# Patient Record
Sex: FEMALE | Race: WHITE | NOT HISPANIC OR LATINO | Employment: OTHER | URBAN - METROPOLITAN AREA
[De-identification: names, ages, dates, MRNs, and addresses within clinical notes are randomized per-mention and may not be internally consistent; named-entity substitution may affect disease eponyms.]

---

## 2018-06-14 ENCOUNTER — RECORDS - HEALTHEAST (OUTPATIENT)
Dept: LAB | Facility: CLINIC | Age: 73
End: 2018-06-14

## 2018-06-14 LAB
ALBUMIN SERPL-MCNC: 4.1 G/DL (ref 3.5–5)
ALP SERPL-CCNC: 82 U/L (ref 45–120)
ALT SERPL W P-5'-P-CCNC: 25 U/L (ref 0–45)
ANION GAP SERPL CALCULATED.3IONS-SCNC: 10 MMOL/L (ref 5–18)
AST SERPL W P-5'-P-CCNC: 19 U/L (ref 0–40)
BILIRUB SERPL-MCNC: 0.5 MG/DL (ref 0–1)
BUN SERPL-MCNC: 20 MG/DL (ref 8–28)
CALCIUM SERPL-MCNC: 9.6 MG/DL (ref 8.5–10.5)
CHLORIDE BLD-SCNC: 101 MMOL/L (ref 98–107)
CHOLEST SERPL-MCNC: 182 MG/DL
CO2 SERPL-SCNC: 29 MMOL/L (ref 22–31)
CREAT SERPL-MCNC: 0.83 MG/DL (ref 0.6–1.1)
FASTING STATUS PATIENT QL REPORTED: NO
GFR SERPL CREATININE-BSD FRML MDRD: >60 ML/MIN/1.73M2
GLUCOSE BLD-MCNC: 91 MG/DL (ref 70–125)
HDLC SERPL-MCNC: 65 MG/DL
LDLC SERPL CALC-MCNC: 100 MG/DL
POTASSIUM BLD-SCNC: 4 MMOL/L (ref 3.5–5)
PROT SERPL-MCNC: 6.5 G/DL (ref 6–8)
SODIUM SERPL-SCNC: 140 MMOL/L (ref 136–145)
TRIGL SERPL-MCNC: 83 MG/DL

## 2018-06-15 LAB — BACTERIA SPEC CULT: NO GROWTH

## 2019-05-02 ENCOUNTER — RECORDS - HEALTHEAST (OUTPATIENT)
Dept: LAB | Facility: CLINIC | Age: 74
End: 2019-05-02

## 2019-05-02 LAB
ALBUMIN SERPL-MCNC: 3.9 G/DL (ref 3.5–5)
ALP SERPL-CCNC: 88 U/L (ref 45–120)
ALT SERPL W P-5'-P-CCNC: 26 U/L (ref 0–45)
ANION GAP SERPL CALCULATED.3IONS-SCNC: 12 MMOL/L (ref 5–18)
AST SERPL W P-5'-P-CCNC: 22 U/L (ref 0–40)
BILIRUB SERPL-MCNC: 0.4 MG/DL (ref 0–1)
BUN SERPL-MCNC: 25 MG/DL (ref 8–28)
CALCIUM SERPL-MCNC: 9.6 MG/DL (ref 8.5–10.5)
CHLORIDE BLD-SCNC: 105 MMOL/L (ref 98–107)
CHOLEST SERPL-MCNC: 167 MG/DL
CO2 SERPL-SCNC: 24 MMOL/L (ref 22–31)
CREAT SERPL-MCNC: 0.77 MG/DL (ref 0.6–1.1)
FASTING STATUS PATIENT QL REPORTED: NORMAL
GFR SERPL CREATININE-BSD FRML MDRD: >60 ML/MIN/1.73M2
GLUCOSE BLD-MCNC: 88 MG/DL (ref 70–125)
HDLC SERPL-MCNC: 70 MG/DL
LDLC SERPL CALC-MCNC: 77 MG/DL
POTASSIUM BLD-SCNC: 4 MMOL/L (ref 3.5–5)
PROT SERPL-MCNC: 6.6 G/DL (ref 6–8)
SODIUM SERPL-SCNC: 141 MMOL/L (ref 136–145)
TRIGL SERPL-MCNC: 99 MG/DL

## 2020-09-21 ENCOUNTER — RECORDS - HEALTHEAST (OUTPATIENT)
Dept: LAB | Facility: CLINIC | Age: 75
End: 2020-09-21

## 2020-09-22 ENCOUNTER — RECORDS - HEALTHEAST (OUTPATIENT)
Dept: ADMINISTRATIVE | Facility: OTHER | Age: 75
End: 2020-09-22

## 2020-09-22 ENCOUNTER — RECORDS - HEALTHEAST (OUTPATIENT)
Dept: SCHEDULING | Facility: CLINIC | Age: 75
End: 2020-09-22

## 2020-09-22 DIAGNOSIS — Z78.0 POST-MENOPAUSAL: ICD-10-CM

## 2020-09-22 DIAGNOSIS — Z12.31 VISIT FOR SCREENING MAMMOGRAM: ICD-10-CM

## 2020-09-22 DIAGNOSIS — Z13.820 SCREENING FOR OSTEOPOROSIS: ICD-10-CM

## 2020-09-26 LAB
ALBUMIN SERPL-MCNC: 3.9 G/DL (ref 3.5–5)
ALP SERPL-CCNC: 83 U/L (ref 45–120)
ALT SERPL W P-5'-P-CCNC: 22 U/L (ref 0–45)
ANION GAP SERPL CALCULATED.3IONS-SCNC: 8 MMOL/L (ref 5–18)
AST SERPL W P-5'-P-CCNC: 16 U/L (ref 0–40)
BILIRUB SERPL-MCNC: 0.4 MG/DL (ref 0–1)
BUN SERPL-MCNC: 24 MG/DL (ref 8–28)
CALCIUM SERPL-MCNC: 9.4 MG/DL (ref 8.5–10.5)
CHLORIDE BLD-SCNC: 103 MMOL/L (ref 98–107)
CHOLEST SERPL-MCNC: 178 MG/DL
CO2 SERPL-SCNC: 30 MMOL/L (ref 22–31)
CREAT SERPL-MCNC: 0.79 MG/DL (ref 0.6–1.1)
FASTING STATUS PATIENT QL REPORTED: NORMAL
GFR SERPL CREATININE-BSD FRML MDRD: >60 ML/MIN/1.73M2
GLUCOSE BLD-MCNC: 92 MG/DL (ref 70–125)
HDLC SERPL-MCNC: 71 MG/DL
LDLC SERPL CALC-MCNC: 89 MG/DL
POTASSIUM BLD-SCNC: 3.9 MMOL/L (ref 3.5–5)
PROT SERPL-MCNC: 7.2 G/DL (ref 6–8)
SODIUM SERPL-SCNC: 141 MMOL/L (ref 136–145)
TRIGL SERPL-MCNC: 91 MG/DL

## 2021-03-10 ENCOUNTER — TRANSCRIBE ORDERS (OUTPATIENT)
Dept: OTHER | Age: 76
End: 2021-03-10

## 2021-03-10 ENCOUNTER — AMBULATORY - HEALTHEAST (OUTPATIENT)
Dept: ADMINISTRATIVE | Facility: REHABILITATION | Age: 76
End: 2021-03-10

## 2021-03-10 DIAGNOSIS — I89.0 LYMPHEDEMA: ICD-10-CM

## 2021-03-10 DIAGNOSIS — I89.0 LYMPHEDEMA: Primary | ICD-10-CM

## 2021-03-15 ENCOUNTER — AMBULATORY - HEALTHEAST (OUTPATIENT)
Dept: NURSING | Facility: CLINIC | Age: 76
End: 2021-03-15

## 2021-04-05 ENCOUNTER — AMBULATORY - HEALTHEAST (OUTPATIENT)
Dept: NURSING | Facility: CLINIC | Age: 76
End: 2021-04-05

## 2021-04-14 ENCOUNTER — AMBULATORY - HEALTHEAST (OUTPATIENT)
Dept: VASCULAR SURGERY | Facility: CLINIC | Age: 76
End: 2021-04-14

## 2021-04-14 DIAGNOSIS — I89.0 LYMPHEDEMA: ICD-10-CM

## 2021-04-14 DIAGNOSIS — L97.909 VENOUS STASIS ULCER (H): ICD-10-CM

## 2021-04-14 DIAGNOSIS — I83.009 VENOUS STASIS ULCER (H): ICD-10-CM

## 2021-04-21 ENCOUNTER — OFFICE VISIT - HEALTHEAST (OUTPATIENT)
Dept: PHYSICAL THERAPY | Facility: REHABILITATION | Age: 76
End: 2021-04-21

## 2021-04-21 DIAGNOSIS — L97.811 VENOUS STASIS ULCER OF OTHER PART OF RIGHT LOWER LEG LIMITED TO BREAKDOWN OF SKIN WITH VARICOSE VEINS (H): ICD-10-CM

## 2021-04-21 DIAGNOSIS — I83.018 VENOUS STASIS ULCER OF OTHER PART OF RIGHT LOWER LEG LIMITED TO BREAKDOWN OF SKIN WITH VARICOSE VEINS (H): ICD-10-CM

## 2021-04-21 DIAGNOSIS — R22.43 LOCALIZED SWELLING OF BOTH LOWER LEGS: ICD-10-CM

## 2021-04-21 DIAGNOSIS — I89.0 LYMPHEDEMA: ICD-10-CM

## 2021-04-28 ENCOUNTER — OFFICE VISIT - HEALTHEAST (OUTPATIENT)
Dept: VASCULAR SURGERY | Facility: CLINIC | Age: 76
End: 2021-04-28

## 2021-04-28 ENCOUNTER — AMBULATORY - HEALTHEAST (OUTPATIENT)
Dept: VASCULAR SURGERY | Facility: CLINIC | Age: 76
End: 2021-04-28

## 2021-04-28 ENCOUNTER — COMMUNICATION - HEALTHEAST (OUTPATIENT)
Dept: SCHEDULING | Facility: CLINIC | Age: 76
End: 2021-04-28

## 2021-04-28 DIAGNOSIS — L97.911 SKIN ULCER OF RIGHT LOWER LEG, LIMITED TO BREAKDOWN OF SKIN (H): ICD-10-CM

## 2021-04-28 DIAGNOSIS — R60.9 LIPEDEMA: ICD-10-CM

## 2021-04-28 DIAGNOSIS — I89.0 ACQUIRED LYMPHEDEMA OF LEG: ICD-10-CM

## 2021-04-28 DIAGNOSIS — L97.121: ICD-10-CM

## 2021-04-28 DIAGNOSIS — E55.9 VITAMIN D DEFICIENCY: ICD-10-CM

## 2021-04-28 DIAGNOSIS — I87.2 VENOUS INSUFFICIENCY OF BOTH LOWER EXTREMITIES: ICD-10-CM

## 2021-04-28 RX ORDER — SPIRONOLACTONE 25 MG/1
25 TABLET ORAL EVERY MORNING
Status: SHIPPED | COMMUNITY
Start: 2021-04-28

## 2021-04-28 ASSESSMENT — MIFFLIN-ST. JEOR: SCORE: 1585.44

## 2021-04-30 ENCOUNTER — OFFICE VISIT - HEALTHEAST (OUTPATIENT)
Dept: VASCULAR SURGERY | Facility: CLINIC | Age: 76
End: 2021-04-30

## 2021-04-30 ENCOUNTER — COMMUNICATION - HEALTHEAST (OUTPATIENT)
Dept: SCHEDULING | Facility: CLINIC | Age: 76
End: 2021-04-30

## 2021-04-30 DIAGNOSIS — T14.8XXA PAIN ASSOCIATED WITH WOUND: ICD-10-CM

## 2021-04-30 DIAGNOSIS — R52 PAIN ASSOCIATED WITH WOUND: ICD-10-CM

## 2021-04-30 DIAGNOSIS — L97.911 SKIN ULCER OF RIGHT LOWER LEG, LIMITED TO BREAKDOWN OF SKIN (H): ICD-10-CM

## 2021-04-30 DIAGNOSIS — I87.2 VENOUS INSUFFICIENCY OF BOTH LOWER EXTREMITIES: ICD-10-CM

## 2021-04-30 DIAGNOSIS — I89.0 ACQUIRED LYMPHEDEMA OF LEG: ICD-10-CM

## 2021-04-30 DIAGNOSIS — L03.818 CELLULITIS OF OTHER SPECIFIED SITE: ICD-10-CM

## 2021-05-02 LAB
BACTERIA SPEC CULT: ABNORMAL
BACTERIA SPEC CULT: ABNORMAL
GRAM STAIN RESULT: ABNORMAL
GRAM STAIN RESULT: ABNORMAL

## 2021-05-05 ENCOUNTER — OFFICE VISIT - HEALTHEAST (OUTPATIENT)
Dept: FAMILY MEDICINE | Facility: CLINIC | Age: 76
End: 2021-05-05

## 2021-05-05 ENCOUNTER — COMMUNICATION - HEALTHEAST (OUTPATIENT)
Dept: PHYSICAL THERAPY | Facility: REHABILITATION | Age: 76
End: 2021-05-05

## 2021-05-05 ENCOUNTER — OFFICE VISIT - HEALTHEAST (OUTPATIENT)
Dept: VASCULAR SURGERY | Facility: CLINIC | Age: 76
End: 2021-05-05

## 2021-05-05 ENCOUNTER — COMMUNICATION - HEALTHEAST (OUTPATIENT)
Dept: VASCULAR SURGERY | Facility: CLINIC | Age: 76
End: 2021-05-05

## 2021-05-05 ENCOUNTER — OFFICE VISIT - HEALTHEAST (OUTPATIENT)
Dept: PHYSICAL THERAPY | Facility: REHABILITATION | Age: 76
End: 2021-05-05

## 2021-05-05 DIAGNOSIS — L97.911 SKIN ULCER OF RIGHT LOWER LEG, LIMITED TO BREAKDOWN OF SKIN (H): ICD-10-CM

## 2021-05-05 DIAGNOSIS — I89.0 ACQUIRED LYMPHEDEMA OF LEG: ICD-10-CM

## 2021-05-05 DIAGNOSIS — I87.2 VENOUS INSUFFICIENCY OF BOTH LOWER EXTREMITIES: ICD-10-CM

## 2021-05-05 DIAGNOSIS — R22.43 LOCALIZED SWELLING OF BOTH LOWER LEGS: ICD-10-CM

## 2021-05-05 DIAGNOSIS — I89.0 LYMPHEDEMA: ICD-10-CM

## 2021-05-05 DIAGNOSIS — L08.9 SOFT TISSUE INFECTION: ICD-10-CM

## 2021-05-05 DIAGNOSIS — I83.018 VENOUS STASIS ULCER OF OTHER PART OF RIGHT LOWER LEG LIMITED TO BREAKDOWN OF SKIN WITH VARICOSE VEINS (H): ICD-10-CM

## 2021-05-05 DIAGNOSIS — L97.811 VENOUS STASIS ULCER OF OTHER PART OF RIGHT LOWER LEG LIMITED TO BREAKDOWN OF SKIN WITH VARICOSE VEINS (H): ICD-10-CM

## 2021-05-07 ENCOUNTER — AMBULATORY - HEALTHEAST (OUTPATIENT)
Dept: VASCULAR SURGERY | Facility: CLINIC | Age: 76
End: 2021-05-07

## 2021-05-10 ENCOUNTER — OFFICE VISIT - HEALTHEAST (OUTPATIENT)
Dept: PHYSICAL THERAPY | Facility: REHABILITATION | Age: 76
End: 2021-05-10

## 2021-05-10 ENCOUNTER — AMBULATORY - HEALTHEAST (OUTPATIENT)
Dept: VASCULAR SURGERY | Facility: CLINIC | Age: 76
End: 2021-05-10

## 2021-05-10 DIAGNOSIS — L97.811 VENOUS STASIS ULCER OF OTHER PART OF RIGHT LOWER LEG LIMITED TO BREAKDOWN OF SKIN WITH VARICOSE VEINS (H): ICD-10-CM

## 2021-05-10 DIAGNOSIS — I89.0 LYMPHEDEMA: ICD-10-CM

## 2021-05-10 DIAGNOSIS — I83.018 VENOUS STASIS ULCER OF OTHER PART OF RIGHT LOWER LEG LIMITED TO BREAKDOWN OF SKIN WITH VARICOSE VEINS (H): ICD-10-CM

## 2021-05-10 DIAGNOSIS — R22.43 LOCALIZED SWELLING OF BOTH LOWER LEGS: ICD-10-CM

## 2021-05-10 DIAGNOSIS — L97.911 SKIN ULCER OF RIGHT LOWER LEG, LIMITED TO BREAKDOWN OF SKIN (H): ICD-10-CM

## 2021-05-13 ENCOUNTER — OFFICE VISIT - HEALTHEAST (OUTPATIENT)
Dept: VASCULAR SURGERY | Facility: CLINIC | Age: 76
End: 2021-05-13

## 2021-05-13 DIAGNOSIS — L97.911 SKIN ULCER OF RIGHT LOWER LEG, LIMITED TO BREAKDOWN OF SKIN (H): ICD-10-CM

## 2021-05-13 DIAGNOSIS — R60.9 LIPEDEMA: ICD-10-CM

## 2021-05-13 DIAGNOSIS — I89.0 ACQUIRED LYMPHEDEMA OF LEG: ICD-10-CM

## 2021-05-17 ENCOUNTER — AMBULATORY - HEALTHEAST (OUTPATIENT)
Dept: VASCULAR SURGERY | Facility: CLINIC | Age: 76
End: 2021-05-17

## 2021-05-17 ENCOUNTER — OFFICE VISIT - HEALTHEAST (OUTPATIENT)
Dept: PHYSICAL THERAPY | Facility: REHABILITATION | Age: 76
End: 2021-05-17

## 2021-05-17 DIAGNOSIS — L97.811 VENOUS STASIS ULCER OF OTHER PART OF RIGHT LOWER LEG LIMITED TO BREAKDOWN OF SKIN WITH VARICOSE VEINS (H): ICD-10-CM

## 2021-05-17 DIAGNOSIS — I83.018 VENOUS STASIS ULCER OF OTHER PART OF RIGHT LOWER LEG LIMITED TO BREAKDOWN OF SKIN WITH VARICOSE VEINS (H): ICD-10-CM

## 2021-05-17 DIAGNOSIS — R22.43 LOCALIZED SWELLING OF BOTH LOWER LEGS: ICD-10-CM

## 2021-05-17 DIAGNOSIS — I89.0 LYMPHEDEMA: ICD-10-CM

## 2021-05-17 DIAGNOSIS — L97.911 SKIN ULCER OF RIGHT LOWER LEG, LIMITED TO BREAKDOWN OF SKIN (H): ICD-10-CM

## 2021-05-19 ENCOUNTER — AMBULATORY - HEALTHEAST (OUTPATIENT)
Dept: VASCULAR SURGERY | Facility: CLINIC | Age: 76
End: 2021-05-19

## 2021-05-19 ENCOUNTER — COMMUNICATION - HEALTHEAST (OUTPATIENT)
Dept: VASCULAR SURGERY | Facility: CLINIC | Age: 76
End: 2021-05-19

## 2021-05-19 ENCOUNTER — OFFICE VISIT - HEALTHEAST (OUTPATIENT)
Dept: PHYSICAL THERAPY | Facility: REHABILITATION | Age: 76
End: 2021-05-19

## 2021-05-19 DIAGNOSIS — L97.911 SKIN ULCER OF RIGHT LOWER LEG, LIMITED TO BREAKDOWN OF SKIN (H): ICD-10-CM

## 2021-05-19 DIAGNOSIS — R22.43 LOCALIZED SWELLING OF BOTH LOWER LEGS: ICD-10-CM

## 2021-05-19 DIAGNOSIS — L08.9 SOFT TISSUE INFECTION: ICD-10-CM

## 2021-05-19 DIAGNOSIS — I89.0 LYMPHEDEMA: ICD-10-CM

## 2021-05-19 DIAGNOSIS — I89.0 ACQUIRED LYMPHEDEMA OF LEG: ICD-10-CM

## 2021-05-19 DIAGNOSIS — L97.811 VENOUS STASIS ULCER OF OTHER PART OF RIGHT LOWER LEG LIMITED TO BREAKDOWN OF SKIN WITH VARICOSE VEINS (H): ICD-10-CM

## 2021-05-19 DIAGNOSIS — I83.018 VENOUS STASIS ULCER OF OTHER PART OF RIGHT LOWER LEG LIMITED TO BREAKDOWN OF SKIN WITH VARICOSE VEINS (H): ICD-10-CM

## 2021-05-25 ENCOUNTER — OFFICE VISIT - HEALTHEAST (OUTPATIENT)
Dept: PHYSICAL THERAPY | Facility: REHABILITATION | Age: 76
End: 2021-05-25

## 2021-05-25 ENCOUNTER — AMBULATORY - HEALTHEAST (OUTPATIENT)
Dept: VASCULAR SURGERY | Facility: CLINIC | Age: 76
End: 2021-05-25

## 2021-05-25 DIAGNOSIS — I87.2 VENOUS INSUFFICIENCY OF BOTH LOWER EXTREMITIES: ICD-10-CM

## 2021-05-25 DIAGNOSIS — I83.018 VENOUS STASIS ULCER OF OTHER PART OF RIGHT LOWER LEG LIMITED TO BREAKDOWN OF SKIN WITH VARICOSE VEINS (H): ICD-10-CM

## 2021-05-25 DIAGNOSIS — L08.9 SOFT TISSUE INFECTION: ICD-10-CM

## 2021-05-25 DIAGNOSIS — I89.0 LYMPHEDEMA: ICD-10-CM

## 2021-05-25 DIAGNOSIS — L97.911 SKIN ULCER OF RIGHT LOWER LEG, LIMITED TO BREAKDOWN OF SKIN (H): ICD-10-CM

## 2021-05-25 DIAGNOSIS — I89.0 ACQUIRED LYMPHEDEMA OF LEG: ICD-10-CM

## 2021-05-25 DIAGNOSIS — L97.811 VENOUS STASIS ULCER OF OTHER PART OF RIGHT LOWER LEG LIMITED TO BREAKDOWN OF SKIN WITH VARICOSE VEINS (H): ICD-10-CM

## 2021-05-25 DIAGNOSIS — R22.43 LOCALIZED SWELLING OF BOTH LOWER LEGS: ICD-10-CM

## 2021-05-26 ENCOUNTER — RECORDS - HEALTHEAST (OUTPATIENT)
Dept: ADMINISTRATIVE | Facility: CLINIC | Age: 76
End: 2021-05-26

## 2021-05-27 ENCOUNTER — RECORDS - HEALTHEAST (OUTPATIENT)
Dept: ADMINISTRATIVE | Facility: CLINIC | Age: 76
End: 2021-05-27

## 2021-05-27 ENCOUNTER — OFFICE VISIT - HEALTHEAST (OUTPATIENT)
Dept: VASCULAR SURGERY | Facility: CLINIC | Age: 76
End: 2021-05-27

## 2021-05-27 VITALS
RESPIRATION RATE: 18 BRPM | TEMPERATURE: 97.8 F | HEART RATE: 78 BPM | DIASTOLIC BLOOD PRESSURE: 78 MMHG | SYSTOLIC BLOOD PRESSURE: 128 MMHG

## 2021-05-27 VITALS
TEMPERATURE: 98.6 F | OXYGEN SATURATION: 99 % | RESPIRATION RATE: 19 BRPM | HEART RATE: 81 BPM | DIASTOLIC BLOOD PRESSURE: 79 MMHG | SYSTOLIC BLOOD PRESSURE: 135 MMHG

## 2021-05-27 VITALS
RESPIRATION RATE: 18 BRPM | TEMPERATURE: 97.8 F | DIASTOLIC BLOOD PRESSURE: 78 MMHG | HEART RATE: 80 BPM | SYSTOLIC BLOOD PRESSURE: 124 MMHG

## 2021-05-27 VITALS — SYSTOLIC BLOOD PRESSURE: 148 MMHG | DIASTOLIC BLOOD PRESSURE: 76 MMHG | HEART RATE: 84 BPM | TEMPERATURE: 98.9 F

## 2021-05-27 VITALS
DIASTOLIC BLOOD PRESSURE: 64 MMHG | SYSTOLIC BLOOD PRESSURE: 112 MMHG | HEART RATE: 81 BPM | TEMPERATURE: 98.3 F | OXYGEN SATURATION: 99 %

## 2021-05-27 VITALS
DIASTOLIC BLOOD PRESSURE: 70 MMHG | RESPIRATION RATE: 17 BRPM | TEMPERATURE: 97.7 F | SYSTOLIC BLOOD PRESSURE: 128 MMHG | HEART RATE: 78 BPM

## 2021-05-27 VITALS — TEMPERATURE: 97.3 F | DIASTOLIC BLOOD PRESSURE: 66 MMHG | RESPIRATION RATE: 18 BRPM | SYSTOLIC BLOOD PRESSURE: 130 MMHG

## 2021-05-27 VITALS
SYSTOLIC BLOOD PRESSURE: 120 MMHG | DIASTOLIC BLOOD PRESSURE: 70 MMHG | HEART RATE: 78 BPM | RESPIRATION RATE: 18 BRPM | TEMPERATURE: 98 F

## 2021-05-27 DIAGNOSIS — I87.2 VENOUS INSUFFICIENCY OF BOTH LOWER EXTREMITIES: ICD-10-CM

## 2021-05-27 DIAGNOSIS — I89.0 LYMPHEDEMA OF BOTH LOWER EXTREMITIES: ICD-10-CM

## 2021-05-27 DIAGNOSIS — R60.9 LIPEDEMA: ICD-10-CM

## 2021-05-27 DIAGNOSIS — E66.01 MORBID OBESITY WITH BMI OF 50.0-59.9, ADULT (H): ICD-10-CM

## 2021-05-27 DIAGNOSIS — L97.911 SKIN ULCER OF RIGHT LOWER LEG, LIMITED TO BREAKDOWN OF SKIN (H): ICD-10-CM

## 2021-05-27 DIAGNOSIS — I89.0 ACQUIRED LYMPHEDEMA OF LEG: ICD-10-CM

## 2021-06-01 ENCOUNTER — AMBULATORY - HEALTHEAST (OUTPATIENT)
Dept: VASCULAR SURGERY | Facility: CLINIC | Age: 76
End: 2021-06-01

## 2021-06-01 ENCOUNTER — RECORDS - HEALTHEAST (OUTPATIENT)
Dept: ADMINISTRATIVE | Facility: CLINIC | Age: 76
End: 2021-06-01

## 2021-06-01 DIAGNOSIS — I87.2 VENOUS INSUFFICIENCY OF BOTH LOWER EXTREMITIES: ICD-10-CM

## 2021-06-01 DIAGNOSIS — I89.0 ACQUIRED LYMPHEDEMA OF LEG: ICD-10-CM

## 2021-06-01 DIAGNOSIS — L97.911 SKIN ULCER OF RIGHT LOWER LEG, LIMITED TO BREAKDOWN OF SKIN (H): ICD-10-CM

## 2021-06-02 ENCOUNTER — OFFICE VISIT - HEALTHEAST (OUTPATIENT)
Dept: PHYSICAL THERAPY | Facility: REHABILITATION | Age: 76
End: 2021-06-02

## 2021-06-02 DIAGNOSIS — I89.0 LYMPHEDEMA: ICD-10-CM

## 2021-06-02 DIAGNOSIS — L97.811 VENOUS STASIS ULCER OF OTHER PART OF RIGHT LOWER LEG LIMITED TO BREAKDOWN OF SKIN WITH VARICOSE VEINS (H): ICD-10-CM

## 2021-06-02 DIAGNOSIS — R22.43 LOCALIZED SWELLING OF BOTH LOWER LEGS: ICD-10-CM

## 2021-06-02 DIAGNOSIS — I83.018 VENOUS STASIS ULCER OF OTHER PART OF RIGHT LOWER LEG LIMITED TO BREAKDOWN OF SKIN WITH VARICOSE VEINS (H): ICD-10-CM

## 2021-06-04 ENCOUNTER — AMBULATORY - HEALTHEAST (OUTPATIENT)
Dept: VASCULAR SURGERY | Facility: CLINIC | Age: 76
End: 2021-06-04

## 2021-06-04 ENCOUNTER — OFFICE VISIT - HEALTHEAST (OUTPATIENT)
Dept: PHYSICAL THERAPY | Facility: REHABILITATION | Age: 76
End: 2021-06-04

## 2021-06-04 DIAGNOSIS — I83.018 VENOUS STASIS ULCER OF OTHER PART OF RIGHT LOWER LEG LIMITED TO BREAKDOWN OF SKIN WITH VARICOSE VEINS (H): ICD-10-CM

## 2021-06-04 DIAGNOSIS — R22.43 LOCALIZED SWELLING OF BOTH LOWER LEGS: ICD-10-CM

## 2021-06-04 DIAGNOSIS — L97.811 VENOUS STASIS ULCER OF OTHER PART OF RIGHT LOWER LEG LIMITED TO BREAKDOWN OF SKIN WITH VARICOSE VEINS (H): ICD-10-CM

## 2021-06-04 DIAGNOSIS — I89.0 LYMPHEDEMA: ICD-10-CM

## 2021-06-05 VITALS
SYSTOLIC BLOOD PRESSURE: 122 MMHG | RESPIRATION RATE: 18 BRPM | BODY MASS INDEX: 47.58 KG/M2 | TEMPERATURE: 98.2 F | HEART RATE: 78 BPM | DIASTOLIC BLOOD PRESSURE: 60 MMHG | WEIGHT: 252 LBS | HEIGHT: 61 IN

## 2021-06-07 ENCOUNTER — AMBULATORY - HEALTHEAST (OUTPATIENT)
Dept: VASCULAR SURGERY | Facility: CLINIC | Age: 76
End: 2021-06-07

## 2021-06-07 ENCOUNTER — OFFICE VISIT - HEALTHEAST (OUTPATIENT)
Dept: PHYSICAL THERAPY | Facility: REHABILITATION | Age: 76
End: 2021-06-07

## 2021-06-07 DIAGNOSIS — I89.0 LYMPHEDEMA: ICD-10-CM

## 2021-06-07 DIAGNOSIS — L97.911 SKIN ULCER OF RIGHT LOWER LEG, LIMITED TO BREAKDOWN OF SKIN (H): ICD-10-CM

## 2021-06-07 DIAGNOSIS — R22.43 LOCALIZED SWELLING OF BOTH LOWER LEGS: ICD-10-CM

## 2021-06-07 DIAGNOSIS — I83.018 VENOUS STASIS ULCER OF OTHER PART OF RIGHT LOWER LEG LIMITED TO BREAKDOWN OF SKIN WITH VARICOSE VEINS (H): ICD-10-CM

## 2021-06-07 DIAGNOSIS — L97.811 VENOUS STASIS ULCER OF OTHER PART OF RIGHT LOWER LEG LIMITED TO BREAKDOWN OF SKIN WITH VARICOSE VEINS (H): ICD-10-CM

## 2021-06-07 DIAGNOSIS — I89.0 ACQUIRED LYMPHEDEMA OF LEG: ICD-10-CM

## 2021-06-09 ENCOUNTER — COMMUNICATION - HEALTHEAST (OUTPATIENT)
Dept: SCHEDULING | Facility: CLINIC | Age: 76
End: 2021-06-09

## 2021-06-09 ENCOUNTER — OFFICE VISIT - HEALTHEAST (OUTPATIENT)
Dept: PHYSICAL THERAPY | Facility: REHABILITATION | Age: 76
End: 2021-06-09

## 2021-06-09 DIAGNOSIS — R22.43 LOCALIZED SWELLING OF BOTH LOWER LEGS: ICD-10-CM

## 2021-06-09 DIAGNOSIS — I89.0 LYMPHEDEMA: ICD-10-CM

## 2021-06-09 DIAGNOSIS — L97.811 VENOUS STASIS ULCER OF OTHER PART OF RIGHT LOWER LEG LIMITED TO BREAKDOWN OF SKIN WITH VARICOSE VEINS (H): ICD-10-CM

## 2021-06-09 DIAGNOSIS — I83.018 VENOUS STASIS ULCER OF OTHER PART OF RIGHT LOWER LEG LIMITED TO BREAKDOWN OF SKIN WITH VARICOSE VEINS (H): ICD-10-CM

## 2021-06-10 ENCOUNTER — OFFICE VISIT - HEALTHEAST (OUTPATIENT)
Dept: VASCULAR SURGERY | Facility: CLINIC | Age: 76
End: 2021-06-10

## 2021-06-10 DIAGNOSIS — I87.2 VENOUS INSUFFICIENCY OF BOTH LOWER EXTREMITIES: ICD-10-CM

## 2021-06-10 DIAGNOSIS — L97.911 SKIN ULCER OF RIGHT LOWER LEG, LIMITED TO BREAKDOWN OF SKIN (H): ICD-10-CM

## 2021-06-10 DIAGNOSIS — E66.01 MORBID OBESITY WITH BMI OF 50.0-59.9, ADULT (H): ICD-10-CM

## 2021-06-10 DIAGNOSIS — I89.0 ACQUIRED LYMPHEDEMA OF LEG: ICD-10-CM

## 2021-06-14 ENCOUNTER — AMBULATORY - HEALTHEAST (OUTPATIENT)
Dept: VASCULAR SURGERY | Facility: CLINIC | Age: 76
End: 2021-06-14

## 2021-06-14 ENCOUNTER — OFFICE VISIT - HEALTHEAST (OUTPATIENT)
Dept: PHYSICAL THERAPY | Facility: REHABILITATION | Age: 76
End: 2021-06-14

## 2021-06-14 DIAGNOSIS — L97.811 VENOUS STASIS ULCER OF OTHER PART OF RIGHT LOWER LEG LIMITED TO BREAKDOWN OF SKIN WITH VARICOSE VEINS (H): ICD-10-CM

## 2021-06-14 DIAGNOSIS — R22.43 LOCALIZED SWELLING OF BOTH LOWER LEGS: ICD-10-CM

## 2021-06-14 DIAGNOSIS — I83.018 VENOUS STASIS ULCER OF OTHER PART OF RIGHT LOWER LEG LIMITED TO BREAKDOWN OF SKIN WITH VARICOSE VEINS (H): ICD-10-CM

## 2021-06-14 DIAGNOSIS — I89.0 LYMPHEDEMA: ICD-10-CM

## 2021-06-14 DIAGNOSIS — L97.911 SKIN ULCER OF RIGHT LOWER LEG, LIMITED TO BREAKDOWN OF SKIN (H): ICD-10-CM

## 2021-06-15 ENCOUNTER — RECORDS - HEALTHEAST (OUTPATIENT)
Dept: ADMINISTRATIVE | Facility: OTHER | Age: 76
End: 2021-06-15

## 2021-06-16 ENCOUNTER — OFFICE VISIT - HEALTHEAST (OUTPATIENT)
Dept: PHYSICAL THERAPY | Facility: REHABILITATION | Age: 76
End: 2021-06-16

## 2021-06-16 ENCOUNTER — AMBULATORY - HEALTHEAST (OUTPATIENT)
Dept: VASCULAR SURGERY | Facility: CLINIC | Age: 76
End: 2021-06-16

## 2021-06-16 DIAGNOSIS — I87.2 VENOUS INSUFFICIENCY OF BOTH LOWER EXTREMITIES: ICD-10-CM

## 2021-06-16 DIAGNOSIS — I83.018 VENOUS STASIS ULCER OF OTHER PART OF RIGHT LOWER LEG LIMITED TO BREAKDOWN OF SKIN WITH VARICOSE VEINS (H): ICD-10-CM

## 2021-06-16 DIAGNOSIS — R22.43 LOCALIZED SWELLING OF BOTH LOWER LEGS: ICD-10-CM

## 2021-06-16 DIAGNOSIS — L97.911 SKIN ULCER OF RIGHT LOWER LEG, LIMITED TO BREAKDOWN OF SKIN (H): ICD-10-CM

## 2021-06-16 DIAGNOSIS — I89.0 LYMPHEDEMA: ICD-10-CM

## 2021-06-16 DIAGNOSIS — L97.811 VENOUS STASIS ULCER OF OTHER PART OF RIGHT LOWER LEG LIMITED TO BREAKDOWN OF SKIN WITH VARICOSE VEINS (H): ICD-10-CM

## 2021-06-16 DIAGNOSIS — I89.0 ACQUIRED LYMPHEDEMA OF LEG: ICD-10-CM

## 2021-06-16 PROBLEM — E55.9 VITAMIN D DEFICIENCY: Status: ACTIVE | Noted: 2021-04-28

## 2021-06-16 PROBLEM — I10 ESSENTIAL HYPERTENSION: Status: ACTIVE | Noted: 2021-04-28

## 2021-06-16 PROBLEM — K21.9 GERD (GASTROESOPHAGEAL REFLUX DISEASE): Status: ACTIVE | Noted: 2021-04-28

## 2021-06-16 PROBLEM — F32.A DEPRESSION: Status: ACTIVE | Noted: 2021-04-28

## 2021-06-16 NOTE — PROGRESS NOTES
April 22, 2021    Patient: Elizabeth Kelley   MR Number: [id number]   YOB: 1945   Date of Visit: 4/21/2021       Dear Dr. Demond Walters:    Thank you for this referral.   We are seeing Elizabeth Kelley for Physical Therapy of R LE lymphedema with venous stasis ulcer.    Medicare and/or Medicaid requires physician review and approval of the treatment plan. Please review the plan of care and verify that you agree with the therapy plan of care by co-signing this note.      Plan of Care  Authorization / Certification Start Date: 04/21/21  Authorization / Certification End Date: 07/20/21  Authorization / Certification Number of Visits: 12  Communication with: Referral Source  Patient Related Instruction: Nature of Condition;Treatment plan and rationale;Self Care instruction;Basis of treatment;Precautions;Next steps;Expected outcome  Times per Week: 2-3  Number of Weeks: 4-6  Number of Visits: 12  Discharge Planning: when goals are met or pt I with home program  Precautions / Restrictions :   Therapeutic Exercise: ROM;Stretching;Strengthening;Lymphedema  Neuromuscular Reeducation: core;TNE  Manual Therapy: soft tissue mobilization;myofascial release;joint mobilization;lymphatic drainage massage  Functional Training (ADL's): skin care;self care;lymphedema precautions;bandage/garment wear and care;meal prep;safety procedures;instructions for equipment;ADL's;compensatory training;instructions in transfers;ergonomics  Equipment: compression bandages      Goals  Patient will have a decreased volume in : right;LE;by 5%;to decrease risk of infection;for better fit of clothing;for improved body image;for ease of movement;in 12 weeks  Patient will have decreased fibrosis, scar tissue for improved lymphatic mobilitiy : in 12 weeks  Patient will perform, verbalize self-management of: skin care;self-monitoring;exercise;massage;self-compression;compression wear;compression care;infection prevention;in 12 weeks  Pt will:  fred decreased wound size by 50% for improvement of skin quality and decreased risk for infections in 12 weeks.          If you have any questions or concerns, please don't hesitate to call.    Sincerely,    Bee Olguin, PT, DPT, OCS, CLT      Physician recommendation:     ___ Follow therapist's recommendation        ___ Modify therapy      I certify the need for these services furnished within this plan and while under my care.    Referring Provider's  Printed Name:   _____________________________________________    Referring Provider's signature:  __________________________________________________  Date/time:    ________________        After signing this form, please return to the fax number in the header.  Thank you.    [facility]   Initial Evaluation    Patient Name: Elizabeth Kelley  Date of evaluation: 4/22/2021(evaluation performed 4/21/21)  Visit number: 1/12  Referring Provider: Demond Walters MD  Referring Diagnosis:   Lymphedema [I89.0]  - Primary       Venous stasis ulcer (H) [I83.009, L97.909]         Visit Diagnosis:     ICD-10-CM    1. Lymphedema  I89.0    2. Venous stasis ulcer of other part of right lower leg limited to breakdown of skin with varicose veins (H)  I83.018     L97.811    3. Localized swelling of both lower legs  R22.43        Assessment:        Elizabeth Kelley is a 75 y.o. female who presents to therapy today with chief complaints of R LE wound and swelling. Onset date of sx was 10/20/21 when pt nicked herself a couple of times while shaving. Pt reports those wounds ended up worsening and she tried to use cephalexin and prednisone (which had worked for wounds she had had on her left leg years ago) and those meds helped some but then she couldn't take them anymore and wounds continued. Pt started with a wound clinic in February for wound care and she recently just had a venous closure procedure on 4/12/21 on her R leg to help heal her wounds.  Pt reported h/o  significant weight loss >25 years ago with B medial thigh and lower abdominal skin removal - with lymph nodes accidentally removed at that time.  Pt has struggled with B LE swelling ever since. Pain symptoms are mild to severe B LE.  Functional impairments include difficulty and pain in R leg with decreased skin integrity, ROM and trunk/core strength.  Pt demo's signs and sx consistent with R LE lymphedema and venous swelling with significant R lower leg and B thigh wounds and scarring with fibrosis.     Pt. is appropriate for skilled PT intervention as outlined in the Plan of Care (POC).  Pt. is a good candidate for skilled PT services to improve pain levels and function.  Pt. would be a good candidate for edema management and to develop a home management program.    Goals:  Patient will have a decreased volume in : right;LE;by 5%;to decrease risk of infection;for better fit of clothing;for improved body image;for ease of movement;in 12 weeks  Patient will have decreased fibrosis, scar tissue for improved lymphatic mobilitiy : in 12 weeks  Patient will perform, verbalize self-management of: skin care;self-monitoring;exercise;massage;self-compression;compression wear;compression care;infection prevention;in 12 weeks  Pt will: demo decreased wound size by 50% for improvement of skin quality and decreased risk for infections in 12 weeks.      Patient's expectations/goals are realistic.    Barriers to Learning or Achieving Goals:  Chronicity of the problem.  Co-morbidities or other medical factors.  venous wound long term       Plan / Patient Instructions:      Plan for next visit:  Pt having a consult with [facility] wound care clinic NP next week. Unsure if there may be still some infection in R leg contributing to her high levels of pain and sensitivity to touch on R LE.  Pt to discuss with NP possibility of change to wound dressings or wound cultures for improved treatment and healing rate of wound.  Pt to  continue with self-wound dressing changes as needed and compression bandaging over top until able to return to PT for POC.  Assess response to re-starting lymph HEP as she did 5 years ago - to try sitting, supine or standing as tolerated.    Plan of Care:   Authorization / Certification Start Date: 04/21/21  Authorization / Certification End Date: 07/20/21  Authorization / Certification Number of Visits: 12  Communication with: Referral Source  Patient Related Instruction: Nature of Condition;Treatment plan and rationale;Self Care instruction;Basis of treatment;Precautions;Next steps;Expected outcome  Times per Week: 2-3  Number of Weeks: 4-6  Number of Visits: 12  Discharge Planning: when goals are met or pt I with home program  Precautions / Restrictions :   Therapeutic Exercise: ROM;Stretching;Strengthening;Lymphedema  Neuromuscular Reeducation: core;TNE  Manual Therapy: soft tissue mobilization;myofascial release;joint mobilization;lymphatic drainage massage  Functional Training (ADL's): skin care;self care;lymphedema precautions;bandage/garment wear and care;meal prep;safety procedures;instructions for equipment;ADL's;compensatory training;instructions in transfers;ergonomics  Equipment: compression bandages      Treatment techniques, plan of care, and goals were discussed with the patient.  The patient agrees to the plan as outlined.  The plan of care is dynamic and will be modified on an ongoing basis.       Subjective:       Social information:   Occupation: at jewelry store   Work Status:Working full time    History of Present Illness:  Pt reports she has been struggling with R leg swelling and weeping since October 2020. Pt reports she had shaved her leg and got 3 small nicks at the top of her shin. The wounds then got bigger and pt tried to do cephalexin and prednisone and that helped some but then it wasn't helping and she couldn't take the meds anymore. Wounds continued to worsened so  pt started with a wound clinic around 2021. Pt just recently had a R leg surgery on 21 for venous closure procedure to help with the wound healing.      Pt reports h/o L leg wounds and did cephalexin and prednisone that healed.  Pt also reports having h/o skin removal B medial thighs after losing weight - around  - which she has significant keloid scars from.  Pt has struggled with B leg swelling for at least 25 years and has also been diagnosed with MS as well. Pt will use a cane when she has to walk long distances. Sx with MS - diminished sensation in fingers, legs will get tired and lethargic to move when flared up.    Pt reports her wound treatment is: washing her leg down with saline, pat it dry with paper towels. Pt has tried something with silver and she thought it made her break out. Pt had tried a medicated gauze but stopped using that because it was pulling skin when she would go to take it off.      Surgery: B thigh and low abdominal skin removal after significant weight loss >25 years ago  Treatments: past lymphedema treatment for L LE  Precautions/Restrictions: significant open venous wound R LE    Functional limitations are described as occurring with:   ascending and descending stairs or curbs  balance  bending  personal cares dressing lower body and donning shoes and socks  standing for ADLs and work  transitional movements getting in  bed, chair and car, getting out of  bed, chair and car, sit to stand and sit to supine  walking      Pain Ratin  Pain rating at best: 2  Pain rating at worst: 10  Pain description: aching and pain    Patient reports benefit from:  gauze and abd pads       Objective:        Precautions/Restrictions: Long term venous wound with sensitive skin to adhesives  Involved side: Right  Posture Observation:      General sitting posture is  fair.  Gait: Amb with WBOS with decreased hip, knee and ankle ROM with decreased push off, heel strike, leg swing, TKE in  stance phase    Palpation: Pt unable to tolerate any more than very light pressure surrounding R shin/calf from superior ankle to distal knee joint  Pt has significant fibrosis L LE    ROM: B LE mod to significantly limited due to soft tissue approximation    Strength: B hip flexors 4/5, B quad and DF 5/5    Right Lower Extremity Total Estimated Volume (cm3): 03997.78  Left Lower Extremity Total Estimated Volume (cm3): 8370.65  Lower Extremity Swelling Comparison (%): 35.94 %       Involved area: Right Leg  Edema: Pitting at grade, 1+, Moderate and Severe - was not able to apply more than light pressure due to leg pain    Induration: Yes  Fibrosis: severe  Temperature: Warm  Sensation: Hypersensitive  Skin color: Reddened  Skin texture: Dry, Lumpy and Hyperkeratosis    Scars: Significant keloid scarring B medial thigh and scar reported on pt's low abdomin for skin removal after weight loss >25 years ago - pt reports there were lymph nodes removed when pt had skin removed    Wounds: L mid thigh 5 cm x1 cm with sca, R medial thigh 2 spots 1.5 cm by 0.5 cm scabs, R lower limb has redness 22 cm tall and pt reports it surrounds the entire lower leg - covered with dressing today that was not removed due to not having dressing supplies with to recover - but redness, dryness and warmth observed at top and bottom of abd pads    Muscle tone: Normal      Treatment Today      TREATMENT MINUTES COMMENTS   Evaluation 30 R LE lymph   Self-care/ Home management 14 Initiated bandaging program - pt reported she was already doing her own wound dressing changes 1-2 times per day depending on drainage. Encouraged her to continue changing wound dressings once saturated and to keep her tetragrip or compression bandages that she has been using on over top of wound dressings as much as possible.   Manual therapy 2 Initiated MLD with self-massage instructions on lymph home program.   Neuromuscular Re-education     Therapeutic Activity      Therapeutic Exercises 12 Initiated and reviewed lymph HEP per pt instructions as pt has performed them in the past. Reviewed and educated on potential to do LE exercises supine or standing if able. Printed AVS for home.   Gait training     Modality__________________                Total 28    Blank areas are intentional and mean the treatment did not include these items.        PT Evaluation Code: (Please list factors)  Patient History/Comorbidities: see subj  Examination: R LE  Clinical Presentation: stable  Clinical Decision Making: low    Patient History/  Comorbidities Examination  (body structures and functions, activity limitations, and/or participation restrictions) Clinical Presentation Clinical Decision Making (Complexity)   No documented Comorbidities or personal factors 1-2 Elements Stable and/or uncomplicated Low   1-2 documented comorbidities or personal factor 3 Elements Evolving clinical presentation with changing characteristics Moderate   3-4 documented comorbidities or personal factors 4 or more Unstable and unpredictable High       Bee Olguin PT, DPT, OCS, CLT  4/22/2021  9:09 AM

## 2021-06-17 NOTE — PROGRESS NOTES
"[facility] Daily Progress     Patient Name: Elizabeth Kelley  Date: 5/5/2021  Visit #: 2  PTA visit #:  1  Referral Diagnosis: Lymphedema  Referring provider: No ref. provider found  Visit Diagnosis:     ICD-10-CM    1. Lymphedema  I89.0    2. Venous stasis ulcer of other part of right lower leg limited to breakdown of skin with varicose veins (H)  I83.018     L97.811    3. Localized swelling of both lower legs  R22.43          Assessment:   Pt returns today for her first follow up appointment.  Pt with wound bandages on from vascular appointment this morning..  Pt reporting a lot of pain after debridement of the wound. R LE very painful to touch.  Patient is benefitting from skilled physical therapy and is making steady progress toward functional goals.  Patient is appropriate to continue with skilled physical therapy intervention, as indicated by initial plan of care.    Goal Status:  No data recorded  Patient will have a decreased volume in : right;LE;by 5%;to decrease risk of infection;for better fit of clothing;for improved body image;for ease of movement;in 12 weeks  Patient will have decreased fibrosis, scar tissue for improved lymphatic mobilitiy : in 12 weeks  Patient will perform, verbalize self-management of: skin care;self-monitoring;exercise;massage;self-compression;compression wear;compression care;infection prevention;in 12 weeks  Pt will: demo decreased wound size by 50% for improvement of skin quality and decreased risk for infections in 12 weeks.      Plan / Patient Education:     Continue with initial plan of care.  Progress with home program as tolerated.   Pt will bring in her bandages next visit.  Pt well self bandage at home.     Subjective:   Pt reports she had wound care at the vascular center this morning. Pt reports she was started on a new anti biotic.   Pt states her R LE is in a lot of pain. \" I didn't sleep well last night.\"  Pt has been compliant with her HEP.  Pain Rating: " "10 \"shooting pain.\"        Objective:   Caregiver present: NO    Observation of swelling: unchanged .     Volume measurements taken:  No      Skin condition is:  Dry, red, severe Fibrosis    Compression: no compressoin on when arrived to her appointment.      Medication for infection:  yes      Treatment Today     TREATMENT MINUTES COMMENTS   Evaluation     Self-care/ Home management     Manual therapy 45 Deep breathing  MLD utilizing B LE pump points    Donned size G Comperm ankle to knee L LE.  Pt declined Comperm on R LE due her R leg being very sore to any touch.   Pt with wound dressings on R LE  Pt will bandage when she gets home   Neuromuscular Re-education     Therapeutic Activity     Therapeutic Exercises     Gait training     Modality__________________                Total 45    Blank areas are intentional and mean the treatment did not include these items.       Pooja Rapp,CLT  5/5/2021    "

## 2021-06-17 NOTE — PROGRESS NOTES
[facility] Daily Progress     Patient Name: Elizabeth Kelley  Date: 5/19/2021  Visit #: 6  PTA visit #:  4  Referral Diagnosis: Lymphedema  Referring provider: Rima Melendez, *  Visit Diagnosis:     ICD-10-CM    1. Lymphedema  I89.0    2. Venous stasis ulcer of other part of right lower leg limited to breakdown of skin with varicose veins (H)  I83.018     L97.811    3. Localized swelling of both lower legs  R22.43          Assessment:     Pt reports that she was feeling great last week when she finished her anti-biotics but pain has returned over the past 2 days and pt is not sleeping well - pt is awaiting word back from vascular to see if she will start another anti-biotic.    Pt's volumes measures on R LE skewed due to not removing wound dressings over entire low leg as they were just freshly put on and pt didn't have more wound dressing supplies with - so R lower leg measures bigger over bandages but pt's R knee and thigh measure smaller than 4 weeks ago.     Pt's L LE volumes are 1.5% smaller and pt feels like she is noticing softening of fibrosis after visits.    Patient is benefitting from skilled physical therapy and is making steady progress toward functional goals.  Patient is appropriate to continue with skilled physical therapy intervention, as indicated by initial plan of care.    Goal Status:    Patient will have a decreased volume in : right;LE;by 5%;to decrease risk of infection;for better fit of clothing;for improved body image;for ease of movement;in 12 weeks - IMPROVING    Patient will have decreased fibrosis, scar tissue for improved lymphatic mobilitiy : in 12 weeks - IMPROVING - softening on B medial thighs and L lower leg    Patient will perform, verbalize self-management of: skin care;self-monitoring;exercise;massage;self-compression;compression wear;compression care;infection prevention;in 12 weeks - IMPROVING - performing wound dressing changes and compression wraps    Pt  will: fred decreased wound size by 50% for improvement of skin quality and decreased risk for infections in 12 weeks.- ongoing      Plan / Patient Education:     Continue with initial plan of care.  Progress with home program as tolerated.   Pt awaiting call from vascular for possible second round of anti-biotics.  Pt to continue with HEP, wound dressing changes and compression bandaging.  Continue with MLD and modify compression as needed as wounds heal.    Subjective:     Pt reports she has had 2 bad nights with pain in the leg at the wound area. Pt had a wound cleaning this morning and that was even more painful. Pt's nurse Светлана will talk to Maria G about if pt needs to start another anti-biotic again (had finished her last round last week).   Pt took some tylenol last night due to pain.    Pain Ratin/10 - was 9/10 last night       Objective:   Caregiver present: NO    Observation of swelling: unchanged .     Volume measurements taken:  Yes  Right Lower Extremity Total Estimated Volume (cm3): 99836.76  Left Lower Extremity Total Estimated Volume (cm3): 8243.03  Right LE change of volume from initial (%): 0.8  Left LE change of volume from initial (%): -1.52  Lower Extremity Swelling Comparison (%): 39.14 %    Lymphedema Assessment 2021   Right Lower Extremity Total Estimated Volume (cm3) 19822.78 05368.76   Right LE change of volume from last visit (%) - 0.8   Right LE change of volume from initial (%) - 0.8   Left Lower Extremity Total Estimated Volume (cm3) 8370.65 8243.03   Left LE change of volume from last visit (%) - -1.52   Left LE change of volume from initial (%) - -1.52   Swelling Description Right>Left Right>Left   Lower Extremity Swelling Comparison (%) 35.94 39.14     Skin condition is:  Dry, red, severe Fibrosis    Compression: Comperm B    Medication for infection:  Finished 6 days ago      Treatment Today     TREATMENT MINUTES COMMENTS   Evaluation     Self-care/ Home  management 14 Discussed appt this morning with vascular and how pt is doing her compression with B LEs and wound care.   Manual therapy 54 Reassessed B LE volumes and discussed results.     Manual therapy: manual lymph drainage for bilateral lower extremities lymphedema  Deep abdominal breath, bilateral axilla, bilateral inguinal,abdomen, anterior inguinal to axilla anastomosis on right side and left side, left lower extremity evacuation, right lower extremity evacuation, abdomen effleurage.    Pt will bandage when she gets home   Neuromuscular Re-education     Therapeutic Activity     Therapeutic Exercises     Gait training     Modality__________________                Total 68    Blank areas are intentional and mean the treatment did not include these items.       Bee Olguin PT, DPT, CLT, OCS  5/19/2021

## 2021-06-17 NOTE — PROGRESS NOTES
[facility] Daily Progress     Patient Name: Elizabeth Kelley  Date: 5/17/2021  Visit #: 5  PTA visit #:  4  Referral Diagnosis: Lymphedema  Referring provider: Rima Melendez, *  Visit Diagnosis:     ICD-10-CM    1. Lymphedema  I89.0    2. Venous stasis ulcer of other part of right lower leg limited to breakdown of skin with varicose veins (H)  I83.018     L97.811    3. Localized swelling of both lower legs  R22.43          Assessment:   Pt returns today for her follow up appointment.  Pt with drainage from wound on all layers.  Pt with more pain today after appointment at vascular.   Patient is benefitting from skilled physical therapy and is making steady progress toward functional goals.  Patient is appropriate to continue with skilled physical therapy intervention, as indicated by initial plan of care.    Goal Status:  No data recorded  Patient will have a decreased volume in : right;LE;by 5%;to decrease risk of infection;for better fit of clothing;for improved body image;for ease of movement;in 12 weeks  Patient will have decreased fibrosis, scar tissue for improved lymphatic mobilitiy : in 12 weeks  Patient will perform, verbalize self-management of: skin care;self-monitoring;exercise;massage;self-compression;compression wear;compression care;infection prevention;in 12 weeks  Pt will: demo decreased wound size by 50% for improvement of skin quality and decreased risk for infections in 12 weeks.      Plan / Patient Education:     Continue with initial plan of care.  Progress with home program as tolerated.   Pt well self bandage at home.   Pt will talk to Dr. Melendez regarding the pump at her next visit.    Subjective:   Pt states less drainage from the wound.   Pt states she just finished the antibiotic.   Pt reports she just came from the vascular where the debrided the wound and re dresses the wound. Pt states the nurse suggested a compression pump.   Pt states she is very pleased on her  progressed.   Pt has been compliant with her HEP.    Pain Ratin       Objective:   Caregiver present: NO    Observation of swelling: unchanged .     Volume measurements taken:  No      Skin condition is:  Dry, red, severe Fibrosis    Compression: Comperm B      Medication for infection:  Finished last Thursday.      Treatment Today     TREATMENT MINUTES COMMENTS   Evaluation     Self-care/ Home management     Manual therapy 45 Deep breathing  MLD utilizing B LE pump points    Donned size G Comperm ankle to knee B LE.  Pt has an appointment at the vascular center for wound care.  Pt will bandage when she gets home   Neuromuscular Re-education     Therapeutic Activity     Therapeutic Exercises     Gait training     Modality__________________                Total 45    Blank areas are intentional and mean the treatment did not include these items.       Pooja Rapp,CLT  2021

## 2021-06-17 NOTE — PROGRESS NOTES
"[facility] Daily Progress     Patient Name: Elizabeth Kelley  Date: 5/25/2021  Visit #: 7  PTA visit #:  5  Referral Diagnosis: Lymphedema  Referring provider: Francisco Ramirez MD  Visit Diagnosis:     ICD-10-CM    1. Lymphedema  I89.0    2. Venous stasis ulcer of other part of right lower leg limited to breakdown of skin with varicose veins (H)  I83.018     L97.811    3. Localized swelling of both lower legs  R22.43          Assessment:     Pt reports that she was  Started on another anti biotic. Pt reporting less apin and is sleeping better.  Pt with a drainage on tubes today.        Patient is benefitting from skilled physical therapy and is making steady progress toward functional goals.  Patient is appropriate to continue with skilled physical therapy intervention, as indicated by initial plan of care.    Goal Status:    Patient will have a decreased volume in : right;LE;by 5%;to decrease risk of infection;for better fit of clothing;for improved body image;for ease of movement;in 12 weeks - IMPROVING    Patient will have decreased fibrosis, scar tissue for improved lymphatic mobilitiy : in 12 weeks - IMPROVING - softening on B medial thighs and L lower leg    Patient will perform, verbalize self-management of: skin care;self-monitoring;exercise;massage;self-compression;compression wear;compression care;infection prevention;in 12 weeks - IMPROVING - performing wound dressing changes and compression wraps    Pt will: demo decreased wound size by 50% for improvement of skin quality and decreased risk for infections in 12 weeks.- ongoing      Plan / Patient Education:     Continue with initial plan of care.  Progress with home program as tolerated.     Pt to continue with HEP, wound dressing changes and compression bandaging.  Continue with MLD and modify compression as needed as wounds heal.    Subjective:   \" Better today, the bandages have not been changed for 6 days.\" \" I go there today.\"  Pt " "states the drainage from the wound is now bright yellow \"It kind of smells.\"  Pt is to follow up at the vascular center after her appointment here.   Pt has been sleeping better. Pt does elevate her R LE which helps.  Pt did start the second round of anti biotics.    Pain Ratin/10 currently      Objective:   Caregiver present: NO    Observation of swelling: unchanged .     Volume measurements taken:  No  Right Lower Extremity Total Estimated Volume (cm3): 36846.76  Left Lower Extremity Total Estimated Volume (cm3): 8243.03  Right LE change of volume from initial (%): 0.8  Left LE change of volume from initial (%): -1.52  Lower Extremity Swelling Comparison (%): 39.14 %    Lymphedema Assessment 2021   Right Lower Extremity Total Estimated Volume (cm3) 34086.78 10204.76   Right LE change of volume from last visit (%) - 0.8   Right LE change of volume from initial (%) - 0.8   Left Lower Extremity Total Estimated Volume (cm3) 8370.65 8243.03   Left LE change of volume from last visit (%) - -1.52   Left LE change of volume from initial (%) - -1.52   Swelling Description Right>Left Right>Left   Lower Extremity Swelling Comparison (%) 35.94 39.14     Skin condition is:  Dry, red, severe Fibrosis    Compression: Comperm B    Medication for infection:  Finished 6 days ago      Treatment Today     TREATMENT MINUTES COMMENTS   Evaluation     Self-care/ Home management     Manual therapy 55 Reassessed B LE volumes and discussed results.     Manual therapy: manual lymph drainage for bilateral lower extremities lymphedema  Deep abdominal breath, bilateral axilla, bilateral inguinal,abdomen, anterior inguinal to axilla anastomosis on right side and left side, left lower extremity evacuation, right lower extremity evacuation, abdomen effleurage.    Pt will bandage when she gets home   Neuromuscular Re-education     Therapeutic Activity     Therapeutic Exercises     Gait training     Modality__________________   "              Total 68    Blank areas are intentional and mean the treatment did not include these items.       Pooja Diallo PT, DPT, CLT, OCS  5/25/2021

## 2021-06-17 NOTE — PROGRESS NOTES
"  Patient arrives for consult with Maria G Yeboah CNP. Patient ambulates in and arrives alone. She has comprilan on left leg from ankle to upper calf area. Right leg has telfa, ABD pads and roll gauze. Patient states that her leg is \"very tender\" when touched. Rates her pain \"4\" out of 10. Carolina reports that she typically has to change her dressings 4-5 times daily due to drainage. Large amount of serous drainage noted. Has began lymphedema therapy at [facility].                Wound Information:  VAS Wound 04/28/21 Left inner thigh wound (Active)   Pre Size Length 0.8 04/28/21 1300   Pre Size Width 3 04/28/21 1300   Pre Size Depth 0.2 04/28/21 1300   Pre Total Sq cm 2.4 04/28/21 1300       VAS Wound 04/28/21 RIght lower leg (Active)   Pre Size Length 19 04/28/21 1300   Pre Size Width 28 04/28/21 1300   Pre Size Depth 0.1 04/28/21 1300   Pre Total Sq cm 532 04/28/21 1300     Drainage: Heavy  Thickness:  Partial  Duration of Need: 30  Days Supply: 30  Start Date: 4/28/21  Starter Kit: Ancillary Kit (saline, gloves, gauze)  Qualifying wound/Debridement Yes      Dressing Type Brand Size Number of pieces Frequency of change    Primary Sorbact swab  2.5 x 3.5\" 30 1-2 times daily    Comfeel Plus-hydrocolloid dressing  3 x 3\" or 4 x 4\"  3 times weekly                             "

## 2021-06-17 NOTE — PATIENT INSTRUCTIONS - HE
Wound Care Instructions  RIGHT LOWER LEG:  -Lidocaine 2% jelly under Unna boot covered with ABD pads, rolled gauze and size J/K tubular dressing. Patient to leave in place.  -Patient can removed tubular compression to remove/change ABD pads with ABD pads or diapers and replace tubular compression as needed.    Activity:  -Sleep in bed at night.  -Twice daily (1 hour between normal meal times) lay flat on couch or bed to reduce edema. Re-tighten compression before getting up.  -Perform ankle circles and pumps while lying flat to bring fluid up out of legs.  -Walk as much as tolerated, this is good for edema.    Diet:  -Take a probiotic to offset effects of antibiotic. Do not take within 2 hours of antibiotic.     Bathing:  -You may shower with waterproof dressing cover.  -Do not soak ulcers.  -Do not leave open to air.       SEEK MEDICAL CARE IF:    You have an increase in swelling, pain, or redness around the wound.    You have an increase in the amount of pus coming from the wound.    There is a bad smell coming from the wound.    The wound appears to be worsening/enlarging    You have a fever greater than 101.5 F      Maria G Yeboah, MSN, CNP, CWOCN-AP  [facility]  [phone]        Patient Education   Ketorolac Tromethamine Oral tablet  What is this medicine?  KETOROLAC (lizabeth toe ROLE ak) is a non-steroidal anti-inflammatory drug (NSAID). It is used for a short while to treat moderate to severe pain, including pain after surgery. It should not be used for more than 5 days.  This medicine may be used for other purposes; ask your health care provider or pharmacist if you have questions.  What should I tell my health care provider before I take this medicine?  They need to know if you have any of these conditions:    asthma    bleeding problems like hemophilia    cigarette smoker    drink more than 3 alcohol containing drinks a day    heart disease or circulation problems such as heart failure or  leg edema (fluid retention)    high blood pressure    kidney disease    liver disease    stomach bleeding or ulcers    an unusual or allergic reaction to ketorolac, aspirin, other NSAIDs, other medicines, foods, dyes, or preservatives    pregnant or trying to get pregnant    breast-feeding  How should I use this medicine?  Take this medicine by mouth with a full glass of water. Follow the directions on the prescription label. Take your medicine at regular intervals. Do not take your medicine more often than directed. Do not take more than the recommended dose.  A special MedGuide will be given to you by the pharmacist with each prescription and refill. Be sure to read this information carefully each time.  Talk to your pediatrician regarding the use of this medicine in children. While this drug may be prescribed for children as young as 16 years of age for selected conditions, precautions do apply.  Patients over 65 years old may have a stronger reaction and need a smaller dose.  Overdosage: If you think you have taken too much of this medicine contact a poison control center or emergency room at once.  NOTE: This medicine is only for you. Do not share this medicine with others.  What if I miss a dose?  If you miss a dose, take it as soon as you can. If it is almost time for your next dose, take only that dose. Do not take double or extra doses.  What may interact with this medicine?  Do not take this medicine with any of the following medications:    aspirin and aspirin-like medicines    cidofovir    methotrexate    NSAIDs, medicines for pain and inflammation, like ibuprofen or naproxen    pemetrexed    probenecid  This medicine may also interact with the following medications:    alcohol    alendronate    alprazolam    carbamazepine    cyclosporine    diuretics    flavocoxid    fluoxetine    ginkgo    lithium    medicines for high blood pressure like enalapril    medicines that affect platelets like  pentoxifylline    medicines that treat or prevent blood clots like heparin, warfarin    muscle relaxants    phenytoin    steroid medicines like prednisone or cortisone    thiothixene  This list may not describe all possible interactions. Give your health care provider a list of all the medicines, herbs, non-prescription drugs, or dietary supplements you use. Also tell them if you smoke, drink alcohol, or use illegal drugs. Some items may interact with your medicine.  What should I watch for while using this medicine?  Tell your doctor or health care professional if your pain does not get better. Talk to your doctor before taking another medicine for pain. Do not treat yourself.  This medicine does not prevent heart attack or stroke. In fact, this medicine may increase the chance of a heart attack or stroke. The chance may increase with longer use of this medicine and in people who have heart disease. If you take aspirin to prevent heart attack or stroke, talk with your doctor or health care professional.  Do not take medicines such as ibuprofen and naproxen with this medicine. Side effects such as stomach upset, nausea, or ulcers may be more likely to occur. Many medicines available without a prescription should not be taken with this medicine.  This medicine can cause ulcers and bleeding in the stomach and intestines at any time during treatment. Do not smoke cigarettes or drink alcohol. These increase irritation to your stomach and can make it more susceptible to damage from this medicine. Ulcers and bleeding can happen without warning symptoms and can cause death.  You may get drowsy or dizzy. Do not drive, use machinery, or do anything that needs mental alertness until you know how this medicine affects you. Do not stand or sit up quickly, especially if you are an older patient. This reduces the risk of dizzy or fainting spells.  This medicine can cause you to bleed more easily. Try to avoid damage to your teeth  and gums when you brush or floss your teeth.  What side effects may I notice from receiving this medicine?  Side effects that you should report to your doctor or health care professional as soon as possible:    allergic reactions like skin rash, itching or hives, swelling of the face, lips, or tongue    breathing problems    high blood pressure    nausea, vomiting    redness, blistering, peeling or loosening of the skin, including inside the mouth    severe stomach pain    signs and symptoms of bleeding such as bloody or black, tarry stools; red or dark-brown urine; spitting up blood or brown material that looks like coffee grounds; red spots on the skin; unusual bruising or bleeding from the eye, gums, or nose    signs and symptoms of a stroke like changes in vision; confusion; trouble speaking or understanding; severe headaches; sudden numbness or weakness of the face, arm or leg; trouble walking; dizziness; loss of balance or coordination    trouble passing urine or change in the amount of urine    unexplained weight gain or swelling    unusually weak or tired    yellowing of eyes or skin  Side effects that usually do not require medical attention (report to your doctor or health care professional if they continue or are bothersome):    diarrhea    dizziness    headache    heartburn  This list may not describe all possible side effects. Call your doctor for medical advice about side effects. You may report side effects to FDA at 5-172-FDA-3293.  Where should I keep my medicine?  Keep out of the reach of children.   Store at room temperature between 20 and 25 degrees C (68 and 77 degrees F). Throw away any unused medicine after the expiration date.  NOTE:This sheet is a summary. It may not cover all possible information. If you have questions about this medicine, talk to your doctor, pharmacist, or health care provider. Copyright  2015 [employer]           Patient Education   Ketorolac Tromethamine Oral  tablet  What is this medicine?  KETOROLAC (lizabeth toe ROLE ak) is a non-steroidal anti-inflammatory drug (NSAID). It is used for a short while to treat moderate to severe pain, including pain after surgery. It should not be used for more than 5 days.  This medicine may be used for other purposes; ask your health care provider or pharmacist if you have questions.  What should I tell my health care provider before I take this medicine?  They need to know if you have any of these conditions:    asthma    bleeding problems like hemophilia    cigarette smoker    drink more than 3 alcohol containing drinks a day    heart disease or circulation problems such as heart failure or leg edema (fluid retention)    high blood pressure    kidney disease    liver disease    stomach bleeding or ulcers    an unusual or allergic reaction to ketorolac, aspirin, other NSAIDs, other medicines, foods, dyes, or preservatives    pregnant or trying to get pregnant    breast-feeding  How should I use this medicine?  Take this medicine by mouth with a full glass of water. Follow the directions on the prescription label. Take your medicine at regular intervals. Do not take your medicine more often than directed. Do not take more than the recommended dose.  A special MedGuide will be given to you by the pharmacist with each prescription and refill. Be sure to read this information carefully each time.  Talk to your pediatrician regarding the use of this medicine in children. While this drug may be prescribed for children as young as 16 years of age for selected conditions, precautions do apply.  Patients over 65 years old may have a stronger reaction and need a smaller dose.  Overdosage: If you think you have taken too much of this medicine contact a poison control center or emergency room at once.  NOTE: This medicine is only for you. Do not share this medicine with others.  What if I miss a dose?  If you miss a dose, take it as soon as you can.  If it is almost time for your next dose, take only that dose. Do not take double or extra doses.  What may interact with this medicine?  Do not take this medicine with any of the following medications:    aspirin and aspirin-like medicines    cidofovir    methotrexate    NSAIDs, medicines for pain and inflammation, like ibuprofen or naproxen    pemetrexed    probenecid  This medicine may also interact with the following medications:    alcohol    alendronate    alprazolam    carbamazepine    cyclosporine    diuretics    flavocoxid    fluoxetine    ginkgo    lithium    medicines for high blood pressure like enalapril    medicines that affect platelets like pentoxifylline    medicines that treat or prevent blood clots like heparin, warfarin    muscle relaxants    phenytoin    steroid medicines like prednisone or cortisone    thiothixene  This list may not describe all possible interactions. Give your health care provider a list of all the medicines, herbs, non-prescription drugs, or dietary supplements you use. Also tell them if you smoke, drink alcohol, or use illegal drugs. Some items may interact with your medicine.  What should I watch for while using this medicine?  Tell your doctor or health care professional if your pain does not get better. Talk to your doctor before taking another medicine for pain. Do not treat yourself.  This medicine does not prevent heart attack or stroke. In fact, this medicine may increase the chance of a heart attack or stroke. The chance may increase with longer use of this medicine and in people who have heart disease. If you take aspirin to prevent heart attack or stroke, talk with your doctor or health care professional.  Do not take medicines such as ibuprofen and naproxen with this medicine. Side effects such as stomach upset, nausea, or ulcers may be more likely to occur. Many medicines available without a prescription should not be taken with this medicine.  This medicine can  cause ulcers and bleeding in the stomach and intestines at any time during treatment. Do not smoke cigarettes or drink alcohol. These increase irritation to your stomach and can make it more susceptible to damage from this medicine. Ulcers and bleeding can happen without warning symptoms and can cause death.  You may get drowsy or dizzy. Do not drive, use machinery, or do anything that needs mental alertness until you know how this medicine affects you. Do not stand or sit up quickly, especially if you are an older patient. This reduces the risk of dizzy or fainting spells.  This medicine can cause you to bleed more easily. Try to avoid damage to your teeth and gums when you brush or floss your teeth.  What side effects may I notice from receiving this medicine?  Side effects that you should report to your doctor or health care professional as soon as possible:    allergic reactions like skin rash, itching or hives, swelling of the face, lips, or tongue    breathing problems    high blood pressure    nausea, vomiting    redness, blistering, peeling or loosening of the skin, including inside the mouth    severe stomach pain    signs and symptoms of bleeding such as bloody or black, tarry stools; red or dark-brown urine; spitting up blood or brown material that looks like coffee grounds; red spots on the skin; unusual bruising or bleeding from the eye, gums, or nose    signs and symptoms of a stroke like changes in vision; confusion; trouble speaking or understanding; severe headaches; sudden numbness or weakness of the face, arm or leg; trouble walking; dizziness; loss of balance or coordination    trouble passing urine or change in the amount of urine    unexplained weight gain or swelling    unusually weak or tired    yellowing of eyes or skin  Side effects that usually do not require medical attention (report to your doctor or health care professional if they continue or are  bothersome):    diarrhea    dizziness    headache    heartburn  This list may not describe all possible side effects. Call your doctor for medical advice about side effects. You may report side effects to FDA at 0-505-QEW-2628.  Where should I keep my medicine?  Keep out of the reach of children.   Store at room temperature between 20 and 25 degrees C (68 and 77 degrees F). Throw away any unused medicine after the expiration date.  NOTE:This sheet is a summary. It may not cover all possible information. If you have questions about this medicine, talk to your doctor, pharmacist, or health care provider. Copyright  2015 [employer]

## 2021-06-17 NOTE — PATIENT INSTRUCTIONS - HE
Change your dressing at home as needed to control the drainage.     Leave the xeroform and viscopaste in place and change outer absorbant layers.    SEEK MEDICAL CARE IF:    You have an increase in swelling, pain, or redness around the wound.    You have an increase in the amount of pus coming from the wound.    There is a bad smell coming from the wound.    The wound appears to be worsening/enlarging    You have a fever greater than 101.5 F       When at home please add the foot piece of tubular compression (size F). It is best to have compression on your foot as well as your leg.    Return to clinic on Thursday for appointment with Maria G Yeboah CNP      Swelling and Compression Therapy    Swelling in the legs can be caused by many reasons. No matter what the reason, treatment usually includes some type of compression. This may be done with a support sock, dressing, ace wrap, or layered wraps.     It is important to treat the swelling for many reasons. If the swelling is not treated you may develop blisters that can lead to ulcerations. This is caused when extra fluid goes into tissue causing damage and blocking blood flow to the tissue.     It is important that you wear your compression every day, including days that you will be seen in clinic.     Compression is often the most important part of treating leg wounds. Without controlling the swelling it is often not possible to heal wounds.     Going without compression for even brief periods of time can be damaging to your legs and your health.  Your compression should be put on first thing in the morning. Take the compression off at night only when instructed by your care provider to do so. Sometimes wearing compression 24 hours a day will be recommended.       If you are having difficulty wearing your compression it is important to notify your care provider so that other options may be reviewed.    Please call us if you have any questions [phone].    Thank  you for choosing [facility].

## 2021-06-17 NOTE — PROGRESS NOTES
[facility] Daily Progress     Patient Name: Elizabeth Kelley  Date: 5/10/2021  Visit #: 3  PTA visit #:  2  Referral Diagnosis: Lymphedema  Referring provider: No ref. provider found  Visit Diagnosis:     ICD-10-CM    1. Lymphedema  I89.0    2. Venous stasis ulcer of other part of right lower leg limited to breakdown of skin with varicose veins (H)  I83.018     L97.811    3. Localized swelling of both lower legs  R22.43          Assessment:   Pt returns today for her follow up appointment.  Pt with drainage from wound on all layers.  Pt with less pain today.   Patient is benefitting from skilled physical therapy and is making steady progress toward functional goals.  Patient is appropriate to continue with skilled physical therapy intervention, as indicated by initial plan of care.    Goal Status:  No data recorded  Patient will have a decreased volume in : right;LE;by 5%;to decrease risk of infection;for better fit of clothing;for improved body image;for ease of movement;in 12 weeks  Patient will have decreased fibrosis, scar tissue for improved lymphatic mobilitiy : in 12 weeks  Patient will perform, verbalize self-management of: skin care;self-monitoring;exercise;massage;self-compression;compression wear;compression care;infection prevention;in 12 weeks  Pt will: demo decreased wound size by 50% for improvement of skin quality and decreased risk for infections in 12 weeks.      Plan / Patient Education:     Continue with initial plan of care.  Progress with home program as tolerated.   Pt well self bandage at home.     Subjective:     Pain Rating: 3  Pt states she is having some R leg pain.  Pt has been compliant with her HEP. Requested new hand out of exercises.  Pt states she felt good after the last treatment.    Objective:   Caregiver present: NO    Observation of swelling: unchanged .     Volume measurements taken:  No      Skin condition is:  Dry, red, severe Fibrosis    Compression: Comperm  B      Medication for infection:  yes      Treatment Today     TREATMENT MINUTES COMMENTS   Evaluation     Self-care/ Home management     Manual therapy 45 Deep breathing  MLD utilizing B LE pump points    Donned size G Comperm ankle to knee B LE.  Pt has an appointment at the vascular center for wound care.  Pt will bandage when she gets home   Neuromuscular Re-education     Therapeutic Activity     Therapeutic Exercises     Gait training     Modality__________________                Total 45    Blank areas are intentional and mean the treatment did not include these items.       Pooja Rapp,CLT  5/10/2021

## 2021-06-17 NOTE — TELEPHONE ENCOUNTER
Called patient. She was tearful. She states she did not sleep last night at all. Pain started last night. She tried changing dressing and applying the Sorbact increased her pain. Writer said that drainage can sometimes cause pain and she states she's been dealing with the excess drainage since December and this pain was different from the ones she has had. JASPAL Cummins, is in today and can see her. Set patient up to come in today.

## 2021-06-17 NOTE — TELEPHONE ENCOUNTER
Pt was updated on ABX and instructions and recommendations from Maria G below. She had no further questions.

## 2021-06-17 NOTE — PATIENT INSTRUCTIONS - HE
Change your dressing at home as needed to control the drainage.      Leave the xeroform and viscopaste in place and change outer absorbant layers.     SEEK MEDICAL CARE IF:    You have an increase in swelling, pain, or redness around the wound.    You have an increase in the amount of pus coming from the wound.    There is a bad smell coming from the wound.    The wound appears to be worsening/enlarging    You have a fever greater than 101.5 F        When at home please add the foot piece of tubular compression (size F). It is best to have compression on your foot as well as your leg.           Swelling and Compression Therapy     Swelling in the legs can be caused by many reasons. No matter what the reason, treatment usually includes some type of compression. This may be done with a support sock, dressing, ace wrap, or layered wraps.      It is important to treat the swelling for many reasons. If the swelling is not treated you may develop blisters that can lead to ulcerations. This is caused when extra fluid goes into tissue causing damage and blocking blood flow to the tissue.      It is important that you wear your compression every day, including days that you will be seen in clinic.      Compression is often the most important part of treating leg wounds. Without controlling the swelling it is often not possible to heal wounds.      Going without compression for even brief periods of time can be damaging to your legs and your health.  Your compression should be put on first thing in the morning. Take the compression off at night only when instructed by your care provider to do so. Sometimes wearing compression 24 hours a day will be recommended.        If you are having difficulty wearing your compression it is important to notify your care provider so that other options may be reviewed.     Please call us if you have any questions [phone].     Thank you for choosing [facility].

## 2021-06-17 NOTE — TELEPHONE ENCOUNTER
Spoke to Elizabeth. She was in pain last evening with the compression on her right leg. She said it is much better today. Let her know Maria G Yeboah CNP is recommending that she try putting the tubular compression on underneath the comprilan and see if that is more comfortable for her. She will try this and update us as with questions or concerns.     Updated patient's pharmacy information.

## 2021-06-17 NOTE — TELEPHONE ENCOUNTER
"Is having shooting pains in her legs and was not able to sleep all night due to the pain.    Rates her pain at a 10/10.    It is the right leg.    Was not sure if the pain was being caused by the mesh so at 3am she changed the changed the mesh and bandages and leg looked \"really inflamed\" more so than usual.     She took one Aleve and 1000 mg of tylenol and that has not helped    The swelling is the same as usual.    Denies any fever but she didn't check    No chest pain, back pain, no breathing issues, no numbness or tingling    The foot has normal sensation and isn't blue or cold    Is able to walk OK    Uses the [facility] in [address]    Can reach the patient at [phone] and it is OK to leave a detailed message    Wanda Barron RN   [facility] Medication Refill and Triage Nurse  7:31 AM  4/30/2021        Additional Information    Negative: Looks like a broken bone or dislocated joint (e.g., crooked or deformed)    Negative: Sounds like a life-threatening emergency to the triager    Negative: Chest pain    Negative: Difficulty breathing    Negative: Entire foot is cool or blue in comparison to other side    Negative: Unable to walk    Negative: [1] Red area or streak AND [2] fever    Negative: [1] Swollen joint AND [2] fever    Negative: [1] Cast on leg or ankle AND [2] now increased pain    Negative: Patient sounds very sick or weak to the triager    [1] SEVERE pain (e.g., excruciating, unable to do any normal activities) AND [2] not improved after 2 hours of pain medicine    Protocols used: LEG PAIN-A-AH      "

## 2021-06-17 NOTE — TELEPHONE ENCOUNTER
I left a message for Elizabeth regarding her phone call to us and vascular. She had stated the bandages had fallen off. We did not do compression bandages today. She had not brought her bandages in with her. A Comperm tube was put on her L LE. No compression on the R due to pain. Pt was instructed to bandage when she got home. I am assuming what fell down was the wound dressing.  Alexandra Diallo pTA,CLT

## 2021-06-17 NOTE — TELEPHONE ENCOUNTER
Patient called the vascular clinic today stating that her leg wraps have completely fallen off and she is wondering what she should do. Per Maria G Yeboah CNP's team patient should speak with lymphedema therapy dept. Patient can be reached at: [phone]

## 2021-06-17 NOTE — TELEPHONE ENCOUNTER
"----- Message from Maria G Yeboah CNP sent at 5/19/2021  1:06 PM CDT -----  I sent through a prescription for doxycycline (Monodox, Vibramycin) to her pharmacy on file. This should help with any infection in 72 hours. If pain worsens or patient develops fever, chills, weakness, she should present to ED over the weekend.     Please remind patient to eat yogurt and/or take probiotic at a different time of the day when taking antibiotic. Also review correct elevation and avoidance of salt as increased edema can result in increased redness, pain, and warmth, and mimic an infection. The more antibiotics the patient ends up taking the higher the risk for a severe GI infection called C.diff.    Thank you.  ----- Message -----  From: Kelsy Navarro RN  Sent: 5/19/2021  10:03 AM CDT  To: Maria G Yeboah CNP    Carolina Cummins was in today and is having increased pain in right leg rating it 8-9 out of 10. She was tearful throughout appointment and was unable to sleep last night due to leg discomfort. There is more erythema in comparison to nurse visit on Monday (see photos) and there was some warmth noted. Carolina states she felt \"much better\" earlier in the week. She has been off antibiotic since 5/12. You have follow up with her next Thursday. Wondering if you want to restart antibiotic?  Thanks,  Светлана"

## 2021-06-17 NOTE — PATIENT INSTRUCTIONS - HE
Change your dressing at home as needed to control the drainage.      Leave the xeroform and viscopaste in place and change outer absorbant layers.     SEEK MEDICAL CARE IF:    You have an increase in swelling, pain, or redness around the wound.    You have an increase in the amount of pus coming from the wound.    There is a bad smell coming from the wound.    The wound appears to be worsening/enlarging    You have a fever greater than 101.5 F        When at home please add the foot piece of tubular compression (size F). It is best to have compression on your foot as well as your leg.           Swelling and Compression Therapy     Swelling in the legs can be caused by many reasons. No matter what the reason, treatment usually includes some type of compression. This may be done with a support sock, dressing, ace wrap, or layered wraps.      It is important to treat the swelling for many reasons. If the swelling is not treated you may develop blisters that can lead to ulcerations. This is caused when extra fluid goes into tissue causing damage and blocking blood flow to the tissue.      It is important that you wear your compression every day, including days that you will be seen in clinic.      Compression is often the most important part of treating leg wounds. Without controlling the swelling it is often not possible to heal wounds.      Going without compression for even brief periods of time can be damaging to your legs and your health.  Your compression should be put on first thing in the morning. Take the compression off at night only when instructed by your care provider to do so. Sometimes wearing compression 24 hours a day will be recommended.        If you are having difficulty wearing your compression it is important to notify your care provider so that other options may be reviewed.     Please call us if you have any questions [phone].     Thank you for choosing [facility].

## 2021-06-17 NOTE — PATIENT INSTRUCTIONS - HE
Wound Care Instructions  Right lower leg:    Change your dressing at home as needed to control the drainage.     Leave the xeroform and viscopaste in place and change outer absorbant layers.    SEEK MEDICAL CARE IF:    You have an increase in swelling, pain, or redness around the wound.    You have an increase in the amount of pus coming from the wound.    There is a bad smell coming from the wound.    The wound appears to be worsening/enlarging    You have a fever greater than 101.5 F       When at home please add the foot piece of tubular compression (size F). It is best to have compression on your foot as well as your leg.    Return to clinic on Thursday for appointment with Maria G Yeboah CNP      Swelling and Compression Therapy    Swelling in the legs can be caused by many reasons. No matter what the reason, treatment usually includes some type of compression. This may be done with a support sock, dressing, ace wrap, or layered wraps.     It is important to treat the swelling for many reasons. If the swelling is not treated you may develop blisters that can lead to ulcerations. This is caused when extra fluid goes into tissue causing damage and blocking blood flow to the tissue.     It is important that you wear your compression every day, including days that you will be seen in clinic.     Compression is often the most important part of treating leg wounds. Without controlling the swelling it is often not possible to heal wounds.     Going without compression for even brief periods of time can be damaging to your legs and your health.  Your compression should be put on first thing in the morning. Take the compression off at night only when instructed by your care provider to do so. Sometimes wearing compression 24 hours a day will be recommended.       If you are having difficulty wearing your compression it is important to notify your care provider so that other options may be reviewed.    Please call us if  you have any questions [phone].    Thank you for choosing [facility].

## 2021-06-17 NOTE — PATIENT INSTRUCTIONS - HE
Wound Care Instructions  RIGHT LOWER LEG:  -Leave dressings in place. Return on Friday for dressing change with nurse.     Activity:  -Sleep in bed at night.  -Twice daily (1 hour between normal meal times) lay flat on couch or bed to reduce edema. Re-tighten compression before getting up.  -Perform ankle circles and pumps while lying flat to bring fluid up out of legs.  -Walk as much as tolerated, this is good for edema.    Diet:  -Take a probiotic to offset effects of antibiotic. Do not take within 2 hours of antibiotic.     Bathing:  -You may shower with waterproof dressing cover.  -Do not soak ulcers.  -Do not leave open to air.       SEEK MEDICAL CARE IF:    You have an increase in swelling, pain, or redness around the wound.    You have an increase in the amount of pus coming from the wound.    There is a bad smell coming from the wound.    The wound appears to be worsening/enlarging    You have a fever greater than 101.5 F      Maria G Yeboah, MSN, CNP, CWOCN-AP  [facility]  [phone]

## 2021-06-17 NOTE — TELEPHONE ENCOUNTER
"Caller report she had to remove compression dressing to right leg la was placed earlier today for treatment of leg ulcer and lymph edema; States was experieincing burning pain \"like it was on fire and had no choice; pain is at  Baseline now that  bandage is off; Has taken acetaminophen  100 mg  For the pain and does not feel she needs addition analgesia at this point   Caller is advised that  Provider will be messaged with  this triage report and requests from patient to be called tomorrow regarding further treatment   Caller understands and will anticipate call back   Joyce Staton RN  FNA       Reason for Disposition    Leg pain    Additional Information    Negative: Followed a leg injury    Negative: Leg swelling is main symptom    Negative: Back pain radiating (shooting) into leg(s)    Negative: Knee pain is main symptom    Negative: Ankle pain is main symptom    Negative: Pregnant    Negative: Postpartum (from 0 to 6 weeks after delivery)    Negative: Chest pain    Negative: Difficulty breathing    Negative: Entire foot is cool or blue in comparison to other side    Negative: Unable to walk    Negative: [1] Red area or streak AND [2] fever    Negative: [1] Swollen joint AND [2] fever    Negative: [1] Cast on leg or ankle AND [2] now increased pain    Negative: Patient sounds very sick or weak to the triager    Protocols used: LEG PAIN-A-AH      "

## 2021-06-18 ENCOUNTER — COMMUNICATION - HEALTHEAST (OUTPATIENT)
Dept: VASCULAR SURGERY | Facility: CLINIC | Age: 76
End: 2021-06-18

## 2021-06-18 NOTE — PATIENT INSTRUCTIONS - HE
"Patient Instructions by Maria G Yeboah CNP at 4/28/2021  1:00 PM     Author: Maria G Yeboah CNP Service: -- Author Type: Nurse Practitioner    Filed: 4/28/2021  2:26 PM Encounter Date: 4/28/2021 Status: Addendum    : Kelsy Navarro RN (Registered Nurse)    Related Notes: Original Note by Maria G Yeboah CNP (Nurse Practitioner) filed at 4/28/2021  2:17 PM       Wound Care Instructions  RIGHT LOWER LEG:  Cleanse with wound wash or bottled water. Use non-sterile 4x4 gauze to gently remove any loosening tissue or debris and pat dry with non-sterile gauze.     Primary Dressing: Apply two Sorbact Swab 2.8x3.5\" over the wounds.    Secondary dressing: Cover with four size 5 baby diapers, with leg gussets removed. Wrap around the leg and tape to itself. This will allow you to contour to the shape of your leg.    Change green dressing daily. Change baby diapers 1-2 x daily per comfort.     Secure with Comprilan 10 cm x 5 m, and size F and G tubular compression.    LEFT THIGH:  Remove old dressing by slowly lifting the edges in a circular fashion. Do not remove by pulling from one side to the other. It is normal to see a yellow \"booger\".    Cleanse with wound wash or bottled water. Pat dry with non sterile gauze.    Primary dressing: Apply a 3x3\" hydrocolloid dressing.     Change three times weekly.     Compression:  -Apply Comprilan and tubular compression.    -Compression should be applied first thing in the morning and can be removed just prior to bed.        ComprilanWrapping Instructions                 1. Before bandaging, carefully      Apply lotion into the skin.   Avoid any open sores      2. Measure out the tubular bandage  (Stockinette) to be used as underwrap:  twice the leg length from the tip of the  foot to the groin.        3. Gather up the bandage  (Stockinette) along its entire length..    4. ...and pull onto the leg as far as  the groin. The section remaining at  both " ends is later pulled over the  padding material.      5.   Begin with two turns of the        short-stretch bandage       (Comprilan ) foot     No bandage tension...      6.  Take the bandage into a         extended figure of eight    7.  The bandage runs in figures of       eight 2-3 times around the upper       ankle and the foot...    8.  Bandage the rest of the lower leg      In a circular method up to the knee.    Between the individual turns of bandage is especially important. The bandage should  be smoothed down well and creasing avoided.    Compression bandages used in the management of lymphedema should be properly washed on a regular basis, so skin cells and oils wont become trapped in the fibers of the bandages and damage the integrity of the textile. Compression bandages may be machine or hand washed; machine wash is generally the preferred method. Once the bandages go through the spin cycle they are easy to hang and will dry much faster.  Daily washing is recommended, especially if lotions or creams are being used. If the bandages are machine washed it is recommended to place the unrolled bandages in a mesh laundry bag in order to protect the fabric during the washing cycle (the gentle cycle should be utilized).  Bandages are best washed in warm water (between 108 - 140oF); if the bandages are very dirty, they may be boil-washed up to 203oF.  It is best to have more than one set of bandages - one to wear, one to wash. The sets should be applied alternately to allow the build in elasticity of the bandages to recover and to prolong their effectiveness.    Tips for hand washing procedures:  1. Start by filling a bowl, bucket, sink, or small tub with water.   2. The compression bandages should be dipped gently into the water to dampen.   3. Add a small amount of washing solution (see below).   4. Let the compression bandages soak for a few minutes.   5. For better cleaning, gently rub the fibers of the  compression bandages together without stretching them excessively.   6. Then, empty the tub and refill with water - dip or rinse the clean compression bandages thoroughly to rid the bandages of residual salts and oils from perspiration.   7. Gently squeeze the compression bandages to remove excess water.   8. Lay flat to dry    -Compression MUST be worn everyday from toes to knee.  -Wear wound dressing and compression at all times, including appointments.    Activity:  -Sleep in bed at night.  -Twice daily (1 hour between normal meal times) lay flat on couch or bed to reduce edema. Re-tighten compression before getting up.  -Perform ankle circles and pumps or lymphedema exercises while lying flat to bring fluid up out of legs.  -Walk as much as tolerated, this is good for edema.    Diet:  -Avoid salt and sugar as this retains water and will worsen edema.    Bathing:  -You may shower with waterproof dressing over the right lower leg. Following shower, you can rinse the right lower leg with luke warm water to help remove the dressing.  -Do not soak ulcers.  -Do not leave open to air.     Nutrition helpful to healing:  -Multivitamin with Mineral (ex:Centrum), daily.  -Vitamin C 500 mg, daily.  -Zinc 25-50 mg, daily for 14 days,then stop.   -5-6 servings of protein daily, unless limited by Kidney healthcare provider.  -6-8 eight ounce glasses of water, unless limited by another healthcare provider.      SEEK MEDICAL CARE IF:    You have an increase in swelling, pain, or redness around the wound.    You have an increase in the amount of pus coming from the wound.    There is a bad smell coming from the wound.    The wound appears to be worsening/enlarging    You have a fever greater than 101.5 F      It is ok to continue current wound care treatment/products for the next 2-3 days until new wound care supplies are ordered and arrive. If longer than this please contact our office at [phone].    Maria G Yeboah, MSN,  CNP, FLORIN-AP  [facility]  [phone]          To reorder supplies or if you have any questions about your order please call [place] at [phone]      or Concerns Contact:      Ella Mackenzie  Wound Team Care Coordinator   [address] [place] [address]  t [phone] Ext. [phone]  f [phone]  [email or web address]  [email or web address]

## 2021-06-18 NOTE — PATIENT INSTRUCTIONS - HE
Patient Instructions by Bee Olguin PT at 4/21/2021  9:00 AM     Author: Bee Olguin PT Service: -- Author Type: Physical Therapist    Filed: 4/21/2021 10:02 AM Encounter Date: 4/21/2021 Status: Signed    : Bee Olguin PT (Physical Therapist)       LOWER EXTREMITY LYMPHEDEMA EXERCISES

## 2021-06-21 NOTE — LETTER
Letter by Maria G Yeboah CNP at      Author: Maria G Yeboah CNP Service: -- Author Type: --    Filed:  Encounter Date: 4/30/2021 Status: (Other)         April 30, 2021     Patient: Elizabeth Kelley   YOB: 1945   Date of Visit: 4/30/2021       To Whom It May Concern:    It is my medical opinion that Elizabeth Kelley should remain out of work on Saturday and Sunday (5/1 and 5/2).    If you have any questions or concerns, please don't hesitate to call.    Sincerely,        Electronically signed by Maria G Yeboah CNP

## 2021-06-21 NOTE — LETTER
Letter by Maria G Yeboah CNP at      Author: Maria G Yeboah CNP Service: -- Author Type: --    Filed:  Encounter Date: 2021 Status: (Other)         2021    [facility]  Fax: [phone] Wound Dressing Rx and Order Form  Customer Service: [phone] Order Status: New Order   Verbal: Светлана KEN RN/Maria G Yeboah CNP            Patient Info:  Name: Elizabeth Kelley  : 1945  Address:   [address] [place] [address]  Phone: [phone]      Insurance Info:  Primary: Payor: BLUE CROSS / Plan: [employer] [place] MEDICARE ADVANTAGE / Product Type: MEDICARE ADVANTAGE /    Secondary: [facility] UMR  [id number] - (Commercial)    Physician Info:   Name:  Maria G Yeboah CNP   Dept Address/Phones:   [address] [place] [address], [address]  [facility] [address] [phone]  Fax: [phone]    Lymphedema circumferential measurements (in cm):  No data recorded  No data recorded  No data recorded  No data recorded  No data recorded  No data recorded  No data recorded  No data recorded  Right just above MTP: 23.6    Right Ankle: 24.5    Right Widest Calf: 62.2    Right Thigh Up 10cm: 77.4    Left - just above MTP: 22.5    Left Ankle: 24.5    Left Widest Calf: 56.6    Left Thigh Up 10cm: 76.8      Wound info:  Encounter Diagnoses   Name Primary?   ? Skin ulcer of right lower leg, limited to breakdown of skin (H) Yes   ? 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 Venous insufficiency of both lower extremities    ? Acquired lymphedema of leg    ? Lipedema    ? Vitamin D deficiency      VASC Wound 21 Left inner thigh wound (Active)   Pre Size Length 0.8 21 1300   Pre Size Width 3 21 1300   Pre Size Depth 0.2 21 1300   Pre Total Sq cm 2.4 21 1300       VASC Wound 21 RIght lower leg (Active)   Pre Size Length 19 21 1300   Pre Size Width 28 21 1300   Pre Size Depth 0.1 21  "1300   Pre Total Sq cm 532 04/28/21 1300     Drainage: Heavy  Thickness:  Partial  Duration of Need: 30  Days Supply: 30  Start Date: 4/28/21  Starter Kit: Ancillary Kit (saline, gloves, gauze)  Qualifying wound/Debridement Yes      Dressing Type Brand Size Number of pieces Frequency of change    Primary Sorbact swab  2.5 x 3.5\" 30 1-2 times daily    Comfeel Plus-hydrocolloid dressing  3 x 3\" or 4 x 4\"  3 times weekly                                   Secondary                                        Tape                            Note: If total out of pocket is more than $50.00 please contact the patient before processing order.     OK to forward to covered supplier.    Electronically Signed Physician: Maria G Yeboah CNP Date: 4/28/2021         "

## 2021-06-22 ENCOUNTER — AMBULATORY - HEALTHEAST (OUTPATIENT)
Dept: VASCULAR SURGERY | Facility: CLINIC | Age: 76
End: 2021-06-22

## 2021-06-22 DIAGNOSIS — E66.01 MORBID OBESITY WITH BMI OF 50.0-59.9, ADULT (H): ICD-10-CM

## 2021-06-22 DIAGNOSIS — I89.0 ACQUIRED LYMPHEDEMA OF LEG: ICD-10-CM

## 2021-06-22 DIAGNOSIS — I87.2 VENOUS INSUFFICIENCY OF BOTH LOWER EXTREMITIES: ICD-10-CM

## 2021-06-22 DIAGNOSIS — L97.911 SKIN ULCER OF RIGHT LOWER LEG, LIMITED TO BREAKDOWN OF SKIN (H): ICD-10-CM

## 2021-06-25 NOTE — TELEPHONE ENCOUNTER
Gabriele  from [facility] requesting  call back regarding  signed order for  DME to be faxed;     Please call @ [phone]    Joyce Staton RN  FNA                 Reason for Disposition    Nursing judgment    Protocols used: NO PROTOCOL AVAILABLE - INFORMATION ONLY-A-[place]

## 2021-06-26 ENCOUNTER — HEALTH MAINTENANCE LETTER (OUTPATIENT)
Age: 76
End: 2021-06-26

## 2021-06-26 NOTE — PROGRESS NOTES
[facility] Rehabilitation Daily Progress     Patient Name: Elizabeth Kelley  Date: 6/14/2021  Visit #: 12  PTA visit #:  9  Referral Diagnosis: Lymphedema  Referring provider: Francisco Ramirez MD  Visit Diagnosis:     ICD-10-CM    1. Lymphedema  I89.0    2. Venous stasis ulcer of other part of right lower leg limited to breakdown of skin with varicose veins (H)  I83.018     L97.811    3. Localized swelling of both lower legs  R22.43          Assessment:   Patient tolerated session well. She states the wound is looking better.  Patient tolerated MLD well. Some tenderness to the lower leg and avoided over wound area. No weeping noted into the tubular compression, Patient remains appropriate for skilled therapy.     Patient is benefitting from skilled physical therapy and is making steady progress toward functional goals.  Patient is appropriate to continue with skilled physical therapy intervention, as indicated by initial plan of care.    Goal Status:    Patient will have a decreased volume in : right;LE;by 5%;to decrease risk of infection;for better fit of clothing;for improved body image;for ease of movement;in 12 weeks - IMPROVING    Patient will have decreased fibrosis, scar tissue for improved lymphatic mobilitiy : in 12 weeks - IMPROVING - softening on B medial thighs and L lower leg    Patient will perform, verbalize self-management of: skin care;self-monitoring;exercise;massage;self-compression;compression wear;compression care;infection prevention;in 12 weeks - IMPROVING - performing wound dressing changes and compression wraps    Pt will: demo decreased wound size by 50% for improvement of skin quality and decreased risk for infections in 12 weeks.- ongoing      Plan / Patient Education:     Continue with initial plan of care.  Progress with home program as tolerated.    Pt to continue with HEP, wound dressing changes and compression bandaging as needed  Continue with MLD and modify compression as  needed as wounds heal.  Re measure volumes next visit.     Subjective:   Pt states Kimmy from [employer] will come to her appointment at the vascular center. Kimmy will instruct Elizabeth on the use of the pump. Pt states she thinks this should happen next week.  Pt had wound care at the vascular center last Friday. Pt states they have decreased the amount of wound care wraps that are used.    Pain Ratin/10 currently      Objective:   Caregiver present: NO    Observation of swelling: unchanged .     Volume measurements taken:  No  Right Lower Extremity Total Estimated Volume (cm3): 46705.76  Left Lower Extremity Total Estimated Volume (cm3): 8243.03  Right LE change of volume from initial (%): 0.8  Left LE change of volume from initial (%): -1.52  Lower Extremity Swelling Comparison (%): 39.14 %    Lymphedema Assessment 2021   Right Lower Extremity Total Estimated Volume (cm3) 72906.78 14106.76   Right LE change of volume from last visit (%) - 0.8   Right LE change of volume from initial (%) - 0.8   Left Lower Extremity Total Estimated Volume (cm3) 8370.65 8243.03   Left LE change of volume from last visit (%) - -1.52   Left LE change of volume from initial (%) - -1.52   Swelling Description Right>Left Right>Left   Lower Extremity Swelling Comparison (%) 35.94 39.14     Skin condition is:  Dry, red, severe Fibrosis    Compression: Comperm B bilaterally -  Wound bandages in place today     Medication for infection:  Not currently      Treatment Today     TREATMENT MINUTES COMMENTS   Evaluation     Self-care/ Home management     Manual therapy 43   Manual therapy: manual lymph drainage for bilateral lower extremities lymphedema  Deep abdominal breath, bilateral axilla, bilateral inguinal,abdomen, anterior inguinal to axilla anastomosis on right side and left side, left lower extremity evacuation, right lower extremity evacuation, abdomen effleurage.    Pt will get wound care after session and they  will re-wrap her today    Neuromuscular Re-education     Therapeutic Activity     Therapeutic Exercises     Gait training     Modality__________________                Total 43 Pt was 11 minutes for her appt   Blank areas are intentional and mean the treatment did not include these items.       Pooja Diallo PTA,CLT  6/14/2021

## 2021-06-26 NOTE — PATIENT INSTRUCTIONS - HE
Wound Care Instructions  Right lower leg:     Change your dressing at home as needed to control the drainage.      Leave the xeroform and viscopaste in place and change outer absorbant layers.     SEEK MEDICAL CARE IF:    You have an increase in swelling, pain, or redness around the wound.    You have an increase in the amount of pus coming from the wound.    There is a bad smell coming from the wound.    The wound appears to be worsening/enlarging    You have a fever greater than 101.5 F        When at home please add the foot piece of tubular compression (size F). It is best to have compression on your foot as well as your leg.     Return to clinic on Thursday for appointment with Maria G Yeboah CNP        Swelling and Compression Therapy     Swelling in the legs can be caused by many reasons. No matter what the reason, treatment usually includes some type of compression. This may be done with a support sock, dressing, ace wrap, or layered wraps.      It is important to treat the swelling for many reasons. If the swelling is not treated you may develop blisters that can lead to ulcerations. This is caused when extra fluid goes into tissue causing damage and blocking blood flow to the tissue.      It is important that you wear your compression every day, including days that you will be seen in clinic.      Compression is often the most important part of treating leg wounds. Without controlling the swelling it is often not possible to heal wounds.      Going without compression for even brief periods of time can be damaging to your legs and your health.  Your compression should be put on first thing in the morning. Take the compression off at night only when instructed by your care provider to do so. Sometimes wearing compression 24 hours a day will be recommended.        If you are having difficulty wearing your compression it is important to notify your care provider so that other options may be  reviewed.     Please call us if you have any questions [phone].     Thank you for choosing  [employer] [place].

## 2021-06-26 NOTE — PATIENT INSTRUCTIONS - HE
Wound Care Instructions  Right lower leg:     Change your dressing at home as needed to control the drainage.      Leave the xeroform and viscopaste in place and change outer absorbant layers.     SEEK MEDICAL CARE IF:    You have an increase in swelling, pain, or redness around the wound.    You have an increase in the amount of pus coming from the wound.    There is a bad smell coming from the wound.    The wound appears to be worsening/enlarging    You have a fever greater than 101.5 F        When at home please add the foot piece of tubular compression (size F). It is best to have compression on your foot as well as your leg.     Return to clinic on Thursday for appointment with Maria G Yeboah CNP        Swelling and Compression Therapy     Swelling in the legs can be caused by many reasons. No matter what the reason, treatment usually includes some type of compression. This may be done with a support sock, dressing, ace wrap, or layered wraps.      It is important to treat the swelling for many reasons. If the swelling is not treated you may develop blisters that can lead to ulcerations. This is caused when extra fluid goes into tissue causing damage and blocking blood flow to the tissue.      It is important that you wear your compression every day, including days that you will be seen in clinic.      Compression is often the most important part of treating leg wounds. Without controlling the swelling it is often not possible to heal wounds.      Going without compression for even brief periods of time can be damaging to your legs and your health.  Your compression should be put on first thing in the morning. Take the compression off at night only when instructed by your care provider to do so. Sometimes wearing compression 24 hours a day will be recommended.        If you are having difficulty wearing your compression it is important to notify your care provider so that other options may be  reviewed.     Please call us if you have any questions [phone].     Thank you for choosing  [employer] [place].

## 2021-06-26 NOTE — PATIENT INSTRUCTIONS - HE
Wound Care Instructions  Right lower leg:    Change your dressing at home as needed to control the drainage.     Leave the xeroform and viscopaste in place and change outer absorbant layers.    SEEK MEDICAL CARE IF:    You have an increase in swelling, pain, or redness around the wound.    You have an increase in the amount of pus coming from the wound.    There is a bad smell coming from the wound.    The wound appears to be worsening/enlarging    You have a fever greater than 101.5 F       When at home please add the foot piece of tubular compression (size F). It is best to have compression on your foot as well as your leg.    Return to clinic on Thursday for appointment with Maria G Yeboah CNP      Swelling and Compression Therapy    Swelling in the legs can be caused by many reasons. No matter what the reason, treatment usually includes some type of compression. This may be done with a support sock, dressing, ace wrap, or layered wraps.     It is important to treat the swelling for many reasons. If the swelling is not treated you may develop blisters that can lead to ulcerations. This is caused when extra fluid goes into tissue causing damage and blocking blood flow to the tissue.     It is important that you wear your compression every day, including days that you will be seen in clinic.     Compression is often the most important part of treating leg wounds. Without controlling the swelling it is often not possible to heal wounds.     Going without compression for even brief periods of time can be damaging to your legs and your health.  Your compression should be put on first thing in the morning. Take the compression off at night only when instructed by your care provider to do so. Sometimes wearing compression 24 hours a day will be recommended.       If you are having difficulty wearing your compression it is important to notify your care provider so that other options may be reviewed.    Please call us if  you have any questions [phone].    Thank you for choosing [facility].

## 2021-06-26 NOTE — PATIENT INSTRUCTIONS - HE
Continue to change the diaper pads daily at home and come in for full dressing changes as scheduled.

## 2021-06-26 NOTE — PROGRESS NOTES
[facility] Rehabilitation Daily Progress     Patient Name: Elizabeth Kelley  Date: 6/4/2021  Visit #: 9  PTA visit #:  6  Referral Diagnosis: Lymphedema  Referring provider: Francisco Ramirez MD  Visit Diagnosis:     ICD-10-CM    1. Lymphedema  I89.0    2. Venous stasis ulcer of other part of right lower leg limited to breakdown of skin with varicose veins (H)  I83.018     L97.811    3. Localized swelling of both lower legs  R22.43          Assessment:   Patient tolerated session well. She states the wound is looking better, bleeding some today but not weeping. She has appointment with wound care after this session where they will redress and wrap legs.   Patient tolerated MLD well. Some tenderness to the lower leg and avoided over wound area. No weeping noted into the tubular compression, Patient remains appropriate for skilled therapy.     Patient is benefitting from skilled physical therapy and is making steady progress toward functional goals.  Patient is appropriate to continue with skilled physical therapy intervention, as indicated by initial plan of care.    Goal Status:    Patient will have a decreased volume in : right;LE;by 5%;to decrease risk of infection;for better fit of clothing;for improved body image;for ease of movement;in 12 weeks - IMPROVING    Patient will have decreased fibrosis, scar tissue for improved lymphatic mobilitiy : in 12 weeks - IMPROVING - softening on B medial thighs and L lower leg    Patient will perform, verbalize self-management of: skin care;self-monitoring;exercise;massage;self-compression;compression wear;compression care;infection prevention;in 12 weeks - IMPROVING - performing wound dressing changes and compression wraps    Pt will: demo decreased wound size by 50% for improvement of skin quality and decreased risk for infections in 12 weeks.- ongoing      Plan / Patient Education:     Continue with initial plan of care.  Progress with home program as tolerated.     Pt to continue with HEP, wound dressing changes and compression bandaging as needed  Continue with MLD and modify compression as needed as wounds heal.    Subjective:       Pain Ratin/10 currently  It is better today than yesterday. She will be going to wound car after there, they will change dressing and then apply wraps there.     Objective:   Caregiver present: NO    Observation of swelling: unchanged .     Volume measurements taken:  No  Right Lower Extremity Total Estimated Volume (cm3): 05346.76  Left Lower Extremity Total Estimated Volume (cm3): 8243.03  Right LE change of volume from initial (%): 0.8  Left LE change of volume from initial (%): -1.52  Lower Extremity Swelling Comparison (%): 39.14 %    Lymphedema Assessment 2021   Right Lower Extremity Total Estimated Volume (cm3) 52929.78 16385.76   Right LE change of volume from last visit (%) - 0.8   Right LE change of volume from initial (%) - 0.8   Left Lower Extremity Total Estimated Volume (cm3) 8370.65 8243.03   Left LE change of volume from last visit (%) - -1.52   Left LE change of volume from initial (%) - -1.52   Swelling Description Right>Left Right>Left   Lower Extremity Swelling Comparison (%) 35.94 39.14     Skin condition is:  Dry, red, severe Fibrosis    Compression: Comperm B bilaterally - bandages in place today     Medication for infection:  Not currently      Treatment Today     TREATMENT MINUTES COMMENTS   Evaluation     Self-care/ Home management     Manual therapy 53    Manual therapy: manual lymph drainage for bilateral lower extremities lymphedema  Deep abdominal breath, bilateral axilla, bilateral inguinal,abdomen, anterior inguinal to axilla anastomosis on right side and left side, left lower extremity evacuation, right lower extremity evacuation, abdomen effleurage.    Pt will get wound care after session and they will re-wrap her today    Neuromuscular Re-education     Therapeutic Activity     Therapeutic  Exercises     Gait training     Modality__________________                Total 53     Blank areas are intentional and mean the treatment did not include these items.       Kavitha Perez PT, DPT, CLT-ARTHUR  6/4/2021

## 2021-06-26 NOTE — PROGRESS NOTES
Progress Notes by Bee Olguin PT at 6/16/2021 11:00 AM     Author: Bee Olguin PT Service: -- Author Type: Physical Therapist    Filed: 8/3/2021  5:11 PM Encounter Date: 6/16/2021 Status: Addendum    : Bee Olguin PT (Physical Therapist)    Related Notes: Original Note by Bee Olguin PT (Physical Therapist) filed at 6/17/2021  2:26 PM       August 3, 2021      Patient: Elizabeth Kelley   MR Number: [id number]   YOB: 1945   Date of Visit: 6/16/2021       Dear Dr. Demond Walters:    As you may recall, we have been seeing Elizabeth Kelley for Physical Therapy of leg swelling with wounds.    For therapy to continue, Medicare and/or Medicaid requires periodic physician review of the treatment plan. Please review the attached summary of the patient's progress and our plan for continued therapy, and verify  that you agree therapy should continue by signing this document and sending it back to our office.    Plan of Care:   Authorization / Certification Start Date: 06/16/21  Authorization / Certification End Date: 09/14/21  Authorization / Certification Number of Visits: 8  Communication with: Referral Source  Patient Related Instruction: Nature of Condition;Treatment plan and rationale;Self Care instruction;Basis of treatment;Precautions;Next steps;Expected outcome  Times per Week: 2-3  Number of Weeks: 4-6  Number of Visits: 12  Discharge Planning: when goals are met or pt I with home program  Precautions / Restrictions :   Therapeutic Exercise: ROM;Stretching;Strengthening;Lymphedema  Neuromuscular Reeducation: core;TNE  Manual Therapy: soft tissue mobilization;myofascial release;joint mobilization;lymphatic drainage massage  Functional Training (ADL's): skin care;self care;lymphedema precautions;bandage/garment wear and care;meal prep;safety procedures;instructions for equipment;ADL's;compensatory training;instructions in transfers;ergonomics  Equipment: compression  bandages    Goals    Patient will have a decreased volume in : right;LE;by 5%;to decrease risk of infection;for better fit of clothing;for improved body image;for ease of movement;in 12 weeks - IMPROVING    Patient will have decreased fibrosis, scar tissue for improved lymphatic mobilitiy : in 12 weeks - IMPROVING - softening on B medial thighs and L lower leg    Patient will perform, verbalize self-management of: skin care;self-monitoring;exercise;massage;self-compression;compression wear;compression care;infection prevention;in 12 weeks - IMPROVING - performing wound dressing changes and compression wraps    Pt will: demo decreased wound size by 50% for improvement of skin quality and decreased risk for infections in 12 weeks.- IMPROVING - pt reports bleeding (good thing), skin growing and wound size shrinking      If you have any questions or concerns, please don't hesitate to call.    Sincerely,      Bee Olguin, PT, DPT, OCS, CLT      For Medicare/MA patients:      Physician recommendation:     ___ Follow therapist's recommendation        ___ Modify therapy      I certify the need for these services furnished within this plan and while under my care.    Referring Provider's  Printed Name:   _____________________________________________    Referring Provider's signature:  __________________________________________________  Date/time:    ________________        After signing this form, please return to the fax number.  Thank you.       [facility] Daily Progress     Patient Name: Elizabeth Kelley  Date: 6/16/21  Visit #: 13  PTA visit #:  9  Referral Diagnosis: Lymphedema  Referring provider: Francisco Ramirez MD  Visit Diagnosis:     ICD-10-CM    1. Lymphedema  I89.0    2. Venous stasis ulcer of other part of right lower leg limited to breakdown of skin with varicose veins (H)  I83.018     L97.811    3. Localized swelling of both lower legs  R22.43          Assessment:   Patient  tolerated session well - had to go to the bathroom right afterwards. She states the wound is looking better and she has been able to decrease amount of wound dressings.  Patient tolerated MLD well. No weeping noted into the tubular compression, Patient remains appropriate for skilled therapy.   Pt demo's reduction in B LE volumes.    Patient is benefitting from skilled physical therapy and is making steady progress toward functional goals.  Patient is appropriate to continue with skilled physical therapy intervention, as indicated by initial plan of care.    Goal Status:    Patient will have a decreased volume in : right;LE;by 5%;to decrease risk of infection;for better fit of clothing;for improved body image;for ease of movement;in 12 weeks - IMPROVING    Patient will have decreased fibrosis, scar tissue for improved lymphatic mobilitiy : in 12 weeks - IMPROVING - softening on B medial thighs and L lower leg    Patient will perform, verbalize self-management of: skin care;self-monitoring;exercise;massage;self-compression;compression wear;compression care;infection prevention;in 12 weeks - IMPROVING - performing wound dressing changes and compression wraps    Pt will: demo decreased wound size by 50% for improvement of skin quality and decreased risk for infections in 12 weeks.- IMPROVING - pt reports bleeding (good thing), skin growing and wound size shrinking      Plan / Patient Education:     Continue with initial plan of care.  Progress with home program as tolerated.    Pt to continue with HEP and compression bandaging as needed.  Continue with MLD and modify compression as needed as wounds heal.  Progress exercises to increase hip, knee and ankle mobility and strength as able.    Subjective:     Pt states Kimmy Chaidez will hopefully come to her appointment at the vascular center next Tuesday. Kimmy will instruct Elizabeth on the use of the pump.    Pt had wound care at the vascular center last Friday.  Pt states they have decreased the amount of wound care wraps that are used.      Pt reports dropping to 32 hours per week at work to try to get more rest of legs as wounds continue to heal.  Pain Rating: 3/10 currently - pt reports pain, itching and burning is now intermittent instead of it used to be constant      Objective:   Caregiver present: NO    Observation of swelling: softening medial thigh B and smaller at R ankle    Volume measurements taken:YES   Right Lower Extremity Total Estimated Volume (cm3): 92064.17  Left Lower Extremity Total Estimated Volume (cm3): 8205.75  Right LE change of volume from initial (%): -7.18  Left LE change of volume from initial (%): -1.97  Lower Extremity Swelling Comparison (%): 28.72 %    Lymphedema Assessment 4/21/2021 5/19/2021 6/16/2021   Right Lower Extremity Total Estimated Volume (cm3) 59564.78 19026.76 81027.17   Right LE change of volume from last visit (%) - 0.8 -7.91   Right LE change of volume from initial (%) - 0.8 -7.18   Left Lower Extremity Total Estimated Volume (cm3) 8370.65 8243.03 8205.75   Left LE change of volume from last visit (%) - -1.52 -0.45   Left LE change of volume from initial (%) - -1.52 -1.97   Swelling Description Right>Left Right>Left Right>Left   Lower Extremity Swelling Comparison (%) 35.94 39.14 28.72     Skin condition is:  Dry, red, severe Fibrosis    Compression: Comperm B bilaterally -  Wound bandages in place today     Medication for infection:  Not currently      Treatment Today     TREATMENT MINUTES COMMENTS   Evaluation     Self-care/ Home management     Manual therapy 54 Remeasured B LE volumes and discussed progress so far.  Manual therapy: manual lymph drainage for bilateral lower extremities lymphedema  Deep abdominal breath, bilateral axilla, bilateral inguinal,abdomen, anterior inguinal to axilla anastomosis on right side and left side, left lower extremity evacuation, right lower extremity evacuation, abdomen  amaya.    Pt will get wound care after session and they will re-wrap her today    Neuromuscular Re-education     Therapeutic Activity     Therapeutic Exercises     Gait training     Modality__________________                Total 54    Blank areas are intentional and mean the treatment did not include these items.       Bee Olguin PT, DPT, OCS, CLT  6/17/2021

## 2021-06-26 NOTE — PROGRESS NOTES
[facility] Rehabilitation Daily Progress     Patient Name: Elizabeth Kelley  Date: 6/7/2021  Visit #: 10  PTA visit #:  7  Referral Diagnosis: Lymphedema  Referring provider: Francisco Ramirez MD  Visit Diagnosis:     ICD-10-CM    1. Lymphedema  I89.0    2. Venous stasis ulcer of other part of right lower leg limited to breakdown of skin with varicose veins (H)  I83.018     L97.811    3. Localized swelling of both lower legs  R22.43          Assessment:   Patient tolerated session well. She states the wound is looking better, bleeding some today but not weeping. She has appointment with wound care after this session where they will redress and wrap legs.   Patient tolerated MLD well. Some tenderness to the lower leg and avoided over wound area. No weeping noted into the tubular compression, Patient remains appropriate for skilled therapy.     Patient is benefitting from skilled physical therapy and is making steady progress toward functional goals.  Patient is appropriate to continue with skilled physical therapy intervention, as indicated by initial plan of care.    Goal Status:    Patient will have a decreased volume in : right;LE;by 5%;to decrease risk of infection;for better fit of clothing;for improved body image;for ease of movement;in 12 weeks - IMPROVING    Patient will have decreased fibrosis, scar tissue for improved lymphatic mobilitiy : in 12 weeks - IMPROVING - softening on B medial thighs and L lower leg    Patient will perform, verbalize self-management of: skin care;self-monitoring;exercise;massage;self-compression;compression wear;compression care;infection prevention;in 12 weeks - IMPROVING - performing wound dressing changes and compression wraps    Pt will: demo decreased wound size by 50% for improvement of skin quality and decreased risk for infections in 12 weeks.- ongoing      Plan / Patient Education:     Continue with initial plan of care.  Progress with home program as tolerated.     Pt to continue with HEP, wound dressing changes and compression bandaging as needed  Continue with MLD and modify compression as needed as wounds heal.    Subjective:     Pt reports her legs are achy today. Pt feels that the hot weather is affecting how she feels.   Pt states her R foot and ankle itch. Pt reports she put some cream on her foot.   Pain Ratin/10 currently      Objective:   Caregiver present: NO    Observation of swelling: unchanged .     Volume measurements taken:  No  Right Lower Extremity Total Estimated Volume (cm3): 43585.76  Left Lower Extremity Total Estimated Volume (cm3): 8243.03  Right LE change of volume from initial (%): 0.8  Left LE change of volume from initial (%): -1.52  Lower Extremity Swelling Comparison (%): 39.14 %    Lymphedema Assessment 2021   Right Lower Extremity Total Estimated Volume (cm3) 69435.78 79121.76   Right LE change of volume from last visit (%) - 0.8   Right LE change of volume from initial (%) - 0.8   Left Lower Extremity Total Estimated Volume (cm3) 8370.65 8243.03   Left LE change of volume from last visit (%) - -1.52   Left LE change of volume from initial (%) - -1.52   Swelling Description Right>Left Right>Left   Lower Extremity Swelling Comparison (%) 35.94 39.14     Skin condition is:  Dry, red, severe Fibrosis    Compression: Comperm B bilaterally - bandages in place today     Medication for infection:  Not currently      Treatment Today     TREATMENT MINUTES COMMENTS   Evaluation     Self-care/ Home management     Manual therapy 53    Manual therapy: manual lymph drainage for bilateral lower extremities lymphedema  Deep abdominal breath, bilateral axilla, bilateral inguinal,abdomen, anterior inguinal to axilla anastomosis on right side and left side, left lower extremity evacuation, right lower extremity evacuation, abdomen effleurage.    Pt will get wound care after session and they will re-wrap her today    Neuromuscular Re-education      Therapeutic Activity     Therapeutic Exercises     Gait training     Modality__________________                Total 53     Blank areas are intentional and mean the treatment did not include these items.       Pooja Diallo PTA,CLT  6/7/2021

## 2021-06-30 NOTE — PROGRESS NOTES
"Progress Notes by Maria G Yeboah CNP at 4/30/2021 11:00 AM     Author: Maria G Yeboah CNP Service: -- Author Type: Nurse Practitioner    Filed: 4/30/2021 11:59 AM Encounter Date: 4/30/2021 Status: Signed    : Maria G Yeboah CNP (Nurse Practitioner)               [facility]     FOLLOW UP NOTE      Date of Service: 4/30/2021    Date Last Seen: 4/28/2021; 4/28/2021    Chief Complaint: Burning pain of Right Lower Extremity associated with previously present ulceration.     Pt returns to [facility] for urgent evaluation of the above listed concern.  The patient arrives from home and is tearful. Patient woke up at 2 o'clock this morning in 10 out of 10 pain described as constant burning with intermittent sharp \"explosions\". She removed compression in an attempt to alleviate pain without improvement. She removed the Sorbact with normal saline and some new skin at the top of the ulcer came off too. She replaced dressings with bacitracin ointment and baby diapers. She continues to have 10 out of 10 pain and is tearful in the exam room. She took 1 aleve and 2 500 acetaminophen (Tylenol) with minimal improvement. She denies fever, chills or constitutional changes.      Allergies: Other environmental allergy, Adhesive, and Vicodin [hydrocodone-acetaminophen]      Medications:   Current Outpatient Medications:   ?  aspirin 81 mg chewable tablet, Chew 81 mg daily., Disp: , Rfl:   ?  buPROPion (WELLBUTRIN SR) 150 MG 12 hr tablet, , Disp: , Rfl:   ?  buPROPion (WELLBUTRIN XL) 150 MG 24 hr tablet, Take 150 mg by mouth daily., Disp: , Rfl:   ?  cholecalciferol, vitamin D3, 1,000 unit tablet, Take 2,000 Units by mouth daily., Disp: , Rfl:   ?  furosemide (LASIX) 20 MG tablet, , Disp: , Rfl:   ?  multivitamin therapeutic (THERAGRAN) tablet, Take 1 tablet by mouth daily., Disp: , Rfl:   ?  pantoprazole (PROTONIX) 40 MG tablet, Take 40 mg by mouth daily., " Disp: , Rfl:   ?  potassium chloride SA (K-DUR,KLOR-CON) 20 MEQ tablet, , Disp: , Rfl:   ?  rOPINIRole (REQUIP) 1 MG tablet, Take 1 mg by mouth 2 (two) times a day., Disp: , Rfl:   ?  simvastatin (ZOCOR) 40 MG tablet, Take 40 mg by mouth bedtime., Disp: , Rfl:   ?  spironolactone (ALDACTONE) 25 MG tablet, Take 25 mg by mouth daily., Disp: , Rfl:   ?  amoxicillin-clavulanate (AUGMENTIN) 875-125 mg per tablet, Take 1 tablet by mouth 2 (two) times a day for 7 days., Disp: 14 tablet, Rfl: 0  ?  ketorolac (TORADOL) 10 mg tablet, Take 1 tablet (10 mg total) by mouth every 6 (six) hours as needed for pain., Disp: 20 tablet, Rfl: 0    Current Facility-Administered Medications:   ?  lidocaine 2 % jelly (XYLOCAINE), , Topical, PRN, Rima Melendez MD, Given at 03/09/16 0928  ?  lidocaine 2 % jelly (XYLOCAINE), , Topical, PRN, Maria G Yeboah CNP, 1 application at 04/28/21 1328      History:   Past Medical History:   Diagnosis Date   ? Ankle contracture, left    ? Class 3 severe obesity due to excess calories without serious comorbidity with body mass index (BMI) of 45.0 to 49.9 in adult (H)    ? Combined gastric and duodenal ulcer    ? Depression    ? Dyslipidemia    ? Edema    ? Gait abnormality    ? GERD (gastroesophageal reflux disease)    ? Lymphedema of both lower extremities    ? Melanoma (H)     left upper arm   ? MS (multiple sclerosis) (H)    ? RLS (restless legs syndrome)    ? Skin ulcer of left lower leg (H)    ? Valgus deformity of both feet    ? Vitamin D deficiency        Physical Exam:    /64   Pulse 81   Temp 98.3  F (36.8  C)   SpO2 99%     General:  Patient presents to clinic, she is tearful.  Head: normocephalic atraumatic  Psychiatric:  Alert and oriented x3.   Respiratory: unlabored breathing; no cough  Integumentary:  Skin is uniformly warm, dry and pink.      Circumferential volume measures:  Vasc Edema 1/13/2016 3/15/2016 3/30/2016 9/21/2016 4/28/2021   Right just above MTP  25.1 - 23.8 24.5 23.6   Right Ankle 27.3 - 27 26.5 24.5   Right Widest Calf 58.1 - 55.5 54.0 62.2   Right Thigh Up 10cm 70.8 - 75.8 - 77.4   Left - just above MTP 23.2 22.7 23.5 23.5 22.5   Left Ankle 26.2 24.7 27.3 27.0 24.5   Left Widest Calf 49.9 40.3 56.4 54.0 56.6   Left Thigh Up 10cm 68.4 - 75 - 76.8       Ulceration(s)/Wound(s):   Wound/Ulcer location: RIGHT LOWER LEG   Size: See below. Complete circumferential involvement of lower leg from ankle to pretibial levels.    Edges: Attached  Undermining: none  Base: red, friable with new patches of epithelium  Tissues: dermis  Thickness: partial  Exudate Type: serosanguineous  Exudate Amount: copious  Michelle-Wound/Ulcer: warm around the ankle region  Odor: none    VASC Wound 04/28/21 Left inner thigh wound (Active)   Pre Size Length 0.8 04/28/21 1300   Pre Size Width 3 04/28/21 1300   Pre Size Depth 0.2 04/28/21 1300   Pre Total Sq cm 2.4 04/28/21 1300       VASC Wound 04/28/21 RIght lower leg (Active)   Pre Size Length 19 04/28/21 1300   Pre Size Width 28 04/28/21 1300   Pre Size Depth 0.1 04/28/21 1300   Pre Total Sq cm 532 04/28/21 1300          Laboratory/Diagnostics studes:  4/28/2021: G&S preliminary results show no polymorphonuclear leukocytes or organisms seen.       Diagnoses:  1. Skin ulcer of right lower leg, limited to breakdown of skin (H)  ketorolac (TORADOL) 10 mg tablet    amoxicillin-clavulanate (AUGMENTIN) 875-125 mg per tablet   2. Cellulitis of other specified site  ketorolac (TORADOL) 10 mg tablet    amoxicillin-clavulanate (AUGMENTIN) 875-125 mg per tablet   3. Pain associated with wound  ketorolac (TORADOL) 10 mg tablet    amoxicillin-clavulanate (AUGMENTIN) 875-125 mg per tablet   4. Venous insufficiency of both lower extremities     5. Acquired lymphedema of leg         Photo:  4/30/2021  Right Lower Extremity        Are any of these wounds new today: No.      Assessment/Plan:  1. Debridement: NA     2. Treatment: wound treatment will  include irrigation and dressings to promote autolytic debridement which will include: application of 2% topical lidocaine, Unna boot in burn fold pattern, ABD pads, rolled gauze and size J/K tubular compression. Leave dressing in place until Wednesday follow up appointment. Patient can remove compression and replace absorbant pads only.     3. Edema: see above, minimal compression due to pain.     4. Offloading: NA    5. Nutrition: NA    6. Diagnostics: NA    7. Medications: Amoxicillin/clavulanic acid (Augmentin) for presumed Right Lower Extremity cellulitis due to increased pain and redness with warmth along the ankle region/distal ulcer. Counseling to take a probiotic, not at the same time, given. Ketorolac (Toradol ) 10mg every 6 hours for max of 5 days for pain. Renal function checked with no clearance issues.     8. Education: See above. Education provided regarding above plan of care. Patient verbalizes understanding and all questions answered to her satisfaction.        Impression:  Elizabeth Kelley is a 75 year old patient with extensive ulceration of the entire right lower leg complicated by recurrent infection, acquired lymphedema following lymphectomies, and venous insufficiency s/p Right Lower Extremity interventions. She has a history of Left Lower Extremity ulcers which have healed. She also has an altered gait secondary to bilateral valgus deformity, left ankle contracture and bilateral flat feel in conjunction with multiple sclerosis. She continues to be ambulatory without assistive device. The patient is starting lymphedema therapy to redirect lymph fluid back into the circulatory system.     Today, she presents with new onset increased pain with associated redness at the distal margin of the ulceration. Of note, she reports her exudate is significantly less and her dressings actually became adherent to her ulcer traumatically removing some new epithelium. Pain is likely, in part, due to desiccation  of tissue as the ulcer base dried out; however the is redness and warmth along the ankle/distal margin of the ulcer concerning for infection. I will start her on a 7 day course of antibiotic as well. She has been treated with repeated courses of cefalexin (Keflex) in the pass. I will treat with something broad-spectrum. The patient is visible more comfortable and reports improved comfort by the end of the appointment with changes in dressings application. She will follow up with me on Wednesday for close evaluation until a plan of care that is tolerable to her is achieved.       Patient will follow up with me in 5 days for reevaluation. They were instructed to call the clinic sooner with any signs or symptoms of infection or any further questions/concerns. Answered all questions.      Maria G Yeboah, MSN, CNP, CWOCN-AP  [facility]  [phone]

## 2021-06-30 NOTE — PROGRESS NOTES
Progress Notes by Maria G Yeboah CNP at 5/13/2021  2:20 PM     Author: Maria G Yeboah CNP Service: -- Author Type: Nurse Practitioner    Filed: 5/13/2021  3:07 PM Encounter Date: 5/13/2021 Status: Signed    : Maria G Yeboah CNP (Nurse Practitioner)               [facility]ASC[facility]     FOLLOW UP NOTE      Date of Service: 5/13/2021    Chief Complaint: Right Lower Extremity ulcer    Pt returns to [facility] for evaluation of the above listed concern. She has completed culture directed doxycycline (Monodox, Vibramycin) and levofloxacin (Levaquin); denies nausea, vomiting or diarrhea. Dressings of Xeroform to proximal margin, viscose paste/Unna boot in burn fold pattern, ABDs and size J/K tubular compression is changed twice weekly at the clinic. Patient reports significantly less drainage.     Today, she denies fever, chills or constitutional changes. Pain is rated at 2 out of 10. She reports this is well controlled with acetaminophen (Tylenol). She has been able to sleep for several nights without pain.  She has started lymphedema therapy twice weekly. Manual lymphatic drainage is being done. No discussion has been had yet about long term compression including use of a pneumatic pump; however I anticipate these discussions will start soon.       Allergies: Other environmental allergy, Adhesive, and Vicodin [hydrocodone-acetaminophen]      Medications:   Current Outpatient Medications:   ?  aspirin 81 mg chewable tablet, Chew 81 mg daily., Disp: , Rfl:   ?  buPROPion (WELLBUTRIN SR) 150 MG 12 hr tablet, , Disp: , Rfl:   ?  buPROPion (WELLBUTRIN XL) 150 MG 24 hr tablet, Take 150 mg by mouth daily., Disp: , Rfl:   ?  cholecalciferol, vitamin D3, 1,000 unit tablet, Take 2,000 Units by mouth daily., Disp: , Rfl:   ?  furosemide (LASIX) 20 MG tablet, , Disp: , Rfl:   ?  multivitamin therapeutic (THERAGRAN) tablet, Take 1 tablet by mouth daily., Disp:  , Rfl:   ?  pantoprazole (PROTONIX) 40 MG tablet, Take 40 mg by mouth daily., Disp: , Rfl:   ?  potassium chloride SA (K-DUR,KLOR-CON) 20 MEQ tablet, , Disp: , Rfl:   ?  rOPINIRole (REQUIP) 1 MG tablet, Take 1 mg by mouth 2 (two) times a day., Disp: , Rfl:   ?  simvastatin (ZOCOR) 40 MG tablet, Take 40 mg by mouth bedtime., Disp: , Rfl:   ?  spironolactone (ALDACTONE) 25 MG tablet, Take 25 mg by mouth daily., Disp: , Rfl:     Current Facility-Administered Medications:   ?  lidocaine 2 % jelly (XYLOCAINE), , Topical, PRN, Rima Melendez MD, Given at 03/09/16 0928  ?  lidocaine 2 % jelly (XYLOCAINE), , Topical, PRN, Maria G Yeboah CNP, 1 application at 04/28/21 1328      History:   Past Medical History:   Diagnosis Date   ? Ankle contracture, left    ? Class 3 severe obesity due to excess calories without serious comorbidity with body mass index (BMI) of 45.0 to 49.9 in adult (H)    ? Combined gastric and duodenal ulcer    ? Depression    ? Dyslipidemia    ? Edema    ? Gait abnormality    ? GERD (gastroesophageal reflux disease)    ? Lymphedema of both lower extremities    ? Melanoma (H)     left upper arm   ? MS (multiple sclerosis) (H)    ? RLS (restless legs syndrome)    ? Skin ulcer of left lower leg (H)    ? Valgus deformity of both feet    ? Vitamin D deficiency        Physical Exam:    /66 (Patient Site: Left Arm, Patient Position: Sitting, Cuff Size: Adult Large)   Temp 97.3  F (36.3  C)   Resp 18     General:  Patient presents to clinic, she is tearful.  Head: normocephalic atraumatic  Psychiatric:  Alert and oriented x3.   Respiratory: unlabored breathing; no cough  Integumentary:  Skin is uniformly warm, dry and pink.      Circumferential volume measures:  Vasc Edema 3/30/2016 9/21/2016 4/28/2021 5/10/2021 5/13/2021   Right just above MTP 23.8 24.5 23.6 23.8 22.0   Right Ankle 27 26.5 24.5 25.0 24.4   Right Widest Calf 55.5 54.0 62.2 61.4 57.8   Right Thigh Up 10cm 75.8 - 77.4 - -    Left - just above MTP 23.5 23.5 22.5 22.8 21.8   Left Ankle 27.3 27.0 24.5 25.6 24.4   Left Widest Calf 56.4 54.0 56.6 54.8 52.5   Left Thigh Up 10cm 75 - 76.8 - -       Ulceration(s)/Wound(s):   Wound/Ulcer location: RIGHT LOWER LEG   Size: See below.   Edges: Attached and incorporating.  Undermining: none  Base: pink with coalescing area of yellow fibrin over the mid shin; there are epithelial islands throughout the ulcer base.  Tissues: dermis  Thickness: partial  Exudate Type: serous  Exudate Amount: large  Michelle-Wound/Ulcer: No erythema.   Odor: none    VASC Wound 04/28/21 Left inner thigh wound (Active)   Pre Size Length 0.4 05/13/21 1400   Pre Size Width 1.2 05/13/21 1400   Pre Size Depth 0.1 05/13/21 1400   Pre Total Sq cm 0.48 05/13/21 1400   Description SCAB 05/07/21 1400       VASC Wound 04/28/21 RIght lower leg (Active)   Pre Size Length 16 05/13/21 1400   Pre Size Width 28 05/13/21 1400   Pre Size Depth 0.1 05/13/21 1400   Pre Total Sq cm 448 05/13/21 1400   Description red 05/07/21 1400        Laboratory/Diagnostics studes:  None to review.       Diagnoses:  1. Skin ulcer of right lower leg, limited to breakdown of skin (H)     2. Acquired lymphedema of leg     3. Lipedema         Photo:  5/13/2021  Right Lower Extremity    Bilateral Lower Extremities        Are any of these wounds new today: No.      Assessment/Plan:  1. Debridement: NA     2. Treatment: wound treatment will include irrigation and dressings to promote autolytic debridement which will include: Xeroform to the proximal margin to prevent dressings from pulling at the hyperkeratotic tissue; Unna boot in burn fold pattern; ABD pads, rolled gauze; and size J/K tubular compression. Change twice weekly in the clinic. Patient can change the ABD pads in between appointments if needed, leaving the Unna boot/viscose paste gauze in place.     3. Edema: Patient completing lymphedema therapy twice weekly. Has not been able to advance to short  stretch compression from size J/K tubular compression.     4. Offloading: NA    5. Nutrition: NA    6. Diagnostics: NA    7. Medications:  NA    8. Education: See above. Education provided regarding consideration of long-term short term compression possibly in conjunction with pneumatic pumps. Patient is interested in these modalities.  Patient verbalizes understanding and all questions answered to her satisfaction.        Impression:  Elizabeth Kelley is a 75 year old patient with extensive ulceration of the entire right lower leg complicated by recurrent infection, acquired lymphedema following lymphectomies, and venous insufficiency s/p Right Lower Extremity interventions. She has a history of Left Lower Extremity ulcers which have healed. She also has an altered gait secondary to bilateral valgus deformity, left ankle contracture and bilateral flat feel in conjunction with multiple sclerosis. She continues to be ambulatory without assistive device. The patient has started lymphedema therapy to redirect lymph fluid back into the circulatory system; the patient reports significantly lessened drainage and pain. She has completed culture directed antibiotics without GI distress.     Today, she presents with improvement of the Right Lower Extremity ulcer, limited to skin breakdown. There continues to be epithelial migration into the ulcer base. Signs and symptoms of infection have resolved with culture directed antibiotics. She is tolerating lymphedema therapy. I discussed furtur consideration of types of short stretch wraps in conjunction with pneumatic compression pump. She is interested in using both modalities. I will follow up with her in two weeks after further discussion/trials with lymphedema therapist.       Patient will follow up with me in 10-14 days for reevaluation. They were instructed to call the clinic sooner with any signs or symptoms of infection or any further questions/concerns. Answered all  questions.      Maria G Yeboah, MSN, CNP, CWOCN-AP  [facility]  [phone]

## 2021-06-30 NOTE — PROGRESS NOTES
"Progress Notes by Kelsy Navarro RN at 5/17/2021 10:00 AM     Author: Kelsy Navarro RN Service: -- Author Type: Registered Nurse    Filed: 5/17/2021 10:50 AM Encounter Date: 5/17/2021 Status: Signed    : Kelsy Navarro RN (Registered Nurse)           Right leg 5/17        Bilateral legs 5/13    Nurse Visit    Chief Complaint: Patient presents to clinic for assessment and treatment of their bilateral lower extremity swelling and weeping/excoriated areas right leg    Dressing on Arrival: Xeroform, viscopaste, ABD's and maxi pads from home, roll gauze, tubular compression.    Nursing Note: Patient came in for rewrap of right leg. Rates pain \"4\" out of 10 and states she is feeling \"pretty good\" today. Has lymphedema therapy at [facility] times weekly and she feels this is really helping. Has been changing the outer absorbant part of dressing twice daily. She reports there has been a few times she has only needed to change is once daily. Applied xeroform to proximal edge of dressing to prevent sticking, viscopaste in fan fold technique, ABD's and roll gauze. Size G tubular compression applied and she adds size F tubular compression at home to foot area when she does not have her shoes on. Tolerated dressing change well but does have increased discomfort with cleansing of the right leg. Used blotting technique instead of wiping which is more tolerable for [place].       Allergies:   Allergies   Allergen Reactions   ? Other Environmental Allergy Anaphylaxis     Bees/Wasps Stings   ? Adhesive      Other reaction(s): sores   ? Vicodin [Hydrocodone-Acetaminophen] Nausea And Vomiting and Rash       Medications:   Current Outpatient Medications:   ?  aspirin 81 mg chewable tablet, Chew 81 mg daily., Disp: , Rfl:   ?  buPROPion (WELLBUTRIN SR) 150 MG 12 hr tablet, , Disp: , Rfl:   ?  buPROPion (WELLBUTRIN XL) 150 MG 24 hr tablet, Take 150 mg by mouth daily., Disp: , Rfl:   ?  cholecalciferol, vitamin D3, 1,000 unit " tablet, Take 2,000 Units by mouth daily., Disp: , Rfl:   ?  furosemide (LASIX) 20 MG tablet, , Disp: , Rfl:   ?  multivitamin therapeutic (THERAGRAN) tablet, Take 1 tablet by mouth daily., Disp: , Rfl:   ?  pantoprazole (PROTONIX) 40 MG tablet, Take 40 mg by mouth daily., Disp: , Rfl:   ?  potassium chloride SA (K-DUR,KLOR-CON) 20 MEQ tablet, , Disp: , Rfl:   ?  rOPINIRole (REQUIP) 1 MG tablet, Take 1 mg by mouth 2 (two) times a day., Disp: , Rfl:   ?  simvastatin (ZOCOR) 40 MG tablet, Take 40 mg by mouth bedtime., Disp: , Rfl:   ?  spironolactone (ALDACTONE) 25 MG tablet, Take 25 mg by mouth daily., Disp: , Rfl:     Current Facility-Administered Medications:   ?  lidocaine 2 % jelly (XYLOCAINE), , Topical, PRN, Rima Melendez MD, Given at 03/09/16 0928  ?  lidocaine 2 % jelly (XYLOCAINE), , Topical, PRN, Maria G Yeboah CNP, 1 application at 04/28/21 1328    Vital Signs: /78 (Patient Site: Left Arm, Patient Position: Sitting, Cuff Size: Adult Large)   Pulse 78   Temp 97.8  F (36.6  C) (Temporal)   Resp 18       Assessment:    General:  Patient presents to clinic in no apparent distress.  Psychiatric:  Alert and oriented .   Lower extremity:  edema is present.    Integumentary:  Skin is uniformly warm, dry and pink.    Wound size:   VASC Wound 04/28/21 Left inner thigh wound (Active)   Pre Size Length 0.4 05/13/21 1400   Pre Size Width 1.2 05/13/21 1400   Pre Size Depth 0.1 05/13/21 1400   Pre Total Sq cm 0.48 05/13/21 1400   Description SCAB 05/07/21 1400       VASC Wound 04/28/21 RIght lower leg (Active)   Pre Size Length 16 05/17/21 1000   Pre Size Width 28 05/17/21 1000   Pre Size Depth 0.1 05/17/21 1000   Pre Total Sq cm 448 05/17/21 1000   Description red 05/07/21 1400      Undermining is not present.    The periwoundskin is pink    Vasc Edema 9/21/2016 4/28/2021 5/10/2021 5/13/2021 5/17/2021   Right just above MTP 24.5 23.6 23.8 22.0 24.6   Right Ankle 26.5 24.5 25.0 24.4 24.6    Right Widest Calf 54.0 62.2 61.4 57.8 60.6   Right Thigh Up 10cm - 77.4 - - -   Left - just above MTP 23.5 22.5 22.8 21.8 23.6   Left Ankle 27.0 24.5 25.6 24.4 24.2   Left Widest Calf 54.0 56.6 54.8 52.5 53.2   Left Thigh Up 10cm - 76.8 - - -       Plan:         1. Patient will follow up on Wednesday for nurse visit/dressing change         2. Treatment provided will include irrigation and dressings to promote autolytic debridement and will be as listed below     Cleansed with: Normal Saline    Protected skin with: Remedy Skin Repair-to intact skin/feet    Dressings Applied: xeroform to proximal edge right leg, viscopaste in fan fold techniquie, ABD's and roll gauze    Compression Applied to the Left Leg: Dermafit      Tubular compression was applied from base of toes to just below the bend of knee    Compression Applied to the Right Leg: Dermafit    Tubular compression was applied from base of toes to just below the bend of knee    Offloading applied: None  Trial Products: no  Provider notified regarding concerns: no  Treatment Changes: no    Educational Barriers: none  Taught Regarding: Activity, Compliance, Compression, Dressing and Pain control  Teaching Method: Explanation and Handout

## 2021-06-30 NOTE — PROGRESS NOTES
Progress Notes by Maria G Yeboah CNP at 4/28/2021  1:00 PM     Author: Maria G Yeboah CNP Service: -- Author Type: Nurse Practitioner    Filed: 4/28/2021  5:12 PM Encounter Date: 4/28/2021 Status: Signed    : Maria G Yeboah CNP (Nurse Practitioner)             [facility] Consult Note    Date of Service: 4/28/2021     Requesting Provider: Dr. Demond Walters    Chief Complaint: Right Lower Extremity ulcer    History of Present Illness: Elizabeth Kelley is being seen at [facility] today regarding Right Lower Extremity ulcer. The patient arrives to the clinic today from home by herself. The patient's medical history is significant for melanoma s/p Mohs of the Left upper extremity; multiple sclerosis, gout, and s/p bilateral lymphectomies in conjunction with removal of excess skin of the abdomen and thighs due to intentional weight loss around 1988.     The patient reports onset of Right Lower Extremity ulcer in October 2020 after she injured the skin shaving. The patient initially treating with an antimicrobial cream without improvement. In November 2020, she was treated with cefalexin (Keflex) and prednisone with almost complete resolution. The ulceration then worsened and she needed several more courses of antibiotics. she eventually was referred to [facility] and underwent RFA of right great and anterior accessory great saphenousvein with sclerotherapy of varicosities on 4/12/2021. She has been referred and started Lymphedema therapy. She will have post-procedure follow up in 3 months.     Also, she developed an ulcer over the left inner thigh attributed to rubbing on compression wrapping. She has been applying bacitracin and moisturizing cream to the area several times a day and reports it is improving.     The patient reports pain localized to the area of concern rated 4 out of 10 localized to the Right Lower Extremity. Pain is described as burning and  tender without improvement. She is unable to tolerate compression due to the pain. She does, incidentally, apply Coban to the Left Lower Extremity. She indicates that it is applied up to the knee, however, due to the shape of her lower leg, this slides down to mid-calf.      Today, the patient denies any fevers, chills, generalized ill feeling or other acute medical concerns. Sleeps in a bed. Denies history of DVT and joint replacement. Positive history of vein procedures.      I personally reviewed outside imaging, lab work, and progress noted through Care Everywhere and outside records .      Pertinent Medications:    Vitamin D3    Lasix    Spironolactone     MVM    Triamterene-hydrochlorothiazide (Maxzide)       Review of Systems:   Constitutional:  negative  for fever, chills or night sweats  EENTM: negative for glasses;  negative [place]  GI:  negative for nausea/vomiting; constipation; diarrhea; fecal incontinence or weight loss  :  negative dysuria, negative incontinence  MS: patient is ambulatory;  does not use assistive devices; negative history of falls  Cardiac:  negative chest pain or palpitations  Respiratory:  negative shortness of breath or cough  Endocrine:  negative diabetes or thyroid disorder  Vascular: negative swelling, denies numbness  Psych:  negative acute depression/anxiety      Past Medical History:    Past Medical History:   Diagnosis Date   ? Ankle contracture, left    ? Class 3 severe obesity due to excess calories without serious comorbidity with body mass index (BMI) of 45.0 to 49.9 in adult (H)    ? Combined gastric and duodenal ulcer    ? Depression    ? Dyslipidemia    ? Edema    ? Gait abnormality    ? GERD (gastroesophageal reflux disease)    ? Lymphedema of both lower extremities    ? Melanoma (H)     left upper arm   ? MS (multiple sclerosis) (H)    ? RLS (restless legs syndrome)    ? Skin ulcer of left lower leg (H)    ? Valgus deformity of both feet    ? Vitamin D deficiency          Surgical History:   Past Surgical History:   Procedure Laterality Date   ? ABLATION SAPHENOUS VEIN W/ RFA Right 04/12/2021    Saphenous vein, anterior accessory saphenous vein, sclerotherapy of multiple veins.   ? COSMETIC SURGERY  1988    reduction of excess skin from weight loss   ? ESOPHAGOGASTRODUODENOSCOPY N/A 03/30/2015    Procedure: UPPER ENDOSCOPY with gastric biopsy;  Surgeon: Checo Fletcher MD;  Location: [facility];  Service:    ? MOHS SURGERY      left upper arm, melanoma   ? REDUCTION MAMMAPLASTY Bilateral    ? SPINE SURGERY      L5 area surgery, decompression        Medications:      Current Outpatient Medications:   ?  aspirin 81 mg chewable tablet, Chew 81 mg daily., Disp: , Rfl:   ?  buPROPion (WELLBUTRIN SR) 150 MG 12 hr tablet, , Disp: , Rfl:   ?  furosemide (LASIX) 20 MG tablet, , Disp: , Rfl:   ?  pantoprazole (PROTONIX) 40 MG tablet, Take 40 mg by mouth daily., Disp: , Rfl:   ?  potassium chloride SA (K-DUR,KLOR-CON) 20 MEQ tablet, , Disp: , Rfl:   ?  rOPINIRole (REQUIP) 1 MG tablet, Take 1 mg by mouth 2 (two) times a day., Disp: , Rfl:   ?  simvastatin (ZOCOR) 40 MG tablet, Take 40 mg by mouth bedtime., Disp: , Rfl:   ?  spironolactone (ALDACTONE) 25 MG tablet, Take 25 mg by mouth daily., Disp: , Rfl:   ?  buPROPion (WELLBUTRIN XL) 150 MG 24 hr tablet, Take 150 mg by mouth daily., Disp: , Rfl:   ?  cholecalciferol, vitamin D3, 1,000 unit tablet, Take 2,000 Units by mouth daily., Disp: , Rfl:   ?  multivitamin therapeutic (THERAGRAN) tablet, Take 1 tablet by mouth daily., Disp: , Rfl:     Current Facility-Administered Medications:   ?  lidocaine 2 % jelly (XYLOCAINE), , Topical, PRN, Rima Melendez MD, Given at 03/09/16 0928  ?  lidocaine 2 % jelly (XYLOCAINE), , Topical, PRN, Maria G Yeboah CNP, 1 application at 04/28/21 1328      Allergies:   Allergies   Allergen Reactions   ? Other Environmental Allergy Anaphylaxis     Bees/Wasps Stings   ? Vicodin  [Hydrocodone-Acetaminophen] Nausea And Vomiting and Rash       Family history:   Family History   Problem Relation Age of Onset   ? Uterine cancer Mother    ? Hyperlipidemia Mother    ? Hypertension Mother    ? Multiple sclerosis Mother    ? Asthma Sister    ? Obesity Sister    ? Hyperlipidemia Sister    ? Hypertension Sister    ? Multiple sclerosis Sister    ? Arthritis Sister    ? Colon cancer Paternal Aunt    ? Breast cancer Paternal Aunt    ? Hypertension Paternal Aunt    ? Hyperlipidemia Paternal Aunt    ? Brain cancer Father    ? Arthritis Maternal Uncle    ? Arthritis Maternal Grandmother    ? Early death Maternal Grandfather    ? Arthritis Paternal Grandmother    ? Arthritis Paternal Grandfather        Social History:   Social History     Socioeconomic History   ? Marital status: Single     Spouse name: Not on file   ? Number of children: Not on file   ? Years of education: Not on file   ? Highest education level: Not on file   Occupational History   ? Not on file   Social Needs   ? Financial resource strain: Not on file   ? Food insecurity     Worry: Not on file     Inability: Not on file   ? Transportation needs     Medical: Not on file     Non-medical: Not on file   Tobacco Use   ? Smoking status: Former Smoker     Packs/day: 0.25     Years: 12.00     Pack years: 3.00     Types: Cigarettes     Quit date: 1975     Years since quittin.3   ? Smokeless tobacco: Never Used   ? Tobacco comment: quit more than 30 years ago   Substance and Sexual Activity   ? Alcohol use: Yes     Alcohol/week: 1.0 standard drinks     Types: 1 Glasses of wine per week   ? Drug use: No   ? Sexual activity: Yes     Partners: Male     Birth control/protection: Post-menopausal   Lifestyle   ? Physical activity     Days per week: Not on file     Minutes per session: Not on file   ? Stress: Not on file   Relationships   ? Social connections     Talks on phone: Not on file     Gets together: Not on file     Attends Orthodox  "service: Not on file     Active member of club or organization: Not on file     Attends meetings of clubs or organizations: Not on file     Relationship status: Not on file   ? Intimate partner violence     Fear of current or ex partner: Not on file     Emotionally abused: Not on file     Physically abused: Not on file     Forced sexual activity: Not on file   Other Topics Concern   ? Not on file   Social History Narrative    .   Has significant other.  2 grown children.  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         Physical Exam  Vitals: Blood pressure 122/60, pulse 78, temperature 98.2  F (36.8  C), resp. rate 18, height 5' 1\" (1.549 m), weight (!) 252 lb (114.3 kg), not currently breastfeeding.  General: This is a 75 y.o. female who appears their reported age, not in acute distress  Head: normocephalic, Atraumatic; not wearing glasses; non-icteric sclera; no exudate; mild hearing loss   Respiratory: Clear throughout all lung fields; unlabored breathing; no cough   Cardiac: Regular, Rate and Rhythm, no murmurs appreciated   Skin: Uniformly warm and dry  Psych: Alert and oriented x4; calm and cooperative throughout exam  Abdomen: Normal bowel sounds. Soft, symmetric, no guarding or rigidity, nontender with palpation.  No organomegaly or masses palpated.   Lymphatics: No palpable nodes to bilateral groin, difficult to palpate due to body habitus.  Peripheral Vascular: Bilateral dorsalis pedis, posterior tibial pulses 2/4 to manual palpation. Positive for lipodermatosclerosis with champagne bottle shaped legs. Positive for hemosiderin staining extending up to the proximal left lower leg and severe hemosiderosis with desquamation of the Right Lower Lower leg terminating at the ankle level. There is no edema of the bilateral ankles or feet.     Circumferential volume measures:  Vasc Edema 1/13/2016 3/15/2016 3/30/2016 9/21/2016 4/28/2021   Right just above MTP 25.1 - 23.8 24.5 23.6   Right Ankle 27.3 - 27 " 26.5 24.5   Right Widest Calf 58.1 - 55.5 54.0 62.2   Right Thigh Up 10cm 70.8 - 75.8 - 77.4   Left - just above MTP 23.2 22.7 23.5 23.5 22.5   Left Ankle 26.2 24.7 27.3 27.0 24.5   Left Widest Calf 49.9 40.3 56.4 54.0 56.6   Left Thigh Up 10cm 68.4 - 75 - 76.8       Ulceration(s)/Wound(s):   Wound/Ulcer location: RIGHT LOWER LEG   Size: See below. Complete circumferential involvement of lower leg from ankle to pretibial levels.    Edges: Attached  Undermining: none  Base: red, friable  Tissues: dermis  Thickness: partial  Exudate Type: serosanguineous  Exudate Amount: copious  Michelle-Wound/Ulcer: hyperemia without warmth  Odor: none    Wound/Ulcer location: LEFT MEDIAL THIGH   Size: See below.   Edges: Attached  Undermining: none  Base: adherent yellow slough  Tissues: unknown  Thickness: unknown  Exudate Type: yellow  Exudate Amount: small  Michelle-Wound/Ulcer: no erythema  Odor: none     VASC Wound 04/28/21 Left inner thigh wound (Active)   Pre Size Length 0.8 04/28/21 1300   Pre Size Width 3 04/28/21 1300   Pre Size Depth 0.2 04/28/21 1300   Pre Total Sq cm 2.4 04/28/21 1300       VASC Wound 04/28/21 RIght lower leg (Active)   Pre Size Length 19 04/28/21 1300   Pre Size Width 28 04/28/21 1300   Pre Size Depth 0.1 04/28/21 1300   Pre Total Sq cm 532 04/28/21 1300       Lab/Diagnostics:  Component      Latest Ref Rng & Units 8/30/2017 9/26/2020   Sodium      136 - 145 mmol/L  141   Potassium      3.5 - 5.0 mmol/L  3.9   Chloride      98 - 107 mmol/L  103   CO2      22 - 31 mmol/L  30   Anion Gap, Calculation      5 - 18 mmol/L  8   Glucose      70 - 125 mg/dL  92   BUN      8 - 28 mg/dL  24   Creatinine      0.60 - 1.10 mg/dL  0.79   GFR MDRD Af Amer      >60 mL/min/1.73m2  >60   GFR MDRD Non Af Amer      >60 mL/min/1.73m2  >60   Bilirubin, Total      0.0 - 1.0 mg/dL  0.4   Calcium      8.5 - 10.5 mg/dL  9.4   Protein, Total      6.0 - 8.0 g/dL  7.2   ALBUMIN      3.5 - 5.0 g/dL  3.9   Alkaline Phosphatase      45 -  120 U/L  83   AST      0 - 40 U/L  16   ALT      0 - 45 U/L  22   TSH      0.30 - 5.00 uIU/mL 2.00        Lab Results   Component Value Date    SEDRATE 15 05/13/2016     Lab Results   Component Value Date    CREATININE 0.79 09/26/2020     Lab Results   Component Value Date    HGBA1C 5.9 06/23/2016     Lab Results   Component Value Date    BUN 24 09/26/2020     Lab Results   Component Value Date    ALBUMIN 3.9 09/26/2020     Vitamin D, Total (25-Hydroxy)   Date Value Ref Range Status   06/23/2016 62.2 30.0 - 80.0 ng/mL Final       Diagnoses:  1. Skin ulcer of right lower leg, limited to breakdown of skin (H)  lidocaine 2 % jelly (XYLOCAINE)    Change dressing    Culture/Gram Stain: Wound   2. Chronic ulcer of left thigh limited to breakdown of skin (H)  Change dressing   3. Venous insufficiency of both lower extremities  lidocaine 2 % jelly (XYLOCAINE)   4. Acquired lymphedema of leg  lidocaine 2 % jelly (XYLOCAINE)   5. Lipedema     6. Vitamin D deficiency  lidocaine 2 % jelly (XYLOCAINE)        Photo:  4/28/2021  Bilateral Lower Extremities     Right Lower Extremity     Left Medial Thigh        Assessment/Plan:  Debridement: NA    1. Treatment: wound treatment will include irrigation and dressings to promote autolytic debridement which will include: Primary dressing of Sorbact Swab with secondary dressing of super-absorbant pads. The primary dressing will provide permeable antimicrobial therapy and can be changed daily. The secondary dressings will provide better control of exudate to keep caustic, lymph fluid off the skin. The patient can change this 1-2 x daily until compression helps reduce exudate amount.    2. Edema: Patient has started lymphedema therapy. She cannot tolerate much compression but will start with short stretch wraps with tubular compression to help hold compression and dressing in place.     3. Offloading: NA    4. Nutrition: Discussed micronutrients important to healing.    5. Diagnostics:  Culture to assess for critical colonization. She has been treated with multiple rounds of oral antibiotics. I would avoid this if possible.     6. Medications: NA    7. Education: See above. Education provided regarding function and purpose of dressings, compression, as well as ordered labs and diagnostics. Patient verbalizes understanding and all questions answered to her satisfaction.       Impression:  Elizabeth Kelley is a 75 year old patient with extensive ulceration of the entire right lower leg complicated by recurrent infection, acquired lymphedema following lymphectomies, and venous insufficiency s/p Right Lower Extremity interventions. She has a history of Left Lower Extremity ulcers which have healed. She also has an altered gait secondary to bilateral valgus deformity, left ankle contracture and bilateral flat feel in conjunction with multiple sclerosis. She continues to be ambulatory without assistive device.     The patient is starting lymphedema therapy to redirect lymph fluid back into the circulatory system. Dressings will assist with autolytic and mechanical debridement, antimicrobial prophylaxis, reduction of inflammation, and easier removal of exudate away from injured skin.     Patient to return to clinic in 2 week(s) for re-evaluation. They were instructed to call the clinic sooner with any further questions or concerns.       Maria G Yeboah, MSN, CNP, CWOCN-AP  [facility]  [phone]        The assessment, plan of care, and note were electronically prepared by Maira G Yeboah CNP, CWOCN-AP.

## 2021-06-30 NOTE — PROGRESS NOTES
"Progress Notes by Kelsy Navarro RN at 5/19/2021  9:00 AM     Author: Kelsy Navarro RN Service: -- Author Type: Registered Nurse    Filed: 5/19/2021 10:04 AM Encounter Date: 5/19/2021 Status: Signed    : Kelsy Navarro RN (Registered Nurse)           Bilateral lower legs 5/19        Right leg 5/19    Nurse Visit    Chief Complaint: Patient presents to clinic for assessment and treatment of their bilateral leg swelling and red, weepy right leg     Dressing on Arrival: xeroform to proximal edge of dressing, viscopaste, ABD, maxi pads for absorption, roll gauze and tubular compression size G    Nursing Note: Carolina came in today prior to lymphedema therapy appointment for routine dressing change to right leg. Carolina states her pain is about an \"8\" out of 10 today and she was unable to sleep last night due to her leg discomfort. She said she felt better on Monday, but began \"feeling bad\" yesterday. Afebrile. Leg has increased erythema in comparison to Monday at nurse visit and there is notable warmth to the right lower leg. Carolina is tearful throughout appointment and said she feels like something \"is going on with her\". Let Carolina know that I will update Maria G Yeboah CNP on whether or not she would like to restart antibiotic or has any other recommendations. Carolina has been off antibiotic for approximately one week.     Allergies:   Allergies   Allergen Reactions   ? Other Environmental Allergy Anaphylaxis     Bees/Wasps Stings   ? Adhesive      Other reaction(s): sores   ? Vicodin [Hydrocodone-Acetaminophen] Nausea And Vomiting and Rash       Medications:   Current Outpatient Medications:   ?  aspirin 81 mg chewable tablet, Chew 81 mg daily., Disp: , Rfl:   ?  buPROPion (WELLBUTRIN SR) 150 MG 12 hr tablet, , Disp: , Rfl:   ?  buPROPion (WELLBUTRIN XL) 150 MG 24 hr tablet, Take 150 mg by mouth daily., Disp: , Rfl:   ?  cholecalciferol, vitamin D3, 1,000 unit tablet, Take 2,000 Units by mouth daily., Disp: , Rfl:   ?  " furosemide (LASIX) 20 MG tablet, , Disp: , Rfl:   ?  multivitamin therapeutic (THERAGRAN) tablet, Take 1 tablet by mouth daily., Disp: , Rfl:   ?  pantoprazole (PROTONIX) 40 MG tablet, Take 40 mg by mouth daily., Disp: , Rfl:   ?  potassium chloride SA (K-DUR,KLOR-CON) 20 MEQ tablet, , Disp: , Rfl:   ?  rOPINIRole (REQUIP) 1 MG tablet, Take 1 mg by mouth 2 (two) times a day., Disp: , Rfl:   ?  simvastatin (ZOCOR) 40 MG tablet, Take 40 mg by mouth bedtime., Disp: , Rfl:   ?  spironolactone (ALDACTONE) 25 MG tablet, Take 25 mg by mouth daily., Disp: , Rfl:     Current Facility-Administered Medications:   ?  lidocaine 2 % jelly (XYLOCAINE), , Topical, PRN, Rima Melendez MD, Given at 03/09/16 0928  ?  lidocaine 2 % jelly (XYLOCAINE), , Topical, PRN, Maria G Yeboah CNP, 1 application at 04/28/21 1328    Vital Signs: /78 (Patient Site: Left Arm, Patient Position: Semi-andres, Cuff Size: Adult Large)   Pulse 80   Temp 97.8  F (36.6  C) (Temporal)   Resp 18       Assessment:    General:  Patient presents to clinic in no apparent distress.  Psychiatric:  Alert and oriented .   Lower extremity:  edema is present.    Integumentary:  Skin is uniformly warm, dry and pink.    Wound size:   VASC Wound 04/28/21 Left inner thigh wound (Active)   Pre Size Length 0.4 05/13/21 1400   Pre Size Width 1.2 05/13/21 1400   Pre Size Depth 0.1 05/13/21 1400   Pre Total Sq cm 0.48 05/13/21 1400   Description SCAB 05/07/21 1400       VASC Wound 04/28/21 RIght lower leg (Active)   Pre Size Length 16 05/19/21 0900   Pre Size Width 28 05/19/21 0900   Pre Size Depth 0.1 05/19/21 0900   Pre Total Sq cm 448 05/19/21 0900   Description red 05/07/21 1400      Undermining is not present.    The periwoundskin is red/excoriated     Vasc Edema 9/21/2016 4/28/2021 5/10/2021 5/13/2021 5/17/2021   Right just above MTP 24.5 23.6 23.8 22.0 24.6   Right Ankle 26.5 24.5 25.0 24.4 24.6   Right Widest Calf 54.0 62.2 61.4 57.8 60.6    Right Thigh Up 10cm - 77.4 - - -   Left - just above MTP 23.5 22.5 22.8 21.8 23.6   Left Ankle 27.0 24.5 25.6 24.4 24.2   Left Widest Calf 54.0 56.6 54.8 52.5 53.2   Left Thigh Up 10cm - 76.8 - - -       Plan:         1. Patient will follow up on Tuesday of next week.         2. Treatment provided will include irrigation and dressings to promote autolytic debridement and will be as listed below     Cleansed with: bottled water    Protected skin with: none    Dressings Applied: xeroform to proximal edge of dressing to prevent sticking, viscopaste fan folded, ABD's (8), roll gauze.    Compression Applied to the Left Leg: Dermafit      Tubular compression was applied from base of toes to just below the bend of knee    Compression Applied to the Right Leg: Dermafit    Tubular compression was applied from base of toes to just below the bend of knee    Offloading applied: None  Trial Products: no  Provider notified regarding concerns: yes-sent message to Maria G Yeboah CNP to inform of increased redness, pain and warmth to right leg.  Treatment Changes: no    Educational Barriers: none  Taught Regarding: Activity, Compliance, Compression, Dressing, Hygiene, Infection, Medication and Pain control  Teaching Method: Explanation and Handout

## 2021-06-30 NOTE — PROGRESS NOTES
Progress Notes by Maria G Yeboah CNP at 5/5/2021  9:40 AM     Author: Maria G Yeboah CNP Service: -- Author Type: Nurse Practitioner    Filed: 5/5/2021 10:36 AM Encounter Date: 5/5/2021 Status: Signed    : Maria G Yeboah CNP (Nurse Practitioner)               [facility] VASCULAR [facility]     FOLLOW UP NOTE      Date of Service: 5/5/2021    Chief Complaint: Right Lower Extremity ulcer    Pt returns to [facility] for evaluation of the above listed concern.  The patient was last seen urgently due to increased pain. She had removed the Sorbact with normal saline and some new skin at the top of the ulcer came off too. Culture was obtained; dressings of an application of 2% topical lidocaine, Unna boot in burn fold pattern, ABD pads, rolled gauze and size J/K tubular compression were removed by the patient over the weekend. She resumed applying ABD pads and Coban. She was started on Amoxicillin/clavulanic acid (Augmentin).    Today, she denies fever, chills or constitutional changes. Pain is rated at 8 out of 10. Patient tearful. PO ketorolac (Toradol) was ordered for 5 days max., renal function normal when prescribed. She reports she has taken this irregularly and has 7 tablets left; provider reviewed need to take mediation on a schedule to gain and keep control of pain.      Allergies: Other environmental allergy, Adhesive, and Vicodin [hydrocodone-acetaminophen]      Medications:   Current Outpatient Medications:   ?  amoxicillin-clavulanate (AUGMENTIN) 875-125 mg per tablet, Take 1 tablet by mouth 2 (two) times a day for 7 days., Disp: 14 tablet, Rfl: 0  ?  aspirin 81 mg chewable tablet, Chew 81 mg daily., Disp: , Rfl:   ?  buPROPion (WELLBUTRIN SR) 150 MG 12 hr tablet, , Disp: , Rfl:   ?  buPROPion (WELLBUTRIN XL) 150 MG 24 hr tablet, Take 150 mg by mouth daily., Disp: , Rfl:   ?  cholecalciferol, vitamin D3, 1,000 unit tablet, Take 2,000 Units by mouth  daily., Disp: , Rfl:   ?  furosemide (LASIX) 20 MG tablet, , Disp: , Rfl:   ?  ketorolac (TORADOL) 10 mg tablet, Take 1 tablet (10 mg total) by mouth every 6 (six) hours as needed for pain., Disp: 20 tablet, Rfl: 0  ?  multivitamin therapeutic (THERAGRAN) tablet, Take 1 tablet by mouth daily., Disp: , Rfl:   ?  pantoprazole (PROTONIX) 40 MG tablet, Take 40 mg by mouth daily., Disp: , Rfl:   ?  potassium chloride SA (K-DUR,KLOR-CON) 20 MEQ tablet, , Disp: , Rfl:   ?  rOPINIRole (REQUIP) 1 MG tablet, Take 1 mg by mouth 2 (two) times a day., Disp: , Rfl:   ?  simvastatin (ZOCOR) 40 MG tablet, Take 40 mg by mouth bedtime., Disp: , Rfl:   ?  spironolactone (ALDACTONE) 25 MG tablet, Take 25 mg by mouth daily., Disp: , Rfl:     Current Facility-Administered Medications:   ?  lidocaine 2 % jelly (XYLOCAINE), , Topical, PRN, Rima Melendez MD, Given at 03/09/16 0928  ?  lidocaine 2 % jelly (XYLOCAINE), , Topical, PRN, Maria G Yeboah CNP, 1 application at 04/28/21 1328      History:   Past Medical History:   Diagnosis Date   ? Ankle contracture, left    ? Class 3 severe obesity due to excess calories without serious comorbidity with body mass index (BMI) of 45.0 to 49.9 in adult (H)    ? Combined gastric and duodenal ulcer    ? Depression    ? Dyslipidemia    ? Edema    ? Gait abnormality    ? GERD (gastroesophageal reflux disease)    ? Lymphedema of both lower extremities    ? Melanoma (H)     left upper arm   ? MS (multiple sclerosis) (H)    ? RLS (restless legs syndrome)    ? Skin ulcer of left lower leg (H)    ? Valgus deformity of both feet    ? Vitamin D deficiency        Physical Exam:    /76   Pulse 84   Temp 98.9  F (37.2  C)     General:  Patient presents to clinic, she is tearful.  Head: normocephalic atraumatic  Psychiatric:  Alert and oriented x3.   Respiratory: unlabored breathing; no cough  Integumentary:  Skin is uniformly warm, dry and pink.      Circumferential volume  measures:  Vasc Edema 1/13/2016 3/15/2016 3/30/2016 9/21/2016 4/28/2021   Right just above MTP 25.1 - 23.8 24.5 23.6   Right Ankle 27.3 - 27 26.5 24.5   Right Widest Calf 58.1 - 55.5 54.0 62.2   Right Thigh Up 10cm 70.8 - 75.8 - 77.4   Left - just above MTP 23.2 22.7 23.5 23.5 22.5   Left Ankle 26.2 24.7 27.3 27.0 24.5   Left Widest Calf 49.9 40.3 56.4 54.0 56.6   Left Thigh Up 10cm 68.4 - 75 - 76.8       Ulceration(s)/Wound(s):   Wound/Ulcer location: RIGHT LOWER LEG   Size: See below.   Edges: Attached and incorporating.  Undermining: none  Base: pink with yellow fibrin; significant amount of new epithelium over the posterior calf with migration cephalic from the distal margin.   Tissues: dermis  Thickness: partial  Exudate Type: serous  Exudate Amount: copious  Michelle-Wound/Ulcer: lifting hyperkeratosis along the proximal margin.   Odor: none    VASC Wound 04/28/21 Left inner thigh wound (Active)   Pre Size Length 0.8 04/28/21 1300   Pre Size Width 3 04/28/21 1300   Pre Size Depth 0.2 04/28/21 1300   Pre Total Sq cm 2.4 04/28/21 1300       VASC Wound 04/28/21 RIght lower leg (Active)   Pre Size Length 19 04/28/21 1300   Pre Size Width 28 04/28/21 1300   Pre Size Depth 0.1 04/28/21 1300   Pre Total Sq cm 532 04/28/21 1300        Laboratory/Diagnostics studes:    4/28/2021    4+ MRSA Coagulase Positive StaphAbnormal     Methicillin resistant Staph aureus, patient may be an isolation risk.   1+ Proteus mirabilisAbnormal           Source: Right Lower Extremity ulcer     Susceptibility   MRSA Coagulase Positive Staph     MARYBEL     Cefazolin Resistant     Clindamycin Sensitive     Daptomycin Sensitive     Doxycycline Sensitive     Linezolid Sensitive     Oxacillin Resistant     Trimethoprim + Sulfamethoxazole Sensitive     Vancomycin Sensitive           Susceptibility   Proteus mirabilis     MARYBEL     Amikacin Sensitive     Ampicillin Resistant     Ampicillin + Sulbactam Intermediate     Cefazolin Intermediate     Cefepime  Sensitive     Ceftriaxone Sensitive     Ciprofloxacin Sensitive     Gentamicin Resistant     Levofloxacin Sensitive     Piperacillin + Tazobactam Sensitive     Tetracycline Resistant     Tobramycin Resistant     Trimethoprim + Sulfamethoxazole Resistant          Diagnoses:  No diagnosis found.      Photo:  5/5/2021  Right Lower Extremity        Are any of these wounds new today: No.      Assessment/Plan:  1. Debridement: NA     2. Treatment: wound treatment will include irrigation and dressings to promote autolytic debridement which will include: Xeroform to the proximal margin to prevent dressings from pulling at the hyperkeratotic tissue; Unna boot in burn fold pattern; ABD pads, rolled gauze; Comprilan short stretch; and size J/K tubular compression. Leave dressing in place until Friday nurse appointment.     3. Edema: see above, minimal compression due to pain. Patient will start lymphedema therapy 5/5/2021.     4. Offloading: NA    5. Nutrition: NA    6. Diagnostics: See culture & sensativity results above.     7. Medications:  Stop Amoxicillin/clavulanic acid (Augmentin); start culture directed doxycycline (Monodox, Vibramycin) and levofloxacin (Levaquin).  Counseling to take a probiotic, not at the same time, reviewed. Avoid dairy with doxycycline (Monodox, Vibramycin). Reviewed taking pain medication on a schedule to MAINTAIN pain control.     8. Education: See above. Education provided regarding above plan of care. Patient verbalizes understanding and all questions answered to her satisfaction.        Impression:  Elizabeth Kelley is a 75 year old patient with extensive ulceration of the entire right lower leg complicated by recurrent infection, acquired lymphedema following lymphectomies, and venous insufficiency s/p Right Lower Extremity interventions. She has a history of Left Lower Extremity ulcers which have healed. She also has an altered gait secondary to bilateral valgus deformity, left ankle  contracture and bilateral flat feel in conjunction with multiple sclerosis. She continues to be ambulatory without assistive device. The patient is starting lymphedema therapy to redirect lymph fluid back into the circulatory system and be the catalyst to healing, reduction of edema and improved pain relief.     Today, she presents with improvement of the Right Lower Extremity ulcer. The redness is lessened, partially due to some degree of coverage from the Amoxicillin/clavulanic acid (Augmentin). There is new epithelial migration over a majority of the right posterior calf and distal portion of the ulcer. The patient describes adherence of dressings to the proximal margin. I will apply Xeroform to prevent adhesion and continue with Unna boot to ulcer and above listed directions. Antibiotics are adjusted for sensitivities; I anticipate this will greatly ease her pain.         Patient will follow up with me in 5 days for reevaluation. They were instructed to call the clinic sooner with any signs or symptoms of infection or any further questions/concerns. Answered all questions.      Maria G Yeboah, MSN, CNP, CWOCN-AP  [facility]  [phone]

## 2021-06-30 NOTE — PROGRESS NOTES
"Progress Notes by Kelsy Navarro RN at 5/10/2021  2:00 PM     Author: Kelsy Navarro RN Service: -- Author Type: Registered Nurse    Filed: 5/10/2021  3:16 PM Encounter Date: 5/10/2021 Status: Signed    : Kelsy Navarro RN (Registered Nurse)           Bilateral legs 5/10        Right leg 5/10    Nurse Visit    Chief Complaint: Patient presents to clinic for assessment and treatment of their bilateral lower extremity swelling R>L and right leg wound with weeping    Dressing on Arrival: xeroform, viscopaste, roll gauze, maxi pads (Carolina had run out of diapers and will pick some up after appointment today.    Patient came in today for rewrap of right leg. She arrived with dressing in place that she reports she has to change 2-3 times per day because of large amounts of drainage. She had ran out of diapers for absorbency so had several maxi pads on. Carolina states that her pain is \"better\". Rates pain about \"4\" out of 10 today. Was tearful while viscopaste being removed stating that it is painful for her. After this was removed she tolerated dressing change well.   Allergies:   Allergies   Allergen Reactions   ? Other Environmental Allergy Anaphylaxis     Bees/Wasps Stings   ? Adhesive      Other reaction(s): sores   ? Vicodin [Hydrocodone-Acetaminophen] Nausea And Vomiting and Rash       Medications:   Current Outpatient Medications:   ?  aspirin 81 mg chewable tablet, Chew 81 mg daily., Disp: , Rfl:   ?  buPROPion (WELLBUTRIN SR) 150 MG 12 hr tablet, , Disp: , Rfl:   ?  buPROPion (WELLBUTRIN XL) 150 MG 24 hr tablet, Take 150 mg by mouth daily., Disp: , Rfl:   ?  cholecalciferol, vitamin D3, 1,000 unit tablet, Take 2,000 Units by mouth daily., Disp: , Rfl:   ?  doxycycline (VIBRAMYCIN) 100 MG capsule, Take 1 capsule (100 mg total) by mouth 2 (two) times a day for 7 days., Disp: 14 capsule, Rfl: 0  ?  furosemide (LASIX) 20 MG tablet, , Disp: , Rfl:   ?  levoFLOXacin (LEVAQUIN) 500 MG tablet, Take 1 tablet (500 mg " total) by mouth daily for 7 days., Disp: 7 tablet, Rfl: 0  ?  multivitamin therapeutic (THERAGRAN) tablet, Take 1 tablet by mouth daily., Disp: , Rfl:   ?  pantoprazole (PROTONIX) 40 MG tablet, Take 40 mg by mouth daily., Disp: , Rfl:   ?  potassium chloride SA (K-DUR,KLOR-CON) 20 MEQ tablet, , Disp: , Rfl:   ?  rOPINIRole (REQUIP) 1 MG tablet, Take 1 mg by mouth 2 (two) times a day., Disp: , Rfl:   ?  simvastatin (ZOCOR) 40 MG tablet, Take 40 mg by mouth bedtime., Disp: , Rfl:   ?  spironolactone (ALDACTONE) 25 MG tablet, Take 25 mg by mouth daily., Disp: , Rfl:     Current Facility-Administered Medications:   ?  lidocaine 2 % jelly (XYLOCAINE), , Topical, PRN, Rima Melendez MD, Given at 03/09/16 0928  ?  lidocaine 2 % jelly (XYLOCAINE), , Topical, PRN, Maria G Yeboah CNP, 1 application at 04/28/21 1328    Vital Signs: /70 (Patient Site: Left Arm, Patient Position: Sitting, Cuff Size: Adult Large)   Pulse 78   Temp 98  F (36.7  C) (Temporal)   Resp 18       Assessment:    General:  Patient presents to clinic in no apparent distress.  Psychiatric:  Alert and oriented .   Lower extremity:  edema is present.    Integumentary:  Skin is uniformly warm, dry and pink.    Wound size:   VASC Wound 04/28/21 Left inner thigh wound (Active)   Pre Size Length 0.5 05/10/21 1400   Pre Size Width 2.3 05/10/21 1400   Pre Size Depth 0.1 05/10/21 1400   Pre Total Sq cm 1.15 05/10/21 1400   Description SCAB 05/07/21 1400       VASC Wound 04/28/21 RIght lower leg (Active)   Pre Size Length 19 05/10/21 1400   Pre Size Width 28 05/10/21 1400   Pre Size Depth 0.1 05/10/21 1400   Pre Total Sq cm 532 05/10/21 1400   Description red 05/07/21 1400      Undermining is not present.    The periwoundskin is excoriated/red     Vasc Edema 3/15/2016 3/30/2016 9/21/2016 4/28/2021 5/10/2021   Right just above MTP - 23.8 24.5 23.6 23.8   Right Ankle - 27 26.5 24.5 25.0   Right Widest Calf - 55.5 54.0 62.2 61.4   Right Thigh  "Up 10cm - 75.8 - 77.4 -   Left - just above MTP 22.7 23.5 23.5 22.5 22.8   Left Ankle 24.7 27.3 27.0 24.5 25.6   Left Widest Calf 40.3 56.4 54.0 56.6 54.8   Left Thigh Up 10cm - 75 - 76.8 -       Plan:         1. Patient will follow up on Thursday for appointment with Maria G Yeboah CNP         2. Treatment provided will include irrigation and dressings to promote autolytic debridement and will be as listed below     Cleansed with: Normal Saline    Protected skin with: Remedy Skin Repair (intact skin)    Dressings Applied: xeroform to proximal edge of weepy area. Viscopaste, ABD (8- 8\" x 10\"), roll gauze.    Compression Applied to the Left Leg: Dermafit-size F      Tubular compression was applied from base of toes to just below the bend of knee    Compression Applied to the Right Leg: Dermafit-size G    Tubular compression was applied from base of toes to just below the bend of knee    Offloading applied: None  Trial Products: no  Provider notified regarding concerns: no  Treatment Changes: no    Educational Barriers: none  Taught Regarding: Activity, Compliance, Compression, Disease, Dressing, Hygiene and Infection  Teaching Method: Explanation and Handout               "

## 2021-06-30 NOTE — PROGRESS NOTES
Progress Notes by Christina Wills RN at 5/7/2021  2:00 PM     Author: Christina Wills RN Service: -- Author Type: Registered Nurse    Filed: 5/7/2021  3:30 PM Encounter Date: 5/7/2021 Status: Signed    : Christina Wills RN (Registered Nurse)       Nurse Visit            Chief Complaint: Patient presents to clinic for assessment and treatment of their ulcer and and swelling    Dressing on Arrival: viscopaste only 1/4 of the way up right leg. White paper liners (the ones that are placed to line the kay trays) to top of patient leg. Nothing to left thigh. Right leg dressings completely wet throughout.    Patient came in today for dressing change and tubular compression per order from Maria G Yeboah CNP. Patient rated pain 5 out of 10. Removed dressings and cleansed skin with soap and cleaned wound bed with dilute hibiclens. Applied lotion to intact skin.  Applied viscopaste to lower leg, xeroform to upper leg to wound and covered with ABD. Rewrapped tubular compression wrap to right leg. Patient tolerated dressing change well.         Allergies:   Allergies   Allergen Reactions   ? Other Environmental Allergy Anaphylaxis     Bees/Wasps Stings   ? Adhesive      Other reaction(s): sores   ? Vicodin [Hydrocodone-Acetaminophen] Nausea And Vomiting and Rash       Medications:   Current Outpatient Medications:   ?  amoxicillin-clavulanate (AUGMENTIN) 875-125 mg per tablet, Take 1 tablet by mouth 2 (two) times a day for 7 days., Disp: 14 tablet, Rfl: 0  ?  aspirin 81 mg chewable tablet, Chew 81 mg daily., Disp: , Rfl:   ?  buPROPion (WELLBUTRIN SR) 150 MG 12 hr tablet, , Disp: , Rfl:   ?  buPROPion (WELLBUTRIN XL) 150 MG 24 hr tablet, Take 150 mg by mouth daily., Disp: , Rfl:   ?  cholecalciferol, vitamin D3, 1,000 unit tablet, Take 2,000 Units by mouth daily., Disp: , Rfl:   ?  doxycycline (VIBRAMYCIN) 100 MG capsule, Take 1 capsule (100 mg total) by mouth 2 (two) times a day for 7 days., Disp: 14 capsule, Rfl: 0  ?   furosemide (LASIX) 20 MG tablet, , Disp: , Rfl:   ?  levoFLOXacin (LEVAQUIN) 500 MG tablet, Take 1 tablet (500 mg total) by mouth daily for 7 days., Disp: 7 tablet, Rfl: 0  ?  multivitamin therapeutic (THERAGRAN) tablet, Take 1 tablet by mouth daily., Disp: , Rfl:   ?  pantoprazole (PROTONIX) 40 MG tablet, Take 40 mg by mouth daily., Disp: , Rfl:   ?  potassium chloride SA (K-DUR,KLOR-CON) 20 MEQ tablet, , Disp: , Rfl:   ?  rOPINIRole (REQUIP) 1 MG tablet, Take 1 mg by mouth 2 (two) times a day., Disp: , Rfl:   ?  simvastatin (ZOCOR) 40 MG tablet, Take 40 mg by mouth bedtime., Disp: , Rfl:   ?  spironolactone (ALDACTONE) 25 MG tablet, Take 25 mg by mouth daily., Disp: , Rfl:     Current Facility-Administered Medications:   ?  lidocaine 2 % jelly (XYLOCAINE), , Topical, PRN, Rima Melendez MD, Given at 03/09/16 0928  ?  lidocaine 2 % jelly (XYLOCAINE), , Topical, PRN, Maria G Yeboah CNP, 1 application at 04/28/21 1328    Vital Signs: /70   Pulse 78   Temp 97.7  F (36.5  C)   Resp 17       Assessment:    General:  Patient presents to clinic in no apparent distress.  Psychiatric:  Alert and oriented .   Lower extremity:  edema is present.    Integumentary:  Skin is uniformly warm, dry and pink.    Wound size:   VASC Wound 04/28/21 Left inner thigh wound (Active)   Pre Size Length 0.5 05/07/21 1400   Pre Size Width 2.3 05/07/21 1400   Pre Size Depth 0.1 05/07/21 1400   Pre Total Sq cm 2.4 04/28/21 1300   Description SCAB 05/07/21 1400       VASC Wound 04/28/21 RIght lower leg (Active)   Pre Size Length 19 05/07/21 1400   Pre Size Width 28 05/07/21 1400   Pre Size Depth 0.1 05/07/21 1400   Pre Total Sq cm 532 04/28/21 1300      Undermining is not present.    The periwoundskin is red        Plan:         1. Patient will follow up on 5/10         2. Treatment provided will include irrigation and dressings to promote autolytic debridement and will be as listed below     Cleansed with: Dilute  Hibiclens    Protected skin with: Remedy Skin Repair to bilateral feet    Dressings Applied: xeroform, viscopaste, and ABD pad    Compression Applied to the Left Leg: pt had tubular compression in place upon arrival        Compression Applied to the Right Leg: tubular compression    Tubular compression was applied from base of toes to just below the bend of knee    Offloading applied: None  Trial Products: no  Provider notified regarding concerns: no  Treatment Changes: no    Educational Barriers: none  Taught Regarding: Activity, Dressing, Hygiene and Infection  Teaching Method: Explanation and DC sheet

## 2021-07-04 NOTE — LETTER
Letter by Maria G Yeboah CNP at      Author: Maria G Yeboah CNP Service: -- Author Type: --    Filed:  Encounter Date: 6/18/2021 Status: (Other)         06/18/21          Elizabeth Kelley  [address]  [place] [address]    Dear Elizabeth,    As a valued [facility] patient, your healthcare needs are our priority. Our clinic records indicate we have attempted to contact you to re schedule your appointment with Maria G Yeboah , but we have not heard back from you. If you wish to schedule your appointment please contact our office to schedule your appointment at your soonest convenience. If you have already made an appointment, please disregard this letter.   We can be reached at: [phone]    Sincerely,    [facility]

## 2021-07-04 NOTE — PROGRESS NOTES
Progress Notes by Marie Connolly LPN at 6/7/2021  1:00 PM     Author: Marie Connolly LPN Service: -- Author Type: Licensed Nurse    Filed: 6/7/2021  1:50 PM Encounter Date: 6/7/2021 Status: Signed    : Marie Connolly LPN (Licensed Nurse)       Nurse Visit          Chief Complaint: Patient presents to clinic for assessment and treatment of their ulcer and and swelling    Dressing on Arrival: xeroform, viscopaste, diapers, roll gauze    Patient came in today for dressing change per order from Maria G Yeboah CNP. Patient rated pain 5 out of 10. Removed dressings and cleansed skin with soap and cleaned wound bed with NS.  Applied xeroform and viscopaste to wound and covered with four deconstructed diapers provided by patient. Reapplied tubular compression to right leg. Patient tolerated dressing change well.       Allergies:   Allergies   Allergen Reactions   ? Other Environmental Allergy Anaphylaxis     Bees/Wasps Stings   ? Adhesive      Other reaction(s): sores   ? Vicodin [Hydrocodone-Acetaminophen] Nausea And Vomiting and Rash       Medications:   Current Outpatient Medications:   ?  aspirin 81 mg chewable tablet, Chew 81 mg daily., Disp: , Rfl:   ?  buPROPion (WELLBUTRIN SR) 150 MG 12 hr tablet, , Disp: , Rfl:   ?  buPROPion (WELLBUTRIN XL) 150 MG 24 hr tablet, Take 150 mg by mouth daily., Disp: , Rfl:   ?  cholecalciferol, vitamin D3, 1,000 unit tablet, Take 2,000 Units by mouth daily., Disp: , Rfl:   ?  furosemide (LASIX) 20 MG tablet, , Disp: , Rfl:   ?  multivitamin therapeutic (THERAGRAN) tablet, Take 1 tablet by mouth daily., Disp: , Rfl:   ?  pantoprazole (PROTONIX) 40 MG tablet, Take 40 mg by mouth daily., Disp: , Rfl:   ?  potassium chloride SA (K-DUR,KLOR-CON) 20 MEQ tablet, , Disp: , Rfl:   ?  rOPINIRole (REQUIP) 1 MG tablet, Take 1 mg by mouth 2 (two) times a day., Disp: , Rfl:   ?  simvastatin (ZOCOR) 40 MG tablet, Take 40 mg by mouth bedtime., Disp: , Rfl:   ?  spironolactone  (ALDACTONE) 25 MG tablet, Take 25 mg by mouth daily., Disp: , Rfl:     Current Facility-Administered Medications:   ?  lidocaine 2 % jelly (XYLOCAINE), , Topical, PRN, Rima Melendez MD, Given at 03/09/16 0928  ?  lidocaine 2 % jelly (XYLOCAINE), , Topical, PRN, Maria G Yeboah, CNP, 1 application at 04/28/21 1328    Vital Signs: /76   Pulse 84   Temp 98.6  F (37  C)       Assessment:    General:  Patient presents to clinic in no apparent distress.  Psychiatric:  Alert and oriented .   Lower extremity:  edema is present.    Integumentary:  Skin is uniformly warm, dry and pink.    Wound size:   [facility] Wound 04/28/21 RIght lower leg (Active)   Pre Size Length 19 06/07/21 1200   Pre Size Width 28 06/07/21 1200   Pre Size Depth 0.1 06/07/21 1200   Pre Total Sq cm 532 06/07/21 1200   Description red, macerated 06/01/21 1300   Prodcut Used Viscopaste;Gauze 06/01/21 1300      Undermining is not present.    The periwoundskin is red, macerated       Plan:         1. Patient will follow up on 6/11/2021         2. Treatment provided will include irrigation and dressings to promote autolytic debridement and will be as listed below     Cleansed with: Normal Saline    Dressings Applied: viscopaste, diapers  and Oil emulsion    Compression Applied to the Left Leg: None      NA    Compression Applied to the Right Leg: tubular compression    Tubular compression was applied from base of toes to just below the bend of knee    Offloading applied: None  Trial Products: no  Provider notified regarding concerns: no  Treatment Changes: no    Educational Barriers: none  Taught Regarding: Activity, Compliance, Compression and Dressing  Teaching Method: Explanation and DC sheet

## 2021-07-04 NOTE — PROGRESS NOTES
Progress Notes by Maria G Yeboah CNP at 5/27/2021 11:20 AM     Author: Maria G Yeboah CNP Service: -- Author Type: Nurse Practitioner    Filed: 5/27/2021 12:53 PM Encounter Date: 5/27/2021 Status: Signed    : Maria G Yeboah CNP (Nurse Practitioner)               [facility]     FOLLOW UP NOTE      Date of Service: 5/27/2021    Chief Complaint: Right Lower Extremity ulcer    Pt returns to [facility] for evaluation of the above listed concern. She has completed culture directed doxycycline (Monodox, Vibramycin) and levofloxacin (Levaquin); denies nausea, vomiting or diarrhea. Dressings of Xeroform to proximal margin, viscose paste/Unna boot in burn fold pattern, ABDs and size G and J/K tubular compression is changed twice weekly at the clinic.  Patient changes absorbant layer applied over the viscose paste boot once to twice daily. She does not always replace the compression, often instead applied coban.     Patient had increased redness and pain and additional doxycycline (Monodox, Vibramycin) was prescribed. Patient noted to be tearful during 5/25/2021 RN appointment for changes of dressing. Patient arrives without foot/ankle portion of compression in place, siting her shoes don't fit. Patient rating pain at an 8; she reports she took extra shifts at work where she stands all day just prior to worsening swelling and pain. She reports she cannot afford to not work despite Medicare coverage and she cannot receive support from children.     Today, she denies fever, chills or constitutional changes. Pain is rated at 3 out of 10. She is going to lymphedema therapy 3x weekly.  Manual lymphatic drainage is being done. No discussion has been had yet about long term compression including use of a pneumatic pump; however I anticipate these discussions will start soon. She is interested in lymphedema pumps.     She has reduced her number of days  at work to 4 out of 7 and will try to split these days so she is not on her feet for extended days in a row.       Allergies: Other environmental allergy, Adhesive, and Vicodin [hydrocodone-acetaminophen]      Medications:   Current Outpatient Medications:   ?  aspirin 81 mg chewable tablet, Chew 81 mg daily., Disp: , Rfl:   ?  buPROPion (WELLBUTRIN SR) 150 MG 12 hr tablet, , Disp: , Rfl:   ?  buPROPion (WELLBUTRIN XL) 150 MG 24 hr tablet, Take 150 mg by mouth daily., Disp: , Rfl:   ?  cholecalciferol, vitamin D3, 1,000 unit tablet, Take 2,000 Units by mouth daily., Disp: , Rfl:   ?  doxycycline (MONODOX) 100 MG capsule, Take 1 capsule (100 mg total) by mouth 2 (two) times a day for 10 days., Disp: 20 capsule, Rfl: 0  ?  furosemide (LASIX) 20 MG tablet, , Disp: , Rfl:   ?  multivitamin therapeutic (THERAGRAN) tablet, Take 1 tablet by mouth daily., Disp: , Rfl:   ?  pantoprazole (PROTONIX) 40 MG tablet, Take 40 mg by mouth daily., Disp: , Rfl:   ?  potassium chloride SA (K-DUR,KLOR-CON) 20 MEQ tablet, , Disp: , Rfl:   ?  rOPINIRole (REQUIP) 1 MG tablet, Take 1 mg by mouth 2 (two) times a day., Disp: , Rfl:   ?  simvastatin (ZOCOR) 40 MG tablet, Take 40 mg by mouth bedtime., Disp: , Rfl:   ?  spironolactone (ALDACTONE) 25 MG tablet, Take 25 mg by mouth daily., Disp: , Rfl:     Current Facility-Administered Medications:   ?  lidocaine 2 % jelly (XYLOCAINE), , Topical, PRN, Rima Melendez MD, Given at 03/09/16 0928  ?  lidocaine 2 % jelly (XYLOCAINE), , Topical, PRN, Maria G Yeboah CNP, 1 application at 04/28/21 1328      History:   Past Medical History:   Diagnosis Date   ? Ankle contracture, left    ? Class 3 severe obesity due to excess calories without serious comorbidity with body mass index (BMI) of 45.0 to 49.9 in adult (H)    ? Combined gastric and duodenal ulcer    ? Depression    ? Dyslipidemia    ? Edema    ? Gait abnormality    ? GERD (gastroesophageal reflux disease)    ? Lymphedema of  both lower extremities    ? Melanoma (H)     left upper arm   ? MS (multiple sclerosis) (H)    ? RLS (restless legs syndrome)    ? Skin ulcer of left lower leg (H)    ? Valgus deformity of both feet    ? Vitamin D deficiency        Physical Exam:    Pulse 80   Temp 98.3  F (36.8  C)     General:  Patient presents to clinic, she is tearful.  Head: normocephalic atraumatic  Psychiatric:  Alert and oriented x3.   Respiratory: unlabored breathing; no cough  Integumentary:  Skin is uniformly warm, dry and pink.      Circumferential volume measures:  Vasc Edema 4/28/2021 5/10/2021 5/13/2021 5/17/2021 5/25/2021   Right just above MTP 23.6 23.8 22.0 24.6 25.5   Right Ankle 24.5 25.0 24.4 24.6 29   Right Widest Calf 62.2 61.4 57.8 60.6 62   Right Thigh Up 10cm 77.4 - - - 75.5   Left - just above MTP 22.5 22.8 21.8 23.6 23.5   Left Ankle 24.5 25.6 24.4 24.2 25.5   Left Widest Calf 56.6 54.8 52.5 53.2 43.5   Left Thigh Up 10cm 76.8 - - - -       Ulceration(s)/Wound(s):   Wound/Ulcer location: RIGHT LOWER LEG   Size: See below.   Edges: Attached and incorporating.  Undermining: none  Base: pink with coalescing area of yellow fibrin over the mid shin; there are epithelial islands throughout the ulcer base.  Tissues: dermis  Thickness: partial  Exudate Type: serous  Exudate Amount: large  Michelle-Wound/Ulcer: No erythema.   Odor: none    VASC Wound 04/28/21 RIght lower leg (Active)   Pre Size Length 16 05/27/21 1100   Pre Size Width 28 05/27/21 1100   Pre Size Depth 0.1 05/27/21 1100   Pre Total Sq cm 448 05/27/21 1100   Description red 05/07/21 1400   Prodcut Used ABD Pad;Viscopaste 05/25/21 1400        Laboratory/Diagnostics studes:  None to review.       Diagnoses:  1. Skin ulcer of right lower leg, limited to breakdown of skin (H)  Change dressing    Miscellaneous DME (fill in details in comments)   2. Acquired lymphedema of leg  Miscellaneous DME (fill in details in comments)   3. Venous insufficiency of both lower extremities  " Miscellaneous DME (fill in details in comments)   4. Lipedema  Miscellaneous DME (fill in details in comments)   5. Morbid obesity with BMI of 50.0-59.9, adult (H)  Miscellaneous DME (fill in details in comments)   6. Lymphedema of both lower extremities  Miscellaneous DME (fill in details in comments)       Are any of these wounds new today: No.      Assessment/Plan:  1. Debridement: NA     2. Treatment: wound treatment will include irrigation and dressings to promote autolytic debridement which will include: Xeroform to the proximal margin to prevent dressings from pulling at the hyperkeratotic tissue; Unna boot in burn fold pattern; ABD pads; size G and size J/K tubular compression with 4\" ace wrap.   a. Patient is changing outer absorbant dressings 1-2x daily.   b. Full compression is not always reapplied.   c. She will continue to come to the clinic twice weekly for new viscose paste gauze to be applied.     3. Edema: Patient completing lymphedema therapy two - three times weekly. Has not been able to advance to short stretch compression from size G and J/K tubular compression. Not tolerating short stretch or two layers of any compression at this time.  a. Patient interested in lymphedema pumps.     4. Offloading: NA    5. Nutrition: NA    6. Diagnostics: NA    7. Medications:  NA    8. Education: See above. Education provided regarding consideration of long-term short term compression possibly in conjunction with pneumatic pumps. Patient is interested in these modalities.  Patient verbalizes understanding and all questions answered to her satisfaction.        Impression:  Elizabeth Kelley is a 75 year old patient with extensive ulceration of the entire right lower leg complicated by recurrent infection, acquired lymphedema following lymphectomies, and venous insufficiency s/p Right Lower Extremity interventions. She has a history of Left Lower Extremity ulcers which have healed. She also has an altered gait " secondary to bilateral valgus deformity, left ankle contracture and bilateral flat feel in conjunction with multiple sclerosis. She continues to be ambulatory without assistive device. The patient has started lymphedema therapy to redirect lymph fluid back into the circulatory system; the patient reports significantly lessened drainage and pain. She has completed culture directed antibiotics without GI distress.     Today, Right Lower Extremity ulcer, is stable and limited to skin breakdown. There has not been significant advancement due to intermittent pain due to swelling from standing on extended hours and resulting intolerance of therapeutic level of compression. I did have a adrianne conversation with the patient regarding end-stage venous insufficiency and lymphedema and that it will take a greater level of compliance with compression and all other recommended treatments and lifestyle changes to attain healing and maintain healing for the rest of her life. I explicitly discuss that if this ulcer is unable to heal she will be a high risk for recurrent infections possible progressing to sepsis, end-organ damage, and necessity for an above-the-knee amputation; this would render her wheelchair bound. She is tolerating lymphedema therapy. She is interested in using pneumatic compression pumps in conjunction with wraps.     They were instructed to call the clinic sooner with any signs or symptoms of infection or any further questions/concerns. Answered all questions.      Maria G Yeboah, MSN, CNP, CWOCN-AP  [facility]  [phone]

## 2021-07-04 NOTE — PROGRESS NOTES
"Progress Notes by Kelsy Navarro RN at 6/16/2021  1:00 PM     Author: Kelsy Navarro RN Service: -- Author Type: Registered Nurse    Filed: 6/16/2021  1:53 PM Encounter Date: 6/16/2021 Status: Signed    : Kelsy Navarro RN (Registered Nurse)           Right leg 6/16        Bilateral legs 6/16    Nurse Visit    Chief Complaint: Patient presents to clinic for assessment and treatment of their right leg weeping with ulcers and bilateral lower extremity swelling.    Dressing on Arrival: Xeroform, viscopaste, baby diapers (5)    Patient came in for routine dressing change to right leg after therapy. Baby diapers were saturated with yellowish drainage. Rates pain \"2\" out of 10 but states that occasionally it jumps to a \"5\" in right leg. Per report the therapist is pleased as her volume measures are down 8% on the right leg. Tolerated dressing change well. Had to very gently dab legs to cleanse as she is unable to tolerate any more than that. Next Tuesday Kimmy from [employer] will come for pump education and demo. Patient will have a nurse visit after this.  Allergies:   Allergies   Allergen Reactions   ? Other Environmental Allergy Anaphylaxis     Bees/Wasps Stings   ? Adhesive      Other reaction(s): sores   ? Vicodin [Hydrocodone-Acetaminophen] Nausea And Vomiting and Rash       Medications:   Current Outpatient Medications:   ?  aspirin 81 mg chewable tablet, Chew 81 mg daily., Disp: , Rfl:   ?  buPROPion (WELLBUTRIN SR) 150 MG 12 hr tablet, , Disp: , Rfl:   ?  buPROPion (WELLBUTRIN XL) 150 MG 24 hr tablet, Take 150 mg by mouth daily., Disp: , Rfl:   ?  cholecalciferol, vitamin D3, 1,000 unit tablet, Take 2,000 Units by mouth daily., Disp: , Rfl:   ?  furosemide (LASIX) 20 MG tablet, , Disp: , Rfl:   ?  multivitamin therapeutic (THERAGRAN) tablet, Take 1 tablet by mouth daily., Disp: , Rfl:   ?  pantoprazole (PROTONIX) 40 MG tablet, Take 40 mg by mouth daily., Disp: , Rfl:   ?  potassium chloride SA " (K-DUR,KLOR-CON) 20 MEQ tablet, , Disp: , Rfl:   ?  rOPINIRole (REQUIP) 1 MG tablet, Take 1 mg by mouth 2 (two) times a day., Disp: , Rfl:   ?  simvastatin (ZOCOR) 40 MG tablet, Take 40 mg by mouth bedtime., Disp: , Rfl:   ?  spironolactone (ALDACTONE) 25 MG tablet, Take 25 mg by mouth daily., Disp: , Rfl:     Current Facility-Administered Medications:   ?  lidocaine 2 % jelly (XYLOCAINE), , Topical, PRN, Rima Melendez MD, Given at 03/09/16 0928  ?  lidocaine 2 % jelly (XYLOCAINE), , Topical, PRN, Maria G Yeboah CNP, 1 application at 04/28/21 1328    Vital Signs: /76 (Patient Site: Left Arm, Patient Position: Sitting, Cuff Size: Adult Large)   Pulse 78   Temp 97.6  F (36.4  C) (Temporal)   Resp 18       Assessment:    General:  Patient presents to clinic in no apparent distress.  Psychiatric:  Alert and oriented .   Lower extremity:  edema is present.    Integumentary:  Skin is uniformly warm, dry and pink.    Wound size:   VASC Wound 04/28/21 RIght lower leg (Active)   Pre Size Length 19 06/14/21 1400   Pre Size Width 28 06/14/21 1400   Pre Size Depth 0.1 06/14/21 1400   Pre Total Sq cm 532 06/14/21 1400   Description red,macerated 06/10/21 1100   Prodcut Used Viscopaste;Gauze 06/01/21 1300      Undermining is not present.    The periwoundskin is pink/red in areas     Vasc Edema 5/13/2021 5/17/2021 5/25/2021 6/10/2021 6/16/2021   Right just above MTP 22.0 24.6 25.5 25.5 23.6   Right Ankle 24.4 24.6 29 25.5 25.4   Right Widest Calf 57.8 60.6 62 61 56.6   Right Thigh Up 10cm - - 75.5 78 -   Left - just above MTP 21.8 23.6 23.5 23.5 22.6   Left Ankle 24.4 24.2 25.5 28 24.2   Left Widest Calf 52.5 53.2 43.5 54 53.6   Left Thigh Up 10cm - - - 71 -       Plan:         1. Patient will follow up on Tuesday 6/22         2. Treatment provided will include irrigation and dressings to promote autolytic debridement and will be as listed below     Cleansed with: Normal Saline    Protected skin with:  NA    Dressings Applied: xeroform, viscopaste, baby diapers for absorbancy    Compression Applied to the Left Leg: Dermafit size F      Tubular compression was applied from base of toes to just below the bend of knee    Compression Applied to the Right Leg: Dermafit size G    Tubular compression was applied from base of toes to just below the bend of knee    Offloading applied: None  Trial Products: no  Provider notified regarding concerns: no  Treatment Changes: no    Educational Barriers: none  Taught Regarding: Activity, Compliance, Compression, Dressing, Hygiene, Infection and Pain control  Teaching Method: Explanation and Handout

## 2021-07-04 NOTE — PATIENT INSTRUCTIONS - HE
Patient Instructions by Kelsy Navarro RN at 6/16/2021  1:00 PM     Author: Kelsy Navarro RN Service: -- Author Type: Registered Nurse    Filed: 6/16/2021  1:07 PM Encounter Date: 6/16/2021 Status: Signed    : Kelsy Navarro RN (Registered Nurse)       Wound Care Instructions  Right lower leg:     Change your dressing at home as needed to control the drainage.      Leave the xeroform and viscopaste in place and change outer absorbant layers.     SEEK MEDICAL CARE IF:    You have an increase in swelling, pain, or redness around the wound.    You have an increase in the amount of pus coming from the wound.    There is a bad smell coming from the wound.    The wound appears to be worsening/enlarging    You have a fever greater than 101.5 F        When at home please add the foot piece of tubular compression (size F). It is best to have compression on your foot as well as your leg.     Return to clinic on Thursday for appointment with Maria G Yeboah CNP        Swelling and Compression Therapy     Swelling in the legs can be caused by many reasons. No matter what the reason, treatment usually includes some type of compression. This may be done with a support sock, dressing, ace wrap, or layered wraps.      It is important to treat the swelling for many reasons. If the swelling is not treated you may develop blisters that can lead to ulcerations. This is caused when extra fluid goes into tissue causing damage and blocking blood flow to the tissue.      It is important that you wear your compression every day, including days that you will be seen in clinic.      Compression is often the most important part of treating leg wounds. Without controlling the swelling it is often not possible to heal wounds.      Going without compression for even brief periods of time can be damaging to your legs and your health.  Your compression should be put on first thing in the morning. Take the compression off at night only when  instructed by your care provider to do so. Sometimes wearing compression 24 hours a day will be recommended.        If you are having difficulty wearing your compression it is important to notify your care provider so that other options may be reviewed.     Please call us if you have any questions [phone].     Thank you for choosing  [email or web address] [place].

## 2021-07-04 NOTE — PROGRESS NOTES
"Progress Notes by Kelsy Navarro RN at 6/14/2021  2:00 PM     Author: Kelsy Navarro RN Service: -- Author Type: Registered Nurse    Filed: 6/14/2021  2:55 PM Encounter Date: 6/14/2021 Status: Signed    : Kelsy Navarro RN (Registered Nurse)           Right leg 6/14    Nurse Visit    Chief Complaint: Patient presents to clinic for assessment and treatment of their right leg ulcers with weeping and lower extremity swelling.    Dressing on Arrival: Xeroform, viscopaste, diapers, tubular compression    Patient came in today for routine dressing change to right leg. Rates pain \"2\" out of 10. She states she does not feel well today but cannot really explain why. Diapers on old dressing were fully saturated with clear yellowish drainage. Patient had come from therapy this morning. Tolerated dressing change with gentle blotting for cleansing. Dressings applied per orders. Plan is for patient to return on Wednesday for dressing change/nurse visit.  Allergies:   Allergies   Allergen Reactions   ? Other Environmental Allergy Anaphylaxis     Bees/Wasps Stings   ? Adhesive      Other reaction(s): sores   ? Vicodin [Hydrocodone-Acetaminophen] Nausea And Vomiting and Rash       Medications:   Current Outpatient Medications:   ?  aspirin 81 mg chewable tablet, Chew 81 mg daily., Disp: , Rfl:   ?  buPROPion (WELLBUTRIN SR) 150 MG 12 hr tablet, , Disp: , Rfl:   ?  buPROPion (WELLBUTRIN XL) 150 MG 24 hr tablet, Take 150 mg by mouth daily., Disp: , Rfl:   ?  cholecalciferol, vitamin D3, 1,000 unit tablet, Take 2,000 Units by mouth daily., Disp: , Rfl:   ?  furosemide (LASIX) 20 MG tablet, , Disp: , Rfl:   ?  multivitamin therapeutic (THERAGRAN) tablet, Take 1 tablet by mouth daily., Disp: , Rfl:   ?  pantoprazole (PROTONIX) 40 MG tablet, Take 40 mg by mouth daily., Disp: , Rfl:   ?  potassium chloride SA (K-DUR,KLOR-CON) 20 MEQ tablet, , Disp: , Rfl:   ?  rOPINIRole (REQUIP) 1 MG tablet, Take 1 mg by mouth 2 (two) times a " day., Disp: , Rfl:   ?  simvastatin (ZOCOR) 40 MG tablet, Take 40 mg by mouth bedtime., Disp: , Rfl:   ?  spironolactone (ALDACTONE) 25 MG tablet, Take 25 mg by mouth daily., Disp: , Rfl:     Current Facility-Administered Medications:   ?  lidocaine 2 % jelly (XYLOCAINE), , Topical, PRN, Rima Melendez MD, Given at 03/09/16 0928  ?  lidocaine 2 % jelly (XYLOCAINE), , Topical, PRN, Maria G Yeboah, CNP, 1 application at 04/28/21 1328    Vital Signs: /62 (Patient Site: Left Arm, Patient Position: Sitting, Cuff Size: Adult Large)   Pulse 78   Temp 98.2  F (36.8  C) (Temporal)   Resp 18       Assessment:    General:  Patient presents to clinic in no apparent distress.  Psychiatric:  Alert and oriented .   Lower extremity:  edema is present.    Integumentary:  Skin is uniformly warm, dry and pink.    Wound size:   VASC Wound 04/28/21 RIght lower leg (Active)   Pre Size Length 19 06/14/21 1400   Pre Size Width 28 06/14/21 1400   Pre Size Depth 0.1 06/14/21 1400   Pre Total Sq cm 532 06/14/21 1400   Description red,macerated 06/10/21 1100   Prodcut Used Viscopaste;Gauze 06/01/21 1300      Undermining is not present.    The periwoundskin is pink/red in areas    Vasc Edema 5/10/2021 5/13/2021 5/17/2021 5/25/2021 6/10/2021   Right just above MTP 23.8 22.0 24.6 25.5 25.5   Right Ankle 25.0 24.4 24.6 29 25.5   Right Widest Calf 61.4 57.8 60.6 62 61   Right Thigh Up 10cm - - - 75.5 78   Left - just above MTP 22.8 21.8 23.6 23.5 23.5   Left Ankle 25.6 24.4 24.2 25.5 28   Left Widest Calf 54.8 52.5 53.2 43.5 54   Left Thigh Up 10cm - - - - 71       Plan:         1. Patient will follow up on Wednesday for nurse visit/dressing change         2. Treatment provided will include irrigation and dressings to promote autolytic debridement and will be as listed below     Cleansed with: Normal Saline    Protected skin with: lotion applied only to foot (intact areas)    Dressings Applied: xeroform around proximal  edge of dressing, viscopaste in fan fold technique, diapers, roll gauze     Compression Applied to the Left Leg: Tubular compression      Tubular compression was applied from base of toes to just below the bend of knee    Compression Applied to the Right Leg: Dermafit    Tubular compression was applied from base of toes to just below the bend of knee    Offloading applied: None  Trial Products: no  Provider notified regarding concerns: no  Treatment Changes: no    Educational Barriers: none  Taught Regarding: Activity, Compliance, Compression, Dressing, Hygiene, Infection and Nutrition  Teaching Method: Explanation and Handout

## 2021-07-04 NOTE — PROGRESS NOTES
Progress Notes by Maria G Yeboah CNP at 6/10/2021 11:00 AM     Author: Maria G Yeboah CNP Service: -- Author Type: Nurse Practitioner    Filed: 6/10/2021  1:26 PM Encounter Date: 6/10/2021 Status: Signed    : Maria G Yeboah CNP (Nurse Practitioner)               [facility]     FOLLOW UP NOTE      Date of Service: 6/10/2021    Chief Complaint: Right Lower Extremity ulcer    Pt returns to [facility] for evaluation of the above listed concern. Medical history of multiple sclerosis, obesity, acquired lymphedema following lymphectomies, and venous insufficiency s/p Right Lower Extremity interventions. She has a history of recurrent lower limb cellulitis and  Left Lower Extremity ulcers which have healed. She also has an altered gait secondary to bilateral valgus deformity, left ankle contracture and bilateral flat feel in conjunction with multiple sclerosis.    Today, she denies fever, chills or constitutional changes. Pain is rated at 2 out of 10, which is the best it has been. She is going to lymphedema therapy 2x weekly.  Manual lymphatic drainage is being done. Lymphedema pumps have been ordered. She has reduced her number of days at work to 4 out of 7 and will try to split these days so she is not on her feet for extended days in a row. Current dressings include Xeroform to devon ulcer skin to prevent adhesive trauma, viscose paste gauze over the open skin, baby diapers for super absorbency, and compression with single layer of size G tubular compression. This is changed twice weekly in the clinic.       Allergies: Other environmental allergy, Adhesive, and Vicodin [hydrocodone-acetaminophen]      Medications:   Current Outpatient Medications:   ?  aspirin 81 mg chewable tablet, Chew 81 mg daily., Disp: , Rfl:   ?  buPROPion (WELLBUTRIN SR) 150 MG 12 hr tablet, , Disp: , Rfl:   ?  buPROPion (WELLBUTRIN XL) 150 MG 24 hr tablet, Take 150  mg by mouth daily., Disp: , Rfl:   ?  cholecalciferol, vitamin D3, 1,000 unit tablet, Take 2,000 Units by mouth daily., Disp: , Rfl:   ?  furosemide (LASIX) 20 MG tablet, , Disp: , Rfl:   ?  multivitamin therapeutic (THERAGRAN) tablet, Take 1 tablet by mouth daily., Disp: , Rfl:   ?  pantoprazole (PROTONIX) 40 MG tablet, Take 40 mg by mouth daily., Disp: , Rfl:   ?  potassium chloride SA (K-DUR,KLOR-CON) 20 MEQ tablet, , Disp: , Rfl:   ?  rOPINIRole (REQUIP) 1 MG tablet, Take 1 mg by mouth 2 (two) times a day., Disp: , Rfl:   ?  simvastatin (ZOCOR) 40 MG tablet, Take 40 mg by mouth bedtime., Disp: , Rfl:   ?  spironolactone (ALDACTONE) 25 MG tablet, Take 25 mg by mouth daily., Disp: , Rfl:     Current Facility-Administered Medications:   ?  lidocaine 2 % jelly (XYLOCAINE), , Topical, PRN, Rima Melendez MD, Given at 03/09/16 0928  ?  lidocaine 2 % jelly (XYLOCAINE), , Topical, PRN, Maria G Yeboah CNP, 1 application at 04/28/21 1328      History:   Past Medical History:   Diagnosis Date   ? Ankle contracture, left    ? Class 3 severe obesity due to excess calories without serious comorbidity with body mass index (BMI) of 45.0 to 49.9 in adult (H)    ? Combined gastric and duodenal ulcer    ? Depression    ? Dyslipidemia    ? Edema    ? Gait abnormality    ? GERD (gastroesophageal reflux disease)    ? Lymphedema of both lower extremities    ? Melanoma (H)     left upper arm   ? MS (multiple sclerosis) (H)    ? RLS (restless legs syndrome)    ? Skin ulcer of left lower leg (H)    ? Valgus deformity of both feet    ? Vitamin D deficiency        Physical Exam:    /60   Pulse 76   Temp 98.4  F (36.9  C)   Resp 12     General:  Patient presents to clinic, she is tearful.  Head: normocephalic atraumatic  Psychiatric:  Alert and oriented x3.   Respiratory: unlabored breathing; no cough  Integumentary:  Skin is uniformly warm, dry and pink.      Circumferential volume measures:  Vasc Edema  5/10/2021 5/13/2021 5/17/2021 5/25/2021 6/10/2021   Right just above MTP 23.8 22.0 24.6 25.5 25.5   Right Ankle 25.0 24.4 24.6 29 25.5   Right Widest Calf 61.4 57.8 60.6 62 61   Right Thigh Up 10cm - - - 75.5 78   Left - just above MTP 22.8 21.8 23.6 23.5 23.5   Left Ankle 25.6 24.4 24.2 25.5 28   Left Widest Calf 54.8 52.5 53.2 43.5 54   Left Thigh Up 10cm - - - - 71       Ulceration(s)/Wound(s):   Wound/Ulcer location: RIGHT LOWER LEG   Size: See below.   Edges: Attached and incorporating.  Undermining: none  Base: pink with coalescing area of yellow fibrin over the mid shin; there are epithelial islands throughout the ulcer base with notable new epithelium over the lateral lower leg.  Tissues: dermis  Thickness: partial  Exudate Type: serous  Exudate Amount: large to copious  Michelle-Wound/Ulcer: No erythema.   Odor: none    VASC Wound 04/28/21 RIght lower leg (Active)   Pre Size Length 19 06/10/21 1100   Pre Size Width 28 06/10/21 1100   Pre Size Depth 0.1 06/10/21 1100   Pre Total Sq cm 532 06/10/21 1100   Description red,macerated 06/10/21 1100   Prodcut Used Viscopaste;Gauze 06/01/21 1300        Laboratory/Diagnostics studes:  None to review.       Diagnoses:  1. Skin ulcer of right lower leg, limited to breakdown of skin (H)     2. Venous insufficiency of both lower extremities     3. Acquired lymphedema of leg     4. Morbid obesity with BMI of 50.0-59.9, adult (H)         Are any of these wounds new today: No.      Assessment/Plan:  1. Debridement: NA     2. Treatment: wound treatment will include irrigation and dressings to promote autolytic debridement which will include: Xeroform to the proximal margin to prevent dressings from pulling at the hyperkeratotic tissue; Unna boot in fan fold pattern; baby diapers; size Twice weekly in clinic.    3. Edema: Patient completing lymphedema therapy two - three times weekly. Still only toleration tubular compression. Lymphedema pumps ordered.     4. Offloading:  NA    5. Nutrition: NA    6. Diagnostics: NA    7. Medications:  NA    8. Education: See above. Education provided regarding above plan of care.  Patient verbalizes understanding and all questions answered to her satisfaction.        Impression:  Elizabeth Kelley is a 75 year old patient with extensive ulceration of the entire right lower leg complicated by recurrent infection, acquired lymphedema following lymphectomies, and venous insufficiency s/p Right Lower Extremity interventions. She has a history of Left Lower Extremity ulcers which have healed. She also has an altered gait secondary to bilateral valgus deformity, left ankle contracture and bilateral flat feel in conjunction with multiple sclerosis. She continues to be ambulatory without assistive device. The patient has started lymphedema therapy to redirect lymph fluid back into the circulatory system.    Today, the Right Lower Extremity ulcer is limited to skin breakdown. There is new epithelium migration and there is less drainage. Patient's pain is improved significantly. Will continue the current plan of care. Will work toward transitioning from viscose paste to xeroform.       They were instructed to call the clinic sooner with any signs or symptoms of infection or any further questions/concerns. Answered all questions.      Maria G Yeboah, MSN, CNP, CWOCN-AP  [facility]  [phone]

## 2021-07-04 NOTE — ADDENDUM NOTE
Addendum Note by Fawn Polanco CMA at 5/5/2021  9:40 AM     Author: Fawn Polanco CMA Service: -- Author Type: Certified Medical Assistant    Filed: 5/6/2021 12:49 PM Encounter Date: 5/5/2021 Status: Signed    : Fawn Polanco CMA (Certified Medical Assistant)    Addended by: FAWN POLANCO on: 5/6/2021 12:49 PM        Modules accepted: Orders

## 2021-07-04 NOTE — PROGRESS NOTES
Progress Notes by Umair Baca RN at 6/22/2021  1:00 PM     Author: Umair Baca RN Service: -- Author Type: Registered Nurse    Filed: 6/22/2021  2:57 PM Encounter Date: 6/22/2021 Status: Signed    : Umair Baca RN (Registered Nurse)         Right leg wound    Bilateral leg swelling      Right lower leg              Nurse Visit    Chief Complaint: Patient presents to clinic for assement, and treatment of their ulcer and and swelling/ slow improvement,small island of new skin,  flexitouch rep here to do trial/fitting,patient reports goes to lympedema therapy. Arrives with tubular compression from ankle to knee size G. Patient very happy with progress and liked the flexitouch.    Dressing on Arrival  Right xeroform,viscopaste,baby diapers,size G tubular form ankle to knee.    Allergies:   Allergies   Allergen Reactions   ? Other Environmental Allergy Anaphylaxis     Bees/Wasps Stings   ? Adhesive      Other reaction(s): sores   ? Vicodin [Hydrocodone-Acetaminophen] Nausea And Vomiting and Rash       Medications:   Current Outpatient Medications:   ?  aspirin 81 mg chewable tablet, Chew 81 mg daily., Disp: , Rfl:   ?  buPROPion (WELLBUTRIN SR) 150 MG 12 hr tablet, , Disp: , Rfl:   ?  buPROPion (WELLBUTRIN XL) 150 MG 24 hr tablet, Take 150 mg by mouth daily., Disp: , Rfl:   ?  cholecalciferol, vitamin D3, 1,000 unit tablet, Take 2,000 Units by mouth daily., Disp: , Rfl:   ?  furosemide (LASIX) 20 MG tablet, , Disp: , Rfl:   ?  multivitamin therapeutic (THERAGRAN) tablet, Take 1 tablet by mouth daily., Disp: , Rfl:   ?  pantoprazole (PROTONIX) 40 MG tablet, Take 40 mg by mouth daily., Disp: , Rfl:   ?  potassium chloride SA (K-DUR,KLOR-CON) 20 MEQ tablet, , Disp: , Rfl:   ?  rOPINIRole (REQUIP) 1 MG tablet, Take 1 mg by mouth 2 (two) times a day., Disp: , Rfl:   ?  simvastatin (ZOCOR) 40 MG tablet, Take 40 mg by mouth bedtime., Disp: , Rfl:   ?  spironolactone (ALDACTONE) 25 MG tablet, Take 25 mg by  mouth daily., Disp: , Rfl:     Current Facility-Administered Medications:   ?  lidocaine 2 % jelly (XYLOCAINE), , Topical, PRN, Rima Melendez MD, Given at 03/09/16 0928  ?  lidocaine 2 % jelly (XYLOCAINE), , Topical, PRN, Maria G Yeboah CNP, 1 application at 04/28/21 1328    Vital Signs: /60   Pulse 72   Temp 97.9  F (36.6  C)   Resp 12       Assessment:    General:  Patient presents to clinic in no apparent distress.  Psychiatric:  Alert and oriented x3.   Lower extremity:  edema is present.    Integumentary:  Skin is red  Wound size:   VASC Wound 04/28/21 RIght lower leg (Active)   Pre Size Length 19 06/22/21 1300   Pre Size Width 28 06/22/21 1300   Pre Size Depth 0.1 06/22/21 1300   Pre Total Sq cm 532 06/22/21 1300   Description red,macerated 06/22/21 1300   Prodcut Used Viscopaste 06/22/21 1300      Undermining is not present.    The periwoundskin is red      Plan:         1. Patient will f/u thurday         2. Treatment provided will include irrigation and dressings to promote autolytic debridement and will be as listed below     Cleansed with: Hibiclens    Protected skin with:refused    Dressings Applied:xeroform,viscopaste,baby diapers    Compression Applied to the Left Leg: Tubigrip size G from ankle to knee    Compression Applied to the Right Leg: Tubigrip / size G from ankle to knee  Offloading applied: None    Trial Products: no  Provider notified regarding concerns: no  Treatment Changes: no    Educational Barriers: none  Taught Regarding: Activity, Compliance, Compression, Disease and Dressing  Teaching Method: Explanation and DC sheet

## 2021-07-04 NOTE — PROGRESS NOTES
Progress Notes by Pooja Diallo PTA at 6/2/2021 11:00 AM     Author: Pooja Diallo PTA Service: -- Author Type: Physical Therapist Assistant    Filed: 6/2/2021 11:53 AM Encounter Date: 6/2/2021 Status: Attested    : Pooja Diallo PTA (Physical Therapist Assistant) Cosigner: Bee Olguin PT at 6/2/2021  5:55 PM    Attestation signed by Bee Olguin PT at 6/2/2021  5:55 PM    Physical Therapist observed treatment and reviewed patient's subjective and objective progress, progress towards goals and plan of care with the PTA.  It is appropriate to continue per Plan of Care.  Bee Olguin PT, DPT, OCS, CLT                  [facility] Daily Progress     Patient Name: Elizabeth Kelley  Date: 6/2/2021  Visit #: 8  PTA visit #:  6  Referral Diagnosis: Lymphedema  Referring provider: Francisco Ramirez MD  Visit Diagnosis:     ICD-10-CM    1. Lymphedema  I89.0    2. Venous stasis ulcer of other part of right lower leg limited to breakdown of skin with varicose veins (H)  I83.018     L97.811    3. Localized swelling of both lower legs  R22.43          Assessment:     Pt reports he wound is looking better.  Pt with no weeping onto tubes today.        Patient is benefitting from skilled physical therapy and is making steady progress toward functional goals.  Patient is appropriate to continue with skilled physical therapy intervention, as indicated by initial plan of care.    Goal Status:    Patient will have a decreased volume in : right;LE;by 5%;to decrease risk of infection;for better fit of clothing;for improved body image;for ease of movement;in 12 weeks - IMPROVING    Patient will have decreased fibrosis, scar tissue for improved lymphatic mobilitiy : in 12 weeks - IMPROVING - softening on B medial thighs and L lower leg    Patient will perform, verbalize self-management of: skin care;self-monitoring;exercise;massage;self-compression;compression wear;compression care;infection  prevention;in 12 weeks - IMPROVING - performing wound dressing changes and compression wraps    Pt will: demo decreased wound size by 50% for improvement of skin quality and decreased risk for infections in 12 weeks.- ongoing      Plan / Patient Education:     Continue with initial plan of care.  Progress with home program as tolerated.     Pt to continue with HEP, wound dressing changes and compression bandaging.  Continue with MLD and modify compression as needed as wounds heal.    Subjective:       Pain Ratin/10 currently  Pt was at the vascular center yesterday. Pt will F/U there again on Friday.   Pt asking about the compression pump.    Objective:   Caregiver present: NO    Observation of swelling: unchanged .     Volume measurements taken:  No  Right Lower Extremity Total Estimated Volume (cm3): 72131.76  Left Lower Extremity Total Estimated Volume (cm3): 8243.03  Right LE change of volume from initial (%): 0.8  Left LE change of volume from initial (%): -1.52  Lower Extremity Swelling Comparison (%): 39.14 %    Lymphedema Assessment 2021   Right Lower Extremity Total Estimated Volume (cm3) 18878.78 14471.76   Right LE change of volume from last visit (%) - 0.8   Right LE change of volume from initial (%) - 0.8   Left Lower Extremity Total Estimated Volume (cm3) 8370.65 8243.03   Left LE change of volume from last visit (%) - -1.52   Left LE change of volume from initial (%) - -1.52   Swelling Description Right>Left Right>Left   Lower Extremity Swelling Comparison (%) 35.94 39.14     Skin condition is:  Dry, red, severe Fibrosis    Compression: Comperm B    Medication for infection:  Not currently      Treatment Today     TREATMENT MINUTES COMMENTS   Evaluation     Self-care/ Home management  Showed pt the compression pump. Pt stated she would be interested in one for home. He will talk to Maria G at the Vascular center on Friday.   Manual therapy 55 .     Manual therapy: manual lymph  drainage for bilateral lower extremities lymphedema  Deep abdominal breath, bilateral axilla, bilateral inguinal,abdomen, anterior inguinal to axilla anastomosis on right side and left side, left lower extremity evacuation, right lower extremity evacuation, abdomen effleurage.    Pt will bandage when she gets home   Neuromuscular Re-education     Therapeutic Activity     Therapeutic Exercises     Gait training     Modality__________________                Total 55    Blank areas are intentional and mean the treatment did not include these items.       Pooja Diallo PT, DPT, CLT, OCS  6/2/2021

## 2021-07-04 NOTE — ADDENDUM NOTE
Addendum Note by Fawn Polanco CMA at 6/10/2021 11:00 AM     Author: Fawn Polanco CMA Service: -- Author Type: Certified Medical Assistant    Filed: 6/10/2021  2:38 PM Encounter Date: 6/10/2021 Status: Signed    : Fawn Polanco CMA (Certified Medical Assistant)    Addended by: FAWN POLANCO on: 6/10/2021 02:38 PM        Modules accepted: Orders

## 2021-07-04 NOTE — PROGRESS NOTES
Progress Notes by Christina Wills RN at 6/4/2021  1:00 PM     Author: Christina Wills RN Service: -- Author Type: Registered Nurse    Filed: 6/4/2021 12:44 PM Encounter Date: 6/4/2021 Status: Signed    : Christina Wills RN (Registered Nurse)       Nurse Visit          Chief Complaint: Patient presents to clinic for assessment and treatment of their ulcer and and swelling    Dressing on Arrival: small amount of drainage to diapers on right leg. Pt also had white paper covering for a kay on right leg.  Tubular to bilat leg. Coban to left leg. Informed pt not to wear coban.    Patient came in today for dressing change and short stretch to right layer rewrap per order from Maria G Yeboah CNP. Patient rated pain 3 out of 10. Removed dressings. Applied lotion to intact skin.  Applied viscopaste and xeroform to wound and covered with baby diapers. Rewrapped short stretch layer compression wrap to right leg, and tubular to left leg. Patient tolerated dressing change well.         Allergies:   Allergies   Allergen Reactions   ? Other Environmental Allergy Anaphylaxis     Bees/Wasps Stings   ? Adhesive      Other reaction(s): sores   ? Vicodin [Hydrocodone-Acetaminophen] Nausea And Vomiting and Rash       Medications:   Current Outpatient Medications:   ?  aspirin 81 mg chewable tablet, Chew 81 mg daily., Disp: , Rfl:   ?  buPROPion (WELLBUTRIN SR) 150 MG 12 hr tablet, , Disp: , Rfl:   ?  buPROPion (WELLBUTRIN XL) 150 MG 24 hr tablet, Take 150 mg by mouth daily., Disp: , Rfl:   ?  cholecalciferol, vitamin D3, 1,000 unit tablet, Take 2,000 Units by mouth daily., Disp: , Rfl:   ?  furosemide (LASIX) 20 MG tablet, , Disp: , Rfl:   ?  multivitamin therapeutic (THERAGRAN) tablet, Take 1 tablet by mouth daily., Disp: , Rfl:   ?  pantoprazole (PROTONIX) 40 MG tablet, Take 40 mg by mouth daily., Disp: , Rfl:   ?  potassium chloride SA (K-DUR,KLOR-CON) 20 MEQ tablet, , Disp: , Rfl:   ?  rOPINIRole (REQUIP) 1 MG tablet, Take 1 mg  by mouth 2 (two) times a day., Disp: , Rfl:   ?  simvastatin (ZOCOR) 40 MG tablet, Take 40 mg by mouth bedtime., Disp: , Rfl:   ?  spironolactone (ALDACTONE) 25 MG tablet, Take 25 mg by mouth daily., Disp: , Rfl:     Current Facility-Administered Medications:   ?  lidocaine 2 % jelly (XYLOCAINE), , Topical, PRN, Rima Melendez MD, Given at 03/09/16 0928  ?  lidocaine 2 % jelly (XYLOCAINE), , Topical, PRN, Maria G Yeboah, CNP, 1 application at 04/28/21 1328    Vital Signs: /70   Pulse 76   Temp 98  F (36.7  C)   Resp 18       Assessment:    General:  Patient presents to clinic in no apparent distress.  Psychiatric:  Alert and oriented .   Lower extremity:  edema is present.    Integumentary:  Skin is uniformly warm, dry and pink.    Wound size:   [facility] Wound 04/28/21 RIght lower leg (Active)   Pre Size Length 19 06/04/21 1100   Pre Size Width 28 06/04/21 1100   Pre Size Depth 0.1 06/04/21 1100   Pre Total Sq cm 532 06/01/21 1300   Description red, macerated 06/01/21 1300   Prodcut Used Viscopaste;Gauze 06/01/21 1300      Undermining is not present.    The periwoundskin is red          Plan:         1. Patient will follow up on 6/7/21         2. Treatment provided will include irrigation and dressings to promote autolytic debridement and will be as listed below      Dressings Applied: viscopaste and xeroform with baby diapers to right leg. ABD and roll gauze to left leg.    Compression Applied to the Left Leg: tubular compression from ankle to below knee    Compression Applied to the Right Leg: Short Stretch    Short Stretch was applied from base of toes to just below the bend of knee     Offloading applied: None  Trial Products: no  Provider notified regarding concerns: no  Treatment Changes: no    Educational Barriers: none  Taught Regarding: Activity, Compliance, Compression, Dressing, Hygiene and Infection  Teaching Method: Explanation and DC sheet

## 2021-07-04 NOTE — PROGRESS NOTES
Progress Notes by Umair Baca RN at 5/25/2021  2:00 PM     Author: Umair Baca RN Service: -- Author Type: Registered Nurse    Filed: 5/25/2021  3:52 PM Encounter Date: 5/25/2021 Status: Signed    : Umair Baca RN (Registered Nurse)                                 Nurse Visit    Chief Complaint: Patient presents to clinic for assement, and treatment of their ulcer and and swelling right leg skin island noted, patient arrives with coban around left calf and tubular from ankle to calf on right. 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 I reviewed the importance of elevation of feet above your heart.Patient was crying for most of the visit.Pain at a 3 on scale when arrived went to an 8. Spoke with Maria G cespedes CNP ok to try ace wraps and spiral viscopaste. Size G tubular fromfoot to shin then size J/K. Advised patient to go to ED if increased pain or fever.   Patient called in sick tomorrow as says with humidly her legs swell      Dressing on Arrival viscopaste,xeroform,abd, baby diapers, tubular compression on right from ankle to knee, left calf coban    Allergies:   Allergies   Allergen Reactions   ? Other Environmental Allergy Anaphylaxis     Bees/Wasps Stings   ? Adhesive      Other reaction(s): sores   ? Vicodin [Hydrocodone-Acetaminophen] Nausea And Vomiting and Rash       Medications:   Current Outpatient Medications:   ?  aspirin 81 mg chewable tablet, Chew 81 mg daily., Disp: , Rfl:   ?  buPROPion (WELLBUTRIN SR) 150 MG 12 hr tablet, , Disp: , Rfl:   ?  buPROPion (WELLBUTRIN XL) 150 MG 24 hr tablet, Take 150 mg by mouth daily., Disp: , Rfl:   ?  cholecalciferol, vitamin D3, 1,000 unit tablet, Take 2,000 Units by mouth daily., Disp: , Rfl:   ?  doxycycline (MONODOX) 100 MG capsule, Take 1 capsule (100 mg total) by mouth 2  (two) times a day for 10 days., Disp: 20 capsule, Rfl: 0  ?  furosemide (LASIX) 20 MG tablet, , Disp: , Rfl:   ?  multivitamin therapeutic (THERAGRAN) tablet, Take 1 tablet by mouth daily., Disp: , Rfl:   ?  pantoprazole (PROTONIX) 40 MG tablet, Take 40 mg by mouth daily., Disp: , Rfl:   ?  potassium chloride SA (K-DUR,KLOR-CON) 20 MEQ tablet, , Disp: , Rfl:   ?  rOPINIRole (REQUIP) 1 MG tablet, Take 1 mg by mouth 2 (two) times a day., Disp: , Rfl:   ?  simvastatin (ZOCOR) 40 MG tablet, Take 40 mg by mouth bedtime., Disp: , Rfl:   ?  spironolactone (ALDACTONE) 25 MG tablet, Take 25 mg by mouth daily., Disp: , Rfl:     Current Facility-Administered Medications:   ?  lidocaine 2 % jelly (XYLOCAINE), , Topical, PRN, Rima Melendez MD, Given at 03/09/16 0928  ?  lidocaine 2 % jelly (XYLOCAINE), , Topical, PRN, Maria G Yeboah CNP, 1 application at 04/28/21 1328    Vital Signs: /60   Pulse 72   Temp 98.6  F (37  C)   Resp 12       Assessment:    General:  Patient presents to clinic in no apparent distress.  Psychiatric:  Alert and oriented x3.   Lower extremity:  edema is present.    Integumentary:  Skin is hemosiderin, masceration  Wound size:   VASC Wound 04/28/21 Left inner thigh wound (Active)   Pre Size Length 0.4 05/13/21 1400   Pre Size Width 1.2 05/13/21 1400   Pre Size Depth 0.1 05/13/21 1400   Pre Total Sq cm 0.48 05/13/21 1400   Description healed 05/25/21 1400       VASC Wound 04/28/21 RIght lower leg (Active)   Pre Size Length 16 05/25/21 1400   Pre Size Width 28 05/25/21 1400   Pre Size Depth 0.1 05/25/21 1400   Pre Total Sq cm 448 05/25/21 1400   Description red 05/07/21 1400   Prodcut Used ABD Pad;Viscopaste 05/25/21 1400      Vasc Edema 4/28/2021 5/10/2021 5/13/2021 5/17/2021 5/25/2021   Right just above MTP 23.6 23.8 22.0 24.6 25.5   Right Ankle 24.5 25.0 24.4 24.6 29   Right Widest Calf 62.2 61.4 57.8 60.6 62   Right Thigh Up 10cm 77.4 - - - 75.5   Left - just above MTP 22.5  22.8 21.8 23.6 23.5   Left Ankle 24.5 25.6 24.4 24.2 25.5   Left Widest Calf 56.6 54.8 52.5 53.2 43.5   Left Thigh Up 10cm 76.8 - - - -      Undermining is not present.    The periwoundskin is raw weeping right lower leg continues     Plan:         1. Patient will Thursday with NP         2. Treatment provided will include irrigation and dressings to promote autolytic debridement and will be as listed below     Cleansed with: Hibiclens    Protected skin with: Remedy Skin Repair    Dressings Applied: xeroform,viscopaste spiral right leg,abd, roll gauze,sixe G and J/K Ace wrap toes to knee    Compression Applied to the Left Leg: Tubigrip    Compression Applied to the Right Leg: Tubigrip    Offloading applied: None    Trial Products: no  Provider notified regarding concerns: yes  Treatment Changes: no    Educational Barriers: none  Taught Regarding: Activity, Compliance, Compression, Disease, Dressing, Infection, Pain control and Weight management  Teaching Method: Explanation, Demo and DC sheet

## 2021-07-04 NOTE — PROGRESS NOTES
Progress Notes by Fady Miller RN at 6/1/2021  1:00 PM     Author: Fady Miller RN Service: -- Author Type: Registered Nurse    Filed: 6/1/2021  2:06 PM Encounter Date: 6/1/2021 Status: Signed    : Fady Miller RN (Registered Nurse)         RIGHT LEG              Nurse Visit    Chief Complaint: Patient presents to clinic for assement, and treatment of their ulcer and and swelling    Dressing on Arrival : viscopaste, diaper pad, roll gauze, tubular compression, ace bandage on right leg. She states that the xeroform fell off between dressing changes.       Allergies:   Allergies   Allergen Reactions   ? Other Environmental Allergy Anaphylaxis     Bees/Wasps Stings   ? Adhesive      Other reaction(s): sores   ? Vicodin [Hydrocodone-Acetaminophen] Nausea And Vomiting and Rash       Medications:   Current Outpatient Medications:   ?  aspirin 81 mg chewable tablet, Chew 81 mg daily., Disp: , Rfl:   ?  buPROPion (WELLBUTRIN SR) 150 MG 12 hr tablet, , Disp: , Rfl:   ?  buPROPion (WELLBUTRIN XL) 150 MG 24 hr tablet, Take 150 mg by mouth daily., Disp: , Rfl:   ?  cholecalciferol, vitamin D3, 1,000 unit tablet, Take 2,000 Units by mouth daily., Disp: , Rfl:   ?  furosemide (LASIX) 20 MG tablet, , Disp: , Rfl:   ?  multivitamin therapeutic (THERAGRAN) tablet, Take 1 tablet by mouth daily., Disp: , Rfl:   ?  pantoprazole (PROTONIX) 40 MG tablet, Take 40 mg by mouth daily., Disp: , Rfl:   ?  potassium chloride SA (K-DUR,KLOR-CON) 20 MEQ tablet, , Disp: , Rfl:   ?  rOPINIRole (REQUIP) 1 MG tablet, Take 1 mg by mouth 2 (two) times a day., Disp: , Rfl:   ?  simvastatin (ZOCOR) 40 MG tablet, Take 40 mg by mouth bedtime., Disp: , Rfl:   ?  spironolactone (ALDACTONE) 25 MG tablet, Take 25 mg by mouth daily., Disp: , Rfl:     Current Facility-Administered Medications:   ?  lidocaine 2 % jelly (XYLOCAINE), , Topical, PRN, Rima Melendez MD, Given at 03/09/16 0928  ?  lidocaine 2 % jelly (XYLOCAINE), , Topical, PRN, [place],  Maria G Pablo, CNP, 1 application at 04/28/21 1328    Vital Signs: /68   Pulse 76   Temp 98.7  F (37.1  C)   Resp 16       Assessment:    General:  Patient presents to clinic in no apparent distress.  Psychiatric:  Alert and oriented x3.   Lower extremity:  edema is present.    Integumentary:  Skin is macerated, red.   Wound size:   VASC Wound 04/28/21 RIght lower leg (Active)   Pre Size Length 19 06/01/21 1300   Pre Size Width 28 06/01/21 1300   Pre Size Depth 0.1 06/01/21 1300   Pre Total Sq cm 532 06/01/21 1300   Description red, macerated 06/01/21 1300   Prodcut Used Viscopaste;Gauze 06/01/21 1300      Undermining is not present.    The periwoundskin is denudement, erythema and maceration      Plan:         1. Patient will follow up in 2 days with nurse visit.          2. Treatment provided will include irrigation and dressings to promote autolytic debridement and will be as listed below     Cleansed with: water    Protected skin with: NA    Dressings Applied: viscopaste, xeroform, diaper pads, roll gauze.     Compression Applied to the Left Leg: Tubigrip    Compression Applied to the Right Leg: Tubigrip    Offloading applied: None    Trial Products: no  Provider notified regarding concerns: no  Treatment Changes: no    Educational Barriers: functional limitation  Taught Regarding: Activity, Compliance, Compression, Dressing and Hygiene  Teaching Method: Explanation, Handout and Demo

## 2021-07-04 NOTE — PATIENT INSTRUCTIONS - HE
Patient Instructions by Umair Baca RN at 6/22/2021  1:00 PM     Author: Umair Baca RN Service: -- Author Type: Registered Nurse    Filed: 6/22/2021  1:14 PM Encounter Date: 6/22/2021 Status: Signed    : Umair Baca RN (Registered Nurse)       Wound Care Instructions  Right lower leg:     Change your dressing at home as needed to control the drainage.      Leave the xeroform and viscopaste in place and change outer absorbant layers.     SEEK MEDICAL CARE IF:    You have an increase in swelling, pain, or redness around the wound.    You have an increase in the amount of pus coming from the wound.    There is a bad smell coming from the wound.    The wound appears to be worsening/enlarging    You have a fever greater than 101.5 F        When at home please add the foot piece of tubular compression (size F). It is best to have compression on your foot as well as your leg.     Return to clinic on Thursday for appointment with Maria G Yeboah CNP        Swelling and Compression Therapy     Swelling in the legs can be caused by many reasons. No matter what the reason, treatment usually includes some type of compression. This may be done with a support sock, dressing, ace wrap, or layered wraps.      It is important to treat the swelling for many reasons. If the swelling is not treated you may develop blisters that can lead to ulcerations. This is caused when extra fluid goes into tissue causing damage and blocking blood flow to the tissue.      It is important that you wear your compression every day, including days that you will be seen in clinic.      Compression is often the most important part of treating leg wounds. Without controlling the swelling it is often not possible to heal wounds.      Going without compression for even brief periods of time can be damaging to your legs and your health.  Your compression should be put on first thing in the morning. Take the compression off at night only when  instructed by your care provider to do so. Sometimes wearing compression 24 hours a day will be recommended.        If you are having difficulty wearing your compression it is important to notify your care provider so that other options may be reviewed.     Please call us if you have any questions [phone].     Thank you for choosing  [facility].

## 2021-07-05 ENCOUNTER — RECORDS - HEALTHEAST (OUTPATIENT)
Dept: ADMINISTRATIVE | Facility: OTHER | Age: 76
End: 2021-07-05

## 2021-07-05 ENCOUNTER — COMMUNICATION - HEALTHEAST (OUTPATIENT)
Dept: SCHEDULING | Facility: CLINIC | Age: 76
End: 2021-07-05

## 2021-07-05 ENCOUNTER — AMBULATORY - HEALTHEAST (OUTPATIENT)
Dept: VASCULAR SURGERY | Facility: CLINIC | Age: 76
End: 2021-07-05

## 2021-07-05 DIAGNOSIS — L08.9 WOUND INFECTION: ICD-10-CM

## 2021-07-05 DIAGNOSIS — T14.8XXA WOUND INFECTION: ICD-10-CM

## 2021-07-05 RX ORDER — CITALOPRAM HYDROBROMIDE 40 MG/1
40 TABLET ORAL
Status: SHIPPED | COMMUNITY
Start: 2021-07-05

## 2021-07-06 VITALS — DIASTOLIC BLOOD PRESSURE: 76 MMHG | HEART RATE: 84 BPM | SYSTOLIC BLOOD PRESSURE: 122 MMHG | TEMPERATURE: 98.6 F

## 2021-07-06 VITALS
TEMPERATURE: 97.6 F | RESPIRATION RATE: 18 BRPM | DIASTOLIC BLOOD PRESSURE: 76 MMHG | SYSTOLIC BLOOD PRESSURE: 124 MMHG | HEART RATE: 78 BPM

## 2021-07-06 VITALS
HEART RATE: 78 BPM | SYSTOLIC BLOOD PRESSURE: 130 MMHG | TEMPERATURE: 98.2 F | RESPIRATION RATE: 18 BRPM | DIASTOLIC BLOOD PRESSURE: 62 MMHG

## 2021-07-06 VITALS
RESPIRATION RATE: 12 BRPM | DIASTOLIC BLOOD PRESSURE: 60 MMHG | SYSTOLIC BLOOD PRESSURE: 120 MMHG | RESPIRATION RATE: 16 BRPM | TEMPERATURE: 98.7 F | HEART RATE: 76 BPM | DIASTOLIC BLOOD PRESSURE: 68 MMHG | SYSTOLIC BLOOD PRESSURE: 130 MMHG | HEART RATE: 76 BPM | TEMPERATURE: 98.4 F

## 2021-07-06 VITALS
TEMPERATURE: 97.9 F | DIASTOLIC BLOOD PRESSURE: 60 MMHG | SYSTOLIC BLOOD PRESSURE: 110 MMHG | RESPIRATION RATE: 12 BRPM | HEART RATE: 72 BPM

## 2021-07-06 VITALS — HEART RATE: 80 BPM | TEMPERATURE: 98.3 F

## 2021-07-06 VITALS
DIASTOLIC BLOOD PRESSURE: 66 MMHG | HEART RATE: 72 BPM | TEMPERATURE: 98 F | SYSTOLIC BLOOD PRESSURE: 114 MMHG | RESPIRATION RATE: 16 BRPM

## 2021-07-06 VITALS
SYSTOLIC BLOOD PRESSURE: 110 MMHG | DIASTOLIC BLOOD PRESSURE: 60 MMHG | HEART RATE: 72 BPM | TEMPERATURE: 98.6 F | RESPIRATION RATE: 12 BRPM

## 2021-07-06 VITALS
HEART RATE: 76 BPM | RESPIRATION RATE: 18 BRPM | TEMPERATURE: 98 F | SYSTOLIC BLOOD PRESSURE: 124 MMHG | DIASTOLIC BLOOD PRESSURE: 70 MMHG

## 2021-07-06 NOTE — TELEPHONE ENCOUNTER
Telephone Encounter by Nehemiah Concepcion RN at 7/5/2021  8:51 PM     Author: Nehemiah Concepcion RN Service: -- Author Type: Registered Nurse    Filed: 7/5/2021  9:31 PM Encounter Date: 7/5/2021 Status: Signed    : Nehemiah Concepcion RN (Registered Nurse)       Pt called stating she had a new procedure on her right leg. Pt reported she has open wound on her right leg, and she was seen in clinic today for wound check and compression rap was done up to her knees. Pt is reported she is having sever pain and thinks the compression raping are too tight. Pt rated her pain level 9/10 and said is has been getting worse since three pm today, and now the pain is constant. Pt reported her taos feel numb, and the bottom of the foot feels it is on fire.    Per protocal RN paged on call Vascular surgery provider,Dr Bates, to call pt back at [phone] for second level triage. Pt was advised to call pack if she doesn't get a call back within 20 minutes, pt stated okay.     RN called pt back and she stated she did receive a return call from the above provider.      Nehemiah Concepcion RN  [facility]    COVID 19 Nurse Triage Plan/Patient Instructions    Please be aware that novel coronavirus (COVID-19) may be circulating in the community. If you develop symptoms such as fever, cough, or SOB or if you have concerns about the presence of another infection including coronavirus (COVID-19), please contact your health care provider or visit  [email or web address].    Disposition/Instructions    ED Visit recommended. Follow protocol based instructions.      Bring Your Own Device:  Please also bring your smart device(s) (smart phones, tablets, laptops) and their charging cables for your personal use and to communicate with your care team during your visit.      Thank you for taking steps to prevent the spread of this virus.  o Limit your contact with others.  o Wear a simple mask to cover your cough.  o Wash  your hands well and often.    Resources    [facility]: About COVID-19: [email or web address]    CDC: What to Do If You're Sick: www.cdc.gov/coronavirus/2019-ncov/about/steps-when-sick.html    CDC: Ending Home Isolation: www.cdc.gov/coronavirus/2019-ncov/hcp/disposition-in-home-patients.html     CDC: Caring for Someone: www.cdc.gov/coronavirus/2019-ncov/if-you-are-sick/care-for-someone.html     [facility]: Interim Guidance for Hospital Discharge to Home: www.[email or web address].[facility].[place]/diseases/coronavirus/hcp/hospdischarge.pdf    [facility] clinical trials (COVID-19 research studies): [email or web address]-clinical-trials     Below are the COVID-19 hotlines at the [place] Department of Health ([facility]). Interpreters are available.   o For health questions: Call 790-974-5725 or 1-780.356.1622 (7 a.m. to 7 p.m.)  o For questions about schools and childcare: Call 841-844-7994 or 1-260.444.4924 (7 a.m. to 7 p.m.)                 Reason for Disposition  ? Patient sounds very sick or weak to the triager    Additional Information  ? Negative: Sounds like a life-threatening emergency to the triager  ? Negative: Chest pain  ? Negative: Difficulty breathing  ? Negative: Acting confused (e.g., disoriented, slurred speech) or excessively sleepy  ? Negative: Surgical incision symptoms and questions  ? Negative: [1] Discomfort (pain, burning or stinging) when passing urine AND [2] male  ? Negative: [1] Discomfort (pain, burning or stinging) when passing urine AND [2] female  ? Negative: Constipation  ? Negative: New or worsening leg (calf, thigh) pain  ? Negative: New or worsening leg swelling  ? Negative: Dizziness is severe, or persists > 24 hours after surgery  ? Negative: Pain, redness, swelling, or pus at IV Site  ? Negative: Symptoms arising from use of a urinary catheter (Amato or Coude)  ? Negative: Cast problems or questions  ? Negative: Medication question  ? Negative: [1] Widespread rash AND [2]  bright red, sunburn-like  ? Negative: [1] SEVERE headache AND [2] after spinal (epidural) anesthesia  ? Negative: [1] Vomiting AND [2] persists > 4 hours  ? Negative: [1] Vomiting AND [2] abdomen looks much more swollen than usual  ? Negative: [1] Drinking very little AND [2] dehydration suspected (e.g., no urine > 12 hours, very dry mouth, very lightheaded)    Protocols used: POST-OP SYMPTOMS AND [id number]

## 2021-07-08 ENCOUNTER — AMBULATORY - HEALTHEAST (OUTPATIENT)
Dept: VASCULAR SURGERY | Facility: CLINIC | Age: 76
End: 2021-07-08

## 2021-07-08 DIAGNOSIS — L97.912 CHRONIC ULCER OF RIGHT LEG, WITH FAT LAYER EXPOSED (H): ICD-10-CM

## 2021-07-08 DIAGNOSIS — L08.9 WOUND INFECTION: ICD-10-CM

## 2021-07-08 DIAGNOSIS — T14.8XXA WOUND INFECTION: ICD-10-CM

## 2021-07-08 DIAGNOSIS — I87.2 VENOUS INSUFFICIENCY OF BOTH LOWER EXTREMITIES: ICD-10-CM

## 2021-07-08 DIAGNOSIS — M79.661 PAIN OF RIGHT LOWER LEG: ICD-10-CM

## 2021-07-08 DIAGNOSIS — I89.0 ACQUIRED LYMPHEDEMA OF LEG: ICD-10-CM

## 2021-07-08 RX ORDER — CLINDAMYCIN HCL 300 MG
300 CAPSULE ORAL 3 TIMES DAILY
Qty: 21 CAPSULE | Refills: 0 | Status: SHIPPED | OUTPATIENT
Start: 2021-07-08 | End: 2021-07-15

## 2021-07-09 ENCOUNTER — COMMUNICATION - HEALTHEAST (OUTPATIENT)
Dept: VASCULAR SURGERY | Facility: CLINIC | Age: 76
End: 2021-07-09

## 2021-07-09 DIAGNOSIS — L97.911 SKIN ULCER OF RIGHT LOWER LEG, LIMITED TO BREAKDOWN OF SKIN (H): Primary | ICD-10-CM

## 2021-07-09 RX ORDER — LIDOCAINE HYDROCHLORIDE 20 MG/ML
JELLY TOPICAL PRN
Status: DISCONTINUED | COMMUNITY
Start: 2021-07-09 | End: 2021-07-21

## 2021-07-09 NOTE — TELEPHONE ENCOUNTER
Telephone Encounter by Karyna Kelley RN at 7/9/2021  8:32 AM     Author: Karyna Kelley RN Service: -- Author Type: Registered Nurse    Filed: 7/9/2021  8:32 AM Encounter Date: 7/9/2021 Status: Signed    : Karyna Kelley RN (Registered Nurse)       ----- Message from Rima Melendez MD sent at 7/8/2021  4:55 PM CDT -----  Please let patient know that based on her wound culture I would like her to also start oral antibiotics.  Prescription has been sent to her pharmacy.

## 2021-07-09 NOTE — TELEPHONE ENCOUNTER
Telephone Encounter by Karyna Kelley RN at 7/9/2021  8:32 AM     Author: Karyna Kelley RN Service: -- Author Type: Registered Nurse    Filed: 7/9/2021  8:32 AM Encounter Date: 7/9/2021 Status: Signed    : Karyna Kelley RN (Registered Nurse)       Pt was updated, instructions below discussed. She started the ABX last night.     clindamycin (CLEOCIN) 300 MG capsule 21 capsule 0 7/8/2021 7/15/2021 --   Sig - Route: Take 1 capsule (300 mg total) by mouth 3 (three) times a day for 7 days. - Oral

## 2021-07-11 PROBLEM — E66.01 MORBID OBESITY (H): Status: ACTIVE | Noted: 2021-07-05

## 2021-07-11 PROBLEM — Z78.0 POSTMENOPAUSAL: Status: ACTIVE | Noted: 2021-07-05

## 2021-07-11 PROBLEM — N95.9 MENOPAUSAL AND POSTMENOPAUSAL DISORDER: Status: ACTIVE | Noted: 2021-07-05

## 2021-07-11 PROBLEM — G25.81 RESTLESS LEGS: Status: ACTIVE | Noted: 2021-07-05

## 2021-07-11 PROBLEM — F32.5 MAJOR DEPRESSION, SINGLE EPISODE, IN COMPLETE REMISSION (H): Status: ACTIVE | Noted: 2021-07-05

## 2021-07-11 PROBLEM — R60.9 EDEMA: Status: ACTIVE | Noted: 2021-07-05

## 2021-07-11 PROBLEM — I87.2 PERIPHERAL VENOUS INSUFFICIENCY: Status: ACTIVE | Noted: 2021-07-05

## 2021-07-11 PROBLEM — Z85.820 HISTORY OF MALIGNANT MELANOMA OF SKIN: Status: ACTIVE | Noted: 2021-07-05

## 2021-07-11 PROBLEM — E78.2 MIXED HYPERLIPIDEMIA: Status: ACTIVE | Noted: 2021-07-05

## 2021-07-13 ENCOUNTER — RECORDS - HEALTHEAST (OUTPATIENT)
Dept: ADMINISTRATIVE | Facility: CLINIC | Age: 76
End: 2021-07-13

## 2021-07-14 ENCOUNTER — ALLIED HEALTH/NURSE VISIT (OUTPATIENT)
Dept: VASCULAR SURGERY | Facility: CLINIC | Age: 76
End: 2021-07-14
Payer: COMMERCIAL

## 2021-07-14 VITALS — SYSTOLIC BLOOD PRESSURE: 114 MMHG | DIASTOLIC BLOOD PRESSURE: 58 MMHG

## 2021-07-14 DIAGNOSIS — I87.303 VENOUS HYPERTENSION OF LOWER EXTREMITY, BILATERAL: ICD-10-CM

## 2021-07-14 DIAGNOSIS — E66.01 MORBID OBESITY (H): ICD-10-CM

## 2021-07-14 DIAGNOSIS — I89.0 ACQUIRED LYMPHEDEMA OF LEG: ICD-10-CM

## 2021-07-14 PROCEDURE — 97602 WOUND(S) CARE NON-SELECTIVE: CPT

## 2021-07-14 NOTE — PROGRESS NOTES
Right medial leg 7/14        Right leg 7/14                Nurse Visit    Chief Complaint: Patient presents to clinic for assessment and treatment of their right lower leg wounds/weeping and bilateral leg swelling    Dressing on Arrival: adaptic and baby diapers    Patient came in today for dressing change and dressing rewrap per order from Maria G Yeboah CNP. Patient rated pain 5 out of 10. Removed dressings and cleansed skin with soap and cleaned wound bed with normal saline. Applied lotion to intact skin.  Applied Aquacel Ag Advantage to wound and covered with baby diapers. Patient tolerated dressing change well. [place] has still not received her Hydrofera Blue that was ordered on 7/9. Sent email correspondence to Ophelia Lopez and [facility] to inquire on status.    Allergies:   Allergies   Allergen Reactions     Other Environmental Allergy Anaphylaxis     Bees/Wasps Stings     Adhesive [Mecrylate] Unknown     Other reaction(s): sores     Vicodin [Hydrocodone-Acetaminophen] Nausea and Vomiting and Rash       Medications:   Current Outpatient Medications:      aspirin 81 mg chewable tablet, [ASPIRIN 81 MG CHEWABLE TABLET] Chew 81 mg daily., Disp: , Rfl:      buPROPion (WELLBUTRIN SR) 150 MG 12 hr tablet, [BUPROPION (WELLBUTRIN SR) 150 MG 12 HR TABLET] , Disp: , Rfl:      buPROPion (WELLBUTRIN XL) 150 MG 24 hr tablet, [BUPROPION (WELLBUTRIN XL) 150 MG 24 HR TABLET] Take 150 mg by mouth daily., Disp: , Rfl:      cholecalciferol, vitamin D3, 1,000 unit tablet, [CHOLECALCIFEROL, VITAMIN D3, 1,000 UNIT TABLET] Take 2,000 Units by mouth daily., Disp: , Rfl:      citalopram (CELEXA) 40 MG tablet, [CITALOPRAM (CELEXA) 40 MG TABLET] Take 1 tablet by mouth daily., Disp: , Rfl:      clindamycin (CLEOCIN) 300 MG capsule, [CLINDAMYCIN (CLEOCIN) 300 MG CAPSULE] Take 1 capsule (300 mg total) by mouth 3 (three) times a day for 7 days., Disp: 21 capsule, Rfl: 0     furosemide (LASIX) 20 MG tablet, [FUROSEMIDE (LASIX) 20 MG TABLET] ,  Disp: , Rfl:      levoFLOXacin (LEVAQUIN) 500 MG tablet, [LEVOFLOXACIN (LEVAQUIN) 500 MG TABLET] Take 1 tablet (500 mg total) by mouth daily for 10 days., Disp: 10 tablet, Rfl: 0     multivitamin therapeutic (THERAGRAN) tablet, [MULTIVITAMIN THERAPEUTIC (THERAGRAN) TABLET] Take 1 tablet by mouth daily., Disp: , Rfl:      pantoprazole (PROTONIX) 40 MG tablet, [PANTOPRAZOLE (PROTONIX) 40 MG TABLET] Take 40 mg by mouth daily., Disp: , Rfl:      potassium chloride SA (K-DUR,KLOR-CON) 20 MEQ tablet, [POTASSIUM CHLORIDE SA (K-DUR,KLOR-CON) 20 MEQ TABLET] , Disp: , Rfl:      rOPINIRole (REQUIP) 1 MG tablet, [ROPINIROLE (REQUIP) 1 MG TABLET] Take 1 mg by mouth 2 (two) times a day., Disp: , Rfl:      simvastatin (ZOCOR) 40 MG tablet, [SIMVASTATIN (ZOCOR) 40 MG TABLET] Take 40 mg by mouth bedtime., Disp: , Rfl:      spironolactone (ALDACTONE) 25 MG tablet, [SPIRONOLACTONE (ALDACTONE) 25 MG TABLET] Take 25 mg by mouth daily., Disp: , Rfl:     Current Facility-Administered Medications:      lidocaine (XYLOCAINE) 2 % external gel, , Topical, PRN, Maria G Yeboah, LAXMI CNP    Vital Signs: /58 (BP Location: Left arm, Patient Position: Sitting, Cuff Size: Adult Large)       Assessment:    General:  Patient presents to clinic in no apparent distress.  Psychiatric:  Alert and oriented .   Lower extremity:  edema is present.    Integumentary:  Skin is uniformly warm, dry and pink.    Wound size:   VASC Wound right lower leg (Active)   Pre Size Length 20 07/14/21 1500   Pre Size Width 25 07/14/21 1500   Pre Size Depth 0.1 07/14/21 1500   Pre Total Sq cm 500 07/14/21 1500      Undermining is not present.    The periwoundskin is pink with some areas of maceration        Plan:         1. Patient will follow up on Friday for nurse visit/dressing change         2. Treatment provided will include irrigation and dressings to promote autolytic debridement and will be as listed below     Cleansed with: Dilute  Hibiclens    Protected skin with: Remedy Skin Repair (foot and intact skin)    Dressings Applied: Aquacel Ag Advantage    Compression Applied to the Left Leg: Dermafit      Tubular compression was applied from base of toes to just below the bend of knee    Compression Applied to the Right Leg: Dermafit    Tubular compression was applied from base of toes to just below the bend of knee    Offloading applied: None  Trial Products: no  Provider notified regarding concerns: no  Treatment Changes: no    Educational Barriers: None  Taught Regarding: compression, dressing, supplies, infection, elevation  Teaching Method:

## 2021-07-14 NOTE — PATIENT INSTRUCTIONS
Wound Care Instructions     RIGHT LOWER LEG:  Primary Dressing: UNTIL THE HYDROFERA BLUE READY ARRIVES: Apply Aquacel Ag Advantage over the wounds.    ONCE THE HYDROFERA BLUE READY ARRIVES: apply entire piece of Hydrofera Blue Ready Transfer to the right leg     Secondary dressing: Cover with baby diapers, size 5, with leg gussets removed.     Change baby diapers daily and as needed.      Secure with rolled gauze and Coban or size G tubigrip.        SEEK MEDICAL CARE IF:    You have an increase in swelling, pain, or redness around the wound.    You have an increase in the amount of pus coming from the wound.    There is a bad smell coming from the wound.    The wound appears to be worsening/enlarging    You have a fever greater than 101.5 F        When at home please add the foot piece of tubular compression (size F). It is best to have compression on your foot as well as your leg.    [employer]  Phone [phone]  Fax [phone]  [email or web address]    To reorder [employer] supplies call [phone]     or Concerns Contact:     [email or web address]

## 2021-07-16 ENCOUNTER — ALLIED HEALTH/NURSE VISIT (OUTPATIENT)
Dept: VASCULAR SURGERY | Facility: CLINIC | Age: 76
End: 2021-07-16
Payer: COMMERCIAL

## 2021-07-16 VITALS — HEART RATE: 88 BPM | SYSTOLIC BLOOD PRESSURE: 128 MMHG | DIASTOLIC BLOOD PRESSURE: 64 MMHG | TEMPERATURE: 98.5 F

## 2021-07-16 DIAGNOSIS — L97.912 CHRONIC ULCER OF RIGHT LEG, WITH FAT LAYER EXPOSED (H): ICD-10-CM

## 2021-07-16 DIAGNOSIS — I89.0 ACQUIRED LYMPHEDEMA OF LEG: Primary | ICD-10-CM

## 2021-07-16 PROCEDURE — 97602 WOUND(S) CARE NON-SELECTIVE: CPT

## 2021-07-16 RX ORDER — TRAMADOL HYDROCHLORIDE 50 MG/1
50 TABLET ORAL EVERY 6 HOURS PRN
Status: ON HOLD | COMMUNITY
End: 2021-07-27

## 2021-07-16 RX ORDER — KETOROLAC TROMETHAMINE 10 MG/1
10 TABLET, FILM COATED ORAL EVERY 6 HOURS PRN
COMMUNITY
End: 2021-07-21

## 2021-07-16 ASSESSMENT — PAIN SCALES - GENERAL: PAINLEVEL: MODERATE PAIN (4)

## 2021-07-16 NOTE — NURSING NOTE
[facility]  -  Nurse Visit          Chief Complaint: Patient presents to clinic for assement, and treatment of their wound(s) and swelling.     Dressing on Arrival: aquacel ag and baby diapers, which were completely saturated.    No diagnosis found.      Allergies:   Allergies   Allergen Reactions     Other Environmental Allergy Anaphylaxis     Bees/Wasps Stings     Adhesive [Mecrylate] Unknown     Other reaction(s): sores     Vicodin [Hydrocodone-Acetaminophen] Nausea and Vomiting and Rash       Medications:   Current Outpatient Medications:      aspirin 81 mg chewable tablet, [ASPIRIN 81 MG CHEWABLE TABLET] Chew 81 mg daily., Disp: , Rfl:      buPROPion (WELLBUTRIN SR) 150 MG 12 hr tablet, [BUPROPION (WELLBUTRIN SR) 150 MG 12 HR TABLET] , Disp: , Rfl:      buPROPion (WELLBUTRIN XL) 150 MG 24 hr tablet, [BUPROPION (WELLBUTRIN XL) 150 MG 24 HR TABLET] Take 150 mg by mouth daily., Disp: , Rfl:      cholecalciferol, vitamin D3, 1,000 unit tablet, [CHOLECALCIFEROL, VITAMIN D3, 1,000 UNIT TABLET] Take 2,000 Units by mouth daily., Disp: , Rfl:      citalopram (CELEXA) 40 MG tablet, [CITALOPRAM (CELEXA) 40 MG TABLET] Take 1 tablet by mouth daily., Disp: , Rfl:      furosemide (LASIX) 20 MG tablet, [FUROSEMIDE (LASIX) 20 MG TABLET] , Disp: , Rfl:      ketorolac (TORADOL) 10 MG tablet, Take 10 mg by mouth every 6 hours as needed for moderate pain, Disp: , Rfl:      levoFLOXacin (LEVAQUIN) 500 MG tablet, [LEVOFLOXACIN (LEVAQUIN) 500 MG TABLET] Take 1 tablet (500 mg total) by mouth daily for 10 days., Disp: 10 tablet, Rfl: 0     multivitamin therapeutic (THERAGRAN) tablet, [MULTIVITAMIN THERAPEUTIC (THERAGRAN) TABLET] Take 1 tablet by mouth daily., Disp: , Rfl:      pantoprazole (PROTONIX) 40 MG tablet, [PANTOPRAZOLE (PROTONIX) 40 MG TABLET] Take 40 mg by mouth daily., Disp: , Rfl:      potassium chloride SA (K-DUR,KLOR-CON) 20 MEQ tablet, [POTASSIUM CHLORIDE SA (K-DUR,KLOR-CON) 20 MEQ TABLET] , Disp: ,  Rfl:      rOPINIRole (REQUIP) 1 MG tablet, [ROPINIROLE (REQUIP) 1 MG TABLET] Take 1 mg by mouth 2 (two) times a day., Disp: , Rfl:      simvastatin (ZOCOR) 40 MG tablet, [SIMVASTATIN (ZOCOR) 40 MG TABLET] Take 40 mg by mouth bedtime., Disp: , Rfl:      spironolactone (ALDACTONE) 25 MG tablet, [SPIRONOLACTONE (ALDACTONE) 25 MG TABLET] Take 25 mg by mouth daily., Disp: , Rfl:      traMADol (ULTRAM) 50 MG tablet, Take 50 mg by mouth every 6 hours as needed for severe pain, Disp: , Rfl:     Current Facility-Administered Medications:      lidocaine (XYLOCAINE) 2 % external gel, , Topical, PRN, Maria G Yeboah APRN CNP    Vital Signs: /64   Pulse 88   Temp 98.5  F (36.9  C)         Assessment:    General:  Patient presents to clinic in no apparent distress.  Psychiatric:  Alert and oriented x3.   Lower extremity:  edema is present.    Integumentary:  Skin is red    Wound info:  VASC Wound right lower leg (Active)   Pre Size Length 20 07/16/21 1400   Pre Size Width 25 07/16/21 1400   Pre Size Depth 0.2 07/16/21 1400   Pre Total Sq cm 500 07/16/21 1400       Undermining is not present.    The periwoundskin is red    Plan:         1. Patient will follow up on 7/19/2021.          2. Treatment provided will include irrigation and dressings to promote autolytic debridement and will be as listed below     Cleansed with: Normal saline    Protected skin with: NA    Dressings Applied: aquacel ag and baby diapers    Compression Applied to the Left Leg: None    Compression Applied to the Right Leg: Tubular compression    Offloading applied: None    Trial Products: No  Provider notified regarding concerns: Yes: patient c/o itching and hives that started approximately a week ago. Maria G Yeboah CNP, requested she stop the antibiotic and continue using tylenol to manage symptoms.  Treatment Changes: No        Marie Connolly

## 2021-07-16 NOTE — PATIENT INSTRUCTIONS
Please stop taking the antibiotic. Continue taking tylenol to help with itching. Continue current wound care orders. We will contact you as soon as we are able to schedule an appointment with Dr. Melendez.

## 2021-07-19 ENCOUNTER — ALLIED HEALTH/NURSE VISIT (OUTPATIENT)
Dept: VASCULAR SURGERY | Facility: CLINIC | Age: 76
End: 2021-07-19
Payer: COMMERCIAL

## 2021-07-19 VITALS — HEART RATE: 84 BPM | SYSTOLIC BLOOD PRESSURE: 138 MMHG | DIASTOLIC BLOOD PRESSURE: 84 MMHG | TEMPERATURE: 98.5 F

## 2021-07-19 DIAGNOSIS — L97.912 CHRONIC ULCER OF RIGHT LEG, WITH FAT LAYER EXPOSED (H): Primary | ICD-10-CM

## 2021-07-19 PROCEDURE — 97602 WOUND(S) CARE NON-SELECTIVE: CPT

## 2021-07-19 PROCEDURE — 29581 APPL MULTLAYER CMPRN SYS LEG: CPT

## 2021-07-19 RX ORDER — NAPROXEN SODIUM 220 MG
440 TABLET ORAL DAILY PRN
Status: ON HOLD | COMMUNITY
End: 2022-03-29

## 2021-07-19 RX ORDER — PREDNISONE 20 MG/1
TABLET ORAL
COMMUNITY
Start: 2020-11-19 | End: 2021-07-21

## 2021-07-19 ASSESSMENT — PAIN SCALES - GENERAL: PAINLEVEL: SEVERE PAIN (6)

## 2021-07-19 NOTE — NURSING NOTE
[facility]  -  Nurse Visit          Chief Complaint: Patient presents to clinic for assement, and treatment of their wound(s) and swelling.     Dressing on Arrival: G tubular compression, baby diapers, adaptic (patient applied)      ICD-10-CM    1. Chronic ulcer of right leg, with fat layer exposed (H)  L97.912          Allergies:   Allergies   Allergen Reactions     Other Environmental Allergy Anaphylaxis     Bees/Wasps Stings     Adhesive [Mecrylate] Unknown     Other reaction(s): sores     Vicodin [Hydrocodone-Acetaminophen] Nausea and Vomiting and Rash       Medications:   Current Outpatient Medications:      predniSONE (DELTASONE) 20 MG tablet, 2 qd for 3 days and 1 qd for 4 days, Disp: , Rfl:      aspirin 81 mg chewable tablet, [ASPIRIN 81 MG CHEWABLE TABLET] Chew 81 mg daily., Disp: , Rfl:      buPROPion (WELLBUTRIN SR) 150 MG 12 hr tablet, [BUPROPION (WELLBUTRIN SR) 150 MG 12 HR TABLET] , Disp: , Rfl:      buPROPion (WELLBUTRIN XL) 150 MG 24 hr tablet, [BUPROPION (WELLBUTRIN XL) 150 MG 24 HR TABLET] Take 150 mg by mouth daily., Disp: , Rfl:      cholecalciferol, vitamin D3, 1,000 unit tablet, [CHOLECALCIFEROL, VITAMIN D3, 1,000 UNIT TABLET] Take 2,000 Units by mouth daily., Disp: , Rfl:      citalopram (CELEXA) 40 MG tablet, [CITALOPRAM (CELEXA) 40 MG TABLET] Take 1 tablet by mouth daily., Disp: , Rfl:      furosemide (LASIX) 20 MG tablet, [FUROSEMIDE (LASIX) 20 MG TABLET] , Disp: , Rfl:      ketorolac (TORADOL) 10 MG tablet, Take 10 mg by mouth every 6 hours as needed for moderate pain, Disp: , Rfl:      multivitamin therapeutic (THERAGRAN) tablet, [MULTIVITAMIN THERAPEUTIC (THERAGRAN) TABLET] Take 1 tablet by mouth daily., Disp: , Rfl:      naproxen sodium (ALEVE) 220 MG tablet, 1 tab(s), Disp: , Rfl:      pantoprazole (PROTONIX) 40 MG tablet, [PANTOPRAZOLE (PROTONIX) 40 MG TABLET] Take 40 mg by mouth daily., Disp: , Rfl:      potassium chloride SA (K-DUR,KLOR-CON) 20 MEQ tablet,  [POTASSIUM CHLORIDE SA (K-DUR,KLOR-CON) 20 MEQ TABLET] , Disp: , Rfl:      rOPINIRole (REQUIP) 1 MG tablet, [ROPINIROLE (REQUIP) 1 MG TABLET] Take 1 mg by mouth 2 (two) times a day., Disp: , Rfl:      simvastatin (ZOCOR) 40 MG tablet, [SIMVASTATIN (ZOCOR) 40 MG TABLET] Take 40 mg by mouth bedtime., Disp: , Rfl:      spironolactone (ALDACTONE) 25 MG tablet, [SPIRONOLACTONE (ALDACTONE) 25 MG TABLET] Take 25 mg by mouth daily., Disp: , Rfl:      traMADol (ULTRAM) 50 MG tablet, Take 50 mg by mouth every 6 hours as needed for severe pain, Disp: , Rfl:     Current Facility-Administered Medications:      lidocaine (XYLOCAINE) 2 % external gel, , Topical, PRN, Maria G Yeboah, LAXMI CNP    Vital Signs: /84   Pulse 84   Temp 98.5  F (36.9  C)         Assessment:    General:  Patient presents to clinic in no apparent distress.  Psychiatric:  Alert and oriented x3.   Lower extremity:  edema is present.    Integumentary:  Skin is red, macerated, warm    Wound info:  VASC Wound right lower leg (Active)   Pre Size Length 20 07/19/21 1300   Pre Size Width 20.5 07/19/21 1300   Pre Size Depth 0.2 07/19/21 1300   Pre Total Sq cm 500 07/19/21 1300       Undermining is not present.    The periwoundskin is maceration, warmth and red    Plan:         1. Patient will follow up in two days.         2. Treatment provided will include irrigation and dressings to promote autolytic debridement and will be as listed below     Cleansed with: Normal saline    Protected skin with: NA    Dressings Applied: hydrofera blue, baby diapers (five), roll gauze    Compression Applied to the Left Leg: None    Compression Applied to the Right Leg: Tubular compression    Offloading applied: None    Trial Products: No  Provider notified regarding concerns: Yes: Dr. Melendez notified of extreme pain patient experiences with dressing changes. Reviewed chart and recommended patient take one of the already prescribed tramadol 30 minutes prior to  her dressing changes.   Treatment Changes: Yes: Baby diapers applied but Dr. Melendez decided it would be best to stop the baby diapers and begin ABD pads, because the baby diapers may be holding in some heat and the scent of the baby diapers may be irritating her skin.        Marie Connolly

## 2021-07-19 NOTE — PATIENT INSTRUCTIONS
Please take one tramadol 30 minutes prior to dressing changes.       Wound Care Instructions     RIGHT LOWER LEG:  Primary Dressing: UNTIL THE HYDROFERA BLUE READY ARRIVES: Apply Aquacel Ag Advantage over the wounds.    ONCE THE HYDROFERA BLUE READY ARRIVES: apply entire piece of Hydrofera Blue Ready Transfer to the right leg     Secondary dressing: Cover with baby diapers, size 5, with leg gussets removed.     Change baby diapers daily and as needed.      Secure with rolled gauze and Coban or size G tubigrip.        SEEK MEDICAL CARE IF:    You have an increase in swelling, pain, or redness around the wound.    You have an increase in the amount of pus coming from the wound.    There is a bad smell coming from the wound.    The wound appears to be worsening/enlarging    You have a fever greater than 101.5 F        When at home please add the foot piece of tubular compression (size F). It is best to have compression on your foot as well as your leg.     Return to clinic on Thursday for appointment with Maria G Yeboah CNP        Swelling and Compression Therapy     Swelling in the legs can be caused by many reasons. No matter what the reason, treatment usually includes some type of compression. This may be done with a support sock, dressing, ace wrap, or layered wraps.      It is important to treat the swelling for many reasons. If the swelling is not treated you may develop blisters that can lead to ulcerations. This is caused when extra fluid goes into tissue causing damage and blocking blood flow to the tissue.      It is important that you wear your compression every day, including days that you will be seen in clinic.      Compression is often the most important part of treating leg wounds. Without controlling the swelling it is often not possible to heal wounds.      Going without compression for even brief periods of time can be damaging to your legs and your health.  Your compression should be put on first  thing in the morning. Take the compression off at night only when instructed by your care provider to do so. Sometimes wearing compression 24 hours a day will be recommended.        If you are having difficulty wearing your compression it is important to notify your care provider so that other options may be reviewed.     Please call us if you have any questions [phone].     Thank you for choosing  [facility].    Patient Education     Tramadol HCl Oral Tablet 50 mg  Uses  For pain.  Instructions  This medicine may be taken with or without food.  Keep the medicine at room temperature. Avoid heat and direct light.  To reduce constipation, eat high fiber foods, drink plenty of water and exercise.  If you forget to take a dose on time, take it as soon as you remember. If it is almost time for the next dose, do not take the missed dose. Return to your normal dosing schedule. Do not take 2 doses of this medicine at one time.  Please tell your doctor and pharmacist about all the medicines you take. Include both prescription and over-the-counter medicines. Also tell them about any vitamins, herbal medicines, or anything else you take for your health.  Cautions  Ask your doctor or pharmacist if you should have naloxone on hand to treat opioid overdose. Teach your family or household members about the signs of an opioid overdose and how to treat it.  This medicine can be habit-forming. If you use this medicine regularly for a long time, it can lead to withdrawal symptoms when you stop. Please use this medicine only as directed.  Tell your doctor and pharmacist if you ever had an allergic reaction to a medicine. Symptoms of an allergic reaction can include trouble breathing, skin rash, itching, swelling, or severe dizziness.  Do not use the medication any more than instructed.  If possible, avoid using with marijuana or other medicines that can cause dizziness or drowsiness. These include allergy/cold products,  muscle relaxers, sleep aids, and pain relievers.  Your ability to stay alert or to react quickly may be impaired by this medicine. Do not drive or operate machinery until you know how this medicine will affect you.  Do not drink beverages with alcohol while on this medicine.  Tell the doctor or pharmacist if you are pregnant, planning to be pregnant, or breastfeeding.  Do not breastfeed while on this medicine.  This medicine can hurt a new baby in the womb. If you become pregnant while on this medicine, tell your doctor immediately. Your doctor may switch you to a different medicine.  Ask your pharmacist if this medicine can interact with any of your other medicines. Be sure to tell them about all the medicines you take.  Do not start or stop any other medicines without first speaking to your doctor or pharmacist.  Call your doctor right away if you notice slow or shallow breathing.  Do not share this medicine with anyone who has not been prescribed this medicine.  This medicine can cause serious side effects in some patients. Important information from the [place] Food and Drug Administration (FDA) is available from your pharmacist. Please review it carefully with your pharmacist to understand the risks associated with this medicine.  Side Effects  The following is a list of some common side effects from this medicine. Please speak with your doctor about what you should do if you experience these or other side effects.    constipation    dizziness    drowsiness or sedation    nausea    vomiting    weight loss  Call your doctor or get medical help right away if you notice any of these more serious side effects:    agitated feeling or trouble sleeping    decreased appetite    breathing interruption during sleep    shallow, irregular breathing    changes in memory, mood, or thinking    fainting    lack of energy and tiredness    fast or irregular heart beats    stomach upset or abdominal pain    difficulty or discomfort  urinating  A few people may have an allergic reactions to this medicine. Symptoms can include difficulty breathing, skin rash, itching, swelling, or severe dizziness. If you notice any of these symptoms, seek medical help quickly.  Extra  Please speak with your doctor, nurse, or pharmacist if you have any questions about this medicine.  [email or web address]  IMPORTANT NOTE: This document tells you briefly how to take your medicine, but it does not tell you all there is to know about it.Your doctor or pharmacist may give you other documents about your medicine. Please talk to them if you have any questions.Always follow their advice. There is a more complete description of this medicine available in English.Scan this code on your smartphone or tablet or use the web address below. You can also ask your pharmacist for a printout. If you have any questions, please ask your pharmacist.     2021 [employer].

## 2021-07-20 ENCOUNTER — HOSPITAL ENCOUNTER (OUTPATIENT)
Dept: PHYSICAL THERAPY | Facility: REHABILITATION | Age: 76
End: 2021-07-20
Payer: COMMERCIAL

## 2021-07-20 DIAGNOSIS — L97.811 VENOUS STASIS ULCER OF OTHER PART OF RIGHT LOWER LEG LIMITED TO BREAKDOWN OF SKIN WITH VARICOSE VEINS (H): ICD-10-CM

## 2021-07-20 DIAGNOSIS — I83.018 VENOUS STASIS ULCER OF OTHER PART OF RIGHT LOWER LEG LIMITED TO BREAKDOWN OF SKIN WITH VARICOSE VEINS (H): ICD-10-CM

## 2021-07-20 DIAGNOSIS — R22.43 LOCALIZED SWELLING OF BOTH LOWER LEGS: ICD-10-CM

## 2021-07-20 DIAGNOSIS — I89.0 LYMPHEDEMA: Primary | ICD-10-CM

## 2021-07-20 PROCEDURE — 97140 MANUAL THERAPY 1/> REGIONS: CPT | Mod: GP | Performed by: PHYSICAL THERAPY ASSISTANT

## 2021-07-21 ENCOUNTER — HOSPITAL ENCOUNTER (EMERGENCY)
Facility: HOSPITAL | Age: 76
End: 2021-07-21
Payer: COMMERCIAL

## 2021-07-21 ENCOUNTER — RECORDS - HEALTHEAST (OUTPATIENT)
Dept: ADMINISTRATIVE | Facility: CLINIC | Age: 76
End: 2021-07-21

## 2021-07-21 ENCOUNTER — ALLIED HEALTH/NURSE VISIT (OUTPATIENT)
Dept: VASCULAR SURGERY | Facility: CLINIC | Age: 76
End: 2021-07-21
Payer: COMMERCIAL

## 2021-07-21 ENCOUNTER — HOSPITAL ENCOUNTER (INPATIENT)
Facility: CLINIC | Age: 76
LOS: 6 days | Discharge: HOME | DRG: 603 | End: 2021-07-27
Attending: EMERGENCY MEDICINE | Admitting: HOSPITALIST
Payer: COMMERCIAL

## 2021-07-21 VITALS
HEART RATE: 84 BPM | WEIGHT: 250.8 LBS | BODY MASS INDEX: 47.39 KG/M2 | RESPIRATION RATE: 17 BRPM | SYSTOLIC BLOOD PRESSURE: 110 MMHG | DIASTOLIC BLOOD PRESSURE: 70 MMHG | TEMPERATURE: 97.5 F

## 2021-07-21 DIAGNOSIS — R26.9 GAIT ABNORMALITY: ICD-10-CM

## 2021-07-21 DIAGNOSIS — G35 MS (MULTIPLE SCLEROSIS) (H): Primary | ICD-10-CM

## 2021-07-21 DIAGNOSIS — L97.912 CHRONIC ULCER OF RIGHT LEG, WITH FAT LAYER EXPOSED (H): Primary | ICD-10-CM

## 2021-07-21 DIAGNOSIS — I89.0 ACQUIRED LYMPHEDEMA OF LEG: ICD-10-CM

## 2021-07-21 DIAGNOSIS — L03.115 CELLULITIS OF RIGHT LOWER EXTREMITY: ICD-10-CM

## 2021-07-21 LAB
ANION GAP SERPL CALCULATED.3IONS-SCNC: 12 MMOL/L (ref 5–18)
BASOPHILS # BLD AUTO: 0.1 10E3/UL (ref 0–0.2)
BASOPHILS NFR BLD AUTO: 1 %
BUN SERPL-MCNC: 23 MG/DL (ref 8–28)
CALCIUM SERPL-MCNC: 9.7 MG/DL (ref 8.5–10.5)
CHLORIDE BLD-SCNC: 103 MMOL/L (ref 98–107)
CO2 SERPL-SCNC: 26 MMOL/L (ref 22–31)
CREAT SERPL-MCNC: 0.82 MG/DL (ref 0.6–1.1)
EOSINOPHIL # BLD AUTO: 0.5 10E3/UL (ref 0–0.7)
EOSINOPHIL NFR BLD AUTO: 6 %
ERYTHROCYTE [DISTWIDTH] IN BLOOD BY AUTOMATED COUNT: 14.7 % (ref 10–15)
GFR SERPL CREATININE-BSD FRML MDRD: 71 ML/MIN/1.73M2
GLUCOSE BLD-MCNC: 91 MG/DL (ref 70–125)
HCT VFR BLD AUTO: 38.6 % (ref 35–47)
HGB BLD-MCNC: 11.8 G/DL (ref 11.7–15.7)
IMM GRANULOCYTES # BLD: 0.1 10E3/UL
IMM GRANULOCYTES NFR BLD: 1 %
LYMPHOCYTES # BLD AUTO: 1.6 10E3/UL (ref 0.8–5.3)
LYMPHOCYTES NFR BLD AUTO: 18 %
MCH RBC QN AUTO: 26.1 PG (ref 26.5–33)
MCHC RBC AUTO-ENTMCNC: 30.6 G/DL (ref 31.5–36.5)
MCV RBC AUTO: 85 FL (ref 78–100)
MONOCYTES # BLD AUTO: 1.1 10E3/UL (ref 0–1.3)
MONOCYTES NFR BLD AUTO: 12 %
NEUTROPHILS # BLD AUTO: 5.6 10E3/UL (ref 1.6–8.3)
NEUTROPHILS NFR BLD AUTO: 62 %
NRBC # BLD AUTO: 0 10E3/UL
NRBC BLD AUTO-RTO: 0 /100
PLATELET # BLD AUTO: 397 10E3/UL (ref 150–450)
POTASSIUM BLD-SCNC: 5.2 MMOL/L (ref 3.5–5)
RBC # BLD AUTO: 4.52 10E6/UL (ref 3.8–5.2)
SARS-COV-2 RNA RESP QL NAA+PROBE: NEGATIVE
SODIUM SERPL-SCNC: 141 MMOL/L (ref 136–145)
WBC # BLD AUTO: 9 10E3/UL (ref 4–11)

## 2021-07-21 PROCEDURE — 99285 EMERGENCY DEPT VISIT HI MDM: CPT | Mod: 25

## 2021-07-21 PROCEDURE — 96365 THER/PROPH/DIAG IV INF INIT: CPT

## 2021-07-21 PROCEDURE — 87635 SARS-COV-2 COVID-19 AMP PRB: CPT | Performed by: EMERGENCY MEDICINE

## 2021-07-21 PROCEDURE — 250N000011 HC RX IP 250 OP 636: Performed by: EMERGENCY MEDICINE

## 2021-07-21 PROCEDURE — 96376 TX/PRO/DX INJ SAME DRUG ADON: CPT

## 2021-07-21 PROCEDURE — 29581 APPL MULTLAYER CMPRN SYS LEG: CPT

## 2021-07-21 PROCEDURE — 120N000001 HC R&B MED SURG/OB

## 2021-07-21 PROCEDURE — 99223 1ST HOSP IP/OBS HIGH 75: CPT | Mod: AI | Performed by: HOSPITALIST

## 2021-07-21 PROCEDURE — 87040 BLOOD CULTURE FOR BACTERIA: CPT | Performed by: EMERGENCY MEDICINE

## 2021-07-21 PROCEDURE — 96375 TX/PRO/DX INJ NEW DRUG ADDON: CPT

## 2021-07-21 PROCEDURE — 80048 BASIC METABOLIC PNL TOTAL CA: CPT | Performed by: EMERGENCY MEDICINE

## 2021-07-21 PROCEDURE — 36415 COLL VENOUS BLD VENIPUNCTURE: CPT | Performed by: EMERGENCY MEDICINE

## 2021-07-21 PROCEDURE — 86141 C-REACTIVE PROTEIN HS: CPT | Performed by: HOSPITALIST

## 2021-07-21 PROCEDURE — C9803 HOPD COVID-19 SPEC COLLECT: HCPCS

## 2021-07-21 PROCEDURE — 96367 TX/PROPH/DG ADDL SEQ IV INF: CPT

## 2021-07-21 PROCEDURE — 258N000003 HC RX IP 258 OP 636: Performed by: EMERGENCY MEDICINE

## 2021-07-21 PROCEDURE — 85025 COMPLETE CBC W/AUTO DIFF WBC: CPT | Performed by: EMERGENCY MEDICINE

## 2021-07-21 PROCEDURE — 258N000003 HC RX IP 258 OP 636: Performed by: HOSPITALIST

## 2021-07-21 RX ORDER — MORPHINE SULFATE 2 MG/ML
2 INJECTION, SOLUTION INTRAMUSCULAR; INTRAVENOUS ONCE
Status: COMPLETED | OUTPATIENT
Start: 2021-07-21 | End: 2021-07-21

## 2021-07-21 RX ORDER — POTASSIUM CHLORIDE 1500 MG/1
20 TABLET, EXTENDED RELEASE ORAL 2 TIMES DAILY
Status: ON HOLD | COMMUNITY
End: 2024-07-23

## 2021-07-21 RX ORDER — PIPERACILLIN SODIUM, TAZOBACTAM SODIUM 3; .375 G/15ML; G/15ML
3.38 INJECTION, POWDER, LYOPHILIZED, FOR SOLUTION INTRAVENOUS EVERY 8 HOURS
Status: DISCONTINUED | OUTPATIENT
Start: 2021-07-22 | End: 2021-07-27

## 2021-07-21 RX ORDER — PROCHLORPERAZINE 25 MG
12.5 SUPPOSITORY, RECTAL RECTAL EVERY 12 HOURS PRN
Status: DISCONTINUED | OUTPATIENT
Start: 2021-07-21 | End: 2021-07-27 | Stop reason: HOSPADM

## 2021-07-21 RX ORDER — MORPHINE SULFATE 2 MG/ML
2 INJECTION, SOLUTION INTRAMUSCULAR; INTRAVENOUS EVERY 4 HOURS PRN
Status: DISCONTINUED | OUTPATIENT
Start: 2021-07-21 | End: 2021-07-27 | Stop reason: HOSPADM

## 2021-07-21 RX ORDER — OXYCODONE HYDROCHLORIDE 5 MG/1
5 TABLET ORAL EVERY 4 HOURS PRN
Status: DISCONTINUED | OUTPATIENT
Start: 2021-07-21 | End: 2021-07-27 | Stop reason: HOSPADM

## 2021-07-21 RX ORDER — ONDANSETRON 4 MG/1
4 TABLET, ORALLY DISINTEGRATING ORAL EVERY 6 HOURS PRN
Status: DISCONTINUED | OUTPATIENT
Start: 2021-07-21 | End: 2021-07-27 | Stop reason: HOSPADM

## 2021-07-21 RX ORDER — PIPERACILLIN SODIUM, TAZOBACTAM SODIUM 3; .375 G/15ML; G/15ML
3.38 INJECTION, POWDER, LYOPHILIZED, FOR SOLUTION INTRAVENOUS ONCE
Status: COMPLETED | OUTPATIENT
Start: 2021-07-21 | End: 2021-07-21

## 2021-07-21 RX ORDER — MORPHINE SULFATE 2 MG/ML
2 INJECTION, SOLUTION INTRAMUSCULAR; INTRAVENOUS
Status: COMPLETED | OUTPATIENT
Start: 2021-07-21 | End: 2021-07-21

## 2021-07-21 RX ORDER — PROCHLORPERAZINE MALEATE 5 MG
5 TABLET ORAL EVERY 6 HOURS PRN
Status: DISCONTINUED | OUTPATIENT
Start: 2021-07-21 | End: 2021-07-27 | Stop reason: HOSPADM

## 2021-07-21 RX ORDER — LIDOCAINE 40 MG/G
CREAM TOPICAL
Status: DISCONTINUED | OUTPATIENT
Start: 2021-07-21 | End: 2021-07-27 | Stop reason: HOSPADM

## 2021-07-21 RX ORDER — ONDANSETRON 2 MG/ML
4 INJECTION INTRAMUSCULAR; INTRAVENOUS EVERY 6 HOURS PRN
Status: DISCONTINUED | OUTPATIENT
Start: 2021-07-21 | End: 2021-07-27 | Stop reason: HOSPADM

## 2021-07-21 RX ORDER — ROPINIROLE 1 MG/1
1 TABLET, FILM COATED ORAL EVERY EVENING
COMMUNITY

## 2021-07-21 RX ADMIN — MORPHINE SULFATE 2 MG: 2 INJECTION, SOLUTION INTRAMUSCULAR; INTRAVENOUS at 20:59

## 2021-07-21 RX ADMIN — PIPERACILLIN AND TAZOBACTAM 3.38 G: 3; .375 INJECTION, POWDER, LYOPHILIZED, FOR SOLUTION INTRAVENOUS at 18:57

## 2021-07-21 RX ADMIN — MORPHINE SULFATE 2 MG: 2 INJECTION, SOLUTION INTRAMUSCULAR; INTRAVENOUS at 23:03

## 2021-07-21 RX ADMIN — VANCOMYCIN HYDROCHLORIDE 2000 MG: 5 INJECTION, POWDER, LYOPHILIZED, FOR SOLUTION INTRAVENOUS at 22:21

## 2021-07-21 RX ADMIN — SODIUM CHLORIDE 250 ML: 9 INJECTION, SOLUTION INTRAVENOUS at 22:34

## 2021-07-21 ASSESSMENT — ACTIVITIES OF DAILY LIVING (ADL)
DIFFICULTY_COMMUNICATING: NO
CONCENTRATING,_REMEMBERING_OR_MAKING_DECISIONS_DIFFICULTY: NO
DIFFICULTY_EATING/SWALLOWING: NO
EQUIPMENT_CURRENTLY_USED_AT_HOME: CANE, STRAIGHT
WALKING_OR_CLIMBING_STAIRS: AMBULATION DIFFICULTY, REQUIRES EQUIPMENT
DOING_ERRANDS_INDEPENDENTLY_DIFFICULTY: NO
DRESSING/BATHING_DIFFICULTY: NO
WALKING_OR_CLIMBING_STAIRS_DIFFICULTY: YES
FALL_HISTORY_WITHIN_LAST_SIX_MONTHS: NO
TOILETING_ISSUES: NO

## 2021-07-21 ASSESSMENT — MIFFLIN-ST. JEOR
SCORE: 1571.84
SCORE: 1580.94

## 2021-07-21 ASSESSMENT — PAIN SCALES - GENERAL: PAINLEVEL: SEVERE PAIN (6)

## 2021-07-21 NOTE — PATIENT INSTRUCTIONS
Patient Education     Wound Care  Taking good care of your wound will help it heal. Your healthcare provider may show you how to clean and dress the wound. He or she will also explain how to tell if the wound is healing normally. If you are unsure of how to take care of the wound, ask what dressing to use and how often you should change the bandages. Below are the basic steps.   Wash your hands    Tips for washing your hands:    Use liquid soap and lather for 2 minutes. Scrub between your fingers and under your nails.    Rinse with clean, running water, keeping your fingers pointed down.    Use a paper towel to dry your hands and to turn off the faucet.  Remove the used dressing  Here are suggestions for removing the dressing:     If dressing changes cause you pain, take your pain medicine as prescribed by your healthcare provider 30 minutes before dressing changes.    Set up your supplies.    Put on disposable gloves if you re dressing a wound for someone else or your wound is infected.    Loosen the tape by pulling gently toward the wound.    Gently take off the old dressing. If the dressing is stuck to the wound, moisten it with saline (if available) or clean water.    If you have a drain or tube in the wound, be careful not to pull on it.    Remove the dressing 1 layer at a time and put it in a plastic bag. Seal the bag and put it in the trash.    Remove your gloves.  Inspect and dress the wound  Check the wound carefully:    Each time you change the dressing, check the wound carefully to be sure it s healing normally. Check that your wound appears to be pink and moist, and is free of infection.      Wash your hands again. Put on a new pair of gloves.    Clean and dress the wound as directed by your healthcare provider or nurse. Don't put anything in the wound that is not prescribed or directed by your healthcare provider. If you have a drain or tube, be careful not to pull on it. Secure the drain or tube as  well.    Put all unused supplies in a clean plastic bag. Seal the bag and store it in a clean, dry area between dressing changes.     Wash your hands again.  Call your healthcare provider  Call your healthcare provider if you see any of the following signs of a problem:     Bleeding that soaks the dressing    Pink fluid weeping from the wound    Increased drainage or drainage that is yellow, yellow-green, or foul-smelling    Increased swelling or pain, or redness or swelling in the skin around the wound    A change in the color of the wound, or if streaks develop in a direction away from the wound    The area between any stitches opens up    An increase in the size of the wound    A fever of 100.4 F (38 C) or higher, or as directed by your healthcare provider    Chills, increased fatigue, or a loss of appetite  Mesha last reviewed this educational content on 11/1/2019 2000-2021 [employer]. All rights reserved. This information is not intended as a substitute for professional medical care. Always follow your healthcare professional's instructions.

## 2021-07-21 NOTE — ED PROVIDER NOTES
Expected Patient Referral to ED  1:50 PM    Referring Clinic/Provider:  [facility]    Reason for referral/Clinical facts:  Cellulitis on right leg, incr pain, concerns for failing outpatient management.    Recommendations provided:  Will see in ER.      Caller was informed that this institution does  possess the capabilities and/or resources to provide for patient and should be transferred to our institution.    Based on the information provided, discussed that this patient likely is nota good candidate for direct admission to this institution and that provider could proceed as such.  If however direct admit is sought and any hurdles encountered, this ED would be happy to see the patient and evaluate.    Informed caller that recommendations provided are recommendations based only on the facts provided and that they responsible to accept or reject the advice, or to seek a formal in person consultation as needed and that this ED will see/treat patient should they arrive.      Wanda Patterson MD  Emergency Medicine  [facility] EMERGENCY DEPARTMENT  [address] [place] [id number]  [phone]          Wanda Patterson MD  07/21/21 4780

## 2021-07-21 NOTE — NURSING NOTE
[facility]  -  Nurse Visit    R medial leg      R posterior leg      R leg        Chief Complaint: Patient presents to clinic for assement, and treatment of their wound(s) and swelling.     Dressing on Arrival: Tubular compression intact bilaterallt      ICD-10-CM    1. Chronic ulcer of right leg, with fat layer exposed (H)  L97.912          Allergies:   Allergies   Allergen Reactions     Other Environmental Allergy Anaphylaxis     Bees/Wasps Stings     Adhesive [Mecrylate] Unknown     Other reaction(s): sores     Vicodin [Hydrocodone-Acetaminophen] Nausea and Vomiting and Rash       Medications:   Current Outpatient Medications:      aspirin 81 mg chewable tablet, [ASPIRIN 81 MG CHEWABLE TABLET] Chew 81 mg daily., Disp: , Rfl:      buPROPion (WELLBUTRIN SR) 150 MG 12 hr tablet, [BUPROPION (WELLBUTRIN SR) 150 MG 12 HR TABLET] , Disp: , Rfl:      cholecalciferol, vitamin D3, 1,000 unit tablet, [CHOLECALCIFEROL, VITAMIN D3, 1,000 UNIT TABLET] Take 2,000 Units by mouth daily., Disp: , Rfl:      citalopram (CELEXA) 40 MG tablet, [CITALOPRAM (CELEXA) 40 MG TABLET] Take 1 tablet by mouth daily., Disp: , Rfl:      furosemide (LASIX) 20 MG tablet, [FUROSEMIDE (LASIX) 20 MG TABLET] , Disp: , Rfl:      ketorolac (TORADOL) 10 MG tablet, Take 10 mg by mouth every 6 hours as needed for moderate pain, Disp: , Rfl:      multivitamin therapeutic (THERAGRAN) tablet, [MULTIVITAMIN THERAPEUTIC (THERAGRAN) TABLET] Take 1 tablet by mouth daily., Disp: , Rfl:      naproxen sodium (ALEVE) 220 MG tablet, 1 tab(s), Disp: , Rfl:      pantoprazole (PROTONIX) 40 MG tablet, [PANTOPRAZOLE (PROTONIX) 40 MG TABLET] Take 40 mg by mouth daily., Disp: , Rfl:      potassium chloride SA (K-DUR,KLOR-CON) 20 MEQ tablet, [POTASSIUM CHLORIDE SA (K-DUR,KLOR-CON) 20 MEQ TABLET] , Disp: , Rfl:      rOPINIRole (REQUIP) 1 MG tablet, [ROPINIROLE (REQUIP) 1 MG TABLET] Take 1 mg by mouth 2 (two) times a day., Disp: , Rfl:       simvastatin (ZOCOR) 40 MG tablet, [SIMVASTATIN (ZOCOR) 40 MG TABLET] Take 40 mg by mouth bedtime., Disp: , Rfl:      spironolactone (ALDACTONE) 25 MG tablet, [SPIRONOLACTONE (ALDACTONE) 25 MG TABLET] Take 25 mg by mouth daily., Disp: , Rfl:      traMADol (ULTRAM) 50 MG tablet, Take 50 mg by mouth every 6 hours as needed for severe pain, Disp: , Rfl:      predniSONE (DELTASONE) 20 MG tablet, 2 qd for 3 days and 1 qd for 4 days (Patient not taking: Reported on 7/21/2021), Disp: , Rfl:     Current Facility-Administered Medications:      lidocaine (XYLOCAINE) 2 % external gel, , Topical, PRN, [place], Maria G Pablo, LAXMI CNP    Vital Signs: /70   Pulse 84   Temp 97.5  F (36.4  C)   Resp 17   Wt 250 lb 12.8 oz (113.8 kg)   BMI 47.39 kg/m          Assessment:    General:  Patient presents to clinic in no apparent distress.  Psychiatric:  Alert and oriented x3.   Lower extremity:  edema is present.    Integumentary:  Skin is red, macerated    Wound info:  VASC Wound right lower leg (Active)   Pre Size Length 20 07/21/21 1300   Pre Size Width 25 07/21/21 1300   Pre Size Depth 0.2 07/21/21 1300   Pre Total Sq cm 500 07/21/21 1300       Undermining is present.    The periwoundskin is erythema    Plan:         1. Patient will follow up in 2 days with Maria G.          2. Treatment provided will include irrigation and dressings to promote autolytic debridement and will be as listed below. Pt stated that she has been up since 1am due to R leg pain. She had to add baby diapers due to drainage to the top and bottom of the R leg. No odor. Tubular compression and dressings removed. Slowing had to remove the hydrofera blue with saline. Had to stop 3 times due to pt not tolerating. She began crying and stated that it was very tender. Writer tried to clean the RLL wound with soap/water and wash cloth. Pt asked if only wash the open areas once because it was too painful. She cried throughout. Maria G was shown the pt  photos, she suggested that pt to to the ED for further inpt work up. Pt agreed with the plan. [facility] ED was contacted. Leg was wrapped with dressing below.      Cleansed with: soap and water    Protected skin with: NA    Dressings Applied: Adaptic, ABD and Secured with roll gauze and tape.     Compression Applied to the Left Leg: Tubular compression    Compression Applied to the Right Leg: Tubular compression    Offloading applied: cane    Trial Products: No  Provider notified regarding concerns: Yes  Treatment Changes: Yes        Kristen Kelley RN

## 2021-07-21 NOTE — ED PROVIDER NOTES
EMERGENCY DEPARTMENT ENCOUNTER      NAME: Elizabeth Kelley  AGE: 75 year old female  YOB: 1945  MRN: [id number]  EVALUATION DATE & TIME: 7/21/2021  6:04 PM    PCP: Francisco Ramirez    ED PROVIDER: Jasper Zayas D.O.      Chief Complaint   Patient presents with     Leg Pain         HPI    Patient information was obtained from: Patient     Use of : N/A        Elizabeth Kelley is a 75 year old female with a history of obesity, lymphedema, skin ulcer of left lower leg, melanoma, and multiple sclerosis who presents to this ED by walk in for evaluation of right leg pain and swelling.     Patient was referred to the ED today from the Vascular [facility]. She reports that she cut her right leg while shaving back in October 2020 and developed a sore that has not healed. She states that the swelling to her right lower leg worsened in January 2021 (~6 months ago), extending from her knee to her ankle, and has never improved. She has been following with a lymphedema specialist and vascular clinic. She states that her bandages are changed by wound care 3 x per week and on Monday (2 days ago) a new product was used on the wound. She has hand increased pain and raw skin to her right lower leg since then. Patient also reports that she generally feels more weak than usual today.     She endorses a mild intermittent cough due to allergies. She otherwise denies fever, congestion, sore throat, or additional symptoms at this time.      Patient reports a history of MS. She has had prior breast reduction surgery, back surgery, eye surgery, and most recently a right saphenous vein surgery (~3 months ago). No history of hypertension or diabetes. She notes an allergy to Vicodin.     Social history: Former smoker. Occasional alcohol use.        REVIEW OF SYSTEMS  Constitutional:  Endorses generalized weakness. Denies fever, chills, weight loss  Eyes:  No pain, discharge, redness  HENT:  Denies sore throat, ear pain,  congestion  Respiratory: Endorses cough (due to allergies). No SOB, wheeze  Cardiovascular:  No CP, palpitations  GI:  Denies abdominal pain, nausea, vomiting, diarrhea  : Denies dysuria, hematuria  Musculoskeletal:  Endorses right lower leg pain and swelling. Denies any new muscle/joint loss of function.  Skin:  Endorses redness and wound to right lower leg. Denies rash, pallor  Neurologic:  Denies headache, focal weakness or sensory changes  Lymph: Denies swollen nodes    All other systems negative unless noted in HPI.    PAST MEDICAL HISTORY:  Past Medical History:   Diagnosis Date     Ankle contracture, left      Class 3 severe obesity due to excess calories without serious comorbidity with body mass index (BMI) of 45.0 to 49.9 in adult (H)      Combined gastric and duodenal ulcer      Depression      Dyslipidemia      Edema      Gait abnormality      GERD (gastroesophageal reflux disease)      Lymphedema of both lower extremities      Melanoma (H)     left upper arm     MS (multiple sclerosis) (H)      RLS (restless legs syndrome)      Skin ulcer of left lower leg (H)      Valgus deformity of both feet      Vitamin D deficiency        PAST SURGICAL HISTORY:  Past Surgical History:   Procedure Laterality Date     ABLATION SAPHENOUS VEIN W/ RFA Right 04/12/2021    Saphenous vein, anterior accessory saphenous vein, sclerotherapy of multiple veins.     COSMETIC SURGERY  1988    reduction of excess skin from weight loss     ESOPHAGOSCOPY, GASTROSCOPY, DUODENOSCOPY (EGD), COMBINED N/A 03/30/2015    Procedure: UPPER ENDOSCOPY with gastric biopsy;  Surgeon: Checo Fletcher MD;  Location: [facility];  Service:      MAMMOPLASTY REDUCTION Bilateral      MOHS MICROGRAPHIC PROCEDURE      left upper arm, melanoma     SPINE SURGERY      L5 area surgery, decompression         CURRENT MEDICATIONS:    Current Facility-Administered Medications   Medication     lidocaine (XYLOCAINE) 2 % external gel     Current Outpatient  Medications   Medication     aspirin 81 mg chewable tablet     buPROPion (WELLBUTRIN SR) 150 MG 12 hr tablet     cholecalciferol, vitamin D3, 1,000 unit tablet     citalopram (CELEXA) 40 MG tablet     furosemide (LASIX) 20 MG tablet     ketorolac (TORADOL) 10 MG tablet     multivitamin therapeutic (THERAGRAN) tablet     naproxen sodium (ALEVE) 220 MG tablet     pantoprazole (PROTONIX) 40 MG tablet     potassium chloride SA (K-DUR,KLOR-CON) 20 MEQ tablet     predniSONE (DELTASONE) 20 MG tablet     rOPINIRole (REQUIP) 1 MG tablet     simvastatin (ZOCOR) 40 MG tablet     spironolactone (ALDACTONE) 25 MG tablet     traMADol (ULTRAM) 50 MG tablet         ALLERGIES:  Allergies   Allergen Reactions     Other Environmental Allergy Anaphylaxis     Bees/Wasps Stings     Adhesive [Mecrylate] Unknown     Other reaction(s): sores     Vicodin [Hydrocodone-Acetaminophen] Nausea and Vomiting and Rash       FAMILY HISTORY:  Family History   Problem Relation Age of Onset     Uterine Cancer Mother      Hyperlipidemia Mother      Hypertension Mother      Multiple Sclerosis Mother      Asthma Sister      Obesity Sister      Hyperlipidemia Sister      Hypertension Sister      Multiple Sclerosis Sister      Arthritis Sister      Colon Cancer Paternal Aunt      Breast Cancer Paternal Aunt      Hypertension Paternal Aunt      Hyperlipidemia Paternal Aunt      Brain Cancer Father      Arthritis Maternal Uncle      Arthritis Maternal Grandmother      Early Death Maternal Grandfather      Arthritis Paternal Grandmother      Arthritis Paternal Grandfather        SOCIAL HISTORY:  Social History     Socioeconomic History     Marital status: Single     Spouse name: Not on file     Number of children: Not on file     Years of education: Not on file     Highest education level: Not on file   Occupational History     Not on file   Tobacco Use     Smoking status: Former Smoker     Packs/day: 0.25     Years: 12.00     Pack years: 3.00     Types:  "Cigarettes     Quit date: 1975     Years since quittin.6     Smokeless tobacco: Never Used     Tobacco comment: quit more than 30 years ago   Substance and Sexual Activity     Alcohol use: Yes     Alcohol/week: 1.0 standard drinks     Drug use: No     Sexual activity: Yes     Partners: Male     Birth control/protection: Post-menopausal   Other Topics Concern     Not on file   Social History Narrative    .   Has significant other.  2 grown children.  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       Social Determinants of Health     Financial Resource Strain:      Difficulty of Paying Living Expenses:    Food Insecurity:      Worried About Running Out of Food in the Last Year:      Ran Out of Food in the Last Year:    Transportation Needs:      Lack of Transportation (Medical):      Lack of Transportation (Non-Medical):    Physical Activity:      Days of Exercise per Week:      Minutes of Exercise per Session:    Stress:      Feeling of Stress :    Social Connections:      Frequency of Communication with Friends and Family:      Frequency of Social Gatherings with Friends and Family:      Attends Zoroastrian Services:      Active Member of Clubs or Organizations:      Attends Club or Organization Meetings:      Marital Status:    Intimate Partner Violence:      Fear of Current or Ex-Partner:      Emotionally Abused:      Physically Abused:      Sexually Abused:        VITALS:  Patient Vitals for the past 24 hrs:   BP Temp Temp src Pulse Resp SpO2 Height Weight   21 2106 (!) 151/72 -- -- 82 16 99 % -- --   21 1908 (!) 160/80 -- -- 79 16 100 % -- --   21 1536 (!) 198/86 98  F (36.7  C) Oral 76 16 100 % 1.549 m (5' 1\") 112.9 kg (249 lb)       PHYSICAL EXAM    VITAL SIGNS: BP (!) 151/72   Pulse 82   Temp 98  F (36.7  C) (Oral)   Resp 16   Ht 1.549 m (5' 1\")   Wt 112.9 kg (249 lb)   SpO2 99%   BMI 47.05 kg/m      General Appearance: Well-appearing, well-nourished, no acute distress "   Head:  Normocephalic, without obvious abnormality, atraumatic  Eyes:  PERRL, conjunctiva/corneas clear, EOM's intact,  ENT:  Lips, mucosa, and tongue normal, membranes are moist without pallor  Neck:  Normal ROM, symmetrical, trachea midline    Cardio:  Regular rate and rhythm, no murmur, rub or gallop, 2+ pulses symmetric in all extremities  Pulm:  Clear to auscultation bilaterally, respirations unlabored,  Abdomen:  Soft, non-tender, no rebound or guarding.  Musculoskeletal: Full ROM, no cyanosis, good ROM of major joints.  Integument:  Warm, Dry, No rash. Swelling and erythema with warmth and weeping drainage to right lower leg from knee to ankle.    Neurologic:  Alert & oriented.  No focal deficits appreciated.  Ambulatory.  Psychiatric:  Affect normal, Judgment normal, Mood normal.      LABS  Results for orders placed or performed during the hospital encounter of 07/21/21 (from the past 24 hour(s))   CBC with platelets + differential    Narrative    The following orders were created for panel order CBC with platelets + differential.  Procedure                               Abnormality         Status                     ---------                               -----------         ------                     CBC with platelets and d...[[id number]]  Abnormal            Final result                 Please view results for these tests on the individual orders.   Basic metabolic panel   Result Value Ref Range    Sodium 141 136 - 145 mmol/L    Potassium 5.2 (H) 3.5 - 5.0 mmol/L    Chloride 103 98 - 107 mmol/L    Carbon Dioxide (CO2) 26 22 - 31 mmol/L    Anion Gap 12 5 - 18 mmol/L    Urea Nitrogen 23 8 - 28 mg/dL    Creatinine 0.82 0.60 - 1.10 mg/dL    Calcium 9.7 8.5 - 10.5 mg/dL    Glucose 91 70 - 125 mg/dL    GFR Estimate 71 >60 mL/min/1.73m2   CBC with platelets and differential   Result Value Ref Range    WBC Count 9.0 4.0 - 11.0 10e3/uL    RBC Count 4.52 3.80 - 5.20 10e6/uL    Hemoglobin 11.8 11.7 - 15.7 g/dL     Hematocrit 38.6 35.0 - 47.0 %    MCV 85 78 - 100 fL    MCH 26.1 (L) 26.5 - 33.0 pg    MCHC 30.6 (L) 31.5 - 36.5 g/dL    RDW 14.7 10.0 - 15.0 %    Platelet Count 397 150 - 450 10e3/uL    % Neutrophils 62 %    % Lymphocytes 18 %    % Monocytes 12 %    % Eosinophils 6 %    % Basophils 1 %    % Immature Granulocytes 1 %    NRBCs per 100 WBC 0 <1 /100    Absolute Neutrophils 5.6 1.6 - 8.3 10e3/uL    Absolute Lymphocytes 1.6 0.8 - 5.3 10e3/uL    Absolute Monocytes 1.1 0.0 - 1.3 10e3/uL    Absolute Eosinophils 0.5 0.0 - 0.7 10e3/uL    Absolute Basophils 0.1 0.0 - 0.2 10e3/uL    Absolute Immature Granulocytes 0.1 (H) <=0.0 10e3/uL    Absolute NRBCs 0.0 10e3/uL       FINAL IMPRESSION:  1. Cellulitis of right lower extremity          ED COURSE & MEDICAL DECISION MAKIN:11 PM I met with the patient to gather history and to perform my initial exam. I discussed the plan for care while in the Emergency Department. PPE: Provider wore surgical mask and gloves   9:02 PM I rechecked and updated the patient.   9:09 PM I discussed the case with hospitalist, Dr. Matthews.     Pertinent Labs & Imaging studies reviewed. (See chart for details)  75 year old female presents to the Emergency Department for evaluation of swelling and erythema of her right lower leg that has been going on since a chronic wound several months ago.  Patient recently was treated with antibiotics without success.  It is very erythemic and warm to the touch with significant drainage.  She does have a known history of lymphedema, however I am concerned that this is a cellulitis.  She is not showing signs of sepsis at this time.  The patient does have a known history of MRSA, and therefore will be treated with both Zosyn and vancomycin.  Patient was agreeable with the plan.  Discussed with the hospitalist who agreed to the admission.    At the conclusion of the encounter I discussed the results of all of the tests and the disposition. The questions were answered.  The patient or family acknowledged understanding and was agreeable with the care plan.      MEDICATIONS GIVEN IN THE EMERGENCY:  Medications   piperacillin-tazobactam (ZOSYN) 3.375 g vial to attach to  mL bag (0 g Intravenous Stopped 7/21/21 1927)   morphine (PF) injection 2 mg (2 mg Intravenous Given 7/21/21 2059)       NEW PRESCRIPTIONS STARTED AT TODAY'S ER VISIT  New Prescriptions    No medications on file          I, Madelyn Clifford, am serving as a scribe to document services personally performed by Dr. Jasper Zayas, based on my observations and the provider's statements to me. I, Jasper Zayas, DO attest that Madelyn Clifford is acting in a scribe capacity, has observed my performance of the services and has documented them in accordance with my direction.      Jasper Zayas D.O.  Emergency Medicine  [facility] EMERGENCY ROOM  [address] [facility] [address]  [phone]  Dept: [phone]     Jasper Zayas DO  07/21/21 2122

## 2021-07-21 NOTE — ED NOTES
Expected Patient Referral to ED  2:02 PM    Referring Clinic/Provider:  [facility]    Reason for referral/Clinical facts:  Patient with cellulitis, failed outpatient therapy.  Does not appear septic.    Recommendations provided:  Call hospitalist to see if can arrange direct admission.  However, provider was unaware of how to set this up and the patient has left the clinic, is en route to the ER by private car to pursue hospital admission.    Caller was informed that this institution does  possess the capabilities and/or resources to provide for patient and should be transferred to our institution.    Based on the information provided, discussed that this patient likely purnima good candidate for direct admission to this institution and that provider could proceed as such.  If however direct admit is sought and any hurdles encountered, this ED would be happy to see the patient and evaluate.    Informed caller that recommendations provided are recommendations based only on the facts provided and that they responsible to accept or reject the advice, or to seek a formal in person consultation as needed and that this ED will see/treat patient should they arrive.      Rosa Maria Zavala MD  Emergency Medicine  [facility] EMERGENCY ROOM  [address] [place] [id number]  [phone]       Rosa Maria Zavala MD  07/21/21 1795

## 2021-07-21 NOTE — ED TRIAGE NOTES
Patient is here with right leg pain swelling and raw skin. She did cut herself with a razor back in Sept and developed a sore that hasn't healed. The wound care used a new product Monday and she has had increased pain and raw skin.

## 2021-07-22 ENCOUNTER — APPOINTMENT (OUTPATIENT)
Dept: ULTRASOUND IMAGING | Facility: CLINIC | Age: 76
DRG: 603 | End: 2021-07-22
Attending: HOSPITALIST
Payer: COMMERCIAL

## 2021-07-22 LAB
ANION GAP SERPL CALCULATED.3IONS-SCNC: 7 MMOL/L (ref 5–18)
BUN SERPL-MCNC: 17 MG/DL (ref 8–28)
C REACTIVE PROTEIN LHE: 6.5 MG/DL (ref 0–0.8)
C REACTIVE PROTEIN LHE: 7.7 MG/DL (ref 0–0.8)
CALCIUM SERPL-MCNC: 8.5 MG/DL (ref 8.5–10.5)
CHLORIDE BLD-SCNC: 107 MMOL/L (ref 98–107)
CO2 SERPL-SCNC: 26 MMOL/L (ref 22–31)
CREAT SERPL-MCNC: 0.74 MG/DL (ref 0.6–1.1)
ERYTHROCYTE [DISTWIDTH] IN BLOOD BY AUTOMATED COUNT: 14.6 % (ref 10–15)
GFR SERPL CREATININE-BSD FRML MDRD: 81 ML/MIN/1.73M2
GLUCOSE BLD-MCNC: 99 MG/DL (ref 70–125)
HCT VFR BLD AUTO: 31.5 % (ref 35–47)
HGB BLD-MCNC: 9.6 G/DL (ref 11.7–15.7)
HOLD SPECIMEN: NORMAL
MCH RBC QN AUTO: 25.9 PG (ref 26.5–33)
MCHC RBC AUTO-ENTMCNC: 30.5 G/DL (ref 31.5–36.5)
MCV RBC AUTO: 85 FL (ref 78–100)
PLATELET # BLD AUTO: 322 10E3/UL (ref 150–450)
POTASSIUM BLD-SCNC: 4 MMOL/L (ref 3.5–5)
RBC # BLD AUTO: 3.71 10E6/UL (ref 3.8–5.2)
SODIUM SERPL-SCNC: 140 MMOL/L (ref 136–145)
WBC # BLD AUTO: 10 10E3/UL (ref 4–11)

## 2021-07-22 PROCEDURE — 85027 COMPLETE CBC AUTOMATED: CPT | Performed by: HOSPITALIST

## 2021-07-22 PROCEDURE — 250N000011 HC RX IP 250 OP 636: Performed by: HOSPITALIST

## 2021-07-22 PROCEDURE — 99232 SBSQ HOSP IP/OBS MODERATE 35: CPT | Performed by: INTERNAL MEDICINE

## 2021-07-22 PROCEDURE — G0463 HOSPITAL OUTPT CLINIC VISIT: HCPCS

## 2021-07-22 PROCEDURE — 93970 EXTREMITY STUDY: CPT

## 2021-07-22 PROCEDURE — 99207 PR CDG-MDM COMPONENT: MEETS MODERATE - DOWN CODED: CPT | Performed by: INTERNAL MEDICINE

## 2021-07-22 PROCEDURE — 36415 COLL VENOUS BLD VENIPUNCTURE: CPT | Performed by: HOSPITALIST

## 2021-07-22 PROCEDURE — 120N000001 HC R&B MED SURG/OB

## 2021-07-22 PROCEDURE — 250N000013 HC RX MED GY IP 250 OP 250 PS 637: Performed by: HOSPITALIST

## 2021-07-22 PROCEDURE — 86141 C-REACTIVE PROTEIN HS: CPT | Performed by: HOSPITALIST

## 2021-07-22 PROCEDURE — 99222 1ST HOSP IP/OBS MODERATE 55: CPT | Performed by: INTERNAL MEDICINE

## 2021-07-22 PROCEDURE — 80048 BASIC METABOLIC PNL TOTAL CA: CPT | Performed by: HOSPITALIST

## 2021-07-22 PROCEDURE — 258N000003 HC RX IP 258 OP 636: Performed by: HOSPITALIST

## 2021-07-22 RX ORDER — ASPIRIN 81 MG/1
81 TABLET, CHEWABLE ORAL EVERY EVENING
Status: DISCONTINUED | OUTPATIENT
Start: 2021-07-22 | End: 2021-07-27 | Stop reason: HOSPADM

## 2021-07-22 RX ORDER — NALOXONE HYDROCHLORIDE 0.4 MG/ML
0.2 INJECTION, SOLUTION INTRAMUSCULAR; INTRAVENOUS; SUBCUTANEOUS
Status: DISCONTINUED | OUTPATIENT
Start: 2021-07-22 | End: 2021-07-27 | Stop reason: HOSPADM

## 2021-07-22 RX ORDER — NALOXONE HYDROCHLORIDE 0.4 MG/ML
0.4 INJECTION, SOLUTION INTRAMUSCULAR; INTRAVENOUS; SUBCUTANEOUS
Status: DISCONTINUED | OUTPATIENT
Start: 2021-07-22 | End: 2021-07-27 | Stop reason: HOSPADM

## 2021-07-22 RX ORDER — TRAMADOL HYDROCHLORIDE 50 MG/1
50 TABLET ORAL EVERY 6 HOURS PRN
Status: DISCONTINUED | OUTPATIENT
Start: 2021-07-22 | End: 2021-07-27 | Stop reason: HOSPADM

## 2021-07-22 RX ORDER — SIMVASTATIN 40 MG
40 TABLET ORAL AT BEDTIME
Status: DISCONTINUED | OUTPATIENT
Start: 2021-07-22 | End: 2021-07-27 | Stop reason: HOSPADM

## 2021-07-22 RX ORDER — ROPINIROLE 0.5 MG/1
0.5 TABLET, FILM COATED ORAL 2 TIMES DAILY
Status: DISCONTINUED | OUTPATIENT
Start: 2021-07-22 | End: 2021-07-27 | Stop reason: HOSPADM

## 2021-07-22 RX ORDER — SPIRONOLACTONE 25 MG/1
25 TABLET ORAL DAILY
Status: DISCONTINUED | OUTPATIENT
Start: 2021-07-22 | End: 2021-07-27 | Stop reason: HOSPADM

## 2021-07-22 RX ORDER — ROPINIROLE 1 MG/1
1 TABLET, FILM COATED ORAL AT BEDTIME
Status: DISCONTINUED | OUTPATIENT
Start: 2021-07-22 | End: 2021-07-27 | Stop reason: HOSPADM

## 2021-07-22 RX ORDER — BUPROPION HYDROCHLORIDE 150 MG/1
150 TABLET, EXTENDED RELEASE ORAL EVERY MORNING
Status: DISCONTINUED | OUTPATIENT
Start: 2021-07-22 | End: 2021-07-27 | Stop reason: HOSPADM

## 2021-07-22 RX ORDER — FUROSEMIDE 20 MG
20 TABLET ORAL 2 TIMES DAILY
Status: DISCONTINUED | OUTPATIENT
Start: 2021-07-22 | End: 2021-07-27 | Stop reason: HOSPADM

## 2021-07-22 RX ORDER — CITALOPRAM HYDROBROMIDE 20 MG/1
40 TABLET ORAL EVERY MORNING
Status: DISCONTINUED | OUTPATIENT
Start: 2021-07-22 | End: 2021-07-27 | Stop reason: HOSPADM

## 2021-07-22 RX ORDER — PANTOPRAZOLE SODIUM 20 MG/1
40 TABLET, DELAYED RELEASE ORAL DAILY
Status: DISCONTINUED | OUTPATIENT
Start: 2021-07-22 | End: 2021-07-27 | Stop reason: HOSPADM

## 2021-07-22 RX ORDER — VITAMIN B COMPLEX
2000 TABLET ORAL DAILY
Status: DISCONTINUED | OUTPATIENT
Start: 2021-07-22 | End: 2021-07-27 | Stop reason: HOSPADM

## 2021-07-22 RX ORDER — ROPINIROLE 0.5 MG/1
0.5 TABLET, FILM COATED ORAL 2 TIMES DAILY
Status: DISCONTINUED | OUTPATIENT
Start: 2021-07-22 | End: 2021-07-22

## 2021-07-22 RX ADMIN — PIPERACILLIN AND TAZOBACTAM 3.38 G: 3; .375 INJECTION, POWDER, LYOPHILIZED, FOR SOLUTION INTRAVENOUS at 03:46

## 2021-07-22 RX ADMIN — ASPIRIN 81 MG CHEWABLE TABLET 81 MG: 81 TABLET CHEWABLE at 20:16

## 2021-07-22 RX ADMIN — ENOXAPARIN SODIUM 40 MG: 100 INJECTION SUBCUTANEOUS at 00:46

## 2021-07-22 RX ADMIN — PIPERACILLIN AND TAZOBACTAM 3.38 G: 3; .375 INJECTION, POWDER, LYOPHILIZED, FOR SOLUTION INTRAVENOUS at 11:54

## 2021-07-22 RX ADMIN — BUPROPION HYDROCHLORIDE 150 MG: 150 TABLET, FILM COATED, EXTENDED RELEASE ORAL at 09:06

## 2021-07-22 RX ADMIN — OXYCODONE HYDROCHLORIDE 5 MG: 5 TABLET ORAL at 10:35

## 2021-07-22 RX ADMIN — OXYCODONE HYDROCHLORIDE 5 MG: 5 TABLET ORAL at 14:35

## 2021-07-22 RX ADMIN — MORPHINE SULFATE 2 MG: 2 INJECTION, SOLUTION INTRAMUSCULAR; INTRAVENOUS at 15:14

## 2021-07-22 RX ADMIN — PANTOPRAZOLE SODIUM 40 MG: 20 TABLET, DELAYED RELEASE ORAL at 08:58

## 2021-07-22 RX ADMIN — ROPINIROLE HYDROCHLORIDE 1 MG: 1 TABLET, FILM COATED ORAL at 20:16

## 2021-07-22 RX ADMIN — MORPHINE SULFATE 2 MG: 2 INJECTION, SOLUTION INTRAMUSCULAR; INTRAVENOUS at 00:38

## 2021-07-22 RX ADMIN — Medication 2000 UNITS: at 09:06

## 2021-07-22 RX ADMIN — ENOXAPARIN SODIUM 40 MG: 100 INJECTION SUBCUTANEOUS at 11:54

## 2021-07-22 RX ADMIN — FUROSEMIDE 20 MG: 20 TABLET ORAL at 08:59

## 2021-07-22 RX ADMIN — SIMVASTATIN 40 MG: 40 TABLET, FILM COATED ORAL at 20:16

## 2021-07-22 RX ADMIN — VANCOMYCIN HYDROCHLORIDE 1500 MG: 5 INJECTION, POWDER, LYOPHILIZED, FOR SOLUTION INTRAVENOUS at 22:05

## 2021-07-22 RX ADMIN — SPIRONOLACTONE 25 MG: 25 TABLET, FILM COATED ORAL at 08:59

## 2021-07-22 RX ADMIN — FUROSEMIDE 20 MG: 20 TABLET ORAL at 20:17

## 2021-07-22 RX ADMIN — CITALOPRAM HYDROBROMIDE 40 MG: 20 TABLET ORAL at 09:06

## 2021-07-22 RX ADMIN — OXYCODONE HYDROCHLORIDE 5 MG: 5 TABLET ORAL at 21:59

## 2021-07-22 RX ADMIN — ROPINIROLE HYDROCHLORIDE 0.5 MG: 0.5 TABLET, FILM COATED ORAL at 17:15

## 2021-07-22 RX ADMIN — PIPERACILLIN AND TAZOBACTAM 3.38 G: 3; .375 INJECTION, POWDER, LYOPHILIZED, FOR SOLUTION INTRAVENOUS at 20:18

## 2021-07-22 RX ADMIN — ROPINIROLE HYDROCHLORIDE 0.5 MG: 0.5 TABLET, FILM COATED ORAL at 08:59

## 2021-07-22 NOTE — CONSULTS
[facility]  General ID Service Consult      Patient: Elizabeth Kelley  YOB: 1945, MRN: [id number]  Date of Admission:  7/21/2021  Date of Consult: 07/22/2021  Consult Requested by: Luke Gibson MD  Admission Diagnosis: Cellulitis of right lower extremity [L03.115]      ID Assessment & Plan   Cellulitis right lower extremity  Weeping ulcers  Lymphedema  Recent culture with MRSA Pseudomonas Enterococcus and Proteus    PLAN   IV vancomycin and Zosyn, and then hopefully oral antibiotics  Likely will need 3 days  Wound care  Leg elevation    Blayne Parham M.D.      Blayne Parham MD  [facility]  ______________________________________________________________________        History of Present Illness   This is a 75 years old female with history of lymphedema, apparently caused by obesity but also lymph node resection per patient, nonhealing leg ulcers right lower extremity since surgery, admitted with worsening pain and discomfort below the knee and weeping.  She is not clear about fever or chills.  She was seen in vascular clinic and had cultures done with results showing MRSA Pseudomonas Enterococcus Proteus.  She was started on clindamycin.  Symptoms continued.  She reports prior cellulitis but 9 years ago of the other leg.  Otherwise no coughing shortness of breath rash change in mental status etc.        Review of Systems   All 12 systems reviewed, negative except for the above.      Past Medical History    Past Medical History:   Diagnosis Date     Ankle contracture, left      Class 3 severe obesity due to excess calories without serious comorbidity with body mass index (BMI) of 45.0 to 49.9 in adult (H)      Combined gastric and duodenal ulcer      Depression      Dyslipidemia      Edema      Gait abnormality      GERD (gastroesophageal reflux disease)      Lymphedema of both lower extremities      Melanoma (H)     left upper arm     MS  (multiple sclerosis) (H)      RLS (restless legs syndrome)      Skin ulcer of left lower leg (H)      Valgus deformity of both feet      Vitamin D deficiency        Past Surgical History   Past Surgical History:   Procedure Laterality Date     ABLATION SAPHENOUS VEIN W/ RFA Right 2021    Saphenous vein, anterior accessory saphenous vein, sclerotherapy of multiple veins.     COSMETIC SURGERY      reduction of excess skin from weight loss     ESOPHAGOSCOPY, GASTROSCOPY, DUODENOSCOPY (EGD), COMBINED N/A 2015    Procedure: UPPER ENDOSCOPY with gastric biopsy;  Surgeon: Checo Fletcher MD;  Location: [facility];  Service:      MAMMOPLASTY REDUCTION Bilateral      MOHS MICROGRAPHIC PROCEDURE      left upper arm, melanoma     SPINE SURGERY      L5 area surgery, decompression       Social History   Social History     Tobacco Use     Smoking status: Former Smoker     Packs/day: 0.25     Years: 12.00     Pack years: 3.00     Types: Cigarettes     Quit date: 1975     Years since quittin.6     Smokeless tobacco: Never Used     Tobacco comment: quit more than 30 years ago   Substance Use Topics     Alcohol use: Yes     Alcohol/week: 1.0 standard drinks     Drug use: No       Family History   I have reviewed this patient's family history and updated it with pertinent information if needed.  Family History   Problem Relation Age of Onset     Uterine Cancer Mother      Hyperlipidemia Mother      Hypertension Mother      Multiple Sclerosis Mother      Asthma Sister      Obesity Sister      Hyperlipidemia Sister      Hypertension Sister      Multiple Sclerosis Sister      Arthritis Sister      Colon Cancer Paternal Aunt      Breast Cancer Paternal Aunt      Hypertension Paternal Aunt      Hyperlipidemia Paternal Aunt      Brain Cancer Father      Arthritis Maternal Uncle      Arthritis Maternal Grandmother      Early Death Maternal Grandfather      Arthritis Paternal Grandmother      Arthritis  Paternal Grandfather        Medications   I have reviewed this patient's current medications    Allergies   Allergies   Allergen Reactions     Other Environmental Allergy Anaphylaxis     Bees/Wasps Stings     Adhesive [Mecrylate] Unknown     Other reaction(s): sores     Vicodin [Hydrocodone-Acetaminophen] Nausea and Vomiting and Hives       Physical Exam   Vital Signs: Temp: 98.8  F (37.1  C) Temp src: Oral BP: 127/60 Pulse: 89   Resp: 16 SpO2: 97 % O2 Device: None (Room air)    Weight: 251 lbs .1 oz    Gen. appearance nontoxic  Eyes no conjunctivitis or icterus  Neck no stiffness  Heart  bilat edema  Lungs  no wheeze  Abdomen not distended  Extremities bilat lymphedema...circunfrential ulcerations with greenish Discharge redness tenderness  Skin  no rash or emboli  Neurologic alert oriented no focal deficits        Data   Inflammatory Markers   Recent Labs   Lab Test 07/22/21  0757 07/21/21  1855 09/14/19  0046   CRP 6.5* 7.7* 0.3        Hematology Studies   Recent Labs   Lab Test 07/22/21  0757 07/21/21  1855 09/14/19  0046   WBC 10.0 9.0 8.4   HGB 9.6* 11.8 12.6   MCV 85 85 91    397 130*       Metabolic Studies   Recent Labs   Lab Test 07/22/21  0757 07/21/21  1855 09/26/20  1017 09/14/19  0046 05/02/19  0944    141 141 141 141   POTASSIUM 4.0 5.2* 3.9 4.7 4.0   CHLORIDE 107 103 103 103 105   CO2 26 26 30 28 24   BUN 17 23 24 23 25   CR 0.74 0.82 0.79 0.83 0.77   GFRESTIMATED 81 71 >60 >60 >60       Hepatic Studies    Recent Labs   Lab Test 09/26/20  1017 05/02/19  0944 06/14/18  1024   BILITOTAL 0.4 0.4 0.5   ALKPHOS 83 88 82   ALBUMIN 3.9 3.9 4.1   AST 16 22 19   ALT 22 26 25       Most Recent 6 Bacteria Isolates From Any Culture (See EPIC Reports for Culture Details):No lab results found.    Urine Studies  No lab results found.    Vancomycin Levels  No lab results found.    Invalid input(s): VANCO    Hepatitis B Testing No lab results found.  Hepatitis C Testing   No results found for: HCVAB,  HQTG, HCGENO, HCPCR, HQTRNA, HEPRNA  HIVTesting No lab results found.    Respiratory Virus Testing    No results found for: RS, FLUAG  COVID-19 Antibody Results, Testing for Immunity    COVID-19 Antibody Results, Testing for Immunity   No data to display.         COVID-19 PCR Results    COVID-19 PCR Results 7/21/21   SARS CoV2 PCR Negative      Comments are available for some flowsheets but are not being displayed.

## 2021-07-22 NOTE — PLAN OF CARE
Problem: Adult Inpatient Plan of Care  Goal: Plan of Care Review  7/22/2021 1845 by Bita Harman, RN  Outcome: Improving  Flowsheets (Taken 7/22/2021 1845)  Plan of Care Reviewed With: patient  Progress: improving  7/22/2021 1844 by Bita Harman, RN  Outcome: Improving       Vitals stable. Pain has been controlled with oral oxycodone. Morphine given x1 before ultrasound. Wound care changed x1 per wound care orders.

## 2021-07-22 NOTE — CONSULTS
Wound Ostomy  WOC Assessment       Allergies:  Other Environmental Allergy  Adhesive [Mecrylate]  Vicodin [Hydrocodone-Acetaminophen]    Diagnosis:   Patient Active Problem List    Diagnosis Date Noted     Cellulitis of right lower extremity 07/21/2021     Priority: Medium     History of malignant melanoma of skin 07/05/2021     Priority: Medium     Edema 07/05/2021     Priority: Medium     Major depression, single episode, in complete remission (H) 07/05/2021     Priority: Medium     Menopausal and postmenopausal disorder 07/05/2021     Priority: Medium     Mixed hyperlipidemia 07/05/2021     Priority: Medium     Morbid obesity (H) 07/05/2021     Priority: Medium     Peripheral venous insufficiency 07/05/2021     Priority: Medium     Postmenopausal 07/05/2021     Priority: Medium     Restless legs 07/05/2021     Priority: Medium     Depression 04/28/2021     Priority: Medium     Vitamin D deficiency 04/28/2021     Priority: Medium     GERD (gastroesophageal reflux disease) 04/28/2021     Priority: Medium     Essential hypertension 04/28/2021     Priority: Medium     Valgus deformity of both feet 09/21/2016     Priority: Medium     Flat feet, bilateral 09/21/2016     Priority: Medium     Gait abnormality 09/21/2016     Priority: Medium     MS (multiple sclerosis) (H) 09/21/2016     Priority: Medium     Morbid obesity with BMI of 50.0-59.9, adult (H) 04/27/2016     Priority: Medium     Venous hypertension of lower extremity, bilateral 12/23/2015     Priority: Medium     Acquired lymphedema of leg 12/23/2015     Priority: Medium     Scar condition and fibrosis of skin 12/23/2015     Priority: Medium     Ankle contracture, left 12/23/2015     Priority: Medium       Height:  [unfilled]    Weight:  [unfilled]    Labs:  Recent Labs   Lab Test 07/22/21  0757 09/26/20  1017   CRP 6.5*  --    HGB 9.6*  --    ALBUMIN  --  3.9       Ajith:  Ajith Score: 20    Specialty Bed:       Wound culture obtained: No    Edema:  Yes:   Localized    Anatomic Site/Laterality: RLE    Reason for ongoing care:   assessment    Encounter Type:  Initial Wound Type:   Denudement:  Foreign body present? No    Tissue Damage:   Exposed fat layer    Related trauma: patient is admitted for cellulitis of her right lower extremity. She has chronic non-healing wounds that are almost 100% circumferential on the leg. She has been seen by vascular surgery and when she went in yesterday to the clinic for wound care they suggested she go to the hospital for treatment     Assessment:          Tunneling/Undermining: No    Wound Bed: 25% Agranular, 50% Fibrin and 25% Slough    Exudate: Yes Serous Copious    Periwound Skin: Dry/Flaky, Edema, Erythema and Painful    Treatment Plan: viscopaste which patient states she is very familiar with; she has used it in the past with great results and does not know why they stopped it.  Patient scheduled for US of bilateral LE's so dressings were not applied as they need access to the wounds. Patient feels sure she can talk the nurses through the dressing application process             Nursing care provided was coordinated care with nursing, ordered supplies.    Discussed plan of care with nurse and patient.    Outcomes and treatment recommendations are to contain exudate and promote wound healing.    Actions taken by WOC RN: 15 minutes of education and WOC Discharge recommendations entered.    Planned Follow Up: Early next week.    Plan for next visit: Reassess wound(s)

## 2021-07-22 NOTE — PHARMACY-ADMISSION MEDICATION HISTORY
Pharmacy Note - Admission Medication History    Pertinent Provider Information:      ______________________________________________________________________    Prior To Admission (PTA) med list completed and updated in EMR.       Prior to Admission Medications   Prescriptions Last Dose Informant Patient Reported? Taking?   aspirin 81 mg chewable tablet 7/20/2021 at Unknown time  Yes Yes   Sig: Take 81 mg by mouth every evening    buPROPion (WELLBUTRIN SR) 150 MG 12 hr tablet 7/20/2021 at Unknown time  Yes Yes   Sig: Take 150 mg by mouth every morning    cholecalciferol, vitamin D3, 1,000 unit tablet 7/21/2021 at Unknown time  Yes Yes   Sig: [CHOLECALCIFEROL, VITAMIN D3, 1,000 UNIT TABLET] Take 2,000 Units by mouth daily.   citalopram (CELEXA) 40 MG tablet 7/21/2021 at Unknown time  Yes Yes   Sig: Take 40 mg by mouth every morning    furosemide (LASIX) 20 MG tablet 7/21/2021 at AM  Yes Yes   Sig: Take 20 mg by mouth 2 times daily AM and afternoon (4PM)   multivitamin therapeutic (THERAGRAN) tablet 7/21/2021 at Unknown time  Yes Yes   Sig: [MULTIVITAMIN THERAPEUTIC (THERAGRAN) TABLET] Take 1 tablet by mouth daily.   naproxen sodium (ALEVE) 220 MG tablet   Yes Yes   Sig: Take 440 mg by mouth daily as needed    pantoprazole (PROTONIX) 40 MG tablet 7/21/2021 at AM  Yes Yes   Sig: [PANTOPRAZOLE (PROTONIX) 40 MG TABLET] Take 40 mg by mouth daily.   potassium chloride ER (KLOR-CON M) 20 MEQ CR tablet 7/20/2021 at Unknown time  Yes Yes   Sig: Take 20 mEq by mouth every evening   potassium chloride SA (K-DUR,KLOR-CON) 20 MEQ tablet 7/21/2021 at Unknown time  Yes Yes   Sig: Take 40 mEq by mouth every morning    rOPINIRole (REQUIP) 1 MG tablet 7/21/2021 at AM  Yes Yes   Sig: Take 0.5 mg by mouth 2 times daily AM and afternoon   rOPINIRole (REQUIP) 1 MG tablet 7/20/2021 at Unknown time  Yes Yes   Sig: Take 1 mg by mouth At Bedtime   simvastatin (ZOCOR) 40 MG tablet 7/20/2021 at Unknown time  Yes Yes   Sig: [SIMVASTATIN (ZOCOR) 40  MG TABLET] Take 40 mg by mouth bedtime.   spironolactone (ALDACTONE) 25 MG tablet 7/21/2021 at Unknown time  Yes Yes   Sig: [SPIRONOLACTONE (ALDACTONE) 25 MG TABLET] Take 25 mg by mouth daily.   traMADol (ULTRAM) 50 MG tablet   Yes Yes   Sig: Take 50 mg by mouth every 6 hours as needed for severe pain      Facility-Administered Medications: None       Information source(s): Patient and CareEverywhere/SureScripts  Method of interview communication: in-person    Summary of Changes to PTA Med List  New: none  Discontinued: ketorolac, lidocaine gel  Changed: furosemide, aleve, potassium, ropinirole    Patient was asked about OTC/herbal products specifically.  PTA med list reflects this.    In the past week, patient estimated taking medication this percent of the time:  greater than 90%.    Allergies were reviewed, assessed, and updated with the patient.      Patient does not use any multi-dose medications prior to admission.    The information provided in this note is only as accurate as the sources available at the time of the update(s).    Thank you for the opportunity to participate in the care of this patient.    Radha Yi RPH  7/21/2021 10:21 PM

## 2021-07-22 NOTE — CONSULTS
Care Management Initial Consult    General Information  Assessment completed with: Patient,    Type of CM/SW Visit: Initial Assessment    Primary Care Provider verified and updated as needed: Yes   Readmission within the last 30 days: no previous admission in last 30 days      Reason for Consult: discharge planning  Advance Care Planning: Advance Care Planning Reviewed: education/resources on health care directives provided          Communication Assessment  Patient's communication style: spoken language (English or Bilingual)    Hearing Difficulty or Deaf: no   Wear Glasses or Blind: no    Cognitive  Cognitive/Neuro/Behavioral: WDL                      Living Environment:   People in home: significant other     Current living Arrangements: house      Able to return to prior arrangements: yes       Family/Social Support:  Care provided by: self  Provides care for: no one  Marital Status: Lives with Significant Other  Significant Other, Children       [facility]  Description of Support System: Supportive    Support Assessment: Adequate family and caregiver support    Current Resources:   Patient receiving home care services: No     Community Resources: None  Equipment currently used at home: cane, straight  Supplies currently used at home: None    Employment/Financial:  Employment Status: employed full-time        Financial Concerns: other (see comments) (BCBS Medicare Advantage)           Lifestyle & Psychosocial Needs:  Social Determinants of Health     Tobacco Use: Medium Risk     Smoking Tobacco Use: Former Smoker     Smokeless Tobacco Use: Never Used   Alcohol Use:      Frequency of Alcohol Consumption:      Average Number of Drinks:      Frequency of Binge Drinking:    Financial Resource Strain:      Difficulty of Paying Living Expenses:    Food Insecurity:      Worried About Running Out of Food in the Last Year:      Ran Out of Food in the Last Year:    Transportation Needs:      Lack of Transportation (Medical):       Lack of Transportation (Non-Medical):    Physical Activity:      Days of Exercise per Week:      Minutes of Exercise per Session:    Stress:      Feeling of Stress :    Social Connections:      Frequency of Communication with Friends and Family:      Frequency of Social Gatherings with Friends and Family:      Attends [employer] Services:      Active Member of Clubs or Organizations:      Attends Club or Organization Meetings:      Marital Status:    Intimate Partner Violence:      Fear of Current or Ex-Partner:      Emotionally Abused:      Physically Abused:      Sexually Abused:    Depression:      PHQ-2 Score:    Housing Stability:      Unable to Pay for Housing in the Last Year:      Number of Places Lived in the Last Year:      Unstable Housing in the Last Year:        Functional Status:  Prior to admission patient needed assistance:              Mental Health Status:          Chemical Dependency Status:                Values/Beliefs:  Spiritual, Cultural Beliefs, [employer] Practices, Values that affect care: yes  Description of Beliefs that Will Affect Care: The patient has requested to have a  stop by            Additional Information:  The patient is fom a home in [place], where she lives with her significant other, Ayaz. At baseline, she is independent with ADLs/IADLs, but she does use a cane when walking longer distances. The patient's goal for discharge is to return home. Will need PT and OT recommendations. The patient is being followed by WOC and ID. Please refer to their notes for potential discharge needs. [facility] can provide discharge transportation.    Sreekanth Preciado RN

## 2021-07-22 NOTE — H&P
"Hospital Medicine Service History and Physical  [facility]: [facility]    Elizabeth Kelley is a 75 year old female who  has a past medical history of Ankle contracture, left, Class 3 severe obesity due to excess calories without serious comorbidity with body mass index (BMI) of 45.0 to 49.9 in adult (H), Combined gastric and duodenal ulcer, Depression, Dyslipidemia, Edema, Gait abnormality, GERD (gastroesophageal reflux disease), Lymphedema of both lower extremities, Melanoma (H), MS (multiple sclerosis) (H), RLS (restless legs syndrome), Skin ulcer of left lower leg (H), Valgus deformity of both feet, and Vitamin D deficiency.   Chief Complaint: Leg erythema, swelling    Assessment and Plan  Cellulitis  Circumferential involvement right lower extremity  Sees outpatient wound care  Failed recent outpatient antibiotics  Continue vancomycin, Zosyn  Wound care consult  Rule out DVT    Hyperkalemia  Take supplements while on Lasix  Mild elevation, repeat lab check in the morning  Small amount of IV fluids now    Home med rec pending    BMI 47    DVTP: Lovenox  Code Status: No Order confirmed full  Disposition: Observation   Estimated body mass index is 47.05 kg/m  as calculated from the following:    Height as of this encounter: 1.549 m (5' 1\").    Weight as of this encounter: 112.9 kg (249 lb).    History of Present Illness  Elizabeth Kelley says concerned about her right lower extremity chest with a longstanding wound attributed to her razor cut last fall which developed into a sore.  Has been seeing wound care clinic for delayed healing.  Today concerned that her leg looks \"raw\" and she is experiencing more pain.    Denies chest pain, shortness of breath  All other systems reviewed and are negative  In the ED, Elizabeth Kelley presents afebrile, slightly hypertensive, satting well on room air.  Potassium 5.2, no leukocytosis or anemia.  No imaging    Appears comfortable  Anicteric conjunctiva, PERRL, " wearing glasses  Moist mucous membranes, poor dentition  Trachea midline, no JVD  Clear to auscultation, Respiratory effort normal  Regular rate and rhythm, no murmur  Abdomen soft, nondistended, nontender  Significant bilateral pitting edema greater on the right edema, clubbing absent  Skin normal temperature, dry, large right leg circumferential wound with surrounding erythema wrapped in gauze without obvious purulence but with serous drainage, tenderness, minimal warmth  Normal affect, alert  Vital signs reviewed by me    Wt Readings from Last 4 Encounters:   07/21/21 112.9 kg (249 lb)   07/21/21 113.8 kg (250 lb 12.8 oz)   04/28/21 114.3 kg (252 lb)   09/21/16 105.2 kg (232 lb)        reports that she quit smoking about 45 years ago. Her smoking use included cigarettes. She has a 3.00 pack-year smoking history. She has never used smokeless tobacco. She reports current alcohol use of about 1.0 standard drinks of alcohol per week. She reports that she does not use drugs.  family history includes Arthritis in her maternal grandmother, maternal uncle, paternal grandfather, paternal grandmother, and sister; Asthma in her sister; Brain Cancer in her father; Breast Cancer in her paternal aunt; Colon Cancer in her paternal aunt; Early Death in her maternal grandfather; Hyperlipidemia in her mother, paternal aunt, and sister; Hypertension in her mother, paternal aunt, and sister; Multiple Sclerosis in her mother and sister; Obesity in her sister; Uterine Cancer in her mother.   has a past surgical history that includes Mammoplasty reduction (Bilateral); Cosmetic surgery (1988); Esophagoscopy, gastroscopy, duodenoscopy (EGD), combined (N/A, 03/30/2015); Spine surgery; Ablation Saphenous Vein W/ Rfa (Right, 04/12/2021); and Mohs micrographic procedure.   Allergies   Allergen Reactions     Other Environmental Allergy Anaphylaxis     Bees/Wasps Stings     Adhesive [Mecrylate] Unknown     Other reaction(s): sores     Vicodin  [Hydrocodone-Acetaminophen] Nausea and Vomiting and Rash       Allen Matthews MD, MPH  [facility]   Phone: #[phone]

## 2021-07-22 NOTE — PHARMACY-VANCOMYCIN DOSING SERVICE
"Pharmacy Vancomycin Initial Note  Date of Service 2021  Patient's  1945  75 year old, female    Indication: Skin and Soft Tissue Infection    Current estimated CrCl = Estimated Creatinine Clearance: 71.2 mL/min (based on SCr of 0.82 mg/dL).    Creatinine for last 3 days  2021:  6:55 PM Creatinine 0.82 mg/dL    Recent Vancomycin Level(s) for last 3 days  No results found for requested labs within last 72 hours.      Vancomycin IV Administrations (past 72 hours)                   vancomycin (VANCOCIN) 2,000 mg in sodium chloride 0.9 % 500 mL intermittent infusion (mg) 2,000 mg New Bag 21                Nephrotoxins and other renal medications (From now, onward)    Start     Dose/Rate Route Frequency Ordered Stop    21 220  vancomycin (VANCOCIN) 1,500 mg in sodium chloride 0.9 % 250 mL intermittent infusion      1,500 mg  over 90 Minutes Intravenous EVERY 24 HOURS 21 0601      21 0100  piperacillin-tazobactam (ZOSYN) 3.375 g vial to attach to  mL bag     Note to Pharmacy: For [facility], [facility] and [facility]: For Zosyn-naive patients, use the \"Zosyn initial dose + extended infusion\" order panel.    3.375 g  over 240 Minutes Intravenous EVERY 8 HOURS 21 2347      21 0100  furosemide (LASIX) tablet 20 mg      20 mg Oral 2 TIMES DAILY 21 0053            Contrast Orders - past 72 hours (72h ago, onward)    None          Loading dose: 2000 mg at 22:00 2021.  Regimen: 1500 mg IV every 24 hours.  Start time: 10:00 on 2021  Exposure target: AUC24 (range)400-600 mg/L.hr   AUC24,ss: 552 mg/L.hr  Probability of AUC24 > 400: 74 %  Ctrough,ss: 13.9 mg/L  Probability of Ctrough,ss > 20: 33 %  Probability of nephrotoxicity (Lodise LOUIS ): 9 %          Plan:  1. Start vancomycin  1500 mg IV q24h.   2. Vancomycin monitoring method: AUC  3. Vancomycin therapeutic monitoring goal: 400-600 mg*h/L  4. Pharmacy will check vancomycin levels as appropriate in 1-3 Days. "    5. Serum creatinine levels will be ordered daily for the first week of therapy and at least twice weekly for subsequent weeks.      Doris Guthrie RPH

## 2021-07-22 NOTE — UTILIZATION REVIEW
Admission Status; Secondary Review Determination   Under the authority of the Utilization Management Committee, the utilization review process indicated a secondary review on Elizabeth Kelley. The review outcome is based on review of the medical records, discussions with staff, and applying clinical experience noted on the date of the review.   (x) Inpatient Status Appropriate - This patient's medical care is consistent with medical management for inpatient care and reasonable inpatient medical practice.     RATIONALE FOR DETERMINATION   75 yr old female with hx recurrent cellulitisi, leg ulcers, obesity with BMI 47, MS admitted for RLE cellulitis.  S/P debridement 7/5/21 with cultures of MRSA/Pseudomonas resistant to Cipro/Enterococcus. Completed course of Clindamycin and admitted with worsening redness and pain.  [facility].  On Zosyn, Vanco.  WBC normal however CRP 7.  ID consultation requested given ongoing wound essentially since 10/2020 with more issues with polymicrobial infection with resistance and failed full course outpatient antibiotics.  Comorbidity of obesity and lymphedema complicating further.     At the time of admission with the information available to the attending physician more than 2 nights Hospital complex care was anticipated, based on patient risk of adverse outcome if treated as outpatient and complex care required. Inpatient admission is appropriate based on the Medicare guidelines.   The information on this document is developed by the utilization review team in order for the business office to ensure compliance. This only denotes the appropriateness of proper admission status and does not reflect the quality of care rendered.   The definitions of Inpatient Status and Observation Status used in making the determination above are those provided in the CMS Coverage Manual, Chapter 1 and Chapter 6, section 70.4.   Sincerely,   Anika Cotto,  MD  Utilization Review  Physician Advisor  [facility]

## 2021-07-22 NOTE — PROGRESS NOTES
[facility] MEDICINE PROGRESS NOTE      Identification/Summary: Elizabeth Kelley is a 75 year old female with a past medical history of recurrent cellulitis, leg ulcers, obesity, peptic ulcer disease, GERD, MS who was admitted on 7/21/2021 for right lower extremity cellulitis. Hospital course is notable for initiation of Zosyn and vancomycin.     Assessment and Plan:   Right lower extremity wound/cellulitis status post debridement 7/5/2021  Wound cultures from 7/5/2021: MRSA,  Pseudomonas resistant to Cipro, Enterococcus.  Patient completed 7-day course of clindamycin from 7/8/2021 to 7/15/2021, return with worsening redness and pain in the leg  Zosyn/vancomycin  Ultrasound pending  Wound ostomy consult  Infectious disease consult for recommendation on antibiotics     hyperkalemia  Takes supplements, now resolved  On Aldactone and furosemide    Mood disorder/depression  Wellbutrin  mg daily  Celexa 40 mg daily    Hyperlipidemia  Statin    History of peptic ulcer disease/GERD  PPI    Restless leg  Ropinirole        Diet: Regular Diet Adult  DVT Prophylaxis:  Enoxaparin (Lovenox) SQ  Code Status: Full Code    Anticipated possible discharge in 2 -3 days  once improved infeciton and ID recommendations for discharge milestones are met.    Interval History/Subjective:  Patient reports feeling pain in the R leg, no fever or chills   No n/v/d   Chart reviewed     Physical Exam/Objective:  Temp:  [97.5  F (36.4  C)-98.8  F (37.1  C)] 98.8  F (37.1  C)  Pulse:  [76-89] 89  Resp:  [16-17] 16  BP: (110-198)/(60-86) 127/60  SpO2:  [97 %-100 %] 97 %    Body mass index is 47.43 kg/m .  General: Awake alert and comfortable  Lungs: CTA without rales or rhonchi  Heart: S1, S2 without murmurs  Abdomen: Soft nontender, bowel sounds positive  CNS: Nonfocal  Extremities: RLE dressing,   Redness, ulceration    Medications:   Personally Reviewed.  Medications       aspirin  81 mg Oral QPM     buPROPion  150  mg Oral QAM     citalopram  40 mg Oral QAM     enoxaparin ANTICOAGULANT  40 mg Subcutaneous Q12H     furosemide  20 mg Oral BID     pantoprazole  40 mg Oral Daily     piperacillin-tazobactam  3.375 g Intravenous Q8H     rOPINIRole  0.5 mg Oral BID     rOPINIRole  1 mg Oral At Bedtime     simvastatin  40 mg Oral At Bedtime     sodium chloride (PF)  3 mL Intracatheter Q8H     spironolactone  25 mg Oral Daily     vancomycin  1,500 mg Intravenous Q24H     Vitamin D3  2,000 Units Oral Daily       Data reviewed today: I personally reviewed all new medications, labs, imaging/diagnostics reports over the past 24 hours. Pertinent findings include:    Imaging:   No results found for this or any previous visit (from the past 24 hour(s)).    Labs:  Most Recent 3 CBC's:Recent Labs   Lab Test 07/22/21  0757 07/21/21  1855 09/14/19  0046   WBC 10.0 9.0 8.4   HGB 9.6* 11.8 12.6   MCV 85 85 91    397 130*     Most Recent 3 BMP's:Recent Labs   Lab Test 07/22/21  0757 07/21/21  1855 09/26/20  1017    141 141   POTASSIUM 4.0 5.2* 3.9   CHLORIDE 107 103 103   CO2 26 26 30   BUN 17 23 24   CR 0.74 0.82 0.79   ANIONGAP 7 12 8   JUNI 8.5 9.7 9.4   GLC 99 91 92     Most Recent 2 LFT's:Recent Labs   Lab Test 09/26/20  1017 05/02/19  0944   AST 16 22   ALT 22 26   ALKPHOS 83 88   BILITOTAL 0.4 0.4       Luke Gibson MD  Hospitalist  [facility]  [facility]  Phone: #[phone]

## 2021-07-23 LAB
HOLD SPECIMEN: NORMAL
HOLD SPECIMEN: NORMAL
VANCOMYCIN SERPL-MCNC: 14.5 MG/L

## 2021-07-23 PROCEDURE — 99232 SBSQ HOSP IP/OBS MODERATE 35: CPT | Performed by: INTERNAL MEDICINE

## 2021-07-23 PROCEDURE — 258N000003 HC RX IP 258 OP 636: Performed by: HOSPITALIST

## 2021-07-23 PROCEDURE — 120N000001 HC R&B MED SURG/OB

## 2021-07-23 PROCEDURE — 36415 COLL VENOUS BLD VENIPUNCTURE: CPT | Performed by: INTERNAL MEDICINE

## 2021-07-23 PROCEDURE — 80202 ASSAY OF VANCOMYCIN: CPT | Performed by: INTERNAL MEDICINE

## 2021-07-23 PROCEDURE — 250N000013 HC RX MED GY IP 250 OP 250 PS 637: Performed by: HOSPITALIST

## 2021-07-23 PROCEDURE — 250N000011 HC RX IP 250 OP 636: Performed by: HOSPITALIST

## 2021-07-23 RX ADMIN — PIPERACILLIN AND TAZOBACTAM 3.38 G: 3; .375 INJECTION, POWDER, LYOPHILIZED, FOR SOLUTION INTRAVENOUS at 06:05

## 2021-07-23 RX ADMIN — TRAMADOL HYDROCHLORIDE 50 MG: 50 TABLET, FILM COATED ORAL at 01:01

## 2021-07-23 RX ADMIN — SPIRONOLACTONE 25 MG: 25 TABLET, FILM COATED ORAL at 08:35

## 2021-07-23 RX ADMIN — FUROSEMIDE 20 MG: 20 TABLET ORAL at 21:02

## 2021-07-23 RX ADMIN — ENOXAPARIN SODIUM 40 MG: 100 INJECTION SUBCUTANEOUS at 13:35

## 2021-07-23 RX ADMIN — PANTOPRAZOLE SODIUM 40 MG: 20 TABLET, DELAYED RELEASE ORAL at 08:35

## 2021-07-23 RX ADMIN — SIMVASTATIN 40 MG: 40 TABLET, FILM COATED ORAL at 21:02

## 2021-07-23 RX ADMIN — PIPERACILLIN AND TAZOBACTAM 3.38 G: 3; .375 INJECTION, POWDER, LYOPHILIZED, FOR SOLUTION INTRAVENOUS at 14:30

## 2021-07-23 RX ADMIN — ASPIRIN 81 MG CHEWABLE TABLET 81 MG: 81 TABLET CHEWABLE at 21:02

## 2021-07-23 RX ADMIN — OXYCODONE HYDROCHLORIDE 5 MG: 5 TABLET ORAL at 07:00

## 2021-07-23 RX ADMIN — PIPERACILLIN AND TAZOBACTAM 3.38 G: 3; .375 INJECTION, POWDER, LYOPHILIZED, FOR SOLUTION INTRAVENOUS at 22:45

## 2021-07-23 RX ADMIN — ROPINIROLE HYDROCHLORIDE 1 MG: 1 TABLET, FILM COATED ORAL at 21:03

## 2021-07-23 RX ADMIN — VANCOMYCIN HYDROCHLORIDE 1500 MG: 5 INJECTION, POWDER, LYOPHILIZED, FOR SOLUTION INTRAVENOUS at 22:42

## 2021-07-23 RX ADMIN — FUROSEMIDE 20 MG: 20 TABLET ORAL at 08:36

## 2021-07-23 RX ADMIN — ENOXAPARIN SODIUM 40 MG: 100 INJECTION SUBCUTANEOUS at 01:01

## 2021-07-23 RX ADMIN — OXYCODONE HYDROCHLORIDE 5 MG: 5 TABLET ORAL at 21:02

## 2021-07-23 RX ADMIN — ROPINIROLE HYDROCHLORIDE 0.5 MG: 0.5 TABLET, FILM COATED ORAL at 16:32

## 2021-07-23 RX ADMIN — CITALOPRAM HYDROBROMIDE 40 MG: 20 TABLET ORAL at 08:36

## 2021-07-23 RX ADMIN — Medication 2000 UNITS: at 08:37

## 2021-07-23 RX ADMIN — BUPROPION HYDROCHLORIDE 150 MG: 150 TABLET, FILM COATED, EXTENDED RELEASE ORAL at 08:35

## 2021-07-23 RX ADMIN — OXYCODONE HYDROCHLORIDE 5 MG: 5 TABLET ORAL at 13:38

## 2021-07-23 RX ADMIN — ROPINIROLE HYDROCHLORIDE 0.5 MG: 0.5 TABLET, FILM COATED ORAL at 08:35

## 2021-07-23 NOTE — PROGRESS NOTES
[facility] MEDICINE PROGRESS NOTE      Identification/Summary: Elizabeth Kelley is a 75 year old female with a past medical history of recurrent cellulitis, leg ulcers, obesity, peptic ulcer disease, GERD, MS who was admitted on 7/21/2021 for right lower extremity cellulitis. Hospital course is notable for initiation of Zosyn and vancomycin.     Assessment and Plan:   Right lower extremity wound/cellulitis status post debridement 7/5/2021  Wound cultures from 7/5/2021: MRSA,  Pseudomonas resistant to Cipro, Enterococcus.  Patient completed 7-day course of clindamycin from 7/8/2021 to 7/15/2021, return with worsening redness and pain in the leg  Zosyn/vancomycin x 3 days  Then oral abx pending ID recommendations   Ultrasound -ve for DVT   Wound ostomy consult appreciated   Infectious disease consult for recommendation on antibiotics - appreciated     hyperkalemia  Takes supplements, now resolved  On Aldactone and furosemide    Mood disorder/depression  Wellbutrin  mg daily  Celexa 40 mg daily    Hyperlipidemia  Statin    History of peptic ulcer disease/GERD  PPI    Restless leg  Ropinirole        Diet: Regular Diet Adult  DVT Prophylaxis:  Enoxaparin (Lovenox) SQ  Code Status: Full Code    Anticipated possible discharge 1 day pending final ID recs for discharge Abx   Interval History/Subjective:  Didn't sleep well. Doing well this AM   Denies any fever or chills of nausea or vomiting     Physical Exam/Objective:  Temp:  [98.1  F (36.7  C)-98.8  F (37.1  C)] 98.1  F (36.7  C)  Pulse:  [81-89] 81  Resp:  [16-20] 18  BP: (121-154)/(57-67) 141/65  SpO2:  [88 %-98 %] 93 %    Body mass index is 47.43 kg/m .  General: Awake alert and comfortable  Lungs: CTA without rales or rhonchi  Heart: S1, S2 without murmurs  Abdomen: Soft nontender, bowel sounds positive  CNS: Nonfocal  Extremities: RLE dressing,   Redness, ulceration    Redness at edges appears better,     Medications:   Personally  Reviewed.  Medications       aspirin  81 mg Oral QPM     buPROPion  150 mg Oral QAM     citalopram  40 mg Oral QAM     enoxaparin ANTICOAGULANT  40 mg Subcutaneous Q12H     furosemide  20 mg Oral BID     pantoprazole  40 mg Oral Daily     piperacillin-tazobactam  3.375 g Intravenous Q8H     rOPINIRole  0.5 mg Oral BID     rOPINIRole  1 mg Oral At Bedtime     simvastatin  40 mg Oral At Bedtime     sodium chloride (PF)  3 mL Intracatheter Q8H     spironolactone  25 mg Oral Daily     vancomycin  1,500 mg Intravenous Q24H     Vitamin D3  2,000 Units Oral Daily       Data reviewed today: I personally reviewed all new medications, labs, imaging/diagnostics reports over the past 24 hours. Pertinent findings include:    Imaging:   Recent Results (from the past 24 hour(s))   US Lower Extremity Venous Duplex Bilateral    Narrative    EXAM: US LOWER EXTREMITY VENOUS DUPLEX BILATERAL  LOCATION: [facility]  DATE/TIME: 7/22/2021 3:56 PM    INDICATION: edema, cellulitis  COMPARISON: None.  TECHNIQUE: Venous Duplex ultrasound of bilateral lower extremities with and without compression, augmentation and duplex. Color flow and spectral Doppler with waveform analysis performed. Thickened skin of the right calf with nonvisualization of the   peritoneal veins. Edematous/weeping skin of the left calf with known MRSA infection.    FINDINGS: Exam includes the common femoral, femoral, popliteal veins as well as segmentally visualized deep calf veins and greater saphenous vein.     RIGHT: No deep vein thrombosis. No superficial thrombophlebitis. No popliteal cyst.    LEFT: No deep vein thrombosis. No superficial thrombophlebitis. Complex fluid collection medial to the left knee measures 8 mm in thickness and 6.2 cm in length.      Impression    IMPRESSION:    1.  Suboptimal assessment of calf veins due to technical limitations as described. No acute DVT in the imaged veins of the bilateral lower extremities.  2.   Mildly complex left popliteal fossa cyst.       Labs:  Most Recent 3 CBC's:  Recent Labs   Lab Test 07/22/21  0757 07/21/21  1855 09/14/19  0046   WBC 10.0 9.0 8.4   HGB 9.6* 11.8 12.6   MCV 85 85 91    397 130*     Most Recent 3 BMP's:  Recent Labs   Lab Test 07/22/21  0757 07/21/21  1855 09/26/20  1017    141 141   POTASSIUM 4.0 5.2* 3.9   CHLORIDE 107 103 103   CO2 26 26 30   BUN 17 23 24   CR 0.74 0.82 0.79   ANIONGAP 7 12 8   JUNI 8.5 9.7 9.4   GLC 99 91 92     Most Recent 2 LFT's:  Recent Labs   Lab Test 09/26/20  1017 05/02/19  0944   AST 16 22   ALT 22 26   ALKPHOS 83 88   BILITOTAL 0.4 0.4       Luke Gibson MD  [facility]  Phone: #[phone]

## 2021-07-23 NOTE — PROGRESS NOTES
"Pharmacy Vancomycin Note  Date of Service 2021  Patient's  1945   75 year old, female    Indication: Skin and Soft Tissue Infection  Day of Therapy: 3  Current vancomycin regimen:  1500 mg IV q24h  Current vancomycin monitoring method: AUC  Current vancomycin therapeutic monitoring goal: 400-600 mg*h/L    Current estimated CrCl = Estimated Creatinine Clearance: 79.3 mL/min (based on SCr of 0.74 mg/dL).    Creatinine for last 3 days  2021:  6:55 PM Creatinine 0.82 mg/dL  2021:  7:57 AM Creatinine 0.74 mg/dL    Recent Vancomycin Levels (past 3 days)  2021:  7:43 AM Vancomycin 14.5 mg/L    Vancomycin IV Administrations (past 72 hours)                   vancomycin (VANCOCIN) 1,500 mg in sodium chloride 0.9 % 250 mL intermittent infusion (mg) 1,500 mg New Bag 21 220    vancomycin (VANCOCIN) 2,000 mg in sodium chloride 0.9 % 500 mL intermittent infusion (mg) 2,000 mg New Bag 21 222                Nephrotoxins and other renal medications (From now, onward)    Start     Dose/Rate Route Frequency Ordered Stop    21 220  vancomycin (VANCOCIN) 1,500 mg in sodium chloride 0.9 % 250 mL intermittent infusion      1,500 mg  over 90 Minutes Intravenous EVERY 24 HOURS 21 0601      21 0100  piperacillin-tazobactam (ZOSYN) 3.375 g vial to attach to  mL bag     Note to Pharmacy: For [facility], [facility] and [facility]: For Zosyn-naive patients, use the \"Zosyn initial dose + extended infusion\" order panel.    3.375 g  over 240 Minutes Intravenous EVERY 8 HOURS 21 2347      21 0100  furosemide (LASIX) tablet 20 mg      20 mg Oral 2 TIMES DAILY 21 0053               Contrast Orders - past 72 hours (72h ago, onward)    None          Interpretation of levels and current regimen:  Vancomycin level is reflective of -600    Has serum creatinine changed greater than 50% in last 72 hours: No      Renal Function: Stable    Regimen: 1500 mg IV every 24 hours.  Start time: " 22:05 on 07/23/2021  Exposure target: AUC24 (range)400-600 mg/L.hr   AUC24,ss: 501 mg/L.hr  Probability of AUC24 > 400: 67 %  Ctrough,ss: 11.9 mg/L  Probability of Ctrough,ss > 20: 28 %  Probability of nephrotoxicity (Lodise LOUIS 2009): 7 %    Plan:  1. Continue Current Dose 1500mg IV q24h  2. Vancomycin monitoring method: AUC  3. Vancomycin therapeutic monitoring goal: 400-600 mg*h/L  4. Pharmacy will check vancomycin levels as appropriate in 1-3 Days.    Gasper Calvin RPH

## 2021-07-23 NOTE — PROGRESS NOTES
[facility]  Infectious Disease   Progress Note     Date of Admission:  7/21/2021    Assessment & Plan   Cellulitis right lower extremity, improving  Weeping ulcers  Lymphedema  Recent culture with MRSA Pseudomonas Enterococcus and Proteus     PLAN   IV vancomycin and Zosyn, continue today-  and then hopefully oral antibiotics  Likely will need 3 days  Wound care  Leg elevation  CRP    Blayne Parham M.D.        ____________________________________________________    Interval History   Alert  Comfortable, feels better  No nausea vomiting diarrhea rash or itching    Physical Exam   Vital Signs: Temp: 98.1  F (36.7  C) Temp src: Oral BP: (!) 141/65 Pulse: 81   Resp: 18 SpO2: 93 % O2 Device: None (Room air)    Weight: 251 lbs .1 oz  Gen. appearance nontoxic  Eyes no conjunctivitis or icterus  Neck no stiffness  Heart  bilat edema  Lungs  no wheeze  Abdomen not distended  Extremities bilat lymphedema...circunfrential ulcerations with greenish Discharge redness tenderness  Skin  no rash or emboli  Neurologic alert oriented no focal deficits    Results for LELO ALVAREZ (MRN [id number]) as of 7/23/2021 12:23   Ref. Range 7/22/2021 07:57   WBC Latest Ref Range: 4.0 - 11.0 10e3/uL 10.0   Hemoglobin Latest Ref Range: 11.7 - 15.7 g/dL 9.6 (L)   Hematocrit Latest Ref Range: 35.0 - 47.0 % 31.5 (L)   Platelet Count Latest Ref Range: 150 - 450 10e3/uL 322   RBC Count Latest Ref Range: 3.80 - 5.20 10e6/uL 3.71 (L)   MCV Latest Ref Range: 78 - 100 fL 85   MCH Latest Ref Range: 26.5 - 33.0 pg 25.9 (L)   MCHC Latest Ref Range: 31.5 - 36.5 g/dL 30.5 (L)   RDW Latest Ref Range: 10.0 - 15.0 % 14.6     Data   Results for orders placed or performed during the hospital encounter of 07/21/21 (from the past 24 hour(s))   Social Work/ Care Management IP Consult    Narrative    Sreekanth Preciado RN     7/22/2021  2:47 PM  Care Management Initial Consult    General Information  Assessment completed with: Patient,     Type of CM/SW Visit: Initial Assessment    Primary Care Provider verified and updated as needed: Yes   Readmission within the last 30 days: no previous admission in   last 30 days      Reason for Consult: discharge planning  Advance Care Planning: Advance Care Planning Reviewed:   education/resources on health care directives provided          Communication Assessment  Patient's communication style: spoken language (English or   Bilingual)    Hearing Difficulty or Deaf: no   Wear Glasses or Blind: no    Cognitive  Cognitive/Neuro/Behavioral: WDL                      Living Environment:   People in home: significant other     Current living Arrangements: house      Able to return to prior arrangements: yes       Family/Social Support:  Care provided by: self  Provides care for: no one  Marital Status: Lives with Significant Other  Significant Other, Radha Ribera  Description of Support System: Supportive    Support Assessment: Adequate family and caregiver support    Current Resources:   Patient receiving home care services: No     Community Resources: None  Equipment currently used at home: cane, straight  Supplies currently used at home: None    Employment/Financial:  Employment Status: employed full-time        Financial Concerns: other (see comments) (BCBS Medicare   Advantage)           Lifestyle & Psychosocial Needs:  Social Determinants of Health     Tobacco Use: Medium Risk     Smoking Tobacco Use: Former Smoker     Smokeless Tobacco Use: Never Used   Alcohol Use:      Frequency of Alcohol Consumption:      Average Number of Drinks:      Frequency of Binge Drinking:    Financial Resource Strain:      Difficulty of Paying Living Expenses:    Food Insecurity:      Worried About Running Out of Food in the Last Year:      Ran Out of Food in the Last Year:    Transportation Needs:      Lack of Transportation (Medical):      Lack of Transportation (Non-Medical):    Physical Activity:      Days of  Exercise per Week:      Minutes of Exercise per Session:    Stress:      Feeling of Stress :    Social Connections:      Frequency of Communication with Friends and Family:      Frequency of Social Gatherings with Friends and Family:      Attends Yazidism Services:      Active Member of Clubs or Organizations:      Attends Club or Organization Meetings:      Marital Status:    Intimate Partner Violence:      Fear of Current or Ex-Partner:      Emotionally Abused:      Physically Abused:      Sexually Abused:    Depression:      PHQ-2 Score:    Housing Stability:      Unable to Pay for Housing in the Last Year:      Number of Places Lived in the Last Year:      Unstable Housing in the Last Year:        Functional Status:  Prior to admission patient needed assistance:              Mental Health Status:          Chemical Dependency Status:                Values/Beliefs:  Spiritual, Cultural Beliefs, Yazidism Practices, Values that   affect care: yes  Description of Beliefs that Will Affect Care:   The patient has requested to have a  stop by            Additional Information:  The patient is fom a home in [place], where she lives   with her significant other, Ayaz. At baseline, she is   independent with ADLs/IADLs, but she does use a cane when walking   longer distances. The patient's goal for discharge is to return   home. Will need PT and OT recommendations. The patient is being   followed by [facility] and ID. Please refer to their notes for potential   discharge needs. [facility] can provide discharge transportation.    Sreekanth Preciado RN         US Lower Extremity Venous Duplex Bilateral    Narrative    EXAM: US LOWER EXTREMITY VENOUS DUPLEX BILATERAL  LOCATION: [facility]  DATE/TIME: 7/22/2021 3:56 PM    INDICATION: edema, cellulitis  COMPARISON: None.  TECHNIQUE: Venous Duplex ultrasound of bilateral lower extremities with and without compression, augmentation and duplex.  Color flow and spectral Doppler with waveform analysis performed. Thickened skin of the right calf with nonvisualization of the   peritoneal veins. Edematous/weeping skin of the left calf with known MRSA infection.    FINDINGS: Exam includes the common femoral, femoral, popliteal veins as well as segmentally visualized deep calf veins and greater saphenous vein.     RIGHT: No deep vein thrombosis. No superficial thrombophlebitis. No popliteal cyst.    LEFT: No deep vein thrombosis. No superficial thrombophlebitis. Complex fluid collection medial to the left knee measures 8 mm in thickness and 6.2 cm in length.      Impression    IMPRESSION:    1.  Suboptimal assessment of calf veins due to technical limitations as described. No acute DVT in the imaged veins of the bilateral lower extremities.  2.  Mildly complex left popliteal fossa cyst.   Vancomycin level   Result Value Ref Range    Vancomycin 14.5   mg/L    Narrative    Traditional Dosing Therapeutic Range:  Trough 8-20 mg/L  Peak 20-50 mg/L    Critical:  Greater than 25.0 mg/L     Extra Tube ([place] Draw)    Narrative    The following orders were created for panel order Extra Tube ([place] Draw).  Procedure                               Abnormality         Status                     ---------                               -----------         ------                     Extra Purple Top Tube[[id number]]                            In process                   Please view results for these tests on the individual orders.   Extra Tube ([place] Draw)    Narrative    The following orders were created for panel order Extra Tube ([place] Draw).  Procedure                               Abnormality         Status                     ---------                               -----------         ------                     Extra Red Top Tube[[id number]]                               In process                   Please view results for these tests on the individual orders.

## 2021-07-23 NOTE — PROGRESS NOTES
[facility] Progress Note  I met with Elizabeth this afternoon. She shared with me that she is doing ok and feels ready to get her infection taken care of. With her MS she feels that getting out and being active is what she is called to do. She feels bad that she has not been able to do this now, but feels committed to doing whatever it takes to keep her leg healthy. She asked me to offer a prayer for her strength, which I did.       Howard Rodriguez  Associate

## 2021-07-23 NOTE — PLAN OF CARE
Problem: Adult Inpatient Plan of Care  Goal: Optimal Comfort and Wellbeing  Outcome: Improving     Problem: Surgical Site Infection  Goal: Improved Infection Symptoms  Outcome: Improving        IV abx given. Pt states 9/10 pain at bedtime gave PRN tramadol with relief. Pt up with SBA and cane. Dressing in place. Will continue to monitor.

## 2021-07-24 LAB
C REACTIVE PROTEIN LHE: 9 MG/DL (ref 0–0.8)
CREAT SERPL-MCNC: 0.69 MG/DL (ref 0.6–1.1)
GFR SERPL CREATININE-BSD FRML MDRD: 87 ML/MIN/1.73M2
HOLD SPECIMEN: NORMAL
PLATELET # BLD AUTO: 309 10E3/UL (ref 150–450)

## 2021-07-24 PROCEDURE — 85049 AUTOMATED PLATELET COUNT: CPT | Performed by: HOSPITALIST

## 2021-07-24 PROCEDURE — 258N000003 HC RX IP 258 OP 636: Performed by: HOSPITALIST

## 2021-07-24 PROCEDURE — 86141 C-REACTIVE PROTEIN HS: CPT | Performed by: INTERNAL MEDICINE

## 2021-07-24 PROCEDURE — 250N000013 HC RX MED GY IP 250 OP 250 PS 637: Performed by: INTERNAL MEDICINE

## 2021-07-24 PROCEDURE — 250N000011 HC RX IP 250 OP 636: Performed by: HOSPITALIST

## 2021-07-24 PROCEDURE — 250N000013 HC RX MED GY IP 250 OP 250 PS 637: Performed by: HOSPITALIST

## 2021-07-24 PROCEDURE — 120N000001 HC R&B MED SURG/OB

## 2021-07-24 PROCEDURE — 99232 SBSQ HOSP IP/OBS MODERATE 35: CPT | Performed by: INTERNAL MEDICINE

## 2021-07-24 PROCEDURE — 36415 COLL VENOUS BLD VENIPUNCTURE: CPT | Performed by: HOSPITALIST

## 2021-07-24 PROCEDURE — 82565 ASSAY OF CREATININE: CPT | Performed by: HOSPITALIST

## 2021-07-24 PROCEDURE — 99232 SBSQ HOSP IP/OBS MODERATE 35: CPT | Performed by: HOSPITALIST

## 2021-07-24 RX ORDER — ACETAMINOPHEN 325 MG/1
650 TABLET ORAL EVERY 4 HOURS PRN
Status: DISCONTINUED | OUTPATIENT
Start: 2021-07-24 | End: 2021-07-27 | Stop reason: HOSPADM

## 2021-07-24 RX ADMIN — OXYCODONE HYDROCHLORIDE 5 MG: 5 TABLET ORAL at 14:47

## 2021-07-24 RX ADMIN — PANTOPRAZOLE SODIUM 40 MG: 20 TABLET, DELAYED RELEASE ORAL at 09:14

## 2021-07-24 RX ADMIN — OXYCODONE HYDROCHLORIDE 5 MG: 5 TABLET ORAL at 18:45

## 2021-07-24 RX ADMIN — Medication 2000 UNITS: at 09:12

## 2021-07-24 RX ADMIN — ROPINIROLE HYDROCHLORIDE 1 MG: 1 TABLET, FILM COATED ORAL at 21:26

## 2021-07-24 RX ADMIN — ASPIRIN 81 MG CHEWABLE TABLET 81 MG: 81 TABLET CHEWABLE at 21:26

## 2021-07-24 RX ADMIN — ACETAMINOPHEN 650 MG: 325 TABLET ORAL at 22:08

## 2021-07-24 RX ADMIN — ROPINIROLE HYDROCHLORIDE 0.5 MG: 0.5 TABLET, FILM COATED ORAL at 09:13

## 2021-07-24 RX ADMIN — ENOXAPARIN SODIUM 40 MG: 100 INJECTION SUBCUTANEOUS at 00:53

## 2021-07-24 RX ADMIN — ENOXAPARIN SODIUM 40 MG: 100 INJECTION SUBCUTANEOUS at 12:23

## 2021-07-24 RX ADMIN — SPIRONOLACTONE 25 MG: 25 TABLET, FILM COATED ORAL at 09:14

## 2021-07-24 RX ADMIN — FUROSEMIDE 20 MG: 20 TABLET ORAL at 09:13

## 2021-07-24 RX ADMIN — ROPINIROLE HYDROCHLORIDE 0.5 MG: 0.5 TABLET, FILM COATED ORAL at 18:46

## 2021-07-24 RX ADMIN — PIPERACILLIN AND TAZOBACTAM 3.38 G: 3; .375 INJECTION, POWDER, LYOPHILIZED, FOR SOLUTION INTRAVENOUS at 14:47

## 2021-07-24 RX ADMIN — PIPERACILLIN AND TAZOBACTAM 3.38 G: 3; .375 INJECTION, POWDER, LYOPHILIZED, FOR SOLUTION INTRAVENOUS at 06:39

## 2021-07-24 RX ADMIN — BUPROPION HYDROCHLORIDE 150 MG: 150 TABLET, FILM COATED, EXTENDED RELEASE ORAL at 09:13

## 2021-07-24 RX ADMIN — FUROSEMIDE 20 MG: 20 TABLET ORAL at 21:26

## 2021-07-24 RX ADMIN — OXYCODONE HYDROCHLORIDE 5 MG: 5 TABLET ORAL at 06:52

## 2021-07-24 RX ADMIN — CITALOPRAM HYDROBROMIDE 40 MG: 20 TABLET ORAL at 09:13

## 2021-07-24 RX ADMIN — PIPERACILLIN AND TAZOBACTAM 3.38 G: 3; .375 INJECTION, POWDER, LYOPHILIZED, FOR SOLUTION INTRAVENOUS at 21:26

## 2021-07-24 RX ADMIN — SIMVASTATIN 40 MG: 40 TABLET, FILM COATED ORAL at 21:26

## 2021-07-24 RX ADMIN — VANCOMYCIN HYDROCHLORIDE 1500 MG: 5 INJECTION, POWDER, LYOPHILIZED, FOR SOLUTION INTRAVENOUS at 21:26

## 2021-07-24 NOTE — PLAN OF CARE
Problem: Adult Inpatient Plan of Care  Goal: Absence of Hospital-Acquired Illness or Injury  Intervention: Prevent Skin Injury  Recent Flowsheet Documentation  Taken 7/24/2021 0100 by Nataly Jackson RN  Body Position:   position changed independently   lower extremity elevated  Taken 7/23/2021 2100 by Nataly Jackson RN  Body Position:   position changed independently   lower extremity elevated     Problem: Adult Inpatient Plan of Care  Goal: Absence of Hospital-Acquired Illness or Injury  Intervention: Identify and Manage Fall Risk  Recent Flowsheet Documentation  Taken 7/24/2021 0100 by Nataly Jackson RN  Safety Promotion/Fall Prevention:   assistive device/personal items within reach   lighting adjusted  Taken 7/23/2021 2100 by Nataly Jackson RN  Safety Promotion/Fall Prevention:   assistive device/personal items within reach   lighting adjusted     Pt uses call light appropriately. Uses cane for ambulation. VSS. Pain controlled with PRN medications. Lower extremity elevated. Dressing change done as needed for saturation.

## 2021-07-24 NOTE — PLAN OF CARE
Problem: Adult Inpatient Plan of Care  Goal: Plan of Care Review  Outcome: Improving  Goal: Patient-Specific Goal (Individualized)  Outcome: Improving  Goal: Absence of Hospital-Acquired Illness or Injury  Outcome: Improving  Intervention: Prevent Skin Injury  Recent Flowsheet Documentation  Taken 7/23/2021 1556 by Jeannette John RN  Body Position:   lower extremity elevated   position changed independently  Taken 7/23/2021 0835 by Jeannette John RN  Body Position:   lower extremity elevated   position changed independently  Intervention: Prevent and Manage VTE (Venous Thromboembolism) Risk  Recent Flowsheet Documentation  Taken 7/23/2021 1556 by Jeannette John RN  VTE Prevention/Management: anticoagulant therapy maintained  Taken 7/23/2021 0835 by Jeannette John RN  VTE Prevention/Management: anticoagulant therapy initiated  Goal: Optimal Comfort and Wellbeing  Outcome: Improving  Goal: Readiness for Transition of Care  Outcome: Improving     Problem: Risk for Delirium  Goal: Optimal Coping  Outcome: Improving  Goal: Improved Behavioral Control  Outcome: Improving  Goal: Improved Attention and Thought Clarity  Outcome: Improving  Goal: Improved Sleep  Outcome: Improving     Problem: Surgical Site Infection  Goal: Improved Infection Symptoms  Outcome: Improving  Intervention: Prevent and Manage Infection  Recent Flowsheet Documentation  Taken 7/23/2021 1556 by Jeannette John RN  Isolation Precautions: contact precautions maintained  Taken 7/23/2021 0835 by Jeannette John RN  Isolation Precautions: (MRSA) contact precautions maintained

## 2021-07-24 NOTE — PROGRESS NOTES
[facility] MEDICINE PROGRESS NOTE      Identification/Summary: Elizabeth Kelley is a 75 year old female with a past medical history of recurrent cellulitis, leg ulcers, obesity, peptic ulcer disease, GERD, MS who was admitted on 7/21/2021 for right lower extremity cellulitis. Hospital course is notable for initiation of Zosyn and vancomycin. ID recommends continuing IV antibx for at least another day.     Assessment and Plan:  Right lower extremity wound/cellulitis status post debridement 7/5/2021  Wound cultures from 7/5/2021: MRSA,  Pseudomonas resistant to Cipro, Enterococcus.  Patient completed 7-day course of clindamycin from 7/8/2021 to 7/15/2021, return with worsening redness and pain in the leg  Zosyn/vancomycin is recommended to continue, ID following.  Ultrasound -ve for DVT and this was negative.  Wound ostomy consult appreciated   Infectious disease consult for recommendation on antibiotics - appreciated     Hyperkalemia  Takes supplements, now resolved  On Aldactone and furosemide    Mood disorder/depression  Wellbutrin  mg daily  Celexa 40 mg daily    Hyperlipidemia  Statin    History of peptic ulcer disease/GERD  PPI    Restless leg  Ropinirole    Diet: Regular Diet Adult  DVT Prophylaxis:  Enoxaparin (Lovenox) SQ  Code Status: Full Code    Anticipated possible discharge once cleared by ID and discharge antibiotics/plan established    Interval History/Subjective:  NAEO.     No new complaints. Answered all questions she had.     Physical Exam/Objective:  Temp:  [98  F (36.7  C)-98.4  F (36.9  C)] 98.4  F (36.9  C)  Pulse:  [75-77] 77  Resp:  [16-17] 17  BP: (125-128)/(60-66) 128/66  SpO2:  [97 %-98 %] 97 %    Body mass index is 47.43 kg/m .  GENERAL:  Alert, appears comfortable, in no acute distress, appears stated age   HEAD:  Normocephalic, without obvious abnormality, atraumatic   EYES:  PERRL, conjunctiva/corneas clear, no scleral icterus, EOM's intact   NOSE: Nares  normal, septum midline, mucosa normal, no drainage   THROAT: Lips, mucosa, and tongue normal; teeth and gums normal, mouth moist   NECK: Supple, symmetrical, trachea midline   BACK:   Symmetric, no curvature, ROM normal   LUNGS:   Clear to auscultation bilaterally, no rales, rhonchi, or wheezing, symmetric chest rise on inhalation, respirations unlabored   CHEST WALL:  No tenderness or deformity   HEART:  Regular rate and rhythm, S1 and S2 normal, no murmur, rub, or gallop    ABDOMEN:   Soft, non-tender, bowel sounds active all four quadrants, no masses, no organomegaly, no rebound or guarding   EXTREMITIES: Extremities normal, atraumatic, no cyanosis or edema    SKIN: RLE dressing,   Redness, ulceration    Redness at edges receding, overall better endorsed by patient; other skin is dry to touch, no exanthems in the visualized areas   NEURO: Alert, oriented x 4, moves all four extremities freely, non-focal   PSYCH: Cooperative, behavior is appropriate      Medications:   Personally Reviewed.  Medications       aspirin  81 mg Oral QPM     buPROPion  150 mg Oral QAM     citalopram  40 mg Oral QAM     enoxaparin ANTICOAGULANT  40 mg Subcutaneous Q12H     furosemide  20 mg Oral BID     pantoprazole  40 mg Oral Daily     piperacillin-tazobactam  3.375 g Intravenous Q8H     rOPINIRole  0.5 mg Oral BID     rOPINIRole  1 mg Oral At Bedtime     simvastatin  40 mg Oral At Bedtime     sodium chloride (PF)  3 mL Intracatheter Q8H     spironolactone  25 mg Oral Daily     vancomycin  1,500 mg Intravenous Q24H     Vitamin D3  2,000 Units Oral Daily       Data reviewed today: I personally reviewed all new medications, labs, imaging/diagnostics reports over the past 24 hours. Pertinent findings include:    Imaging:   No results found for this or any previous visit (from the past 24 hour(s)).    Labs:  Most Recent 3 CBC's:  Recent Labs   Lab Test 07/24/21  0818 07/22/21  0757 07/21/21  1855 09/14/19  0046   WBC  --  10.0 9.0 8.4    HGB  --  9.6* 11.8 12.6   MCV  --  85 85 91    322 397 130*     Most Recent 3 BMP's:  Recent Labs   Lab Test 07/24/21  0818 07/22/21  0757 07/21/21  1855 09/26/20  1017   NA  --  140 141 141   POTASSIUM  --  4.0 5.2* 3.9   CHLORIDE  --  107 103 103   CO2  --  26 26 30   BUN  --  17 23 24   CR 0.69 0.74 0.82 0.79   ANIONGAP  --  7 12 8   JUNI  --  8.5 9.7 9.4   GLC  --  99 91 92     Most Recent 2 LFT's:  Recent Labs   Lab Test 09/26/20  1017 05/02/19  0944   AST 16 22   ALT 22 26   ALKPHOS 83 88   BILITOTAL 0.4 0.4       Raji Zacarias MD  Internal Medicine  [facility]  Phone: #[phone]

## 2021-07-24 NOTE — PROGRESS NOTES
[facility]  Infectious Disease   Progress Note     Date of Admission:  7/21/2021    Assessment & Plan   Cellulitis right lower extremity, improving.   Weeping ulcers  Lymphedema  Recent culture with MRSA Pseudomonas Enterococcus and Proteus     PLAN   Continue IV pip-tazo and IV vancomycin for now  and then hopefully oral antibiotics-note there is no PO option to cover above organisms (although with wound swab, unclear significance)  Wound care  Leg elevation  CRP-pending    Kelley Snyder M.D.    Patient new to me, 7/24. Please see prior notes by Dr Parham.     ____________________________________________________    Interval History   Less pain, overall leg is feeling better. Appetite picking up.     Physical Exam   Vital Signs: Temp: 98.4  F (36.9  C) Temp src: Oral BP: 128/66 Pulse: 77   Resp: 17 SpO2: 97 % O2 Device: None (Room air)    Weight: 251 lbs .1 oz  Gen. appearance nontoxic  Eyes no conjunctivitis or icterus  Neck no stiffness  Heart  bilat edema  Lungs no respiratory distress  Abdomen not distended  Extremities bilat lymphedema...circunfrential ulcerations with greenish Discharge redness tenderness. Seems like less swelling today  Skin  no rash or emboli  Neurologic alert oriented no focal deficits    Results for LELO ALVAREZ (MRN [id number]) as of 7/23/2021 12:23   Ref. Range 7/22/2021 07:57   WBC Latest Ref Range: 4.0 - 11.0 10e3/uL 10.0   Hemoglobin Latest Ref Range: 11.7 - 15.7 g/dL 9.6 (L)   Hematocrit Latest Ref Range: 35.0 - 47.0 % 31.5 (L)   Platelet Count Latest Ref Range: 150 - 450 10e3/uL 322   RBC Count Latest Ref Range: 3.80 - 5.20 10e6/uL 3.71 (L)   MCV Latest Ref Range: 78 - 100 fL 85   MCH Latest Ref Range: 26.5 - 33.0 pg 25.9 (L)   MCHC Latest Ref Range: 31.5 - 36.5 g/dL 30.5 (L)   RDW Latest Ref Range: 10.0 - 15.0 % 14.6     Pseudomonas aeruginosa MRSA Coagulase Positive Staph       MARYBEL MARYBEL     Aztreonam 16  Intermediate       Cefazolin   >16  Resistant      Cefepime 8  Susceptible       Ceftazidime 4  Susceptible       Ciprofloxacin 2  Resistant       Clindamycin   <=0.5  Susceptible     Daptomycin   <=1  Susceptible     Doxycycline   2  Susceptible     Gentamicin >8  Resistant       Levofloxacin 4  Resistant       Linezolid   2  Susceptible     Meropenem 2  Susceptible       Oxacillin   >2  Resistant     Piperacillin/Tazobactam 16/4  Susceptible       Tobramycin <=2  Susceptible       Trimeth/Sulfa   <=1/19  Susceptible     Vancomycin   1  Susceptible             Susceptibility     Enterococcus faecalis     MARYBEL     Ampicillin 1  Susceptible        Data   Results for orders placed or performed during the hospital encounter of 07/21/21 (from the past 24 hour(s))   Platelet count   Result Value Ref Range    Platelet Count 309 150 - 450 10e3/uL

## 2021-07-24 NOTE — PLAN OF CARE
Problem: Adult Inpatient Plan of Care  Goal: Plan of Care Review  Outcome: Improving  Goal: Patient-Specific Goal (Individualized)  Outcome: Improving  Goal: Absence of Hospital-Acquired Illness or Injury  Outcome: Improving  Intervention: Identify and Manage Fall Risk  Recent Flowsheet Documentation  Taken 7/24/2021 0900 by Jeannette John RN  Safety Promotion/Fall Prevention:   assistive device/personal items within reach   clutter free environment maintained   lighting adjusted   mobility aid in reach  Intervention: Prevent Skin Injury  Recent Flowsheet Documentation  Taken 7/24/2021 1640 by Jeannette John RN  Body Position: position changed independently  Taken 7/24/2021 0900 by Jeannette John RN  Body Position: position changed independently  Intervention: Prevent and Manage VTE (Venous Thromboembolism) Risk  Recent Flowsheet Documentation  Taken 7/24/2021 0900 by Jeannette John RN  VTE Prevention/Management:   ambulation promoted   anticoagulant therapy maintained  Goal: Optimal Comfort and Wellbeing  Outcome: Improving  Goal: Readiness for Transition of Care  Outcome: Improving     Problem: Risk for Delirium  Goal: Optimal Coping  Outcome: Improving  Goal: Improved Behavioral Control  Outcome: Improving  Goal: Improved Attention and Thought Clarity  Outcome: Improving  Goal: Improved Sleep  Outcome: Improving     Problem: Surgical Site Infection  Goal: Improved Infection Symptoms  Outcome: Improving  Intervention: Prevent and Manage Infection  Recent Flowsheet Documentation  Taken 7/24/2021 1640 by Jeannette John RN  Isolation Precautions: contact precautions maintained  Taken 7/24/2021 0900 by Jeannette John RN  Isolation Precautions: contact precautions maintained

## 2021-07-25 PROCEDURE — 250N000013 HC RX MED GY IP 250 OP 250 PS 637: Performed by: INTERNAL MEDICINE

## 2021-07-25 PROCEDURE — 258N000003 HC RX IP 258 OP 636: Performed by: HOSPITALIST

## 2021-07-25 PROCEDURE — 250N000013 HC RX MED GY IP 250 OP 250 PS 637: Performed by: HOSPITALIST

## 2021-07-25 PROCEDURE — 250N000011 HC RX IP 250 OP 636: Performed by: HOSPITALIST

## 2021-07-25 PROCEDURE — 99232 SBSQ HOSP IP/OBS MODERATE 35: CPT | Performed by: HOSPITALIST

## 2021-07-25 PROCEDURE — 99232 SBSQ HOSP IP/OBS MODERATE 35: CPT | Performed by: INTERNAL MEDICINE

## 2021-07-25 PROCEDURE — 120N000001 HC R&B MED SURG/OB

## 2021-07-25 RX ADMIN — OXYCODONE HYDROCHLORIDE 5 MG: 5 TABLET ORAL at 22:22

## 2021-07-25 RX ADMIN — Medication 2000 UNITS: at 09:16

## 2021-07-25 RX ADMIN — BUPROPION HYDROCHLORIDE 150 MG: 150 TABLET, FILM COATED, EXTENDED RELEASE ORAL at 09:15

## 2021-07-25 RX ADMIN — ROPINIROLE HYDROCHLORIDE 0.5 MG: 0.5 TABLET, FILM COATED ORAL at 16:23

## 2021-07-25 RX ADMIN — OXYCODONE HYDROCHLORIDE 5 MG: 5 TABLET ORAL at 06:50

## 2021-07-25 RX ADMIN — PIPERACILLIN AND TAZOBACTAM 3.38 G: 3; .375 INJECTION, POWDER, LYOPHILIZED, FOR SOLUTION INTRAVENOUS at 06:17

## 2021-07-25 RX ADMIN — PANTOPRAZOLE SODIUM 40 MG: 20 TABLET, DELAYED RELEASE ORAL at 07:55

## 2021-07-25 RX ADMIN — ENOXAPARIN SODIUM 40 MG: 100 INJECTION SUBCUTANEOUS at 00:00

## 2021-07-25 RX ADMIN — FUROSEMIDE 20 MG: 20 TABLET ORAL at 22:22

## 2021-07-25 RX ADMIN — VANCOMYCIN HYDROCHLORIDE 1500 MG: 5 INJECTION, POWDER, LYOPHILIZED, FOR SOLUTION INTRAVENOUS at 22:23

## 2021-07-25 RX ADMIN — ENOXAPARIN SODIUM 40 MG: 100 INJECTION SUBCUTANEOUS at 11:39

## 2021-07-25 RX ADMIN — OXYCODONE HYDROCHLORIDE 5 MG: 5 TABLET ORAL at 18:09

## 2021-07-25 RX ADMIN — FUROSEMIDE 20 MG: 20 TABLET ORAL at 09:17

## 2021-07-25 RX ADMIN — ROPINIROLE HYDROCHLORIDE 1 MG: 1 TABLET, FILM COATED ORAL at 22:22

## 2021-07-25 RX ADMIN — OXYCODONE HYDROCHLORIDE 5 MG: 5 TABLET ORAL at 00:00

## 2021-07-25 RX ADMIN — ACETAMINOPHEN 650 MG: 325 TABLET ORAL at 14:17

## 2021-07-25 RX ADMIN — PIPERACILLIN AND TAZOBACTAM 3.38 G: 3; .375 INJECTION, POWDER, LYOPHILIZED, FOR SOLUTION INTRAVENOUS at 14:18

## 2021-07-25 RX ADMIN — PIPERACILLIN AND TAZOBACTAM 3.38 G: 3; .375 INJECTION, POWDER, LYOPHILIZED, FOR SOLUTION INTRAVENOUS at 22:23

## 2021-07-25 RX ADMIN — OXYCODONE HYDROCHLORIDE 5 MG: 5 TABLET ORAL at 13:00

## 2021-07-25 RX ADMIN — ASPIRIN 81 MG CHEWABLE TABLET 81 MG: 81 TABLET CHEWABLE at 22:22

## 2021-07-25 RX ADMIN — SPIRONOLACTONE 25 MG: 25 TABLET, FILM COATED ORAL at 09:16

## 2021-07-25 RX ADMIN — SIMVASTATIN 40 MG: 40 TABLET, FILM COATED ORAL at 22:22

## 2021-07-25 RX ADMIN — ACETAMINOPHEN 650 MG: 325 TABLET ORAL at 20:58

## 2021-07-25 RX ADMIN — CITALOPRAM HYDROBROMIDE 40 MG: 20 TABLET ORAL at 09:15

## 2021-07-25 RX ADMIN — ROPINIROLE HYDROCHLORIDE 0.5 MG: 0.5 TABLET, FILM COATED ORAL at 09:16

## 2021-07-25 NOTE — PLAN OF CARE
Problem: Adult Inpatient Plan of Care  Goal: Plan of Care Review  Outcome: Improving  Goal: Patient-Specific Goal (Individualized)  Outcome: Improving  Goal: Absence of Hospital-Acquired Illness or Injury  Outcome: Improving  Intervention: Identify and Manage Fall Risk  Recent Flowsheet Documentation  Taken 7/25/2021 1627 by Jeannette John RN  Safety Promotion/Fall Prevention:   assistive device/personal items within reach   mobility aid in reach   patient and family education  Taken 7/25/2021 0915 by Jeannette John RN  Safety Promotion/Fall Prevention:   assistive device/personal items within reach   mobility aid in reach   lighting adjusted   safety round/check completed   clutter free environment maintained  Intervention: Prevent Skin Injury  Recent Flowsheet Documentation  Taken 7/25/2021 1627 by Jeannette John RN  Body Position: position changed independently  Taken 7/25/2021 0915 by Jeannette John RN  Body Position: position changed independently  Goal: Optimal Comfort and Wellbeing  Outcome: Improving  Goal: Readiness for Transition of Care  Outcome: Improving     Problem: Risk for Delirium  Goal: Optimal Coping  Outcome: Improving  Goal: Improved Behavioral Control  Outcome: Improving  Goal: Improved Attention and Thought Clarity  Outcome: Improving  Goal: Improved Sleep  Outcome: Improving     Problem: Surgical Site Infection  Goal: Improved Infection Symptoms  Outcome: Improving     Problem: Infection  Goal: Absence of Infection Signs and Symptoms  Outcome: Improving     Problem: Pain Acute  Goal: Acceptable Pain Control and Functional Ability  Outcome: Improving  Intervention: Develop Pain Management Plan  Recent Flowsheet Documentation  Taken 7/25/2021 1627 by Jeannette John RN  Pain Management Interventions:   medication (see MAR)   care clustered   essential oils

## 2021-07-25 NOTE — PROGRESS NOTES
[facility]  Infectious Disease   Progress Note     Date of Admission:  7/21/2021    Assessment & Plan   Cellulitis right lower extremity, improving.   Weeping ulcers  Lymphedema  Recent culture with MRSA Pseudomonas Enterococcus and Proteus     PLAN   Continue IV pip-tazo and IV vancomycin for now   and then hopefully oral antibiotics-note there is no PO option to cover above organisms (although with wound swab, unclear significance)  PO option for discharge could be doxycycline and augmentin through 8/3  Wound care  Leg elevation  Trend CRP    Kelley Snyder M.D.    Patient new to me, 7/24. Please see prior notes by Dr Parham.     ____________________________________________________    Interval History   Pain improving, thinks less red than before. Appetite ok. Worried about discharging and wants to stay for more IV abx.     Physical Exam   Vital Signs: Temp: 97.9  F (36.6  C) Temp src: Oral BP: 120/60 Pulse: 68   Resp: 17 SpO2: 93 % O2 Device: None (Room air)    Weight: 251 lbs .1 oz  Gen. appearance nontoxic  Eyes no conjunctivitis or icterus  Neck no stiffness  Heart  bilat edema  Lungs no respiratory distress  Abdomen not distended  Extremities bilat lymphedema...circunfrential ulcerations with greenish Discharge redness tenderness. Seems like less swelling today, less erythema  Skin  no rash or emboli  Neurologic alert oriented no focal deficits    Results for LELO ALVAREZ JODY (MRN [id number]) as of 7/23/2021 12:23   Ref. Range 7/22/2021 07:57   WBC Latest Ref Range: 4.0 - 11.0 10e3/uL 10.0   Hemoglobin Latest Ref Range: 11.7 - 15.7 g/dL 9.6 (L)   Hematocrit Latest Ref Range: 35.0 - 47.0 % 31.5 (L)   Platelet Count Latest Ref Range: 150 - 450 10e3/uL 322   RBC Count Latest Ref Range: 3.80 - 5.20 10e6/uL 3.71 (L)   MCV Latest Ref Range: 78 - 100 fL 85   MCH Latest Ref Range: 26.5 - 33.0 pg 25.9 (L)   MCHC Latest Ref Range: 31.5 - 36.5 g/dL 30.5 (L)   RDW Latest Ref Range: 10.0 - 15.0 %  14.6     Pseudomonas aeruginosa MRSA Coagulase Positive Staph       MARYBEL MARYBEL     Aztreonam 16  Intermediate       Cefazolin   >16  Resistant     Cefepime 8  Susceptible       Ceftazidime 4  Susceptible       Ciprofloxacin 2  Resistant       Clindamycin   <=0.5  Susceptible     Daptomycin   <=1  Susceptible     Doxycycline   2  Susceptible     Gentamicin >8  Resistant       Levofloxacin 4  Resistant       Linezolid   2  Susceptible     Meropenem 2  Susceptible       Oxacillin   >2  Resistant     Piperacillin/Tazobactam 16/4  Susceptible       Tobramycin <=2  Susceptible       Trimeth/Sulfa   <=1/19  Susceptible     Vancomycin   1  Susceptible             Susceptibility     Enterococcus faecalis     MARYBEL     Ampicillin 1  Susceptible        Data   No results found for this or any previous visit (from the past 24 hour(s)).   no

## 2021-07-25 NOTE — PLAN OF CARE
Problem: Adult Inpatient Plan of Care  Goal: Absence of Hospital-Acquired Illness or Injury  Intervention: Prevent Skin Injury  Recent Flowsheet Documentation  Taken 7/25/2021 0000 by Nataly Jackson RN  Body Position: position changed independently     Problem: Risk for Delirium  Goal: Improved Sleep  Outcome: Improving     Problem: Adult Inpatient Plan of Care  Goal: Optimal Comfort and Wellbeing  Outcome: Improving    Pt slept well throughout the night with minimal interruptions. IV abx given as scheduled. PRN medications given to help with pain control. Uses call light appropriately, up to bathroom with SBA and cane.

## 2021-07-25 NOTE — PROGRESS NOTES
[facility] MEDICINE PROGRESS NOTE      Identification/Summary: Elizabeth Kelley is a 75 year old female with a past medical history of recurrent cellulitis, leg ulcers, obesity, peptic ulcer disease, GERD, MS who was admitted on 7/21/2021 for right lower extremity cellulitis. Hospital course is notable for initiation of Zosyn and vancomycin. ID recommends continuing IV antibx for another day. Anticipate discharge tomorrow on oral antibiotics if okay with ID.     Assessment and Plan:  Right lower extremity wound/cellulitis status post debridement 7/5/2021  Wound cultures from 7/5/2021: MRSA,  Pseudomonas resistant to Cipro, Enterococcus.  Patient completed 7-day course of clindamycin from 7/8/2021 to 7/15/2021, return with worsening redness and pain in the leg  Zosyn/vancomycin is recommended to continue, ID following.  Ultrasound -ve for DVT and this was negative.  Wound ostomy consult appreciated   Infectious disease consult for recommendation on antibiotics - appreciated   ID recommends continuing IV antibx for another day.    Hyperkalemia  Takes supplements, now resolved  On Aldactone and furosemide    Mood disorder/depression  Wellbutrin  mg daily  Celexa 40 mg daily    Hyperlipidemia  Statin    History of peptic ulcer disease/GERD  PPI    Restless leg  Ropinirole    Diet: Regular Diet Adult  DVT Prophylaxis:  Enoxaparin (Lovenox) SQ  Code Status: Full Code    Anticipated possible discharge tomorrow on oral antibiotics if okay with ID.     Interval History/Subjective:  NAEO.     No new complaints. Answered all questions she had to verbalized and stated understanding and satisfaction.     Physical Exam/Objective:  Temp:  [98.3  F (36.8  C)-98.4  F (36.9  C)] 98.3  F (36.8  C)  Pulse:  [74-77] 74  Resp:  [16] 16  BP: (134-147)/(61-66) 134/61  SpO2:  [94 %-95 %] 95 %    Body mass index is 47.43 kg/m .  GENERAL:  Alert, appears comfortable, in no acute distress, appears stated age    HEAD:  Normocephalic, without obvious abnormality, atraumatic   EYES:  PERRL, conjunctiva/corneas clear, no scleral icterus, EOM's intact   NOSE: Nares normal, septum midline, mucosa normal, no drainage   THROAT: Lips, mucosa, and tongue normal; teeth and gums normal, mouth moist   NECK: Supple, symmetrical, trachea midline   BACK:   Symmetric, no curvature, ROM normal   LUNGS:   Clear to auscultation bilaterally, no rales, rhonchi, or wheezing, symmetric chest rise on inhalation, respirations unlabored   CHEST WALL:  No tenderness or deformity   HEART:  Regular rate and rhythm, S1 and S2 normal, no murmur, rub, or gallop    ABDOMEN:   Soft, non-tender, bowel sounds active all four quadrants, no masses, no organomegaly, no rebound or guarding   EXTREMITIES: Extremities normal, atraumatic, no cyanosis or edema    SKIN: RLE dressing,   Redness, ulceration    Redness at edges receding further, still endorsed to be improved further by patient; other skin is dry to touch, no exanthems in the visualized areas   NEURO: Alert, oriented x 4, moves all four extremities freely, non-focal   PSYCH: Cooperative, behavior is appropriate      Medications:   Personally Reviewed.  Medications       aspirin  81 mg Oral QPM     buPROPion  150 mg Oral QAM     citalopram  40 mg Oral QAM     enoxaparin ANTICOAGULANT  40 mg Subcutaneous Q12H     furosemide  20 mg Oral BID     pantoprazole  40 mg Oral Daily     piperacillin-tazobactam  3.375 g Intravenous Q8H     rOPINIRole  0.5 mg Oral BID     rOPINIRole  1 mg Oral At Bedtime     simvastatin  40 mg Oral At Bedtime     sodium chloride (PF)  3 mL Intracatheter Q8H     spironolactone  25 mg Oral Daily     vancomycin  1,500 mg Intravenous Q24H     Vitamin D3  2,000 Units Oral Daily       Data reviewed today: I personally reviewed all new medications, labs, imaging/diagnostics reports over the past 24 hours. Pertinent findings include:    Imaging:   No results found for this or any previous  visit (from the past 24 hour(s)).    Labs:  Most Recent 3 CBC's:  Recent Labs   Lab Test 07/24/21  0818 07/22/21 0757 07/21/21 1855 09/14/19  0046   WBC  --  10.0 9.0 8.4   HGB  --  9.6* 11.8 12.6   MCV  --  85 85 91    322 397 130*     Most Recent 3 BMP's:  Recent Labs   Lab Test 07/24/21 0818 07/22/21 0757 07/21/21 1855 09/26/20  1017   NA  --  140 141 141   POTASSIUM  --  4.0 5.2* 3.9   CHLORIDE  --  107 103 103   CO2  --  26 26 30   BUN  --  17 23 24   CR 0.69 0.74 0.82 0.79   ANIONGAP  --  7 12 8   JUNI  --  8.5 9.7 9.4   GLC  --  99 91 92     Most Recent 2 LFT's:  Recent Labs   Lab Test 09/26/20  1017 05/02/19  0944   AST 16 22   ALT 22 26   ALKPHOS 83 88   BILITOTAL 0.4 0.4       Raji Zacarias MD  Internal Medicine  [facility]  [facility]  Phone: #[phone]

## 2021-07-26 LAB
BACTERIA BLD CULT: NO GROWTH
BACTERIA BLD CULT: NO GROWTH
C REACTIVE PROTEIN LHE: 4.2 MG/DL (ref 0–0.8)
CREAT SERPL-MCNC: 0.72 MG/DL (ref 0.6–1.1)
GFR SERPL CREATININE-BSD FRML MDRD: 83 ML/MIN/1.73M2
HOLD SPECIMEN: NORMAL
HOLD SPECIMEN: NORMAL

## 2021-07-26 PROCEDURE — 86141 C-REACTIVE PROTEIN HS: CPT | Performed by: INTERNAL MEDICINE

## 2021-07-26 PROCEDURE — 250N000013 HC RX MED GY IP 250 OP 250 PS 637: Performed by: INTERNAL MEDICINE

## 2021-07-26 PROCEDURE — 258N000003 HC RX IP 258 OP 636: Performed by: HOSPITALIST

## 2021-07-26 PROCEDURE — 99232 SBSQ HOSP IP/OBS MODERATE 35: CPT | Performed by: INTERNAL MEDICINE

## 2021-07-26 PROCEDURE — 36415 COLL VENOUS BLD VENIPUNCTURE: CPT | Performed by: INTERNAL MEDICINE

## 2021-07-26 PROCEDURE — 250N000009 HC RX 250: Performed by: HOSPITALIST

## 2021-07-26 PROCEDURE — 99232 SBSQ HOSP IP/OBS MODERATE 35: CPT | Performed by: HOSPITALIST

## 2021-07-26 PROCEDURE — 250N000011 HC RX IP 250 OP 636: Performed by: HOSPITALIST

## 2021-07-26 PROCEDURE — G0463 HOSPITAL OUTPT CLINIC VISIT: HCPCS | Mod: 25

## 2021-07-26 PROCEDURE — 97602 WOUND(S) CARE NON-SELECTIVE: CPT

## 2021-07-26 PROCEDURE — 82565 ASSAY OF CREATININE: CPT | Performed by: INTERNAL MEDICINE

## 2021-07-26 PROCEDURE — 120N000001 HC R&B MED SURG/OB

## 2021-07-26 PROCEDURE — 250N000013 HC RX MED GY IP 250 OP 250 PS 637: Performed by: HOSPITALIST

## 2021-07-26 RX ORDER — LIDOCAINE 50 MG/G
OINTMENT TOPICAL EVERY 4 HOURS PRN
Status: DISCONTINUED | OUTPATIENT
Start: 2021-07-26 | End: 2021-07-27 | Stop reason: HOSPADM

## 2021-07-26 RX ADMIN — ACETAMINOPHEN 650 MG: 325 TABLET ORAL at 17:37

## 2021-07-26 RX ADMIN — CITALOPRAM HYDROBROMIDE 40 MG: 20 TABLET ORAL at 08:13

## 2021-07-26 RX ADMIN — LIDOCAINE: 50 OINTMENT TOPICAL at 21:59

## 2021-07-26 RX ADMIN — ROPINIROLE HYDROCHLORIDE 1 MG: 1 TABLET, FILM COATED ORAL at 20:53

## 2021-07-26 RX ADMIN — PIPERACILLIN AND TAZOBACTAM 3.38 G: 3; .375 INJECTION, POWDER, LYOPHILIZED, FOR SOLUTION INTRAVENOUS at 08:10

## 2021-07-26 RX ADMIN — OXYCODONE HYDROCHLORIDE 5 MG: 5 TABLET ORAL at 13:33

## 2021-07-26 RX ADMIN — ASPIRIN 81 MG CHEWABLE TABLET 81 MG: 81 TABLET CHEWABLE at 20:53

## 2021-07-26 RX ADMIN — ENOXAPARIN SODIUM 40 MG: 100 INJECTION SUBCUTANEOUS at 01:00

## 2021-07-26 RX ADMIN — FUROSEMIDE 20 MG: 20 TABLET ORAL at 20:54

## 2021-07-26 RX ADMIN — PIPERACILLIN AND TAZOBACTAM 3.38 G: 3; .375 INJECTION, POWDER, LYOPHILIZED, FOR SOLUTION INTRAVENOUS at 16:11

## 2021-07-26 RX ADMIN — ACETAMINOPHEN 650 MG: 325 TABLET ORAL at 12:58

## 2021-07-26 RX ADMIN — SPIRONOLACTONE 25 MG: 25 TABLET, FILM COATED ORAL at 08:14

## 2021-07-26 RX ADMIN — ACETAMINOPHEN 650 MG: 325 TABLET ORAL at 01:00

## 2021-07-26 RX ADMIN — Medication 2000 UNITS: at 08:14

## 2021-07-26 RX ADMIN — ROPINIROLE HYDROCHLORIDE 0.5 MG: 0.5 TABLET, FILM COATED ORAL at 16:11

## 2021-07-26 RX ADMIN — VANCOMYCIN HYDROCHLORIDE 1500 MG: 5 INJECTION, POWDER, LYOPHILIZED, FOR SOLUTION INTRAVENOUS at 22:15

## 2021-07-26 RX ADMIN — ENOXAPARIN SODIUM 40 MG: 100 INJECTION SUBCUTANEOUS at 11:51

## 2021-07-26 RX ADMIN — OXYCODONE HYDROCHLORIDE 5 MG: 5 TABLET ORAL at 17:37

## 2021-07-26 RX ADMIN — OXYCODONE HYDROCHLORIDE 5 MG: 5 TABLET ORAL at 21:58

## 2021-07-26 RX ADMIN — OXYCODONE HYDROCHLORIDE 5 MG: 5 TABLET ORAL at 09:27

## 2021-07-26 RX ADMIN — PANTOPRAZOLE SODIUM 40 MG: 20 TABLET, DELAYED RELEASE ORAL at 06:22

## 2021-07-26 RX ADMIN — ACETAMINOPHEN 650 MG: 325 TABLET ORAL at 21:58

## 2021-07-26 RX ADMIN — ACETAMINOPHEN 650 MG: 325 TABLET ORAL at 06:22

## 2021-07-26 RX ADMIN — ROPINIROLE HYDROCHLORIDE 0.5 MG: 0.5 TABLET, FILM COATED ORAL at 08:13

## 2021-07-26 RX ADMIN — BUPROPION HYDROCHLORIDE 150 MG: 150 TABLET, FILM COATED, EXTENDED RELEASE ORAL at 08:13

## 2021-07-26 RX ADMIN — SIMVASTATIN 40 MG: 40 TABLET, FILM COATED ORAL at 20:54

## 2021-07-26 RX ADMIN — FUROSEMIDE 20 MG: 20 TABLET ORAL at 08:14

## 2021-07-26 NOTE — PLAN OF CARE
Problem: Adult Inpatient Plan of Care  Goal: Absence of Hospital-Acquired Illness or Injury  Intervention: Identify and Manage Fall Risk  Recent Flowsheet Documentation  Taken 7/26/2021 0100 by Nataly Jackson RN  Safety Promotion/Fall Prevention:   assistive device/personal items within reach   lighting adjusted   nonskid shoes/slippers when out of bed     Problem: Pain Acute  Goal: Acceptable Pain Control and Functional Ability  Outcome: Improving  Intervention: Develop Pain Management Plan  Recent Flowsheet Documentation  Taken 7/26/2021 0100 by Nataly Jackson, RN  Pain Management Interventions:   medication (see MAR)   rest  Taken 7/25/2021 2058 by Nataly Jackson RN  Pain Management Interventions:   medication (see MAR)   emotional support     Pt complaining of pain to right lower leg. PRN medications given. Able to rest comfortably throughout the night. Uses call light appropriately.

## 2021-07-26 NOTE — PROGRESS NOTES
Wound Ostomy  WOC Assessment       Allergies:  Other Environmental Allergy  Adhesive [Mecrylate]  Vicodin [Hydrocodone-Acetaminophen]    Diagnosis:   Patient Active Problem List    Diagnosis Date Noted     Cellulitis of right lower extremity 07/21/2021     Priority: Medium     History of malignant melanoma of skin 07/05/2021     Priority: Medium     Edema 07/05/2021     Priority: Medium     Major depression, single episode, in complete remission (H) 07/05/2021     Priority: Medium     Menopausal and postmenopausal disorder 07/05/2021     Priority: Medium     Mixed hyperlipidemia 07/05/2021     Priority: Medium     Morbid obesity (H) 07/05/2021     Priority: Medium     Peripheral venous insufficiency 07/05/2021     Priority: Medium     Postmenopausal 07/05/2021     Priority: Medium     Restless legs 07/05/2021     Priority: Medium     Depression 04/28/2021     Priority: Medium     Vitamin D deficiency 04/28/2021     Priority: Medium     GERD (gastroesophageal reflux disease) 04/28/2021     Priority: Medium     Essential hypertension 04/28/2021     Priority: Medium     Valgus deformity of both feet 09/21/2016     Priority: Medium     Flat feet, bilateral 09/21/2016     Priority: Medium     Gait abnormality 09/21/2016     Priority: Medium     MS (multiple sclerosis) (H) 09/21/2016     Priority: Medium     Morbid obesity with BMI of 50.0-59.9, adult (H) 04/27/2016     Priority: Medium     Venous hypertension of lower extremity, bilateral 12/23/2015     Priority: Medium     Acquired lymphedema of leg 12/23/2015     Priority: Medium     Scar condition and fibrosis of skin 12/23/2015     Priority: Medium     Ankle contracture, left 12/23/2015     Priority: Medium     Labs:  Recent Labs   Lab Test 07/22/21  0757 09/26/20  1017   CRP 6.5*  --    HGB 9.6*  --    ALBUMIN  --  3.9       Ajith:  Ajith Score: 20    Specialty Bed:   Standard Med Surg mattress    Wound culture obtained: No    Edema:  Yes:  Localized    Anatomic  Site/Laterality: RLE    Reason for ongoing care:   assessment    Encounter Type:  Subsequent Encounter Wound Type:   Denudement:  Foreign body present? No    Tissue Damage:   Exposed fat layer    Related trauma: cellulitis    Assessment:  Patient with edema, erythema and scattered denudements with crusting areas from knee to ankle. Will continue current plan of care.    Tunneling/Undermining: No    Wound Bed: 100% Agranular with scattered areas of sloughing fibrinous tissue    Exudate: Yes Serous Large    Periwound Skin: Edema, Erythema and Painful    Treatment Plan: Viscopaste, ABD pads and roll gauze          Nursing care provided was dressing change and discharge planning r/t wound care.    Discussed plan of care with patient.    Outcomes and treatment recommendations are to promote skin integrity, contain exudate and promote wound healing.    Actions taken by WOC RN: 15 minutes of education and WOC Discharge recommendations entered.    Planned Follow Up: Weekly.    Plan for next visit: Reassess wound(s) and Reassess skin integrity.

## 2021-07-26 NOTE — PROGRESS NOTES
[facility]  Infectious Disease   Progress Note     Date of Admission:  7/21/2021    Assessment & Plan   Cellulitis right lower extremity, CRP improving.   Weeping ulcers  Lymphedema  Recent culture with MRSA Pseudomonas Enterococcus and Proteus     PLAN   Continue IV pip-tazo and IV vancomycin for now   and then hopefully oral antibiotics-note there is no PO option to cover above organisms (although with wound swab, unclear significance)  PO option for discharge could be doxycycline and augmentin through 8/3  Wound care  Leg elevation  Trend CRP    Son Rodriguez MD, MD  [facility]  Direct messaging: [employer]  On-Call ID provider: [phone], option: 9    ____________________________________________________    Interval History   First visit by me. Wounds dressed by wound care about an hour ago. Severe pain with change, patient tearful    I deferred exam due to pain. Will come back in am to review wounds.    Physical Exam   Vital Signs: Temp: 98.4  F (36.9  C) Temp src: Oral BP: 127/59 Pulse: 75   Resp: 16 SpO2: 95 % O2 Device: None (Room air)    Weight: 251 lbs .1 oz  Gen. appearance nontoxic  Eyes no conjunctivitis or icterus  Extremities bilat lymphedema, right leg wrapped, painful  Neurologic alert oriented no focal deficits    Results for LELO ALVAREZ (MRN [id number]) as of 7/23/2021 12:23   Ref. Range 7/22/2021 07:57   WBC Latest Ref Range: 4.0 - 11.0 10e3/uL 10.0   Hemoglobin Latest Ref Range: 11.7 - 15.7 g/dL 9.6 (L)   Hematocrit Latest Ref Range: 35.0 - 47.0 % 31.5 (L)   Platelet Count Latest Ref Range: 150 - 450 10e3/uL 322   RBC Count Latest Ref Range: 3.80 - 5.20 10e6/uL 3.71 (L)   MCV Latest Ref Range: 78 - 100 fL 85   MCH Latest Ref Range: 26.5 - 33.0 pg 25.9 (L)   MCHC Latest Ref Range: 31.5 - 36.5 g/dL 30.5 (L)   RDW Latest Ref Range: 10.0 - 15.0 % 14.6     Pseudomonas aeruginosa MRSA Coagulase Positive Staph       MARYBEL MARYBEL      Aztreonam 16  Intermediate       Cefazolin   >16  Resistant     Cefepime 8  Susceptible       Ceftazidime 4  Susceptible       Ciprofloxacin 2  Resistant       Clindamycin   <=0.5  Susceptible     Daptomycin   <=1  Susceptible     Doxycycline   2  Susceptible     Gentamicin >8  Resistant       Levofloxacin 4  Resistant       Linezolid   2  Susceptible     Meropenem 2  Susceptible       Oxacillin   >2  Resistant     Piperacillin/Tazobactam 16/4  Susceptible       Tobramycin <=2  Susceptible       Trimeth/Sulfa   <=1/19  Susceptible     Vancomycin   1  Susceptible             Susceptibility     Enterococcus faecalis     MARYBEL     Ampicillin 1  Susceptible        Data   Results for orders placed or performed during the hospital encounter of 07/21/21 (from the past 24 hour(s))   Creatinine   Result Value Ref Range    Creatinine 0.72 0.60 - 1.10 mg/dL    GFR Estimate 83 >60 mL/min/1.73m2   CRP inflammation   Result Value Ref Range    CRP 4.2 (H) 0.0-<0.8 mg/dL   Extra Tube    Narrative    The following orders were created for panel order Extra Tube.  Procedure                               Abnormality         Status                     ---------                               -----------         ------                     Extra Red Top Tube[[id number]]                               Final result               Extra Purple Top Tube[[id number]]                            Final result                 Please view results for these tests on the individual orders.   Extra Red Top Tube   Result Value Ref Range    Hold Specimen JIC    Extra Purple Top Tube   Result Value Ref Range    Hold Specimen JIC

## 2021-07-26 NOTE — PLAN OF CARE
Problem: Adult Inpatient Plan of Care  Goal: Plan of Care Review  7/26/2021 1717 by Bita Harman, RN  Outcome: Improving  Flowsheets  Taken 7/26/2021 1717  Progress: improving  Taken 7/22/2021 1845  Plan of Care Reviewed With: patient  7/26/2021 1438 by Bita Harman, RN  Outcome: Improving       Vitals stable. Patient has been reporting pain 9/10 ever since last dressing change. Patient requested lidocaine cream as it has worked at home. IV abx.

## 2021-07-26 NOTE — DISCHARGE SUMMARY
[facility] MEDICINE  DISCHARGE SUMMARY     Primary Care Physician: Francisco Ramirez  Admission Date: 7/21/2021   Discharge Provider: Raji Zacarias MD Discharge Date: 7/27/2021   Diet:   Active Diet and Nourishment Order   Procedures     Regular Diet Adult     Diet       Code Status: Full Code   Activity: DCACTIVITY: Activity as tolerated and no driving for today        Condition at Discharge: Good     REASON FOR PRESENTATION(See Admission Note for Details)   Elizabeth Kelley is a 75 year old female with a past medical history of recurrent cellulitis, leg ulcers, obesity, peptic ulcer disease, GERD, MS who was admitted on 7/21/2021 for right lower extremity cellulitis. Hospital course is notable for initiation of Zosyn and vancomycin and ID consultation, who guided antibiotic duration and treatment.    PRINCIPAL & ACTIVE DISCHARGE DIAGNOSES     Active Problems:    GERD (gastroesophageal reflux disease)    Essential hypertension    Major depression, single episode, in complete remission (H)    Mixed hyperlipidemia    Cellulitis of right lower extremity      PENDING LABS     Unresulted Labs Ordered in the Past 30 Days of this Admission     No orders found from 6/21/2021 to 7/22/2021.            PROCEDURES ( this hospitalization only)          RECOMMENDATIONS TO OUTPATIENT PROVIDER FOR F/U VISIT             DISPOSITION     Home    SUMMARY OF HOSPITAL COURSE:      Elizabeth Kelley is a 75 year old female with a past medical history of recurrent cellulitis, leg ulcers, obesity, peptic ulcer disease, GERD, MS who was admitted on 7/21/2021 for right lower extremity cellulitis. Hospital course is notable for initiation of Zosyn and vancomycin and ID consultation, who guided antibiotic duration and treatment.    Patient's cellulitis improved on broad-spectrum antibiotics and then transitioned to doxycycline and augmentin to continue through 8/3/21 as per ID. PCP follow-up.     Discharge  Medications with Med changes:     Current Discharge Medication List      START taking these medications    Details   amoxicillin-clavulanate (AUGMENTIN) 875-125 MG tablet Take 1 tablet by mouth every 12 hours for 7 days  Qty: 14 tablet, Refills: 0    Associated Diagnoses: Cellulitis of right lower extremity      doxycycline hyclate (VIBRAMYCIN) 100 MG capsule Take 1 capsule (100 mg) by mouth every 12 hours for 7 days  Qty: 14 capsule, Refills: 0    Associated Diagnoses: Cellulitis of right lower extremity      oxyCODONE (ROXICODONE) 5 MG tablet Take 1 tablet (5 mg) by mouth every 8 hours as needed for breakthrough pain or severe pain  Qty: 9 tablet, Refills: 0    Associated Diagnoses: Cellulitis of right lower extremity; MS (multiple sclerosis) (H); Acquired lymphedema of leg         CONTINUE these medications which have NOT CHANGED    Details   aspirin 81 mg chewable tablet Take 81 mg by mouth every evening       buPROPion (WELLBUTRIN SR) 150 MG 12 hr tablet Take 150 mg by mouth every morning       cholecalciferol, vitamin D3, 1,000 unit tablet [CHOLECALCIFEROL, VITAMIN D3, 1,000 UNIT TABLET] Take 2,000 Units by mouth daily.      citalopram (CELEXA) 40 MG tablet Take 40 mg by mouth every morning       furosemide (LASIX) 20 MG tablet Take 20 mg by mouth 2 times daily AM and afternoon (4PM)      multivitamin therapeutic (THERAGRAN) tablet [MULTIVITAMIN THERAPEUTIC (THERAGRAN) TABLET] Take 1 tablet by mouth daily.      naproxen sodium (ALEVE) 220 MG tablet Take 440 mg by mouth daily as needed       pantoprazole (PROTONIX) 40 MG tablet [PANTOPRAZOLE (PROTONIX) 40 MG TABLET] Take 40 mg by mouth daily.      !! potassium chloride ER (KLOR-CON M) 20 MEQ CR tablet Take 20 mEq by mouth every evening      !! potassium chloride SA (K-DUR,KLOR-CON) 20 MEQ tablet Take 40 mEq by mouth every morning       !! rOPINIRole (REQUIP) 1 MG tablet Take 1 mg by mouth At Bedtime      !! rOPINIRole (REQUIP) 1 MG tablet Take 0.5 mg by mouth 2  times daily AM and afternoon      simvastatin (ZOCOR) 40 MG tablet [SIMVASTATIN (ZOCOR) 40 MG TABLET] Take 40 mg by mouth bedtime.      spironolactone (ALDACTONE) 25 MG tablet [SPIRONOLACTONE (ALDACTONE) 25 MG TABLET] Take 25 mg by mouth daily.       !! - Potential duplicate medications found. Please discuss with provider.      STOP taking these medications       traMADol (ULTRAM) 50 MG tablet Comments:   Reason for Stopping:                     Rationale for medication changes:      Antibiotics regimen and duration as per ID        Consults   Infectious disease     PHARMACY TO DOSE VANCO  WOUND OSTOMY CONTINENCE NURSE  IP CONSULT  PHARMACY TO DOSE VANCO  INFECTIOUS DISEASES IP CONSULT  SOCIAL WORK IP CONSULT  WOUND OSTOMY CONTINENCE NURSE  IP CONSULT    Immunizations given this encounter     Most Recent Immunizations   Administered Date(s) Administered     COVID-19,PF,Pfizer 04/05/2021     Flu, Unspecified 08/30/2017     Influenza (IIV3) PF 12/11/2014     Influenza Vaccine IM > 6 months Valent IIV4 09/21/2020     Pneumo Conj 13-V (2010&after) 06/14/2018     Pneumococcal 23 valent 09/21/2020     TDAP Vaccine (Adacel) 10/14/2008           Anticoagulation Information      Recent INR results: No results for input(s): INR in the last 168 hours.  Warfarin doses (if applicable) or name of other anticoagulant: NA      SIGNIFICANT IMAGING FINDINGS     Results for orders placed or performed during the hospital encounter of 07/21/21   US Lower Extremity Venous Duplex Bilateral    Impression    IMPRESSION:    1.  Suboptimal assessment of calf veins due to technical limitations as described. No acute DVT in the imaged veins of the bilateral lower extremities.  2.  Mildly complex left popliteal fossa cyst.       SIGNIFICANT LABORATORY FINDINGS     Most Recent 3 CBC's:  Recent Labs   Lab Test 07/27/21  0816 07/24/21  0818 07/22/21  0757 07/21/21  1855 09/14/19  0046   WBC  --   --  10.0 9.0 8.4   HGB  --   --  9.6* 11.8 12.6    MCV  --   --  85 85 91    309 322 397 130*     Most Recent 3 BMP's:  Recent Labs   Lab Test 07/27/21  0816 07/26/21  0917 07/24/21  0818 07/22/21  0757 07/21/21  1855 09/26/20  1017   NA  --   --   --  140 141 141   POTASSIUM  --   --   --  4.0 5.2* 3.9   CHLORIDE  --   --   --  107 103 103   CO2  --   --   --  26 26 30   BUN  --   --   --  17 23 24   CR 0.69 0.72 0.69 0.74 0.82 0.79   ANIONGAP  --   --   --  7 12 8   JUNI  --   --   --  8.5 9.7 9.4   GLC  --   --   --  99 91 92     Most Recent 2 LFT's:  Recent Labs   Lab Test 09/26/20  1017 05/02/19  0944   AST 16 22   ALT 22 26   ALKPHOS 83 88   BILITOTAL 0.4 0.4       All cultures:  No results for input(s): CULT in the last 168 hours.      Discharge Orders        Home care nursing referral      Reason for your hospital stay    Right lower leg cellulitis, which is a skin infection.   Evaluation by Infectious Diseases.     Follow-up and recommended labs and tests     Follow up with primary care provider, Francisco Ramirez, within 7 days for hospital follow- up.  No follow up labs or test are needed.     Activity    Your activity upon discharge: activity as tolerated     MD face to face encounter    Documentation of Face to Face and Certification for Home Health Services    I certify that patient: Elizabeth Kelley is under my care and that I, or a nurse practitioner or physician's assistant working with me, had a face-to-face encounter that meets the physician face-to-face encounter requirements with this patient on: 7/27/2021.    This encounter with the patient was in whole, or in part, for the following medical condition, which is the primary reason for home health care:  75 year old female with a past medical history of recurrent cellulitis, leg ulcers, obesity, peptic ulcer disease, GERD, MS who was admitted on 7/21/2021 for right lower extremity cellulitis requiring ID evaluation and treatment and wound care.    I certify that, based on my findings,  the following services are medically necessary home health services: Nursing.    My clinical findings support the need for the above services because: Nurse is needed: wound care.    Further, I certify that my clinical findings support that this patient is homebound (i.e. absences from home require considerable and taxing effort and are for medical reasons or [employer] services or infrequently or of short duration when for other reasons) because: Patient is bedbound due to: severe pain.    Based on the above findings. I certify that this patient is confined to the home and needs intermittent skilled nursing care, physical therapy and/or speech therapy.  The patient is under my care, and I have initiated the establishment of the plan of care.  This patient will be followed by a physician who will periodically review the plan of care.  Physician/Provider to provide follow up care: Francisco Ramirez    Attending hospital physician (the Medicare certified [place] provider): Raji Zacarias MD  Physician Signature: See electronic signature associated with these discharge orders.  Date: 7/27/2021     Diet    Follow this diet upon discharge: Orders Placed This Encounter      Regular Diet Adult       Examination   Physical Exam   Temp:  [98  F (36.7  C)-98.4  F (36.9  C)] 98  F (36.7  C)  Pulse:  [70-75] 70  Resp:  [16] 16  BP: (126-128)/(58-59) 126/58  SpO2:  [93 %-95 %] 93 %  Wt Readings from Last 1 Encounters:   07/27/21 117.4 kg (258 lb 14.4 oz)       GENERAL:  Alert, appears comfortable, in no acute distress, appears stated age, in excellent spirits this morning   HEAD:  Normocephalic, without obvious abnormality, atraumatic   EYES:  PERRL, conjunctiva/corneas clear, no scleral icterus, EOM's intact   NOSE: Nares normal, septum midline, mucosa normal, no drainage   THROAT: Lips, mucosa, and tongue normal; teeth and gums normal, mouth moist   NECK: Supple, symmetrical, trachea midline   BACK:   Symmetric, no curvature,  ROM normal   LUNGS:   Clear to auscultation bilaterally, no rales, rhonchi, or wheezing, symmetric chest rise on inhalation, respirations unlabored   CHEST WALL:  No tenderness or deformity   HEART:  Regular rate and rhythm, S1 and S2 normal, no murmur, rub, or gallop    ABDOMEN:   Soft, non-tender, bowel sounds active all four quadrants, no masses, no organomegaly, no rebound or guarding   EXTREMITIES: Extremities normal, atraumatic, no cyanosis or edema    SKIN: RLE dressing secure and without any drainage or purulence; redness at edges even more further receded and markedly improved, again endorsed to be more improved by patient; other skin is dry to touch, no exanthems in the visualized areas   NEURO: Alert, oriented x 4, moves all four extremities freely, non-focal   PSYCH: Cooperative, behavior is appropriate         Please see EMR for more detailed significant labs, imaging, consultant notes etc.    IRaji MD, personally saw the patient today and spent greater than 30 minutes discharging this patient.    Raji Zacarias MD  Internal Medicine  [facility]  Phone: #[phone]    CC:Francisco Ramirez

## 2021-07-27 VITALS
RESPIRATION RATE: 16 BRPM | TEMPERATURE: 98 F | WEIGHT: 258.9 LBS | DIASTOLIC BLOOD PRESSURE: 58 MMHG | SYSTOLIC BLOOD PRESSURE: 126 MMHG | OXYGEN SATURATION: 93 % | HEIGHT: 61 IN | HEART RATE: 70 BPM | BODY MASS INDEX: 48.88 KG/M2

## 2021-07-27 LAB
CREAT SERPL-MCNC: 0.69 MG/DL (ref 0.6–1.1)
GFR SERPL CREATININE-BSD FRML MDRD: 87 ML/MIN/1.73M2
HOLD SPECIMEN: NORMAL
PLATELET # BLD AUTO: 294 10E3/UL (ref 150–450)

## 2021-07-27 PROCEDURE — 250N000013 HC RX MED GY IP 250 OP 250 PS 637: Performed by: HOSPITALIST

## 2021-07-27 PROCEDURE — 99232 SBSQ HOSP IP/OBS MODERATE 35: CPT | Performed by: INTERNAL MEDICINE

## 2021-07-27 PROCEDURE — 250N000011 HC RX IP 250 OP 636: Performed by: HOSPITALIST

## 2021-07-27 PROCEDURE — 82565 ASSAY OF CREATININE: CPT | Performed by: HOSPITALIST

## 2021-07-27 PROCEDURE — 36415 COLL VENOUS BLD VENIPUNCTURE: CPT | Performed by: HOSPITALIST

## 2021-07-27 PROCEDURE — 250N000013 HC RX MED GY IP 250 OP 250 PS 637: Performed by: INTERNAL MEDICINE

## 2021-07-27 PROCEDURE — 85049 AUTOMATED PLATELET COUNT: CPT | Performed by: HOSPITALIST

## 2021-07-27 PROCEDURE — 99239 HOSP IP/OBS DSCHRG MGMT >30: CPT | Performed by: HOSPITALIST

## 2021-07-27 RX ORDER — DOXYCYCLINE 100 MG/1
100 CAPSULE ORAL EVERY 12 HOURS
Qty: 14 CAPSULE | Refills: 0 | Status: SHIPPED | OUTPATIENT
Start: 2021-07-27 | End: 2021-08-02

## 2021-07-27 RX ORDER — DOXYCYCLINE HYCLATE 50 MG/1
100 CAPSULE ORAL EVERY 12 HOURS SCHEDULED
Status: DISCONTINUED | OUTPATIENT
Start: 2021-07-27 | End: 2021-07-27 | Stop reason: HOSPADM

## 2021-07-27 RX ORDER — OXYCODONE HYDROCHLORIDE 5 MG/1
5 TABLET ORAL EVERY 8 HOURS PRN
Qty: 9 TABLET | Refills: 0 | Status: SHIPPED | OUTPATIENT
Start: 2021-07-27 | End: 2021-12-01

## 2021-07-27 RX ADMIN — ENOXAPARIN SODIUM 40 MG: 100 INJECTION SUBCUTANEOUS at 00:52

## 2021-07-27 RX ADMIN — OXYCODONE HYDROCHLORIDE 5 MG: 5 TABLET ORAL at 04:58

## 2021-07-27 RX ADMIN — FUROSEMIDE 20 MG: 20 TABLET ORAL at 08:26

## 2021-07-27 RX ADMIN — Medication 2000 UNITS: at 08:26

## 2021-07-27 RX ADMIN — BUPROPION HYDROCHLORIDE 150 MG: 150 TABLET, FILM COATED, EXTENDED RELEASE ORAL at 06:58

## 2021-07-27 RX ADMIN — CITALOPRAM HYDROBROMIDE 40 MG: 20 TABLET ORAL at 06:58

## 2021-07-27 RX ADMIN — ACETAMINOPHEN 650 MG: 325 TABLET ORAL at 09:08

## 2021-07-27 RX ADMIN — DOXYCYCLINE HYCLATE 100 MG: 50 CAPSULE ORAL at 11:57

## 2021-07-27 RX ADMIN — SPIRONOLACTONE 25 MG: 25 TABLET, FILM COATED ORAL at 08:26

## 2021-07-27 RX ADMIN — PANTOPRAZOLE SODIUM 40 MG: 20 TABLET, DELAYED RELEASE ORAL at 06:57

## 2021-07-27 RX ADMIN — AMOXICILLIN AND CLAVULANATE POTASSIUM 1 TABLET: 875; 125 TABLET, FILM COATED ORAL at 11:57

## 2021-07-27 RX ADMIN — ACETAMINOPHEN 650 MG: 325 TABLET ORAL at 04:48

## 2021-07-27 RX ADMIN — PIPERACILLIN AND TAZOBACTAM 3.38 G: 3; .375 INJECTION, POWDER, LYOPHILIZED, FOR SOLUTION INTRAVENOUS at 01:12

## 2021-07-27 RX ADMIN — OXYCODONE HYDROCHLORIDE 5 MG: 5 TABLET ORAL at 09:08

## 2021-07-27 RX ADMIN — ROPINIROLE HYDROCHLORIDE 0.5 MG: 0.5 TABLET, FILM COATED ORAL at 08:26

## 2021-07-27 ASSESSMENT — MIFFLIN-ST. JEOR: SCORE: 1616.74

## 2021-07-27 NOTE — PROGRESS NOTES
[facility] MEDICINE PROGRESS NOTE      Identification/Summary: Elizabeth Kelley is a 75 year old female with a past medical history of recurrent cellulitis, leg ulcers, obesity, peptic ulcer disease, GERD, MS who was admitted on 7/21/2021 for right lower extremity cellulitis. Hospital course is notable for initiation of Zosyn and vancomycin. ID recommends continuing IV antibx for another day. Again anticipate discharge tomorrow on oral antibiotics if okay with ID.     Assessment and Plan:  Right lower extremity wound/cellulitis status post debridement 7/5/2021  Wound cultures from 7/5/2021: MRSA,  Pseudomonas resistant to Cipro, Enterococcus.  Patient completed 7-day course of clindamycin from 7/8/2021 to 7/15/2021, return with worsening redness and pain in the leg  Zosyn/vancomycin is recommended to continue, ID following.  Ultrasound -ve for DVT and this was negative.  Wound ostomy consult appreciated   Infectious disease consult for recommendation on antibiotics - appreciated   ID recommends continuing IV antibx for yet another day.    Hyperkalemia  Takes supplements, now resolved  On Aldactone and furosemide    Mood disorder/depression  Wellbutrin  mg daily  Celexa 40 mg daily    Hyperlipidemia  Statin    History of peptic ulcer disease/GERD  PPI    Restless leg  Ropinirole    Diet: Regular Diet Adult  DVT Prophylaxis:  Enoxaparin (Lovenox) SQ  Code Status: Full Code    Anticipated possible discharge tomorrow on oral antibiotics if okay with ID.     Interval History/Subjective:  NAEO.     In great spirits. No new complaints. Pain well-controlled this AM. Answered all of her questions.     Physical Exam/Objective:  Temp:  [97.8  F (36.6  C)-98.4  F (36.9  C)] 98.4  F (36.9  C)  Pulse:  [70-75] 75  Resp:  [16] 16  BP: (118-132)/(58-61) 127/59  SpO2:  [95 %] 95 %    Body mass index is 47.43 kg/m .  GENERAL:  Alert, appears comfortable, in no acute distress, appears stated age    HEAD:  Normocephalic, without obvious abnormality, atraumatic   EYES:  PERRL, conjunctiva/corneas clear, no scleral icterus, EOM's intact   NOSE: Nares normal, septum midline, mucosa normal, no drainage   THROAT: Lips, mucosa, and tongue normal; teeth and gums normal, mouth moist   NECK: Supple, symmetrical, trachea midline   BACK:   Symmetric, no curvature, ROM normal   LUNGS:   Clear to auscultation bilaterally, no rales, rhonchi, or wheezing, symmetric chest rise on inhalation, respirations unlabored   CHEST WALL:  No tenderness or deformity   HEART:  Regular rate and rhythm, S1 and S2 normal, no murmur, rub, or gallop    ABDOMEN:   Soft, non-tender, bowel sounds active all four quadrants, no masses, no organomegaly, no rebound or guarding   EXTREMITIES: Extremities normal, atraumatic, no cyanosis or edema    SKIN: RLE dressing,   Redness, ulceration    Redness at edges receding further, still endorsed to be improved further by patient; other skin is dry to touch, no exanthems in the visualized areas   NEURO: Alert, oriented x 4, moves all four extremities freely, non-focal   PSYCH: Cooperative, behavior is appropriate      Medications:   Personally Reviewed.  Medications       aspirin  81 mg Oral QPM     buPROPion  150 mg Oral QAM     citalopram  40 mg Oral QAM     enoxaparin ANTICOAGULANT  40 mg Subcutaneous Q12H     furosemide  20 mg Oral BID     pantoprazole  40 mg Oral Daily     piperacillin-tazobactam  3.375 g Intravenous Q8H     rOPINIRole  0.5 mg Oral BID     rOPINIRole  1 mg Oral At Bedtime     simvastatin  40 mg Oral At Bedtime     sodium chloride (PF)  3 mL Intracatheter Q8H     spironolactone  25 mg Oral Daily     vancomycin  1,500 mg Intravenous Q24H     Vitamin D3  2,000 Units Oral Daily       Data reviewed today: I personally reviewed all new medications, labs, imaging/diagnostics reports over the past 24 hours. Pertinent findings include:    Imaging:   No results found for this or any previous  visit (from the past 24 hour(s)).    Labs:  Most Recent 3 CBC's:  Recent Labs   Lab Test 07/24/21  0818 07/22/21 0757 07/21/21 1855 09/14/19  0046   WBC  --  10.0 9.0 8.4   HGB  --  9.6* 11.8 12.6   MCV  --  85 85 91    322 397 130*     Most Recent 3 BMP's:  Recent Labs   Lab Test 07/26/21  0917 07/24/21 0818 07/22/21 0757 07/21/21 1855 09/26/20  1017   NA  --   --  140 141 141   POTASSIUM  --   --  4.0 5.2* 3.9   CHLORIDE  --   --  107 103 103   CO2  --   --  26 26 30   BUN  --   --  17 23 24   CR 0.72 0.69 0.74 0.82 0.79   ANIONGAP  --   --  7 12 8   JUNI  --   --  8.5 9.7 9.4   GLC  --   --  99 91 92     Most Recent 2 LFT's:  Recent Labs   Lab Test 09/26/20  1017 05/02/19  0944   AST 16 22   ALT 22 26   ALKPHOS 83 88   BILITOTAL 0.4 0.4       Raji Zacarias MD  Internal Medicine  [facility]  Phone: #[phone]

## 2021-07-27 NOTE — PROGRESS NOTES
[facility]  Infectious Disease   Progress Note     Date of Admission:  7/21/2021    Assessment & Plan   Cellulitis right lower extremity, CRP improving.   Weeping ulcers  Lymphedema  Recent culture with MRSA Pseudomonas Enterococcus and Proteus     PLAN   Change to doxycycline and augmentin through 8/3  Continue wound care  Leg elevation    Son Rodriguez MD, MD  [facility]  Direct messaging: [employer] Paging  On-Call ID provider: [phone], option: 9    ____________________________________________________    Interval History   No events overnight. Pain better controlled. Tolerating antibiotics. Plans to continue wound clinic after discharge      Physical Exam   Vital Signs: Temp: 98  F (36.7  C) Temp src: Oral BP: 126/58 Pulse: 70   Resp: 16 SpO2: 93 % O2 Device: None (Room air)    Weight: 258 lbs 14.4 oz  Gen. appearance nontoxic  Eyes no conjunctivitis or icterus  Extremities bilat lymphedema, right lower leg is erythematous with weeping wounds, tender  Neurologic alert oriented no focal deficits    Results for LELO ALVAREZ (MRN [id number]) as of 7/23/2021 12:23   Ref. Range 7/22/2021 07:57   WBC Latest Ref Range: 4.0 - 11.0 10e3/uL 10.0   Hemoglobin Latest Ref Range: 11.7 - 15.7 g/dL 9.6 (L)   Hematocrit Latest Ref Range: 35.0 - 47.0 % 31.5 (L)   Platelet Count Latest Ref Range: 150 - 450 10e3/uL 322   RBC Count Latest Ref Range: 3.80 - 5.20 10e6/uL 3.71 (L)   MCV Latest Ref Range: 78 - 100 fL 85   MCH Latest Ref Range: 26.5 - 33.0 pg 25.9 (L)   MCHC Latest Ref Range: 31.5 - 36.5 g/dL 30.5 (L)   RDW Latest Ref Range: 10.0 - 15.0 % 14.6     Pseudomonas aeruginosa MRSA Coagulase Positive Staph       MARYBEL MARYBEL     Aztreonam 16  Intermediate       Cefazolin   >16  Resistant     Cefepime 8  Susceptible       Ceftazidime 4  Susceptible       Ciprofloxacin 2  Resistant       Clindamycin   <=0.5  Susceptible     Daptomycin   <=1  Susceptible     Doxycycline   2   Susceptible     Gentamicin >8  Resistant       Levofloxacin 4  Resistant       Linezolid   2  Susceptible     Meropenem 2  Susceptible       Oxacillin   >2  Resistant     Piperacillin/Tazobactam 16/4  Susceptible       Tobramycin <=2  Susceptible       Trimeth/Sulfa   <=1/19  Susceptible     Vancomycin   1  Susceptible             Susceptibility     Enterococcus faecalis     MARYBEL     Ampicillin 1  Susceptible        Data   Results for orders placed or performed during the hospital encounter of 07/21/21 (from the past 24 hour(s))   Creatinine   Result Value Ref Range    Creatinine 0.69 0.60 - 1.10 mg/dL    GFR Estimate 87 >60 mL/min/1.73m2   Platelet count   Result Value Ref Range    Platelet Count 294 150 - 450 10e3/uL   Extra Tube ([place] Draw)    Narrative    The following orders were created for panel order Extra Tube ([place] Draw).  Procedure                               Abnormality         Status                     ---------                               -----------         ------                     Extra Red Top Tube[[id number]]                               In process                   Please view results for these tests on the individual orders.

## 2021-07-27 NOTE — PROGRESS NOTES
Care Management Discharge Note    Discharge Date: 07/27/2021  Discharge Disposition: Home  Discharge Services: [facility]. HC order was placed for Skilled Nursing but patient DECLINES as she is still works and drives and prefers to continue with OP Wound Care Clinic. MD is aware and agreeable with plan.   Discharge DME: None   Discharge Transportation: family or friend will provide (S/O to transport)  Private pay costs discussed: Not applicable  Education Provided on the Discharge Plan: yes, patient declines CM needs and is not interested in HC services for RN, discharge instructions per bedside RN  Persons Notified of Discharge Plans: patient  Patient/Family in Agreement with the Plan: yes  Additional Information:  Chart reviewed. CM met with patient. She denies discharge needs. S/O to transport. CM updated bedside RN.         Virginia Bueno, RN

## 2021-07-27 NOTE — DISCHARGE INSTRUCTIONS
Home Care for Skilled Nursing was not arranged as patient DECLINES due to going to Wound Clinic already 3x per week. She also is not homebound status (still works and drives) and would not be covered by Medicare for HC services.     Right lower extremity wounds (continue with OP plan of care after discharge and follow up with tyrese Cummins CNP per plan of care):    Cleanse with normal saline   Treatment: wound treatment will include irrigation and dressings to promote autolytic debridement which will include: Aquacel Ag Advantage and baby diapers; every 1-2 days, applied in the clinic. Will order Hydrofera blue Ready Transfer x2 as primary dressing and transition once patient receives the dressing. Once we can confirm patient tolerates the dressings, dressing changes can be done at lymphedema therapy.    Edema: Patient completing lymphedema therapy two - three times weekly. Still only toleration tubular compression. Lymphedema pumps obtained-patient to hold until antibiotics therapeutic and pain reduced.

## 2021-07-27 NOTE — PLAN OF CARE
Problem: Adult Inpatient Plan of Care  Goal: Plan of Care Review  Outcome: Adequate for Discharge  Flowsheets (Taken 7/27/2021 1041)  Plan of Care Reviewed With: patient  Progress: improving     Problem: Adult Inpatient Plan of Care  Goal: Optimal Comfort and Wellbeing  Outcome: Adequate for Discharge       Vitals stable. Pain is controlled with tylenol and oxycodone. Wound dressing changed.

## 2021-07-30 ENCOUNTER — OFFICE VISIT (OUTPATIENT)
Dept: VASCULAR SURGERY | Facility: CLINIC | Age: 76
End: 2021-07-30
Attending: NURSE PRACTITIONER
Payer: COMMERCIAL

## 2021-07-30 VITALS
TEMPERATURE: 98.8 F | HEART RATE: 82 BPM | BODY MASS INDEX: 47.84 KG/M2 | HEIGHT: 61 IN | OXYGEN SATURATION: 97 % | WEIGHT: 253.4 LBS

## 2021-07-30 DIAGNOSIS — I89.0 ACQUIRED LYMPHEDEMA OF LEG: ICD-10-CM

## 2021-07-30 DIAGNOSIS — E66.01 MORBID OBESITY (H): ICD-10-CM

## 2021-07-30 DIAGNOSIS — L97.912 CHRONIC ULCER OF RIGHT LEG, WITH FAT LAYER EXPOSED (H): Primary | ICD-10-CM

## 2021-07-30 DIAGNOSIS — L90.5 SCAR CONDITION AND FIBROSIS OF SKIN: ICD-10-CM

## 2021-07-30 DIAGNOSIS — L08.9 RECURRENT INFECTION OF SKIN: ICD-10-CM

## 2021-07-30 DIAGNOSIS — I87.2 VENOUS INSUFFICIENCY OF BOTH LOWER EXTREMITIES: ICD-10-CM

## 2021-07-30 PROCEDURE — 99213 OFFICE O/P EST LOW 20 MIN: CPT | Performed by: NURSE PRACTITIONER

## 2021-07-30 PROCEDURE — G0463 HOSPITAL OUTPT CLINIC VISIT: HCPCS

## 2021-07-30 RX ORDER — MULTIVITAMIN/IRON/FOLIC ACID 18MG-0.4MG
1 TABLET ORAL EVERY MORNING
COMMUNITY

## 2021-07-30 ASSESSMENT — PAIN SCALES - GENERAL: PAINLEVEL: MILD PAIN (3)

## 2021-07-30 ASSESSMENT — MIFFLIN-ST. JEOR: SCORE: 1586.79

## 2021-07-30 NOTE — PROGRESS NOTES
[facility] VASCULAR - [place]     FOLLOW UP NOTE      Date of Service: 7/30/2021    Date Last Seen: 7/9/2021; 7/21/2021    Chief Complaint: Right lower leg ulcer    Pt returns to [facility] with regards to the above listed concern(s).  Pertinent medical history includes multiple sclerosis, obesity, acquired lymphedema following lymphectomies, and venous insufficiency s/p Right Lower Extremity interventions. She has a history of recurrent lower limb cellulitis and  Left Lower Extremity ulcers which have healed. She also has an altered gait secondary to bilateral valgus deformity, left ankle contracture and bilateral flat feel in conjunction with multiple sclerosis.    The patient was seen urgently on Monday 7/05/2021, where concern of infection was noted. Tissue was debrided and a swab culture obtained via Nugent method. Preliminary results showed 1+ gram positive cocci in pairs and no polymorphonuclear leukocytes. Patient started on levofloxacin (Levaquin) and clindamycin added upon final sensitivity results. Pain did not improve with outpatient therapy and the patient was hospitalized for leg cellulitis (7/21-7/27/2021); treated with Zosyn and vancomycin and discharged on Amoxicillin/clavulanic acid (Augmentin) and doxycycline (Monodox, Vibramycin) per ID. Per [facility]OC RN note (inpatient), unna boot applied per patient preference.     Subjective:  Today the patient reports feeling well; Denies fevers, chills. No shortness of breath. Pain is rated 3 out of 10 and localized to the wound/ulcer. Patient is applying Unna boot rolled gauze and diapers to her lower leg once to twice daily. She is not using compression at this time; she would like to resume using her pump. She is scheduled with ID next week re: recurrent infections of the Right Lower Extremity.     Recommended plan of care per Dr. Melendez are dilute bleach soaks, silver alginate, foam and abd under two layer compression. The  patient was unable to tolerate the bleach baths, she reports she has a long standing intolerance to bleach products in general. The patient also does not want to use the Hydrofera blue Ready dressing since her infection and leg got worse after this.       Allergies: Other environmental allergy, Adhesive [mecrylate], and Vicodin [hydrocodone-acetaminophen]      Medications:   Current Outpatient Medications:      amoxicillin-clavulanate (AUGMENTIN) 875-125 MG tablet, Take 1 tablet by mouth every 12 hours for 7 days, Disp: 14 tablet, Rfl: 0     aspirin 81 mg chewable tablet, Take 81 mg by mouth every evening , Disp: , Rfl:      buPROPion (WELLBUTRIN SR) 150 MG 12 hr tablet, Take 150 mg by mouth every morning , Disp: , Rfl:      cholecalciferol, vitamin D3, 1,000 unit tablet, [CHOLECALCIFEROL, VITAMIN D3, 1,000 UNIT TABLET] Take 2,000 Units by mouth daily., Disp: , Rfl:      citalopram (CELEXA) 40 MG tablet, Take 40 mg by mouth every morning , Disp: , Rfl:      doxycycline hyclate (VIBRAMYCIN) 100 MG capsule, Take 1 capsule (100 mg) by mouth every 12 hours for 7 days, Disp: 14 capsule, Rfl: 0     furosemide (LASIX) 20 MG tablet, Take 20 mg by mouth 2 times daily AM and afternoon (4PM), Disp: , Rfl:      multivitamin therapeutic (THERAGRAN) tablet, [MULTIVITAMIN THERAPEUTIC (THERAGRAN) TABLET] Take 1 tablet by mouth daily., Disp: , Rfl:      naproxen sodium (ALEVE) 220 MG tablet, Take 440 mg by mouth daily as needed , Disp: , Rfl:      oxyCODONE (ROXICODONE) 5 MG tablet, Take 1 tablet (5 mg) by mouth every 8 hours as needed for breakthrough pain or severe pain, Disp: 9 tablet, Rfl: 0     pantoprazole (PROTONIX) 40 MG tablet, [PANTOPRAZOLE (PROTONIX) 40 MG TABLET] Take 40 mg by mouth daily., Disp: , Rfl:      potassium chloride ER (KLOR-CON M) 20 MEQ CR tablet, Take 20 mEq by mouth every evening, Disp: , Rfl:      potassium chloride SA (K-DUR,KLOR-CON) 20 MEQ tablet, Take 40 mEq by mouth every morning , Disp: , Rfl:       "rOPINIRole (REQUIP) 1 MG tablet, Take 1 mg by mouth At Bedtime, Disp: , Rfl:      rOPINIRole (REQUIP) 1 MG tablet, Take 0.5 mg by mouth 2 times daily AM and afternoon, Disp: , Rfl:      simvastatin (ZOCOR) 40 MG tablet, [SIMVASTATIN (ZOCOR) 40 MG TABLET] Take 40 mg by mouth bedtime., Disp: , Rfl:      spironolactone (ALDACTONE) 25 MG tablet, [SPIRONOLACTONE (ALDACTONE) 25 MG TABLET] Take 25 mg by mouth daily., Disp: , Rfl:       History:   Past Medical History:   Diagnosis Date     Ankle contracture, left      Class 3 severe obesity due to excess calories without serious comorbidity with body mass index (BMI) of 45.0 to 49.9 in adult (H)      Combined gastric and duodenal ulcer      Depression      Dyslipidemia      Edema      Gait abnormality      GERD (gastroesophageal reflux disease)      Lymphedema of both lower extremities      Melanoma (H)     left upper arm     MS (multiple sclerosis) (H)      RLS (restless legs syndrome)      Skin ulcer of left lower leg (H)      Valgus deformity of both feet      Vitamin D deficiency          Physical Exam:    Pulse 82   Temp 98.8  F (37.1  C)   Ht 5' 1\" (1.549 m)   Wt 253 lb 6.4 oz (114.9 kg)   SpO2 97%   BMI 47.88 kg/m    Weight is 253 lbs 6.4 oz          Body mass index is 47.88 kg/m .    General:  Patient presents to clinic in no apparent distress.  Head: normocephalic atraumatic  Psychiatric:  Alert and oriented x3.   Respiratory: unlabored breathing; no cough  Integumentary:  Skin is uniformly warm, dry and pink.    Peripheral Vascular:  Stable, non pitting edema of the right lower leg with lipodermatosclerosis and significant scarring and fibrosis. No edema of the toes or foot, right.     Circumferential volume measures:       Ulceration(s)/Wound(s):   Wound/Ulcer location: RIGHT LOWER LEG   Size: 20.0 cm L x 25.2 cm W x 0.2 cm D   Edges: Attached with irregular borders.   Undermining: none  Base: pink with yellow " fibrin  Tissues: dermis  Thickness: partial  Exudate Type: serous  Exudate Amount: large   Michelle-Wound/Ulcer: no warmth or erythema  Odor: none      Laboratory/Diagnostics studes:    No components found for: SEDRATE  No results found for: CREATININE  No results found for: HGBA1C  Lab Results   Component Value Date    BUN 17 07/22/2021     Lab Results   Component Value Date    ALBUMIN 3.9 09/26/2020     No components found for: [id number]      Diagnoses:  1. Chronic ulcer of right leg, with fat layer exposed (H)    2. Acquired lymphedema of leg    3. Recurrent infection of skin    4. Venous insufficiency of both lower extremities    5. Scar condition and fibrosis of skin    6. Morbid obesity (H)         Photo:  7/30/2021  Right lower leg        Are any of these wounds new today: No; Location: NA.      Assessment/Plan:  1. Procedure: NA.     2. Treatment: wound treatment will include irrigation and dressings to promote autolytic debridement which will include: primary Sorbact Swab and secondary dressing of baby diapers with leg gussets removed, daily.     3. Edema: Coban compression. Patient to resume using lymphedema pumps.    4. Offloading: NA    5. Nutrition: NA    6. Diagnostics: NA    7. Medications: NA    8. Referrals: NA    9. Education: See above. Education provided regarding the above plan of care, expected changes in the wound/ulcer base and signs/symptoms of concern, as well as ordered labs and diagnostics. Patient verbalizes understanding and all questions answered to their satisfaction.        Impression:  Elizabeth Kelley is a 75 year old patient with extensive ulceration of the entire right lower leg complicated by recurrent infection, acquired lymphedema following lymphectomies, and venous insufficiency s/p Right Lower Extremity interventions. She has a history of Left Lower Extremity ulcers which have healed. She also has an altered gait secondary to bilateral valgus deformity, left ankle contracture  and bilateral flat feel in conjunction with multiple sclerosis. She continues to be ambulatory without assistive device. The patient has started lymphedema therapy to redirect lymph fluid back into the circulatory system.     Today, the Right Lower Extremity ulcer is limited to skin breakdown. Size is stable, there are no signs of infection. Will adjust plan of care to continue with antimicrobial layer that allows lymphatic drainage to pass through to super-absorbant dressing. Patient will apply mild compression with Coban but will resume pneumatic compression use to also help mobilize interstitial fluid to central vascular system.       Follow Up:  Patient will follow up with me in 1 weeks for reevaluation. They were instructed to call the clinic sooner with any signs or symptoms of infection or any further questions/concerns. Answered all questions.      Maria G Yeboah, MSN, CNP, CWOCN-AP  [facility]  [phone]

## 2021-08-02 ENCOUNTER — LAB (OUTPATIENT)
Dept: LAB | Facility: CLINIC | Age: 76
End: 2021-08-02

## 2021-08-02 ENCOUNTER — OFFICE VISIT (OUTPATIENT)
Dept: INFECTIOUS DISEASES | Facility: CLINIC | Age: 76
End: 2021-08-02
Payer: COMMERCIAL

## 2021-08-02 VITALS
DIASTOLIC BLOOD PRESSURE: 62 MMHG | BODY MASS INDEX: 47.24 KG/M2 | HEART RATE: 76 BPM | TEMPERATURE: 98.9 F | WEIGHT: 250 LBS | SYSTOLIC BLOOD PRESSURE: 122 MMHG

## 2021-08-02 DIAGNOSIS — L03.115 CELLULITIS OF RIGHT LOWER EXTREMITY: Primary | ICD-10-CM

## 2021-08-02 DIAGNOSIS — L03.115 CELLULITIS OF RIGHT LOWER EXTREMITY: ICD-10-CM

## 2021-08-02 LAB
ALBUMIN SERPL-MCNC: 3.4 G/DL (ref 3.5–5)
ALP SERPL-CCNC: 156 U/L (ref 45–120)
ALT SERPL W P-5'-P-CCNC: 31 U/L (ref 0–45)
ANION GAP SERPL CALCULATED.3IONS-SCNC: 13 MMOL/L (ref 5–18)
AST SERPL W P-5'-P-CCNC: 18 U/L (ref 0–40)
BASOPHILS # BLD AUTO: 0.1 10E3/UL (ref 0–0.2)
BASOPHILS NFR BLD AUTO: 1 %
BILIRUB SERPL-MCNC: 0.4 MG/DL (ref 0–1)
BUN SERPL-MCNC: 19 MG/DL (ref 8–28)
C REACTIVE PROTEIN LHE: 7.9 MG/DL (ref 0–0.8)
CALCIUM SERPL-MCNC: 10 MG/DL (ref 8.5–10.5)
CHLORIDE BLD-SCNC: 103 MMOL/L (ref 98–107)
CO2 SERPL-SCNC: 24 MMOL/L (ref 22–31)
CREAT SERPL-MCNC: 0.78 MG/DL (ref 0.6–1.1)
EOSINOPHIL # BLD AUTO: 0.4 10E3/UL (ref 0–0.7)
EOSINOPHIL NFR BLD AUTO: 3 %
ERYTHROCYTE [DISTWIDTH] IN BLOOD BY AUTOMATED COUNT: 14.4 % (ref 10–15)
ERYTHROCYTE [SEDIMENTATION RATE] IN BLOOD BY WESTERGREN METHOD: 85 MM/HR (ref 0–20)
GFR SERPL CREATININE-BSD FRML MDRD: 75 ML/MIN/1.73M2
GLUCOSE BLD-MCNC: 83 MG/DL (ref 70–125)
HCT VFR BLD AUTO: 36.9 % (ref 35–47)
HGB BLD-MCNC: 11.5 G/DL (ref 11.7–15.7)
IMM GRANULOCYTES # BLD: 0.1 10E3/UL
IMM GRANULOCYTES NFR BLD: 1 %
LYMPHOCYTES # BLD AUTO: 2 10E3/UL (ref 0.8–5.3)
LYMPHOCYTES NFR BLD AUTO: 20 %
MCH RBC QN AUTO: 26.4 PG (ref 26.5–33)
MCHC RBC AUTO-ENTMCNC: 31.2 G/DL (ref 31.5–36.5)
MCV RBC AUTO: 85 FL (ref 78–100)
MONOCYTES # BLD AUTO: 1.1 10E3/UL (ref 0–1.3)
MONOCYTES NFR BLD AUTO: 11 %
NEUTROPHILS # BLD AUTO: 6.6 10E3/UL (ref 1.6–8.3)
NEUTROPHILS NFR BLD AUTO: 65 %
PLATELET # BLD AUTO: 371 10E3/UL (ref 150–450)
POTASSIUM BLD-SCNC: 4.9 MMOL/L (ref 3.5–5)
PROT SERPL-MCNC: 7.1 G/DL (ref 6–8)
RBC # BLD AUTO: 4.35 10E6/UL (ref 3.8–5.2)
SODIUM SERPL-SCNC: 140 MMOL/L (ref 136–145)
WBC # BLD AUTO: 10.2 10E3/UL (ref 4–11)

## 2021-08-02 PROCEDURE — 87070 CULTURE OTHR SPECIMN AEROBIC: CPT | Performed by: INTERNAL MEDICINE

## 2021-08-02 PROCEDURE — 85652 RBC SED RATE AUTOMATED: CPT

## 2021-08-02 PROCEDURE — 99215 OFFICE O/P EST HI 40 MIN: CPT | Performed by: INTERNAL MEDICINE

## 2021-08-02 PROCEDURE — 87077 CULTURE AEROBIC IDENTIFY: CPT | Performed by: INTERNAL MEDICINE

## 2021-08-02 PROCEDURE — 80053 COMPREHEN METABOLIC PANEL: CPT

## 2021-08-02 PROCEDURE — 36415 COLL VENOUS BLD VENIPUNCTURE: CPT

## 2021-08-02 PROCEDURE — 85025 COMPLETE CBC W/AUTO DIFF WBC: CPT

## 2021-08-02 PROCEDURE — 87186 SC STD MICRODIL/AGAR DIL: CPT | Performed by: INTERNAL MEDICINE

## 2021-08-02 PROCEDURE — 86141 C-REACTIVE PROTEIN HS: CPT

## 2021-08-02 RX ORDER — DOXYCYCLINE 100 MG/1
100 CAPSULE ORAL EVERY 12 HOURS
Qty: 28 CAPSULE | Refills: 0 | Status: SHIPPED | OUTPATIENT
Start: 2021-08-02 | End: 2021-08-16

## 2021-08-02 RX ORDER — MEROPENEM 1 G/1
2 INJECTION, POWDER, FOR SOLUTION INTRAVENOUS EVERY 8 HOURS
Qty: 60 EACH | Refills: 0
Start: 2021-08-03 | End: 2021-08-13

## 2021-08-02 NOTE — PATIENT INSTRUCTIONS
Ramon Johnson,  Thank you for taking the time to see us today. We will repeat wound culture, labs and extend antibiotic. For Pseudomonas and slough on the wound, we will start the process to set up IV antibiotic, Meropenem via single lumen PICC line. Will modify antibiotic based on culture results.  Continue follow up with Wound care    Kathe Vargas MD  [facility]  [phone]

## 2021-08-02 NOTE — PROGRESS NOTES
[facility] INFECTIOUS DISEASE CLINIC    Infectious Disease   Progress Note       Patient was hospitalized at [facility] and discharged home. Patient was seen by Dr. Rodriguez and is new to me.       Assessment & Plan   Cellulitis right lower extremity   Weeping ulcers- see photos below, and photo from today 8/2/2021  Lymphedema  Recent culture with MRSA Pseudomonas Enterococcus and Proteus  She has been on doxycycline and Augmentin, has some improvement but still has ongoing large wound with slough     PLAN     1. Repeat wound culture  2. Continue wound care per wound care nurse/clinic team  3. Extend doxycycline and Augmentin, and plan on antipseudomonal coverage.  Pseudomonas was previously resistant to quinolones and there are no oral options available for this pathogen based on the patient's susceptibility results.  We will plan on PICC line placement and starting IV meropenem  4. Continue wound care  5. Leg elevation  6. Follow-up with infectious disease in 14 days.    Exam on 8/2/2021           Kathe Vargas MD  [facility]  [phone]    Total Time Spent 40 minutes with >50% of the time spent in counseling, education and care coordination, chart review            ____________________________________________________    Interval History     Wound is improving though still continues to have  Edema  Has been taking Augmentin and doxycycline  Previous note:  Patient was seen by Dr. Rodriguez in the hospital: Patient was also seen by Dr. Parham and Dr. Penaloza.  No events overnight. Pain better controlled. Tolerating antibiotics. Plans to continue wound clinic after discharge    Past Medical History:   Diagnosis Date     Ankle contracture, left      Class 3 severe obesity due to excess calories without serious comorbidity with body mass index (BMI) of 45.0 to 49.9 in adult (H)      Combined gastric and duodenal ulcer      Depression      Dyslipidemia      Edema       Gait abnormality      GERD (gastroesophageal reflux disease)      Lymphedema of both lower extremities      Melanoma (H)     left upper arm     MS (multiple sclerosis) (H)      RLS (restless legs syndrome)      Skin ulcer of left lower leg (H)      Valgus deformity of both feet      Vitamin D deficiency      Patient Active Problem List   Diagnosis     Venous hypertension of lower extremity, bilateral     Acquired lymphedema of leg     Scar condition and fibrosis of skin     Ankle contracture, left     Morbid obesity with BMI of 50.0-59.9, adult (H)     Valgus deformity of both feet     Flat feet, bilateral     Gait abnormality     MS (multiple sclerosis) (H)     Depression     Vitamin D deficiency     GERD (gastroesophageal reflux disease)     Essential hypertension     History of malignant melanoma of skin     Edema     Major depression, single episode, in complete remission (H)     Menopausal and postmenopausal disorder     Mixed hyperlipidemia     Morbid obesity (H)     Peripheral venous insufficiency     Postmenopausal     Restless legs     Cellulitis of right lower extremity        Social Connections:      Frequency of Communication with Friends and Family:      Frequency of Social Gatherings with Friends and Family:      Attends [employer] Services:      Active Member of Clubs or Organizations:      Attends Club or Organization Meetings:      Marital Status:      I have reviewed this patient's family history and updated it with pertinent information if needed.  Family History   Problem Relation Age of Onset     Uterine Cancer Mother      Hyperlipidemia Mother      Hypertension Mother      Multiple Sclerosis Mother      Asthma Sister      Obesity Sister      Hyperlipidemia Sister      Hypertension Sister      Multiple Sclerosis Sister      Arthritis Sister      Colon Cancer Paternal Aunt      Breast Cancer Paternal Aunt      Hypertension Paternal Aunt      Hyperlipidemia Paternal Aunt      Brain Cancer Father       Arthritis Maternal Uncle      Arthritis Maternal Grandmother      Early Death Maternal Grandfather      Arthritis Paternal Grandmother      Arthritis Paternal Grandfather            Physical Exam   Vital Signs: Temp: 98.9  F (37.2  C)   BP: 122/62 Pulse: 76            Weight: 250 lbs 0 oz  Gen. NAD  Eyes no conjunctivitis or icterus  Extremities bilat lymphedema, right lower leg is erythematous with weeping wounds, tender  Photo on 8/2/2021            Neurologic alert oriented no focal deficits    Results for LELO ALVAREZ (MRN [id number]) as of 7/23/2021 12:23   Ref. Range 7/22/2021 07:57   WBC Latest Ref Range: 4.0 - 11.0 10e3/uL 10.0   Hemoglobin Latest Ref Range: 11.7 - 15.7 g/dL 9.6 (L)   Hematocrit Latest Ref Range: 35.0 - 47.0 % 31.5 (L)   Platelet Count Latest Ref Range: 150 - 450 10e3/uL 322   RBC Count Latest Ref Range: 3.80 - 5.20 10e6/uL 3.71 (L)   MCV Latest Ref Range: 78 - 100 fL 85   MCH Latest Ref Range: 26.5 - 33.0 pg 25.9 (L)   MCHC Latest Ref Range: 31.5 - 36.5 g/dL 30.5 (L)   RDW Latest Ref Range: 10.0 - 15.0 % 14.6     Pseudomonas aeruginosa MRSA Coagulase Positive Staph       MARYBEL MARYBEL     Aztreonam 16  Intermediate       Cefazolin   >16  Resistant     Cefepime 8  Susceptible       Ceftazidime 4  Susceptible       Ciprofloxacin 2  Resistant       Clindamycin   <=0.5  Susceptible     Daptomycin   <=1  Susceptible     Doxycycline   2  Susceptible     Gentamicin >8  Resistant       Levofloxacin 4  Resistant       Linezolid   2  Susceptible     Meropenem 2  Susceptible       Oxacillin   >2  Resistant     Piperacillin/Tazobactam 16/4  Susceptible       Tobramycin <=2  Susceptible       Trimeth/Sulfa   <=1/19  Susceptible     Vancomycin   1  Susceptible             Susceptibility     Enterococcus faecalis     MARYBEL     Ampicillin 1  Susceptible          RADIOLOGY:    US Lower Extremity Venous Duplex Bilateral    Result Date: 7/22/2021  EXAM: US LOWER EXTREMITY VENOUS DUPLEX BILATERAL LOCATION: [facility]  [facility] DATE/TIME: 7/22/2021 3:56 PM INDICATION: edema, cellulitis COMPARISON: None. TECHNIQUE: Venous Duplex ultrasound of bilateral lower extremities with and without compression, augmentation and duplex. Color flow and spectral Doppler with waveform analysis performed. Thickened skin of the right calf with nonvisualization of the peritoneal veins. Edematous/weeping skin of the left calf with known MRSA infection. FINDINGS: Exam includes the common femoral, femoral, popliteal veins as well as segmentally visualized deep calf veins and greater saphenous vein. RIGHT: No deep vein thrombosis. No superficial thrombophlebitis. No popliteal cyst. LEFT: No deep vein thrombosis. No superficial thrombophlebitis. Complex fluid collection medial to the left knee measures 8 mm in thickness and 6.2 cm in length.     IMPRESSION: 1.  Suboptimal assessment of calf veins due to technical limitations as described. No acute DVT in the imaged veins of the bilateral lower extremities. 2.  Mildly complex left popliteal fossa cyst.

## 2021-08-03 ENCOUNTER — TELEPHONE (OUTPATIENT)
Dept: INFECTIOUS DISEASES | Facility: CLINIC | Age: 76
End: 2021-08-03

## 2021-08-03 ENCOUNTER — HOME INFUSION (PRE-WILLOW HOME INFUSION) (OUTPATIENT)
Dept: PHARMACY | Facility: CLINIC | Age: 76
End: 2021-08-03

## 2021-08-03 NOTE — TELEPHONE ENCOUNTER
MARGI Alexander and Ann called to inform pt's insurance covers home infusion. They will call pt to set up picc line and first dose after discussing with pt.

## 2021-08-04 ENCOUNTER — HOSPITAL ENCOUNTER (OUTPATIENT)
Dept: INTERVENTIONAL RADIOLOGY/VASCULAR | Facility: HOSPITAL | Age: 76
Discharge: HOME | End: 2021-08-04
Attending: INTERNAL MEDICINE | Admitting: INTERNAL MEDICINE
Payer: COMMERCIAL

## 2021-08-04 DIAGNOSIS — L03.115 CELLULITIS OF RIGHT LOWER EXTREMITY: ICD-10-CM

## 2021-08-04 LAB — BACTERIA WND CULT: ABNORMAL

## 2021-08-04 PROCEDURE — 36569 INSJ PICC 5 YR+ W/O IMAGING: CPT

## 2021-08-04 NOTE — PROCEDURES
"Procedures  PICC Line Insertion Procedure Note  Pt. Name: Elizabeth Kelley  MRN:        [id number]    Procedure: Insertion of a  single Lumen  4 fr  Bard SOLO (valved) Power PICC, Lot number [id number]    Indications: antibiotics    Contraindications : none noted    Procedure Details   Patient identified with 2 identifiers and \"Time Out\" conducted.  .     Central line insertion bundle followed: hand hygeine performed prior to procedure, site cleansed with cholraprep, hat, mask, sterile gloves,sterile gown worn, patient draped with maximum barrier head to toe drape, sterile field maintained.    The vein was assessed and found to be compressible and of adequate size. 3 ml 1% Lidocaine administered sq to the insertion site. A 4 Fr PICC was inserted into the brachial vein of the right arm with ultrasound guidance. One attempt(s) required to access vein.   Catheter threaded without difficulty. Good blood return noted.    Modified Seldinger Technique used for insertion.    The 8 sharps that are included in the PICC insertion kit were accounted for and disposed of in the sharps container prior to breakdown of the sterile field.    Catheter secured with Statlock, biopatch and Tegaderm dressing applied.    Findings:  Total catheter length  42 cm, with 2 cm exposed. Mid upper arm circumference is 38 cm. Catheter was flushed with 10 cc NS. Patient  tolerated procedure well.    Tip placement verified by 3cg tip location technology . Tip placement in the SVC.    CLABSI prevention brochure left at bedside.    Patient's primary RN notified PICC is ready for use.    Comments:      Avel Butler RN Picc Team  [facility]        "

## 2021-08-06 ENCOUNTER — HOME INFUSION (PRE-WILLOW HOME INFUSION) (OUTPATIENT)
Dept: PHARMACY | Facility: CLINIC | Age: 76
End: 2021-08-06

## 2021-08-09 ENCOUNTER — HOME INFUSION (PRE-WILLOW HOME INFUSION) (OUTPATIENT)
Dept: PHARMACY | Facility: CLINIC | Age: 76
End: 2021-08-09

## 2021-08-09 ENCOUNTER — TELEPHONE (OUTPATIENT)
Dept: INFECTIOUS DISEASES | Facility: CLINIC | Age: 76
End: 2021-08-09

## 2021-08-09 NOTE — TELEPHONE ENCOUNTER
[facility] pharm inquiring on LOT, per Dr Vargas, iv abx extended until OV 8/16/21.  Orders received.

## 2021-08-10 ENCOUNTER — HOME INFUSION (PRE-WILLOW HOME INFUSION) (OUTPATIENT)
Dept: PHARMACY | Facility: CLINIC | Age: 76
End: 2021-08-10

## 2021-08-10 ENCOUNTER — LAB REQUISITION (OUTPATIENT)
Dept: LAB | Facility: CLINIC | Age: 76
End: 2021-08-10
Payer: COMMERCIAL

## 2021-08-10 LAB
AST SERPL W P-5'-P-CCNC: 21 U/L (ref 0–40)
BASOPHILS # BLD AUTO: 0.1 10E3/UL (ref 0–0.2)
BASOPHILS NFR BLD AUTO: 1 %
BUN SERPL-MCNC: 18 MG/DL (ref 8–28)
C REACTIVE PROTEIN LHE: 1.9 MG/DL (ref 0–0.8)
CREAT SERPL-MCNC: 0.74 MG/DL (ref 0.6–1.1)
EOSINOPHIL # BLD AUTO: 0.2 10E3/UL (ref 0–0.7)
EOSINOPHIL NFR BLD AUTO: 3 %
ERYTHROCYTE [DISTWIDTH] IN BLOOD BY AUTOMATED COUNT: 14.4 % (ref 10–15)
ERYTHROCYTE [SEDIMENTATION RATE] IN BLOOD BY WESTERGREN METHOD: 67 MM/HR (ref 0–20)
GFR SERPL CREATININE-BSD FRML MDRD: 80 ML/MIN/1.73M2
HCT VFR BLD AUTO: 37.8 % (ref 35–47)
HGB BLD-MCNC: 11.6 G/DL (ref 11.7–15.7)
IMM GRANULOCYTES # BLD: 0 10E3/UL
IMM GRANULOCYTES NFR BLD: 1 %
LYMPHOCYTES # BLD AUTO: 1.9 10E3/UL (ref 0.8–5.3)
LYMPHOCYTES NFR BLD AUTO: 24 %
MCH RBC QN AUTO: 26 PG (ref 26.5–33)
MCHC RBC AUTO-ENTMCNC: 30.7 G/DL (ref 31.5–36.5)
MCV RBC AUTO: 85 FL (ref 78–100)
MONOCYTES # BLD AUTO: 0.8 10E3/UL (ref 0–1.3)
MONOCYTES NFR BLD AUTO: 9 %
NEUTROPHILS # BLD AUTO: 5 10E3/UL (ref 1.6–8.3)
NEUTROPHILS NFR BLD AUTO: 62 %
NRBC # BLD AUTO: 0 10E3/UL
NRBC BLD AUTO-RTO: 0 /100
PLATELET # BLD AUTO: 399 10E3/UL (ref 150–450)
RBC # BLD AUTO: 4.46 10E6/UL (ref 3.8–5.2)
WBC # BLD AUTO: 8 10E3/UL (ref 4–11)

## 2021-08-10 PROCEDURE — 84520 ASSAY OF UREA NITROGEN: CPT | Performed by: INTERNAL MEDICINE

## 2021-08-10 PROCEDURE — 85025 COMPLETE CBC W/AUTO DIFF WBC: CPT | Performed by: INTERNAL MEDICINE

## 2021-08-10 PROCEDURE — 86141 C-REACTIVE PROTEIN HS: CPT | Mod: ORL | Performed by: INTERNAL MEDICINE

## 2021-08-10 PROCEDURE — 82565 ASSAY OF CREATININE: CPT | Performed by: INTERNAL MEDICINE

## 2021-08-10 PROCEDURE — 84450 TRANSFERASE (AST) (SGOT): CPT | Performed by: INTERNAL MEDICINE

## 2021-08-10 PROCEDURE — 82565 ASSAY OF CREATININE: CPT | Mod: ORL | Performed by: INTERNAL MEDICINE

## 2021-08-10 PROCEDURE — 85652 RBC SED RATE AUTOMATED: CPT | Performed by: INTERNAL MEDICINE

## 2021-08-10 PROCEDURE — 84520 ASSAY OF UREA NITROGEN: CPT | Mod: ORL | Performed by: INTERNAL MEDICINE

## 2021-08-10 PROCEDURE — 86141 C-REACTIVE PROTEIN HS: CPT | Performed by: INTERNAL MEDICINE

## 2021-08-10 PROCEDURE — 84450 TRANSFERASE (AST) (SGOT): CPT | Mod: ORL | Performed by: INTERNAL MEDICINE

## 2021-08-10 PROCEDURE — 85652 RBC SED RATE AUTOMATED: CPT | Mod: ORL | Performed by: INTERNAL MEDICINE

## 2021-08-10 PROCEDURE — 85025 COMPLETE CBC W/AUTO DIFF WBC: CPT | Mod: ORL | Performed by: INTERNAL MEDICINE

## 2021-08-10 NOTE — PROGRESS NOTES
This is a recent snapshot of the patient's [facility] medical record.  For current drug dose and complete information and questions, call [phone] or In Basket pool, [facility] home infusion ([id number])  CSN Number:  [id number]

## 2021-08-11 ENCOUNTER — HOME INFUSION (PRE-WILLOW HOME INFUSION) (OUTPATIENT)
Dept: PHARMACY | Facility: CLINIC | Age: 76
End: 2021-08-11

## 2021-08-12 ENCOUNTER — HOME INFUSION (PRE-WILLOW HOME INFUSION) (OUTPATIENT)
Dept: PHARMACY | Facility: CLINIC | Age: 76
End: 2021-08-12

## 2021-08-12 DIAGNOSIS — L03.115 CELLULITIS OF RIGHT LOWER EXTREMITY: Primary | ICD-10-CM

## 2021-08-13 ENCOUNTER — OFFICE VISIT (OUTPATIENT)
Dept: VASCULAR SURGERY | Facility: CLINIC | Age: 76
End: 2021-08-13
Payer: COMMERCIAL

## 2021-08-13 ENCOUNTER — ANCILLARY PROCEDURE (OUTPATIENT)
Dept: ULTRASOUND IMAGING | Facility: HOSPITAL | Age: 76
End: 2021-08-13
Attending: INTERNAL MEDICINE
Payer: COMMERCIAL

## 2021-08-13 ENCOUNTER — HOME INFUSION (PRE-WILLOW HOME INFUSION) (OUTPATIENT)
Dept: PHARMACY | Facility: CLINIC | Age: 76
End: 2021-08-13

## 2021-08-13 VITALS — HEART RATE: 88 BPM | TEMPERATURE: 98.3 F | SYSTOLIC BLOOD PRESSURE: 132 MMHG | DIASTOLIC BLOOD PRESSURE: 60 MMHG

## 2021-08-13 DIAGNOSIS — I87.2 VENOUS INSUFFICIENCY OF BOTH LOWER EXTREMITIES: ICD-10-CM

## 2021-08-13 DIAGNOSIS — L08.9 RECURRENT INFECTION OF SKIN: ICD-10-CM

## 2021-08-13 DIAGNOSIS — I89.0 ACQUIRED LYMPHEDEMA OF LEG: ICD-10-CM

## 2021-08-13 DIAGNOSIS — L03.115 CELLULITIS OF RIGHT LOWER EXTREMITY: ICD-10-CM

## 2021-08-13 DIAGNOSIS — E66.01 MORBID OBESITY (H): ICD-10-CM

## 2021-08-13 DIAGNOSIS — L90.5 SCAR CONDITION AND FIBROSIS OF SKIN: ICD-10-CM

## 2021-08-13 DIAGNOSIS — L97.912 CHRONIC ULCER OF RIGHT LEG, WITH FAT LAYER EXPOSED (H): Primary | ICD-10-CM

## 2021-08-13 PROCEDURE — 36569 INSJ PICC 5 YR+ W/O IMAGING: CPT

## 2021-08-13 PROCEDURE — G0463 HOSPITAL OUTPT CLINIC VISIT: HCPCS | Mod: 25

## 2021-08-13 PROCEDURE — 99213 OFFICE O/P EST LOW 20 MIN: CPT | Performed by: NURSE PRACTITIONER

## 2021-08-13 ASSESSMENT — PAIN SCALES - GENERAL: PAINLEVEL: MODERATE PAIN (5)

## 2021-08-13 NOTE — PATIENT INSTRUCTIONS
"Wound Care Instructions    Right lower leg:  Cleanse with wound wash or bottled water. Use non-sterile 4x4 gauze to gently remove any loosening tissue or debris and pat dry with non-sterile gauze.     Primary Dressing: Apply Sorbact Swab 2.5\"x3.5\" over entire area of ulceration.     Secondary dressing: Cover with baby diapers with leg gussets removed.    Change daily.    Secure with Coban.    Activity:  -Sleep in bed at night.  -Twice daily (1 hour between normal meal times) lay flat on couch or bed to reduce edema. Re-tighten compression before getting up.  -Perform ankle circles and pumps while lying flat to bring fluid up out of legs.  -Walk as much as tolerated, this is good for edema.    Diet:  -Avoid salt and sugar as this retains water and will worsen edema.    Bathing:  -You may shower with waterproof dressing cover.  -Do not soak ulcers.  -Do not leave open to air.     SEEK MEDICAL CARE IF:    You have an increase in swelling, pain, or redness around the wound.    You have an increase in the amount of pus coming from the wound.    There is a bad smell coming from the wound.    The wound appears to be worsening/enlarging    You have a fever greater than 101.5 F      It is ok to continue current wound care treatment/products for the next 2-3 days until new wound care supplies are ordered and arrive. If longer than this please contact our office at [phone].    Maria G Yeboah, MSN, CNP, CWOCN-AP  [facility]  [phone]   "

## 2021-08-13 NOTE — PROGRESS NOTES
[facility] - [place]     FOLLOW UP NOTE      Date of Service: 7/30/2021    Date Last Seen: 7/9/2021; 7/21/2021    Chief Complaint: Right lower leg ulcer    Pt returns to [facility] with regards to the above listed concern(s).  Pertinent medical history includes multiple sclerosis, obesity, acquired lymphedema following lymphectomies, and venous insufficiency s/p Right Lower Extremity interventions. She has a history of recurrent lower limb cellulitis and  Left Lower Extremity ulcers which have healed. She also has an altered gait secondary to bilateral valgus deformity, left ankle contracture and bilateral flat feel in conjunction with multiple sclerosis.    The patient was seen urgently on Monday 7/05/2021, where concern of infection was noted. Tissue was debrided and a swab culture obtained via Nugent method. Preliminary results showed 1+ gram positive cocci in pairs and no polymorphonuclear leukocytes. Patient started on levofloxacin (Levaquin) and clindamycin added upon final sensitivity results. Pain did not improve with outpatient therapy and the patient was hospitalized for leg cellulitis (7/21-7/27/2021); treated with Zosyn and vancomycin and discharged on Amoxicillin/clavulanic acid (Augmentin) and doxycycline (Monodox, Vibramycin) per ID. She has since seen ID and Amoxicillin/clavulanic acid (Augmentin) and doxycycline (Monodox, Vibramycin) have been extended with the addition of meropenem.     Subjective:  Today the patient reports feeling well; Denies fevers, chills. No shortness of breath. Pain is rated 2 out of 10 and localized to the wound/ulcer. Patient is applying Sorbact Swab and diapers to her leg. Compression applied with tubular compression. Pumps on hold per ID.       Allergies: Other environmental allergy, Adhesive tape, Adhesive [mecrylate], and Vicodin [hydrocodone-acetaminophen]      Medications:   Current Outpatient Medications:       amoxicillin-clavulanate (AUGMENTIN) 875-125 MG tablet, Take 1 tablet by mouth every 12 hours for 14 days, Disp: 28 tablet, Rfl: 0     aspirin 81 mg chewable tablet, Take 81 mg by mouth every evening , Disp: , Rfl:      buPROPion (WELLBUTRIN SR) 150 MG 12 hr tablet, Take 150 mg by mouth every morning , Disp: , Rfl:      cholecalciferol, vitamin D3, 1,000 unit tablet, [CHOLECALCIFEROL, VITAMIN D3, 1,000 UNIT TABLET] Take 2,000 Units by mouth daily., Disp: , Rfl:      citalopram (CELEXA) 40 MG tablet, Take 40 mg by mouth every morning , Disp: , Rfl:      doxycycline hyclate (VIBRAMYCIN) 100 MG capsule, Take 1 capsule (100 mg) by mouth every 12 hours for 14 days, Disp: 28 capsule, Rfl: 0     furosemide (LASIX) 20 MG tablet, Take 20 mg by mouth 2 times daily AM and afternoon (4PM), Disp: , Rfl:      meropenem (MERREM) 1 g vial, Inject 2,000 mg (2 g) into the vein every 8 hours for 10 days, Disp: 60 each, Rfl: 0     multivitamin therapeutic (THERAGRAN) tablet, [MULTIVITAMIN THERAPEUTIC (THERAGRAN) TABLET] Take 1 tablet by mouth daily., Disp: , Rfl:      multivitamin w/minerals (CENTRUM ADULTS) tablet, as directed, Disp: , Rfl:      naproxen sodium (ALEVE) 220 MG tablet, Take 440 mg by mouth daily as needed , Disp: , Rfl:      oxyCODONE (ROXICODONE) 5 MG tablet, Take 1 tablet (5 mg) by mouth every 8 hours as needed for breakthrough pain or severe pain, Disp: 9 tablet, Rfl: 0     pantoprazole (PROTONIX) 40 MG tablet, [PANTOPRAZOLE (PROTONIX) 40 MG TABLET] Take 40 mg by mouth daily., Disp: , Rfl:      potassium chloride ER (KLOR-CON M) 20 MEQ CR tablet, Take 20 mEq by mouth every evening, Disp: , Rfl:      potassium chloride SA (K-DUR,KLOR-CON) 20 MEQ tablet, Take 40 mEq by mouth every morning , Disp: , Rfl:      rOPINIRole (REQUIP) 1 MG tablet, Take 1 mg by mouth At Bedtime, Disp: , Rfl:      rOPINIRole (REQUIP) 1 MG tablet, Take 0.5 mg by mouth 2 times daily AM and afternoon, Disp: , Rfl:      simvastatin (ZOCOR) 40 MG  tablet, [SIMVASTATIN (ZOCOR) 40 MG TABLET] Take 40 mg by mouth bedtime., Disp: , Rfl:      spironolactone (ALDACTONE) 25 MG tablet, [SPIRONOLACTONE (ALDACTONE) 25 MG TABLET] Take 25 mg by mouth daily., Disp: , Rfl:       History:   Past Medical History:   Diagnosis Date     Ankle contracture, left      Class 3 severe obesity due to excess calories without serious comorbidity with body mass index (BMI) of 45.0 to 49.9 in adult (H)      Combined gastric and duodenal ulcer      Depression      Dyslipidemia      Edema      Gait abnormality      GERD (gastroesophageal reflux disease)      Lymphedema of both lower extremities      Melanoma (H)     left upper arm     MS (multiple sclerosis) (H)      RLS (restless legs syndrome)      Skin ulcer of left lower leg (H)      Valgus deformity of both feet      Vitamin D deficiency          Physical Exam:    /60   Pulse 88   Temp 98.3  F (36.8  C)   Weight is 0 lbs 0 oz          There is no height or weight on file to calculate BMI.    General:  Patient presents to clinic in no apparent distress.  Head: normocephalic atraumatic  Psychiatric:  Alert and oriented x3.   Respiratory: unlabored breathing; no cough  Integumentary:  Skin is uniformly warm, dry and pink.    Peripheral Vascular:  Stable, non pitting edema of the right lower leg with lipodermatosclerosis and significant scarring and fibrosis. No edema of the toes or foot, right.     Circumferential volume measures:       Ulceration(s)/Wound(s):   Wound/Ulcer location: RIGHT LOWER LEG   Size: 20.0 cm L x 25.0 cm W x 0.2 cm D   Edges: Attached with irregular borders.   Undermining: none  Base: pink with yellow fibrin and scattered areas of desiccated adipose tissue.   Tissues: dermis  Thickness: partial  Exudate Type: serous  Exudate Amount: copious  Michelle-Wound/Ulcer: no warmth or erythema  Odor: none      Laboratory/Diagnostics studes:    No components found for: SEDRATE  No results found for: CREATININE  No results  found for: HGBA1C  Lab Results   Component Value Date    BUN 18 08/10/2021     Lab Results   Component Value Date    ALBUMIN 3.4 (L) 08/02/2021     No components found for: [id number]      Diagnoses:  1. Chronic ulcer of right leg, with fat layer exposed (H)    2. Acquired lymphedema of leg    3. Recurrent infection of skin    4. Venous insufficiency of both lower extremities    5. Scar condition and fibrosis of skin    6. Morbid obesity (H)         Photo:  8/13/2021   Right lower leg        Are any of these wounds new today: No; Location: NA.      Assessment/Plan:  1. Procedure: NA.     2. Treatment: wound treatment will include irrigation and dressings to promote autolytic debridement which will include: primary Sorbact Swab and secondary dressing of baby diapers with leg gussets removed, daily.     3. Edema: tubular and Coban compression. Lymphedema pumps on hold.     4. Offloading: NA    5. Nutrition: NA    6. Diagnostics: NA    7. Medications: NA    8. Referrals: NA    9. Education: See above. Education provided regarding the above plan of care, expected changes in the wound/ulcer base and signs/symptoms of concern, as well as ordered labs and diagnostics. Patient verbalizes understanding and all questions answered to their satisfaction.        Impression:  Elizabeth Kelley is a 76 year old patient with extensive ulceration of the entire right lower leg complicated by recurrent infection, acquired lymphedema following lymphectomies, and venous insufficiency s/p Right Lower Extremity interventions. She has a history of Left Lower Extremity ulcers which have healed. She also has an altered gait secondary to bilateral valgus deformity, left ankle contracture and bilateral flat feel in conjunction with multiple sclerosis. She continues to be ambulatory without assistive device. The patient has started lymphedema therapy to redirect lymph fluid back into the circulatory system.     Today, the Right Lower Extremity  ulcer is limited to skin breakdown. Size is stable, there are no signs of infection. Will continue plan of care with antimicrobial layer that allows lymphatic drainage to pass through to super-absorbant dressing. Patient will apply mild compression with tubular and Coban compression.       Follow Up:  Patient will follow up with me in 2 weeks for reevaluation. They were instructed to call the clinic sooner with any signs or symptoms of infection or any further questions/concerns. Answered all questions.      Maria G Yeboah, MSN, CNP, CWOCN-AP  [facility]  [phone]

## 2021-08-13 NOTE — LETTER
[facility]    [facility]           Fax: [phone]            Customer Service: [phone]    Wound Dressing Rx and Order Form  Order Status: reorder  Verbal: Malu  Date: 2021     Elizabeth Kelley  Gender: female  : 1945  [address]  [place] [address]  [phone] (home)     Medical Record: [id number]  Primary Care Provider: Francisco Ramirez      ICD-10-CM    1. Chronic ulcer of right leg, with fat layer exposed (H)  L97.912    2. Acquired lymphedema of leg  I89.0    3. Recurrent infection of skin  L08.9    4. Venous insufficiency of both lower extremities  I87.2    5. Scar condition and fibrosis of skin  L90.5    6. Morbid obesity (H)  E66.01          Insurance Info:  INSURER: Payor: [employer] / Plan: [facility] [employer] / Product Type: HMO /   Policy ID#:  [id number]    Physician Info:   Name:  FARIBA BENTON     Dept Address/Phones:   [address] [place] [address], [address]  [facility] [address] [phone]  Fax: [phone]    Lymphedema circumferential measurements (in cm):  Circumferential Measures 6/10/2021 2021 2021 2021 2021   Right just above MTP 25.5 23.6 24.8 24 23.3   Right Ankle 25.5 25.4 27 26 25.2   Right Widest Calf 61 56.6 58 59.5 61.0   Right Thigh Up 10cm 78 - - - 78.8   Right Knee to Ankle - - - - -   Left - just above MTP 23.5 22.6 23.8 23 24   Left Ankle 28 24.2 26.5 25.5 25   Left Widest Calf 54 53.6 54.6 53.5 51.5   Left Thigh Up 10cm 71 - - 68.5 77.2   Left Knee to Ankle - - - - -         Wound info:  PICC Single Lumen 21 Right Brachial vein lateral (Active)   Site Assessment WDL 21 1547   Line Status Blood return noted;Saline locked 21 1547   External Cath Length (cm) 2 cm 21 154   Extremity Circumference (cm) 38 cm 21 154   Dressing Intervention Chlorhexidine patch;Securing device;Transparent 21 154   Dressing Change Due 21 154  "  Lumen A - Cap Change Due 08/11/21 08/04/21 1547   Line Necessity Yes, meets criteria 08/04/21 1547   Number of days: 9       Wound Leg Other (comment);Ulceration (Active)   Wound Bed Drainage;Red;Tan 07/27/21 1000   Michelle-wound Assessment Edema;Erythema;Excoriated 07/21/21 2324   Drainage Amount Moderate 07/27/21 1000   Drainage Color/Characteristics Mcgill 07/27/21 1000   Wound Care/Cleansing Normal saline 07/27/21 1000   Dressing Dry gauze 07/27/21 1000   Dressing Status New dressing 07/27/21 1000   Number of days: 23       VASC Wound right lower leg (Active)   Pre Size Length 20 08/13/21 1300   Pre Size Width 25 08/13/21 1300   Pre Size Depth 0.2 08/13/21 1300   Pre Total Sq cm 500 08/13/21 1300   Number of days: 107        Drainage: heavy  Thickness:  full  Duration of Need: 30 DAYS  Days Supply: 30 DAYS  Start Date: 8/13/21  Starter Kit: none  Qualifying wound/Debridement: Yes      Dressing Type Brand Size Number of pieces Frequency of change    Primary sorbact swab  2.5\"x3.5\" 30 DAILY and as needed       Note: If total out of pocket is more than $50.00 please contact the patient before processing order.     OK to forward to covered supplier.    Electronically Signed Physician:  FARIBA BENTON             Date: August 13, 2021          Wound Care Instructions    Right lower leg:  Cleanse with wound wash or bottled water. Use non-sterile 4x4 gauze to gently remove any loosening tissue or debris and pat dry with non-sterile gauze.     Primary Dressing: Apply Sorbact Swab 2.5\"x3.5\" over entire area of ulceration.     Secondary dressing: Cover with baby diapers with leg gussets removed.    Change daily.    Secure with Coban.    Activity:  -Sleep in bed at night.  -Twice daily (1 hour between normal meal times) lay flat on couch or bed to reduce edema. Re-tighten compression before getting up.  -Perform ankle circles and pumps while lying flat to bring fluid up out of legs.  -Walk as much as tolerated, this " is good for edema.    Diet:  -Avoid salt and sugar as this retains water and will worsen edema.    Bathing:  -You may shower with waterproof dressing cover.  -Do not soak ulcers.  -Do not leave open to air.     SEEK MEDICAL CARE IF:    You have an increase in swelling, pain, or redness around the wound.    You have an increase in the amount of pus coming from the wound.    There is a bad smell coming from the wound.    The wound appears to be worsening/enlarging    You have a fever greater than 101.5 F      It is ok to continue current wound care treatment/products for the next 2-3 days until new wound care supplies are ordered and arrive. If longer than this please contact our office at [phone].    Maria G Yeboah, MSN, CNP, CWOCN-AP  [facility]  [phone]

## 2021-08-13 NOTE — PROCEDURES
"PICC Line Insertion Procedure Note  REWIRED    Pt. Name: Elizabeth Kelley  MRN:        [id number]    Procedure: Insertion of a  SINGLE Lumen  4 fr  Bard SOLO (valved) Power PICC, Lot number [id number]    Indications: Antibiotic    Contraindications : None    Procedure Details   Patient identified with 2 identifiers and \"Time Out\" conducted.  .     Central line insertion bundle followed: hand hygiene performed prior to procedure, site cleansed with cholraprep, hat, mask, sterile gloves,sterile gown worn, patient draped with maximum barrier head to toe drape, sterile field maintained.    The vein was assessed and found to be compressible and of adequate size. 1.5 ml 1% Lidocaine administered sq to the insertion site. A 4 Fr PICC was advanced into the BRACHIAL vein of the right arm OTW exchange. Catheter threaded without difficulty. Good blood return noted.    Modified Seldinger Technique used for insertion.    The 8 sharps that are included in the PICC insertion kit were accounted for and disposed of in the sharps container prior to breakdown of the sterile field.    Catheter secured with Statlock, biopatch and Tegaderm dressing applied.    Findings:  Total catheter length  40 cm, with 1 cm exposed. Mid upper arm circumference is 40 cm. Catheter was flushed with 30 cc NS. Patient  tolerated procedure well.    Tip placement verified in the distal SVC by [employer]:        CLABSI prevention brochure left at bedside.    Patient's primary RN notified PICC is ready for use.    Comments:          Mazin Ji, RN, MSN, RCIS, VA-BC  Vascular Access - [facility]        "

## 2021-08-13 NOTE — PROGRESS NOTES
This is a recent snapshot of the patient's [facility] medical record.  For current drug dose and complete information and questions, call [phone] or In Basket pool, fv home infusion ([id number])  CSN Number:  [id number]

## 2021-08-16 ENCOUNTER — OFFICE VISIT (OUTPATIENT)
Dept: INFECTIOUS DISEASES | Facility: CLINIC | Age: 76
End: 2021-08-16
Payer: COMMERCIAL

## 2021-08-16 VITALS
WEIGHT: 250 LBS | TEMPERATURE: 98.2 F | HEART RATE: 78 BPM | DIASTOLIC BLOOD PRESSURE: 74 MMHG | BODY MASS INDEX: 47.24 KG/M2 | SYSTOLIC BLOOD PRESSURE: 130 MMHG

## 2021-08-16 DIAGNOSIS — L03.115 CELLULITIS OF RIGHT LOWER EXTREMITY: Primary | ICD-10-CM

## 2021-08-16 PROCEDURE — 99215 OFFICE O/P EST HI 40 MIN: CPT | Performed by: INTERNAL MEDICINE

## 2021-08-16 RX ORDER — MEROPENEM 1 G/1
1 INJECTION, POWDER, FOR SOLUTION INTRAVENOUS EVERY 8 HOURS
COMMUNITY
End: 2021-08-16

## 2021-08-16 RX ORDER — MEROPENEM 1 G/1
1 INJECTION, POWDER, FOR SOLUTION INTRAVENOUS EVERY 8 HOURS
Qty: 30 EACH | Refills: 0
Start: 2021-08-16 | End: 2021-08-26

## 2021-08-16 RX ORDER — GENTAMICIN SULFATE 1 MG/G
OINTMENT TOPICAL 3 TIMES DAILY
Qty: 30 G | Refills: 1 | Status: SHIPPED | OUTPATIENT
Start: 2021-08-16 | End: 2021-08-30

## 2021-08-16 RX ORDER — CLINDAMYCIN HCL 300 MG
300 CAPSULE ORAL 4 TIMES DAILY
Qty: 40 CAPSULE | Refills: 0 | Status: SHIPPED | OUTPATIENT
Start: 2021-08-16 | End: 2021-08-26

## 2021-08-16 NOTE — PATIENT INSTRUCTIONS
Ramon Johnson,  Thank you for taking the time to see us today. We will continue the Meropenem for additional 10 days, Clindamycin and topical gentamicin.   Stop Doxycycline.   Follow up with Wound care.  Lymphedema clinic.   Follow up with ID in 3 weeks    Kathe Vargas MD  [facility]  [phone]

## 2021-08-16 NOTE — Clinical Note
Shey, please call the patient. We need to stop Augmentin and start Clindamycin (as MRSA in the past). So her regimen will be Meropenem, Clindamycin and topical Gentamicin. We stopped Augmentin and Doxycycline. Thank you. Please call and update the patient.

## 2021-08-16 NOTE — PROGRESS NOTES
This is a recent snapshot of the patient's [facility] medical record.  For current drug dose and complete information and questions, call [phone] or In Basket pool, fv home infusion ([id number])  CSN Number:  [id number]

## 2021-08-16 NOTE — PROGRESS NOTES
[facility] INFECTIOUS DISEASE CLINIC    Infectious Disease   Progress Note       Patient was hospitalized at [facility] and discharged home. Patient was seen by Dr. Rodriguez and Dr. Snyder and Dr. Parham in the hospital ()  and seen by me on 8/2/2021 in clinic (new to me in clinic on 8/2)      Assessment & Plan   Cellulitis right lower extremity   Weeping ulcers- see photos below, and Pseudomonas in wound from 8/2/2021  Lymphedema  Previous culture with MRSA Pseudomonas Enterococcus and Proteus  She has been on doxycycline and Augmentin, has some improvement but still has ongoing large wound with slough  Currently on Meropenem, Doxycycline and Augmentin               PLAN     We will continue the Meropenem for additional 10 days, add Clindamycin and topical gentamicin.   Stop Doxycycline and Augmentin and monitor.   Follow up with Wound care.  If wounds worsen, will need to add MRSA coverage back.  Continue to monitor CBC, CMP.  Inflammatory markers improved  Lymphedema clinic.   Follow up with ID in 2-3 weeks, earlier if needed.     Kathe Vargas MD  [facility]  [phone]    ____________________________________________________    Interval History     Still has intermittent drainage, large wounds  Some improvement as compared to 8/2/2021 8/2: Wound is improving though still continues to have  Edema  Has been taking Augmentin and doxycycline  Previous note:  Patient was seen by Dr. Rodriguez in the hospital: Patient was also seen by Dr. Parham and Dr. Penaloza.  No events overnight. Pain better controlled. Tolerating antibiotics. Plans to continue wound clinic after discharge    Past Medical History:   Diagnosis Date     Ankle contracture, left      Class 3 severe obesity due to excess calories without serious comorbidity with body mass index (BMI) of 45.0 to 49.9 in adult (H)      Combined gastric and duodenal ulcer      Depression      Dyslipidemia       Edema      Gait abnormality      GERD (gastroesophageal reflux disease)      Lymphedema of both lower extremities      Melanoma (H)     left upper arm     MS (multiple sclerosis) (H)      RLS (restless legs syndrome)      Skin ulcer of left lower leg (H)      Valgus deformity of both feet      Vitamin D deficiency      Patient Active Problem List   Diagnosis     Venous hypertension of lower extremity, bilateral     Acquired lymphedema of leg     Scar condition and fibrosis of skin     Ankle contracture, left     Morbid obesity with BMI of 50.0-59.9, adult (H)     Valgus deformity of both feet     Flat feet, bilateral     Gait abnormality     MS (multiple sclerosis) (H)     Depression     Vitamin D deficiency     GERD (gastroesophageal reflux disease)     Essential hypertension     History of malignant melanoma of skin     Edema     Major depression, single episode, in complete remission (H)     Menopausal and postmenopausal disorder     Mixed hyperlipidemia     Morbid obesity (H)     Peripheral venous insufficiency     Postmenopausal     Restless legs     Cellulitis of right lower extremity        Social Connections:      Frequency of Communication with Friends and Family:      Frequency of Social Gatherings with Friends and Family:      Attends [employer] Services:      Active Member of Clubs or Organizations:      Attends Club or Organization Meetings:      Marital Status:      I have reviewed this patient's family history and updated it with pertinent information if needed.  Family History   Problem Relation Age of Onset     Uterine Cancer Mother      Hyperlipidemia Mother      Hypertension Mother      Multiple Sclerosis Mother      Asthma Sister      Obesity Sister      Hyperlipidemia Sister      Hypertension Sister      Multiple Sclerosis Sister      Arthritis Sister      Colon Cancer Paternal Aunt      Breast Cancer Paternal Aunt      Hypertension Paternal Aunt      Hyperlipidemia Paternal Aunt      Brain  Cancer Father      Arthritis Maternal Uncle      Arthritis Maternal Grandmother      Early Death Maternal Grandfather      Arthritis Paternal Grandmother      Arthritis Paternal Grandfather        Physical Exam   Vital Signs: Temp: 98.2  F (36.8  C)   BP: 130/74 Pulse: 78            Weight: 250 lbs 0 oz  Gen. NAD  US: PICC  Eyes no conjunctivitis or icterus  Extremities bilat lymphedema, right lower leg is erythematous with weeping wounds, tender  Photo on 8/16/2021                     Neurologic alert oriented no focal deficits    Labs reviewed    CBC RESULTS: Recent Labs   Lab Test 08/10/21  1230   WBC 8.0   RBC 4.46   HGB 11.6*   HCT 37.8   MCV 85   MCH 26.0*   MCHC 30.7*   RDW 14.4        Last Comprehensive Metabolic Panel:  Sodium   Date Value Ref Range Status   08/02/2021 140 136 - 145 mmol/L Final     Potassium   Date Value Ref Range Status   08/02/2021 4.9 3.5 - 5.0 mmol/L Final     Chloride   Date Value Ref Range Status   08/02/2021 103 98 - 107 mmol/L Final     Carbon Dioxide (CO2)   Date Value Ref Range Status   08/02/2021 24 22 - 31 mmol/L Final     Anion Gap   Date Value Ref Range Status   08/02/2021 13 5 - 18 mmol/L Final     Glucose   Date Value Ref Range Status   08/02/2021 83 70 - 125 mg/dL Final     Urea Nitrogen   Date Value Ref Range Status   08/10/2021 18 8 - 28 mg/dL Final     Creatinine   Date Value Ref Range Status   08/10/2021 0.74 0.60 - 1.10 mg/dL Final     GFR Estimate   Date Value Ref Range Status   08/10/2021 80 >60 mL/min/1.73m2 Final     Comment:     As of July 11, 2021, eGFR is calculated by the CKD-EPI creatinine equation, without race adjustment. eGFR can be influenced by muscle mass, exercise, and diet. The reported eGFR is an estimation only and is only applicable if the renal function is stable.   09/26/2020 >60 >60 mL/min/1.73m2 Final     Calcium   Date Value Ref Range Status   08/02/2021 10.0 8.5 - 10.5 mg/dL Final     Bilirubin Total   Date Value Ref Range Status    08/02/2021 0.4 0.0 - 1.0 mg/dL Final     Alkaline Phosphatase   Date Value Ref Range Status   08/02/2021 156 (H) 45 - 120 U/L Final     ALT   Date Value Ref Range Status   08/02/2021 31 0 - 45 U/L Final     AST   Date Value Ref Range Status   08/10/2021 21 0 - 40 U/L Final                 Results for LELO ALVAREZ (MRN [id number]) as of 7/23/2021 12:23   Ref. Range 7/22/2021 07:57   WBC Latest Ref Range: 4.0 - 11.0 10e3/uL 10.0   Hemoglobin Latest Ref Range: 11.7 - 15.7 g/dL 9.6 (L)   Hematocrit Latest Ref Range: 35.0 - 47.0 % 31.5 (L)   Platelet Count Latest Ref Range: 150 - 450 10e3/uL 322   RBC Count Latest Ref Range: 3.80 - 5.20 10e6/uL 3.71 (L)   MCV Latest Ref Range: 78 - 100 fL 85   MCH Latest Ref Range: 26.5 - 33.0 pg 25.9 (L)   MCHC Latest Ref Range: 31.5 - 36.5 g/dL 30.5 (L)   RDW Latest Ref Range: 10.0 - 15.0 % 14.6       Wound Aerobic Bacterial Culture Routine  Order: [id number]  Status:  Final result   Visible to patient:  Yes (MyChart) Dx:  Cellulitis of right lower extremity  Specimen Information: Leg, Below Knee, Right; Wound         0 Result Notes  Culture 4+ Pseudomonas aeruginosaAbnormal           Resulting Agency: [facility]   Susceptibility     Pseudomonas aeruginosa     MARYBEL     Aztreonam >16 ug/mL Resistant     Cefepime 8 ug/mL Susceptible     Ceftazidime >16 ug/mL Resistant     Ciprofloxacin >2 ug/mL Resistant     Gentamicin 8 ug/mL Intermediate     Levofloxacin >4 ug/mL Resistant     Meropenem 4 ug/mL Intermediate     Piperacillin/Tazobactam >64/4 ug/mL Resistant     Tobramycin <=2 ug/mL Susceptible                 Specimen Collected: 08/02/21  3:31 PM Last Resulted: 08/04/21  1:40 PM               Pseudomonas aeruginosa MRSA Coagulase Positive Staph       MARYBEL MARYBEL     Aztreonam 16  Intermediate       Cefazolin   >16  Resistant     Cefepime 8  Susceptible       Ceftazidime 4  Susceptible       Ciprofloxacin 2  Resistant       Clindamycin   <=0.5  Susceptible     Daptomycin   <=1  Susceptible      Doxycycline   2  Susceptible     Gentamicin >8  Resistant       Levofloxacin 4  Resistant       Linezolid   2  Susceptible     Meropenem 2  Susceptible       Oxacillin   >2  Resistant     Piperacillin/Tazobactam 16/4  Susceptible       Tobramycin <=2  Susceptible       Trimeth/Sulfa   <=1/19  Susceptible     Vancomycin   1  Susceptible             Susceptibility     Enterococcus faecalis     MARYBEL     Ampicillin 1  Susceptible          RADIOLOGY:    US Lower Extremity Venous Duplex Bilateral    Result Date: 7/22/2021  EXAM: US LOWER EXTREMITY VENOUS DUPLEX BILATERAL LOCATION: [facility] DATE/TIME: 7/22/2021 3:56 PM INDICATION: edema, cellulitis COMPARISON: None. TECHNIQUE: Venous Duplex ultrasound of bilateral lower extremities with and without compression, augmentation and duplex. Color flow and spectral Doppler with waveform analysis performed. Thickened skin of the right calf with nonvisualization of the peritoneal veins. Edematous/weeping skin of the left calf with known MRSA infection. FINDINGS: Exam includes the common femoral, femoral, popliteal veins as well as segmentally visualized deep calf veins and greater saphenous vein. RIGHT: No deep vein thrombosis. No superficial thrombophlebitis. No popliteal cyst. LEFT: No deep vein thrombosis. No superficial thrombophlebitis. Complex fluid collection medial to the left knee measures 8 mm in thickness and 6.2 cm in length.     IMPRESSION: 1.  Suboptimal assessment of calf veins due to technical limitations as described. No acute DVT in the imaged veins of the bilateral lower extremities. 2.  Mildly complex left popliteal fossa cyst.    Total Time Spent 40 minutes with >50% of the time spent in counseling, education and care coordination, chart review

## 2021-08-17 ENCOUNTER — LAB REQUISITION (OUTPATIENT)
Dept: LAB | Facility: CLINIC | Age: 76
End: 2021-08-17
Payer: COMMERCIAL

## 2021-08-17 ENCOUNTER — HOME INFUSION (PRE-WILLOW HOME INFUSION) (OUTPATIENT)
Dept: PHARMACY | Facility: CLINIC | Age: 76
End: 2021-08-17

## 2021-08-17 DIAGNOSIS — L03.115 CELLULITIS OF RIGHT LOWER LIMB: ICD-10-CM

## 2021-08-17 LAB
BASOPHILS # BLD AUTO: 0.1 10E3/UL (ref 0–0.2)
BASOPHILS NFR BLD AUTO: 1 %
CREAT SERPL-MCNC: 0.76 MG/DL (ref 0.6–1.1)
CRP SERPL HS-MCNC: 27.1 MG/L (ref 0–3)
EOSINOPHIL # BLD AUTO: 0.2 10E3/UL (ref 0–0.7)
EOSINOPHIL NFR BLD AUTO: 2 %
ERYTHROCYTE [DISTWIDTH] IN BLOOD BY AUTOMATED COUNT: 14.3 % (ref 10–15)
GFR SERPL CREATININE-BSD FRML MDRD: 78 ML/MIN/1.73M2
HCT VFR BLD AUTO: 36.7 % (ref 35–47)
HGB BLD-MCNC: 11.3 G/DL (ref 11.7–15.7)
IMM GRANULOCYTES # BLD: 0 10E3/UL
IMM GRANULOCYTES NFR BLD: 0 %
LYMPHOCYTES # BLD AUTO: 1.6 10E3/UL (ref 0.8–5.3)
LYMPHOCYTES NFR BLD AUTO: 17 %
MCH RBC QN AUTO: 25.9 PG (ref 26.5–33)
MCHC RBC AUTO-ENTMCNC: 30.8 G/DL (ref 31.5–36.5)
MCV RBC AUTO: 84 FL (ref 78–100)
MONOCYTES # BLD AUTO: 0.8 10E3/UL (ref 0–1.3)
MONOCYTES NFR BLD AUTO: 9 %
NEUTROPHILS # BLD AUTO: 6.3 10E3/UL (ref 1.6–8.3)
NEUTROPHILS NFR BLD AUTO: 71 %
NRBC # BLD AUTO: 0 10E3/UL
NRBC BLD AUTO-RTO: 0 /100
PLATELET # BLD AUTO: 320 10E3/UL (ref 150–450)
RBC # BLD AUTO: 4.36 10E6/UL (ref 3.8–5.2)
WBC # BLD AUTO: 9 10E3/UL (ref 4–11)

## 2021-08-17 PROCEDURE — 85025 COMPLETE CBC W/AUTO DIFF WBC: CPT | Mod: ORL | Performed by: INTERNAL MEDICINE

## 2021-08-17 PROCEDURE — 86141 C-REACTIVE PROTEIN HS: CPT | Mod: ORL | Performed by: INTERNAL MEDICINE

## 2021-08-17 PROCEDURE — 82565 ASSAY OF CREATININE: CPT | Mod: ORL | Performed by: INTERNAL MEDICINE

## 2021-08-17 NOTE — PROGRESS NOTES
This is a recent snapshot of the patient's [facility] medical record.  For current drug dose and complete information and questions, call [phone] or In Basket pool, fv home infusion ([id number])  CSN Number:  [id number]

## 2021-08-17 NOTE — PROGRESS NOTES
This is a recent snapshot of the patient's [facility] medical record.  For current drug dose and complete information and questions, call [phone] or In Basket pool, fv home infusion ([id number])  CSN Number:  [id number]

## 2021-08-18 ENCOUNTER — HOSPITAL ENCOUNTER (OUTPATIENT)
Dept: PHYSICAL THERAPY | Facility: REHABILITATION | Age: 76
End: 2021-08-18
Payer: COMMERCIAL

## 2021-08-18 ENCOUNTER — HOME INFUSION (PRE-WILLOW HOME INFUSION) (OUTPATIENT)
Dept: PHARMACY | Facility: CLINIC | Age: 76
End: 2021-08-18

## 2021-08-18 ENCOUNTER — TELEPHONE (OUTPATIENT)
Dept: INFECTIOUS DISEASES | Facility: CLINIC | Age: 76
End: 2021-08-18

## 2021-08-18 DIAGNOSIS — I83.018 VENOUS STASIS ULCER OF OTHER PART OF RIGHT LOWER LEG LIMITED TO BREAKDOWN OF SKIN WITH VARICOSE VEINS (H): ICD-10-CM

## 2021-08-18 DIAGNOSIS — R22.43 LOCALIZED SWELLING OF BOTH LOWER LEGS: ICD-10-CM

## 2021-08-18 DIAGNOSIS — I89.0 LYMPHEDEMA: Primary | ICD-10-CM

## 2021-08-18 DIAGNOSIS — L97.811 VENOUS STASIS ULCER OF OTHER PART OF RIGHT LOWER LEG LIMITED TO BREAKDOWN OF SKIN WITH VARICOSE VEINS (H): ICD-10-CM

## 2021-08-18 PROCEDURE — 97140 MANUAL THERAPY 1/> REGIONS: CPT | Mod: GP | Performed by: PHYSICAL THERAPIST

## 2021-08-18 PROCEDURE — 97535 SELF CARE MNGMENT TRAINING: CPT | Mod: GP | Performed by: PHYSICAL THERAPIST

## 2021-08-18 NOTE — TELEPHONE ENCOUNTER
Per Dr Vargas to discontinue gentamycin and augmentin. Can inquire wound clinic on any topical creams/ointments. Pt understands

## 2021-08-18 NOTE — DISCHARGE INSTRUCTIONS
Continue with lymph home exercise routine 1-2 times per day and try to keep leg elevated or propped up when able

## 2021-08-18 NOTE — TELEPHONE ENCOUNTER
We need to stop Augmentin and start Clindamycin (as MRSA in the past). So her regimen will be Meropenem, Clindamycin and topical Gentamicin. We stopped Augmentin and Doxycycline. Thank you. Please call and update the patient.       Called pt to discuss above, per Dr Vargas. Pt understands and will discontinue Augmentin as well as doxycycline. Will  clindamycin today. States when she applies gentamycin her skin burns, advise on if that is normal.    Sent text page to Dr Vargas

## 2021-08-19 NOTE — PROGRESS NOTES
Progress note for 7/20/21 to 8/18/21 08/18/21 1100   Signing Clinician's Name / Credentials   Signing clinician's name / credentials Bee Olguin PT, DPT, OCS, CLT   Session Number   Session Number 17   Additional Session Number of 20   Session Tracking, Supervision and Quick Adds   PT Assistant Visit Number 12   Progress Note/Recertification   Progress Note Due Date 08/19/21   Progress Note Completed Date 08/18/21   Recertification Due Date 09/14/21   Goal 1   Goal identifier Patient will have a decreased volume in : right;LE;by 5%;to decrease risk of infection;for better fit of clothing;for improved body image;for ease of movement;in 12 weeks   Goal 2   Goal identifier Patient will have a decreased volume in : right;LE;by 5%;to decrease risk of infection;for better fit of clothing;for improved body image;for ease of movement;in 12 weeks   Goal 3   Goal identifier Patient will perform, verbalize self-management of: skin care;self-monitoring;exercise;massage;self-compression;compression wear;compression care;infection prevention;in 12 weeks   Goal description IMPROVING - pt compliant with HEP and compression wrapping and wound dressing changes 2x/day   Goal 4   Goal identifier Pt will: demo decreased wound size by 50% for improvement of skin quality and decreased risk for infections in 12 weeks.   Goal description IMPROVING - had worsened with infection but now improving again   Subjective Report   Subjective Report Pt reports she went into the hospital the day after her last visit on 7/21/21 and ended up being there for 6 days. Pt reports the lateral side of her leg and anterior superior shin is mostly healed but anterior lower shin is still raw. Pt had a PIC line put in and she is self-injecting 3x/day anti-biotics and she will continue this for the next 1.5 weeks. Pt is having blood draws 2x/week to watch for levels with infection. Pt is going to the wound clinic every 2 weeks for cleansing of the skin and  wound and gently debrided dead skin. Pain rating today at 5/10 - painful where skin is healing.   Objective Measures   Objective Measures Objective Measure 1   Objective Measure 1   Objective Measure Volume measurements   Details R LE volumes 13255.13 today -was 36467 cm3 on 6/16/21 prior to latest infection   Treatment Interventions   Interventions Manual Therapy;Self Care/Home Management   Manual Therapy   Manual Therapy: Mobilization, MFR, MLD, friction massage minutes ([id number]) 14   Skilled Intervention Reassessed R LE volumes and discussed results compared to 6/16/21   Self Care/home Management   ADL/Home Mgmt Training ([id number]) 12   Skilled Intervention Discussed pt's recent infection and hospitalization. Pt reports that she is starting a new anti-biotic tomorrow due to another bacteria noted with her infection so held on performing MLD today and discussed home program just her lymph home exercises and maintaining 2x/day wound dressing and compression bandaging until she is done with anti-biotics.   Assessments Completed   Assessments Completed Despite recent serious infection with hospitalization x6 days pt's R LE is only ~100 cm3 larger than 6 weeks ago and wound and skin quality are again improving.   Education   Learner Patient   Readiness Eager   Method Booklet/handout   Response Verbalizes Understanding   Plan   Home program Pt will continue wiht her LE lymph ex's    Plan for next session Assess response to anti-biotics. When finished pt to return to compression pump at home and return for reassessment B LE volumes and MLD as needed.   Total Session Time   Timed Code Treatment Minutes 26   Total Treatment Time (sum of timed and untimed services) 26

## 2021-08-20 NOTE — PROGRESS NOTES
This is a recent snapshot of the patient's [facility] medical record.  For current drug dose and complete information and questions, call [phone] or In Basket pool, fv home infusion ([id number])  CSN Number:  [id number]

## 2021-08-23 ENCOUNTER — TELEPHONE (OUTPATIENT)
Dept: INFECTIOUS DISEASES | Facility: CLINIC | Age: 76
End: 2021-08-23

## 2021-08-23 NOTE — TELEPHONE ENCOUNTER
Dr Vargas & team    Patient wondering if we received and worked on there Short Term Disability paperwork?    It is due at the end of this week.    Please call her back for confirmation @ [phone]

## 2021-08-23 NOTE — TELEPHONE ENCOUNTER
Spoke to Jaleesa Gomez MA and she has the paperwork and will have Maria G Yeboah CNP sign tomorrow 8/24.    Called Elizabeth and discussed this with her. She will need to be completely off work until at least September 6th.

## 2021-08-23 NOTE — TELEPHONE ENCOUNTER
Patient calling to verify whether or not disability paperwork has been filled out and sent.   Ph: [phone]

## 2021-08-23 NOTE — TELEPHONE ENCOUNTER
Called pt and informed forms were passed to Wound care clinic Maria G Yeboah. Pt understands and will be seeing them this week Friday.    Pt states leg is in pain, unsure why. Reports fluff has improved and legs are drying.

## 2021-08-24 ENCOUNTER — LAB REQUISITION (OUTPATIENT)
Dept: LAB | Facility: CLINIC | Age: 76
End: 2021-08-24
Payer: COMMERCIAL

## 2021-08-24 ENCOUNTER — HOME INFUSION (PRE-WILLOW HOME INFUSION) (OUTPATIENT)
Dept: PHARMACY | Facility: CLINIC | Age: 76
End: 2021-08-24

## 2021-08-24 DIAGNOSIS — L03.115 CELLULITIS OF RIGHT LOWER LIMB: ICD-10-CM

## 2021-08-24 LAB
AST SERPL W P-5'-P-CCNC: 19 U/L (ref 0–40)
BASOPHILS # BLD AUTO: 0.1 10E3/UL (ref 0–0.2)
BASOPHILS NFR BLD AUTO: 1 %
C REACTIVE PROTEIN LHE: 3.2 MG/DL (ref 0–0.8)
CREAT SERPL-MCNC: 0.73 MG/DL (ref 0.6–1.1)
EOSINOPHIL # BLD AUTO: 0.3 10E3/UL (ref 0–0.7)
EOSINOPHIL NFR BLD AUTO: 3 %
ERYTHROCYTE [DISTWIDTH] IN BLOOD BY AUTOMATED COUNT: 14.3 % (ref 10–15)
GFR SERPL CREATININE-BSD FRML MDRD: 81 ML/MIN/1.73M2
HCT VFR BLD AUTO: 33.7 % (ref 35–47)
HGB BLD-MCNC: 10.6 G/DL (ref 11.7–15.7)
IMM GRANULOCYTES # BLD: 0 10E3/UL
IMM GRANULOCYTES NFR BLD: 0 %
LYMPHOCYTES # BLD AUTO: 1.5 10E3/UL (ref 0.8–5.3)
LYMPHOCYTES NFR BLD AUTO: 16 %
MCH RBC QN AUTO: 26.3 PG (ref 26.5–33)
MCHC RBC AUTO-ENTMCNC: 31.5 G/DL (ref 31.5–36.5)
MCV RBC AUTO: 84 FL (ref 78–100)
MONOCYTES # BLD AUTO: 0.8 10E3/UL (ref 0–1.3)
MONOCYTES NFR BLD AUTO: 9 %
NEUTROPHILS # BLD AUTO: 6.7 10E3/UL (ref 1.6–8.3)
NEUTROPHILS NFR BLD AUTO: 71 %
NRBC # BLD AUTO: 0 10E3/UL
NRBC BLD AUTO-RTO: 0 /100
PLATELET # BLD AUTO: 299 10E3/UL (ref 150–450)
RBC # BLD AUTO: 4.03 10E6/UL (ref 3.8–5.2)
WBC # BLD AUTO: 9.4 10E3/UL (ref 4–11)

## 2021-08-24 PROCEDURE — 84450 TRANSFERASE (AST) (SGOT): CPT | Mod: ORL | Performed by: INTERNAL MEDICINE

## 2021-08-24 PROCEDURE — 36592 COLLECT BLOOD FROM PICC: CPT | Mod: ORL | Performed by: INTERNAL MEDICINE

## 2021-08-24 PROCEDURE — 86141 C-REACTIVE PROTEIN HS: CPT | Mod: ORL | Performed by: INTERNAL MEDICINE

## 2021-08-24 PROCEDURE — 82565 ASSAY OF CREATININE: CPT | Mod: ORL | Performed by: INTERNAL MEDICINE

## 2021-08-24 PROCEDURE — 85025 COMPLETE CBC W/AUTO DIFF WBC: CPT | Mod: ORL | Performed by: INTERNAL MEDICINE

## 2021-08-24 NOTE — TELEPHONE ENCOUNTER
Paperwork was faxed to [facility] [employer] [place] fax: [phone] and the paperwork was sent to scanning.

## 2021-08-25 ENCOUNTER — HOME INFUSION (PRE-WILLOW HOME INFUSION) (OUTPATIENT)
Dept: PHARMACY | Facility: CLINIC | Age: 76
End: 2021-08-25

## 2021-08-25 ENCOUNTER — TELEPHONE (OUTPATIENT)
Dept: VASCULAR SURGERY | Facility: CLINIC | Age: 76
End: 2021-08-25

## 2021-08-25 NOTE — TELEPHONE ENCOUNTER
"Pt called to inform the team the pain in her right leg has \"intensified\" and would like to know if there are any findings related to this from the lab test that was completed yesterday. Please call pt back to discuss.  "

## 2021-08-25 NOTE — PROGRESS NOTES
This is a recent snapshot of the patient's [facility] medical record.  For current drug dose and complete information and questions, call [phone] or In Basket pool, fv home infusion ([id number])  CSN Number:  [id number]

## 2021-08-26 ENCOUNTER — LAB REQUISITION (OUTPATIENT)
Dept: LAB | Facility: CLINIC | Age: 76
End: 2021-08-26
Payer: COMMERCIAL

## 2021-08-26 ENCOUNTER — HOME INFUSION (PRE-WILLOW HOME INFUSION) (OUTPATIENT)
Dept: PHARMACY | Facility: CLINIC | Age: 76
End: 2021-08-26

## 2021-08-26 LAB — ERYTHROCYTE [SEDIMENTATION RATE] IN BLOOD BY WESTERGREN METHOD: 66 MM/HR (ref 0–20)

## 2021-08-26 PROCEDURE — 85652 RBC SED RATE AUTOMATED: CPT | Mod: ORL | Performed by: INTERNAL MEDICINE

## 2021-08-26 NOTE — PROGRESS NOTES
This is a recent snapshot of the patient's [facility] medical record.  For current drug dose and complete information and questions, call [phone] or In Basket pool, fv home infusion ([id number])  CSN Number:  [id number]

## 2021-08-26 NOTE — TELEPHONE ENCOUNTER
Per Dr Vargas  Patient needs to be evaluated in ER if pain has intensified, wound is worse, not improving.    Called pt. She states the pain only occurs at night around 4am when she wakes up for a restroom break that lasts until she changes the dressing late morning. Reason for pt calling. Asked if she can take CBD for pain? Informed will ask ID provider or she will also ask wound care at her visit tomorrow.

## 2021-08-26 NOTE — PROGRESS NOTES
This is a recent snapshot of the patient's [facility] medical record.  For current drug dose and complete information and questions, call [phone] or In Basket pool, fv home infusion ([id number])  CSN Number:  [id number]

## 2021-08-27 ENCOUNTER — TELEPHONE (OUTPATIENT)
Dept: INFECTIOUS DISEASES | Facility: CLINIC | Age: 76
End: 2021-08-27

## 2021-08-27 ENCOUNTER — HOME INFUSION (PRE-WILLOW HOME INFUSION) (OUTPATIENT)
Dept: PHARMACY | Facility: CLINIC | Age: 76
End: 2021-08-27

## 2021-08-27 ENCOUNTER — OFFICE VISIT (OUTPATIENT)
Dept: VASCULAR SURGERY | Facility: CLINIC | Age: 76
End: 2021-08-27
Attending: NURSE PRACTITIONER
Payer: COMMERCIAL

## 2021-08-27 VITALS — SYSTOLIC BLOOD PRESSURE: 112 MMHG | HEART RATE: 84 BPM | DIASTOLIC BLOOD PRESSURE: 68 MMHG | TEMPERATURE: 98.3 F

## 2021-08-27 DIAGNOSIS — L97.912 CHRONIC ULCER OF RIGHT LEG, WITH FAT LAYER EXPOSED (H): Primary | ICD-10-CM

## 2021-08-27 DIAGNOSIS — I89.0 ACQUIRED LYMPHEDEMA OF LEG: ICD-10-CM

## 2021-08-27 DIAGNOSIS — L90.5 SCAR CONDITION AND FIBROSIS OF SKIN: ICD-10-CM

## 2021-08-27 DIAGNOSIS — L08.9 RECURRENT INFECTION OF SKIN: ICD-10-CM

## 2021-08-27 DIAGNOSIS — E66.01 MORBID OBESITY (H): ICD-10-CM

## 2021-08-27 PROCEDURE — G0463 HOSPITAL OUTPT CLINIC VISIT: HCPCS

## 2021-08-27 PROCEDURE — 99213 OFFICE O/P EST LOW 20 MIN: CPT | Performed by: NURSE PRACTITIONER

## 2021-08-27 ASSESSMENT — PAIN SCALES - GENERAL: PAINLEVEL: SEVERE PAIN (7)

## 2021-08-27 NOTE — PROGRESS NOTES
This is a recent snapshot of the patient's [facility] medical record.  For current drug dose and complete information and questions, call [phone] or In Basket pool, fv home infusion ([id number])  CSN Number:  [id number]

## 2021-08-27 NOTE — PROGRESS NOTES
[facility] VASCULAR - [place]     FOLLOW UP NOTE      Date of Service: 7/30/2021    Date Last Seen: 7/9/2021; 7/21/2021    Chief Complaint: Right lower leg ulcer    Pt returns to [facility] with regards to the above listed concern(s).  Pertinent medical history includes multiple sclerosis, obesity, acquired lymphedema following lymphectomies, and venous insufficiency s/p Right Lower Extremity interventions. She has a history of recurrent lower limb cellulitis and  Left Lower Extremity ulcers which have healed. She also has an altered gait secondary to bilateral valgus deformity, left ankle contracture and bilateral flat feel in conjunction with multiple sclerosis.    The patient was seen urgently on Monday 7/05/2021, where concern of infection was noted. Tissue was debrided and a swab culture obtained via Nugent method. Preliminary results showed 1+ gram positive cocci in pairs and no polymorphonuclear leukocytes. Patient started on levofloxacin (Levaquin) and clindamycin added upon final sensitivity results. Pain did not improve with outpatient therapy and the patient was hospitalized for leg cellulitis (7/21-7/27/2021); treated with Zosyn and vancomycin and discharged on Amoxicillin/clavulanic acid (Augmentin) and doxycycline (Monodox, Vibramycin) per ID. She has since seen ID and Amoxicillin/clavulanic acid (Augmentin) and doxycycline (Monodox, Vibramycin) have been extended with the addition of meropenem.     Subjective:  Today the patient reports feeling well; Denies fevers, chills. No shortness of breath. Pain is rated 7 out of 10 and localized to the wound/ulcer with palpation; she also notes pain in the early morning. Patient is applying Sorbact Swab and diapers to her leg. Compression applied with tubular compression. Pumps on hold per ID.       Allergies: Other environmental allergy, Adhesive tape, Adhesive [mecrylate], and Vicodin  [hydrocodone-acetaminophen]      Medications:   Current Outpatient Medications:      aspirin 81 mg chewable tablet, Take 81 mg by mouth every evening , Disp: , Rfl:      buPROPion (WELLBUTRIN SR) 150 MG 12 hr tablet, Take 150 mg by mouth every morning , Disp: , Rfl:      cholecalciferol, vitamin D3, 1,000 unit tablet, [CHOLECALCIFEROL, VITAMIN D3, 1,000 UNIT TABLET] Take 2,000 Units by mouth daily., Disp: , Rfl:      citalopram (CELEXA) 40 MG tablet, Take 40 mg by mouth every morning , Disp: , Rfl:      furosemide (LASIX) 20 MG tablet, Take 20 mg by mouth 2 times daily AM and afternoon (4PM), Disp: , Rfl:      gentamicin (GARAMYCIN) 0.1 % external ointment, Apply topically 3 times daily for 14 days Apply on left leg, Disp: 30 g, Rfl: 1     multivitamin therapeutic (THERAGRAN) tablet, [MULTIVITAMIN THERAPEUTIC (THERAGRAN) TABLET] Take 1 tablet by mouth daily., Disp: , Rfl:      multivitamin w/minerals (CENTRUM ADULTS) tablet, as directed, Disp: , Rfl:      naproxen sodium (ALEVE) 220 MG tablet, Take 440 mg by mouth daily as needed , Disp: , Rfl:      oxyCODONE (ROXICODONE) 5 MG tablet, Take 1 tablet (5 mg) by mouth every 8 hours as needed for breakthrough pain or severe pain, Disp: 9 tablet, Rfl: 0     pantoprazole (PROTONIX) 40 MG tablet, [PANTOPRAZOLE (PROTONIX) 40 MG TABLET] Take 40 mg by mouth daily., Disp: , Rfl:      potassium chloride ER (KLOR-CON M) 20 MEQ CR tablet, Take 20 mEq by mouth every evening, Disp: , Rfl:      potassium chloride SA (K-DUR,KLOR-CON) 20 MEQ tablet, Take 40 mEq by mouth every morning , Disp: , Rfl:      rOPINIRole (REQUIP) 1 MG tablet, Take 1 mg by mouth At Bedtime, Disp: , Rfl:      rOPINIRole (REQUIP) 1 MG tablet, Take 0.5 mg by mouth 2 times daily AM and afternoon, Disp: , Rfl:      simvastatin (ZOCOR) 40 MG tablet, [SIMVASTATIN (ZOCOR) 40 MG TABLET] Take 40 mg by mouth bedtime., Disp: , Rfl:      spironolactone (ALDACTONE) 25 MG tablet, [SPIRONOLACTONE (ALDACTONE) 25 MG TABLET]  Take 25 mg by mouth daily., Disp: , Rfl:       History:   Past Medical History:   Diagnosis Date     Ankle contracture, left      Class 3 severe obesity due to excess calories without serious comorbidity with body mass index (BMI) of 45.0 to 49.9 in adult (H)      Combined gastric and duodenal ulcer      Depression      Dyslipidemia      Edema      Gait abnormality      GERD (gastroesophageal reflux disease)      Lymphedema of both lower extremities      Melanoma (H)     left upper arm     MS (multiple sclerosis) (H)      RLS (restless legs syndrome)      Skin ulcer of left lower leg (H)      Valgus deformity of both feet      Vitamin D deficiency          Physical Exam:    There were no vitals taken for this visit.  Weight is 0 lbs 0 oz          There is no height or weight on file to calculate BMI.    General:  Patient presents to clinic in no apparent distress.  Head: normocephalic atraumatic  Psychiatric:  Alert and oriented x3.   Respiratory: unlabored breathing; no cough  Integumentary:  Skin is uniformly warm, dry and pink.    Peripheral Vascular:  Stable, non pitting edema of the right lower leg with lipodermatosclerosis and significant scarring and fibrosis. No edema of the toes or foot, right.     Circumferential volume measures:       Ulceration(s)/Wound(s):   Wound/Ulcer location: RIGHT LOWER LEG   Size: 15.6 cm L x 25.0 cm W x 0.2 cm D   Edges: Attached with irregular borders; epithelial islands present within the ulcer base.  Undermining: none  Base: pink with yellow fibrin and scattered areas of desiccated adipose tissue.   Tissues: dermis  Thickness: partial  Exudate Type: serous  Exudate Amount: copious  Michelle-Wound/Ulcer: no warmth or erythema  Odor: none      Laboratory/Diagnostics studes:    No components found for: SEDRATE  No results found for: CREATININE  No results found for: HGBA1C  Lab Results   Component Value Date    BUN 18 08/10/2021     Lab Results   Component Value Date    ALBUMIN 3.4  (L) 08/02/2021     No components found for: [id number]      Diagnoses:  1. Chronic ulcer of right leg, with fat layer exposed (H)    2. Acquired lymphedema of leg    3. Recurrent infection of skin    4. Scar condition and fibrosis of skin    5. Morbid obesity (H)         Photo:  8/27/2021  Right lower leg        Are any of these wounds new today: No; Location: NA.      Assessment/Plan:  1. Procedure: NA.     2. Treatment: wound treatment will include irrigation and dressings to promote autolytic debridement which will include: primary Sorbact Swab and secondary dressing of baby diapers with leg gussets removed, daily.     3. Edema: tubular and Coban compression. Lymphedema pumps on hold.     4. Offloading: NA    5. Nutrition: NA    6. Diagnostics: NA    7. Medications: Recommended OTC lidocaine patch applied near the margin of the ulcer per 's instructions.     8. Referrals: NA    9. Education: See above. Education provided regarding the above plan of care, expected changes in the wound/ulcer base and signs/symptoms of concern, as well as ordered labs and diagnostics. Patient verbalizes understanding and all questions answered to their satisfaction.        Impression:  Elizabeth Kelley is a 76 year old patient with extensive ulceration of the entire right lower leg complicated by recurrent infection, acquired lymphedema following lymphectomies, and venous insufficiency s/p Right Lower Extremity interventions. She has a history of Left Lower Extremity ulcers which have healed. She also has an altered gait secondary to bilateral valgus deformity, left ankle contracture and bilateral flat feel in conjunction with multiple sclerosis. She continues to be ambulatory without assistive device. The patient has started lymphedema therapy to redirect lymph fluid back into the circulatory system.     Today, the Right Lower Extremity ulcer is limited to skin breakdown. Size is smaller with new epithelial islands  that are not accounted for in the measurements; there are no signs of infection. Will continue plan of care with antimicrobial layer that allows lymphatic drainage to pass through to super-absorbant dressing. Patient will apply mild compression with tubular and Coban compression.       Follow Up:  Patient will follow up with me in 2 weeks for reevaluation. They were instructed to call the clinic sooner with any signs or symptoms of infection or any further questions/concerns. Answered all questions.      Maria G Yeboah, MSN, CNP, CWOCN-AP  [facility]  [phone]

## 2021-08-27 NOTE — PATIENT INSTRUCTIONS
"Wound Care Instructions    Right lower leg:  Cleanse with wound wash or bottled water. Use non-sterile 4x4 gauze to gently remove any loosening tissue or debris and pat dry with non-sterile gauze.     Primary Dressing: Apply Sorbact Swab 2.5\"x3.5\" over entire area of ulceration.     Secondary dressing: Cover with baby diapers with leg gussets removed.    Change daily.    Secure with Coban.    Medications:  -Try lidocaine patches around ulcer at night to help with pain relief and sleep.  -oral CBD is not contra indicated in wound healing.     Activity:  -Sleep in bed at night.  -Twice daily (1 hour between normal meal times) lay flat on couch or bed to reduce edema.   -Perform ankle circles and pumps while lying flat to bring fluid up out of legs.  -Walk as much as tolerated, this is good for edema.    Diet:  -Avoid salt and sugar as this retains water and will worsen edema.    Bathing:  -You may shower with waterproof dressing cover.  -Do not soak ulcers.  -Do not leave open to air.     SEEK MEDICAL CARE IF:    You have an increase in swelling, pain, or redness around the wound.    You have an increase in the amount of pus coming from the wound.    There is a bad smell coming from the wound.    The wound appears to be worsening/enlarging    You have a fever greater than 101.5 F      It is ok to continue current wound care treatment/products for the next 2-3 days until new wound care supplies are ordered and arrive. If longer than this please contact our office at [phone].    Maria G Yeboah, MSN, CNP, CWOCN-AP  [facility]  [phone]   "

## 2021-08-30 ENCOUNTER — HOME INFUSION (PRE-WILLOW HOME INFUSION) (OUTPATIENT)
Dept: PHARMACY | Facility: CLINIC | Age: 76
End: 2021-08-30

## 2021-08-31 ENCOUNTER — LAB REQUISITION (OUTPATIENT)
Dept: LAB | Facility: CLINIC | Age: 76
End: 2021-08-31
Payer: COMMERCIAL

## 2021-08-31 ENCOUNTER — HOME INFUSION (PRE-WILLOW HOME INFUSION) (OUTPATIENT)
Dept: PHARMACY | Facility: CLINIC | Age: 76
End: 2021-08-31

## 2021-08-31 LAB
AST SERPL W P-5'-P-CCNC: 17 U/L (ref 0–40)
BASOPHILS # BLD AUTO: 0.1 10E3/UL (ref 0–0.2)
BASOPHILS NFR BLD AUTO: 1 %
BUN SERPL-MCNC: 23 MG/DL (ref 8–28)
C REACTIVE PROTEIN LHE: 4.1 MG/DL (ref 0–0.8)
CREAT SERPL-MCNC: 0.82 MG/DL (ref 0.6–1.1)
EOSINOPHIL # BLD AUTO: 0.3 10E3/UL (ref 0–0.7)
EOSINOPHIL NFR BLD AUTO: 3 %
ERYTHROCYTE [DISTWIDTH] IN BLOOD BY AUTOMATED COUNT: 14.5 % (ref 10–15)
ERYTHROCYTE [SEDIMENTATION RATE] IN BLOOD BY WESTERGREN METHOD: 68 MM/HR (ref 0–20)
GFR SERPL CREATININE-BSD FRML MDRD: 71 ML/MIN/1.73M2
HCT VFR BLD AUTO: 35.3 % (ref 35–47)
HGB BLD-MCNC: 11.2 G/DL (ref 11.7–15.7)
IMM GRANULOCYTES # BLD: 0 10E3/UL
IMM GRANULOCYTES NFR BLD: 0 %
LYMPHOCYTES # BLD AUTO: 1.3 10E3/UL (ref 0.8–5.3)
LYMPHOCYTES NFR BLD AUTO: 14 %
MCH RBC QN AUTO: 26.8 PG (ref 26.5–33)
MCHC RBC AUTO-ENTMCNC: 31.7 G/DL (ref 31.5–36.5)
MCV RBC AUTO: 84 FL (ref 78–100)
MONOCYTES # BLD AUTO: 0.8 10E3/UL (ref 0–1.3)
MONOCYTES NFR BLD AUTO: 9 %
NEUTROPHILS # BLD AUTO: 7 10E3/UL (ref 1.6–8.3)
NEUTROPHILS NFR BLD AUTO: 73 %
NRBC # BLD AUTO: 0 10E3/UL
NRBC BLD AUTO-RTO: 0 /100
PLATELET # BLD AUTO: 336 10E3/UL (ref 150–450)
RBC # BLD AUTO: 4.18 10E6/UL (ref 3.8–5.2)
WBC # BLD AUTO: 9.5 10E3/UL (ref 4–11)

## 2021-08-31 PROCEDURE — 85025 COMPLETE CBC W/AUTO DIFF WBC: CPT | Mod: ORL | Performed by: INTERNAL MEDICINE

## 2021-08-31 PROCEDURE — 36592 COLLECT BLOOD FROM PICC: CPT | Mod: ORL | Performed by: INTERNAL MEDICINE

## 2021-08-31 PROCEDURE — 85652 RBC SED RATE AUTOMATED: CPT | Mod: ORL | Performed by: INTERNAL MEDICINE

## 2021-08-31 PROCEDURE — 84450 TRANSFERASE (AST) (SGOT): CPT | Mod: ORL | Performed by: INTERNAL MEDICINE

## 2021-08-31 PROCEDURE — 86141 C-REACTIVE PROTEIN HS: CPT | Mod: ORL | Performed by: INTERNAL MEDICINE

## 2021-08-31 PROCEDURE — 82565 ASSAY OF CREATININE: CPT | Mod: ORL | Performed by: INTERNAL MEDICINE

## 2021-08-31 PROCEDURE — 84520 ASSAY OF UREA NITROGEN: CPT | Mod: ORL | Performed by: INTERNAL MEDICINE

## 2021-08-31 NOTE — TELEPHONE ENCOUNTER
Typing error in epic and when given updated orders to [facility]. Per Dr Sam HANSEN on 8/27/21, is aware pt is following up with Dr Mendoza on 9/7/21. Ok to extend iv abx until 9/8/21.

## 2021-09-01 NOTE — PROGRESS NOTES
This is a recent snapshot of the patient's [facility] medical record.  For current drug dose and complete information and questions, call [phone] or In Basket pool, fv home infusion ([id number])  CSN Number:  [id number]

## 2021-09-02 ENCOUNTER — HOME INFUSION (PRE-WILLOW HOME INFUSION) (OUTPATIENT)
Dept: PHARMACY | Facility: CLINIC | Age: 76
End: 2021-09-02

## 2021-09-02 DIAGNOSIS — L03.115 CELLULITIS OF RIGHT LOWER EXTREMITY: Primary | ICD-10-CM

## 2021-09-02 NOTE — PROCEDURES
"REWIRED PICC Line Insertion Procedure Note  Pt. Name: Eilzabeth Kelley  MRN:        [id number]    Procedure: Insertion of a  Single Lumen  4 fr  Bard SOLO (valved) Power PICC, Lot number [id number]    Indications: antibiotics    Contraindications : none    Procedure Details   Patient identified with 2 identifiers and \"Time Out\" conducted.  .     Central line insertion bundle followed: hand hygeine performed prior to procedure, site cleansed with cholraprep, hat, mask, sterile gloves,sterile gown worn, patient draped with maximum barrier head to toe drape, sterile field maintained.    The vein was assessed and found to be compressible and of adequate size. 1 ml 1% Lidocaine administered sq to the insertion site. A 4 Fr PICC was inserted into the brachial rewired vein of the right arm 0  attempt(s) required to access vein.   Catheter threaded without difficulty. Good blood return noted.    Modified Seldinger Technique used for insertion.    The 8 sharps that are included in the PICC insertion kit were accounted for and disposed of in the sharps container prior to breakdown of the sterile field.    Catheter secured with Statlock, biopatch and Tegaderm dressing applied.    Findings:  Total catheter length  41 cm, with 1 cm exposed. Mid upper arm circumference is 40 cm. Catheter was flushed with 20 cc NS. Patient  tolerated procedure well.    Tip placement verified by 3CG . Tip placement in the SVC.    CLABSI prevention brochure left at bedside.    Patient's primary RN notified PICC is ready for use.    Comments:  Thanks    [facility]          "

## 2021-09-02 NOTE — PROGRESS NOTES
Dr Joy was contacted by the home care nurse that the pt's PICC is out 10 cm. Per Dr Joy, the PICC needs to be replaced. Orders placed per Dr Joy.

## 2021-09-03 ENCOUNTER — HOME INFUSION (PRE-WILLOW HOME INFUSION) (OUTPATIENT)
Dept: PHARMACY | Facility: CLINIC | Age: 76
End: 2021-09-03

## 2021-09-07 ENCOUNTER — OFFICE VISIT (OUTPATIENT)
Dept: INFECTIOUS DISEASES | Facility: CLINIC | Age: 76
End: 2021-09-07
Payer: COMMERCIAL

## 2021-09-07 ENCOUNTER — HOME INFUSION (PRE-WILLOW HOME INFUSION) (OUTPATIENT)
Dept: PHARMACY | Facility: CLINIC | Age: 76
End: 2021-09-07

## 2021-09-07 ENCOUNTER — LAB REQUISITION (OUTPATIENT)
Dept: LAB | Facility: CLINIC | Age: 76
End: 2021-09-07
Payer: COMMERCIAL

## 2021-09-07 VITALS
HEART RATE: 80 BPM | SYSTOLIC BLOOD PRESSURE: 120 MMHG | TEMPERATURE: 98.3 F | WEIGHT: 250 LBS | DIASTOLIC BLOOD PRESSURE: 70 MMHG | BODY MASS INDEX: 47.24 KG/M2

## 2021-09-07 DIAGNOSIS — L03.115 CELLULITIS OF RIGHT LOWER EXTREMITY: Primary | ICD-10-CM

## 2021-09-07 LAB
ALP SERPL-CCNC: 152 U/L (ref 45–120)
AST SERPL W P-5'-P-CCNC: 24 U/L (ref 0–40)
BASOPHILS # BLD AUTO: 0.1 10E3/UL (ref 0–0.2)
BASOPHILS NFR BLD AUTO: 1 %
BUN SERPL-MCNC: 24 MG/DL (ref 8–28)
CREAT SERPL-MCNC: 0.81 MG/DL (ref 0.6–1.1)
EOSINOPHIL # BLD AUTO: 0.4 10E3/UL (ref 0–0.7)
EOSINOPHIL NFR BLD AUTO: 4 %
ERYTHROCYTE [DISTWIDTH] IN BLOOD BY AUTOMATED COUNT: 14.6 % (ref 10–15)
ERYTHROCYTE [SEDIMENTATION RATE] IN BLOOD BY WESTERGREN METHOD: 58 MM/HR (ref 0–20)
GFR SERPL CREATININE-BSD FRML MDRD: 72 ML/MIN/1.73M2
HCT VFR BLD AUTO: 35 % (ref 35–47)
HGB BLD-MCNC: 10.9 G/DL (ref 11.7–15.7)
IMM GRANULOCYTES # BLD: 0 10E3/UL
IMM GRANULOCYTES NFR BLD: 0 %
LYMPHOCYTES # BLD AUTO: 1.4 10E3/UL (ref 0.8–5.3)
LYMPHOCYTES NFR BLD AUTO: 15 %
MCH RBC QN AUTO: 26.5 PG (ref 26.5–33)
MCHC RBC AUTO-ENTMCNC: 31.1 G/DL (ref 31.5–36.5)
MCV RBC AUTO: 85 FL (ref 78–100)
MONOCYTES # BLD AUTO: 0.9 10E3/UL (ref 0–1.3)
MONOCYTES NFR BLD AUTO: 10 %
NEUTROPHILS # BLD AUTO: 6.6 10E3/UL (ref 1.6–8.3)
NEUTROPHILS NFR BLD AUTO: 70 %
NRBC # BLD AUTO: 0 10E3/UL
NRBC BLD AUTO-RTO: 0 /100
PLATELET # BLD AUTO: 353 10E3/UL (ref 150–450)
RBC # BLD AUTO: 4.12 10E6/UL (ref 3.8–5.2)
WBC # BLD AUTO: 9.4 10E3/UL (ref 4–11)

## 2021-09-07 PROCEDURE — 36592 COLLECT BLOOD FROM PICC: CPT | Performed by: INTERNAL MEDICINE

## 2021-09-07 PROCEDURE — 85025 COMPLETE CBC W/AUTO DIFF WBC: CPT | Performed by: INTERNAL MEDICINE

## 2021-09-07 PROCEDURE — 82565 ASSAY OF CREATININE: CPT | Performed by: INTERNAL MEDICINE

## 2021-09-07 PROCEDURE — 99214 OFFICE O/P EST MOD 30 MIN: CPT | Performed by: INTERNAL MEDICINE

## 2021-09-07 PROCEDURE — 36591 DRAW BLOOD OFF VENOUS DEVICE: CPT | Performed by: INTERNAL MEDICINE

## 2021-09-07 PROCEDURE — 85652 RBC SED RATE AUTOMATED: CPT | Performed by: INTERNAL MEDICINE

## 2021-09-07 PROCEDURE — 84075 ASSAY ALKALINE PHOSPHATASE: CPT | Performed by: INTERNAL MEDICINE

## 2021-09-07 PROCEDURE — 84520 ASSAY OF UREA NITROGEN: CPT | Performed by: INTERNAL MEDICINE

## 2021-09-07 PROCEDURE — 84450 TRANSFERASE (AST) (SGOT): CPT | Performed by: INTERNAL MEDICINE

## 2021-09-07 RX ORDER — MEROPENEM 1 G/1
1 INJECTION, POWDER, FOR SOLUTION INTRAVENOUS EVERY 8 HOURS
COMMUNITY
End: 2021-09-07

## 2021-09-07 RX ORDER — MEROPENEM 1 G/1
1 INJECTION, POWDER, FOR SOLUTION INTRAVENOUS EVERY 8 HOURS
Qty: 63 EACH | Refills: 0 | Status: SHIPPED | OUTPATIENT
Start: 2021-09-07 | End: 2021-09-28

## 2021-09-07 NOTE — PROGRESS NOTES
[facility] FOLLOW UP NOTE      Date: 09/07/2021   Patient Name: Elizabeth Kelley   YOB: 1945  MRN: [id number]      ASSESSMENT:  RLE cellulitis with weeping ulcers, pseudomonas in wound, clinically improving on meropenem IV. Improving on exam still significant open areas. CRP has been elevated, but improving however not checked on labs earlier today.         PLAN:  - given improvement with meropenem, continue  - f/u in ~3 weeks for recheck  - f/u with vascular clinic  - trend CRP, CBC diff  - if wound worsens, will need to add MRSA back.   - not on topical gent due to skin reaction per chart    Kelley Snyder MD  [facility]   Clinic phone: [phone]   Clinic fax: [phone]    ______________________________________________________________________    SUBJECTIVE / INTERVAL HISTORY: Elizabeth Kelley returns for follow up of RLE cellulitis.  Has been on IV abx for around a month. Doing better. Lots of drainage. Still open raw areas. Tolerating meropenem IV, no issues with PICC. Some issues with taping around lines.      ROS: All other systems negative except as listed above.      Current Outpatient Medications:      meropenem (MERREM) 1 g vial, Inject 1,000 mg (1 g) into the vein every 8 hours for 21 days, Disp: 63 each, Rfl: 0     aspirin 81 mg chewable tablet, Take 81 mg by mouth every evening , Disp: , Rfl:      buPROPion (WELLBUTRIN SR) 150 MG 12 hr tablet, Take 150 mg by mouth every morning , Disp: , Rfl:      cholecalciferol, vitamin D3, 1,000 unit tablet, [CHOLECALCIFEROL, VITAMIN D3, 1,000 UNIT TABLET] Take 2,000 Units by mouth daily., Disp: , Rfl:      citalopram (CELEXA) 40 MG tablet, Take 40 mg by mouth every morning , Disp: , Rfl:      furosemide (LASIX) 20 MG tablet, Take 20 mg by mouth 2 times daily AM and afternoon (4PM), Disp: , Rfl:      multivitamin therapeutic (THERAGRAN) tablet, [MULTIVITAMIN THERAPEUTIC (THERAGRAN) TABLET] Take 1  tablet by mouth daily., Disp: , Rfl:      multivitamin w/minerals (CENTRUM ADULTS) tablet, as directed, Disp: , Rfl:      naproxen sodium (ALEVE) 220 MG tablet, Take 440 mg by mouth daily as needed , Disp: , Rfl:      oxyCODONE (ROXICODONE) 5 MG tablet, Take 1 tablet (5 mg) by mouth every 8 hours as needed for breakthrough pain or severe pain, Disp: 9 tablet, Rfl: 0     pantoprazole (PROTONIX) 40 MG tablet, [PANTOPRAZOLE (PROTONIX) 40 MG TABLET] Take 40 mg by mouth daily., Disp: , Rfl:      potassium chloride ER (KLOR-CON M) 20 MEQ CR tablet, Take 20 mEq by mouth every evening, Disp: , Rfl:      potassium chloride SA (K-DUR,KLOR-CON) 20 MEQ tablet, Take 40 mEq by mouth every morning , Disp: , Rfl:      rOPINIRole (REQUIP) 1 MG tablet, Take 1 mg by mouth At Bedtime, Disp: , Rfl:      rOPINIRole (REQUIP) 1 MG tablet, Take 0.5 mg by mouth 2 times daily AM and afternoon, Disp: , Rfl:      simvastatin (ZOCOR) 40 MG tablet, [SIMVASTATIN (ZOCOR) 40 MG TABLET] Take 40 mg by mouth bedtime., Disp: , Rfl:      spironolactone (ALDACTONE) 25 MG tablet, [SPIRONOLACTONE (ALDACTONE) 25 MG TABLET] Take 25 mg by mouth daily., Disp: , Rfl:       OBJECTIVE:  /70   Pulse 80   Temp 98.3  F (36.8  C)   Wt 113.4 kg (250 lb)   BMI 47.24 kg/m        GEN: No acute distress.    RESPIRATORY:  Normal breathing pattern.   EXTREMITIES: No edema.  SKIN/HAIR/NAILS: more healing than compared to prior imaging.          Pertinent labs:    CRP   Date Value Ref Range Status   08/31/2021 4.1 (H) 0.0-<0.8 mg/dL Final     Last Comprehensive Metabolic Panel:  Sodium   Date Value Ref Range Status   08/02/2021 140 136 - 145 mmol/L Final     Potassium   Date Value Ref Range Status   08/02/2021 4.9 3.5 - 5.0 mmol/L Final     Chloride   Date Value Ref Range Status   08/02/2021 103 98 - 107 mmol/L Final     Carbon Dioxide (CO2)   Date Value Ref Range Status   08/02/2021 24 22 - 31 mmol/L Final     Anion Gap   Date Value Ref Range Status   08/02/2021 13  5 - 18 mmol/L Final     Glucose   Date Value Ref Range Status   08/02/2021 83 70 - 125 mg/dL Final     Urea Nitrogen   Date Value Ref Range Status   09/07/2021 24 8 - 28 mg/dL Final     Creatinine   Date Value Ref Range Status   09/07/2021 0.81 0.60 - 1.10 mg/dL Final     GFR Estimate   Date Value Ref Range Status   09/07/2021 72 >60 mL/min/1.73m2 Final     Comment:     As of July 11, 2021, eGFR is calculated by the CKD-EPI creatinine equation, without race adjustment. eGFR can be influenced by muscle mass, exercise, and diet. The reported eGFR is an estimation only and is only applicable if the renal function is stable.   09/26/2020 >60 >60 mL/min/1.73m2 Final     Calcium   Date Value Ref Range Status   08/02/2021 10.0 8.5 - 10.5 mg/dL Final     CBC RESULTS:   Recent Labs   Lab Test 09/07/21  1000   WBC 9.4   RBC 4.12   HGB 10.9*   HCT 35.0   MCV 85   MCH 26.5   MCHC 31.1*   RDW 14.6          MICROBIOLOGY DATA:    T Cell Subset:  WBC Count   Date Value Ref Range Status   09/07/2021 9.4 4.0 - 11.0 10e3/uL Final     % Lymphocytes   Date Value Ref Range Status   09/07/2021 15 % Final       HIV-1 RNA Quantitative:  No results found for: HIQT     RADIOLOGY:    No results found for this or any previous visit (from the past 744 hour(s)).     Total Time Spent 30 minutes including chart review, time with patient, orders, and documentation.

## 2021-09-08 NOTE — PROGRESS NOTES
This is a recent snapshot of the patient's [facility] medical record.  For current drug dose and complete information and questions, call [phone] or In Basket pool, fv home infusion ([id number])  CSN Number:  [id number]

## 2021-09-10 ENCOUNTER — OFFICE VISIT (OUTPATIENT)
Dept: VASCULAR SURGERY | Facility: CLINIC | Age: 76
End: 2021-09-10
Attending: NURSE PRACTITIONER
Payer: COMMERCIAL

## 2021-09-10 ENCOUNTER — HOME INFUSION (PRE-WILLOW HOME INFUSION) (OUTPATIENT)
Dept: PHARMACY | Facility: CLINIC | Age: 76
End: 2021-09-10

## 2021-09-10 VITALS
SYSTOLIC BLOOD PRESSURE: 118 MMHG | OXYGEN SATURATION: 98 % | HEIGHT: 61 IN | TEMPERATURE: 98.2 F | BODY MASS INDEX: 47.24 KG/M2 | HEART RATE: 77 BPM | DIASTOLIC BLOOD PRESSURE: 70 MMHG

## 2021-09-10 DIAGNOSIS — L90.5 SCAR CONDITION AND FIBROSIS OF SKIN: ICD-10-CM

## 2021-09-10 DIAGNOSIS — E66.01 MORBID OBESITY (H): ICD-10-CM

## 2021-09-10 DIAGNOSIS — L97.912 CHRONIC ULCER OF RIGHT LEG, WITH FAT LAYER EXPOSED (H): Primary | ICD-10-CM

## 2021-09-10 DIAGNOSIS — L08.9 RECURRENT INFECTION OF SKIN: ICD-10-CM

## 2021-09-10 DIAGNOSIS — I89.0 ACQUIRED LYMPHEDEMA OF LEG: ICD-10-CM

## 2021-09-10 PROCEDURE — 99213 OFFICE O/P EST LOW 20 MIN: CPT | Performed by: NURSE PRACTITIONER

## 2021-09-10 RX ORDER — HYDROCODONE BITARTRATE AND ACETAMINOPHEN 5; 325 MG/1; MG/1
TABLET ORAL
COMMUNITY
Start: 2021-09-10 | End: 2021-09-14

## 2021-09-10 ASSESSMENT — PAIN SCALES - GENERAL: PAINLEVEL: MODERATE PAIN (5)

## 2021-09-10 NOTE — PROGRESS NOTES
[facility] VASCULAR - [place]     FOLLOW UP NOTE      Date of Service: 7/30/2021    Date Last Seen: 7/9/2021; 7/21/2021    Chief Complaint: Right lower leg ulcer    Pt returns to [facility] with regards to the above listed concern(s).  Pertinent medical history includes multiple sclerosis, obesity, acquired lymphedema following lymphectomies, and venous insufficiency s/p Right Lower Extremity interventions. She has a history of recurrent lower limb cellulitis and  Left Lower Extremity ulcers which have healed. She also has an altered gait secondary to bilateral valgus deformity, left ankle contracture and bilateral flat feel in conjunction with multiple sclerosis.    The patient was seen urgently on Monday 7/05/2021, where concern of infection was noted. Tissue was debrided and a swab culture obtained via Nugent method. Preliminary results showed 1+ gram positive cocci in pairs and no polymorphonuclear leukocytes. Patient started on levofloxacin (Levaquin) and clindamycin added upon final sensitivity results. Pain did not improve with outpatient therapy and the patient was hospitalized for leg cellulitis (7/21-7/27/2021); treated with Zosyn and vancomycin and discharged on Amoxicillin/clavulanic acid (Augmentin) and doxycycline (Monodox, Vibramycin) per ID. She has since seen ID and Amoxicillin/clavulanic acid (Augmentin) and doxycycline (Monodox, Vibramycin) have been extended with the addition of meropenem. Follow up ID on 9/07/2021 with continuation of only meropenem.     Subjective:  Today the patient reports feeling well; Denies fevers, chills. No shortness of breath. Patient is applying Sorbact Swab and diapers to her leg. Compression applied with tubular compression. Pumps on hold per ID. Pain is rated 5 out of 10 and mostly occurs in the early morning. It is describes as burning pain. She does change her dressings in the evenings prior to bed. This means that relatively dry  dressings are placed on her legs immediately prior to long period of elevation and reduction of edema. Burning pain is likely caused by areas of the ulceration becoming too dry.       Allergies: Other environmental allergy, Adhesive tape, Adhesive [mecrylate], and Vicodin [hydrocodone-acetaminophen]      Medications:   Current Outpatient Medications:      aspirin 81 mg chewable tablet, Take 81 mg by mouth every evening , Disp: , Rfl:      buPROPion (WELLBUTRIN SR) 150 MG 12 hr tablet, Take 150 mg by mouth every morning , Disp: , Rfl:      cholecalciferol, vitamin D3, 1,000 unit tablet, [CHOLECALCIFEROL, VITAMIN D3, 1,000 UNIT TABLET] Take 2,000 Units by mouth daily., Disp: , Rfl:      citalopram (CELEXA) 40 MG tablet, Take 40 mg by mouth every morning , Disp: , Rfl:      furosemide (LASIX) 20 MG tablet, Take 20 mg by mouth 2 times daily AM and afternoon (4PM), Disp: , Rfl:      meropenem (MERREM) 1 g vial, Inject 1,000 mg (1 g) into the vein every 8 hours for 21 days, Disp: 63 each, Rfl: 0     multivitamin therapeutic (THERAGRAN) tablet, [MULTIVITAMIN THERAPEUTIC (THERAGRAN) TABLET] Take 1 tablet by mouth daily., Disp: , Rfl:      multivitamin w/minerals (CENTRUM ADULTS) tablet, as directed, Disp: , Rfl:      naproxen sodium (ALEVE) 220 MG tablet, Take 440 mg by mouth daily as needed , Disp: , Rfl:      oxyCODONE (ROXICODONE) 5 MG tablet, Take 1 tablet (5 mg) by mouth every 8 hours as needed for breakthrough pain or severe pain, Disp: 9 tablet, Rfl: 0     pantoprazole (PROTONIX) 40 MG tablet, [PANTOPRAZOLE (PROTONIX) 40 MG TABLET] Take 40 mg by mouth daily., Disp: , Rfl:      potassium chloride ER (KLOR-CON M) 20 MEQ CR tablet, Take 20 mEq by mouth every evening, Disp: , Rfl:      potassium chloride SA (K-DUR,KLOR-CON) 20 MEQ tablet, Take 40 mEq by mouth every morning , Disp: , Rfl:      rOPINIRole (REQUIP) 1 MG tablet, Take 1 mg by mouth At Bedtime, Disp: , Rfl:      rOPINIRole (REQUIP) 1 MG tablet, Take 0.5 mg by  "mouth 2 times daily AM and afternoon, Disp: , Rfl:      simvastatin (ZOCOR) 40 MG tablet, [SIMVASTATIN (ZOCOR) 40 MG TABLET] Take 40 mg by mouth bedtime., Disp: , Rfl:      spironolactone (ALDACTONE) 25 MG tablet, [SPIRONOLACTONE (ALDACTONE) 25 MG TABLET] Take 25 mg by mouth daily., Disp: , Rfl:       History:   Past Medical History:   Diagnosis Date     Ankle contracture, left      Class 3 severe obesity due to excess calories without serious comorbidity with body mass index (BMI) of 45.0 to 49.9 in adult (H)      Combined gastric and duodenal ulcer      Depression      Dyslipidemia      Edema      Gait abnormality      GERD (gastroesophageal reflux disease)      Lymphedema of both lower extremities      Melanoma (H)     left upper arm     MS (multiple sclerosis) (H)      RLS (restless legs syndrome)      Skin ulcer of left lower leg (H)      Valgus deformity of both feet      Vitamin D deficiency          Physical Exam:    /70 (BP Location: Left arm)   Pulse 77   Temp 98.2  F (36.8  C) (Oral)   Ht 5' 1\" (1.549 m)   SpO2 98%   BMI 47.24 kg/m    Weight is 0 lbs 0 oz          Body mass index is 47.24 kg/m .    General:  Patient presents to clinic in no apparent distress.  Head: normocephalic atraumatic  Psychiatric:  Alert and oriented x3.   Respiratory: unlabored breathing; no cough  Integumentary:  Skin is uniformly warm, dry and pink.    Peripheral Vascular:  Stable, non pitting edema of the right lower leg with lipodermatosclerosis and significant scarring and fibrosis. No edema of the toes or foot, right.     Circumferential volume measures:       Ulceration(s)/Wound(s):   Wound/Ulcer location: RIGHT LOWER LEG   Size: 19.0 cm L x 25.0 cm W x 0.2 cm D   Edges: Attached with irregular borders; epithelial islands and penisulas present within the ulcer base that are not reflected in measurements.   Undermining: none  Base: pink with yellow fibrin and scattered areas of desiccated adipose tissue. " "  Tissues: dermis  Thickness: partial  Exudate Type: serous  Exudate Amount: copious  Michelle-Wound/Ulcer: no warmth or erythema  Odor: none      Laboratory/Diagnostics studes:    No components found for: SEDRATE  No results found for: CREATININE  No results found for: HGBA1C  Lab Results   Component Value Date    BUN 24 09/07/2021     Lab Results   Component Value Date    ALBUMIN 3.4 (L) 08/02/2021     No components found for: [id number]      Diagnoses:  1. Chronic ulcer of right leg, with fat layer exposed (H)    2. Acquired lymphedema of leg    3. Recurrent infection of skin    4. Scar condition and fibrosis of skin    5. Morbid obesity (H)         Photo:  9/10/2021  Right lower leg          Are any of these wounds new today: No; Location: NA.      Assessment/Plan:  1. Procedure: NA.     2. Treatment: wound treatment will include irrigation and dressings to promote autolytic debridement which will include: primary Sorbact Swab and secondary dressing of baby diapers with leg gussets removed, daily. Patient may try application of Vaseline into Sorbact over areas that \"burn\" with pain in the early morning to prevent desiccation of tissue.     3. Edema: tubular and Coban compression. Lymphedema pumps on hold.     4. Offloading: NA    5. Nutrition: NA    6. Diagnostics: NA    7. Medications: NA    8. Referrals: NA    9. Education: See above. Education provided regarding the above plan of care, expected changes in the wound/ulcer base and signs/symptoms of concern, as well as ordered labs and diagnostics. Patient verbalizes understanding and all questions answered to their satisfaction.        Impression:  Elizabeth Kelley is a 76 year old patient with extensive ulceration of the entire right lower leg complicated by recurrent infection, acquired lymphedema following lymphectomies, and venous insufficiency s/p Right Lower Extremity interventions. She has a history of Left Lower Extremity ulcers which have healed. She " also has an altered gait secondary to bilateral valgus deformity, left ankle contracture and bilateral flat feel in conjunction with multiple sclerosis. She continues to be ambulatory without assistive device. The patient has started lymphedema therapy to redirect lymph fluid back into the circulatory system.     Today, the Right Lower Extremity ulcer is limited to skin breakdown. Size measurements are slightly larger however there are new epithelial islands and peninsulas that are not accounted for in the measurements; there are no signs of infection.Patient continues with IV monotherapy for long term infection therapy. Will continue plan of care with antimicrobial layer that allows lymphatic drainage to pass through to super-absorbant dressing. Patient will apply mild compression with tubular and Coban compression.       Follow Up:  Patient will follow up in 2 weeks for reevaluation. They were instructed to call the clinic sooner with any signs or symptoms of infection or any further questions/concerns. Answered all questions.      Maria G Yeboah, MSN, CNP, CWOCN-AP  [facility]  [phone]

## 2021-09-10 NOTE — LETTER
"9/10/2021    [facility]  Fax: [phone] Wound Dressing Rx and Order Form  Customer Service: [phone] Order Status: Reorder   Verbal: Fawn LARIOS CMA  Patient Info:  Name: Elizabeth Kelley  : 1945  Address: [address] [place] [id number]    Insurance Info:  INSURER: Payor: [employer] / Plan: [employer] / Product Type: HMO /   Policy ID#:  [id number]  : 1945    Physician Info:   Name: Maria G Benton CNP   Dept Address/Phones:   [address] [place] [address], [address]  [facility] [address] [phone]  Fax: [phone]    Diagnosis:     Impression:   Encounter Diagnoses   Name Primary?     Chronic ulcer of right leg, with fat layer exposed (H) Yes     Acquired lymphedema of leg      Recurrent infection of skin      Scar condition and fibrosis of skin      Morbid obesity (H)      Wound info:    VASC Wound right lower leg (Active)   Pre Size Length 19 09/10/21 1300   Pre Size Width 25 09/10/21 1300   Pre Size Depth 0.2 09/10/21 1300       Drainage: Heavy  Thickness:  full  Duration of Need: 90 days  Days Supply: 30 days  Start Date: 9/10/2021  Starter Kit:ancillary  Qualifying wound/Debridement: yes     Dressing Type Brand Size Number of pieces Frequency of change   Primary Sorbact Swab  2.5\"x3.5\" max daily       Note: If total out of pocket is more than $50.00 please contact the patient before processing order.     OK to forward to covered supplier.    Electronically Signed Physician: MARIA G BENTON                         Date: 9/10/2021    "

## 2021-09-10 NOTE — PATIENT INSTRUCTIONS
"Wound Care Instructions    Right lower leg:  Cleanse with wound wash or bottled water. Use non-sterile 4x4 gauze to gently remove any loosening tissue or debris and pat dry with non-sterile gauze.     Primary Dressing: Apply Sorbact Swab 2.5\"x3.5\" over entire area of ulceration. Can impregnate Vaseline into areas of Sorbact that will cover areas of ulcer that develop burning pain in the night.     Secondary dressing: Cover with baby diapers with leg gussets removed.    Change daily.    Secure with Coban.      Activity:  -Sleep in bed at night.  -Twice daily (1 hour between normal meal times) lay flat on couch or bed to reduce edema.   -Perform ankle circles and pumps while lying flat to bring fluid up out of legs.  -Walk as much as tolerated, this is good for edema.    Diet:  -Avoid salt and sugar as this retains water and will worsen edema.    Bathing:  -You may shower with waterproof dressing cover.  -Do not soak ulcers.  -Do not leave open to air.     SEEK MEDICAL CARE IF:    You have an increase in swelling, pain, or redness around the wound.    You have an increase in the amount of pus coming from the wound.    There is a bad smell coming from the wound.    The wound appears to be worsening/enlarging    You have a fever greater than 101.5 F      It is ok to continue current wound care treatment/products for the next 2-3 days until new wound care supplies are ordered and arrive. If longer than this please contact our office at [phone].    Maria G Yeboah, MSN, CNP, CWOCN-AP  [facility]  [phone]   "

## 2021-09-13 NOTE — PROGRESS NOTES
This is a recent snapshot of the patient's [facility] medical record.  For current drug dose and complete information and questions, call [phone] or In Basket pool, fv home infusion ([id number])  CSN Number:  [id number]

## 2021-09-14 ENCOUNTER — LAB REQUISITION (OUTPATIENT)
Dept: LAB | Facility: CLINIC | Age: 76
End: 2021-09-14
Payer: COMMERCIAL

## 2021-09-14 ENCOUNTER — HOME INFUSION (PRE-WILLOW HOME INFUSION) (OUTPATIENT)
Dept: PHARMACY | Facility: CLINIC | Age: 76
End: 2021-09-14

## 2021-09-14 DIAGNOSIS — L03.115 CELLULITIS OF RIGHT LOWER LIMB: ICD-10-CM

## 2021-09-14 LAB
AST SERPL W P-5'-P-CCNC: 20 U/L (ref 0–40)
BASOPHILS # BLD AUTO: 0.1 10E3/UL (ref 0–0.2)
BASOPHILS NFR BLD AUTO: 1 %
BUN SERPL-MCNC: 22 MG/DL (ref 8–28)
C REACTIVE PROTEIN LHE: 4.1 MG/DL (ref 0–0.8)
CREAT SERPL-MCNC: 0.8 MG/DL (ref 0.6–1.1)
EOSINOPHIL # BLD AUTO: 0.2 10E3/UL (ref 0–0.7)
EOSINOPHIL NFR BLD AUTO: 2 %
ERYTHROCYTE [DISTWIDTH] IN BLOOD BY AUTOMATED COUNT: 14.6 % (ref 10–15)
ERYTHROCYTE [SEDIMENTATION RATE] IN BLOOD BY WESTERGREN METHOD: 67 MM/HR (ref 0–20)
GFR SERPL CREATININE-BSD FRML MDRD: 73 ML/MIN/1.73M2
HCT VFR BLD AUTO: 34.7 % (ref 35–47)
HGB BLD-MCNC: 11 G/DL (ref 11.7–15.7)
IMM GRANULOCYTES # BLD: 0.1 10E3/UL
IMM GRANULOCYTES NFR BLD: 1 %
LYMPHOCYTES # BLD AUTO: 1.4 10E3/UL (ref 0.8–5.3)
LYMPHOCYTES NFR BLD AUTO: 14 %
MCH RBC QN AUTO: 26.6 PG (ref 26.5–33)
MCHC RBC AUTO-ENTMCNC: 31.7 G/DL (ref 31.5–36.5)
MCV RBC AUTO: 84 FL (ref 78–100)
MONOCYTES # BLD AUTO: 0.8 10E3/UL (ref 0–1.3)
MONOCYTES NFR BLD AUTO: 8 %
NEUTROPHILS # BLD AUTO: 7.1 10E3/UL (ref 1.6–8.3)
NEUTROPHILS NFR BLD AUTO: 74 %
NRBC # BLD AUTO: 0 10E3/UL
NRBC BLD AUTO-RTO: 0 /100
PLATELET # BLD AUTO: 345 10E3/UL (ref 150–450)
RBC # BLD AUTO: 4.13 10E6/UL (ref 3.8–5.2)
WBC # BLD AUTO: 9.7 10E3/UL (ref 4–11)

## 2021-09-14 PROCEDURE — 85652 RBC SED RATE AUTOMATED: CPT | Performed by: INTERNAL MEDICINE

## 2021-09-14 PROCEDURE — 82565 ASSAY OF CREATININE: CPT | Performed by: INTERNAL MEDICINE

## 2021-09-14 PROCEDURE — 86141 C-REACTIVE PROTEIN HS: CPT | Performed by: INTERNAL MEDICINE

## 2021-09-14 PROCEDURE — 85025 COMPLETE CBC W/AUTO DIFF WBC: CPT | Performed by: INTERNAL MEDICINE

## 2021-09-14 PROCEDURE — 84450 TRANSFERASE (AST) (SGOT): CPT | Performed by: INTERNAL MEDICINE

## 2021-09-14 PROCEDURE — 84520 ASSAY OF UREA NITROGEN: CPT | Performed by: INTERNAL MEDICINE

## 2021-09-15 ENCOUNTER — HOME INFUSION (PRE-WILLOW HOME INFUSION) (OUTPATIENT)
Dept: PHARMACY | Facility: CLINIC | Age: 76
End: 2021-09-15

## 2021-09-15 NOTE — PROGRESS NOTES
This is a recent snapshot of the patient's [facility] medical record.  For current drug dose and complete information and questions, call [phone] or In Basket pool, fv home infusion ([id number])  CSN Number:  [id number]

## 2021-09-17 ENCOUNTER — HOSPITAL ENCOUNTER (OUTPATIENT)
Dept: PHYSICAL THERAPY | Facility: REHABILITATION | Age: 76
End: 2021-09-17
Payer: COMMERCIAL

## 2021-09-17 DIAGNOSIS — L97.811 VENOUS STASIS ULCER OF OTHER PART OF RIGHT LOWER LEG LIMITED TO BREAKDOWN OF SKIN WITH VARICOSE VEINS (H): ICD-10-CM

## 2021-09-17 DIAGNOSIS — I89.0 LYMPHEDEMA: Primary | ICD-10-CM

## 2021-09-17 DIAGNOSIS — I83.018 VENOUS STASIS ULCER OF OTHER PART OF RIGHT LOWER LEG LIMITED TO BREAKDOWN OF SKIN WITH VARICOSE VEINS (H): ICD-10-CM

## 2021-09-17 DIAGNOSIS — R22.43 LOCALIZED SWELLING OF BOTH LOWER LEGS: ICD-10-CM

## 2021-09-17 PROCEDURE — 97140 MANUAL THERAPY 1/> REGIONS: CPT | Mod: GP | Performed by: PHYSICAL THERAPY ASSISTANT

## 2021-09-17 NOTE — PROGRESS NOTES
This is a recent snapshot of the patient's [facility] medical record.  For current drug dose and complete information and questions, call [phone] or In [facility] pool, fv home infusion ([id number])  CSN Number:  [id number]

## 2021-09-21 ENCOUNTER — HOME INFUSION (PRE-WILLOW HOME INFUSION) (OUTPATIENT)
Dept: PHARMACY | Facility: CLINIC | Age: 76
End: 2021-09-21

## 2021-09-21 ENCOUNTER — LAB REQUISITION (OUTPATIENT)
Dept: LAB | Facility: CLINIC | Age: 76
End: 2021-09-21
Payer: COMMERCIAL

## 2021-09-21 DIAGNOSIS — L03.115 CELLULITIS OF RIGHT LOWER LIMB: ICD-10-CM

## 2021-09-21 LAB
AST SERPL W P-5'-P-CCNC: 17 U/L (ref 0–40)
BASOPHILS # BLD AUTO: 0.1 10E3/UL (ref 0–0.2)
BASOPHILS NFR BLD AUTO: 1 %
BUN SERPL-MCNC: 21 MG/DL (ref 8–28)
C REACTIVE PROTEIN LHE: 3 MG/DL (ref 0–0.8)
CREAT SERPL-MCNC: 0.85 MG/DL (ref 0.6–1.1)
EOSINOPHIL # BLD AUTO: 0.4 10E3/UL (ref 0–0.7)
EOSINOPHIL NFR BLD AUTO: 5 %
ERYTHROCYTE [DISTWIDTH] IN BLOOD BY AUTOMATED COUNT: 14.6 % (ref 10–15)
ERYTHROCYTE [SEDIMENTATION RATE] IN BLOOD BY WESTERGREN METHOD: 60 MM/HR (ref 0–20)
GFR SERPL CREATININE-BSD FRML MDRD: 68 ML/MIN/1.73M2
HCT VFR BLD AUTO: 34.9 % (ref 35–47)
HGB BLD-MCNC: 11 G/DL (ref 11.7–15.7)
IMM GRANULOCYTES # BLD: 0 10E3/UL
IMM GRANULOCYTES NFR BLD: 1 %
LYMPHOCYTES # BLD AUTO: 1.8 10E3/UL (ref 0.8–5.3)
LYMPHOCYTES NFR BLD AUTO: 25 %
MCH RBC QN AUTO: 26.6 PG (ref 26.5–33)
MCHC RBC AUTO-ENTMCNC: 31.5 G/DL (ref 31.5–36.5)
MCV RBC AUTO: 85 FL (ref 78–100)
MONOCYTES # BLD AUTO: 1 10E3/UL (ref 0–1.3)
MONOCYTES NFR BLD AUTO: 14 %
NEUTROPHILS # BLD AUTO: 4.1 10E3/UL (ref 1.6–8.3)
NEUTROPHILS NFR BLD AUTO: 54 %
NRBC # BLD AUTO: 0 10E3/UL
NRBC BLD AUTO-RTO: 0 /100
PLATELET # BLD AUTO: 357 10E3/UL (ref 150–450)
RBC # BLD AUTO: 4.13 10E6/UL (ref 3.8–5.2)
WBC # BLD AUTO: 7.4 10E3/UL (ref 4–11)

## 2021-09-21 PROCEDURE — 82565 ASSAY OF CREATININE: CPT | Performed by: INTERNAL MEDICINE

## 2021-09-21 PROCEDURE — 84520 ASSAY OF UREA NITROGEN: CPT | Performed by: INTERNAL MEDICINE

## 2021-09-21 PROCEDURE — 85025 COMPLETE CBC W/AUTO DIFF WBC: CPT | Performed by: INTERNAL MEDICINE

## 2021-09-21 PROCEDURE — 86141 C-REACTIVE PROTEIN HS: CPT | Performed by: INTERNAL MEDICINE

## 2021-09-21 PROCEDURE — 84450 TRANSFERASE (AST) (SGOT): CPT | Performed by: INTERNAL MEDICINE

## 2021-09-21 PROCEDURE — 85652 RBC SED RATE AUTOMATED: CPT | Performed by: INTERNAL MEDICINE

## 2021-09-21 NOTE — PROGRESS NOTES
This is a recent snapshot of the patient's [facility] medical record.  For current drug dose and complete information and questions, call [phone] or In Basket pool, fv home infusion ([id number])  CSN Number:  [id number]

## 2021-09-22 ENCOUNTER — TELEPHONE (OUTPATIENT)
Dept: INFECTIOUS DISEASES | Facility: CLINIC | Age: 76
End: 2021-09-22

## 2021-09-22 NOTE — TELEPHONE ENCOUNTER
"I called the pt, she states that her leg is red and painful since last night, and she is dizzy, sick to her stomach, has no appetite, \"not with it\" and \"not myself\", since this morning. I asked the pt if she has a greater area of redness when compared to her 9/7 appt picture, and she said no, however the red is a deeper red. The pt denies fever or chills. The pt states her pain is a burning pain that is intermittent. The pt is currently on IV abx, she had a CBC and ESR yesterday which are stable, with no WBC or ANC elevation. I informed the pt I will discuss with the MD and contact her back.   "

## 2021-09-22 NOTE — TELEPHONE ENCOUNTER
Dr Vargas & Team    Patient states she is not feeling well and her leg is looking really red.    Please call her back @ [phone]

## 2021-09-23 ENCOUNTER — HOME INFUSION (PRE-WILLOW HOME INFUSION) (OUTPATIENT)
Dept: PHARMACY | Facility: CLINIC | Age: 76
End: 2021-09-23

## 2021-09-23 NOTE — PROGRESS NOTES
This is a recent snapshot of the patient's [facility] medical record.  For current drug dose and complete information and questions, call [phone] or In Basket pool, fv home infusion ([id number])  CSN Number:  [id number]

## 2021-09-23 NOTE — PROGRESS NOTES
This is a recent snapshot of the patient's [facility] medical record.  For current drug dose and complete information and questions, call [phone] or In Basket pool, fv home infusion ([id number])  CSN Number:  [id number]

## 2021-09-23 NOTE — TELEPHONE ENCOUNTER
"I called the pt and advised that she go to the ER, per Dr Vargas. The pt states that \"it has now cleared\" so she is \"not going to worry about it\". I will inform Dr Vargas.   "

## 2021-09-24 NOTE — PROGRESS NOTES
This is a recent snapshot of the patient's [facility] medical record.  For current drug dose and complete information and questions, call [phone] or In Basket pool, fv home infusion ([id number])  CSN Number:  [id number]

## 2021-09-24 NOTE — PROGRESS NOTES
This is a recent snapshot of the patient's [facility] medical record.  For current drug dose and complete information and questions, call [phone] or In Basket pool, fv home infusion ([id number])  CSN Number:  [id number]

## 2021-09-24 NOTE — PROGRESS NOTES
This is a recent snapshot of the patient's [facility] medical record.  For current drug dose and complete information and questions, call [phone] or In [facility] pool, fv home infusion ([id number])  CSN Number:  [id number]

## 2021-09-27 ENCOUNTER — HOME INFUSION (PRE-WILLOW HOME INFUSION) (OUTPATIENT)
Dept: PHARMACY | Facility: CLINIC | Age: 76
End: 2021-09-27

## 2021-09-28 ENCOUNTER — LAB REQUISITION (OUTPATIENT)
Dept: LAB | Facility: CLINIC | Age: 76
End: 2021-09-28
Payer: COMMERCIAL

## 2021-09-28 ENCOUNTER — HOME INFUSION (PRE-WILLOW HOME INFUSION) (OUTPATIENT)
Dept: PHARMACY | Facility: CLINIC | Age: 76
End: 2021-09-28

## 2021-09-28 ENCOUNTER — TELEPHONE (OUTPATIENT)
Dept: INFECTIOUS DISEASES | Facility: CLINIC | Age: 76
End: 2021-09-28

## 2021-09-28 DIAGNOSIS — L03.115 CELLULITIS OF RIGHT LOWER LIMB: ICD-10-CM

## 2021-09-28 LAB
AST SERPL W P-5'-P-CCNC: 17 U/L (ref 0–40)
BASOPHILS # BLD AUTO: 0.1 10E3/UL (ref 0–0.2)
BASOPHILS NFR BLD AUTO: 1 %
BUN SERPL-MCNC: 21 MG/DL (ref 8–28)
C REACTIVE PROTEIN LHE: 2.9 MG/DL (ref 0–0.8)
CREAT SERPL-MCNC: 0.8 MG/DL (ref 0.6–1.1)
EOSINOPHIL # BLD AUTO: 0.3 10E3/UL (ref 0–0.7)
EOSINOPHIL NFR BLD AUTO: 3 %
ERYTHROCYTE [DISTWIDTH] IN BLOOD BY AUTOMATED COUNT: 14.8 % (ref 10–15)
ERYTHROCYTE [SEDIMENTATION RATE] IN BLOOD BY WESTERGREN METHOD: 62 MM/HR (ref 0–20)
GFR SERPL CREATININE-BSD FRML MDRD: 73 ML/MIN/1.73M2
HCT VFR BLD AUTO: 36.3 % (ref 35–47)
HGB BLD-MCNC: 11.2 G/DL (ref 11.7–15.7)
IMM GRANULOCYTES # BLD: 0.1 10E3/UL
IMM GRANULOCYTES NFR BLD: 1 %
LYMPHOCYTES # BLD AUTO: 1.5 10E3/UL (ref 0.8–5.3)
LYMPHOCYTES NFR BLD AUTO: 14 %
MCH RBC QN AUTO: 26 PG (ref 26.5–33)
MCHC RBC AUTO-ENTMCNC: 30.9 G/DL (ref 31.5–36.5)
MCV RBC AUTO: 84 FL (ref 78–100)
MONOCYTES # BLD AUTO: 1 10E3/UL (ref 0–1.3)
MONOCYTES NFR BLD AUTO: 10 %
NEUTROPHILS # BLD AUTO: 7.7 10E3/UL (ref 1.6–8.3)
NEUTROPHILS NFR BLD AUTO: 71 %
NRBC # BLD AUTO: 0 10E3/UL
NRBC BLD AUTO-RTO: 0 /100
PLATELET # BLD AUTO: 360 10E3/UL (ref 150–450)
RBC # BLD AUTO: 4.3 10E6/UL (ref 3.8–5.2)
WBC # BLD AUTO: 10.5 10E3/UL (ref 4–11)

## 2021-09-28 PROCEDURE — 85025 COMPLETE CBC W/AUTO DIFF WBC: CPT | Performed by: INTERNAL MEDICINE

## 2021-09-28 PROCEDURE — 86141 C-REACTIVE PROTEIN HS: CPT | Performed by: INTERNAL MEDICINE

## 2021-09-28 PROCEDURE — 82565 ASSAY OF CREATININE: CPT | Performed by: INTERNAL MEDICINE

## 2021-09-28 PROCEDURE — 85652 RBC SED RATE AUTOMATED: CPT | Performed by: INTERNAL MEDICINE

## 2021-09-28 PROCEDURE — 84520 ASSAY OF UREA NITROGEN: CPT | Performed by: INTERNAL MEDICINE

## 2021-09-28 PROCEDURE — 84450 TRANSFERASE (AST) (SGOT): CPT | Performed by: INTERNAL MEDICINE

## 2021-09-28 NOTE — TELEPHONE ENCOUNTER
Called pt, states she forgot about her appointments yesterday. Informed for pt to reach out to wound care and schedule f/u.    Pt is feeling well other than pain on R leg with cellulitis. Dr Vargas and Dr Snyder are fully booked until mid October. Per Dr Vargas ok to schedule with Dr Rodriguez, provider saw pt in hosp.    Appt scheduled. 9/30/21

## 2021-09-30 ENCOUNTER — OFFICE VISIT (OUTPATIENT)
Dept: INFECTIOUS DISEASES | Facility: CLINIC | Age: 76
End: 2021-09-30
Payer: COMMERCIAL

## 2021-09-30 ENCOUNTER — HOME INFUSION (PRE-WILLOW HOME INFUSION) (OUTPATIENT)
Dept: PHARMACY | Facility: CLINIC | Age: 76
End: 2021-09-30

## 2021-09-30 VITALS
WEIGHT: 250 LBS | DIASTOLIC BLOOD PRESSURE: 78 MMHG | BODY MASS INDEX: 47.24 KG/M2 | HEART RATE: 72 BPM | TEMPERATURE: 98.9 F | SYSTOLIC BLOOD PRESSURE: 126 MMHG

## 2021-09-30 DIAGNOSIS — L03.115 CELLULITIS OF RIGHT LOWER EXTREMITY: Primary | ICD-10-CM

## 2021-09-30 PROCEDURE — 99214 OFFICE O/P EST MOD 30 MIN: CPT | Performed by: INTERNAL MEDICINE

## 2021-09-30 NOTE — PROGRESS NOTES
[facility] FOLLOW UP NOTE      Date: 09/30/2021   Patient Name: Elizabeth Kelley   YOB: 1945  MRN: [id number]      ASSESSMENT:  RLE cellulitis with weeping ulcers, pseudomonas in wound. On iv meropenem since ~8/6 with some improvement. Wbc and CRP stable. More pain in the past weeks, with central areas not improving (see pictures below).         PLAN:  - continue meropenem  -start iv vancomycin x 2 weeks; auc 400-600  - f/u with vascular clinic  - trend CRP, CBC diff  - not on topical gent due to skin reaction per chart    Recommend f/up in 2 weeks with Dr. Sam Rodriguez MD  [facility]  Direct messaging: [employer] P[employer]  On-Call ID provider: [phone], option: 9    ______________________________________________________________________    SUBJECTIVE / INTERVAL HISTORY: Elizabeth Kelley returns for follow up of RLE cellulitis.  Continues on meropenem. Tolerating this and infusions. No rash or diarrhea. Difficulty with tape, but stable. Notices more pain, especially in am. Pain awakens her. Takes tylenol 1gm in am and again right before bed. Not on narcotic pain meds. Rarely takes alleve. Pain at other times is tolerable.  Sees wound care every 2 weeks and dresses her own wounds bid with nonadherent dressing.    ROS: All other systems negative except as listed above.      Current Outpatient Medications:      aspirin 81 mg chewable tablet, Take 81 mg by mouth every evening , Disp: , Rfl:      buPROPion (WELLBUTRIN SR) 150 MG 12 hr tablet, Take 150 mg by mouth every morning , Disp: , Rfl:      cholecalciferol, vitamin D3, 1,000 unit tablet, [CHOLECALCIFEROL, VITAMIN D3, 1,000 UNIT TABLET] Take 2,000 Units by mouth daily., Disp: , Rfl:      citalopram (CELEXA) 40 MG tablet, Take 40 mg by mouth every morning , Disp: , Rfl:      furosemide (LASIX) 20 MG tablet, Take 20 mg by mouth 2 times daily AM and afternoon (4PM), Disp: , Rfl:       multivitamin therapeutic (THERAGRAN) tablet, [MULTIVITAMIN THERAPEUTIC (THERAGRAN) TABLET] Take 1 tablet by mouth daily., Disp: , Rfl:      multivitamin w/minerals (CENTRUM ADULTS) tablet, as directed, Disp: , Rfl:      naproxen sodium (ALEVE) 220 MG tablet, Take 440 mg by mouth daily as needed , Disp: , Rfl:      oxyCODONE (ROXICODONE) 5 MG tablet, Take 1 tablet (5 mg) by mouth every 8 hours as needed for breakthrough pain or severe pain, Disp: 9 tablet, Rfl: 0     pantoprazole (PROTONIX) 40 MG tablet, [PANTOPRAZOLE (PROTONIX) 40 MG TABLET] Take 40 mg by mouth daily., Disp: , Rfl:      potassium chloride ER (KLOR-CON M) 20 MEQ CR tablet, Take 20 mEq by mouth every evening, Disp: , Rfl:      potassium chloride SA (K-DUR,KLOR-CON) 20 MEQ tablet, Take 40 mEq by mouth every morning , Disp: , Rfl:      rOPINIRole (REQUIP) 1 MG tablet, Take 1 mg by mouth At Bedtime, Disp: , Rfl:      rOPINIRole (REQUIP) 1 MG tablet, Take 0.5 mg by mouth 2 times daily AM and afternoon (Patient not taking: Reported on 9/10/2021), Disp: , Rfl:      simvastatin (ZOCOR) 40 MG tablet, [SIMVASTATIN (ZOCOR) 40 MG TABLET] Take 40 mg by mouth bedtime., Disp: , Rfl:      spironolactone (ALDACTONE) 25 MG tablet, [SPIRONOLACTONE (ALDACTONE) 25 MG TABLET] Take 25 mg by mouth daily., Disp: , Rfl:       OBJECTIVE:  /78   Pulse 72   Temp 98.9  F (37.2  C)   Wt 113.4 kg (250 lb)   BMI 47.24 kg/m        GEN: No acute distress.    RESPIRATORY:  Normal breathing pattern.   EXTREMITIES: chronic lymphedema  SKIN/HAIR/NAILS: open ulcerations with serpiginous borders, considerable slough. No undermining, no fluctuance or tunneling. Open ulcerations are tender    Picture from previous visit:       New Pictures from 9/30/21:  Anterior view, right leg      Medial view, right leg      Lateral view, right leg              Pertinent labs:    CRP   Date Value Ref Range Status   09/28/2021 2.9 (H) 0.0-<0.8 mg/dL Final     Last Comprehensive Metabolic  Panel:  Sodium   Date Value Ref Range Status   08/02/2021 140 136 - 145 mmol/L Final     Potassium   Date Value Ref Range Status   08/02/2021 4.9 3.5 - 5.0 mmol/L Final     Chloride   Date Value Ref Range Status   08/02/2021 103 98 - 107 mmol/L Final     Carbon Dioxide (CO2)   Date Value Ref Range Status   08/02/2021 24 22 - 31 mmol/L Final     Anion Gap   Date Value Ref Range Status   08/02/2021 13 5 - 18 mmol/L Final     Glucose   Date Value Ref Range Status   08/02/2021 83 70 - 125 mg/dL Final     Urea Nitrogen   Date Value Ref Range Status   09/28/2021 21 8 - 28 mg/dL Final     Creatinine   Date Value Ref Range Status   09/28/2021 0.80 0.60 - 1.10 mg/dL Final     GFR Estimate   Date Value Ref Range Status   09/28/2021 73 >60 mL/min/1.73m2 Final     Comment:     As of July 11, 2021, eGFR is calculated by the CKD-EPI creatinine equation, without race adjustment. eGFR can be influenced by muscle mass, exercise, and diet. The reported eGFR is an estimation only and is only applicable if the renal function is stable.   09/26/2020 >60 >60 mL/min/1.73m2 Final     Calcium   Date Value Ref Range Status   08/02/2021 10.0 8.5 - 10.5 mg/dL Final     CBC RESULTS:   Recent Labs   Lab Test 09/07/21  1000   WBC 9.4   RBC 4.12   HGB 10.9*   HCT 35.0   MCV 85   MCH 26.5   MCHC 31.1*   RDW 14.6          MICROBIOLOGY DATA:    T Cell Subset:  WBC Count   Date Value Ref Range Status   09/28/2021 10.5 4.0 - 11.0 10e3/uL Final     % Lymphocytes   Date Value Ref Range Status   09/28/2021 14 % Final       HIV-1 RNA Quantitative:  [facility]: [facility]     RADIOLOGY:    No results found for this or any previous visit (from the past 744 hour(s)).     Total time preparing to see this patient, face-to-face time, and coordinating care time on the same calendar date:35 minutes.  Face-face time: 19 minutes.  Over 50% of face-to-face time was spent in counseling/coordination of care.

## 2021-10-04 ENCOUNTER — TELEPHONE (OUTPATIENT)
Dept: INFECTIOUS DISEASES | Facility: CLINIC | Age: 76
End: 2021-10-04

## 2021-10-04 ENCOUNTER — TELEPHONE (OUTPATIENT)
Dept: VASCULAR SURGERY | Facility: CLINIC | Age: 76
End: 2021-10-04

## 2021-10-04 ENCOUNTER — HOME INFUSION (PRE-WILLOW HOME INFUSION) (OUTPATIENT)
Dept: PHARMACY | Facility: CLINIC | Age: 76
End: 2021-10-04

## 2021-10-04 NOTE — TELEPHONE ENCOUNTER
Umair called to inquire about the status of the STD paperwork that was sent 9/16. Please call her back to discuss.

## 2021-10-04 NOTE — TELEPHONE ENCOUNTER
Attempted to call Umair at [facility] back but was on hold for over 8 minutes so unable to speak with Umair. Called Elizabeth and she thinks that the disability paperwork just need to be refaxed that was dated 8/24/21 and goes through 9/20. This was refaxed and a note included for Umair to contact the office if anything else is needed. Unsure if date needs to be addended to reflect through 10/8 which is next follow up. Next follow up with Staci Junior CNP is 10/8/21.

## 2021-10-04 NOTE — TELEPHONE ENCOUNTER
ID TEAM    [employer] which is processing pt's FMLA and STD claim calling.     please call them back @ [phone]    They sent paperwork for us to fill out. Checking on status.    needs form filled out and OFV, and information if patient can return to work.

## 2021-10-04 NOTE — TELEPHONE ENCOUNTER
Forms were being filed by wound care clinic previously. ID has not received any new forms/requests.

## 2021-10-04 NOTE — TELEPHONE ENCOUNTER
Pt called at 1043 to get an update regharding the STD paperwork. Please call her to f/u [phone].

## 2021-10-04 NOTE — TELEPHONE ENCOUNTER
"It was scanned on 8/13 as a \"PLAN OF CARE\" document. Please print, addend and resend. Thank you!  "

## 2021-10-05 ENCOUNTER — HOME INFUSION (PRE-WILLOW HOME INFUSION) (OUTPATIENT)
Dept: PHARMACY | Facility: CLINIC | Age: 76
End: 2021-10-05

## 2021-10-05 ENCOUNTER — LAB REQUISITION (OUTPATIENT)
Dept: LAB | Facility: CLINIC | Age: 76
End: 2021-10-05
Payer: COMMERCIAL

## 2021-10-05 DIAGNOSIS — L03.115 CELLULITIS OF RIGHT LOWER LIMB: ICD-10-CM

## 2021-10-05 LAB — VANCOMYCIN SERPL-MCNC: 14.2 MG/L

## 2021-10-05 PROCEDURE — 80202 ASSAY OF VANCOMYCIN: CPT | Performed by: INTERNAL MEDICINE

## 2021-10-05 NOTE — PROGRESS NOTES
This is a recent snapshot of the patient's [facility] medical record.  For current drug dose and complete information and questions, call [phone] or In Basket pool, fv home infusion ([id number])  CSN Number:  [id number]

## 2021-10-05 NOTE — TELEPHONE ENCOUNTER
Patient is calling stating that the dates need to be changed 8/6-11/6. She also needs the papers to states she is still in treatment with a pick in her arm and a wound from her knee to ankle.

## 2021-10-06 ENCOUNTER — HOME INFUSION (PRE-WILLOW HOME INFUSION) (OUTPATIENT)
Dept: PHARMACY | Facility: CLINIC | Age: 76
End: 2021-10-06

## 2021-10-06 ENCOUNTER — LAB REQUISITION (OUTPATIENT)
Dept: LAB | Facility: CLINIC | Age: 76
End: 2021-10-06
Payer: COMMERCIAL

## 2021-10-06 DIAGNOSIS — L03.115 CELLULITIS OF RIGHT LOWER LIMB: ICD-10-CM

## 2021-10-06 LAB
AST SERPL W P-5'-P-CCNC: 17 U/L (ref 0–40)
BASOPHILS # BLD AUTO: 0.1 10E3/UL (ref 0–0.2)
BASOPHILS NFR BLD AUTO: 1 %
BUN SERPL-MCNC: 22 MG/DL (ref 8–28)
C REACTIVE PROTEIN LHE: 4.2 MG/DL (ref 0–0.8)
CREAT SERPL-MCNC: 0.77 MG/DL (ref 0.6–1.1)
EOSINOPHIL # BLD AUTO: 0.3 10E3/UL (ref 0–0.7)
EOSINOPHIL NFR BLD AUTO: 4 %
ERYTHROCYTE [DISTWIDTH] IN BLOOD BY AUTOMATED COUNT: 14.6 % (ref 10–15)
ERYTHROCYTE [SEDIMENTATION RATE] IN BLOOD BY WESTERGREN METHOD: 71 MM/HR (ref 0–20)
GFR SERPL CREATININE-BSD FRML MDRD: 76 ML/MIN/1.73M2
HCT VFR BLD AUTO: 33.3 % (ref 35–47)
HGB BLD-MCNC: 11 G/DL (ref 11.7–15.7)
IMM GRANULOCYTES # BLD: 0 10E3/UL
IMM GRANULOCYTES NFR BLD: 0 %
LYMPHOCYTES # BLD AUTO: 1.4 10E3/UL (ref 0.8–5.3)
LYMPHOCYTES NFR BLD AUTO: 18 %
MCH RBC QN AUTO: 26.7 PG (ref 26.5–33)
MCHC RBC AUTO-ENTMCNC: 33 G/DL (ref 31.5–36.5)
MCV RBC AUTO: 81 FL (ref 78–100)
MONOCYTES # BLD AUTO: 1 10E3/UL (ref 0–1.3)
MONOCYTES NFR BLD AUTO: 12 %
NEUTROPHILS # BLD AUTO: 5.2 10E3/UL (ref 1.6–8.3)
NEUTROPHILS NFR BLD AUTO: 65 %
NRBC # BLD AUTO: 0 10E3/UL
NRBC BLD AUTO-RTO: 0 /100
PLATELET # BLD AUTO: 354 10E3/UL (ref 150–450)
RBC # BLD AUTO: 4.12 10E6/UL (ref 3.8–5.2)
WBC # BLD AUTO: 8 10E3/UL (ref 4–11)

## 2021-10-06 PROCEDURE — 84520 ASSAY OF UREA NITROGEN: CPT | Performed by: INTERNAL MEDICINE

## 2021-10-06 PROCEDURE — 86141 C-REACTIVE PROTEIN HS: CPT | Performed by: INTERNAL MEDICINE

## 2021-10-06 PROCEDURE — 85652 RBC SED RATE AUTOMATED: CPT | Performed by: INTERNAL MEDICINE

## 2021-10-06 PROCEDURE — 82565 ASSAY OF CREATININE: CPT | Performed by: INTERNAL MEDICINE

## 2021-10-06 PROCEDURE — 84450 TRANSFERASE (AST) (SGOT): CPT | Performed by: INTERNAL MEDICINE

## 2021-10-06 PROCEDURE — 85025 COMPLETE CBC W/AUTO DIFF WBC: CPT | Performed by: INTERNAL MEDICINE

## 2021-10-06 NOTE — TELEPHONE ENCOUNTER
Spoke with Elizabeth. Let her know that the paperwork will be addended to reflect current plan of care continuing and leave through October 8th, 2021 when she will follow up with Staci Junior CNP. New paperwork has been received from [employer] and this will be filled out at that time. Aicha Ruiz MA has paperwork. Carolina is understanding.     Faxed paperwork to [employer]. Received fax confirmation.

## 2021-10-07 ENCOUNTER — HOME INFUSION (PRE-WILLOW HOME INFUSION) (OUTPATIENT)
Dept: PHARMACY | Facility: CLINIC | Age: 76
End: 2021-10-07

## 2021-10-07 NOTE — PROGRESS NOTES
This is a recent snapshot of the patient's [facility] medical record.  For current drug dose and complete information and questions, call [phone] or In Basket pool, [facility] home infusion ([id number])  CSN Number:  [id number]

## 2021-10-08 ENCOUNTER — OFFICE VISIT (OUTPATIENT)
Dept: VASCULAR SURGERY | Facility: CLINIC | Age: 76
End: 2021-10-08
Attending: NURSE PRACTITIONER
Payer: COMMERCIAL

## 2021-10-08 VITALS — HEART RATE: 84 BPM | DIASTOLIC BLOOD PRESSURE: 72 MMHG | TEMPERATURE: 98.2 F | SYSTOLIC BLOOD PRESSURE: 126 MMHG

## 2021-10-08 DIAGNOSIS — L90.5 SCAR CONDITION AND FIBROSIS OF SKIN: ICD-10-CM

## 2021-10-08 DIAGNOSIS — L08.9 RECURRENT INFECTION OF SKIN: ICD-10-CM

## 2021-10-08 DIAGNOSIS — I89.0 ACQUIRED LYMPHEDEMA OF LEG: ICD-10-CM

## 2021-10-08 DIAGNOSIS — E66.01 MORBID OBESITY WITH BMI OF 50.0-59.9, ADULT (H): ICD-10-CM

## 2021-10-08 DIAGNOSIS — R09.89 OTHER SPECIFIED SYMPTOMS AND SIGNS INVOLVING THE CIRCULATORY AND RESPIRATORY SYSTEMS: ICD-10-CM

## 2021-10-08 DIAGNOSIS — I87.303 VENOUS HYPERTENSION OF LOWER EXTREMITY, BILATERAL: ICD-10-CM

## 2021-10-08 DIAGNOSIS — L97.912 CHRONIC ULCER OF RIGHT LEG, WITH FAT LAYER EXPOSED (H): Primary | ICD-10-CM

## 2021-10-08 DIAGNOSIS — E66.01 MORBID OBESITY (H): ICD-10-CM

## 2021-10-08 DIAGNOSIS — I87.2 VENOUS INSUFFICIENCY OF BOTH LOWER EXTREMITIES: ICD-10-CM

## 2021-10-08 PROCEDURE — 11045 DBRDMT SUBQ TISS EACH ADDL: CPT | Performed by: NURSE PRACTITIONER

## 2021-10-08 PROCEDURE — 11044 DBRDMT BONE 1ST 20 SQ CM/<: CPT | Performed by: NURSE PRACTITIONER

## 2021-10-08 PROCEDURE — 11042 DBRDMT SUBQ TIS 1ST 20SQCM/<: CPT | Performed by: NURSE PRACTITIONER

## 2021-10-08 PROCEDURE — 11045 DBRDMT SUBQ TISS EACH ADDL: CPT | Mod: 59 | Performed by: NURSE PRACTITIONER

## 2021-10-08 RX ORDER — HYDROCODONE BITARTRATE AND ACETAMINOPHEN 5; 325 MG/1; MG/1
TABLET ORAL
Status: ON HOLD | COMMUNITY
Start: 2021-10-04 | End: 2022-04-22

## 2021-10-08 NOTE — PATIENT INSTRUCTIONS
"We will be contacting your PCP to discuss pain medications options    We will order ANSHUL study to check the arterial blood flow in the legs and ensure this is not why you are having so much pain    We will order Venous insufficiency ultrasound to see if the procedure you had done in the past is still closed    I will be reaching out to lymphedema therapy to discuss adding foam wrapping to your regimen; without adequate compression your wounds will not heal and the infection will not heal              Wound Care Instructions    TWICE PER DAY and as needed, Cleanse your right leg wound(s) with Dilute hibiclens 30cc in 500cc NS. OK to rinse with saline after     Pat Dry with non-sterile gauze    Apply Vaseline or zinc oxide to the skin surrounding the wounds to protect from drainage    Primary Dressing: Apply sorbact into/onto the wounds    Secondary dressing: Cover with ABD or baby diapers    Secure with non-sterile roll gauze (4\" x 75\" roll) and tape (1\" roll tape) as needed; avoid adhesive directly on the skin    Compression: tubular compression and short stretch    Begin using the lymphedema pump on the left leg and abdomen    Elevate the legs throughout the day    It is not ok to get your wound wet in the bath or shower    SEEK MEDICAL CARE IF:    You have an increase in swelling, pain, or redness around the wound.    You have an increase in the amount of pus coming from the wound.    There is a bad smell coming from the wound.    The wound appears to be worsening/enlarging    You have a fever greater than 101.5 F      It is ok to continue current wound care treatment/products for the next 2-3 days until new wound care supplies are ordered and arrive. If longer than this please contact our office at [phone].    "

## 2021-10-11 ENCOUNTER — TELEPHONE (OUTPATIENT)
Dept: VASCULAR SURGERY | Facility: CLINIC | Age: 76
End: 2021-10-11

## 2021-10-11 ENCOUNTER — LAB REQUISITION (OUTPATIENT)
Dept: LAB | Facility: CLINIC | Age: 76
End: 2021-10-11
Payer: COMMERCIAL

## 2021-10-11 DIAGNOSIS — L03.115 CELLULITIS OF RIGHT LOWER LIMB: ICD-10-CM

## 2021-10-11 LAB
AST SERPL W P-5'-P-CCNC: 16 U/L (ref 0–40)
BASOPHILS # BLD AUTO: 0.1 10E3/UL (ref 0–0.2)
BASOPHILS NFR BLD AUTO: 1 %
BUN SERPL-MCNC: 24 MG/DL (ref 8–28)
C REACTIVE PROTEIN LHE: 2.8 MG/DL (ref 0–0.8)
CREAT SERPL-MCNC: 0.82 MG/DL (ref 0.6–1.1)
EOSINOPHIL # BLD AUTO: 0.3 10E3/UL (ref 0–0.7)
EOSINOPHIL NFR BLD AUTO: 3 %
ERYTHROCYTE [DISTWIDTH] IN BLOOD BY AUTOMATED COUNT: 14.9 % (ref 10–15)
ERYTHROCYTE [SEDIMENTATION RATE] IN BLOOD BY WESTERGREN METHOD: 64 MM/HR (ref 0–20)
GFR SERPL CREATININE-BSD FRML MDRD: 71 ML/MIN/1.73M2
HCT VFR BLD AUTO: 35.1 % (ref 35–47)
HGB BLD-MCNC: 11 G/DL (ref 11.7–15.7)
IMM GRANULOCYTES # BLD: 0 10E3/UL
IMM GRANULOCYTES NFR BLD: 0 %
LYMPHOCYTES # BLD AUTO: 1.1 10E3/UL (ref 0.8–5.3)
LYMPHOCYTES NFR BLD AUTO: 12 %
MCH RBC QN AUTO: 25.9 PG (ref 26.5–33)
MCHC RBC AUTO-ENTMCNC: 31.3 G/DL (ref 31.5–36.5)
MCV RBC AUTO: 83 FL (ref 78–100)
MONOCYTES # BLD AUTO: 1 10E3/UL (ref 0–1.3)
MONOCYTES NFR BLD AUTO: 11 %
NEUTROPHILS # BLD AUTO: 6.8 10E3/UL (ref 1.6–8.3)
NEUTROPHILS NFR BLD AUTO: 73 %
NRBC # BLD AUTO: 0 10E3/UL
NRBC BLD AUTO-RTO: 0 /100
PLATELET # BLD AUTO: 375 10E3/UL (ref 150–450)
RBC # BLD AUTO: 4.25 10E6/UL (ref 3.8–5.2)
VANCOMYCIN SERPL-MCNC: 17.3 MG/L
WBC # BLD AUTO: 9.4 10E3/UL (ref 4–11)

## 2021-10-11 PROCEDURE — 84520 ASSAY OF UREA NITROGEN: CPT | Performed by: INTERNAL MEDICINE

## 2021-10-11 PROCEDURE — 85025 COMPLETE CBC W/AUTO DIFF WBC: CPT | Performed by: INTERNAL MEDICINE

## 2021-10-11 PROCEDURE — 80202 ASSAY OF VANCOMYCIN: CPT | Performed by: INTERNAL MEDICINE

## 2021-10-11 PROCEDURE — 85652 RBC SED RATE AUTOMATED: CPT | Performed by: INTERNAL MEDICINE

## 2021-10-11 PROCEDURE — 82565 ASSAY OF CREATININE: CPT | Performed by: INTERNAL MEDICINE

## 2021-10-11 PROCEDURE — 84450 TRANSFERASE (AST) (SGOT): CPT | Performed by: INTERNAL MEDICINE

## 2021-10-11 PROCEDURE — 86141 C-REACTIVE PROTEIN HS: CPT | Performed by: INTERNAL MEDICINE

## 2021-10-11 NOTE — TELEPHONE ENCOUNTER
----- Message from Staci Junior NP sent at 10/8/2021  4:45 PM CDT -----  Regarding: pain medication and edema  Can you please contact the PCP to consider adding lyrica or gabapentin to this patient's pain medication regimen; she is having severe burning pain in the right leg. Also would they consider increasing her diuretics for a short course to help with the edema and wound drainage    Staci Junior DNP, RN, CNP, CWOCN, CFCN, CLT  [facility]  [phone]

## 2021-10-12 ENCOUNTER — HOME INFUSION (PRE-WILLOW HOME INFUSION) (OUTPATIENT)
Dept: PHARMACY | Facility: CLINIC | Age: 76
End: 2021-10-12

## 2021-10-13 ENCOUNTER — APPOINTMENT (OUTPATIENT)
Dept: RADIOLOGY | Facility: HOSPITAL | Age: 76
End: 2021-10-13
Attending: STUDENT IN AN ORGANIZED HEALTH CARE EDUCATION/TRAINING PROGRAM
Payer: COMMERCIAL

## 2021-10-13 ENCOUNTER — HOME INFUSION (PRE-WILLOW HOME INFUSION) (OUTPATIENT)
Dept: PHARMACY | Facility: CLINIC | Age: 76
End: 2021-10-13
Payer: COMMERCIAL

## 2021-10-13 ENCOUNTER — HOSPITAL ENCOUNTER (EMERGENCY)
Facility: HOSPITAL | Age: 76
Discharge: HOME | End: 2021-10-13
Attending: EMERGENCY MEDICINE | Admitting: EMERGENCY MEDICINE
Payer: COMMERCIAL

## 2021-10-13 VITALS
RESPIRATION RATE: 18 BRPM | OXYGEN SATURATION: 98 % | TEMPERATURE: 98.7 F | DIASTOLIC BLOOD PRESSURE: 63 MMHG | BODY MASS INDEX: 47.99 KG/M2 | HEART RATE: 75 BPM | WEIGHT: 254 LBS | SYSTOLIC BLOOD PRESSURE: 129 MMHG

## 2021-10-13 DIAGNOSIS — L03.115 CELLULITIS OF RIGHT LOWER EXTREMITY: Primary | ICD-10-CM

## 2021-10-13 DIAGNOSIS — Z95.828 S/P PICC CENTRAL LINE PLACEMENT: ICD-10-CM

## 2021-10-13 LAB
ANION GAP SERPL CALCULATED.3IONS-SCNC: 7 MMOL/L (ref 5–18)
BASOPHILS # BLD AUTO: 0.1 10E3/UL (ref 0–0.2)
BASOPHILS NFR BLD AUTO: 1 %
BUN SERPL-MCNC: 19 MG/DL (ref 8–28)
CALCIUM SERPL-MCNC: 9.7 MG/DL (ref 8.5–10.5)
CHLORIDE BLD-SCNC: 104 MMOL/L (ref 98–107)
CO2 SERPL-SCNC: 30 MMOL/L (ref 22–31)
CREAT SERPL-MCNC: 0.83 MG/DL (ref 0.6–1.1)
EOSINOPHIL # BLD AUTO: 0.3 10E3/UL (ref 0–0.7)
EOSINOPHIL NFR BLD AUTO: 3 %
ERYTHROCYTE [DISTWIDTH] IN BLOOD BY AUTOMATED COUNT: 14.7 % (ref 10–15)
GFR SERPL CREATININE-BSD FRML MDRD: 70 ML/MIN/1.73M2
GLUCOSE BLD-MCNC: 87 MG/DL (ref 70–125)
HCT VFR BLD AUTO: 35.5 % (ref 35–47)
HGB BLD-MCNC: 10.9 G/DL (ref 11.7–15.7)
IMM GRANULOCYTES # BLD: 0.1 10E3/UL
IMM GRANULOCYTES NFR BLD: 1 %
LYMPHOCYTES # BLD AUTO: 1.5 10E3/UL (ref 0.8–5.3)
LYMPHOCYTES NFR BLD AUTO: 16 %
MCH RBC QN AUTO: 26 PG (ref 26.5–33)
MCHC RBC AUTO-ENTMCNC: 30.7 G/DL (ref 31.5–36.5)
MCV RBC AUTO: 85 FL (ref 78–100)
MONOCYTES # BLD AUTO: 0.9 10E3/UL (ref 0–1.3)
MONOCYTES NFR BLD AUTO: 10 %
NEUTROPHILS # BLD AUTO: 6.6 10E3/UL (ref 1.6–8.3)
NEUTROPHILS NFR BLD AUTO: 69 %
NRBC # BLD AUTO: 0 10E3/UL
NRBC BLD AUTO-RTO: 0 /100
PLATELET # BLD AUTO: 363 10E3/UL (ref 150–450)
POTASSIUM BLD-SCNC: 4.7 MMOL/L (ref 3.5–5)
RBC # BLD AUTO: 4.19 10E6/UL (ref 3.8–5.2)
SODIUM SERPL-SCNC: 141 MMOL/L (ref 136–145)
WBC # BLD AUTO: 9.3 10E3/UL (ref 4–11)

## 2021-10-13 PROCEDURE — 99285 EMERGENCY DEPT VISIT HI MDM: CPT | Mod: 25

## 2021-10-13 PROCEDURE — 85004 AUTOMATED DIFF WBC COUNT: CPT | Performed by: EMERGENCY MEDICINE

## 2021-10-13 PROCEDURE — 36415 COLL VENOUS BLD VENIPUNCTURE: CPT | Performed by: EMERGENCY MEDICINE

## 2021-10-13 PROCEDURE — 272N000450 HC KIT 4FR POWER PICC SINGLE LUMEN

## 2021-10-13 PROCEDURE — 80048 BASIC METABOLIC PNL TOTAL CA: CPT | Performed by: EMERGENCY MEDICINE

## 2021-10-13 PROCEDURE — 36569 INSJ PICC 5 YR+ W/O IMAGING: CPT

## 2021-10-13 PROCEDURE — 71046 X-RAY EXAM CHEST 2 VIEWS: CPT | Mod: XU

## 2021-10-13 PROCEDURE — 250N000009 HC RX 250: Performed by: EMERGENCY MEDICINE

## 2021-10-13 RX ORDER — LIDOCAINE 40 MG/G
CREAM TOPICAL
Status: DISCONTINUED | OUTPATIENT
Start: 2021-10-13 | End: 2021-10-13 | Stop reason: HOSPADM

## 2021-10-13 RX ADMIN — LIDOCAINE HYDROCHLORIDE 3 ML: 10 INJECTION, SOLUTION EPIDURAL; INFILTRATION; INTRACAUDAL; PERINEURAL at 14:45

## 2021-10-13 NOTE — PROCEDURES
"Procedures PICC Line Insertion Procedure Note  Pt. Name: Elizabeth Kelley  MRN:        [id number]    Procedure: Insertion of a  single Lumen  4 fr  Bard SOLO (valved) Power PICC, Lot number [id number]    Indications: antibiotics    Contraindications : none noteed    Procedure Details   Patient identified with 2 identifiers and \"Time Out\" conducted.  .     Central line insertion bundle followed: hand hygeine performed prior to procedure, site cleansed with cholraprep, hat, mask, sterile gloves,sterile gown worn, patient draped with maximum barrier head to toe drape, sterile field maintained.    The vein was assessed and found to be compressible and of adequate size. 3 ml 1% Lidocaine administered sq to the insertion site. A 4 Fr PICC was inserted into the basilic vein of the left arm with ultrasound guidance. One attempt(s) required to access vein.   Catheter threaded without difficulty. Good blood return noted.    Modified Seldinger Technique used for insertion.    The 8 sharps that are included in the PICC insertion kit were accounted for and disposed of in the sharps container prior to breakdown of the sterile field.    Catheter secured with Statlock, biopatch and Tegaderm dressing applied.    Findings:  Total catheter length  41 cm, with 0 cm exposed. Mid upper arm circumference is 43 cm. Catheter was flushed with 10 cc NS. Patient  tolerated procedure well.    Tip placement verified by 3cg tip location technology . Tip placement in the SVC.    CLABSI prevention brochure left at bedside.    Patient's primary RN notified PICC is ready for use.    Comments:  Patients right arm from previous picc reddened, excoriated, and itching.  This is the 4th picc line for Elizabeth.              @[email or web address]@,BSN  [facility]          "

## 2021-10-13 NOTE — ED PROVIDER NOTES
EMERGENCY DEPARTMENT ENCOUNTER      NAME: Elizabeth Kelley  AGE: 76 year old female  YOB: 1945  MRN: [id number]  EVALUATION DATE & TIME: 10/13/2021 12:40 PM    PCP: Francisco Ramirez    ED PROVIDER: Ajay Moser M.D.      Chief Complaint   Patient presents with     Vascular Access Problem         FINAL IMPRESSION:  Agee replacement      ED COURSE & MEDICAL DECISION MAKING:    Pertinent Labs & Imaging studies reviewed. (See chart for details)  76 year old female presents to the Emergency Department for evaluation of place PICC line.  Patient with PICC line since early August due to cellulitis of the right lower extremity requiring intravenous antibiotics.  Patient noted PICC line seem to be extended further today was unable to flush the line.  Portable chest x-ray obtained from triage reveals tip of PICC line now in the lateral subclavian area.  Plans will be made for replacement of PICC line.  On exam she is noted to have marked erythema and scale to the biceps area on the right.  She attributes this to sensitivity to the tape.  Heart and lungs unremarkable.. Patient appears non toxic with stable vitals signs. Overall exam is benign.        1:03 PM I met with the patient for the initial interview and physical examination. Discussed plan for treatment and workup in the ED.    1:21 PM Spoke to Dr. Vargas, infectious disease.  Recommends hypoallergenic dressing.  3:30 PM.  She reports successful placement of a line.  Patient dismissed.  At the conclusion of the encounter I discussed the results of all of the tests and the disposition. The questions were answered and return precautions provided. The patient or family acknowledged understanding and was agreeable with the care plan.       PPE: Provider wore gloves, N95 mask, eye protection, surgical cap, and paper mask.     MEDICATIONS GIVEN IN THE EMERGENCY:  Medications - No data to display    NEW PRESCRIPTIONS STARTED AT TODAY'S ER VISIT  New  Prescriptions    No medications on file          =================================================================    HPI    Patient information was obtained from: patient     Use of Intrepreter: N/A         Elizabeth Kelley is a 76 year old female with a pertient medical history of PICC line in place, MS, GERD, hypertension, hyperlipidemia, cellulitis who presents to the ED for evaluation of vascular acess problem.    Patient reports that her PICC line had came out and has been difficult to flush. She has redness surrounding the area because of her allergy to tape. She usually uses her PICC line for antibiotics. She has not gotten her morning dose today.     She has had the PICC line in place since 8/6/21 and is unsure how long she is going to have it. She says that it has came undone and had to be replaced multiple times. It was last replaced a month ago. She does have a dressing on her left leg that is being treated for an infection. She denies any other associated symptoms at this time.       REVIEW OF SYSTEMS   Constitutional:  Denies fever, chills  Respiratory:  Denies productive cough or increased work of breathing  Cardiovascular:  Denies chest pain, palpitations  GI:  Denies abdominal pain, nausea, vomiting, or change in bowel or bladder habits   Musculoskeletal:  Denies any new muscle/joint swelling  Skin:  Positive for redness surrounding left upper arm.   Neurologic:  Denies focal weakness  All systems negative except as marked.     PAST MEDICAL HISTORY:  Past Medical History:   Diagnosis Date     Ankle contracture, left      Class 3 severe obesity due to excess calories without serious comorbidity with body mass index (BMI) of 45.0 to 49.9 in adult (H)      Combined gastric and duodenal ulcer      Depression      Dyslipidemia      Edema      Gait abnormality      GERD (gastroesophageal reflux disease)      Lymphedema of both lower extremities      Melanoma (H)     left upper arm     MS (multiple  sclerosis) (H)      RLS (restless legs syndrome)      Skin ulcer of left lower leg (H)      Valgus deformity of both feet      Vitamin D deficiency        PAST SURGICAL HISTORY:  Past Surgical History:   Procedure Laterality Date     ABLATION SAPHENOUS VEIN W/ RFA Right 04/12/2021    Saphenous vein, anterior accessory saphenous vein, sclerotherapy of multiple veins.     COSMETIC SURGERY  1988    reduction of excess skin from weight loss     ESOPHAGOSCOPY, GASTROSCOPY, DUODENOSCOPY (EGD), COMBINED N/A 03/30/2015    Procedure: UPPER ENDOSCOPY with gastric biopsy;  Surgeon: Checo Fletcher MD;  Location: [facility];  Service:      MAMMOPLASTY REDUCTION Bilateral      MOHS MICROGRAPHIC PROCEDURE      left upper arm, melanoma     PICC SINGLE LUMEN PLACEMENT  8/13/2021          PICC SINGLE LUMEN PLACEMENT  9/2/2021          SPINE SURGERY      L5 area surgery, decompression         CURRENT MEDICATIONS:    No current facility-administered medications for this encounter.    Current Outpatient Medications:      aspirin 81 mg chewable tablet, Take 81 mg by mouth every evening , Disp: , Rfl:      buPROPion (WELLBUTRIN SR) 150 MG 12 hr tablet, Take 150 mg by mouth every morning , Disp: , Rfl:      cholecalciferol, vitamin D3, 1,000 unit tablet, [CHOLECALCIFEROL, VITAMIN D3, 1,000 UNIT TABLET] Take 2,000 Units by mouth daily., Disp: , Rfl:      citalopram (CELEXA) 40 MG tablet, Take 40 mg by mouth every morning , Disp: , Rfl:      furosemide (LASIX) 20 MG tablet, Take 20 mg by mouth 2 times daily AM and afternoon (4PM), Disp: , Rfl:      HYDROcodone-acetaminophen (NORCO) 5-325 MG tablet, TAKE 1 TABLET BY MOUTH EVERY 8 HOURS AS NEEDED FOR 4 DAYS, Disp: , Rfl:      multivitamin therapeutic (THERAGRAN) tablet, [MULTIVITAMIN THERAPEUTIC (THERAGRAN) TABLET] Take 1 tablet by mouth daily., Disp: , Rfl:      multivitamin w/minerals (CENTRUM ADULTS) tablet, as directed, Disp: , Rfl:      naproxen sodium (ALEVE) 220 MG tablet, Take  440 mg by mouth daily as needed , Disp: , Rfl:      oxyCODONE (ROXICODONE) 5 MG tablet, Take 1 tablet (5 mg) by mouth every 8 hours as needed for breakthrough pain or severe pain, Disp: 9 tablet, Rfl: 0     pantoprazole (PROTONIX) 40 MG tablet, [PANTOPRAZOLE (PROTONIX) 40 MG TABLET] Take 40 mg by mouth daily., Disp: , Rfl:      potassium chloride ER (KLOR-CON M) 20 MEQ CR tablet, Take 20 mEq by mouth every evening, Disp: , Rfl:      potassium chloride SA (K-DUR,KLOR-CON) 20 MEQ tablet, Take 40 mEq by mouth every morning , Disp: , Rfl:      rOPINIRole (REQUIP) 1 MG tablet, Take 1 mg by mouth At Bedtime, Disp: , Rfl:      simvastatin (ZOCOR) 40 MG tablet, [SIMVASTATIN (ZOCOR) 40 MG TABLET] Take 40 mg by mouth bedtime., Disp: , Rfl:      spironolactone (ALDACTONE) 25 MG tablet, [SPIRONOLACTONE (ALDACTONE) 25 MG TABLET] Take 25 mg by mouth daily., Disp: , Rfl:      VANCOMYCIN HCL PO, 4 times daily, Disp: , Rfl:     ALLERGIES:  Allergies   Allergen Reactions     Other Environmental Allergy Anaphylaxis     Bees/Wasps Stings     Adhesive Tape Unknown     Adhesive [Mecrylate] Unknown     Other reaction(s): sores     Vicodin [Hydrocodone-Acetaminophen] Nausea and Vomiting and Hives       FAMILY HISTORY:  Family History   Problem Relation Age of Onset     Uterine Cancer Mother      Hyperlipidemia Mother      Hypertension Mother      Multiple Sclerosis Mother      Asthma Sister      Obesity Sister      Hyperlipidemia Sister      Hypertension Sister      Multiple Sclerosis Sister      Arthritis Sister      Colon Cancer Paternal Aunt      Breast Cancer Paternal Aunt      Hypertension Paternal Aunt      Hyperlipidemia Paternal Aunt      Brain Cancer Father      Arthritis Maternal Uncle      Arthritis Maternal Grandmother      Early Death Maternal Grandfather      Arthritis Paternal Grandmother      Arthritis Paternal Grandfather        SOCIAL HISTORY:   Social History     Socioeconomic History     Marital status: Single      Spouse name: Not on file     Number of children: Not on file     Years of education: Not on file     Highest education level: Not on file   Occupational History     Not on file   Tobacco Use     Smoking status: Former Smoker     Packs/day: 0.25     Years: 12.00     Pack years: 3.00     Types: Cigarettes     Quit date: 1975     Years since quittin.8     Smokeless tobacco: Never Used     Tobacco comment: quit more than 30 years ago   Substance and Sexual Activity     Alcohol use: Yes     Alcohol/week: 1.0 standard drinks     Drug use: No     Sexual activity: Yes     Partners: Male     Birth control/protection: Post-menopausal   Other Topics Concern     Not on file   Social History Narrative    .   Has significant other.  2 grown children.  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       Social Determinants of Health     Financial Resource Strain:      Difficulty of Paying Living Expenses:    Food Insecurity:      Worried About Running Out of Food in the Last Year:      Ran Out of Food in the Last Year:    Transportation Needs:      Lack of Transportation (Medical):      Lack of Transportation (Non-Medical):    Physical Activity:      Days of Exercise per Week:      Minutes of Exercise per Session:    Stress:      Feeling of Stress :    Social Connections:      Frequency of Communication with Friends and Family:      Frequency of Social Gatherings with Friends and Family:      Attends Buddhist Services:      Active Member of Clubs or Organizations:      Attends Club or Organization Meetings:      Marital Status:    Intimate Partner Violence:      Fear of Current or Ex-Partner:      Emotionally Abused:      Physically Abused:      Sexually Abused:        VITALS:  Patient Vitals for the past 24 hrs:   BP Temp Temp src Pulse Resp SpO2 Weight   10/13/21 1030 129/63 98.7  F (37.1  C) Tympanic 75 18 98 % 115.2 kg (254 lb)        PHYSICAL EXAM    Constitutional:  Awake, alert, in no apparent distress  HENT:   Normocephalic, Atraumatic. Bilateral external ears normal. Oropharynx moist. Nose normal. Neck- Normal range of motion with no guarding, No midline cervical tenderness, Supple, No stridor.   Eyes:  PERRL, EOMI with no signs of entrapment, Conjunctiva normal, No discharge.   Musculoskeletal:   No edema. Good range of motion in all major joints. No tenderness to palpation or major deformities noted.  Integument:  Warm, Dry, Right erythema over biceps that is non-tender. Right lower leg swelling with dressing in place.  Neurologic:  Alert & oriented, Normal motor function, Normal sensory function, No focal deficits noted.   Psychiatric:  Affect normal, Judgment normal, Mood normal.     LAB:  All pertinent labs reviewed and interpreted.  Results for orders placed or performed during the hospital encounter of 10/13/21   Chest XR,  PA & LAT     Status: None    Narrative    EXAM: XR CHEST 2 VW  LOCATION: [facility]  DATE/TIME: 10/13/2021 12:12 PM    INDICATION: Pelvic ultrasound. Reevaluate.  COMPARISON: 04/09/2015.      Impression    IMPRESSION:     Right PICC tip over the right axilla.    Lungs are clear. Mild right hemidiaphragm eventration. No pleural effusion or pneumothorax. Normal heart size. Mildly tortuous aorta.     Degenerative changes spine.       Basic metabolic panel     Status: Normal   Result Value Ref Range    Sodium 141 136 - 145 mmol/L    Potassium 4.7 3.5 - 5.0 mmol/L    Chloride 104 98 - 107 mmol/L    Carbon Dioxide (CO2) 30 22 - 31 mmol/L    Anion Gap 7 5 - 18 mmol/L    Urea Nitrogen 19 8 - 28 mg/dL    Creatinine 0.83 0.60 - 1.10 mg/dL    Calcium 9.7 8.5 - 10.5 mg/dL    Glucose 87 70 - 125 mg/dL    GFR Estimate 70 >60 mL/min/1.73m2   CBC with platelets and differential     Status: Abnormal   Result Value Ref Range    WBC Count 9.3 4.0 - 11.0 10e3/uL    RBC Count 4.19 3.80 - 5.20 10e6/uL    Hemoglobin 10.9 (L) 11.7 - 15.7 g/dL    Hematocrit 35.5 35.0 - 47.0 %    MCV 85 78 -  100 fL    MCH 26.0 (L) 26.5 - 33.0 pg    MCHC 30.7 (L) 31.5 - 36.5 g/dL    RDW 14.7 10.0 - 15.0 %    Platelet Count 363 150 - 450 10e3/uL    % Neutrophils 69 %    % Lymphocytes 16 %    % Monocytes 10 %    % Eosinophils 3 %    % Basophils 1 %    % Immature Granulocytes 1 %    NRBCs per 100 WBC 0 <1 /100    Absolute Neutrophils 6.6 1.6 - 8.3 10e3/uL    Absolute Lymphocytes 1.5 0.8 - 5.3 10e3/uL    Absolute Monocytes 0.9 0.0 - 1.3 10e3/uL    Absolute Eosinophils 0.3 0.0 - 0.7 10e3/uL    Absolute Basophils 0.1 0.0 - 0.2 10e3/uL    Absolute Immature Granulocytes 0.1 (H) <=0.0 10e3/uL    Absolute NRBCs 0.0 10e3/uL   CBC with platelets + differential     Status: Abnormal    Narrative    The following orders were created for panel order CBC with platelets + differential.  Procedure                               Abnormality         Status                     ---------                               -----------         ------                     CBC with platelets and d...[[id number]]  Abnormal            Final result                 Please view results for these tests on the individual orders.       RADIOLOGY:  Reviewed all pertinent imaging. Please see official radiology report.  Chest XR,  PA & LAT  Chest XR,  PA & LAT    Result Date: 10/13/2021  EXAM: XR CHEST 2 VW LOCATION: [facility] DATE/TIME: 10/13/2021 12:12 PM INDICATION: Pelvic ultrasound. Reevaluate. COMPARISON: 04/09/2015.     IMPRESSION: Right PICC tip over the right axilla. Lungs are clear. Mild right hemidiaphragm eventration. No pleural effusion or pneumothorax. Normal heart size. Mildly tortuous aorta. Degenerative changes spine.      I, Harmony Quinn, am serving as a scribe to document services personally performed by Ajay Moser MD, based on my observation and the provider's statements to me. I, Ajay Moser MD attest that Harmony Quinn is acting in a scribe capacity, has observed my performance of the services and has documented them  in accordance with my direction.    Ajay Moser M.D.  Emergency Medicine  [facility] EMERGENCY DEPARTMENT     Ajay Moser MD  10/13/21 8534

## 2021-10-13 NOTE — ED PROVIDER NOTES
Expected Patient Referral to ED  9:33 AM    Referring Clinic/Provider:  Home infusion    Reason for referral/Clinical facts:  PICC line migrated and is farther out than necessary.  Needs picc in place to receive meropenem.    Recommendations provided:  Patient on way in.    Caller was informed that this institution does  possess the capabilities and/or resources to provide for patient and should be transferred to our institution.    Based on the information provided, discussed that this patient likely is nota good candidate for direct admission to this institution and that provider could proceed as such.  If however direct admit is sought and any hurdles encountered, this ED would be happy to see the patient and evaluate.    Informed caller that recommendations provided are recommendations based only on the facts provided and that they responsible to accept or reject the advice, or to seek a formal in person consultation as needed and that this ED will see/treat patient should they arrive.      Jayden Eisenberg MD  Emergency Medicine  [facility] EMERGENCY DEPARTMENT  [address] [place] [id number]  [phone]       Jayden Eisenberg MD  10/13/21 0934

## 2021-10-13 NOTE — ED PROVIDER NOTES
"This patient was briefly evaluated in triage to initiate work-up.    CC: Dislodged PICC Line on right arm    Brief history and exam findings:  Patient reports that her PICC line on right upper arm has been dislodged since last night and states that she feels as if her PICC line has not been flushing right today and seems to be \"coming out into the arm.\" She states that last dose of antibiotics that was \"able to enter\" was last night. Patient has been using her PICC for antibiotics for the past several months. Patient also notes redness of her right arm near PICC line site and on bilateral upper inner thighs that started two weeks ago around the time she started Vancomycin. Denies any associating pain but notes some itchiness. Patient states that she took Benadryl which provided relief. Patient notes she has not been seen by a provider yet but has been talking with the nurses that come to change her dressing.    Denies any hives, fever, nausea, vomiting, SOB, abdominal pain, dizziness, and lightheadedness. Patient is seen by infectious disease specialist and a lymphedema specialist where she gets a massage and notes that leg infection has been improving.     On exam, patient's PICC line is pulled back approximately 10 cm, surrounding site and involving most of anterior brachium is scaly, blanching erythema which is non-tender. Some scattered excoriations present. Posterior oropharynx widely patent. Lungs clear, no wheezing.    Brief Assessment & Plan:   76 year old female here with complaint of dislodged RUE PICC line since last night. Will likely need replacement. Also with incidental itchy scaly rash surrounding PICC site and inguinal region, since starting vancomycin a couple weeks ago. May represent Red Man's syndrome vs contact dermatitis vs allergic process. Will place calls to PICC RN and ID, and check basic labs.     Update: pt later mentioned itchy, erythematous area corresponded to area of tape placement - " likely 2/2 contact dermatitis over more significant allergic process.    Eve Benjamin MD  Emergency Medicine     Eve Benjamin MD  10/13/21 [id number]

## 2021-10-13 NOTE — ED NOTES
Bed: JNED-07  Expected date: 10/13/21  Expected time:   Means of arrival:   Comments:  RP 1 place PICC

## 2021-10-14 ENCOUNTER — HOSPITAL ENCOUNTER (OUTPATIENT)
Dept: PHYSICAL THERAPY | Facility: REHABILITATION | Age: 76
End: 2021-10-14
Payer: COMMERCIAL

## 2021-10-14 ENCOUNTER — HOME INFUSION (PRE-WILLOW HOME INFUSION) (OUTPATIENT)
Dept: PHARMACY | Facility: CLINIC | Age: 76
End: 2021-10-14

## 2021-10-14 DIAGNOSIS — L97.811 VENOUS STASIS ULCER OF OTHER PART OF RIGHT LOWER LEG LIMITED TO BREAKDOWN OF SKIN WITH VARICOSE VEINS (H): ICD-10-CM

## 2021-10-14 DIAGNOSIS — I83.018 VENOUS STASIS ULCER OF OTHER PART OF RIGHT LOWER LEG LIMITED TO BREAKDOWN OF SKIN WITH VARICOSE VEINS (H): ICD-10-CM

## 2021-10-14 DIAGNOSIS — I89.0 LYMPHEDEMA: Primary | ICD-10-CM

## 2021-10-14 DIAGNOSIS — R22.43 LOCALIZED SWELLING OF BOTH LOWER LEGS: ICD-10-CM

## 2021-10-14 PROCEDURE — 97140 MANUAL THERAPY 1/> REGIONS: CPT | Mod: GP | Performed by: PHYSICAL THERAPY ASSISTANT

## 2021-10-16 ENCOUNTER — HEALTH MAINTENANCE LETTER (OUTPATIENT)
Age: 76
End: 2021-10-16

## 2021-10-18 ENCOUNTER — HOSPITAL ENCOUNTER (OUTPATIENT)
Dept: PHYSICAL THERAPY | Facility: REHABILITATION | Age: 76
End: 2021-10-18
Payer: COMMERCIAL

## 2021-10-18 ENCOUNTER — HOME INFUSION (PRE-WILLOW HOME INFUSION) (OUTPATIENT)
Dept: PHARMACY | Facility: CLINIC | Age: 76
End: 2021-10-18

## 2021-10-18 ENCOUNTER — OFFICE VISIT (OUTPATIENT)
Dept: INFECTIOUS DISEASES | Facility: CLINIC | Age: 76
End: 2021-10-18
Payer: COMMERCIAL

## 2021-10-18 VITALS
WEIGHT: 254 LBS | SYSTOLIC BLOOD PRESSURE: 130 MMHG | HEART RATE: 56 BPM | DIASTOLIC BLOOD PRESSURE: 66 MMHG | BODY MASS INDEX: 47.99 KG/M2 | TEMPERATURE: 98.3 F

## 2021-10-18 DIAGNOSIS — R22.43 LOCALIZED SWELLING OF BOTH LOWER LEGS: ICD-10-CM

## 2021-10-18 DIAGNOSIS — L03.115 CELLULITIS OF RIGHT LOWER EXTREMITY: ICD-10-CM

## 2021-10-18 DIAGNOSIS — B37.89 CANDIDA RASH OF GROIN: ICD-10-CM

## 2021-10-18 DIAGNOSIS — L97.811 VENOUS STASIS ULCER OF OTHER PART OF RIGHT LOWER LEG LIMITED TO BREAKDOWN OF SKIN WITH VARICOSE VEINS (H): ICD-10-CM

## 2021-10-18 DIAGNOSIS — I89.0 LYMPHEDEMA: Primary | ICD-10-CM

## 2021-10-18 DIAGNOSIS — T14.8XXA NON-HEALING NON-SURGICAL WOUND: Primary | ICD-10-CM

## 2021-10-18 DIAGNOSIS — I83.018 VENOUS STASIS ULCER OF OTHER PART OF RIGHT LOWER LEG LIMITED TO BREAKDOWN OF SKIN WITH VARICOSE VEINS (H): ICD-10-CM

## 2021-10-18 PROCEDURE — 87077 CULTURE AEROBIC IDENTIFY: CPT | Mod: 59 | Performed by: INTERNAL MEDICINE

## 2021-10-18 PROCEDURE — 97140 MANUAL THERAPY 1/> REGIONS: CPT | Mod: GP | Performed by: PHYSICAL THERAPY ASSISTANT

## 2021-10-18 PROCEDURE — 87205 SMEAR GRAM STAIN: CPT | Performed by: INTERNAL MEDICINE

## 2021-10-18 PROCEDURE — 87070 CULTURE OTHR SPECIMN AEROBIC: CPT | Performed by: INTERNAL MEDICINE

## 2021-10-18 PROCEDURE — 87186 SC STD MICRODIL/AGAR DIL: CPT | Mod: 59 | Performed by: INTERNAL MEDICINE

## 2021-10-18 PROCEDURE — 99215 OFFICE O/P EST HI 40 MIN: CPT | Performed by: INTERNAL MEDICINE

## 2021-10-18 RX ORDER — FLUCONAZOLE 200 MG/1
200 TABLET ORAL DAILY
Qty: 10 TABLET | Refills: 1 | Status: SHIPPED | OUTPATIENT
Start: 2021-10-18 | End: 2021-10-28

## 2021-10-18 NOTE — PROGRESS NOTES
INFECTIOUS DISEASE [place] [facility] FOLLOW UP NOTE      Date: 10/18/2021   Patient Name: Elizabeth Kelley   YOB: 1945  MRN: [id number]      ASSESSMENT:  RLE cellulitis with weeping ulcers, pseudomonas in wound.,  MRSA infection, on IV vancomycin since 9/30/2021, and on iv meropenem since ~8/6 with some improvement. Wbc and CRP stable.     Concern about noninfectious underlying etiology such as pyoderma.  Dermatology referral would be indicated    Redness at PICC site on R.  Switch to left side, with hypoallergenic dressing    Still has open wounds some granulation tissue, large areas with slough    Photos on 10/18/2021:            Inguinal candidiasis      PLAN:    We repeated wound cultures today and will determine in 5-7 days, if we can stop or need to change antibiotics.     For Candida/yeast groin infection, recommend Fluconazole x 10-14 days.     Intra-dry cloth to absorb the moisture in the groin region.    Recommend Dermatology referral to evaluate for non infectious underlying inflammatory conditions and topical treatment-- differential diagnosis: PG (pyoderma), other inflammatory lesions that may require antiinflammatory medications.    Continue PICC and IV antibiotics until cultures results. Will re-evaluate on 10/19, and sooner if culture results become available.     Continue to follow-up with wound care    Needs lymphedema treatment    Follow up with me or Dr. Rodriguez in 2-3 weeks, depending on availability    Kathe Vargas MD  [facility]  [phone]          ______________________________________________________________________    SUBJECTIVE / INTERVAL HISTORY: Elizabeth Kelley returns for follow up of RLE cellulitis.    Pain improved, wounds are less deep in certain areas.  Continues to have some slough  Wound care following  Tolerating vancomycin and meropenem    9/30:    Continues on meropenem. Tolerating this and infusions. No rash or diarrhea. Difficulty  "with tape, but stable. Notices more pain, especially in am. Pain awakens her. Takes tylenol 1gm in am and again right before bed. Not on narcotic pain meds. Rarely takes alleve. Pain at other times is tolerable.  Sees wound care every 2 weeks and dresses her own wounds bid with nonadherent dressing.    ROS: All other systems negative except as listed above.      Current Outpatient Medications:      fluconazole (DIFLUCAN) 200 MG tablet, Take 1 tablet (200 mg) by mouth daily for 10 days, Disp: 10 tablet, Rfl: 1     Skin Protectants, Misc. (INTERDRY 10\"X36\") SHEE, Externally apply 7 Units topically once as needed (change daily in groin rash/fold), Disp: 7 each, Rfl: 3     aspirin 81 mg chewable tablet, Take 81 mg by mouth every evening , Disp: , Rfl:      buPROPion (WELLBUTRIN SR) 150 MG 12 hr tablet, Take 150 mg by mouth every morning , Disp: , Rfl:      cholecalciferol, vitamin D3, 1,000 unit tablet, [CHOLECALCIFEROL, VITAMIN D3, 1,000 UNIT TABLET] Take 2,000 Units by mouth daily., Disp: , Rfl:      citalopram (CELEXA) 40 MG tablet, Take 40 mg by mouth every morning , Disp: , Rfl:      furosemide (LASIX) 20 MG tablet, Take 20 mg by mouth 2 times daily AM and afternoon (4PM), Disp: , Rfl:      HYDROcodone-acetaminophen (NORCO) 5-325 MG tablet, TAKE 1 TABLET BY MOUTH EVERY 8 HOURS AS NEEDED FOR 4 DAYS, Disp: , Rfl:      multivitamin therapeutic (THERAGRAN) tablet, [MULTIVITAMIN THERAPEUTIC (THERAGRAN) TABLET] Take 1 tablet by mouth daily., Disp: , Rfl:      multivitamin w/minerals (CENTRUM ADULTS) tablet, as directed, Disp: , Rfl:      naproxen sodium (ALEVE) 220 MG tablet, Take 440 mg by mouth daily as needed , Disp: , Rfl:      oxyCODONE (ROXICODONE) 5 MG tablet, Take 1 tablet (5 mg) by mouth every 8 hours as needed for breakthrough pain or severe pain, Disp: 9 tablet, Rfl: 0     pantoprazole (PROTONIX) 40 MG tablet, [PANTOPRAZOLE (PROTONIX) 40 MG TABLET] Take 40 mg by mouth daily., Disp: , Rfl:      potassium " chloride ER (KLOR-CON M) 20 MEQ CR tablet, Take 20 mEq by mouth every evening, Disp: , Rfl:      potassium chloride SA (K-DUR,KLOR-CON) 20 MEQ tablet, Take 40 mEq by mouth every morning , Disp: , Rfl:      rOPINIRole (REQUIP) 1 MG tablet, Take 1 mg by mouth At Bedtime, Disp: , Rfl:      simvastatin (ZOCOR) 40 MG tablet, [SIMVASTATIN (ZOCOR) 40 MG TABLET] Take 40 mg by mouth bedtime., Disp: , Rfl:      spironolactone (ALDACTONE) 25 MG tablet, [SPIRONOLACTONE (ALDACTONE) 25 MG TABLET] Take 25 mg by mouth daily., Disp: , Rfl:      VANCOMYCIN HCL PO, 4 times daily, Disp: , Rfl:       OBJECTIVE:  /66   Pulse 56   Temp 98.3  F (36.8  C)   Wt 115.2 kg (254 lb)   BMI 47.99 kg/m        GEN: No acute distress.    RESPIRATORY:  Normal breathing pattern.   EXTREMITIES: chronic lymphedema  SKIN/HAIR/NAILS: Inguinal candidiasis  Rash on right upper extremity, PICC line removed from right and now on left  Right lower extremity:  Open ulcerations with serpiginous borders, considerable slough. No undermining, no fluctuance or tunneling. Open ulcerations are tender    Picture from previous visit:       New Pictures from 9/30/21:  Anterior view, right leg      Medial view, right leg      Lateral view, right leg      Photos from 10/18/2021:                  Pertinent labs:    CRP   Date Value Ref Range Status   10/11/2021 2.8 (H) 0.0-<0.8 mg/dL Final     Last Comprehensive Metabolic Panel:  Sodium   Date Value Ref Range Status   10/13/2021 141 136 - 145 mmol/L Final     Potassium   Date Value Ref Range Status   10/13/2021 4.7 3.5 - 5.0 mmol/L Final     Chloride   Date Value Ref Range Status   10/13/2021 104 98 - 107 mmol/L Final     Carbon Dioxide (CO2)   Date Value Ref Range Status   10/13/2021 30 22 - 31 mmol/L Final     Anion Gap   Date Value Ref Range Status   10/13/2021 7 5 - 18 mmol/L Final     Glucose   Date Value Ref Range Status   10/13/2021 87 70 - 125 mg/dL Final     Urea Nitrogen   Date Value Ref Range Status    10/13/2021 19 8 - 28 mg/dL Final     Creatinine   Date Value Ref Range Status   10/13/2021 0.83 0.60 - 1.10 mg/dL Final     GFR Estimate   Date Value Ref Range Status   10/13/2021 70 >60 mL/min/1.73m2 Final     Comment:     As of July 11, 2021, eGFR is calculated by the CKD-EPI creatinine equation, without race adjustment. eGFR can be influenced by muscle mass, exercise, and diet. The reported eGFR is an estimation only and is only applicable if the renal function is stable.   09/26/2020 >60 >60 mL/min/1.73m2 Final     Calcium   Date Value Ref Range Status   10/13/2021 9.7 8.5 - 10.5 mg/dL Final     CBC RESULTS:   Recent Labs   Lab Test 09/07/21  1000   WBC 9.4   RBC 4.12   HGB 10.9*   HCT 35.0   MCV 85   MCH 26.5   MCHC 31.1*   RDW 14.6          MICROBIOLOGY DATA:    T Cell Subset:  WBC Count   Date Value Ref Range Status   10/13/2021 9.3 4.0 - 11.0 10e3/uL Final     % Lymphocytes   Date Value Ref Range Status   10/13/2021 16 % Final         RADIOLOGY:    Recent Results (from the past 744 hour(s))   Chest XR,  PA & LAT    Narrative    EXAM: XR CHEST 2 VW  LOCATION: [facility]  DATE/TIME: 10/13/2021 12:12 PM    INDICATION: Pelvic ultrasound. Reevaluate.  COMPARISON: 04/09/2015.      Impression    IMPRESSION:     Right PICC tip over the right axilla.    Lungs are clear. Mild right hemidiaphragm eventration. No pleural effusion or pneumothorax. Normal heart size. Mildly tortuous aorta.     Degenerative changes spine.          Collected Updated Procedure Result Status    10/18/2021 1511 10/18/2021 1607 Wound Aerobic Bacterial Culture Routine with Gram Stain [[id number]]   Wound from Leg, Below Knee, Right    In process Component Value   No component results              08/02/2021 1531 08/04/2021 1340 Wound Aerobic Bacterial Culture Routine [[id number]]    (Abnormal)   Wound from Leg, Below Knee, Right    Final result Component Value   Culture 4+ Pseudomonas aeruginosaAbnormal           Susceptibility     Pseudomonas aeruginosa     MARYBEL     Aztreonam >16 ug/mL Resistant     Cefepime 8 ug/mL Susceptible     Ceftazidime >16 ug/mL Resistant     Ciprofloxacin >2 ug/mL Resistant     Gentamicin 8 ug/mL Intermediate     Levofloxacin >4 ug/mL Resistant     Meropenem 4 ug/mL Intermediate     Piperacillin/Tazobactam >64/4 ug/mL Resistant     Tobramycin <=2 ug/mL Susceptible                  07/21/2021 2238 07/21/2021 2332 Asymptomatic COVID-19 Virus (Coronavirus) by PCR Nasopharyngeal [[id number]]    Swab from Nasopharyngeal    Final result Component Value   No component results           07/21/2021 2238 07/21/2021 2332 SARS-COV2 (COVID-19) Virus RT-PCR [[id number]]    Swab from Nasopharyngeal    Final result Component Value   SARS CoV2 PCR Negative   NEGATIVE: SARS-CoV-2 (COVID-19) RNA not detected, presumed negative.           07/21/2021 1855 07/26/2021 2231 Blood Culture Peripheral Blood [[id number]]   Peripheral Blood    Final result Component Value   Culture No Growth              07/21/2021 1855 07/26/2021 2231 Blood Culture Peripheral Blood [[id number]]   Peripheral Blood    Final result Component Value   Culture No Growth

## 2021-10-18 NOTE — ADDENDUM NOTE
Encounter addended by: Bee Olguin PT on: 10/18/2021 1:06 PM   Actions taken: Clinical Note Signed, Flowsheet accepted, Document created, Document edited Medication(s) Requested: Adderall  Last office visit: 9/5/18  Last refill: 11/19/18  Is the patient due for refill of this medication(s): Yes  PDMP review: Criteria met. Forwarded to Physician/CHRISTOPHER for signature.

## 2021-10-18 NOTE — PATIENT INSTRUCTIONS
Ramon Johnson,  Thank you for taking the time to see us today. We repeated wound cultures today and will determine in 5-7 days, if we can stop or need to change antibiotics.     For Candida/yeast groin infection, recommend Fluconazole x 10-14 days.     Intra-dry cloth to absorb the moisture in the groin region.    Recommend Dermatology referral to evaluate for non infectious underlying inflammatory conditions and topical treatment-- differential diagnosis: PG (pyoderma), other inflammatory lesions that may require antiinflammatory.     Continue PICC and IV antibiotics until cultures results. Will re-evaluate on 10/19, and sooner if culture results become available.       Follow up in 2-3 weeks    Kathe Vargas MD  [facility]  [phone]

## 2021-10-18 NOTE — PROGRESS NOTES
Rehabilitation Services      OUTPATIENT PHYSICAL THERAPY  PLAN OF TREATMENT FOR OUTPATIENT REHABILITATION    Patient's Last Name, First Name, M.I.                YOB: 1945  Elizabeth Kelley                        Provider's Name  Bee Clau, PT Medical Record No.  [id number]                               Onset Date: 10/20/21   Start of Care Date: 4/20/21   Type:     _X_PT   ___OT   ___SLP Medical Diagnosis: B LE swelling                       PT Diagnosis: LE swelling      _________________________________________________________________________________  Plan of Treatment:    Frequency/Duration: 1-2x/week x12 weeks     Goals:  See below    Certification date from 9/14/21 to 12/12/21.    Pooja Diallo PTA / Co-sign Bee Olguin PT         I CERTIFY THE NEED FOR THESE SERVICES FURNISHED UNDER        THIS PLAN OF TREATMENT AND WHILE UNDER MY CARE .             Physician Signature               Date    X_____________________________________________________                      Referring Provider: Dr. Demond Walters/ Dr. Francisco Ramirez        09/17/21 1100   Signing Clinician's Name / Credentials   Signing clinician's name / credentials Alexandra Diallo PTA,CLT   Session Number   Session Number 18   Additional Session Number of 20   Session Tracking, Supervision and Quick Adds   PT Assistant Visit Number 13   Progress Note/Recertification   Progress Note Due Date 08/19/21   Progress Note Completed Date 08/18/21   Recertification Due Date 09/14/21   Goal 1   Goal identifier Patient will have a decreased volume in : right;LE;by 5%;to decrease risk of infection;for better fit of clothing;for improved body image;for ease of movement;in 12 weeks   Goal 2   Goal identifier Patient will have a decreased volume in : right;LE;by 5%;to decrease risk of infection;for better fit of clothing;for improved body image;for ease of  "movement;in 12 weeks   Goal 3   Goal identifier Patient will perform, verbalize self-management of: skin care;self-monitoring;exercise;massage;self-compression;compression wear;compression care;infection prevention;in 12 weeks   Goal description IMPROVING - pt compliant with HEP and compression wrapping and wound dressing changes 2x/day   Goal 4   Goal identifier Pt will: demo decreased wound size by 50% for improvement of skin quality and decreased risk for infections in 12 weeks.   Goal description IMPROVING - had worsened with infection but now improving again   Subjective Report   Subjective Report Pt staets the wound is healing . \" About a 1/3 of the wound is healed. Pt still has the oic line. \" I do my own infusions.\" Pt's last visit was on 8/18 this was due to our schedule. Pt reports she is not having any paon. \" I'm doing fine.\"    Objective Measures   Objective Measures Objective Measure 1   Objective Measure 1   Objective Measure Volume measurements   Details R LE volumes today: 33150.16, was 34359.13 on 8/18. Volumes slightly up from last measurement a month ago.    Treatment Interventions   Interventions Manual Therapy;Self Care/Home Management   Manual Therapy   Manual Therapy: Mobilization, MFR, MLD, friction massage minutes (25590) 30   Skilled Intervention Reassessed R LE volumes and discussed results compared to 6/16/21   Treatment Detail Brief MLD to L LE   Assessments Completed   Assessments Completed Despite recent serious infection with hospitalization x6 days pt's R LE is only ~100 cm3 larger than 6 weeks ago and wound and skin quality are again improving.   Education   Learner Patient   Readiness Eager   Method Booklet/handout   Response Verbalizes Understanding   Plan   Home program Pt will continue wiht her LE lymph ex's    Plan for next session Pt still on self infusion for the anti biotics    Comments   Comments Pt will follow up with Staci Junior NP on 9/27.    Total Session Time   Timed " Code Treatment Minutes 30   Total Treatment Time (sum of timed and untimed services) 30

## 2021-10-19 ENCOUNTER — HOME INFUSION (PRE-WILLOW HOME INFUSION) (OUTPATIENT)
Dept: PHARMACY | Facility: CLINIC | Age: 76
End: 2021-10-19

## 2021-10-20 ENCOUNTER — HOME INFUSION (PRE-WILLOW HOME INFUSION) (OUTPATIENT)
Dept: PHARMACY | Facility: CLINIC | Age: 76
End: 2021-10-20

## 2021-10-20 ENCOUNTER — LAB REQUISITION (OUTPATIENT)
Dept: LAB | Facility: CLINIC | Age: 76
End: 2021-10-20
Payer: COMMERCIAL

## 2021-10-20 DIAGNOSIS — L03.115 CELLULITIS OF RIGHT LOWER LIMB: ICD-10-CM

## 2021-10-20 LAB
AST SERPL W P-5'-P-CCNC: 16 U/L (ref 0–40)
BASOPHILS # BLD AUTO: 0.1 10E3/UL (ref 0–0.2)
BASOPHILS NFR BLD AUTO: 1 %
BUN SERPL-MCNC: 21 MG/DL (ref 8–28)
CREAT SERPL-MCNC: 0.73 MG/DL (ref 0.6–1.1)
CRP SERPL HS-MCNC: 31.7 MG/L (ref 0–3)
EOSINOPHIL # BLD AUTO: 0.4 10E3/UL (ref 0–0.7)
EOSINOPHIL NFR BLD AUTO: 4 %
ERYTHROCYTE [DISTWIDTH] IN BLOOD BY AUTOMATED COUNT: 14.6 % (ref 10–15)
ERYTHROCYTE [SEDIMENTATION RATE] IN BLOOD BY WESTERGREN METHOD: 55 MM/HR (ref 0–20)
GFR SERPL CREATININE-BSD FRML MDRD: 81 ML/MIN/1.73M2
HCT VFR BLD AUTO: 33.2 % (ref 35–47)
HGB BLD-MCNC: 10.2 G/DL (ref 11.7–15.7)
IMM GRANULOCYTES # BLD: 0.1 10E3/UL
IMM GRANULOCYTES NFR BLD: 1 %
LYMPHOCYTES # BLD AUTO: 1.1 10E3/UL (ref 0.8–5.3)
LYMPHOCYTES NFR BLD AUTO: 11 %
MCH RBC QN AUTO: 25.9 PG (ref 26.5–33)
MCHC RBC AUTO-ENTMCNC: 30.7 G/DL (ref 31.5–36.5)
MCV RBC AUTO: 84 FL (ref 78–100)
MONOCYTES # BLD AUTO: 1.1 10E3/UL (ref 0–1.3)
MONOCYTES NFR BLD AUTO: 11 %
NEUTROPHILS # BLD AUTO: 7 10E3/UL (ref 1.6–8.3)
NEUTROPHILS NFR BLD AUTO: 72 %
NRBC # BLD AUTO: 0 10E3/UL
NRBC BLD AUTO-RTO: 0 /100
PLATELET # BLD AUTO: 332 10E3/UL (ref 150–450)
RBC # BLD AUTO: 3.94 10E6/UL (ref 3.8–5.2)
VANCOMYCIN SERPL-MCNC: 18.5 MG/L
WBC # BLD AUTO: 9.7 10E3/UL (ref 4–11)

## 2021-10-20 PROCEDURE — 84520 ASSAY OF UREA NITROGEN: CPT | Performed by: INTERNAL MEDICINE

## 2021-10-20 PROCEDURE — 85652 RBC SED RATE AUTOMATED: CPT | Performed by: INTERNAL MEDICINE

## 2021-10-20 PROCEDURE — 85025 COMPLETE CBC W/AUTO DIFF WBC: CPT | Performed by: INTERNAL MEDICINE

## 2021-10-20 PROCEDURE — 82565 ASSAY OF CREATININE: CPT | Performed by: INTERNAL MEDICINE

## 2021-10-20 PROCEDURE — 84450 TRANSFERASE (AST) (SGOT): CPT | Performed by: INTERNAL MEDICINE

## 2021-10-20 PROCEDURE — 80202 ASSAY OF VANCOMYCIN: CPT | Performed by: INTERNAL MEDICINE

## 2021-10-20 PROCEDURE — 86141 C-REACTIVE PROTEIN HS: CPT | Performed by: INTERNAL MEDICINE

## 2021-10-20 NOTE — PROGRESS NOTES
This is a recent snapshot of the patient's [facility] medical record.  For current drug dose and complete information and questions, call [phone] or In Basket pool, fv home infusion ([id number])  CSN Number:  [id number]

## 2021-10-20 NOTE — PROGRESS NOTES
This is a recent snapshot of the patient's [facility] medical record.  For current drug dose and complete information and questions, call [phone] or In Basket pool, fv home infusion ([id number])  CSN Number:  [id number]

## 2021-10-20 NOTE — PROGRESS NOTES
This is a recent snapshot of the patient's [facility] medical record.  For current drug dose and complete information and questions, call [phone] or In Basket pool, fv home infusion ([id number])  CSN Number:  [id number]

## 2021-10-21 NOTE — PROGRESS NOTES
This is a recent snapshot of the patient's [facility] medical record.  For current drug dose and complete information and questions, call [phone] or In Basket pool, fv home infusion ([id number])  CSN Number:  [id number]

## 2021-10-22 ENCOUNTER — HOME INFUSION (PRE-WILLOW HOME INFUSION) (OUTPATIENT)
Dept: PHARMACY | Facility: CLINIC | Age: 76
End: 2021-10-22
Payer: COMMERCIAL

## 2021-10-23 LAB
BACTERIA WND CULT: ABNORMAL
BACTERIA WND CULT: ABNORMAL
GRAM STAIN RESULT: ABNORMAL
GRAM STAIN RESULT: ABNORMAL

## 2021-10-25 ENCOUNTER — HOSPITAL ENCOUNTER (OUTPATIENT)
Dept: PHYSICAL THERAPY | Facility: REHABILITATION | Age: 76
End: 2021-10-25
Payer: COMMERCIAL

## 2021-10-25 ENCOUNTER — HOME INFUSION (PRE-WILLOW HOME INFUSION) (OUTPATIENT)
Dept: PHARMACY | Facility: CLINIC | Age: 76
End: 2021-10-25

## 2021-10-25 DIAGNOSIS — I89.0 LYMPHEDEMA: Primary | ICD-10-CM

## 2021-10-25 DIAGNOSIS — R22.43 LOCALIZED SWELLING OF BOTH LOWER LEGS: ICD-10-CM

## 2021-10-25 DIAGNOSIS — I83.018 VENOUS STASIS ULCER OF OTHER PART OF RIGHT LOWER LEG LIMITED TO BREAKDOWN OF SKIN WITH VARICOSE VEINS (H): ICD-10-CM

## 2021-10-25 DIAGNOSIS — L97.811 VENOUS STASIS ULCER OF OTHER PART OF RIGHT LOWER LEG LIMITED TO BREAKDOWN OF SKIN WITH VARICOSE VEINS (H): ICD-10-CM

## 2021-10-25 PROCEDURE — 97140 MANUAL THERAPY 1/> REGIONS: CPT | Mod: GP | Performed by: PHYSICAL THERAPY ASSISTANT

## 2021-10-25 NOTE — PROGRESS NOTES
This is a recent snapshot of the patient's [facility] medical record.  For current drug dose and complete information and questions, call [phone] or In Basket pool, fv home infusion ([id number])  CSN Number:  [id number]

## 2021-10-26 ENCOUNTER — HOME INFUSION (PRE-WILLOW HOME INFUSION) (OUTPATIENT)
Dept: PHARMACY | Facility: CLINIC | Age: 76
End: 2021-10-26

## 2021-10-26 NOTE — PROGRESS NOTES
This is a recent snapshot of the patient's [facility] medical record.  For current drug dose and complete information and questions, call [phone] or In Basket pool, fv home infusion ([id number])  CSN Number:  [id number]

## 2021-10-27 ENCOUNTER — HOSPITAL ENCOUNTER (OUTPATIENT)
Dept: PHYSICAL THERAPY | Facility: REHABILITATION | Age: 76
End: 2021-10-27
Payer: COMMERCIAL

## 2021-10-27 ENCOUNTER — ANCILLARY PROCEDURE (OUTPATIENT)
Dept: VASCULAR ULTRASOUND | Facility: CLINIC | Age: 76
End: 2021-10-27
Attending: NURSE PRACTITIONER
Payer: COMMERCIAL

## 2021-10-27 DIAGNOSIS — E66.01 MORBID OBESITY (H): ICD-10-CM

## 2021-10-27 DIAGNOSIS — L90.5 SCAR CONDITION AND FIBROSIS OF SKIN: ICD-10-CM

## 2021-10-27 DIAGNOSIS — R09.89 OTHER SPECIFIED SYMPTOMS AND SIGNS INVOLVING THE CIRCULATORY AND RESPIRATORY SYSTEMS: ICD-10-CM

## 2021-10-27 DIAGNOSIS — I87.303 VENOUS HYPERTENSION OF LOWER EXTREMITY, BILATERAL: ICD-10-CM

## 2021-10-27 DIAGNOSIS — L08.9 RECURRENT INFECTION OF SKIN: ICD-10-CM

## 2021-10-27 DIAGNOSIS — I89.0 LYMPHEDEMA: Primary | ICD-10-CM

## 2021-10-27 DIAGNOSIS — I87.2 VENOUS INSUFFICIENCY OF BOTH LOWER EXTREMITIES: ICD-10-CM

## 2021-10-27 DIAGNOSIS — E66.01 MORBID OBESITY WITH BMI OF 50.0-59.9, ADULT (H): ICD-10-CM

## 2021-10-27 DIAGNOSIS — R22.43 LOCALIZED SWELLING OF BOTH LOWER LEGS: ICD-10-CM

## 2021-10-27 DIAGNOSIS — I89.0 ACQUIRED LYMPHEDEMA OF LEG: ICD-10-CM

## 2021-10-27 DIAGNOSIS — L97.912 CHRONIC ULCER OF RIGHT LEG, WITH FAT LAYER EXPOSED (H): ICD-10-CM

## 2021-10-27 DIAGNOSIS — L97.811 VENOUS STASIS ULCER OF OTHER PART OF RIGHT LOWER LEG LIMITED TO BREAKDOWN OF SKIN WITH VARICOSE VEINS (H): ICD-10-CM

## 2021-10-27 DIAGNOSIS — I83.018 VENOUS STASIS ULCER OF OTHER PART OF RIGHT LOWER LEG LIMITED TO BREAKDOWN OF SKIN WITH VARICOSE VEINS (H): ICD-10-CM

## 2021-10-27 PROCEDURE — 93922 UPR/L XTREMITY ART 2 LEVELS: CPT | Mod: 26 | Performed by: SURGERY

## 2021-10-27 PROCEDURE — 97535 SELF CARE MNGMENT TRAINING: CPT | Mod: GP | Performed by: PHYSICAL THERAPIST

## 2021-10-27 PROCEDURE — 93971 EXTREMITY STUDY: CPT | Mod: 26 | Performed by: SURGERY

## 2021-10-27 PROCEDURE — 97140 MANUAL THERAPY 1/> REGIONS: CPT | Mod: GP | Performed by: PHYSICAL THERAPIST

## 2021-10-27 PROCEDURE — 93971 EXTREMITY STUDY: CPT | Mod: RT

## 2021-10-27 PROCEDURE — 93922 UPR/L XTREMITY ART 2 LEVELS: CPT

## 2021-10-28 NOTE — PROGRESS NOTES
This is a recent snapshot of the patient's [facility] medical record.  For current drug dose and complete information and questions, call [phone] or In Basket pool, fv home infusion ([id number])  CSN Number:  [id number]

## 2021-10-29 ENCOUNTER — TELEPHONE (OUTPATIENT)
Dept: VASCULAR SURGERY | Facility: CLINIC | Age: 76
End: 2021-10-29

## 2021-10-29 NOTE — TELEPHONE ENCOUNTER
----- Message from Staci Junior NP sent at 10/29/2021  1:41 PM CDT -----  Please let the patient know that her veins and artery studies were normal

## 2021-11-01 NOTE — PROGRESS NOTES
This is a recent snapshot of the patient's [facility] medical record.  For current drug dose and complete information and questions, call [phone] or In Basket pool, fv home infusion ([id number])  CSN Number:  [id number]

## 2021-11-01 NOTE — PROGRESS NOTES
This is a recent snapshot of the patient's [facility] medical record.  For current drug dose and complete information and questions, call [phone] or In Basket pool, fv home infusion ([id number])  CSN Number:  [id number]

## 2021-11-01 NOTE — PROGRESS NOTES
This is a recent snapshot of the patient's [facility] medical record.  For current drug dose and complete information and questions, call [phone] or In Basket pool, fv home infusion ([id number])  CSN Number:  [id number]

## 2021-11-02 ENCOUNTER — TELEPHONE (OUTPATIENT)
Dept: INFECTIOUS DISEASES | Facility: CLINIC | Age: 76
End: 2021-11-02

## 2021-11-02 NOTE — TELEPHONE ENCOUNTER
Per Dr Vargas, called pt to inquire how yeast/rahs on fluconazole is going. Pt states finished medication yesterday. Did not  refill, has been clearing up but not complete. Will wait to see if it worsens, if so will  refill. Informed of below if restarted. State had had muscle weakness, understands directions below. No questions    ----- Message from Kathe Vargas MD sent at 11/1/2021  5:12 PM CDT -----  Please call the patient and ask to hold simvastatin while the patient is on Fluconazole due to risk of affecting the muscle.  Thank you    AR

## 2021-11-03 NOTE — PROGRESS NOTES
This is a recent snapshot of the patient's [facility] medical record.  For current drug dose and complete information and questions, call [phone] or In [facility] pool, fv home infusion ([id number])  CSN Number:  [id number]

## 2021-11-03 NOTE — PROGRESS NOTES
This is a recent snapshot of the patient's [facility] medical record.  For current drug dose and complete information and questions, call [phone] or In Basket pool, fv home infusion ([id number])  CSN Number:  [id number]

## 2021-11-04 NOTE — PROGRESS NOTES
This is a recent snapshot of the patient's [facility] medical record.  For current drug dose and complete information and questions, call [phone] or In Basket pool, fv home infusion ([id number])  CSN Number:  [id number]

## 2021-11-08 ENCOUNTER — OFFICE VISIT (OUTPATIENT)
Dept: INFECTIOUS DISEASES | Facility: CLINIC | Age: 76
End: 2021-11-08
Payer: COMMERCIAL

## 2021-11-08 VITALS
DIASTOLIC BLOOD PRESSURE: 70 MMHG | WEIGHT: 249.8 LBS | HEART RATE: 76 BPM | TEMPERATURE: 98 F | BODY MASS INDEX: 47.2 KG/M2 | OXYGEN SATURATION: 99 % | SYSTOLIC BLOOD PRESSURE: 126 MMHG

## 2021-11-08 DIAGNOSIS — L03.115 CELLULITIS OF RIGHT LOWER EXTREMITY: ICD-10-CM

## 2021-11-08 DIAGNOSIS — T14.8XXA NON-HEALING NON-SURGICAL WOUND: Primary | ICD-10-CM

## 2021-11-08 PROCEDURE — 99213 OFFICE O/P EST LOW 20 MIN: CPT | Performed by: INTERNAL MEDICINE

## 2021-11-08 NOTE — PATIENT INSTRUCTIONS
Ramon Johnson,  Thank you for taking the time to see us today. We will continue to monitor off the antibiotics at this time with close attention to wound care and lymphedema management.   Please see if Primary can help with the pain (appears to be related to nerve/wound healing). Dermatology appointment would be very helpful for additional treatment options.     Follow up as needed    Kathe Vargas MD  [facility]  [phone]

## 2021-11-08 NOTE — PROGRESS NOTES
INFECTIOUS DISEASE [facility] FOLLOW UP NOTE      Date: 11/08/2021   Patient Name: Elizabeth Kelley   YOB: 1945  MRN: [id number]      ASSESSMENT:  RLE cellulitis with weeping ulcers, pseudomonas in wound.,  MRSA infection, on IV vancomycin since 9/30/2021, and on iv meropenem since ~8/6 with some improvement.  Has completed IV antibiotic therapy  Pseudomonas is multidrug resistant     Wbc and CRP stable.     Concern about noninfectious underlying etiology such as pyoderma.  Dermatology referral requested and pending.    Redness at PICC site on R.  Switch to left side, with hypoallergenic dressing    Open wounds have significantly improved and slough has decreased  Patient has completed antibiotic therapy  Photo today 11/8/2021                  Photos on 10/18/2021:            Inguinal candidiasis      PLAN:     We will continue to monitor off the antibiotics at this time with close attention to wound care and lymphedema management.   Please see if Primary can help with the pain (appears to be related to nerve/wound healing). Dermatology appointment would be very helpful for additional treatment options.     Follow up as needed    Kathe Vargas MD  [facility]  [phone]              ______________________________________________________________________    SUBJECTIVE / INTERVAL HISTORY: Elizabeth Kelley returns for follow up of RLE cellulitis.  Slough improved, painful.  Wounds improving  Awaiting dermatology consultation  Previous note:    Pain improved, wounds are less deep in certain areas.  Continues to have some slough  Wound care following  Tolerating vancomycin and meropenem    9/30:    Continues on meropenem. Tolerating this and infusions. No rash or diarrhea. Difficulty with tape, but stable. Notices more pain, especially in am. Pain awakens her. Takes tylenol 1gm in am and again right before bed. Not on narcotic pain meds. Rarely takes alleve. Pain at other  "times is tolerable.  Sees wound care every 2 weeks and dresses her own wounds bid with nonadherent dressing.    ROS: All other systems negative except as listed above.      Current Outpatient Medications:      aspirin 81 mg chewable tablet, Take 81 mg by mouth every evening , Disp: , Rfl:      buPROPion (WELLBUTRIN SR) 150 MG 12 hr tablet, Take 150 mg by mouth every morning , Disp: , Rfl:      cholecalciferol, vitamin D3, 1,000 unit tablet, [CHOLECALCIFEROL, VITAMIN D3, 1,000 UNIT TABLET] Take 2,000 Units by mouth daily., Disp: , Rfl:      citalopram (CELEXA) 40 MG tablet, Take 40 mg by mouth every morning , Disp: , Rfl:      furosemide (LASIX) 20 MG tablet, Take 20 mg by mouth 2 times daily AM and afternoon (4PM), Disp: , Rfl:      HYDROcodone-acetaminophen (NORCO) 5-325 MG tablet, TAKE 1 TABLET BY MOUTH EVERY 8 HOURS AS NEEDED FOR 4 DAYS, Disp: , Rfl:      multivitamin therapeutic (THERAGRAN) tablet, [MULTIVITAMIN THERAPEUTIC (THERAGRAN) TABLET] Take 1 tablet by mouth daily., Disp: , Rfl:      multivitamin w/minerals (CENTRUM ADULTS) tablet, as directed, Disp: , Rfl:      naproxen sodium (ALEVE) 220 MG tablet, Take 440 mg by mouth daily as needed , Disp: , Rfl:      oxyCODONE (ROXICODONE) 5 MG tablet, Take 1 tablet (5 mg) by mouth every 8 hours as needed for breakthrough pain or severe pain, Disp: 9 tablet, Rfl: 0     pantoprazole (PROTONIX) 40 MG tablet, [PANTOPRAZOLE (PROTONIX) 40 MG TABLET] Take 40 mg by mouth daily., Disp: , Rfl:      potassium chloride ER (KLOR-CON M) 20 MEQ CR tablet, Take 20 mEq by mouth every evening, Disp: , Rfl:      potassium chloride SA (K-DUR,KLOR-CON) 20 MEQ tablet, Take 40 mEq by mouth every morning , Disp: , Rfl:      rOPINIRole (REQUIP) 1 MG tablet, Take 1 mg by mouth At Bedtime, Disp: , Rfl:      simvastatin (ZOCOR) 40 MG tablet, [SIMVASTATIN (ZOCOR) 40 MG TABLET] Take 40 mg by mouth bedtime., Disp: , Rfl:      Skin Protectants, Misc. (INTERDRY 10\"X36\") RODRIGO, Externally apply 7 " Units topically once as needed (change daily in groin rash/fold), Disp: 7 each, Rfl: 3     spironolactone (ALDACTONE) 25 MG tablet, [SPIRONOLACTONE (ALDACTONE) 25 MG TABLET] Take 25 mg by mouth daily., Disp: , Rfl:       OBJECTIVE:  /70   Pulse 76   Temp 98  F (36.7  C) (Oral)   Wt 113.3 kg (249 lb 12.8 oz)   SpO2 99%   BMI 47.20 kg/m        GEN: No acute distress.    RESPIRATORY:  Normal breathing pattern.   EXTREMITIES: chronic lymphedema  SKIN/HAIR/NAILS: Inguinal candidiasis  Rash on right upper extremity, PICC line removed from right and now on left  Right lower extremity:  Slough has significantly reduced. No undermining, no fluctuance or tunneling. Open ulcerations are tender  11/8/2021                  Picture from previous visit:       New Pictures from 9/30/21:  Anterior view, right leg      Medial view, right leg      Lateral view, right leg      Photos from 10/18/2021:                  Pertinent labs:    CRP   Date Value Ref Range Status   10/11/2021 2.8 (H) 0.0-<0.8 mg/dL Final     C-Reactive Protein High Sensitivity   Date Value Ref Range Status   10/20/2021 31.7 (H) 0.0 - 3.0 mg/L Final     Comment:     Low risk < 1.0 mg/L  Average risk 1.0 to 3.0 mg/L  High risk > 3.0 mg/L  Acute inflamation > 10.0 mg/L     Last Comprehensive Metabolic Panel:  Sodium   Date Value Ref Range Status   10/13/2021 141 136 - 145 mmol/L Final     Potassium   Date Value Ref Range Status   10/13/2021 4.7 3.5 - 5.0 mmol/L Final     Chloride   Date Value Ref Range Status   10/13/2021 104 98 - 107 mmol/L Final     Carbon Dioxide (CO2)   Date Value Ref Range Status   10/13/2021 30 22 - 31 mmol/L Final     Anion Gap   Date Value Ref Range Status   10/13/2021 7 5 - 18 mmol/L Final     Glucose   Date Value Ref Range Status   10/13/2021 87 70 - 125 mg/dL Final     Urea Nitrogen   Date Value Ref Range Status   10/20/2021 21 8 - 28 mg/dL Final     Creatinine   Date Value Ref Range Status   10/20/2021 0.73 0.60 - 1.10 mg/dL  Final     GFR Estimate   Date Value Ref Range Status   10/20/2021 81 >60 mL/min/1.73m2 Final     Comment:     As of July 11, 2021, eGFR is calculated by the CKD-EPI creatinine equation, without race adjustment. eGFR can be influenced by muscle mass, exercise, and diet. The reported eGFR is an estimation only and is only applicable if the renal function is stable.   09/26/2020 >60 >60 mL/min/1.73m2 Final     Calcium   Date Value Ref Range Status   10/13/2021 9.7 8.5 - 10.5 mg/dL Final     CBC RESULTS:   Recent Labs   Lab Test 09/07/21  1000   WBC 9.4   RBC 4.12   HGB 10.9*   HCT 35.0   MCV 85   MCH 26.5   MCHC 31.1*   RDW 14.6          MICROBIOLOGY DATA:    T Cell Subset:  WBC Count   Date Value Ref Range Status   10/20/2021 9.7 4.0 - 11.0 10e3/uL Final     % Lymphocytes   Date Value Ref Range Status   10/20/2021 11 % Final         RADIOLOGY:    Recent Results (from the past 744 hour(s))   Chest XR,  PA & LAT    Narrative    EXAM: XR CHEST 2 VW  LOCATION: [facility]  DATE/TIME: 10/13/2021 12:12 PM    INDICATION: Pelvic ultrasound. Reevaluate.  COMPARISON: 04/09/2015.      Impression    IMPRESSION:     Right PICC tip over the right axilla.    Lungs are clear. Mild right hemidiaphragm eventration. No pleural effusion or pneumothorax. Normal heart size. Mildly tortuous aorta.     Degenerative changes spine.       US ANSHUL with PPG wo Exercise Bilateral    Narrative    BILATERAL RESTING ANKLE-BRACHIAL INDICES (ANSHUL'S) (Date: 10/27/21)      Indication: SURVEILLANCE ANSHUL'S: RIGHT LEG CALF CHRONIC WEEPING WOUND NON   HEALING.     Previous: NONE    History: Previous Smoker, Skin Color Change and Vascular Ulcers     Resting ANSHUL's             Right: mmHg Index       Brachial: 132    Ankle-(PT): 161 1.21   Ankle-(DP): 167 1.26             Digit: 105 0.79                   Left: mmHg Index       Brachial: 133    Ankle-(PT): 164 1.23   Ankle-(DP): 154 1.16             Digit: 126 0.95     Resting  ankle-brachial index of 1.26 on the right. Toe Pressures of 105   mmHg and TBI of 0.79     Resting ankle-brachial index of 1.23 on the left. Toe Pressures of 126   mmHg and TBI of 0.95     WAVEFORMS: The right dorsalis pedis and posterior tibial arteries show   triphasic waveforms.   The left dorsalis pedis and posterior tibial arteries show triphasic   waveforms.    Comments:       Impression:      1. RIGHT LOWER EXTREMITY: ANSHUL is Normal with an ANSHUL of 1.26. and Normal   toe pressures of 105 mmHg.    2. LEFT LOWER EXTREMITY: ANSHUL is Normal with an ANSHUL of 1.23. and Normal toe   pressures of 126 mmHg.    Reference:     Wound classification  Grade ANSHUL Ankle Systolic Pressure Toe Pressures   0 > 0.80 > 100 mmHg > 60 mmHg   1 0.6 - 0.79 70 - 100 mmHg 40 - 59 mmHg   2 0.4 - 0.59 50-70 mmHg 30 - 39 mmHg   3 < 0.39 < 50 mmHg < 30 mmHg     Digit Pressures  DBI Disease Category   > 0.70 Normal   < 0.70 Abnormal   > 30 mmHg Potential wound healing   < 30 mmHg Impaired wound healing     Ankle Brachial Pressures  ANSHUL Disease Category   > 1.3  Likely vessel calcification with monophasic waveforms,   non-diagnostic   0.95-1.30 Normal with multiphasic waveforms   0.50-0.95 Single level disease   0.30-0.50 Multilevel disease   < 0.30 Critical limb ischema     Volume Plethysmography Recording (VPR) at all levels  Normal Sharp systolic peak, fast upstroke, prominent dicrotic notch in   wave   Mild Sharp systolic peak, fast upstroke, absent dicrotic notch in wave   Moderate Flattened systolic peak, slowed upstroke, absent dicrotic notch   in wave   Severe Low-amplitude wave with = upslope and down slope   Occluded Flat Line     Multiple Level Segmental Pressures   Normal Abnormal   Upper Thigh > 1.2 pressure indices, <30 mmHg between lateral and   horizontal cuffs < 0.8 pressure indices suggest proximal occlusion,   0.8-1.2 pressure indices suggest inflow disease, > 30 mmHg between lateral   and horizontal cuffs    Lower Thigh < 30  mmHg between lateral and horizontal cuffs > 30 mmHg   between lateral and horizontal cuffs   Upper Calf < 30 mmHg between lateral and horizontal cuffs > 30 mmHg   between lateral and horizontal cuffs   Note: As limb diameter increases, pressure measurements also increase.   US Venous Competency Right    Narrative    RIGHT Venous Insufficiency Ultrasound (Date: 10/27/21)  RIGHT Lower Extremity      Indication:  SURVEILLANCE RIGHT LEG VARICOSE VEINS, PAIN , SWELLING,    CHRONIC CALF  WEEPING WOUND NON HEALING.       Previous: NONE    Patient History: Swelling, Stasis and Morbid Obesity    Presenting Symptoms:  Swelling, Varicose Veins, Ulcers, Pain and Stasis    Technique:   Supine and Upright Ultrasound of the Deep and Superficial Veins with   Valsalva and Compression Augmentation Maneuvers. Duplex Imaging is   performed utilizing gray-scale, Two-dimensional images, color-flow   imaging, Doppler waveform analysis, and Spectral doppler imaging done with   provacative maneuvers.     Incompetency Criteria:  Deep vein reflux reported when greater than 1000 msec flow reversal.   Superficial vein reflux reported when equal to or greater than 600 msec   flow reversal.  vein reflux reported as greater than 350 msec   flow reversal.    Right Leg  Deep System     Location CFV SFJ PFV PROX SFV PROX SFV MID SFV DIST POP V. PERON. V. PTV'S     Compressibility  (FC,PC,NC) FC FC FC FC FC FC FC FC FC   Reflux -  - - - - -  -     Right Leg  Superficial System      Location SFJ PROX THIGH KNEE MID CALF SPJ PROX CALF MID CALF   GSV (mm) 3.4 3.4 2.5 2.1      Reflux - - - -      SSV (mm)     4.2 4.3 1.0   Reflux     - - -       Comments:      Impression:     Right Deep Vein Findings: No reflux noted    Right Superficial Vein Findings:     Right Greater saphenous vein:: Patent Greater Saphenous Vein without   evidence of reflux.    Right Small Saphenous Vein:: Patent Small Saphenous Vein without evidence   of  reflux.    Perforating and Accessory Veins: No reflux noted    Reference:    Compressibility: FC= Fully compressible, PC= Partially compressible, NC=   Non-compressible, NV= Not Visualized    Reflux: (+) Incompetent  (-) Competent, (NV) = Not Visualized    Interpretation criteria:  Duration of Retrograde flow (milliseconds)  Category Deep Veins Superficial Veins  veins   Competent < 1000ms < 600ms < 350ms   Incompetent > 1000ms > 600ms > 350ms         Collected Updated Procedure Result Status    10/18/2021 1511 10/18/2021 1607 Wound Aerobic Bacterial Culture Routine with Gram Stain [[id number]]   Wound from Leg, Below Knee, Right    In process Component Value   No component results              08/02/2021 1531 08/04/2021 1340 Wound Aerobic Bacterial Culture Routine [[id number]]    (Abnormal)   Wound from Leg, Below Knee, Right    Final result Component Value   Culture 4+ Pseudomonas aeruginosaAbnormal          Susceptibility     Pseudomonas aeruginosa     MARYBEL     Aztreonam >16 ug/mL Resistant     Cefepime 8 ug/mL Susceptible     Ceftazidime >16 ug/mL Resistant     Ciprofloxacin >2 ug/mL Resistant     Gentamicin 8 ug/mL Intermediate     Levofloxacin >4 ug/mL Resistant     Meropenem 4 ug/mL Intermediate     Piperacillin/Tazobactam >64/4 ug/mL Resistant     Tobramycin <=2 ug/mL Susceptible                  07/21/2021 2238 07/21/2021 2332 Asymptomatic COVID-19 Virus (Coronavirus) by PCR Nasopharyngeal [[id number]]    Swab from Nasopharyngeal    Final result Component Value   No component results           07/21/2021 2238 07/21/2021 2332 SARS-COV2 (COVID-19) Virus RT-PCR [[id number]]    Swab from Nasopharyngeal    Final result Component Value   SARS CoV2 PCR Negative   NEGATIVE: SARS-CoV-2 (COVID-19) RNA not detected, presumed negative.           07/21/2021 1855 07/26/2021 2231 Blood Culture Peripheral Blood [[id number]]   Peripheral Blood    Final result Component Value   Culture No Growth               07/21/2021 1855 07/26/2021 2231 Blood Culture Peripheral Blood [[id number]]   Peripheral Blood    Final result Component Value   Culture No Growth

## 2021-11-10 ENCOUNTER — LAB REQUISITION (OUTPATIENT)
Dept: LAB | Facility: CLINIC | Age: 76
End: 2021-11-10
Payer: COMMERCIAL

## 2021-11-10 DIAGNOSIS — I10 ESSENTIAL (PRIMARY) HYPERTENSION: ICD-10-CM

## 2021-11-10 DIAGNOSIS — E78.2 MIXED HYPERLIPIDEMIA: ICD-10-CM

## 2021-11-10 LAB
ANION GAP SERPL CALCULATED.3IONS-SCNC: 14 MMOL/L (ref 5–18)
BUN SERPL-MCNC: 20 MG/DL (ref 8–28)
CALCIUM SERPL-MCNC: 9.5 MG/DL (ref 8.5–10.5)
CHLORIDE BLD-SCNC: 104 MMOL/L (ref 98–107)
CHOLEST SERPL-MCNC: 161 MG/DL
CO2 SERPL-SCNC: 24 MMOL/L (ref 22–31)
CREAT SERPL-MCNC: 0.86 MG/DL (ref 0.6–1.1)
FASTING STATUS PATIENT QL REPORTED: ABNORMAL
GFR SERPL CREATININE-BSD FRML MDRD: 67 ML/MIN/1.73M2
GLUCOSE BLD-MCNC: 107 MG/DL (ref 70–125)
HDLC SERPL-MCNC: 61 MG/DL
LDLC SERPL CALC-MCNC: 68 MG/DL
POTASSIUM BLD-SCNC: 4.4 MMOL/L (ref 3.5–5)
SODIUM SERPL-SCNC: 142 MMOL/L (ref 136–145)
TRIGL SERPL-MCNC: 162 MG/DL

## 2021-11-10 PROCEDURE — 80061 LIPID PANEL: CPT | Mod: ORL | Performed by: PHYSICIAN ASSISTANT

## 2021-11-10 PROCEDURE — 80048 BASIC METABOLIC PNL TOTAL CA: CPT | Mod: ORL | Performed by: PHYSICIAN ASSISTANT

## 2021-11-17 ENCOUNTER — HOSPITAL ENCOUNTER (OUTPATIENT)
Dept: PHYSICAL THERAPY | Facility: REHABILITATION | Age: 76
End: 2021-11-17
Payer: COMMERCIAL

## 2021-11-17 DIAGNOSIS — I89.0 LYMPHEDEMA: Primary | ICD-10-CM

## 2021-11-17 DIAGNOSIS — I83.018 VENOUS STASIS ULCER OF OTHER PART OF RIGHT LOWER LEG LIMITED TO BREAKDOWN OF SKIN WITH VARICOSE VEINS (H): ICD-10-CM

## 2021-11-17 DIAGNOSIS — R22.43 LOCALIZED SWELLING OF BOTH LOWER LEGS: ICD-10-CM

## 2021-11-17 DIAGNOSIS — L97.811 VENOUS STASIS ULCER OF OTHER PART OF RIGHT LOWER LEG LIMITED TO BREAKDOWN OF SKIN WITH VARICOSE VEINS (H): ICD-10-CM

## 2021-11-17 PROCEDURE — 97140 MANUAL THERAPY 1/> REGIONS: CPT | Mod: GP | Performed by: PHYSICAL THERAPY ASSISTANT

## 2021-11-19 NOTE — PROGRESS NOTES
This is a recent snapshot of the patient's [facility] medical record.  For current drug dose and complete information and questions, call [phone] or In Basket pool, [facility] home infusion ([id number])  CSN Number:  [id number]

## 2021-11-23 ENCOUNTER — HOSPITAL ENCOUNTER (OUTPATIENT)
Dept: PHYSICAL THERAPY | Facility: REHABILITATION | Age: 76
End: 2021-11-23
Payer: COMMERCIAL

## 2021-11-23 DIAGNOSIS — I89.0 LYMPHEDEMA: Primary | ICD-10-CM

## 2021-11-23 DIAGNOSIS — L97.811 VENOUS STASIS ULCER OF OTHER PART OF RIGHT LOWER LEG LIMITED TO BREAKDOWN OF SKIN WITH VARICOSE VEINS (H): ICD-10-CM

## 2021-11-23 DIAGNOSIS — R22.43 LOCALIZED SWELLING OF BOTH LOWER LEGS: ICD-10-CM

## 2021-11-23 DIAGNOSIS — I83.018 VENOUS STASIS ULCER OF OTHER PART OF RIGHT LOWER LEG LIMITED TO BREAKDOWN OF SKIN WITH VARICOSE VEINS (H): ICD-10-CM

## 2021-11-23 PROCEDURE — 97140 MANUAL THERAPY 1/> REGIONS: CPT | Mod: GP | Performed by: PHYSICAL THERAPY ASSISTANT

## 2021-12-01 ENCOUNTER — OFFICE VISIT (OUTPATIENT)
Dept: VASCULAR SURGERY | Facility: CLINIC | Age: 76
End: 2021-12-01
Attending: NURSE PRACTITIONER
Payer: COMMERCIAL

## 2021-12-01 VITALS
HEART RATE: 75 BPM | BODY MASS INDEX: 47.01 KG/M2 | HEIGHT: 61 IN | WEIGHT: 249 LBS | SYSTOLIC BLOOD PRESSURE: 122 MMHG | DIASTOLIC BLOOD PRESSURE: 60 MMHG | OXYGEN SATURATION: 95 %

## 2021-12-01 DIAGNOSIS — L90.5 SCAR CONDITION AND FIBROSIS OF SKIN: ICD-10-CM

## 2021-12-01 DIAGNOSIS — L97.912 CHRONIC ULCER OF RIGHT LEG, WITH FAT LAYER EXPOSED (H): Primary | ICD-10-CM

## 2021-12-01 DIAGNOSIS — E66.01 MORBID OBESITY (H): ICD-10-CM

## 2021-12-01 DIAGNOSIS — I89.0 ACQUIRED LYMPHEDEMA OF LEG: ICD-10-CM

## 2021-12-01 DIAGNOSIS — I87.2 VENOUS INSUFFICIENCY OF BOTH LOWER EXTREMITIES: ICD-10-CM

## 2021-12-01 DIAGNOSIS — I87.303 VENOUS HYPERTENSION OF LOWER EXTREMITY, BILATERAL: ICD-10-CM

## 2021-12-01 DIAGNOSIS — L08.9 RECURRENT INFECTION OF SKIN: ICD-10-CM

## 2021-12-01 PROCEDURE — 11045 DBRDMT SUBQ TISS EACH ADDL: CPT | Performed by: NURSE PRACTITIONER

## 2021-12-01 PROCEDURE — 11044 DBRDMT BONE 1ST 20 SQ CM/<: CPT | Performed by: NURSE PRACTITIONER

## 2021-12-01 PROCEDURE — 11042 DBRDMT SUBQ TIS 1ST 20SQCM/<: CPT | Performed by: NURSE PRACTITIONER

## 2021-12-01 RX ORDER — PREGABALIN 100 MG/1
100 CAPSULE ORAL 2 TIMES DAILY
COMMUNITY
Start: 2021-11-10 | End: 2022-04-22

## 2021-12-01 ASSESSMENT — MIFFLIN-ST. JEOR: SCORE: 1566.84

## 2021-12-01 ASSESSMENT — PAIN SCALES - GENERAL: PAINLEVEL: MODERATE PAIN (5)

## 2021-12-01 NOTE — LETTER
2021    [facility]  Fax: [phone] Wound Dressing Rx and Order Form  Customer Service: [phone] Order Status: New   Verbal: Staci ZARCO  Patient Info:  Name: Elizabeth Kelley  : 1945  Address: [address] [place] [address] [address]  [facility] [address]        Insurance Info:  INSURER: Payor: [employer] / Plan: [employer] / Product Type: HMO /   Policy ID#:  [id number]  : 1945    Physician Info:   Name: Staci Novoa CNP   Dept Address/Phones:   [facility] [facility], [address]  [facility] [address] [phone]  Fax: [phone]        Impression:   Encounter Diagnoses   Name Primary?     Chronic ulcer of right leg, with fat layer exposed (H) Yes     Acquired lymphedema of leg      Recurrent infection of skin      Scar condition and fibrosis of skin      Morbid obesity (H)      Venous insufficiency of both lower extremities      Venous hypertension of lower extremity, bilateral        Lymphedema circumferential measurements (in cm):              Wound info:    VASC Wound right lower leg (Active)   Pre Size Length 15 21 1400   Pre Size Width 45 21 1400   Pre Size Depth 0.1 21 1400   Pre Total Sq cm 675 21 1400         Drainage: Mod  Thickness:  Thick  Duration of Need: 30  Days Supply: 30  Start Date: 2021  Starter Kit:NA  Qualifying wound/Debridement: yes     Dressing Type Brand Size Number of pieces Frequency of change   Primary Cutimed Sorbact WCL  8x10 60 Twice per day                                           Secondary                                        Tape              Note: If total out of pocket is more than $50.00 please contact the patient before processing order.     OK to forward to covered supplier.    Electronically Signed Physician: STACI NOVOA                         Date: 2021

## 2021-12-01 NOTE — PROGRESS NOTES
Follow up Vascular Visit       Date of Service:12/01/21      Chief Complaint: right leg venous stasis ulcer; severe; chronic; BLE edema; chronic      Pt returns to [facility] with regards to their right leg severe venous stasis ulcer with recurrent infections; BLE edema chronic.  They arrive today alone. They are currently using dilute hibiclens; vaseline or zinc oxide to the periwound skin; Sorbact contact layer and ABD or baby diapers to the wounds. This is being done by home care; family or lymphedema therapy daily. They are using tubular compression and short stretch; and lymphedema pump on the left leg and abdomen daily for compression. Pertinent medical history includes multiple sclerosis, obesity, acquired lymphedema following lymphectomies, and venous insufficiency s/p Right Lower Extremity interventions. She has a history of recurrent lower limb cellulitis and  Left Lower Extremity ulcers which have healed. She also has an altered gait secondary to bilateral valgus deformity, left ankle contracture and bilateral flat feel in conjunction with multiple sclerosis. She is also following with ID; has been treated with multiple antibiotic for recent severe cellulitis infection in July; they have been off all antibiotics for 4 weeks now. They are feeling well today. Denies fevers, chills. No shortness of breath. She continues to follow with ID for her recurrent infections. We obtained ANSHUL this was normal; we obtained venous insufficiency and this was normal; results were d/w her today and answered all questions. She is attending lymphedema therapy. I had contacted her pcp to consider adding gabapentin or lyrica to her regimen as the hydrocodone was ineffective this did occur taking gabapentin with good relief; taking minimal hydrocodone now.     Allergies:   Allergies   Allergen Reactions     Other Environmental Allergy Anaphylaxis     Bees/Wasps Stings     Adhesive Tape Unknown      "Adhesive [Mecrylate] Unknown     Other reaction(s): sores     Vicodin [Hydrocodone-Acetaminophen] Nausea and Vomiting and Hives       Medications:   Current Outpatient Medications:      aspirin 81 mg chewable tablet, Take 81 mg by mouth every evening , Disp: , Rfl:      buPROPion (WELLBUTRIN SR) 150 MG 12 hr tablet, Take 150 mg by mouth every morning , Disp: , Rfl:      cholecalciferol, vitamin D3, 1,000 unit tablet, [CHOLECALCIFEROL, VITAMIN D3, 1,000 UNIT TABLET] Take 2,000 Units by mouth daily., Disp: , Rfl:      citalopram (CELEXA) 40 MG tablet, Take 40 mg by mouth every morning , Disp: , Rfl:      furosemide (LASIX) 20 MG tablet, Take 20 mg by mouth 2 times daily AM and afternoon (4PM), Disp: , Rfl:      HYDROcodone-acetaminophen (NORCO) 5-325 MG tablet, TAKE 1 TABLET BY MOUTH EVERY 8 HOURS AS NEEDED FOR 4 DAYS, Disp: , Rfl:      multivitamin therapeutic (THERAGRAN) tablet, [MULTIVITAMIN THERAPEUTIC (THERAGRAN) TABLET] Take 1 tablet by mouth daily., Disp: , Rfl:      multivitamin w/minerals (CENTRUM ADULTS) tablet, as directed, Disp: , Rfl:      naproxen sodium (ALEVE) 220 MG tablet, Take 440 mg by mouth daily as needed , Disp: , Rfl:      pantoprazole (PROTONIX) 40 MG tablet, [PANTOPRAZOLE (PROTONIX) 40 MG TABLET] Take 40 mg by mouth daily., Disp: , Rfl:      potassium chloride ER (KLOR-CON M) 20 MEQ CR tablet, Take 20 mEq by mouth every evening, Disp: , Rfl:      pregabalin (LYRICA) 75 MG capsule, 1 capsule, Disp: , Rfl:      rOPINIRole (REQUIP) 1 MG tablet, Take 1 mg by mouth At Bedtime, Disp: , Rfl:      simvastatin (ZOCOR) 40 MG tablet, [SIMVASTATIN (ZOCOR) 40 MG TABLET] Take 40 mg by mouth bedtime., Disp: , Rfl:      Skin Protectants, Misc. (INTERDRY 10\"X36\") SHEE, Externally apply 7 Units topically once as needed (change daily in groin rash/fold), Disp: 7 each, Rfl: 3     spironolactone (ALDACTONE) 25 MG tablet, [SPIRONOLACTONE (ALDACTONE) 25 MG TABLET] Take 25 mg by mouth daily., Disp: , Rfl: " "    History:   Past Medical History:   Diagnosis Date     Ankle contracture, left      Class 3 severe obesity due to excess calories without serious comorbidity with body mass index (BMI) of 45.0 to 49.9 in adult (H)      Combined gastric and duodenal ulcer      Depression      Dyslipidemia      Edema      Gait abnormality      GERD (gastroesophageal reflux disease)      Lymphedema of both lower extremities      Melanoma (H)     left upper arm     MS (multiple sclerosis) (H)      RLS (restless legs syndrome)      Skin ulcer of left lower leg (H)      Valgus deformity of both feet      Vitamin D deficiency        Physical Exam:    /60   Pulse 75   Ht 5' 1\" (1.549 m)   Wt 249 lb (112.9 kg)   SpO2 95%   BMI 47.05 kg/m      General:  Patient presents to clinic in no apparent distress.  Head: normocephalic atraumatic  Psychiatric:  Alert and oriented x3.   Respiratory: unlabored breathing; no cough  Integumentary:  Skin is uniformly warm, dry and pink.    Wound #1 Location: right leg  Size: 15L x 45W x 0.1depth.  No sinus tract present, Wound base: slough; irregular  No undermining present. Wound is full thickness. There is heavy drainage. Periwound: no denudement, erythema, induration, maceration or warmth.      PICC Single Lumen 09/02/21 Right Brachial vein lateral (Active)   Number of days: 90       PICC Single Lumen 10/13/21 Left Basilic (Active)   Number of days: 49       Wound Leg Other (comment);Ulceration (Active)   Number of days: 133       VASC Wound right lower leg (Active)   Pre Size Length 15 12/01/21 1400   Pre Size Width 45 12/01/21 1400   Pre Size Depth 0.1 12/01/21 1400   Pre Total Sq cm 675 12/01/21 1400   Number of days: 217            Circumferential volume measures:      Circumferential Measures 6/10/2021 6/16/2021 6/22/2021 6/29/2021 7/30/2021   Right just above MTP 25.5 23.6 24.8 24 23.3   Right Ankle 25.5 25.4 27 26 25.2   Right Widest Calf 61 56.6 58 59.5 61.0   Right Thigh Up 10cm 78 " - - - 78.8   Right Knee to Ankle - - - - -   Left - just above MTP 23.5 22.6 23.8 23 24   Left Ankle 28 24.2 26.5 25.5 25   Left Widest Calf 54 53.6 54.6 53.5 51.5   Left Thigh Up 10cm 71 - - 68.5 77.2   Left Knee to Ankle - - - - -       Labs:    I personally reviewed the following lab results today and those on care everywhere    CRP   Date Value Ref Range Status   10/11/2021 2.8 (H) 0.0-<0.8 mg/dL Final     C-Reactive Protein High Sensitivity   Date Value Ref Range Status   10/20/2021 31.7 (H) 0.0 - 3.0 mg/L Final     Comment:     Low risk < 1.0 mg/L  Average risk 1.0 to 3.0 mg/L  High risk > 3.0 mg/L  Acute inflamation > 10.0 mg/L      Erythrocyte Sedimentation Rate   Date Value Ref Range Status   10/20/2021 55 (H) 0 - 20 mm/hr Final      Last Renal Panel:  Sodium   Date Value Ref Range Status   11/10/2021 142 136 - 145 mmol/L Final     Potassium   Date Value Ref Range Status   11/10/2021 4.4 3.5 - 5.0 mmol/L Final     Chloride   Date Value Ref Range Status   11/10/2021 104 98 - 107 mmol/L Final     Carbon Dioxide (CO2)   Date Value Ref Range Status   11/10/2021 24 22 - 31 mmol/L Final     Anion Gap   Date Value Ref Range Status   11/10/2021 14 5 - 18 mmol/L Final     Glucose   Date Value Ref Range Status   11/10/2021 107 70 - 125 mg/dL Final     Urea Nitrogen   Date Value Ref Range Status   11/10/2021 20 8 - 28 mg/dL Final     Creatinine   Date Value Ref Range Status   11/10/2021 0.86 0.60 - 1.10 mg/dL Final     GFR Estimate   Date Value Ref Range Status   11/10/2021 67 >60 mL/min/1.73m2 Final     Comment:     As of July 11, 2021, eGFR is calculated by the CKD-EPI creatinine equation, without race adjustment. eGFR can be influenced by muscle mass, exercise, and diet. The reported eGFR is an estimation only and is only applicable if the renal function is stable.   09/26/2020 >60 >60 mL/min/1.73m2 Final     Calcium   Date Value Ref Range Status   11/10/2021 9.5 8.5 - 10.5 mg/dL Final     Albumin   Date Value Ref  Range Status   08/02/2021 3.4 (L) 3.5 - 5.0 g/dL Final      Lab Results   Component Value Date    WBC 9.7 10/20/2021     Lab Results   Component Value Date    RBC 3.94 10/20/2021     Lab Results   Component Value Date    HGB 10.2 10/20/2021     Lab Results   Component Value Date    HCT 33.2 10/20/2021     No components found for: MCT  Lab Results   Component Value Date    MCV 84 10/20/2021     Lab Results   Component Value Date    MCH 25.9 10/20/2021     Lab Results   Component Value Date    MCHC 30.7 10/20/2021     Lab Results   Component Value Date    RDW 14.6 10/20/2021     Lab Results   Component Value Date     10/20/2021      Lab Results   Component Value Date    A1C 5.9 06/23/2016      No results found for: TSH   No results found for: VITDT                Impression:  Encounter Diagnoses   Name Primary?     Chronic ulcer of right leg, with fat layer exposed (H) Yes     Acquired lymphedema of leg      Recurrent infection of skin      Scar condition and fibrosis of skin      Morbid obesity (H)      Venous insufficiency of both lower extremities      Venous hypertension of lower extremity, bilateral       10/8/21            11/8/21        12/1/21          Are any of these wounds new today: No; Location: na    Assessment/Plan:          1. Debridement: After discussion of risk factors and verbal consent was obtained 2% Lidocaine HCL jelly was applied, under clean conditions, the RLE ulceration(s) were debrided using currette. Devitalized and nonviable tissue, along with any fibrin and slough, was removed to improve granulation tissue formation, stimulate wound healing, decrease overall bacteria load, disrupt biofilm formation and decrease edge senescence.  Total excisional debridement was 675 sq cm from the epidermis/dermis area and into the subcutaneous tissue with a depth of 0.1-0.2 cm.   Ulcers were improved afterwards and .  Measures were unchanged after debridement.       2.  Wound treatment:  wound treatment will include irrigation and dressings to promote autolytic debridement which will include:will continue to wash with dilute hibiclens; triad paste to the periwound skin; sorbact; ABD; rolled gauze; change twice per day due to drainage amounts; continue to follow with ID as scheduled Stable 15           3. Edema: continue to work with lymphedema therapy; applied tubular compression today size G from ankle to knee and then short stretch; tolerated well; continue lymphedema pump 1-2 times per day on the left leg and abdomen; she does not tolerate this on the right leg. The compression wraps were applied today in clinic. Stable            4. Nutrition: focus on low sodium and weight loss           5. Offloading: na           6. Right leg pain: she was started on gabapentin; this is helping; taking minimal hydrocodone now; ANSHUL was normal     Patient will follow up with me in 4 weeks for reevaluation. They were instructed to call the clinic sooner with any signs or symptoms of infection or any further questions/concerns. Answered all questions.          Staci Junior DNP, RN, CNP, CWOCN, CFCN, CLT  [facility]   [phone]        This note was electronically signed by Staci Junior NP

## 2021-12-01 NOTE — PATIENT INSTRUCTIONS
"ANSHUL was normal adequate blood flow in the legs and feet for healing    Venous insufficiency study was normal valves are working in the veins            Wound Care Instructions    TWICE PER DAY and as needed, Cleanse your right leg wound(s) with Dilute hibiclens 30cc in 500cc NS. OK to rinse with saline after     Pat Dry with non-sterile gauze    Apply Vaseline or zinc oxide or Triad paste to the skin surrounding the wounds to protect from drainage    Primary Dressing: Apply sorbact into/onto the wounds    Secondary dressing: Cover with ABD or baby diapers    Secure with non-sterile roll gauze (4\" x 75\" roll) and tape (1\" roll tape) as needed; avoid adhesive directly on the skin    Compression: tubular compression and short stretch    Begin using the lymphedema pump on the left leg and abdomen    Elevate the legs throughout the day    It is not ok to get your wound wet in the bath or shower    SEEK MEDICAL CARE IF:    You have an increase in swelling, pain, or redness around the wound.    You have an increase in the amount of pus coming from the wound.    There is a bad smell coming from the wound.    The wound appears to be worsening/enlarging    You have a fever greater than 101.5 F      It is ok to continue current wound care treatment/products for the next 2-3 days until new wound care supplies are ordered and arrive. If longer than this please contact our office at [phone].    "

## 2021-12-10 ENCOUNTER — TELEPHONE (OUTPATIENT)
Dept: INFECTIOUS DISEASES | Facility: CLINIC | Age: 76
End: 2021-12-10
Payer: COMMERCIAL

## 2021-12-10 NOTE — TELEPHONE ENCOUNTER
Dr Vargas    Patient calling to change appt on 12/13 Monday from in clinic to video.    States that she does not have a car at this time. Not sure when she will be able to come in/reschedule.    If needs in clinic, may have to try for end of December/Beginning January. She can be reached @ [phone]    Otherwise, if ok for video, ok to leave as is, no return call needed.

## 2021-12-13 ENCOUNTER — VIRTUAL VISIT (OUTPATIENT)
Dept: INFECTIOUS DISEASES | Facility: CLINIC | Age: 76
End: 2021-12-13
Payer: COMMERCIAL

## 2021-12-13 DIAGNOSIS — T14.8XXA NON-HEALING NON-SURGICAL WOUND: ICD-10-CM

## 2021-12-13 DIAGNOSIS — L03.115 CELLULITIS OF RIGHT LOWER EXTREMITY: Primary | ICD-10-CM

## 2021-12-13 DIAGNOSIS — T14.8XXA WOUND INFECTION: ICD-10-CM

## 2021-12-13 DIAGNOSIS — A49.8 PSEUDOMONAS INFECTION: ICD-10-CM

## 2021-12-13 DIAGNOSIS — L08.9 WOUND INFECTION: ICD-10-CM

## 2021-12-13 PROCEDURE — 99213 OFFICE O/P EST LOW 20 MIN: CPT | Mod: GT | Performed by: INTERNAL MEDICINE

## 2021-12-13 RX ORDER — MOXIFLOXACIN HYDROCHLORIDE 400 MG/1
400 TABLET ORAL DAILY
Qty: 14 TABLET | Refills: 0 | Status: SHIPPED | OUTPATIENT
Start: 2021-12-13 | End: 2021-12-27

## 2021-12-13 RX ORDER — DOXYCYCLINE 100 MG/1
100 CAPSULE ORAL 2 TIMES DAILY
Qty: 56 CAPSULE | Refills: 0 | Status: SHIPPED | OUTPATIENT
Start: 2021-12-13 | End: 2022-01-10

## 2021-12-13 NOTE — PATIENT INSTRUCTIONS
Ramon Johnson  Thank you for waiting for the clinic visit today and apologize about the delay in seeing you.  We discussed that due to increased life in order, we will monitor over the next few days and if it does not improve, start empiric doxycycline and moxifloxacin which have been ordered  Continue to follow-up with wound care and lymphedema  Dermatology appointment would be very helpful to evaluate for diagnosis like pyoderma gangrenosum

## 2021-12-13 NOTE — PROGRESS NOTES
Elizabeth is a 76 year old who is being evaluated via a billable video visit.      How would you like to obtain your AVS? MyChart  If the video visit is dropped, the invitation should be resent by: Send to e-mail at: [email or web address]  Will anyone else be joining your video visit? No      Video Start Time: 4:25 pm  Video-Visit Details    Type of service:  Video Visit    Video End Time:4:50 pm    Originating Location (pt. Location): Home    Distant Location (provider location):  [facility]     Platform used for Video Visit: [employer]

## 2021-12-13 NOTE — PROGRESS NOTES
INFECTIOUS DISEASE [facility] FOLLOW UP NOTE    Date: 12/13/2021   Patient Name: Elizabeth Kelley   YOB: 1945  MRN: [id number]        ASSESSMENT:    RLE cellulitis with weeping ulcers, pseudomonas in wound.,  MRSA infection, on IV vancomycin since 9/30/2021, and on iv meropenem since ~8/6 with some improvement.  Has completed IV antibiotic therapy  Pseudomonas is multidrug resistant    Wbc and CRP stable.     Concern about noninfectious underlying etiology such as pyoderma.  Dermatology referral requested and pending.    Redness at PICC site on R.  Switch to left side, with hypoallergenic dressing    Open wounds have significantly improved and slough has decreased  Patient has completed antibiotic therapy      Platform used for Video Visit: [employer]      Photo during clinic visit on 12/13/2021       Photo 11/8/2021                    Photos on 10/18/2021:            Inguinal candidiasis- resolved        PLAN:    Due to increased slough, odor, drainage, empiric doxycycline and moxifloxacin if this continues over the next few days  Patient is dermatology clinic appointment scheduled   Seeing wound care and lymphedema management.   Please see if Primary can help with the pain (appears to be related to nerve/wound healing). Dermatology appointment would be very helpful for additional treatment options.   Compression machine    Follow up as needed    Kathe Vargas MD  [facility]  [phone]      .virtual          ______________________________________________________________________    SUBJECTIVE / INTERVAL HISTORY: Elizabeth Kelley returns for follow up of RLE cellulitis.  Slough improved, painful.  Wounds improving, healed      Pain is decreased, 2 areas with pain, and worse with being on feet    Awaiting dermatology consultation, scheduled    Previous note:    Pain improved, wounds are less deep in certain areas.  Continues to have some slough  Wound care  following  Tolerating vancomycin and meropenem    9/30:    Continues on meropenem. Tolerating this and infusions. No rash or diarrhea. Difficulty with tape, but stable. Notices more pain, especially in am. Pain awakens her. Takes tylenol 1gm in am and again right before bed. Not on narcotic pain meds. Rarely takes alleve. Pain at other times is tolerable.  Sees wound care every 2 weeks and dresses her own wounds bid with nonadherent dressing.    ROS: All other systems negative except as listed above.      Current Outpatient Medications:      aspirin 81 mg chewable tablet, Take 81 mg by mouth every evening , Disp: , Rfl:      buPROPion (WELLBUTRIN SR) 150 MG 12 hr tablet, Take 150 mg by mouth every morning , Disp: , Rfl:      cholecalciferol, vitamin D3, 1,000 unit tablet, [CHOLECALCIFEROL, VITAMIN D3, 1,000 UNIT TABLET] Take 2,000 Units by mouth daily., Disp: , Rfl:      citalopram (CELEXA) 40 MG tablet, Take 40 mg by mouth every morning , Disp: , Rfl:      furosemide (LASIX) 20 MG tablet, Take 20 mg by mouth 2 times daily AM and afternoon (4PM), Disp: , Rfl:      HYDROcodone-acetaminophen (NORCO) 5-325 MG tablet, TAKE 1 TABLET BY MOUTH EVERY 8 HOURS AS NEEDED FOR 4 DAYS, Disp: , Rfl:      multivitamin therapeutic (THERAGRAN) tablet, [MULTIVITAMIN THERAPEUTIC (THERAGRAN) TABLET] Take 1 tablet by mouth daily., Disp: , Rfl:      multivitamin w/minerals (CENTRUM ADULTS) tablet, as directed, Disp: , Rfl:      naproxen sodium (ALEVE) 220 MG tablet, Take 440 mg by mouth daily as needed , Disp: , Rfl:      pantoprazole (PROTONIX) 40 MG tablet, [PANTOPRAZOLE (PROTONIX) 40 MG TABLET] Take 40 mg by mouth daily., Disp: , Rfl:      potassium chloride ER (KLOR-CON M) 20 MEQ CR tablet, Take 20 mEq by mouth every evening, Disp: , Rfl:      pregabalin (LYRICA) 75 MG capsule, 1 capsule, Disp: , Rfl:      rOPINIRole (REQUIP) 1 MG tablet, Take 1 mg by mouth At Bedtime, Disp: , Rfl:      simvastatin (ZOCOR) 40 MG tablet, [SIMVASTATIN  "(ZOCOR) 40 MG TABLET] Take 40 mg by mouth bedtime., Disp: , Rfl:      Skin Protectants, Misc. (INTERDRY 10\"X36\") TAYLAE, Elda apply 7 Units topically once as needed (change daily in groin rash/fold), Disp: 7 each, Rfl: 3     spironolactone (ALDACTONE) 25 MG tablet, [SPIRONOLACTONE (ALDACTONE) 25 MG TABLET] Take 25 mg by mouth daily., Disp: , Rfl:       OBJECTIVE:  There were no vitals taken for this visit.      GEN: No acute distress.    RESPIRATORY:  Normal breathing pattern.   EXTREMITIES: chronic lymphedema  SKIN/HAIR/NAILS: Inguinal candidiasis previous note  See above for photo from 12/13/2021   Right lower extremity:  Slough has significantly reduced. No undermining, no fluctuance or tunneling. Open ulcerations are tender  11/8/2021                  Picture from previous visit:       New Pictures from 9/30/21:  Anterior view, right leg      Medial view, right leg      Lateral view, right leg      Photos from 10/18/2021:                  Pertinent labs:    CRP   Date Value Ref Range Status   10/11/2021 2.8 (H) 0.0-<0.8 mg/dL Final     C-Reactive Protein High Sensitivity   Date Value Ref Range Status   10/20/2021 31.7 (H) 0.0 - 3.0 mg/L Final     Comment:     Low risk < 1.0 mg/L  Average risk 1.0 to 3.0 mg/L  High risk > 3.0 mg/L  Acute inflamation > 10.0 mg/L     Last Comprehensive Metabolic Panel:  Sodium   Date Value Ref Range Status   11/10/2021 142 136 - 145 mmol/L Final     Potassium   Date Value Ref Range Status   11/10/2021 4.4 3.5 - 5.0 mmol/L Final     Chloride   Date Value Ref Range Status   11/10/2021 104 98 - 107 mmol/L Final     Carbon Dioxide (CO2)   Date Value Ref Range Status   11/10/2021 24 22 - 31 mmol/L Final     Anion Gap   Date Value Ref Range Status   11/10/2021 14 5 - 18 mmol/L Final     Glucose   Date Value Ref Range Status   11/10/2021 107 70 - 125 mg/dL Final     Urea Nitrogen   Date Value Ref Range Status   11/10/2021 20 8 - 28 mg/dL Final     Creatinine   Date Value Ref Range " Status   11/10/2021 0.86 0.60 - 1.10 mg/dL Final     GFR Estimate   Date Value Ref Range Status   11/10/2021 67 >60 mL/min/1.73m2 Final     Comment:     As of July 11, 2021, eGFR is calculated by the CKD-EPI creatinine equation, without race adjustment. eGFR can be influenced by muscle mass, exercise, and diet. The reported eGFR is an estimation only and is only applicable if the renal function is stable.   09/26/2020 >60 >60 mL/min/1.73m2 Final     Calcium   Date Value Ref Range Status   11/10/2021 9.5 8.5 - 10.5 mg/dL Final     CBC RESULTS:   Recent Labs   Lab Test 09/07/21  1000   WBC 9.4   RBC 4.12   HGB 10.9*   HCT 35.0   MCV 85   MCH 26.5   MCHC 31.1*   RDW 14.6          MICROBIOLOGY DATA:    T Cell Subset:  WBC Count   Date Value Ref Range Status   10/20/2021 9.7 4.0 - 11.0 10e3/uL Final     % Lymphocytes   Date Value Ref Range Status   10/20/2021 11 % Final         RADIOLOGY:    No results found for this or any previous visit (from the past 744 hour(s)).   Collected Updated Procedure Result Status    10/18/2021 1511 10/18/2021 1607 Wound Aerobic Bacterial Culture Routine with Gram Stain [[id number]]   Wound from Leg, Below Knee, Right    In process Component Value   No component results              08/02/2021 1531 08/04/2021 1340 Wound Aerobic Bacterial Culture Routine [[id number]]    (Abnormal)   Wound from Leg, Below Knee, Right    Final result Component Value   Culture 4+ Pseudomonas aeruginosaAbnormal          Susceptibility     Pseudomonas aeruginosa     MARYBEL     Aztreonam >16 ug/mL Resistant     Cefepime 8 ug/mL Susceptible     Ceftazidime >16 ug/mL Resistant     Ciprofloxacin >2 ug/mL Resistant     Gentamicin 8 ug/mL Intermediate     Levofloxacin >4 ug/mL Resistant     Meropenem 4 ug/mL Intermediate     Piperacillin/Tazobactam >64/4 ug/mL Resistant     Tobramycin <=2 ug/mL Susceptible                  07/21/2021 2238 07/21/2021 2332 Asymptomatic COVID-19 Virus (Coronavirus) by PCR  Nasopharyngeal [[id number]]    Swab from Nasopharyngeal    Final result Component Value   No component results           07/21/2021 2238 07/21/2021 2332 SARS-COV2 (COVID-19) Virus RT-PCR [[id number]]    Swab from Nasopharyngeal    Final result Component Value   SARS CoV2 PCR Negative   NEGATIVE: SARS-CoV-2 (COVID-19) RNA not detected, presumed negative.           07/21/2021 1855 07/26/2021 2231 Blood Culture Peripheral Blood [[id number]]   Peripheral Blood    Final result Component Value   Culture No Growth              07/21/2021 1855 07/26/2021 2231 Blood Culture Peripheral Blood [[id number]]   Peripheral Blood    Final result Component Value   Culture No Growth

## 2021-12-14 ENCOUNTER — TELEPHONE (OUTPATIENT)
Dept: INFECTIOUS DISEASES | Facility: CLINIC | Age: 76
End: 2021-12-14
Payer: COMMERCIAL

## 2021-12-14 NOTE — TELEPHONE ENCOUNTER
[facility] called to clarify if provider aware of contraindication for moxifloxacin and citalopram. Elevation in QTC.    Dr Vargas informed and will discontinue order. Lilibeth confirmed received update and will call patient.

## 2022-01-09 ENCOUNTER — APPOINTMENT (OUTPATIENT)
Dept: CT IMAGING | Facility: HOSPITAL | Age: 77
End: 2022-01-09
Attending: FAMILY MEDICINE
Payer: COMMERCIAL

## 2022-01-09 ENCOUNTER — HOSPITAL ENCOUNTER (EMERGENCY)
Facility: HOSPITAL | Age: 77
Discharge: HOME | End: 2022-01-09
Attending: FAMILY MEDICINE | Admitting: FAMILY MEDICINE
Payer: COMMERCIAL

## 2022-01-09 VITALS
OXYGEN SATURATION: 97 % | HEIGHT: 61 IN | WEIGHT: 252 LBS | BODY MASS INDEX: 47.58 KG/M2 | TEMPERATURE: 97.8 F | SYSTOLIC BLOOD PRESSURE: 174 MMHG | DIASTOLIC BLOOD PRESSURE: 67 MMHG | HEART RATE: 72 BPM | RESPIRATION RATE: 20 BRPM

## 2022-01-09 DIAGNOSIS — S01.81XA LACERATION OF FOREHEAD, INITIAL ENCOUNTER: ICD-10-CM

## 2022-01-09 DIAGNOSIS — S61.412A SKIN TEAR OF LEFT HAND WITHOUT COMPLICATION, INITIAL ENCOUNTER: ICD-10-CM

## 2022-01-09 PROCEDURE — 250N000009 HC RX 250: Performed by: FAMILY MEDICINE

## 2022-01-09 PROCEDURE — 12032 INTMD RPR S/A/T/EXT 2.6-7.5: CPT

## 2022-01-09 PROCEDURE — 70450 CT HEAD/BRAIN W/O DYE: CPT

## 2022-01-09 PROCEDURE — 99284 EMERGENCY DEPT VISIT MOD MDM: CPT | Mod: 25

## 2022-01-09 RX ORDER — BACITRACIN ZINC 500 [USP'U]/G
OINTMENT TOPICAL ONCE
Status: COMPLETED | OUTPATIENT
Start: 2022-01-09 | End: 2022-01-09

## 2022-01-09 RX ADMIN — BACITRACIN ZINC: 500 OINTMENT TOPICAL at 20:46

## 2022-01-09 ASSESSMENT — ENCOUNTER SYMPTOMS: WOUND: 1

## 2022-01-09 ASSESSMENT — MIFFLIN-ST. JEOR: SCORE: 1580.44

## 2022-01-09 NOTE — NURSING NOTE
Patient came in for scheduled nurse visit c/o intense itching and hives. No other symptoms of allergic reaction. She stated she finished the clindamycin and has two left of the levaquin. Maria G Yeboah CNP, came into the room and told the patient to stop taking the levaquin. Was instructed to start taking tylenol, but not to take benadryl. Was told she can take zyrtec instead.    Negative

## 2022-01-10 ENCOUNTER — VIRTUAL VISIT (OUTPATIENT)
Dept: INFECTIOUS DISEASES | Facility: CLINIC | Age: 77
End: 2022-01-10
Payer: COMMERCIAL

## 2022-01-10 ENCOUNTER — DOCUMENTATION ONLY (OUTPATIENT)
Dept: ADMINISTRATIVE | Facility: CLINIC | Age: 77
End: 2022-01-10

## 2022-01-10 DIAGNOSIS — L03.115 CELLULITIS OF RIGHT LOWER EXTREMITY: Primary | ICD-10-CM

## 2022-01-10 PROCEDURE — 99213 OFFICE O/P EST LOW 20 MIN: CPT | Mod: GT | Performed by: INTERNAL MEDICINE

## 2022-01-10 NOTE — PROGRESS NOTES
Elizabeth is a 76 year old who is being evaluated via a billable video visit.      How would you like to obtain your AVS? MyChart  If the video visit is dropped, the invitation should be resent by: Text to cell phone: [phone]  Will anyone else be joining your video visit? No    Video Start Time: 2:47 pm  Video-Visit Details    Type of service:  Video Visit    Video End Time:3:07 PM    Originating Location (pt. Location): Home    Distant Location (provider location):  [facility]     Platform used for Video Visit: [facility]                   INFECTIOUS DISEASE [facility] FOLLOW UP NOTE- VIRTUAL VISIT    Date: 01/10/2022   Patient Name: Elizabeth Kelley   YOB: 1945  MRN: [id number]        ASSESSMENT:    Fall on 1/9/2022, laceration of scalp.  ED notes reviewed, sutured.  Denies headache, loss of consciousness.    RLE cellulitis with weeping ulcers, pseudomonas in wound.,  MRSA infection, on IV vancomycin since 9/30/2021, and on iv meropenem since ~8/6 with some improvement.  Has completed IV antibiotic therapy  Pseudomonas is multidrug resistant  Improving on doxycycline    Wbc and CRP stable.  Previously    Concern about noninfectious underlying etiology such as pyoderma.  Dermatology referral requested and pending.    Redness at PICC site on R.  Switch to left side, with hypoallergenic dressing    Open wounds have significantly improved and slough has decreased  Patient has completed antibiotic therapy    Photos currently 1/10/2022 above    Previous photos 12/13/2021.      Photo 11/8/2021                    Photos on 10/18/2021:            Inguinal candidiasis- resolved        PLAN:    Due to increased slough, odor, drainage, empiric doxycycline  Moxifloxacin was ordered and is not covered.  Patient is improving on doxycycline  Has follow-up wound care appointment.  Patient is dermatology clinic appointment scheduled   Seeing wound care and lymphedema management.   Please  see if Primary can help with the pain (appears to be related to nerve/wound healing). Dermatology appointment would be very helpful for additional treatment options.   Compression machine per vascular surgery    Follow up in 2 to 3 weeks.    Kathe Vargas MD  [facility]  [phone]      Total Time Spent 30 minutes with >50% of the time spent in counseling, education and care coordination.             ______________________________________________________________________    SUBJECTIVE / INTERVAL HISTORY:   Virtual visit, had a fall on 1/9/2022.  Appears mechanical according to the patient  Laceration, went to ED.  Notes reviewed  Leg wound improving, patient tolerating doxycycline    Previous note: Elizabeth Kelley returns for follow up of RLE cellulitis.  Slough improved, painful.  Wounds improving, healed      Pain is decreased, 2 areas with pain, and worse with being on feet    Awaiting dermatology consultation, scheduled    Previous note:    Pain improved, wounds are less deep in certain areas.  Continues to have some slough  Wound care following  Tolerating vancomycin and meropenem    9/30:    Continues on meropenem. Tolerating this and infusions. No rash or diarrhea. Difficulty with tape, but stable. Notices more pain, especially in am. Pain awakens her. Takes tylenol 1gm in am and again right before bed. Not on narcotic pain meds. Rarely takes alleve. Pain at other times is tolerable.  Sees wound care every 2 weeks and dresses her own wounds bid with nonadherent dressing.    ROS: All other systems negative except as listed above.      Current Outpatient Medications:      aspirin 81 mg chewable tablet, Take 81 mg by mouth every evening , Disp: , Rfl:      buPROPion (WELLBUTRIN SR) 150 MG 12 hr tablet, Take 150 mg by mouth every morning , Disp: , Rfl:      cholecalciferol, vitamin D3, 1,000 unit tablet, [CHOLECALCIFEROL, VITAMIN D3, 1,000 UNIT TABLET] Take 2,000 Units by mouth  "daily., Disp: , Rfl:      citalopram (CELEXA) 40 MG tablet, Take 40 mg by mouth every morning , Disp: , Rfl:      doxycycline monohydrate (MONODOX) 100 MG capsule, Take 1 capsule (100 mg) by mouth 2 times daily for 28 days, Disp: 56 capsule, Rfl: 0     furosemide (LASIX) 20 MG tablet, Take 20 mg by mouth 2 times daily AM and afternoon (4PM), Disp: , Rfl:      HYDROcodone-acetaminophen (NORCO) 5-325 MG tablet, TAKE 1 TABLET BY MOUTH EVERY 8 HOURS AS NEEDED FOR 4 DAYS, Disp: , Rfl:      multivitamin therapeutic (THERAGRAN) tablet, [MULTIVITAMIN THERAPEUTIC (THERAGRAN) TABLET] Take 1 tablet by mouth daily., Disp: , Rfl:      multivitamin w/minerals (CENTRUM ADULTS) tablet, as directed, Disp: , Rfl:      naproxen sodium (ALEVE) 220 MG tablet, Take 440 mg by mouth daily as needed , Disp: , Rfl:      pantoprazole (PROTONIX) 40 MG tablet, [PANTOPRAZOLE (PROTONIX) 40 MG TABLET] Take 40 mg by mouth daily., Disp: , Rfl:      potassium chloride ER (KLOR-CON M) 20 MEQ CR tablet, Take 20 mEq by mouth every evening, Disp: , Rfl:      pregabalin (LYRICA) 75 MG capsule, 1 capsule, Disp: , Rfl:      rOPINIRole (REQUIP) 1 MG tablet, Take 1 mg by mouth At Bedtime, Disp: , Rfl:      simvastatin (ZOCOR) 40 MG tablet, [SIMVASTATIN (ZOCOR) 40 MG TABLET] Take 40 mg by mouth bedtime., Disp: , Rfl:      Skin Protectants, Misc. (INTERDRY 10\"X36\") SHEE, Externally apply 7 Units topically once as needed (change daily in groin rash/fold), Disp: 7 each, Rfl: 3     spironolactone (ALDACTONE) 25 MG tablet, [SPIRONOLACTONE (ALDACTONE) 25 MG TABLET] Take 25 mg by mouth daily., Disp: , Rfl:   No current facility-administered medications for this visit.      OBJECTIVE:  There were no vitals taken for this visit.  Photos taken during video visit today above  Previous note:  GEN: No acute distress.    RESPIRATORY:  Normal breathing pattern.   EXTREMITIES: chronic lymphedema  SKIN/HAIR/NAILS: Inguinal candidiasis previous note  See above for photo from " 12/13/2021   Right lower extremity:  Slough has significantly reduced. No undermining, no fluctuance or tunneling. Open ulcerations are tender  11/8/2021                  Picture from previous visit:       New Pictures from 9/30/21:  Anterior view, right leg      Medial view, right leg      Lateral view, right leg      Photos from 10/18/2021:                  Pertinent labs:    CRP   Date Value Ref Range Status   10/11/2021 2.8 (H) 0.0-<0.8 mg/dL Final     C-Reactive Protein High Sensitivity   Date Value Ref Range Status   10/20/2021 31.7 (H) 0.0 - 3.0 mg/L Final     Comment:     Low risk < 1.0 mg/L  Average risk 1.0 to 3.0 mg/L  High risk > 3.0 mg/L  Acute inflamation > 10.0 mg/L     Last Comprehensive Metabolic Panel:  Sodium   Date Value Ref Range Status   11/10/2021 142 136 - 145 mmol/L Final     Potassium   Date Value Ref Range Status   11/10/2021 4.4 3.5 - 5.0 mmol/L Final     Chloride   Date Value Ref Range Status   11/10/2021 104 98 - 107 mmol/L Final     Carbon Dioxide (CO2)   Date Value Ref Range Status   11/10/2021 24 22 - 31 mmol/L Final     Anion Gap   Date Value Ref Range Status   11/10/2021 14 5 - 18 mmol/L Final     Glucose   Date Value Ref Range Status   11/10/2021 107 70 - 125 mg/dL Final     Urea Nitrogen   Date Value Ref Range Status   11/10/2021 20 8 - 28 mg/dL Final     Creatinine   Date Value Ref Range Status   11/10/2021 0.86 0.60 - 1.10 mg/dL Final     GFR Estimate   Date Value Ref Range Status   11/10/2021 67 >60 mL/min/1.73m2 Final     Comment:     As of July 11, 2021, eGFR is calculated by the CKD-EPI creatinine equation, without race adjustment. eGFR can be influenced by muscle mass, exercise, and diet. The reported eGFR is an estimation only and is only applicable if the renal function is stable.   09/26/2020 >60 >60 mL/min/1.73m2 Final     Calcium   Date Value Ref Range Status   11/10/2021 9.5 8.5 - 10.5 mg/dL Final     CBC RESULTS:   Recent Labs   Lab Test 09/07/21  1000   WBC 9.4    RBC 4.12   HGB 10.9*   HCT 35.0   MCV 85   MCH 26.5   MCHC 31.1*   RDW 14.6          MICROBIOLOGY DATA:    T Cell Subset:  WBC Count   Date Value Ref Range Status   10/20/2021 9.7 4.0 - 11.0 10e3/uL Final     % Lymphocytes   Date Value Ref Range Status   10/20/2021 11 % Final         RADIOLOGY:    Recent Results (from the past 744 hour(s))   Head CT w/o contrast    Narrative    EXAM: CT HEAD W/O CONTRAST  LOCATION: [facility]  DATE/TIME: 1/9/2022 8:09 PM    INDICATION: Head injury  COMPARISON: None.  TECHNIQUE: Routine CT Head without IV contrast. Multiplanar reformats. Dose reduction techniques were used.    FINDINGS:  INTRACRANIAL CONTENTS: No intracranial hemorrhage, extraaxial collection, or mass effect.  No CT evidence of acute infarct. Mild presumed chronic small vessel ischemic changes. Mild generalized volume loss. No hydrocephalus.     VISUALIZED ORBITS/SINUSES/MASTOIDS: No intraorbital abnormality. Small amount of fluid sphenoid sinuses. No middle ear or mastoid effusion.    BONES/SOFT TISSUES: Mild soft tissue swelling/hematoma overlying left zygomatic arch.      Impression    IMPRESSION:  1.  No acute intracranial process.      Collected Updated Procedure Result Status    10/18/2021 1511 10/18/2021 1607 Wound Aerobic Bacterial Culture Routine with Gram Stain [[id number]]   Wound from Leg, Below Knee, Right    In process Component Value   No component results              08/02/2021 1531 08/04/2021 1340 Wound Aerobic Bacterial Culture Routine [[id number]]    (Abnormal)   Wound from Leg, Below Knee, Right    Final result Component Value   Culture 4+ Pseudomonas aeruginosaAbnormal          Susceptibility     Pseudomonas aeruginosa     MARYBEL     Aztreonam >16 ug/mL Resistant     Cefepime 8 ug/mL Susceptible     Ceftazidime >16 ug/mL Resistant     Ciprofloxacin >2 ug/mL Resistant     Gentamicin 8 ug/mL Intermediate     Levofloxacin >4 ug/mL Resistant     Meropenem 4 ug/mL  Intermediate     Piperacillin/Tazobactam >64/4 ug/mL Resistant     Tobramycin <=2 ug/mL Susceptible                  07/21/2021 2238 07/21/2021 2332 Asymptomatic COVID-19 Virus (Coronavirus) by PCR Nasopharyngeal [[id number]]    Swab from Nasopharyngeal    Final result Component Value   No component results           07/21/2021 2238 07/21/2021 2332 SARS-COV2 (COVID-19) Virus RT-PCR [[id number]]    Swab from Nasopharyngeal    Final result Component Value   SARS CoV2 PCR Negative   NEGATIVE: SARS-CoV-2 (COVID-19) RNA not detected, presumed negative.           07/21/2021 1855 07/26/2021 2231 Blood Culture Peripheral Blood [[id number]]   Peripheral Blood    Final result Component Value   Culture No Growth              07/21/2021 1855 07/26/2021 2231 Blood Culture Peripheral Blood [[id number]]   Peripheral Blood    Final result Component Value   Culture No Growth

## 2022-01-10 NOTE — PATIENT INSTRUCTIONS
Ramon Johnson,    Thank you for taking the time for virtual visit today.  Please follow-up with wound clinic, and would recommend obtaining cultures during the wound care visit.  Follow-up with dermatology as scheduled  Follow-up with ID in 2 to 3 weeks.  Continuing empiric doxycycline  Follow-up with primary care for scalp laceration/wound care  Please call with questions

## 2022-01-10 NOTE — ED PROVIDER NOTES
EMERGENCY DEPARTMENT ENCOUNTER      NAME: Elizabeth Kelley  AGE: 76 year old female  YOB: 1945  MRN: [id number]  EVALUATION DATE & TIME: 1/9/2022  6:51 PM    PCP: Francisco Ramierz    ED PROVIDER: Ramo Fuentes M.D.    Chief Complaint   Patient presents with     Fall       FINAL IMPRESSION:  1. Laceration of forehead, initial encounter    2. Skin tear of left hand without complication, initial encounter      ED COURSE & MEDICAL DECISION MAKING:    Pertinent Labs & Imaging studies personally reviewed and interpreted by me. (See chart for details)    6:59 PM Patient seen and examined, prior records reviewed.  I met the patient and performed my initial interview and exam.  Differential diagnosis includes but not limited to intracranial hemorrhage, skull fracture, contusion, scalp laceration, concussion, facial fracture, nasal fracture, mandible fracture, dental fracture.  Patient presents with a trip and fall, significant left frontal scalp laceration down to the skull.  Extensive cleaning.  Head CT will be ordered.  7:07 PM I performed a laceration repair.  Head CT is pending.    At the conclusion of the encounter I discussed the results of all of the tests and the disposition. The questions were answered. The patient or family acknowledged understanding and was agreeable with the care plan.     PROCEDURES:   [facility]    -Laceration Repair    Date/Time: 1/9/2022 7:05 PM  Performed by: Ramo Fuentes MD  Authorized by: Ramo Fuentes MD     Risks, benefits and alternatives discussed.      ANESTHESIA (see MAR for exact dosages):     Anesthesia method:  Local infiltration    Local anesthetic:  Lidocaine 1% WITH epi  LACERATION DETAILS     Location:  Scalp (Left forehead)    Scalp location:  Frontal    Length (cm):  6    Laceration depth: full thickness.    REPAIR TYPE:     Repair type:  Complex      EXPLORATION:     Limited defect created (wound extended): no       Hemostasis achieved with:  Epinephrine    Wound exploration: wound explored through full range of motion and entire depth of wound probed and visualized      Wound extent: foreign bodies/material      Foreign bodies/material:  Paint chips    TREATMENT:     Area cleansed with:  Saline    Amount of cleaning:  Extensive    Irrigation solution:  Sterile saline    Irrigation method:  Syringe    Visualized foreign bodies/material removed: yes      Debridement:  None    Undermining:  None    Scar revision: no      SUBCUTANEOUS REPAIR     Suture size:  5-0    Suture material:  Vicryl    Suture technique:  Simple interrupted    Number of sutures:  3    SKIN REPAIR     Repair method:  Sutures    Suture size:  5-0    Suture material:  Nylon    Suture technique:  Running    Number of sutures: One running plus one simple interrupted in area of gap.    APPROXIMATION     Approximation:  Close    POST-PROCEDURE DETAILS     Dressing:  Antibiotic ointment        PROCEDURE    Patient Tolerance:  Patient tolerated the procedure well with no immediate complications      MEDICATIONS GIVEN IN THE EMERGENCY:  Medications - No data to display    NEW PRESCRIPTIONS STARTED AT TODAY'S ER VISIT  New Prescriptions    No medications on file       =================================================================    HPI    Patient information was obtained from: Patient      Elizabeth Kelley is a 76 year old female with a pertinent history of hypertension, obesity, MS, and gait abnormality who presents to this ED by EMS for evaluation of fall.    Patient reports that she fell forward and hit the corner of the wall hitting her head causing a laceration and cut her left thumb. Does not use a walker or cane. Denies syncope. Patients has MS but not on medication for it. Denies diabetes or high blood pressure medication. Notes she has a previous wound on her right leg and has grown 50% of her skin back to the wound. Denies any other complaints at this  time.       REVIEW OF SYSTEMS   Review of Systems   Skin: Positive for wound.   Neurological: Negative for syncope.      All other systems reviewed and negative    PAST MEDICAL HISTORY:  Past Medical History:   Diagnosis Date     Ankle contracture, left      Class 3 severe obesity due to excess calories without serious comorbidity with body mass index (BMI) of 45.0 to 49.9 in adult (H)      Combined gastric and duodenal ulcer      Depression      Dyslipidemia      Edema      Gait abnormality      GERD (gastroesophageal reflux disease)      Lymphedema of both lower extremities      Melanoma (H)     left upper arm     MS (multiple sclerosis) (H)      RLS (restless legs syndrome)      Skin ulcer of left lower leg (H)      Valgus deformity of both feet      Vitamin D deficiency        PAST SURGICAL HISTORY:  Past Surgical History:   Procedure Laterality Date     ABLATION SAPHENOUS VEIN W/ RFA Right 04/12/2021    Saphenous vein, anterior accessory saphenous vein, sclerotherapy of multiple veins.     COSMETIC SURGERY  1988    reduction of excess skin from weight loss     ESOPHAGOSCOPY, GASTROSCOPY, DUODENOSCOPY (EGD), COMBINED N/A 03/30/2015    Procedure: UPPER ENDOSCOPY with gastric biopsy;  Surgeon: Checo Fletcher MD;  Location: [facility];  Service:      MAMMOPLASTY REDUCTION Bilateral      MOHS MICROGRAPHIC PROCEDURE      left upper arm, melanoma     PICC SINGLE LUMEN PLACEMENT  8/13/2021          PICC SINGLE LUMEN PLACEMENT  9/2/2021          SPINE SURGERY      L5 area surgery, decompression       CURRENT MEDICATIONS:    No current facility-administered medications for this encounter.     Current Outpatient Medications   Medication     aspirin 81 mg chewable tablet     buPROPion (WELLBUTRIN SR) 150 MG 12 hr tablet     cholecalciferol, vitamin D3, 1,000 unit tablet     citalopram (CELEXA) 40 MG tablet     doxycycline monohydrate (MONODOX) 100 MG capsule     furosemide (LASIX) 20 MG tablet      "HYDROcodone-acetaminophen (NORCO) 5-325 MG tablet     multivitamin therapeutic (THERAGRAN) tablet     multivitamin w/minerals (CENTRUM ADULTS) tablet     naproxen sodium (ALEVE) 220 MG tablet     pantoprazole (PROTONIX) 40 MG tablet     potassium chloride ER (KLOR-CON M) 20 MEQ CR tablet     pregabalin (LYRICA) 75 MG capsule     rOPINIRole (REQUIP) 1 MG tablet     simvastatin (ZOCOR) 40 MG tablet     Skin Protectants, Misc. (INTERDRY 10\"X36\") SHEE     spironolactone (ALDACTONE) 25 MG tablet       ALLERGIES:  Allergies   Allergen Reactions     Other Environmental Allergy Anaphylaxis     Bees/Wasps Stings     Adhesive Tape Unknown     Adhesive [Mecrylate] Unknown     Other reaction(s): sores     Vicodin [Hydrocodone-Acetaminophen] Nausea and Vomiting and Hives       FAMILY HISTORY:  Family History   Problem Relation Age of Onset     Uterine Cancer Mother      Hyperlipidemia Mother      Hypertension Mother      Multiple Sclerosis Mother      Asthma Sister      Obesity Sister      Hyperlipidemia Sister      Hypertension Sister      Multiple Sclerosis Sister      Arthritis Sister      Colon Cancer Paternal Aunt      Breast Cancer Paternal Aunt      Hypertension Paternal Aunt      Hyperlipidemia Paternal Aunt      Brain Cancer Father      Arthritis Maternal Uncle      Arthritis Maternal Grandmother      Early Death Maternal Grandfather      Arthritis Paternal Grandmother      Arthritis Paternal Grandfather        SOCIAL HISTORY:   Social History     Socioeconomic History     Marital status: Single     Spouse name: Not on file     Number of children: Not on file     Years of education: Not on file     Highest education level: Not on file   Occupational History     Not on file   Tobacco Use     Smoking status: Former Smoker     Packs/day: 0.25     Years: 12.00     Pack years: 3.00     Types: Cigarettes     Quit date: 1975     Years since quittin.0     Smokeless tobacco: Never Used     Tobacco comment: quit more " "than 30 years ago   Substance and Sexual Activity     Alcohol use: Yes     Alcohol/week: 1.0 standard drink     Drug use: No     Sexual activity: Yes     Partners: Male     Birth control/protection: Post-menopausal   Other Topics Concern     Not on file   Social History Narrative    .   Has significant other.  2 grown children.  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       Social Determinants of Health     Financial Resource Strain: Not on file   Food Insecurity: Not on file   Transportation Needs: Not on file   Physical Activity: Not on file   Stress: Not on file   Social Connections: Not on file   Intimate Partner Violence: Not on file   Housing Stability: Not on file       VITALS:  BP (!) 174/67   Pulse 72   Temp 97.8  F (36.6  C) (Oral)   Resp 20   Ht 1.549 m (5' 1\")   Wt 114.3 kg (252 lb)   SpO2 97%   BMI 47.61 kg/m      PHYSICAL EXAM:  Physical Exam  Vitals and nursing note reviewed.   Constitutional:       Appearance: Normal appearance.   HENT:      Head: Normocephalic and atraumatic.      Right Ear: External ear normal.      Left Ear: External ear normal.      Nose: Nose normal.   Eyes:      Extraocular Movements: Extraocular movements intact.      Conjunctiva/sclera: Conjunctivae normal.   Pulmonary:      Effort: Pulmonary effort is normal.   Musculoskeletal:         General: Normal range of motion.      Cervical back: Normal range of motion.      Right lower leg: No edema.      Left lower leg: No edema.   Skin:     General: Skin is warm and dry.      Comments: 6 cm full thickness laceration of left forehead. 2 cm skin tear to base of right thumb.    Neurological:      General: No focal deficit present.      Mental Status: She is alert and oriented to person, place, and time. Mental status is at baseline.   Psychiatric:         Mood and Affect: Mood normal.         Behavior: Behavior normal.         Thought Content: Thought content normal.       LAB:  All pertinent labs reviewed and " interpreted.  Results for orders placed or performed during the hospital encounter of 01/09/22   Head CT w/o contrast    Impression    IMPRESSION:  1.  No acute intracranial process.     RADIOLOGY:  Reviewed all pertinent imaging. Please see official radiology report.  Head CT w/o contrast   Final Result   IMPRESSION:   1.  No acute intracranial process.        I, Junior Orourke, am serving as a scribe to document services personally performed by Dr. Fuentes based on my observation and the provider's statements to me. I, Ramo Fuentes MD attest that Junior Haven is acting in a scribe capacity, has observed my performance of the services and has documented them in accordance with my direction.    Ramo Fuentes M.D.  Emergency Medicine  [facility] EMERGENCY DEPARTMENT  [address] [place] [id number]  [phone]  Dept: [phone]     Ramo Fuentes MD  01/09/22 [id number]

## 2022-01-10 NOTE — ED TRIAGE NOTES
Patient has hx MS, increasing weakness- pt lost balance, fell.  Pt hit corner of wall, has laceration to left side of head and left hand.  Pt denies LOC, no blood thinners.

## 2022-01-10 NOTE — LETTER
[facility]  [address]  [place] [address]  Phone: [phone]  Fax: [phone]      January 18, 2022      Elizabeth Kelley  [address]  [place] [address]          To Whom It May Concern,    Patient Elizabeth Kelley has a severe venous stasis ulcer to her right leg which requires periodic sharp debridement in the clinic. This is done to remove devitalized and nonviable tissue, along with any fibrin and slough, improve granulation tissue formation, stimulate wound healing, decrease overall bacteria load, disrupt biofilm formation and decrease edge senescence. Without periodic debridement this could delay wound healing; increase risk of infection; increase risk of hospitalization; increase risk of limb loss.     Sincerely,     Staci Junior NP      INTERFACED REPORT

## 2022-01-16 ENCOUNTER — TRANSFERRED RECORDS (OUTPATIENT)
Dept: HEALTH INFORMATION MANAGEMENT | Facility: CLINIC | Age: 77
End: 2022-01-16
Payer: COMMERCIAL

## 2022-01-18 ENCOUNTER — TELEPHONE (OUTPATIENT)
Dept: VASCULAR SURGERY | Facility: CLINIC | Age: 77
End: 2022-01-18
Payer: COMMERCIAL

## 2022-01-18 ENCOUNTER — TRANSFERRED RECORDS (OUTPATIENT)
Dept: HEALTH INFORMATION MANAGEMENT | Facility: CLINIC | Age: 77
End: 2022-01-18
Payer: COMMERCIAL

## 2022-01-18 NOTE — TELEPHONE ENCOUNTER
Spoke with patient and Staci Junior DNP. Will cancel tomorrows apt with Staci Junior DNP. Will reschedule once receive biopsy results from Peg Barth. Advised to get records sent to us. Derm is concerned patient may have Pyoderma Gangrenosum.

## 2022-01-18 NOTE — PROGRESS NOTES
Spoke with Mel with the billing dept  to discuss a denied claim that needs a retro authorization. DOS 12/1/21 for debridement with Staci Junior CNP.   OK to LM: [phone]     Mel will do the appeal after Staci Junior DNP does a letter of Medical necessity.

## 2022-01-18 NOTE — TELEPHONE ENCOUNTER
----- Message from Christina Sun sent at 1/18/2022  8:39 AM CST -----  Mel with the billing dept would like a call back to discuss a denied claim that needs a retro authorization. DOS 12/1/21 for debridement jessie/ DILMA.   OK to : [phone]

## 2022-01-18 NOTE — TELEPHONE ENCOUNTER
Caller: Elizabeth    Provider: JASPAL Junior    Detailed reason for call: She had a biopsy performed at dermatologist this morning. He does not want her wound debrided until healed from biopsy. Should she still come for her follow up tomorrow or postpone this visit?    Best phone number to contact: [phone]    Best time to contact: today    Ok to leave a detailed message: Yes

## 2022-01-24 ENCOUNTER — TELEPHONE (OUTPATIENT)
Dept: INFECTIOUS DISEASES | Facility: CLINIC | Age: 77
End: 2022-01-24

## 2022-01-24 ENCOUNTER — VIRTUAL VISIT (OUTPATIENT)
Dept: INFECTIOUS DISEASES | Facility: CLINIC | Age: 77
End: 2022-01-24
Payer: COMMERCIAL

## 2022-01-24 DIAGNOSIS — L03.115 CELLULITIS OF RIGHT LOWER EXTREMITY: Primary | ICD-10-CM

## 2022-01-24 PROCEDURE — 99212 OFFICE O/P EST SF 10 MIN: CPT | Mod: GT | Performed by: INTERNAL MEDICINE

## 2022-01-24 NOTE — PROGRESS NOTES
Elizabeth is a 76 year old who is being evaluated via a billable video visit.      How would you like to obtain your AVS? MyChart  If the video visit is dropped, the invitation should be resent by: Text to cell phone: [phone]   Will anyone else be joining your video visit? No    Video Start Time: 2:20 pm  Video-Visit Details    Type of service:  Video Visit    Video End Time:2:28 pm    Originating Location (pt. Location): Home    Distant Location (provider location):  [place], [place]    Platform used for Video Visit: [employer]    Previous photo:                  INFECTIOUS DISEASE [facility] FOLLOW UP NOTE- VIRTUAL VISIT    Date: 01/24/2022   Patient Name: Elizabeth Kelley   YOB: 1945  MRN: [id number]        ASSESSMENT:    Fall on 1/9/2022, laceration of scalp, improving.  ED notes reviewed, sutured.  Denies headache, loss of consciousness.    RLE cellulitis with weeping ulcers, pseudomonas in wound.,  MRSA infection, on IV vancomycin since 9/30/2021, and on iv meropenem since ~8/6 with some improvement.  Has completed IV antibiotic therapy  Pseudomonas is multidrug resistant  Improving on doxycycline    Wbc and CRP stable.  Previously    Concern about noninfectious underlying etiology such as pyoderma.  Dermatology referral requested and pending.    Redness at PICC site on R.  Switch to left side, with hypoallergenic dressing    Open wounds have significantly improved and slough has decreased  Patient has completed antibiotic therapy    Photos currently 1/10/2022 above    Previous photos 12/13/2021.      Photo 11/8/2021                    Photos on 10/18/2021:            Inguinal candidiasis- resolved  Dermatology, pyoderma gangrenosum versus stasis dermatitis      PLAN:    Improved, seen dermatology  Completed empiric doxycycline  Has follow-up wound care appointment.  Lymphedema management  Follow-up biopsy result  Please see if Primary can help with the pain (appears to be related to  nerve/wound healing). Dermatology appointment would be very helpful for additional treatment options.   Lymphedema management    Follow up in 2 to 3 weeks.    Kathe Vargas MD  [facility]  [phone]              ______________________________________________________________________    SUBJECTIVE / INTERVAL HISTORY:     Doing better, wound improving, localized slough  Overall edema better  Seen by dermatology and biopsy done, awaiting results  Concerned about pyoderma gangrenosum versus stasis dermatitis      Previous: Virtual visit, had a fall on 1/9/2022.  Appears mechanical according to the patient  Laceration, went to ED.  Notes reviewed  Leg wound improving, patient tolerating doxycycline    Previous note: Elizabeth Kelley returns for follow up of RLE cellulitis.  Slough improved, painful.  Wounds improving, healed      Pain is decreased, 2 areas with pain, and worse with being on feet    Awaiting dermatology consultation, scheduled    Previous note:    Pain improved, wounds are less deep in certain areas.  Continues to have some slough  Wound care following  Tolerating vancomycin and meropenem    9/30:    Continues on meropenem. Tolerating this and infusions. No rash or diarrhea. Difficulty with tape, but stable. Notices more pain, especially in am. Pain awakens her. Takes tylenol 1gm in am and again right before bed. Not on narcotic pain meds. Rarely takes alleve. Pain at other times is tolerable.  Sees wound care every 2 weeks and dresses her own wounds bid with nonadherent dressing.    ROS: All other systems negative except as listed above.      Current Outpatient Medications:      aspirin 81 mg chewable tablet, Take 81 mg by mouth every evening , Disp: , Rfl:      buPROPion (WELLBUTRIN SR) 150 MG 12 hr tablet, Take 150 mg by mouth every morning , Disp: , Rfl:      cholecalciferol, vitamin D3, 1,000 unit tablet, [CHOLECALCIFEROL, VITAMIN D3, 1,000 UNIT TABLET] Take 2,000 Units  "by mouth daily., Disp: , Rfl:      citalopram (CELEXA) 40 MG tablet, Take 40 mg by mouth every morning , Disp: , Rfl:      furosemide (LASIX) 20 MG tablet, Take 20 mg by mouth 2 times daily AM and afternoon (4PM), Disp: , Rfl:      HYDROcodone-acetaminophen (NORCO) 5-325 MG tablet, TAKE 1 TABLET BY MOUTH EVERY 8 HOURS AS NEEDED FOR 4 DAYS, Disp: , Rfl:      multivitamin therapeutic (THERAGRAN) tablet, [MULTIVITAMIN THERAPEUTIC (THERAGRAN) TABLET] Take 1 tablet by mouth daily., Disp: , Rfl:      multivitamin w/minerals (CENTRUM ADULTS) tablet, as directed, Disp: , Rfl:      naproxen sodium (ALEVE) 220 MG tablet, Take 440 mg by mouth daily as needed , Disp: , Rfl:      pantoprazole (PROTONIX) 40 MG tablet, [PANTOPRAZOLE (PROTONIX) 40 MG TABLET] Take 40 mg by mouth daily., Disp: , Rfl:      potassium chloride ER (KLOR-CON M) 20 MEQ CR tablet, Take 20 mEq by mouth every evening, Disp: , Rfl:      pregabalin (LYRICA) 75 MG capsule, 1 capsule, Disp: , Rfl:      rOPINIRole (REQUIP) 1 MG tablet, Take 1 mg by mouth At Bedtime, Disp: , Rfl:      simvastatin (ZOCOR) 40 MG tablet, [SIMVASTATIN (ZOCOR) 40 MG TABLET] Take 40 mg by mouth bedtime., Disp: , Rfl:      Skin Protectants, Misc. (INTERDRY 10\"X36\") SHEE, Externally apply 7 Units topically once as needed (change daily in groin rash/fold), Disp: 7 each, Rfl: 3     spironolactone (ALDACTONE) 25 MG tablet, [SPIRONOLACTONE (ALDACTONE) 25 MG TABLET] Take 25 mg by mouth daily., Disp: , Rfl:       OBJECTIVE:  There were no vitals taken for this visit.    GEN: No acute distress.    RESPIRATORY:  Normal breathing pattern.   EXTREMITIES: chronic lymphedema  Skin: Ulceration with slough improving  Previous note:  SKIN/HAIR/NAILS: Inguinal candidiasis previous note  See above for photo from 12/13/2021   Right lower extremity:  Slough has significantly reduced. No undermining, no fluctuance or tunneling. Open ulcerations are tender  11/8/2021                  Picture from previous " visit:       New Pictures from 9/30/21:  Anterior view, right leg      Medial view, right leg      Lateral view, right leg      Photos from 10/18/2021:                  Pertinent labs:    CRP   Date Value Ref Range Status   10/11/2021 2.8 (H) 0.0-<0.8 mg/dL Final     C-Reactive Protein High Sensitivity   Date Value Ref Range Status   10/20/2021 31.7 (H) 0.0 - 3.0 mg/L Final     Comment:     Low risk < 1.0 mg/L  Average risk 1.0 to 3.0 mg/L  High risk > 3.0 mg/L  Acute inflamation > 10.0 mg/L     Last Comprehensive Metabolic Panel:  Sodium   Date Value Ref Range Status   11/10/2021 142 136 - 145 mmol/L Final     Potassium   Date Value Ref Range Status   11/10/2021 4.4 3.5 - 5.0 mmol/L Final     Chloride   Date Value Ref Range Status   11/10/2021 104 98 - 107 mmol/L Final     Carbon Dioxide (CO2)   Date Value Ref Range Status   11/10/2021 24 22 - 31 mmol/L Final     Anion Gap   Date Value Ref Range Status   11/10/2021 14 5 - 18 mmol/L Final     Glucose   Date Value Ref Range Status   11/10/2021 107 70 - 125 mg/dL Final     Urea Nitrogen   Date Value Ref Range Status   11/10/2021 20 8 - 28 mg/dL Final     Creatinine   Date Value Ref Range Status   11/10/2021 0.86 0.60 - 1.10 mg/dL Final     GFR Estimate   Date Value Ref Range Status   11/10/2021 67 >60 mL/min/1.73m2 Final     Comment:     As of July 11, 2021, eGFR is calculated by the CKD-EPI creatinine equation, without race adjustment. eGFR can be influenced by muscle mass, exercise, and diet. The reported eGFR is an estimation only and is only applicable if the renal function is stable.   09/26/2020 >60 >60 mL/min/1.73m2 Final     Calcium   Date Value Ref Range Status   11/10/2021 9.5 8.5 - 10.5 mg/dL Final     CBC RESULTS:   Recent Labs   Lab Test 09/07/21  1000   WBC 9.4   RBC 4.12   HGB 10.9*   HCT 35.0   MCV 85   MCH 26.5   MCHC 31.1*   RDW 14.6          MICROBIOLOGY DATA:    T Cell Subset:  WBC Count   Date Value Ref Range Status   10/20/2021 9.7 4.0 -  11.0 10e3/uL Final     % Lymphocytes   Date Value Ref Range Status   10/20/2021 11 % Final         RADIOLOGY:    Recent Results (from the past 744 hour(s))   Head CT w/o contrast    Narrative    EXAM: CT HEAD W/O CONTRAST  LOCATION: [facility]  DATE/TIME: 1/9/2022 8:09 PM    INDICATION: Head injury  COMPARISON: None.  TECHNIQUE: Routine CT Head without IV contrast. Multiplanar reformats. Dose reduction techniques were used.    FINDINGS:  INTRACRANIAL CONTENTS: No intracranial hemorrhage, extraaxial collection, or mass effect.  No CT evidence of acute infarct. Mild presumed chronic small vessel ischemic changes. Mild generalized volume loss. No hydrocephalus.     VISUALIZED ORBITS/SINUSES/MASTOIDS: No intraorbital abnormality. Small amount of fluid sphenoid sinuses. No middle ear or mastoid effusion.    BONES/SOFT TISSUES: Mild soft tissue swelling/hematoma overlying left zygomatic arch.      Impression    IMPRESSION:  1.  No acute intracranial process.      Collected Updated Procedure Result Status    10/18/2021 1511 10/18/2021 1607 Wound Aerobic Bacterial Culture Routine with Gram Stain [[id number]]   Wound from Leg, Below Knee, Right    In process Component Value   No component results              08/02/2021 1531 08/04/2021 1340 Wound Aerobic Bacterial Culture Routine [[id number]]    (Abnormal)   Wound from Leg, Below Knee, Right    Final result Component Value   Culture 4+ Pseudomonas aeruginosaAbnormal          Susceptibility     Pseudomonas aeruginosa     MARYBEL     Aztreonam >16 ug/mL Resistant     Cefepime 8 ug/mL Susceptible     Ceftazidime >16 ug/mL Resistant     Ciprofloxacin >2 ug/mL Resistant     Gentamicin 8 ug/mL Intermediate     Levofloxacin >4 ug/mL Resistant     Meropenem 4 ug/mL Intermediate     Piperacillin/Tazobactam >64/4 ug/mL Resistant     Tobramycin <=2 ug/mL Susceptible                  07/21/2021 2238 07/21/2021 2332 Asymptomatic COVID-19 Virus (Coronavirus) by  PCR Nasopharyngeal [[id number]]    Swab from Nasopharyngeal    Final result Component Value   No component results           07/21/2021 2238 07/21/2021 2332 SARS-COV2 (COVID-19) Virus RT-PCR [[id number]]    Swab from Nasopharyngeal    Final result Component Value   SARS CoV2 PCR Negative   NEGATIVE: SARS-CoV-2 (COVID-19) RNA not detected, presumed negative.           07/21/2021 1855 07/26/2021 2231 Blood Culture Peripheral Blood [[id number]]   Peripheral Blood    Final result Component Value   Culture No Growth              07/21/2021 1855 07/26/2021 2231 Blood Culture Peripheral Blood [[id number]]   Peripheral Blood    Final result Component Value   Culture No Growth

## 2022-01-24 NOTE — PATIENT INSTRUCTIONS
Ramon Johnson  Thank you for taking time for the virtual visit today.  We are waiting for the final biopsy results and input from dermatology.  Management will include edema management, wound care, and antibiotics if needed.  If pyoderma gangrenosum is confirmed, treatment would require anti-inflammatory medications.  Follow-up with dermatology.  Follow-up with wound care  Follow-up with infectious disease in 2 to 3 weeks, earlier if needed.

## 2022-01-31 ENCOUNTER — TELEPHONE (OUTPATIENT)
Dept: INFECTIOUS DISEASES | Facility: CLINIC | Age: 77
End: 2022-01-31
Payer: COMMERCIAL

## 2022-02-02 ENCOUNTER — OFFICE VISIT (OUTPATIENT)
Dept: VASCULAR SURGERY | Facility: CLINIC | Age: 77
End: 2022-02-02
Attending: NURSE PRACTITIONER
Payer: COMMERCIAL

## 2022-02-02 VITALS
RESPIRATION RATE: 18 BRPM | DIASTOLIC BLOOD PRESSURE: 62 MMHG | HEART RATE: 72 BPM | SYSTOLIC BLOOD PRESSURE: 140 MMHG | TEMPERATURE: 98.2 F | BODY MASS INDEX: 49.2 KG/M2 | WEIGHT: 260.4 LBS

## 2022-02-02 DIAGNOSIS — I87.2 VENOUS INSUFFICIENCY OF BOTH LOWER EXTREMITIES: ICD-10-CM

## 2022-02-02 DIAGNOSIS — I89.0 ACQUIRED LYMPHEDEMA OF LEG: ICD-10-CM

## 2022-02-02 DIAGNOSIS — E66.01 MORBID OBESITY (H): ICD-10-CM

## 2022-02-02 DIAGNOSIS — L97.912 CHRONIC ULCER OF RIGHT LEG, WITH FAT LAYER EXPOSED (H): Primary | ICD-10-CM

## 2022-02-02 DIAGNOSIS — L90.5 SCAR CONDITION AND FIBROSIS OF SKIN: ICD-10-CM

## 2022-02-02 DIAGNOSIS — L08.9 RECURRENT INFECTION OF SKIN: ICD-10-CM

## 2022-02-02 DIAGNOSIS — I87.303 VENOUS HYPERTENSION OF LOWER EXTREMITY, BILATERAL: ICD-10-CM

## 2022-02-02 DIAGNOSIS — E66.01 MORBID OBESITY WITH BMI OF 50.0-59.9, ADULT (H): ICD-10-CM

## 2022-02-02 PROCEDURE — 11042 DBRDMT SUBQ TIS 1ST 20SQCM/<: CPT

## 2022-02-02 PROCEDURE — 87220 TISSUE EXAM FOR FUNGI: CPT | Performed by: NURSE PRACTITIONER

## 2022-02-02 PROCEDURE — 11045 DBRDMT SUBQ TISS EACH ADDL: CPT

## 2022-02-02 RX ORDER — METRONIDAZOLE 7.5 MG/G
GEL TOPICAL 2 TIMES DAILY
Qty: 45 G | Refills: 3 | Status: SHIPPED | OUTPATIENT
Start: 2022-02-02 | End: 2022-03-18

## 2022-02-02 RX ORDER — DOXYCYCLINE 100 MG/1
100 TABLET ORAL 2 TIMES DAILY
Qty: 56 TABLET | Refills: 0 | OUTPATIENT
Start: 2022-02-02 | End: 2022-03-02

## 2022-02-02 RX ORDER — DOXYCYCLINE 100 MG/1
100 TABLET ORAL 2 TIMES DAILY
COMMUNITY
End: 2022-02-28

## 2022-02-02 ASSESSMENT — PAIN SCALES - GENERAL: PAINLEVEL: SEVERE PAIN (6)

## 2022-02-02 NOTE — PATIENT INSTRUCTIONS
"ANSHUL was normal adequate blood flow in the legs and feet for healing    Venous insufficiency study was normal valves are working in the veins      I took a skin sample today to check for yeast we will call you with results usually takes 1-2 days    I sent a prescription to your pharmacy for Metrogel this should help with the odor    We will get you scheduled with Dr. Bullock at [facility] wound clinic to consider surgical debridement      Wound Care Instructions    TWICE PER DAY and as needed, Cleanse your right leg wound(s) with Dilute hibiclens 30cc in 500cc NS. OK to rinse with saline after     Pat Dry with non-sterile gauze    Apply Vaseline or zinc oxide or Triad paste to the skin surrounding the wounds to protect from drainage    Primary Dressing: Apply sorbact into/onto the wounds    Secondary dressing: Cover with ABD or baby diapers    Secure with non-sterile roll gauze (4\" x 75\" roll) and tape (1\" roll tape) as needed; avoid adhesive directly on the skin    Compression: tubular compression and short stretch    Continue using the lymphedema pump on the left leg and abdomen    Elevate the legs throughout the day    It is not ok to get your wound wet in the bath or shower    SEEK MEDICAL CARE IF:    You have an increase in swelling, pain, or redness around the wound.    You have an increase in the amount of pus coming from the wound.    There is a bad smell coming from the wound.    The wound appears to be worsening/enlarging    You have a fever greater than 101.5 F      It is ok to continue current wound care treatment/products for the next 2-3 days until new wound care supplies are ordered and arrive. If longer than this please contact our office at [phone].    "

## 2022-02-02 NOTE — LETTER
"   [facility]  [address] [place] [address] [address]  [place], [place] [address]  [phone]      Fax [phone]    2022    [facility]  Fax: [phone] Wound Dressing Rx and Order Form  Customer Service: [phone] Order Status: New   Verbal: Staci Junior CNP/Светлана ZARCO  Patient Info:  Name: Elizabeth Kelley  : 1945  Address: [address] [place] [id number]  [phone] (home)           Insurance Info:  INSURER: Payor: [employer] / Plan: [employer] / Product Type: HMO /   Policy ID#:  [id number]  SECONDARY INSURANCE:  BCBS  Secondary Policy ID#:  [id number]    Physician Info:  Staci Junior CNP   Dept Address/Phones:   [address] [place] [address], [address]  [facility] [address] [phone]  Fax: [phone]    Diagnosis: See below    Impression:   Encounter Diagnoses   Name Primary?     Chronic ulcer of right leg, with fat layer exposed (H) Yes     Acquired lymphedema of leg      Recurrent infection of skin      Scar condition and fibrosis of skin      Morbid obesity (H)      Venous insufficiency of both lower extremities      Venous hypertension of lower extremity, bilateral      Morbid obesity with BMI of 50.0-59.9, adult (H)        Lymphedema circumferential measurements (in cm):              Wound info:  L 17 cm  W 35 cm  Depth 0.1 cm  Total square centimeters: 595 cm         Drainage: Mod  Thickness:  Full  Duration of Need: 30  Days Supply: 30  Start Date: 2022  Starter Kit:NA  Qualifying wound/Debridement: Yes     Dressing Type Brand Size Number of pieces Frequency of change   Primary Cutimed Sorbact WCL  8 x 10\" 60 Twice per day                                           Secondary                                        Tape            No substitutions preferred. Call [phone].     Note: If total out of pocket is more than $50.00 please contact the patient before processing order.     OK to forward to " covered supplier.    Electronically Signed Physician: LEANDRO NOVOA                         Date: 2/21/2022

## 2022-02-02 NOTE — PROGRESS NOTES
Follow up Vascular Visit       Date of Service:02/02/22      Chief Complaint: right leg venous stasis ulcer; severe; chronic; BLE edema; chronic      Pt returns to [facility] with regards to their right leg venous stasis ulcer; severe; chronic; BLE edema chronic.  They arrive today alone. She is very tearful today; saying she is having a tough day; she is lonely her partner is out of town and so are her daughters. She is concerned about the odor from the leg wound and would like this addressed. They are currently using dilute hibiclens; sorbact ABD; rolled gauze to the wounds. This is being done by the patient twice per day. She was seen by ID; there was concern for underlying PG; she was referred to dermatology they took several biopsies and this came back as venous stasis; dermatology has signed off. They are using tubular compression with short stretch for compression. They are feeling well today. Denies fevers, chills. No shortness of breath. She is trying to use her lymphedema pump.  Pertinent medical history includes multiple sclerosis, obesity, acquired lymphedema following lymphectomies, and venous insufficiency s/p Right Lower Extremity interventions. She has a history of recurrent lower limb cellulitis and  Left Lower Extremity ulcers which have healed. She also has an altered gait secondary to bilateral valgus deformity, left ankle contracture and bilateral flat feel in conjunction with multiple sclerosis. She is also following with ID; has been treated with multiple antibiotic for recent severe cellulitis infection in July; they have been off all antibiotics for 8 weeks now. We obtained ANSHUL this was normal; we obtained venous insufficiency and this was normal. I had contacted her pcp to consider adding gabapentin or lyrica to her regimen as the hydrocodone was ineffective this did occur taking gabapentin with good relief; taking minimal hydrocodone now.     Allergies:    Allergies   Allergen Reactions     Other Environmental Allergy Anaphylaxis     Bees/Wasps Stings     Adhesive Tape Unknown     Adhesive [Mecrylate] Unknown     Other reaction(s): sores     Vicodin [Hydrocodone-Acetaminophen] Nausea and Vomiting and Hives       Medications:   Current Outpatient Medications:      aspirin 81 mg chewable tablet, Take 81 mg by mouth every evening , Disp: , Rfl:      buPROPion (WELLBUTRIN SR) 150 MG 12 hr tablet, Take 150 mg by mouth every morning , Disp: , Rfl:      cholecalciferol, vitamin D3, 1,000 unit tablet, [CHOLECALCIFEROL, VITAMIN D3, 1,000 UNIT TABLET] Take 2,000 Units by mouth daily., Disp: , Rfl:      citalopram (CELEXA) 40 MG tablet, Take 40 mg by mouth every morning , Disp: , Rfl:      doxycycline monohydrate (ADOXA) 100 MG tablet, Take 100 mg by mouth 2 times daily For 28 days, Disp: , Rfl:      furosemide (LASIX) 20 MG tablet, Take 20 mg by mouth 2 times daily AM and afternoon (4PM), Disp: , Rfl:      HYDROcodone-acetaminophen (NORCO) 5-325 MG tablet, TAKE 1 TABLET BY MOUTH EVERY 8 HOURS AS NEEDED FOR 4 DAYS, Disp: , Rfl:      metroNIDAZOLE (METROGEL) 0.75 % external gel, Apply topically 2 times daily Apply 3 grams BID to the leg wound, Disp: 45 g, Rfl: 3     multivitamin therapeutic (THERAGRAN) tablet, [MULTIVITAMIN THERAPEUTIC (THERAGRAN) TABLET] Take 1 tablet by mouth daily., Disp: , Rfl:      multivitamin w/minerals (CENTRUM ADULTS) tablet, as directed, Disp: , Rfl:      naproxen sodium (ALEVE) 220 MG tablet, Take 440 mg by mouth daily as needed , Disp: , Rfl:      pantoprazole (PROTONIX) 40 MG tablet, [PANTOPRAZOLE (PROTONIX) 40 MG TABLET] Take 40 mg by mouth daily., Disp: , Rfl:      potassium chloride ER (KLOR-CON M) 20 MEQ CR tablet, Take 20 mEq by mouth every evening, Disp: , Rfl:      pregabalin (LYRICA) 75 MG capsule, 1 capsule, Disp: , Rfl:      rOPINIRole (REQUIP) 1 MG tablet, Take 1 mg by mouth At Bedtime, Disp: , Rfl:      simvastatin (ZOCOR) 40 MG tablet,  "[SIMVASTATIN (ZOCOR) 40 MG TABLET] Take 40 mg by mouth bedtime., Disp: , Rfl:      Skin Protectants, Misc. (INTERDRY 10\"X36\") SHEE, [place]a apply 7 Units topically once as needed (change daily in groin rash/fold), Disp: 7 each, Rfl: 3     spironolactone (ALDACTONE) 25 MG tablet, [SPIRONOLACTONE (ALDACTONE) 25 MG TABLET] Take 25 mg by mouth daily., Disp: , Rfl:     History:   Past Medical History:   Diagnosis Date     Ankle contracture, left      Class 3 severe obesity due to excess calories without serious comorbidity with body mass index (BMI) of 45.0 to 49.9 in adult (H)      Combined gastric and duodenal ulcer      Depression      Dyslipidemia      Edema      Gait abnormality      GERD (gastroesophageal reflux disease)      Lymphedema of both lower extremities      Melanoma (H)     left upper arm     MS (multiple sclerosis) (H)      RLS (restless legs syndrome)      Skin ulcer of left lower leg (H)      Valgus deformity of both feet      Vitamin D deficiency        Physical Exam:    BP (!) 140/62   Pulse 72   Temp 98.2  F (36.8  C)   Resp 18   Wt 260 lb 6.4 oz (118.1 kg)   BMI 49.20 kg/m      General:  Patient presents to clinic in no apparent distress.  Head: normocephalic atraumatic  Psychiatric:  Alert and oriented x3.   Respiratory: unlabored breathing; no cough  Integumentary:  Skin is uniformly warm, dry and pink.    Wound #1 Location: right leg  Size: 17L x 35W x 0.1depth.  Circumferential; irregular; no sinus tract present, Wound base: slough covered; white film on the periwound; malodorous; smells of anaerobes  No undermining present. Wound is full thickness. There is heavy drainage. Periwound: no denudement, erythema, induration, maceration or warmth.      PICC Single Lumen 09/02/21 Right Brachial vein lateral (Active)   Number of days: 153       PICC Single Lumen 10/13/21 Left Basilic (Active)   Number of days: 112       Wound Leg Other (comment);Ulceration (Active)   Number of days: 196     "   VASC Wound right lower leg (Active)   Pre Size Length 17 02/02/22 1500   Pre Size Width 35 02/02/22 1500   Pre Size Depth 0.1 02/02/22 1500   Pre Total Sq cm 595 02/02/22 1500   Number of days: 280            Circumferential volume measures:      Circumferential Measures 6/10/2021 6/16/2021 6/22/2021 6/29/2021 7/30/2021   Right just above MTP 25.5 23.6 24.8 24 23.3   Right Ankle 25.5 25.4 27 26 25.2   Right Widest Calf 61 56.6 58 59.5 61.0   Right Thigh Up 10cm 78 - - - 78.8   Right Knee to Ankle - - - - -   Left - just above MTP 23.5 22.6 23.8 23 24   Left Ankle 28 24.2 26.5 25.5 25   Left Widest Calf 54 53.6 54.6 53.5 51.5   Left Thigh Up 10cm 71 - - 68.5 77.2   Left Knee to Ankle - - - - -       Labs:    I personally reviewed the following lab results today and those on care everywhere    CRP   Date Value Ref Range Status   10/11/2021 2.8 (H) 0.0-<0.8 mg/dL Final     C-Reactive Protein High Sensitivity   Date Value Ref Range Status   10/20/2021 31.7 (H) 0.0 - 3.0 mg/L Final     Comment:     Low risk < 1.0 mg/L  Average risk 1.0 to 3.0 mg/L  High risk > 3.0 mg/L  Acute inflamation > 10.0 mg/L      Erythrocyte Sedimentation Rate   Date Value Ref Range Status   10/20/2021 55 (H) 0 - 20 mm/hr Final      Last Renal Panel:  Sodium   Date Value Ref Range Status   11/10/2021 142 136 - 145 mmol/L Final     Potassium   Date Value Ref Range Status   11/10/2021 4.4 3.5 - 5.0 mmol/L Final     Chloride   Date Value Ref Range Status   11/10/2021 104 98 - 107 mmol/L Final     Carbon Dioxide (CO2)   Date Value Ref Range Status   11/10/2021 24 22 - 31 mmol/L Final     Anion Gap   Date Value Ref Range Status   11/10/2021 14 5 - 18 mmol/L Final     Glucose   Date Value Ref Range Status   11/10/2021 107 70 - 125 mg/dL Final     Urea Nitrogen   Date Value Ref Range Status   11/10/2021 20 8 - 28 mg/dL Final     Creatinine   Date Value Ref Range Status   11/10/2021 0.86 0.60 - 1.10 mg/dL Final     GFR Estimate   Date Value Ref Range  Status   11/10/2021 67 >60 mL/min/1.73m2 Final     Comment:     As of July 11, 2021, eGFR is calculated by the CKD-EPI creatinine equation, without race adjustment. eGFR can be influenced by muscle mass, exercise, and diet. The reported eGFR is an estimation only and is only applicable if the renal function is stable.   09/26/2020 >60 >60 mL/min/1.73m2 Final     Calcium   Date Value Ref Range Status   11/10/2021 9.5 8.5 - 10.5 mg/dL Final     Albumin   Date Value Ref Range Status   08/02/2021 3.4 (L) 3.5 - 5.0 g/dL Final      Lab Results   Component Value Date    WBC 9.7 10/20/2021     Lab Results   Component Value Date    RBC 3.94 10/20/2021     Lab Results   Component Value Date    HGB 10.2 10/20/2021     Lab Results   Component Value Date    HCT 33.2 10/20/2021     No components found for: MCT  Lab Results   Component Value Date    MCV 84 10/20/2021     Lab Results   Component Value Date    MCH 25.9 10/20/2021     Lab Results   Component Value Date    MCHC 30.7 10/20/2021     Lab Results   Component Value Date    RDW 14.6 10/20/2021     Lab Results   Component Value Date     10/20/2021      Lab Results   Component Value Date    A1C 5.9 06/23/2016      No results found for: TSH   No results found for: VITDT                Impression:  Encounter Diagnoses           Name Primary?     Chronic ulcer of right leg, with fat layer exposed (H) Yes     Acquired lymphedema of leg      Recurrent infection of skin      Scar condition and fibrosis of skin      Morbid obesity (H)      Venous insufficiency of both lower extremities      Venous hypertension of lower extremity, bilateral      Morbid obesity with BMI of 50.0-59.9, adult (H)                     Are any of these wounds new today: No; Location: na    Assessment/Plan:          1. Debridement: After discussion of risk factors and verbal consent was obtained 2% Lidocaine HCL jelly was applied, under clean conditions, the right leg ulceration(s) were debrided using  ashutosh. Devitalized and nonviable tissue, along with any fibrin and slough, was removed to improve granulation tissue formation, stimulate wound healing, decrease overall bacteria load, disrupt biofilm formation and decrease edge senescence.  Total excisional debridement was 595 sq cm from the epidermis/dermis area and into the subcutaneous tissue with a depth of 0.1-0.2 cm.   Ulcers were improved afterwards and .  Measures were unchanged after debridement.       2.  Wound treatment: wound treatment will include irrigation and dressings to promote autolytic debridement which will include:prognosis for healing is poor; she does not tolerate aggressive debridement; will refer to Dr. Bullock at [facility] to see if he would consider surgical debridement; her wounds are worse with foul odor today; I obtained skin sample for koh testing; I will add metrogel to address odor and possible anaerobic growth; she is following with ID they do not want additional antibiotics at this time; biopsies indicated venous stasis; will have patient twice per day; wash with dilute hibiclens; then apply zinc oxide to periwound skin; then apply metrogel onto sorbact and apply to wounds cover with abd; rolled gauze     If for some reason the patient is not able to get their dressing(s) changed as outlined above (due to illness, lack of supplies, lack of help) please do the following: remove old, soiled dressings; wash the wounds with saline; pat dry; apply ABD pad or other absorbant pad and secure with rolled gauze; avoid tape directly on your skin; patient instructed to call the clinic as soon as possible to let us know what the current issues are in receiving wound care. worse            3. Edema: will continue with elevation; lymphedema pump; and tubular compression with short stretch. The compression wraps were applied today in clinic. If a 2 layer or 4 layer compression wrap is being used; these are safe to have on for ONLY  7 days. If for some reason the patient is not able to get the wrap(s) changed (due to illness; lack of supplies, lack of help, lack of transportation) please do the following: unwrap the old 2 or 4 layer compression wrap; avoid using scissors as you could cut your skin and cause wounds; use tubular compression when available. Call to reschedule your home care or clinic visit appointment as soon as possible.  Stable            4. Nutrition: without significant weight loss this will be a continued and progressive issue           5. Offloading: na     Patient will follow up with me in 4 weeks for reevaluation or Dr. Bullock with assume care. They were instructed to call the clinic sooner with any signs or symptoms of infection or any further questions/concerns. Answered all questions.          Staci Junior DNP, RN, CNP, CWOCN, CFCN, CLT  [facility]   [phone]        This note was electronically signed by Staci Junior NP

## 2022-02-03 LAB
KOH PREPARATION: NORMAL
KOH PREPARATION: NORMAL

## 2022-02-03 NOTE — TELEPHONE ENCOUNTER
Date: 2/14/2022 Status: Scheduled   Time: 2:00 PM Length: 20   Visit Type: VIDEO VISIT RETURN [9304] Copay: $0.00   Provider: Kathe Vargas MD [facility]   Bill Area: Infectious Disease [facility]

## 2022-02-10 NOTE — PROGRESS NOTES
This is a recent snapshot of the patient's [facility] medical record.  For current drug dose and complete information and questions, call [phone] or In Basket pool, [facility]v home infusion ([id number])  CSN Number:  [id number]

## 2022-02-14 ENCOUNTER — TELEPHONE (OUTPATIENT)
Dept: VASCULAR SURGERY | Facility: CLINIC | Age: 77
End: 2022-02-14

## 2022-02-14 ENCOUNTER — VIRTUAL VISIT (OUTPATIENT)
Dept: INFECTIOUS DISEASES | Facility: CLINIC | Age: 77
End: 2022-02-14
Payer: COMMERCIAL

## 2022-02-14 DIAGNOSIS — L03.115 CELLULITIS OF RIGHT LOWER EXTREMITY: Primary | ICD-10-CM

## 2022-02-14 PROCEDURE — 99213 OFFICE O/P EST LOW 20 MIN: CPT | Mod: GT | Performed by: INTERNAL MEDICINE

## 2022-02-14 RX ORDER — DOXYCYCLINE 100 MG/1
100 CAPSULE ORAL 2 TIMES DAILY
Qty: 14 CAPSULE | Refills: 1 | Status: SHIPPED | OUTPATIENT
Start: 2022-02-14 | End: 2022-02-21

## 2022-02-14 RX ORDER — METRONIDAZOLE 500 MG/1
500 TABLET ORAL 2 TIMES DAILY
Qty: 14 TABLET | Refills: 1 | Status: SHIPPED | OUTPATIENT
Start: 2022-02-14 | End: 2022-02-21

## 2022-02-14 NOTE — TELEPHONE ENCOUNTER
Patient is calling to up date Staci that the metronidazole is causing a burning sensation.  She had a virtual apt with Dr. Vargas and discussed this today.

## 2022-02-14 NOTE — PROGRESS NOTES
Elizabeth is a 76 year old who is being evaluated via a billable video visit.      How would you like to obtain your AVS? MyChart  If the video visit is dropped, the invitation should be resent by: Text to cell phone: [phone]  Will anyone else be joining your video visit? No    Video Start Time: 2:10 pm  Video-Visit Details    Type of service:  Video Visit    Video End Time: 2:23 pm    Originating Location (pt. Location): Home    Distant Location (provider location):  [place]    Platform used for Video Visit: Ronel Johnson is a 76 year old who is being evaluated via a billable video visit.    Previous photo:                  INFECTIOUS DISEASE [place] [facility] FOLLOW UP NOTE- VIRTUAL VISIT    Date: 02/14/2022   Patient Name: Elizabeth Kelley   YOB: 1945  MRN: [id number]        ASSESSMENT:    Fall on 1/9/2022, laceration of scalp, improving.  ED notes reviewed, sutured.  Denies headache, loss of consciousness.    RLE cellulitis with weeping ulcers, pseudomonas in wound.,  MRSA infe  ction, on IV vancomycin since 9/30/2021, and on iv meropenem since ~8/6 with some improvement.  Has completed IV antibiotic therapy  Pseudomonas is multidrug resistant  Improving on doxycycline    Burning with topical metronidazole      Wbc and CRP stable.  Previously    Concern about noninfectious underlying etiology such as pyoderma.  Dermatology referral requested and pending.    Redness at PICC site on R.  Switch to left side, with hypoallergenic dressing    Open wounds have significantly improved and slough has decreased  Patient has completed antibiotic therapy    Photos currently 1/10/2022 above    Previous photos 12/13/2021.      Photo 11/8/2021                    Photos on 10/18/2021:            Inguinal candidiasis- resolved  Dermatology, pyoderma gangrenosum versus stasis dermatitis. Pathology diagnosis Venous stasis      PLAN:    Improved, seen dermatology  Empiric Doxycycline and  Metronidazole  Topical metronidazole was burning--- patient will call Wound clinic  Has follow-up wound care appointment.  Lymphedema management    Please see if Primary can help with the pain (appears to be related to nerve/wound healing).   Lymphedema management    Follow up in 2 to 3 weeks.    Kathe Vargas MD  [facility]  [phone]          Total Time Spent 25 minutes with >50% of the time spent in counseling, education and care coordination.       ______________________________________________________________________    SUBJECTIVE / INTERVAL HISTORY:     Doing better, wound improving, localized slough    Burning with topical met  Overall edema better  Seen by dermatology and biopsy done, awaiting results  Concerned about pyoderma gangrenosum versus stasis dermatitis      Previous: Virtual visit, had a fall on 1/9/2022.  Appears mechanical according to the patient  Laceration, went to ED.  Notes reviewed  Leg wound improving, patient tolerating doxycycline    Previous note: Elizabeth Kelley returns for follow up of RLE cellulitis.  Slough improved, painful.  Wounds improving, healed      Pain is decreased, 2 areas with pain, and worse with being on feet    Awaiting dermatology consultation, scheduled    Previous note:    Pain improved, wounds are less deep in certain areas.  Continues to have some slough  Wound care following  Tolerating vancomycin and meropenem    9/30:    Continues on meropenem. Tolerating this and infusions. No rash or diarrhea. Difficulty with tape, but stable. Notices more pain, especially in am. Pain awakens her. Takes tylenol 1gm in am and again right before bed. Not on narcotic pain meds. Rarely takes alleve. Pain at other times is tolerable.  Sees wound care every 2 weeks and dresses her own wounds bid with nonadherent dressing.    ROS: All other systems negative except as listed above.      Current Outpatient Medications:      aspirin 81 mg chewable  "tablet, Take 81 mg by mouth every evening , Disp: , Rfl:      buPROPion (WELLBUTRIN SR) 150 MG 12 hr tablet, Take 150 mg by mouth every morning , Disp: , Rfl:      cholecalciferol, vitamin D3, 1,000 unit tablet, [CHOLECALCIFEROL, VITAMIN D3, 1,000 UNIT TABLET] Take 2,000 Units by mouth daily., Disp: , Rfl:      citalopram (CELEXA) 40 MG tablet, Take 40 mg by mouth every morning , Disp: , Rfl:      doxycycline monohydrate (ADOXA) 100 MG tablet, Take 100 mg by mouth 2 times daily For 28 days, Disp: , Rfl:      furosemide (LASIX) 20 MG tablet, Take 20 mg by mouth 2 times daily AM and afternoon (4PM), Disp: , Rfl:      HYDROcodone-acetaminophen (NORCO) 5-325 MG tablet, TAKE 1 TABLET BY MOUTH EVERY 8 HOURS AS NEEDED FOR 4 DAYS, Disp: , Rfl:      metroNIDAZOLE (METROGEL) 0.75 % external gel, Apply topically 2 times daily Apply 3 grams BID to the leg wound, Disp: 45 g, Rfl: 3     multivitamin therapeutic (THERAGRAN) tablet, [MULTIVITAMIN THERAPEUTIC (THERAGRAN) TABLET] Take 1 tablet by mouth daily., Disp: , Rfl:      multivitamin w/minerals (CENTRUM ADULTS) tablet, as directed, Disp: , Rfl:      naproxen sodium (ALEVE) 220 MG tablet, Take 440 mg by mouth daily as needed , Disp: , Rfl:      pantoprazole (PROTONIX) 40 MG tablet, [PANTOPRAZOLE (PROTONIX) 40 MG TABLET] Take 40 mg by mouth daily., Disp: , Rfl:      potassium chloride ER (KLOR-CON M) 20 MEQ CR tablet, Take 20 mEq by mouth every evening, Disp: , Rfl:      pregabalin (LYRICA) 75 MG capsule, 1 capsule, Disp: , Rfl:      rOPINIRole (REQUIP) 1 MG tablet, Take 1 mg by mouth At Bedtime, Disp: , Rfl:      simvastatin (ZOCOR) 40 MG tablet, [SIMVASTATIN (ZOCOR) 40 MG TABLET] Take 40 mg by mouth bedtime., Disp: , Rfl:      Skin Protectants, Misc. (INTERDRY 10\"X36\") SHEE, Externally apply 7 Units topically once as needed (change daily in groin rash/fold), Disp: 7 each, Rfl: 3     spironolactone (ALDACTONE) 25 MG tablet, [SPIRONOLACTONE (ALDACTONE) 25 MG TABLET] Take 25 mg " by mouth daily., Disp: , Rfl:       OBJECTIVE:  There were no vitals taken for this visit.    GEN: No acute distress.    RESPIRATORY:  Normal breathing pattern.   EXTREMITIES: chronic lymphedema  Skin: Ulceration with slough       Previous note:  SKIN/HAIR/NAILS: Inguinal candidiasis previous note  See above for photo from 12/13/2021   Right lower extremity:  Slough has significantly reduced. No undermining, no fluctuance or tunneling. Open ulcerations are tender  11/8/2021                  Picture from previous visit:       New Pictures from 9/30/21:  Anterior view, right leg      Medial view, right leg      Lateral view, right leg      Photos from 10/18/2021:                  Pertinent labs:    CRP   Date Value Ref Range Status   10/11/2021 2.8 (H) 0.0-<0.8 mg/dL Final     C-Reactive Protein High Sensitivity   Date Value Ref Range Status   10/20/2021 31.7 (H) 0.0 - 3.0 mg/L Final     Comment:     Low risk < 1.0 mg/L  Average risk 1.0 to 3.0 mg/L  High risk > 3.0 mg/L  Acute inflamation > 10.0 mg/L     Last Comprehensive Metabolic Panel:  Sodium   Date Value Ref Range Status   11/10/2021 142 136 - 145 mmol/L Final     Potassium   Date Value Ref Range Status   11/10/2021 4.4 3.5 - 5.0 mmol/L Final     Chloride   Date Value Ref Range Status   11/10/2021 104 98 - 107 mmol/L Final     Carbon Dioxide (CO2)   Date Value Ref Range Status   11/10/2021 24 22 - 31 mmol/L Final     Anion Gap   Date Value Ref Range Status   11/10/2021 14 5 - 18 mmol/L Final     Glucose   Date Value Ref Range Status   11/10/2021 107 70 - 125 mg/dL Final     Urea Nitrogen   Date Value Ref Range Status   11/10/2021 20 8 - 28 mg/dL Final     Creatinine   Date Value Ref Range Status   11/10/2021 0.86 0.60 - 1.10 mg/dL Final     GFR Estimate   Date Value Ref Range Status   11/10/2021 67 >60 mL/min/1.73m2 Final     Comment:     As of July 11, 2021, eGFR is calculated by the CKD-EPI creatinine equation, without race adjustment. eGFR can be influenced  by muscle mass, exercise, and diet. The reported eGFR is an estimation only and is only applicable if the renal function is stable.   09/26/2020 >60 >60 mL/min/1.73m2 Final     Calcium   Date Value Ref Range Status   11/10/2021 9.5 8.5 - 10.5 mg/dL Final     CBC RESULTS:   Recent Labs   Lab Test 09/07/21  1000   WBC 9.4   RBC 4.12   HGB 10.9*   HCT 35.0   MCV 85   MCH 26.5   MCHC 31.1*   RDW 14.6          MICROBIOLOGY DATA:    T Cell Subset:  WBC Count   Date Value Ref Range Status   10/20/2021 9.7 4.0 - 11.0 10e3/uL Final     % Lymphocytes   Date Value Ref Range Status   10/20/2021 11 % Final         RADIOLOGY:    No results found for this or any previous visit (from the past 744 hour(s)).   Collected Updated Procedure Result Status    10/18/2021 1511 10/18/2021 1607 Wound Aerobic Bacterial Culture Routine with Gram Stain [[id number]]   Wound from Leg, Below Knee, Right    In process Component Value   No component results              08/02/2021 1531 08/04/2021 1340 Wound Aerobic Bacterial Culture Routine [[id number]]    (Abnormal)   Wound from Leg, Below Knee, Right    Final result Component Value   Culture 4+ Pseudomonas aeruginosaAbnormal          Susceptibility     Pseudomonas aeruginosa     MARYBEL     Aztreonam >16 ug/mL Resistant     Cefepime 8 ug/mL Susceptible     Ceftazidime >16 ug/mL Resistant     Ciprofloxacin >2 ug/mL Resistant     Gentamicin 8 ug/mL Intermediate     Levofloxacin >4 ug/mL Resistant     Meropenem 4 ug/mL Intermediate     Piperacillin/Tazobactam >64/4 ug/mL Resistant     Tobramycin <=2 ug/mL Susceptible                  07/21/2021 2238 07/21/2021 2332 Asymptomatic COVID-19 Virus (Coronavirus) by PCR Nasopharyngeal [[id number]]    Swab from Nasopharyngeal    Final result Component Value   No component results           07/21/2021 2238 07/21/2021 2332 SARS-COV2 (COVID-19) Virus RT-PCR [[id number]]    Swab from Nasopharyngeal    Final result Component Value   SARS CoV2 PCR  Negative   NEGATIVE: SARS-CoV-2 (COVID-19) RNA not detected, presumed negative.           07/21/2021 1855 07/26/2021 2231 Blood Culture Peripheral Blood [[id number]]   Peripheral Blood    Final result Component Value   Culture No Growth              07/21/2021 1855 07/26/2021 2231 Blood Culture Peripheral Blood [[id number]]   Peripheral Blood    Final result Component Value   Culture No Growth

## 2022-02-14 NOTE — PATIENT INSTRUCTIONS
Ramon Johnson,  Thank you for taking the time to see us today via virtual visit.   Doxycycline and Metronidazole x 2 weeks  Follow up with wound clinic  Follow up with ID in 2 -3 weeks

## 2022-02-15 NOTE — TELEPHONE ENCOUNTER
Notified patient Staci Junior CONSTANZA said to discontinue the metro gel ; I see she has an appt with Dr. Bullock on 3/3 and me on 3/2 ; it is fine to cancel the appt with me then and we will see what his opinion is .    Patient understanding of plan of care.

## 2022-02-21 ENCOUNTER — TELEPHONE (OUTPATIENT)
Dept: VASCULAR SURGERY | Facility: CLINIC | Age: 77
End: 2022-02-21
Payer: COMMERCIAL

## 2022-02-21 ENCOUNTER — TELEPHONE (OUTPATIENT)
Dept: INFECTIOUS DISEASES | Facility: CLINIC | Age: 77
End: 2022-02-21
Payer: COMMERCIAL

## 2022-02-21 NOTE — TELEPHONE ENCOUNTER
Patient called and is requesting we order more Sorbact for her as she is almost out.      She can be reached at [phone]

## 2022-02-21 NOTE — TELEPHONE ENCOUNTER
Pt calling stating that she began experiencing pain in her R leg Friday, which worsened through the weekend and was accompanied by a low grade fever in the a.m. yesterday of 100.0. The pt denies any increased drainage, odor, or redness. The pt states that she stopped her abx Friday because she was not thinking clearly. The pt states that she will restart the doxycycline, however does not want to restart the metronidazole p.o. because she had a reaction to the topical form. The pt states that she took aleve and hydrocodone yesterday, which was helpful. I advised the pt to go to the ER if her sx or fever worsen. I will inform the MD of what is going on and call her with further recommendations.

## 2022-02-28 ENCOUNTER — VIRTUAL VISIT (OUTPATIENT)
Dept: INFECTIOUS DISEASES | Facility: CLINIC | Age: 77
End: 2022-02-28
Payer: COMMERCIAL

## 2022-02-28 DIAGNOSIS — L03.115 CELLULITIS OF RIGHT LOWER EXTREMITY: Primary | ICD-10-CM

## 2022-02-28 PROCEDURE — 99213 OFFICE O/P EST LOW 20 MIN: CPT | Mod: GT | Performed by: INTERNAL MEDICINE

## 2022-02-28 RX ORDER — LEVOFLOXACIN 500 MG/1
500 TABLET, FILM COATED ORAL DAILY
Qty: 7 TABLET | Refills: 0 | Status: SHIPPED | OUTPATIENT
Start: 2022-02-28 | End: 2022-03-07

## 2022-02-28 RX ORDER — DOXYCYCLINE 100 MG/1
100 TABLET ORAL 2 TIMES DAILY
Qty: 14 TABLET | Refills: 0 | Status: SHIPPED | OUTPATIENT
Start: 2022-02-28 | End: 2022-03-07

## 2022-02-28 NOTE — PROGRESS NOTES
Elizabeth is a 76 year old who is being evaluated via a billable video visit.      How would you like to obtain your AVS? MyChart  If the video visit is dropped, the invitation should be resent by: Text to cell phone: [phone]  Will anyone else be joining your video visit? No      Video Start Time: 2:25   Video-Visit Details    Type of service:  Video Visit    Video End Time:2:50 pm    Originating Location (pt. Location): Home    Distant Location (provider location):  [facility]     Platform used for Video Visit: Sent[employer]     Photo taken with patient's permission on 2/28/2022 virtual visit: Right lower extremity:          Elizabeth is a 76 year old who is being evaluated via a billable video visit.        INFECTIOUS DISEASE [facility] FOLLOW UP NOTE- VIRTUAL VISIT    Date: 02/28/2022   Patient Name: Elizabeth Kelley   YOB: 1945  MRN: [id number]      ASSESSMENT:        RLE cellulitis with weeping ulcers, pseudomonas in wound.,  MRSA infe  ction, on IV vancomycin since 9/30/2021, and on iv meropenem since ~8/6 with some improvement.  Has completed IV antibiotic therapy  Pseudomonas is multidrug resistant  Improving on doxycycline    Burning with topical metronidazole, unable to tolerate lymphedema treatment as the machine is not working.  Pain  Fever resolved      Wbc and CRP stable.  Previously    Concern about noninfectious underlying etiology such as pyoderma.  Dermatology referral requested and biopsy showed stasis.    Open wounds have significantly improved and slough has decreased  Patient has completed antibiotic therapy      Fall on 1/9/2022, laceration of scalp, improving.  ED notes reviewed, sutured.  Denies headache, loss of consciousness.        Previous photos 12/13/2021.      Photo 11/8/2021                    Photos on 10/18/2021:            Inguinal candidiasis- resolved  Dermatology, pyoderma gangrenosum versus stasis dermatitis. Pathology diagnosis Venous  stasis      PLAN:    Patient is awaiting consult patient with Dr. Bullock  Empiric Doxycycline and levofloxacin awaiting appointment, due to increased slough, pain, possible secondary cellulitis.  Patient has had repeated courses of antibiotics and due to multidrug-resistant organisms, would prefer if antimicrobials can be stopped.  Defer steroid, debridement, or any other topical treatment to Dr. Bullock.    Topical metronidazole was burning--- patient will call Wound clinic  Has follow-up wound care appointment.  Lymphedema management    Please see if Primary can help with the pain (appears to be related to nerve/wound healing).  A pain consult may be needed  Lymphedema management    If condition worsens go to ER.  Patient expressed understanding    Kathe Vargas MD  [facility]  [phone]          Total Time Spent 25 minutes with >50% of the time spent in counseling, education and care coordination.       ______________________________________________________________________    SUBJECTIVE / INTERVAL HISTORY:     Wound became worse, slight improvement with doxycycline.  Lot of slough.  Has not been able to tolerate treatment topically, lymphedema machine is not working  Previous notes    Burning with topical met  Overall edema better  Seen by dermatology and biopsy done, awaiting results  Concerned about pyoderma gangrenosum versus stasis dermatitis      Previous: Virtual visit, had a fall on 1/9/2022.  Appears mechanical according to the patient  Laceration, went to ED.  Notes reviewed  Leg wound improving, patient tolerating doxycycline    Previous note: Elizabeth Kelley returns for follow up of RLE cellulitis.  Slough improved, painful.  Wounds improving, healed      Pain is decreased, 2 areas with pain, and worse with being on feet    Awaiting dermatology consultation, scheduled    Previous note:    Pain improved, wounds are less deep in certain areas.  Continues to have some  slough  Wound care following  Tolerating vancomycin and meropenem    9/30:    Continues on meropenem. Tolerating this and infusions. No rash or diarrhea. Difficulty with tape, but stable. Notices more pain, especially in am. Pain awakens her. Takes tylenol 1gm in am and again right before bed. Not on narcotic pain meds. Rarely takes alleve. Pain at other times is tolerable.  Sees wound care every 2 weeks and dresses her own wounds bid with nonadherent dressing.    ROS: All other systems negative except as listed above.      Current Outpatient Medications:      doxycycline monohydrate (ADOXA) 100 MG tablet, Take 1 tablet (100 mg) by mouth 2 times daily for 7 days For 28 days, Disp: 14 tablet, Rfl: 0     levofloxacin (LEVAQUIN) 500 MG tablet, Take 1 tablet (500 mg) by mouth daily for 7 days, Disp: 7 tablet, Rfl: 0     aspirin 81 mg chewable tablet, Take 81 mg by mouth every evening , Disp: , Rfl:      buPROPion (WELLBUTRIN SR) 150 MG 12 hr tablet, Take 150 mg by mouth every morning , Disp: , Rfl:      cholecalciferol, vitamin D3, 1,000 unit tablet, [CHOLECALCIFEROL, VITAMIN D3, 1,000 UNIT TABLET] Take 2,000 Units by mouth daily., Disp: , Rfl:      citalopram (CELEXA) 40 MG tablet, Take 40 mg by mouth every morning , Disp: , Rfl:      furosemide (LASIX) 20 MG tablet, Take 20 mg by mouth 2 times daily AM and afternoon (4PM), Disp: , Rfl:      HYDROcodone-acetaminophen (NORCO) 5-325 MG tablet, TAKE 1 TABLET BY MOUTH EVERY 8 HOURS AS NEEDED FOR 4 DAYS, Disp: , Rfl:      metroNIDAZOLE (METROGEL) 0.75 % external gel, Apply topically 2 times daily Apply 3 grams BID to the leg wound, Disp: 45 g, Rfl: 3     multivitamin therapeutic (THERAGRAN) tablet, [MULTIVITAMIN THERAPEUTIC (THERAGRAN) TABLET] Take 1 tablet by mouth daily., Disp: , Rfl:      multivitamin w/minerals (CENTRUM ADULTS) tablet, as directed, Disp: , Rfl:      naproxen sodium (ALEVE) 220 MG tablet, Take 440 mg by mouth daily as needed , Disp: , Rfl:       "pantoprazole (PROTONIX) 40 MG tablet, [PANTOPRAZOLE (PROTONIX) 40 MG TABLET] Take 40 mg by mouth daily., Disp: , Rfl:      potassium chloride ER (KLOR-CON M) 20 MEQ CR tablet, Take 20 mEq by mouth every evening, Disp: , Rfl:      pregabalin (LYRICA) 75 MG capsule, 1 capsule, Disp: , Rfl:      rOPINIRole (REQUIP) 1 MG tablet, Take 1 mg by mouth At Bedtime, Disp: , Rfl:      simvastatin (ZOCOR) 40 MG tablet, [SIMVASTATIN (ZOCOR) 40 MG TABLET] Take 40 mg by mouth bedtime., Disp: , Rfl:      Skin Protectants, Misc. (INTERDRY 10\"X36\") SHEE, Externally apply 7 Units topically once as needed (change daily in groin rash/fold), Disp: 7 each, Rfl: 3     spironolactone (ALDACTONE) 25 MG tablet, [SPIRONOLACTONE (ALDACTONE) 25 MG TABLET] Take 25 mg by mouth daily., Disp: , Rfl:       OBJECTIVE:  There were no vitals taken for this visit.    GEN: No acute distress.    RESPIRATORY:  Normal breathing pattern.   EXTREMITIES: chronic lymphedema  Skin: Ulceration with slough   See photo above.    Previous note:  SKIN/HAIR/NAILS: Inguinal candidiasis previous note  See above for photo from 12/13/2021   Right lower extremity:  Slough has significantly reduced. No undermining, no fluctuance or tunneling. Open ulcerations are tender  11/8/2021                  Picture from previous visit:       New Pictures from 9/30/21:  Anterior view, right leg      Medial view, right leg      Lateral view, right leg      Photos from 10/18/2021:                  Pertinent labs:    CRP   Date Value Ref Range Status   10/11/2021 2.8 (H) 0.0-<0.8 mg/dL Final     C-Reactive Protein High Sensitivity   Date Value Ref Range Status   10/20/2021 31.7 (H) 0.0 - 3.0 mg/L Final     Comment:     Low risk < 1.0 mg/L  Average risk 1.0 to 3.0 mg/L  High risk > 3.0 mg/L  Acute inflamation > 10.0 mg/L     Last Comprehensive Metabolic Panel:  Sodium   Date Value Ref Range Status   11/10/2021 142 136 - 145 mmol/L Final     Potassium   Date Value Ref Range Status "   11/10/2021 4.4 3.5 - 5.0 mmol/L Final     Chloride   Date Value Ref Range Status   11/10/2021 104 98 - 107 mmol/L Final     Carbon Dioxide (CO2)   Date Value Ref Range Status   11/10/2021 24 22 - 31 mmol/L Final     Anion Gap   Date Value Ref Range Status   11/10/2021 14 5 - 18 mmol/L Final     Glucose   Date Value Ref Range Status   11/10/2021 107 70 - 125 mg/dL Final     Urea Nitrogen   Date Value Ref Range Status   11/10/2021 20 8 - 28 mg/dL Final     Creatinine   Date Value Ref Range Status   11/10/2021 0.86 0.60 - 1.10 mg/dL Final     GFR Estimate   Date Value Ref Range Status   11/10/2021 67 >60 mL/min/1.73m2 Final     Comment:     As of July 11, 2021, eGFR is calculated by the CKD-EPI creatinine equation, without race adjustment. eGFR can be influenced by muscle mass, exercise, and diet. The reported eGFR is an estimation only and is only applicable if the renal function is stable.   09/26/2020 >60 >60 mL/min/1.73m2 Final     Calcium   Date Value Ref Range Status   11/10/2021 9.5 8.5 - 10.5 mg/dL Final     CBC RESULTS:   Recent Labs   Lab Test 09/07/21  1000   WBC 9.4   RBC 4.12   HGB 10.9*   HCT 35.0   MCV 85   MCH 26.5   MCHC 31.1*   RDW 14.6          MICROBIOLOGY DATA:    T Cell Subset:  WBC Count   Date Value Ref Range Status   10/20/2021 9.7 4.0 - 11.0 10e3/uL Final     % Lymphocytes   Date Value Ref Range Status   10/20/2021 11 % Final         RADIOLOGY:    No results found for this or any previous visit (from the past 744 hour(s)).   Collected Updated Procedure Result Status    10/18/2021 1511 10/18/2021 1607 Wound Aerobic Bacterial Culture Routine with Gram Stain [[id number]]   Wound from Leg, Below Knee, Right    In process Component Value   No component results              08/02/2021 1531 08/04/2021 1340 Wound Aerobic Bacterial Culture Routine [[id number]]    (Abnormal)   Wound from Leg, Below Knee, Right    Final result Component Value   Culture 4+ Pseudomonas aeruginosaAbnormal           Susceptibility     Pseudomonas aeruginosa     MARYBEL     Aztreonam >16 ug/mL Resistant     Cefepime 8 ug/mL Susceptible     Ceftazidime >16 ug/mL Resistant     Ciprofloxacin >2 ug/mL Resistant     Gentamicin 8 ug/mL Intermediate     Levofloxacin >4 ug/mL Resistant     Meropenem 4 ug/mL Intermediate     Piperacillin/Tazobactam >64/4 ug/mL Resistant     Tobramycin <=2 ug/mL Susceptible                  07/21/2021 2238 07/21/2021 2332 Asymptomatic COVID-19 Virus (Coronavirus) by PCR Nasopharyngeal [[id number]]    Swab from Nasopharyngeal    Final result Component Value   No component results           07/21/2021 2238 07/21/2021 2332 SARS-COV2 (COVID-19) Virus RT-PCR [[id number]]    Swab from Nasopharyngeal    Final result Component Value   SARS CoV2 PCR Negative   NEGATIVE: SARS-CoV-2 (COVID-19) RNA not detected, presumed negative.           07/21/2021 1855 07/26/2021 2231 Blood Culture Peripheral Blood [[id number]]   Peripheral Blood    Final result Component Value   Culture No Growth              07/21/2021 1855 07/26/2021 2231 Blood Culture Peripheral Blood [[id number]]   Peripheral Blood    Final result Component Value   Culture No Growth

## 2022-02-28 NOTE — PATIENT INSTRUCTIONS
Ramon Johnson,  Please go to ER if fever recurs or condition worsens.  We are awaiting recommendations in consultation by Dr. Bullock.  In the past you have had courses with IV meropenem, doxycycline, levofloxacin.  The Pseudomonas aeruginosa isolated was multidrug-resistant in the past.  -Please work on lymphedema treatment with the specialist for lymphedema, and request for assistance to fix the machine as it has not been functioning properly  -Dermatology consultation has been completed and reports are scanned in the chart.  Biopsy showed venous stasis.  It did not show pyoderma gangrenosum at this point.  Defer steroid for anti-inflammatory to Dr. Bullock.  May need repeat biopsy  -Currently on doxycycline and levofloxacin for 5 to 7 days until seen by wound specialist.  You may need repeat cultures  -Please call us with questions    Thank you for taking the time for virtual visit today.

## 2022-03-10 ENCOUNTER — HOSPITAL ENCOUNTER (OUTPATIENT)
Dept: WOUND CARE | Facility: CLINIC | Age: 77
Discharge: HOME | End: 2022-03-10
Attending: SURGERY | Admitting: SURGERY
Payer: COMMERCIAL

## 2022-03-10 VITALS
WEIGHT: 265.8 LBS | RESPIRATION RATE: 20 BRPM | BODY MASS INDEX: 50.18 KG/M2 | HEIGHT: 61 IN | DIASTOLIC BLOOD PRESSURE: 76 MMHG | HEART RATE: 83 BPM | TEMPERATURE: 98.1 F | SYSTOLIC BLOOD PRESSURE: 154 MMHG

## 2022-03-10 DIAGNOSIS — L90.5 SCAR CONDITION AND FIBROSIS OF SKIN: ICD-10-CM

## 2022-03-10 DIAGNOSIS — I87.2 VENOUS INSUFFICIENCY OF BOTH LOWER EXTREMITIES: ICD-10-CM

## 2022-03-10 DIAGNOSIS — I87.303 VENOUS HYPERTENSION OF LOWER EXTREMITY, BILATERAL: ICD-10-CM

## 2022-03-10 DIAGNOSIS — L97.912 CHRONIC ULCER OF RIGHT LEG, WITH FAT LAYER EXPOSED (H): ICD-10-CM

## 2022-03-10 DIAGNOSIS — E66.01 MORBID OBESITY (H): ICD-10-CM

## 2022-03-10 DIAGNOSIS — L97.912 ULCER OF RIGHT LOWER EXTREMITY WITH FAT LAYER EXPOSED (H): Primary | ICD-10-CM

## 2022-03-10 DIAGNOSIS — E66.01 MORBID OBESITY WITH BMI OF 50.0-59.9, ADULT (H): ICD-10-CM

## 2022-03-10 DIAGNOSIS — I89.0 ACQUIRED LYMPHEDEMA OF LEG: ICD-10-CM

## 2022-03-10 DIAGNOSIS — L08.9 RECURRENT INFECTION OF SKIN: ICD-10-CM

## 2022-03-10 PROBLEM — G89.29 CHRONIC PAIN: Status: ACTIVE | Noted: 2022-03-10

## 2022-03-10 PROCEDURE — 99204 OFFICE O/P NEW MOD 45 MIN: CPT | Performed by: SURGERY

## 2022-03-10 PROCEDURE — G0463 HOSPITAL OUTPT CLINIC VISIT: HCPCS | Mod: 25

## 2022-03-10 PROCEDURE — 97602 WOUND(S) CARE NON-SELECTIVE: CPT

## 2022-03-10 RX ORDER — LANOLIN ALCOHOL/MO/W.PET/CERES
1000 CREAM (GRAM) TOPICAL DAILY
Qty: 60 TABLET | Refills: 3 | Status: SHIPPED | OUTPATIENT
Start: 2022-03-10 | End: 2024-07-16

## 2022-03-10 RX ORDER — EPINEPHRINE 0.15 MG/.3ML
0.15 INJECTION INTRAMUSCULAR PRN
COMMUNITY
End: 2022-03-18

## 2022-03-10 RX ORDER — CEFAZOLIN SODIUM IN 0.9 % NACL 3 G/100 ML
3 INTRAVENOUS SOLUTION, PIGGYBACK (ML) INTRAVENOUS SEE ADMIN INSTRUCTIONS
Status: CANCELLED | OUTPATIENT
Start: 2022-03-10

## 2022-03-10 RX ORDER — CEFAZOLIN SODIUM IN 0.9 % NACL 3 G/100 ML
3 INTRAVENOUS SOLUTION, PIGGYBACK (ML) INTRAVENOUS
Status: CANCELLED | OUTPATIENT
Start: 2022-03-10

## 2022-03-10 NOTE — PROGRESS NOTES
"[facility] [place] Progress Note    Subject: Elizabeth Kelley valuation for chronic right lower extremity ulceration of greater than 6 months duration, initiated after she was shaving, slight ulceration after a small cut though in the setting of chronic lymphedema of the right and left lower extremities, previous surgical procedure with large incisions that are now keloid along the medial aspect of the right and left thighs, she states this was performed in 1989 for removal of\" lymph nodes\".  She denies history of diabetes, tobacco use, hepatic or renal dysfunction, congestive heart failure, in situ melanoma removed from left arm did not require further chemotherapy or other adjunct of therapy.  No history of deep venous thromboses or pulmonary bullae.  Wound has been significantly progressive, biofilm, pain, significant drainage.  History of vitamin D deficiency.  History of multiple sclerosis well-managed.  She does have a lymphedema pump, has not utilize recently due to the right leg cellulitis.  As utilization on the left leg only with utilization of the abdominal component for receptive decompression would not impact status of the right lower extremity.  PMH:   Past Medical History:   Diagnosis Date     Ankle contracture, left      Class 3 severe obesity due to excess calories without serious comorbidity with body mass index (BMI) of 45.0 to 49.9 in adult (H)      Combined gastric and duodenal ulcer      Depression      Dyslipidemia      Edema      Gait abnormality      GERD (gastroesophageal reflux disease)      Lymphedema of both lower extremities      Melanoma (H)     left upper arm     MS (multiple sclerosis) (H)      RLS (restless legs syndrome)      Skin ulcer of left lower leg (H)      Valgus deformity of both feet      Vitamin D deficiency      Patient Active Problem List   Diagnosis     Venous hypertension of lower extremity, bilateral     Acquired lymphedema of leg     Scar condition and " fibrosis of skin     Ankle contracture, left     Morbid obesity with BMI of 50.0-59.9, adult (H)     Valgus deformity of both feet     Flat feet, bilateral     Gait abnormality     MS (multiple sclerosis) (H)     Depression     Vitamin D deficiency     GERD (gastroesophageal reflux disease)     Essential hypertension     History of malignant melanoma of skin     Edema     Major depression, single episode, in complete remission (H)     Menopausal and postmenopausal disorder     Mixed hyperlipidemia     Morbid obesity (H)     Peripheral venous insufficiency     Postmenopausal     Restless legs     Cellulitis of right lower extremity     Social Hx:   Social History     Socioeconomic History     Marital status: Single     Spouse name: Not on file     Number of children: Not on file     Years of education: Not on file     Highest education level: Not on file   Occupational History     Not on file   Tobacco Use     Smoking status: Former Smoker     Packs/day: 0.25     Years: 12.00     Pack years: 3.00     Types: Cigarettes     Quit date: 1975     Years since quittin.2     Smokeless tobacco: Never Used     Tobacco comment: quit more than 30 years ago   Substance and Sexual Activity     Alcohol use: Yes     Alcohol/week: 1.0 standard drink     Drug use: No     Sexual activity: Yes     Partners: Male     Birth control/protection: Post-menopausal   Other Topics Concern     Not on file   Social History Narrative    .   Has significant other.  2 grown children.  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       Social Determinants of Health     Financial Resource Strain: Not on file   Food Insecurity: Not on file   Transportation Needs: Not on file   Physical Activity: Not on file   Stress: Not on file   Social Connections: Not on file   Intimate Partner Violence: Not on file   Housing Stability: Not on file       Surgical Hx:   Past Surgical History:   Procedure Laterality Date     ABLATION SAPHENOUS VEIN W/  "RFA Right 04/12/2021    Saphenous vein, anterior accessory saphenous vein, sclerotherapy of multiple veins.     COSMETIC SURGERY  1988    reduction of excess skin from weight loss     ESOPHAGOSCOPY, GASTROSCOPY, DUODENOSCOPY (EGD), COMBINED N/A 03/30/2015    Procedure: UPPER ENDOSCOPY with gastric biopsy;  Surgeon: Checo Fletcher MD;  Location: [facility];  Service:      MAMMOPLASTY REDUCTION Bilateral      MOHS MICROGRAPHIC PROCEDURE      left upper arm, melanoma     PICC SINGLE LUMEN PLACEMENT  8/13/2021          PICC SINGLE LUMEN PLACEMENT  9/2/2021          SPINE SURGERY      L5 area surgery, decompression       Allergies:    Allergies   Allergen Reactions     Other Environmental Allergy Anaphylaxis     Bees/Wasps Stings     Adhesive Tape Unknown     Adhesive [Mecrylate] Unknown     Other reaction(s): sores     Percocet [Oxycodone-Acetaminophen] Rash     Vicodin [Hydrocodone-Acetaminophen] Nausea and Vomiting and Hives       Medications:   Current Outpatient Medications   Medication     EPINEPHrine (EPIPEN JR) 0.15 MG/0.3ML injection 2-pack     aspirin 81 mg chewable tablet     buPROPion (WELLBUTRIN SR) 150 MG 12 hr tablet     cholecalciferol, vitamin D3, 1,000 unit tablet     citalopram (CELEXA) 40 MG tablet     furosemide (LASIX) 20 MG tablet     HYDROcodone-acetaminophen (NORCO) 5-325 MG tablet     metroNIDAZOLE (METROGEL) 0.75 % external gel     multivitamin therapeutic (THERAGRAN) tablet     multivitamin w/minerals (CENTRUM ADULTS) tablet     naproxen sodium (ALEVE) 220 MG tablet     pantoprazole (PROTONIX) 40 MG tablet     potassium chloride ER (KLOR-CON M) 20 MEQ CR tablet     pregabalin (LYRICA) 75 MG capsule     rOPINIRole (REQUIP) 1 MG tablet     simvastatin (ZOCOR) 40 MG tablet     Skin Protectants, Misc. (INTERDRY 10\"X36\") SHEE     spironolactone (ALDACTONE) 25 MG tablet     No current facility-administered medications for this encounter.       Labs:   Recent Labs   Lab Test 10/20/21  0835 " "08/10/21  1230 08/02/21  1552 06/14/18  1024 06/23/16  1130   ALBUMIN  --   --  3.4*   < >  --    HGB 10.2*   < > 11.5*   < >  --    WBC 9.7   < > 10.2   < >  --    A1C  --   --   --   --  5.9   CRP 31.7*   < > 7.9*   < >  --     < > = values in this interval not displayed.     Creatinine   Date Value Ref Range Status   11/10/2021 0.86 0.60 - 1.10 mg/dL Final     GFR Estimate   Date Value Ref Range Status   11/10/2021 67 >60 mL/min/1.73m2 Final     Comment:     As of July 11, 2021, eGFR is calculated by the CKD-EPI creatinine equation, without race adjustment. eGFR can be influenced by muscle mass, exercise, and diet. The reported eGFR is an estimation only and is only applicable if the renal function is stable.   09/26/2020 >60 >60 mL/min/1.73m2 Final     GFR Estimate If Black   Date Value Ref Range Status   09/26/2020 >60 >60 mL/min/1.73m2 Final     Lab Results   Component Value Date    WBC 9.7 10/20/2021     Lab Results   Component Value Date    RBC 3.94 10/20/2021     Lab Results   Component Value Date    HGB 10.2 10/20/2021     Lab Results   Component Value Date    HCT 33.2 10/20/2021     No components found for: MCT  Lab Results   Component Value Date    MCV 84 10/20/2021     Lab Results   Component Value Date    MCH 25.9 10/20/2021     Lab Results   Component Value Date    MCHC 30.7 10/20/2021     Lab Results   Component Value Date    RDW 14.6 10/20/2021     Lab Results   Component Value Date     10/20/2021          Nutrition requirements were discussed with patient today.  Objective:  BP (!) 154/76 (BP Location: Left arm, Patient Position: Sitting, Cuff Size: Adult Large)   Pulse 83   Temp 98.1  F (36.7  C) (Temporal)   Resp 20   Ht 1.549 m (5' 1\")   Wt 120.6 kg (265 lb 12.8 oz)   BMI 50.22 kg/m    Wound (used by OP I only) 03/10/22 0900 Right lower leg (Active)   Length (cm) 20 03/10/22 0900   Width (cm) 42 03/10/22 0900   Depth (cm) 0.5 03/10/22 0900   Wound (cm^2) 840 cm^2 03/10/22 0900 "   Wound Volume (cm^3) 420 cm^3 03/10/22 0900   Drainage Characteristics/Odor serous 03/10/22 0900   Drainage Amount copious 03/10/22 0900        General:  Patient is alert and orientated, no acute distress.  Conversant    Vascular: Nonpalpable pedal pulses due to significance of lower extremity lymphedema, ankle-brachial indices and chronic venous insufficiency duplex ultrasound from October 27, 2021 from [facility] wound clinic reviewed, no evidence of deep venous thromboses, no evidence of peripheral arterial disease.  Significant biofilm involving the right lower extremity ulceration with periwound inflammation, no heel ulceration, no toe ulcerations, stage III lymphedema bilaterally, large well-healed surgical incisions, with keloid scars, right and left medial thighs to groin.        Impression: Stage III lymphedema, chronic, with greater than 6-month history of right lower extremity ulceration with significant biofilm, drainage, pain, no ulcerations left lower extremity.      Plan: Ulcers benefits of surgical debridement discussed, Alejandro dermatome right lower extremity, subsequent application of negative pressure wound therapy, VAC vera flow with hypochlorous acid, would be changed at the bedside twice a week, when adequate development of granulation tissue, will return to the operating room for split-thickness skin graft application with negative pressure wound therapy application for 1 week, anticipate 2 to 3-week hospitalization for management of this large 840 cm  right lower extremity ulceration.  Begin utilizing lymphedema pump on left leg with abdominal component to achieve receptive decompression which will maximize nitric oxide production and not have a negative impact on right lower extremity ulceration.  She should bring her lymphedema pump to the hospital for utilization during the hospitalization.  Begin adjunctive micronutrients, micronized purified flavonoid fraction.  Ultimately when healed,  for scintigraphy could be performed to determine if there are viable options for lymphatic reconstruction, discussed the options of lymphatic reconstructive evaluation at [facility], Dr. Sammy Mcfarlane.  Anticipate surgical date of 2022.  Would require post hospitalization follow-up in wound clinic on a weekly basis for 2 layer wraps over split-thickness skin graft right lower extremity.             Further instructions from your care team       Elizabeth Kelley      1945    Medications/supplements to aid in healin. Vitamin D3 10,000 iu per day  2. Kaylie C 1,000 mg take twice daily  3. Vitamin B Complex with folic acid take 1 tablet daily   4. Vitamin B 12 1000 mcg daily  5. Vein Formula 500mg capsule twice daily order from www.[employer] or call [phone]. Alternatively, if Vein Formula is not available Hesperidin Diosmin 1000mg could be ordered from [employer]. John, Juan Manuel are good brands.    Use lymphedema pump for 1 hour twice a day to the left leg and waist    Wound care recommendations to right anterior lower leg, right lateral lower leg, right medial lower leg, right posterior lower leg:  Soak the lower leg in a warm epsom salt soak for 60 minutes daily  Then apply dilute vinegar solution to 1 kerlix and apply to wounds. Then cover with 8x9 kerramax. Secure with 1 kerlix and tape.  Change twice daily.    To left lower leg: Apply navy stripe edemawear from toes to calf and red stripe edemawear from calf to as far up the thigh as possible. Edemawear should be worn 24/7 unless bathing/showering or changing the dressing. DO NOT CUT THE EDEMAWEAR. IF IT IS TOO LONG THEN CUFF THE EDEMAWEAR (the edemawear can shrink length wise with washing)    How to prepare the solution:    1. Mix 2 tablespoons of white household vinegar with 2 cups of water  2. Store in the refrigerator and use for up to 5 days    How to use the solution:    1. Soak gauze with vinegar solution and apply to wound for  5 mintues  2. Remove wet gauze  3. Perform daily       M. Sergey Bullock M.D. March 10, 2022      Call us at [phone] if you have any questions about your wounds, have redness or swelling around your wound, have a fever of 101 or greater or if you have any other problems or concerns. We answer the phone Monday through Friday 8 am to 4 pm, please leave a message as we check the voicemail frequently throughout the day.     If you had a positive experience please indicate that on your patient satisfaction survey form that [facility] will be sending you.    It was a pleasure meeting with you today.  Thank you for allowing me and my team the privilege of caring for you today.  YOU are the reason we are here, and I truly hope we provided you with the excellent service you deserve.  Please let us know if there is anything else we can do for you so that we can be sure you are leaving completely satisfied with your care experience.      If you have any billing related questions please call the [facility] Business office at [phone]. The clinic staff does not handle billing related matters.        Gabriele Bullock MD on 3/10/2022 at 9:14 AM            Dictated using Dragon voice recognition software which may result in transcription errors

## 2022-03-10 NOTE — DISCHARGE INSTRUCTIONS
Elizabeth Kelley      1945    Medications/supplements to aid in healin. Vitamin D3 10,000 iu per day  2. Kaylie C 1,000 mg take twice daily  3. Vitamin B Complex with folic acid take 1 tablet daily   4. Vitamin B 12 1000 mcg daily  5. Vein Formula 500mg capsule twice daily order from www.[employer] or call [phone]. Alternatively, if Vein Formula is not available Hesperidin Diosmin 1000mg could be ordered from [employer]. Juan Manuel Lopez are good brands.    Use lymphedema pump for 1 hour twice a day to the left leg and waist    Wound care recommendations to right anterior lower leg, right lateral lower leg, right medial lower leg, right posterior lower leg:  Soak the lower leg in a warm epsom salt soak for 60 minutes daily  Then apply dilute vinegar solution to 1 kerlix and apply to wounds. Then cover with 8x9 kerramax. Secure with 1 kerlix and tape.  Change twice daily.    To left lower leg: Apply navy stripe edemawear from toes to calf and red stripe edemawear from calf to as far up the thigh as possible. Edemawear should be worn 24/ unless bathing/showering or changing the dressing. DO NOT CUT THE EDEMAWEAR. IF IT IS TOO LONG THEN CUFF THE EDEMAWEAR (the edemawear can shrink length wise with washing)    How to prepare the solution:    1. Mix 2 tablespoons of white household vinegar with 2 cups of water  2. Store in the refrigerator and use for up to 5 days    How to use the solution:    1. Soak gauze with vinegar solution and apply to wound for 5 mintues  2. Remove wet gauze  3. Perform daily       DILLON Bullock M.D. March 10, 2022      Call us at [phone] if you have any questions about your wounds, have redness or swelling around your wound, have a fever of 101 or greater or if you have any other problems or concerns. We answer the phone Monday through Friday 8 am to 4 pm, please leave a message as we check the voicemail frequently throughout the day.     If you had a positive experience  please indicate that on your patient satisfaction survey form that [facility] will be sending you.    It was a pleasure meeting with you today.  Thank you for allowing me and my team the privilege of caring for you today.  YOU are the reason we are here, and I truly hope we provided you with the excellent service you deserve.  Please let us know if there is anything else we can do for you so that we can be sure you are leaving completely satisfied with your care experience.      If you have any billing related questions please call the [facility] Business office at [phone]. The clinic staff does not handle billing related matters.

## 2022-03-10 NOTE — PROGRESS NOTES
Patient arrived for wound care visit. Certified Wound Care Nurse time spent evaluating patient record, completed a full evaluation and documented wound(s) & devno-wound skin; provided recommendation based on treatment plan. Applied dressing, reviewed discharge instructions, patient education, and discussed plan of care with appropriate medical team staff members and patient and/or family members.

## 2022-03-11 DIAGNOSIS — Z11.59 ENCOUNTER FOR SCREENING FOR OTHER VIRAL DISEASES: Primary | ICD-10-CM

## 2022-03-17 ENCOUNTER — LAB REQUISITION (OUTPATIENT)
Dept: LAB | Facility: CLINIC | Age: 77
End: 2022-03-17
Payer: COMMERCIAL

## 2022-03-17 ENCOUNTER — TRANSFERRED RECORDS (OUTPATIENT)
Dept: HEALTH INFORMATION MANAGEMENT | Facility: CLINIC | Age: 77
End: 2022-03-17
Payer: COMMERCIAL

## 2022-03-17 DIAGNOSIS — Z01.818 ENCOUNTER FOR OTHER PREPROCEDURAL EXAMINATION: ICD-10-CM

## 2022-03-17 LAB
ANION GAP SERPL CALCULATED.3IONS-SCNC: 13 MMOL/L (ref 5–18)
BUN SERPL-MCNC: 22 MG/DL (ref 8–28)
CALCIUM SERPL-MCNC: 9.2 MG/DL (ref 8.5–10.5)
CHLORIDE BLD-SCNC: 105 MMOL/L (ref 98–107)
CO2 SERPL-SCNC: 25 MMOL/L (ref 22–31)
CREAT SERPL-MCNC: 1.03 MG/DL (ref 0.6–1.1)
GFR SERPL CREATININE-BSD FRML MDRD: 57 ML/MIN/1.73M2
GLUCOSE BLD-MCNC: 87 MG/DL (ref 70–125)
POTASSIUM BLD-SCNC: 4.6 MMOL/L (ref 3.5–5)
SODIUM SERPL-SCNC: 143 MMOL/L (ref 136–145)

## 2022-03-17 PROCEDURE — 80048 BASIC METABOLIC PNL TOTAL CA: CPT | Mod: ORL | Performed by: PHYSICIAN ASSISTANT

## 2022-03-18 ENCOUNTER — LAB (OUTPATIENT)
Dept: LAB | Facility: CLINIC | Age: 77
End: 2022-03-18
Attending: SURGERY
Payer: COMMERCIAL

## 2022-03-18 DIAGNOSIS — Z11.59 ENCOUNTER FOR SCREENING FOR OTHER VIRAL DISEASES: ICD-10-CM

## 2022-03-18 PROCEDURE — U0005 INFEC AGEN DETEC AMPLI PROBE: HCPCS

## 2022-03-18 PROCEDURE — U0003 INFECTIOUS AGENT DETECTION BY NUCLEIC ACID (DNA OR RNA); SEVERE ACUTE RESPIRATORY SYNDROME CORONAVIRUS 2 (SARS-COV-2) (CORONAVIRUS DISEASE [COVID-19]), AMPLIFIED PROBE TECHNIQUE, MAKING USE OF HIGH THROUGHPUT TECHNOLOGIES AS DESCRIBED BY CMS-2020-01-R: HCPCS

## 2022-03-18 RX ORDER — ROPINIROLE 1 MG/1
0.5 TABLET, FILM COATED ORAL 2 TIMES DAILY
Status: ON HOLD | COMMUNITY
End: 2022-03-29

## 2022-03-18 RX ORDER — POTASSIUM CHLORIDE 1500 MG/1
40 TABLET, EXTENDED RELEASE ORAL EVERY MORNING
Status: ON HOLD | COMMUNITY
End: 2022-03-29

## 2022-03-18 NOTE — PROGRESS NOTES
PTA medications updated by Medication Scribe prior to surgery via phone call with patient (last doses completed by Nurse)     Medication history sources: Patient, Surescripts and H&P  In the past week, patient estimated taking medication this percent of the time: Greater than 90%  Adherence assessment: N/A Not Observed    Significant changes made to the medication list:  Patient taking meds differently than prescribed; See PTA entries for: ROPINIROLE, & POTASSIUM      Additional medication history information:   None    Medication reconciliation completed by provider prior to medication history? No    Time spent in this activity: 45 MINUTES    The information provided in this note is only as accurate as the sources available at the time of update(s)      Prior to Admission medications    Medication Sig Last Dose Taking? Auth Provider   aspirin 81 mg chewable tablet Take 81 mg by mouth every evening   at PM Yes Provider, Historical   Bioflavonoid Products (CINDY-C) 500-550 MG TABS Take 1 tablet by mouth 2 times daily  at PM Yes Gabriele Bullock MD   buPROPion (WELLBUTRIN SR) 150 MG 12 hr tablet Take 150 mg by mouth every morning   at AM Yes Provider, Historical   citalopram (CELEXA) 40 MG tablet Take 40 mg by mouth every morning   at AM Yes Provider, Historical   cyanocobalamin (VITAMIN B-12) 1000 MCG tablet Take 1 tablet (1,000 mcg) by mouth daily  at AM Yes Gabriele Bullock MD   furosemide (LASIX) 20 MG tablet Take 20 mg by mouth 2 times daily AM and afternoon (4PM)  at AM Yes Provider, Historical   HYDROcodone-acetaminophen (NORCO) 5-325 MG tablet TAKE 1 TABLET BY MOUTH EVERY 8 HOURS AS NEEDED FOR 4 DAYS  at PRN Yes Reported, Patient   multivitamin w/minerals (CENTRUM ADULTS) tablet Take 1 tablet by mouth daily   at AM Yes Reported, Patient   naproxen sodium (ALEVE) 220 MG tablet Take 440 mg by mouth daily as needed (220MG X 2 = 440MG)  at PRN Yes Reported, Patient   Nutritional Supplements (VARICOSE VEINS  "FORMULA OR) Take 1 tablet by mouth every morning  at AM Yes Reported, Patient   pantoprazole (PROTONIX) 40 MG tablet [PANTOPRAZOLE (PROTONIX) 40 MG TABLET] Take 40 mg by mouth daily.  at AM Yes Provider, Historical   potassium chloride ER (KLOR-CON M) 20 MEQ CR tablet Take 20 mEq by mouth 2 times daily IN THE MORNING AND NOON  (TAKE IN ADDITION TO PM DOSE FOR TOTAL 30mEq DAILY)  at 1200 Yes Reported, Patient   potassium chloride ER (KLOR-CON M) 20 MEQ CR tablet Take 20 mEq by mouth every evening (TAKE IN ADDITION TO AM/NOON DOSE FOR TOTAL 30mEq DAILY)  at PM Yes Unknown, Entered By History   pregabalin (LYRICA) 100 MG capsule Take 100 mg by mouth 2 times daily   at PM Yes Reported, Patient   rOPINIRole (REQUIP) 1 MG tablet Take 0.5 mg by mouth 2 times daily IN THE MORNING AND AT NOON  (1MG X 0.5 = 0.5MG)  (TAKE IN ADDITION TO PM DOSE FOR TOTAL 2MG DAILY)  at 1200 Yes Reported, Patient   rOPINIRole (REQUIP) 1 MG tablet Take 1 mg by mouth At Bedtime (TAKE IN ADDITION TO AM/NOON DOSE FOR TOTAL 2MG DAILY)  at PM Yes Unknown, Entered By History   simvastatin (ZOCOR) 40 MG tablet [SIMVASTATIN (ZOCOR) 40 MG TABLET] Take 40 mg by mouth bedtime.  at PM Yes Provider, Historical   Skin Protectants, Misc. (INTERDRY 10\"X36\") SHEE Externally apply 7 Units topically once as needed (change daily in groin rash/fold)  Yes Kathe Vargas MD   spironolactone (ALDACTONE) 25 MG tablet [SPIRONOLACTONE (ALDACTONE) 25 MG TABLET] Take 25 mg by mouth daily.  at AM Yes Provider, Historical   vitamin B-Complex Take 1 tablet by mouth daily  at AM Yes Gabriele Bullock MD   vitamin D3 (CHOLECALCIFEROL) 250 mcg (11841 units) capsule Take 1 capsule (250 mcg) by mouth daily  at AM Yes Gabriele Bullock MD       "

## 2022-03-19 LAB — SARS-COV-2 RNA RESP QL NAA+PROBE: NEGATIVE

## 2022-03-20 ENCOUNTER — ANESTHESIA EVENT (OUTPATIENT)
Dept: SURGERY | Facility: CLINIC | Age: 77
DRG: 264 | End: 2022-03-20
Payer: COMMERCIAL

## 2022-03-21 ENCOUNTER — APPOINTMENT (OUTPATIENT)
Dept: SURGERY | Facility: PHYSICIAN GROUP | Age: 77
End: 2022-03-21
Payer: COMMERCIAL

## 2022-03-21 ENCOUNTER — ANESTHESIA (OUTPATIENT)
Dept: SURGERY | Facility: CLINIC | Age: 77
DRG: 264 | End: 2022-03-21
Payer: COMMERCIAL

## 2022-03-21 ENCOUNTER — HOSPITAL ENCOUNTER (INPATIENT)
Facility: CLINIC | Age: 77
LOS: 38 days | Discharge: NURSING, REHAB OR LONG-TERM CARE | DRG: 264 | End: 2022-04-29
Attending: SURGERY | Admitting: SURGERY
Payer: COMMERCIAL

## 2022-03-21 DIAGNOSIS — R52 PAIN ASSOCIATED WITH WOUND: ICD-10-CM

## 2022-03-21 DIAGNOSIS — L97.912 ULCER OF RIGHT LOWER EXTREMITY WITH FAT LAYER EXPOSED (H): Primary | ICD-10-CM

## 2022-03-21 DIAGNOSIS — T14.8XXA PAIN ASSOCIATED WITH WOUND: ICD-10-CM

## 2022-03-21 DIAGNOSIS — L03.115 CELLULITIS OF RIGHT LOWER EXTREMITY: ICD-10-CM

## 2022-03-21 DIAGNOSIS — G89.29 OTHER CHRONIC PAIN: ICD-10-CM

## 2022-03-21 DIAGNOSIS — I48.0 PAROXYSMAL ATRIAL FIBRILLATION (H): ICD-10-CM

## 2022-03-21 DIAGNOSIS — G25.81 RESTLESS LEGS: ICD-10-CM

## 2022-03-21 LAB
LACTATE SERPL-SCNC: 1 MMOL/L (ref 0.7–2)
POTASSIUM BLD-SCNC: 4.2 MMOL/L (ref 3.4–5.3)

## 2022-03-21 PROCEDURE — 83605 ASSAY OF LACTIC ACID: CPT | Performed by: SURGERY

## 2022-03-21 PROCEDURE — 11042 DBRDMT SUBQ TIS 1ST 20SQCM/<: CPT | Performed by: SURGERY

## 2022-03-21 PROCEDURE — 250N000013 HC RX MED GY IP 250 OP 250 PS 637: Performed by: SURGERY

## 2022-03-21 PROCEDURE — 250N000011 HC RX IP 250 OP 636: Performed by: SURGERY

## 2022-03-21 PROCEDURE — 11045 DBRDMT SUBQ TISS EACH ADDL: CPT | Performed by: SURGERY

## 2022-03-21 PROCEDURE — 710N000009 HC RECOVERY PHASE 1, LEVEL 1, PER MIN: Performed by: SURGERY

## 2022-03-21 PROCEDURE — 250N000011 HC RX IP 250 OP 636: Performed by: ANESTHESIOLOGY

## 2022-03-21 PROCEDURE — 250N000011 HC RX IP 250 OP 636: Performed by: NURSE ANESTHETIST, CERTIFIED REGISTERED

## 2022-03-21 PROCEDURE — 999N000141 HC STATISTIC PRE-PROCEDURE NURSING ASSESSMENT: Performed by: SURGERY

## 2022-03-21 PROCEDURE — 360N000075 HC SURGERY LEVEL 2, PER MIN: Performed by: SURGERY

## 2022-03-21 PROCEDURE — 36415 COLL VENOUS BLD VENIPUNCTURE: CPT | Performed by: ANESTHESIOLOGY

## 2022-03-21 PROCEDURE — 258N000003 HC RX IP 258 OP 636: Performed by: NURSE ANESTHETIST, CERTIFIED REGISTERED

## 2022-03-21 PROCEDURE — 99207 PR NO CHARGE LOS: CPT | Performed by: PHYSICIAN ASSISTANT

## 2022-03-21 PROCEDURE — 370N000017 HC ANESTHESIA TECHNICAL FEE, PER MIN: Performed by: SURGERY

## 2022-03-21 PROCEDURE — 250N000025 HC SEVOFLURANE, PER MIN: Performed by: SURGERY

## 2022-03-21 PROCEDURE — 250N000009 HC RX 250: Performed by: SURGERY

## 2022-03-21 PROCEDURE — 36415 COLL VENOUS BLD VENIPUNCTURE: CPT | Performed by: SURGERY

## 2022-03-21 PROCEDURE — 0JBN0ZZ EXCISION OF RIGHT LOWER LEG SUBCUTANEOUS TISSUE AND FASCIA, OPEN APPROACH: ICD-10-PCS | Performed by: SURGERY

## 2022-03-21 PROCEDURE — 272N000001 HC OR GENERAL SUPPLY STERILE: Performed by: SURGERY

## 2022-03-21 PROCEDURE — 250N000009 HC RX 250: Performed by: NURSE ANESTHETIST, CERTIFIED REGISTERED

## 2022-03-21 PROCEDURE — 84132 ASSAY OF SERUM POTASSIUM: CPT | Performed by: ANESTHESIOLOGY

## 2022-03-21 RX ORDER — POTASSIUM CHLORIDE 1500 MG/1
40 TABLET, EXTENDED RELEASE ORAL EVERY MORNING
Status: DISCONTINUED | OUTPATIENT
Start: 2022-03-22 | End: 2022-04-29 | Stop reason: HOSPADM

## 2022-03-21 RX ORDER — HYDROCODONE BITARTRATE AND ACETAMINOPHEN 5; 325 MG/1; MG/1
1-2 TABLET ORAL EVERY 4 HOURS PRN
Status: DISCONTINUED | OUTPATIENT
Start: 2022-03-21 | End: 2022-03-21

## 2022-03-21 RX ORDER — SPIRONOLACTONE 25 MG/1
25 TABLET ORAL DAILY
Status: DISCONTINUED | OUTPATIENT
Start: 2022-03-21 | End: 2022-04-29 | Stop reason: HOSPADM

## 2022-03-21 RX ORDER — POTASSIUM CHLORIDE 1500 MG/1
20 TABLET, EXTENDED RELEASE ORAL EVERY EVENING
Status: DISCONTINUED | OUTPATIENT
Start: 2022-03-21 | End: 2022-04-29 | Stop reason: HOSPADM

## 2022-03-21 RX ORDER — PROPOFOL 10 MG/ML
INJECTION, EMULSION INTRAVENOUS PRN
Status: DISCONTINUED | OUTPATIENT
Start: 2022-03-21 | End: 2022-03-21

## 2022-03-21 RX ORDER — ARGININE/GLUTAMINE/CALCIUM BMB 7G-7G-1.5G
1 POWDER IN PACKET (EA) ORAL 2 TIMES DAILY
Status: DISCONTINUED | OUTPATIENT
Start: 2022-03-21 | End: 2022-04-29 | Stop reason: HOSPADM

## 2022-03-21 RX ORDER — CEFAZOLIN SODIUM IN 0.9 % NACL 3 G/100 ML
3 INTRAVENOUS SOLUTION, PIGGYBACK (ML) INTRAVENOUS SEE ADMIN INSTRUCTIONS
Status: DISCONTINUED | OUTPATIENT
Start: 2022-03-21 | End: 2022-03-21

## 2022-03-21 RX ORDER — NAPROXEN 250 MG/1
500 TABLET ORAL 2 TIMES DAILY WITH MEALS
Status: DISCONTINUED | OUTPATIENT
Start: 2022-03-27 | End: 2022-03-24

## 2022-03-21 RX ORDER — MULTIPLE VITAMINS W/ MINERALS TAB 9MG-400MCG
1 TAB ORAL DAILY
Status: DISCONTINUED | OUTPATIENT
Start: 2022-03-22 | End: 2022-04-29 | Stop reason: HOSPADM

## 2022-03-21 RX ORDER — SODIUM CHLORIDE, SODIUM LACTATE, POTASSIUM CHLORIDE, CALCIUM CHLORIDE 600; 310; 30; 20 MG/100ML; MG/100ML; MG/100ML; MG/100ML
INJECTION, SOLUTION INTRAVENOUS CONTINUOUS
Status: DISCONTINUED | OUTPATIENT
Start: 2022-03-21 | End: 2022-03-21 | Stop reason: HOSPADM

## 2022-03-21 RX ORDER — ONDANSETRON 4 MG/1
4 TABLET, ORALLY DISINTEGRATING ORAL EVERY 30 MIN PRN
Status: DISCONTINUED | OUTPATIENT
Start: 2022-03-21 | End: 2022-03-21 | Stop reason: HOSPADM

## 2022-03-21 RX ORDER — FUROSEMIDE 20 MG/1
10 TABLET ORAL 2 TIMES DAILY
Status: DISCONTINUED | OUTPATIENT
Start: 2022-03-21 | End: 2022-03-24

## 2022-03-21 RX ORDER — SODIUM CHLORIDE, SODIUM LACTATE, POTASSIUM CHLORIDE, CALCIUM CHLORIDE 600; 310; 30; 20 MG/100ML; MG/100ML; MG/100ML; MG/100ML
INJECTION, SOLUTION INTRAVENOUS CONTINUOUS PRN
Status: DISCONTINUED | OUTPATIENT
Start: 2022-03-21 | End: 2022-03-21

## 2022-03-21 RX ORDER — SIMVASTATIN 40 MG
40 TABLET ORAL AT BEDTIME
Status: DISCONTINUED | OUTPATIENT
Start: 2022-03-21 | End: 2022-04-29 | Stop reason: HOSPADM

## 2022-03-21 RX ORDER — ONDANSETRON 2 MG/ML
INJECTION INTRAMUSCULAR; INTRAVENOUS PRN
Status: DISCONTINUED | OUTPATIENT
Start: 2022-03-21 | End: 2022-03-21

## 2022-03-21 RX ORDER — ROPINIROLE 0.5 MG/1
0.5 TABLET, FILM COATED ORAL 2 TIMES DAILY
Status: DISCONTINUED | OUTPATIENT
Start: 2022-03-21 | End: 2022-04-29 | Stop reason: HOSPADM

## 2022-03-21 RX ORDER — CEFAZOLIN SODIUM IN 0.9 % NACL 3 G/100 ML
3 INTRAVENOUS SOLUTION, PIGGYBACK (ML) INTRAVENOUS
Status: DISCONTINUED | OUTPATIENT
Start: 2022-03-21 | End: 2022-03-21

## 2022-03-21 RX ORDER — LANOLIN ALCOHOL/MO/W.PET/CERES
1000 CREAM (GRAM) TOPICAL DAILY
Status: DISCONTINUED | OUTPATIENT
Start: 2022-03-22 | End: 2022-04-29 | Stop reason: HOSPADM

## 2022-03-21 RX ORDER — NALOXONE HYDROCHLORIDE 0.4 MG/ML
0.4 INJECTION, SOLUTION INTRAMUSCULAR; INTRAVENOUS; SUBCUTANEOUS
Status: DISCONTINUED | OUTPATIENT
Start: 2022-03-21 | End: 2022-04-29 | Stop reason: HOSPADM

## 2022-03-21 RX ORDER — CEFAZOLIN SODIUM IN 0.9 % NACL 3 G/100 ML
3 INTRAVENOUS SOLUTION, PIGGYBACK (ML) INTRAVENOUS
Status: COMPLETED | OUTPATIENT
Start: 2022-03-21 | End: 2022-03-21

## 2022-03-21 RX ORDER — PANTOPRAZOLE SODIUM 40 MG/1
40 TABLET, DELAYED RELEASE ORAL DAILY
Status: DISCONTINUED | OUTPATIENT
Start: 2022-03-21 | End: 2022-04-06

## 2022-03-21 RX ORDER — ONDANSETRON 2 MG/ML
4 INJECTION INTRAMUSCULAR; INTRAVENOUS EVERY 30 MIN PRN
Status: DISCONTINUED | OUTPATIENT
Start: 2022-03-21 | End: 2022-03-21 | Stop reason: HOSPADM

## 2022-03-21 RX ORDER — KETOROLAC TROMETHAMINE 15 MG/ML
15 INJECTION, SOLUTION INTRAMUSCULAR; INTRAVENOUS EVERY 6 HOURS PRN
Status: DISCONTINUED | OUTPATIENT
Start: 2022-03-21 | End: 2022-03-23

## 2022-03-21 RX ORDER — HYDROMORPHONE HYDROCHLORIDE 2 MG/1
2 TABLET ORAL
Status: DISCONTINUED | OUTPATIENT
Start: 2022-03-21 | End: 2022-03-28

## 2022-03-21 RX ORDER — ROPINIROLE 1 MG/1
1 TABLET, FILM COATED ORAL AT BEDTIME
Status: DISCONTINUED | OUTPATIENT
Start: 2022-03-21 | End: 2022-04-29 | Stop reason: HOSPADM

## 2022-03-21 RX ORDER — LIDOCAINE 40 MG/G
CREAM TOPICAL
Status: DISCONTINUED | OUTPATIENT
Start: 2022-03-21 | End: 2022-03-23

## 2022-03-21 RX ORDER — CEFAZOLIN SODIUM 2 G/100ML
2 INJECTION, SOLUTION INTRAVENOUS EVERY 8 HOURS
Status: COMPLETED | OUTPATIENT
Start: 2022-03-21 | End: 2022-03-22

## 2022-03-21 RX ORDER — NALOXONE HYDROCHLORIDE 0.4 MG/ML
0.2 INJECTION, SOLUTION INTRAMUSCULAR; INTRAVENOUS; SUBCUTANEOUS
Status: DISCONTINUED | OUTPATIENT
Start: 2022-03-21 | End: 2022-04-29 | Stop reason: HOSPADM

## 2022-03-21 RX ORDER — ASPIRIN 81 MG/1
81 TABLET, CHEWABLE ORAL EVERY EVENING
Status: DISCONTINUED | OUTPATIENT
Start: 2022-03-21 | End: 2022-03-31

## 2022-03-21 RX ORDER — CEFAZOLIN SODIUM IN 0.9 % NACL 3 G/100 ML
3 INTRAVENOUS SOLUTION, PIGGYBACK (ML) INTRAVENOUS SEE ADMIN INSTRUCTIONS
Status: DISCONTINUED | OUTPATIENT
Start: 2022-03-21 | End: 2022-03-21 | Stop reason: HOSPADM

## 2022-03-21 RX ORDER — MAGNESIUM HYDROXIDE 1200 MG/15ML
LIQUID ORAL PRN
Status: DISCONTINUED | OUTPATIENT
Start: 2022-03-21 | End: 2022-03-21 | Stop reason: HOSPADM

## 2022-03-21 RX ORDER — FENTANYL CITRATE 50 UG/ML
INJECTION, SOLUTION INTRAMUSCULAR; INTRAVENOUS PRN
Status: DISCONTINUED | OUTPATIENT
Start: 2022-03-21 | End: 2022-03-21

## 2022-03-21 RX ORDER — LIDOCAINE HYDROCHLORIDE 20 MG/ML
INJECTION, SOLUTION INFILTRATION; PERINEURAL PRN
Status: DISCONTINUED | OUTPATIENT
Start: 2022-03-21 | End: 2022-03-21

## 2022-03-21 RX ORDER — LIDOCAINE HCL/EPINEPHRINE/PF 2%-1:200K
VIAL (ML) INJECTION
Status: DISCONTINUED
Start: 2022-03-21 | End: 2022-03-21 | Stop reason: WASHOUT

## 2022-03-21 RX ORDER — HYDROMORPHONE HCL IN WATER/PF 6 MG/30 ML
0.2 PATIENT CONTROLLED ANALGESIA SYRINGE INTRAVENOUS
Status: DISCONTINUED | OUTPATIENT
Start: 2022-03-21 | End: 2022-03-28

## 2022-03-21 RX ORDER — PREGABALIN 100 MG/1
100 CAPSULE ORAL 2 TIMES DAILY
Status: DISCONTINUED | OUTPATIENT
Start: 2022-03-21 | End: 2022-04-02

## 2022-03-21 RX ORDER — DEXAMETHASONE SODIUM PHOSPHATE 4 MG/ML
INJECTION, SOLUTION INTRA-ARTICULAR; INTRALESIONAL; INTRAMUSCULAR; INTRAVENOUS; SOFT TISSUE PRN
Status: DISCONTINUED | OUTPATIENT
Start: 2022-03-21 | End: 2022-03-21

## 2022-03-21 RX ORDER — CITALOPRAM HYDROBROMIDE 20 MG/1
40 TABLET ORAL EVERY MORNING
Status: DISCONTINUED | OUTPATIENT
Start: 2022-03-22 | End: 2022-04-29 | Stop reason: HOSPADM

## 2022-03-21 RX ORDER — BUPROPION HYDROCHLORIDE 150 MG/1
150 TABLET, EXTENDED RELEASE ORAL EVERY MORNING
Status: DISCONTINUED | OUTPATIENT
Start: 2022-03-22 | End: 2022-04-29 | Stop reason: HOSPADM

## 2022-03-21 RX ORDER — HYDROXYZINE HYDROCHLORIDE 50 MG/ML
25 INJECTION, SOLUTION INTRAMUSCULAR ONCE
Status: COMPLETED | OUTPATIENT
Start: 2022-03-21 | End: 2022-03-21

## 2022-03-21 RX ORDER — DEXMEDETOMIDINE HYDROCHLORIDE 4 UG/ML
INJECTION, SOLUTION INTRAVENOUS PRN
Status: DISCONTINUED | OUTPATIENT
Start: 2022-03-21 | End: 2022-03-21

## 2022-03-21 RX ORDER — FENTANYL CITRATE 50 UG/ML
25 INJECTION, SOLUTION INTRAMUSCULAR; INTRAVENOUS EVERY 5 MIN PRN
Status: DISCONTINUED | OUTPATIENT
Start: 2022-03-21 | End: 2022-03-21 | Stop reason: HOSPADM

## 2022-03-21 RX ORDER — HYDROMORPHONE HCL IN WATER/PF 6 MG/30 ML
0.2 PATIENT CONTROLLED ANALGESIA SYRINGE INTRAVENOUS EVERY 5 MIN PRN
Status: DISCONTINUED | OUTPATIENT
Start: 2022-03-21 | End: 2022-03-21 | Stop reason: HOSPADM

## 2022-03-21 RX ADMIN — FUROSEMIDE 10 MG: 20 TABLET ORAL at 16:57

## 2022-03-21 RX ADMIN — FENTANYL CITRATE 50 MCG: 50 INJECTION, SOLUTION INTRAMUSCULAR; INTRAVENOUS at 10:31

## 2022-03-21 RX ADMIN — FENTANYL CITRATE 50 MCG: 50 INJECTION, SOLUTION INTRAMUSCULAR; INTRAVENOUS at 11:48

## 2022-03-21 RX ADMIN — ONDANSETRON 4 MG: 2 INJECTION INTRAMUSCULAR; INTRAVENOUS at 11:10

## 2022-03-21 RX ADMIN — PROPOFOL 20 MG: 10 INJECTION, EMULSION INTRAVENOUS at 10:24

## 2022-03-21 RX ADMIN — FENTANYL CITRATE 50 MCG: 50 INJECTION, SOLUTION INTRAMUSCULAR; INTRAVENOUS at 11:44

## 2022-03-21 RX ADMIN — Medication 250 MCG: at 16:56

## 2022-03-21 RX ADMIN — MIDAZOLAM 2 MG: 1 INJECTION INTRAMUSCULAR; INTRAVENOUS at 09:54

## 2022-03-21 RX ADMIN — DEXMEDETOMIDINE HYDROCHLORIDE 4 MCG: 200 INJECTION INTRAVENOUS at 11:58

## 2022-03-21 RX ADMIN — HYDROMORPHONE HYDROCHLORIDE 2 MG: 2 TABLET ORAL at 23:02

## 2022-03-21 RX ADMIN — HYDROMORPHONE HYDROCHLORIDE 0.2 MG: 0.2 INJECTION, SOLUTION INTRAMUSCULAR; INTRAVENOUS; SUBCUTANEOUS at 13:08

## 2022-03-21 RX ADMIN — HYDROXYZINE HYDROCHLORIDE 25 MG: 50 INJECTION, SOLUTION INTRAMUSCULAR at 13:01

## 2022-03-21 RX ADMIN — FENTANYL CITRATE 25 MCG: 0.05 INJECTION, SOLUTION INTRAMUSCULAR; INTRAVENOUS at 12:29

## 2022-03-21 RX ADMIN — PANTOPRAZOLE SODIUM 40 MG: 40 TABLET, DELAYED RELEASE ORAL at 16:57

## 2022-03-21 RX ADMIN — FENTANYL CITRATE 50 MCG: 50 INJECTION, SOLUTION INTRAMUSCULAR; INTRAVENOUS at 12:03

## 2022-03-21 RX ADMIN — DEXMEDETOMIDINE HYDROCHLORIDE 8 MCG: 200 INJECTION INTRAVENOUS at 11:56

## 2022-03-21 RX ADMIN — FENTANYL CITRATE 25 MCG: 50 INJECTION, SOLUTION INTRAMUSCULAR; INTRAVENOUS at 11:36

## 2022-03-21 RX ADMIN — CEFAZOLIN SODIUM 2 G: 2 INJECTION, SOLUTION INTRAVENOUS at 17:46

## 2022-03-21 RX ADMIN — Medication 3 G: at 09:48

## 2022-03-21 RX ADMIN — SIMVASTATIN 40 MG: 40 TABLET, FILM COATED ORAL at 21:27

## 2022-03-21 RX ADMIN — ROPINIROLE HYDROCHLORIDE 0.5 MG: 0.5 TABLET, FILM COATED ORAL at 17:54

## 2022-03-21 RX ADMIN — HYDROMORPHONE HYDROCHLORIDE 2 MG: 2 TABLET ORAL at 19:57

## 2022-03-21 RX ADMIN — Medication 1 CAPSULE: at 21:28

## 2022-03-21 RX ADMIN — SPIRONOLACTONE 25 MG: 25 TABLET ORAL at 16:56

## 2022-03-21 RX ADMIN — DEXAMETHASONE SODIUM PHOSPHATE 4 MG: 4 INJECTION, SOLUTION INTRA-ARTICULAR; INTRALESIONAL; INTRAMUSCULAR; INTRAVENOUS; SOFT TISSUE at 10:12

## 2022-03-21 RX ADMIN — HYDROMORPHONE HYDROCHLORIDE 0.5 MG: 1 INJECTION, SOLUTION INTRAMUSCULAR; INTRAVENOUS; SUBCUTANEOUS at 11:53

## 2022-03-21 RX ADMIN — HYDROMORPHONE HYDROCHLORIDE 0.2 MG: 0.2 INJECTION, SOLUTION INTRAMUSCULAR; INTRAVENOUS; SUBCUTANEOUS at 13:15

## 2022-03-21 RX ADMIN — FENTANYL CITRATE 25 MCG: 50 INJECTION, SOLUTION INTRAMUSCULAR; INTRAVENOUS at 11:41

## 2022-03-21 RX ADMIN — FENTANYL CITRATE 50 MCG: 50 INJECTION, SOLUTION INTRAMUSCULAR; INTRAVENOUS at 12:08

## 2022-03-21 RX ADMIN — ROPINIROLE HYDROCHLORIDE 1 MG: 0.5 TABLET, FILM COATED ORAL at 21:27

## 2022-03-21 RX ADMIN — FENTANYL CITRATE 50 MCG: 50 INJECTION, SOLUTION INTRAMUSCULAR; INTRAVENOUS at 10:24

## 2022-03-21 RX ADMIN — PREGABALIN 100 MG: 100 CAPSULE ORAL at 21:27

## 2022-03-21 RX ADMIN — KETOROLAC TROMETHAMINE 15 MG: 15 INJECTION, SOLUTION INTRAMUSCULAR; INTRAVENOUS at 16:53

## 2022-03-21 RX ADMIN — LIDOCAINE HYDROCHLORIDE 60 MG: 20 INJECTION, SOLUTION INFILTRATION; PERINEURAL at 09:56

## 2022-03-21 RX ADMIN — FENTANYL CITRATE 25 MCG: 0.05 INJECTION, SOLUTION INTRAMUSCULAR; INTRAVENOUS at 12:40

## 2022-03-21 RX ADMIN — ROCURONIUM BROMIDE 30 MG: 50 INJECTION, SOLUTION INTRAVENOUS at 09:56

## 2022-03-21 RX ADMIN — PROPOFOL 180 MG: 10 INJECTION, EMULSION INTRAVENOUS at 09:56

## 2022-03-21 RX ADMIN — KETOROLAC TROMETHAMINE 15 MG: 15 INJECTION, SOLUTION INTRAMUSCULAR; INTRAVENOUS at 23:02

## 2022-03-21 RX ADMIN — ASPIRIN 81 MG CHEWABLE TABLET 81 MG: 81 TABLET CHEWABLE at 19:57

## 2022-03-21 RX ADMIN — PHENYLEPHRINE HYDROCHLORIDE 100 MCG: 10 INJECTION INTRAVENOUS at 09:57

## 2022-03-21 RX ADMIN — SODIUM CHLORIDE, POTASSIUM CHLORIDE, SODIUM LACTATE AND CALCIUM CHLORIDE: 600; 310; 30; 20 INJECTION, SOLUTION INTRAVENOUS at 09:04

## 2022-03-21 RX ADMIN — FENTANYL CITRATE 25 MCG: 0.05 INJECTION, SOLUTION INTRAMUSCULAR; INTRAVENOUS at 12:45

## 2022-03-21 RX ADMIN — POTASSIUM CHLORIDE 20 MEQ: 1500 TABLET, EXTENDED RELEASE ORAL at 19:57

## 2022-03-21 RX ADMIN — FENTANYL CITRATE 25 MCG: 0.05 INJECTION, SOLUTION INTRAMUSCULAR; INTRAVENOUS at 12:35

## 2022-03-21 RX ADMIN — DEXMEDETOMIDINE HYDROCHLORIDE 8 MCG: 200 INJECTION INTRAVENOUS at 11:54

## 2022-03-21 RX ADMIN — FENTANYL CITRATE 50 MCG: 50 INJECTION, SOLUTION INTRAMUSCULAR; INTRAVENOUS at 09:56

## 2022-03-21 RX ADMIN — B-COMPLEX W/ C & FOLIC ACID TAB 1 TABLET: TAB at 16:57

## 2022-03-21 RX ADMIN — HYDROMORPHONE HYDROCHLORIDE 2 MG: 2 TABLET ORAL at 16:13

## 2022-03-21 ASSESSMENT — COPD QUESTIONNAIRES: COPD: 0

## 2022-03-21 NOTE — ANESTHESIA CARE TRANSFER NOTE
Patient: Elizabeth Kelley    Procedure: Procedure(s):  RIGHT LEG WOUND DEBRIDEMENT, PAULIE DERMATONE, MISONIX, VAC VERA FLOW WITH VASHE       Diagnosis: Chronic ulcer of right leg, with fat layer exposed (H) [L97.912]  Diagnosis Additional Information: No value filed.    Anesthesia Type:   General     Note:    Oropharynx: oropharynx clear of all foreign objects and spontaneously breathing  Level of Consciousness: awake  Oxygen Supplementation: face mask  Level of Supplemental Oxygen (L/min / FiO2): 6  Independent Airway: airway patency satisfactory and stable  Dentition: dentition unchanged  Vital Signs Stable: post-procedure vital signs reviewed and stable  Report to RN Given: handoff report given  Patient transferred to: PACU  Comments: Pt to PACU with O2 via mask, airway patent, VSS. Report to RN.  Handoff Report: Identifed the Patient, Identified the Reponsible Provider, Reviewed the pertinent medical history, Discussed the surgical course, Reviewed Intra-OP anesthesia mangement and issues during anesthesia, Set expectations for post-procedure period and Allowed opportunity for questions and acknowledgement of understanding      Vitals:  Vitals Value Taken Time   /92 03/21/22 1205   Temp     Pulse 96 03/21/22 1208   Resp 7 03/21/22 1208   SpO2 99 % 03/21/22 1208   Vitals shown include unvalidated device data.    Electronically Signed By: LAXMI Abarca CRNA  March 21, 2022  12:10 PM

## 2022-03-21 NOTE — PLAN OF CARE
AO x4.  VSS on 2 liters oxygen via nasal cannula.  Tearful upon arrival to unit due to intense pain.  POD 0 RLE excisional debridement and wound vac placement.  Brownish output via wound vac.  Significant pain in right leg controlled with PO dilaudid and toradol thus far.  Bedrest.  Wedges beneath bilateral legs.  Capnography.  LLE PCD on.  Tolerating full liquid diet.  Due to void.  Plan discussed with pt and her daughter.  Nursing will continue to monitor.

## 2022-03-21 NOTE — INTERVAL H&P NOTE
"I have reviewed the surgical (or preoperative) H&P that is linked to this encounter, and examined the patient. There are no significant changes    Clinical Conditions Present on Arrival:  SECTIONPRESENTONADMISSIONBEGIN@                 # Platelet Defect: home medication list includes an antiplatelet medication   # Severe Obesity: Estimated body mass index is 50.81 kg/m  as calculated from the following:    Height as of this encounter: 1.549 m (5' 1\").    Weight as of this encounter: 122 kg (268 lb 14.4 oz).       "

## 2022-03-21 NOTE — PHARMACY
"The following home medications were NOT continued on inpatient admission per \"Discontinuation of nonessential home medications during hospitalization\" policy: Bioflavonoid Products (CINDY-C) 500-550 MG TABS    If a therapeutic holiday is deemed inappropriate per the prescriber, please notify the pharmacist regarding the medication order.    The pharmacist is available to answer any questions and/or concerns the patient may have regarding discontinuation of non-essential medications.    Please ensure that these medications are restarted as needed upon discharge via the medication reconciliation discharge process and included on the discharge medication reconciliation report.    Thank you,  Akil Aliheyder, [facility]    "

## 2022-03-21 NOTE — ANESTHESIA PREPROCEDURE EVALUATION
Anesthesia Pre-Procedure Evaluation    Patient: Elizabeth Kelley   MRN: [id number] : 1945        Procedure : Procedure(s):  RIGHT LEG WOUND DEBRIDEMENT, PAULIE DERMATONE, MISONIX, VAC VERA FLOW WITH VASHE          Past Medical History:   Diagnosis Date     Ankle contracture, left      Class 3 severe obesity due to excess calories without serious comorbidity with body mass index (BMI) of 45.0 to 49.9 in adult (H)      Combined gastric and duodenal ulcer      Controlled substance agreement broken      Depression      Dyslipidemia      Edema      Edema      Gait abnormality      GERD (gastroesophageal reflux disease)      Gout      Lymphedema of both lower extremities      Melanoma (H)     left upper arm     MS (multiple sclerosis) (H)      RLS (restless legs syndrome)      Skin ulcer of left lower leg (H)      Valgus deformity of both feet      Vitamin D deficiency       Past Surgical History:   Procedure Laterality Date     ABLATION SAPHENOUS VEIN W/ RFA Right 2021    Saphenous vein, anterior accessory saphenous vein, sclerotherapy of multiple veins.     COSMETIC SURGERY      reduction of excess skin from weight loss     ESOPHAGOSCOPY, GASTROSCOPY, DUODENOSCOPY (EGD), COMBINED N/A 2015    Procedure: UPPER ENDOSCOPY with gastric biopsy;  Surgeon: Checo Fletcher MD;  Location: [facility];  Service:      MAMMOPLASTY REDUCTION Bilateral      MOHS MICROGRAPHIC PROCEDURE      left upper arm, melanoma     PICC SINGLE LUMEN PLACEMENT  2021          PICC SINGLE LUMEN PLACEMENT  2021          SPINE SURGERY      L5 area surgery, decompression      Allergies   Allergen Reactions     Other Environmental Allergy Anaphylaxis     Bees/Wasps Stings     Adhesive Tape Unknown     Adhesive [Mecrylate] Unknown     Other reaction(s): sores     Percocet [Oxycodone-Acetaminophen] Rash     Vicodin [Hydrocodone-Acetaminophen] Nausea and Vomiting and Hives      Social History     Tobacco Use     Smoking  status: Former Smoker     Packs/day: 0.25     Years: 12.00     Pack years: 3.00     Types: Cigarettes     Quit date: 1975     Years since quittin.2     Smokeless tobacco: Never Used     Tobacco comment: quit more than 30 years ago   Substance Use Topics     Alcohol use: Yes     Alcohol/week: 1.0 standard drink      Wt Readings from Last 1 Encounters:   22 122 kg (268 lb 14.4 oz)        Anesthesia Evaluation   Pt has had prior anesthetic.     No history of anesthetic complications       ROS/MED HX  ENT/Pulmonary:    (-) asthma, COPD and sleep apnea   Neurologic:     (+) Multiple Sclerosis,     Cardiovascular:     (+) hypertension-----    METS/Exercise Tolerance:     Hematologic:       Musculoskeletal:       GI/Hepatic:     (+) GERD, Asymptomatic on medication,     Renal/Genitourinary:       Endo:     (+) Obesity,  (-) Type II DM and thyroid disease   Psychiatric/Substance Use:       Infectious Disease:       Malignancy:       Other:            Physical Exam    Airway        Mallampati: II   TM distance: > 3 FB   Neck ROM: full   Mouth opening: > 3 cm    Respiratory Devices and Support         Dental  no notable dental history         Cardiovascular   cardiovascular exam normal          Pulmonary   pulmonary exam normal                OUTSIDE LABS:  CBC:   Lab Results   Component Value Date    WBC 9.7 10/20/2021    WBC 9.3 10/13/2021    HGB 10.2 (L) 10/20/2021    HGB 10.9 (L) 10/13/2021    HCT 33.2 (L) 10/20/2021    HCT 35.5 10/13/2021     10/20/2021     10/13/2021     BMP:   Lab Results   Component Value Date     2022     11/10/2021    POTASSIUM 4.6 2022    POTASSIUM 4.4 11/10/2021    CHLORIDE 105 2022    CHLORIDE 104 11/10/2021    CO2 25 2022    CO2 24 11/10/2021    BUN 22 2022    BUN 20 11/10/2021    CR 1.03 2022    CR 0.86 11/10/2021    GLC 87 2022     11/10/2021     COAGS: No results found for: PTT, INR, FIBR  POC: No  results found for: BGM, HCG, HCGS  HEPATIC:   Lab Results   Component Value Date    ALBUMIN 3.4 (L) 08/02/2021    PROTTOTAL 7.1 08/02/2021    ALT 31 08/02/2021    AST 16 10/20/2021    ALKPHOS 152 (H) 09/07/2021    BILITOTAL 0.4 08/02/2021     OTHER:   Lab Results   Component Value Date    A1C 5.9 06/23/2016    JUNI 9.2 03/17/2022    CRP 31.7 (H) 10/20/2021    SED 55 (H) 10/20/2021       Anesthesia Plan    ASA Status:  3      Anesthesia Type: General.     - Airway: ETT      Maintenance: Balanced.   Techniques and Equipment:     - Airway: Video-Laryngoscope         Consents    Anesthesia Plan(s) and associated risks, benefits, and realistic alternatives discussed. Questions answered and patient/representative(s) expressed understanding.    - Discussed:     - Discussed with:  Patient         Postoperative Care    Pain management: IV analgesics, Oral pain medications.   PONV prophylaxis: Ondansetron (or other 5HT-3), Dexamethasone or Solumedrol, Background Propofol Infusion     Comments:                Luh Alvarado

## 2022-03-21 NOTE — PROGRESS NOTES
hospitalist consult received; pt is a 77yo female with PMHx MS, chronic BLE lymphedema with venous insufficiency and chronic R leg cellulitis, gerd, hyperlipidemia, and depression who underwent previously scheduled excisional debridement of RLE cicumferential venous hypertensive ulceration and application of wound vac on 3/21/22. Preop H&P, notes reviewed. PTA medications have been reviewed by primary service. Patient's chronic medical conditions are longstanding and stable, at this time she does not require daily management/monitoring by hospitalist team. Will sign-off, call with questions.    Avel Silverio PA-C  3/21/2022, 5:33 PM  Pager: [phone]

## 2022-03-21 NOTE — BRIEF OP NOTE
[facility]    Brief Operative Note    Pre-operative diagnosis: Chronic ulcer of right leg, with fat layer exposed (H) [L97.912], lymphedema  Post-operative diagnosis Same as pre-operative diagnosis    Procedure: Procedure(s):  RIGHT LEG WOUND DEBRIDEMENT, PAULIE DERMATONE, MISONIX, VAC VERA FLOW WITH VASHE  Surgeon: Surgeon(s) and Role:     * Gabriele Bullock MD - Primary  Anesthesia: General   Estimated Blood Loss: 100 mL from 3/21/2022  9:47 AM to 3/21/2022 12:00 PM      Drains: Veraflo  Specimens: * No specimens in log *  Findings:   None.  Complications: None.  Implants: * No implants in log *

## 2022-03-21 NOTE — ANESTHESIA POSTPROCEDURE EVALUATION
Patient: Elizabeth Kelley    Procedure: Procedure(s):  RIGHT LEG WOUND DEBRIDEMENT, PAULIE DERMATONE, MISONIX, VAC VERA FLOW WITH VASHE       Anesthesia Type:  General    Note:  Disposition: Inpatient   Postop Pain Control: Uneventful            Sign Out: Well controlled pain   PONV: No   Neuro/Psych: Uneventful            Sign Out: Acceptable/Baseline neuro status   Airway/Respiratory: Uneventful            Sign Out: Acceptable/Baseline resp. status   CV/Hemodynamics: Uneventful            Sign Out: Acceptable CV status; No obvious hypovolemia; No obvious fluid overload   Other NRE: NONE   DID A NON-ROUTINE EVENT OCCUR?            Last vitals:  Vitals Value Taken Time   /87 03/21/22 1315   Temp 36.5  C (97.7  F) 03/21/22 1315   Pulse 94 03/21/22 1320   Resp 14 03/21/22 1320   SpO2 93 % 03/21/22 1320   Vitals shown include unvalidated device data.    Electronically Signed By: Luh Alvarado  March 21, 2022  1:21 PM

## 2022-03-21 NOTE — ANESTHESIA PROCEDURE NOTES
Airway       Patient location during procedure: OR       Procedure Start/Stop Times: 3/21/2022 9:59 AM  Staff -        Performed By: anesthesiologist and CRNA  Consent for Airway        Urgency: elective  Indications and Patient Condition       Indications for airway management: devon-procedural         Mask difficulty assessment: 2 - vent by mask + OA or adjuvant +/- NMBA    Final Airway Details       Final airway type: endotracheal airway       Successful airway: ETT - single  Endotracheal Airway Details        ETT size (mm): 7.0       Cuffed: yes       Successful intubation technique: video laryngoscopy       VL Blade Size: Glidescope 3       Grade View of Cords: 1       Adjucts: stylet       Position: Right       Measured from: gums/teeth       Secured at (cm): 20       Bite block used: None    Post intubation assessment        Placement verified by: capnometry, equal breath sounds and chest rise        Number of attempts at approach: 1       Number of other approaches attempted: 0       Secured with: silk tape       Ease of procedure: easy       Dentition: Intact and Unchanged

## 2022-03-21 NOTE — OP NOTE
"Procedure Date: March 21, 2022      SURGEON: Gabriele Bullock MD     ASSISTANT:  None.     PREOPERATIVE DIAGNOSIS:   1.    Chronic right lower extremity lymphedema, venous hypertensive ulceration, circumferential, proximally 500 cm      POSTOPERATIVE DIAGNOSIS:   1.    Same     PROCEDURES PERFORMED:  Excisional debridement of right lower extremity circumferential venous hypertensive ulceration with chronic lymphedema, excision of necrotic epidermis, dermis, subcutaneous tissue, 500 cm , sonic 1 ultrasonic debridement with Prontosan, application of negative pressure wound therapy vera flow with hypochlorous acid Vashe    ANESTHESIA:  General     ESTIMATED BLOOD LOSS:  100 cc          DESCRIPTION OF PROCEDURE: Informed consent was obtained.  Patient was appropriately marked.  Patient was then taken to the operating room and placed in the supine position.  All pressure points were well-padded. The patient's right thigh, calf, foot were then prepped and draped in a sterile fashion.  A timeout was performed.  Excisional debridement was performed with a dermatome of right lower extremity circumferential venous hypertensive ulceration, necrotic epidermis, dermis, subcutaneous tissues, no bone or tendon exposure, wound is approximately 500 cm .  To drain hemostasis, leg was elevated for approximately 15 minutes after being soaked with hypochlorous acid.  [place] ultrasonic debridement was then performed with Prontosan, 2 bottles, leg was then again elevated for 15 minutes with hypochlorous acid soak.  Next pressure wound therapy instill sponges were then stapled to the skin, occlusive dressing obtained, 2 aspiration ports were utilized and placed to a \"Y\" to the VAC device, occlusive seal obtained, installation initiated.  The patient tolerated the procedure and anesthesia well and was brought back to the recovery room, vital signs stable and vascular status intact to covering room.   Plan to return to the operating room " in approximately 72 hours for instill negative pressure exchange and subsequent split-thickness skin grafting when adequate wound bed preparation and granulation tissue with subsequent negative pressure wound therapy over split-thickness skin graft.      Gabriele Bullock MD  Pager [phone]

## 2022-03-22 LAB — GLUCOSE BLDC GLUCOMTR-MCNC: 108 MG/DL (ref 70–99)

## 2022-03-22 PROCEDURE — 82962 GLUCOSE BLOOD TEST: CPT

## 2022-03-22 PROCEDURE — 250N000013 HC RX MED GY IP 250 OP 250 PS 637: Performed by: SURGERY

## 2022-03-22 PROCEDURE — 120N000001 HC R&B MED SURG/OB

## 2022-03-22 PROCEDURE — 250N000011 HC RX IP 250 OP 636: Performed by: SURGERY

## 2022-03-22 RX ADMIN — KETOROLAC TROMETHAMINE 15 MG: 15 INJECTION, SOLUTION INTRAMUSCULAR; INTRAVENOUS at 11:12

## 2022-03-22 RX ADMIN — HYDROMORPHONE HYDROCHLORIDE 2 MG: 2 TABLET ORAL at 21:12

## 2022-03-22 RX ADMIN — PREGABALIN 100 MG: 100 CAPSULE ORAL at 08:04

## 2022-03-22 RX ADMIN — HYDROMORPHONE HYDROCHLORIDE 2 MG: 2 TABLET ORAL at 02:07

## 2022-03-22 RX ADMIN — CITALOPRAM HYDROBROMIDE 40 MG: 20 TABLET, FILM COATED ORAL at 08:04

## 2022-03-22 RX ADMIN — BUPROPION HYDROCHLORIDE 150 MG: 150 TABLET, EXTENDED RELEASE ORAL at 08:03

## 2022-03-22 RX ADMIN — HYDROMORPHONE HYDROCHLORIDE 2 MG: 2 TABLET ORAL at 17:35

## 2022-03-22 RX ADMIN — FUROSEMIDE 10 MG: 20 TABLET ORAL at 08:04

## 2022-03-22 RX ADMIN — KETOROLAC TROMETHAMINE 15 MG: 15 INJECTION, SOLUTION INTRAMUSCULAR; INTRAVENOUS at 05:11

## 2022-03-22 RX ADMIN — ASPIRIN 81 MG CHEWABLE TABLET 81 MG: 81 TABLET CHEWABLE at 20:40

## 2022-03-22 RX ADMIN — HYDROMORPHONE HYDROCHLORIDE 2 MG: 2 TABLET ORAL at 14:22

## 2022-03-22 RX ADMIN — KETOROLAC TROMETHAMINE 15 MG: 15 INJECTION, SOLUTION INTRAMUSCULAR; INTRAVENOUS at 17:34

## 2022-03-22 RX ADMIN — FUROSEMIDE 10 MG: 20 TABLET ORAL at 17:35

## 2022-03-22 RX ADMIN — Medication 1 CAPSULE: at 20:59

## 2022-03-22 RX ADMIN — HYDROMORPHONE HYDROCHLORIDE 2 MG: 2 TABLET ORAL at 08:03

## 2022-03-22 RX ADMIN — POTASSIUM CHLORIDE 20 MEQ: 1500 TABLET, EXTENDED RELEASE ORAL at 20:40

## 2022-03-22 RX ADMIN — Medication 1 CAPSULE: at 09:23

## 2022-03-22 RX ADMIN — HYDROMORPHONE HYDROCHLORIDE 2 MG: 2 TABLET ORAL at 11:12

## 2022-03-22 RX ADMIN — POTASSIUM CHLORIDE 40 MEQ: 1500 TABLET, EXTENDED RELEASE ORAL at 08:04

## 2022-03-22 RX ADMIN — HYDROMORPHONE HYDROCHLORIDE 2 MG: 2 TABLET ORAL at 05:11

## 2022-03-22 RX ADMIN — ROPINIROLE HYDROCHLORIDE 1 MG: 0.5 TABLET, FILM COATED ORAL at 20:42

## 2022-03-22 RX ADMIN — CEFAZOLIN SODIUM 2 G: 2 INJECTION, SOLUTION INTRAVENOUS at 02:07

## 2022-03-22 RX ADMIN — ROPINIROLE HYDROCHLORIDE 0.5 MG: 0.5 TABLET, FILM COATED ORAL at 13:46

## 2022-03-22 RX ADMIN — MULTIPLE VITAMINS W/ MINERALS TAB 1 TABLET: TAB at 08:04

## 2022-03-22 RX ADMIN — PREGABALIN 100 MG: 100 CAPSULE ORAL at 20:40

## 2022-03-22 RX ADMIN — Medication 250 MCG: at 08:04

## 2022-03-22 RX ADMIN — SPIRONOLACTONE 25 MG: 25 TABLET ORAL at 08:04

## 2022-03-22 RX ADMIN — CYANOCOBALAMIN TAB 1000 MCG 1000 MCG: 1000 TAB at 08:03

## 2022-03-22 RX ADMIN — ROPINIROLE HYDROCHLORIDE 0.5 MG: 0.5 TABLET, FILM COATED ORAL at 08:04

## 2022-03-22 RX ADMIN — PANTOPRAZOLE SODIUM 40 MG: 40 TABLET, DELAYED RELEASE ORAL at 08:04

## 2022-03-22 RX ADMIN — B-COMPLEX W/ C & FOLIC ACID TAB 1 TABLET: TAB at 08:03

## 2022-03-22 RX ADMIN — SIMVASTATIN 40 MG: 40 TABLET, FILM COATED ORAL at 20:42

## 2022-03-22 ASSESSMENT — ACTIVITIES OF DAILY LIVING (ADL)
ADLS_ACUITY_SCORE: 5
ADLS_ACUITY_SCORE: 9
ADLS_ACUITY_SCORE: 9
ADLS_ACUITY_SCORE: 5
ADLS_ACUITY_SCORE: 9
ADLS_ACUITY_SCORE: 5
ADLS_ACUITY_SCORE: 9

## 2022-03-22 NOTE — PLAN OF CARE
Goal Outcome Evaluation:    Plan of Care Reviewed With: patient   Pt alert and oriented, oral pain medications given with good results, tolerates diet, up to chair with assist of one with gait belt, wound vac in place with VASHE, garrison catheter in place, good urine output, passing flatus,  lungs clear,  O2 sats mid 90's on room air, compression stocking BLE adjusted to prevent pressure injury,

## 2022-03-22 NOTE — PLAN OF CARE
Goal Outcome Evaluation:    Plan of care reviewed with: patient    Carolina had a good evening. A&Ox4. VSS on 1L via NC. Capno continues, no concerns. Pain managed with repositioning, ice and PRN medications. Wedges on BLE. Amato placed for retention, draining adequately. Wound vac to RLE remains intact at -125mmHg, draining brown fluid. Cannister changed x1. Tolerating full liquids overnight, denies n/v. IV Abx continue, tolerating well. Anticipate discharge when cleared. Will continue to monitor.

## 2022-03-22 NOTE — PROVIDER NOTIFICATION
"Pt asking about taking her home medications  \"vein formula\" spoke with Dr Bullock and pharmacy, pt is on this medication already it is just listed under another name.  "

## 2022-03-22 NOTE — PROGRESS NOTES
Pain controlled status post extensive debridement of lower extremity chronic venous hypertensive ulceration, VAC instill with hypochlorous acid Vashe intact.  Anticipate return to the operating room on March 24 for dressing change and ideally would be adequately prepared for wound bed by March 28 for split-thickness skin grafting with subsequent application of negative pressure wound therapy.  Ambulate, general diet, elevate ankle above hip when sitting.  Ara

## 2022-03-22 NOTE — PROGRESS NOTES
"SPIRITUAL HEALTH SERVICES Progress Note  [facility]    Request -  Admission Request.      Illness Narrative - Carolina shared a wide-ranging conversation about karri, spirituality, and her health. She referenced chronic pain and making choices about fully living her life with MS.      Distress - Carolina explored thoughts about being \"spiritual not Rastafari\" but simultaneously being Religion, studying the bible, and having been brought up in the Rastafari Jainism. She spoke of the pain of perceiving those who profess to be Rastafari not behaving in ways that would \"walk the talk.\" Carolina also said she enjoys talking about Spiritism, had a Spiritism minor in college, and wishes more people could engage with her in that way.      Coping - Carolina said she has a long-time friend she speaks with regularly with whom she studies bible teachings . She also shared that she has a litany of prayers she says every night that helps her cope with physical pain, and she enjoys reading mysteries and watching tv as distractions to pain. Carolina also named her partner Bacilio as strongly supportive.      Meaning-Making - We explored notions of discernment, judgement, forgiveness, variety of spiritual paths, and  spirituality in relationships.      Plan - I offered reflective conversation and emotional support, affirmed experiences, encouraged contemplation, said a prayer and sang a blessing.    Carolina asked for follow-up visits, as she expects to be in hospital for some time.     will follow.    Rev Deysi Oliver  Associate   Spiritual Health Phone Line [phone]  Spiritual Health Pager [phone]  "

## 2022-03-23 ENCOUNTER — APPOINTMENT (OUTPATIENT)
Dept: GENERAL RADIOLOGY | Facility: CLINIC | Age: 77
DRG: 264 | End: 2022-03-23
Attending: SURGERY
Payer: COMMERCIAL

## 2022-03-23 LAB
ALBUMIN SERPL-MCNC: 2 G/DL (ref 3.4–5)
ALBUMIN UR-MCNC: 50 MG/DL
ALP SERPL-CCNC: 156 U/L (ref 40–150)
ALT SERPL W P-5'-P-CCNC: 29 U/L (ref 0–50)
ANION GAP SERPL CALCULATED.3IONS-SCNC: 2 MMOL/L (ref 3–14)
APPEARANCE UR: CLEAR
AST SERPL W P-5'-P-CCNC: 63 U/L (ref 0–45)
BASE EXCESS BLDV CALC-SCNC: 1.9 MMOL/L (ref -7.7–1.9)
BILIRUB DIRECT SERPL-MCNC: <0.1 MG/DL (ref 0–0.2)
BILIRUB SERPL-MCNC: 0.2 MG/DL (ref 0.2–1.3)
BILIRUB UR QL STRIP: NEGATIVE
BUN SERPL-MCNC: 32 MG/DL (ref 7–30)
CALCIUM SERPL-MCNC: 9.2 MG/DL (ref 8.5–10.1)
CHLORIDE BLD-SCNC: 102 MMOL/L (ref 94–109)
CO2 SERPL-SCNC: 28 MMOL/L (ref 20–32)
COLOR UR AUTO: YELLOW
CREAT SERPL-MCNC: 1.04 MG/DL (ref 0.52–1.04)
CREAT SERPL-MCNC: 1.07 MG/DL (ref 0.52–1.04)
ERYTHROCYTE [DISTWIDTH] IN BLOOD BY AUTOMATED COUNT: 15.1 % (ref 10–15)
GFR SERPL CREATININE-BSD FRML MDRD: 54 ML/MIN/1.73M2
GFR SERPL CREATININE-BSD FRML MDRD: 56 ML/MIN/1.73M2
GLUCOSE BLD-MCNC: 130 MG/DL (ref 70–99)
GLUCOSE BLDC GLUCOMTR-MCNC: 111 MG/DL (ref 70–99)
GLUCOSE BLDC GLUCOMTR-MCNC: 94 MG/DL (ref 70–99)
GLUCOSE UR STRIP-MCNC: NEGATIVE MG/DL
HCO3 BLDV-SCNC: 28 MMOL/L (ref 21–28)
HCT VFR BLD AUTO: 28.8 % (ref 35–47)
HGB BLD-MCNC: 8.3 G/DL (ref 11.7–15.7)
HGB UR QL STRIP: ABNORMAL
HYALINE CASTS: 1 /LPF
KETONES UR STRIP-MCNC: ABNORMAL MG/DL
LACTATE SERPL-SCNC: 1.3 MMOL/L (ref 0.7–2)
LEUKOCYTE ESTERASE UR QL STRIP: ABNORMAL
MCH RBC QN AUTO: 25.7 PG (ref 26.5–33)
MCHC RBC AUTO-ENTMCNC: 28.8 G/DL (ref 31.5–36.5)
MCV RBC AUTO: 89 FL (ref 78–100)
MUCOUS THREADS #/AREA URNS LPF: PRESENT /LPF
NITRATE UR QL: NEGATIVE
NT-PROBNP SERPL-MCNC: 373 PG/ML (ref 0–900)
O2/TOTAL GAS SETTING VFR VENT: 21 %
PCO2 BLDV: 54 MM HG (ref 40–50)
PH BLDV: 7.33 [PH] (ref 7.32–7.43)
PH UR STRIP: 6 [PH] (ref 5–7)
PLATELET # BLD AUTO: 436 10E3/UL (ref 150–450)
PO2 BLDV: 57 MM HG (ref 25–47)
POTASSIUM BLD-SCNC: 5.3 MMOL/L (ref 3.4–5.3)
PROCALCITONIN SERPL-MCNC: 0.17 NG/ML
PROT SERPL-MCNC: 5.8 G/DL (ref 6.8–8.8)
RBC # BLD AUTO: 3.23 10E6/UL (ref 3.8–5.2)
RBC URINE: 133 /HPF
SODIUM SERPL-SCNC: 132 MMOL/L (ref 133–144)
SP GR UR STRIP: 1.03 (ref 1–1.03)
UROBILINOGEN UR STRIP-MCNC: NORMAL MG/DL
WBC # BLD AUTO: 18.3 10E3/UL (ref 4–11)
WBC URINE: 7 /HPF

## 2022-03-23 PROCEDURE — 82803 BLOOD GASES ANY COMBINATION: CPT | Performed by: PHYSICIAN ASSISTANT

## 2022-03-23 PROCEDURE — 83880 ASSAY OF NATRIURETIC PEPTIDE: CPT | Performed by: PHYSICIAN ASSISTANT

## 2022-03-23 PROCEDURE — 71045 X-RAY EXAM CHEST 1 VIEW: CPT

## 2022-03-23 PROCEDURE — 99207 PR CONSULT E&M CHANGED TO INITIAL LEVEL: CPT | Performed by: PHYSICIAN ASSISTANT

## 2022-03-23 PROCEDURE — 87040 BLOOD CULTURE FOR BACTERIA: CPT | Performed by: PHYSICIAN ASSISTANT

## 2022-03-23 PROCEDURE — 82310 ASSAY OF CALCIUM: CPT | Performed by: SURGERY

## 2022-03-23 PROCEDURE — 84145 PROCALCITONIN (PCT): CPT | Performed by: PHYSICIAN ASSISTANT

## 2022-03-23 PROCEDURE — 93005 ELECTROCARDIOGRAM TRACING: CPT

## 2022-03-23 PROCEDURE — 81001 URINALYSIS AUTO W/SCOPE: CPT | Performed by: PHYSICIAN ASSISTANT

## 2022-03-23 PROCEDURE — 83605 ASSAY OF LACTIC ACID: CPT | Performed by: SURGERY

## 2022-03-23 PROCEDURE — 99223 1ST HOSP IP/OBS HIGH 75: CPT | Performed by: PHYSICIAN ASSISTANT

## 2022-03-23 PROCEDURE — 258N000003 HC RX IP 258 OP 636: Performed by: PHYSICIAN ASSISTANT

## 2022-03-23 PROCEDURE — 94640 AIRWAY INHALATION TREATMENT: CPT

## 2022-03-23 PROCEDURE — 250N000009 HC RX 250: Performed by: PHYSICIAN ASSISTANT

## 2022-03-23 PROCEDURE — 250N000011 HC RX IP 250 OP 636: Performed by: SURGERY

## 2022-03-23 PROCEDURE — 999N000128 HC STATISTIC PERIPHERAL IV START W/O US GUIDANCE

## 2022-03-23 PROCEDURE — 94640 AIRWAY INHALATION TREATMENT: CPT | Mod: 76

## 2022-03-23 PROCEDURE — 80053 COMPREHEN METABOLIC PANEL: CPT | Performed by: SURGERY

## 2022-03-23 PROCEDURE — 250N000011 HC RX IP 250 OP 636: Performed by: PHYSICIAN ASSISTANT

## 2022-03-23 PROCEDURE — 258N000003 HC RX IP 258 OP 636: Performed by: SURGERY

## 2022-03-23 PROCEDURE — 82248 BILIRUBIN DIRECT: CPT | Performed by: PHYSICIAN ASSISTANT

## 2022-03-23 PROCEDURE — 85027 COMPLETE CBC AUTOMATED: CPT | Performed by: SURGERY

## 2022-03-23 PROCEDURE — 36415 COLL VENOUS BLD VENIPUNCTURE: CPT | Performed by: SURGERY

## 2022-03-23 PROCEDURE — 120N000013 HC R&B IMCU

## 2022-03-23 PROCEDURE — 250N000013 HC RX MED GY IP 250 OP 250 PS 637: Performed by: SURGERY

## 2022-03-23 PROCEDURE — 250N000013 HC RX MED GY IP 250 OP 250 PS 637: Performed by: PHYSICIAN ASSISTANT

## 2022-03-23 PROCEDURE — 258N000003 HC RX IP 258 OP 636

## 2022-03-23 PROCEDURE — 250N000011 HC RX IP 250 OP 636

## 2022-03-23 PROCEDURE — 36415 COLL VENOUS BLD VENIPUNCTURE: CPT | Performed by: PHYSICIAN ASSISTANT

## 2022-03-23 RX ORDER — ACETAMINOPHEN 650 MG/1
650 SUPPOSITORY RECTAL EVERY 4 HOURS PRN
Status: DISCONTINUED | OUTPATIENT
Start: 2022-03-23 | End: 2022-04-29 | Stop reason: HOSPADM

## 2022-03-23 RX ORDER — ACETAMINOPHEN 325 MG/1
650 TABLET ORAL EVERY 4 HOURS PRN
Status: DISCONTINUED | OUTPATIENT
Start: 2022-03-23 | End: 2022-04-29 | Stop reason: HOSPADM

## 2022-03-23 RX ORDER — LIDOCAINE 40 MG/G
CREAM TOPICAL
Status: DISCONTINUED | OUTPATIENT
Start: 2022-03-23 | End: 2022-03-23

## 2022-03-23 RX ORDER — ALBUTEROL SULFATE 0.83 MG/ML
2.5 SOLUTION RESPIRATORY (INHALATION)
Status: DISCONTINUED | OUTPATIENT
Start: 2022-03-23 | End: 2022-04-29 | Stop reason: HOSPADM

## 2022-03-23 RX ORDER — NALOXONE HYDROCHLORIDE 1 MG/ML
1 INJECTION PARENTERAL 3 TIMES DAILY
Status: DISCONTINUED | OUTPATIENT
Start: 2022-03-23 | End: 2022-03-23

## 2022-03-23 RX ORDER — PIPERACILLIN SODIUM, TAZOBACTAM SODIUM 3; .375 G/15ML; G/15ML
3.38 INJECTION, POWDER, LYOPHILIZED, FOR SOLUTION INTRAVENOUS EVERY 6 HOURS
Status: DISCONTINUED | OUTPATIENT
Start: 2022-03-23 | End: 2022-03-23

## 2022-03-23 RX ORDER — NITROGLYCERIN 0.4 MG/1
0.4 TABLET SUBLINGUAL EVERY 5 MIN PRN
Status: DISCONTINUED | OUTPATIENT
Start: 2022-03-23 | End: 2022-04-03

## 2022-03-23 RX ORDER — IPRATROPIUM BROMIDE AND ALBUTEROL SULFATE 2.5; .5 MG/3ML; MG/3ML
3 SOLUTION RESPIRATORY (INHALATION)
Status: DISCONTINUED | OUTPATIENT
Start: 2022-03-23 | End: 2022-04-29 | Stop reason: HOSPADM

## 2022-03-23 RX ORDER — SODIUM CHLORIDE, SODIUM LACTATE, POTASSIUM CHLORIDE, CALCIUM CHLORIDE 600; 310; 30; 20 MG/100ML; MG/100ML; MG/100ML; MG/100ML
INJECTION, SOLUTION INTRAVENOUS CONTINUOUS
Status: DISCONTINUED | OUTPATIENT
Start: 2022-03-23 | End: 2022-03-24

## 2022-03-23 RX ORDER — PIPERACILLIN SODIUM, TAZOBACTAM SODIUM 3; .375 G/15ML; G/15ML
3.38 INJECTION, POWDER, LYOPHILIZED, FOR SOLUTION INTRAVENOUS EVERY 6 HOURS
Status: DISCONTINUED | OUTPATIENT
Start: 2022-03-23 | End: 2022-03-30

## 2022-03-23 RX ORDER — LIDOCAINE 40 MG/G
CREAM TOPICAL
Status: DISCONTINUED | OUTPATIENT
Start: 2022-03-23 | End: 2022-04-03

## 2022-03-23 RX ADMIN — PREGABALIN 100 MG: 100 CAPSULE ORAL at 07:54

## 2022-03-23 RX ADMIN — HYDROMORPHONE HYDROCHLORIDE 2 MG: 2 TABLET ORAL at 17:26

## 2022-03-23 RX ADMIN — VANCOMYCIN HYDROCHLORIDE 1500 MG: 5 INJECTION, POWDER, LYOPHILIZED, FOR SOLUTION INTRAVENOUS at 19:06

## 2022-03-23 RX ADMIN — KETOROLAC TROMETHAMINE 15 MG: 15 INJECTION, SOLUTION INTRAMUSCULAR; INTRAVENOUS at 07:46

## 2022-03-23 RX ADMIN — NALOXONE HYDROCHLORIDE 1 MG: 1 INJECTION PARENTERAL at 16:36

## 2022-03-23 RX ADMIN — Medication 1 PACKET: at 08:40

## 2022-03-23 RX ADMIN — Medication 1 PACKET: at 21:19

## 2022-03-23 RX ADMIN — Medication 1 CAPSULE: at 08:40

## 2022-03-23 RX ADMIN — POTASSIUM CHLORIDE 40 MEQ: 1500 TABLET, EXTENDED RELEASE ORAL at 07:53

## 2022-03-23 RX ADMIN — PIPERACILLIN SODIUM AND TAZOBACTAM SODIUM 3.38 G: 3; .375 INJECTION, POWDER, LYOPHILIZED, FOR SOLUTION INTRAVENOUS at 22:55

## 2022-03-23 RX ADMIN — SODIUM CHLORIDE, POTASSIUM CHLORIDE, SODIUM LACTATE AND CALCIUM CHLORIDE 1000 ML: 600; 310; 30; 20 INJECTION, SOLUTION INTRAVENOUS at 19:06

## 2022-03-23 RX ADMIN — HYDROMORPHONE HYDROCHLORIDE 2 MG: 2 TABLET ORAL at 08:40

## 2022-03-23 RX ADMIN — ACETAMINOPHEN 650 MG: 325 TABLET, FILM COATED ORAL at 18:55

## 2022-03-23 RX ADMIN — POTASSIUM CHLORIDE 20 MEQ: 1500 TABLET, EXTENDED RELEASE ORAL at 20:36

## 2022-03-23 RX ADMIN — PREGABALIN 100 MG: 100 CAPSULE ORAL at 21:18

## 2022-03-23 RX ADMIN — SODIUM CHLORIDE 500 ML: 9 INJECTION, SOLUTION INTRAVENOUS at 16:22

## 2022-03-23 RX ADMIN — KETOROLAC TROMETHAMINE 15 MG: 15 INJECTION, SOLUTION INTRAMUSCULAR; INTRAVENOUS at 00:16

## 2022-03-23 RX ADMIN — CITALOPRAM HYDROBROMIDE 40 MG: 20 TABLET, FILM COATED ORAL at 07:52

## 2022-03-23 RX ADMIN — Medication 250 MCG: at 07:52

## 2022-03-23 RX ADMIN — FUROSEMIDE 10 MG: 20 TABLET ORAL at 07:54

## 2022-03-23 RX ADMIN — MULTIPLE VITAMINS W/ MINERALS TAB 1 TABLET: TAB at 07:53

## 2022-03-23 RX ADMIN — IPRATROPIUM BROMIDE AND ALBUTEROL SULFATE 3 ML: .5; 3 SOLUTION RESPIRATORY (INHALATION) at 21:00

## 2022-03-23 RX ADMIN — B-COMPLEX W/ C & FOLIC ACID TAB 1 TABLET: TAB at 07:52

## 2022-03-23 RX ADMIN — BUPROPION HYDROCHLORIDE 150 MG: 150 TABLET, EXTENDED RELEASE ORAL at 07:54

## 2022-03-23 RX ADMIN — HYDROMORPHONE HYDROCHLORIDE 2 MG: 2 TABLET ORAL at 00:16

## 2022-03-23 RX ADMIN — SIMVASTATIN 40 MG: 40 TABLET, FILM COATED ORAL at 21:18

## 2022-03-23 RX ADMIN — ASPIRIN 81 MG CHEWABLE TABLET 81 MG: 81 TABLET CHEWABLE at 20:36

## 2022-03-23 RX ADMIN — HYDROMORPHONE HYDROCHLORIDE 2 MG: 2 TABLET ORAL at 05:29

## 2022-03-23 RX ADMIN — IPRATROPIUM BROMIDE AND ALBUTEROL SULFATE 3 ML: .5; 3 SOLUTION RESPIRATORY (INHALATION) at 23:20

## 2022-03-23 RX ADMIN — Medication 1 CAPSULE: at 22:54

## 2022-03-23 RX ADMIN — PANTOPRAZOLE SODIUM 40 MG: 40 TABLET, DELAYED RELEASE ORAL at 07:54

## 2022-03-23 RX ADMIN — ROPINIROLE HYDROCHLORIDE 1 MG: 0.5 TABLET, FILM COATED ORAL at 21:18

## 2022-03-23 RX ADMIN — CYANOCOBALAMIN TAB 1000 MCG 1000 MCG: 1000 TAB at 07:53

## 2022-03-23 RX ADMIN — PIPERACILLIN SODIUM AND TAZOBACTAM SODIUM 3.38 G: 3; .375 INJECTION, POWDER, LYOPHILIZED, FOR SOLUTION INTRAVENOUS at 17:26

## 2022-03-23 RX ADMIN — ROPINIROLE HYDROCHLORIDE 0.5 MG: 0.5 TABLET, FILM COATED ORAL at 12:53

## 2022-03-23 RX ADMIN — SPIRONOLACTONE 25 MG: 25 TABLET ORAL at 07:54

## 2022-03-23 RX ADMIN — ROPINIROLE HYDROCHLORIDE 0.5 MG: 0.5 TABLET, FILM COATED ORAL at 07:53

## 2022-03-23 ASSESSMENT — ACTIVITIES OF DAILY LIVING (ADL)
ADLS_ACUITY_SCORE: 9
ADLS_ACUITY_SCORE: 9
ADLS_ACUITY_SCORE: 7
ADLS_ACUITY_SCORE: 9
ADLS_ACUITY_SCORE: 8
ADLS_ACUITY_SCORE: 7
ADLS_ACUITY_SCORE: 9
ADLS_ACUITY_SCORE: 7
ADLS_ACUITY_SCORE: 9
ADLS_ACUITY_SCORE: 7
ADLS_ACUITY_SCORE: 8
ADLS_ACUITY_SCORE: 9
ADLS_ACUITY_SCORE: 9
ADLS_ACUITY_SCORE: 8
ADLS_ACUITY_SCORE: 7
ADLS_ACUITY_SCORE: 9
ADLS_ACUITY_SCORE: 7
ADLS_ACUITY_SCORE: 9
ADLS_ACUITY_SCORE: 9
ADLS_ACUITY_SCORE: 7
ADLS_ACUITY_SCORE: 8

## 2022-03-23 NOTE — CODE/RAPID RESPONSE
Brief [place] NP Note    Paged at 1503 for new altered mental status. RN stated that she was unable to arouse the patient and the patient was disoriented. When I arrived to assess the patient she did require persistent verbal stimulation but did arouse and was oriented x4 at that time. Vitals were stable on RA with a temperature of 98.8 when I assessed the patient. Patient had no complaints at that time and did not endorse abnormal findings aside form pain to her RLE which is the site of her surgical wound.    On neurological exam the patient had no focal deficits but seemed very tired. RN had concerns regarding fluids as patient had not had good PO intake. I asked RN to page surgery service as they were primary but I would order lab work and follow-up. Surgery ordered a stat hospitalist consult who then ordered blood cultures and antibiotics.    House team will continue to be available for any further needs.    LAXMI Rojas, CNP  Hospitalist - [facility]  Text me on the [facility] for a textback  Text page with web based paging for a callback

## 2022-03-23 NOTE — PROGRESS NOTES
SPIRITUAL HEALTH SERVICES Progress Note  [facility]    Follow-Up visit per Ptnt request.    Carolina was very sleepy when I visited; it seemed hard for her to stay awake. She asked me to come back again another time.    I offered a blessing, and then shared my observations with nursing.    SH will continue to follow.    Rev Deysi Oliver  Associate   Spiritual Health Phone Line [phone]  Spiritual Health Pager [phone]

## 2022-03-23 NOTE — PLAN OF CARE
Goal Outcome Evaluation:    Plan of Care Reviewed With: patient   Pt lethargic, trouble with words, unable to keep eyes open, hand strength normal, lung sounds diminished with wheezes noted, O2 sats low 90's high 80's on 2 L NC, O2 increased to 3 L, low urine output, RRT called for change in status

## 2022-03-23 NOTE — H&P (VIEW-ONLY)
[facility]  Consult Note - Hospitalist Service  Date of Admission:  3/21/2022  Consult Requested by: Dr. Bullock  Reason for Consult: Decreased responsiveness, fever     Assessment & Plan   Elizabeth Kelley is a 76 year old female with PMHx MS, chronic BLE lymphedema with venous insufficiency and chronic R leg cellulitis, GERD, hyperlipidemia, and depression who underwent previously scheduled excisional debridement of RLE cicumferential venous hypertensive ulceration and application of wound vac on 3/21/22. Hospitalist service re-consulted 3/23/22 for decreased responsiveness, meeting sepsis criteria with suspected pneumonia as source.     Sepsis secondary to CAP vs aspiration pneumonia  Acute hypoxic respiratory failure secondary to above  Hx of MRSA: Tmax 101.5, tachycardic in the 110s, hypoxic to 86% on room air. WBC 18.3, procal 0.17. Lactic 1.3. UA with trace LE, WBC 7. . VBG 7.33/54/57/28. CXR with patchy airspace opacities in the left mid and lower lung, suspicious for pneumonia.  Highest suspicion for pneumonia given the above, though note hx of recurrent RLE cellulitis with procedure as above. Hx of MRSA. RLE with wound vac in place.   - Move to [facility] on [address]   - Volume resucitated to 3660 ccs per sepsis protocol   - Started IV Zosyn + Vancomycin (received 2 doses of Ancef devon and post-op)  - Blood cultures ordered and pending   - RCAT consulted, encourage pulmonary toilet with IS and acapella   - Duonebs q4 hrs while awake, PRN albuterol nebs   - Ween oxygen as above   - SLP consult for concern for aspiration pneumonia, pt does not recall any specific events, no hx of dysphagia.     Encephalopathy, multifactorial: Altered mentation this afternoon noted by nursing. Multifactorial in the setting of fever, narcotic pain medications, possible dehydration. Received dose of narcan, but did not significantly change pts mentation immediately, though seemed to improve within the hour  "after IVF bolus was near completion. Oriented X2 initially on my exam, drifting to sleep midway through conversation, but easily arousable. Mentation improved upon reevaluation within 30 minutes.   - Treat infection as above   - Judicious use or narcotics   - Continue PTA Lyrica 100 mg BID, but low threshold to hold for sedation    Acute normocytic anemia: Baseline 10-11. Down to 8.3 post-procedure. No active bleeding. Could be dilutional as checked while receiving IVF bolus   - Monitor  - Hold PTA ASA 81 mg po every day   - Hold/discontinue NSAIDs (scheduled celebrex and Toradol)      Recent Labs   Lab 03/23/22  1626   HGB 8.3*     Chronic BLE lymphedema with venous insufficiency and chronic R leg cellulitis s/p excisional debridement of RLE cicumferential venous hypertensive ulceration and application of wound vac (3/21/22)  - Defer routine post-operative cares to Dr. Bullock   - Plan for another trip to the OR once medically optimized   - Holding PTA Lasix and potassium supplementation while receiving IVF for sepsis   - Continue PTA Lyrica 100 mg BID, but hold for sedation    Severe Obesity: Estimated body mass index is 50.81 kg/m  as calculated from the following:    Height as of this encounter: 1.549 m (5' 1\").    Weight as of this encounter: 122 kg (268 lb 14.4 oz).      MS: Ambulatory at baseline, though some weakness in lower extremities and intermittent upper extremity weakness.     GERD: Continue Protonix   Hyperlipidemia: Continue statin   Depression: Continue Celexa, Wellbutrin  RLS: Continue PTA Requip      The patient's care was discussed with the Attending Physician, Dr. Hunter, Bedside Nurse, Patient, Patient's Family and Primary team. Spoke with Dr. Bullock and pt's daughter, Carolyn, at bedside.     50 minutes of critical care time spent on this case including expediting care, chart review, bedside assessment. 50% spent in conversation with primary team and family.      Светлана Velasquez, " "CAITY  [facility]  Securely message with the [employer] Web Console (learn more here)  Text page via [employer] Paging/Directory     Hospitalist Service  ______________________________________________________________________    Chief Complaint   Decreased level of responsiveness    History is obtained from the patient and chart review.     History of Present Illness   Elizabeth Kelley is a 76 year old female with PMHx MS, chronic BLE lymphedema with venous insufficiency and chronic R leg cellulitis, GERD, hyperlipidemia, and depression who underwent previously scheduled excisional debridement of RLE cicumferential venous hypertensive ulceration and application of wound vac on 3/21/22. Hospitalist service re-consulted 3/23/22 for decreased responsiveness, meeting sepsis criteria with suspected pneumonia as source.     STAT consulted for decreased level of responsiveness, fever, low urine output. Tmax 101.5, tachycardic in the 110s, hypoxic to 86% on room air. WBC 18.3, procal 0.17. Lactic 1.3. UA with trace LE, WBC 7. . VBG 7.33/54/57/28. CXR with patchy airspace opacities in the left mid and lower lung, suspicious for pneumonia.  Highest suspicion for pneumonia given the above, though note hx of recurrent RLE cellulitis with procedure as above. Hx of MRSA. RLE with wound vac in place.     Altered mentation this afternoon noted by nursing. Multifactorial in the setting of fever, narcotic pain medications (though hadn't received since the AM), possible dehydration. Received dose of narcan, but did not significantly change pts mentation immediately, though seemed to improve within the hour after IVF bolus was near completion. Oriented X2 initially on my exam, drifting to sleep midway through conversation, but easily arousable. Mentation improved upon reevaluation within 30 minutes.     Daughter reports hx of \"walking pneumonia.\" Reviewed last [facility] in [facility] for RLE cellulitis " for which pt was treated with Vanc + Zosyn and ultimately transitioned to Augmentin + doxy at discharge. No abdominal sx.     Review of Systems   The 10 point Review of Systems is negative other than noted in the HPI.    Past Medical History    I have reviewed this patient's medical history and updated it with pertinent information if needed.   Past Medical History:   Diagnosis Date     Ankle contracture, left      Class 3 severe obesity due to excess calories without serious comorbidity with body mass index (BMI) of 45.0 to 49.9 in adult (H)      Combined gastric and duodenal ulcer      Controlled substance agreement broken      Depression      Dyslipidemia      Edema      Edema      Gait abnormality      GERD (gastroesophageal reflux disease)      Gout      Lymphedema of both lower extremities      Melanoma (H)     left upper arm     MS (multiple sclerosis) (H)      RLS (restless legs syndrome)      Skin ulcer of left lower leg (H)      Valgus deformity of both feet      Vitamin D deficiency      Past Surgical History   I have reviewed this patient's surgical history and updated it with pertinent information if needed.  Past Surgical History:   Procedure Laterality Date     ABLATION SAPHENOUS VEIN W/ RFA Right 04/12/2021    Saphenous vein, anterior accessory saphenous vein, sclerotherapy of multiple veins.     COSMETIC SURGERY  1988    reduction of excess skin from weight loss     ESOPHAGOSCOPY, GASTROSCOPY, DUODENOSCOPY (EGD), COMBINED N/A 03/30/2015    Procedure: UPPER ENDOSCOPY with gastric biopsy;  Surgeon: Checo Fletcher MD;  Location: [facility] GI;  Service:      IRRIGATION AND DEBRIDEMENT LOWER EXTREMITY, COMBINED Right 3/21/2022    Procedure: RIGHT LEG WOUND DEBRIDEMENT, PAULIE DERMATONE, MISONIX, VAC VERA FLOW WITH VASHE;  Surgeon: Gabriele Bullock MD;  Location: [facility] OR     MAMMOPLASTY REDUCTION Bilateral      MOHS MICROGRAPHIC PROCEDURE      left upper arm, melanoma     PICC SINGLE LUMEN PLACEMENT   2021          PICC SINGLE LUMEN PLACEMENT  2021          SPINE SURGERY      L5 area surgery, decompression     Social History   I have reviewed this patient's social history and updated it with pertinent information if needed.  Social History     Tobacco Use     Smoking status: Former Smoker     Packs/day: 0.25     Years: 12.00     Pack years: 3.00     Types: Cigarettes     Quit date: 1975     Years since quittin.2     Smokeless tobacco: Never Used     Tobacco comment: quit more than 30 years ago   Substance Use Topics     Alcohol use: Yes     Alcohol/week: 1.0 standard drink     Drug use: No     Family History   I have reviewed this patient's family history and updated it with pertinent information if needed.  Family History   Problem Relation Age of Onset     Uterine Cancer Mother      Hyperlipidemia Mother      Hypertension Mother      Multiple Sclerosis Mother      Asthma Sister      Obesity Sister      Hyperlipidemia Sister      Hypertension Sister      Multiple Sclerosis Sister      Arthritis Sister      Colon Cancer Paternal Aunt      Breast Cancer Paternal Aunt      Hypertension Paternal Aunt      Hyperlipidemia Paternal Aunt      Brain Cancer Father      Arthritis Maternal Uncle      Arthritis Maternal Grandmother      Early Death Maternal Grandfather      Arthritis Paternal Grandmother      Arthritis Paternal Grandfather        Medications   Medications Prior to Admission   Medication Sig Dispense Refill Last Dose     aspirin 81 mg chewable tablet Take 81 mg by mouth every evening    3/11/2022 at PM     Bioflavonoid Products (CINDY-C) 500-550 MG TABS Take 1 tablet by mouth 2 times daily 60 tablet 3 3/20/2022 at PM     buPROPion (WELLBUTRIN SR) 150 MG 12 hr tablet Take 150 mg by mouth every morning    3/20/2022 at AM     citalopram (CELEXA) 40 MG tablet Take 40 mg by mouth every morning    3/20/2022 at AM     cyanocobalamin (VITAMIN B-12) 1000 MCG tablet Take 1 tablet (1,000 mcg) by  "mouth daily 60 tablet 3 3/20/2022 at AM     furosemide (LASIX) 20 MG tablet Take 10 mg by mouth 2 times daily AM and afternoon (4PM)   3/20/2022 at pm     HYDROcodone-acetaminophen (NORCO) 5-325 MG tablet TAKE 1 TABLET BY MOUTH EVERY 8 HOURS AS NEEDED FOR 4 DAYS   Past Month at PRN     multivitamin w/minerals (CENTRUM ADULTS) tablet Take 1 tablet by mouth daily    3/20/2022 at AM     naproxen sodium (ALEVE) 220 MG tablet Take 440 mg by mouth daily as needed (220MG X 2 = 440MG)   3/18/2022 at PRN     Nutritional Supplements (VARICOSE VEINS FORMULA OR) Take 1 tablet by mouth every morning   3/20/2022 at AM     pantoprazole (PROTONIX) 40 MG tablet [PANTOPRAZOLE (PROTONIX) 40 MG TABLET] Take 40 mg by mouth daily.   3/20/2022 at AM     potassium chloride ER (KLOR-CON M) 20 MEQ CR tablet Take 40 mEq by mouth every morning    3/20/2022 at am     potassium chloride ER (KLOR-CON M) 20 MEQ CR tablet Take 20 mEq by mouth every evening (TAKE IN ADDITION TO AM/NOON DOSE FOR TOTAL 30mEq DAILY)   3/20/2022 at PM     pregabalin (LYRICA) 100 MG capsule Take 100 mg by mouth 2 times daily    3/20/2022 at PM     rOPINIRole (REQUIP) 1 MG tablet Take 0.5 mg by mouth 2 times daily IN THE MORNING AND AT NOON  (1MG X 0.5 = 0.5MG)  (TAKE IN ADDITION TO PM DOSE FOR TOTAL 2MG DAILY)   3/20/2022 at 1200     rOPINIRole (REQUIP) 1 MG tablet Take 1 mg by mouth At Bedtime (TAKE IN ADDITION TO AM/NOON DOSE FOR TOTAL 2MG DAILY)   3/20/2022 at PM     simvastatin (ZOCOR) 40 MG tablet [SIMVASTATIN (ZOCOR) 40 MG TABLET] Take 40 mg by mouth bedtime.   3/20/2022 at PM     Skin Protectants, Misc. (INTERDRY 10\"X36\") SHEE Externally apply 7 Units topically once as needed (change daily in groin rash/fold) 7 each 3      spironolactone (ALDACTONE) 25 MG tablet [SPIRONOLACTONE (ALDACTONE) 25 MG TABLET] Take 25 mg by mouth daily.   3/20/2022 at AM     vitamin B-Complex Take 1 tablet by mouth daily 60 tablet 3 3/20/2022 at AM     vitamin D3 (CHOLECALCIFEROL) 250 " mcg (38983 units) capsule Take 1 capsule (250 mcg) by mouth daily 60 capsule 3 3/20/2022 at AM       Allergies   Allergies   Allergen Reactions     Other Environmental Allergy Anaphylaxis     Bees/Wasps Stings     Adhesive Tape Unknown     Adhesive [Mecrylate] Unknown     Other reaction(s): sores     Percocet [Oxycodone-Acetaminophen] Rash     Vicodin [Hydrocodone-Acetaminophen] Nausea and Vomiting and Hives       Physical Exam   Vital Signs: Temp: (!) 100.6  F (38.1  C) Temp src: Oral BP: 138/60 Pulse: 95   Resp: (!) 39 SpO2: 93 % O2 Device: Nasal cannula Oxygen Delivery: 3 LPM  Weight: 268 lbs 14.4 oz    CONSTITUTIONAL: Pt laying in bed, dressed in hospital garb. Appears somnolent, arouses to voice, but drifts to sleep mid conversation. More alert on recheck 45 min later, oriented X3. Cooperative with interview.   HEENT: Normocephalic, atraumatic.   CARDIOVASCULAR: Regular rhythm, tachycardic. No murmurs, rubs, or extra heart sounds appreciated. Pulses +2/4 and regular in upper and lower extremities, bilaterally.   RESPIRATORY: No increased work of breathing.  Supplemental oxygenation via NC at 3 LPM. Diminished lung sounds on the left, no appreciable crackles, expiratory wheezes noted, but seem to be coming from upper airway.   GASTROINTESTINAL:  Abdomen soft, non-distended. BS auscultated in all four quadrants. Negative for tenderness to palpation.  No masses or organomegaly noted.  MUSCULOSKELETAL: No gross deformities noted. Normal muscle tone.   HEMATOLOGIC/LYMPHATIC/IMMUNOLOGIC: Negative for lower extremity edema, bilaterally.  NEUROLOGIC: Alert and oriented to person, place, and time.  strength intact. No focal neuro deficits.   SKIN: Warm, dry, intact. RLE with wound vac in place.     Data   Results for orders placed or performed during the hospital encounter of 03/21/22 (from the past 24 hour(s))   Glucose by meter   Result Value Ref Range    GLUCOSE BY METER POCT 94 70 - 99 mg/dL   Creatinine    Result Value Ref Range    Creatinine 1.07 (H) 0.52 - 1.04 mg/dL    GFR Estimate 54 (L) >60 mL/min/1.73m2   Glucose by meter   Result Value Ref Range    GLUCOSE BY METER POCT 111 (H) 70 - 99 mg/dL   Lactic acid whole blood   Result Value Ref Range    Lactic Acid 1.3 0.7 - 2.0 mmol/L   Basic metabolic panel   Result Value Ref Range    Sodium 132 (L) 133 - 144 mmol/L    Potassium 5.3 3.4 - 5.3 mmol/L    Chloride 102 94 - 109 mmol/L    Carbon Dioxide (CO2) 28 20 - 32 mmol/L    Anion Gap 2 (L) 3 - 14 mmol/L    Urea Nitrogen 32 (H) 7 - 30 mg/dL    Creatinine 1.04 0.52 - 1.04 mg/dL    Calcium 9.2 8.5 - 10.1 mg/dL    Glucose 130 (H) 70 - 99 mg/dL    GFR Estimate 56 (L) >60 mL/min/1.73m2   CBC with platelets   Result Value Ref Range    WBC Count 18.3 (H) 4.0 - 11.0 10e3/uL    RBC Count 3.23 (L) 3.80 - 5.20 10e6/uL    Hemoglobin 8.3 (L) 11.7 - 15.7 g/dL    Hematocrit 28.8 (L) 35.0 - 47.0 %    MCV 89 78 - 100 fL    MCH 25.7 (L) 26.5 - 33.0 pg    MCHC 28.8 (L) 31.5 - 36.5 g/dL    RDW 15.1 (H) 10.0 - 15.0 %    Platelet Count 436 150 - 450 10e3/uL   Procalcitonin   Result Value Ref Range    Procalcitonin 0.17 (H) <0.05 ng/mL   Nt probnp inpatient   Result Value Ref Range    N terminal Pro BNP Inpatient 373 0 - 900 pg/mL   XR Chest Port 1 View    Narrative    CHEST ONE VIEW PORTABLE   3/23/2022 4:32 PM     HISTORY:  Lethargy.    COMPARISON: None.      Impression    IMPRESSION: Shallow inspiration accentuates heart size and pulmonary  vascularity. Patchy airspace opacities in the left mid and lower lung  are suspicious for pneumonia. No pneumothorax. Degenerative changes  right shoulder.    EFREN MONDRAGON MD         SYSTEM ID:  [id number]   EKG 12-lead, tracing only   Result Value Ref Range    Systolic Blood Pressure  mmHg    Diastolic Blood Pressure  mmHg    Ventricular Rate 95 BPM    Atrial Rate 95 BPM    CT Interval 130 ms    QRS Duration 82 ms     ms    QTc 397 ms    P Axis 48 degrees    R AXIS 6 degrees    T Axis 48  degrees    Interpretation ECG       Sinus rhythm  Normal ECG  When compared with ECG of 23-JUN-2016 14:57,  Vent. rate has increased BY  37 BPM  Nonspecific T wave abnormality no longer evident in Inferior leads  QT has shortened     UA reflex to Microscopic and Culture    Specimen: Urine, Amato Catheter   Result Value Ref Range    Color Urine Yellow Colorless, Straw, Light Yellow, Yellow    Appearance Urine Clear Clear    Glucose Urine Negative Negative mg/dL    Bilirubin Urine Negative Negative    Ketones Urine Trace (A) Negative mg/dL    Specific Gravity Urine 1.027 1.003 - 1.035    Blood Urine Small (A) Negative    pH Urine 6.0 5.0 - 7.0    Protein Albumin Urine 50  (A) Negative mg/dL    Urobilinogen Urine Normal Normal, 2.0 mg/dL    Nitrite Urine Negative Negative    Leukocyte Esterase Urine Trace (A) Negative    Mucus Urine Present (A) None Seen /LPF    RBC Urine 133 (H) <=2 /HPF    WBC Urine 7 (H) <=5 /HPF    Hyaline Casts Urine 1 <=2 /LPF    Narrative    Urine Culture not indicated   Hepatic panel   Result Value Ref Range    Bilirubin Total 0.2 0.2 - 1.3 mg/dL    Bilirubin Direct <0.1 0.0 - 0.2 mg/dL    Protein Total 5.8 (L) 6.8 - 8.8 g/dL    Albumin 2.0 (L) 3.4 - 5.0 g/dL    Alkaline Phosphatase 156 (H) 40 - 150 U/L    AST 63 (H) 0 - 45 U/L    ALT 29 0 - 50 U/L   Blood gas venous   Result Value Ref Range    pH Venous 7.33 7.32 - 7.43    pCO2 Venous 54 (H) 40 - 50 mm Hg    pO2 Venous 57 (H) 25 - 47 mm Hg    Bicarbonate Venous 28 21 - 28 mmol/L    Base Excess/Deficit (+/-) 1.9 -7.7 - 1.9 mmol/L    FIO2 21

## 2022-03-23 NOTE — PROGRESS NOTES
Examined patient, discussed with hospitalist who is bedside.  Patient is alert, oriented x3, labs pending, bolus complete, naloxone given.  Left pneumonia.  Will be transferred to stepdown unit for more intensive monitoring through the evening.  Initiated antibiotics for pneumonia.  May be related to an aspiration event though no clinical evidence of this.  She denies history of pulmonary embolus or deep venous thromboses.  We will cancel surgery for 7:30 AM tomorrow with planned neck surgical procedure on March 28.  Continue hypochlorous acid Vashe irrigation of sponges on right lower extremity venous hypertensive ulceration.  Ara

## 2022-03-23 NOTE — CONSULTS
[facility]  Consult Note - Hospitalist Service  Date of Admission:  3/21/2022  Consult Requested by: Dr. Bullock  Reason for Consult: Decreased responsiveness, fever     Assessment & Plan   Elizabeth Kelley is a 76 year old female with PMHx MS, chronic BLE lymphedema with venous insufficiency and chronic R leg cellulitis, GERD, hyperlipidemia, and depression who underwent previously scheduled excisional debridement of RLE cicumferential venous hypertensive ulceration and application of wound vac on 3/21/22. Hospitalist service re-consulted 3/23/22 for decreased responsiveness, meeting sepsis criteria with suspected pneumonia as source.     Sepsis secondary to CAP vs aspiration pneumonia  Acute hypoxic respiratory failure secondary to above  Hx of MRSA: Tmax 101.5, tachycardic in the 110s, hypoxic to 86% on room air. WBC 18.3, procal 0.17. Lactic 1.3. UA with trace LE, WBC 7. . VBG 7.33/54/57/28. CXR with patchy airspace opacities in the left mid and lower lung, suspicious for pneumonia.  Highest suspicion for pneumonia given the above, though note hx of recurrent RLE cellulitis with procedure as above. Hx of MRSA. RLE with wound vac in place.   - Move to [facility] [place] on [place]   - Volume resucitated to 3660 ccs per sepsis protocol   - Started IV Zosyn + Vancomycin (received 2 doses of Ancef devon and post-op)  - Blood cultures ordered and pending   - RCAT consulted, encourage pulmonary toilet with IS and acapella   - Duonebs q4 hrs while awake, PRN albuterol nebs   - Ween oxygen as above   - SLP consult for concern for aspiration pneumonia, pt does not recall any specific events, no hx of dysphagia.     Encephalopathy, multifactorial: Altered mentation this afternoon noted by nursing. Multifactorial in the setting of fever, narcotic pain medications, possible dehydration. Received dose of narcan, but did not significantly change pts mentation immediately, though seemed to improve within the hour  "after IVF bolus was near completion. Oriented X2 initially on my exam, drifting to sleep midway through conversation, but easily arousable. Mentation improved upon reevaluation within 30 minutes.   - Treat infection as above   - Judicious use or narcotics   - Continue PTA Lyrica 100 mg BID, but low threshold to hold for sedation    Acute normocytic anemia: Baseline 10-11. Down to 8.3 post-procedure. No active bleeding. Could be dilutional as checked while receiving IVF bolus   - Monitor  - Hold PTA ASA 81 mg po every day   - Hold/discontinue NSAIDs (scheduled celebrex and Toradol)      Recent Labs   Lab 03/23/22  1626   HGB 8.3*     Chronic BLE lymphedema with venous insufficiency and chronic R leg cellulitis s/p excisional debridement of RLE cicumferential venous hypertensive ulceration and application of wound vac (3/21/22)  - Defer routine post-operative cares to Dr. Bullock   - Plan for another trip to the OR once medically optimized   - Holding PTA Lasix and potassium supplementation while receiving IVF for sepsis   - Continue PTA Lyrica 100 mg BID, but hold for sedation    Severe Obesity: Estimated body mass index is 50.81 kg/m  as calculated from the following:    Height as of this encounter: 1.549 m (5' 1\").    Weight as of this encounter: 122 kg (268 lb 14.4 oz).      MS: Ambulatory at baseline, though some weakness in lower extremities and intermittent upper extremity weakness.     GERD: Continue Protonix   Hyperlipidemia: Continue statin   Depression: Continue Celexa, Wellbutrin  RLS: Continue PTA Requip      The patient's care was discussed with the Attending Physician, Dr. Hunter, Bedside Nurse, Patient, Patient's Family and Primary team. Spoke with Dr. Bullock and pt's daughter, Carolyn, at bedside.     50 minutes of critical care time spent on this case including expediting care, chart review, bedside assessment. 50% spent in conversation with primary team and family.      Светлана Velasquez, " "CAITY  [facility]  Securely message with the [employer] Web Console (learn more here)  Text page via [employer] Paging/Directory     Hospitalist Service  ______________________________________________________________________    Chief Complaint   Decreased level of responsiveness    History is obtained from the patient and chart review.     History of Present Illness   Elizabeth Kelley is a 76 year old female with PMHx MS, chronic BLE lymphedema with venous insufficiency and chronic R leg cellulitis, GERD, hyperlipidemia, and depression who underwent previously scheduled excisional debridement of RLE cicumferential venous hypertensive ulceration and application of wound vac on 3/21/22. Hospitalist service re-consulted 3/23/22 for decreased responsiveness, meeting sepsis criteria with suspected pneumonia as source.     STAT consulted for decreased level of responsiveness, fever, low urine output. Tmax 101.5, tachycardic in the 110s, hypoxic to 86% on room air. WBC 18.3, procal 0.17. Lactic 1.3. UA with trace LE, WBC 7. . VBG 7.33/54/57/28. CXR with patchy airspace opacities in the left mid and lower lung, suspicious for pneumonia.  Highest suspicion for pneumonia given the above, though note hx of recurrent RLE cellulitis with procedure as above. Hx of MRSA. RLE with wound vac in place.     Altered mentation this afternoon noted by nursing. Multifactorial in the setting of fever, narcotic pain medications (though hadn't received since the AM), possible dehydration. Received dose of narcan, but did not significantly change pts mentation immediately, though seemed to improve within the hour after IVF bolus was near completion. Oriented X2 initially on my exam, drifting to sleep midway through conversation, but easily arousable. Mentation improved upon reevaluation within 30 minutes.     Daughter reports hx of \"walking pneumonia.\" Reviewed last [facility] in [facility] for RLE cellulitis " for which pt was treated with Vanc + Zosyn and ultimately transitioned to Augmentin + doxy at discharge. No abdominal sx.     Review of Systems   The 10 point Review of Systems is negative other than noted in the HPI.    Past Medical History    I have reviewed this patient's medical history and updated it with pertinent information if needed.   Past Medical History:   Diagnosis Date     Ankle contracture, left      Class 3 severe obesity due to excess calories without serious comorbidity with body mass index (BMI) of 45.0 to 49.9 in adult (H)      Combined gastric and duodenal ulcer      Controlled substance agreement broken      Depression      Dyslipidemia      Edema      Edema      Gait abnormality      GERD (gastroesophageal reflux disease)      Gout      Lymphedema of both lower extremities      Melanoma (H)     left upper arm     MS (multiple sclerosis) (H)      RLS (restless legs syndrome)      Skin ulcer of left lower leg (H)      Valgus deformity of both feet      Vitamin D deficiency      Past Surgical History   I have reviewed this patient's surgical history and updated it with pertinent information if needed.  Past Surgical History:   Procedure Laterality Date     ABLATION SAPHENOUS VEIN W/ RFA Right 04/12/2021    Saphenous vein, anterior accessory saphenous vein, sclerotherapy of multiple veins.     COSMETIC SURGERY  1988    reduction of excess skin from weight loss     ESOPHAGOSCOPY, GASTROSCOPY, DUODENOSCOPY (EGD), COMBINED N/A 03/30/2015    Procedure: UPPER ENDOSCOPY with gastric biopsy;  Surgeon: Checo Fletcher MD;  Location: [facility];  Service:      IRRIGATION AND DEBRIDEMENT LOWER EXTREMITY, COMBINED Right 3/21/2022    Procedure: RIGHT LEG WOUND DEBRIDEMENT, PAULIE DERMATONE, MISONIX, VAC VERA FLOW WITH VASHE;  Surgeon: Gabriele Bullock MD;  Location: [facility] OR     MAMMOPLASTY REDUCTION Bilateral      MOHS MICROGRAPHIC PROCEDURE      left upper arm, melanoma     PICC SINGLE LUMEN PLACEMENT   2021          PICC SINGLE LUMEN PLACEMENT  2021          SPINE SURGERY      L5 area surgery, decompression     Social History   I have reviewed this patient's social history and updated it with pertinent information if needed.  Social History     Tobacco Use     Smoking status: Former Smoker     Packs/day: 0.25     Years: 12.00     Pack years: 3.00     Types: Cigarettes     Quit date: 1975     Years since quittin.2     Smokeless tobacco: Never Used     Tobacco comment: quit more than 30 years ago   Substance Use Topics     Alcohol use: Yes     Alcohol/week: 1.0 standard drink     Drug use: No     Family History   I have reviewed this patient's family history and updated it with pertinent information if needed.  Family History   Problem Relation Age of Onset     Uterine Cancer Mother      Hyperlipidemia Mother      Hypertension Mother      Multiple Sclerosis Mother      Asthma Sister      Obesity Sister      Hyperlipidemia Sister      Hypertension Sister      Multiple Sclerosis Sister      Arthritis Sister      Colon Cancer Paternal Aunt      Breast Cancer Paternal Aunt      Hypertension Paternal Aunt      Hyperlipidemia Paternal Aunt      Brain Cancer Father      Arthritis Maternal Uncle      Arthritis Maternal Grandmother      Early Death Maternal Grandfather      Arthritis Paternal Grandmother      Arthritis Paternal Grandfather        Medications   Medications Prior to Admission   Medication Sig Dispense Refill Last Dose     aspirin 81 mg chewable tablet Take 81 mg by mouth every evening    3/11/2022 at PM     Bioflavonoid Products (CINDY-C) 500-550 MG TABS Take 1 tablet by mouth 2 times daily 60 tablet 3 3/20/2022 at PM     buPROPion (WELLBUTRIN SR) 150 MG 12 hr tablet Take 150 mg by mouth every morning    3/20/2022 at AM     citalopram (CELEXA) 40 MG tablet Take 40 mg by mouth every morning    3/20/2022 at AM     cyanocobalamin (VITAMIN B-12) 1000 MCG tablet Take 1 tablet (1,000 mcg) by  "mouth daily 60 tablet 3 3/20/2022 at AM     furosemide (LASIX) 20 MG tablet Take 10 mg by mouth 2 times daily AM and afternoon (4PM)   3/20/2022 at pm     HYDROcodone-acetaminophen (NORCO) 5-325 MG tablet TAKE 1 TABLET BY MOUTH EVERY 8 HOURS AS NEEDED FOR 4 DAYS   Past Month at PRN     multivitamin w/minerals (CENTRUM ADULTS) tablet Take 1 tablet by mouth daily    3/20/2022 at AM     naproxen sodium (ALEVE) 220 MG tablet Take 440 mg by mouth daily as needed (220MG X 2 = 440MG)   3/18/2022 at PRN     Nutritional Supplements (VARICOSE VEINS FORMULA OR) Take 1 tablet by mouth every morning   3/20/2022 at AM     pantoprazole (PROTONIX) 40 MG tablet [PANTOPRAZOLE (PROTONIX) 40 MG TABLET] Take 40 mg by mouth daily.   3/20/2022 at AM     potassium chloride ER (KLOR-CON M) 20 MEQ CR tablet Take 40 mEq by mouth every morning    3/20/2022 at am     potassium chloride ER (KLOR-CON M) 20 MEQ CR tablet Take 20 mEq by mouth every evening (TAKE IN ADDITION TO AM/NOON DOSE FOR TOTAL 30mEq DAILY)   3/20/2022 at PM     pregabalin (LYRICA) 100 MG capsule Take 100 mg by mouth 2 times daily    3/20/2022 at PM     rOPINIRole (REQUIP) 1 MG tablet Take 0.5 mg by mouth 2 times daily IN THE MORNING AND AT NOON  (1MG X 0.5 = 0.5MG)  (TAKE IN ADDITION TO PM DOSE FOR TOTAL 2MG DAILY)   3/20/2022 at 1200     rOPINIRole (REQUIP) 1 MG tablet Take 1 mg by mouth At Bedtime (TAKE IN ADDITION TO AM/NOON DOSE FOR TOTAL 2MG DAILY)   3/20/2022 at PM     simvastatin (ZOCOR) 40 MG tablet [SIMVASTATIN (ZOCOR) 40 MG TABLET] Take 40 mg by mouth bedtime.   3/20/2022 at PM     Skin Protectants, Misc. (INTERDRY 10\"X36\") SHEE Externally apply 7 Units topically once as needed (change daily in groin rash/fold) 7 each 3      spironolactone (ALDACTONE) 25 MG tablet [SPIRONOLACTONE (ALDACTONE) 25 MG TABLET] Take 25 mg by mouth daily.   3/20/2022 at AM     vitamin B-Complex Take 1 tablet by mouth daily 60 tablet 3 3/20/2022 at AM     vitamin D3 (CHOLECALCIFEROL) 250 " mcg (12710 units) capsule Take 1 capsule (250 mcg) by mouth daily 60 capsule 3 3/20/2022 at AM       Allergies   Allergies   Allergen Reactions     Other Environmental Allergy Anaphylaxis     Bees/Wasps Stings     Adhesive Tape Unknown     Adhesive [Mecrylate] Unknown     Other reaction(s): sores     Percocet [Oxycodone-Acetaminophen] Rash     Vicodin [Hydrocodone-Acetaminophen] Nausea and Vomiting and Hives       Physical Exam   Vital Signs: Temp: (!) 100.6  F (38.1  C) Temp src: Oral BP: 138/60 Pulse: 95   Resp: (!) 39 SpO2: 93 % O2 Device: Nasal cannula Oxygen Delivery: 3 LPM  Weight: 268 lbs 14.4 oz    CONSTITUTIONAL: Pt laying in bed, dressed in hospital garb. Appears somnolent, arouses to voice, but drifts to sleep mid conversation. More alert on recheck 45 min later, oriented X3. Cooperative with interview.   HEENT: Normocephalic, atraumatic.   CARDIOVASCULAR: Regular rhythm, tachycardic. No murmurs, rubs, or extra heart sounds appreciated. Pulses +2/4 and regular in upper and lower extremities, bilaterally.   RESPIRATORY: No increased work of breathing.  Supplemental oxygenation via NC at 3 LPM. Diminished lung sounds on the left, no appreciable crackles, expiratory wheezes noted, but seem to be coming from upper airway.   GASTROINTESTINAL:  Abdomen soft, non-distended. BS auscultated in all four quadrants. Negative for tenderness to palpation.  No masses or organomegaly noted.  MUSCULOSKELETAL: No gross deformities noted. Normal muscle tone.   HEMATOLOGIC/LYMPHATIC/IMMUNOLOGIC: Negative for lower extremity edema, bilaterally.  NEUROLOGIC: Alert and oriented to person, place, and time.  strength intact. No focal neuro deficits.   SKIN: Warm, dry, intact. RLE with wound vac in place.     Data   Results for orders placed or performed during the hospital encounter of 03/21/22 (from the past 24 hour(s))   Glucose by meter   Result Value Ref Range    GLUCOSE BY METER POCT 94 70 - 99 mg/dL   Creatinine    Result Value Ref Range    Creatinine 1.07 (H) 0.52 - 1.04 mg/dL    GFR Estimate 54 (L) >60 mL/min/1.73m2   Glucose by meter   Result Value Ref Range    GLUCOSE BY METER POCT 111 (H) 70 - 99 mg/dL   Lactic acid whole blood   Result Value Ref Range    Lactic Acid 1.3 0.7 - 2.0 mmol/L   Basic metabolic panel   Result Value Ref Range    Sodium 132 (L) 133 - 144 mmol/L    Potassium 5.3 3.4 - 5.3 mmol/L    Chloride 102 94 - 109 mmol/L    Carbon Dioxide (CO2) 28 20 - 32 mmol/L    Anion Gap 2 (L) 3 - 14 mmol/L    Urea Nitrogen 32 (H) 7 - 30 mg/dL    Creatinine 1.04 0.52 - 1.04 mg/dL    Calcium 9.2 8.5 - 10.1 mg/dL    Glucose 130 (H) 70 - 99 mg/dL    GFR Estimate 56 (L) >60 mL/min/1.73m2   CBC with platelets   Result Value Ref Range    WBC Count 18.3 (H) 4.0 - 11.0 10e3/uL    RBC Count 3.23 (L) 3.80 - 5.20 10e6/uL    Hemoglobin 8.3 (L) 11.7 - 15.7 g/dL    Hematocrit 28.8 (L) 35.0 - 47.0 %    MCV 89 78 - 100 fL    MCH 25.7 (L) 26.5 - 33.0 pg    MCHC 28.8 (L) 31.5 - 36.5 g/dL    RDW 15.1 (H) 10.0 - 15.0 %    Platelet Count 436 150 - 450 10e3/uL   Procalcitonin   Result Value Ref Range    Procalcitonin 0.17 (H) <0.05 ng/mL   Nt probnp inpatient   Result Value Ref Range    N terminal Pro BNP Inpatient 373 0 - 900 pg/mL   XR Chest Port 1 View    Narrative    CHEST ONE VIEW PORTABLE   3/23/2022 4:32 PM     HISTORY:  Lethargy.    COMPARISON: None.      Impression    IMPRESSION: Shallow inspiration accentuates heart size and pulmonary  vascularity. Patchy airspace opacities in the left mid and lower lung  are suspicious for pneumonia. No pneumothorax. Degenerative changes  right shoulder.    EFREN MONDRAGON MD         SYSTEM ID:  [id number]   EKG 12-lead, tracing only   Result Value Ref Range    Systolic Blood Pressure  mmHg    Diastolic Blood Pressure  mmHg    Ventricular Rate 95 BPM    Atrial Rate 95 BPM    MA Interval 130 ms    QRS Duration 82 ms     ms    QTc 397 ms    P Axis 48 degrees    R AXIS 6 degrees    T Axis 48  degrees    Interpretation ECG       Sinus rhythm  Normal ECG  When compared with ECG of 23-JUN-2016 14:57,  Vent. rate has increased BY  37 BPM  Nonspecific T wave abnormality no longer evident in Inferior leads  QT has shortened     UA reflex to Microscopic and Culture    Specimen: Urine, Amato Catheter   Result Value Ref Range    Color Urine Yellow Colorless, Straw, Light Yellow, Yellow    Appearance Urine Clear Clear    Glucose Urine Negative Negative mg/dL    Bilirubin Urine Negative Negative    Ketones Urine Trace (A) Negative mg/dL    Specific Gravity Urine 1.027 1.003 - 1.035    Blood Urine Small (A) Negative    pH Urine 6.0 5.0 - 7.0    Protein Albumin Urine 50  (A) Negative mg/dL    Urobilinogen Urine Normal Normal, 2.0 mg/dL    Nitrite Urine Negative Negative    Leukocyte Esterase Urine Trace (A) Negative    Mucus Urine Present (A) None Seen /LPF    RBC Urine 133 (H) <=2 /HPF    WBC Urine 7 (H) <=5 /HPF    Hyaline Casts Urine 1 <=2 /LPF    Narrative    Urine Culture not indicated   Hepatic panel   Result Value Ref Range    Bilirubin Total 0.2 0.2 - 1.3 mg/dL    Bilirubin Direct <0.1 0.0 - 0.2 mg/dL    Protein Total 5.8 (L) 6.8 - 8.8 g/dL    Albumin 2.0 (L) 3.4 - 5.0 g/dL    Alkaline Phosphatase 156 (H) 40 - 150 U/L    AST 63 (H) 0 - 45 U/L    ALT 29 0 - 50 U/L   Blood gas venous   Result Value Ref Range    pH Venous 7.33 7.32 - 7.43    pCO2 Venous 54 (H) 40 - 50 mm Hg    pO2 Venous 57 (H) 25 - 47 mm Hg    Bicarbonate Venous 28 21 - 28 mmol/L    Base Excess/Deficit (+/-) 1.9 -7.7 - 1.9 mmol/L    FIO2 21

## 2022-03-23 NOTE — PROGRESS NOTES
Plan return to the operating room on March 24 for vera flow VAC sponge changes, inspection of wound, reapplication of vera flow with utilization of hypochlorous acid Vashe with anticipated split-thickness skin grafting and negative pressure wound therapy application on March 28.  Discussed reviewed with patient.  NPO after midnight  Ara

## 2022-03-23 NOTE — PHARMACY-VANCOMYCIN DOSING SERVICE
"Pharmacy Vancomycin Initial Note  Date of Service 2022  Patient's  1945  76 year old, female    Indication: Skin and Soft Tissue Infection    Current estimated CrCl = Estimated Creatinine Clearance: 58.1 mL/min (based on SCr of 1.04 mg/dL).    Creatinine for last 3 days  3/23/2022:  6:43 AM Creatinine 1.07 mg/dL;  4:26 PM Creatinine 1.04 mg/dL    Recent Vancomycin Level(s) for last 3 days  No results found for requested labs within last 72 hours.      Vancomycin IV Administrations (past 72 hours)      No vancomycin orders with administrations in past 72 hours.                Nephrotoxins and other renal medications (From now, onward)    Start     Dose/Rate Route Frequency Ordered Stop    22 0900  naproxen (NAPROSYN) tablet 500 mg         500 mg Oral 2 TIMES DAILY WITH MEALS 22 1547      22 1900  vancomycin 1500 mg in 0.9% NaCl 250 ml intermittent infusion 1,500 mg         1,500 mg  over 90 Minutes Intravenous EVERY 24 HOURS 22 1722      22 1700  piperacillin-tazobactam (ZOSYN) 3.375 g vial to attach to  mL bag        Note to Pharmacy: For [facility], [facility] and [facility]: For Zosyn-naive patients, use the \"Zosyn initial dose + extended infusion\" order panel.    3.375 g  over 30 Minutes Intravenous EVERY 6 HOURS 22 1654      22 1630  [Held by provider]  furosemide (LASIX) half-tab 10 mg        (Held by provider since Wed 3/23/2022 at 1701 by Светлана Velasquez PA-C.Hold Reason: Other.Hold Comments: Sepsis)    10 mg Oral 2 TIMES DAILY 22 1547      22 1547  ketorolac (TORADOL) injection 15 mg         15 mg Intravenous EVERY 6 HOURS PRN 22 1547 22 1546          Contrast Orders - past 72 hours (72h ago, onward)            None          [employer] Prediction of Planned Initial Vancomycin Regimen  Loading dose: N/A  Regimen: 1500 mg IV every 24 hours.  Start time: 13:00 on 10/22/2021  Exposure target: AUC24 (range)400-600 mg/L.hr   AUC24,ss: " 559 mg/L.hr  Probability of AUC24 > 400: 98 %  Ctrough,ss: 15.3 mg/L  Probability of Ctrough,ss > 20: 1 %  Probability of nephrotoxicity (Lodise LOUIS 2009): 11 %          Plan:  1. Start vancomycin  1500 mg IV q 24 h.   2. Vancomycin monitoring method: AUC  3. Vancomycin therapeutic monitoring goal: 400-600 mg*h/L  4. Pharmacy will check vancomycin levels as appropriate in 1-3 Days.    5. Serum creatinine levels will be ordered daily for the first week of therapy and at least twice weekly for subsequent weeks.      Christina Maurice, [facility]

## 2022-03-23 NOTE — PLAN OF CARE
Goal Outcome Evaluation:    Plan of Care Reviewed With: patient, daughter   Paged Dr Bullock when pt continued to be lethargic and unable to answer questions appropriately, pt also had temp o f101.7.  Orders for narcan, chest x-ray, EKG, and labs were placed, hospitalist consult was place, pt lungs diminished, wheezes noted, O2 needs increased to 3lNC, bolus started, Dr Bullock and hospitalist PA both here to see pt, pt will transfer to [facility], pt slightly more awake, antibiotics started

## 2022-03-23 NOTE — PROGRESS NOTES
STAT re-consult for increased lethargy. I went and assessed pt at bedside. Intermittently arouses and able to participate in conversation, but drifts to sleep mid-convo. Tmax 101.5 F, tachy in the 110s. Concern for sepsis. Fortunately BP stable 130s/70s. WBC 18.3. Lactic 1.3. Noted increased wheezes and course lung sounds. CXR with patchy airspace opacities in left mid and lower lung, suspicious for pneumonia. Note hx of RLE cellulitis and MRSA. No known aspiration events.   - 1 L bolus ordered STAT by Dr. Bullock, will continue fluid bolus for total of 3660 ccs per sepsis protocol given low urine output. Note few softer BPs in the 100-110s, but most recent check as above with bolus going. Monitor BP closely. Monitor for evidence of volume overload and alert hospitalist as fluids will need to be adjusted (BNP ~300)  - Dose of narcan trialed, but did not change pt's mental status   - Follow-up STAT CMP, procal, VBG  - Blood cultures ordered   - Start IV Vanc + Zosyn (received 2 doses of Ancef devon and post-op   - RCAT consulted, encourage pulmonary toilet, IS and acapella. Duonebs q4hrs while awake, PRN albuterol nebs   - Check UA.   - RLE with wound vac in place     Formal consult note to follow. Attempted call to daughter, Carolyn, got voicemail. Will call again shortly. Asked RN to attempt to call as well.

## 2022-03-24 ENCOUNTER — APPOINTMENT (OUTPATIENT)
Dept: SPEECH THERAPY | Facility: CLINIC | Age: 77
DRG: 264 | End: 2022-03-24
Attending: PHYSICIAN ASSISTANT
Payer: COMMERCIAL

## 2022-03-24 ENCOUNTER — APPOINTMENT (OUTPATIENT)
Dept: SURGERY | Facility: PHYSICIAN GROUP | Age: 77
End: 2022-03-24
Payer: COMMERCIAL

## 2022-03-24 ENCOUNTER — APPOINTMENT (OUTPATIENT)
Dept: GENERAL RADIOLOGY | Facility: CLINIC | Age: 77
DRG: 264 | End: 2022-03-24
Attending: NURSE PRACTITIONER
Payer: COMMERCIAL

## 2022-03-24 LAB
ANION GAP SERPL CALCULATED.3IONS-SCNC: 4 MMOL/L (ref 3–14)
ATRIAL RATE - MUSE: 78 BPM
BASE EXCESS BLDV CALC-SCNC: 2.2 MMOL/L (ref -7.7–1.9)
BASE EXCESS BLDV CALC-SCNC: 2.4 MMOL/L (ref -7.7–1.9)
BASE EXCESS BLDV CALC-SCNC: 4 MMOL/L (ref -7.7–1.9)
BASOPHILS # BLD AUTO: 0 10E3/UL (ref 0–0.2)
BASOPHILS NFR BLD AUTO: 0 %
BUN SERPL-MCNC: 31 MG/DL (ref 7–30)
CALCIUM SERPL-MCNC: 8.7 MG/DL (ref 8.5–10.1)
CHLORIDE BLD-SCNC: 104 MMOL/L (ref 94–109)
CO2 SERPL-SCNC: 26 MMOL/L (ref 20–32)
CREAT SERPL-MCNC: 0.95 MG/DL (ref 0.52–1.04)
CREAT SERPL-MCNC: 0.99 MG/DL (ref 0.52–1.04)
DIASTOLIC BLOOD PRESSURE - MUSE: NORMAL MMHG
EOSINOPHIL # BLD AUTO: 0.2 10E3/UL (ref 0–0.7)
EOSINOPHIL NFR BLD AUTO: 1 %
ERYTHROCYTE [DISTWIDTH] IN BLOOD BY AUTOMATED COUNT: 15.3 % (ref 10–15)
GFR SERPL CREATININE-BSD FRML MDRD: 60 ML/MIN/1.73M2
GFR SERPL CREATININE-BSD FRML MDRD: 63 ML/MIN/1.73M2
GLUCOSE BLD-MCNC: 154 MG/DL (ref 70–99)
GLUCOSE BLDC GLUCOMTR-MCNC: 120 MG/DL (ref 70–99)
HCO3 BLDV-SCNC: 29 MMOL/L (ref 21–28)
HCO3 BLDV-SCNC: 30 MMOL/L (ref 21–28)
HCO3 BLDV-SCNC: 31 MMOL/L (ref 21–28)
HCT VFR BLD AUTO: 21.1 % (ref 35–47)
HGB BLD-MCNC: 6.1 G/DL (ref 11.7–15.7)
HGB BLD-MCNC: 6.4 G/DL (ref 11.7–15.7)
HGB BLD-MCNC: 7.1 G/DL (ref 11.7–15.7)
IMM GRANULOCYTES # BLD: 0.1 10E3/UL
IMM GRANULOCYTES NFR BLD: 1 %
INTERPRETATION ECG - MUSE: NORMAL
LACTATE SERPL-SCNC: 1.1 MMOL/L (ref 0.7–2)
LYMPHOCYTES # BLD AUTO: 1.2 10E3/UL (ref 0.8–5.3)
LYMPHOCYTES NFR BLD AUTO: 10 %
MCH RBC QN AUTO: 25.6 PG (ref 26.5–33)
MCHC RBC AUTO-ENTMCNC: 28.9 G/DL (ref 31.5–36.5)
MCV RBC AUTO: 89 FL (ref 78–100)
MONOCYTES # BLD AUTO: 0.8 10E3/UL (ref 0–1.3)
MONOCYTES NFR BLD AUTO: 7 %
NEUTROPHILS # BLD AUTO: 9.6 10E3/UL (ref 1.6–8.3)
NEUTROPHILS NFR BLD AUTO: 81 %
NRBC # BLD AUTO: 0 10E3/UL
NRBC BLD AUTO-RTO: 0 /100
O2/TOTAL GAS SETTING VFR VENT: 2 %
O2/TOTAL GAS SETTING VFR VENT: 21 %
O2/TOTAL GAS SETTING VFR VENT: 30 %
OXYHGB MFR BLDV: 78 % (ref 70–75)
P AXIS - MUSE: 58 DEGREES
PCO2 BLDV: 59 MM HG (ref 40–50)
PCO2 BLDV: 62 MM HG (ref 40–50)
PCO2 BLDV: 66 MM HG (ref 40–50)
PH BLDV: 7.27 [PH] (ref 7.32–7.43)
PH BLDV: 7.3 [PH] (ref 7.32–7.43)
PH BLDV: 7.3 [PH] (ref 7.32–7.43)
PLATELET # BLD AUTO: 269 10E3/UL (ref 150–450)
PO2 BLDV: 31 MM HG (ref 25–47)
PO2 BLDV: 34 MM HG (ref 25–47)
PO2 BLDV: 46 MM HG (ref 25–47)
POTASSIUM BLD-SCNC: 4.2 MMOL/L (ref 3.4–5.3)
PR INTERVAL - MUSE: 146 MS
QRS DURATION - MUSE: 80 MS
QT - MUSE: 346 MS
QTC - MUSE: 394 MS
R AXIS - MUSE: 6 DEGREES
RBC # BLD AUTO: 2.38 10E6/UL (ref 3.8–5.2)
SODIUM SERPL-SCNC: 134 MMOL/L (ref 133–144)
SYSTOLIC BLOOD PRESSURE - MUSE: NORMAL MMHG
T AXIS - MUSE: 38 DEGREES
VENTRICULAR RATE- MUSE: 78 BPM
WBC # BLD AUTO: 11.9 10E3/UL (ref 4–11)

## 2022-03-24 PROCEDURE — 82947 ASSAY GLUCOSE BLOOD QUANT: CPT | Performed by: PHYSICIAN ASSISTANT

## 2022-03-24 PROCEDURE — 82803 BLOOD GASES ANY COMBINATION: CPT | Performed by: INTERNAL MEDICINE

## 2022-03-24 PROCEDURE — 250N000009 HC RX 250: Performed by: SURGERY

## 2022-03-24 PROCEDURE — 250N000011 HC RX IP 250 OP 636: Performed by: SURGERY

## 2022-03-24 PROCEDURE — 999N000157 HC STATISTIC RCP TIME EA 10 MIN

## 2022-03-24 PROCEDURE — 99233 SBSQ HOSP IP/OBS HIGH 50: CPT | Performed by: INTERNAL MEDICINE

## 2022-03-24 PROCEDURE — 85025 COMPLETE CBC W/AUTO DIFF WBC: CPT | Performed by: PHYSICIAN ASSISTANT

## 2022-03-24 PROCEDURE — 250N000013 HC RX MED GY IP 250 OP 250 PS 637: Performed by: PHYSICIAN ASSISTANT

## 2022-03-24 PROCEDURE — 71045 X-RAY EXAM CHEST 1 VIEW: CPT

## 2022-03-24 PROCEDURE — 258N000003 HC RX IP 258 OP 636

## 2022-03-24 PROCEDURE — 250N000013 HC RX MED GY IP 250 OP 250 PS 637: Performed by: SURGERY

## 2022-03-24 PROCEDURE — 82805 BLOOD GASES W/O2 SATURATION: CPT | Performed by: NURSE PRACTITIONER

## 2022-03-24 PROCEDURE — 94660 CPAP INITIATION&MGMT: CPT

## 2022-03-24 PROCEDURE — 85018 HEMOGLOBIN: CPT | Performed by: INTERNAL MEDICINE

## 2022-03-24 PROCEDURE — 36415 COLL VENOUS BLD VENIPUNCTURE: CPT | Performed by: PHYSICIAN ASSISTANT

## 2022-03-24 PROCEDURE — 83605 ASSAY OF LACTIC ACID: CPT | Performed by: SURGERY

## 2022-03-24 PROCEDURE — 250N000011 HC RX IP 250 OP 636

## 2022-03-24 PROCEDURE — 250N000011 HC RX IP 250 OP 636: Performed by: INTERNAL MEDICINE

## 2022-03-24 PROCEDURE — 94640 AIRWAY INHALATION TREATMENT: CPT

## 2022-03-24 PROCEDURE — 120N000013 HC R&B IMCU

## 2022-03-24 PROCEDURE — 82803 BLOOD GASES ANY COMBINATION: CPT | Performed by: SURGERY

## 2022-03-24 PROCEDURE — 82565 ASSAY OF CREATININE: CPT | Performed by: SURGERY

## 2022-03-24 PROCEDURE — 250N000013 HC RX MED GY IP 250 OP 250 PS 637: Performed by: INTERNAL MEDICINE

## 2022-03-24 PROCEDURE — 36415 COLL VENOUS BLD VENIPUNCTURE: CPT | Performed by: INTERNAL MEDICINE

## 2022-03-24 PROCEDURE — 250N000011 HC RX IP 250 OP 636: Performed by: PHYSICIAN ASSISTANT

## 2022-03-24 PROCEDURE — 250N000009 HC RX 250: Performed by: PHYSICIAN ASSISTANT

## 2022-03-24 PROCEDURE — 80048 BASIC METABOLIC PNL TOTAL CA: CPT | Performed by: SURGERY

## 2022-03-24 PROCEDURE — 36415 COLL VENOUS BLD VENIPUNCTURE: CPT | Performed by: NURSE PRACTITIONER

## 2022-03-24 PROCEDURE — 92610 EVALUATE SWALLOWING FUNCTION: CPT | Mod: GN

## 2022-03-24 PROCEDURE — 82374 ASSAY BLOOD CARBON DIOXIDE: CPT | Performed by: PHYSICIAN ASSISTANT

## 2022-03-24 PROCEDURE — 94640 AIRWAY INHALATION TREATMENT: CPT | Mod: 76

## 2022-03-24 PROCEDURE — 36415 COLL VENOUS BLD VENIPUNCTURE: CPT | Performed by: SURGERY

## 2022-03-24 RX ORDER — LIDOCAINE HYDROCHLORIDE 40 MG/ML
200 SOLUTION TOPICAL EVERY 4 HOURS PRN
Status: DISCONTINUED | OUTPATIENT
Start: 2022-03-24 | End: 2022-03-25

## 2022-03-24 RX ORDER — FUROSEMIDE 10 MG/ML
40 INJECTION INTRAMUSCULAR; INTRAVENOUS ONCE
Status: COMPLETED | OUTPATIENT
Start: 2022-03-24 | End: 2022-03-24

## 2022-03-24 RX ORDER — KETOROLAC TROMETHAMINE 15 MG/ML
15 INJECTION, SOLUTION INTRAMUSCULAR; INTRAVENOUS EVERY 6 HOURS PRN
Status: ACTIVE | OUTPATIENT
Start: 2022-03-24 | End: 2022-03-29

## 2022-03-24 RX ORDER — GABAPENTIN 300 MG/1
300 CAPSULE ORAL 3 TIMES DAILY
Status: DISCONTINUED | OUTPATIENT
Start: 2022-03-24 | End: 2022-04-05

## 2022-03-24 RX ORDER — FUROSEMIDE 20 MG/1
10 TABLET ORAL 2 TIMES DAILY
Status: DISCONTINUED | OUTPATIENT
Start: 2022-03-24 | End: 2022-04-04

## 2022-03-24 RX ORDER — CARBOXYMETHYLCELLULOSE SODIUM 5 MG/ML
1 SOLUTION/ DROPS OPHTHALMIC
Status: DISCONTINUED | OUTPATIENT
Start: 2022-03-24 | End: 2022-04-29 | Stop reason: HOSPADM

## 2022-03-24 RX ADMIN — CITALOPRAM HYDROBROMIDE 40 MG: 20 TABLET, FILM COATED ORAL at 09:30

## 2022-03-24 RX ADMIN — Medication 1 CAPSULE: at 09:29

## 2022-03-24 RX ADMIN — IPRATROPIUM BROMIDE AND ALBUTEROL SULFATE 3 ML: .5; 3 SOLUTION RESPIRATORY (INHALATION) at 23:10

## 2022-03-24 RX ADMIN — PREGABALIN 100 MG: 100 CAPSULE ORAL at 09:30

## 2022-03-24 RX ADMIN — IPRATROPIUM BROMIDE AND ALBUTEROL SULFATE 3 ML: .5; 3 SOLUTION RESPIRATORY (INHALATION) at 11:49

## 2022-03-24 RX ADMIN — PIPERACILLIN SODIUM AND TAZOBACTAM SODIUM 3.38 G: 3; .375 INJECTION, POWDER, LYOPHILIZED, FOR SOLUTION INTRAVENOUS at 06:13

## 2022-03-24 RX ADMIN — Medication 1 PACKET: at 22:36

## 2022-03-24 RX ADMIN — Medication 250 MCG: at 09:30

## 2022-03-24 RX ADMIN — LIDOCAINE HYDROCHLORIDE 200 ML: 40 SOLUTION TOPICAL at 11:54

## 2022-03-24 RX ADMIN — PIPERACILLIN SODIUM AND TAZOBACTAM SODIUM 3.38 G: 3; .375 INJECTION, POWDER, LYOPHILIZED, FOR SOLUTION INTRAVENOUS at 18:16

## 2022-03-24 RX ADMIN — MULTIPLE VITAMINS W/ MINERALS TAB 1 TABLET: TAB at 09:30

## 2022-03-24 RX ADMIN — ACETAMINOPHEN 650 MG: 325 TABLET, FILM COATED ORAL at 01:45

## 2022-03-24 RX ADMIN — FUROSEMIDE 10 MG: 20 TABLET ORAL at 18:17

## 2022-03-24 RX ADMIN — HYDROMORPHONE HYDROCHLORIDE 0.2 MG: 0.2 INJECTION, SOLUTION INTRAMUSCULAR; INTRAVENOUS; SUBCUTANEOUS at 12:32

## 2022-03-24 RX ADMIN — CYANOCOBALAMIN TAB 1000 MCG 1000 MCG: 1000 TAB at 09:30

## 2022-03-24 RX ADMIN — Medication 1 PACKET: at 10:51

## 2022-03-24 RX ADMIN — VANCOMYCIN HYDROCHLORIDE 1500 MG: 5 INJECTION, POWDER, LYOPHILIZED, FOR SOLUTION INTRAVENOUS at 20:34

## 2022-03-24 RX ADMIN — ROPINIROLE HYDROCHLORIDE 0.5 MG: 0.5 TABLET, FILM COATED ORAL at 12:48

## 2022-03-24 RX ADMIN — HYDROMORPHONE HYDROCHLORIDE 0.2 MG: 0.2 INJECTION, SOLUTION INTRAMUSCULAR; INTRAVENOUS; SUBCUTANEOUS at 04:03

## 2022-03-24 RX ADMIN — IPRATROPIUM BROMIDE AND ALBUTEROL SULFATE 3 ML: .5; 3 SOLUTION RESPIRATORY (INHALATION) at 07:34

## 2022-03-24 RX ADMIN — PANTOPRAZOLE SODIUM 40 MG: 40 TABLET, DELAYED RELEASE ORAL at 09:30

## 2022-03-24 RX ADMIN — PIPERACILLIN SODIUM AND TAZOBACTAM SODIUM 3.38 G: 3; .375 INJECTION, POWDER, LYOPHILIZED, FOR SOLUTION INTRAVENOUS at 11:54

## 2022-03-24 RX ADMIN — SIMVASTATIN 40 MG: 40 TABLET, FILM COATED ORAL at 22:23

## 2022-03-24 RX ADMIN — HYDROMORPHONE HYDROCHLORIDE 2 MG: 2 TABLET ORAL at 09:30

## 2022-03-24 RX ADMIN — GABAPENTIN 300 MG: 300 CAPSULE ORAL at 18:17

## 2022-03-24 RX ADMIN — GABAPENTIN 300 MG: 300 CAPSULE ORAL at 22:22

## 2022-03-24 RX ADMIN — FUROSEMIDE 40 MG: 10 INJECTION, SOLUTION INTRAVENOUS at 10:46

## 2022-03-24 RX ADMIN — PIPERACILLIN SODIUM AND TAZOBACTAM SODIUM 3.38 G: 3; .375 INJECTION, POWDER, LYOPHILIZED, FOR SOLUTION INTRAVENOUS at 23:54

## 2022-03-24 RX ADMIN — Medication 1 CAPSULE: at 22:35

## 2022-03-24 RX ADMIN — IPRATROPIUM BROMIDE AND ALBUTEROL SULFATE 3 ML: .5; 3 SOLUTION RESPIRATORY (INHALATION) at 15:53

## 2022-03-24 RX ADMIN — HYDROMORPHONE HYDROCHLORIDE 0.2 MG: 0.2 INJECTION, SOLUTION INTRAMUSCULAR; INTRAVENOUS; SUBCUTANEOUS at 11:25

## 2022-03-24 RX ADMIN — ROPINIROLE HYDROCHLORIDE 1 MG: 0.5 TABLET, FILM COATED ORAL at 22:23

## 2022-03-24 RX ADMIN — HYDROMORPHONE HYDROCHLORIDE 0.2 MG: 0.2 INJECTION, SOLUTION INTRAMUSCULAR; INTRAVENOUS; SUBCUTANEOUS at 06:55

## 2022-03-24 RX ADMIN — ROPINIROLE HYDROCHLORIDE 0.5 MG: 0.5 TABLET, FILM COATED ORAL at 09:29

## 2022-03-24 RX ADMIN — ACETAMINOPHEN 650 MG: 325 TABLET, FILM COATED ORAL at 22:22

## 2022-03-24 RX ADMIN — BUPROPION HYDROCHLORIDE 150 MG: 150 TABLET, EXTENDED RELEASE ORAL at 09:29

## 2022-03-24 RX ADMIN — B-COMPLEX W/ C & FOLIC ACID TAB 1 TABLET: TAB at 09:30

## 2022-03-24 RX ADMIN — IPRATROPIUM BROMIDE AND ALBUTEROL SULFATE 3 ML: .5; 3 SOLUTION RESPIRATORY (INHALATION) at 19:20

## 2022-03-24 RX ADMIN — HYDROMORPHONE HYDROCHLORIDE 2 MG: 2 TABLET ORAL at 12:48

## 2022-03-24 ASSESSMENT — ACTIVITIES OF DAILY LIVING (ADL)
ADLS_ACUITY_SCORE: 8

## 2022-03-24 NOTE — PROGRESS NOTES
03/24/22 0846   General Information   Onset of Illness/Injury or Date of Surgery 03/22/22   Referring Physician Светлана Velasquez PA-C   Patient/Family Therapy Goal Statement (SLP) Wants a diet order    Pertinent History of Current Problem Elizabeth Kelley is a 76 year old female with PMHx MS, chronic BLE lymphedema with venous insufficiency and chronic R leg cellulitis, GERD, hyperlipidemia, and depression who underwent previously scheduled excisional debridement of RLE cicumferential venous hypertensive ulceration and application of wound vac on 3/21/22. Hospitalist service re-consulted 3/23/22 for decreased responsiveness, meeting sepsis criteria with suspected pneumonia as source.    General Observations Pt is alert, agreeable to evaluation, and eager for PO.    Past History of Dysphagia None PTA per pt report and upon chart review   Type of Evaluation   Type of Evaluation Swallow Evaluation   Oral Motor   Oral Musculature generally intact   Structural Abnormalities none present   Mucosal Quality adequate   Dentition (Oral Motor)   Dentition (Oral Motor) adequate dentition   Facial Symmetry (Oral Motor)   Facial Symmetry (Oral Motor) WNL   Lip Function (Oral Motor)   Lip Range of Motion (Oral Motor) WNL   Tongue Function (Oral Motor)   Tongue ROM (Oral Motor) WNL   Vocal Quality/Secretion Management (Oral Motor)   Vocal Quality (Oral Motor) WFL  (pt reports mildly weak)   Secretion Management (Oral Motor) WNL   General Swallowing Observations   Current Diet/Method of Nutritional Intake (General Swallowing Observations, NIS) NPO   Respiratory Support (General Swallowing Observations) none   Swallowing Evaluation Clinical swallow evaluation   Clinical Swallow Evaluation   Feeding Assistance no assistance needed   Clinical Swallow Evaluation Textures Trialed pureed;solid foods;thin liquids   Clinical Swallow Eval: Thin Liquid Texture Trial   Mode of Presentation, Thin Liquids straw;self-fed   Volume of  Liquid or Food Presented 4 oz    Oral Phase of Swallow WFL   Pharyngeal Phase of Swallow intact   Diagnostic Statement No overt s/sx of aspiration, no changes in breath sounds or vocal quality    Clinical Swallow Evaluation: Puree Solid Texture Trial   Mode of Presentation, Puree spoon;self-fed   Volume of Puree Presented 4 oz    Oral Phase, Puree WFL   Pharyngeal Phase, Puree intact   Diagnostic Statement Adequate oral manipulation and clearance. No overt s/sx of aspiration    Clinical Swallow Evaluation: Solid Food Texture Trial   Mode of Presentation self-fed   Volume Presented 1 cracker   Oral Phase WFL   Pharyngeal Phase intact   Diagnostic Statement Adequate manipulation and clearance, no overt s/sx of aspiration    Esophageal Phase of Swallow   Patient reports or presents with symptoms of esophageal dysphagia No   Swallowing Recommendations   Diet Consistency Recommendations regular diet;thin liquids (level 0)   Supervision Level for Intake patient independent   Mode of Delivery Recommendations slow rate of intake   Swallowing Maneuver Recommendations alternate food and liquid intake   Recommended Feeding/Eating Techniques (Swallow Eval) maintain upright sitting position for eating;maintain upright posture during/after eating for 30 minutes;minimize distractions during oral intake   Medication Administration Recommendations, Swallowing (SLP) As per pt preference, pills a few at a time    Instrumental Assessment Recommendations instrumental evaluation not recommended at this time   Clinical Impression   Criteria for Skilled Therapeutic Interventions Met (SLP Eval) No problems identified which require skilled intervention   SLP Diagnosis Functional oropharyngeal swallow   Risks & Benefits of therapy have been explained evaluation/treatment results reviewed;participants included;participants voiced agreement with care plan;patient   Clinical Impression Comments Pt seen for clinical swallow evaluation. She  consumed thin liquids, puree, and regular textures without overt s/sx of aspiration, no changes in breath sounds or vocal quality. Pt denies any difficulty across trials. Swallow is timely, no notable oral residue, single swallows per bolus. No appreciable oropharyngeal dysphagia. Recommend regular diet and thin liquids with general safe swallow strategies (upright position, slow rate). Evaluation only, no further SLP services warranted.   SLP Discharge Planning   SLP Discharge Recommendation   (Defer to PT/OT recs)   SLP Rationale for DC Rec Evaluation only   SLP Brief overview of current status  No appreciable oropharyngeal dysphagia. Recommend regular diet and thin liquids with general safe swallow strategies (upright position, slow rate). Evaluation only, no further SLP services warranted.    Total Evaluation Time   Total Evaluation Time (Minutes) 12

## 2022-03-24 NOTE — PROGRESS NOTES
VAC vera flow removed at bedside, utilization of topical lidocaine.  Plan surgical evaluation on March 28, if adequate wound bed prep, split-thickness skin graft, if inadequate, debridement and will continue hypochlorous acid dressing changes until adequate granulation tissue.  Pain plan discussed with hospitalist.  Ara

## 2022-03-24 NOTE — PROGRESS NOTES
BRIEF HOUSE OFFICER NOTE:    Acute Hypoxic Respiratory Failure   I was asked by nursing staff to evaluate the patient for concerns of possible evolving volume overload.  Nursing staff report that the patient received 3.6 L IV fluid bolus per the sepsis protocol and following volume resuscitation has now had increased coughing, inspiratory and expiratory wheezing on exam, and complaints of increased work of breathing.  On my arrival bedside, the patient does have inspiratory and expiratory wheezes on exam with a frequent cough, orthopnea is present.  Repeat chest x-ray is not concerning for any acute signs of volume overload.  Due to patient increased work of breathing and overall clinical picture with encephalopathy that does appear to be improving but still present plan to trial BiPAP as tolerated.  Discontinue IV fluids.  VBG is pending.    LAXMI Alcantara CNP  Text Page

## 2022-03-24 NOTE — PROVIDER NOTIFICATION
Paged Dr. Meehan, hemoglobin is 6.1, although she got a lot of fluids overnight. No signs of bleeding right now. HR is 70's she is on room air but very HILL[place] and wheezy.

## 2022-03-24 NOTE — PLAN OF CARE
Goal Outcome Evaluation:    Pt. lethargic and disoriented to time and at beginning of shift, now AxO x4. 3660 ml bolus given per MD. Paged hospitalist for concern of fluid overload. Alligator NP saw pt, CXR and VBG obtained. Pt now on BiPap and saline locked. See house NP note for more details. Vital signs stable on 4 L/BiPap when sleeping. Low grade temp. Up with lift, turn/repo in bed Q2H. NPO except meds while on BiPap. Speech consult placed via hospitalist, concern for aspiration pneum. Swallowing pills with water without difficulty. Lung sounds diminished with expiratory wheezes. Bowel sounds +, + flatus. BM -, adequate urine output via garrison. Wound vac to RLE, continuous at 125 with vashe. Pain managed with tylenol and IV dilaudid x1, using narcotics sparingly due to lethargy. Denies nausea. Tele NSR.

## 2022-03-24 NOTE — PROGRESS NOTES
Assumed care of pt @1845. Settled pt, gave report to 1900 nurse. Wound vac patent, continuous 125. Temp elevated 102.7, tylenol given. Vanco and first liter of LR bolus hung. Ate dinner. Lm patent. Doppler for pulses. Here for lethargy, noted still lethargic, oriented x3-x4. Falling asleep and daughter present at bedside has noted pt is stuttering.

## 2022-03-24 NOTE — PROGRESS NOTES
[facility]    Medicine Progress Note - Hospitalist Service    Date of Admission:  3/21/2022    Assessment & Plan          Elizabeth Kelley is a 76 year old female with PMHx MS, chronic BLE lymphedema with venous insufficiency and chronic R leg cellulitis, GERD, hyperlipidemia, and depression who underwent previously scheduled excisional debridement of RLE cicumferential venous hypertensive ulceration and application of wound vac on 3/21/22. Hospitalist service re-consulted 3/23/22 for decreased responsiveness, meeting sepsis criteria with suspected pneumonia as source.      Sepsis secondary to CAP vs aspiration pneumonia  Acute hypoxic respiratory failure secondary to above  Hx of MRSA: Tmax 101.5, tachycardic in the 110s, hypoxic to 86% on room air. WBC 18.3, procal 0.17. Lactic 1.3. UA with trace LE, WBC 7. . VBG 7.33/54/57/28. CXR with patchy airspace opacities in the left mid and lower lung, suspicious for pneumonia.  Highest suspicion for pneumonia given the above, though note hx of recurrent RLE cellulitis with procedure as above. Hx of MRSA. RLE with wound vac in place.   - Moved to [facility] on 33   - Volume resucitated to 3660 ccs per sepsis protocol   - Started IV Zosyn + Vancomycin (received 2 doses of Ancef devon and post-op)  - Blood cultures ordered and pending   - RCAT consulted, encourage pulmonary toilet with IS and acapella   - Duonebs q4 hrs while awake, PRN albuterol nebs   - afebrile today, WBCs trending down 18.3--11.9  - was wheezy in am, possible some fluid overload  - Lasix 40 mg ivX1 given  - monitor fluid status  - resume PTA LAsix and Aldactone  - Wean oxygen as above   - SLP consult- no evidence of dysphagia     Encephalopathy, multifactorial:   Acute hypoxic and hypercapnic respiratory failure- multifactorial  - Altered mentation noted by nursing yesterday  - Multifactorial in the setting of fever, narcotic pain medications, suspected underlying BOLIVAR, possible  dehydration. Received dose of narcan, but did not significantly change pts mentation immediately, though seemed to improve within the hour after IVF bolus was near completion. Oriented X2 initially on my exam, drifting to sleep midway through conversation, but easily arousable. Mentation improved upon reevaluation within 30 minutes.   - Treat infection as above   - today was awake and alert in am, later on - noted to be more hypoxic and sleepy  - she received Dilaudid o.2 mg ivX2 earlier with dressing changes  - VBG with pH 7.27, pCO2 66  - started on BiPAP  - later on- mentation improved; repeat VBG pH 7.3, pCO2 62    - Judicious use or narcotics   - hold PTA Lyrica, Wellbutrin, Requip (BID during the day, keep Requip at bedtime)  - suspect underlying undiagnosed BOLIVAR based on body habitus  - sleep studies as outpatient recommended  - BiPAP prn  - continue IMC status      Acute blood loss anemia  Chronic normocytic anemia  - Baseline 10-11. Down to 8.3 post-procedure. No active bleeding. Could be dilutional as checked while receiving IVF bolus   - Hb today down to 6.1 but repeated Hb 7.1  - would transfuse if Hb<7  - Hold PTA ASA 81 mg po every day   - discontinue scheduled Celebrex  - Hb in am     Chronic BLE lymphedema with venous insufficiency and chronic R leg cellulitis s/p excisional debridement of RLE cicumferential venous hypertensive ulceration and application of wound vac (3/21/22)  - Defer routine post-operative cares to Dr. Bullock   - pain management- may have difficulty with pain control given her fluctuating mentation; reported severe pain with dressing changes, Dilaudid 0.2 mg ivX2 given earlier followed by some AMS and evidence of CO2 retention  - discussed with Dr Bullock, started her on Gabapentin 300 mg po TID  - also has Toradol available q6h prn (monitor Hb)  - hold PTA Lyrica for now  - Plan for another trip to the OR once medically optimized   - PTA Lasix and Aldocatone held yesterday while  "receiving IVF for sepsis; will resume PTA Lasix 20 mg po BID and Aldactone 25 mg po daily    Severe Obesity: Estimated body mass index is 50.81 kg/m  as calculated from the following:    Height as of this encounter: 1.549 m (5' 1\").    Weight as of this encounter: 122 kg (268 lb 14.4 oz).       MS: Ambulatory at baseline, though some weakness in lower extremities and intermittent upper extremity weakness.     GERD: Continue Protonix   Hyperlipidemia: Continue statin   Depression: Continue Celexa; hold PTA Wellbutrin given AMS  RLS: hold PTA scheduled Requip during the daytime, continiu PTA Requip 1 mg po qhs       Diet: Regular Diet Adult    DVT Prophylaxis: Defer to primary service  Amato Catheter: PRESENT, indication: Strict 1-2 Hour I&O  Central Lines: None  Cardiac Monitoring: ACTIVE order. Indication: sepsis  Code Status: Full Code      Disposition Plan   Expected Discharge: 04/04/2022     Anticipated discharge location: home with family    Delays:           The patient's care was discussed with the Attending Physician, Dr. Bullock, Bedside Nurse, Patient and Patient's Family.    Yessica Meehan MD  Hospitalist Service  [facility]  Securely message with the Vocera Web Console (learn more here)  Text page via InstallFree Paging/Directory         Clinically Significant Risk Factors Present on Admission             # Severe Obesity: Estimated body mass index is 50.81 kg/m  as calculated from the following:    Height as of this encounter: 1.549 m (5' 1\").    Weight as of this encounter: 122 kg (268 lb 14.4 oz).      ______________________________________________________________________    Interval History   Was awake and alert in am, off BiPAP; later on- she was more confused, put back on BiPAP  - after being on BiPAP for 1.5h- mentation improved again  - denies chest pain, reports some SOB  - no N/V, no abd pain  - daughter at bedside; discussed with the patient that I suspect that she has " "undiagnosed BOLIVAR, the daughter agrees (the daughter herself has BOLIVAR) but the patient states that \"she sleep well at night and does not take naps during the day\"; she does admit that she snores.     Data reviewed today: I reviewed all medications, new labs and imaging results over the last 24 hours. I personally reviewed the chest x-ray image(s) showing as above.    Physical Exam   Vital Signs: Temp: 99.3  F (37.4  C) Temp src: Axillary BP: 110/74 Pulse: 84   Resp: 19 SpO2: 100 % O2 Device: BiPAP/CPAP Oxygen Delivery: 4 LPM  Weight: 268 lbs 14.4 oz    CONSTITUTIONAL: awake, alert, up in the recliner, on BiPAP, NAD  HEENT: Normocephalic, atraumatic.   CARDIOVASCULAR: S1S2, RRR, no murmurs  RESPIRATORY: on BiPAP but comfortable; diminished air entry at bases, no wheezing, no wheezing, no rales, no crackles   GASTROINTESTINAL:  Abdomen soft, obese, non-distended. BS auscultated in all four quadrants. Negative for tenderness to palpation.  No masses or organomegaly noted.  MUSCULOSKELETAL: No gross deformities noted. Normal muscle tone.   EXTREMITIES: b/o LE lymphedema, RLE covered with dressing  NEUROLOGIC: Alert and oriented to person, place, and time.  strength intact. No focal neuro deficits.   PSYCH- normal mood       Data   Recent Labs   Lab 03/24/22  1123 03/24/22  0922 03/24/22  0921 03/24/22  0807 03/24/22  0534 03/23/22  1814 03/23/22  1626 03/22/22  0611 03/21/22  0830   WBC  --  11.9*  --   --   --   --  18.3*  --   --    HGB 7.1* 6.1*  --   --   --   --  8.3*  --   --    MCV  --  89  --   --   --   --  89  --   --    PLT  --  269  --   --   --   --  436  --   --    NA  --   --  134  --   --   --  132*  --   --    POTASSIUM  --   --  4.2  --   --   --  5.3  --  4.2   CHLORIDE  --   --  104  --   --   --  102  --   --    CO2  --   --  26  --   --   --  28  --   --    BUN  --   --  31*  --   --   --  32*  --   --    CR  --   --  0.95  --  0.99  --  1.04   < >  --    ANIONGAP  --   --  4  --   --   --  2*  -- "   --    JUNI  --   --  8.7  --   --   --  9.2  --   --    GLC  --   --  154* 120*  --   --  130*   < >  --    ALBUMIN  --   --   --   --   --  2.0*  --   --   --    PROTTOTAL  --   --   --   --   --  5.8*  --   --   --    BILITOTAL  --   --   --   --   --  0.2  --   --   --    ALKPHOS  --   --   --   --   --  156*  --   --   --    ALT  --   --   --   --   --  29  --   --   --    AST  --   --   --   --   --  63*  --   --   --     < > = values in this interval not displayed.     Recent Results (from the past 24 hour(s))   XR Chest Port 1 View    Narrative    EXAM: XR CHEST PORT 1 VIEW  LOCATION: [facility]  DATE/TIME: 3/24/2022 1:15 AM    INDICATION: Concern for volume overload is status post IV fluid resuscitation.  COMPARISON: 10/13/2021      Impression    IMPRESSION: Normal heart size and pulmonary vascularity. No evidence for overt CHF/I'm overload. Minimal linear atelectasis left lung base. Mild tortuous aorta. No overt osseous abnormality.     Medications     - MEDICATION INSTRUCTIONS -       - MEDICATION INSTRUCTIONS -         [Held by provider] aspirin  81 mg Oral QPM     [Held by provider] buPROPion  150 mg Oral QAM     citalopram  40 mg Oral QAM     cyanocobalamin  1,000 mcg Oral Daily     diosmin-hesperidin 450-50  1 capsule Oral BID     furosemide  10 mg Oral BID     gabapentin  300 mg Oral TID     ipratropium - albuterol 0.5 mg/2.5 mg/3 mL  3 mL Nebulization Q4H While awake     Aleksandr  1 packet Oral BID     multivitamin w/minerals  1 tablet Oral Daily     pantoprazole  40 mg Oral Daily     piperacillin-tazobactam  3.375 g Intravenous Q6H     [Held by provider] potassium chloride ER  20 mEq Oral QPM     [Held by provider] potassium chloride ER  40 mEq Oral QAM     [Held by provider] pregabalin  100 mg Oral BID     [Held by provider] rOPINIRole  0.5 mg Oral BID     rOPINIRole  1 mg Oral At Bedtime     simvastatin  40 mg Oral At Bedtime     sodium chloride (PF)  3 mL Intracatheter Q8H      spironolactone  25 mg Oral Daily     vancomycin  1,500 mg Intravenous Q24H     vitamin B complex with vitamin C  1 tablet Oral Daily     Vitamin D3  250 mcg Oral Daily

## 2022-03-25 ENCOUNTER — APPOINTMENT (OUTPATIENT)
Dept: PHYSICAL THERAPY | Facility: CLINIC | Age: 77
DRG: 264 | End: 2022-03-25
Attending: SURGERY
Payer: COMMERCIAL

## 2022-03-25 LAB
ABO/RH(D): NORMAL
ANION GAP SERPL CALCULATED.3IONS-SCNC: 5 MMOL/L (ref 3–14)
ANTIBODY SCREEN: NEGATIVE
ATRIAL RATE - MUSE: 78 BPM
ATRIAL RATE - MUSE: 95 BPM
BASOPHILS # BLD AUTO: 0 10E3/UL (ref 0–0.2)
BASOPHILS NFR BLD AUTO: 0 %
BLD PROD TYP BPU: NORMAL
BLOOD COMPONENT TYPE: NORMAL
BUN SERPL-MCNC: 28 MG/DL (ref 7–30)
CALCIUM SERPL-MCNC: 8.7 MG/DL (ref 8.5–10.1)
CHLORIDE BLD-SCNC: 103 MMOL/L (ref 94–109)
CO2 SERPL-SCNC: 28 MMOL/L (ref 20–32)
CODING SYSTEM: NORMAL
CREAT SERPL-MCNC: 1.07 MG/DL (ref 0.52–1.04)
CROSSMATCH: NORMAL
DIASTOLIC BLOOD PRESSURE - MUSE: NORMAL MMHG
DIASTOLIC BLOOD PRESSURE - MUSE: NORMAL MMHG
EOSINOPHIL # BLD AUTO: 0.4 10E3/UL (ref 0–0.7)
EOSINOPHIL NFR BLD AUTO: 4 %
ERYTHROCYTE [DISTWIDTH] IN BLOOD BY AUTOMATED COUNT: 15.1 % (ref 10–15)
GFR SERPL CREATININE-BSD FRML MDRD: 54 ML/MIN/1.73M2
GLUCOSE BLD-MCNC: 115 MG/DL (ref 70–99)
GLUCOSE BLDC GLUCOMTR-MCNC: 100 MG/DL (ref 70–99)
HCT VFR BLD AUTO: 21.9 % (ref 35–47)
HGB BLD-MCNC: 6.7 G/DL (ref 11.7–15.7)
HGB BLD-MCNC: 7.1 G/DL (ref 11.7–15.7)
IMM GRANULOCYTES # BLD: 0.1 10E3/UL
IMM GRANULOCYTES NFR BLD: 1 %
INTERPRETATION ECG - MUSE: NORMAL
INTERPRETATION ECG - MUSE: NORMAL
ISSUE DATE AND TIME: NORMAL
LYMPHOCYTES # BLD AUTO: 1.2 10E3/UL (ref 0.8–5.3)
LYMPHOCYTES NFR BLD AUTO: 13 %
MCH RBC QN AUTO: 26.1 PG (ref 26.5–33)
MCHC RBC AUTO-ENTMCNC: 30.6 G/DL (ref 31.5–36.5)
MCV RBC AUTO: 85 FL (ref 78–100)
MONOCYTES # BLD AUTO: 1.1 10E3/UL (ref 0–1.3)
MONOCYTES NFR BLD AUTO: 12 %
NEUTROPHILS # BLD AUTO: 6.3 10E3/UL (ref 1.6–8.3)
NEUTROPHILS NFR BLD AUTO: 70 %
NRBC # BLD AUTO: 0 10E3/UL
NRBC BLD AUTO-RTO: 0 /100
P AXIS - MUSE: 48 DEGREES
P AXIS - MUSE: 59 DEGREES
PLATELET # BLD AUTO: 283 10E3/UL (ref 150–450)
POTASSIUM BLD-SCNC: 3.4 MMOL/L (ref 3.4–5.3)
PR INTERVAL - MUSE: 130 MS
PR INTERVAL - MUSE: 146 MS
QRS DURATION - MUSE: 80 MS
QRS DURATION - MUSE: 82 MS
QT - MUSE: 316 MS
QT - MUSE: 348 MS
QTC - MUSE: 396 MS
QTC - MUSE: 397 MS
R AXIS - MUSE: 6 DEGREES
R AXIS - MUSE: 9 DEGREES
RBC # BLD AUTO: 2.57 10E6/UL (ref 3.8–5.2)
SODIUM SERPL-SCNC: 136 MMOL/L (ref 133–144)
SPECIMEN EXPIRATION DATE: NORMAL
SYSTOLIC BLOOD PRESSURE - MUSE: NORMAL MMHG
SYSTOLIC BLOOD PRESSURE - MUSE: NORMAL MMHG
T AXIS - MUSE: 40 DEGREES
T AXIS - MUSE: 48 DEGREES
UNIT ABO/RH: NORMAL
UNIT NUMBER: NORMAL
UNIT STATUS: NORMAL
UNIT TYPE ISBT: 6200
VENTRICULAR RATE- MUSE: 78 BPM
VENTRICULAR RATE- MUSE: 95 BPM
WBC # BLD AUTO: 9 10E3/UL (ref 4–11)

## 2022-03-25 PROCEDURE — 250N000013 HC RX MED GY IP 250 OP 250 PS 637: Performed by: SURGERY

## 2022-03-25 PROCEDURE — G0463 HOSPITAL OUTPT CLINIC VISIT: HCPCS

## 2022-03-25 PROCEDURE — 97161 PT EVAL LOW COMPLEX 20 MIN: CPT | Mod: GP | Performed by: PHYSICAL THERAPIST

## 2022-03-25 PROCEDURE — 250N000011 HC RX IP 250 OP 636: Performed by: SURGERY

## 2022-03-25 PROCEDURE — 80048 BASIC METABOLIC PNL TOTAL CA: CPT | Performed by: PHYSICIAN ASSISTANT

## 2022-03-25 PROCEDURE — 250N000013 HC RX MED GY IP 250 OP 250 PS 637: Performed by: INTERNAL MEDICINE

## 2022-03-25 PROCEDURE — 86923 COMPATIBILITY TEST ELECTRIC: CPT | Performed by: INTERNAL MEDICINE

## 2022-03-25 PROCEDURE — 250N000009 HC RX 250: Performed by: PHYSICIAN ASSISTANT

## 2022-03-25 PROCEDURE — 36415 COLL VENOUS BLD VENIPUNCTURE: CPT | Performed by: INTERNAL MEDICINE

## 2022-03-25 PROCEDURE — 999N000157 HC STATISTIC RCP TIME EA 10 MIN

## 2022-03-25 PROCEDURE — 99233 SBSQ HOSP IP/OBS HIGH 50: CPT | Performed by: INTERNAL MEDICINE

## 2022-03-25 PROCEDURE — 250N000013 HC RX MED GY IP 250 OP 250 PS 637: Performed by: PHYSICIAN ASSISTANT

## 2022-03-25 PROCEDURE — 86850 RBC ANTIBODY SCREEN: CPT | Performed by: INTERNAL MEDICINE

## 2022-03-25 PROCEDURE — 94640 AIRWAY INHALATION TREATMENT: CPT

## 2022-03-25 PROCEDURE — 250N000011 HC RX IP 250 OP 636: Performed by: PHYSICIAN ASSISTANT

## 2022-03-25 PROCEDURE — 97530 THERAPEUTIC ACTIVITIES: CPT | Mod: GP | Performed by: PHYSICAL THERAPIST

## 2022-03-25 PROCEDURE — P9016 RBC LEUKOCYTES REDUCED: HCPCS | Performed by: INTERNAL MEDICINE

## 2022-03-25 PROCEDURE — 94660 CPAP INITIATION&MGMT: CPT

## 2022-03-25 PROCEDURE — 85025 COMPLETE CBC W/AUTO DIFF WBC: CPT | Performed by: PHYSICIAN ASSISTANT

## 2022-03-25 PROCEDURE — 94640 AIRWAY INHALATION TREATMENT: CPT | Mod: 76

## 2022-03-25 PROCEDURE — 36415 COLL VENOUS BLD VENIPUNCTURE: CPT | Performed by: PHYSICIAN ASSISTANT

## 2022-03-25 PROCEDURE — 120N000013 HC R&B IMCU

## 2022-03-25 PROCEDURE — 85018 HEMOGLOBIN: CPT | Performed by: INTERNAL MEDICINE

## 2022-03-25 PROCEDURE — 97116 GAIT TRAINING THERAPY: CPT | Mod: GP | Performed by: PHYSICAL THERAPIST

## 2022-03-25 RX ORDER — LIDOCAINE HYDROCHLORIDE 40 MG/ML
50-100 SOLUTION TOPICAL EVERY 4 HOURS PRN
Status: DISCONTINUED | OUTPATIENT
Start: 2022-03-25 | End: 2022-04-29 | Stop reason: HOSPADM

## 2022-03-25 RX ADMIN — PIPERACILLIN SODIUM AND TAZOBACTAM SODIUM 3.38 G: 3; .375 INJECTION, POWDER, LYOPHILIZED, FOR SOLUTION INTRAVENOUS at 11:33

## 2022-03-25 RX ADMIN — IPRATROPIUM BROMIDE AND ALBUTEROL SULFATE 3 ML: .5; 3 SOLUTION RESPIRATORY (INHALATION) at 11:09

## 2022-03-25 RX ADMIN — Medication 1 CAPSULE: at 21:18

## 2022-03-25 RX ADMIN — CYANOCOBALAMIN TAB 1000 MCG 1000 MCG: 1000 TAB at 08:01

## 2022-03-25 RX ADMIN — PIPERACILLIN SODIUM AND TAZOBACTAM SODIUM 3.38 G: 3; .375 INJECTION, POWDER, LYOPHILIZED, FOR SOLUTION INTRAVENOUS at 23:54

## 2022-03-25 RX ADMIN — HYDROMORPHONE HYDROCHLORIDE 0.2 MG: 0.2 INJECTION, SOLUTION INTRAMUSCULAR; INTRAVENOUS; SUBCUTANEOUS at 21:37

## 2022-03-25 RX ADMIN — IPRATROPIUM BROMIDE AND ALBUTEROL SULFATE 3 ML: .5; 3 SOLUTION RESPIRATORY (INHALATION) at 19:42

## 2022-03-25 RX ADMIN — HYDROMORPHONE HYDROCHLORIDE 2 MG: 2 TABLET ORAL at 12:25

## 2022-03-25 RX ADMIN — GABAPENTIN 300 MG: 300 CAPSULE ORAL at 16:26

## 2022-03-25 RX ADMIN — MULTIPLE VITAMINS W/ MINERALS TAB 1 TABLET: TAB at 08:01

## 2022-03-25 RX ADMIN — PIPERACILLIN SODIUM AND TAZOBACTAM SODIUM 3.38 G: 3; .375 INJECTION, POWDER, LYOPHILIZED, FOR SOLUTION INTRAVENOUS at 05:23

## 2022-03-25 RX ADMIN — Medication 250 MCG: at 08:12

## 2022-03-25 RX ADMIN — IPRATROPIUM BROMIDE AND ALBUTEROL SULFATE 3 ML: .5; 3 SOLUTION RESPIRATORY (INHALATION) at 15:20

## 2022-03-25 RX ADMIN — CITALOPRAM HYDROBROMIDE 40 MG: 20 TABLET, FILM COATED ORAL at 08:01

## 2022-03-25 RX ADMIN — FUROSEMIDE 10 MG: 20 TABLET ORAL at 08:00

## 2022-03-25 RX ADMIN — B-COMPLEX W/ C & FOLIC ACID TAB 1 TABLET: TAB at 08:01

## 2022-03-25 RX ADMIN — Medication 1 PACKET: at 08:02

## 2022-03-25 RX ADMIN — ACETAMINOPHEN 650 MG: 325 TABLET, FILM COATED ORAL at 23:59

## 2022-03-25 RX ADMIN — HYDROMORPHONE HYDROCHLORIDE 0.2 MG: 0.2 INJECTION, SOLUTION INTRAMUSCULAR; INTRAVENOUS; SUBCUTANEOUS at 20:28

## 2022-03-25 RX ADMIN — IPRATROPIUM BROMIDE AND ALBUTEROL SULFATE 3 ML: .5; 3 SOLUTION RESPIRATORY (INHALATION) at 07:20

## 2022-03-25 RX ADMIN — GABAPENTIN 300 MG: 300 CAPSULE ORAL at 21:18

## 2022-03-25 RX ADMIN — SIMVASTATIN 40 MG: 40 TABLET, FILM COATED ORAL at 21:18

## 2022-03-25 RX ADMIN — GABAPENTIN 300 MG: 300 CAPSULE ORAL at 08:00

## 2022-03-25 RX ADMIN — PANTOPRAZOLE SODIUM 40 MG: 40 TABLET, DELAYED RELEASE ORAL at 08:01

## 2022-03-25 RX ADMIN — ROPINIROLE HYDROCHLORIDE 1 MG: 0.5 TABLET, FILM COATED ORAL at 21:18

## 2022-03-25 RX ADMIN — SPIRONOLACTONE 25 MG: 25 TABLET ORAL at 08:01

## 2022-03-25 RX ADMIN — FUROSEMIDE 10 MG: 20 TABLET ORAL at 16:26

## 2022-03-25 RX ADMIN — Medication 1 CAPSULE: at 08:12

## 2022-03-25 RX ADMIN — IPRATROPIUM BROMIDE AND ALBUTEROL SULFATE 3 ML: .5; 3 SOLUTION RESPIRATORY (INHALATION) at 23:32

## 2022-03-25 RX ADMIN — PIPERACILLIN SODIUM AND TAZOBACTAM SODIUM 3.38 G: 3; .375 INJECTION, POWDER, LYOPHILIZED, FOR SOLUTION INTRAVENOUS at 17:45

## 2022-03-25 ASSESSMENT — ACTIVITIES OF DAILY LIVING (ADL)
ADLS_ACUITY_SCORE: 10
ADLS_ACUITY_SCORE: 8
ADLS_ACUITY_SCORE: 10
ADLS_ACUITY_SCORE: 8
ADLS_ACUITY_SCORE: 10
ADLS_ACUITY_SCORE: 8
ADLS_ACUITY_SCORE: 10
ADLS_ACUITY_SCORE: 8
ADLS_ACUITY_SCORE: 10
ADLS_ACUITY_SCORE: 10
ADLS_ACUITY_SCORE: 8
ADLS_ACUITY_SCORE: 10
ADLS_ACUITY_SCORE: 8
ADLS_ACUITY_SCORE: 10
ADLS_ACUITY_SCORE: 8
ADLS_ACUITY_SCORE: 10
ADLS_ACUITY_SCORE: 8

## 2022-03-25 NOTE — PROGRESS NOTES
A&O x4, some intermittent confusion. VSS on BiPap 30% overnight. Pain controlled with sana gabapentin and prn tylenol. HGB 6.4, 1 unit PRBC infused as ordered. IV vanco and zosyn infused as orders. RLE dressing intact, dressing orders needed for weekend. CMS intact, pulses present with doppler. Amato patent with adequate output. LS: expiratory wheezes BS: audible. Chairfast at this time. HGB redraw scheduled for 0830, AM labs too close to infusion finish time.

## 2022-03-25 NOTE — PROGRESS NOTES
[facility]    Medicine Progress Note - Hospitalist Service    Date of Admission:  3/21/2022    Assessment & Plan          Elizabeth Kelley is a 76 year old female with PMHx MS, chronic BLE lymphedema with venous insufficiency and chronic R leg cellulitis, GERD, hyperlipidemia, and depression who underwent previously scheduled excisional debridement of RLE cicumferential venous hypertensive ulceration and application of wound vac on 3/21/22. Hospitalist service re-consulted 3/23/22 for decreased responsiveness, meeting sepsis criteria with suspected pneumonia as source.      Sepsis secondary to CAP vs aspiration pneumonia  Acute hypoxic respiratory failure secondary to above  Hx of MRSA: Tmax 101.5, tachycardic in the 110s, hypoxic to 86% on room air. WBC 18.3, procal 0.17. Lactic 1.3. UA with trace LE, WBC 7. . VBG 7.33/54/57/28. CXR with patchy airspace opacities in the left mid and lower lung, suspicious for pneumonia.  Highest suspicion for pneumonia given the above, though note hx of recurrent RLE cellulitis with procedure as above. Hx of MRSA. RLE with wound vac in place.   - Moved [place] [facility] [place] on [place]   - Volume resucitated to 3660 ccs per sepsis protocol   - Started IV Zosyn + Vancomycin (received 2 doses of Ancef devon and post-op)  - Blood cultures- NGTD  - RCAT consulted, encourage pulmonary toilet with IS and acapella   - Duonebs q4 hrs while awake, PRN albuterol nebs   - afebrile today, WBCs trending down 18.3--11.9--9  - continue Zosyn for now, stop Vanco today  - 3/24- was wheezy in am, possible some fluid overload  - Lasix 40 mg ivX1 given  - monitor fluid status  - resumed PTA Lasix and Aldactone  - currently on O2 32 liters, wean oxygen as above   - SLP consult- no evidence of dysphagia     Encephalopathy, multifactorial- improved   Acute hypoxic and hypercapnic respiratory failure- multifactorial  - Altered mentation noted by nursing yesterday  - Multifactorial in the setting  of fever, narcotic pain medications, suspected underlying BOLIVAR, possible dehydration. Received dose of narcan, but did not significantly change pts mentation immediately, though seemed to improve within the hour after IVF bolus was near completion. Oriented X2 initially on my exam, drifting to sleep midway through conversation, but easily arousable. Mentation improved upon reevaluation within 30 minutes.   - Treat infection as above   - 3/24- she was awake and alert in am, later on - noted to be more hypoxic and sleepy  - she received Dilaudid o.2 mg ivX2 earlier with dressing changes  - VBG with pH 7.27, pCO2 66  - started on BiPAP  - later on- mentation improved; repeat VBG pH 7.3, pCO2 62    - Judicious use or narcotics   - hold PTA Lyrica, Wellbutrin, Requip (BID during the day, keep Requip at bedtime)  - suspect underlying undiagnosed BOLIVAR based on body habitus  - was on BiPAP overnight, now on NC, awake, alert, comfortable, eating breakfast  - BiPAP prn  - continue IMC status for now  - sleep studies as outpatient recommended     Acute blood loss anemia  Chronic normocytic anemia  - Baseline 10-11. Down to 8.3 post-procedure. No active bleeding. Could be dilutional as checked while receiving IVF bolus   - 3/24- Hb down to 6.1 but repeated Hb 7.1, again down to 6.4 last night  - transfused 1 unit PRBCs  - Hb in am 7.1  - serial Hb  - would transfuse if Hb<7  - Hold PTA ASA 81 mg po for now  - discontinued scheduled Celebrex  - Hb later on today and in am  - monitor fluid status, may need additional Lasix if evidence of fluid overload     Chronic BLE lymphedema with venous insufficiency and chronic R leg cellulitis s/p excisional debridement of RLE cicumferential venous hypertensive ulceration and application of wound vac (3/21/22)  - Defer routine post-operative cares to Dr. Bullock   - pain management- may have difficulty with pain control given her fluctuating mentation; reported severe pain with dressing  "changes, Dilaudid 0.2 mg ivX2 given earlier followed by some AMS and evidence of CO2 retention  - discussed with Dr Bullock, started her on Gabapentin 300 mg po TID  - also has Toradol available q6h prn (monitor Hb)  - hold PTA Lyrica for now given AMS, consider resuming it if mentation remains stable  - Plan is to have TID dressing changes until Monday; WOC consult as RN does not have orders for wound care  - Plan for another trip to the OR once medically optimized   - PTA Lasix and Aldocatone held yesterday while receiving IVF for sepsis; will resume PTA Lasix 20 mg po BID and Aldactone 25 mg po daily    Severe Obesity: Estimated body mass index is 50.81 kg/m  as calculated from the following:    Height as of this encounter: 1.549 m (5' 1\").    Weight as of this encounter: 122 kg (268 lb 14.4 oz).       MS: Ambulatory at baseline, though some weakness in lower extremities and intermittent upper extremity weakness.     Hyponatremia, mild, resolved  - Na 132 on 3/23--improved to 136    GERD: Continue Protonix   Hyperlipidemia: Continue statin   Depression: Continue Celexa; hold PTA Wellbutrin given AMS, consider resuming soon if mentation remains fine.  RLS: hold PTA scheduled Requip during the daytime, continie PTA Requip 1 mg po qhs       Diet: Regular Diet Adult    DVT Prophylaxis: Defer to primary service  Amato Catheter: PRESENT, indication: Strict 1-2 Hour I&O  Central Lines: None  Cardiac Monitoring: ACTIVE order. Indication: hypoxic hypercapneic resp failure  Code Status: Full Code      Disposition Plan   Expected Discharge: 04/04/2022     Anticipated discharge location: home with family    Delays:           The patient's care was discussed with the Bedside Nurse and Patient.    Yessica Meehan MD  Hospitalist Service  [facility]  Securely message with the Vocera Web Console (learn more here)  Text page via "[employer] Paging/Directory         Clinically Significant Risk Factors " "Present on Admission             # Severe Obesity: Estimated body mass index is 50.81 kg/m  as calculated from the following:    Height as of this encounter: 1.549 m (5' 1\").    Weight as of this encounter: 122 kg (268 lb 14.4 oz).      ______________________________________________________________________    Interval History    Doing better, was on BiPAP overnight, now on NC  - up in the chair, eating breakfast  - reports some \"chest tightness from BiPAP\" but no SOB, no chest pain  - has some mildly productive cough  - no N/V, no abd pain  - reports pain 2/10 in RLE      Data reviewed today: I reviewed all medications, new labs and imaging results over the last 24 hours. I personally reviewed no images or EKG's today.    Physical Exam   Vital Signs: Temp: 98.4  F (36.9  C) Temp src: Oral BP: (!) 123/91 Pulse: 68   Resp: 17 SpO2: 96 % O2 Device: Nasal cannula Oxygen Delivery: 3 LPM  Weight: 268 lbs 14.4 oz    CONSTITUTIONAL: awake, alert, up in the recliner, NAD  HEENT: Normocephalic, atraumatic.   CARDIOVASCULAR: S1S2, RRR, no murmurs  RESPIRATORY: diminished air entry at bases, mild bibasilar crackles, no wheezing, no wheezing, no rales.  GASTROINTESTINAL:  Abdomen soft, obese, non-distended. BS auscultated in all four quadrants. Negative for tenderness to palpation.  No masses or organomegaly noted.  MUSCULOSKELETAL: No gross deformities noted. Normal muscle tone.   EXTREMITIES: RLE covered with dressing, LLE- chronic indurated skin changes  NEUROLOGIC: Alert and oriented to person, place, and time.  strength intact. No focal neuro deficits.   PSYCH- normal mood       Data   Recent Labs   Lab 03/25/22  0603 03/24/22  2245 03/24/22  1123 03/24/22  0922 03/24/22  0921 03/24/22  0807 03/24/22  0534 03/23/22  1814 03/23/22  1626   WBC 9.0  --   --  11.9*  --   --   --   --  18.3*   HGB 6.7* 6.4* 7.1* 6.1*  --   --   --   --  8.3*   MCV 85  --   --  89  --   --   --   --  89     --   --  269  --   --   --   " --  436     --   --   --  134  --   --   --  132*   POTASSIUM 3.4  --   --   --  4.2  --   --   --  5.3   CHLORIDE 103  --   --   --  104  --   --   --  102   CO2 28  --   --   --  26  --   --   --  28   BUN 28  --   --   --  31*  --   --   --  32*   CR 1.07*  --   --   --  0.95  --  0.99  --  1.04   ANIONGAP 5  --   --   --  4  --   --   --  2*   JUNI 8.7  --   --   --  8.7  --   --   --  9.2   *  --   --   --  154* 120*  --   --  130*   ALBUMIN  --   --   --   --   --   --   --  2.0*  --    PROTTOTAL  --   --   --   --   --   --   --  5.8*  --    BILITOTAL  --   --   --   --   --   --   --  0.2  --    ALKPHOS  --   --   --   --   --   --   --  156*  --    ALT  --   --   --   --   --   --   --  29  --    AST  --   --   --   --   --   --   --  63*  --      No results found for this or any previous visit (from the past 24 hour(s)).  Medications     - MEDICATION INSTRUCTIONS -       - MEDICATION INSTRUCTIONS -         [Held by provider] aspirin  81 mg Oral QPM     [Held by provider] buPROPion  150 mg Oral QAM     citalopram  40 mg Oral QAM     cyanocobalamin  1,000 mcg Oral Daily     diosmin-hesperidin 450-50  1 capsule Oral BID     furosemide  10 mg Oral BID     gabapentin  300 mg Oral TID     ipratropium - albuterol 0.5 mg/2.5 mg/3 mL  3 mL Nebulization Q4H While awake     Aleksandr  1 packet Oral BID     multivitamin w/minerals  1 tablet Oral Daily     pantoprazole  40 mg Oral Daily     piperacillin-tazobactam  3.375 g Intravenous Q6H     [Held by provider] potassium chloride ER  20 mEq Oral QPM     [Held by provider] potassium chloride ER  40 mEq Oral QAM     [Held by provider] pregabalin  100 mg Oral BID     [Held by provider] rOPINIRole  0.5 mg Oral BID     rOPINIRole  1 mg Oral At Bedtime     simvastatin  40 mg Oral At Bedtime     sodium chloride (PF)  3 mL Intracatheter Q8H     spironolactone  25 mg Oral Daily     vancomycin  1,500 mg Intravenous Q24H     vitamin B complex with vitamin C  1 tablet Oral  Daily     Vitamin D3  250 mcg Oral Daily

## 2022-03-25 NOTE — PROGRESS NOTES
X-cover    Hgb at 6.4 this pm. Denies bleeding  - consent signed  - transfuse conditional hgb <7    Nash Boo MD

## 2022-03-25 NOTE — PLAN OF CARE
Shift: [id number]      Neuro: Pt is alert and oriented, moves all extremities; weakness in her bilateral lower extremities    CV: Heart rhythm is sinus, adequate BP's; afebrile, doppler pedal pulses     Resp: Lungs are diminished, pt wearing 3L nasal cannula during the day and Bipap at night     GI: Pt had medium sized BM this AM on the commode     : Amato in place, continues on PO lasix with good output     Skin: Pt has bruising on her right hand and scattered over her arms bilaterally; large wound on right shin/calf-see WOC orders     Mobility/Activity: Pt up in the chair most of the day, lower extremities elevated      Pain: Pt has pain in the right lower extremity with any touch or movement; PRN dilaudid PO given prior to wound dressing change     Family Updated: Pt's daughter, Carolyn, at the bedside-plan of care discussed with both patient and daughter

## 2022-03-25 NOTE — CONSULTS
[facility]  WO Nurse Inpatient Wound Assessment     Reason for consultation: Evaluate and treat RLE      Assessment  RLE: extensive circumferential ulcerations, related to lymphedema and venous issues.  S/p debridement and VeraFlo 3/21/22 Dr. Bullock, vac removed bedside 3/24, TID dressing changes planned for the weekend. WOC consulted by Hospitalist to assist nursing with dressing change/ clarify orders.     Treatment Plan  Right lower extremity: 3x day:   1.  Pre-medicate for pain as needed  2.  Carefully remove old Kerlix dressing; soak it off as needed with saline (please conserve the Vashe for the dressing)  3.  If the silver Acticoat contact layer remains adherent, ok to leave in place.  If it is floating off, trim away the loose areas.   4.  If needed, pt has prn topical liquid lidocaine (order up from pharmacy) that can be applied to any very stuck/painful areas.  Can use an empty syringe to draw up and apply the lidocaine where needed.   5.  Open a Kerlix roll and pour in the Vashe (blue bottle), just enough until well moistened.  6.  Wrap the moist Kerlix lightly around leg/wounds.    7.  Ok to leave open or lightly wrapped with chux pad when in bed/chair.  If need to cover or manage drainage, ie when ambulating, trim a large chux pad to fit around leg, and secure with tape or Kerlix.    8.  Elevate legs whenever possible.  Take occasional breaks from the blue wedges to reduce pressure to sacral area.     Defer further cares to Dr. Bullock next week.     Orders Written  Recommended provider order: None, at this time  WOC Nurse follow-up plan: next week prn  Nursing to notify the Provider(s) and re-consult the WO Nurse if wound(s) deteriorates or new skin concern.    Patient History  According to provider note(s):  Elizabeth Kelley is a 76 year old female with PMHx MS, chronic BLE lymphedema with venous insufficiency and chronic R leg cellulitis, GERD, hyperlipidemia, and depression  who underwent previously scheduled excisional debridement of RLE cicumferential venous hypertensive ulceration and application of wound vac on 3/21/22. Hospitalist service re-consulted 3/23/22 for decreased responsiveness, meeting sepsis criteria with suspected pneumonia as source.     Objective Data  Containment of urine/stool: Incontinence Protocol prn    Active Diet Order:  Orders Placed This Encounter      Regular Diet Adult    Output:   I/O last 3 completed shifts:  In: 350   Out: 2905 [Urine:2905]    Risk Assessment:   Sensory Perception: 3-->slightly limited  Moisture: 3-->occasionally moist  Activity: 2-->chairfast  Mobility: 2-->very limited  Nutrition: 3-->adequate  Friction and Shear: 2-->potential problem  Ajith Score: 15                          Labs: Recent Labs   Lab 03/25/22  0838 03/25/22  0603 03/24/22  0922 03/23/22  1814   ALBUMIN  --   --   --  2.0*   HGB 7.1* 6.7*   < >  --    WBC  --  9.0   < >  --     < > = values in this interval not displayed.       Physical Exam  Skin inspection: focused RLE    Wound Location:  RLE  Date of last photo:  3-25-22      3-25-22 right lateral/posterior lower leg      Wound History: chronic extensive wound followed by Dr. Bullock; s/p I&D with VeraFlo 3/21, pt was unable to go to OR for VeraFlo exchange 3/24 d/t respiratory issues so vac was tediously removed at bedside by Dr. Bullock, plan is for TID Vashe dressings until Monday then return to OR for debridement or grafting.   Measurements (length x width x depth, in cm):  Roughly 17cm x circumferential x 0.2+cm   Wound Base: most of base not visible due to adherent Acticoat 7 contact layer in place; pink moist where visible  Tunneling: N/A  Undermining: N/A  Palpation of the wound bed: normal   Periwound skin: erythema- blanchable and macerated  Color: pink  Temperature: normal   Drainage: moderate  Description of drainage: serosanguinous  Odor: minimal  Pain: moderate but tolerated dressing change well, likely  partly due to the contact layer still being in place    Interventions  Current support surface: Standard  Atmos Air mattress  Current off-loading measures: Pillows and blue foam wedges  Visual inspection of wound(s) completed  Wound Care: done per plan of care  Supplies: reviewed, gathered and placed at the bedside  Education provided: importance of repositioning, plan of care, wound progress, Moisture management and Off-loading pressure  Discussed plan of care with Patient, Family and Nurse; briefly discussed with Dr. Bullock on 3/24 at bedside    Vida Badillo RN, CWOCN

## 2022-03-25 NOTE — PLAN OF CARE
IMC: VSS except Bipap intermittently at 30% d/t Co2 retention and use of narcotics. Initially patient was on room air but early afternoon became hypoxic in the 80's while falling asleep in the chair. Pt is alert and oriented this morning but increasing got forgetful throughout the day. Speech became slurred and patient became more lethargic during the day, did not improve when oxygen was applied. VBG ordered and placed on bipap approx 2PM. Tele SR. Pain has been managed with IV and oral dilaudid, however changes to med regimen made d/t effects on respiratory status. Try to minimize narcotic usage if able. Pt was unable to have surgery to replace wound vac dressing this morning, therefore Dr Bullock removed it at bedside. Plan is to have TID dressing changes until Monday. Pt up to chair with Ao2. PT ordered. Lasix given with good results, garrison in place. Nebs Q4, LS still wheezy. Nonproductive cough. CTM

## 2022-03-25 NOTE — PROGRESS NOTES
RT note:    Patient uses bipap at night; 3lpm NC day.  She is receiving Duoneb Q4 w/a.  RT to follow.    Madison Larsen, RT  11:43 AM 03/25/22

## 2022-03-25 NOTE — PROVIDER NOTIFICATION
Paged hospitalist: Lab contacted concerned for low hgb w/ABGs. Conditional hgb ordered and is 6.4. Pt thought fluid overload this am w/ hgb of 6.1. recheck 7.1. No type/cross or consent. Please advise.  Awaiting reply  Replied: see note from hospitalist

## 2022-03-25 NOTE — PROGRESS NOTES
03/25/22 1031   Quick Adds   Type of Visit Initial PT Evaluation   Living Environment   People in Home significant other  (Bacilio)   Current Living Arrangements house   Home Accessibility stairs to enter home   Number of Stairs, Main Entrance 5   Stair Railings, Main Entrance railings safe and in good condition   Transportation Anticipated car, drives self;family or friend will provide   Living Environment Comments main level living once in home   Self-Care   Usual Activity Tolerance moderate   Current Activity Tolerance fair   Equipment Currently Used at Home cane, straight  (when out of home)   Fall history within last six months yes   Number of times patient has fallen within last six months 1   Activity/Exercise/Self-Care Comment normally independent with mobility and cares   General Information   Onset of Illness/Injury or Date of Surgery 03/21/22   Referring Physician Gabriele Bullock MD   Patient/Family Therapy Goals Statement (PT) not stated   Pertinent History of Current Problem (include personal factors and/or comorbidities that impact the POC) per chart: Elizabeth Kelley is a 76 year old female with PMHx MS, chronic BLE lymphedema with venous insufficiency and chronic R leg cellulitis, GERD, hyperlipidemia, and depression who underwent previously scheduled excisional debridement of RLE cicumferential venous hypertensive ulceration and application of wound vac on 3/21/22. Hospitalist service re-consulted 3/23/22 for decreased responsiveness, meeting sepsis criteria with suspected pneumonia as source. See medical record for further information   Existing Precautions/Restrictions fall;oxygen therapy device and L/min  (3L)   General Observations R LE wound; LE's elevated   Cognition   Orientation Status (Cognition) person  (didn't know place; knew month but not year)   Safety Deficit (Cognition) safety precautions awareness;safety precautions follow-through/compliance   Memory Deficit (Cognition) other  (see comments)  (reports some baseline memory impairment)   Pain Assessment   Patient Currently in Pain Yes, see Vital Sign flowsheet  (R leg; minimal)   Integumentary/Edema   Integumentary/Edema Comments R LE with wound on lower leg   Posture    Posture Comments forward flexed at head and trunk   Range of Motion (ROM)   ROM Comment LE's limited by wound on R LE and size/weakness; UE's WFL   Strength (Manual Muscle Testing)   Strength Comments functional weakness noted with mobility   Bed Mobility   Comment, (Bed Mobility) NT; patient up in recliner chair   Transfers   Comment, (Transfers) sit>stand with max A x 2; return to sit with min A x 2   Gait/Stairs (Locomotion)   Comment, (Gait/Stairs) able to ambulate in room with use of walker and min A x 2   Balance   Balance Comments impaired; LOB backward when coming to stand; use of walker and assist   Sensory Examination   Sensory Perception Comments reports some tingling in arms and legs   Clinical Impression   Criteria for Skilled Therapeutic Intervention Yes, treatment indicated   PT Diagnosis (PT) impaired functional mobility   Influenced by the following impairments weakness; R LE wound; pain; BMI; decreased activity tolerance; some impaired cognition   Functional limitations due to impairments impaired independence with functional mobility and cares secondary to above deficits   Clinical Presentation (PT Evaluation Complexity) Evolving/Changing   Clinical Presentation Rationale clinical judgement; level of assist   Clinical Decision Making (Complexity) moderate complexity   Planned Therapy Interventions (PT) bed mobility training;gait training;stair training;strengthening;transfer training   Anticipated Equipment Needs at Discharge (PT) walker, rolling;wheelchair   Risk & Benefits of therapy have been explained evaluation/treatment results reviewed;care plan/treatment goals reviewed;risks/benefits reviewed;current/potential barriers reviewed;participants voiced  agreement with care plan;patient;participants included   PT Discharge Planning   PT Discharge Recommendation (DC Rec) Transitional Care Facility;home with assist;home with home care physical therapy   PT Rationale for DC Rec Patient significantly below reported baseline level of function and would benefit from ongoing PT in the hospital and at rehab to maximize return to PLOF; if patient progresses and significant other able to provide assist, may be able to return home with his assist and Home PT; currently significant other works outside the home part of the week.   PT Brief overview of current status Ax2 for standing and walking short distances in room with walker   Plan of Care Review   Plan of Care Reviewed With patient   Total Evaluation Time   Total Evaluation Time (Minutes) 10   Physical Therapy Goals   PT Frequency Daily   PT Predicated Duration/Target Date for Goal Attainment 04/01/22   PT Goals Bed Mobility;Transfers;Gait;Stairs   PT: Bed Mobility Independent;Supine to/from sit;Rolling   PT: Transfers Supervision/stand-by assist;Sit to/from stand;Bed to/from chair;Assistive device   PT: Gait Supervision/stand-by assist;Rolling walker;100 feet   PT: Stairs Supervision/stand-by assist;5 stairs;Assistive device  (wwith use of rail)

## 2022-03-26 ENCOUNTER — APPOINTMENT (OUTPATIENT)
Dept: PHYSICAL THERAPY | Facility: CLINIC | Age: 77
DRG: 264 | End: 2022-03-26
Attending: SURGERY
Payer: COMMERCIAL

## 2022-03-26 LAB
ANION GAP SERPL CALCULATED.3IONS-SCNC: 6 MMOL/L (ref 3–14)
BUN SERPL-MCNC: 24 MG/DL (ref 7–30)
CALCIUM SERPL-MCNC: 8.8 MG/DL (ref 8.5–10.1)
CHLORIDE BLD-SCNC: 103 MMOL/L (ref 94–109)
CO2 SERPL-SCNC: 30 MMOL/L (ref 20–32)
CREAT SERPL-MCNC: 1.03 MG/DL (ref 0.52–1.04)
ERYTHROCYTE [DISTWIDTH] IN BLOOD BY AUTOMATED COUNT: 15.6 % (ref 10–15)
GFR SERPL CREATININE-BSD FRML MDRD: 57 ML/MIN/1.73M2
GLUCOSE BLD-MCNC: 102 MG/DL (ref 70–99)
HCT VFR BLD AUTO: 23.3 % (ref 35–47)
HGB BLD-MCNC: 7.1 G/DL (ref 11.7–15.7)
LACTATE SERPL-SCNC: 1.2 MMOL/L (ref 0.7–2)
MCH RBC QN AUTO: 26 PG (ref 26.5–33)
MCHC RBC AUTO-ENTMCNC: 30.5 G/DL (ref 31.5–36.5)
MCV RBC AUTO: 85 FL (ref 78–100)
PLATELET # BLD AUTO: 328 10E3/UL (ref 150–450)
POTASSIUM BLD-SCNC: 3.4 MMOL/L (ref 3.4–5.3)
POTASSIUM BLD-SCNC: 3.5 MMOL/L (ref 3.4–5.3)
RBC # BLD AUTO: 2.73 10E6/UL (ref 3.8–5.2)
SODIUM SERPL-SCNC: 139 MMOL/L (ref 133–144)
WBC # BLD AUTO: 8.2 10E3/UL (ref 4–11)

## 2022-03-26 PROCEDURE — 250N000011 HC RX IP 250 OP 636: Performed by: PHYSICIAN ASSISTANT

## 2022-03-26 PROCEDURE — 85027 COMPLETE CBC AUTOMATED: CPT | Performed by: INTERNAL MEDICINE

## 2022-03-26 PROCEDURE — 250N000013 HC RX MED GY IP 250 OP 250 PS 637: Performed by: SURGERY

## 2022-03-26 PROCEDURE — 94640 AIRWAY INHALATION TREATMENT: CPT | Mod: 76

## 2022-03-26 PROCEDURE — 80048 BASIC METABOLIC PNL TOTAL CA: CPT | Performed by: INTERNAL MEDICINE

## 2022-03-26 PROCEDURE — 94640 AIRWAY INHALATION TREATMENT: CPT

## 2022-03-26 PROCEDURE — 97116 GAIT TRAINING THERAPY: CPT | Mod: GP | Performed by: PHYSICAL THERAPIST

## 2022-03-26 PROCEDURE — 94660 CPAP INITIATION&MGMT: CPT

## 2022-03-26 PROCEDURE — 120N000013 HC R&B IMCU

## 2022-03-26 PROCEDURE — 250N000013 HC RX MED GY IP 250 OP 250 PS 637: Performed by: INTERNAL MEDICINE

## 2022-03-26 PROCEDURE — 83605 ASSAY OF LACTIC ACID: CPT | Performed by: INTERNAL MEDICINE

## 2022-03-26 PROCEDURE — 99233 SBSQ HOSP IP/OBS HIGH 50: CPT | Performed by: INTERNAL MEDICINE

## 2022-03-26 PROCEDURE — 36415 COLL VENOUS BLD VENIPUNCTURE: CPT | Performed by: INTERNAL MEDICINE

## 2022-03-26 PROCEDURE — 999N000157 HC STATISTIC RCP TIME EA 10 MIN

## 2022-03-26 PROCEDURE — 97530 THERAPEUTIC ACTIVITIES: CPT | Mod: GP | Performed by: PHYSICAL THERAPIST

## 2022-03-26 PROCEDURE — 250N000013 HC RX MED GY IP 250 OP 250 PS 637: Performed by: PHYSICIAN ASSISTANT

## 2022-03-26 PROCEDURE — 84132 ASSAY OF SERUM POTASSIUM: CPT | Performed by: INTERNAL MEDICINE

## 2022-03-26 PROCEDURE — 250N000009 HC RX 250: Performed by: PHYSICIAN ASSISTANT

## 2022-03-26 PROCEDURE — 250N000011 HC RX IP 250 OP 636: Performed by: SURGERY

## 2022-03-26 PROCEDURE — 250N000011 HC RX IP 250 OP 636: Performed by: INTERNAL MEDICINE

## 2022-03-26 RX ORDER — CALCIUM CARBONATE 500 MG/1
500 TABLET, CHEWABLE ORAL 3 TIMES DAILY PRN
Status: DISCONTINUED | OUTPATIENT
Start: 2022-03-26 | End: 2022-04-29 | Stop reason: HOSPADM

## 2022-03-26 RX ADMIN — B-COMPLEX W/ C & FOLIC ACID TAB 1 TABLET: TAB at 08:12

## 2022-03-26 RX ADMIN — Medication 1 PACKET: at 09:13

## 2022-03-26 RX ADMIN — SPIRONOLACTONE 25 MG: 25 TABLET ORAL at 08:13

## 2022-03-26 RX ADMIN — IPRATROPIUM BROMIDE AND ALBUTEROL SULFATE 3 ML: .5; 3 SOLUTION RESPIRATORY (INHALATION) at 12:00

## 2022-03-26 RX ADMIN — ROPINIROLE HYDROCHLORIDE 0.5 MG: 0.5 TABLET, FILM COATED ORAL at 11:14

## 2022-03-26 RX ADMIN — BUPROPION HYDROCHLORIDE 150 MG: 150 TABLET, EXTENDED RELEASE ORAL at 11:11

## 2022-03-26 RX ADMIN — GABAPENTIN 300 MG: 300 CAPSULE ORAL at 21:51

## 2022-03-26 RX ADMIN — CITALOPRAM HYDROBROMIDE 40 MG: 20 TABLET, FILM COATED ORAL at 08:09

## 2022-03-26 RX ADMIN — SIMVASTATIN 40 MG: 40 TABLET, FILM COATED ORAL at 21:51

## 2022-03-26 RX ADMIN — ROPINIROLE HYDROCHLORIDE 0.5 MG: 0.5 TABLET, FILM COATED ORAL at 16:22

## 2022-03-26 RX ADMIN — ENOXAPARIN SODIUM 40 MG: 40 INJECTION SUBCUTANEOUS at 11:15

## 2022-03-26 RX ADMIN — ACETAMINOPHEN 650 MG: 325 TABLET, FILM COATED ORAL at 11:45

## 2022-03-26 RX ADMIN — PREGABALIN 100 MG: 100 CAPSULE ORAL at 20:43

## 2022-03-26 RX ADMIN — GABAPENTIN 300 MG: 300 CAPSULE ORAL at 08:13

## 2022-03-26 RX ADMIN — GUAIFENESIN 10 ML: 100 SOLUTION ORAL at 11:12

## 2022-03-26 RX ADMIN — HYDROMORPHONE HYDROCHLORIDE 2 MG: 2 TABLET ORAL at 08:13

## 2022-03-26 RX ADMIN — PIPERACILLIN SODIUM AND TAZOBACTAM SODIUM 3.38 G: 3; .375 INJECTION, POWDER, LYOPHILIZED, FOR SOLUTION INTRAVENOUS at 17:58

## 2022-03-26 RX ADMIN — IPRATROPIUM BROMIDE AND ALBUTEROL SULFATE 3 ML: .5; 3 SOLUTION RESPIRATORY (INHALATION) at 19:26

## 2022-03-26 RX ADMIN — CALCIUM CARBONATE (ANTACID) CHEW TAB 500 MG 500 MG: 500 CHEW TAB at 04:33

## 2022-03-26 RX ADMIN — POTASSIUM CHLORIDE 20 MEQ: 1500 TABLET, EXTENDED RELEASE ORAL at 20:43

## 2022-03-26 RX ADMIN — HYDROMORPHONE HYDROCHLORIDE 0.2 MG: 0.2 INJECTION, SOLUTION INTRAMUSCULAR; INTRAVENOUS; SUBCUTANEOUS at 20:43

## 2022-03-26 RX ADMIN — HYDROMORPHONE HYDROCHLORIDE 2 MG: 2 TABLET ORAL at 11:46

## 2022-03-26 RX ADMIN — PIPERACILLIN SODIUM AND TAZOBACTAM SODIUM 3.38 G: 3; .375 INJECTION, POWDER, LYOPHILIZED, FOR SOLUTION INTRAVENOUS at 11:47

## 2022-03-26 RX ADMIN — PIPERACILLIN SODIUM AND TAZOBACTAM SODIUM 3.38 G: 3; .375 INJECTION, POWDER, LYOPHILIZED, FOR SOLUTION INTRAVENOUS at 05:20

## 2022-03-26 RX ADMIN — ACETAMINOPHEN 650 MG: 325 TABLET, FILM COATED ORAL at 16:32

## 2022-03-26 RX ADMIN — MULTIPLE VITAMINS W/ MINERALS TAB 1 TABLET: TAB at 08:09

## 2022-03-26 RX ADMIN — POTASSIUM CHLORIDE 40 MEQ: 1500 TABLET, EXTENDED RELEASE ORAL at 11:12

## 2022-03-26 RX ADMIN — IPRATROPIUM BROMIDE AND ALBUTEROL SULFATE 3 ML: .5; 3 SOLUTION RESPIRATORY (INHALATION) at 23:50

## 2022-03-26 RX ADMIN — ROPINIROLE HYDROCHLORIDE 1 MG: 0.5 TABLET, FILM COATED ORAL at 21:50

## 2022-03-26 RX ADMIN — PREGABALIN 100 MG: 100 CAPSULE ORAL at 11:46

## 2022-03-26 RX ADMIN — HYDROMORPHONE HYDROCHLORIDE 0.2 MG: 0.2 INJECTION, SOLUTION INTRAMUSCULAR; INTRAVENOUS; SUBCUTANEOUS at 11:15

## 2022-03-26 RX ADMIN — FUROSEMIDE 10 MG: 20 TABLET ORAL at 16:21

## 2022-03-26 RX ADMIN — PANTOPRAZOLE SODIUM 40 MG: 40 TABLET, DELAYED RELEASE ORAL at 08:10

## 2022-03-26 RX ADMIN — IPRATROPIUM BROMIDE AND ALBUTEROL SULFATE 3 ML: .5; 3 SOLUTION RESPIRATORY (INHALATION) at 15:56

## 2022-03-26 RX ADMIN — GABAPENTIN 300 MG: 300 CAPSULE ORAL at 16:21

## 2022-03-26 RX ADMIN — Medication 250 MCG: at 08:11

## 2022-03-26 RX ADMIN — CYANOCOBALAMIN TAB 1000 MCG 1000 MCG: 1000 TAB at 08:13

## 2022-03-26 RX ADMIN — IPRATROPIUM BROMIDE AND ALBUTEROL SULFATE 3 ML: .5; 3 SOLUTION RESPIRATORY (INHALATION) at 08:03

## 2022-03-26 RX ADMIN — Medication 1 CAPSULE: at 21:50

## 2022-03-26 RX ADMIN — Medication 1 CAPSULE: at 09:12

## 2022-03-26 ASSESSMENT — ACTIVITIES OF DAILY LIVING (ADL)
ADLS_ACUITY_SCORE: 10
ADLS_ACUITY_SCORE: 12
ADLS_ACUITY_SCORE: 10
ADLS_ACUITY_SCORE: 12
ADLS_ACUITY_SCORE: 10

## 2022-03-26 NOTE — PLAN OF CARE
Care provided from [id number]    Neuro: alert and oriented x4  CV: SR, pedal pulses present with Doppler.  Resp: 2L N/C,   Gi:abdomen, soft nontender, bowel sounds present   : voiding, catheter in place  Skin BLE edema, right lower extremity wound. Wound care provided x1 Kerlix in place, scant drainage. Compression wraps in place.   mobility: assist x2  Pain: manage with PRN Tylenol & Dilaudid

## 2022-03-26 NOTE — PROVIDER NOTIFICATION
MD Notification    Notified Person: MD    Notified Person Name:  Aleta Boo    Notification Date/Time: 3/26/22 0428    Notification Interaction: [employer]    Purpose of Notification:  Patient complaining of heartburn and requesting for TUMS, no current prn order.     Orders Received:    Comments:

## 2022-03-26 NOTE — PLAN OF CARE
NEURO: alert and oriented x4 but forgetful  CV: NSR, pedal pulses present with Doppler.  RESP: 2L, N/C, BIPAP overnight, incentive spirometer done.  Gi:abdomen, soft nontender, bowel sounds present in all 4.   : voiding, catheter in place  SKIN: BLE edema, right lower extremity wound. Kerlix in place, scant drainage.  MOBILITY/ACTIVITY: assist x2  PAIN: manage with PRN Tylenol & Dilaudid

## 2022-03-26 NOTE — PLAN OF CARE
[id number] Alert and oriented x4. Vital signs stable on 1-3L O2 via NC. On Bipap overnight. Up with 2, Gb/walker. Tolerating diet. LS diminished. IS @1000. BS +. BM-. Voiding adequately via garrison. RLE wound, dressing changed. Elevated on wedge. Pain controlled with PRN Dilaudid 0.2mg.  Denies nausea. Tele NSR.

## 2022-03-26 NOTE — PROGRESS NOTES
[facility]    Medicine Progress Note - Hospitalist Service    Date of Admission:  3/21/2022    Assessment & Plan          Elizabeth Kelley is a 76 year old female with PMHx MS, chronic BLE lymphedema with venous insufficiency and chronic R leg cellulitis, GERD, hyperlipidemia, and depression who underwent previously scheduled excisional debridement of RLE cicumferential venous hypertensive ulceration and application of wound vac on 3/21/22. Hospitalist service re-consulted 3/23/22 for decreased responsiveness, meeting sepsis criteria with suspected pneumonia as source.      Sepsis secondary to CAP vs aspiration pneumonia  Acute hypoxic respiratory failure secondary to above, improved  Hx of MRSA: Tmax 101.5, tachycardic in the 110s, hypoxic to 86% on room air. WBC 18.3, procal 0.17. Lactic 1.3. UA with trace LE, WBC 7. . VBG 7.33/54/57/28. CXR with patchy airspace opacities in the left mid and lower lung, suspicious for pneumonia.  Highest suspicion for pneumonia given the above, though note hx of recurrent RLE cellulitis with procedure as above. Hx of MRSA. RLE with wound vac in place.   - Moved [place] [facility] [place] on [place]   - Volume resucitated to 3660 ccs per sepsis protocol   - Started IV Zosyn + Vancomycin (received 2 doses of Ancef devon and post-op)  - Blood cultures- NGTD  - RCAT consulted, encourage pulmonary toilet with IS and acapella   - Duonebs q4 hrs while awake, PRN albuterol nebs   - afebrile today, WBCs trending down 18.3--11.9--9  - 3/24- was wheezy in am, possible some fluid overload  - Lasix 40 mg ivX1 given  - monitor fluid status  - resumed PTA Lasix and Aldactone  - Vanco stopped on 3/25; continue Zosyn for now, day 4  - aggressive IS, pulm toilet  - Robitussin prn  - supplemental O2 prn, currently on RA   - monitor fluid status, may need additional Lasix if evidence of fluid overload  - SLP consult- no evidence of dysphagia     Encephalopathy, multifactorial- resolved  Acute  hypoxic and hypercapnic respiratory failure- multifactorial  - Altered mentation noted by nursing yesterday  - Multifactorial in the setting of fever, narcotic pain medications, suspected underlying BOLIVAR, possible dehydration. Received dose of narcan, but did not significantly change pts mentation immediately, though seemed to improve within the hour after IVF bolus was near completion. Oriented X2 initially on my exam, drifting to sleep midway through conversation, but easily arousable. Mentation improved upon reevaluation within 30 minutes.   - Treat infection as above   - 3/24- she was awake and alert in am, later on - noted to be more hypoxic and sleepy  - she received Dilaudid o.2 mg ivX2 earlier with dressing changes  - VBG with pH 7.27, pCO2 66  - started on BiPAP  - later on- mentation improved; repeat VBG pH 7.3, pCO2 62    - Judicious use or narcotics   - hold PTA Lyrica, Wellbutrin, Requip (BID during the day, keep Requip at bedtime)  - suspect underlying undiagnosed BOLIVAR based on body habitus  - was on BiPAP overnight, now on RA, awake, alert, comfortable  - mentation back to baseline; will resume PTA Wellbutrin, Lyrica and Requip as per the patient's request but will closely monitor mentation, especially if she will get pain medications.   - BiPAP prn  - continue IMC status for now  - sleep studies as outpatient recommended     Acute blood loss anemia  Chronic normocytic anemia  - Baseline 10-11. Down to 8.3 post-procedure. No active bleeding. Could be dilutional as checked while receiving IVF bolus   - 3/24- Hb down to 6.1 but repeated Hb 7.1, again down to 6.4   - EBL during surgery 100cc  - transfused 1 unit PRBCs  - Hb up to 7.1--7.1  - would transfuse if Hb<7  - Hold PTA ASA 81 mg po for now but plan to start Lovenox 40 mg subcut daily for DVT/px  - discontinued scheduled Celebrex  - CBC in am     Chronic BLE lymphedema with venous insufficiency and chronic R leg cellulitis   S/p excisional  "debridement of RLE cicumferential venous hypertensive ulceration and application of wound vac (3/21/22)  - Defer routine post-operative cares to Dr. Bullock   - pain management- may have difficulty with pain control given her fluctuating mentation; reported severe pain with dressing changes, Dilaudid 0.2 mg ivX2 given earlier followed by some AMS and evidence of CO2 retention  - discussed with Dr Bullock, started her on Gabapentin 300 mg po TID  - also has Toradol available q6h prn (monitor Hb)  - hold PTA Lyrica for now given AMS, consider resuming it if mentation remains stable  - Plan is to have TID dressing changes until Monday; WOC consult as RN does not have orders for wound care  - Plan for another trip to the OR once medically optimized   - PTA Lasix and Aldocatone held 2 days ago while receiving IVF for sepsis; now resumed PTA Lasix 20 mg po BID and Aldactone 25 mg po daily  - will also resume PTA KCl supplementation 40 meq po in am and 20 mEq po every evening    Severe Obesity: Estimated body mass index is 50.81 kg/m  as calculated from the following:    Height as of this encounter: 1.549 m (5' 1\").    Weight as of this encounter: 122 kg (268 lb 14.4 oz).       MS: Ambulatory at baseline, though some weakness in lower extremities and intermittent upper extremity weakness.     Hyponatremia, mild, resolved  - Na 132 on 3/23--improved to 136--139    GERD: Continue Protonix   Hyperlipidemia: Continue statin   Depression: Continued Celexa; PTA Wellbutrin and Lyrica held few days ago given AMS, will resume today as mentation back to baseline.  RLS:  PTA scheduled Requip during the daytime held when she had AMS; she asks to resume PTA Requip 0.5 mg po BID during the day and 1 mg po at bedtime.       Diet: Regular Diet Adult    DVT Prophylaxis: Lovenox 40 mg subcut daily  Amato Catheter: PRESENT, indication: Strict 1-2 Hour I&O  Central Lines: None  Cardiac Monitoring: ACTIVE order. Indication: hypoxic hypercapneic " "resp failure  Code Status: Full Code      Disposition Plan   Expected Discharge: 04/04/2022     Anticipated discharge location: home with family    Delays:         The patient's care was discussed with the Bedside Nurse and Patient.    Yessica Meehan MD  Hospitalist Service  [facility]  Securely message with the Vocera Web Console (learn more here)  Text page via [employer] Paging/Directory         Clinically Significant Risk Factors Present on Admission             # Severe Obesity: Estimated body mass index is 48.24 kg/m  as calculated from the following:    Height as of this encounter: 1.549 m (5' 1\").    Weight as of this encounter: 115.8 kg (255 lb 4.7 oz).      ______________________________________________________________________    Interval History    Doing fine, used BiPAP overnight, now on RA  - no chest pain, no SOB  - reports some congestive cough  - no N/V, no abd pain  - LRLE pain reasonable controlled      Data reviewed today: I reviewed all medications, new labs and imaging results over the last 24 hours. I personally reviewed no images or EKG's today.    Physical Exam   Vital Signs: Temp: 98.4  F (36.9  C) Temp src: Oral BP: 125/58 Pulse: 75   Resp: 21 SpO2: 97 % O2 Device: Nasal cannula Oxygen Delivery: 2 LPM  Weight: 255 lbs 4.68 oz    CONSTITUTIONAL: awake, alert, laying in bed, NAD  HEENT: Normocephalic, atraumatic.   CARDIOVASCULAR: S1S2, RRR, no murmurs  RESPIRATORY: diminished air entry at bases, mild bibasilar crackles, no wheezing, no wheezing, no rales.  GASTROINTESTINAL:  Abdomen soft, obese, non-distended. BS auscultated in all four quadrants. Negative for tenderness to palpation.  No masses or organomegaly noted.  MUSCULOSKELETAL: No gross deformities noted. Normal muscle tone.   EXTREMITIES: RLE covered with dressing, oozing some serosanguinous discharge, LLE- chronic indurated skin changes  NEUROLOGIC: Alert and oriented to person, place, and time.  " strength intact. No focal neuro deficits.   PSYCH- normal mood       Data   Recent Labs   Lab 03/26/22  0736 03/25/22  1126 03/25/22  0838 03/25/22  0603 03/24/22  1123 03/24/22  0922 03/24/22  0921 03/24/22  0534 03/23/22  1814   WBC 8.2  --   --  9.0  --  11.9*  --   --   --    HGB 7.1*  --  7.1* 6.7*   < > 6.1*  --   --   --    MCV 85  --   --  85  --  89  --   --   --      --   --  283  --  269  --   --   --      --   --  136  --   --  134  --   --    POTASSIUM 3.4  --   --  3.4  --   --  4.2  --   --    CHLORIDE 103  --   --  103  --   --  104  --   --    CO2 30  --   --  28  --   --  26  --   --    BUN 24  --   --  28  --   --  31*  --   --    CR 1.03  --   --  1.07*  --   --  0.95   < >  --    ANIONGAP 6  --   --  5  --   --  4  --   --    JUNI 8.8  --   --  8.7  --   --  8.7  --   --    * 100*  --  115*  --   --  154*   < >  --    ALBUMIN  --   --   --   --   --   --   --   --  2.0*   PROTTOTAL  --   --   --   --   --   --   --   --  5.8*   BILITOTAL  --   --   --   --   --   --   --   --  0.2   ALKPHOS  --   --   --   --   --   --   --   --  156*   ALT  --   --   --   --   --   --   --   --  29   AST  --   --   --   --   --   --   --   --  63*    < > = values in this interval not displayed.     No results found for this or any previous visit (from the past 24 hour(s)).  Medications     - MEDICATION INSTRUCTIONS -       - MEDICATION INSTRUCTIONS -         [Held by provider] aspirin  81 mg Oral QPM     buPROPion  150 mg Oral QAM     citalopram  40 mg Oral QAM     cyanocobalamin  1,000 mcg Oral Daily     diosmin-hesperidin 450-50  1 capsule Oral BID     enoxaparin ANTICOAGULANT  40 mg Subcutaneous Q24H     furosemide  10 mg Oral BID     gabapentin  300 mg Oral TID     ipratropium - albuterol 0.5 mg/2.5 mg/3 mL  3 mL Nebulization Q4H While awake     Aleksandr  1 packet Oral BID     multivitamin w/minerals  1 tablet Oral Daily     pantoprazole  40 mg Oral Daily     piperacillin-tazobactam  3.375 g  Intravenous Q6H     potassium chloride ER  20 mEq Oral QPM     potassium chloride ER  40 mEq Oral QAM     pregabalin  100 mg Oral BID     rOPINIRole  0.5 mg Oral BID     rOPINIRole  1 mg Oral At Bedtime     simvastatin  40 mg Oral At Bedtime     sodium chloride (PF)  3 mL Intracatheter Q8H     spironolactone  25 mg Oral Daily     vitamin B complex with vitamin C  1 tablet Oral Daily     Vitamin D3  250 mcg Oral Daily

## 2022-03-27 ENCOUNTER — APPOINTMENT (OUTPATIENT)
Dept: PHYSICAL THERAPY | Facility: CLINIC | Age: 77
DRG: 264 | End: 2022-03-27
Attending: SURGERY
Payer: COMMERCIAL

## 2022-03-27 LAB
ANION GAP SERPL CALCULATED.3IONS-SCNC: 3 MMOL/L (ref 3–14)
BLD PROD TYP BPU: NORMAL
BLOOD COMPONENT TYPE: NORMAL
BUN SERPL-MCNC: 24 MG/DL (ref 7–30)
CALCIUM SERPL-MCNC: 8.9 MG/DL (ref 8.5–10.1)
CHLORIDE BLD-SCNC: 104 MMOL/L (ref 94–109)
CO2 SERPL-SCNC: 30 MMOL/L (ref 20–32)
CODING SYSTEM: NORMAL
CREAT SERPL-MCNC: 0.98 MG/DL (ref 0.52–1.04)
CROSSMATCH: NORMAL
ERYTHROCYTE [DISTWIDTH] IN BLOOD BY AUTOMATED COUNT: 15.7 % (ref 10–15)
GFR SERPL CREATININE-BSD FRML MDRD: 60 ML/MIN/1.73M2
GLUCOSE BLD-MCNC: 110 MG/DL (ref 70–99)
HCT VFR BLD AUTO: 23 % (ref 35–47)
HCT VFR BLD AUTO: 24.8 % (ref 35–47)
HGB BLD-MCNC: 6.7 G/DL (ref 11.7–15.7)
HGB BLD-MCNC: 7.5 G/DL (ref 11.7–15.7)
ISSUE DATE AND TIME: NORMAL
MCH RBC QN AUTO: 25.8 PG (ref 26.5–33)
MCHC RBC AUTO-ENTMCNC: 29.1 G/DL (ref 31.5–36.5)
MCV RBC AUTO: 89 FL (ref 78–100)
PLATELET # BLD AUTO: 344 10E3/UL (ref 150–450)
POTASSIUM BLD-SCNC: 4 MMOL/L (ref 3.4–5.3)
RBC # BLD AUTO: 2.6 10E6/UL (ref 3.8–5.2)
SODIUM SERPL-SCNC: 137 MMOL/L (ref 133–144)
UNIT ABO/RH: NORMAL
UNIT NUMBER: NORMAL
UNIT STATUS: NORMAL
UNIT TYPE ISBT: 6200
WBC # BLD AUTO: 9.7 10E3/UL (ref 4–11)

## 2022-03-27 PROCEDURE — 250N000011 HC RX IP 250 OP 636: Performed by: INTERNAL MEDICINE

## 2022-03-27 PROCEDURE — 250N000013 HC RX MED GY IP 250 OP 250 PS 637: Performed by: PHYSICIAN ASSISTANT

## 2022-03-27 PROCEDURE — P9016 RBC LEUKOCYTES REDUCED: HCPCS | Performed by: INTERNAL MEDICINE

## 2022-03-27 PROCEDURE — 94640 AIRWAY INHALATION TREATMENT: CPT

## 2022-03-27 PROCEDURE — 36415 COLL VENOUS BLD VENIPUNCTURE: CPT | Performed by: INTERNAL MEDICINE

## 2022-03-27 PROCEDURE — 999N000157 HC STATISTIC RCP TIME EA 10 MIN

## 2022-03-27 PROCEDURE — 250N000011 HC RX IP 250 OP 636: Performed by: PHYSICIAN ASSISTANT

## 2022-03-27 PROCEDURE — 97116 GAIT TRAINING THERAPY: CPT | Mod: GP

## 2022-03-27 PROCEDURE — 97530 THERAPEUTIC ACTIVITIES: CPT | Mod: GP

## 2022-03-27 PROCEDURE — 82310 ASSAY OF CALCIUM: CPT | Performed by: INTERNAL MEDICINE

## 2022-03-27 PROCEDURE — 94640 AIRWAY INHALATION TREATMENT: CPT | Mod: 76

## 2022-03-27 PROCEDURE — 250N000013 HC RX MED GY IP 250 OP 250 PS 637: Performed by: INTERNAL MEDICINE

## 2022-03-27 PROCEDURE — 85018 HEMOGLOBIN: CPT | Performed by: INTERNAL MEDICINE

## 2022-03-27 PROCEDURE — 120N000013 HC R&B IMCU

## 2022-03-27 PROCEDURE — 250N000009 HC RX 250: Performed by: PHYSICIAN ASSISTANT

## 2022-03-27 PROCEDURE — 250N000011 HC RX IP 250 OP 636: Performed by: SURGERY

## 2022-03-27 PROCEDURE — 99233 SBSQ HOSP IP/OBS HIGH 50: CPT | Performed by: INTERNAL MEDICINE

## 2022-03-27 PROCEDURE — 250N000013 HC RX MED GY IP 250 OP 250 PS 637: Performed by: SURGERY

## 2022-03-27 RX ORDER — FUROSEMIDE 10 MG/ML
40 INJECTION INTRAMUSCULAR; INTRAVENOUS ONCE
Status: COMPLETED | OUTPATIENT
Start: 2022-03-27 | End: 2022-03-27

## 2022-03-27 RX ADMIN — ACETAMINOPHEN 650 MG: 325 TABLET, FILM COATED ORAL at 17:34

## 2022-03-27 RX ADMIN — HYDROMORPHONE HYDROCHLORIDE 0.2 MG: 0.2 INJECTION, SOLUTION INTRAMUSCULAR; INTRAVENOUS; SUBCUTANEOUS at 12:36

## 2022-03-27 RX ADMIN — PIPERACILLIN SODIUM AND TAZOBACTAM SODIUM 3.38 G: 3; .375 INJECTION, POWDER, LYOPHILIZED, FOR SOLUTION INTRAVENOUS at 00:59

## 2022-03-27 RX ADMIN — IPRATROPIUM BROMIDE AND ALBUTEROL SULFATE 3 ML: .5; 3 SOLUTION RESPIRATORY (INHALATION) at 08:06

## 2022-03-27 RX ADMIN — IPRATROPIUM BROMIDE AND ALBUTEROL SULFATE 3 ML: .5; 3 SOLUTION RESPIRATORY (INHALATION) at 11:49

## 2022-03-27 RX ADMIN — ACETAMINOPHEN 650 MG: 325 TABLET, FILM COATED ORAL at 08:39

## 2022-03-27 RX ADMIN — ROPINIROLE HYDROCHLORIDE 0.5 MG: 0.5 TABLET, FILM COATED ORAL at 12:37

## 2022-03-27 RX ADMIN — IPRATROPIUM BROMIDE AND ALBUTEROL SULFATE 3 ML: .5; 3 SOLUTION RESPIRATORY (INHALATION) at 15:52

## 2022-03-27 RX ADMIN — B-COMPLEX W/ C & FOLIC ACID TAB 1 TABLET: TAB at 08:42

## 2022-03-27 RX ADMIN — HYDROMORPHONE HYDROCHLORIDE 2 MG: 2 TABLET ORAL at 22:30

## 2022-03-27 RX ADMIN — PIPERACILLIN SODIUM AND TAZOBACTAM SODIUM 3.38 G: 3; .375 INJECTION, POWDER, LYOPHILIZED, FOR SOLUTION INTRAVENOUS at 12:36

## 2022-03-27 RX ADMIN — ACETAMINOPHEN 650 MG: 325 TABLET, FILM COATED ORAL at 12:37

## 2022-03-27 RX ADMIN — FUROSEMIDE 40 MG: 10 INJECTION, SOLUTION INTRAVENOUS at 17:36

## 2022-03-27 RX ADMIN — SPIRONOLACTONE 25 MG: 25 TABLET ORAL at 08:42

## 2022-03-27 RX ADMIN — Medication 1 CAPSULE: at 09:34

## 2022-03-27 RX ADMIN — PREGABALIN 100 MG: 100 CAPSULE ORAL at 08:41

## 2022-03-27 RX ADMIN — HYDROMORPHONE HYDROCHLORIDE 2 MG: 2 TABLET ORAL at 17:34

## 2022-03-27 RX ADMIN — GABAPENTIN 300 MG: 300 CAPSULE ORAL at 22:30

## 2022-03-27 RX ADMIN — Medication 250 MCG: at 08:40

## 2022-03-27 RX ADMIN — Medication 1 PACKET: at 08:38

## 2022-03-27 RX ADMIN — CITALOPRAM HYDROBROMIDE 40 MG: 20 TABLET, FILM COATED ORAL at 08:41

## 2022-03-27 RX ADMIN — SIMVASTATIN 40 MG: 40 TABLET, FILM COATED ORAL at 22:30

## 2022-03-27 RX ADMIN — HYDROMORPHONE HYDROCHLORIDE 2 MG: 2 TABLET ORAL at 10:46

## 2022-03-27 RX ADMIN — PREGABALIN 100 MG: 100 CAPSULE ORAL at 22:30

## 2022-03-27 RX ADMIN — ROPINIROLE HYDROCHLORIDE 1 MG: 0.5 TABLET, FILM COATED ORAL at 22:30

## 2022-03-27 RX ADMIN — IPRATROPIUM BROMIDE AND ALBUTEROL SULFATE 3 ML: .5; 3 SOLUTION RESPIRATORY (INHALATION) at 20:20

## 2022-03-27 RX ADMIN — GABAPENTIN 300 MG: 300 CAPSULE ORAL at 17:34

## 2022-03-27 RX ADMIN — MULTIPLE VITAMINS W/ MINERALS TAB 1 TABLET: TAB at 08:42

## 2022-03-27 RX ADMIN — PIPERACILLIN SODIUM AND TAZOBACTAM SODIUM 3.38 G: 3; .375 INJECTION, POWDER, LYOPHILIZED, FOR SOLUTION INTRAVENOUS at 06:29

## 2022-03-27 RX ADMIN — CYANOCOBALAMIN TAB 1000 MCG 1000 MCG: 1000 TAB at 08:41

## 2022-03-27 RX ADMIN — PIPERACILLIN SODIUM AND TAZOBACTAM SODIUM 3.38 G: 3; .375 INJECTION, POWDER, LYOPHILIZED, FOR SOLUTION INTRAVENOUS at 18:00

## 2022-03-27 RX ADMIN — FUROSEMIDE 10 MG: 20 TABLET ORAL at 08:43

## 2022-03-27 RX ADMIN — BUPROPION HYDROCHLORIDE 150 MG: 150 TABLET, EXTENDED RELEASE ORAL at 08:42

## 2022-03-27 RX ADMIN — ROPINIROLE HYDROCHLORIDE 0.5 MG: 0.5 TABLET, FILM COATED ORAL at 08:42

## 2022-03-27 RX ADMIN — POTASSIUM CHLORIDE 20 MEQ: 1500 TABLET, EXTENDED RELEASE ORAL at 22:30

## 2022-03-27 RX ADMIN — GABAPENTIN 300 MG: 300 CAPSULE ORAL at 08:41

## 2022-03-27 RX ADMIN — POTASSIUM CHLORIDE 40 MEQ: 1500 TABLET, EXTENDED RELEASE ORAL at 08:40

## 2022-03-27 RX ADMIN — Medication 1 CAPSULE: at 10:40

## 2022-03-27 RX ADMIN — PANTOPRAZOLE SODIUM 40 MG: 40 TABLET, DELAYED RELEASE ORAL at 08:40

## 2022-03-27 ASSESSMENT — ACTIVITIES OF DAILY LIVING (ADL)
ADLS_ACUITY_SCORE: 11
ADLS_ACUITY_SCORE: 11
ADLS_ACUITY_SCORE: 12
ADLS_ACUITY_SCORE: 11
ADLS_ACUITY_SCORE: 12
ADLS_ACUITY_SCORE: 11
ADLS_ACUITY_SCORE: 12
ADLS_ACUITY_SCORE: 12
ADLS_ACUITY_SCORE: 11
ADLS_ACUITY_SCORE: 12
ADLS_ACUITY_SCORE: 11
ADLS_ACUITY_SCORE: 12
ADLS_ACUITY_SCORE: 12

## 2022-03-27 NOTE — PROGRESS NOTES
[facility]    Medicine Progress Note - Hospitalist Service    Date of Admission:  3/21/2022    Assessment & Plan          Elizabeth Kelley is a 76 year old female with PMHx MS, chronic BLE lymphedema with venous insufficiency and chronic R leg cellulitis, GERD, hyperlipidemia, and depression who underwent previously scheduled excisional debridement of RLE cicumferential venous hypertensive ulceration and application of wound vac on 3/21/22. Hospitalist service re-consulted 3/23/22 for decreased responsiveness, meeting sepsis criteria with suspected pneumonia as source.      Sepsis secondary to CAP vs aspiration pneumonia  Acute hypoxic respiratory failure secondary to above, improved  Hx of MRSA: Tmax 101.5, tachycardic in the 110s, hypoxic to 86% on room air. WBC 18.3, procal 0.17. Lactic 1.3. UA with trace LE, WBC 7. . VBG 7.33/54/57/28. CXR with patchy airspace opacities in the left mid and lower lung, suspicious for pneumonia.  Highest suspicion for pneumonia given the above, though note hx of recurrent RLE cellulitis with procedure as above. Hx of MRSA. RLE with wound vac in place.   - [place] [facility] [place]   - Volume resucitated to 3660 ccs per sepsis protocol   - Started IV Zosyn + Vancomycin (received 2 doses of Ancef devon and post-op)  - Blood cultures- NGTD  - RCAT consulted, encourage pulmonary toilet with IS and acapella   - Duonebs q4 hrs while awake, PRN albuterol nebs   - afebrile, WBCs trending down 18.3--11.9--9  - 3/24- was wheezy in am, possible some fluid overload  - Lasix 40 mg ivX1 given on 3/24  - monitor fluid status  - resumed PTA Lasix and Aldactone  - Vanco stopped on 3/25; continue Zosyn for now, day 5, would continue ABX for 7 days  - aggressive IS, pulm toilet  - Robitussin prn  - supplemental O2 prn, currently on RA   - monitor fluid status; will give another dose of Lasix 40 mg iv X1 today given plan for blood transfusion; may need additional Lasix if  evidence of fluid overload  - SLP consult- no evidence of dysphagia     Encephalopathy, multifactorial- resolved  Acute hypoxic and hypercapnic respiratory failure- multifactorial  - Altered mentation noted by nursing yesterday  - Multifactorial in the setting of fever, narcotic pain medications, suspected underlying BOLIVAR, possible dehydration. Received dose of narcan, but did not significantly change pts mentation immediately, though seemed to improve within the hour after IVF bolus was near completion. Oriented X2 initially on my exam, drifting to sleep midway through conversation, but easily arousable. Mentation improved upon reevaluation within 30 minutes.   - Treat infection as above   - 3/24- she was awake and alert in am, later on - noted to be more hypoxic and sleepy  - she received Dilaudid o.2 mg ivX2 earlier with dressing changes  - VBG with pH 7.27, pCO2 66  - started on BiPAP  - later on- mentation improved; repeat VBG pH 7.3, pCO2 62    - Judicious use or narcotics   - hold PTA Lyrica, Wellbutrin, Requip (BID during the day, keep Requip at bedtime)  - suspect underlying undiagnosed BOLIVAR based on body habitus  - was on BiPAP overnight, now on RA, awake, alert, comfortable    - 3/26 mentation back to baseline; resumed PTA Wellbutrin, Lyrica and Requip as per the patient's request but will closely monitor mentation, especially if she will get pain medications; low threshold to hold these meds if she becomes confused/lethargic   - BiPAP prn  - continue IMC status for now  - sleep studies as outpatient recommended     Acute blood loss anemia  Chronic normocytic anemia  - Baseline 10-11. Down to 8.3 post-procedure. No active bleeding. Could be dilutional as checked while receiving IVF bolus   - 3/24- Hb down to 6.1 but repeated Hb 7.1, again down to 6.4   - EBL during surgery 100cc  - transfused 1 unit PRBCs  - Hb up to 7.1--7.1--6.7 today  - transfused another unit PRBC--3/27  - Hb later on today and in am  -  "would transfuse if Hb<7  - Hold PTA ASA 81 mg po; started on Lovenox 40 mg subcut daily for DVT/px on 3/260- hold it for now  - discontinued scheduled Celebrex  - CBC in am     Chronic BLE lymphedema with venous insufficiency and chronic R leg cellulitis   S/p excisional debridement of RLE cicumferential venous hypertensive ulceration and application of wound vac (3/21/22)  - Defer routine post-operative cares to Dr. Bullock   - pain management- may have difficulty with pain control given her fluctuating mentation; reported severe pain with dressing changes, Dilaudid 0.2 mg ivX2 given earlier followed by some AMS and evidence of CO2 retention  - discussed with Dr Bullock, started her on Gabapentin 300 mg po TID  - also has Toradol available q6h prn (monitor Hb)  - hold PTA Lyrica for now given AMS, consider resuming it if mentation remains stable  - Plan is to have TID dressing changes until Monday; WOC consult as RN does not have orders for wound care  - Plan for another trip to the OR once medically optimized   - PTA Lasix and Aldocatone held 2 days ago while receiving IVF for sepsis; now resumed PTA Lasix 10 mg po BID and Aldactone 25 mg po daily  - will give an extra dose of Lasix 40 iv X1 today given blood transfusion  - also resumed PTA KCl supplementation 40 meq po in am and 20 mEq po every evening    Severe Obesity: Estimated body mass index is 50.81 kg/m  as calculated from the following:    Height as of this encounter: 1.549 m (5' 1\").    Weight as of this encounter: 122 kg (268 lb 14.4 oz).       MS: Ambulatory at baseline, though some weakness in lower extremities and intermittent upper extremity weakness.     Hyponatremia, mild, resolved  - Na 132 on 3/23--improved to 136--139  - BMP in am    GERD: Continue Protonix     Hyperlipidemia: Continue statin     Depression:PTA Celexa, Wellbutrin and Lyrica held few days ago given AMS, resumed on 3/26 as mentation back to baseline.    RLS:  PTA scheduled Requip " during the daytime held when she had AMS; she asked to resume PTA Requip 0.5 mg po BID during the day and 1 mg po at bedtime.       Diet: NPO per Anesthesia Guidelines for Procedure/Surgery Except for: Meds    DVT Prophylaxis: As per primary  Amato Catheter: PRESENT, indication: Strict 1-2 Hour I&O  Central Lines: None  Cardiac Monitoring: ACTIVE order. Indication: hypoxic hypercapneic resp failure  Code Status: Full Code      Disposition Plan   Expected Discharge: 04/04/2022     Anticipated discharge location: home with family    Delays:         The patient's care was discussed with the Bedside Nurse and Patient.    Yessica Meehan MD  Hospitalist Service  [facility]  Securely message with the Vocera Web Console (learn more here)  Text page via [employer] Paging/Directory     Clinically Significant Risk Factors Present on Admission                    ______________________________________________________________________    Interval History    Doing fine, used BiPAP overnight, now on RA  - Rt LE pain controlled  - no chest pain, no SOB, reports some congestive cough but not able to expectorate  - no N/V, no abd pain      Data reviewed today: I reviewed all medications, new labs and imaging results over the last 24 hours. I personally reviewed no images or EKG's today.    Physical Exam   Vital Signs: Temp: 98.7  F (37.1  C) Temp src: Oral BP: 123/71 Pulse: 74   Resp: 20 SpO2: 99 % O2 Device: None (Room air)    Weight: 277 lbs 5.42 oz    CONSTITUTIONAL: awake, alert, laying in bed, NAD  HEENT: Normocephalic, atraumatic.   CARDIOVASCULAR: S1S2, RRR, no murmurs  RESPIRATORY: diminished air entry at bases, mild bibasilar crackles, no wheezing, no wheezing, no rales.  GASTROINTESTINAL:  Abdomen soft, obese, non-distended. BS auscultated in all four quadrants. Negative for tenderness to palpation.  No masses or organomegaly noted.  MUSCULOSKELETAL: No gross deformities noted. Normal muscle tone.    EXTREMITIES: RLE covered with dressing, oozing some serosanguinous discharge, LLE- chronic indurated skin changes  NEUROLOGIC: Alert and oriented to person, place, and time.  strength intact. No focal neuro deficits.   PSYCH- normal mood       Data   Recent Labs   Lab 03/27/22  0551 03/26/22  1212 03/26/22  0736 03/25/22  1126 03/25/22  0838 03/25/22  0603 03/24/22  0534 03/23/22  1814   WBC 9.7  --  8.2  --   --  9.0   < >  --    HGB 6.7*  --  7.1*  --  7.1* 6.7*   < >  --    MCV 89  --  85  --   --  85   < >  --      --  328  --   --  283   < >  --      --  139  --   --  136   < >  --    POTASSIUM 4.0 3.5 3.4  --   --  3.4   < >  --    CHLORIDE 104  --  103  --   --  103   < >  --    CO2 30  --  30  --   --  28   < >  --    BUN 24  --  24  --   --  28   < >  --    CR 0.98  --  1.03  --   --  1.07*   < >  --    ANIONGAP 3  --  6  --   --  5   < >  --    JUNI 8.9  --  8.8  --   --  8.7   < >  --    *  --  102* 100*  --  115*   < >  --    ALBUMIN  --   --   --   --   --   --   --  2.0*   PROTTOTAL  --   --   --   --   --   --   --  5.8*   BILITOTAL  --   --   --   --   --   --   --  0.2   ALKPHOS  --   --   --   --   --   --   --  156*   ALT  --   --   --   --   --   --   --  29   AST  --   --   --   --   --   --   --  63*    < > = values in this interval not displayed.     No results found for this or any previous visit (from the past 24 hour(s)).  Medications     - MEDICATION INSTRUCTIONS -       - MEDICATION INSTRUCTIONS -         [Held by provider] aspirin  81 mg Oral QPM     buPROPion  150 mg Oral QAM     citalopram  40 mg Oral QAM     cyanocobalamin  1,000 mcg Oral Daily     diosmin-hesperidin 450-50  1 capsule Oral BID     [Held by provider] enoxaparin ANTICOAGULANT  40 mg Subcutaneous Q24H     furosemide  10 mg Oral BID     gabapentin  300 mg Oral TID     ipratropium - albuterol 0.5 mg/2.5 mg/3 mL  3 mL Nebulization Q4H While awake     Aleksandr  1 packet Oral BID     multivitamin  w/minerals  1 tablet Oral Daily     pantoprazole  40 mg Oral Daily     piperacillin-tazobactam  3.375 g Intravenous Q6H     potassium chloride ER  20 mEq Oral QPM     potassium chloride ER  40 mEq Oral QAM     pregabalin  100 mg Oral BID     rOPINIRole  0.5 mg Oral BID     rOPINIRole  1 mg Oral At Bedtime     simvastatin  40 mg Oral At Bedtime     sodium chloride (PF)  3 mL Intracatheter Q8H     spironolactone  25 mg Oral Daily     vitamin B complex with vitamin C  1 tablet Oral Daily     Vitamin D3  250 mcg Oral Daily

## 2022-03-27 NOTE — PROGRESS NOTES
[id number]: Alert and oriented x4. Vital signs stable on RA. Assist of 2 GB/W. Tolerating regular diet. Lung sounds dim. + flatus, BM +. Adequate urine output in garrison. BLE edema, pt declined wound care, stated she typically dose the first change around breakfast. Serosanguinous drainage to RLE. Pain managed with rest, declined PRN medications. Denies nausea. Tele SR. Wore BIPAP while sleeping for a few hours, requested to take it off at 0330. Hgb 6.7 in AM, conditional order to transfuse MD madi paged, day shift RN updated. New PIV to L forearm SL.

## 2022-03-27 NOTE — PROVIDER NOTIFICATION
MD Notification    Notified Person: MD    Notified Person Name: Yessica Meehan    Notification Date/Time: 3/27/22 0707    Notification Interaction: text page    Purpose of Notification:Pt Hgb dropped to 6.7. Will release 1 conditional order to transfuse 1 unit RBC.    Orders Received:    Comments:

## 2022-03-28 ENCOUNTER — DOCUMENTATION ONLY (OUTPATIENT)
Dept: OTHER | Facility: CLINIC | Age: 77
End: 2022-03-28
Payer: COMMERCIAL

## 2022-03-28 ENCOUNTER — ANESTHESIA (OUTPATIENT)
Dept: SURGERY | Facility: CLINIC | Age: 77
DRG: 264 | End: 2022-03-28
Payer: COMMERCIAL

## 2022-03-28 ENCOUNTER — APPOINTMENT (OUTPATIENT)
Dept: SURGERY | Facility: PHYSICIAN GROUP | Age: 77
End: 2022-03-28
Payer: COMMERCIAL

## 2022-03-28 ENCOUNTER — ANESTHESIA EVENT (OUTPATIENT)
Dept: SURGERY | Facility: CLINIC | Age: 77
DRG: 264 | End: 2022-03-28
Payer: COMMERCIAL

## 2022-03-28 ENCOUNTER — APPOINTMENT (OUTPATIENT)
Dept: GENERAL RADIOLOGY | Facility: CLINIC | Age: 77
DRG: 264 | End: 2022-03-28
Attending: SURGERY
Payer: COMMERCIAL

## 2022-03-28 LAB
CREAT SERPL-MCNC: 1.11 MG/DL (ref 0.52–1.04)
GFR SERPL CREATININE-BSD FRML MDRD: 52 ML/MIN/1.73M2
HGB BLD-MCNC: 7.6 G/DL (ref 11.7–15.7)
POTASSIUM BLD-SCNC: 3.7 MMOL/L (ref 3.4–5.3)
SARS-COV-2 RNA RESP QL NAA+PROBE: NEGATIVE

## 2022-03-28 PROCEDURE — 0JBN0ZZ EXCISION OF RIGHT LOWER LEG SUBCUTANEOUS TISSUE AND FASCIA, OPEN APPROACH: ICD-10-PCS | Performed by: SURGERY

## 2022-03-28 PROCEDURE — 999N000157 HC STATISTIC RCP TIME EA 10 MIN

## 2022-03-28 PROCEDURE — 250N000013 HC RX MED GY IP 250 OP 250 PS 637: Performed by: PHYSICIAN ASSISTANT

## 2022-03-28 PROCEDURE — 272N000001 HC OR GENERAL SUPPLY STERILE: Performed by: SURGERY

## 2022-03-28 PROCEDURE — 258N000003 HC RX IP 258 OP 636: Performed by: ANESTHESIOLOGY

## 2022-03-28 PROCEDURE — 71046 X-RAY EXAM CHEST 2 VIEWS: CPT

## 2022-03-28 PROCEDURE — 360N000075 HC SURGERY LEVEL 2, PER MIN: Performed by: SURGERY

## 2022-03-28 PROCEDURE — 94640 AIRWAY INHALATION TREATMENT: CPT | Mod: 76

## 2022-03-28 PROCEDURE — 370N000017 HC ANESTHESIA TECHNICAL FEE, PER MIN: Performed by: SURGERY

## 2022-03-28 PROCEDURE — 250N000025 HC SEVOFLURANE, PER MIN: Performed by: SURGERY

## 2022-03-28 PROCEDURE — 15002 WOUND PREP TRK/ARM/LEG: CPT | Performed by: SURGERY

## 2022-03-28 PROCEDURE — 94660 CPAP INITIATION&MGMT: CPT

## 2022-03-28 PROCEDURE — 94640 AIRWAY INHALATION TREATMENT: CPT

## 2022-03-28 PROCEDURE — 250N000009 HC RX 250: Performed by: PHYSICIAN ASSISTANT

## 2022-03-28 PROCEDURE — 250N000013 HC RX MED GY IP 250 OP 250 PS 637: Performed by: INTERNAL MEDICINE

## 2022-03-28 PROCEDURE — 15003 WOUND PREP ADDL 100 CM: CPT | Performed by: SURGERY

## 2022-03-28 PROCEDURE — 120N000001 HC R&B MED SURG/OB

## 2022-03-28 PROCEDURE — 84132 ASSAY OF SERUM POTASSIUM: CPT | Performed by: HOSPITALIST

## 2022-03-28 PROCEDURE — 999N000141 HC STATISTIC PRE-PROCEDURE NURSING ASSESSMENT: Performed by: SURGERY

## 2022-03-28 PROCEDURE — 710N000010 HC RECOVERY PHASE 1, LEVEL 2, PER MIN: Performed by: SURGERY

## 2022-03-28 PROCEDURE — 250N000009 HC RX 250: Performed by: SURGERY

## 2022-03-28 PROCEDURE — 0JUN0JZ SUPPLEMENT OF RIGHT LOWER LEG SUBCUTANEOUS TISSUE AND FASCIA WITH SYNTHETIC SUBSTITUTE, OPEN APPROACH: ICD-10-PCS | Performed by: SURGERY

## 2022-03-28 PROCEDURE — 36415 COLL VENOUS BLD VENIPUNCTURE: CPT | Performed by: ANESTHESIOLOGY

## 2022-03-28 PROCEDURE — 99232 SBSQ HOSP IP/OBS MODERATE 35: CPT | Performed by: HOSPITALIST

## 2022-03-28 PROCEDURE — 250N000011 HC RX IP 250 OP 636: Performed by: ANESTHESIOLOGY

## 2022-03-28 PROCEDURE — 15273 SKIN SUB GRFT T/ARM/LG CHILD: CPT | Mod: RT | Performed by: SURGERY

## 2022-03-28 PROCEDURE — U0003 INFECTIOUS AGENT DETECTION BY NUCLEIC ACID (DNA OR RNA); SEVERE ACUTE RESPIRATORY SYNDROME CORONAVIRUS 2 (SARS-COV-2) (CORONAVIRUS DISEASE [COVID-19]), AMPLIFIED PROBE TECHNIQUE, MAKING USE OF HIGH THROUGHPUT TECHNOLOGIES AS DESCRIBED BY CMS-2020-01-R: HCPCS | Performed by: SURGERY

## 2022-03-28 PROCEDURE — 250N000011 HC RX IP 250 OP 636: Performed by: PHYSICIAN ASSISTANT

## 2022-03-28 PROCEDURE — 258N000003 HC RX IP 258 OP 636

## 2022-03-28 PROCEDURE — 250N000009 HC RX 250

## 2022-03-28 PROCEDURE — 85018 HEMOGLOBIN: CPT | Performed by: ANESTHESIOLOGY

## 2022-03-28 PROCEDURE — 250N000013 HC RX MED GY IP 250 OP 250 PS 637: Performed by: SURGERY

## 2022-03-28 PROCEDURE — 999N000128 HC STATISTIC PERIPHERAL IV START W/O US GUIDANCE

## 2022-03-28 PROCEDURE — 250N000011 HC RX IP 250 OP 636

## 2022-03-28 PROCEDURE — 82565 ASSAY OF CREATININE: CPT | Performed by: SURGERY

## 2022-03-28 PROCEDURE — 15274 SKN SUB GRFT T/A/L CHILD ADD: CPT | Mod: RT | Performed by: SURGERY

## 2022-03-28 DEVICE — IMP GRAFT MYRIAD THICK AROA 10X20CM CHG PER SQ CM= 200 UNITS: Type: IMPLANTABLE DEVICE | Site: LEG | Status: FUNCTIONAL

## 2022-03-28 RX ORDER — SODIUM CHLORIDE, SODIUM LACTATE, POTASSIUM CHLORIDE, CALCIUM CHLORIDE 600; 310; 30; 20 MG/100ML; MG/100ML; MG/100ML; MG/100ML
INJECTION, SOLUTION INTRAVENOUS CONTINUOUS
Status: DISCONTINUED | OUTPATIENT
Start: 2022-03-28 | End: 2022-03-28 | Stop reason: HOSPADM

## 2022-03-28 RX ORDER — HYDROMORPHONE HYDROCHLORIDE 2 MG/1
2 TABLET ORAL EVERY 4 HOURS PRN
Status: DISCONTINUED | OUTPATIENT
Start: 2022-03-28 | End: 2022-04-29 | Stop reason: HOSPADM

## 2022-03-28 RX ORDER — HYDROMORPHONE HCL IN WATER/PF 6 MG/30 ML
0.4 PATIENT CONTROLLED ANALGESIA SYRINGE INTRAVENOUS EVERY 5 MIN PRN
Status: DISCONTINUED | OUTPATIENT
Start: 2022-03-28 | End: 2022-03-28 | Stop reason: HOSPADM

## 2022-03-28 RX ORDER — ONDANSETRON 4 MG/1
4 TABLET, ORALLY DISINTEGRATING ORAL EVERY 6 HOURS PRN
Status: DISCONTINUED | OUTPATIENT
Start: 2022-03-28 | End: 2022-04-29 | Stop reason: HOSPADM

## 2022-03-28 RX ORDER — OXYCODONE HYDROCHLORIDE 5 MG/1
10 TABLET ORAL EVERY 4 HOURS PRN
Status: DISCONTINUED | OUTPATIENT
Start: 2022-03-28 | End: 2022-03-28 | Stop reason: HOSPADM

## 2022-03-28 RX ORDER — POLYETHYLENE GLYCOL 3350 17 G/17G
17 POWDER, FOR SOLUTION ORAL DAILY
Status: DISCONTINUED | OUTPATIENT
Start: 2022-03-29 | End: 2022-04-29 | Stop reason: HOSPADM

## 2022-03-28 RX ORDER — PROPOFOL 10 MG/ML
INJECTION, EMULSION INTRAVENOUS PRN
Status: DISCONTINUED | OUTPATIENT
Start: 2022-03-28 | End: 2022-03-28

## 2022-03-28 RX ORDER — DEXAMETHASONE SODIUM PHOSPHATE 4 MG/ML
INJECTION, SOLUTION INTRA-ARTICULAR; INTRALESIONAL; INTRAMUSCULAR; INTRAVENOUS; SOFT TISSUE PRN
Status: DISCONTINUED | OUTPATIENT
Start: 2022-03-28 | End: 2022-03-28

## 2022-03-28 RX ORDER — AMOXICILLIN 250 MG
1 CAPSULE ORAL 2 TIMES DAILY
Status: DISCONTINUED | OUTPATIENT
Start: 2022-03-28 | End: 2022-04-29 | Stop reason: HOSPADM

## 2022-03-28 RX ORDER — ONDANSETRON 2 MG/ML
INJECTION INTRAMUSCULAR; INTRAVENOUS PRN
Status: DISCONTINUED | OUTPATIENT
Start: 2022-03-28 | End: 2022-03-28

## 2022-03-28 RX ORDER — LIDOCAINE HYDROCHLORIDE 20 MG/ML
INJECTION, SOLUTION INFILTRATION; PERINEURAL PRN
Status: DISCONTINUED | OUTPATIENT
Start: 2022-03-28 | End: 2022-03-28

## 2022-03-28 RX ORDER — ONDANSETRON 2 MG/ML
4 INJECTION INTRAMUSCULAR; INTRAVENOUS EVERY 30 MIN PRN
Status: DISCONTINUED | OUTPATIENT
Start: 2022-03-28 | End: 2022-03-28 | Stop reason: HOSPADM

## 2022-03-28 RX ORDER — ONDANSETRON 2 MG/ML
4 INJECTION INTRAMUSCULAR; INTRAVENOUS EVERY 6 HOURS PRN
Status: DISCONTINUED | OUTPATIENT
Start: 2022-03-28 | End: 2022-04-29 | Stop reason: HOSPADM

## 2022-03-28 RX ORDER — PROCHLORPERAZINE MALEATE 5 MG
5 TABLET ORAL EVERY 6 HOURS PRN
Status: DISCONTINUED | OUTPATIENT
Start: 2022-03-28 | End: 2022-04-29 | Stop reason: HOSPADM

## 2022-03-28 RX ORDER — LABETALOL HYDROCHLORIDE 5 MG/ML
10 INJECTION, SOLUTION INTRAVENOUS
Status: DISCONTINUED | OUTPATIENT
Start: 2022-03-28 | End: 2022-03-28 | Stop reason: HOSPADM

## 2022-03-28 RX ORDER — FENTANYL CITRATE 0.05 MG/ML
50 INJECTION, SOLUTION INTRAMUSCULAR; INTRAVENOUS EVERY 5 MIN PRN
Status: DISCONTINUED | OUTPATIENT
Start: 2022-03-28 | End: 2022-03-28 | Stop reason: HOSPADM

## 2022-03-28 RX ORDER — ONDANSETRON 4 MG/1
4 TABLET, ORALLY DISINTEGRATING ORAL EVERY 30 MIN PRN
Status: DISCONTINUED | OUTPATIENT
Start: 2022-03-28 | End: 2022-03-28 | Stop reason: HOSPADM

## 2022-03-28 RX ORDER — CEFAZOLIN SODIUM 2 G/100ML
2 INJECTION, SOLUTION INTRAVENOUS EVERY 8 HOURS
Status: DISCONTINUED | OUTPATIENT
Start: 2022-03-28 | End: 2022-03-28

## 2022-03-28 RX ORDER — FENTANYL CITRATE 50 UG/ML
INJECTION, SOLUTION INTRAMUSCULAR; INTRAVENOUS PRN
Status: DISCONTINUED | OUTPATIENT
Start: 2022-03-28 | End: 2022-03-28

## 2022-03-28 RX ORDER — ACETAMINOPHEN 325 MG/1
650 TABLET ORAL EVERY 4 HOURS PRN
Status: DISCONTINUED | OUTPATIENT
Start: 2022-03-31 | End: 2022-03-28

## 2022-03-28 RX ORDER — HYDROMORPHONE HCL IN WATER/PF 6 MG/30 ML
0.4 PATIENT CONTROLLED ANALGESIA SYRINGE INTRAVENOUS
Status: DISCONTINUED | OUTPATIENT
Start: 2022-03-28 | End: 2022-04-29 | Stop reason: HOSPADM

## 2022-03-28 RX ORDER — LIDOCAINE 40 MG/G
CREAM TOPICAL
Status: DISCONTINUED | OUTPATIENT
Start: 2022-03-28 | End: 2022-04-29 | Stop reason: HOSPADM

## 2022-03-28 RX ORDER — BISACODYL 10 MG
10 SUPPOSITORY, RECTAL RECTAL DAILY PRN
Status: DISCONTINUED | OUTPATIENT
Start: 2022-03-28 | End: 2022-04-29 | Stop reason: HOSPADM

## 2022-03-28 RX ORDER — PROPOFOL 10 MG/ML
INJECTION, EMULSION INTRAVENOUS CONTINUOUS PRN
Status: DISCONTINUED | OUTPATIENT
Start: 2022-03-28 | End: 2022-03-28

## 2022-03-28 RX ORDER — ACETAMINOPHEN 325 MG/1
975 TABLET ORAL EVERY 8 HOURS
Status: COMPLETED | OUTPATIENT
Start: 2022-03-28 | End: 2022-03-31

## 2022-03-28 RX ORDER — HYDROMORPHONE HYDROCHLORIDE 2 MG/1
4 TABLET ORAL EVERY 4 HOURS PRN
Status: DISCONTINUED | OUTPATIENT
Start: 2022-03-28 | End: 2022-04-29 | Stop reason: HOSPADM

## 2022-03-28 RX ORDER — MAGNESIUM HYDROXIDE 1200 MG/15ML
LIQUID ORAL PRN
Status: DISCONTINUED | OUTPATIENT
Start: 2022-03-28 | End: 2022-03-28 | Stop reason: HOSPADM

## 2022-03-28 RX ORDER — HYDROMORPHONE HCL IN WATER/PF 6 MG/30 ML
0.2 PATIENT CONTROLLED ANALGESIA SYRINGE INTRAVENOUS
Status: DISCONTINUED | OUTPATIENT
Start: 2022-03-28 | End: 2022-04-29 | Stop reason: HOSPADM

## 2022-03-28 RX ADMIN — PROPOFOL 30 MCG/KG/MIN: 10 INJECTION, EMULSION INTRAVENOUS at 10:14

## 2022-03-28 RX ADMIN — SUGAMMADEX 250 MG: 100 INJECTION, SOLUTION INTRAVENOUS at 10:47

## 2022-03-28 RX ADMIN — Medication 1 CAPSULE: at 07:04

## 2022-03-28 RX ADMIN — Medication 1 CAPSULE: at 22:43

## 2022-03-28 RX ADMIN — PREGABALIN 100 MG: 100 CAPSULE ORAL at 21:08

## 2022-03-28 RX ADMIN — PIPERACILLIN SODIUM AND TAZOBACTAM SODIUM 3.38 G: 3; .375 INJECTION, POWDER, LYOPHILIZED, FOR SOLUTION INTRAVENOUS at 07:05

## 2022-03-28 RX ADMIN — ACETAMINOPHEN 975 MG: 325 TABLET ORAL at 14:22

## 2022-03-28 RX ADMIN — IPRATROPIUM BROMIDE AND ALBUTEROL SULFATE 3 ML: .5; 3 SOLUTION RESPIRATORY (INHALATION) at 00:03

## 2022-03-28 RX ADMIN — GABAPENTIN 300 MG: 300 CAPSULE ORAL at 07:04

## 2022-03-28 RX ADMIN — PIPERACILLIN SODIUM AND TAZOBACTAM SODIUM 3.38 G: 3; .375 INJECTION, POWDER, LYOPHILIZED, FOR SOLUTION INTRAVENOUS at 14:23

## 2022-03-28 RX ADMIN — LIDOCAINE HYDROCHLORIDE 80 MG: 20 INJECTION, SOLUTION INFILTRATION; PERINEURAL at 10:08

## 2022-03-28 RX ADMIN — PHENYLEPHRINE HYDROCHLORIDE 100 MCG: 10 INJECTION INTRAVENOUS at 10:16

## 2022-03-28 RX ADMIN — POTASSIUM CHLORIDE 40 MEQ: 1500 TABLET, EXTENDED RELEASE ORAL at 07:04

## 2022-03-28 RX ADMIN — ONDANSETRON 4 MG: 2 INJECTION INTRAMUSCULAR; INTRAVENOUS at 10:23

## 2022-03-28 RX ADMIN — SPIRONOLACTONE 25 MG: 25 TABLET ORAL at 07:04

## 2022-03-28 RX ADMIN — HYDROMORPHONE HYDROCHLORIDE 0.4 MG: 0.2 INJECTION, SOLUTION INTRAMUSCULAR; INTRAVENOUS; SUBCUTANEOUS at 11:36

## 2022-03-28 RX ADMIN — SODIUM CHLORIDE, POTASSIUM CHLORIDE, SODIUM LACTATE AND CALCIUM CHLORIDE: 600; 310; 30; 20 INJECTION, SOLUTION INTRAVENOUS at 09:13

## 2022-03-28 RX ADMIN — SIMVASTATIN 40 MG: 40 TABLET, FILM COATED ORAL at 22:42

## 2022-03-28 RX ADMIN — ROPINIROLE HYDROCHLORIDE 0.5 MG: 0.5 TABLET, FILM COATED ORAL at 07:04

## 2022-03-28 RX ADMIN — ROPINIROLE HYDROCHLORIDE 1 MG: 0.5 TABLET, FILM COATED ORAL at 22:41

## 2022-03-28 RX ADMIN — GABAPENTIN 300 MG: 300 CAPSULE ORAL at 22:42

## 2022-03-28 RX ADMIN — PREGABALIN 100 MG: 100 CAPSULE ORAL at 07:04

## 2022-03-28 RX ADMIN — DEXAMETHASONE SODIUM PHOSPHATE 4 MG: 4 INJECTION, SOLUTION INTRA-ARTICULAR; INTRALESIONAL; INTRAMUSCULAR; INTRAVENOUS; SOFT TISSUE at 10:15

## 2022-03-28 RX ADMIN — GABAPENTIN 300 MG: 300 CAPSULE ORAL at 15:52

## 2022-03-28 RX ADMIN — BUPROPION HYDROCHLORIDE 150 MG: 150 TABLET, EXTENDED RELEASE ORAL at 07:03

## 2022-03-28 RX ADMIN — FUROSEMIDE 10 MG: 20 TABLET ORAL at 15:52

## 2022-03-28 RX ADMIN — PIPERACILLIN SODIUM AND TAZOBACTAM SODIUM 3.38 G: 3; .375 INJECTION, POWDER, LYOPHILIZED, FOR SOLUTION INTRAVENOUS at 01:29

## 2022-03-28 RX ADMIN — FENTANYL CITRATE 100 MCG: 50 INJECTION, SOLUTION INTRAMUSCULAR; INTRAVENOUS at 10:08

## 2022-03-28 RX ADMIN — ROCURONIUM BROMIDE 50 MG: 50 INJECTION, SOLUTION INTRAVENOUS at 10:09

## 2022-03-28 RX ADMIN — PANTOPRAZOLE SODIUM 40 MG: 40 TABLET, DELAYED RELEASE ORAL at 07:04

## 2022-03-28 RX ADMIN — IPRATROPIUM BROMIDE AND ALBUTEROL SULFATE 3 ML: .5; 3 SOLUTION RESPIRATORY (INHALATION) at 19:56

## 2022-03-28 RX ADMIN — SENNOSIDES AND DOCUSATE SODIUM 1 TABLET: 50; 8.6 TABLET ORAL at 21:08

## 2022-03-28 RX ADMIN — SENNOSIDES AND DOCUSATE SODIUM 1 TABLET: 50; 8.6 TABLET ORAL at 14:23

## 2022-03-28 RX ADMIN — IPRATROPIUM BROMIDE AND ALBUTEROL SULFATE 3 ML: .5; 3 SOLUTION RESPIRATORY (INHALATION) at 17:03

## 2022-03-28 RX ADMIN — FUROSEMIDE 10 MG: 20 TABLET ORAL at 07:04

## 2022-03-28 RX ADMIN — POTASSIUM CHLORIDE 20 MEQ: 1500 TABLET, EXTENDED RELEASE ORAL at 21:08

## 2022-03-28 RX ADMIN — CITALOPRAM HYDROBROMIDE 40 MG: 20 TABLET, FILM COATED ORAL at 07:04

## 2022-03-28 RX ADMIN — ROPINIROLE HYDROCHLORIDE 0.5 MG: 0.5 TABLET, FILM COATED ORAL at 15:52

## 2022-03-28 RX ADMIN — PIPERACILLIN SODIUM AND TAZOBACTAM SODIUM 3.38 G: 3; .375 INJECTION, POWDER, LYOPHILIZED, FOR SOLUTION INTRAVENOUS at 18:58

## 2022-03-28 RX ADMIN — CYANOCOBALAMIN TAB 1000 MCG 1000 MCG: 1000 TAB at 07:04

## 2022-03-28 RX ADMIN — PROPOFOL 140 MG: 10 INJECTION, EMULSION INTRAVENOUS at 10:08

## 2022-03-28 RX ADMIN — ACETAMINOPHEN 975 MG: 325 TABLET ORAL at 22:42

## 2022-03-28 RX ADMIN — FENTANYL CITRATE 50 MCG: 50 INJECTION, SOLUTION INTRAMUSCULAR; INTRAVENOUS at 11:05

## 2022-03-28 RX ADMIN — IPRATROPIUM BROMIDE AND ALBUTEROL SULFATE 3 ML: .5; 3 SOLUTION RESPIRATORY (INHALATION) at 07:47

## 2022-03-28 ASSESSMENT — LIFESTYLE VARIABLES: TOBACCO_USE: 0

## 2022-03-28 ASSESSMENT — ACTIVITIES OF DAILY LIVING (ADL)
ADLS_ACUITY_SCORE: 12
ADLS_ACUITY_SCORE: 8
ADLS_ACUITY_SCORE: 10
ADLS_ACUITY_SCORE: 11
ADLS_ACUITY_SCORE: 12
ADLS_ACUITY_SCORE: 10
ADLS_ACUITY_SCORE: 11
ADLS_ACUITY_SCORE: 10
ADLS_ACUITY_SCORE: 12
ADLS_ACUITY_SCORE: 10
ADLS_ACUITY_SCORE: 8
ADLS_ACUITY_SCORE: 8
ADLS_ACUITY_SCORE: 11
ADLS_ACUITY_SCORE: 10
ADLS_ACUITY_SCORE: 11
ADLS_ACUITY_SCORE: 12
ADLS_ACUITY_SCORE: 11

## 2022-03-28 ASSESSMENT — COPD QUESTIONNAIRES: COPD: 0

## 2022-03-28 ASSESSMENT — ENCOUNTER SYMPTOMS: SEIZURES: 0

## 2022-03-28 NOTE — ANESTHESIA POSTPROCEDURE EVALUATION
Patient: Elizabeth Kelley    Procedure: Procedure(s):  DEBRIDEMENT AND WOUND DRESSING CHANGE AND MYRIAD APPLICATION OF RIGHT LOWER LEG WOUND       Anesthesia Type:  General    Note:  Disposition: Inpatient   Postop Pain Control: Uneventful            Sign Out: Well controlled pain   PONV: No   Neuro/Psych: Uneventful            Sign Out: Acceptable/Baseline neuro status   Airway/Respiratory: Uneventful            Sign Out: Acceptable/Baseline resp. status   CV/Hemodynamics: Uneventful            Sign Out: Acceptable CV status; No obvious hypovolemia; No obvious fluid overload   Other NRE: NONE   DID A NON-ROUTINE EVENT OCCUR? No           Last vitals:  Vitals Value Taken Time   /60 03/28/22 1230   Temp 37.1  C (98.8  F) 03/28/22 1104   Pulse 77 03/28/22 1235   Resp 14 03/28/22 1235   SpO2 96 % 03/28/22 1235   Vitals shown include unvalidated device data.    Electronically Signed By: Sreekanth Morales DO  March 28, 2022  2:52 PM

## 2022-03-28 NOTE — PROGRESS NOTES
Alert and oriented x4. Vital signs stable on RA when awake, Bipap 30% while sleeping. On bipap from 3543-3067. Assist of 2 GB/W. Tolerating NPO diet. Lung sounds dim. + flatus, BM -. Adequate urine output in garrison. BLE edema, dressing changed x1 this shift, tolerated well. Serosanguinous drainage to RLE. Pain managed with rest and PO dilaudid. Denies nausea. Tele SR. Plan for surgery in AM.

## 2022-03-28 NOTE — OP NOTE
Procedure Date: March 28, 2022      SURGEON: Gabriele Bullock MD     ASSISTANT:  None.     PREOPERATIVE DIAGNOSIS:   1.    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    Same     PROCEDURES PERFORMED:  Intraoperative wound examination, wound debridement, application of myriad graft, 2 layer wrap    ANESTHESIA:  General     ESTIMATED BLOOD LOSS:  Less than 20 cc          DESCRIPTION OF PROCEDURE: Informed consent was obtained.  Patient was appropriately marked.  Patient was then taken to the operating room and placed in the supine position.  All pressure points were well-padded. The patient's right leg, calf, thigh were then prepped and draped in a sterile fashion.  A timeout was performed.  Remaining Acticoat was removed from the wound, wound was examined, wound was then excisionally debrided sharply, proximally 500 cm  dimension, approximately 50% granulation tissue, though not adequate for performance of split-thickness skin grafting.  Wound was copiously washed with hypochlorous acid Vashe, Myriad graft was then applied to the right lower extremity ulceration, covered with Hydrofera Blue, 2 layer wrap Urgo K2, red strip EdemaWear glycoside.    The patient tolerated the procedure and anesthesia well and was brought back to the recovery room, vital signs stable and vascular status intact to recovery room    Anticipate split-thickness skin grafting within 4 weeks when adequate granulation tissue development.      Gabriele Bullock MD  Pager [phone]

## 2022-03-28 NOTE — INTERVAL H&P NOTE
I have reviewed the surgical (or preoperative) H&P that is linked to this encounter, and examined the patient. There are no significant changes    Clinical Conditions Present on Arrival:  SECTIONPRESENTONADMISSIONBEGIN@

## 2022-03-28 NOTE — ANESTHESIA PREPROCEDURE EVALUATION
Anesthesia Pre-Procedure Evaluation    Patient: Elizabeth Kelley   MRN: [id number] : 1945        Procedure : Procedure(s):  SPLIT-THICKNESS SKIN GRAFT FROM RIGHT THIGH TO RIGHT LEG  APPLY WOUND VAC          Past Medical History:   Diagnosis Date     Ankle contracture, left      Class 3 severe obesity due to excess calories without serious comorbidity with body mass index (BMI) of 45.0 to 49.9 in adult (H)      Combined gastric and duodenal ulcer      Controlled substance agreement broken      Depression      Dyslipidemia      Edema      Edema      Gait abnormality      GERD (gastroesophageal reflux disease)      Gout      Lymphedema of both lower extremities      Melanoma (H)     left upper arm     MS (multiple sclerosis) (H)      RLS (restless legs syndrome)      Skin ulcer of left lower leg (H)      Valgus deformity of both feet      Vitamin D deficiency       Past Surgical History:   Procedure Laterality Date     ABLATION SAPHENOUS VEIN W/ RFA Right 2021    Saphenous vein, anterior accessory saphenous vein, sclerotherapy of multiple veins.     COSMETIC SURGERY      reduction of excess skin from weight loss     ESOPHAGOSCOPY, GASTROSCOPY, DUODENOSCOPY (EGD), COMBINED N/A 2015    Procedure: UPPER ENDOSCOPY with gastric biopsy;  Surgeon: Checo Fletcher MD;  Location: [facility];  Service:      IRRIGATION AND DEBRIDEMENT LOWER EXTREMITY, COMBINED Right 3/21/2022    Procedure: RIGHT LEG WOUND DEBRIDEMENT, PAULIE DERMATONE, MISONIX, VAC VERA FLOW WITH VASHE;  Surgeon: Gabriele Bullock MD;  Location: [facility]     MAMMOPLASTY REDUCTION Bilateral      MOHS MICROGRAPHIC PROCEDURE      left upper arm, melanoma     PICC SINGLE LUMEN PLACEMENT  2021          PICC SINGLE LUMEN PLACEMENT  2021          SPINE SURGERY      L5 area surgery, decompression      Allergies   Allergen Reactions     Other Environmental Allergy Anaphylaxis     Bees/Wasps Stings     Adhesive Tape Other (See Comments)      Red,itchy and oozes     Adhesive [Mecrylate] Unknown     Other reaction(s): sores     Percocet [Oxycodone-Acetaminophen] Rash     Vicodin [Hydrocodone-Acetaminophen] Nausea and Vomiting and Hives      Social History     Tobacco Use     Smoking status: Former Smoker     Packs/day: 0.25     Years: 12.00     Pack years: 3.00     Types: Cigarettes     Quit date: 1975     Years since quittin.3     Smokeless tobacco: Never Used     Tobacco comment: quit more than 30 years ago   Substance Use Topics     Alcohol use: Yes     Alcohol/week: 1.0 standard drink      Wt Readings from Last 1 Encounters:   22 125.8 kg (277 lb 5.4 oz)        Anesthesia Evaluation   Pt has had prior anesthetic. Type: General.    No history of anesthetic complications       ROS/MED HX  ENT/Pulmonary:     (+) recent URI, unresolved, ongoing tx:  (-) tobacco use, asthma, COPD and sleep apnea   Neurologic:     (+) Multiple Sclerosis, limitations: legs are sluggish, range of motion, seasonal.  (-) no seizures, no CVA and migraines   Cardiovascular:     (+) hypertension----- (-) CAD   METS/Exercise Tolerance:     Hematologic:       Musculoskeletal:       GI/Hepatic:     (+) GERD, Asymptomatic on medication,  (-) liver disease   Renal/Genitourinary:    (-) renal disease   Endo:     (+) Obesity,  (-) Type II DM and thyroid disease   Psychiatric/Substance Use:       Infectious Disease:       Malignancy:       Other:            Physical Exam    Airway        Mallampati: II   TM distance: > 3 FB   Neck ROM: full   Mouth opening: > 3 cm    Respiratory Devices and Support         Dental  no notable dental history         Cardiovascular   cardiovascular exam normal          Pulmonary   pulmonary exam normal        breath sounds clear to auscultation           OUTSIDE LABS:  CBC:   Lab Results   Component Value Date    WBC 9.7 2022    WBC 8.2 2022    HGB 7.6 (L) 2022    HGB 7.5 (L) 2022    HCT 24.8 (L) 2022    HCT  23.0 (L) 03/27/2022     03/27/2022     03/26/2022     BMP:   Lab Results   Component Value Date     03/27/2022     03/26/2022    POTASSIUM 3.7 03/28/2022    POTASSIUM 4.0 03/27/2022    CHLORIDE 104 03/27/2022    CHLORIDE 103 03/26/2022    CO2 30 03/27/2022    CO2 30 03/26/2022    BUN 24 03/27/2022    BUN 24 03/26/2022    CR 1.11 (H) 03/28/2022    CR 0.98 03/27/2022     (H) 03/27/2022     (H) 03/26/2022     COAGS: No results found for: PTT, INR, FIBR  POC: No results found for: BGM, HCG, HCGS  HEPATIC:   Lab Results   Component Value Date    ALBUMIN 2.0 (L) 03/23/2022    PROTTOTAL 5.8 (L) 03/23/2022    ALT 29 03/23/2022    AST 63 (H) 03/23/2022    ALKPHOS 156 (H) 03/23/2022    BILITOTAL 0.2 03/23/2022     OTHER:   Lab Results   Component Value Date    LACT 1.2 03/26/2022    A1C 5.9 06/23/2016    JUNI 8.9 03/27/2022    CRP 31.7 (H) 10/20/2021    SED 55 (H) 10/20/2021       Anesthesia Plan    ASA Status:  3   NPO Status:  NPO Appropriate    Anesthesia Type: General.     - Airway: ETT   Induction: Intravenous.   Maintenance: Balanced.   Techniques and Equipment:     - Airway: Video-Laryngoscope         Consents    Anesthesia Plan(s) and associated risks, benefits, and realistic alternatives discussed. Questions answered and patient/representative(s) expressed understanding.    - Discussed:     - Discussed with:  Patient      - Extended Intubation/Ventilatory Support Discussed: No.      - Patient is DNR/DNI Status: No    Use of blood products discussed: Yes.     - Discussed with: Patient.     - Consented: consented to blood products            Reason for refusal: other.     Postoperative Care    Pain management: IV analgesics, Oral pain medications, Multi-modal analgesia.   PONV prophylaxis: Ondansetron (or other 5HT-3), Dexamethasone or Solumedrol, Background Propofol Infusion     Comments:                Sreekanth Morales DO

## 2022-03-28 NOTE — PLAN OF CARE
Pt alert and oriented x 4. 5\10 pain in right lower leg. Right lower leg dressing intact. Vital signs stable, pt on room air during the day, uses bipap at night. Tele-normal sinus rhythm. 0830 pre-op nurse came to get patient for planned surgery.

## 2022-03-28 NOTE — BRIEF OP NOTE
[facility]    Brief Operative Note    Pre-operative diagnosis: Ulcer of right lower extremity with fat layer exposed (H) [L97.912]  Post-operative diagnosis Same as pre-operative diagnosis    Procedure: Debridement right leg wound, myriad application, 2 layer wrap application (wound bed not adequately prepped for skin graft)  Surgeon: Surgeon(s) and Role:     * Gabriele Bullock MD - Primary  Anesthesia: General   Estimated Blood Loss: Less than 50 ml    Drains: None  Specimens: * No specimens in log *  Findings:   None.  Complications: None.  Implants:   Implant Name Type Inv. Item Serial No.  Lot No. LRB No. Used Action   IMP GRAFT MYRIAD THICK AROA [id number] CHG PER SQ CM= 200 UNITS - [id number] Bone/Tissue/Biologic IMP GRAFT MYRIAD THICK AROA [id number] CHG PER SQ CM= 200 UNITS  [employer] [id number] Right 1 Implanted

## 2022-03-28 NOTE — ANESTHESIA CARE TRANSFER NOTE
Patient: Elizabeth Kelley    Procedure: Procedure(s):  SPLIT-THICKNESS SKIN GRAFT FROM RIGHT THIGH TO RIGHT LEG       Diagnosis: Ulcer of right lower extremity with fat layer exposed (H) [L97.912]  Diagnosis Additional Information: No value filed.    Anesthesia Type:   General     Note:    Oropharynx: oropharynx clear of all foreign objects and spontaneously breathing  Level of Consciousness: awake  Oxygen Supplementation: face mask  Supplemental oxygen: 6.  Independent Airway: airway patency satisfactory and stable  Dentition: dentition unchanged  Vital Signs Stable: post-procedure vital signs reviewed and stable  Report to RN Given: handoff report given  Patient transferred to: PACU (main)  Comments: Pt in pain in PACU and given 50 mcg of fentanyl. Seemed to relieve pain. VSS and spont breathing. RN given report.   Handoff Report: Identifed the Patient, Identified the Reponsible Provider, Reviewed the pertinent medical history, Discussed the surgical course, Reviewed Intra-OP anesthesia mangement and issues during anesthesia, Set expectations for post-procedure period and Allowed opportunity for questions and acknowledgement of understanding      Vitals:  Vitals Value Taken Time   /75 03/28/22 1104   Temp 36. 8 C    Pulse 76 03/28/22 1111   Resp 14 03/28/22 1111   SpO2 98 % 03/28/22 1111   Vitals shown include unvalidated device data.    Electronically Signed By: LAXMI Mora CRNA  March 28, 2022  11:12 AM

## 2022-03-28 NOTE — PROGRESS NOTES
[facility]    Medicine Progress Note - Hospitalist Service    Date of Admission:  3/21/2022    Assessment & Plan          Elizabeth Kelley is a 76 year old female with PMHx MS, chronic BLE lymphedema with venous insufficiency and chronic R leg cellulitis, GERD, hyperlipidemia, and depression who underwent previously scheduled excisional debridement of RLE cicumferential venous hypertensive ulceration and application of wound vac on 3/21/22. Hospitalist service re-consulted 3/23/22 for decreased responsiveness, meeting sepsis criteria with suspected pneumonia as source.      Sepsis secondary to CAP vs aspiration pneumonia  Acute hypoxic respiratory failure secondary to above, improved  Hx of MRSA: Tmax 101.5, tachycardic in the 110s, hypoxic to 86% on room air. WBC 18.3, procal 0.17. Lactic 1.3. UA with trace LE, WBC 7. . VBG 7.33/54/57/28. CXR with patchy airspace opacities in the left mid and lower lung, suspicious for pneumonia.  Highest suspicion for pneumonia given the above, though note hx of recurrent RLE cellulitis with procedure as above. Hx of MRSA. RLE with wound vac in place.   Was volume resusciated and started on antibiotics including zosyn and vancomycin, and since narrowed to zosyn  Blood cultures- NGTD  Repeat CXR on 3/28 notable for new right upper lobe infiltrate and basilar infiltrate lateral views  Did receive some diuretic for the volume overload  plan  - RCAT consulted, encourage pulmonary toilet with IS and acapella   - Duonebs q4 hrs while awake, PRN albuterol nebs   - monitor fluid status  - resumed PTA Lasix and Aldactone  - continue zosyn for now for the pneumonia. If concern for wound infection, would affect duration, but at this point would stop after 5-7 days  - repeat CBC in the AM  - Robitussin prn  - was RA prior to surgery, wean as able after  - SLP consult- no evidence of dysphagia     Encephalopathy, multifactorial- resolved  Acute hypoxic and  hypercapnic respiratory failure- multifactorial  Altered mentation noted by nursing during hosptialization  Multifactorial in the setting of fever, narcotic pain medications, suspected underlying BOLIVAR, possible dehydration. Received dose of narcan, but did not significantly change pts mentation immediately, though seemed to improve within the hour after IVF bolus was near completion. Oriented X2 initially on my exam, drifting to sleep midway through conversation, but easily arousable. Mentation improved upon reevaluation within 30 minutes.   plan  - continue expectant management  - judicious pain med use  -bipap prn  - suspect underlying BOLIVAR, urged her to get evaluated as outpatient  - resumed PTA Wellbutrin, Lyrica and Requip as per the patient's request but will closely monitor mentation, especially if she will get pain medications; low threshold to hold these meds if she becomes confused/lethargic   - BiPAP prn     Acute blood loss anemia  Chronic normocytic anemia  Baseline 10-11. Down to 8.3 post-procedure. No active bleeding. Could be dilutional as checked while receiving IVF bolus   3/24- Hb down to 6.1 but repeated Hb 7.1, again down to 6.4  transfused 1 unit PRBCs  transfused another unit PRBC--3/27  Plan  - monitor cbc in the AM  - discontinued scheduled Celebrex  - transfuse for < 7     Chronic BLE lymphedema with venous insufficiency and chronic R leg cellulitis   S/p excisional debridement of RLE cicumferential venous hypertensive ulceration and application of wound vac (3/21/22)  S/p intraoperative wound examination, wound debridement, and application of myriad 2 layer graft on 3/28  Defer routine post-operative cares to Dr. Bullock   - judicious pain control  -  Gabapentin 300 mg po TID  - also has Toradol available q6h prn (monitor Hb)  - lyrical 100 bid  - defer wound cares to Dr. Bullock  - continues on lasix and aldactone  - also resumed PTA KCl supplementation 40 meq po in am and 20 mEq po every  "evening    Severe Obesity: Estimated body mass index is 50.81 kg/m  as calculated from the following:    Height as of this encounter: 1.549 m (5' 1\").    Weight as of this encounter: 122 kg (268 lb 14.4 oz).       MS: Ambulatory at baseline, though some weakness in lower extremities and intermittent upper extremity weakness.     Hyponatremia, mild, resolved  - Na 132 on 3/23--improved to 136--139  - BMP in am    GERD: Continue Protonix     Hyperlipidemia: Continue statin     Depression:PTA Celexa, Wellbutrin and Lyrica held few days ago given AMS, resumed on 3/26 as mentation back to baseline.    RLS:  PTA scheduled Requip during the daytime held when she had AMS; she asked to resume PTA Requip 0.5 mg po BID during the day and 1 mg po at bedtime.       Diet: Advance Diet as Tolerated: Regular Diet Adult    DVT Prophylaxis: As per primary  Amato Catheter: PRESENT, indication: Other (Comment);Strict 1-2 Hour I&O  Central Lines: None  Cardiac Monitoring: ACTIVE order. Indication: hypoxic hypercapneic resp failure  Code Status: Full Code      Disposition Plan   Expected Discharge: 04/04/2022     Anticipated discharge location: home with family    Delays:       The patient's care was discussed with the Bedside Nurse and Patient.    Nash Carranza, DO  Hospitalist Service  [facility]  Securely message with the Vocera Web Console (learn more here)  Text page via [employer] Paging/Directory     Clinically Significant Risk Factors Present on Admission                    ______________________________________________________________________    Interval History    Records reviewd. Evaluated after OR. Pleasant and conversant, but mildly confused likely from anesthesia. 97% on 3 liters, wean as able. No major respiratory complaints, but note that surgery did order a CXR which reveals pneumonia      Data reviewed today: I reviewed all medications, new labs and imaging results over the last 24 hours. I " personally reviewed no images or EKG's today.    Physical Exam   Vital Signs: Temp: 98.5  F (36.9  C) Temp src: Oral BP: 135/67 Pulse: 77   Resp: 15 SpO2: 94 % O2 Device: Nasal cannula Oxygen Delivery: 3 LPM  Weight: 277 lbs 5.42 oz    CONSTITUTIONAL: awake, alert, laying in bed, NAD  HEENT: Normocephalic, atraumatic.   CARDIOVASCULAR: S1S2, RRR, no murmurs  RESPIRATORY: diminished air entry at bases, mild bibasilar crackles, no wheezing, no wheezing, no rales.  GASTROINTESTINAL:  Abdomen soft, obese, non-distended. BS auscultated in all four quadrants. Negative for tenderness to palpation.  No masses or organomegaly noted.  MUSCULOSKELETAL: No gross deformities noted. Normal muscle tone.   EXTREMITIES: RLE covered with dressing, oozing some serosanguinous discharge, LLE- chronic indurated skin changes  NEUROLOGIC: Alert and oriented to person, place, and time.  strength intact. No focal neuro deficits.   PSYCH- normal mood       Data   Recent Labs   Lab 03/28/22  0553 03/27/22  1413 03/27/22  0551 03/26/22  1212 03/26/22  0736 03/25/22  1126 03/25/22  0838 03/25/22  0603 03/24/22  0534 03/23/22  1814   WBC  --   --  9.7  --  8.2  --   --  9.0   < >  --    HGB 7.6* 7.5* 6.7*  --  7.1*  --    < > 6.7*   < >  --    MCV  --   --  89  --  85  --   --  85   < >  --    PLT  --   --  344  --  328  --   --  283   < >  --    NA  --   --  137  --  139  --   --  136   < >  --    POTASSIUM 3.7  --  4.0 3.5 3.4  --   --  3.4   < >  --    CHLORIDE  --   --  104  --  103  --   --  103   < >  --    CO2  --   --  30  --  30  --   --  28   < >  --    BUN  --   --  24  --  24  --   --  28   < >  --    CR 1.11*  --  0.98  --  1.03  --   --  1.07*   < >  --    ANIONGAP  --   --  3  --  6  --   --  5   < >  --    JUNI  --   --  8.9  --  8.8  --   --  8.7   < >  --    GLC  --   --  110*  --  102* 100*  --  115*   < >  --    ALBUMIN  --   --   --   --   --   --   --   --   --  2.0*   PROTTOTAL  --   --   --   --   --   --   --   --   --   5.8*   BILITOTAL  --   --   --   --   --   --   --   --   --  0.2   ALKPHOS  --   --   --   --   --   --   --   --   --  156*   ALT  --   --   --   --   --   --   --   --   --  29   AST  --   --   --   --   --   --   --   --   --  63*    < > = values in this interval not displayed.     No results found for this or any previous visit (from the past 24 hour(s)).  Medications     - MEDICATION INSTRUCTIONS -       - MEDICATION INSTRUCTIONS -         acetaminophen  975 mg Oral Q8H     [Held by provider] aspirin  81 mg Oral QPM     buPROPion  150 mg Oral QAM     ceFAZolin  2 g Intravenous Q8H     citalopram  40 mg Oral QAM     cyanocobalamin  1,000 mcg Oral Daily     diosmin-hesperidin 450-50  1 capsule Oral BID     [Held by provider] enoxaparin ANTICOAGULANT  40 mg Subcutaneous Q24H     furosemide  10 mg Oral BID     gabapentin  300 mg Oral TID     ipratropium - albuterol 0.5 mg/2.5 mg/3 mL  3 mL Nebulization Q4H While awake     Aleksandr  1 packet Oral BID     multivitamin w/minerals  1 tablet Oral Daily     pantoprazole  40 mg Oral Daily     piperacillin-tazobactam  3.375 g Intravenous Q6H     [START ON 3/29/2022] polyethylene glycol  17 g Oral Daily     potassium chloride ER  20 mEq Oral QPM     potassium chloride ER  40 mEq Oral QAM     pregabalin  100 mg Oral BID     rOPINIRole  0.5 mg Oral BID     rOPINIRole  1 mg Oral At Bedtime     senna-docusate  1 tablet Oral BID     simvastatin  40 mg Oral At Bedtime     sodium chloride (PF)  3 mL Intracatheter Q8H     sodium chloride (PF)  3 mL Intracatheter Q8H     spironolactone  25 mg Oral Daily     vitamin B complex with vitamin C  1 tablet Oral Daily     Vitamin D3  250 mcg Oral Daily

## 2022-03-28 NOTE — ANESTHESIA PROCEDURE NOTES
Airway       Patient location during procedure: OR       Procedure Start/Stop Times: 3/28/2022 10:12 AM  Staff -        Anesthesiologist:  Sreekanth Morales DO       CRNA: Ron Nunez APRN CRNA       Performed By: CRNA  Consent for Airway        Urgency: elective  Indications and Patient Condition       Indications for airway management: devon-procedural       Induction type:intravenous       Mask difficulty assessment: 1 - vent by mask    Final Airway Details       Final airway type: endotracheal airway       Successful airway: ETT - single  Endotracheal Airway Details        ETT size (mm): 7.0       Cuffed: yes       Successful intubation technique: video laryngoscopy       VL Blade Size: Glidescope 3       Grade View of Cords: 1       Adjucts: stylet       Position: Right       Measured from: gums/teeth       Secured at (cm): 20       Bite block used: None    Post intubation assessment        Placement verified by: capnometry, equal breath sounds and chest rise        Number of attempts at approach: 1       Number of other approaches attempted: 0       Secured with: pink tape       Ease of procedure: easy       Dentition: Intact and Unchanged

## 2022-03-29 LAB
ANION GAP SERPL CALCULATED.3IONS-SCNC: 3 MMOL/L (ref 3–14)
BACTERIA BLD CULT: NO GROWTH
BUN SERPL-MCNC: 23 MG/DL (ref 7–30)
CALCIUM SERPL-MCNC: 9 MG/DL (ref 8.5–10.1)
CHLORIDE BLD-SCNC: 104 MMOL/L (ref 94–109)
CO2 SERPL-SCNC: 31 MMOL/L (ref 20–32)
CREAT SERPL-MCNC: 0.98 MG/DL (ref 0.52–1.04)
ERYTHROCYTE [DISTWIDTH] IN BLOOD BY AUTOMATED COUNT: 15.9 % (ref 10–15)
FERRITIN SERPL-MCNC: 69 NG/ML (ref 8–252)
FOLATE SERPL-MCNC: 26.6 NG/ML
GFR SERPL CREATININE-BSD FRML MDRD: 60 ML/MIN/1.73M2
GLUCOSE BLD-MCNC: 120 MG/DL (ref 70–99)
GLUCOSE BLDC GLUCOMTR-MCNC: 120 MG/DL (ref 70–99)
GLUCOSE BLDC GLUCOMTR-MCNC: 124 MG/DL (ref 70–99)
GLUCOSE BLDC GLUCOMTR-MCNC: 162 MG/DL (ref 70–99)
HCT VFR BLD AUTO: 24.4 % (ref 35–47)
HGB BLD-MCNC: 7.4 G/DL (ref 11.7–15.7)
IRON SATN MFR SERPL: 10 % (ref 15–46)
IRON SERPL-MCNC: 29 UG/DL (ref 35–180)
MCH RBC QN AUTO: 26.4 PG (ref 26.5–33)
MCHC RBC AUTO-ENTMCNC: 30.3 G/DL (ref 31.5–36.5)
MCV RBC AUTO: 87 FL (ref 78–100)
PLATELET # BLD AUTO: 372 10E3/UL (ref 150–450)
POTASSIUM BLD-SCNC: 4 MMOL/L (ref 3.4–5.3)
RBC # BLD AUTO: 2.8 10E6/UL (ref 3.8–5.2)
SODIUM SERPL-SCNC: 138 MMOL/L (ref 133–144)
TIBC SERPL-MCNC: 291 UG/DL (ref 240–430)
VIT B12 SERPL-MCNC: 731 PG/ML (ref 193–986)
WBC # BLD AUTO: 10.7 10E3/UL (ref 4–11)

## 2022-03-29 PROCEDURE — 94640 AIRWAY INHALATION TREATMENT: CPT | Mod: 76

## 2022-03-29 PROCEDURE — 85027 COMPLETE CBC AUTOMATED: CPT | Performed by: HOSPITALIST

## 2022-03-29 PROCEDURE — 120N000001 HC R&B MED SURG/OB

## 2022-03-29 PROCEDURE — 250N000013 HC RX MED GY IP 250 OP 250 PS 637: Performed by: PHYSICIAN ASSISTANT

## 2022-03-29 PROCEDURE — 94640 AIRWAY INHALATION TREATMENT: CPT

## 2022-03-29 PROCEDURE — 250N000013 HC RX MED GY IP 250 OP 250 PS 637: Performed by: INTERNAL MEDICINE

## 2022-03-29 PROCEDURE — 250N000013 HC RX MED GY IP 250 OP 250 PS 637: Performed by: SURGERY

## 2022-03-29 PROCEDURE — 99232 SBSQ HOSP IP/OBS MODERATE 35: CPT | Performed by: HOSPITALIST

## 2022-03-29 PROCEDURE — 36415 COLL VENOUS BLD VENIPUNCTURE: CPT | Performed by: HOSPITALIST

## 2022-03-29 PROCEDURE — 999N000157 HC STATISTIC RCP TIME EA 10 MIN

## 2022-03-29 PROCEDURE — 80048 BASIC METABOLIC PNL TOTAL CA: CPT | Performed by: HOSPITALIST

## 2022-03-29 PROCEDURE — 250N000009 HC RX 250: Performed by: PHYSICIAN ASSISTANT

## 2022-03-29 PROCEDURE — 82746 ASSAY OF FOLIC ACID SERUM: CPT | Performed by: HOSPITALIST

## 2022-03-29 PROCEDURE — 82728 ASSAY OF FERRITIN: CPT | Performed by: HOSPITALIST

## 2022-03-29 PROCEDURE — 83550 IRON BINDING TEST: CPT | Performed by: HOSPITALIST

## 2022-03-29 PROCEDURE — 250N000011 HC RX IP 250 OP 636: Performed by: PHYSICIAN ASSISTANT

## 2022-03-29 PROCEDURE — 999N000128 HC STATISTIC PERIPHERAL IV START W/O US GUIDANCE

## 2022-03-29 PROCEDURE — 82607 VITAMIN B-12: CPT | Performed by: HOSPITALIST

## 2022-03-29 RX ORDER — GABAPENTIN 300 MG/1
300 CAPSULE ORAL 3 TIMES DAILY
Qty: 90 CAPSULE | Refills: 3 | Status: SHIPPED | OUTPATIENT
Start: 2022-03-29 | End: 2022-04-22

## 2022-03-29 RX ORDER — ARGININE/GLUTAMINE/CALCIUM BMB 7G-7G-1.5G
1 POWDER IN PACKET (EA) ORAL 2 TIMES DAILY
Qty: 1 PACKET | Refills: 3 | Status: SHIPPED | OUTPATIENT
Start: 2022-03-29 | End: 2022-05-28

## 2022-03-29 RX ADMIN — GABAPENTIN 300 MG: 300 CAPSULE ORAL at 16:01

## 2022-03-29 RX ADMIN — Medication 1 PACKET: at 22:37

## 2022-03-29 RX ADMIN — IPRATROPIUM BROMIDE AND ALBUTEROL SULFATE 3 ML: .5; 3 SOLUTION RESPIRATORY (INHALATION) at 19:56

## 2022-03-29 RX ADMIN — PIPERACILLIN SODIUM AND TAZOBACTAM SODIUM 3.38 G: 3; .375 INJECTION, POWDER, LYOPHILIZED, FOR SOLUTION INTRAVENOUS at 06:17

## 2022-03-29 RX ADMIN — HYDROMORPHONE HYDROCHLORIDE 2 MG: 2 TABLET ORAL at 16:01

## 2022-03-29 RX ADMIN — BUPROPION HYDROCHLORIDE 150 MG: 150 TABLET, EXTENDED RELEASE ORAL at 09:35

## 2022-03-29 RX ADMIN — FUROSEMIDE 10 MG: 20 TABLET ORAL at 16:01

## 2022-03-29 RX ADMIN — PIPERACILLIN SODIUM AND TAZOBACTAM SODIUM 3.38 G: 3; .375 INJECTION, POWDER, LYOPHILIZED, FOR SOLUTION INTRAVENOUS at 01:50

## 2022-03-29 RX ADMIN — SENNOSIDES AND DOCUSATE SODIUM 1 TABLET: 50; 8.6 TABLET ORAL at 09:35

## 2022-03-29 RX ADMIN — ROPINIROLE HYDROCHLORIDE 0.5 MG: 0.5 TABLET, FILM COATED ORAL at 09:35

## 2022-03-29 RX ADMIN — Medication 1 CAPSULE: at 22:36

## 2022-03-29 RX ADMIN — ACETAMINOPHEN 975 MG: 325 TABLET ORAL at 13:24

## 2022-03-29 RX ADMIN — PANTOPRAZOLE SODIUM 40 MG: 40 TABLET, DELAYED RELEASE ORAL at 09:35

## 2022-03-29 RX ADMIN — Medication 1 CAPSULE: at 10:16

## 2022-03-29 RX ADMIN — SPIRONOLACTONE 25 MG: 25 TABLET ORAL at 09:35

## 2022-03-29 RX ADMIN — ROPINIROLE HYDROCHLORIDE 0.5 MG: 0.5 TABLET, FILM COATED ORAL at 13:25

## 2022-03-29 RX ADMIN — PREGABALIN 100 MG: 100 CAPSULE ORAL at 09:35

## 2022-03-29 RX ADMIN — IPRATROPIUM BROMIDE AND ALBUTEROL SULFATE 3 ML: .5; 3 SOLUTION RESPIRATORY (INHALATION) at 07:50

## 2022-03-29 RX ADMIN — IPRATROPIUM BROMIDE AND ALBUTEROL SULFATE 3 ML: .5; 3 SOLUTION RESPIRATORY (INHALATION) at 12:27

## 2022-03-29 RX ADMIN — B-COMPLEX W/ C & FOLIC ACID TAB 1 TABLET: TAB at 09:36

## 2022-03-29 RX ADMIN — Medication 250 MCG: at 09:35

## 2022-03-29 RX ADMIN — PIPERACILLIN SODIUM AND TAZOBACTAM SODIUM 3.38 G: 3; .375 INJECTION, POWDER, LYOPHILIZED, FOR SOLUTION INTRAVENOUS at 13:25

## 2022-03-29 RX ADMIN — SENNOSIDES AND DOCUSATE SODIUM 1 TABLET: 50; 8.6 TABLET ORAL at 21:55

## 2022-03-29 RX ADMIN — PREGABALIN 100 MG: 100 CAPSULE ORAL at 21:56

## 2022-03-29 RX ADMIN — GABAPENTIN 300 MG: 300 CAPSULE ORAL at 09:36

## 2022-03-29 RX ADMIN — POTASSIUM CHLORIDE 40 MEQ: 1500 TABLET, EXTENDED RELEASE ORAL at 09:35

## 2022-03-29 RX ADMIN — CITALOPRAM HYDROBROMIDE 40 MG: 20 TABLET, FILM COATED ORAL at 09:35

## 2022-03-29 RX ADMIN — HYDROMORPHONE HYDROCHLORIDE 4 MG: 2 TABLET ORAL at 01:48

## 2022-03-29 RX ADMIN — GABAPENTIN 300 MG: 300 CAPSULE ORAL at 21:56

## 2022-03-29 RX ADMIN — ACETAMINOPHEN 975 MG: 325 TABLET ORAL at 21:56

## 2022-03-29 RX ADMIN — FUROSEMIDE 10 MG: 20 TABLET ORAL at 09:33

## 2022-03-29 RX ADMIN — ACETAMINOPHEN 975 MG: 325 TABLET ORAL at 06:14

## 2022-03-29 RX ADMIN — POLYETHYLENE GLYCOL 3350 17 G: 17 POWDER, FOR SOLUTION ORAL at 09:37

## 2022-03-29 RX ADMIN — CYANOCOBALAMIN TAB 1000 MCG 1000 MCG: 1000 TAB at 09:35

## 2022-03-29 RX ADMIN — POTASSIUM CHLORIDE 20 MEQ: 1500 TABLET, EXTENDED RELEASE ORAL at 21:56

## 2022-03-29 RX ADMIN — MULTIPLE VITAMINS W/ MINERALS TAB 1 TABLET: TAB at 09:36

## 2022-03-29 RX ADMIN — PIPERACILLIN SODIUM AND TAZOBACTAM SODIUM 3.38 G: 3; .375 INJECTION, POWDER, LYOPHILIZED, FOR SOLUTION INTRAVENOUS at 18:17

## 2022-03-29 RX ADMIN — Medication 1 PACKET: at 09:37

## 2022-03-29 RX ADMIN — SIMVASTATIN 40 MG: 40 TABLET, FILM COATED ORAL at 21:57

## 2022-03-29 RX ADMIN — ROPINIROLE HYDROCHLORIDE 1 MG: 0.5 TABLET, FILM COATED ORAL at 21:55

## 2022-03-29 ASSESSMENT — ACTIVITIES OF DAILY LIVING (ADL)
ADLS_ACUITY_SCORE: 9
ADLS_ACUITY_SCORE: 11
ADLS_ACUITY_SCORE: 9
ADLS_ACUITY_SCORE: 11
ADLS_ACUITY_SCORE: 9
ADLS_ACUITY_SCORE: 11
ADLS_ACUITY_SCORE: 11
ADLS_ACUITY_SCORE: 9
ADLS_ACUITY_SCORE: 11
ADLS_ACUITY_SCORE: 9
ADLS_ACUITY_SCORE: 8
ADLS_ACUITY_SCORE: 8
ADLS_ACUITY_SCORE: 9
ADLS_ACUITY_SCORE: 9
ADLS_ACUITY_SCORE: 8
ADLS_ACUITY_SCORE: 8
ADLS_ACUITY_SCORE: 11
ADLS_ACUITY_SCORE: 8
ADLS_ACUITY_SCORE: 8
ADLS_ACUITY_SCORE: 11
ADLS_ACUITY_SCORE: 8
ADLS_ACUITY_SCORE: 8
ADLS_ACUITY_SCORE: 11
ADLS_ACUITY_SCORE: 8

## 2022-03-29 NOTE — PROGRESS NOTES
Discussed reviewed the history of surgical procedure, did not perform split-thickness skin grafting has not adequate wound bed preparation of the right lower extremity.  Plan split-thickness skin graft in approximately 3 to 4 weeks, can plan to discharge to home on March 30 of March 31, will need to spend prolonged periods of time elevating right ankle above hip to minimize lower extremity edema related to chronic lymphedema to maximize wound bed granulation tissue development to ultimately perform split-thickness skin grafting.  She will undergo 2 layer wrap dressing changes on the right lower extremity at our wound clinic on a weekly basis.  Ara

## 2022-03-29 NOTE — DISCHARGE INSTRUCTIONS
Elevate right ankle above hip at all times except when ambulating, critical to assisting and increased healing of right lower extremity ulceration in preparation for split-thickness skin graft.

## 2022-03-29 NOTE — PLAN OF CARE
Pt came from PACU for R leg I&D. A/Ox4. VSS on RA except low grade temp of 100.3 intermittently but refused Tylenol. Pain is managed by scheduled Tylenol. Not OOB yet. CMS intact. R leg dressing CDI. Good appetite. Amato intact with good output. On tele w/ NSR.

## 2022-03-29 NOTE — PROGRESS NOTES
[facility]    Medicine Progress Note - Hospitalist Service    Date of Admission:  3/21/2022    Assessment & Plan          Elizabeth Kelley is a 76 year old female with PMHx MS, chronic BLE lymphedema with venous insufficiency and chronic R leg cellulitis, GERD, hyperlipidemia, and depression who underwent previously scheduled excisional debridement of RLE cicumferential venous hypertensive ulceration and application of wound vac on 3/21/22. Hospitalist service re-consulted 3/23/22 for decreased responsiveness, meeting sepsis criteria with suspected pneumonia as source. She was started on empiric treatment with improvement in her level of unreponsiveness and resolution of her hypoxemia.      Sepsis secondary to CAP vs aspiration pneumonia  Acute hypoxic respiratory failure secondary to above, improved  Hx of MRSA: Tmax 101.5, tachycardic in the 110s, hypoxic to 86% on room air. WBC 18.3, procal 0.17. Lactic 1.3. UA with trace LE, WBC 7. . VBG 7.33/54/57/28. CXR with patchy airspace opacities in the left mid and lower lung, suspicious for pneumonia.  Highest suspicion for pneumonia given the above, though note hx of recurrent RLE cellulitis with procedure as above. Hx of MRSA. RLE with wound vac in place.   Was volume resusciated and started on antibiotics including zosyn and vancomycin, and since narrowed to zosyn  Blood cultures- NGTD  Repeat CXR on 3/28 notable for new right upper lobe infiltrate and basilar infiltrate lateral views  Did receive some diuretic for the volume overload  plan  - RCAT consulted, encourage pulmonary toilet with IS and acapella   - Duonebs q4 hrs while awake, PRN albuterol nebs   - monitor fluid status  - resumed PTA Lasix and Aldactone  - complete course of zosyn  - repeat CBC in the AM  - Robitussin prn  - on RA, will ask PT to ambulate  - SLP consult- no evidence of dysphagia     Encephalopathy, multifactorial- resolved  Acute hypoxic and hypercapnic  respiratory failure- multifactorial  Altered mentation noted by nursing during hosptialization  Multifactorial in the setting of fever, narcotic pain medications, suspected underlying BOLIVAR, possible dehydration. Received dose of narcan, but did not significantly change pts mentation immediately, though seemed to improve within the hour after IVF bolus was near completion. Oriented X2 initially on my exam, drifting to sleep midway through conversation, but easily arousable. Mentation improved upon reevaluation within 30 minutes.   plan  - continue expectant management  - judicious pain med use  -bipap prn  - suspect underlying BOLIVAR, urged her to get evaluated as outpatient  - resumed PTA Wellbutrin, Lyrica and Requip as per the patient's request but will closely monitor mentation, especially if she will get pain medications; low threshold to hold these meds if she becomes confused/lethargic   - BiPAP prn     Acute blood loss anemia  Chronic normocytic anemia  Baseline 10-11. Down to 8.3 post-procedure. No active bleeding. Could be dilutional as checked while receiving IVF bolus   3/24- Hb down to 6.1 but repeated Hb 7.1, again down to 6.4  transfused 1 unit PRBCs  transfused another unit PRBC--3/27  Plan  - monitor cbc in the AM  - discontinued scheduled Celebrex  - transfuse for < 7  - iron studies and replete if low     Chronic BLE lymphedema with venous insufficiency and chronic R leg cellulitis   S/p excisional debridement of RLE cicumferential venous hypertensive ulceration and application of wound vac (3/21/22)  S/p intraoperative wound examination, wound debridement, and application of myriad 2 layer graft on 3/28  Defer routine post-operative cares to Dr. Bullock   - judicious pain control  -  Gabapentin 300 mg po TID  - also has Toradol available q6h prn (monitor Hb)  - lyrical 100 bid  - defer wound cares to Dr. Bullock  - continues on lasix and aldactone  - also resumed PTA KCl supplementation 40 meq po in am  "and 20 mEq po every evening    Severe Obesity: Estimated body mass index is 50.81 kg/m  as calculated from the following:    Height as of this encounter: 1.549 m (5' 1\").    Weight as of this encounter: 122 kg (268 lb 14.4 oz).       MS: Ambulatory at baseline, though some weakness in lower extremities and intermittent upper extremity weakness.     Hyponatremia, mild, resolved  - Na 132 on 3/23--improved to 136--139  - BMP in am    GERD: Continue Protonix     Hyperlipidemia: Continue statin     Depression:PTA Celexa, Wellbutrin and Lyrica held few days ago given AMS, resumed on 3/26 as mentation back to baseline.    RLS:  PTA scheduled Requip during the daytime held when she had AMS; she asked to resume PTA Requip 0.5 mg po BID during the day and 1 mg po at bedtime.       Diet: Advance Diet as Tolerated: Regular Diet Adult    DVT Prophylaxis: As per primary  Amato Catheter: PRESENT, indication: Other (Comment);Strict 1-2 Hour I&O  Central Lines: None  Cardiac Monitoring: ACTIVE order. Indication: hypoxic hypercapneic resp failure  Code Status: Full Code      Disposition Plan   Expected Discharge: 03/30/2022     Anticipated discharge location: home with family    Delays:       The patient's care was discussed with the Bedside Nurse and Patient.    Nash Carranza DO  Hospitalist Service  [facility]  Securely message with the Vocera Web Console (learn more here)  Text page via [employer] Paging/Directory     Clinically Significant Risk Factors Present on Admission                    ______________________________________________________________________    Interval History    Doing well, completely on room air without respiratory complaints.      Data reviewed today: I reviewed all medications, new labs and imaging results over the last 24 hours. I personally reviewed no images or EKG's today.    Physical Exam   Vital Signs: Temp: 97.7  F (36.5  C) Temp src: Oral BP: 131/66 Pulse: 65   Resp: " 20 SpO2: 95 % O2 Device: None (Room air) Oxygen Delivery: 3 LPM  Weight: 277 lbs 5.42 oz    CONSTITUTIONAL: awake, alert, laying in bed, NAD  HEENT: Normocephalic, atraumatic.   CARDIOVASCULAR: S1S2, RRR, no murmurs  RESPIRATORY: diminished air entry at bases, mild bibasilar crackles, no wheezing, no wheezing, no rales.  GASTROINTESTINAL:  Abdomen soft, obese, non-distended. BS auscultated in all four quadrants. Negative for tenderness to palpation.  No masses or organomegaly noted.  MUSCULOSKELETAL: No gross deformities noted. Normal muscle tone.   EXTREMITIES: RLE covered with dressing, oozing some serosanguinous discharge, LLE- chronic indurated skin changes  NEUROLOGIC: Alert and oriented to person, place, and time.  strength intact. No focal neuro deficits.   PSYCH- normal mood       Data   Recent Labs   Lab 03/29/22  0635 03/29/22  0606 03/29/22  0400 03/28/22  0553 03/27/22  1413 03/27/22  0551 03/26/22  1212 03/26/22  0736 03/24/22  0534 03/23/22  1814   WBC 10.7  --   --   --   --  9.7  --  8.2   < >  --    HGB 7.4*  --   --  7.6* 7.5* 6.7*  --  7.1*   < >  --    MCV 87  --   --   --   --  89  --  85   < >  --      --   --   --   --  344  --  328   < >  --      --   --   --   --  137  --  139   < >  --    POTASSIUM 4.0  --   --  3.7  --  4.0   < > 3.4   < >  --    CHLORIDE 104  --   --   --   --  104  --  103   < >  --    CO2 31  --   --   --   --  30  --  30   < >  --    BUN 23  --   --   --   --  24  --  24   < >  --    CR 0.98  --   --  1.11*  --  0.98  --  1.03   < >  --    ANIONGAP 3  --   --   --   --  3  --  6   < >  --    JUNI 9.0  --   --   --   --  8.9  --  8.8   < >  --    * 124* 120*  --   --  110*  --  102*   < >  --    ALBUMIN  --   --   --   --   --   --   --   --   --  2.0*   PROTTOTAL  --   --   --   --   --   --   --   --   --  5.8*   BILITOTAL  --   --   --   --   --   --   --   --   --  0.2   ALKPHOS  --   --   --   --   --   --   --   --   --  156*   ALT  --   --    --   --   --   --   --   --   --  29   AST  --   --   --   --   --   --   --   --   --  63*    < > = values in this interval not displayed.     No results found for this or any previous visit (from the past 24 hour(s)).  Medications     - MEDICATION INSTRUCTIONS -       - MEDICATION INSTRUCTIONS -         acetaminophen  975 mg Oral Q8H     [Held by provider] aspirin  81 mg Oral QPM     buPROPion  150 mg Oral QAM     citalopram  40 mg Oral QAM     cyanocobalamin  1,000 mcg Oral Daily     diosmin-hesperidin 450-50  1 capsule Oral BID     [Held by provider] enoxaparin ANTICOAGULANT  40 mg Subcutaneous Q24H     furosemide  10 mg Oral BID     gabapentin  300 mg Oral TID     ipratropium - albuterol 0.5 mg/2.5 mg/3 mL  3 mL Nebulization Q4H While awake     Aleksandr  1 packet Oral BID     multivitamin w/minerals  1 tablet Oral Daily     pantoprazole  40 mg Oral Daily     piperacillin-tazobactam  3.375 g Intravenous Q6H     polyethylene glycol  17 g Oral Daily     potassium chloride ER  20 mEq Oral QPM     potassium chloride ER  40 mEq Oral QAM     pregabalin  100 mg Oral BID     rOPINIRole  0.5 mg Oral BID     rOPINIRole  1 mg Oral At Bedtime     senna-docusate  1 tablet Oral BID     simvastatin  40 mg Oral At Bedtime     sodium chloride (PF)  3 mL Intracatheter Q8H     sodium chloride (PF)  3 mL Intracatheter Q8H     spironolactone  25 mg Oral Daily     vitamin B complex with vitamin C  1 tablet Oral Daily     Vitamin D3  250 mcg Oral Daily

## 2022-03-29 NOTE — PLAN OF CARE
A/Ox4. VSS on RA now, sating good at 92-96%%. Up 1-A to BR w/ GB and walker. CMS intact. Dressing CDI. Pt refused to change dressing, per pt and daughter the surgeon told them that no dressing changes to be done for now. Dressing CDI. Good appetite. Pain is managed by scheduled Tylenol and PRN Dilaudid. Amato in place with good urine output. On tele NSR.

## 2022-03-29 NOTE — DISCHARGE SUMMARY
Discharge summary  Admission date March 21st 2022  Discharge date March 30, 2022  Hospital course the patient was admitted for surgical debridement of extensive biofilm and necrotic tissue over chronic right lower extremity venous hypertensive ulceration associated with chronic lymphedema, elevated BMI, extensive ulceration measure approximately 500 cm .  Surgical debridement was accomplished, subsequently VAC vera flow with hypochlorous acid installation was initiated.  It was planned to return to the patient operating room on March 24 for dressing change and redebridement however she developed a left mid and lower lobe pneumonia with associated relative hypotension, sepsis protocol was initiated, patient was evaluated, subsequently transferred to the observation component of the medical intensive care unit, appropriate IV antibiotics was initiated, patient received approximately 4 L of IV fluid via the sepsis protocol, subsequent additional transfusion for anemia.  Patient was returned to the operating room on March 28 and underwent intraoperative evaluation, debridement, excellent progression of granulation tissue, no recurrent biofilm or necrotic tissue however not adequate granulation tissue for performance of split-thickness skin grafting.  Ovine forgot.  Was applied to the wound after debridement, 2 layer wrap, and EdemaWear to the thigh was applied.  Anticipate split-thickness skin grafting approximately 3 in 6 weeks when adequate granulation tissue development with ongoing outpatient weekly dressing changes for compression then patient will be required to spend prolonged periods of time with ankle above hip to minimize lower extremity edema which impairs range of motion tissue development.  Medication reconciliation completed with the assistance of the hospital service.  Patient will continue to follow-up weekly with repeat wound healing Vesper for dressing changes and will establish a operative date for  split-thickness skin grafting, harvest from the right thigh to the right lower extremity when adequate granulation tissue development.    Ara

## 2022-03-29 NOTE — PROGRESS NOTES
Pt. A/O x4. VSS on on 3Lo2. Pain managed by scheduled tylenol. CMS intact. On garrison with good output.  Dressing CDI.

## 2022-03-29 NOTE — PROGRESS NOTES
Antimicrobial Stewardship Team Note    Antimicrobial Stewardship Program - A joint venture between [facility] and [facility] [employer] to optimize antibiotic management.     Patient: Elizabeth Kelley  MRN: [id number]  Allergies: Other environmental allergy, Adhesive tape, Adhesive [mecrylate], Percocet [oxycodone-acetaminophen], and Vicodin [hydrocodone-acetaminophen]    Brief Summary: Elizabeth Kelley is a 76 year old female admitted on 3/21/22 with PMH significant for MS, chronic BLE lymphedemia with venous insufficiency and chronic R leg cellulitis who underwent scheduled excisional debridement of RLE circumferential venous hypertensive ulceration and application of wound vac on 3/21.    She spiked a fever to 101.5F on 3/23 and became hypoxic to 86% on room air. Leukocytosis to 18.3, procalcitonin 0.17. CXR with patchy airspace opacities in the left mid and lower lung, suspicious for pneumonia. Patient was started on Zosyn and vancomycin for pneumonia and/or possible recurrent RLE cellulitis. Repeat CXR on 3/28 notable for new right upper lobe infiltrate and basilar infiltrate in lateral views. On 3/28, she underwent wound debridement and application of myriad 2 layer graft of RLE.    Cultures with no growth. Patient has remained afebrile since 3/25 with normal WBC, currently on 3 LPM O2.         Active Anti-infective Medications   (From admission, onward)                 Start     Stop    03/23/22 1900  vancomycin 1500 mg  1,500 mg,   Intravenous,   EVERY 24 HOURS        Skin and Soft Tissue Infection        --    03/23/22 1700  piperacillin-tazobactam  3.375 g,   Intravenous,   EVERY 6 HOURS        Skin and Soft Tissue Infection        --                  Assessment: Possible pneumonia vs recurrent RLE cellulitis.  Patient presented for planned procedure, spiked a fever on POD2 with leukocytosis and increased O2 requirements and imaging concerning for possible pneumonia. She was started  on vancomycin and Zosyn and is now on day 7 of Zosyn. Sputum cultures were not obtained and blood cultures have no growth. Patient has completed an adequate empiric course of broad spectrum antibiotics to cover for both pneumonia and cellulitis. Operative note from 3/28 did not indicate concern for ongoing cellulitis infection. Recommend stopping Zosyn and monitoring for signs/symptoms of continued infection.    Recommendations:  Stop Zosyn as patient has completed adequate empiric 7 day course.    Discussed with ID Staff MD Heather Pastrana, PharmD    Vital Signs/Clinical Features:  Vitals  Report        03/27 0700  03/28 0659 03/28 0700  03/29 0659 03/29 0700  03/29 1048   Most Recent      Temp ( F) 97.5 -  98.9    98 -  100.3      97.7     97.7 (36.5) 03/29 0738    Pulse 68 -  80    23 -  87      65     65 03/29 0738    Resp 13 -  31    8 -  26      20     20 03/29 0738    BP 98/75 -  124/83    108/54 -  163/90      131/66     131/66 03/29 0738    SpO2 (%) 90 -  99    91 -  100      95     95 03/29 0738            Labs  Estimated Creatinine Clearance: 62.8 mL/min (based on SCr of 0.98 mg/dL).  Recent Labs   Lab Test 03/24/22  0921 03/25/22  0603 03/26/22  0736 03/27/22  0551 03/28/22  0553 03/29/22  0635   CR 0.95 1.07* 1.03 0.98 1.11* 0.98       Recent Labs   Lab Test 03/23/22  1626 03/24/22  0922 03/24/22  1123 03/25/22  0603 03/25/22  0838 03/26/22  0736 03/27/22  0551 03/27/22  1413 03/28/22  0553 03/29/22  0635   WBC 18.3* 11.9*  --  9.0  --  8.2 9.7  --   --  10.7   HGB 8.3* 6.1*   < > 6.7* 7.1* 7.1* 6.7* 7.5* 7.6* 7.4*   HCT 28.8* 21.1*  --  21.9*  --  23.3* 23.0* 24.8*  --  24.4*   MCV 89 89  --  85  --  85 89  --   --  87    269  --  283  --  328 344  --   --  372    < > = values in this interval not displayed.       Recent Labs   Lab Test 06/14/18  1024 05/02/19  0944 09/26/20  1017 08/02/21  1552 08/10/21  1230 09/07/21  1000 09/14/21  1135 09/21/21  1615 09/28/21  1130  10/06/21  1525 10/11/21  1350 10/20/21  0835 03/23/22  1814   BILITOTAL 0.5 0.4 0.4 0.4  --   --   --   --   --   --   --   --  0.2   ALKPHOS 82 88 83 156*  --  152*  --   --   --   --   --   --  156*   ALBUMIN 4.1 3.9 3.9 3.4*  --   --   --   --   --   --   --   --  2.0*   AST 19 22 16 18   < > 24   < > 17 17 17 16 16 63*   ALT 25 26 22 31  --   --   --   --   --   --   --   --  29    < > = values in this interval not displayed.       Recent Labs   Lab Test 09/14/21  1135 09/21/21  1615 09/28/21  1130 10/06/21  1525 10/11/21  1350 10/20/21  0835 03/21/22  1609 03/23/22  1626 03/24/22  1944 03/26/22  0736   PCAL  --   --   --   --   --   --   --  0.17*  --   --    LACT  --   --   --   --   --   --  1.0 1.3 1.1 1.2   CRP 4.1* 3.0* 2.9* 4.2* 2.8* 31.7*  --   --   --   --    SED 67* 60* 62* 71* 64* 55*  --   --   --   --        Recent Labs   Lab Test 10/20/21  0835   VANCOMYCIN 18.5       Culture Results:  7-Day Micro Results       Procedure Component Value Units Date/Time    Blood Culture Hand, Left [[id number]]  (Normal) Collected: 03/23/22 1814    Order Status: Completed Lab Status: Final result Updated: 03/29/22 0016    Specimen: Blood from Hand, Left      Culture No Growth    Narrative:      Only an Aerobic Blood Culture Bottle was collected, interpret results with caution.      Blood Culture Hand, Right [[id number]] Collected: 03/23/22 1814    Order Status: Canceled Lab Status: No result Updated: 03/23/22 1831    Specimen: Blood from Hand, Right             Recent Labs   Lab Test 03/23/22  1700   URINEPH 6.0   NITRITE Negative   LEUKEST Trace*   WBCU 7*                         Imaging: XR Chest 2 Views    Result Date: 3/28/2022  CHEST TWO VIEWS 3/28/2022 1:54 PM HISTORY: Pneumonia COMPARISON: 3/24/2022     IMPRESSION: New right upper lobe infiltrate. Basilar infiltrate seen on lateral view, probably on the left. The cardiac silhouette is not enlarged. Pulmonary vasculature is unremarkable. TIMMY GLASS MD    SYSTEM ID:  [id number]    XR Chest Port 1 View    Result Date: 3/24/2022  EXAM: XR CHEST PORT 1 VIEW LOCATION: [facility] DATE/TIME: 3/24/2022 1:15 AM INDICATION: Concern for volume overload is status post IV fluid resuscitation. COMPARISON: 10/13/2021     IMPRESSION: Normal heart size and pulmonary vascularity. No evidence for overt CHF/I'm overload. Minimal linear atelectasis left lung base. Mild tortuous aorta. No overt osseous abnormality.    XR Chest Port 1 View    Result Date: 3/23/2022  CHEST ONE VIEW PORTABLE   3/23/2022 4:32 PM HISTORY:  Lethargy. COMPARISON: None.     IMPRESSION: Shallow inspiration accentuates heart size and pulmonary vascularity. Patchy airspace opacities in the left mid and lower lung are suspicious for pneumonia. No pneumothorax. Degenerative changes right shoulder. EFREN MONDRAGON MD   SYSTEM ID:  [id number]

## 2022-03-30 ENCOUNTER — APPOINTMENT (OUTPATIENT)
Dept: PHYSICAL THERAPY | Facility: CLINIC | Age: 77
DRG: 264 | End: 2022-03-30
Attending: SURGERY
Payer: COMMERCIAL

## 2022-03-30 LAB
CREAT SERPL-MCNC: 1.14 MG/DL (ref 0.52–1.04)
GFR SERPL CREATININE-BSD FRML MDRD: 50 ML/MIN/1.73M2
GLUCOSE BLDC GLUCOMTR-MCNC: 102 MG/DL (ref 70–99)
GLUCOSE BLDC GLUCOMTR-MCNC: 98 MG/DL (ref 70–99)

## 2022-03-30 PROCEDURE — 99233 SBSQ HOSP IP/OBS HIGH 50: CPT | Performed by: HOSPITALIST

## 2022-03-30 PROCEDURE — 97530 THERAPEUTIC ACTIVITIES: CPT | Mod: GP | Performed by: PHYSICAL THERAPIST

## 2022-03-30 PROCEDURE — 250N000013 HC RX MED GY IP 250 OP 250 PS 637: Performed by: SURGERY

## 2022-03-30 PROCEDURE — 250N000011 HC RX IP 250 OP 636: Performed by: PHYSICIAN ASSISTANT

## 2022-03-30 PROCEDURE — 250N000009 HC RX 250: Performed by: PHYSICIAN ASSISTANT

## 2022-03-30 PROCEDURE — 250N000013 HC RX MED GY IP 250 OP 250 PS 637: Performed by: INTERNAL MEDICINE

## 2022-03-30 PROCEDURE — 999N000157 HC STATISTIC RCP TIME EA 10 MIN

## 2022-03-30 PROCEDURE — 250N000013 HC RX MED GY IP 250 OP 250 PS 637: Performed by: PHYSICIAN ASSISTANT

## 2022-03-30 PROCEDURE — 94640 AIRWAY INHALATION TREATMENT: CPT

## 2022-03-30 PROCEDURE — 97116 GAIT TRAINING THERAPY: CPT | Mod: GP | Performed by: PHYSICAL THERAPIST

## 2022-03-30 PROCEDURE — 82565 ASSAY OF CREATININE: CPT | Performed by: SURGERY

## 2022-03-30 PROCEDURE — 250N000013 HC RX MED GY IP 250 OP 250 PS 637: Performed by: HOSPITALIST

## 2022-03-30 PROCEDURE — 36415 COLL VENOUS BLD VENIPUNCTURE: CPT | Performed by: SURGERY

## 2022-03-30 PROCEDURE — 94640 AIRWAY INHALATION TREATMENT: CPT | Mod: 76

## 2022-03-30 PROCEDURE — 120N000001 HC R&B MED SURG/OB

## 2022-03-30 PROCEDURE — 999N000128 HC STATISTIC PERIPHERAL IV START W/O US GUIDANCE

## 2022-03-30 RX ORDER — FERROUS SULFATE 325(65) MG
325 TABLET ORAL DAILY
Status: DISCONTINUED | OUTPATIENT
Start: 2022-03-30 | End: 2022-04-29 | Stop reason: HOSPADM

## 2022-03-30 RX ADMIN — B-COMPLEX W/ C & FOLIC ACID TAB 1 TABLET: TAB at 08:54

## 2022-03-30 RX ADMIN — PREGABALIN 100 MG: 100 CAPSULE ORAL at 21:04

## 2022-03-30 RX ADMIN — FUROSEMIDE 10 MG: 20 TABLET ORAL at 16:35

## 2022-03-30 RX ADMIN — HYDROMORPHONE HYDROCHLORIDE 4 MG: 2 TABLET ORAL at 22:46

## 2022-03-30 RX ADMIN — PIPERACILLIN SODIUM AND TAZOBACTAM SODIUM 3.38 G: 3; .375 INJECTION, POWDER, LYOPHILIZED, FOR SOLUTION INTRAVENOUS at 13:52

## 2022-03-30 RX ADMIN — ROPINIROLE HYDROCHLORIDE 0.5 MG: 0.5 TABLET, FILM COATED ORAL at 13:53

## 2022-03-30 RX ADMIN — ACETAMINOPHEN 975 MG: 325 TABLET ORAL at 13:52

## 2022-03-30 RX ADMIN — ROPINIROLE HYDROCHLORIDE 0.5 MG: 0.5 TABLET, FILM COATED ORAL at 08:54

## 2022-03-30 RX ADMIN — Medication 1 CAPSULE: at 09:20

## 2022-03-30 RX ADMIN — SIMVASTATIN 40 MG: 40 TABLET, FILM COATED ORAL at 22:47

## 2022-03-30 RX ADMIN — PANTOPRAZOLE SODIUM 40 MG: 40 TABLET, DELAYED RELEASE ORAL at 08:54

## 2022-03-30 RX ADMIN — PIPERACILLIN SODIUM AND TAZOBACTAM SODIUM 3.38 G: 3; .375 INJECTION, POWDER, LYOPHILIZED, FOR SOLUTION INTRAVENOUS at 06:52

## 2022-03-30 RX ADMIN — GABAPENTIN 300 MG: 300 CAPSULE ORAL at 22:46

## 2022-03-30 RX ADMIN — SENNOSIDES AND DOCUSATE SODIUM 1 TABLET: 50; 8.6 TABLET ORAL at 08:54

## 2022-03-30 RX ADMIN — Medication 250 MCG: at 08:55

## 2022-03-30 RX ADMIN — IPRATROPIUM BROMIDE AND ALBUTEROL SULFATE 3 ML: .5; 3 SOLUTION RESPIRATORY (INHALATION) at 08:21

## 2022-03-30 RX ADMIN — IPRATROPIUM BROMIDE AND ALBUTEROL SULFATE 3 ML: .5; 3 SOLUTION RESPIRATORY (INHALATION) at 15:54

## 2022-03-30 RX ADMIN — AMOXICILLIN AND CLAVULANATE POTASSIUM 1 TABLET: 875; 125 TABLET, FILM COATED ORAL at 21:04

## 2022-03-30 RX ADMIN — POLYETHYLENE GLYCOL 3350 17 G: 17 POWDER, FOR SOLUTION ORAL at 08:55

## 2022-03-30 RX ADMIN — FERROUS SULFATE TAB 325 MG (65 MG ELEMENTAL FE) 325 MG: 325 (65 FE) TAB at 12:04

## 2022-03-30 RX ADMIN — POTASSIUM CHLORIDE 20 MEQ: 1500 TABLET, EXTENDED RELEASE ORAL at 21:04

## 2022-03-30 RX ADMIN — IPRATROPIUM BROMIDE AND ALBUTEROL SULFATE 3 ML: .5; 3 SOLUTION RESPIRATORY (INHALATION) at 19:54

## 2022-03-30 RX ADMIN — PREGABALIN 100 MG: 100 CAPSULE ORAL at 08:54

## 2022-03-30 RX ADMIN — ROPINIROLE HYDROCHLORIDE 1 MG: 0.5 TABLET, FILM COATED ORAL at 22:46

## 2022-03-30 RX ADMIN — HYDROMORPHONE HYDROCHLORIDE 4 MG: 2 TABLET ORAL at 00:10

## 2022-03-30 RX ADMIN — GABAPENTIN 300 MG: 300 CAPSULE ORAL at 08:55

## 2022-03-30 RX ADMIN — Medication 1 PACKET: at 21:05

## 2022-03-30 RX ADMIN — CYANOCOBALAMIN TAB 1000 MCG 1000 MCG: 1000 TAB at 08:54

## 2022-03-30 RX ADMIN — POTASSIUM CHLORIDE 40 MEQ: 1500 TABLET, EXTENDED RELEASE ORAL at 08:53

## 2022-03-30 RX ADMIN — Medication 1 PACKET: at 08:56

## 2022-03-30 RX ADMIN — MULTIPLE VITAMINS W/ MINERALS TAB 1 TABLET: TAB at 08:54

## 2022-03-30 RX ADMIN — CITALOPRAM HYDROBROMIDE 40 MG: 20 TABLET, FILM COATED ORAL at 08:55

## 2022-03-30 RX ADMIN — GABAPENTIN 300 MG: 300 CAPSULE ORAL at 16:35

## 2022-03-30 RX ADMIN — ACETAMINOPHEN 975 MG: 325 TABLET ORAL at 22:46

## 2022-03-30 RX ADMIN — PIPERACILLIN SODIUM AND TAZOBACTAM SODIUM 3.38 G: 3; .375 INJECTION, POWDER, LYOPHILIZED, FOR SOLUTION INTRAVENOUS at 00:15

## 2022-03-30 RX ADMIN — BUPROPION HYDROCHLORIDE 150 MG: 150 TABLET, EXTENDED RELEASE ORAL at 08:54

## 2022-03-30 RX ADMIN — ACETAMINOPHEN 975 MG: 325 TABLET ORAL at 06:50

## 2022-03-30 RX ADMIN — Medication 1 CAPSULE: at 22:47

## 2022-03-30 RX ADMIN — IPRATROPIUM BROMIDE AND ALBUTEROL SULFATE 3 ML: .5; 3 SOLUTION RESPIRATORY (INHALATION) at 12:07

## 2022-03-30 RX ADMIN — SPIRONOLACTONE 25 MG: 25 TABLET ORAL at 08:54

## 2022-03-30 RX ADMIN — FUROSEMIDE 10 MG: 20 TABLET ORAL at 08:55

## 2022-03-30 ASSESSMENT — ACTIVITIES OF DAILY LIVING (ADL)
ADLS_ACUITY_SCORE: 11

## 2022-03-30 NOTE — PROGRESS NOTES
New onset hematuria, denies chest pain, hemoptysis, oxygenation status on room air adequate.  Lower extremity dressing dry, right.  Plan right lower extremity dressing change on April 1, 2022, the patient still in the hospital, is to be performed by our wound clinic nursing staff versus in the clinic at 1:30 PM.  Pain control with Neurontin.  Nutritional support.  Anticipated discharge on oral antibiotics given pneumonia, this would be adequate to cover lower extremity cellulitis.  Ara

## 2022-03-30 NOTE — PROGRESS NOTES
"SPIRITUAL HEALTH SERVICES Progress Note  [facility]    Follow-Up visit per Ptnt request.    Bina shared that she was a little disappointed to not be going home yet, and she said she would really like to talk, but \"now is not a good time\" because of discomfort.     I offered to ask someone in  to visit at another time, which she welcomed.     will follow.    Rev Deysi Oilver  Associate   Spiritual Health Phone Line [phone]  Spiritual Health Pager [phone]  "

## 2022-03-30 NOTE — PROGRESS NOTES
Pt. A/Ox4 . VSS on RA. Up A-1 Gait belt walker. CMA intact. Pain managed by scheduled tylenol and PRN dilaudid.

## 2022-03-30 NOTE — PROGRESS NOTES
[facility]    Medicine Progress Note - Hospitalist Service    Date of Admission:  3/21/2022    Assessment & Plan        Elizabeth Kelley is a 76 year old female with PMHx MS, chronic BLE lymphedema with venous insufficiency and chronic R leg cellulitis, GERD, hyperlipidemia, and depression who underwent previously scheduled excisional debridement of RLE cicumferential venous hypertensive ulceration and application of wound vac on 3/21/22. Hospitalist service re-consulted 3/23/22 for decreased responsiveness, meeting sepsis criteria with suspected pneumonia as source. She was started on empiric treatment with improvement in her level of unreponsiveness and resolution of her hypoxemia.      Sepsis secondary to CAP vs aspiration pneumonia  Acute hypoxic respiratory failure secondary to above, improved  Hx of MRSA  Tmax 101.5, tachycardic in the 110s, hypoxic to 86% on room air. WBC 18.3, procal 0.17. Lactic 1.3. UA with trace LE, WBC 7. . VBG 7.33/54/57/28. CXR with patchy airspace opacities in the left mid and lower lung, suspicious for pneumonia.  Highest suspicion for pneumonia given the above, though note hx of recurrent RLE cellulitis with procedure as above. Hx of MRSA. RLE with wound vac in place.  Was volume resusciated and started on antibiotics including zosyn and vancomycin, and since narrowed to zosyn.  Blood cultures- NGTD.  Repeat CXR on 3/28 notable for new right upper lobe infiltrate and basilar infiltrate lateral views.  Did receive some diuretic for the volume overload.    RCAT consulted, encourage pulmonary toilet with IS and acapella.    Duonebs q4 hrs while awake, PRN albuterol nebs.    Monitor fluid status.    Resumed PTA Lasix and Aldactone.     Completes 7 day course of zosyn on 3/30/22.    Repeat CBC in the AM.    Robitussin prn.    On RA, will ask PT to ambulate.    SLP consulted - no evidence of dysphagia.     Encephalopathy, multifactorial- resolved  Acute  hypoxic and hypercapnic respiratory failure- multifactorial  Altered mentation noted by nursing during hosptialization. Multifactorial in the setting of fever, narcotic pain medications, suspected underlying BOLIVAR, possible dehydration. Received dose of narcan, but did not significantly change pts mentation immediately, though seemed to improve within the hour after IVF bolus was near completion. Mental status continues to improved.    Continue expectant management    Judicious pain med use    Bipap prn    Suspect underlying BOLIVAR, urged her to get evaluated as outpatient    Resumed PTA Wellbutrin, Lyrica and Requip as per the patient's request but will closely monitor mentation, especially if she will get pain medications; low threshold to hold these meds if she becomes confused/lethargic      Acute blood loss anemia  Chronic normocytic anemia  Baseline 10-11. Down to 8.3 post-procedure. No active bleeding. Could be dilutional as checked while receiving IVF bolus   3/24 - Hb down to 6.1 but repeated Hb 7.1, again down to 6.4. Transfused 1 unit PRBCs - 3/25/22. Transfused another unit PRBC on 3/27/22 for hemoglobin 6.7.    Discontinued scheduled Celebrex.    Iron studies suggestive of iron deficiency, started on oral iron supplement.    Recheck labs in AM.     Hematuria  Noted in AM on 3/30/22. Creatinine stable at 1/14. Monitor for now. Already on Zosyn, so low suspicion for new UTI. Could be related to trauma/irritation from Amato catheter, consider removing Amato catheter when hematuria clears up.    Chronic BLE lymphedema with venous insufficiency and chronic R leg cellulitis   S/p excisional debridement of RLE cicumferential venous hypertensive ulceration and application of wound vac (3/21/22)  S/p intraoperative wound examination, wound debridement, and application of myriad 2 layer graft on 3/28  Defer routine post-operative cares to Dr. Bullock.    Judicious pain control.    Gabapentin 300 mg po TID.    Also has  "Toradol available q6h prn (monitor Hb).    Lyrica 100 bid.    Defer wound cares to Dr. Bullock.    Continue PTA lasix, aldactone, and potassium supplement.    Severe Obesity  Estimated body mass index is 50.81 kg/m  as calculated from the following:    Height as of this encounter: 1.549 m (5' 1\").    Weight as of this encounter: 122 kg (268 lb 14.4 oz).     Follow up with PCP.      MS    Ambulatory at baseline, though some weakness in lower extremities and intermittent upper extremity weakness.     Hyponatremia, mild, resolved    Sodium 138 on 3/29/22.    GERD    Continue PTA Protonix.    Hyperlipidemia    Continue PTA simvastatin.    Depression    Resumed PTA Celexa, Wellbutrin and Lyrica on 3/26/22. Had been held for a few days ago given AMS, resumed when mentation back to baseline.    RLS    Continue PTA Requip.       Diet: Advance Diet as Tolerated: Regular Diet Adult    DVT Prophylaxis: Defer to primary service  Amato Catheter: PRESENT, indication: Other (Comment);Strict 1-2 Hour I&O  Central Lines: None  Cardiac Monitoring: ACTIVE order. Indication: hypoxic hypercapneic resp failure  Code Status: Full Code      Disposition Plan   Expected Discharge: 03/30/2022, possibly tomorrow pending clinical course     Anticipated discharge location: home with family    Delays:            The patient's care was discussed with the Bedside Nurse and Patient.    Hiren Antonio MD  Hospitalist Service  [facility]  Securely message with the [employer]oc[employer] Web Console (learn more here)  Text page via [employer] Paging/Directory         Clinically Significant Risk Factors Present on Admission                    ______________________________________________________________________    Interval History   Elizabeth Kelley was seen this morning. Overall, she feels better, but also states she just doesn't feel right. Urine is red colored this morning. A little anxious. Some shortness of breath at times, improves with " changes in position. Denies fevers, chest pain, nausea, abdominal pain.    Data reviewed today: I reviewed all medications, new labs and imaging results over the last 24 hours. I personally reviewed no images or EKG's today.    Physical Exam   Vital Signs: Temp: 99.1  F (37.3  C) Temp src: Oral BP: 124/54 Pulse: 71   Resp: 18 SpO2: 92 % O2 Device: None (Room air)    Weight: 277 lbs 5.42 oz  Constitutional: awake, alert, cooperative, no apparent distress, sitting up in a chair with her legs elevated  Respiratory: clear to auscultation anteriorly, no crackles or wheezing  Cardiovascular: regular rate and rhythm, normal S1 and S2, no murmur noted  GI: normal bowel sounds, soft, obese, non-distended, non-tender  : Amato catheter in place with red tinged urine  Skin: warm, dry  Musculoskeletal: no lower extremity pitting edema present, dressing in place on right lower extremity  Neurologic: awake, alert, answers questions appropriately, moves all extremities    Data   Recent Labs   Lab 03/30/22  0640 03/30/22  0614 03/30/22  0200 03/29/22  2147 03/29/22  0635 03/29/22  0400 03/28/22  0553 03/27/22  1413 03/27/22  0551 03/26/22  1212 03/26/22  0736 03/24/22  0534 03/23/22  1814   WBC  --   --   --   --  10.7  --   --   --  9.7  --  8.2   < >  --    HGB  --   --   --   --  7.4*  --  7.6* 7.5* 6.7*  --  7.1*   < >  --    MCV  --   --   --   --  87  --   --   --  89  --  85   < >  --    PLT  --   --   --   --  372  --   --   --  344  --  328   < >  --    NA  --   --   --   --  138  --   --   --  137  --  139   < >  --    POTASSIUM  --   --   --   --  4.0  --  3.7  --  4.0   < > 3.4   < >  --    CHLORIDE  --   --   --   --  104  --   --   --  104  --  103   < >  --    CO2  --   --   --   --  31  --   --   --  30  --  30   < >  --    BUN  --   --   --   --  23  --   --   --  24  --  24   < >  --    CR 1.14*  --   --   --  0.98  --  1.11*  --  0.98  --  1.03   < >  --    ANIONGAP  --   --   --   --  3  --   --   --  3  --  6    < >  --    JUNI  --   --   --   --  9.0  --   --   --  8.9  --  8.8   < >  --    GLC  --  98 102* 162* 120*   < >  --   --  110*  --  102*   < >  --    ALBUMIN  --   --   --   --   --   --   --   --   --   --   --   --  2.0*   PROTTOTAL  --   --   --   --   --   --   --   --   --   --   --   --  5.8*   BILITOTAL  --   --   --   --   --   --   --   --   --   --   --   --  0.2   ALKPHOS  --   --   --   --   --   --   --   --   --   --   --   --  156*   ALT  --   --   --   --   --   --   --   --   --   --   --   --  29   AST  --   --   --   --   --   --   --   --   --   --   --   --  63*    < > = values in this interval not displayed.     Medications     - MEDICATION INSTRUCTIONS -       - MEDICATION INSTRUCTIONS -         acetaminophen  975 mg Oral Q8H     [Held by provider] aspirin  81 mg Oral QPM     buPROPion  150 mg Oral QAM     citalopram  40 mg Oral QAM     cyanocobalamin  1,000 mcg Oral Daily     diosmin-hesperidin 450-50  1 capsule Oral BID     [Held by provider] enoxaparin ANTICOAGULANT  40 mg Subcutaneous Q24H     furosemide  10 mg Oral BID     gabapentin  300 mg Oral TID     ipratropium - albuterol 0.5 mg/2.5 mg/3 mL  3 mL Nebulization Q4H While awake     Aleksandr  1 packet Oral BID     multivitamin w/minerals  1 tablet Oral Daily     pantoprazole  40 mg Oral Daily     piperacillin-tazobactam  3.375 g Intravenous Q6H     polyethylene glycol  17 g Oral Daily     potassium chloride ER  20 mEq Oral QPM     potassium chloride ER  40 mEq Oral QAM     pregabalin  100 mg Oral BID     rOPINIRole  0.5 mg Oral BID     rOPINIRole  1 mg Oral At Bedtime     senna-docusate  1 tablet Oral BID     simvastatin  40 mg Oral At Bedtime     sodium chloride (PF)  3 mL Intracatheter Q8H     sodium chloride (PF)  3 mL Intracatheter Q8H     spironolactone  25 mg Oral Daily     vitamin B complex with vitamin C  1 tablet Oral Daily     Vitamin D3  250 mcg Oral Daily

## 2022-03-30 NOTE — PROGRESS NOTES
WOC follow-up on RLE:     Chart reviewed; noted Dr. Bullock managing RLE with bi-weekly 2-layer wraps, to be done by clinic staff.  WOC will sign off; Dr. Bullock/ [facility] to reconsult WOC prn.

## 2022-03-30 NOTE — PROVIDER NOTIFICATION
"[employer] message sent to Dr. Antonio on behalf of bedside RN: \"Patient had new IV placed today in her hand that has infiltrated. Not sure how much of her Zosyn she received and next dose is due at 1900. Would you like new IV placed? Switch to oral? Please advise, thanks!\"  "

## 2022-03-30 NOTE — PLAN OF CARE
A/Ox4. VSS on RA. Up 1-A to BR w/ GB and walker. CMS intact. R leg dressing CDI, no dressing change for now per Dr. Bullock. Good appetite. Pain is managed by scheduled Tylenol. Amato in place with good urine output. Noted with dark urine this morning, hospitalist was paged. No new orders (see notes). Urine is starting to clear out. On tele NSR. Switched to PO antibiotic d/t her PIV that keeps on infiltrating.

## 2022-03-30 NOTE — PROGRESS NOTES
Patient is on RA with SpO2 in the low to mid 90's. BS clear and diminished. All nebs were given as ordered.  Will cont to follow.  3/30/2022  Fawn Eid, RT

## 2022-03-31 ENCOUNTER — APPOINTMENT (OUTPATIENT)
Dept: PHYSICAL THERAPY | Facility: CLINIC | Age: 77
DRG: 264 | End: 2022-03-31
Attending: SURGERY
Payer: COMMERCIAL

## 2022-03-31 LAB
ANION GAP SERPL CALCULATED.3IONS-SCNC: 5 MMOL/L (ref 3–14)
BUN SERPL-MCNC: 32 MG/DL (ref 7–30)
CALCIUM SERPL-MCNC: 9 MG/DL (ref 8.5–10.1)
CHLORIDE BLD-SCNC: 104 MMOL/L (ref 94–109)
CO2 SERPL-SCNC: 27 MMOL/L (ref 20–32)
CREAT SERPL-MCNC: 0.99 MG/DL (ref 0.52–1.04)
ERYTHROCYTE [DISTWIDTH] IN BLOOD BY AUTOMATED COUNT: 16.2 % (ref 10–15)
GFR SERPL CREATININE-BSD FRML MDRD: 60 ML/MIN/1.73M2
GLUCOSE BLD-MCNC: 94 MG/DL (ref 70–99)
HCT VFR BLD AUTO: 25.8 % (ref 35–47)
HGB BLD-MCNC: 7.7 G/DL (ref 11.7–15.7)
MCH RBC QN AUTO: 26.4 PG (ref 26.5–33)
MCHC RBC AUTO-ENTMCNC: 29.8 G/DL (ref 31.5–36.5)
MCV RBC AUTO: 88 FL (ref 78–100)
PLATELET # BLD AUTO: 412 10E3/UL (ref 150–450)
POTASSIUM BLD-SCNC: 4.2 MMOL/L (ref 3.4–5.3)
RBC # BLD AUTO: 2.92 10E6/UL (ref 3.8–5.2)
SODIUM SERPL-SCNC: 136 MMOL/L (ref 133–144)
WBC # BLD AUTO: 11 10E3/UL (ref 4–11)

## 2022-03-31 PROCEDURE — 85027 COMPLETE CBC AUTOMATED: CPT | Performed by: HOSPITALIST

## 2022-03-31 PROCEDURE — 250N000013 HC RX MED GY IP 250 OP 250 PS 637: Performed by: HOSPITALIST

## 2022-03-31 PROCEDURE — 94640 AIRWAY INHALATION TREATMENT: CPT | Mod: 76

## 2022-03-31 PROCEDURE — 97116 GAIT TRAINING THERAPY: CPT | Mod: GP | Performed by: PHYSICAL THERAPIST

## 2022-03-31 PROCEDURE — 999N000040 HC STATISTIC CONSULT NO CHARGE VASC ACCESS

## 2022-03-31 PROCEDURE — 94640 AIRWAY INHALATION TREATMENT: CPT

## 2022-03-31 PROCEDURE — 250N000013 HC RX MED GY IP 250 OP 250 PS 637: Performed by: SURGERY

## 2022-03-31 PROCEDURE — 99233 SBSQ HOSP IP/OBS HIGH 50: CPT | Performed by: HOSPITALIST

## 2022-03-31 PROCEDURE — 250N000013 HC RX MED GY IP 250 OP 250 PS 637: Performed by: PHYSICIAN ASSISTANT

## 2022-03-31 PROCEDURE — 250N000011 HC RX IP 250 OP 636: Performed by: HOSPITALIST

## 2022-03-31 PROCEDURE — 250N000009 HC RX 250: Performed by: PHYSICIAN ASSISTANT

## 2022-03-31 PROCEDURE — 36415 COLL VENOUS BLD VENIPUNCTURE: CPT | Performed by: HOSPITALIST

## 2022-03-31 PROCEDURE — 250N000013 HC RX MED GY IP 250 OP 250 PS 637: Performed by: INTERNAL MEDICINE

## 2022-03-31 PROCEDURE — 999N000157 HC STATISTIC RCP TIME EA 10 MIN

## 2022-03-31 PROCEDURE — 80048 BASIC METABOLIC PNL TOTAL CA: CPT | Performed by: HOSPITALIST

## 2022-03-31 PROCEDURE — 120N000001 HC R&B MED SURG/OB

## 2022-03-31 PROCEDURE — 999N000127 HC STATISTIC PERIPHERAL IV START W US GUIDANCE

## 2022-03-31 PROCEDURE — 250N000009 HC RX 250: Performed by: SURGERY

## 2022-03-31 RX ORDER — FUROSEMIDE 10 MG/ML
40 INJECTION INTRAMUSCULAR; INTRAVENOUS
Status: DISCONTINUED | OUTPATIENT
Start: 2022-03-31 | End: 2022-04-01

## 2022-03-31 RX ORDER — ASPIRIN 81 MG/1
81 TABLET, CHEWABLE ORAL EVERY EVENING
Status: DISCONTINUED | OUTPATIENT
Start: 2022-03-31 | End: 2022-04-29 | Stop reason: HOSPADM

## 2022-03-31 RX ADMIN — HYDROMORPHONE HYDROCHLORIDE 4 MG: 2 TABLET ORAL at 20:21

## 2022-03-31 RX ADMIN — ACETAMINOPHEN 650 MG: 325 TABLET, FILM COATED ORAL at 15:47

## 2022-03-31 RX ADMIN — SENNOSIDES AND DOCUSATE SODIUM 1 TABLET: 50; 8.6 TABLET ORAL at 09:22

## 2022-03-31 RX ADMIN — GABAPENTIN 300 MG: 300 CAPSULE ORAL at 09:22

## 2022-03-31 RX ADMIN — Medication 250 MCG: at 09:23

## 2022-03-31 RX ADMIN — CITALOPRAM HYDROBROMIDE 40 MG: 20 TABLET, FILM COATED ORAL at 09:21

## 2022-03-31 RX ADMIN — PANTOPRAZOLE SODIUM 40 MG: 40 TABLET, DELAYED RELEASE ORAL at 09:22

## 2022-03-31 RX ADMIN — ACETAMINOPHEN 650 MG: 325 TABLET, FILM COATED ORAL at 20:21

## 2022-03-31 RX ADMIN — Medication 1 PACKET: at 20:14

## 2022-03-31 RX ADMIN — CYANOCOBALAMIN TAB 1000 MCG 1000 MCG: 1000 TAB at 09:22

## 2022-03-31 RX ADMIN — POLYETHYLENE GLYCOL 3350 17 G: 17 POWDER, FOR SOLUTION ORAL at 09:21

## 2022-03-31 RX ADMIN — ROPINIROLE HYDROCHLORIDE 0.5 MG: 0.5 TABLET, FILM COATED ORAL at 13:41

## 2022-03-31 RX ADMIN — IPRATROPIUM BROMIDE AND ALBUTEROL SULFATE 3 ML: .5; 3 SOLUTION RESPIRATORY (INHALATION) at 20:00

## 2022-03-31 RX ADMIN — IPRATROPIUM BROMIDE AND ALBUTEROL SULFATE 3 ML: .5; 3 SOLUTION RESPIRATORY (INHALATION) at 11:09

## 2022-03-31 RX ADMIN — Medication 1 CAPSULE: at 22:09

## 2022-03-31 RX ADMIN — HYDROMORPHONE HYDROCHLORIDE 4 MG: 2 TABLET ORAL at 15:48

## 2022-03-31 RX ADMIN — Medication 1 PACKET: at 09:40

## 2022-03-31 RX ADMIN — AMOXICILLIN AND CLAVULANATE POTASSIUM 1 TABLET: 875; 125 TABLET, FILM COATED ORAL at 09:22

## 2022-03-31 RX ADMIN — IPRATROPIUM BROMIDE AND ALBUTEROL SULFATE 3 ML: .5; 3 SOLUTION RESPIRATORY (INHALATION) at 16:15

## 2022-03-31 RX ADMIN — GABAPENTIN 300 MG: 300 CAPSULE ORAL at 22:09

## 2022-03-31 RX ADMIN — Medication 1 CAPSULE: at 11:07

## 2022-03-31 RX ADMIN — FUROSEMIDE 40 MG: 10 INJECTION, SOLUTION INTRAVENOUS at 11:08

## 2022-03-31 RX ADMIN — MULTIPLE VITAMINS W/ MINERALS TAB 1 TABLET: TAB at 09:22

## 2022-03-31 RX ADMIN — ROPINIROLE HYDROCHLORIDE 0.5 MG: 0.5 TABLET, FILM COATED ORAL at 09:21

## 2022-03-31 RX ADMIN — GABAPENTIN 300 MG: 300 CAPSULE ORAL at 15:47

## 2022-03-31 RX ADMIN — POTASSIUM CHLORIDE 40 MEQ: 1500 TABLET, EXTENDED RELEASE ORAL at 09:22

## 2022-03-31 RX ADMIN — SPIRONOLACTONE 25 MG: 25 TABLET ORAL at 09:22

## 2022-03-31 RX ADMIN — FERROUS SULFATE TAB 325 MG (65 MG ELEMENTAL FE) 325 MG: 325 (65 FE) TAB at 09:22

## 2022-03-31 RX ADMIN — ROPINIROLE HYDROCHLORIDE 1 MG: 0.5 TABLET, FILM COATED ORAL at 22:09

## 2022-03-31 RX ADMIN — B-COMPLEX W/ C & FOLIC ACID TAB 1 TABLET: TAB at 09:22

## 2022-03-31 RX ADMIN — BUPROPION HYDROCHLORIDE 150 MG: 150 TABLET, EXTENDED RELEASE ORAL at 09:21

## 2022-03-31 RX ADMIN — AMOXICILLIN AND CLAVULANATE POTASSIUM 1 TABLET: 875; 125 TABLET, FILM COATED ORAL at 20:14

## 2022-03-31 RX ADMIN — SIMVASTATIN 40 MG: 40 TABLET, FILM COATED ORAL at 22:09

## 2022-03-31 RX ADMIN — PREGABALIN 100 MG: 100 CAPSULE ORAL at 09:22

## 2022-03-31 RX ADMIN — IPRATROPIUM BROMIDE AND ALBUTEROL SULFATE 3 ML: .5; 3 SOLUTION RESPIRATORY (INHALATION) at 07:10

## 2022-03-31 RX ADMIN — FUROSEMIDE 10 MG: 20 TABLET ORAL at 09:21

## 2022-03-31 RX ADMIN — ACETAMINOPHEN 975 MG: 325 TABLET ORAL at 06:43

## 2022-03-31 RX ADMIN — POTASSIUM CHLORIDE 20 MEQ: 1500 TABLET, EXTENDED RELEASE ORAL at 20:14

## 2022-03-31 RX ADMIN — FUROSEMIDE 40 MG: 10 INJECTION, SOLUTION INTRAVENOUS at 16:05

## 2022-03-31 RX ADMIN — PREGABALIN 100 MG: 100 CAPSULE ORAL at 20:14

## 2022-03-31 ASSESSMENT — ACTIVITIES OF DAILY LIVING (ADL)
ADLS_ACUITY_SCORE: 9
ADLS_ACUITY_SCORE: 11
ADLS_ACUITY_SCORE: 9
ADLS_ACUITY_SCORE: 11
ADLS_ACUITY_SCORE: 9
ADLS_ACUITY_SCORE: 11
ADLS_ACUITY_SCORE: 9
ADLS_ACUITY_SCORE: 11

## 2022-03-31 NOTE — PROGRESS NOTES
"[facility] [place] Nurse Note    Subject: Elizabeth Kelley dressing was changed at the bedside    Exam:  /49 (BP Location: Right arm)   Pulse 71   Temp 98.2  F (36.8  C) (Oral)   Resp 20   Ht 1.549 m (5' 1\")   Wt 131.3 kg (289 lb 7.4 oz)   SpO2 96%   BMI 54.69 kg/m      Procedure:   no bleeding occurred. Patient tolerated procedure well.  Procedure : Wound redressed with hydrofera blue ready transfer, spandage and kerra max Application of 2 layer coflex wrap was applied.  Plan: Patient will return to the clinic in 1 weeks time  March 31, 2022    Further instructions from your care team       Elevate right ankle above hip at all times except when ambulating, critical to assisting and increased healing of right lower extremity ulceration in preparation for split-thickness skin graft.    Wear EDEMAWEAR on left leg and bilateral thighs.   Red stripe on thigh and navy stripe on left lower leg      EDEMA WEAR Stockings- apply fresh pair daily  DO NOT CUT OR TRIM STOCKINGS  Remove and set aside stockings, do not throw away  SKIN/WOUND CARE  1. Clean feet and legs with gentle wash  2. Complete any wound or skin treatments  o Spray Rivas Cleanse and Protect on intact skin, especially dry, scaly plaques, massage in, wipe or rinse  o Apply lotions  o Complete wound care  3. Apply Edema Wear from toes to knees with a cuff at the top of the stockings  CARE OF EDEMAWEAR     DO NOT THROW AWAY THE OLD PAIR OF EDEMA WEAR, WASH IT.   o Wash stocking(s) with soap and HOT water. If really soiled use a surgical soap then regular soap and/or you may need to wash several times  o Hang dry       Edema Wear    size stripe color PS #   small navy [id number]   medium yellow [id number]   large red [id number]   X Large aqua [id number]         "

## 2022-03-31 NOTE — PLAN OF CARE
Shift Summary 1900-0730    Admitting Diagnosis: Chronic ulcer of right leg, with fat layer exposed (H) [L97.912]  Pain associated with wound [T14.8XXA, R52]  Ulcer of right lower extremity with fat layer exposed (H) [L97.912]   Vitals VSS  Pain 7/10. Taking Oxy PRN. Last dose 2245  A&Ox4  Voiding Amato with tan cloudy color, clots  Mobility Ast x 1 GB/W  Tele NSR  CMS Intact  Lung Sounds diminished on RA  GI BM, med soft  Dressing clean and intact, drainage from calf         Plan:  Discharge home today with home cares.  Dressing will be changed 4/1 outpatient.

## 2022-03-31 NOTE — PROGRESS NOTES
[facility]    Medicine Progress Note - Hospitalist Service    Date of Admission:  3/21/2022    Assessment & Plan        Elizabeth Kelley is a 76 year old female with PMHx MS, chronic BLE lymphedema with venous insufficiency and chronic R leg cellulitis, GERD, hyperlipidemia, and depression who underwent previously scheduled excisional debridement of RLE cicumferential venous hypertensive ulceration and application of wound vac on 3/21/22. Hospitalist service re-consulted 3/23/22 for decreased responsiveness, meeting sepsis criteria with suspected pneumonia as source. She was started on empiric treatment with improvement in her level of unreponsiveness and resolution of her hypoxemia.      Sepsis secondary to CAP vs aspiration pneumonia  Acute hypoxic respiratory failure secondary to above, improved  Hx of MRSA  Tmax 101.5, tachycardic in the 110s, hypoxic to 86% on room air. WBC 18.3, procal 0.17. Lactic 1.3. UA with trace LE, WBC 7. . VBG 7.33/54/57/28. CXR with patchy airspace opacities in the left mid and lower lung, suspicious for pneumonia.  Highest suspicion for pneumonia given the above, though note hx of recurrent RLE cellulitis with procedure as above. Hx of MRSA. RLE with wound vac in place.  Was volume resusciated and started on antibiotics including zosyn and vancomycin, and since narrowed to zosyn.  Blood cultures- NGTD.  Repeat CXR on 3/28 notable for new right upper lobe infiltrate and basilar infiltrate lateral views.  Did receive some diuretic for the volume overload.    RCAT consulted, encourage pulmonary toilet with IS and acapella.    Duonebs q4 hrs while awake, PRN albuterol nebs.    Monitor fluid status.    Resumed PTA Lasix and Aldactone.     Completed 7 day course of zosyn on 3/30/22. Now on oral augmentin for lower extremity cellulitis.    Robitussin prn.    On RA, will ask PT to ambulate.    SLP consulted - no evidence of dysphagia.     Encephalopathy,  multifactorial- resolved  Acute hypoxic and hypercapnic respiratory failure- multifactorial  Altered mentation noted by nursing during hosptialization. Multifactorial in the setting of fever, narcotic pain medications, suspected underlying BOLIVAR, possible dehydration. Received dose of narcan, but did not significantly change pts mentation immediately, though seemed to improve within the hour after IVF bolus was near completion. Mental status continues to improved.    Continue expectant management.    Judicious pain med use.    Bipap prn.    Suspect underlying BOLIVAR, urged her to get evaluated as outpatient.    Resumed PTA Wellbutrin, Lyrica and Requip as per the patient's request but will closely monitor mentation, especially if she will get pain medications; low threshold to hold these meds if she becomes confused/lethargic.    3/31/22 - more short of breath today, but not hypoxia, ? related to volume overload, diuresing as noted below.     Acute blood loss anemia  Chronic normocytic anemia  Baseline 10-11. Down to 8.3 post-procedure. No active bleeding. Could be dilutional as checked while receiving IVF bolus   3/24 - Hb down to 6.1 but repeated Hb 7.1, again down to 6.4. Transfused 1 unit PRBCs - 3/25/22. Transfused another unit PRBC on 3/27/22 for hemoglobin 6.7.    Discontinued scheduled Celebrex.    Iron studies suggestive of iron deficiency, started on oral iron supplement.    Hemoglobin slowly trending up, 7.7 on 3/31/22.     Hematuria  Noted in AM on 3/30/22. Creatinine stable at 1/14. Monitor for now. Already on Zosyn, so low suspicion for new UTI. Could be related to trauma/irritation from Amato catheter.    Appears to have resolved as of 3/31/22.    Continue Amato catheter today with plan for diuresis, plan to remove when she transitions back to oral lasix.    Chronic BLE lymphedema with venous insufficiency and chronic R leg cellulitis   S/p excisional debridement of RLE cicumferential venous hypertensive  "ulceration and application of wound vac (3/21/22)  S/p intraoperative wound examination, wound debridement, and application of myriad 2 layer graft on 3/28  Defer routine post-operative cares to Dr. Bullock.    Judicious pain control.    Gabapentin 300 mg po TID.    Also has Toradol available q6h prn (monitor Hb).    Lyrica 100 bid.    Defer wound cares to Dr. Bullock. Planning dressing change tomorrow afternoon, either in clinic or the hospital depending on where patient is at the time.    Continue PTA lasix, aldactone, and potassium supplement.    More edematous and short of breath on 3/31/22. Will hold oral lasix and give IV lasix today to increase diuresis.    Severe Obesity  Estimated body mass index is 50.81 kg/m  as calculated from the following:    Height as of this encounter: 1.549 m (5' 1\").    Weight as of this encounter: 122 kg (268 lb 14.4 oz).     Follow up with PCP.      MS    Ambulatory at baseline, though some weakness in lower extremities and intermittent upper extremity weakness.     Hyponatremia, mild, resolved    Sodium 138 on 3/29/22.    GERD    Continue PTA Protonix.    Hyperlipidemia    Continue PTA simvastatin.    Depression    Resumed PTA Celexa, Wellbutrin and Lyrica on 3/26/22. Had been held for a few days ago given AMS, resumed when mentation back to baseline.    RLS    Continue PTA Requip.       Diet: Advance Diet as Tolerated: Regular Diet Adult    DVT Prophylaxis: Defer to primary service  Amato Catheter: PRESENT, indication: Strict 1-2 Hour I&O  Central Lines: None  Cardiac Monitoring: ACTIVE order. Indication: hypoxic hypercapneic resp failure  Code Status: Full Code      Disposition Plan   Expected Discharge: continue care in the hospital today for diuresis with IV lasix, possible discharge home in the next 1-2 days pending progress with diuresis.  Anticipated discharge location: home with family    Delays:            The patient's care was discussed with the Bedside Nurse and " Patient.    Hiren Antonio MD  Hospitalist Service  [facility]  Securely message with the [employer] Web Console (learn more here)  Text page via [employer] Paging/Directory         Clinically Significant Risk Factors Present on Admission                    ______________________________________________________________________    Interval History   Elizabeth Kelley was seen this morning. Feels more short of breath and edematous today. Worried about going home. Denies fevers, chest pain, nausea, abdominal pain.    Data reviewed today: I reviewed all medications, new labs and imaging results over the last 24 hours. I personally reviewed no images or EKG's today.    Physical Exam   Vital Signs: Temp: 98.2  F (36.8  C) Temp src: Oral BP: 104/49 Pulse: 71   Resp: 20 SpO2: 93 % O2 Device: None (Room air)    Weight: 289 lbs 7.42 oz  Constitutional: awake, alert, cooperative, no apparent distress, reclining in a recliner  Respiratory: clear to auscultation bilaterally, no crackles or wheezing  Cardiovascular: regular rate and rhythm, normal S1 and S2, no murmur noted  GI: normal bowel sounds, soft, non-distended, non-tender  : Amato catheter in place with tan colored urine  Skin: warm, dry, dressing in place on right lower extremity  Musculoskeletal: 2-3+ bilateral lower extremity edema  Neurologic: awake, alert, answers questions appropriately    Data   Recent Labs   Lab 03/31/22  0624 03/30/22  0640 03/30/22  0614 03/30/22  0200 03/29/22  2147 03/29/22  0635 03/29/22  0400 03/28/22  0553 03/27/22  1413 03/27/22  0551   WBC 11.0  --   --   --   --  10.7  --   --   --  9.7   HGB 7.7*  --   --   --   --  7.4*  --  7.6*   < > 6.7*   MCV 88  --   --   --   --  87  --   --   --  89     --   --   --   --  372  --   --   --  344     --   --   --   --  138  --   --   --  137   POTASSIUM 4.2  --   --   --   --  4.0  --  3.7  --  4.0   CHLORIDE 104  --   --   --   --  104  --   --   --  104   CO2  27  --   --   --   --  31  --   --   --  30   BUN 32*  --   --   --   --  23  --   --   --  24   CR 0.99 1.14*  --   --   --  0.98  --  1.11*  --  0.98   ANIONGAP 5  --   --   --   --  3  --   --   --  3   JUNI 9.0  --   --   --   --  9.0  --   --   --  8.9   GLC 94  --  98 102*   < > 120*   < >  --   --  110*    < > = values in this interval not displayed.     Medications     - MEDICATION INSTRUCTIONS -       - MEDICATION INSTRUCTIONS -         amoxicillin-clavulanate  1 tablet Oral Q12H ALYSIA     [Held by provider] aspirin  81 mg Oral QPM     buPROPion  150 mg Oral QAM     citalopram  40 mg Oral QAM     cyanocobalamin  1,000 mcg Oral Daily     diosmin-hesperidin 450-50  1 capsule Oral BID     [Held by provider] enoxaparin ANTICOAGULANT  40 mg Subcutaneous Q24H     ferrous sulfate  325 mg Oral Daily     [Held by provider] furosemide  10 mg Oral BID     furosemide  40 mg Intravenous BID     gabapentin  300 mg Oral TID     ipratropium - albuterol 0.5 mg/2.5 mg/3 mL  3 mL Nebulization Q4H While awake     Aleksandr  1 packet Oral BID     multivitamin w/minerals  1 tablet Oral Daily     pantoprazole  40 mg Oral Daily     polyethylene glycol  17 g Oral Daily     potassium chloride ER  20 mEq Oral QPM     potassium chloride ER  40 mEq Oral QAM     pregabalin  100 mg Oral BID     rOPINIRole  0.5 mg Oral BID     rOPINIRole  1 mg Oral At Bedtime     senna-docusate  1 tablet Oral BID     simvastatin  40 mg Oral At Bedtime     sodium chloride (PF)  3 mL Intracatheter Q8H     sodium chloride (PF)  3 mL Intracatheter Q8H     spironolactone  25 mg Oral Daily     vitamin B complex with vitamin C  1 tablet Oral Daily     Vitamin D3  250 mcg Oral Daily

## 2022-03-31 NOTE — PROGRESS NOTES
Patient c/o dyspnea at rest at start of shift. Audible wheezing also noted. +3-_+4 edema in BLE. The Provider has started 40mg iv LASIX. First dose well tolerated. Patient denies SOB at rest at this time. Care team will continue to monitor.

## 2022-03-31 NOTE — PROGRESS NOTES
SPIRITUAL HEALTH SERVICES Progress Note  Hospital: [facility] Unit: Ortho/Spine    Visit Summary: Bina shared her appreciation of using me as a distraction while her blood was being drawn and it was successful. Patient shared of the limitless poke holes she has in her body and how grateful she was that she could now start her medication.    Bina shared her karri walk with me and was very alert but warned me she might get tired and end our visit.   I offered spiritual and emotional support through reflective listening that affirmed emotion, experience, and meaning.  Bina is in high hopes this medication will work and she will be able to go home soon.    Plan: [facility] is available for support.    Jess Mckeon   Intern   Pager: [phone]

## 2022-04-01 ENCOUNTER — APPOINTMENT (OUTPATIENT)
Dept: PHYSICAL THERAPY | Facility: CLINIC | Age: 77
DRG: 264 | End: 2022-04-01
Attending: SURGERY
Payer: COMMERCIAL

## 2022-04-01 LAB
ALBUMIN UR-MCNC: NEGATIVE MG/DL
ANION GAP SERPL CALCULATED.3IONS-SCNC: 4 MMOL/L (ref 3–14)
APPEARANCE UR: CLEAR
BILIRUB UR QL STRIP: NEGATIVE
BUN SERPL-MCNC: 36 MG/DL (ref 7–30)
CALCIUM SERPL-MCNC: 9.2 MG/DL (ref 8.5–10.1)
CHLORIDE BLD-SCNC: 101 MMOL/L (ref 94–109)
CO2 SERPL-SCNC: 31 MMOL/L (ref 20–32)
COLOR UR AUTO: ABNORMAL
CREAT SERPL-MCNC: 1.04 MG/DL (ref 0.52–1.04)
ERYTHROCYTE [DISTWIDTH] IN BLOOD BY AUTOMATED COUNT: 16.2 % (ref 10–15)
GFR SERPL CREATININE-BSD FRML MDRD: 56 ML/MIN/1.73M2
GLUCOSE BLD-MCNC: 104 MG/DL (ref 70–99)
GLUCOSE UR STRIP-MCNC: NEGATIVE MG/DL
HCT VFR BLD AUTO: 29.1 % (ref 35–47)
HGB BLD-MCNC: 8.5 G/DL (ref 11.7–15.7)
HGB UR QL STRIP: ABNORMAL
KETONES UR STRIP-MCNC: NEGATIVE MG/DL
LEUKOCYTE ESTERASE UR QL STRIP: ABNORMAL
MAGNESIUM SERPL-MCNC: 2.2 MG/DL (ref 1.6–2.3)
MCH RBC QN AUTO: 26 PG (ref 26.5–33)
MCHC RBC AUTO-ENTMCNC: 29.2 G/DL (ref 31.5–36.5)
MCV RBC AUTO: 89 FL (ref 78–100)
NITRATE UR QL: NEGATIVE
PH UR STRIP: 6 [PH] (ref 5–7)
PLATELET # BLD AUTO: 489 10E3/UL (ref 150–450)
POTASSIUM BLD-SCNC: 3.7 MMOL/L (ref 3.4–5.3)
RBC # BLD AUTO: 3.27 10E6/UL (ref 3.8–5.2)
RBC URINE: 15 /HPF
SODIUM SERPL-SCNC: 136 MMOL/L (ref 133–144)
SP GR UR STRIP: 1.01 (ref 1–1.03)
UROBILINOGEN UR STRIP-MCNC: NORMAL MG/DL
WBC # BLD AUTO: 12.1 10E3/UL (ref 4–11)
WBC URINE: 2 /HPF

## 2022-04-01 PROCEDURE — 250N000013 HC RX MED GY IP 250 OP 250 PS 637: Performed by: SURGERY

## 2022-04-01 PROCEDURE — 81001 URINALYSIS AUTO W/SCOPE: CPT | Performed by: HOSPITALIST

## 2022-04-01 PROCEDURE — 250N000013 HC RX MED GY IP 250 OP 250 PS 637: Performed by: HOSPITALIST

## 2022-04-01 PROCEDURE — 80048 BASIC METABOLIC PNL TOTAL CA: CPT | Performed by: HOSPITALIST

## 2022-04-01 PROCEDURE — 250N000009 HC RX 250: Performed by: SURGERY

## 2022-04-01 PROCEDURE — 250N000011 HC RX IP 250 OP 636: Performed by: HOSPITALIST

## 2022-04-01 PROCEDURE — 94640 AIRWAY INHALATION TREATMENT: CPT | Mod: 76

## 2022-04-01 PROCEDURE — 83735 ASSAY OF MAGNESIUM: CPT | Performed by: HOSPITALIST

## 2022-04-01 PROCEDURE — 120N000001 HC R&B MED SURG/OB

## 2022-04-01 PROCEDURE — 999N000157 HC STATISTIC RCP TIME EA 10 MIN

## 2022-04-01 PROCEDURE — 97116 GAIT TRAINING THERAPY: CPT | Mod: GP

## 2022-04-01 PROCEDURE — 85027 COMPLETE CBC AUTOMATED: CPT | Performed by: HOSPITALIST

## 2022-04-01 PROCEDURE — 36415 COLL VENOUS BLD VENIPUNCTURE: CPT | Performed by: HOSPITALIST

## 2022-04-01 PROCEDURE — 97530 THERAPEUTIC ACTIVITIES: CPT | Mod: GP

## 2022-04-01 PROCEDURE — 99232 SBSQ HOSP IP/OBS MODERATE 35: CPT | Performed by: HOSPITALIST

## 2022-04-01 RX ORDER — ATROPA BELLADONNA AND OPIUM 16.2; 3 MG/1; MG/1
30 SUPPOSITORY RECTAL EVERY 6 HOURS PRN
Status: DISCONTINUED | OUTPATIENT
Start: 2022-04-01 | End: 2022-04-29 | Stop reason: HOSPADM

## 2022-04-01 RX ADMIN — Medication 250 MCG: at 09:50

## 2022-04-01 RX ADMIN — HYDROMORPHONE HYDROCHLORIDE 4 MG: 2 TABLET ORAL at 09:43

## 2022-04-01 RX ADMIN — ROPINIROLE HYDROCHLORIDE 0.5 MG: 0.5 TABLET, FILM COATED ORAL at 14:12

## 2022-04-01 RX ADMIN — POTASSIUM CHLORIDE 20 MEQ: 1500 TABLET, EXTENDED RELEASE ORAL at 20:34

## 2022-04-01 RX ADMIN — FUROSEMIDE 10 MG: 20 TABLET ORAL at 16:53

## 2022-04-01 RX ADMIN — AMOXICILLIN AND CLAVULANATE POTASSIUM 1 TABLET: 875; 125 TABLET, FILM COATED ORAL at 20:34

## 2022-04-01 RX ADMIN — GABAPENTIN 300 MG: 300 CAPSULE ORAL at 22:17

## 2022-04-01 RX ADMIN — IPRATROPIUM BROMIDE AND ALBUTEROL SULFATE 3 ML: .5; 3 SOLUTION RESPIRATORY (INHALATION) at 08:07

## 2022-04-01 RX ADMIN — FERROUS SULFATE TAB 325 MG (65 MG ELEMENTAL FE) 325 MG: 325 (65 FE) TAB at 09:50

## 2022-04-01 RX ADMIN — Medication 1 PACKET: at 10:11

## 2022-04-01 RX ADMIN — ROPINIROLE HYDROCHLORIDE 1 MG: 0.5 TABLET, FILM COATED ORAL at 22:17

## 2022-04-01 RX ADMIN — HYDROMORPHONE HYDROCHLORIDE 4 MG: 2 TABLET ORAL at 22:17

## 2022-04-01 RX ADMIN — B-COMPLEX W/ C & FOLIC ACID TAB 1 TABLET: TAB at 09:50

## 2022-04-01 RX ADMIN — PREGABALIN 100 MG: 100 CAPSULE ORAL at 20:34

## 2022-04-01 RX ADMIN — GABAPENTIN 300 MG: 300 CAPSULE ORAL at 16:53

## 2022-04-01 RX ADMIN — SIMVASTATIN 40 MG: 40 TABLET, FILM COATED ORAL at 22:17

## 2022-04-01 RX ADMIN — POTASSIUM CHLORIDE 40 MEQ: 1500 TABLET, EXTENDED RELEASE ORAL at 09:50

## 2022-04-01 RX ADMIN — PREGABALIN 100 MG: 100 CAPSULE ORAL at 09:51

## 2022-04-01 RX ADMIN — HYDROMORPHONE HYDROCHLORIDE 4 MG: 2 TABLET ORAL at 18:05

## 2022-04-01 RX ADMIN — FUROSEMIDE 40 MG: 10 INJECTION, SOLUTION INTRAVENOUS at 09:51

## 2022-04-01 RX ADMIN — BUPROPION HYDROCHLORIDE 150 MG: 150 TABLET, EXTENDED RELEASE ORAL at 09:50

## 2022-04-01 RX ADMIN — CITALOPRAM HYDROBROMIDE 40 MG: 20 TABLET, FILM COATED ORAL at 09:50

## 2022-04-01 RX ADMIN — Medication 1 CAPSULE: at 10:26

## 2022-04-01 RX ADMIN — IPRATROPIUM BROMIDE AND ALBUTEROL SULFATE 3 ML: .5; 3 SOLUTION RESPIRATORY (INHALATION) at 23:49

## 2022-04-01 RX ADMIN — ROPINIROLE HYDROCHLORIDE 0.5 MG: 0.5 TABLET, FILM COATED ORAL at 09:51

## 2022-04-01 RX ADMIN — Medication 1 PACKET: at 20:33

## 2022-04-01 RX ADMIN — AMOXICILLIN AND CLAVULANATE POTASSIUM 1 TABLET: 875; 125 TABLET, FILM COATED ORAL at 09:48

## 2022-04-01 RX ADMIN — CYANOCOBALAMIN TAB 1000 MCG 1000 MCG: 1000 TAB at 09:51

## 2022-04-01 RX ADMIN — SENNOSIDES AND DOCUSATE SODIUM 1 TABLET: 50; 8.6 TABLET ORAL at 20:34

## 2022-04-01 RX ADMIN — IPRATROPIUM BROMIDE AND ALBUTEROL SULFATE 3 ML: .5; 3 SOLUTION RESPIRATORY (INHALATION) at 16:22

## 2022-04-01 RX ADMIN — PANTOPRAZOLE SODIUM 40 MG: 40 TABLET, DELAYED RELEASE ORAL at 09:49

## 2022-04-01 RX ADMIN — GABAPENTIN 300 MG: 300 CAPSULE ORAL at 09:51

## 2022-04-01 RX ADMIN — MULTIPLE VITAMINS W/ MINERALS TAB 1 TABLET: TAB at 09:51

## 2022-04-01 RX ADMIN — SPIRONOLACTONE 25 MG: 25 TABLET ORAL at 09:50

## 2022-04-01 RX ADMIN — IPRATROPIUM BROMIDE AND ALBUTEROL SULFATE 3 ML: .5; 3 SOLUTION RESPIRATORY (INHALATION) at 19:50

## 2022-04-01 RX ADMIN — ASPIRIN 81 MG CHEWABLE TABLET 81 MG: 81 TABLET CHEWABLE at 20:32

## 2022-04-01 RX ADMIN — Medication 1 CAPSULE: at 22:26

## 2022-04-01 ASSESSMENT — ACTIVITIES OF DAILY LIVING (ADL)
ADLS_ACUITY_SCORE: 9
ADLS_ACUITY_SCORE: 11
ADLS_ACUITY_SCORE: 9
ADLS_ACUITY_SCORE: 11
ADLS_ACUITY_SCORE: 9
ADLS_ACUITY_SCORE: 11
ADLS_ACUITY_SCORE: 9
ADLS_ACUITY_SCORE: 11
DEPENDENT_IADLS:: TRANSPORTATION
ADLS_ACUITY_SCORE: 9
ADLS_ACUITY_SCORE: 11
ADLS_ACUITY_SCORE: 9
ADLS_ACUITY_SCORE: 11
ADLS_ACUITY_SCORE: 11
ADLS_ACUITY_SCORE: 9
ADLS_ACUITY_SCORE: 9

## 2022-04-01 NOTE — CONSULTS
Care Management Initial Consult    General Information  Assessment completed with: Patient, Carolyn  Type of CM/SW Visit: Initial Assessment  -Met with Carolyn and patient. She is refusing at this time to go to TCU. Patient understands that might need to happen if Home Care is unable to accept or needing it after her skin graft. Patient really wants to go home  Primary Care Provider verified and updated as needed:     Readmission within the last 30 days: no previous admission in last 30 days      Reason for Consult: discharge planning  Advance Care Planning: Advance Care Planning Reviewed: questions answered          Communication Assessment  Patient's communication style: spoken language (English or Bilingual)    Hearing Difficulty or Deaf: no   Wear Glasses or Blind: yes    Cognitive  Cognitive/Neuro/Behavioral: WDL  Level of Consciousness: alert  Arousal Level: opens eyes spontaneously  Orientation: oriented x 4  Mood/Behavior: calm, cooperative  Best Language: 0 - No aphasia  Speech: clear    Living Environment:   People in home: significant other  Jaleel  Current living Arrangements: house      Able to return to prior arrangements: yes       Family/Social Support:  Care provided by: self, spouse/significant other  Provides care for: no one, unable/limited ability to care for self  Marital Status: Lives with Significant Other  Significant Other, Children          Description of Support System:  Supportive, SO works full time, daughters are helpful.         Current Resources:   Patient receiving home care services: No     Community Resources: None  Equipment currently used at home: cane, straight  Supplies currently used at home: wound care, incontinent supplies.    Employment/Financial:  Employment Status: employed full-time-is currently on a Leave from work related to her health issues.       Financial Concerns: other (see comments)           Lifestyle & Psychosocial Needs:  Social Determinants of Health     Tobacco  Use: Medium Risk     Smoking Tobacco Use: Former Smoker     Smokeless Tobacco Use: Never Used   Alcohol Use: Not on file   Financial Resource Strain: Not on file   Food Insecurity: Not on file   Transportation Needs: Not on file   Physical Activity: Not on file   Stress: Not on file   Social Connections: Not on file   Intimate Partner Violence: Not on file   Depression: Not on file   Housing Stability: Not on file       Functional Status:  Prior to admission patient needed assistance:   Dependent ADLs:: Ambulation-walker  Dependent IADLs:: Transportation  Assesssment of Functional Status: Not at  functional baseline    Mental Health Status:  Mental Health Status: No Current Concerns       Chemical Dependency Status:      unknown          Values/Beliefs:  Spiritual, Cultural Beliefs, Orthodox Practices, Values that affect care: yes  Description of Beliefs that Will Affect Care: request a  to stop by            Additional Information:  Writer and daughter went over options for patient. Patient does not want to TCU. Writer and daughter, Carolyn explained how close [place] was and would be close to hospital. Patient wanted writer to try [facility] [facility] before a TCU stay. Patient has had [facility] in the past.Writer sent referral via e-mail.  Loraine Reynolds RN

## 2022-04-01 NOTE — PROGRESS NOTES
Shift Summary [id number]    Admitting Diagnosis: Chronic ulcer of right leg, with fat layer exposed (H) [L97.912]  Pain associated with wound [T14.8XXA, R52]  Ulcer of right lower extremity with fat layer exposed (H) [L97.912]   Vitals : stable.   Pain 8/10. Taking Oxycodone  PRN.  A&Ox4  Voiding : indwelling Amato catheter. IV lasix given today for fluid overload.    Mobility : heavy assist of 2.   Tele : NSR.   CMS : LYMPHEDEMA IN ble.   Lung Sounds CLEAR anterior lobes on  Room air (occassional audible wheezing noted. ) via   GI : nobm noted during shift.   Dressing : wound team from Dr. Bullock's clinic was in to change dressing on the RLE. See notes please.     Orders Placed This Encounter      Advance Diet as Tolerated: Regular Diet Adult       Plan:

## 2022-04-01 NOTE — PROGRESS NOTES
VSS on RA. A&Ox4. Ax1, GB, walker for transfers. Dressing to RLE CDI, edema wear stocking to LLE. Denies numbness and tingling. Endorsing burning sensation to RLE at times? PRN dilaudid and tylenol given for pain, ice pack applied. Amato in place. Possible discharge home w home care, dressing to either be changed in hospital today by WOC or in clinic if discharged. Pt on IV lasix - endorsing SOB at times, no wheezes noted.

## 2022-04-01 NOTE — PROGRESS NOTES
Care Management Follow Up    Length of Stay (days): 10    Expected Discharge Date: 04/02/2022     Concerns to be Addressed:       Patient plan of care discussed at interdisciplinary rounds: Yes    Anticipated Discharge Disposition: Transitional Care     Anticipated Discharge Services: None  Anticipated Discharge DME:      Patient/family educated on Medicare website which has current facility and service quality ratings:  N/A  Education Provided on the Discharge Plan:  N/A  Patient/Family in Agreement with the Plan: yes    Referrals Placed by CM/SW:  TCU  Private pay costs discussed: Not applicable    Additional Information:  SW informed by physical therapy that patient has now decided TCU and would like referrals sent to [facility], and [facility]. SW sent  Referrals. SW will continue to follow.      JACKY Seth    [facility]

## 2022-04-01 NOTE — PROGRESS NOTES
[facility]    Medicine Progress Note - Hospitalist Service    Date of Admission:  3/21/2022    Assessment & Plan        Elziabeth Kelley is a 76 year old female with PMHx MS, chronic BLE lymphedema with venous insufficiency and chronic R leg cellulitis, GERD, hyperlipidemia, and depression who underwent previously scheduled excisional debridement of RLE cicumferential venous hypertensive ulceration and application of wound vac on 3/21/22. Hospitalist service re-consulted 3/23/22 for decreased responsiveness, meeting sepsis criteria with suspected pneumonia as source. She was started on empiric treatment with improvement in her level of unreponsiveness and resolution of her hypoxemia.      Sepsis secondary to CAP vs aspiration pneumonia  Acute hypoxic respiratory failure secondary to above, improved  Hx of MRSA  Tmax 101.5, tachycardic in the 110s, hypoxic to 86% on room air. WBC 18.3, procal 0.17. Lactic 1.3. UA with trace LE, WBC 7. . VBG 7.33/54/57/28. CXR with patchy airspace opacities in the left mid and lower lung, suspicious for pneumonia.  Highest suspicion for pneumonia given the above, though note hx of recurrent RLE cellulitis with procedure as above. Hx of MRSA. RLE with wound vac in place.  Was volume resusciated and started on antibiotics including zosyn and vancomycin, and since narrowed to zosyn.  Blood cultures- NGTD.  Repeat CXR on 3/28 notable for new right upper lobe infiltrate and basilar infiltrate lateral views.  Did receive some diuretic for the volume overload.    RCAT consulted, encourage pulmonary toilet with IS and acapella.    Duonebs q4 hrs while awake, PRN albuterol nebs.    Monitor fluid status.    Resumed PTA Lasix and Aldactone.     Completed 7 day course of zosyn on 3/30/22. Now on oral augmentin for lower extremity cellulitis, will continue to complete 14 day course.    Robitussin prn.    On RA, will ask PT to ambulate.    SLP consulted - no evidence  of dysphagia.     Encephalopathy, multifactorial- resolved  Acute hypoxic and hypercapnic respiratory failure- multifactorial  Altered mentation noted by nursing during hosptialization. Multifactorial in the setting of fever, narcotic pain medications, suspected underlying BOLIVAR, possible dehydration. Received dose of narcan, but did not significantly change pts mentation immediately, though seemed to improve within the hour after IVF bolus was near completion. Mental status continues to improved.    Continue expectant management.    Judicious pain med use.    Bipap prn.    Suspect underlying BOLIVAR, urged her to get evaluated as outpatient.    Resumed PTA Wellbutrin, Lyrica and Requip as per the patient's request but will closely monitor mentation, especially if she will get pain medications; low threshold to hold these meds if she becomes confused/lethargic.    3/31/22 - more short of breath today, but not hypoxic. Improved after diuresis.     Acute blood loss anemia  Chronic normocytic anemia  Baseline 10-11. Down to 8.3 post-procedure. No active bleeding. Could be dilutional as checked while receiving IVF bolus   3/24 - Hb down to 6.1 but repeated Hb 7.1, again down to 6.4. Transfused 1 unit PRBCs - 3/25/22. Transfused another unit PRBC on 3/27/22 for hemoglobin 6.7.    Discontinued scheduled Celebrex.    Iron studies suggestive of iron deficiency, started on oral iron supplement.    Hemoglobin slowly trending up, 8.5 on 4/1/22.     Hematuria  Noted in AM on 3/30/22. Creatinine stable at 1/14. Monitor for now. Already on Zosyn, so low suspicion for new UTI. Could be related to trauma/irritation from Amato catheter.    Resolved as of 3/31/22.    PRN B&O suppository ordered for bladder spasms. Will check UA, but low suspicion for UTI.    Remove Amato catheter today.    Chronic BLE lymphedema with venous insufficiency and chronic R leg cellulitis   S/p excisional debridement of RLE cicumferential venous hypertensive  "ulceration and application of wound vac (3/21/22)  S/p intraoperative wound examination, wound debridement, and application of myriad 2 layer graft on 3/28  Defer routine post-operative cares to Dr. Bullock.    Judicious pain control.    Gabapentin 300 mg po TID.    Also has Toradol available q6h prn (monitor Hb).    Lyrica 100 bid.    Defer wound cares to Dr. Bullock. Dressing changed in the hospital on 3/31/22.    Continue PTA lasix, aldactone, and potassium supplement.    More edematous and short of breath on 3/31/22. Improved after giving 3 doses of IV lasix. Will transition back to oral lasix on 4/1/22.    Severe Obesity  Estimated body mass index is 50.81 kg/m  as calculated from the following:    Height as of this encounter: 1.549 m (5' 1\").    Weight as of this encounter: 122 kg (268 lb 14.4 oz).     Follow up with PCP.      MS    Ambulatory at baseline, though some weakness in lower extremities and intermittent upper extremity weakness.     Hyponatremia, mild, resolved    Sodium 138 on 3/29/22.    GERD    Continue PTA Protonix.    Hyperlipidemia    Continue PTA simvastatin.    Depression    Resumed PTA Celexa, Wellbutrin and Lyrica on 3/26/22. Had been held for a few days ago given AMS, resumed when mentation back to baseline.    RLS    Continue PTA Requip.       Diet: Advance Diet as Tolerated: Regular Diet Adult    DVT Prophylaxis: Pneumatic Compression Devices  Amato Catheter: PRESENT, indication: Retention  Central Lines: None  Cardiac Monitoring: ACTIVE order. Indication: hypoxic hypercapneic resp failure  Code Status: Full Code      Disposition Plan   Expected Discharge: 04/02/2022 possible discharge home with family assistance tomorrow pending improvement in symptoms.  Anticipated discharge location: home with family    Delays:     Other (Add Comment)            The patient's care was discussed with the Bedside Nurse, Patient and Patient's Family.    Hiren Antonio MD  Hospitalist [facility] " [facility]  Securely message with the [employer] Web Console (learn more here)  Text page via [employer] Paging/Directory         Clinically Significant Risk Factors Present on Admission                    ______________________________________________________________________    Interval History   Elizabeth Kelley was seen this morning. Complains of bladder discomfort. Some pain in her right leg. Dressing was changed by Dr. Bullock's staff yesterday. Shortness of breath improved. Denies fevers, chest pain, nausea, abdominal pain. No weight documented this AM. Daughter was in the room with her, discussed plan of care.    Data reviewed today: I reviewed all medications, new labs and imaging results over the last 24 hours. I personally reviewed no images or EKG's today.    Physical Exam   Vital Signs: Temp: 98.9  F (37.2  C) Temp src: Oral BP: 132/61 Pulse: 83   Resp: 18 SpO2: 94 % O2 Device: None (Room air)    Weight: 289 lbs 7.42 oz  Constitutional: awake, alert, cooperative, appears uncomfortable, sitting the recliner  Respiratory: clear to auscultation bilaterally, no crackles or wheezing  Cardiovascular: regular rate and rhythm, normal S1 and S2, no murmur noted  GI: normal bowel sounds, soft, obese, non-distended, non-tender  : Amato catheter in place with yellow urine in the bag  Skin: warm, dry, dressing in place on right lower extremity  Musculoskeletal: 2-3+ bilateral lower extremity pitting edema present  Neurologic: awake, alert, answers questions appropriately, motor is 5 out of 5 bilaterally, sensory is intact    Data   Recent Labs   Lab 04/01/22  0650 03/31/22  0624 03/30/22  0640 03/30/22  0614 03/29/22  2147 03/29/22  0635   WBC 12.1* 11.0  --   --   --  10.7   HGB 8.5* 7.7*  --   --   --  7.4*   MCV 89 88  --   --   --  87   * 412  --   --   --  372    136  --   --   --  138   POTASSIUM 3.7 4.2  --   --   --  4.0   CHLORIDE 101 104  --   --   --  104   CO2 31 27  --   --   --  31    BUN 36* 32*  --   --   --  23   CR 1.04 0.99 1.14*  --   --  0.98   ANIONGAP 4 5  --   --   --  3   JUNI 9.2 9.0  --   --   --  9.0   * 94  --  98   < > 120*    < > = values in this interval not displayed.     Medications     - MEDICATION INSTRUCTIONS -       - MEDICATION INSTRUCTIONS -         amoxicillin-clavulanate  1 tablet Oral Q12H ALYSIA     aspirin  81 mg Oral QPM     buPROPion  150 mg Oral QAM     citalopram  40 mg Oral QAM     cyanocobalamin  1,000 mcg Oral Daily     diosmin-hesperidin 450-50  1 capsule Oral BID     [Held by provider] enoxaparin ANTICOAGULANT  40 mg Subcutaneous Q24H     ferrous sulfate  325 mg Oral Daily     furosemide  10 mg Oral BID     gabapentin  300 mg Oral TID     ipratropium - albuterol 0.5 mg/2.5 mg/3 mL  3 mL Nebulization Q4H While awake     Aleksandr  1 packet Oral BID     multivitamin w/minerals  1 tablet Oral Daily     pantoprazole  40 mg Oral Daily     polyethylene glycol  17 g Oral Daily     potassium chloride ER  20 mEq Oral QPM     potassium chloride ER  40 mEq Oral QAM     pregabalin  100 mg Oral BID     rOPINIRole  0.5 mg Oral BID     rOPINIRole  1 mg Oral At Bedtime     senna-docusate  1 tablet Oral BID     simvastatin  40 mg Oral At Bedtime     sodium chloride (PF)  3 mL Intracatheter Q8H     spironolactone  25 mg Oral Daily     vitamin B complex with vitamin C  1 tablet Oral Daily     Vitamin D3  250 mcg Oral Daily

## 2022-04-02 ENCOUNTER — APPOINTMENT (OUTPATIENT)
Dept: PHYSICAL THERAPY | Facility: CLINIC | Age: 77
DRG: 264 | End: 2022-04-02
Attending: SURGERY
Payer: COMMERCIAL

## 2022-04-02 ENCOUNTER — APPOINTMENT (OUTPATIENT)
Dept: GENERAL RADIOLOGY | Facility: CLINIC | Age: 77
DRG: 264 | End: 2022-04-02
Attending: HOSPITALIST
Payer: COMMERCIAL

## 2022-04-02 LAB
ANION GAP SERPL CALCULATED.3IONS-SCNC: 3 MMOL/L (ref 3–14)
BUN SERPL-MCNC: 34 MG/DL (ref 7–30)
CALCIUM SERPL-MCNC: 9.3 MG/DL (ref 8.5–10.1)
CHLORIDE BLD-SCNC: 100 MMOL/L (ref 94–109)
CO2 SERPL-SCNC: 32 MMOL/L (ref 20–32)
CREAT SERPL-MCNC: 0.97 MG/DL (ref 0.52–1.04)
ERYTHROCYTE [DISTWIDTH] IN BLOOD BY AUTOMATED COUNT: 16 % (ref 10–15)
GFR SERPL CREATININE-BSD FRML MDRD: 61 ML/MIN/1.73M2
GLUCOSE BLD-MCNC: 128 MG/DL (ref 70–99)
HCT VFR BLD AUTO: 29.4 % (ref 35–47)
HGB BLD-MCNC: 8.6 G/DL (ref 11.7–15.7)
LACTATE SERPL-SCNC: 1.2 MMOL/L (ref 0.7–2)
MCH RBC QN AUTO: 26.1 PG (ref 26.5–33)
MCHC RBC AUTO-ENTMCNC: 29.3 G/DL (ref 31.5–36.5)
MCV RBC AUTO: 89 FL (ref 78–100)
PLATELET # BLD AUTO: 489 10E3/UL (ref 150–450)
POTASSIUM BLD-SCNC: 4 MMOL/L (ref 3.4–5.3)
PROCALCITONIN SERPL-MCNC: 0.1 NG/ML
RBC # BLD AUTO: 3.29 10E6/UL (ref 3.8–5.2)
SODIUM SERPL-SCNC: 135 MMOL/L (ref 133–144)
WBC # BLD AUTO: 20 10E3/UL (ref 4–11)

## 2022-04-02 PROCEDURE — 97530 THERAPEUTIC ACTIVITIES: CPT | Mod: GP

## 2022-04-02 PROCEDURE — 999N000157 HC STATISTIC RCP TIME EA 10 MIN

## 2022-04-02 PROCEDURE — 250N000013 HC RX MED GY IP 250 OP 250 PS 637: Performed by: SURGERY

## 2022-04-02 PROCEDURE — 120N000001 HC R&B MED SURG/OB

## 2022-04-02 PROCEDURE — 84145 PROCALCITONIN (PCT): CPT | Performed by: HOSPITALIST

## 2022-04-02 PROCEDURE — 71045 X-RAY EXAM CHEST 1 VIEW: CPT

## 2022-04-02 PROCEDURE — 99233 SBSQ HOSP IP/OBS HIGH 50: CPT | Performed by: HOSPITALIST

## 2022-04-02 PROCEDURE — 83605 ASSAY OF LACTIC ACID: CPT | Performed by: HOSPITALIST

## 2022-04-02 PROCEDURE — 85027 COMPLETE CBC AUTOMATED: CPT | Performed by: HOSPITALIST

## 2022-04-02 PROCEDURE — 80048 BASIC METABOLIC PNL TOTAL CA: CPT | Performed by: HOSPITALIST

## 2022-04-02 PROCEDURE — 97116 GAIT TRAINING THERAPY: CPT | Mod: GP

## 2022-04-02 PROCEDURE — 94640 AIRWAY INHALATION TREATMENT: CPT | Mod: 76

## 2022-04-02 PROCEDURE — 99231 SBSQ HOSP IP/OBS SF/LOW 25: CPT | Performed by: SURGERY

## 2022-04-02 PROCEDURE — 250N000009 HC RX 250: Performed by: SURGERY

## 2022-04-02 PROCEDURE — 999N000040 HC STATISTIC CONSULT NO CHARGE VASC ACCESS

## 2022-04-02 PROCEDURE — 250N000011 HC RX IP 250 OP 636: Performed by: HOSPITALIST

## 2022-04-02 PROCEDURE — 36415 COLL VENOUS BLD VENIPUNCTURE: CPT | Performed by: HOSPITALIST

## 2022-04-02 PROCEDURE — 250N000013 HC RX MED GY IP 250 OP 250 PS 637: Performed by: HOSPITALIST

## 2022-04-02 RX ORDER — CEFTRIAXONE 1 G/1
1 INJECTION, POWDER, FOR SOLUTION INTRAMUSCULAR; INTRAVENOUS EVERY 24 HOURS
Status: DISCONTINUED | OUTPATIENT
Start: 2022-04-02 | End: 2022-04-03

## 2022-04-02 RX ORDER — FUROSEMIDE 10 MG/ML
40 INJECTION INTRAMUSCULAR; INTRAVENOUS
Status: DISCONTINUED | OUTPATIENT
Start: 2022-04-02 | End: 2022-04-03

## 2022-04-02 RX ORDER — PREGABALIN 50 MG/1
50 CAPSULE ORAL 2 TIMES DAILY
Status: DISCONTINUED | OUTPATIENT
Start: 2022-04-02 | End: 2022-04-06

## 2022-04-02 RX ADMIN — ASPIRIN 81 MG CHEWABLE TABLET 81 MG: 81 TABLET CHEWABLE at 19:45

## 2022-04-02 RX ADMIN — CYANOCOBALAMIN TAB 1000 MCG 1000 MCG: 1000 TAB at 08:48

## 2022-04-02 RX ADMIN — SIMVASTATIN 40 MG: 40 TABLET, FILM COATED ORAL at 21:35

## 2022-04-02 RX ADMIN — PANTOPRAZOLE SODIUM 40 MG: 40 TABLET, DELAYED RELEASE ORAL at 08:48

## 2022-04-02 RX ADMIN — IPRATROPIUM BROMIDE AND ALBUTEROL SULFATE 3 ML: .5; 3 SOLUTION RESPIRATORY (INHALATION) at 15:35

## 2022-04-02 RX ADMIN — GUAIFENESIN 10 ML: 100 SOLUTION ORAL at 06:29

## 2022-04-02 RX ADMIN — FUROSEMIDE 10 MG: 20 TABLET ORAL at 08:04

## 2022-04-02 RX ADMIN — GUAIFENESIN 10 ML: 100 SOLUTION ORAL at 21:36

## 2022-04-02 RX ADMIN — ROPINIROLE HYDROCHLORIDE 1 MG: 0.5 TABLET, FILM COATED ORAL at 21:36

## 2022-04-02 RX ADMIN — ACETAMINOPHEN 650 MG: 325 TABLET, FILM COATED ORAL at 19:46

## 2022-04-02 RX ADMIN — IPRATROPIUM BROMIDE AND ALBUTEROL SULFATE 3 ML: .5; 3 SOLUTION RESPIRATORY (INHALATION) at 11:28

## 2022-04-02 RX ADMIN — IPRATROPIUM BROMIDE AND ALBUTEROL SULFATE 3 ML: .5; 3 SOLUTION RESPIRATORY (INHALATION) at 19:33

## 2022-04-02 RX ADMIN — ACETAMINOPHEN 650 MG: 325 TABLET, FILM COATED ORAL at 08:48

## 2022-04-02 RX ADMIN — Medication 1 PACKET: at 21:38

## 2022-04-02 RX ADMIN — IPRATROPIUM BROMIDE AND ALBUTEROL SULFATE 3 ML: .5; 3 SOLUTION RESPIRATORY (INHALATION) at 07:19

## 2022-04-02 RX ADMIN — MULTIPLE VITAMINS W/ MINERALS TAB 1 TABLET: TAB at 08:48

## 2022-04-02 RX ADMIN — Medication 1 CAPSULE: at 11:10

## 2022-04-02 RX ADMIN — Medication 250 MCG: at 08:47

## 2022-04-02 RX ADMIN — GABAPENTIN 300 MG: 300 CAPSULE ORAL at 21:36

## 2022-04-02 RX ADMIN — SPIRONOLACTONE 25 MG: 25 TABLET ORAL at 08:47

## 2022-04-02 RX ADMIN — ROPINIROLE HYDROCHLORIDE 0.5 MG: 0.5 TABLET, FILM COATED ORAL at 08:47

## 2022-04-02 RX ADMIN — FERROUS SULFATE TAB 325 MG (65 MG ELEMENTAL FE) 325 MG: 325 (65 FE) TAB at 08:47

## 2022-04-02 RX ADMIN — BUPROPION HYDROCHLORIDE 150 MG: 150 TABLET, EXTENDED RELEASE ORAL at 08:47

## 2022-04-02 RX ADMIN — SENNOSIDES AND DOCUSATE SODIUM 1 TABLET: 50; 8.6 TABLET ORAL at 21:36

## 2022-04-02 RX ADMIN — FUROSEMIDE 40 MG: 10 INJECTION, SOLUTION INTRAVENOUS at 12:17

## 2022-04-02 RX ADMIN — GABAPENTIN 300 MG: 300 CAPSULE ORAL at 08:48

## 2022-04-02 RX ADMIN — FUROSEMIDE 40 MG: 10 INJECTION, SOLUTION INTRAVENOUS at 17:19

## 2022-04-02 RX ADMIN — Medication 1 CAPSULE: at 22:52

## 2022-04-02 RX ADMIN — IPRATROPIUM BROMIDE AND ALBUTEROL SULFATE 3 ML: .5; 3 SOLUTION RESPIRATORY (INHALATION) at 23:30

## 2022-04-02 RX ADMIN — B-COMPLEX W/ C & FOLIC ACID TAB 1 TABLET: TAB at 08:47

## 2022-04-02 RX ADMIN — CEFTRIAXONE SODIUM 1 G: 1 INJECTION, POWDER, FOR SOLUTION INTRAMUSCULAR; INTRAVENOUS at 12:30

## 2022-04-02 RX ADMIN — GABAPENTIN 300 MG: 300 CAPSULE ORAL at 17:19

## 2022-04-02 RX ADMIN — ROPINIROLE HYDROCHLORIDE 0.5 MG: 0.5 TABLET, FILM COATED ORAL at 12:18

## 2022-04-02 RX ADMIN — AMOXICILLIN AND CLAVULANATE POTASSIUM 1 TABLET: 875; 125 TABLET, FILM COATED ORAL at 08:47

## 2022-04-02 RX ADMIN — GUAIFENESIN 10 ML: 100 SOLUTION ORAL at 02:43

## 2022-04-02 RX ADMIN — IPRATROPIUM BROMIDE AND ALBUTEROL SULFATE 3 ML: .5; 3 SOLUTION RESPIRATORY (INHALATION) at 03:54

## 2022-04-02 RX ADMIN — POTASSIUM CHLORIDE 40 MEQ: 1500 TABLET, EXTENDED RELEASE ORAL at 08:47

## 2022-04-02 RX ADMIN — PREGABALIN 50 MG: 50 CAPSULE ORAL at 21:36

## 2022-04-02 RX ADMIN — CITALOPRAM HYDROBROMIDE 40 MG: 20 TABLET, FILM COATED ORAL at 08:48

## 2022-04-02 RX ADMIN — Medication 1 PACKET: at 11:17

## 2022-04-02 RX ADMIN — HYDROMORPHONE HYDROCHLORIDE 4 MG: 2 TABLET ORAL at 21:36

## 2022-04-02 RX ADMIN — PREGABALIN 100 MG: 100 CAPSULE ORAL at 08:48

## 2022-04-02 RX ADMIN — POTASSIUM CHLORIDE 20 MEQ: 1500 TABLET, EXTENDED RELEASE ORAL at 19:46

## 2022-04-02 ASSESSMENT — ACTIVITIES OF DAILY LIVING (ADL)
ADLS_ACUITY_SCORE: 13
ADLS_ACUITY_SCORE: 11
ADLS_ACUITY_SCORE: 13
ADLS_ACUITY_SCORE: 11
ADLS_ACUITY_SCORE: 13
ADLS_ACUITY_SCORE: 11
ADLS_ACUITY_SCORE: 13
ADLS_ACUITY_SCORE: 13
ADLS_ACUITY_SCORE: 11
ADLS_ACUITY_SCORE: 13
ADLS_ACUITY_SCORE: 11
ADLS_ACUITY_SCORE: 11

## 2022-04-02 NOTE — PROGRESS NOTES
Shift Summary [id number]    Admitting Diagnosis: Chronic ulcer of right leg, with fat layer exposed (H) [L97.912]  Pain associated with wound [T14.8XXA, R52]  Ulcer of right lower extremity with fat layer exposed (H) [L97.912]   Vitals : stable.   Pain 8/10. Taking Dilaudid 2 to 4 mg PRN.   A&Ox4  Voiding : Amato dced this afternoon. Patient is voiding okay with assist of PURWICK. No evidence of urinary retention. UA rdered due to during and spasms in groin/bladder.  Please see UA result.   Mobility Ax 1 with gait belt walker.   Tele : NSR.   CMS :Lymphedema in BLE.   Lung Sounds clear in anterior upper lobes.  on room air.  O 2 sats above 92%.   GI : 1 BM today.   Dressing : dressing change performed by Dr. Bullock's team yesterday. Dressing must remain in place.     Orders Placed This Encounter      Advance Diet as Tolerated: Regular Diet Adult   IV lasix was discontinued today. Patient is now back on oral Lasix 10mg.     Plan: Patient to discharge home as soon as current medical condition (fluid overload and urinalysis ) is resolved.

## 2022-04-02 NOTE — PROGRESS NOTES
Spoke to MD and family about Midline order. Pt has a good working PIV that was placed on 3/31 on her left AC. Bedside RN also reports he has not heard of any issues with obtaining blood from pt for labs. Family and MD okay to hold of on Midline for now. PICC/IV team will sign off. Please feel free to consult again if needed.

## 2022-04-02 NOTE — PROGRESS NOTES
WOUND CARE    Visited with patient today.  Dressings were changed on 3/31/2021 by wound care nursing.  Dynamic compression wrap applied at that time and plan follow-up at wound clinic next week for wound care dressing change    Patient with fever this morning.  Being evaluated by hospitalist team.    Discussed with patient         Quan Floyd MD

## 2022-04-02 NOTE — PROGRESS NOTES
Sepsis protocol in place. Labs ordered. Patient is alert and oriented X4. Denies chest pain. Shortness of breath on exertion. 2 liters O2. Provider notified.

## 2022-04-02 NOTE — PROGRESS NOTES
[facility]    Medicine Progress Note - Hospitalist Service    Date of Admission:  3/21/2022    Assessment & Plan        Elizabeth Kelley is a 76 year old female with PMHx MS, chronic BLE lymphedema with venous insufficiency and chronic R leg cellulitis, GERD, hyperlipidemia, and depression who underwent previously scheduled excisional debridement of RLE cicumferential venous hypertensive ulceration and application of wound vac on 3/21/22. Hospitalist service re-consulted 3/23/22 for decreased responsiveness, meeting sepsis criteria with suspected pneumonia as source. She was started on empiric treatment with improvement in her level of unreponsiveness and resolution of her hypoxemia.      Sepsis secondary to CAP vs aspiration pneumonia  Acute hypoxic respiratory failure secondary to above, improved initially, but worse again now  Hx of MRSA  Tmax 101.5, tachycardic in the 110s, hypoxic to 86% on room air. WBC 18.3, procal 0.17. Lactic 1.3. UA with trace LE, WBC 7. . VBG 7.33/54/57/28. CXR with patchy airspace opacities in the left mid and lower lung, suspicious for pneumonia.  Highest suspicion for pneumonia given the above, though note hx of recurrent RLE cellulitis with procedure as above. Hx of MRSA. RLE with wound vac in place.  Was volume resusciated and started on antibiotics including zosyn and vancomycin, and since narrowed to zosyn.  Blood cultures- NGTD.  Repeat CXR on 3/28 notable for new right upper lobe infiltrate and basilar infiltrate lateral views.  Did receive some diuretic for the volume overload.    RCAT consulted, encourage pulmonary toilet with IS and acapella.    Duonebs q4 hrs while awake, PRN albuterol nebs.    Monitor fluid status.    Resumed PTA Lasix and Aldactone.     Completed 7 day course of zosyn on 3/30/22. Then on oral augmentin for lower extremity cellulitis with plan to complete 14 day total course of antibiotics, transitioned to rocephin on 4/2/22  as noted below.    Robitussin prn.    On RA, will ask PT to ambulate.    SLP consulted - no evidence of dysphagia.    3/31/22 - more short of breath today, but not hypoxic. Improved after diuresis, however symptoms worse again on 4/2/22.    CXR pending.    Will change antibiotics to IV rocephin for now to cover possible pneumonia (hypoxia, shortness of breath, cough, low grade fever, leukocytosis).    Will diurese with IV lasix as noted below.     Encephalopathy, multifactorial- resolved  Altered mentation noted by nursing during hosptialization. Multifactorial in the setting of fever, narcotic pain medications, suspected underlying BOLIVAR, possible dehydration. Received dose of narcan, but did not significantly change pts mentation immediately, though seemed to improve within the hour after IVF bolus was near completion. Mental status continues to improved.    Continue expectant management.    Judicious pain med use.    Bipap prn.    Suspect underlying BOLIVAR, urged her to get evaluated as outpatient.    Resumed PTA Wellbutrin, Lyrica and Requip as per the patient's request but will closely monitor mentation, especially if she will get pain medications; low threshold to hold these meds if she becomes confused/lethargic.    Will decrease PTA lyrica due to developing mental status changes, may need to hold other meds as well pending clinical course.     Acute blood loss anemia  Chronic normocytic anemia  Baseline 10-11. Down to 8.3 post-procedure. No active bleeding. Could be dilutional as checked while receiving IVF bolus   3/24 - Hb down to 6.1 but repeated Hb 7.1, again down to 6.4. Transfused 1 unit PRBCs - 3/25/22. Transfused another unit PRBC on 3/27/22 for hemoglobin 6.7.    Discontinued scheduled Celebrex.    Iron studies suggestive of iron deficiency, started on oral iron supplement.    Hemoglobin slowly trending up, 8.6 on 4/2/22.     Hematuria  Noted in AM on 3/30/22. Creatinine stable at 1/14. Monitor for now.  "Already on Zosyn, so low suspicion for new UTI. Could be related to trauma/irritation from Amato catheter.    Resolved as of 3/31/22.    PRN B&O suppository ordered for bladder spasms. Will check UA, but low suspicion for UTI.    Removed Amato catheter 4/1/22 with improvement in her symptoms.    Chronic BLE lymphedema with venous insufficiency and chronic R leg cellulitis   S/p excisional debridement of RLE cicumferential venous hypertensive ulceration and application of wound vac (3/21/22)  S/p intraoperative wound examination, wound debridement, and application of myriad 2 layer graft on 3/28  Defer routine post-operative cares to Dr. Bullock.    Judicious pain control.    Gabapentin 300 mg po TID.    Also has Toradol available q6h prn (monitor Hb).    Lyrica 100 bid.    Defer wound cares to Dr. Bullock. Dressing changed in the hospital on 3/31/22.    Continue PTA lasix, aldactone, and potassium supplement.    More edematous and short of breath on 3/31/22. Improved after giving 3 doses of IV lasix. Transitioned back to oral lasix on 4/1/22, but symptoms worse again on 4/2/22. Weight still up significantly from baseline. Will restart IV lasix for further diuresis.    Severe Obesity  Estimated body mass index is 50.81 kg/m  as calculated from the following:    Height as of this encounter: 1.549 m (5' 1\").    Weight as of this encounter: 122 kg (268 lb 14.4 oz).     Follow up with PCP.      MS    Ambulatory at baseline, though some weakness in lower extremities and intermittent upper extremity weakness.     Hyponatremia, mild, resolved    Sodium 138 on 3/29/22.    GERD    Continue PTA Protonix.    Hyperlipidemia    Continue PTA simvastatin.    Depression    Resumed PTA Celexa, Wellbutrin and Lyrica on 3/26/22. Had been held for a few days given AMS, resumed when mentation back to baseline.    RLS    Continue PTA Requip.       Diet: Advance Diet as Tolerated: Regular Diet Adult    DVT Prophylaxis: Pneumatic Compression " Devices  Amato Catheter: Not present  Central Lines: None  Cardiac Monitoring: ACTIVE order. Indication: hypoxic hypercapneic resp failure  Code Status: Full Code      Disposition Plan   Expected Discharge: 04/02/2022     Anticipated discharge location: home with family    Delays:     Other (Add Comment)            The patient's care was discussed with the Bedside Nurse, Patient and Patient's Family.    Hiren Antonio MD  Hospitalist Service  [facility]  Securely message with the Vocera Web Console (learn more here)  Text page via [employer] Paging/Directory         Clinically Significant Risk Factors Present on Admission                    ______________________________________________________________________    Interval History   Elizabeth Kelley was seen this morning. She doesn't feel good. Short of breath. Had a temperature up to 100.4 this morning. Frequent cough overnight, improved now. Denies chest pain, nausea, abdominal pain. Family has noted increasing confusion/mental status changes similar to earlier in her hospitalization although not nearly as severe at this point. Daughters were in the room with her today, discussed plan of care.    Data reviewed today: I reviewed all medications, new labs and imaging results over the last 24 hours. I personally reviewed no images or EKG's today.    Physical Exam   Vital Signs: Temp: (!) 100.6  F (38.1  C) Temp src: Oral BP: (!) 162/82 Pulse: 94   Resp: 18 SpO2: 92 % O2 Device: Nasal cannula Oxygen Delivery: 2 LPM  Weight: 288 lbs 5.79 oz  Constitutional: awake, alert, cooperative, no apparent distress, up in the recliner  Respiratory: no increased work of breathing, few crackles at the bases  Cardiovascular: regular rate and rhythm, normal S1 and S2, no S3 or S4, no murmur noted  GI: normal bowel sounds, soft, obese, non-distended, non-tender  : Amato catheter has been removed  Skin: warm, dry, dressing in place on right lower  extremity  Musculoskeletal: 2-3+ lower extremity pitting edema present  Neurologic: awake, alert, answers questions appropriately, moves all extremities    Data   Recent Labs   Lab 04/02/22  0719 04/01/22  0650 03/31/22  0624   WBC 20.0* 12.1* 11.0   HGB 8.6* 8.5* 7.7*   MCV 89 89 88   * 489* 412    136 136   POTASSIUM 4.0 3.7 4.2   CHLORIDE 100 101 104   CO2 32 31 27   BUN 34* 36* 32*   CR 0.97 1.04 0.99   ANIONGAP 3 4 5   JUNI 9.3 9.2 9.0   * 104* 94     Medications     - MEDICATION INSTRUCTIONS -       - MEDICATION INSTRUCTIONS -         aspirin  81 mg Oral QPM     buPROPion  150 mg Oral QAM     cefTRIAXone  1 g Intravenous Q24H     citalopram  40 mg Oral QAM     cyanocobalamin  1,000 mcg Oral Daily     diosmin-hesperidin 450-50  1 capsule Oral BID     [Held by provider] enoxaparin ANTICOAGULANT  40 mg Subcutaneous Q24H     ferrous sulfate  325 mg Oral Daily     [Held by provider] furosemide  10 mg Oral BID     furosemide  40 mg Intravenous BID     gabapentin  300 mg Oral TID     ipratropium - albuterol 0.5 mg/2.5 mg/3 mL  3 mL Nebulization Q4H While awake     Aleksandr  1 packet Oral BID     multivitamin w/minerals  1 tablet Oral Daily     pantoprazole  40 mg Oral Daily     polyethylene glycol  17 g Oral Daily     potassium chloride ER  20 mEq Oral QPM     potassium chloride ER  40 mEq Oral QAM     pregabalin  50 mg Oral BID     rOPINIRole  0.5 mg Oral BID     rOPINIRole  1 mg Oral At Bedtime     senna-docusate  1 tablet Oral BID     simvastatin  40 mg Oral At Bedtime     sodium chloride (PF)  3 mL Intracatheter Q8H     spironolactone  25 mg Oral Daily     vitamin B complex with vitamin C  1 tablet Oral Daily     Vitamin D3  250 mcg Oral Daily

## 2022-04-02 NOTE — PROGRESS NOTES
Audible wheezing noted this morning with fever, shortness of breath and hypoxia. Provider has demario notified. Lasix administered. Awaiting new orders from Provider.

## 2022-04-02 NOTE — PLAN OF CARE
Goal Outcome Evaluation:    A&Ox4. VSS, on 3L O2 NC @ bedtime. Pain managed with PO dilaudid and gabapentin. CMS intact exp swelling to LE.  Dressing on RLE in place. IV saline locked. On tele NSR. Noted dry cough and wheezing @ bedtime, PRN robitussin & neb tx given. Will continue to monitor.

## 2022-04-03 ENCOUNTER — APPOINTMENT (OUTPATIENT)
Dept: CT IMAGING | Facility: CLINIC | Age: 77
DRG: 264 | End: 2022-04-03
Attending: HOSPITALIST
Payer: COMMERCIAL

## 2022-04-03 LAB
ANION GAP SERPL CALCULATED.3IONS-SCNC: 4 MMOL/L (ref 3–14)
BASE EXCESS BLDA CALC-SCNC: 9 MMOL/L (ref -9–1.8)
BUN SERPL-MCNC: 31 MG/DL (ref 7–30)
CALCIUM SERPL-MCNC: 9.2 MG/DL (ref 8.5–10.1)
CHLORIDE BLD-SCNC: 100 MMOL/L (ref 94–109)
CO2 SERPL-SCNC: 32 MMOL/L (ref 20–32)
CREAT SERPL-MCNC: 1 MG/DL (ref 0.52–1.04)
ERYTHROCYTE [DISTWIDTH] IN BLOOD BY AUTOMATED COUNT: 16 % (ref 10–15)
FLUAV RNA SPEC QL NAA+PROBE: NEGATIVE
FLUBV RNA RESP QL NAA+PROBE: NEGATIVE
GFR SERPL CREATININE-BSD FRML MDRD: 59 ML/MIN/1.73M2
GLUCOSE BLD-MCNC: 127 MG/DL (ref 70–99)
GLUCOSE BLDC GLUCOMTR-MCNC: 123 MG/DL (ref 70–99)
HCO3 BLD-SCNC: 35 MMOL/L (ref 21–28)
HCT VFR BLD AUTO: 27.8 % (ref 35–47)
HGB BLD-MCNC: 8.2 G/DL (ref 11.7–15.7)
LACTATE SERPL-SCNC: 1.4 MMOL/L (ref 0.7–2)
MAGNESIUM SERPL-MCNC: 2.2 MG/DL (ref 1.6–2.3)
MCH RBC QN AUTO: 26.5 PG (ref 26.5–33)
MCHC RBC AUTO-ENTMCNC: 29.5 G/DL (ref 31.5–36.5)
MCV RBC AUTO: 90 FL (ref 78–100)
MRSA DNA SPEC QL NAA+PROBE: NEGATIVE
NT-PROBNP SERPL-MCNC: 346 PG/ML (ref 0–900)
O2/TOTAL GAS SETTING VFR VENT: 60 %
PCO2 BLD: 55 MM HG (ref 35–45)
PH BLD: 7.41 [PH] (ref 7.35–7.45)
PLATELET # BLD AUTO: 444 10E3/UL (ref 150–450)
PO2 BLD: 143 MM HG (ref 80–105)
POTASSIUM BLD-SCNC: 3.2 MMOL/L (ref 3.4–5.3)
POTASSIUM BLD-SCNC: 3.4 MMOL/L (ref 3.4–5.3)
PROCALCITONIN SERPL-MCNC: 0.51 NG/ML
RBC # BLD AUTO: 3.09 10E6/UL (ref 3.8–5.2)
RSV RNA SPEC NAA+PROBE: NEGATIVE
SA TARGET DNA: NEGATIVE
SARS-COV-2 RNA RESP QL NAA+PROBE: NEGATIVE
SODIUM SERPL-SCNC: 136 MMOL/L (ref 133–144)
TROPONIN I SERPL HS-MCNC: 17 NG/L
TSH SERPL DL<=0.005 MIU/L-ACNC: 1.76 MU/L (ref 0.4–4)
WBC # BLD AUTO: 30.5 10E3/UL (ref 4–11)

## 2022-04-03 PROCEDURE — 250N000011 HC RX IP 250 OP 636: Performed by: SURGERY

## 2022-04-03 PROCEDURE — 250N000011 HC RX IP 250 OP 636

## 2022-04-03 PROCEDURE — 74177 CT ABD & PELVIS W/CONTRAST: CPT

## 2022-04-03 PROCEDURE — 85027 COMPLETE CBC AUTOMATED: CPT | Performed by: HOSPITALIST

## 2022-04-03 PROCEDURE — 82803 BLOOD GASES ANY COMBINATION: CPT | Performed by: HOSPITALIST

## 2022-04-03 PROCEDURE — 84145 PROCALCITONIN (PCT): CPT | Performed by: HOSPITALIST

## 2022-04-03 PROCEDURE — 83880 ASSAY OF NATRIURETIC PEPTIDE: CPT | Performed by: HOSPITALIST

## 2022-04-03 PROCEDURE — 87581 M.PNEUMON DNA AMP PROBE: CPT | Performed by: HOSPITALIST

## 2022-04-03 PROCEDURE — 250N000013 HC RX MED GY IP 250 OP 250 PS 637: Performed by: HOSPITALIST

## 2022-04-03 PROCEDURE — 93005 ELECTROCARDIOGRAM TRACING: CPT

## 2022-04-03 PROCEDURE — 84484 ASSAY OF TROPONIN QUANT: CPT | Performed by: HOSPITALIST

## 2022-04-03 PROCEDURE — 84132 ASSAY OF SERUM POTASSIUM: CPT | Performed by: NURSE PRACTITIONER

## 2022-04-03 PROCEDURE — 99232 SBSQ HOSP IP/OBS MODERATE 35: CPT | Performed by: SURGERY

## 2022-04-03 PROCEDURE — 210N000001 HC R&B IMCU HEART CARE

## 2022-04-03 PROCEDURE — 999N000157 HC STATISTIC RCP TIME EA 10 MIN

## 2022-04-03 PROCEDURE — 80048 BASIC METABOLIC PNL TOTAL CA: CPT | Performed by: HOSPITALIST

## 2022-04-03 PROCEDURE — 87637 SARSCOV2&INF A&B&RSV AMP PRB: CPT | Performed by: HOSPITALIST

## 2022-04-03 PROCEDURE — 87040 BLOOD CULTURE FOR BACTERIA: CPT | Performed by: HOSPITALIST

## 2022-04-03 PROCEDURE — 36600 WITHDRAWAL OF ARTERIAL BLOOD: CPT

## 2022-04-03 PROCEDURE — 250N000013 HC RX MED GY IP 250 OP 250 PS 637: Performed by: SURGERY

## 2022-04-03 PROCEDURE — 999N000128 HC STATISTIC PERIPHERAL IV START W/O US GUIDANCE

## 2022-04-03 PROCEDURE — 210N000002 HC R&B HEART CARE

## 2022-04-03 PROCEDURE — 258N000003 HC RX IP 258 OP 636: Performed by: SURGERY

## 2022-04-03 PROCEDURE — 83735 ASSAY OF MAGNESIUM: CPT | Performed by: NURSE PRACTITIONER

## 2022-04-03 PROCEDURE — 94660 CPAP INITIATION&MGMT: CPT

## 2022-04-03 PROCEDURE — 36415 COLL VENOUS BLD VENIPUNCTURE: CPT | Performed by: HOSPITALIST

## 2022-04-03 PROCEDURE — 94640 AIRWAY INHALATION TREATMENT: CPT | Mod: 76

## 2022-04-03 PROCEDURE — 87641 MR-STAPH DNA AMP PROBE: CPT | Performed by: SPECIALIST

## 2022-04-03 PROCEDURE — 36415 COLL VENOUS BLD VENIPUNCTURE: CPT | Performed by: NURSE PRACTITIONER

## 2022-04-03 PROCEDURE — 99233 SBSQ HOSP IP/OBS HIGH 50: CPT | Performed by: HOSPITALIST

## 2022-04-03 PROCEDURE — 84443 ASSAY THYROID STIM HORMONE: CPT | Performed by: NURSE PRACTITIONER

## 2022-04-03 PROCEDURE — 250N000009 HC RX 250: Performed by: SURGERY

## 2022-04-03 PROCEDURE — 83605 ASSAY OF LACTIC ACID: CPT | Performed by: HOSPITALIST

## 2022-04-03 PROCEDURE — 250N000011 HC RX IP 250 OP 636: Performed by: HOSPITALIST

## 2022-04-03 PROCEDURE — 250N000009 HC RX 250: Performed by: NURSE PRACTITIONER

## 2022-04-03 RX ORDER — NITROGLYCERIN 0.4 MG/1
0.4 TABLET SUBLINGUAL EVERY 5 MIN PRN
Status: DISCONTINUED | OUTPATIENT
Start: 2022-04-03 | End: 2022-04-29 | Stop reason: HOSPADM

## 2022-04-03 RX ORDER — LIDOCAINE 40 MG/G
CREAM TOPICAL
Status: DISCONTINUED | OUTPATIENT
Start: 2022-04-03 | End: 2022-04-03

## 2022-04-03 RX ORDER — PIPERACILLIN SODIUM, TAZOBACTAM SODIUM 3; .375 G/15ML; G/15ML
3.38 INJECTION, POWDER, LYOPHILIZED, FOR SOLUTION INTRAVENOUS EVERY 6 HOURS
Status: DISCONTINUED | OUTPATIENT
Start: 2022-04-03 | End: 2022-04-05

## 2022-04-03 RX ORDER — HEPARIN SODIUM 10000 [USP'U]/100ML
0-5000 INJECTION, SOLUTION INTRAVENOUS CONTINUOUS
Status: DISCONTINUED | OUTPATIENT
Start: 2022-04-03 | End: 2022-04-05 | Stop reason: CLARIF

## 2022-04-03 RX ORDER — DILTIAZEM HYDROCHLORIDE 5 MG/ML
25 INJECTION INTRAVENOUS ONCE
Status: COMPLETED | OUTPATIENT
Start: 2022-04-03 | End: 2022-04-03

## 2022-04-03 RX ORDER — IOPAMIDOL 755 MG/ML
135 INJECTION, SOLUTION INTRAVASCULAR ONCE
Status: COMPLETED | OUTPATIENT
Start: 2022-04-03 | End: 2022-04-03

## 2022-04-03 RX ADMIN — Medication 1 CAPSULE: at 16:19

## 2022-04-03 RX ADMIN — GUAIFENESIN 10 ML: 100 SOLUTION ORAL at 09:42

## 2022-04-03 RX ADMIN — GUAIFENESIN 10 ML: 100 SOLUTION ORAL at 20:04

## 2022-04-03 RX ADMIN — VANCOMYCIN HYDROCHLORIDE 2500 MG: 5 INJECTION, POWDER, LYOPHILIZED, FOR SOLUTION INTRAVENOUS at 20:05

## 2022-04-03 RX ADMIN — ROPINIROLE HYDROCHLORIDE 1 MG: 0.5 TABLET, FILM COATED ORAL at 21:55

## 2022-04-03 RX ADMIN — POTASSIUM CHLORIDE 40 MEQ: 1500 TABLET, EXTENDED RELEASE ORAL at 09:42

## 2022-04-03 RX ADMIN — MULTIPLE VITAMINS W/ MINERALS TAB 1 TABLET: TAB at 09:43

## 2022-04-03 RX ADMIN — ACETAMINOPHEN 650 MG: 325 TABLET, FILM COATED ORAL at 13:55

## 2022-04-03 RX ADMIN — POTASSIUM CHLORIDE 20 MEQ: 1500 TABLET, EXTENDED RELEASE ORAL at 20:03

## 2022-04-03 RX ADMIN — SODIUM CHLORIDE 100 ML: 9 INJECTION, SOLUTION INTRAVENOUS at 18:06

## 2022-04-03 RX ADMIN — IPRATROPIUM BROMIDE AND ALBUTEROL SULFATE 3 ML: .5; 3 SOLUTION RESPIRATORY (INHALATION) at 19:46

## 2022-04-03 RX ADMIN — SENNOSIDES AND DOCUSATE SODIUM 1 TABLET: 50; 8.6 TABLET ORAL at 09:42

## 2022-04-03 RX ADMIN — IPRATROPIUM BROMIDE AND ALBUTEROL SULFATE 3 ML: .5; 3 SOLUTION RESPIRATORY (INHALATION) at 22:39

## 2022-04-03 RX ADMIN — POLYETHYLENE GLYCOL 3350 17 G: 17 POWDER, FOR SOLUTION ORAL at 09:41

## 2022-04-03 RX ADMIN — ROPINIROLE HYDROCHLORIDE 0.5 MG: 0.5 TABLET, FILM COATED ORAL at 09:42

## 2022-04-03 RX ADMIN — CEFTRIAXONE SODIUM 1 G: 1 INJECTION, POWDER, FOR SOLUTION INTRAMUSCULAR; INTRAVENOUS at 11:07

## 2022-04-03 RX ADMIN — FUROSEMIDE 40 MG: 10 INJECTION, SOLUTION INTRAVENOUS at 08:58

## 2022-04-03 RX ADMIN — HYDROMORPHONE HYDROCHLORIDE 4 MG: 2 TABLET ORAL at 09:39

## 2022-04-03 RX ADMIN — IPRATROPIUM BROMIDE AND ALBUTEROL SULFATE 3 ML: .5; 3 SOLUTION RESPIRATORY (INHALATION) at 13:00

## 2022-04-03 RX ADMIN — HEPARIN SODIUM 1200 UNITS/HR: 1000 INJECTION INTRAVENOUS; SUBCUTANEOUS at 22:44

## 2022-04-03 RX ADMIN — CYANOCOBALAMIN TAB 1000 MCG 1000 MCG: 1000 TAB at 09:41

## 2022-04-03 RX ADMIN — PIPERACILLIN AND TAZOBACTAM 3.38 G: 3; .375 INJECTION, POWDER, FOR SOLUTION INTRAVENOUS at 12:51

## 2022-04-03 RX ADMIN — PIPERACILLIN AND TAZOBACTAM 3.38 G: 3; .375 INJECTION, POWDER, FOR SOLUTION INTRAVENOUS at 18:56

## 2022-04-03 RX ADMIN — ACETAMINOPHEN 650 MG: 325 TABLET, FILM COATED ORAL at 20:01

## 2022-04-03 RX ADMIN — BUPROPION HYDROCHLORIDE 150 MG: 150 TABLET, EXTENDED RELEASE ORAL at 11:06

## 2022-04-03 RX ADMIN — GABAPENTIN 300 MG: 300 CAPSULE ORAL at 16:18

## 2022-04-03 RX ADMIN — IOPAMIDOL 135 ML: 755 INJECTION, SOLUTION INTRAVENOUS at 18:06

## 2022-04-03 RX ADMIN — Medication 250 MCG: at 09:42

## 2022-04-03 RX ADMIN — GUAIFENESIN 10 ML: 100 SOLUTION ORAL at 13:57

## 2022-04-03 RX ADMIN — SENNOSIDES AND DOCUSATE SODIUM 1 TABLET: 50; 8.6 TABLET ORAL at 21:55

## 2022-04-03 RX ADMIN — PREGABALIN 50 MG: 50 CAPSULE ORAL at 22:44

## 2022-04-03 RX ADMIN — CITALOPRAM HYDROBROMIDE 40 MG: 20 TABLET, FILM COATED ORAL at 09:43

## 2022-04-03 RX ADMIN — SIMVASTATIN 40 MG: 40 TABLET, FILM COATED ORAL at 21:55

## 2022-04-03 RX ADMIN — ASPIRIN 81 MG CHEWABLE TABLET 81 MG: 81 TABLET CHEWABLE at 20:03

## 2022-04-03 RX ADMIN — Medication 1 CAPSULE: at 22:34

## 2022-04-03 RX ADMIN — FUROSEMIDE 40 MG: 10 INJECTION, SOLUTION INTRAVENOUS at 16:18

## 2022-04-03 RX ADMIN — GABAPENTIN 300 MG: 300 CAPSULE ORAL at 09:43

## 2022-04-03 RX ADMIN — PANTOPRAZOLE SODIUM 40 MG: 40 TABLET, DELAYED RELEASE ORAL at 09:42

## 2022-04-03 RX ADMIN — B-COMPLEX W/ C & FOLIC ACID TAB 1 TABLET: TAB at 09:42

## 2022-04-03 RX ADMIN — SPIRONOLACTONE 25 MG: 25 TABLET ORAL at 09:43

## 2022-04-03 RX ADMIN — FERROUS SULFATE TAB 325 MG (65 MG ELEMENTAL FE) 325 MG: 325 (65 FE) TAB at 09:42

## 2022-04-03 RX ADMIN — IPRATROPIUM BROMIDE AND ALBUTEROL SULFATE 3 ML: .5; 3 SOLUTION RESPIRATORY (INHALATION) at 15:44

## 2022-04-03 RX ADMIN — PREGABALIN 50 MG: 50 CAPSULE ORAL at 09:40

## 2022-04-03 RX ADMIN — IPRATROPIUM BROMIDE AND ALBUTEROL SULFATE 3 ML: .5; 3 SOLUTION RESPIRATORY (INHALATION) at 08:47

## 2022-04-03 RX ADMIN — ROPINIROLE HYDROCHLORIDE 0.5 MG: 0.5 TABLET, FILM COATED ORAL at 12:51

## 2022-04-03 RX ADMIN — GABAPENTIN 300 MG: 300 CAPSULE ORAL at 21:55

## 2022-04-03 RX ADMIN — DILTIAZEM HYDROCHLORIDE 25 MG: 5 INJECTION INTRAVENOUS at 22:00

## 2022-04-03 RX ADMIN — Medication 1 PACKET: at 11:14

## 2022-04-03 ASSESSMENT — ACTIVITIES OF DAILY LIVING (ADL)
ADLS_ACUITY_SCORE: 13
ADLS_ACUITY_SCORE: 11
ADLS_ACUITY_SCORE: 13
ADLS_ACUITY_SCORE: 11
ADLS_ACUITY_SCORE: 13
ADLS_ACUITY_SCORE: 13
ADLS_ACUITY_SCORE: 11
ADLS_ACUITY_SCORE: 13
ADLS_ACUITY_SCORE: 13
ADLS_ACUITY_SCORE: 11

## 2022-04-03 ASSESSMENT — CHADS2 SCORE
CHF: NO
HYPERTENSION: YES
AGE GREATER THAN OR EQUAL TO 75: NO

## 2022-04-03 NOTE — CONSULTS
[facility]    Infectious Disease Consultation     Date of Admission:  3/21/2022  Date of Consult : 04/03/22    Assessment:  76YF with morbid obesity who has been hospitalized since 3/21with venous ulcers, RLE cellulitis and is s/p excisional debridement of RLE ulcer with wound vac. Now with fever and marked leukocytosis of 30.5 , no diarrhea to suggest C.diff, she has increasing hypoxia with stable appearing CXR but is at risk for aspiration/PE, covid/influenza negative, procalcitonin mildly elevated . She  has mild abdominal pain but no other focal complaints, and needs additional work up to determine source of infection.    Recommendations  1. Blood cxs X 2 sets  2. CT chest PE and abdomen/pelvis W contrast  3. Sputum cx, UA appears benign  4. Check stool for C.diff if diarrhea  5. MRSA PCR  6. Maintain on Zosyn, add Vancomycin    LE wound appears stable  Follow clinical status and labs, ID will follow     Discussed with Dr. Marisela Chow MD    Reason for Consult    I was asked to evaluate this patient for antimicrobial recommendations for sepsis    Primary Care Physician   Francisco Ramirez    Chief Complaint   Shortness of breath and leukocytosis    History is obtained from the patient and medical records    History of Present Illness   Elizabeth Kelley is a 76 year old female with morbid obesity, chronic venous ulcers, lymphedema, GERD, hyperlipidemia, and depression who underwent elective excisional debridement of RLE cicumferential venous hypertensive ulceration and application of wound vac on 3/21/22.     She has developed fever with increasing leukocytosis.RLE dressing was hanged by the surgical team today and wound appeared stable without signs of infection. She has become progressive more hypoxic but CXR was stable appearing. She denies diarrhea, has some abdominal tenderness, denies urinary symptoms. ID has been asked to assist with further management. She notes  occasionally coughing up food particles.    Past Medical History   I have reviewed this patient's medical history and updated it with pertinent information if needed.   Past Medical History:   Diagnosis Date     Ankle contracture, left      Class 3 severe obesity due to excess calories without serious comorbidity with body mass index (BMI) of 45.0 to 49.9 in adult (H)      Combined gastric and duodenal ulcer      Controlled substance agreement broken      Depression      Dyslipidemia      Edema      Edema      Gait abnormality      GERD (gastroesophageal reflux disease)      Gout      Lymphedema of both lower extremities      Melanoma (H)     left upper arm     MS (multiple sclerosis) (H)      RLS (restless legs syndrome)      Skin ulcer of left lower leg (H)      Valgus deformity of both feet      Vitamin D deficiency        Past Surgical History   I have reviewed this patient's surgical history and updated it with pertinent information if needed.  Past Surgical History:   Procedure Laterality Date     ABLATION SAPHENOUS VEIN W/ RFA Right 04/12/2021    Saphenous vein, anterior accessory saphenous vein, sclerotherapy of multiple veins.     COSMETIC SURGERY  1988    reduction of excess skin from weight loss     ESOPHAGOSCOPY, GASTROSCOPY, DUODENOSCOPY (EGD), COMBINED N/A 03/30/2015    Procedure: UPPER ENDOSCOPY with gastric biopsy;  Surgeon: Checo Fletcher MD;  Location: [facility];  Service:      IRRIGATION AND DEBRIDEMENT LOWER EXTREMITY, COMBINED Right 3/21/2022    Procedure: RIGHT LEG WOUND DEBRIDEMENT, PAULIE DERMATONE, MISONIX, VAC VERA FLOW WITH VASHE;  Surgeon: Gabriele Bullock MD;  Location:  OR     IRRIGATION AND DEBRIDEMENT LOWER EXTREMITY, COMBINED Right 3/28/2022    Procedure: DEBRIDEMENT AND WOUND DRESSING CHANGE AND MYRIAD APPLICATION OF RIGHT LOWER LEG WOUND;  Surgeon: Gabriele Bullock MD;  Location:  OR     MAMMOPLASTY REDUCTION Bilateral      MOHS MICROGRAPHIC PROCEDURE      left upper  "arm, melanoma     PICC SINGLE LUMEN PLACEMENT  8/13/2021          PICC SINGLE LUMEN PLACEMENT  9/2/2021          SPINE SURGERY      L5 area surgery, decompression       Prior to Admission Medications   Prior to Admission Medications   Prescriptions Last Dose Informant Patient Reported? Taking?   Bioflavonoid Products (CINDY-C) 500-550 MG TABS 3/20/2022 at PM Self No Yes   Sig: Take 1 tablet by mouth 2 times daily   HYDROcodone-acetaminophen (NORCO) 5-325 MG tablet Past Month at PRN Self Yes Yes   Sig: TAKE 1 TABLET BY MOUTH EVERY 8 HOURS AS NEEDED FOR 4 DAYS   Nutritional Supplements (VARICOSE VEINS FORMULA OR) 3/20/2022 at AM Self Yes Yes   Sig: Take 1 tablet by mouth every morning   Skin Protectants, Misc. (INTERDRY 10\"X36\") SHEE  Self No Yes   Sig: Externally apply 7 Units topically once as needed (change daily in groin rash/fold)   aspirin 81 mg chewable tablet 3/11/2022 at PM Self Yes Yes   Sig: Take 81 mg by mouth every evening    buPROPion (WELLBUTRIN SR) 150 MG 12 hr tablet 3/20/2022 at AM Self Yes Yes   Sig: Take 150 mg by mouth every morning    citalopram (CELEXA) 40 MG tablet 3/20/2022 at AM Self Yes Yes   Sig: Take 40 mg by mouth every morning    cyanocobalamin (VITAMIN B-12) 1000 MCG tablet 3/20/2022 at AM Self No Yes   Sig: Take 1 tablet (1,000 mcg) by mouth daily   furosemide (LASIX) 20 MG tablet 3/20/2022 at pm Self Yes Yes   Sig: Take 10 mg by mouth 2 times daily AM and afternoon (4PM)   multivitamin w/minerals (CENTRUM ADULTS) tablet 3/20/2022 at AM Self Yes Yes   Sig: Take 1 tablet by mouth daily    naproxen sodium (ALEVE) 220 MG tablet 3/18/2022 at PRN Self Yes No   Sig: Take 440 mg by mouth daily as needed (220MG X 2 = 440MG)   pantoprazole (PROTONIX) 40 MG tablet 3/20/2022 at AM Self Yes Yes   Sig: [PANTOPRAZOLE (PROTONIX) 40 MG TABLET] Take 40 mg by mouth daily.   potassium chloride ER (KLOR-CON M) 20 MEQ CR tablet 3/20/2022 at PM Self Yes Yes   Sig: Take 20 mEq by mouth every evening (TAKE IN " ADDITION TO AM/NOON DOSE FOR TOTAL 30mEq DAILY)   potassium chloride ER (KLOR-CON M) 20 MEQ CR tablet 3/20/2022 at am Self Yes No   Sig: Take 40 mEq by mouth every morning    pregabalin (LYRICA) 100 MG capsule 3/20/2022 at PM Self Yes Yes   Sig: Take 100 mg by mouth 2 times daily    rOPINIRole (REQUIP) 1 MG tablet 3/20/2022 at PM Self Yes Yes   Sig: Take 1 mg by mouth At Bedtime (TAKE IN ADDITION TO AM/NOON DOSE FOR TOTAL 2MG DAILY)   rOPINIRole (REQUIP) 1 MG tablet 3/20/2022 at 1200 Self Yes No   Sig: Take 0.5 mg by mouth 2 times daily IN THE MORNING AND AT NOON  (1MG X 0.5 = 0.5MG)  (TAKE IN ADDITION TO PM DOSE FOR TOTAL 2MG DAILY)   simvastatin (ZOCOR) 40 MG tablet 3/20/2022 at PM Self Yes Yes   Sig: [SIMVASTATIN (ZOCOR) 40 MG TABLET] Take 40 mg by mouth bedtime.   spironolactone (ALDACTONE) 25 MG tablet 3/20/2022 at AM Self Yes Yes   Sig: [SPIRONOLACTONE (ALDACTONE) 25 MG TABLET] Take 25 mg by mouth daily.   vitamin B-Complex 3/20/2022 at AM Self No Yes   Sig: Take 1 tablet by mouth daily   vitamin D3 (CHOLECALCIFEROL) 250 mcg (51953 units) capsule 3/20/2022 at AM Self No Yes   Sig: Take 1 capsule (250 mcg) by mouth daily      Facility-Administered Medications: None     Allergies   Allergies   Allergen Reactions     Other Environmental Allergy Anaphylaxis     Bees/Wasps Stings     Adhesive Tape Other (See Comments)     Red,itchy and oozes     Adhesive [Mecrylate] Unknown     Other reaction(s): sores     Percocet [Oxycodone-Acetaminophen] Rash     Vicodin [Hydrocodone-Acetaminophen] Nausea and Vomiting and Hives       Immunization History   Immunization History   Administered Date(s) Administered     COVID-19,PF,Pfizer (12+ Yrs) 03/15/2021, 04/05/2021     FLUAD(HD)65+ QUAD 11/10/2021     Flu, Unspecified 09/23/2009, 09/19/2011, 12/11/2014, 08/30/2017     Influenza (IIV3) PF 09/23/2009, 09/19/2011, 12/11/2014     Influenza Vaccine IM > 6 months Valent IIV4 (Alfuria,Fluzone) 11/30/2016, 09/21/2020     Pneumo Conj  13-V (2010&after) 06/14/2018     Pneumococcal 23 valent 09/21/2020     TDAP Vaccine (Adacel) 10/14/2008       Social History   I have reviewed this patient's social history and updated it with pertinent information if needed. Elizabeth Kelley  reports that she quit smoking about 46 years ago. Her smoking use included cigarettes. She has a 3.00 pack-year smoking history. She has never used smokeless tobacco. She reports current alcohol use of about 1.0 standard drink of alcohol per week. She reports that she does not use drugs.    Family History   I have reviewed this patient's family history and updated it with pertinent information if needed.   Family History   Problem Relation Age of Onset     Uterine Cancer Mother      Hyperlipidemia Mother      Hypertension Mother      Multiple Sclerosis Mother      Asthma Sister      Obesity Sister      Hyperlipidemia Sister      Hypertension Sister      Multiple Sclerosis Sister      Arthritis Sister      Colon Cancer Paternal Aunt      Breast Cancer Paternal Aunt      Hypertension Paternal Aunt      Hyperlipidemia Paternal Aunt      Brain Cancer Father      Arthritis Maternal Uncle      Arthritis Maternal Grandmother      Early Death Maternal Grandfather      Arthritis Paternal Grandmother      Arthritis Paternal Grandfather        Review of Systems   The 10 point Review of Systems is negative other than noted in the HPI or here.     Physical Exam   Temp: (!) 101  F (38.3  C) Temp src: Axillary BP: 124/60 Pulse: 90   Resp: 18 SpO2: 92 % O2 Device: Nasal cannula Oxygen Delivery: 4 LPM  Vital Signs with Ranges  Temp:  [99  F (37.2  C)-102  F (38.9  C)] 101  F (38.3  C)  Pulse:  [81-95] 90  Resp:  [16-24] 18  BP: (117-145)/(45-86) 124/60  FiO2 (%):  [40 %-60 %] 50 %  SpO2:  [90 %-98 %] 92 %  288 lbs 5.79 oz  Body mass index is 54.49 kg/m .    GENERAL APPEARANCE:  awake  EYES: Eyes grossly normal to inspection  NECK: no adenopathy  RESP: lungs clear   CV: regular rates and  rhythm  ABDOMEN: soft, diffusely tender  MS: dressings in place not removed  Psych: affect normal     Data   All laboratory data reviewed  Component      Latest Ref Rng & Units 4/3/2022   WBC      4.0 - 11.0 10e3/uL 30.5 (H)   RBC Count      3.80 - 5.20 10e6/uL 3.09 (L)   Hemoglobin      11.7 - 15.7 g/dL 8.2 (L)   Hematocrit      35.0 - 47.0 % 27.8 (L)   MCV      78 - 100 fL 90   MCH      26.5 - 33.0 pg 26.5   MCHC      31.5 - 36.5 g/dL 29.5 (L)   RDW      10.0 - 15.0 % 16.0 (H)   Platelet Count      150 - 450 10e3/uL 444     Component      Latest Ref Rng & Units 4/3/2022   Sodium      133 - 144 mmol/L 136   Potassium      3.4 - 5.3 mmol/L 3.4   Chloride      94 - 109 mmol/L 100   Carbon Dioxide      20 - 32 mmol/L 32   Anion Gap      3 - 14 mmol/L 4   Urea Nitrogen      7 - 30 mg/dL 31 (H)   Creatinine      0.52 - 1.04 mg/dL 1.00   Calcium      8.5 - 10.1 mg/dL 9.2   Glucose      70 - 99 mg/dL 127 (H)   GFR Estimate      >60 mL/min/1.73m2 59 (L)     Component      Latest Ref Rng & Units 4/3/2022   Influenza A      Negative Negative   Influenza B      Negative Negative   Resp Syncytial Virus      Negative Negative   SARS CoV2 PCR      Negative Negative     Component      Latest Ref Rng & Units 4/3/2022   Procalcitonin      <0.05 ng/mL 0.51 (H)   N-Terminal Pro BNP Inpatient      0 - 900 pg/mL 346     Imaging  4/2 CXR  EXAM: XR CHEST PORT 1 VIEW  LOCATION: [facility]  DATE/TIME: 4/2/2022 3:24 PM     INDICATION: Fever, hypoxia.  COMPARISON: 3/24/2022.                                                                      IMPRESSION: Negative chest.

## 2022-04-03 NOTE — PHARMACY-VANCOMYCIN DOSING SERVICE
"Pharmacy Vancomycin Initial Note  Date of Service April 3, 2022  Patient's  1945  76 year old, female    Indication: Sepsis    Current estimated CrCl = Estimated Creatinine Clearance: 63.1 mL/min (based on SCr of 1 mg/dL).    Creatinine for last 3 days  2022:  6:50 AM Creatinine 1.04 mg/dL  2022:  7:19 AM Creatinine 0.97 mg/dL  4/3/2022: 12:58 PM Creatinine 1.00 mg/dL    Recent Vancomycin Level(s) for last 3 days  No results found for requested labs within last 72 hours.      Vancomycin IV Administrations (past 72 hours)      No vancomycin orders with administrations in past 72 hours.                Nephrotoxins and other renal medications (From now, onward)    Start     Dose/Rate Route Frequency Ordered Stop    22 1800  vancomycin 1250 mg in 0.9% NaCl 250 mL intermittent infusion 1,250 mg         1,250 mg  over 90 Minutes Intravenous EVERY 24 HOURS 22 1711      22 1800  vancomycin 2500 mg in 0.9% NaCl 500 ml intermittent infusion 2,500 mg         2,500 mg  over 120 Minutes Intravenous ONCE 22 1654      22 1200  piperacillin-tazobactam (ZOSYN) 3.375 g vial to attach to  mL bag        Note to Pharmacy: For [facility], [facility] and [facility]: For Zosyn-naive patients, use the \"Zosyn initial dose + extended infusion\" order panel.    3.375 g  over 30 Minutes Intravenous EVERY 6 HOURS 22 1122      22 1130  furosemide (LASIX) injection 40 mg         40 mg  over 1-3 Minutes Intravenous 2 TIMES DAILY (Diuretics and Nitrates) 22 1102      22 1744  [Held by provider]  furosemide (LASIX) half-tab 10 mg        (Held by provider since Sat 2022 at 1102 by Hiren Antonio MD.Hold Reason: Other.)    10 mg Oral 2 TIMES DAILY 22 1744            Contrast Orders - past 72 hours (72h ago, onward)            None          [employer] Prediction of Planned Initial Vancomycin Regimen  oading dose: 2500 mg at 17:00 2022.  Regimen: 500 mg IV every 12 " hours.  Start time: 17:13 on 04/03/2022  Exposure target: AUC24 (range)400-600 mg/L.hr   AUC24,ss: 422 mg/L.hr  Probability of AUC24 > 400: 54 %  Ctrough,ss: 14.1 mg/L  Probability of Ctrough,ss > 20: 30 %  Probability of nephrotoxicity (Lodise LOUIS 2009): 9 %          Plan:  1. Start vancomycin  1250 mg IV q24h after loading dose of 2500mg x 1   2. Vancomycin monitoring method: AUC  3. Vancomycin therapeutic monitoring goal: 400-600 mg*h/L  4. Pharmacy will check vancomycin levels as appropriate in 1-3 Days.    5. Serum creatinine levels will be ordered daily for the first week of therapy and at least twice weekly for subsequent weeks.      Patricia Celestin RP

## 2022-04-03 NOTE — PROGRESS NOTES
RT called for ABG. Pt awake, watching TV on arrival, not confused.   SpO2 97% on Bipap 60%. RR 16-18.  ABG obtained, will continue to follow.

## 2022-04-03 NOTE — PROVIDER NOTIFICATION
Patient lethargic, SOB, and O2 sats 90-91% with BiPAP on at 60% FiO2. Provider was notified. New order for ABG lab in place. Awaiting result.

## 2022-04-03 NOTE — PROGRESS NOTES
WOUND CARE    Elizabeth Kelley has a chronic right calf ulcer that is undergone multiple treatments by .  Dressings were changed at bedside on 3/31/2022 using Xeroform over the site followed by EdemaWear absorptive dressings and a 2 layer compression wrap.    Patient has a fever to 101 yesterday morning with a WBC= 20,000.Chest x-ray is normal.      I was concerned that her dressing has not been changed and there may be an underlying cellulitis in the left bedside change was indicated and discussed with patient.  Removed the entire dressing of the right leg including the Xeroform.  Underlying grafted area and raw tissue looks good with no signs of infection though the dressing was very saturated.  Excellent arterial blood supply with good bleeding from all sites.  No purulence.  Redressed with Xeroform /gauze/Kerlix rolls.      PLAN: Wound nurses from the clinic will readdress wound tomorrow.  Discussed plan with patient and nursing staff    25 minutes with patient today at bedside        Quan Floyd MD

## 2022-04-03 NOTE — PLAN OF CARE
Goal Outcome Evaluation:    ABG result obtained by RT. See results tab. Pt stable at this time. A&Ox4. VSS, BiPAP on. O2 sats 94-98%. Patient denied SOB. Pain managed with prn dilaudid. CMS intact exp swelling to LE.  Dressing on RLE in place. IV saline locked. Purewick in place. On tele NSR. Intermittent productive cough present. Will continue to monitor.

## 2022-04-03 NOTE — PROGRESS NOTES
Patient running high temperature and cough present with SOB since early this morning. Provider notified. Awaiting for new order for Covid-19 test.

## 2022-04-03 NOTE — PROGRESS NOTES
Multiple episodes of fever noted during shift. Sepsis protocol also occurred during shift. Lactic acid is normal. Provider also ordered an CXR earlier for Pneumonia. Result is negative for pneumonia. Patient is currently on supplemental O2 for hypoxia. IV lasix ordered. Putting out significant output.   Oral fluids encouraged to maintain adequate hydration. Provider has also started IV Rocephin. Care team will continue to monitor.

## 2022-04-03 NOTE — PROGRESS NOTES
[facility]    Medicine Progress Note - Hospitalist Service    Date of Admission:  3/21/2022    Assessment & Plan        Elizabeth Kelley is a 76 year old female with PMHx MS, chronic BLE lymphedema with venous insufficiency and chronic R leg cellulitis, GERD, hyperlipidemia, and depression who underwent previously scheduled excisional debridement of RLE cicumferential venous hypertensive ulceration and application of wound vac on 3/21/22. Hospitalist service re-consulted 3/23/22 for decreased responsiveness, meeting sepsis criteria with suspected pneumonia as source. She was started on empiric treatment with improvement in her level of unreponsiveness and resolution of her hypoxemia.      Sepsis secondary to CAP vs aspiration pneumonia  Acute hypoxic respiratory failure secondary to above, improved initially, but worse again now  Hx of MRSA  Tmax 101.5, tachycardic in the 110s, hypoxic to 86% on room air. WBC 18.3, procal 0.17. Lactic 1.3. UA with trace LE, WBC 7. . VBG 7.33/54/57/28. CXR with patchy airspace opacities in the left mid and lower lung, suspicious for pneumonia.  Highest suspicion for pneumonia given the above, though note hx of recurrent RLE cellulitis with procedure as above. Hx of MRSA. RLE with wound vac in place.  Was volume resusciated and started on antibiotics including zosyn and vancomycin, and since narrowed to zosyn.  Blood cultures- NGTD.  Repeat CXR on 3/28 notable for new right upper lobe infiltrate and basilar infiltrate lateral views.  Did receive some diuretic for the volume overload.    RCAT consulted, encourage pulmonary toilet with IS and acapella.    Duonebs q4 hrs while awake, PRN albuterol nebs.    Monitor fluid status.    Resumed PTA Lasix and Aldactone.     Completed 7 day course of zosyn on 3/30/22. Then on oral augmentin for lower extremity cellulitis with plan to complete 14 day total course of antibiotics, then transitioned back to IV  antibiotics as noted below.    Robitussin prn.    On RA, will ask PT to ambulate.    SLP consulted - no evidence of dysphagia.    Fever  Fever up to 102 on 4/3/22. WBC trending up. Procalcitonin mildly elevated. Unclear etiology. Short of breath and coughing. Mildly hypoxic. Daughter had been visiting, but stayed home on 4/3/22 as the daughter had developed a feer and cough as well.    Obtain blood cultures.    COVID-19, RSV, and Influenza PCR negative on 4/3/22.    UA not suggestive of UTI on 4/1/22.    Chest x-ray negative on 4/2/22.    Surgical site on right lower extremity shows no signs of infection per Dr. Floyd's note on 4/3/22.    Switched antibiotics back to IV Zosyn.    CT PE chest/abdomen/pelvis ordered.    Will ask ID to evaluate patient, appreciate their assistance.     Encephalopathy, multifactorial- resolved  Altered mentation noted by nursing during hosptialization. Multifactorial in the setting of fever, narcotic pain medications, suspected underlying BOLIVAR, possible dehydration. Received dose of narcan, but did not significantly change pts mentation immediately, though seemed to improve within the hour after IVF bolus was near completion. Mental status continues to improved.    Continue expectant management.    Judicious pain med use.    Bipap prn.    Suspect underlying BOLVIAR, urged her to get evaluated as outpatient.    Resumed PTA Wellbutrin, Lyrica and Requip as per the patient's request but will closely monitor mentation, especially if she will get pain medications; low threshold to hold these meds if she becomes confused/lethargic.    Decreased PTA lyrica on 4/2/22 due to developing mental status changes, would consider tapering off and just using gabapentin. May need to hold/decrease other meds as well pending clinical course.     Acute blood loss anemia  Chronic normocytic anemia  Baseline 10-11. Down to 8.3 post-procedure. No active bleeding. Could be dilutional as checked while receiving IVF  "bolus   3/24 - Hb down to 6.1 but repeated Hb 7.1, again down to 6.4. Transfused 1 unit PRBCs - 3/25/22. Transfused another unit PRBC on 3/27/22 for hemoglobin 6.7.    Discontinued scheduled Celebrex.    Iron studies suggestive of iron deficiency, started on oral iron supplement.    Hemoglobin slowly trended up, 8.2 on 4/3/22.     Hematuria  Noted in AM on 3/30/22. Creatinine stable at 1/14. Monitor for now. Already on Zosyn, so low suspicion for new UTI. Could be related to trauma/irritation from Amato catheter.    Resolved as of 3/31/22.    PRN B&O suppository ordered for bladder spasms. UA not suggestive of UTI on 4/1/22.    Removed Amato catheter 4/1/22 with improvement in her spasm symptoms.    Chronic BLE lymphedema with venous insufficiency and chronic R leg cellulitis   S/p excisional debridement of RLE cicumferential venous hypertensive ulceration and application of wound vac (3/21/22)  S/p intraoperative wound examination, wound debridement, and application of myriad 2 layer graft on 3/28  Defer routine post-operative cares to Dr. Bullock.    Judicious pain control.    Gabapentin 300 mg po TID.    Also has Toradol available q6h prn (monitor Hb).    Lyrica 100 bid.    Defer wound cares to Dr. Bullock. Dressing changed in the hospital on 3/31/22.    Continue PTA lasix, aldactone, and potassium supplement.    More edematous and short of breath on 3/31/22. Improved after giving 3 doses of IV lasix. Transitioned back to oral lasix on 4/1/22, but symptoms worse again on 4/2/22. Weight still up significantly from baseline. Restarted IV lasix for further diuresis, will check BNP. Volume status difficult to assess in setting of obesity and lymphedema.    Severe Obesity  Estimated body mass index is 50.81 kg/m  as calculated from the following:    Height as of this encounter: 1.549 m (5' 1\").    Weight as of this encounter: 122 kg (268 lb 14.4 oz).     Follow up with PCP.      MS    Ambulatory at baseline, though some " weakness in lower extremities and intermittent upper extremity weakness.     Hyponatremia, mild, resolved    Sodium 138 on 3/29/22.    GERD    Continue PTA Protonix.    Hyperlipidemia    Continue PTA simvastatin.    Depression    Resumed PTA Celexa, Wellbutrin and Lyrica on 3/26/22. Had been held for a few days given AMS, resumed when mentation back to baseline.    RLS    Continue PTA Requip.       Diet: Advance Diet as Tolerated: Regular Diet Adult    DVT Prophylaxis: Pneumatic Compression Devices  Amato Catheter: Not present  Central Lines: None  Cardiac Monitoring: ACTIVE order. Indication: hypoxic hypercapneic resp failure  Code Status: Full Code      Disposition Plan   Expected Discharge: 04/04/2022     Anticipated discharge location: home with family    Delays:     Other (Add Comment)            The patient's care was discussed with the Bedside Nurse and Patient.    Hiren Antonio MD  Hospitalist Service  [facility]  Securely message with the Vocera Web Console (learn more here)  Text page via [employer] Paging/Directory         Clinically Significant Risk Factors Present on Admission                    ______________________________________________________________________    Interval History   Elizabeth Kelley was seen this morning. She feels OK, but doesn't feel good. Ongoing cough. Mild shortness of breath. Intermittent fevers. Some pain in her right lower extremity, stable. Denies chest pain, nausea, abdominal pain, diarrhea.    Data reviewed today: I reviewed all medications, new labs and imaging results over the last 24 hours. I personally reviewed no images or EKG's today.    Physical Exam   Vital Signs: Temp: (!) 101.1  F (38.4  C) Temp src: Axillary BP: 117/73 Pulse: 90   Resp: 20 SpO2: 90 % O2 Device: Nasal cannula Oxygen Delivery: 3 LPM  Weight: 288 lbs 5.79 oz  Constitutional: awake, alert, cooperative, no apparent distress, laying in the hospital bed, appears  ill  Respiratory: clear to auscultation anteriorly, no crackles or wheezing  Cardiovascular: regular rate and rhythm, normal S1 and S2, no murmur noted  GI: normal bowel sounds, soft, obese, non-distended, non-tender  Skin: warm, dry, dressing in place on right lower extremity  Musculoskeletal: 2-3+ bilateral lower extremity pitting edema present  Neurologic: awake, alert, answers questions appropriately, moves all extremities    Data   Recent Labs   Lab 04/03/22  1258 04/02/22  0719 04/01/22  0650   WBC 30.5* 20.0* 12.1*   HGB 8.2* 8.6* 8.5*   MCV 90 89 89    489* 489*    135 136   POTASSIUM 3.4 4.0 3.7   CHLORIDE 100 100 101   CO2 32 32 31   BUN 31* 34* 36*   CR 1.00 0.97 1.04   ANIONGAP 4 3 4   JUNI 9.2 9.3 9.2   * 128* 104*     Medications     - MEDICATION INSTRUCTIONS -       - MEDICATION INSTRUCTIONS -         aspirin  81 mg Oral QPM     buPROPion  150 mg Oral QAM     citalopram  40 mg Oral QAM     cyanocobalamin  1,000 mcg Oral Daily     diosmin-hesperidin 450-50  1 capsule Oral BID     [Held by provider] enoxaparin ANTICOAGULANT  40 mg Subcutaneous Q24H     ferrous sulfate  325 mg Oral Daily     [Held by provider] furosemide  10 mg Oral BID     furosemide  40 mg Intravenous BID     gabapentin  300 mg Oral TID     ipratropium - albuterol 0.5 mg/2.5 mg/3 mL  3 mL Nebulization Q4H While awake     Aleksandr  1 packet Oral BID     multivitamin w/minerals  1 tablet Oral Daily     pantoprazole  40 mg Oral Daily     piperacillin-tazobactam  3.375 g Intravenous Q6H     polyethylene glycol  17 g Oral Daily     potassium chloride ER  20 mEq Oral QPM     potassium chloride ER  40 mEq Oral QAM     pregabalin  50 mg Oral BID     rOPINIRole  0.5 mg Oral BID     rOPINIRole  1 mg Oral At Bedtime     senna-docusate  1 tablet Oral BID     simvastatin  40 mg Oral At Bedtime     sodium chloride (PF)  3 mL Intracatheter Q8H     spironolactone  25 mg Oral Daily     vitamin B complex with vitamin C  1 tablet Oral  Daily     Vitamin D3  250 mcg Oral Daily

## 2022-04-04 ENCOUNTER — APPOINTMENT (OUTPATIENT)
Dept: NUCLEAR MEDICINE | Facility: CLINIC | Age: 77
DRG: 264 | End: 2022-04-04
Attending: INTERNAL MEDICINE
Payer: COMMERCIAL

## 2022-04-04 ENCOUNTER — APPOINTMENT (OUTPATIENT)
Dept: SPEECH THERAPY | Facility: CLINIC | Age: 77
DRG: 264 | End: 2022-04-04
Attending: PHYSICIAN ASSISTANT
Payer: COMMERCIAL

## 2022-04-04 LAB
ALBUMIN SERPL-MCNC: 1.9 G/DL (ref 3.4–5)
ALP SERPL-CCNC: 279 U/L (ref 40–150)
ALT SERPL W P-5'-P-CCNC: 31 U/L (ref 0–50)
ANION GAP SERPL CALCULATED.3IONS-SCNC: 3 MMOL/L (ref 3–14)
AST SERPL W P-5'-P-CCNC: 26 U/L (ref 0–45)
BILIRUB SERPL-MCNC: 0.4 MG/DL (ref 0.2–1.3)
BUN SERPL-MCNC: 32 MG/DL (ref 7–30)
C PNEUM DNA SPEC QL NAA+PROBE: NOT DETECTED
CALCIUM SERPL-MCNC: 9.4 MG/DL (ref 8.5–10.1)
CHLORIDE BLD-SCNC: 100 MMOL/L (ref 94–109)
CO2 SERPL-SCNC: 33 MMOL/L (ref 20–32)
CREAT SERPL-MCNC: 1.03 MG/DL (ref 0.52–1.04)
ERYTHROCYTE [DISTWIDTH] IN BLOOD BY AUTOMATED COUNT: 16 % (ref 10–15)
FLUAV H1 2009 PAND RNA SPEC QL NAA+PROBE: NOT DETECTED
FLUAV H1 RNA SPEC QL NAA+PROBE: NOT DETECTED
FLUAV H3 RNA SPEC QL NAA+PROBE: NOT DETECTED
FLUAV RNA SPEC QL NAA+PROBE: NOT DETECTED
FLUBV RNA SPEC QL NAA+PROBE: NOT DETECTED
GFR SERPL CREATININE-BSD FRML MDRD: 57 ML/MIN/1.73M2
GLUCOSE BLD-MCNC: 130 MG/DL (ref 70–99)
GLUCOSE BLDC GLUCOMTR-MCNC: 130 MG/DL (ref 70–99)
HADV DNA SPEC QL NAA+PROBE: NOT DETECTED
HBA1C MFR BLD: 5.4 % (ref 0–5.6)
HCOV PNL SPEC NAA+PROBE: NOT DETECTED
HCT VFR BLD AUTO: 24.4 % (ref 35–47)
HGB BLD-MCNC: 7.3 G/DL (ref 11.7–15.7)
HMPV RNA SPEC QL NAA+PROBE: NOT DETECTED
HPIV1 RNA SPEC QL NAA+PROBE: NOT DETECTED
HPIV2 RNA SPEC QL NAA+PROBE: NOT DETECTED
HPIV3 RNA SPEC QL NAA+PROBE: NOT DETECTED
HPIV4 RNA SPEC QL NAA+PROBE: NOT DETECTED
M PNEUMO DNA SPEC QL NAA+PROBE: NOT DETECTED
MCH RBC QN AUTO: 26.1 PG (ref 26.5–33)
MCHC RBC AUTO-ENTMCNC: 29.9 G/DL (ref 31.5–36.5)
MCV RBC AUTO: 87 FL (ref 78–100)
PLATELET # BLD AUTO: 395 10E3/UL (ref 150–450)
POTASSIUM BLD-SCNC: 3.2 MMOL/L (ref 3.4–5.3)
POTASSIUM BLD-SCNC: 3.6 MMOL/L (ref 3.4–5.3)
PROCALCITONIN SERPL-MCNC: 1.08 NG/ML
PROT SERPL-MCNC: 6.6 G/DL (ref 6.8–8.8)
RBC # BLD AUTO: 2.8 10E6/UL (ref 3.8–5.2)
RSV RNA SPEC QL NAA+PROBE: NOT DETECTED
RSV RNA SPEC QL NAA+PROBE: NOT DETECTED
RV+EV RNA SPEC QL NAA+PROBE: NOT DETECTED
SODIUM SERPL-SCNC: 136 MMOL/L (ref 133–144)
TROPONIN I SERPL HS-MCNC: 98 NG/L
UFH PPP CHRO-ACNC: 0.23 IU/ML
UFH PPP CHRO-ACNC: 0.28 IU/ML
UFH PPP CHRO-ACNC: <0.1 IU/ML
WBC # BLD AUTO: 30.1 10E3/UL (ref 4–11)

## 2022-04-04 PROCEDURE — 84132 ASSAY OF SERUM POTASSIUM: CPT | Performed by: STUDENT IN AN ORGANIZED HEALTH CARE EDUCATION/TRAINING PROGRAM

## 2022-04-04 PROCEDURE — 93017 CV STRESS TEST TRACING ONLY: CPT

## 2022-04-04 PROCEDURE — 250N000013 HC RX MED GY IP 250 OP 250 PS 637: Performed by: HOSPITALIST

## 2022-04-04 PROCEDURE — 94640 AIRWAY INHALATION TREATMENT: CPT

## 2022-04-04 PROCEDURE — 80053 COMPREHEN METABOLIC PANEL: CPT | Performed by: HOSPITALIST

## 2022-04-04 PROCEDURE — 250N000009 HC RX 250: Performed by: SURGERY

## 2022-04-04 PROCEDURE — 258N000003 HC RX IP 258 OP 636: Performed by: NURSE PRACTITIONER

## 2022-04-04 PROCEDURE — 36415 COLL VENOUS BLD VENIPUNCTURE: CPT | Performed by: STUDENT IN AN ORGANIZED HEALTH CARE EDUCATION/TRAINING PROGRAM

## 2022-04-04 PROCEDURE — 78452 HT MUSCLE IMAGE SPECT MULT: CPT

## 2022-04-04 PROCEDURE — 250N000011 HC RX IP 250 OP 636: Performed by: NURSE PRACTITIONER

## 2022-04-04 PROCEDURE — 94640 AIRWAY INHALATION TREATMENT: CPT | Mod: 76

## 2022-04-04 PROCEDURE — 99222 1ST HOSP IP/OBS MODERATE 55: CPT | Performed by: INTERNAL MEDICINE

## 2022-04-04 PROCEDURE — 250N000013 HC RX MED GY IP 250 OP 250 PS 637: Performed by: STUDENT IN AN ORGANIZED HEALTH CARE EDUCATION/TRAINING PROGRAM

## 2022-04-04 PROCEDURE — 85520 HEPARIN ASSAY: CPT | Performed by: PHYSICIAN ASSISTANT

## 2022-04-04 PROCEDURE — 250N000013 HC RX MED GY IP 250 OP 250 PS 637: Performed by: INTERNAL MEDICINE

## 2022-04-04 PROCEDURE — 36415 COLL VENOUS BLD VENIPUNCTURE: CPT | Performed by: HOSPITALIST

## 2022-04-04 PROCEDURE — 84145 PROCALCITONIN (PCT): CPT | Performed by: HOSPITALIST

## 2022-04-04 PROCEDURE — 250N000011 HC RX IP 250 OP 636: Performed by: HOSPITALIST

## 2022-04-04 PROCEDURE — 343N000001 HC RX 343: Performed by: STUDENT IN AN ORGANIZED HEALTH CARE EDUCATION/TRAINING PROGRAM

## 2022-04-04 PROCEDURE — 85520 HEPARIN ASSAY: CPT | Performed by: STUDENT IN AN ORGANIZED HEALTH CARE EDUCATION/TRAINING PROGRAM

## 2022-04-04 PROCEDURE — 83036 HEMOGLOBIN GLYCOSYLATED A1C: CPT | Performed by: INTERNAL MEDICINE

## 2022-04-04 PROCEDURE — 94660 CPAP INITIATION&MGMT: CPT

## 2022-04-04 PROCEDURE — 99233 SBSQ HOSP IP/OBS HIGH 50: CPT | Performed by: PHYSICIAN ASSISTANT

## 2022-04-04 PROCEDURE — 92610 EVALUATE SWALLOWING FUNCTION: CPT | Mod: GN | Performed by: SPEECH-LANGUAGE PATHOLOGIST

## 2022-04-04 PROCEDURE — 250N000013 HC RX MED GY IP 250 OP 250 PS 637: Performed by: SURGERY

## 2022-04-04 PROCEDURE — 93005 ELECTROCARDIOGRAM TRACING: CPT

## 2022-04-04 PROCEDURE — 92526 ORAL FUNCTION THERAPY: CPT | Mod: GN | Performed by: SPEECH-LANGUAGE PATHOLOGIST

## 2022-04-04 PROCEDURE — A9502 TC99M TETROFOSMIN: HCPCS | Performed by: STUDENT IN AN ORGANIZED HEALTH CARE EDUCATION/TRAINING PROGRAM

## 2022-04-04 PROCEDURE — 210N000001 HC R&B IMCU HEART CARE

## 2022-04-04 PROCEDURE — 85027 COMPLETE CBC AUTOMATED: CPT | Performed by: NURSE PRACTITIONER

## 2022-04-04 PROCEDURE — 999N000157 HC STATISTIC RCP TIME EA 10 MIN

## 2022-04-04 PROCEDURE — 36415 COLL VENOUS BLD VENIPUNCTURE: CPT | Performed by: PHYSICIAN ASSISTANT

## 2022-04-04 PROCEDURE — 84484 ASSAY OF TROPONIN QUANT: CPT | Performed by: HOSPITALIST

## 2022-04-04 RX ORDER — POTASSIUM CHLORIDE 1500 MG/1
40 TABLET, EXTENDED RELEASE ORAL ONCE
Status: COMPLETED | OUTPATIENT
Start: 2022-04-04 | End: 2022-04-04

## 2022-04-04 RX ORDER — FUROSEMIDE 20 MG
20 TABLET ORAL 2 TIMES DAILY
Status: DISCONTINUED | OUTPATIENT
Start: 2022-04-04 | End: 2022-04-06

## 2022-04-04 RX ADMIN — PANTOPRAZOLE SODIUM 40 MG: 40 TABLET, DELAYED RELEASE ORAL at 09:46

## 2022-04-04 RX ADMIN — POLYETHYLENE GLYCOL 3350 17 G: 17 POWDER, FOR SOLUTION ORAL at 09:42

## 2022-04-04 RX ADMIN — PIPERACILLIN AND TAZOBACTAM 3.38 G: 3; .375 INJECTION, POWDER, FOR SOLUTION INTRAVENOUS at 23:15

## 2022-04-04 RX ADMIN — IPRATROPIUM BROMIDE AND ALBUTEROL SULFATE 3 ML: .5; 3 SOLUTION RESPIRATORY (INHALATION) at 07:11

## 2022-04-04 RX ADMIN — ROPINIROLE HYDROCHLORIDE 1 MG: 0.5 TABLET, FILM COATED ORAL at 21:22

## 2022-04-04 RX ADMIN — Medication 31.8 MILLICURIE: at 13:30

## 2022-04-04 RX ADMIN — SIMVASTATIN 40 MG: 40 TABLET, FILM COATED ORAL at 21:22

## 2022-04-04 RX ADMIN — ACETAMINOPHEN 650 MG: 325 TABLET, FILM COATED ORAL at 16:35

## 2022-04-04 RX ADMIN — GUAIFENESIN 10 ML: 100 SOLUTION ORAL at 18:01

## 2022-04-04 RX ADMIN — MULTIPLE VITAMINS W/ MINERALS TAB 1 TABLET: TAB at 09:45

## 2022-04-04 RX ADMIN — GABAPENTIN 300 MG: 300 CAPSULE ORAL at 21:22

## 2022-04-04 RX ADMIN — BUPROPION HYDROCHLORIDE 150 MG: 150 TABLET, EXTENDED RELEASE ORAL at 09:44

## 2022-04-04 RX ADMIN — PIPERACILLIN AND TAZOBACTAM 3.38 G: 3; .375 INJECTION, POWDER, FOR SOLUTION INTRAVENOUS at 18:01

## 2022-04-04 RX ADMIN — GABAPENTIN 300 MG: 300 CAPSULE ORAL at 15:57

## 2022-04-04 RX ADMIN — IPRATROPIUM BROMIDE AND ALBUTEROL SULFATE 3 ML: .5; 3 SOLUTION RESPIRATORY (INHALATION) at 12:11

## 2022-04-04 RX ADMIN — SPIRONOLACTONE 25 MG: 25 TABLET ORAL at 09:46

## 2022-04-04 RX ADMIN — POTASSIUM CHLORIDE 40 MEQ: 1500 TABLET, EXTENDED RELEASE ORAL at 09:46

## 2022-04-04 RX ADMIN — ROPINIROLE HYDROCHLORIDE 0.5 MG: 0.5 TABLET, FILM COATED ORAL at 09:46

## 2022-04-04 RX ADMIN — IPRATROPIUM BROMIDE AND ALBUTEROL SULFATE 3 ML: .5; 3 SOLUTION RESPIRATORY (INHALATION) at 23:00

## 2022-04-04 RX ADMIN — ASPIRIN 81 MG CHEWABLE TABLET 81 MG: 81 TABLET CHEWABLE at 21:22

## 2022-04-04 RX ADMIN — CITALOPRAM HYDROBROMIDE 40 MG: 20 TABLET, FILM COATED ORAL at 09:45

## 2022-04-04 RX ADMIN — ROPINIROLE HYDROCHLORIDE 0.5 MG: 0.5 TABLET, FILM COATED ORAL at 13:46

## 2022-04-04 RX ADMIN — PIPERACILLIN AND TAZOBACTAM 3.38 G: 3; .375 INJECTION, POWDER, FOR SOLUTION INTRAVENOUS at 00:43

## 2022-04-04 RX ADMIN — PIPERACILLIN AND TAZOBACTAM 3.38 G: 3; .375 INJECTION, POWDER, FOR SOLUTION INTRAVENOUS at 12:37

## 2022-04-04 RX ADMIN — GUAIFENESIN 10 ML: 100 SOLUTION ORAL at 22:57

## 2022-04-04 RX ADMIN — HEPARIN SODIUM 1500 UNITS/HR: 1000 INJECTION INTRAVENOUS; SUBCUTANEOUS at 10:43

## 2022-04-04 RX ADMIN — POTASSIUM CHLORIDE 20 MEQ: 1500 TABLET, EXTENDED RELEASE ORAL at 21:22

## 2022-04-04 RX ADMIN — B-COMPLEX W/ C & FOLIC ACID TAB 1 TABLET: TAB at 09:45

## 2022-04-04 RX ADMIN — Medication 250 MCG: at 09:45

## 2022-04-04 RX ADMIN — CYANOCOBALAMIN TAB 1000 MCG 1000 MCG: 1000 TAB at 09:46

## 2022-04-04 RX ADMIN — GABAPENTIN 300 MG: 300 CAPSULE ORAL at 09:45

## 2022-04-04 RX ADMIN — PIPERACILLIN AND TAZOBACTAM 3.38 G: 3; .375 INJECTION, POWDER, FOR SOLUTION INTRAVENOUS at 07:01

## 2022-04-04 RX ADMIN — Medication 1 CAPSULE: at 09:46

## 2022-04-04 RX ADMIN — Medication 1 PACKET: at 10:34

## 2022-04-04 RX ADMIN — PREGABALIN 50 MG: 50 CAPSULE ORAL at 09:46

## 2022-04-04 RX ADMIN — FUROSEMIDE 20 MG: 20 TABLET ORAL at 15:57

## 2022-04-04 RX ADMIN — Medication 1 CAPSULE: at 21:22

## 2022-04-04 RX ADMIN — FERROUS SULFATE TAB 325 MG (65 MG ELEMENTAL FE) 325 MG: 325 (65 FE) TAB at 09:45

## 2022-04-04 RX ADMIN — ACETAMINOPHEN 650 MG: 325 TABLET, FILM COATED ORAL at 10:41

## 2022-04-04 RX ADMIN — PREGABALIN 50 MG: 50 CAPSULE ORAL at 21:22

## 2022-04-04 RX ADMIN — POTASSIUM CHLORIDE 40 MEQ: 1500 TABLET, EXTENDED RELEASE ORAL at 09:45

## 2022-04-04 ASSESSMENT — ACTIVITIES OF DAILY LIVING (ADL)
ADLS_ACUITY_SCORE: 13

## 2022-04-04 NOTE — PROVIDER NOTIFICATION
MD Notification    Notified Person: MD    Notified Person Name: Chelsi    Notification Date/Time: 2002 4/3/22    Notification Interaction: [employer]    Purpose of Notification: Pt afib RVR, bp stable, febrile tylenol administered, rates 130s-150s. Rate control prn? labs?*[id number]     Orders Received: EKG, K+, Mag ordered    Comments: BPA fired for sepsis, lactic also ordered per protocol

## 2022-04-04 NOTE — PROGRESS NOTES
Rapid called post dilt administration. Pt had a bradycardic event with HR as low as 26. Defibrillator outside the room, pads at the bedside. Pt symptomatic but conscious during event. BP remained stable. Dilt discontinued immediately. Provider at the bedside. Pt converted during event. Now nsr in the 70s. Pt states she is tired but is oriented at this time.

## 2022-04-04 NOTE — PROGRESS NOTES
BRIEF HOUSE OFFICER NOTE:    Atrial Fibrillation with RVR conversion to SR with multiple pauses  Patient had been initiated on a Cardizem drip due to A. fib with RVR by my colleague.  With the Cardizem infusing the patient subsequently had 3, 3-second pauses with conversion to sinus rhythm.  Nursing staff report the lowest heart rate that they thought was 26.  Rhythm strips reviewed just indicate pause and conversion to sinus rhythm with subsequent runs of atrial fib in between the family following her third pause she sustained in sinus rhythm.  At the time of my arrival she is in no acute distress and reports feeling improved.  Cardizem drip has been turned off.  Continue to monitor closely.    LAXMI Alcantara CNP  Text Page

## 2022-04-04 NOTE — PROGRESS NOTES
[facility]    Infectious Disease Progress Note    Date of Service: 04/04/2022     Assessment:  76YF with morbid obesity who has been hospitalized since 3/21 with venous ulcers, RLE cellulitis and is s/p excisional debridement of RLE ulcer with wound vac. Now with fever and marked leukocytosis of 30.5K likely related to aspiration pneumonia/pneumonitis , covid/influenza negative. She has no other focal complaints, no diarrhea to suggest C.diff, CT abdomen is non focal and wound appears stable    Recommendations  1. Discontinue Vancomycin  2. Treat with Zosyn  3. Follow WBC and clinical status  4. Sputum cx if able to expectorate    Lisette Chow MD    Interval History   Events noted, transferred to the cardiac floor due to atrial fibrillation with RVR. Developed bradycardic episodes on diltiazem. Had fever overnight, persistent leukocytosis.    Feels a little better, body pain improved, remains hypoxic requiring oxygen. CT chest noted    Physical Exam   Temp: 99.1  F (37.3  C) Temp src: Oral BP: (!) 144/71 Pulse: 74   Resp: 19 SpO2: 99 % O2 Device: Nasal cannula Oxygen Delivery: 6 LPM  Vitals:    04/01/22 1414 04/02/22 0443 04/04/22 0413   Weight: 128.7 kg (283 lb 11.7 oz) 130.8 kg (288 lb 5.8 oz) 130 kg (286 lb 9.6 oz)     Vital Signs with Ranges  Temp:  [97.2  F (36.2  C)-102.3  F (39.1  C)] 99.1  F (37.3  C)  Pulse:  [] 74  Resp:  [13-36] 19  BP: ()/(45-83) 144/71  SpO2:  [89 %-99 %] 99 %    Constitutional: Awake, alert, cooperative, no apparent distress  Lungs: coarse  Cardiovascular:S1 and S2, and no murmur noted  Abdomen: Normal bowel sounds, soft, non-distended, non-tender  Skin: No rash  MS : RLE in dressing    Other:    Medications     dilTIAZem HCl-Sodium Chloride       heparin 1,500 Units/hr (04/04/22 0701)     Reason anticoagulant not prescribed for atrial fibrillation       - MEDICATION INSTRUCTIONS -       - MEDICATION INSTRUCTIONS -         aspirin  81 mg Oral QPM      buPROPion  150 mg Oral QAM     citalopram  40 mg Oral QAM     cyanocobalamin  1,000 mcg Oral Daily     diosmin-hesperidin 450-50  1 capsule Oral BID     ferrous sulfate  325 mg Oral Daily     [Held by provider] furosemide  10 mg Oral BID     gabapentin  300 mg Oral TID     ipratropium - albuterol 0.5 mg/2.5 mg/3 mL  3 mL Nebulization Q4H While awake     Aleksandr  1 packet Oral BID     multivitamin w/minerals  1 tablet Oral Daily     pantoprazole  40 mg Oral Daily     piperacillin-tazobactam  3.375 g Intravenous Q6H     polyethylene glycol  17 g Oral Daily     potassium chloride ER  20 mEq Oral QPM     potassium chloride  40 mEq Oral Once     potassium chloride ER  40 mEq Oral QAM     pregabalin  50 mg Oral BID     rOPINIRole  0.5 mg Oral BID     rOPINIRole  1 mg Oral At Bedtime     senna-docusate  1 tablet Oral BID     simvastatin  40 mg Oral At Bedtime     sodium chloride (PF)  3 mL Intracatheter Q8H     spironolactone  25 mg Oral Daily     vancomycin  1,250 mg Intravenous Q24H     vitamin B complex with vitamin C  1 tablet Oral Daily     Vitamin D3  250 mcg Oral Daily       Data   All microbiology laboratory data reviewed.  Recent Labs   Lab Test 04/04/22  0523 04/03/22  1258 04/02/22  0719   WBC 30.1* 30.5* 20.0*   HGB 7.3* 8.2* 8.6*   HCT 24.4* 27.8* 29.4*   MCV 87 90 89    444 489*     Recent Labs   Lab Test 04/04/22  0523 04/03/22  1258 04/02/22  0719   CR 1.03 1.00 0.97     Recent Labs   Lab Test 10/20/21  0835   SED 55*     Component      Latest Ref Rng & Units 4/3/2022   MRSA Target DNA      Negative Negative   SA Target DNA       Negative     Imaging  3/21 CT chest PE /Abdomen pelvis  EXAM: CT CHEST PE ABDOMEN PELVIS W CONTRAST  LOCATION: [facility]  DATE/TIME: 4/3/2022 5:29 PM     INDICATION: PE suspected, low/intermediate probability, positive D-dimer. Abdominal Pain. Leukocytosis. Fever.  COMPARISON: Chest x-rays dated 04/02/2022 and CT pelvis dated 09/14/2019.  TECHNIQUE:  CT chest pulmonary angiogram and routine CT abdomen pelvis with IV contrast. Arterial phase through the chest and venous phase through the abdomen and pelvis. Multiplanar reformats and MIP reconstructions were performed. Dose reduction   techniques were used.   CONTRAST: 135 mL Isovue-[id number].     FINDINGS:  ANGIOGRAM CHEST: Pulmonary arteries are normal caliber and negative for pulmonary emboli. Thoracic aorta is negative for dissection. No CT evidence of right heart strain.      LUNGS AND PLEURA: Airspace infiltrates in all lobes of both lungs, worst in the lower lobes of the bilateral lower lungs with the largest most confluent consolidation in the left lower lung lobe and the next largest in the medial right lower lung lobe.   These areas could obscure nodularity. Lungs are otherwise grossly clear. No pneumothorax or significant right pleural fluid collection is identified. There is a small left pleural effusion.     MEDIASTINUM/AXILLAE: The heart is normal in size. There is a moderate to large size hiatal hernia containing most of the gastric cardia and fundus. No mediastinal, hilar, or axillary lymphadenopathy is identified although there are mildly prominent AP   window lymph nodes measuring up to 0.8 cm in short axis, pretracheal lymph nodes measuring up to 0.9 cm in short axis, subcarinal lymph node measuring up to 0.9 cm in short axis, right hilar lymph nodes measuring up to almost 1 cm in short axis and left   hilar lymph nodes are somewhat difficult to evaluate given the consolidation in the left lower lung lobe. Visualized portions of the thyroid are unremarkable. Thoracic aorta is of normal caliber. There are few atherosclerotic calcifications in the aorta.     CORONARY ARTERY CALCIFICATION: No significant coronary artery calcifications are identified.     HEPATOBILIARY: The liver enhances normally without focal lesion, biliary ductal dilatation or choledocholithiasis. Gallbladder is grossly normal in  appearance without evidence for cholelithiasis.     PANCREAS: Normal.     SPLEEN: Normal.     ADRENAL GLANDS: Normal.     KIDNEYS/BLADDER: Normal.     BOWEL: There are scattered diverticula in the sigmoid and descending colon without pericolonic inflammatory change to suggest acute diverticulitis. A moderate amount of stool is seen in the colon. Appendix is normal in appearance. Small bowel is of   normal caliber and appearance. The stomach contains a moderate amount of fluid and air. The gastric cardia and fundus is in the posterior mediastinum extending through a moderate to large size hiatal hernia.     LYMPH NODES: Normal.     VASCULATURE: There is mild nonaneurysmal atherosclerosis.     PELVIC ORGANS: Atrophic uterus is grossly unremarkable. Large calcified fibroid is again noted, similar to the prior CT pelvis dated 2019. Ovaries are not well seen. No adnexal masses are identified.     MUSCULOSKELETAL: There are degenerative changes in the spine, shoulders, hips, and right sacroiliac joint. No aggressive osseous lesions or acute osseous fractures are identified.     ADDITIONAL FINDINGS: No free intraperitoneal air or free fluid is identified. There is edema in the adipose tissues around the lateral aspects of the lower abdomen and around the anterior and lateral aspects of the pelvis consistent with mild anasarca.                                                                      IMPRESSION:  1.  Bilateral pulmonary infiltrates have an appearance most consistent with infectious etiology although could also be inflammatory. These are worst in the left lower lung lobe and next worse in the right lower lung lobe. Mildly prominent lymph nodes in   the mediastinum and hilar regions are likely reactive to the pulmonary processes. Follow-up CT chest is recommended after appropriate clinical treatment and resolution of the clinical symptoms to ensure resolution of the pulmonary and mediastinal   findings.  2.  No  evidence for pulmonary artery embolism.  3.  Moderate size hiatal hernia with extension of the gastric cardia and a portion of the gastric fundus in the posterior inferior mediastinum.  4.  Mild anasarca.  5.  No other etiology for patient's symptoms is identified.

## 2022-04-04 NOTE — CONSULTS
Cardiology Progress Note  [facility]          Assessment and Plan:   #1 Afib with RVR S/P spontaneous cardioversion  #2 Bradycardia with cardizem, resolved  #3 HTN  #4 Hyperlipidemia  #5 Demand ischemia  #6 Sepsis from LE wound, S/P wound vac application 3/21/22  #7 Persistent fevers  #8 Morbid obesity  #9 Hyperlipidemia  #10 DM II  #11 Volume overload (20 pounds up since admission)      It was a pleasure to be involved in the care of Ms. Kelley.  I reviewed her testing and discussed her history and symptoms in detail.  She had one short isolated episode of afib that resolved.  She is not currently anticoagulated. She had short bradycardic episode after diltiazem bolus, resolved. Not on any other AV sulema blocking agents.   When in afib with RVR she had ST depression and did have troponin leak.  She has no prior ischemic workup and no prior echo.   Multiple risk factors for CAD, including PAD, HTN, DMII, morbid obesity.     On asa and zocor. Would not start beta blocker at this time given recent low HR. Will check A1C.  Plan echo to assess EF and nuclear stress test for risk stratification.  If she has another episode of afib with RVR, would anticoagulate, and use amiodarone without bolus acutely rather than cardizem.    She is volume up by weights (20 lbs since admission).  Will increase lasix to 20mg BID for now while await echo.      Please do not hesitate to contact me with questions.    ASAD Zuñiga MD                History:   Elizabeth Kelley is a 76 year old female with morbid obesity, chronic venous ulcers, lymphedema, hyperlipidemia, and depression who underwent elective excisional debridement of RLE cicumferential venous hypertensive ulceration and application of wound vac on 3/21/22. Her hospitalization has been complicated by pneumonia/sepsis, recurrent fevers and encephalopathy.  Cardiology was asked to see her because she had an episode of afib with RVR  with subsequent bradycardia and cardioversion after cardizem was started.  She had ischemic changes on ECG when she was in afib with RVR and mild troponin leak.  She has had no cardiac evaluation for ischemia and no echo.  Multiple risk factors for CAD.    Lipids under control on zocor 40 with LDL 68 11/2021.  She had elevated A1C in the past but no recent check.  She is not active. No family history of CAD. Does not smoke. Is on a baby asa.            Past Medical History      I have reviewed this patient's medical history and updated it with pertinent information if needed.        Past Medical History:   Diagnosis Date     Ankle contracture, left       Class 3 severe obesity due to excess calories without serious comorbidity with body mass index (BMI) of 45.0 to 49.9 in adult (H)       Combined gastric and duodenal ulcer       Controlled substance agreement broken       Depression       Dyslipidemia       Edema       Edema       Gait abnormality       GERD (gastroesophageal reflux disease)       Gout       Lymphedema of both lower extremities       Melanoma (H)       left upper arm     MS (multiple sclerosis) (H)       RLS (restless legs syndrome)       Skin ulcer of left lower leg (H)       Valgus deformity of both feet       Vitamin D deficiency          Past Surgical History      I have reviewed this patient's surgical history and updated it with pertinent information if needed.        Past Surgical History:   Procedure Laterality Date     ABLATION SAPHENOUS VEIN W/ RFA Right 04/12/2021     Saphenous vein, anterior accessory saphenous vein, sclerotherapy of multiple veins.     COSMETIC SURGERY   1988     reduction of excess skin from weight loss     ESOPHAGOSCOPY, GASTROSCOPY, DUODENOSCOPY (EGD), COMBINED N/A 03/30/2015     Procedure: UPPER ENDOSCOPY with gastric biopsy;  Surgeon: Checo Fletchre MD;  Location: [facility];  Service:      IRRIGATION AND DEBRIDEMENT LOWER EXTREMITY, COMBINED Right  "3/21/2022     Procedure: RIGHT LEG WOUND DEBRIDEMENT, PAULIE DERMATONE, MISONIX, VAC VERA FLOW WITH VASHE;  Surgeon: Gabriele Bullock MD;  Location: [facility] OR     IRRIGATION AND DEBRIDEMENT LOWER EXTREMITY, COMBINED Right 3/28/2022     Procedure: DEBRIDEMENT AND WOUND DRESSING CHANGE AND MYRIAD APPLICATION OF RIGHT LOWER LEG WOUND;  Surgeon: Gabriele Bullock MD;  Location: [facility] OR     MAMMOPLASTY REDUCTION Bilateral       MOHS MICROGRAPHIC PROCEDURE         left upper arm, melanoma     PICC SINGLE LUMEN PLACEMENT   8/13/2021           PICC SINGLE LUMEN PLACEMENT   9/2/2021           SPINE SURGERY         L5 area surgery, decompression         Prior to Admission Medications      Prior to Admission Medications   Prescriptions Last Dose Informant Patient Reported? Taking?   Bioflavonoid Products (ICNDY-C) 500-550 MG TABS 3/20/2022 at PM Self No Yes   Sig: Take 1 tablet by mouth 2 times daily   HYDROcodone-acetaminophen (NORCO) 5-325 MG tablet Past Month at PRN Self Yes Yes   Sig: TAKE 1 TABLET BY MOUTH EVERY 8 HOURS AS NEEDED FOR 4 DAYS   Nutritional Supplements (VARICOSE VEINS FORMULA OR) 3/20/2022 at AM Self Yes Yes   Sig: Take 1 tablet by mouth every morning   Skin Protectants, Misc. (INTERDRY 10\"X36\") SHEE   Self No Yes   Sig: Externally apply 7 Units topically once as needed (change daily in groin rash/fold)   aspirin 81 mg chewable tablet 3/11/2022 at PM Self Yes Yes   Sig: Take 81 mg by mouth every evening    buPROPion (WELLBUTRIN SR) 150 MG 12 hr tablet 3/20/2022 at AM Self Yes Yes   Sig: Take 150 mg by mouth every morning    citalopram (CELEXA) 40 MG tablet 3/20/2022 at AM Self Yes Yes   Sig: Take 40 mg by mouth every morning    cyanocobalamin (VITAMIN B-12) 1000 MCG tablet 3/20/2022 at AM Self No Yes   Sig: Take 1 tablet (1,000 mcg) by mouth daily   furosemide (LASIX) 20 MG tablet 3/20/2022 at pm Self Yes Yes   Sig: Take 10 mg by mouth 2 times daily AM and afternoon (4PM)   multivitamin w/minerals (CENTRUM " ADULTS) tablet 3/20/2022 at AM Self Yes Yes   Sig: Take 1 tablet by mouth daily    naproxen sodium (ALEVE) 220 MG tablet 3/18/2022 at PRN Self Yes No   Sig: Take 440 mg by mouth daily as needed (220MG X 2 = 440MG)   pantoprazole (PROTONIX) 40 MG tablet 3/20/2022 at AM Self Yes Yes   Sig: [PANTOPRAZOLE (PROTONIX) 40 MG TABLET] Take 40 mg by mouth daily.   potassium chloride ER (KLOR-CON M) 20 MEQ CR tablet 3/20/2022 at PM Self Yes Yes   Sig: Take 20 mEq by mouth every evening (TAKE IN ADDITION TO AM/NOON DOSE FOR TOTAL 30mEq DAILY)   potassium chloride ER (KLOR-CON M) 20 MEQ CR tablet 3/20/2022 at am Self Yes No   Sig: Take 40 mEq by mouth every morning    pregabalin (LYRICA) 100 MG capsule 3/20/2022 at PM Self Yes Yes   Sig: Take 100 mg by mouth 2 times daily    rOPINIRole (REQUIP) 1 MG tablet 3/20/2022 at PM Self Yes Yes   Sig: Take 1 mg by mouth At Bedtime (TAKE IN ADDITION TO AM/NOON DOSE FOR TOTAL 2MG DAILY)   rOPINIRole (REQUIP) 1 MG tablet 3/20/2022 at 1200 Self Yes No   Sig: Take 0.5 mg by mouth 2 times daily IN THE MORNING AND AT NOON  (1MG X 0.5 = 0.5MG)  (TAKE IN ADDITION TO PM DOSE FOR TOTAL 2MG DAILY)   simvastatin (ZOCOR) 40 MG tablet 3/20/2022 at PM Self Yes Yes   Sig: [SIMVASTATIN (ZOCOR) 40 MG TABLET] Take 40 mg by mouth bedtime.   spironolactone (ALDACTONE) 25 MG tablet 3/20/2022 at AM Self Yes Yes   Sig: [SPIRONOLACTONE (ALDACTONE) 25 MG TABLET] Take 25 mg by mouth daily.   vitamin B-Complex 3/20/2022 at AM Self No Yes   Sig: Take 1 tablet by mouth daily   vitamin D3 (CHOLECALCIFEROL) 250 mcg (06278 units) capsule 3/20/2022 at AM Self No Yes   Sig: Take 1 capsule (250 mcg) by mouth daily      Facility-Administered Medications: None       Allergies            Allergies   Allergen Reactions     Other Environmental Allergy Anaphylaxis       Bees/Wasps Stings     Adhesive Tape Other (See Comments)       Red,itchy and oozes     Adhesive [Mecrylate] Unknown       Other reaction(s): sores     Percocet  [Oxycodone-Acetaminophen] Rash     Vicodin [Hydrocodone-Acetaminophen] Nausea and Vomiting and Hives         Immunization History          Immunization History   Administered Date(s) Administered     COVID-19,PF,Pfizer (12+ Yrs) 03/15/2021, 04/05/2021     FLUAD(HD)65+ QUAD 11/10/2021     Flu, Unspecified 09/23/2009, 09/19/2011, 12/11/2014, 08/30/2017     Influenza (IIV3) PF 09/23/2009, 09/19/2011, 12/11/2014     Influenza Vaccine IM > 6 months Valent IIV4 (Alfuria,Fluzone) 11/30/2016, 09/21/2020     Pneumo Conj 13-V (2010&after) 06/14/2018     Pneumococcal 23 valent 09/21/2020     TDAP Vaccine (Adacel) 10/14/2008          Social History      I have reviewed this patient's social history and updated it with pertinent information if needed. Elizabeth Kelley  reports that she quit smoking about 46 years ago. Her smoking use included cigarettes. She has a 3.00 pack-year smoking history. She has never used smokeless tobacco. She reports current alcohol use of about 1.0 standard drink of alcohol per week. She reports that she does not use drugs.      Family History      I have reviewed this patient's family history and updated it with pertinent information if needed.         Family History   Problem Relation Age of Onset     Uterine Cancer Mother       Hyperlipidemia Mother       Hypertension Mother       Multiple Sclerosis Mother       Asthma Sister       Obesity Sister       Hyperlipidemia Sister       Hypertension Sister       Multiple Sclerosis Sister       Arthritis Sister       Colon Cancer Paternal Aunt       Breast Cancer Paternal Aunt       Hypertension Paternal Aunt       Hyperlipidemia Paternal Aunt       Brain Cancer Father       Arthritis Maternal Uncle       Arthritis Maternal Grandmother       Early Death Maternal Grandfather       Arthritis Paternal Grandmother       Arthritis Paternal Grandfather          Review of Systems      The 10 point Review of Systems is negative other than noted in the HPI or here.  "           Medications:       aspirin  81 mg Oral QPM     buPROPion  150 mg Oral QAM     citalopram  40 mg Oral QAM     cyanocobalamin  1,000 mcg Oral Daily     diosmin-hesperidin 450-50  1 capsule Oral BID     ferrous sulfate  325 mg Oral Daily     furosemide  10 mg Oral BID     gabapentin  300 mg Oral TID     ipratropium - albuterol 0.5 mg/2.5 mg/3 mL  3 mL Nebulization Q4H While awake     Aleksandr  1 packet Oral BID     multivitamin w/minerals  1 tablet Oral Daily     pantoprazole  40 mg Oral Daily     piperacillin-tazobactam  3.375 g Intravenous Q6H     polyethylene glycol  17 g Oral Daily     potassium chloride ER  20 mEq Oral QPM     potassium chloride ER  40 mEq Oral QAM     pregabalin  50 mg Oral BID     rOPINIRole  0.5 mg Oral BID     rOPINIRole  1 mg Oral At Bedtime     senna-docusate  1 tablet Oral BID     simvastatin  40 mg Oral At Bedtime     sodium chloride (PF)  3 mL Intracatheter Q8H     spironolactone  25 mg Oral Daily     vitamin B complex with vitamin C  1 tablet Oral Daily     Vitamin D3  250 mcg Oral Daily     acetaminophen **OR** acetaminophen, albuterol, bisacodyl, calcium carbonate, artificial tears ophthalmic solution, guaiFENesin, HYDROmorphone **OR** HYDROmorphone, HYDROmorphone **OR** HYDROmorphone, InterDry 10\"x36\", lidocaine 4%, lidocaine, lidocaine (buffered or not buffered), magnesium hydroxide, naloxone **OR** naloxone **OR** naloxone **OR** naloxone, nitroGLYcerin, Reason anticoagulant not prescribed for atrial fibrillation, - MEDICATION INSTRUCTIONS -, ondansetron **OR** ondansetron, opium-belladonna, - MEDICATION INSTRUCTIONS -, prochlorperazine **OR** prochlorperazine, sodium chloride (PF)               Physical Exam:   Blood pressure 132/61, pulse 76, temperature 99.5  F (37.5  C), temperature source Temporal, resp. rate 21, height 1.549 m (5' 1\"), weight 130 kg (286 lb 9.6 oz), SpO2 96 %, not currently breastfeeding.  Wt Readings from Last 4 Encounters:   04/04/22 130 kg (286 lb " 9.6 oz)   03/10/22 120.6 kg (265 lb 12.8 oz)   22 118.1 kg (260 lb 6.4 oz)   22 114.3 kg (252 lb)         Vital Sign Ranges  Temperature Temp  Av.4  F (38  C)  Min: 97.2  F (36.2  C)  Max: 102.3  F (39.1  C)   Blood pressure Systolic (24hrs), Av , Min:88 , Max:151        Diastolic (24hrs), Av, Min:45, Max:83      Pulse Pulse  Av.9  Min: 72  Max: 152   Respirations Resp  Av.1  Min: 13  Max: 36   Pulse oximetry SpO2  Av.7 %  Min: 89 %  Max: 99 %         Intake/Output Summary (Last 24 hours) at 2022 1253  Last data filed at 2022 0952  Gross per 24 hour   Intake 120 ml   Output 1800 ml   Net -1680 ml       Constitutional: Awake, alert, cooperative, no apparent distress   Lungs: Clear to auscultation bilaterally, no crackles or wheezing   Cardiovascular: Regular rate and rhythm, normal S1 and S2, and no murmur noted   Abdomen: Normal bowel sounds, soft, non-distended, non-tender   Skin: No rashes, + edema   Other:           Data:     Recent Labs   Lab Test 22  0523 22  1258 22  0719   WBC 30.1* 30.5* 20.0*   HGB 7.3* 8.2* 8.6*   MCV 87 90 89    444 489*      Recent Labs   Lab Test 22  0523 22  0040 22  2317 222 22  1258 22  0719     --   --   --  136 135   POTASSIUM 3.2*  --   --  3.2* 3.4 4.0   CHLORIDE 100  --   --   --  100 100   CO2 33*  --   --   --  32 32   BUN 32*  --   --   --  31* 34*   CR 1.03  --   --   --  1.00 0.97   ANIONGAP 3  --   --   --  4 3   JUNI 9.4  --   --   --  9.2 9.3   * 130* 123*  --  127* 128*         Bhavya Zuñiga MD

## 2022-04-04 NOTE — PROGRESS NOTES
Shift Summary [id number]    Admitting Diagnosis: Chronic ulcer of right leg, with fat layer exposed (H) [L97.912]  Pain associated with wound [T14.8XXA, R52]  Ulcer of right lower extremity with fat layer exposed (H) [L97.912]   Vitals : multiple episodes of fever, hypoxia, very warm to touch.   Pain 4-6/10. Taking Dilaudid PRN    A&Ox4 but was drowsy in the mid-mornig. Became fully alert and awake at about 1pm.    Voiding : external cathether. PURWICK. Lasix IV for diuresis.   Mobility : Ax2 walker and gait belt.      CMS : EDEMA BLE. Isidoro BLE.   Lung Sounds diminished bases. Anterior with notable audible wheezing.  on 3-4 liters  via nasal cannula.   GI : normoactive bowel sounds. No BM today.   Dressing : dressing change performed by Wound Doctor this morning. The wound bed appears beefy red with sanguinous drainage. Patient experienced pain  During dressing change.     A series of lab work ordered by ID team. CT ordered as well. Please refer to labs and CT. Fever is still present.      Orders Placed This Encounter      Advance Diet as Tolerated: Regular Diet Adult

## 2022-04-04 NOTE — PROGRESS NOTES
Patient EKG result indicate A-fib RVR. Patient HR remains high 130-150s. Febrile, and constant cough with upper abdominal pain. Provider notified and ordered labs, and patient to be transferred to heart center for treatment. Patient transferred to room 64 to Heart CRT CCU with belongings. Pharmacy was notified.

## 2022-04-04 NOTE — PROGRESS NOTES
[facility]    Medicine Progress Note - Hospitalist Service    Date of Admission:  3/21/2022    Assessment & Plan        Elizabeth Kelley is a 76 year old female with PMHx MS, chronic BLE lymphedema with venous insufficiency and chronic R leg cellulitis, GERD, hyperlipidemia, and depression who underwent previously scheduled excisional debridement of RLE cicumferential venous hypertensive ulceration and application of wound vac on 3/21/22. Hospitalist service consulted 3/23/22 for AMS ultimately determined to have sepsis 2/2 CAP with respiratory failure. She had been progressing well, completing antibiotic regimen and weaned to room air with worsening systemic evidence of infection and hypoxia on 4/3, resumed on broad-spectrum antibiotics and imaging suggestive of bilateral pulmonary infiltrates. Also with new diagnosis of afib with RVR requiring IV medications, transition to CCU.      Sepsis secondary to CAP vs aspiration pneumonia  Acute hypoxic respiratory failure secondary to above, improved initially, but worse again now  Hx of MRSA  * 3/23 Tmax 101.5, tachycardic in the 110s, hypoxic to 86% on room air. WBC 18.3, procal 0.17. CXR with patchy airspace opacities in the left mid and lower lung, suspicious for pneumonia. Treated with Zosyn for CAP, repeat CXR 3/28 with new right upper lobe infiltrate and basilar infiltrate lateral views. Received diuretics for volume overload, transitioned to Augmentin on 3/30 with plans for 14 day course to treat LE cellulitis.  * 4/3 with fever to 102, uptrending WBC with mildly elevated procalcitonin. C/O SOB, noted to be hypoxic. COVID-19, RSV, Influenza PCR neg. UA neg. CXR on 4/2 neg. LE wound valuated by vascular surgery, no evidence of infection. CTA Chest neg for PE, with bilateral pulmonary infiltrates L>R, most consistent with infectious etiology. Blood cultures x2 repeated. Resumed IV Zosyn, Vanco. ID consulted.   * WBC 30.5--30.1, Procalcitonin  0.51--1.08  - Continues on Zosyn  - ID consulted, appreciate input  - Follow blood cultures, NGTD  - Sputum culture as able  - RCAT consulted, encourage pulmonary toilet with IS and acapella.  - Duonebs q4 hrs while awake, PRN albuterol nebs.  - Monitor fluid status.  - Resumed PTA Lasix and Aldactone.   - Robitussin prn.  - SLP re-consulted; evaluated on 3/24 without evidence of dysphagia however patient and daughter both report she had frequent coughing and difficulty swallowing at baseline - ?video swallow indicated. Greatly appreciate input.    Afib with RVR, new diagnosis  NSTEMI  Overnight 4/4 with RRT for development of afib with RVR, rates 130s-150s. In setting of sepsis. EKG with marked ST depression and TWI in lateral leads. Received IV diltiazem bolus with plan to transition to drip, developed three 3-second pauses with conversion to SR. Initiated on heparin drip. Remains in NSR this AM.  * Troponin 17--98  - TTE pending  - Cardiology consulted  - Consider discontinuing heparin drip pending cardiology input, TTE findings  - Tele     Encephalopathy, multifactorial- resolved  Altered mentation noted by nursing during hosptialization. Multifactorial in the setting of fever, narcotic pain medications, suspected underlying BOLIVAR, possible dehydration. Received dose of narcan, but did not significantly change pts mentation immediately, though seemed to improve within the hour after IVF bolus was near completion. Mental status continues to improved.  - Continue expectant management.  - Judicious pain med use.  - Bipap prn.  - Suspect underlying BOLIVAR, urged her to get evaluated as outpatient.  - Resumed PTA Wellbutrin, Lyrica and Requip as per the patient's request but will closely monitor mentation, especially if she will get pain medications; low threshold to hold these meds if she becomes confused/lethargic.  Decreased PTA lyrica on 4/2/22 due to developing mental status changes, would consider tapering off and  just using gabapentin. May need to hold/decrease other meds as well pending clinical course.     Acute blood loss anemia  Chronic normocytic anemia  Baseline 10-11. Down to 8.3 post-procedure. No active bleeding. Could be dilutional as checked while receiving IVF bolus   3/24 - Hb down to 6.1 but repeated Hb 7.1, again down to 6.4. Transfused 1 unit PRBCs - 3/25/22. Transfused another unit PRBC on 3/27/22 for hemoglobin 6.7.  *Hgb 7.3 (8.2)  - Discontinued scheduled Celebrex.  - Iron studies suggestive of iron deficiency, started on oral iron supplement.  - CBC in AM     Hematuria, resolved  Noted in AM on 3/30/22. Likely 2/2 trauma/irritation from garrison catheter. Has been on empiric Abx, UA 4/1 neg. Resolved as of 3/31. Garrison removed 4/1.     Chronic BLE lymphedema with venous insufficiency and chronic R leg cellulitis   S/P excisional debridement of RLE cicumferential venous hypertensive ulceration and application of wound vac (3/21/22)  S/P intraoperative wound examination, wound debridement, and application of myriad 2 layer graft on 3/28  - Defer routine post-operative cares including wound cares to Dr. Bullock.  - Judicious pain control.  - Gabapentin 300 mg po TID.  - PTA Lyrica reduced to 50mg BID as noted above (PTA 100mg BID)  - Continue PTA lasix, aldactone, and potassium supplement.     Severe Obesity, BMI 50.81  - Follow up with PCP.      MS  Ambulatory at baseline, though some weakness in lower extremities and intermittent upper extremity weakness.     GERD  - Continue PTA Protonix.     Hyperlipidemia  - Continue PTA simvastatin.     Depression  Resumed PTA Celexa, Wellbutrin and Lyrica on 3/26/22. Had been held for a few days given AMS, resumed when mentation back to baseline.     RLS  - Continue PTA Requip.       Diet: Advance Diet as Tolerated: Regular Diet Adult    DVT Prophylaxis: Heparin  Garrison Catheter: Not present  Central Lines: None  Cardiac Monitoring: ACTIVE order. Indication: AMI (NSTEMI/  STEMI) (48 hours)  Code Status: Full Code      Disposition Plan   Expected Discharge: 04/06/2022     Anticipated discharge location: home with family    Delays:     Other (Add Comment)            The patient's care was discussed with the Attending Physician, Dr. Zacarias, Bedside Nurse, Patient and Patient's Family.    Avel Silverio PA-C  Hospitalist Service  [facility]  Securely message with the Vocera Web Console (learn more here)  Text page via [employer] Paging/Directory         Clinically Significant Risk Factors Present on Admission                    ______________________________________________________________________    Interval History   Pt resting in chair, daughter at bedside. Daughter notes yesterday patient was c/o epigastric/diaphragm irritation which she attributed to coughing and reflux, is improved today. Denies cp, sob, lightheadedness. She denies symptoms while being in afib. Was hopeful to discharge over weekend, is discouraged of events requiring her to remain in the hospital. Both patient and daughter note she has difficulty swallowing with frequent coughing at baseline, agreeable to repeat speech evaluation.     Data reviewed today: I reviewed all medications, new labs and imaging results over the last 24 hours. I personally reviewed the EKG tracing showing resolution of ischemic changes.    Physical Exam   Vital Signs: Temp: 99.5  F (37.5  C) Temp src: Temporal BP: 129/72 Pulse: 80   Resp: 14 SpO2: 93 % O2 Device: Nasal cannula Oxygen Delivery: 3 LPM  Weight: 286 lbs 9.57 oz  GEN: well-developed, well-nourished, appears comfortable  HEENT: NCAT, EOM intact bilaterally, sclera clear, conjunctiva normal, nose & mouth patent, mucous membranes moist  CHEST: lungs diminished bilatreally, no increased work of breathing, no wheeze  HEART: RRR, S1 & S2, no murmur  ABD: soft, nontender, obese, no guarding or rigidity, +BS in all 4 quadrants  MSK: AROM bilateral UE/LE  NEURO:  awake, alert, oriented to name, place, and time. CN II-XII grossly intact. Sensation grossly intact to light touch.   SKIN: warm & dry without rash, right lower leg with saturated dressing in place, no warmth or evidence of expanding erythema    Data   Recent Labs   Lab 04/04/22  0523 04/04/22  0040 04/03/22  2317 04/03/22  2122 04/03/22  1258 04/02/22  0719   WBC 30.1*  --   --   --  30.5* 20.0*   HGB 7.3*  --   --   --  8.2* 8.6*   MCV 87  --   --   --  90 89     --   --   --  444 489*     --   --   --  136 135   POTASSIUM 3.2*  --   --  3.2* 3.4 4.0   CHLORIDE 100  --   --   --  100 100   CO2 33*  --   --   --  32 32   BUN 32*  --   --   --  31* 34*   CR 1.03  --   --   --  1.00 0.97   ANIONGAP 3  --   --   --  4 3   JUNI 9.4  --   --   --  9.2 9.3   * 130* 123*  --  127* 128*   ALBUMIN 1.9*  --   --   --   --   --    PROTTOTAL 6.6*  --   --   --   --   --    BILITOTAL 0.4  --   --   --   --   --    ALKPHOS 279*  --   --   --   --   --    ALT 31  --   --   --   --   --    AST 26  --   --   --   --   --      Recent Results (from the past 24 hour(s))   CT Chest (PE) Abdomen Pelvis w Contrast    Narrative    EXAM: CT CHEST PE ABDOMEN PELVIS W CONTRAST  LOCATION: [facility]  DATE/TIME: 4/3/2022 5:29 PM    INDICATION: PE suspected, low/intermediate probability, positive D-dimer. Abdominal Pain. Leukocytosis. Fever.  COMPARISON: Chest x-rays dated 04/02/2022 and CT pelvis dated 09/14/2019.  TECHNIQUE: CT chest pulmonary angiogram and routine CT abdomen pelvis with IV contrast. Arterial phase through the chest and venous phase through the abdomen and pelvis. Multiplanar reformats and MIP reconstructions were performed. Dose reduction   techniques were used.   CONTRAST: 135 mL Isovue-370.    FINDINGS:  ANGIOGRAM CHEST: Pulmonary arteries are normal caliber and negative for pulmonary emboli. Thoracic aorta is negative for dissection. No CT evidence of right heart strain.      LUNGS AND PLEURA: Airspace infiltrates in all lobes of both lungs, worst in the lower lobes of the bilateral lower lungs with the largest most confluent consolidation in the left lower lung lobe and the next largest in the medial right lower lung lobe.   These areas could obscure nodularity. Lungs are otherwise grossly clear. No pneumothorax or significant right pleural fluid collection is identified. There is a small left pleural effusion.    MEDIASTINUM/AXILLAE: The heart is normal in size. There is a moderate to large size hiatal hernia containing most of the gastric cardia and fundus. No mediastinal, hilar, or axillary lymphadenopathy is identified although there are mildly prominent AP   window lymph nodes measuring up to 0.8 cm in short axis, pretracheal lymph nodes measuring up to 0.9 cm in short axis, subcarinal lymph node measuring up to 0.9 cm in short axis, right hilar lymph nodes measuring up to almost 1 cm in short axis and left   hilar lymph nodes are somewhat difficult to evaluate given the consolidation in the left lower lung lobe. Visualized portions of the thyroid are unremarkable. Thoracic aorta is of normal caliber. There are few atherosclerotic calcifications in the aorta.    CORONARY ARTERY CALCIFICATION: No significant coronary artery calcifications are identified.    HEPATOBILIARY: The liver enhances normally without focal lesion, biliary ductal dilatation or choledocholithiasis. Gallbladder is grossly normal in appearance without evidence for cholelithiasis.    PANCREAS: Normal.    SPLEEN: Normal.    ADRENAL GLANDS: Normal.    KIDNEYS/BLADDER: Normal.    BOWEL: There are scattered diverticula in the sigmoid and descending colon without pericolonic inflammatory change to suggest acute diverticulitis. A moderate amount of stool is seen in the colon. Appendix is normal in appearance. Small bowel is of   normal caliber and appearance. The stomach contains a moderate amount of fluid and air.  The gastric cardia and fundus is in the posterior mediastinum extending through a moderate to large size hiatal hernia.    LYMPH NODES: Normal.    VASCULATURE: There is mild nonaneurysmal atherosclerosis.    PELVIC ORGANS: Atrophic uterus is grossly unremarkable. Large calcified fibroid is again noted, similar to the prior CT pelvis dated 2019. Ovaries are not well seen. No adnexal masses are identified.    MUSCULOSKELETAL: There are degenerative changes in the spine, shoulders, hips, and right sacroiliac joint. No aggressive osseous lesions or acute osseous fractures are identified.    ADDITIONAL FINDINGS: No free intraperitoneal air or free fluid is identified. There is edema in the adipose tissues around the lateral aspects of the lower abdomen and around the anterior and lateral aspects of the pelvis consistent with mild anasarca.      Impression    IMPRESSION:  1.  Bilateral pulmonary infiltrates have an appearance most consistent with infectious etiology although could also be inflammatory. These are worst in the left lower lung lobe and next worse in the right lower lung lobe. Mildly prominent lymph nodes in   the mediastinum and hilar regions are likely reactive to the pulmonary processes. Follow-up CT chest is recommended after appropriate clinical treatment and resolution of the clinical symptoms to ensure resolution of the pulmonary and mediastinal   findings.  2.  No evidence for pulmonary artery embolism.  3.  Moderate size hiatal hernia with extension of the gastric cardia and a portion of the gastric fundus in the posterior inferior mediastinum.  4.  Mild anasarca.  5.  No other etiology for patient's symptoms is identified.     Medications     heparin 1,500 Units/hr (04/04/22 1043)     Reason anticoagulant not prescribed for atrial fibrillation       - MEDICATION INSTRUCTIONS -       - MEDICATION INSTRUCTIONS -         aspirin  81 mg Oral QPM     buPROPion  150 mg Oral QAM     citalopram  40 mg Oral  QAM     cyanocobalamin  1,000 mcg Oral Daily     diosmin-hesperidin 450-50  1 capsule Oral BID     ferrous sulfate  325 mg Oral Daily     furosemide  10 mg Oral BID     gabapentin  300 mg Oral TID     ipratropium - albuterol 0.5 mg/2.5 mg/3 mL  3 mL Nebulization Q4H While awake     Aleksandr  1 packet Oral BID     multivitamin w/minerals  1 tablet Oral Daily     pantoprazole  40 mg Oral Daily     piperacillin-tazobactam  3.375 g Intravenous Q6H     polyethylene glycol  17 g Oral Daily     potassium chloride ER  20 mEq Oral QPM     potassium chloride ER  40 mEq Oral QAM     pregabalin  50 mg Oral BID     rOPINIRole  0.5 mg Oral BID     rOPINIRole  1 mg Oral At Bedtime     senna-docusate  1 tablet Oral BID     simvastatin  40 mg Oral At Bedtime     sodium chloride (PF)  3 mL Intracatheter Q8H     spironolactone  25 mg Oral Daily     vitamin B complex with vitamin C  1 tablet Oral Daily     Vitamin D3  250 mcg Oral Daily

## 2022-04-04 NOTE — PROGRESS NOTES
04/04/22 6234   General Information   Onset of Illness/Injury or Date of Surgery 03/22/22   Referring Physician Avel Silverio PA-C   Patient/Family Therapy Goal Statement (SLP) Likes her grapes.   Pertinent History of Current Problem Elizabeth Kelley is a 76 year old female with PMHx MS, chronic BLE lymphedema with venous insufficiency and chronic R leg cellulitis, GERD, hyperlipidemia, and depression who underwent previously scheduled excisional debridement of RLE cicumferential venous hypertensive ulceration and application of wound vac on 3/21/22. Hospitalist service consulted 3/23/22 for AMS ultimately determined to have sepsis 2/2 CAP with respiratory failure. She had been progressing well, completing antibiotic regimen and weaned to room air with worsening systemic evidence of infection and hypoxia on 4/3, resumed on broad-spectrum antibiotics and imaging suggestive of bilateral pulmonary infiltrates. Also with new diagnosis of afib with RVR requiring IV medications, transition to CCU.    Past History of Dysphagia Last seen for an evaluation on 3/24/22 without dysphagia eval only at that time.,   Type of Evaluation   Type of Evaluation Swallow Evaluation   Oral Motor   Oral Musculature generally intact   Structural Abnormalities none present   Mucosal Quality adequate   Dentition (Oral Motor)   Dentition (Oral Motor) natural dentition;adequate dentition   Facial Symmetry (Oral Motor)   Facial Symmetry (Oral Motor) WNL   Lip Function (Oral Motor)   Lip Range of Motion (Oral Motor) WNL   Tongue Function (Oral Motor)   Tongue Strength (Oral Motor) strength decreased;minimal impairment   Tongue Coordination/Speed (Oral Motor) slows down progressively   Comment, Tongue Function (Oral Motor) ROM is good decreased coordination and speed noted.    Jaw Function (Oral Motor)   Jaw Function (Oral Motor) WNL   Cough/Swallow/Gag Reflex (Oral Motor)   Soft Palate/Velum (Oral Motor) WNL   Volitional Throat Clear/Cough (Oral  Motor) WNL   Vocal Quality/Secretion Management (Oral Motor)   Vocal Quality (Oral Motor) WFL   General Swallowing Observations   Current Diet/Method of Nutritional Intake (General Swallowing Observations, NIS) thin liquids (level 0);regular diet   Respiratory Support (General Swallowing Observations) supplemental oxygen;nasal cannula   Swallowing Evaluation Clinical swallow evaluation   Clinical Swallow Evaluation   Feeding Assistance set up only required   Clinical Swallow Evaluation Textures Trialed thin liquids;pureed;solid foods   Clinical Swallow Eval: Thin Liquid Texture Trial   Mode of Presentation, Thin Liquids cup;straw;self-fed   Volume of Liquid or Food Presented 4 oz of water   Oral Phase of Swallow premature pharyngeal entry   Pharyngeal Phase of Swallow impaired;repeated swallows;throat clearing   Diagnostic Statement Suspect penetration via the straw due to throat clearing. Tolerated via the cup.    Clinical Swallow Evaluation: Puree Solid Texture Trial   Mode of Presentation, Puree spoon;self-fed   Volume of Puree Presented 3 oz of pudding   Oral Phase, Puree WFL   Pharyngeal Phase, Puree intact   Clinical Swallow Evaluation: Solid Food Texture Trial   Mode of Presentation self-fed   Volume Presented 1 cracker and grapes   Oral Phase delayed AP movement;residue in oral cavity   Pharyngeal Phase impaired;reduction in laryngeal movement;repeated swallows   Diagnostic Statement Mildly delayed AP movement and minimal to mild oral residue that cleared with an additional swallow or liquid rinse.    Esophageal Phase of Swallow   Patient reports or presents with symptoms of esophageal dysphagia Yes   Esophageal comments She reports foods being stuck mainly in her throat but suspect esophageal involvment due to moderate/large hiatal hernia on CT scan.    Swallowing Recommendations   Diet Consistency Recommendations thin liquids (level 0);regular diet   Supervision Level for Intake distant supervision needed    Mode of Delivery Recommendations bolus size, small;food moistened;no straws;slow rate of intake   Swallowing Maneuver Recommendations alternate food and liquid intake;extra swallow   Recommended Feeding/Eating Techniques (Swallow Eval) maintain upright sitting position for eating;maintain upright posture during/after eating for 30 minutes;set-up and prepare tray   Medication Administration Recommendations, Swallowing (SLP) As tolerated.    Instrumental Assessment Recommendations VFSS (videofluoroscopic swallowing study)   Comment, Swallowing Recommendations Will complete a video swallow study to rule out silent aspiration and determine least restrictive diet given pneumonia.    General Therapy Interventions   Planned Therapy Interventions Dysphagia Treatment   Dysphagia treatment Instruction of safe swallow strategies   Clinical Impression   Criteria for Skilled Therapeutic Interventions Met (SLP Eval) Yes, treatment indicated   SLP Diagnosis Mild dysphagia   Risks & Benefits of therapy have been explained evaluation/treatment results reviewed;care plan/treatment goals reviewed;risks/benefits reviewed;current/potential barriers reviewed;participants voiced agreement with care plan;participants included;patient;daughter   Clinical Impression Comments Patient presents with mild oral and pharyngeal dysphagia at bedside characterized by premature entry of thin liquids via the straw and delayed throat clearing. Improved tolerance via the cup without Sx of aspiration. Mastication was mildly prolonged for a solid with minimal oral residue that cleared with a liquid rinse. Patient reports that food at times will stick in her throat. Question if the sensation could be esophageal due to moderate/large hiatal hernia. Given her pneumonia has not cleared will complete a video swallow study to fully assess pharyngeal function and rule out silent aspiration. Patient and daughter are in agreement with the plan.    SLP Discharge  Planning   SLP Discharge Recommendation Transitional Care Facility   SLP Rationale for DC Rec Patient is below her baseline swallow function.    SLP Brief overview of current status  Mild dysphagia will complete video to fully assess pharyngeal function.     Total Evaluation Time   Total Evaluation Time (Minutes) 15   SLP Goals   Therapy Frequency (SLP Eval) 5 times/wk   SLP Predicated Duration/Target Date for Goal Attainment 04/11/22   SLP Goals Swallow   SLP: Safely tolerate diet without signs/symptoms of aspiration Regular diet;Thin liquids;With use of swallow precautions;With assistance/supervision

## 2022-04-04 NOTE — PROGRESS NOTES
CCU for afib evaluation.  Right leg dressing changed:  Confirmed orders; moist Vashe kerlex dressing changes TID, continue until f/u in clinic  Plan discontinue home when medically stable and follow up in wound clinic, planned STSG when wound bed prepped  Ara

## 2022-04-04 NOTE — PROGRESS NOTES
"CROSS COVER    Paged by RN for \"atrial fibrillation with RVR\" with rates in the 130's to 150's and fever with recent tylenol administration.     Patient is a 77 yo female with a complex PMH who was admitted on 3/21 for sepsis 2/2 PNA and acute hypoxic respiratory failure. She has been febrile since admission. She has had tachycardia. Prior ECG showing SR with SA on 3/23. Noted fever earlier today of 102. Attending obtained BC and changed antibiotic regimen to Zosyn. No past history of atrial fibrillation. Current vitals showing temp  Of 101.2, HR of 136, BP of 151/76 and 95% sats on 3 liters nasal cannula.   -ECG confirming afib with RVR  -Concerns for ischemia with marked ST depression and T-wave inversion in lateral leads   -Diltiazem bolus and gtt to keep HR <120  -Mag and K+ pending  -Replete lytes as needed  -Order to transfer to CCU IMC      Rapid Response:  Rapid response called by nursing after orders and transfer in place. RRT called for loss of IV access and transport to CCU. I have seen and examined the patient. She is resting in bed. Anxious at times. Denies any chest pain, pressure or SOB. No pain the the left arm or jaw. She has lost IV access and is a difficult start. Nursing unable to get into contact with IV start team to attempt. Discussed afib, ECG, heparin, diltiazem, labs and need for echo and transfer with the patient. She is agreeable.       General: Alert and oriented. No acute distress. Anxious at times.  Cardiac: Irregularly, irregular rate and rhythm. Afib with RVR. Pulses 2+. No murmurs, rubs or gallops.  Respiratory: Non-labored respiratory rate and effort. On nasal cannula. Diminished in the bilateral bases.   GI: Abdomen soft and non-distended, non-tender  Extremities: Dressing to RLE with oozing noted. Extremities are warm and well perfused.   Neuro/Psych: Alert. CN II-XII grossly intact. Sensation intact. Anxious at times.      Afib with RVR--New onset  New afib in the setting of " sepsis. No history of afib in the past. Rates in the 130's to 150's with SBP >130. Asymptomatic. No dizziness or chest pain. Lactate normal. BNP normal. TSH normal.   -Transfer to CCU IMC  -Start dilt bolus with gtt when IV access re-established; may need to consider PICC in AM as difficult access  -Replace K+--3.2 and Mg at 2.2  -Telemetry monitoring  -Goal HR <120  -Heparin gtt  -Monitor Hgb  -Cardiology consult; appreciate recommendations    ST Changes on ECG  Concerns for ischemia. Troponin normal at 17. ECG showing afib with ST depression and T-wave inversion in lateral leads. Discussed with Dr. Li, Cardiology. No changes to plan as noted above.   -Echo in AM  -ECG in AM  -Cards cx  -Trend traes    Catherine Gagnon NP  [facility]  Securely message with the Vocera Web Console (learn more here)  Text page via [facility] Paging/Directory

## 2022-04-05 ENCOUNTER — APPOINTMENT (OUTPATIENT)
Dept: SPEECH THERAPY | Facility: CLINIC | Age: 77
DRG: 264 | End: 2022-04-05
Attending: SURGERY
Payer: COMMERCIAL

## 2022-04-05 ENCOUNTER — APPOINTMENT (OUTPATIENT)
Dept: NUCLEAR MEDICINE | Facility: CLINIC | Age: 77
DRG: 264 | End: 2022-04-05
Attending: INTERNAL MEDICINE
Payer: COMMERCIAL

## 2022-04-05 ENCOUNTER — APPOINTMENT (OUTPATIENT)
Dept: CARDIOLOGY | Facility: CLINIC | Age: 77
DRG: 264 | End: 2022-04-05
Attending: INTERNAL MEDICINE
Payer: COMMERCIAL

## 2022-04-05 ENCOUNTER — APPOINTMENT (OUTPATIENT)
Dept: GENERAL RADIOLOGY | Facility: CLINIC | Age: 77
DRG: 264 | End: 2022-04-05
Attending: PHYSICIAN ASSISTANT
Payer: COMMERCIAL

## 2022-04-05 LAB
ANION GAP SERPL CALCULATED.3IONS-SCNC: 7 MMOL/L (ref 3–14)
BASOPHILS # BLD AUTO: 0.1 10E3/UL (ref 0–0.2)
BASOPHILS NFR BLD AUTO: 0 %
BUN SERPL-MCNC: 27 MG/DL (ref 7–30)
CALCIUM SERPL-MCNC: 9.1 MG/DL (ref 8.5–10.1)
CHLORIDE BLD-SCNC: 101 MMOL/L (ref 94–109)
CO2 SERPL-SCNC: 29 MMOL/L (ref 20–32)
CREAT SERPL-MCNC: 0.94 MG/DL (ref 0.52–1.04)
CREAT SERPL-MCNC: 1.02 MG/DL (ref 0.52–1.04)
CV STRESS MAX HR HE: 90
EOSINOPHIL # BLD AUTO: 0.3 10E3/UL (ref 0–0.7)
EOSINOPHIL NFR BLD AUTO: 1 %
ERYTHROCYTE [DISTWIDTH] IN BLOOD BY AUTOMATED COUNT: 15.9 % (ref 10–15)
GFR SERPL CREATININE-BSD FRML MDRD: 57 ML/MIN/1.73M2
GFR SERPL CREATININE-BSD FRML MDRD: 63 ML/MIN/1.73M2
GLUCOSE BLD-MCNC: 115 MG/DL (ref 70–99)
HCT VFR BLD AUTO: 26.5 % (ref 35–47)
HGB BLD-MCNC: 7.7 G/DL (ref 11.7–15.7)
IMM GRANULOCYTES # BLD: 0.3 10E3/UL
IMM GRANULOCYTES NFR BLD: 1 %
LVEF ECHO: NORMAL
LYMPHOCYTES # BLD AUTO: 1.2 10E3/UL (ref 0.8–5.3)
LYMPHOCYTES NFR BLD AUTO: 5 %
MCH RBC QN AUTO: 26.1 PG (ref 26.5–33)
MCHC RBC AUTO-ENTMCNC: 29.1 G/DL (ref 31.5–36.5)
MCV RBC AUTO: 90 FL (ref 78–100)
MONOCYTES # BLD AUTO: 1.7 10E3/UL (ref 0–1.3)
MONOCYTES NFR BLD AUTO: 8 %
NEUTROPHILS # BLD AUTO: 18.6 10E3/UL (ref 1.6–8.3)
NEUTROPHILS NFR BLD AUTO: 85 %
NRBC # BLD AUTO: 0 10E3/UL
NRBC BLD AUTO-RTO: 0 /100
PLATELET # BLD AUTO: 385 10E3/UL (ref 150–450)
POTASSIUM BLD-SCNC: 4.1 MMOL/L (ref 3.4–5.3)
POTASSIUM BLD-SCNC: 4.2 MMOL/L (ref 3.4–5.3)
RATE PRESSURE PRODUCT: 9900
RBC # BLD AUTO: 2.95 10E6/UL (ref 3.8–5.2)
SODIUM SERPL-SCNC: 137 MMOL/L (ref 133–144)
STRESS ECHO BASELINE DIASTOLIC HE: 67
STRESS ECHO BASELINE HR: 81 BPM
STRESS ECHO BASELINE SYSTOLIC BP: 111
STRESS ECHO CALCULATED PERCENT HR: 62 %
STRESS ECHO LAST STRESS DIASTOLIC BP: 49
STRESS ECHO LAST STRESS SYSTOLIC BP: 110
STRESS ECHO TARGET HR: 146
STRESS/REST PERFUSION RATIO: 0.96
UFH PPP CHRO-ACNC: 0.23 IU/ML
UFH PPP CHRO-ACNC: 0.43 IU/ML
WBC # BLD AUTO: 22.1 10E3/UL (ref 4–11)

## 2022-04-05 PROCEDURE — 250N000009 HC RX 250: Performed by: SURGERY

## 2022-04-05 PROCEDURE — 250N000013 HC RX MED GY IP 250 OP 250 PS 637: Performed by: SURGERY

## 2022-04-05 PROCEDURE — 999N000049 HC STATISTIC ECHO STRESS OR NM NPI

## 2022-04-05 PROCEDURE — 93018 CV STRESS TEST I&R ONLY: CPT | Performed by: INTERNAL MEDICINE

## 2022-04-05 PROCEDURE — 94640 AIRWAY INHALATION TREATMENT: CPT

## 2022-04-05 PROCEDURE — 999N000208 ECHOCARDIOGRAM COMPLETE

## 2022-04-05 PROCEDURE — 94640 AIRWAY INHALATION TREATMENT: CPT | Mod: 76

## 2022-04-05 PROCEDURE — 343N000001 HC RX 343: Performed by: STUDENT IN AN ORGANIZED HEALTH CARE EDUCATION/TRAINING PROGRAM

## 2022-04-05 PROCEDURE — 80048 BASIC METABOLIC PNL TOTAL CA: CPT | Performed by: SURGERY

## 2022-04-05 PROCEDURE — 250N000011 HC RX IP 250 OP 636: Performed by: SPECIALIST

## 2022-04-05 PROCEDURE — 250N000013 HC RX MED GY IP 250 OP 250 PS 637: Performed by: INTERNAL MEDICINE

## 2022-04-05 PROCEDURE — 36415 COLL VENOUS BLD VENIPUNCTURE: CPT | Performed by: SURGERY

## 2022-04-05 PROCEDURE — 999N000157 HC STATISTIC RCP TIME EA 10 MIN

## 2022-04-05 PROCEDURE — 82565 ASSAY OF CREATININE: CPT | Performed by: PHYSICIAN ASSISTANT

## 2022-04-05 PROCEDURE — 74230 X-RAY XM SWLNG FUNCJ C+: CPT

## 2022-04-05 PROCEDURE — 255N000002 HC RX 255 OP 636: Performed by: STUDENT IN AN ORGANIZED HEALTH CARE EDUCATION/TRAINING PROGRAM

## 2022-04-05 PROCEDURE — 94660 CPAP INITIATION&MGMT: CPT

## 2022-04-05 PROCEDURE — 85025 COMPLETE CBC W/AUTO DIFF WBC: CPT | Performed by: PHYSICIAN ASSISTANT

## 2022-04-05 PROCEDURE — 250N000011 HC RX IP 250 OP 636: Performed by: INTERNAL MEDICINE

## 2022-04-05 PROCEDURE — 258N000003 HC RX IP 258 OP 636: Performed by: NURSE PRACTITIONER

## 2022-04-05 PROCEDURE — 93016 CV STRESS TEST SUPVJ ONLY: CPT | Performed by: INTERNAL MEDICINE

## 2022-04-05 PROCEDURE — 250N000013 HC RX MED GY IP 250 OP 250 PS 637: Performed by: HOSPITALIST

## 2022-04-05 PROCEDURE — A9502 TC99M TETROFOSMIN: HCPCS | Performed by: STUDENT IN AN ORGANIZED HEALTH CARE EDUCATION/TRAINING PROGRAM

## 2022-04-05 PROCEDURE — 85520 HEPARIN ASSAY: CPT | Performed by: PHYSICIAN ASSISTANT

## 2022-04-05 PROCEDURE — 78452 HT MUSCLE IMAGE SPECT MULT: CPT | Mod: 26 | Performed by: INTERNAL MEDICINE

## 2022-04-05 PROCEDURE — 93306 TTE W/DOPPLER COMPLETE: CPT | Mod: 26 | Performed by: INTERNAL MEDICINE

## 2022-04-05 PROCEDURE — 84132 ASSAY OF SERUM POTASSIUM: CPT | Performed by: PHYSICIAN ASSISTANT

## 2022-04-05 PROCEDURE — 250N000013 HC RX MED GY IP 250 OP 250 PS 637: Performed by: PHYSICIAN ASSISTANT

## 2022-04-05 PROCEDURE — 250N000011 HC RX IP 250 OP 636: Performed by: HOSPITALIST

## 2022-04-05 PROCEDURE — 36415 COLL VENOUS BLD VENIPUNCTURE: CPT | Performed by: PHYSICIAN ASSISTANT

## 2022-04-05 PROCEDURE — 92526 ORAL FUNCTION THERAPY: CPT | Mod: GN | Performed by: SPEECH-LANGUAGE PATHOLOGIST

## 2022-04-05 PROCEDURE — 250N000011 HC RX IP 250 OP 636: Performed by: NURSE PRACTITIONER

## 2022-04-05 PROCEDURE — 210N000001 HC R&B IMCU HEART CARE

## 2022-04-05 PROCEDURE — 99233 SBSQ HOSP IP/OBS HIGH 50: CPT | Performed by: PHYSICIAN ASSISTANT

## 2022-04-05 PROCEDURE — 92611 MOTION FLUOROSCOPY/SWALLOW: CPT | Mod: GN | Performed by: SPEECH-LANGUAGE PATHOLOGIST

## 2022-04-05 PROCEDURE — 85520 HEPARIN ASSAY: CPT | Performed by: INTERNAL MEDICINE

## 2022-04-05 RX ORDER — CAFFEINE CITRATE 20 MG/ML
60 SOLUTION INTRAVENOUS
Status: DISCONTINUED | OUTPATIENT
Start: 2022-04-05 | End: 2022-04-16

## 2022-04-05 RX ORDER — AMPICILLIN AND SULBACTAM 2; 1 G/1; G/1
3 INJECTION, POWDER, FOR SOLUTION INTRAMUSCULAR; INTRAVENOUS EVERY 6 HOURS
Status: DISCONTINUED | OUTPATIENT
Start: 2022-04-05 | End: 2022-04-08

## 2022-04-05 RX ORDER — ACYCLOVIR 200 MG/1
0-1 CAPSULE ORAL
Status: DISCONTINUED | OUTPATIENT
Start: 2022-04-05 | End: 2022-04-16

## 2022-04-05 RX ORDER — AMINOPHYLLINE 25 MG/ML
50-100 INJECTION, SOLUTION INTRAVENOUS
Status: DISCONTINUED | OUTPATIENT
Start: 2022-04-05 | End: 2022-04-09

## 2022-04-05 RX ORDER — BARIUM SULFATE 400 MG/ML
60 SUSPENSION ORAL ONCE
Status: COMPLETED | OUTPATIENT
Start: 2022-04-05 | End: 2022-04-05

## 2022-04-05 RX ORDER — GABAPENTIN 400 MG/1
400 CAPSULE ORAL 3 TIMES DAILY
Status: DISCONTINUED | OUTPATIENT
Start: 2022-04-05 | End: 2022-04-29 | Stop reason: HOSPADM

## 2022-04-05 RX ORDER — REGADENOSON 0.08 MG/ML
0.4 INJECTION, SOLUTION INTRAVENOUS ONCE
Status: COMPLETED | OUTPATIENT
Start: 2022-04-05 | End: 2022-04-05

## 2022-04-05 RX ORDER — ALBUTEROL SULFATE 90 UG/1
2 AEROSOL, METERED RESPIRATORY (INHALATION) EVERY 5 MIN PRN
Status: DISCONTINUED | OUTPATIENT
Start: 2022-04-05 | End: 2022-04-17

## 2022-04-05 RX ADMIN — ACETAMINOPHEN 650 MG: 325 TABLET, FILM COATED ORAL at 06:48

## 2022-04-05 RX ADMIN — ACETAMINOPHEN 650 MG: 325 TABLET, FILM COATED ORAL at 20:22

## 2022-04-05 RX ADMIN — GUAIFENESIN 10 ML: 100 SOLUTION ORAL at 22:19

## 2022-04-05 RX ADMIN — ROPINIROLE HYDROCHLORIDE 0.5 MG: 0.5 TABLET, FILM COATED ORAL at 12:30

## 2022-04-05 RX ADMIN — GUAIFENESIN 10 ML: 100 SOLUTION ORAL at 18:12

## 2022-04-05 RX ADMIN — GABAPENTIN 400 MG: 300 CAPSULE ORAL at 15:54

## 2022-04-05 RX ADMIN — Medication 1 PACKET: at 20:24

## 2022-04-05 RX ADMIN — POTASSIUM CHLORIDE 20 MEQ: 1500 TABLET, EXTENDED RELEASE ORAL at 19:42

## 2022-04-05 RX ADMIN — MULTIPLE VITAMINS W/ MINERALS TAB 1 TABLET: TAB at 09:28

## 2022-04-05 RX ADMIN — HEPARIN SODIUM 1800 UNITS/HR: 1000 INJECTION INTRAVENOUS; SUBCUTANEOUS at 16:30

## 2022-04-05 RX ADMIN — IPRATROPIUM BROMIDE AND ALBUTEROL SULFATE 3 ML: .5; 3 SOLUTION RESPIRATORY (INHALATION) at 15:53

## 2022-04-05 RX ADMIN — IPRATROPIUM BROMIDE AND ALBUTEROL SULFATE 3 ML: .5; 3 SOLUTION RESPIRATORY (INHALATION) at 11:03

## 2022-04-05 RX ADMIN — PREGABALIN 50 MG: 50 CAPSULE ORAL at 09:29

## 2022-04-05 RX ADMIN — IPRATROPIUM BROMIDE AND ALBUTEROL SULFATE 3 ML: .5; 3 SOLUTION RESPIRATORY (INHALATION) at 07:36

## 2022-04-05 RX ADMIN — PANTOPRAZOLE SODIUM 40 MG: 40 TABLET, DELAYED RELEASE ORAL at 09:29

## 2022-04-05 RX ADMIN — POTASSIUM CHLORIDE 40 MEQ: 1500 TABLET, EXTENDED RELEASE ORAL at 09:28

## 2022-04-05 RX ADMIN — PREGABALIN 50 MG: 50 CAPSULE ORAL at 20:23

## 2022-04-05 RX ADMIN — HYDROMORPHONE HYDROCHLORIDE 4 MG: 2 TABLET ORAL at 04:59

## 2022-04-05 RX ADMIN — Medication 1 PACKET: at 09:35

## 2022-04-05 RX ADMIN — Medication 1 CAPSULE: at 21:37

## 2022-04-05 RX ADMIN — GABAPENTIN 400 MG: 300 CAPSULE ORAL at 21:29

## 2022-04-05 RX ADMIN — GUAIFENESIN 10 ML: 100 SOLUTION ORAL at 13:23

## 2022-04-05 RX ADMIN — SPIRONOLACTONE 25 MG: 25 TABLET ORAL at 09:29

## 2022-04-05 RX ADMIN — HEPARIN SODIUM 1650 UNITS/HR: 1000 INJECTION INTRAVENOUS; SUBCUTANEOUS at 01:40

## 2022-04-05 RX ADMIN — HEPARIN SODIUM 1800 UNITS/HR: 1000 INJECTION INTRAVENOUS; SUBCUTANEOUS at 06:41

## 2022-04-05 RX ADMIN — AMPICILLIN SODIUM AND SULBACTAM SODIUM 3 G: 2; 1 INJECTION, POWDER, FOR SOLUTION INTRAMUSCULAR; INTRAVENOUS at 12:31

## 2022-04-05 RX ADMIN — Medication 250 MCG: at 09:28

## 2022-04-05 RX ADMIN — FERROUS SULFATE TAB 325 MG (65 MG ELEMENTAL FE) 325 MG: 325 (65 FE) TAB at 09:29

## 2022-04-05 RX ADMIN — CYANOCOBALAMIN TAB 1000 MCG 1000 MCG: 1000 TAB at 09:29

## 2022-04-05 RX ADMIN — SIMVASTATIN 40 MG: 40 TABLET, FILM COATED ORAL at 21:29

## 2022-04-05 RX ADMIN — Medication 34.6 MILLICURIE: at 08:41

## 2022-04-05 RX ADMIN — Medication 1 CAPSULE: at 09:29

## 2022-04-05 RX ADMIN — REGADENOSON 0.4 MG: 0.08 INJECTION, SOLUTION INTRAVENOUS at 08:36

## 2022-04-05 RX ADMIN — FUROSEMIDE 20 MG: 20 TABLET ORAL at 09:29

## 2022-04-05 RX ADMIN — BARIUM SULFATE 40 ML: 400 SUSPENSION ORAL at 14:40

## 2022-04-05 RX ADMIN — BUPROPION HYDROCHLORIDE 150 MG: 150 TABLET, EXTENDED RELEASE ORAL at 09:29

## 2022-04-05 RX ADMIN — B-COMPLEX W/ C & FOLIC ACID TAB 1 TABLET: TAB at 09:29

## 2022-04-05 RX ADMIN — ROPINIROLE HYDROCHLORIDE 1 MG: 0.5 TABLET, FILM COATED ORAL at 21:29

## 2022-04-05 RX ADMIN — FUROSEMIDE 20 MG: 20 TABLET ORAL at 15:54

## 2022-04-05 RX ADMIN — PIPERACILLIN AND TAZOBACTAM 3.38 G: 3; .375 INJECTION, POWDER, FOR SOLUTION INTRAVENOUS at 05:29

## 2022-04-05 RX ADMIN — GABAPENTIN 300 MG: 300 CAPSULE ORAL at 09:29

## 2022-04-05 RX ADMIN — AMPICILLIN SODIUM AND SULBACTAM SODIUM 3 G: 2; 1 INJECTION, POWDER, FOR SOLUTION INTRAMUSCULAR; INTRAVENOUS at 18:11

## 2022-04-05 RX ADMIN — HUMAN ALBUMIN MICROSPHERES AND PERFLUTREN 9 ML: 10; .22 INJECTION, SOLUTION INTRAVENOUS at 15:42

## 2022-04-05 RX ADMIN — CITALOPRAM HYDROBROMIDE 40 MG: 20 TABLET, FILM COATED ORAL at 09:28

## 2022-04-05 RX ADMIN — ASPIRIN 81 MG CHEWABLE TABLET 81 MG: 81 TABLET CHEWABLE at 19:42

## 2022-04-05 RX ADMIN — IPRATROPIUM BROMIDE AND ALBUTEROL SULFATE 3 ML: .5; 3 SOLUTION RESPIRATORY (INHALATION) at 19:47

## 2022-04-05 RX ADMIN — ROPINIROLE HYDROCHLORIDE 0.5 MG: 0.5 TABLET, FILM COATED ORAL at 09:28

## 2022-04-05 ASSESSMENT — ACTIVITIES OF DAILY LIVING (ADL)
ADLS_ACUITY_SCORE: 13
ADLS_ACUITY_SCORE: 11
ADLS_ACUITY_SCORE: 13
ADLS_ACUITY_SCORE: 11
ADLS_ACUITY_SCORE: 13
ADLS_ACUITY_SCORE: 11
ADLS_ACUITY_SCORE: 13
ADLS_ACUITY_SCORE: 11
ADLS_ACUITY_SCORE: 13
ADLS_ACUITY_SCORE: 11
ADLS_ACUITY_SCORE: 13
ADLS_ACUITY_SCORE: 11
ADLS_ACUITY_SCORE: 13
ADLS_ACUITY_SCORE: 11
ADLS_ACUITY_SCORE: 13

## 2022-04-05 NOTE — PROGRESS NOTES
04/05/22 1513   General Information   Onset of Illness/Injury or Date of Surgery 03/22/22   Referring Physician Avel Silverio PA-C   Patient/Family Therapy Goal Statement (SLP) Patient did not state.    Pertinent History of Current Problem Elizabeth Kelley is a 76 year old female with PMHx MS, chronic BLE lymphedema with venous insufficiency and chronic R leg cellulitis, GERD, hyperlipidemia, and depression who underwent previously scheduled excisional debridement of RLE cicumferential venous hypertensive ulceration and application of wound vac on 3/21/22. Hospitalist service consulted 3/23/22 for AMS ultimately determined to have sepsis 2/2 CAP with respiratory failure. She had been progressing well, completing antibiotic regimen and weaned to room air with worsening systemic evidence of infection and hypoxia on 4/3, resumed on broad-spectrum antibiotics and imaging suggestive of bilateral pulmonary infiltrates. Also with new diagnosis of afib with RVR requiring IV medications, transition to CCU.    Type of Evaluation   Type of Evaluation Swallow Evaluation   General Swallowing Observations   Swallowing Evaluation Videofluoroscopic swallow study (VFSS)   VFSS Evaluation   Radiologist Dr. Dempsey   Views Taken left lateral   Physical Location of Procedure [facility]   VFSS Textures Trialed thin liquids;mildly thick liquids;pureed;soft & bite-sized;solid foods   VFSS Eval: Thin Liquid Texture Trial   Mode of Presentation, Thin Liquid cup;spoon;straw;self-fed;fed by clinician   Preparatory Phase WFL   Oral Phase, Thin Liquid Premature pharyngeal entry   Pharyngeal Phase, Thin Liquid Delayed swallow reflex   Rosenbek's Penetration Aspiration Scale: Thin Liquid Trial Results 3 - contrast remains above the vocal cords, visible residue remains (penetration)   Successful Strategies Trialed During Procedure, Thin Liquid chin tuck  (Reduced the depth of penetration but did not eliminate it. )   Diagnostic  Statement Premature spillage to the pyriform sinuses with mild to moderately deep laryngeal penetration of the vestibule by cup and straw.    VFSS Eval: Mildly Thick Liquids   Mode of Presentation cup;spoon;self-fed;fed by clinician   Preparatory Phase WFL   Oral Phase Premature pharyngeal entry   Pharyngeal Phase WFL   Rosenbek's Penetration Aspiration Scale 2 - contrast enters airway, remains above the vocal cords, no residue remains (penetration)   Diagnostic Statement Flash penetration x1 no other penetration.    VFSS Evaluation: Puree Solid Texture Trial   Mode of Presentation, Puree spoon;fed by clinician   Preparatory Phase WFL   Oral Phase, Puree WFL   Pharyngeal Phase, Puree WFL   Rosenbek's Penetration Aspiration Scale: Puree Food Trial Results 1 - no aspiration, contrast does not enter airway   VFSS Eval: Soft & Bite Sized   Mode of Presentation spoon;fed by clinician   Preparatory Phase WFL   Oral Phase Premature phrayngeal entry   Pharyngeal Phase WFL   Rosenbek's Penetration Aspiration Scale 1 - no aspiration, contrast does not enter airway   VFSS Evaluation: Solid Food Texture Trial   Mode of Presentation, Solid spoon;fed by clinician   Preparatory Phase WFL   Oral Phase, Solid Premature pharyngeal entry   Pharyngeal Phase, Solid Delayed swallow reflex   Rosenbek's Penetration Aspiration Scale: Solid Food Trial Results 1 - no aspiration, contrast does not enter airway   Diagnostic Statement Premature entry to the level of the valleculae with slight/mild delay otherwise normal.    Esophageal Phase of Swallow   Patient reports or presents with symptoms of esophageal dysphagia Yes   Esophageal comments Moderate/large hiatal hernia on CT scan.   Swallowing Recommendations   Diet Consistency Recommendations regular diet;mildly thick liquids (level 2)   Supervision Level for Intake close supervision needed   Mode of Delivery Recommendations bolus size, small;food moistened;no straws;slow rate of intake    Postural Recommendations none   Swallowing Maneuver Recommendations alternate food and liquid intake;extra swallow   Recommended Feeding/Eating Techniques (Swallow Eval) maintain upright sitting position for eating;maintain upright posture during/after eating for 30 minutes;minimize distractions during oral intake;set-up and prepare tray   Medication Administration Recommendations, Swallowing (SLP) Whole in apple sauce or pudding followed by a sip of mildly thick liquids.    General Therapy Interventions   Planned Therapy Interventions Dysphagia Treatment   Dysphagia treatment Instruction of safe swallow strategies;Modified diet education   Clinical Impression   Criteria for Skilled Therapeutic Interventions Met (SLP Eval) Yes, treatment indicated   SLP Diagnosis Mild dysphagia   Risks & Benefits of therapy have been explained evaluation/treatment results reviewed;care plan/treatment goals reviewed;risks/benefits reviewed;current/potential barriers reviewed;participants voiced agreement with care plan;participants included;patient   Clinical Impression Comments Patient presents with mild oral and pharyngeal dysphagia on today's study. Deficits include; reduced bolus control with thin liquids resulting in premature entry to the level of the pyriform sinuses with mild to moderate penetration of the laryngeal vestibule by cup and straw. Mildly thick liquids there was spillage to the valleculae and only 1 episode of flash penetration via the cup. No penetration via the spoon. Pureed textures were WFL. Mastication was mildly prolonged for semi-solids and solids with premature entry to the valleculae and slight delay with solids otherwise normal. No oral or pharyngeal retention after the swallow. Recommend: 1. Continue on a regular diet but change to mildly thick liquids. May have ice chips between meals for xerostomia and comfort. 2. Up in a chair for all meals, no straws, small bites/sips, alternat liquids/solids  every few bites.    SLP Discharge Planning   SLP Discharge Recommendation Transitional Care Facility   SLP Rationale for DC Rec Patient is below her baseline swallow function.    SLP Brief overview of current status  Mild oral and pharyngeal dysphagia    Total Evaluation Time   Total Evaluation Time (Minutes) 17   SLP Goals   Therapy Frequency (SLP Eval) 5 times/wk   SLP Predicated Duration/Target Date for Goal Attainment 04/12/22   SLP Goals Swallow   SLP: Safely tolerate diet without signs/symptoms of aspiration Regular diet;Thin liquids;With use of swallow precautions;With assistance/supervision

## 2022-04-05 NOTE — PROGRESS NOTES
A&O x4. VSS on 3L O2.Tele SR. C/o RLE pain, PRN tylenol given. HILLMAN, but denies SOB at rest. Up A1 w/ walker. Purewick in place while in bed, devon area red with open sores. X3 dressing changes to RLE. Plan for stress test, swallow study, and ECHO tomorrow.

## 2022-04-05 NOTE — PROGRESS NOTES
Pt here with stress test today.   A&O, forgetful. VS- 02 2L nc.   Tele nsr.  Npo except meds at 0400 for stress test today.  Up with ax1 and walker.   Stated pain in leg from wound- received dilauid this am, and repositioned at times last night, patient refuses repositioning sometimes and likes to lay on back.  Pt scoring green on the Aggression Stop Light Tool.

## 2022-04-05 NOTE — PLAN OF CARE
Goal Outcome Evaluation:    Plan of Care Reviewed With: patient, daughter      Patient had very busy day, up in the chair for meals, change leg dressing at noon. Patient finished nuclear stress test, now patient at video swallow study. Daughter at bedside. Patient is up with assist and walker.

## 2022-04-05 NOTE — PROGRESS NOTES
Care Suites Procedure Nursing Note    Patient Information  Name: Elizabeth Kelley  Age: 76 year old    Procedure  Procedure: Jackelyn Stress Test  Procedure start time: 8:35 am  Procedure complete time: 8:50 am  Concerns/abnormal assessment: None  Pt tolerated well. VSS. Pt denied chest pain or pressure prior to injection. After injection denies chest pain.   Pt transferred back to Rm 264 per wheelchair with EKG tech.

## 2022-04-05 NOTE — PROGRESS NOTES
Spoke with Jean Claude FERNANDEZ, get TORB to discontinue Heparin gtt as Nuc stress test and Echo negative.

## 2022-04-05 NOTE — PROGRESS NOTES
[facility]    Medicine Progress Note - Hospitalist Service    Date of Admission:  3/21/2022    Assessment & Plan        Elizabeth Kelley is a 76 year old female with PMHx MS, chronic BLE lymphedema with venous insufficiency and chronic R leg cellulitis, GERD, hyperlipidemia, and depression who underwent previously scheduled excisional debridement of RLE cicumferential venous hypertensive ulceration and application of wound vac on 3/21/22. Hospitalist service consulted 3/23/22 for AMS ultimately determined to have sepsis 2/2 CAP with respiratory failure. She had been progressing well, completing antibiotic regimen and weaned to room air with worsening systemic evidence of infection and hypoxia on 4/3, resumed on broad-spectrum antibiotics and imaging suggestive of bilateral pulmonary infiltrates. Also with new diagnosis of afib with RVR requiring IV medications, transition to CCU.      Sepsis secondary to CAP vs aspiration pneumonia  Acute hypoxic respiratory failure secondary to above, improved initially, but worse again now  Hx of MRSA  * 3/23 Tmax 101.5, tachycardic in the 110s, hypoxic to 86% on room air. WBC 18.3, procal 0.17. CXR with patchy airspace opacities in the left mid and lower lung, suspicious for pneumonia. Treated with Zosyn for CAP, repeat CXR 3/28 with new right upper lobe infiltrate and basilar infiltrate lateral views. Received diuretics for volume overload, transitioned to Augmentin on 3/30 with plans for 14 day course to treat LE cellulitis.  * 4/3 with fever to 102, uptrending WBC with mildly elevated procalcitonin. C/O SOB, noted to be hypoxic. COVID-19, RSV, Influenza PCR neg. UA neg. CXR on 4/2 neg. LE wound valuated by vascular surgery, no evidence of infection. CTA Chest neg for PE, with bilateral pulmonary infiltrates L>R, most consistent with infectious etiology. Blood cultures x2 repeated. Resumed IV Zosyn, Vanco. ID consulted.   * WBC 30.5--30.1, Procalcitonin  0.51--1.08  - Zosyn transitioned to Unasyn on 4/5  - ID consulted, appreciate input  - Follow blood cultures, NGTD  - Sputum culture as able  - RCAT consulted, encourage pulmonary toilet with IS and acapella.  - Duonebs q4 hrs while awake, PRN albuterol nebs.  - Monitor fluid status.  - Resumed PTA Lasix and Aldactone.   - Robitussin prn.  - SLP re-consulted; evaluated on 3/24 without evidence of dysphagia however patient and daughter both report she had frequent coughing and difficulty swallowing at baseline, planning video swallow study today    Afib with RVR, new diagnosis  NSTEMI  Overnight 4/4 with RRT for development of afib with RVR, rates 130s-150s. In setting of sepsis. EKG with marked ST depression and TWI in lateral leads. Received IV diltiazem bolus with plan to transition to drip, developed three 3-second pauses with conversion to SR. Initiated on heparin drip. Remains in NSR this AM.  * Troponin 17--98  - Cardiology consulted, stress test and TTE today  - Consider discontinuing heparin drip pending cardiology input, TTE findings  - Tele     Encephalopathy, multifactorial, resolved  Altered mentation noted by nursing during hosptialization. Multifactorial in the setting of fever, narcotic pain medications, suspected underlying BOLIVAR, possible dehydration. Received dose of narcan, but did not significantly change pts mentation immediately, though seemed to improve within the hour after IVF bolus was near completion. Mental status continues to improved.  - Continue expectant management.  - Judicious pain med use.  - Bipap prn.  - Suspect underlying BOLIVAR, urged her to get evaluated as outpatient.  - Resumed PTA Wellbutrin, Requip as per the patient's request but will closely monitor mentation, especially if she will get pain medications; low threshold to hold these meds if she becomes confused/lethargic.  - Discontinue PTA lyrica, will increase gabapentin by 100mg      Acute blood loss anemia  Chronic  normocytic anemia  Baseline 10-11. Down to 8.3 post-procedure. No active bleeding. Could be dilutional as checked while receiving IVF bolus   3/24 - Hb down to 6.1 but repeated Hb 7.1, again down to 6.4. Transfused 1 unit PRBCs - 3/25/22. Transfused another unit PRBC on 3/27/22 for hemoglobin 6.7.  *Hgb 7.3 (8.2)  - Discontinued scheduled Celebrex.  - Iron studies suggestive of iron deficiency, started on oral iron supplement.  - CBC pending     Hematuria, resolved  Noted in AM on 3/30/22. Likely 2/2 trauma/irritation from garrison catheter. Has been on empiric Abx, UA 4/1 neg. Resolved as of 3/31. Garrison removed 4/1.     Chronic BLE lymphedema with venous insufficiency and chronic R leg cellulitis   S/P excisional debridement of RLE cicumferential venous hypertensive ulceration and application of wound vac (3/21/22)  S/P intraoperative wound examination, wound debridement, and application of myriad 2 layer graft on 3/28  - Defer routine post-operative cares including wound cares to Dr. Bullock.  - Judicious pain control.  - Gabapentin 400 mg po TID.  - PTA Lyrica discontinued 4/5 as above  - Continue PTA lasix, aldactone, and potassium supplement.     Severe Obesity, BMI 50.81  - Follow up with PCP.      MS  Ambulatory at baseline, though some weakness in lower extremities and intermittent upper extremity weakness.     GERD  - Continue PTA Protonix.     Hyperlipidemia  - Continue PTA simvastatin.     Depression  Resumed PTA Celexa, Wellbutrin and Lyrica on 3/26/22. Had been held for a few days given AMS, resumed when mentation back to baseline. As above, weaned lyrica off and solely on gabapentin 4/5.     RLS  - Continue PTA Requip.         Diet: Combination Diet Regular Diet    DVT Prophylaxis: Heparin  Garrison Catheter: Not present  Central Lines: None  Cardiac Monitoring: ACTIVE order. Indication: AMI (NSTEMI/ STEMI) (48 hours)  Code Status: Full Code      Disposition Plan   Expected Discharge: 04/06/2022      Anticipated discharge location: home with family    Delays:     Other (Add Comment)            The patient's care was discussed with the Attending Physician, Dr. Zacarias, Bedside Nurse and Patient.    Avel Silverio PA-C  Hospitalist Service  [facility]  Securely message with the Vocera Web Console (learn more here)  Text page via [facility] Paging/Directory         Clinically Significant Risk Factors Present on Admission                    ______________________________________________________________________    Interval History   Pt sitting up at edge of bed, ambulating in room with aide and walker. Denies cp, sob, lightheadedness. Planning multiple studies today including video swallow, lexiscan, echo. Low grade temp to 99 this AM. Breathing feels better, is down to 3L.    Data reviewed today: I reviewed all medications, new labs and imaging results over the last 24 hours. I personally reviewed no images or EKG's today.    Physical Exam   Vital Signs: Temp: 99  F (37.2  C) Temp src: Oral BP: 132/63 Pulse: 72   Resp: 24 SpO2: 92 % O2 Device: Nasal cannula Oxygen Delivery: 3 LPM  Weight: 282 lbs 14.4 oz  GEN: well-developed, well-nourished, appears comfortable  HEENT: NCAT, EOM intact bilaterally, sclera clear, conjunctiva normal, mucous membranes moist  CHEST: lungs CTA bilaterally, coarse in bilateral bases, no wheeze, no increased work of breathing  HEART: RRR, S1 & S2  ABD: soft, nontender, obese, no guarding or rigidity  MSK: AROM bilateral UE/LE  NEURO: awake, alert, oriented to name, place, and time. CN II-XII grossly intact.   SKIN: warm & dry without rash, R lower leg with chux dressing in place, no surrounding erythema, no pedal edema    Data   Recent Labs   Lab 04/05/22  0804 04/05/22  0450 04/04/22  1356 04/04/22  0523 04/04/22  0040 04/03/22  2122 04/03/22  1258 04/02/22  0719   WBC  --   --   --  30.1*  --   --  30.5* 20.0*   HGB  --   --   --  7.3*  --   --  8.2* 8.6*   MCV  --    --   --  87  --   --  90 89   PLT  --   --   --  395  --   --  444 489*     --   --  136  --   --  136 135   POTASSIUM 4.1  4.2  --  3.6 3.2*  --    < > 3.4 4.0   CHLORIDE 101  --   --  100  --   --  100 100   CO2 29  --   --  33*  --   --  32 32   BUN 27  --   --  32*  --   --  31* 34*   CR 0.94 1.02  --  1.03  --   --  1.00 0.97   ANIONGAP 7  --   --  3  --   --  4 3   JUNI 9.1  --   --  9.4  --   --  9.2 9.3   *  --   --  130* 130*   < > 127* 128*   ALBUMIN  --   --   --  1.9*  --   --   --   --    PROTTOTAL  --   --   --  6.6*  --   --   --   --    BILITOTAL  --   --   --  0.4  --   --   --   --    ALKPHOS  --   --   --  279*  --   --   --   --    ALT  --   --   --  31  --   --   --   --    AST  --   --   --  26  --   --   --   --     < > = values in this interval not displayed.     Recent Results (from the past 24 hour(s))   NM Lexiscan stress test (nuc card)   Result Value    Target     Baseline Systolic     Baseline Diastolic BP 67    Last Stress Systolic     Last Stress Diastolic BP 49    Baseline HR 81    Max HR  90    Max Predicted HR  62    Rate Pressure Product 9,900.0     Medications     heparin 1,800 Units/hr (04/05/22 0641)     Reason anticoagulant not prescribed for atrial fibrillation       - MEDICATION INSTRUCTIONS -       - MEDICATION INSTRUCTIONS -         ampicillin-sulbactam (UNASYN) IV  3 g Intravenous Q6H     aspirin  81 mg Oral QPM     buPROPion  150 mg Oral QAM     citalopram  40 mg Oral QAM     cyanocobalamin  1,000 mcg Oral Daily     diosmin-hesperidin 450-50  1 capsule Oral BID     ferrous sulfate  325 mg Oral Daily     furosemide  20 mg Oral BID     gabapentin  300 mg Oral TID     ipratropium - albuterol 0.5 mg/2.5 mg/3 mL  3 mL Nebulization Q4H While awake     Aleksandr  1 packet Oral BID     multivitamin w/minerals  1 tablet Oral Daily     pantoprazole  40 mg Oral Daily     polyethylene glycol  17 g Oral Daily     potassium chloride ER  20 mEq Oral QPM      potassium chloride ER  40 mEq Oral QAM     pregabalin  50 mg Oral BID     rOPINIRole  0.5 mg Oral BID     rOPINIRole  1 mg Oral At Bedtime     senna-docusate  1 tablet Oral BID     simvastatin  40 mg Oral At Bedtime     sodium chloride (PF)  3 mL Intracatheter Q8H     spironolactone  25 mg Oral Daily     vitamin B complex with vitamin C  1 tablet Oral Daily     Vitamin D3  250 mcg Oral Daily

## 2022-04-05 NOTE — PROGRESS NOTES
Care Management Follow Up    Length of Stay (days): 14    Expected Discharge Date: 04/06/2022     Concerns to be Addressed:       Patient plan of care discussed at interdisciplinary rounds: Yes    Anticipated Discharge Disposition: Transitional Care     Anticipated Discharge Services: None  Anticipated Discharge DME:      Patient/family educated on Medicare website which has current facility and service quality ratings:    Education Provided on the Discharge Plan:    Patient/Family in Agreement with the Plan: yes    Referrals Placed by CM/SW:    Private pay costs discussed: Not applicable    Additional Information:  Writer reviewed notes and saw that [facility]. Writer called [facility] in admissions asking that they re-review for a admission later this week. Writer re-sent referral. Writer also sent an updated referral to [facility]. [facility] declined due to insurance.     Addendum: Writer spoke with patient's daughter, Carolyn regarding discharge plans. Daughter stated she is not sure where else to send referrals to but has been reviewing medicare.gov for more choices. Daughter expressed that the plan of care has changed/progressed and is not sure if patient is staying until her graft or will discharge prior. Writer asked that she review medicare.gov and give choices to help facilitate the process of finding a facility for TCU. Writer expressed more referrals we can send out the better. Daughter understanding and will review. Care management will follow. Writer asked [facility] anticipated discharge date for patient and was informed could be Friday or over the weekend.     ROLLY Garg, SW   [facility] Work   [facility]

## 2022-04-05 NOTE — PROGRESS NOTES
[facility]    Infectious Disease Progress Note    Date of Service : 04/05/2022     Assessment:  76YF with morbid obesity who has been hospitalized since 3/21 with venous ulcers, RLE cellulitis and is s/p excisional debridement of RLE ulcer with wound vac. Now with fever and marked leukocytosis of 30K likely related to aspiration pneumonia/pneumonitis , covid/influenza negative. She has no other focal complaints, no diarrhea to suggest C.diff, CT abdomen is non focal and wound appears stable     Recommendations  1. Swallow evaluation and Stress test planned for today  2. Zosyn to Unasyn  3. Follow WBC and clinical status  4. Repeat CXR in am    Lisette Chow MD    Interval History   Sitting out in a chair, feels a little better, ongoing dry couth, tolerating antibiotics without side effects    Physical Exam   Temp: 99  F (37.2  C) Temp src: Oral BP: 132/63 Pulse: 72   Resp: 24 SpO2: 92 % O2 Device: Nasal cannula Oxygen Delivery: 3 LPM  Vitals:    04/02/22 0443 04/04/22 0413 04/05/22 0500   Weight: 130.8 kg (288 lb 5.8 oz) 130 kg (286 lb 9.6 oz) 128.3 kg (282 lb 14.4 oz)     Vital Signs with Ranges  Temp:  [98.6  F (37  C)-100.2  F (37.9  C)] 99  F (37.2  C)  Pulse:  [72-82] 72  Resp:  [12-27] 24  BP: (126-149)/(61-82) 132/63  SpO2:  [92 %-99 %] 92 %    Constitutional: Awake, alert, cooperative, no apparent distress  Lungs: coarse  Cardiovascular:S1S2  Abdomen: obese, soft  Skin: No rash  MS : LE dressings in place    Other:    Medications     heparin 1,800 Units/hr (04/05/22 0641)     Reason anticoagulant not prescribed for atrial fibrillation       - MEDICATION INSTRUCTIONS -       - MEDICATION INSTRUCTIONS -         aspirin  81 mg Oral QPM     buPROPion  150 mg Oral QAM     citalopram  40 mg Oral QAM     cyanocobalamin  1,000 mcg Oral Daily     diosmin-hesperidin 450-50  1 capsule Oral BID     ferrous sulfate  325 mg Oral Daily     furosemide  20 mg Oral BID     gabapentin  300 mg Oral TID      ipratropium - albuterol 0.5 mg/2.5 mg/3 mL  3 mL Nebulization Q4H While awake     Aleksandr  1 packet Oral BID     multivitamin w/minerals  1 tablet Oral Daily     pantoprazole  40 mg Oral Daily     piperacillin-tazobactam  3.375 g Intravenous Q6H     polyethylene glycol  17 g Oral Daily     potassium chloride ER  20 mEq Oral QPM     potassium chloride ER  40 mEq Oral QAM     pregabalin  50 mg Oral BID     rOPINIRole  0.5 mg Oral BID     rOPINIRole  1 mg Oral At Bedtime     senna-docusate  1 tablet Oral BID     simvastatin  40 mg Oral At Bedtime     sodium chloride (PF)  3 mL Intracatheter Q8H     spironolactone  25 mg Oral Daily     vitamin B complex with vitamin C  1 tablet Oral Daily     Vitamin D3  250 mcg Oral Daily       Data   All microbiology laboratory data reviewed.  Recent Labs   Lab Test 04/04/22  0523 04/03/22  1258 04/02/22  0719   WBC 30.1* 30.5* 20.0*   HGB 7.3* 8.2* 8.6*   HCT 24.4* 27.8* 29.4*   MCV 87 90 89    444 489*     Recent Labs   Lab Test 04/05/22  0804 04/05/22  0450 04/04/22  0523   CR 0.94 1.02 1.03     Recent Labs   Lab Test 10/20/21  0835   SED 55*     Component      Latest Ref Rng & Units 4/3/2022   MRSA Target DNA      Negative Negative   SA Target DNA       Negative       Imaging  3/21 CT chest PE /Abdomen pelvis  EXAM: CT CHEST PE ABDOMEN PELVIS W CONTRAST  LOCATION: [facility]  DATE/TIME: 4/3/2022 5:29 PM     INDICATION: PE suspected, low/intermediate probability, positive D-dimer. Abdominal Pain. Leukocytosis. Fever.  COMPARISON: Chest x-rays dated 04/02/2022 and CT pelvis dated 09/14/2019.  TECHNIQUE: CT chest pulmonary angiogram and routine CT abdomen pelvis with IV contrast. Arterial phase through the chest and venous phase through the abdomen and pelvis. Multiplanar reformats and MIP reconstructions were performed. Dose reduction   techniques were used.   CONTRAST: 135 mL Isovue-[id number].     FINDINGS:  ANGIOGRAM CHEST: Pulmonary arteries are  normal caliber and negative for pulmonary emboli. Thoracic aorta is negative for dissection. No CT evidence of right heart strain.      LUNGS AND PLEURA: Airspace infiltrates in all lobes of both lungs, worst in the lower lobes of the bilateral lower lungs with the largest most confluent consolidation in the left lower lung lobe and the next largest in the medial right lower lung lobe.   These areas could obscure nodularity. Lungs are otherwise grossly clear. No pneumothorax or significant right pleural fluid collection is identified. There is a small left pleural effusion.     MEDIASTINUM/AXILLAE: The heart is normal in size. There is a moderate to large size hiatal hernia containing most of the gastric cardia and fundus. No mediastinal, hilar, or axillary lymphadenopathy is identified although there are mildly prominent AP   window lymph nodes measuring up to 0.8 cm in short axis, pretracheal lymph nodes measuring up to 0.9 cm in short axis, subcarinal lymph node measuring up to 0.9 cm in short axis, right hilar lymph nodes measuring up to almost 1 cm in short axis and left   hilar lymph nodes are somewhat difficult to evaluate given the consolidation in the left lower lung lobe. Visualized portions of the thyroid are unremarkable. Thoracic aorta is of normal caliber. There are few atherosclerotic calcifications in the aorta.     CORONARY ARTERY CALCIFICATION: No significant coronary artery calcifications are identified.     HEPATOBILIARY: The liver enhances normally without focal lesion, biliary ductal dilatation or choledocholithiasis. Gallbladder is grossly normal in appearance without evidence for cholelithiasis.     PANCREAS: Normal.     SPLEEN: Normal.     ADRENAL GLANDS: Normal.     KIDNEYS/BLADDER: Normal.     BOWEL: There are scattered diverticula in the sigmoid and descending colon without pericolonic inflammatory change to suggest acute diverticulitis. A moderate amount of stool is seen in the colon.  Appendix is normal in appearance. Small bowel is of   normal caliber and appearance. The stomach contains a moderate amount of fluid and air. The gastric cardia and fundus is in the posterior mediastinum extending through a moderate to large size hiatal hernia.     LYMPH NODES: Normal.     VASCULATURE: There is mild nonaneurysmal atherosclerosis.     PELVIC ORGANS: Atrophic uterus is grossly unremarkable. Large calcified fibroid is again noted, similar to the prior CT pelvis dated 2019. Ovaries are not well seen. No adnexal masses are identified.     MUSCULOSKELETAL: There are degenerative changes in the spine, shoulders, hips, and right sacroiliac joint. No aggressive osseous lesions or acute osseous fractures are identified.     ADDITIONAL FINDINGS: No free intraperitoneal air or free fluid is identified. There is edema in the adipose tissues around the lateral aspects of the lower abdomen and around the anterior and lateral aspects of the pelvis consistent with mild anasarca.                                                                      IMPRESSION:  1.  Bilateral pulmonary infiltrates have an appearance most consistent with infectious etiology although could also be inflammatory. These are worst in the left lower lung lobe and next worse in the right lower lung lobe. Mildly prominent lymph nodes in   the mediastinum and hilar regions are likely reactive to the pulmonary processes. Follow-up CT chest is recommended after appropriate clinical treatment and resolution of the clinical symptoms to ensure resolution of the pulmonary and mediastinal   findings.  2.  No evidence for pulmonary artery embolism.  3.  Moderate size hiatal hernia with extension of the gastric cardia and a portion of the gastric fundus in the posterior inferior mediastinum.  4.  Mild anasarca.  5.  No other etiology for patient's symptoms is identified.

## 2022-04-05 NOTE — TREATMENT PLAN
Has 4/12 , 2:50pm follow up wound clinic, planned Vashe HOCL dressing changes TID right leg wound until clinic follow up  Ara

## 2022-04-06 ENCOUNTER — APPOINTMENT (OUTPATIENT)
Dept: PHYSICAL THERAPY | Facility: CLINIC | Age: 77
DRG: 264 | End: 2022-04-06
Attending: SURGERY
Payer: COMMERCIAL

## 2022-04-06 LAB
ABO/RH(D): NORMAL
ANION GAP SERPL CALCULATED.3IONS-SCNC: 3 MMOL/L (ref 3–14)
ANTIBODY SCREEN: NEGATIVE
BASOPHILS # BLD AUTO: 0.1 10E3/UL (ref 0–0.2)
BASOPHILS NFR BLD AUTO: 1 %
BLD PROD TYP BPU: NORMAL
BLOOD COMPONENT TYPE: NORMAL
BUN SERPL-MCNC: 28 MG/DL (ref 7–30)
CALCIUM SERPL-MCNC: 9.2 MG/DL (ref 8.5–10.1)
CHLORIDE BLD-SCNC: 102 MMOL/L (ref 94–109)
CO2 SERPL-SCNC: 31 MMOL/L (ref 20–32)
CODING SYSTEM: NORMAL
CREAT SERPL-MCNC: 0.92 MG/DL (ref 0.52–1.04)
CROSSMATCH: NORMAL
EOSINOPHIL # BLD AUTO: 0.5 10E3/UL (ref 0–0.7)
EOSINOPHIL NFR BLD AUTO: 4 %
ERYTHROCYTE [DISTWIDTH] IN BLOOD BY AUTOMATED COUNT: 15.9 % (ref 10–15)
GFR SERPL CREATININE-BSD FRML MDRD: 65 ML/MIN/1.73M2
GLUCOSE BLD-MCNC: 108 MG/DL (ref 70–99)
HCT VFR BLD AUTO: 23 % (ref 35–47)
HGB BLD-MCNC: 6.7 G/DL (ref 11.7–15.7)
IMM GRANULOCYTES # BLD: 0.1 10E3/UL
IMM GRANULOCYTES NFR BLD: 1 %
ISSUE DATE AND TIME: NORMAL
LYMPHOCYTES # BLD AUTO: 1.1 10E3/UL (ref 0.8–5.3)
LYMPHOCYTES NFR BLD AUTO: 10 %
MCH RBC QN AUTO: 26.1 PG (ref 26.5–33)
MCHC RBC AUTO-ENTMCNC: 29.1 G/DL (ref 31.5–36.5)
MCV RBC AUTO: 90 FL (ref 78–100)
MONOCYTES # BLD AUTO: 1.1 10E3/UL (ref 0–1.3)
MONOCYTES NFR BLD AUTO: 10 %
NEUTROPHILS # BLD AUTO: 8.2 10E3/UL (ref 1.6–8.3)
NEUTROPHILS NFR BLD AUTO: 74 %
NRBC # BLD AUTO: 0 10E3/UL
NRBC BLD AUTO-RTO: 0 /100
PLATELET # BLD AUTO: 376 10E3/UL (ref 150–450)
POTASSIUM BLD-SCNC: 4.3 MMOL/L (ref 3.4–5.3)
RBC # BLD AUTO: 2.57 10E6/UL (ref 3.8–5.2)
SODIUM SERPL-SCNC: 136 MMOL/L (ref 133–144)
SPECIMEN EXPIRATION DATE: NORMAL
UNIT ABO/RH: NORMAL
UNIT NUMBER: NORMAL
UNIT STATUS: NORMAL
UNIT TYPE ISBT: 6200
WBC # BLD AUTO: 11.1 10E3/UL (ref 4–11)

## 2022-04-06 PROCEDURE — 250N000013 HC RX MED GY IP 250 OP 250 PS 637: Performed by: SURGERY

## 2022-04-06 PROCEDURE — 94640 AIRWAY INHALATION TREATMENT: CPT | Mod: 76

## 2022-04-06 PROCEDURE — 999N000157 HC STATISTIC RCP TIME EA 10 MIN

## 2022-04-06 PROCEDURE — 250N000013 HC RX MED GY IP 250 OP 250 PS 637: Performed by: PHYSICIAN ASSISTANT

## 2022-04-06 PROCEDURE — 97116 GAIT TRAINING THERAPY: CPT | Mod: GP

## 2022-04-06 PROCEDURE — 250N000013 HC RX MED GY IP 250 OP 250 PS 637: Performed by: HOSPITALIST

## 2022-04-06 PROCEDURE — 99231 SBSQ HOSP IP/OBS SF/LOW 25: CPT | Performed by: INTERNAL MEDICINE

## 2022-04-06 PROCEDURE — 250N000013 HC RX MED GY IP 250 OP 250 PS 637: Performed by: INTERNAL MEDICINE

## 2022-04-06 PROCEDURE — 250N000009 HC RX 250: Performed by: SURGERY

## 2022-04-06 PROCEDURE — 99207 PR CDG-MDM COMPONENT: MEETS HIGH - UP CODED: CPT | Performed by: PHYSICIAN ASSISTANT

## 2022-04-06 PROCEDURE — 210N000001 HC R&B IMCU HEART CARE

## 2022-04-06 PROCEDURE — 999N000127 HC STATISTIC PERIPHERAL IV START W US GUIDANCE

## 2022-04-06 PROCEDURE — 94660 CPAP INITIATION&MGMT: CPT

## 2022-04-06 PROCEDURE — 99233 SBSQ HOSP IP/OBS HIGH 50: CPT | Performed by: PHYSICIAN ASSISTANT

## 2022-04-06 PROCEDURE — 94640 AIRWAY INHALATION TREATMENT: CPT

## 2022-04-06 PROCEDURE — 250N000011 HC RX IP 250 OP 636: Performed by: PHYSICIAN ASSISTANT

## 2022-04-06 PROCEDURE — 86901 BLOOD TYPING SEROLOGIC RH(D): CPT | Performed by: STUDENT IN AN ORGANIZED HEALTH CARE EDUCATION/TRAINING PROGRAM

## 2022-04-06 PROCEDURE — 86923 COMPATIBILITY TEST ELECTRIC: CPT | Performed by: SURGERY

## 2022-04-06 PROCEDURE — 250N000011 HC RX IP 250 OP 636: Performed by: SPECIALIST

## 2022-04-06 PROCEDURE — 97530 THERAPEUTIC ACTIVITIES: CPT | Mod: GP

## 2022-04-06 PROCEDURE — 80048 BASIC METABOLIC PNL TOTAL CA: CPT | Performed by: PHYSICIAN ASSISTANT

## 2022-04-06 PROCEDURE — P9016 RBC LEUKOCYTES REDUCED: HCPCS | Performed by: SURGERY

## 2022-04-06 PROCEDURE — 36415 COLL VENOUS BLD VENIPUNCTURE: CPT | Performed by: PHYSICIAN ASSISTANT

## 2022-04-06 PROCEDURE — 85025 COMPLETE CBC W/AUTO DIFF WBC: CPT | Performed by: PHYSICIAN ASSISTANT

## 2022-04-06 RX ORDER — FUROSEMIDE 10 MG/ML
20 INJECTION INTRAMUSCULAR; INTRAVENOUS
Status: DISCONTINUED | OUTPATIENT
Start: 2022-04-06 | End: 2022-04-09

## 2022-04-06 RX ORDER — BENZONATATE 100 MG/1
100 CAPSULE ORAL 3 TIMES DAILY PRN
Status: DISCONTINUED | OUTPATIENT
Start: 2022-04-06 | End: 2022-04-29 | Stop reason: HOSPADM

## 2022-04-06 RX ORDER — PANTOPRAZOLE SODIUM 40 MG/1
40 TABLET, DELAYED RELEASE ORAL
Status: DISCONTINUED | OUTPATIENT
Start: 2022-04-06 | End: 2022-04-29 | Stop reason: HOSPADM

## 2022-04-06 RX ADMIN — BUPROPION HYDROCHLORIDE 150 MG: 150 TABLET, EXTENDED RELEASE ORAL at 09:26

## 2022-04-06 RX ADMIN — IPRATROPIUM BROMIDE AND ALBUTEROL SULFATE 3 ML: .5; 3 SOLUTION RESPIRATORY (INHALATION) at 07:21

## 2022-04-06 RX ADMIN — CITALOPRAM HYDROBROMIDE 40 MG: 20 TABLET, FILM COATED ORAL at 09:26

## 2022-04-06 RX ADMIN — ACETAMINOPHEN 650 MG: 325 TABLET, FILM COATED ORAL at 11:51

## 2022-04-06 RX ADMIN — CYANOCOBALAMIN TAB 1000 MCG 1000 MCG: 1000 TAB at 09:26

## 2022-04-06 RX ADMIN — Medication 1 PACKET: at 09:29

## 2022-04-06 RX ADMIN — FUROSEMIDE 20 MG: 20 TABLET ORAL at 09:25

## 2022-04-06 RX ADMIN — SPIRONOLACTONE 25 MG: 25 TABLET ORAL at 09:26

## 2022-04-06 RX ADMIN — POTASSIUM CHLORIDE 40 MEQ: 1500 TABLET, EXTENDED RELEASE ORAL at 09:26

## 2022-04-06 RX ADMIN — PANTOPRAZOLE SODIUM 40 MG: 40 TABLET, DELAYED RELEASE ORAL at 15:55

## 2022-04-06 RX ADMIN — AMPICILLIN SODIUM AND SULBACTAM SODIUM 3 G: 2; 1 INJECTION, POWDER, FOR SOLUTION INTRAMUSCULAR; INTRAVENOUS at 00:24

## 2022-04-06 RX ADMIN — ACETAMINOPHEN 650 MG: 325 TABLET, FILM COATED ORAL at 20:18

## 2022-04-06 RX ADMIN — Medication 1 PACKET: at 21:41

## 2022-04-06 RX ADMIN — AMPICILLIN SODIUM AND SULBACTAM SODIUM 3 G: 2; 1 INJECTION, POWDER, FOR SOLUTION INTRAMUSCULAR; INTRAVENOUS at 06:17

## 2022-04-06 RX ADMIN — HYDROMORPHONE HYDROCHLORIDE 2 MG: 2 TABLET ORAL at 22:48

## 2022-04-06 RX ADMIN — AMPICILLIN SODIUM AND SULBACTAM SODIUM 3 G: 2; 1 INJECTION, POWDER, FOR SOLUTION INTRAMUSCULAR; INTRAVENOUS at 23:58

## 2022-04-06 RX ADMIN — ROPINIROLE HYDROCHLORIDE 0.5 MG: 0.5 TABLET, FILM COATED ORAL at 09:26

## 2022-04-06 RX ADMIN — ACETAMINOPHEN 650 MG: 325 TABLET, FILM COATED ORAL at 04:29

## 2022-04-06 RX ADMIN — AMPICILLIN SODIUM AND SULBACTAM SODIUM 3 G: 2; 1 INJECTION, POWDER, FOR SOLUTION INTRAMUSCULAR; INTRAVENOUS at 13:19

## 2022-04-06 RX ADMIN — BENZONATATE 100 MG: 100 CAPSULE ORAL at 21:42

## 2022-04-06 RX ADMIN — GUAIFENESIN 10 ML: 100 SOLUTION ORAL at 16:53

## 2022-04-06 RX ADMIN — GABAPENTIN 400 MG: 300 CAPSULE ORAL at 15:55

## 2022-04-06 RX ADMIN — POTASSIUM CHLORIDE 20 MEQ: 1500 TABLET, EXTENDED RELEASE ORAL at 20:18

## 2022-04-06 RX ADMIN — MULTIPLE VITAMINS W/ MINERALS TAB 1 TABLET: TAB at 09:25

## 2022-04-06 RX ADMIN — SIMVASTATIN 40 MG: 40 TABLET, FILM COATED ORAL at 21:42

## 2022-04-06 RX ADMIN — ASPIRIN 81 MG CHEWABLE TABLET 81 MG: 81 TABLET CHEWABLE at 20:18

## 2022-04-06 RX ADMIN — GABAPENTIN 400 MG: 300 CAPSULE ORAL at 09:26

## 2022-04-06 RX ADMIN — Medication 1 CAPSULE: at 21:41

## 2022-04-06 RX ADMIN — FUROSEMIDE 20 MG: 10 INJECTION, SOLUTION INTRAVENOUS at 15:55

## 2022-04-06 RX ADMIN — GABAPENTIN 400 MG: 300 CAPSULE ORAL at 21:41

## 2022-04-06 RX ADMIN — FERROUS SULFATE TAB 325 MG (65 MG ELEMENTAL FE) 325 MG: 325 (65 FE) TAB at 09:26

## 2022-04-06 RX ADMIN — Medication 250 MCG: at 09:26

## 2022-04-06 RX ADMIN — PANTOPRAZOLE SODIUM 40 MG: 40 TABLET, DELAYED RELEASE ORAL at 09:26

## 2022-04-06 RX ADMIN — AMPICILLIN SODIUM AND SULBACTAM SODIUM 3 G: 2; 1 INJECTION, POWDER, FOR SOLUTION INTRAMUSCULAR; INTRAVENOUS at 18:03

## 2022-04-06 RX ADMIN — ROPINIROLE HYDROCHLORIDE 0.5 MG: 0.5 TABLET, FILM COATED ORAL at 13:19

## 2022-04-06 RX ADMIN — Medication 1 CAPSULE: at 09:27

## 2022-04-06 RX ADMIN — IPRATROPIUM BROMIDE AND ALBUTEROL SULFATE 3 ML: .5; 3 SOLUTION RESPIRATORY (INHALATION) at 15:22

## 2022-04-06 RX ADMIN — B-COMPLEX W/ C & FOLIC ACID TAB 1 TABLET: TAB at 09:26

## 2022-04-06 RX ADMIN — IPRATROPIUM BROMIDE AND ALBUTEROL SULFATE 3 ML: .5; 3 SOLUTION RESPIRATORY (INHALATION) at 19:11

## 2022-04-06 RX ADMIN — ROPINIROLE HYDROCHLORIDE 1 MG: 0.5 TABLET, FILM COATED ORAL at 21:41

## 2022-04-06 ASSESSMENT — ACTIVITIES OF DAILY LIVING (ADL)
ADLS_ACUITY_SCORE: 11
ADLS_ACUITY_SCORE: 15
ADLS_ACUITY_SCORE: 11
ADLS_ACUITY_SCORE: 11
ADLS_ACUITY_SCORE: 17
ADLS_ACUITY_SCORE: 11
ADLS_ACUITY_SCORE: 17
ADLS_ACUITY_SCORE: 11
ADLS_ACUITY_SCORE: 11
ADLS_ACUITY_SCORE: 15
ADLS_ACUITY_SCORE: 11
ADLS_ACUITY_SCORE: 17

## 2022-04-06 NOTE — PROGRESS NOTES
[facility]  Cardiology Progress Note  Date of Admission: 3/21/2022  Date of Service: 04/06/2022  Primary Cardiologist: New to cardiology    Assessment & Plan   Elizabeth Kelley is a 76 year old female with past medical history significant for chronic lymphedema, venous insufficiency and chronic right leg cellulitis, GERD, HTN, HLP, depression, obesity admitted on 3/21/2022 post wound debridement of chronic RLE wound and wound vac placement. She was determined to have sepsis secondary to CAP with respiratory failure and developed atrial fibrillation with RVR.     Interval History:  No recurrent atrial fibrillation. Stress test negative.     Assessment:   1. Atrial fibrillation with RVR in the setting of sepsis    RRT on 4/4 for atrial fibrillation with RVR (HRs in 130-150)    EKG showed ST depression and TWI in lateral leads    Started on IV diltiazem and became bradycardic and converted to SR    Echocardiogram (4/5): LVEF 60-65% without WMA or valvular abnormalities.     If she has another episode of afib with RVR, would anticoagulate, and use amiodarone without bolus acutely rather than cardizem.    2. NSTEMI, demand ischemia    Troponin 17-98    Stress test negative for ischemia or infarction    3. Volume overload    Weight up ~20 lbs since admission    IV Lasix 20 mg BID    4. Sepsis    Secondary CAP vs aspiration pneumonia    5. Morbid obesity    BMI 50    6. Chronic BLE lymphedema with venous insufficiency and chronic R leg cellulitis     S/P excisional debridement of RLE cicumferential venous hypertensive ulceration and application of wound vac (3/21/22)    S/P intraoperative wound examination, wound debridement (3/28/2022)      Plan:  1. Diuresis per primary team  2. Cardiology will sign off  3. 7 day Zio Patch on discharge and follow up with cardiology in 1 month post discharge     This assessment and plan was formulated under the guidance of Dr. Zuñiga.    A total of 15 minutes  "was spent face-to-face or coordinating care of Elizabeth Kelley. Over 50% of our time was spent counseling the patient and/or coordinating care.    LAXMI FOUNTAIN CNP  Pager:  [phone]   (8 am - 5pm, M-F)    ATTESTATION\"  Ms. Kelley was seen and examined. Agree with note and mutually determined plan of care.   ASAD Zuñiga MD       Physical Exam   Temp: 98.2  F (36.8  C) Temp src: Oral BP: 120/69 Pulse: 69   Resp: 17 SpO2: 99 % O2 Device: Nasal cannula Oxygen Delivery: 2 LPM  Vitals:    04/04/22 0413 04/05/22 0500 04/06/22 0500   Weight: 130 kg (286 lb 9.6 oz) 128.3 kg (282 lb 14.4 oz) 129 kg (284 lb 6.3 oz)       Constitutional:   NAD    Skin:   Warm and dry   Head:   Nontraumatic   Neck:   Unable to assess   Lungs:   symmetric, clear to auscultation   Cardiovascular:   regular rate and rhythm   Abdomen:   Obese   Neurological:   Grossly nonfocal     Medications     Reason anticoagulant not prescribed for atrial fibrillation       - MEDICATION INSTRUCTIONS -       - MEDICATION INSTRUCTIONS -         ampicillin-sulbactam (UNASYN) IV  3 g Intravenous Q6H     aspirin  81 mg Oral QPM     buPROPion  150 mg Oral QAM     citalopram  40 mg Oral QAM     cyanocobalamin  1,000 mcg Oral Daily     diosmin-hesperidin 450-50  1 capsule Oral BID     ferrous sulfate  325 mg Oral Daily     furosemide  20 mg Oral BID     gabapentin  400 mg Oral TID     ipratropium - albuterol 0.5 mg/2.5 mg/3 mL  3 mL Nebulization Q4H While awake     Aleksandr  1 packet Oral BID     multivitamin w/minerals  1 tablet Oral Daily     pantoprazole  40 mg Oral Daily     polyethylene glycol  17 g Oral Daily     potassium chloride ER  20 mEq Oral QPM     potassium chloride ER  40 mEq Oral QAM     rOPINIRole  0.5 mg Oral BID     rOPINIRole  1 mg Oral At Bedtime     senna-docusate  1 tablet Oral BID     simvastatin  40 mg Oral At Bedtime     sodium chloride (PF)  3 mL Intracatheter Q8H     spironolactone  25 mg Oral Daily     vitamin B complex with " vitamin C  1 tablet Oral Daily     Vitamin D3  250 mcg Oral Daily       Code Status    Full Code    Allergies   Allergies   Allergen Reactions     Other Environmental Allergy Anaphylaxis     Bees/Wasps Stings     Adhesive Tape Other (See Comments)     Red,itchy and oozes     Adhesive [Mecrylate] Unknown     Other reaction(s): sores     Percocet [Oxycodone-Acetaminophen] Rash     Vicodin [Hydrocodone-Acetaminophen] Nausea and Vomiting and Hives

## 2022-04-06 NOTE — PROGRESS NOTES
[facility]    Medicine Progress Note - Hospitalist Service    Date of Admission:  3/21/2022    Assessment & Plan        Elizabeth Kelley is a 76 year old female with PMHx MS, chronic BLE lymphedema with venous insufficiency and chronic R leg cellulitis, GERD, hyperlipidemia, and depression who underwent previously scheduled excisional debridement of RLE cicumferential venous hypertensive ulceration and application of wound vac on 3/21/22. Hospitalist service consulted 3/23/22 for AMS ultimately determined to have sepsis 2/2 CAP with respiratory failure. She had been progressing well, completing antibiotic regimen and weaned to room air with worsening systemic evidence of infection and hypoxia on 4/3, resumed on broad-spectrum antibiotics and imaging suggestive of bilateral pulmonary infiltrates. Also with new diagnosis of afib with RVR requiring IV medications, transition to CCU.      Sepsis secondary to CAP vs aspiration pneumonia, recurrent  Acute hypoxic respiratory failure secondary to above, improving  Hx of MRSA  * 3/23 Tmax 101.5, tachycardic in the 110s, hypoxic to 86% on room air. WBC 18.3, procal 0.17. CXR with patchy airspace opacities in the left mid and lower lung, suspicious for pneumonia. Treated with Zosyn for CAP, repeat CXR 3/28 with new right upper lobe infiltrate and basilar infiltrate lateral views. Received diuretics for volume overload, transitioned to Augmentin on 3/30 with plans for 14 day course to treat LE cellulitis.  * 4/3 with fever to 102, uptrending WBC with mildly elevated procalcitonin. C/O SOB, noted to be hypoxic. COVID-19, RSV, Influenza PCR neg. UA neg. CXR on 4/2 neg. LE wound valuated by vascular surgery, no evidence of infection. CTA Chest neg for PE, with bilateral pulmonary infiltrates L>R, most consistent with infectious etiology. Blood cultures x2 repeated. Resumed IV Zosyn, Vanco. ID consulted.   * 4/5 Video swallow negative, cleared for reg diet  with instructions to sit upright, speech continuing to follow  * 4/6 Weaned to 1L O2. I/O appear innacurate as net value -18L, according to daily weights is +15 lbs with reported weight gain of 2lbs overnight.  - Zosyn transitioned to Unasyn on 4/5  - ID consulted, appreciate input, plan for Abx until 4/8  - Follow blood cultures, NGTD  - Sputum culture as able  - RCAT consulted, encourage pulmonary toilet with IS and acapella.  - Duonebs q4 hrs while awake, PRN albuterol nebs.  - Monitor fluid status.  - IV diuresis with lasix 20mg BID  - Strict I&O  - Resumed PTA Aldactone  - Robitussin prn.  - SLP following    Afib with RVR, new diagnosis  NSTEMI  Overnight 4/4 with RRT for development of afib with RVR, rates 130s-150s. In setting of sepsis. EKG with marked ST depression and TWI in lateral leads. Received IV diltiazem bolus with plan to transition to drip, developed three 3-second pauses with conversion to SR. Initiated on heparin drip. Remains in NSR this AM.  * Troponin 17--98  * Lexiscan stress negative for inducible ischemia  * TTE with LVEF 60-65%, no WMAs, no valvular dz, mildly elevated PA pressure  - Cardiology consulted, ischemic workup neg, no indication for AC at this time given setting of sepsis. If afib recurs, would rec AC and tx with amiodarone rather than diltiazem  - Tele     Encephalopathy, multifactorial, resolved  Altered mentation noted by nursing during hosptialization. Multifactorial in the setting of fever, narcotic pain medications, suspected underlying BOLIVAR, possible dehydration. Received dose of narcan, but did not significantly change pts mentation immediately, though seemed to improve within the hour after IVF bolus was near completion. Mental status continues to improved.  - Continue expectant management.  - Bipap prn.  - Suspect underlying BOLIVAR, urged her to get evaluated as outpatient.  - Resumed PTA Wellbutrin, Requip as per the patient's request but will closely monitor mentation;  low threshold to hold these meds if she becomes confused/lethargic.  - Discontinue PTA lyrica, will increase gabapentin by 100mg      Acute blood loss anemia  Chronic normocytic anemia  Baseline 10-11. Down to 8.3 post-procedure. No active bleeding. Could be dilutional as checked while receiving IVF bolus   *Received 1u PRBCs 3/25, 3/27 for Hgb < 7  *4/6 Hgb 6.7, no active evidence of bleeding with stable hemodynamics  - Additional 1u PRBCs for Hgb < 7  - Discontinued scheduled Celebrex  - Increase PPI proph to BID  - Iron studies suggestive of iron deficiency, started on oral iron supplement.     Hematuria, resolved  Noted in AM on 3/30/22. Likely 2/2 trauma/irritation from garrison catheter. Has been on empiric Abx, UA 4/1 neg. Resolved as of 3/31. Garrison removed 4/1.     Chronic BLE lymphedema with venous insufficiency and chronic R leg cellulitis   S/P excisional debridement of RLE cicumferential venous hypertensive ulceration and application of wound vac (3/21/22)  S/P intraoperative wound examination, wound debridement, and application of myriad 2 layer graft on 3/28  - Defer routine post-operative cares including wound cares to Dr. Bullock.  - Judicious pain control.  - Gabapentin 400 mg po TID.  - PTA Lyrica discontinued 4/5 as above  - Continue PTA aldactone and potassium supplement  - IV diuresis as above     Severe Obesity, BMI 50.81  - Follow up with PCP.      MS  Ambulatory at baseline, though some weakness in lower extremities and intermittent upper extremity weakness.     GERD  - Continue PTA Protonix.     Hyperlipidemia  - Continue PTA simvastatin.     Depression  Resumed PTA Celexa, Wellbutrin and Lyrica on 3/26/22. Had been held for a few days given AMS, resumed when mentation back to baseline. As above, weaned lyrica off and solely on gabapentin 4/5.     RLS  - Continue PTA Requip.       Diet: Combination Diet Regular Diet; Mildly Thick (level 2) (No straws)    DVT Prophylaxis: Pneumatic Compression  Devices and Ambulate every shift  Amato Catheter: Not present  Central Lines: None  Cardiac Monitoring: ACTIVE order. Indication: AMI (NSTEMI/ STEMI) (48 hours)  Code Status: Full Code      Disposition Plan   Expected Discharge: 4/10/22   Anticipated discharge location: TCU       The patient's care was discussed with the Attending Physician, Dr. Zacarias, Bedside Nurse, Care Coordinator/ and Patient.    Avel Silverio PA-C  Hospitalist Service  [facility]  Securely message with the Vocera Web Console (learn more here)  Text page via [employer] Paging/Directory         Clinically Significant Risk Factors Present on Admission                    ______________________________________________________________________    Interval History   Pt sitting up in chair, eating breakfast. Feels well, denies cp, sob, abdominal discomfort. Now agreeable to TCU given events of the past few days. Hopeful to discharge soon. Afebrile. Down to 1L supplemental oxygen.    Data reviewed today: I reviewed all medications, new labs and imaging results over the last 24 hours. I personally reviewed no images or EKG's today.    Physical Exam   Vital Signs: Temp: 98.3  F (36.8  C) Temp src: Oral BP: 131/58 Pulse: 75   Resp: 18 SpO2: 92 % O2 Device: Nasal cannula Oxygen Delivery: 1 LPM  Weight: 284 lbs 6.29 oz  GEN: well-developed, well-nourished, appears comfortable  HEENT: NCAT, EOM intact bilaterally, sclera clear, conjunctiva normal, mucous membranes moist  CHEST: lungs CTA bilaterally, coarse in bilateral bases, no wheeze, no increased work of breathing  HEART: RRR, S1 & S2  ABD: soft, nontender, obese, no guarding or rigidity  MSK: AROM bilateral UE/LE  NEURO: awake, alert, oriented to name, place, and time. CN II-XII grossly intact.   SKIN: warm & dry without rash, R lower leg with chux dressing in place, no surrounding erythema, bilateral lower leg edema persists    Data   Recent Labs   Lab 04/06/22  6026  04/05/22  1215 04/05/22  0804 04/05/22  0450 04/04/22  1356 04/04/22  0523   WBC 11.1* 22.1*  --   --   --  30.1*   HGB 6.7* 7.7*  --   --   --  7.3*   MCV 90 90  --   --   --  87    385  --   --   --  395     --  137  --   --  136   POTASSIUM 4.3  --  4.1  4.2  --  3.6 3.2*   CHLORIDE 102  --  101  --   --  100   CO2 31  --  29  --   --  33*   BUN 28  --  27  --   --  32*   CR 0.92  --  0.94 1.02  --  1.03   ANIONGAP 3  --  7  --   --  3   JUNI 9.2  --  9.1  --   --  9.4   *  --  115*  --   --  130*   ALBUMIN  --   --   --   --   --  1.9*   PROTTOTAL  --   --   --   --   --  6.6*   BILITOTAL  --   --   --   --   --  0.4   ALKPHOS  --   --   --   --   --  279*   ALT  --   --   --   --   --  31   AST  --   --   --   --   --  26     Recent Results (from the past 24 hour(s))   XR Video Swallow with SLP or OT    Narrative    VIDEO SWALLOWING EVALUATION   4/5/2022 2:56 PM     HISTORY: recurrent pneumonia rule out aspiration.    COMPARISON: None.    FLUOROSCOPY TIME: 1.7 minutes.  SPOT IMAGES OR CINE RUNS: 14      Impression    Impression:  Thin: Premature spillage to the piriform sinuses. There was moderate  to deep penetration with cup and straw but no definite aspiration.  Possible mild improvement in the degree of penetration with chin tuck  maneuver.    Mildly thick: Mild flash penetration with cup. Otherwise, no  penetration or aspiration.    Pudding: No penetration or aspiration.    Semisolid: No penetration or aspiration.    Solid: No penetration or aspiration.    This study only includes the cervical esophagus. Please see separate  report from speech pathology for additional details.    TIMMY ORTEGA MD         SYSTEM ID:  [id number]   Echocardiogram Complete   Result Value    LVEF  60-65%    Narrative    [id number]  [id number]  [id number]  [id number]^MARCH^KIMBERLEY^RAULITO     [facility]  Echocardiography Laboratory  [address] [place] [address]     Name: LELO ALVAREZ  R  MRN: [id number]  : 1945  Study Date: 2022 03:10 PM  Age: 76 yrs  Gender: Female  Patient Location: [facility]  Reason For Study: SOB  Ordering Physician: KIMBERLEY KELLER  Referring Physician: Francisco Ramirez  Performed By: Lars Tanner JAYLEN     BSA: 2.2 m2  Height: 61 in  Weight: 286 lb  HR: 79  BP: 140/72 mmHg  ______________________________________________________________________________  Procedure  Complete Portable Echo Adult. Optison (NDC #5603-7243) given intravenously.  Limited Portable Echo Adult.  ______________________________________________________________________________  Interpretation Summary     1. Normal biventricular size and function. Left ventricular ejection fraction  of 60-65%. No segmental wall motion abnormalities noted.  2. No hemodynamically significant valvular disease.  4. Mildly elevated pulmonary artery pressure.  No prior study for comparison. Technically adequate study.  ______________________________________________________________________________  Left Ventricle  The left ventricle is normal in size. There is normal left ventricular wall  thickness. Left ventricular systolic function is normal. The visual ejection  fraction is 60-65%. Grade II or moderate diastolic dysfunction.     Right Ventricle  The right ventricle is normal in size and function.     Atria  The left atrium is mildly dilated. Right atrial size is normal.     Mitral Valve  The mitral valve leaflets appear normal. There is no evidence of stenosis,  fluttering, or prolapse. There is trace mitral regurgitation.     Tricuspid Valve  Normal tricuspid valve. There is trace to mild tricuspid regurgitation. The  right ventricular systolic pressure is approximated at 33.5 mmHg plus the  right atrial pressure.     Aortic Valve  The aortic valve is not well visualized. There is trace aortic regurgitation.     Pulmonic Valve  The pulmonic valve is not well visualized.     Vessels  Normal size aorta.  Normal size ascending aorta. IVC diameter <2.1 cm  collapsing >50% with sniff suggests a normal RA pressure of 3 mmHg.     Pericardium  There is no pericardial effusion.     Rhythm  Sinus rhythm was noted.  ______________________________________________________________________________  MMode/2D Measurements & Calculations  IVSd: 1.1 cm     LVIDd: 4.3 cm  LVIDs: 2.3 cm  LVPWd: 1.2 cm  FS: 45.4 %  LV mass(C)d: 171.2 grams  LV mass(C)dI: 77.9 grams/m2  Ao root diam: 3.1 cm  LA dimension: 3.4 cm  asc Aorta Diam: 3.0 cm  LA/Ao: 1.1  LVOT diam: 2.1 cm  LVOT area: 3.4 cm2  LA Volume (BP): 53.9 ml  LA Volume Index (BP): 24.5 ml/m2  RWT: 0.54     Doppler Measurements & Calculations  MV E max raj: 115.0 cm/sec  MV A max raj: 83.9 cm/sec  MV E/A: 1.4  MV dec time: 0.19 sec  Ao V2 max: 190.0 cm/sec  Ao max P.0 mmHg  Ao V2 mean: 131.8 cm/sec  Ao mean P.8 mmHg  Ao V2 VTI: 41.3 cm  ALISHA(I,D): 2.5 cm2  ALISHA(V,D): 2.4 cm2  LV V1 max P.2 mmHg  LV V1 max: 134.0 cm/sec  LV V1 VTI: 30.1 cm  SV(LVOT): 103.8 ml  SI(LVOT): 47.2 ml/m2  PA acc time: 0.11 sec  TR max raj: 289.6 cm/sec  TR max P.5 mmHg  AV Raj Ratio (DI): 0.71  ALISHA Index (cm2/m2): 1.1  E/E' av.0  Lateral E/e': 11.5  Medial E/e': 14.5     ______________________________________________________________________________  Report approved by: Kayla Schulte 2022 04:23 PM           Medications     Reason anticoagulant not prescribed for atrial fibrillation       - MEDICATION INSTRUCTIONS -       - MEDICATION INSTRUCTIONS -         ampicillin-sulbactam (UNASYN) IV  3 g Intravenous Q6H     aspirin  81 mg Oral QPM     buPROPion  150 mg Oral QAM     citalopram  40 mg Oral QAM     cyanocobalamin  1,000 mcg Oral Daily     diosmin-hesperidin 450-50  1 capsule Oral BID     ferrous sulfate  325 mg Oral Daily     furosemide  20 mg Intravenous BID     gabapentin  400 mg Oral TID     ipratropium - albuterol 0.5 mg/2.5 mg/3 mL  3 mL Nebulization Q4H While awake      Aleksandr  1 packet Oral BID     multivitamin w/minerals  1 tablet Oral Daily     pantoprazole  40 mg Oral BID AC     polyethylene glycol  17 g Oral Daily     potassium chloride ER  20 mEq Oral QPM     potassium chloride ER  40 mEq Oral QAM     rOPINIRole  0.5 mg Oral BID     rOPINIRole  1 mg Oral At Bedtime     senna-docusate  1 tablet Oral BID     simvastatin  40 mg Oral At Bedtime     sodium chloride (PF)  3 mL Intracatheter Q8H     spironolactone  25 mg Oral Daily     vitamin B complex with vitamin C  1 tablet Oral Daily     Vitamin D3  250 mcg Oral Daily

## 2022-04-06 NOTE — PLAN OF CARE
Goal Outcome Evaluation: Pt is A&Ox4, forgetful and had hallucinations after video swallow saying that she saw earlier a little boy in a room but not anymore, VSS and on tele SR, on 2 L NC and IS and cappella encouraged, has dry non productive cough and Robitussin given, R leg dressing changed, pt is up with 1 assist RW and GB. Heparin gtt stopped after Echo, Unasyn  3g/100 ml every 6 hrs.   .    Plan of Care Reviewed With: patient, daughter     Overall Patient Progress: no change

## 2022-04-06 NOTE — PLAN OF CARE
Goal Outcome Evaluation:    Plan of Care Reviewed With: patient      Patient is up in the chair for food, took one byte and sneezed, started to cough. Daughter was at bedside. Instructed only pudding and soft food. VSS.

## 2022-04-06 NOTE — PLAN OF CARE
Pt stable over night,  VSS, Tele: SR without ectopy.  Tylenol given for c/o a HA.  She has been incontinent of stool and urine twice.  Hgb this AM is 6.7, Dr Brennan informed, 1 unit of PRBC's will be given per PRN order.  Pt slept on CPAP until 0400.

## 2022-04-06 NOTE — PROGRESS NOTES
[facility]    Infectious Disease Progress Note    Date of Service : 04/06/2022     Assessment:  76YF with morbid obesity who has been hospitalized since 3/21 with venous ulcers, RLE cellulitis and is s/p excisional debridement of RLE ulcer with wound vac. Now with fever and marked leukocytosis of 30K likely related to aspiration pneumonia/pneumonitis , covid/influenza negative. She has no other focal complaints, no diarrhea to suggest C.diff, CT abdomen is non focal and wound appears stable. Improving.     -Aspiration pneumonia/pneumonitis  -Chronic LE ulcer s/p debridement  -Morbidly obese body habitus  -Atrial fibrillation with RVR, demand ischemia      Recommendations  1. No aspiration noted on swallow evaluation  2. Continue Unasyn. Plan antibiotics until 4/8  3. WBC improving    Lisette Chow MD    Interval History   Improving, feels better, breathing more comfortably, tolerating antibiotics without side effects      Physical Exam   Temp: 98.2  F (36.8  C) Temp src: Oral BP: 120/69 Pulse: 69   Resp: 17 SpO2: 99 % O2 Device: Nasal cannula Oxygen Delivery: 2 LPM  Vitals:    04/04/22 0413 04/05/22 0500 04/06/22 0500   Weight: 130 kg (286 lb 9.6 oz) 128.3 kg (282 lb 14.4 oz) 129 kg (284 lb 6.3 oz)     Vital Signs with Ranges  Temp:  [98.2  F (36.8  C)-99.9  F (37.7  C)] 98.2  F (36.8  C)  Pulse:  [66-94] 69  Resp:  [15-30] 17  BP: (114-145)/(57-73) 120/69  SpO2:  [95 %-99 %] 99 %    Constitutional: Awake, alert, cooperative, no apparent distress  Lungs: Clear to auscultation bilaterally, no crackles or wheezing  Cardiovascular: Regular rate and rhythm, normal S1 and S2, and no murmur noted  Abdomen: Normal bowel sounds, soft, non-distended, non-tender  Skin:LE dressings in place    Other:    Medications     Reason anticoagulant not prescribed for atrial fibrillation       - MEDICATION INSTRUCTIONS -       - MEDICATION INSTRUCTIONS -         ampicillin-sulbactam (UNASYN) IV  3 g Intravenous  Q6H     aspirin  81 mg Oral QPM     buPROPion  150 mg Oral QAM     citalopram  40 mg Oral QAM     cyanocobalamin  1,000 mcg Oral Daily     diosmin-hesperidin 450-50  1 capsule Oral BID     ferrous sulfate  325 mg Oral Daily     furosemide  20 mg Oral BID     gabapentin  400 mg Oral TID     ipratropium - albuterol 0.5 mg/2.5 mg/3 mL  3 mL Nebulization Q4H While awake     Aleksandr  1 packet Oral BID     multivitamin w/minerals  1 tablet Oral Daily     pantoprazole  40 mg Oral Daily     polyethylene glycol  17 g Oral Daily     potassium chloride ER  20 mEq Oral QPM     potassium chloride ER  40 mEq Oral QAM     rOPINIRole  0.5 mg Oral BID     rOPINIRole  1 mg Oral At Bedtime     senna-docusate  1 tablet Oral BID     simvastatin  40 mg Oral At Bedtime     sodium chloride (PF)  3 mL Intracatheter Q8H     spironolactone  25 mg Oral Daily     vitamin B complex with vitamin C  1 tablet Oral Daily     Vitamin D3  250 mcg Oral Daily       Data   All microbiology laboratory data reviewed.  Recent Labs   Lab Test 04/06/22  0544 04/05/22  1215 04/04/22  0523   WBC 11.1* 22.1* 30.1*   HGB 6.7* 7.7* 7.3*   HCT 23.0* 26.5* 24.4*   MCV 90 90 87    385 395     Recent Labs   Lab Test 04/06/22  0544 04/05/22  0804 04/05/22  0450   CR 0.92 0.94 1.02     Recent Labs   Lab Test 10/20/21  0835   SED 55*   Imaging  3/21 CT chest PE /Abdomen pelvis  EXAM: CT CHEST PE ABDOMEN PELVIS W CONTRAST  LOCATION: [facility]  DATE/TIME: 4/3/2022 5:29 PM     INDICATION: PE suspected, low/intermediate probability, positive D-dimer. Abdominal Pain. Leukocytosis. Fever.  COMPARISON: Chest x-rays dated 04/02/2022 and CT pelvis dated 09/14/2019.  TECHNIQUE: CT chest pulmonary angiogram and routine CT abdomen pelvis with IV contrast. Arterial phase through the chest and venous phase through the abdomen and pelvis. Multiplanar reformats and MIP reconstructions were performed. Dose reduction   techniques were used.   CONTRAST: 135  mL [id number].     FINDINGS:  ANGIOGRAM CHEST: Pulmonary arteries are normal caliber and negative for pulmonary emboli. Thoracic aorta is negative for dissection. No CT evidence of right heart strain.      LUNGS AND PLEURA: Airspace infiltrates in all lobes of both lungs, worst in the lower lobes of the bilateral lower lungs with the largest most confluent consolidation in the left lower lung lobe and the next largest in the medial right lower lung lobe.   These areas could obscure nodularity. Lungs are otherwise grossly clear. No pneumothorax or significant right pleural fluid collection is identified. There is a small left pleural effusion.     MEDIASTINUM/AXILLAE: The heart is normal in size. There is a moderate to large size hiatal hernia containing most of the gastric cardia and fundus. No mediastinal, hilar, or axillary lymphadenopathy is identified although there are mildly prominent AP   window lymph nodes measuring up to 0.8 cm in short axis, pretracheal lymph nodes measuring up to 0.9 cm in short axis, subcarinal lymph node measuring up to 0.9 cm in short axis, right hilar lymph nodes measuring up to almost 1 cm in short axis and left   hilar lymph nodes are somewhat difficult to evaluate given the consolidation in the left lower lung lobe. Visualized portions of the thyroid are unremarkable. Thoracic aorta is of normal caliber. There are few atherosclerotic calcifications in the aorta.     CORONARY ARTERY CALCIFICATION: No significant coronary artery calcifications are identified.     HEPATOBILIARY: The liver enhances normally without focal lesion, biliary ductal dilatation or choledocholithiasis. Gallbladder is grossly normal in appearance without evidence for cholelithiasis.     PANCREAS: Normal.     SPLEEN: Normal.     ADRENAL GLANDS: Normal.     KIDNEYS/BLADDER: Normal.     BOWEL: There are scattered diverticula in the sigmoid and descending colon without pericolonic inflammatory change to suggest  acute diverticulitis. A moderate amount of stool is seen in the colon. Appendix is normal in appearance. Small bowel is of   normal caliber and appearance. The stomach contains a moderate amount of fluid and air. The gastric cardia and fundus is in the posterior mediastinum extending through a moderate to large size hiatal hernia.     LYMPH NODES: Normal.     VASCULATURE: There is mild nonaneurysmal atherosclerosis.     PELVIC ORGANS: Atrophic uterus is grossly unremarkable. Large calcified fibroid is again noted, similar to the prior CT pelvis dated 2019. Ovaries are not well seen. No adnexal masses are identified.     MUSCULOSKELETAL: There are degenerative changes in the spine, shoulders, hips, and right sacroiliac joint. No aggressive osseous lesions or acute osseous fractures are identified.     ADDITIONAL FINDINGS: No free intraperitoneal air or free fluid is identified. There is edema in the adipose tissues around the lateral aspects of the lower abdomen and around the anterior and lateral aspects of the pelvis consistent with mild anasarca.                                                                      IMPRESSION:  1.  Bilateral pulmonary infiltrates have an appearance most consistent with infectious etiology although could also be inflammatory. These are worst in the left lower lung lobe and next worse in the right lower lung lobe. Mildly prominent lymph nodes in   the mediastinum and hilar regions are likely reactive to the pulmonary processes. Follow-up CT chest is recommended after appropriate clinical treatment and resolution of the clinical symptoms to ensure resolution of the pulmonary and mediastinal   findings.  2.  No evidence for pulmonary artery embolism.  3.  Moderate size hiatal hernia with extension of the gastric cardia and a portion of the gastric fundus in the posterior inferior mediastinum.  4.  Mild anasarca.  5.  No other etiology for patient's symptoms is  identified.

## 2022-04-07 ENCOUNTER — APPOINTMENT (OUTPATIENT)
Dept: SPEECH THERAPY | Facility: CLINIC | Age: 77
DRG: 264 | End: 2022-04-07
Attending: SURGERY
Payer: COMMERCIAL

## 2022-04-07 LAB
ATRIAL RATE - MUSE: 147 BPM
ATRIAL RATE - MUSE: 80 BPM
BASOPHILS # BLD AUTO: 0.1 10E3/UL (ref 0–0.2)
BASOPHILS # BLD AUTO: 0.1 10E3/UL (ref 0–0.2)
BASOPHILS NFR BLD AUTO: 1 %
BASOPHILS NFR BLD AUTO: 1 %
CREAT SERPL-MCNC: 0.94 MG/DL (ref 0.52–1.04)
DIASTOLIC BLOOD PRESSURE - MUSE: NORMAL MMHG
DIASTOLIC BLOOD PRESSURE - MUSE: NORMAL MMHG
EOSINOPHIL # BLD AUTO: 0.4 10E3/UL (ref 0–0.7)
EOSINOPHIL # BLD AUTO: 0.5 10E3/UL (ref 0–0.7)
EOSINOPHIL NFR BLD AUTO: 3 %
EOSINOPHIL NFR BLD AUTO: 4 %
ERYTHROCYTE [DISTWIDTH] IN BLOOD BY AUTOMATED COUNT: 15.9 % (ref 10–15)
ERYTHROCYTE [DISTWIDTH] IN BLOOD BY AUTOMATED COUNT: 15.9 % (ref 10–15)
GFR SERPL CREATININE-BSD FRML MDRD: 63 ML/MIN/1.73M2
HCT VFR BLD AUTO: 30 % (ref 35–47)
HCT VFR BLD AUTO: 30.8 % (ref 35–47)
HGB BLD-MCNC: 8.8 G/DL (ref 11.7–15.7)
HGB BLD-MCNC: 9.2 G/DL (ref 11.7–15.7)
IMM GRANULOCYTES # BLD: 0.3 10E3/UL
IMM GRANULOCYTES # BLD: 0.3 10E3/UL
IMM GRANULOCYTES NFR BLD: 2 %
IMM GRANULOCYTES NFR BLD: 2 %
INTERPRETATION ECG - MUSE: NORMAL
INTERPRETATION ECG - MUSE: NORMAL
LYMPHOCYTES # BLD AUTO: 1.1 10E3/UL (ref 0.8–5.3)
LYMPHOCYTES # BLD AUTO: 1.2 10E3/UL (ref 0.8–5.3)
LYMPHOCYTES NFR BLD AUTO: 11 %
LYMPHOCYTES NFR BLD AUTO: 9 %
MCH RBC QN AUTO: 26.2 PG (ref 26.5–33)
MCH RBC QN AUTO: 26.3 PG (ref 26.5–33)
MCHC RBC AUTO-ENTMCNC: 29.3 G/DL (ref 31.5–36.5)
MCHC RBC AUTO-ENTMCNC: 29.9 G/DL (ref 31.5–36.5)
MCV RBC AUTO: 88 FL (ref 78–100)
MCV RBC AUTO: 89 FL (ref 78–100)
MONOCYTES # BLD AUTO: 1.2 10E3/UL (ref 0–1.3)
MONOCYTES # BLD AUTO: 1.3 10E3/UL (ref 0–1.3)
MONOCYTES NFR BLD AUTO: 10 %
MONOCYTES NFR BLD AUTO: 11 %
NEUTROPHILS # BLD AUTO: 8.2 10E3/UL (ref 1.6–8.3)
NEUTROPHILS # BLD AUTO: 9.2 10E3/UL (ref 1.6–8.3)
NEUTROPHILS NFR BLD AUTO: 71 %
NEUTROPHILS NFR BLD AUTO: 75 %
NRBC # BLD AUTO: 0 10E3/UL
NRBC # BLD AUTO: 0 10E3/UL
NRBC BLD AUTO-RTO: 0 /100
NRBC BLD AUTO-RTO: 0 /100
P AXIS - MUSE: 62 DEGREES
P AXIS - MUSE: NORMAL DEGREES
PLAT MORPH BLD: ABNORMAL
PLATELET # BLD AUTO: 390 10E3/UL (ref 150–450)
PLATELET # BLD AUTO: 428 10E3/UL (ref 150–450)
PR INTERVAL - MUSE: 144 MS
PR INTERVAL - MUSE: NORMAL MS
QRS DURATION - MUSE: 84 MS
QRS DURATION - MUSE: 84 MS
QT - MUSE: 286 MS
QT - MUSE: 392 MS
QTC - MUSE: 441 MS
QTC - MUSE: 452 MS
R AXIS - MUSE: -4 DEGREES
R AXIS - MUSE: 5 DEGREES
RBC # BLD AUTO: 3.36 10E6/UL (ref 3.8–5.2)
RBC # BLD AUTO: 3.5 10E6/UL (ref 3.8–5.2)
RBC MORPH BLD: ABNORMAL
SYSTOLIC BLOOD PRESSURE - MUSE: NORMAL MMHG
SYSTOLIC BLOOD PRESSURE - MUSE: NORMAL MMHG
T AXIS - MUSE: 141 DEGREES
T AXIS - MUSE: 21 DEGREES
VENTRICULAR RATE- MUSE: 143 BPM
VENTRICULAR RATE- MUSE: 80 BPM
WBC # BLD AUTO: 11.6 10E3/UL (ref 4–11)
WBC # BLD AUTO: 12.2 10E3/UL (ref 4–11)

## 2022-04-07 PROCEDURE — 99207 PR CDG-MDM COMPONENT: MEETS HIGH - UP CODED: CPT | Performed by: PHYSICIAN ASSISTANT

## 2022-04-07 PROCEDURE — 250N000013 HC RX MED GY IP 250 OP 250 PS 637: Performed by: HOSPITALIST

## 2022-04-07 PROCEDURE — 250N000011 HC RX IP 250 OP 636: Performed by: SURGERY

## 2022-04-07 PROCEDURE — 85025 COMPLETE CBC W/AUTO DIFF WBC: CPT | Performed by: PHYSICIAN ASSISTANT

## 2022-04-07 PROCEDURE — 92526 ORAL FUNCTION THERAPY: CPT | Mod: GN | Performed by: SPEECH-LANGUAGE PATHOLOGIST

## 2022-04-07 PROCEDURE — 99233 SBSQ HOSP IP/OBS HIGH 50: CPT | Performed by: PHYSICIAN ASSISTANT

## 2022-04-07 PROCEDURE — 120N000001 HC R&B MED SURG/OB

## 2022-04-07 PROCEDURE — 250N000013 HC RX MED GY IP 250 OP 250 PS 637: Performed by: SURGERY

## 2022-04-07 PROCEDURE — 82565 ASSAY OF CREATININE: CPT | Performed by: SURGERY

## 2022-04-07 PROCEDURE — 94640 AIRWAY INHALATION TREATMENT: CPT | Mod: 76

## 2022-04-07 PROCEDURE — 250N000013 HC RX MED GY IP 250 OP 250 PS 637: Performed by: PHYSICIAN ASSISTANT

## 2022-04-07 PROCEDURE — 36415 COLL VENOUS BLD VENIPUNCTURE: CPT | Performed by: SURGERY

## 2022-04-07 PROCEDURE — 999N000157 HC STATISTIC RCP TIME EA 10 MIN

## 2022-04-07 PROCEDURE — 36415 COLL VENOUS BLD VENIPUNCTURE: CPT | Performed by: PHYSICIAN ASSISTANT

## 2022-04-07 PROCEDURE — G0463 HOSPITAL OUTPT CLINIC VISIT: HCPCS

## 2022-04-07 PROCEDURE — 250N000011 HC RX IP 250 OP 636: Performed by: SPECIALIST

## 2022-04-07 PROCEDURE — 94660 CPAP INITIATION&MGMT: CPT

## 2022-04-07 PROCEDURE — 250N000009 HC RX 250: Performed by: SURGERY

## 2022-04-07 PROCEDURE — 250N000011 HC RX IP 250 OP 636: Performed by: PHYSICIAN ASSISTANT

## 2022-04-07 RX ADMIN — Medication 1 CAPSULE: at 21:55

## 2022-04-07 RX ADMIN — ACETAMINOPHEN 650 MG: 325 TABLET, FILM COATED ORAL at 08:27

## 2022-04-07 RX ADMIN — IPRATROPIUM BROMIDE AND ALBUTEROL SULFATE 3 ML: .5; 3 SOLUTION RESPIRATORY (INHALATION) at 15:51

## 2022-04-07 RX ADMIN — CYANOCOBALAMIN TAB 1000 MCG 1000 MCG: 1000 TAB at 08:28

## 2022-04-07 RX ADMIN — PANTOPRAZOLE SODIUM 40 MG: 40 TABLET, DELAYED RELEASE ORAL at 16:05

## 2022-04-07 RX ADMIN — SIMVASTATIN 40 MG: 40 TABLET, FILM COATED ORAL at 21:55

## 2022-04-07 RX ADMIN — FERROUS SULFATE TAB 325 MG (65 MG ELEMENTAL FE) 325 MG: 325 (65 FE) TAB at 08:28

## 2022-04-07 RX ADMIN — FUROSEMIDE 20 MG: 10 INJECTION, SOLUTION INTRAVENOUS at 16:05

## 2022-04-07 RX ADMIN — HYDROMORPHONE HYDROCHLORIDE 4 MG: 2 TABLET ORAL at 21:55

## 2022-04-07 RX ADMIN — HYDROMORPHONE HYDROCHLORIDE 0.2 MG: 0.2 INJECTION, SOLUTION INTRAMUSCULAR; INTRAVENOUS; SUBCUTANEOUS at 12:30

## 2022-04-07 RX ADMIN — ASPIRIN 81 MG CHEWABLE TABLET 81 MG: 81 TABLET CHEWABLE at 20:13

## 2022-04-07 RX ADMIN — HYDROMORPHONE HYDROCHLORIDE 0.2 MG: 0.2 INJECTION, SOLUTION INTRAMUSCULAR; INTRAVENOUS; SUBCUTANEOUS at 12:44

## 2022-04-07 RX ADMIN — AMPICILLIN SODIUM AND SULBACTAM SODIUM 3 G: 2; 1 INJECTION, POWDER, FOR SOLUTION INTRAMUSCULAR; INTRAVENOUS at 05:55

## 2022-04-07 RX ADMIN — Medication 1 PACKET: at 20:14

## 2022-04-07 RX ADMIN — ACETAMINOPHEN 650 MG: 325 TABLET, FILM COATED ORAL at 16:22

## 2022-04-07 RX ADMIN — IPRATROPIUM BROMIDE AND ALBUTEROL SULFATE 3 ML: .5; 3 SOLUTION RESPIRATORY (INHALATION) at 07:36

## 2022-04-07 RX ADMIN — POTASSIUM CHLORIDE 40 MEQ: 1500 TABLET, EXTENDED RELEASE ORAL at 08:27

## 2022-04-07 RX ADMIN — Medication 250 MCG: at 08:28

## 2022-04-07 RX ADMIN — Medication 1 PACKET: at 08:29

## 2022-04-07 RX ADMIN — PANTOPRAZOLE SODIUM 40 MG: 40 TABLET, DELAYED RELEASE ORAL at 08:28

## 2022-04-07 RX ADMIN — ROPINIROLE HYDROCHLORIDE 0.5 MG: 0.5 TABLET, FILM COATED ORAL at 12:30

## 2022-04-07 RX ADMIN — GABAPENTIN 400 MG: 300 CAPSULE ORAL at 16:05

## 2022-04-07 RX ADMIN — POTASSIUM CHLORIDE 20 MEQ: 1500 TABLET, EXTENDED RELEASE ORAL at 20:13

## 2022-04-07 RX ADMIN — AMPICILLIN SODIUM AND SULBACTAM SODIUM 3 G: 2; 1 INJECTION, POWDER, FOR SOLUTION INTRAMUSCULAR; INTRAVENOUS at 18:18

## 2022-04-07 RX ADMIN — IPRATROPIUM BROMIDE AND ALBUTEROL SULFATE 3 ML: .5; 3 SOLUTION RESPIRATORY (INHALATION) at 19:20

## 2022-04-07 RX ADMIN — GABAPENTIN 400 MG: 300 CAPSULE ORAL at 08:28

## 2022-04-07 RX ADMIN — SENNOSIDES AND DOCUSATE SODIUM 1 TABLET: 50; 8.6 TABLET ORAL at 20:13

## 2022-04-07 RX ADMIN — Medication 1 CAPSULE: at 08:34

## 2022-04-07 RX ADMIN — SPIRONOLACTONE 25 MG: 25 TABLET ORAL at 08:28

## 2022-04-07 RX ADMIN — MULTIPLE VITAMINS W/ MINERALS TAB 1 TABLET: TAB at 08:28

## 2022-04-07 RX ADMIN — ROPINIROLE HYDROCHLORIDE 1 MG: 0.5 TABLET, FILM COATED ORAL at 22:43

## 2022-04-07 RX ADMIN — AMPICILLIN SODIUM AND SULBACTAM SODIUM 3 G: 2; 1 INJECTION, POWDER, FOR SOLUTION INTRAMUSCULAR; INTRAVENOUS at 12:30

## 2022-04-07 RX ADMIN — GABAPENTIN 400 MG: 300 CAPSULE ORAL at 21:55

## 2022-04-07 RX ADMIN — B-COMPLEX W/ C & FOLIC ACID TAB 1 TABLET: TAB at 08:28

## 2022-04-07 RX ADMIN — BUPROPION HYDROCHLORIDE 150 MG: 150 TABLET, EXTENDED RELEASE ORAL at 08:28

## 2022-04-07 RX ADMIN — FUROSEMIDE 20 MG: 10 INJECTION, SOLUTION INTRAVENOUS at 08:26

## 2022-04-07 RX ADMIN — IPRATROPIUM BROMIDE AND ALBUTEROL SULFATE 3 ML: .5; 3 SOLUTION RESPIRATORY (INHALATION) at 11:55

## 2022-04-07 RX ADMIN — CITALOPRAM HYDROBROMIDE 40 MG: 20 TABLET, FILM COATED ORAL at 08:27

## 2022-04-07 RX ADMIN — ROPINIROLE HYDROCHLORIDE 0.5 MG: 0.5 TABLET, FILM COATED ORAL at 08:27

## 2022-04-07 ASSESSMENT — ACTIVITIES OF DAILY LIVING (ADL)
ADLS_ACUITY_SCORE: 14
ADLS_ACUITY_SCORE: 15
ADLS_ACUITY_SCORE: 14
ADLS_ACUITY_SCORE: 14
ADLS_ACUITY_SCORE: 15
ADLS_ACUITY_SCORE: 14
ADLS_ACUITY_SCORE: 11
ADLS_ACUITY_SCORE: 14
ADLS_ACUITY_SCORE: 14
ADLS_ACUITY_SCORE: 15
ADLS_ACUITY_SCORE: 15
ADLS_ACUITY_SCORE: 14
ADLS_ACUITY_SCORE: 15
ADLS_ACUITY_SCORE: 14
ADLS_ACUITY_SCORE: 15

## 2022-04-07 NOTE — CONSULTS
[facility]  WOC Nurse Inpatient Wound Assessment     Reason for consultation: Evaluate and treat perineal area     Assessment  Perineal area, inner thighs, gluteal cleft: IAD (incontinence-associated dermatitis) and MASD (moisture-associated skin damage), related to urinary and fecal incontinence and chafing/sweating in skin folds.  No obvious pressure injury at this time.     (RLE managed by Vascular Surgery and Wound clinic; WOC not involved)    Treatment Plan  Perineal cares (groin, inner thighs, gluteal cleft): every shift and prn incontinence:  1.  Cleanse all areas with generous amount Rivas perineal lotion and soft dry wipes.  Spray the Rivas directly on the skin.  Avoid use of the blue bag wipes in devon area.   2.  Apply antifungal powder (ask MD to order) to skin folds, inner thighs, gluteal cleft.  Spread the powder evenly and thinly with a dry cloth.    3.  Apply Critic-aid barrier paste along the gluteal cleft and perianal areas only.    *Maximize air flow to area.  Keep legs apart when able, minimize linens, consider air mattress.  *PIP measures.  Reposition pt every 1-2 hrs.  Keep HOB as low as tolerated.   *Continue PureWick as needed when resting/sleeping.     If above protocol not effective, might consider use of Cavilon Advanced to inner thigh breakdown in future.  For now will trial the Rivas lotion to help condition and protect the skin, and the AF powder to prevent further rash development, to dry out the overly moist areas, and to minimize chafing.      Orders Written  Recommended provider order: Antifungal powder  WOC Nurse follow-up plan: weekly  Nursing to notify the Provider(s) and re-consult the WOC Nurse if wound(s) deteriorates or new skin concern.    Patient History  According to provider note(s):  Elizabeth Kelley is a 76 year old female with PMHx MS, chronic BLE lymphedema with venous insufficiency and chronic R leg cellulitis, GERD, hyperlipidemia, and  depression who underwent previously scheduled excisional debridement of RLE cicumferential venous hypertensive ulceration and application of wound vac on 3/21/22. Hospitalist service consulted 3/23/22 for AMS ultimately determined to have sepsis 2/2 CAP with respiratory failure. She had been progressing well, completing antibiotic regimen and weaned to room air with worsening systemic evidence of infection and hypoxia on 4/3, resumed on broad-spectrum antibiotics and imaging suggestive of bilateral pulmonary infiltrates. Also with new diagnosis of afib with RVR requiring IV medications, transition to CCU.     Objective Data  Containment of urine/stool: Incontinence Protocol, pull-on briefs, purewick, toilet as able    Active Diet Order:  Orders Placed This Encounter      Combination Diet Regular Diet; Mildly Thick (level 2) (No straws)    Output:   I/O last 3 completed shifts:  In: 430 [P.O.:100]  Out: 550 [Urine:550]    Risk Assessment:   Sensory Perception: 4-->no impairment  Moisture: 2-->very moist  Activity: 3-->walks occasionally  Mobility: 3-->slightly limited  Nutrition: 3-->adequate  Friction and Shear: 1-->problem  Ajith Score: 16                          Labs: Recent Labs   Lab 04/07/22  1042 04/05/22  1215 04/04/22  0523   ALBUMIN  --   --  1.9*   HGB 9.2*   < > 7.3*   WBC 12.2*   < > 30.1*   A1C  --   --  5.4    < > = values in this interval not displayed.       Physical Exam  Skin inspection: focused perineal areas    Wound Location:  Gluteal cleft  Date of last photo:  4-7-22 gluteal cleft      Wound History: pt incontinent of urine and stool.  Frequent dribbling especially when coughs.  Using purewick at night, trying to get up to toilet during the day.  Pt obese and edematous with deep skin folds, trapping moisture.  Able to turn well with assist x 1-2.    Measurements (length x width x depth, in cm):   approx 5 x 0.3 x 0.1cm   Wound Base: pink moist tissue  Tunneling: N/A  Undermining:  N/A  Palpation of the wound bed: normal   Periwound skin: intact and erythema- blanchable  Color: pink  Temperature: normal   Drainage: scant serous weeping  Odor: none  Pain: mild    Wound Location:  Inner thighs  Date of last photo:  4-7-22       Wound History: pt incontinent of urine and stool.  Frequent dribbling especially when coughs.  Using purewick at night, trying to get up to toilet during the day.  Pt obese and edematous with deep skin folds, trapping moisture.  Able to turn well with assist x 1-2.    Measurements (length x width x depth, in cm): scattered areas, approx 10 x 10cm each side, no depth  Wound Base: pink lightly denuded peeling tissue   Palpation of the wound bed: normal   Periwound skin: pink, peely  Color: pink  Temperature: normal   Drainage: scant serous weeping  Odor: none  Pain: mild    Interventions  Current support surface: Standard  Atmos Air mattress  Current off-loading measures: Pillows  Visual inspection of wound(s) completed  Wound Care: done per plan of care  Supplies: reviewed  Education provided: importance of repositioning, plan of care, wound progress, Moisture management and Off-loading pressure  Discussed plan of care with Patient, Family and Nurse    Vida Badillo RN, CWOCN

## 2022-04-07 NOTE — PROGRESS NOTES
[facility]    Medicine Progress Note - Hospitalist Service    Date of Admission:  3/21/2022    Assessment & Plan               Elizabeth Kelley is a 76 year old female with PMHx MS, chronic BLE lymphedema with venous insufficiency and chronic R leg cellulitis, GERD, hyperlipidemia, and depression who underwent previously scheduled excisional debridement of RLE cicumferential venous hypertensive ulceration and application of wound vac on 3/21/22. Hospitalist service consulted 3/23/22 for AMS ultimately determined to have sepsis 2/2 CAP with respiratory failure. She had been progressing well, completing antibiotic regimen and weaned to room air with worsening systemic evidence of infection and hypoxia on 4/3, resumed on broad-spectrum antibiotics and imaging suggestive of bilateral pulmonary infiltrates. Also with new diagnosis of afib with RVR requiring IV medications, transition to CCU.      Sepsis secondary to CAP vs aspiration pneumonia, recurrent  Acute hypoxic respiratory failure secondary to above, improving  Hx of MRSA  * 3/23 Tmax 101.5, tachycardic in the 110s, hypoxic to 86% on room air. WBC 18.3, procal 0.17. CXR with patchy airspace opacities in the left mid and lower lung, suspicious for pneumonia. Treated with Zosyn for CAP, repeat CXR 3/28 with new right upper lobe infiltrate and basilar infiltrate lateral views. Received diuretics for volume overload, transitioned to Augmentin on 3/30 with plans for 14 day course to treat LE cellulitis.  * 4/3 with fever to 102, uptrending WBC with mildly elevated procalcitonin. C/O SOB, noted to be hypoxic. COVID-19, RSV, Influenza PCR neg. UA neg. CXR on 4/2 neg. LE wound valuated by vascular surgery, no evidence of infection. CTA Chest neg for PE, with bilateral pulmonary infiltrates L>R, most consistent with infectious etiology. Blood cultures x2 repeated. Resumed IV Zosyn, Vanco. ID consulted.   * 4/5 Video swallow negative, cleared for  reg diet with instructions to sit upright, speech continuing to follow  * 4/6 Weaned to 1L O2. I/O appear innacurate as net value -18L, according to daily weights is +15 lbs with reported weight gain of 2lbs overnight.  * Weight 284--279  - Zosyn transitioned to Unasyn on 4/5  - ID consulted, appreciate input, plan for Abx until 4/8  - Follow blood cultures, NGTD  - Sputum culture as able  - RCAT consulted, encourage pulmonary toilet with IS and acapella.  - Duonebs q4 hrs while awake, PRN albuterol nebs.  - Monitor fluid status.  - IV diuresis with lasix 20mg BID, continue one additional day  - Strict I&O  - Resumed PTA Aldactone  - Robitussin, tessalon perles prn.  - SLP following  - BMP in AM    Afib with RVR, new diagnosis  NSTEMI  Overnight 4/4 with RRT for development of afib with RVR, rates 130s-150s. In setting of sepsis. EKG with marked ST depression and TWI in lateral leads. Received IV diltiazem bolus with plan to transition to drip, developed three 3-second pauses with conversion to SR. Initiated on heparin drip. Remains in NSR this AM.  * Troponin 17--98  * Lexiscan stress negative for inducible ischemia  * TTE with LVEF 60-65%, no WMAs, no valvular dz, mildly elevated PA pressure  - Cardiology consulted, ischemic workup neg, no indication for AC at this time given setting of sepsis. If afib recurs, would rec AC and tx with amiodarone rather than diltiazem  - Tele     Encephalopathy, multifactorial, resolved  Altered mentation noted by nursing during hosptialization. Multifactorial in the setting of fever, narcotic pain medications, suspected underlying BOLIVAR, possible dehydration. Received dose of narcan, but did not significantly change pts mentation immediately, though seemed to improve within the hour after IVF bolus was near completion. Mental status continues to improved.  - Continue expectant management.  - Bipap prn.  - Suspect underlying BOLIVAR, urged her to get evaluated as outpatient.  - Resumed  PTA Wellbutrin, Requip as per the patient's request but will closely monitor mentation; low threshold to hold these meds if she becomes confused/lethargic.  - Discontinue PTA lyrica, will increase gabapentin by 100mg      Acute blood loss anemia  Chronic normocytic anemia  Baseline 10-11. Down to 8.3 post-procedure. No active bleeding, some bleeding from wound bed with dressing changes. Could be dilutional as checked while receiving IVF bolus   *Received 1u PRBCs 3/25, 3/27, 4/6 for Hgb < 7  *Hgb 9.2  - Additional 1u PRBCs for Hgb < 7  - Discontinued scheduled Celebrex  - Increase PPI proph to BID  - Iron studies suggestive of iron deficiency, started on oral iron supplement.     Hematuria, resolved  Noted in AM on 3/30/22. Likely 2/2 trauma/irritation from garrison catheter. Has been on empiric Abx, UA 4/1 neg. Resolved as of 3/31. Garrison removed 4/1.     Chronic BLE lymphedema with venous insufficiency and chronic R leg cellulitis   S/P excisional debridement of RLE cicumferential venous hypertensive ulceration and application of wound vac (3/21/22)  S/P intraoperative wound examination, wound debridement, and application of myriad 2 layer graft on 3/28  - Defer routine post-operative cares including wound cares to Dr. Bullock.  - Judicious pain control.  - Gabapentin 400 mg po TID.  - PTA Lyrica discontinued 4/5 as above  - Continue PTA aldactone and potassium supplement  - IV diuresis as above     Severe Obesity, BMI 50.81  - Follow up with PCP.      MS  Ambulatory at baseline, though some weakness in lower extremities and intermittent upper extremity weakness.     GERD  - Continue PTA Protonix.     Hyperlipidemia  - Continue PTA simvastatin.     Depression  Resumed PTA Celexa, Wellbutrin and Lyrica on 3/26/22. Had been held for a few days given AMS, resumed when mentation back to baseline. As above, weaned lyrica off and solely on gabapentin 4/5.     RLS  - Continue PTA Requip.     Diet: Combination Diet Regular  Diet; Mildly Thick (level 2) (No straws)    DVT Prophylaxis: Pneumatic Compression Devices and Ambulate every shift  Amato Catheter: Not present  Central Lines: None  Cardiac Monitoring: ACTIVE order. Indication: AMI (NSTEMI/ STEMI) (48 hours)  Code Status: Full Code      Disposition Plan   Expected Discharge: 04/10/2022     Anticipated discharge location: home with family    Delays:     Other (Add Comment)            The patient's care was discussed with the Attending Physician, Dr. Zacarias, Bedside Nurse, Care Coordinator/ and Patient.    Avel Silverio PA-C  Hospitalist Service  [facility]  Securely message with the Vocera Web Console (learn more here)  Text page via [employer] Paging/Directory         Clinically Significant Risk Factors Present on Admission                    ______________________________________________________________________    Interval History   Pt sitting on toilet at time of evaluation, nursing noted significant perineal excoriation due to incontinence. Also with choking episode yesterday with lunch, speech continuing to follow. Afebrile. Breathing remains same, down to 1L.    Data reviewed today: I reviewed all medications, new labs and imaging results over the last 24 hours. I personally reviewed no images or EKG's today.    Physical Exam   Vital Signs: Temp: 98.3  F (36.8  C) Temp src: Oral BP: 137/61 Pulse: (P) 77   Resp: (P) 20 SpO2: (P) 96 % O2 Device: (P) Nasal cannula Oxygen Delivery: (P) 1 LPM  Weight: 279 lbs 9.6 oz     GEN: well-developed, well-nourished, appears comfortable  HEENT: NCAT, EOM intact bilaterally, sclera clear, conjunctiva normal, mucous membranes moist  CHEST: lungs CTA bilaterally, fine crackles in bilateral bases, no wheeze, no increased work of breathing  HEART: RRR, S1 & S2  ABD: soft, nontender, obese, no guarding or rigidity  MSK: AROM bilateral UE/LE  NEURO: awake, alert, oriented to name, place, and time. CN II-XII grossly  intact.   SKIN: warm & dry without rash, R lower leg with chux dressing in place, no surrounding erythema, bilateral lower leg edema persists    Data   Recent Labs   Lab 04/07/22  1042 04/07/22  0537 04/06/22  0544 04/05/22  1215 04/05/22  0804 04/05/22  0450 04/04/22  1356 04/04/22  0523   WBC 12.2*  --  11.1* 22.1*  --   --   --  30.1*   HGB 9.2*  --  6.7* 7.7*  --   --   --  7.3*   MCV 88  --  90 90  --   --   --  87   PLT  --   --  376 385  --   --   --  395   NA  --   --  136  --  137  --   --  136   POTASSIUM  --   --  4.3  --  4.1  4.2  --  3.6 3.2*   CHLORIDE  --   --  102  --  101  --   --  100   CO2  --   --  31  --  29  --   --  33*   BUN  --   --  28  --  27  --   --  32*   CR  --  0.94 0.92  --  0.94   < >  --  1.03   ANIONGAP  --   --  3  --  7  --   --  3   JUNI  --   --  9.2  --  9.1  --   --  9.4   GLC  --   --  108*  --  115*  --   --  130*   ALBUMIN  --   --   --   --   --   --   --  1.9*   PROTTOTAL  --   --   --   --   --   --   --  6.6*   BILITOTAL  --   --   --   --   --   --   --  0.4   ALKPHOS  --   --   --   --   --   --   --  279*   ALT  --   --   --   --   --   --   --  31   AST  --   --   --   --   --   --   --  26    < > = values in this interval not displayed.     No results found for this or any previous visit (from the past 24 hour(s)).  Medications     Reason anticoagulant not prescribed for atrial fibrillation       - MEDICATION INSTRUCTIONS -       - MEDICATION INSTRUCTIONS -         ampicillin-sulbactam (UNASYN) IV  3 g Intravenous Q6H     aspirin  81 mg Oral QPM     buPROPion  150 mg Oral QAM     citalopram  40 mg Oral QAM     cyanocobalamin  1,000 mcg Oral Daily     diosmin-hesperidin 450-50  1 capsule Oral BID     ferrous sulfate  325 mg Oral Daily     furosemide  20 mg Intravenous BID     gabapentin  400 mg Oral TID     ipratropium - albuterol 0.5 mg/2.5 mg/3 mL  3 mL Nebulization Q4H While awake     Aleksandr  1 packet Oral BID     multivitamin w/minerals  1 tablet Oral Daily      pantoprazole  40 mg Oral BID AC     polyethylene glycol  17 g Oral Daily     potassium chloride ER  20 mEq Oral QPM     potassium chloride ER  40 mEq Oral QAM     rOPINIRole  0.5 mg Oral BID     rOPINIRole  1 mg Oral At Bedtime     senna-docusate  1 tablet Oral BID     simvastatin  40 mg Oral At Bedtime     sodium chloride (PF)  3 mL Intracatheter Q8H     spironolactone  25 mg Oral Daily     vitamin B complex with vitamin C  1 tablet Oral Daily     Vitamin D3  250 mcg Oral Daily

## 2022-04-07 NOTE — PLAN OF CARE
A&OX4, forgetful, BIPAP overnight, nonproductive cough. Tele- SR,  up with assist of 1 with walker, on regular diet. Unasyn  q6,  contact isolation for MRSA

## 2022-04-07 NOTE — PROGRESS NOTES
"[facility] [place] Nurse Note    Subject: Elizabeth Kelley was evaluated for wound bleeding with dressing change. Called and spoke to Dr. Staples, it was requested that we go evaluate. Undressed the wound. Multiple remaining staples in place around entire wound. These when manipulated caused local bleeding at the site. Removed all visual staples after pain medication was given by staff nurse.     Exam:  /61   Pulse (P) 77   Temp 98.3  F (36.8  C)   Resp (P) 20   Ht 1.549 m (5' 1\")   Wt 126.8 kg (279 lb 9.6 oz)   SpO2 (P) 96%   BMI 52.83 kg/m      Non-selective debridement was performed, no additional bleeding occurred. Patient tolerated procedure well.  Wound was redressed with vashe damp gauze, followed by roll gauze and secured with tape.    Plan: Discussed with Dr. Staples. He supports decreasing dressing change to twice a day for TCU placement. Daily is not enough.   Orders Right Lower Leg: Remove previous dressings.   Cleanse wounds with vashe moist gauze. Wipe Dry. Apply Cavilon skin prep wipes around entire periwound skin. Apply   Vashe moist kerlex gauze wrap or fluffs, secure with dry roll gauze/kerlix. Secure with tape. Change dressing BID  Fariba Burch, ALBARON, RN, CWOCN  April 7, 2022                            Further instructions from your care team       Elevate right ankle above hip at all times except when ambulating, critical to assisting and increased healing of right lower extremity ulceration in preparation for split-thickness skin graft.      "

## 2022-04-07 NOTE — PROGRESS NOTES
Care Management Follow Up    Length of Stay (days): 16    Expected Discharge Date: 04/10/2022     Concerns to be Addressed:       Patient plan of care discussed at interdisciplinary rounds: Yes    Anticipated Discharge Disposition: Transitional Care     Anticipated Discharge Services: None  Anticipated Discharge DME:      Patient/family educated on Medicare website which has current facility and service quality ratings:    Education Provided on the Discharge Plan:    Patient/Family in Agreement with the Plan: yes    Referrals Placed by CM/SW:    Private pay costs discussed: transportation costs    Additional Information:  Met with patient and daughter Carolyn regarding patient potentially being appropriate/eligible for LTACH.  Reviewed what LTACH is and that we have 2 in [facility].  Carolyn prefers referral be sent to [facility] first and 2nd choice would be [facility].  Writer did speak with Naresh Luu for [facility] and he reviewed patient's chart and stated that she is appropriate for [facility] pending insurance auth.  Referral made to [facility] LTACH via [facility] and Rosa Maria the liaison notified of referral.    Michelle Shah RN  Care Coordinator  [facility]  [phone] (text or call)

## 2022-04-07 NOTE — PLAN OF CARE
Goal Outcome Evaluation:    Plan of Care Reviewed With: patient, daughter      Transfer to medical floor. Patient is up with walker and assist, up and anatoliy 6 times this morning, Tolerated breakfast, gave Dilaudid IV twice for wound care nurse took staples out. Daughter at bedside explained plan. VSS.

## 2022-04-07 NOTE — PLAN OF CARE
Goal Outcome Evaluation:  Pt is A&Ox4, forgetful, VSS and on tele SR, on 1 L NC and IS and cappella encouraged, has dry non productive cough and Robitussin and Tessalon given, R leg dressing changed, pt is up with 1 assist RW and GB. Unasyn  3g/100 ml every 6 hrs.    Plan of Care Reviewed With: patient, daughter     Overall Patient Progress: no change

## 2022-04-07 NOTE — PROVIDER NOTIFICATION
MD Notification    Notified Person: MD    Notified Person Name:  Catia     Notification Date/Time: 4/6/2022 at 1945    Notification Interaction:awaiting order  Purpose of Notification: pt with new pneumonia has bad cough, Robitussin 10 ml given but still coughs, asking for Tessalon.     Orders Received:    Comments:

## 2022-04-07 NOTE — PROGRESS NOTES
[facility]    Infectious Disease Progress Note    Date of Service : 04/07/2022     Assessment:  76YF with morbid obesity who has been hospitalized since 3/21 with venous ulcers, RLE cellulitis and is s/p excisional debridement of RLE ulcer with wound vac. Now with fever and marked leukocytosis of 30K likely related to aspiration pneumonia/pneumonitis , covid/influenza negative. She has no other focal complaints, no diarrhea to suggest C.diff, CT abdomen is non focal and wound appears stable. Improving.     -Aspiration pneumonia/pneumonitis  -Chronic LE ulcer s/p debridement  -Morbidly obese body habitus  -Atrial fibrillation with RVR, demand ischemia       Recommendations  1. Continue Unasyn   2. Check CXR in am  3. Follow WBC    Lisette Chow MD    Interval History   Sitting out in bed eating breakfast. Feels better overall    Physical Exam   Temp: 98.3  F (36.8  C) Temp src: Oral BP: (!) 143/78 Pulse: 66   Resp: 18 SpO2: 94 % O2 Device: Nasal cannula Oxygen Delivery: 2 LPM  Vitals:    04/04/22 0413 04/05/22 0500 04/06/22 0500   Weight: 130 kg (286 lb 9.6 oz) 128.3 kg (282 lb 14.4 oz) 129 kg (284 lb 6.3 oz)     Vital Signs with Ranges  Temp:  [97.9  F (36.6  C)-99.4  F (37.4  C)] 98.3  F (36.8  C)  Pulse:  [66-88] 66  Resp:  [13-20] 18  BP: (111-159)/() 143/78  SpO2:  [91 %-98 %] 94 %    Constitutional: Awake, alert, cooperative, no apparent distress, sitting in chair  Lungs: No wheeze, anterior auscultation clear  Cardiovascular: Regular rate and rhythm, normal S1 and S2, and no murmur noted  Abdomen: Normal bowel sounds, soft, non-distended, non-tender  Skin:LE dressings in place    Other:    Medications     Reason anticoagulant not prescribed for atrial fibrillation       - MEDICATION INSTRUCTIONS -       - MEDICATION INSTRUCTIONS -         ampicillin-sulbactam (UNASYN) IV  3 g Intravenous Q6H     aspirin  81 mg Oral QPM     buPROPion  150 mg Oral QAM     citalopram  40 mg Oral QAM      cyanocobalamin  1,000 mcg Oral Daily     diosmin-hesperidin 450-50  1 capsule Oral BID     ferrous sulfate  325 mg Oral Daily     furosemide  20 mg Intravenous BID     gabapentin  400 mg Oral TID     ipratropium - albuterol 0.5 mg/2.5 mg/3 mL  3 mL Nebulization Q4H While awake     Aleksandr  1 packet Oral BID     multivitamin w/minerals  1 tablet Oral Daily     pantoprazole  40 mg Oral BID AC     polyethylene glycol  17 g Oral Daily     potassium chloride ER  20 mEq Oral QPM     potassium chloride ER  40 mEq Oral QAM     rOPINIRole  0.5 mg Oral BID     rOPINIRole  1 mg Oral At Bedtime     senna-docusate  1 tablet Oral BID     simvastatin  40 mg Oral At Bedtime     sodium chloride (PF)  3 mL Intracatheter Q8H     spironolactone  25 mg Oral Daily     vitamin B complex with vitamin C  1 tablet Oral Daily     Vitamin D3  250 mcg Oral Daily       Data   All microbiology laboratory data reviewed.  Recent Labs   Lab Test 04/06/22  0544 04/05/22  1215 04/04/22  0523   WBC 11.1* 22.1* 30.1*   HGB 6.7* 7.7* 7.3*   HCT 23.0* 26.5* 24.4*   MCV 90 90 87    385 395     Recent Labs   Lab Test 04/07/22  0537 04/06/22  0544 04/05/22  0804   CR 0.94 0.92 0.94     Recent Labs   Lab Test 10/20/21  0835   SED 55*       Imaging  3/21 CT chest PE /Abdomen pelvis  EXAM: CT CHEST PE ABDOMEN PELVIS W CONTRAST  LOCATION: [facility]  DATE/TIME: 4/3/2022 5:29 PM     INDICATION: PE suspected, low/intermediate probability, positive D-dimer. Abdominal Pain. Leukocytosis. Fever.  COMPARISON: Chest x-rays dated 04/02/2022 and CT pelvis dated 09/14/2019.  TECHNIQUE: CT chest pulmonary angiogram and routine CT abdomen pelvis with IV contrast. Arterial phase through the chest and venous phase through the abdomen and pelvis. Multiplanar reformats and MIP reconstructions were performed. Dose reduction   techniques were used.   CONTRAST: 135 mL [id number].     FINDINGS:  ANGIOGRAM CHEST: Pulmonary arteries are normal  caliber and negative for pulmonary emboli. Thoracic aorta is negative for dissection. No CT evidence of right heart strain.      LUNGS AND PLEURA: Airspace infiltrates in all lobes of both lungs, worst in the lower lobes of the bilateral lower lungs with the largest most confluent consolidation in the left lower lung lobe and the next largest in the medial right lower lung lobe.   These areas could obscure nodularity. Lungs are otherwise grossly clear. No pneumothorax or significant right pleural fluid collection is identified. There is a small left pleural effusion.     MEDIASTINUM/AXILLAE: The heart is normal in size. There is a moderate to large size hiatal hernia containing most of the gastric cardia and fundus. No mediastinal, hilar, or axillary lymphadenopathy is identified although there are mildly prominent AP   window lymph nodes measuring up to 0.8 cm in short axis, pretracheal lymph nodes measuring up to 0.9 cm in short axis, subcarinal lymph node measuring up to 0.9 cm in short axis, right hilar lymph nodes measuring up to almost 1 cm in short axis and left   hilar lymph nodes are somewhat difficult to evaluate given the consolidation in the left lower lung lobe. Visualized portions of the thyroid are unremarkable. Thoracic aorta is of normal caliber. There are few atherosclerotic calcifications in the aorta.     CORONARY ARTERY CALCIFICATION: No significant coronary artery calcifications are identified.     HEPATOBILIARY: The liver enhances normally without focal lesion, biliary ductal dilatation or choledocholithiasis. Gallbladder is grossly normal in appearance without evidence for cholelithiasis.     PANCREAS: Normal.     SPLEEN: Normal.     ADRENAL GLANDS: Normal.     KIDNEYS/BLADDER: Normal.     BOWEL: There are scattered diverticula in the sigmoid and descending colon without pericolonic inflammatory change to suggest acute diverticulitis. A moderate amount of stool is seen in the colon. Appendix  is normal in appearance. Small bowel is of   normal caliber and appearance. The stomach contains a moderate amount of fluid and air. The gastric cardia and fundus is in the posterior mediastinum extending through a moderate to large size hiatal hernia.     LYMPH NODES: Normal.     VASCULATURE: There is mild nonaneurysmal atherosclerosis.     PELVIC ORGANS: Atrophic uterus is grossly unremarkable. Large calcified fibroid is again noted, similar to the prior CT pelvis dated 2019. Ovaries are not well seen. No adnexal masses are identified.     MUSCULOSKELETAL: There are degenerative changes in the spine, shoulders, hips, and right sacroiliac joint. No aggressive osseous lesions or acute osseous fractures are identified.     ADDITIONAL FINDINGS: No free intraperitoneal air or free fluid is identified. There is edema in the adipose tissues around the lateral aspects of the lower abdomen and around the anterior and lateral aspects of the pelvis consistent with mild anasarca.                                                                      IMPRESSION:  1.  Bilateral pulmonary infiltrates have an appearance most consistent with infectious etiology although could also be inflammatory. These are worst in the left lower lung lobe and next worse in the right lower lung lobe. Mildly prominent lymph nodes in   the mediastinum and hilar regions are likely reactive to the pulmonary processes. Follow-up CT chest is recommended after appropriate clinical treatment and resolution of the clinical symptoms to ensure resolution of the pulmonary and mediastinal   findings.  2.  No evidence for pulmonary artery embolism.  3.  Moderate size hiatal hernia with extension of the gastric cardia and a portion of the gastric fundus in the posterior inferior mediastinum.  4.  Mild anasarca.  5.  No other etiology for patient's symptoms is identified.

## 2022-04-08 ENCOUNTER — APPOINTMENT (OUTPATIENT)
Dept: PHYSICAL THERAPY | Facility: CLINIC | Age: 77
DRG: 264 | End: 2022-04-08
Attending: SURGERY
Payer: COMMERCIAL

## 2022-04-08 ENCOUNTER — APPOINTMENT (OUTPATIENT)
Dept: SPEECH THERAPY | Facility: CLINIC | Age: 77
DRG: 264 | End: 2022-04-08
Attending: SURGERY
Payer: COMMERCIAL

## 2022-04-08 LAB
ANION GAP SERPL CALCULATED.3IONS-SCNC: 4 MMOL/L (ref 3–14)
BACTERIA BLD CULT: NO GROWTH
BACTERIA BLD CULT: NO GROWTH
BUN SERPL-MCNC: 29 MG/DL (ref 7–30)
CALCIUM SERPL-MCNC: 9.6 MG/DL (ref 8.5–10.1)
CHLORIDE BLD-SCNC: 102 MMOL/L (ref 94–109)
CO2 SERPL-SCNC: 30 MMOL/L (ref 20–32)
CREAT SERPL-MCNC: 1.03 MG/DL (ref 0.52–1.04)
GFR SERPL CREATININE-BSD FRML MDRD: 57 ML/MIN/1.73M2
GLUCOSE BLD-MCNC: 100 MG/DL (ref 70–99)
POTASSIUM BLD-SCNC: 4.5 MMOL/L (ref 3.4–5.3)
SODIUM SERPL-SCNC: 136 MMOL/L (ref 133–144)

## 2022-04-08 PROCEDURE — 250N000013 HC RX MED GY IP 250 OP 250 PS 637: Performed by: PHYSICIAN ASSISTANT

## 2022-04-08 PROCEDURE — 120N000001 HC R&B MED SURG/OB

## 2022-04-08 PROCEDURE — 999N000157 HC STATISTIC RCP TIME EA 10 MIN

## 2022-04-08 PROCEDURE — 250N000011 HC RX IP 250 OP 636: Performed by: PHYSICIAN ASSISTANT

## 2022-04-08 PROCEDURE — 92526 ORAL FUNCTION THERAPY: CPT | Mod: GN | Performed by: SPEECH-LANGUAGE PATHOLOGIST

## 2022-04-08 PROCEDURE — 94660 CPAP INITIATION&MGMT: CPT

## 2022-04-08 PROCEDURE — 94640 AIRWAY INHALATION TREATMENT: CPT | Mod: 76

## 2022-04-08 PROCEDURE — 36415 COLL VENOUS BLD VENIPUNCTURE: CPT | Performed by: PHYSICIAN ASSISTANT

## 2022-04-08 PROCEDURE — 97116 GAIT TRAINING THERAPY: CPT | Mod: GP | Performed by: PHYSICAL THERAPIST

## 2022-04-08 PROCEDURE — 82310 ASSAY OF CALCIUM: CPT | Performed by: PHYSICIAN ASSISTANT

## 2022-04-08 PROCEDURE — 94640 AIRWAY INHALATION TREATMENT: CPT

## 2022-04-08 PROCEDURE — 250N000009 HC RX 250: Performed by: SURGERY

## 2022-04-08 PROCEDURE — 250N000011 HC RX IP 250 OP 636: Performed by: SPECIALIST

## 2022-04-08 PROCEDURE — 250N000013 HC RX MED GY IP 250 OP 250 PS 637: Performed by: SURGERY

## 2022-04-08 PROCEDURE — 97530 THERAPEUTIC ACTIVITIES: CPT | Mod: GP | Performed by: PHYSICAL THERAPIST

## 2022-04-08 PROCEDURE — 99207 PR CDG-MDM COMPONENT: MEETS HIGH - UP CODED: CPT | Performed by: PHYSICIAN ASSISTANT

## 2022-04-08 PROCEDURE — 250N000013 HC RX MED GY IP 250 OP 250 PS 637: Performed by: HOSPITALIST

## 2022-04-08 PROCEDURE — 99233 SBSQ HOSP IP/OBS HIGH 50: CPT | Performed by: PHYSICIAN ASSISTANT

## 2022-04-08 RX ORDER — AMPICILLIN AND SULBACTAM 2; 1 G/1; G/1
3 INJECTION, POWDER, FOR SOLUTION INTRAMUSCULAR; INTRAVENOUS EVERY 6 HOURS
Status: COMPLETED | OUTPATIENT
Start: 2022-04-08 | End: 2022-04-08

## 2022-04-08 RX ADMIN — Medication 1 PACKET: at 21:35

## 2022-04-08 RX ADMIN — ACETAMINOPHEN 650 MG: 325 TABLET, FILM COATED ORAL at 11:33

## 2022-04-08 RX ADMIN — IPRATROPIUM BROMIDE AND ALBUTEROL SULFATE 3 ML: .5; 3 SOLUTION RESPIRATORY (INHALATION) at 15:09

## 2022-04-08 RX ADMIN — ROPINIROLE HYDROCHLORIDE 0.5 MG: 0.5 TABLET, FILM COATED ORAL at 08:55

## 2022-04-08 RX ADMIN — ACETAMINOPHEN 650 MG: 325 TABLET, FILM COATED ORAL at 06:24

## 2022-04-08 RX ADMIN — AMPICILLIN SODIUM AND SULBACTAM SODIUM 3 G: 2; 1 INJECTION, POWDER, FOR SOLUTION INTRAMUSCULAR; INTRAVENOUS at 00:13

## 2022-04-08 RX ADMIN — FERROUS SULFATE TAB 325 MG (65 MG ELEMENTAL FE) 325 MG: 325 (65 FE) TAB at 08:54

## 2022-04-08 RX ADMIN — ACETAMINOPHEN 650 MG: 325 TABLET, FILM COATED ORAL at 17:46

## 2022-04-08 RX ADMIN — PANTOPRAZOLE SODIUM 40 MG: 40 TABLET, DELAYED RELEASE ORAL at 16:30

## 2022-04-08 RX ADMIN — AMPICILLIN SODIUM AND SULBACTAM SODIUM 3 G: 2; 1 INJECTION, POWDER, FOR SOLUTION INTRAMUSCULAR; INTRAVENOUS at 18:56

## 2022-04-08 RX ADMIN — Medication 1 CAPSULE: at 21:36

## 2022-04-08 RX ADMIN — POTASSIUM CHLORIDE 40 MEQ: 1500 TABLET, EXTENDED RELEASE ORAL at 08:55

## 2022-04-08 RX ADMIN — Medication 250 MCG: at 08:55

## 2022-04-08 RX ADMIN — IPRATROPIUM BROMIDE AND ALBUTEROL SULFATE 3 ML: .5; 3 SOLUTION RESPIRATORY (INHALATION) at 07:58

## 2022-04-08 RX ADMIN — CITALOPRAM HYDROBROMIDE 40 MG: 20 TABLET, FILM COATED ORAL at 08:54

## 2022-04-08 RX ADMIN — GABAPENTIN 400 MG: 300 CAPSULE ORAL at 16:30

## 2022-04-08 RX ADMIN — IPRATROPIUM BROMIDE AND ALBUTEROL SULFATE 3 ML: .5; 3 SOLUTION RESPIRATORY (INHALATION) at 10:53

## 2022-04-08 RX ADMIN — GUAIFENESIN 10 ML: 100 SOLUTION ORAL at 09:14

## 2022-04-08 RX ADMIN — ASPIRIN 81 MG CHEWABLE TABLET 81 MG: 81 TABLET CHEWABLE at 20:11

## 2022-04-08 RX ADMIN — SIMVASTATIN 40 MG: 40 TABLET, FILM COATED ORAL at 21:36

## 2022-04-08 RX ADMIN — MULTIPLE VITAMINS W/ MINERALS TAB 1 TABLET: TAB at 08:54

## 2022-04-08 RX ADMIN — POTASSIUM CHLORIDE 20 MEQ: 1500 TABLET, EXTENDED RELEASE ORAL at 20:11

## 2022-04-08 RX ADMIN — B-COMPLEX W/ C & FOLIC ACID TAB 1 TABLET: TAB at 08:54

## 2022-04-08 RX ADMIN — CYANOCOBALAMIN TAB 1000 MCG 1000 MCG: 1000 TAB at 08:54

## 2022-04-08 RX ADMIN — SPIRONOLACTONE 25 MG: 25 TABLET ORAL at 08:54

## 2022-04-08 RX ADMIN — Medication 1 PACKET: at 09:16

## 2022-04-08 RX ADMIN — GABAPENTIN 400 MG: 300 CAPSULE ORAL at 21:36

## 2022-04-08 RX ADMIN — AMPICILLIN SODIUM AND SULBACTAM SODIUM 3 G: 2; 1 INJECTION, POWDER, FOR SOLUTION INTRAMUSCULAR; INTRAVENOUS at 06:24

## 2022-04-08 RX ADMIN — BUPROPION HYDROCHLORIDE 150 MG: 150 TABLET, EXTENDED RELEASE ORAL at 08:54

## 2022-04-08 RX ADMIN — HYDROMORPHONE HYDROCHLORIDE 4 MG: 2 TABLET ORAL at 20:21

## 2022-04-08 RX ADMIN — FUROSEMIDE 20 MG: 10 INJECTION, SOLUTION INTRAVENOUS at 16:30

## 2022-04-08 RX ADMIN — ROPINIROLE HYDROCHLORIDE 1 MG: 0.5 TABLET, FILM COATED ORAL at 21:36

## 2022-04-08 RX ADMIN — GABAPENTIN 400 MG: 300 CAPSULE ORAL at 08:55

## 2022-04-08 RX ADMIN — Medication 1 CAPSULE: at 09:14

## 2022-04-08 RX ADMIN — IPRATROPIUM BROMIDE AND ALBUTEROL SULFATE 3 ML: .5; 3 SOLUTION RESPIRATORY (INHALATION) at 18:57

## 2022-04-08 RX ADMIN — AMPICILLIN SODIUM AND SULBACTAM SODIUM 3 G: 2; 1 INJECTION, POWDER, FOR SOLUTION INTRAMUSCULAR; INTRAVENOUS at 12:56

## 2022-04-08 RX ADMIN — PANTOPRAZOLE SODIUM 40 MG: 40 TABLET, DELAYED RELEASE ORAL at 06:24

## 2022-04-08 RX ADMIN — FUROSEMIDE 20 MG: 10 INJECTION, SOLUTION INTRAVENOUS at 08:54

## 2022-04-08 RX ADMIN — ROPINIROLE HYDROCHLORIDE 0.5 MG: 0.5 TABLET, FILM COATED ORAL at 12:56

## 2022-04-08 ASSESSMENT — ACTIVITIES OF DAILY LIVING (ADL)
ADLS_ACUITY_SCORE: 16
ADLS_ACUITY_SCORE: 16
ADLS_ACUITY_SCORE: 14
ADLS_ACUITY_SCORE: 16
ADLS_ACUITY_SCORE: 14
ADLS_ACUITY_SCORE: 16
ADLS_ACUITY_SCORE: 15
ADLS_ACUITY_SCORE: 14
ADLS_ACUITY_SCORE: 16
ADLS_ACUITY_SCORE: 16
ADLS_ACUITY_SCORE: 14
ADLS_ACUITY_SCORE: 16
ADLS_ACUITY_SCORE: 14
ADLS_ACUITY_SCORE: 16

## 2022-04-08 NOTE — PROGRESS NOTES
[facility]    Infectious Disease Progress Note    Date of Service : 04/08/2022     Assessment:  76YF with morbid obesity who has been hospitalized since 3/21 with venous ulcers, RLE cellulitis and is s/p excisional debridement of RLE ulcer with wound vac. She developed fever and marked leukocytosis of 30K related to aspiration pneumonia/pneumonitis , covid/influenza negative.  CT abdomen was non focal and wound appears stable. She is improving with marked improvement in leukocytosis     -Aspiration pneumonia/pneumonitis  -Chronic LE ulcer s/p debridement  -Morbidly obese body habitus  -Atrial fibrillation with RVR, demand ischemia        Recommendations  1. Discontinue Unasyn after completing doses today   2. RLE wound appears stable without signs of infection  3. Wound care and lymphedema management  Supportive care for multiple other ongoing issues    ID will sign off      Lisette Chow MD    Interval History   Continuing to improved, on the floor now. Seeing during dressing change, wound appears stable. Breathing also improving though continues to require oxygen    Physical Exam   Temp: 98  F (36.7  C) Temp src: Oral BP: 133/63 Pulse: 67   Resp: 20 SpO2: 97 % O2 Device: Nasal cannula Oxygen Delivery: 2 LPM  Vitals:    04/05/22 0500 04/06/22 0500 04/07/22 0850   Weight: 128.3 kg (282 lb 14.4 oz) 129 kg (284 lb 6.3 oz) 126.8 kg (279 lb 9.6 oz)     Vital Signs with Ranges  Temp:  [98  F (36.7  C)-99.3  F (37.4  C)] 98  F (36.7  C)  Pulse:  [67-82] 67  Resp:  [16-20] 20  BP: (124-144)/(61-73) 133/63  SpO2:  [92 %-99 %] 97 %    Constitutional: Awake, alert, cooperative, no apparent distress  Lungs: non labored breathing  Abdomen: obese  Skin: No rash  MS : RLE wound examined, no sign of infection      Other:    Medications     Reason anticoagulant not prescribed for atrial fibrillation       - MEDICATION INSTRUCTIONS -       - MEDICATION INSTRUCTIONS -         ampicillin-sulbactam (UNASYN) IV  3  g Intravenous Q6H     aspirin  81 mg Oral QPM     buPROPion  150 mg Oral QAM     citalopram  40 mg Oral QAM     cyanocobalamin  1,000 mcg Oral Daily     diosmin-hesperidin 450-50  1 capsule Oral BID     ferrous sulfate  325 mg Oral Daily     furosemide  20 mg Intravenous BID     gabapentin  400 mg Oral TID     ipratropium - albuterol 0.5 mg/2.5 mg/3 mL  3 mL Nebulization Q4H While awake     Aleksandr  1 packet Oral BID     multivitamin w/minerals  1 tablet Oral Daily     pantoprazole  40 mg Oral BID AC     polyethylene glycol  17 g Oral Daily     potassium chloride ER  20 mEq Oral QPM     potassium chloride ER  40 mEq Oral QAM     rOPINIRole  0.5 mg Oral BID     rOPINIRole  1 mg Oral At Bedtime     senna-docusate  1 tablet Oral BID     simvastatin  40 mg Oral At Bedtime     sodium chloride (PF)  3 mL Intracatheter Q8H     spironolactone  25 mg Oral Daily     vitamin B complex with vitamin C  1 tablet Oral Daily     Vitamin D3  250 mcg Oral Daily       Data   All microbiology laboratory data reviewed.  Recent Labs   Lab Test 04/07/22  1434 04/07/22  1042 04/06/22  0544   WBC 11.6* 12.2* 11.1*   HGB 8.8* 9.2* 6.7*   HCT 30.0* 30.8* 23.0*   MCV 89 88 90    390 376     Recent Labs   Lab Test 04/08/22  0801 04/07/22  0537 04/06/22  0544   CR 1.03 0.94 0.92     Recent Labs   Lab Test 10/20/21  0835   SED 55*

## 2022-04-08 NOTE — PLAN OF CARE
Goal Outcome Evaluation:    Plan of Care Reviewed With: patient     Overall Patient Progress: improving    DATE & TIME: 4/8/2022 [id number]  Cognitive Concerns/ Orientation: A&Ox4  BEHAVIOR & AGGRESSION TOOL COLOR: Green    ABNL VS/O2: VSS on 1L O2, CPAP at bedtime, attempted to wean O2 down to RA, pt denies SOB, desat to 88% on RA at rest. No signs of respiratory distress. Other VSS.   MOBILITY: Up with 1/walker/gait belt.   PAIN MANAGMENT: pain in RLE wound after wound cares, PRN tylenol x2  DIET: regular with thin liquids, aspiration precautions, no straws.  BOWEL/BLADDER: Incontinent of bladder at times, purewick used intermittently. Up to BR during day.   ABNL LAB: labs today wnl   DRAIN/DEVICES: PIV LUE saline locked.   TELEMETRY RHYTHM: NSR   SKIN: Wound cares on RLE and devon area done per WOC order.   TESTS/PROCEDURES: NA  D/C DAY/GOALS/PLACE: TBD - will need TCU/LTACH, SW following.  OTHER IMPORTANT INFO: Maintained Contact isolation for Hx of MRSA. On IV Unasyn q6h. PT/cardiology/WOC following.

## 2022-04-08 NOTE — PROGRESS NOTES
[facility]    Medicine Progress Note - Hospitalist Service    Date of Admission:  3/21/2022    Assessment & Plan        Elizabeth Kelley is a 76 year old female with PMHx MS, chronic BLE lymphedema with venous insufficiency and chronic R leg cellulitis, GERD, hyperlipidemia, and depression who underwent previously scheduled excisional debridement of RLE cicumferential venous hypertensive ulceration and application of wound vac on 3/21/22.   Hospitalist service consulted 3/23/22 for AMS ultimately determined to have sepsis 2/2 CAP with respiratory failure. She had been progressing well, completing antibiotic regimen and weaned to room air with discharge planning in progress until 4/3 with worsening systemic evidence of infection and hypoxia, resumed on broad-spectrum antibiotics and imaging suggestive of bilateral pulmonary infiltrates. Also with development of afib with RVR requiring IV medications, transition to IMC. Cardiac workup unrevealing, has since improved from cardiopulmonary perspective and transferred to Med/Surg.     Sepsis secondary to CAP vs aspiration pneumonia, recurrent  Acute hypoxic respiratory failure secondary to above, improving  Hx of MRSA  * 3/23 Tmax 101.5, tachycardic in the 110s, hypoxic to 86% on room air. WBC 18.3, procal 0.17. CXR with patchy airspace opacities in the left mid and lower lung, suspicious for pneumonia. Treated with Zosyn for CAP, repeat CXR 3/28 with new right upper lobe infiltrate and basilar infiltrate lateral views. Received diuretics for volume overload, transitioned to Augmentin on 3/30 with plans for 14 day course to treat LE cellulitis.  * 4/3 with fever to 102, uptrending WBC with mildly elevated procalcitonin. C/O SOB, noted to be hypoxic. COVID-19, RSV, Influenza PCR neg. UA neg. CXR on 4/2 neg. LE wound valuated by vascular surgery, no evidence of infection. CTA Chest neg for PE, with bilateral pulmonary infiltrates L>R, most  consistent with infectious etiology. Blood cultures x2 repeated. Resumed IV Zosyn, Vanco. ID consulted.   * 4/5 Video swallow negative, cleared for reg diet with instructions to sit upright, speech continuing to follow  * 4/6 Weaned to 1L O2. I/O appear innacurate as net value -18L, according to daily weights is +15 lbs with reported weight gain of 2lbs overnight.  * Weight 284--279  - Zosyn transitioned to Unasyn on 4/5  - ID consulted, appreciate input, plan for Abx until 4/8  - Follow blood cultures, NGTD  - Sputum culture as able  - RCAT consulted, encourage pulmonary toilet with IS and acapella.  - Duonebs q4 hrs while awake, PRN albuterol nebs.  - Monitor fluid status.  - IV diuresis with lasix 20mg BID, pending weight trend today will continue one additional day v resume PO lasix  - Strict I&O, daily weights  - Resumed PTA Aldactone  - Robitussin, tessalon perles prn.  - SLP following    Afib with RVR, new diagnosis  NSTEMI  Overnight 4/4 with RRT for development of afib with RVR, rates 130s-150s. In setting of sepsis. EKG with marked ST depression and TWI in lateral leads. Received IV diltiazem bolus with plan to transition to drip, developed three 3-second pauses with conversion to SR. Initiated on heparin drip. Remains in NSR this AM.  * Troponin 17--98  * Lexiscan stress negative for inducible ischemia  * TTE with LVEF 60-65%, no WMAs, no valvular dz, mildly elevated PA pressure  - Cardiology consulted, ischemic workup neg, no indication for AC at this time given setting of sepsis. If afib recurs, would rec AC and tx with amiodarone rather than diltiazem     Encephalopathy, multifactorial, resolved  Altered mentation noted by nursing during hosptialization. Multifactorial in the setting of fever, narcotic pain medications, suspected underlying BOLIVAR, possible dehydration. Received dose of narcan, but did not significantly change pts mentation immediately, though seemed to improve within the hour after IVF  bolus was near completion. Mental status continues to improved.  - Continue expectant management.  - Bipap prn.  - Suspect underlying BOLIVAR, urged her to get evaluated as outpatient.  - Resumed PTA Wellbutrin, Requip as per the patient's request but will closely monitor mentation; low threshold to hold these meds if she becomes confused/lethargic.  - Discontinue PTA lyrica, will increase gabapentin by 100mg      Acute blood loss anemia  Chronic normocytic anemia  Baseline 10-11. Down to 8.3 post-procedure. No active bleeding, some bleeding from wound bed with dressing changes. Could be dilutional as checked while receiving IVF bolus   *Received 1u PRBCs 3/25, 3/27, 4/6 for Hgb < 7  *Hgb 9.2  - Additional 1u PRBCs for Hgb < 7  - Discontinued scheduled Celebrex  - Increase PPI proph to BID  - Iron studies suggestive of iron deficiency, started on oral iron supplement.  - CBC in AM     Hematuria, resolved  Noted in AM on 3/30/22. Likely 2/2 trauma/irritation from garrison catheter. Has been on empiric Abx, UA 4/1 neg. Resolved as of 3/31. Garrison removed 4/1.     Chronic BLE lymphedema with venous insufficiency and chronic R leg cellulitis   S/P excisional debridement of RLE cicumferential venous hypertensive ulceration and application of wound vac (3/21/22)  S/P intraoperative wound examination, wound debridement, and application of myriad 2 layer graft on 3/28  - Defer routine post-operative cares including wound cares to Dr. Bullock.  - Judicious pain control.  - Gabapentin 400 mg po TID, PTA Lyrica discontinued 4/5 as above  - Continue PTA aldactone and potassium supplement  - IV diuresis as above     Severe Obesity, BMI 50.81  - Follow up with PCP.      MS  Ambulatory at baseline, though some weakness in lower extremities and intermittent upper extremity weakness.     GERD  - Continue PTA Protonix.     Hyperlipidemia  - Continue PTA simvastatin.     Depression  Resumed PTA Celexa, Wellbutrin and Lyrica on 3/26/22. Had been  held for a few days given AMS, resumed when mentation back to baseline. As above, weaned lyrica off and solely on gabapentin 4/5.     RLS  - Continue PTA Requip.       Diet: Combination Diet Regular Diet; Mildly Thick (level 2) (No straws)    DVT Prophylaxis: Ambulate every shift  Amato Catheter: Not present  Central Lines: None  Cardiac Monitoring: ACTIVE order. Indication: AMI (NSTEMI/ STEMI) (48 hours)  Code Status: Full Code      Disposition Plan   Expected Discharge: 04/10/2022     Anticipated discharge location: home with family    Delays:     Placement - LTC            The patient's care was discussed with the Attending Physician, Dr. Mae, Bedside Nurse, Care Coordinator/, Patient and Patient's Family.    Avel Silverio PA-C  Hospitalist Service  [facility]  Securely message with the Vocera Web Console (learn more here)  Text page via [employer] Paging/Directory         Clinically Significant Risk Factors Present on Admission                    ______________________________________________________________________    Interval History   Pt sitting up in chair, able to transfer to bed with assistance. Feels breathing is improving, continues to require 1-2L. Denies significant pain. Afebrile.    Data reviewed today: I reviewed all medications, new labs and imaging results over the last 24 hours. I personally reviewed no images or EKG's today.    Physical Exam   Vital Signs: Temp: 98  F (36.7  C) Temp src: Oral BP: 133/63 Pulse: 67   Resp: 20 SpO2: 95 % O2 Device: Nasal cannula Oxygen Delivery: 2 LPM  Weight: 279 lbs 9.6 oz     GEN: well-developed, well-nourished, appears comfortable  HEENT: NCAT, EOM intact bilaterally, sclera clear, conjunctiva normal, mucous membranes moist  CHEST: lungs CTA bilaterally, fine crackles in bilateral bases, no wheeze, no increased work of breathing  HEART: RRR, S1 & S2  ABD: soft, nontender, obese, no guarding or rigidity  MSK: AROM bilateral  UE/LE  NEURO: awake, alert, oriented to name, place, and time. CN II-XII grossly intact.   SKIN: warm & dry without rash, R lower leg with chux dressing in place, no surrounding erythema, bilateral lower leg edema persists, mild amount of serosanguinous drainage to R lower leg dressing    Data   Recent Labs   Lab 04/08/22  0801 04/07/22  1434 04/07/22  1042 04/07/22  0537 04/06/22  0544 04/05/22  1215 04/05/22  0804 04/04/22  1356 04/04/22  0523   WBC  --  11.6* 12.2*  --  11.1*   < >  --   --  30.1*   HGB  --  8.8* 9.2*  --  6.7*   < >  --   --  7.3*   MCV  --  89 88  --  90   < >  --   --  87   PLT  --  428 390  --  376   < >  --   --  395     --   --   --  136  --  137  --  136   POTASSIUM 4.5  --   --   --  4.3  --  4.1  4.2   < > 3.2*   CHLORIDE 102  --   --   --  102  --  101  --  100   CO2 30  --   --   --  31  --  29  --  33*   BUN 29  --   --   --  28  --  27  --  32*   CR 1.03  --   --  0.94 0.92  --  0.94   < > 1.03   ANIONGAP 4  --   --   --  3  --  7  --  3   JUNI 9.6  --   --   --  9.2  --  9.1  --  9.4   *  --   --   --  108*  --  115*  --  130*   ALBUMIN  --   --   --   --   --   --   --   --  1.9*   PROTTOTAL  --   --   --   --   --   --   --   --  6.6*   BILITOTAL  --   --   --   --   --   --   --   --  0.4   ALKPHOS  --   --   --   --   --   --   --   --  279*   ALT  --   --   --   --   --   --   --   --  31   AST  --   --   --   --   --   --   --   --  26    < > = values in this interval not displayed.     No results found for this or any previous visit (from the past 24 hour(s)).  Medications     Reason anticoagulant not prescribed for atrial fibrillation       - MEDICATION INSTRUCTIONS -       - MEDICATION INSTRUCTIONS -         ampicillin-sulbactam (UNASYN) IV  3 g Intravenous Q6H     aspirin  81 mg Oral QPM     buPROPion  150 mg Oral QAM     citalopram  40 mg Oral QAM     cyanocobalamin  1,000 mcg Oral Daily     diosmin-hesperidin 450-50  1 capsule Oral BID     ferrous sulfate   325 mg Oral Daily     furosemide  20 mg Intravenous BID     gabapentin  400 mg Oral TID     ipratropium - albuterol 0.5 mg/2.5 mg/3 mL  3 mL Nebulization Q4H While awake     Aleksandr  1 packet Oral BID     multivitamin w/minerals  1 tablet Oral Daily     pantoprazole  40 mg Oral BID AC     polyethylene glycol  17 g Oral Daily     potassium chloride ER  20 mEq Oral QPM     potassium chloride ER  40 mEq Oral QAM     rOPINIRole  0.5 mg Oral BID     rOPINIRole  1 mg Oral At Bedtime     senna-docusate  1 tablet Oral BID     simvastatin  40 mg Oral At Bedtime     sodium chloride (PF)  3 mL Intracatheter Q8H     spironolactone  25 mg Oral Daily     vitamin B complex with vitamin C  1 tablet Oral Daily     Vitamin D3  250 mcg Oral Daily

## 2022-04-08 NOTE — PROGRESS NOTES
A&O x4. Regular diet, thickened liquids. VSS on 1L O2 NC; LS diminished.  IS pushed during the shift, pt states she has been using it independently in her room as well.     Wound care performed on RLE.  Moderate serous drainage.    BM on shift.  Inc bladder, purewick in place; skin red/rash in devon-area; barrier cream applied.  Takes pills whole with applesauce. Up with A1 GBW. Tylenol x1 for RLE pain.  Pt scoring green on the Aggression Stop Light Tool.  Pt transferred to station 66, report given to RN assuming cares.

## 2022-04-08 NOTE — PROGRESS NOTES
Care Management Follow Up    Length of Stay (days): 17    Expected Discharge Date: 04/10/2022     Concerns to be Addressed:       Patient plan of care discussed at interdisciplinary rounds: Yes    Anticipated Discharge Disposition: Transitional Care     Anticipated Discharge Services: None  Anticipated Discharge DME:      Patient/family educated on Medicare website which has current facility and service quality ratings:    Education Provided on the Discharge Plan:    Patient/Family in Agreement with the Plan: yes    Referrals Placed by CM/SW:    Private pay costs discussed:     Additional Information:  Met with alison Leon at her request to update her regarding referrals to LTAC facilities.  Explained Care Coordinator RN has placed a call to follow up with [facility].  Note patient will be off IV antibiotics by tomorrow so this will decrease her acuity of care and may affect her eligibility for LTAC.  Also explained we may not know anymore till Monday.   Marti nderstands if LTAC's decline patient we will resume TCU referrals. Her preference is [facility] and understands the LTAC referrals were made due to multiple TCU's declining due to acuity of patient care needs.      Christina Cabrera, KAYLASW

## 2022-04-08 NOTE — PROGRESS NOTES
DATE & TIME: 4/7/2022 3418-9354  Cognitive Concerns/ Orientation: A&Ox4  BEHAVIOR & AGGRESSION TOOL COLOR: Green    ABNL VS/O2: VSS on 1L O2, CPAP at bedtime, hypertensive at times  MOBILITY: Ax1, GB, walker  PAIN MANAGMENT: pain in RLE wound, PRN tylenol, oral dilaudid effective  DIET: regular w mildly thickened liquids, aspiration precautions, no straws. Pills whole in applesauce  BOWEL/BLADDER: Incontinent of bladder, purewick in place overnight. Up to BR during day  ABNL LAB: WBC 11.6, Hgb 8.8  DRAIN/DEVICES: PIV LUE  TELEMETRY RHYTHM: NSR - pt had new onset afib w RVR this admission, continue to monitor  SKIN: Wound on RLE - drsg change BID - not changed this shift. Red/excoriated devon area - cleanse w kris perineal lotion, don't use blue wet wipes  TESTS/PROCEDURES: NA  D/C DAY/GOALS/PLACE: TBD - will need TCU/LTACH  OTHER IMPORTANT INFO: Pt transferred from CCU at 2200.

## 2022-04-09 ENCOUNTER — APPOINTMENT (OUTPATIENT)
Dept: PHYSICAL THERAPY | Facility: CLINIC | Age: 77
DRG: 264 | End: 2022-04-09
Attending: SURGERY
Payer: COMMERCIAL

## 2022-04-09 LAB
ALBUMIN UR-MCNC: 10 MG/DL
APPEARANCE UR: ABNORMAL
BASOPHILS # BLD MANUAL: 0.1 10E3/UL (ref 0–0.2)
BASOPHILS NFR BLD MANUAL: 1 %
BILIRUB UR QL STRIP: NEGATIVE
COLOR UR AUTO: YELLOW
CREAT SERPL-MCNC: 1.05 MG/DL (ref 0.52–1.04)
EOSINOPHIL # BLD MANUAL: 0.8 10E3/UL (ref 0–0.7)
EOSINOPHIL NFR BLD MANUAL: 8 %
ERYTHROCYTE [DISTWIDTH] IN BLOOD BY AUTOMATED COUNT: 16.5 % (ref 10–15)
GFR SERPL CREATININE-BSD FRML MDRD: 55 ML/MIN/1.73M2
GLUCOSE UR STRIP-MCNC: NEGATIVE MG/DL
HCT VFR BLD AUTO: 28.7 % (ref 35–47)
HGB BLD-MCNC: 8.5 G/DL (ref 11.7–15.7)
HGB UR QL STRIP: NEGATIVE
KETONES UR STRIP-MCNC: NEGATIVE MG/DL
LEUKOCYTE ESTERASE UR QL STRIP: ABNORMAL
LYMPHOCYTES # BLD MANUAL: 1.5 10E3/UL (ref 0.8–5.3)
LYMPHOCYTES NFR BLD MANUAL: 15 %
MCH RBC QN AUTO: 26.2 PG (ref 26.5–33)
MCHC RBC AUTO-ENTMCNC: 29.6 G/DL (ref 31.5–36.5)
MCV RBC AUTO: 89 FL (ref 78–100)
METAMYELOCYTES # BLD MANUAL: 0.1 10E3/UL
METAMYELOCYTES NFR BLD MANUAL: 1 %
MONOCYTES # BLD MANUAL: 1.1 10E3/UL (ref 0–1.3)
MONOCYTES NFR BLD MANUAL: 11 %
MUCOUS THREADS #/AREA URNS LPF: PRESENT /LPF
NEUTROPHILS # BLD MANUAL: 6.5 10E3/UL (ref 1.6–8.3)
NEUTROPHILS NFR BLD MANUAL: 64 %
NITRATE UR QL: NEGATIVE
PH UR STRIP: 7 [PH] (ref 5–7)
PLAT MORPH BLD: ABNORMAL
PLATELET # BLD AUTO: 443 10E3/UL (ref 150–450)
RBC # BLD AUTO: 3.24 10E6/UL (ref 3.8–5.2)
RBC MORPH BLD: ABNORMAL
RBC URINE: 2 /HPF
SP GR UR STRIP: 1.02 (ref 1–1.03)
SQUAMOUS EPITHELIAL: 6 /HPF
TARGETS BLD QL SMEAR: SLIGHT
TRANSITIONAL EPI: 2 /HPF
UROBILINOGEN UR STRIP-MCNC: NORMAL MG/DL
WBC # BLD AUTO: 10.1 10E3/UL (ref 4–11)
WBC URINE: 13 /HPF

## 2022-04-09 PROCEDURE — 94660 CPAP INITIATION&MGMT: CPT

## 2022-04-09 PROCEDURE — 94640 AIRWAY INHALATION TREATMENT: CPT | Mod: 76

## 2022-04-09 PROCEDURE — 250N000013 HC RX MED GY IP 250 OP 250 PS 637: Performed by: SURGERY

## 2022-04-09 PROCEDURE — 250N000009 HC RX 250: Performed by: SURGERY

## 2022-04-09 PROCEDURE — 250N000013 HC RX MED GY IP 250 OP 250 PS 637: Performed by: HOSPITALIST

## 2022-04-09 PROCEDURE — 97530 THERAPEUTIC ACTIVITIES: CPT | Mod: GP

## 2022-04-09 PROCEDURE — 97116 GAIT TRAINING THERAPY: CPT | Mod: GP

## 2022-04-09 PROCEDURE — 36415 COLL VENOUS BLD VENIPUNCTURE: CPT | Performed by: SURGERY

## 2022-04-09 PROCEDURE — 85027 COMPLETE CBC AUTOMATED: CPT | Performed by: PHYSICIAN ASSISTANT

## 2022-04-09 PROCEDURE — 82565 ASSAY OF CREATININE: CPT | Performed by: SURGERY

## 2022-04-09 PROCEDURE — 81001 URINALYSIS AUTO W/SCOPE: CPT | Performed by: HOSPITALIST

## 2022-04-09 PROCEDURE — 999N000157 HC STATISTIC RCP TIME EA 10 MIN

## 2022-04-09 PROCEDURE — 87086 URINE CULTURE/COLONY COUNT: CPT | Performed by: HOSPITALIST

## 2022-04-09 PROCEDURE — 250N000011 HC RX IP 250 OP 636: Performed by: PHYSICIAN ASSISTANT

## 2022-04-09 PROCEDURE — 99232 SBSQ HOSP IP/OBS MODERATE 35: CPT | Performed by: HOSPITALIST

## 2022-04-09 PROCEDURE — 250N000013 HC RX MED GY IP 250 OP 250 PS 637: Performed by: PHYSICIAN ASSISTANT

## 2022-04-09 PROCEDURE — 120N000001 HC R&B MED SURG/OB

## 2022-04-09 RX ORDER — FUROSEMIDE 20 MG/1
10 TABLET ORAL DAILY
Status: DISCONTINUED | OUTPATIENT
Start: 2022-04-09 | End: 2022-04-11

## 2022-04-09 RX ADMIN — B-COMPLEX W/ C & FOLIC ACID TAB 1 TABLET: TAB at 08:13

## 2022-04-09 RX ADMIN — HYDROMORPHONE HYDROCHLORIDE 4 MG: 2 TABLET ORAL at 15:55

## 2022-04-09 RX ADMIN — POTASSIUM CHLORIDE 20 MEQ: 1500 TABLET, EXTENDED RELEASE ORAL at 19:37

## 2022-04-09 RX ADMIN — PANTOPRAZOLE SODIUM 40 MG: 40 TABLET, DELAYED RELEASE ORAL at 15:55

## 2022-04-09 RX ADMIN — Medication 1 PACKET: at 23:31

## 2022-04-09 RX ADMIN — ACETAMINOPHEN 650 MG: 325 TABLET, FILM COATED ORAL at 16:07

## 2022-04-09 RX ADMIN — GABAPENTIN 400 MG: 300 CAPSULE ORAL at 08:12

## 2022-04-09 RX ADMIN — SPIRONOLACTONE 25 MG: 25 TABLET ORAL at 08:12

## 2022-04-09 RX ADMIN — ASPIRIN 81 MG CHEWABLE TABLET 81 MG: 81 TABLET CHEWABLE at 19:37

## 2022-04-09 RX ADMIN — GABAPENTIN 400 MG: 300 CAPSULE ORAL at 15:55

## 2022-04-09 RX ADMIN — FERROUS SULFATE TAB 325 MG (65 MG ELEMENTAL FE) 325 MG: 325 (65 FE) TAB at 08:11

## 2022-04-09 RX ADMIN — PANTOPRAZOLE SODIUM 40 MG: 40 TABLET, DELAYED RELEASE ORAL at 08:12

## 2022-04-09 RX ADMIN — CYANOCOBALAMIN TAB 1000 MCG 1000 MCG: 1000 TAB at 08:13

## 2022-04-09 RX ADMIN — Medication 250 MCG: at 08:11

## 2022-04-09 RX ADMIN — Medication 1 CAPSULE: at 22:06

## 2022-04-09 RX ADMIN — GABAPENTIN 400 MG: 300 CAPSULE ORAL at 22:05

## 2022-04-09 RX ADMIN — ROPINIROLE HYDROCHLORIDE 0.5 MG: 0.5 TABLET, FILM COATED ORAL at 12:18

## 2022-04-09 RX ADMIN — ROPINIROLE HYDROCHLORIDE 1 MG: 0.5 TABLET, FILM COATED ORAL at 22:07

## 2022-04-09 RX ADMIN — IPRATROPIUM BROMIDE AND ALBUTEROL SULFATE 3 ML: .5; 3 SOLUTION RESPIRATORY (INHALATION) at 11:41

## 2022-04-09 RX ADMIN — MULTIPLE VITAMINS W/ MINERALS TAB 1 TABLET: TAB at 08:11

## 2022-04-09 RX ADMIN — SIMVASTATIN 40 MG: 40 TABLET, FILM COATED ORAL at 22:05

## 2022-04-09 RX ADMIN — CITALOPRAM HYDROBROMIDE 40 MG: 20 TABLET, FILM COATED ORAL at 08:11

## 2022-04-09 RX ADMIN — Medication 1 CAPSULE: at 08:10

## 2022-04-09 RX ADMIN — FUROSEMIDE 20 MG: 10 INJECTION, SOLUTION INTRAVENOUS at 08:14

## 2022-04-09 RX ADMIN — SENNOSIDES AND DOCUSATE SODIUM 1 TABLET: 50; 8.6 TABLET ORAL at 22:06

## 2022-04-09 RX ADMIN — IPRATROPIUM BROMIDE AND ALBUTEROL SULFATE 3 ML: .5; 3 SOLUTION RESPIRATORY (INHALATION) at 19:07

## 2022-04-09 RX ADMIN — IPRATROPIUM BROMIDE AND ALBUTEROL SULFATE 3 ML: .5; 3 SOLUTION RESPIRATORY (INHALATION) at 07:57

## 2022-04-09 RX ADMIN — ROPINIROLE HYDROCHLORIDE 0.5 MG: 0.5 TABLET, FILM COATED ORAL at 08:11

## 2022-04-09 RX ADMIN — Medication 1 PACKET: at 08:10

## 2022-04-09 RX ADMIN — SENNOSIDES AND DOCUSATE SODIUM 1 TABLET: 50; 8.6 TABLET ORAL at 08:12

## 2022-04-09 RX ADMIN — BUPROPION HYDROCHLORIDE 150 MG: 150 TABLET, EXTENDED RELEASE ORAL at 08:13

## 2022-04-09 RX ADMIN — FUROSEMIDE 10 MG: 20 TABLET ORAL at 17:44

## 2022-04-09 RX ADMIN — IPRATROPIUM BROMIDE AND ALBUTEROL SULFATE 3 ML: .5; 3 SOLUTION RESPIRATORY (INHALATION) at 22:45

## 2022-04-09 RX ADMIN — POTASSIUM CHLORIDE 40 MEQ: 1500 TABLET, EXTENDED RELEASE ORAL at 08:13

## 2022-04-09 RX ADMIN — IPRATROPIUM BROMIDE AND ALBUTEROL SULFATE 3 ML: .5; 3 SOLUTION RESPIRATORY (INHALATION) at 15:51

## 2022-04-09 RX ADMIN — HYDROMORPHONE HYDROCHLORIDE 4 MG: 2 TABLET ORAL at 10:43

## 2022-04-09 ASSESSMENT — ACTIVITIES OF DAILY LIVING (ADL)
ADLS_ACUITY_SCORE: 14

## 2022-04-09 NOTE — PROGRESS NOTES
[facility]    Medicine Progress Note - Hospitalist Service    Date of Admission:  3/21/2022    Assessment & Plan        Elizabeth Kelley is a 76 year old female with PMHx MS, chronic BLE lymphedema with venous insufficiency and chronic R leg cellulitis, GERD, hyperlipidemia, and depression who underwent previously scheduled excisional debridement of RLE cicumferential venous hypertensive ulceration and application of wound vac on 3/21/22.   Hospitalist service consulted 3/23/22 for AMS ultimately determined to have sepsis 2/2 CAP with respiratory failure. She had been progressing well, completing antibiotic regimen and weaned to room air with discharge planning in progress until 4/3 with worsening systemic evidence of infection and hypoxia, resumed on broad-spectrum antibiotics and imaging suggestive of bilateral pulmonary infiltrates. Also with development of afib with RVR requiring IV medications, transition to IMC. Cardiac workup unrevealing, has since improved from cardiopulmonary perspective and transferred to Med/Surg.     Sepsis secondary to CAP vs aspiration pneumonia, recurrent  Acute hypoxic respiratory failure secondary to above, improving  Hx of MRSA  * 3/23 Tmax 101.5, tachycardic in the 110s, hypoxic to 86% on room air. WBC 18.3, procal 0.17. CXR with patchy airspace opacities in the left mid and lower lung, suspicious for pneumonia. Treated with Zosyn for CAP, repeat CXR 3/28 with new right upper lobe infiltrate and basilar infiltrate lateral views. Received diuretics for volume overload, transitioned to Augmentin on 3/30 with plans for 14 day course to treat LE cellulitis.  * 4/3 with fever to 102, uptrending WBC with mildly elevated procalcitonin. C/O SOB, noted to be hypoxic. COVID-19, RSV, Influenza PCR neg. UA neg. CXR on 4/2 neg. LE wound valuated by vascular surgery, no evidence of infection. CTA Chest neg for PE, with bilateral pulmonary infiltrates L>R, most  consistent with infectious etiology. Blood cultures x2 repeated. Resumed IV Zosyn, Vanco. ID consulted.   * 4/5 Video swallow negative, cleared for reg diet with instructions to sit upright, speech continuing to follow  * 4/6 Weaned to 1L O2. I/O appear innacurate as net value -18L, according to daily weights is +15 lbs with reported weight gain of 2lbs overnight.  * Weight 284--279  - Zosyn transitioned to Unasyn on 4/5  - ID consulted, appreciate input, plan for Abx until 4/8  - Follow blood cultures, NGTD  - Sputum culture as able  - RCAT consulted, encourage pulmonary toilet with IS and acapella.  - Duonebs q4 hrs while awake, PRN albuterol nebs.  - Monitor fluid status.  - IV diuresis with lasix 20mg BID, pending weight trend today will continue one additional day v resume PO lasix  - Strict I&O, daily weights  - Resumed PTA Aldactone  - Robitussin, tessalon perles prn.  - SLP following     Afib with RVR, new diagnosis  NSTEMI  Overnight 4/4 with RRT for development of afib with RVR, rates 130s-150s. In setting of sepsis. EKG with marked ST depression and TWI in lateral leads. Received IV diltiazem bolus with plan to transition to drip, developed three 3-second pauses with conversion to SR. Initiated on heparin drip. Remains in NSR this AM.  * Troponin 17--98  * Lexiscan stress negative for inducible ischemia  * TTE with LVEF 60-65%, no WMAs, no valvular dz, mildly elevated PA pressure  - Cardiology consulted, ischemic workup neg, no indication for AC at this time given setting of sepsis. If afib recurs, would rec AC and tx with amiodarone rather than diltiazem     Encephalopathy, multifactorial, resolved  Altered mentation noted by nursing during hosptialization. Multifactorial in the setting of fever, narcotic pain medications, suspected underlying BOLIVAR, possible dehydration. Received dose of narcan, but did not significantly change pts mentation immediately, though seemed to improve within the hour after IVF  bolus was near completion. Mental status continues to improved.  - Continue expectant management.  - Bipap prn.  - Suspect underlying BOLIVAR, urged her to get evaluated as outpatient.  - Resumed PTA Wellbutrin, Requip as per the patient's request but will closely monitor mentation; low threshold to hold these meds if she becomes confused/lethargic.  - Discontinued PTA lyrica, increased gabapentin by 100mg      Acute blood loss anemia  Chronic normocytic anemia  Baseline 10-11. Down to 8.3 post-procedure. No active bleeding, some bleeding from wound bed with dressing changes. Could be dilutional as checked while receiving IVF bolus   *Received 1u PRBCs 3/25, 3/27, 4/6 for Hgb < 7  *Hgb 9.2  - Additional 1u PRBCs for Hgb < 7  - Discontinued scheduled Celebrex  - Increase PPI proph to BID  - Iron studies suggestive of iron deficiency, started on oral iron supplement.     Hematuria, resolved  Dysuria   Noted in AM on 3/30/22. Likely 2/2 trauma/irritation from garrison catheter. Has been on empiric Abx, UA 4/1 neg. Resolved as of 3/31. Garrison removed 4/1.  -check UA      Chronic BLE lymphedema with venous insufficiency and chronic R leg cellulitis   S/P excisional debridement of RLE cicumferential venous hypertensive ulceration and application of wound vac (3/21/22)  S/P intraoperative wound examination, wound debridement, and application of myriad 2 layer graft on 3/28  - Defer routine post-operative cares including wound cares to Dr. Bullock.  - Judicious pain control.  - Gabapentin 400 mg po TID, PTA Lyrica discontinued 4/5 as above  - Continue PTA aldactone and potassium supplement  - IV diuresis as above     Severe Obesity, BMI 50.81  - Follow up with PCP.      MS  Ambulatory at baseline, though some weakness in lower extremities and intermittent upper extremity weakness.     GERD  - Continue PTA Protonix.     Hyperlipidemia  - Continue PTA simvastatin.     Depression  Resumed PTA Celexa, Wellbutrin and Lyrica on 3/26/22.  Had been held for a few days given AMS, resumed when mentation back to baseline. As above, weaned lyrica off and solely on gabapentin 4/5.     RLS  - Continue PTA Requip.     Diet: Combination Diet Regular Diet; Thin Liquids (level 0) (No straws)    DVT Prophylaxis: Pneumatic Compression Devices  Amato Catheter: Not present  Central Lines: None  Cardiac Monitoring: ACTIVE order. Indication: AMI (NSTEMI/ STEMI) (48 hours)  Code Status: Full Code      Disposition Plan   Expected Discharge: 04/12/2022     Anticipated discharge location: home with family    Delays:     Placement - LTC            The patient's care was discussed with the Patient.    Timbo Diop MD  Hospitalist Service  [facility]  Securely message with the Vocera Web Console (learn more here)  Text page via [employer] Paging/Directory         Clinically Significant Risk Factors Present on Admission                    ______________________________________________________________________    Interval History   Complained of dysuria to her nurse. No complaints for me this morning.    Data reviewed today: I reviewed all medications, new labs and imaging results over the last 24 hours. I personally reviewed no images or EKG's today.    Physical Exam   Vital Signs: Temp: 98.1  F (36.7  C) Temp src: Oral BP: 127/61 Pulse: 75   Resp: 18 SpO2: 95 % O2 Device: Nasal cannula Oxygen Delivery: 2 LPM  Weight: 279 lbs 9.6 oz  Constitutional: awake, alert, cooperative, no apparent distress,  Data   Recent Labs   Lab 04/09/22  0742 04/08/22  0801 04/07/22  1434 04/07/22  1042 04/07/22  0537 04/06/22  0544 04/05/22  1215 04/05/22  0804 04/04/22  1356 04/04/22  0523   WBC 10.1  --  11.6* 12.2*  --  11.1*   < >  --   --  30.1*   HGB 8.5*  --  8.8* 9.2*  --  6.7*   < >  --   --  7.3*   MCV 89  --  89 88  --  90   < >  --   --  87     --  428 390  --  376   < >  --   --  395   NA  --  136  --   --   --  136  --  137  --  136   POTASSIUM   --  4.5  --   --   --  4.3  --  4.1  4.2   < > 3.2*   CHLORIDE  --  102  --   --   --  102  --  101  --  100   CO2  --  30  --   --   --  31  --  29  --  33*   BUN  --  29  --   --   --  28  --  27  --  32*   CR 1.05* 1.03  --   --  0.94 0.92  --  0.94   < > 1.03   ANIONGAP  --  4  --   --   --  3  --  7  --  3   JUNI  --  9.6  --   --   --  9.2  --  9.1  --  9.4   GLC  --  100*  --   --   --  108*  --  115*  --  130*   ALBUMIN  --   --   --   --   --   --   --   --   --  1.9*   PROTTOTAL  --   --   --   --   --   --   --   --   --  6.6*   BILITOTAL  --   --   --   --   --   --   --   --   --  0.4   ALKPHOS  --   --   --   --   --   --   --   --   --  279*   ALT  --   --   --   --   --   --   --   --   --  31   AST  --   --   --   --   --   --   --   --   --  26    < > = values in this interval not displayed.     No results found for this or any previous visit (from the past 24 hour(s)).  Medications     Reason anticoagulant not prescribed for atrial fibrillation       - MEDICATION INSTRUCTIONS -       - MEDICATION INSTRUCTIONS -         aspirin  81 mg Oral QPM     buPROPion  150 mg Oral QAM     citalopram  40 mg Oral QAM     cyanocobalamin  1,000 mcg Oral Daily     diosmin-hesperidin 450-50  1 capsule Oral BID     ferrous sulfate  325 mg Oral Daily     furosemide  20 mg Intravenous BID     gabapentin  400 mg Oral TID     ipratropium - albuterol 0.5 mg/2.5 mg/3 mL  3 mL Nebulization Q4H While awake     Aleksandr  1 packet Oral BID     multivitamin w/minerals  1 tablet Oral Daily     pantoprazole  40 mg Oral BID AC     polyethylene glycol  17 g Oral Daily     potassium chloride ER  20 mEq Oral QPM     potassium chloride ER  40 mEq Oral QAM     rOPINIRole  0.5 mg Oral BID     rOPINIRole  1 mg Oral At Bedtime     senna-docusate  1 tablet Oral BID     simvastatin  40 mg Oral At Bedtime     sodium chloride (PF)  3 mL Intracatheter Q8H     spironolactone  25 mg Oral Daily     vitamin B complex with vitamin C  1 tablet Oral  Daily     Vitamin D3  250 mcg Oral Daily

## 2022-04-09 NOTE — PLAN OF CARE
"Goal Outcome Evaluation:       DATE & TIME: 4/8/2022 7554-4420  Cognitive Concerns/ Orientation: A&Ox4  BEHAVIOR & AGGRESSION TOOL COLOR: Green    ABNL VS/O2: VSS on 1L O2, CPAP at bedtime, VSS.   MOBILITY: Up with 1/walker/gait belt.   PAIN MANAGMENT: pain in RLE wound, PRN tylenol and dilaudid available  DIET: regular with thin liquids, aspiration precautions, no straws.  BOWEL/BLADDER: Incontinent of bladder at times, purewick used intermittently. Up to BR during day.   ABNL LAB: labs today wnl   DRAIN/DEVICES: PIV LUE saline locked.   TELEMETRY RHYTHM: NSR   SKIN: Wound cares on RLE and devon area done per WOC order.   TESTS/PROCEDURES: NA  D/C DAY/GOALS/PLACE: TBD - will need TCU/LTACH, SW following.  OTHER IMPORTANT INFO: Maintained Contact isolation for Hx of MRSA. IV Unasyn completed 4/08/2021. PT/cardiology/WOC following. Dressing reinforced earlier during shift but Pt refuses dressing change this AM \"states wants to do it when she is more awake\"                "

## 2022-04-09 NOTE — PLAN OF CARE
Goal Outcome Evaluation:    Plan of Care Reviewed With: patient     DATE & TIME: 4/9/2022 7341-8603  Cognitive Concerns/ Orientation: A&Ox4  BEHAVIOR & AGGRESSION TOOL COLOR: Green    ABNL VS/O2: 2L O2 sating 93%, attempted to wean O2 down to 1L, desat to 87%, no signs of respiratory distress. Other VSS.   MOBILITY: Up with 1/walker/gait belt.   PAIN MANAGMENT: wound cares are very painful, premedicated with Dilaudid and tylenol per pt request, tolerated better.   DIET: regular with thin liquids, aspiration precautions, no straws.  BOWEL/BLADDER: Incontinent of bladder at times, purewick used intermittently. Up to BR during day.   ABNL LAB: Cr 1.05, Hgb 8.5,   DRAIN/DEVICES: PIV LUE saline locked.   TELEMETRY RHYTHM: NSR   SKIN: Wound cares on RLE and devon area done per WOC order.   TESTS/PROCEDURES: NA  D/C DAY/GOALS/PLACE: TBD, pending  TCU vs LTACH placement, SW following.  OTHER IMPORTANT INFO: Maintained Contact isolation for Hx of MRSA. C/o discomfort/pain before voiding, collected urine sample for UA/UC per MD order.  PT/WOC following.

## 2022-04-10 ENCOUNTER — APPOINTMENT (OUTPATIENT)
Dept: PHYSICAL THERAPY | Facility: CLINIC | Age: 77
DRG: 264 | End: 2022-04-10
Attending: SURGERY
Payer: COMMERCIAL

## 2022-04-10 ENCOUNTER — APPOINTMENT (OUTPATIENT)
Dept: SPEECH THERAPY | Facility: CLINIC | Age: 77
DRG: 264 | End: 2022-04-10
Payer: COMMERCIAL

## 2022-04-10 LAB
CREAT SERPL-MCNC: 1.02 MG/DL (ref 0.52–1.04)
GFR SERPL CREATININE-BSD FRML MDRD: 57 ML/MIN/1.73M2

## 2022-04-10 PROCEDURE — 250N000013 HC RX MED GY IP 250 OP 250 PS 637: Performed by: SURGERY

## 2022-04-10 PROCEDURE — 97530 THERAPEUTIC ACTIVITIES: CPT | Mod: GP

## 2022-04-10 PROCEDURE — 999N000157 HC STATISTIC RCP TIME EA 10 MIN

## 2022-04-10 PROCEDURE — 97116 GAIT TRAINING THERAPY: CPT | Mod: GP

## 2022-04-10 PROCEDURE — 94660 CPAP INITIATION&MGMT: CPT

## 2022-04-10 PROCEDURE — 120N000001 HC R&B MED SURG/OB

## 2022-04-10 PROCEDURE — 250N000013 HC RX MED GY IP 250 OP 250 PS 637: Performed by: PHYSICIAN ASSISTANT

## 2022-04-10 PROCEDURE — 92526 ORAL FUNCTION THERAPY: CPT | Mod: GN

## 2022-04-10 PROCEDURE — 94640 AIRWAY INHALATION TREATMENT: CPT | Mod: 76

## 2022-04-10 PROCEDURE — 250N000009 HC RX 250: Performed by: SURGERY

## 2022-04-10 PROCEDURE — 36415 COLL VENOUS BLD VENIPUNCTURE: CPT | Performed by: SURGERY

## 2022-04-10 PROCEDURE — 82565 ASSAY OF CREATININE: CPT | Performed by: SURGERY

## 2022-04-10 PROCEDURE — 94640 AIRWAY INHALATION TREATMENT: CPT

## 2022-04-10 PROCEDURE — 250N000013 HC RX MED GY IP 250 OP 250 PS 637: Performed by: HOSPITALIST

## 2022-04-10 PROCEDURE — 99232 SBSQ HOSP IP/OBS MODERATE 35: CPT | Performed by: HOSPITALIST

## 2022-04-10 RX ADMIN — ROPINIROLE HYDROCHLORIDE 0.5 MG: 0.5 TABLET, FILM COATED ORAL at 09:13

## 2022-04-10 RX ADMIN — CYANOCOBALAMIN TAB 1000 MCG 1000 MCG: 1000 TAB at 09:15

## 2022-04-10 RX ADMIN — Medication 1 PACKET: at 09:22

## 2022-04-10 RX ADMIN — SIMVASTATIN 40 MG: 40 TABLET, FILM COATED ORAL at 22:29

## 2022-04-10 RX ADMIN — BUPROPION HYDROCHLORIDE 150 MG: 150 TABLET, EXTENDED RELEASE ORAL at 09:13

## 2022-04-10 RX ADMIN — CITALOPRAM HYDROBROMIDE 40 MG: 20 TABLET, FILM COATED ORAL at 09:16

## 2022-04-10 RX ADMIN — Medication 1 CAPSULE: at 09:16

## 2022-04-10 RX ADMIN — HYDROMORPHONE HYDROCHLORIDE 4 MG: 2 TABLET ORAL at 09:14

## 2022-04-10 RX ADMIN — Medication 1 PACKET: at 22:30

## 2022-04-10 RX ADMIN — GABAPENTIN 400 MG: 300 CAPSULE ORAL at 09:12

## 2022-04-10 RX ADMIN — FUROSEMIDE 10 MG: 20 TABLET ORAL at 09:13

## 2022-04-10 RX ADMIN — ASPIRIN 81 MG CHEWABLE TABLET 81 MG: 81 TABLET CHEWABLE at 20:33

## 2022-04-10 RX ADMIN — MULTIPLE VITAMINS W/ MINERALS TAB 1 TABLET: TAB at 09:15

## 2022-04-10 RX ADMIN — ACETAMINOPHEN 650 MG: 325 TABLET, FILM COATED ORAL at 16:32

## 2022-04-10 RX ADMIN — ROPINIROLE HYDROCHLORIDE 0.5 MG: 0.5 TABLET, FILM COATED ORAL at 12:28

## 2022-04-10 RX ADMIN — Medication 250 MCG: at 09:13

## 2022-04-10 RX ADMIN — POLYETHYLENE GLYCOL 3350 17 G: 17 POWDER, FOR SOLUTION ORAL at 09:12

## 2022-04-10 RX ADMIN — PANTOPRAZOLE SODIUM 40 MG: 40 TABLET, DELAYED RELEASE ORAL at 16:33

## 2022-04-10 RX ADMIN — B-COMPLEX W/ C & FOLIC ACID TAB 1 TABLET: TAB at 09:12

## 2022-04-10 RX ADMIN — ROPINIROLE HYDROCHLORIDE 1 MG: 0.5 TABLET, FILM COATED ORAL at 22:28

## 2022-04-10 RX ADMIN — IPRATROPIUM BROMIDE AND ALBUTEROL SULFATE 3 ML: .5; 3 SOLUTION RESPIRATORY (INHALATION) at 08:04

## 2022-04-10 RX ADMIN — HYDROMORPHONE HYDROCHLORIDE 4 MG: 2 TABLET ORAL at 16:32

## 2022-04-10 RX ADMIN — IPRATROPIUM BROMIDE AND ALBUTEROL SULFATE 3 ML: .5; 3 SOLUTION RESPIRATORY (INHALATION) at 12:01

## 2022-04-10 RX ADMIN — POTASSIUM CHLORIDE 20 MEQ: 1500 TABLET, EXTENDED RELEASE ORAL at 20:33

## 2022-04-10 RX ADMIN — ACETAMINOPHEN 650 MG: 325 TABLET, FILM COATED ORAL at 09:15

## 2022-04-10 RX ADMIN — GABAPENTIN 400 MG: 300 CAPSULE ORAL at 22:29

## 2022-04-10 RX ADMIN — FERROUS SULFATE TAB 325 MG (65 MG ELEMENTAL FE) 325 MG: 325 (65 FE) TAB at 09:12

## 2022-04-10 RX ADMIN — SENNOSIDES AND DOCUSATE SODIUM 1 TABLET: 50; 8.6 TABLET ORAL at 20:33

## 2022-04-10 RX ADMIN — POTASSIUM CHLORIDE 40 MEQ: 1500 TABLET, EXTENDED RELEASE ORAL at 09:13

## 2022-04-10 RX ADMIN — GABAPENTIN 400 MG: 300 CAPSULE ORAL at 16:33

## 2022-04-10 RX ADMIN — SPIRONOLACTONE 25 MG: 25 TABLET ORAL at 09:13

## 2022-04-10 RX ADMIN — PANTOPRAZOLE SODIUM 40 MG: 40 TABLET, DELAYED RELEASE ORAL at 09:13

## 2022-04-10 RX ADMIN — SENNOSIDES AND DOCUSATE SODIUM 1 TABLET: 50; 8.6 TABLET ORAL at 09:16

## 2022-04-10 ASSESSMENT — ACTIVITIES OF DAILY LIVING (ADL)
ADLS_ACUITY_SCORE: 14
ADLS_ACUITY_SCORE: 18
ADLS_ACUITY_SCORE: 14
ADLS_ACUITY_SCORE: 18
ADLS_ACUITY_SCORE: 14

## 2022-04-10 NOTE — PLAN OF CARE
Goal Outcome Evaluation:      DATE & TIME: 4/9/2022 2730-6277  Cognitive Concerns/ Orientation: A&Ox4  BEHAVIOR & AGGRESSION TOOL COLOR: Green    ABNL VS/O2: 2L O2, BIPAP to sleep,no signs of respiratory distress. Other VSS.   MOBILITY: Up with 1/walker/gait belt.   PAIN MANAGMENT: PRN Dilaudid and tylenol available    DIET: regular with thin liquids, aspiration precautions, no straws.  BOWEL/BLADDER: Incontinent of bladder at times, purewick used intermittently. Up to BR during day.   ABNL LAB: Cr 1.05, Hgb 8.5  DRAIN/DEVICES: PIV LUE saline locked.   TELEMETRY RHYTHM: NSR   SKIN: Wound cares on RLE and devon area done per WOC order.   TESTS/PROCEDURES: NA  D/C DAY/GOALS/PLACE: TBD, pending  TCU vs LTACH placement, SW following.  OTHER IMPORTANT INFO: Maintained Contact isolation for Hx of MRSA. No c/o of pain on this shift.  PT/WOC following. Will continue to monitor.

## 2022-04-10 NOTE — PLAN OF CARE
Goal Outcome Evaluation:    Plan of Care Reviewed With: patient     Overall Patient Progress: improving    DATE & TIME: 4/10/2022 5208-4454  Cognitive Concerns/ Orientation: A&Ox4  BEHAVIOR & AGGRESSION TOOL COLOR: Green    ABNL VS/O2: 2L O2 sating 93%, attempted to wean O2 down to 1L, desat to 87%, no signs of respiratory distress. Other VSS.   MOBILITY: Up with 1/walker/gait belt.   PAIN MANAGMENT: wound cares are very painful, premedicated with Dilaudid and tylenol per pt request, tolerated better.   DIET: regular with thin liquids, aspiration precautions, no straws.  BOWEL/BLADDER: Incontinent of bladder at times, purewick used intermittently. Up to BR during day.   ABNL LAB: no labs drawn today   DRAIN/DEVICES: PIV LUE saline locked.   TELEMETRY RHYTHM: NSR   SKIN: Wound cares on RLE and devon area done per WOC order.   TESTS/PROCEDURES: NA  D/C DAY/GOALS/PLACE: TBD, pending  TCU vs LTACH placement, SW following.  OTHER IMPORTANT INFO: Maintained Contact isolation for Hx of MRSA. PT/WOC following.

## 2022-04-10 NOTE — PROGRESS NOTES
[facility]    Medicine Progress Note - Hospitalist Service    Date of Admission:  3/21/2022    Assessment & Plan        Elizabeth Kelley is a 76 year old female with PMHx MS, chronic BLE lymphedema with venous insufficiency and chronic R leg cellulitis, GERD, hyperlipidemia, and depression who underwent previously scheduled excisional debridement of RLE cicumferential venous hypertensive ulceration and application of wound vac on 3/21/22.   Hospitalist service consulted 3/23/22 for AMS ultimately determined to have sepsis 2/2 CAP with respiratory failure. She had been progressing well, completing antibiotic regimen and weaned to room air with discharge planning in progress until 4/3 with worsening systemic evidence of infection and hypoxia, resumed on broad-spectrum antibiotics and imaging suggestive of bilateral pulmonary infiltrates. Also with development of afib with RVR requiring IV medications, transition to IMC. Cardiac workup unrevealing, has since improved from cardiopulmonary perspective and transferred to Med/Surg.     Sepsis secondary to CAP vs aspiration pneumonia, recurrent  Acute hypoxic respiratory failure secondary to above, improving  Hx of MRSA  * 3/23 Tmax 101.5, tachycardic in the 110s, hypoxic to 86% on room air. WBC 18.3, procal 0.17. CXR with patchy airspace opacities in the left mid and lower lung, suspicious for pneumonia. Treated with Zosyn for CAP, repeat CXR 3/28 with new right upper lobe infiltrate and basilar infiltrate lateral views. Received diuretics for volume overload, transitioned to Augmentin on 3/30 with plans for 14 day course to treat LE cellulitis.  * 4/3 with fever to 102, uptrending WBC with mildly elevated procalcitonin. C/O SOB, noted to be hypoxic. COVID-19, RSV, Influenza PCR neg. UA neg. CXR on 4/2 neg. LE wound valuated by vascular surgery, no evidence of infection. CTA Chest neg for PE, with bilateral pulmonary infiltrates L>R, most  consistent with infectious etiology. Blood cultures x2 repeated. Resumed IV Zosyn, Vanco. ID consulted.   * 4/5 Video swallow negative, cleared for reg diet with instructions to sit upright, speech continuing to follow  * 4/6 Weaned to 1L O2. I/O appear innacurate as net value -18L, according to daily weights is +15 lbs with reported weight gain of 2lbs overnight.  * Weight 284--279  - Zosyn transitioned to Unasyn on 4/5  - ID consulted, appreciate input, plan for Abx until 4/8  - Follow blood cultures, NGTD  - Sputum culture as able  - RCAT consulted, encourage pulmonary toilet with IS and acapella.  - Duonebs q4 hrs while awake, PRN albuterol nebs.  - Monitor fluid status.  - IV diuresis with lasix 20mg BID, pending weight trend today will continue one additional day v resume PO lasix  - Strict I&O, daily weights  - Resumed PTA Aldactone  - Robitussin, tessalon perles prn.  - SLP following     Afib with RVR, new diagnosis  NSTEMI  Overnight 4/4 with RRT for development of afib with RVR, rates 130s-150s. In setting of sepsis. EKG with marked ST depression and TWI in lateral leads. Received IV diltiazem bolus with plan to transition to drip, developed three 3-second pauses with conversion to SR. Initiated on heparin drip. Remains in NSR this AM.  * Troponin 17--98  * Lexiscan stress negative for inducible ischemia  * TTE with LVEF 60-65%, no WMAs, no valvular dz, mildly elevated PA pressure  - Cardiology consulted, ischemic workup neg, no indication for AC at this time given setting of sepsis. If afib recurs, would rec AC and tx with amiodarone rather than diltiazem     Encephalopathy, multifactorial, resolved  Altered mentation noted by nursing during hosptialization. Multifactorial in the setting of fever, narcotic pain medications, suspected underlying BOLIVAR, possible dehydration. Received dose of narcan, but did not significantly change pts mentation immediately, though seemed to improve within the hour after IVF  bolus was near completion. Mental status continues to improved.  - Continue expectant management.  - Bipap prn.  - Suspect underlying BOLIVAR, urged her to get evaluated as outpatient.  - Resumed PTA Wellbutrin, Requip as per the patient's request but will closely monitor mentation; low threshold to hold these meds if she becomes confused/lethargic.  - Discontinued PTA lyrica, increased gabapentin by 100mg      Acute blood loss anemia  Chronic normocytic anemia  Baseline 10-11. Down to 8.3 post-procedure. No active bleeding, some bleeding from wound bed with dressing changes. Could be dilutional as checked while receiving IVF bolus   *Received 1u PRBCs 3/25, 3/27, 4/6 for Hgb < 7  *Hgb 9.2  - Additional 1u PRBCs for Hgb < 7  - Discontinued scheduled Celebrex  - Increase PPI proph to BID  - Iron studies suggestive of iron deficiency, started on oral iron supplement.     Hematuria, resolved  Dysuria - resolved  Noted in AM on 3/30/22. Likely 2/2 trauma/irritation from garrison catheter. Has been on empiric Abx, UA 4/1 neg. Resolved as of 3/31. Garrison removed 4/1.  Urinalysis unremarkable on 4/9.     Chronic BLE lymphedema with venous insufficiency and chronic R leg cellulitis   S/P excisional debridement of RLE cicumferential venous hypertensive ulceration and application of wound vac (3/21/22)  S/P intraoperative wound examination, wound debridement, and application of myriad 2 layer graft on 3/28  - Defer routine post-operative cares including wound cares to Dr. Bullock.  - Judicious pain control.  - Gabapentin 400 mg po TID, PTA Lyrica discontinued 4/5 as above  - Continue PTA aldactone and potassium supplement  - IV diuresis as above     Severe Obesity, BMI 50.81  - Follow up with PCP.      MS  Ambulatory at baseline, though some weakness in lower extremities and intermittent upper extremity weakness.     GERD  - Continue PTA Protonix.     Hyperlipidemia  - Continue PTA simvastatin.     Depression  Resumed PTA Celexa,  Wellbutrin and Lyrica on 3/26/22. Had been held for a few days given AMS, resumed when mentation back to baseline. As above, weaned lyrica off and solely on gabapentin 4/5.     RLS  - Continue PTA Requip.    4/10- stable and awaiting placement, continue present care.     Diet: Combination Diet Regular Diet; Thin Liquids (level 0) (No straws)    DVT Prophylaxis: Pneumatic Compression Devices  Amato Catheter: Not present  Central Lines: None  Cardiac Monitoring: ACTIVE order. Indication: AMI (NSTEMI/ STEMI) (48 hours)  Code Status: Full Code      Disposition Plan   Expected Discharge: 04/12/2022     Anticipated discharge location: home with family    Delays:     Placement - LTC            The patient's care was discussed with the Patient.    Timbo Diop MD  Hospitalist Service  [facility]  Securely message with the Vocera Web Console (learn more here)  Text page via [email or web address] Paging/Directory         Clinically Significant Risk Factors Present on Admission                    ______________________________________________________________________    Interval History   No further dysuria and no new complaints     Data reviewed today: I reviewed all medications, new labs and imaging results over the last 24 hours. I personally reviewed no images or EKG's today.    Physical Exam   Vital Signs: Temp: 98.1  F (36.7  C) Temp src: Oral BP: 118/49 Pulse: 75   Resp: 16 SpO2: 95 % O2 Device: Nasal cannula Oxygen Delivery: 2 LPM  Weight: 279 lbs 9.6 oz  Constitutional: awake, alert, cooperative, no apparent distress,  Data   Recent Labs   Lab 04/10/22  0740 04/09/22  0742 04/08/22  0801 04/07/22  1434 04/07/22  1042 04/07/22  0537 04/06/22  0544 04/05/22  1215 04/05/22  0804 04/04/22  1356 04/04/22  0523   WBC  --  10.1  --  11.6* 12.2*  --  11.1*   < >  --   --  30.1*   HGB  --  8.5*  --  8.8* 9.2*  --  6.7*   < >  --   --  7.3*   MCV  --  89  --  89 88  --  90   < >  --   --  87   PLT  --  443   --  428 390  --  376   < >  --   --  395   NA  --   --  136  --   --   --  136  --  137  --  136   POTASSIUM  --   --  4.5  --   --   --  4.3  --  4.1  4.2   < > 3.2*   CHLORIDE  --   --  102  --   --   --  102  --  101  --  100   CO2  --   --  30  --   --   --  31  --  29  --  33*   BUN  --   --  29  --   --   --  28  --  27  --  32*   CR 1.02 1.05* 1.03  --   --    < > 0.92  --  0.94   < > 1.03   ANIONGAP  --   --  4  --   --   --  3  --  7  --  3   JUNI  --   --  9.6  --   --   --  9.2  --  9.1  --  9.4   GLC  --   --  100*  --   --   --  108*  --  115*  --  130*   ALBUMIN  --   --   --   --   --   --   --   --   --   --  1.9*   PROTTOTAL  --   --   --   --   --   --   --   --   --   --  6.6*   BILITOTAL  --   --   --   --   --   --   --   --   --   --  0.4   ALKPHOS  --   --   --   --   --   --   --   --   --   --  279*   ALT  --   --   --   --   --   --   --   --   --   --  31   AST  --   --   --   --   --   --   --   --   --   --  26    < > = values in this interval not displayed.     No results found for this or any previous visit (from the past 24 hour(s)).  Medications     Reason anticoagulant not prescribed for atrial fibrillation       - MEDICATION INSTRUCTIONS -       - MEDICATION INSTRUCTIONS -         aspirin  81 mg Oral QPM     buPROPion  150 mg Oral QAM     citalopram  40 mg Oral QAM     cyanocobalamin  1,000 mcg Oral Daily     diosmin-hesperidin 450-50  1 capsule Oral BID     ferrous sulfate  325 mg Oral Daily     furosemide  10 mg Oral Daily     gabapentin  400 mg Oral TID     ipratropium - albuterol 0.5 mg/2.5 mg/3 mL  3 mL Nebulization Q4H While awake     Aleksandr  1 packet Oral BID     multivitamin w/minerals  1 tablet Oral Daily     pantoprazole  40 mg Oral BID AC     polyethylene glycol  17 g Oral Daily     potassium chloride ER  20 mEq Oral QPM     potassium chloride ER  40 mEq Oral QAM     rOPINIRole  0.5 mg Oral BID     rOPINIRole  1 mg Oral At Bedtime     senna-docusate  1 tablet Oral BID      simvastatin  40 mg Oral At Bedtime     sodium chloride (PF)  3 mL Intracatheter Q8H     spironolactone  25 mg Oral Daily     vitamin B complex with vitamin C  1 tablet Oral Daily     Vitamin D3  250 mcg Oral Daily

## 2022-04-10 NOTE — PLAN OF CARE
Speech Language Therapy Discharge Summary    Reason for therapy discharge:    highest practical level achieved in IP setting    Progress towards therapy goal(s). See goals on Care Plan in [facility] electronic health record for goal details.  Goals met    Therapy recommendation(s):    Continued therapy is recommended.  Rationale/Recommendations:  Rec: ST to follow at TCU/next place of care to ensure toleration of regular solids and thin liquids diet, w/ consistent use of safe swallowings strategies for aspiration precautions .    Goal Outcome Evaluation:

## 2022-04-11 ENCOUNTER — APPOINTMENT (OUTPATIENT)
Dept: PHYSICAL THERAPY | Facility: CLINIC | Age: 77
DRG: 264 | End: 2022-04-11
Attending: SURGERY
Payer: COMMERCIAL

## 2022-04-11 ENCOUNTER — TELEPHONE (OUTPATIENT)
Dept: WOUND CARE | Facility: CLINIC | Age: 77
End: 2022-04-11
Payer: COMMERCIAL

## 2022-04-11 LAB
BACTERIA UR CULT: NORMAL
CREAT SERPL-MCNC: 1.01 MG/DL (ref 0.52–1.04)
GFR SERPL CREATININE-BSD FRML MDRD: 58 ML/MIN/1.73M2
GLUCOSE BLDC GLUCOMTR-MCNC: 100 MG/DL (ref 70–99)
GLUCOSE BLDC GLUCOMTR-MCNC: 116 MG/DL (ref 70–99)
GLUCOSE BLDC GLUCOMTR-MCNC: 89 MG/DL (ref 70–99)
GLUCOSE BLDC GLUCOMTR-MCNC: 90 MG/DL (ref 70–99)
HGB BLD-MCNC: 8.9 G/DL (ref 11.7–15.7)
SARS-COV-2 RNA RESP QL NAA+PROBE: NEGATIVE

## 2022-04-11 PROCEDURE — 94660 CPAP INITIATION&MGMT: CPT

## 2022-04-11 PROCEDURE — 82565 ASSAY OF CREATININE: CPT | Performed by: SURGERY

## 2022-04-11 PROCEDURE — 250N000013 HC RX MED GY IP 250 OP 250 PS 637: Performed by: HOSPITALIST

## 2022-04-11 PROCEDURE — 85018 HEMOGLOBIN: CPT | Performed by: HOSPITALIST

## 2022-04-11 PROCEDURE — 97530 THERAPEUTIC ACTIVITIES: CPT | Mod: GP | Performed by: PHYSICAL THERAPIST

## 2022-04-11 PROCEDURE — 36415 COLL VENOUS BLD VENIPUNCTURE: CPT | Performed by: HOSPITALIST

## 2022-04-11 PROCEDURE — U0005 INFEC AGEN DETEC AMPLI PROBE: HCPCS | Performed by: HOSPITALIST

## 2022-04-11 PROCEDURE — 120N000001 HC R&B MED SURG/OB

## 2022-04-11 PROCEDURE — 250N000013 HC RX MED GY IP 250 OP 250 PS 637: Performed by: SURGERY

## 2022-04-11 PROCEDURE — 97116 GAIT TRAINING THERAPY: CPT | Mod: GP | Performed by: PHYSICAL THERAPIST

## 2022-04-11 PROCEDURE — 999N000157 HC STATISTIC RCP TIME EA 10 MIN

## 2022-04-11 PROCEDURE — 250N000013 HC RX MED GY IP 250 OP 250 PS 637: Performed by: PHYSICIAN ASSISTANT

## 2022-04-11 PROCEDURE — 99232 SBSQ HOSP IP/OBS MODERATE 35: CPT | Performed by: HOSPITALIST

## 2022-04-11 RX ORDER — FUROSEMIDE 20 MG/1
10 TABLET ORAL ONCE
Status: COMPLETED | OUTPATIENT
Start: 2022-04-11 | End: 2022-04-11

## 2022-04-11 RX ORDER — FUROSEMIDE 20 MG/1
10 TABLET ORAL
Status: DISCONTINUED | OUTPATIENT
Start: 2022-04-12 | End: 2022-04-15

## 2022-04-11 RX ADMIN — FUROSEMIDE 10 MG: 20 TABLET ORAL at 16:46

## 2022-04-11 RX ADMIN — FERROUS SULFATE TAB 325 MG (65 MG ELEMENTAL FE) 325 MG: 325 (65 FE) TAB at 08:40

## 2022-04-11 RX ADMIN — POTASSIUM CHLORIDE 20 MEQ: 1500 TABLET, EXTENDED RELEASE ORAL at 20:55

## 2022-04-11 RX ADMIN — SENNOSIDES AND DOCUSATE SODIUM 1 TABLET: 50; 8.6 TABLET ORAL at 08:43

## 2022-04-11 RX ADMIN — ROPINIROLE HYDROCHLORIDE 0.5 MG: 0.5 TABLET, FILM COATED ORAL at 08:42

## 2022-04-11 RX ADMIN — POTASSIUM CHLORIDE 40 MEQ: 1500 TABLET, EXTENDED RELEASE ORAL at 08:43

## 2022-04-11 RX ADMIN — MULTIPLE VITAMINS W/ MINERALS TAB 1 TABLET: TAB at 08:41

## 2022-04-11 RX ADMIN — HYDROMORPHONE HYDROCHLORIDE 4 MG: 2 TABLET ORAL at 10:03

## 2022-04-11 RX ADMIN — GABAPENTIN 400 MG: 300 CAPSULE ORAL at 21:04

## 2022-04-11 RX ADMIN — Medication 1 PACKET: at 21:04

## 2022-04-11 RX ADMIN — PANTOPRAZOLE SODIUM 40 MG: 40 TABLET, DELAYED RELEASE ORAL at 06:36

## 2022-04-11 RX ADMIN — HYDROMORPHONE HYDROCHLORIDE 4 MG: 2 TABLET ORAL at 20:54

## 2022-04-11 RX ADMIN — B-COMPLEX W/ C & FOLIC ACID TAB 1 TABLET: TAB at 08:41

## 2022-04-11 RX ADMIN — PANTOPRAZOLE SODIUM 40 MG: 40 TABLET, DELAYED RELEASE ORAL at 15:46

## 2022-04-11 RX ADMIN — Medication 1 PACKET: at 09:56

## 2022-04-11 RX ADMIN — GABAPENTIN 400 MG: 300 CAPSULE ORAL at 08:39

## 2022-04-11 RX ADMIN — Medication 1 CAPSULE: at 21:04

## 2022-04-11 RX ADMIN — FUROSEMIDE 10 MG: 20 TABLET ORAL at 08:40

## 2022-04-11 RX ADMIN — ROPINIROLE HYDROCHLORIDE 1 MG: 0.5 TABLET, FILM COATED ORAL at 20:53

## 2022-04-11 RX ADMIN — SENNOSIDES AND DOCUSATE SODIUM 1 TABLET: 50; 8.6 TABLET ORAL at 20:55

## 2022-04-11 RX ADMIN — BUPROPION HYDROCHLORIDE 150 MG: 150 TABLET, EXTENDED RELEASE ORAL at 08:39

## 2022-04-11 RX ADMIN — ACETAMINOPHEN 650 MG: 325 TABLET, FILM COATED ORAL at 12:12

## 2022-04-11 RX ADMIN — ACETAMINOPHEN 650 MG: 325 TABLET, FILM COATED ORAL at 21:04

## 2022-04-11 RX ADMIN — Medication 250 MCG: at 08:42

## 2022-04-11 RX ADMIN — Medication 1 CAPSULE: at 08:47

## 2022-04-11 RX ADMIN — CYANOCOBALAMIN TAB 1000 MCG 1000 MCG: 1000 TAB at 08:41

## 2022-04-11 RX ADMIN — SIMVASTATIN 40 MG: 40 TABLET, FILM COATED ORAL at 21:04

## 2022-04-11 RX ADMIN — ASPIRIN 81 MG CHEWABLE TABLET 81 MG: 81 TABLET CHEWABLE at 20:54

## 2022-04-11 RX ADMIN — SPIRONOLACTONE 25 MG: 25 TABLET ORAL at 08:39

## 2022-04-11 RX ADMIN — CITALOPRAM HYDROBROMIDE 40 MG: 20 TABLET, FILM COATED ORAL at 08:40

## 2022-04-11 RX ADMIN — ROPINIROLE HYDROCHLORIDE 0.5 MG: 0.5 TABLET, FILM COATED ORAL at 12:42

## 2022-04-11 RX ADMIN — GABAPENTIN 400 MG: 300 CAPSULE ORAL at 15:46

## 2022-04-11 ASSESSMENT — ACTIVITIES OF DAILY LIVING (ADL)
ADLS_ACUITY_SCORE: 17
ADLS_ACUITY_SCORE: 18
ADLS_ACUITY_SCORE: 16
ADLS_ACUITY_SCORE: 18
ADLS_ACUITY_SCORE: 17
ADLS_ACUITY_SCORE: 18
ADLS_ACUITY_SCORE: 17
ADLS_ACUITY_SCORE: 17
ADLS_ACUITY_SCORE: 18
ADLS_ACUITY_SCORE: 17
ADLS_ACUITY_SCORE: 18
ADLS_ACUITY_SCORE: 17
ADLS_ACUITY_SCORE: 18
ADLS_ACUITY_SCORE: 18
ADLS_ACUITY_SCORE: 17
ADLS_ACUITY_SCORE: 17

## 2022-04-11 NOTE — PROGRESS NOTES
Care Management Follow Up    Length of Stay (days): 20    Expected Discharge Date: 04/12/2022     Concerns to be Addressed:       Patient plan of care discussed at interdisciplinary rounds: Yes    Anticipated Discharge Disposition: Transitional Care     Anticipated Discharge Services: None  Anticipated Discharge DME:      Patient/family educated on Medicare website which has current facility and service quality ratings:    Education Provided on the Discharge Plan:    Patient/Family in Agreement with the Plan: yes    Referrals Placed by CM/SW:    Private pay costs discussed:     Additional Information:  Kristen at [facility] will submit authorization to patient's [employer] and this writer will need to submit a request for authorization thru [employer] the  for her [facility] Medicare Advantage insurance.   Authorizations will be submitted after tomorrow when Dr Bullock is scheduled to see patient and outline the future plan of care in relation to  wound care and frequency and estimated date for the grafting.       Christina Cabrera, LICSW

## 2022-04-11 NOTE — PROGRESS NOTES
[facility]    Medicine Progress Note - Hospitalist Service    Date of Admission:  3/21/2022    Assessment & Plan        Elizabeth Kelley is a 76 year old female with PMHx MS, chronic BLE lymphedema with venous insufficiency and chronic R leg cellulitis, GERD, hyperlipidemia, and depression who underwent previously scheduled excisional debridement of RLE cicumferential venous hypertensive ulceration and application of wound vac on 3/21/22.   Hospitalist service consulted 3/23/22 for AMS ultimately determined to have sepsis 2/2 CAP with respiratory failure. She had been progressing well, completing antibiotic regimen and weaned to room air with discharge planning in progress until 4/3 with worsening systemic evidence of infection and hypoxia, resumed on broad-spectrum antibiotics and imaging suggestive of bilateral pulmonary infiltrates. Also with development of afib with RVR requiring IV medications, transition to IMC. Cardiac workup unrevealing, has since improved from cardiopulmonary perspective and transferred to Med/Surg.     Sepsis secondary to CAP vs aspiration pneumonia, recurrent  Acute hypoxic respiratory failure secondary to above, improving  Hx of MRSA  * 3/23 Tmax 101.5, tachycardic in the 110s, hypoxic to 86% on room air. WBC 18.3, procal 0.17. CXR with patchy airspace opacities in the left mid and lower lung, suspicious for pneumonia. Treated with Zosyn for CAP, repeat CXR 3/28 with new right upper lobe infiltrate and basilar infiltrate lateral views. Received diuretics for volume overload, transitioned to Augmentin on 3/30 with plans for 14 day course to treat LE cellulitis.  * 4/3 with fever to 102, uptrending WBC with mildly elevated procalcitonin. C/O SOB, noted to be hypoxic. COVID-19, RSV, Influenza PCR neg. UA neg. CXR on 4/2 neg. LE wound valuated by vascular surgery, no evidence of infection. CTA Chest neg for PE, with bilateral pulmonary infiltrates L>R, most  consistent with infectious etiology. Blood cultures x2 repeated. Resumed IV Zosyn, Vanco. ID consulted.   * 4/5 Video swallow negative, cleared for reg diet with instructions to sit upright, speech continuing to follow  * 4/6 Weaned to 1L O2. I/O appear innacurate as net value -18L, according to daily weights is +15 lbs with reported weight gain of 2lbs overnight.  * Weight 284--279  - Zosyn transitioned to Unasyn on 4/5  - ID consulted, appreciate input, plan for Abx until 4/8  - Follow blood cultures, NGTD  - Sputum culture as able  - RCAT consulted, encourage pulmonary toilet with IS and acapella.  - Duonebs q4 hrs while awake, PRN albuterol nebs.  - Monitor fluid status.  - IV diuresis with lasix 20mg BID, pending weight trend today will continue one additional day v resume PO lasix  - Strict I&O, daily weights  - Resumed PTA Aldactone  - Robitussin, tessalon perles prn.  - SLP following     Afib with RVR, new diagnosis  NSTEMI  Overnight 4/4 with RRT for development of afib with RVR, rates 130s-150s. In setting of sepsis. EKG with marked ST depression and TWI in lateral leads. Received IV diltiazem bolus with plan to transition to drip, developed three 3-second pauses with conversion to SR. Initiated on heparin drip. Remains in NSR this AM.  * Troponin 17--98  * Lexiscan stress negative for inducible ischemia  * TTE with LVEF 60-65%, no WMAs, no valvular dz, mildly elevated PA pressure  - Cardiology consulted, ischemic workup neg, no indication for AC at this time given setting of sepsis. If afib recurs, would rec AC and tx with amiodarone rather than diltiazem     Encephalopathy, multifactorial, resolved  Altered mentation noted by nursing during hosptialization. Multifactorial in the setting of fever, narcotic pain medications, suspected underlying BOLIVAR, possible dehydration. Received dose of narcan, but did not significantly change pts mentation immediately, though seemed to improve within the hour after IVF  bolus was near completion. Mental status continues to improved.  - Continue expectant management.  - Bipap prn.  - Suspect underlying BOLIVAR, urged her to get evaluated as outpatient.  - Resumed PTA Wellbutrin, Requip as per the patient's request but will closely monitor mentation; low threshold to hold these meds if she becomes confused/lethargic.  - Discontinued PTA lyrica, increased gabapentin by 100mg      Acute blood loss anemia  Chronic normocytic anemia  Baseline 10-11. Down to 8.3 post-procedure. No active bleeding, some bleeding from wound bed with dressing changes. Could be dilutional as checked while receiving IVF bolus   *Received 1u PRBCs 3/25, 3/27, 4/6 for Hgb < 7  *Hgb 9.2  - Additional 1u PRBCs for Hgb < 7  - Discontinued scheduled Celebrex  - Increase PPI proph to BID  - Iron studies suggestive of iron deficiency, started on oral iron supplement.     Hematuria, resolved  Dysuria - resolved  Noted in AM on 3/30/22. Likely 2/2 trauma/irritation from garrison catheter. Has been on empiric Abx, UA 4/1 neg. Resolved as of 3/31. Garrison removed 4/1.  Urinalysis unremarkable on 4/9.     Chronic BLE lymphedema with venous insufficiency and chronic R leg cellulitis   S/P excisional debridement of RLE cicumferential venous hypertensive ulceration and application of wound vac (3/21/22)  S/P intraoperative wound examination, wound debridement, and application of myriad 2 layer graft on 3/28  - Defer routine post-operative cares including wound cares to Dr. Bullock.  - Judicious pain control.  - Gabapentin 400 mg po TID, PTA Lyrica discontinued 4/5 as above  - Continue PTA aldactone and potassium supplement  - IV diuresis as above     Severe Obesity, BMI 50.81  - Follow up with PCP.      MS  Ambulatory at baseline, though some weakness in lower extremities and intermittent upper extremity weakness.     GERD  - Continue PTA Protonix.     Hyperlipidemia  - Continue PTA simvastatin.     Depression  Resumed PTA Celexa,  Wellbutrin and Lyrica on 3/26/22. Had been held for a few days given AMS, resumed when mentation back to baseline. As above, weaned lyrica off and solely on gabapentin 4/5.     RLS  - Continue PTA Requip.    4/11- stable, no new issues. Awaiting placement.  Continue present care.     Diet: Combination Diet Regular Diet; Thin Liquids (level 0) (No straws)    DVT Prophylaxis: Pneumatic Compression Devices  Amato Catheter: Not present  Central Lines: None  Cardiac Monitoring: ACTIVE order. Indication: AMI (NSTEMI/ STEMI) (48 hours)  Code Status: Full Code      Disposition Plan   Expected Discharge: 04/12/2022     Anticipated discharge location: home with family    Delays:     Placement - LTC  Placement - LTAC/ARU            The patient's care was discussed with the Patient.    Timbo Diop MD  Hospitalist Service  [facility]  Securely message with the Vocera Web Console (learn more here)  Text page via [employer] Paging/Directory         Clinically Significant Risk Factors Present on Admission                    ______________________________________________________________________    Interval History   No pain or complaints.     Data reviewed today: I reviewed all medications, new labs and imaging results over the last 24 hours. I personally reviewed no images or EKG's today.    Physical Exam   Vital Signs: Temp: 98.3  F (36.8  C) Temp src: Oral BP: 123/56 Pulse: 75   Resp: 16 SpO2: 93 % O2 Device: Nasal cannula Oxygen Delivery: 2 LPM  Weight: 276 lbs 14.36 oz  Constitutional: awake, alert, cooperative, no apparent distress,  Data   Recent Labs   Lab 04/11/22  0908 04/11/22  0820 04/11/22  0206 04/10/22  0740 04/09/22  0742 04/08/22  0801 04/07/22  1434 04/07/22  1042 04/07/22  0537 04/06/22  0544 04/05/22  1215 04/05/22  0804   WBC  --   --   --   --  10.1  --  11.6* 12.2*  --  11.1*   < >  --    HGB 8.9*  --   --   --  8.5*  --  8.8* 9.2*  --  6.7*   < >  --    MCV  --   --   --   --   89  --  89 88  --  90   < >  --    PLT  --   --   --   --  443  --  428 390  --  376   < >  --    NA  --   --   --   --   --  136  --   --   --  136  --  137   POTASSIUM  --   --   --   --   --  4.5  --   --   --  4.3  --  4.1  4.2   CHLORIDE  --   --   --   --   --  102  --   --   --  102  --  101   CO2  --   --   --   --   --  30  --   --   --  31  --  29   BUN  --   --   --   --   --  29  --   --   --  28  --  27   CR 1.01  --   --  1.02 1.05* 1.03  --   --    < > 0.92  --  0.94   ANIONGAP  --   --   --   --   --  4  --   --   --  3  --  7   JUNI  --   --   --   --   --  9.6  --   --   --  9.2  --  9.1   GLC  --  90 100*  --   --  100*  --   --   --  108*  --  115*    < > = values in this interval not displayed.     No results found for this or any previous visit (from the past 24 hour(s)).  Medications     Reason anticoagulant not prescribed for atrial fibrillation       - MEDICATION INSTRUCTIONS -       - MEDICATION INSTRUCTIONS -         aspirin  81 mg Oral QPM     buPROPion  150 mg Oral QAM     citalopram  40 mg Oral QAM     cyanocobalamin  1,000 mcg Oral Daily     diosmin-hesperidin 450-50  1 capsule Oral BID     ferrous sulfate  325 mg Oral Daily     furosemide  10 mg Oral Daily     gabapentin  400 mg Oral TID     ipratropium - albuterol 0.5 mg/2.5 mg/3 mL  3 mL Nebulization Q4H While awake     Aleksandr  1 packet Oral BID     multivitamin w/minerals  1 tablet Oral Daily     pantoprazole  40 mg Oral BID AC     polyethylene glycol  17 g Oral Daily     potassium chloride ER  20 mEq Oral QPM     potassium chloride ER  40 mEq Oral QAM     rOPINIRole  0.5 mg Oral BID     rOPINIRole  1 mg Oral At Bedtime     senna-docusate  1 tablet Oral BID     simvastatin  40 mg Oral At Bedtime     sodium chloride (PF)  3 mL Intracatheter Q8H     spironolactone  25 mg Oral Daily     vitamin B complex with vitamin C  1 tablet Oral Daily     Vitamin D3  250 mcg Oral Daily

## 2022-04-11 NOTE — PLAN OF CARE
Goal Outcome Evaluation:      DATE & TIME: 4/11/2022 8525-0311  Cognitive Concerns/ Orientation: A&Ox4  BEHAVIOR & AGGRESSION TOOL COLOR: Green    ABNL VS/O2: 2L O2 sating low 90s, CPAP overnight. Other VSS.   MOBILITY: Up with 1/walker/gait belt.   PAIN MANAGMENT: slight pain overnight declined intervention.   -Wound cares are very painful, premedicated with Dilaudid and tylenol per pt request,   DIET: regular with thin liquids, aspiration precautions, no straws.  BOWEL/BLADDER: Incontinent of bladder at times, purewick used intermittently (when in bed at night). Up to BR during day.   ABNL LAB: no labs drawn today   DRAIN/DEVICES: PIV LUE saline locked.   TELEMETRY RHYTHM: NSR   SKIN: Wound cares on RLE and devon area done per WOC order.   TESTS/PROCEDURES: NA  D/C DAY/GOALS/PLACE: TBD, pending  TCU vs LTACH placement, SW following.  OTHER IMPORTANT INFO: Maintained Contact isolation for Hx of MRSA. PT/WOC following. Lungs diminished. Bowel sounds pos, CMS intact. Used CPAP overnight.

## 2022-04-11 NOTE — PROGRESS NOTES
[facility])    Patient was referred to [facility] for review. Pt is making daily gains and her care needs will require a prior authorization for her to be accepted to [facility] level of care.  We will pursue a prior authorization today from her insurance.    Kristen Fiore, PT  LTACH Referral Specialist    [address] [address]  [email or web address]  Office: [phone]  Fax: [phone]     CONFIDENTIAL Protected under [place] Statute  145.61 et seq

## 2022-04-11 NOTE — TELEPHONE ENCOUNTER
Patient called regarding her appointment for tomorrow with Dr. Bullock. She is currently in the hospital. Wondering what to do? Can Ara see her as an inpatient?

## 2022-04-11 NOTE — PLAN OF CARE
Goal Outcome Evaluation: reviewed with patient   DATE & TIME: 4/11/2022 days     Cognitive Concerns/ Orientation : alert and oriented x 4    BEHAVIOR & AGGRESSION TOOL COLOR:  green   CIWA SCORE:  na    ABNL VS/O2:  VSS 02 at 1-2 liters   MOBILITY: up with sba and walker and gaitbelt   PAIN MANAGMENT: medicated prior to dressing change and had some tylenol after for continued pain.  DIET:  Regular  BOWEL/BLADDER: pure wick at night. Continent during the day  ABNL LAB/BG:  Hgb 8.9 creat 1.01  DRAIN/DEVICES: SL  TELEMETRY RHYTHM:  SR   SKIN:  devon area red and flaking skin, right lower leg wound moderate drainage serous/pink color. Wound bed is red and swollen.   TESTS/PROCEDURES:  none   D/C DATE:  1-2 days   Discharge Barriers:  needs to have placement and acceptance at a facility.   OTHER IMPORTANT INFO:  pt up in the chair for most of the shift, able to get up to the bedside commode. Pt complained of pain and  medicated prior to dressing change. Still having pain after and medicated with tylenol. Ate well for both meals. Pt able to be up with sba and walker.

## 2022-04-12 ENCOUNTER — APPOINTMENT (OUTPATIENT)
Dept: PHYSICAL THERAPY | Facility: CLINIC | Age: 77
DRG: 264 | End: 2022-04-12
Attending: SURGERY
Payer: COMMERCIAL

## 2022-04-12 LAB
CREAT SERPL-MCNC: 0.94 MG/DL (ref 0.52–1.04)
GFR SERPL CREATININE-BSD FRML MDRD: 63 ML/MIN/1.73M2
GLUCOSE BLDC GLUCOMTR-MCNC: 102 MG/DL (ref 70–99)
GLUCOSE BLDC GLUCOMTR-MCNC: 102 MG/DL (ref 70–99)
GLUCOSE BLDC GLUCOMTR-MCNC: 84 MG/DL (ref 70–99)

## 2022-04-12 PROCEDURE — 250N000013 HC RX MED GY IP 250 OP 250 PS 637: Performed by: HOSPITALIST

## 2022-04-12 PROCEDURE — 999N000157 HC STATISTIC RCP TIME EA 10 MIN

## 2022-04-12 PROCEDURE — 94640 AIRWAY INHALATION TREATMENT: CPT

## 2022-04-12 PROCEDURE — 82565 ASSAY OF CREATININE: CPT | Performed by: SURGERY

## 2022-04-12 PROCEDURE — 99232 SBSQ HOSP IP/OBS MODERATE 35: CPT | Performed by: HOSPITALIST

## 2022-04-12 PROCEDURE — 94660 CPAP INITIATION&MGMT: CPT

## 2022-04-12 PROCEDURE — 94640 AIRWAY INHALATION TREATMENT: CPT | Mod: 76

## 2022-04-12 PROCEDURE — 97530 THERAPEUTIC ACTIVITIES: CPT | Mod: GP | Performed by: PHYSICAL THERAPIST

## 2022-04-12 PROCEDURE — 250N000013 HC RX MED GY IP 250 OP 250 PS 637: Performed by: SURGERY

## 2022-04-12 PROCEDURE — 36415 COLL VENOUS BLD VENIPUNCTURE: CPT | Performed by: SURGERY

## 2022-04-12 PROCEDURE — 97116 GAIT TRAINING THERAPY: CPT | Mod: GP | Performed by: PHYSICAL THERAPIST

## 2022-04-12 PROCEDURE — 250N000013 HC RX MED GY IP 250 OP 250 PS 637: Performed by: PHYSICIAN ASSISTANT

## 2022-04-12 PROCEDURE — 120N000001 HC R&B MED SURG/OB

## 2022-04-12 PROCEDURE — 250N000009 HC RX 250: Performed by: SURGERY

## 2022-04-12 RX ADMIN — HYDROMORPHONE HYDROCHLORIDE 4 MG: 2 TABLET ORAL at 20:06

## 2022-04-12 RX ADMIN — HYDROMORPHONE HYDROCHLORIDE 4 MG: 2 TABLET ORAL at 10:00

## 2022-04-12 RX ADMIN — SIMVASTATIN 40 MG: 40 TABLET, FILM COATED ORAL at 21:09

## 2022-04-12 RX ADMIN — GABAPENTIN 400 MG: 300 CAPSULE ORAL at 21:01

## 2022-04-12 RX ADMIN — IPRATROPIUM BROMIDE AND ALBUTEROL SULFATE 3 ML: .5; 3 SOLUTION RESPIRATORY (INHALATION) at 19:41

## 2022-04-12 RX ADMIN — CYANOCOBALAMIN TAB 1000 MCG 1000 MCG: 1000 TAB at 09:31

## 2022-04-12 RX ADMIN — GABAPENTIN 400 MG: 300 CAPSULE ORAL at 16:00

## 2022-04-12 RX ADMIN — Medication 1 CAPSULE: at 21:01

## 2022-04-12 RX ADMIN — CITALOPRAM HYDROBROMIDE 40 MG: 20 TABLET, FILM COATED ORAL at 09:31

## 2022-04-12 RX ADMIN — Medication 250 MCG: at 09:32

## 2022-04-12 RX ADMIN — ACETAMINOPHEN 650 MG: 325 TABLET, FILM COATED ORAL at 10:00

## 2022-04-12 RX ADMIN — BUPROPION HYDROCHLORIDE 150 MG: 150 TABLET, EXTENDED RELEASE ORAL at 09:31

## 2022-04-12 RX ADMIN — GABAPENTIN 400 MG: 300 CAPSULE ORAL at 09:32

## 2022-04-12 RX ADMIN — PANTOPRAZOLE SODIUM 40 MG: 40 TABLET, DELAYED RELEASE ORAL at 16:00

## 2022-04-12 RX ADMIN — MULTIPLE VITAMINS W/ MINERALS TAB 1 TABLET: TAB at 10:00

## 2022-04-12 RX ADMIN — IPRATROPIUM BROMIDE AND ALBUTEROL SULFATE 3 ML: .5; 3 SOLUTION RESPIRATORY (INHALATION) at 11:21

## 2022-04-12 RX ADMIN — Medication 1 CAPSULE: at 10:00

## 2022-04-12 RX ADMIN — ROPINIROLE HYDROCHLORIDE 1 MG: 0.5 TABLET, FILM COATED ORAL at 21:09

## 2022-04-12 RX ADMIN — ROPINIROLE HYDROCHLORIDE 0.5 MG: 0.5 TABLET, FILM COATED ORAL at 09:30

## 2022-04-12 RX ADMIN — FUROSEMIDE 10 MG: 20 TABLET ORAL at 09:31

## 2022-04-12 RX ADMIN — SPIRONOLACTONE 25 MG: 25 TABLET ORAL at 09:32

## 2022-04-12 RX ADMIN — ASPIRIN 81 MG CHEWABLE TABLET 81 MG: 81 TABLET CHEWABLE at 21:01

## 2022-04-12 RX ADMIN — FERROUS SULFATE TAB 325 MG (65 MG ELEMENTAL FE) 325 MG: 325 (65 FE) TAB at 09:32

## 2022-04-12 RX ADMIN — MICONAZOLE NITRATE: 20 POWDER TOPICAL at 16:04

## 2022-04-12 RX ADMIN — POTASSIUM CHLORIDE 40 MEQ: 1500 TABLET, EXTENDED RELEASE ORAL at 09:31

## 2022-04-12 RX ADMIN — PANTOPRAZOLE SODIUM 40 MG: 40 TABLET, DELAYED RELEASE ORAL at 06:19

## 2022-04-12 RX ADMIN — Medication 1 PACKET: at 11:00

## 2022-04-12 RX ADMIN — Medication 1 PACKET: at 21:13

## 2022-04-12 RX ADMIN — MICONAZOLE NITRATE: 20 POWDER TOPICAL at 21:01

## 2022-04-12 RX ADMIN — B-COMPLEX W/ C & FOLIC ACID TAB 1 TABLET: TAB at 10:02

## 2022-04-12 RX ADMIN — FUROSEMIDE 10 MG: 20 TABLET ORAL at 16:00

## 2022-04-12 RX ADMIN — ROPINIROLE HYDROCHLORIDE 0.5 MG: 0.5 TABLET, FILM COATED ORAL at 13:25

## 2022-04-12 RX ADMIN — POTASSIUM CHLORIDE 20 MEQ: 1500 TABLET, EXTENDED RELEASE ORAL at 21:01

## 2022-04-12 ASSESSMENT — ACTIVITIES OF DAILY LIVING (ADL)
ADLS_ACUITY_SCORE: 16
ADLS_ACUITY_SCORE: 17
ADLS_ACUITY_SCORE: 17
ADLS_ACUITY_SCORE: 16
ADLS_ACUITY_SCORE: 17
ADLS_ACUITY_SCORE: 17
ADLS_ACUITY_SCORE: 16
ADLS_ACUITY_SCORE: 17
ADLS_ACUITY_SCORE: 17
ADLS_ACUITY_SCORE: 16
ADLS_ACUITY_SCORE: 17
ADLS_ACUITY_SCORE: 16
ADLS_ACUITY_SCORE: 17
ADLS_ACUITY_SCORE: 16
ADLS_ACUITY_SCORE: 16
ADLS_ACUITY_SCORE: 17
ADLS_ACUITY_SCORE: 16
ADLS_ACUITY_SCORE: 17

## 2022-04-12 NOTE — PROGRESS NOTES
RT note:    Patient refusing nebs at times, using bipap for short periods at night.  VSS on 2 LPM NC, BS diminished.  RT to follow.    Madison Larsen, RT'  11:26 AM 04/12/22

## 2022-04-12 NOTE — PLAN OF CARE
Goal Outcome Evaluation:    Plan of Care Reviewed With: patient     Overall Patient Progress: improving         DATE & TIME: 4/12/2022 0400-2785  Cognitive Concerns/ Orientation: A&Ox4  BEHAVIOR & AGGRESSION TOOL COLOR: Green    ABNL VS/O2: VSS 02 at 2L. Lungs diminished.   MOBILITY: Up with 1/walker/gait belt.   PAIN MANAGMENT: medicate prior to dressing change, she had 4 mg dilaudid and tylenol prior for pain rated 5/10.  DIET: regular with thin liquids, aspiration precautions, no straws. Good appetite.  BOWEL/BLADDER: Incontinent of bladder at times, purewick used intermittently (when in bed at night). Up to BSC during day. BM 4/11  ABNL LAB: Hgb 8.9 creat 0.94, albumin 1.9, procal 1.08, trop 98.  DRAIN/DEVICES: PIV removed, MD aware, no need to replace  TELEMETRY RHYTHM: N/A  SKIN: Wound cares on RLE and devon area done per WOC order. Miconazole powder ordered.  TESTS/PROCEDURES: NA  D/C DAY/GOALS/PLACE: TBD, pending  TCU vs LTACH placement, SW following. Hoping for pt to be off O2 first.   OTHER IMPORTANT INFO: Maintained Contact isolation for Hx of MRSA. PT/WOC following. CMS intact. Uses CPAP overnight which is new for pt this stay.

## 2022-04-12 NOTE — PROGRESS NOTES
[facility]    Medicine Progress Note - Hospitalist Service    Date of Admission:  3/21/2022    Assessment & Plan        Elizabeth Kelley is a 76 year old female with PMHx MS, chronic BLE lymphedema with venous insufficiency and chronic R leg cellulitis, GERD, hyperlipidemia, and depression who underwent previously scheduled excisional debridement of RLE cicumferential venous hypertensive ulceration and application of wound vac on 3/21/22.   Hospitalist service consulted 3/23/22 for AMS ultimately determined to have sepsis 2/2 CAP with respiratory failure. She had been progressing well, completing antibiotic regimen and weaned to room air with discharge planning in progress until 4/3 with worsening systemic evidence of infection and hypoxia, resumed on broad-spectrum antibiotics and imaging suggestive of bilateral pulmonary infiltrates. Also with development of afib with RVR requiring IV medications, transition to IMC. Cardiac workup unrevealing, has since improved from cardiopulmonary perspective and transferred to Med/Surg.     Sepsis secondary to CAP vs aspiration pneumonia, recurrent  Acute hypoxic respiratory failure secondary to above, improving  Hx of MRSA  * 3/23 Tmax 101.5, tachycardic in the 110s, hypoxic to 86% on room air. WBC 18.3, procal 0.17. CXR with patchy airspace opacities in the left mid and lower lung, suspicious for pneumonia. Treated with Zosyn for CAP, repeat CXR 3/28 with new right upper lobe infiltrate and basilar infiltrate lateral views. Received diuretics for volume overload, transitioned to Augmentin on 3/30 with plans for 14 day course to treat LE cellulitis.  * 4/3 with fever to 102, uptrending WBC with mildly elevated procalcitonin. C/O SOB, noted to be hypoxic. COVID-19, RSV, Influenza PCR neg. UA neg. CXR on 4/2 neg. LE wound valuated by vascular surgery, no evidence of infection. CTA Chest neg for PE, with bilateral pulmonary infiltrates L>R, most  consistent with infectious etiology. Blood cultures x2 repeated. Resumed IV Zosyn, Vanco. ID consulted.   * 4/5 Video swallow negative, cleared for reg diet with instructions to sit upright, speech continuing to follow  * 4/6 Weaned to 1L O2. I/O appear innacurate as net value -18L, according to daily weights is +15 lbs with reported weight gain of 2lbs overnight.  * Weight 284--279  - Zosyn transitioned to Unasyn on 4/5  - ID consulted, appreciate input, plan for Abx until 4/8  - Follow blood cultures, NGTD  - Sputum culture as able  - RCAT consulted, encourage pulmonary toilet with IS and acapella.  - Duonebs q4 hrs while awake, PRN albuterol nebs.  - Monitor fluid status.  - IV diuresis with lasix 20mg BID, pending weight trend today will continue one additional day v resume PO lasix  - Strict I&O, daily weights  - Resumed PTA Aldactone  - Robitussin, tessalon perles prn.  - SLP following     Afib with RVR, new diagnosis  NSTEMI  Overnight 4/4 with RRT for development of afib with RVR, rates 130s-150s. In setting of sepsis. EKG with marked ST depression and TWI in lateral leads. Received IV diltiazem bolus with plan to transition to drip, developed three 3-second pauses with conversion to SR. Initiated on heparin drip. Remains in NSR this AM.  * Troponin 17--98  * Lexiscan stress negative for inducible ischemia  * TTE with LVEF 60-65%, no WMAs, no valvular dz, mildly elevated PA pressure  - Cardiology consulted, ischemic workup neg, no indication for AC at this time given setting of sepsis. If afib recurs, would rec AC and tx with amiodarone rather than diltiazem     Encephalopathy, multifactorial, resolved  Altered mentation noted by nursing during hosptialization. Multifactorial in the setting of fever, narcotic pain medications, suspected underlying BOLIVAR, possible dehydration. Received dose of narcan, but did not significantly change pts mentation immediately, though seemed to improve within the hour after IVF  bolus was near completion. Mental status continues to improved.  - Continue expectant management.  - Bipap prn.  - Suspect underlying BOLIVAR, urged her to get evaluated as outpatient.  - Resumed PTA Wellbutrin, Requip as per the patient's request but will closely monitor mentation; low threshold to hold these meds if she becomes confused/lethargic.  - Discontinued PTA lyrica, increased gabapentin by 100mg      Acute blood loss anemia  Chronic normocytic anemia  Baseline 10-11. Down to 8.3 post-procedure. No active bleeding, some bleeding from wound bed with dressing changes. Could be dilutional as checked while receiving IVF bolus   *Received 1u PRBCs 3/25, 3/27, 4/6 for Hgb < 7  *Hgb 9.2  - Additional 1u PRBCs for Hgb < 7  - Discontinued scheduled Celebrex  - Increase PPI proph to BID  - Iron studies suggestive of iron deficiency, started on oral iron supplement.     Hematuria, resolved  Dysuria - resolved  Noted in AM on 3/30/22. Likely 2/2 trauma/irritation from garrison catheter. Has been on empiric Abx, UA 4/1 neg. Resolved as of 3/31. Garrison removed 4/1.  Urinalysis unremarkable on 4/9.     Chronic BLE lymphedema with venous insufficiency and chronic R leg cellulitis   S/P excisional debridement of RLE cicumferential venous hypertensive ulceration and application of wound vac (3/21/22)  S/P intraoperative wound examination, wound debridement, and application of myriad 2 layer graft on 3/28  - Defer routine post-operative cares including wound cares to Dr. Bullock.  - Judicious pain control.  - Gabapentin 400 mg po TID, PTA Lyrica discontinued 4/5 as above  - Continue PTA aldactone and potassium supplement  - IV diuresis as above     Severe Obesity, BMI 50.81  - Follow up with PCP.      MS  Ambulatory at baseline, though some weakness in lower extremities and intermittent upper extremity weakness.     GERD  - Continue PTA Protonix.     Hyperlipidemia  - Continue PTA simvastatin.     Depression  Resumed PTA Celexa,  Wellbutrin and Lyrica on 3/26/22. Had been held for a few days given AMS, resumed when mentation back to baseline. As above, weaned lyrica off and solely on gabapentin 4/5.     RLS  - Continue PTA Requip.    4/12- stable, labs reviewed.  Awaiting placement. Continue present care.     Diet: Combination Diet Regular Diet; Thin Liquids (level 0) (No straws)    DVT Prophylaxis: Pneumatic Compression Devices  Amato Catheter: Not present  Central Lines: None  Cardiac Monitoring: ACTIVE order. Indication: AMI (NSTEMI/ STEMI) (48 hours)  Code Status: Full Code      Disposition Plan   Expected Discharge: 04/13/2022     Anticipated discharge location: home with family    Delays:     Placement - LTC  Placement - LTAC/ARU            The patient's care was discussed with the Patient.    Timbo Diop MD  Hospitalist Service  [facility]  Securely message with the Vocera Web Console (learn more here)  Text page via [employer] Paging/Directory         Clinically Significant Risk Factors Present on Admission                    ______________________________________________________________________    Interval History   No pain or complaints.     Data reviewed today: I reviewed all medications, new labs and imaging results over the last 24 hours. I personally reviewed no images or EKG's today.    Physical Exam   Vital Signs: Temp: 97.9  F (36.6  C) Temp src: Axillary BP: 133/59 Pulse: 73   Resp: 18 SpO2: 96 % O2 Device: Nasal cannula Oxygen Delivery: 2 LPM  Weight: 276 lbs 14.36 oz  Constitutional: awake, alert, cooperative, no apparent distress,  Data   Recent Labs   Lab 04/12/22  0920 04/12/22  0859 04/12/22  0218 04/11/22  1837 04/11/22  1202 04/11/22  0908 04/11/22  0206 04/10/22  0740 04/09/22  0742 04/08/22  0801 04/07/22  1434 04/07/22  1042 04/07/22  0537 04/06/22  0544   WBC  --   --   --   --   --   --   --   --  10.1  --  11.6* 12.2*  --  11.1*   HGB  --   --   --   --   --  8.9*  --   --  8.5*   --  8.8* 9.2*  --  6.7*   MCV  --   --   --   --   --   --   --   --  89  --  89 88  --  90   PLT  --   --   --   --   --   --   --   --  443  --  428 390  --  376   NA  --   --   --   --   --   --   --   --   --  136  --   --   --  136   POTASSIUM  --   --   --   --   --   --   --   --   --  4.5  --   --   --  4.3   CHLORIDE  --   --   --   --   --   --   --   --   --  102  --   --   --  102   CO2  --   --   --   --   --   --   --   --   --  30  --   --   --  31   BUN  --   --   --   --   --   --   --   --   --  29  --   --   --  28   CR 0.94  --   --   --   --  1.01  --  1.02 1.05* 1.03  --   --    < > 0.92   ANIONGAP  --   --   --   --   --   --   --   --   --  4  --   --   --  3   JUNI  --   --   --   --   --   --   --   --   --  9.6  --   --   --  9.2   GLC  --  84 102* 89   < >  --    < >  --   --  100*  --   --   --  108*    < > = values in this interval not displayed.     No results found for this or any previous visit (from the past 24 hour(s)).  Medications     Reason anticoagulant not prescribed for atrial fibrillation       - MEDICATION INSTRUCTIONS -       - MEDICATION INSTRUCTIONS -         aspirin  81 mg Oral QPM     buPROPion  150 mg Oral QAM     citalopram  40 mg Oral QAM     cyanocobalamin  1,000 mcg Oral Daily     diosmin-hesperidin 450-50  1 capsule Oral BID     ferrous sulfate  325 mg Oral Daily     furosemide  10 mg Oral BID     gabapentin  400 mg Oral TID     ipratropium - albuterol 0.5 mg/2.5 mg/3 mL  3 mL Nebulization Q4H While awake     Aleksandr  1 packet Oral BID     multivitamin w/minerals  1 tablet Oral Daily     pantoprazole  40 mg Oral BID AC     polyethylene glycol  17 g Oral Daily     potassium chloride ER  20 mEq Oral QPM     potassium chloride ER  40 mEq Oral QAM     rOPINIRole  0.5 mg Oral BID     rOPINIRole  1 mg Oral At Bedtime     senna-docusate  1 tablet Oral BID     simvastatin  40 mg Oral At Bedtime     sodium chloride (PF)  3 mL Intracatheter Q8H     spironolactone  25 mg Oral  Daily     vitamin B complex with vitamin C  1 tablet Oral Daily     Vitamin D3  250 mcg Oral Daily

## 2022-04-12 NOTE — PROGRESS NOTES
Significant right leg granulation tissue with BID Vashe HOCL dressing changes  Applied 2 layer wrap given edema, will change again Wed - disposition to be determined  Ara

## 2022-04-12 NOTE — PLAN OF CARE
Goal Outcome Evaluation:      DATE & TIME: 4/12/2022 2697-9880  Cognitive Concerns/ Orientation: A&Ox4  BEHAVIOR & AGGRESSION TOOL COLOR: Green    ABNL VS/O2: VSS 02 at 2L. Requested for Cpap to be taken off and resumed N.C   MOBILITY: Up with 1/walker/gait belt.   PAIN MANAGMENT: medicate prior to dressing change.   DIET: regular with thin liquids, aspiration precautions, no straws.  BOWEL/BLADDER: Incontinent of bladder at times, purewick used intermittently (when in bed at night). Up to BSC during day.   ABNL LAB: Hgb 8.9 creat 1.01, albumin 1.9, procal 1.08, trop 98.  DRAIN/DEVICES: PIV LUE saline locked.   TELEMETRY RHYTHM: NSR   SKIN: Wound cares on RLE and devon area done per WOC order BID.   TESTS/PROCEDURES: NA  D/C DAY/GOALS/PLACE: TBD, pending  TCU vs LTACH placement, SW following. Hoping for pt to be off O2 first.   OTHER IMPORTANT INFO: Maintained Contact isolation for Hx of MRSA. PT/WOC following. Lungs diminished. CMS intact. Uses CPAP overnight which is new for pt this stay- requested for Cpap to be taken off.

## 2022-04-12 NOTE — PLAN OF CARE
Goal Outcome Evaluation:           DATE & TIME: 4/11/2022 3-11pm shift  Cognitive Concerns/ Orientation: A&Ox4  BEHAVIOR & AGGRESSION TOOL COLOR: Green    ABNL VS/O2: VSS 02 at 2 liters 93% resumed home dose lasix BID instead of once a day.  MOBILITY: Up with 1/walker/gait belt.   PAIN MANAGMENT: medicated prior to dressing change. 4mg dilaudid and tylenol prn  DIET: regular with thin liquids, aspiration precautions, no straws.  BOWEL/BLADDER: Incontinent of bladder at times, purewick used intermittently (when in bed at night). Up to BSC during day.   ABNL LAB: Hgb 8.9 creat 1.01, albumin 1.9, procal 1.08, trop 98.  DRAIN/DEVICES: PIV LUE saline locked.   TELEMETRY RHYTHM: NSR   SKIN: Wound cares on RLE and devon area done per WOC order BID.   TESTS/PROCEDURES: NA  D/C DAY/GOALS/PLACE: TBD, pending  TCU vs LTACH placement, SW following. Hoping for pt to be off O2 first.   OTHER IMPORTANT INFO: Maintained Contact isolation for Hx of MRSA. PT/WOC following. Lungs diminished. CMS intact. Uses CPAP overnight which is new for pt this stay.

## 2022-04-12 NOTE — PLAN OF CARE
Goal Outcome Evaluation:                    Cognitive Concerns/ Orientation: A&Ox4  BEHAVIOR & AGGRESSION TOOL COLOR: Green    ABNL VS/O2: VSS 02 at 2L. Lungs diminished.   MOBILITY: Up with 1/walker/gait belt.   PAIN MANAGMENT: medicate prior to dressing change, she had 4 mg dilaudid and tylenol prior for pain rated 5/10. DRESSING DONE BY MD EVENING  DIET: regular with thin liquids, aspiration precautions, no straws. Good appetite.  BOWEL/BLADDER: Incontinent of bladder at times, purewick used intermittently (when in bed at night). Up to BSC during day. BM 4/11  ABNL LAB: Hgb 8.9 creat 1.01, albumin 1.9, procal 1.08, trop 98.  DRAIN/DEVICES: PIV removed, MD aware, no need to replace  TELEMETRY RHYTHM: N/A  SKIN: Wound cares on RLE and devon area done per WOC order. Miconazole powder ordered.  TESTS/PROCEDURES: NA  D/C DAY/GOALS/PLACE: TBD, pending  TCU vs LTACH placement, SW following. Hoping for pt to be off O2 first.   OTHER IMPORTANT INFO: Maintained Contact isolation for Hx of MRSA. PT/WOC following. CMS intact. Uses CPAP overnight which is new for pt this stay.

## 2022-04-13 LAB
CREAT SERPL-MCNC: 0.9 MG/DL (ref 0.52–1.04)
GFR SERPL CREATININE-BSD FRML MDRD: 67 ML/MIN/1.73M2
GLUCOSE BLDC GLUCOMTR-MCNC: 113 MG/DL (ref 70–99)
GLUCOSE BLDC GLUCOMTR-MCNC: 99 MG/DL (ref 70–99)

## 2022-04-13 PROCEDURE — 250N000009 HC RX 250: Performed by: SURGERY

## 2022-04-13 PROCEDURE — 94660 CPAP INITIATION&MGMT: CPT

## 2022-04-13 PROCEDURE — 94640 AIRWAY INHALATION TREATMENT: CPT | Mod: 76

## 2022-04-13 PROCEDURE — 250N000013 HC RX MED GY IP 250 OP 250 PS 637: Performed by: SURGERY

## 2022-04-13 PROCEDURE — 999N000157 HC STATISTIC RCP TIME EA 10 MIN

## 2022-04-13 PROCEDURE — 250N000013 HC RX MED GY IP 250 OP 250 PS 637: Performed by: HOSPITALIST

## 2022-04-13 PROCEDURE — 99232 SBSQ HOSP IP/OBS MODERATE 35: CPT | Performed by: HOSPITALIST

## 2022-04-13 PROCEDURE — 36415 COLL VENOUS BLD VENIPUNCTURE: CPT | Performed by: SURGERY

## 2022-04-13 PROCEDURE — 250N000013 HC RX MED GY IP 250 OP 250 PS 637: Performed by: PHYSICIAN ASSISTANT

## 2022-04-13 PROCEDURE — 82565 ASSAY OF CREATININE: CPT | Performed by: SURGERY

## 2022-04-13 PROCEDURE — 94640 AIRWAY INHALATION TREATMENT: CPT

## 2022-04-13 PROCEDURE — 120N000001 HC R&B MED SURG/OB

## 2022-04-13 RX ADMIN — IPRATROPIUM BROMIDE AND ALBUTEROL SULFATE 3 ML: .5; 3 SOLUTION RESPIRATORY (INHALATION) at 11:44

## 2022-04-13 RX ADMIN — Medication 1 PACKET: at 09:21

## 2022-04-13 RX ADMIN — HYDROMORPHONE HYDROCHLORIDE 4 MG: 2 TABLET ORAL at 22:05

## 2022-04-13 RX ADMIN — MICONAZOLE NITRATE: 20 POWDER TOPICAL at 22:21

## 2022-04-13 RX ADMIN — POTASSIUM CHLORIDE 40 MEQ: 1500 TABLET, EXTENDED RELEASE ORAL at 09:10

## 2022-04-13 RX ADMIN — ROPINIROLE HYDROCHLORIDE 0.5 MG: 0.5 TABLET, FILM COATED ORAL at 09:13

## 2022-04-13 RX ADMIN — PANTOPRAZOLE SODIUM 40 MG: 40 TABLET, DELAYED RELEASE ORAL at 06:58

## 2022-04-13 RX ADMIN — PANTOPRAZOLE SODIUM 40 MG: 40 TABLET, DELAYED RELEASE ORAL at 15:50

## 2022-04-13 RX ADMIN — SIMVASTATIN 40 MG: 40 TABLET, FILM COATED ORAL at 22:05

## 2022-04-13 RX ADMIN — GABAPENTIN 400 MG: 300 CAPSULE ORAL at 22:05

## 2022-04-13 RX ADMIN — B-COMPLEX W/ C & FOLIC ACID TAB 1 TABLET: TAB at 09:09

## 2022-04-13 RX ADMIN — POTASSIUM CHLORIDE 20 MEQ: 1500 TABLET, EXTENDED RELEASE ORAL at 20:16

## 2022-04-13 RX ADMIN — FERROUS SULFATE TAB 325 MG (65 MG ELEMENTAL FE) 325 MG: 325 (65 FE) TAB at 09:12

## 2022-04-13 RX ADMIN — Medication 250 MCG: at 09:08

## 2022-04-13 RX ADMIN — CITALOPRAM HYDROBROMIDE 40 MG: 20 TABLET, FILM COATED ORAL at 09:12

## 2022-04-13 RX ADMIN — CYANOCOBALAMIN TAB 1000 MCG 1000 MCG: 1000 TAB at 09:09

## 2022-04-13 RX ADMIN — Medication 1 CAPSULE: at 22:20

## 2022-04-13 RX ADMIN — ACETAMINOPHEN 650 MG: 325 TABLET, FILM COATED ORAL at 12:36

## 2022-04-13 RX ADMIN — FUROSEMIDE 10 MG: 20 TABLET ORAL at 15:50

## 2022-04-13 RX ADMIN — Medication 1 CAPSULE: at 09:19

## 2022-04-13 RX ADMIN — ACETAMINOPHEN 650 MG: 325 TABLET, FILM COATED ORAL at 17:04

## 2022-04-13 RX ADMIN — ROPINIROLE HYDROCHLORIDE 0.5 MG: 0.5 TABLET, FILM COATED ORAL at 12:38

## 2022-04-13 RX ADMIN — HYDROMORPHONE HYDROCHLORIDE 4 MG: 2 TABLET ORAL at 12:37

## 2022-04-13 RX ADMIN — FUROSEMIDE 10 MG: 20 TABLET ORAL at 09:13

## 2022-04-13 RX ADMIN — Medication 1 PACKET: at 22:20

## 2022-04-13 RX ADMIN — IPRATROPIUM BROMIDE AND ALBUTEROL SULFATE 3 ML: .5; 3 SOLUTION RESPIRATORY (INHALATION) at 07:35

## 2022-04-13 RX ADMIN — IPRATROPIUM BROMIDE AND ALBUTEROL SULFATE 3 ML: .5; 3 SOLUTION RESPIRATORY (INHALATION) at 20:27

## 2022-04-13 RX ADMIN — IPRATROPIUM BROMIDE AND ALBUTEROL SULFATE 3 ML: .5; 3 SOLUTION RESPIRATORY (INHALATION) at 16:18

## 2022-04-13 RX ADMIN — ASPIRIN 81 MG CHEWABLE TABLET 81 MG: 81 TABLET CHEWABLE at 20:16

## 2022-04-13 RX ADMIN — BUPROPION HYDROCHLORIDE 150 MG: 150 TABLET, EXTENDED RELEASE ORAL at 09:10

## 2022-04-13 RX ADMIN — ROPINIROLE HYDROCHLORIDE 1 MG: 0.5 TABLET, FILM COATED ORAL at 22:05

## 2022-04-13 RX ADMIN — SPIRONOLACTONE 25 MG: 25 TABLET ORAL at 09:09

## 2022-04-13 RX ADMIN — MULTIPLE VITAMINS W/ MINERALS TAB 1 TABLET: TAB at 09:13

## 2022-04-13 RX ADMIN — GABAPENTIN 400 MG: 300 CAPSULE ORAL at 09:10

## 2022-04-13 RX ADMIN — MICONAZOLE NITRATE: 20 POWDER TOPICAL at 09:23

## 2022-04-13 RX ADMIN — GABAPENTIN 400 MG: 300 CAPSULE ORAL at 15:50

## 2022-04-13 ASSESSMENT — ACTIVITIES OF DAILY LIVING (ADL)
ADLS_ACUITY_SCORE: 12
ADLS_ACUITY_SCORE: 12
ADLS_ACUITY_SCORE: 16
ADLS_ACUITY_SCORE: 16
ADLS_ACUITY_SCORE: 12
ADLS_ACUITY_SCORE: 11
ADLS_ACUITY_SCORE: 12
ADLS_ACUITY_SCORE: 11
ADLS_ACUITY_SCORE: 12
ADLS_ACUITY_SCORE: 11
ADLS_ACUITY_SCORE: 16
ADLS_ACUITY_SCORE: 13
ADLS_ACUITY_SCORE: 11
ADLS_ACUITY_SCORE: 11
ADLS_ACUITY_SCORE: 12
ADLS_ACUITY_SCORE: 13
ADLS_ACUITY_SCORE: 12

## 2022-04-13 NOTE — PLAN OF CARE
DATE & TIME:4/13/22 2976-4625   Cognitive Concerns/ Orientation: A&Ox4  BEHAVIOR & AGGRESSION TOOL COLOR: Green    ABNL VS/O2: VSS 02 at 2L sats around 90-91%. Lungs diminished.   MOBILITY: Up with 1/walker/gait belt. Up in chair most of shift. Up to BSC x1  PAIN MANAGMENT: Diladid 4 mg x1, Tylenol x1 (medicate prior to dressing change).   DIET: Regular with thin liquids, aspiration precautions, no straws.   BOWEL/BLADDER: Incontinent of bladder at times, purewick used intermittently  (mostly when in bed at night). Up to BSC x1. I large BM today 4/13.  ABNL LAB: from 4/11/22: Hgb 8.9 creat 1.01, albumin 1.9, procal 1.08, trop 98. BG 99, 113.  DRAIN/DEVICES: None   TELEMETRY RHYTHM: N/A  SKIN: Wound cares on RLE done by MD today. Michelle area done per WOC order. Miconazole powder available.  TESTS/PROCEDURES: NA  D/C DAY/GOALS/PLACE: TBD, pending  TCU vs LTACH placement, SW following. Trying to wean off O2 first.   OTHER IMPORTANT INFO: Contact isolation for Hx of MRSA. PT/WOC following. CMS intact. Uses CPAP overnight which is new for pt this stay.

## 2022-04-13 NOTE — PLAN OF CARE
Goal Outcome Evaluation:      DATE & TIME:4/12/22-4/13/22 3011-1445   Cognitive Concerns/ Orientation: A&Ox4  BEHAVIOR & AGGRESSION TOOL COLOR: Green    ABNL VS/O2: VSS 02 at 2L. Lungs diminished.   MOBILITY: Up with 1/walker/gait belt.   PAIN MANAGMENT: medicate prior to dressing change. PRN x1  DIET: regular with thin liquids, aspiration precautions, no straws.   BOWEL/BLADDER: Incontinent of bladder at times, purewick used intermittently (when in bed at night). Up to BSC during day. BM 4/12  ABNL LAB: from 4/11/22: Hgb 8.9 creat 1.01, albumin 1.9, procal 1.08, trop 98.  DRAIN/DEVICES: PIV removed, MD aware.  TELEMETRY RHYTHM: N/A  SKIN: Wound cares on RLE and devon area done per WOC order. Miconazole powder available. WOUND DRESSING DONE BY MD yesterday..  TESTS/PROCEDURES: NA  D/C DAY/GOALS/PLACE: TBD, pending  TCU vs LTACH placement, SW following. Hoping for pt to be off O2 first.   OTHER IMPORTANT INFO: Contact isolation for Hx of MRSA. PT/WOC following. CMS intact. Uses CPAP overnight which is new for pt this stay.

## 2022-04-13 NOTE — PROGRESS NOTES
[facility]    Medicine Progress Note - Hospitalist Service    Date of Admission:  3/21/2022    Assessment & Plan        Elizabeth Kelley is a 76 year old female with PMHx MS, chronic BLE lymphedema with venous insufficiency and chronic R leg cellulitis, GERD, hyperlipidemia, and depression who underwent previously scheduled excisional debridement of RLE cicumferential venous hypertensive ulceration and application of wound vac on 3/21/22.   Hospitalist service consulted 3/23/22 for AMS ultimately determined to have sepsis 2/2 CAP with respiratory failure. She had been progressing well, completing antibiotic regimen and weaned to room air with discharge planning in progress until 4/3 with worsening systemic evidence of infection and hypoxia, resumed on broad-spectrum antibiotics and imaging suggestive of bilateral pulmonary infiltrates. Also with development of afib with RVR requiring IV medications, transition to IMC. Cardiac workup unrevealing, has since improved from cardiopulmonary perspective and transferred to Med/Surg.     Sepsis secondary to CAP vs aspiration pneumonia, recurrent  Acute hypoxic respiratory failure secondary to above, improving  Hx of MRSA  * 3/23 Tmax 101.5, tachycardic in the 110s, hypoxic to 86% on room air. WBC 18.3, procal 0.17. CXR with patchy airspace opacities in the left mid and lower lung, suspicious for pneumonia. Treated with Zosyn for CAP, repeat CXR 3/28 with new right upper lobe infiltrate and basilar infiltrate lateral views. Received diuretics for volume overload, transitioned to Augmentin on 3/30 with plans for 14 day course to treat LE cellulitis.  * 4/3 with fever to 102, uptrending WBC with mildly elevated procalcitonin. C/O SOB, noted to be hypoxic. COVID-19, RSV, Influenza PCR neg. UA neg. CXR on 4/2 neg. LE wound valuated by vascular surgery, no evidence of infection. CTA Chest neg for PE, with bilateral pulmonary infiltrates L>R, most  consistent with infectious etiology. Blood cultures x2 repeated. Resumed IV Zosyn, Vanco. ID consulted.   * 4/5 Video swallow negative, cleared for reg diet with instructions to sit upright, speech continuing to follow  * 4/6 Weaned to 1L O2. I/O appear innacurate as net value -18L, according to daily weights is +15 lbs with reported weight gain of 2lbs overnight.  * Weight 284--279  - Zosyn transitioned to Unasyn on 4/5  - ID consulted, appreciate input, plan for Abx until 4/8  - Follow blood cultures, NGTD  - Sputum culture as able  - RCAT consulted, encourage pulmonary toilet with IS and acapella.  - Duonebs q4 hrs while awake, PRN albuterol nebs.  - Monitor fluid status.  - IV diuresis with lasix 20mg BID, pending weight trend today will continue one additional day v resume PO lasix  - Strict I&O, daily weights  - Resumed PTA Aldactone  - Robitussin, tessalon perles prn.  - SLP following     Afib with RVR, new diagnosis  NSTEMI  Overnight 4/4 with RRT for development of afib with RVR, rates 130s-150s. In setting of sepsis. EKG with marked ST depression and TWI in lateral leads. Received IV diltiazem bolus with plan to transition to drip, developed three 3-second pauses with conversion to SR. Initiated on heparin drip. Remains in NSR this AM.  * Troponin 17--98  * Lexiscan stress negative for inducible ischemia  * TTE with LVEF 60-65%, no WMAs, no valvular dz, mildly elevated PA pressure  - Cardiology consulted, ischemic workup neg, no indication for AC at this time given setting of sepsis. If afib recurs, would rec AC and tx with amiodarone rather than diltiazem     Encephalopathy, multifactorial, resolved  Altered mentation noted by nursing during hosptialization. Multifactorial in the setting of fever, narcotic pain medications, suspected underlying BOLIVAR, possible dehydration. Received dose of narcan, but did not significantly change pts mentation immediately, though seemed to improve within the hour after IVF  bolus was near completion. Mental status continues to improved.  - Continue expectant management.  - Bipap prn.  - Suspect underlying BOLIVAR, urged her to get evaluated as outpatient.  - Resumed PTA Wellbutrin, Requip as per the patient's request but will closely monitor mentation; low threshold to hold these meds if she becomes confused/lethargic.  - Discontinued PTA lyrica, increased gabapentin by 100mg      Acute blood loss anemia  Chronic normocytic anemia  Baseline 10-11. Down to 8.3 post-procedure. No active bleeding, some bleeding from wound bed with dressing changes. Could be dilutional as checked while receiving IVF bolus   *Received 1u PRBCs 3/25, 3/27, 4/6 for Hgb < 7  *Hgb 9.2  - Additional 1u PRBCs for Hgb < 7  - Discontinued scheduled Celebrex  - Increase PPI proph to BID  - Iron studies suggestive of iron deficiency, started on oral iron supplement.     Hematuria, resolved  Dysuria - resolved  Noted in AM on 3/30/22. Likely 2/2 trauma/irritation from garrison catheter. Has been on empiric Abx, UA 4/1 neg. Resolved as of 3/31. Garrison removed 4/1.  Urinalysis unremarkable on 4/9.     Chronic BLE lymphedema with venous insufficiency and chronic R leg cellulitis   S/P excisional debridement of RLE cicumferential venous hypertensive ulceration and application of wound vac (3/21/22)  S/P intraoperative wound examination, wound debridement, and application of myriad 2 layer graft on 3/28  - Defer routine post-operative cares including wound cares to Dr. Bullock.  - Judicious pain control.  - Gabapentin 400 mg po TID, PTA Lyrica discontinued 4/5 as above  - Continue PTA furosemide, aldactone and potassium supplement     Severe Obesity, BMI 50.81  - Follow up with PCP.      MS  Ambulatory at baseline, though some weakness in lower extremities and intermittent upper extremity weakness.     GERD  - Continue PTA Protonix.     Hyperlipidemia  - Continue PTA simvastatin.     Depression  Resumed PTA Celexa, Wellbutrin and  Lyrica on 3/26/22. Had been held for a few days given AMS, resumed when mentation back to baseline. As above, weaned lyrica off and solely on gabapentin 4/5.     RLS  - Continue PTA Requip.    4/13- stable, awaiting placement.  Continue present care.     Diet: Combination Diet Regular Diet; Thin Liquids (level 0) (No straws)    DVT Prophylaxis: Pneumatic Compression Devices  Amato Catheter: Not present  Central Lines: None  Cardiac Monitoring: None  Code Status: Full Code      Disposition Plan   Expected Discharge: 04/14/2022     Anticipated discharge location: home with family    Delays:     Placement - LTC  Placement - LTAC/ARU            The patient's care was discussed with the Patient.    Timbo Diop MD  Hospitalist Service  [facility]  Securely message with the Vocera Web Console (learn more here)  Text page via [employer] Paging/Directory         Clinically Significant Risk Factors Present on Admission                    ______________________________________________________________________    Interval History   No pain or complaints.     Data reviewed today: I reviewed all medications, new labs and imaging results over the last 24 hours. I personally reviewed no images or EKG's today.    Physical Exam   Vital Signs: Temp: 98.5  F (36.9  C) Temp src: Oral BP: 129/74 Pulse: 83   Resp: 18 SpO2: 91 % O2 Device: Nasal cannula Oxygen Delivery: 2 LPM  Weight: 276 lbs 14.36 oz  Constitutional: awake, alert, cooperative, no apparent distress,  Data   Recent Labs   Lab 04/13/22  0829 04/13/22  0820 04/12/22  1154 04/12/22  0920 04/12/22  0859 04/11/22  1202 04/11/22  0908 04/10/22  0740 04/09/22  0742 04/08/22  0801 04/07/22  1434 04/07/22  1042   WBC  --   --   --   --   --   --   --   --  10.1  --  11.6* 12.2*   HGB  --   --   --   --   --   --  8.9*  --  8.5*  --  8.8* 9.2*   MCV  --   --   --   --   --   --   --   --  89 --  89 88   PLT  --   --   --   --   --   --   --   --  061   --  428 390   NA  --   --   --   --   --   --   --   --   --  136  --   --    POTASSIUM  --   --   --   --   --   --   --   --   --  4.5  --   --    CHLORIDE  --   --   --   --   --   --   --   --   --  102  --   --    CO2  --   --   --   --   --   --   --   --   --  30  --   --    BUN  --   --   --   --   --   --   --   --   --  29  --   --    CR  --  0.90  --  0.94  --   --  1.01   < > 1.05* 1.03  --   --    ANIONGAP  --   --   --   --   --   --   --   --   --  4  --   --    JUNI  --   --   --   --   --   --   --   --   --  9.6  --   --    GLC 99  --  102*  --  84   < >  --    < >  --  100*  --   --     < > = values in this interval not displayed.     No results found for this or any previous visit (from the past 24 hour(s)).  Medications     Reason anticoagulant not prescribed for atrial fibrillation       - MEDICATION INSTRUCTIONS -       - MEDICATION INSTRUCTIONS -         aspirin  81 mg Oral QPM     buPROPion  150 mg Oral QAM     citalopram  40 mg Oral QAM     cyanocobalamin  1,000 mcg Oral Daily     diosmin-hesperidin 450-50  1 capsule Oral BID     ferrous sulfate  325 mg Oral Daily     furosemide  10 mg Oral BID     gabapentin  400 mg Oral TID     ipratropium - albuterol 0.5 mg/2.5 mg/3 mL  3 mL Nebulization Q4H While awake     Aleskandr  1 packet Oral BID     miconazole   Topical BID     multivitamin w/minerals  1 tablet Oral Daily     pantoprazole  40 mg Oral BID AC     polyethylene glycol  17 g Oral Daily     potassium chloride ER  20 mEq Oral QPM     potassium chloride ER  40 mEq Oral QAM     rOPINIRole  0.5 mg Oral BID     rOPINIRole  1 mg Oral At Bedtime     senna-docusate  1 tablet Oral BID     simvastatin  40 mg Oral At Bedtime     sodium chloride (PF)  3 mL Intracatheter Q8H     spironolactone  25 mg Oral Daily     vitamin B complex with vitamin C  1 tablet Oral Daily     Vitamin D3  250 mcg Oral Daily

## 2022-04-13 NOTE — PROGRESS NOTES
2 layer dressing removed right leg, wound washed with hypochlorous acid Vashe, single layer Acticoat applied to wound right lower extremity, rewrapped in 2 layer wrap.  Wound bed is granulation tissue, no cellulitis, interstitial edema and lymphedema improved with utilization of EdemaWear extending to the right mid thigh.  NIRALI OCN will perform 2 layer dressing change on April 15, supplies will be placed at the bedside tomorrow, discussed and reviewed with YAIMA Mcpherson will perform on Friday.  Ara

## 2022-04-13 NOTE — PROVIDER NOTIFICATION
04/13/22 0008   CPAP/BiPAP/Settings   BIPAP/CPAP On Standby On   IPAP/EPAP (cmH2O) 10/5   Rate (breaths/min) 14   Oxygen (%) 30     Pt placed on BIPAP for noc.Pt refused 2300 neb.

## 2022-04-13 NOTE — PLAN OF CARE
Goal Outcome Evaluation:      DATE & TIME:4/13/22 6381-0817   Cognitive Concerns/ Orientation: A&Ox4  BEHAVIOR & AGGRESSION TOOL COLOR: Green    ABNL VS/O2: VSS 02 at 2L sats around 90-91%.  MOBILITY: Up with 1/walker/gait belt. Up in chair with legs reclined and elevated on pillows. Up to BSC x1  PAIN MANAGMENT: Tylenol x1 (medicate prior to dressing change).   DIET: Regular with thin liquids, aspiration precautions, no straws.   BOWEL/BLADDER: Incontinent of bladder at times, purewick used intermittently  (mostly when in bed at night). Up to BSC x1.  ABNL LAB: from 4/11/22: Hgb 8.9 creat 1.01, albumin 1.9, procal 1.08, trop 98. BG 99, 113.  DRAIN/DEVICES: None   TELEMETRY RHYTHM: N/A  SKIN: Wound cares on RLE done by MD today. Michelle area done per WOC order. Miconazole powder applied.  TESTS/PROCEDURES: NA  D/C DAY/GOALS/PLACE: TBD, pending  TCU vs LTACH placement, SW following. Trying to wean off O2 first.   OTHER IMPORTANT INFO: Contact isolation for Hx of MRSA. PT/WOC following. CMS intact. Uses CPAP overnight which is new for pt this stay.

## 2022-04-13 NOTE — PROGRESS NOTES
Pt on 2L NC, SPO2 in the low 90's , breath sounds diminished with Aeration increase post NEB treatment. All NEBs were given as ordered, skin intact under oxygen device, will continue to follow .   4/13/2022  Ana Harrison RT student

## 2022-04-14 ENCOUNTER — APPOINTMENT (OUTPATIENT)
Dept: PHYSICAL THERAPY | Facility: CLINIC | Age: 77
DRG: 264 | End: 2022-04-14
Attending: SURGERY
Payer: COMMERCIAL

## 2022-04-14 LAB
ANION GAP SERPL CALCULATED.3IONS-SCNC: 2 MMOL/L (ref 3–14)
BASOPHILS # BLD AUTO: 0.1 10E3/UL (ref 0–0.2)
BASOPHILS NFR BLD AUTO: 1 %
BUN SERPL-MCNC: 26 MG/DL (ref 7–30)
CALCIUM SERPL-MCNC: 9.6 MG/DL (ref 8.5–10.1)
CHLORIDE BLD-SCNC: 103 MMOL/L (ref 94–109)
CO2 SERPL-SCNC: 31 MMOL/L (ref 20–32)
CREAT SERPL-MCNC: 0.85 MG/DL (ref 0.52–1.04)
EOSINOPHIL # BLD AUTO: 0.5 10E3/UL (ref 0–0.7)
EOSINOPHIL NFR BLD AUTO: 4 %
ERYTHROCYTE [DISTWIDTH] IN BLOOD BY AUTOMATED COUNT: 16.7 % (ref 10–15)
GFR SERPL CREATININE-BSD FRML MDRD: 71 ML/MIN/1.73M2
GLUCOSE BLD-MCNC: 100 MG/DL (ref 70–99)
GLUCOSE BLDC GLUCOMTR-MCNC: 91 MG/DL (ref 70–99)
HCT VFR BLD AUTO: 32.2 % (ref 35–47)
HGB BLD-MCNC: 9.4 G/DL (ref 11.7–15.7)
IMM GRANULOCYTES # BLD: 0.2 10E3/UL
IMM GRANULOCYTES NFR BLD: 2 %
LYMPHOCYTES # BLD AUTO: 1.4 10E3/UL (ref 0.8–5.3)
LYMPHOCYTES NFR BLD AUTO: 13 %
MCH RBC QN AUTO: 26.2 PG (ref 26.5–33)
MCHC RBC AUTO-ENTMCNC: 29.2 G/DL (ref 31.5–36.5)
MCV RBC AUTO: 90 FL (ref 78–100)
MONOCYTES # BLD AUTO: 1.2 10E3/UL (ref 0–1.3)
MONOCYTES NFR BLD AUTO: 12 %
NEUTROPHILS # BLD AUTO: 7.4 10E3/UL (ref 1.6–8.3)
NEUTROPHILS NFR BLD AUTO: 68 %
NRBC # BLD AUTO: 0 10E3/UL
NRBC BLD AUTO-RTO: 0 /100
PLATELET # BLD AUTO: 537 10E3/UL (ref 150–450)
POTASSIUM BLD-SCNC: 3.9 MMOL/L (ref 3.4–5.3)
RBC # BLD AUTO: 3.59 10E6/UL (ref 3.8–5.2)
SODIUM SERPL-SCNC: 136 MMOL/L (ref 133–144)
WBC # BLD AUTO: 10.7 10E3/UL (ref 4–11)

## 2022-04-14 PROCEDURE — 99231 SBSQ HOSP IP/OBS SF/LOW 25: CPT | Performed by: HOSPITALIST

## 2022-04-14 PROCEDURE — 250N000009 HC RX 250: Performed by: SURGERY

## 2022-04-14 PROCEDURE — 250N000013 HC RX MED GY IP 250 OP 250 PS 637: Performed by: PHYSICIAN ASSISTANT

## 2022-04-14 PROCEDURE — 999N000157 HC STATISTIC RCP TIME EA 10 MIN

## 2022-04-14 PROCEDURE — 97530 THERAPEUTIC ACTIVITIES: CPT | Mod: GP

## 2022-04-14 PROCEDURE — G0463 HOSPITAL OUTPT CLINIC VISIT: HCPCS

## 2022-04-14 PROCEDURE — 120N000001 HC R&B MED SURG/OB

## 2022-04-14 PROCEDURE — 97116 GAIT TRAINING THERAPY: CPT | Mod: GP

## 2022-04-14 PROCEDURE — 36415 COLL VENOUS BLD VENIPUNCTURE: CPT | Performed by: HOSPITALIST

## 2022-04-14 PROCEDURE — 94640 AIRWAY INHALATION TREATMENT: CPT | Mod: 76

## 2022-04-14 PROCEDURE — 250N000013 HC RX MED GY IP 250 OP 250 PS 637: Performed by: HOSPITALIST

## 2022-04-14 PROCEDURE — 94660 CPAP INITIATION&MGMT: CPT

## 2022-04-14 PROCEDURE — 250N000013 HC RX MED GY IP 250 OP 250 PS 637: Performed by: SURGERY

## 2022-04-14 PROCEDURE — 80048 BASIC METABOLIC PNL TOTAL CA: CPT | Performed by: HOSPITALIST

## 2022-04-14 PROCEDURE — 85025 COMPLETE CBC W/AUTO DIFF WBC: CPT | Performed by: HOSPITALIST

## 2022-04-14 RX ADMIN — BUPROPION HYDROCHLORIDE 150 MG: 150 TABLET, EXTENDED RELEASE ORAL at 09:34

## 2022-04-14 RX ADMIN — ROPINIROLE HYDROCHLORIDE 1 MG: 0.5 TABLET, FILM COATED ORAL at 21:14

## 2022-04-14 RX ADMIN — B-COMPLEX W/ C & FOLIC ACID TAB 1 TABLET: TAB at 09:34

## 2022-04-14 RX ADMIN — SENNOSIDES AND DOCUSATE SODIUM 1 TABLET: 50; 8.6 TABLET ORAL at 09:34

## 2022-04-14 RX ADMIN — MICONAZOLE NITRATE: 20 POWDER TOPICAL at 09:48

## 2022-04-14 RX ADMIN — GABAPENTIN 400 MG: 300 CAPSULE ORAL at 16:39

## 2022-04-14 RX ADMIN — CITALOPRAM HYDROBROMIDE 40 MG: 20 TABLET, FILM COATED ORAL at 09:33

## 2022-04-14 RX ADMIN — SENNOSIDES AND DOCUSATE SODIUM 1 TABLET: 50; 8.6 TABLET ORAL at 21:14

## 2022-04-14 RX ADMIN — SIMVASTATIN 40 MG: 40 TABLET, FILM COATED ORAL at 21:14

## 2022-04-14 RX ADMIN — SPIRONOLACTONE 25 MG: 25 TABLET ORAL at 09:34

## 2022-04-14 RX ADMIN — Medication 250 MCG: at 09:34

## 2022-04-14 RX ADMIN — ASPIRIN 81 MG CHEWABLE TABLET 81 MG: 81 TABLET CHEWABLE at 21:14

## 2022-04-14 RX ADMIN — FUROSEMIDE 10 MG: 20 TABLET ORAL at 16:39

## 2022-04-14 RX ADMIN — POTASSIUM CHLORIDE 40 MEQ: 1500 TABLET, EXTENDED RELEASE ORAL at 09:34

## 2022-04-14 RX ADMIN — POTASSIUM CHLORIDE 20 MEQ: 1500 TABLET, EXTENDED RELEASE ORAL at 21:14

## 2022-04-14 RX ADMIN — ROPINIROLE HYDROCHLORIDE 0.5 MG: 0.5 TABLET, FILM COATED ORAL at 09:35

## 2022-04-14 RX ADMIN — MULTIPLE VITAMINS W/ MINERALS TAB 1 TABLET: TAB at 09:34

## 2022-04-14 RX ADMIN — GABAPENTIN 400 MG: 300 CAPSULE ORAL at 09:35

## 2022-04-14 RX ADMIN — Medication 1 PACKET: at 21:15

## 2022-04-14 RX ADMIN — IPRATROPIUM BROMIDE AND ALBUTEROL SULFATE 3 ML: .5; 3 SOLUTION RESPIRATORY (INHALATION) at 15:23

## 2022-04-14 RX ADMIN — GABAPENTIN 400 MG: 300 CAPSULE ORAL at 21:14

## 2022-04-14 RX ADMIN — Medication 1 PACKET: at 09:42

## 2022-04-14 RX ADMIN — IPRATROPIUM BROMIDE AND ALBUTEROL SULFATE 3 ML: .5; 3 SOLUTION RESPIRATORY (INHALATION) at 08:11

## 2022-04-14 RX ADMIN — ACETAMINOPHEN 650 MG: 325 TABLET, FILM COATED ORAL at 21:13

## 2022-04-14 RX ADMIN — ACETAMINOPHEN 650 MG: 325 TABLET, FILM COATED ORAL at 09:55

## 2022-04-14 RX ADMIN — CYANOCOBALAMIN TAB 1000 MCG 1000 MCG: 1000 TAB at 09:36

## 2022-04-14 RX ADMIN — ACETAMINOPHEN 650 MG: 325 TABLET, FILM COATED ORAL at 16:42

## 2022-04-14 RX ADMIN — IPRATROPIUM BROMIDE AND ALBUTEROL SULFATE 3 ML: .5; 3 SOLUTION RESPIRATORY (INHALATION) at 21:14

## 2022-04-14 RX ADMIN — MICONAZOLE NITRATE: 20 POWDER TOPICAL at 21:21

## 2022-04-14 RX ADMIN — IPRATROPIUM BROMIDE AND ALBUTEROL SULFATE 3 ML: .5; 3 SOLUTION RESPIRATORY (INHALATION) at 20:18

## 2022-04-14 RX ADMIN — FUROSEMIDE 10 MG: 20 TABLET ORAL at 09:36

## 2022-04-14 RX ADMIN — PANTOPRAZOLE SODIUM 40 MG: 40 TABLET, DELAYED RELEASE ORAL at 16:39

## 2022-04-14 RX ADMIN — GUAIFENESIN 10 ML: 100 SOLUTION ORAL at 21:14

## 2022-04-14 RX ADMIN — Medication 1 CAPSULE: at 09:43

## 2022-04-14 RX ADMIN — FERROUS SULFATE TAB 325 MG (65 MG ELEMENTAL FE) 325 MG: 325 (65 FE) TAB at 09:35

## 2022-04-14 RX ADMIN — PANTOPRAZOLE SODIUM 40 MG: 40 TABLET, DELAYED RELEASE ORAL at 06:35

## 2022-04-14 RX ADMIN — IPRATROPIUM BROMIDE AND ALBUTEROL SULFATE 3 ML: .5; 3 SOLUTION RESPIRATORY (INHALATION) at 12:04

## 2022-04-14 RX ADMIN — ROPINIROLE HYDROCHLORIDE 0.5 MG: 0.5 TABLET, FILM COATED ORAL at 13:19

## 2022-04-14 ASSESSMENT — ACTIVITIES OF DAILY LIVING (ADL)
ADLS_ACUITY_SCORE: 15
ADLS_ACUITY_SCORE: 14
ADLS_ACUITY_SCORE: 12
ADLS_ACUITY_SCORE: 15
ADLS_ACUITY_SCORE: 14
ADLS_ACUITY_SCORE: 15
ADLS_ACUITY_SCORE: 12
ADLS_ACUITY_SCORE: 14
ADLS_ACUITY_SCORE: 15
ADLS_ACUITY_SCORE: 14
ADLS_ACUITY_SCORE: 15
ADLS_ACUITY_SCORE: 15
ADLS_ACUITY_SCORE: 14
ADLS_ACUITY_SCORE: 14
ADLS_ACUITY_SCORE: 15
ADLS_ACUITY_SCORE: 15
ADLS_ACUITY_SCORE: 12
ADLS_ACUITY_SCORE: 14
ADLS_ACUITY_SCORE: 14
ADLS_ACUITY_SCORE: 15

## 2022-04-14 NOTE — PLAN OF CARE
Goal Outcome Evaluation:    Plan of Care Reviewed With: patient     DATE & TIME:4/14/22 8866-8889  Cognitive Concerns/ Orientation: A&Ox4  BEHAVIOR & AGGRESSION TOOL COLOR: Green    ABNL VS/O2: /49, low grade fever 99 in evening, other VSS, O2 93% on 1L NC, encouraged IS frequently. No signs of respiratory distress.   MOBILITY: Up with 1/walker/gait belt.   PAIN MANAGMENT: Tylenol x2 for RLE pain.   DIET: Regular with thin liquids, aspiration precautions, no straws.   BOWEL/BLADDER: Incontinent of bladder at times, purewick used intermittently (mostly when in bed at night). Continent of bowel, BM x1.   ABNL LAB: Hgb 9.4 (8.9 on 4/11)    DRAIN/DEVICES: no IV access, purewick intermittently.   TELEMETRY RHYTHM: N/A  SKIN: RLE wound done by Dr. Bullock (vascular surgery) yesterday 4/13, WOC RN to change it tomorrow, dressings CDI. Michelle area redness with tenderness, cares done per WOC order. Scattered bruises, +2-3 BLE edema.   TESTS/PROCEDURES: NA  D/C DAY/GOALS/PLACE: TBD, pending  TCU vs LTACH placement, SW following.  OTHER IMPORTANT INFO: Maintained contact isolation for Hx of MRSA. PT/WOC following. CPAP at HS.

## 2022-04-14 NOTE — PROGRESS NOTES
"SPIRITUAL HEALTH SERVICES Progress Note  [facility]  68    Visited with PT per verbal request communicated on  referral line. Provided compassionate listening, reflective conversation, devotional reading and prayer.    PT (\"Elizabeth\") states that she is frustrated with the pain she has experienced over the past 18 months. Elizabeth indicates that her direct relationship with God \"is the only thing that's kept me going\" during that time. She also states that she has \"a lot of people in my corner\" and relies in particular on a close friend when \"God doesn't give me the answer I wanted to hear.\" Elizabeth stated that she has no resentment towards God or others because of her illness and that suffering which all people experience is a mystery.    Elizabeth named Psalm 23 as among her favorite pieces of scripture. The  recited this psalm for her along with a prayer for her recovery.    PT is aware of the availability of  services by request. Follow-up with regular visits while PT remains in hospital.     Ajay Henriquez  Associate   Pager     "

## 2022-04-14 NOTE — TREATMENT PLAN
Long-term care facility evaluations in progress, anticipate that she would be discharged to the hospital to a long-term care facility for approximately 2 to 3 weeks.  For wound care right lower extremity, we recommend hypochlorous acid Vashe moist dressing changes twice a day to the right lower extremity, right ankle leg elevation above the level of the hip consistently when in bed or chair, can ambulate anticipate physical therapy, Occupational Therapy.  She will have follow-up in the wound healing [place] on May 4, 2022 at 8:20 AM, contact phone number [phone].  We anticipate that when she has adequate granulation tissue throughout the entire wound bed of the right lower extremity, we would proceed with split-thickness skin grafting with subsequent anticipated 1 week hospitalization for negative pressure wound therapy application over the split skin graft.  She subsequently be seen in the wound clinic on a weekly basis for 2 layer compression wrap for 4 to 6 weeks until the wound is completely closed.  This is a significantly larger wound with approximately 500 cm , right lower extremity and chronic anticoagulation    Ara  [phone]

## 2022-04-14 NOTE — PROGRESS NOTES
[facility]  Medicine Progress Note - Hospitalist Service    Date of Admission:  3/21/2022    Assessment & Plan        Elizabeth Kelley is a 76 year old female with PMHx MS, chronic BLE lymphedema with venous insufficiency and chronic R leg cellulitis, GERD, hyperlipidemia, and depression who underwent previously scheduled excisional debridement of RLE cicumferential venous hypertensive ulceration and application of wound vac on 3/21/22.   Hospitalist service consulted 3/23/22 for AMS ultimately determined to have sepsis 2/2 CAP with respiratory failure. She had been progressing well, completing antibiotic regimen and weaned to room air with discharge planning in progress until 4/3 with worsening systemic evidence of infection and hypoxia, resumed on broad-spectrum antibiotics and imaging suggestive of bilateral pulmonary infiltrates. Also with development of afib with RVR requiring IV medications, transition to IMC. Cardiac workup unrevealing, has since improved from cardiopulmonary perspective and transferred to Med/Surg.     Sepsis secondary to CAP vs aspiration pneumonia, recurrent  Acute hypoxic respiratory failure secondary to above, improving  Hx of MRSA  * 3/23 Tmax 101.5, tachycardic in the 110s, hypoxic to 86% on room air. WBC 18.3, procal 0.17. CXR with patchy airspace opacities in the left mid and lower lung, suspicious for pneumonia. Treated with Zosyn for CAP, repeat CXR 3/28 with new right upper lobe infiltrate and basilar infiltrate lateral views. Received diuretics for volume overload, transitioned to Augmentin on 3/30 with plans for 14 day course to treat LE cellulitis.  * 4/3 with fever to 102, uptrending WBC with mildly elevated procalcitonin. C/O SOB, noted to be hypoxic. COVID-19, RSV, Influenza PCR neg. UA neg. CXR on 4/2 neg. LE wound valuated by vascular surgery, no evidence of infection. CTA Chest neg for PE, with bilateral pulmonary infiltrates L>R, most consistent  with infectious etiology. Blood cultures x2 repeated. Resumed IV Zosyn, Vanco. ID consulted.   * 4/5 Video swallow negative, cleared for reg diet with instructions to sit upright, speech continuing to follow  * 4/6 Weaned to 1L O2. I/O appear innacurate as net value -18L, according to daily weights is +15 lbs with reported weight gain of 2lbs overnight.  * Weight 284--279  - Zosyn transitioned to Unasyn on 4/5  - ID consulted, appreciate input, plan for Abx until 4/8  - Follow blood cultures, NGTD  - Sputum culture as able  - RCAT consulted, encourage pulmonary toilet with IS and acapella.  - Duonebs q4 hrs while awake, PRN albuterol nebs.  - Monitor fluid status.  - IV diuresis with lasix 20mg BID, pending weight trend today will continue one additional day v resume PO lasix  - Strict I&O, daily weights  - Resumed PTA Aldactone  - Robitussin, tessalon perles prn.  - SLP following     Afib with RVR, new diagnosis  NSTEMI  Overnight 4/4 with RRT for development of afib with RVR, rates 130s-150s. In setting of sepsis. EKG with marked ST depression and TWI in lateral leads. Received IV diltiazem bolus with plan to transition to drip, developed three 3-second pauses with conversion to SR. Initiated on heparin drip. Remains in NSR this AM.  * Troponin 17--98  * Lexiscan stress negative for inducible ischemia  * TTE with LVEF 60-65%, no WMAs, no valvular dz, mildly elevated PA pressure  - Cardiology consulted, ischemic workup neg, no indication for AC at this time given setting of sepsis. If afib recurs, would rec AC and tx with amiodarone rather than diltiazem     Encephalopathy, multifactorial, resolved  Altered mentation noted by nursing during hosptialization. Multifactorial in the setting of fever, narcotic pain medications, suspected underlying BOLIVAR, possible dehydration. Received dose of narcan, but did not significantly change pts mentation immediately, though seemed to improve within the hour after IVF bolus was  near completion. Mental status continues to improved.  - Continue expectant management.  - Bipap prn.  - Suspect underlying BOLIVAR, urged her to get evaluated as outpatient.  - Resumed PTA Wellbutrin, Requip as per the patient's request but will closely monitor mentation; low threshold to hold these meds if she becomes confused/lethargic.  - Discontinued PTA lyrica, increased gabapentin by 100mg      Acute blood loss anemia  Chronic normocytic anemia  Baseline 10-11. Down to 8.3 post-procedure. No active bleeding, some bleeding from wound bed with dressing changes. Could be dilutional as checked while receiving IVF bolus   *Received 1u PRBCs 3/25, 3/27, 4/6 for Hgb < 7  *Hgb 9.2  - Additional 1u PRBCs for Hgb < 7  - Discontinued scheduled Celebrex  - Increase PPI proph to BID  - Iron studies suggestive of iron deficiency, started on oral iron supplement.     Hematuria, resolved  Dysuria - resolved  Noted in AM on 3/30/22. Likely 2/2 trauma/irritation from garrison catheter. Has been on empiric Abx, UA 4/1 neg. Resolved as of 3/31. Garrison removed 4/1.  Urinalysis unremarkable on 4/9.     Chronic BLE lymphedema with venous insufficiency and chronic R leg cellulitis   S/P excisional debridement of RLE cicumferential venous hypertensive ulceration and application of wound vac (3/21/22)  S/P intraoperative wound examination, wound debridement, and application of myriad 2 layer graft on 3/28  - Defer routine post-operative cares including wound cares to Dr. Bullock.  - Judicious pain control.  - Gabapentin 400 mg po TID, PTA Lyrica discontinued 4/5 as above  - Continue PTA furosemide, aldactone and potassium supplement.  - on diuretic but large amount of potassium supplement as well as aldactone, closely monitor K. Labs ordered.      Severe Obesity, BMI 50.81  - Follow up with PCP.      MS  Ambulatory at baseline, though some weakness in lower extremities and intermittent upper extremity weakness.     GERD  - Continue PTA  Protonix.     Hyperlipidemia  - Continue PTA simvastatin.     Depression  Resumed PTA Celexa, Wellbutrin and Lyrica on 3/26/22. Had been held for a few days given AMS, resumed when mentation back to baseline. As above, weaned lyrica off and solely on gabapentin 4/5.     RLS  - Continue PTA Requip.    4/14- stable, awaiting placement.  Continue present care.     Diet: Combination Diet Regular Diet; Thin Liquids (level 0) (No straws)    DVT Prophylaxis: Pneumatic Compression Devices  Amato Catheter: Not present  Central Lines: None  Cardiac Monitoring: None  Code Status: Full Code      Disposition Plan   Expected Discharge: stable for discharge pending placement.    Anticipated discharge location: home with family    Delays:     Placement - LTC  Placement - LTAC/ARU            The patient's care was discussed with the Patient.    Elias Mai MD  Hospitalist Service  [facility]  Securely message with the Vocera Web Console (learn more here)  Text page via [employer] Paging/Directory         Clinically Significant Risk Factors Present on Admission                    ______________________________________________________________________    Interval History   Care resumed today, discussed with RN, and evaluated patient. Up in recliner, states she slept good. No acute issues. Stable 1.5-2 LPM NC O2. Using IS/acapella.    Data reviewed today: I reviewed all medications, new labs and imaging results over the last 24 hours. I personally reviewed no images or EKG's today.    Physical Exam   Vital Signs: Temp: 98.8  F (37.1  C) Temp src: Oral BP: (!) 140/49 Pulse: 78   Resp: 16 SpO2: 92 % O2 Device: Nasal cannula Oxygen Delivery: 1.5 LPM  Weight: 283 lbs 15.24 oz     Constitutional: awake, alert, cooperative, very pleasant, no apparent distress  HEENT: PERRLA EOMI  Lungs: dim but CTA, on 2LPM O2  CVS s1s2 regular. No tachycardia  MSK: legs are edematous, with wraps, wounds not examined    Data    Recent Labs   Lab 04/13/22  1226 04/13/22  0829 04/13/22  0820 04/12/22  1154 04/12/22  0920 04/11/22  1202 04/11/22  0908 04/10/22  0740 04/09/22  0742 04/08/22  0801 04/08/22  0801 04/07/22  1434 04/07/22  1042   WBC  --   --   --   --   --   --   --   --  10.1  --   --  11.6* 12.2*   HGB  --   --   --   --   --   --  8.9*  --  8.5*  --   --  8.8* 9.2*   MCV  --   --   --   --   --   --   --   --  89  --   --  89 88   PLT  --   --   --   --   --   --   --   --  443  --   --  428 390   NA  --   --   --   --   --   --   --   --   --   --  136  --   --    POTASSIUM  --   --   --   --   --   --   --   --   --   --  4.5  --   --    CHLORIDE  --   --   --   --   --   --   --   --   --   --  102  --   --    CO2  --   --   --   --   --   --   --   --   --   --  30  --   --    BUN  --   --   --   --   --   --   --   --   --   --  29  --   --    CR  --   --  0.90  --  0.94  --  1.01   < > 1.05*   < > 1.03  --   --    ANIONGAP  --   --   --   --   --   --   --   --   --   --  4  --   --    JUNI  --   --   --   --   --   --   --   --   --   --  9.6  --   --    * 99  --  102*  --    < >  --    < >  --   --  100*  --   --     < > = values in this interval not displayed.     No results found for this or any previous visit (from the past 24 hour(s)).  Medications     Reason anticoagulant not prescribed for atrial fibrillation       - MEDICATION INSTRUCTIONS -       - MEDICATION INSTRUCTIONS -         aspirin  81 mg Oral QPM     buPROPion  150 mg Oral QAM     citalopram  40 mg Oral QAM     cyanocobalamin  1,000 mcg Oral Daily     diosmin-hesperidin 450-50  1 capsule Oral BID     ferrous sulfate  325 mg Oral Daily     furosemide  10 mg Oral BID     gabapentin  400 mg Oral TID     ipratropium - albuterol 0.5 mg/2.5 mg/3 mL  3 mL Nebulization Q4H While awake     Aleksandr  1 packet Oral BID     miconazole   Topical BID     multivitamin w/minerals  1 tablet Oral Daily     pantoprazole  40 mg Oral BID AC     polyethylene glycol  17  g Oral Daily     potassium chloride ER  20 mEq Oral QPM     potassium chloride ER  40 mEq Oral QAM     rOPINIRole  0.5 mg Oral BID     rOPINIRole  1 mg Oral At Bedtime     senna-docusate  1 tablet Oral BID     simvastatin  40 mg Oral At Bedtime     sodium chloride (PF)  3 mL Intracatheter Q8H     spironolactone  25 mg Oral Daily     vitamin B complex with vitamin C  1 tablet Oral Daily     Vitamin D3  250 mcg Oral Daily

## 2022-04-14 NOTE — PROGRESS NOTES
[facility]  WOC Nurse Inpatient Wound Assessment     Reason for consultation: Evaluate and treat perineal area     Assessment  Perineal area, inner thighs, gluteal cleft: IAD (incontinence-associated dermatitis) and MASD (moisture-associated skin damage), related to urinary and fecal incontinence and chafing/sweating in skin folds.  Worsening this week, needs more frequent/meticulous cares.  No obvious pressure injury at this time.     (RLE managed by Vascular Surgery and Wound clinic, though [facility] will be assisting with 2 layer wrap tomorrow Fri 4/15)    Treatment Plan  Perineal cares (groin, inner thighs, gluteal cleft): every shift and prn incontinence:  1.  Cleanse all areas with generous amount Rivas perineal lotion and soft dry wipes.  Spray the Rivas directly on the skin.  Avoid use of the blue bag wipes in devon area.   2.  Apply antifungal powder (ask MD to order) to skin folds, inner thighs, gluteal cleft.  Spread the powder evenly and thinly with a dry cloth.    3.  Apply Critic-aid barrier paste along the gluteal cleft/ inner buttocks and perianal areas only (avoid anterior groin/labia).  4.  Keep briefs off when in bed.  Have pt lie with legs apart as much as possible; consider using small fan to blow on area prn.     *PIP measures.  Reposition pt every 1-2 hrs.  Keep HOB as low as tolerated.   *Continue PureWick as needed when resting/sleeping but check/adjust it at least every 2 hrs/ with every turn.     If above protocol not effective, might consider use of Cavilon Advanced to inner thigh breakdown in future.  For now will continue the Rivas lotion to help condition and protect the skin, and the AF powder to prevent further rash development, to dry out the overly moist areas, and to minimize chafing.      Orders Reviewed and Updated  Recommended provider order: Antifungal powder  WOC Nurse follow-up plan: weekly  Nursing to notify the Provider(s) and re-consult the WOC Nurse if  wound(s) deteriorates or new skin concern.    Patient History  According to provider note(s):  Elizabeth Kelley is a 76 year old female with PMHx MS, chronic BLE lymphedema with venous insufficiency and chronic R leg cellulitis, GERD, hyperlipidemia, and depression who underwent previously scheduled excisional debridement of RLE cicumferential venous hypertensive ulceration and application of wound vac on 3/21/22. Hospitalist service consulted 3/23/22 for AMS ultimately determined to have sepsis 2/2 CAP with respiratory failure. She had been progressing well, completing antibiotic regimen and weaned to room air with worsening systemic evidence of infection and hypoxia on 4/3, resumed on broad-spectrum antibiotics and imaging suggestive of bilateral pulmonary infiltrates. Also with new diagnosis of afib with RVR requiring IV medications, transition to CCU.     Objective Data  Containment of urine/stool: Incontinence Protocol, pull-on briefs, purewick, toilet as able    Active Diet Order:  Orders Placed This Encounter      Combination Diet Regular Diet; Thin Liquids (level 0) (No straws)    Output:   I/O last 3 completed shifts:  In: 240 [P.O.:240]  Out: 300 [Urine:300]    Risk Assessment:   Sensory Perception: 3-->slightly limited  Moisture: 3-->occasionally moist  Activity: 3-->walks occasionally  Mobility: 3-->slightly limited  Nutrition: 3-->adequate  Friction and Shear: 2-->potential problem  Ajith Score: 17                          Labs:   Recent Labs   Lab 04/14/22  0807   HGB 9.4*   WBC 10.7       Physical Exam  Skin inspection: focused perineal areas    Wound Location:  Gluteal cleft  Date of last photo:  4-14-22 4-7-22 gluteal cleft      Wound History: pt incontinent of urine and stool.  Frequent dribbling especially when coughs.  Using purewick at night, trying to get up to toilet during the day.  Pt obese and edematous with deep skin folds, trapping moisture.  Able to turn well with assist x 1-2.     Measurements (length x width x depth, in cm):   approx previously approx 5 x 0.3 x 0.1cm, now appears lightly intact  Wound Base: pink newly resurfaced tissue   Tunneling: N/A  Undermining: N/A  Palpation of the wound bed: normal   Periwound skin: deep pink erythema/rash to perineal area  Color: pink  Temperature: normal   Drainage: none  Odor: none  Pain: mild    Wound Location:  Inner thighs  Date of last photo:  4-14-22 4-7-22       Wound History: pt incontinent of urine and stool.  Frequent dribbling especially when coughs.  Using purewick at night, trying to get up to toilet during the day.  Pt obese and edematous with deep skin folds, trapping moisture.  Able to turn well with assist x 1-2.    Measurements (length x width x depth, in cm): scattered areas, approx 10 x 10cm each side, no depth  Wound Base: pink lightly denuded peeling tissue, deeper pink this week with more defined borders and more satellite lesions  Palpation of the wound bed: normal   Periwound skin: pink, peely  Color: pink  Temperature: normal   Drainage: scant serous weeping  Odor: none  Pain: mild    Interventions  Current support surface: Standard  Atmos Air mattress  Current off-loading measures: Pillows  Visual inspection of wound(s) completed  Wound Care: done per plan of care  Supplies: reviewed  Education provided: importance of repositioning, plan of care, wound progress, Moisture management and Off-loading pressure  Discussed plan of care with Patient, Family and Nurse    Vida Badillo RN, CWOCN

## 2022-04-14 NOTE — PLAN OF CARE
Goal Outcome Evaluation:      DATE & TIME:4/13/22-4/14/22 [id number]  Cognitive Concerns/ Orientation: A&Ox4  BEHAVIOR & AGGRESSION TOOL COLOR: Green    ABNL VS/O2: VSS, on 2LPM O2, SAT's in the 90's.  MOBILITY: Up with 1/walker/gait belt.   PAIN MANAGMENT: Dilaudid x1 for lower back and leg pain  DIET: Regular with thin liquids, aspiration precautions, no straws.   BOWEL/BLADDER: Incontinent of bladder at times, purewick used intermittently  (mostly when in bed at night).   ABNL LAB: from 4/11/22: Hgb 8.9 creat 1.01, albumin 1.9, procal 1.08, trop 98. BG 99, 113.  DRAIN/DEVICES: Purewick  TELEMETRY RHYTHM: N/A  SKIN: Wound on RLE, cares on RLE done by MD yesterday. Michelle area red, cares done per WOC order. Miconazole powder applied. Scattered bruises, TESTS/PROCEDURES: NA  D/C DAY/GOALS/PLACE: TBD, pending  TCU vs LTACH placement, SW following. Trying to wean off O2 first.   OTHER IMPORTANT INFO: Contact isolation for Hx of MRSA. PT/WOC following.Uses CPAP overnight which is new for pt this stay.

## 2022-04-15 LAB
ANION GAP SERPL CALCULATED.3IONS-SCNC: 3 MMOL/L (ref 3–14)
BUN SERPL-MCNC: 25 MG/DL (ref 7–30)
CALCIUM SERPL-MCNC: 9.3 MG/DL (ref 8.5–10.1)
CHLORIDE BLD-SCNC: 103 MMOL/L (ref 94–109)
CO2 SERPL-SCNC: 31 MMOL/L (ref 20–32)
CREAT SERPL-MCNC: 0.91 MG/DL (ref 0.52–1.04)
GFR SERPL CREATININE-BSD FRML MDRD: 66 ML/MIN/1.73M2
GLUCOSE BLD-MCNC: 124 MG/DL (ref 70–99)
POTASSIUM BLD-SCNC: 4 MMOL/L (ref 3.4–5.3)
SODIUM SERPL-SCNC: 137 MMOL/L (ref 133–144)

## 2022-04-15 PROCEDURE — 36415 COLL VENOUS BLD VENIPUNCTURE: CPT | Performed by: HOSPITALIST

## 2022-04-15 PROCEDURE — 94640 AIRWAY INHALATION TREATMENT: CPT | Mod: 76

## 2022-04-15 PROCEDURE — 250N000009 HC RX 250: Performed by: SURGERY

## 2022-04-15 PROCEDURE — 999N000157 HC STATISTIC RCP TIME EA 10 MIN

## 2022-04-15 PROCEDURE — 120N000001 HC R&B MED SURG/OB

## 2022-04-15 PROCEDURE — 99233 SBSQ HOSP IP/OBS HIGH 50: CPT | Performed by: INTERNAL MEDICINE

## 2022-04-15 PROCEDURE — 250N000013 HC RX MED GY IP 250 OP 250 PS 637: Performed by: SURGERY

## 2022-04-15 PROCEDURE — 250N000013 HC RX MED GY IP 250 OP 250 PS 637: Performed by: HOSPITALIST

## 2022-04-15 PROCEDURE — 250N000013 HC RX MED GY IP 250 OP 250 PS 637: Performed by: PHYSICIAN ASSISTANT

## 2022-04-15 PROCEDURE — 80048 BASIC METABOLIC PNL TOTAL CA: CPT | Performed by: HOSPITALIST

## 2022-04-15 PROCEDURE — 94640 AIRWAY INHALATION TREATMENT: CPT

## 2022-04-15 PROCEDURE — 29581 APPL MULTLAYER CMPRN SYS LEG: CPT

## 2022-04-15 PROCEDURE — 250N000013 HC RX MED GY IP 250 OP 250 PS 637: Performed by: INTERNAL MEDICINE

## 2022-04-15 RX ORDER — FUROSEMIDE 40 MG
40 TABLET ORAL DAILY
Status: DISCONTINUED | OUTPATIENT
Start: 2022-04-15 | End: 2022-04-29 | Stop reason: HOSPADM

## 2022-04-15 RX ADMIN — GABAPENTIN 400 MG: 300 CAPSULE ORAL at 10:11

## 2022-04-15 RX ADMIN — FERROUS SULFATE TAB 325 MG (65 MG ELEMENTAL FE) 325 MG: 325 (65 FE) TAB at 10:11

## 2022-04-15 RX ADMIN — ASPIRIN 81 MG CHEWABLE TABLET 81 MG: 81 TABLET CHEWABLE at 20:29

## 2022-04-15 RX ADMIN — IPRATROPIUM BROMIDE AND ALBUTEROL SULFATE 3 ML: .5; 3 SOLUTION RESPIRATORY (INHALATION) at 07:56

## 2022-04-15 RX ADMIN — MICONAZOLE NITRATE: 20 POWDER TOPICAL at 21:34

## 2022-04-15 RX ADMIN — ROPINIROLE HYDROCHLORIDE 1 MG: 0.5 TABLET, FILM COATED ORAL at 21:33

## 2022-04-15 RX ADMIN — BUPROPION HYDROCHLORIDE 150 MG: 150 TABLET, EXTENDED RELEASE ORAL at 10:10

## 2022-04-15 RX ADMIN — ROPINIROLE HYDROCHLORIDE 0.5 MG: 0.5 TABLET, FILM COATED ORAL at 10:10

## 2022-04-15 RX ADMIN — Medication 1 CAPSULE: at 21:33

## 2022-04-15 RX ADMIN — SENNOSIDES AND DOCUSATE SODIUM 1 TABLET: 50; 8.6 TABLET ORAL at 10:11

## 2022-04-15 RX ADMIN — SPIRONOLACTONE 25 MG: 25 TABLET ORAL at 10:10

## 2022-04-15 RX ADMIN — B-COMPLEX W/ C & FOLIC ACID TAB 1 TABLET: TAB at 10:10

## 2022-04-15 RX ADMIN — GABAPENTIN 400 MG: 300 CAPSULE ORAL at 15:52

## 2022-04-15 RX ADMIN — ROPINIROLE HYDROCHLORIDE 0.5 MG: 0.5 TABLET, FILM COATED ORAL at 12:46

## 2022-04-15 RX ADMIN — Medication 1 PACKET: at 21:34

## 2022-04-15 RX ADMIN — FUROSEMIDE 40 MG: 40 TABLET ORAL at 10:38

## 2022-04-15 RX ADMIN — GABAPENTIN 400 MG: 300 CAPSULE ORAL at 21:33

## 2022-04-15 RX ADMIN — IPRATROPIUM BROMIDE AND ALBUTEROL SULFATE 3 ML: .5; 3 SOLUTION RESPIRATORY (INHALATION) at 15:33

## 2022-04-15 RX ADMIN — MULTIPLE VITAMINS W/ MINERALS TAB 1 TABLET: TAB at 10:10

## 2022-04-15 RX ADMIN — IPRATROPIUM BROMIDE AND ALBUTEROL SULFATE 3 ML: .5; 3 SOLUTION RESPIRATORY (INHALATION) at 11:38

## 2022-04-15 RX ADMIN — Medication 250 MCG: at 10:10

## 2022-04-15 RX ADMIN — PANTOPRAZOLE SODIUM 40 MG: 40 TABLET, DELAYED RELEASE ORAL at 15:52

## 2022-04-15 RX ADMIN — CITALOPRAM HYDROBROMIDE 40 MG: 20 TABLET, FILM COATED ORAL at 10:09

## 2022-04-15 RX ADMIN — SENNOSIDES AND DOCUSATE SODIUM 1 TABLET: 50; 8.6 TABLET ORAL at 21:34

## 2022-04-15 RX ADMIN — POTASSIUM CHLORIDE 20 MEQ: 1500 TABLET, EXTENDED RELEASE ORAL at 20:30

## 2022-04-15 RX ADMIN — Medication 1 CAPSULE: at 10:38

## 2022-04-15 RX ADMIN — Medication 1 PACKET: at 10:18

## 2022-04-15 RX ADMIN — ACETAMINOPHEN 650 MG: 325 TABLET, FILM COATED ORAL at 13:21

## 2022-04-15 RX ADMIN — MICONAZOLE NITRATE: 20 POWDER TOPICAL at 10:18

## 2022-04-15 RX ADMIN — PANTOPRAZOLE SODIUM 40 MG: 40 TABLET, DELAYED RELEASE ORAL at 06:53

## 2022-04-15 RX ADMIN — POTASSIUM CHLORIDE 40 MEQ: 1500 TABLET, EXTENDED RELEASE ORAL at 10:10

## 2022-04-15 RX ADMIN — SIMVASTATIN 40 MG: 40 TABLET, FILM COATED ORAL at 21:33

## 2022-04-15 RX ADMIN — CYANOCOBALAMIN TAB 1000 MCG 1000 MCG: 1000 TAB at 10:11

## 2022-04-15 RX ADMIN — HYDROMORPHONE HYDROCHLORIDE 4 MG: 2 TABLET ORAL at 11:26

## 2022-04-15 RX ADMIN — ACETAMINOPHEN 650 MG: 325 TABLET, FILM COATED ORAL at 20:29

## 2022-04-15 ASSESSMENT — ACTIVITIES OF DAILY LIVING (ADL)
ADLS_ACUITY_SCORE: 15
ADLS_ACUITY_SCORE: 12
ADLS_ACUITY_SCORE: 15
ADLS_ACUITY_SCORE: 12
ADLS_ACUITY_SCORE: 14
ADLS_ACUITY_SCORE: 14
ADLS_ACUITY_SCORE: 15
ADLS_ACUITY_SCORE: 12
ADLS_ACUITY_SCORE: 14
ADLS_ACUITY_SCORE: 8
ADLS_ACUITY_SCORE: 14
ADLS_ACUITY_SCORE: 14
ADLS_ACUITY_SCORE: 8
ADLS_ACUITY_SCORE: 14
ADLS_ACUITY_SCORE: 12
ADLS_ACUITY_SCORE: 14
ADLS_ACUITY_SCORE: 12
ADLS_ACUITY_SCORE: 14

## 2022-04-15 NOTE — CONSULTS
[facility]  WO Nurse Inpatient Wound Assessment     Reason for consultation: change 2-layer wrap to RLE    (WOC also following perineal IAD, see separate notes, last seen 4/14)    Assessment  RLE: chronic wounds followed by Dr. Bullock. Wounds have been improving in general, awaiting sufficient improvement for eventual skin graft.  2-layer wraps being utilized currently while in hospital.  Wrap exchanged today without issue, pt tolerated well after pain meds and with soaking dressing to ease removal.      Treatment Plan  RLE: 2-layer wrap can stay in place until at least next Mon 4/18 if remains clean, dry, intact, and no discomfort.  If it needs to be removed for any reason, resume Vashe-moist Kerlix dressings BID.  Defer to Dr. Bullock team next week for further cares/ wraps while in hospital.  See Dr. Bullock's note 4/14 for recs for discharge.     Orders written and Reviewed in Dr. Bullock notes  Recommended provider order: None, at this time  WO Nurse follow-up plan: prn  Nursing to notify the Provider(s) and re-consult the [facility] Nurse if wound(s) deteriorates or new skin concern.    Patient History  According to provider note(s):  Elizabeth Kelley is a 76 year old female with PMHx MS, chronic BLE lymphedema with venous insufficiency and chronic R leg cellulitis, GERD, hyperlipidemia, and depression who underwent previously scheduled excisional debridement of RLE cicumferential venous hypertensive ulceration and application of wound vac on 3/21/22.   Hospitalist service consulted 3/23/22 for AMS ultimately determined to have sepsis 2/2 CAP with respiratory failure. She had been progressing well, completing antibiotic regimen and weaned to room air with discharge planning in progress until 4/3 with worsening systemic evidence of infection and hypoxia, resumed on broad-spectrum antibiotics and imaging suggestive of bilateral pulmonary infiltrates. Also with development of afib with RVR requiring  IV medications, transition to IMC. Cardiac workup unrevealing, has since improved from cardiopulmonary perspective and transferred to Med/Surg.    Objective Data  Containment of urine/stool: Incontinence Protocol prn, up to toilet as able, purewick at night    Active Diet Order:  Orders Placed This Encounter      Combination Diet Regular Diet; Thin Liquids (level 0) (No straws)    Output:   I/O last 3 completed shifts:  In: 1440 [P.O.:1440]  Out: 900 [Urine:900]    Risk Assessment:   Sensory Perception: 3-->slightly limited  Moisture: 3-->occasionally moist  Activity: 3-->walks occasionally  Mobility: 3-->slightly limited  Nutrition: 3-->adequate  Friction and Shear: 2-->potential problem  Ajith Score: 17                          Labs: Recent Labs   Lab 04/14/22  0807   HGB 9.4*   WBC 10.7       Physical Exam  Skin inspection: focused RLE    Wound Location:  RLE  Date of last photo:  4-15-22 medial      4-15-22 lateral      4-15-22 posterior      Wound History: see Dr. Bullock notes; chronic wounds  Measurements (length x width x depth, in cm):  Roughly 20cm x nearly circumferential, irregular   Wound Base: pink moist tissue with scattered thin yellow film  Tunneling: N/A  Undermining: N/A  Palpation of the wound bed: normal   Periwound skin: scaly, peely, chinedu  Color: pink  Temperature: normal   Drainage: moderate  Description of drainage: serosanguinous  Odor: mild to old dressing/drainage only  Pain: moderate    Interventions  Current support surface: Standard  Atmos Air mattress  Current off-loading measures: Pillows  Visual inspection of wound(s) completed  Wound Care: done per plan of care  Supplies: reviewed  Education provided: plan of care, wound progress and Moisture management  Discussed plan of care with Patient and Nurse    Vida Badillo RN, CWOCN

## 2022-04-15 NOTE — PROGRESS NOTES
Nebs given as ordered. LS are diminished t/o and pt is on 2L. Will continue to follow.  Brice Ulloa

## 2022-04-15 NOTE — PLAN OF CARE
Goal Outcome Evaluation:      DATE & TIME:4/14/22 [id number]  Cognitive Concerns/ Orientation: A&Ox4  BEHAVIOR & AGGRESSION TOOL COLOR: Green    ABNL VS/O2: VSS on 1L NC, cpap at night, encouraged IS/acapella frequently.  MOBILITY: Assist x1 GBW, encourage mobility as tolerated  PAIN MANAGMENT: Tylenol x1 for RLE pain.   DIET: Regular with thin liquids, aspiration precautions, no straws.   BOWEL/BLADDER: Incontinent of bladder at times, purewick used overnight.  ABNL LAB: Hgb 9.4 (8.9 on 4/11)    DRAIN/DEVICES: no IV access, purewick intermittently.   TELEMETRY RHYTHM: N/A  SKIN: RLE wound done by Dr. Bullock (vascular surgery) yesterday 4/14, WOC RN to change it today, dressings CDI. Michelle area redness with tenderness, cares done per WOC order. Scattered bruises, +2-3 BLE edema, pulses weak BLE and difficult to palpate r/t dressings.   TESTS/PROCEDURES: NA  D/C DAY/GOALS/PLACE: TBD, pending  TCU vs LTACH placement, SW following.  OTHER IMPORTANT INFO: Maintained contact isolation for Hx of MRSA. PT/WOC following. Scheduled nebs. Robitussen given x1 for congested cough post nebulizer tx.

## 2022-04-15 NOTE — PROGRESS NOTES
[facility]  Medicine Progress Note - Hospitalist Service    Date of Admission:  3/21/2022    Assessment & Plan        Elizabeth Kelley is a 76 year old female with PMHx MS, chronic BLE lymphedema with venous insufficiency and chronic R leg cellulitis, GERD, hyperlipidemia, and depression who underwent previously scheduled excisional debridement of RLE cicumferential venous hypertensive ulceration and application of wound vac on 3/21/22.   Hospitalist service consulted 3/23/22 for AMS ultimately determined to have sepsis 2/2 CAP with respiratory failure. She had been progressing well, completing antibiotic regimen and weaned to room air with discharge planning in progress until 4/3 with worsening systemic evidence of infection and hypoxia, resumed on broad-spectrum antibiotics and imaging suggestive of bilateral pulmonary infiltrates. Also with development of afib with RVR requiring IV medications, transition to IMC. Cardiac workup unrevealing, has since improved from cardiopulmonary perspective and transferred to Med/Surg.     Sepsis secondary to CAP vs aspiration pneumonia, recurrent  Acute hypoxic respiratory failure secondary to above, improving  Hx of MRSA  Acute diastolic CHF exacerbation   * 3/23 Tmax 101.5, tachycardic in the 110s, hypoxic to 86% on room air. WBC 18.3, procal 0.17. CXR with patchy airspace opacities in the left mid and lower lung, suspicious for pneumonia. Treated with Zosyn for CAP, repeat CXR 3/28 with new right upper lobe infiltrate and basilar infiltrate lateral views. Received diuretics for volume overload, transitioned to Augmentin on 3/30 with plans for 14 day course to treat LE cellulitis.  * 4/3 with fever to 102, uptrending WBC with mildly elevated procalcitonin. C/O SOB, noted to be hypoxic. COVID-19, RSV, Influenza PCR neg. UA neg. CXR on 4/2 neg. LE wound valuated by vascular surgery, no evidence of infection. CTA Chest neg for PE, with bilateral  pulmonary infiltrates L>R, most consistent with infectious etiology. Blood cultures x2 repeated. Resumed IV Zosyn, Vanco. ID consulted.   * 4/5 Video swallow negative, cleared for reg diet with instructions to sit upright, speech continuing to follow  * 4/6 Weaned to 1L O2. I/O appear innacurate as net value -18L, according to daily weights is +15 lbs with reported weight gain of 2lbs overnight.  * Weight 284--279  - Zosyn transitioned to Unasyn on 4/5  - ID consulted, appreciate input, plan for Abx until 4/8  - Follow blood cultures, NGTD  - Sputum culture as able  - RCAT consulted, encourage pulmonary toilet with IS and acapella.  - Duonebs q4 hrs while awake, PRN albuterol nebs.  - Monitor fluid status.  - IV diuresis with lasix 20mg BID, switched to lasix 40mg po daily today   - Strict I&O, daily weights  - Resumed PTA Aldactone  - Robitussin, tessalon perles prn.  - SLP following     Afib with RVR, new diagnosis  NSTEMI  Overnight 4/4 with RRT for development of afib with RVR, rates 130s-150s. In setting of sepsis. EKG with marked ST depression and TWI in lateral leads. Received IV diltiazem bolus with plan to transition to drip, developed three 3-second pauses with conversion to SR. Initiated on heparin drip. Remains in NSR this AM.  * Troponin 17--98  * Lexiscan stress negative for inducible ischemia  * TTE with LVEF 60-65%, no WMAs, no valvular dz, mildly elevated PA pressure  - Cardiology consulted, ischemic workup neg, no indication for AC at this time given setting of sepsis. If afib recurs, would rec AC and tx with amiodarone rather than diltiazem     Encephalopathy, multifactorial, resolved  Altered mentation noted by nursing during hosptialization. Multifactorial in the setting of fever, narcotic pain medications, suspected underlying BOLIVAR, possible dehydration. Received dose of narcan, but did not significantly change pts mentation immediately, though seemed to improve within the hour after IVF bolus  was near completion. Mental status continues to improved.  - Continue expectant management.  - Bipap prn.  - Suspect underlying BOLIVAR, urged her to get evaluated as outpatient.  - Resumed PTA Wellbutrin, Requip as per the patient's request but will closely monitor mentation; low threshold to hold these meds if she becomes confused/lethargic.  - Discontinued PTA lyrica, increased gabapentin by 100mg      Acute blood loss anemia  Chronic normocytic anemia  Baseline 10-11. Down to 8.3 post-procedure. No active bleeding, some bleeding from wound bed with dressing changes. Could be dilutional as checked while receiving IVF bolus   *Received 1u PRBCs 3/25, 3/27, 4/6 for Hgb < 7  *Hgb 9.2  - Additional 1u PRBCs for Hgb < 7  - Discontinued scheduled Celebrex  - Increase PPI proph to BID  - Iron studies suggestive of iron deficiency, started on oral iron supplement.     Hematuria, resolved  Dysuria - resolved  Noted in AM on 3/30/22. Likely 2/2 trauma/irritation from garrison catheter. Has been on empiric Abx, UA 4/1 neg. Resolved as of 3/31. Garrison removed 4/1.  Urinalysis unremarkable on 4/9.     Chronic BLE lymphedema with venous insufficiency and chronic R leg cellulitis   S/P excisional debridement of RLE cicumferential venous hypertensive ulceration and application of wound vac (3/21/22)  S/P intraoperative wound examination, wound debridement, and application of myriad 2 layer graft on 3/28  - Defer routine post-operative cares including wound cares to Dr. Bullock.  - Judicious pain control.  - Gabapentin 400 mg po TID, PTA Lyrica discontinued 4/5 as above  - Continue aldactone and potassium supplement.  Lasix dose at 40mg PO daily   - on diuretic but large amount of potassium supplement as well as aldactone, closely monitor K. Labs ordered.      Severe Obesity, BMI 50.81  - Follow up with PCP.      MS  Ambulatory at baseline, though some weakness in lower extremities and intermittent upper extremity weakness.     GERD  -  Continue PTA Protonix.     Hyperlipidemia  - Continue PTA simvastatin.     Depression  Resumed PTA Celexa, Wellbutrin and Lyrica on 3/26/22. Had been held for a few days given AMS, resumed when mentation back to baseline. As above, weaned lyrica off and solely on gabapentin 4/5.     RLS  - Continue PTA Requip.    4/14- stable, awaiting placement.  Continue present care.     Diet: Combination Diet Regular Diet; Thin Liquids (level 0) (No straws)    DVT Prophylaxis: Pneumatic Compression Devices  Amato Catheter: Not present  Central Lines: None  Cardiac Monitoring: None  Code Status: Full Code      Disposition Plan   Expected Discharge: stable for discharge pending placement.      Anticipated discharge location: home with family    Delays:     Placement - LTC  Placement - LTAC/ARU            The patient's care was discussed with the Patient, bedside RN and her daughter who is a bedside today .    Denise Valle MD  Hospitalist Service  [facility]  Securely message with the Vocera Web Console (learn more here)  Text page via [employer] Paging/Directory         Clinically Significant Risk Factors Present on Admission                    ______________________________________________________________________    Interval History   Care resumed today, discussed with RN, and evaluated patient. Up in recliner, states she slept good. No acute issues. Stable 1.5-2 LPM NC O2. Using IS/acapella.    Data reviewed today: I reviewed all medications, new labs and imaging results over the last 24 hours. I personally reviewed no images or EKG's today.    Physical Exam   Vital Signs: Temp: 98.5  F (36.9  C) Temp src: Oral BP: 135/60 Pulse: 76   Resp: 20 SpO2: 95 % O2 Device: Nasal cannula Oxygen Delivery: 2 LPM  Weight: 283 lbs 15.24 oz     Constitutional: awake, alert, cooperative, very pleasant, no apparent distress  HEENT: PERRLA EOMI  Lungs: bilateral crackles heard on auscultation   CVS s1s2 regular. No  tachycardia  MSK: legs are edematous, with wraps, wounds not examined    Data   Recent Labs   Lab 04/14/22  0853 04/14/22  0807 04/13/22  1226 04/13/22  0829 04/13/22  0820 04/12/22  1154 04/12/22  0920 04/11/22  1202 04/11/22  0908 04/10/22  0740 04/09/22  0742   WBC  --  10.7  --   --   --   --   --   --   --   --  10.1   HGB  --  9.4*  --   --   --   --   --   --  8.9*  --  8.5*   MCV  --  90  --   --   --   --   --   --   --   --  89   PLT  --  537*  --   --   --   --   --   --   --   --  443   NA  --  136  --   --   --   --   --   --   --   --   --    POTASSIUM  --  3.9  --   --   --   --   --   --   --   --   --    CHLORIDE  --  103  --   --   --   --   --   --   --   --   --    CO2  --  31  --   --   --   --   --   --   --   --   --    BUN  --  26  --   --   --   --   --   --   --   --   --    CR  --  0.85  --   --  0.90  --  0.94  --  1.01   < > 1.05*   ANIONGAP  --  2*  --   --   --   --   --   --   --   --   --    JUNI  --  9.6  --   --   --   --   --   --   --   --   --    GLC 91 100* 113*   < >  --    < >  --    < >  --    < >  --     < > = values in this interval not displayed.     No results found for this or any previous visit (from the past 24 hour(s)).  Medications     Reason anticoagulant not prescribed for atrial fibrillation       - MEDICATION INSTRUCTIONS -       - MEDICATION INSTRUCTIONS -         aspirin  81 mg Oral QPM     buPROPion  150 mg Oral QAM     citalopram  40 mg Oral QAM     cyanocobalamin  1,000 mcg Oral Daily     diosmin-hesperidin 450-50  1 capsule Oral BID     ferrous sulfate  325 mg Oral Daily     furosemide  40 mg Oral Daily     gabapentin  400 mg Oral TID     ipratropium - albuterol 0.5 mg/2.5 mg/3 mL  3 mL Nebulization Q4H While awake     Aleksandr  1 packet Oral BID     miconazole   Topical BID     multivitamin w/minerals  1 tablet Oral Daily     pantoprazole  40 mg Oral BID AC     polyethylene glycol  17 g Oral Daily     potassium chloride ER  20 mEq Oral QPM     potassium  chloride ER  40 mEq Oral QAM     rOPINIRole  0.5 mg Oral BID     rOPINIRole  1 mg Oral At Bedtime     senna-docusate  1 tablet Oral BID     simvastatin  40 mg Oral At Bedtime     sodium chloride (PF)  3 mL Intracatheter Q8H     spironolactone  25 mg Oral Daily     vitamin B complex with vitamin C  1 tablet Oral Daily     Vitamin D3  250 mcg Oral Daily

## 2022-04-16 LAB
ANION GAP SERPL CALCULATED.3IONS-SCNC: 3 MMOL/L (ref 3–14)
BUN SERPL-MCNC: 22 MG/DL (ref 7–30)
CALCIUM SERPL-MCNC: 9.8 MG/DL (ref 8.5–10.1)
CHLORIDE BLD-SCNC: 101 MMOL/L (ref 94–109)
CO2 SERPL-SCNC: 32 MMOL/L (ref 20–32)
CREAT SERPL-MCNC: 0.89 MG/DL (ref 0.52–1.04)
GFR SERPL CREATININE-BSD FRML MDRD: 68 ML/MIN/1.73M2
GLUCOSE BLD-MCNC: 99 MG/DL (ref 70–99)
POTASSIUM BLD-SCNC: 4.1 MMOL/L (ref 3.4–5.3)
SODIUM SERPL-SCNC: 136 MMOL/L (ref 133–144)

## 2022-04-16 PROCEDURE — 80048 BASIC METABOLIC PNL TOTAL CA: CPT | Performed by: INTERNAL MEDICINE

## 2022-04-16 PROCEDURE — 250N000013 HC RX MED GY IP 250 OP 250 PS 637: Performed by: SURGERY

## 2022-04-16 PROCEDURE — 999N000157 HC STATISTIC RCP TIME EA 10 MIN

## 2022-04-16 PROCEDURE — 120N000001 HC R&B MED SURG/OB

## 2022-04-16 PROCEDURE — 94640 AIRWAY INHALATION TREATMENT: CPT | Mod: 76

## 2022-04-16 PROCEDURE — 250N000013 HC RX MED GY IP 250 OP 250 PS 637: Performed by: PHYSICIAN ASSISTANT

## 2022-04-16 PROCEDURE — 250N000013 HC RX MED GY IP 250 OP 250 PS 637: Performed by: HOSPITALIST

## 2022-04-16 PROCEDURE — 250N000013 HC RX MED GY IP 250 OP 250 PS 637: Performed by: INTERNAL MEDICINE

## 2022-04-16 PROCEDURE — 36415 COLL VENOUS BLD VENIPUNCTURE: CPT | Performed by: INTERNAL MEDICINE

## 2022-04-16 PROCEDURE — 99233 SBSQ HOSP IP/OBS HIGH 50: CPT | Performed by: INTERNAL MEDICINE

## 2022-04-16 PROCEDURE — 250N000009 HC RX 250: Performed by: SURGERY

## 2022-04-16 PROCEDURE — 94660 CPAP INITIATION&MGMT: CPT

## 2022-04-16 RX ADMIN — SENNOSIDES AND DOCUSATE SODIUM 1 TABLET: 50; 8.6 TABLET ORAL at 09:47

## 2022-04-16 RX ADMIN — FERROUS SULFATE TAB 325 MG (65 MG ELEMENTAL FE) 325 MG: 325 (65 FE) TAB at 09:47

## 2022-04-16 RX ADMIN — MICONAZOLE NITRATE: 20 POWDER TOPICAL at 09:48

## 2022-04-16 RX ADMIN — POTASSIUM CHLORIDE 40 MEQ: 1500 TABLET, EXTENDED RELEASE ORAL at 09:46

## 2022-04-16 RX ADMIN — GUAIFENESIN 10 ML: 100 SOLUTION ORAL at 11:35

## 2022-04-16 RX ADMIN — GABAPENTIN 400 MG: 300 CAPSULE ORAL at 17:07

## 2022-04-16 RX ADMIN — Medication 1 CAPSULE: at 09:45

## 2022-04-16 RX ADMIN — BUPROPION HYDROCHLORIDE 150 MG: 150 TABLET, EXTENDED RELEASE ORAL at 09:46

## 2022-04-16 RX ADMIN — POTASSIUM CHLORIDE 20 MEQ: 1500 TABLET, EXTENDED RELEASE ORAL at 20:15

## 2022-04-16 RX ADMIN — PANTOPRAZOLE SODIUM 40 MG: 40 TABLET, DELAYED RELEASE ORAL at 09:46

## 2022-04-16 RX ADMIN — SIMVASTATIN 40 MG: 40 TABLET, FILM COATED ORAL at 22:54

## 2022-04-16 RX ADMIN — Medication 1 PACKET: at 20:16

## 2022-04-16 RX ADMIN — PANTOPRAZOLE SODIUM 40 MG: 40 TABLET, DELAYED RELEASE ORAL at 17:07

## 2022-04-16 RX ADMIN — CYANOCOBALAMIN TAB 1000 MCG 1000 MCG: 1000 TAB at 09:47

## 2022-04-16 RX ADMIN — MICONAZOLE NITRATE: 20 POWDER TOPICAL at 20:16

## 2022-04-16 RX ADMIN — B-COMPLEX W/ C & FOLIC ACID TAB 1 TABLET: TAB at 09:47

## 2022-04-16 RX ADMIN — ROPINIROLE HYDROCHLORIDE 1 MG: 0.5 TABLET, FILM COATED ORAL at 22:55

## 2022-04-16 RX ADMIN — ASPIRIN 81 MG CHEWABLE TABLET 81 MG: 81 TABLET CHEWABLE at 22:54

## 2022-04-16 RX ADMIN — GABAPENTIN 400 MG: 300 CAPSULE ORAL at 09:47

## 2022-04-16 RX ADMIN — MULTIPLE VITAMINS W/ MINERALS TAB 1 TABLET: TAB at 09:46

## 2022-04-16 RX ADMIN — IPRATROPIUM BROMIDE AND ALBUTEROL SULFATE 3 ML: .5; 3 SOLUTION RESPIRATORY (INHALATION) at 11:55

## 2022-04-16 RX ADMIN — ROPINIROLE HYDROCHLORIDE 0.5 MG: 0.5 TABLET, FILM COATED ORAL at 09:47

## 2022-04-16 RX ADMIN — ASPIRIN 81 MG CHEWABLE TABLET 81 MG: 81 TABLET CHEWABLE at 20:15

## 2022-04-16 RX ADMIN — Medication 250 MCG: at 09:46

## 2022-04-16 RX ADMIN — Medication 1 PACKET: at 09:49

## 2022-04-16 RX ADMIN — SPIRONOLACTONE 25 MG: 25 TABLET ORAL at 09:46

## 2022-04-16 RX ADMIN — ROPINIROLE HYDROCHLORIDE 0.5 MG: 0.5 TABLET, FILM COATED ORAL at 12:58

## 2022-04-16 RX ADMIN — CITALOPRAM HYDROBROMIDE 40 MG: 20 TABLET, FILM COATED ORAL at 09:46

## 2022-04-16 RX ADMIN — GABAPENTIN 400 MG: 300 CAPSULE ORAL at 22:54

## 2022-04-16 RX ADMIN — FUROSEMIDE 40 MG: 40 TABLET ORAL at 09:47

## 2022-04-16 RX ADMIN — IPRATROPIUM BROMIDE AND ALBUTEROL SULFATE 3 ML: .5; 3 SOLUTION RESPIRATORY (INHALATION) at 23:28

## 2022-04-16 RX ADMIN — ACETAMINOPHEN 650 MG: 325 TABLET, FILM COATED ORAL at 11:35

## 2022-04-16 RX ADMIN — ACETAMINOPHEN 650 MG: 325 TABLET, FILM COATED ORAL at 17:12

## 2022-04-16 RX ADMIN — IPRATROPIUM BROMIDE AND ALBUTEROL SULFATE 3 ML: .5; 3 SOLUTION RESPIRATORY (INHALATION) at 19:59

## 2022-04-16 RX ADMIN — HYDROMORPHONE HYDROCHLORIDE 4 MG: 2 TABLET ORAL at 22:59

## 2022-04-16 ASSESSMENT — ACTIVITIES OF DAILY LIVING (ADL)
ADLS_ACUITY_SCORE: 11
ADLS_ACUITY_SCORE: 8
ADLS_ACUITY_SCORE: 11
ADLS_ACUITY_SCORE: 11
ADLS_ACUITY_SCORE: 10
ADLS_ACUITY_SCORE: 11
ADLS_ACUITY_SCORE: 8
ADLS_ACUITY_SCORE: 11
ADLS_ACUITY_SCORE: 10
ADLS_ACUITY_SCORE: 12
ADLS_ACUITY_SCORE: 8
ADLS_ACUITY_SCORE: 8
ADLS_ACUITY_SCORE: 12
ADLS_ACUITY_SCORE: 10
ADLS_ACUITY_SCORE: 11
ADLS_ACUITY_SCORE: 10
ADLS_ACUITY_SCORE: 12

## 2022-04-16 NOTE — PLAN OF CARE
Goal Outcome Evaluation:    Plan of Care Reviewed With: patient           DATE & TIME:4/15/22 7345-3269  Cognitive Concerns/ Orientation: A&Ox4  BEHAVIOR & AGGRESSION TOOL COLOR: Green    ABNL VS/O2: VSS on 1L NC, CPAP at night,   MOBILITY: Assist x1 GBW - refuses GB at times  PAIN MANAGMENT: Denies pain at this time  DIET: Regular with thin liquids, aspiration precautions, no straws.   BOWEL/BLADDER: continent  ABNL LAB: Hgb 9.4  DRAIN/DEVICES: no IV access  TELEMETRY RHYTHM: N/A  SKIN: RLE wound done by Dr. Bullock (vascular surgery) yesterday 4/15, WOC RN changed , dressings CDI. Michelle area redness with tenderness, Scattered bruises, +2-3 BLE edema, pulses weak BLE and difficult to palpate d/t dressings.   TESTS/PROCEDURES: NA  D/C DAY/GOALS/PLACE: TBD, pending  TCU vs LTACH placement, SW following.  OTHER IMPORTANT INFO: Maintained contact isolation for Hx of MRSA. PT/WOC following. Scheduled nebs.

## 2022-04-16 NOTE — PROGRESS NOTES
[facility]  Medicine Progress Note - Hospitalist Service    Date of Admission:  3/21/2022    Assessment & Plan        Elizabeth Kelley is a 76 year old female with PMHx MS, chronic BLE lymphedema with venous insufficiency and chronic R leg cellulitis, GERD, hyperlipidemia, and depression who underwent previously scheduled excisional debridement of RLE cicumferential venous hypertensive ulceration and application of wound vac on 3/21/22.   Hospitalist service consulted 3/23/22 for AMS ultimately determined to have sepsis 2/2 CAP with respiratory failure. She had been progressing well, completing antibiotic regimen and weaned to room air with discharge planning in progress until 4/3 with worsening systemic evidence of infection and hypoxia, resumed on broad-spectrum antibiotics and imaging suggestive of bilateral pulmonary infiltrates. Also with development of afib with RVR requiring IV medications, transition to IMC. Cardiac workup unrevealing, has since improved from cardiopulmonary perspective and transferred to Med/Surg.     Sepsis secondary to Pneumonia vs aspiration pneumonia, recurrent  Acute hypoxic respiratory failure secondary to above: now resolved.   Hx of MRSA  Acute on chronic diastolic CHF exacerbation   * on 3/23 Tmax 101.5, tachycardic in the 110s, hypoxic to 86% on room air. WBC 18.3, procal 0.17. CXR with patchy airspace opacities in the left mid and lower lung, suspicious for pneumonia. Treated with Zosyn for CAP, repeat CXR 3/28 with new right upper lobe infiltrate and basilar infiltrate lateral views. Received diuretics for volume overload, transitioned to Augmentin on 3/30 with plans for 14 day course to treat LE cellulitis.  * 4/3 with fever to 102, uptrending WBC with mildly elevated procalcitonin. C/O SOB, noted to be hypoxic. COVID-19, RSV, Influenza PCR neg. UA neg. CXR on 4/2 neg. LE wound valuated by vascular surgery, no evidence of infection. CTA Chest neg for  PE, with bilateral pulmonary infiltrates L>R, most consistent with infectious etiology. Blood cultures x2 repeated remain negative, Resumed IV Zosyn, Vanco. ID consulted.   * 4/5 Video swallow negative, cleared for reg diet with instructions to sit upright, speech continuing to follow  * 4/6 Weaned to 1L O2. I/O   * Weight 284--279  - Zosyn transitioned to Unasyn on 4/5  - ID consulted, appreciate input, plan for Abx until 4/8  - Follow blood cultures, NGTD  - RCAT consulted, encourage pulmonary toilet with IS and acapella.  - Duonebs q4 hrs while awake, PRN albuterol nebs.  - Monitor fluid status.  - IV diuresis with lasix 20mg BID, switched to lasix 40mg po daily 4/15  - Strict I&O, daily weights  - Resumed PTA Aldactone  - Robitussin, tessalon perles prn.  - SLP following    Overall stable and now wean off from oxygen to room air at this time, I think likely had recurrent aspiration Pneumonia given her moderate Hiatal hernia, no trouble with swallowing, recommend to remain upright at least 2 hr after eating to prevent aspirations. Continue Protonix., Continue aggressive IS and flutter. Agree with oral Lasix at this time.      Afib with RVR, new diagnosis  NSTEMI  Overnight 4/4 with RRT for development of afib with RVR, rates 130s-150s. In setting of sepsis. EKG with marked ST depression and TWI in lateral leads. Received IV diltiazem bolus with plan to transition to drip, developed three 3-second pauses with conversion to SR. Initiated on heparin drip. Remains in NSR this AM.  * Troponin 17--98  * Lexiscan stress negative for inducible ischemia  * TTE with LVEF 60-65%, no WMAs, no valvular dz, mildly elevated PA pressure  - Cardiology consulted, ischemic workup neg, no indication for anticoagulation at this time given setting of sepsis. If afib recurs, would rec AC and tx with amiodarone rather than diltiazem     Encephalopathy, multifactorial, resolved  Altered mentation noted by nursing during hosptialization.  Multifactorial in the setting of fever, narcotic pain medications, suspected underlying BOLIVAR, possible dehydration. Received dose of narcan, but did not significantly change pts mentation immediately, though seemed to improve within the hour after IVF bolus was near completion. Mental status continues to improved.  - Continue expectant management.  - Bipap prn.  - Suspect underlying BOLIVAR, urged her to get evaluated as outpatient.  - Resumed PTA Wellbutrin, Requip as per the patient's request but will closely monitor mentation; low threshold to hold these meds if she becomes confused/lethargic.  - Discontinued PTA lyrica, increased gabapentin by 100mg      Acute blood loss anemia  Chronic normocytic anemia  Baseline 10-11. Down to 8.3 post-procedure. No active bleeding, some bleeding from wound bed with dressing changes. Could be dilutional as checked while receiving IVF bolus   *Received 1u PRBCs 3/25, 3/27, 4/6 for Hgb < 7  *Hgb 9.2  - Additional 1u PRBCs for Hgb < 7  - Discontinued scheduled Celebrex  - Increase PPI proph to BID  - Iron studies suggestive of iron deficiency, started on oral iron supplement.     Hematuria, resolved  Dysuria - resolved  Noted in AM on 3/30/22. Likely 2/2 trauma/irritation from garrison catheter. Has been on empiric Abx, UA 4/1 neg. Resolved as of 3/31. Garrison removed 4/1.  Urinalysis unremarkable on 4/9.      Chronic BLE lymphedema with venous insufficiency and chronic R leg cellulitis   S/P excisional debridement of RLE  ulceration and application of wound vac (3/21/22)  S/P intraoperative wound examination, wound debridement, and application of myriad 2 layer graft on 3/28  - Defer routine post-operative cares including wound cares to Dr. Bullock.  - Judicious pain control.  - Gabapentin 400 mg po TID, PTA Lyrica discontinued 4/5 as above  - Continue aldactone and potassium supplement.  Lasix dose at 40mg PO daily   - on diuretic but large amount of potassium supplement as well as  aldactone, closely monitor K. Labs ordered.      Severe Obesity, BMI 50.81  - Follow up with PCP.      MS  Ambulatory at baseline, though some weakness in lower extremities and intermittent upper extremity weakness.     GERD  - Continue PTA Protonix.     Hyperlipidemia  - Continue PTA simvastatin.     Depression  Resumed PTA Celexa, Wellbutrin and Lyrica on 3/26/22. Had been held for a few days given AMS, resumed when mentation back to baseline. As above, weaned lyrica off and solely on gabapentin 4/5.     RLS  - Continue PTA Requip.    Patient continue to be stable at this time and currently on room air.   SW/CM consulted and following, planning for LTAC at this time.   IS, flutter, ambulation.        Diet: Combination Diet Regular Diet; Thin Liquids (level 0) (No straws)    DVT Prophylaxis: Pneumatic Compression Devices  Amato Catheter: Not present  Central Lines: None  Cardiac Monitoring: None  Code Status: Full Code      Disposition Plan   Expected Discharge: stable for discharge pending placement.      Anticipated discharge location: home with family    Delays:     Placement - LTC  Placement - LTAC/ARU            The patient's care was discussed with the Patient, bedside RN and her daughter who is a bedside today .    Vic Stanley MD  Hospitalist Service  [facility]  Securely message with the Vocera Web Console (learn more here)  Text page via [employer] Paging/Directory         Clinically Significant Risk Factors Present on Admission                    ______________________________________________________________________    Interval History   Patient seen and evaluated in her room, sitting on recliner and having her breakfast, feeling well, denies any chest pain, nausea, vomiting, fever, chills, headache or dizziness, SOB is now improved.     No other significant event overnight.     Data reviewed today: I reviewed all medications, new labs and imaging results over the last 24 hours. I  personally reviewed no images or EKG's today.    Physical Exam   Vital Signs: Temp: 98.7  F (37.1  C) Temp src: Oral BP: (!) 158/55 Pulse: 84   Resp: 18 SpO2: 92 % O2 Device: None (Room air) Oxygen Delivery: 2 LPM  Weight: 283 lbs 15.24 oz     Constitutional: awake, alert, cooperative, very pleasant, no apparent distress  HEENT: PERRLA EOMI  Lungs: bilateral crackles heard on auscultation   CVS s1s2 regular. No tachycardia  MSK: legs are edematous, with wraps, wounds not examined as covered with wraps     Data   Recent Labs   Lab 04/16/22  0812 04/15/22  1439 04/14/22  0853 04/14/22  0807 04/11/22  1202 04/11/22  0908   WBC  --   --   --  10.7  --   --    HGB  --   --   --  9.4*  --  8.9*   MCV  --   --   --  90  --   --    PLT  --   --   --  537*  --   --     137  --  136  --   --    POTASSIUM 4.1 4.0  --  3.9  --   --    CHLORIDE 101 103  --  103  --   --    CO2 32 31  --  31  --   --    BUN 22 25  --  26  --   --    CR 0.89 0.91  --  0.85   < > 1.01   ANIONGAP 3 3  --  2*  --   --    JUNI 9.8 9.3  --  9.6  --   --    GLC 99 124* 91 100*   < >  --     < > = values in this interval not displayed.     No results found for this or any previous visit (from the past 24 hour(s)).  Medications     Reason anticoagulant not prescribed for atrial fibrillation       - MEDICATION INSTRUCTIONS -       - MEDICATION INSTRUCTIONS -         aspirin  81 mg Oral QPM     buPROPion  150 mg Oral QAM     citalopram  40 mg Oral QAM     cyanocobalamin  1,000 mcg Oral Daily     diosmin-hesperidin 450-50  1 capsule Oral BID     ferrous sulfate  325 mg Oral Daily     furosemide  40 mg Oral Daily     gabapentin  400 mg Oral TID     ipratropium - albuterol 0.5 mg/2.5 mg/3 mL  3 mL Nebulization Q4H While awake     Aleksandr  1 packet Oral BID     miconazole   Topical BID     multivitamin w/minerals  1 tablet Oral Daily     pantoprazole  40 mg Oral BID AC     polyethylene glycol  17 g Oral Daily     potassium chloride ER  20 mEq Oral QPM      potassium chloride ER  40 mEq Oral QAM     rOPINIRole  0.5 mg Oral BID     rOPINIRole  1 mg Oral At Bedtime     senna-docusate  1 tablet Oral BID     simvastatin  40 mg Oral At Bedtime     sodium chloride (PF)  3 mL Intracatheter Q8H     spironolactone  25 mg Oral Daily     vitamin B complex with vitamin C  1 tablet Oral Daily     Vitamin D3  250 mcg Oral Daily

## 2022-04-16 NOTE — PLAN OF CARE
Goal Outcome Evaluation:    Plan of Care Reviewed With: patient     DATE & TIME:4/16/22 7668-5144  Cognitive Concerns/ Orientation: A&Ox4  BEHAVIOR & AGGRESSION TOOL COLOR: Green    ABNL VS/O2: VSS on RA, this afternoon pt O2 was in mid 80s and stated SOB 1L O2 applied  MOBILITY: Assist x1 GBW - refuses GB at times  PAIN MANAGMENT: Pain in R leg PRN tylenol x1; PRN robitussin x1  DIET: Regular with thin liquids, aspiration precautions, no straws.   BOWEL/BLADDER: continent  ABNL LAB: HGB 9.4  DRAIN/DEVICES: no IV access  TELEMETRY RHYTHM: N/A  SKIN: RLE wound done by Dr. Bullock (vascular surgery) yesterday 4/15, WOC RN changed , dressings CDI. Michelle area redness with tenderness, Scattered bruises, +2-3 BLE edema, pulses weak BLE and difficult to palpate d/t dressings.   TESTS/PROCEDURES: NA  D/C DAY/GOALS/PLACE: TBD, pending  TCU vs LTACH placement, SW following.  OTHER IMPORTANT INFO: Maintained contact isolation for Hx of MRSA. PT/WOC following. Scheduled nebs.

## 2022-04-16 NOTE — PROGRESS NOTES
WOUND CARE    Patient with ulceration right leg that underwent excisional debridement and wound VAC 3/21/2022 with Dr. Bullock.     Dressings have been performed most recently 4/15/2022.  Wound is quite clean.  2 layer dynamic wrap applied at that time.    Patient is doing well today.  No concerns.  Dressing will remain in place and can be changed if still in hospital on 4/18/2022.  Does have a wound care appointment on 5/4/2022 with Dr. Bullock.      Quan Folyd MD

## 2022-04-16 NOTE — PROGRESS NOTES
Care Management Follow Up    Length of Stay (days): 24    Expected Discharge Date: 04/18/2022     Concerns to be Addressed:       Patient plan of care discussed at interdisciplinary rounds: Yes    Anticipated Discharge Disposition:  LTAC vs TCU Transitional Care     Anticipated Discharge Services: None  Anticipated Discharge DME:      Patient/family educated on Medicare website which has current facility and service quality ratings:    Education Provided on the Discharge Plan:    Patient/Family in Agreement with the Plan: yes    Referrals Placed by CM/SW:    Private pay costs discussed:     Additional Information:  [facility] has submitted a request for authorization to [employer], one of patient's insurance.. Patient also has [employer] Medicare Advantage listed and [facility] is requesting hospital submit a request to [employer] which is the agency which reviews for BCBS.    Social Work will be contacting [employer] to determine which plan is primary.        KAYLA PimentelSW

## 2022-04-16 NOTE — PLAN OF CARE
Goal Outcome Evaluation:        DATE & TIME:4/15/22 0688-3631  Cognitive Concerns/ Orientation: A&Ox4  BEHAVIOR & AGGRESSION TOOL COLOR: Green    ABNL VS/O2: VSS on 1L NC, cpap at night,   MOBILITY: Assist x1 GBW - refuses GB at times  PAIN MANAGMENT: Tylenol x1  for RLE pain.   DIET: Regular with thin liquids, aspiration precautions, no straws.   BOWEL/BLADDER: continent  ABNL LAB: Hgb 9.4  DRAIN/DEVICES: no IV access  TELEMETRY RHYTHM: N/A  SKIN: RLE wound done by Dr. Bullock (vascular surgery) yesterday 4/15, WOC RN changed it today, dressings CDI. Michelle area redness with tenderness, Scattered bruises, +2-3 BLE edema, pulses weak BLE and difficult to palpate r/t dressings.   TESTS/PROCEDURES: NA  D/C DAY/GOALS/PLACE: TBD, pending  TCU vs LTACH placement, SW following.  OTHER IMPORTANT INFO: Maintained contact isolation for Hx of MRSA. PT/WOC following. Scheduled nebs.

## 2022-04-17 ENCOUNTER — APPOINTMENT (OUTPATIENT)
Dept: GENERAL RADIOLOGY | Facility: CLINIC | Age: 77
DRG: 264 | End: 2022-04-17
Attending: INTERNAL MEDICINE
Payer: COMMERCIAL

## 2022-04-17 LAB
ANION GAP SERPL CALCULATED.3IONS-SCNC: 4 MMOL/L (ref 3–14)
BUN SERPL-MCNC: 24 MG/DL (ref 7–30)
CALCIUM SERPL-MCNC: 9.1 MG/DL (ref 8.5–10.1)
CHLORIDE BLD-SCNC: 103 MMOL/L (ref 94–109)
CO2 SERPL-SCNC: 32 MMOL/L (ref 20–32)
CREAT SERPL-MCNC: 0.92 MG/DL (ref 0.52–1.04)
GFR SERPL CREATININE-BSD FRML MDRD: 65 ML/MIN/1.73M2
GLUCOSE BLD-MCNC: 90 MG/DL (ref 70–99)
POTASSIUM BLD-SCNC: 3.9 MMOL/L (ref 3.4–5.3)
SODIUM SERPL-SCNC: 139 MMOL/L (ref 133–144)

## 2022-04-17 PROCEDURE — 250N000013 HC RX MED GY IP 250 OP 250 PS 637: Performed by: PHYSICIAN ASSISTANT

## 2022-04-17 PROCEDURE — 250N000013 HC RX MED GY IP 250 OP 250 PS 637: Performed by: INTERNAL MEDICINE

## 2022-04-17 PROCEDURE — 120N000001 HC R&B MED SURG/OB

## 2022-04-17 PROCEDURE — 71045 X-RAY EXAM CHEST 1 VIEW: CPT

## 2022-04-17 PROCEDURE — 250N000013 HC RX MED GY IP 250 OP 250 PS 637: Performed by: SURGERY

## 2022-04-17 PROCEDURE — 94660 CPAP INITIATION&MGMT: CPT

## 2022-04-17 PROCEDURE — 36415 COLL VENOUS BLD VENIPUNCTURE: CPT | Performed by: INTERNAL MEDICINE

## 2022-04-17 PROCEDURE — 999N000157 HC STATISTIC RCP TIME EA 10 MIN

## 2022-04-17 PROCEDURE — 94640 AIRWAY INHALATION TREATMENT: CPT

## 2022-04-17 PROCEDURE — 250N000013 HC RX MED GY IP 250 OP 250 PS 637: Performed by: HOSPITALIST

## 2022-04-17 PROCEDURE — 250N000009 HC RX 250: Performed by: SURGERY

## 2022-04-17 PROCEDURE — 94640 AIRWAY INHALATION TREATMENT: CPT | Mod: 76

## 2022-04-17 PROCEDURE — 99232 SBSQ HOSP IP/OBS MODERATE 35: CPT | Performed by: INTERNAL MEDICINE

## 2022-04-17 PROCEDURE — 80048 BASIC METABOLIC PNL TOTAL CA: CPT | Performed by: INTERNAL MEDICINE

## 2022-04-17 RX ADMIN — FERROUS SULFATE TAB 325 MG (65 MG ELEMENTAL FE) 325 MG: 325 (65 FE) TAB at 09:46

## 2022-04-17 RX ADMIN — IPRATROPIUM BROMIDE AND ALBUTEROL SULFATE 3 ML: .5; 3 SOLUTION RESPIRATORY (INHALATION) at 15:38

## 2022-04-17 RX ADMIN — Medication 1 CAPSULE: at 22:40

## 2022-04-17 RX ADMIN — GABAPENTIN 400 MG: 300 CAPSULE ORAL at 09:46

## 2022-04-17 RX ADMIN — PANTOPRAZOLE SODIUM 40 MG: 40 TABLET, DELAYED RELEASE ORAL at 17:07

## 2022-04-17 RX ADMIN — IPRATROPIUM BROMIDE AND ALBUTEROL SULFATE 3 ML: .5; 3 SOLUTION RESPIRATORY (INHALATION) at 07:37

## 2022-04-17 RX ADMIN — GABAPENTIN 400 MG: 300 CAPSULE ORAL at 21:43

## 2022-04-17 RX ADMIN — ROPINIROLE HYDROCHLORIDE 0.5 MG: 0.5 TABLET, FILM COATED ORAL at 13:56

## 2022-04-17 RX ADMIN — GABAPENTIN 400 MG: 300 CAPSULE ORAL at 17:08

## 2022-04-17 RX ADMIN — Medication 1 PACKET: at 09:48

## 2022-04-17 RX ADMIN — FUROSEMIDE 40 MG: 40 TABLET ORAL at 09:46

## 2022-04-17 RX ADMIN — BUPROPION HYDROCHLORIDE 150 MG: 150 TABLET, EXTENDED RELEASE ORAL at 09:46

## 2022-04-17 RX ADMIN — ROPINIROLE HYDROCHLORIDE 0.5 MG: 0.5 TABLET, FILM COATED ORAL at 09:46

## 2022-04-17 RX ADMIN — Medication 1 CAPSULE: at 09:48

## 2022-04-17 RX ADMIN — Medication 1 CAPSULE: at 00:07

## 2022-04-17 RX ADMIN — POTASSIUM CHLORIDE 20 MEQ: 1500 TABLET, EXTENDED RELEASE ORAL at 20:19

## 2022-04-17 RX ADMIN — SPIRONOLACTONE 25 MG: 25 TABLET ORAL at 09:45

## 2022-04-17 RX ADMIN — CITALOPRAM HYDROBROMIDE 40 MG: 20 TABLET, FILM COATED ORAL at 09:45

## 2022-04-17 RX ADMIN — B-COMPLEX W/ C & FOLIC ACID TAB 1 TABLET: TAB at 09:46

## 2022-04-17 RX ADMIN — PANTOPRAZOLE SODIUM 40 MG: 40 TABLET, DELAYED RELEASE ORAL at 06:45

## 2022-04-17 RX ADMIN — MICONAZOLE NITRATE: 20 POWDER TOPICAL at 09:47

## 2022-04-17 RX ADMIN — MULTIPLE VITAMINS W/ MINERALS TAB 1 TABLET: TAB at 09:46

## 2022-04-17 RX ADMIN — POTASSIUM CHLORIDE 40 MEQ: 1500 TABLET, EXTENDED RELEASE ORAL at 09:45

## 2022-04-17 RX ADMIN — MICONAZOLE NITRATE: 20 POWDER TOPICAL at 21:46

## 2022-04-17 RX ADMIN — CYANOCOBALAMIN TAB 1000 MCG 1000 MCG: 1000 TAB at 09:46

## 2022-04-17 RX ADMIN — Medication 250 MCG: at 09:46

## 2022-04-17 RX ADMIN — ROPINIROLE HYDROCHLORIDE 1 MG: 0.5 TABLET, FILM COATED ORAL at 17:52

## 2022-04-17 RX ADMIN — SIMVASTATIN 40 MG: 40 TABLET, FILM COATED ORAL at 21:43

## 2022-04-17 RX ADMIN — ASPIRIN 81 MG CHEWABLE TABLET 81 MG: 81 TABLET CHEWABLE at 20:20

## 2022-04-17 ASSESSMENT — ACTIVITIES OF DAILY LIVING (ADL)
ADLS_ACUITY_SCORE: 11

## 2022-04-17 NOTE — PLAN OF CARE
Goal Outcome Evaluation:    Plan of Care Reviewed With: patient        DATE & TIME:4/16/22 7602-8370  Cognitive Concerns/ Orientation: A&Ox4  BEHAVIOR & AGGRESSION TOOL COLOR: Green    ABNL VS/O2: VSS on 1 L O2 during the day, de sating to mid 80s in RA.CPAP overnight  MOBILITY: Assist x1 GBW   PAIN MANAGMENT: Denies pain  DIET: Regular  diet, no straws- aspiration precautions.  BOWEL/BLADDER: continent, pure wick in place for tonight. Pivot to bedside commode twice.  ABNL LAB: HGB 9.4  DRAIN/DEVICES: no IV access  TELEMETRY RHYTHM: N/A  SKIN: per previous note-RLE wound done by Dr. Bullock (vascular surgery) on 4/15, dressings CDI. Michelle area redness with tenderness cleansed per wound order. Scattered bruises, +2-3 BLE edema, pulses weak BLE TESTS/PROCEDURES: NA  D/C DAY/GOALS/PLACE: TBD, pending  TCU vs LTACH placement, SW following.  OTHER IMPORTANT INFO: Maintained contact isolation for Hx of MRSA. PT/WOC following. Scheduled nebs.

## 2022-04-17 NOTE — PLAN OF CARE
Goal Outcome Evaluation:    DATE & TIME:4/17/22 7986-1450  Cognitive Concerns/ Orientation: A&Ox4  BEHAVIOR & AGGRESSION TOOL COLOR: Green    ABNL VS/O2: VSS on 1 L O2; low 90s with activity. CPAP overnight  MOBILITY: Assist x1 GBW-refuses GB at times; walked hallway x1  PAIN MANAGMENT: Denies pain  DIET: Regular  diet, no straws- aspiration precautions.  BOWEL/BLADDER: continent, Pivot to bedside commode  ABNL LAB: no abnormal labs today; Hgb done 4/14, 9.4  DRAIN/DEVICES: no IV access  TELEMETRY RHYTHM: N/A  SKIN: per previous note-RLE wound done by Dr. Bullock (vascular surgery) on 4/15, dressings CDI. Michelle area redness with tenderness cleansed per wound order. Scattered bruises, +2 BLE edema, pulses weak BLE   TESTS/PROCEDURES: chest x-ray done this AM  D/C DAY/GOALS/PLACE: TBD, pending  TCU vs LTACH placement, SW following.  OTHER IMPORTANT INFO: Maintained contact isolation for Hx of MRSA. PT/WOC following. Scheduled nebs.

## 2022-04-17 NOTE — PROGRESS NOTES
[facility]  Medicine Progress Note - Hospitalist Service    Date of Admission:  3/21/2022    Assessment & Plan        Elizabeth Kelley is a 76 year old female with PMHx MS, chronic BLE lymphedema with venous insufficiency and chronic R leg cellulitis, GERD, hyperlipidemia, and depression who underwent previously scheduled excisional debridement of RLE cicumferential venous hypertensive ulceration and application of wound vac on 3/21/22.   Hospitalist service consulted 3/23/22 for AMS ultimately determined to have sepsis 2/2 CAP with respiratory failure. She had been progressing well, completing antibiotic regimen and weaned to room air with discharge planning in progress until 4/3 with worsening systemic evidence of infection and hypoxia, resumed on broad-spectrum antibiotics and imaging suggestive of bilateral pulmonary infiltrates. Also with development of afib with RVR requiring IV medications, transition to IMC. Cardiac workup unrevealing, has since improved from cardiopulmonary perspective and transferred to Med/Surg.     Sepsis secondary to Pneumonia vs aspiration pneumonia, recurrent  Acute hypoxic respiratory failure secondary to above: now resolved.   Hx of MRSA  Acute on chronic diastolic CHF exacerbation   * on 3/23 Tmax 101.5, tachycardic in the 110s, hypoxic to 86% on room air. WBC 18.3, procal 0.17. CXR with patchy airspace opacities in the left mid and lower lung, suspicious for pneumonia. Treated with Zosyn for CAP, repeat CXR 3/28 with new right upper lobe infiltrate and basilar infiltrate lateral views. Received diuretics for volume overload, transitioned to Augmentin on 3/30 with plans for 14 day course to treat LE cellulitis.  * 4/3 with fever to 102, uptrending WBC with mildly elevated procalcitonin. C/O SOB, noted to be hypoxic. COVID-19, RSV, Influenza PCR neg. UA neg. CXR on 4/2 neg. LE wound valuated by vascular surgery, no evidence of infection. CTA Chest neg for  PE, with bilateral pulmonary infiltrates L>R, most consistent with infectious etiology. Blood cultures x2 repeated remain negative, Resumed IV Zosyn, Vanco. ID consulted.   * 4/5 Video swallow negative, cleared for reg diet with instructions to sit upright, speech continuing to follow  * 4/6 Weaned to 1L O2. I/O   * Weight 284--279  - Zosyn transitioned to Unasyn on 4/5  - ID consulted, appreciate input, plan for Abx until 4/8  - Follow blood cultures, NGTD  - RCAT consulted, encourage pulmonary toilet with IS and acapella.  - Duonebs q4 hrs while awake, PRN albuterol nebs.  - Monitor fluid status.  - IV diuresis with lasix 20mg BID, switched to lasix 40mg po daily 4/15  - Strict I&O, daily weights  - Resumed PTA Aldactone  - Robitussin, tessalon perles prn.  - SLP following    Overall stable require only 1 LPM of oxygen at this time, I think likely had recurrent aspiration Pneumonia given her moderate Hiatal hernia, no trouble with swallowing, recommend to remain upright at least 2 hr after eating to prevent aspirations. Continue Protonix., Continue aggressive IS and flutter. Agree with oral Lasix at this time, repeat xray today, if have any effusion or pulmonary edema can increase the lasix, start Aggressive IS and flutter at this time      Afib with RVR, new diagnosis : now resolved.   NSTEMI  Overnight 4/4 with RRT for development of afib with RVR, rates 130s-150s. In setting of sepsis. EKG with marked ST depression and TWI in lateral leads. Received IV diltiazem bolus with plan to transition to drip, developed three 3-second pauses with conversion to SR. Initiated on heparin drip. Remains in NSR since then.   * Troponin 17--98  * Lexiscan stress negative for inducible ischemia  * TTE with LVEF 60-65%, no WMAs, no valvular dz, mildly elevated PA pressure  - Cardiology consulted, ischemic workup neg, no indication for anticoagulation at this time given setting of sepsis. If afib recurs, would rec AC and tx with  amiodarone rather than diltiazem     Encephalopathy, multifactorial, resolved  Altered mentation noted by nursing during hosptialization. Multifactorial in the setting of fever, narcotic pain medications, suspected underlying BOLIVAR, possible dehydration. Received dose of narcan, but did not significantly change pts mentation immediately, though seemed to improve within the hour after IVF bolus was near completion. Mental status continues to improved.  - Continue expectant management.  - Bipap prn.  - Suspect underlying BOLIVAR, urged her to get evaluated as outpatient.  - Resumed PTA Wellbutrin, Requip as per the patient's request but will closely monitor mentation; low threshold to hold these meds if she becomes confused/lethargic.  - Discontinued PTA lyrica, increased gabapentin by 100mg      Acute blood loss anemia  Chronic normocytic anemia  Baseline 10-11. Down to 8.3 post-procedure. No active bleeding, some bleeding from wound bed with dressing changes. Could be dilutional as checked while receiving IVF bolus   *Received 1u PRBCs 3/25, 3/27, 4/6 for Hgb < 7  *Hgb 9.2  - Additional 1u PRBCs for Hgb < 7  - Discontinued scheduled Celebrex  - Increase PPI proph to BID  - Iron studies suggestive of iron deficiency, started on oral iron supplement.  Hb is stable, check PRN at this time.      Hematuria, resolved  Dysuria - resolved  Noted in AM on 3/30/22. Likely 2/2 trauma/irritation from garrison catheter. Has been on empiric Abx, UA 4/1 neg. Resolved as of 3/31. Garrison removed 4/1.  Urinalysis unremarkable on 4/9.      Chronic BLE lymphedema with venous insufficiency and chronic R leg cellulitis   S/P excisional debridement of RLE  ulceration and application of wound vac (3/21/22)  S/P intraoperative wound examination, wound debridement, and application of myriad 2 layer graft on 3/28  - Defer routine post-operative cares including wound cares to Dr. Bullock.  - Judicious pain control.  - Gabapentin 400 mg po TID, PTA Lyrica  discontinued 4/5 as above  - Continue aldactone and potassium supplement.  Lasix dose at 40mg PO daily   -  on diuretic and on  potassium supplement as well as aldactone, closely monitor K.   Potassium and creatinine remain stable.      Severe Obesity, BMI 50.81  - Follow up with PCP.      MS  Ambulatory at baseline, though some weakness in lower extremities and intermittent upper extremity weakness.     GERD  - Continue PTA Protonix.     Hyperlipidemia  - Continue PTA simvastatin.     Depression  Resumed PTA Celexa, Wellbutrin and Lyrica on 3/26/22. Had been held for a few days given AMS, resumed when mentation back to baseline. As above, weaned lyrica off and solely on gabapentin 4/5.     RLS  - Continue PTA Requip.    Patient overall remain stable.   Encourage her to use IS and flutter.   Try to wean her off the oxygen.  SW/CM on case looking for placement, LTAC at this time.          Diet: Combination Diet Regular Diet; Thin Liquids (level 0) (No straws)    DVT Prophylaxis: Pneumatic Compression Devices  Amato Catheter: Not present  Central Lines: None  Cardiac Monitoring: None  Code Status: Full Code      Disposition Plan   Expected Discharge: stable for discharge pending placement.      Anticipated discharge location: home with family    Delays:     Placement - LTC  Placement - LTAC/ARU            The patient's care was discussed with the Patient, bedside RN and her daughter who is a bedside today .    Vic Stanley MD  Hospitalist Service  [facility]  Securely message with the Vocera Web Console (learn more here)  Text page via [employer] Paging/Directory         Clinically Significant Risk Factors Present on Admission                    ______________________________________________________________________    Interval History   Patient feeling well, offer no complaints, denies any chest pain, SOB, fever, chills, cough, nausea, vomiting, headache or dizziness at this time.     No other  significant event overnight.     Data reviewed today: I reviewed all medications, new labs and imaging results over the last 24 hours. I personally reviewed no images or EKG's today.    Physical Exam   Vital Signs: Temp: 98.5  F (36.9  C) Temp src: Oral BP: (!) 143/61 Pulse: 77   Resp: 18 SpO2: 94 % O2 Device: Nasal cannula Oxygen Delivery: 1 LPM  Weight: 283 lbs 15.24 oz     Constitutional: awake, alert, cooperative, very pleasant, no apparent distress  HEENT: PERRLA EOMI  Lungs: bilateral crackles heard on auscultation.   CVS s1s2 regular. No tachycardia  MSK: legs are edematous, with wraps, wounds not examined as covered with wraps     Data   Recent Labs   Lab 04/17/22  0926 04/16/22  0812 04/15/22  1439 04/14/22  0853 04/14/22  0807 04/11/22  1202 04/11/22  0908   WBC  --   --   --   --  10.7  --   --    HGB  --   --   --   --  9.4*  --  8.9*   MCV  --   --   --   --  90  --   --    PLT  --   --   --   --  537*  --   --     136 137  --  136   < >  --    POTASSIUM 3.9 4.1 4.0  --  3.9   < >  --    CHLORIDE 103 101 103  --  103   < >  --    CO2 32 32 31  --  31   < >  --    BUN 24 22 25  --  26   < >  --    CR 0.92 0.89 0.91  --  0.85   < > 1.01   ANIONGAP 4 3 3  --  2*   < >  --    JUNI 9.1 9.8 9.3  --  9.6   < >  --    GLC 90 99 124*   < > 100*   < >  --     < > = values in this interval not displayed.     Recent Results (from the past 24 hour(s))   XR Chest Port 1 View    Narrative    EXAM: XR CHEST PORTABLE 1 VIEW  LOCATION: [facility]  DATE/TIME: 04/17/2022, 9:25 AM    INDICATION: Hypoxia.  COMPARISON: CT chest, abdomen and pelvis on 04/03/2022.      Impression    IMPRESSION: Single AP view of the chest was obtained. Cardiomediastinal silhouette is within normal limits. Small left pleural effusion and associated basilar atelectasis/consolidation. No significant right pleural effusion. No significant pneumothorax.       Medications     Reason anticoagulant not prescribed for  atrial fibrillation       - MEDICATION INSTRUCTIONS -       - MEDICATION INSTRUCTIONS -         aspirin  81 mg Oral QPM     buPROPion  150 mg Oral QAM     citalopram  40 mg Oral QAM     cyanocobalamin  1,000 mcg Oral Daily     diosmin-hesperidin 450-50  1 capsule Oral BID     ferrous sulfate  325 mg Oral Daily     furosemide  40 mg Oral Daily     gabapentin  400 mg Oral TID     ipratropium - albuterol 0.5 mg/2.5 mg/3 mL  3 mL Nebulization Q4H While awake     Aleksandr  1 packet Oral BID     miconazole   Topical BID     multivitamin w/minerals  1 tablet Oral Daily     pantoprazole  40 mg Oral BID AC     polyethylene glycol  17 g Oral Daily     potassium chloride ER  20 mEq Oral QPM     potassium chloride ER  40 mEq Oral QAM     rOPINIRole  0.5 mg Oral BID     rOPINIRole  1 mg Oral At Bedtime     senna-docusate  1 tablet Oral BID     simvastatin  40 mg Oral At Bedtime     sodium chloride (PF)  3 mL Intracatheter Q8H     spironolactone  25 mg Oral Daily     vitamin B complex with vitamin C  1 tablet Oral Daily     Vitamin D3  250 mcg Oral Daily

## 2022-04-17 NOTE — PROGRESS NOTES
WOUND CARE    Had a more difficult day yesterday for no specific reason.  Comfortable this morning.    Dressings are intact and no plan for change until tomorrow at bedside.       Quan Floyd MD

## 2022-04-17 NOTE — PLAN OF CARE
Goal Outcome Evaluation:    DATE & TIME:4/16/22 5108-7100  Cognitive Concerns/ Orientation: A&Ox4  BEHAVIOR & AGGRESSION TOOL COLOR: Green    ABNL VS/O2: VSS on 1 L O2, de sating to mid 80s in RA.  MOBILITY: Assist x1 GBW   PAIN MANAGMENT: Pain in R leg PRN tylenol x1.  DIET: Regular  diet, no straws- aspiration precautions.  BOWEL/BLADDER: continent, pure wick in place for tonight. Pivot to bedside commode twice.  ABNL LAB: HGB 9.4  DRAIN/DEVICES: no IV access  TELEMETRY RHYTHM: N/A  SKIN: per previous note-RLE wound done by Dr. Bullock (vascular surgery) yesterday 4/15, dressings CDI. Michelle area redness with tenderness cleansed per wound order. Scattered bruises, +2-3 BLE edema, pulses weak BLE   TESTS/PROCEDURES: NA  D/C DAY/GOALS/PLACE: TBD, pending  TCU vs LTACH placement, SW following.  OTHER IMPORTANT INFO: Maintained contact isolation for Hx of MRSA. PT/WOC following. Scheduled nebs.

## 2022-04-18 ENCOUNTER — APPOINTMENT (OUTPATIENT)
Dept: PHYSICAL THERAPY | Facility: CLINIC | Age: 77
DRG: 264 | End: 2022-04-18
Attending: SURGERY
Payer: COMMERCIAL

## 2022-04-18 LAB
ANION GAP SERPL CALCULATED.3IONS-SCNC: 2 MMOL/L (ref 3–14)
BUN SERPL-MCNC: 23 MG/DL (ref 7–30)
CALCIUM SERPL-MCNC: 9.1 MG/DL (ref 8.5–10.1)
CHLORIDE BLD-SCNC: 104 MMOL/L (ref 94–109)
CO2 SERPL-SCNC: 31 MMOL/L (ref 20–32)
CREAT SERPL-MCNC: 0.92 MG/DL (ref 0.52–1.04)
GFR SERPL CREATININE-BSD FRML MDRD: 65 ML/MIN/1.73M2
GLUCOSE BLD-MCNC: 95 MG/DL (ref 70–99)
POTASSIUM BLD-SCNC: 4 MMOL/L (ref 3.4–5.3)
SARS-COV-2 RNA RESP QL NAA+PROBE: NEGATIVE
SODIUM SERPL-SCNC: 137 MMOL/L (ref 133–144)

## 2022-04-18 PROCEDURE — 94640 AIRWAY INHALATION TREATMENT: CPT | Mod: 76

## 2022-04-18 PROCEDURE — U0005 INFEC AGEN DETEC AMPLI PROBE: HCPCS | Performed by: STUDENT IN AN ORGANIZED HEALTH CARE EDUCATION/TRAINING PROGRAM

## 2022-04-18 PROCEDURE — 94640 AIRWAY INHALATION TREATMENT: CPT

## 2022-04-18 PROCEDURE — 99232 SBSQ HOSP IP/OBS MODERATE 35: CPT | Performed by: STUDENT IN AN ORGANIZED HEALTH CARE EDUCATION/TRAINING PROGRAM

## 2022-04-18 PROCEDURE — 999N000157 HC STATISTIC RCP TIME EA 10 MIN

## 2022-04-18 PROCEDURE — 250N000013 HC RX MED GY IP 250 OP 250 PS 637: Performed by: PHYSICIAN ASSISTANT

## 2022-04-18 PROCEDURE — 36415 COLL VENOUS BLD VENIPUNCTURE: CPT | Performed by: INTERNAL MEDICINE

## 2022-04-18 PROCEDURE — 250N000009 HC RX 250: Performed by: SURGERY

## 2022-04-18 PROCEDURE — 80048 BASIC METABOLIC PNL TOTAL CA: CPT | Performed by: INTERNAL MEDICINE

## 2022-04-18 PROCEDURE — 250N000013 HC RX MED GY IP 250 OP 250 PS 637: Performed by: SURGERY

## 2022-04-18 PROCEDURE — 94660 CPAP INITIATION&MGMT: CPT

## 2022-04-18 PROCEDURE — 120N000001 HC R&B MED SURG/OB

## 2022-04-18 PROCEDURE — 250N000013 HC RX MED GY IP 250 OP 250 PS 637: Performed by: HOSPITALIST

## 2022-04-18 PROCEDURE — 94664 DEMO&/EVAL PT USE INHALER: CPT

## 2022-04-18 PROCEDURE — 97116 GAIT TRAINING THERAPY: CPT | Mod: GP | Performed by: PHYSICAL THERAPIST

## 2022-04-18 PROCEDURE — 250N000013 HC RX MED GY IP 250 OP 250 PS 637: Performed by: INTERNAL MEDICINE

## 2022-04-18 PROCEDURE — 97530 THERAPEUTIC ACTIVITIES: CPT | Mod: GP | Performed by: PHYSICAL THERAPIST

## 2022-04-18 RX ADMIN — GABAPENTIN 400 MG: 300 CAPSULE ORAL at 16:25

## 2022-04-18 RX ADMIN — B-COMPLEX W/ C & FOLIC ACID TAB 1 TABLET: TAB at 09:50

## 2022-04-18 RX ADMIN — PANTOPRAZOLE SODIUM 40 MG: 40 TABLET, DELAYED RELEASE ORAL at 16:25

## 2022-04-18 RX ADMIN — POTASSIUM CHLORIDE 40 MEQ: 1500 TABLET, EXTENDED RELEASE ORAL at 09:48

## 2022-04-18 RX ADMIN — ASPIRIN 81 MG CHEWABLE TABLET 81 MG: 81 TABLET CHEWABLE at 20:47

## 2022-04-18 RX ADMIN — BUPROPION HYDROCHLORIDE 150 MG: 150 TABLET, EXTENDED RELEASE ORAL at 09:50

## 2022-04-18 RX ADMIN — SENNOSIDES AND DOCUSATE SODIUM 1 TABLET: 50; 8.6 TABLET ORAL at 20:47

## 2022-04-18 RX ADMIN — IPRATROPIUM BROMIDE AND ALBUTEROL SULFATE 3 ML: .5; 3 SOLUTION RESPIRATORY (INHALATION) at 07:30

## 2022-04-18 RX ADMIN — POTASSIUM CHLORIDE 20 MEQ: 1500 TABLET, EXTENDED RELEASE ORAL at 20:47

## 2022-04-18 RX ADMIN — FERROUS SULFATE TAB 325 MG (65 MG ELEMENTAL FE) 325 MG: 325 (65 FE) TAB at 09:50

## 2022-04-18 RX ADMIN — SIMVASTATIN 40 MG: 40 TABLET, FILM COATED ORAL at 22:18

## 2022-04-18 RX ADMIN — PANTOPRAZOLE SODIUM 40 MG: 40 TABLET, DELAYED RELEASE ORAL at 06:39

## 2022-04-18 RX ADMIN — IPRATROPIUM BROMIDE AND ALBUTEROL SULFATE 3 ML: .5; 3 SOLUTION RESPIRATORY (INHALATION) at 15:05

## 2022-04-18 RX ADMIN — MULTIPLE VITAMINS W/ MINERALS TAB 1 TABLET: TAB at 09:52

## 2022-04-18 RX ADMIN — GABAPENTIN 400 MG: 300 CAPSULE ORAL at 09:51

## 2022-04-18 RX ADMIN — GABAPENTIN 400 MG: 300 CAPSULE ORAL at 22:18

## 2022-04-18 RX ADMIN — ROPINIROLE HYDROCHLORIDE 0.5 MG: 0.5 TABLET, FILM COATED ORAL at 09:49

## 2022-04-18 RX ADMIN — Medication 1 CAPSULE: at 10:01

## 2022-04-18 RX ADMIN — MICONAZOLE NITRATE: 20 POWDER TOPICAL at 20:50

## 2022-04-18 RX ADMIN — ROPINIROLE HYDROCHLORIDE 1 MG: 0.5 TABLET, FILM COATED ORAL at 22:18

## 2022-04-18 RX ADMIN — SENNOSIDES AND DOCUSATE SODIUM 1 TABLET: 50; 8.6 TABLET ORAL at 09:50

## 2022-04-18 RX ADMIN — Medication 1 CAPSULE: at 22:18

## 2022-04-18 RX ADMIN — Medication 1 PACKET: at 09:55

## 2022-04-18 RX ADMIN — IPRATROPIUM BROMIDE AND ALBUTEROL SULFATE 3 ML: .5; 3 SOLUTION RESPIRATORY (INHALATION) at 11:18

## 2022-04-18 RX ADMIN — CITALOPRAM HYDROBROMIDE 40 MG: 20 TABLET, FILM COATED ORAL at 09:50

## 2022-04-18 RX ADMIN — HYDROMORPHONE HYDROCHLORIDE 4 MG: 2 TABLET ORAL at 02:18

## 2022-04-18 RX ADMIN — Medication 1 PACKET: at 22:18

## 2022-04-18 RX ADMIN — FUROSEMIDE 40 MG: 40 TABLET ORAL at 09:50

## 2022-04-18 RX ADMIN — SPIRONOLACTONE 25 MG: 25 TABLET ORAL at 09:51

## 2022-04-18 RX ADMIN — CYANOCOBALAMIN TAB 1000 MCG 1000 MCG: 1000 TAB at 09:50

## 2022-04-18 RX ADMIN — ROPINIROLE HYDROCHLORIDE 0.5 MG: 0.5 TABLET, FILM COATED ORAL at 13:31

## 2022-04-18 RX ADMIN — IPRATROPIUM BROMIDE AND ALBUTEROL SULFATE 3 ML: .5; 3 SOLUTION RESPIRATORY (INHALATION) at 19:11

## 2022-04-18 RX ADMIN — Medication 250 MCG: at 09:50

## 2022-04-18 ASSESSMENT — ACTIVITIES OF DAILY LIVING (ADL)
ADLS_ACUITY_SCORE: 11
ADLS_ACUITY_SCORE: 9
ADLS_ACUITY_SCORE: 11
ADLS_ACUITY_SCORE: 9
ADLS_ACUITY_SCORE: 11

## 2022-04-18 NOTE — PROGRESS NOTES
DATE & TIME:4/17/22 7435-3712    Cognitive Concerns/ Orientation: A&Ox4  BEHAVIOR & AGGRESSION TOOL COLOR: Green    ABNL VS/O2: VSS on 1 L O2; low 90s with activity. CPAP overnight  MOBILITY: Assist x1 GBW-refuses GB at times; walked hallway x1  PAIN MANAGMENT: Denies pain  DIET: Regular  diet, no straws- aspiration precautions.  BOWEL/BLADDER: continent, Pivot to bedside commode  ABNL LAB: no abnormal labs today; Hgb done 4/14, 9.4  DRAIN/DEVICES: no IV access  TELEMETRY RHYTHM: N/A  SKIN: per previous note-RLE wound done by Dr. Bullock (vascular surgery) on 4/15, dressings CDI. Michelle area redness with tenderness cleansed per wound order. Scattered bruises, +2 BLE edema, pulses weak BLE   TESTS/PROCEDURES: chest x-ray done this AM  D/C DAY/GOALS/PLACE: TBD, pending  TCU vs LTACH placement, SW following.  OTHER IMPORTANT INFO: Maintained contact isolation for Hx of MRSA. PT/WOC following.

## 2022-04-18 NOTE — PROGRESS NOTES
[facility]  Medicine Progress Note - Hospitalist Service    Date of Admission:  3/21/2022    Assessment & Plan        Elizabeth Kelley is a 76 year old female with PMHx MS, chronic BLE lymphedema with venous insufficiency and chronic R leg cellulitis, GERD, hyperlipidemia, and depression who underwent previously scheduled excisional debridement of RLE cicumferential venous hypertensive ulceration and application of wound vac on 3/21/22.     Hospitalist service consulted 3/23/22 for AMS ultimately determined to have sepsis 2/2 CAP with respiratory failure. She had been progressing well, completing antibiotic regimen and weaned to room air with discharge planning in progress until 4/3 with worsening systemic evidence of infection and hypoxia, resumed on broad-spectrum antibiotics and imaging suggestive of bilateral pulmonary infiltrates. Also with development of afib with RVR requiring IV medications, transition to IMC. Cardiac workup unrevealing, has since improved from cardiopulmonary perspective and transferred to Med/Surg.     Sepsis secondary to Pneumonia vs aspiration pneumonia, recurrent, resolved  Acute hypoxic respiratory failure secondary to above: now resolved.   Hx of MRSA  Acute on chronic diastolic CHF exacerbation, rsolved  * on 3/23 Tmax 101.5, tachycardic in the 110s, hypoxic to 86% on room air. WBC 18.3, procal 0.17. CXR with patchy airspace opacities in the left mid and lower lung, suspicious for pneumonia. Treated with Zosyn for CAP, repeat CXR 3/28 with new right upper lobe infiltrate and basilar infiltrate lateral views. Received diuretics for volume overload, transitioned to Augmentin on 3/30 with plans for 14 day course to treat LE cellulitis.  * 4/3 with fever to 102, uptrending WBC with mildly elevated procalcitonin. C/O SOB, noted to be hypoxic. COVID-19, RSV, Influenza PCR neg. UA neg. CXR on 4/2 neg. LE wound valuated by vascular surgery, no evidence of infection.  CTA Chest neg for PE, with bilateral pulmonary infiltrates L>R, most consistent with infectious etiology. Blood cultures x2 repeated remain negative, Resumed IV Zosyn, Vanco. ID consulted.   * 4/5 Video swallow negative, cleared for reg diet with instructions to sit upright, speech continuing to follow  * 4/6 Weaned to 1L O2. I/O   * Weight 284--279  * likely recurrent aspiration pneumonia 2/2 hiatal hernia  - Zosyn transitioned to Unasyn on 4/5  - ID consulted, appreciate input, plan for Abx until 4/8  - Follow blood cultures, NGTD  - RCAT consulted, encourage pulmonary toilet with IS and acapella.  - Duonebs q4 hrs while awake, PRN albuterol nebs.  - Monitor fluid status.  - IV diuresis with lasix 20mg BID, switched to lasix 40mg po daily 4/15  - Strict I&O, daily weights  - Resumed PTA Aldactone  - Robitussin, tessalon perles prn.  - SLP following  - Wean O2 as able  - upright after eating for 2 hours  - continue PPI  - continue IS and flutter valve     Afib with RVR, new diagnosis : now resolved.   NSTEMI  Overnight 4/4 with RRT for development of afib with RVR, rates 130s-150s. In setting of sepsis. EKG with marked ST depression and TWI in lateral leads. Received IV diltiazem bolus with plan to transition to drip, developed three 3-second pauses with conversion to SR. Initiated on heparin drip. Remains in NSR since then.   * Troponin 17--98  * Lexiscan stress negative for inducible ischemia  * TTE with LVEF 60-65%, no WMAs, no valvular dz, mildly elevated PA pressure  - Cardiology consulted, ischemic workup neg, no indication for anticoagulation at this time given setting of sepsis. If afib recurs, would rec AC and tx with amiodarone rather than diltiazem     Encephalopathy, multifactorial, resolved  Altered mentation noted by nursing during hosptialization. Multifactorial in the setting of fever, narcotic pain medications, suspected underlying BOLIVAR, possible dehydration. Received dose of narcan, but did not  significantly change pts mentation immediately, though seemed to improve within the hour after IVF bolus was near completion. Mental status continues to improved.  - Continue expectant management.  - Bipap prn.  - Suspect underlying BOLIVAR, urged her to get evaluated as outpatient.  - Resumed PTA Wellbutrin, Requip as per the patient's request but will closely monitor mentation; low threshold to hold these meds if she becomes confused/lethargic.  - Discontinued PTA lyrica, increased gabapentin by 100mg      Acute blood loss anemia  Chronic normocytic anemia  Baseline 10-11. Down to 8.3 post-procedure. No active bleeding, some bleeding from wound bed with dressing changes. Could be dilutional as checked while receiving IVF bolus   *Received 1u PRBCs 3/25, 3/27, 4/6 for Hgb < 7  *Hgb 9.2  - Additional 1u PRBCs for Hgb < 7  - Discontinued scheduled Celebrex  - Increase PPI proph to BID  - Iron studies suggestive of iron deficiency, started on oral iron supplement.  Hb is stable, check PRN at this time.      Hematuria, resolved  Dysuria - resolved  Noted in AM on 3/30/22. Likely 2/2 trauma/irritation from garrison catheter. Has been on empiric Abx, UA 4/1 neg. Resolved as of 3/31. Garrison removed 4/1.  Urinalysis unremarkable on 4/9.      Chronic BLE lymphedema with venous insufficiency and chronic R leg cellulitis   S/P excisional debridement of RLE  ulceration and application of wound vac (3/21/22)  S/P intraoperative wound examination, wound debridement, and application of myriad 2 layer graft on 3/28  - Defer routine post-operative cares including wound cares to Dr. Bullock.  - Judicious pain control.  - Gabapentin 400 mg po TID, PTA Lyrica discontinued 4/5 as above  - Continue aldactone and potassium supplement.  Lasix dose at 40mg PO daily   -  on diuretic and on  potassium supplement as well as aldactone, closely monitor K.   Potassium and creatinine remain stable.      Severe Obesity, BMI 50.81  - Follow up with  PCP.      MS  Ambulatory at baseline, though some weakness in lower extremities and intermittent upper extremity weakness.     GERD  - Continue PTA Protonix.     Hyperlipidemia  - Continue PTA simvastatin.     Depression  Resumed PTA Celexa, Wellbutrin and Lyrica on 3/26/22. Had been held for a few days given AMS, resumed when mentation back to baseline. As above, weaned lyrica off and solely on gabapentin 4/5.     RLS  - Continue PTA Requip.       Diet: Combination Diet Regular Diet; Thin Liquids (level 0) (No straws)    DVT Prophylaxis: Pneumatic Compression Devices  Amato Catheter: Not present  Central Lines: None  Cardiac Monitoring: None  Code Status: Full Code      Disposition Plan   Expected Discharge: stable for discharge pending placement.      Anticipated discharge location: home with family    Delays:     Placement - LTC  Placement - LTAC/ARU            The patient's care was discussed with the Patient, bedside RN and her daughter who is a bedside today .    Jacob Mccormick MD  Hospitalist Service  [facility]  Securely message with the Vocera Web Console (learn more here)  Text page via [employer] Paging/[employer]         Clinically Significant Risk Factors Present on Admission                  Time Spent on this Encounter   I spent 36 minutes on the unit/floor managing the care of Elizabeth Kelley. Over 50% of my time was spent on the following:   - Counseling the patient and/or family regarding: diagnostic results, prognosis, risks and benefits of treatment options and recommended follow-up  - Coordination of care with the: nurse and family    Discussed cxr, need for mobilzation, pulm rehab, wound cares, follow up    Jacob Mccormick MD        ______________________________________________________________________    Interval History   Feels well, ongoing productive cough but no f/c/r. SOB improving daily. Walked once yesterday, encrouage more ambulation.     Data reviewed today:  I reviewed all medications, new labs and imaging results over the last 24 hours. I personally reviewed no images or EKG's today.    Physical Exam   Vital Signs: Temp: 98.4  F (36.9  C) Temp src: Oral BP: 115/55 Pulse: 80   Resp: 16 SpO2: 92 % O2 Device: Nasal cannula Oxygen Delivery: 1 LPM  Weight: 281 lbs 11.97 oz     Constitutional: awake, alert, cooperative, very pleasant, no apparent distress  HEENT: PERRLA EOMI  Lungs: bilateral crackles heard on auscultation.   CVS s1s2 regular. No tachycardia  MSK: legs are edematous, with wraps, wounds not examined as covered with wraps     Data   Recent Labs   Lab 04/17/22  0926 04/16/22  0812 04/15/22  1439 04/14/22  0853 04/14/22  0807 04/11/22  1202 04/11/22  0908   WBC  --   --   --   --  10.7  --   --    HGB  --   --   --   --  9.4*  --  8.9*   MCV  --   --   --   --  90  --   --    PLT  --   --   --   --  537*  --   --     136 137  --  136   < >  --    POTASSIUM 3.9 4.1 4.0  --  3.9   < >  --    CHLORIDE 103 101 103  --  103   < >  --    CO2 32 32 31  --  31   < >  --    BUN 24 22 25  --  26   < >  --    CR 0.92 0.89 0.91  --  0.85   < > 1.01   ANIONGAP 4 3 3  --  2*   < >  --    JUNI 9.1 9.8 9.3  --  9.6   < >  --    GLC 90 99 124*   < > 100*   < >  --     < > = values in this interval not displayed.     Recent Results (from the past 24 hour(s))   XR Chest Port 1 View    Narrative    EXAM: XR CHEST PORTABLE 1 VIEW  LOCATION: [facility]  DATE/TIME: 04/17/2022, 9:25 AM    INDICATION: Hypoxia.  COMPARISON: CT chest, abdomen and pelvis on 04/03/2022.      Impression    IMPRESSION: Single AP view of the chest was obtained. Cardiomediastinal silhouette is within normal limits. Small left pleural effusion and associated basilar atelectasis/consolidation. No significant right pleural effusion. No significant pneumothorax.       Medications     Reason anticoagulant not prescribed for atrial fibrillation       - MEDICATION INSTRUCTIONS -       -  MEDICATION INSTRUCTIONS -         aspirin  81 mg Oral QPM     buPROPion  150 mg Oral QAM     citalopram  40 mg Oral QAM     cyanocobalamin  1,000 mcg Oral Daily     diosmin-hesperidin 450-50  1 capsule Oral BID     ferrous sulfate  325 mg Oral Daily     furosemide  40 mg Oral Daily     gabapentin  400 mg Oral TID     ipratropium - albuterol 0.5 mg/2.5 mg/3 mL  3 mL Nebulization Q4H While awake     Aleksandr  1 packet Oral BID     miconazole   Topical BID     multivitamin w/minerals  1 tablet Oral Daily     pantoprazole  40 mg Oral BID AC     polyethylene glycol  17 g Oral Daily     potassium chloride ER  20 mEq Oral QPM     potassium chloride ER  40 mEq Oral QAM     rOPINIRole  0.5 mg Oral BID     rOPINIRole  1 mg Oral At Bedtime     senna-docusate  1 tablet Oral BID     simvastatin  40 mg Oral At Bedtime     sodium chloride (PF)  3 mL Intracatheter Q8H     spironolactone  25 mg Oral Daily     vitamin B complex with vitamin C  1 tablet Oral Daily     Vitamin D3  250 mcg Oral Daily

## 2022-04-18 NOTE — PLAN OF CARE
Goal Outcome Evaluation:    A&Ox4, forgetful. VSS. On 1L O2 NC. Purewick in place. Up with Ax1 w/GB/walker. Wraps on BLE. No IV access. Pain managed with prn dilaudid. Awaiting TCU placement.

## 2022-04-19 ENCOUNTER — APPOINTMENT (OUTPATIENT)
Dept: PHYSICAL THERAPY | Facility: CLINIC | Age: 77
DRG: 264 | End: 2022-04-19
Attending: SURGERY
Payer: COMMERCIAL

## 2022-04-19 LAB
ANION GAP SERPL CALCULATED.3IONS-SCNC: 1 MMOL/L (ref 3–14)
BUN SERPL-MCNC: 24 MG/DL (ref 7–30)
CALCIUM SERPL-MCNC: 9.4 MG/DL (ref 8.5–10.1)
CHLORIDE BLD-SCNC: 104 MMOL/L (ref 94–109)
CO2 SERPL-SCNC: 32 MMOL/L (ref 20–32)
CREAT SERPL-MCNC: 0.92 MG/DL (ref 0.52–1.04)
GFR SERPL CREATININE-BSD FRML MDRD: 65 ML/MIN/1.73M2
GLUCOSE BLD-MCNC: 104 MG/DL (ref 70–99)
POTASSIUM BLD-SCNC: 4 MMOL/L (ref 3.4–5.3)
SODIUM SERPL-SCNC: 137 MMOL/L (ref 133–144)

## 2022-04-19 PROCEDURE — 97530 THERAPEUTIC ACTIVITIES: CPT | Mod: GP

## 2022-04-19 PROCEDURE — 80048 BASIC METABOLIC PNL TOTAL CA: CPT | Performed by: INTERNAL MEDICINE

## 2022-04-19 PROCEDURE — 250N000013 HC RX MED GY IP 250 OP 250 PS 637: Performed by: SURGERY

## 2022-04-19 PROCEDURE — 250N000013 HC RX MED GY IP 250 OP 250 PS 637: Performed by: PHYSICIAN ASSISTANT

## 2022-04-19 PROCEDURE — 99232 SBSQ HOSP IP/OBS MODERATE 35: CPT | Performed by: STUDENT IN AN ORGANIZED HEALTH CARE EDUCATION/TRAINING PROGRAM

## 2022-04-19 PROCEDURE — 250N000009 HC RX 250: Performed by: SURGERY

## 2022-04-19 PROCEDURE — 94640 AIRWAY INHALATION TREATMENT: CPT

## 2022-04-19 PROCEDURE — 120N000001 HC R&B MED SURG/OB

## 2022-04-19 PROCEDURE — 36415 COLL VENOUS BLD VENIPUNCTURE: CPT | Performed by: INTERNAL MEDICINE

## 2022-04-19 PROCEDURE — 94660 CPAP INITIATION&MGMT: CPT

## 2022-04-19 PROCEDURE — 999N000157 HC STATISTIC RCP TIME EA 10 MIN

## 2022-04-19 PROCEDURE — 97116 GAIT TRAINING THERAPY: CPT | Mod: GP

## 2022-04-19 PROCEDURE — 94640 AIRWAY INHALATION TREATMENT: CPT | Mod: 76

## 2022-04-19 PROCEDURE — 250N000013 HC RX MED GY IP 250 OP 250 PS 637: Performed by: HOSPITALIST

## 2022-04-19 PROCEDURE — 250N000013 HC RX MED GY IP 250 OP 250 PS 637: Performed by: INTERNAL MEDICINE

## 2022-04-19 RX ADMIN — Medication 1 CAPSULE: at 23:21

## 2022-04-19 RX ADMIN — GABAPENTIN 400 MG: 300 CAPSULE ORAL at 16:22

## 2022-04-19 RX ADMIN — Medication 250 MCG: at 09:18

## 2022-04-19 RX ADMIN — ACETAMINOPHEN 650 MG: 325 TABLET, FILM COATED ORAL at 09:17

## 2022-04-19 RX ADMIN — SPIRONOLACTONE 25 MG: 25 TABLET ORAL at 09:18

## 2022-04-19 RX ADMIN — GABAPENTIN 400 MG: 300 CAPSULE ORAL at 09:16

## 2022-04-19 RX ADMIN — IPRATROPIUM BROMIDE AND ALBUTEROL SULFATE 3 ML: .5; 3 SOLUTION RESPIRATORY (INHALATION) at 11:41

## 2022-04-19 RX ADMIN — CYANOCOBALAMIN TAB 1000 MCG 1000 MCG: 1000 TAB at 09:14

## 2022-04-19 RX ADMIN — HYDROMORPHONE HYDROCHLORIDE 4 MG: 2 TABLET ORAL at 09:15

## 2022-04-19 RX ADMIN — ASPIRIN 81 MG CHEWABLE TABLET 81 MG: 81 TABLET CHEWABLE at 21:48

## 2022-04-19 RX ADMIN — ROPINIROLE HYDROCHLORIDE 0.5 MG: 0.5 TABLET, FILM COATED ORAL at 09:14

## 2022-04-19 RX ADMIN — CITALOPRAM HYDROBROMIDE 40 MG: 20 TABLET, FILM COATED ORAL at 09:18

## 2022-04-19 RX ADMIN — MICONAZOLE NITRATE: 20 POWDER TOPICAL at 11:23

## 2022-04-19 RX ADMIN — B-COMPLEX W/ C & FOLIC ACID TAB 1 TABLET: TAB at 09:17

## 2022-04-19 RX ADMIN — HYDROMORPHONE HYDROCHLORIDE 4 MG: 2 TABLET ORAL at 23:14

## 2022-04-19 RX ADMIN — MULTIPLE VITAMINS W/ MINERALS TAB 1 TABLET: TAB at 09:18

## 2022-04-19 RX ADMIN — ROPINIROLE HYDROCHLORIDE 1 MG: 0.5 TABLET, FILM COATED ORAL at 21:49

## 2022-04-19 RX ADMIN — SIMVASTATIN 40 MG: 40 TABLET, FILM COATED ORAL at 21:49

## 2022-04-19 RX ADMIN — GABAPENTIN 400 MG: 300 CAPSULE ORAL at 21:48

## 2022-04-19 RX ADMIN — ACETAMINOPHEN 650 MG: 325 TABLET, FILM COATED ORAL at 23:14

## 2022-04-19 RX ADMIN — FUROSEMIDE 40 MG: 40 TABLET ORAL at 09:21

## 2022-04-19 RX ADMIN — POTASSIUM CHLORIDE 20 MEQ: 1500 TABLET, EXTENDED RELEASE ORAL at 21:48

## 2022-04-19 RX ADMIN — BUPROPION HYDROCHLORIDE 150 MG: 150 TABLET, EXTENDED RELEASE ORAL at 09:18

## 2022-04-19 RX ADMIN — PANTOPRAZOLE SODIUM 40 MG: 40 TABLET, DELAYED RELEASE ORAL at 09:18

## 2022-04-19 RX ADMIN — POTASSIUM CHLORIDE 40 MEQ: 1500 TABLET, EXTENDED RELEASE ORAL at 09:16

## 2022-04-19 RX ADMIN — Medication 1 CAPSULE: at 09:24

## 2022-04-19 RX ADMIN — IPRATROPIUM BROMIDE AND ALBUTEROL SULFATE 3 ML: .5; 3 SOLUTION RESPIRATORY (INHALATION) at 15:48

## 2022-04-19 RX ADMIN — PANTOPRAZOLE SODIUM 40 MG: 40 TABLET, DELAYED RELEASE ORAL at 16:22

## 2022-04-19 RX ADMIN — FERROUS SULFATE TAB 325 MG (65 MG ELEMENTAL FE) 325 MG: 325 (65 FE) TAB at 09:18

## 2022-04-19 RX ADMIN — Medication 1 PACKET: at 09:20

## 2022-04-19 RX ADMIN — ROPINIROLE HYDROCHLORIDE 0.5 MG: 0.5 TABLET, FILM COATED ORAL at 13:11

## 2022-04-19 RX ADMIN — SENNOSIDES AND DOCUSATE SODIUM 1 TABLET: 50; 8.6 TABLET ORAL at 21:49

## 2022-04-19 RX ADMIN — IPRATROPIUM BROMIDE AND ALBUTEROL SULFATE 3 ML: .5; 3 SOLUTION RESPIRATORY (INHALATION) at 19:53

## 2022-04-19 RX ADMIN — MICONAZOLE NITRATE: 20 POWDER TOPICAL at 21:56

## 2022-04-19 RX ADMIN — Medication 1 PACKET: at 21:52

## 2022-04-19 ASSESSMENT — ACTIVITIES OF DAILY LIVING (ADL)
ADLS_ACUITY_SCORE: 12
ADLS_ACUITY_SCORE: 9
ADLS_ACUITY_SCORE: 13
ADLS_ACUITY_SCORE: 9
ADLS_ACUITY_SCORE: 9
ADLS_ACUITY_SCORE: 13
ADLS_ACUITY_SCORE: 16
ADLS_ACUITY_SCORE: 9
ADLS_ACUITY_SCORE: 18
ADLS_ACUITY_SCORE: 9
ADLS_ACUITY_SCORE: 12
ADLS_ACUITY_SCORE: 9
ADLS_ACUITY_SCORE: 18
ADLS_ACUITY_SCORE: 12
ADLS_ACUITY_SCORE: 9
ADLS_ACUITY_SCORE: 13
ADLS_ACUITY_SCORE: 13
ADLS_ACUITY_SCORE: 9

## 2022-04-19 NOTE — PLAN OF CARE
Goal Outcome Evaluation:      DATE & TIME: 4/19/22 [id number]  Cognitive Concerns/ Orientation: A&Ox4  BEHAVIOR & AGGRESSION TOOL COLOR: Green    ABNL VS/O2: VSS on 1 L O2; low 90s with activity. CPAP overnight.  MOBILITY: Assist x1 GBW-refuses GB at times.  PAIN MANAGMENT: 10/10 in AM. Back pain with movement. Gave tylenol and dilaudid x1. Recheck pain 0/10.  DIET: Regular  diet, no straws- aspiration precautions.  BOWEL/BLADDER: incontinent to stool and urine due to pain. Pt wouldn't move to commode until pain controlled. New Purewick placed.  ABNL LAB: no abnormal labs  DRAIN/DEVICES: no IV access  TELEMETRY RHYTHM: N/A  SKIN: RLE wound dressing change done by Dr. Floyd (vascular surgery) on 4/15, dressings CDI. Scattered bruises, +2 BLE edema, pulses weak BLE. Dr. Bullock on vacation this week per wound clinic. Called them for change update, no answer. Try contacting again 4/20 AM.   TESTS/PROCEDURES: chest x-ray completed 4/18.  D/C DAY/GOALS/PLACE: TBD, pending  TCU vs LTACH placement, SW following.  OTHER IMPORTANT INFO: Maintained contact isolation for Hx of MRSA. PT/WOC following. Scheduled nebs.

## 2022-04-19 NOTE — PLAN OF CARE
Goal Outcome Evaluation:      DATE & TIME:4/18/22-4/19/22 [id number]  Cognitive Concerns/ Orientation: A&Ox4  BEHAVIOR & AGGRESSION TOOL COLOR: Green    ABNL VS/O2: VSS on 1 L O2; low 90s with activity. CPAP overnight  MOBILITY: Assist x1 GBW-refuses GB at times  PAIN MANAGMENT: Denies pain  DIET: Regular  diet, no straws- aspiration precautions.  BOWEL/BLADDER: continent, Pivot to bedside commode  ABNL LAB: no abnormal labs; Hgb done 4/14, 9.4  DRAIN/DEVICES: no IV access  TELEMETRY RHYTHM: N/A  SKIN: per previous note-RLE wound done by Dr. Floyd (vascular surgery) on 4/15, dressings CDI. Michelle area redness with tenderness cleansed per wound order. Scattered bruises, +2 BLE edema, pulses weak BLE   TESTS/PROCEDURES: chest x-ray completed 4/18  D/C DAY/GOALS/PLACE: TBD, pending  TCU vs LTACH placement, SW following.  OTHER IMPORTANT INFO: Maintained contact isolation for Hx of MRSA. PT/WOC following. Scheduled nebs.

## 2022-04-19 NOTE — PROGRESS NOTES
[facility]  Medicine Progress Note - Hospitalist Service    Date of Admission:  3/21/2022    Assessment & Plan        Elizabeth Kelley is a 76 year old female with PMHx MS, chronic BLE lymphedema with venous insufficiency and chronic R leg cellulitis, GERD, hyperlipidemia, and depression who underwent previously scheduled excisional debridement of RLE cicumferential venous hypertensive ulceration and application of wound vac on 3/21/22.     Hospitalist service consulted 3/23/22 for AMS ultimately determined to have sepsis 2/2 CAP with respiratory failure. She had been progressing well, completing antibiotic regimen and weaned to room air with discharge planning in progress until 4/3 with worsening systemic evidence of infection and hypoxia, resumed on broad-spectrum antibiotics and imaging suggestive of bilateral pulmonary infiltrates. Also with development of afib with RVR requiring IV medications, transition to IMC. Cardiac workup unrevealing, has since improved from cardiopulmonary perspective and transferred to Med/Surg.     Sepsis secondary to Pneumonia vs aspiration pneumonia, recurrent, resolved  Acute hypoxic respiratory failure secondary to above: now resolved.   Hx of MRSA  Acute on chronic diastolic CHF exacerbation, rsolved  * on 3/23 Tmax 101.5, tachycardic in the 110s, hypoxic to 86% on room air. WBC 18.3, procal 0.17. CXR with patchy airspace opacities in the left mid and lower lung, suspicious for pneumonia. Treated with Zosyn for CAP, repeat CXR 3/28 with new right upper lobe infiltrate and basilar infiltrate lateral views. Received diuretics for volume overload, transitioned to Augmentin on 3/30 with plans for 14 day course to treat LE cellulitis.  * 4/3 with fever to 102, uptrending WBC with mildly elevated procalcitonin. C/O SOB, noted to be hypoxic. COVID-19, RSV, Influenza PCR neg. UA neg. CXR on 4/2 neg. LE wound valuated by vascular surgery, no evidence of infection.  CTA Chest neg for PE, with bilateral pulmonary infiltrates L>R, most consistent with infectious etiology. Blood cultures x2 repeated remain negative, Resumed IV Zosyn, Vanco. ID consulted.   * 4/5 Video swallow negative, cleared for reg diet with instructions to sit upright, speech continuing to follow  * 4/6 Weaned to 1L O2. I/O   * Weight 284--279  * likely recurrent aspiration pneumonia 2/2 hiatal hernia  - Zosyn transitioned to Unasyn on 4/5  - ID consulted, appreciate input, plan for Abx until 4/8  - RCAT consulted, encourage pulmonary toilet with IS and acapella.  - Duonebs q4 hrs while awake, PRN albuterol nebs.  - Monitor fluid status.  - IV diuresis with lasix 20mg BID, switched to lasix 40mg po daily 4/15  - Strict I&O, daily weights  - Resumed PTA Aldactone  - Robitussin, tessalon perles prn.  - SLP following  - Wean O2 as able  - upright after eating for 2 hours  - continue PPI  - continue IS and flutter valve     Afib with RVR, new diagnosis : now resolved.   NSTEMI  Overnight 4/4 with RRT for development of afib with RVR, rates 130s-150s. In setting of sepsis. EKG with marked ST depression and TWI in lateral leads. Received IV diltiazem bolus with plan to transition to drip, developed three 3-second pauses with conversion to SR. Initiated on heparin drip. Remains in NSR since then.   * Troponin 17--98  * Lexiscan stress negative for inducible ischemia  * TTE with LVEF 60-65%, no WMAs, no valvular dz, mildly elevated PA pressure  - Cardiology consulted, ischemic workup neg, no indication for anticoagulation at this time given setting of sepsis. If afib recurs, would rec AC and tx with amiodarone rather than diltiazem     Encephalopathy, multifactorial, resolved  Altered mentation noted by nursing during hosptialization. Multifactorial in the setting of fever, narcotic pain medications, suspected underlying BOLIVAR, possible dehydration. Received dose of narcan, but did not significantly change pts mentation  immediately, though seemed to improve within the hour after IVF bolus was near completion. Mental status continues to improved.  - Continue expectant management.  - Bipap prn.  - Suspect underlying BOLIVAR, urged her to get evaluated as outpatient.  - Resumed PTA Wellbutrin, Requip as per the patient's request but will closely monitor mentation; low threshold to hold these meds if she becomes confused/lethargic.  - Discontinued PTA lyrica, increased gabapentin by 100mg      Acute blood loss anemia  Chronic normocytic anemia  Baseline 10-11. Down to 8.3 post-procedure. No active bleeding, some bleeding from wound bed with dressing changes. Could be dilutional as checked while receiving IVF bolus   *Received 1u PRBCs 3/25, 3/27, 4/6 for Hgb < 7  *Hgb 9.2  - Additional 1u PRBCs for Hgb < 7  - Discontinued scheduled Celebrex  - Increase PPI proph to BID  - Iron studies suggestive of iron deficiency, started on oral iron supplement.  Hb is stable, check PRN at this time.      Hematuria, resolved  Dysuria - resolved  Noted in AM on 3/30/22. Likely 2/2 trauma/irritation from garrison catheter. Has been on empiric Abx, UA 4/1 neg. Resolved as of 3/31. Garrison removed 4/1.  Urinalysis unremarkable on 4/9.      Chronic BLE lymphedema with venous insufficiency and chronic R leg cellulitis   S/P excisional debridement of RLE  ulceration and application of wound vac (3/21/22)  S/P intraoperative wound examination, wound debridement, and application of myriad 2 layer graft on 3/28  - Defer routine post-operative cares including wound cares to Dr. Bullock.  - Judicious pain control.  - Gabapentin 400 mg po TID, PTA Lyrica discontinued 4/5 as above  - Continue aldactone and potassium supplement.  Lasix dose at 40mg PO daily   -  on diuretic and on  potassium supplement as well as aldactone, closely monitor K.   Potassium and creatinine remain stable.      Severe Obesity, BMI 50.81  - Follow up with PCP.      MS  Ambulatory at baseline, though  some weakness in lower extremities and intermittent upper extremity weakness.     GERD  - Continue PTA Protonix.     Hyperlipidemia  - Continue PTA simvastatin.     Depression  Resumed PTA Celexa, Wellbutrin and Lyrica on 3/26/22. Had been held for a few days given AMS, resumed when mentation back to baseline. As above, weaned lyrica off and solely on gabapentin 4/5.     RLS  - Continue PTA Requip.       Diet: Combination Diet Regular Diet; Thin Liquids (level 0) (No straws)    DVT Prophylaxis: Pneumatic Compression Devices  Amato Catheter: Not present  Central Lines: None  Cardiac Monitoring: None  Code Status: Full Code      Disposition Plan   Expected Discharge: stable for discharge pending placement.      Anticipated discharge location: home with family    Delays:     Placement - LTAC/ARU  Placement - TCU            The patient's care was discussed with the Patient, bedside RN and her daughter who is a bedside today .    Jacob Mccormick MD  Hospitalist Service  [facility]  Securely message with the Vocera Web Console (learn more here)  Text page via [employer] Paging/Directory         Clinically Significant Risk Factors Present on Admission                        ______________________________________________________________________    Interval History   Feels okay. Has some back pain this AM that she attributes to sleeping in wrong position in bed, still 1L o2, no other issues. Ara did not see her yesterday as she was told.     Data reviewed today: I reviewed all medications, new labs and imaging results over the last 24 hours. I personally reviewed no images or EKG's today.    Physical Exam   Vital Signs: Temp: 98.8  F (37.1  C) Temp src: Oral BP: (!) 145/69 Pulse: 78   Resp: 18 SpO2: 93 % O2 Device: Nasal cannula Oxygen Delivery: 1 LPM  Weight: 281 lbs 11.97 oz     Constitutional: awake, alert, cooperative, very pleasant, no apparent distress  HEENT: PERRLA EOMI  Lungs: bilateral  crackles heard on auscultation.   CVS s1s2 regular. No tachycardia  MSK: legs are edematous, with wraps, wounds not examined as covered with wraps     Data   Recent Labs   Lab 04/19/22  0809 04/18/22  0845 04/17/22  0926 04/14/22  0853 04/14/22  0807   WBC  --   --   --   --  10.7   HGB  --   --   --   --  9.4*   MCV  --   --   --   --  90   PLT  --   --   --   --  537*    137 139   < > 136   POTASSIUM 4.0 4.0 3.9   < > 3.9   CHLORIDE 104 104 103   < > 103   CO2 32 31 32   < > 31   BUN 24 23 24   < > 26   CR 0.92 0.92 0.92   < > 0.85   ANIONGAP 1* 2* 4   < > 2*   JUNI 9.4 9.1 9.1   < > 9.6   * 95 90   < > 100*    < > = values in this interval not displayed.     No results found for this or any previous visit (from the past 24 hour(s)).  Medications     Reason anticoagulant not prescribed for atrial fibrillation       - MEDICATION INSTRUCTIONS -       - MEDICATION INSTRUCTIONS -         aspirin  81 mg Oral QPM     buPROPion  150 mg Oral QAM     citalopram  40 mg Oral QAM     cyanocobalamin  1,000 mcg Oral Daily     diosmin-hesperidin 450-50  1 capsule Oral BID     ferrous sulfate  325 mg Oral Daily     furosemide  40 mg Oral Daily     gabapentin  400 mg Oral TID     ipratropium - albuterol 0.5 mg/2.5 mg/3 mL  3 mL Nebulization Q4H While awake     Aleksandr  1 packet Oral BID     miconazole   Topical BID     multivitamin w/minerals  1 tablet Oral Daily     pantoprazole  40 mg Oral BID AC     polyethylene glycol  17 g Oral Daily     potassium chloride ER  20 mEq Oral QPM     potassium chloride ER  40 mEq Oral QAM     rOPINIRole  0.5 mg Oral BID     rOPINIRole  1 mg Oral At Bedtime     senna-docusate  1 tablet Oral BID     simvastatin  40 mg Oral At Bedtime     sodium chloride (PF)  3 mL Intracatheter Q8H     spironolactone  25 mg Oral Daily     vitamin B complex with vitamin C  1 tablet Oral Daily     Vitamin D3  250 mcg Oral Daily

## 2022-04-19 NOTE — PROGRESS NOTES
Care Management Follow Up    Length of Stay (days): 28    Expected Discharge Date: 04/21/2022     Concerns to be Addressed:       Patient plan of care discussed at interdisciplinary rounds: Yes    Anticipated Discharge Disposition: Transitional Care     Anticipated Discharge Services: None  Anticipated Discharge DME:      Patient/family educated on Medicare website which has current facility and service quality ratings:    Education Provided on the Discharge Plan:    Patient/Family in Agreement with the Plan: yes    Referrals Placed by CM/SW:    Private pay costs discussed:     Additional Information:  Patient was sleeping today as she was during a previous visit. Writer met with her daughter Carolyn.  Explained that [facility] had sent an authorization request to patient's insurance [facility] on 2/11 and has never received any response.  Ellis at [facility] LTAC admission office does not feel patient meets LTAC criteria anymore and does suggest referrals be sent to TCU.  Carolyn understands the feedback from [facility] and is comfortable with resuming the TCU referrals. We discussed that patient no longer has IV antibiotics or a wound vac.  She also shares Dr Bullock plans the skin graft in 2-3 weeks. Carolyn anticipates her mother will need to return to TCU after the skin graft.  Carolyn is able to confirm her mother's [employer] is primary over her [employer] Medicare Advantage because her mother is still covered under long term disability by her employer.  We discussed that referrals will be need to be made to TCU's which are in the [employer] network.   Daughter's first choice is [facility].  [facility] had declined patient previous when her care needs/acuity was higher.  Resent a referral to [facility].  Tomorrow will check with [facility] regarding TCU's in their network.  Above plan reviewed with daughter Carolyn.      Christina Cabrera, [facility] HospitalSW

## 2022-04-19 NOTE — PROGRESS NOTES
[id number]. Alert and oriented x4. VSS. On 1L of O2, low 90s with activities. Tolerating regular diet; no straws. Denies nausea and vomiting. Assist of one with the gait belt and a walker. Continent of b/b. Pivot to bedside commode. Bilateral legs wound care is done by Dr. Bullock (vascular surgery.)

## 2022-04-20 ENCOUNTER — TELEPHONE (OUTPATIENT)
Dept: WOUND CARE | Facility: CLINIC | Age: 77
End: 2022-04-20
Payer: COMMERCIAL

## 2022-04-20 ENCOUNTER — APPOINTMENT (OUTPATIENT)
Dept: PHYSICAL THERAPY | Facility: CLINIC | Age: 77
DRG: 264 | End: 2022-04-20
Attending: SURGERY
Payer: COMMERCIAL

## 2022-04-20 LAB
ANION GAP SERPL CALCULATED.3IONS-SCNC: 2 MMOL/L (ref 3–14)
BUN SERPL-MCNC: 27 MG/DL (ref 7–30)
CALCIUM SERPL-MCNC: 9.4 MG/DL (ref 8.5–10.1)
CHLORIDE BLD-SCNC: 104 MMOL/L (ref 94–109)
CO2 SERPL-SCNC: 30 MMOL/L (ref 20–32)
CREAT SERPL-MCNC: 0.98 MG/DL (ref 0.52–1.04)
GFR SERPL CREATININE-BSD FRML MDRD: 60 ML/MIN/1.73M2
GLUCOSE BLD-MCNC: 115 MG/DL (ref 70–99)
POTASSIUM BLD-SCNC: 4 MMOL/L (ref 3.4–5.3)
SODIUM SERPL-SCNC: 136 MMOL/L (ref 133–144)

## 2022-04-20 PROCEDURE — 99232 SBSQ HOSP IP/OBS MODERATE 35: CPT | Performed by: INTERNAL MEDICINE

## 2022-04-20 PROCEDURE — 80048 BASIC METABOLIC PNL TOTAL CA: CPT | Performed by: INTERNAL MEDICINE

## 2022-04-20 PROCEDURE — 97116 GAIT TRAINING THERAPY: CPT | Mod: GP

## 2022-04-20 PROCEDURE — 94660 CPAP INITIATION&MGMT: CPT

## 2022-04-20 PROCEDURE — 250N000013 HC RX MED GY IP 250 OP 250 PS 637: Performed by: PHYSICIAN ASSISTANT

## 2022-04-20 PROCEDURE — 250N000013 HC RX MED GY IP 250 OP 250 PS 637: Performed by: HOSPITALIST

## 2022-04-20 PROCEDURE — 250N000009 HC RX 250: Performed by: SURGERY

## 2022-04-20 PROCEDURE — 999N000157 HC STATISTIC RCP TIME EA 10 MIN

## 2022-04-20 PROCEDURE — 250N000013 HC RX MED GY IP 250 OP 250 PS 637: Performed by: SURGERY

## 2022-04-20 PROCEDURE — 94640 AIRWAY INHALATION TREATMENT: CPT

## 2022-04-20 PROCEDURE — 94640 AIRWAY INHALATION TREATMENT: CPT | Mod: 76

## 2022-04-20 PROCEDURE — 120N000001 HC R&B MED SURG/OB

## 2022-04-20 PROCEDURE — 250N000013 HC RX MED GY IP 250 OP 250 PS 637: Performed by: INTERNAL MEDICINE

## 2022-04-20 PROCEDURE — 36415 COLL VENOUS BLD VENIPUNCTURE: CPT | Performed by: INTERNAL MEDICINE

## 2022-04-20 PROCEDURE — 97530 THERAPEUTIC ACTIVITIES: CPT | Mod: GP

## 2022-04-20 RX ORDER — CYCLOBENZAPRINE HCL 5 MG
5 TABLET ORAL EVERY 8 HOURS PRN
Status: DISCONTINUED | OUTPATIENT
Start: 2022-04-20 | End: 2022-04-25

## 2022-04-20 RX ADMIN — Medication 250 MCG: at 09:15

## 2022-04-20 RX ADMIN — IPRATROPIUM BROMIDE AND ALBUTEROL SULFATE 3 ML: .5; 3 SOLUTION RESPIRATORY (INHALATION) at 07:33

## 2022-04-20 RX ADMIN — CYCLOBENZAPRINE HYDROCHLORIDE 5 MG: 5 TABLET, FILM COATED ORAL at 22:14

## 2022-04-20 RX ADMIN — CITALOPRAM HYDROBROMIDE 40 MG: 20 TABLET, FILM COATED ORAL at 09:16

## 2022-04-20 RX ADMIN — BUPROPION HYDROCHLORIDE 150 MG: 150 TABLET, EXTENDED RELEASE ORAL at 09:16

## 2022-04-20 RX ADMIN — GABAPENTIN 400 MG: 300 CAPSULE ORAL at 09:16

## 2022-04-20 RX ADMIN — POTASSIUM CHLORIDE 40 MEQ: 1500 TABLET, EXTENDED RELEASE ORAL at 09:16

## 2022-04-20 RX ADMIN — IPRATROPIUM BROMIDE AND ALBUTEROL SULFATE 3 ML: .5; 3 SOLUTION RESPIRATORY (INHALATION) at 23:27

## 2022-04-20 RX ADMIN — GABAPENTIN 400 MG: 300 CAPSULE ORAL at 21:09

## 2022-04-20 RX ADMIN — CYANOCOBALAMIN TAB 1000 MCG 1000 MCG: 1000 TAB at 09:15

## 2022-04-20 RX ADMIN — CYCLOBENZAPRINE HYDROCHLORIDE 5 MG: 5 TABLET, FILM COATED ORAL at 10:38

## 2022-04-20 RX ADMIN — SIMVASTATIN 40 MG: 40 TABLET, FILM COATED ORAL at 21:08

## 2022-04-20 RX ADMIN — MICONAZOLE NITRATE: 20 POWDER TOPICAL at 21:11

## 2022-04-20 RX ADMIN — MICONAZOLE NITRATE: 20 POWDER TOPICAL at 13:14

## 2022-04-20 RX ADMIN — IPRATROPIUM BROMIDE AND ALBUTEROL SULFATE 3 ML: .5; 3 SOLUTION RESPIRATORY (INHALATION) at 11:25

## 2022-04-20 RX ADMIN — FERROUS SULFATE TAB 325 MG (65 MG ELEMENTAL FE) 325 MG: 325 (65 FE) TAB at 09:16

## 2022-04-20 RX ADMIN — HYDROMORPHONE HYDROCHLORIDE 4 MG: 2 TABLET ORAL at 06:26

## 2022-04-20 RX ADMIN — Medication 1 PACKET: at 21:10

## 2022-04-20 RX ADMIN — ROPINIROLE HYDROCHLORIDE 0.5 MG: 0.5 TABLET, FILM COATED ORAL at 09:16

## 2022-04-20 RX ADMIN — GABAPENTIN 400 MG: 300 CAPSULE ORAL at 16:10

## 2022-04-20 RX ADMIN — ACETAMINOPHEN 650 MG: 325 TABLET, FILM COATED ORAL at 09:16

## 2022-04-20 RX ADMIN — PANTOPRAZOLE SODIUM 40 MG: 40 TABLET, DELAYED RELEASE ORAL at 06:22

## 2022-04-20 RX ADMIN — IPRATROPIUM BROMIDE AND ALBUTEROL SULFATE 3 ML: .5; 3 SOLUTION RESPIRATORY (INHALATION) at 15:41

## 2022-04-20 RX ADMIN — FUROSEMIDE 40 MG: 40 TABLET ORAL at 09:17

## 2022-04-20 RX ADMIN — POTASSIUM CHLORIDE 20 MEQ: 1500 TABLET, EXTENDED RELEASE ORAL at 21:08

## 2022-04-20 RX ADMIN — ROPINIROLE HYDROCHLORIDE 1 MG: 0.5 TABLET, FILM COATED ORAL at 21:15

## 2022-04-20 RX ADMIN — SPIRONOLACTONE 25 MG: 25 TABLET ORAL at 09:17

## 2022-04-20 RX ADMIN — SENNOSIDES AND DOCUSATE SODIUM 1 TABLET: 50; 8.6 TABLET ORAL at 09:16

## 2022-04-20 RX ADMIN — SENNOSIDES AND DOCUSATE SODIUM 1 TABLET: 50; 8.6 TABLET ORAL at 21:10

## 2022-04-20 RX ADMIN — Medication 1 CAPSULE: at 09:27

## 2022-04-20 RX ADMIN — ROPINIROLE HYDROCHLORIDE 0.5 MG: 0.5 TABLET, FILM COATED ORAL at 13:14

## 2022-04-20 RX ADMIN — MULTIPLE VITAMINS W/ MINERALS TAB 1 TABLET: TAB at 09:17

## 2022-04-20 RX ADMIN — Medication 1 CAPSULE: at 21:08

## 2022-04-20 RX ADMIN — Medication 1 PACKET: at 09:18

## 2022-04-20 RX ADMIN — PANTOPRAZOLE SODIUM 40 MG: 40 TABLET, DELAYED RELEASE ORAL at 16:10

## 2022-04-20 RX ADMIN — ACETAMINOPHEN 650 MG: 325 TABLET, FILM COATED ORAL at 21:16

## 2022-04-20 RX ADMIN — B-COMPLEX W/ C & FOLIC ACID TAB 1 TABLET: TAB at 09:17

## 2022-04-20 RX ADMIN — ASPIRIN 81 MG CHEWABLE TABLET 81 MG: 81 TABLET CHEWABLE at 21:08

## 2022-04-20 ASSESSMENT — ACTIVITIES OF DAILY LIVING (ADL)
ADLS_ACUITY_SCORE: 12
ADLS_ACUITY_SCORE: 11
ADLS_ACUITY_SCORE: 12
ADLS_ACUITY_SCORE: 11
ADLS_ACUITY_SCORE: 12
ADLS_ACUITY_SCORE: 11
ADLS_ACUITY_SCORE: 12
ADLS_ACUITY_SCORE: 11
ADLS_ACUITY_SCORE: 12
ADLS_ACUITY_SCORE: 11
ADLS_ACUITY_SCORE: 12
ADLS_ACUITY_SCORE: 11
ADLS_ACUITY_SCORE: 11
ADLS_ACUITY_SCORE: 12

## 2022-04-20 NOTE — PLAN OF CARE
"Goal Outcome Evaluation:    DATE & TIME: 4/19/22-4/20/22 5160-0718  Cognitive Concerns/ Orientation: A&Ox4  BEHAVIOR & AGGRESSION TOOL COLOR: Green    ABNL VS/O2: VSS on 1 L O2; low 90s with activity. Refused CPAP overnight.  MOBILITY: Assist x1 GBW-refuses GB at times.  PAIN MANAGMENT: 10/10 intermittently. Back pain with movement - described as \"lower back muscles pulling apart\". Gave tylenol and dilaudid x1 with relief.   DIET: Regular  diet, no straws- aspiration precautions.  BOWEL/BLADDER: incontinent. New Purewick placed during night.  ABNL LAB: N/A  DRAIN/DEVICES: no IV access  TELEMETRY RHYTHM: N/A  SKIN: RLE wound dressing change done by Dr. Floyd (vascular surgery) on 4/15, dressings CDI. Scattered bruises, +2 BLE edema, pulses weak BLE. Rash to devon area - perineal skin care completed.  TESTS/PROCEDURES: chest x-ray completed 4/18  D/C DAY/GOALS/PLACE: TBD, pending  TCU vs LTACH placement, SW following.  OTHER IMPORTANT INFO: Maintained contact isolation for Hx of MRSA. PT/WOC following. Scheduled nebs.                   "

## 2022-04-20 NOTE — TELEPHONE ENCOUNTER
Call returned to Rosy. Discussed with her to either call the vascular surgeon on call or to call Dr. Bullock. He is out of the office this week but is available via phone.

## 2022-04-20 NOTE — PROGRESS NOTES
"Care Management Follow Up    Length of Stay (days): 29    Expected Discharge Date: 04/21/2022     Concerns to be Addressed:       Patient plan of care discussed at interdisciplinary rounds: Yes    Anticipated Discharge Disposition:  (Long term placement in a SNF or [facility])     Anticipated Discharge Services: None  Anticipated Discharge DME:      Patient/family educated on Medicare website which has current facility and service quality ratings:    Education Provided on the Discharge Plan:    Patient/Family in Agreement with the Plan: yes    Referrals Placed by CM/SW:    Private pay costs discussed:     Additional Information:  Dr Bullock has given new wound care orders for BID dressing changes.  Reviewed with [facility] LT[facility] liaison.  Liaison has been reviewing patient's progress and doesn't believe patient will continue to meet LTAC criteria, however liaison staff will make final determination tomorrow morning once they can review RN notes regarding dressing change this evening, ie does patient require pre-medication, level of complexity  how long does the dressing change take.  TCU is likely more appropriate given patient's improvement.  Referral was made to [facility].  They have declined for now as they would want to admit patient to a bariatric room which is not available.     Plan: Tomorrow will review this evening's RN note with LTAC liaison.  If the final decision is that patient no longer meets LTAC criteria, writer will make additional TCU referrals.     Update:  Met with patient as she had called earlier to talk.  When asked what she would like to talk about she said \"I don't know, I just would like to go home\" She did reference that she knew writer and sister had spoken earlier today.  Writer reviewed with patient that we are arranging for her to admit to a facility that can provide her with 24 hour assistance as needed, explaining her sister is unable to be with her 24 hours a day as she works and has " other obligations.   Explained her sister is involved in the discussion however patient will be kept involved too. Patient did not express opposition.  Two days ago, when writer spoke about discharge to a TCU, patient was very upset.    Patient also talked about her cat being put down on Friday and said her cat would now be with her .  Overall patient appeared in a sad mood and did thank writer for coming to speak with her.     Shared this conversation with her sister via phone and her sister said the mood swings between pleasant affect and being upset are not unusual, she seems to have good days and bad days.    Christina Cabrera, KAYLASW

## 2022-04-20 NOTE — PROGRESS NOTES
[facility]    Hospitalist Progress Note    Assessment & Plan   Elizabeth Kelley is a 76 year old female with PMHx of MS, chronic BLE lymphedema with venous insufficiency and chronic R leg cellulitis, GERD, hyperlipidemia, and depression who underwent a scheduled excisional debridement of RLE cicumferential venous hypertensive ulceration and application of wound vac per Dr. Bullock on 3/21/22.      Hospitalist service consulted 3/23/22 for AMS ultimately determined to have sepsis 2/2 CAP with respiratory failure. She had been progressing well, completing antibiotic regimen and weaned to room air with discharge planning in progress until 4/3 with worsening systemic evidence of infection and hypoxia, resumed on broad-spectrum antibiotics and imaging suggestive of bilateral pulmonary infiltrates. Also with development of afib with RVR requiring IV medications, transition to IMC. Cardiac workup unrevealing, has since improved from cardiopulmonary perspective and transferred to Med/Surg.     Sepsis dt pneumonia vs aspiration pneumonia, recurrent: Resolved   Acute hypoxic respiratory failure dt above: Resolved  Acute on chronic diastolic CHF exacerbation: Resolved  * On 3/23 Tmax 101.5, tachycardic in the 110s, hypoxic to 86% on room air. WBC 18.3, procal 0.17. CXR with patchy airspace opacities in the left mid and lower lung, suspicious for pneumonia. Treated with Zosyn for CAP, repeat CXR 3/28 with new right upper lobe infiltrate and basilar infiltrate lateral views. Received diuretics for volume overload, transitioned to Augmentin on 3/30 with plans for 14 day course to treat LE cellulitis.  * 4/3 with fever to 102, uptrending WBC with mildly elevated procalcitonin. C/O SOB, noted to be hypoxic. COVID-19, RSV, Influenza PCR neg. UA neg. CXR on 4/2 neg. LE wound valuated by vascular surgery, no evidence of infection. CTA Chest neg for PE, with bilateral pulmonary infiltrates L>R, most consistent  with infectious etiology. Blood cultures x2 repeated remain negative, Resumed IV Zosyn, Vanco. ID consulted.   * 4/5 Video swallow negative, cleared for reg diet with instructions to sit upright, speech continuing to follow  * 4/6 Weaned to 1L O2. I/O   * Likely recurrent aspiration pneumonia 2/2 hiatal hernia  * Zosyn transitioned to Unasyn on 4/5  * ID consulted. Completed course of abx on 4/8.   * Diuresed with IV Lasix this stay -- changed to oral Lasix (40mg po daily) on 4/15  -- cont Lasix 40mg po daily, spironolactone 25mg daily  -- cont sched duonebs, prn albuterol nebs   -- pulmonary toilet with IS, flutter valve, OOB as able  -- monitor Is/Os, fluid status  -- prns for cough  -- cont PPI, should remain upright for 2 hrs after meals    Afib with RVR, new diagnosis: Resolved  NSTEMI  * Had RRT on 4/4 for development of afib with RVR, rates 130s-150s in setting of sepsis. EKG with marked ST depression and TWI in lateral leads. Received IV diltiazem bolus with plan to transition to drip, developed three 3-second pauses with conversion to SR. Initiated on heparin drip. Remains in NSR since then. Troponin 17--98  * Lexiscan stress done 4/5/22 was negative for inducible ischemia  * TTE this stay with LVEF 60-65%, no WMAs, no valvular dz, mildly elevated PA pressure  * Seen by cardiology this stay --  ischemic workup neg, no indication for anticoagulation at this time given afib/RVR occurred in the setting of sepsis. If afib recurs, would rec AC and tx with amiodarone rather than diltiazem.     Encephalopathy, multifactorial: Resolved  * Altered mentation noted by nursing during hosptialization. Multifactorial in the setting of fever, narcotic pain medications, suspected underlying BOLIVAR, possible dehydration. Received dose of narcan, but did not significantly change pts mentation immediately, though seemed to improve within the hour after IVF bolus was near completion. Mental status continues to improved.  * Meds  adjusted this stay -- Lyrica stopped, gabapentin increased  * Continues on Wellbutrin, Requip. Keep low threshold to stop meds if confusion recurs  -- cont to reorient  -- suspect some degree of underlying BOLIVAR -- should have sleep study once recovered from surgery    Acute blood loss anemia  Chronic normocytic anemia  * Baseline 10-11. Down to 8.3 post-procedure. No active bleeding, some bleeding from wound bed with dressing changes. Could be dilutional as checked while receiving IVF bolus   * Received 1u PRBCs 3/25, 3/27, 4/6 for Hgb < 7  * NSAIDs (Celebrex) stopped this stay  * Iron studies this stay suggestive of iron deficiency -- started on oral iron supplement  * Hgb stable at 9 this stay    Hematuria: Resolved  Dysuria: Resolved  * Noted on 3/30. Likely dt trauma/irritation from garrison catheter.   * Had been on empiric abx as above. UA 4/1 neg.   * Sx resolved as of 3/31. Garrison removed 4/1.  * Repeat UA on 4/9 was unremarkable.     Chronic BLE lymphedema with venous insufficiency and chronic R leg cellulitis   S/P excisional debridement of RLE  ulceration and application of wound vac (3/21/22)  S/P intraoperative wound examination, wound debridement, and application of myriad 2 layer graft on 3/28  * Routine postop cares per Dr. Bullock including pain control  * Lyrica dc'd 4/5 as above, conts on gabapentin 400mg TID  * On diuretics as above    Low back pain/spasms  * Started 4/19. Exacerbated by lying in bed. Seem to improve when up in chair and ambulation  -- trial of heating pad, low dose flexeril prn    Severe Obesity  *  BMI 50.8 this stay. Follow up with PCP     MS  * Ambulatory at baseline, though has some some weakness in LEs and intermittent UE weakness.     GERD  * Chronic and stable on PPI.     Hyperlipidemia  * Chronic and stable on statin     Depression  * Resumed PTA Celexa, Wellbutrin and Lyrica on 3/26/22 (had been held for a few days given AMS)  * As above, weaned Lyrica off and solely on  gabapentin as of 4/5.     RLS  * Chronic and stable on Requip.     FEN: no IVFs, lytes stable, regular diet  DVT Prophylaxis: PCDs  Code Status: Full Code    Disposition: SW coordinating placement. Medical issues remain stable. Discharge per Dr. Bullock once placment found.    Daughter at bedside, questions answered.     Jia Wu    Interval History   Seen this morning. Reports some persistent back spasms -- mostly when lying in bed. No cp/sob/cough, abd pain/n/v.     -Data reviewed today: I reviewed all new labs and imaging results over the last 24 hours. I personally reviewed no images or EKG's today.    Physical Exam   Temp: 98.9  F (37.2  C) Temp src: Oral BP: 128/51 Pulse: 81   Resp: 18 SpO2: 92 % O2 Device: Nasal cannula Oxygen Delivery: 1 LPM  Vitals:    04/13/22 0658 04/14/22 0604 04/18/22 0643   Weight: 125.6 kg (276 lb 14.4 oz) 128.8 kg (283 lb 15.2 oz) 127.8 kg (281 lb 12 oz)     Vital Signs with Ranges  Temp:  [98.3  F (36.8  C)-99.6  F (37.6  C)] 98.9  F (37.2  C)  Pulse:  [76-81] 81  Resp:  [18] 18  BP: (127-145)/(51-69) 128/51  SpO2:  [90 %-93 %] 92 %  I/O last 3 completed shifts:  In: 240 [P.O.:240]  Out: 1500 [Urine:1500]    Constitutional: Resting comfortably, alert and conversing appropriately, NAD  Respiratory: CTAB, no wheeze/rales/rhonchi, no increased work of breathing  Cardiovascular: HRRR, no MGR, no LE edema  GI: S, NT, ND, +BS  Skin/Integumen: warm/dry, RLE not directly examined by me  Other:      Medications     Reason anticoagulant not prescribed for atrial fibrillation       - MEDICATION INSTRUCTIONS -       - MEDICATION INSTRUCTIONS -         aspirin  81 mg Oral QPM     buPROPion  150 mg Oral QAM     citalopram  40 mg Oral QAM     cyanocobalamin  1,000 mcg Oral Daily     diosmin-hesperidin 450-50  1 capsule Oral BID     ferrous sulfate  325 mg Oral Daily     furosemide  40 mg Oral Daily     gabapentin  400 mg Oral TID     ipratropium - albuterol 0.5 mg/2.5 mg/3 mL  3 mL  Nebulization Q4H While awake     Aleksandr  1 packet Oral BID     miconazole   Topical BID     multivitamin w/minerals  1 tablet Oral Daily     pantoprazole  40 mg Oral BID AC     polyethylene glycol  17 g Oral Daily     potassium chloride ER  20 mEq Oral QPM     potassium chloride ER  40 mEq Oral QAM     rOPINIRole  0.5 mg Oral BID     rOPINIRole  1 mg Oral At Bedtime     senna-docusate  1 tablet Oral BID     simvastatin  40 mg Oral At Bedtime     sodium chloride (PF)  3 mL Intracatheter Q8H     spironolactone  25 mg Oral Daily     vitamin B complex with vitamin C  1 tablet Oral Daily     Vitamin D3  250 mcg Oral Daily       Data   Recent Labs   Lab 04/19/22  0809 04/18/22  0845 04/17/22  0926 04/14/22  0853 04/14/22  0807   WBC  --   --   --   --  10.7   HGB  --   --   --   --  9.4*   MCV  --   --   --   --  90   PLT  --   --   --   --  537*    137 139   < > 136   POTASSIUM 4.0 4.0 3.9   < > 3.9   CHLORIDE 104 104 103   < > 103   CO2 32 31 32   < > 31   BUN 24 23 24   < > 26   CR 0.92 0.92 0.92   < > 0.85   ANIONGAP 1* 2* 4   < > 2*   JUNI 9.4 9.1 9.1   < > 9.6   * 95 90   < > 100*    < > = values in this interval not displayed.       No results found for this or any previous visit (from the past 24 hour(s)).

## 2022-04-20 NOTE — PLAN OF CARE
"Goal Outcome Evaluation:      DATE & TIME: 4/20/22 7655-4802  Cognitive Concerns/ Orientation: A&Ox4  BEHAVIOR & AGGRESSION TOOL COLOR: Green    ABNL VS/O2: VSS on 1 L O2; low 90s with activity. Refused CPAP overnight.  MOBILITY: Assist x1 GBW-refuses GB at times.  PAIN MANAGMENT: 10/10 intermittently. Back pain with movement - described as \"lower back muscles pulling apart\". Gave tylenol x1 with relief. Pt started on PRN Flexeril, given x1 with great relief.  DIET: Regular  diet, no straws- aspiration precautions.  BOWEL/BLADDER: Continent. Purewick placed during night.  ABNL LAB: N/A  DRAIN/DEVICES: no IV access  TELEMETRY RHYTHM: N/A  SKIN: RLE wound dressing change to be completed by nursing BID per Dr. Bullock. dressing changed 1630. Approximately 20 minutes. Remove dressings, cleanse with wound cleanser and removed dead skin. Soak kerlix in vashe and wrapped wet to dry dressings around R leg wound. Cover with dry kerlix and chux.  Scattered bruises, +1-2 BLE edema, pulse normal on bilateral feet. Rash to devon area - perineal skin care completed. L sock removed and applied kris and sween lotion to L leg. Legs elevated on pillows. Keep thigh edema wear off until Friday unless needed per pt and family request.  TESTS/PROCEDURES: none  D/C DAY/GOALS/PLACE: TBD, pending TCU placement, SW following.  OTHER IMPORTANT INFO: Maintained contact isolation for Hx of MRSA. PT/WOC following. Scheduled nebs.                 "

## 2022-04-20 NOTE — TELEPHONE ENCOUNTER
Rosy Foley (RN at [facility]) called regarding patient's wound care. Patient is currently inpatient at [facility]- station [id number]. Caller is looking for clarification and next steps on changing patients wound. Unsure if someone will be coming to do that or if that is something they cover. Please call [phone]

## 2022-04-21 LAB
ANION GAP SERPL CALCULATED.3IONS-SCNC: 4 MMOL/L (ref 3–14)
BUN SERPL-MCNC: 30 MG/DL (ref 7–30)
CALCIUM SERPL-MCNC: 9.3 MG/DL (ref 8.5–10.1)
CHLORIDE BLD-SCNC: 104 MMOL/L (ref 94–109)
CO2 SERPL-SCNC: 31 MMOL/L (ref 20–32)
CREAT SERPL-MCNC: 0.86 MG/DL (ref 0.52–1.04)
GFR SERPL CREATININE-BSD FRML MDRD: 70 ML/MIN/1.73M2
GLUCOSE BLD-MCNC: 98 MG/DL (ref 70–99)
GLUCOSE BLDC GLUCOMTR-MCNC: 97 MG/DL (ref 70–99)
POTASSIUM BLD-SCNC: 3.9 MMOL/L (ref 3.4–5.3)
SODIUM SERPL-SCNC: 139 MMOL/L (ref 133–144)

## 2022-04-21 PROCEDURE — 250N000013 HC RX MED GY IP 250 OP 250 PS 637: Performed by: SURGERY

## 2022-04-21 PROCEDURE — 99232 SBSQ HOSP IP/OBS MODERATE 35: CPT | Performed by: INTERNAL MEDICINE

## 2022-04-21 PROCEDURE — 36415 COLL VENOUS BLD VENIPUNCTURE: CPT | Performed by: INTERNAL MEDICINE

## 2022-04-21 PROCEDURE — 999N000157 HC STATISTIC RCP TIME EA 10 MIN

## 2022-04-21 PROCEDURE — 80048 BASIC METABOLIC PNL TOTAL CA: CPT | Performed by: INTERNAL MEDICINE

## 2022-04-21 PROCEDURE — 94640 AIRWAY INHALATION TREATMENT: CPT

## 2022-04-21 PROCEDURE — 250N000013 HC RX MED GY IP 250 OP 250 PS 637: Performed by: HOSPITALIST

## 2022-04-21 PROCEDURE — 120N000001 HC R&B MED SURG/OB

## 2022-04-21 PROCEDURE — G0463 HOSPITAL OUTPT CLINIC VISIT: HCPCS

## 2022-04-21 PROCEDURE — 94640 AIRWAY INHALATION TREATMENT: CPT | Mod: 76

## 2022-04-21 PROCEDURE — 250N000013 HC RX MED GY IP 250 OP 250 PS 637: Performed by: PHYSICIAN ASSISTANT

## 2022-04-21 PROCEDURE — 94660 CPAP INITIATION&MGMT: CPT

## 2022-04-21 PROCEDURE — 250N000013 HC RX MED GY IP 250 OP 250 PS 637: Performed by: INTERNAL MEDICINE

## 2022-04-21 PROCEDURE — 250N000009 HC RX 250: Performed by: SURGERY

## 2022-04-21 RX ADMIN — ROPINIROLE HYDROCHLORIDE 0.5 MG: 0.5 TABLET, FILM COATED ORAL at 11:20

## 2022-04-21 RX ADMIN — MICONAZOLE NITRATE: 20 POWDER TOPICAL at 20:34

## 2022-04-21 RX ADMIN — Medication 1 PACKET: at 11:21

## 2022-04-21 RX ADMIN — IPRATROPIUM BROMIDE AND ALBUTEROL SULFATE 3 ML: .5; 3 SOLUTION RESPIRATORY (INHALATION) at 23:19

## 2022-04-21 RX ADMIN — IPRATROPIUM BROMIDE AND ALBUTEROL SULFATE 3 ML: .5; 3 SOLUTION RESPIRATORY (INHALATION) at 15:14

## 2022-04-21 RX ADMIN — POTASSIUM CHLORIDE 40 MEQ: 1500 TABLET, EXTENDED RELEASE ORAL at 11:19

## 2022-04-21 RX ADMIN — FERROUS SULFATE TAB 325 MG (65 MG ELEMENTAL FE) 325 MG: 325 (65 FE) TAB at 11:19

## 2022-04-21 RX ADMIN — CYCLOBENZAPRINE HYDROCHLORIDE 5 MG: 5 TABLET, FILM COATED ORAL at 11:14

## 2022-04-21 RX ADMIN — Medication 250 MCG: at 11:19

## 2022-04-21 RX ADMIN — SENNOSIDES AND DOCUSATE SODIUM 1 TABLET: 50; 8.6 TABLET ORAL at 20:31

## 2022-04-21 RX ADMIN — IPRATROPIUM BROMIDE AND ALBUTEROL SULFATE 3 ML: .5; 3 SOLUTION RESPIRATORY (INHALATION) at 11:30

## 2022-04-21 RX ADMIN — PANTOPRAZOLE SODIUM 40 MG: 40 TABLET, DELAYED RELEASE ORAL at 11:20

## 2022-04-21 RX ADMIN — BUPROPION HYDROCHLORIDE 150 MG: 150 TABLET, EXTENDED RELEASE ORAL at 11:19

## 2022-04-21 RX ADMIN — CITALOPRAM HYDROBROMIDE 40 MG: 20 TABLET, FILM COATED ORAL at 11:20

## 2022-04-21 RX ADMIN — SPIRONOLACTONE 25 MG: 25 TABLET ORAL at 11:20

## 2022-04-21 RX ADMIN — B-COMPLEX W/ C & FOLIC ACID TAB 1 TABLET: TAB at 11:21

## 2022-04-21 RX ADMIN — SIMVASTATIN 40 MG: 40 TABLET, FILM COATED ORAL at 21:49

## 2022-04-21 RX ADMIN — MULTIPLE VITAMINS W/ MINERALS TAB 1 TABLET: TAB at 11:19

## 2022-04-21 RX ADMIN — MICONAZOLE NITRATE: 20 POWDER TOPICAL at 11:23

## 2022-04-21 RX ADMIN — CYCLOBENZAPRINE HYDROCHLORIDE 5 MG: 5 TABLET, FILM COATED ORAL at 20:31

## 2022-04-21 RX ADMIN — ACETAMINOPHEN 650 MG: 325 TABLET, FILM COATED ORAL at 16:51

## 2022-04-21 RX ADMIN — Medication 1 PACKET: at 20:31

## 2022-04-21 RX ADMIN — GABAPENTIN 400 MG: 300 CAPSULE ORAL at 16:36

## 2022-04-21 RX ADMIN — ASPIRIN 81 MG CHEWABLE TABLET 81 MG: 81 TABLET CHEWABLE at 20:31

## 2022-04-21 RX ADMIN — ROPINIROLE HYDROCHLORIDE 0.5 MG: 0.5 TABLET, FILM COATED ORAL at 14:47

## 2022-04-21 RX ADMIN — FUROSEMIDE 40 MG: 40 TABLET ORAL at 11:20

## 2022-04-21 RX ADMIN — IPRATROPIUM BROMIDE AND ALBUTEROL SULFATE 3 ML: .5; 3 SOLUTION RESPIRATORY (INHALATION) at 20:02

## 2022-04-21 RX ADMIN — SENNOSIDES AND DOCUSATE SODIUM 1 TABLET: 50; 8.6 TABLET ORAL at 11:20

## 2022-04-21 RX ADMIN — Medication 1 CAPSULE: at 11:19

## 2022-04-21 RX ADMIN — Medication 1 CAPSULE: at 21:48

## 2022-04-21 RX ADMIN — IPRATROPIUM BROMIDE AND ALBUTEROL SULFATE 3 ML: .5; 3 SOLUTION RESPIRATORY (INHALATION) at 07:22

## 2022-04-21 RX ADMIN — PANTOPRAZOLE SODIUM 40 MG: 40 TABLET, DELAYED RELEASE ORAL at 16:36

## 2022-04-21 RX ADMIN — GABAPENTIN 400 MG: 300 CAPSULE ORAL at 21:49

## 2022-04-21 RX ADMIN — CYANOCOBALAMIN TAB 1000 MCG 1000 MCG: 1000 TAB at 11:21

## 2022-04-21 RX ADMIN — POTASSIUM CHLORIDE 20 MEQ: 1500 TABLET, EXTENDED RELEASE ORAL at 20:31

## 2022-04-21 RX ADMIN — GABAPENTIN 400 MG: 300 CAPSULE ORAL at 11:20

## 2022-04-21 RX ADMIN — ROPINIROLE HYDROCHLORIDE 1 MG: 0.5 TABLET, FILM COATED ORAL at 21:48

## 2022-04-21 ASSESSMENT — ACTIVITIES OF DAILY LIVING (ADL)
ADLS_ACUITY_SCORE: 11
ADLS_ACUITY_SCORE: 13
ADLS_ACUITY_SCORE: 11
ADLS_ACUITY_SCORE: 11
ADLS_ACUITY_SCORE: 13
ADLS_ACUITY_SCORE: 11
ADLS_ACUITY_SCORE: 13
ADLS_ACUITY_SCORE: 11
ADLS_ACUITY_SCORE: 13
ADLS_ACUITY_SCORE: 11
ADLS_ACUITY_SCORE: 13
ADLS_ACUITY_SCORE: 11
ADLS_ACUITY_SCORE: 11

## 2022-04-21 NOTE — PLAN OF CARE
Goal Outcome Evaluation:      DATE & TIME: 4/21/22 8789-4513  Cognitive Concerns/ Orientation: A&Ox4  BEHAVIOR & AGGRESSION TOOL COLOR: Green    ABNL VS/O2: VSS on 1 L O2; low 90s with activity. Refused CPAP overnight. Used CPAP early morning.  MOBILITY: Assist x1 GBW-refuses GB in room. Uses GB in hallway with ambulation.  PAIN MANAGMENT: Woke with back pain 9/10, flexeril given x1 with relief.   DIET: Regular  diet, no straws- aspiration precautions.  BOWEL/BLADDER: Continent. Purewick placed during night. Up with walker to bedside commode during day for B&B.  ABNL LAB: N/A  DRAIN/DEVICES: no IV access  TELEMETRY RHYTHM: N/A  SKIN: RLE wound dressing change to be completed by nursing BID per Dr. Bullock. Dressing changed with WOCN 1030. Scattered bruises, +1-2 BLE edema, pulse normal on both feet. Rash to devon area - perineal skin care completed with WOCN. L leg edema wraps applied in AM.  TESTS/PROCEDURES: none  D/C DAY/GOALS/PLACE: TBD, pending  TCU placement, SW following.  OTHER IMPORTANT INFO: Maintained contact isolation for Hx of MRSA. PT/WOC following. Scheduled nebs.

## 2022-04-21 NOTE — PLAN OF CARE
Goal Outcome Evaluation:        DATE & TIME: 4/21/22 8229-6251  Cognitive Concerns/ Orientation: A&Ox4  BEHAVIOR & AGGRESSION TOOL COLOR: Green    ABNL VS/O2: VSS on 1 L O2; low 90s with activity. Refused CPAP overnight. Used CPAP early morning.  MOBILITY: Assist x1 GBW-refuses GB in room. Uses GB in hallway with ambulation.  PAIN MANAGMENT: Woke with back pain 9/10, flexeril given x1 with relief.   DIET: Regular  diet, no straws- aspiration precautions.  BOWEL/BLADDER: Continent. Purewick placed during night. Up with walker to bedside commode during day for B&B.  ABNL LAB: N/A  DRAIN/DEVICES: no IV access  TELEMETRY RHYTHM: N/A  SKIN: RLE wound dressing change to be completed by nursing BID per Dr. Bullock. Dressing changed with WOCN 1030. Scattered bruises, +1-2 BLE edema, pulse normal on both feet. Rash to devon area - perineal skin care completed with WOCN. L leg edema wraps applied in AM.  TESTS/PROCEDURES: none  D/C DAY/GOALS/PLACE: TBD, pending  TCU placement, SW following.  OTHER IMPORTANT INFO: Maintained contact isolation for Hx of MRSA. PT/WOC following. Scheduled nebs.

## 2022-04-21 NOTE — PROGRESS NOTES
[facility]    Hospitalist Progress Note    Assessment & Plan   Elizabeth Kelley is a 76 year old female with PMHx of MS, chronic BLE lymphedema with venous insufficiency and chronic R leg cellulitis, GERD, hyperlipidemia, and depression who underwent a scheduled excisional debridement of RLE cicumferential venous hypertensive ulceration and application of wound vac per Dr. Bullock on 3/21/22.      Hospitalist service consulted 3/23/22 for AMS ultimately determined to have sepsis 2/2 CAP with respiratory failure. She had been progressing well, completing antibiotic regimen and weaned to room air with discharge planning in progress until 4/3 with worsening systemic evidence of infection and hypoxia, resumed on broad-spectrum antibiotics and imaging suggestive of bilateral pulmonary infiltrates. Also with development of afib with RVR requiring IV medications, transition to IMC. Cardiac workup unrevealing, has since improved from cardiopulmonary perspective and transferred to Med/Surg.     Sepsis dt pneumonia vs aspiration pneumonia, recurrent: Resolved   Acute hypoxic respiratory failure dt above: Resolved  Acute on chronic diastolic CHF exacerbation: Resolved  * On 3/23 Tmax 101.5, tachycardic in the 110s, hypoxic to 86% on room air. WBC 18.3, procal 0.17. CXR with patchy airspace opacities in the left mid and lower lung, suspicious for pneumonia. Treated with Zosyn for CAP, repeat CXR 3/28 with new right upper lobe infiltrate and basilar infiltrate lateral views. Received diuretics for volume overload, transitioned to Augmentin on 3/30 with plans for 14 day course to treat LE cellulitis.  * 4/3 with fever to 102, uptrending WBC with mildly elevated procalcitonin. C/O SOB, noted to be hypoxic. COVID-19, RSV, Influenza PCR neg. UA neg. CXR on 4/2 neg. LE wound valuated by vascular surgery, no evidence of infection. CTA Chest neg for PE, with bilateral pulmonary infiltrates L>R, most consistent  with infectious etiology. Blood cultures x2 repeated remain negative, Resumed IV Zosyn, Vanco. ID consulted.   * 4/5 Video swallow negative, cleared for reg diet with instructions to sit upright, speech continuing to follow  * 4/6 Weaned to 1L O2. I/O   * Likely recurrent aspiration pneumonia 2/2 hiatal hernia  * Zosyn transitioned to Unasyn on 4/5  * ID consulted. Completed course of abx on 4/8.   * Diuresed with IV Lasix this stay -- changed to oral Lasix (40mg po daily) on 4/15  -- cont Lasix 40mg po daily, spironolactone 25mg daily  -- cont sched duonebs, prn albuterol nebs   -- pulmonary toilet with IS, flutter valve, OOB as able  -- monitor Is/Os, fluid status  -- prns for cough  -- cont PPI, should remain upright for 2 hrs after meals    Afib with RVR, new diagnosis: Resolved  NSTEMI  * Had RRT on 4/4 for development of afib with RVR, rates 130s-150s in setting of sepsis. EKG with marked ST depression and TWI in lateral leads. Received IV diltiazem bolus with plan to transition to drip, developed three 3-second pauses with conversion to SR. Initiated on heparin drip. Remains in NSR since then. Troponin 17--98  * Lexiscan stress done 4/5/22 was negative for inducible ischemia  * TTE this stay with LVEF 60-65%, no WMAs, no valvular dz, mildly elevated PA pressure  * Seen by cardiology this stay --  ischemic workup neg, no indication for anticoagulation at this time given afib/RVR occurred in the setting of sepsis. If afib recurs, would rec AC and tx with amiodarone rather than diltiazem.     Encephalopathy, multifactorial: Resolved  * Altered mentation noted by nursing during hosptialization. Multifactorial in the setting of fever, narcotic pain medications, suspected underlying BOLIVAR, possible dehydration. Received dose of narcan, but did not significantly change pts mentation immediately, though seemed to improve within the hour after IVF bolus was near completion. Mental status continues to improved.  * Meds  adjusted this stay -- Lyrica stopped, gabapentin increased  * Continues on Wellbutrin, Requip. Keep low threshold to stop meds if confusion recurs  -- cont to reorient  -- suspect some degree of underlying BOLIVAR -- should have sleep study once recovered from surgery    Acute blood loss anemia  Chronic normocytic anemia  * Baseline 10-11. Down to 8.3 post-procedure. No active bleeding, some bleeding from wound bed with dressing changes. Could be dilutional as checked while receiving IVF bolus   * Received 1u PRBCs 3/25, 3/27, 4/6 for Hgb < 7  * NSAIDs (Celebrex) stopped this stay  * Iron studies this stay suggestive of iron deficiency -- started on oral iron supplement  * Hgb stable at 9 this stay    Hematuria: Resolved  Dysuria: Resolved  * Noted on 3/30. Likely dt trauma/irritation from garrison catheter.   * Had been on empiric abx as above. UA 4/1 neg.   * Sx resolved as of 3/31. Garrison removed 4/1.  * Repeat UA on 4/9 was unremarkable.     Chronic BLE lymphedema with venous insufficiency and chronic R leg cellulitis   S/P excisional debridement of RLE  ulceration and application of wound vac (3/21/22)  S/P intraoperative wound examination, wound debridement, and application of myriad 2 layer graft on 3/28  * Routine postop cares per Dr. Bullock including pain control  * Lyrica dc'd 4/5 as above, conts on gabapentin 400mg TID  * On diuretics as above    Low back pain/spasms  * Started 4/19. Exacerbated by lying in bed. Seem to improve when up in chair and ambulation  -- trial of heating pad vs ice pack, low dose flexeril prn    Severe Obesity  *  BMI 50.8 this stay. Follow up with PCP     MS  * Ambulatory at baseline, though has some some weakness in LEs and intermittent UE weakness.     GERD  * Chronic and stable on PPI.     Hyperlipidemia  * Chronic and stable on statin     Depression  * Resumed PTA Celexa, Wellbutrin and Lyrica on 3/26/22 (had been held for a few days given AMS)  * As above, weaned Lyrica off and  solely on gabapentin as of 4/5.     RLS  * Chronic and stable on Requip.     FEN: no IVFs, lytes stable, regular diet  DVT Prophylaxis: PCDs  Code Status: Full Code    Disposition: SW coordinating placement. Medical issues remain stable. Discharge per Dr. Bullock once placment found.    Jia Wu    Interval History    Seen this afternoon. Some back spasms but better than yesterday. No reported cp/sob/cough, abd pain/n/v.     -Data reviewed today: I reviewed all new labs and imaging results over the last 24 hours. I personally reviewed no images or EKG's today.    Physical Exam   Temp: 98.7  F (37.1  C) Temp src: Axillary BP: 137/60 Pulse: 76   Resp: 18 SpO2: 95 % O2 Device: BiPAP/CPAP Oxygen Delivery: 1 LPM  Vitals:    04/14/22 0604 04/18/22 0643 04/21/22 0621   Weight: 128.8 kg (283 lb 15.2 oz) 127.8 kg (281 lb 12 oz) 122.4 kg (269 lb 13.5 oz)     Vital Signs with Ranges  Temp:  [98.3  F (36.8  C)-98.8  F (37.1  C)] 98.7  F (37.1  C)  Pulse:  [76-85] 76  Resp:  [18] 18  BP: (119-142)/(51-60) 137/60  SpO2:  [91 %-95 %] 95 %  I/O last 3 completed shifts:  In: 2240 [P.O.:2240]  Out: 1000 [Urine:1000]    Constitutional: Resting comfortably, alert and conversing appropriately, NAD  Respiratory: CTAB, no wheeze/rales/rhonchi, no increased work of breathing  Cardiovascular: HRRR, no MGR, no LE edema  GI: S, NT, ND, +BS  Skin/Integumen: warm/dry, RLE not directly examined by me  Other:      Medications     Reason anticoagulant not prescribed for atrial fibrillation       - MEDICATION INSTRUCTIONS -       - MEDICATION INSTRUCTIONS -         aspirin  81 mg Oral QPM     buPROPion  150 mg Oral QAM     citalopram  40 mg Oral QAM     cyanocobalamin  1,000 mcg Oral Daily     diosmin-hesperidin 450-50  1 capsule Oral BID     ferrous sulfate  325 mg Oral Daily     furosemide  40 mg Oral Daily     gabapentin  400 mg Oral TID     ipratropium - albuterol 0.5 mg/2.5 mg/3 mL  3 mL Nebulization Q4H While awake     Aleksandr  1  packet Oral BID     miconazole   Topical BID     multivitamin w/minerals  1 tablet Oral Daily     pantoprazole  40 mg Oral BID AC     polyethylene glycol  17 g Oral Daily     potassium chloride ER  20 mEq Oral QPM     potassium chloride ER  40 mEq Oral QAM     rOPINIRole  0.5 mg Oral BID     rOPINIRole  1 mg Oral At Bedtime     senna-docusate  1 tablet Oral BID     simvastatin  40 mg Oral At Bedtime     spironolactone  25 mg Oral Daily     vitamin B complex with vitamin C  1 tablet Oral Daily     Vitamin D3  250 mcg Oral Daily       Data   Recent Labs   Lab 04/21/22  0900 04/21/22  0858 04/20/22  1116 04/19/22  0809     --  136 137   POTASSIUM 3.9  --  4.0 4.0   CHLORIDE 104  --  104 104   CO2 31  --  30 32   BUN 30  --  27 24   CR 0.86  --  0.98 0.92   ANIONGAP 4  --  2* 1*   JUNI 9.3  --  9.4 9.4   GLC 98 97 115* 104*       No results found for this or any previous visit (from the past 24 hour(s)).

## 2022-04-21 NOTE — PROGRESS NOTES
[facility]    On April 7, 2022 received referral.  Authorization was submitted on 4/11.  On 4/19 we discovered through [facility] that it had been approved but we were never notified of this approval. Insurance apologized for the error.   Insurance also required auth through [employer] which needed to be done by [facility] and was not completed.  In reviewing the patient today--now that dressing changes are ordered, patient does not meet LTAC criteria.  No IV abx, no IV pain medication and dressing changes are BID by nurse taking 20 minutes.    Due to upcoming outpatient appointment on May 4th and possible graft in 2-3 weeks, the best option would be TCU for discharge.    Shari Rodriguez, RN, BSN, HNB-BC  Utilization , RN -- [facility]  [phone]

## 2022-04-21 NOTE — PLAN OF CARE
Goal Outcome Evaluation:       DATE & TIME: 4/20/22 1513-3419  Cognitive Concerns/ Orientation: A&Ox4  BEHAVIOR & AGGRESSION TOOL COLOR: Green    ABNL VS/O2: VSS on 1 L O2; low 90s with activity. Refused CPAP overnight.  MOBILITY: Assist x1 GBW-refuses GB at times.  PAIN MANAGMENT: tylenol & flexeril x1 with relief.   DIET: Regular  diet, no straws- aspiration precautions.  BOWEL/BLADDER: Continent. Purewick placed during night.  ABNL LAB: N/A  DRAIN/DEVICES: no IV access  TELEMETRY RHYTHM: N/A  SKIN: RLE wound dressing change to be completed by nursing BID per Dr. Bullock. Scattered bruises, +1-2 BLE edema, pulse normal on left foot, ABENA on right foot d/t wrap. Rash to devon area - perineal skin care completed. L sock removed and applied kris and sween lotion to L leg. Legs elevated on pillows.  TESTS/PROCEDURES: none  D/C DAY/GOALS/PLACE: TBD, pending  TCU placement, SW following.  OTHER IMPORTANT INFO: Maintained contact isolation for Hx of MRSA. PT/WOC following. Scheduled nebs.

## 2022-04-21 NOTE — PROGRESS NOTES
[facility] Inpatient Wound Assessment     Reason for consultation: Evaluate and treat perineal area     Assessment  Perineal area, inner thighs, gluteal cleft: IAD (incontinence-associated dermatitis) and MASD (moisture-associated skin damage), related to urinary and fecal incontinence and chafing/sweating in skin folds, and fungal rash.  Much improved this week.  No obvious pressure injury at this time.  Continue current POC.     RLE managed by Vascular Surgery and Wound clinic, they now have orders for Nursing to do BID Vashe-moist Kerlix dressing changes.  [facility] assisted Nursing with dressing today 4/21 since was already in room for perineal assessment, leg/wounds look relatively clean and stable, pt tolerating dressings well.    Treatment Plan  Perineal cares (groin, inner thighs, gluteal cleft): every shift and prn incontinence:  1.  Cleanse all areas with generous amount Rivas perineal lotion and soft dry wipes.  Spray the Rivas directly on the skin.  Avoid use of the blue bag wipes in devon area.   2.  Apply antifungal powder (ask MD to order) to skin folds, inner thighs, gluteal cleft.  Spread the powder evenly and thinly with a dry cloth.    3.  Apply Critic-aid barrier paste along the gluteal cleft/ inner buttocks and perianal areas only (avoid anterior groin/labia).  4.  Keep briefs off when in bed.  Have pt lie with legs apart as much as possible; consider using small fan to blow on area prn.     *PIP measures.  Reposition pt every 1-2 hrs.  Keep HOB as low as tolerated.   *Continue PureWick as needed when resting/sleeping but check/adjust it at least every 2 hrs/ with every turn.     If above protocol not effective, might consider use of Cavilon Advanced to inner thigh breakdown in future.  For now will continue the Rivas lotion to help condition and protect the skin, and the AF powder to prevent further rash development, to dry out the overly moist areas, and to minimize  chafing.      Orders Reviewed  Recommended provider order: None, at this time  WOC Nurse follow-up plan: weekly  Nursing to notify the Provider(s) and re-consult the WOC Nurse if wound(s) deteriorates or new skin concern.    Patient History  According to provider note(s):  Elizabeth Kelley is a 76 year old female with PMHx MS, chronic BLE lymphedema with venous insufficiency and chronic R leg cellulitis, GERD, hyperlipidemia, and depression who underwent previously scheduled excisional debridement of RLE cicumferential venous hypertensive ulceration and application of wound vac on 3/21/22. Hospitalist service consulted 3/23/22 for AMS ultimately determined to have sepsis 2/2 CAP with respiratory failure. She had been progressing well, completing antibiotic regimen and weaned to room air with worsening systemic evidence of infection and hypoxia on 4/3, resumed on broad-spectrum antibiotics and imaging suggestive of bilateral pulmonary infiltrates. Also with new diagnosis of afib with RVR requiring IV medications, transition to CCU.     Objective Data  Containment of urine/stool: Incontinence Protocol, pull-on briefs, purewick, toilet as able    Active Diet Order:  Orders Placed This Encounter      Combination Diet Regular Diet; Thin Liquids (level 0) (No straws)    Output:   I/O last 3 completed shifts:  In: 2240 [P.O.:2240]  Out: 1000 [Urine:1000]    Risk Assessment:   Sensory Perception: 3-->slightly limited  Moisture: 3-->occasionally moist  Activity: 3-->walks occasionally  Mobility: 3-->slightly limited  Nutrition: 3-->adequate  Friction and Shear: 2-->potential problem  Ajith Score: 17                          Labs:   No lab results found in last 7 days.    Invalid input(s): GLUCOMBO    Physical Exam  Skin inspection: focused perineal areas    Wound Location:  Gluteal cleft  Date of last photo:  4-21-22      Wound History: pt incontinent of urine and stool.  Frequent dribbling especially when coughs.  Using  purewick at night, trying to get up to toilet during the day.  Pt obese and edematous with deep skin folds, trapping moisture.  Able to turn well with assist x 1-2.    Measurements (length x width x depth, in cm):   approx previously approx 5 x 0.3 x 0.1cm, now appears lightly intact  Wound Base: pink newly resurfaced tissue   Tunneling: N/A  Undermining: N/A  Palpation of the wound bed: normal   Periwound skin: deep pink erythema/rash to perineal area  Color: pink  Temperature: normal   Drainage: none  Odor: none  Pain: mild    Wound Location:  Inner thighs  Date of last photo:  4-21-22      Wound History: pt incontinent of urine and stool.  Frequent dribbling especially when coughs.  Using purewick at night, trying to get up to toilet during the day.  Pt obese and edematous with deep skin folds, trapping moisture.  Able to turn well with assist x 1-2.    Measurements (length x width x depth, in cm): scattered areas, approx 10 x 10cm each side, no depth  Wound Base: pink lightly resurfaced peeling tissue, lighter pink this week, more intact, less rashy.  Gluteal cleft now healed/intact.   Palpation of the wound bed: normal   Periwound skin: pink, peely  Color: pink  Temperature: normal   Drainage: none  Odor: none  Pain: mild    Interventions  Current support surface: Standard  Atmos Air mattress  Current off-loading measures: Pillows  Visual inspection of wound(s) completed  Wound Care: done per plan of care  Supplies: reviewed  Education provided: importance of repositioning, plan of care, wound progress, Moisture management and Off-loading pressure  Discussed plan of care with Patient and Nurse    Vida Badillo RN, CWOCN

## 2022-04-21 NOTE — PROGRESS NOTES
"Care Management Follow Up    Length of Stay (days): 30    Expected Discharge Date: 04/21/2022     Concerns to be Addressed:       Patient plan of care discussed at interdisciplinary rounds: Yes    Anticipated Discharge Disposition:  TCU     Anticipated Discharge Services: None  Anticipated Discharge DME:      Patient/family educated on Medicare website which has current facility and service quality ratings:    Education Provided on the Discharge Plan:    Patient/Family in Agreement with the Plan: yes    Referrals Placed by CM/SW:    Private pay costs discussed:     Additional Information:  [facility] admission liaison reviewed RN notes from yesterday regarding dressing change.  Based on the low complexity of the dressing change, patient no longer meets criteria for LTAC.   Now that this decision is made, all our focus will be upon referrals to TCU.  [facility], which is family 1st choice, does not have a bariatric room available at this time and doesn't know when a bariatric room will become available.    Update:  Patient busy at this time.  Writer did call Carolyn regarding TCU options. Explained [facility] is not available at this time.  Her preference is for a TCU as close as possible to hospital area as patient will need to return to see Dr Bullock.    Patient is 5'1\" and her body weight is 269 lb., so TCU's will likely need to admit patient to a bariatric room and with her MRSA hx from 2021 they may want to admit patient to a private room.  Referrals sent and calls made to check on vacancies:    [facility] - as of today no private rooms and they would want to admit patient to a private room    [facility]- no beds    [facility] - has a private room but assessing multiple referrals, decline, they are not in patient's insurance network.    [facility]- declined, no bed available    [facility]        Christina Cabrera, [facility] CenterSW      "

## 2022-04-22 ENCOUNTER — APPOINTMENT (OUTPATIENT)
Dept: PHYSICAL THERAPY | Facility: CLINIC | Age: 77
DRG: 264 | End: 2022-04-22
Attending: SURGERY
Payer: COMMERCIAL

## 2022-04-22 PROCEDURE — 94640 AIRWAY INHALATION TREATMENT: CPT | Mod: 76

## 2022-04-22 PROCEDURE — 97116 GAIT TRAINING THERAPY: CPT | Mod: GP

## 2022-04-22 PROCEDURE — 94640 AIRWAY INHALATION TREATMENT: CPT

## 2022-04-22 PROCEDURE — 250N000013 HC RX MED GY IP 250 OP 250 PS 637: Performed by: SURGERY

## 2022-04-22 PROCEDURE — 250N000013 HC RX MED GY IP 250 OP 250 PS 637: Performed by: INTERNAL MEDICINE

## 2022-04-22 PROCEDURE — 250N000009 HC RX 250: Performed by: SURGERY

## 2022-04-22 PROCEDURE — 999N000157 HC STATISTIC RCP TIME EA 10 MIN

## 2022-04-22 PROCEDURE — 250N000013 HC RX MED GY IP 250 OP 250 PS 637: Performed by: PHYSICIAN ASSISTANT

## 2022-04-22 PROCEDURE — 250N000013 HC RX MED GY IP 250 OP 250 PS 637: Performed by: HOSPITALIST

## 2022-04-22 PROCEDURE — 94660 CPAP INITIATION&MGMT: CPT

## 2022-04-22 PROCEDURE — 99232 SBSQ HOSP IP/OBS MODERATE 35: CPT | Performed by: HOSPITALIST

## 2022-04-22 PROCEDURE — 120N000001 HC R&B MED SURG/OB

## 2022-04-22 PROCEDURE — 97110 THERAPEUTIC EXERCISES: CPT | Mod: GP

## 2022-04-22 RX ORDER — ROPINIROLE 0.5 MG/1
0.5 TABLET, FILM COATED ORAL 2 TIMES DAILY
DISCHARGE
Start: 2022-04-23 | End: 2024-07-16

## 2022-04-22 RX ORDER — GABAPENTIN 400 MG/1
400 CAPSULE ORAL 3 TIMES DAILY
DISCHARGE
Start: 2022-04-22 | End: 2024-07-16

## 2022-04-22 RX ADMIN — IPRATROPIUM BROMIDE AND ALBUTEROL SULFATE 3 ML: .5; 3 SOLUTION RESPIRATORY (INHALATION) at 07:15

## 2022-04-22 RX ADMIN — GUAIFENESIN 10 ML: 100 SOLUTION ORAL at 20:40

## 2022-04-22 RX ADMIN — CYANOCOBALAMIN TAB 1000 MCG 1000 MCG: 1000 TAB at 09:18

## 2022-04-22 RX ADMIN — SENNOSIDES AND DOCUSATE SODIUM 1 TABLET: 50; 8.6 TABLET ORAL at 09:18

## 2022-04-22 RX ADMIN — MICONAZOLE NITRATE: 20 POWDER TOPICAL at 09:21

## 2022-04-22 RX ADMIN — B-COMPLEX W/ C & FOLIC ACID TAB 1 TABLET: TAB at 09:19

## 2022-04-22 RX ADMIN — ROPINIROLE HYDROCHLORIDE 1 MG: 0.5 TABLET, FILM COATED ORAL at 22:35

## 2022-04-22 RX ADMIN — PANTOPRAZOLE SODIUM 40 MG: 40 TABLET, DELAYED RELEASE ORAL at 06:33

## 2022-04-22 RX ADMIN — ROPINIROLE HYDROCHLORIDE 0.5 MG: 0.5 TABLET, FILM COATED ORAL at 09:18

## 2022-04-22 RX ADMIN — ROPINIROLE HYDROCHLORIDE 0.5 MG: 0.5 TABLET, FILM COATED ORAL at 12:52

## 2022-04-22 RX ADMIN — FUROSEMIDE 40 MG: 40 TABLET ORAL at 09:19

## 2022-04-22 RX ADMIN — PANTOPRAZOLE SODIUM 40 MG: 40 TABLET, DELAYED RELEASE ORAL at 15:50

## 2022-04-22 RX ADMIN — SIMVASTATIN 40 MG: 40 TABLET, FILM COATED ORAL at 22:35

## 2022-04-22 RX ADMIN — Medication 1 CAPSULE: at 09:21

## 2022-04-22 RX ADMIN — IPRATROPIUM BROMIDE AND ALBUTEROL SULFATE 3 ML: .5; 3 SOLUTION RESPIRATORY (INHALATION) at 23:28

## 2022-04-22 RX ADMIN — CITALOPRAM HYDROBROMIDE 40 MG: 20 TABLET, FILM COATED ORAL at 09:18

## 2022-04-22 RX ADMIN — Medication 1 CAPSULE: at 22:35

## 2022-04-22 RX ADMIN — ASPIRIN 81 MG CHEWABLE TABLET 81 MG: 81 TABLET CHEWABLE at 20:40

## 2022-04-22 RX ADMIN — POTASSIUM CHLORIDE 40 MEQ: 1500 TABLET, EXTENDED RELEASE ORAL at 09:19

## 2022-04-22 RX ADMIN — POTASSIUM CHLORIDE 20 MEQ: 1500 TABLET, EXTENDED RELEASE ORAL at 20:40

## 2022-04-22 RX ADMIN — GUAIFENESIN 10 ML: 100 SOLUTION ORAL at 14:52

## 2022-04-22 RX ADMIN — MICONAZOLE NITRATE: 20 POWDER TOPICAL at 21:31

## 2022-04-22 RX ADMIN — ACETAMINOPHEN 650 MG: 325 TABLET, FILM COATED ORAL at 14:52

## 2022-04-22 RX ADMIN — SENNOSIDES AND DOCUSATE SODIUM 1 TABLET: 50; 8.6 TABLET ORAL at 20:40

## 2022-04-22 RX ADMIN — CYCLOBENZAPRINE HYDROCHLORIDE 5 MG: 5 TABLET, FILM COATED ORAL at 06:33

## 2022-04-22 RX ADMIN — SPIRONOLACTONE 25 MG: 25 TABLET ORAL at 09:19

## 2022-04-22 RX ADMIN — Medication 250 MCG: at 09:19

## 2022-04-22 RX ADMIN — IPRATROPIUM BROMIDE AND ALBUTEROL SULFATE 3 ML: .5; 3 SOLUTION RESPIRATORY (INHALATION) at 19:06

## 2022-04-22 RX ADMIN — GABAPENTIN 400 MG: 300 CAPSULE ORAL at 09:18

## 2022-04-22 RX ADMIN — IPRATROPIUM BROMIDE AND ALBUTEROL SULFATE 3 ML: .5; 3 SOLUTION RESPIRATORY (INHALATION) at 10:58

## 2022-04-22 RX ADMIN — GABAPENTIN 400 MG: 300 CAPSULE ORAL at 15:50

## 2022-04-22 RX ADMIN — MULTIPLE VITAMINS W/ MINERALS TAB 1 TABLET: TAB at 09:18

## 2022-04-22 RX ADMIN — BUPROPION HYDROCHLORIDE 150 MG: 150 TABLET, EXTENDED RELEASE ORAL at 09:18

## 2022-04-22 RX ADMIN — GABAPENTIN 400 MG: 300 CAPSULE ORAL at 22:35

## 2022-04-22 RX ADMIN — Medication 1 PACKET: at 09:40

## 2022-04-22 RX ADMIN — FERROUS SULFATE TAB 325 MG (65 MG ELEMENTAL FE) 325 MG: 325 (65 FE) TAB at 09:18

## 2022-04-22 RX ADMIN — Medication 1 PACKET: at 22:34

## 2022-04-22 ASSESSMENT — ACTIVITIES OF DAILY LIVING (ADL)
ADLS_ACUITY_SCORE: 11

## 2022-04-22 NOTE — PROGRESS NOTES
[facility]    Hospitalist Progress Note    Assessment & Plan   Elizabeth Kelley is a 76 year old female with PMHx of MS, chronic BLE lymphedema with venous insufficiency and chronic R leg cellulitis, GERD, hyperlipidemia, and depression who underwent a scheduled excisional debridement of RLE cicumferential venous hypertensive ulceration and application of wound vac per Dr. Bullock on 3/21/22.      Hospitalist service consulted 3/23/22 for AMS ultimately determined to have sepsis 2/2 CAP with respiratory failure. She had been progressing well, completing antibiotic regimen and weaned to room air with discharge planning in progress until 4/3 with worsening systemic evidence of infection and hypoxia, resumed on broad-spectrum antibiotics and imaging suggestive of bilateral pulmonary infiltrates. Also with development of afib with RVR requiring IV medications, transition to IMC. Cardiac workup unrevealing, has since improved from cardiopulmonary perspective and transferred to Med/Surg.     Sepsis dt pneumonia vs aspiration pneumonia, recurrent: Resolved   Acute hypoxic respiratory failure dt above: Resolved  Acute on chronic diastolic CHF exacerbation: Resolved  * On 3/23 Tmax 101.5, tachycardic in the 110s, hypoxic to 86% on room air. WBC 18.3, procal 0.17. CXR with patchy airspace opacities in the left mid and lower lung, suspicious for pneumonia. Treated with Zosyn for CAP, repeat CXR 3/28 with new right upper lobe infiltrate and basilar infiltrate lateral views. Received diuretics for volume overload, transitioned to Augmentin on 3/30 with plans for 14 day course to treat LE cellulitis.  * 4/3 with fever to 102, uptrending WBC with mildly elevated procalcitonin. C/O SOB, noted to be hypoxic. COVID-19, RSV, Influenza PCR neg. UA neg. CXR on 4/2 neg. LE wound valuated by vascular surgery, no evidence of infection. CTA Chest neg for PE, with bilateral pulmonary infiltrates L>R, most consistent  with infectious etiology. Blood cultures x2 repeated remain negative, Resumed IV Zosyn, Vanco. ID consulted.   * 4/5 Video swallow negative, cleared for reg diet with instructions to sit upright, speech continuing to follow  * 4/6 Weaned to 1L O2. I/O   * Likely recurrent aspiration pneumonia 2/2 hiatal hernia  * Zosyn transitioned to Unasyn on 4/5  * ID consulted. Completed course of abx on 4/8.   * Diuresed with IV Lasix this stay -- changed to oral Lasix (40mg po daily) on 4/15  -- cont Lasix 40mg po daily, spironolactone 25mg daily  -- cont sched duonebs, prn albuterol nebs   -- pulmonary toilet with IS, flutter valve, OOB as able  -- monitor Is/Os, fluid status  -- prns for cough  -- cont PPI, should remain upright for 2 hrs after meals    Afib with RVR, new diagnosis: Resolved  NSTEMI  * Had RRT on 4/4 for development of afib with RVR, rates 130s-150s in setting of sepsis. EKG with marked ST depression and TWI in lateral leads. Received IV diltiazem bolus with plan to transition to drip, developed three 3-second pauses with conversion to SR. Initiated on heparin drip. Remains in NSR since then. Troponin 17--98  * Lexiscan stress done 4/5/22 was negative for inducible ischemia  * TTE this stay with LVEF 60-65%, no WMAs, no valvular dz, mildly elevated PA pressure  * Seen by cardiology this stay --  ischemic workup neg, no indication for anticoagulation at this time given afib/RVR occurred in the setting of sepsis. If afib recurs, would rec AC and tx with amiodarone rather than diltiazem.     Encephalopathy, multifactorial: Resolved  * Altered mentation noted by nursing during hosptialization. Multifactorial in the setting of fever, narcotic pain medications, suspected underlying BOLIVAR, possible dehydration. Received dose of narcan, but did not significantly change pts mentation immediately, though seemed to improve within the hour after IVF bolus was near completion. Mental status continues to improved.  * Meds  adjusted this stay -- Lyrica stopped, gabapentin increased  * Continues on Wellbutrin, Requip. Keep low threshold to stop meds if confusion recurs  -- cont to reorient  -- suspect some degree of underlying BOLIVAR -- should have sleep study once recovered from surgery    Acute blood loss anemia  Chronic normocytic anemia  * Baseline 10-11. Down to 8.3 post-procedure. No active bleeding, some bleeding from wound bed with dressing changes. Could be dilutional as checked while receiving IVF bolus   * Received 1u PRBCs 3/25, 3/27, 4/6 for Hgb < 7  * NSAIDs (Celebrex) stopped this stay  * Iron studies this stay suggestive of iron deficiency -- started on oral iron supplement  * Hgb stable at 9 this stay    Hematuria: Resolved  Dysuria: Resolved  * Noted on 3/30. Likely dt trauma/irritation from garrison catheter.   * Had been on empiric abx as above. UA 4/1 neg.   * Sx resolved as of 3/31. Garrison removed 4/1.  * Repeat UA on 4/9 was unremarkable.     Chronic BLE lymphedema with venous insufficiency and chronic R leg cellulitis   S/P excisional debridement of RLE  ulceration and application of wound vac (3/21/22)  S/P intraoperative wound examination, wound debridement, and application of myriad 2 layer graft on 3/28  * Routine postop cares per Dr. Bullock including pain control  * Lyrica dc'd 4/5 as above, conts on gabapentin 400mg TID  * On diuretics as above    Low back pain/spasms  * Started 4/19. Exacerbated by lying in bed. Seem to improve when up in chair and ambulation  -- trial of heating pad vs ice pack, low dose flexeril prn    Severe Obesity  *  BMI 50.8 this stay. Follow up with PCP     MS  * Ambulatory at baseline, though has some some weakness in LEs and intermittent UE weakness.     GERD  * Chronic and stable on PPI.     Hyperlipidemia  * Chronic and stable on statin     Depression  * Resumed PTA Celexa, Wellbutrin and Lyrica on 3/26/22 (had been held for a few days given AMS)  * As above, weaned Lyrica off and  solely on gabapentin as of 4/5.     RLS  * Chronic and stable on Requip.     FEN: no IVFs, lytes stable, regular diet  DVT Prophylaxis: PCDs  Code Status: Full Code    Disposition: SW coordinating placement. Medical issues remain stable. Discharge per Dr. Bullock once placment found.    Latesha Cottrell    Interval History    Stable overnight.  Complains of some back spasms but overall pain is controlled.  No other new symptoms.  Currently awaiting placement.  Apparently Dr. Bullock is on vacation per patient, she anticipates skin grafting surgery in the next couple of weeks.    -Data reviewed today: I reviewed all new labs and imaging results over the last 24 hours. I personally reviewed no images or EKG's today.    Physical Exam   Temp: 99.1  F (37.3  C) Temp src: Axillary BP: 131/56 Pulse: 84   Resp: 16 SpO2: 92 % O2 Device: None (Room air) Oxygen Delivery: 2 LPM  Vitals:    04/18/22 0643 04/21/22 0621 04/22/22 0700   Weight: 127.8 kg (281 lb 12 oz) 122.4 kg (269 lb 13.5 oz) 120 kg (264 lb 8.8 oz)     Vital Signs with Ranges  Temp:  [98.6  F (37  C)-99.1  F (37.3  C)] 99.1  F (37.3  C)  Pulse:  [82-84] 84  Resp:  [16-17] 16  BP: (115-154)/(46-63) 131/56  SpO2:  [92 %-94 %] 92 %  I/O last 3 completed shifts:  In: 480 [P.O.:480]  Out: 450 [Urine:450]    Constitutional: Resting comfortably, alert and conversing appropriately, NAD  Respiratory: CTAB,  no increased work of breathing  Cardiovascular: HRRR, bilateral lower extremity edema noted, in wraps  GI: S, NT, ND, +BS  Skin/Integumen: warm/dry, RLE not directly examined by me  Other: Calm and pleasant    Medications     Reason anticoagulant not prescribed for atrial fibrillation       - MEDICATION INSTRUCTIONS -       - MEDICATION INSTRUCTIONS -         aspirin  81 mg Oral QPM     buPROPion  150 mg Oral QAM     citalopram  40 mg Oral QAM     cyanocobalamin  1,000 mcg Oral Daily     diosmin-hesperidin 450-50  1 capsule Oral BID     ferrous sulfate  325 mg Oral Daily      furosemide  40 mg Oral Daily     gabapentin  400 mg Oral TID     ipratropium - albuterol 0.5 mg/2.5 mg/3 mL  3 mL Nebulization Q4H While awake     Aleksandr  1 packet Oral BID     miconazole   Topical BID     multivitamin w/minerals  1 tablet Oral Daily     pantoprazole  40 mg Oral BID AC     polyethylene glycol  17 g Oral Daily     potassium chloride ER  20 mEq Oral QPM     potassium chloride ER  40 mEq Oral QAM     rOPINIRole  0.5 mg Oral BID     rOPINIRole  1 mg Oral At Bedtime     senna-docusate  1 tablet Oral BID     simvastatin  40 mg Oral At Bedtime     spironolactone  25 mg Oral Daily     vitamin B complex with vitamin C  1 tablet Oral Daily     Vitamin D3  250 mcg Oral Daily       Data   Recent Labs   Lab 04/21/22  0900 04/21/22  0858 04/20/22  1116 04/19/22  0809     --  136 137   POTASSIUM 3.9  --  4.0 4.0   CHLORIDE 104  --  104 104   CO2 31  --  30 32   BUN 30  --  27 24   CR 0.86  --  0.98 0.92   ANIONGAP 4  --  2* 1*   JUNI 9.3  --  9.4 9.4   GLC 98 97 115* 104*       No results found for this or any previous visit (from the past 24 hour(s)).

## 2022-04-22 NOTE — PLAN OF CARE
DATE & TIME: 4/21/22 1900-2300  Cognitive Concerns/ Orientation: A&Ox4  BEHAVIOR & AGGRESSION TOOL COLOR: Green    ABNL VS/O2: VSS 1L.   MOBILITY: Assist x1 GBW-refuses GB in room. Uses GB in hallway with ambulation.  PAIN MANAGMENT: Chronic back pain managed with prn Flexeril.   DIET: Regular  diet, no straws- aspiration precautions.  BOWEL/BLADDER: Continent; purewick in place while pt in bed.   ABNL LAB: N/A  DRAIN/DEVICES: no IV access  TELEMETRY RHYTHM: N/A  SKIN: RLE dressing change per poc. Area red, excoriated, peeling. Michelle area red, excoriated - barrier applied per poc. Purewick in place with brief open. Educated pt to leave legs open as much as possible.   TESTS/PROCEDURES: none  D/C DAY/GOALS/PLACE: TBD, pending  TCU placement, SW following.  OTHER IMPORTANT INFO: ISO MRSA.  PT/WOC following. Scheduled nebs.

## 2022-04-22 NOTE — PLAN OF CARE
Goal Outcome Evaluation:                    DATE & TIME: 4/21/22-4/22/22 [id number]  Cognitive Concerns/ Orientation: A&Ox4  BEHAVIOR & AGGRESSION TOOL COLOR: Green    ABNL VS/O2: VSS on 1 L O2; low 90s with activity. CPAP on at night, pt requested to take off at 0300, now on 2L NC.  MOBILITY: Assist x1 GBW-refuses GB in room. Uses GB in hallway with ambulation. Not up this shift  PAIN MANAGMENT: Denies pain  DIET: Regular diet, no straws- aspiration precautions.  BOWEL/BLADDER: Continent. Purewick placed during night. Up with walker to bedside commode during day for B&B.  ABNL LAB: N/A  DRAIN/DEVICES: no IV access  TELEMETRY RHYTHM: N/A  SKIN: RLE wound dressing change to be completed by nursing BID per Dr. Bullock. Scattered bruises, +1-2 BLE edema, pulse normal on both feet. Rash to devon area - perineal skin care every shift. L leg edema wrap on.  TESTS/PROCEDURES: none  D/C DAY/GOALS/PLACE: TBD, pending  TCU placement, SW following.  OTHER IMPORTANT INFO: Maintained contact isolation for Hx of MRSA. PT/WOC following. Scheduled nebs.

## 2022-04-22 NOTE — PROGRESS NOTES
Patient is on a 2L NC with SpO2 in the low to mid 90's. Skin intact under oxygen device. BS diminished. All nebs were given as ordered.  Will cont to follow.  4/22/2022  Fawn Eid, RT

## 2022-04-22 NOTE — PLAN OF CARE
Goal Outcome Evaluation:    Plan of Care Reviewed With: patient   Overall Patient Progress: improving  DATE & TIME: 4/22/22-4/22/[id number]  Cognitive Concerns/ Orientation: A&Ox4  BEHAVIOR & AGGRESSION TOOL COLOR: Green    ABNL VS/O2: VSS on 2l , sats mid 80s on RA;  CPAP on at night.  MOBILITY: Assist x1 GBW-refuses GB in room. Uses GB in hallway with ambulation.  PAIN MANAGMENT: tylenol x 1 for right leg pain,  DIET: Regular diet, no straws- aspiration precautions.  BOWEL/BLADDER: Continent. Purewick  during night. Up with walker to bedside commode during day for B&B.  ABNL LAB: N/A  DRAIN/DEVICES: no IV access  TELEMETRY RHYTHM: N/A  SKIN: RLE wound dressing change to be completed by nursing BID per Dr. Bullock. Scattered bruises, +1-2 BLE edema, pulse normal on both feet. Rash to devon area - perineal skin care every shift. Refused  L leg edema wrap, wants to put it on  at bed time..  TESTS/PROCEDURES: none  D/C DAY/GOALS/PLACE: TBD, pending  TCU placement, SW following.  OTHER IMPORTANT INFO: Maintained contact isolation for Hx of MRSA. PT/WOC following. Scheduled nebs.SOB with activity, congested cough, cough syrup x 1

## 2022-04-23 PROCEDURE — 94640 AIRWAY INHALATION TREATMENT: CPT | Mod: 76

## 2022-04-23 PROCEDURE — 999N000157 HC STATISTIC RCP TIME EA 10 MIN

## 2022-04-23 PROCEDURE — 120N000001 HC R&B MED SURG/OB

## 2022-04-23 PROCEDURE — 250N000013 HC RX MED GY IP 250 OP 250 PS 637: Performed by: INTERNAL MEDICINE

## 2022-04-23 PROCEDURE — 250N000009 HC RX 250: Performed by: SURGERY

## 2022-04-23 PROCEDURE — 250N000013 HC RX MED GY IP 250 OP 250 PS 637: Performed by: PHYSICIAN ASSISTANT

## 2022-04-23 PROCEDURE — 250N000013 HC RX MED GY IP 250 OP 250 PS 637: Performed by: HOSPITALIST

## 2022-04-23 PROCEDURE — 94640 AIRWAY INHALATION TREATMENT: CPT

## 2022-04-23 PROCEDURE — 250N000013 HC RX MED GY IP 250 OP 250 PS 637: Performed by: SURGERY

## 2022-04-23 PROCEDURE — 94660 CPAP INITIATION&MGMT: CPT

## 2022-04-23 PROCEDURE — 99231 SBSQ HOSP IP/OBS SF/LOW 25: CPT | Performed by: HOSPITALIST

## 2022-04-23 RX ADMIN — MULTIPLE VITAMINS W/ MINERALS TAB 1 TABLET: TAB at 08:45

## 2022-04-23 RX ADMIN — CYCLOBENZAPRINE HYDROCHLORIDE 5 MG: 5 TABLET, FILM COATED ORAL at 10:40

## 2022-04-23 RX ADMIN — PANTOPRAZOLE SODIUM 40 MG: 40 TABLET, DELAYED RELEASE ORAL at 16:36

## 2022-04-23 RX ADMIN — IPRATROPIUM BROMIDE AND ALBUTEROL SULFATE 3 ML: .5; 3 SOLUTION RESPIRATORY (INHALATION) at 07:54

## 2022-04-23 RX ADMIN — MICONAZOLE NITRATE: 20 POWDER TOPICAL at 20:19

## 2022-04-23 RX ADMIN — FUROSEMIDE 40 MG: 40 TABLET ORAL at 08:45

## 2022-04-23 RX ADMIN — ROPINIROLE HYDROCHLORIDE 0.5 MG: 0.5 TABLET, FILM COATED ORAL at 13:08

## 2022-04-23 RX ADMIN — BUPROPION HYDROCHLORIDE 150 MG: 150 TABLET, EXTENDED RELEASE ORAL at 08:45

## 2022-04-23 RX ADMIN — CITALOPRAM HYDROBROMIDE 40 MG: 20 TABLET, FILM COATED ORAL at 08:44

## 2022-04-23 RX ADMIN — B-COMPLEX W/ C & FOLIC ACID TAB 1 TABLET: TAB at 08:46

## 2022-04-23 RX ADMIN — POTASSIUM CHLORIDE 20 MEQ: 1500 TABLET, EXTENDED RELEASE ORAL at 20:19

## 2022-04-23 RX ADMIN — GABAPENTIN 400 MG: 300 CAPSULE ORAL at 22:21

## 2022-04-23 RX ADMIN — IPRATROPIUM BROMIDE AND ALBUTEROL SULFATE 3 ML: .5; 3 SOLUTION RESPIRATORY (INHALATION) at 15:38

## 2022-04-23 RX ADMIN — SPIRONOLACTONE 25 MG: 25 TABLET ORAL at 08:45

## 2022-04-23 RX ADMIN — ASPIRIN 81 MG CHEWABLE TABLET 81 MG: 81 TABLET CHEWABLE at 20:19

## 2022-04-23 RX ADMIN — ROPINIROLE HYDROCHLORIDE 1 MG: 0.5 TABLET, FILM COATED ORAL at 22:21

## 2022-04-23 RX ADMIN — POTASSIUM CHLORIDE 40 MEQ: 1500 TABLET, EXTENDED RELEASE ORAL at 08:45

## 2022-04-23 RX ADMIN — Medication 1 CAPSULE: at 08:46

## 2022-04-23 RX ADMIN — PANTOPRAZOLE SODIUM 40 MG: 40 TABLET, DELAYED RELEASE ORAL at 06:46

## 2022-04-23 RX ADMIN — Medication 250 MCG: at 08:45

## 2022-04-23 RX ADMIN — ACETAMINOPHEN 650 MG: 325 TABLET, FILM COATED ORAL at 13:13

## 2022-04-23 RX ADMIN — CYCLOBENZAPRINE HYDROCHLORIDE 5 MG: 5 TABLET, FILM COATED ORAL at 23:26

## 2022-04-23 RX ADMIN — Medication 1 CAPSULE: at 22:21

## 2022-04-23 RX ADMIN — Medication 1 PACKET: at 08:46

## 2022-04-23 RX ADMIN — IPRATROPIUM BROMIDE AND ALBUTEROL SULFATE 3 ML: .5; 3 SOLUTION RESPIRATORY (INHALATION) at 11:07

## 2022-04-23 RX ADMIN — CYANOCOBALAMIN TAB 1000 MCG 1000 MCG: 1000 TAB at 08:45

## 2022-04-23 RX ADMIN — FERROUS SULFATE TAB 325 MG (65 MG ELEMENTAL FE) 325 MG: 325 (65 FE) TAB at 08:46

## 2022-04-23 RX ADMIN — SENNOSIDES AND DOCUSATE SODIUM 1 TABLET: 50; 8.6 TABLET ORAL at 08:45

## 2022-04-23 RX ADMIN — GABAPENTIN 400 MG: 300 CAPSULE ORAL at 08:46

## 2022-04-23 RX ADMIN — SIMVASTATIN 40 MG: 40 TABLET, FILM COATED ORAL at 22:21

## 2022-04-23 RX ADMIN — GABAPENTIN 400 MG: 300 CAPSULE ORAL at 16:36

## 2022-04-23 RX ADMIN — MICONAZOLE NITRATE: 20 POWDER TOPICAL at 08:46

## 2022-04-23 RX ADMIN — Medication 1 PACKET: at 20:19

## 2022-04-23 RX ADMIN — ROPINIROLE HYDROCHLORIDE 0.5 MG: 0.5 TABLET, FILM COATED ORAL at 08:46

## 2022-04-23 ASSESSMENT — ACTIVITIES OF DAILY LIVING (ADL)
ADLS_ACUITY_SCORE: 12
ADLS_ACUITY_SCORE: 11
ADLS_ACUITY_SCORE: 12
ADLS_ACUITY_SCORE: 12
ADLS_ACUITY_SCORE: 11
ADLS_ACUITY_SCORE: 11
ADLS_ACUITY_SCORE: 12
ADLS_ACUITY_SCORE: 11
ADLS_ACUITY_SCORE: 12
ADLS_ACUITY_SCORE: 11
ADLS_ACUITY_SCORE: 12
ADLS_ACUITY_SCORE: 11
ADLS_ACUITY_SCORE: 11
ADLS_ACUITY_SCORE: 12
ADLS_ACUITY_SCORE: 12
ADLS_ACUITY_SCORE: 11

## 2022-04-23 NOTE — PROGRESS NOTES
[facility]    Hospitalist Progress Note    Assessment & Plan   Elizabeth Kelley is a 76 year old female with PMHx of MS, chronic BLE lymphedema with venous insufficiency and chronic R leg cellulitis, GERD, hyperlipidemia, and depression who underwent a scheduled excisional debridement of RLE cicumferential venous hypertensive ulceration and application of wound vac per Dr. Bullock on 3/21/22.      Hospitalist service consulted 3/23/22 for AMS ultimately determined to have sepsis 2/2 CAP with respiratory failure. She had been progressing well, completing antibiotic regimen and weaned to room air with discharge planning in progress until 4/3 with worsening systemic evidence of infection and hypoxia, resumed on broad-spectrum antibiotics and imaging suggestive of bilateral pulmonary infiltrates. Also with development of afib with RVR requiring IV medications, transition to IMC. Cardiac workup unrevealing, has since improved from cardiopulmonary perspective and transferred to Med/Surg.     Sepsis dt pneumonia vs aspiration pneumonia, recurrent: Resolved   Acute hypoxic respiratory failure dt above  Acute on chronic diastolic CHF exacerbation: Resolved  * On 3/23 Tmax 101.5, tachycardic in the 110s, hypoxic to 86% on room air. WBC 18.3, procal 0.17. CXR with patchy airspace opacities in the left mid and lower lung, suspicious for pneumonia. Treated with Zosyn for CAP, repeat CXR 3/28 with new right upper lobe infiltrate and basilar infiltrate lateral views. Received diuretics for volume overload, transitioned to Augmentin on 3/30 with plans for 14 day course to treat LE cellulitis.  * 4/3 with fever to 102, uptrending WBC with mildly elevated procalcitonin. C/O SOB, noted to be hypoxic. COVID-19, RSV, Influenza PCR neg. UA neg. CXR on 4/2 neg. LE wound valuated by vascular surgery, no evidence of infection. CTA Chest neg for PE, with bilateral pulmonary infiltrates L>R, most consistent with  infectious etiology. Blood cultures x2 repeated remain negative, Resumed IV Zosyn, Vanco. ID consulted.   * 4/5 Video swallow negative, cleared for reg diet with instructions to sit upright, speech continuing to follow  * 4/6 Weaned to 1-2 L O2.  * Likely recurrent aspiration pneumonia 2/2 hiatal hernia  * Zosyn transitioned to Unasyn on 4/5  * ID consulted. Completed course of abx on 4/8.   * Diuresed with IV Lasix this stay -- changed to oral Lasix (40mg po daily) on 4/15  -- cont Lasix 40mg po daily, spironolactone 25mg daily  -- cont sched duonebs, prn albuterol nebs   -- pulmonary toilet with IS, flutter valve, OOB as able.  Patient continues on 1 to 2 L nasal cannula oxygen.  Likely will need at discharge as well.  -- monitor Is/Os, fluid status  -- prns for cough  -- cont PPI, should remain upright for 2 hrs after meals    Afib with RVR, new diagnosis: Resolved  NSTEMI  * Had RRT on 4/4 for development of afib with RVR, rates 130s-150s in setting of sepsis. EKG with marked ST depression and TWI in lateral leads. Received IV diltiazem bolus with plan to transition to drip, developed three 3-second pauses with conversion to SR. Initiated on heparin drip. Remains in NSR since then. Troponin 17--98  * Lexiscan stress done 4/5/22 was negative for inducible ischemia  * TTE this stay with LVEF 60-65%, no WMAs, no valvular dz, mildly elevated PA pressure  * Seen by cardiology this stay --  ischemic workup neg, no indication for anticoagulation at this time given afib/RVR occurred in the setting of sepsis. If afib recurs, would rec AC and tx with amiodarone rather than diltiazem.     Encephalopathy, multifactorial: Resolved  * Altered mentation noted by nursing during hosptialization. Multifactorial in the setting of fever, narcotic pain medications, suspected underlying BOLIVAR, possible dehydration. Received dose of narcan, but did not significantly change pts mentation immediately, though seemed to improve within the  hour after IVF bolus was near completion. Mental status continues to improved.  * Meds adjusted this stay -- Lyrica stopped, gabapentin increased  * Continues on Wellbutrin, Requip. Keep low threshold to stop meds if confusion recurs  -- cont to reorient  -- suspect some degree of underlying BOLIVAR -- should have sleep study once recovered from surgery    Acute blood loss anemia  Chronic normocytic anemia  * Baseline 10-11. Down to 8.3 post-procedure. No active bleeding, some bleeding from wound bed with dressing changes. Could be dilutional as checked while receiving IVF bolus   * Received 1u PRBCs 3/25, 3/27, 4/6 for Hgb < 7  * NSAIDs (Celebrex) stopped this stay  * Iron studies this stay suggestive of iron deficiency -- started on oral iron supplement  * Hgb stable at 9 this stay    Hematuria: Resolved  Dysuria: Resolved  * Noted on 3/30. Likely dt trauma/irritation from garrison catheter.   * Had been on empiric abx as above. UA 4/1 neg.   * Sx resolved as of 3/31. Garrison removed 4/1.  * Repeat UA on 4/9 was unremarkable.     Chronic BLE lymphedema with venous insufficiency and chronic R leg cellulitis   S/P excisional debridement of RLE  ulceration and application of wound vac (3/21/22)  S/P intraoperative wound examination, wound debridement, and application of myriad 2 layer graft on 3/28  * Routine postop cares per Dr. Bullock including pain control  * Lyrica dc'd 4/5 as above, conts on gabapentin 400mg TID  * On diuretics as above    Low back pain/spasms  * Started 4/19. Exacerbated by lying in bed. Seem to improve when up in chair and ambulation  -- trial of heating pad vs ice pack, low dose flexeril prn    Severe Obesity  *  BMI 50.8 this stay. Follow up with PCP     MS  * Ambulatory at baseline, though has some some weakness in LEs and intermittent UE weakness.     GERD  * Chronic and stable on PPI.     Hyperlipidemia  * Chronic and stable on statin     Depression  * Resumed PTA Celexa, Wellbutrin and Lyrica on  3/26/22 (had been held for a few days given AMS)  * As above, weaned Lyrica off and solely on gabapentin as of 4/5.     RLS  * Chronic and stable on Requip.     FEN: no IVFs, lytes stable, regular diet  DVT Prophylaxis: PCDs  Code Status: Full Code    Disposition: SW coordinating placement. Medical issues remain stable. Discharge per Dr. Bullock once placment found.    Latesha VERA. Ronit    Interval History    Stable overnight.  Complains of some back spasms but overall pain is controlled.  No other new symptoms.  Currently awaiting placement.  Apparently Dr. Bullock is on vacation per patient, she anticipates skin grafting surgery in the next couple of weeks.  Right lower extremity dressing was being changed when patient was seen.  Patient and RN note that RLE wound bed appears to be bleeding a bit more today.  Discussed that we should continue to monitor, contact WOC RN if concern for worsening wound site.    -Data reviewed today: I reviewed all new labs and imaging results over the last 24 hours. I personally reviewed no images or EKG's today.    Physical Exam   Temp: 99  F (37.2  C) Temp src: Oral BP: 138/66 Pulse: 86   Resp: 18 SpO2: 93 % O2 Device: Nasal cannula Oxygen Delivery: 2 LPM  Vitals:    04/18/22 0643 04/21/22 0621 04/22/22 0700   Weight: 127.8 kg (281 lb 12 oz) 122.4 kg (269 lb 13.5 oz) 120 kg (264 lb 8.8 oz)     Vital Signs with Ranges  Temp:  [98.8  F (37.1  C)-99  F (37.2  C)] 99  F (37.2  C)  Pulse:  [81-86] 86  Resp:  [16-18] 18  BP: (105-147)/(64-78) 138/66  FiO2 (%):  [30 %] 30 %  SpO2:  [86 %-95 %] 93 %  I/O last 3 completed shifts:  In: 540 [P.O.:540]  Out: 2700 [Urine:2700]    Constitutional: Resting comfortably, alert and conversing appropriately, NAD  Respiratory: CTAB,  no increased work of breathing  Cardiovascular: HRRR, bilateral lower extremity edema noted, in wraps  GI: S, NT, ND, +BS  Skin/Integumen: warm/dry, RLE not directly examined by me  Other: Calm and pleasant    Medications      Reason anticoagulant not prescribed for atrial fibrillation       - MEDICATION INSTRUCTIONS -       - MEDICATION INSTRUCTIONS -         aspirin  81 mg Oral QPM     buPROPion  150 mg Oral QAM     citalopram  40 mg Oral QAM     cyanocobalamin  1,000 mcg Oral Daily     diosmin-hesperidin 450-50  1 capsule Oral BID     ferrous sulfate  325 mg Oral Daily     furosemide  40 mg Oral Daily     gabapentin  400 mg Oral TID     ipratropium - albuterol 0.5 mg/2.5 mg/3 mL  3 mL Nebulization Q4H While awake     Aleksandr  1 packet Oral BID     miconazole   Topical BID     multivitamin w/minerals  1 tablet Oral Daily     pantoprazole  40 mg Oral BID AC     polyethylene glycol  17 g Oral Daily     potassium chloride ER  20 mEq Oral QPM     potassium chloride ER  40 mEq Oral QAM     rOPINIRole  0.5 mg Oral BID     rOPINIRole  1 mg Oral At Bedtime     senna-docusate  1 tablet Oral BID     simvastatin  40 mg Oral At Bedtime     spironolactone  25 mg Oral Daily     vitamin B complex with vitamin C  1 tablet Oral Daily     Vitamin D3  250 mcg Oral Daily       Data   Recent Labs   Lab 04/21/22  0900 04/21/22  0858 04/20/22  1116 04/19/22  0809     --  136 137   POTASSIUM 3.9  --  4.0 4.0   CHLORIDE 104  --  104 104   CO2 31  --  30 32   BUN 30  --  27 24   CR 0.86  --  0.98 0.92   ANIONGAP 4  --  2* 1*   JUNI 9.3  --  9.4 9.4   GLC 98 97 115* 104*       No results found for this or any previous visit (from the past 24 hour(s)).

## 2022-04-23 NOTE — PLAN OF CARE
DATE & TIME: 4/22/22-4/23/22 [id number]  Cognitive Concerns/ Orientation: A&Ox4; calm, cooperative and pleasant  BEHAVIOR & AGGRESSION TOOL COLOR: Green  ABNL VS/O2: VSS on 2L, [place]. Unable to wean this shift. CPAP overnight.   MOBILITY: Ax1 with walker, refusing GB in room. Up to BSC and  chair this shift.   PAIN MANAGMENT: Denies  DIET: Regular diet, no straws- aspiration precautions.  BOWEL/BLADDER: Continent, up to BSC. Purewick in place overnight per pt request d/t incontinence. 1 BM this shift.   ABNL LAB: N/A  DRAIN/DEVICES:No IV access  TELEMETRY RHYTHM: N/A  SKIN: RLE wound dressing change to be completed by nursing BID per Dr. Bullock-- pt refused RLE dressing change this shift d/t previous dressing change being done later in the day, stated she would like to wait until AM to change. Scattered bruises, +1-2 BLE edema, pulse normal on both feet. Rash to devon area - perineal skin care every shift per WOC orders.   TESTS/PROCEDURES: N/A  D/C DAY/GOALS/PLACE: TBD, pending TCU placement, SW following.  OTHER IMPORTANT INFO: Maintained contact isolation for Hx of MRSA. PT/WOC following. Scheduled nebs. SOB with activity, infrequent congested cough, PRN guaifenesin given x1. LS: diminished.

## 2022-04-23 NOTE — PROGRESS NOTES
Care Management Follow Up    Length of Stay (days): 32    Expected Discharge Date: 04/26/2022     Concerns to be Addressed:       Patient plan of care discussed at interdisciplinary rounds: Yes    Anticipated Discharge Disposition:  (Long term placement in a SNF or [facility])     Anticipated Discharge Services: None  Anticipated Discharge DME:      Patient/family educated on Medicare website which has current facility and service quality ratings:    Education Provided on the Discharge Plan:    Patient/Family in Agreement with the Plan: yes    Referrals Placed by CM/SW:    Private pay costs discussed:     Additional Information:  A co-worker has contacted patient's primary insurance plan [employer].   Per their customer service this plan is under [facility] so we will focus referrals to TCU's which are in the [facility] network.   Writer spoke with BERNADETTE Mccormick  at [facility] today.  The TCU may have  a bariatric room available early next week and she has requested a referral be sent over for their review.  Referral sent via [facility] B[facility].  Writer will update patient and daughter if [facility] can offer a vacancy.        KAYLA PimentelSW

## 2022-04-23 NOTE — PLAN OF CARE
Goal Outcome Evaluation:    Plan of Care Reviewed With: patient     Overall Patient Progress: improving       DATE & TIME: 4/23/22 2284-1326  Cognitive Concerns/ Orientation: A&Ox4; calm, cooperative and pleasant  BEHAVIOR & AGGRESSION TOOL COLOR: Green  ABNL VS/O2: T max 99.6 on 2L, [place]. Sats 88- 89 % on RA,CPAP overnight.   MOBILITY: Ax1 with walker, refusing GB in room. Up to BSC and  chair this shift.   PAIN MANAGMENT: back spasms managed with Flexeril.  DIET: Regular diet, no straws- aspiration precautions.  BOWEL/BLADDER: Continent, up to BSC. 2-3 loose BMs this shift patient on stool softeners.   ABNL LAB: N/A  DRAIN/DEVICES:No IV access  TELEMETRY RHYTHM: N/A  SKIN: RLE wound dressing changes BID,noticed more  Bloody drainage  today, 2-3+ RLE edema,  rash to devon area - perineal skin care every shift per WOC orders.   TESTS/PROCEDURES: N/A  D/C DAY/GOALS/PLACE: TBD, pending TCU placement, SW following.  OTHER IMPORTANT INFO: Maintained contact isolation for Hx of MRSA. PT/WOC following. Scheduled nebs. SOB with activity, infrequent congested cough. Given tylenol x 1 for low grade temp, MD aware .

## 2022-04-24 PROCEDURE — 94640 AIRWAY INHALATION TREATMENT: CPT | Mod: 76

## 2022-04-24 PROCEDURE — 250N000009 HC RX 250: Performed by: SURGERY

## 2022-04-24 PROCEDURE — 999N000157 HC STATISTIC RCP TIME EA 10 MIN

## 2022-04-24 PROCEDURE — 250N000013 HC RX MED GY IP 250 OP 250 PS 637: Performed by: INTERNAL MEDICINE

## 2022-04-24 PROCEDURE — 94640 AIRWAY INHALATION TREATMENT: CPT

## 2022-04-24 PROCEDURE — 99232 SBSQ HOSP IP/OBS MODERATE 35: CPT | Performed by: HOSPITALIST

## 2022-04-24 PROCEDURE — 120N000001 HC R&B MED SURG/OB

## 2022-04-24 PROCEDURE — 250N000013 HC RX MED GY IP 250 OP 250 PS 637: Performed by: SURGERY

## 2022-04-24 PROCEDURE — 250N000013 HC RX MED GY IP 250 OP 250 PS 637: Performed by: PHYSICIAN ASSISTANT

## 2022-04-24 PROCEDURE — 94660 CPAP INITIATION&MGMT: CPT

## 2022-04-24 PROCEDURE — 250N000013 HC RX MED GY IP 250 OP 250 PS 637: Performed by: HOSPITALIST

## 2022-04-24 RX ADMIN — Medication 1 PACKET: at 21:03

## 2022-04-24 RX ADMIN — Medication 1 PACKET: at 09:01

## 2022-04-24 RX ADMIN — ROPINIROLE HYDROCHLORIDE 0.5 MG: 0.5 TABLET, FILM COATED ORAL at 08:59

## 2022-04-24 RX ADMIN — POTASSIUM CHLORIDE 40 MEQ: 1500 TABLET, EXTENDED RELEASE ORAL at 08:58

## 2022-04-24 RX ADMIN — Medication 1 CAPSULE: at 21:59

## 2022-04-24 RX ADMIN — FERROUS SULFATE TAB 325 MG (65 MG ELEMENTAL FE) 325 MG: 325 (65 FE) TAB at 08:58

## 2022-04-24 RX ADMIN — Medication 250 MCG: at 08:58

## 2022-04-24 RX ADMIN — B-COMPLEX W/ C & FOLIC ACID TAB 1 TABLET: TAB at 08:59

## 2022-04-24 RX ADMIN — ASPIRIN 81 MG CHEWABLE TABLET 81 MG: 81 TABLET CHEWABLE at 20:58

## 2022-04-24 RX ADMIN — IPRATROPIUM BROMIDE AND ALBUTEROL SULFATE 3 ML: .5; 3 SOLUTION RESPIRATORY (INHALATION) at 11:22

## 2022-04-24 RX ADMIN — CYANOCOBALAMIN TAB 1000 MCG 1000 MCG: 1000 TAB at 08:58

## 2022-04-24 RX ADMIN — SIMVASTATIN 40 MG: 40 TABLET, FILM COATED ORAL at 21:56

## 2022-04-24 RX ADMIN — ROPINIROLE HYDROCHLORIDE 0.5 MG: 0.5 TABLET, FILM COATED ORAL at 12:51

## 2022-04-24 RX ADMIN — BUPROPION HYDROCHLORIDE 150 MG: 150 TABLET, EXTENDED RELEASE ORAL at 08:58

## 2022-04-24 RX ADMIN — CITALOPRAM HYDROBROMIDE 40 MG: 20 TABLET, FILM COATED ORAL at 08:58

## 2022-04-24 RX ADMIN — IPRATROPIUM BROMIDE AND ALBUTEROL SULFATE 3 ML: .5; 3 SOLUTION RESPIRATORY (INHALATION) at 20:41

## 2022-04-24 RX ADMIN — GABAPENTIN 400 MG: 300 CAPSULE ORAL at 21:56

## 2022-04-24 RX ADMIN — GABAPENTIN 400 MG: 300 CAPSULE ORAL at 15:58

## 2022-04-24 RX ADMIN — ROPINIROLE HYDROCHLORIDE 1 MG: 0.5 TABLET, FILM COATED ORAL at 21:56

## 2022-04-24 RX ADMIN — PANTOPRAZOLE SODIUM 40 MG: 40 TABLET, DELAYED RELEASE ORAL at 06:51

## 2022-04-24 RX ADMIN — FUROSEMIDE 40 MG: 40 TABLET ORAL at 08:58

## 2022-04-24 RX ADMIN — POTASSIUM CHLORIDE 20 MEQ: 1500 TABLET, EXTENDED RELEASE ORAL at 20:59

## 2022-04-24 RX ADMIN — MULTIPLE VITAMINS W/ MINERALS TAB 1 TABLET: TAB at 08:58

## 2022-04-24 RX ADMIN — IPRATROPIUM BROMIDE AND ALBUTEROL SULFATE 3 ML: .5; 3 SOLUTION RESPIRATORY (INHALATION) at 16:54

## 2022-04-24 RX ADMIN — MICONAZOLE NITRATE: 20 POWDER TOPICAL at 09:00

## 2022-04-24 RX ADMIN — PANTOPRAZOLE SODIUM 40 MG: 40 TABLET, DELAYED RELEASE ORAL at 15:58

## 2022-04-24 RX ADMIN — Medication 1 CAPSULE: at 08:58

## 2022-04-24 RX ADMIN — SPIRONOLACTONE 25 MG: 25 TABLET ORAL at 08:57

## 2022-04-24 RX ADMIN — GABAPENTIN 400 MG: 300 CAPSULE ORAL at 08:58

## 2022-04-24 ASSESSMENT — ACTIVITIES OF DAILY LIVING (ADL)
ADLS_ACUITY_SCORE: 16
ADLS_ACUITY_SCORE: 10
ADLS_ACUITY_SCORE: 16
ADLS_ACUITY_SCORE: 12
ADLS_ACUITY_SCORE: 10
ADLS_ACUITY_SCORE: 16
ADLS_ACUITY_SCORE: 12
ADLS_ACUITY_SCORE: 10
ADLS_ACUITY_SCORE: 10
ADLS_ACUITY_SCORE: 14
ADLS_ACUITY_SCORE: 16
ADLS_ACUITY_SCORE: 16
ADLS_ACUITY_SCORE: 14
ADLS_ACUITY_SCORE: 16
ADLS_ACUITY_SCORE: 16
ADLS_ACUITY_SCORE: 12
ADLS_ACUITY_SCORE: 16
ADLS_ACUITY_SCORE: 12
ADLS_ACUITY_SCORE: 10
ADLS_ACUITY_SCORE: 16
ADLS_ACUITY_SCORE: 16

## 2022-04-24 NOTE — PLAN OF CARE
Goal Outcome Evaluation:    Plan of Care Reviewed With: patient   Overall Patient Progress: no change   DATE & TIME: -4/24/22 1249-0529  Cognitive Concerns/ Orientation: A&Ox4; calm, cooperative and pleasant  BEHAVIOR & AGGRESSION TOOL COLOR: Green  ABNL VS/O2: VSS on1L NC, on CPAP overnight.   MOBILITY: Ax1 with walker, refusing GB in room. Up to BSC.  PAIN MANAGMENT: Denies .   DIET: Regular diet, no straws- aspiration precautions.  BOWEL/BLADDER: Continent, up to BSC,  held scheduled  Senna d/t loose stools yesterday Purewick when  in bed , up in chair for meals  ABNL LAB: N/A  DRAIN/DEVICES: No IV access  TELEMETRY RHYTHM: N/A  SKIN: RLE wound dressing changed this shift per WOC orders. Wound cares performed to devon-area per POC. +1-3 edema in BLE, LE elevated on pillows.    TESTS/PROCEDURES: N/A  D/C DAY/GOALS/PLACE: TBD, pending TCU placement, SW following.  OTHER IMPORTANT INFO: Contact precautions maintained for hx of MRSA. PT/WOC following. Scheduled nebs,SOB with activity, infrequent congested cough. Patient feels stronger today.

## 2022-04-24 NOTE — PLAN OF CARE
DATE & TIME: 4/23/22-4/24/22 1398-5860  Cognitive Concerns/ Orientation: A&Ox4; calm, cooperative and pleasant  BEHAVIOR & AGGRESSION TOOL COLOR: Green  ABNL VS/O2: VSS on 2L NC, on CPAP overnight.   MOBILITY: Ax1 with walker, refusing GB in room. Up to BSC.  PAIN MANAGMENT: Denies pain other than back spasms with movement, refused any intervention, no pain at rest.   DIET: Regular diet, no straws- aspiration precautions.  BOWEL/BLADDER: Continent, up to BSC, held scheduled PM Senna d/t loose stools throughout the day. Purewick in place overnight d/t incontinence.   ABNL LAB: N/A  DRAIN/DEVICES: No IV access  TELEMETRY RHYTHM: N/A  SKIN: RLE wound dressing changed this shift per WOC orders. Wound cares performed to devon-area per POC. +1-3 edema in BLE, RLE elevated on pillows in bed.    TESTS/PROCEDURES: N/A  D/C DAY/GOALS/PLACE: TBD, pending TCU placement, SW following.  OTHER IMPORTANT INFO: Contact precautions maintained for hx of MRSA. PT/WOC following. Scheduled nebs, refused PM nebs. SOB with activity, infrequent congested cough.

## 2022-04-24 NOTE — PROGRESS NOTES
[facility]    Hospitalist Progress Note    Assessment & Plan   Elizabeth Kelley is a 76 year old female with PMHx of MS, chronic BLE lymphedema with venous insufficiency and chronic R leg cellulitis, GERD, hyperlipidemia, and depression who underwent a scheduled excisional debridement of RLE cicumferential venous hypertensive ulceration and application of wound vac per Dr. Bullock on 3/21/22.      Hospitalist service consulted 3/23/22 for AMS ultimately determined to have sepsis 2/2 CAP with respiratory failure. She had been progressing well, completing antibiotic regimen and weaned to room air with discharge planning in progress until 4/3 with worsening systemic evidence of infection and hypoxia, resumed on broad-spectrum antibiotics and imaging suggestive of bilateral pulmonary infiltrates. Also with development of afib with RVR requiring IV medications, transition to IMC. Cardiac workup unrevealing, has since improved from cardiopulmonary perspective and transferred to Med/Surg.     Sepsis dt pneumonia vs aspiration pneumonia, recurrent: Resolved   Acute hypoxic respiratory failure dt above  Acute on chronic diastolic CHF exacerbation: Resolved  * On 3/23 Tmax 101.5, tachycardic in the 110s, hypoxic to 86% on room air. WBC 18.3, procal 0.17. CXR with patchy airspace opacities in the left mid and lower lung, suspicious for pneumonia. Treated with Zosyn for CAP, repeat CXR 3/28 with new right upper lobe infiltrate and basilar infiltrate lateral views. Received diuretics for volume overload, transitioned to Augmentin on 3/30 with plans for 14 day course to treat LE cellulitis.  * 4/3 with fever to 102, uptrending WBC with mildly elevated procalcitonin. C/O SOB, noted to be hypoxic. COVID-19, RSV, Influenza PCR neg. UA neg. CXR on 4/2 neg. LE wound valuated by vascular surgery, no evidence of infection. CTA Chest neg for PE, with bilateral pulmonary infiltrates L>R, most consistent with  infectious etiology. Blood cultures x2 repeated remain negative, Resumed IV Zosyn, Vanco. ID consulted.   * 4/5 Video swallow negative, cleared for reg diet with instructions to sit upright, speech continuing to follow  * 4/6 Weaned to 1-2 L O2.  * Likely recurrent aspiration pneumonia 2/2 hiatal hernia  * Zosyn transitioned to Unasyn on 4/5  * ID consulted. Completed course of abx on 4/8.   * Diuresed with IV Lasix this stay -- changed to oral Lasix (40mg po daily) on 4/15  -- cont Lasix 40mg po daily, spironolactone 25mg daily  -- cont sched duonebs, prn albuterol nebs   -- pulmonary toilet with IS, flutter valve, OOB as able.  Patient continues on 1 to 2 L nasal cannula oxygen.  Likely will need at discharge as well.  -- monitor Is/Os, fluid status  -- prns for cough  -- cont PPI, should remain upright for 2 hrs after meals    Afib with RVR, new diagnosis: Resolved  NSTEMI  * Had RRT on 4/4 for development of afib with RVR, rates 130s-150s in setting of sepsis. EKG with marked ST depression and TWI in lateral leads. Received IV diltiazem bolus with plan to transition to drip, developed three 3-second pauses with conversion to SR. Initiated on heparin drip. Remains in NSR since then. Troponin 17--98  * Lexiscan stress done 4/5/22 was negative for inducible ischemia  * TTE this stay with LVEF 60-65%, no WMAs, no valvular dz, mildly elevated PA pressure  * Seen by cardiology this stay --  ischemic workup neg, no indication for anticoagulation at this time given afib/RVR occurred in the setting of sepsis. If afib recurs, would rec AC and tx with amiodarone rather than diltiazem.     Encephalopathy, multifactorial: Resolved  * Altered mentation noted by nursing during hosptialization. Multifactorial in the setting of fever, narcotic pain medications, suspected underlying BOLIVAR, possible dehydration. Received dose of narcan, but did not significantly change pts mentation immediately, though seemed to improve within the  hour after IVF bolus was near completion. Mental status continues to improved.  * Meds adjusted this stay -- Lyrica stopped, gabapentin increased  * Continues on Wellbutrin, Requip. Keep low threshold to stop meds if confusion recurs  -- cont to reorient  -- suspect some degree of underlying BOLIVAR -- should have sleep study once recovered from surgery    Acute blood loss anemia  Chronic normocytic anemia  * Baseline 10-11. Down to 8.3 post-procedure. No active bleeding, some bleeding from wound bed with dressing changes. Could be dilutional as checked while receiving IVF bolus   * Received 1u PRBCs 3/25, 3/27, 4/6 for Hgb < 7  * NSAIDs (Celebrex) stopped this stay  * Iron studies this stay suggestive of iron deficiency -- started on oral iron supplement  * Hgb stable at 9 this stay    Hematuria: Resolved  Dysuria: Resolved  * Noted on 3/30. Likely dt trauma/irritation from garrison catheter.   * Had been on empiric abx as above. UA 4/1 neg.   * Sx resolved as of 3/31. Garrison removed 4/1.  * Repeat UA on 4/9 was unremarkable.     Chronic BLE lymphedema with venous insufficiency and chronic R leg cellulitis   S/P excisional debridement of RLE  ulceration and application of wound vac (3/21/22)  S/P intraoperative wound examination, wound debridement, and application of myriad 2 layer graft on 3/28  * Routine postop cares per Dr. Bullock including pain control  * Lyrica dc'd 4/5 as above, conts on gabapentin 400mg TID  * On diuretics as above    Low back pain/spasms  * Started 4/19. Exacerbated by lying in bed. Seem to improve when up in chair and ambulation  -- trial of heating pad vs ice pack, low dose flexeril prn    Severe Obesity  *  BMI 50.8 this stay. Follow up with PCP     MS  * Ambulatory at baseline, though has some some weakness in LEs and intermittent UE weakness.     GERD  * Chronic and stable on PPI.     Hyperlipidemia  * Chronic and stable on statin     Depression  * Resumed PTA Celexa, Wellbutrin and Lyrica on  3/26/22 (had been held for a few days given AMS)  * As above, weaned Lyrica off and solely on gabapentin as of 4/5.     RLS  * Chronic and stable on Requip.     FEN: no IVFs, lytes stable, regular diet  DVT Prophylaxis: PCDs  Code Status: Full Code    Disposition: SW coordinating placement. Medical issues remain stable. Discharge per Dr. Bullock once placment found.    Latesha R. Ovian    Interval History    Stable overnight.  Patient notes not feeling very well this morning.  Feeling a bit more short of breath, has not yet received her nebulizer treatment.  Having generalized body aches and feeling tired.  No fever today but yesterday temp went up to 99.7 which she states is high for her.    -Data reviewed today: I reviewed all new labs and imaging results over the last 24 hours. I personally reviewed no images or EKG's today.    Physical Exam   Temp: 98.9  F (37.2  C) Temp src: Oral BP: 135/61 Pulse: 77   Resp: 18 SpO2: 92 % O2 Device: Nasal cannula Oxygen Delivery: 1 LPM  Vitals:    04/18/22 0643 04/21/22 0621 04/22/22 0700   Weight: 127.8 kg (281 lb 12 oz) 122.4 kg (269 lb 13.5 oz) 120 kg (264 lb 8.8 oz)     Vital Signs with Ranges  Temp:  [98.7  F (37.1  C)-98.9  F (37.2  C)] 98.9  F (37.2  C)  Pulse:  [77-89] 77  Resp:  [18] 18  BP: (135-145)/(56-68) 135/61  SpO2:  [92 %-93 %] 92 %  I/O last 3 completed shifts:  In: 240 [P.O.:240]  Out: -     Constitutional: Resting comfortably, alert and conversing appropriately, NAD  Respiratory: Few wheezes bilaterally, nonlabored breathing  Cardiovascular: HRRR, bilateral lower extremity edema noted, in wraps  GI: S, NT, ND, +BS  Skin/Integumen: warm/dry, RLE not directly examined by me  Other: Calm and pleasant    Medications     Reason anticoagulant not prescribed for atrial fibrillation       - MEDICATION INSTRUCTIONS -       - MEDICATION INSTRUCTIONS -         aspirin  81 mg Oral QPM     buPROPion  150 mg Oral QAM     citalopram  40 mg Oral QAM     cyanocobalamin  1,000  mcg Oral Daily     diosmin-hesperidin 450-50  1 capsule Oral BID     ferrous sulfate  325 mg Oral Daily     furosemide  40 mg Oral Daily     gabapentin  400 mg Oral TID     ipratropium - albuterol 0.5 mg/2.5 mg/3 mL  3 mL Nebulization Q4H While awake     Aleksandr  1 packet Oral BID     miconazole   Topical BID     multivitamin w/minerals  1 tablet Oral Daily     pantoprazole  40 mg Oral BID AC     polyethylene glycol  17 g Oral Daily     potassium chloride ER  20 mEq Oral QPM     potassium chloride ER  40 mEq Oral QAM     rOPINIRole  0.5 mg Oral BID     rOPINIRole  1 mg Oral At Bedtime     senna-docusate  1 tablet Oral BID     simvastatin  40 mg Oral At Bedtime     spironolactone  25 mg Oral Daily     vitamin B complex with vitamin C  1 tablet Oral Daily     Vitamin D3  250 mcg Oral Daily       Data   Recent Labs   Lab 04/21/22  0900 04/21/22  0858 04/20/22  1116 04/19/22  0809     --  136 137   POTASSIUM 3.9  --  4.0 4.0   CHLORIDE 104  --  104 104   CO2 31  --  30 32   BUN 30  --  27 24   CR 0.86  --  0.98 0.92   ANIONGAP 4  --  2* 1*   JUNI 9.3  --  9.4 9.4   GLC 98 97 115* 104*       No results found for this or any previous visit (from the past 24 hour(s)).

## 2022-04-25 ENCOUNTER — APPOINTMENT (OUTPATIENT)
Dept: PHYSICAL THERAPY | Facility: CLINIC | Age: 77
DRG: 264 | End: 2022-04-25
Attending: SURGERY
Payer: COMMERCIAL

## 2022-04-25 PROCEDURE — 97530 THERAPEUTIC ACTIVITIES: CPT | Mod: GP

## 2022-04-25 PROCEDURE — 250N000013 HC RX MED GY IP 250 OP 250 PS 637: Performed by: SURGERY

## 2022-04-25 PROCEDURE — 250N000013 HC RX MED GY IP 250 OP 250 PS 637: Performed by: PHYSICIAN ASSISTANT

## 2022-04-25 PROCEDURE — 250N000009 HC RX 250: Performed by: SURGERY

## 2022-04-25 PROCEDURE — 250N000013 HC RX MED GY IP 250 OP 250 PS 637: Performed by: HOSPITALIST

## 2022-04-25 PROCEDURE — 97110 THERAPEUTIC EXERCISES: CPT | Mod: GP

## 2022-04-25 PROCEDURE — 120N000001 HC R&B MED SURG/OB

## 2022-04-25 PROCEDURE — 94660 CPAP INITIATION&MGMT: CPT

## 2022-04-25 PROCEDURE — 250N000013 HC RX MED GY IP 250 OP 250 PS 637: Performed by: STUDENT IN AN ORGANIZED HEALTH CARE EDUCATION/TRAINING PROGRAM

## 2022-04-25 PROCEDURE — 99232 SBSQ HOSP IP/OBS MODERATE 35: CPT | Performed by: STUDENT IN AN ORGANIZED HEALTH CARE EDUCATION/TRAINING PROGRAM

## 2022-04-25 PROCEDURE — 94640 AIRWAY INHALATION TREATMENT: CPT | Mod: 76

## 2022-04-25 PROCEDURE — 94640 AIRWAY INHALATION TREATMENT: CPT

## 2022-04-25 PROCEDURE — 999N000157 HC STATISTIC RCP TIME EA 10 MIN

## 2022-04-25 PROCEDURE — 250N000013 HC RX MED GY IP 250 OP 250 PS 637: Performed by: INTERNAL MEDICINE

## 2022-04-25 RX ORDER — CYCLOBENZAPRINE HCL 10 MG
10 TABLET ORAL EVERY 8 HOURS PRN
Status: DISCONTINUED | OUTPATIENT
Start: 2022-04-25 | End: 2022-04-29 | Stop reason: HOSPADM

## 2022-04-25 RX ADMIN — GABAPENTIN 400 MG: 300 CAPSULE ORAL at 09:26

## 2022-04-25 RX ADMIN — PANTOPRAZOLE SODIUM 40 MG: 40 TABLET, DELAYED RELEASE ORAL at 16:00

## 2022-04-25 RX ADMIN — MULTIPLE VITAMINS W/ MINERALS TAB 1 TABLET: TAB at 09:26

## 2022-04-25 RX ADMIN — Medication 1 PACKET: at 09:27

## 2022-04-25 RX ADMIN — SIMVASTATIN 40 MG: 40 TABLET, FILM COATED ORAL at 22:25

## 2022-04-25 RX ADMIN — ASPIRIN 81 MG CHEWABLE TABLET 81 MG: 81 TABLET CHEWABLE at 20:10

## 2022-04-25 RX ADMIN — PANTOPRAZOLE SODIUM 40 MG: 40 TABLET, DELAYED RELEASE ORAL at 09:32

## 2022-04-25 RX ADMIN — POTASSIUM CHLORIDE 40 MEQ: 1500 TABLET, EXTENDED RELEASE ORAL at 09:26

## 2022-04-25 RX ADMIN — FERROUS SULFATE TAB 325 MG (65 MG ELEMENTAL FE) 325 MG: 325 (65 FE) TAB at 09:26

## 2022-04-25 RX ADMIN — ROPINIROLE HYDROCHLORIDE 1 MG: 0.5 TABLET, FILM COATED ORAL at 20:11

## 2022-04-25 RX ADMIN — CYANOCOBALAMIN TAB 1000 MCG 1000 MCG: 1000 TAB at 09:26

## 2022-04-25 RX ADMIN — Medication 1 CAPSULE: at 22:25

## 2022-04-25 RX ADMIN — FUROSEMIDE 40 MG: 40 TABLET ORAL at 09:27

## 2022-04-25 RX ADMIN — ROPINIROLE HYDROCHLORIDE 0.5 MG: 0.5 TABLET, FILM COATED ORAL at 13:10

## 2022-04-25 RX ADMIN — IPRATROPIUM BROMIDE AND ALBUTEROL SULFATE 3 ML: .5; 3 SOLUTION RESPIRATORY (INHALATION) at 07:31

## 2022-04-25 RX ADMIN — IPRATROPIUM BROMIDE AND ALBUTEROL SULFATE 3 ML: .5; 3 SOLUTION RESPIRATORY (INHALATION) at 11:52

## 2022-04-25 RX ADMIN — GABAPENTIN 400 MG: 300 CAPSULE ORAL at 16:00

## 2022-04-25 RX ADMIN — GUAIFENESIN 10 ML: 100 SOLUTION ORAL at 20:11

## 2022-04-25 RX ADMIN — Medication 250 MCG: at 09:26

## 2022-04-25 RX ADMIN — POTASSIUM CHLORIDE 20 MEQ: 1500 TABLET, EXTENDED RELEASE ORAL at 20:10

## 2022-04-25 RX ADMIN — BENZONATATE 100 MG: 100 CAPSULE ORAL at 18:19

## 2022-04-25 RX ADMIN — Medication 1 CAPSULE: at 09:32

## 2022-04-25 RX ADMIN — CYCLOBENZAPRINE 10 MG: 10 TABLET, FILM COATED ORAL at 16:09

## 2022-04-25 RX ADMIN — Medication 1 PACKET: at 22:25

## 2022-04-25 RX ADMIN — B-COMPLEX W/ C & FOLIC ACID TAB 1 TABLET: TAB at 09:26

## 2022-04-25 RX ADMIN — MICONAZOLE NITRATE: 20 POWDER TOPICAL at 09:27

## 2022-04-25 RX ADMIN — SPIRONOLACTONE 25 MG: 25 TABLET ORAL at 09:27

## 2022-04-25 RX ADMIN — GABAPENTIN 400 MG: 300 CAPSULE ORAL at 22:25

## 2022-04-25 RX ADMIN — ROPINIROLE HYDROCHLORIDE 0.5 MG: 0.5 TABLET, FILM COATED ORAL at 09:07

## 2022-04-25 RX ADMIN — SENNOSIDES AND DOCUSATE SODIUM 1 TABLET: 50; 8.6 TABLET ORAL at 09:32

## 2022-04-25 RX ADMIN — MICONAZOLE NITRATE: 20 POWDER TOPICAL at 22:25

## 2022-04-25 RX ADMIN — BUPROPION HYDROCHLORIDE 150 MG: 150 TABLET, EXTENDED RELEASE ORAL at 09:26

## 2022-04-25 RX ADMIN — CITALOPRAM HYDROBROMIDE 40 MG: 20 TABLET, FILM COATED ORAL at 09:26

## 2022-04-25 ASSESSMENT — ACTIVITIES OF DAILY LIVING (ADL)
ADLS_ACUITY_SCORE: 10
ADLS_ACUITY_SCORE: 12
ADLS_ACUITY_SCORE: 10
ADLS_ACUITY_SCORE: 12
ADLS_ACUITY_SCORE: 10
ADLS_ACUITY_SCORE: 12
ADLS_ACUITY_SCORE: 10
ADLS_ACUITY_SCORE: 12
ADLS_ACUITY_SCORE: 10
ADLS_ACUITY_SCORE: 12
ADLS_ACUITY_SCORE: 12
ADLS_ACUITY_SCORE: 10
ADLS_ACUITY_SCORE: 10
ADLS_ACUITY_SCORE: 12
ADLS_ACUITY_SCORE: 10
ADLS_ACUITY_SCORE: 10
ADLS_ACUITY_SCORE: 12

## 2022-04-25 NOTE — PLAN OF CARE
Goal Outcome Evaluation:                DATE & TIME:4/25/22 1500  Cognitive Concerns/ Orientation: A&Ox4; calm, cooperative and pleasant  BEHAVIOR & AGGRESSION TOOL COLOR: Green  ABNL VS/O2: VSS on 1L NC, Pt drops to 88% on RA shortly after O2 is removed.  MOBILITY: Ax1 with walker, refusing GB in room. Up to BSC and walked in halls today. Has been up in recliner chair good part of day.  PAIN MANAGMENT: Intermittent manuel lower back pain which worsens with sitting or laying in bed.  DIET: Regular diet, no straws- aspiration precautions. Appetite good.  BOWEL/BLADDER: Occ incontinent of urine with coughing, up to BSC, needs assistance to wipe and clean herself up.  ABNL LAB: No values today  DRAIN/DEVICES: No IV access  TELEMETRY RHYTHM: N/A  SKIN: Wound care performed per the orders. Dr. Bullock very pleased with the healing progress. Michelle area reddened, protocol followed for that skin care.  TESTS/PROCEDURES: N/A  D/C DAY/GOALS/PLACE: TBD, pending TCU placement, SW following.  OTHER IMPORTANT INFO: Contact precautions maintained for hx of MRSA. PT/WOC following. Scheduled nebs,SOB with activity, frequent congested nonprod cough.

## 2022-04-25 NOTE — PROGRESS NOTES
Pain resolved  Right leg wound near completely epithelialized since March 21 with BID Vashe HOCL dressing changes - will not kathrine skin graft  Can plan to discontinue home with outpatient dressing changes at [facility] 2x week and transition to 1x/week as able -   Reviewed with patient and nursing staff -   Continue Aleksandr and MPFF Vein Formula, Vit C and B vitamins at discharge -   Will plan to place a 2 layer wrap prior to discharge Tues/Wed when medically fit - still on O2 and desats to 90% on RA -   Ara

## 2022-04-25 NOTE — PLAN OF CARE
DATE & TIME:4/24/22-4/25/22 2205-6581  Cognitive Concerns/ Orientation: A&Ox4; calm, cooperative and pleasant  BEHAVIOR & AGGRESSION TOOL COLOR: Green  ABNL VS/O2: VSS on 1L NC, on CPAP overnight.   MOBILITY: Ax1 with walker, refusing GB in room. Up to BSC.  PAIN MANAGMENT: Denies .   DIET: Regular diet, no straws- aspiration precautions.  BOWEL/BLADDER: Continent, up to BSC,  held scheduled  Senna d/t loose stools yesterday Purewick in place, up in chair for meals  ABNL LAB: N/A  DRAIN/DEVICES: No IV access  TELEMETRY RHYTHM: N/A  SKIN: Wound care performed per the orders.    TESTS/PROCEDURES: N/A  D/C DAY/GOALS/PLACE: TBD, pending TCU placement, SW following.  OTHER IMPORTANT INFO: Contact precautions maintained for hx of MRSA. PT/WOC following. Scheduled nebs,SOB with activity, infrequent congested cough..

## 2022-04-25 NOTE — PROGRESS NOTES
[facility]    Hospitalist Progress Note    Date of Service: 04/25/2022    Assessment & Plan      Elizabeth Kelley is a 76 year old female with PMHx of MS, chronic BLE lymphedema with venous insufficiency and chronic R leg cellulitis, GERD, hyperlipidemia, and depression who underwent a scheduled excisional debridement of RLE cicumferential venous hypertensive ulceration and application of wound vac per Dr. Bullock on 3/21/22.      Hospitalist service consulted 3/23/22 for AMS ultimately determined to have sepsis 2/2 CAP with respiratory failure. She had been progressing well, completing antibiotic regimen and weaned to room air with discharge planning in progress until 4/3 with worsening systemic evidence of infection and hypoxia, resumed on broad-spectrum antibiotics and imaging suggestive of bilateral pulmonary infiltrates. Also with development of afib with RVR requiring IV medications, transition to IMC. Cardiac workup unrevealing, has since improved from cardiopulmonary perspective and transferred to Med/Surg.     Sepsis dt pneumonia vs aspiration pneumonia, recurrent: Resolved   Acute hypoxic respiratory failure dt above  Acute on chronic diastolic CHF exacerbation: Resolved  * On 3/23 Tmax 101.5, tachycardic in the 110s, hypoxic to 86% on room air. WBC 18.3, procal 0.17. CXR with patchy airspace opacities in the left mid and lower lung, suspicious for pneumonia. Treated with Zosyn for CAP, repeat CXR 3/28 with new right upper lobe infiltrate and basilar infiltrate lateral views. Received diuretics for volume overload, transitioned to Augmentin on 3/30 with plans for 14 day course to treat LE cellulitis.  * 4/3 with fever to 102, uptrending WBC with mildly elevated procalcitonin. C/O SOB, noted to be hypoxic. COVID-19, RSV, Influenza PCR neg. UA neg. CXR on 4/2 neg. LE wound valuated by vascular surgery, no evidence of infection. CTA Chest neg for PE, with bilateral pulmonary infiltrates  L>R, most consistent with infectious etiology. Blood cultures x2 repeated remain negative, Resumed IV Zosyn, Vanco. ID consulted.   * 4/5 Video swallow negative, cleared for reg diet with instructions to sit upright, speech continuing to follow  * 4/6 Weaned to 1-2 L O2.  * Likely recurrent aspiration pneumonia 2/2 hiatal hernia  * Zosyn transitioned to Unasyn on 4/5  * ID consulted. Completed course of abx on 4/8.   * Diuresed with IV Lasix this stay -- changed to oral Lasix (40mg po daily) on 4/15  -- cont Lasix 40mg po daily, spironolactone 25mg daily  -- cont sched duonebs, prn albuterol nebs   -- pulmonary toilet with IS, flutter valve, OOB as able.  Patient continues on 1 to 2 L nasal cannula oxygen.  Likely will need at discharge as well.  -- monitor Is/Os, fluid status  -- prns for cough  -- cont PPI, should remain upright for 2 hrs after meals    Afib with RVR, new diagnosis: Resolved  NSTEMI  * Had RRT on 4/4 for development of afib with RVR, rates 130s-150s in setting of sepsis. EKG with marked ST depression and TWI in lateral leads. Received IV diltiazem bolus with plan to transition to drip, developed three 3-second pauses with conversion to SR. Initiated on heparin drip. Remains in NSR since then. Troponin 17--98  * Lexiscan stress done 4/5/22 was negative for inducible ischemia  * TTE this stay with LVEF 60-65%, no WMAs, no valvular dz, mildly elevated PA pressure  * Seen by cardiology this stay --  ischemic workup neg, no indication for anticoagulation at this time given afib/RVR occurred in the setting of sepsis. If afib recurs, would rec AC and tx with amiodarone rather than diltiazem.     Encephalopathy, multifactorial: Resolved  * Altered mentation noted by nursing during hosptialization. Multifactorial in the setting of fever, narcotic pain medications, suspected underlying BOLIVAR, possible dehydration. Received dose of narcan, but did not significantly change pts mentation immediately, though  seemed to improve within the hour after IVF bolus was near completion. Mental status continues to improved.  * Meds adjusted this stay -- Lyrica stopped, gabapentin increased  * Continues on Wellbutrin, Requip. Keep low threshold to stop meds if confusion recurs  -- cont to reorient  -- suspect some degree of underlying BOLIVAR -- should have sleep study once recovered from surgery    Acute blood loss anemia  Chronic normocytic anemia  * Baseline 10-11. Down to 8.3 post-procedure. No active bleeding, some bleeding from wound bed with dressing changes. Could be dilutional as checked while receiving IVF bolus   * Received 1u PRBCs 3/25, 3/27, 4/6 for Hgb < 7  * NSAIDs (Celebrex) stopped this stay  * Iron studies this stay suggestive of iron deficiency -- started on oral iron supplement  * Hgb stable at 9 this stay    Hematuria: Resolved  Dysuria: Resolved  * Noted on 3/30. Likely dt trauma/irritation from garrison catheter.   * Had been on empiric abx as above. UA 4/1 neg.   * Sx resolved as of 3/31. Garrison removed 4/1.  * Repeat UA on 4/9 was unremarkable.     Chronic BLE lymphedema with venous insufficiency and chronic R leg cellulitis   S/P excisional debridement of RLE  ulceration and application of wound vac (3/21/22)  S/P intraoperative wound examination, wound debridement, and application of myriad 2 layer graft on 3/28  * Routine postop cares per Dr. Bullock including pain control  * Lyrica dc'd 4/5 as above, conts on gabapentin 400mg TID  * On diuretics as above    Low back pain/spasms  * Started 4/19. Exacerbated by lying in bed. Seem to improve when up in chair and ambulation  -- trial of heating pad vs ice pack, low dose flexeril prn    Severe Obesity  *  BMI 50.8 this stay. Follow up with PCP     MS  * Ambulatory at baseline, though has some some weakness in LEs and intermittent UE weakness.     GERD  * Chronic and stable on PPI.     Hyperlipidemia  * Chronic and stable on statin     Depression  * Resumed PTA  Celexa, Wellbutrin and Lyrica on 3/26/22 (had been held for a few days given AMS)  * As above, weaned Lyrica off and solely on gabapentin as of 4/5.     RLS  * Chronic and stable on Requip.     FEN: no IVFs, lytes stable, regular diet  DVT Prophylaxis: PCDs  Code Status: Full Code    Disposition: SW [facility] placement. Medical issues remain stable. Discharge per Dr. Bullock once placment found.    Yadiel Mccartyh    Interval History      Stable overnight. Feeling some shortness of breath with exertion.  No fevers today.  No CP  No nausea / vomiting  Reports anxiety uncontrolled at times.     -Data reviewed today: I reviewed all new labs and imaging results over the last 24 hours. I personally reviewed no images or EKG's today.    Physical Exam   Temp: 98.6  F (37  C) Temp src: Oral BP: (!) 141/70 Pulse: 86   Resp: 20 SpO2: 91 % O2 Device: Nasal cannula Oxygen Delivery: 1 LPM  Vitals:    04/18/22 0643 04/21/22 0621 04/22/22 0700   Weight: 127.8 kg (281 lb 12 oz) 122.4 kg (269 lb 13.5 oz) 120 kg (264 lb 8.8 oz)     Vital Signs with Ranges  Temp:  [98.6  F (37  C)-99.1  F (37.3  C)] 98.6  F (37  C)  Pulse:  [77-87] 86  Resp:  [18-20] 20  BP: (126-141)/(51-70) 141/70  SpO2:  [91 %-96 %] 91 %  I/O last 3 completed shifts:  In: 700 [P.O.:700]  Out: 600 [Urine:600]    Constitutional: Resting comfortably, alert and conversing appropriately, NAD  Respiratory: Few wheezes bilaterally, nonlabored breathing  Cardiovascular: HRRR, bilateral lower extremity edema noted, in wraps  GI: S, NT, ND, +BS  Skin/Integumen: warm/dry, RLE not directly examined by me  Other: Calm and pleasant    Medications     Reason anticoagulant not prescribed for atrial fibrillation       - MEDICATION INSTRUCTIONS -       - MEDICATION INSTRUCTIONS -         aspirin  81 mg Oral QPM     buPROPion  150 mg Oral QAM     citalopram  40 mg Oral QAM     cyanocobalamin  1,000 mcg Oral Daily     diosmin-hesperidin 450-50  1 capsule Oral BID     ferrous sulfate  325  mg Oral Daily     furosemide  40 mg Oral Daily     gabapentin  400 mg Oral TID     ipratropium - albuterol 0.5 mg/2.5 mg/3 mL  3 mL Nebulization Q4H While awake     Aleksandr  1 packet Oral BID     miconazole   Topical BID     multivitamin w/minerals  1 tablet Oral Daily     pantoprazole  40 mg Oral BID AC     polyethylene glycol  17 g Oral Daily     potassium chloride ER  20 mEq Oral QPM     potassium chloride ER  40 mEq Oral QAM     rOPINIRole  0.5 mg Oral BID     rOPINIRole  1 mg Oral At Bedtime     senna-docusate  1 tablet Oral BID     simvastatin  40 mg Oral At Bedtime     spironolactone  25 mg Oral Daily     vitamin B complex with vitamin C  1 tablet Oral Daily     Vitamin D3  250 mcg Oral Daily       Data   Recent Labs   Lab 04/21/22  0900 04/21/22  0858 04/20/22  1116 04/19/22  0809     --  136 137   POTASSIUM 3.9  --  4.0 4.0   CHLORIDE 104  --  104 104   CO2 31  --  30 32   BUN 30  --  27 24   CR 0.86  --  0.98 0.92   ANIONGAP 4  --  2* 1*   JUNI 9.3  --  9.4 9.4   GLC 98 97 115* 104*       No results found for this or any previous visit (from the past 24 hour(s)).

## 2022-04-26 PROCEDURE — 94660 CPAP INITIATION&MGMT: CPT

## 2022-04-26 PROCEDURE — 250N000013 HC RX MED GY IP 250 OP 250 PS 637: Performed by: STUDENT IN AN ORGANIZED HEALTH CARE EDUCATION/TRAINING PROGRAM

## 2022-04-26 PROCEDURE — 250N000013 HC RX MED GY IP 250 OP 250 PS 637: Performed by: INTERNAL MEDICINE

## 2022-04-26 PROCEDURE — 99232 SBSQ HOSP IP/OBS MODERATE 35: CPT | Performed by: STUDENT IN AN ORGANIZED HEALTH CARE EDUCATION/TRAINING PROGRAM

## 2022-04-26 PROCEDURE — 250N000013 HC RX MED GY IP 250 OP 250 PS 637: Performed by: SURGERY

## 2022-04-26 PROCEDURE — 250N000009 HC RX 250: Performed by: SURGERY

## 2022-04-26 PROCEDURE — 94640 AIRWAY INHALATION TREATMENT: CPT

## 2022-04-26 PROCEDURE — 94640 AIRWAY INHALATION TREATMENT: CPT | Mod: 76

## 2022-04-26 PROCEDURE — 120N000001 HC R&B MED SURG/OB

## 2022-04-26 PROCEDURE — 250N000013 HC RX MED GY IP 250 OP 250 PS 637: Performed by: HOSPITALIST

## 2022-04-26 PROCEDURE — 250N000013 HC RX MED GY IP 250 OP 250 PS 637: Performed by: PHYSICIAN ASSISTANT

## 2022-04-26 PROCEDURE — 999N000157 HC STATISTIC RCP TIME EA 10 MIN

## 2022-04-26 RX ADMIN — CYCLOBENZAPRINE 10 MG: 10 TABLET, FILM COATED ORAL at 09:52

## 2022-04-26 RX ADMIN — B-COMPLEX W/ C & FOLIC ACID TAB 1 TABLET: TAB at 08:30

## 2022-04-26 RX ADMIN — ROPINIROLE HYDROCHLORIDE 1 MG: 0.5 TABLET, FILM COATED ORAL at 22:12

## 2022-04-26 RX ADMIN — Medication 1 PACKET: at 22:13

## 2022-04-26 RX ADMIN — CYANOCOBALAMIN TAB 1000 MCG 1000 MCG: 1000 TAB at 08:30

## 2022-04-26 RX ADMIN — Medication 250 MCG: at 08:30

## 2022-04-26 RX ADMIN — IPRATROPIUM BROMIDE AND ALBUTEROL SULFATE 3 ML: .5; 3 SOLUTION RESPIRATORY (INHALATION) at 11:36

## 2022-04-26 RX ADMIN — GABAPENTIN 400 MG: 300 CAPSULE ORAL at 08:29

## 2022-04-26 RX ADMIN — MICONAZOLE NITRATE: 20 POWDER TOPICAL at 22:16

## 2022-04-26 RX ADMIN — GABAPENTIN 400 MG: 300 CAPSULE ORAL at 16:00

## 2022-04-26 RX ADMIN — Medication 1 PACKET: at 08:31

## 2022-04-26 RX ADMIN — ASPIRIN 81 MG CHEWABLE TABLET 81 MG: 81 TABLET CHEWABLE at 22:12

## 2022-04-26 RX ADMIN — BUPROPION HYDROCHLORIDE 150 MG: 150 TABLET, EXTENDED RELEASE ORAL at 08:30

## 2022-04-26 RX ADMIN — Medication 1 CAPSULE: at 08:34

## 2022-04-26 RX ADMIN — PANTOPRAZOLE SODIUM 40 MG: 40 TABLET, DELAYED RELEASE ORAL at 16:00

## 2022-04-26 RX ADMIN — ROPINIROLE HYDROCHLORIDE 0.5 MG: 0.5 TABLET, FILM COATED ORAL at 13:00

## 2022-04-26 RX ADMIN — Medication 1 CAPSULE: at 22:12

## 2022-04-26 RX ADMIN — CITALOPRAM HYDROBROMIDE 40 MG: 20 TABLET, FILM COATED ORAL at 08:30

## 2022-04-26 RX ADMIN — ROPINIROLE HYDROCHLORIDE 0.5 MG: 0.5 TABLET, FILM COATED ORAL at 08:30

## 2022-04-26 RX ADMIN — POTASSIUM CHLORIDE 20 MEQ: 1500 TABLET, EXTENDED RELEASE ORAL at 22:12

## 2022-04-26 RX ADMIN — MICONAZOLE NITRATE: 20 POWDER TOPICAL at 08:31

## 2022-04-26 RX ADMIN — FUROSEMIDE 40 MG: 40 TABLET ORAL at 08:29

## 2022-04-26 RX ADMIN — GUAIFENESIN 10 ML: 100 SOLUTION ORAL at 23:05

## 2022-04-26 RX ADMIN — ACETAMINOPHEN 650 MG: 325 TABLET, FILM COATED ORAL at 15:22

## 2022-04-26 RX ADMIN — IPRATROPIUM BROMIDE AND ALBUTEROL SULFATE 3 ML: .5; 3 SOLUTION RESPIRATORY (INHALATION) at 07:50

## 2022-04-26 RX ADMIN — IPRATROPIUM BROMIDE AND ALBUTEROL SULFATE 3 ML: .5; 3 SOLUTION RESPIRATORY (INHALATION) at 16:19

## 2022-04-26 RX ADMIN — SIMVASTATIN 40 MG: 40 TABLET, FILM COATED ORAL at 22:12

## 2022-04-26 RX ADMIN — PANTOPRAZOLE SODIUM 40 MG: 40 TABLET, DELAYED RELEASE ORAL at 06:42

## 2022-04-26 RX ADMIN — POTASSIUM CHLORIDE 40 MEQ: 1500 TABLET, EXTENDED RELEASE ORAL at 08:30

## 2022-04-26 RX ADMIN — FERROUS SULFATE TAB 325 MG (65 MG ELEMENTAL FE) 325 MG: 325 (65 FE) TAB at 08:30

## 2022-04-26 RX ADMIN — MULTIPLE VITAMINS W/ MINERALS TAB 1 TABLET: TAB at 08:30

## 2022-04-26 RX ADMIN — GABAPENTIN 400 MG: 300 CAPSULE ORAL at 22:12

## 2022-04-26 RX ADMIN — SPIRONOLACTONE 25 MG: 25 TABLET ORAL at 08:30

## 2022-04-26 ASSESSMENT — ACTIVITIES OF DAILY LIVING (ADL)
ADLS_ACUITY_SCORE: 12

## 2022-04-26 NOTE — PROGRESS NOTES
[facility]    Hospitalist Progress Note    Date of Service: 04/26/2022    Assessment & Plan      Elizabeth Kelley is a 76 year old female with PMHx of MS, chronic BLE lymphedema with venous insufficiency and chronic R leg cellulitis, GERD, hyperlipidemia, and depression who underwent a scheduled excisional debridement of RLE cicumferential venous hypertensive ulceration and application of wound vac per Dr. Bullock on 3/21/22.      Hospitalist service consulted 3/23/22 for AMS ultimately determined to have sepsis 2/2 CAP with respiratory failure. She had been progressing well, completing antibiotic regimen and weaned to room air with discharge planning in progress until 4/3 with worsening systemic evidence of infection and hypoxia, resumed on broad-spectrum antibiotics and imaging suggestive of bilateral pulmonary infiltrates. Also with development of afib with RVR requiring IV medications, transition to IMC. Cardiac workup unrevealing, has since improved from cardiopulmonary perspective and transferred to Med/Surg.     Sepsis dt pneumonia vs aspiration pneumonia, recurrent: Resolved   Acute hypoxic respiratory failure dt above  Acute on chronic diastolic CHF exacerbation: Resolved  * On 3/23 Tmax 101.5, tachycardic in the 110s, hypoxic to 86% on room air. WBC 18.3, procal 0.17. CXR with patchy airspace opacities in the left mid and lower lung, suspicious for pneumonia. Treated with Zosyn for CAP, repeat CXR 3/28 with new right upper lobe infiltrate and basilar infiltrate lateral views. Received diuretics for volume overload, transitioned to Augmentin on 3/30 with plans for 14 day course to treat LE cellulitis.  * 4/3 with fever to 102, uptrending WBC with mildly elevated procalcitonin. C/O SOB, noted to be hypoxic. COVID-19, RSV, Influenza PCR neg. UA neg. CXR on 4/2 neg. LE wound valuated by vascular surgery, no evidence of infection. CTA Chest neg for PE, with bilateral pulmonary infiltrates  L>R, most consistent with infectious etiology. Blood cultures x2 repeated remain negative, Resumed IV Zosyn, Vanco. ID consulted.   * 4/5 Video swallow negative, cleared for reg diet with instructions to sit upright, speech continuing to follow  * 4/6 Weaned to 1-2 L O2.  * Likely recurrent aspiration pneumonia 2/2 hiatal hernia  * Zosyn transitioned to Unasyn on 4/5  * ID consulted. Completed course of abx on 4/8.   * Diuresed with IV Lasix this stay -- changed to oral Lasix (40mg po daily) on 4/15  -- cont Lasix 40mg po daily, spironolactone 25mg daily  -- cont sched duonebs, prn albuterol nebs   -- pulmonary toilet with IS, flutter valve, OOB as able.  Patient continues on 1 to 2 L nasal cannula oxygen.  Likely will need at discharge as well.  -- monitor Is/Os, fluid status  -- prns for cough  -- cont PPI, should remain upright for 2 hrs after meals    Afib with RVR, new diagnosis: Resolved  NSTEMI  * Had RRT on 4/4 for development of afib with RVR, rates 130s-150s in setting of sepsis. EKG with marked ST depression and TWI in lateral leads. Received IV diltiazem bolus with plan to transition to drip, developed three 3-second pauses with conversion to SR. Initiated on heparin drip. Remains in NSR since then. Troponin 17--98  * Lexiscan stress done 4/5/22 was negative for inducible ischemia  * TTE this stay with LVEF 60-65%, no WMAs, no valvular dz, mildly elevated PA pressure  * Seen by cardiology this stay --  ischemic workup neg, no indication for anticoagulation at this time given afib/RVR occurred in the setting of sepsis. If afib recurs, would rec AC and tx with amiodarone rather than diltiazem.     Encephalopathy, multifactorial: Resolved  * Altered mentation noted by nursing during hosptialization. Multifactorial in the setting of fever, narcotic pain medications, suspected underlying BOLIVAR, possible dehydration. Received dose of narcan, but did not significantly change pts mentation immediately, though  seemed to improve within the hour after IVF bolus was near completion. Mental status continues to improved.  * Meds adjusted this stay -- Lyrica stopped, gabapentin increased  * Continues on Wellbutrin, Requip. Keep low threshold to stop meds if confusion recurs  -- cont to reorient  -- suspect some degree of underlying BOLIVAR -- should have sleep study once recovered from surgery    Acute blood loss anemia  Chronic normocytic anemia  * Baseline 10-11. Down to 8.3 post-procedure. No active bleeding, some bleeding from wound bed with dressing changes. Could be dilutional as checked while receiving IVF bolus   * Received 1u PRBCs 3/25, 3/27, 4/6 for Hgb < 7  * NSAIDs (Celebrex) stopped this stay  * Iron studies this stay suggestive of iron deficiency -- started on oral iron supplement  * Hgb stable at 9 this stay    Hematuria: Resolved  Dysuria: Resolved  * Noted on 3/30. Likely dt trauma/irritation from garrison catheter.   * Had been on empiric abx as above. UA 4/1 neg.   * Sx resolved as of 3/31. Garrison removed 4/1.  * Repeat UA on 4/9 was unremarkable.     Chronic BLE lymphedema with venous insufficiency and chronic R leg cellulitis   S/P excisional debridement of RLE  ulceration and application of wound vac (3/21/22)  S/P intraoperative wound examination, wound debridement, and application of myriad 2 layer graft on 3/28  * Routine postop cares per Dr. Bullock including pain control  * Lyrica dc'd 4/5 as above, conts on gabapentin 400mg TID  * On diuretics as above    Low back pain/spasms  * Started 4/19. Exacerbated by lying in bed. Seem to improve when up in chair and ambulation  -- trial of heating pad vs ice pack, low dose flexeril prn    Severe Obesity  *  BMI 50.8 this stay. Follow up with PCP     MS  * Ambulatory at baseline, though has some some weakness in LEs and intermittent UE weakness.     GERD  * Chronic and stable on PPI.     Hyperlipidemia  * Chronic and stable on statin     Depression  * Resumed PTA  Celexa, Wellbutrin and Lyrica on 3/26/22 (had been held for a few days given AMS)  * As above, weaned Lyrica off and solely on gabapentin as of 4/5.     RLS  * Chronic and stable on Requip.     FEN: no IVFs, lytes stable, regular diet  DVT Prophylaxis: PCDs  Code Status: Full Code    Disposition: SW coordinating placement. Medical issues remain stable. Discharge per Dr. Bullock once placment found.    Yadiel Mccartyh    Interval History      Stable overnight. No fevers today.  No CP, SOB with exertion improving  No nausea / vomiting   No significant pain overall  No new complaints, anxiety better.    -Data reviewed today: I reviewed all new labs and imaging results over the last 24 hours. I personally reviewed no images or EKG's today.    Physical Exam   Temp: 98.6  F (37  C) Temp src: Oral BP: (!) 142/68 Pulse: 52   Resp: 18 SpO2: 95 % O2 Device: Nasal cannula Oxygen Delivery: 1 LPM  Vitals:    04/21/22 0621 04/22/22 0700 04/26/22 0652   Weight: 122.4 kg (269 lb 13.5 oz) 120 kg (264 lb 8.8 oz) 118.2 kg (260 lb 9.3 oz)     Vital Signs with Ranges  Temp:  [97.9  F (36.6  C)-99.1  F (37.3  C)] 98.6  F (37  C)  Pulse:  [52-84] 52  Resp:  [18] 18  BP: (142-147)/(66-68) 142/68  SpO2:  [92 %-96 %] 95 %  I/O last 3 completed shifts:  In: 400 [P.O.:400]  Out: -     Constitutional: Resting comfortably, alert and conversing appropriately, NAD  Respiratory: Few wheezes bilaterally, nonlabored breathing  Cardiovascular: HRRR, bilateral lower extremity edema noted, in wraps  GI: S, NT, ND, +BS  Skin/Integumen: warm/dry, RLE not directly examined by me  Other: Calm and pleasant    Medications     Reason anticoagulant not prescribed for atrial fibrillation       - MEDICATION INSTRUCTIONS -       - MEDICATION INSTRUCTIONS -         aspirin  81 mg Oral QPM     buPROPion  150 mg Oral QAM     citalopram  40 mg Oral QAM     cyanocobalamin  1,000 mcg Oral Daily     diosmin-hesperidin 450-50  1 capsule Oral BID     ferrous sulfate  325 mg Oral  Daily     furosemide  40 mg Oral Daily     gabapentin  400 mg Oral TID     ipratropium - albuterol 0.5 mg/2.5 mg/3 mL  3 mL Nebulization Q4H While awake     Aleksandr  1 packet Oral BID     miconazole   Topical BID     multivitamin w/minerals  1 tablet Oral Daily     pantoprazole  40 mg Oral BID AC     polyethylene glycol  17 g Oral Daily     potassium chloride ER  20 mEq Oral QPM     potassium chloride ER  40 mEq Oral QAM     rOPINIRole  0.5 mg Oral BID     rOPINIRole  1 mg Oral At Bedtime     senna-docusate  1 tablet Oral BID     simvastatin  40 mg Oral At Bedtime     spironolactone  25 mg Oral Daily     vitamin B complex with vitamin C  1 tablet Oral Daily     Vitamin D3  250 mcg Oral Daily       Data   Recent Labs   Lab 04/21/22  0900 04/21/22  0858 04/20/22  1116     --  136   POTASSIUM 3.9  --  4.0   CHLORIDE 104  --  104   CO2 31  --  30   BUN 30  --  27   CR 0.86  --  0.98   ANIONGAP 4  --  2*   JUNI 9.3  --  9.4   GLC 98 97 115*       No results found for this or any previous visit (from the past 24 hour(s)).

## 2022-04-26 NOTE — PROGRESS NOTES
Care Management Follow Up    Length of Stay (days): 35    Expected Discharge Date: 04/27/2022     Concerns to be Addressed:       Patient plan of care discussed at interdisciplinary rounds: Yes    Anticipated Discharge Disposition:  TCU     Anticipated Discharge Services: None  Anticipated Discharge DME:      Patient/family educated on Medicare website which has current facility and service quality ratings:    Education Provided on the Discharge Plan:    Patient/Family in Agreement with the Plan: yes    Referrals Placed by CM/SW:    Private pay costs discussed:     Additional Information:  [facility], daughter's preference, has informed writer they do not have a vacancy to assess patient for.   Writer has reviewed Dr Bullock and am seeking clarification from [facility] RN if patient will have daily dressing changes in the TCU.  PT continues to recommend TCU.  Additional referrals need to be followed up on to locate a TCU for patient within her insurance network. Writer resent referrals to [facility] and [facility]'s because since they orignally declined patient her acuity needs have greatly decreased.      Christina Cabrera, KAYLASW

## 2022-04-26 NOTE — PROGRESS NOTES
Visited with patient and her daughter, Misonex acid dressing changes twice a day to right lower extremity, would not need split-thickness skin graft.  Insurance letter from [facility] reviewed, states denial of outpatient negative pressure wound therapy however outpatient negative pressure wound therapy was never utilized, this is of concern to the family regarding denial of coverage, we are reaching out to [facility] as a subsidiary of [facility] to discuss and resolve the issue for the family.  Awaiting options for transfer to a care facility, at that point would place a 2 layer wrap, she would then undergo 2 layer wraps at our facility on a weekly basis until complete epithelialization.  Ara

## 2022-04-26 NOTE — PLAN OF CARE
Goal Outcome Evaluation:                    DATE & TIME:4/26/22 1430  Cognitive Concerns/ Orientation: A&Ox4; calm and cooperative  BEHAVIOR & AGGRESSION TOOL COLOR: Green  ABNL VS/O2: VSS on 1L NC; attempted to wean off O2 again today but sat will hover between 88-90%. Uses CPAP at night  MOBILITY: Ax1 with walker, refusing GB in room. Walked in halls.  PAIN MANAGMENT: Intermittent low back pain, received flexaril which relieved the pain.  DIET: Regular diet, no straws- aspiration precautions.   BOWEL/BLADDER: Using commode during daytime, occ incontinent.   ABNL LAB: None new  DRAIN/DEVICES: No IV access  TELEMETRY RHYTHM: N/A  SKIN: Wound care completed on RLE, dressing CDI. Michelle area reddened, protocol followed   TESTS/PROCEDURES: N/A  D/C DAY/GOALS/PLACE: TBD, awaiting options onTCU placement, SW following.  OTHER IMPORTANT INFO: Contact precautions maintained for hx of MRSA. PT/WOC following. Refused scheduled nebs, frequent congested cough, HILLMAN at times.

## 2022-04-26 NOTE — PLAN OF CARE
DATE & TIME:4/25/22 1900- 4/26/22 5684  Cognitive Concerns/ Orientation: A&Ox4; calm and cooperative  BEHAVIOR & AGGRESSION TOOL COLOR: Green  ABNL VS/O2: VSS on 1L NC; CPAP at night  MOBILITY: Ax1 with walker, refusing GB in room.   PAIN MANAGMENT: Intermittent back pain, declined interventions  DIET: Regular diet, no straws- aspiration precautions.   BOWEL/BLADDER: Incontinent of bladder at times; up to BSC, no BM this shift. Purewick on overnight.   ABNL LAB: None new  DRAIN/DEVICES: No IV access  TELEMETRY RHYTHM: N/A  SKIN: Wound care completed on RLE, dressing CDI. Michelle area reddened, protocol followed   TESTS/PROCEDURES: N/A  D/C DAY/GOALS/PLACE: TBD, pending TCU placement, SW following.  OTHER IMPORTANT INFO: Contact precautions maintained for hx of MRSA. PT/WOC following. Refused scheduled nebs, frequent congested cough, HILLMAN. PRN Robitussin given x1.

## 2022-04-27 ENCOUNTER — TELEPHONE (OUTPATIENT)
Dept: WOUND CARE | Facility: CLINIC | Age: 77
End: 2022-04-27
Payer: COMMERCIAL

## 2022-04-27 ENCOUNTER — APPOINTMENT (OUTPATIENT)
Dept: PHYSICAL THERAPY | Facility: CLINIC | Age: 77
DRG: 264 | End: 2022-04-27
Attending: SURGERY
Payer: COMMERCIAL

## 2022-04-27 LAB — SARS-COV-2 RNA RESP QL NAA+PROBE: NEGATIVE

## 2022-04-27 PROCEDURE — 99231 SBSQ HOSP IP/OBS SF/LOW 25: CPT | Performed by: STUDENT IN AN ORGANIZED HEALTH CARE EDUCATION/TRAINING PROGRAM

## 2022-04-27 PROCEDURE — 250N000013 HC RX MED GY IP 250 OP 250 PS 637: Performed by: HOSPITALIST

## 2022-04-27 PROCEDURE — 250N000013 HC RX MED GY IP 250 OP 250 PS 637: Performed by: SURGERY

## 2022-04-27 PROCEDURE — 94640 AIRWAY INHALATION TREATMENT: CPT | Mod: 76

## 2022-04-27 PROCEDURE — 250N000013 HC RX MED GY IP 250 OP 250 PS 637: Performed by: INTERNAL MEDICINE

## 2022-04-27 PROCEDURE — 120N000001 HC R&B MED SURG/OB

## 2022-04-27 PROCEDURE — 250N000013 HC RX MED GY IP 250 OP 250 PS 637: Performed by: PHYSICIAN ASSISTANT

## 2022-04-27 PROCEDURE — 999N000157 HC STATISTIC RCP TIME EA 10 MIN

## 2022-04-27 PROCEDURE — 97530 THERAPEUTIC ACTIVITIES: CPT | Mod: GP

## 2022-04-27 PROCEDURE — 250N000009 HC RX 250: Performed by: SURGERY

## 2022-04-27 PROCEDURE — G0463 HOSPITAL OUTPT CLINIC VISIT: HCPCS

## 2022-04-27 PROCEDURE — 94660 CPAP INITIATION&MGMT: CPT

## 2022-04-27 PROCEDURE — U0003 INFECTIOUS AGENT DETECTION BY NUCLEIC ACID (DNA OR RNA); SEVERE ACUTE RESPIRATORY SYNDROME CORONAVIRUS 2 (SARS-COV-2) (CORONAVIRUS DISEASE [COVID-19]), AMPLIFIED PROBE TECHNIQUE, MAKING USE OF HIGH THROUGHPUT TECHNOLOGIES AS DESCRIBED BY CMS-2020-01-R: HCPCS | Performed by: STUDENT IN AN ORGANIZED HEALTH CARE EDUCATION/TRAINING PROGRAM

## 2022-04-27 PROCEDURE — 94640 AIRWAY INHALATION TREATMENT: CPT

## 2022-04-27 PROCEDURE — 97116 GAIT TRAINING THERAPY: CPT | Mod: GP

## 2022-04-27 RX ADMIN — Medication 1 PACKET: at 09:23

## 2022-04-27 RX ADMIN — IPRATROPIUM BROMIDE AND ALBUTEROL SULFATE 3 ML: .5; 3 SOLUTION RESPIRATORY (INHALATION) at 11:27

## 2022-04-27 RX ADMIN — Medication 1 CAPSULE: at 21:30

## 2022-04-27 RX ADMIN — CYANOCOBALAMIN TAB 1000 MCG 1000 MCG: 1000 TAB at 09:14

## 2022-04-27 RX ADMIN — ACETAMINOPHEN 650 MG: 325 TABLET, FILM COATED ORAL at 20:51

## 2022-04-27 RX ADMIN — ASPIRIN 81 MG CHEWABLE TABLET 81 MG: 81 TABLET CHEWABLE at 20:43

## 2022-04-27 RX ADMIN — SPIRONOLACTONE 25 MG: 25 TABLET ORAL at 09:14

## 2022-04-27 RX ADMIN — GUAIFENESIN 10 ML: 100 SOLUTION ORAL at 21:30

## 2022-04-27 RX ADMIN — SIMVASTATIN 40 MG: 40 TABLET, FILM COATED ORAL at 21:30

## 2022-04-27 RX ADMIN — BUPROPION HYDROCHLORIDE 150 MG: 150 TABLET, EXTENDED RELEASE ORAL at 09:14

## 2022-04-27 RX ADMIN — GABAPENTIN 400 MG: 300 CAPSULE ORAL at 09:14

## 2022-04-27 RX ADMIN — IPRATROPIUM BROMIDE AND ALBUTEROL SULFATE 3 ML: .5; 3 SOLUTION RESPIRATORY (INHALATION) at 15:29

## 2022-04-27 RX ADMIN — ROPINIROLE HYDROCHLORIDE 0.5 MG: 0.5 TABLET, FILM COATED ORAL at 13:37

## 2022-04-27 RX ADMIN — GABAPENTIN 400 MG: 300 CAPSULE ORAL at 21:30

## 2022-04-27 RX ADMIN — Medication 1 PACKET: at 20:44

## 2022-04-27 RX ADMIN — CITALOPRAM HYDROBROMIDE 40 MG: 20 TABLET, FILM COATED ORAL at 09:15

## 2022-04-27 RX ADMIN — Medication 1 CAPSULE: at 09:15

## 2022-04-27 RX ADMIN — GABAPENTIN 400 MG: 300 CAPSULE ORAL at 16:31

## 2022-04-27 RX ADMIN — PANTOPRAZOLE SODIUM 40 MG: 40 TABLET, DELAYED RELEASE ORAL at 16:31

## 2022-04-27 RX ADMIN — B-COMPLEX W/ C & FOLIC ACID TAB 1 TABLET: TAB at 09:15

## 2022-04-27 RX ADMIN — POTASSIUM CHLORIDE 20 MEQ: 1500 TABLET, EXTENDED RELEASE ORAL at 20:43

## 2022-04-27 RX ADMIN — MULTIPLE VITAMINS W/ MINERALS TAB 1 TABLET: TAB at 09:15

## 2022-04-27 RX ADMIN — FERROUS SULFATE TAB 325 MG (65 MG ELEMENTAL FE) 325 MG: 325 (65 FE) TAB at 09:14

## 2022-04-27 RX ADMIN — POTASSIUM CHLORIDE 40 MEQ: 1500 TABLET, EXTENDED RELEASE ORAL at 09:15

## 2022-04-27 RX ADMIN — PANTOPRAZOLE SODIUM 40 MG: 40 TABLET, DELAYED RELEASE ORAL at 09:14

## 2022-04-27 RX ADMIN — MICONAZOLE NITRATE: 20 POWDER TOPICAL at 21:30

## 2022-04-27 RX ADMIN — ROPINIROLE HYDROCHLORIDE 0.5 MG: 0.5 TABLET, FILM COATED ORAL at 09:14

## 2022-04-27 RX ADMIN — FUROSEMIDE 40 MG: 40 TABLET ORAL at 09:14

## 2022-04-27 RX ADMIN — MICONAZOLE NITRATE: 20 POWDER TOPICAL at 13:38

## 2022-04-27 RX ADMIN — Medication 250 MCG: at 09:14

## 2022-04-27 RX ADMIN — ROPINIROLE HYDROCHLORIDE 1 MG: 0.5 TABLET, FILM COATED ORAL at 20:42

## 2022-04-27 ASSESSMENT — ACTIVITIES OF DAILY LIVING (ADL)
ADLS_ACUITY_SCORE: 14
ADLS_ACUITY_SCORE: 15
ADLS_ACUITY_SCORE: 15
ADLS_ACUITY_SCORE: 14
ADLS_ACUITY_SCORE: 15
ADLS_ACUITY_SCORE: 14
ADLS_ACUITY_SCORE: 15
ADLS_ACUITY_SCORE: 12
ADLS_ACUITY_SCORE: 15
ADLS_ACUITY_SCORE: 14
ADLS_ACUITY_SCORE: 12
ADLS_ACUITY_SCORE: 14
ADLS_ACUITY_SCORE: 12
ADLS_ACUITY_SCORE: 15
ADLS_ACUITY_SCORE: 12
ADLS_ACUITY_SCORE: 14

## 2022-04-27 NOTE — PLAN OF CARE
DATE & TIME:4/26/22 1900- 4/27/22 3340  Cognitive Concerns/ Orientation: A&Ox4; calm and cooperative  BEHAVIOR & AGGRESSION TOOL COLOR: Green  ABNL VS/O2: VSS on 1L NC. Uses CPAP at night  MOBILITY: Ax1 with walker, refusing GB in room. Walked in halls x1 this shift.  PAIN MANAGMENT: Intermittent low back pain, declined interventions.  DIET: Regular diet, no straws- aspiration precautions.   BOWEL/BLADDER: Using commode during daytime, incontinent at times.  Purewick at night.   ABNL LAB: None new  DRAIN/DEVICES: No IV access  TELEMETRY RHYTHM: N/A  SKIN: Wound care completed on RLE, dressing CDI. Michelle area reddened, protocol followed   TESTS/PROCEDURES: N/A  D/C DAY/GOALS/PLACE: TBD, awaiting options on TCU placement, SW following.  OTHER IMPORTANT INFO: Contact precautions maintained for hx of MRSA. PT/WOC following. Refused scheduled nebs, frequent congested cough, HILLMAN .  PRN Robitussin given x1.

## 2022-04-27 NOTE — PROGRESS NOTES
Care Management Follow Up    Length of Stay (days): 36    Expected Discharge Date: 04/28/2022     Concerns to be Addressed:       Patient plan of care discussed at interdisciplinary rounds: Yes    Anticipated Discharge Disposition: Transitional Care vs LTACH     Anticipated Discharge Services: rehab  Anticipated Discharge DME:  NA    Patient/family educated on Medicare website which has current facility and service quality ratings:  pending  Education Provided on the Discharge Plan:  pending  Patient/Family in Agreement with the Plan: will have further LTACH discussion    Referrals Placed by CM/SW:  LTACH, and SW has made many TCU referrals  Private pay costs discussed: Not applicable    Additional Information:  The  has resent TCU referrals to [facility] and [facility].  Discussed case with Jus, Clinical Liaison from [facility].  They will work on getting authorization from pt's insurance and could possibly have a bed as early as tomorrow.   Pt and her daughter were updated.  There first choice is [facility] due to location close to the [facility], but are in agreement for [facility] if [facility] declines.      Anny Chavez, RN   Inpatient Care Management  [phone]

## 2022-04-27 NOTE — PROGRESS NOTES
Care Management Follow Up    Length of Stay (days): 36    Expected Discharge Date: 04/28/2022     Concerns to be Addressed:       Patient plan of care discussed at interdisciplinary rounds: Yes    Anticipated Discharge Disposition: Transitional Care     Anticipated Discharge Services: None  Anticipated Discharge DME:      Patient/family educated on Medicare website which has current facility and service quality ratings:    Education Provided on the Discharge Plan:    Patient/Family in Agreement with the Plan: yes    Referrals Placed by CM/SW:    Private pay costs discussed: Not applicable    Additional Information:  Writer resent referral to [facility] and  and called to leave a message asking them to re-review for a bed due to patients wound care being managed in the clinic. [facility] is families first choice. Will wait to hear from facilities after re-reviewing and move forward from there. If we cannot find a facility family is in agreement with, that can accept patients needs and insurance home care may have to be looked at if patient progresses in therapy enough to be safe when she is alone and spouse is at work.     Writer attempted to get a hold of daughter Carolyn to discuss options and where we were at in the search for TCU but was unable to reach her.     SW will continue to follow    JACKY Byrnes

## 2022-04-27 NOTE — TELEPHONE ENCOUNTER
Audelia from [employer] called regarding authorization #[id number] and following a conversation with HAROLDO Retana. Care Management confirmed that they are outside the window for peer to peer. A review would need to go to first level of appeal.  Can call Audelia at [phone] ext [phone] with any questions

## 2022-04-27 NOTE — PROGRESS NOTES
[facility]    Hospitalist Progress Note    Date of Service: 04/27/2022    Assessment & Plan      Elizabeth Kelley is a 76 year old female with PMHx of MS, chronic BLE lymphedema with venous insufficiency and chronic R leg cellulitis, GERD, hyperlipidemia, and depression who underwent a scheduled excisional debridement of RLE cicumferential venous hypertensive ulceration and application of wound vac per Dr. Bullock on 3/21/22.      Hospitalist service consulted 3/23/22 for AMS ultimately determined to have sepsis 2/2 CAP with respiratory failure. She had been progressing well, completing antibiotic regimen and weaned to room air with discharge planning in progress until 4/3 with worsening systemic evidence of infection and hypoxia, resumed on broad-spectrum antibiotics and imaging suggestive of bilateral pulmonary infiltrates. Also with development of afib with RVR requiring IV medications, transition to IMC. Cardiac workup unrevealing, has since improved from cardiopulmonary perspective and transferred to Med/Surg.     Sepsis dt pneumonia vs aspiration pneumonia, recurrent: Resolved   Acute hypoxic respiratory failure dt above  Acute on chronic diastolic CHF exacerbation: Resolved  * On 3/23 Tmax 101.5, tachycardic in the 110s, hypoxic to 86% on room air. WBC 18.3, procal 0.17. CXR with patchy airspace opacities in the left mid and lower lung, suspicious for pneumonia. Treated with Zosyn for CAP, repeat CXR 3/28 with new right upper lobe infiltrate and basilar infiltrate lateral views. Received diuretics for volume overload, transitioned to Augmentin on 3/30 with plans for 14 day course to treat LE cellulitis.  * 4/3 with fever to 102, uptrending WBC with mildly elevated procalcitonin. C/O SOB, noted to be hypoxic. COVID-19, RSV, Influenza PCR neg. UA neg. CXR on 4/2 neg. LE wound valuated by vascular surgery, no evidence of infection. CTA Chest neg for PE, with bilateral pulmonary infiltrates  L>R, most consistent with infectious etiology. Blood cultures x2 repeated remain negative, Resumed IV Zosyn, Vanco. ID consulted.   * 4/5 Video swallow negative, cleared for reg diet with instructions to sit upright, speech continuing to follow  * 4/6 Weaned to 1-2 L O2.  * Likely recurrent aspiration pneumonia 2/2 hiatal hernia  * Zosyn transitioned to Unasyn on 4/5  * ID consulted. Completed course of abx on 4/8.   * Diuresed with IV Lasix this stay -- changed to oral Lasix (40mg po daily) on 4/15  -- cont Lasix 40mg po daily, spironolactone 25mg daily  -- cont sched duonebs, prn albuterol nebs   -- pulmonary toilet with IS, flutter valve, OOB as able.  Patient continues on 1 to 2 L nasal cannula oxygen.  Likely will need at discharge as well.  -- monitor Is/Os, fluid status  -- prns for cough  -- cont PPI, should remain upright for 2 hrs after meals    Afib with RVR, new diagnosis: Resolved  NSTEMI  * Had RRT on 4/4 for development of afib with RVR, rates 130s-150s in setting of sepsis. EKG with marked ST depression and TWI in lateral leads. Received IV diltiazem bolus with plan to transition to drip, developed three 3-second pauses with conversion to SR. Initiated on heparin drip. Remains in NSR since then. Troponin 17--98  * Lexiscan stress done 4/5/22 was negative for inducible ischemia  * TTE this stay with LVEF 60-65%, no WMAs, no valvular dz, mildly elevated PA pressure  * Seen by cardiology this stay --  ischemic workup neg, no indication for anticoagulation at this time given afib/RVR occurred in the setting of sepsis. If afib recurs, would rec AC and tx with amiodarone rather than diltiazem.     Encephalopathy, multifactorial: Resolved  * Altered mentation noted by nursing during hosptialization. Multifactorial in the setting of fever, narcotic pain medications, suspected underlying BOLIVAR, possible dehydration. Received dose of narcan, but did not significantly change pts mentation immediately, though  seemed to improve within the hour after IVF bolus was near completion. Mental status continues to improved.  * Meds adjusted this stay -- Lyrica stopped, gabapentin increased  * Continues on Wellbutrin, Requip. Keep low threshold to stop meds if confusion recurs  -- cont to reorient  -- suspect some degree of underlying BOLIVAR -- should have sleep study once recovered from surgery    Acute blood loss anemia  Chronic normocytic anemia  * Baseline 10-11. Down to 8.3 post-procedure. No active bleeding, some bleeding from wound bed with dressing changes. Could be dilutional as checked while receiving IVF bolus   * Received 1u PRBCs 3/25, 3/27, 4/6 for Hgb < 7  * NSAIDs (Celebrex) stopped this stay  * Iron studies this stay suggestive of iron deficiency -- started on oral iron supplement  * Hgb stable at 9 this stay    Hematuria: Resolved  Dysuria: Resolved  * Noted on 3/30. Likely dt trauma/irritation from garrison catheter.   * Had been on empiric abx as above. UA 4/1 neg.   * Sx resolved as of 3/31. Garrison removed 4/1.  * Repeat UA on 4/9 was unremarkable.     Chronic BLE lymphedema with venous insufficiency and chronic R leg cellulitis   S/P excisional debridement of RLE  ulceration and application of wound vac (3/21/22)  S/P intraoperative wound examination, wound debridement, and application of myriad 2 layer graft on 3/28  * Routine postop cares per Dr. Bullock including pain control  * Lyrica dc'd 4/5 as above, conts on gabapentin 400mg TID  * On diuretics as above    Low back pain/spasms  * Started 4/19. Exacerbated by lying in bed. Seem to improve when up in chair and ambulation  -- trial of heating pad vs ice pack, low dose flexeril prn    Severe Obesity  *  BMI 50.8 this stay. Follow up with PCP     MS  * Ambulatory at baseline, though has some some weakness in LEs and intermittent UE weakness.     GERD  * Chronic and stable on PPI.     Hyperlipidemia  * Chronic and stable on statin     Depression  * Resumed PTA  Celexa, Wellbutrin and Lyrica on 3/26/22 (had been held for a few days given AMS)  * As above, weaned Lyrica off and solely on gabapentin as of 4/5.     RLS  * Chronic and stable on Requip.     FEN: no IVFs, lytes stable, regular diet  DVT Prophylaxis: PCDs  Code Status: Full Code    Disposition: SW coordinating placement. Medical issues remain stable. Discharge per Dr. Bullock once placement found -> possibly 04/28/2022    Yadiel Zacarias    Interval History      Stable overnight. No fevers today.  No CP, SOB, O2 needs stable  No nausea / vomiting   No significant pain overall  No new complaints, anxiety better.    -Data reviewed today: I reviewed all new labs and imaging results over the last 24 hours. I personally reviewed no images or EKG's today.    Physical Exam   Temp: 98.6  F (37  C) Temp src: Oral BP: 131/57 Pulse: 83   Resp: 20 SpO2: 94 % O2 Device: Nasal cannula Oxygen Delivery: 1 LPM  Vitals:    04/22/22 0700 04/26/22 0652 04/27/22 0600   Weight: 120 kg (264 lb 8.8 oz) 118.2 kg (260 lb 9.3 oz) 120 kg (264 lb 8.8 oz)     Vital Signs with Ranges  Temp:  [98.6  F (37  C)] 98.6  F (37  C)  Pulse:  [80-84] 83  Resp:  [18-20] 20  BP: (131-155)/(54-72) 131/57  SpO2:  [93 %-94 %] 94 %  I/O last 3 completed shifts:  In: 990 [P.O.:990]  Out: 150 [Urine:150]    Constitutional: Resting comfortably, alert and conversing appropriately, NAD  Respiratory: Few wheezes bilaterally, nonlabored breathing  Cardiovascular: HRRR, bilateral lower extremity edema noted, in wraps  GI: S, NT, ND, +BS  Skin/Integumen: warm/dry, RLE not directly examined by me  Other: Calm and pleasant    Medications     Reason anticoagulant not prescribed for atrial fibrillation       - MEDICATION INSTRUCTIONS -       - MEDICATION INSTRUCTIONS -         aspirin  81 mg Oral QPM     buPROPion  150 mg Oral QAM     citalopram  40 mg Oral QAM     cyanocobalamin  1,000 mcg Oral Daily     diosmin-hesperidin 450-50  1 capsule Oral BID     ferrous sulfate  325 mg  Oral Daily     furosemide  40 mg Oral Daily     gabapentin  400 mg Oral TID     ipratropium - albuterol 0.5 mg/2.5 mg/3 mL  3 mL Nebulization Q4H While awake     Aleksandr  1 packet Oral BID     miconazole   Topical BID     multivitamin w/minerals  1 tablet Oral Daily     pantoprazole  40 mg Oral BID AC     polyethylene glycol  17 g Oral Daily     potassium chloride ER  20 mEq Oral QPM     potassium chloride ER  40 mEq Oral QAM     rOPINIRole  0.5 mg Oral BID     rOPINIRole  1 mg Oral At Bedtime     senna-docusate  1 tablet Oral BID     simvastatin  40 mg Oral At Bedtime     spironolactone  25 mg Oral Daily     vitamin B complex with vitamin C  1 tablet Oral Daily     Vitamin D3  250 mcg Oral Daily       Data   Recent Labs   Lab 04/21/22  0900 04/21/22  0858     --    POTASSIUM 3.9  --    CHLORIDE 104  --    CO2 31  --    BUN 30  --    CR 0.86  --    ANIONGAP 4  --    JUNI 9.3  --    GLC 98 97       No results found for this or any previous visit (from the past 24 hour(s)).

## 2022-04-27 NOTE — PROGRESS NOTES
Remains on oxygen, desaturates off oxygen, debilitated, sarcopenia, physical therapy for increasing strength, ongoing hospitalization required until can be admitted to a long-term care facility, does remain actively sought for transfer.  She will not require skin graft due to near complete epithelialization of the right lower extremity with current hypochlorous acid Vashe dressing changes twice a day and lymphedema management.  Monique reviewed with nursing staff, patient, daughter.  Aar

## 2022-04-27 NOTE — PROGRESS NOTES
[facility] Nurse Inpatient Wound Assessment     Reason for consultation: Evaluate and treat perineal area     Assessment  Perineal area, inner thighs, gluteal cleft: IAD (incontinence-associated dermatitis) and MASD (moisture-associated skin damage), related to urinary and fecal incontinence and chafing/sweating in skin folds, and fungal rash.  Slow continued improvement, though unable to visualize gluteal cleft today 4/27 as pt up in chair for leg cares.  Per pt and nursing is mostly unchanged or a little better.  Pt notes she has been ensuring staff cleans her up very well each night before bed.  Continue current POC.     RLE managed by Vascular Surgery and Wound clinic, they have orders for Nursing to do BID Vashe-moist Kerlix dressing changes and may apply a 2-layer wrap when pt is discharging.  [facility] briefly assessed leg today 4/27 since was already in room for perineal assessment, leg/wounds look clean and much improved, pt tolerating dressings well.    Treatment Plan  Perineal cares (groin, inner thighs, gluteal cleft): every shift and prn incontinence:  1.  Cleanse all areas with generous amount Rivas perineal lotion and soft dry wipes.  Spray the Rivas directly on the skin.  Avoid use of the blue bag wipes in devon area.   2.  Apply antifungal powder (ask MD to order) to skin folds, inner thighs, gluteal cleft.  Spread the powder evenly and thinly with a dry cloth.    3.  Apply Critic-aid barrier paste along the gluteal cleft/ inner buttocks and perianal areas only (avoid anterior groin/labia).  4.  Keep briefs off when in bed.  Have pt lie with legs apart as much as possible; consider using small fan to blow on area prn.     *PIP measures.  Reposition pt every 1-2 hrs.  Keep HOB as low as tolerated.   *Continue PureWick as needed when resting/sleeping but check/adjust it at least every 2 hrs/ with every turn.     Orders Reviewed  Recommended provider order: None, at this time  [facility]  Nurse follow-up plan: weekly  Nursing to notify the Provider(s) and re-consult the [facility] Nurse if wound(s) deteriorates or new skin concern.    Patient History  According to provider note(s):  Elizabeth Kelley is a 76 year old female with PMHx MS, chronic BLE lymphedema with venous insufficiency and chronic R leg cellulitis, GERD, hyperlipidemia, and depression who underwent previously scheduled excisional debridement of RLE cicumferential venous hypertensive ulceration and application of wound vac on 3/21/22. Hospitalist service consulted 3/23/22 for AMS ultimately determined to have sepsis 2/2 CAP with respiratory failure. She had been progressing well, completing antibiotic regimen and weaned to room air with worsening systemic evidence of infection and hypoxia on 4/3, resumed on broad-spectrum antibiotics and imaging suggestive of bilateral pulmonary infiltrates. Also with new diagnosis of afib with RVR requiring IV medications, transition to CCU.     Objective Data  Containment of urine/stool: Incontinence Protocol, pull-on briefs, purewick, toilet as able    Active Diet Order:  Orders Placed This Encounter      Combination Diet Regular Diet; Thin Liquids (level 0) (No straws)    Output:   I/O last 3 completed shifts:  In: 300 [P.O.:300]  Out: 150 [Urine:150]    Risk Assessment:   Sensory Perception: 4-->no impairment  Moisture: 3-->occasionally moist  Activity: 3-->walks occasionally  Mobility: 3-->slightly limited  Nutrition: 3-->adequate  Friction and Shear: 2-->potential problem  Ajith Score: 18                          Labs:   No lab results found in last 7 days.    Invalid input(s): GLUCOMBO    Physical Exam  Skin inspection: focused perineal areas    Wound Location:  Gluteal cleft (assessment from 4/21)  Date of last photo:  4-21-22      Wound History: pt incontinent of urine and stool.  Frequent dribbling especially when coughs.  Using purewick at night, trying to get up to toilet during the day.  Pt  obese and edematous with deep skin folds, trapping moisture.  Able to turn well with assist x 1-2.    Measurements (length x width x depth, in cm):   approx previously approx 5 x 0.3 x 0.1cm, now appears lightly intact  Wound Base: pink newly resurfaced tissue   Tunneling: N/A  Undermining: N/A  Palpation of the wound bed: normal   Periwound skin: deep pink erythema/rash to perineal area  Color: pink  Temperature: normal   Drainage: none  Odor: none  Pain: mild    Wound Location:  Inner thighs  Date of last photo:  4-27-22 4-21-22      Wound History: pt incontinent of urine and stool.  Frequent dribbling especially when coughs.  Using purewick at night, trying to get up to toilet during the day.  Pt obese and edematous with deep skin folds, trapping moisture.  Able to turn well with assist x 1-2.    Measurements (length x width x depth, in cm): scattered areas, approx 10 x 10cm each side, no depth  Wound Base: pink lightly resurfaced peeling tissue, lighter pink this week, more intact, less rashy.  Gluteal cleft now healed/intact.   Palpation of the wound bed: normal   Periwound skin: pink, peely  Color: pink  Temperature: normal   Drainage: none  Odor: none  Pain: mild    RLE  4-27-22 anterior        4-27-22 lateral        4-27-22 posterior/medial        Interventions  Current support surface: Standard  Atmos Air mattress  Current off-loading measures: Pillows  Visual inspection of wound(s) completed  Wound Care: done per plan of care  Supplies: reviewed  Education provided: importance of repositioning, plan of care, wound progress, Moisture management and Off-loading pressure  Discussed plan of care with Patient and Nurse    Vida Badillo RN, CWOCN

## 2022-04-27 NOTE — PLAN OF CARE
Goal Outcome Evaluation:    DATE & TIME:4/27/22 [id number]  Cognitive Concerns/ Orientation: A&Ox4; calm and cooperative  BEHAVIOR & AGGRESSION TOOL COLOR: Green  ABNL VS/O2: VSS on 1L NC. Uses CPAP at night  MOBILITY: Ax1 with walker, refusing GB in room. Walked in halls x3 this shift.  PAIN MANAGMENT: Some discomfort with dressing change in morning, declined medication.  DIET: Regular diet, no straws- aspiration precautions.   BOWEL/BLADDER: Using commode during daytime, incontinent at times.  Purewick at night.   ABNL LAB: None new  DRAIN/DEVICES: No IV access  TELEMETRY RHYTHM: N/A  SKIN: Wound care completed on RLE, dressing CDI. Michelle area reddened, protocol followed   TESTS/PROCEDURES: N/A  D/C DAY/GOALS/PLACE: TBD, awaiting options on TCU placement, SW/CC following.  OTHER IMPORTANT INFO: Contact precautions maintained for hx of MRSA. PT/WOC following. Infrequent cough, some HILLMAN.  Motivated to walk and increase strength.

## 2022-04-28 PROCEDURE — 99231 SBSQ HOSP IP/OBS SF/LOW 25: CPT | Performed by: INTERNAL MEDICINE

## 2022-04-28 PROCEDURE — 120N000001 HC R&B MED SURG/OB

## 2022-04-28 PROCEDURE — 250N000009 HC RX 250: Performed by: SURGERY

## 2022-04-28 PROCEDURE — 250N000013 HC RX MED GY IP 250 OP 250 PS 637: Performed by: INTERNAL MEDICINE

## 2022-04-28 PROCEDURE — 250N000013 HC RX MED GY IP 250 OP 250 PS 637: Performed by: SURGERY

## 2022-04-28 PROCEDURE — 999N000157 HC STATISTIC RCP TIME EA 10 MIN

## 2022-04-28 PROCEDURE — 94660 CPAP INITIATION&MGMT: CPT

## 2022-04-28 PROCEDURE — 250N000013 HC RX MED GY IP 250 OP 250 PS 637: Performed by: HOSPITALIST

## 2022-04-28 PROCEDURE — 94640 AIRWAY INHALATION TREATMENT: CPT | Mod: 76

## 2022-04-28 PROCEDURE — 250N000013 HC RX MED GY IP 250 OP 250 PS 637: Performed by: PHYSICIAN ASSISTANT

## 2022-04-28 RX ADMIN — CYANOCOBALAMIN TAB 1000 MCG 1000 MCG: 1000 TAB at 09:13

## 2022-04-28 RX ADMIN — POTASSIUM CHLORIDE 20 MEQ: 1500 TABLET, EXTENDED RELEASE ORAL at 20:38

## 2022-04-28 RX ADMIN — IPRATROPIUM BROMIDE AND ALBUTEROL SULFATE 3 ML: .5; 3 SOLUTION RESPIRATORY (INHALATION) at 20:36

## 2022-04-28 RX ADMIN — ROPINIROLE HYDROCHLORIDE 0.5 MG: 0.5 TABLET, FILM COATED ORAL at 14:55

## 2022-04-28 RX ADMIN — CITALOPRAM HYDROBROMIDE 40 MG: 20 TABLET, FILM COATED ORAL at 09:13

## 2022-04-28 RX ADMIN — BUPROPION HYDROCHLORIDE 150 MG: 150 TABLET, EXTENDED RELEASE ORAL at 09:12

## 2022-04-28 RX ADMIN — PANTOPRAZOLE SODIUM 40 MG: 40 TABLET, DELAYED RELEASE ORAL at 09:11

## 2022-04-28 RX ADMIN — Medication 1 PACKET: at 09:20

## 2022-04-28 RX ADMIN — POTASSIUM CHLORIDE 40 MEQ: 1500 TABLET, EXTENDED RELEASE ORAL at 09:12

## 2022-04-28 RX ADMIN — SIMVASTATIN 40 MG: 40 TABLET, FILM COATED ORAL at 22:44

## 2022-04-28 RX ADMIN — ROPINIROLE HYDROCHLORIDE 0.5 MG: 0.5 TABLET, FILM COATED ORAL at 09:12

## 2022-04-28 RX ADMIN — FERROUS SULFATE TAB 325 MG (65 MG ELEMENTAL FE) 325 MG: 325 (65 FE) TAB at 09:12

## 2022-04-28 RX ADMIN — Medication 1 CAPSULE: at 22:43

## 2022-04-28 RX ADMIN — ASPIRIN 81 MG CHEWABLE TABLET 81 MG: 81 TABLET CHEWABLE at 20:38

## 2022-04-28 RX ADMIN — IPRATROPIUM BROMIDE AND ALBUTEROL SULFATE 3 ML: .5; 3 SOLUTION RESPIRATORY (INHALATION) at 11:21

## 2022-04-28 RX ADMIN — Medication 1 CAPSULE: at 09:23

## 2022-04-28 RX ADMIN — ROPINIROLE HYDROCHLORIDE 1 MG: 0.5 TABLET, FILM COATED ORAL at 22:44

## 2022-04-28 RX ADMIN — GABAPENTIN 400 MG: 300 CAPSULE ORAL at 16:31

## 2022-04-28 RX ADMIN — MICONAZOLE NITRATE: 20 POWDER TOPICAL at 20:39

## 2022-04-28 RX ADMIN — SPIRONOLACTONE 25 MG: 25 TABLET ORAL at 09:12

## 2022-04-28 RX ADMIN — B-COMPLEX W/ C & FOLIC ACID TAB 1 TABLET: TAB at 09:12

## 2022-04-28 RX ADMIN — Medication 1 PACKET: at 20:39

## 2022-04-28 RX ADMIN — PANTOPRAZOLE SODIUM 40 MG: 40 TABLET, DELAYED RELEASE ORAL at 16:31

## 2022-04-28 RX ADMIN — Medication 250 MCG: at 09:11

## 2022-04-28 RX ADMIN — IPRATROPIUM BROMIDE AND ALBUTEROL SULFATE 3 ML: .5; 3 SOLUTION RESPIRATORY (INHALATION) at 08:10

## 2022-04-28 RX ADMIN — GUAIFENESIN 10 ML: 100 SOLUTION ORAL at 15:09

## 2022-04-28 RX ADMIN — ACETAMINOPHEN 650 MG: 325 TABLET, FILM COATED ORAL at 16:33

## 2022-04-28 RX ADMIN — GUAIFENESIN 10 ML: 100 SOLUTION ORAL at 22:44

## 2022-04-28 RX ADMIN — MULTIPLE VITAMINS W/ MINERALS TAB 1 TABLET: TAB at 09:12

## 2022-04-28 RX ADMIN — MICONAZOLE NITRATE: 20 POWDER TOPICAL at 09:19

## 2022-04-28 RX ADMIN — FUROSEMIDE 40 MG: 40 TABLET ORAL at 09:12

## 2022-04-28 RX ADMIN — GABAPENTIN 400 MG: 300 CAPSULE ORAL at 09:12

## 2022-04-28 RX ADMIN — GABAPENTIN 400 MG: 300 CAPSULE ORAL at 22:44

## 2022-04-28 ASSESSMENT — ACTIVITIES OF DAILY LIVING (ADL)
ADLS_ACUITY_SCORE: 11
ADLS_ACUITY_SCORE: 15
ADLS_ACUITY_SCORE: 16
ADLS_ACUITY_SCORE: 16
ADLS_ACUITY_SCORE: 11
ADLS_ACUITY_SCORE: 16
ADLS_ACUITY_SCORE: 11
ADLS_ACUITY_SCORE: 16
ADLS_ACUITY_SCORE: 11
ADLS_ACUITY_SCORE: 16
ADLS_ACUITY_SCORE: 11
ADLS_ACUITY_SCORE: 16
ADLS_ACUITY_SCORE: 11
ADLS_ACUITY_SCORE: 15
ADLS_ACUITY_SCORE: 16

## 2022-04-28 NOTE — PLAN OF CARE
Goal Outcome Evaluation:    Plan of Care Reviewed With: patient, daughter     Overall Patient Progress: improving    Patient AxOx4, VSS on room air, lungs clear diminished with infrequent productive cough-clear/white thin sputum. HILLMAN after ambulated in halls x 2 this shift with assist of 1, GB/walker.  No c/o pain.  Good appetite-tolerates regular diet.  RLE wound changed per plan of care at 1230.  Edema wear on LLE. Serosanguinous drainage.  Voids on BSC, had BM, some urge incontinence. Calls appropriately.

## 2022-04-28 NOTE — PROGRESS NOTES
[facility]    Medicine Progress Note - Hospitalist Service        Date of Admission:  3/21/2022  7:14 AM    Assessment & Plan:   Elizabeth Kelley is a 76 year old female with PMHx of MS, chronic BLE lymphedema with venous insufficiency and chronic R leg cellulitis, GERD, hyperlipidemia, and depression who underwent a scheduled excisional debridement of RLE cicumferential venous hypertensive ulceration and application of wound vac per Dr. Bullock on 3/21/22.   Hospitalist service consulted 3/23/22 for AMS ultimately determined to have sepsis 2/2 CAP with respiratory failure.      Sepsis dt pneumonia vs aspiration pneumonia, recurrent: Resolved   Acute hypoxic respiratory failure dt above  Acute on chronic diastolic CHF exacerbation: Resolved  * On 3/23 Tmax 101.5, tachycardic in the 110s, hypoxic to 86% on room air. WBC 18.3, procal 0.17. CXR with patchy airspace opacities in the left mid and lower lung, suspicious for pneumonia. Treated with Zosyn for CAP, repeat CXR 3/28 with new right upper lobe infiltrate and basilar infiltrate lateral views. Received diuretics for volume overload, transitioned to Augmentin on 3/30 with plans for 14 day course to treat LE cellulitis.  * 4/3 with fever to 102, uptrending WBC with mildly elevated procalcitonin. C/O SOB, noted to be hypoxic. COVID-19, RSV, Influenza PCR neg. UA neg. CXR on 4/2 neg. LE wound valuated by vascular surgery, no evidence of infection. CTA Chest neg for PE, with bilateral pulmonary infiltrates L>R, most consistent with infectious etiology. Blood cultures x2 repeated remain negative, Resumed IV Zosyn, Vanco. ID consulted.   * 4/5 Video swallow negative, cleared for reg diet with instructions to sit upright, speech continuing to follow  * 4/6 Weaned to 1-2 L O2.  * Likely recurrent aspiration pneumonia 2/2 hiatal hernia  * Zosyn transitioned to Unasyn on 4/5  * ID consulted. Completed course of abx on 4/8.   * Diuresed with IV Lasix this stay  -- changed to oral Lasix (40mg po daily) on 4/15  -- cont Lasix 40mg po daily, spironolactone 25mg daily  -- cont sched duonebs, prn albuterol nebs   -- pulmonary toilet with IS, flutter valve, OOB as able.  Patient continues on 1 to 2 L nasal cannula oxygen.  Likely will need at discharge as well.  -- monitor Is/Os, fluid status  -- prns for cough  -- cont PPI, should remain upright for 2 hrs after meals     Afib with RVR, new diagnosis: Resolved  NSTEMI  * Had RRT on 4/4 for development of afib with RVR, rates 130s-150s in setting of sepsis. EKG with marked ST depression and TWI in lateral leads. Received IV diltiazem bolus with plan to transition to drip, developed three 3-second pauses with conversion to SR. Initiated on heparin drip. Remains in NSR since then. Troponin 17--98  * Lexiscan stress done 4/5/22 was negative for inducible ischemia  * TTE this stay with LVEF 60-65%, no WMAs, no valvular dz, mildly elevated PA pressure  * Seen by cardiology this stay --  ischemic workup neg, no indication for anticoagulation at this time given afib/RVR occurred in the setting of sepsis. If afib recurs, would rec AC and tx with amiodarone rather than diltiazem.     Encephalopathy, multifactorial: Resolved  * Altered mentation noted by nursing during hosptialization. Multifactorial in the setting of fever, narcotic pain medications, suspected underlying BOLIVAR, possible dehydration. Received dose of narcan, but did not significantly change pts mentation immediately, though seemed to improve within the hour after IVF bolus was near completion. Mental status continues to improved.  * Meds adjusted this stay -- Lyrica stopped, gabapentin increased  * Continues on Wellbutrin, Requip. Keep low threshold to stop meds if confusion recurs  -- cont to reorient  -- suspect some degree of underlying BOLIVAR -- should have sleep study once recovered from surgery     Acute blood loss anemia  Chronic normocytic anemia  * Baseline 10-11. Down  to 8.3 post-procedure. No active bleeding, some bleeding from wound bed with dressing changes. Could be dilutional as checked while receiving IVF bolus   * Received 1u PRBCs 3/25, 3/27, 4/6 for Hgb < 7  * NSAIDs (Celebrex) stopped this stay  * Iron studies this stay suggestive of iron deficiency -- started on oral iron supplement  * Hgb stable at 9 this stay     Hematuria: Resolved  Dysuria: Resolved  * Noted on 3/30. Likely dt trauma/irritation from garrison catheter.   * Had been on empiric abx as above. UA 4/1 neg.   * Sx resolved as of 3/31. Garrison removed 4/1.  * Repeat UA on 4/9 was unremarkable.     Chronic BLE lymphedema with venous insufficiency and chronic R leg cellulitis   S/P excisional debridement of RLE  ulceration and application of wound vac (3/21/22)  S/P intraoperative wound examination, wound debridement, and application of myriad 2 layer graft on 3/28  * Routine postop cares per Dr. Bullock including pain control  * Lyrica dc'd 4/5 as above, conts on gabapentin 400mg TID  * On diuretics as above     Low back pain/spasms  * Started 4/19. Exacerbated by lying in bed. Seem to improve when up in chair and ambulation  -- trial of heating pad vs ice pack, low dose flexeril prn     Severe Obesity  *  BMI 50.8 this stay. Follow up with PCP     MS  * Ambulatory at baseline, though has some some weakness in LEs and intermittent UE weakness.     GERD  * Chronic and stable on PPI.     Hyperlipidemia  * Chronic and stable on statin     Depression  * Resumed PTA Celexa, Wellbutrin and Lyrica on 3/26/22 (had been held for a few days given AMS)  * As above, weaned Lyrica off and solely on gabapentin as of 4/5.     RLS  * Chronic and stable on Requip.    Diet: Combination Diet Regular Diet; Thin Liquids (level 0) (No straws)     DVT Prophylaxis: Pneumatic Compression Devices   Garrison Catheter: Not present  Code Status: Full Code     Disposition Plan    Expected Discharge: Awaiting placement.  [facility](patient preference)  "versus [facility](awaiting insurance authorization)  Anticipated discharge location: home with family    Delays:     Placement - TCU          Entered: Jaskaran Leslie MD 04/28/2022, 10:57 AM        Clinically Significant Risk Factors Present on Admission                      The patient's care was discussed with the Bedside Nurse and Patient.    Jaskaran Leslie MD  Hospitalist Service  [facility]  Text Page 7AM-6PM  Securely message with the Vocera Web Console (learn more here)  Text page via [employer] Paging/Directory    ______________________________________________________________________    Interval History   Patient denies new complaints.  Chronic back pain unchanged.  Denies diarrhea.  Awaiting placement to [facility] versus long-term acute care at [facility]    Data reviewed today: I reviewed all medications, new labs and imaging results over the last 24 hours. I personally reviewed no images or EKG's today.    Physical Exam   Vital signs:  Temp: 98.4  F (36.9  C) Temp src: Oral BP: (!) 145/71 Pulse: 85   Resp: 18 SpO2: 93 % O2 Device: None (Room air) Oxygen Delivery: 1 LPM Height: 154.9 cm (5' 1\") Weight: 120.6 kg (265 lb 14 oz)  Estimated body mass index is 50.24 kg/m  as calculated from the following:    Height as of this encounter: 1.549 m (5' 1\").    Weight as of this encounter: 120.6 kg (265 lb 14 oz).      Wt Readings from Last 2 Encounters:   04/28/22 120.6 kg (265 lb 14 oz)   03/10/22 120.6 kg (265 lb 12.8 oz)       Gen: AAOX3, NAD  Resp: CTA B/L, normal WOB  CVS: RRR, no murmur  Abd/GI: Soft, non-tender. BS- normoactive.    Skin: b/l LE wrapped  Neuro- CN- intact. No focal deficits.      Data   No lab results found in last 7 days.    No results found for this or any previous visit (from the past 24 hour(s)).  Medications     Reason anticoagulant not prescribed for atrial fibrillation       - MEDICATION INSTRUCTIONS -       - MEDICATION INSTRUCTIONS -         aspirin  81 mg Oral QPM     " buPROPion  150 mg Oral QAM     citalopram  40 mg Oral QAM     cyanocobalamin  1,000 mcg Oral Daily     diosmin-hesperidin 450-50  1 capsule Oral BID     ferrous sulfate  325 mg Oral Daily     furosemide  40 mg Oral Daily     gabapentin  400 mg Oral TID     ipratropium - albuterol 0.5 mg/2.5 mg/3 mL  3 mL Nebulization Q4H While awake     Aleksandr  1 packet Oral BID     miconazole   Topical BID     multivitamin w/minerals  1 tablet Oral Daily     pantoprazole  40 mg Oral BID AC     polyethylene glycol  17 g Oral Daily     potassium chloride ER  20 mEq Oral QPM     potassium chloride ER  40 mEq Oral QAM     rOPINIRole  0.5 mg Oral BID     rOPINIRole  1 mg Oral At Bedtime     senna-docusate  1 tablet Oral BID     simvastatin  40 mg Oral At Bedtime     spironolactone  25 mg Oral Daily     vitamin B complex with vitamin C  1 tablet Oral Daily     Vitamin D3  250 mcg Oral Daily

## 2022-04-28 NOTE — PROGRESS NOTES
Pt placed on CPAP for the night. Refused neb tx. mepilex and liquicell applied on skin. Tolerated Bipap. Will continue to monitor.    Mary Carmen Dawson, RT

## 2022-04-28 NOTE — PLAN OF CARE
DATE & TIME: 4/27/22 1900-4/28/22 6101  Cognitive Concerns/ Orientation: A&Ox4; calm and cooperative  BEHAVIOR & AGGRESSION TOOL COLOR: Green  ABNL VS/O2: VSS on 1L NC. Uses CPAP at night  MOBILITY: Ax1 with walker, refusing GB in room.   PAIN MANAGMENT: Chronic back pain, PRN Tylenol given x1.  DIET: Regular diet, no straws- aspiration precautions.   BOWEL/BLADDER: Using commode during daytime, incontinent at times.  Purewick at night. No BM this shift.  ABNL LAB: None new  DRAIN/DEVICES: No IV access  TELEMETRY RHYTHM: N/A  SKIN: Wound care completed on RLE, dressing CDI. Michelle area reddened, protocol followed   TESTS/PROCEDURES: N/A  D/C DAY/GOALS/PLACE: TBD, awaiting options on TCU placement, SW/CC following.  OTHER IMPORTANT INFO: Contact precautions maintained for hx of MRSA. PT/WOC following. Infrequent cough, some HILLMAN.  PRN Robitussin given x1. Motivated to walk and increase strength.

## 2022-04-28 NOTE — PROGRESS NOTES
Spoke with insurance company in an appeal today, physician for you Stephanie, explained situation of no bed availability at a care facility for ongoing cares, patient has been deemed not safe to return home at this point, the denial has been rescinded and hospital days approved as are care coordinators diligently search for a excepting facility for ongoing cares.  Patient has near completely epithelialized right lower extremity chronic venous hypertensive ulceration, will ultimately plan to initiate 2 layer wraps for consolidation of dermal regeneration through the wound clinic at the time of discharge, awaiting final discharge plans, discussed with patient.  Ara

## 2022-04-29 VITALS
WEIGHT: 264.11 LBS | RESPIRATION RATE: 18 BRPM | TEMPERATURE: 98.4 F | OXYGEN SATURATION: 91 % | HEART RATE: 80 BPM | BODY MASS INDEX: 49.86 KG/M2 | SYSTOLIC BLOOD PRESSURE: 143 MMHG | DIASTOLIC BLOOD PRESSURE: 65 MMHG | HEIGHT: 61 IN

## 2022-04-29 PROCEDURE — 94640 AIRWAY INHALATION TREATMENT: CPT

## 2022-04-29 PROCEDURE — 250N000009 HC RX 250: Performed by: SURGERY

## 2022-04-29 PROCEDURE — 94640 AIRWAY INHALATION TREATMENT: CPT | Mod: 76

## 2022-04-29 PROCEDURE — 250N000013 HC RX MED GY IP 250 OP 250 PS 637: Performed by: SURGERY

## 2022-04-29 PROCEDURE — 94660 CPAP INITIATION&MGMT: CPT

## 2022-04-29 PROCEDURE — 99239 HOSP IP/OBS DSCHRG MGMT >30: CPT | Performed by: STUDENT IN AN ORGANIZED HEALTH CARE EDUCATION/TRAINING PROGRAM

## 2022-04-29 PROCEDURE — 250N000013 HC RX MED GY IP 250 OP 250 PS 637: Performed by: PHYSICIAN ASSISTANT

## 2022-04-29 PROCEDURE — 250N000013 HC RX MED GY IP 250 OP 250 PS 637: Performed by: INTERNAL MEDICINE

## 2022-04-29 PROCEDURE — 999N000157 HC STATISTIC RCP TIME EA 10 MIN

## 2022-04-29 PROCEDURE — 250N000013 HC RX MED GY IP 250 OP 250 PS 637: Performed by: HOSPITALIST

## 2022-04-29 RX ORDER — HYDROMORPHONE HYDROCHLORIDE 2 MG/1
2 TABLET ORAL EVERY 4 HOURS PRN
Qty: 12 TABLET | Refills: 0 | Status: SHIPPED | OUTPATIENT
Start: 2022-04-29 | End: 2022-04-29

## 2022-04-29 RX ORDER — FUROSEMIDE 40 MG
40 TABLET ORAL DAILY
DISCHARGE
Start: 2022-04-30 | End: 2024-07-16

## 2022-04-29 RX ORDER — FERROUS SULFATE 325(65) MG
325 TABLET ORAL DAILY
DISCHARGE
Start: 2022-04-30 | End: 2024-07-16

## 2022-04-29 RX ORDER — CYCLOBENZAPRINE HCL 10 MG
10 TABLET ORAL EVERY 8 HOURS PRN
DISCHARGE
Start: 2022-04-29 | End: 2024-07-16

## 2022-04-29 RX ORDER — HYDROMORPHONE HYDROCHLORIDE 2 MG/1
2 TABLET ORAL EVERY 4 HOURS PRN
Qty: 12 TABLET | Refills: 0 | Status: SHIPPED | OUTPATIENT
Start: 2022-04-29 | End: 2024-07-16

## 2022-04-29 RX ORDER — BENZONATATE 100 MG/1
100 CAPSULE ORAL 3 TIMES DAILY PRN
DISCHARGE
Start: 2022-04-29 | End: 2024-09-04

## 2022-04-29 RX ORDER — CALCIUM CARBONATE 500 MG/1
1 TABLET, CHEWABLE ORAL DAILY PRN
DISCHARGE
Start: 2022-04-29 | End: 2024-07-16

## 2022-04-29 RX ORDER — PANTOPRAZOLE SODIUM 40 MG/1
40 TABLET, DELAYED RELEASE ORAL
DISCHARGE
Start: 2022-04-29 | End: 2024-07-16

## 2022-04-29 RX ADMIN — ROPINIROLE HYDROCHLORIDE 0.5 MG: 0.5 TABLET, FILM COATED ORAL at 13:48

## 2022-04-29 RX ADMIN — FUROSEMIDE 40 MG: 40 TABLET ORAL at 09:23

## 2022-04-29 RX ADMIN — Medication 1 PACKET: at 09:22

## 2022-04-29 RX ADMIN — FERROUS SULFATE TAB 325 MG (65 MG ELEMENTAL FE) 325 MG: 325 (65 FE) TAB at 09:22

## 2022-04-29 RX ADMIN — BUPROPION HYDROCHLORIDE 150 MG: 150 TABLET, EXTENDED RELEASE ORAL at 09:22

## 2022-04-29 RX ADMIN — ROPINIROLE HYDROCHLORIDE 0.5 MG: 0.5 TABLET, FILM COATED ORAL at 09:22

## 2022-04-29 RX ADMIN — IPRATROPIUM BROMIDE AND ALBUTEROL SULFATE 3 ML: .5; 3 SOLUTION RESPIRATORY (INHALATION) at 11:27

## 2022-04-29 RX ADMIN — B-COMPLEX W/ C & FOLIC ACID TAB 1 TABLET: TAB at 09:24

## 2022-04-29 RX ADMIN — PANTOPRAZOLE SODIUM 40 MG: 40 TABLET, DELAYED RELEASE ORAL at 09:23

## 2022-04-29 RX ADMIN — Medication 1 CAPSULE: at 10:29

## 2022-04-29 RX ADMIN — CITALOPRAM HYDROBROMIDE 40 MG: 20 TABLET, FILM COATED ORAL at 09:22

## 2022-04-29 RX ADMIN — POTASSIUM CHLORIDE 40 MEQ: 1500 TABLET, EXTENDED RELEASE ORAL at 09:23

## 2022-04-29 RX ADMIN — SPIRONOLACTONE 25 MG: 25 TABLET ORAL at 09:22

## 2022-04-29 RX ADMIN — Medication 250 MCG: at 09:23

## 2022-04-29 RX ADMIN — MICONAZOLE NITRATE: 20 POWDER TOPICAL at 10:29

## 2022-04-29 RX ADMIN — CYANOCOBALAMIN TAB 1000 MCG 1000 MCG: 1000 TAB at 09:23

## 2022-04-29 RX ADMIN — MULTIPLE VITAMINS W/ MINERALS TAB 1 TABLET: TAB at 09:24

## 2022-04-29 RX ADMIN — GABAPENTIN 400 MG: 300 CAPSULE ORAL at 09:24

## 2022-04-29 ASSESSMENT — ACTIVITIES OF DAILY LIVING (ADL)
ADLS_ACUITY_SCORE: 14
ADLS_ACUITY_SCORE: 15
ADLS_ACUITY_SCORE: 14
ADLS_ACUITY_SCORE: 15
ADLS_ACUITY_SCORE: 14

## 2022-04-29 NOTE — PLAN OF CARE
DATE & TIME: 4/28/22 Evenings   Cognitive Concerns/ Orientation: A&Ox4, calls as needed  BEHAVIOR & AGGRESSION TOOL COLOR: Green  ABNL VS/O2: VSS on RA. Freq cough. Guaifenesin given x1 at bedtime.  CPAP at night  MOBILITY: Ax1 with walker, refusing GB in room. Up to BSC. In chair during day.  PAIN MANAGMENT: Chronic back pain, PRN Tylenol given x1. RLE tender with wound care.   DIET: Regular diet, no straws- aspiration precautions. Good appetite and fluid intake.   BOWEL/BLADDER: Using commode during daytime, incontinent at times.  Purewick at night. No BM this shift.  ABNL LAB: No new  DRAIN/DEVICES: No IV access  TELEMETRY RHYTHM: N/A  SKIN: Wound care completed on RLE. Michelle area reddened, cares provided. Pt able to instruct.   TESTS/PROCEDURES: N/A  D/C DAY/GOALS/PLACE: TBD, awaiting options on TCU placement, SW/CC following.  OTHER IMPORTANT INFO: Contact precautions maintained for hx of MRSA. PT/WOC following. Infrequent cough, some HILLMAN. Calls as needed.

## 2022-04-29 NOTE — PLAN OF CARE
Goal Outcome Evaluation:        DATE & TIME: 04/29/2022 [id number]  Cognitive Concerns/ Orientation : Alert/Oriented x 4   BEHAVIOR & AGGRESSION TOOL COLOR: Green  ABNL VS/O2: mid to low 90' Johnny, VSS,  MOBILITY: Assist-1 gait belt/walker  PAIN MANAGMENT: Denies  DIET: Regular, Thin liquids-no straws  BOWEL/BLADDER:Bedside commode during day, incontinent at times  ABNL LAB/BG: WNL; COVID negative (4/27)  DRAIN/DEVICES: No IV access  SKIN: R leg chinedu/red covered with gauze dressing-changed this morning, Michelle-area/inner thighs/bottom excoriated/ reddened  TESTS/PROCEDURES: n/a  D/C DATE: Pending, to TCU  OTHER IMPORTANT INFO: Contact precautions maintained for MRSA history.  Possible discharge to [facility] today

## 2022-04-29 NOTE — PROGRESS NOTES
CPAP/BiPAP Settings  IPAP: 10  EPAP: 5  Rate: 14  FiO2: 30  Timed Inspiration (sec): .9    CPAP/BiPAP/AVAPS/AVAPS AE Alarms  High Pressure: 25  Low Pressure: 5  Low Pressure Delay: 20  High Rate (breaths/min): 45  Low Rate (breaths/min): 5  Alarm Volume Level: 10    CPAP/BiPAP/AVAPS/AVAPS AE Patient Assessment  Skin Assessment: intact  Barriers Applied: liquicell   Lung Sounds: diminished     Plan:  Continue BIPAP as needed. Will continue to follow.

## 2022-04-29 NOTE — DISCHARGE SUMMARY
[facility]  Hospitalist Discharge Summary      Date of Admission:  3/21/2022  Date of Discharge:  4/29/2022  Discharging Provider: Yadiel Zacarias MD  Discharge Service: Hospitalist Service    Discharge Diagnoses     Sepsis 2/2 aspiration pneumonia  Acute hypoxic respiratory failure dt above  Acute on chronic diastolic CHF exacerbation  Chronic BLE lymphedema with venous insufficiency and chronic R leg cellulitis     Follow-ups Needed After Discharge   Follow-up Appointments     Follow Up and recommended labs and tests      Follow up with jail physician.  The following labs/tests are   recommended: none.             Unresulted Labs Ordered in the Past 30 Days of this Admission     No orders found from 2/19/2022 to 3/22/2022.        Discharge Disposition   Discharged to long-term care facility  Condition at discharge: Stable    Hospital Course      Elizabeth Kelley is a 76 year old female with PMHx of MS, chronic BLE lymphedema with venous insufficiency and chronic R leg cellulitis, GERD, hyperlipidemia, and depression who underwent a scheduled excisional debridement of RLE cicumferential venous hypertensive ulceration and application of wound vac per Dr. Bullock on 3/21/22.   Hospitalist service consulted 3/23/22 for AMS ultimately determined to have sepsis 2/2 CAP with respiratory failure.      Sepsis 2/2 aspiration pneumonia  Acute hypoxic respiratory failure dt above  Acute on chronic diastolic CHF exacerbation  * On 3/23 Tmax 101.5, tachycardic in the 110s, hypoxic to 86% on room air. WBC 18.3, procal 0.17. CXR with patchy airspace opacities in the left mid and lower lung, suspicious for pneumonia. Treated with Zosyn for CAP, repeat CXR 3/28 with new right upper lobe infiltrate and basilar infiltrate lateral views. Received diuretics for volume overload, transitioned to Augmentin on 3/30 with plans for 14 day course to treat LE cellulitis.  * 4/3 with fever to 102, uptrending WBC with  mildly elevated procalcitonin. C/O SOB, noted to be hypoxic. COVID-19, RSV, Influenza PCR neg. UA neg. CXR on 4/2 neg. LE wound valuated by vascular surgery, no evidence of infection. CTA Chest neg for PE, with bilateral pulmonary infiltrates L>R, most consistent with infectious etiology. Blood cultures x2 repeated remain negative, Resumed IV Zosyn, Vanco. ID consulted.   * 4/5 Video swallow negative, cleared for reg diet with instructions to sit upright, speech continuing to follow  * 4/6 Weaned to 1-2 L O2.  * Likely recurrent aspiration pneumonia 2/2 hiatal hernia  * Zosyn transitioned to Unasyn on 4/5  * ID consulted. Completed course of abx on 4/8.   * Diuresed with IV Lasix this stay -- changed to oral Lasix (40mg po daily) on 4/15  -- cont Lasix 40mg po daily, spironolactone 25mg daily  -- cont sched duonebs, prn albuterol nebs   -- pulmonary toilet with IS, flutter valve, OOB as able.  Patient continues on 1 to 2 L nasal cannula oxygen.  Likely will need at discharge as well.  -- prns for cough  -- cont PPI, should remain upright for 2 hrs after meals     Afib with RVR, new diagnosis: Resolved  NSTEMI  * Had RRT on 4/4 for development of afib with RVR, rates 130s-150s in setting of sepsis. EKG with marked ST depression and TWI in lateral leads. Received IV diltiazem bolus with plan to transition to drip, developed three 3-second pauses with conversion to SR. Initiated on heparin drip. Remains in NSR since then. Troponin 17--98  * Lexiscan stress done 4/5/22 was negative for inducible ischemia  * TTE this stay with LVEF 60-65%, no WMAs, no valvular dz, mildly elevated PA pressure  * Seen by cardiology this stay --  ischemic workup neg, no indication for anticoagulation at this time given afib/RVR occurred in the setting of sepsis.      Encephalopathy, multifactorial: Resolved  * Altered mentation noted by nursing during hosptialization. Multifactorial in the setting of fever, narcotic pain medications,  suspected underlying BOLIVAR, possible dehydration. Received dose of narcan, but did not significantly change pts mentation immediately, though seemed to improve within the hour after IVF bolus was near completion. Mental status continues to improved.  * Meds adjusted this stay -- Lyrica stopped, gabapentin increased  * Continues on Wellbutrin, Requip. Keep low threshold to stop meds if confusion recurs     Acute blood loss anemia  Chronic normocytic anemia  * Baseline 10-11. Down to 8.3 post-procedure. No active bleeding, some bleeding from wound bed with dressing changes. Could be dilutional as checked while receiving IVF bolus   * Received 1u PRBCs 3/25, 3/27, 4/6 for Hgb < 7  * NSAIDs (Celebrex) stopped this stay  * Iron studies this stay suggestive of iron deficiency -- started on oral iron supplement  * Hgb stable at discharge     Hematuria: Resolved  Dysuria: Resolved  * Noted on 3/30. Likely dt trauma/irritation from garrison catheter.   * Had been on empiric abx as above. UA 4/1 neg.   * Sx resolved as of 3/31. Garrison removed 4/1.  * Repeat UA on 4/9 was unremarkable.     Chronic BLE lymphedema with venous insufficiency and chronic R leg cellulitis   S/P excisional debridement of RLE  ulceration and application of wound vac (3/21/22)  S/P intraoperative wound examination, wound debridement, and application of myriad 2 layer graft on 3/28  * Routine postop cares per Dr. Bullock including pain control  * Lyrica dc'd 4/5 as above, conts on gabapentin 400mg TID  * On diuretics as above     Low back pain/spasms  * Started 4/19. Exacerbated by lying in bed. Seem to improve when up in chair and ambulation  -- trial of heating pad vs ice pack, low dose flexeril prn     Severe Obesity  *  BMI 50.8 this stay. Follow up with PCP     MS  * Ambulatory at baseline, though has some some weakness in LEs and intermittent UE weakness.     GERD  * Chronic and stable on PPI.     Hyperlipidemia  * Chronic and stable on  statin     Depression  * Resumed PTA Celexa, Wellbutrin and Lyrica on 3/26/22 (had been held for a few days given AMS)  * As above, weaned Lyrica off and solely on gabapentin as of 4/5.     RLS  * Chronic and stable on Requip.    Consultations This Hospital Stay   HOSPITALIST IP CONSULT  PHARMACY TO DOSE VANCO  PHARMACY TO DOSE VANCO  SPEECH LANGUAGE PATH ADULT IP CONSULT  PHYSICAL THERAPY ADULT IP CONSULT  WOUND OSTOMY CONTINENCE NURSE  IP CONSULT  PHYSICAL THERAPY ADULT IP CONSULT  VASCULAR ACCESS ADULT IP CONSULT  CARE MANAGEMENT / SOCIAL WORK IP CONSULT  VASCULAR ACCESS ADULT IP CONSULT  INFECTIOUS DISEASES IP CONSULT  PHARMACY TO DOSE VANCO  PHARMACY IP CONSULT  PHARMACY IP CONSULT  CARDIOLOGY IP CONSULT  SPEECH LANGUAGE PATH ADULT IP CONSULT  WOUND OSTOMY CONTINENCE NURSE  IP CONSULT  WOUND OSTOMY CONTINENCE NURSE  IP CONSULT  CARE MANAGEMENT / SOCIAL WORK IP CONSULT  PHYSICAL THERAPY ADULT IP CONSULT  OCCUPATIONAL THERAPY ADULT IP CONSULT    Code Status   Full Code    Time Spent on this Encounter   I, Yadiel Zacarias MD, personally saw the patient today and spent greater than 30 minutes discharging this patient.       Yadiel Zacarias MD  [place] [address] [facility]  [facility] [address]  [place] [address]  Phone: [phone]  ______________________________________________________________________    Physical Exam   Vital Signs: Temp: 98.4  F (36.9  C) Temp src: Oral BP: (!) 143/65 Pulse: 80   Resp: 18 SpO2: 91 % O2 Device: None (Room air)    Weight: 264 lbs 1.78 oz       Primary Care Physician   Francisco Ramirez    Discharge Orders      Follow-Up with Cardiology MILO      Medication Therapy Management Referral      General info for SNF    Length of Stay Estimate: Long Term Care  Condition at Discharge: Improving  Level of care:board and care  Rehabilitation Potential: Fair  Admission H&P remains valid and up-to-date: Yes  Recent Chemotherapy: N/A  Use Nursing Home Standing Orders: Yes      Mantoux instructions    Give two-step Mantoux (PPD) Per Facility Policy Yes     Follow Up and recommended labs and tests    Follow up with jail physician.  The following labs/tests are recommended: none.     Reason for your hospital stay    You had a right leg wound that needed surgical intervention and had pneumonia needing IV antibiotics.     Intake and output    Every shift     Daily weights    Call Provider for weight gain of more than 2 pounds per day or 5 pounds per week.     Wound care    Site:   RLE  Instructions:  BID Vashe-moist Kerlix dressing changes     Activity - Up with assistive device     Full Code     Physical Therapy Adult Consult    Evaluate and treat as clinically indicated.    Reason:  physical deconditioning     Occupational Therapy Adult Consult    Evaluate and treat as clinically indicated.    Reason:  physical deconditioning     Leadless EKG Monitor 3 to 14 Days     Fall precautions     Oxygen Adult/Peds    Oxygen Documentation:   I certify that this patient, Elizabeth Kelley has been under my care (or a nurse practitioner or physican's assistant working with me). This is the face-to-face encounter for oxygen medical necessity.      Elizabeth Kelley is now in a chronic stable state and continues to require supplemental oxygen. Patient has continued oxygen desaturation due to Chronic Respiratory Failure with Hypoxia J93.11.    Alternative treatment(s) tried or considered and deemed clinically infective for treatment of Chronic Respiratory Failure with Hypoxia J93.11 include nebulizers, inhalers, pulmonary toileting, and pulmonary rehab.  If portability is ordered, is the patient mobile within the home? yes    **Patients who qualify for home O2 coverage under the CMS guidelines require ABG tests or O2 sat readings obtained closest to, but no earlier than 2 days prior to the discharge, as evidence of the need for home oxygen therapy. Testing must be performed while patient is in the  chronic stable state. See notes for O2 sats.**     Diet    Follow this diet upon discharge: Orders Placed This Encounter      Combination Diet Regular Diet; Thin Liquids (level 0) (No straws)       Significant Results and Procedures   Most Recent 3 CBC's:Recent Labs   Lab Test 04/14/22  0807 04/11/22  0908 04/09/22  0742 04/07/22  1434   WBC 10.7  --  10.1 11.6*   HGB 9.4* 8.9* 8.5* 8.8*   MCV 90  --  89 89   *  --  443 428     Most Recent 3 BMP's:Recent Labs   Lab Test 04/21/22  0900 04/21/22  0858 04/20/22  1116 04/19/22  0809     --  136 137   POTASSIUM 3.9  --  4.0 4.0   CHLORIDE 104  --  104 104   CO2 31  --  30 32   BUN 30  --  27 24   CR 0.86  --  0.98 0.92   ANIONGAP 4  --  2* 1*   JUNI 9.3  --  9.4 9.4   GLC 98 97 115* 104*     Most Recent 2 LFT's:Recent Labs   Lab Test 04/04/22  0523 03/23/22  1814   AST 26 63*   ALT 31 29   ALKPHOS 279* 156*   BILITOTAL 0.4 0.2     Most Recent 3 INR's:No lab results found.  Most Recent Cholesterol Panel:Recent Labs   Lab Test 11/10/21  1551   CHOL 161   LDL 68   HDL 61   TRIG 162*     Most Recent Urinalysis:Recent Labs   Lab Test 04/09/22  1512   COLOR Yellow   APPEARANCE Slightly Cloudy*   URINEGLC Negative   URINEBILI Negative   URINEKETONE Negative   SG 1.016   UBLD Negative   URINEPH 7.0   PROTEIN 10 *   NITRITE Negative   LEUKEST Moderate*   RBCU 2   WBCU 13*   ,   Results for orders placed or performed during the hospital encounter of 03/21/22   XR Chest Port 1 View    Narrative    CHEST ONE VIEW PORTABLE   3/23/2022 4:32 PM     HISTORY:  Lethargy.    COMPARISON: None.      Impression    IMPRESSION: Shallow inspiration accentuates heart size and pulmonary  vascularity. Patchy airspace opacities in the left mid and lower lung  are suspicious for pneumonia. No pneumothorax. Degenerative changes  right shoulder.    EFREN MONDRAGON MD         SYSTEM ID:  [id number]   XR Chest Port 1 View    Narrative    EXAM: XR CHEST PORT 1 VIEW  LOCATION: [facility]  DATE/TIME: 3/24/2022 1:15 AM    INDICATION: Concern for volume overload is status post IV fluid resuscitation.  COMPARISON: 10/13/2021      Impression    IMPRESSION: Normal heart size and pulmonary vascularity. No evidence for overt CHF/I'm overload. Minimal linear atelectasis left lung base. Mild tortuous aorta. No overt osseous abnormality.   XR Chest 2 Views    Narrative    CHEST TWO VIEWS 3/28/2022 1:54 PM     HISTORY: Pneumonia    COMPARISON: 3/24/2022       Impression    IMPRESSION: New right upper lobe infiltrate. Basilar infiltrate seen  on lateral view, probably on the left. The cardiac silhouette is not  enlarged. Pulmonary vasculature is unremarkable.    TIMMY GLASS MD         SYSTEM ID:  [id number]   XR Chest Port 1 View    Narrative    EXAM: XR CHEST PORT 1 VIEW  LOCATION: [facility]  DATE/TIME: 4/2/2022 3:24 PM    INDICATION: Fever, hypoxia.  COMPARISON: 3/24/2022.      Impression    IMPRESSION: Negative chest.   CT Chest (PE) Abdomen Pelvis w Contrast    Narrative    EXAM: CT CHEST PE ABDOMEN PELVIS W CONTRAST  LOCATION: [facility]  DATE/TIME: 4/3/2022 5:29 PM    INDICATION: PE suspected, low/intermediate probability, positive D-dimer. Abdominal Pain. Leukocytosis. Fever.  COMPARISON: Chest x-rays dated 04/02/2022 and CT pelvis dated 09/14/2019.  TECHNIQUE: CT chest pulmonary angiogram and routine CT abdomen pelvis with IV contrast. Arterial phase through the chest and venous phase through the abdomen and pelvis. Multiplanar reformats and MIP reconstructions were performed. Dose reduction   techniques were used.   CONTRAST: 135 mL [id number].    FINDINGS:  ANGIOGRAM CHEST: Pulmonary arteries are normal caliber and negative for pulmonary emboli. Thoracic aorta is negative for dissection. No CT evidence of right heart strain.     LUNGS AND PLEURA: Airspace infiltrates in all lobes of both lungs, worst in the lower lobes of the  bilateral lower lungs with the largest most confluent consolidation in the left lower lung lobe and the next largest in the medial right lower lung lobe.   These areas could obscure nodularity. Lungs are otherwise grossly clear. No pneumothorax or significant right pleural fluid collection is identified. There is a small left pleural effusion.    MEDIASTINUM/AXILLAE: The heart is normal in size. There is a moderate to large size hiatal hernia containing most of the gastric cardia and fundus. No mediastinal, hilar, or axillary lymphadenopathy is identified although there are mildly prominent AP   window lymph nodes measuring up to 0.8 cm in short axis, pretracheal lymph nodes measuring up to 0.9 cm in short axis, subcarinal lymph node measuring up to 0.9 cm in short axis, right hilar lymph nodes measuring up to almost 1 cm in short axis and left   hilar lymph nodes are somewhat difficult to evaluate given the consolidation in the left lower lung lobe. Visualized portions of the thyroid are unremarkable. Thoracic aorta is of normal caliber. There are few atherosclerotic calcifications in the aorta.    CORONARY ARTERY CALCIFICATION: No significant coronary artery calcifications are identified.    HEPATOBILIARY: The liver enhances normally without focal lesion, biliary ductal dilatation or choledocholithiasis. Gallbladder is grossly normal in appearance without evidence for cholelithiasis.    PANCREAS: Normal.    SPLEEN: Normal.    ADRENAL GLANDS: Normal.    KIDNEYS/BLADDER: Normal.    BOWEL: There are scattered diverticula in the sigmoid and descending colon without pericolonic inflammatory change to suggest acute diverticulitis. A moderate amount of stool is seen in the colon. Appendix is normal in appearance. Small bowel is of   normal caliber and appearance. The stomach contains a moderate amount of fluid and air. The gastric cardia and fundus is in the posterior mediastinum extending through a moderate to large  size hiatal hernia.    LYMPH NODES: Normal.    VASCULATURE: There is mild nonaneurysmal atherosclerosis.    PELVIC ORGANS: Atrophic uterus is grossly unremarkable. Large calcified fibroid is again noted, similar to the prior CT pelvis dated 2019. Ovaries are not well seen. No adnexal masses are identified.    MUSCULOSKELETAL: There are degenerative changes in the spine, shoulders, hips, and right sacroiliac joint. No aggressive osseous lesions or acute osseous fractures are identified.    ADDITIONAL FINDINGS: No free intraperitoneal air or free fluid is identified. There is edema in the adipose tissues around the lateral aspects of the lower abdomen and around the anterior and lateral aspects of the pelvis consistent with mild anasarca.      Impression    IMPRESSION:  1.  Bilateral pulmonary infiltrates have an appearance most consistent with infectious etiology although could also be inflammatory. These are worst in the left lower lung lobe and next worse in the right lower lung lobe. Mildly prominent lymph nodes in   the mediastinum and hilar regions are likely reactive to the pulmonary processes. Follow-up CT chest is recommended after appropriate clinical treatment and resolution of the clinical symptoms to ensure resolution of the pulmonary and mediastinal   findings.  2.  No evidence for pulmonary artery embolism.  3.  Moderate size hiatal hernia with extension of the gastric cardia and a portion of the gastric fundus in the posterior inferior mediastinum.  4.  Mild anasarca.  5.  No other etiology for patient's symptoms is identified.   XR Video Swallow with SLP or OT    Narrative    VIDEO SWALLOWING EVALUATION   4/5/2022 2:56 PM     HISTORY: recurrent pneumonia rule out aspiration.    COMPARISON: None.    FLUOROSCOPY TIME: 1.7 minutes.  SPOT IMAGES OR CINE RUNS: 14      Impression    Impression:  Thin: Premature spillage to the piriform sinuses. There was moderate  to deep penetration with cup and straw but  no definite aspiration.  Possible mild improvement in the degree of penetration with chin tuck  maneuver.    Mildly thick: Mild flash penetration with cup. Otherwise, no  penetration or aspiration.    Pudding: No penetration or aspiration.    Semisolid: No penetration or aspiration.    Solid: No penetration or aspiration.    This study only includes the cervical esophagus. Please see separate  report from speech pathology for additional details.    TIMMY ORTEGA MD         SYSTEM ID:  [id number]   XR Chest Port 1 View    Narrative    EXAM: XR CHEST PORTABLE 1 VIEW  LOCATION: [facility]  DATE/TIME: 2022, 9:25 AM    INDICATION: Hypoxia.  COMPARISON: CT chest, abdomen and pelvis on 2022.      Impression    IMPRESSION: Single AP view of the chest was obtained. Cardiomediastinal silhouette is within normal limits. Small left pleural effusion and associated basilar atelectasis/consolidation. No significant right pleural effusion. No significant pneumothorax.     Echocardiogram Complete     Value    LVEF  60-65%    Narrative    [id number]  [id number]  [id number]  [id number]^MARCH^KIMBERLEY^RAULITO     [facility]  Echocardiography Laboratory  [address] [place] [address]     Name: LELO ALVAREZ  MRN: [id number]  : 1945  Study Date: 2022 03:10 PM  Age: 76 yrs  Gender: Female  Patient [facility]  Reason For Study: SOB  Ordering Physician: KIMBERLEY KELLER  Referring Physician: Francisco Ramirez  Performed By: Lars Tanner RDCS     BSA: 2.2 m2  Height: 61 in  Weight: 286 lb  HR: 79  BP: 140/72 mmHg  ______________________________________________________________________________  Procedure  Complete Portable Echo Adult. Optison (NDC #1249-8608) given intravenously.  Limited Portable Echo Adult.  ______________________________________________________________________________  Interpretation Summary     1. Normal biventricular size and function.  Left ventricular ejection fraction  of 60-65%. No segmental wall motion abnormalities noted.  2. No hemodynamically significant valvular disease.  4. Mildly elevated pulmonary artery pressure.  No prior study for comparison. Technically adequate study.  ______________________________________________________________________________  Left Ventricle  The left ventricle is normal in size. There is normal left ventricular wall  thickness. Left ventricular systolic function is normal. The visual ejection  fraction is 60-65%. Grade II or moderate diastolic dysfunction.     Right Ventricle  The right ventricle is normal in size and function.     Atria  The left atrium is mildly dilated. Right atrial size is normal.     Mitral Valve  The mitral valve leaflets appear normal. There is no evidence of stenosis,  fluttering, or prolapse. There is trace mitral regurgitation.     Tricuspid Valve  Normal tricuspid valve. There is trace to mild tricuspid regurgitation. The  right ventricular systolic pressure is approximated at 33.5 mmHg plus the  right atrial pressure.     Aortic Valve  The aortic valve is not well visualized. There is trace aortic regurgitation.     Pulmonic Valve  The pulmonic valve is not well visualized.     Vessels  Normal size aorta. Normal size ascending aorta. IVC diameter <2.1 cm  collapsing >50% with sniff suggests a normal RA pressure of 3 mmHg.     Pericardium  There is no pericardial effusion.     Rhythm  Sinus rhythm was noted.  ______________________________________________________________________________  MMode/2D Measurements & Calculations  IVSd: 1.1 cm     LVIDd: 4.3 cm  LVIDs: 2.3 cm  LVPWd: 1.2 cm  FS: 45.4 %  LV mass(C)d: 171.2 grams  LV mass(C)dI: 77.9 grams/m2  Ao root diam: 3.1 cm  LA dimension: 3.4 cm  asc Aorta Diam: 3.0 cm  LA/Ao: 1.1  LVOT diam: 2.1 cm  LVOT area: 3.4 cm2  LA Volume (BP): 53.9 ml  LA Volume Index (BP): 24.5 ml/m2  RWT: 0.54     Doppler Measurements & Calculations  MV E  max raj: 115.0 cm/sec  MV A max raj: 83.9 cm/sec  MV E/A: 1.4  MV dec time: 0.19 sec  Ao V2 max: 190.0 cm/sec  Ao max P.0 mmHg  Ao V2 mean: 131.8 cm/sec  Ao mean P.8 mmHg  Ao V2 VTI: 41.3 cm  ALISHA(I,D): 2.5 cm2  ALISHA(V,D): 2.4 cm2  LV V1 max P.2 mmHg  LV V1 max: 134.0 cm/sec  LV V1 VTI: 30.1 cm  SV(LVOT): 103.8 ml  SI(LVOT): 47.2 ml/m2  PA acc time: 0.11 sec  TR max raj: 289.6 cm/sec  TR max P.5 mmHg  AV Raj Ratio (DI): 0.71  ALISHA Index (cm2/m2): 1.1  E/E' av.0  Lateral E/e': 11.5  Medial E/e': 14.5     ______________________________________________________________________________  Report approved by: Kayla Schulte 2022 04:23 PM         NM Lexiscan stress test (nuc card)     Value    Target     Baseline Systolic     Baseline Diastolic BP 67    Last Stress Systolic     Last Stress Diastolic BP 49    Baseline HR 81    Max HR  90    Max Predicted HR  62    Rate Pressure Product 9,900.0    Stress/rest perfusion ratio 0.96    Narrative       The nuclear stress test is negative for inducible myocardial ischemia   or infarction.     Left ventricular function is normal.     The left ventricular ejection fraction at stress is greater than 70%.     There is no prior study for comparison.           Discharge Medications   Current Discharge Medication List      START taking these medications    Details   benzonatate (TESSALON) 100 MG capsule Take 1 capsule (100 mg) by mouth 3 times daily as needed for cough    Associated Diagnoses: Cellulitis of right lower extremity      calcium carbonate (TUMS) 500 MG chewable tablet Take 1 tablet (500 mg) by mouth daily as needed for heartburn    Associated Diagnoses: Cellulitis of right lower extremity      cyclobenzaprine (FLEXERIL) 10 MG tablet Take 1 tablet (10 mg) by mouth every 8 hours as needed for muscle spasms    Associated Diagnoses: Cellulitis of right lower extremity      ferrous sulfate (FEROSUL) 325 (65 Fe) MG tablet Take 1  tablet (325 mg) by mouth daily    Associated Diagnoses: Ulcer of right lower extremity with fat layer exposed (H); Cellulitis of right lower extremity      gabapentin (NEURONTIN) 400 MG capsule Take 1 capsule (400 mg) by mouth 3 times daily    Associated Diagnoses: Pain associated with wound; Other chronic pain      guaiFENesin (ROBITUSSIN) 20 mg/mL SOLN solution Take 10 mLs by mouth every 4 hours as needed for cough    Associated Diagnoses: Cellulitis of right lower extremity      HYDROmorphone (DILAUDID) 2 MG tablet Take 1 tablet (2 mg) by mouth every 4 hours as needed  Qty: 12 tablet, Refills: 0    Associated Diagnoses: Cellulitis of right lower extremity      Nutritional Supplements (AUSTIN) PACK Take 1 packet by mouth 2 times daily  Qty: 1 packet, Refills: 3    Associated Diagnoses: Pain associated with wound; Other chronic pain         CONTINUE these medications which have CHANGED    Details   furosemide (LASIX) 40 MG tablet Take 1 tablet (40 mg) by mouth daily    Associated Diagnoses: Ulcer of right lower extremity with fat layer exposed (H); Cellulitis of right lower extremity      pantoprazole (PROTONIX) 40 MG EC tablet Take 1 tablet (40 mg) by mouth 2 times daily (before meals)    Associated Diagnoses: Cellulitis of right lower extremity      !! rOPINIRole (REQUIP) 0.5 MG tablet Take 1 tablet (0.5 mg) by mouth 2 times daily    Associated Diagnoses: Restless legs       !! - Potential duplicate medications found. Please discuss with provider.      CONTINUE these medications which have NOT CHANGED    Details   aspirin 81 mg chewable tablet Take 81 mg by mouth every evening       Bioflavonoid Products (CINDY-C) 500-550 MG TABS Take 1 tablet by mouth 2 times daily  Qty: 60 tablet, Refills: 3    Associated Diagnoses: Ulcer of right lower extremity with fat layer exposed (H)      buPROPion (WELLBUTRIN SR) 150 MG 12 hr tablet Take 150 mg by mouth every morning       citalopram (CELEXA) 40 MG tablet Take 40 mg by  "mouth every morning       cyanocobalamin (VITAMIN B-12) 1000 MCG tablet Take 1 tablet (1,000 mcg) by mouth daily  Qty: 60 tablet, Refills: 3    Associated Diagnoses: Ulcer of right lower extremity with fat layer exposed (H)      multivitamin w/minerals (CENTRUM ADULTS) tablet Take 1 tablet by mouth daily       Nutritional Supplements (VARICOSE VEINS FORMULA OR) Take 1 tablet by mouth every morning      potassium chloride ER (KLOR-CON M) 20 MEQ CR tablet Take 20 mEq by mouth every evening (TAKE IN ADDITION TO AM/NOON DOSE FOR TOTAL 30mEq DAILY)      !! rOPINIRole (REQUIP) 1 MG tablet Take 1 mg by mouth At Bedtime (TAKE IN ADDITION TO AM/NOON DOSE FOR TOTAL 2MG DAILY)      simvastatin (ZOCOR) 40 MG tablet [SIMVASTATIN (ZOCOR) 40 MG TABLET] Take 40 mg by mouth bedtime.      Skin Protectants, Misc. (INTERDRY 10\"X36\") SHEE Externally apply 7 Units topically once as needed (change daily in groin rash/fold)  Qty: 7 each, Refills: 3    Associated Diagnoses: Candida rash of groin      spironolactone (ALDACTONE) 25 MG tablet [SPIRONOLACTONE (ALDACTONE) 25 MG TABLET] Take 25 mg by mouth daily.      vitamin B-Complex Take 1 tablet by mouth daily  Qty: 60 tablet, Refills: 3    Associated Diagnoses: Ulcer of right lower extremity with fat layer exposed (H)      vitamin D3 (CHOLECALCIFEROL) 250 mcg (07552 units) capsule Take 1 capsule (250 mcg) by mouth daily  Qty: 60 capsule, Refills: 3    Associated Diagnoses: Ulcer of right lower extremity with fat layer exposed (H)       !! - Potential duplicate medications found. Please discuss with provider.      STOP taking these medications       HYDROcodone-acetaminophen (NORCO) 5-325 MG tablet Comments:   Reason for Stopping:         naproxen sodium (ALEVE) 220 MG tablet Comments:   Reason for Stopping:         pregabalin (LYRICA) 100 MG capsule Comments:   Reason for Stopping:             Allergies   Allergies   Allergen Reactions     Other Environmental Allergy Anaphylaxis     " Bees/Wasps Stings     Adhesive Tape Other (See Comments)     Red,itchy and oozes     Adhesive [Mecrylate] Unknown     Other reaction(s): sores     Percocet [Oxycodone-Acetaminophen] Rash     Vicodin [Hydrocodone-Acetaminophen] Nausea and Vomiting and Hives

## 2022-04-29 NOTE — PROGRESS NOTES
DATE & TIME: 04/28/2022 Night    Cognitive Concerns/ Orientation : Alert/Oriented x 4   BEHAVIOR & AGGRESSION TOOL COLOR: Green  ABNL VS/O2: /578 otherwise VSS, CPAP overnight  MOBILITY: Assist-1 gait belt/walker, pt refused gait belt on evenings  PAIN MANAGMENT: Denies  DIET: Regular, Thin liquids-no straws  BOWEL/BLADDER: Purewick in place, no BM; Bedside commode during day, incontinent at times  ABNL LAB/BG: WNL; COVID negative (4/27)  DRAIN/DEVICES: No IV access; Purewick external catheter  SKIN: R leg chinedu/red covered with gauze dressing-changed on evening; Michelle-area reddened  TESTS/PROCEDURES: n/a  D/C DATE: Pending, to TCU  OTHER IMPORTANT INFO: Contact precautions maintained for MRSA history (knee, April 2021)

## 2022-04-29 NOTE — PROGRESS NOTES
Care Management Discharge Note    Discharge Date: 04/29/2022       Discharge Disposition: [facility]  Discharge Services: LTACH    Discharge DME: NA     Discharge Transportation: [facility] today    Private pay costs discussed: Not applicable    PAS Confirmation Code:  NA  Patient/family educated on Medicare website which has current facility and service quality ratings:  yes    Education Provided on the Discharge Plan:  yes  Persons Notified of Discharge Plans: pt, her Daughter Carolyn, Hospitalist, Dr Bullock, [facility]  Patient/Family in Agreement with the Plan: yes    Handoff Referral Completed: Yes    Additional Information:  Received call from Jus, Clinical Liaison from [facility].  They are able to accept pt today.  Contacted Dr Bullock.  He wants pt to continue the Vashe dressing changes bid until  She discharges from [facility] and then he wants her to see him in clinic at the [facility].  Have conversed with pt and her Daughter Carolyn. They are both in agreement.  Have sent message to Dr Zacarias.  Physician to Physician report to accepting Dr Beebe (cell [phone]).  Nurse to Nurse report to ([phone]).  Pt will be going to the [facility], Room 235.  Transportation has been set-up via wheelchair at 1500.        Anny Chavez, RN   Inpatient Care Management  [phone]

## 2022-04-30 NOTE — PLAN OF CARE
Physical Therapy Discharge Summary    Reason for therapy discharge:    Discharged to long term care facility.    Progress towards therapy goal(s). See goals on Care Plan in [facility] electronic health record for goal details.  Goals partially met.  Barriers to achieving goals:   discharge from facility.    Therapy recommendation(s):    Continued therapy is recommended.  Rationale/Recommendations:  To further progress independence with functional mobility.

## 2022-05-10 ENCOUNTER — TELEPHONE (OUTPATIENT)
Dept: WOUND CARE | Facility: CLINIC | Age: 77
End: 2022-05-10
Payer: COMMERCIAL

## 2022-05-10 NOTE — TELEPHONE ENCOUNTER
Call returned to Carolyn. The patient will be discharging from [facility] this week and she is wanting to determine a plan. Dr. Bullock contacted to discuss. Will await a call back.

## 2022-05-10 NOTE — TELEPHONE ENCOUNTER
Patient's daughter, Carolyn, called [facility] to update that patient would be discharging from current facility on 5/13. Carolyn is asking for next steps for patient care and would also like to reschedule 5/11. Routing to Wound Healing RN for next steps   [phone]

## 2022-05-10 NOTE — TELEPHONE ENCOUNTER
Discussed with Dr. Bullock who states the patient should have a 2 layer wrap done twice weekly with home care if able. The patient should come to the clinic 1-2 times weekly if unable to get home care. He will have Dr. Mckeon see patient when rounding at [facility] tomorrow. Returned call to Carolyn to discuss above. She reports the patient is approved to have home care and will start the day after she leaves [facility]. The home care agency is [facility]. [facility] is looking into if they are able to do 2 layer wraps. Carolyn might be with the patient today during the dressing change and will send photos of the wound if able. Will await the answer from [facility].

## 2022-05-11 NOTE — TELEPHONE ENCOUNTER
Photos received from patient's daughter of the right lower leg:          Photos reviewed with Dr. Bullock. He is ok with patient discharging with home care. To be determined yet what home care is able to do for dressings/treatment.  Patient's daughter Carolyn called to discuss. She reports they are applying Cera-Ve lotion as often as possible. The patient will discharge home Friday and home care will assess on Monday. Home care will call [facility] to discuss wound care and will coordinate based on what they are able to do.

## 2022-05-25 ENCOUNTER — LAB REQUISITION (OUTPATIENT)
Dept: LAB | Facility: CLINIC | Age: 77
End: 2022-05-25
Payer: COMMERCIAL

## 2022-05-25 DIAGNOSIS — D50.9 IRON DEFICIENCY ANEMIA, UNSPECIFIED: ICD-10-CM

## 2022-05-25 DIAGNOSIS — R73.09 OTHER ABNORMAL GLUCOSE: ICD-10-CM

## 2022-05-25 DIAGNOSIS — Z22.322 CARRIER OR SUSPECTED CARRIER OF METHICILLIN RESISTANT STAPHYLOCOCCUS AUREUS: ICD-10-CM

## 2022-05-25 DIAGNOSIS — L97.912 NON-PRESSURE CHRONIC ULCER OF UNSPECIFIED PART OF RIGHT LOWER LEG WITH FAT LAYER EXPOSED (H): ICD-10-CM

## 2022-05-25 LAB
ALBUMIN SERPL-MCNC: 4.1 G/DL (ref 3.5–5)
ALP SERPL-CCNC: 135 U/L (ref 45–120)
ALT SERPL W P-5'-P-CCNC: 20 U/L (ref 0–45)
ANION GAP SERPL CALCULATED.3IONS-SCNC: 15 MMOL/L (ref 5–18)
AST SERPL W P-5'-P-CCNC: 21 U/L (ref 0–40)
BILIRUB SERPL-MCNC: 0.3 MG/DL (ref 0–1)
BUN SERPL-MCNC: 32 MG/DL (ref 8–28)
CALCIUM SERPL-MCNC: 10.3 MG/DL (ref 8.5–10.5)
CHLORIDE BLD-SCNC: 100 MMOL/L (ref 98–107)
CO2 SERPL-SCNC: 28 MMOL/L (ref 22–31)
CREAT SERPL-MCNC: 1.04 MG/DL (ref 0.6–1.1)
GFR SERPL CREATININE-BSD FRML MDRD: 56 ML/MIN/1.73M2
GLUCOSE BLD-MCNC: 88 MG/DL (ref 70–125)
IRON SATN MFR SERPL: 10 % (ref 20–50)
IRON SERPL-MCNC: 41 UG/DL (ref 42–175)
POTASSIUM BLD-SCNC: 4.2 MMOL/L (ref 3.5–5)
PROT SERPL-MCNC: 8.3 G/DL (ref 6–8)
SODIUM SERPL-SCNC: 143 MMOL/L (ref 136–145)
TIBC SERPL-MCNC: 406 UG/DL (ref 313–563)
TRANSFERRIN SERPL-MCNC: 325 MG/DL (ref 212–360)
VIT B12 SERPL-MCNC: 985 PG/ML (ref 213–816)

## 2022-05-25 PROCEDURE — 80053 COMPREHEN METABOLIC PANEL: CPT | Mod: ORL | Performed by: PHYSICIAN ASSISTANT

## 2022-05-25 PROCEDURE — 82607 VITAMIN B-12: CPT | Mod: ORL | Performed by: PHYSICIAN ASSISTANT

## 2022-05-25 PROCEDURE — 84466 ASSAY OF TRANSFERRIN: CPT | Mod: ORL | Performed by: PHYSICIAN ASSISTANT

## 2022-06-01 ENCOUNTER — TELEPHONE (OUTPATIENT)
Dept: WOUND CARE | Facility: CLINIC | Age: 77
End: 2022-06-01
Payer: COMMERCIAL

## 2022-06-01 NOTE — TELEPHONE ENCOUNTER
Patient called regarding the need for some forms to be sent to [facility]. Patient received a letter dated 5/20 from [facility] stating they were unable to make her long term disability payment due to missing a signed form from [facility] that was to be faxed. They are requesting notes from her visits (3/1 to present), diagnotic tests and results, medication list and treatments that fall between March 1 to present. Patient did not have a contact number for [employer] but does have a fax number which is [phone]    Patient can be reached at [phone] if needed.

## 2022-06-01 NOTE — TELEPHONE ENCOUNTER
"The only fax received was consent to communicate with [facility]. Informed patient. Faxed last visit note from 3/10 with a note that states \"Please let us know what else is needed.\"  Patient agreed with plan.  "

## 2022-06-09 ENCOUNTER — HOSPITAL ENCOUNTER (OUTPATIENT)
Dept: WOUND CARE | Facility: CLINIC | Age: 77
Discharge: HOME | End: 2022-06-09
Attending: SURGERY | Admitting: SURGERY
Payer: COMMERCIAL

## 2022-06-09 VITALS — HEART RATE: 71 BPM | SYSTOLIC BLOOD PRESSURE: 153 MMHG | DIASTOLIC BLOOD PRESSURE: 81 MMHG | TEMPERATURE: 96.9 F

## 2022-06-09 DIAGNOSIS — L97.912 ULCER OF RIGHT LOWER EXTREMITY WITH FAT LAYER EXPOSED (H): Primary | ICD-10-CM

## 2022-06-09 PROCEDURE — 97597 DBRDMT OPN WND 1ST 20 CM/<: CPT | Performed by: SURGERY

## 2022-06-09 PROCEDURE — 97602 WOUND(S) CARE NON-SELECTIVE: CPT

## 2022-06-09 PROCEDURE — 99213 OFFICE O/P EST LOW 20 MIN: CPT | Mod: 25 | Performed by: SURGERY

## 2022-06-09 RX ORDER — IPRATROPIUM BROMIDE AND ALBUTEROL SULFATE 2.5; .5 MG/3ML; MG/3ML
3 SOLUTION RESPIRATORY (INHALATION)
COMMUNITY
Start: 2022-05-13 | End: 2022-06-12

## 2022-06-09 RX ORDER — ASPIRIN 81 MG/1
1 TABLET, CHEWABLE ORAL EVERY MORNING
COMMUNITY
Start: 2022-05-13

## 2022-06-09 RX ORDER — MAGNESIUM OXIDE 400 MG/1
1 TABLET ORAL EVERY MORNING
COMMUNITY
Start: 2022-05-13

## 2022-06-09 RX ORDER — BUPROPION HYDROCHLORIDE 75 MG/1
75 TABLET ORAL
COMMUNITY
Start: 2022-05-13 | End: 2022-06-12

## 2022-06-09 RX ORDER — AMOXICILLIN 250 MG
1 CAPSULE ORAL PRN
COMMUNITY
Start: 2022-05-13 | End: 2024-07-16

## 2022-06-09 RX ORDER — ASCORBIC ACID 500 MG
500 TABLET ORAL
COMMUNITY
Start: 2022-05-13 | End: 2022-06-12

## 2022-06-09 RX ORDER — LIDOCAINE 50 MG/G
PATCH TOPICAL
COMMUNITY
Start: 2022-05-27 | End: 2024-07-16

## 2022-06-09 RX ORDER — NITROGLYCERIN 0.4 MG/1
TABLET SUBLINGUAL
COMMUNITY
Start: 2022-05-20

## 2022-06-09 RX ORDER — CALCIUM CARBONATE 500(1250)
500 TABLET,CHEWABLE ORAL
COMMUNITY
Start: 2022-05-13 | End: 2022-06-12

## 2022-06-09 RX ORDER — POLYETHYLENE GLYCOL 3350 17 G/17G
POWDER, FOR SOLUTION ORAL
COMMUNITY
Start: 2022-05-14 | End: 2024-07-16

## 2022-06-09 RX ORDER — GABAPENTIN 100 MG/1
CAPSULE ORAL
COMMUNITY
Start: 2022-05-20 | End: 2024-07-16

## 2022-06-09 RX ORDER — GINSENG 100 MG
1 CAPSULE ORAL EVERY MORNING
COMMUNITY

## 2022-06-09 RX ORDER — CYCLOBENZAPRINE HCL 10 MG
10 TABLET ORAL
COMMUNITY
Start: 2022-05-13 | End: 2022-06-12

## 2022-06-09 NOTE — DISCHARGE INSTRUCTIONS
Elizabeth VERA Warren      1945    [facility] (Intake) Phone: [phone] Fax: [phone]     Medications/supplements to aid in healing:  Vitamin D3 10,000 iu per day  Kaylie C 1,000 mg take daily  Vitamin B Complex with folic acid take 1 tablet daily  Vitamin B 12 1000 mcg daily  Lymphatic Formula 500mg capsule twice daily order from www.[employer] or call [phone].   Add in 1 packet of Aleksandr Supplement into your favorite beverage TWICE a day.    Use lymphedema pump for 1 hour twice a day to the left leg and waist    Wound care recommendations to right lower leg:  Soak right lower leg in warm water Epsom salt bath for 30 minutes. Use 1/8 cup of epsom salt to every 5 gallons of water and adjust amount as needed for comfort.  Wrap leg with VASHE moistened kerlix  Then apply saran wrap over the kerlix  Change twice daily    Apply red stripe edemawear to from right knee to thigh. Apply navy stripe to left toes to knee and red stripe edemawear knee to thigh.     DILLON Bullock M.D. June 9, 2022    Call us at [phone] if you have any questions about your wounds, have redness or swelling around your wound, have a fever of 101 or greater or if you have any other problems or concerns. We answer the phone Monday through Friday 8 am to 4 pm, please leave a message as we check the voicemail frequently throughout the day.     If you had a positive experience please indicate that on your patient satisfaction survey form that [facility] will be sending you.    It was a pleasure meeting with you today.  Thank you for allowing me and my team the privilege of caring for you today.  YOU are the reason we are here, and I truly hope we provided you with the excellent service you deserve.  Please let us know if there is anything else we can do for you so that we can be sure you are leaving completely satisfied with your care experience.      If you have any billing related questions please call  the [facility] Business office at [phone]. The clinic staff does not handle billing related matters.

## 2022-06-09 NOTE — PROGRESS NOTES
[facility]ound He[facility] [place] Progress Note    Subject: Elizabeth Kelley, discharged from [facility], residing at home, she is getting dressing changes 3 times a day, this is an adequate, significant regression in the right lower extremity wound, had been near completely healed at the time of discharge from the hospital to [facility] for rehabilitation.    Patient Active Problem List   Diagnosis     Venous hypertension of lower extremity, bilateral     Acquired lymphedema of leg     Scar condition and fibrosis of skin     Ankle contracture, left     Morbid obesity with BMI of 50.0-59.9, adult (H)     Valgus deformity of both feet     Flat feet, bilateral     Gait abnormality     MS (multiple sclerosis) (H)     Depression     Vitamin D deficiency     GERD (gastroesophageal reflux disease)     Essential hypertension     History of malignant melanoma of skin     Edema     Major depression, single episode, in complete remission (H)     Menopausal and postmenopausal disorder     Mixed hyperlipidemia     Morbid obesity (H)     Peripheral venous insufficiency     Postmenopausal     Restless legs     Cellulitis of right lower extremity     Chronic pain     Ulcer of right lower extremity with fat layer exposed (H)     Pain associated with wound     Past Medical History:   Diagnosis Date     Ankle contracture, left      Class 3 severe obesity due to excess calories without serious comorbidity with body mass index (BMI) of 45.0 to 49.9 in adult (H)      Combined gastric and duodenal ulcer      Controlled substance agreement broken      Depression      Dyslipidemia      Edema      Edema      Gait abnormality      GERD (gastroesophageal reflux disease)      Gout      Lymphedema of both lower extremities      Melanoma (H)     left upper arm     MS (multiple sclerosis) (H)      RLS (restless legs syndrome)      Skin ulcer of left lower leg (H)      Valgus deformity of both feet      Vitamin D deficiency      Exam:  BP (!) 153/81 (BP  Location: Left arm, Patient Position: Sitting)   Pulse 71   Temp 96.9  F (36.1  C) (Temporal)   Wound (used by [facility] only) 03/10/22 0900 Right lower;anterior leg (Active)   Thickness/Stage full thickness 06/09/22 1037   Base granulating;slough 06/09/22 1037   Periwound intact;swelling;redness 06/09/22 1037   Periwound Temperature warm 06/09/22 1037   Periwound Skin Turgor soft 06/09/22 1037   Edges open 06/09/22 1037   Length (cm) 12 06/09/22 1037   Width (cm) 11 06/09/22 1037   Depth (cm) 0.1 06/09/22 1037   Wound (cm^2) 132 cm^2 06/09/22 1037   Wound Volume (cm^3) 13.2 cm^3 06/09/22 1037   Wound healing % 84.29 06/09/22 1037   Drainage Characteristics/Odor serosanguineous 06/09/22 1037   Drainage Amount moderate 06/09/22 1037   Care, Wound non-select wound debridement performed 06/09/22 1037       [facility] 03/10/22 0931 Right lower;medial leg (Active)   Thickness/Stage full thickness 06/09/22 1037   Base granulating;slough 06/09/22 1037   Periwound intact;redness;swelling 06/09/22 1037   Periwound Temperature warm 06/09/22 1037   Periwound Skin Turgor soft 06/09/22 1037   Edges open 06/09/22 1037   Length (cm) 13 06/09/22 1037   Width (cm) 6 06/09/22 1037   Depth (cm) 0.1 06/09/22 1037   Wound (cm^2) 78 cm^2 06/09/22 1037   Wound Volume (cm^3) 7.8 cm^3 06/09/22 1037   Wound healing % -212 06/09/22 1037   Drainage Characteristics/Odor serosanguineous 06/09/22 1037   Drainage Amount moderate 06/09/22 1037   Care, Wound non-select wound debridement performed 06/09/22 1037       Wound (used by [facility] only) 03/10/22 0931 Right lower;lateral leg (Active)   Thickness/Stage full thickness 06/09/22 1037   Base granulating;slough 06/09/22 1037   Periwound intact;redness;swelling 06/09/22 1037   Periwound Temperature warm 06/09/22 1037   Periwound Skin Turgor soft 06/09/22 1037   Edges open 06/09/22 1037   Length (cm) 8 06/09/22 1037   Width (cm) 5 06/09/22 1037   Depth (cm) 0.1 06/09/22 1037   Wound  (cm^2) 40 cm^2 06/09/22 1037   Wound Volume (cm^3) 4 cm^3 06/09/22 1037   Wound healing % -60 06/09/22 1037   Drainage Characteristics/Odor serosanguineous 06/09/22 1037   Drainage Amount moderate 06/09/22 1037   Care, Wound non-select wound debridement performed 06/09/22 1037       Wound (used by [facility] only) 03/10/22 0939 Right posterior;lower leg (Active)   Thickness/Stage full thickness 06/09/22 1037   Base granulating;slough 06/09/22 1037   Periwound intact;redness;swelling 06/09/22 1037   Periwound Temperature warm 06/09/22 1037   Periwound Skin Turgor soft 06/09/22 1037   Edges open 06/09/22 1037   Length (cm) 6 06/09/22 1037   Width (cm) 6 06/09/22 1037   Depth (cm) 0.1 06/09/22 1037   Wound (cm^2) 36 cm^2 06/09/22 1037   Wound Volume (cm^3) 3.6 cm^3 06/09/22 1037   Wound healing % -125 06/09/22 1037   Drainage Characteristics/Odor serosanguineous 06/09/22 1037   Drainage Amount moderate 06/09/22 1037   Care, Wound non-select wound debridement performed 06/09/22 1037     Significant regression of the right lower extremity ulceration, significant biofilm, selective debridement with a 15 blade and pickups of extensive biofilm right lower extremity, no right heel ulceration, utilizing EdemaWear on the left lower extremity, and EdemaWear left thigh red stripe.      Impression: Regression right lower extremity ulceration, right and left lower extremity lymphedema, elevated BMI with recent 40 pound weight loss due to modification of diet    Plan: We will need twice a day dressing changes given significant regression.  Patient believes she can perform this herself, she does have a significant other.  She would perform an Epsom salt soak of the right lower extremity for approximately 15 minutes utilizing approximately 1/8 cup of Epson salt and 5 gallons of water, pat dry, wrap with Kerlix, poor approximately one third of a cup of Vashe hypochlorous acid on the Curlex, wrap with Saran wrap, perform this twice daily,  elevate legs when sitting, utilization of lymphedema pump on the left lower extremity, ideally on the waist also for receptive decompression, can hold on utilizing the right lower extremity until wound is closed.  When wound is closed, then will apply a ceramide-based lotion on the right lower extremity twice daily.  Lifelong utilization of diosmin micronized purified flavonoid fraction, 1 tablet twice daily until wound closed then on a daily basis lifelong, Aleksandr 1 packet twice daily until wound closed, B12, B6, folate, vitamin C, vitamin D     Patient will return to the clinic in 6 weeks time      Further instructions from your care team       Elizabeth Kelley      1945    Medications/supplements to aid in healing:  Vitamin D3 10,000 iu per day  Kaylie C 1,000 mg take twice daily  Vitamin B Complex with folic acid take 1 tablet daily  Vitamin B 12 1000 mcg daily  Vein Formula 500mg capsule twice daily order from www.[employer] or call [phone].     Use lymphedema pump for 1 hour twice a day to the left leg and waist    Wound care recommendations to right anterior lower leg, right lateral lower leg,  right medial lower leg, right posterior lower leg:     DILLON Bullock M.D. June 9, 2022    Call us at [phone] if you have any questions about your wounds, have redness or swelling around your wound, have a fever of 101 or greater or if you have any other problems or concerns. We answer the phone Monday through Friday 8 am to 4 pm, please leave a message as we check the voicemail frequently throughout the day.     If you had a positive experience please indicate that on your patient satisfaction survey form that [facility] will be sending you.    It was a pleasure meeting with you today.  Thank you for allowing me and my team the privilege of caring for you today.  YOU are the reason we are here, and I truly hope we provided you with the excellent service you deserve.  Please let us know if  there is anything else we can do for you so that we can be sure you are leaving completely satisfied with your care experience.      If you have any billing related questions please call the [facility] Business office at [phone]. The clinic staff does not handle billing related matters.           Gabriele Bullock MD on 6/9/2022 at 10:52 AM          Dictated using Dragon voice recognition software which may result in transcription errors

## 2022-06-23 ENCOUNTER — TELEPHONE (OUTPATIENT)
Dept: WOUND CARE | Facility: CLINIC | Age: 77
End: 2022-06-23

## 2022-06-23 NOTE — TELEPHONE ENCOUNTER
Patient called concerned that a lot of skin came off her leg after she soaked wound the other day.  She is also experiencing increased pain.  Wondering if she should be doing anything else.

## 2022-06-23 NOTE — TELEPHONE ENCOUNTER
Left message for Elizabeth to call back.   Future Appointments   Date Time Provider Department Center   7/21/2022 12:50 PM Gabriele Bullock MD [facility]     There is also a tele-visit available 7/5.

## 2022-06-23 NOTE — TELEPHONE ENCOUNTER
Spoke with home care nurse King. She will send photos.  She agrees wound is looking better since debridement and with less drainage.

## 2022-06-23 NOTE — TELEPHONE ENCOUNTER
"3 days ago, patient thought she took off too much \"top stuff\" and leg is really sore since then.  No signs of infection and home care nurse said wound looks good.  Patient also described some \"skin bumps\" at the top and bottom edges of wound, which she wasn't sure was good or bad.    States Epsom salt \"agitates\" leg. She is using 1/8 cup and 5 gallons water (as recommended) but only for 15 minutes, then soaks in warm water 15 minutes. States she has to rinse it, or it's too irritating.    Recommend:  -  Do not soak in plain water. Rinse Epsom salt bath off if needed, then pat dry and do wound care  - She continues to elevate and use lymph pump, and using edemawear on left leg and right thigh  - Scheduled for next available appointment (televisit 7/5)    Left message with home care nurse King at [phone] to see if she can send a photo.  Awaiting call back.    "

## 2022-06-24 ENCOUNTER — LAB REQUISITION (OUTPATIENT)
Dept: LAB | Facility: CLINIC | Age: 77
End: 2022-06-24
Payer: COMMERCIAL

## 2022-06-24 DIAGNOSIS — R60.9 EDEMA, UNSPECIFIED: ICD-10-CM

## 2022-06-24 LAB
ANION GAP SERPL CALCULATED.3IONS-SCNC: 14 MMOL/L (ref 5–18)
BUN SERPL-MCNC: 16 MG/DL (ref 8–28)
CALCIUM SERPL-MCNC: 9.9 MG/DL (ref 8.5–10.5)
CHLORIDE BLD-SCNC: 100 MMOL/L (ref 98–107)
CO2 SERPL-SCNC: 25 MMOL/L (ref 22–31)
CREAT SERPL-MCNC: 0.96 MG/DL (ref 0.6–1.1)
GFR SERPL CREATININE-BSD FRML MDRD: 62 ML/MIN/1.73M2
GLUCOSE BLD-MCNC: 95 MG/DL (ref 70–125)
POTASSIUM BLD-SCNC: 4.4 MMOL/L (ref 3.5–5)
SODIUM SERPL-SCNC: 139 MMOL/L (ref 136–145)

## 2022-06-24 PROCEDURE — 80048 BASIC METABOLIC PNL TOTAL CA: CPT | Mod: ORL | Performed by: PHYSICIAN ASSISTANT

## 2022-06-24 NOTE — TELEPHONE ENCOUNTER
Photos received.  Right anteriolateral leg:    Right medial leg:      Dr. Bullock out of office this week so asked Светлана Agudelo PA-C for recommendations.  Agreed to use Critic-Aid to devon-wound skin and begin Spandage (if tolerated) and Tubigrip for compression.    New orders faxed to home care:  Wound care recommendations to right lower leg:  - Soak right lower leg in warm water Epsom salt bath for 15 minutes. Use 1/8 cup of epsom salt to every 5 gallons of water and adjust amount as needed for comfort. Ok to rinse off, but do not soak in plain water.  - Apply CriticAid barrier paste (or similar barrier cream) to devon-wound skin  - Wrap wounds with VASHE-moistened kerlix  - If tolerated, apply Spandage size 8 (or 9) from just above toes to just below knee   - Apply Spandigrip/Tubigrip size F (or G) from just above toes to just below knee  Change daily

## 2022-07-05 ENCOUNTER — HOSPITAL ENCOUNTER (OUTPATIENT)
Dept: WOUND CARE | Facility: CLINIC | Age: 77
Discharge: HOME | End: 2022-07-05
Attending: SURGERY
Payer: COMMERCIAL

## 2022-07-05 DIAGNOSIS — L97.912 ULCER OF RIGHT LOWER EXTREMITY WITH FAT LAYER EXPOSED (H): Primary | ICD-10-CM

## 2022-07-05 DIAGNOSIS — I89.0 LYMPHEDEMA: ICD-10-CM

## 2022-07-05 PROCEDURE — 99213 OFFICE O/P EST LOW 20 MIN: CPT | Mod: TEL | Performed by: SURGERY

## 2022-07-05 NOTE — DISCHARGE INSTRUCTIONS
Elizabeth VERA Warren      1945    [facility] (Phone: [phone], Fax: [phone])    Add on Certified Lymph Edema Therapy to home care, please.  Faxing referral to the above number.    Please schedule an appointment at [facility] in [place] to be fit for inelastic Velcro compression of both legs and feet (Compreflex wraps).  Their phone number is [phone].  We have faxed the order over to them.    Medications/supplements to aid in healing:  -Vitamin D3 10,000 iu per day  -Kaylie C 1,000 mg take daily  -Vitamin B Complex with folic acid take 1 tablet daily  -Vitamin B 12 1000 mcg daily  -Lymphatic Formula 500mg capsule one daily order from www.[employer] or call [phone]  -One packet of Aleksandr Supplement into your favorite beverage twice a day    -Lymphedema pump for one hour twice a day (starting using on both sides July 5)    Wound care recommendations to Right Lower Leg:  -Soak right lower leg in warm water Epsom salt bath for 30 minutes. Use 1/8 cup of Epsom salt to every 5 gallons of water and adjust amount as needed for comfort.  -Wrap leg with VASHE moistened Kerlix  -Then apply Saran wrap over the Kerlix  -Change twice daily  -Apply Red Stripe EdemaWear to from right knee to thigh  -Apply Navy Stripe EdemaWear to left toes to knee and red stripe edemawear knee to thigh.     DILLON Bullock M.D. July 5, 2022    Call us at [phone] if you have any questions about your wounds, have redness or swelling around your wound, have a fever of 101 or greater or if you have any other problems or concerns. We answer the phone Monday through Friday 8 am to 4 pm, please leave a message as we check the voicemail frequently throughout the day.     If you had a positive experience please indicate that on your patient satisfaction survey form that [facility] will be sending you.    It was a pleasure meeting with you today.  Thank you for allowing me and my  team the privilege of caring for you today.  YOU are the reason we are here, and I truly hope we provided you with the excellent service you deserve.  Please let us know if there is anything else we can do for you so that we can be sure you are leaving completely satisfied with your care experience.      If you have any billing related questions please call the [facility] Business office at [phone]. The clinic staff does not handle billing related matters.

## 2022-07-05 NOTE — PROGRESS NOTES
[facility] Wound Healing [place] Progress Note  Telehealth visit start 7:51 am  End time  8:00 am  Subject: Elizabeth Kelley telehealth visit, chronic bilateral extremity lymphedema with ulcerations in the right lower extremity, she is participating exercises, can ambulate short distances without her walker, she remains otherwise on homebound status.  Has a lymphedema pump that she is using for an hour a day.  Recommended utilizing an hour twice a day until wounds on the right lower extremity are healed.  We will also order a certified lymphedema therapy home evaluation.  She currently does not have inelastic compression, prescription for [employer] to obtain inelastic Velcro compression, calf component foot component for right and left lower extremities, utilize over EdemaWear.  She sleeps in a bed, she is elevating her leg when sitting.    Patient Active Problem List   Diagnosis     Venous hypertension of lower extremity, bilateral     Acquired lymphedema of leg     Scar condition and fibrosis of skin     Ankle contracture, left     Morbid obesity with BMI of 50.0-59.9, adult (H)     Valgus deformity of both feet     Flat feet, bilateral     Gait abnormality     MS (multiple sclerosis) (H)     Depression     Vitamin D deficiency     GERD (gastroesophageal reflux disease)     Essential hypertension     History of malignant melanoma of skin     Edema     Major depression, single episode, in complete remission (H)     Menopausal and postmenopausal disorder     Mixed hyperlipidemia     Morbid obesity (H)     Peripheral venous insufficiency     Postmenopausal     Restless legs     Cellulitis of right lower extremity     Chronic pain     Ulcer of right lower extremity with fat layer exposed (H)     Pain associated with wound     Past Medical History:   Diagnosis Date     Ankle contracture, left      Class 3 severe obesity due to excess calories without serious comorbidity with body mass index (BMI) of 45.0 to 49.9 in adult  (H)      Combined gastric and duodenal ulcer      Controlled substance agreement broken      Depression      Dyslipidemia      Edema      Edema      Gait abnormality      GERD (gastroesophageal reflux disease)      Gout      Lymphedema of both lower extremities      Melanoma (H)     left upper arm     MS (multiple sclerosis) (H)      RLS (restless legs syndrome)      Skin ulcer of left lower leg (H)      Valgus deformity of both feet      Vitamin D deficiency      Exam:  There were no vitals taken for this visit.  Wound (used by [facility] only) 03/10/22 0900 Right lower;anterior leg (Active)   Thickness/Stage full thickness 07/05/22 0741   Base granulating;slough 07/05/22 0741   Periwound intact;swelling;redness 07/05/22 0741   Periwound Temperature warm 07/05/22 0741   Periwound Skin Turgor soft 07/05/22 0741   Edges open 07/05/22 0741   Length (cm) 12 07/05/22 0741   Width (cm) 11 07/05/22 0741   Depth (cm) 0.1 07/05/22 0741   Wound (cm^2) 132 cm^2 07/05/22 0741   Wound Volume (cm^3) 13.2 cm^3 07/05/22 0741   Wound healing % 84.29 07/05/22 0741   Drainage Characteristics/Odor serosanguineous 07/05/22 0741   Drainage Amount moderate 07/05/22 0741   Care, Wound non-select wound debridement performed 07/05/22 0741       Wound (used by [facility] only) 03/10/22 0931 Right lower;medial leg (Active)   Thickness/Stage full thickness 07/05/22 0741   Base granulating;slough 07/05/22 0741   Periwound intact;redness;swelling 07/05/22 0741   Periwound Temperature warm 07/05/22 0741   Periwound Skin Turgor soft 07/05/22 0741   Edges open 07/05/22 0741   Length (cm) 13 07/05/22 0741   Width (cm) 6 07/05/22 0741   Depth (cm) 0.1 07/05/22 0741   Wound (cm^2) 78 cm^2 07/05/22 0741   Wound Volume (cm^3) 7.8 cm^3 07/05/22 0741   Wound healing % -212 07/05/22 0741   Drainage Characteristics/Odor serosanguineous 07/05/22 0741   Drainage Amount moderate 07/05/22 0741   Care, Wound non-select wound debridement performed 07/05/22 0741        Wound (used by [facility] only) 03/10/22 0931 Right lower;lateral leg (Active)   Thickness/Stage full thickness 07/05/22 0741   Base granulating;slough 07/05/22 0741   Periwound intact;redness;swelling 07/05/22 0741   Periwound Temperature warm 07/05/22 0741   Periwound Skin Turgor soft 07/05/22 0741   Edges open 07/05/22 0741   Length (cm) 8 07/05/22 0741   Width (cm) 5 07/05/22 0741   Depth (cm) 0.1 07/05/22 0741   Wound (cm^2) 40 cm^2 07/05/22 0741   Wound Volume (cm^3) 4 cm^3 07/05/22 0741   Wound healing % -60 07/05/22 0741   Drainage Characteristics/Odor serosanguineous 07/05/22 0741   Drainage Amount moderate 07/05/22 0741   Care, Wound non-select wound debridement performed 07/05/22 0741       Wound (used by [facility] only) 03/10/22 0939 Right posterior;lower leg (Active)   Thickness/Stage full thickness 07/05/22 0741   Base granulating;slough 07/05/22 0741   Periwound intact;redness;swelling 07/05/22 0741   Periwound Temperature warm 07/05/22 0741   Periwound Skin Turgor soft 07/05/22 0741   Edges open 07/05/22 0741   Length (cm) 6 07/05/22 0741   Width (cm) 6 07/05/22 0741   Depth (cm) 0.1 07/05/22 0741   Wound (cm^2) 36 cm^2 07/05/22 0741   Wound Volume (cm^3) 3.6 cm^3 07/05/22 0741   Wound healing % -125 07/05/22 0741   Drainage Characteristics/Odor serosanguineous 07/05/22 0741   Drainage Amount moderate 07/05/22 0741   Care, Wound non-select wound debridement performed 07/05/22 0741     Telehealth visit, photographs reviewed.  She denies significant drainage or lymphorrhea from the right lower extremity.  He is utilizing hypochlorous acid Vashe daily.        Impression: Telehealth visit, chronic lymphedema, elevated BMI, right lower extremity ulcerations    Plan: Lymphedema pump 1 hour twice per day, home certified lymphedema therapy evaluation, evaluation at [facility] for inelastic Velcro component for foot and calf, right and left lower extremity, continue utilization of EdemaWear extending to thighs  Patient will return to the clinic in 3 weeks time       Gabriele Bullock MD on 7/5/2022 at 7:52 AM        Dictated using Dragon voice recognition software which may result in transcription errors

## 2022-07-05 NOTE — PROGRESS NOTES
Patient called for a telephone visit. Certified Wound Care Nurse time spent evaluating patient record, completed a full evaluation and documented wound(s) & devon-wound skin; provided recommendation based on treatment plan. Reviewed discharge instructions, patient education, and discussed plan of care with appropriate medical team staff members and patient and/or family members.

## 2022-07-11 NOTE — ADDENDUM NOTE
Encounter addended by: Gabriele Bullock MD on: 7/11/2022 3:31 PM   Actions taken: Clinical Note Signed

## 2022-07-21 ENCOUNTER — HOSPITAL ENCOUNTER (OUTPATIENT)
Dept: WOUND CARE | Facility: CLINIC | Age: 77
Discharge: HOME | End: 2022-07-21
Attending: SURGERY | Admitting: SURGERY
Payer: COMMERCIAL

## 2022-07-21 VITALS — TEMPERATURE: 98.7 F | DIASTOLIC BLOOD PRESSURE: 86 MMHG | HEART RATE: 82 BPM | SYSTOLIC BLOOD PRESSURE: 157 MMHG

## 2022-07-21 DIAGNOSIS — L97.912 ULCER OF RIGHT LOWER EXTREMITY WITH FAT LAYER EXPOSED (H): Primary | ICD-10-CM

## 2022-07-21 PROBLEM — D50.9 IRON DEFICIENCY ANEMIA: Status: ACTIVE | Noted: 2022-07-21

## 2022-07-21 PROBLEM — R06.83 SNORING: Status: ACTIVE | Noted: 2022-07-21

## 2022-07-21 PROBLEM — R73.02 IMPAIRED GLUCOSE TOLERANCE: Status: ACTIVE | Noted: 2022-07-21

## 2022-07-21 PROBLEM — T14.90XA INJURY, UNSPECIFIED, INITIAL ENCOUNTER: Status: ACTIVE | Noted: 2022-04-29

## 2022-07-21 PROCEDURE — 17250 CHEM CAUT OF GRANLTJ TISSUE: CPT | Performed by: SURGERY

## 2022-07-21 PROCEDURE — 99213 OFFICE O/P EST LOW 20 MIN: CPT | Mod: 25 | Performed by: SURGERY

## 2022-07-21 RX ORDER — GABAPENTIN 300 MG/1
CAPSULE ORAL
COMMUNITY
Start: 2022-06-29 | End: 2024-07-16

## 2022-07-21 RX ORDER — POLYHEXAM BIGUAN/GAUZE BANDAGE 0.2%-2"X2"
1 BANDAGE TOPICAL DAILY
COMMUNITY
Start: 2022-06-22 | End: 2024-07-16

## 2022-07-21 RX ORDER — TRIAMCINOLONE ACETONIDE 1 MG/G
CREAM TOPICAL 2 TIMES DAILY
Qty: 453.6 G | Refills: 0 | Status: SHIPPED | OUTPATIENT
Start: 2022-07-21 | End: 2024-07-16

## 2022-07-21 RX ORDER — DOXYCYCLINE 100 MG/1
100 CAPSULE ORAL 2 TIMES DAILY
Qty: 60 CAPSULE | Refills: 0 | Status: SHIPPED | OUTPATIENT
Start: 2022-07-21 | End: 2022-08-20

## 2022-07-21 RX ORDER — ATORVASTATIN CALCIUM 20 MG/1
20 TABLET, FILM COATED ORAL DAILY
COMMUNITY
Start: 2022-06-12 | End: 2024-07-16

## 2022-07-21 NOTE — PROGRESS NOTES
[facility] Wound Healing [place] Progress Note    Subject: Elizabeth Kelley chronic right lower extremity ulcerations, improved, progressive reepithelialization, limited ulcerations now with hypergranulation tissue, elevated BMI though working on diet modification for lowering BMI.    Patient Active Problem List   Diagnosis     Venous hypertension of lower extremity, bilateral     Acquired lymphedema of leg     Scar condition and fibrosis of skin     Ankle contracture, left     Morbid obesity with BMI of 50.0-59.9, adult (H)     Valgus deformity of both feet     Flat feet, bilateral     Gait abnormality     MS (multiple sclerosis) (H)     Depression     Vitamin D deficiency     GERD (gastroesophageal reflux disease)     Essential hypertension     History of malignant melanoma of skin     Edema     Major depression, single episode, in complete remission (H)     Menopausal and postmenopausal disorder     Mixed hyperlipidemia     Morbid obesity (H)     Peripheral venous insufficiency     Postmenopausal     Restless legs     Cellulitis of right lower extremity     Chronic pain     Ulcer of right lower extremity with fat layer exposed (H)     Pain associated with wound     Past Medical History:   Diagnosis Date     Ankle contracture, left      Class 3 severe obesity due to excess calories without serious comorbidity with body mass index (BMI) of 45.0 to 49.9 in adult (H)      Combined gastric and duodenal ulcer      Controlled substance agreement broken      Depression      Dyslipidemia      Edema      Edema      Gait abnormality      GERD (gastroesophageal reflux disease)      Gout      Lymphedema of both lower extremities      Melanoma (H)     left upper arm     MS (multiple sclerosis) (H)      RLS (restless legs syndrome)      Skin ulcer of left lower leg (H)      Valgus deformity of both feet      Vitamin D deficiency      Exam:  BP (!) 157/86 (BP Location: Left arm)   Pulse 82   Temp 98.7  F (37.1  C)   Wound  (used by [facility] only) 03/10/22 0900 Right lower;anterior leg (Active)   Thickness/Stage full thickness 07/21/22 1302   Base hypergranulation 07/21/22 1302   Periwound intact;swelling;redness 07/21/22 1302   Periwound Temperature warm 07/21/22 1302   Periwound Skin Turgor soft 07/21/22 1302   Edges open 07/21/22 1302   Length (cm) 10.5 07/21/22 1302   Width (cm) 5 07/21/22 1302   Depth (cm) 0 07/21/22 1302   Wound (cm^2) 52.5 cm^2 07/21/22 1302   Wound Volume (cm^3) 0 cm^3 07/21/22 1302   Wound healing % 93.75 07/21/22 1302   Drainage Characteristics/Odor serosanguineous 07/21/22 1302   Drainage Amount moderate 07/21/22 1302   Care, Wound non-select wound debridement performed 07/21/22 1302       Wound (used by [facility] only) 03/10/22 0931 Right lower;medial leg (Active)   Thickness/Stage full thickness 07/21/22 1302   Base hypergranulation 07/21/22 1302   Periwound intact;redness;swelling 07/21/22 1302   Periwound Temperature warm 07/21/22 1302   Periwound Skin Turgor soft 07/21/22 1302   Edges open 07/21/22 1302   Length (cm) 13 07/21/22 1302   Width (cm) 2 07/21/22 1302   Depth (cm) 0 07/21/22 1302   Wound (cm^2) 26 cm^2 07/21/22 1302   Wound Volume (cm^3) 0 cm^3 07/21/22 1302   Wound healing % -4 07/21/22 1302   Drainage Characteristics/Odor serosanguineous 07/21/22 1302   Drainage Amount moderate 07/21/22 1302   Care, Wound non-select wound debridement performed 07/21/22 1302       Wound (used by [facility] only) 03/10/22 0931 Right lower;lateral leg (Active)   Thickness/Stage full thickness 07/21/22 1302   Base hypergranulation 07/21/22 1302   Periwound intact;redness;swelling 07/21/22 1302   Periwound Temperature warm 07/21/22 1302   Periwound Skin Turgor soft 07/21/22 1302   Edges open 07/21/22 1302   Length (cm) 3 07/21/22 1302   Width (cm) 3 07/21/22 1302   Depth (cm) 0.1 07/21/22 1302   Wound (cm^2) 9 cm^2 07/21/22 1302   Wound Volume (cm^3) 0.9 cm^3 07/21/22 1302   Wound healing % 64 07/21/22 1302   Drainage  Characteristics/Odor serosanguineous 07/21/22 1302   Drainage Amount moderate 07/21/22 1302   Care, Wound non-select wound debridement performed 07/21/22 1302       Wound (used by [facility] only) 03/10/22 0939 Right posterior;lower leg (Active)   Thickness/Stage full thickness 07/21/22 1302   Base hypergranulation 07/21/22 1302   Periwound intact;redness;swelling 07/21/22 1302   Periwound Temperature warm 07/21/22 1302   Periwound Skin Turgor soft 07/21/22 1302   Edges open 07/21/22 1302   Length (cm) 3 07/21/22 1302   Width (cm) 3 07/21/22 1302   Depth (cm) 0.1 07/21/22 1302   Wound (cm^2) 9 cm^2 07/21/22 1302   Wound Volume (cm^3) 0.9 cm^3 07/21/22 1302   Wound healing % 43.75 07/21/22 1302   Drainage Characteristics/Odor serosanguineous 07/21/22 1302   Drainage Amount moderate 07/21/22 1302   Care, Wound non-select wound debridement performed 07/21/22 1302     Ulcerations right lower extremity with periwound inflammation, no heel ulceration, utilizing EdemaWear on the right thigh, undergoing reduction certified lymphedema therapy left lower extremity.  Hypergranulation tissue right lower extremity treated with silver nitrate.        Impression: Chronic right lower extremity ulcerations, lymphedema, elevated BMI    Plan: Stop Epsom salt soaks as this is causing discomfort for approximately 3 hours after the Epson salt soak.  We will dress the wounds with antimicrobial Endoform under Hydrofera Blue classic given hypergranulation tissue, EdemaWear on the right thigh though not utilizing the right leg due to sensitivity.  Vashe soak of wounds at each dressing change, can do dressing changes every other day.  Triamcinolone cream to periwound margins and on inflammatory tissue.  Doxycycline 100 mg twice daily for 1 month.  elevate leg when sitting, ambulate ad cheryl diosmin MP FF and adjunct of micronutrients..  Patient will return to the clinic in 6 weeks time        Further instructions from your care team       Elizabeth VERA  Warren      1945    [facility] at [facility] (Phone: [phone], Fax: [phone])    Medications/supplements to aid in healing:  -Vitamin D3 10,000 iu per day  -Kaylie C 1,000 mg take daily  -Vitamin B Complex with folic acid take 1 tablet daily  -Vitamin B 12 1000 mcg daily  -Lymphatic Formula 1000mg. Take 500mg capsule twice daily order from www.[employer] or call [phone]. (The 1000mg is two (2) 500mg capsules)    -One packet of Aleksandr Supplement into your favorite beverage twice a day  -Lymphedema pump for one hour twice a day     Wound care recommendations to Right Lower Leg:  -After cleansing with mild unscented soap and water, apply small amount of VASHE on gauze, lay into wound bed, let sit for 15-30 minutes, remove gauze (do not rinse) then apply dressing:  -Apply triamcinolone cream to intact skin on leg  -Apply endoform antimicrobial to wound base then apply hydrofera blue classic. Lightly moisten hydrofera blue with saline or tap water.  -Then apply 4x4 gauze or ABD and secure with kerlix and tape.  -Change every other day    -Apply Red Stripe EdemaWear to from right knee to thigh  -Apply Navy Stripe EdemaWear to left toes to knee and red stripe edemawear knee to  thigh.    Compression:   Please remove compression dressing if toes turn blue and/or tingle and can not be relieved by raising the leg for one hour. If unable to reapply in the morning, keep compression on until next dressing change.  Walk as much as you can, as you are able. When you sit raise your ankle above your hips to promote wound healing.      DILLON Bullock M.D. July 21, 2022    Call us at [phone] if you have any questions about your wounds, have redness or swelling around your wound, have a fever of 101 or greater or if you have any other problems or concerns. We answer the phone Monday through Friday 8 am to 4 pm, please leave a message as we check the voicemail frequently throughout the day.     If you  had a positive experience please indicate that on your patient satisfaction survey form that [facility] will be sending you.    It was a pleasure meeting with you today.  Thank you for allowing me and my team the privilege of caring for you today.  YOU are the reason we are here, and I truly hope we provided you with the excellent service you deserve.  Please let us know if there is anything else we can do for you so that we can be sure you are leaving completely satisfied with your care experience.      If you have any billing related questions please call the [facility] Business office at [phone]. The clinic staff does not handle billing related matters.    If you are scheduled have a follow up appointment, you will receive a reminder call the day before your visit. If you are unable to keep that appointment, please call the clinic to cancel or reschedule.          Gabriele Bullock MD on 7/21/2022 at 1:26 PM      Dictated using Dragon voice recognition software which may result in transcription errors

## 2022-07-21 NOTE — DISCHARGE INSTRUCTIONS
Elizabeth VERA Zeinaamanda      1945    [facility] at [facility] (Phone: [phone], Fax: [phone])    Medications/supplements to aid in healing:  -Vitamin D3 10,000 iu per day  -Kaylie C 1,000 mg take daily  -Vitamin B Complex with folic acid take 1 tablet daily  -Vitamin B 12 1000 mcg daily  -Lymphatic Formula 1000mg. Take 500mg capsule twice daily order from www.[employer] or call [phone]. (The 1000mg is two (2) 500mg capsules)    -One packet of Aleksandr Supplement into your favorite beverage twice a day  -Lymphedema pump for one hour twice a day     Wound care recommendations to Right Lower Leg:  -After cleansing with mild unscented soap and water, apply small amount of VASHE on gauze, lay into wound bed, let sit for 15-30 minutes, remove gauze (do not rinse) then apply dressing:  -Apply triamcinolone cream to intact skin on leg  -Apply endoform antimicrobial to wound base then apply hydrofera blue classic. Lightly moisten hydrofera blue with saline or tap water.  -Then apply 4x4 gauze or ABD and secure with kerlix and tape.  -Change every other day    -Apply Red Stripe EdemaWear to from right knee to thigh  -Apply Navy Stripe EdemaWear to left toes to knee and red stripe edemawear knee to  thigh.    Compression:   Please remove compression dressing if toes turn blue and/or tingle and can not be relieved by raising the leg for one hour. If unable to reapply in the morning, keep compression on until next dressing change.  Walk as much as you can, as you are able. When you sit raise your ankle above your hips to promote wound healing.      DILLON Bullock M.D. July 21, 2022    Call us at [phone] if you have any questions about your wounds, have redness or swelling around your wound, have a fever of 101 or greater or if you have any other problems or concerns. We answer the phone Monday through Friday 8 am to 4 pm, please leave a message as we check the voicemail frequently throughout the day.      If you had a positive experience please indicate that on your patient satisfaction survey form that [facility] will be sending you.    It was a pleasure meeting with you today.  Thank you for allowing me and my team the privilege of caring for you today.  YOU are the reason we are here, and I truly hope we provided you with the excellent service you deserve.  Please let us know if there is anything else we can do for you so that we can be sure you are leaving completely satisfied with your care experience.      If you have any billing related questions please call the [facility] Business office at [phone]. The clinic staff does not handle billing related matters.    If you are scheduled have a follow up appointment, you will receive a reminder call the day before your visit. If you are unable to keep that appointment, please call the clinic to cancel or reschedule.

## 2022-07-23 ENCOUNTER — HEALTH MAINTENANCE LETTER (OUTPATIENT)
Age: 77
End: 2022-07-23

## 2022-09-07 ENCOUNTER — MEDICAL CORRESPONDENCE (OUTPATIENT)
Dept: HEALTH INFORMATION MANAGEMENT | Facility: CLINIC | Age: 77
End: 2022-09-07

## 2022-09-07 ENCOUNTER — HOSPITAL ENCOUNTER (OUTPATIENT)
Dept: WOUND CARE | Facility: CLINIC | Age: 77
Discharge: HOME | End: 2022-09-07
Attending: SURGERY | Admitting: SURGERY
Payer: COMMERCIAL

## 2022-09-07 VITALS — DIASTOLIC BLOOD PRESSURE: 79 MMHG | SYSTOLIC BLOOD PRESSURE: 151 MMHG | TEMPERATURE: 98.4 F | HEART RATE: 71 BPM

## 2022-09-07 DIAGNOSIS — L97.912 ULCER OF RIGHT LOWER EXTREMITY WITH FAT LAYER EXPOSED (H): Primary | ICD-10-CM

## 2022-09-07 PROCEDURE — 97602 WOUND(S) CARE NON-SELECTIVE: CPT

## 2022-09-07 PROCEDURE — 99213 OFFICE O/P EST LOW 20 MIN: CPT | Performed by: SURGERY

## 2022-09-07 RX ORDER — TRIAMCINOLONE ACETONIDE 1 MG/G
OINTMENT TOPICAL
Qty: 30 G | Refills: 1 | Status: SHIPPED | OUTPATIENT
Start: 2022-09-07 | End: 2024-07-16

## 2022-09-07 NOTE — PROGRESS NOTES
[facility] [place] Progress Note    Subject: Elizabeth Kelley closed wounds right lower extremity, improved lymphedema, has been having treatment with lymphedema therapist, patient cannot perform self wraps.  Triamcinolone cream application to skin has decreased histamine which is also decreased interstitial edema and lymphatic function improvement.  She will follow-up with Ashley to ultimately be fitted for a garment for maintenance since she has near completely accomplished reduction.  She does have a lymphedema pump that she uses on a regular basis.  She can now begin to shower on a daily basis.  Recommended lifelong utilization of M PFF to improve venous and lymphatic function, vitamin D at 5000 international units daily, vitamin C, B12, B6, folate.  She is increasing her step total to approximately 2-3000 steps daily.  She continues to modify BMI.  No added salt to food.    Patient Active Problem List   Diagnosis     Venous hypertension of lower extremity, bilateral     Acquired lymphedema of leg     Scar condition and fibrosis of skin     Ankle contracture, left     Morbid obesity with BMI of 50.0-59.9, adult (H)     Valgus deformity of both feet     Flat feet, bilateral     Gait abnormality     MS (multiple sclerosis) (H)     Depression     Vitamin D deficiency     GERD (gastroesophageal reflux disease)     Essential hypertension     History of malignant melanoma of skin     Edema     Major depression, single episode, in complete remission (H)     Menopausal and postmenopausal disorder     Mixed hyperlipidemia     Morbid obesity (H)     Peripheral venous insufficiency     Postmenopausal     Restless legs     Cellulitis of right lower extremity     Chronic pain     Ulcer of right lower extremity with fat layer exposed (H)     Pain associated with wound     Impaired glucose tolerance     Injury, unspecified, initial encounter     Iron deficiency anemia     Snoring     Past Medical History:    Diagnosis Date     Ankle contracture, left      Class 3 severe obesity due to excess calories without serious comorbidity with body mass index (BMI) of 45.0 to 49.9 in adult (H)      Combined gastric and duodenal ulcer      Controlled substance agreement broken      Depression      Dyslipidemia      Edema      Edema      Gait abnormality      GERD (gastroesophageal reflux disease)      Gout      Lymphedema of both lower extremities      Melanoma (H)     left upper arm     MS (multiple sclerosis) (H)      RLS (restless legs syndrome)      Skin ulcer of left lower leg (H)      Valgus deformity of both feet      Vitamin D deficiency      Exam:  BP (!) 151/79 (BP Location: Left arm, Patient Position: Chair)   Pulse 71   Temp 98.4  F (36.9  C) (Temporal)   Wound Leg Other (comment);Ulceration (Active)       Wound (used by [facility] only) 03/10/22 0900 Right lower;anterior leg (Active)   Thickness/Stage full thickness 09/07/22 1513   Base hypergranulation 09/07/22 1513   Periwound intact;swelling;redness 09/07/22 1513   Periwound Temperature warm 09/07/22 1513   Periwound Skin Turgor soft 09/07/22 1513   Edges open 09/07/22 1513   Length (cm) 10.5 07/21/22 1302   Width (cm) 5 07/21/22 1302   Depth (cm) 0 07/21/22 1302   Wound (cm^2) 52.5 cm^2 07/21/22 1302   Wound Volume (cm^3) 0 cm^3 07/21/22 1302   Wound healing % 93.75 07/21/22 1302   Drainage Characteristics/Odor serosanguineous 09/07/22 1513   Drainage Amount copious 09/07/22 1513   Care, Wound chemical cautery applied 07/21/22 1302       Wound (used by [facility] only) 03/10/22 0931 Right lower;medial leg (Active)   Thickness/Stage full thickness 09/07/22 1513   Base hypergranulation 09/07/22 1513   Periwound intact;redness;swelling 09/07/22 1513   Periwound Temperature warm 09/07/22 1513   Periwound Skin Turgor soft 09/07/22 1513   Edges open 09/07/22 1513   Length (cm) 13 07/21/22 1302   Width (cm) 2 07/21/22 1302   Depth (cm) 0 07/21/22 1302   Wound (cm^2) 26 cm^2  07/21/22 1302   Wound Volume (cm^3) 0 cm^3 07/21/22 1302   Wound healing % -4 07/21/22 1302   Drainage Characteristics/Odor serosanguineous 09/07/22 1513   Drainage Amount copious 09/07/22 1513   Care, Wound chemical cautery applied 07/21/22 1302       Wound (used by [facility] only) 03/10/22 0931 Right lower;lateral leg (Active)   Thickness/Stage full thickness 09/07/22 1513   Base hypergranulation 09/07/22 1513   Periwound intact;redness;swelling 09/07/22 1513   Periwound Temperature warm 09/07/22 1513   Periwound Skin Turgor soft 09/07/22 1513   Edges open 09/07/22 1513   Length (cm) 3 07/21/22 1302   Width (cm) 3 07/21/22 1302   Depth (cm) 0.1 07/21/22 1302   Wound (cm^2) 9 cm^2 07/21/22 1302   Wound Volume (cm^3) 0.9 cm^3 07/21/22 1302   Wound healing % 64 07/21/22 1302   Drainage Characteristics/Odor serosanguineous 09/07/22 1513   Drainage Amount copious 09/07/22 1513   Care, Wound chemical cautery applied 07/21/22 1302       Wound (used by [facility] only) 03/10/22 0939 Right posterior;lower leg (Active)   Thickness/Stage partial thickness 09/07/22 1513   Base granulating 09/07/22 1513   Periwound intact;redness;swelling 09/07/22 1513   Periwound Temperature warm 09/07/22 1513   Periwound Skin Turgor soft 09/07/22 1513   Edges open 09/07/22 1513   Length (cm) 1.3 09/07/22 1513   Width (cm) 1.3 09/07/22 1513   Depth (cm) 0.1 09/07/22 1513   Wound (cm^2) 1.69 cm^2 09/07/22 1513   Wound Volume (cm^3) 0.17 cm^3 09/07/22 1513   Wound healing % 89.44 09/07/22 1513   Drainage Characteristics/Odor serous 09/07/22 1513   Drainage Amount copious;small 09/07/22 1513   Care, Wound chemical cautery applied 07/21/22 1302       VASC Wound right lower leg (Active)       Incision/Surgical Site 03/21/22 Right;Lower Leg (Active)     See photodocumentation, no cellulitis right lower extremity, small area of desquamation proximal right posterior calf, persistent elevated dermal histamine posterior calf.  No heel  ulceration.        Impression: Chronic lymphedema, near complete resolution wounds right lower extremity    Plan: We will dress the wounds with triamcinolone cream to posterior calf, EdemaWear utilization with Velcro inelastic compression or edema wraps, will be fit at [facility] for chronic long-term compression utilization.  Lifelong utilization of diosmin M PFF.  Patient will return to the clinic in 6 weeks time with telehealth visit.      Further instructions from your care team       Elizabeth Kelley      1945    [facility] [place] (Phone: [phone], Fax: [phone])    -Contact [facility] for life long compression needs.  -OK to shower, coinciding with dressing reapplication, or get a cast protector from [facility], [facility], etc to keep the dressing dry til next change.    Medications/supplements to aid in healing, continue with:  -Vitamin D3  5,000 iu per day  -Kaylie C  1,000 mg take daily  -Vitamin B Complex with folic acid take 1 tablet daily  -Vitamin B 12 1000 mcg daily  -Lymphatic Formula 1000mg. Take 500mg capsule twice daily. (The 1000mg is two (2) 500mg capsules)  (order from www.[employer] or call 019-911-4968.)   -One packet of Aleksandr Supplement into your favorite beverage twice a day    -Lymphedema pump for one hour twice a day    Wound care recommendations to Right Lower Leg wounds: anterior, medial, posterior and lateral:  -After cleansing with mild unscented soap and water, apply small amount of VASHE on gauze, lay into wound bed, let sit for 15-30 minutes, remove gauze (do not rinse), then apply dressing:  -apply Cera-Ve lotion to intact skin on leg  -Apply triamcinolone cream to intact skin on leg, and triamcinolone cream to posterior leg area that is draining  -Apply Endoform Antimicrobial to wound base then apply Hydrofera Blue classic. (Lightly moisten hydrofera blue classic with saline or tap water first before applying)  -Then apply 4x4 gauze or ABD and secure with kerlix  and tape.  -Change every other day, coordinate with the Lymphedema wrap applications  -lymphedema wraps in place of Edema Wear is acceptable  -Apply Red Stripe EdemaWear to from right knee to thigh.  -Apply Navy Stripe EdemaWear to left toes to knee and red stripe edemawear knee to thigh.    Compression:  Please remove compression dressing if toes turn blue and/or tingle and can not be  relieved by raising the leg for one hour. If unable to reapply in the morning, keep  compression on until next dressing change.  Walk as much as you can, as you are able. When you sit raise your ankle above  your hips to promote wound healing.     DILLON Bullock M.D. September 7, 2022    Call us at [phone] if you have any questions about your wounds, have redness or swelling around your wound, have a fever of 101 or greater or if you have any other problems or concerns. We answer the phone Monday through Friday 8 am to 4 pm, please leave a message as we check the voicemail frequently throughout the day.     If you had a positive experience please indicate that on your patient satisfaction survey form that [facility] will be sending you.    It was a pleasure meeting with you today.  Thank you for allowing me and my team the privilege of caring for you today.  YOU are the reason we are here, and I truly hope we provided you with the excellent service you deserve.  Please let us know if there is anything else we can do for you so that we can be sure you are leaving completely satisfied with your care experience.      If you have any billing related questions please call the [facility] Business office at [phone]. The clinic staff does not handle billing related matters.    If you are scheduled to have a follow up appointment, you will receive a reminder call the day before your visit. On the appointment day please arrive 15 minutes prior to your appointment time. If you are unable to keep that appointment, please call the  clinic to cancel or reschedule. If you are more than 10 minutes late or greater for your appointment, the clinic policy is that you may be asked to reschedule.           Gabriele Bullock MD on 9/7/2022 at 3:22 PM      Dictated using Dragon voice recognition software which may result in transcription errors

## 2022-09-07 NOTE — DISCHARGE INSTRUCTIONS
Elizabeth VERA Warren      1945    [facility] (Phone: [phone], Fax: [phone])    -Please text a photo of your leg the day before or the day of your appointment to [phone]. Please include your name and birthdate.    -Contact [facility] for life long compression needs.    -OK to shower, coinciding with dressing reapplication, or get a cast protector from [facility], [facility], etc to keep the dressing dry til next change.    Medications/supplements to aid in healing, continue with:  -Vitamin D3  5,000 iu per day  -Kaylie C  1,000 mg take daily  -Vitamin B Complex with folic acid take 1 tablet daily  -Vitamin B 12 1000 mcg daily  -Lymphatic Formula 1000mg. Take 500mg capsule twice daily. (The 1000mg is two (2) 500mg capsules)  (order from www.[employer] or call 498-769-2259.)   -One packet of Aleksandr Supplement into your favorite beverage twice a day    -Lymphedema pump for one hour twice a day    Wound care recommendations to Right Lower Leg wounds: anterior, medial, posterior and lateral:  -After cleansing with mild unscented soap and water, apply small amount of VASHE on gauze, lay into wound bed, let sit for 15-30 minutes, remove gauze (do not rinse), then apply dressing:  -apply Cera-Ve lotion to intact skin on leg  -Apply triamcinolone cream to intact skin on leg, and triamcinolone cream to posterior leg area that is draining  -Apply Endoform Antimicrobial to wound base then apply Hydrofera Blue classic. (Lightly moisten hydrofera blue classic with saline or tap water first before applying)  -Then apply 4x4 gauze or ABD and secure with kerlix and tape.  -Change every other day, coordinate with the Lymphedema wrap applications  -lymphedema wraps in place of Edema Wear is acceptable  -Apply Red Stripe EdemaWear to from right knee to thigh.  -Apply Navy Stripe EdemaWear to left toes to knee and red stripe edemawear knee to thigh.    Compression:  Please remove compression dressing if toes  turn blue and/or tingle and can not be  relieved by raising the leg for one hour. If unable to reapply in the morning, keep  compression on until next dressing change.  Walk as much as you can, as you are able. When you sit raise your ankle above  your hips to promote wound healing.     IDLLON Bullock M.D. September 7, 2022    Call us at [phone] if you have any questions about your wounds, have redness or swelling around your wound, have a fever of 101 or greater or if you have any other problems or concerns. We answer the phone Monday through Friday 8 am to 4 pm, please leave a message as we check the voicemail frequently throughout the day.     If you had a positive experience please indicate that on your patient satisfaction survey form that [facility] will be sending you.    It was a pleasure meeting with you today.  Thank you for allowing me and my team the privilege of caring for you today.  YOU are the reason we are here, and I truly hope we provided you with the excellent service you deserve.  Please let us know if there is anything else we can do for you so that we can be sure you are leaving completely satisfied with your care experience.      If you have any billing related questions please call the [facility] Business office at [phone]. The clinic staff does not handle billing related matters.    If you are scheduled to have a follow up appointment, you will receive a reminder call the day before your visit. On the appointment day please arrive 15 minutes prior to your appointment time. If you are unable to keep that appointment, please call the clinic to cancel or reschedule. If you are more than 10 minutes late or greater for your appointment, the clinic policy is that you may be asked to reschedule.

## 2022-10-01 ENCOUNTER — HEALTH MAINTENANCE LETTER (OUTPATIENT)
Age: 77
End: 2022-10-01

## 2022-10-19 ENCOUNTER — HOSPITAL ENCOUNTER (OUTPATIENT)
Dept: WOUND CARE | Facility: CLINIC | Age: 77
Discharge: HOME | End: 2022-10-19
Attending: SURGERY
Payer: COMMERCIAL

## 2022-10-19 DIAGNOSIS — I89.0 LYMPHEDEMA OF BOTH LOWER EXTREMITIES: ICD-10-CM

## 2022-10-19 DIAGNOSIS — L97.912 ULCER OF RIGHT LOWER EXTREMITY WITH FAT LAYER EXPOSED (H): Primary | ICD-10-CM

## 2022-10-19 PROCEDURE — 99212 OFFICE O/P EST SF 10 MIN: CPT | Mod: 95 | Performed by: SURGERY

## 2022-10-19 RX ORDER — CLOBETASOL PROPIONATE 0.5 MG/G
OINTMENT TOPICAL DAILY
Status: DISCONTINUED | OUTPATIENT
Start: 2022-10-19 | End: 2022-10-20 | Stop reason: HOSPADM

## 2022-10-19 NOTE — PROGRESS NOTES
[facility] [place] Progress Note  Telehealth visit, from wound healing [place], [facility]  Start time 7:43 AM  endtime  7:55  Subject: Elizabeth Kelley telehealth visit, photographs reviewed, no open wounds, no lymphorrhea, there is a distinct sharp line of erythema on the distal right thigh which may be dermatitis.  He is not utilizing EdemaWear on the right leg.  She is utilizing edema wear on the left leg, recommended utilization of EdemaWear on the right leg also to the high thigh and application of clobetasol daily to the areas of erythema and discontinue the layer that is directly in contact of the skin.    Patient Active Problem List   Diagnosis     Venous hypertension of lower extremity, bilateral     Acquired lymphedema of leg     Scar condition and fibrosis of skin     Ankle contracture, left     Morbid obesity with BMI of 50.0-59.9, adult (H)     Valgus deformity of both feet     Flat feet, bilateral     Gait abnormality     MS (multiple sclerosis) (H)     Depression     Vitamin D deficiency     GERD (gastroesophageal reflux disease)     Essential hypertension     History of malignant melanoma of skin     Edema     Major depression, single episode, in complete remission (H)     Menopausal and postmenopausal disorder     Mixed hyperlipidemia     Morbid obesity (H)     Peripheral venous insufficiency     Postmenopausal     Restless legs     Cellulitis of right lower extremity     Chronic pain     Ulcer of right lower extremity with fat layer exposed (H)     Pain associated with wound     Impaired glucose tolerance     Injury, unspecified, initial encounter     Iron deficiency anemia     Snoring     Past Medical History:   Diagnosis Date     Ankle contracture, left      Class 3 severe obesity due to excess calories without serious comorbidity with body mass index (BMI) of 45.0 to 49.9 in adult (H)      Combined gastric and duodenal ulcer      Controlled substance agreement broken       Depression      Dyslipidemia      Edema      Edema      Gait abnormality      GERD (gastroesophageal reflux disease)      Gout      Lymphedema of both lower extremities      Melanoma (H)     left upper arm     MS (multiple sclerosis) (H)      RLS (restless legs syndrome)      Skin ulcer of left lower leg (H)      Valgus deformity of both feet      Vitamin D deficiency      Exam:  There were no vitals taken for this visit.  Wound Leg Other (comment);Ulceration (Active)       Wound (used by [facility] only) 03/10/22 0900 Right lower;anterior leg (Active)   Thickness/Stage partial thickness 10/19/22 0741   Base red;granulating;slough 10/19/22 0741   Periwound intact;swelling;redness 10/19/22 0741   Periwound Temperature warm 10/19/22 0741   Periwound Skin Turgor soft 10/19/22 0741   Edges open 10/19/22 0741   Length (cm) 5.8 09/07/22 1513   Width (cm) 4 09/07/22 1513   Depth (cm) 0.2 09/07/22 1513   Wound (cm^2) 23.2 cm^2 09/07/22 1513   Wound Volume (cm^3) 4.64 cm^3 09/07/22 1513   Wound healing % 97.24 09/07/22 1513   Drainage Characteristics/Odor serosanguineous;serous 10/19/22 0741   Drainage Amount moderate 10/19/22 0741   Care, Wound non-select wound debridement performed 09/07/22 1513       Wound (used by [facility] only) 03/10/22 0931 Right lower;medial leg (Active)   Thickness/Stage partial thickness 09/07/22 1513   Base red;slough;granulating 09/07/22 1513   Periwound intact;redness;swelling 09/07/22 1513   Periwound Temperature warm 09/07/22 1513   Periwound Skin Turgor soft 09/07/22 1513   Edges open 09/07/22 1513   Length (cm) 5.5 09/07/22 1513   Width (cm) 5.3 09/07/22 1513   Depth (cm) 0.1 09/07/22 1513   Wound (cm^2) 29.15 cm^2 09/07/22 1513   Wound Volume (cm^3) 2.92 cm^3 09/07/22 1513   Wound healing % -16.6 09/07/22 1513   Drainage Characteristics/Odor serosanguineous;serous 09/07/22 1513   Drainage Amount moderate 09/07/22 1513   Care, Wound non-select wound debridement performed 09/07/22 1513       Wound  (used by [facility] only) 03/10/22 0931 Right lower;lateral leg (Active)   Thickness/Stage partial thickness 09/07/22 1513   Base granulating 09/07/22 1513   Periwound intact;redness;swelling 09/07/22 1513   Periwound Temperature warm 09/07/22 1513   Periwound Skin Turgor soft 09/07/22 1513   Edges open 09/07/22 1513   Length (cm) 3 09/07/22 1513   Width (cm) 3 09/07/22 1513   Depth (cm) 0.1 09/07/22 1513   Wound (cm^2) 9 cm^2 09/07/22 1513   Wound Volume (cm^3) 0.9 cm^3 09/07/22 1513   Wound healing % 64 09/07/22 1513   Drainage Characteristics/Odor serosanguineous;serous 09/07/22 1513   Drainage Amount moderate 09/07/22 1513   Care, Wound non-select wound debridement performed 09/07/22 1513       Wound (used by [facility] only) 03/10/22 0939 Right posterior;lower leg (Active)   Thickness/Stage partial thickness 09/07/22 1513   Base granulating 09/07/22 1513   Periwound intact;redness;swelling 09/07/22 1513   Periwound Temperature warm 09/07/22 1513   Periwound Skin Turgor soft 09/07/22 1513   Edges open 09/07/22 1513   Length (cm) 1.3 09/07/22 1513   Width (cm) 1.3 09/07/22 1513   Depth (cm) 0.1 09/07/22 1513   Wound (cm^2) 1.69 cm^2 09/07/22 1513   Wound Volume (cm^3) 0.17 cm^3 09/07/22 1513   Wound healing % 89.44 09/07/22 1513   Drainage Characteristics/Odor serous 09/07/22 1513   Drainage Amount copious;small 09/07/22 1513   Care, Wound non-select wound debridement performed 09/07/22 1513       VASC Wound right lower leg (Active)       Incision/Surgical Site 03/21/22 Right;Lower Leg (Active)     Photograph reviewed telehealth        Impression: Dermatitis right thigh, chronic lymphedema, elevated BMI, resolved ulcerations    Plan: Clobetasol application area of dermatitis right thigh on a daily basis, EdemaWear right thigh and right calf similar to left leg, certified lymphedema.  Therapy consult, utilizing lymphedema pump daily.  Lifelong utilization of micronized purified flavonoid fraction, and feta formula.   Follow-up with telehealth call in approximately 6 weeks to ensure the dermatitis is resolved, may require dermatology consultation.      Further instructions from your care team       Elizabeth Kelley      1945    A DME order was not completed because supplies were not needed    -Please text a photo of your leg the day before or the day of your appointment to [phone]. Please include your name and birthdate.  -Contact [facility] for life long compression needs.  -OK to shower, coinciding with dressing reapplication, or get a cast protector from [facility], [facility], etc to keep the dressing dry until next change.    Medications/supplements to aid in healing, continue with:  -Vitamin D3 5,000 iu per day  -Kaylie C 1,000 mg take daily  -Vitamin B Complex with folic acid take 1 tablet daily  -Vitamin B 12 1000 mcg daily  -Lymphatic Formula 1000. Take (1) capsule once daily. Order from www.[employer] or [employer] or call [phone]. (The 1000mg is two (2) 500mg capsules. We are directing you to take one (1) capsule a day)  -One packet of Aleksandr Supplement into your favorite beverage twice a day    -Lymphedema pump for one hour twice a day  -Referral to [facility] - If you have not heard from them in a few days, please call [phone] to schedule    Wound Care recommendations to Right Anterior Lower Leg wound:  -Apply Cera-Ve lotion to intact skin on leg  -Apply Clobetasol cream to intact skin on leg  -Apply Red Stripe EdemaWear to from right knee to thigh  -Apply Navy Stripe EdemaWear to left toes to knee and red stripe edemawear knee to thigh  -Lymphedema wraps in place of Edema Wear is acceptable    Compression:  Please remove compression dressing if toes turn blue and/or tingle and can not be relieved by raising the leg for one hour. If unable to reapply in the morning, keep compression on until next dressing change.  Walk as much as you can, as you are able. When you sit raise your ankle above  your hips to promote wound healing.     DILLON Bullock M.D. October 19, 2022    Call us at [phone] if you have any questions about your wounds, have redness or swelling around your wound, have a fever of 101 or greater or if you have any other problems or concerns. We answer the phone Monday through Friday 8 am to 4 pm, please leave a message as we check the voicemail frequently throughout the day.     If you had a positive experience please indicate that on your patient satisfaction survey form that [facility] will be sending you.    It was a pleasure meeting with you today.  Thank you for allowing me and my team the privilege of caring for you today.  YOU are the reason we are here, and I truly hope we provided you with the excellent service you deserve.  Please let us know if there is anything else we can do for you so that we can be sure you are leaving completely satisfied with your care experience.      If you have any billing related questions please call the [facility] Business office at [phone]. The clinic staff does not handle billing related matters.    If you are scheduled to have a follow up appointment, you will receive a reminder call the day before your visit. On the appointment day please arrive 15 minutes prior to your appointment time. If you are unable to keep that appointment, please call the clinic to cancel or reschedule. If you are more than 10 minutes late or greater for your appointment, the clinic policy is that you may be asked to reschedule.            Gabriele Bullock MD on 10/19/2022 at 7:43 AM      Dictated using Dragon voice recognition software which may result in transcription errors

## 2022-10-19 NOTE — DISCHARGE INSTRUCTIONS
Elizabeth VERA Warren      1945    A DME order was not completed because supplies were not needed    -Please text a photo of your leg the day before or the day of your appointment to [phone]. Please include your name and birthdate.  -Contact [employer] for life long compression needs.  -OK to shower, coinciding with dressing reapplication, or get a cast protector from [facility], [facility], etc to keep the dressing dry until next change.    Medications/supplements to aid in healing, continue with:  -Vitamin D3 5,000 iu per day  -Kaylie C 1,000 mg take daily  -Vitamin B Complex with folic acid take 1 tablet daily  -Vitamin B 12 1000 mcg daily  -Lymphatic Formula 1000. Take (1) capsule once daily. Order from www.[employer] or [employer] or call [phone]. (The 1000mg is two (2) 500mg capsules. We are directing you to take one (1) capsule a day)  -One packet of Aleksandr Supplement into your favorite beverage twice a day    -Lymphedema pump for one hour twice a day  -Referral to [facility] - If you have not heard from them in a few days, please call [phone] to schedule    Wound Care recommendations to Right Anterior Lower Leg wound:  -Apply Cera-Ve lotion to intact skin on leg  -Apply Clobetasol cream to intact skin on leg  -Apply Red Stripe EdemaWear to from right knee to thigh  -Apply Navy Stripe EdemaWear to left toes to knee and red stripe edemawear knee to thigh  -Lymphedema wraps in place of Edema Wear is acceptable    Compression:  Please remove compression dressing if toes turn blue and/or tingle and can not be relieved by raising the leg for one hour. If unable to reapply in the morning, keep compression on until next dressing change.  Walk as much as you can, as you are able. When you sit raise your ankle above your hips to promote wound healing.     DILLON Bullock M.D. October 19, 2022    Call us at [phone] if you have any questions about your wounds, have redness or swelling around  your wound, have a fever of 101 or greater or if you have any other problems or concerns. We answer the phone Monday through Friday 8 am to 4 pm, please leave a message as we check the voicemail frequently throughout the day.     If you had a positive experience please indicate that on your patient satisfaction survey form that [facility] will be sending you.    It was a pleasure meeting with you today.  Thank you for allowing me and my team the privilege of caring for you today.  YOU are the reason we are here, and I truly hope we provided you with the excellent service you deserve.  Please let us know if there is anything else we can do for you so that we can be sure you are leaving completely satisfied with your care experience.      If you have any billing related questions please call the [facility] Business office at [phone]. The clinic staff does not handle billing related matters.    If you are scheduled to have a follow up appointment, you will receive a reminder call the day before your visit. On the appointment day please arrive 15 minutes prior to your appointment time. If you are unable to keep that appointment, please call the clinic to cancel or reschedule. If you are more than 10 minutes late or greater for your appointment, the clinic policy is that you may be asked to reschedule.

## 2022-11-07 ENCOUNTER — TELEPHONE (OUTPATIENT)
Dept: WOUND CARE | Facility: CLINIC | Age: 77
End: 2022-11-07

## 2022-11-10 ENCOUNTER — HOSPITAL ENCOUNTER (OUTPATIENT)
Dept: WOUND CARE | Facility: CLINIC | Age: 77
Discharge: HOME | End: 2022-11-10
Attending: SURGERY | Admitting: SURGERY
Payer: COMMERCIAL

## 2022-11-10 DIAGNOSIS — L97.912 ULCER OF RIGHT LOWER EXTREMITY WITH FAT LAYER EXPOSED (H): Primary | ICD-10-CM

## 2022-11-10 PROCEDURE — 99212 OFFICE O/P EST SF 10 MIN: CPT | Mod: GT | Performed by: SURGERY

## 2022-11-10 NOTE — DISCHARGE INSTRUCTIONS
Elizabeth R Warren      1945    A DME order for supplies has been placed to [facility]. If there are any issues with your order including not receiving the order please call [facility] at [phone] option 3. They can also provide a tracking number for you if you had supplies shipped to you.    Medications/supplements to aid in healing, continue with:  -Vitamin D3 5,000 iu per day  -Kaylie C 1,000 mg take daily  -Vitamin B Complex with folic acid take 1 tablet daily  -Vitamin B 12 1000 mcg daily  -Lymphatic Formula 1000. Take (1) capsule once daily. Order from  www.[employer] or [employer] or call [phone]. (The 1000mg is two (2)  500mg capsules. We are directing you to take one (1) capsule a day)  -One packet of Aleksandr Supplement into your favorite beverage twice a day    -Lymphedema pump for one hour twice a day      Wound Care recommendations to Right Anterior Lower Leg wound:  -Apply Cera-Ve lotion to intact skin on leg  -Apply Clobetasol cream to intact skin on leg  -Apply 1 2x2 endoform antimicrobial to wound  -Then cover with 1 5x9 ABD and secure with 1 roll gauze and tape  -Apply Red Stripe EdemaWear to from right knee to thigh  -Apply Navy Stripe EdemaWear to left toes to knee and red stripe edemawear knee to thigh  -Change daily    -Lymphedema wraps in place of Edema Wear is acceptable    Compression:  Please remove compression dressing if toes turn blue and/or tingle and can not be  relieved by raising the leg for one hour. If unable to reapply in the morning, keep  compression on until next dressing change.  Walk as much as you can, as you are able. When you sit raise your ankle above  your hips to promote wound healing.     DILLON Bullock M.D. November 10, 2022    Call us at [phone] if you have any questions about your wounds, have redness or swelling around your wound, have a fever of 101 or greater or if you have any other problems or concerns. We answer the phone  Monday through Friday 8 am to 4 pm, please leave a message as we check the voicemail frequently throughout the day.     If you had a positive experience please indicate that on your patient satisfaction survey form that [facility] will be sending you.    It was a pleasure meeting with you today.  Thank you for allowing me and my team the privilege of caring for you today.  YOU are the reason we are here, and I truly hope we provided you with the excellent service you deserve.  Please let us know if there is anything else we can do for you so that we can be sure you are leaving completely satisfied with your care experience.      If you have any billing related questions please call the [facility] Business office at [phone]. The clinic staff does not handle billing related matters.    If you are scheduled to have a follow up appointment, you will receive a reminder call the day before your visit. On the appointment day please arrive 15 minutes prior to your appointment time. If you are unable to keep that appointment, please call the clinic to cancel or reschedule. If you are more than 10 minutes late or greater for your appointment, the clinic policy is that you may be asked to reschedule.

## 2022-11-10 NOTE — PROGRESS NOTES
[facility] Wound Healing [place] Progress Note  Telehealth visit, wound healing [place], [facility], start time 12:39 PM, end time 12:41 PM subject:   Elizabeht Kelley reculture wound, requesting antimicrobial Endoform at this is worked well in the past, she does have a in person clinic visit pending, this is a stopgap measure as a telehealth visit to get her adequate product to assist in healing.    Patient Active Problem List   Diagnosis     Venous hypertension of lower extremity, bilateral     Acquired lymphedema of leg     Scar condition and fibrosis of skin     Ankle contracture, left     Morbid obesity with BMI of 50.0-59.9, adult (H)     Valgus deformity of both feet     Flat feet, bilateral     Gait abnormality     MS (multiple sclerosis) (H)     Depression     Vitamin D deficiency     GERD (gastroesophageal reflux disease)     Essential hypertension     History of malignant melanoma of skin     Edema     Major depression, single episode, in complete remission (H)     Menopausal and postmenopausal disorder     Mixed hyperlipidemia     Morbid obesity (H)     Peripheral venous insufficiency     Postmenopausal     Restless legs     Cellulitis of right lower extremity     Chronic pain     Ulcer of right lower extremity with fat layer exposed (H)     Pain associated with wound     Impaired glucose tolerance     Injury, unspecified, initial encounter     Iron deficiency anemia     Snoring     Past Medical History:   Diagnosis Date     Ankle contracture, left      Class 3 severe obesity due to excess calories without serious comorbidity with body mass index (BMI) of 45.0 to 49.9 in adult (H)      Combined gastric and duodenal ulcer      Controlled substance agreement broken      Depression      Dyslipidemia      Edema      Edema      Gait abnormality      GERD (gastroesophageal reflux disease)      Gout      Lymphedema of both lower extremities      Melanoma (H)     left upper arm     MS (multiple sclerosis)  (H)      RLS (restless legs syndrome)      Skin ulcer of left lower leg (H)      Valgus deformity of both feet      Vitamin D deficiency      Exam:  There were no vitals taken for this visit.  Wound Leg Other (comment);Ulceration (Active)       Wound (used by OP I only) 03/10/22 0900 Right lower;anterior leg ulceration, venous (Active)   Thickness/Stage full thickness 11/10/22 1329   Base red 11/10/22 1329   Periwound intact;swelling;redness 11/10/22 1329   Periwound Temperature warm 11/10/22 1329   Periwound Skin Turgor soft 11/10/22 1329   Edges open 11/10/22 1329   Length (cm) 1.5 11/10/22 1329   Width (cm) 4 11/10/22 1329   Depth (cm) 0.1 11/10/22 1329   Wound (cm^2) 6 cm^2 11/10/22 1329   Wound Volume (cm^3) 0.6 cm^3 11/10/22 1329   Wound healing % 99.29 11/10/22 1329   Drainage Characteristics/Odor serosanguineous 11/10/22 1329   Drainage Amount moderate 11/10/22 1329   Care, Wound non-select wound debridement performed 11/10/22 1329       Wound (used by OP I only) 03/10/22 0931 Right lower;lateral leg (Active)   Thickness/Stage other (see comments) 11/10/22 1329   Base granulating 09/07/22 1513   Periwound intact;redness;swelling 09/07/22 1513   Periwound Temperature warm 09/07/22 1513   Periwound Skin Turgor soft 09/07/22 1513   Edges open 09/07/22 1513   Length (cm) 3 09/07/22 1513   Width (cm) 3 09/07/22 1513   Depth (cm) 0.1 09/07/22 1513   Wound (cm^2) 9 cm^2 09/07/22 1513   Wound Volume (cm^3) 0.9 cm^3 09/07/22 1513   Wound healing % 64 09/07/22 1513   Drainage Characteristics/Odor serosanguineous;serous 09/07/22 1513   Drainage Amount moderate 09/07/22 1513   Care, Wound non-select wound debridement performed 09/07/22 1513       Wound (used by OP [facility]I only) 03/10/22 0939 Right posterior;lower leg (Active)   Thickness/Stage other (see comments) 11/10/22 1329   Base granulating 09/07/22 1513   Periwound intact;redness;swelling 09/07/22 1513   Periwound Temperature warm 09/07/22 1513   Periwound Skin  Turgor soft 09/07/22 1513   Edges open 09/07/22 1513   Length (cm) 1.3 09/07/22 1513   Width (cm) 1.3 09/07/22 1513   Depth (cm) 0.1 09/07/22 1513   Wound (cm^2) 1.69 cm^2 09/07/22 1513   Wound Volume (cm^3) 0.17 cm^3 09/07/22 1513   Wound healing % 89.44 09/07/22 1513   Drainage Characteristics/Odor serous 09/07/22 1513   Drainage Amount copious;small 09/07/22 1513   Care, Wound non-select wound debridement performed 09/07/22 1513       VASC Wound right lower leg (Active)       Incision/Surgical Site 03/21/22 Right;Lower Leg (Active)         Photograph reviewed    Impression: Lower extremity ulceration    Plan: We will dress the wounds with antimicrobial Endoform, change daily or every other day, follow-up in clinic in the next 1 to 2 weeks..        Further instructions from your care team           Further instructions from your care team       Elizabeth Kelley      1945    A DME order for supplies has been placed to [facility]. If there are any issues with your order including not receiving the order please call [facility] at [phone] option 3. They can also provide a tracking number for you if you had supplies shipped to you.    Medications/supplements to aid in healing, continue with:  -Vitamin D3 5,000 iu per day  -Kaylie C 1,000 mg take daily  -Vitamin B Complex with folic acid take 1 tablet daily  -Vitamin B 12 1000 mcg daily  -Lymphatic Formula 1000. Take (1) capsule once daily. Order from  www.[employer] or [employer] or call [phone]. (The 1000mg is two (2)  500mg capsules. We are directing you to take one (1) capsule a day)  -One packet of Aleksandr Supplement into your favorite beverage twice a day    -Lymphedema pump for one hour twice a day      Wound Care recommendations to Right Anterior Lower Leg wound:  -Apply Cera-Ve lotion to intact skin on leg  -Apply Clobetasol cream to intact skin on leg  -Apply 1 2x2 endoform antimicrobial to wound  -Then cover with 1 5x9 ABD  and secure with 1 roll gauze and tape  -Apply Red Stripe EdemaWear to from right knee to thigh  -Apply Navy Stripe EdemaWear to left toes to knee and red stripe edemawear knee to thigh  -Change daily    -Lymphedema wraps in place of Edema Wear is acceptable    Compression:  Please remove compression dressing if toes turn blue and/or tingle and can not be  relieved by raising the leg for one hour. If unable to reapply in the morning, keep  compression on until next dressing change.  Walk as much as you can, as you are able. When you sit raise your ankle above  your hips to promote wound healing.     DILLON Bullock M.D. November 10, 2022    Call us at [phone] if you have any questions about your wounds, have redness or swelling around your wound, have a fever of 101 or greater or if you have any other problems or concerns. We answer the phone Monday through Friday 8 am to 4 pm, please leave a message as we check the voicemail frequently throughout the day.     If you had a positive experience please indicate that on your patient satisfaction survey form that [facility] will be sending you.    It was a pleasure meeting with you today.  Thank you for allowing me and my team the privilege of caring for you today.  YOU are the reason we are here, and I truly hope we provided you with the excellent service you deserve.  Please let us know if there is anything else we can do for you so that we can be sure you are leaving completely satisfied with your care experience.      If you have any billing related questions please call the [facility] Business office at [phone]. The clinic staff does not handle billing related matters.    If you are scheduled to have a follow up appointment, you will receive a reminder call the day before your visit. On the appointment day please arrive 15 minutes prior to your appointment time. If you are unable to keep that appointment, please call the clinic to cancel or reschedule.  If you are more than 10 minutes late or greater for your appointment, the clinic policy is that you may be asked to reschedule.            Gabriele Bullock MD on 11/10/2022 at 12:39 PM

## 2022-11-10 NOTE — TELEPHONE ENCOUNTER
Returned call to patient and apologized for delay in response. Appointment made for a telephone visit today with Dr. Bullock to change the plan of care. She will be sending in the photos shortly.

## 2022-11-29 ENCOUNTER — HOSPITAL ENCOUNTER (OUTPATIENT)
Dept: WOUND CARE | Facility: CLINIC | Age: 77
Discharge: HOME | End: 2022-11-29
Attending: SURGERY
Payer: COMMERCIAL

## 2022-11-29 DIAGNOSIS — L97.912 ULCER OF RIGHT LOWER EXTREMITY WITH FAT LAYER EXPOSED (H): Primary | ICD-10-CM

## 2022-11-29 PROCEDURE — 99212 OFFICE O/P EST SF 10 MIN: CPT | Mod: 95 | Performed by: SURGERY

## 2022-11-29 NOTE — DISCHARGE INSTRUCTIONS
Elizabeth Kelley      1945    A DME order was not completed because supplies were not needed    Look at [employer] website to learn about wearable lymphedema pump discussed in visit.    Medications/supplements to aid in healing, continue with:  -Vitamin D3 5,000 iu per day  -Kaylie C 1,000 mg take daily  -Vitamin B Complex with folic acid take 1 tablet daily  -Vitamin B 12 1000 mcg daily  -Lymphatic Formula 1000. Take (1) capsule once daily. Order from www.[employer] or [employer] or call [phone]. (The 1000mg is two (2) 500mg capsules. We are directing you to take one (1) capsule a day)  -One packet of Aleksandr Supplement into your favorite beverage twice a day    -Lymphedema pump for one hour twice a day; see above regarding wearable pump.    Wound Care recommendations to Right Anterior Lower Leg wound:  -After cleansing with mild unscented soap and water, apply small amount of VASHE on gauze, lay into wound bed, let sit for 5-10 minutes, remove gauze (do not rinse) then apply dressing:   -Apply Cera-Ve lotion to intact skin on leg  -Apply Triamcinolone cream to intact skin on leg  -Apply Red Stripe EdemaWear to from right knee to thigh  -Apply Navy Stripe EdemaWear to left toes to knee and red stripe edemawear knee to thigh    Compression:  Please remove compression dressing if toes turn blue and/or tingle and can not be relieved by raising the leg for one hour.   If unable to reapply in the morning, keep compression on until next dressing change.  Walk as much as you can, as you are able. When you sit raise your ankle above your hips to promote wound healing.     DILLON Bullock M.D. November 29, 2022    Call us at [phone] if you have any questions about your wounds, have redness or swelling around your wound, have a fever of 101 or greater or if you have any other problems or concerns. We answer the phone Monday through Friday 8 am to 4 pm, please leave a message as we check the voicemail  frequently throughout the day.     If you had a positive experience please indicate that on your patient satisfaction survey form that [facility] will be sending you.    It was a pleasure meeting with you today.  Thank you for allowing me and my team the privilege of caring for you today.  YOU are the reason we are here, and I truly hope we provided you with the excellent service you deserve.  Please let us know if there is anything else we can do for you so that we can be sure you are leaving completely satisfied with your care experience.      If you have any billing related questions please call the [facility] Business office at [phone]. The clinic staff does not handle billing related matters.    If you are scheduled to have a follow up appointment, you will receive a reminder call the day before your visit. On the appointment day please arrive 15 minutes prior to your appointment time. If you are unable to keep that appointment, please call the clinic to cancel or reschedule. If you are more than 10 minutes late or greater for your appointment, the clinic policy is that you may be asked to reschedule.

## 2022-11-29 NOTE — PROGRESS NOTES
[facility] [place] Progress Note  Telehealth visit from Mary Razo, wound healing [place], start time 7:38 AM, end time 7:55 AM  Subject: Elizabeth Kelley struggling with current fixed point-of-care lymphedema pump, will have fitted for an ambulatory lymphedema pump that she can wear for 4 to 6 hours on a daily basis, this may be a better adjunctive management option.  She will also follow-up with lymphedema certified lymphedema therapist this week.  She remains on diosmin and PFF and utilizing EdemaWear on the thigh and the calf.    Patient Active Problem List   Diagnosis     Venous hypertension of lower extremity, bilateral     Acquired lymphedema of leg     Scar condition and fibrosis of skin     Ankle contracture, left     Morbid obesity with BMI of 50.0-59.9, adult (H)     Valgus deformity of both feet     Flat feet, bilateral     Gait abnormality     MS (multiple sclerosis) (H)     Depression     Vitamin D deficiency     GERD (gastroesophageal reflux disease)     Essential hypertension     History of malignant melanoma of skin     Edema     Major depression, single episode, in complete remission (H)     Menopausal and postmenopausal disorder     Mixed hyperlipidemia     Morbid obesity (H)     Peripheral venous insufficiency     Postmenopausal     Restless legs     Cellulitis of right lower extremity     Chronic pain     Ulcer of right lower extremity with fat layer exposed (H)     Pain associated with wound     Impaired glucose tolerance     Injury, unspecified, initial encounter     Iron deficiency anemia     Snoring     Past Medical History:   Diagnosis Date     Ankle contracture, left      Class 3 severe obesity due to excess calories without serious comorbidity with body mass index (BMI) of 45.0 to 49.9 in adult (H)      Combined gastric and duodenal ulcer      Controlled substance agreement broken      Depression      Dyslipidemia      Edema      Edema      Gait abnormality      GERD  (gastroesophageal reflux disease)      Gout      Lymphedema of both lower extremities      Melanoma (H)     left upper arm     MS (multiple sclerosis) (H)      RLS (restless legs syndrome)      Skin ulcer of left lower leg (H)      Valgus deformity of both feet      Vitamin D deficiency      Exam:  There were no vitals taken for this visit.  Wound Leg Other (comment);Ulceration (Active)       Wound (used by [facility] only) 03/10/22 0900 Right lower;anterior leg ulceration, venous (Active)   Thickness/Stage full thickness 11/10/22 1329   Base red 11/10/22 1329   Periwound intact;swelling;redness 11/10/22 1329   Periwound Temperature warm 11/10/22 1329   Periwound Skin Turgor soft 11/10/22 1329   Edges open 11/10/22 1329   Length (cm) 1.5 11/10/22 1329   Width (cm) 4 11/10/22 1329   Depth (cm) 0.1 11/10/22 1329   Wound (cm^2) 6 cm^2 11/10/22 1329   Wound Volume (cm^3) 0.6 cm^3 11/10/22 1329   Wound healing % 99.29 11/10/22 1329   Drainage Characteristics/Odor serosanguineous 11/10/22 1329   Drainage Amount moderate 11/10/22 1329   Care, Wound non-select wound debridement performed 11/10/22 1329       Wound (used by [facility] only) 03/10/22 0931 Right lower;lateral leg (Active)   Thickness/Stage other (see comments) 11/10/22 1329   Base granulating 09/07/22 1513   Periwound intact;redness;swelling 09/07/22 1513   Periwound Temperature warm 09/07/22 1513   Periwound Skin Turgor soft 09/07/22 1513   Edges open 09/07/22 1513   Length (cm) 3 09/07/22 1513   Width (cm) 3 09/07/22 1513   Depth (cm) 0.1 09/07/22 1513   Wound (cm^2) 9 cm^2 09/07/22 1513   Wound Volume (cm^3) 0.9 cm^3 09/07/22 1513   Wound healing % 64 09/07/22 1513   Drainage Characteristics/Odor serosanguineous;serous 09/07/22 1513   Drainage Amount moderate 09/07/22 1513   Care, Wound non-select wound debridement performed 09/07/22 1513       Wound (used by OP [facility] only) 03/10/22 0939 Right posterior;lower leg (Active)   Thickness/Stage other (see comments)  11/10/22 1329   Base granulating 09/07/22 1513   Periwound intact;redness;swelling 09/07/22 1513   Periwound Temperature warm 09/07/22 1513   Periwound Skin Turgor soft 09/07/22 1513   Edges open 09/07/22 1513   Length (cm) 1.3 09/07/22 1513   Width (cm) 1.3 09/07/22 1513   Depth (cm) 0.1 09/07/22 1513   Wound (cm^2) 1.69 cm^2 09/07/22 1513   Wound Volume (cm^3) 0.17 cm^3 09/07/22 1513   Wound healing % 89.44 09/07/22 1513   Drainage Characteristics/Odor serous 09/07/22 1513   Drainage Amount copious;small 09/07/22 1513   Care, Wound non-select wound debridement performed 09/07/22 1513       VASC Wound right lower leg (Active)       Incision/Surgical Site 03/21/22 Right;Lower Leg (Active)     Photographs reviewed        Impression: Stage III lymphedema, chronic ulcerations, right lower extremity    Plan: Plan to have a fitting for ambulatory lymphedema pump in the clinic on Tuesday, February 7, 2023, continue utilization of EdemaWear, diosmin and PFF, adjunctive micronutrients, certified lymphedema therapy evaluation.  Application of ceramide-based lotion and/or triamcinolone cream.      Further instructions from your care team       Elizabeth Kelley      1945    A DME order was not completed because supplies were not needed    Look at [facility] website to learn about wearable lymphedema pump discussed in visit.    Medications/supplements to aid in healing, continue with:  -Vitamin D3 5,000 iu per day  -Kaylie C 1,000 mg take daily  -Vitamin B Complex with folic acid take 1 tablet daily  -Vitamin B 12 1000 mcg daily  -Lymphatic Formula 1000. Take (1) capsule once daily. Order from www.[employer] or [employer] or call [phone]. (The 1000mg is two (2) 500mg capsules. We are directing you to take one (1) capsule a day)  -One packet of Aleksandr Supplement into your favorite beverage twice a day    -Lymphedema pump for one hour twice a day; see above regarding wearable pump.    Wound Care recommendations to  Right Anterior Lower Leg wound:  -After cleansing with mild unscented soap and water, apply small amount of VASHE on gauze, lay into wound bed, let sit for 5-10 minutes, remove gauze (do not rinse) then apply dressing:   -Apply Cera-Ve lotion to intact skin on leg  -Apply Triamcinolone cream to intact skin on leg  -Apply Red Stripe EdemaWear to from right knee to thigh  -Apply Navy Stripe EdemaWear to left toes to knee and red stripe edemawear knee to thigh    Compression:  Please remove compression dressing if toes turn blue and/or tingle and can not be relieved by raising the leg for one hour.   If unable to reapply in the morning, keep compression on until next dressing change.  Walk as much as you can, as you are able. When you sit raise your ankle above your hips to promote wound healing.     DILLON Bullock M.D. November 29, 2022    Call us at [phone] if you have any questions about your wounds, have redness or swelling around your wound, have a fever of 101 or greater or if you have any other problems or concerns. We answer the phone Monday through Friday 8 am to 4 pm, please leave a message as we check the voicemail frequently throughout the day.     If you had a positive experience please indicate that on your patient satisfaction survey form that [facility] will be sending you.    It was a pleasure meeting with you today.  Thank you for allowing me and my team the privilege of caring for you today.  YOU are the reason we are here, and I truly hope we provided you with the excellent service you deserve.  Please let us know if there is anything else we can do for you so that we can be sure you are leaving completely satisfied with your care experience.      If you have any billing related questions please call the [facility] Business office at [phone]. The clinic staff does not handle billing related matters.    If you are scheduled to have a follow up appointment, you will receive a reminder  call the day before your visit. On the appointment day please arrive 15 minutes prior to your appointment time. If you are unable to keep that appointment, please call the clinic to cancel or reschedule. If you are more than 10 minutes late or greater for your appointment, the clinic policy is that you may be asked to reschedule.           Gabriele Bullock MD on 11/29/2022 at 7:37 AM

## 2022-12-12 ENCOUNTER — HOSPITAL ENCOUNTER (OUTPATIENT)
Dept: PHYSICAL THERAPY | Facility: REHABILITATION | Age: 77
Discharge: HOME | End: 2022-12-12
Attending: SURGERY
Payer: COMMERCIAL

## 2022-12-12 DIAGNOSIS — I89.0 LYMPHEDEMA OF BOTH LOWER EXTREMITIES: ICD-10-CM

## 2022-12-12 PROCEDURE — 97162 PT EVAL MOD COMPLEX 30 MIN: CPT | Mod: GP | Performed by: PHYSICAL THERAPIST

## 2022-12-12 PROCEDURE — 97140 MANUAL THERAPY 1/> REGIONS: CPT | Mod: GP | Performed by: PHYSICAL THERAPIST

## 2022-12-13 NOTE — PROGRESS NOTES
12/12/22 1300   Rehab Discipline   Discipline PT   Type of Visit   Type of visit Initial Edema Evaluation   General Information   Start of care 12/12/22   Referring physician Gabriele Bullock   Orders Evaluate and treat as indicated   Order date 10/19/22   Medical diagnosis Lymphedema of both lower extremities   Onset of illness / date of surgery 10/19/22   Edema onset 10/19/22   Affected body parts RLE;LLE   Edema etiology Chronic Venous Insufficiency;Surgery  (obesity, lymph nodes removed from B popliteal and inguinal regions)   Surgical / medical history reviewed Yes   Fall Risk Screen   Fall screen completed by PT   Have you fallen 2 or more times in the past year? No   Have you fallen and had an injury in the past year? Yes   Is patient a fall risk? No   Abuse Screen (yes response referral indicated)   Feels Unsafe at Home or Work/School no   Feels Threatened by Someone no   Does Anyone Try to Keep You From Having Contact with Others or Doing Things Outside Your Home? no   Physical Signs of Abuse Present no   Patient needs abuse support services and resources No   System Outcome Measures   Outcome Measures Lymphedema   Lymphedema Life Impact Scale (score range 0-72). A higher score indicates greater impairment.   (Did not complete)   Subjective Report   Patient report of symptoms Pt is a 77 year-old woman with chief complaint B LE swelling. PMH venous hypertension, morbid obesity, MS, venous insufficiency. Pt has a Flexitouch pump at home, but she was told about a new pump that can be worn while walking and she will be fitted for this in early 2023. She has been seen for wounds on her R leg since March 2022 and certain areas are completely healed, but there are still open areas on the proximal lower leg. She has used compression garments in the past - Velcro wraps, socks. She reports that compression wrapping/bandaging in the past doesn t stay up. She currently wears EdemaWear all day and overnight (and will  wear it on the thighs at night). She reports that the EdemaWear maintains the size of the legs much better than other compression garments did. Has history of lymph node removal in the popliteal and groin region (pt reports that all of the groin lymph nodes were removed). Some days the swelling is worse - in the morning the legs are usually very good, but as the day goes on it worsens. It's hard to elevate the legs multiple times.   Patient / Family Goals   Patient / family goals statement To reduce the size of the legs, get the fluid to move up/out prior to getting her ambulatory pump   Pain   Patient currently in pain No   Edema Exam / Assessment   Skin condition Pitting;Dryness   Pitting 2+   Scar Yes   Location B LEs medially   Mobility mild restriction   Stemmer sign Positive   Stemmer sign comments B   Ulceration Yes   Ulceration comments R lower leg anterior - just distal to knee has broad area of multiple open wounds (stuck in several places to gauze bandaging); red in most areas   Girth Measurements   Girth Measurements Refer to separate girth measurement flowsheet   Bed Mobility   Bed mobility Difficulty with sit<>supine, slow   Transfers   Transfers Multiple attempts for sit<>stand with UE support   Gait / Locomotion   Gait / Locomotion Uses NBQC with slow pace, B foot abduction and B trendelenburg (pt also has a 4WW and SEC at home)   Planned Edema Interventions   Planned edema interventions Manual lymph drainage;Gradient compression bandaging;Fit for compression garment;Exercises;Precautions to prevent infection / exacerbation;Education;Manual therapy;Skin care / precautions   Clinical Impression   Criteria for skilled therapeutic intervention met Yes   Therapy diagnosis Secondary lymphedema   Influenced by the following impairments / conditions Stage 3;Wounds / Ulcers   Functional limitations due to impairments / conditions Difficulty walking, difficulty with transfers, difficulty fitting into  clothing, at risk for infections   Clinical Presentation Evolving/Changing   Clinical Presentation Rationale chronic non-healing wounds which continue to improve and regress over time   Clinical Decision Making (Complexity) Moderate complexity   Treatment Frequency 2x/week;3x/week   Treatment duration 15-20 visit   Patient / family and/or staff in agreement with plan of care Yes   Risks and benefits of therapy have been explained Yes   Clinical impression comments Pt is a 77 year-old woman with chief complaint B LE swelling with a reported history of B inguinal and popliteal lymph node removal. She has struggled with R lower leg wounds this past year requiring surgical debridement and continued follow up with wound care specialist; she has wound care supplies at home and changes bandages every 1-2 days. PMH significant for venous hypertension, morbid obesity, MS, venous insufficiency. She demonstrates stage 3 B LE secondary lymphedema with multiple open wounds in the right lower leg just distal to the knee; previous wounds on the shin and ankle have healed with new wounds appearing proximally. Pt has a pneumatic pump at home, but plans to be fitted for a new ambulatory pump in 2023. She reports history of using various compression garments (socks, Velcro wraps, etc.) with her preference being use of EdemaWear for the comfort and efficacy. She would benefit from decongestive therapy to reduce size of LEs to allow healing of the R leg wounds.   Goals   Edema Eval Goals 1;2   Goal 1   Goal identifier Wounds   Goal description Pt will demonstrate closure of all wounds in the R lower leg to decrease risk of infection   Target date 03/11/23   Goal 2   Goal identifier Volume   Goal description Pt will demonstrate reduction in B LE volume of 5-10% to assist with wound healing and improve fit in clothing.   Target date 03/11/23   Total Evaluation Time   PT [place], Moderate Complexity Minutes (13422) 32       Peg Freeman  PT, DPT, CLT  12/12/2022

## 2022-12-22 ENCOUNTER — TELEPHONE (OUTPATIENT)
Dept: WOUND CARE | Facility: CLINIC | Age: 77
End: 2022-12-22

## 2022-12-22 ENCOUNTER — HOSPITAL ENCOUNTER (OUTPATIENT)
Dept: WOUND CARE | Facility: CLINIC | Age: 77
Discharge: HOME | End: 2022-12-22
Attending: SURGERY
Payer: COMMERCIAL

## 2022-12-22 DIAGNOSIS — L97.912 ULCER OF RIGHT LOWER EXTREMITY WITH FAT LAYER EXPOSED (H): Primary | ICD-10-CM

## 2022-12-22 PROCEDURE — 99212 OFFICE O/P EST SF 10 MIN: CPT | Mod: 95 | Performed by: SURGERY

## 2022-12-22 RX ORDER — TRIAMCINOLONE ACETONIDE 5 MG/G
CREAM TOPICAL DAILY
Qty: 454 G | Refills: 0 | Status: SHIPPED | OUTPATIENT
Start: 2022-12-22 | End: 2024-07-16

## 2022-12-22 NOTE — DISCHARGE INSTRUCTIONS
"Elizabeth VERA Warren      1945    A DME order for supplies has been placed to [facility]. If there are any issues with your order including not receiving the order please call [facility] at [phone] option 3. They can also provide a tracking number for you if you had supplies shipped to you.    Look at [facility] website to learn about wearable lymphedema pump discussed in visit.    Medications/supplements to aid in healing, continue with:  -Vitamin D3 5,000 iu per day  -Kaylie C 1,000 mg take daily  -Vitamin B Complex with folic acid take 1 tablet daily  -Vitamin B 12 1000 mcg daily  -Lymphatic Formula 1000. Take (1) capsule once daily. Order from www.[employer] or [employer] or call [phone]. (The 1000mg is two (2)  500mg capsules. We are directing you to take one (1) capsule a day)  -One packet of Aleksandr Supplement into your favorite beverage twice a day  -Lymphedema pump for one hour twice a day; see above regarding wearable pump.    Wound Care recommendations to Right Anterior Lower Leg wound:  -Apply Cera-Ve lotion to intact skin on leg  -Apply Triamcinolone cream to red/irritated skin around wounds  -Apply 1-2 Vashe-moistened gauze 4x4s  -Apply Red Stripe EdemaWear to from right knee to thigh  -Apply Navy Stripe EdemaWear to left toes to knee and red stripe edemawear knee to thigh  -On top of Edemawear, apply 1 5x9\" ABD pad & roll gauze/tape  -Apply lymphedema stocking with velcro compression    Compression:  Please remove compression dressing if toes turn blue and/or tingle and can not be relieved by raising the leg for one hour.  If unable to reapply in the morning, keep compression on until next dressing change.    Walk as much as you can, as you are able. When you sit raise your ankle above your hips to promote wound healing.     DILLON Bullock M.D. December 22, 2022    Call us at [phone] if you have any questions about your wounds, have redness or swelling around " your wound, have a fever of 101 or greater or if you have any other problems or concerns. We answer the phone Monday through Friday 8 am to 4 pm, please leave a message as we check the voicemail frequently throughout the day.     If you had a positive experience please indicate that on your patient satisfaction survey form that [facility] will be sending you.    It was a pleasure meeting with you today.  Thank you for allowing me and my team the privilege of caring for you today.  YOU are the reason we are here, and I truly hope we provided you with the excellent service you deserve.  Please let us know if there is anything else we can do for you so that we can be sure you are leaving completely satisfied with your care experience.      If you have any billing related questions please call the [facility] Business office at [phone]. The clinic staff does not handle billing related matters.    If you are scheduled to have a follow up appointment, you will receive a reminder call the day before your visit. On the appointment day please arrive 15 minutes prior to your appointment time. If you are unable to keep that appointment, please call the clinic to cancel or reschedule. If you are more than 10 minutes late or greater for your appointment, the clinic policy is that you may be asked to reschedule.

## 2022-12-22 NOTE — PROGRESS NOTES
[facility]ound [facility]aling [place] Progress Note  Telehealth visit from [facility] wound clinic, start time 1245, end time 12:47 PM  Subject: Elizabeth Kelley chronic lymphedema of the lower extremities, she is scheduled to be seen on February 7 clinic for evaluation of an ambulatory neck nonpneumatic, and lower extremities.  She is on chronic M PFF.  Her, standing time has increased recently in preparation for placement, otherwise she typically is elevating her legs approximately 6 hours a day.    Patient Active Problem List   Diagnosis     Venous hypertension of lower extremity, bilateral     Acquired lymphedema of leg     Scar condition and fibrosis of skin     Ankle contracture, left     Morbid obesity with BMI of 50.0-59.9, adult (H)     Valgus deformity of both feet     Flat feet, bilateral     Gait abnormality     MS (multiple sclerosis) (H)     Depression     Vitamin D deficiency     GERD (gastroesophageal reflux disease)     Essential hypertension     History of malignant melanoma of skin     Edema     Major depression, single episode, in complete remission (H)     Menopausal and postmenopausal disorder     Mixed hyperlipidemia     Morbid obesity (H)     Peripheral venous insufficiency     Postmenopausal     Restless legs     Cellulitis of right lower extremity     Chronic pain     Ulcer of right lower extremity with fat layer exposed (H)     Pain associated with wound     Impaired glucose tolerance     Injury, unspecified, initial encounter     Iron deficiency anemia     Snoring     Past Medical History:   Diagnosis Date     Ankle contracture, left      Class 3 severe obesity due to excess calories without serious comorbidity with body mass index (BMI) of 45.0 to 49.9 in adult (H)      Combined gastric and duodenal ulcer      Controlled substance agreement broken      Depression      Dyslipidemia      Edema      Edema      Gait abnormality      GERD (gastroesophageal reflux disease)      Gout      Lymphedema  of both lower extremities      Melanoma (H)     left upper arm     MS (multiple sclerosis) (H)      RLS (restless legs syndrome)      Skin ulcer of left lower leg (H)      Valgus deformity of both feet      Vitamin D deficiency      Exam:  There were no vitals taken for this visit.  Wound Leg Other (comment);Ulceration (Active)       Wound (used by OP [facility] only) 03/10/22 0900 Right lower;anterior leg ulceration, venous (Active)   Thickness/Stage partial thickness 12/22/22 1242   Base red 12/22/22 1242   Periwound intact;swelling;redness 12/22/22 1242   Periwound Temperature warm 12/22/22 1242   Periwound Skin Turgor soft 12/22/22 1242   Edges open 12/22/22 1242   Length (cm) 8 12/22/22 1242   Width (cm) 20 12/22/22 1242   Depth (cm) 0.1 12/22/22 1242   Wound (cm^2) 160 cm^2 12/22/22 1242   Wound Volume (cm^3) 16 cm^3 12/22/22 1242   Wound healing % 80.95 12/22/22 1242   Drainage Characteristics/Odor serosanguineous 12/22/22 1242   Drainage Amount moderate 12/22/22 1242   Care, Wound non-select wound debridement performed 12/22/22 1242       Wound (used by OP [facility] only) 03/10/22 0931 Right lower;lateral leg (Active)   Thickness/Stage other (see comments) 11/10/22 1329   Base granulating 09/07/22 1513   Periwound intact;redness;swelling 09/07/22 1513   Periwound Temperature warm 09/07/22 1513   Periwound Skin Turgor soft 09/07/22 1513   Edges open 09/07/22 1513   Length (cm) 3 09/07/22 1513   Width (cm) 3 09/07/22 1513   Depth (cm) 0.1 09/07/22 1513   Wound (cm^2) 9 cm^2 09/07/22 1513   Wound Volume (cm^3) 0.9 cm^3 09/07/22 1513   Wound healing % 64 09/07/22 1513   Drainage Characteristics/Odor serosanguineous;serous 09/07/22 1513   Drainage Amount moderate 09/07/22 1513   Care, Wound non-select wound debridement performed 09/07/22 1513       Wound (used by OP [facility] only) 03/10/22 0939 Right posterior;lower leg (Active)   Thickness/Stage other (see comments) 11/10/22 1329   Base granulating 09/07/22 1513   Periwound  intact;redness;swelling 09/07/22 1513   Periwound Temperature warm 09/07/22 1513   Periwound Skin Turgor soft 09/07/22 1513   Edges open 09/07/22 1513   Length (cm) 1.3 09/07/22 1513   Width (cm) 1.3 09/07/22 1513   Depth (cm) 0.1 09/07/22 1513   Wound (cm^2) 1.69 cm^2 09/07/22 1513   Wound Volume (cm^3) 0.17 cm^3 09/07/22 1513   Wound healing % 89.44 09/07/22 1513   Drainage Characteristics/Odor serous 09/07/22 1513   Drainage Amount copious;small 09/07/22 1513   Care, Wound non-select wound debridement performed 09/07/22 1513       VASC Wound right lower leg (Active)       Incision/Surgical Site 03/21/22 Right;Lower Leg (Active)     Photographs reviewed        Impression: Chronic lymphedema with ulcerations, overall improved from July 2022  Plan: We will dress the wounds with hypochlorous acid Vashe, applied wound, and or change dressings on a daily basis rather than every other day, scheduled for February 7 ambulatory nonpneumatic compression device, will have a telehealth visit prior.  M PFF.  Daily utilization, lifelong.      Further instructions from your care team       Elizabeth Kelley      1945        A DME order was not completed because supplies were not needed  Look at [facility] website to learn about wearable lymphedema pump discussed in visit.    Medications/supplements to aid in healing, continue with:  -Vitamin D3 5,000 iu per day  -Kaylie C 1,000 mg take daily  -Vitamin B Complex with folic acid take 1 tablet daily  -Vitamin B 12 1000 mcg daily  -Lymphatic Formula 1000. Take (1) capsule once daily. Order from www.[employer] or [employer] or call [phone]. (The 1000mg is two (2)  500mg capsules. We are directing you to take one (1) capsule a day)  -One packet of Aleksandr Supplement into your favorite beverage twice a day  -Lymphedema pump for one hour twice a day; see above regarding wearable pump.    Wound Care recommendations to Right Anterior Lower Leg wound:  -After cleansing with mild  unscented soap and water, apply small amount of VASHE on  gauze, lay into wound bed, let sit for 5-10 minutes, remove gauze (do not rinse) then  apply dressing:  -Apply Cera-Ve lotion to intact skin on leg  -Apply Triamcinolone cream to intact skin on leg  -Apply Red Stripe EdemaWear to from right knee to thigh  -Apply Navy Stripe EdemaWear to left toes to knee and red stripe edemawear knee to  thigh  Compression:  Please remove compression dressing if toes turn blue and/or tingle and can not be  relieved by raising the leg for one hour.  If unable to reapply in the morning, keep compression on until next dressing change.    Walk as much as you can, as you are able. When you sit raise your ankle above  your hips to promote wound healing.     DILLON Bullock M.D. December 22, 2022    Call us at [phone] if you have any questions about your wounds, have redness or swelling around your wound, have a fever of 101 or greater or if you have any other problems or concerns. We answer the phone Monday through Friday 8 am to 4 pm, please leave a message as we check the voicemail frequently throughout the day.     If you had a positive experience please indicate that on your patient satisfaction survey form that [facility] will be sending you.    It was a pleasure meeting with you today.  Thank you for allowing me and my team the privilege of caring for you today.  YOU are the reason we are here, and I truly hope we provided you with the excellent service you deserve.  Please let us know if there is anything else we can do for you so that we can be sure you are leaving completely satisfied with your care experience.      If you have any billing related questions please call the [facility] Business office at [phone]. The clinic staff does not handle billing related matters.    If you are scheduled to have a follow up appointment, you will receive a reminder call the day before your visit. On the appointment day  please arrive 15 minutes prior to your appointment time. If you are unable to keep that appointment, please call the clinic to cancel or reschedule. If you are more than 10 minutes late or greater for your appointment, the clinic policy is that you may be asked to reschedule.           Gabriele Bullock MD on 12/22/2022 at 12:46 PM            Dictated using Dragon voice recognition software which may result in transcription errors

## 2023-01-05 DIAGNOSIS — L97.912 NON-PRESSURE CHRONIC ULCER OF UNSPECIFIED PART OF RIGHT LOWER LEG WITH FAT LAYER EXPOSED (H): ICD-10-CM

## 2023-01-10 ENCOUNTER — HOSPITAL ENCOUNTER (OUTPATIENT)
Dept: PHYSICAL THERAPY | Facility: REHABILITATION | Age: 78
Discharge: HOME | End: 2023-01-10
Payer: COMMERCIAL

## 2023-01-10 DIAGNOSIS — I89.0 LYMPHEDEMA: ICD-10-CM

## 2023-01-10 DIAGNOSIS — M62.81 GENERALIZED MUSCLE WEAKNESS: Primary | ICD-10-CM

## 2023-01-10 DIAGNOSIS — L97.811 VENOUS STASIS ULCER OF OTHER PART OF RIGHT LOWER LEG LIMITED TO BREAKDOWN OF SKIN WITH VARICOSE VEINS (H): ICD-10-CM

## 2023-01-10 DIAGNOSIS — I89.0 LYMPHEDEMA OF BOTH LOWER EXTREMITIES: ICD-10-CM

## 2023-01-10 DIAGNOSIS — I83.018 VENOUS STASIS ULCER OF OTHER PART OF RIGHT LOWER LEG LIMITED TO BREAKDOWN OF SKIN WITH VARICOSE VEINS (H): ICD-10-CM

## 2023-01-10 DIAGNOSIS — R22.43 LOCALIZED SWELLING OF BOTH LOWER LEGS: ICD-10-CM

## 2023-01-10 PROCEDURE — 97140 MANUAL THERAPY 1/> REGIONS: CPT | Mod: CQ | Performed by: PHYSICAL THERAPY ASSISTANT

## 2023-01-13 ENCOUNTER — HOSPITAL ENCOUNTER (OUTPATIENT)
Dept: PHYSICAL THERAPY | Facility: REHABILITATION | Age: 78
Discharge: HOME | End: 2023-01-13
Payer: COMMERCIAL

## 2023-01-13 DIAGNOSIS — I89.0 LYMPHEDEMA OF BOTH LOWER EXTREMITIES: Primary | ICD-10-CM

## 2023-01-13 DIAGNOSIS — I89.0 LYMPHEDEMA: ICD-10-CM

## 2023-01-13 PROCEDURE — 97140 MANUAL THERAPY 1/> REGIONS: CPT | Mod: GP | Performed by: PHYSICAL THERAPIST

## 2023-01-16 ENCOUNTER — HOSPITAL ENCOUNTER (OUTPATIENT)
Dept: PHYSICAL THERAPY | Facility: REHABILITATION | Age: 78
Discharge: HOME | End: 2023-01-16
Payer: COMMERCIAL

## 2023-01-16 DIAGNOSIS — I89.0 LYMPHEDEMA OF BOTH LOWER EXTREMITIES: Primary | ICD-10-CM

## 2023-01-16 PROCEDURE — 97140 MANUAL THERAPY 1/> REGIONS: CPT | Mod: GP | Performed by: PHYSICAL THERAPIST

## 2023-01-16 NOTE — ADDENDUM NOTE
Encounter addended by: Peg Freeman, PT on: 1/16/2023 12:10 PM   Actions taken: Charge Capture section accepted

## 2023-01-23 ENCOUNTER — HOSPITAL ENCOUNTER (OUTPATIENT)
Dept: WOUND CARE | Facility: CLINIC | Age: 78
Discharge: HOME | End: 2023-01-23
Attending: SURGERY
Payer: COMMERCIAL

## 2023-01-23 DIAGNOSIS — L97.912 ULCER OF RIGHT LOWER EXTREMITY WITH FAT LAYER EXPOSED (H): Primary | ICD-10-CM

## 2023-01-23 DIAGNOSIS — I89.0 ACQUIRED LYMPHEDEMA OF LEG: ICD-10-CM

## 2023-01-23 PROCEDURE — 99212 OFFICE O/P EST SF 10 MIN: CPT | Mod: TEL | Performed by: SURGERY

## 2023-01-23 NOTE — PROGRESS NOTES
[facility]aling [place] Progress Note  Telehealth visit, start time 3:42 PM, end time 3:48 PM, [facility]  Subject: Elizabeth Kelley chronic bilateral extremity lymphedema, right lower extremity ulceration with lymphorrhea.  She is utilizing EdemaWear in her left leg, we encouraged her to use legs EdemaWear on the right leg, she does have a lymphedema pump she is using for an hour a day, she will be seen for a dayspring nonpneumatic ambulatory compression device on February 7.    Patient Active Problem List   Diagnosis     Venous hypertension of lower extremity, bilateral     Acquired lymphedema of leg     Scar condition and fibrosis of skin     Ankle contracture, left     Morbid obesity with BMI of 50.0-59.9, adult (H)     Valgus deformity of both feet     Flat feet, bilateral     Gait abnormality     MS (multiple sclerosis) (H)     Depression     Vitamin D deficiency     GERD (gastroesophageal reflux disease)     Essential hypertension     History of malignant melanoma of skin     Edema     Major depression, single episode, in complete remission (H)     Menopausal and postmenopausal disorder     Mixed hyperlipidemia     Morbid obesity (H)     Peripheral venous insufficiency     Postmenopausal     Restless legs     Cellulitis of right lower extremity     Chronic pain     Ulcer of right lower extremity with fat layer exposed (H)     Pain associated with wound     Impaired glucose tolerance     Injury, unspecified, initial encounter     Iron deficiency anemia     Snoring     Past Medical History:   Diagnosis Date     Ankle contracture, left      Class 3 severe obesity due to excess calories without serious comorbidity with body mass index (BMI) of 45.0 to 49.9 in adult (H)      Combined gastric and duodenal ulcer      Controlled substance agreement broken      Depression      Dyslipidemia      Edema      Edema      Gait abnormality      GERD (gastroesophageal reflux disease)      Gout       Lymphedema of both lower extremities      Melanoma (H)     left upper arm     MS (multiple sclerosis) (H)      RLS (restless legs syndrome)      Skin ulcer of left lower leg (H)      Valgus deformity of both feet      Vitamin D deficiency      Exam:  There were no vitals taken for this visit.  Wound Leg Other (comment);Ulceration (Active)       Wound (used by OP [facility] only) 03/10/22 0900 Right lower;anterior leg ulceration, venous (Active)       Wound (used by OP [facility] only) 03/10/22 0931 Right lower;lateral leg (Active)       Wound (used by OP [facility] only) 03/10/22 0939 Right posterior;lower leg (Active)       VASC Wound right lower leg (Active)       Incision/Surgical Site 03/21/22 Right;Lower Leg (Active)     Telehealth visit, photographs reviewed, ulceration right leg        Impression: Chronic by lower extremity lymphedema, ulceration right lower extremity    Plan: We will dress the wounds with antimicrobial Endoform after hypochlorous acid Vashe soak, application of EdemaWear to both lower extremities, utilization of inelastic compression, lymphedema pump 1 hour on a daily basis, MPFF utilization daily.  Patient will return to the clinic in 2 weeks time        Gabriele Bullock MD on 1/23/2023 at 3:39 PM            Dictated using Dragon voice recognition software which may result in transcription errors

## 2023-01-23 NOTE — DISCHARGE INSTRUCTIONS
"Elizabeth JODY Kelley      1945     A DME order for supplies has been placed to [employer].       Look at [employer] website to learn about wearable lymphedema pump  discussed prior.    Medications/supplements to aid in healing, continue with:  -Vitamin D3 5,000 iu per day  -Kaylie C 1,000 mg take daily  -Vitamin B Complex with folic acid take 1 tablet daily  -Vitamin B 12 1000 mcg daily  -Lymphatic Formula 1000. Take (1) capsule once daily. Order from  www.[employer] or [employer] or call [phone]. (The 1000mg is two (2)  500mg capsules. We are directing you to take one (1) capsule a day)  -One packet of Aleksandr Supplement into your favorite beverage twice a day    Lymphedema pump for one hour twice a day; see above regarding wearable pump.    Wound Care recommendations to   RIGHT Anterior Lower Leg wound:  After cleansing with mild unscented soap (such as Cetaphil, Cerave or Dove) and water, Apply small amount of VASHE on gauze, lay into wound bed, let sit for 10 -15 minutes, remove gauze (do not rinse) t   -Apply Cera-Ve lotion to intact skin on leg  -Apply Triamcinolone 0.1 % cream to red/irritated skin around wounds  -Apply  6x8.5 Endoform to your wound in shape of your wound  -Apply Red Stripe EdemaWear to from right knee, change to Navy stripe later when you able to tolerate  -On top of Edemawear, apply 1  5x9\" ABD pad & roll gauze/tape  -Apply lymphedema stocking with velcro compression  CHANGE EVERY DAY     Compression:  Please remove compression dressing if toes turn blue and/or tingle and can not be  relieved by raising the leg for one hour.     DILLON Bullock M.D. January 23, 2023    Call us at [phone] if you have any questions about your wounds, have redness or swelling around your wound, have a fever of 101 or greater or if you have any other problems or concerns. We answer the phone Monday through Friday 8 am to 4 pm, please leave a message as we check the voicemail frequently " throughout the day.     If you had a positive experience please indicate that on your patient satisfaction survey form that [facility] will be sending you.    It was a pleasure meeting with you today.  Thank you for allowing me and my team the privilege of caring for you today.  YOU are the reason we are here, and I truly hope we provided you with the excellent service you deserve.  Please let us know if there is anything else we can do for you so that we can be sure you are leaving completely satisfied with your care experience.      If you have any billing related questions please call the [facility] Business office at [phone]. The clinic staff does not handle billing related matters.    If you are scheduled to have a follow up appointment, you will receive a reminder call the day before your visit. On the appointment day please arrive 15 minutes prior to your appointment time. If you are unable to keep that appointment, please call the clinic to cancel or reschedule. If you are more than 10 minutes late or greater for your appointment, the clinic policy is that you may be asked to reschedule.

## 2023-01-25 ENCOUNTER — MEDICAL CORRESPONDENCE (OUTPATIENT)
Dept: HEALTH INFORMATION MANAGEMENT | Facility: CLINIC | Age: 78
End: 2023-01-25
Payer: COMMERCIAL

## 2023-01-31 ENCOUNTER — TELEPHONE (OUTPATIENT)
Dept: WOUND CARE | Facility: CLINIC | Age: 78
End: 2023-01-31
Payer: COMMERCIAL

## 2023-01-31 DIAGNOSIS — L97.912 ULCER OF RIGHT LOWER EXTREMITY WITH FAT LAYER EXPOSED (H): Primary | ICD-10-CM

## 2023-01-31 NOTE — TELEPHONE ENCOUNTER
Patient called saying she has received the Endoform but has not yet received the the gauze and vashe. She was also wondering about some lymphedema wear that she thought was going to be ordered but wasn't sure. Please call with any updated info     [phone]

## 2023-01-31 NOTE — TELEPHONE ENCOUNTER
Returned call to patient. She states she has not received the VASHE or roll gauze and is in need of ABD pads as well. The roll gauze and ABD pads were not included on the last order so a new order is placed for those but the VASHE was on the last order. VASHE included on the order placed today and [employer] rep contacted regarding why VASHE was not sent. The lymphedema wear is the [employer] sleeve the patient is coming to the clinic for 2/7/23. The patient verbalized understanding. No further questions or concerns.

## 2023-02-01 ENCOUNTER — MEDICAL CORRESPONDENCE (OUTPATIENT)
Dept: HEALTH INFORMATION MANAGEMENT | Facility: CLINIC | Age: 78
End: 2023-02-01

## 2023-02-06 ENCOUNTER — TELEPHONE (OUTPATIENT)
Dept: WOUND CARE | Facility: CLINIC | Age: 78
End: 2023-02-06
Payer: COMMERCIAL

## 2023-02-06 NOTE — TELEPHONE ENCOUNTER
Patient called stating she has been sick the past three days with diarrhea and is now feverish today. She tested for Covid yesterday 2/5/23 but it was negative. She is aware that there is a [employer] rep coming to her appointment tomorrow and is asking if it's imperative that she keep the appointment despite how she's feeling. If so, she sounds willing to try her best to be at the [facility] tomorrow even if she feels unwell. Please call her to discuss.

## 2023-02-06 NOTE — TELEPHONE ENCOUNTER
Returned call to patient. She would like to cancel her appointment as she does not feel she can come in. The diarrhea prevents her from leaving the house. The patient is added to the next round of the [facility] rep coming. The patient will call next week when she is feeling better to schedule another appointment before her 3/7 appointment.

## 2023-02-10 ENCOUNTER — LAB REQUISITION (OUTPATIENT)
Dept: LAB | Facility: CLINIC | Age: 78
End: 2023-02-10
Payer: COMMERCIAL

## 2023-02-10 DIAGNOSIS — E78.2 MIXED HYPERLIPIDEMIA: ICD-10-CM

## 2023-02-10 LAB
ALBUMIN SERPL BCG-MCNC: 4.3 G/DL (ref 3.5–5.2)
ALP SERPL-CCNC: 132 U/L (ref 35–104)
ALT SERPL W P-5'-P-CCNC: 24 U/L (ref 10–35)
ANION GAP SERPL CALCULATED.3IONS-SCNC: 12 MMOL/L (ref 7–15)
AST SERPL W P-5'-P-CCNC: 18 U/L (ref 10–35)
BILIRUB SERPL-MCNC: 0.3 MG/DL
BUN SERPL-MCNC: 21.9 MG/DL (ref 8–23)
CALCIUM SERPL-MCNC: 9.7 MG/DL (ref 8.8–10.2)
CHLORIDE SERPL-SCNC: 106 MMOL/L (ref 98–107)
CHOLEST SERPL-MCNC: 185 MG/DL
CREAT SERPL-MCNC: 0.89 MG/DL (ref 0.51–0.95)
DEPRECATED HCO3 PLAS-SCNC: 26 MMOL/L (ref 22–29)
GFR SERPL CREATININE-BSD FRML MDRD: 67 ML/MIN/1.73M2
GLUCOSE SERPL-MCNC: 76 MG/DL (ref 70–99)
HDLC SERPL-MCNC: 77 MG/DL
LDLC SERPL CALC-MCNC: 80 MG/DL
NONHDLC SERPL-MCNC: 108 MG/DL
POTASSIUM SERPL-SCNC: 4.2 MMOL/L (ref 3.4–5.3)
PROT SERPL-MCNC: 7 G/DL (ref 6.4–8.3)
SODIUM SERPL-SCNC: 144 MMOL/L (ref 136–145)
TRIGL SERPL-MCNC: 139 MG/DL

## 2023-02-10 PROCEDURE — 82374 ASSAY BLOOD CARBON DIOXIDE: CPT | Performed by: FAMILY MEDICINE

## 2023-02-10 PROCEDURE — 82310 ASSAY OF CALCIUM: CPT | Performed by: FAMILY MEDICINE

## 2023-02-10 PROCEDURE — 80061 LIPID PANEL: CPT | Mod: ORL | Performed by: FAMILY MEDICINE

## 2023-03-06 ENCOUNTER — MEDICAL CORRESPONDENCE (OUTPATIENT)
Dept: HEALTH INFORMATION MANAGEMENT | Facility: CLINIC | Age: 78
End: 2023-03-06
Payer: COMMERCIAL

## 2023-03-07 ENCOUNTER — HOSPITAL ENCOUNTER (OUTPATIENT)
Dept: WOUND CARE | Facility: CLINIC | Age: 78
Discharge: HOME | End: 2023-03-07
Attending: SURGERY
Payer: COMMERCIAL

## 2023-03-07 DIAGNOSIS — L97.912 ULCER OF RIGHT LOWER EXTREMITY WITH FAT LAYER EXPOSED (H): ICD-10-CM

## 2023-03-07 DIAGNOSIS — I87.303 VENOUS HYPERTENSION OF LOWER EXTREMITY, BILATERAL: Primary | ICD-10-CM

## 2023-03-07 PROCEDURE — 99213 OFFICE O/P EST LOW 20 MIN: CPT | Mod: 95 | Performed by: SURGERY

## 2023-03-07 RX ORDER — CIPROFLOXACIN 500 MG/1
500 TABLET, FILM COATED ORAL 2 TIMES DAILY
Qty: 28 TABLET | Refills: 0 | Status: SHIPPED | OUTPATIENT
Start: 2023-03-07 | End: 2023-03-07

## 2023-03-07 RX ORDER — DOXYCYCLINE 100 MG/1
100 CAPSULE ORAL 2 TIMES DAILY
Qty: 60 CAPSULE | Refills: 0 | Status: SHIPPED | OUTPATIENT
Start: 2023-03-07 | End: 2023-04-06

## 2023-03-07 NOTE — DISCHARGE INSTRUCTIONS
"Elizabeth JODY Kelley      1945    A DME order for supplies has been placed to [employer]. If there are any issues with your order including not receiving the order please call [employer] at [phone]. They can also provide a tracking number for you.    STOP Lymphedema pump until we see you next week.  Consider [employer] ambulatory lymphedema pump for future.    Medications/supplements to aid in healing, continue with:  -Vitamin D3 5,000 iu per day  -Kaylie C 1,000 mg take daily  -Vitamin B Complex with folic acid take 1 tablet daily  -Vitamin B 12 1000 mcg daily  -Lymphatic Formula 1000. Take (1) capsule once daily. Order from www.[employer] or [employer] or call [phone]. (The 1000mg is two (2) 500mg capsules. We are directing you to take one (1) capsule a day)  -One packet of Aleksandr Supplement into your favorite beverage twice a day     Wound Care recommendations to Right Anterior Lower Leg, right posterior lower leg and right lateral lower leg wounds:  -Wash your hands with soap and water before you begin your dressing change and prepare a clean surface for dressings  -Cleanse with mild unscented soap (such as Cetaphil, Cerave or Dove) and water  -Apply 1 betadine-moistened kerlix roll  -Apply 8x10\" ABD pad to right anterior lower leg and 5x9\" ABD pad to right posterior lower leg and right lateral lower leg wounds  -Apply 1 roll of dry kerlix roll & secure with medipore tape  -Apply velcro compression, lightly secured  Change daily    If increase in redness, pain, green/malodorous drainage, fever, chills or malaise, go to ED    Compression:   Your compression is  velcro compression  and can be removed at night and put back on first thing in the morning.   Please remove compression dressing if toes turn blue and/or tingle and can not be relieved by raising the leg for one hour. If unable to reapply in the morning, keep compression on until next dressing change.    Walk as much as you can, as you are " able. Whenever you sit raise your ankle above your hips to promote wound healing.       DILLON Bullock M.D. March 7, 2023    Call us at [phone] if you have any questions about your wounds, have redness or swelling around your wound, have a fever of 101 or greater or if you have any other problems or concerns. We answer the phone Monday through Friday 8 am to 4 pm, please leave a message as we check the voicemail frequently throughout the day.     If you had a positive experience please indicate that on your patient satisfaction survey form that [facility] will be sending you.    It was a pleasure meeting with you today.  Thank you for allowing me and my team the privilege of caring for you today.  YOU are the reason we are here, and I truly hope we provided you with the excellent service you deserve.  Please let us know if there is anything else we can do for you so that we can be sure you are leaving completely satisfied with your care experience.      If you have any billing related questions please call the [facility] Business office at [phone]. The clinic staff does not handle billing related matters.    If you are scheduled to have a follow up appointment, you will receive a reminder call the day before your visit. On the appointment day please arrive 15 minutes prior to your appointment time. If you are unable to keep that appointment, please call the clinic to cancel or reschedule. If you are more than 10 minutes late or greater for your appointment, the clinic policy is that you may be asked to reschedule.

## 2023-03-07 NOTE — PROGRESS NOTES
Phone-Visit Details    Type of service:  Phone Visit    Length of call: 15 minutes    Originating Location (pt. Location): Home    Distant Location (provider location):  [facility] [place]     Mode of Communication:  Telephone    Provider has received verbal consent for a Telephone Visit from the patient? Yes  [facility] [place] Progress Note    Subject: Elizabeth Kelley chronic lymphedema, has had some significant social stressors, now with progressive cellulitis of the lower extremity, itching, lymphorrhea and moderate drainage from the wounds.  She does have a lymphedema pump, we are advising not utilize at this point given the noted pictures and cellulitis and will initiate antibiotics that we have discussed hospitalization for initiation of IV antibiotics, she would like to defer and try the oral antibiotics initially.    Patient Active Problem List   Diagnosis     Venous hypertension of lower extremity, bilateral     Acquired lymphedema of leg     Scar condition and fibrosis of skin     Ankle contracture, left     Morbid obesity with BMI of 50.0-59.9, adult (H)     Valgus deformity of both feet     Flat feet, bilateral     Gait abnormality     MS (multiple sclerosis) (H)     Depression     Vitamin D deficiency     GERD (gastroesophageal reflux disease)     Essential hypertension     History of malignant melanoma of skin     Edema     Major depression, single episode, in complete remission (H)     Menopausal and postmenopausal disorder     Mixed hyperlipidemia     Morbid obesity (H)     Peripheral venous insufficiency     Postmenopausal     Restless legs     Cellulitis of right lower extremity     Chronic pain     Ulcer of right lower extremity with fat layer exposed (H)     Pain associated with wound     Impaired glucose tolerance     Injury, unspecified, initial encounter     Iron deficiency anemia     Snoring     Past Medical History:   Diagnosis Date     Ankle contracture,  left      Class 3 severe obesity due to excess calories without serious comorbidity with body mass index (BMI) of 45.0 to 49.9 in adult (H)      Combined gastric and duodenal ulcer      Controlled substance agreement broken      Depression      Dyslipidemia      Edema      Edema      Gait abnormality      GERD (gastroesophageal reflux disease)      Gout      Lymphedema of both lower extremities      Melanoma (H)     left upper arm     MS (multiple sclerosis) (H)      RLS (restless legs syndrome)      Skin ulcer of left lower leg (H)      Valgus deformity of both feet      Vitamin D deficiency      Exam:  There were no vitals taken for this visit.  Wound Leg Other (comment);Ulceration (Active)       Wound (used by [facility] only) 03/10/22 0900 Right lower;anterior leg ulceration, venous (Active)   Thickness/Stage partial thickness 03/07/23 0700   Base red 03/07/23 0700   Periwound intact;swelling;redness 03/07/23 0700   Periwound Temperature warm 03/07/23 0700   Periwound Skin Turgor soft 03/07/23 0700   Edges open 03/07/23 0700   Length (cm) 8 12/22/22 1242   Width (cm) 20 12/22/22 1242   Depth (cm) 0.1 12/22/22 1242   Wound (cm^2) 160 cm^2 12/22/22 1242   Wound Volume (cm^3) 16 cm^3 12/22/22 1242   Wound healing % 80.95 12/22/22 1242   Drainage Characteristics/Odor serosanguineous 03/07/23 0700   Drainage Amount moderate 03/07/23 0700   Care, Wound non-select wound debridement performed 12/22/22 1242       Wound (used by [facility] only) 03/10/22 0931 Right lower;lateral leg (Active)   Thickness/Stage other (see comments) 11/10/22 1329   Base granulating 09/07/22 1513   Periwound intact;redness;swelling 09/07/22 1513   Periwound Temperature warm 09/07/22 1513   Periwound Skin Turgor soft 09/07/22 1513   Edges open 09/07/22 1513   Length (cm) 3 09/07/22 1513   Width (cm) 3 09/07/22 1513   Depth (cm) 0.1 09/07/22 1513   Wound (cm^2) 9 cm^2 09/07/22 1513   Wound Volume (cm^3) 0.9 cm^3 09/07/22 1513   Wound healing % 64  09/07/22 1513   Drainage Characteristics/Odor serosanguineous;serous 09/07/22 1513   Drainage Amount moderate 09/07/22 1513   Care, Wound non-select wound debridement performed 09/07/22 1513       Wound (used by [facility] only) 03/10/22 0939 Right posterior;lower leg (Active)   Thickness/Stage other (see comments) 11/10/22 1329   Base granulating 09/07/22 1513   Periwound intact;redness;swelling 09/07/22 1513   Periwound Temperature warm 09/07/22 1513   Periwound Skin Turgor soft 09/07/22 1513   Edges open 09/07/22 1513   Length (cm) 1.3 09/07/22 1513   Width (cm) 1.3 09/07/22 1513   Depth (cm) 0.1 09/07/22 1513   Wound (cm^2) 1.69 cm^2 09/07/22 1513   Wound Volume (cm^3) 0.17 cm^3 09/07/22 1513   Wound healing % 89.44 09/07/22 1513   Drainage Characteristics/Odor serous 09/07/22 1513   Drainage Amount copious;small 09/07/22 1513   Care, Wound non-select wound debridement performed 09/07/22 1513       Wound (used by [facility] only) 01/23/23 1550 Right leg (Active)       VASC Wound right lower leg (Active)       Incision/Surgical Site 03/21/22 Right;Lower Leg (Active)     Photographs reviewed, consistent with cellulitis, chronic lymphedema, chronic ulcerations      Impression: Chronic lymphedema, ulcerations, cellulitis    Plan: Initiate oral antibiotics, doxycycline 100 mg twice daily for 1 month, Augmentin 875 mg p.o. twice daily for 2 weeks, Betadine moistened Kerlix wraps around the areas of cellulitis, change on a daily basis, could cover with a chucks, will hold on lymphedema pump utilization, if worsens would need evaluation either at [facility] or her local hospital for admission and IV antibiotics, we will have her follow-up with us in clinic next week for evaluation.      Further instructions from your care team       Elizabeth Kelley      1945    A DME order for supplies has been placed to [facility] [employer]. If there are any issues with your order including not receiving the order please call [employer] at  "[phone]. They can also provide a tracking number for you.    STOP Lymphedema pump until we see you next week.  Consider [employer] ambulatory lymphedema pump for future.    Medications/supplements to aid in healing, continue with:  -Vitamin D3 5,000 iu per day  -Kaylie C 1,000 mg take daily  -Vitamin B Complex with folic acid take 1 tablet daily  -Vitamin B 12 1000 mcg daily  -Lymphatic Formula 1000. Take (1) capsule once daily. Order from www.[employer] or [employer] or call [phone]. (The 1000mg is two (2) 500mg capsules. We are directing you to take one (1) capsule a day)  -One packet of Aleksandr Supplement into your favorite beverage twice a day     Wound Care recommendations to Right Anterior Lower Leg, right posterior lower leg and right lateral lower leg wounds:  -Wash your hands with soap and water before you begin your dressing change and prepare a clean surface for dressings  -Cleanse with mild unscented soap (such as Cetaphil, Cerave or Dove) and water  -Apply 1 betadine-moistened kerlix roll  -Apply 8x10\" ABD pad to right anterior lower leg and 5x9\" ABD pad to right posterior lower leg and right lateral lower leg wounds  -Apply 1 roll of dry kerlix roll & secure with medipore tape  -Apply velcro compression, lightly secured  Change daily    If increase in redness, pain, green/malodorous drainage, fever, chills or malaise, go to ED    Compression:   Your compression is  velcro compression  and can be removed at night and put back on first thing in the morning.   Please remove compression dressing if toes turn blue and/or tingle and can not be relieved by raising the leg for one hour. If unable to reapply in the morning, keep compression on until next dressing change.    Walk as much as you can, as you are able. Whenever you sit raise your ankle above your hips to promote wound healing.       DILLON Bullock M.D. March 7, 2023    Call us at [phone] if you have any questions about your wounds, " have redness or swelling around your wound, have a fever of 101 or greater or if you have any other problems or concerns. We answer the phone Monday through Friday 8 am to 4 pm, please leave a message as we check the voicemail frequently throughout the day.     If you had a positive experience please indicate that on your patient satisfaction survey form that [facility] will be sending you.    It was a pleasure meeting with you today.  Thank you for allowing me and my team the privilege of caring for you today.  YOU are the reason we are here, and I truly hope we provided you with the excellent service you deserve.  Please let us know if there is anything else we can do for you so that we can be sure you are leaving completely satisfied with your care experience.      If you have any billing related questions please call the [facility] Business office at [phone]. The clinic staff does not handle billing related matters.    If you are scheduled to have a follow up appointment, you will receive a reminder call the day before your visit. On the appointment day please arrive 15 minutes prior to your appointment time. If you are unable to keep that appointment, please call the clinic to cancel or reschedule. If you are more than 10 minutes late or greater for your appointment, the clinic policy is that you may be asked to reschedule.         Gabriele Bullock MD on 3/7/2023 at 7:49 AM              Dictated using Dragon voice recognition software which may result in transcription errors

## 2023-03-16 ENCOUNTER — HOSPITAL ENCOUNTER (OUTPATIENT)
Dept: WOUND CARE | Facility: CLINIC | Age: 78
Discharge: HOME | End: 2023-03-16
Attending: SURGERY | Admitting: SURGERY
Payer: COMMERCIAL

## 2023-03-16 VITALS — HEART RATE: 78 BPM | TEMPERATURE: 97.2 F | SYSTOLIC BLOOD PRESSURE: 166 MMHG | DIASTOLIC BLOOD PRESSURE: 80 MMHG

## 2023-03-16 DIAGNOSIS — L97.912 ULCER OF RIGHT LOWER EXTREMITY WITH FAT LAYER EXPOSED (H): ICD-10-CM

## 2023-03-16 DIAGNOSIS — L97.912 ULCER OF RIGHT LOWER EXTREMITY WITH FAT LAYER EXPOSED (H): Primary | ICD-10-CM

## 2023-03-16 DIAGNOSIS — I87.303 VENOUS HYPERTENSION OF LOWER EXTREMITY, BILATERAL: ICD-10-CM

## 2023-03-16 DIAGNOSIS — I89.0 ACQUIRED LYMPHEDEMA OF LEG: ICD-10-CM

## 2023-03-16 PROCEDURE — 97602 WOUND(S) CARE NON-SELECTIVE: CPT

## 2023-03-16 PROCEDURE — 99214 OFFICE O/P EST MOD 30 MIN: CPT | Performed by: SURGERY

## 2023-03-16 RX ORDER — METRONIDAZOLE 500 MG/1
TABLET ORAL
Qty: 90 TABLET | Refills: 0 | Status: SHIPPED | OUTPATIENT
Start: 2023-03-16 | End: 2023-03-17

## 2023-03-16 RX ORDER — AMMONIUM LACTATE 12 G/100G
CREAM TOPICAL DAILY
Qty: 385 G | Refills: 1 | Status: SHIPPED | OUTPATIENT
Start: 2023-03-16 | End: 2024-07-16

## 2023-03-16 NOTE — PROGRESS NOTES
[facility] [place] Progress Note    Subject: Elizabeth Kelley chronic bilateral extremity lymphedema, cellulitis right lower extremity, improved with oral antibiotics and topical application of Betadine, will transition back to topical Vashe hypochlorous acid application, topical application of Flagyl powder, EdemaWear directly to wound, to stimulate dermal lymphatic function decrease subcutaneous edema, she has a Velcro and elastic wrap, Flexitouch lymphedema pump that she utilizes on a regular basis.  Will obtain a consult from vascular medicine for long-term maintenance of lymphedema.  E consult with vascular medicine will be the same day as follow-up with me given driving distance to the [facility].  She denies fevers chills sweats.  She is performing her own dressing changes.  She is not on amlodipine or Norvasc.  She is on chronic Lasix, would suggest a trial of 9 Lasix utilization, measure her weight daily, discussed with primary care physician as the indication for the Lasix according to patient is for lower extremity edema control which may actually chronically make the situation worse as this pulse fluid only had subcutaneous tissues but does not change the inflammatory protein component which cannot be mobilized in the setting of chronic Lasix utilization.  She additionally is on supplemental potassium for the Lasix use.    Patient Active Problem List   Diagnosis     Venous hypertension of lower extremity, bilateral     Acquired lymphedema of leg     Scar condition and fibrosis of skin     Ankle contracture, left     Morbid obesity with BMI of 50.0-59.9, adult (H)     Valgus deformity of both feet     Flat feet, bilateral     Gait abnormality     MS (multiple sclerosis) (H)     Depression     Vitamin D deficiency     GERD (gastroesophageal reflux disease)     Essential hypertension     History of malignant melanoma of skin     Edema     Major depression, single episode, in complete  remission (H)     Menopausal and postmenopausal disorder     Mixed hyperlipidemia     Morbid obesity (H)     Peripheral venous insufficiency     Postmenopausal     Restless legs     Cellulitis of right lower extremity     Chronic pain     Ulcer of right lower extremity with fat layer exposed (H)     Pain associated with wound     Impaired glucose tolerance     Injury, unspecified, initial encounter     Iron deficiency anemia     Snoring     Past Medical History:   Diagnosis Date     Ankle contracture, left      Class 3 severe obesity due to excess calories without serious comorbidity with body mass index (BMI) of 45.0 to 49.9 in adult (H)      Combined gastric and duodenal ulcer      Controlled substance agreement broken      Depression      Dyslipidemia      Edema      Edema      Gait abnormality      GERD (gastroesophageal reflux disease)      Gout      Lymphedema of both lower extremities      Melanoma (H)     left upper arm     MS (multiple sclerosis) (H)      RLS (restless legs syndrome)      Skin ulcer of left lower leg (H)      Valgus deformity of both feet      Vitamin D deficiency      Exam:  BP (!) 166/80 (BP Location: Left arm, Patient Position: Sitting)   Pulse 78   Temp 97.2  F (36.2  C) (Temporal)   Wound Leg Other (comment);Ulceration (Active)       Wound (used by [facility] only) 03/10/22 0900 Right lower;anterior leg ulceration, venous (Active)   Thickness/Stage full thickness 03/16/23 1449   Base red 03/16/23 1449   Periwound swelling;redness 03/16/23 1449   Periwound Temperature warm 03/16/23 1449   Periwound Skin Turgor soft 03/16/23 1449   Edges open 03/16/23 1449   Length (cm) 5.5 03/16/23 1449   Width (cm) 4.9 03/16/23 1449   Depth (cm) 0.1 03/16/23 1449   Wound (cm^2) 26.95 cm^2 03/16/23 1449   Wound Volume (cm^3) 2.7 cm^3 03/16/23 1449   Wound healing % 96.79 03/16/23 1449   Drainage Characteristics/Odor serosanguineous 03/16/23 1449   Drainage Amount large 03/16/23 1449   Care, Wound  non-select wound debridement performed 03/16/23 1449       Wound (used by [facility] only) 01/23/23 1550 Right leg (Active)       VASC Wound right lower leg (Active)       Incision/Surgical Site 03/21/22 Right;Lower Leg (Active)     Chronic bilateral extremity lymphedema, resolving cellulitis right lower extremity, ulceration, biofilm, lymphedema of the thighs.  Elevated BMI.  Keratotic tissue at the medial aspect of both great toes and heels.    Impression: Chronic lymphedema right and left lower extremities, elevated BMI, resolving cellulitis right lower extremity    Plan: Hypochlorous acid Vashe soak to the wound of the right lower extremity, and cover with Flagyl powder, EdemaWear directly to skin, Velcro and elastic, change on a daily basis.  She sleeps in a bed.  She will elevate legs when sitting.  She is utilizing her Flexitouch compression device on a daily basis.  Consult with vascular medicine for long-term lymphedema management.  BMI modification to be beneficial.  She is does not use added salt to her food.  Discussed adequate hydration. Patient will return to the clinic in 4 weeks time      Further instructions from your care team       Elizabeth Kelley      1945        Medications/supplements to aid in healing, continue with:  -Vitamin D3 5,000 iu per day  -Kaylie C 1,000 mg take daily  -Vitamin B Complex with folic acid take 1 tablet daily  -Vitamin B 12 1000 mcg daily  -Lymphatic Formula 1000. Take (1) capsule once daily. Order from  www.[employer] or [employer] or call [phone]. (The 1000mg is two (2)  500mg capsules. We are directing you to take one (1) capsule a day)  -One packet of Aleksandr Supplement into your favorite beverage twice a day    Wound Care recommendations to Right Anterior Lower Leg:  -Wash your hands with soap and water before you begin your dressing change and  prepare a clean surface for dressings  -After cleansing with mild unscented soap (such as Cetaphil, Cerave or Dove)  "and water, Apply small amount of VASHE on gauze, lay into wound bed, let sit for 5-10 minutes, remove gauze (do not rinse) then apply dressing:  -Apply 1 crushed Flagyl tablet to wound bed  -Then apply Orange Stripe EdemaWear from toes to knee and Red Stripe EdemaWear from below knee to thigh. EdemaWear should be worn 24/7 unless bathing/showering or changing the dressing. You will wash and reuse the EdemaWear. DO NOT CUT THE EDEMAWEAR. IF IT IS TOO LONG THEN CUFF THE EDEMAWEAR (the EdemaWear can shrink length wise with washing).  -Then Apply 8x10\" ABD pad   -Apply 1 roll of dry kerlix roll & secure with medipore tape  -Apply velcro compression, lightly secured  Change daily. Ok to change every other day if drainage allows.    Use Am-Lactin to bilateral feet/heels daily and use a pumice stone at least weekly.    If increase in redness, pain, green/malodorous drainage, fever, chills or malaise, go to ED    Compression:  Your compression is velcro compression and can be removed at night and put back on  first thing in the morning.  Please remove compression dressing if toes turn blue and/or tingle and can not be  relieved by raising the leg for one hour. If unable to reapply in the morning, keep  compression on until next dressing change.  Walk as much as you can, as you are able. Whenever you sit raise your ankle  above your hips to promote wound healing.     DILLON Bullock M.D. March 16, 2023    Call us at [phone] if you have any questions about your wounds, have redness or swelling around your wound, have a fever of 101 or greater or if you have any other problems or concerns. We answer the phone Monday through Friday 8 am to 4 pm, please leave a message as we check the voicemail frequently throughout the day.     If you had a positive experience please indicate that on your patient satisfaction survey form that [facility] will be sending you.    It was a pleasure meeting with you today.  Thank you for " allowing me and my team the privilege of caring for you today.  YOU are the reason we are here, and I truly hope we provided you with the excellent service you deserve.  Please let us know if there is anything else we can do for you so that we can be sure you are leaving completely satisfied with your care experience.      If you have any billing related questions please call the [facility] Business office at [phone]. The clinic staff does not handle billing related matters.    If you are scheduled to have a follow up appointment, you will receive a reminder call the day before your visit. On the appointment day please arrive 15 minutes prior to your appointment time. If you are unable to keep that appointment, please call the clinic to cancel or reschedule. If you are more than 10 minutes late or greater for your appointment, the clinic policy is that you may be asked to reschedule.            Gabriele Bullock MD on 3/16/2023 at 3:13 PM      Dictated using Dragon voice recognition software which may result in transcription errors

## 2023-03-16 NOTE — DISCHARGE INSTRUCTIONS
"Elizabeth Kelley      1945    A DME order was not completed because the patient declined the need for supplies    Medications/supplements to aid in healing, continue with:  -Vitamin D3 5,000 iu per day  -Kaylie C 1,000 mg take daily  -Vitamin B Complex with folic acid take 1 tablet daily  -Vitamin B 12 1000 mcg daily  -Lymphatic Formula 1000. Take (1) capsule once daily. Order from  www.[employer] or [employer] or call [phone]. (The 1000mg is two (2)  500mg capsules. We are directing you to take one (1) capsule a day)  -One packet of Aleksandr Supplement into your favorite beverage twice a day    Wound Care recommendations to Right Anterior Lower Leg:  -Wash your hands with soap and water before you begin your dressing change and  prepare a clean surface for dressings  -After cleansing with mild unscented soap (such as Cetaphil, Cerave or Dove) and water, Apply small amount of VASHE on gauze, lay into wound bed, let sit for 5-10 minutes, remove gauze (do not rinse) then apply dressing:  -Apply 1 crushed Flagyl tablet to wound bed  -Then apply Orange Stripe EdemaWear from toes to knee and Red Stripe EdemaWear from below knee to thigh. EdemaWear should be worn 24/7 unless bathing/showering or changing the dressing. You will wash and reuse the EdemaWear. DO NOT CUT THE EDEMAWEAR. IF IT IS TOO LONG THEN CUFF THE EDEMAWEAR (the EdemaWear can shrink length wise with washing).  -Then Apply 8x10\" ABD pad   -Apply 1 roll of dry kerlix roll & secure with medipore tape  -Apply velcro compression, lightly secured  Change daily. Ok to change every other day if drainage allows.    Use Am-Lactin to bilateral feet/heels daily and use a pumice stone at least weekly.    If increase in redness, pain, green/malodorous drainage, fever, chills or malaise, go to ED    Compression:  Your compression is velcro compression and can be removed at night and put back on  first thing in the morning.  Please remove compression dressing if " toes turn blue and/or tingle and can not be  relieved by raising the leg for one hour. If unable to reapply in the morning, keep  compression on until next dressing change.  Walk as much as you can, as you are able. Whenever you sit raise your ankle  above your hips to promote wound healing.     DILLON Bullock M.D. March 16, 2023    Call us at [phone] if you have any questions about your wounds, have redness or swelling around your wound, have a fever of 101 or greater or if you have any other problems or concerns. We answer the phone Monday through Friday 8 am to 4 pm, please leave a message as we check the voicemail frequently throughout the day.     If you had a positive experience please indicate that on your patient satisfaction survey form that [facility] will be sending you.    It was a pleasure meeting with you today.  Thank you for allowing me and my team the privilege of caring for you today.  YOU are the reason we are here, and I truly hope we provided you with the excellent service you deserve.  Please let us know if there is anything else we can do for you so that we can be sure you are leaving completely satisfied with your care experience.      If you have any billing related questions please call the [facility] Business office at [phone]. The clinic staff does not handle billing related matters.    If you are scheduled to have a follow up appointment, you will receive a reminder call the day before your visit. On the appointment day please arrive 15 minutes prior to your appointment time. If you are unable to keep that appointment, please call the clinic to cancel or reschedule. If you are more than 10 minutes late or greater for your appointment, the clinic policy is that you may be asked to reschedule.

## 2023-03-17 ENCOUNTER — TELEPHONE (OUTPATIENT)
Dept: OTHER | Facility: CLINIC | Age: 78
End: 2023-03-17
Payer: COMMERCIAL

## 2023-03-17 RX ORDER — METRONIDAZOLE 500 MG/1
TABLET ORAL
Qty: 90 TABLET | Refills: 0 | Status: SHIPPED | OUTPATIENT
Start: 2023-03-17 | End: 2024-07-16

## 2023-03-17 NOTE — TELEPHONE ENCOUNTER
Pt referred to [facility] by Dr. Bullock for long term lymphedema management.     Pt needs to be scheduled for new pt in clinic consult with vascular medicine.  Will route to scheduling to coordinate an appointment (Please attempt to schedule the same day as the appt with Dr. Bullock 4/5/23).    Appt note: new pt ref by Dr. Bullock for long term lymphedema management.     BRADEN Dubon, RN  [facility] [place]

## 2023-04-03 ENCOUNTER — MEDICAL CORRESPONDENCE (OUTPATIENT)
Dept: HEALTH INFORMATION MANAGEMENT | Facility: CLINIC | Age: 78
End: 2023-04-03
Payer: COMMERCIAL

## 2023-04-03 NOTE — ADDENDUM NOTE
Encounter addended by: Peg Freeman, PT on: 4/3/2023 2:36 PM   Actions taken: Episode resolved, Clinical Note Signed

## 2023-04-05 ENCOUNTER — OFFICE VISIT (OUTPATIENT)
Dept: OTHER | Facility: CLINIC | Age: 78
End: 2023-04-05
Attending: PHYSICIAN ASSISTANT
Payer: COMMERCIAL

## 2023-04-05 ENCOUNTER — HOSPITAL ENCOUNTER (OUTPATIENT)
Dept: WOUND CARE | Facility: CLINIC | Age: 78
Discharge: HOME | End: 2023-04-05
Attending: PHYSICIAN ASSISTANT
Payer: COMMERCIAL

## 2023-04-05 VITALS
HEART RATE: 81 BPM | DIASTOLIC BLOOD PRESSURE: 75 MMHG | HEIGHT: 61 IN | WEIGHT: 258.6 LBS | SYSTOLIC BLOOD PRESSURE: 133 MMHG | BODY MASS INDEX: 48.82 KG/M2 | OXYGEN SATURATION: 98 %

## 2023-04-05 DIAGNOSIS — L97.912 ULCER OF RIGHT LOWER EXTREMITY WITH FAT LAYER EXPOSED (H): ICD-10-CM

## 2023-04-05 DIAGNOSIS — I87.303 VENOUS HYPERTENSION OF LOWER EXTREMITY, BILATERAL: ICD-10-CM

## 2023-04-05 DIAGNOSIS — L97.912 ULCER OF RIGHT LOWER EXTREMITY WITH FAT LAYER EXPOSED (H): Primary | ICD-10-CM

## 2023-04-05 DIAGNOSIS — I89.0 ACQUIRED LYMPHEDEMA OF LEG: ICD-10-CM

## 2023-04-05 PROCEDURE — 11719 TRIM NAIL(S) ANY NUMBER: CPT | Performed by: SURGERY

## 2023-04-05 PROCEDURE — G0463 HOSPITAL OUTPT CLINIC VISIT: HCPCS | Performed by: PHYSICIAN ASSISTANT

## 2023-04-05 PROCEDURE — 99213 OFFICE O/P EST LOW 20 MIN: CPT | Mod: 25 | Performed by: SURGERY

## 2023-04-05 PROCEDURE — 11721 DEBRIDE NAIL 6 OR MORE: CPT | Performed by: SURGERY

## 2023-04-05 PROCEDURE — G0463 HOSPITAL OUTPT CLINIC VISIT: HCPCS

## 2023-04-05 PROCEDURE — 99205 OFFICE O/P NEW HI 60 MIN: CPT | Performed by: PHYSICIAN ASSISTANT

## 2023-04-05 PROCEDURE — 97602 WOUND(S) CARE NON-SELECTIVE: CPT | Mod: XU

## 2023-04-05 RX ORDER — POLYHEXAM BIGUAN/GAUZE BANDAGE 0.2%-2"X2"
BANDAGE TOPICAL
Qty: 100 TABLET | Refills: 2 | OUTPATIENT
Start: 2023-04-05

## 2023-04-05 RX ORDER — AMMONIUM LACTATE 12 G/100G
CREAM TOPICAL DAILY
Qty: 385 G | Refills: 1 | Status: SHIPPED | OUTPATIENT
Start: 2023-04-05 | End: 2024-07-16

## 2023-04-05 NOTE — PROGRESS NOTES
"Patient is here to discuss consult    BP (!) 149/80 (BP Location: Right arm, Patient Position: Chair, Cuff Size: Adult Large)   Pulse 80   Ht 5' 0.5\" (1.537 m)   Wt 258 lb 9.6 oz (117.3 kg)   SpO2 98%   BMI 49.67 kg/m      Questions patient would like addressed today are: N/A.    Refills are needed: No    Has homecare services and agency name:  [facility]    "

## 2023-04-05 NOTE — PROGRESS NOTES
[facility] INITIAL VASCULAR MEDICINE CONSULT      PRIMARY HEALTH CARE PROVIDER:  Francisco Ramirez      REFERRING HEALTH CARE PROVIDER;  Gabriele Bullock      REASON FOR CONSULT:  Establish care for lymphedema.       HPI: Elizabeth Kelley is a very pleasant 77 year old female who has been struggling with lymphedema for over 30 years. She lost a significant amount of weight in the 1980's and underwent surgery in [place] to remove the extra skin on her legs. Unfortunately, during the surgery her lymphatic system was disrupted. She has been battling lymphedema and has been seen by Lymphedema Therapy on many occasions in the past. She has also struggled with recurrent cellulitis and wounds. She has lymphedema pumps at home, but with her MS she finds it hard to get the pumps on herself as the feeling in her hands is not great. Her daughter and son-in-law will go over there when they can to ut her lymphedema pumps on, but they are unable to do this everyday for her. She did have home health care for some time, but they discharged her from it. She then was doing lymphedema therapy out of [place]. This started out great, but then they said they could only see her once a month due to limited availability. Dr. Bullock has been managing her wound and this appears to be improving significantly. She recently decreased her Lasix dosing to 20 mg daily from 40 mg daily.     She is very happy with her current progress. She is on oral antibiotics as well as antibiotics she rubs on her wounds. She is also on many vascular supplements.     They present to our clinic today to establish care and see if there is a way for her to be able to be seen in [place] on a weekly or biweekly basis by Lymphedema Therapy.       PAST MEDICAL HISTORY  Past Medical History:   Diagnosis Date     Ankle contracture, left      Class 3 severe obesity due to excess calories without serious comorbidity with body mass index  (BMI) of 45.0 to 49.9 in adult (H)      Combined gastric and duodenal ulcer      Controlled substance agreement broken      Depression      Dyslipidemia      Edema      Edema      Gait abnormality      GERD (gastroesophageal reflux disease)      Gout      Lymphedema of both lower extremities      Melanoma (H)     left upper arm     MS (multiple sclerosis) (H)      RLS (restless legs syndrome)      Skin ulcer of left lower leg (H)      Valgus deformity of both feet      Vitamin D deficiency        CURRENT MEDICATIONS  ammonium lactate (AMLACTIN) 12 % external cream, Apply topically daily Apply to bilateral feet/heels daily  aspirin (ASPIRIN 81) 81 MG chewable tablet, Take 1 tablet by mouth  aspirin 81 mg chewable tablet, Take 81 mg by mouth every evening   atorvastatin (LIPITOR) 20 MG tablet, Take 20 mg by mouth daily  benzonatate (TESSALON) 100 MG capsule, Take 1 capsule (100 mg) by mouth 3 times daily as needed for cough  Bioflavonoid Products (CINDY-C) 500-550 MG TABS, Take 1 tablet by mouth 2 times daily  buPROPion (WELLBUTRIN SR) 150 MG 12 hr tablet, Take 150 mg by mouth every morning   calcium carbonate (TUMS) 500 MG chewable tablet, Take 1 tablet (500 mg) by mouth daily as needed for heartburn  Carboxymethylcellulose Sodium 0.25 % SOLN, Apply 0.05 mLs to eye  citalopram (CELEXA) 40 MG tablet, Take 40 mg by mouth every morning   [employer] Super B Complex/C TABS, Take 1 tablet by mouth daily  cyanocobalamin (VITAMIN B-12) 1000 MCG tablet, Take 1 tablet (1,000 mcg) by mouth daily  cyclobenzaprine (FLEXERIL) 10 MG tablet, Take 1 tablet (10 mg) by mouth every 8 hours as needed for muscle spasms  doxycycline hyclate (VIBRAMYCIN) 100 MG capsule, Take 1 capsule (100 mg) by mouth 2 times daily for 30 days  ferrous sulfate (FEROSUL) 325 (65 Fe) MG tablet, Take 1 tablet (325 mg) by mouth daily  furosemide (LASIX) 40 MG tablet, Take 1 tablet (40 mg) by mouth daily  gabapentin (NEURONTIN) 100 MG capsule, TAKE 1 CAPSULE BY MOUTH  "3 TIMES A DAY FOR 30 DAYS  gabapentin (NEURONTIN) 300 MG capsule, TAKE 1 CAPSULE BY MOUTH THREE TIMES DAILY  gabapentin (NEURONTIN) 400 MG capsule, Take 1 capsule (400 mg) by mouth 3 times daily  guaiFENesin (ROBITUSSIN) 20 mg/mL SOLN solution, Take 10 mLs by mouth every 4 hours as needed for cough  HYDROmorphone (DILAUDID) 2 MG tablet, Take 1 tablet (2 mg) by mouth every 4 hours as needed for moderate to severe pain  lidocaine (LIDODERM) 5 % patch, 1 PATCH REMOVE AFTER 12 HOURS EXTERNALLY ONCE A DAY  magnesium oxide (MAG-OX) 400 MG tablet, Take 1 tablet by mouth  metroNIDAZOLE (FLAGYL) 500 MG tablet, CRUSH 1 TABLET AND SPRINKLE ONTO THE WOUND EVERY DAY.  multivitamin w/minerals (CENTRUM ADULTS) tablet, Take 1 tablet by mouth daily   nitroGLYcerin (NITROSTAT) 0.4 MG sublingual tablet, PLACE 1 TABLET UNDER TONGUE EVERY 5 MINTUES AS NEEDED FOR CHEST PAIN FOR UP TO 30 DAYS  Nutritional Supplements (VARICOSE VEINS FORMULA OR), Take 1 tablet by mouth every morning  pantoprazole (PROTONIX) 40 MG EC tablet, Take 1 tablet (40 mg) by mouth 2 times daily (before meals)  polyethylene glycol (MIRALAX) 17 GM/Dose powder, 17 g  potassium chloride ER (KLOR-CON M) 20 MEQ CR tablet, Take 20 mEq by mouth every evening (TAKE IN ADDITION TO AM/NOON DOSE FOR TOTAL 30mEq DAILY)  rOPINIRole (REQUIP) 0.5 MG tablet, Take 1 tablet (0.5 mg) by mouth 2 times daily  rOPINIRole (REQUIP) 1 MG tablet, Take 1 mg by mouth At Bedtime (TAKE IN ADDITION TO AM/NOON DOSE FOR TOTAL 2MG DAILY)  senna-docusate (SENNA S) 8.6-50 MG tablet, Take 1 tablet by mouth as needed  simvastatin (ZOCOR) 40 MG tablet, [SIMVASTATIN (ZOCOR) 40 MG TABLET] Take 40 mg by mouth bedtime.  Skin Protectants, Misc. (INTERDRY 10\"X36\") SHEE, Externally apply 7 Units topically once as needed (change daily in groin rash/fold)  spironolactone (ALDACTONE) 25 MG tablet, [SPIRONOLACTONE (ALDACTONE) 25 MG TABLET] Take 25 mg by mouth daily.  triamcinolone (ARISTOCORT HP) 0.5 % external " cream, Apply topically daily Apply to red/irritated skin around wound daily  triamcinolone (KENALOG) 0.1 % external cream, Apply topically 2 times daily Apply to intact skin on right leg  triamcinolone (KENALOG) 0.1 % external ointment, Apply topically every 48 hours To intact skin on right leg, as well as posterior calf on right leg redness  vitamin B-Complex, Take 1 tablet by mouth daily  vitamin D3 (CHOLECALCIFEROL) 250 mcg (92127 units) capsule, Take 1 capsule (250 mcg) by mouth daily  zinc 50 MG TABS, Take 1 tablet by mouth  [DISCONTINUED] pregabalin (LYRICA) 100 MG capsule, Take 100 mg by mouth 2 times daily     No current facility-administered medications on file prior to visit.      PAST SURGICAL HISTORY:  Past Surgical History:   Procedure Laterality Date     ABLATION SAPHENOUS VEIN W/ RFA Right 04/12/2021    Saphenous vein, anterior accessory saphenous vein, sclerotherapy of multiple veins.     COSMETIC SURGERY  1988    reduction of excess skin from weight loss     ESOPHAGOSCOPY, GASTROSCOPY, DUODENOSCOPY (EGD), COMBINED N/A 03/30/2015    Procedure: UPPER ENDOSCOPY with gastric biopsy;  Surgeon: Checo Fletcher MD;  [facility]: [facility];  Service:      IRRIGATION AND DEBRIDEMENT LOWER EXTREMITY, COMBINED Right 3/21/2022    Procedure: RIGHT LEG WOUND DEBRIDEMENT, PAULIE DERMATONE, MISONIX, VAC VERA FLOW WITH VASHE;  Surgeon: Gabriele Bullock MD;  Location:  OR     IRRIGATION AND DEBRIDEMENT LOWER EXTREMITY, COMBINED Right 3/28/2022    Procedure: DEBRIDEMENT AND WOUND DRESSING CHANGE AND MYRIAD APPLICATION OF RIGHT LOWER LEG WOUND;  Surgeon: Gabriele Bullock MD;  Location:  OR     MAMMOPLASTY REDUCTION Bilateral      MOHS MICROGRAPHIC PROCEDURE      left upper arm, melanoma     PICC SINGLE LUMEN PLACEMENT  8/13/2021          PICC SINGLE LUMEN PLACEMENT  9/2/2021          SPINE SURGERY      L5 area surgery, decompression       ALLERGIES     Allergies   Allergen Reactions     Other Environmental  Allergy Anaphylaxis     Bees/Wasps Stings  Bees/Wasps Stings     Adhesive Tape Other (See Comments)     Red,itchy and oozes  Red,itchy and oozes     Adhesive [Mecrylate] Unknown     Other reaction(s): sores     Oxycodone-Acetaminophen Rash     Vicodin [Hydrocodone-Acetaminophen] Nausea and Vomiting and Hives       FAMILY HISTORY  Family History   Problem Relation Age of Onset     Uterine Cancer Mother      Hyperlipidemia Mother      Hypertension Mother      Multiple Sclerosis Mother      Asthma Sister      Obesity Sister      Hyperlipidemia Sister      Hypertension Sister      Multiple Sclerosis Sister      Arthritis Sister      Colon Cancer Paternal Aunt      Breast Cancer Paternal Aunt      Hypertension Paternal Aunt      Hyperlipidemia Paternal Aunt      Brain Cancer Father      Arthritis Maternal Uncle      Arthritis Maternal Grandmother      Early Death Maternal Grandfather      Arthritis Paternal Grandmother      Arthritis Paternal Grandfather        SOCIAL HISTORY  Social History     Socioeconomic History     Marital status: Single     Spouse name: Not on file     Number of children: Not on file     Years of education: Not on file     Highest education level: Not on file   Occupational History     Not on file   Tobacco Use     Smoking status: Former     Packs/day: 0.25     Years: 12.00     Pack years: 3.00     Types: Cigarettes     Quit date: 1975     Years since quittin.3     Smokeless tobacco: Never     Tobacco comments:     quit more than 30 years ago   Vaping Use     Vaping status: Not on file   Substance and Sexual Activity     Alcohol use: Yes     Alcohol/week: 1.0 standard drink of alcohol     Types: 1 Standard drinks or equivalent per week     Comment: glass of wine once a week     Drug use: No     Sexual activity: Yes     Partners: Male     Birth control/protection: Post-menopausal   Other Topics Concern     Parent/sibling w/ CABG, MI or angioplasty before 65F 55M? Not Asked   Social  "History Narrative    .   Has significant other.  2 grown children.  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         ROS:   General: No change in weight, sleep or appetite.  Normal energy.  No fever or chills  Eyes: Negative for vision changes or eye problems  ENT: No problems with ears, nose or throat.  No difficulty swallowing.  Resp: No coughing, wheezing or shortness of breath  CV: No chest pains or palpitations  GI: No nausea, vomiting,  heartburn, abdominal pain, diarrhea, constipation or change in bowel habits  : No urinary frequency or dysuria, bladder or kidney problems  Musculoskeletal: No significant muscle or joint pains  Neurologic: No headaches, +numbness/tingling/weakness from MS  Psychiatric: No problems with anxiety, depression or mental health  Heme/immune/allergy: No history of bleeding or clotting problems or anemia.  No allergies or immune system problems. + lymphedema  Endocrine: No history of thyroid disease, diabetes or other endocrine disorders  Skin: No rashes,worrisome lesions or skin problems other than lymphedema/cellulitis/wounds  Vascular:  No claudication, lifestyle limiting or otherwise; no ischemic rest pain; no non-healing ulcers. No weakness, No loss of sensation       EXAM:  /75 (BP Location: Left arm, Patient Position: Chair, Cuff Size: Adult Large)   Pulse 81   Ht 5' 0.5\" (1.537 m)   Wt 258 lb 9.6 oz (117.3 kg)   SpO2 98%   BMI 49.67 kg/m    In general, the patient is a pleasant female in no apparent distress.    HEENT: NC/AT.  PERRLA.  EOMI.  Sclerae white, not injected.   Heart: RRR. Normal S1, S2 splits physiologically. No murmur, rub, click, or gallop.  Lungs: CTA.  No ronchi, wheezes, rales.    Abdomen: Soft, nontender, nondistended.   Extremities: No clubbing, cyanosis. Bilateral lower extremity lymphedema. Legs wrapped - did not remove as she is going to wound clinic after this appointment.       Labs:  LIPID RESULTS:  Lab Results   Component Value " Date    CHOL 185 02/10/2023    HDL 77 02/10/2023    LDL 80 02/10/2023    TRIG 139 02/10/2023       LIVER ENZYME RESULTS:  Lab Results   Component Value Date    AST 18 02/10/2023    ALT 24 02/10/2023       CBC RESULTS:  Lab Results   Component Value Date    WBC 10.7 04/14/2022    RBC 3.59 (L) 04/14/2022    HGB 9.4 (L) 04/14/2022    HCT 32.2 (L) 04/14/2022    MCV 90 04/14/2022    MCH 26.2 (L) 04/14/2022    MCHC 29.2 (L) 04/14/2022    RDW 16.7 (H) 04/14/2022     (H) 04/14/2022       BMP RESULTS:  Lab Results   Component Value Date     02/10/2023    POTASSIUM 4.2 02/10/2023    POTASSIUM 4.4 06/24/2022    CHLORIDE 106 02/10/2023    CHLORIDE 100 06/24/2022    CO2 26 02/10/2023    CO2 25 06/24/2022    ANIONGAP 12 02/10/2023    ANIONGAP 14 06/24/2022    GLC 76 02/10/2023    GLC 95 06/24/2022    BUN 21.9 02/10/2023    BUN 16 06/24/2022    CR 0.89 02/10/2023    GFRESTIMATED 67 02/10/2023    GFRESTIMATED >60 09/26/2020    GFRESTBLACK >60 09/26/2020    UJNI 9.7 02/10/2023        A1C RESULTS:  Lab Results   Component Value Date    A1C 5.4 04/04/2022       THYROID RESULTS:  Lab Results   Component Value Date    TSH 1.76 04/03/2022       Procedures:   None. Remote venous duplexes reviewed.     Assessment and Plan:   Secondary lymphedema after surgery to reduce extra skin on her legs in 1980's    -Long history of lower extremity lymphedema with recurrent cellulitis and wounds.   -Followed closely in wound clinic by Dr. Bullock presently.   -Wound and cellulitis continue to improve.   -She lives alone but her daughter and son-in-law will go over and help her when they can. As she is unable to get lymphedema pumps on herself, this means she is unable to wear them on a daily basis.   -She no longer qualified for home health in Sept-Oct 2022. Followed at outpatient lymphedema therapy in [place] on routine basis, but then was told they could only see her maybe once a month due to patient log.     Recommendations:   -Continue  current wound cares per Dr. Bullock.   -Continue current medications and supplements.   -Lower BMI as able.  -Will place a referral in to Lymphedema Therapy again and ask that they see her on a weekly or biweekly basis in [place] or surrounding area given her lymphedema and recurrent cellulitis/wound history and to prevent any future complications.   -Will follow-up with her in one month to assess how she is doing and assure she has connected with Lymphedema Therapy.       Pamela Fry PA-C      60 minutes spent on the date of the encounter doing chart review, history and exam, documentation, and further activities as noted above.

## 2023-04-05 NOTE — ADDENDUM NOTE
Encounter addended by: Joan Le RN on: 4/5/2023 4:10 PM   Actions taken: Order list changed, Diagnosis association updated

## 2023-04-05 NOTE — PROGRESS NOTES
[facility] [place] Progress Note    Subject: Elizabeth Kelley chronic bilateral extremity lymphedema, has a fixed MLD like pneumatic lymphedema pump for the past 2 to 3 years is having difficulty using, Velcro and elastic garments are aged and not effective.    Patient Active Problem List   Diagnosis     Venous hypertension of lower extremity, bilateral     Acquired lymphedema of leg     Scar condition and fibrosis of skin     Ankle contracture, left     Morbid obesity with BMI of 50.0-59.9, adult (H)     Valgus deformity of both feet     Flat feet, bilateral     Gait abnormality     MS (multiple sclerosis) (H)     Depression     Vitamin D deficiency     GERD (gastroesophageal reflux disease)     Essential hypertension     History of malignant melanoma of skin     Edema     Major depression, single episode, in complete remission (H)     Menopausal and postmenopausal disorder     Mixed hyperlipidemia     Morbid obesity (H)     Peripheral venous insufficiency     Postmenopausal     Restless legs     Cellulitis of right lower extremity     Chronic pain     Ulcer of right lower extremity with fat layer exposed (H)     Pain associated with wound     Impaired glucose tolerance     Injury, unspecified, initial encounter     Iron deficiency anemia     Snoring     Past Medical History:   Diagnosis Date     Ankle contracture, left      Class 3 severe obesity due to excess calories without serious comorbidity with body mass index (BMI) of 45.0 to 49.9 in adult (H)      Combined gastric and duodenal ulcer      Controlled substance agreement broken      Depression      Dyslipidemia      Edema      Edema      Gait abnormality      GERD (gastroesophageal reflux disease)      Gout      Lymphedema of both lower extremities      Melanoma (H)     left upper arm     MS (multiple sclerosis) (H)      RLS (restless legs syndrome)      Skin ulcer of left lower leg (H)      Valgus deformity of both feet      Vitamin D  deficiency      Exam:  There were no vitals taken for this visit.  Wound Leg Other (comment);Ulceration (Active)       Wound (used by OP [facility] only) 03/10/22 0900 Right lower;anterior leg ulceration, venous (Active)   Thickness/Stage full thickness 04/05/23 1507   Base red 04/05/23 1507   Periwound swelling;redness 04/05/23 1507   Periwound Temperature warm 04/05/23 1507   Periwound Skin Turgor soft 04/05/23 1507   Edges open 04/05/23 1507   Length (cm) 4 04/05/23 1507   Width (cm) 4.5 04/05/23 1507   Depth (cm) 0.1 04/05/23 1507   Wound (cm^2) 18 cm^2 04/05/23 1507   Wound Volume (cm^3) 1.8 cm^3 04/05/23 1507   Wound healing % 97.86 04/05/23 1507   Drainage Characteristics/Odor serosanguineous 04/05/23 1507   Drainage Amount large 04/05/23 1507   Care, Wound non-select wound debridement performed 04/05/23 1507       Wound (used by OP I only) 01/23/23 1550 Right leg (Active)       VASC Wound right lower leg (Active)       Incision/Surgical Site 03/21/22 Right;Lower Leg (Active)     Markedly hypertrophic toenails were trimmed bilaterally and debrided, right and left foot.  Bilateral extremity chronic lymphedema.  Ulceration with biofilm right tibial margin, soaked in hypochlorous acid Vashe and lightly cleansed with gauze.        Impression: Bilateral extremity lymphedema, elevated BMI, lower extremity ulcerations    Plan: Fitting at [facility] for foot and calf and elastic Velcro compression, will utilize the socks that come with the Velcro, utilizes socks over top of the EdemaWear to hold the EdemaWear in place and then placed the foot and calf components.  She would like to return to work on May 1, letter provided, restrictions of elevate legs if legs feel achy for approximately 30 minutes, fall or fatigue.  She would best be served by a standing and sitting frequently during her work shift.  She will obtain new shoe wear based on sizing it would be appropriate to the Velcro compression on her feet.  Lifelong  utilization of diosmin M PFF.  Discussed being evaluated for a ambulatory MLD like nonpneumatic device, cover sheet faxed for fitting on May 2 third or fourth.  Patient will return to the clinic in 6 weeks time    Wound Care recommendations to Right Anterior Lower Leg:  -Wash your hands with soap and water before you begin your dressing change and prepare a clean surface for dressings  -After cleansing with mild unscented soap (such as Cetaphil, Cerave or Dove) and water,  Apply small amount of VASHE on gauze, lay into wound bed, let sit for 15 minutes, remove gauze (do not rinse)   -Apply Am-Lactin to dry skin   -Apply 1 crushed Flagyl tablet to wound bed  -Then apply Orange Stripe EdemaWear from toes to knee and Red Stripe EdemaWear from below knee to thigh bilaterally   -Then apply gauze outside of Edemawear  -Apply 1 roll of dry kerlix roll & secure with medipore tape  -Apply foot and calf velcro compression bilaterally  Change daily. Ok to change every other day if drainage allows.    Further instructions from your care team         Elizabeth Kelley      1945    A DME order was not completed because the patient declined the need for supplies    Your next appointment is a telephone visit. Please text photos of your wounds the day before or the day of the appointment to [phone]. Please include a tape measure or ruler of some sort in your photo for reference.   Please also include your name and date of birth on the text to identify yourself and include the location of the wound(s).  (The cell phone is a secure line and is secure with your information)     Please call [facility] to obtain new velcro compression- (Phone: [phone], Fax: [phone])     Ok to go back to work 5/1 with restrictions (letter provided)    Recommend [facility] ambulatory lymphedema pump: Lillian allen [facility] (Cell: [phone], Fax: [phone]) - you will get a call about this.  Initially wear for 1 hour per day, then 1 week  later increase to 2 hours per day, then 1 week later 3 hours per day. Once healed, use 1 hour per day.    Medications/supplements to aid in healing, continue with:  -Vitamin D3 5,000 iu per day  -Kaylie C 1,000 mg take daily  -Vitamin B Complex with folic acid take 1 tablet daily  -Vitamin B 12 1000 mcg daily  -Lymphatic Formula 1000. Take (1) capsule once daily. Order from www.[employer] or [employer] or call [phone]. (The 1000mg is two (2) 500mg capsules. We are directing you to take one (1) capsule a day)  -One packet of Aleksandr Supplement into your favorite beverage twice a day    Wound Care recommendations to Right Anterior Lower Leg:  -Wash your hands with soap and water before you begin your dressing change and prepare a clean surface for dressings  -After cleansing with mild unscented soap (such as Cetaphil, Cerave or Dove) and water,  Apply small amount of VASHE on gauze, lay into wound bed, let sit for 15 minutes, remove gauze (do not rinse)   -Apply Am-Lactin to dry skin   -Apply 1 crushed Flagyl tablet to wound bed  -Then apply Orange Stripe EdemaWear from toes to knee and Red Stripe EdemaWear from below knee to thigh bilaterally   -Then apply gauze outside of Edemawear  -Apply 1 roll of dry kerlix roll & secure with medipore tape  -Apply foot and calf velcro compression bilaterally  Change daily. Ok to change every other day if drainage allows.    EdemaWear should be worn 24/7 unless bathing/showering or changing the dressing. You will wash and reuse the EdemaWear. DO NOT CUT THE EDEMAWEAR. IF IT IS TOO LONG THEN CUFF THE EDEMAWEAR (the EdemaWear can shrink length wise with washing).     Use Am-Lactin to bilateral feet/heels daily and use a pumice stone at least weekly.     If increase in redness, pain, green/malodorous drainage, fever, chills or malaise, go to ED    Compression:   Your compression is  velcro compression  and can be removed at night and put back on first thing in the morning.    Please remove compression dressing if toes turn blue and/or tingle and can not be relieved by raising the leg for one hour. If unable to reapply in the morning, keep compression on until next dressing change.    Walk as much as you can, as you are able. Whenever you sit raise your ankle above your hips to promote wound healing.       DILLON Bullock M.D. April 5, 2023    ROUTINE FOOT CARE (NAIL TRIMMING / CALLUSES)    Go to afcna.org (American Foot Care Nurses Association) and search for providers near you.  Otherwise, this is a list we have gathered of  recommended locations/providers in [place].    [facility]   [phone]   [facility]  [phone]  [email or web address]   [facility]  [phone]  [place] + 15 mile radius [facility]  [phone]  [email or web address]   [facility]  [address] [place] [address] [phone] Rene Torres DPM  [address] [address] [phone]   [facility]  [phone] [address] [place] [address]  [facility]  Dr. Jensen Mccallum  [phone]  [facility]  [phone]  [place] & [place] Locations  (does not take BCBS) FYI:  *Some providers accept insurance while others are out of pocket. Please contact them for details*        Call us at [phone] if you have any questions about your wounds, have redness or swelling around your wound, have a fever of 101 or greater or if you have any other problems or concerns. We answer the phone Monday through Friday 8 am to 4 pm, please leave a message as we check the voicemail frequently throughout the day.     If you had a positive experience please indicate that on your patient satisfaction survey form that [facility] will be sending you.    It was a pleasure meeting with you today.  Thank you for allowing me and my team the privilege of caring for you today.  YOU are the reason we are here, and I truly  hope we provided you with the excellent service you deserve.  Please let us know if there is anything else we can do for you so that we can be sure you are leaving completely satisfied with your care experience.      If you have any billing related questions please call the [facility] Business office at [phone]. The clinic staff does not handle billing related matters.    If you are scheduled to have a follow up appointment, you will receive a reminder call the day before your visit. On the appointment day please arrive 15 minutes prior to your appointment time. If you are unable to keep that appointment, please call the clinic to cancel or reschedule. If you are more than 10 minutes late or greater for your appointment, the clinic policy is that you may be asked to reschedule.           Gabriele Bullock MD on 4/5/2023 at 4:00 PM      Dictated using Dragon voice recognition software which may result in transcription errors

## 2023-04-05 NOTE — DISCHARGE INSTRUCTIONS
Elizabeth JODY Kelley      1945    A DME order was not completed because the patient declined the need for supplies    Your next appointment is a telephone visit. Please text photos of your wounds the day before or the day of the appointment to [phone]. Please include a tape measure or ruler of some sort in your photo for reference.   Please also include your name and date of birth on the text to identify yourself and include the location of the wound(s).  (The cell phone is a secure line and is secure with your information)     Please call [facility] to obtain new velcro compression- (Phone: [phone], Fax: [phone])     Ok to go back to work 5/1 with restrictions (letter provided)    Recommend [facility] ambulatory lymphedema pump: Lillian Spivey at \[facility]\"" (Cell: [phone], Fax: [phone]) - you will get a call about this.  Initially wear for 1 hour per day, then 1 week later increase to 2 hours per day, then 1 week later 3 hours per day. Once healed, use 1 hour per day.    Medications/supplements to aid in healing, continue with:  -Vitamin D3 5,000 iu per day  -Kaylie C 1,000 mg take daily  -Vitamin B Complex with folic acid take 1 tablet daily  -Vitamin B 12 1000 mcg daily  -Lymphatic Formula 1000. Take (1) capsule once daily. Order from www.[employer] or "[employer] or call [phone]. (The 1000mg is two (2) 500mg capsules. We are directing you to take one (1) capsule a day)  -One packet of Aleksandr Supplement into your favorite beverage twice a day    Wound Care recommendations to Right Anterior Lower Leg:  -Wash your hands with soap and water before you begin your dressing change and prepare a clean surface for dressings  -After cleansing with mild unscented soap (such as Cetaphil, Cerave or Dove) and water,  Apply small amount of VASHE on gauze, lay into wound bed, let sit for 15 minutes, remove gauze (do not rinse)   -Apply Am-Lactin to dry skin   -Apply 1 crushed Flagyl tablet to wound bed  -Then  apply Orange Stripe EdemaWear from toes to knee and Red Stripe EdemaWear from below knee to thigh bilaterally   -Then apply gauze outside of Edemawear  -Apply 1 roll of dry kerlix roll & secure with medipore tape  -Apply foot and calf velcro compression bilaterally  Change daily. Ok to change every other day if drainage allows.    EdemaWear should be worn 24/7 unless bathing/showering or changing the dressing. You will wash and reuse the EdemaWear. DO NOT CUT THE EDEMAWEAR. IF IT IS TOO LONG THEN CUFF THE EDEMAWEAR (the EdemaWear can shrink length wise with washing).     Use Am-Lactin to bilateral feet/heels daily and use a pumice stone at least weekly.     If increase in redness, pain, green/malodorous drainage, fever, chills or malaise, go to ED    Compression:   Your compression is  velcro compression  and can be removed at night and put back on first thing in the morning.   Please remove compression dressing if toes turn blue and/or tingle and can not be relieved by raising the leg for one hour. If unable to reapply in the morning, keep compression on until next dressing change.    Walk as much as you can, as you are able. Whenever you sit raise your ankle above your hips to promote wound healing.       DILLON Bullock M.D. April 5, 2023    ROUTINE FOOT CARE (NAIL TRIMMING / CALLUSES)    Go to afcna.org (American Foot Care Nurses Association) and search for providers near you.  Otherwise, this is a list we have gathered of  recommended locations/providers in [place].    [facility]   [phone]   [facility]  [phone]  [email or web address]   [facility]  [phone]  [place] + 15 mile radius [facility]  [phone]  [email or web address].us   [facility]  [address] [place] [address] [phone] Rene Torres DPM  [address] [place] [address] [phone]   [facility]  [phone] [address] [place] [address]  [facility]  Dr. Styles  [place]less[place]  [phone]  [facility]  [phone]  [place] [facility]  (does not take BCBS) FYI:  *Some providers accept insurance while others are out of pocket. Please contact them for details*        Call us at [phone] if you have any questions about your wounds, have redness or swelling around your wound, have a fever of 101 or greater or if you have any other problems or concerns. We answer the phone Monday through Friday 8 am to 4 pm, please leave a message as we check the voicemail frequently throughout the day.     If you had a positive experience please indicate that on your patient satisfaction survey form that [facility] will be sending you.    It was a pleasure meeting with you today.  Thank you for allowing me and my team the privilege of caring for you today.  YOU are the reason we are here, and I truly hope we provided you with the excellent service you deserve.  Please let us know if there is anything else we can do for you so that we can be sure you are leaving completely satisfied with your care experience.      If you have any billing related questions please call the [facility] Business office at [phone]. The clinic staff does not handle billing related matters.    If you are scheduled to have a follow up appointment, you will receive a reminder call the day before your visit. On the appointment day please arrive 15 minutes prior to your appointment time. If you are unable to keep that appointment, please call the clinic to cancel or reschedule. If you are more than 10 minutes late or greater for your appointment, the clinic policy is that you may be asked to reschedule.

## 2023-05-03 ENCOUNTER — MEDICAL CORRESPONDENCE (OUTPATIENT)
Dept: HEALTH INFORMATION MANAGEMENT | Facility: CLINIC | Age: 78
End: 2023-05-03
Payer: COMMERCIAL

## 2023-05-11 ENCOUNTER — HOSPITAL ENCOUNTER (OUTPATIENT)
Dept: WOUND CARE | Facility: CLINIC | Age: 78
Discharge: HOME | End: 2023-05-11
Attending: SURGERY
Payer: COMMERCIAL

## 2023-05-11 DIAGNOSIS — L97.912 ULCER OF RIGHT LOWER EXTREMITY WITH FAT LAYER EXPOSED (H): Primary | ICD-10-CM

## 2023-05-11 PROCEDURE — 99213 OFFICE O/P EST LOW 20 MIN: CPT | Mod: 95 | Performed by: SURGERY

## 2023-05-11 RX ORDER — TRIAMCINOLONE ACETONIDE 1 MG/G
CREAM TOPICAL 2 TIMES DAILY
Qty: 15 G | Refills: 3 | Status: SHIPPED | OUTPATIENT
Start: 2023-05-11 | End: 2024-07-16

## 2023-05-11 RX ORDER — DOXYCYCLINE 100 MG/1
100 TABLET ORAL 2 TIMES DAILY
Qty: 28 TABLET | Refills: 0 | Status: SHIPPED | OUTPATIENT
Start: 2023-05-11 | End: 2023-05-25

## 2023-05-11 NOTE — PROGRESS NOTES
Phone-Visit Details    Type of service:  Phone Visit    Length of call: 15 minutes    Originating Location (pt. Location): Home    Distant Location (provider location):  [facility] [place]     Mode of Communication:  Telephone    Provider has received verbal consent for a Telephone Visit from the patient? Yes  [facility] [place] Progress Note    Subject: Elizabeth Kelley, telehealth visit, appears to be having a reaction on the right leg, there is a straight circumferential line  At the top of the right calf, may be consistent with a reaction to EdemaWear which we have been hospitalized and a half currently on the skin, allergies chronic, no drug allergies.  We will also have her stop the Flagyl powder the left leg is within the past month.  Certified lymphedema therapist appointments today for confirmed    Recommended  bariatric consult regarding evaluation of a GLP-1 agonist utilization for BMI modification given chronic lower extremity lymphedema with recurrent episodes of ulcerations and cellulitis  Has been fit for a ambulatory nonpneumatic MLD like lymphedema pump, anticipates that this will arrive in the next 3 weeks.      Patient Active Problem List   Diagnosis     Venous hypertension of lower extremity, bilateral     Acquired lymphedema of leg     Scar condition and fibrosis of skin     Ankle contracture, left     Morbid obesity with BMI of 50.0-59.9, adult (H)     Valgus deformity of both feet     Flat feet, bilateral     Gait abnormality     MS (multiple sclerosis) (H)     Depression     Vitamin D deficiency     GERD (gastroesophageal reflux disease)     Essential hypertension     History of malignant melanoma of skin     Edema     Major depression, single episode, in complete remission (H)     Menopausal and postmenopausal disorder     Mixed hyperlipidemia     Morbid obesity (H)     Peripheral venous insufficiency     Postmenopausal     Restless legs     Cellulitis of  right lower extremity     Chronic pain     Ulcer of right lower extremity with fat layer exposed (H)     Pain associated with wound     Impaired glucose tolerance     Injury, unspecified, initial encounter     Iron deficiency anemia     Snoring     Past Medical History:   Diagnosis Date     Ankle contracture, left      Class 3 severe obesity due to excess calories without serious comorbidity with body mass index (BMI) of 45.0 to 49.9 in adult (H)      Combined gastric and duodenal ulcer      Controlled substance agreement broken      Depression      Dyslipidemia      Edema      Edema      Gait abnormality      GERD (gastroesophageal reflux disease)      Gout      Lymphedema of both lower extremities      Melanoma (H)     left upper arm     MS (multiple sclerosis) (H)      RLS (restless legs syndrome)      Skin ulcer of left lower leg (H)      Valgus deformity of both feet      Vitamin D deficiency      Exam:  There were no vitals taken for this visit.  Wound Leg Other (comment);Ulceration (Active)       Wound (used by OP I only) 03/10/22 0900 Right lower;anterior leg ulceration, venous (Active)   Thickness/Stage full thickness 05/11/23 1458   Base red 05/11/23 1458   Periwound swelling;redness 05/11/23 1458   Periwound Temperature warm 05/11/23 1458   Periwound Skin Turgor soft 05/11/23 1458   Edges open 05/11/23 1458   Length (cm) 4 04/05/23 1507   Width (cm) 4.5 04/05/23 1507   Depth (cm) 0.1 04/05/23 1507   Wound (cm^2) 18 cm^2 04/05/23 1507   Wound Volume (cm^3) 1.8 cm^3 04/05/23 1507   Wound healing % 97.86 04/05/23 1507   Drainage Characteristics/Odor serosanguineous 05/11/23 1458   Drainage Amount large 05/11/23 1458   Care, Wound non-select wound debridement performed 04/05/23 1507       Wound (used by OP I only) 01/23/23 1550 Right leg (Active)       VASC Wound right lower leg (Active)       Incision/Surgical Site 03/21/22 Right;Lower Leg (Active)     Telehealth visit, appears to be inflammation,  potential allergic reaction associated EdemaWear potentially, will stop EdemaWear.  We will stop Flagyl powder.          Impression: Chronic lymphedema of the left lower extremities, and current ulcerations, elevated BMI    Plan: Stop EdemaWear, stop Flagyl powder, EdemaWear added to allergy list.  Hypochlorous acid Vashe application, then application of Ioplex to wounds, use sock that was present with Velcro and elastic compression, continue Velcro and elastic compression, certified lymphedema therapy appointments, bariatric consult for consideration of GLP-1 agonist, BMI is complicating management of lower extremity lymphedema, recurrent episodes of cellulitis, lower extremity ulcerations.  Application of triamcinolone cream to skin on a daily basis.  Doxycycline 100 mg twice daily for 2 weeks.  Patient will return to the clinic in 4 weeks time with telehealth visit.          Gabriele Bullock MD on 5/11/2023 at 3:46 PM    Dictated using Dragon voice recognition software which may result in transcription errors

## 2023-05-11 NOTE — DISCHARGE INSTRUCTIONS
"Elizabeth VERA Warren      1945    A DME order for supplies has been placed to [facility]. If there are any issues with your order including not receiving the order please call [facility] at [phone] option 3. They can also provide a tracking number for you if you had supplies shipped to you.    TO DO:   Awaiting [employer] Lymphedema pump to be delivered within 3 weeks   Doxycycline and Triamcinolone cream at your pharmacy   Stop EdemaWear  Consult placed with Bariatric services. They will call you in the next day or two to schedule. If you do not hear from them, call [phone].    Medications/supplements to aid in healing, continue with:  -Vitamin D3 5,000 iu per day  -Kaylie C 1,000 mg take daily  -Vitamin B Complex with folic acid take 1 tablet daily  -Vitamin B 12 1000 mcg daily  -Lymphatic Formula 1000. Take (1) capsule once daily. Order from  www.[employer] or [employer] or call [phone]. (The 1000mg is two (2)  500mg capsules. We are directing you to take one (1) capsule a day)  -One packet of Aleksandr Supplement into your favorite beverage twice a day    Wound Care recommendations to Right Anterior Lower Leg:  -Wash your hands with soap and water before you begin your dressing change and prepare a clean surface for dressings  -After cleansing with mild unscented soap (such as Cetaphil, Cerave or Dove) and water,  -Apply small amount of VASHE on gauze, lay into wound bed, let sit for 15 minutes, remove gauze (do not rinse)  -Apply Triamcinolone Cream to red, irritated, itchy areas of your leg  -Apply 1 4x5\" Ioplex to wound bed  -Apply foot and calf stocking and velcro compression bilaterally  Change every other day if drainage allows; otherwise change daily if soiled      EdemaWear should be worn 24/7 unless bathing/showering or changing the dressing.  You will wash and reuse the EdemaWear. DO NOT CUT THE EDEMAWEAR. IF IT IS TOO  LONG THEN CUFF THE EDEMAWEAR (the " EdemaWear can shrink length wise with  washing).    If increase in redness, pain, green/malodorous drainage, fever, chills or malaise, go to ED    Compression:  Your compression is velcro compression and can be removed at night and put back on  first thing in the morning.  Please remove compression dressing if toes turn blue and/or tingle and can not be  relieved by raising the leg for one hour. If unable to reapply in the morning, keep  compression on until next dressing change.  Walk as much as you can, as you are able. Whenever you sit raise your ankle  above your hips to promote wound healing.     DILLON Bullock M.D. May 11, 2023    Call us at [phone] if you have any questions about your wounds, have redness or swelling around your wound, have a fever of 101 or greater or if you have any other problems or concerns. We answer the phone Monday through Friday 8 am to 4 pm, please leave a message as we check the voicemail frequently throughout the day.     If you had a positive experience please indicate that on your patient satisfaction survey form that [facility] will be sending you.    It was a pleasure meeting with you today.  Thank you for allowing me and my team the privilege of caring for you today.  YOU are the reason we are here, and I truly hope we provided you with the excellent service you deserve.  Please let us know if there is anything else we can do for you so that we can be sure you are leaving completely satisfied with your care experience.      If you have any billing related questions please call the [facility] Business office at [phone]. The clinic staff does not handle billing related matters.    If you are scheduled to have a follow up appointment, you will receive a reminder call the day before your visit. On the appointment day please arrive 15 minutes prior to your appointment time. If you are unable to keep that appointment, please call the clinic to cancel or reschedule. If  you are more than 10 minutes late or greater for your appointment, the clinic policy is that you may be asked to reschedule.

## 2023-05-15 ENCOUNTER — TELEPHONE (OUTPATIENT)
Dept: WOUND CARE | Facility: CLINIC | Age: 78
End: 2023-05-15

## 2023-05-15 NOTE — TELEPHONE ENCOUNTER
Telephone call from patient stating that she is having some stinging right after dressing changes that goes away about 30 minutes after the dressing change. States it is tolerable. Writer reviewed wound care orders and assessed situation. Patient plans to continue with current plan but monitor closely. If pain/stingling worsens in intensity and/or length of time, she will call back to discuss possible change in wound care process.   Avis Oreilly RN

## 2023-06-01 ENCOUNTER — THERAPY VISIT (OUTPATIENT)
Dept: PHYSICAL THERAPY | Facility: REHABILITATION | Age: 78
End: 2023-06-01
Attending: PHYSICIAN ASSISTANT
Payer: COMMERCIAL

## 2023-06-01 ENCOUNTER — HOSPITAL ENCOUNTER (OUTPATIENT)
Dept: WOUND CARE | Facility: CLINIC | Age: 78
Discharge: HOME | End: 2023-06-01
Attending: SURGERY | Admitting: SURGERY
Payer: COMMERCIAL

## 2023-06-01 VITALS — HEART RATE: 74 BPM | SYSTOLIC BLOOD PRESSURE: 167 MMHG | TEMPERATURE: 98.3 F | DIASTOLIC BLOOD PRESSURE: 85 MMHG

## 2023-06-01 DIAGNOSIS — M62.81 GENERALIZED MUSCLE WEAKNESS: ICD-10-CM

## 2023-06-01 DIAGNOSIS — I89.0 ACQUIRED LYMPHEDEMA OF LEG: Primary | ICD-10-CM

## 2023-06-01 DIAGNOSIS — I89.0 ACQUIRED LYMPHEDEMA OF LEG: ICD-10-CM

## 2023-06-01 DIAGNOSIS — R26.81 UNSTEADINESS ON FEET: ICD-10-CM

## 2023-06-01 DIAGNOSIS — L97.912 ULCER OF RIGHT LOWER EXTREMITY WITH FAT LAYER EXPOSED (H): ICD-10-CM

## 2023-06-01 DIAGNOSIS — I83.018 VENOUS STASIS ULCER OF OTHER PART OF RIGHT LOWER LEG LIMITED TO BREAKDOWN OF SKIN WITH VARICOSE VEINS (H): ICD-10-CM

## 2023-06-01 DIAGNOSIS — I87.303 VENOUS HYPERTENSION OF LOWER EXTREMITY, BILATERAL: ICD-10-CM

## 2023-06-01 DIAGNOSIS — L97.811 VENOUS STASIS ULCER OF OTHER PART OF RIGHT LOWER LEG LIMITED TO BREAKDOWN OF SKIN WITH VARICOSE VEINS (H): ICD-10-CM

## 2023-06-01 DIAGNOSIS — I89.0 LYMPHEDEMA OF BOTH LOWER EXTREMITIES: ICD-10-CM

## 2023-06-01 PROCEDURE — 97140 MANUAL THERAPY 1/> REGIONS: CPT | Mod: GP | Performed by: PHYSICAL THERAPIST

## 2023-06-01 PROCEDURE — 97162 PT EVAL MOD COMPLEX 30 MIN: CPT | Mod: GP | Performed by: PHYSICAL THERAPIST

## 2023-06-01 PROCEDURE — 97110 THERAPEUTIC EXERCISES: CPT | Mod: GP | Performed by: PHYSICAL THERAPIST

## 2023-06-01 PROCEDURE — 99213 OFFICE O/P EST LOW 20 MIN: CPT | Performed by: SURGERY

## 2023-06-01 PROCEDURE — 97602 WOUND(S) CARE NON-SELECTIVE: CPT

## 2023-06-01 NOTE — DISCHARGE INSTRUCTIONS
"Elizabeth Kelley      1945    {WAS/WAS NOT:[id number]}     PLAN:   -Start using your new [employer] pump! -- 1hour a day for a week, then 2hour a day for a week, then increase to 3hours a day there after   -Consult placed with Bariatric services. They will call you in the next day or two to schedule. If you do not hear from them, call [phone].     Medications/supplements to aid in healing, continue with:  -Vitamin D3 5,000 iu per day  -Kaylie C 1,000 mg take daily  -Vitamin B Complex with folic acid take 1 tablet daily  -Vitamin B 12 1000 mcg daily  -Lymphatic Formula 1000. Take (1) capsule once daily. Order from  www.[employer] or [employer] or call [phone]. (The 1000mg is two (2)  500mg capsules. We are directing you to take one (1) capsule a day)  -One packet of Aleksandr Supplement into your favorite beverage twice a day     Wound Care recommendations to Right Anterior Lower Leg:  -Wash your hands with soap and water before you begin your dressing change and prepare a clean surface for dressings  -After cleansing with mild unscented soap (such as Cetaphil, Cerave or Dove) and water,  -Apply small amount of VASHE on gauze, lay into wound bed, let sit for 15 minutes, remove gauze (do not rinse)  -Apply Triamcinolone Cream to red, irritated, itchy areas of your leg  -Sprinkle finely crushed flagyl tablets topically onto wound(s). You may purchase a pill  (can be found at a pharmacy or online) or crush by placing in a doubled plastic bag and crush using a rolling pin.   -Apply EdemaWear from toes to knees   -Apply 1/10 of 4x5\" Ioplex over EdemaWear to wound  -Apply foot and calf stocking and velcro compression bilaterally  Change every other day if drainage allows; otherwise change daily if soiled     EdemaWear should be worn 24/7 unless bathing/showering or changing the dressing.  You will wash and reuse the EdemaWear. DO NOT CUT THE EDEMAWEAR. IF IT IS TOO  LONG THEN CUFF THE EDEMAWEAR (the " EdemaWear can shrink length wise with  washing).     If increase in redness, pain, green/malodorous drainage, fever, chills or malaise, go to ED     Compression:  Your compression is velcro compression and can be removed at night and put back on  first thing in the morning.  Please remove compression dressing if toes turn blue and/or tingle and can not be  relieved by raising the leg for one hour. If unable to reapply in the morning, keep  compression on until next dressing change.  Walk as much as you can, as you are able. Whenever you sit raise your ankle  above your hips to promote wound healing.     DILLON Bullock M.D. June 1, 2023    Call us at [phone] if you have any questions about your wounds, have redness or swelling around your wound, have a fever of 101 degrees Fahrenheit or greater or if you have any other problems or concerns. We answer the phone Monday through Friday 8 am to 4 pm, please leave a message as we check the voicemail frequently throughout the day.     If you had a positive experience please indicate that on your patient satisfaction survey form that [facility] will be sending you.    It was a pleasure meeting with you today.  Thank you for allowing me and my team the privilege of caring for you today.  YOU are the reason we are here, and I truly hope we provided you with the excellent service you deserve.  Please let us know if there is anything else we can do for you so that we can be sure you are leaving completely satisfied with your care experience.      If you have any billing related questions please call the [facility] Business office at [phone]. The clinic staff does not handle billing related matters.    If you are scheduled to have a follow up appointment, you will receive a reminder call the day before your visit. On the appointment day please arrive 15 minutes prior to your appointment time. If you are unable to keep that appointment, please call the clinic to  cancel or reschedule. If you are more than 10 minutes late or greater for your scheduled appointment time, the clinic policy is that you may be asked to reschedule.

## 2023-06-01 NOTE — PROGRESS NOTES
[facility] Wound Healing [place] Progress Note    Subject: Elizabeth Kelley initial consultation March 10, 2022, had had ulcerations present 6 months duration at that point, right lower extremity, chronic lymphedema right and left lower extremities, large keloids along the medial aspect of the thighs where she had lobectomies performed in 1989, nondiabetic, no tobacco utilization.  Elevated BMI though has lost weight recently.  Not on amlodipine or Norvasc.  Received her ambulatory nonpneumatic lymphedema pump within the past week, will utilize for an hour a day for a week, then increase to 2 hours daily for the second week, then can increase to 3 hours daily for the third week and will maintain at 3 hours daily subsequent, working with lymphedema therapist, and has follow-up with vascular medicine for long-term maintenance of lymphedema.     Patient Active Problem List   Diagnosis     Venous hypertension of lower extremity, bilateral     Acquired lymphedema of leg     Scar condition and fibrosis of skin     Ankle contracture, left     Morbid obesity with BMI of 50.0-59.9, adult (H)     Valgus deformity of both feet     Flat feet, bilateral     Gait abnormality     MS (multiple sclerosis) (H)     Depression     Vitamin D deficiency     GERD (gastroesophageal reflux disease)     Essential hypertension     History of malignant melanoma of skin     Edema     Major depression, single episode, in complete remission (H)     Menopausal and postmenopausal disorder     Mixed hyperlipidemia     Morbid obesity (H)     Peripheral venous insufficiency     Postmenopausal     Restless legs     Cellulitis of right lower extremity     Chronic pain     Ulcer of right lower extremity with fat layer exposed (H)     Pain associated with wound     Impaired glucose tolerance     Injury, unspecified, initial encounter     Iron deficiency anemia     Snoring     Past Medical History:   Diagnosis Date     Ankle contracture, left      Class 3  severe obesity due to excess calories without serious comorbidity with body mass index (BMI) of 45.0 to 49.9 in adult (H)      Combined gastric and duodenal ulcer      Controlled substance agreement broken      Depression      Dyslipidemia      Edema      Edema      Gait abnormality      GERD (gastroesophageal reflux disease)      Gout      Lymphedema of both lower extremities      Melanoma (H)     left upper arm     MS (multiple sclerosis) (H)      RLS (restless legs syndrome)      Skin ulcer of left lower leg (H)      Valgus deformity of both feet      Vitamin D deficiency      Exam:  BP (!) 167/85 (BP Location: Left arm, Patient Position: Sitting, Cuff Size: Adult Regular)   Pulse 74   Temp 98.3  F (36.8  C) (Temporal)   Wound Leg Other (comment);Ulceration (Active)       Wound (used by OP I only) 03/10/22 0900 Right lower;anterior leg ulceration, venous (Active)   Thickness/Stage full thickness 06/01/23 1057   Base red 06/01/23 1057   Periwound swelling;redness 06/01/23 1057   Periwound Temperature warm 06/01/23 1057   Periwound Skin Turgor soft 06/01/23 1057   Edges open 06/01/23 1057   Length (cm) 2.1 06/01/23 1057   Width (cm) 2.9 06/01/23 1057   Depth (cm) 0.3 06/01/23 1057   Wound (cm^2) 6.09 cm^2 06/01/23 1057   Wound Volume (cm^3) 1.83 cm^3 06/01/23 1057   Wound healing % 99.28 06/01/23 1057   Drainage Characteristics/Odor serosanguineous 06/01/23 1057   Drainage Amount large 06/01/23 1057   Care, Wound non-select wound debridement performed 06/01/23 1057       Wound (used by OP I only) 01/23/23 1550 Right leg (Active)       Wound (used by OP I only) 06/01/23 1101 Right medial;lower leg (Active)   Thickness/Stage full thickness 06/01/23 1057   Base pink;slough 06/01/23 1057   Periwound intact;pink 06/01/23 1057   Periwound Temperature warm 06/01/23 1057   Periwound Skin Turgor soft 06/01/23 1057   Length (cm) 0.7 06/01/23 1057   Width (cm) 0.6 06/01/23 1057   Depth (cm) 0.2 06/01/23 1057   Wound  (cm^2) 0.42 cm^2 06/01/23 1057   Wound Volume (cm^3) 0.08 cm^3 06/01/23 1057   Drainage Characteristics/Odor serosanguineous 06/01/23 1057   Drainage Amount moderate 06/01/23 1057   Care, Wound non-select wound debridement performed 06/01/23 1057       VASC Wound right lower leg (Active)       Incision/Surgical Site 03/21/22 Right;Lower Leg (Active)     2 ulcerations right lower extremity in setting of chronic near circumferential scar tissue, lipodermatosclerosis, extensive previous surgical scar along the medial aspect of the thighs, no right heel ulceration, palpable right dorsalis pedis pulse.  No cellulitis.        Impression: Chronic bilateral extremity lymphedema, history of near circumferential right lower extremity ulceration, history of lobectomies medial thighs, elevated BMI though recent weight modification    Plan: We will dress the wounds with hypochlorous acid Vashe soak, then reinitiate application of Flagyl powder, perform on a daily basis, EdemaWear, she has an elastic Velcro compression, received none pneumatic ambulatory MLD like device within the past week, she will begin utilization for an hour a day for the first week, 2 hours/day for the second week, then 3 hours/day for the third week and continue 3 hours/day, has follow-up with certified lymphedema therapist and follow-up with vascular medicine.  Lifelong utilization of micronized purified flavonoid fraction.  Vitamin D, vitamin C, B vitamins, recently completed 3-week course of vitamin A..  Patient will return to the clinic in 4 weeks time      Further instructions from your care team       Elizabeth Kelley      1945           TO DO:   Start using your new [employer] pump! -- 1hour a day for a week, then 2hour a day for 2 weeks, then increase to 3hours a day there after   Consult placed with Bariatric services. They will call you in the next day or two to schedule. If you do not hear from them, call [phone].    "  Medications/supplements to aid in healing, continue with:  -Vitamin D3 5,000 iu per day  -Kaylie C 1,000 mg take daily  -Vitamin B Complex with folic acid take 1 tablet daily  -Vitamin B 12 1000 mcg daily  -Lymphatic Formula 1000. Take (1) capsule once daily. Order from  www.[employer] or [employer] or call [phone]. (The 1000mg is two (2)  500mg capsules. We are directing you to take one (1) capsule a day)  -One packet of Aleksandr Supplement into your favorite beverage twice a day     Wound Care recommendations to Right Anterior Lower Leg:  -Wash your hands with soap and water before you begin your dressing change and prepare a clean surface for dressings  -After cleansing with mild unscented soap (such as Cetaphil, Cerave or Dove) and water,  -Apply small amount of VASHE on gauze, lay into wound bed, let sit for 15 minutes, remove gauze (do not rinse)  -Apply Triamcinolone Cream to red, irritated, itchy areas of your leg  -Sprinkle finely crushed flagyl tablets topically onto wound(s). You may purchase a pill  (can be found at a pharmacy or online) or crush by placing in a doubled plastic bag and crush using a rolling pin.   -Apply EdemaWear from toes to knees   -Apply 1 4x5\" Ioplex to wound bed  -Apply foot and calf stocking and velcro compression bilaterally  Change every other day if drainage allows; otherwise change daily if soiled     EdemaWear should be worn 24/7 unless bathing/showering or changing the dressing.  You will wash and reuse the EdemaWear. DO NOT CUT THE EDEMAWEAR. IF IT IS TOO  LONG THEN CUFF THE EDEMAWEAR (the EdemaWear can shrink length wise with  washing).     If increase in redness, pain, green/malodorous drainage, fever, chills or malaise, go to ED     Compression:  Your compression is velcro compression and can be removed at night and put back on  first thing in the morning.  Please remove compression dressing if toes turn blue and/or tingle and can not be  relieved by raising " the leg for one hour. If unable to reapply in the morning, keep  compression on until next dressing change.  Walk as much as you can, as you are able. Whenever you sit raise your ankle  above your hips to promote wound healing.     DILLON Bullock M.D. June 1, 2023    Call us at [phone] if you have any questions about your wounds, have redness or swelling around your wound, have a fever of 101 degrees Fahrenheit or greater or if you have any other problems or concerns. We answer the phone Monday through Friday 8 am to 4 pm, please leave a message as we check the voicemail frequently throughout the day.     If you had a positive experience please indicate that on your patient satisfaction survey form that [facility] will be sending you.    It was a pleasure meeting with you today.  Thank you for allowing me and my team the privilege of caring for you today.  YOU are the reason we are here, and I truly hope we provided you with the excellent service you deserve.  Please let us know if there is anything else we can do for you so that we can be sure you are leaving completely satisfied with your care experience.      If you have any billing related questions please call the [facility] Business office at [phone]. The clinic staff does not handle billing related matters.    If you are scheduled to have a follow up appointment, you will receive a reminder call the day before your visit. On the appointment day please arrive 15 minutes prior to your appointment time. If you are unable to keep that appointment, please call the clinic to cancel or reschedule. If you are more than 10 minutes late or greater for your scheduled appointment time, the clinic policy is that you may be asked to reschedule.           Gabriele Bullock MD on 6/1/2023 at 11:24 AM        Dictated using Dragon voice recognition software which may result in transcription errors    Initial consultation March 10, 2022

## 2023-06-01 NOTE — PROGRESS NOTES
PHYSICAL THERAPY     Type of Visit: Evaluation Edema PT      See electronic medical record for Abuse and Falls Screening details.    Subjective      Presenting condition or subjective complaint: lymphedema  Date of onset: 04/05/23    Relevant medical history: High blood pressure; Incontinence; Multiple Sclerosis; Non-healing wounds; Overweight; Significant weakness; Vision problems   Patient Active Problem List   Diagnosis     Venous hypertension of lower extremity, bilateral     Acquired lymphedema of leg     Scar condition and fibrosis of skin     Ankle contracture, left     Morbid obesity with BMI of 50.0-59.9, adult (H)     Valgus deformity of both feet     Flat feet, bilateral     Gait abnormality     MS (multiple sclerosis) (H)     Depression     Vitamin D deficiency     GERD (gastroesophageal reflux disease)     Essential hypertension     History of malignant melanoma of skin     Edema     Major depression, single episode, in complete remission (H)     Menopausal and postmenopausal disorder     Mixed hyperlipidemia     Morbid obesity (H)     Peripheral venous insufficiency     Postmenopausal     Restless legs     Cellulitis of right lower extremity     Chronic pain     Ulcer of right lower extremity with fat layer exposed (H)     Pain associated with wound     Impaired glucose tolerance     Injury, unspecified, initial encounter     Iron deficiency anemia     Snoring     Generalized muscle weakness     Venous stasis ulcer of other part of right lower leg limited to breakdown of skin with varicose veins (H)     Unsteadiness on feet       Dates & types of surgery:      Prior diagnostic imaging/testing results: MRI; CT scan; X-ray     Prior therapy history for the same diagnosis, illness or injury: Yes Lymphedema treatments  Pt repors being in the hospital for 2 months due to wounds in her right leg with several complications. It was about 1 year ago. This past year she has been working with wound care with   [facility] and her wounds are slowly improving. She has new velcro compressions and continues to do wound care independently. She arrived today after a visit with Dr. Bullock. Her goal is to work on MLD and exercise to help her wounds close. She just got a new pump that she can wear while walking.       Living Environment  Social support: With a significant other or spouse   Type of home: House; 2-story; Basement   Stairs to enter the home: Yes 4     Ramp: No   Stairs inside the home: No       Help at home: Self Cares (home health aide/personal care attendant, family, etc)  Equipment owned: Straight Cane; Four-point cane; Walker; Walker with wheels; Grab bars; Commode; Bath bench     Employment: No    Hobbies/Interests: gardening, cooking, coloring floral arrangement    Patient goals for therapy:         Objective      EDEMA EVALUATION  Additional history:  Body part affected by edema: Legs  If cancer related, treatment:    If not cancer related, problems with veins or cause of swelling: per pt, lymph nodes in her legs (inguinal, popliteal fossa) were removed  Distance able to walk: 1 mile  Time able to stand: 25-30 min  Sensation problems in hands/feet: Yes tingling in hands  Edema etiology: Chronic Venous Insufficiency, Infection    Cognitive Status Examination  Orientation: Oriented to person, place and time   Level of Consciousness: Alert      EDEMA  Skin Condition: deferred to next appointment as pt arrived immediately following wound care appointment - preferred not to doff compression and new wound care bandages  Stemmer Sign: +  Ulceration: Yes- wounds will be further evaluated at next follow up    GIRTH MEASUREMENTS: Refer to separate girth measurement flowsheet- deferred to first follow up as pt arrived just after wound care appointment.    RANGE OF MOTION: LE ROM WFL  Shoulder AROM to ~90 deg of flexion and abduction secondary to MS   STRENGTH: 5TSTS: 17 sec with use of manuel UEs, unable to complete without UE  support   POSTURE: forward head and shoulders  BED MOBILITY: Independent  TRANSFERS: Sit to stand: independent with use of manuel UEs  GAIT/LOCOMOTION: slow gait speed  BALANCE: overall impaired balance secondary to MS, weakness- will further evaluate   VASCULAR: Vascular concerns  COORDINATION: WNL  MUSCLE TONE: WNL    Assessment & Plan   CLINICAL IMPRESSIONS   Medical Diagnosis: Venous hypertension of lower extremity, bilateral  Acquired lymphedema of leg    Treatment Diagnosis: Venous hypertension of lower extremity, bilateral  Acquired lymphedema of leg, muscle weakness, unsteadiness on feet   Impression/Assessment: Patient is a 77 year old female with manuel LE swelling and wounds on her right leg.  The following significant findings have been identified: Pain, Decreased strength, Impaired balance, Impaired sensation, Edema, Impaired gait and Decreased activity tolerance. These impairments interfere with their ability to perform self care tasks, work tasks, recreational activities and community mobility as compared to previous level of function.     Clinical Decision Making (Complexity):   Clinical Presentation: Evolving/Changing  Clinical Presentation Rationale: based on medical and personal factors listed in PT evaluation  Clinical Decision Making (Complexity): Moderate complexity    PLAN OF CARE  Treatment Interventions:  Interventions: Gait Training, Manual Therapy, Neuromuscular Re-education, Therapeutic Activity, Therapeutic Exercise, Self-Care/Home Management, Gradient Compression Bandaging    Long Term Goals     PT Goal 1  Goal Identifier: Self-care  Goal Description: Pt will be independent in self-care and home management of condition including skin care, compression wear/care, self MLD, use of compression pump and exercise.  Target Date: 08/29/23  PT Goal 2  Goal Identifier: 5TSTS  Goal Description: Pt will demonstrate a decreased falls risk by decreasing her 5TSTS score to <10 seconds without UE  support  Target Date: 08/29/23      Frequency of Treatment: 1x/week or every other week  Duration of Treatment: 12 weeks    Recommended Referrals to Other Professionals: none     Education Assessment:   Learner/Method: Patient    Risks and benefits of evaluation/treatment have been explained.   Patient/Family/caregiver agrees with Plan of Care.     Evaluation Time:     PT Eval, Moderate Complexity Minutes (63844): 25      Signing Clinician: Cindi Wagoner, PT      [facility]                                                                                   OUTPATIENT PHYSICAL THERAPY      PLAN OF TREATMENT FOR OUTPATIENT REHABILITATION   Patient's Last Name, First Name, Elizabeth Moncada YOB: 1945   Provider's Name   [facility]   Medical Record No.  [id number]     Onset Date: 04/05/23  Start of Care Date: 06/01/23     Medical Diagnosis:  Venous hypertension of lower extremity, bilateral  Acquired lymphedema of leg      PT Treatment Diagnosis:  Venous hypertension of lower extremity, bilateral  Acquired lymphedema of leg, muscle weakness, unsteadiness on feet Plan of Treatment  Frequency/Duration: 1x/week or every other week/ 12 weeks    Certification date from 06/01/23 to 08/29/23         See note for plan of treatment details and functional goals     Cindi Wagoner, PT                         I CERTIFY THE NEED FOR THESE SERVICES FURNISHED UNDER        THIS PLAN OF TREATMENT AND WHILE UNDER MY CARE     (Physician attestation of this document indicates review and certification of the therapy plan).                  Referring Provider:  Pamela Fry      Initial Assessment  See Epic Evaluation- Start of Care Date: 06/01/23

## 2023-06-22 ENCOUNTER — THERAPY VISIT (OUTPATIENT)
Dept: PHYSICAL THERAPY | Facility: REHABILITATION | Age: 78
End: 2023-06-22
Payer: COMMERCIAL

## 2023-06-22 DIAGNOSIS — R26.81 UNSTEADINESS ON FEET: ICD-10-CM

## 2023-06-22 DIAGNOSIS — I87.303 VENOUS HYPERTENSION OF LOWER EXTREMITY, BILATERAL: ICD-10-CM

## 2023-06-22 DIAGNOSIS — M62.81 GENERALIZED MUSCLE WEAKNESS: ICD-10-CM

## 2023-06-22 DIAGNOSIS — L97.811 VENOUS STASIS ULCER OF OTHER PART OF RIGHT LOWER LEG LIMITED TO BREAKDOWN OF SKIN WITH VARICOSE VEINS (H): Primary | ICD-10-CM

## 2023-06-22 DIAGNOSIS — I89.0 ACQUIRED LYMPHEDEMA OF LEG: ICD-10-CM

## 2023-06-22 DIAGNOSIS — I83.018 VENOUS STASIS ULCER OF OTHER PART OF RIGHT LOWER LEG LIMITED TO BREAKDOWN OF SKIN WITH VARICOSE VEINS (H): Primary | ICD-10-CM

## 2023-06-22 PROCEDURE — 97140 MANUAL THERAPY 1/> REGIONS: CPT | Mod: GP | Performed by: PHYSICAL THERAPIST

## 2023-07-07 ENCOUNTER — THERAPY VISIT (OUTPATIENT)
Dept: PHYSICAL THERAPY | Facility: REHABILITATION | Age: 78
End: 2023-07-07
Payer: COMMERCIAL

## 2023-07-07 DIAGNOSIS — I83.018 VENOUS STASIS ULCER OF OTHER PART OF RIGHT LOWER LEG LIMITED TO BREAKDOWN OF SKIN WITH VARICOSE VEINS (H): Primary | ICD-10-CM

## 2023-07-07 DIAGNOSIS — M62.81 GENERALIZED MUSCLE WEAKNESS: ICD-10-CM

## 2023-07-07 DIAGNOSIS — I87.303 VENOUS HYPERTENSION OF LOWER EXTREMITY, BILATERAL: ICD-10-CM

## 2023-07-07 DIAGNOSIS — R26.81 UNSTEADINESS ON FEET: ICD-10-CM

## 2023-07-07 DIAGNOSIS — L97.811 VENOUS STASIS ULCER OF OTHER PART OF RIGHT LOWER LEG LIMITED TO BREAKDOWN OF SKIN WITH VARICOSE VEINS (H): Primary | ICD-10-CM

## 2023-07-07 DIAGNOSIS — I89.0 ACQUIRED LYMPHEDEMA OF LEG: ICD-10-CM

## 2023-07-07 PROCEDURE — 97140 MANUAL THERAPY 1/> REGIONS: CPT | Mod: GP | Performed by: PHYSICAL THERAPIST

## 2023-07-11 ENCOUNTER — HOSPITAL ENCOUNTER (OUTPATIENT)
Dept: WOUND CARE | Facility: CLINIC | Age: 78
Discharge: HOME | End: 2023-07-11
Attending: FAMILY MEDICINE | Admitting: FAMILY MEDICINE
Payer: COMMERCIAL

## 2023-07-11 ENCOUNTER — TELEPHONE (OUTPATIENT)
Dept: WOUND CARE | Facility: CLINIC | Age: 78
End: 2023-07-11

## 2023-07-11 VITALS — SYSTOLIC BLOOD PRESSURE: 169 MMHG | HEART RATE: 84 BPM | TEMPERATURE: 97.8 F | DIASTOLIC BLOOD PRESSURE: 74 MMHG

## 2023-07-11 DIAGNOSIS — I10 ESSENTIAL HYPERTENSION: ICD-10-CM

## 2023-07-11 DIAGNOSIS — I87.2 PERIPHERAL VENOUS INSUFFICIENCY: ICD-10-CM

## 2023-07-11 DIAGNOSIS — I87.303 VENOUS HYPERTENSION OF LOWER EXTREMITY, BILATERAL: ICD-10-CM

## 2023-07-11 DIAGNOSIS — L97.912 ULCER OF RIGHT LOWER EXTREMITY WITH FAT LAYER EXPOSED (H): Primary | ICD-10-CM

## 2023-07-11 PROBLEM — I83.018 VENOUS STASIS ULCER OF OTHER PART OF RIGHT LOWER LEG LIMITED TO BREAKDOWN OF SKIN WITH VARICOSE VEINS (H): Status: RESOLVED | Noted: 2023-06-01 | Resolved: 2023-07-11

## 2023-07-11 PROBLEM — L97.811 VENOUS STASIS ULCER OF OTHER PART OF RIGHT LOWER LEG LIMITED TO BREAKDOWN OF SKIN WITH VARICOSE VEINS (H): Status: RESOLVED | Noted: 2023-06-01 | Resolved: 2023-07-11

## 2023-07-11 PROCEDURE — 99215 OFFICE O/P EST HI 40 MIN: CPT | Mod: 25 | Performed by: FAMILY MEDICINE

## 2023-07-11 PROCEDURE — 11042 DBRDMT SUBQ TIS 1ST 20SQCM/<: CPT | Performed by: FAMILY MEDICINE

## 2023-07-11 RX ORDER — ACETAMINOPHEN 325 MG/1
650 TABLET ORAL EVERY 6 HOURS PRN
Status: ON HOLD | COMMUNITY
End: 2024-07-23

## 2023-07-11 RX ORDER — METRONIDAZOLE 500 MG/1
500 TABLET ORAL EVERY OTHER DAY
Qty: 90 TABLET | Refills: 0 | Status: SHIPPED | OUTPATIENT
Start: 2023-07-11 | End: 2024-07-16

## 2023-07-11 NOTE — ADDENDUM NOTE
Encounter addended by: Lucia Ramirez RN on: 7/11/2023 12:49 PM   Actions taken: Visit diagnoses modified, Order list changed, Diagnosis association updated

## 2023-07-11 NOTE — TELEPHONE ENCOUNTER
Please schedule patient for Right Venous competency      Mobility:    Does the patient use an assistive device? (i.e. walker, wheelchair) Yes    Does the patient need assistance getting on/off the ultrasound table? Yes    Does the patient need assistance undressing/redressing the wounds? Yes         Wound wraps/dressings:    Is a 2 layer wrap being used? No (If yes, vein staff to assist patient in cutting this off.)    Is Edem[place]ear being used? No (If yes, vein team not to cut, but able to remove dressing.)     Will the patient bring dressings to reapply after US? Yes    o   If not, can they be sent home with a chux to have home care or family member redress? Yes    o   Do they need a coordinated appointment for a nurse visit at wound to redress? No    If coordinated appointment required, wound nurse to alert charge nurse or  staff who will call Vein  at [phone] and/or Vascular schedulers [phone] to schedule the testing needed and coordinated wound clinic visit.    The dressing can always be removed if testing is ordered.     Please include in the order to be scheduled by [facility] or [employer], whichever is appropriate as to not have the testing scheduled by central scheduling unless the patient is a TRINA. If the patient is a TRINA the patient will be scheduled at central scheduling to have the testing done at the hospital.    Routing to [employer] Scheduling Pool for Venous Competency     Lars Torres M.D.

## 2023-07-11 NOTE — PROGRESS NOTES
Wound Clinic Note         Visit date: 07/11/2023       Cheif Complaint:     Elizabeth Kelley is a 77 year old   female had concerns including WOUND CARE.  The patient has right leg ulcers.          HISTORY OF PRESENT ILLNESS:    Elizabeth Kelley reports the wound has been present since 2020.  The wound began due to the area being bumped.   She has chronic lymphedema and works with the lymphedema clinic once a week.  The patient denies a history of congestive heart failure, previous vein procedures or previous DVT.       The patient has been bandaging the wound with a Vashe soak followed by Flagyl powder, Ioplex and a Spandage stocking all changed once a day.  There has been Moderate drainage from the wound.       The pateint denies fevers or chills.  They report the pain from the wound has been 0/10 and has remained about the same recently.      The patient reports they currently do not have any routine for elevating their legs.     She does have pneumatic lymphedema pumps which she uses once a day to help control her swelling.    Today the patient reports maintaining a regular diet without special attention to protein.        The patient denies a history of diabetes, smoking or chronic steroid use.         The patient has not had any symptoms of infection relating to the wound recently and is not currently on antibiotics.       Problem List:   Past Medical History:   Diagnosis Date     Ankle contracture, left      Class 3 severe obesity due to excess calories without serious comorbidity with body mass index (BMI) of 45.0 to 49.9 in adult (H)      Combined gastric and duodenal ulcer      Controlled substance agreement broken      Depression      Dyslipidemia      Edema      Edema      Gait abnormality      GERD (gastroesophageal reflux disease)      Gout      Lymphedema of both lower extremities      Melanoma (H)     left upper arm     MS (multiple sclerosis) (H)      RLS (restless legs syndrome)      Skin ulcer  of left lower leg (H)      Valgus deformity of both feet      Vitamin D deficiency              Family Hx: family history includes Arthritis in her maternal grandmother, maternal uncle, paternal grandfather, paternal grandmother, and sister; Asthma in her sister; Brain Cancer in her father; Breast Cancer in her paternal aunt; Colon Cancer in her paternal aunt; Early Death in her maternal grandfather; Hyperlipidemia in her mother, paternal aunt, and sister; Hypertension in her mother, paternal aunt, and sister; Multiple Sclerosis in her mother and sister; Obesity in her sister; Uterine Cancer in her mother.       Surgical Hx:   Past Surgical History:   Procedure Laterality Date     ABLATION SAPHENOUS VEIN W/ RFA Right 04/12/2021    Saphenous vein, anterior accessory saphenous vein, sclerotherapy of multiple veins.     COSMETIC SURGERY  1988    reduction of excess skin from weight loss     ESOPHAGOSCOPY, GASTROSCOPY, DUODENOSCOPY (EGD), COMBINED N/A 03/30/2015    Procedure: UPPER ENDOSCOPY with gastric biopsy;  Surgeon: Checo Fletcher MD;  Location: [facility];  Service:      IRRIGATION AND DEBRIDEMENT LOWER EXTREMITY, COMBINED Right 3/21/2022    Procedure: RIGHT LEG WOUND DEBRIDEMENT, PAULIE DERMATONE, MISONIX, VAC VERA FLOW WITH VASHE;  Surgeon: Gabriele Bullock MD;  Location:  OR     IRRIGATION AND DEBRIDEMENT LOWER EXTREMITY, COMBINED Right 3/28/2022    Procedure: DEBRIDEMENT AND WOUND DRESSING CHANGE AND MYRIAD APPLICATION OF RIGHT LOWER LEG WOUND;  Surgeon: Gabriele Bullock MD;  Location:  OR     MAMMOPLASTY REDUCTION Bilateral      MOHS MICROGRAPHIC PROCEDURE      left upper arm, melanoma     PICC SINGLE LUMEN PLACEMENT  8/13/2021          PICC SINGLE LUMEN PLACEMENT  9/2/2021          SPINE SURGERY      L5 area surgery, decompression          Allergies:    Allergies   Allergen Reactions     Other Environmental Allergy Anaphylaxis     Bees/Wasps Stings  Bees/Wasps Stings     Adhesive Tape Other  (See Comments)     Red,itchy and oozes  Red,itchy and oozes     Adhesive [Cyanoacrylate] Unknown     Other reaction(s): sores     Oxycodone-Acetaminophen Rash     Vicodin [Hydrocodone-Acetaminophen] Nausea and Vomiting and Hives              Medication History:    Current Outpatient Medications   Medication Sig     metroNIDAZOLE (FLAGYL) 500 MG tablet Apply 1 tablet (500 mg) topically every other day     acetaminophen (TYLENOL) 325 MG tablet 1-2 tablets as needed Orally every 4 hrs     ammonium lactate (AMLACTIN) 12 % external cream Apply topically daily to dry skin on feet/legs     ammonium lactate (AMLACTIN) 12 % external cream Apply topically daily Apply to bilateral feet/heels daily     Ascorbic Acid (VITAMIN C) 500 MG CHEW 1 tablet Orally Two times daily with meals     aspirin (ASPIRIN 81) 81 MG chewable tablet Take 1 tablet by mouth     aspirin 81 mg chewable tablet Take 81 mg by mouth every evening      atorvastatin (LIPITOR) 20 MG tablet Take 20 mg by mouth daily     benzonatate (TESSALON) 100 MG capsule Take 1 capsule (100 mg) by mouth 3 times daily as needed for cough     Bioflavonoid Products (CINDY-C) 500-550 MG TABS Take 1 tablet by mouth 2 times daily     buPROPion (WELLBUTRIN SR) 150 MG 12 hr tablet Take 150 mg by mouth every morning      calcium carbonate (TUMS) 500 MG chewable tablet Take 1 tablet (500 mg) by mouth daily as needed for heartburn     Carboxymethylcellulose Sodium 0.25 % SOLN Apply 0.05 mLs to eye     citalopram (CELEXA) 40 MG tablet Take 40 mg by mouth every morning      [employer] Super B Complex/C TABS Take 1 tablet by mouth daily     cyanocobalamin (VITAMIN B-12) 1000 MCG tablet Take 1 tablet (1,000 mcg) by mouth daily     cyclobenzaprine (FLEXERIL) 10 MG tablet Take 1 tablet (10 mg) by mouth every 8 hours as needed for muscle spasms     ferrous sulfate (FEROSUL) 325 (65 Fe) MG tablet Take 1 tablet (325 mg) by mouth daily     furosemide (LASIX) 40 MG tablet Take 1 tablet (40 mg) by mouth  "daily     gabapentin (NEURONTIN) 100 MG capsule TAKE 1 CAPSULE BY MOUTH 3 TIMES A DAY FOR 30 DAYS     gabapentin (NEURONTIN) 300 MG capsule TAKE 1 CAPSULE BY MOUTH THREE TIMES DAILY     gabapentin (NEURONTIN) 400 MG capsule Take 1 capsule (400 mg) by mouth 3 times daily     guaiFENesin (ROBITUSSIN) 20 mg/mL SOLN solution Take 10 mLs by mouth every 4 hours as needed for cough     HYDROmorphone (DILAUDID) 2 MG tablet Take 1 tablet (2 mg) by mouth every 4 hours as needed for moderate to severe pain     lidocaine (LIDODERM) 5 % patch 1 PATCH REMOVE AFTER 12 HOURS EXTERNALLY ONCE A DAY     magnesium oxide (MAG-OX) 400 MG tablet Take 1 tablet by mouth     metroNIDAZOLE (FLAGYL) 500 MG tablet CRUSH 1 TABLET AND SPRINKLE ONTO THE WOUND EVERY DAY.     multivitamin w/minerals (CENTRUM ADULTS) tablet Take 1 tablet by mouth daily      nitroGLYcerin (NITROSTAT) 0.4 MG sublingual tablet PLACE 1 TABLET UNDER TONGUE EVERY 5 MINTUES AS NEEDED FOR CHEST PAIN FOR UP TO 30 DAYS     Nutritional Supplements (VARICOSE VEINS FORMULA OR) Take 1 tablet by mouth every morning     pantoprazole (PROTONIX) 40 MG EC tablet Take 1 tablet (40 mg) by mouth 2 times daily (before meals)     polyethylene glycol (MIRALAX) 17 GM/Dose powder 17 g     potassium chloride ER (KLOR-CON M) 20 MEQ CR tablet Take 20 mEq by mouth every evening (TAKE IN ADDITION TO AM/NOON DOSE FOR TOTAL 30mEq DAILY)     rOPINIRole (REQUIP) 0.5 MG tablet Take 1 tablet (0.5 mg) by mouth 2 times daily     rOPINIRole (REQUIP) 1 MG tablet Take 1 mg by mouth At Bedtime (TAKE IN ADDITION TO AM/NOON DOSE FOR TOTAL 2MG DAILY)     senna-docusate (SENNA S) 8.6-50 MG tablet Take 1 tablet by mouth as needed     simvastatin (ZOCOR) 40 MG tablet [SIMVASTATIN (ZOCOR) 40 MG TABLET] Take 40 mg by mouth bedtime.     Skin Protectants, Misc. (INTERDRY 10\"X36\") SHEE Externally apply 7 Units topically once as needed (change daily in groin rash/fold)     spironolactone (ALDACTONE) 25 MG tablet " [SPIRONOLACTONE (ALDACTONE) 25 MG TABLET] Take 25 mg by mouth daily.     triamcinolone (ARISTOCORT HP) 0.5 % external cream Apply topically daily Apply to red/irritated skin around wound daily     triamcinolone (KENALOG) 0.1 % external cream Apply topically 2 times daily     triamcinolone (KENALOG) 0.1 % external cream Apply topically 2 times daily Apply to intact skin on right leg     triamcinolone (KENALOG) 0.1 % external ointment Apply topically every 48 hours To intact skin on right leg, as well as posterior calf on right leg redness     vitamin B-Complex Take 1 tablet by mouth daily     vitamin D3 (CHOLECALCIFEROL) 250 mcg (15420 units) capsule Take 1 capsule (250 mcg) by mouth daily     zinc 50 MG TABS Take 1 tablet by mouth     No current facility-administered medications for this encounter.         Tobacco History:  reports that she quit smoking about 47 years ago. Her smoking use included cigarettes. She has a 3.00 pack-year smoking history. She has never used smokeless tobacco.       REVIEW OF SYMPTOMS:   The review of systems was negative except as noted in the HPI.           PHYSICAL EXAMINATION:     BP (!) 169/74 (BP Location: Left arm, Patient Position: Sitting)   Pulse 84   Temp 97.8  F (36.6  C) (Temporal)            GENERAL: The patient overall appears well and is no acute distress.   HEAD: normocephalic   EYES: Sclera and conjunctiva clear   NECK: no obvious masses   LUNGS: breathing is unlabored.   EXTREMITIES: No clubbing, cyanosis or edema   SKIN: No rashes or other abnormalities except as noted under the Wound section below.   NEUROLOGICAL: normal motor and sensory function   EDEMA: Sever  and Lymphedema       WOUND: The wound appears healthy with no sign of infection.   Wound bed: necrotic material  Periwound: healthy intact skin  She has a number of fairly superficial open areas on the right lower extremity with necrotic material in them.      Also see below for wound details:        Circumferential volume measures:            6/10/2021    11:00 AM 6/16/2021     1:00 PM 6/22/2021     1:00 PM 6/29/2021     2:00 PM 7/30/2021     1:00 PM   Circumferential Measures   Right just above MTP 25.5 23.6 24.8 24 23.3   Right Ankle 25.5 25.4 27 26 25.2   Right Widest Calf 61 56.6 58 59.5 61   Right Thigh Up 10cm 78    78.8   Left - just above MTP 23.5 22.6 23.8 23 24   Left Ankle 28 24.2 26.5 25.5 25   Left Widest Calf 54 53.6 54.6 53.5 51.5   Left Thigh Up 10cm 71   68.5 77.2       Ulceration(s)/Wound(s):   Please see the media tab under the chart review for pictures of the wounds.  Nursing staff removed dressings and cleansed wound.    Wound (used by LTAC, located within [facility] only) 03/10/22 0900 Right lower;anterior leg ulceration, venous (Active)   Thickness/Stage full thickness 07/11/23 1044   Base red 07/11/23 1044   Periwound swelling;redness 07/11/23 1044   Periwound Temperature warm 07/11/23 1044   Periwound Skin Turgor soft 07/11/23 1044   Edges open 07/11/23 1044   Length (cm) 2 07/11/23 1044   Width (cm) 2.2 07/11/23 1044   Depth (cm) 0.2 07/11/23 1044   Wound (cm^2) 4.4 cm^2 07/11/23 1044   Wound Volume (cm^3) 0.88 cm^3 07/11/23 1044   Wound healing % 99.48 07/11/23 1044   Drainage Characteristics/Odor serosanguineous 07/11/23 1044   Drainage Amount large 07/11/23 1044   Care, Wound debrided 07/11/23 1044       Wound (used by [facility] within [facility] only) 03/10/22 0931 Right lower;medial leg ulceration, venous (Active)   Thickness/Stage full thickness 07/11/23 1044   Base slough 07/11/23 1044   Periwound intact;redness;swelling 07/11/23 1044   Periwound Temperature warm 07/11/23 1044   Periwound Skin Turgor soft 07/11/23 1044   Edges open 07/11/23 1044   Length (cm) 0.6 07/11/23 1044   Width (cm) 0.7 07/11/23 1044   Depth (cm) 0.1 07/11/23 1044   Wound (cm^2) 0.42 cm^2 07/11/23 1044   Wound Volume (cm^3) 0.04 cm^3 07/11/23 1044   Wound healing % 98.32 07/11/23 1044   Drainage Characteristics/Odor serosanguineous 07/11/23 1044    Drainage Amount moderate 07/11/23 1044   Care, Wound debrided 07/11/23 1044       Wound (used by [facility] only) 03/10/22 0939 Right posterior;lower leg ulceration, venous (Active)   Thickness/Stage partial thickness 07/11/23 1044   Base red;pink 07/11/23 1044   Periwound intact;swelling;redness 07/11/23 1044   Periwound Temperature warm 07/11/23 1044   Periwound Skin Turgor soft 07/11/23 1044   Length (cm) 3.5 07/11/23 1044   Width (cm) 2.5 07/11/23 1044   Depth (cm) 0.1 07/11/23 1044   Wound (cm^2) 8.75 cm^2 07/11/23 1044   Wound Volume (cm^3) 0.88 cm^3 07/11/23 1044   Wound healing % 45.31 07/11/23 1044   Drainage Characteristics/Odor serosanguineous 07/11/23 1044   Drainage Amount moderate 07/11/23 1044   Care, Wound non-select wound debridement performed 07/11/23 1044       Wound (used by OP [facility] only) 07/11/23 1058 Right posterior;proximal;lower leg (Active)   Thickness/Stage partial thickness 07/11/23 1044   Base red;pink 07/11/23 1044   Periwound intact;swelling;redness 07/11/23 1044   Periwound Temperature warm 07/11/23 1044   Periwound Skin Turgor soft 07/11/23 1044   Edges open 07/11/23 1044   Length (cm) 1 07/11/23 1044   Width (cm) 1 07/11/23 1044   Depth (cm) 0.2 07/11/23 1044   Wound (cm^2) 1 cm^2 07/11/23 1044   Wound Volume (cm^3) 0.2 cm^3 07/11/23 1044   Drainage Characteristics/Odor serosanguineous 07/11/23 1044   Drainage Amount moderate 07/11/23 1044   Care, Wound non-select wound debridement performed 07/11/23 1044             Recent Labs   Lab Test 04/04/22  0523 06/23/16  1130   A1C 5.4 5.9          Recent Labs   Lab Test 02/10/23  1535 05/25/22  1637 04/04/22  0523   ALBUMIN 4.3 4.1 1.9*      October 27, 2021 ankle-brachial indices: Normal  October 27, 2021 right lower extremity venous insufficiency ultrasound: Normal      Procedure note: , Informed Consent:  Patient acknowledges that I have explained the patient's general medical condition to him/her.  Patient has been informed and  acknowledges that I have explained the risks or complications of wound debridement including, but not limited to, scarring, damage to blood vessels or surrounding areas such as nerves and organs, allergic reactions to topical and injected anaesthetic and/or skin prep solutions.  Other risks include excessive bleeding, removal of healthy tissue, infection, pain and inflammation, and prolonged or failure to heal.  Patient acknowledges that bleeding after debridement and pain may worsen after debridement and that dead/necrotic tissue may cause bacteria and toxins to be released into the bloodstream and cause sepsis or shock.  Patient acknowledges that I have explained that the wound may be larger after debridement.  Patient acknowledges that they may need serial debridements while under care in the wound department.  Patient acknowledges that they were given an opportunity to ask questions about treatment and I have answered patient's questions.   , Anesthetized as needed with lidocaine. , Using a curette and/or a scalpel I performed an excisional debridement removing all necrotic material at the Right leg wound down to the level of viable subcutaneous tissue.  I obtained hemostasis with direct pressure.  The patient tolerated the procedure well. , EBL: <5 ml  and Total debridement surface area: Less than 20 cm                  ASSESSMENT:   This is a 77 year old  female with right leg ulcers.           PLAN:   We will continue to bandage the open areas with a Vashe soak followed by Flagyl powder, Ioplex, dry dressing and the Spandage and spandagrip stockings all changed once a day.  I discussed the option with her of getting stronger compression including edema wear or a new Velcro compression garments, however she is not interested in trying these forms of stronger compression.  I did discuss the option of getting a new venous insufficiency ultrasound and she was agreeable to this.  We will order a right lower  extremity venous insufficiency ultrasound.  I explained to the patient today that controlling the edema is probably the most important thing we can do to help heal the wound.  I have specifically recommended that they lay down with their legs above the level of the heart for 30 minutes at least twice a day.    I have strongly encouraged her to continue to use her pneumatic lymphedema pump once a day to help control her swelling.  I have explained to the patient the importance of protein intake to wound healing.  I have explained that increasing protein intake will speed wound healing.  We discussed several types of food that are high in protein and the wound care nurse gave the patient a handout that summarizes this information.  In addition to further speed wound healing I have encouraged the patient to take a protein supplement.   The patient will return to the wound clinic in 3 to 4 weeks to see me again.        45 minutes spent on the date of the encounter doing chart review, history and exam, documentation and further activities per the note      Lars Torres MD  07/11/2023   11:51 AM   [facility]/Wound  [phone]    This note was electronically signed by Lars Torres MD        Further instructions from your care team       Elizabeth Kelley      1945    A DME order for supplies has been placed to [facility]. If there are any issues with your order including not receiving the order please call [facility] at [phone] option 3. They can also provide a tracking number for you if you had supplies shipped to you.    Flagyl has been ordered,  at your pharmacy.     PLAN:  -Start using your new [employer] pump! -- 1hour a day for a week, then 2hour a day  for a week, then increase to 3hours a day there after    -Consult placed with Bariatric services. They will call you in the next day or two to  schedule. If you do not hear from them, call [phone]  [phone]. (Patient will reach out in September when more coverage is available)    -Continue to work with lymphedema therapist once per week    -You have been referred to [facility] for Right lower leg Venous Competency in [place] [phone] or [place] [phone]    Wound Care recommendations to Right Anterior Lower Leg and Right medial lower leg:  -Wash your hands with soap and water before you begin your dressing change and  prepare a clean surface for dressings  -After cleansing with mild unscented soap (such as Cetaphil, Cerave or Dove) and water,  -Apply small amount of VASHE on gauze, lay into wound bed, let sit for 15 minutes,  remove gauze (do not rinse)  -Apply Triamcinolone Cream to red, irritated, itchy areas of your leg  -Sprinkle finely crushed flagyl tablets topically onto wound(s). You may purchase a pill   (can be found at a pharmacy or online) or crush by placing in a doubled plastic  bag and crush using a rolling pin.  - Apply 1/10 of 4x5\" Ioplex to right anterior lower leg and 1/12th piece of ioplex to right medial leg  -Place 1 4x4 guaze to each wound   -Secure with 1 roll of Kerlix  -Secure with medipore tape  - Pull up spandagrip size G from toes to knees  -Change every other day if drainage allows; otherwise change daily if soiled       Wound Care recommendations to Right Posterior Lower Leg:  -Apply zinc oxide barrier cream to partial thickness open areas      Left Leg:   -Keep leg clean and dry  -Apply a ceramide based lotion (Cera-Ve, Vanicream, Cetaphil)  -EdemaWear should be worn 24/7 unless bathing/showering or changing the dressing.  You will wash and reuse the EdemaWear. DO NOT CUT THE EDEMAWEAR. IF IT IS TOO  LONG THEN CUFF THE EDEMAWEAR (the EdemaWear can shrink length wise with  washing).    If increase in redness, pain, green/malodorous drainage, fever, chills or malaise, go to  ED    Compression:  Your compression is velcro compression and can be removed at night " and put back on  first thing in the morning.  Please remove compression dressing if toes turn blue and/or tingle and can not be  relieved by raising the leg for one hour. If unable to reapply in the morning, keep  compression on until next dressing change.  Walk as much as you can, as you are able. Whenever you sit raise your ankle  above your hips to promote wound healing.    Please raise your legs above your hips for 30 mins 2 times a day to promote wound healing.  Walk as much as you can. When you sit raise your ankle above your hips to promote wound healing.     A diet high in protein is important for wound healing, we recommend getting 90 grams of protein per day. Taking protein shakes or bars are a good way to get extra protein in your diet.     Good sources of protein:  Pork 26g per 3 oz  Whey protein powder - 24g per scoop (on average)  Greek yogurt - 23g per 8oz   Chicken or Turkey - 23g per 3oz  Fish - 20-25g per 3oz  Beef - 18-23g per 3oz  Navy beans - 20g per cup  Cottage cheese - 14g per 1/2 cup   Lentils - 13g per 1/4 cup  Beef jerky 13g per 1oz  2% milk - 8g per cup  Peanut butter - 8g per 2 tablespoons  Eggs - 6g per egg  Mixed nuts - 6g per 2oz       Lars Torres M.D. July 11, 2023    Call us at [phone] if you have any questions about your wounds, have redness or swelling around your wound, have a fever of 101 degrees Fahrenheit or greater or if you have any other problems or concerns. We answer the phone Monday through Friday 8 am to 4 pm, please leave a message as we check the voicemail frequently throughout the day.     If you had a positive experience please indicate that on your patient satisfaction survey form that [facility] will be sending you.    It was a pleasure meeting with you today.  Thank you for allowing me and my team the privilege of caring for you today.  YOU are the reason we are here, and I truly hope we provided you with the excellent service you deserve.   Please let us know if there is anything else we can do for you so that we can be sure you are leaving completely satisfied with your care experience.      If you have any billing related questions please call the [facility] Business office at [phone]. The clinic staff does not handle billing related matters.    If you are scheduled to have a follow up appointment, you will receive a reminder call the day before your visit. On the appointment day please arrive 15 minutes prior to your appointment time. If you are unable to keep that appointment, please call the clinic to cancel or reschedule. If you are more than 10 minutes late or greater for your scheduled appointment time, the clinic policy is that you may be asked to reschedule.         ,

## 2023-07-11 NOTE — DISCHARGE INSTRUCTIONS
"Elizabeth Kelley      1945    A DME order for supplies has been placed to [facility]. If there are any issues with your order including not receiving the order please call [facility] at [phone] option 3. They can also provide a tracking number for you if you had supplies shipped to you.    Flagyl has been ordered,  at your pharmacy.     PLAN:  -Start using your new [employer] pump! -- 1hour a day for a week, then 2hour a day  for a week, then increase to 3hours a day there after    -Consult placed with Bariatric services. They will call you in the next day or two to  schedule. If you do not hear from them, call [phone]. (Patient will reach out in September when more coverage is available)    -Continue to work with lymphedema therapist once per week    -You have been referred to [facility] for Right lower leg [facility] in [place] [phone] or [place] [phone]    Wound Care recommendations to Right Anterior Lower Leg and Right medial lower leg:  -Wash your hands with soap and water before you begin your dressing change and  prepare a clean surface for dressings  -After cleansing with mild unscented soap (such as Cetaphil, Cerave or Dove) and water,  -Apply small amount of VASHE on gauze, lay into wound bed, let sit for 15 minutes,  remove gauze (do not rinse)  -Apply Triamcinolone Cream to red, irritated, itchy areas of your leg  -Sprinkle finely crushed flagyl tablets topically onto wound(s). You may purchase a pill   (can be found at a pharmacy or online) or crush by placing in a doubled plastic  bag and crush using a rolling pin.  - Apply 1/10 of 4x5\" Ioplex to right anterior lower leg and 1/12th piece of ioplex to right medial leg  -Place 1 4x4 guaze to each wound   -Secure with 1 roll of Kerlix  -Secure with medipore tape  - Pull up spandagrip size G from toes to knees  -Change every other day if drainage allows; otherwise change daily if " soiled       Wound Care recommendations to Right Posterior Lower Leg:  -Apply zinc oxide barrier cream to partial thickness open areas      Left Leg:   -Keep leg clean and dry  -Apply a ceramide based lotion (Cera-Ve, Vanicream, Cetaphil)  -EdemaWear should be worn 24/7 unless bathing/showering or changing the dressing.  You will wash and reuse the EdemaWear. DO NOT CUT THE EDEMAWEAR. IF IT IS TOO  LONG THEN CUFF THE EDEMAWEAR (the EdemaWear can shrink length wise with  washing).    If increase in redness, pain, green/malodorous drainage, fever, chills or malaise, go to  ED    Compression:  Your compression is velcro compression and can be removed at night and put back on  first thing in the morning.  Please remove compression dressing if toes turn blue and/or tingle and can not be  relieved by raising the leg for one hour. If unable to reapply in the morning, keep  compression on until next dressing change.  Walk as much as you can, as you are able. Whenever you sit raise your ankle  above your hips to promote wound healing.    Please raise your legs above your hips for 30 mins 2 times a day to promote wound healing.  Walk as much as you can. When you sit raise your ankle above your hips to promote wound healing.     A diet high in protein is important for wound healing, we recommend getting 90 grams of protein per day. Taking protein shakes or bars are a good way to get extra protein in your diet.     Good sources of protein:  Pork 26g per 3 oz  Whey protein powder - 24g per scoop (on average)  Greek yogurt - 23g per 8oz   Chicken or Turkey - 23g per 3oz  Fish - 20-25g per 3oz  Beef - 18-23g per 3oz  Navy beans - 20g per cup  Cottage cheese - 14g per 1/2 cup   Lentils - 13g per 1/4 cup  Beef jerky 13g per 1oz  2% milk - 8g per cup  Peanut butter - 8g per 2 tablespoons  Eggs - 6g per egg  Mixed nuts - 6g per 2oz       Lars Torres M.D. July 11, 2023    Call us at [phone] if you have any questions about  your wounds, have redness or swelling around your wound, have a fever of 101 degrees Fahrenheit or greater or if you have any other problems or concerns. We answer the phone Monday through Friday 8 am to 4 pm, please leave a message as we check the voicemail frequently throughout the day.     If you had a positive experience please indicate that on your patient satisfaction survey form that [facility] will be sending you.    It was a pleasure meeting with you today.  Thank you for allowing me and my team the privilege of caring for you today.  YOU are the reason we are here, and I truly hope we provided you with the excellent service you deserve.  Please let us know if there is anything else we can do for you so that we can be sure you are leaving completely satisfied with your care experience.      If you have any billing related questions please call the [facility] Business office at [phone]. The clinic staff does not handle billing related matters.    If you are scheduled to have a follow up appointment, you will receive a reminder call the day before your visit. On the appointment day please arrive 15 minutes prior to your appointment time. If you are unable to keep that appointment, please call the clinic to cancel or reschedule. If you are more than 10 minutes late or greater for your scheduled appointment time, the clinic policy is that you may be asked to reschedule.

## 2023-07-20 ENCOUNTER — THERAPY VISIT (OUTPATIENT)
Dept: PHYSICAL THERAPY | Facility: REHABILITATION | Age: 78
End: 2023-07-20
Payer: COMMERCIAL

## 2023-07-20 DIAGNOSIS — I87.303 VENOUS HYPERTENSION OF LOWER EXTREMITY, BILATERAL: ICD-10-CM

## 2023-07-20 DIAGNOSIS — M62.81 GENERALIZED MUSCLE WEAKNESS: Primary | ICD-10-CM

## 2023-07-20 DIAGNOSIS — I89.0 ACQUIRED LYMPHEDEMA OF LEG: ICD-10-CM

## 2023-07-20 DIAGNOSIS — R26.81 UNSTEADINESS ON FEET: ICD-10-CM

## 2023-07-20 PROCEDURE — 97140 MANUAL THERAPY 1/> REGIONS: CPT | Mod: GP | Performed by: PHYSICAL THERAPIST

## 2023-07-27 ENCOUNTER — THERAPY VISIT (OUTPATIENT)
Dept: PHYSICAL THERAPY | Facility: REHABILITATION | Age: 78
End: 2023-07-27
Payer: COMMERCIAL

## 2023-07-27 DIAGNOSIS — I87.303 VENOUS HYPERTENSION OF LOWER EXTREMITY, BILATERAL: ICD-10-CM

## 2023-07-27 DIAGNOSIS — R26.81 UNSTEADINESS ON FEET: ICD-10-CM

## 2023-07-27 DIAGNOSIS — I89.0 ACQUIRED LYMPHEDEMA OF LEG: ICD-10-CM

## 2023-07-27 DIAGNOSIS — M62.81 GENERALIZED MUSCLE WEAKNESS: Primary | ICD-10-CM

## 2023-07-27 PROCEDURE — 97140 MANUAL THERAPY 1/> REGIONS: CPT | Mod: GP | Performed by: PHYSICAL THERAPIST

## 2023-07-31 NOTE — PROGRESS NOTES
[id number]  Cognitive Concerns/ Orientation: A&Ox4. Calm/Cooperative.  BEHAVIOR & AGGRESSION TOOL COLOR: Green    ABNL VS/O2: VSS on 1 L O2; low 90s with activity. CPAP overnight  MOBILITY: Assist x1 GBW-refuses GB at times; walked hallway x1  PAIN MANAGMENT: Denies pain  DIET: Regular  diet, no straws- aspiration precautions.  BOWEL/BLADDER: continent, Pivot to bedside commode  ABNL LAB: no abnormal labs today.  DRAIN/DEVICES: no IV access  TELEMETRY RHYTHM: N/A  SKIN: per previous note-RLE wound done by Dr. Bullock (vascular surgery) on 4/15, dressings CDI. Takled with hospitalist who stated that Dr. Bullock will be coming today. Michelle area redness with tenderness cleansed per wound order. Scattered bruises, +2 BLE edema, pulses weak BLE   TESTS/PROCEDURES:   D/C DAY/GOALS/PLACE: TBD, pending  TCU vs LTACH placement, SW following.  OTHER IMPORTANT INFO: Maintained contact isolation for Hx of MRSA. PT/WOC following.    Stable

## 2023-08-03 ENCOUNTER — ANCILLARY PROCEDURE (OUTPATIENT)
Dept: ULTRASOUND IMAGING | Facility: CLINIC | Age: 78
End: 2023-08-03
Attending: FAMILY MEDICINE
Payer: COMMERCIAL

## 2023-08-03 DIAGNOSIS — I10 ESSENTIAL HYPERTENSION: ICD-10-CM

## 2023-08-03 DIAGNOSIS — L97.912 ULCER OF RIGHT LOWER EXTREMITY WITH FAT LAYER EXPOSED (H): ICD-10-CM

## 2023-08-03 DIAGNOSIS — I87.2 PERIPHERAL VENOUS INSUFFICIENCY: ICD-10-CM

## 2023-08-03 DIAGNOSIS — I87.303 VENOUS HYPERTENSION OF LOWER EXTREMITY, BILATERAL: ICD-10-CM

## 2023-08-03 PROCEDURE — 93971 EXTREMITY STUDY: CPT | Mod: RT | Performed by: SURGERY

## 2023-08-06 ENCOUNTER — HEALTH MAINTENANCE LETTER (OUTPATIENT)
Age: 78
End: 2023-08-06

## 2023-08-09 ENCOUNTER — HOSPITAL ENCOUNTER (OUTPATIENT)
Dept: WOUND CARE | Facility: CLINIC | Age: 78
Discharge: HOME | End: 2023-08-09
Attending: FAMILY MEDICINE | Admitting: FAMILY MEDICINE
Payer: COMMERCIAL

## 2023-08-09 ENCOUNTER — TELEPHONE (OUTPATIENT)
Dept: VASCULAR SURGERY | Facility: CLINIC | Age: 78
End: 2023-08-09

## 2023-08-09 VITALS — DIASTOLIC BLOOD PRESSURE: 71 MMHG | TEMPERATURE: 98.4 F | SYSTOLIC BLOOD PRESSURE: 176 MMHG | HEART RATE: 73 BPM

## 2023-08-09 DIAGNOSIS — R52 PAIN ASSOCIATED WITH WOUND: ICD-10-CM

## 2023-08-09 DIAGNOSIS — T14.8XXA PAIN ASSOCIATED WITH WOUND: ICD-10-CM

## 2023-08-09 DIAGNOSIS — E66.01 MORBID OBESITY (H): ICD-10-CM

## 2023-08-09 DIAGNOSIS — G25.81 RESTLESS LEGS: ICD-10-CM

## 2023-08-09 DIAGNOSIS — I89.0 ACQUIRED LYMPHEDEMA OF LEG: ICD-10-CM

## 2023-08-09 DIAGNOSIS — L97.912 ULCER OF RIGHT LOWER EXTREMITY WITH FAT LAYER EXPOSED (H): Primary | ICD-10-CM

## 2023-08-09 DIAGNOSIS — I87.2 PERIPHERAL VENOUS INSUFFICIENCY: ICD-10-CM

## 2023-08-09 PROCEDURE — 99214 OFFICE O/P EST MOD 30 MIN: CPT | Mod: 25 | Performed by: FAMILY MEDICINE

## 2023-08-09 PROCEDURE — 11042 DBRDMT SUBQ TIS 1ST 20SQCM/<: CPT | Performed by: FAMILY MEDICINE

## 2023-08-09 NOTE — ADDENDUM NOTE
Encounter addended by: Jackelyn Dumont RN on: 8/9/2023 12:27 PM   Actions taken: Order list changed, Diagnosis association updated

## 2023-08-09 NOTE — PROGRESS NOTES
Wound Clinic Note         Visit date: 08/09/2023       Cheif Complaint:     Elizabeth Kelley is a 78 year old   female had concerns including WOUND CARE.  The patient has right leg ulcers.          HISTORY OF PRESENT ILLNESS:    Elizabeth Kelley reports the wound has been present since 2020.  The wound began due to the area being bumped.   She has chronic lymphedema and works with the lymphedema clinic once a week.  The patient denies a history of congestive heart failure, previous vein procedures or previous DVT.       Since her last clinic visit with me she has been changing the bandages beginning with a Vashe soak followed by Flagyl powder, Ioplex, dry dressing and a Spandage stocking all changed every other day.  There is been light serous drainage from the wounds.  There has been no difficulties with the dressing changes.      The pateint denies fevers or chills.  They report the pain from the wound has been 0/10 and has remained about the same recently.      The patient reports laying down to elevate their legs above the level of their heart at least twice a day.     She does have pneumatic lymphedema pumps which she uses once a day to help control her swelling.    Today the patient reports maintaining a high protein diet, but has not been taking protein supplements lately.        The patient denies a history of diabetes, smoking or chronic steroid use.         The patient has not had any symptoms of infection relating to the wound recently and is not currently on antibiotics.       Problem List:   Past Medical History:   Diagnosis Date    Ankle contracture, left     Class 3 severe obesity due to excess calories without serious comorbidity with body mass index (BMI) of 45.0 to 49.9 in adult (H)     Combined gastric and duodenal ulcer     Controlled substance agreement broken     Depression     Dyslipidemia     Edema     Edema     Gait abnormality     GERD (gastroesophageal reflux disease)     Gout      Lymphedema of both lower extremities     Melanoma (H)     left upper arm    MS (multiple sclerosis) (H)     RLS (restless legs syndrome)     Skin ulcer of left lower leg (H)     Valgus deformity of both feet     Vitamin D deficiency              Family Hx: family history includes Arthritis in her maternal grandmother, maternal uncle, paternal grandfather, paternal grandmother, and sister; Asthma in her sister; Brain Cancer in her father; Breast Cancer in her paternal aunt; Colon Cancer in her paternal aunt; Early Death in her maternal grandfather; Hyperlipidemia in her mother, paternal aunt, and sister; Hypertension in her mother, paternal aunt, and sister; Multiple Sclerosis in her mother and sister; Obesity in her sister; Uterine Cancer in her mother.       Surgical Hx:   Past Surgical History:   Procedure Laterality Date    ABLATION SAPHENOUS VEIN W/ RFA Right 04/12/2021    Saphenous vein, anterior accessory saphenous vein, sclerotherapy of multiple veins.    COSMETIC SURGERY  1988    reduction of excess skin from weight loss    ESOPHAGOSCOPY, GASTROSCOPY, DUODENOSCOPY (EGD), COMBINED N/A 03/30/2015    Procedure: UPPER ENDOSCOPY with gastric biopsy;  Surgeon: Checo Flecther MD;  Location: [facility] GI;  Service:     IRRIGATION AND DEBRIDEMENT LOWER EXTREMITY, COMBINED Right 3/21/2022    Procedure: RIGHT LEG WOUND DEBRIDEMENT, PAULIE DERMATONE, MISONIX, VAC VERA FLOW WITH VASHE;  Surgeon: Gabriele Bullock MD;  Location:  OR    IRRIGATION AND DEBRIDEMENT LOWER EXTREMITY, COMBINED Right 3/28/2022    Procedure: DEBRIDEMENT AND WOUND DRESSING CHANGE AND MYRIAD APPLICATION OF RIGHT LOWER LEG WOUND;  Surgeon: Gabriele Bullock MD;  Location:  OR    MAMMOPLASTY REDUCTION Bilateral     MOHS MICROGRAPHIC PROCEDURE      left upper arm, melanoma    PICC SINGLE LUMEN PLACEMENT  8/13/2021         PICC SINGLE LUMEN PLACEMENT  9/2/2021         SPINE SURGERY      L5 area surgery, decompression          Allergies:     Allergies   Allergen Reactions    Other Environmental Allergy Anaphylaxis     Bees/Wasps Stings  Bees/Wasps Stings    Adhesive Tape Other (See Comments)     Red,itchy and oozes  Red,itchy and oozes    Adhesive [Cyanoacrylate] Unknown     Other reaction(s): sores    Oxycodone-Acetaminophen Rash    Vicodin [Hydrocodone-Acetaminophen] Nausea and Vomiting and Hives              Medication History:    Current Outpatient Medications   Medication Sig    acetaminophen (TYLENOL) 325 MG tablet 1-2 tablets as needed Orally every 4 hrs    ammonium lactate (AMLACTIN) 12 % external cream Apply topically daily to dry skin on feet/legs    ammonium lactate (AMLACTIN) 12 % external cream Apply topically daily Apply to bilateral feet/heels daily    Ascorbic Acid (VITAMIN C) 500 MG CHEW 1 tablet Orally Two times daily with meals    aspirin (ASPIRIN 81) 81 MG chewable tablet Take 1 tablet by mouth    aspirin 81 mg chewable tablet Take 81 mg by mouth every evening     atorvastatin (LIPITOR) 20 MG tablet Take 20 mg by mouth daily    benzonatate (TESSALON) 100 MG capsule Take 1 capsule (100 mg) by mouth 3 times daily as needed for cough    Bioflavonoid Products (CINDY-C) 500-550 MG TABS Take 1 tablet by mouth 2 times daily    buPROPion (WELLBUTRIN SR) 150 MG 12 hr tablet Take 150 mg by mouth every morning     calcium carbonate (TUMS) 500 MG chewable tablet Take 1 tablet (500 mg) by mouth daily as needed for heartburn    Carboxymethylcellulose Sodium 0.25 % SOLN Apply 0.05 mLs to eye    citalopram (CELEXA) 40 MG tablet Take 40 mg by mouth every morning     [employer] Super B Complex/C TABS Take 1 tablet by mouth daily    cyanocobalamin (VITAMIN B-12) 1000 MCG tablet Take 1 tablet (1,000 mcg) by mouth daily    cyclobenzaprine (FLEXERIL) 10 MG tablet Take 1 tablet (10 mg) by mouth every 8 hours as needed for muscle spasms    ferrous sulfate (FEROSUL) 325 (65 Fe) MG tablet Take 1 tablet (325 mg) by mouth daily    furosemide (LASIX) 40 MG tablet  "Take 1 tablet (40 mg) by mouth daily    gabapentin (NEURONTIN) 100 MG capsule TAKE 1 CAPSULE BY MOUTH 3 TIMES A DAY FOR 30 DAYS    gabapentin (NEURONTIN) 300 MG capsule TAKE 1 CAPSULE BY MOUTH THREE TIMES DAILY    gabapentin (NEURONTIN) 400 MG capsule Take 1 capsule (400 mg) by mouth 3 times daily    guaiFENesin (ROBITUSSIN) 20 mg/mL SOLN solution Take 10 mLs by mouth every 4 hours as needed for cough    HYDROmorphone (DILAUDID) 2 MG tablet Take 1 tablet (2 mg) by mouth every 4 hours as needed for moderate to severe pain    lidocaine (LIDODERM) 5 % patch 1 PATCH REMOVE AFTER 12 HOURS EXTERNALLY ONCE A DAY    magnesium oxide (MAG-OX) 400 MG tablet Take 1 tablet by mouth    metroNIDAZOLE (FLAGYL) 500 MG tablet Apply 1 tablet (500 mg) topically every other day    metroNIDAZOLE (FLAGYL) 500 MG tablet CRUSH 1 TABLET AND SPRINKLE ONTO THE WOUND EVERY DAY.    multivitamin w/minerals (CENTRUM ADULTS) tablet Take 1 tablet by mouth daily     nitroGLYcerin (NITROSTAT) 0.4 MG sublingual tablet PLACE 1 TABLET UNDER TONGUE EVERY 5 MINTUES AS NEEDED FOR CHEST PAIN FOR UP TO 30 DAYS    Nutritional Supplements (VARICOSE VEINS FORMULA OR) Take 1 tablet by mouth every morning    pantoprazole (PROTONIX) 40 MG EC tablet Take 1 tablet (40 mg) by mouth 2 times daily (before meals)    polyethylene glycol (MIRALAX) 17 GM/Dose powder 17 g    potassium chloride ER (KLOR-CON M) 20 MEQ CR tablet Take 20 mEq by mouth every evening (TAKE IN ADDITION TO AM/NOON DOSE FOR TOTAL 30mEq DAILY)    rOPINIRole (REQUIP) 0.5 MG tablet Take 1 tablet (0.5 mg) by mouth 2 times daily    rOPINIRole (REQUIP) 1 MG tablet Take 1 mg by mouth At Bedtime (TAKE IN ADDITION TO AM/NOON DOSE FOR TOTAL 2MG DAILY)    senna-docusate (SENNA S) 8.6-50 MG tablet Take 1 tablet by mouth as needed    simvastatin (ZOCOR) 40 MG tablet [SIMVASTATIN (ZOCOR) 40 MG TABLET] Take 40 mg by mouth bedtime.    Skin Protectants, Misc. (INTERDRY 10\"X36\") SHEE Externally apply 7 Units topically " once as needed (change daily in groin rash/fold)    spironolactone (ALDACTONE) 25 MG tablet [SPIRONOLACTONE (ALDACTONE) 25 MG TABLET] Take 25 mg by mouth daily.    triamcinolone (ARISTOCORT HP) 0.5 % external cream Apply topically daily Apply to red/irritated skin around wound daily    triamcinolone (KENALOG) 0.1 % external cream Apply topically 2 times daily    triamcinolone (KENALOG) 0.1 % external cream Apply topically 2 times daily Apply to intact skin on right leg    triamcinolone (KENALOG) 0.1 % external ointment Apply topically every 48 hours To intact skin on right leg, as well as posterior calf on right leg redness    vitamin B-Complex Take 1 tablet by mouth daily    vitamin D3 (CHOLECALCIFEROL) 250 mcg (62871 units) capsule Take 1 capsule (250 mcg) by mouth daily    zinc 50 MG TABS Take 1 tablet by mouth     No current facility-administered medications for this encounter.         Tobacco History:  reports that she quit smoking about 47 years ago. Her smoking use included cigarettes. She has a 3.00 pack-year smoking history. She has never used smokeless tobacco.       REVIEW OF SYMPTOMS:   The review of systems was negative except as noted in the HPI.           PHYSICAL EXAMINATION:     BP (!) 176/71 (BP Location: Left arm)   Pulse 73   Temp 98.4  F (36.9  C) (Temporal)            GENERAL: The patient overall appears well and is no acute distress.   HEAD: normocephalic   EYES: Sclera and conjunctiva clear   NECK: no obvious masses   LUNGS: breathing is unlabored.   EXTREMITIES: No clubbing, cyanosis or edema   SKIN: No rashes or other abnormalities except as noted under the Wound section below.   NEUROLOGICAL: normal motor and sensory function   EDEMA: Sever  and Lymphedema       WOUND: The wound appears healthy with no sign of infection.   Wound bed: necrotic material  Periwound: healthy intact skin  Although superficial open areas that she had previously have healed and there are just the 2 deeper  full-thickness wounds remaining on the right lower extremity.      Also see below for wound details:       Circumferential volume measures:            6/10/2021    11:00 AM 6/16/2021     1:00 PM 6/22/2021     1:00 PM 6/29/2021     2:00 PM 7/30/2021     1:00 PM   Circumferential Measures   Right just above MTP 25.5 23.6 24.8 24 23.3   Right Ankle 25.5 25.4 27 26 25.2   Right Widest Calf 61 56.6 58 59.5 61   Right Thigh Up 10cm 78    78.8   Left - just above MTP 23.5 22.6 23.8 23 24   Left Ankle 28 24.2 26.5 25.5 25   Left Widest Calf 54 53.6 54.6 53.5 51.5   Left Thigh Up 10cm 71   68.5 77.2       Ulceration(s)/Wound(s):   Please see the media tab under the chart review for pictures of the wounds.  Nursing staff removed dressings and cleansed wound.    Wound (used by [facility] only) 03/10/22 0900 Right lower;anterior leg ulceration, venous (Active)   Thickness/Stage full thickness 08/09/23 1100   Base slough 08/09/23 1100   Periwound swelling;redness 08/09/23 1100   Periwound Temperature warm 08/09/23 1100   Periwound Skin Turgor soft 08/09/23 1100   Edges open 08/09/23 1100   Length (cm) 2.4 08/09/23 1100   Width (cm) 2 08/09/23 1100   Depth (cm) 0.2 08/09/23 1100   Wound (cm^2) 4.8 cm^2 08/09/23 1100   Wound Volume (cm^3) 0.96 cm^3 08/09/23 1100   Wound healing % 99.43 08/09/23 1100   Drainage Characteristics/Odor serosanguineous 08/09/23 1100   Drainage Amount moderate 08/09/23 1100   Care, Wound debrided 08/09/23 1100       [facility] 03/10/22 0931 Right lower;medial leg ulceration, venous (Active)   Thickness/Stage full thickness 08/09/23 1100   Base slough 08/09/23 1100   Periwound intact;redness;swelling 08/09/23 1100   Periwound Temperature warm 08/09/23 1100   Periwound Skin Turgor soft 08/09/23 1100   Edges open 08/09/23 1100   Length (cm) 0.9 08/09/23 1100   Width (cm) 0.7 08/09/23 1100   Depth (cm) 0.2 08/09/23 1100   Wound (cm^2) 0.63 cm^2 08/09/23 1100   Wound Volume (cm^3) 0.13 cm^3  08/09/23 1100   Wound healing % 97.48 08/09/23 1100   Drainage Characteristics/Odor serosanguineous 08/09/23 1100   Drainage Amount moderate 08/09/23 1100   Care, Wound debrided 08/09/23 1100       Wound (used by [facility] only) 03/10/22 0939 Right posterior;lower leg ulceration, venous (Active)   Thickness/Stage partial thickness 08/09/23 1100   Base epithelialization;dry;scab 08/09/23 1100   Periwound intact;swelling;redness 08/09/23 1100   Periwound Temperature warm 08/09/23 1100   Periwound Skin Turgor soft 08/09/23 1100   Edges rolled/closed 08/09/23 1100   Length (cm) 0 08/09/23 1100   Width (cm) 0 08/09/23 1100   Depth (cm) 0 08/09/23 1100   Wound (cm^2) 0 cm^2 08/09/23 1100   Wound Volume (cm^3) 0 cm^3 08/09/23 1100   Wound healing % 100 08/09/23 1100   Drainage Amount none 08/09/23 1100       Wound (used by [facility] only) 07/11/23 1058 Right posterior;proximal;lower leg (Active)   Thickness/Stage partial thickness 08/09/23 1100   Base epithelialization;scab 08/09/23 1100   Periwound intact;swelling;redness 08/09/23 1100   Periwound Temperature warm 08/09/23 1100   Periwound Skin Turgor soft 08/09/23 1100   Edges rolled/closed 08/09/23 1100   Length (cm) 0 08/09/23 1100   Width (cm) 0 08/09/23 1100   Depth (cm) 0 08/09/23 1100   Wound (cm^2) 0 cm^2 08/09/23 1100   Wound Volume (cm^3) 0 cm^3 08/09/23 1100   Wound healing % 100 08/09/23 1100   Drainage Characteristics/Odor serosanguineous 08/09/23 1100   Drainage Amount none 08/09/23 1100               Recent Labs   Lab Test 04/04/22  0523 06/23/16  1130   A1C 5.4 5.9          Recent Labs   Lab Test 02/10/23  1535 05/25/22  1637 04/04/22  0523   ALBUMIN 4.3 4.1 1.9*      October 27, 2021 ankle-brachial indices: Normal  October 27, 2021 right lower extremity venous insufficiency ultrasound: Normal      Procedure note: , I had previous obtain informed consent from the patient to perform serial debridements, I confirmed this again with the patient today verbally. ,  Anesthetized as needed with lidocaine. , Using a curette and/or a scalpel I performed an excisional debridement removing all necrotic material at the Right leg wound down to the level of viable subcutaneous tissue.  I obtained hemostasis with direct pressure.  The patient tolerated the procedure well. , EBL: <5 ml , and Total debridement surface area: Less than 20 cm                  ASSESSMENT:   This is a 78 year old  female with right leg ulcers.           PLAN:   We will continue to bandage the open areas with a Vashe soak followed by Flagyl powder, Ioplex, dry dressing and the Spandage and a double spandagrip stockings all changed once a day.  Will begin using the double spandagrip stocking to apply more compression to the area to help control the swelling better.  Since her last clinic visit with me she had a venous insufficiency ultrasound performed on August 3, 2023 which did show areas of superficial venous insufficiency.  I recommend that she meet with a provider to discuss treatment options for her venous insufficiency.  She was in agreement with this and we will put in a referral.  I have encouraged the patient to continue to elevate the legs as they have been doing, including laying down with their legs above the level of the heart for 30 minutes at least twice a day.    I have strongly encouraged her to continue to use her pneumatic lymphedema pump once a day to help control her swelling.  I have encouraged the patient to continue on their high protein diet to aid in wound healing.   The patient will return to the wound clinic in 3 to 4 weeks to see me again.        30 minutes spent on the date of the encounter doing chart review, history and exam, documentation and further activities per the note      Lars Torres MD  08/09/2023   12:13 PM   [facility]  [phone]    This note was electronically signed by Lars Torres MD        Further instructions from your  "care team         Elizabeth VERA Warren      1945  A DME order for supplies has been placed to [facility]. If there are any issues with your order including not receiving the order please call [facility] at [phone] option 1. They can also provide a tracking number for you if you had supplies shipped to you.    [facility] Phone: [phone] Dr Velázquez please call for appointment  [address], [address] [place] [address]    PLAN:  -Continue using your new [employer] pump! -- 1hour a day for a week, try to increase each week   Continue to work with lymphedema therapist once per week  Please raise your legs above your hips for 30 mins 2 times a day to promote wound healing.  Walk as much as you can. When you sit raise your ankle above your hips to promote wound healing.     Diet: A diet high in protein: shakes, bars, eggs, peanut butter, cottage cheese       Wound Care recommendations to Right Anterior Lower Leg   -Wash your hands with soap and water before you begin your dressing change and prepare a clean surface for dressings  -After cleansing with mild unscented soap (such as Cetaphil, Cerave or Dove) and water,  -Apply small amount of VASHE on gauze, lay into wound bed, let sit for 15 minutes, remove gauze (do not rinse)  -Apply Triamcinolone Cream to red, irritated, itchy areas of your leg  -Sprinkle finely crushed flagyl tablets topically onto wound(s).   - Apply 1/10 of 4x5\" Ioplex to right anterior lower leg   -Place 1 4x4 sterile gauze to wound   -Secure with 1 roll of 4 \"Kerlix  -Secure with medipore tape 2\"  - Pull up Spandage # 7 or 8 from toes to knee  Pull up spandagrip size G from toes to knees  -Change every other day if drainage allows; otherwise change daily if soiled   Walk as much as you can, as you are able. Whenever you sit raise your ankle above your hips to promote wound healing.     Wound care to Right medial lower leg:  -Wash your hands with " "soap and water before you begin your dressing change and prepare a clean surface for dressings  -After cleansing with mild unscented soap (such as Cetaphil, Cerave or Dove) and water,  -Apply small amount of VASHE on gauze, lay into wound bed, let sit for 15 minutes, remove gauze (do not rinse)  -Apply Triamcinolone Cream to red, irritated, itchy areas of your leg  -Sprinkle finely crushed flagyl tablets topically onto wound(s).   - Apply 1/12th piece of 4x5 Ioplex  to right medial leg  -Place 1 4x4 gauze to wound   -Secure with 1 roll of 4 \"Kerlix  -Secure with medipore tape 2\"  - Pull up Spandage # 7 or 8 from toes to knee  Pull up spandagrip size G from toes to knees  -Change every other day if drainage allows; otherwise change daily if soiled   Walk as much as you can, as you are able. Whenever you sit raise your ankle above your hips to promote wound healing.    Left Leg:   -Keep leg clean and dry, Apply a ceramide based lotion (Cera-Ve, Vanicream, Cetaphil)  -EdemaWear should be worn 24/7 unless bathing/showering or changing the dressing.  You will wash and reuse the EdemaWear. DO NOT CUT THE EDEMAWEAR. IF IT IS TOO LONG THEN CUFF THE EDEMAWEAR (the EdemaWear can shrink length wise with washing).    Dressing changes outside of clinic are being performed by Patient     Lars Torres M.D. August 9, 2023    Call us at [phone] if you have any questions about your wounds, have redness or swelling around your wound, have a fever of 101 degrees Fahrenheit or greater or if you have any other problems or concerns. We answer the phone Monday through Friday 8 am to 4 pm, please leave a message as we check the voicemail frequently throughout the day.     If you had a positive experience please indicate that on your patient satisfaction survey form that [facility] will be sending you. It was a pleasure meeting with you today.  Thank you for allowing me and my team the privilege of caring for you today.  " YOU are the reason we are here, and I truly hope we provided you with the excellent service you deserve.  Please let us know if there is anything else we can do for you so that we can be sure you are leaving completely satisfied with your care experience.      If you have any billing related questions please call the [facility] Business office at [phone]. The clinic staff does not handle billing related matters. If you are scheduled to have a follow up appointment, you will receive a reminder call the day before your visit. On the appointment day please arrive 15 minutes prior to your appointment time. If you are unable to keep that appointment, please call the clinic to cancel or reschedule. If you are more than 10 minutes late or greater for your scheduled appointment time, the clinic policy is that you may be asked to reschedule.         ,

## 2023-08-09 NOTE — DISCHARGE INSTRUCTIONS
"Elizabeth Kelley      1945  A DME order for supplies has been placed to [facility]. If there are any issues with your order including not receiving the order please call [facility] at [phone] option 1. They can also provide a tracking number for you if you had supplies shipped to you.    [facility] Phone: [phone] Dr Velázquez please call for appointment  [address], [address] [place] [address]    PLAN:  -Continue using your new [employer] pump! -- 1hour a day for a week, try to increase each week   Continue to work with lymphedema therapist once per week  Please raise your legs above your hips for 30 mins 2 times a day to promote wound healing.  Walk as much as you can. When you sit raise your ankle above your hips to promote wound healing.     Diet: A diet high in protein: shakes, bars, eggs, peanut butter, cottage cheese       Wound Care recommendations to Right Anterior Lower Leg   -Wash your hands with soap and water before you begin your dressing change and prepare a clean surface for dressings  -After cleansing with mild unscented soap (such as Cetaphil, Cerave or Dove) and water,  -Apply small amount of VASHE on gauze, lay into wound bed, let sit for 15 minutes, remove gauze (do not rinse)  -Apply Triamcinolone Cream to red, irritated, itchy areas of your leg  -Sprinkle finely crushed flagyl tablets topically onto wound(s).   - Apply 1/10 of 4x5\" Ioplex to right anterior lower leg   -Place 1 4x4 sterile gauze to wound   -Secure with 1 roll of 4 \"Kerlix  -Secure with medipore tape 2\"  - Pull up Spandage # 7 or 8 from toes to knee  Pull up spandagrip size G from toes to knees  -Change every other day if drainage allows; otherwise change daily if soiled   Walk as much as you can, as you are able. Whenever you sit raise your ankle above your hips to promote wound healing.     Wound care to Right medial lower leg:  -Wash your hands with soap and water before " "you begin your dressing change and prepare a clean surface for dressings  -After cleansing with mild unscented soap (such as Cetaphil, Cerave or Dove) and water,  -Apply small amount of VASHE on gauze, lay into wound bed, let sit for 15 minutes, remove gauze (do not rinse)  -Apply Triamcinolone Cream to red, irritated, itchy areas of your leg  -Sprinkle finely crushed flagyl tablets topically onto wound(s).   - Apply 1/12th piece of 4x5 Ioplex  to right medial leg  -Place 1 4x4 gauze to wound   -Secure with 1 roll of 4 \"Kerlix  -Secure with medipore tape 2\"  - Pull up Spandage # 7 or 8 from toes to knee  Pull up spandagrip size G from toes to knees  -Change every other day if drainage allows; otherwise change daily if soiled   Walk as much as you can, as you are able. Whenever you sit raise your ankle above your hips to promote wound healing.    Left Leg:   -Keep leg clean and dry, Apply a ceramide based lotion (Cera-Ve, Vanicream, Cetaphil)  -EdemaWear should be worn 24/7 unless bathing/showering or changing the dressing.  You will wash and reuse the EdemaWear. DO NOT CUT THE EDEMAWEAR. IF IT IS TOO LONG THEN CUFF THE EDEMAWEAR (the EdemaWear can shrink length wise with washing).    Dressing changes outside of clinic are being performed by Patient     Lars Torres M.D. August 9, 2023    Call us at [phone] if you have any questions about your wounds, have redness or swelling around your wound, have a fever of 101 degrees Fahrenheit or greater or if you have any other problems or concerns. We answer the phone Monday through Friday 8 am to 4 pm, please leave a message as we check the voicemail frequently throughout the day.     If you had a positive experience please indicate that on your patient satisfaction survey form that [facility] will be sending you. It was a pleasure meeting with you today.  Thank you for allowing me and my team the privilege of caring for you today.  YOU are the reason we " are here, and I truly hope we provided you with the excellent service you deserve.  Please let us know if there is anything else we can do for you so that we can be sure you are leaving completely satisfied with your care experience.      If you have any billing related questions please call the [facility] Business office at [phone]. The clinic staff does not handle billing related matters. If you are scheduled to have a follow up appointment, you will receive a reminder call the day before your visit. On the appointment day please arrive 15 minutes prior to your appointment time. If you are unable to keep that appointment, please call the clinic to cancel or reschedule. If you are more than 10 minutes late or greater for your scheduled appointment time, the clinic policy is that you may be asked to reschedule.

## 2023-08-09 NOTE — TELEPHONE ENCOUNTER
A referral was placed for patient by Dr. Torres for pt to see Dr. Castillo. Called and left voicemail message for patient to call back if she is interested in an appointment.

## 2023-08-10 ENCOUNTER — THERAPY VISIT (OUTPATIENT)
Dept: PHYSICAL THERAPY | Facility: REHABILITATION | Age: 78
End: 2023-08-10
Payer: COMMERCIAL

## 2023-08-10 DIAGNOSIS — I87.303 VENOUS HYPERTENSION OF LOWER EXTREMITY, BILATERAL: ICD-10-CM

## 2023-08-10 DIAGNOSIS — R26.81 UNSTEADINESS ON FEET: ICD-10-CM

## 2023-08-10 DIAGNOSIS — I89.0 ACQUIRED LYMPHEDEMA OF LEG: ICD-10-CM

## 2023-08-10 DIAGNOSIS — M62.81 GENERALIZED MUSCLE WEAKNESS: Primary | ICD-10-CM

## 2023-08-10 PROCEDURE — 97140 MANUAL THERAPY 1/> REGIONS: CPT | Mod: GP | Performed by: PHYSICAL THERAPIST

## 2023-08-31 NOTE — PLAN OF CARE
PT: Orders received, chart reviewed pt on Bipap overnight, currently on RA with HILLMAN and wheezy at rest, Hgb 6.1 with lethargy. Will allow for a day of medical management prior to PT evaluation to allow for more accurate discharge planning. Will continue to follow.        until cleared by Dr Quintanilla

## 2023-09-19 ENCOUNTER — HOSPITAL ENCOUNTER (OUTPATIENT)
Dept: WOUND CARE | Facility: CLINIC | Age: 78
Discharge: HOME | End: 2023-09-19
Attending: FAMILY MEDICINE | Admitting: FAMILY MEDICINE
Payer: COMMERCIAL

## 2023-09-19 VITALS — SYSTOLIC BLOOD PRESSURE: 156 MMHG | TEMPERATURE: 98.2 F | DIASTOLIC BLOOD PRESSURE: 85 MMHG | HEART RATE: 71 BPM

## 2023-09-19 DIAGNOSIS — L97.912 ULCER OF RIGHT LOWER EXTREMITY WITH FAT LAYER EXPOSED (H): Primary | ICD-10-CM

## 2023-09-19 PROBLEM — L03.115 CELLULITIS OF RIGHT LOWER EXTREMITY: Status: RESOLVED | Noted: 2021-07-21 | Resolved: 2023-09-19

## 2023-09-19 PROBLEM — T14.90XA INJURY, UNSPECIFIED, INITIAL ENCOUNTER: Status: RESOLVED | Noted: 2022-04-29 | Resolved: 2023-09-19

## 2023-09-19 PROCEDURE — 99214 OFFICE O/P EST MOD 30 MIN: CPT | Mod: 25 | Performed by: FAMILY MEDICINE

## 2023-09-19 PROCEDURE — 11042 DBRDMT SUBQ TIS 1ST 20SQCM/<: CPT | Performed by: FAMILY MEDICINE

## 2023-09-19 NOTE — PROGRESS NOTES
Wound Clinic Note         Visit date: 09/19/2023       Cheif Complaint:     Elizabeth Kelley is a 78 year old   female had concerns including WOUND CARE.  The patient has lower extremity edema and right leg ulcers.          HISTORY OF PRESENT ILLNESS:    Elizabeth Kelley reports the wound has been present since 2020.  The wound began due to the area being bumped.   She has chronic lymphedema and works with the lymphedema clinic once a week.  The patient denies a history of congestive heart failure, previous vein procedures or previous DVT.       Since her last clinic visit with me she has been changing the bandages beginning with a Vashe soak followed by Flagyl powder, Ioplex, dry dressing and a Spandage stocking all changed every other day.  There is been light serous drainage from the wounds.  There has been no difficulties with the dressing changes.      The pateint denies fevers or chills.  They report the pain from the wound has been 0/10 and has remained about the same recently.      The patient reports propping up their legs occasionally during the day, but they do not elevate their legs above the level of their heart.     She does have pneumatic lymphedema pumps which she uses once a day to help control her swelling.    She confirms she takes Lasix once a day to help control her swelling.    Today the patient reports maintaining a high protein diet, but has not been taking protein supplements lately.        The patient denies a history of diabetes, smoking or chronic steroid use.         The patient has not had any symptoms of infection relating to the wound recently and is not currently on antibiotics.       Problem List:   Past Medical History:   Diagnosis Date    Ankle contracture, left     Class 3 severe obesity due to excess calories without serious comorbidity with body mass index (BMI) of 45.0 to 49.9 in adult (H)     Combined gastric and duodenal ulcer     Controlled substance agreement broken      Depression     Dyslipidemia     Edema     Edema     Gait abnormality     GERD (gastroesophageal reflux disease)     Gout     Lymphedema of both lower extremities     Melanoma (H)     left upper arm    MS (multiple sclerosis) (H)     RLS (restless legs syndrome)     Skin ulcer of left lower leg (H)     Valgus deformity of both feet     Vitamin D deficiency              Family Hx: family history includes Arthritis in her maternal grandmother, maternal uncle, paternal grandfather, paternal grandmother, and sister; Asthma in her sister; Brain Cancer in her father; Breast Cancer in her paternal aunt; Colon Cancer in her paternal aunt; Early Death in her maternal grandfather; Hyperlipidemia in her mother, paternal aunt, and sister; Hypertension in her mother, paternal aunt, and sister; Multiple Sclerosis in her mother and sister; Obesity in her sister; Uterine Cancer in her mother.       Surgical Hx:   Past Surgical History:   Procedure Laterality Date    ABLATION SAPHENOUS VEIN W/ RFA Right 04/12/2021    Saphenous vein, anterior accessory saphenous vein, sclerotherapy of multiple veins.    COSMETIC SURGERY  1988    reduction of excess skin from weight loss    ESOPHAGOSCOPY, GASTROSCOPY, DUODENOSCOPY (EGD), COMBINED N/A 03/30/2015    Procedure: UPPER ENDOSCOPY with gastric biopsy;  Surgeon: Checo Fletcher MD;  Location: [facility];  Service:     IRRIGATION AND DEBRIDEMENT LOWER EXTREMITY, COMBINED Right 3/21/2022    Procedure: RIGHT LEG WOUND DEBRIDEMENT, PAULIE DERMATONE, MISONIX, VAC VERA FLOW WITH VASHE;  Surgeon: Gabriele Bullock MD;  Location:  OR    IRRIGATION AND DEBRIDEMENT LOWER EXTREMITY, COMBINED Right 3/28/2022    Procedure: DEBRIDEMENT AND WOUND DRESSING CHANGE AND MYRIAD APPLICATION OF RIGHT LOWER LEG WOUND;  Surgeon: Gabriele Bullock MD;  Location:  OR    MAMMOPLASTY REDUCTION Bilateral     MOHS MICROGRAPHIC PROCEDURE      left upper arm, melanoma    PICC SINGLE LUMEN PLACEMENT  8/13/2021          PICC SINGLE LUMEN PLACEMENT  9/2/2021         SPINE SURGERY      L5 area surgery, decompression          Allergies:    Allergies   Allergen Reactions    Other Environmental Allergy Anaphylaxis     Bees/Wasps Stings  Bees/Wasps Stings    Adhesive Tape Other (See Comments)     Red,itchy and oozes  Red,itchy and oozes    Adhesive [Cyanoacrylate] Unknown     Other reaction(s): sores    Oxycodone-Acetaminophen Rash    Vicodin [Hydrocodone-Acetaminophen] Nausea and Vomiting and Hives              Medication History:    Current Outpatient Medications   Medication Sig    acetaminophen (TYLENOL) 325 MG tablet 1-2 tablets as needed Orally every 4 hrs    ammonium lactate (AMLACTIN) 12 % external cream Apply topically daily to dry skin on feet/legs    ammonium lactate (AMLACTIN) 12 % external cream Apply topically daily Apply to bilateral feet/heels daily    Ascorbic Acid (VITAMIN C) 500 MG CHEW 1 tablet Orally Two times daily with meals    aspirin (ASPIRIN 81) 81 MG chewable tablet Take 1 tablet by mouth    aspirin 81 mg chewable tablet Take 81 mg by mouth every evening     atorvastatin (LIPITOR) 20 MG tablet Take 20 mg by mouth daily    benzonatate (TESSALON) 100 MG capsule Take 1 capsule (100 mg) by mouth 3 times daily as needed for cough    Bioflavonoid Products (CINDY-C) 500-550 MG TABS Take 1 tablet by mouth 2 times daily    buPROPion (WELLBUTRIN SR) 150 MG 12 hr tablet Take 150 mg by mouth every morning     calcium carbonate (TUMS) 500 MG chewable tablet Take 1 tablet (500 mg) by mouth daily as needed for heartburn    Carboxymethylcellulose Sodium 0.25 % SOLN Apply 0.05 mLs to eye    citalopram (CELEXA) 40 MG tablet Take 40 mg by mouth every morning     [employer] Super B Complex/C TABS Take 1 tablet by mouth daily    cyanocobalamin (VITAMIN B-12) 1000 MCG tablet Take 1 tablet (1,000 mcg) by mouth daily    cyclobenzaprine (FLEXERIL) 10 MG tablet Take 1 tablet (10 mg) by mouth every 8 hours as needed for muscle spasms     ferrous sulfate (FEROSUL) 325 (65 Fe) MG tablet Take 1 tablet (325 mg) by mouth daily    furosemide (LASIX) 40 MG tablet Take 1 tablet (40 mg) by mouth daily    gabapentin (NEURONTIN) 100 MG capsule TAKE 1 CAPSULE BY MOUTH 3 TIMES A DAY FOR 30 DAYS    gabapentin (NEURONTIN) 300 MG capsule TAKE 1 CAPSULE BY MOUTH THREE TIMES DAILY    gabapentin (NEURONTIN) 400 MG capsule Take 1 capsule (400 mg) by mouth 3 times daily    guaiFENesin (ROBITUSSIN) 20 mg/mL SOLN solution Take 10 mLs by mouth every 4 hours as needed for cough    HYDROmorphone (DILAUDID) 2 MG tablet Take 1 tablet (2 mg) by mouth every 4 hours as needed for moderate to severe pain    lidocaine (LIDODERM) 5 % patch 1 PATCH REMOVE AFTER 12 HOURS EXTERNALLY ONCE A DAY    magnesium oxide (MAG-OX) 400 MG tablet Take 1 tablet by mouth    metroNIDAZOLE (FLAGYL) 500 MG tablet Apply 1 tablet (500 mg) topically every other day    metroNIDAZOLE (FLAGYL) 500 MG tablet CRUSH 1 TABLET AND SPRINKLE ONTO THE WOUND EVERY DAY.    multivitamin w/minerals (CENTRUM ADULTS) tablet Take 1 tablet by mouth daily     nitroGLYcerin (NITROSTAT) 0.4 MG sublingual tablet PLACE 1 TABLET UNDER TONGUE EVERY 5 MINTUES AS NEEDED FOR CHEST PAIN FOR UP TO 30 DAYS    Nutritional Supplements (VARICOSE VEINS FORMULA OR) Take 1 tablet by mouth every morning    pantoprazole (PROTONIX) 40 MG EC tablet Take 1 tablet (40 mg) by mouth 2 times daily (before meals)    polyethylene glycol (MIRALAX) 17 GM/Dose powder 17 g    potassium chloride ER (KLOR-CON M) 20 MEQ CR tablet Take 20 mEq by mouth every evening (TAKE IN ADDITION TO AM/NOON DOSE FOR TOTAL 30mEq DAILY)    rOPINIRole (REQUIP) 0.5 MG tablet Take 1 tablet (0.5 mg) by mouth 2 times daily    rOPINIRole (REQUIP) 1 MG tablet Take 1 mg by mouth At Bedtime (TAKE IN ADDITION TO AM/NOON DOSE FOR TOTAL 2MG DAILY)    senna-docusate (SENNA S) 8.6-50 MG tablet Take 1 tablet by mouth as needed    simvastatin (ZOCOR) 40 MG tablet [SIMVASTATIN (ZOCOR) 40 MG  "TABLET] Take 40 mg by mouth bedtime.    Skin Protectants, Misc. (INTERDRY 10\"X36\") SHEE Externally apply 7 Units topically once as needed (change daily in groin rash/fold)    spironolactone (ALDACTONE) 25 MG tablet [SPIRONOLACTONE (ALDACTONE) 25 MG TABLET] Take 25 mg by mouth daily.    triamcinolone (ARISTOCORT HP) 0.5 % external cream Apply topically daily Apply to red/irritated skin around wound daily    triamcinolone (KENALOG) 0.1 % external cream Apply topically 2 times daily    triamcinolone (KENALOG) 0.1 % external cream Apply topically 2 times daily Apply to intact skin on right leg    triamcinolone (KENALOG) 0.1 % external ointment Apply topically every 48 hours To intact skin on right leg, as well as posterior calf on right leg redness    vitamin B-Complex Take 1 tablet by mouth daily    vitamin D3 (CHOLECALCIFEROL) 250 mcg (10162 units) capsule Take 1 capsule (250 mcg) by mouth daily    zinc 50 MG TABS Take 1 tablet by mouth     No current facility-administered medications for this encounter.         Tobacco History:  reports that she quit smoking about 47 years ago. Her smoking use included cigarettes. She has a 3.00 pack-year smoking history. She has never used smokeless tobacco.       REVIEW OF SYMPTOMS:   The review of systems was negative except as noted in the HPI.           PHYSICAL EXAMINATION:     BP (!) 160/111 (BP Location: Left arm, Patient Position: Sitting, Cuff Size: Adult Regular)   Pulse 78   Temp 98.2  F (36.8  C)            GENERAL: The patient overall appears well and is no acute distress.   HEAD: normocephalic   EYES: Sclera and conjunctiva clear   NECK: no obvious masses   LUNGS: breathing is unlabored.   EXTREMITIES: No clubbing, cyanosis or edema   SKIN: No rashes or other abnormalities except as noted under the Wound section below.   NEUROLOGICAL: normal motor and sensory function   EDEMA: Sever  and Lymphedema       WOUND: The wound appears healthy with no sign of infection. "   Wound bed: necrotic material  Periwound: healthy intact skin  Smaller wounds that she had previously have healed however the one larger wound is slightly larger compared with her last clinic visit.      Also see below for wound details:       Circumferential volume measures:            6/10/2021    11:00 AM 6/16/2021     1:00 PM 6/22/2021     1:00 PM 6/29/2021     2:00 PM 7/30/2021     1:00 PM   Circumferential Measures   Right just above MTP 25.5 23.6 24.8 24 23.3   Right Ankle 25.5 25.4 27 26 25.2   Right Widest Calf 61 56.6 58 59.5 61   Right Thigh Up 10cm 78    78.8   Left - just above MTP 23.5 22.6 23.8 23 24   Left Ankle 28 24.2 26.5 25.5 25   Left Widest Calf 54 53.6 54.6 53.5 51.5   Left Thigh Up 10cm 71   68.5 77.2       Ulceration(s)/Wound(s):   Please see the media tab under the chart review for pictures of the wounds.  Nursing staff removed dressings and cleansed wound.    Wound (used by OP I only) 03/10/22 0900 Right lower;anterior leg ulceration, venous (Active)   Thickness/Stage full thickness 09/19/23 1342   Base red;slough 09/19/23 1342   Periwound swelling;redness 09/19/23 1342   Periwound Temperature warm 09/19/23 1342   Periwound Skin Turgor soft 09/19/23 1342   Edges open 09/19/23 1342   Length (cm) 3 09/19/23 1342   Width (cm) 2.1 09/19/23 1342   Depth (cm) 0.3 09/19/23 1342   Wound (cm^2) 6.3 cm^2 09/19/23 1342   Wound Volume (cm^3) 1.89 cm^3 09/19/23 1342   Wound healing % 99.25 09/19/23 1342   Drainage Characteristics/Odor serosanguineous 09/19/23 1342   Drainage Amount moderate 09/19/23 1342   Care, Wound debrided 09/19/23 1342       Wound (used by OP [facility] only) 03/10/22 0931 Right lower;medial leg ulceration, venous (Active)   Thickness/Stage full thickness 09/19/23 1342   Base epithelialization 09/19/23 1342   Length (cm) 0 09/19/23 1342   Width (cm) 0 09/19/23 1342   Depth (cm) 0 09/19/23 1342   Wound (cm^2) 0 cm^2 09/19/23 1342   Wound Volume (cm^3) 0 cm^3 09/19/23 1342   Wound  healing % 100 09/19/23 1342   Drainage Amount none 09/19/23 1342       Wound (used by OP [facility] only) 03/10/22 0939 Right posterior;lower leg ulceration, venous (Active)   Thickness/Stage partial thickness 09/19/23 1342   Base epithelialization 09/19/23 1342   Length (cm) 0 09/19/23 1342   Width (cm) 0 09/19/23 1342   Depth (cm) 0 09/19/23 1342   Wound (cm^2) 0 cm^2 09/19/23 1342   Wound Volume (cm^3) 0 cm^3 09/19/23 1342   Wound healing % 100 09/19/23 1342   Drainage Amount none 09/19/23 1342       Wound (used by OP [facility] only) 07/11/23 1058 Right posterior;proximal;lower leg (Active)   Thickness/Stage partial thickness 09/19/23 1342   Base epithelialization 09/19/23 1342   Length (cm) 0 09/19/23 1342   Width (cm) 0 09/19/23 1342   Depth (cm) 0 09/19/23 1342   Wound (cm^2) 0 cm^2 09/19/23 1342   Wound Volume (cm^3) 0 cm^3 09/19/23 1342   Wound healing % 100 09/19/23 1342   Drainage Amount none 09/19/23 1342                 Recent Labs   Lab Test 04/04/22  0523 06/23/16  1130   A1C 5.4 5.9          Recent Labs   Lab Test 02/10/23  1535 05/25/22  1637 04/04/22  0523   ALBUMIN 4.3 4.1 1.9*      October 27, 2021 ankle-brachial indices: Normal  October 27, 2021 right lower extremity venous insufficiency ultrasound: Normal      Procedure note: , I had previous obtain informed consent from the patient to perform serial debridements, I confirmed this again with the patient today verbally. , Anesthetized as needed with lidocaine. , Using a curette and/or a scalpel I performed an excisional debridement removing all necrotic material at the Right leg wound down to the level of viable subcutaneous tissue.  I obtained hemostasis with direct pressure.  The patient tolerated the procedure well. , EBL: <5 ml , and Total debridement surface area: Less than 20 cm                  ASSESSMENT:   This is a 78 year old  female with lower extremity edema and right leg ulcers.           PLAN:   We will continue to bandage the open areas with a  Vashe soak followed by Flagyl powder, Hydrofera Blue, dry dressing and the Spandage and an edema wear stockings all changed once a day.    She is now scheduled to see Dr. Velázquez discussed treatment options for her superficial venous insufficiency on September 25, 2023.    Separate from the discussion of her wound care we then discussed the treatment of her lower extremity edema.  I have again explained to the patient today that controlling the edema is probably the most important thing we can do to help heal the wound.  I have specifically recommended that they lay down with their legs above the level of the heart for 30 minutes at least twice a day.  I emphasized that if we can not control the edema we will likely not be able to get this wound to heal.    I have strongly encouraged her to continue to use her pneumatic lymphedema pump once a day to help control her swelling.  I have encouraged the patient to continue on their high protein diet to aid in wound healing.   The patient will return to the wound clinic in 2-3 weeks to see me again.        30 minutes spent on the date of the encounter doing chart review, history and exam, documentation and further activities per the note      Lars Torres MD  09/19/2023   2:24 PM   [facility]  [phone]    This note was electronically signed by Lars Torres MD        Further instructions from your care team         Elizabeth Kelley      1945    A DME order was not completed because supplies were not needed has roll gauze, 4x4 gauze, tape, sent with remainder of HFB foam which should suffice for 15 more changes=30 days    Dressing changes outside of clinic are being performed by Patient    [facility] Phone: [phone] Dr Velázquez please call for appointment  [address], [address] [place] [id number] next week    PLAN:  -Continue using your new [employer] pump! -- 1hour a day for a week, try to  "increase each week  Continue to work with lymphedema therapist once per week  Please raise your legs above your hips for 30 mins 2 times a day to promote wound healing.  Walk as much as you can. When you sit raise your ankle above your hips to promote wound healing.  Diet: A diet high in protein: shakes, bars, eggs, peanut butter, cottage cheese    Wound Care recommendations to Right Anterior Lower Leg  -Wash your hands with soap and water before you begin your dressing change and prepare a clean surface for dressings  -After cleansing with mild unscented soap (such as Cetaphil, Cerave or Dove) and water,  -Apply small amount of VASHE on gauze, lay into wound bed, let sit for 15 minutes, remove gauze (do not rinse)  -Apply Triamcinolone Cream to red, irritated, itchy areas of your leg  -Sprinkle finely crushed flagyl tablets topically onto wound(s).  -Apply 1/16th of 4x5\" Hydrofera Blue Ready Transfer to right anterior lower leg, cut to fit size of wound, use as wound filler  -Place one 4x4 sterile gauze to wound  -Secure with 1/2 roll of 4 \"Kerlix  -Secure with medipore tape 2\"  -Then apply Yellow Stripe EdemaWear from toes to knee. EdemaWear should be worn 24/7 unless bathing/showering or changing the dressing. You will wash and reuse the EdemaWear. DO NOT CUT THE EDEMAWEAR. IF IT IS TOO LONG THEN CUFF THE EDEMAWEAR (the EdemaWear can shrink length wise with washing).   -Pull up spandagrip size G from toes to knees  -Change every other day if drainage allows; otherwise change if soiled/saturated    Walk as much as you can, as you are able. Whenever you sit raise your ankle above your hips to promote wound healing.    Left Leg:  -Keep leg clean and dry, Apply a ceramide based lotion (Cera-Ve, Vanicream, Cetaphil)  -navy stripe EdemaWear should be worn 24/7 unless bathing/showering or changing the dressing.  You will wash and reuse the EdemaWear. DO NOT CUT THE EDEMAWEAR. IF IT IS TOO  LONG THEN CUFF THE EDEMAWEAR " (the EdemaWear can shrink length wise with  washing).    Compression:   Your compression is Spandigrip G and can be removed at night and put back on first thing in the morning.   Please remove compression dressing if toes turn blue and/or tingle and can not be relieved by raising the leg for one hour. If unable to reapply in the morning, keep compression on until next dressing change.    Walk as much as you can, as you are able. Whenever you sit raise your ankle above your hips to promote wound healing.         Lars Torres M.D. September 19, 2023    Call us at [phone] if you have any questions about your wounds, have redness or swelling around your wound, have a fever of 101 degrees Fahrenheit or greater or if you have any other problems or concerns. We answer the phone Monday through Friday 8 am to 4 pm, please leave a message as we check the voicemail frequently throughout the day.     If you had a positive experience please indicate that on your patient satisfaction survey form that [facility] will be sending you.    It was a pleasure meeting with you today.  Thank you for allowing me and my team the privilege of caring for you today.  YOU are the reason we are here, and I truly hope we provided you with the excellent service you deserve.  Please let us know if there is anything else we can do for you so that we can be sure you are leaving completely satisfied with your care experience.      If you have any billing related questions please call the [facility] Business office at [phone]. The clinic staff does not handle billing related matters.    If you are scheduled to have a follow up appointment, you will receive a reminder call the day before your visit. On the appointment day please arrive 15 minutes prior to your appointment time. If you are unable to keep that appointment, please call the clinic to cancel or reschedule. If you are more than 10 minutes late or greater for your  scheduled appointment time, the clinic policy is that you may be asked to reschedule.        ,

## 2023-09-19 NOTE — DISCHARGE INSTRUCTIONS
"Elizabeth JODY Kelley      1945    A DME order was not completed because supplies were not needed has roll gauze, 4x4 gauze, tape, sent with remainder of HFB foam which should suffice for 15 more changes=30 days    Dressing changes outside of clinic are being performed by Patient    [facility] Phone: [phone] Dr Velázquez please call for appointment  [address], [address] [place] [address] next week    PLAN:  -Continue using your new [employer] pump! -- 1hour a day for a week, try to increase each week  Continue to work with lymphedema therapist once per week  Please raise your legs above your hips for 30 mins 2 times a day to promote wound healing.  Walk as much as you can. When you sit raise your ankle above your hips to promote wound healing.  Diet: A diet high in protein: shakes, bars, eggs, peanut butter, cottage cheese    Wound Care recommendations to Right Anterior Lower Leg  -Wash your hands with soap and water before you begin your dressing change and prepare a clean surface for dressings  -After cleansing with mild unscented soap (such as Cetaphil, Cerave or Dove) and water,  -Apply small amount of VASHE on gauze, lay into wound bed, let sit for 15 minutes, remove gauze (do not rinse)  -Apply Triamcinolone Cream to red, irritated, itchy areas of your leg  -Sprinkle finely crushed flagyl tablets topically onto wound(s).  -Apply 1/16th of 4x5\" Hydrofera Blue Ready Transfer to right anterior lower leg, cut to fit size of wound, use as wound filler  -Place one 4x4 sterile gauze to wound  -Secure with 1/2 roll of 4 \"Kerlix  -Secure with medipore tape 2\"  -Then apply Yellow Stripe EdemaWear from toes to knee. EdemaWear should be worn 24/7 unless bathing/showering or changing the dressing. You will wash and reuse the EdemaWear. DO NOT CUT THE EDEMAWEAR. IF IT IS TOO LONG THEN CUFF THE EDEMAWEAR (the EdemaWear can shrink length wise with washing).   -Pull up spandagrip size G from " toes to knees  -Change every other day if drainage allows; otherwise change if soiled/saturated    Walk as much as you can, as you are able. Whenever you sit raise your ankle above your hips to promote wound healing.    Left Leg:  -Keep leg clean and dry, Apply a ceramide based lotion (Cera-Ve, Vanicream, Cetaphil)  -navy stripe EdemaWear should be worn 24/7 unless bathing/showering or changing the dressing.  You will wash and reuse the EdemaWear. DO NOT CUT THE EDEMAWEAR. IF IT IS TOO  LONG THEN CUFF THE EDEMAWEAR (the EdemaWear can shrink length wise with  washing).    Compression:   Your compression is Spandigrip G and can be removed at night and put back on first thing in the morning.   Please remove compression dressing if toes turn blue and/or tingle and can not be relieved by raising the leg for one hour. If unable to reapply in the morning, keep compression on until next dressing change.    Walk as much as you can, as you are able. Whenever you sit raise your ankle above your hips to promote wound healing.         Lars Torres M.D. September 19, 2023    Call us at [phone] if you have any questions about your wounds, have redness or swelling around your wound, have a fever of 101 degrees Fahrenheit or greater or if you have any other problems or concerns. We answer the phone Monday through Friday 8 am to 4 pm, please leave a message as we check the voicemail frequently throughout the day.     If you had a positive experience please indicate that on your patient satisfaction survey form that [facility] will be sending you.    It was a pleasure meeting with you today.  Thank you for allowing me and my team the privilege of caring for you today.  YOU are the reason we are here, and I truly hope we provided you with the excellent service you deserve.  Please let us know if there is anything else we can do for you so that we can be sure you are leaving completely satisfied with your care  experience.      If you have any billing related questions please call the [facility] Business office at [phone]. The clinic staff does not handle billing related matters.    If you are scheduled to have a follow up appointment, you will receive a reminder call the day before your visit. On the appointment day please arrive 15 minutes prior to your appointment time. If you are unable to keep that appointment, please call the clinic to cancel or reschedule. If you are more than 10 minutes late or greater for your scheduled appointment time, the clinic policy is that you may be asked to reschedule.

## 2023-11-01 ENCOUNTER — HOSPITAL ENCOUNTER (OUTPATIENT)
Dept: WOUND CARE | Facility: CLINIC | Age: 78
Discharge: HOME | End: 2023-11-01
Attending: FAMILY MEDICINE | Admitting: FAMILY MEDICINE
Payer: COMMERCIAL

## 2023-11-01 VITALS — DIASTOLIC BLOOD PRESSURE: 65 MMHG | TEMPERATURE: 97.2 F | SYSTOLIC BLOOD PRESSURE: 144 MMHG | HEART RATE: 76 BPM

## 2023-11-01 DIAGNOSIS — L97.912 ULCER OF RIGHT LOWER EXTREMITY WITH FAT LAYER EXPOSED (H): Primary | ICD-10-CM

## 2023-11-01 DIAGNOSIS — R60.0 LOCALIZED EDEMA: ICD-10-CM

## 2023-11-01 PROCEDURE — 99214 OFFICE O/P EST MOD 30 MIN: CPT | Mod: 25 | Performed by: FAMILY MEDICINE

## 2023-11-01 PROCEDURE — 11042 DBRDMT SUBQ TIS 1ST 20SQCM/<: CPT | Performed by: FAMILY MEDICINE

## 2023-11-01 NOTE — PROGRESS NOTES
Wound Clinic Note         Visit date: 11/01/2023       Cheif Complaint:     Elizabeth Kelley is a 78 year old   female had concerns including WOUND CARE.  The patient has lower extremity edema and right leg ulcers.          HISTORY OF PRESENT ILLNESS:    Elizabeth Kelley reports the wound has been present since 2020.  The wound began due to the area being bumped.   She has chronic lymphedema and works with the lymphedema clinic once a week.  The patient denies a history of congestive heart failure, previous vein procedures or previous DVT.       I last saw this patient in the wound clinic on September 19, 2023.    Since her last clinic visit with me she has been changing the bandages beginning with a Vashe soak followed by Flagyl powder, Ioplex, dry dressing and a Spandage stocking all changed every other day.  There is been light serous drainage from the wounds.  There has been no difficulties with the dressing changes.  At her last clinic visit I had suggested that we switch to Hydrofera Blue and she reports that she use that bandage for most of the time since her last clinic visit with me until she ran out of the Hydrofera Blue over the last week and switch back to the Ioplex.      The pateint denies fevers or chills.  They report the pain from the wound has been 0/10 and has remained about the same recently.      The patient reports laying down to elevate their legs above the level of their heart at least twice a day.     She does have pneumatic lymphedema pumps which she uses once a day to help control her swelling.    She is scheduled to see Dr. Floyd to discuss treatment options for her superficial venous insufficiency on November 13, 2023.    She confirms she takes Lasix once a day to help control her swelling.    Today the patient reports maintaining a high protein diet, but has not been taking protein supplements lately.        The patient denies a history of diabetes, smoking or chronic steroid use.          The patient has not had any symptoms of infection relating to the wound recently and is not currently on antibiotics.       Problem List:   Past Medical History:   Diagnosis Date    Ankle contracture, left     Class 3 severe obesity due to excess calories without serious comorbidity with body mass index (BMI) of 45.0 to 49.9 in adult (H)     Combined gastric and duodenal ulcer     Controlled substance agreement broken     Depression     Dyslipidemia     Edema     Edema     Gait abnormality     GERD (gastroesophageal reflux disease)     Gout     Lymphedema of both lower extremities     Melanoma (H)     left upper arm    MS (multiple sclerosis) (H)     RLS (restless legs syndrome)     Skin ulcer of left lower leg (H)     Valgus deformity of both feet     Vitamin D deficiency              Family Hx: family history includes Arthritis in her maternal grandmother, maternal uncle, paternal grandfather, paternal grandmother, and sister; Asthma in her sister; Brain Cancer in her father; Breast Cancer in her paternal aunt; Colon Cancer in her paternal aunt; Early Death in her maternal grandfather; Hyperlipidemia in her mother, paternal aunt, and sister; Hypertension in her mother, paternal aunt, and sister; Multiple Sclerosis in her mother and sister; Obesity in her sister; Uterine Cancer in her mother.       Surgical Hx:   Past Surgical History:   Procedure Laterality Date    ABLATION SAPHENOUS VEIN W/ RFA Right 04/12/2021    Saphenous vein, anterior accessory saphenous vein, sclerotherapy of multiple veins.    COSMETIC SURGERY  1988    reduction of excess skin from weight loss    ESOPHAGOSCOPY, GASTROSCOPY, DUODENOSCOPY (EGD), COMBINED N/A 03/30/2015    Procedure: UPPER ENDOSCOPY with gastric biopsy;  Surgeon: Checo Fletcher MD;  Location: [facility];  Service:     IRRIGATION AND DEBRIDEMENT LOWER EXTREMITY, COMBINED Right 3/21/2022    Procedure: RIGHT LEG WOUND DEBRIDEMENT, PAULIE DERMATONE, MISONIX, VAC  VERA FLOW WITH VASHE;  Surgeon: Gabriele Bullock MD;  Location: [facility] OR    IRRIGATION AND DEBRIDEMENT LOWER EXTREMITY, COMBINED Right 3/28/2022    Procedure: DEBRIDEMENT AND WOUND DRESSING CHANGE AND MYRIAD APPLICATION OF RIGHT LOWER LEG WOUND;  Surgeon: Gabriele Bullock MD;  Location: [facility] OR    MAMMOPLASTY REDUCTION Bilateral     MOHS MICROGRAPHIC PROCEDURE      left upper arm, melanoma    PICC SINGLE LUMEN PLACEMENT  8/13/2021         PICC SINGLE LUMEN PLACEMENT  9/2/2021         SPINE SURGERY      L5 area surgery, decompression          Allergies:    Allergies   Allergen Reactions    Other Environmental Allergy Anaphylaxis     Bees/Wasps Stings  Bees/Wasps Stings    Adhesive Tape Other (See Comments)     Red,itchy and oozes  Red,itchy and oozes    Adhesive [Cyanoacrylate] Unknown     Other reaction(s): sores    Oxycodone-Acetaminophen Rash    Vicodin [Hydrocodone-Acetaminophen] Nausea and Vomiting and Hives              Medication History:    Current Outpatient Medications   Medication Sig    acetaminophen (TYLENOL) 325 MG tablet 1-2 tablets as needed Orally every 4 hrs    ammonium lactate (AMLACTIN) 12 % external cream Apply topically daily to dry skin on feet/legs    ammonium lactate (AMLACTIN) 12 % external cream Apply topically daily Apply to bilateral feet/heels daily    Ascorbic Acid (VITAMIN C) 500 MG CHEW 1 tablet Orally Two times daily with meals    aspirin (ASPIRIN 81) 81 MG chewable tablet Take 1 tablet by mouth    aspirin 81 mg chewable tablet Take 81 mg by mouth every evening     atorvastatin (LIPITOR) 20 MG tablet Take 20 mg by mouth daily    benzonatate (TESSALON) 100 MG capsule Take 1 capsule (100 mg) by mouth 3 times daily as needed for cough    Bioflavonoid Products (CINDY-C) 500-550 MG TABS Take 1 tablet by mouth 2 times daily    buPROPion (WELLBUTRIN SR) 150 MG 12 hr tablet Take 150 mg by mouth every morning     calcium carbonate (TUMS) 500 MG chewable tablet Take 1 tablet (500 mg) by mouth  daily as needed for heartburn    Carboxymethylcellulose Sodium 0.25 % SOLN Apply 0.05 mLs to eye    citalopram (CELEXA) 40 MG tablet Take 40 mg by mouth every morning     [employer] Super B Complex/C TABS Take 1 tablet by mouth daily    cyanocobalamin (VITAMIN B-12) 1000 MCG tablet Take 1 tablet (1,000 mcg) by mouth daily    cyclobenzaprine (FLEXERIL) 10 MG tablet Take 1 tablet (10 mg) by mouth every 8 hours as needed for muscle spasms    ferrous sulfate (FEROSUL) 325 (65 Fe) MG tablet Take 1 tablet (325 mg) by mouth daily    furosemide (LASIX) 40 MG tablet Take 1 tablet (40 mg) by mouth daily    gabapentin (NEURONTIN) 100 MG capsule TAKE 1 CAPSULE BY MOUTH 3 TIMES A DAY FOR 30 DAYS    gabapentin (NEURONTIN) 300 MG capsule TAKE 1 CAPSULE BY MOUTH THREE TIMES DAILY    gabapentin (NEURONTIN) 400 MG capsule Take 1 capsule (400 mg) by mouth 3 times daily    guaiFENesin (ROBITUSSIN) 20 mg/mL SOLN solution Take 10 mLs by mouth every 4 hours as needed for cough    HYDROmorphone (DILAUDID) 2 MG tablet Take 1 tablet (2 mg) by mouth every 4 hours as needed for moderate to severe pain    lidocaine (LIDODERM) 5 % patch 1 PATCH REMOVE AFTER 12 HOURS EXTERNALLY ONCE A DAY    magnesium oxide (MAG-OX) 400 MG tablet Take 1 tablet by mouth    metroNIDAZOLE (FLAGYL) 500 MG tablet Apply 1 tablet (500 mg) topically every other day    metroNIDAZOLE (FLAGYL) 500 MG tablet CRUSH 1 TABLET AND SPRINKLE ONTO THE WOUND EVERY DAY.    multivitamin w/minerals (CENTRUM ADULTS) tablet Take 1 tablet by mouth daily     nitroGLYcerin (NITROSTAT) 0.4 MG sublingual tablet PLACE 1 TABLET UNDER TONGUE EVERY 5 MINTUES AS NEEDED FOR CHEST PAIN FOR UP TO 30 DAYS    Nutritional Supplements (VARICOSE VEINS FORMULA OR) Take 1 tablet by mouth every morning    pantoprazole (PROTONIX) 40 MG EC tablet Take 1 tablet (40 mg) by mouth 2 times daily (before meals)    polyethylene glycol (MIRALAX) 17 GM/Dose powder 17 g    potassium chloride ER (KLOR-CON M) 20 MEQ CR tablet  "Take 20 mEq by mouth every evening (TAKE IN ADDITION TO AM/NOON DOSE FOR TOTAL 30mEq DAILY)    rOPINIRole (REQUIP) 0.5 MG tablet Take 1 tablet (0.5 mg) by mouth 2 times daily    rOPINIRole (REQUIP) 1 MG tablet Take 1 mg by mouth At Bedtime (TAKE IN ADDITION TO AM/NOON DOSE FOR TOTAL 2MG DAILY)    senna-docusate (SENNA S) 8.6-50 MG tablet Take 1 tablet by mouth as needed    simvastatin (ZOCOR) 40 MG tablet [SIMVASTATIN (ZOCOR) 40 MG TABLET] Take 40 mg by mouth bedtime.    Skin Protectants, Misc. (INTERDRY 10\"X36\") SHEE Externally apply 7 Units topically once as needed (change daily in groin rash/fold)    spironolactone (ALDACTONE) 25 MG tablet [SPIRONOLACTONE (ALDACTONE) 25 MG TABLET] Take 25 mg by mouth daily.    triamcinolone (ARISTOCORT HP) 0.5 % external cream Apply topically daily Apply to red/irritated skin around wound daily    triamcinolone (KENALOG) 0.1 % external cream Apply topically 2 times daily    triamcinolone (KENALOG) 0.1 % external cream Apply topically 2 times daily Apply to intact skin on right leg    triamcinolone (KENALOG) 0.1 % external ointment Apply topically every 48 hours To intact skin on right leg, as well as posterior calf on right leg redness    vitamin B-Complex Take 1 tablet by mouth daily    vitamin D3 (CHOLECALCIFEROL) 250 mcg (38086 units) capsule Take 1 capsule (250 mcg) by mouth daily    zinc 50 MG TABS Take 1 tablet by mouth     No current facility-administered medications for this encounter.         Tobacco History:  reports that she quit smoking about 47 years ago. Her smoking use included cigarettes. She has a 3.00 pack-year smoking history. She has never used smokeless tobacco.       REVIEW OF SYMPTOMS:   The review of systems was negative except as noted in the HPI.           PHYSICAL EXAMINATION:     BP (!) 144/65 (BP Location: Left arm, Patient Position: Sitting)   Pulse 76   Temp 97.2  F (36.2  C) (Temporal)            GENERAL: The patient overall appears well and is " no acute distress.   HEAD: normocephalic   EYES: Sclera and conjunctiva clear   NECK: no obvious masses   LUNGS: breathing is unlabored.   EXTREMITIES: No clubbing, cyanosis or edema   SKIN: No rashes or other abnormalities except as noted under the Wound section below.   NEUROLOGICAL: normal motor and sensory function   EDEMA: Sever  and Lymphedema       WOUND: The wound appears healthy with no sign of infection.   Wound bed: necrotic material  Periwound: healthy intact skin  Overall the wound measurements are about the same compared with her last clinic visit.      Also see below for wound details:       Circumferential volume measures:            6/10/2021    11:00 AM 6/16/2021     1:00 PM 6/22/2021     1:00 PM 6/29/2021     2:00 PM 7/30/2021     1:00 PM   Circumferential Measures   Right just above MTP 25.5 23.6 24.8 24 23.3   Right Ankle 25.5 25.4 27 26 25.2   Right Widest Calf 61 56.6 58 59.5 61   Right Thigh Up 10cm 78    78.8   Left - just above MTP 23.5 22.6 23.8 23 24   Left Ankle 28 24.2 26.5 25.5 25   Left Widest Calf 54 53.6 54.6 53.5 51.5   Left Thigh Up 10cm 71   68.5 77.2       Ulceration(s)/Wound(s):   Please see the media tab under the chart review for pictures of the wounds.  Nursing staff removed dressings and cleansed wound.    Wound (used by OP [facility] only) 03/10/22 0900 Right lower;anterior leg ulceration, venous (Active)   Thickness/Stage full thickness 11/01/23 1443   Base red;slough;necrotic 11/01/23 1443   Periwound swelling;redness 11/01/23 1443   Periwound Temperature warm 11/01/23 1443   Periwound Skin Turgor soft 11/01/23 1443   Edges open 11/01/23 1443   Length (cm) 3.1 11/01/23 1443   Width (cm) 2 11/01/23 1443   Depth (cm) 0.2 11/01/23 1443   Wound (cm^2) 6.2 cm^2 11/01/23 1443   Wound Volume (cm^3) 1.24 cm^3 11/01/23 1443   Wound healing % 99.26 11/01/23 1443   Drainage Characteristics/Odor serosanguineous 11/01/23 1443   Drainage Amount moderate 11/01/23 1443   Care, Wound debrided  11/01/23 1443                   Recent Labs   Lab Test 04/04/22  0523 06/23/16  1130   A1C 5.4 5.9          Recent Labs   Lab Test 02/10/23  1535 05/25/22  1637 04/04/22  0523   ALBUMIN 4.3 4.1 1.9*      October 27, 2021 ankle-brachial indices: Normal  October 27, 2021 right lower extremity venous insufficiency ultrasound: Normal      Procedure note:  I had previous obtain informed consent from the patient to perform serial debridements, I confirmed this again with the patient today verbally.  Anesthetized as needed with lidocaine.  Using a curette and/or a scalpel I performed an excisional debridement removing all necrotic material at the Right leg wound in to the level of viable subcutaneous tissue.  I obtained hemostasis with direct pressure.  The patient tolerated the procedure well.  EBL: <5 ml  Total debridement surface area: Less than 20 cm                  ASSESSMENT:   This is a 78 year old  female with lower extremity edema and right leg ulcers.           PLAN:   We will continue to bandage the open areas with a Vashe soak followed by Flagyl powder, Fibracol, a dry dressing and the Spandage and an edema wear stockings all changed once a day.    She is now scheduled to see Dr. Floyd discussed treatment options for her superficial venous insufficiency on November 13, 2023.    Separate from the discussion of her wound care we then discussed the treatment of her lower extremity edema.  I have encouraged the patient to continue to elevate the legs as they have been doing, including laying down with their legs above the level of the heart for 30 minutes at least twice a day.    I have strongly encouraged her to continue to use her pneumatic lymphedema pump once a day to help control her swelling.  I have encouraged the patient to continue on their high protein diet to aid in wound healing.   The patient will return to the wound clinic in 2-3 weeks to see me again.        30 minutes spent on the date of the  encounter doing chart review, history and exam, documentation and further activities per the note      Lars Torres MD  11/01/2023   3:26 PM   [facility] Vascular/Wound  [phone]    This note was electronically signed by Lars Torres MD        Further instructions from your care team         Elizabeth Pastranaamanda      1945    A DME order for supplies has been placed to [facility], [address]. If there are any issues with your order including not receiving the order please call [facility] at [phone] option 1. They can also provide a tracking number for you if you had supplies shipped to you.    Dressing changes outside of clinic are being performed by Patient    Vascular [facility] Phone: [phone] Dr Velázquez please call for appointment  [address] [facility], [address] [place] [address] next week    PLAN:  -Keep appointment with Dr. Floyd on 11/13  -Continue using your new [employer] pump! -- 1hour a day for a week, try to  increase each week  -Continue to work with lymphedema therapist once per week  -Please raise your legs above your hips for 30 mins 2 times a day to promotewound healing.  -Walk as much as you can. When you sit raise your ankle above your hips to  -promote wound healing.  -Diet: A diet high in protein: shakes, bars, eggs, peanut butter, cottage cheese      Wound Care recommendations to Right Anterior Lower Leg  -Wash your hands with soap and water before you begin your dressing change and  prepare a clean surface for dressings  -After cleansing with mild unscented soap (such as Cetaphil, Cerave or Dove) and water,  -Apply small amount of VASHE on gauze, lay into wound bed, let sit for 15 minutes,  remove gauze (do not rinse)  -Apply Triamcinolone Cream to red, irritated, itchy areas of your leg  -Apply 1/6th piece of Fibrocol to wound bed  -Apply 1 4x4 mepilex border dressing  -Then apply Red Stripe EdemaWear from toes to knee.  EdemaWear should be  worn 24/7 unless bathing/showering or changing the dressing. You will wash and  reuse the EdemaWear. DO NOT CUT THE EDEMAWEAR. IF IT IS TOO LONG THEN CUFF  THE EDEMAWEAR (the EdemaWear can shrink length wise with washing).  -Pull up spandagrip size F from toes to mid calf then pull up spandagrip size G from calf to behind the knee  -Change every other day if drainage allows; otherwise change if soiled/saturated    Walk as much as you can, as you are able. Whenever you sit raise your ankle  above your hips to promote wound healing.        Left Leg:  -Keep leg clean and dry, Apply a ceramide based lotion (Cera-Ve, Vanicream, Cetaphil)  -navy stripe EdemaWear should be worn 24/7 unless bathing/showering or changing  the dressing.  You will wash and reuse the EdemaWear. DO NOT CUT THE EDEMAWEAR. IF IT IS TOO  LONG THEN CUFF THE EDEMAWEAR (the EdemaWear can shrink length wise with  washing).    Compression:  Your compression is Spandigrip G and can be removed at night and put back on first  thing in the morning.  Please remove compression dressing if toes turn blue and/or tingle and can not be  relieved by raising the leg for one hour. If unable to reapply in the morning, keep  compression on until next dressing change.    Walk as much as you can, as you are able. Whenever you sit raise your ankle  above your hips to promote wound healing.       Lars Torres M.D. November 1, 2023    Call us at [phone] if you have any questions about your wounds, have redness or swelling around your wound, have a fever of 101 degrees Fahrenheit or greater or if you have any other problems or concerns. We answer the phone Monday through Friday 8 am to 4 pm, please leave a message as we check the voicemail frequently throughout the day.     If you had a positive experience please indicate that on your patient satisfaction survey form that [facility] will be sending you.    It was a pleasure meeting  with you today.  Thank you for allowing me and my team the privilege of caring for you today.  YOU are the reason we are here, and I truly hope we provided you with the excellent service you deserve.  Please let us know if there is anything else we can do for you so that we can be sure you are leaving completely satisfied with your care experience.      If you have any billing related questions please call the [facility] Business office at [phone]. The clinic staff does not handle billing related matters.    If you are scheduled to have a follow up appointment, you will receive a reminder call the day before your visit. On the appointment day please arrive 15 minutes prior to your appointment time. If you are unable to keep that appointment, please call the clinic to cancel or reschedule. If you are more than 10 minutes late or greater for your scheduled appointment time, the clinic policy is that you may be asked to reschedule.         ,

## 2023-11-01 NOTE — DISCHARGE INSTRUCTIONS
Elizabeth Kelley      1945    A DME order for supplies has been placed to [facility], [address]. If there are any issues with your order including not receiving the order please call [facility] at [phone] option 1. They can also provide a tracking number for you if you had supplies shipped to you.    Dressing changes outside of clinic are being performed by Patient    [facility] Phone: [phone] Dr Velázquez please call for appointment  [address] [facility], [address] [place] [address][id number] next week    PLAN:  -Keep appointment with Dr. Floyd on 11/13  -Continue using your new [employer] pump! -- 1hour a day for a week, try to  increase each week  -Continue to work with lymphedema therapist once per week  -Please raise your legs above your hips for 30 mins 2 times a day to promotewound healing.  -Walk as much as you can. When you sit raise your ankle above your hips to  -promote wound healing.  -Diet: A diet high in protein: shakes, bars, eggs, peanut butter, cottage cheese      Wound Care recommendations to Right Anterior Lower Leg  -Wash your hands with soap and water before you begin your dressing change and  prepare a clean surface for dressings  -After cleansing with mild unscented soap (such as Cetaphil, Cerave or Dove) and water,  -Apply small amount of VASHE on gauze, lay into wound bed, let sit for 15 minutes,  remove gauze (do not rinse)  -Apply Triamcinolone Cream to red, irritated, itchy areas of your leg  -Apply 1/6th piece of Fibrocol to wound bed  -Apply 1 4x4 mepilex border dressing  -Then apply Red Stripe EdemaWear from toes to knee. EdemaWear should be  worn 24/7 unless bathing/showering or changing the dressing. You will wash and  reuse the EdemaWear. DO NOT CUT THE EDEMAWEAR. IF IT IS TOO LONG THEN CUFF  THE EDEMAWEAR (the EdemaWear can shrink length wise with washing).  -Pull up spandagrip size F from toes to mid calf then pull up spandagrip size G  from calf to behind the knee  -Change every other day if drainage allows; otherwise change if soiled/saturated    Walk as much as you can, as you are able. Whenever you sit raise your ankle  above your hips to promote wound healing.        Left Leg:  -Keep leg clean and dry, Apply a ceramide based lotion (Cera-Ve, Vanicream, Cetaphil)  -navy stripe EdemaWear should be worn 24/7 unless bathing/showering or changing  the dressing.  You will wash and reuse the EdemaWear. DO NOT CUT THE EDEMAWEAR. IF IT IS TOO  LONG THEN CUFF THE EDEMAWEAR (the EdemaWear can shrink length wise with  washing).    Compression:  Your compression is Spandigrip G and can be removed at night and put back on first  thing in the morning.  Please remove compression dressing if toes turn blue and/or tingle and can not be  relieved by raising the leg for one hour. If unable to reapply in the morning, keep  compression on until next dressing change.    Walk as much as you can, as you are able. Whenever you sit raise your ankle  above your hips to promote wound healing.       Lars Torres M.D. November 1, 2023    Call us at [phone] if you have any questions about your wounds, have redness or swelling around your wound, have a fever of 101 degrees Fahrenheit or greater or if you have any other problems or concerns. We answer the phone Monday through Friday 8 am to 4 pm, please leave a message as we check the voicemail frequently throughout the day.     If you had a positive experience please indicate that on your patient satisfaction survey form that [facility] will be sending you.    It was a pleasure meeting with you today.  Thank you for allowing me and my team the privilege of caring for you today.  YOU are the reason we are here, and I truly hope we provided you with the excellent service you deserve.  Please let us know if there is anything else we can do for you so that we can be sure you are leaving completely satisfied  with your care experience.      If you have any billing related questions please call the [facility] Business office at [phone]. The clinic staff does not handle billing related matters.    If you are scheduled to have a follow up appointment, you will receive a reminder call the day before your visit. On the appointment day please arrive 15 minutes prior to your appointment time. If you are unable to keep that appointment, please call the clinic to cancel or reschedule. If you are more than 10 minutes late or greater for your scheduled appointment time, the clinic policy is that you may be asked to reschedule.

## 2023-11-01 NOTE — ADDENDUM NOTE
Encounter addended by: Lucia Ramirez RN on: 11/1/2023 3:37 PM   Actions taken: Order list changed, Diagnosis association updated

## 2023-11-02 ENCOUNTER — LAB REQUISITION (OUTPATIENT)
Dept: LAB | Facility: CLINIC | Age: 78
End: 2023-11-02
Payer: COMMERCIAL

## 2023-11-02 DIAGNOSIS — I10 ESSENTIAL (PRIMARY) HYPERTENSION: ICD-10-CM

## 2023-11-02 PROCEDURE — 80048 BASIC METABOLIC PNL TOTAL CA: CPT | Mod: ORL | Performed by: FAMILY MEDICINE

## 2023-11-03 LAB
ANION GAP SERPL CALCULATED.3IONS-SCNC: 13 MMOL/L (ref 7–15)
BUN SERPL-MCNC: 18.3 MG/DL (ref 8–23)
CALCIUM SERPL-MCNC: 9.5 MG/DL (ref 8.8–10.2)
CHLORIDE SERPL-SCNC: 103 MMOL/L (ref 98–107)
CREAT SERPL-MCNC: 0.88 MG/DL (ref 0.51–0.95)
DEPRECATED HCO3 PLAS-SCNC: 27 MMOL/L (ref 22–29)
EGFRCR SERPLBLD CKD-EPI 2021: 68 ML/MIN/1.73M2
GLUCOSE SERPL-MCNC: 90 MG/DL (ref 70–99)
POTASSIUM SERPL-SCNC: 3.9 MMOL/L (ref 3.4–5.3)
SODIUM SERPL-SCNC: 143 MMOL/L (ref 135–145)

## 2023-11-13 ENCOUNTER — OFFICE VISIT (OUTPATIENT)
Dept: VASCULAR SURGERY | Facility: CLINIC | Age: 78
End: 2023-11-13
Payer: COMMERCIAL

## 2023-11-13 DIAGNOSIS — I83.019 VARICOSE VEINS WITH ULCER, RIGHT (H): Primary | ICD-10-CM

## 2023-11-13 DIAGNOSIS — L97.919 VARICOSE VEINS WITH ULCER, RIGHT (H): Primary | ICD-10-CM

## 2023-11-13 PROCEDURE — 99214 OFFICE O/P EST MOD 30 MIN: CPT | Performed by: SURGERY

## 2023-11-13 NOTE — PROGRESS NOTES
[facility]: Mariel Kelley has a suspected venous ulceration of her right distal medial calf followed by  at our wound clinic.  Also history of chronic lymphedema being treated by the lymphedema clinic.  Was seen in the clinic on 11/1/2023 for the ulcer measured 3.1 x 2 x 0.2 cm and was relatively unchanged.  Using Vashe soaks followed by Flagyl powder, Fibracol, dressing and Spandage/EdemaWear    Right leg venous duplex completed 8/3/2023: No DVT.  Incompetence right common femoral vein but no thigh or calf incompetence of the deep system.  Thigh greater saphenous vein has apparently been removed.  Distal thigh GSV competent with 1 incompetent segment at the proximal calf @ 2.8 mm with a reflux of 5189 ms.  This is close to the ulcer area no direct communication including the varicose vein that goes posteriorly not anteriorly towards the ulcer incompetent accessory saphenous vein but too short for treatment of less than 4 cm.  Gives off multiple thigh varicosities to go close to the ulcer site.  Lesser saphenous vein is competent.  No incompetent perforators.    HISTORY: Patient has had heavy legs since she was a child.  She has had reduction therapy on both of her thighs which removed many of her lymphatics.  She has had chronic lymphedema type swelling for at least 15 years.  She did have an ulcer in the left calf 10 to 12 years ago that eventually healed with biologic agents and has remained healed.    She cut her right anterior tibial region in October 2020.  This eventually became an ulcer and that was almost circumferential.  At least 6 weeks of IV antibiotics that did not help.  She met  at the wound clinic in March 2022.  He admitted to the hospital with aggressive treatment over 95% of the leg healed without grafting but has had the remaining ulcer that we see presently.    Does have the new battery-powered thigh-high and lymphedema pumps that she uses most of the time  on both of her legs.  She does have EdemaWear but on the right leg she is applying this over her Spandagrip and not directly on the skin.  She uses a Mepilex foam over the dressing to the ulcer.      Exam: Alert and appropriate.  Very pleasant and talkative.   Very heavy thighs bilaterally.  Chronic firm scar type tissue on both calfs from the proximal one third down to the ankle.  No ulcers on the left.  On the right there is a clean ulcer bed with a moderate amount of bioburden noted.  No visible or palpable varicosities but very inaccurate clinical exam due to the chronic leg issues.      Impression: #1.  Chronic bilateral lymphedema is one of the causative factors.  He is using the lymphedema pumps.  I would recommend that her EdemaWear be directly over the skin except for the Mepilex foam dressing as since this may be more helpful to aid in healing.  Her present EdemaWear that she uses on the calf are very worn out and she will talk to the wound clinic about getting new ones that are appropriate     #2.  No significant deep venous insufficiency.  No incompetent perforators     #3.  Clinically significant reflux of the accessory saphenous and varicosities that do not go to the ulcer site.  Thus treatment of this would not be of any benefit and she is already using chronic compression.       #4.  Previous stripping of the thigh greater saphenous vein.  Saphenous vein is small but patent from the distal thigh and proximal calf and occluded distally.  Though there is an incompetence at this level close to the ulcer there is no direct communication by either varicosities or perforators to the ulcer.  Thus treating this very small segment with a AGUSTÍN of no clinical benefit.  Also concerns about treating his segment with her skin issues and whether one could even get to this area due to the nonvisualization of the distal calf greater saphenous vein.  I do not feel that treating this would be beneficial discussed the  reasons with her.    Over 35 minutes with patient today.    VCSS: Right= 17   Left= 10  CEAP: Right C6   Left C5    Quan Floyd MD

## 2023-11-13 NOTE — NURSING NOTE
Patient Reported symptoms:    Right leg   Heaviness Some of the time   Achiness A little of the time   Swelling Some of the time   Throbbing A little of the time   Itching None of the time   Appearance Slightly noticeable   Impact on work/activities Moderately reduced    Left Leg   Heaviness Some of the time   Achiness A little of the time   Swelling Some of the time   Throbbing A little of the time   Itching None of the time   Appearance Slightly noticeable   Impact on work/activities Mildly reduced

## 2023-11-13 NOTE — LETTER
11/13/2023         RE: Elizabeth Kelley  [address]  [place] [address]        Dear Colleague,    Thank you for referring your patient, Elizabeth Kelley, to the [facility] [place]. Please see a copy of my visit note below.     [facility]: Mariel Kelley has a suspected venous ulceration of her right distal medial calf followed by  at our wound clinic.  Also history of chronic lymphedema being treated by the lymphedema clinic.  Was seen in the clinic on 11/1/2023 for the ulcer measured 3.1 x 2 x 0.2 cm and was relatively unchanged.  Using Vashe soaks followed by Flagyl powder, Fibracol, dressing and Spandage/EdemaWear    Right leg venous duplex completed 8/3/2023: No DVT.  Incompetence right common femoral vein but no thigh or calf incompetence of the deep system.  Thigh greater saphenous vein has apparently been removed.  Distal thigh GSV competent with 1 incompetent segment at the proximal calf @ 2.8 mm with a reflux of 5189 ms.  This is close to the ulcer area no direct communication including the varicose vein that goes posteriorly not anteriorly towards the ulcer incompetent accessory saphenous vein but too short for treatment of less than 4 cm.  Gives off multiple thigh varicosities to go close to the ulcer site.  Lesser saphenous vein is competent.  No incompetent perforators.    HISTORY: Patient has had heavy legs since she was a child.  She has had reduction therapy on both of her thighs which removed many of her lymphatics.  She has had chronic lymphedema type swelling for at least 15 years.  She did have an ulcer in the left calf 10 to 12 years ago that eventually healed with biologic agents and has remained healed.    She cut her right anterior tibial region in October 2020.  This eventually became an ulcer and that was almost circumferential.  At least 6 weeks of IV antibiotics that did not help.  She met  at the wound clinic in  March 2022.  He admitted to the hospital with aggressive treatment over 95% of the leg healed without grafting but has had the remaining ulcer that we see presently.    Does have the new battery-powered thigh-high and lymphedema pumps that she uses most of the time on both of her legs.  She does have EdemaWear but on the right leg she is applying this over her Spandagrip and not directly on the skin.  She uses a Mepilex foam over the dressing to the ulcer.      Exam: Alert and appropriate.  Very pleasant and talkative.   Very heavy thighs bilaterally.  Chronic firm scar type tissue on both calfs from the proximal one third down to the ankle.  No ulcers on the left.  On the right there is a clean ulcer bed with a moderate amount of bioburden noted.  No visible or palpable varicosities but very inaccurate clinical exam due to the chronic leg issues.      Impression: #1.  Chronic bilateral lymphedema is one of the causative factors.  He is using the lymphedema pumps.  I would recommend that her EdemaWear be directly over the skin except for the Mepilex foam dressing as since this may be more helpful to aid in healing.  Her present EdemaWear that she uses on the calf are very worn out and she will talk to the wound clinic about getting new ones that are appropriate     #2.  No significant deep venous insufficiency.  No incompetent perforators     #3.  Clinically significant reflux of the accessory saphenous and varicosities that do not go to the ulcer site.  Thus treatment of this would not be of any benefit and she is already using chronic compression.       #4.  Previous stripping of the thigh greater saphenous vein.  Saphenous vein is small but patent from the distal thigh and proximal calf and occluded distally.  Though there is an incompetence at this level close to the ulcer there is no direct communication by either varicosities or perforators to the ulcer.  Thus treating this very small segment with a AGUSTÍN of no  clinical benefit.  Also concerns about treating his segment with her skin issues and whether one could even get to this area due to the nonvisualization of the distal calf greater saphenous vein.  I do not feel that treating this would be beneficial discussed the reasons with her.    Over 35 minutes with patient today.    VCSS: Right= 17   Left= 10  CEAP: Right C6   Left C5    Quan Floyd MD       Again, thank you for allowing me to participate in the care of your patient.        Sincerely,        Quan Floyd MD

## 2023-11-28 PROBLEM — R26.81 UNSTEADINESS ON FEET: Status: RESOLVED | Noted: 2023-06-01 | Resolved: 2023-11-28

## 2023-11-28 PROBLEM — M62.81 GENERALIZED MUSCLE WEAKNESS: Status: RESOLVED | Noted: 2023-06-01 | Resolved: 2023-11-28

## 2023-11-28 NOTE — PROGRESS NOTES
DISCHARGE  Reason for Discharge: Pt cancelled remaining follow ups. See note below for progress towards goals.    Equipment Issued: compression bandages    Discharge Plan: Patient to continue home program.  Other services: Wound care.    Referring Provider:  Francisco Ramirez         08/10/23 0500   Appointment Info   Signing clinician's name / credentials Cindi Wagoner, PT, DPT, CLT-ARTHUR   Total/Authorized Visits 10   Visits Used 6   Medical Diagnosis Venous hypertension of lower extremity, bilateral  Acquired lymphedema of leg   PT Tx Diagnosis Venous hypertension of lower extremity, bilateral  Acquired lymphedema of leg, muscle weakness, unsteadiness on feet   Precautions/Limitations MS   Quick Adds Certification   Progress Note/Certification   Start of Care Date 06/01/23   Onset of illness/injury or Date of Surgery 04/05/23   Therapy Frequency 1x/week or every other week   Predicted Duration 12 weeks   Certification date from 06/01/23   Certification date to 08/29/23   Progress Note Due Date 08/29/23   GOALS   PT Goals 2   PT Goal 1   Goal Identifier Self-care   Goal Description Pt will be independent in self-care and home management of condition including skin care, compression wear/care, self MLD, use of compression pump and exercise.   Goal Progress progressing- using pump daily, skin care and wound care as prescribed   Target Date 08/29/23   PT Goal 2   Goal Identifier 5TSTS   Goal Description Pt will demonstrate a decreased falls risk by decreasing her 5TSTS score to <10 seconds without UE support   Target Date 08/29/23   Subjective Report   Subjective Report Pt reports she hyperextended her knee yesterday. Her leg is also painful from the wound care yesterday. Overall, they did think the wound was improving.   Objective Measures   Objective Measures Objective Measure 1;Objective Measure 2   Objective Measure 2   Objective Measure wounds: right lower leg   Details medial distal: 0.6 cmx 1.2 cm,  proximal medial knee 2.5 cm x 3.5 cm serosanguineous fluid, not measured today, right lower medial wound dry   Treatment Interventions (PT)   Interventions Manual Therapy;Therapeutic Procedure/Exercise   Manual Therapy   Manual Therapy: Mobilization, MFR, MLD, friction massage minutes (11571) 54   Manual Therapy Manual Therapy 2;Manual Therapy 3   Manual Therapy 1 wound care   Manual Therapy 1 - Details washed legs gently with wash cloth and distalled water, used triamcinolone acetonide cream on wounds- forgot today, and powder, added  pads, kerlix over top   Manual Therapy 2 MLD   Manual Therapy 2 - Details MLD to R LEs starting with abdominal and inguinal node clearing   Manual Therapy 3 compression   Manual Therapy 3 - Details placed edema wear and comperm size G on the R LE   Skilled Intervention MLD, wound care and compression to improve lymphatic function, promote tissue/wound healing and reduce swelling in thighs/abdomen   Patient Response/Progress good tolerance and wounds are improving   Education   Learner/Method Patient   Plan   Home program PTRx   Plan for next session re-measure wounds and manuel LEs, continue with MLD, wound care, standing/seated LE strengthening as tolerated   Comments   Comments Pt appropriate for continued skilled PT intervention. Responding well to MLD and compression.   Total Session Time   Timed Code Treatment Minutes 54   Total Treatment Time (sum of timed and untimed services) 54

## 2023-12-07 ENCOUNTER — TELEPHONE (OUTPATIENT)
Dept: WOUND CARE | Facility: CLINIC | Age: 78
End: 2023-12-07
Payer: COMMERCIAL

## 2023-12-07 NOTE — TELEPHONE ENCOUNTER
Received telephone call from patient indicating having had an allergic reaction to supplies used on current wound. Please call.  Mazin Overton LPN on 12/7/2023 at 2:49 PM

## 2023-12-07 NOTE — TELEPHONE ENCOUNTER
Spoke with patient  who stated that her skin is itchy, red and raw where the silicone adhesive was from the mepilex bandages. She stated she stopped using them and uses some cloth bandages that she buys over the counter and work well for her. She states the fibrocol dressing is working well for her. Denies fever and chills. Denies the redness spreading and states it is only in the area of the silicone adhesive.     States she is using her triamcinolone cream which is helping the itching. Discussed that she should come in and be evaluated. Patient unable to come at appointment time available next week. Patient is able to go to [place] as they have an appointment next week on 12/14 that would work for her. Writer gave phone number to [facility] so patient can call and schedule. Discussed that she could send photos over through [employer] as well.

## 2023-12-11 NOTE — PATIENT INSTRUCTIONS
"Wound care supplies were not ordered or needed today.      Wound Care Instructions    EVERY OTHER DAY and as needed, Cleanse your right leg wound(s) with 10 minute vashe soak.    Pat Dry with non-sterile gauze    Apply Lotion to the intact skin surrounding your wound and other dry skin locations. Some good lotions include: Remedy Skin Repair Cream, Sarna, Vanicream or Cetaphil    Primary Dressing: Apply fibracol into/onto the wounds    Secondary dressing: Cover with ABD or dry gauze    Secure with non-sterile roll gauze (4\" x 75\" roll) and tape (1\" roll tape) as needed; avoid adhesive directly on the skin    Compression: compression sock and tubigrip    It is ok to get your wound wet in the bath or shower    It is recommended that you elevate your legs throughout the day: approx 2-3 times each day  Elevate them above the level of your heart for 30 min.   Ways to do this:   Lay on the couch or your bed and prop your legs up on pillows   Recline back as far as you can go in your recliner and prop your legs on pillows.     Doing these things will help reduce the edema in your legs.    High Protein Foods  When you have an open ulcer, your bodies protein needs are much higher, so it is recommended eat good sources of protein or take a protein supplement!    Protein Supplements  -Premier Protein  -Ensure  -Boost  -Glucerna, if diabetic    Chicken  -Chicken breast, 3.5oz.-30 grams protein  -Chicken meat, cooked, 4 oz.    Beef  -Hamburger frank, 4 oz-28 grams protein  -Steak, 6 oz-42 grams  -Most cuts of beef- 7 grams of protein per ounce    Fish  -Most fish fillets or steaks are about 22 grams of protein for 3 1/2 oz(100 grams) of cooked fish, or 6 grams per ounce  -Tuna, 6 oz can-40 grams of protein    Pork  -Pork chop, average-22 grams protein  -Pork loin or tenderloin, 4 oz.-29 grams  -Ham, 3oz serving- 19 grams  -Koo, 1 slice-3 grams    Eggs and Dairy  -Egg, large-7 grams  -Milk, 1 cup-8 grams  -Cottage cheese, 1/2 " cup-15 grams  -Greek yogurt, 1 cup-usually 8-12 grams, check label    Beans  -Soy milk, 1 cup-6-10 grams  -Most beans(black, kenyon, lentils, etc.) about 7-10 grams protein per half cup of cooked beans    Nuts and Seeds  -Peanut butter, 2 Tablespoons- 8 grams protein  -Almonds, 1/4 cup- 8 grams  -Peanuts, 1/4 cup-9 grams  -Sunflower seeds, 1/4 cup- 6 grams        If for some reason you are not able to get your dressing(s) changed as outlined above (due to illness, lack of supplies, lack of help) please do the following: remove old, soiled dressings; wash the wounds with saline; pat dry; apply ABD pad or other absorbant pad and secure with rolled gauze; avoid tape directly on your skin; Call the clinic as soon as possible to let us know what the current issues are in receiving wound care [phone].      SEEK MEDICAL CARE IF:  You have an increase in swelling, pain, or redness around the wound.  You have an increase in the amount of pus coming from the wound.  There is a bad smell coming from the wound.  The wound appears to be worsening/enlarging  You have a fever greater than 101.5 F      It is ok to continue current wound care treatment/products for the next 2-3 days until new wound care supplies are ordered and arrive. If longer than this please contact our office at [phone].    If you have a 2 layer or 4 layer compression wrap on these are safe to have on for ONLY 7 days. If for some reason you are not able to get the wrap(s) changed (due to illness; lack of supplies, lack of help, lack of transportation) please do the following: unwrap the old 2 or 4 layer compression wrap; avoid using scissors as you could cut your skin and cause wounds; use tubular compression when available. Call to reschedule your home care or clinic visit appointment as soon as possible.    Please NOTE: if you are 15 minutes late to your clinic appointment you will have to be rescheduled. Please call our clinic as soon as possible if  you know you will not be able to get to your appointment at [phone].    If you fail to show up to 3 scheduled clinic appointments you will be dismissed from our clinic.              We want to hear from you!  In the next few weeks, you should receive a call or email to complete a survey about your visit at [facility]. Please help us improve your appointment experience by letting us know how we did today. We strive to make your experience good and value any ways in which we could do better.      We value your input and suggestions.    Thank you for choosing the [facility]!

## 2023-12-14 ENCOUNTER — OFFICE VISIT (OUTPATIENT)
Dept: VASCULAR SURGERY | Facility: CLINIC | Age: 78
End: 2023-12-14
Attending: FAMILY MEDICINE
Payer: COMMERCIAL

## 2023-12-14 VITALS
SYSTOLIC BLOOD PRESSURE: 154 MMHG | OXYGEN SATURATION: 98 % | DIASTOLIC BLOOD PRESSURE: 66 MMHG | HEART RATE: 72 BPM | RESPIRATION RATE: 16 BRPM

## 2023-12-14 DIAGNOSIS — R60.0 LOCALIZED EDEMA: Primary | ICD-10-CM

## 2023-12-14 DIAGNOSIS — L97.912 ULCER OF RIGHT LOWER EXTREMITY WITH FAT LAYER EXPOSED (H): ICD-10-CM

## 2023-12-14 PROCEDURE — 99215 OFFICE O/P EST HI 40 MIN: CPT | Mod: 25 | Performed by: FAMILY MEDICINE

## 2023-12-14 PROCEDURE — 11042 DBRDMT SUBQ TIS 1ST 20SQCM/<: CPT | Performed by: FAMILY MEDICINE

## 2023-12-14 PROCEDURE — 99214 OFFICE O/P EST MOD 30 MIN: CPT | Mod: 25 | Performed by: FAMILY MEDICINE

## 2023-12-14 PROCEDURE — G0463 HOSPITAL OUTPT CLINIC VISIT: HCPCS | Performed by: FAMILY MEDICINE

## 2023-12-14 RX ORDER — SULFAMETHOXAZOLE/TRIMETHOPRIM 800-160 MG
1 TABLET ORAL 2 TIMES DAILY
Qty: 28 TABLET | Refills: 0 | Status: SHIPPED | OUTPATIENT
Start: 2023-12-14 | End: 2023-12-28

## 2023-12-14 ASSESSMENT — PAIN SCALES - GENERAL: PAINLEVEL: NO PAIN (0)

## 2023-12-14 NOTE — PROGRESS NOTES
Wound Clinic Note         Visit date: 12/14/2023       Cheif Complaint:     Elizabeth Kelley is a 78 year old   female had concerns including Rt leg wound.  The patient has lower extremity edema and right leg ulcers.          HISTORY OF PRESENT ILLNESS:    Elizabeth Kelley reports the wound has been present since 2020.  The wound began due to the area being bumped.   She has chronic lymphedema and works with the lymphedema clinic once a week.  The patient denies a history of congestive heart failure, previous vein procedures or previous DVT.       I last saw this patient in the wound clinic on November 1, 2023.  She comes back to see me today because she has been having a skin reaction from the Mepilex bandage.    Since her last clinic visit with me she has been changing the bandages beginning with a Vashe soak followed Fibracol, dry dressing and a spandagrip stocking all changed every other day.  There is been light serous drainage from the wounds.  There has been no difficulties with the dressing changes.  I also recommended that she wear an edema wear stocking for additional compression however she reports that it is very uncomfortable.  She would prefer to wear her compression stocking over the spandagrip stocking.  Also initially after her last clinic visit we were covering the Fibracol with a Mepilex bandage but she reports she had a skin reaction to this and some superficial skin breakdown next to her primary wound due to the Mepilex bandage so she stopped using that and has been using a dry dressing/gauze over the Fibracol since.      The pateint denies fevers or chills.  They report the pain from the wound has been 0/10 and has remained about the same recently.      The patient reports laying down to elevate their legs above the level of their heart at least twice a day.     She does have pneumatic lymphedema pumps which she uses once a day to help control her swelling.    Since her last clinic visit  with me she saw Dr. Floyd on November 13, 2023 to discuss treatment options for her superficial venous insufficiency.  He did not recommend any treatment for her superficial venous insufficiency.    She confirms she takes Lasix once a day to help control her swelling.    Today the patient reports maintaining a high protein diet, but has not been taking protein supplements lately.        The patient denies a history of diabetes, smoking or chronic steroid use.         The patient has not had any symptoms of infection relating to the wound recently and is not currently on antibiotics.       Problem List:   Past Medical History:   Diagnosis Date    Ankle contracture, left     Class 3 severe obesity due to excess calories without serious comorbidity with body mass index (BMI) of 45.0 to 49.9 in adult (H)     Combined gastric and duodenal ulcer     Controlled substance agreement broken     Depression     Dyslipidemia     Edema     Edema     Gait abnormality     GERD (gastroesophageal reflux disease)     Gout     Lymphedema of both lower extremities     Melanoma (H)     left upper arm    MS (multiple sclerosis) (H)     RLS (restless legs syndrome)     Skin ulcer of left lower leg (H)     Valgus deformity of both feet     Vitamin D deficiency              Family Hx: family history includes Arthritis in her maternal grandmother, maternal uncle, paternal grandfather, paternal grandmother, and sister; Asthma in her sister; Brain Cancer in her father; Breast Cancer in her paternal aunt; Colon Cancer in her paternal aunt; Early Death in her maternal grandfather; Hyperlipidemia in her mother, paternal aunt, and sister; Hypertension in her mother, paternal aunt, and sister; Multiple Sclerosis in her mother and sister; Obesity in her sister; Uterine Cancer in her mother.       Surgical Hx:   Past Surgical History:   Procedure Laterality Date    ABLATION SAPHENOUS VEIN W/ RFA Right 04/12/2021    Saphenous vein, anterior accessory  saphenous vein, sclerotherapy of multiple veins.    COSMETIC SURGERY  1988    reduction of excess skin from weight loss    ESOPHAGOSCOPY, GASTROSCOPY, DUODENOSCOPY (EGD), COMBINED N/A 03/30/2015    Procedure: UPPER ENDOSCOPY with gastric biopsy;  Surgeon: Checo Fletcher MD;  Location: [facility];  Service:     IRRIGATION AND DEBRIDEMENT LOWER EXTREMITY, COMBINED Right 3/21/2022    Procedure: RIGHT LEG WOUND DEBRIDEMENT, PAULIE DERMATONE, MISONIX, VAC VERA FLOW WITH VASHE;  Surgeon: Gabriele Bullock MD;  Location:  OR    IRRIGATION AND DEBRIDEMENT LOWER EXTREMITY, COMBINED Right 3/28/2022    Procedure: DEBRIDEMENT AND WOUND DRESSING CHANGE AND MYRIAD APPLICATION OF RIGHT LOWER LEG WOUND;  Surgeon: Gabriele Bullock MD;  Location:  OR    MAMMOPLASTY REDUCTION Bilateral     MOHS MICROGRAPHIC PROCEDURE      left upper arm, melanoma    PICC SINGLE LUMEN PLACEMENT  8/13/2021         PICC SINGLE LUMEN PLACEMENT  9/2/2021         SPINE SURGERY      L5 area surgery, decompression          Allergies:    Allergies   Allergen Reactions    Other Environmental Allergy Anaphylaxis     Bees/Wasps Stings  Bees/Wasps Stings    Adhesive Tape Other (See Comments)     Red,itchy and oozes  Red,itchy and oozes    Adhesive [Cyanoacrylate] Unknown     Other reaction(s): sores    Oxycodone-Acetaminophen Rash    Vicodin [Hydrocodone-Acetaminophen] Nausea and Vomiting and Hives              Medication History:    Current Outpatient Medications   Medication Sig    acetaminophen (TYLENOL) 325 MG tablet 1-2 tablets as needed Orally every 4 hrs    ammonium lactate (AMLACTIN) 12 % external cream Apply topically daily to dry skin on feet/legs    ammonium lactate (AMLACTIN) 12 % external cream Apply topically daily Apply to bilateral feet/heels daily    Ascorbic Acid (VITAMIN C) 500 MG CHEW 1 tablet Orally Two times daily with meals    aspirin (ASPIRIN 81) 81 MG chewable tablet Take 1 tablet by mouth    aspirin 81 mg chewable tablet Take  81 mg by mouth every evening     atorvastatin (LIPITOR) 20 MG tablet Take 20 mg by mouth daily    benzonatate (TESSALON) 100 MG capsule Take 1 capsule (100 mg) by mouth 3 times daily as needed for cough    Bioflavonoid Products (CINDY-C) 500-550 MG TABS Take 1 tablet by mouth 2 times daily    buPROPion (WELLBUTRIN SR) 150 MG 12 hr tablet Take 150 mg by mouth every morning     calcium carbonate (TUMS) 500 MG chewable tablet Take 1 tablet (500 mg) by mouth daily as needed for heartburn    Carboxymethylcellulose Sodium 0.25 % SOLN Apply 0.05 mLs to eye    citalopram (CELEXA) 40 MG tablet Take 40 mg by mouth every morning     [employer] Super B Complex/C TABS Take 1 tablet by mouth daily    cyanocobalamin (VITAMIN B-12) 1000 MCG tablet Take 1 tablet (1,000 mcg) by mouth daily    cyclobenzaprine (FLEXERIL) 10 MG tablet Take 1 tablet (10 mg) by mouth every 8 hours as needed for muscle spasms    ferrous sulfate (FEROSUL) 325 (65 Fe) MG tablet Take 1 tablet (325 mg) by mouth daily    furosemide (LASIX) 40 MG tablet Take 1 tablet (40 mg) by mouth daily    gabapentin (NEURONTIN) 100 MG capsule TAKE 1 CAPSULE BY MOUTH 3 TIMES A DAY FOR 30 DAYS    gabapentin (NEURONTIN) 300 MG capsule TAKE 1 CAPSULE BY MOUTH THREE TIMES DAILY    gabapentin (NEURONTIN) 400 MG capsule Take 1 capsule (400 mg) by mouth 3 times daily    guaiFENesin (ROBITUSSIN) 20 mg/mL SOLN solution Take 10 mLs by mouth every 4 hours as needed for cough    HYDROmorphone (DILAUDID) 2 MG tablet Take 1 tablet (2 mg) by mouth every 4 hours as needed for moderate to severe pain    lidocaine (LIDODERM) 5 % patch 1 PATCH REMOVE AFTER 12 HOURS EXTERNALLY ONCE A DAY    magnesium oxide (MAG-OX) 400 MG tablet Take 1 tablet by mouth    metroNIDAZOLE (FLAGYL) 500 MG tablet Apply 1 tablet (500 mg) topically every other day    metroNIDAZOLE (FLAGYL) 500 MG tablet CRUSH 1 TABLET AND SPRINKLE ONTO THE WOUND EVERY DAY.    multivitamin w/minerals (CENTRUM ADULTS) tablet Take 1 tablet by  "mouth daily     nitroGLYcerin (NITROSTAT) 0.4 MG sublingual tablet PLACE 1 TABLET UNDER TONGUE EVERY 5 MINTUES AS NEEDED FOR CHEST PAIN FOR UP TO 30 DAYS    Nutritional Supplements (VARICOSE VEINS FORMULA OR) Take 1 tablet by mouth every morning    pantoprazole (PROTONIX) 40 MG EC tablet Take 1 tablet (40 mg) by mouth 2 times daily (before meals)    polyethylene glycol (MIRALAX) 17 GM/Dose powder 17 g    potassium chloride ER (KLOR-CON M) 20 MEQ CR tablet Take 20 mEq by mouth every evening (TAKE IN ADDITION TO AM/NOON DOSE FOR TOTAL 30mEq DAILY)    rOPINIRole (REQUIP) 0.5 MG tablet Take 1 tablet (0.5 mg) by mouth 2 times daily    rOPINIRole (REQUIP) 1 MG tablet Take 1 mg by mouth At Bedtime (TAKE IN ADDITION TO AM/NOON DOSE FOR TOTAL 2MG DAILY)    senna-docusate (SENNA S) 8.6-50 MG tablet Take 1 tablet by mouth as needed    simvastatin (ZOCOR) 40 MG tablet [SIMVASTATIN (ZOCOR) 40 MG TABLET] Take 40 mg by mouth bedtime.    Skin Protectants, Misc. (INTERDRY 10\"X36\") SHEE Externally apply 7 Units topically once as needed (change daily in groin rash/fold)    spironolactone (ALDACTONE) 25 MG tablet [SPIRONOLACTONE (ALDACTONE) 25 MG TABLET] Take 25 mg by mouth daily.    sulfamethoxazole-trimethoprim (BACTRIM DS) 800-160 MG tablet Take 1 tablet by mouth 2 times daily for 14 days    triamcinolone (ARISTOCORT HP) 0.5 % external cream Apply topically daily Apply to red/irritated skin around wound daily    triamcinolone (KENALOG) 0.1 % external cream Apply topically 2 times daily    triamcinolone (KENALOG) 0.1 % external cream Apply topically 2 times daily Apply to intact skin on right leg    triamcinolone (KENALOG) 0.1 % external ointment Apply topically every 48 hours To intact skin on right leg, as well as posterior calf on right leg redness    vitamin B-Complex Take 1 tablet by mouth daily    vitamin D3 (CHOLECALCIFEROL) 250 mcg (94488 units) capsule Take 1 capsule (250 mcg) by mouth daily    zinc 50 MG TABS Take 1 tablet " by mouth     No current facility-administered medications for this visit.         Tobacco History:  reports that she quit smoking about 48 years ago. Her smoking use included cigarettes. She has a 3.00 pack-year smoking history. She has never used smokeless tobacco.       REVIEW OF SYMPTOMS:   The review of systems was negative except as noted in the HPI.           PHYSICAL EXAMINATION:     BP (!) 154/66   Pulse 72   Resp 16   SpO2 98%            GENERAL: The patient overall appears well and is no acute distress.   HEAD: normocephalic   EYES: Sclera and conjunctiva clear   NECK: no obvious masses   LUNGS: breathing is unlabored.   EXTREMITIES: No clubbing, cyanosis or edema   SKIN: No rashes or other abnormalities except as noted under the Wound section below.   NEUROLOGICAL: normal motor and sensory function   EDEMA: Sever  and Lymphedema       WOUND: There is a periwound erythema present today with increased warmth of the skin in the area.  There is no subcutaneous emphysema or woody induration.     Wound bed: necrotic material  Periwound: cellulitic without crepitus or woody induration   The primary wound that she had is much smaller compared with her last clinic visit with me but she does have a new smaller superficial area of skin breakdown due to the adhesive.  Both of these areas have some necrotic material in them.    Also see below for wound details:       Circumferential volume measures:            6/10/2021    11:00 AM 6/16/2021     1:00 PM 6/22/2021     1:00 PM 6/29/2021     2:00 PM 7/30/2021     1:00 PM   Circumferential Measures   Right just above MTP 25.5 23.6 24.8 24 23.3   Right Ankle 25.5 25.4 27 26 25.2   Right Widest Calf 61 56.6 58 59.5 61   Right Thigh Up 10cm 78    78.8   Left - just above MTP 23.5 22.6 23.8 23 24   Left Ankle 28 24.2 26.5 25.5 25   Left Widest Calf 54 53.6 54.6 53.5 51.5   Left Thigh Up 10cm 71   68.5 77.2       Ulceration(s)/Wound(s):   Please see the media tab under the  chart review for pictures of the wounds.  Nursing staff removed dressings and cleansed wound.    VASC Wound Rt lateral leg (Active)   Pre Size Length 2.5 12/14/23 0800   Pre Size Width 2.3 12/14/23 0800   Pre Size Depth 0.1 12/14/23 0800   Pre Total Sq cm 5.75 12/14/23 0800   Post Size Length 2.5 12/14/23 0800   Post Size Width 2.3 12/14/23 0800   Post Size Depth 0.2 12/14/23 0800   Post Total Sq cm 5.75 12/14/23 0800       VASC Wound Rt medial leg (Active)   Pre Size Length 2.5 12/14/23 0800   Pre Size Width 2 12/14/23 0800   Pre Size Depth 0.1 12/14/23 0800   Pre Total Sq cm 5 12/14/23 0800   Post Size Length 2.5 12/14/23 0800   Post Size Width 2 12/14/23 0800   Post Size Depth 0.2 12/14/23 0800   Post Total Sq cm 2.5 12/14/23 0800                     Recent Labs   Lab Test 04/04/22  0523 06/23/16  1130   A1C 5.4 5.9          Recent Labs   Lab Test 02/10/23  1535 05/25/22  1637 04/04/22  0523   ALBUMIN 4.3 4.1 1.9*      October 27, 2021 ankle-brachial indices: Normal  October 27, 2021 right lower extremity venous insufficiency ultrasound: Normal      Procedure note:  I had previous obtain informed consent from the patient to perform serial debridements, I confirmed this again with the patient today verbally.  Anesthetized as needed with lidocaine.  Using a curette and/or a scalpel I performed an excisional debridement removing all necrotic material at the Right leg wound in to the level of viable subcutaneous tissue.  I obtained hemostasis with direct pressure.  The patient tolerated the procedure well.  EBL: <5 ml  Total debridement surface area: Less than 20 cm                  ASSESSMENT:   This is a 78 year old  female with lower extremity edema and right leg ulcers.           PLAN:   We will continue to bandage the open areas with a Vashe soak followed Fibracol, a dry dressing and the spandagrip stocking and her compression stockings all changed every other day.    Due to her slight periwound erythema I have  prescribed her Bactrim, she has had MRSA in the past.    Separate from the discussion of her wound care we then discussed the treatment of her lower extremity edema.  I have encouraged the patient to continue to elevate the legs as they have been doing, including laying down with their legs above the level of the heart for 30 minutes at least twice a day.    I have strongly encouraged her to continue to use her pneumatic lymphedema pump once a day to help control her swelling.  I have encouraged the patient to continue on their high protein diet to aid in wound healing.   The patient will return to the wound clinic in 2-3 weeks to see me again.        30 minutes spent on the date of the encounter doing chart review, history and exam, documentation and further activities per the note      Lars Torres MD  12/14/2023   9:24 AM   [facility]  [phone]    This note was electronically signed by Lars Torres MD

## 2023-12-28 ENCOUNTER — TELEPHONE (OUTPATIENT)
Dept: WOUND CARE | Facility: CLINIC | Age: 78
End: 2023-12-28
Payer: COMMERCIAL

## 2023-12-28 NOTE — TELEPHONE ENCOUNTER
Voice message received from patient who is in [place] visiting her daughter. She is without her Bactrim medication; seeking a call back to see if the prescription can be called in to a local pharmacy.  Mazin Overton LPN on 12/28/2023 at 9:09 AM

## 2023-12-28 NOTE — TELEPHONE ENCOUNTER
Returned call to patient. The Bactrim was scheduled to end today 12/28. The patient is opting to forgo the doses today and end her antibiotic course. The patient requested a prescription not be placed. No further questions or concerns.

## 2024-01-10 NOTE — PATIENT INSTRUCTIONS
"Wound care supplies were not ordered or needed today.      Wound Care Instructions    EVERY OTHER DAY and as needed, Cleanse your right leg wound(s) with 10 minute vashe soak.    Pat Dry with non-sterile gauze    Primary Dressing: Apply hydrofera blue ready transfer into/onto the wounds    Secondary dressing: Cover with ABD or dry gauze    Secure with non-sterile roll gauze (4\" x 75\" roll) and tape (1\" roll tape) as needed; avoid adhesive directly on the skin    Compression: double tubigrip    It is ok to get your wound wet in the bath or shower    It is recommended that you elevate your legs throughout the day: approx 2-3 times each day  Elevate them above the level of your heart for 30 min.   Ways to do this:   Lay on the couch or your bed and prop your legs up on pillows   Recline back as far as you can go in your recliner and prop your legs on pillows.     Doing these things will help reduce the edema in your legs.      If for some reason you are not able to get your dressing(s) changed as outlined above (due to illness, lack of supplies, lack of help) please do the following: remove old, soiled dressings; wash the wounds with saline; pat dry; apply ABD pad or other absorbant pad and secure with rolled gauze; avoid tape directly on your skin; Call the clinic as soon as possible to let us know what the current issues are in receiving wound care [phone].      SEEK MEDICAL CARE IF:  You have an increase in swelling, pain, or redness around the wound.  You have an increase in the amount of pus coming from the wound.  There is a bad smell coming from the wound.  The wound appears to be worsening/enlarging  You have a fever greater than 101.5 F      It is ok to continue current wound care treatment/products for the next 2-3 days until new wound care supplies are ordered and arrive. If longer than this please contact our office at [phone].    If you have a 2 layer or 4 layer compression wrap on these are safe " to have on for ONLY 7 days. If for some reason you are not able to get the wrap(s) changed (due to illness; lack of supplies, lack of help, lack of transportation) please do the following: unwrap the old 2 or 4 layer compression wrap; avoid using scissors as you could cut your skin and cause wounds; use tubular compression when available. Call to reschedule your home care or clinic visit appointment as soon as possible.    Please NOTE: if you are 15 minutes late to your clinic appointment you will have to be rescheduled. Please call our clinic as soon as possible if you know you will not be able to get to your appointment at [phone].    If you fail to show up to 3 scheduled clinic appointments you will be dismissed from our clinic.              We want to hear from you!  In the next few weeks, you should receive a call or email to complete a survey about your visit at [facility]. Please help us improve your appointment experience by letting us know how we did today. We strive to make your experience good and value any ways in which we could do better.      We value your input and suggestions.    Thank you for choosing the [facility]!

## 2024-01-18 ENCOUNTER — OFFICE VISIT (OUTPATIENT)
Dept: VASCULAR SURGERY | Facility: CLINIC | Age: 79
End: 2024-01-18
Attending: FAMILY MEDICINE
Payer: COMMERCIAL

## 2024-01-18 VITALS
HEART RATE: 82 BPM | DIASTOLIC BLOOD PRESSURE: 77 MMHG | RESPIRATION RATE: 16 BRPM | OXYGEN SATURATION: 97 % | SYSTOLIC BLOOD PRESSURE: 139 MMHG

## 2024-01-18 DIAGNOSIS — R60.0 LOCALIZED EDEMA: Primary | ICD-10-CM

## 2024-01-18 DIAGNOSIS — L97.912 ULCER OF RIGHT LOWER EXTREMITY WITH FAT LAYER EXPOSED (H): ICD-10-CM

## 2024-01-18 PROCEDURE — G0463 HOSPITAL OUTPT CLINIC VISIT: HCPCS | Mod: 25 | Performed by: FAMILY MEDICINE

## 2024-01-18 PROCEDURE — 99215 OFFICE O/P EST HI 40 MIN: CPT | Performed by: FAMILY MEDICINE

## 2024-01-18 PROCEDURE — 11042 DBRDMT SUBQ TIS 1ST 20SQCM/<: CPT | Performed by: FAMILY MEDICINE

## 2024-01-18 PROCEDURE — 99214 OFFICE O/P EST MOD 30 MIN: CPT | Mod: 25 | Performed by: FAMILY MEDICINE

## 2024-01-18 ASSESSMENT — PAIN SCALES - GENERAL: PAINLEVEL: NO PAIN (0)

## 2024-01-18 NOTE — LETTER
[facility]  [address] [place] [address] [address]  [place], [place] [address]  [phone]      Fax [phone]    [facility], located within [facility]           Fax: [phone]            Customer Service: [phone]        Account #: [id number]    Wound Dressing Rx and Order Form  Order Status: new  Verbal: Morelia  Date: 2024     Elizabeth Pastranaamanda  Gender: female  : 1945  [address]  [place] [address]  [phone] (home)     Medical Record: [id number]  Primary Care Provider: Francisco Ramirez      ICD-10-CM    1. Localized edema  R60.0 Wound care      2. Ulcer of right lower extremity with fat layer exposed (H)  L97.912 DEBRIDE SKIN/SUBQ TISSUE     Wound care            Insurance Info:  INSURER: Payor: [employer] / Plan: [employer] / Product Type: HMO /   Policy ID#:  [id number]  SECONDARY INSURANCE:  [employer]  Secondary Policy ID#:  [id number]        Physician Info:   Name:  RODRIGO MATTHEWS     Dept Address/Phones:   [address] [place] [address], [address]  [facility] [address] [phone]  Fax: [phone]    Lymphedema circumferential measurements (in cm):      6/10/2021    11:00 AM 2021     1:00 PM 2021     1:00 PM 2021     2:00 PM 2021     1:00 PM   Circumferential Measures   Right just above MTP 25.5 23.6 24.8 24 23.3   Right Ankle 25.5 25.4 27 26 25.2   Right Widest Calf 61 56.6 58 59.5 61   Right Thigh Up 10cm 78    78.8   Left - just above MTP 23.5 22.6 23.8 23 24   Left Ankle 28 24.2 26.5 25.5 25   Left Widest Calf 54 53.6 54.6 53.5 51.5   Left Thigh Up 10cm 71   68.5 77.2         Wound info:  Wound Leg Other (comment);Ulceration (Active)   Number of days: 911       Wound (used by OP [facility] only) 03/10/22 0900 Right lower;anterior leg ulceration, venous (Active)   Number of days: 679       Wound (used by OP I only) 23 1550 Right leg (Active)   Number of days: 360       Wound (used by OP I only) 23  "1101 Right medial;lower leg (Active)   Number of days: 231       VASC Wound right lower leg (Active)   Pre Size Length 17 02/02/22 1500   Pre Size Width 35 02/02/22 1500   Pre Size Depth 0.1 02/02/22 1500   Pre Total Sq cm 595 02/02/22 1500   Number of days: 995       VASC Wound Rt lateral leg (Active)   Pre Size Length 2.7 01/18/24 1200   Pre Size Width 1.8 01/18/24 1200   Pre Size Depth 0.1 01/18/24 1200   Pre Total Sq cm 4.86 01/18/24 1200   Post Size Length 2.7 01/18/24 1200   Post Size Width 1.8 01/18/24 1200   Post Size Depth 0.1 01/18/24 1200   Post Total Sq cm 4.86 01/18/24 1200   Number of days: 35       VASC Wound Rt medial leg (Active)   Pre Size Length 3 01/18/24 1200   Pre Size Width 2 01/18/24 1200   Pre Size Depth 0.1 01/18/24 1200   Pre Total Sq cm 6 01/18/24 1200   Post Size Length 3 01/18/24 1200   Post Size Width 2 01/18/24 1200   Post Size Depth 0.1 01/18/24 1200   Post Total Sq cm 6 01/18/24 1200   Number of days: 35       Incision/Surgical Site 03/21/22 Right;Lower Leg (Active)   Number of days: 668        Drainage: moderate  Thickness:  full  Duration of Need: 30 DAYS  Days Supply: 30 DAYS  Start Date: 1/18/2024  Starter Kit: Ancillary Kit (saline, gloves, gauze)  Qualifying wound/Debridement: Yes      Dressing Type Brand Size Frequency of change  Quantity   Primary Hydrofera blue ready transfer  4\"x5\" EVERY OTHER DAY and as needed     Latex free tubular compression  Size F DAILY and as needed      No substitutions preferred. Call [phone].         OK to forward to covered supplier.    Electronically Signed Physician:  RODRIGO MATTHEWS             Date: January 18, 2024    "

## 2024-01-18 NOTE — PROGRESS NOTES
Wound Clinic Note         Visit date: 01/18/2024       Cheif Complaint:     Elizabeth Kelley is a 78 year old   female had concerns including LLE wound.  The patient has lower extremity edema and right leg ulcers.          HISTORY OF PRESENT ILLNESS:    Elizabeth Kelley reports the wound has been present since 2020.  The wound began due to the area being bumped.   She has chronic lymphedema and works with the lymphedema clinic once a week.  The patient denies a history of congestive heart failure, previous vein procedures or previous DVT.         For most of the time since her last clinic visit with me she has been changing the bandages beginning with a Vashe soak followed Fibracol, dry dressing and a spandagrip stocking all changed every other day.  There is been light serous drainage from the wounds.  There has been no difficulties with the dressing changes.  However just in the last few days she ran out of bandages and has been using endoform, Ioplex, a dry dressing and the spandagrip stocking.        The pateint denies fevers or chills.  They report the pain from the wound has been 0/10 and has remained about the same recently.      The patient reports they only occasionally lay down to elevate her legs above the level of their heart, but not twice a day.    She reports recently she has not been as consistent about using her pneumatic lymphedema pump because she has been busy at work.      She confirms she takes Lasix once a day to help control her swelling.    Today the patient reports maintaining a high protein diet, but has not been taking protein supplements lately.        The patient denies a history of diabetes, smoking or chronic steroid use.         The patient has not had any symptoms of infection relating to the wound recently and is not currently on antibiotics.       Problem List:   Past Medical History:   Diagnosis Date    Ankle contracture, left     Class 3 severe obesity due to excess calories  without serious comorbidity with body mass index (BMI) of 45.0 to 49.9 in adult (H)     Combined gastric and duodenal ulcer     Controlled substance agreement broken     Depression     Dyslipidemia     Edema     Edema     Gait abnormality     GERD (gastroesophageal reflux disease)     Gout     Lymphedema of both lower extremities     Melanoma (H)     left upper arm    MS (multiple sclerosis) (H)     RLS (restless legs syndrome)     Skin ulcer of left lower leg (H)     Valgus deformity of both feet     Vitamin D deficiency              Family Hx: family history includes Arthritis in her maternal grandmother, maternal uncle, paternal grandfather, paternal grandmother, and sister; Asthma in her sister; Brain Cancer in her father; Breast Cancer in her paternal aunt; Colon Cancer in her paternal aunt; Early Death in her maternal grandfather; Hyperlipidemia in her mother, paternal aunt, and sister; Hypertension in her mother, paternal aunt, and sister; Multiple Sclerosis in her mother and sister; Obesity in her sister; Uterine Cancer in her mother.       Surgical Hx:   Past Surgical History:   Procedure Laterality Date    ABLATION SAPHENOUS VEIN W/ RFA Right 04/12/2021    Saphenous vein, anterior accessory saphenous vein, sclerotherapy of multiple veins.    COSMETIC SURGERY  1988    reduction of excess skin from weight loss    ESOPHAGOSCOPY, GASTROSCOPY, DUODENOSCOPY (EGD), COMBINED N/A 03/30/2015    Procedure: UPPER ENDOSCOPY with gastric biopsy;  Surgeon: Checo Fletcher MD;  Location: [facility];  Service:     IRRIGATION AND DEBRIDEMENT LOWER EXTREMITY, COMBINED Right 3/21/2022    Procedure: RIGHT LEG WOUND DEBRIDEMENT, PAULIE DERMATONE, MISONIX, VAC VERA FLOW WITH VASHE;  Surgeon: Gabriele Bullock MD;  Location:  OR    IRRIGATION AND DEBRIDEMENT LOWER EXTREMITY, COMBINED Right 3/28/2022    Procedure: DEBRIDEMENT AND WOUND DRESSING CHANGE AND MYRIAD APPLICATION OF RIGHT LOWER LEG WOUND;  Surgeon: Ara  Gabriele LARIOS MD;  Location: [facility] OR    MAMMOPLASTY REDUCTION Bilateral     MOHS MICROGRAPHIC PROCEDURE      left upper arm, melanoma    PICC SINGLE LUMEN PLACEMENT  8/13/2021         PICC SINGLE LUMEN PLACEMENT  9/2/2021         SPINE SURGERY      L5 area surgery, decompression          Allergies:    Allergies   Allergen Reactions    Other Environmental Allergy Anaphylaxis     Bees/Wasps Stings  Bees/Wasps Stings    Adhesive Tape Other (See Comments)     Red,itchy and oozes  Red,itchy and oozes    Adhesive [Cyanoacrylate] Unknown     Other reaction(s): sores    Oxycodone-Acetaminophen Rash    Vicodin [Hydrocodone-Acetaminophen] Nausea and Vomiting and Hives              Medication History:    Current Outpatient Medications   Medication Sig    acetaminophen (TYLENOL) 325 MG tablet 1-2 tablets as needed Orally every 4 hrs    ammonium lactate (AMLACTIN) 12 % external cream Apply topically daily to dry skin on feet/legs    ammonium lactate (AMLACTIN) 12 % external cream Apply topically daily Apply to bilateral feet/heels daily    Ascorbic Acid (VITAMIN C) 500 MG CHEW 1 tablet Orally Two times daily with meals    aspirin (ASPIRIN 81) 81 MG chewable tablet Take 1 tablet by mouth    aspirin 81 mg chewable tablet Take 81 mg by mouth every evening     atorvastatin (LIPITOR) 20 MG tablet Take 20 mg by mouth daily    benzonatate (TESSALON) 100 MG capsule Take 1 capsule (100 mg) by mouth 3 times daily as needed for cough    Bioflavonoid Products (CINDY-C) 500-550 MG TABS Take 1 tablet by mouth 2 times daily    buPROPion (WELLBUTRIN SR) 150 MG 12 hr tablet Take 150 mg by mouth every morning     calcium carbonate (TUMS) 500 MG chewable tablet Take 1 tablet (500 mg) by mouth daily as needed for heartburn    Carboxymethylcellulose Sodium 0.25 % SOLN Apply 0.05 mLs to eye    citalopram (CELEXA) 40 MG tablet Take 40 mg by mouth every morning     [employer] Super B Complex/C TABS Take 1 tablet by mouth daily    cyanocobalamin (VITAMIN B-12) 1000  MCG tablet Take 1 tablet (1,000 mcg) by mouth daily    cyclobenzaprine (FLEXERIL) 10 MG tablet Take 1 tablet (10 mg) by mouth every 8 hours as needed for muscle spasms    ferrous sulfate (FEROSUL) 325 (65 Fe) MG tablet Take 1 tablet (325 mg) by mouth daily    furosemide (LASIX) 40 MG tablet Take 1 tablet (40 mg) by mouth daily    gabapentin (NEURONTIN) 100 MG capsule TAKE 1 CAPSULE BY MOUTH 3 TIMES A DAY FOR 30 DAYS    gabapentin (NEURONTIN) 300 MG capsule TAKE 1 CAPSULE BY MOUTH THREE TIMES DAILY    gabapentin (NEURONTIN) 400 MG capsule Take 1 capsule (400 mg) by mouth 3 times daily    guaiFENesin (ROBITUSSIN) 20 mg/mL SOLN solution Take 10 mLs by mouth every 4 hours as needed for cough    HYDROmorphone (DILAUDID) 2 MG tablet Take 1 tablet (2 mg) by mouth every 4 hours as needed for moderate to severe pain    lidocaine (LIDODERM) 5 % patch 1 PATCH REMOVE AFTER 12 HOURS EXTERNALLY ONCE A DAY    magnesium oxide (MAG-OX) 400 MG tablet Take 1 tablet by mouth    metroNIDAZOLE (FLAGYL) 500 MG tablet Apply 1 tablet (500 mg) topically every other day    metroNIDAZOLE (FLAGYL) 500 MG tablet CRUSH 1 TABLET AND SPRINKLE ONTO THE WOUND EVERY DAY.    multivitamin w/minerals (CENTRUM ADULTS) tablet Take 1 tablet by mouth daily     nitroGLYcerin (NITROSTAT) 0.4 MG sublingual tablet PLACE 1 TABLET UNDER TONGUE EVERY 5 MINTUES AS NEEDED FOR CHEST PAIN FOR UP TO 30 DAYS    Nutritional Supplements (VARICOSE VEINS FORMULA OR) Take 1 tablet by mouth every morning    pantoprazole (PROTONIX) 40 MG EC tablet Take 1 tablet (40 mg) by mouth 2 times daily (before meals)    polyethylene glycol (MIRALAX) 17 GM/Dose powder 17 g    potassium chloride ER (KLOR-CON M) 20 MEQ CR tablet Take 20 mEq by mouth every evening (TAKE IN ADDITION TO AM/NOON DOSE FOR TOTAL 30mEq DAILY)    rOPINIRole (REQUIP) 0.5 MG tablet Take 1 tablet (0.5 mg) by mouth 2 times daily    rOPINIRole (REQUIP) 1 MG tablet Take 1 mg by mouth At Bedtime (TAKE IN ADDITION TO  "AM/NOON DOSE FOR TOTAL 2MG DAILY)    senna-docusate (SENNA S) 8.6-50 MG tablet Take 1 tablet by mouth as needed    simvastatin (ZOCOR) 40 MG tablet [SIMVASTATIN (ZOCOR) 40 MG TABLET] Take 40 mg by mouth bedtime.    Skin Protectants, Misc. (INTERDRY 10\"X36\") SHEE Externally apply 7 Units topically once as needed (change daily in groin rash/fold)    spironolactone (ALDACTONE) 25 MG tablet [SPIRONOLACTONE (ALDACTONE) 25 MG TABLET] Take 25 mg by mouth daily.    triamcinolone (ARISTOCORT HP) 0.5 % external cream Apply topically daily Apply to red/irritated skin around wound daily    triamcinolone (KENALOG) 0.1 % external cream Apply topically 2 times daily    triamcinolone (KENALOG) 0.1 % external cream Apply topically 2 times daily Apply to intact skin on right leg    triamcinolone (KENALOG) 0.1 % external ointment Apply topically every 48 hours To intact skin on right leg, as well as posterior calf on right leg redness    vitamin B-Complex Take 1 tablet by mouth daily    vitamin D3 (CHOLECALCIFEROL) 250 mcg (68128 units) capsule Take 1 capsule (250 mcg) by mouth daily    zinc 50 MG TABS Take 1 tablet by mouth     No current facility-administered medications for this visit.         Tobacco History:  reports that she quit smoking about 48 years ago. Her smoking use included cigarettes. She has a 3 pack-year smoking history. She has never used smokeless tobacco.       REVIEW OF SYMPTOMS:   The review of systems was negative except as noted in the HPI.           PHYSICAL EXAMINATION:     BP (!) 154/103   Pulse 82   Resp 16   SpO2 97%            GENERAL: The patient overall appears well and is no acute distress.   HEAD: normocephalic   EYES: Sclera and conjunctiva clear   NECK: no obvious masses   LUNGS: breathing is unlabored.   EXTREMITIES: No clubbing, cyanosis or edema   SKIN: No rashes or other abnormalities except as noted under the Wound section below.   NEUROLOGICAL: normal motor and sensory function   EDEMA: " Sever  and Lymphedema       WOUND: The wound appears healthy with no sign of infection.   Wound bed: necrotic material  Periwound: healthy intact skin  The wound measurements are overall about the same today compared with her last clinic visit.    Also see below for wound details:       Circumferential volume measures:            6/10/2021    11:00 AM 6/16/2021     1:00 PM 6/22/2021     1:00 PM 6/29/2021     2:00 PM 7/30/2021     1:00 PM   Circumferential Measures   Right just above MTP 25.5 23.6 24.8 24 23.3   Right Ankle 25.5 25.4 27 26 25.2   Right Widest Calf 61 56.6 58 59.5 61   Right Thigh Up 10cm 78    78.8   Left - just above MTP 23.5 22.6 23.8 23 24   Left Ankle 28 24.2 26.5 25.5 25   Left Widest Calf 54 53.6 54.6 53.5 51.5   Left Thigh Up 10cm 71   68.5 77.2       Ulceration(s)/Wound(s):   Please see the media tab under the chart review for pictures of the wounds.  Nursing staff removed dressings and cleansed wound.    VASC Wound Rt lateral leg (Active)   Pre Size Length 2.7 01/18/24 1200   Pre Size Width 1.8 01/18/24 1200   Pre Size Depth 0.1 01/18/24 1200   Pre Total Sq cm 4.86 01/18/24 1200       VASC Wound Rt medial leg (Active)   Pre Size Length 3 01/18/24 1200   Pre Size Width 2 01/18/24 1200   Pre Size Depth 0.1 01/18/24 1200   Pre Total Sq cm 6 01/18/24 1200                       Recent Labs   Lab Test 04/04/22  0523 06/23/16  1130   A1C 5.4 5.9          Recent Labs   Lab Test 02/10/23  1535 05/25/22  1637 04/04/22  0523   ALBUMIN 4.3 4.1 1.9*      October 27, 2021 ankle-brachial indices: Normal  October 27, 2021 right lower extremity venous insufficiency ultrasound: Normal      Procedure note:  I had previous obtain informed consent from the patient to perform serial debridements, I confirmed this again with the patient today verbally.  Anesthetized as needed with lidocaine.  Using a curette and/or a scalpel I performed an excisional debridement removing all necrotic material at the Right leg  wound in to the level of viable subcutaneous tissue.  I obtained hemostasis with direct pressure.  The patient tolerated the procedure well.  EBL: <5 ml  Total debridement surface area: Less than 20 cm                  ASSESSMENT:   This is a 78 year old  female with lower extremity edema and right leg ulcers.           PLAN:   We will bandage the open areas with a Vashe soak followed, Flagyl powder, Hydrofera Blue, a dry dressing and a double spandagrip stocking for added compression all changed every other day.        She previously had a bilateral lower extremity venous insufficiency ultrasound performed on August 3, 2023 which did show areas of superficial venous insufficiency.  She then later met with Dr. Floyd on November 13, 2023 to discuss treatment options for her venous insufficiency but he did not recommend any procedures at that time.    Separate from the discussion of her wound care we then discussed the treatment of her lower extremity edema.  I have again explained to the patient today that controlling the edema is probably the most important thing we can do to help heal the wound.  I have specifically recommended that they lay down with their legs above the level of the heart for 30 minutes at least twice a day.  I emphasized that if we can not control the edema we will likely not be able to get this wound to heal.    I have strongly encouraged her to use her pneumatic lymphedema pump once a day to help control her swelling.  I have encouraged the patient to continue on their high protein diet to aid in wound healing.   The patient will return to the wound clinic in 2-3 weeks to see me again.        30 minutes spent on the date of the encounter doing chart review, history and exam, documentation and further activities per the note      Lars Torres MD  01/18/2024   1:05 PM   [facility]  [phone]    This note was electronically signed by Lars Torres  MD

## 2024-02-05 NOTE — PATIENT INSTRUCTIONS
"Wound care supplies were not ordered or needed today.      Wound Care Instructions    EVERY OTHER DAY and as needed, Cleanse your right leg wound(s) with 10 minute vashe soak.    Pat Dry with non-sterile gauze    Primary Dressing: Apply hydrofera blue ready transfer into/onto the wounds    Secondary dressing: Cover with ABD or dry gauze    Secure with non-sterile roll gauze (4\" x 75\" roll) and tape (1\" roll tape) as needed; avoid adhesive directly on the skin    Compression: double tubigrip    It is ok to get your wound wet in the bath or shower    It is recommended that you elevate your legs throughout the day: approx 2-3 times each day  Elevate them above the level of your heart for 30 min.   Ways to do this:   Lay on the couch or your bed and prop your legs up on pillows   Recline back as far as you can go in your recliner and prop your legs on pillows.     Doing these things will help reduce the edema in your legs.      If for some reason you are not able to get your dressing(s) changed as outlined above (due to illness, lack of supplies, lack of help) please do the following: remove old, soiled dressings; wash the wounds with saline; pat dry; apply ABD pad or other absorbant pad and secure with rolled gauze; avoid tape directly on your skin; Call the clinic as soon as possible to let us know what the current issues are in receiving wound care [phone].      SEEK MEDICAL CARE IF:  You have an increase in swelling, pain, or redness around the wound.  You have an increase in the amount of pus coming from the wound.  There is a bad smell coming from the wound.  The wound appears to be worsening/enlarging  You have a fever greater than 101.5 F      It is ok to continue current wound care treatment/products for the next 2-3 days until new wound care supplies are ordered and arrive. If longer than this please contact our office at [phone].    If you have a 2 layer or 4 layer compression wrap on these are safe " to have on for ONLY 7 days. If for some reason you are not able to get the wrap(s) changed (due to illness; lack of supplies, lack of help, lack of transportation) please do the following: unwrap the old 2 or 4 layer compression wrap; avoid using scissors as you could cut your skin and cause wounds; use tubular compression when available. Call to reschedule your home care or clinic visit appointment as soon as possible.    Please NOTE: if you are 15 minutes late to your clinic appointment you will have to be rescheduled. Please call our clinic as soon as possible if you know you will not be able to get to your appointment at [phone].    If you fail to show up to 3 scheduled clinic appointments you will be dismissed from our clinic.              We want to hear from you!  In the next few weeks, you should receive a call or email to complete a survey about your visit at [facility]. Please help us improve your appointment experience by letting us know how we did today. We strive to make your experience good and value any ways in which we could do better.      We value your input and suggestions.    Thank you for choosing the [facility]!

## 2024-02-08 ENCOUNTER — OFFICE VISIT (OUTPATIENT)
Dept: VASCULAR SURGERY | Facility: CLINIC | Age: 79
End: 2024-02-08
Attending: FAMILY MEDICINE
Payer: COMMERCIAL

## 2024-02-08 VITALS
DIASTOLIC BLOOD PRESSURE: 73 MMHG | OXYGEN SATURATION: 100 % | RESPIRATION RATE: 18 BRPM | TEMPERATURE: 97.2 F | SYSTOLIC BLOOD PRESSURE: 149 MMHG | HEART RATE: 75 BPM

## 2024-02-08 DIAGNOSIS — R60.0 LOCALIZED EDEMA: Primary | ICD-10-CM

## 2024-02-08 DIAGNOSIS — L97.912 ULCER OF RIGHT LOWER EXTREMITY WITH FAT LAYER EXPOSED (H): ICD-10-CM

## 2024-02-08 PROCEDURE — 99215 OFFICE O/P EST HI 40 MIN: CPT | Mod: 25 | Performed by: FAMILY MEDICINE

## 2024-02-08 PROCEDURE — 99214 OFFICE O/P EST MOD 30 MIN: CPT | Mod: 25 | Performed by: FAMILY MEDICINE

## 2024-02-08 PROCEDURE — 11042 DBRDMT SUBQ TIS 1ST 20SQCM/<: CPT | Performed by: FAMILY MEDICINE

## 2024-02-08 ASSESSMENT — PAIN SCALES - GENERAL: PAINLEVEL: NO PAIN (0)

## 2024-02-08 NOTE — PROGRESS NOTES
Wound Clinic Note         Visit date: 02/08/2024       Cheif Complaint:     Elizabeth Kelley is a 78 year old   female had concerns including Wound Check (Right leg).  The patient has lower extremity edema and right leg ulcers.          HISTORY OF PRESENT ILLNESS:    Elizabeth Kellye reports the wound has been present since 2020.  The wound began due to the area being bumped.   She has chronic lymphedema and works with the lymphedema clinic once a week.  The patient denies a history of congestive heart failure, previous vein procedures or previous DVT.         For most of the time since her last clinic visit with me she has been changing the bandages beginning with a Vashe soak followed Flagyl powder, Hydrofera Blue, a dry dressing and a spandagrip stocking all changed every other day.  There is been light serous drainage from the wounds.  He reports the spandagrip stocking often does not stay in place very well and she has to readjust it several times throughout the day.  She uses an edema wear stocking on her left leg which stays in place much better.      The pateint denies fevers or chills.  They report the pain from the wound has been 0/10 and has remained about the same recently.      The patient reports propping up their legs occasionally during the day, but they do not elevate their legs above the level of their heart.     She reports she is only been able to use her pneumatic lymphedema pump once or twice a week recently because she has been busy with work.      She confirms she takes Lasix once a day to help control her swelling.    Today the patient reports maintaining a high protein diet, but has not been taking protein supplements lately.        The patient denies a history of diabetes, smoking or chronic steroid use.         The patient has not had any symptoms of infection relating to the wound recently and is not currently on antibiotics.       Problem List:   Past Medical History:   Diagnosis  Date    Ankle contracture, left     Class 3 severe obesity due to excess calories without serious comorbidity with body mass index (BMI) of 45.0 to 49.9 in adult (H)     Combined gastric and duodenal ulcer     Controlled substance agreement broken     Depression     Dyslipidemia     Edema     Edema     Gait abnormality     GERD (gastroesophageal reflux disease)     Gout     Lymphedema of both lower extremities     Melanoma (H)     left upper arm    MS (multiple sclerosis) (H)     RLS (restless legs syndrome)     Skin ulcer of left lower leg (H)     Valgus deformity of both feet     Vitamin D deficiency              Family Hx: family history includes Arthritis in her maternal grandmother, maternal uncle, paternal grandfather, paternal grandmother, and sister; Asthma in her sister; Brain Cancer in her father; Breast Cancer in her paternal aunt; Colon Cancer in her paternal aunt; Early Death in her maternal grandfather; Hyperlipidemia in her mother, paternal aunt, and sister; Hypertension in her mother, paternal aunt, and sister; Multiple Sclerosis in her mother and sister; Obesity in her sister; Uterine Cancer in her mother.       Surgical Hx:   Past Surgical History:   Procedure Laterality Date    ABLATION SAPHENOUS VEIN W/ RFA Right 04/12/2021    Saphenous vein, anterior accessory saphenous vein, sclerotherapy of multiple veins.    COSMETIC SURGERY  1988    reduction of excess skin from weight loss    ESOPHAGOSCOPY, GASTROSCOPY, DUODENOSCOPY (EGD), COMBINED N/A 03/30/2015    Procedure: UPPER ENDOSCOPY with gastric biopsy;  Surgeon: Checo Fletcher MD;  Location: [facility];  Service:     IRRIGATION AND DEBRIDEMENT LOWER EXTREMITY, COMBINED Right 3/21/2022    Procedure: RIGHT LEG WOUND DEBRIDEMENT, PAULIE DERMATONE, MISONIX, VAC VERA FLOW WITH VASHE;  Surgeon: Gabriele Bullock MD;  Location:  OR    IRRIGATION AND DEBRIDEMENT LOWER EXTREMITY, COMBINED Right 3/28/2022    Procedure: DEBRIDEMENT AND WOUND  DRESSING CHANGE AND MYRIAD APPLICATION OF RIGHT LOWER LEG WOUND;  Surgeon: Gabriele Bullock MD;  Location: [facility] OR    MAMMOPLASTY REDUCTION Bilateral     MOHS MICROGRAPHIC PROCEDURE      left upper arm, melanoma    PICC SINGLE LUMEN PLACEMENT  8/13/2021         PICC SINGLE LUMEN PLACEMENT  9/2/2021         SPINE SURGERY      L5 area surgery, decompression          Allergies:    Allergies   Allergen Reactions    Other Environmental Allergy Anaphylaxis     Bees/Wasps Stings  Bees/Wasps Stings    Adhesive Tape Other (See Comments)     Red,itchy and oozes  Red,itchy and oozes    Adhesive [Cyanoacrylate] Unknown     Other reaction(s): sores    Oxycodone-Acetaminophen Rash    Vicodin [Hydrocodone-Acetaminophen] Nausea and Vomiting and Hives              Medication History:    Current Outpatient Medications   Medication Sig    acetaminophen (TYLENOL) 325 MG tablet 1-2 tablets as needed Orally every 4 hrs    ammonium lactate (AMLACTIN) 12 % external cream Apply topically daily to dry skin on feet/legs    ammonium lactate (AMLACTIN) 12 % external cream Apply topically daily Apply to bilateral feet/heels daily    Ascorbic Acid (VITAMIN C) 500 MG CHEW 1 tablet Orally Two times daily with meals    aspirin (ASPIRIN 81) 81 MG chewable tablet Take 1 tablet by mouth    aspirin 81 mg chewable tablet Take 81 mg by mouth every evening     atorvastatin (LIPITOR) 20 MG tablet Take 20 mg by mouth daily    benzonatate (TESSALON) 100 MG capsule Take 1 capsule (100 mg) by mouth 3 times daily as needed for cough    Bioflavonoid Products (CINDY-C) 500-550 MG TABS Take 1 tablet by mouth 2 times daily    buPROPion (WELLBUTRIN SR) 150 MG 12 hr tablet Take 150 mg by mouth every morning     calcium carbonate (TUMS) 500 MG chewable tablet Take 1 tablet (500 mg) by mouth daily as needed for heartburn    Carboxymethylcellulose Sodium 0.25 % SOLN Apply 0.05 mLs to eye    citalopram (CELEXA) 40 MG tablet Take 40 mg by mouth every morning     [employer] Super B  Complex/C TABS Take 1 tablet by mouth daily    cyanocobalamin (VITAMIN B-12) 1000 MCG tablet Take 1 tablet (1,000 mcg) by mouth daily    cyclobenzaprine (FLEXERIL) 10 MG tablet Take 1 tablet (10 mg) by mouth every 8 hours as needed for muscle spasms    ferrous sulfate (FEROSUL) 325 (65 Fe) MG tablet Take 1 tablet (325 mg) by mouth daily    furosemide (LASIX) 40 MG tablet Take 1 tablet (40 mg) by mouth daily    gabapentin (NEURONTIN) 100 MG capsule TAKE 1 CAPSULE BY MOUTH 3 TIMES A DAY FOR 30 DAYS    gabapentin (NEURONTIN) 300 MG capsule TAKE 1 CAPSULE BY MOUTH THREE TIMES DAILY    gabapentin (NEURONTIN) 400 MG capsule Take 1 capsule (400 mg) by mouth 3 times daily    guaiFENesin (ROBITUSSIN) 20 mg/mL SOLN solution Take 10 mLs by mouth every 4 hours as needed for cough    HYDROmorphone (DILAUDID) 2 MG tablet Take 1 tablet (2 mg) by mouth every 4 hours as needed for moderate to severe pain    lidocaine (LIDODERM) 5 % patch 1 PATCH REMOVE AFTER 12 HOURS EXTERNALLY ONCE A DAY    magnesium oxide (MAG-OX) 400 MG tablet Take 1 tablet by mouth    metroNIDAZOLE (FLAGYL) 500 MG tablet Apply 1 tablet (500 mg) topically every other day    metroNIDAZOLE (FLAGYL) 500 MG tablet CRUSH 1 TABLET AND SPRINKLE ONTO THE WOUND EVERY DAY.    multivitamin w/minerals (CENTRUM ADULTS) tablet Take 1 tablet by mouth daily     nitroGLYcerin (NITROSTAT) 0.4 MG sublingual tablet PLACE 1 TABLET UNDER TONGUE EVERY 5 MINTUES AS NEEDED FOR CHEST PAIN FOR UP TO 30 DAYS    Nutritional Supplements (VARICOSE VEINS FORMULA OR) Take 1 tablet by mouth every morning    pantoprazole (PROTONIX) 40 MG EC tablet Take 1 tablet (40 mg) by mouth 2 times daily (before meals)    polyethylene glycol (MIRALAX) 17 GM/Dose powder 17 g    potassium chloride ER (KLOR-CON M) 20 MEQ CR tablet Take 20 mEq by mouth every evening (TAKE IN ADDITION TO AM/NOON DOSE FOR TOTAL 30mEq DAILY)    rOPINIRole (REQUIP) 0.5 MG tablet Take 1 tablet (0.5 mg) by mouth 2 times daily     "rOPINIRole (REQUIP) 1 MG tablet Take 1 mg by mouth At Bedtime (TAKE IN ADDITION TO AM/NOON DOSE FOR TOTAL 2MG DAILY)    senna-docusate (SENNA S) 8.6-50 MG tablet Take 1 tablet by mouth as needed    simvastatin (ZOCOR) 40 MG tablet [SIMVASTATIN (ZOCOR) 40 MG TABLET] Take 40 mg by mouth bedtime.    Skin Protectants, Misc. (INTERDRY 10\"X36\") SHEE Externally apply 7 Units topically once as needed (change daily in groin rash/fold)    spironolactone (ALDACTONE) 25 MG tablet [SPIRONOLACTONE (ALDACTONE) 25 MG TABLET] Take 25 mg by mouth daily.    triamcinolone (ARISTOCORT HP) 0.5 % external cream Apply topically daily Apply to red/irritated skin around wound daily    triamcinolone (KENALOG) 0.1 % external cream Apply topically 2 times daily    triamcinolone (KENALOG) 0.1 % external cream Apply topically 2 times daily Apply to intact skin on right leg    triamcinolone (KENALOG) 0.1 % external ointment Apply topically every 48 hours To intact skin on right leg, as well as posterior calf on right leg redness    vitamin B-Complex Take 1 tablet by mouth daily    vitamin D3 (CHOLECALCIFEROL) 250 mcg (06949 units) capsule Take 1 capsule (250 mcg) by mouth daily    zinc 50 MG TABS Take 1 tablet by mouth     No current facility-administered medications for this visit.         Tobacco History:  reports that she quit smoking about 48 years ago. Her smoking use included cigarettes. She has a 3 pack-year smoking history. She has never used smokeless tobacco.       REVIEW OF SYMPTOMS:   The review of systems was negative except as noted in the HPI.           PHYSICAL EXAMINATION:     BP (!) 149/73   Pulse 75   Temp 97.2  F (36.2  C)   Resp 18   SpO2 100%            GENERAL: The patient overall appears well and is no acute distress.   HEAD: normocephalic   EYES: Sclera and conjunctiva clear   NECK: no obvious masses   LUNGS: breathing is unlabored.   EXTREMITIES: No clubbing, cyanosis or edema   SKIN: No rashes or other abnormalities " except as noted under the Wound section below.   NEUROLOGICAL: normal motor and sensory function   EDEMA: Sever  and Lymphedema       WOUND: The wound appears healthy with no sign of infection.   Wound bed: necrotic material at the larger medial wound  Periwound: healthy intact skin  The smaller lateral wound is quite a bit smaller today compared with her last clinic visit.  The wound bed for the larger medial wound appears healthier with more granulation tissue but the wound measurement is still about the same.    Also see below for wound details:       Circumferential volume measures:            6/10/2021    11:00 AM 6/16/2021     1:00 PM 6/22/2021     1:00 PM 6/29/2021     2:00 PM 7/30/2021     1:00 PM   Circumferential Measures   Right just above MTP 25.5 23.6 24.8 24 23.3   Right Ankle 25.5 25.4 27 26 25.2   Right Widest Calf 61 56.6 58 59.5 61   Right Thigh Up 10cm 78    78.8   Left - just above MTP 23.5 22.6 23.8 23 24   Left Ankle 28 24.2 26.5 25.5 25   Left Widest Calf 54 53.6 54.6 53.5 51.5   Left Thigh Up 10cm 71   68.5 77.2       Ulceration(s)/Wound(s):   Please see the media tab under the chart review for pictures of the wounds.  Nursing staff removed dressings and cleansed wound.    VASC Wound Rt lateral leg (Active)   Pre Size Length 1.5 02/08/24 1136   Pre Size Width 1.4 02/08/24 1136   Pre Size Depth 0.2 02/08/24 1136   Pre Total Sq cm 2.1 02/08/24 1136   Post Size Length 1.8 02/08/24 1136   Post Size Width 1.5 02/08/24 1136   Post Size Depth 0.1 02/08/24 1136   Post Total Sq cm 2.7 02/08/24 1136       VASC Wound Rt medial leg (Active)   Pre Size Length 3.2 02/08/24 1136   Pre Size Width 2 02/08/24 1136   Pre Size Depth 0.1 02/08/24 1136   Pre Total Sq cm 6.4 02/08/24 1136   Post Size Length 3.2 02/08/24 1136   Post Size Width 1.7 02/08/24 1136   Post Size Depth 0.1 02/08/24 1136   Post Total Sq cm 5.44 02/08/24 1136                         Recent Labs   Lab Test 04/04/22  0523 06/23/16  1130   A1C  5.4 5.9          Recent Labs   Lab Test 02/10/23  1535 05/25/22  1637 04/04/22  0523   ALBUMIN 4.3 4.1 1.9*      October 27, 2021 ankle-brachial indices: Normal  October 27, 2021 right lower extremity venous insufficiency ultrasound: Normal      Procedure note:  I had previous obtain informed consent from the patient to perform serial debridements, I confirmed this again with the patient today verbally.  Anesthetized as needed with lidocaine.  Using a curette and/or a scalpel I performed an excisional debridement removing all necrotic material at the Right leg wound in to the level of viable subcutaneous tissue.  I obtained hemostasis with direct pressure.  The patient tolerated the procedure well.  EBL: <5 ml  Total debridement surface area: Less than 20 cm      The right lateral leg wound did not require debridement.          ASSESSMENT:   This is a 78 year old  female with lower extremity edema and right leg ulcers.           PLAN:   We will bandage the open areas with a Vashe soak followed, Flagyl powder, Hydrofera Blue, a dry dressing and a double spandagrip stocking for added compression all changed every other day.  Alternatively have suggested that she could buy a pair of edema wear stockings to use for the right leg for compression.      She previously had a bilateral lower extremity venous insufficiency ultrasound performed on August 3, 2023 which did show areas of superficial venous insufficiency.  She then later met with Dr. Floyd on November 13, 2023 to discuss treatment options for her venous insufficiency but he did not recommend any procedures at that time.    Separate from the discussion of her wound care we then discussed the treatment of her lower extremity edema.  I have again explained to the patient today that controlling the edema is probably the most important thing we can do to help heal the wound.  I have specifically recommended that they lay down with their legs above the level of the  heart for 30 minutes at least twice a day.  I emphasized that if we can not control the edema we will likely not be able to get this wound to heal.    I have strongly encouraged her to use her pneumatic lymphedema pump once a day to help control her swelling.  I have encouraged the patient to continue on their high protein diet to aid in wound healing.   The patient will return to the wound clinic in 3-4 weeks to see me again.        30 minutes spent on the date of the encounter doing chart review, history and exam, documentation and further activities per the note      Lars Torres MD  02/08/2024   1:40 PM   [facility]  [phone]    This note was electronically signed by Lars Torres MD

## 2024-02-13 ENCOUNTER — LAB REQUISITION (OUTPATIENT)
Dept: LAB | Facility: CLINIC | Age: 79
End: 2024-02-13
Payer: COMMERCIAL

## 2024-02-13 DIAGNOSIS — I10 ESSENTIAL (PRIMARY) HYPERTENSION: ICD-10-CM

## 2024-02-13 DIAGNOSIS — E78.2 MIXED HYPERLIPIDEMIA: ICD-10-CM

## 2024-02-13 LAB
ANION GAP SERPL CALCULATED.3IONS-SCNC: 12 MMOL/L (ref 7–15)
BUN SERPL-MCNC: 23.3 MG/DL (ref 8–23)
CALCIUM SERPL-MCNC: 9.8 MG/DL (ref 8.8–10.2)
CHLORIDE SERPL-SCNC: 102 MMOL/L (ref 98–107)
CHOLEST SERPL-MCNC: 207 MG/DL
CREAT SERPL-MCNC: 0.95 MG/DL (ref 0.51–0.95)
DEPRECATED HCO3 PLAS-SCNC: 26 MMOL/L (ref 22–29)
EGFRCR SERPLBLD CKD-EPI 2021: 62 ML/MIN/1.73M2
FASTING STATUS PATIENT QL REPORTED: ABNORMAL
GLUCOSE SERPL-MCNC: 97 MG/DL (ref 70–99)
HDLC SERPL-MCNC: 69 MG/DL
LDLC SERPL CALC-MCNC: 114 MG/DL
NONHDLC SERPL-MCNC: 138 MG/DL
POTASSIUM SERPL-SCNC: 4.3 MMOL/L (ref 3.4–5.3)
SODIUM SERPL-SCNC: 140 MMOL/L (ref 135–145)
TRIGL SERPL-MCNC: 122 MG/DL

## 2024-02-13 PROCEDURE — 80061 LIPID PANEL: CPT | Mod: ORL | Performed by: FAMILY MEDICINE

## 2024-02-13 PROCEDURE — 80048 BASIC METABOLIC PNL TOTAL CA: CPT | Mod: ORL | Performed by: FAMILY MEDICINE

## 2024-02-22 NOTE — PROGRESS NOTES
This is a recent snapshot of the patient's [facility] medical record.  For current drug dose and complete information and questions, call [phone] or In Basket pool, [facility] home infusion ([id number])  CSN Number:  [id number]      
no

## 2024-02-25 DIAGNOSIS — I89.0 ACQUIRED LYMPHEDEMA OF LEG: ICD-10-CM

## 2024-02-25 DIAGNOSIS — L97.912 ULCER OF RIGHT LOWER EXTREMITY WITH FAT LAYER EXPOSED (H): ICD-10-CM

## 2024-02-25 DIAGNOSIS — I87.303 VENOUS HYPERTENSION OF LOWER EXTREMITY, BILATERAL: ICD-10-CM

## 2024-02-27 NOTE — PATIENT INSTRUCTIONS
"We will order tubular compression size G supplies from [employer]. Please call customer service if you have any questions regarding your order or if you need to reorder supplies at [phone].      Wound Care Instructions    EVERY OTHER DAY and as needed, Cleanse your right leg and left leg wound(s) with 10 minute vashe soak.    Pat Dry with non-sterile gauze    Primary Dressing: Apply hydrofera blue ready transfer into/onto the wounds    Secondary dressing: Cover with ABD or dry gauze    Secure with non-sterile roll gauze (4\" x 75\" roll) and tape (1\" roll tape) as needed; avoid adhesive directly on the skin    Compression: tubular compression from foot to the knee and wear your thigh compression (edema wear) you have at home    SEEK MEDICAL CARE IF:  You have an increase in swelling, pain, or redness around the wound.  You have an increase in the amount of pus coming from the wound.  There is a bad smell coming from the wound.  The wound appears to be worsening/enlarging  You have a fever greater than 101.5 F    It is recommended that you elevate your legs throughout the day: approx 2-3 times each day  Elevate them above the level of your heart for 30 min.   Ways to do this:   Lay on the couch or your bed and prop your legs up on pillows   Recline back as far as you can go in your recliner and prop your legs on pillows.     Doing these things will help reduce the edema in your legs.      We want to hear from you!  In the next few weeks, you should receive a call or email to complete a survey about your visit at [facility]. Please help us improve your appointment experience by letting us know how we did today. We strive to make your experience good and value any ways in which we could do better.      We value your input and suggestions.    Thank you for choosing the [facility]!                  "

## 2024-03-07 ENCOUNTER — OFFICE VISIT (OUTPATIENT)
Dept: VASCULAR SURGERY | Facility: CLINIC | Age: 79
End: 2024-03-07
Attending: FAMILY MEDICINE
Payer: COMMERCIAL

## 2024-03-07 VITALS — SYSTOLIC BLOOD PRESSURE: 147 MMHG | HEART RATE: 74 BPM | OXYGEN SATURATION: 93 % | DIASTOLIC BLOOD PRESSURE: 79 MMHG

## 2024-03-07 DIAGNOSIS — L97.912 ULCER OF RIGHT LOWER EXTREMITY WITH FAT LAYER EXPOSED (H): ICD-10-CM

## 2024-03-07 DIAGNOSIS — R60.0 LOCALIZED EDEMA: Primary | ICD-10-CM

## 2024-03-07 PROCEDURE — 99215 OFFICE O/P EST HI 40 MIN: CPT | Performed by: FAMILY MEDICINE

## 2024-03-07 PROCEDURE — 99214 OFFICE O/P EST MOD 30 MIN: CPT | Performed by: FAMILY MEDICINE

## 2024-03-07 ASSESSMENT — PAIN SCALES - GENERAL: PAINLEVEL: NO PAIN (0)

## 2024-03-07 NOTE — LETTER
[facility]  [address] [place] [address] [address]  [place], [place] [address]  [phone]      Fax [phone]    [facility]           Fax: [phone]            Customer Service: [phone]        Account #: [id number]    Wound Dressing Rx and Order Form  Order Status: New order  Verbal: Monica  Date: 2024     Elizabeth Kelley  Gender: female  : 1945  [address]  [place] [address]  [phone] (home)     Medical Record: [id number]  Primary Care Provider: Francisco Ramirez      ICD-10-CM    1. Localized edema  R60.0 Wound care      2. Ulcer of right lower extremity with fat layer exposed (H)  L97.912 Wound care            Insurance Info:  INSURER: Payor: [email or web address] / Plan: [email or web address] COMMERCIAL / Product Type: HMO /   Policy ID#:  [id number]  SECONDARY INSURANCE:  [place]  Secondary Policy ID#:  [id number]        Physician Info:   Name:  RODRIGO MATTHEWS     Dept Address/Phones:   [address] [place] [address], [address]  [facility] [address] [phone]  Fax: [phone]    Lymphedema circumferential measurements (in cm):      6/10/2021    11:00 AM 2021     1:00 PM 2021     1:00 PM 2021     2:00 PM 2021     1:00 PM   Circumferential Measures   Right just above MTP 25.5 23.6 24.8 24 23.3   Right Ankle 25.5 25.4 27 26 25.2   Right Widest Calf 61 56.6 58 59.5 61   Right Thigh Up 10cm 78    78.8   Left - just above MTP 23.5 22.6 23.8 23 24   Left Ankle 28 24.2 26.5 25.5 25   Left Widest Calf 54 53.6 54.6 53.5 51.5   Left Thigh Up 10cm 71   68.5 77.2         Wound info:  Wound Leg Other (comment);Ulceration (Active)   Number of days: 960       Wound (used by OP [facility] only) 03/10/22 0900 Right lower;anterior leg ulceration, venous (Active)   Number of days: 728       Wound (used by OP I only) 23 1550 Right leg (Active)   Number of days: 409       Wound (used by OP I only) 23 1101 Right medial;lower  leg (Active)   Number of days: 280       VASC Wound right lower leg (Active)   Pre Size Length 17 02/02/22 1500   Pre Size Width 35 02/02/22 1500   Pre Size Depth 0.1 02/02/22 1500   Pre Total Sq cm 595 02/02/22 1500   Number of days: 1044       VASC Wound Rt lateral leg (Active)   Pre Size Length 1.5 02/08/24 1136   Pre Size Width 1.4 02/08/24 1136   Pre Size Depth 0.2 02/08/24 1136   Pre Total Sq cm 2.1 02/08/24 1136   Post Size Length 1.8 02/08/24 1136   Post Size Width 1.5 02/08/24 1136   Post Size Depth 0.1 02/08/24 1136   Post Total Sq cm 2.7 02/08/24 1136   Number of days: 84       VASC Wound Rt medial leg (Active)   Pre Size Length 4 03/07/24 1100   Pre Size Width 2.6 03/07/24 1100   Pre Size Depth 0.1 03/07/24 1100   Pre Total Sq cm 6.4 02/08/24 1136   Post Size Length 3.2 02/08/24 1136   Post Size Width 1.7 02/08/24 1136   Post Size Depth 0.1 02/08/24 1136   Post Total Sq cm 5.44 02/08/24 1136   Number of days: 84       VASC Wound Left shin (Active)   Pre Size Length 1.8 03/07/24 1100   Pre Size Width 1.5 03/07/24 1100   Pre Size Depth 0.1 03/07/24 1100   Pre Total Sq cm 2.7 03/07/24 1100   Number of days: 0       VASC Wound Left Lateral leg (Active)   Pre Size Length 1 03/07/24 1100   Pre Size Width 1.8 03/07/24 1100   Pre Size Depth 0.1 03/07/24 1100   Pre Total Sq cm 1.8 03/07/24 1100   Number of days: 0       Incision/Surgical Site 03/21/22 Right;Lower Leg (Active)   Number of days: 717        Drainage: moderate  Thickness:  full  Duration of Need: 30 DAYS  Days Supply: 30 DAYS  Start Date: 3/7/2024  Starter Kit, Ancillary Kit (saline, gloves, gauze): No   Qualifying wound/Debridement: Yes     NO SUBSTITUTIONS. Call [phone].       Dressing Type Brand Size Frequency of change  Quantity     Latex free tubular compression   Size G DAILY and as needed  1 box     NO SUBSTITUTIONS. Call [phone] with questions.       OK to forward to covered supplier.    Electronically Signed Physician:  BYRON  RODRIGO ZAPATA             Date: March 7, 2024

## 2024-03-07 NOTE — PROGRESS NOTES
Wound Clinic Note         Visit date: 03/07/2024       Cheif Complaint:     Elizabeth Kelley is a 78 year old   female had concerns including Wound Check (Bilateral legs/).  The patient has lower extremity edema and bilateral leg ulcers         HISTORY OF PRESENT ILLNESS:    Elizabeth Kelley reports the right leg wound has been present since 2020.  The wound began due to the area being bumped.     The patient denies a history of congestive heart failure, previous vein procedures or previous DVT.       She reports just in the last week she was scrubbing her left leg with a loofah pad in the shower and caused 2 new areas to open up on her left leg.    Since her last clinic visit with me she has been changing the bandages beginning with a Vashe soak followed Flagyl powder, Hydrofera Blue, a dry dressing and a spandagrip stocking all changed every other day.  There is been light serous drainage from the wounds.  He reports the spandagrip stocking often does not stay in place very well and she has to readjust it several times throughout the day.        The pateint denies fevers or chills.  They report the pain from the wound has been 0/10 and has remained about the same recently.      The patient reports laying down to elevate their legs above the level of their heart at least twice a day.     She has not been using her pneumatic lymphedema pump since her new left leg wound started.      She confirms she takes Lasix once a day to help control her swelling.    Today the patient reports maintaining a high protein diet, but has not been taking protein supplements lately.        The patient denies a history of diabetes, smoking or chronic steroid use.         The patient has not had any symptoms of infection relating to the wound recently and is not currently on antibiotics.       Problem List:   Past Medical History:   Diagnosis Date    Ankle contracture, left     Class 3 severe obesity due to excess calories without  serious comorbidity with body mass index (BMI) of 45.0 to 49.9 in adult (H)     Combined gastric and duodenal ulcer     Controlled substance agreement broken     Depression     Dyslipidemia     Edema     Edema     Gait abnormality     GERD (gastroesophageal reflux disease)     Gout     Lymphedema of both lower extremities     Melanoma (H)     left upper arm    MS (multiple sclerosis) (H)     RLS (restless legs syndrome)     Skin ulcer of left lower leg (H)     Valgus deformity of both feet     Vitamin D deficiency              Family Hx: family history includes Arthritis in her maternal grandmother, maternal uncle, paternal grandfather, paternal grandmother, and sister; Asthma in her sister; Brain Cancer in her father; Breast Cancer in her paternal aunt; Colon Cancer in her paternal aunt; Early Death in her maternal grandfather; Hyperlipidemia in her mother, paternal aunt, and sister; Hypertension in her mother, paternal aunt, and sister; Multiple Sclerosis in her mother and sister; Obesity in her sister; Uterine Cancer in her mother.       Surgical Hx:   Past Surgical History:   Procedure Laterality Date    ABLATION SAPHENOUS VEIN W/ RFA Right 04/12/2021    Saphenous vein, anterior accessory saphenous vein, sclerotherapy of multiple veins.    COSMETIC SURGERY  1988    reduction of excess skin from weight loss    ESOPHAGOSCOPY, GASTROSCOPY, DUODENOSCOPY (EGD), COMBINED N/A 03/30/2015    Procedure: UPPER ENDOSCOPY with gastric biopsy;  Surgeon: Checo Fletcher MD;  Location: [facility];  Service:     IRRIGATION AND DEBRIDEMENT LOWER EXTREMITY, COMBINED Right 3/21/2022    Procedure: RIGHT LEG WOUND DEBRIDEMENT, PAULIE DERMATONE, MISONIX, VAC VERA FLOW WITH VASHE;  Surgeon: Gabriele Bullock MD;  Location:  OR    IRRIGATION AND DEBRIDEMENT LOWER EXTREMITY, COMBINED Right 3/28/2022    Procedure: DEBRIDEMENT AND WOUND DRESSING CHANGE AND MYRIAD APPLICATION OF RIGHT LOWER LEG WOUND;  Surgeon: Gabriele Bullock  MD;  Location: [facility] OR    MAMMOPLASTY REDUCTION Bilateral     MOHS MICROGRAPHIC PROCEDURE      left upper arm, melanoma    PICC SINGLE LUMEN PLACEMENT  8/13/2021         PICC SINGLE LUMEN PLACEMENT  9/2/2021         SPINE SURGERY      L5 area surgery, decompression          Allergies:    Allergies   Allergen Reactions    Other Environmental Allergy Anaphylaxis     Bees/Wasps Stings  Bees/Wasps Stings    Adhesive Tape Other (See Comments)     Red,itchy and oozes  Red,itchy and oozes    Adhesive [Cyanoacrylate] Unknown     Other reaction(s): sores    Oxycodone-Acetaminophen Rash    Vicodin [Hydrocodone-Acetaminophen] Nausea and Vomiting and Hives              Medication History:    Current Outpatient Medications   Medication Sig    acetaminophen (TYLENOL) 325 MG tablet 1-2 tablets as needed Orally every 4 hrs    ammonium lactate (AMLACTIN) 12 % external cream Apply topically daily to dry skin on feet/legs    ammonium lactate (AMLACTIN) 12 % external cream Apply topically daily Apply to bilateral feet/heels daily    Ascorbic Acid (VITAMIN C) 500 MG CHEW 1 tablet Orally Two times daily with meals    aspirin (ASPIRIN 81) 81 MG chewable tablet Take 1 tablet by mouth    aspirin 81 mg chewable tablet Take 81 mg by mouth every evening     atorvastatin (LIPITOR) 20 MG tablet Take 20 mg by mouth daily    benzonatate (TESSALON) 100 MG capsule Take 1 capsule (100 mg) by mouth 3 times daily as needed for cough    Bioflavonoid Products (CINDY-C) 500-550 MG TABS Take 1 tablet by mouth 2 times daily    buPROPion (WELLBUTRIN SR) 150 MG 12 hr tablet Take 150 mg by mouth every morning     calcium carbonate (TUMS) 500 MG chewable tablet Take 1 tablet (500 mg) by mouth daily as needed for heartburn    Carboxymethylcellulose Sodium 0.25 % SOLN Apply 0.05 mLs to eye    citalopram (CELEXA) 40 MG tablet Take 40 mg by mouth every morning     [employer] Super B Complex/C TABS Take 1 tablet by mouth daily    cyanocobalamin (VITAMIN B-12) 1000 MCG tablet  Take 1 tablet (1,000 mcg) by mouth daily    cyclobenzaprine (FLEXERIL) 10 MG tablet Take 1 tablet (10 mg) by mouth every 8 hours as needed for muscle spasms    ferrous sulfate (FEROSUL) 325 (65 Fe) MG tablet Take 1 tablet (325 mg) by mouth daily    furosemide (LASIX) 40 MG tablet Take 1 tablet (40 mg) by mouth daily    gabapentin (NEURONTIN) 100 MG capsule TAKE 1 CAPSULE BY MOUTH 3 TIMES A DAY FOR 30 DAYS    gabapentin (NEURONTIN) 300 MG capsule TAKE 1 CAPSULE BY MOUTH THREE TIMES DAILY    gabapentin (NEURONTIN) 400 MG capsule Take 1 capsule (400 mg) by mouth 3 times daily    guaiFENesin (ROBITUSSIN) 20 mg/mL SOLN solution Take 10 mLs by mouth every 4 hours as needed for cough    HYDROmorphone (DILAUDID) 2 MG tablet Take 1 tablet (2 mg) by mouth every 4 hours as needed for moderate to severe pain    lidocaine (LIDODERM) 5 % patch 1 PATCH REMOVE AFTER 12 HOURS EXTERNALLY ONCE A DAY    magnesium oxide (MAG-OX) 400 MG tablet Take 1 tablet by mouth    metroNIDAZOLE (FLAGYL) 500 MG tablet Apply 1 tablet (500 mg) topically every other day    metroNIDAZOLE (FLAGYL) 500 MG tablet CRUSH 1 TABLET AND SPRINKLE ONTO THE WOUND EVERY DAY.    multivitamin w/minerals (CENTRUM ADULTS) tablet Take 1 tablet by mouth daily     nitroGLYcerin (NITROSTAT) 0.4 MG sublingual tablet PLACE 1 TABLET UNDER TONGUE EVERY 5 MINTUES AS NEEDED FOR CHEST PAIN FOR UP TO 30 DAYS    Nutritional Supplements (VARICOSE VEINS FORMULA OR) Take 1 tablet by mouth every morning    pantoprazole (PROTONIX) 40 MG EC tablet Take 1 tablet (40 mg) by mouth 2 times daily (before meals)    polyethylene glycol (MIRALAX) 17 GM/Dose powder 17 g    potassium chloride ER (KLOR-CON M) 20 MEQ CR tablet Take 20 mEq by mouth every evening (TAKE IN ADDITION TO AM/NOON DOSE FOR TOTAL 30mEq DAILY)    rOPINIRole (REQUIP) 0.5 MG tablet Take 1 tablet (0.5 mg) by mouth 2 times daily    rOPINIRole (REQUIP) 1 MG tablet Take 1 mg by mouth At Bedtime (TAKE IN ADDITION TO AM/NOON DOSE FOR  "TOTAL 2MG DAILY)    senna-docusate (SENNA S) 8.6-50 MG tablet Take 1 tablet by mouth as needed    simvastatin (ZOCOR) 40 MG tablet [SIMVASTATIN (ZOCOR) 40 MG TABLET] Take 40 mg by mouth bedtime.    Skin Protectants, Misc. (INTERDRY 10\"X36\") SHEE Externally apply 7 Units topically once as needed (change daily in groin rash/fold)    spironolactone (ALDACTONE) 25 MG tablet [SPIRONOLACTONE (ALDACTONE) 25 MG TABLET] Take 25 mg by mouth daily.    triamcinolone (ARISTOCORT HP) 0.5 % external cream Apply topically daily Apply to red/irritated skin around wound daily    triamcinolone (KENALOG) 0.1 % external cream Apply topically 2 times daily    triamcinolone (KENALOG) 0.1 % external cream Apply topically 2 times daily Apply to intact skin on right leg    triamcinolone (KENALOG) 0.1 % external ointment Apply topically every 48 hours To intact skin on right leg, as well as posterior calf on right leg redness    vitamin B-Complex Take 1 tablet by mouth daily    vitamin D3 (CHOLECALCIFEROL) 250 mcg (28682 units) capsule Take 1 capsule (250 mcg) by mouth daily    zinc 50 MG TABS Take 1 tablet by mouth     No current facility-administered medications for this visit.         Tobacco History:  reports that she quit smoking about 48 years ago. Her smoking use included cigarettes. She has a 3 pack-year smoking history. She has never used smokeless tobacco.       REVIEW OF SYMPTOMS:   The review of systems was negative except as noted in the HPI.           PHYSICAL EXAMINATION:     BP (!) 147/79   Pulse 74   SpO2 93%            GENERAL: The patient overall appears well and is no acute distress.   HEAD: normocephalic   EYES: Sclera and conjunctiva clear   NECK: no obvious masses   LUNGS: breathing is unlabored.   EXTREMITIES: No clubbing, cyanosis or edema   SKIN: No rashes or other abnormalities except as noted under the Wound section below.   NEUROLOGICAL: normal motor and sensory function   EDEMA: Sever  and Lymphedema   "     WOUND: The wound appears healthy with no sign of infection.   Wound bed: granulation tissue   Periwound: healthy intact skin  The right leg wound is quite a bit smaller today with a significant section of the wound completely healed and there is no longer necrotic material in the wound bed.  The 2 new wounds that she has on the left leg are very superficial consistent with minor abrasions.    Also see below for wound details:       Circumferential volume measures:            6/10/2021    11:00 AM 6/16/2021     1:00 PM 6/22/2021     1:00 PM 6/29/2021     2:00 PM 7/30/2021     1:00 PM   Circumferential Measures   Right just above MTP 25.5 23.6 24.8 24 23.3   Right Ankle 25.5 25.4 27 26 25.2   Right Widest Calf 61 56.6 58 59.5 61   Right Thigh Up 10cm 78    78.8   Left - just above MTP 23.5 22.6 23.8 23 24   Left Ankle 28 24.2 26.5 25.5 25   Left Widest Calf 54 53.6 54.6 53.5 51.5   Left Thigh Up 10cm 71   68.5 77.2       Ulceration(s)/Wound(s):   Please see the media tab under the chart review for pictures of the wounds.  Nursing staff removed dressings and cleansed wound.    VASC Wound Rt medial leg (Active)   Pre Size Length 4 03/07/24 1100   Pre Size Width 2.6 03/07/24 1100   Pre Size Depth 0.1 03/07/24 1100       VASC Wound Left shin (Active)   Pre Size Length 1.8 03/07/24 1100   Pre Size Width 1.5 03/07/24 1100   Pre Size Depth 0.1 03/07/24 1100   Pre Total Sq cm 2.7 03/07/24 1100       VASC Wound Left Lateral leg (Active)   Pre Size Length 1 03/07/24 1100   Pre Size Width 1.8 03/07/24 1100   Pre Size Depth 0.1 03/07/24 1100   Pre Total Sq cm 1.8 03/07/24 1100                           Recent Labs   Lab Test 04/04/22  0523 06/23/16  1130   A1C 5.4 5.9          Recent Labs   Lab Test 02/10/23  1535 05/25/22  1637 04/04/22  0523   ALBUMIN 4.3 4.1 1.9*      October 27, 2021 ankle-brachial indices: Normal  October 27, 2021 right lower extremity venous insufficiency ultrasound: Normal      No sharp debridement  performed today.            ASSESSMENT:   This is a 78 year old  female with lower extremity edema and right leg ulcers.           PLAN:   We will bandage the open areas with a Vashe soak followed, Flagyl powder, Hydrofera Blue, a dry dressing and will switch to using 2 different sizes of Tubigrip stockings so that they stay in place better all changed every other day.          She previously had a bilateral lower extremity venous insufficiency ultrasound performed on August 3, 2023 which did show areas of superficial venous insufficiency.  She then later met with Dr. Floyd on November 13, 2023 to discuss treatment options for her venous insufficiency but he did not recommend any procedures at that time.    Separate from the discussion of her wound care we then discussed the treatment of her lower extremity edema.  I have again explained to the patient today that controlling the edema is probably the most important thing we can do to help heal the wound.  I have specifically recommended that they lay down with their legs above the level of the heart for 30 minutes at least twice a day.  I emphasized that if we can not control the edema we will likely not be able to get this wound to heal.    I have strongly encouraged her to resume using her pneumatic lymphedema pump once a day to help control her swelling.  I have encouraged the patient to continue on their high protein diet to aid in wound healing.   The patient will return to the wound clinic in 3-4 weeks to see me again.        30 minutes spent on the date of the encounter doing chart review, history and exam, documentation and further activities per the note      Lars Torres MD  03/07/2024   12:41 PM   [facility]  [phone]    This note was electronically signed by Lars Torres MD

## 2024-03-27 NOTE — PATIENT INSTRUCTIONS
"  Wound Care Instructions    EVERY OTHER DAY and as needed, Cleanse your right leg and left leg wound(s) with 10 minute vashe soak.    Pat Dry with non-sterile gauze, apply desitin around your wounds    Primary Dressing: Apply hydrofera blue ready transfer into/onto the wounds    Secondary dressing: Cover with ABD or dry gauze    Secure with non-sterile roll gauze (4\" x 75\" roll) and tape (1\" roll tape) as needed; avoid adhesive directly on the skin    Compression: tubular compression from foot to the knee and wear your thigh compression (edema wear) you have at home    SEEK MEDICAL CARE IF:  You have an increase in swelling, pain, or redness around the wound.  You have an increase in the amount of pus coming from the wound.  There is a bad smell coming from the wound.  The wound appears to be worsening/enlarging  You have a fever greater than 101.5 F    It is recommended that you elevate your legs throughout the day: approx 2-3 times each day  Elevate them above the level of your heart for 30 min.   Ways to do this:   Lay on the couch or your bed and prop your legs up on pillows   Recline back as far as you can go in your recliner and prop your legs on pillows.     Doing these things will help reduce the edema in your legs.      We want to hear from you!  In the next few weeks, you should receive a call or email to complete a survey about your visit at [facility]. Please help us improve your appointment experience by letting us know how we did today. We strive to make your experience good and value any ways in which we could do better.      We value your input and suggestions.    Thank you for choosing the [facility]!                "

## 2024-04-04 ENCOUNTER — OFFICE VISIT (OUTPATIENT)
Dept: VASCULAR SURGERY | Facility: CLINIC | Age: 79
End: 2024-04-04
Attending: FAMILY MEDICINE
Payer: COMMERCIAL

## 2024-04-04 VITALS — HEART RATE: 72 BPM | DIASTOLIC BLOOD PRESSURE: 77 MMHG | SYSTOLIC BLOOD PRESSURE: 155 MMHG | OXYGEN SATURATION: 97 %

## 2024-04-04 DIAGNOSIS — R60.0 LOCALIZED EDEMA: Primary | ICD-10-CM

## 2024-04-04 DIAGNOSIS — L97.912 ULCER OF RIGHT LOWER EXTREMITY WITH FAT LAYER EXPOSED (H): ICD-10-CM

## 2024-04-04 PROCEDURE — 99214 OFFICE O/P EST MOD 30 MIN: CPT | Mod: 25 | Performed by: FAMILY MEDICINE

## 2024-04-04 PROCEDURE — 11042 DBRDMT SUBQ TIS 1ST 20SQCM/<: CPT | Performed by: FAMILY MEDICINE

## 2024-04-04 ASSESSMENT — PAIN SCALES - GENERAL: PAINLEVEL: NO PAIN (0)

## 2024-04-04 NOTE — PROGRESS NOTES
Wound Clinic Note         Visit date: 04/04/2024       Cheif Complaint:     Elizabeth Kelley is a 78 year old   female had concerns including bilateral leg wounds .  The patient has lower extremity edema and bilateral leg ulcers         HISTORY OF PRESENT ILLNESS:    Elizabeth Kelley reports the right leg wound has been present since 2020.  The wound began due to the area being bumped.     The patient denies a history of congestive heart failure, previous vein procedures or previous DVT.       She reports just in the last week she was scrubbing her left leg with a loofah pad in the shower and caused 2 new areas to open up on her left leg.    Since her last clinic visit with me she has been changing the bandages beginning with a Vashe soak followed Flagyl powder, Hydrofera Blue, a dry dressing and either spandagrip stocking or an edema wear stocking all changed every other day.  There is been moderate to heavy serous drainage from the wounds.  She notes that recently the drainage is increased from both of the legs.  At her last clinic visit we suggested using 2 different sizes of Tubigrip on her right leg to apply compression more effectively over there, she reports that has been working much better.      The pateint denies fevers or chills.  They report the pain from the wound has been 0/10 and has remained about the same recently.      The patient reports they currently do not have any routine for elevating their legs.     She also admits she has not been using her pneumatic lymphedema pumps recently.    She confirms she takes Lasix once a day to help control her swelling.    Today the patient reports maintaining a high protein diet, but has not been taking protein supplements lately.        The patient denies a history of diabetes, smoking or chronic steroid use.         The patient has not had any symptoms of infection relating to the wound recently and is not currently on antibiotics.       Problem List:    Past Medical History:   Diagnosis Date    Ankle contracture, left     Class 3 severe obesity due to excess calories without serious comorbidity with body mass index (BMI) of 45.0 to 49.9 in adult (H)     Combined gastric and duodenal ulcer     Controlled substance agreement broken     Depression     Dyslipidemia     Edema     Edema     Gait abnormality     GERD (gastroesophageal reflux disease)     Gout     Lymphedema of both lower extremities     Melanoma (H)     left upper arm    MS (multiple sclerosis) (H)     RLS (restless legs syndrome)     Skin ulcer of left lower leg (H)     Valgus deformity of both feet     Vitamin D deficiency              Family Hx: family history includes Arthritis in her maternal grandmother, maternal uncle, paternal grandfather, paternal grandmother, and sister; Asthma in her sister; Brain Cancer in her father; Breast Cancer in her paternal aunt; Colon Cancer in her paternal aunt; Early Death in her maternal grandfather; Hyperlipidemia in her mother, paternal aunt, and sister; Hypertension in her mother, paternal aunt, and sister; Multiple Sclerosis in her mother and sister; Obesity in her sister; Uterine Cancer in her mother.       Surgical Hx:   Past Surgical History:   Procedure Laterality Date    ABLATION SAPHENOUS VEIN W/ RFA Right 04/12/2021    Saphenous vein, anterior accessory saphenous vein, sclerotherapy of multiple veins.    COSMETIC SURGERY  1988    reduction of excess skin from weight loss    ESOPHAGOSCOPY, GASTROSCOPY, DUODENOSCOPY (EGD), COMBINED N/A 03/30/2015    Procedure: UPPER ENDOSCOPY with gastric biopsy;  Surgeon: Checo Fletcher MD;  Location: [facility];  Service:     IRRIGATION AND DEBRIDEMENT LOWER EXTREMITY, COMBINED Right 3/21/2022    Procedure: RIGHT LEG WOUND DEBRIDEMENT, PAULIE DERMATONE, MISONIX, VAC VERA FLOW WITH VASHE;  Surgeon: Gabriele Bullock MD;  Location:  OR    IRRIGATION AND DEBRIDEMENT LOWER EXTREMITY, COMBINED Right 3/28/2022     Procedure: DEBRIDEMENT AND WOUND DRESSING CHANGE AND MYRIAD APPLICATION OF RIGHT LOWER LEG WOUND;  Surgeon: Gabriele Bullock MD;  Location: [facility] OR    MAMMOPLASTY REDUCTION Bilateral     MOHS MICROGRAPHIC PROCEDURE      left upper arm, melanoma    PICC SINGLE LUMEN PLACEMENT  8/13/2021         PICC SINGLE LUMEN PLACEMENT  9/2/2021         SPINE SURGERY      L5 area surgery, decompression          Allergies:    Allergies   Allergen Reactions    Other Environmental Allergy Anaphylaxis     Bees/Wasps Stings  Bees/Wasps Stings    Adhesive Tape Other (See Comments)     Red,itchy and oozes  Red,itchy and oozes    Adhesive [Cyanoacrylate] Unknown     Other reaction(s): sores    Oxycodone-Acetaminophen Rash    Vicodin [Hydrocodone-Acetaminophen] Nausea and Vomiting and Hives              Medication History:    Current Outpatient Medications   Medication Sig Dispense Refill    acetaminophen (TYLENOL) 325 MG tablet 1-2 tablets as needed Orally every 4 hrs      ammonium lactate (AMLACTIN) 12 % external cream Apply topically daily to dry skin on feet/legs 385 g 1    ammonium lactate (AMLACTIN) 12 % external cream Apply topically daily Apply to bilateral feet/heels daily 385 g 1    Ascorbic Acid (VITAMIN C) 500 MG CHEW 1 tablet Orally Two times daily with meals      aspirin (ASPIRIN 81) 81 MG chewable tablet Take 1 tablet by mouth      aspirin 81 mg chewable tablet Take 81 mg by mouth every evening       atorvastatin (LIPITOR) 20 MG tablet Take 20 mg by mouth daily      benzonatate (TESSALON) 100 MG capsule Take 1 capsule (100 mg) by mouth 3 times daily as needed for cough      Bioflavonoid Products (CINDY-C) 500-550 MG TABS Take 1 tablet by mouth 2 times daily 60 tablet 3    buPROPion (WELLBUTRIN SR) 150 MG 12 hr tablet Take 150 mg by mouth every morning       calcium carbonate (TUMS) 500 MG chewable tablet Take 1 tablet (500 mg) by mouth daily as needed for heartburn      Carboxymethylcellulose Sodium 0.25 % SOLN Apply 0.05 mLs  to eye      citalopram (CELEXA) 40 MG tablet Take 40 mg by mouth every morning       [employer] Super B Complex/C TABS Take 1 tablet by mouth daily      cyanocobalamin (VITAMIN B-12) 1000 MCG tablet Take 1 tablet (1,000 mcg) by mouth daily 60 tablet 3    cyclobenzaprine (FLEXERIL) 10 MG tablet Take 1 tablet (10 mg) by mouth every 8 hours as needed for muscle spasms      ferrous sulfate (FEROSUL) 325 (65 Fe) MG tablet Take 1 tablet (325 mg) by mouth daily      furosemide (LASIX) 40 MG tablet Take 1 tablet (40 mg) by mouth daily      gabapentin (NEURONTIN) 100 MG capsule TAKE 1 CAPSULE BY MOUTH 3 TIMES A DAY FOR 30 DAYS      gabapentin (NEURONTIN) 300 MG capsule TAKE 1 CAPSULE BY MOUTH THREE TIMES DAILY      gabapentin (NEURONTIN) 400 MG capsule Take 1 capsule (400 mg) by mouth 3 times daily      guaiFENesin (ROBITUSSIN) 20 mg/mL SOLN solution Take 10 mLs by mouth every 4 hours as needed for cough      HYDROmorphone (DILAUDID) 2 MG tablet Take 1 tablet (2 mg) by mouth every 4 hours as needed for moderate to severe pain 12 tablet 0    lidocaine (LIDODERM) 5 % patch 1 PATCH REMOVE AFTER 12 HOURS EXTERNALLY ONCE A DAY      magnesium oxide (MAG-OX) 400 MG tablet Take 1 tablet by mouth      metroNIDAZOLE (FLAGYL) 500 MG tablet Apply 1 tablet (500 mg) topically every other day 90 tablet 0    metroNIDAZOLE (FLAGYL) 500 MG tablet CRUSH 1 TABLET AND SPRINKLE ONTO THE WOUND EVERY DAY. 90 tablet 0    multivitamin w/minerals (CENTRUM ADULTS) tablet Take 1 tablet by mouth daily       nitroGLYcerin (NITROSTAT) 0.4 MG sublingual tablet PLACE 1 TABLET UNDER TONGUE EVERY 5 MINTUES AS NEEDED FOR CHEST PAIN FOR UP TO 30 DAYS      Nutritional Supplements (VARICOSE VEINS FORMULA OR) Take 1 tablet by mouth every morning      pantoprazole (PROTONIX) 40 MG EC tablet Take 1 tablet (40 mg) by mouth 2 times daily (before meals)      polyethylene glycol (MIRALAX) 17 GM/Dose powder 17 g      potassium chloride ER (KLOR-CON M) 20 MEQ CR tablet Take 20  "mEq by mouth every evening (TAKE IN ADDITION TO AM/NOON DOSE FOR TOTAL 30mEq DAILY)      rOPINIRole (REQUIP) 0.5 MG tablet Take 1 tablet (0.5 mg) by mouth 2 times daily      rOPINIRole (REQUIP) 1 MG tablet Take 1 mg by mouth At Bedtime (TAKE IN ADDITION TO AM/NOON DOSE FOR TOTAL 2MG DAILY)      senna-docusate (SENNA S) 8.6-50 MG tablet Take 1 tablet by mouth as needed      simvastatin (ZOCOR) 40 MG tablet [SIMVASTATIN (ZOCOR) 40 MG TABLET] Take 40 mg by mouth bedtime.      Skin Protectants, Misc. (INTERDRY 10\"X36\") SHEE Externally apply 7 Units topically once as needed (change daily in groin rash/fold) 7 each 3    spironolactone (ALDACTONE) 25 MG tablet [SPIRONOLACTONE (ALDACTONE) 25 MG TABLET] Take 25 mg by mouth daily.      triamcinolone (ARISTOCORT HP) 0.5 % external cream Apply topically daily Apply to red/irritated skin around wound daily 454 g 0    triamcinolone (KENALOG) 0.1 % external cream Apply topically 2 times daily 15 g 3    triamcinolone (KENALOG) 0.1 % external cream Apply topically 2 times daily Apply to intact skin on right leg 453.6 g 0    triamcinolone (KENALOG) 0.1 % external ointment Apply topically every 48 hours To intact skin on right leg, as well as posterior calf on right leg redness 30 g 1    vitamin B-Complex Take 1 tablet by mouth daily 60 tablet 3    vitamin D3 (CHOLECALCIFEROL) 250 mcg (96188 units) capsule Take 1 capsule (250 mcg) by mouth daily 60 capsule 3    zinc 50 MG TABS Take 1 tablet by mouth       No current facility-administered medications for this visit.         Tobacco History:  reports that she quit smoking about 48 years ago. Her smoking use included cigarettes. She has a 3 pack-year smoking history. She has never used smokeless tobacco.       REVIEW OF SYMPTOMS:   The review of systems was negative except as noted in the HPI.           PHYSICAL EXAMINATION:     BP (!) 155/77   Pulse 72   SpO2 97%            GENERAL: The patient overall appears well and is no acute " distress.   HEAD: normocephalic   EYES: Sclera and conjunctiva clear   NECK: no obvious masses   LUNGS: breathing is unlabored.   EXTREMITIES: No clubbing, cyanosis or edema   SKIN: No rashes or other abnormalities except as noted under the Wound section below.   NEUROLOGICAL: normal motor and sensory function   EDEMA: Sever  and Lymphedema       WOUND: The wound appears healthy with no sign of infection.   Wound bed: necrotic material at the left anterior/lateral leg wound only.  Periwound: healthy intact skin  The wounds that she had previously are all little bit larger today and she is got a number of new superficial open areas.    Also see below for wound details:       Circumferential volume measures:            6/10/2021    11:00 AM 6/16/2021     1:00 PM 6/22/2021     1:00 PM 6/29/2021     2:00 PM 7/30/2021     1:00 PM   Circumferential Measures   Right just above MTP 25.5 23.6 24.8 24 23.3   Right Ankle 25.5 25.4 27 26 25.2   Right Widest Calf 61 56.6 58 59.5 61   Right Thigh Up 10cm 78    78.8   Left - just above MTP 23.5 22.6 23.8 23 24   Left Ankle 28 24.2 26.5 25.5 25   Left Widest Calf 54 53.6 54.6 53.5 51.5   Left Thigh Up 10cm 71   68.5 77.2       Ulceration(s)/Wound(s):   Please see the media tab under the chart review for pictures of the wounds.  Nursing staff removed dressings and cleansed wound.    VASC Wound Rt lateral leg (Active)   Pre Size Length 10 04/04/24 1100   Pre Size Width 15 04/04/24 1100   Pre Size Depth 0.1 04/04/24 1100       VASC Wound Rt medial leg (Active)   Pre Size Length 4 04/04/24 1100   Pre Size Width 3 04/04/24 1100   Pre Size Depth 0.1 04/04/24 1100       VASC Wound Left shin (Active)   Pre Size Length 3 04/04/24 1100   Pre Size Width 4 04/04/24 1100   Pre Size Depth 0.1 04/04/24 1100   Pre Total Sq cm 12 04/04/24 1100       VASC Wound Left Lateral leg (Active)   Pre Size Length 6 04/04/24 1100   Pre Size Width 6 04/04/24 1100   Pre Size Depth 0.1 04/04/24 1100                              Recent Labs   Lab Test 04/04/22  0523 06/23/16  1130   A1C 5.4 5.9          Recent Labs   Lab Test 02/10/23  1535 05/25/22  1637 04/04/22  0523   ALBUMIN 4.3 4.1 1.9*      October 27, 2021 ankle-brachial indices: Normal  October 27, 2021 right lower extremity venous insufficiency ultrasound: Normal      Procedure note:  I had previous obtain informed consent from the patient to perform serial debridements, I confirmed this again with the patient today verbally.  Anesthetized as needed with lidocaine.  Using a curette and/or a scalpel I performed an excisional debridement removing all necrotic material at the left lateral/anterior leg wound in to the level of viable subcutaneous tissue.  I obtained hemostasis with direct pressure.  The patient tolerated the procedure well.  EBL: <5 ml  Total debridement surface area: Less than 20 cm      The other wounds did not require debridement      ASSESSMENT:   This is a 78 year old  female with lower extremity edema and right leg ulcers.           PLAN:   We will bandage the open areas with a Vashe soak followed, zinc oxide barrier cream applied to the periwound, Flagyl powder, Hydrofera Blue, a dry dressing and either Tubigrip stockings or an edema wear stocking, however I think she should change the bandages once a day due to the increased drainage recently.  I had a long discussion with the patient today about the deterioration of her wounds and the development of the new superficial open areas.  I think this is because her edema is not being well-controlled at all.  We can control the edema with compression and elevation.  She needs to do a better job of elevating her legs and start using her pneumatic lymphedema pumps more regularly.    She previously had a bilateral lower extremity venous insufficiency ultrasound performed on August 3, 2023 which did show areas of superficial venous insufficiency.  She then later met with Dr. Floyd on November  13, 2023 to discuss treatment options for her venous insufficiency but he did not recommend any procedures at that time.    Separate from the discussion of her wound care we then discussed the treatment of her lower extremity edema.  I have again explained to the patient today that controlling the edema is probably the most important thing we can do to help heal the wound.  I have specifically recommended that they lay down with their legs above the level of the heart for 30 minutes at least twice a day.  I emphasized that if we can not control the edema we will likely not be able to get this wound to heal.    I have strongly encouraged her to resume using her pneumatic lymphedema pump once a day to help control her swelling.  I have encouraged the patient to continue on their high protein diet to aid in wound healing.   The patient will return to the wound clinic in 3-4 weeks to see me again.        30 minutes spent on the date of the encounter doing chart review, history and exam, documentation and further activities per the note      Lars Torres MD  04/04/2024   12:21 PM   [facility]  [phone]    This note was electronically signed by Lars Torres MD

## 2024-04-09 ENCOUNTER — TELEPHONE (OUTPATIENT)
Dept: VASCULAR SURGERY | Facility: CLINIC | Age: 79
End: 2024-04-09
Payer: COMMERCIAL

## 2024-04-09 NOTE — TELEPHONE ENCOUNTER
Caller: Patient    Provider: MD Lars Torres    Detailed reason for call: Patient requesting orders for more Hydrofera Blue; she will be out by the end of this week.     Best phone number to contact: [phone]    Best time to contact: Any time    Ok to leave a detailed message: Yes    Ok to speak to authorized person if needed: No      (Noted to patient if reason is related to wound or incision, to please send a photo via email or [employer].)

## 2024-04-09 NOTE — LETTER
[facility]  [address] [place] [address] [address]  [place], [place] [address]  [phone]      Fax [phone]    [facility]           Fax: [phone]            Customer Service: [phone]        Account #: [id number]    Wound Dressing Rx and Order Form  Order Status: refill  Verbal: Morelia  Date: 2024     Elizabeth Kelley  Gender: female  : 1945  [address]  [facility] [address]  [phone] (home)     Medical Record: [id number]  Primary Care Provider: Francisco Ramirez    No diagnosis found.      Insurance Info:  INSURER: Payor: [employer] / Plan: [employer]CIAL / Product Type: HMO /   Policy ID#:  [id number]  SECONDARY INSURANCE:    Secondary Policy ID#:  N/A        Physician Info:   Name:  RODRIGO MATTHEWS     Dept Address/Phones:   [address] [place] [address], [address]  [facility] [id number] [phone]  Fax: [phone]    Lymphedema circumferential measurements (in cm):      6/10/2021    11:00 AM 2021     1:00 PM 2021     1:00 PM 2021     2:00 PM 2021     1:00 PM   Circumferential Measures   Right just above MTP 25.5 23.6 24.8 24 23.3   Right Ankle 25.5 25.4 27 26 25.2   Right Widest Calf 61 56.6 58 59.5 61   Right Thigh Up 10cm 78    78.8   Left - just above MTP 23.5 22.6 23.8 23 24   Left Ankle 28 24.2 26.5 25.5 25   Left Widest Calf 54 53.6 54.6 53.5 51.5   Left Thigh Up 10cm 71   68.5 77.2         Wound info:  Wound Leg Other (comment);Ulceration (Active)   Number of days: 993       Wound (used by OP I only) 03/10/22 0900 Right lower;anterior leg ulceration, venous (Active)   Number of days: 761       Wound (used by OP I only) 23 1550 Right leg (Active)   Number of days: 442       Wound (used by OP [facility]I only) 23 1101 Right medial;lower leg (Active)   Number of days: 313       VASC Wound right lower leg (Active)   Pre Size Length 17 22 1500   Pre Size Width 35 22 1500  "  Pre Size Depth 0.1 02/02/22 1500   Pre Total Sq cm 595 02/02/22 1500   Number of days: 1077       VASC Wound Rt lateral leg (Active)   Pre Size Length 10 04/04/24 1100   Pre Size Width 15 04/04/24 1100   Pre Size Depth 0.1 04/04/24 1100   Pre Total Sq cm 2.1 02/08/24 1136   Post Size Length 1.8 02/08/24 1136   Post Size Width 1.5 02/08/24 1136   Post Size Depth 0.1 02/08/24 1136   Post Total Sq cm 2.7 02/08/24 1136   Number of days: 117       VASC Wound Rt medial leg (Active)   Pre Size Length 4 04/04/24 1100   Pre Size Width 3 04/04/24 1100   Pre Size Depth 0.1 04/04/24 1100   Pre Total Sq cm 6.4 02/08/24 1136   Post Size Length 3.2 02/08/24 1136   Post Size Width 1.7 02/08/24 1136   Post Size Depth 0.1 02/08/24 1136   Post Total Sq cm 5.44 02/08/24 1136   Number of days: 117       VASC Wound Left shin (Active)   Pre Size Length 3 04/04/24 1100   Pre Size Width 4 04/04/24 1100   Pre Size Depth 0.1 04/04/24 1100   Pre Total Sq cm 12 04/04/24 1100   Number of days: 33       VASC Wound Left Lateral leg (Active)   Pre Size Length 6 04/04/24 1100   Pre Size Width 6 04/04/24 1100   Pre Size Depth 0.1 04/04/24 1100   Pre Total Sq cm 1.8 03/07/24 1100   Number of days: 33       Incision/Surgical Site 03/21/22 Right;Lower Leg (Active)   Number of days: 750        Drainage: moderate  Thickness:  full  Duration of Need: 30 DAYS  Days Supply: 30 DAYS  Start Date: 4/9/2024  Starter Kit, Ancillary Kit (saline, gloves, gauze): Yes   Qualifying wound/Debridement: Yes     NO SUBSTITUTIONS. Call [phone].      Dressing Type Brand Size Frequency of change  Quantity   Primary Hydrofera blue ready transfer  4\"x5\" EVERY OTHER DAY and as needed      NO SUBSTITUTIONS. Call [phone] with questions.       OK to forward to covered supplier.    Electronically Signed Physician:  RODRIGO MATTHEWS             Date: April 9, 2024    "

## 2024-04-16 NOTE — PLAN OF CARE
Goal Outcome Evaluation:    Shift note: 1900-0730    A&Ox4. Pt very tearful at beginning of shift. Stated she feels as though everything is catching up with her and is having a hard time with everything going on. Pt provided reassurance. POD 2 RLE excisional debridement and wound vac placement.  Brownish output via wound vac, canister changed last evening. Pain controlled with PO dilaudid and IV toradol thus far. Dilaudid was spaced out a bit more overnight d/t lethargy and difficulty waking throughout the night. Wedges beneath bilateral legs. LLE PCD on. Pt's temp was slightly elevated at 99.7. O2 was at 84-87% on room air- has been maintaining mid 90s with 2L NC. Pt was also instructed to use IS- pt demonstrated and got up to 750. Sats improved to 93% for a short period but went back down to 88% on RA. Tolerating regular diet. Amato with lower urine output- 225 for evening shift, 300 for NOC shift. Will continue plan of care.          no

## 2024-04-18 NOTE — PATIENT INSTRUCTIONS
"  Wound Care Instructions    EVERY OTHER DAY and as needed, Cleanse your right leg and left leg wound(s) with 10 minute vashe soak.    Pat Dry with non-sterile gauze, apply desitin around your wounds    Primary Dressing: Apply hydrofera blue ready transfer into/onto the wounds    Secondary dressing: Cover with ABD or dry gauze    Secure with non-sterile roll gauze (4\" x 75\" roll) and tape (1\" roll tape) as needed; avoid adhesive directly on the skin    Compression: tubular compression from foot to the knee and wear your thigh compression (edema wear) you have at home    SEEK MEDICAL CARE IF:  You have an increase in swelling, pain, or redness around the wound.  You have an increase in the amount of pus coming from the wound.  There is a bad smell coming from the wound.  The wound appears to be worsening/enlarging  You have a fever greater than 101.5 F    It is recommended that you elevate your legs throughout the day: approx 2-3 times each day  Elevate them above the level of your heart for 30 min.   Ways to do this:   Lay on the couch or your bed and prop your legs up on pillows   Recline back as far as you can go in your recliner and prop your legs on pillows.     Doing these things will help reduce the edema in your legs.      We want to hear from you!  In the next few weeks, you should receive a call or email to complete a survey about your visit at [facility]. Please help us improve your appointment experience by letting us know how we did today. We strive to make your experience good and value any ways in which we could do better.      We value your input and suggestions.    Thank you for choosing the [facility]!              "

## 2024-04-25 ENCOUNTER — OFFICE VISIT (OUTPATIENT)
Dept: VASCULAR SURGERY | Facility: CLINIC | Age: 79
End: 2024-04-25
Attending: FAMILY MEDICINE
Payer: COMMERCIAL

## 2024-04-25 VITALS
HEART RATE: 71 BPM | OXYGEN SATURATION: 97 % | RESPIRATION RATE: 16 BRPM | DIASTOLIC BLOOD PRESSURE: 81 MMHG | SYSTOLIC BLOOD PRESSURE: 145 MMHG

## 2024-04-25 DIAGNOSIS — R60.0 LOCALIZED EDEMA: Primary | ICD-10-CM

## 2024-04-25 DIAGNOSIS — L97.912 ULCER OF RIGHT LOWER EXTREMITY WITH FAT LAYER EXPOSED (H): ICD-10-CM

## 2024-04-25 PROCEDURE — 99214 OFFICE O/P EST MOD 30 MIN: CPT | Performed by: FAMILY MEDICINE

## 2024-04-25 PROCEDURE — 99215 OFFICE O/P EST HI 40 MIN: CPT | Performed by: FAMILY MEDICINE

## 2024-04-25 RX ORDER — LIDOCAINE 50 MG/G
OINTMENT TOPICAL ONCE
Status: COMPLETED | OUTPATIENT
Start: 2024-04-25 | End: 2024-04-25

## 2024-04-25 RX ADMIN — LIDOCAINE: 50 OINTMENT TOPICAL at 09:42

## 2024-04-25 ASSESSMENT — PAIN SCALES - GENERAL: PAINLEVEL: MILD PAIN (3)

## 2024-04-25 NOTE — LETTER
[facility]  [address] [place] [address] [address]  [place], [place] [address]  [phone]      Fax [phone]    [facility]           Fax: [phone]            Customer Service: [phone]        Account #: [id number]    Wound Dressing Rx and Order Form  Order Status: refill  Verbal: Morelia  Date: 2024     Elizabeth Kelley  Gender: female  : 1945  [address]  [facility] [address]  [phone] (home)     Medical Record: [id number]  Primary Care Provider: Francisco Ramirez      ICD-10-CM    1. Localized edema  R60.0 Wound care      2. Ulcer of right lower extremity with fat layer exposed (H)  L97.912 lidocaine (XYLOCAINE) 5 % ointment     Wound care            Insurance Info:  INSURER: Payor: [employer] / Plan: [employer]ERCIAL / Product Type: HMO /   Policy ID#:  [id number]  SECONDARY INSURANCE:  [employer]  Secondary Policy ID#:  [id number]        Physician Info:   Name:  RODRIGO MATTHEWS     Dept Address/Phones:   [address] [place] [address], [address]  [facility] [address] [phone]  Fax: [phone]    Lymphedema circumferential measurements (in cm):      6/10/2021    11:00 AM 2021     1:00 PM 2021     1:00 PM 2021     2:00 PM 2021     1:00 PM   Circumferential Measures   Right just above MTP 25.5 23.6 24.8 24 23.3   Right Ankle 25.5 25.4 27 26 25.2   Right Widest Calf 61 56.6 58 59.5 61   Right Thigh Up 10cm 78    78.8   Left - just above MTP 23.5 22.6 23.8 23 24   Left Ankle 28 24.2 26.5 25.5 25   Left Widest Calf 54 53.6 54.6 53.5 51.5   Left Thigh Up 10cm 71   68.5 77.2         Wound info:  Wound Leg Other (comment);Ulceration (Active)   Number of days: 1009       Wound (used by OP [facility] only) 03/10/22 0900 Right lower;anterior leg ulceration, venous (Active)   Number of days: 777       Wound (used by OP I only) 23 1550 Right leg (Active)   Number of days: 458       Wound (used by OP I only)  "06/01/23 1101 Right medial;lower leg (Active)   Number of days: 329       VASC Wound right lower leg (Active)   Pre Size Length 3.5 04/25/24 0900   Pre Size Width 6 04/25/24 0900   Pre Size Depth 0.1 04/25/24 0900   Pre Total Sq cm 595 02/02/22 1500   Number of days: 1093       VASC Wound Rt lateral leg (Active)   Pre Size Length 14 04/25/24 0900   Pre Size Width 20 04/25/24 0900   Pre Size Depth 0.1 04/25/24 0900   Pre Total Sq cm 280 04/25/24 0900   Post Size Length 1.8 02/08/24 1136   Post Size Width 1.5 02/08/24 1136   Post Size Depth 0.1 02/08/24 1136   Post Total Sq cm 2.7 02/08/24 1136   Number of days: 133       VASC Wound Rt medial leg (Active)   Pre Size Length 3.5 04/25/24 0900   Pre Size Width 5 04/25/24 0900   Pre Size Depth 0.1 04/25/24 0900   Pre Total Sq cm 6.4 02/08/24 1136   Post Size Length 3.2 02/08/24 1136   Post Size Width 1.7 02/08/24 1136   Post Size Depth 0.1 02/08/24 1136   Post Total Sq cm 5.44 02/08/24 1136   Number of days: 133       VASC Wound Left shin (Active)   Pre Size Length 3.2 04/25/24 0900   Pre Size Width 4 04/25/24 0900   Pre Size Depth 0.2 04/25/24 0900   Pre Total Sq cm 12.8 04/25/24 0900   Number of days: 49       VASC Wound Left Lateral leg (Active)   Pre Size Length 6 04/25/24 0900   Pre Size Width 5.5 04/25/24 0900   Pre Size Depth 0.1 04/25/24 0900   Pre Total Sq cm 33 04/25/24 0900   Number of days: 49       Incision/Surgical Site 03/21/22 Right;Lower Leg (Active)   Number of days: 766        Drainage: large  Thickness:  full  Duration of Need: 30 DAYS  Days Supply: 30 DAYS  Start Date: 4/25/2024  Starter Kit, Ancillary Kit (saline, gloves, gauze): Yes   Qualifying wound/Debridement: Yes     NO SUBSTITUTIONS. Call [phone].      Dressing Type Brand Size Frequency of change  Quantity   Primary Hydrofera blue ready transfer  4\"x5\" DAILY and as needed 90- NEEDS 30 PER DRESSING CHANGE     NO SUBSTITUTIONS. Call [phone] with questions.       OK to forward to " covered supplier.    Electronically Signed Physician:  RODRIGO MATTHEWS             Date: April 25, 2024

## 2024-04-25 NOTE — PROGRESS NOTES
Wound Clinic Note         Visit date: 04/25/2024       Cheif Complaint:     Elizabeth Kelley is a 78 year old   female had concerns including Wound Check.  The patient has lower extremity edema and bilateral leg ulcers         HISTORY OF PRESENT ILLNESS:    Elizabeth Kelley reports the right leg wound has been present since 2020.  The wound began due to the area being bumped.     The patient denies a history of congestive heart failure, previous vein procedures or previous DVT.   The left leg area started in March 2024 when she scrubbed her leg with a loofah pad.    She reports since her last clinic visit with me she tried a new shampoo which she had allergic reaction to which is caused hives over all of her body and increased swelling.  She has stopped using the shampoo and is taking Benadryl to help with the itching.      Since her last clinic visit with me she has been changing the bandages beginning with a Vashe soak followed Flagyl powder, Hydrofera Blue, a dry dressing and either spandagrip stocking or an edema wear stocking all changed every day or every other day.  There is been moderate to heavy serous drainage from the wounds.  She reports there is been fairly little drainage from the right leg and she is only had to change these bandages every other day.  The left leg is still had more drainage and she changes these bandages every day..      The pateint denies fevers or chills.  They report the pain from the wound has been 0/10 and has remained about the same recently.      The patient reports propping up their legs occasionally during the day, but they do not elevate their legs above the level of their heart.  She reports she does prop up her legs higher than her hips during the day.    Since her last clinic visit with me she has been able to use her pneumatic lymphedema pump on the left leg but has not been able to use on the right leg because it is bigger from the swelling related to that shampoo  reaction.    She confirms she takes Lasix once a day to help control her swelling.    Today the patient reports maintaining a high protein diet, but has not been taking protein supplements lately.        The patient denies a history of diabetes, smoking or chronic steroid use.         The patient has not had any symptoms of infection relating to the wound recently and is not currently on antibiotics.       Problem List:   Past Medical History:   Diagnosis Date    Ankle contracture, left     Class 3 severe obesity due to excess calories without serious comorbidity with body mass index (BMI) of 45.0 to 49.9 in adult (H)     Combined gastric and duodenal ulcer     Controlled substance agreement broken     Depression     Dyslipidemia     Edema     Edema     Gait abnormality     GERD (gastroesophageal reflux disease)     Gout     Lymphedema of both lower extremities     Melanoma (H)     left upper arm    MS (multiple sclerosis) (H)     RLS (restless legs syndrome)     Skin ulcer of left lower leg (H)     Valgus deformity of both feet     Vitamin D deficiency              Family Hx: family history includes Arthritis in her maternal grandmother, maternal uncle, paternal grandfather, paternal grandmother, and sister; Asthma in her sister; Brain Cancer in her father; Breast Cancer in her paternal aunt; Colon Cancer in her paternal aunt; Early Death in her maternal grandfather; Hyperlipidemia in her mother, paternal aunt, and sister; Hypertension in her mother, paternal aunt, and sister; Multiple Sclerosis in her mother and sister; Obesity in her sister; Uterine Cancer in her mother.       Surgical Hx:   Past Surgical History:   Procedure Laterality Date    ABLATION SAPHENOUS VEIN W/ RFA Right 04/12/2021    Saphenous vein, anterior accessory saphenous vein, sclerotherapy of multiple veins.    COSMETIC SURGERY  1988    reduction of excess skin from weight loss    ESOPHAGOSCOPY, GASTROSCOPY, DUODENOSCOPY (EGD), COMBINED N/A  03/30/2015    Procedure: UPPER ENDOSCOPY with gastric biopsy;  Surgeon: Checo Fletcher MD;  Location: [facility];  Service:     IRRIGATION AND DEBRIDEMENT LOWER EXTREMITY, COMBINED Right 3/21/2022    Procedure: RIGHT LEG WOUND DEBRIDEMENT, PAULIE DERMATONE, MISONIX, VAC VERA FLOW WITH VASHE;  Surgeon: Gabriele Bullock MD;  Location:  OR    IRRIGATION AND DEBRIDEMENT LOWER EXTREMITY, COMBINED Right 3/28/2022    Procedure: DEBRIDEMENT AND WOUND DRESSING CHANGE AND MYRIAD APPLICATION OF RIGHT LOWER LEG WOUND;  Surgeon: Gabriele Bullock MD;  Location: [facility] OR    MAMMOPLASTY REDUCTION Bilateral     MOHS MICROGRAPHIC PROCEDURE      left upper arm, melanoma    PICC SINGLE LUMEN PLACEMENT  8/13/2021         PICC SINGLE LUMEN PLACEMENT  9/2/2021         SPINE SURGERY      L5 area surgery, decompression          Allergies:    Allergies   Allergen Reactions    Other Environmental Allergy Anaphylaxis     Bees/Wasps Stings  Bees/Wasps Stings    Adhesive Tape Other (See Comments)     Red,itchy and oozes  Red,itchy and oozes    Adhesive [Cyanoacrylate] Unknown     Other reaction(s): sores    Oxycodone-Acetaminophen Rash    Vicodin [Hydrocodone-Acetaminophen] Nausea and Vomiting and Hives              Medication History:    Current Outpatient Medications   Medication Sig Dispense Refill    acetaminophen (TYLENOL) 325 MG tablet 1-2 tablets as needed Orally every 4 hrs      ammonium lactate (AMLACTIN) 12 % external cream Apply topically daily to dry skin on feet/legs 385 g 1    ammonium lactate (AMLACTIN) 12 % external cream Apply topically daily Apply to bilateral feet/heels daily 385 g 1    Ascorbic Acid (VITAMIN C) 500 MG CHEW 1 tablet Orally Two times daily with meals      aspirin (ASPIRIN 81) 81 MG chewable tablet Take 1 tablet by mouth      aspirin 81 mg chewable tablet Take 81 mg by mouth every evening       atorvastatin (LIPITOR) 20 MG tablet Take 20 mg by mouth daily      benzonatate (TESSALON) 100 MG capsule Take  1 capsule (100 mg) by mouth 3 times daily as needed for cough      Bioflavonoid Products (CINDY-C) 500-550 MG TABS Take 1 tablet by mouth 2 times daily 60 tablet 3    buPROPion (WELLBUTRIN SR) 150 MG 12 hr tablet Take 150 mg by mouth every morning       calcium carbonate (TUMS) 500 MG chewable tablet Take 1 tablet (500 mg) by mouth daily as needed for heartburn      Carboxymethylcellulose Sodium 0.25 % SOLN Apply 0.05 mLs to eye      citalopram (CELEXA) 40 MG tablet Take 40 mg by mouth every morning       [employer] Super B Complex/C TABS Take 1 tablet by mouth daily      cyanocobalamin (VITAMIN B-12) 1000 MCG tablet Take 1 tablet (1,000 mcg) by mouth daily 60 tablet 3    cyclobenzaprine (FLEXERIL) 10 MG tablet Take 1 tablet (10 mg) by mouth every 8 hours as needed for muscle spasms      ferrous sulfate (FEROSUL) 325 (65 Fe) MG tablet Take 1 tablet (325 mg) by mouth daily      furosemide (LASIX) 40 MG tablet Take 1 tablet (40 mg) by mouth daily      gabapentin (NEURONTIN) 100 MG capsule TAKE 1 CAPSULE BY MOUTH 3 TIMES A DAY FOR 30 DAYS      gabapentin (NEURONTIN) 300 MG capsule TAKE 1 CAPSULE BY MOUTH THREE TIMES DAILY      gabapentin (NEURONTIN) 400 MG capsule Take 1 capsule (400 mg) by mouth 3 times daily      guaiFENesin (ROBITUSSIN) 20 mg/mL SOLN solution Take 10 mLs by mouth every 4 hours as needed for cough      HYDROmorphone (DILAUDID) 2 MG tablet Take 1 tablet (2 mg) by mouth every 4 hours as needed for moderate to severe pain 12 tablet 0    lidocaine (LIDODERM) 5 % patch 1 PATCH REMOVE AFTER 12 HOURS EXTERNALLY ONCE A DAY      magnesium oxide (MAG-OX) 400 MG tablet Take 1 tablet by mouth      metroNIDAZOLE (FLAGYL) 500 MG tablet Apply 1 tablet (500 mg) topically every other day 90 tablet 0    metroNIDAZOLE (FLAGYL) 500 MG tablet CRUSH 1 TABLET AND SPRINKLE ONTO THE WOUND EVERY DAY. 90 tablet 0    multivitamin w/minerals (CENTRUM ADULTS) tablet Take 1 tablet by mouth daily       nitroGLYcerin (NITROSTAT) 0.4 MG  "sublingual tablet PLACE 1 TABLET UNDER TONGUE EVERY 5 MINTUES AS NEEDED FOR CHEST PAIN FOR UP TO 30 DAYS      Nutritional Supplements (VARICOSE VEINS FORMULA OR) Take 1 tablet by mouth every morning      pantoprazole (PROTONIX) 40 MG EC tablet Take 1 tablet (40 mg) by mouth 2 times daily (before meals)      polyethylene glycol (MIRALAX) 17 GM/Dose powder 17 g      potassium chloride ER (KLOR-CON M) 20 MEQ CR tablet Take 20 mEq by mouth every evening (TAKE IN ADDITION TO AM/NOON DOSE FOR TOTAL 30mEq DAILY)      rOPINIRole (REQUIP) 0.5 MG tablet Take 1 tablet (0.5 mg) by mouth 2 times daily      rOPINIRole (REQUIP) 1 MG tablet Take 1 mg by mouth At Bedtime (TAKE IN ADDITION TO AM/NOON DOSE FOR TOTAL 2MG DAILY)      senna-docusate (SENNA S) 8.6-50 MG tablet Take 1 tablet by mouth as needed      simvastatin (ZOCOR) 40 MG tablet [SIMVASTATIN (ZOCOR) 40 MG TABLET] Take 40 mg by mouth bedtime.      Skin Protectants, Misc. (INTERDRY 10\"X36\") SHEE Externally apply 7 Units topically once as needed (change daily in groin rash/fold) 7 each 3    spironolactone (ALDACTONE) 25 MG tablet [SPIRONOLACTONE (ALDACTONE) 25 MG TABLET] Take 25 mg by mouth daily.      triamcinolone (ARISTOCORT HP) 0.5 % external cream Apply topically daily Apply to red/irritated skin around wound daily 454 g 0    triamcinolone (KENALOG) 0.1 % external cream Apply topically 2 times daily 15 g 3    triamcinolone (KENALOG) 0.1 % external cream Apply topically 2 times daily Apply to intact skin on right leg 453.6 g 0    triamcinolone (KENALOG) 0.1 % external ointment Apply topically every 48 hours To intact skin on right leg, as well as posterior calf on right leg redness 30 g 1    vitamin B-Complex Take 1 tablet by mouth daily 60 tablet 3    vitamin D3 (CHOLECALCIFEROL) 250 mcg (04988 units) capsule Take 1 capsule (250 mcg) by mouth daily 60 capsule 3    zinc 50 MG TABS Take 1 tablet by mouth       No current facility-administered medications for this visit. "         Tobacco History:  reports that she quit smoking about 48 years ago. Her smoking use included cigarettes. She started smoking about 60 years ago. She has a 3 pack-year smoking history. She has never used smokeless tobacco.       REVIEW OF SYMPTOMS:   The review of systems was negative except as noted in the HPI.           PHYSICAL EXAMINATION:     BP (!) 145/81   Pulse 71   Resp 16   SpO2 97%            GENERAL: The patient overall appears well and is no acute distress.   HEAD: normocephalic   EYES: Sclera and conjunctiva clear   NECK: no obvious masses   LUNGS: breathing is unlabored.   EXTREMITIES: No clubbing, cyanosis or edema   SKIN: No rashes or other abnormalities except as noted under the Wound section below.   NEUROLOGICAL: normal motor and sensory function   EDEMA: Sever  and Lymphedema       WOUND: The wound appears healthy with no sign of infection.   Wound bed: granulation tissue   Periwound: healthy intact skin  Overall things are about the same today compared with her last clinic visit.    Also see below for wound details:       Circumferential volume measures:            6/10/2021    11:00 AM 6/16/2021     1:00 PM 6/22/2021     1:00 PM 6/29/2021     2:00 PM 7/30/2021     1:00 PM   Circumferential Measures   Right just above MTP 25.5 23.6 24.8 24 23.3   Right Ankle 25.5 25.4 27 26 25.2   Right Widest Calf 61 56.6 58 59.5 61   Right Thigh Up 10cm 78    78.8   Left - just above MTP 23.5 22.6 23.8 23 24   Left Ankle 28 24.2 26.5 25.5 25   Left Widest Calf 54 53.6 54.6 53.5 51.5   Left Thigh Up 10cm 71   68.5 77.2       Ulceration(s)/Wound(s):   Please see the media tab under the chart review for pictures of the wounds.  Nursing staff removed dressings and cleansed wound.    VASC Wound right lower leg (Active)   Pre Size Length 3.5 04/25/24 0900   Pre Size Width 6 04/25/24 0900   Pre Size Depth 0.1 04/25/24 0900       VASC Wound Rt lateral leg (Active)   Pre Size Length 14 04/25/24 0900   Pre  Size Width 20 04/25/24 0900   Pre Size Depth 0.1 04/25/24 0900   Pre Total Sq cm 280 04/25/24 0900       VASC Wound Rt medial leg (Active)   Pre Size Length 3.5 04/25/24 0900   Pre Size Width 5 04/25/24 0900   Pre Size Depth 0.1 04/25/24 0900       VASC Wound Left shin (Active)   Pre Size Length 3.2 04/25/24 0900   Pre Size Width 4 04/25/24 0900   Pre Size Depth 0.2 04/25/24 0900   Pre Total Sq cm 12.8 04/25/24 0900       VASC Wound Left Lateral leg (Active)   Pre Size Length 6 04/25/24 0900   Pre Size Width 5.5 04/25/24 0900   Pre Size Depth 0.1 04/25/24 0900   Pre Total Sq cm 33 04/25/24 0900                               Recent Labs   Lab Test 04/04/22  0523 06/23/16  1130   A1C 5.4 5.9          Recent Labs   Lab Test 02/10/23  1535 05/25/22  1637 04/04/22  0523   ALBUMIN 4.3 4.1 1.9*      October 27, 2021 ankle-brachial indices: Normal  October 27, 2021 right lower extremity venous insufficiency ultrasound: Normal      No sharp debridement performed today.         ASSESSMENT:   This is a 78 year old  female with lower extremity edema and right leg ulcers.           PLAN:   We will bandage the open areas with a Vashe soak followed, zinc oxide barrier cream applied to the periwound, Flagyl powder, Hydrofera Blue, a dry dressing and either Tubigrip stockings or an edema wear stocking, changed once a day or every other day depending on the drainage.  The patient and I are both hopeful that once this allergic reaction to the shampoo resolves the swelling will get under better control especially with her doing the leg elevation and using her pneumatic lymphedema pumps more regularly.    She previously had a bilateral lower extremity venous insufficiency ultrasound performed on August 3, 2023 which did show areas of superficial venous insufficiency.  She then later met with Dr. Floyd on November 13, 2023 to discuss treatment options for her venous insufficiency but he did not recommend any procedures at that  time.    Separate from the discussion of her wound care we then discussed the treatment of her lower extremity edema.  I have again explained to the patient today that controlling the edema is probably the most important thing we can do to help heal the wound.  I have specifically recommended that they lay down with their legs above the level of the heart for 30 minutes at least twice a day.  I emphasized that if we can not control the edema we will likely not be able to get this wound to heal.    I have encouraged her to continue to use the pneumatic lymphedema pump on her left leg and try to start using it on the right leg as soon as the swelling goes down as the allergic reaction resolves.  I have encouraged the patient to continue on their high protein diet to aid in wound healing.   The patient will return to the wound clinic in 3-4 weeks to see me again.        30 minutes spent on the date of the encounter doing chart review, history and exam, documentation and further activities per the note      Lars Torres MD  04/25/2024   9:52 AM   [facility]  [phone]    This note was electronically signed by Lars Torres MD

## 2024-05-17 NOTE — PATIENT INSTRUCTIONS
"  Wound Care Instructions    TWICE PER WEEK IN THE CLINIC and as needed, Cleanse your right leg and left leg wound(s) with 5 minute vashe soak.    Pat Dry with non-sterile gauze, fan fold viscopaste from ankle to below knee    Primary Dressing: Apply hydrofera blue ready transfer into/onto the wounds    Secondary dressing: Cover with ABDs    Secure with non-sterile roll gauze (4\" x 75\" roll) and tape (1\" roll tape) as needed; avoid adhesive directly on the skin    Compression: 4 layer bilaterally    SEEK MEDICAL CARE IF:  You have an increase in swelling, pain, or redness around the wound.  You have an increase in the amount of pus coming from the wound.  There is a bad smell coming from the wound.  The wound appears to be worsening/enlarging  You have a fever greater than 101.5 F    It is recommended that you elevate your legs throughout the day: approx 2-3 times each day  Elevate them above the level of your heart for 30 min.   Ways to do this:   Lay on the couch or your bed and prop your legs up on pillows   Recline back as far as you can go in your recliner and prop your legs on pillows.     Doing these things will help reduce the edema in your legs.      We want to hear from you!  In the next few weeks, you should receive a call or email to complete a survey about your visit at [facility]. Please help us improve your appointment experience by letting us know how we did today. We strive to make your experience good and value any ways in which we could do better.      We value your input and suggestions.    Thank you for choosing the [facility]!            "

## 2024-05-23 ENCOUNTER — OFFICE VISIT (OUTPATIENT)
Dept: VASCULAR SURGERY | Facility: CLINIC | Age: 79
End: 2024-05-23
Attending: FAMILY MEDICINE
Payer: COMMERCIAL

## 2024-05-23 VITALS — DIASTOLIC BLOOD PRESSURE: 80 MMHG | SYSTOLIC BLOOD PRESSURE: 152 MMHG | OXYGEN SATURATION: 98 % | HEART RATE: 75 BPM

## 2024-05-23 DIAGNOSIS — R60.0 LOCALIZED EDEMA: Primary | ICD-10-CM

## 2024-05-23 DIAGNOSIS — L97.912 ULCER OF RIGHT LOWER EXTREMITY WITH FAT LAYER EXPOSED (H): ICD-10-CM

## 2024-05-23 PROCEDURE — 29581 APPL MULTLAYER CMPRN SYS LEG: CPT

## 2024-05-23 PROCEDURE — 99215 OFFICE O/P EST HI 40 MIN: CPT | Mod: 25 | Performed by: FAMILY MEDICINE

## 2024-05-23 PROCEDURE — 99214 OFFICE O/P EST MOD 30 MIN: CPT | Performed by: FAMILY MEDICINE

## 2024-05-23 ASSESSMENT — PAIN SCALES - GENERAL: PAINLEVEL: MODERATE PAIN (5)

## 2024-05-23 NOTE — PROGRESS NOTES
Wound Clinic Note         Visit date: 05/23/2024       Cheif Complaint:     Elizabeth Kelley is a 78 year old   female had concerns including Wound Check.  The patient has lower extremity edema and bilateral leg ulcers         HISTORY OF PRESENT ILLNESS:    Elizabeth Kelley reports the right leg wound has been present since 2020.  The wound began due to the area being bumped.     The patient denies a history of congestive heart failure, previous vein procedures or previous DVT.   The left leg area started in March 2024 when she scrubbed her leg with a loofah pad.        Since her last clinic visit with me she has been changing the bandages beginning with a Vashe soak followed Flagyl powder, Hydrofera Blue, a dry dressing and a short spandagrip stocking stocking all changed every day or every other day.  There is been moderate to heavy serous drainage from the wounds.        The pateint denies fevers or chills.  They report the pain from the wound has been 0/10 and has remained about the same recently.      The patient reports propping up their legs occasionally during the day, but they do not elevate their legs above the level of their heart.  She reports she does prop up her legs higher than her hips during the day.    She reports she tried using the pneumatic lymphedema pumps again but this caused more drainage to be on her bandages and felt this was a problem so she stopped using the pneumatic lymphedema pumps.    She confirms she takes Lasix once a day to help control her swelling.    Today the patient reports maintaining a high protein diet, but has not been taking protein supplements lately.        The patient denies a history of diabetes, smoking or chronic steroid use.         The patient has not had any symptoms of infection relating to the wound recently and is not currently on antibiotics.       Problem List:   Past Medical History:   Diagnosis Date    Ankle contracture, left     Class 3 severe  obesity due to excess calories without serious comorbidity with body mass index (BMI) of 45.0 to 49.9 in adult (H)     Combined gastric and duodenal ulcer     Controlled substance agreement broken     Depression     Dyslipidemia     Edema     Edema     Gait abnormality     GERD (gastroesophageal reflux disease)     Gout     Lymphedema of both lower extremities     Melanoma (H)     left upper arm    MS (multiple sclerosis) (H)     RLS (restless legs syndrome)     Skin ulcer of left lower leg (H)     Valgus deformity of both feet     Vitamin D deficiency              Family Hx: family history includes Arthritis in her maternal grandmother, maternal uncle, paternal grandfather, paternal grandmother, and sister; Asthma in her sister; Brain Cancer in her father; Breast Cancer in her paternal aunt; Colon Cancer in her paternal aunt; Early Death in her maternal grandfather; Hyperlipidemia in her mother, paternal aunt, and sister; Hypertension in her mother, paternal aunt, and sister; Multiple Sclerosis in her mother and sister; Obesity in her sister; Uterine Cancer in her mother.       Surgical Hx:   Past Surgical History:   Procedure Laterality Date    ABLATION SAPHENOUS VEIN W/ RFA Right 04/12/2021    Saphenous vein, anterior accessory saphenous vein, sclerotherapy of multiple veins.    COSMETIC SURGERY  1988    reduction of excess skin from weight loss    ESOPHAGOSCOPY, GASTROSCOPY, DUODENOSCOPY (EGD), COMBINED N/A 03/30/2015    Procedure: UPPER ENDOSCOPY with gastric biopsy;  Surgeon: Checo Fletcher MD;  Location: [facility];  Service:     IRRIGATION AND DEBRIDEMENT LOWER EXTREMITY, COMBINED Right 3/21/2022    Procedure: RIGHT LEG WOUND DEBRIDEMENT, PAULIE DERMATONE, MISONIX, VAC VERA FLOW WITH VASHE;  Surgeon: Gabriele Bullock MD;  Location:  OR    IRRIGATION AND DEBRIDEMENT LOWER EXTREMITY, COMBINED Right 3/28/2022    Procedure: DEBRIDEMENT AND WOUND DRESSING CHANGE AND MYRIAD APPLICATION OF RIGHT LOWER  LEG WOUND;  Surgeon: Gabriele Bullock MD;  Location: [facility] OR    MAMMOPLASTY REDUCTION Bilateral     MOHS MICROGRAPHIC PROCEDURE      left upper arm, melanoma    PICC SINGLE LUMEN PLACEMENT  8/13/2021         PICC SINGLE LUMEN PLACEMENT  9/2/2021         SPINE SURGERY      L5 area surgery, decompression          Allergies:    Allergies   Allergen Reactions    Other Environmental Allergy Anaphylaxis     Bees/Wasps Stings  Bees/Wasps Stings    Adhesive Tape Other (See Comments)     Red,itchy and oozes  Red,itchy and oozes    Adhesive [Cyanoacrylate] Unknown     Other reaction(s): sores    Oxycodone-Acetaminophen Rash    Vicodin [Hydrocodone-Acetaminophen] Nausea and Vomiting and Hives              Medication History:    Current Outpatient Medications   Medication Sig Dispense Refill    acetaminophen (TYLENOL) 325 MG tablet 1-2 tablets as needed Orally every 4 hrs      ammonium lactate (AMLACTIN) 12 % external cream Apply topically daily to dry skin on feet/legs 385 g 1    ammonium lactate (AMLACTIN) 12 % external cream Apply topically daily Apply to bilateral feet/heels daily 385 g 1    Ascorbic Acid (VITAMIN C) 500 MG CHEW 1 tablet Orally Two times daily with meals      aspirin (ASPIRIN 81) 81 MG chewable tablet Take 1 tablet by mouth      aspirin 81 mg chewable tablet Take 81 mg by mouth every evening       atorvastatin (LIPITOR) 20 MG tablet Take 20 mg by mouth daily      benzonatate (TESSALON) 100 MG capsule Take 1 capsule (100 mg) by mouth 3 times daily as needed for cough      Bioflavonoid Products (CINDY-C) 500-550 MG TABS Take 1 tablet by mouth 2 times daily 60 tablet 3    buPROPion (WELLBUTRIN SR) 150 MG 12 hr tablet Take 150 mg by mouth every morning       calcium carbonate (TUMS) 500 MG chewable tablet Take 1 tablet (500 mg) by mouth daily as needed for heartburn      Carboxymethylcellulose Sodium 0.25 % SOLN Apply 0.05 mLs to eye      citalopram (CELEXA) 40 MG tablet Take 40 mg by mouth every morning        [employer] Super B Complex/C TABS Take 1 tablet by mouth daily      cyanocobalamin (VITAMIN B-12) 1000 MCG tablet Take 1 tablet (1,000 mcg) by mouth daily 60 tablet 3    cyclobenzaprine (FLEXERIL) 10 MG tablet Take 1 tablet (10 mg) by mouth every 8 hours as needed for muscle spasms      ferrous sulfate (FEROSUL) 325 (65 Fe) MG tablet Take 1 tablet (325 mg) by mouth daily      furosemide (LASIX) 40 MG tablet Take 1 tablet (40 mg) by mouth daily      gabapentin (NEURONTIN) 100 MG capsule TAKE 1 CAPSULE BY MOUTH 3 TIMES A DAY FOR 30 DAYS      gabapentin (NEURONTIN) 300 MG capsule TAKE 1 CAPSULE BY MOUTH THREE TIMES DAILY      gabapentin (NEURONTIN) 400 MG capsule Take 1 capsule (400 mg) by mouth 3 times daily      guaiFENesin (ROBITUSSIN) 20 mg/mL SOLN solution Take 10 mLs by mouth every 4 hours as needed for cough      HYDROmorphone (DILAUDID) 2 MG tablet Take 1 tablet (2 mg) by mouth every 4 hours as needed for moderate to severe pain 12 tablet 0    lidocaine (LIDODERM) 5 % patch 1 PATCH REMOVE AFTER 12 HOURS EXTERNALLY ONCE A DAY      magnesium oxide (MAG-OX) 400 MG tablet Take 1 tablet by mouth      metroNIDAZOLE (FLAGYL) 500 MG tablet Apply 1 tablet (500 mg) topically every other day 90 tablet 0    metroNIDAZOLE (FLAGYL) 500 MG tablet CRUSH 1 TABLET AND SPRINKLE ONTO THE WOUND EVERY DAY. 90 tablet 0    multivitamin w/minerals (CENTRUM ADULTS) tablet Take 1 tablet by mouth daily       nitroGLYcerin (NITROSTAT) 0.4 MG sublingual tablet PLACE 1 TABLET UNDER TONGUE EVERY 5 MINTUES AS NEEDED FOR CHEST PAIN FOR UP TO 30 DAYS      Nutritional Supplements (VARICOSE VEINS FORMULA OR) Take 1 tablet by mouth every morning      pantoprazole (PROTONIX) 40 MG EC tablet Take 1 tablet (40 mg) by mouth 2 times daily (before meals)      polyethylene glycol (MIRALAX) 17 GM/Dose powder 17 g      potassium chloride ER (KLOR-CON M) 20 MEQ CR tablet Take 20 mEq by mouth every evening (TAKE IN ADDITION TO AM/NOON DOSE FOR TOTAL 30mEq DAILY)    "   rOPINIRole (REQUIP) 0.5 MG tablet Take 1 tablet (0.5 mg) by mouth 2 times daily      rOPINIRole (REQUIP) 1 MG tablet Take 1 mg by mouth At Bedtime (TAKE IN ADDITION TO AM/NOON DOSE FOR TOTAL 2MG DAILY)      senna-docusate (SENNA S) 8.6-50 MG tablet Take 1 tablet by mouth as needed      simvastatin (ZOCOR) 40 MG tablet [SIMVASTATIN (ZOCOR) 40 MG TABLET] Take 40 mg by mouth bedtime.      Skin Protectants, Misc. (INTERDRY 10\"X36\") SHEE Externally apply 7 Units topically once as needed (change daily in groin rash/fold) 7 each 3    spironolactone (ALDACTONE) 25 MG tablet [SPIRONOLACTONE (ALDACTONE) 25 MG TABLET] Take 25 mg by mouth daily.      triamcinolone (ARISTOCORT HP) 0.5 % external cream Apply topically daily Apply to red/irritated skin around wound daily 454 g 0    triamcinolone (KENALOG) 0.1 % external cream Apply topically 2 times daily 15 g 3    triamcinolone (KENALOG) 0.1 % external cream Apply topically 2 times daily Apply to intact skin on right leg 453.6 g 0    triamcinolone (KENALOG) 0.1 % external ointment Apply topically every 48 hours To intact skin on right leg, as well as posterior calf on right leg redness 30 g 1    vitamin B-Complex Take 1 tablet by mouth daily 60 tablet 3    vitamin D3 (CHOLECALCIFEROL) 250 mcg (52190 units) capsule Take 1 capsule (250 mcg) by mouth daily 60 capsule 3    zinc 50 MG TABS Take 1 tablet by mouth       No current facility-administered medications for this visit.         Tobacco History:  reports that she quit smoking about 48 years ago. Her smoking use included cigarettes. She started smoking about 60 years ago. She has a 3 pack-year smoking history. She has never used smokeless tobacco.       REVIEW OF SYMPTOMS:   The review of systems was negative except as noted in the HPI.           PHYSICAL EXAMINATION:     BP (!) 152/80   Pulse 75   SpO2 98%            GENERAL: The patient overall appears well and is no acute distress.   HEAD: normocephalic   EYES: Sclera " and conjunctiva clear   NECK: no obvious masses   LUNGS: breathing is unlabored.   EXTREMITIES: No clubbing, cyanosis or edema   SKIN: No rashes or other abnormalities except as noted under the Wound section below.   NEUROLOGICAL: normal motor and sensory function   EDEMA: Sever  and Lymphedema       WOUND: The wound appears healthy with no sign of infection.   Wound bed: granulation tissue   Periwound: healthy intact skin  Overall things are still about the same today compared with her last clinic visit.    Also see below for wound details:       Circumferential volume measures:            6/10/2021    11:00 AM 6/16/2021     1:00 PM 6/22/2021     1:00 PM 6/29/2021     2:00 PM 7/30/2021     1:00 PM   Circumferential Measures   Right just above MTP 25.5 23.6 24.8 24 23.3   Right Ankle 25.5 25.4 27 26 25.2   Right Widest Calf 61 56.6 58 59.5 61   Right Thigh Up 10cm 78    78.8   Left - just above MTP 23.5 22.6 23.8 23 24   Left Ankle 28 24.2 26.5 25.5 25   Left Widest Calf 54 53.6 54.6 53.5 51.5   Left Thigh Up 10cm 71   68.5 77.2       Ulceration(s)/Wound(s):   Please see the media tab under the chart review for pictures of the wounds.  Nursing staff removed dressings and cleansed wound.    VASC Wound right lower leg (Active)   Pre Size Length 5 05/23/24 1500   Pre Size Width 5 05/23/24 1500   Pre Size Depth 0.1 05/23/24 1500   Pre Total Sq cm 25 05/23/24 1500       VASC Wound Rt lateral leg (Active)   Pre Size Length 31 05/23/24 1500   Pre Size Width 5 05/23/24 1500   Pre Size Depth 0.1 05/23/24 1500   Pre Total Sq cm 155 05/23/24 1500       VASC Wound Rt medial leg (Active)   Pre Size Length 3.5 05/23/24 1500   Pre Size Width 5 05/23/24 1500   Pre Size Depth 0.2 05/23/24 1500   Pre Total Sq cm 17.5 05/23/24 1500       VASC Wound Left shin (Active)   Pre Size Length 2.5 05/23/24 1500   Pre Size Width 6 05/23/24 1500   Pre Size Depth 0.1 05/23/24 1500   Pre Total Sq cm 15 05/23/24 1500       VASC Wound Left Lateral  leg (Active)   Pre Size Length 4 05/23/24 1500   Pre Size Width 8 05/23/24 1500   Pre Size Depth 0.1 05/23/24 1500   Pre Total Sq cm 32 05/23/24 1500                                 Recent Labs   Lab Test 04/04/22  0523 06/23/16  1130   A1C 5.4 5.9          Recent Labs   Lab Test 02/10/23  1535 05/25/22  1637 04/04/22  0523   ALBUMIN 4.3 4.1 1.9*      October 27, 2021 ankle-brachial indices: Normal  October 27, 2021 right lower extremity venous insufficiency ultrasound: Normal      No sharp debridement performed today.         ASSESSMENT:   This is a 78 year old  female with lower extremity edema and bilateral leg ulcers          PLAN:   Will switch to bandaging her legs with a multilayer compression wrap change once or twice a week here in the wound clinic depending on her drainage.    I explained that the pneumatic lymphedema pumps will always cause increased drainage to come out of the wounds but this is part of how they work and this is to be expected.  I encouraged her to start using the pneumatic lymphedema pumps again.    She previously had a bilateral lower extremity venous insufficiency ultrasound performed on August 3, 2023 which did show areas of superficial venous insufficiency.  She then later met with Dr. Floyd on November 13, 2023 to discuss treatment options for her venous insufficiency but he did not recommend any procedures at that time.    Separate from the discussion of her wound care we then discussed the treatment of her lower extremity edema.  I have again explained to the patient today that controlling the edema is probably the most important thing we can do to help heal the wound.  I have specifically recommended that they lay down with their legs above the level of the heart for 30 minutes at least twice a day.  I emphasized that if we can not control the edema we will likely not be able to get this wound to heal.    I have encouraged her to use the pneumatic lymphedema pump once a day.  I  have encouraged the patient to continue on their high protein diet to aid in wound healing.   The patient will return to the wound clinic once or twice a week to have the multilayer wrap changed and then she will see me again in 3-4 weeks.        30 minutes spent on the date of the encounter doing chart review, history and exam, documentation and further activities per the note      Lars Torres MD  05/23/2024   3:45 PM   [facility]  [phone]    This note was electronically signed by Lars Torres MD

## 2024-05-25 RX ORDER — METRONIDAZOLE 500 MG/1
TABLET ORAL
Qty: 90 TABLET | Refills: 0 | OUTPATIENT
Start: 2024-05-25

## 2024-05-28 ENCOUNTER — OFFICE VISIT (OUTPATIENT)
Dept: VASCULAR SURGERY | Facility: CLINIC | Age: 79
End: 2024-05-28
Attending: FAMILY MEDICINE
Payer: COMMERCIAL

## 2024-05-28 VITALS
HEART RATE: 62 BPM | DIASTOLIC BLOOD PRESSURE: 83 MMHG | TEMPERATURE: 97.9 F | RESPIRATION RATE: 18 BRPM | OXYGEN SATURATION: 98 % | SYSTOLIC BLOOD PRESSURE: 140 MMHG

## 2024-05-28 DIAGNOSIS — L97.912 ULCER OF RIGHT LOWER EXTREMITY WITH FAT LAYER EXPOSED (H): Primary | ICD-10-CM

## 2024-05-28 DIAGNOSIS — R60.0 LOCALIZED EDEMA: ICD-10-CM

## 2024-05-28 PROCEDURE — 97602 WOUND(S) CARE NON-SELECTIVE: CPT

## 2024-05-28 ASSESSMENT — PAIN SCALES - GENERAL: PAINLEVEL: MODERATE PAIN (4)

## 2024-05-28 NOTE — PROGRESS NOTES
[facility]  -  Nurse Visit                          Date of Service:  May 28, 2024     Requesting Provider: MD Lars Torres    Diagnosis:     ICD-10-CM    1. Ulcer of right lower extremity with fat layer exposed (H)  L97.912       2. Localized edema  R60.0           Chief Complaint: Elizabeth is being seen today at [facility] [place] for her wound(s) and swelling dressing change. Reports pain of 4.  Denies any fevers, chills, or generalized ill feeling.     Dressing on Arrival: hydrofera, viscopaste, abd, rolled gauze, Pt is currently using 4 layer for compression and did tolerate well up until 5/27. She had her  cut off the foot of the 4 layer compression on the right side due to pain/slouching of wrap and toes turning blue. Educated to remove entire wrap if that happens again. Upon removal of dressings heavy Serous drainage is noted on RLE. Moderate serosang drainage from LLE.    New Wounds noted: No    Vital Signs: BP (!) 140/83   Pulse 62   Temp 97.9  F (36.6  C)   Resp 18   SpO2 98%     Assessment:      General:  Patient presents to clinic in no apparent distress.   Psychiatric:  Alert and oriented x3.   Lower extremity:  edema is present.    Integumentary:  Skin is dry and flaky    Circumferential volume measures:      6/16/2021     1:00 PM 6/22/2021     1:00 PM 6/29/2021     2:00 PM 7/30/2021     1:00 PM 5/28/2024     2:00 PM   Circumferential Measures   Right just above MTP 23.6 24.8 24 23.3 24   Right Ankle 25.4 27 26 25.2 24.5   Right Widest Calf 56.6 58 59.5 61 55   Right Thigh Up 10cm    78.8    Left - just above MTP 22.6 23.8 23 24 23.5   Left Ankle 24.2 26.5 25.5 25 24   Left Widest Calf 53.6 54.6 53.5 51.5 56   Left Thigh Up 10cm   68.5 77.2        Wound info:  Wound Leg Other (comment);Ulceration (Active)       Wound (used by OP I only) 03/10/22 0900 Right lower;anterior leg ulceration, venous (Active)       Wound (used by OP [facility]I only) 01/23/23  1550 Right leg (Active)       Wound (used by OP [facility] only) 06/01/23 1101 Right medial;lower leg (Active)       VASC Wound right lower leg (Active)   Pre Size Length 4 05/28/24 1400   Pre Size Width 7 05/28/24 1400   Pre Size Depth 0.1 05/28/24 1400   Pre Total Sq cm 28 05/28/24 1400       VASC Wound Rt lateral leg (Active)   Pre Size Length 5 05/28/24 1400   Pre Size Width 20 05/28/24 1400   Pre Size Depth 0.1 05/28/24 1400   Pre Total Sq cm 100 05/28/24 1400   Post Size Length 1.8 02/08/24 1136   Post Size Width 1.5 02/08/24 1136   Post Size Depth 0.1 02/08/24 1136   Post Total Sq cm 2.7 02/08/24 1136       VASC Wound Rt medial leg (Active)   Pre Size Length 3.5 05/28/24 1400   Pre Size Width 4.5 05/28/24 1400   Pre Size Depth 0.2 05/28/24 1400   Pre Total Sq cm 15.75 05/28/24 1400   Post Size Length 3.2 02/08/24 1136   Post Size Width 1.7 02/08/24 1136   Post Size Depth 0.1 02/08/24 1136   Post Total Sq cm 5.44 02/08/24 1136       VASC Wound Left shin (Active)   Pre Size Length 2.7 05/28/24 1400   Pre Size Width 4.7 05/28/24 1400   Pre Size Depth 0.1 05/28/24 1400   Pre Total Sq cm 12.69 05/28/24 1400       VASC Wound Left Lateral leg (Active)   Pre Size Length 4 05/23/24 1500   Pre Size Width 8 05/23/24 1500   Pre Size Depth 0.1 05/23/24 1500   Pre Total Sq cm 32 05/23/24 1500   Description dry intact epithelial 05/28/24 1400       Incision/Surgical Site 03/21/22 Right;Lower Leg (Active)       Undermining is not present.    The periwoundskin is  dry and flaky      Plan:         1. Patient will return in 2 days for nurse visit.            2. As listed below, treatment provided irrigation, mechanical cleansing, and dressings to promote autolytic debridement and included application of multi-layer compression therapy.             Cleansed with: soap and water and vashe    Protected skin with: Remedy skin repair lotion    Dressings Applied to wound: Hydrofera blue ready transfer, ABD, and Secured with roll gauze and  tape. Viscopaste to RLE only    Compression Applied to the right le-Layer Compression  Compression Applied to the left le-Layer Compression    4-Layer: LAYER 1: ORTHOPAEDIC WOOL The bandage was applied without tension in aloose spiral from base of toes to knee joint with 50% overlap.LAYER 2: LIGHT SUPPORT BANDAGE Then the next layer bandage was applied in spiral toe to knee with 50% overlap with 50% overlap.LAYER 3: LIGHT COMPRESSION BANDAGE Then the elastic conformable. compression bandage was applied at mid stretch (50%) in a figure of eight from toe to knee, with a 50% overlap.  LAYER 4: COHESIVE EXTENSIBLE BANDAGE Finally the lightweight, elastic, cohesive bandage was applied at mid stretch (50%) in a spiral with a 50 % overlap from toe to knee.    Offloading used: None    Trial Products: No    Provider notified regarding concerns: No    Treatment Changes: Yes: viscopaste applied to RLE only    Tolerated Dressing Change:  Yes    Taught Regarding: follow up appointment(s), wound cares, and compression    Educational Barriers: No barriers      TALA MARTINEZ RN CWOCN, CFCN

## 2024-05-28 NOTE — PATIENT INSTRUCTIONS
"  Wound Care Instructions    TWICE PER WEEK IN THE CLINIC and as needed, Cleanse your right leg and left leg wound(s) with 5 minute vashe soak.    Pat Dry with non-sterile gauze, fan fold viscopaste from ankle to below knee    Primary Dressing: Apply hydrofera blue ready transfer into/onto the wounds    Secondary dressing: Cover with ABDs    Secure with non-sterile roll gauze (4\" x 75\" roll) and tape (1\" roll tape) as needed; avoid adhesive directly on the skin    Compression: 4 layer bilaterally    SEEK MEDICAL CARE IF:  You have an increase in swelling, pain, or redness around the wound.  You have an increase in the amount of pus coming from the wound.  There is a bad smell coming from the wound.  The wound appears to be worsening/enlarging  You have a fever greater than 101.5 F    It is recommended that you elevate your legs throughout the day: approx 2-3 times each day  Elevate them above the level of your heart for 30 min.   Ways to do this:   Lay on the couch or your bed and prop your legs up on pillows   Recline back as far as you can go in your recliner and prop your legs on pillows.     Doing these things will help reduce the edema in your legs.      We want to hear from you!  In the next few weeks, you should receive a call or email to complete a survey about your visit at [facility]. Please help us improve your appointment experience by letting us know how we did today. We strive to make your experience good and value any ways in which we could do better.      We value your input and suggestions.    Thank you for choosing the [facility]!          "

## 2024-05-30 ENCOUNTER — OFFICE VISIT (OUTPATIENT)
Dept: VASCULAR SURGERY | Facility: CLINIC | Age: 79
End: 2024-05-30
Attending: FAMILY MEDICINE
Payer: COMMERCIAL

## 2024-05-30 VITALS
BODY MASS INDEX: 48.98 KG/M2 | WEIGHT: 255 LBS | HEART RATE: 72 BPM | OXYGEN SATURATION: 98 % | RESPIRATION RATE: 16 BRPM | SYSTOLIC BLOOD PRESSURE: 138 MMHG | TEMPERATURE: 98 F | DIASTOLIC BLOOD PRESSURE: 79 MMHG

## 2024-05-30 DIAGNOSIS — L97.912 ULCER OF RIGHT LOWER EXTREMITY WITH FAT LAYER EXPOSED (H): Primary | ICD-10-CM

## 2024-05-30 DIAGNOSIS — R60.0 LOCALIZED EDEMA: ICD-10-CM

## 2024-05-30 PROCEDURE — 97602 WOUND(S) CARE NON-SELECTIVE: CPT

## 2024-05-30 ASSESSMENT — PAIN SCALES - GENERAL: PAINLEVEL: NO PAIN (0)

## 2024-05-30 NOTE — PROGRESS NOTES
[facility]  -  Nurse Visit      BLE      R medial leg at bend of knee      R shin      Right medial leg      Left shin    Date of Service:  May 30, 2024     Requesting Provider: MD Lars Torres    Diagnosis:     ICD-10-CM    1. Ulcer of right lower extremity with fat layer exposed (H)  L97.912       2. Localized edema  R60.0           Chief Complaint: Elizabeth is being seen today at [facility] [place] for her BLE wound(s) and swelling dressing change. Reports pain of 0 out of 10.  Denies any fevers, chills, or generalized ill feeling.     Dressing on Arrival: 4 layer BLE, hydrofera blue ready, viscopast on RLE. Pt is currently using 4 layer BLE for compression and did tolerate well. Upon removal of dressings moderate serosanguinous drainage is noted.    New Wounds noted: No    Vital Signs: /79   Pulse 72   Temp 98  F (36.7  C) (Oral)   Resp 16   Wt 255 lb (115.7 kg)   SpO2 98%   BMI 48.98 kg/m      Assessment:      General:  Patient presents to clinic in no apparent distress.   Psychiatric:  Alert and oriented x3.   Lower extremity:  edema is present.    Integumentary:  Skin is WNL    Circumferential volume measures:      6/16/2021     1:00 PM 6/22/2021     1:00 PM 6/29/2021     2:00 PM 7/30/2021     1:00 PM 5/28/2024     2:00 PM   Circumferential Measures   Right just above MTP 23.6 24.8 24 23.3 24   Right Ankle 25.4 27 26 25.2 24.5   Right Widest Calf 56.6 58 59.5 61 55   Right Thigh Up 10cm    78.8    Left - just above MTP 22.6 23.8 23 24 23.5   Left Ankle 24.2 26.5 25.5 25 24   Left Widest Calf 53.6 54.6 53.5 51.5 56   Left Thigh Up 10cm   68.5 77.2        Wound info:  Wound Leg Other (comment);Ulceration (Active)       Wound (used by OP [facility] only) 03/10/22 0900 Right lower;anterior leg ulceration, venous (Active)       Wound (used by [facility] only) 01/23/23 1550 Right leg (Active)       Wound (used by [facility] only) 06/01/23 1101 Right medial;lower leg (Active)        VASC Wound right lower leg (Active)   Pre Size Length 4 24 1100   Pre Size Width 7 24 1100   Pre Size Depth 0.1 24 1100   Pre Total Sq cm 28 24 1100       VASC Wound Rt lateral leg (Active)   Pre Size Length 13 24 1100   Pre Size Width 17 24 1100   Pre Size Depth 0.1 24 1100   Pre Total Sq cm 100 24 1400   Post Size Length 1.8 24 1136   Post Size Width 1.5 24 1136   Post Size Depth 0.1 24 1136   Post Total Sq cm 2.7 24 1136   Description weeping 24 1100       VASC Wound Rt medial leg (Active)   Pre Size Length 4 24 1100   Pre Size Width 2.5 24 1100   Pre Size Depth 0.1 24 1100   Pre Total Sq cm 10 24 1100   Post Size Length 3.2 24 1136   Post Size Width 1.7 24 1136   Post Size Depth 0.1 24 1136   Post Total Sq cm 5.44 24 1136       VASC Wound Left shin (Active)   Pre Size Length 2.5 24 1100   Pre Size Width 4 24 1100   Pre Size Depth 0.1 24 1100   Pre Total Sq cm 10 24 1100       VASC Wound Left Lateral leg (Active)   Pre Size Length 0 24 1100   Pre Size Width 0 24 1100   Pre Size Depth 0 24 1100   Pre Total Sq cm 0 24 1100   Description dry intact epithelial 24 1400       Incision/Surgical Site 22 Right;Lower Leg (Active)       Undermining is not present.    The periwoundskin is  pink and red      Plan:         1. Patient will return on 6/3/24 for nurse visit           2. As listed below, treatment provided irrigation, mechanical cleansing, and dressings to promote autolytic debridement and included application of multi-layer compression therapy.             Cleansed with: Vashe soak    Protected skin with: Remedy skin repair lotion LLE    Dressings Applied to wound:  Hydrofera blue ready to wounds, viscopaste to RLE fan folded, ABD pads    Compression Applied to the right le-Layer Compression  Compression Applied to the  left le-Layer Compression    4-Layer: LAYER 1: ORTHOPAEDIC WOOL The bandage was applied without tension in aloose spiral from base of toes to knee joint with 50% overlap.LAYER 2: LIGHT SUPPORT BANDAGE Then the next layer bandage was applied in spiral toe to knee with 50% overlap with 50% overlap.LAYER 3: LIGHT COMPRESSION BANDAGE Then the elastic conformable. compression bandage was applied at mid stretch (50%) in a figure of eight from toe to knee, with a 50% overlap.  LAYER 4: COHESIVE EXTENSIBLE BANDAGE Finally the lightweight, elastic, cohesive bandage was applied at mid stretch (50%) in a spiral with a 50 % overlap from toe to knee.    Offloading used:  cane    Trial Products: No    Provider notified regarding concerns: No    Treatment Changes: No    Tolerated Dressing Change:  Yes    Taught Regarding: follow up appointment(s), wound cares, layered compression wraps - maximum wear time of 7 days, elevation, offloading, compliance, and signs of infection    Educational Barriers: No barriers      AUGUSTINA BECK RN

## 2024-05-30 NOTE — PATIENT INSTRUCTIONS
"Wound Care Instructions     TWICE PER WEEK IN THE CLINIC and as needed, Cleanse your right leg and left leg wound(s) with 5 minute vashe soak.     Pat Dry with non-sterile gauze, fan fold viscopaste from ankle to below knee     Primary Dressing: Apply hydrofera blue ready transfer into/onto the wounds     Secondary dressing: Cover with ABDs     Secure with non-sterile roll gauze (4\" x 75\" roll) and tape (1\" roll tape) as needed; avoid adhesive directly on the skin     Compression: 4 layer bilaterally     SEEK MEDICAL CARE IF:  You have an increase in swelling, pain, or redness around the wound.  You have an increase in the amount of pus coming from the wound.  There is a bad smell coming from the wound.  The wound appears to be worsening/enlarging  You have a fever greater than 101.5 F     It is recommended that you elevate your legs throughout the day: approx 2-3 times each day  Elevate them above the level of your heart for 30 min.   Ways to do this:   Lay on the couch or your bed and prop your legs up on pillows   Recline back as far as you can go in your recliner and prop your legs on pillows.      Doing these things will help reduce the edema in your legs.        We want to hear from you!  In the next few weeks, you should receive a call or email to complete a survey about your visit at [facility]. Please help us improve your appointment experience by letting us know how we did today. We strive to make your experience good and value any ways in which we could do better.       We value your input and suggestions.     Thank you for choosing the [facility]!     "

## 2024-06-03 ENCOUNTER — OFFICE VISIT (OUTPATIENT)
Dept: VASCULAR SURGERY | Facility: CLINIC | Age: 79
End: 2024-06-03
Attending: FAMILY MEDICINE
Payer: COMMERCIAL

## 2024-06-03 VITALS
TEMPERATURE: 98 F | HEART RATE: 73 BPM | DIASTOLIC BLOOD PRESSURE: 73 MMHG | RESPIRATION RATE: 12 BRPM | SYSTOLIC BLOOD PRESSURE: 141 MMHG

## 2024-06-03 DIAGNOSIS — R60.0 LOCALIZED EDEMA: ICD-10-CM

## 2024-06-03 DIAGNOSIS — L97.912 ULCER OF RIGHT LOWER EXTREMITY WITH FAT LAYER EXPOSED (H): Primary | ICD-10-CM

## 2024-06-03 PROCEDURE — 97602 WOUND(S) CARE NON-SELECTIVE: CPT

## 2024-06-03 ASSESSMENT — PAIN SCALES - GENERAL: PAINLEVEL: MODERATE PAIN (4)

## 2024-06-03 NOTE — PATIENT INSTRUCTIONS
"Wound Care Instructions     TWICE PER WEEK IN THE CLINIC and as needed, Cleanse your right leg and left leg wound(s) with 5 minute vashe soak.     Pat Dry with non-sterile gauze, fan fold viscopaste from ankle to below knee     Primary Dressing: Apply hydrofera blue ready transfer into/onto the wounds     Secondary dressing: Cover with ABDs     Secure with non-sterile roll gauze (4\" x 75\" roll) and tape (1\" roll tape) as needed; avoid adhesive directly on the skin     Compression: 4 layer bilaterally     SEEK MEDICAL CARE IF:  You have an increase in swelling, pain, or redness around the wound.  You have an increase in the amount of pus coming from the wound.  There is a bad smell coming from the wound.  The wound appears to be worsening/enlarging  You have a fever greater than 101.5 F     It is recommended that you elevate your legs throughout the day: approx 2-3 times each day  Elevate them above the level of your heart for 30 min.   Ways to do this:   Lay on the couch or your bed and prop your legs up on pillows   Recline back as far as you can go in your recliner and prop your legs on pillows.      Doing these things will help reduce the edema in your legs.        We want to hear from you!  In the next few weeks, you should receive a call or email to complete a survey about your visit at [facility]. Please help us improve your appointment experience by letting us know how we did today. We strive to make your experience good and value any ways in which we could do better.       We value your input and suggestions.     Thank you for choosing the [facility]!     "

## 2024-06-03 NOTE — PROGRESS NOTES
[facility]  -  Nurse Visit        Date of Service:  Karina 3, 2024     Requesting Provider: MD Lars Torres    Diagnosis:     ICD-10-CM    1. Ulcer of right lower extremity with fat layer exposed (H)  L97.912       2. Localized edema  R60.0           Chief Complaint: Elizabeth is being seen today at [facility] [place] for her wound(s) and swelling dressing change. Reports pain of 4/10.  Denies any fevers, chills, or generalized ill feeling. Ambulates in clinic with a cane.    Dressing on Arrival: removed left 4 layer yesterday per patient hurt on ankle,hydrofera,abd, right hydrofera blue,viscopaste,abd on left had slipped down., Pt is currently using bilateral 4 layers for compression and did not tolerate well. Upon removal of dressings moderate Serosanguinous drainage is noted.    New Wounds noted: No    Vital Signs: BP (!) 141/73   Pulse 73   Temp 98  F (36.7  C)   Resp 12     Assessment:      General:  Patient presents to clinic in no apparent distress.   Psychiatric:  Alert and oriented x3.   Lower extremity:  edema is present.    Integumentary:  Skin is red/pink/ flaking.    Circumferential volume measures:      6/22/2021     1:00 PM 6/29/2021     2:00 PM 7/30/2021     1:00 PM 5/28/2024     2:00 PM 6/3/2024     2:00 PM   Circumferential Measures   Right just above MTP 24.8 24 23.3 24 23.5   Right Ankle 27 26 25.2 24.5 27   Right Widest Calf 58 59.5 61 55 53.5   Right Thigh Up 10cm   78.8     Left - just above MTP 23.8 23 24 23.5 23.5   Left Ankle 26.5 25.5 25 24 24   Left Widest Calf 54.6 53.5 51.5 56 53   Left Thigh Up 10cm  68.5 77.2         Wound info:  Wound Leg Other (comment);Ulceration (Active)       Wound (used by OP [facility] only) 03/10/22 0900 Right lower;anterior leg ulceration, venous (Active)       Wound (used by [facility] only) 01/23/23 1550 Right leg (Active)       Wound (used by [facility] only) 06/01/23 1101 Right medial;lower leg (Active)        VASC Wound right lower leg (Active)   Pre Size Length 4 24 1400   Pre Size Width 7 24 1400   Pre Size Depth 0.1 24 1400   Pre Total Sq cm 28 24 1400       VASC Wound Rt lateral leg (Active)   Pre Size Length 13 24 1400   Pre Size Width 17 24 1400   Pre Size Depth 0.1 24 1400   Pre Total Sq cm 100 24 1400   Post Size Length 1.8 24 1136   Post Size Width 1.5 24 1136   Post Size Depth 0.1 24 1136   Post Total Sq cm 2.7 24 1136   Description weeping 24 1400       VASC Wound Rt medial leg (Active)   Pre Size Length 4 24 1400   Pre Size Width 2.5 24 1400   Pre Size Depth 0.1 24 1400   Pre Total Sq cm 10 24 1400   Post Size Length 3.2 24 1136   Post Size Width 1.7 24 1136   Post Size Depth 0.1 24 1136   Post Total Sq cm 5.44 24 1136       VASC Wound Left shin (Active)   Pre Size Length 2.8 24 1400   Pre Size Width 6 24 1400   Pre Size Depth 0.1 24 1400   Pre Total Sq cm 16.8 24 1400   Product Used ABD Pad 24 1400       VASC Wound Left Lateral leg (Active)   Pre Size Length 0 24 1400   Pre Size Width 0 24 1400   Pre Size Depth 0 24 1100   Pre Total Sq cm 0 24 1100   Description dry intact epithelial 24 1400       Incision/Surgical Site 22 Right;Lower Leg (Active)       Undermining is not present.    The periwoundskin is erythema and no warmth      Plan:         1. Patient will return in 3 days for nurse visit.            2. As listed below, treatment provided irrigation, mechanical cleansing, and dressings to promote autolytic debridement.             Cleansed with: soap and water and vasche soak    Protected skin with:  viscopaste    Dressings Applied to wound: Hydrofera blue ready transfer, Viscopaste, ABD, Secured with roll gauze and tape. , and no viscopaste on left leg    Compression Applied to the right le-Layer  Compression  Compression Applied to the left le-Layer Compression    4-Layer: LAYER 1: ORTHOPAEDIC WOOL The bandage was applied without tension in aloose spiral from base of toes to knee joint with 50% overlap.LAYER 2: LIGHT SUPPORT BANDAGE Then the next layer bandage was applied in spiral toe to knee with 50% overlap with 50% overlap.LAYER 3: LIGHT COMPRESSION BANDAGE Then the elastic conformable. compression bandage was applied at mid stretch (50%) in a figure of eight from toe to knee, with a 50% overlap.  LAYER 4: COHESIVE EXTENSIBLE BANDAGE Finally the lightweight, elastic, cohesive bandage was applied at mid stretch (50%) in a spiral with a 50 % overlap from toe to knee.    Offloading used: None    Trial Products: No    Provider notified regarding concerns: No    Treatment Changes: No    Tolerated Dressing Change:  Yes    Taught Regarding: follow up appointment(s), wound cares, wound care supplies, compression, layered compression wraps - maximum wear time of 7 days, elevation, and compliance    Educational Barriers: No barriers      Umair Baca RN

## 2024-06-06 ENCOUNTER — OFFICE VISIT (OUTPATIENT)
Dept: VASCULAR SURGERY | Facility: CLINIC | Age: 79
End: 2024-06-06
Attending: FAMILY MEDICINE
Payer: COMMERCIAL

## 2024-06-06 VITALS
OXYGEN SATURATION: 99 % | DIASTOLIC BLOOD PRESSURE: 81 MMHG | RESPIRATION RATE: 12 BRPM | BODY MASS INDEX: 48.79 KG/M2 | HEART RATE: 64 BPM | WEIGHT: 254 LBS | TEMPERATURE: 97.6 F | SYSTOLIC BLOOD PRESSURE: 155 MMHG

## 2024-06-06 DIAGNOSIS — L97.912 ULCER OF RIGHT LOWER EXTREMITY WITH FAT LAYER EXPOSED (H): Primary | ICD-10-CM

## 2024-06-06 DIAGNOSIS — R60.0 LOCALIZED EDEMA: ICD-10-CM

## 2024-06-06 DIAGNOSIS — I83.019 VARICOSE VEINS WITH ULCER, RIGHT (H): ICD-10-CM

## 2024-06-06 DIAGNOSIS — L97.919 VARICOSE VEINS WITH ULCER, RIGHT (H): ICD-10-CM

## 2024-06-06 PROCEDURE — 97602 WOUND(S) CARE NON-SELECTIVE: CPT

## 2024-06-06 ASSESSMENT — PAIN SCALES - GENERAL: PAINLEVEL: NO PAIN (0)

## 2024-06-06 NOTE — PATIENT INSTRUCTIONS
"Wound Care Instructions    TWICE PER WEEK IN THE CLINIC and as needed, Cleanse your right leg and left leg wound(s) with 5 minute vashe soak.    Pat Dry with non-sterile gauze, fan fold viscopaste from ankle to below knee    Primary Dressing: Apply hydrofera blue ready transfer into/onto the wounds    Secondary dressing: Cover with ABDs    Secure with non-sterile roll gauze (4\" x 75\" roll) and tape (1\" roll tape) as needed; avoid adhesive directly on the skin    Compression: 4 layer bilaterally    SEEK MEDICAL CARE IF:  You have an increase in swelling, pain, or redness around the wound.  You have an increase in the amount of pus coming from the wound.  There is a bad smell coming from the wound.  The wound appears to be worsening/enlarging  You have a fever greater than 101.5 F    It is recommended that you elevate your legs throughout the day: approx 2-3 times each day  Elevate them above the level of your heart for 30 min.  Ways to do this:  Lay on the couch or your bed and prop your legs up on pillows  Recline back as far as you can go in your recliner and prop your legs on pillows.    Doing these things will help reduce the edema in your legs.      We want to hear from you!  In the next few weeks, you should receive a call or email to complete a survey about your visit at [facility]. Please help us improve your appointment experience by letting us know how we did today. We strive to make your experience good and value any ways in which we could do better.      We value your input and suggestions.    Thank you for choosing the [facility]!   "

## 2024-06-06 NOTE — PATIENT INSTRUCTIONS
"  Wound Care Instructions    TWICE PER WEEK IN THE CLINIC and as needed, Cleanse your right leg and left leg wound(s) with 5 minute vashe soak.    Pat Dry with non-sterile gauze    Primary Dressing: Apply hydrofera blue ready transfer into/onto the wounds    Secondary dressing: Cover with ABDs    Secure with non-sterile roll gauze (4\" x 75\" roll) and tape (1\" roll tape) as needed; avoid adhesive directly on the skin    Compression: 4 layer bilaterally    SEEK MEDICAL CARE IF:  You have an increase in swelling, pain, or redness around the wound.  You have an increase in the amount of pus coming from the wound.  There is a bad smell coming from the wound.  The wound appears to be worsening/enlarging  You have a fever greater than 101.5 F    It is recommended that you elevate your legs throughout the day: approx 2-3 times each day  Elevate them above the level of your heart for 30 min.   Ways to do this:   Lay on the couch or your bed and prop your legs up on pillows   Recline back as far as you can go in your recliner and prop your legs on pillows.     Doing these things will help reduce the edema in your legs.      We want to hear from you!  In the next few weeks, you should receive a call or email to complete a survey about your visit at [facility]. Please help us improve your appointment experience by letting us know how we did today. We strive to make your experience good and value any ways in which we could do better.      We value your input and suggestions.    Thank you for choosing the [facility]!        "

## 2024-06-06 NOTE — PROGRESS NOTES
Wound Care Instructions  [facility]  -  Nurse Visit      Left shin      Left lateral leg      LLE      RLE      R medial leg      R shin    Date of Service:  June 6, 2024     Requesting Provider: MD Lars Torers    Diagnosis:     ICD-10-CM    1. Ulcer of right lower extremity with fat layer exposed (H)  L97.912       2. Localized edema  R60.0       3. Varicose veins with ulcer, right (H)  I83.019     L97.919           Chief Complaint: Elizabeth is being seen today at [facility] [place] for her wound(s) and swelling dressing change. Reports pain of 0 out of 10.  Denies any fevers, chills, or generalized ill feeling.     Dressing on Arrival: 4 layer bilaterally, fallen about 4 inches on right and 2 inches on left. Pt is currently using bilateral 4 layer for compression and did tolerate well. Upon removal of dressings moderate serosanguinous drainage is noted.    New Wounds noted: No    Vital Signs: BP (!) 155/81   Pulse 64   Temp 97.6  F (36.4  C) (Oral)   Resp 12   Wt 254 lb (115.2 kg)   SpO2 99%   BMI 48.79 kg/m      Assessment:      General:  Patient presents to clinic in no apparent distress.   Psychiatric:  Alert and oriented x3.   Lower extremity:  edema is present.    Integumentary:  Skin is weepy and scaly RLE    Circumferential volume measures:      6/29/2021     2:00 PM 7/30/2021     1:00 PM 5/28/2024     2:00 PM 6/3/2024     2:00 PM 6/6/2024    11:00 AM   Circumferential Measures   Right just above MTP 24 23.3 24 23.5 23   Right Ankle 26 25.2 24.5 27 24   Right Widest Calf 59.5 61 55 53.5 57   Right Thigh Up 10cm  78.8      Left - just above MTP 23 24 23.5 23.5 23   Left Ankle 25.5 25 24 24 22   Left Widest Calf 53.5 51.5 56 53 54   Left Thigh Up 10cm 68.5 77.2          Wound info:  Wound Leg Other (comment);Ulceration (Active)       Wound (used by OP [facility] only) 03/10/22 0900 Right lower;anterior leg ulceration, venous (Active)       Wound (used by OP [facility] only)  23 1550 Right leg (Active)       Wound (used by OP [facility] only) 23 1101 Right medial;lower leg (Active)       VASC Wound right lower leg (Active)   Pre Size Length 0.8 24 1100   Pre Size Width 0.8 24 1100   Pre Size Depth 0.1 24 1100   Pre Total Sq cm 0.64 24 1100       VASC Wound Rt lateral leg (Active)   Pre Size Length 13 24 1100   Pre Size Width 17 24 1100   Pre Size Depth 0.1 24 1100   Pre Total Sq cm 100 24 1400   Post Size Length 1.8 24 1136   Post Size Width 1.5 24 1136   Post Size Depth 0.1 24 1136   Post Total Sq cm 2.7 24 1136   Description weeping 24 1100       VASC Wound Rt medial leg (Active)   Pre Size Length 3.5 24 1100   Pre Size Width 2.5 24 1100   Pre Size Depth 0.1 24 1100   Pre Total Sq cm 10 24 1400   Post Size Length 3.2 24 1136   Post Size Width 1.7 24 1136   Post Size Depth 0.1 24 1136   Post Total Sq cm 5.44 24 1136       VASC Wound Left shin (Active)   Pre Size Length 3.5 24 1100   Pre Size Width 5 24 1100   Pre Size Depth 0.1 24 1100   Pre Total Sq cm 16.8 24 1400   Product Used ABD Pad 24 1400       Incision/Surgical Site 22 Right;Lower Leg (Active)       Undermining is not present.    The periwoundskin is  red/pink, macerated      Plan:         1. Patient will return on 6/10/24 for nurse visit           2. As listed below, treatment provided irrigation, mechanical cleansing, and dressings to promote autolytic debridement and included application of multi-layer compression therapy.             Cleansed with: Vashe soak to wounds    Protected skin with: Remedy skin repair lotion    Dressings Applied to wound:  hydrofera blue ready, viscopaste, ABD pad    Compression Applied to the right le-Layer Compression  Compression Applied to the left le-Layer Compression    4-Layer: LAYER 1: ORTHOPAEDIC WOOL The bandage was  applied without tension in aloose spiral from base of toes to knee joint with 50% overlap.LAYER 2: LIGHT SUPPORT BANDAGE Then the next layer bandage was applied in spiral toe to knee with 50% overlap with 50% overlap.LAYER 3: LIGHT COMPRESSION BANDAGE Then the elastic conformable. compression bandage was applied at mid stretch (50%) in a figure of eight from toe to knee, with a 50% overlap.  LAYER 4: COHESIVE EXTENSIBLE BANDAGE Finally the lightweight, elastic, cohesive bandage was applied at mid stretch (50%) in a spiral with a 50 % overlap from toe to knee.    Offloading used:  Cane    Trial Products: No    Provider notified regarding concerns: No    Treatment Changes: No    Tolerated Dressing Change:  Yes    Taught Regarding: follow up appointment(s), wound cares, layered compression wraps - maximum wear time of 7 days, elevation, offloading, compliance, and signs of infection    Educational Barriers: No barriers      AUGUSTINA BECK RN

## 2024-06-10 ENCOUNTER — OFFICE VISIT (OUTPATIENT)
Dept: VASCULAR SURGERY | Facility: CLINIC | Age: 79
End: 2024-06-10
Attending: FAMILY MEDICINE
Payer: COMMERCIAL

## 2024-06-10 VITALS — HEART RATE: 72 BPM | RESPIRATION RATE: 20 BRPM | DIASTOLIC BLOOD PRESSURE: 78 MMHG | SYSTOLIC BLOOD PRESSURE: 142 MMHG

## 2024-06-10 DIAGNOSIS — L97.912 ULCER OF RIGHT LOWER EXTREMITY WITH FAT LAYER EXPOSED (H): Primary | ICD-10-CM

## 2024-06-10 PROCEDURE — 29581 APPL MULTLAYER CMPRN SYS LEG: CPT

## 2024-06-10 PROCEDURE — 97602 WOUND(S) CARE NON-SELECTIVE: CPT

## 2024-06-10 ASSESSMENT — PAIN SCALES - GENERAL: PAINLEVEL: NO PAIN (0)

## 2024-06-13 ENCOUNTER — OFFICE VISIT (OUTPATIENT)
Dept: VASCULAR SURGERY | Facility: CLINIC | Age: 79
End: 2024-06-13
Attending: FAMILY MEDICINE
Payer: COMMERCIAL

## 2024-06-13 VITALS — OXYGEN SATURATION: 95 % | HEART RATE: 74 BPM | DIASTOLIC BLOOD PRESSURE: 76 MMHG | SYSTOLIC BLOOD PRESSURE: 146 MMHG

## 2024-06-13 DIAGNOSIS — R60.0 LOCALIZED EDEMA: Primary | ICD-10-CM

## 2024-06-13 DIAGNOSIS — L97.912 ULCER OF RIGHT LOWER EXTREMITY WITH FAT LAYER EXPOSED (H): ICD-10-CM

## 2024-06-13 PROCEDURE — 29581 APPL MULTLAYER CMPRN SYS LEG: CPT | Mod: 50

## 2024-06-13 PROCEDURE — 99214 OFFICE O/P EST MOD 30 MIN: CPT | Performed by: FAMILY MEDICINE

## 2024-06-13 PROCEDURE — 99213 OFFICE O/P EST LOW 20 MIN: CPT | Mod: 25 | Performed by: FAMILY MEDICINE

## 2024-06-13 ASSESSMENT — PAIN SCALES - GENERAL: PAINLEVEL: SEVERE PAIN (6)

## 2024-06-13 NOTE — PROGRESS NOTES
Wound Clinic Note         Visit date: 06/13/2024       Cheif Complaint:     Elizabeth Kelley is a 78 year old   female had no chief complaint listed for this encounter.  The patient has lower extremity edema and bilateral leg ulcers         HISTORY OF PRESENT ILLNESS:    Elizabeth Kelley reports the right leg wound has been present since 2020.  The wound began due to the area being bumped.     The patient denies a history of congestive heart failure, previous vein procedures or previous DVT.   The left leg area started in March 2024 when she scrubbed her leg with a loofah pad.    Since her last clinic visit with me she has been coming to the wound clinic twice a week to have the multilayer wraps changed.  She reports there is been a number of occasions where the wraps have slipped down and bunched up causing new wounds to develop.  However the main wounds that she had previously are actually getting quite a bit better.        The pateint denies fevers or chills.  They report the pain from the wound has been 0/10 and has remained about the same recently.      The patient reports laying down to elevate their legs above the level of their heart at least twice a day.      She reports she has not been using her pneumatic lymphedema pumps recently because when she uses them they cause her legs to get smaller and then the multilayer wraps fall down.    She confirms she takes Lasix once a day to help control her swelling.    Today the patient reports maintaining a high protein diet, but has not been taking protein supplements lately.        The patient denies a history of diabetes, smoking or chronic steroid use.         The patient has not had any symptoms of infection relating to the wound recently and is not currently on antibiotics.       Problem List:   Past Medical History:   Diagnosis Date    Ankle contracture, left     Class 3 severe obesity due to excess calories without serious comorbidity with body mass index  (BMI) of 45.0 to 49.9 in adult (H)     Combined gastric and duodenal ulcer     Controlled substance agreement broken     Depression     Dyslipidemia     Edema     Edema     Gait abnormality     GERD (gastroesophageal reflux disease)     Gout     Lymphedema of both lower extremities     Melanoma (H)     left upper arm    MS (multiple sclerosis) (H)     RLS (restless legs syndrome)     Skin ulcer of left lower leg (H)     Valgus deformity of both feet     Vitamin D deficiency              Family Hx: family history includes Arthritis in her maternal grandmother, maternal uncle, paternal grandfather, paternal grandmother, and sister; Asthma in her sister; Brain Cancer in her father; Breast Cancer in her paternal aunt; Colon Cancer in her paternal aunt; Early Death in her maternal grandfather; Hyperlipidemia in her mother, paternal aunt, and sister; Hypertension in her mother, paternal aunt, and sister; Multiple Sclerosis in her mother and sister; Obesity in her sister; Uterine Cancer in her mother.       Surgical Hx:   Past Surgical History:   Procedure Laterality Date    ABLATION SAPHENOUS VEIN W/ RFA Right 04/12/2021    Saphenous vein, anterior accessory saphenous vein, sclerotherapy of multiple veins.    COSMETIC SURGERY  1988    reduction of excess skin from weight loss    ESOPHAGOSCOPY, GASTROSCOPY, DUODENOSCOPY (EGD), COMBINED N/A 03/30/2015    Procedure: UPPER ENDOSCOPY with gastric biopsy;  Surgeon: Checo Fletcher MD;  Location: [facility];  Service:     IRRIGATION AND DEBRIDEMENT LOWER EXTREMITY, COMBINED Right 3/21/2022    Procedure: RIGHT LEG WOUND DEBRIDEMENT, PAULIE DERMATONE, MISONIX, VAC VERA FLOW WITH VASHE;  Surgeon: Gabriele Bullock MD;  Location:  OR    IRRIGATION AND DEBRIDEMENT LOWER EXTREMITY, COMBINED Right 3/28/2022    Procedure: DEBRIDEMENT AND WOUND DRESSING CHANGE AND MYRIAD APPLICATION OF RIGHT LOWER LEG WOUND;  Surgeon: Gabriele Bullock MD;  Location:  OR    MAMMOPLASTY  REDUCTION Bilateral     MOHS MICROGRAPHIC PROCEDURE      left upper arm, melanoma    PICC SINGLE LUMEN PLACEMENT  8/13/2021         PICC SINGLE LUMEN PLACEMENT  9/2/2021         SPINE SURGERY      L5 area surgery, decompression          Allergies:    Allergies   Allergen Reactions    Other Environmental Allergy Anaphylaxis     Bees/Wasps Stings  Bees/Wasps Stings    Adhesive Tape Other (See Comments)     Red,itchy and oozes  Red,itchy and oozes    Adhesive [Cyanoacrylate] Unknown     Other reaction(s): sores    Oxycodone-Acetaminophen Rash    Vicodin [Hydrocodone-Acetaminophen] Nausea and Vomiting and Hives              Medication History:    Current Outpatient Medications   Medication Sig Dispense Refill    acetaminophen (TYLENOL) 325 MG tablet 1-2 tablets as needed Orally every 4 hrs      ammonium lactate (AMLACTIN) 12 % external cream Apply topically daily to dry skin on feet/legs 385 g 1    ammonium lactate (AMLACTIN) 12 % external cream Apply topically daily Apply to bilateral feet/heels daily 385 g 1    Ascorbic Acid (VITAMIN C) 500 MG CHEW 1 tablet Orally Two times daily with meals      aspirin (ASPIRIN 81) 81 MG chewable tablet Take 1 tablet by mouth      aspirin 81 mg chewable tablet Take 81 mg by mouth every evening       atorvastatin (LIPITOR) 20 MG tablet Take 20 mg by mouth daily      benzonatate (TESSALON) 100 MG capsule Take 1 capsule (100 mg) by mouth 3 times daily as needed for cough      Bioflavonoid Products (CINDY-C) 500-550 MG TABS Take 1 tablet by mouth 2 times daily 60 tablet 3    buPROPion (WELLBUTRIN SR) 150 MG 12 hr tablet Take 150 mg by mouth every morning       calcium carbonate (TUMS) 500 MG chewable tablet Take 1 tablet (500 mg) by mouth daily as needed for heartburn      Carboxymethylcellulose Sodium 0.25 % SOLN Apply 0.05 mLs to eye      citalopram (CELEXA) 40 MG tablet Take 40 mg by mouth every morning       [employer] Super B Complex/C TABS Take 1 tablet by mouth daily      cyanocobalamin  (VITAMIN B-12) 1000 MCG tablet Take 1 tablet (1,000 mcg) by mouth daily 60 tablet 3    cyclobenzaprine (FLEXERIL) 10 MG tablet Take 1 tablet (10 mg) by mouth every 8 hours as needed for muscle spasms      ferrous sulfate (FEROSUL) 325 (65 Fe) MG tablet Take 1 tablet (325 mg) by mouth daily      furosemide (LASIX) 40 MG tablet Take 1 tablet (40 mg) by mouth daily      gabapentin (NEURONTIN) 100 MG capsule TAKE 1 CAPSULE BY MOUTH 3 TIMES A DAY FOR 30 DAYS      gabapentin (NEURONTIN) 300 MG capsule TAKE 1 CAPSULE BY MOUTH THREE TIMES DAILY      gabapentin (NEURONTIN) 400 MG capsule Take 1 capsule (400 mg) by mouth 3 times daily      guaiFENesin (ROBITUSSIN) 20 mg/mL SOLN solution Take 10 mLs by mouth every 4 hours as needed for cough      HYDROmorphone (DILAUDID) 2 MG tablet Take 1 tablet (2 mg) by mouth every 4 hours as needed for moderate to severe pain 12 tablet 0    lidocaine (LIDODERM) 5 % patch 1 PATCH REMOVE AFTER 12 HOURS EXTERNALLY ONCE A DAY      magnesium oxide (MAG-OX) 400 MG tablet Take 1 tablet by mouth      metroNIDAZOLE (FLAGYL) 500 MG tablet Apply 1 tablet (500 mg) topically every other day 90 tablet 0    metroNIDAZOLE (FLAGYL) 500 MG tablet CRUSH 1 TABLET AND SPRINKLE ONTO THE WOUND EVERY DAY. 90 tablet 0    multivitamin w/minerals (CENTRUM ADULTS) tablet Take 1 tablet by mouth daily       nitroGLYcerin (NITROSTAT) 0.4 MG sublingual tablet PLACE 1 TABLET UNDER TONGUE EVERY 5 MINTUES AS NEEDED FOR CHEST PAIN FOR UP TO 30 DAYS      Nutritional Supplements (VARICOSE VEINS FORMULA OR) Take 1 tablet by mouth every morning      pantoprazole (PROTONIX) 40 MG EC tablet Take 1 tablet (40 mg) by mouth 2 times daily (before meals)      polyethylene glycol (MIRALAX) 17 GM/Dose powder 17 g      potassium chloride ER (KLOR-CON M) 20 MEQ CR tablet Take 20 mEq by mouth every evening (TAKE IN ADDITION TO AM/NOON DOSE FOR TOTAL 30mEq DAILY)      rOPINIRole (REQUIP) 0.5 MG tablet Take 1 tablet (0.5 mg) by mouth 2 times  "daily      rOPINIRole (REQUIP) 1 MG tablet Take 1 mg by mouth At Bedtime (TAKE IN ADDITION TO AM/NOON DOSE FOR TOTAL 2MG DAILY)      senna-docusate (SENNA S) 8.6-50 MG tablet Take 1 tablet by mouth as needed      simvastatin (ZOCOR) 40 MG tablet [SIMVASTATIN (ZOCOR) 40 MG TABLET] Take 40 mg by mouth bedtime.      Skin Protectants, Misc. (INTERDRY 10\"X36\") SHEE Externally apply 7 Units topically once as needed (change daily in groin rash/fold) 7 each 3    spironolactone (ALDACTONE) 25 MG tablet [SPIRONOLACTONE (ALDACTONE) 25 MG TABLET] Take 25 mg by mouth daily.      triamcinolone (ARISTOCORT HP) 0.5 % external cream Apply topically daily Apply to red/irritated skin around wound daily 454 g 0    triamcinolone (KENALOG) 0.1 % external cream Apply topically 2 times daily 15 g 3    triamcinolone (KENALOG) 0.1 % external cream Apply topically 2 times daily Apply to intact skin on right leg 453.6 g 0    triamcinolone (KENALOG) 0.1 % external ointment Apply topically every 48 hours To intact skin on right leg, as well as posterior calf on right leg redness 30 g 1    vitamin B-Complex Take 1 tablet by mouth daily 60 tablet 3    vitamin D3 (CHOLECALCIFEROL) 250 mcg (02349 units) capsule Take 1 capsule (250 mcg) by mouth daily 60 capsule 3    zinc 50 MG TABS Take 1 tablet by mouth       No current facility-administered medications for this visit.         Tobacco History:  reports that she quit smoking about 48 years ago. Her smoking use included cigarettes. She started smoking about 60 years ago. She has a 3 pack-year smoking history. She has never used smokeless tobacco.       REVIEW OF SYMPTOMS:   The review of systems was negative except as noted in the HPI.           PHYSICAL EXAMINATION:     BP (!) 146/76   Pulse 74   SpO2 95%            GENERAL: The patient overall appears well and is no acute distress.   HEAD: normocephalic   EYES: Sclera and conjunctiva clear   NECK: no obvious masses   LUNGS: breathing is " unlabored.   EXTREMITIES: No clubbing, cyanosis or edema   SKIN: No rashes or other abnormalities except as noted under the Wound section below.   NEUROLOGICAL: normal motor and sensory function   EDEMA: Sever  and Lymphedema       WOUND: The wound appears healthy with no sign of infection.   Wound bed: granulation tissue   Periwound: healthy intact skin  The main wounds that she had previously are indeed quite a bit smaller today compared with her last clinic visit however she does have a number of very superficial open areas on both legs.    Also see below for wound details:       Circumferential volume measures:            7/30/2021     1:00 PM 5/28/2024     2:00 PM 6/3/2024     2:00 PM 6/6/2024    11:00 AM 6/10/2024     9:00 AM   Circumferential Measures   Right just above MTP 23.3 24 23.5 23 23.5   Right Ankle 25.2 24.5 27 24 24   Right Widest Calf 61 55 53.5 57 57   Right Thigh Up 10cm 78.8       Left - just above MTP 24 23.5 23.5 23 22.3   Left Ankle 25 24 24 22 24   Left Widest Calf 51.5 56 53 54 54.2   Left Thigh Up 10cm 77.2           Ulceration(s)/Wound(s):   Please see the media tab under the chart review for pictures of the wounds.  Nursing staff removed dressings and cleansed wound.    VASC Wound Rt medial leg (Active)   Pre Size Length 4 06/13/24 1400   Pre Size Width 2.2 06/13/24 1400   Pre Size Depth 0.1 06/13/24 1400       VASC Wound Left shin (Active)   Pre Size Length 3.5 06/13/24 1400   Pre Size Width 5 06/13/24 1400   Pre Size Depth 0.1 06/13/24 1400   Pre Total Sq cm 17.5 06/13/24 1400       VASC Wound left posterior leg (Active)   Pre Size Length 3.5 06/13/24 1400   Pre Size Width 3.5 06/13/24 1400   Pre Size Depth 0.1 06/13/24 1400   Pre Total Sq cm 12.25 06/13/24 1400                                   Recent Labs   Lab Test 04/04/22  0523 06/23/16  1130   A1C 5.4 5.9          Recent Labs   Lab Test 02/10/23  1535 05/25/22  1637 04/04/22  0523   ALBUMIN 4.3 4.1 1.9*      October 27, 2021  ankle-brachial indices: Normal  October 27, 2021 right lower extremity venous insufficiency ultrasound: Normal      No sharp debridement performed today.         ASSESSMENT:   This is a 78 year old  female with lower extremity edema and bilateral leg ulcers          PLAN:   We will continue bandaging her legs with a multilayer compression wrap change once or twice a week here in the wound clinic depending on her drainage.  The nurses are confident that they can adjust how they are applying the multilayer wrap so that they are not falling down so frequently.  This does not work we will have to go back to daily dressing changes the patient would do herself using 2 different sizes of Tubigrip stockings or possibly her Velcro compression garments to apply compression.    Will also encouraged her to resume using her pneumatic lymphedema pumps and if she finds that the wraps are slipping she can call us right away and we will have her come in and reapply the wraps.    She previously had a bilateral lower extremity venous insufficiency ultrasound performed on August 3, 2023 which did show areas of superficial venous insufficiency.  She then later met with Dr. Floyd on November 13, 2023 to discuss treatment options for her venous insufficiency but he did not recommend any procedures at that time.    Separate from the discussion of her wound care we then discussed the treatment of her lower extremity edema.  I have encouraged the patient to continue to elevate the legs as they have been doing, including laying down with their legs above the level of the heart for 30 minutes at least twice a day.    I have encouraged her to use the pneumatic lymphedema pump once a day.  I have encouraged the patient to continue on their high protein diet to aid in wound healing.   The patient will return to the wound clinic once or twice a week to have the multilayer wrap changed and then she will see me again in 3-4 weeks.        30 minutes  spent on the date of the encounter doing chart review, history and exam, documentation and further activities per the note      Lars Torres MD  06/13/2024   3:15 PM   [facility]  [phone]    This note was electronically signed by Lars Torres MD

## 2024-06-13 NOTE — PROGRESS NOTES
Compression Applied to Bilateral  4-Layer: LAYER 1: ORTHOPAEDIC WOOL The bandage was applied without tension in aloose spiral from base of toes to knee joint with 50% overlap.LAYER 2: LIGHT SUPPORT BANDAGE Then the next layer bandage was applied in spiral toe to knee with 50% overlap with 50% overlap.LAYER 3: LIGHT COMPRESSION BANDAGE Then the elastic conformable. compression bandage was applied at mid stretch (50%) in a figure of eight from toe to knee, with a 50% overlap.  LAYER 4: COHESIVE EXTENSIBLE BANDAGE Finally the lightweight, elastic, cohesive bandage was applied at mid stretch (50%) in a spiral with a 50 % overlap from toe to knee.    Discussed using her leg pumps at night again. Patient did agree to start using them again on nights before her nurse visits for wrap changes.

## 2024-06-18 ENCOUNTER — OFFICE VISIT (OUTPATIENT)
Dept: VASCULAR SURGERY | Facility: CLINIC | Age: 79
End: 2024-06-18
Attending: FAMILY MEDICINE
Payer: COMMERCIAL

## 2024-06-18 VITALS
TEMPERATURE: 98.7 F | DIASTOLIC BLOOD PRESSURE: 85 MMHG | OXYGEN SATURATION: 96 % | RESPIRATION RATE: 16 BRPM | HEART RATE: 69 BPM | SYSTOLIC BLOOD PRESSURE: 137 MMHG

## 2024-06-18 DIAGNOSIS — R60.0 LOCALIZED EDEMA: ICD-10-CM

## 2024-06-18 DIAGNOSIS — L97.912 ULCER OF RIGHT LOWER EXTREMITY WITH FAT LAYER EXPOSED (H): Primary | ICD-10-CM

## 2024-06-18 PROCEDURE — 97602 WOUND(S) CARE NON-SELECTIVE: CPT

## 2024-06-18 RX ORDER — SULFAMETHOXAZOLE/TRIMETHOPRIM 800-160 MG
1 TABLET ORAL 2 TIMES DAILY
Qty: 28 TABLET | Refills: 0 | Status: SHIPPED | OUTPATIENT
Start: 2024-06-18 | End: 2024-07-02

## 2024-06-18 ASSESSMENT — PAIN SCALES - GENERAL: PAINLEVEL: NO PAIN (0)

## 2024-06-18 NOTE — PROGRESS NOTES
[facility]  -  Nurse Visit                          Date of Service:  June 18, 2024     Requesting Provider: MD Lars Torres    Diagnosis:     ICD-10-CM    1. Ulcer of right lower extremity with fat layer exposed (H)  L97.912       2. Localized edema  R60.0           Chief Complaint: Elizabeth is being seen today at [facility] [place] for her wound(s) and swelling dressing change. Reports pain of 0.  Report feeling unwell- possible fever/chills over the weekend, no appetite. Vitals fine today    Dressing on Arrival: hydrofera, ABD, kerlix, Pt is currently using tubigrip for compression- removed the 4 layer this morning so she could shower. Reports copious drainage on wraps that were removed this morning, small serous drainage on dressings removed in clinic today    New Wounds noted: No    Vital Signs: /85   Pulse 69   Temp 98.7  F (37.1  C)   Resp 16   SpO2 96%     Assessment:      General:  Patient presents to clinic in no apparent distress.   Psychiatric:  Alert and oriented x3.   Lower extremity:  edema is present.    Integumentary:  Skin is bright red    Circumferential volume measures:      5/28/2024     2:00 PM 6/3/2024     2:00 PM 6/6/2024    11:00 AM 6/10/2024     9:00 AM 6/18/2024     1:00 PM   Circumferential Measures   Right just above MTP 24 23.5 23 23.5 25   Right Ankle 24.5 27 24 24 25   Right Widest Calf 55 53.5 57 57 57   Left - just above MTP 23.5 23.5 23 22.3 24   Left Ankle 24 24 22 24 25   Left Widest Calf 56 53 54 54.2 56       Wound info:  Wound Leg Other (comment);Ulceration (Active)       Wound (used by OP [facility] only) 03/10/22 0900 Right lower;anterior leg ulceration, venous (Active)       Wound (used by OP I only) 01/23/23 1550 Right leg (Active)       Wound (used by OP I only) 06/01/23 1101 Right medial;lower leg (Active)       VASC Wound right lower leg (Active)   Pre Size Length 5 06/18/24 1300   Pre Size Width 25 06/18/24 1300   Pre  Size Depth 0.1 06/18/24 1300   Pre Total Sq cm 125 06/18/24 1300       VASC Wound Rt lateral leg (Active)   Pre Size Length 13 06/18/24 1300   Pre Size Width 57 06/18/24 1300   Pre Size Depth 0.1 06/18/24 1300   Pre Total Sq cm 741 06/18/24 1300   Post Size Length 1.8 02/08/24 1136   Post Size Width 1.5 02/08/24 1136   Post Size Depth 0.1 02/08/24 1136   Post Total Sq cm 2.7 02/08/24 1136   Description weeping 06/13/24 1400       VASC Wound Rt medial leg (Active)   Pre Size Length 3.8 06/18/24 1300   Pre Size Width 2.3 06/18/24 1300   Pre Size Depth 0.1 06/18/24 1300   Pre Total Sq cm 8.74 06/18/24 1300   Post Size Length 3.2 02/08/24 1136   Post Size Width 1.7 02/08/24 1136   Post Size Depth 0.1 02/08/24 1136   Post Total Sq cm 5.44 02/08/24 1136       VASC Wound Left shin (Active)   Pre Size Length 2.5 06/18/24 1300   Pre Size Width 4.5 06/18/24 1300   Pre Size Depth 0.1 06/18/24 1300   Pre Total Sq cm 11.25 06/18/24 1300   Product Used ABD Pad 06/03/24 1400       VASC Wound left posterior leg (Active)   Pre Size Length 3.5 06/18/24 1300   Pre Size Width 5 06/18/24 1300   Pre Size Depth 0.1 06/18/24 1300   Pre Total Sq cm 17.5 06/18/24 1300       Incision/Surgical Site 03/21/22 Right;Lower Leg (Active)       Undermining is not present.    The periwoundskin is  bright red    Called and spoke with Dr. Torres regarding concerns. Significantly larger weeping areas, RLE is bright red, pt not feeling well, etc. He started pt on Bactrim BID x14 days    Plan:         1. Patient will return in 3 days for nurse visit.            2. As listed below, treatment provided irrigation, mechanical cleansing, and dressings to promote autolytic debridement and included application of multi-layer compression therapy.             Cleansed with: soap and water and vashe    Protected skin with: Remedy skin repair lotion on LLE, triad on RLE    Dressings Applied to wound: Hydrofera blue ready transfer, ABD, and Secured with roll gauze  and tape.     Compression Applied to the right le-Layer Compression  Compression Applied to the left le-Layer Compression    4-Layer: LAYER 1: ORTHOPAEDIC WOOL The bandage was applied without tension in aloose spiral from base of toes to knee joint with 50% overlap.LAYER 2: LIGHT SUPPORT BANDAGE Then the next layer bandage was applied in spiral toe to knee with 50% overlap with 50% overlap.LAYER 3: LIGHT COMPRESSION BANDAGE Then the elastic conformable. compression bandage was applied at mid stretch (50%) in a figure of eight from toe to knee, with a 50% overlap.  LAYER 4: COHESIVE EXTENSIBLE BANDAGE Finally the lightweight, elastic, cohesive bandage was applied at mid stretch (50%) in a spiral with a 50 % overlap from toe to knee.    Offloading used: None    Trial Products: No    Provider notified regarding concerns: Yes: as above    Treatment Changes: No    Tolerated Dressing Change:  Yes    Taught Regarding: follow up appointment(s), wound cares, and compression, infection, follow up in ED if not feeling better in the next couple of days    Educational Barriers: No barriers      TALA MARTINEZ RN CWOCN, CFCN

## 2024-06-18 NOTE — PATIENT INSTRUCTIONS
"  Wound Care Instructions    TWICE PER WEEK IN THE CLINIC and as needed, Cleanse your right leg and left leg wound(s) with 5 minute vashe soak.    Pat Dry with non-sterile gauze    Primary Dressing: Apply hydrofera blue ready transfer into/onto the wounds    Secondary dressing: Cover with ABDs    Secure with non-sterile roll gauze (4\" x 75\" roll) and tape (1\" roll tape) as needed; avoid adhesive directly on the skin    Compression: 4 layer bilaterally    SEEK MEDICAL CARE IF:  You have an increase in swelling, pain, or redness around the wound.  You have an increase in the amount of pus coming from the wound.  There is a bad smell coming from the wound.  The wound appears to be worsening/enlarging  You have a fever greater than 101.5 F    It is recommended that you elevate your legs throughout the day: approx 2-3 times each day  Elevate them above the level of your heart for 30 min.   Ways to do this:   Lay on the couch or your bed and prop your legs up on pillows   Recline back as far as you can go in your recliner and prop your legs on pillows.     Doing these things will help reduce the edema in your legs.      We want to hear from you!  In the next few weeks, you should receive a call or email to complete a survey about your visit at [facility]. Please help us improve your appointment experience by letting us know how we did today. We strive to make your experience good and value any ways in which we could do better.      We value your input and suggestions.    Thank you for choosing the [facility]!      "

## 2024-06-21 ENCOUNTER — OFFICE VISIT (OUTPATIENT)
Dept: VASCULAR SURGERY | Facility: CLINIC | Age: 79
End: 2024-06-21
Attending: FAMILY MEDICINE
Payer: COMMERCIAL

## 2024-06-21 VITALS
HEART RATE: 72 BPM | SYSTOLIC BLOOD PRESSURE: 140 MMHG | RESPIRATION RATE: 16 BRPM | DIASTOLIC BLOOD PRESSURE: 72 MMHG | TEMPERATURE: 98.6 F

## 2024-06-21 DIAGNOSIS — I83.019 VARICOSE VEINS WITH ULCER, RIGHT (H): ICD-10-CM

## 2024-06-21 DIAGNOSIS — L97.919 VARICOSE VEINS WITH ULCER, RIGHT (H): ICD-10-CM

## 2024-06-21 DIAGNOSIS — L97.912 ULCER OF RIGHT LOWER EXTREMITY WITH FAT LAYER EXPOSED (H): Primary | ICD-10-CM

## 2024-06-21 PROCEDURE — 97602 WOUND(S) CARE NON-SELECTIVE: CPT

## 2024-06-21 ASSESSMENT — PAIN SCALES - GENERAL: PAINLEVEL: MILD PAIN (2)

## 2024-06-21 NOTE — PATIENT INSTRUCTIONS
- Please call us if your compression wraps fall more than 1-2 inches below the bend of the knee. Remove the wraps if you experience any shortness of breath or notice your toes turning blue/purple and then give us a call. Call if they are too painful. Call if they get wet. If it is a weekend and the wraps fall down, are too painful, or get wet take the wraps off and put on another form of compression. Compression such as velcro wraps, compression stockings, short stretch bandages, or tubular compression. Apply one of these until you can be seen in clinic. The layered compression wraps are safe to have on for ONLY 7 days. If for some reason you are not able to get the wrap(s) changed (due to illness; lack of supplies, lack of help, lack of transportation) please do the following: unwrap the old 2 or 4 layer compression wrap; avoid using scissors as you could cut your skin and cause wounds; use tubular compression when available. Call to reschedule your home care or clinic visit appointment as soon as possible.     - Treatment:  Layered Compression Bandaging (4-layer)    What is it?  The layered compression bandaging has a layer of absorbent material that will soak up drainage.     Why we do it.   This is done to treat swelling, wounds, or both.  This will in turn help circulation and healing.    How to care for your bandages.  The wraps need to be kept dry. If  the wraps become wet, remove them and call the clinic to have another wrap applied.    What to expect.  It is common for the wraps to be uncomfortable at the beginning. The first two days are usually the hardest; then they will become more comfortable.       Elevating your legs will help the discomfort. Try to elevate your legs as much as possible.    If rest and elevation does not help your discomfort, call your provider.  If your provider is not available you can remove the wrap and leave a message for further instructions.    - Swelling and Compression  Therapy    Swelling in the legs can be caused by many reasons. No matter what the reason, treatment usually includes some type of compression. This may be done with a support sock, dressing, ace wrap, or layered wraps.     It is important to treat the swelling for many reasons. If the swelling is not treated you may develop blisters that can lead to ulcerations. This is caused when extra fluid goes into tissue causing damage and blocking blood flow to the tissue.     It is important that you wear your compression every day, including days that you will be seen in clinic.     Compression is often the most important part of treating leg wounds. Without controlling the swelling it is often not possible to heal wounds.     Going without compression for even brief periods of time can be damaging to your legs and your health.  Your compression should be put on first thing in the morning. Take the compression off at night only when instructed by your care provider to do so. Sometimes wearing compression 24 hours a day will be recommended.       If you are having difficulty wearing your compression it is important to notify your care provider so that other options may be reviewed.    Thank you for choosing  [facility]. Please call us if you have any questions [phone].

## 2024-06-21 NOTE — PROGRESS NOTES
[facility]  -  Nurse Visit                      Date of Service:  June 21, 2024     Requesting Provider: MD Lars Torres    Diagnosis:     ICD-10-CM    1. Ulcer of right lower extremity with fat layer exposed (H)  L97.912       2. Varicose veins with ulcer, right (H)  I83.019     L97.919           Chief Complaint: Elizabeth is being seen today at [facility] [place] for her wound(s) and swelling dressing change. Reports pain of 2/10.  Denies any fevers, chills, or generalized ill feeling.     Dressing on Arrival: hydrofera blue, Pt is currently using 4 layers for compression and did tolerate well. Upon removal of dressings heavy Serous drainage is noted.    New Wounds noted: No    Vital Signs: BP (!) 140/72   Pulse 72   Temp 98.6  F (37  C)   Resp 16     Assessment:      General:  Patient presents to clinic in no apparent distress.   Psychiatric:  Alert and oriented x3.   Lower extremity:  edema is present.    Integumentary:  Skin is WNL    Circumferential volume measures:      5/28/2024     2:00 PM 6/3/2024     2:00 PM 6/6/2024    11:00 AM 6/10/2024     9:00 AM 6/18/2024     1:00 PM   Circumferential Measures   Right just above MTP 24 23.5 23 23.5 25   Right Ankle 24.5 27 24 24 25   Right Widest Calf 55 53.5 57 57 57   Left - just above MTP 23.5 23.5 23 22.3 24   Left Ankle 24 24 22 24 25   Left Widest Calf 56 53 54 54.2 56       Wound info:  Wound Leg Other (comment);Ulceration (Active)       Wound (used by OP I only) 03/10/22 0900 Right lower;anterior leg ulceration, venous (Active)       Wound (used by OP I only) 01/23/23 1550 Right leg (Active)       Wound (used by OP I only) 06/01/23 1101 Right medial;lower leg (Active)       VASC Wound right lower leg (Active)   Pre Size Length 5 06/18/24 1300   Pre Size Width 25 06/18/24 1300   Pre Size Depth 0.1 06/18/24 1300   Pre Total Sq cm 125 06/18/24 1300       VASC Wound Rt lateral leg (Active)   Pre Size Length 13  24 1000   Pre Size Width 57 24 1000   Pre Size Depth 0.1 24 1000   Pre Total Sq cm 741 24 1000   Post Size Length 1.8 24 1136   Post Size Width 1.5 24 1136   Post Size Depth 0.1 24 1136   Post Total Sq cm 2.7 24 1136   Description weeping 24 1400       VASC Wound Rt medial leg (Active)   Pre Size Length 2.5 24 1000   Pre Size Width 2.1 24 1000   Pre Size Depth 0.1 24 1000   Pre Total Sq cm 5.25 24 1000   Post Size Length 3.2 24 1136   Post Size Width 1.7 24 1136   Post Size Depth 0.1 24 1136   Post Total Sq cm 5.44 24 1136       VASC Wound Left shin (Active)   Pre Size Length 2.5 24 1000   Pre Size Width 4 24 1000   Pre Size Depth 0.1 24 1000   Pre Total Sq cm 10 24 1000   Product Used ABD Pad 24 1400       VASC Wound left posterior leg (Active)   Pre Size Length 3.5 24 1300   Pre Size Width 5 24 1300   Pre Size Depth 0.1 24 1300   Pre Total Sq cm 17.5 24 1300       Incision/Surgical Site 22 Right;Lower Leg (Active)       Undermining is not present.    The periwoundskin is  pink, improved redness and decrease in drainage noted      Plan:         1. Patient will return in 3 days for nurse visit.            2. As listed below, treatment provided irrigation, mechanical cleansing, and dressings to promote autolytic debridement and included application of multi-layer compression therapy.             Cleansed with: vashe    Protected skin with: Remedy skin repair lotion and triad    Dressings Applied to wound: Hydrofera blue ready transfer    Compression Applied to the right le-Layer Compression  Compression Applied to the left le-Layer Compression    4-Layer: LAYER 1: ORTHOPAEDIC WOOL The bandage was applied without tension in aloose spiral from base of toes to knee joint with 50% overlap.LAYER 2: LIGHT SUPPORT BANDAGE Then the next layer bandage was  applied in spiral toe to knee with 50% overlap with 50% overlap.LAYER 3: LIGHT COMPRESSION BANDAGE Then the elastic conformable. compression bandage was applied at mid stretch (50%) in a figure of eight from toe to knee, with a 50% overlap.  LAYER 4: COHESIVE EXTENSIBLE BANDAGE Finally the lightweight, elastic, cohesive bandage was applied at mid stretch (50%) in a spiral with a 50 % overlap from toe to knee.    Offloading used:  cane    Trial Products: No    Provider notified regarding concerns: No    Treatment Changes: No    Tolerated Dressing Change:  Yes    Taught Regarding: follow up appointment(s), wound cares, compression, layered compression wraps - maximum wear time of 7 days, elevation, and compliance    Educational Barriers: No barriers      RACHAEL CLARKE LPN

## 2024-06-24 ENCOUNTER — OFFICE VISIT (OUTPATIENT)
Dept: VASCULAR SURGERY | Facility: CLINIC | Age: 79
End: 2024-06-24
Attending: FAMILY MEDICINE
Payer: COMMERCIAL

## 2024-06-24 VITALS
RESPIRATION RATE: 16 BRPM | DIASTOLIC BLOOD PRESSURE: 75 MMHG | SYSTOLIC BLOOD PRESSURE: 125 MMHG | TEMPERATURE: 98.2 F | HEART RATE: 70 BPM | OXYGEN SATURATION: 97 %

## 2024-06-24 DIAGNOSIS — L97.912 ULCER OF RIGHT LOWER EXTREMITY WITH FAT LAYER EXPOSED (H): Primary | ICD-10-CM

## 2024-06-24 DIAGNOSIS — L97.919 VARICOSE VEINS WITH ULCER, RIGHT (H): ICD-10-CM

## 2024-06-24 DIAGNOSIS — I83.019 VARICOSE VEINS WITH ULCER, RIGHT (H): ICD-10-CM

## 2024-06-24 PROCEDURE — 97602 WOUND(S) CARE NON-SELECTIVE: CPT

## 2024-06-24 ASSESSMENT — PAIN SCALES - GENERAL: PAINLEVEL: MODERATE PAIN (4)

## 2024-06-24 NOTE — PROGRESS NOTES
[facility]  -  Nurse Visit                      Date of Service:  June 24, 2024     Requesting Provider: MD Lars Torres    Diagnosis:     ICD-10-CM    1. Ulcer of right lower extremity with fat layer exposed (H)  L97.912       2. Varicose veins with ulcer, right (H)  I83.019     L97.919           Chief Complaint: Elizabeth is being seen today at [facility] [place] for her wound(s) and swelling dressing change. Reports pain of 4.  Denies any fevers, chills, or generalized ill feeling.     Dressing on Arrival: hydrofera, dry, Pt is currently using 4 layer for compression and did not tolerate well. Removed the compresison from [facility] on Saturday night due to pain. Upon removal of dressings heavy Serous drainage is noted.    New Wounds noted: No    Vital Signs: /75   Pulse 70   Temp 98.2  F (36.8  C)   Resp 16   SpO2 97%     Assessment:      General:  Patient presents to clinic in no apparent distress.   Psychiatric:  Alert and oriented x3.   Lower extremity:  edema is present.    Integumentary:  Skin is intact    Circumferential volume measures:      5/28/2024     2:00 PM 6/3/2024     2:00 PM 6/6/2024    11:00 AM 6/10/2024     9:00 AM 6/18/2024     1:00 PM   Circumferential Measures   Right just above MTP 24 23.5 23 23.5 25   Right Ankle 24.5 27 24 24 25   Right Widest Calf 55 53.5 57 57 57   Left - just above MTP 23.5 23.5 23 22.3 24   Left Ankle 24 24 22 24 25   Left Widest Calf 56 53 54 54.2 56       Wound info:  Wound Leg Other (comment);Ulceration (Active)       Wound (used by OP I only) 03/10/22 0900 Right lower;anterior leg ulceration, venous (Active)       Wound (used by OP I only) 01/23/23 1550 Right leg (Active)       Wound (used by OP I only) 06/01/23 1101 Right medial;lower leg (Active)       VASC Wound right lower leg (Active)   Pre Size Length 0.5 06/24/24 1200   Pre Size Width 0.5 06/24/24 1200   Pre Size Depth 0.1 06/24/24 1200   Pre Total Sq cm  0.25 24 1200       VASC Wound Rt lateral leg (Active)   Pre Size Length 13 24 1200   Pre Size Width 57 24 1200   Pre Size Depth 0.1 24 1200   Pre Total Sq cm 741 24 1200   Post Size Length 1.8 24 1136   Post Size Width 1.5 24 1136   Post Size Depth 0.1 24 1136   Post Total Sq cm 2.7 24 1136   Description weeping 24 1400       VASC Wound Rt medial leg (Active)   Pre Size Length 2.5 24 1200   Pre Size Width 2 24 1200   Pre Size Depth 0.1 24 1200   Pre Total Sq cm 5.25 24 1000   Post Size Length 3.2 24 1136   Post Size Width 1.7 24 1136   Post Size Depth 0.1 24 1136   Post Total Sq cm 5.44 24 1136       VASC Wound Left shin (Active)   Pre Size Length 2.5 24 1200   Pre Size Width 4 24 1200   Pre Size Depth 0.1 24 1200   Pre Total Sq cm 10 24 1200   Product Used ABD Pad 24 1400       VASC Wound left posterior leg (Active)   Pre Size Length 2 24 1200   Pre Size Width 2 24 1200   Pre Size Depth 0.1 24 1200   Pre Total Sq cm 4 24 1200       Incision/Surgical Site 22 Right;Lower Leg (Active)       Undermining is not present.    The periwoundskin is WNL      Plan:         1. Patient will return in 3 days for nurse visit.            2. As listed below, treatment provided irrigation, mechanical cleansing, and dressings to promote autolytic debridement and included application of multi-layer compression therapy.             Cleansed with: soap and water and vashe    Protected skin with: Remedy skin repair lotion and triad    Dressings Applied to wound: Hydrofera blue ready transfer, ABD, and Secured with roll gauze and tape.     Compression Applied to the right le-Layer Compression  Compression Applied to the left le-Layer Compression    4-Layer: LAYER 1: ORTHOPAEDIC WOOL The bandage was applied without tension in aloose spiral from base of toes to knee  joint with 50% overlap.LAYER 2: LIGHT SUPPORT BANDAGE Then the next layer bandage was applied in spiral toe to knee with 50% overlap with 50% overlap.LAYER 3: LIGHT COMPRESSION BANDAGE Then the elastic conformable. compression bandage was applied at mid stretch (50%) in a figure of eight from toe to knee, with a 50% overlap.  LAYER 4: COHESIVE EXTENSIBLE BANDAGE Finally the lightweight, elastic, cohesive bandage was applied at mid stretch (50%) in a spiral with a 50 % overlap from toe to knee.    Offloading used: None    Trial Products: No    Provider notified regarding concerns: No    Treatment Changes: No    Tolerated Dressing Change:  Yes    Taught Regarding: follow up appointment(s), wound cares, and compression    Educational Barriers: No barriers      TALA MARTINEZ RN CWOCN, CFCN

## 2024-06-24 NOTE — PATIENT INSTRUCTIONS
- Please call us if your compression wraps fall more than 1-2 inches below the bend of the knee. Remove the wraps if you experience any shortness of breath or notice your toes turning blue/purple and then give us a call. Call if they are too painful. Call if they get wet. If it is a weekend and the wraps fall down, are too painful, or get wet take the wraps off and put on another form of compression. Compression such as velcro wraps, compression stockings, short stretch bandages, or tubular compression. Apply one of these until you can be seen in clinic. The layered compression wraps are safe to have on for ONLY 7 days. If for some reason you are not able to get the wrap(s) changed (due to illness; lack of supplies, lack of help, lack of transportation) please do the following: unwrap the old 2 or 4 layer compression wrap; avoid using scissors as you could cut your skin and cause wounds; use tubular compression when available. Call to reschedule your home care or clinic visit appointment as soon as possible.     - Treatment:  Layered Compression Bandaging (4-layer)    What is it?  The layered compression bandaging has a layer of absorbent material that will soak up drainage.     Why we do it.   This is done to treat swelling, wounds, or both.  This will in turn help circulation and healing.    How to care for your bandages.  The wraps need to be kept dry. If  the wraps become wet, remove them and call the clinic to have another wrap applied.    What to expect.  It is common for the wraps to be uncomfortable at the beginning. The first two days are usually the hardest; then they will become more comfortable.       Elevating your legs will help the discomfort. Try to elevate your legs as much as possible.    If rest and elevation does not help your discomfort, call your provider.  If your provider is not available you can remove the wrap and leave a message for further instructions.    - Swelling and Compression  Therapy    Swelling in the legs can be caused by many reasons. No matter what the reason, treatment usually includes some type of compression. This may be done with a support sock, dressing, ace wrap, or layered wraps.     It is important to treat the swelling for many reasons. If the swelling is not treated you may develop blisters that can lead to ulcerations. This is caused when extra fluid goes into tissue causing damage and blocking blood flow to the tissue.     It is important that you wear your compression every day, including days that you will be seen in clinic.     Compression is often the most important part of treating leg wounds. Without controlling the swelling it is often not possible to heal wounds.     Going without compression for even brief periods of time can be damaging to your legs and your health.  Your compression should be put on first thing in the morning. Take the compression off at night only when instructed by your care provider to do so. Sometimes wearing compression 24 hours a day will be recommended.       If you are having difficulty wearing your compression it is important to notify your care provider so that other options may be reviewed.    Thank you for choosing  [facility]. Please call us if you have any questions [phone].

## 2024-06-27 ENCOUNTER — OFFICE VISIT (OUTPATIENT)
Dept: VASCULAR SURGERY | Facility: CLINIC | Age: 79
End: 2024-06-27
Attending: FAMILY MEDICINE
Payer: COMMERCIAL

## 2024-06-27 VITALS
RESPIRATION RATE: 16 BRPM | TEMPERATURE: 97.5 F | DIASTOLIC BLOOD PRESSURE: 75 MMHG | HEART RATE: 60 BPM | SYSTOLIC BLOOD PRESSURE: 125 MMHG

## 2024-06-27 DIAGNOSIS — L97.912 ULCER OF RIGHT LOWER EXTREMITY WITH FAT LAYER EXPOSED (H): Primary | ICD-10-CM

## 2024-06-27 PROCEDURE — 97602 WOUND(S) CARE NON-SELECTIVE: CPT

## 2024-06-27 ASSESSMENT — PAIN SCALES - GENERAL: PAINLEVEL: MODERATE PAIN (4)

## 2024-06-27 NOTE — PATIENT INSTRUCTIONS
- Please call us if your compression wraps fall more than 1-2 inches below the bend of the knee. Remove the wraps if you experience any shortness of breath or notice your toes turning blue/purple and then give us a call. Call if they are too painful. Call if they get wet. If it is a weekend and the wraps fall down, are too painful, or get wet take the wraps off and put on another form of compression. Compression such as velcro wraps, compression stockings, short stretch bandages, or tubular compression. Apply one of these until you can be seen in clinic. The layered compression wraps are safe to have on for ONLY 7 days. If for some reason you are not able to get the wrap(s) changed (due to illness; lack of supplies, lack of help, lack of transportation) please do the following: unwrap the old 2 or 4 layer compression wrap; avoid using scissors as you could cut your skin and cause wounds; use tubular compression when available. Call to reschedule your home care or clinic visit appointment as soon as possible.      - Treatment:  Layered Compression Bandaging (4-layer)     What is it?  The layered compression bandaging has a layer of absorbent material that will soak up drainage.      Why we do it.   This is done to treat swelling, wounds, or both.  This will in turn help circulation and healing.     How to care for your bandages.  The wraps need to be kept dry. If  the wraps become wet, remove them and call the clinic to have another wrap applied.     What to expect.  It is common for the wraps to be uncomfortable at the beginning. The first two days are usually the hardest; then they will become more comfortable.       Elevating your legs will help the discomfort. Try to elevate your legs as much as possible.     If rest and elevation does not help your discomfort, call your provider.  If your provider is not available you can remove the wrap and leave a message for further instructions.     - Swelling and  Compression Therapy     Swelling in the legs can be caused by many reasons. No matter what the reason, treatment usually includes some type of compression. This may be done with a support sock, dressing, ace wrap, or layered wraps.      It is important to treat the swelling for many reasons. If the swelling is not treated you may develop blisters that can lead to ulcerations. This is caused when extra fluid goes into tissue causing damage and blocking blood flow to the tissue.      It is important that you wear your compression every day, including days that you will be seen in clinic.      Compression is often the most important part of treating leg wounds. Without controlling the swelling it is often not possible to heal wounds.      Going without compression for even brief periods of time can be damaging to your legs and your health.  Your compression should be put on first thing in the morning. Take the compression off at night only when instructed by your care provider to do so. Sometimes wearing compression 24 hours a day will be recommended.        If you are having difficulty wearing your compression it is important to notify your care provider so that other options may be reviewed.     Thank you for choosing [facility]. Please call us if you have any questions [phone].

## 2024-06-27 NOTE — PROGRESS NOTES
[facility]  -  Nurse Visit        Date of Service:  June 27, 2024     Requesting Provider: MD Lars Torres    Diagnosis:     ICD-10-CM    1. Ulcer of right lower extremity with fat layer exposed (H)  L97.912           Chief Complaint: Elizabeth is being seen today at [facility] for her bilateral leg wounds and swelling dressing change. Reports pain of 4.  Denies any fevers, chills, or generalized ill feeling.     Dressing on Arrival: Bilateral 4-layer. Wraps had rolled up on foot on both legs. [place] reports pain in right ankle area. Pt is currently using 4-layer bilaterally for compression and did not tolerate well. Upon removal of dressings copious serous drainage is noted. Right leg wrap was saturated through and sock and shoe were wet.     New Wounds noted: No    Right medial leg 6/27    Right leg 6/27    Left shin 6/27    Left lateral leg 6/27    Bilateral legs 6/27    Right ankle 6/27    Vital Signs: /75   Pulse 60   Temp 97.5  F (36.4  C)   Resp 16     Assessment:      General:  Patient presents to clinic in no apparent distress.   Psychiatric:  Alert and oriented x3.   Lower extremity:  edema is present.    Integumentary:  Skin is red (see photos)    Circumferential volume measures:      5/28/2024     2:00 PM 6/3/2024     2:00 PM 6/6/2024    11:00 AM 6/10/2024     9:00 AM 6/18/2024     1:00 PM   Circumferential Measures   Right just above MTP 24 23.5 23 23.5 25   Right Ankle 24.5 27 24 24 25   Right Widest Calf 55 53.5 57 57 57   Left - just above MTP 23.5 23.5 23 22.3 24   Left Ankle 24 24 22 24 25   Left Widest Calf 56 53 54 54.2 56       Wound info:  Wound Leg Other (comment);Ulceration (Active)       Wound (used by OP I only) 03/10/22 0900 Right lower;anterior leg ulceration, venous (Active)       Wound (used by OP I only) 01/23/23 1550 Right leg (Active)       Wound (used by OP I only) 06/01/23 1101 Right medial;lower leg (Active)       VASC  Wound right lower leg (Active)   Pre Size Length 0.5 24 1200   Pre Size Width 1 24 1200   Pre Size Depth 0.1 24 1200   Pre Total Sq cm 0.5 24 1200       VASC Wound Rt lateral leg (Active)   Pre Size Length 13 24 1200   Pre Size Width 57 24 1200   Pre Size Depth 0.1 24 1200   Pre Total Sq cm 741 24 1200   Post Size Length 1.8 24 1136   Post Size Width 1.5 24 1136   Post Size Depth 0.1 24 1136   Post Total Sq cm 2.7 24 1136   Description weeping 24 1400       VASC Wound Rt medial leg (Active)   Pre Size Length 2.5 24 1200   Pre Size Width 2.4 24 1200   Pre Size Depth 0.1 24 1200   Pre Total Sq cm 6 24 1200   Post Size Length 3.2 24 1136   Post Size Width 1.7 24 1136   Post Size Depth 0.1 24 1136   Post Total Sq cm 5.44 24 1136       VASC Wound Left shin (Active)   Pre Size Length 2.5 24 1200   Pre Size Width 4 24 1200   Pre Size Depth 0.1 24 1200   Pre Total Sq cm 10 24 1200   Product Used ABD Pad 24 1400       VASC Wound left posterior leg (Active)   Pre Size Length 2 24 1200   Pre Size Width 2.4 24 1200   Pre Size Depth 0.1 24 1200   Pre Total Sq cm 4.8 24 1200       Incision/Surgical Site 22 Right;Lower Leg (Active)       Undermining is not present.    The periwoundskin is  pink      Plan:         1. Patient will return in 5 days for nurse visit.            2. As listed below, treatment provided irrigation, mechanical cleansing, and dressings to promote autolytic debridement and included application of multi-layer compression therapy.             Cleansed with: Vashe    Protected skin with: Remedy skin repair lotion (to intact skin)    Dressings Applied to wound: Hydrofera blue ready transfer, ABD pad, roll gauze     Compression Applied to the right le-Layer Compression  Compression Applied to the left le-Layer  Compression    4-Layer: LAYER 1: ORTHOPAEDIC WOOL The bandage was applied without tension in aloose spiral from base of toes to knee joint with 50% overlap.LAYER 2: LIGHT SUPPORT BANDAGE Then the next layer bandage was applied in spiral toe to knee with 50% overlap with 50% overlap.LAYER 3: LIGHT COMPRESSION BANDAGE Then the elastic conformable. compression bandage was applied at mid stretch (50%) in a figure of eight from toe to knee, with a 50% overlap.  LAYER 4: COHESIVE EXTENSIBLE BANDAGE Finally the lightweight, elastic, cohesive bandage was applied at mid stretch (50%) in a spiral with a 50 % overlap from toe to knee.    Offloading used: None    Trial Products: No    Provider notified regarding concerns: No    Treatment Changes: No    Tolerated Dressing Change:  Yes    Taught Regarding: follow up appointment(s), wound cares, wound care supplies, compression, layered compression wraps - maximum wear time of 7 days, offloading, and compliance    Educational Barriers: No barriers      Kelsy Navarro RN, WTA-C, [facility]

## 2024-07-01 ENCOUNTER — OFFICE VISIT (OUTPATIENT)
Dept: VASCULAR SURGERY | Facility: CLINIC | Age: 79
End: 2024-07-01
Attending: FAMILY MEDICINE
Payer: COMMERCIAL

## 2024-07-01 VITALS
HEART RATE: 65 BPM | DIASTOLIC BLOOD PRESSURE: 81 MMHG | RESPIRATION RATE: 12 BRPM | SYSTOLIC BLOOD PRESSURE: 152 MMHG | TEMPERATURE: 97.7 F

## 2024-07-01 DIAGNOSIS — I83.019 VARICOSE VEINS WITH ULCER, RIGHT (H): ICD-10-CM

## 2024-07-01 DIAGNOSIS — L97.919 VARICOSE VEINS WITH ULCER, RIGHT (H): ICD-10-CM

## 2024-07-01 DIAGNOSIS — L97.912 ULCER OF RIGHT LOWER EXTREMITY WITH FAT LAYER EXPOSED (H): Primary | ICD-10-CM

## 2024-07-01 PROCEDURE — 97602 WOUND(S) CARE NON-SELECTIVE: CPT

## 2024-07-01 ASSESSMENT — PAIN SCALES - GENERAL: PAINLEVEL: NO PAIN (0)

## 2024-07-01 NOTE — PATIENT INSTRUCTIONS
- Please call us if your compression wraps fall more than 1-2 inches below the bend of the knee. Remove the wraps if you experience any shortness of breath or notice your toes turning blue/purple and then give us a call. Call if they are too painful. Call if they get wet. If it is a weekend and the wraps fall down, are too painful, or get wet take the wraps off and put on another form of compression. Compression such as velcro wraps, compression stockings, short stretch bandages, or tubular compression. Apply one of these until you can be seen in clinic. The layered compression wraps are safe to have on for ONLY 7 days. If for some reason you are not able to get the wrap(s) changed (due to illness; lack of supplies, lack of help, lack of transportation) please do the following: unwrap the old 2 or 4 layer compression wrap; avoid using scissors as you could cut your skin and cause wounds; use tubular compression when available. Call to reschedule your home care or clinic visit appointment as soon as possible.      - Treatment:  Layered Compression Bandaging (4-layer)     What is it?  The layered compression bandaging has a layer of absorbent material that will soak up drainage.      Why we do it.   This is done to treat swelling, wounds, or both.  This will in turn help circulation and healing.     How to care for your bandages.  The wraps need to be kept dry. If  the wraps become wet, remove them and call the clinic to have another wrap applied.     What to expect.  It is common for the wraps to be uncomfortable at the beginning. The first two days are usually the hardest; then they will become more comfortable.       Elevating your legs will help the discomfort. Try to elevate your legs as much as possible.     If rest and elevation does not help your discomfort, call your provider.  If your provider is not available you can remove the wrap and leave a message for further instructions.     - Swelling and  Compression Therapy     Swelling in the legs can be caused by many reasons. No matter what the reason, treatment usually includes some type of compression. This may be done with a support sock, dressing, ace wrap, or layered wraps.      It is important to treat the swelling for many reasons. If the swelling is not treated you may develop blisters that can lead to ulcerations. This is caused when extra fluid goes into tissue causing damage and blocking blood flow to the tissue.      It is important that you wear your compression every day, including days that you will be seen in clinic.      Compression is often the most important part of treating leg wounds. Without controlling the swelling it is often not possible to heal wounds.      Going without compression for even brief periods of time can be damaging to your legs and your health.  Your compression should be put on first thing in the morning. Take the compression off at night only when instructed by your care provider to do so. Sometimes wearing compression 24 hours a day will be recommended.        If you are having difficulty wearing your compression it is important to notify your care provider so that other options may be reviewed.     Thank you for choosing [facility]. Please call us if you have any questions [phone].

## 2024-07-01 NOTE — PROGRESS NOTES
[facility]  -  Nurse Visit        Date of Service:  July 1, 2024     Requesting Provider: MD Lars Torres    Diagnosis:     ICD-10-CM    1. Ulcer of right lower extremity with fat layer exposed (H)  L97.912       2. Varicose veins with ulcer, right (H)  I83.019     L97.919           Chief Complaint: Elizabeth is being seen today at [facility] [place] for her wound(s) and swelling dressing change. Reports pain of 0/10.  Denies any fevers, chills, or generalized ill feeling. Continues on antibiotic for increased redness. Has slightly improved. Drainage and redness continues. No fever or chills.    Dressing on Arrival: hydroferablueabd/ 4 layer bilaterally, right slid off of foot / right heel mascerated and left slid down., Pt is currently using bilateral for 4 layer wraps compression and did tolerate well. Upon removal of dressings moderate/ large Serosanguinous drainage is noted.    New Wounds noted: No    Vital Signs: BP (!) 152/81   Pulse 65   Temp 97.7  F (36.5  C)   Resp 12     Assessment:      General:  Patient presents to clinic in no apparent distress.   Psychiatric:  Alert and oriented x3.   Lower extremity:  edema is present.    Integumentary:  Skin is red and weeping right > left.    Circumferential volume measures:      5/28/2024     2:00 PM 6/3/2024     2:00 PM 6/6/2024    11:00 AM 6/10/2024     9:00 AM 6/18/2024     1:00 PM   Circumferential Measures   Right just above MTP 24 23.5 23 23.5 25   Right Ankle 24.5 27 24 24 25   Right Widest Calf 55 53.5 57 57 57   Left - just above MTP 23.5 23.5 23 22.3 24   Left Ankle 24 24 22 24 25   Left Widest Calf 56 53 54 54.2 56       Wound info:  Wound Leg Other (comment);Ulceration (Active)       Wound (used by OP I only) 03/10/22 0900 Right lower;anterior leg ulceration, venous (Active)       Wound (used by OP I only) 01/23/23 1550 Right leg (Active)       Wound (used by [facility] only) 06/01/23  1101 Right medial;lower leg (Active)       VASC Wound right lower leg (Active)   Pre Size Length 0.5 07/01/24 0900   Pre Size Width 1 07/01/24 0900   Pre Size Depth 0.1 07/01/24 0900   Pre Total Sq cm 0.5 07/01/24 0900   Description weeping 07/01/24 0900   Product Used ABD Pad 07/01/24 0900       VASC Wound Rt lateral leg (Active)   Pre Size Length 13 07/01/24 0900   Pre Size Width 57 07/01/24 0900   Pre Size Depth 0.1 07/01/24 0900   Pre Total Sq cm 741 07/01/24 0900   Post Size Length 1.8 02/08/24 1136   Post Size Width 1.5 02/08/24 1136   Post Size Depth 0.1 02/08/24 1136   Post Total Sq cm 2.7 02/08/24 1136   Description weeping/heel mascerated 07/01/24 0900   Product Used ABD Pad 07/01/24 0900       VASC Wound Rt medial leg (Active)   Pre Size Length 2.5 07/01/24 0900   Pre Size Width 2 07/01/24 0900   Pre Size Depth 0.1 07/01/24 0900   Pre Total Sq cm 5 07/01/24 0900   Post Size Length 3.2 02/08/24 1136   Post Size Width 1.7 02/08/24 1136   Post Size Depth 0.1 02/08/24 1136   Post Total Sq cm 5.44 02/08/24 1136   Description weeping 07/01/24 0900   Product Used ABD Pad 07/01/24 0900       VASC Wound Left shin (Active)   Pre Size Length 2.5 07/01/24 0900   Pre Size Width 4 07/01/24 0900   Pre Size Depth 0.1 07/01/24 0900   Pre Total Sq cm 10 07/01/24 0900   Product Used ABD Pad 07/01/24 0900       VASC Wound left posterior leg (Active)   Pre Size Length 2 07/01/24 0900   Pre Size Width 2 07/01/24 0900   Pre Size Depth 0.1 07/01/24 0900   Pre Total Sq cm 4 07/01/24 0900       Incision/Surgical Site 03/21/22 Right;Lower Leg (Active)       Undermining is not present.    The periwoundskin is erythema and maceration      Plan:         1. Patient will return in 4 days for nurse visit.            2. As listed below, treatment provided irrigation, mechanical cleansing, and dressings to promote autolytic debridement.             Cleansed with: soap and water and vashe soak 5 minutes    Protected skin with:   triad    Dressings Applied to wound: Hydrofera blue ready transfer, ABD, Secured with roll gauze and tape. , and triad    Compression Applied to the right le-Layer Compression  Compression Applied to the left le-Layer Compression    4-Layer: LAYER 1: ORTHOPAEDIC WOOL The bandage was applied without tension in aloose spiral from base of toes to knee joint with 50% overlap.LAYER 2: LIGHT SUPPORT BANDAGE Then the next layer bandage was applied in spiral toe to knee with 50% overlap with 50% overlap.LAYER 3: LIGHT COMPRESSION BANDAGE Then the elastic conformable. compression bandage was applied at mid stretch (50%) in a figure of eight from toe to knee, with a 50% overlap.  LAYER 4: COHESIVE EXTENSIBLE BANDAGE Finally the lightweight, elastic, cohesive bandage was applied at mid stretch (50%) in a spiral with a 50 % overlap from toe to knee.      Offloading used: None    Trial Products: No    Provider notified regarding concerns: No did have staff review photos.    Treatment Changes: No    Tolerated Dressing Change:  Yes    Taught Regarding: follow up appointment(s), wound cares, compression, layered compression wraps - maximum wear time of 7 days, elevation, compliance, and signs of infection    Educational Barriers: No barriers      Umair Baca RN

## 2024-07-03 ENCOUNTER — TELEPHONE (OUTPATIENT)
Dept: VASCULAR SURGERY | Facility: CLINIC | Age: 79
End: 2024-07-03
Payer: COMMERCIAL

## 2024-07-03 NOTE — TELEPHONE ENCOUNTER
Caller: Elizabeth    Provider: MD Lars Torres    Detailed reason for call: Patient states her dressings from 7/1/24 fell off and is asking to be able to come in to get them reapplied. She will be available all day so any time will work for her.     Best phone number to contact: [phone]    Best time to contact: any    Ok to leave a detailed message: Yes    Ok to speak to authorized person if needed: No      (Noted to patient if reason is related to wound or incision, to please send a photo via email or [employer].)

## 2024-07-03 NOTE — TELEPHONE ENCOUNTER
Reviewed with Morelia RN,  and she confirmed that the patient is okay to wait until Friday to be re-wrapped. Patient has dressing supplies at home and is to use as previously directed.    Spoke w/patient and advised of above. Patient verbalized understanding and will come in on 7/05/2024 for NV to be re-wrapped.

## 2024-07-05 ENCOUNTER — OFFICE VISIT (OUTPATIENT)
Dept: VASCULAR SURGERY | Facility: CLINIC | Age: 79
End: 2024-07-05
Attending: FAMILY MEDICINE
Payer: COMMERCIAL

## 2024-07-05 VITALS
OXYGEN SATURATION: 98 % | DIASTOLIC BLOOD PRESSURE: 78 MMHG | SYSTOLIC BLOOD PRESSURE: 134 MMHG | RESPIRATION RATE: 16 BRPM | HEART RATE: 71 BPM | TEMPERATURE: 98 F

## 2024-07-05 DIAGNOSIS — R60.0 LOCALIZED EDEMA: ICD-10-CM

## 2024-07-05 DIAGNOSIS — L97.912 ULCER OF RIGHT LOWER EXTREMITY WITH FAT LAYER EXPOSED (H): Primary | ICD-10-CM

## 2024-07-05 PROCEDURE — 29581 APPL MULTLAYER CMPRN SYS LEG: CPT | Mod: 50

## 2024-07-05 ASSESSMENT — PAIN SCALES - GENERAL: PAINLEVEL: MILD PAIN (2)

## 2024-07-05 NOTE — PROGRESS NOTES
[facility]  -  Nurse Visit                      Date of Service:  July 5, 2024     Requesting Provider: MD Lars Torres    Diagnosis:     ICD-10-CM    1. Ulcer of right lower extremity with fat layer exposed (H)  L97.912       2. Localized edema  R60.0           Chief Complaint: Elizabeth is being seen today at [facility] [place] for her wound(s) and swelling dressing change. Reports pain of 2.  Denies any fevers, chills, or generalized ill feeling.     Dressing on Arrival: hydrofera, dry, Pt is currently using 4 layer for compression and did not tolerate well. R wrap came off on Wed- was using tubigrip on this leg upon arrival. Upon removal of dressings small Serous drainage is noted.    New Wounds noted: No    Vital Signs: /78   Pulse 71   Temp 98  F (36.7  C)   Resp 16   SpO2 98%     Assessment:      General:  Patient presents to clinic in no apparent distress.   Psychiatric:  Alert and oriented x3.   Lower extremity:  edema is present.    Integumentary:  Skin is intact    Circumferential volume measures:      6/3/2024     2:00 PM 6/6/2024    11:00 AM 6/10/2024     9:00 AM 6/18/2024     1:00 PM 7/5/2024     9:00 AM   Circumferential Measures   Right just above MTP 23.5 23 23.5 25 26   Right Ankle 27 24 24 25 26   Right Widest Calf 53.5 57 57 57 63   Left - just above MTP 23.5 23 22.3 24 23   Left Ankle 24 22 24 25 24   Left Widest Calf 53 54 54.2 56 61       Wound info:  Wound Leg Other (comment);Ulceration (Active)       Wound (used by OP I only) 03/10/22 0900 Right lower;anterior leg ulceration, venous (Active)       Wound (used by OP I only) 01/23/23 1550 Right leg (Active)       Wound (used by OP I only) 06/01/23 1101 Right medial;lower leg (Active)       VASC Wound right lower leg (Active)   Pre Size Length 0.5 07/01/24 0900   Pre Size Width 1 07/01/24 0900   Pre Size Depth 0.1 07/01/24 0900   Pre Total Sq cm 0.5 07/01/24 0900   Description closed  07/05/24 0900   Product Used ABD Pad 07/01/24 0900       VASC Wound Rt lateral leg (Active)   Pre Size Length 13 07/05/24 0900   Pre Size Width 63 07/05/24 0900   Pre Size Depth 0.1 07/05/24 0900   Pre Total Sq cm 819 07/05/24 0900   Post Size Length 1.8 02/08/24 1136   Post Size Width 1.5 02/08/24 1136   Post Size Depth 0.1 02/08/24 1136   Post Total Sq cm 2.7 02/08/24 1136   Description superficial weeping 07/05/24 0900   Product Used ABD Pad 07/01/24 0900       VASC Wound Rt medial leg (Active)   Pre Size Length 3.8 07/05/24 0900   Pre Size Width 2 07/05/24 0900   Pre Size Depth 0.1 07/05/24 0900   Pre Total Sq cm 7.6 07/05/24 0900   Post Size Length 3.2 02/08/24 1136   Post Size Width 1.7 02/08/24 1136   Post Size Depth 0.1 02/08/24 1136   Post Total Sq cm 5.44 02/08/24 1136   Description weeping 07/01/24 0900   Product Used ABD Pad 07/01/24 0900       VASC Wound Left shin (Active)   Pre Size Length 2 07/05/24 0900   Pre Size Width 2.5 07/05/24 0900   Pre Size Depth 0.1 07/05/24 0900   Pre Total Sq cm 5 07/05/24 0900   Product Used ABD Pad 07/01/24 0900       VASC Wound left posterior leg (Active)   Pre Size Length 2 07/01/24 0900   Pre Size Width 2 07/01/24 0900   Pre Size Depth 0.1 07/01/24 0900   Pre Total Sq cm 4 07/01/24 0900   Description superficial weeping 07/05/24 0900       Incision/Surgical Site 03/21/22 Right;Lower Leg (Active)       Undermining is present.    The periwoundskin is maceration and peeling      Plan:         1. Patient will return in 3 days for nurse visit.            2. As listed below, treatment provided irrigation, mechanical cleansing, and dressings to promote autolytic debridement and included application of multi-layer compression therapy.             Cleansed with: soap and water and vashe    Protected skin with: Remedy skin repair lotion and triad    Dressings Applied to wound: Hydrofera blue ready transfer, ABD, and Secured with roll gauze and tape.     Compression Applied to  the right le-Layer Compression  Compression Applied to the left le-Layer Compression    4-Layer: LAYER 1: ORTHOPAEDIC WOOL The bandage was applied without tension in aloose spiral from base of toes to knee joint with 50% overlap.LAYER 2: LIGHT SUPPORT BANDAGE Then the next layer bandage was applied in spiral toe to knee with 50% overlap with 50% overlap.LAYER 3: LIGHT COMPRESSION BANDAGE Then the elastic conformable. compression bandage was applied at mid stretch (50%) in a figure of eight from toe to knee, with a 50% overlap.  LAYER 4: COHESIVE EXTENSIBLE BANDAGE Finally the lightweight, elastic, cohesive bandage was applied at mid stretch (50%) in a spiral with a 50 % overlap from toe to knee.    Offloading used: None    Trial Products: No    Provider notified regarding concerns: No    Treatment Changes: No    Tolerated Dressing Change:  Yes    Taught Regarding: follow up appointment(s), wound cares, and compression    Educational Barriers: No barriers      TALA MARTINEZ RN CWOCN, CFCN

## 2024-07-05 NOTE — PATIENT INSTRUCTIONS
"  Wound Care Instructions    TWICE PER WEEK IN THE CLINIC and as needed, Cleanse your right leg and left leg wound(s) with 5 minute vashe soak.    Pat Dry with non-sterile gauze    Primary Dressing: Apply hydrofera blue ready transfer into/onto the wounds    Secondary dressing: Cover with ABDs    Secure with non-sterile roll gauze (4\" x 75\" roll) and tape (1\" roll tape) as needed; avoid adhesive directly on the skin    Compression: 4 layer bilaterally    SEEK MEDICAL CARE IF:  You have an increase in swelling, pain, or redness around the wound.  You have an increase in the amount of pus coming from the wound.  There is a bad smell coming from the wound.  The wound appears to be worsening/enlarging  You have a fever greater than 101.5 F    It is recommended that you elevate your legs throughout the day: approx 2-3 times each day  Elevate them above the level of your heart for 30 min.   Ways to do this:   Lay on the couch or your bed and prop your legs up on pillows   Recline back as far as you can go in your recliner and prop your legs on pillows.     Doing these things will help reduce the edema in your legs.      We want to hear from you!  In the next few weeks, you should receive a call or email to complete a survey about your visit at [facility]. Please help us improve your appointment experience by letting us know how we did today. We strive to make your experience good and value any ways in which we could do better.      We value your input and suggestions.    Thank you for choosing the [facility]!      "

## 2024-07-08 ENCOUNTER — OFFICE VISIT (OUTPATIENT)
Dept: VASCULAR SURGERY | Facility: CLINIC | Age: 79
End: 2024-07-08
Attending: FAMILY MEDICINE
Payer: COMMERCIAL

## 2024-07-08 VITALS
TEMPERATURE: 98.2 F | OXYGEN SATURATION: 97 % | RESPIRATION RATE: 16 BRPM | SYSTOLIC BLOOD PRESSURE: 151 MMHG | HEART RATE: 72 BPM | DIASTOLIC BLOOD PRESSURE: 83 MMHG

## 2024-07-08 DIAGNOSIS — L97.912 ULCER OF RIGHT LOWER EXTREMITY WITH FAT LAYER EXPOSED (H): Primary | ICD-10-CM

## 2024-07-08 PROCEDURE — 97602 WOUND(S) CARE NON-SELECTIVE: CPT

## 2024-07-08 ASSESSMENT — PAIN SCALES - GENERAL: PAINLEVEL: MODERATE PAIN (4)

## 2024-07-08 NOTE — PROGRESS NOTES
[facility]  -  Nurse Visit                          Date of Service:  July 8, 2024     Requesting Provider: MD Lars Torres    Diagnosis:     ICD-10-CM    1. Ulcer of right lower extremity with fat layer exposed (H)  L97.912           Chief Complaint: Elizabeth is being seen today at [facility] [place] for her wound(s) dressing change. Reports pain of 4.  Denies any fevers, chills, or generalized ill feeling.     Dressing on Arrival: Hydrofera Blue Transfer, Pt is currently using 4 layer for compression and did tolerate well. Upon removal of dressings heavy serous with scant bright green drainage is noted.    New Wounds noted: No    Vital Signs: BP (!) 151/83   Pulse 72   Temp 98.2  F (36.8  C)   Resp 16   SpO2 97%     Assessment:      General:  Patient presents to clinic in no apparent distress.   Psychiatric:  Alert and oriented x3.   Lower extremity:  edema is present.    Integumentary:  Skin is WNL    Circumferential volume measures:      6/3/2024     2:00 PM 6/6/2024    11:00 AM 6/10/2024     9:00 AM 6/18/2024     1:00 PM 7/5/2024     9:00 AM   Circumferential Measures   Right just above MTP 23.5 23 23.5 25 26   Right Ankle 27 24 24 25 26   Right Widest Calf 53.5 57 57 57 63   Left - just above MTP 23.5 23 22.3 24 23   Left Ankle 24 22 24 25 24   Left Widest Calf 53 54 54.2 56 61       Wound info:  Wound Leg Other (comment);Ulceration (Active)       Wound (used by [facility] only) 03/10/22 0900 Right lower;anterior leg ulceration, venous (Active)       Wound (used by OP I only) 01/23/23 1550 Right leg (Active)       [facility] 1101 Right medial;lower leg (Active)       VASC Wound right lower leg (Active)   Pre Size Length 0.5 07/01/24 0900   Pre Size Width 1 07/01/24 0900   Pre Size Depth 0.1 07/01/24 0900   Pre Total Sq cm 0.5 07/01/24 0900   Description closed 07/08/24 1100   Product Used ABD Pad 07/01/24 0900       VASC Wound Rt lateral leg  (Active)   Pre Size Length 17 24 1100   Pre Size Width 58 24 1100   Pre Size Depth 0.1 24 1100   Pre Total Sq cm 986 24 1100   Post Size Length 1.8 24 1136   Post Size Width 1.5 24 1136   Post Size Depth 0.1 24 1136   Post Total Sq cm 2.7 24 1136   Description superficial weeping 24 0900   Product Used ABD Pad 24 0900       VASC Wound Rt medial leg (Active)   Pre Size Length 3.07 24 1100   Pre Size Width 2 24 1100   Pre Size Depth 0.1 24 1100   Pre Total Sq cm 7.4 24 1100   Post Size Length 3.2 24 1136   Post Size Width 1.7 24 1136   Post Size Depth 0.1 24 1136   Post Total Sq cm 5.44 24 1136   Description weeping 24 0900   Product Used ABD Pad 24 0900       VASC Wound Left shin (Active)   Pre Size Length 2.3 24 1100   Pre Size Width 2.2 24 1100   Pre Size Depth 0.1 24 1100   Pre Total Sq cm 5.06 24 1100   Product Used ABD Pad 24 0900       VASC Wound left posterior leg (Active)   Pre Size Length 2 24 0900   Pre Size Width 2 24 0900   Pre Size Depth 0.1 24 0900   Pre Total Sq cm 4 24 0900   Description superficial weeping 24 1100       Incision/Surgical Site 22 Right;Lower Leg (Active)       Undermining is not present.    The periwound skin is  peeling      Plan:         1. Patient will return in 3 days for nurse visit.            2. As listed below, treatment provided irrigation, mechanical cleansing, and dressings to promote autolytic debridement and included application of multi-layer compression therapy.             Cleansed with: soap and water and Vashe    Protected skin with:  Triad    Dressings Applied to wound: Hydrofera blue ready transfer, ABD, and Secured with roll gauze and tape.     Compression Applied to the right le-Layer Compression  Compression Applied to the left le-Layer Compression    4-Layer: LAYER 1:  ORTHOPAEDIC WOOL The bandage was applied without tension in aloose spiral from base of toes to knee joint with 50% overlap.LAYER 2: LIGHT SUPPORT BANDAGE Then the next layer bandage was applied in spiral toe to knee with 50% overlap with 50% overlap.LAYER 3: LIGHT COMPRESSION BANDAGE Then the elastic conformable. compression bandage was applied at mid stretch (50%) in a figure of eight from toe to knee, with a 50% overlap.  LAYER 4: COHESIVE EXTENSIBLE BANDAGE Finally the lightweight, elastic, cohesive bandage was applied at mid stretch (50%) in a spiral with a 50 % overlap from toe to knee.      Offloading used:  cane    Trial Products: No    Provider notified regarding concerns: Yes: odor and slight green on prior ABD pad; per photo review, area is improving so no change to treatment.    Treatment Changes: No    Tolerated Dressing Change:  Yes    Taught Regarding: follow up appointment(s), wound cares, wound care supplies, and elevation    Educational Barriers: No barriers      Kimmy Valle RN, CWOCN

## 2024-07-08 NOTE — PATIENT INSTRUCTIONS
"  Wound Care Instructions    TWICE PER WEEK IN THE CLINIC and as needed, Cleanse your right leg and left leg wound(s) with 5 minute vashe soak.    Pat Dry with non-sterile gauze    Primary Dressing: Apply hydrofera blue ready transfer into/onto the wounds    Secondary dressing: Cover with ABDs    Secure with non-sterile roll gauze (4\" x 75\" roll) and tape (1\" roll tape) as needed; avoid adhesive directly on the skin    Compression: 4 layer bilaterally    SEEK MEDICAL CARE IF:  You have an increase in swelling, pain, or redness around the wound.  You have an increase in the amount of pus coming from the wound.  There is a bad smell coming from the wound.  The wound appears to be worsening/enlarging  You have a fever greater than 101.5 F    It is recommended that you elevate your legs throughout the day: approx 2-3 times each day  Elevate them above the level of your heart for 30 min.   Ways to do this:   Lay on the couch or your bed and prop your legs up on pillows   Recline back as far as you can go in your recliner and prop your legs on pillows.     Doing these things will help reduce the edema in your legs.      We want to hear from you!  In the next few weeks, you should receive a call or email to complete a survey about your visit at [facility]. Please help us improve your appointment experience by letting us know how we did today. We strive to make your experience good and value any ways in which we could do better.      We value your input and suggestions.    Thank you for choosing the [facility]!    "

## 2024-07-11 ENCOUNTER — OFFICE VISIT (OUTPATIENT)
Dept: VASCULAR SURGERY | Facility: CLINIC | Age: 79
End: 2024-07-11
Attending: FAMILY MEDICINE
Payer: COMMERCIAL

## 2024-07-11 VITALS
RESPIRATION RATE: 16 BRPM | TEMPERATURE: 97.9 F | DIASTOLIC BLOOD PRESSURE: 80 MMHG | WEIGHT: 261 LBS | SYSTOLIC BLOOD PRESSURE: 136 MMHG | HEART RATE: 80 BPM | BODY MASS INDEX: 50.13 KG/M2

## 2024-07-11 DIAGNOSIS — L97.912 ULCER OF RIGHT LOWER EXTREMITY WITH FAT LAYER EXPOSED (H): Primary | ICD-10-CM

## 2024-07-11 DIAGNOSIS — R60.0 LOCALIZED EDEMA: ICD-10-CM

## 2024-07-11 PROCEDURE — 29581 APPL MULTLAYER CMPRN SYS LEG: CPT

## 2024-07-11 RX ORDER — ACETIC ACID 0.25 G/100ML
IRRIGANT IRRIGATION DAILY
Qty: 1000 ML | Refills: 3 | Status: SHIPPED | OUTPATIENT
Start: 2024-07-11 | End: 2024-08-10

## 2024-07-11 NOTE — PROGRESS NOTES
[facility]  -  Nurse Visit        Right LE      Left LE    Date of Service:  July 11, 2024     Requesting Provider: MD Lars Torres    Diagnosis:     ICD-10-CM    1. Ulcer of right lower extremity with fat layer exposed (H)  L97.912 acetic acid 0.25 % irrigation      2. Localized edema  R60.0           Chief Complaint: Elizabeth is being seen today at [facility] for her wound(s) dressing change. Reports pain of 4 in right ankle mainly.  Denies any fevers, chills, or generalized ill feeling.     Dressing on Arrival: hydrofera blue, abd and rolled gauze, Pt is currently using 4 layer for compression and did tolerate well. Upon removal of dressings copious serous/green with odor drainage is noted on right, all bandages are saturated. Moderate serosanguinous on left.      Pt states she thinks drainage and pain in right LE have really been worse over last 4 days or so    New Wounds noted: No    Vital Signs: /80   Pulse 80   Temp 97.9  F (36.6  C)   Resp 16   Wt 261 lb (118.4 kg)   BMI 50.13 kg/m      Assessment:      General:  Patient presents to clinic in no apparent distress.   Psychiatric:  Alert and oriented x3.   Lower extremity:  edema is present.    Integumentary:  Skin is weeping on right- this is a large area from dorsal foot to knee over most of lower leg. On left leg is dry    Circumferential volume measures:      6/6/2024    11:00 AM 6/10/2024     9:00 AM 6/18/2024     1:00 PM 7/5/2024     9:00 AM 7/11/2024    11:00 AM   Circumferential Measures   Right just above MTP 23 23.5 25 26 23.5   Right Ankle 24 24 25 26 22.5   Right Widest Calf 57 57 57 63 58   Left - just above MTP 23 22.3 24 23 26   Left Ankle 22 24 25 24 23.2   Left Widest Calf 54 54.2 56 61 58       Wound info:  Wound Leg Other (comment);Ulceration (Active)       Wound (used by OP [facility] only) 03/10/22 0900 Right lower;anterior leg ulceration, venous (Active)       Wound (used by [facility]  only) 01/23/23 1550 Right leg (Active)       Wound (used by [facility] only) 06/01/23 1101 Right medial;lower leg (Active)       VASC Wound right lower leg (Active)   Pre Size Length 0.5 07/01/24 0900   Pre Size Width 1 07/01/24 0900   Pre Size Depth 0.1 07/01/24 0900   Pre Total Sq cm 0.5 07/01/24 0900   Description closed 07/08/24 1100   Product Used ABD Pad 07/01/24 0900       VASC Wound Rt lateral leg (Active)   Pre Size Length 17 07/08/24 1100   Pre Size Width 58 07/08/24 1100   Pre Size Depth 0.1 07/08/24 1100   Pre Total Sq cm 986 07/08/24 1100   Post Size Length 1.8 02/08/24 1136   Post Size Width 1.5 02/08/24 1136   Post Size Depth 0.1 02/08/24 1136   Post Total Sq cm 2.7 02/08/24 1136   Description superficial weeping 07/05/24 0900   Product Used ABD Pad 07/01/24 0900       VASC Wound Rt medial leg (Active)   Pre Size Length 3.7 07/11/24 1100   Pre Size Width 2.2 07/11/24 1100   Pre Size Depth 0 07/11/24 1100   Pre Total Sq cm 7.4 07/08/24 1100   Post Size Length 3.2 02/08/24 1136   Post Size Width 1.7 02/08/24 1136   Post Size Depth 0.1 02/08/24 1136   Post Total Sq cm 5.44 02/08/24 1136   Description weeping 07/01/24 0900   Product Used ABD Pad 07/01/24 0900       VASC Wound Left shin (Active)   Pre Size Length 1.5 07/11/24 1100   Pre Size Width 1.1 07/11/24 1100   Pre Size Depth 0 07/11/24 1100   Pre Total Sq cm 5.06 07/08/24 1100   Product Used ABD Pad 07/01/24 0900       VASC Wound left posterior leg (Active)   Pre Size Length 2 07/01/24 0900   Pre Size Width 2 07/01/24 0900   Pre Size Depth 0.1 07/01/24 0900   Pre Total Sq cm 4 07/01/24 0900   Description superficial weeping 07/08/24 1100       Incision/Surgical Site 03/21/22 Right;Lower Leg (Active)       Undermining is not present.    The periwoundskin is erythema, maceration, and weeping right, intact left      Plan:         1. Patient will return in 4 days for nurse visit.            2. As listed below, treatment provided irrigation, mechanical  cleansing, and dressings to promote autolytic debridement and included application of multi-layer compression therapy.             Cleansed with: soap and water and vashe    Protected skin with:  triad right, remedy left    Dressings Applied to wound:  Left- hydrofera blue transfer and abd pad, rolled gauze. Right- hydrofera blue transfer on anterior wound, triad to periwound and weeping skin then strips of zinc viscopaste to weeping skin followed by abd pad (2 5x9, 2 8x10, then rolled gauze    Compression Applied to the right leg: Tubular compression- size F ankle, G calf  Compression Applied to the left le-Layer Compression    4-Layer: LAYER 1: ORTHOPAEDIC WOOL The bandage was applied without tension in aloose spiral from base of toes to knee joint with 50% overlap.LAYER 2: LIGHT SUPPORT BANDAGE Then the next layer bandage was applied in spiral toe to knee with 50% overlap with 50% overlap.LAYER 3: LIGHT COMPRESSION BANDAGE Then the elastic conformable. compression bandage was applied at mid stretch (50%) in a figure of eight from toe to knee, with a 50% overlap.  LAYER 4: COHESIVE EXTENSIBLE BANDAGE Finally the lightweight, elastic, cohesive bandage was applied at mid stretch (50%) in a spiral with a 50 % overlap from toe to knee.          Offloading used: None    Trial Products: No    Provider notified regarding concerns: Yes: copious green tinged odorous drainage right with denudement/weeping    Treatment Changes: Yes: see nursing orders    Tolerated Dressing Change:  Yes    Taught Regarding: follow up appointment(s), wound cares, elevation, and signs of infection    Educational Barriers: No barriers      Martha Yi RN, CWOCN

## 2024-07-11 NOTE — PATIENT INSTRUCTIONS
"  Wound Care Instructions    Right leg: Daily care through Monday then return to clinic and we will reassess    1.) Wash leg/skin with diluted Hibiclenz soap and water. Do not scrub, just rub and remove the zinc that will come away easily and if some remains that is ok.    2.) Wet gauze with acetic acid solution and lay over all open/weepy areas for 10 minutes then remove and do not rinse.    3.) Cover wound with hydrofera blue transfer    4.) Apply triad paste the strips of viscopaste to the weeping areas    5.) Cover with abd pads, then rolled gauze    6.) Apply tubular support over bandages for compression, (more narrow size F on foot and ankle, larger size G on calf)    Leave compression wrap on left leg, cares as below continue twice weekly for this leg:    Cleanse your right leg and left leg wound(s) with 5 minute vashe soak.    Pat Dry with non-sterile gauze    Primary Dressing: Apply hydrofera blue ready transfer into/onto the wounds    Secondary dressing: Cover with ABDs    Secure with non-sterile roll gauze (4\" x 75\" roll) and tape (1\" roll tape) as needed; avoid adhesive directly on the skin    Compression: 4 layer bilaterally    SEEK MEDICAL CARE IF:  You have an increase in swelling, pain, or redness around the wound.  You have an increase in the amount of pus coming from the wound.  There is a bad smell coming from the wound.  The wound appears to be worsening/enlarging  You have a fever greater than 101.5 F    It is recommended that you elevate your legs throughout the day: approx 2-3 times each day  Elevate them above the level of your heart for 30 min.   Ways to do this:   Lay on the couch or your bed and prop your legs up on pillows   Recline back as far as you can go in your recliner and prop your legs on pillows.     Doing these things will help reduce the edema in your legs.      We want to hear from you!  In the next few weeks, you should receive a call or email to complete a survey about your " visit at [facility]. Please help us improve your appointment experience by letting us know how we did today. We strive to make your experience good and value any ways in which we could do better.      We value your input and suggestions.    Thank you for choosing the [facility]!

## 2024-07-15 ENCOUNTER — OFFICE VISIT (OUTPATIENT)
Dept: VASCULAR SURGERY | Facility: CLINIC | Age: 79
DRG: 872 | End: 2024-07-15
Attending: FAMILY MEDICINE
Payer: COMMERCIAL

## 2024-07-15 VITALS
HEART RATE: 76 BPM | TEMPERATURE: 98.4 F | RESPIRATION RATE: 12 BRPM | DIASTOLIC BLOOD PRESSURE: 70 MMHG | SYSTOLIC BLOOD PRESSURE: 120 MMHG

## 2024-07-15 DIAGNOSIS — L97.919 VARICOSE VEINS WITH ULCER, RIGHT (H): ICD-10-CM

## 2024-07-15 DIAGNOSIS — I87.303 VENOUS HYPERTENSION OF LOWER EXTREMITY, BILATERAL: ICD-10-CM

## 2024-07-15 DIAGNOSIS — I83.019 VARICOSE VEINS WITH ULCER, RIGHT (H): ICD-10-CM

## 2024-07-15 DIAGNOSIS — L97.912 ULCER OF RIGHT LOWER EXTREMITY WITH FAT LAYER EXPOSED (H): Primary | ICD-10-CM

## 2024-07-15 DIAGNOSIS — I89.0 ACQUIRED LYMPHEDEMA OF LEG: ICD-10-CM

## 2024-07-15 PROCEDURE — 97602 WOUND(S) CARE NON-SELECTIVE: CPT

## 2024-07-15 RX ORDER — SULFAMETHOXAZOLE/TRIMETHOPRIM 800-160 MG
1 TABLET ORAL 2 TIMES DAILY
Qty: 28 TABLET | Refills: 0 | Status: ON HOLD | OUTPATIENT
Start: 2024-07-15 | End: 2024-07-23

## 2024-07-15 ASSESSMENT — PAIN SCALES - GENERAL: PAINLEVEL: SEVERE PAIN (6)

## 2024-07-15 NOTE — PROGRESS NOTES
[facility]  -  Nurse Visit        Date of Service:  July 15, 2024     Requesting Provider: MD Lars Torres    Diagnosis:     ICD-10-CM    1. Ulcer of right lower extremity with fat layer exposed (H)  L97.912 sulfamethoxazole-trimethoprim (BACTRIM DS) 800-160 MG tablet      2. Acquired lymphedema of leg  I89.0       3. Varicose veins with ulcer, right (H)  I83.019     L97.919       4. Venous hypertension of lower extremity, bilateral  I87.303           Chief Complaint: Elizabeth is being seen today at [facility] for her wound(s) and swelling dressing change. Reports pain of 6/10 right leg red, painful weeping and swollen.  Denies any fevers, chills, or generalized ill feeling. Ambulates into clinic.Did cry when spoke about right leg being painful and needs to decrease work hours elevate and use compression pump. Dr Torres did speak with patient. Patient to start an antibiotic and if  worsens or temperature go to Emergency.    Dressing on Arrival: left leg 4 layer did slip down about 3 inches hydrofera,abd,right leg red weeping and painful, viscopaste,hydrofera blue,abd tubular size G, Pt is currently using Tubular right leg/ left leg 4 layer for compression and did tolerate well. Upon removal of dressings moderate Serosanguinous no longer has green drainage.drainage is noted.    New Wounds noted: No    Vital Signs: /70   Pulse 76   Temp 98.4  F (36.9  C)   Resp 12     Assessment:      General:  Patient presents to clinic in no apparent distress.   Psychiatric:  Alert and oriented x3.   Lower extremity:  edema is present.    Integumentary:  Skin is red and weeping right leg.    Circumferential volume measures:      6/10/2024     9:00 AM 6/18/2024     1:00 PM 7/5/2024     9:00 AM 7/11/2024    11:00 AM 7/15/2024    11:00 AM   Circumferential Measures   Right just above MTP 23.5 25 26 23.5 26   Right Ankle 24 25 26 22.5 26    Right Widest Calf 57 57 63 58 58.5   Left - just above MTP 22.3 24 23 26 22   Left Ankle 24 25 24 23.2 24   Left Widest Calf 54.2 56 61 58 46       Wound info:  Wound Leg Other (comment);Ulceration (Active)       Wound (used by [facility] only) 03/10/22 0900 Right lower;anterior leg ulceration, venous (Active)       Wound (used by [facility] only) 01/23/23 1550 Right leg (Active)       Wound (used by [facility] only) 06/01/23 1101 Right medial;lower leg (Active)       VASC Wound right lower leg (Active)   Pre Size Length 0.5 07/01/24 0900   Pre Size Width 1 07/01/24 0900   Pre Size Depth 0.1 07/01/24 0900   Pre Total Sq cm 0.5 07/01/24 0900   Description closed 07/08/24 1100   Product Used ABD Pad 07/01/24 0900       VASC Wound Rt lateral leg (Active)   Pre Size Length 17 07/15/24 1100   Pre Size Width 58 07/15/24 1100   Pre Size Depth 0.1 07/15/24 1100   Pre Total Sq cm 986 07/15/24 1100   Post Size Length 1.8 02/08/24 1136   Post Size Width 1.5 02/08/24 1136   Post Size Depth 0.1 02/08/24 1136   Post Total Sq cm 2.7 02/08/24 1136   Description superfical weep/red 07/15/24 1100   Product Used ABD Pad 07/15/24 1100       VASC Wound Rt medial leg (Active)   Pre Size Length 3.5 07/15/24 1100   Pre Size Width 2 07/15/24 1100   Pre Size Depth 0.1 07/15/24 1100   Pre Total Sq cm 7 07/15/24 1100   Post Size Length 3.2 02/08/24 1136   Post Size Width 1.7 02/08/24 1136   Post Size Depth 0.1 02/08/24 1136   Post Total Sq cm 5.44 02/08/24 1136   Description weeping 07/15/24 1100   Product Used ABD Pad 07/15/24 1100       VASC Wound Left shin (Active)   Pre Size Length 2 07/15/24 1100   Pre Size Width 1.5 07/15/24 1100   Pre Size Depth 0.1 07/15/24 1100   Pre Total Sq cm 3 07/15/24 1100   Product Used ABD Pad 07/15/24 1100       VASC Wound left posterior leg (Active)   Pre Size Length 2 07/01/24 0900   Pre Size Width 2 07/01/24 0900   Pre Size Depth 0.1 07/01/24 0900   Pre Total Sq cm 4 07/01/24 0900   Description superfical weeping  07/15/24 1100   Product Used ABD Pad 07/15/24 1100       Incision/Surgical Site 22 Right;Lower Leg (Active)       Undermining is not present.    The periwoundskin is erythema, maceration, and right leg red and weeping .      Plan:         1. Patient will return in 3 days for evaluation.            2. As listed below, treatment provided irrigation, mechanical cleansing, and dressings to promote autolytic debridement.             Cleansed with: soap and water and vashe    Protected skin with: Remedy skin repair lotion and triad    Dressings Applied to wound: Hydrofera blue ready transfer, ABD, and Secured with roll gauze and tape.     Compression Applied to the right le-Layer Compression  Compression Applied to the left le-Layer Compression    4-Layer: LAYER 1: ORTHOPAEDIC WOOL The bandage was applied without tension in aloose spiral from base of toes to knee joint with 50% overlap.LAYER 2: LIGHT SUPPORT BANDAGE Then the next layer bandage was applied in spiral toe to knee with 50% overlap with 50% overlap.LAYER 3: LIGHT COMPRESSION BANDAGE Then the elastic conformable. compression bandage was applied at mid stretch (50%) in a figure of eight from toe to knee, with a 50% overlap.  LAYER 4: COHESIVE EXTENSIBLE BANDAGE Finally the lightweight, elastic, cohesive bandage was applied at mid stretch (50%) in a spiral with a 50 % overlap from toe to knee.    Offloading used: None    Trial Products: No    Provider notified regarding concerns: Yes: right leg red,weeping, swollen and more painful.    Treatment Changes: Yes: restart bilateral 4 layers, start an antibiotic.    Tolerated Dressing Change:  Yes    Taught Regarding: follow up appointment(s), wound cares, compression, layered compression wraps - maximum wear time of 7 days, elevation, compliance, and antibiotic,pump, decrease work hours.    Educational Barriers: No barriers      Umair Baca RN

## 2024-07-15 NOTE — PATIENT INSTRUCTIONS
"  Wound Care Instructions    TWICE PER WEEK IN THE CLINIC and as needed, Cleanse your right leg and left leg wound(s) with 5 minute vashe soak.    Pat Dry with non-sterile gauze    Primary Dressing: Apply hydrofera blue ready transfer into/onto the wounds    Secondary dressing: Cover with ABDs    Secure with non-sterile roll gauze (4\" x 75\" roll) and tape (1\" roll tape) as needed; avoid adhesive directly on the skin    Compression: 4 layer bilaterally    SEEK MEDICAL CARE IF:  You have an increase in swelling, pain, or redness around the wound.  You have an increase in the amount of pus coming from the wound.  There is a bad smell coming from the wound.  The wound appears to be worsening/enlarging  You have a fever greater than 101.5 F    It is recommended that you elevate your legs throughout the day: approx 2-3 times each day  Elevate them above the level of your heart for 30 min.   Ways to do this:   Lay on the couch or your bed and prop your legs up on pillows   Recline back as far as you can go in your recliner and prop your legs on pillows.     Doing these things will help reduce the edema in your legs.    Start antibiotic, watch tempeture and pain and drainage every day, Use compression pumps daily. Going to emergency if needed.      We want to hear from you!  In the next few weeks, you should receive a call or email to complete a survey about your visit at [facility]. Please help us improve your appointment experience by letting us know how we did today. We strive to make your experience good and value any ways in which we could do better.      We value your input and suggestions.    Thank you for choosing the [facility]!  "

## 2024-07-16 ENCOUNTER — APPOINTMENT (OUTPATIENT)
Dept: RADIOLOGY | Facility: HOSPITAL | Age: 79
DRG: 872 | End: 2024-07-16
Attending: STUDENT IN AN ORGANIZED HEALTH CARE EDUCATION/TRAINING PROGRAM
Payer: COMMERCIAL

## 2024-07-16 ENCOUNTER — HOSPITAL ENCOUNTER (INPATIENT)
Facility: HOSPITAL | Age: 79
LOS: 7 days | Discharge: HOME | DRG: 872 | End: 2024-07-23
Attending: STUDENT IN AN ORGANIZED HEALTH CARE EDUCATION/TRAINING PROGRAM | Admitting: STUDENT IN AN ORGANIZED HEALTH CARE EDUCATION/TRAINING PROGRAM
Payer: COMMERCIAL

## 2024-07-16 DIAGNOSIS — L97.912 ULCER OF RIGHT LOWER EXTREMITY WITH FAT LAYER EXPOSED (H): ICD-10-CM

## 2024-07-16 DIAGNOSIS — T14.8XXA PAIN ASSOCIATED WITH WOUND: ICD-10-CM

## 2024-07-16 DIAGNOSIS — K59.00 CONSTIPATION, UNSPECIFIED CONSTIPATION TYPE: ICD-10-CM

## 2024-07-16 DIAGNOSIS — I87.2 PERIPHERAL VENOUS INSUFFICIENCY: Primary | ICD-10-CM

## 2024-07-16 DIAGNOSIS — A41.9 SEPSIS, DUE TO UNSPECIFIED ORGANISM, UNSPECIFIED WHETHER ACUTE ORGAN DYSFUNCTION PRESENT (H): ICD-10-CM

## 2024-07-16 DIAGNOSIS — R52 PAIN ASSOCIATED WITH WOUND: ICD-10-CM

## 2024-07-16 DIAGNOSIS — L03.115 CELLULITIS OF RIGHT LEG: ICD-10-CM

## 2024-07-16 LAB
ALBUMIN SERPL BCG-MCNC: 3.9 G/DL (ref 3.5–5.2)
ALBUMIN UR-MCNC: NEGATIVE MG/DL
ALP SERPL-CCNC: 127 U/L (ref 40–150)
ALT SERPL W P-5'-P-CCNC: 21 U/L (ref 0–50)
ANION GAP SERPL CALCULATED.3IONS-SCNC: 12 MMOL/L (ref 7–15)
APPEARANCE UR: CLEAR
AST SERPL W P-5'-P-CCNC: 20 U/L (ref 0–45)
BASE EXCESS BLDV CALC-SCNC: 6.6 MMOL/L (ref -3–3)
BASOPHILS # BLD AUTO: 0 10E3/UL (ref 0–0.2)
BASOPHILS NFR BLD AUTO: 0 %
BILIRUB SERPL-MCNC: 0.3 MG/DL
BILIRUB UR QL STRIP: NEGATIVE
BUN SERPL-MCNC: 17.6 MG/DL (ref 8–23)
CALCIUM SERPL-MCNC: 9.1 MG/DL (ref 8.8–10.4)
CHLORIDE SERPL-SCNC: 101 MMOL/L (ref 98–107)
COLOR UR AUTO: COLORLESS
CREAT SERPL-MCNC: 1.08 MG/DL (ref 0.51–0.95)
EGFRCR SERPLBLD CKD-EPI 2021: 53 ML/MIN/1.73M2
EOSINOPHIL # BLD AUTO: 0 10E3/UL (ref 0–0.7)
EOSINOPHIL NFR BLD AUTO: 0 %
ERYTHROCYTE [DISTWIDTH] IN BLOOD BY AUTOMATED COUNT: 15.1 % (ref 10–15)
FLUAV RNA SPEC QL NAA+PROBE: NEGATIVE
FLUBV RNA RESP QL NAA+PROBE: NEGATIVE
GLUCOSE BLDC GLUCOMTR-MCNC: 117 MG/DL (ref 70–99)
GLUCOSE SERPL-MCNC: 86 MG/DL (ref 70–99)
GLUCOSE UR STRIP-MCNC: NEGATIVE MG/DL
HCO3 BLDV-SCNC: 31 MMOL/L (ref 21–28)
HCO3 SERPL-SCNC: 27 MMOL/L (ref 22–29)
HCT VFR BLD AUTO: 35.5 % (ref 35–47)
HGB BLD-MCNC: 11.1 G/DL (ref 11.7–15.7)
HGB UR QL STRIP: NEGATIVE
HOLD SPECIMEN: NORMAL
IMM GRANULOCYTES # BLD: 0.1 10E3/UL
IMM GRANULOCYTES NFR BLD: 1 %
IRON BINDING CAPACITY (ROCHE): 306 UG/DL (ref 240–430)
IRON SATN MFR SERPL: 7 % (ref 15–46)
IRON SERPL-MCNC: 20 UG/DL (ref 37–145)
KETONES UR STRIP-MCNC: NEGATIVE MG/DL
LACTATE SERPL-SCNC: 1.2 MMOL/L (ref 0.7–2)
LEUKOCYTE ESTERASE UR QL STRIP: NEGATIVE
LYMPHOCYTES # BLD AUTO: 0.3 10E3/UL (ref 0.8–5.3)
LYMPHOCYTES NFR BLD AUTO: 2 %
MAGNESIUM SERPL-MCNC: 1.7 MG/DL (ref 1.7–2.3)
MCH RBC QN AUTO: 27.3 PG (ref 26.5–33)
MCHC RBC AUTO-ENTMCNC: 31.3 G/DL (ref 31.5–36.5)
MCV RBC AUTO: 87 FL (ref 78–100)
MONOCYTES # BLD AUTO: 0.7 10E3/UL (ref 0–1.3)
MONOCYTES NFR BLD AUTO: 5 %
NEUTROPHILS # BLD AUTO: 14.4 10E3/UL (ref 1.6–8.3)
NEUTROPHILS NFR BLD AUTO: 92 %
NITRATE UR QL: NEGATIVE
NRBC # BLD AUTO: 0 10E3/UL
NRBC BLD AUTO-RTO: 0 /100
O2/TOTAL GAS SETTING VFR VENT: 21 %
OXYHGB MFR BLDV: 31 % (ref 70–75)
PCO2 BLDV: 51 MM HG (ref 40–50)
PH BLDV: 7.4 [PH] (ref 7.32–7.43)
PH UR STRIP: 5 [PH] (ref 5–7)
PHOSPHATE SERPL-MCNC: 2.4 MG/DL (ref 2.5–4.5)
PLATELET # BLD AUTO: 242 10E3/UL (ref 150–450)
PO2 BLDV: 21 MM HG (ref 25–47)
POTASSIUM SERPL-SCNC: 4.1 MMOL/L (ref 3.4–5.3)
PROCALCITONIN SERPL IA-MCNC: 0.41 NG/ML
PROT SERPL-MCNC: 7.2 G/DL (ref 6.4–8.3)
RBC # BLD AUTO: 4.06 10E6/UL (ref 3.8–5.2)
RBC URINE: 1 /HPF
RETICS # AUTO: 0.06 10E6/UL (ref 0.03–0.1)
RETICS/RBC NFR AUTO: 1.5 % (ref 0.5–2)
RSV RNA SPEC NAA+PROBE: NEGATIVE
SAO2 % BLDV: 31.1 % (ref 70–75)
SARS-COV-2 RNA RESP QL NAA+PROBE: NEGATIVE
SODIUM SERPL-SCNC: 140 MMOL/L (ref 135–145)
SP GR UR STRIP: 1.01 (ref 1–1.03)
SQUAMOUS EPITHELIAL: <1 /HPF
UROBILINOGEN UR STRIP-MCNC: <2 MG/DL
WBC # BLD AUTO: 15.6 10E3/UL (ref 4–11)
WBC URINE: <1 /HPF

## 2024-07-16 PROCEDURE — 84145 PROCALCITONIN (PCT): CPT | Performed by: STUDENT IN AN ORGANIZED HEALTH CARE EDUCATION/TRAINING PROGRAM

## 2024-07-16 PROCEDURE — 83550 IRON BINDING TEST: CPT | Performed by: STUDENT IN AN ORGANIZED HEALTH CARE EDUCATION/TRAINING PROGRAM

## 2024-07-16 PROCEDURE — 250N000013 HC RX MED GY IP 250 OP 250 PS 637: Performed by: STUDENT IN AN ORGANIZED HEALTH CARE EDUCATION/TRAINING PROGRAM

## 2024-07-16 PROCEDURE — 85025 COMPLETE CBC W/AUTO DIFF WBC: CPT | Performed by: STUDENT IN AN ORGANIZED HEALTH CARE EDUCATION/TRAINING PROGRAM

## 2024-07-16 PROCEDURE — 81001 URINALYSIS AUTO W/SCOPE: CPT | Performed by: STUDENT IN AN ORGANIZED HEALTH CARE EDUCATION/TRAINING PROGRAM

## 2024-07-16 PROCEDURE — 258N000003 HC RX IP 258 OP 636: Performed by: STUDENT IN AN ORGANIZED HEALTH CARE EDUCATION/TRAINING PROGRAM

## 2024-07-16 PROCEDURE — 87040 BLOOD CULTURE FOR BACTERIA: CPT | Performed by: STUDENT IN AN ORGANIZED HEALTH CARE EDUCATION/TRAINING PROGRAM

## 2024-07-16 PROCEDURE — 99223 1ST HOSP IP/OBS HIGH 75: CPT | Performed by: STUDENT IN AN ORGANIZED HEALTH CARE EDUCATION/TRAINING PROGRAM

## 2024-07-16 PROCEDURE — 82607 VITAMIN B-12: CPT | Performed by: STUDENT IN AN ORGANIZED HEALTH CARE EDUCATION/TRAINING PROGRAM

## 2024-07-16 PROCEDURE — 87637 SARSCOV2&INF A&B&RSV AMP PRB: CPT | Performed by: STUDENT IN AN ORGANIZED HEALTH CARE EDUCATION/TRAINING PROGRAM

## 2024-07-16 PROCEDURE — 99285 EMERGENCY DEPT VISIT HI MDM: CPT

## 2024-07-16 PROCEDURE — 36415 COLL VENOUS BLD VENIPUNCTURE: CPT | Performed by: STUDENT IN AN ORGANIZED HEALTH CARE EDUCATION/TRAINING PROGRAM

## 2024-07-16 PROCEDURE — 250N000011 HC RX IP 250 OP 636: Performed by: STUDENT IN AN ORGANIZED HEALTH CARE EDUCATION/TRAINING PROGRAM

## 2024-07-16 PROCEDURE — 84100 ASSAY OF PHOSPHORUS: CPT | Performed by: STUDENT IN AN ORGANIZED HEALTH CARE EDUCATION/TRAINING PROGRAM

## 2024-07-16 PROCEDURE — 85045 AUTOMATED RETICULOCYTE COUNT: CPT | Performed by: STUDENT IN AN ORGANIZED HEALTH CARE EDUCATION/TRAINING PROGRAM

## 2024-07-16 PROCEDURE — 71045 X-RAY EXAM CHEST 1 VIEW: CPT

## 2024-07-16 PROCEDURE — 82728 ASSAY OF FERRITIN: CPT | Performed by: STUDENT IN AN ORGANIZED HEALTH CARE EDUCATION/TRAINING PROGRAM

## 2024-07-16 PROCEDURE — 87086 URINE CULTURE/COLONY COUNT: CPT | Performed by: STUDENT IN AN ORGANIZED HEALTH CARE EDUCATION/TRAINING PROGRAM

## 2024-07-16 PROCEDURE — 83735 ASSAY OF MAGNESIUM: CPT | Performed by: STUDENT IN AN ORGANIZED HEALTH CARE EDUCATION/TRAINING PROGRAM

## 2024-07-16 PROCEDURE — 82805 BLOOD GASES W/O2 SATURATION: CPT | Performed by: STUDENT IN AN ORGANIZED HEALTH CARE EDUCATION/TRAINING PROGRAM

## 2024-07-16 PROCEDURE — 250N000009 HC RX 250: Performed by: STUDENT IN AN ORGANIZED HEALTH CARE EDUCATION/TRAINING PROGRAM

## 2024-07-16 PROCEDURE — 80053 COMPREHEN METABOLIC PANEL: CPT | Performed by: STUDENT IN AN ORGANIZED HEALTH CARE EDUCATION/TRAINING PROGRAM

## 2024-07-16 PROCEDURE — 83605 ASSAY OF LACTIC ACID: CPT | Performed by: STUDENT IN AN ORGANIZED HEALTH CARE EDUCATION/TRAINING PROGRAM

## 2024-07-16 PROCEDURE — 120N000001 HC R&B MED SURG/OB

## 2024-07-16 RX ORDER — ROPINIROLE 0.25 MG/1
0.5 TABLET, FILM COATED ORAL EVERY MORNING
Status: DISCONTINUED | OUTPATIENT
Start: 2024-07-17 | End: 2024-07-23 | Stop reason: HOSPADM

## 2024-07-16 RX ORDER — GABAPENTIN 100 MG/1
100 CAPSULE ORAL 3 TIMES DAILY
Status: DISCONTINUED | OUTPATIENT
Start: 2024-07-16 | End: 2024-07-23 | Stop reason: HOSPADM

## 2024-07-16 RX ORDER — ACETAMINOPHEN 325 MG/1
975 TABLET ORAL ONCE
Status: COMPLETED | OUTPATIENT
Start: 2024-07-16 | End: 2024-07-16

## 2024-07-16 RX ORDER — AMOXICILLIN 250 MG
1 CAPSULE ORAL 2 TIMES DAILY PRN
Status: DISCONTINUED | OUTPATIENT
Start: 2024-07-16 | End: 2024-07-23 | Stop reason: HOSPADM

## 2024-07-16 RX ORDER — ASPIRIN 81 MG/1
81 TABLET, CHEWABLE ORAL EVERY MORNING
Status: DISCONTINUED | OUTPATIENT
Start: 2024-07-17 | End: 2024-07-23 | Stop reason: HOSPADM

## 2024-07-16 RX ORDER — ACETAMINOPHEN 325 MG/1
975 TABLET ORAL EVERY 6 HOURS PRN
Status: DISCONTINUED | OUTPATIENT
Start: 2024-07-16 | End: 2024-07-23 | Stop reason: HOSPADM

## 2024-07-16 RX ORDER — FUROSEMIDE 20 MG
20 TABLET ORAL 2 TIMES DAILY
COMMUNITY

## 2024-07-16 RX ORDER — GABAPENTIN 100 MG/1
100 CAPSULE ORAL 3 TIMES DAILY
COMMUNITY

## 2024-07-16 RX ORDER — PANTOPRAZOLE SODIUM 40 MG/1
40 TABLET, DELAYED RELEASE ORAL EVERY MORNING
COMMUNITY

## 2024-07-16 RX ORDER — CEFAZOLIN SODIUM 1 G/50ML
2500 SOLUTION INTRAVENOUS ONCE
Status: COMPLETED | OUTPATIENT
Start: 2024-07-16 | End: 2024-07-16

## 2024-07-16 RX ORDER — FUROSEMIDE 20 MG
20 TABLET ORAL 2 TIMES DAILY
Status: DISCONTINUED | OUTPATIENT
Start: 2024-07-16 | End: 2024-07-23 | Stop reason: HOSPADM

## 2024-07-16 RX ORDER — ONDANSETRON 2 MG/ML
4 INJECTION INTRAMUSCULAR; INTRAVENOUS EVERY 6 HOURS PRN
Status: DISCONTINUED | OUTPATIENT
Start: 2024-07-16 | End: 2024-07-23 | Stop reason: HOSPADM

## 2024-07-16 RX ORDER — FERROUS SULFATE 325(65) MG
325 TABLET ORAL
COMMUNITY

## 2024-07-16 RX ORDER — VANCOMYCIN HYDROCHLORIDE 1 G/200ML
1000 INJECTION, SOLUTION INTRAVENOUS EVERY 24 HOURS
Status: DISCONTINUED | OUTPATIENT
Start: 2024-07-17 | End: 2024-07-18

## 2024-07-16 RX ORDER — ROPINIROLE 1 MG/1
1 TABLET, FILM COATED ORAL EVERY EVENING
Status: DISCONTINUED | OUTPATIENT
Start: 2024-07-16 | End: 2024-07-23 | Stop reason: HOSPADM

## 2024-07-16 RX ORDER — PANTOPRAZOLE SODIUM 40 MG/1
40 TABLET, DELAYED RELEASE ORAL EVERY MORNING
Status: DISCONTINUED | OUTPATIENT
Start: 2024-07-17 | End: 2024-07-23 | Stop reason: HOSPADM

## 2024-07-16 RX ORDER — SPIRONOLACTONE 25 MG/1
25 TABLET ORAL EVERY MORNING
Status: DISCONTINUED | OUTPATIENT
Start: 2024-07-17 | End: 2024-07-23 | Stop reason: HOSPADM

## 2024-07-16 RX ORDER — MULTIVIT WITH MINERALS/LUTEIN
250 TABLET ORAL 2 TIMES DAILY
Status: DISCONTINUED | OUTPATIENT
Start: 2024-07-16 | End: 2024-07-23 | Stop reason: HOSPADM

## 2024-07-16 RX ORDER — AMOXICILLIN 250 MG
2 CAPSULE ORAL 2 TIMES DAILY PRN
Status: DISCONTINUED | OUTPATIENT
Start: 2024-07-16 | End: 2024-07-23 | Stop reason: HOSPADM

## 2024-07-16 RX ORDER — ONDANSETRON 4 MG/1
4 TABLET, ORALLY DISINTEGRATING ORAL EVERY 6 HOURS PRN
Status: DISCONTINUED | OUTPATIENT
Start: 2024-07-16 | End: 2024-07-23 | Stop reason: HOSPADM

## 2024-07-16 RX ORDER — CITALOPRAM HYDROBROMIDE 40 MG/1
40 TABLET ORAL
Status: DISCONTINUED | OUTPATIENT
Start: 2024-07-17 | End: 2024-07-23 | Stop reason: HOSPADM

## 2024-07-16 RX ORDER — ASCORBIC ACID 250 MG
250 TABLET,CHEWABLE ORAL DAILY
COMMUNITY

## 2024-07-16 RX ORDER — NITROGLYCERIN 0.4 MG/1
0.4 TABLET SUBLINGUAL EVERY 5 MIN PRN
Status: DISCONTINUED | OUTPATIENT
Start: 2024-07-16 | End: 2024-07-23 | Stop reason: HOSPADM

## 2024-07-16 RX ORDER — SIMVASTATIN 40 MG
40 TABLET ORAL AT BEDTIME
Status: DISCONTINUED | OUTPATIENT
Start: 2024-07-16 | End: 2024-07-23 | Stop reason: HOSPADM

## 2024-07-16 RX ORDER — MAGNESIUM SULFATE 4 G/50ML
4 INJECTION INTRAVENOUS ONCE
Status: COMPLETED | OUTPATIENT
Start: 2024-07-16 | End: 2024-07-16

## 2024-07-16 RX ORDER — SODIUM CHLORIDE 9 MG/ML
INJECTION, SOLUTION INTRAVENOUS CONTINUOUS
Status: DISCONTINUED | OUTPATIENT
Start: 2024-07-16 | End: 2024-07-17

## 2024-07-16 RX ORDER — ROPINIROLE 1 MG/1
0.5 TABLET, FILM COATED ORAL EVERY MORNING
COMMUNITY

## 2024-07-16 RX ORDER — FERROUS SULFATE 325(65) MG
325 TABLET ORAL
Status: DISCONTINUED | OUTPATIENT
Start: 2024-07-17 | End: 2024-07-19

## 2024-07-16 RX ORDER — LIDOCAINE 40 MG/G
CREAM TOPICAL
Status: DISCONTINUED | OUTPATIENT
Start: 2024-07-16 | End: 2024-07-23 | Stop reason: HOSPADM

## 2024-07-16 RX ORDER — BUPROPION HYDROCHLORIDE 150 MG/1
150 TABLET, EXTENDED RELEASE ORAL EVERY MORNING
Status: DISCONTINUED | OUTPATIENT
Start: 2024-07-17 | End: 2024-07-23 | Stop reason: HOSPADM

## 2024-07-16 RX ORDER — CALCIUM CARBONATE 500 MG/1
1000 TABLET, CHEWABLE ORAL 4 TIMES DAILY PRN
Status: DISCONTINUED | OUTPATIENT
Start: 2024-07-16 | End: 2024-07-23 | Stop reason: HOSPADM

## 2024-07-16 RX ADMIN — SIMVASTATIN 40 MG: 40 TABLET, FILM COATED ORAL at 22:04

## 2024-07-16 RX ADMIN — Medication 250 MG: at 20:26

## 2024-07-16 RX ADMIN — SODIUM PHOSPHATE, MONOBASIC, MONOHYDRATE AND SODIUM PHOSPHATE, DIBASIC, ANHYDROUS 9 MMOL: 142; 276 INJECTION, SOLUTION INTRAVENOUS at 21:54

## 2024-07-16 RX ADMIN — SODIUM CHLORIDE 2500 MG: 9 INJECTION, SOLUTION INTRAVENOUS at 16:17

## 2024-07-16 RX ADMIN — GABAPENTIN 100 MG: 100 CAPSULE ORAL at 20:26

## 2024-07-16 RX ADMIN — ACETAMINOPHEN 975 MG: 325 TABLET ORAL at 20:26

## 2024-07-16 RX ADMIN — SODIUM CHLORIDE: 9 INJECTION, SOLUTION INTRAVENOUS at 19:07

## 2024-07-16 RX ADMIN — MAGNESIUM SULFATE HEPTAHYDRATE 4 G: 80 INJECTION, SOLUTION INTRAVENOUS at 19:47

## 2024-07-16 RX ADMIN — SODIUM CHLORIDE 1000 ML: 9 INJECTION, SOLUTION INTRAVENOUS at 14:05

## 2024-07-16 RX ADMIN — ACETAMINOPHEN 975 MG: 325 TABLET ORAL at 14:35

## 2024-07-16 RX ADMIN — ROPINIROLE HYDROCHLORIDE 1 MG: 1 TABLET, FILM COATED ORAL at 20:26

## 2024-07-16 ASSESSMENT — ACTIVITIES OF DAILY LIVING (ADL)
ADLS_ACUITY_SCORE: 38
ADLS_ACUITY_SCORE: 31
ADLS_ACUITY_SCORE: 31
ADLS_ACUITY_SCORE: 27
DEPENDENT_IADLS:: INDEPENDENT
ADLS_ACUITY_SCORE: 38
ADLS_ACUITY_SCORE: 31
ADLS_ACUITY_SCORE: 31
ADLS_ACUITY_SCORE: 40
ADLS_ACUITY_SCORE: 38

## 2024-07-16 NOTE — PHARMACY-VANCOMYCIN DOSING SERVICE
Pharmacy Vancomycin Initial Note  Date of Service 2024  Patient's  1945  78 year old, female    Indication: Skin and Soft Tissue Infection    Current estimated CrCl = Estimated Creatinine Clearance: 52.3 mL/min (A) (based on SCr of 1.08 mg/dL (H)).    Creatinine for last 3 days  2024:  1:58 PM Creatinine 1.08 mg/dL    Recent Vancomycin Level(s) for last 3 days  No results found for requested labs within last 3 days.      Vancomycin IV Administrations (past 72 hours)        No vancomycin orders with administrations in past 72 hours.                    Nephrotoxins and other renal medications (From now, onward)      Start     Dose/Rate Route Frequency Ordered Stop    24 1500  vancomycin (VANCOCIN) 2,500 mg in sodium chloride 0.9 % 500 mL intermittent infusion         2,500 mg  over 2 Hours Intravenous ONCE 24 1450              Contrast Orders - past 72 hours (72h ago, onward)      None            Plan:  Start vancomycin 2500 mg IV as one time ~ 20 mg/kg loading dose  Please reconsult pharmacy if vancomycin therapy is desired beyond this one time loading dose. Thank you.     Perry Moctezuma [facility]

## 2024-07-16 NOTE — ED PROVIDER NOTES
EMERGENCY DEPARTMENT ENCOUNTER       ED Course & Medical Decision Making     3:06 PM Spoke with Dr Gondal (hospitalist) regarding admission    Final Impression  78 year old female presents for evaluation of bodyaches, fever, nausea, lightheadedness and concerns for possible wound infection on her right lower extremity.  Patient is chronic lymphedema of bilateral lower extremities, that appears to be worse on the right, has a large ulcer on the right lower extremity that is followed closely by the wound clinic, had good photodocumentation and examination yesterday in the wound clinic, those notes have been reviewed.  They were concerned for worsening of her chronic ulcer on the right lower extremity thus was started on Bactrim.  Today patient spontaneously developed a fever while driving and began feeling very unwell.  Patient febrile to 102.5 upon arrival, though otherwise hemodynamically stable.  Fever treated with Tylenol.  White count mildly elevated at 15.6, though lactic acid normal at 1.2.  Urinalysis without signs of infection, COVID/flu/RSV negative.  No significant respiratory symptoms, thus suspect pneumonia much less likely, though patient has had bad pneumonia in the past during a prolonged hospitalization thus will obtain a chest x-ray.  Would favor possible sepsis secondary to this worsening leg ulceration/cellulitis.  Patient also reporting some diarrhea, thus will obtain stool studies as well given that she recently started antibiotics and is now having new diarrhea.  Started on vancomycin for presumed soft tissue infection of the right lower extremity with signs of sepsis.  Will admit to hospitalist service.    Prior to making a final disposition on this patient the results of patient's tests and other diagnostic studies were discussed with the patient. All questions were answered. Patient expressed understanding of the plan and was amenable to it.    Medical Decision  Making    History:  Supplemental history from:   External Record(s) reviewed as documented below;  7/15/2024, [facility] note, seen for a wound check, chronic ulcer of right lower extremity with underlying lymphedema, photos reviewed from note.    Work Up:  Chart documentation includes differential considered and any EKGs or imaging independently interpreted by provider, where specified.    External consultation:  Discussion of management with another provider: Hospitalist    Complicating factors:  Care impacted by chronic illness: Obesity, hyperlipidemia, melanoma, lymphedema, multiple sclerosis, hypertension  Care affected by social determinants of health: N/A    Disposition considerations: Admit.    Medications   sodium chloride (PF) 0.9% PF flush 3 mL (has no administration in time range)   sodium chloride (PF) 0.9% PF flush 3 mL (3 mLs Intracatheter Not Given 7/16/24 1437)   vancomycin (VANCOCIN) 2,500 mg in sodium chloride 0.9 % 500 mL intermittent infusion (has no administration in time range)   sodium chloride 0.9% BOLUS 1,000 mL (1,000 mLs Intravenous $New Bag 7/16/24 1405)   acetaminophen (TYLENOL) tablet 975 mg (975 mg Oral $Given 7/16/24 1435)       New Prescriptions    No medications on file     Modified Medications    No medications on file     Final Impression     1. Sepsis, due to unspecified organism, unspecified whether acute organ dysfunction present (H)    2. Cellulitis of right leg        Chief Complaint     Chief Complaint   Patient presents with    Generalized Body Aches    Wound Check     HPI     Elizabeth Kelley is a 78 year old female who presents for evaluation of body aches and a wound check.    Patient woke up feeling normal this morning.  She took her temperature and was afebrile at that time.  When she measured again at work that morning, she was febrile.  She also endorses nausea, mild dizziness, and dehydration.  She does have open wounds to both legs with  "lymphedema, these have been present for about 4 years.  She is being seen by vascular surgery often to get the wounds rechecked and redressed. She has dealt with multiple infections of these wounds. She also had some skin and lymph nodes on those legs removed.  She endorses a hard lump above her right knee.    She endorses a cough, however this seems to be more chronic than anything. She denies any urinary symptoms or vomiting. She does have a history of MS and multiple allergies.    I, Edna Parekh am serving as a scribe to document services personally performed by Dr. Mazin Gann MD, based on my observation and the provider's statements to me. I, Dr. Mazin Gann MD attest that Edna Parekh is acting in a scribe capacity, has observed my performance of the services and has documented them in accordance with my direction.    Physical Exam     BP (!) 153/70   Pulse 89   Temp (!) 102.5  F (39.2  C) (Oral)   Resp 20   Ht 1.549 m (5' 1\")   Wt 115.2 kg (254 lb)   SpO2 96%   BMI 47.99 kg/m    Constitutional: Awake, alert, in no acute distress.  Head: Normocephalic, atraumatic.  ENT: Mucous membranes moist.  Eyes: Conjunctiva normal.  Respiratory: Respirations even, unlabored, in no acute respiratory distress.  Cardiovascular: Regular rate and rhythm. Good peripheral perfusion.  GI: Abdomen soft, non-tender.  Musculoskeletal: Moves all 4 extremities equally.  Integument: Chronic lymphedema bilateral lower extremities, appears to be worse on the right, chronic wound dressings in place, just rewrapped by wound clinic yesterday with good photodocumentation, thus were not unwrapped today.  Neurologic: Alert & oriented x 3. Normal speech. Grossly normal motor and sensory function. No focal deficits noted.  Psychiatric: Normal mood    Labs & Imaging       Results for orders placed or performed during the hospital encounter of 07/16/24   Comprehensive metabolic panel   Result Value Ref Range    " Sodium 140 135 - 145 mmol/L    Potassium 4.1 3.4 - 5.3 mmol/L    Carbon Dioxide (CO2) 27 22 - 29 mmol/L    Anion Gap 12 7 - 15 mmol/L    Urea Nitrogen 17.6 8.0 - 23.0 mg/dL    Creatinine 1.08 (H) 0.51 - 0.95 mg/dL    GFR Estimate 53 (L) >60 mL/min/1.73m2    Calcium 9.1 8.8 - 10.4 mg/dL    Chloride 101 98 - 107 mmol/L    Glucose 86 70 - 99 mg/dL    Alkaline Phosphatase 127 40 - 150 U/L    AST 20 0 - 45 U/L    ALT 21 0 - 50 U/L    Protein Total 7.2 6.4 - 8.3 g/dL    Albumin 3.9 3.5 - 5.2 g/dL    Bilirubin Total 0.3 <=1.2 mg/dL   Lactic Acid Whole Blood with 1X Repeat in 2 HR when >2   Result Value Ref Range    Lactic Acid, Initial 1.2 0.7 - 2.0 mmol/L   Blood gas venous   Result Value Ref Range    pH Venous 7.40 7.32 - 7.43    pCO2 Venous 51 (H) 40 - 50 mm Hg    pO2 Venous 21 (L) 25 - 47 mm Hg    Bicarbonate Venous 31 (H) 21 - 28 mmol/L    Base Excess/Deficit Venous 6.6 (H) -3.0 - 3.0 mmol/L    FIO2 21     Oxyhemoglobin Venous 31 (L) 70 - 75 %    O2 Sat, Venous 31.1 (L) 70.0 - 75.0 %   Result Value Ref Range    Procalcitonin 0.41 <0.50 ng/mL   UA with Microscopic   Result Value Ref Range    Color Urine Colorless Colorless, Straw, Light Yellow, Yellow    Appearance Urine Clear Clear    Glucose Urine Negative Negative mg/dL    Bilirubin Urine Negative Negative    Ketones Urine Negative Negative mg/dL    Specific Gravity Urine 1.012 1.001 - 1.030    Blood Urine Negative Negative    pH Urine 5.0 5.0 - 7.0    Protein Albumin Urine Negative Negative mg/dL    Urobilinogen Urine <2.0 <2.0 mg/dL    Nitrite Urine Negative Negative    Leukocyte Esterase Urine Negative Negative    RBC Urine 1 <=2 /HPF    WBC Urine <1 <=5 /HPF    Squamous Epithelials Urine <1 <=1 /HPF   Symptomatic Influenza A/B, RSV, & SARS-CoV2 PCR (COVID-19) Nasopharyngeal    Specimen: Nasopharyngeal; Swab   Result Value Ref Range    Influenza A PCR Negative Negative    Influenza B PCR Negative Negative    RSV PCR Negative Negative    SARS CoV2 PCR Negative  Negative   CBC with platelets and differential   Result Value Ref Range    WBC Count 15.6 (H) 4.0 - 11.0 10e3/uL    RBC Count 4.06 3.80 - 5.20 10e6/uL    Hemoglobin 11.1 (L) 11.7 - 15.7 g/dL    Hematocrit 35.5 35.0 - 47.0 %    MCV 87 78 - 100 fL    MCH 27.3 26.5 - 33.0 pg    MCHC 31.3 (L) 31.5 - 36.5 g/dL    RDW 15.1 (H) 10.0 - 15.0 %    Platelet Count 242 150 - 450 10e3/uL    % Neutrophils 92 %    % Lymphocytes 2 %    % Monocytes 5 %    % Eosinophils 0 %    % Basophils 0 %    % Immature Granulocytes 1 %    NRBCs per 100 WBC 0 <1 /100    Absolute Neutrophils 14.4 (H) 1.6 - 8.3 10e3/uL    Absolute Lymphocytes 0.3 (L) 0.8 - 5.3 10e3/uL    Absolute Monocytes 0.7 0.0 - 1.3 10e3/uL    Absolute Eosinophils 0.0 0.0 - 0.7 10e3/uL    Absolute Basophils 0.0 0.0 - 0.2 10e3/uL    Absolute Immature Granulocytes 0.1 <=0.4 10e3/uL    Absolute NRBCs 0.0 10e3/uL     Procedures          Mazin Gann MD  07/16/24 [id number]

## 2024-07-16 NOTE — MEDICATION SCRIBE - ADMISSION MEDICATION HISTORY
Medication Scribe Admission Medication History    Admission medication history is complete. The information provided in this note is only as accurate as the sources available at the time of the update.    Information Source(s): Patient via in-person    Pertinent Information: patient reports self management of medications    Patient reports no longer taking: Ammonium Lactate, Lipitor, Tums, B12, Flexeril, Robitussin, Dilaudid, Flagyl, Miralax, Senna S, Aristocort, Kenalog,     Lasix: patient reports taking 20 mg BID     Gabapentin: patient reports taking 100 mg TID    Potassium: patient reports taking 20 meq BID    Ropinirole: patient reports taking 0.5 mg in AM and 1 mg in the evening.     Changes made to PTA medication list:  Added: Vashe  Deleted: Ammonium Lactate, Lipitor, Tums, B12, Flexeril, Robitussin, Dilaudid, Flagyl, Miralax, Senna S, Aristocort, Kenalog, Duplicate: Aspirin, B Complex  Changed: Iron to 3 times weekly (per patient), Tylenol to PRN (per patient), Vit C to 250 mg (per patient), Lasix to 20 BID, Gabapentin to TID, Pantoprazole to every day (per patient), Potassium to BID, Ropinirole to 0.5 AM/1 mg PM    Allergies reviewed with patient and updates made in EHR: yes    Medication History Completed By: Ramo Gallego 7/16/2024 3:05 PM    PTA Med List   Medication Sig Last Dose    acetaminophen (TYLENOL) 325 MG tablet Take 650 mg by mouth every 6 hours as needed 7/16/2024 at am    ascorbic acid (VITAMIN C) 250 MG CHEW chewable tablet Take 250 mg by mouth 2 times daily 7/16/2024 at am    aspirin (ASPIRIN 81) 81 MG chewable tablet Take 1 tablet by mouth every morning 7/15/2024 at am    Bioflavonoid Products (CINDY-C) 500-550 MG TABS Take 1 tablet by mouth 2 times daily 7/15/2024 at pm    buPROPion (WELLBUTRIN SR) 150 MG 12 hr tablet Take 150 mg by mouth every morning  7/15/2024 at am    Carboxymethylcellulose Sodium 0.25 % SOLN Apply 1 drop to eye daily as needed Past Month at prn    citalopram (CELEXA)  40 MG tablet Take 40 mg by mouth daily (with lunch) 7/15/2024 at 1200    ferrous sulfate (FEROSUL) 325 (65 Fe) MG tablet Take 325 mg by mouth three times a week 7/15/2024 at am    furosemide (LASIX) 20 MG tablet Take 20 mg by mouth 2 times daily 7/15/2024 at 1400    gabapentin (NEURONTIN) 100 MG capsule Take 100 mg by mouth 3 times daily 7/15/2024 at pm    magnesium oxide (MAG-OX) 400 MG tablet Take 1 tablet by mouth every morning 7/15/2024 at am    multivitamin w/minerals (CENTRUM ADULTS) tablet Take 1 tablet by mouth every morning 7/15/2024 at am    pantoprazole (PROTONIX) 40 MG EC tablet Take 40 mg by mouth every morning 7/16/2024 at am    potassium chloride ER (KLOR-CON M) 20 MEQ CR tablet Take 20 mEq by mouth 2 times daily 7/15/2024 at pm    rOPINIRole (REQUIP) 1 MG tablet Take 0.5 mg by mouth every morning In addition, patient takes one tablet (1 mg) in the evening 7/16/2024 at am    rOPINIRole (REQUIP) 1 MG tablet Take 1 mg by mouth every evening In addition, patient takes one half tablet (0.5 mg) in the morning 7/15/2024 at pm    simvastatin (ZOCOR) 40 MG tablet [SIMVASTATIN (ZOCOR) 40 MG TABLET] Take 40 mg by mouth bedtime. 7/15/2024 at pm    spironolactone (ALDACTONE) 25 MG tablet Take 25 mg by mouth every morning 7/15/2024 at am    sulfamethoxazole-trimethoprim (BACTRIM DS) 800-160 MG tablet Take 1 tablet by mouth 2 times daily for 14 days 7/16/2024 at am    vitamin B-Complex Take 1 tablet by mouth daily 7/15/2024 at am    vitamin D3 (CHOLECALCIFEROL) 250 mcg (87032 units) capsule Take 1 capsule (250 mcg) by mouth daily 7/15/2024 at am    Wound Cleansers (VASHE WOUND THERAPY EX) Externally apply topically daily as needed 7/15/2024 at prn    zinc 50 MG TABS Take 1 tablet by mouth every morning 7/15/2024 at am

## 2024-07-16 NOTE — ED NOTES
HAROLDO La attempted to place PIV with ultrasound guidance and was unsuccessful. Due to difficult stick, HAROLDO Graff requested to attempt. Dr. Zavala was notified regarding delay in collection of lab specimens and bolus of NS.

## 2024-07-16 NOTE — ED NOTES
"[facility] ED Handoff Report    ED Chief Complaint: Generalized body aches, wound check    ED Diagnosis:  (A41.9) Sepsis, due to unspecified organism, unspecified whether acute organ dysfunction present (H)    (L03.115) Cellulitis of right leg       PMH:    Past Medical History:   Diagnosis Date    Ankle contracture, left     Class 3 severe obesity due to excess calories without serious comorbidity with body mass index (BMI) of 45.0 to 49.9 in adult (H)     Combined gastric and duodenal ulcer     Controlled substance agreement broken     Depression     Dyslipidemia     Edema     Edema     Gait abnormality     GERD (gastroesophageal reflux disease)     Gout     Lymphedema of both lower extremities     Melanoma (H)     left upper arm    MS (multiple sclerosis) (H)     RLS (restless legs syndrome)     Skin ulcer of left lower leg (H)     Valgus deformity of both feet     Vitamin D deficiency         Code Status:  Prior     Falls Risk: No Band: Not applicable    Current Living Situation/Residence: lives with a significant other     Elimination Status: Continent: Yes, wears depend at baseline; external catheter placed in ED due to weakness and LE pain    Activity Level: SBA with cane    Patients Preferred Language:  English     Needed: No    Vital Signs:  /48   Pulse 91   Temp 100.4  F (38  C) (Oral)   Resp 14   Ht 1.549 m (5' 1\")   Wt 115.2 kg (254 lb)   SpO2 94%   BMI 47.99 kg/m       Cardiac Rhythm: SR    Pain Score: 3/10    Is the Patient Confused:  No    Last Food or Drink: 07/16/24 at 1435    Focused Assessment:  Patient comes in for generalized body aches, chills, diarrhea, and intermittent dizziness at home since last evening. She states she was seen by wound care yesterday for bilateral LE cellulitis. Wounds were dressed there and she was started on a new antibiotic, though she cannot recall what. She is febrile on arrival to ED with temp of 102.5 orally. Denies cough. " Cov/flu/RSV negative, UA negative. Elevated WBCs with normal lactic. Cultures were sent. IVF bolus and tylenol given, temp improved to 100.4. Vanco infusing. Needs stool sample.    Tests Performed: Done: Labs and Imaging    Treatments Provided:  see MAR    Family Dynamics/Concerns: No    Family Updated On Visitor Policy: Yes    Plan of Care Communicated to Family: Yes    Who Was Updated about Plan of Care: patient updated her spouse    Belongings Checklist Done and Signed by Patient: Yes    Belongings Sent with Patient: gold jason ariane ring, white gold ariane band, gold ring, silver and black hills gold ring, gold ariane ring, white gold ariane rings - saudered, watch, pandora bracelet, ariane necklace, 4 earrings (L), 3 earrings (R), glasses, purse, wallet, credit/debit cards, cell phone, 4 point cane, clothing, shoes    Medications sent with patient: n/a    Covid: symptomatic, negative    Additional Information:     RN: Rosa Maria WILLIAM 7/16/2024 5:20 PM

## 2024-07-16 NOTE — CONSULTS
Care Management Initial Consult    General Information  Assessment completed with: Patient,    Type of CM/SW Visit: Initial Assessment    Primary Care Provider verified and updated as needed: Yes   Readmission within the last 30 days: no previous admission in last 30 days      Reason for Consult: discharge planning  Advance Care Planning:            Communication Assessment  Patient's communication style: spoken language (English or Bilingual)             Cognitive  Cognitive/Neuro/Behavioral:                        Living Environment:   People in home: significant other     Current living Arrangements: house      Able to return to prior arrangements: yes       Family/Social Support:  Care provided by: self  Provides care for: no one  Marital Status: Lives with Significant Other  Significant Other, Children          Description of Support System: Supportive, Involved    Support Assessment: Adequate family and caregiver support    Current Resources:   Patient receiving home care services: No     Community Resources: None  Equipment currently used at home: cane, straight  Supplies currently used at home: None    Employment/Financial:  Employment Status: employed part-time        Financial Concerns: none           Does the patient's insurance plan have a 3 day qualifying hospital stay waiver?  No    Lifestyle & Psychosocial Needs:  Social Determinants of Health     Food Insecurity: Not on file   Depression: Not on file   Housing Stability: Not on file   Tobacco Use: Medium Risk (7/11/2024)    Patient History     Smoking Tobacco Use: Former     Smokeless Tobacco Use: Never     Passive Exposure: Not on file   Financial Resource Strain: Not on file   Alcohol Use: Not on file   Transportation Needs: Not on file   Physical Activity: Not on file   Interpersonal Safety: Not on file   Stress: Not on file   Social Connections: Not on file   Health Literacy: Not on file       Functional Status:  Prior to admission patient needed  assistance:   Dependent ADLs:: Independent, Ambulation-cane  Dependent IADLs:: Independent       Mental Health Status:  Mental Health Status: No Current Concerns       Chemical Dependency Status:  Chemical Dependency Status: No Current Concerns             Values/Beliefs:  Spiritual, Cultural Beliefs, Yazidism Practices, Values that affect care: no               Additional Information:  CM met with pt in room to discuss discharge planning and complete initial assessment.     Pt lives in a house with her S/O, Jaleel . Pt uses cane for ambulation. Pt is independent at baseline, works part time, and drives. Anticipate discharge home. Family to transport.    CM will continue to monitor progression of care, review team recommendations and provide discharge planning assist as needed.       ALBARO HughesW

## 2024-07-16 NOTE — H&P
[facility]    History and Physical - Hospitalist Service       Date of Admission:  7/16/2024    Assessment & Plan    Assessment:   Elizabeth Kelley is a 78 year old female admitted on 7/16/2024. She presented to the ER with complaint of fever, fatigue, body aches and diarrhea since yesterday and wound check, in the ER patient was again seen to be having a fever 102, labs significant for leukocytosis, blood cultures were drawn, patient received vancomycin and fluids in the ED and is going to be admitted for acute cellulitis of the right lower extremity and sepsis.    Problem list and plan:  Acute cellulitis of the right lower extremity  Sepsis(fever, mild KIKE, leukocytosis)  Diarrhea probably antibiotic associated  Patient presented to the ER with complaint of fever, fatigue, body aches and diarrhea since yesterday and wound check   Patient also experienced nausea associated with lightheadedness and few episodes of diarrhea and felt dehydrated  She was also endorsing open wounds to both legs with lymphedema which has been present for a few years and patient has been seen by vascular surgery and has had to deal with multiple infection of these wounds  Patient also was started on a new antibiotic possibly Bactrim yesterday for wound infection  Again was seen febrile with a fever of 102 in the ED  Labs significant for mildly elevated creatinine at 1.08 with a baseline of around below 1 along with leukocytosis  Monitor fever and WBC curve  Blood and urine cultures were drawn currently pending results  Urine analysis negative for acute process  Viral panel negative for COVID, flu, respiratory syncytial virus  Chest x-ray negative for acute process  Patient received vancomycin and fluids in the ED, will continue with same  Monitor fever and WBC curve  Continue Tylenol for pain and fever  Enteric bacterial viral panel and C. difficile of the stool ordered to rule out infectious etiology of  "diarrhea  Gentle IV hydration  Holding Lasix for now which patient takes for lymphedema, monitor volume status closely, on gentle IV hydration, restart Lasix tomorrow as appropriate  ID consulted, follow-up recommendations    Mild KIKE  Baseline creatinine below 1  Current creatinine 1.08  Gentle IV hydration  Monitor renal function closely    Normocytic anemia  Monitor CBC, follow-up iron panel    History of mood disorder  Continue with bupropion, citalopram    History of lymphedema of the bilateral lower extremities  Neuropathy  Restless leg syndrome  Holding Lasix for now, may restart tomorrow  Continue with spironolactone  Continue with gabapentin  Continue with ropinirole    History of hyperlipidemia  Continue with aspirin and atorvastatin    Physical deconditioning/weakness  Fall precautions  PT and OT evaluation    DVT PPx  Intermittent pneumatic compression    CODE STATUS  Full code as per discussion with the patient          Diet: Combination Diet Regular Diet Adult    DVT Prophylaxis: Pneumatic Compression Devices  Amato Catheter: Not present  Lines: None     Cardiac Monitoring: None  Code Status: Full Code    Mental status: Patient was alert and awake and oriented x 3, patient was okay historian, most of the history was obtained from chart review and discussion with the ER physician with some history being obtained from the patient.  Clinically Significant Risk Factors Present on Admission                # Drug Induced Platelet Defect: home medication list includes an antiplatelet medication   # Hypertension: Noted on problem list             # Severe Obesity: Estimated body mass index is 47.99 kg/m  as calculated from the following:    Height as of this encounter: 1.549 m (5' 1\").    Weight as of this encounter: 115.2 kg (254 lb).       # Financial/Environmental Concerns: none         Disposition Plan     Medically Ready for Discharge: Anticipated in 2-4 Days     Saad J. Gondal, MD  Hospitalist Service  M " [facility]  Securely message with [employer] (more info)  Text page via [employer] Paging/Directory     ______________________________________________________________________    Chief Complaint   Fever, fatigue, body aches, diarrhea    History is obtained from the patient    History of Present Illness   Elizabeth Kelley is a 78 year old female with a past medical history documented below presented to the ER with complaint of fever, fatigue, body aches and diarrhea since yesterday and wound check, patient woke up feeling normal, she took her temperature and was afebrile, later on at work she measured her temperature again at which point she was febrile and had a fever of 102, also experienced nausea associated with lightheadedness and few episodes of diarrhea and felt dehydrated, she was also endorsing open wounds to both legs with lymphedema which has been present for a few years and patient has been seen by vascular surgery and has had to deal with multiple infection of these wounds, for above symptoms she presented to the ER, in the ER patient was again seen to be having a fever 102 along with having mildly elevated blood pressure which has since improved, labs significant for mildly elevated creatinine at 1.08 with a baseline of around below 1 along with leukocytosis and normocytic anemia, blood and urine cultures were drawn, urine analysis negative for acute process, viral panel negative for COVID, flu, respiratory syncytial virus, chest x-ray negative for acute process, patient received vancomycin and fluids in the ED and is going to be admitted for acute cellulitis of the right lower extremity and sepsis.      Past Medical History    Past Medical History:   Diagnosis Date    Ankle contracture, left     Class 3 severe obesity due to excess calories without serious comorbidity with body mass index (BMI) of 45.0 to 49.9 in adult (H)     Combined gastric and duodenal ulcer     Controlled substance  agreement broken     Depression     Dyslipidemia     Edema     Edema     Gait abnormality     GERD (gastroesophageal reflux disease)     Gout     Lymphedema of both lower extremities     Melanoma (H)     left upper arm    MS (multiple sclerosis) (H)     RLS (restless legs syndrome)     Skin ulcer of left lower leg (H)     Valgus deformity of both feet     Vitamin D deficiency        Past Surgical History   Past Surgical History:   Procedure Laterality Date    ABLATION SAPHENOUS VEIN W/ RFA Right 04/12/2021    Saphenous vein, anterior accessory saphenous vein, sclerotherapy of multiple veins.    COSMETIC SURGERY  1988    reduction of excess skin from weight loss    ESOPHAGOSCOPY, GASTROSCOPY, DUODENOSCOPY (EGD), COMBINED N/A 03/30/2015    Procedure: UPPER ENDOSCOPY with gastric biopsy;  Surgeon: Checo Fletcher MD;  Location: [facility];  Service:     IRRIGATION AND DEBRIDEMENT LOWER EXTREMITY, COMBINED Right 3/21/2022    Procedure: RIGHT LEG WOUND DEBRIDEMENT, PAULIE DERMATONE, MISONIX, VAC VERA FLOW WITH VASHE;  Surgeon: Gabriele Bullock MD;  Location:  OR    IRRIGATION AND DEBRIDEMENT LOWER EXTREMITY, COMBINED Right 3/28/2022    Procedure: DEBRIDEMENT AND WOUND DRESSING CHANGE AND MYRIAD APPLICATION OF RIGHT LOWER LEG WOUND;  Surgeon: Gabriele Bullock MD;  Location: [facility] OR    MAMMOPLASTY REDUCTION Bilateral     MOHS MICROGRAPHIC PROCEDURE      left upper arm, melanoma    PICC SINGLE LUMEN PLACEMENT  8/13/2021         PICC SINGLE LUMEN PLACEMENT  9/2/2021         SPINE SURGERY      L5 area surgery, decompression       Prior to Admission Medications   Prior to Admission Medications   Prescriptions Last Dose Informant Patient Reported? Taking?   Bioflavonoid Products (CINDY-C) 500-550 MG TABS 7/15/2024 at pm Self No Yes   Sig: Take 1 tablet by mouth 2 times daily   Carboxymethylcellulose Sodium 0.25 % SOLN Past Month at prn  Yes Yes   Sig: Apply 1 drop to eye daily as needed   Wound Cleansers (VASHE WOUND  THERAPY EX) 7/15/2024 at prn  Yes Yes   Sig: Externally apply topically daily as needed   acetaminophen (TYLENOL) 325 MG tablet 7/16/2024 at am  Yes Yes   Sig: Take 650 mg by mouth every 6 hours as needed   acetic acid 0.25 % irrigation was not at available  No No   Sig: Irrigate with as directed daily for 30 days   ascorbic acid (VITAMIN C) 250 MG CHEW chewable tablet 7/16/2024 at am  Yes Yes   Sig: Take 250 mg by mouth 2 times daily   aspirin (ASPIRIN 81) 81 MG chewable tablet 7/15/2024 at am  Yes Yes   Sig: Take 1 tablet by mouth every morning   benzonatate (TESSALON) 100 MG capsule was not at available  No No   Sig: Take 1 capsule (100 mg) by mouth 3 times daily as needed for cough   buPROPion (WELLBUTRIN SR) 150 MG 12 hr tablet 7/15/2024 at am Self Yes Yes   Sig: Take 150 mg by mouth every morning    citalopram (CELEXA) 40 MG tablet 7/15/2024 at 1200 Self Yes Yes   Sig: Take 40 mg by mouth daily (with lunch)   ferrous sulfate (FEROSUL) 325 (65 Fe) MG tablet 7/15/2024 at am  Yes Yes   Sig: Take 325 mg by mouth three times a week   furosemide (LASIX) 20 MG tablet 7/15/2024 at 1400  Yes Yes   Sig: Take 20 mg by mouth 2 times daily   gabapentin (NEURONTIN) 100 MG capsule 7/15/2024 at pm  Yes Yes   Sig: Take 100 mg by mouth 3 times daily   magnesium oxide (MAG-OX) 400 MG tablet 7/15/2024 at am  Yes Yes   Sig: Take 1 tablet by mouth every morning   multivitamin w/minerals (CENTRUM ADULTS) tablet 7/15/2024 at am Self Yes Yes   Sig: Take 1 tablet by mouth every morning   nitroGLYcerin (NITROSTAT) 0.4 MG sublingual tablet n/a at n/a  Yes No   Sig: PLACE 1 TABLET UNDER TONGUE EVERY 5 MINTUES AS NEEDED FOR CHEST PAIN FOR UP TO 30 DAYS   pantoprazole (PROTONIX) 40 MG EC tablet 7/16/2024 at am  Yes Yes   Sig: Take 40 mg by mouth every morning   potassium chloride ER (KLOR-CON M) 20 MEQ CR tablet 7/15/2024 at pm Self Yes Yes   Sig: Take 20 mEq by mouth 2 times daily   rOPINIRole (REQUIP) 1 MG tablet 7/15/2024 at pm Self Yes  Yes   Sig: Take 1 mg by mouth every evening In addition, patient takes one half tablet (0.5 mg) in the morning   rOPINIRole (REQUIP) 1 MG tablet 7/16/2024 at am  Yes Yes   Sig: Take 0.5 mg by mouth every morning In addition, patient takes one tablet (1 mg) in the evening   simvastatin (ZOCOR) 40 MG tablet 7/15/2024 at pm Self Yes Yes   Sig: [SIMVASTATIN (ZOCOR) 40 MG TABLET] Take 40 mg by mouth bedtime.   spironolactone (ALDACTONE) 25 MG tablet 7/15/2024 at am Self Yes Yes   Sig: Take 25 mg by mouth every morning   sulfamethoxazole-trimethoprim (BACTRIM DS) 800-160 MG tablet 7/16/2024 at am  No Yes   Sig: Take 1 tablet by mouth 2 times daily for 14 days   vitamin B-Complex 7/15/2024 at am Self No Yes   Sig: Take 1 tablet by mouth daily   vitamin D3 (CHOLECALCIFEROL) 250 mcg (02371 units) capsule 7/15/2024 at am Self No Yes   Sig: Take 1 capsule (250 mcg) by mouth daily   zinc 50 MG TABS 7/15/2024 at am  Yes Yes   Sig: Take 1 tablet by mouth every morning      Facility-Administered Medications: None        Review of Systems    The 10 point Review of Systems is negative other than noted in the HPI or here. Fever, fatigue, body aches, diarrhea    Physical Exam   Vital Signs: Temp: 100.4  F (38  C) Temp src: Oral BP: 115/48 Pulse: 91   Resp: 14 SpO2: 94 % O2 Device: Nasal cannula Oxygen Delivery: 1.5 LPM  Weight: 254 lbs 0 oz    GENERAL: Patient was seen and examined at bedside with no acute distress, morbidly obese  HENT:  Head is normocephalic, atraumatic.   EYES:  Eyes have anicteric sclerae without conjunctival injection   LUNGS: Bilateral air entry, clear to auscultation bilaterally  CARDIOVASCULAR:  Regular rate and rhythm with no murmurs, gallops or rubs.  ABDOMEN:  Normal bowel sounds. Soft; nontender   EXT: Bilateral lower extremity lymphedema, right more than the left  SKIN:  No acute rashes.  Right lower extremity red, swollen, tender  NEUROLOGIC:  Grossly nonfocal.      Medical Decision Making       80  MINUTES SPENT BY ME on the date of service doing chart review, history, exam, documentation & further activities per the note.      Data     I have personally reviewed the following data over the past 24 hrs:    15.6 (H)  \   11.1 (L)   / 242     140 101 17.6 /  86   4.1 27 1.08 (H) \     ALT: 21 AST: 20 AP: 127 TBILI: 0.3   ALB: 3.9 TOT PROTEIN: 7.2 LIPASE: N/A     Procal: 0.41 CRP: N/A Lactic Acid: 1.2         Imaging results reviewed over the past 24 hrs:   Recent Results (from the past 24 hour(s))   XR Chest Port 1 View    Narrative    EXAM: XR CHEST PORT 1 VIEW  LOCATION: [facility]  DATE: 7/16/2024    INDICATION: Fever.  COMPARISON: 4/7/2022.      Impression    IMPRESSION: Negative chest. Lungs are grossly clear.

## 2024-07-16 NOTE — ED NOTES
Spoke with Dr. Zavala regarding 2nd set of cultures and previously difficult sticks. Per provider, have phlebotomy attempt collection within 30 minutes. If unsuccessful, begin antibiotics.  Writer contacted phlebotomy and requested blood collection at this time.

## 2024-07-16 NOTE — ED TRIAGE NOTES
Pt presents to ED from home via [facility] medics. Per report, patient has a cellulitis of the RLE and sees wound care. She was seen there yesterday and started on a new antibiotic. Last night she developed a fever, chills, dizziness, and some diarrhea. She also endorses generalized body aches and RLE pain. Febrile 102.5 on arrival.

## 2024-07-17 ENCOUNTER — APPOINTMENT (OUTPATIENT)
Dept: OCCUPATIONAL THERAPY | Facility: HOSPITAL | Age: 79
DRG: 872 | End: 2024-07-17
Attending: INTERNAL MEDICINE
Payer: COMMERCIAL

## 2024-07-17 ENCOUNTER — APPOINTMENT (OUTPATIENT)
Dept: PHYSICAL THERAPY | Facility: HOSPITAL | Age: 79
DRG: 872 | End: 2024-07-17
Attending: INTERNAL MEDICINE
Payer: COMMERCIAL

## 2024-07-17 LAB
ALBUMIN SERPL BCG-MCNC: 3.1 G/DL (ref 3.5–5.2)
ALP SERPL-CCNC: 135 U/L (ref 40–150)
ALT SERPL W P-5'-P-CCNC: 34 U/L (ref 0–50)
ANION GAP SERPL CALCULATED.3IONS-SCNC: 13 MMOL/L (ref 7–15)
AST SERPL W P-5'-P-CCNC: 44 U/L (ref 0–45)
BILIRUB SERPL-MCNC: 0.2 MG/DL
BUN SERPL-MCNC: 16.8 MG/DL (ref 8–23)
CALCIUM SERPL-MCNC: 8 MG/DL (ref 8.8–10.4)
CHLORIDE SERPL-SCNC: 106 MMOL/L (ref 98–107)
CREAT SERPL-MCNC: 0.97 MG/DL (ref 0.51–0.95)
EGFRCR SERPLBLD CKD-EPI 2021: 60 ML/MIN/1.73M2
ERYTHROCYTE [DISTWIDTH] IN BLOOD BY AUTOMATED COUNT: 15.6 % (ref 10–15)
FERRITIN SERPL-MCNC: 102 NG/ML (ref 11–328)
GLUCOSE BLDC GLUCOMTR-MCNC: 107 MG/DL (ref 70–99)
GLUCOSE BLDC GLUCOMTR-MCNC: 137 MG/DL (ref 70–99)
GLUCOSE BLDC GLUCOMTR-MCNC: 97 MG/DL (ref 70–99)
GLUCOSE SERPL-MCNC: 99 MG/DL (ref 70–99)
HCO3 SERPL-SCNC: 23 MMOL/L (ref 22–29)
HCT VFR BLD AUTO: 31 % (ref 35–47)
HGB BLD-MCNC: 9.7 G/DL (ref 11.7–15.7)
MAGNESIUM SERPL-MCNC: 2.4 MG/DL (ref 1.7–2.3)
MCH RBC QN AUTO: 27.6 PG (ref 26.5–33)
MCHC RBC AUTO-ENTMCNC: 31.3 G/DL (ref 31.5–36.5)
MCV RBC AUTO: 88 FL (ref 78–100)
PHOSPHATE SERPL-MCNC: 3.1 MG/DL (ref 2.5–4.5)
PLATELET # BLD AUTO: 194 10E3/UL (ref 150–450)
POTASSIUM SERPL-SCNC: 4.5 MMOL/L (ref 3.4–5.3)
PROT SERPL-MCNC: 5.5 G/DL (ref 6.4–8.3)
RBC # BLD AUTO: 3.51 10E6/UL (ref 3.8–5.2)
SODIUM SERPL-SCNC: 142 MMOL/L (ref 135–145)
VIT B12 SERPL-MCNC: 1145 PG/ML (ref 232–1245)
WBC # BLD AUTO: 13.5 10E3/UL (ref 4–11)

## 2024-07-17 PROCEDURE — 84100 ASSAY OF PHOSPHORUS: CPT | Performed by: STUDENT IN AN ORGANIZED HEALTH CARE EDUCATION/TRAINING PROGRAM

## 2024-07-17 PROCEDURE — 36415 COLL VENOUS BLD VENIPUNCTURE: CPT | Performed by: STUDENT IN AN ORGANIZED HEALTH CARE EDUCATION/TRAINING PROGRAM

## 2024-07-17 PROCEDURE — 97110 THERAPEUTIC EXERCISES: CPT | Mod: GO

## 2024-07-17 PROCEDURE — 83735 ASSAY OF MAGNESIUM: CPT | Performed by: STUDENT IN AN ORGANIZED HEALTH CARE EDUCATION/TRAINING PROGRAM

## 2024-07-17 PROCEDURE — 120N000001 HC R&B MED SURG/OB

## 2024-07-17 PROCEDURE — 99233 SBSQ HOSP IP/OBS HIGH 50: CPT | Performed by: INTERNAL MEDICINE

## 2024-07-17 PROCEDURE — 99222 1ST HOSP IP/OBS MODERATE 55: CPT | Performed by: INTERNAL MEDICINE

## 2024-07-17 PROCEDURE — 97165 OT EVAL LOW COMPLEX 30 MIN: CPT | Mod: GO

## 2024-07-17 PROCEDURE — 250N000013 HC RX MED GY IP 250 OP 250 PS 637: Performed by: STUDENT IN AN ORGANIZED HEALTH CARE EDUCATION/TRAINING PROGRAM

## 2024-07-17 PROCEDURE — 82040 ASSAY OF SERUM ALBUMIN: CPT | Performed by: STUDENT IN AN ORGANIZED HEALTH CARE EDUCATION/TRAINING PROGRAM

## 2024-07-17 PROCEDURE — 97161 PT EVAL LOW COMPLEX 20 MIN: CPT | Mod: GP

## 2024-07-17 PROCEDURE — 250N000011 HC RX IP 250 OP 636: Performed by: INTERNAL MEDICINE

## 2024-07-17 PROCEDURE — 85027 COMPLETE CBC AUTOMATED: CPT | Performed by: STUDENT IN AN ORGANIZED HEALTH CARE EDUCATION/TRAINING PROGRAM

## 2024-07-17 PROCEDURE — 97116 GAIT TRAINING THERAPY: CPT | Mod: GP

## 2024-07-17 PROCEDURE — 250N000011 HC RX IP 250 OP 636: Performed by: STUDENT IN AN ORGANIZED HEALTH CARE EDUCATION/TRAINING PROGRAM

## 2024-07-17 RX ORDER — NALOXONE HYDROCHLORIDE 0.4 MG/ML
0.4 INJECTION, SOLUTION INTRAMUSCULAR; INTRAVENOUS; SUBCUTANEOUS
Status: DISCONTINUED | OUTPATIENT
Start: 2024-07-17 | End: 2024-07-23 | Stop reason: HOSPADM

## 2024-07-17 RX ORDER — NALOXONE HYDROCHLORIDE 0.4 MG/ML
0.2 INJECTION, SOLUTION INTRAMUSCULAR; INTRAVENOUS; SUBCUTANEOUS
Status: DISCONTINUED | OUTPATIENT
Start: 2024-07-17 | End: 2024-07-23 | Stop reason: HOSPADM

## 2024-07-17 RX ORDER — ENOXAPARIN SODIUM 100 MG/ML
40 INJECTION SUBCUTANEOUS EVERY 12 HOURS
Status: DISCONTINUED | OUTPATIENT
Start: 2024-07-17 | End: 2024-07-23 | Stop reason: HOSPADM

## 2024-07-17 RX ORDER — ENOXAPARIN SODIUM 100 MG/ML
40 INJECTION SUBCUTANEOUS EVERY 24 HOURS
Status: DISCONTINUED | OUTPATIENT
Start: 2024-07-17 | End: 2024-07-17

## 2024-07-17 RX ORDER — ZOLPIDEM TARTRATE 5 MG/1
5 TABLET ORAL
Status: DISCONTINUED | OUTPATIENT
Start: 2024-07-17 | End: 2024-07-23 | Stop reason: HOSPADM

## 2024-07-17 RX ORDER — HYDROMORPHONE HCL IN WATER/PF 6 MG/30 ML
0.2 PATIENT CONTROLLED ANALGESIA SYRINGE INTRAVENOUS 2 TIMES DAILY PRN
Status: DISCONTINUED | OUTPATIENT
Start: 2024-07-17 | End: 2024-07-23 | Stop reason: HOSPADM

## 2024-07-17 RX ADMIN — CITALOPRAM 40 MG: 40 TABLET ORAL at 12:19

## 2024-07-17 RX ADMIN — ACETAMINOPHEN 975 MG: 325 TABLET ORAL at 12:20

## 2024-07-17 RX ADMIN — ENOXAPARIN SODIUM 40 MG: 40 INJECTION SUBCUTANEOUS at 23:39

## 2024-07-17 RX ADMIN — Medication 250 MG: at 08:08

## 2024-07-17 RX ADMIN — ACETAMINOPHEN 975 MG: 325 TABLET ORAL at 18:26

## 2024-07-17 RX ADMIN — SPIRONOLACTONE 25 MG: 25 TABLET, FILM COATED ORAL at 08:08

## 2024-07-17 RX ADMIN — BUPROPION HYDROCHLORIDE 150 MG: 150 TABLET, FILM COATED, EXTENDED RELEASE ORAL at 08:08

## 2024-07-17 RX ADMIN — ASPIRIN 81 MG CHEWABLE TABLET 81 MG: 81 TABLET CHEWABLE at 08:08

## 2024-07-17 RX ADMIN — HYDROMORPHONE HYDROCHLORIDE 0.2 MG: 0.2 INJECTION, SOLUTION INTRAMUSCULAR; INTRAVENOUS; SUBCUTANEOUS at 19:45

## 2024-07-17 RX ADMIN — SIMVASTATIN 40 MG: 40 TABLET, FILM COATED ORAL at 19:45

## 2024-07-17 RX ADMIN — Medication 250 MG: at 19:44

## 2024-07-17 RX ADMIN — GABAPENTIN 100 MG: 100 CAPSULE ORAL at 08:08

## 2024-07-17 RX ADMIN — PANTOPRAZOLE SODIUM 40 MG: 40 TABLET, DELAYED RELEASE ORAL at 08:09

## 2024-07-17 RX ADMIN — VANCOMYCIN HYDROCHLORIDE 1000 MG: 1 INJECTION, SOLUTION INTRAVENOUS at 17:11

## 2024-07-17 RX ADMIN — ROPINIROLE HYDROCHLORIDE 0.5 MG: 0.25 TABLET, FILM COATED ORAL at 08:08

## 2024-07-17 RX ADMIN — ROPINIROLE HYDROCHLORIDE 1 MG: 1 TABLET, FILM COATED ORAL at 19:44

## 2024-07-17 RX ADMIN — GABAPENTIN 100 MG: 100 CAPSULE ORAL at 14:24

## 2024-07-17 RX ADMIN — FERROUS SULFATE TAB 325 MG (65 MG ELEMENTAL FE) 325 MG: 325 (65 FE) TAB at 08:08

## 2024-07-17 RX ADMIN — ACETAMINOPHEN 975 MG: 325 TABLET ORAL at 06:30

## 2024-07-17 RX ADMIN — ACETAMINOPHEN 975 MG: 325 TABLET ORAL at 23:38

## 2024-07-17 RX ADMIN — GABAPENTIN 100 MG: 100 CAPSULE ORAL at 19:44

## 2024-07-17 ASSESSMENT — ACTIVITIES OF DAILY LIVING (ADL)
ADLS_ACUITY_SCORE: 27
ADLS_ACUITY_SCORE: 31
ADLS_ACUITY_SCORE: 31
ADLS_ACUITY_SCORE: 27
ADLS_ACUITY_SCORE: 31
ADLS_ACUITY_SCORE: 27
ADLS_ACUITY_SCORE: 31
ADLS_ACUITY_SCORE: 27
ADLS_ACUITY_SCORE: 31
ADLS_ACUITY_SCORE: 36
ADLS_ACUITY_SCORE: 27
ADLS_ACUITY_SCORE: 27
ADLS_ACUITY_SCORE: 31
ADLS_ACUITY_SCORE: 31
ADLS_ACUITY_SCORE: 27
ADLS_ACUITY_SCORE: 31
ADLS_ACUITY_SCORE: 27

## 2024-07-17 NOTE — PROGRESS NOTES
Occupational Therapy      07/17/24 1330   Appointment Info   Signing Clinician's Name / Credentials (OT) Jamilah Brower OTR/L   Rehab Comments (OT) Daily-patient likely able to discharge home   Living Environment   People in Home significant other   Current Living Arrangements house   Home Accessibility stairs to enter home   Number of Stairs, Main Entrance 4   Stair Railings, Main Entrance railings safe and in good condition   Living Environment Comments Patient independent at baseline   Self-Care   Fall history within last six months no   General Information   Onset of Illness/Injury or Date of Surgery 07/16/24   Referring Physician Bryon Reza MD   Patient/Family Therapy Goal Statement (OT) none stated   Additional Occupational Profile Info/Pertinent History of Current Problem Elizabeth Kelley is a 78 year old female admitted on 7/16/2024. She presented to the ER with complaint of fever, fatigue, body aches and diarrhea since yesterday and wound check, in the ER patient was again seen to be having a fever 102, labs significant for leukocytosis, blood cultures were drawn, patient received vancomycin and fluids in the ED and is going to be admitted for acute cellulitis of the right lower extremity and sepsis.   Existing Precautions/Restrictions no known precautions/restrictions   Cognitive Status Examination   Orientation Status orientation to person, place and time   Range of Motion Comprehensive   General Range of Motion no range of motion deficits identified   Strength Comprehensive (MMT)   General Manual Muscle Testing (MMT) Assessment no strength deficits identified   Bed Mobility   Bed Mobility supine-sit;sit-supine   Supine-Sit Bonner (Bed Mobility) contact guard   Sit-Supine Bonner (Bed Mobility) contact guard   Comment (Bed Mobility) Increased time and use of bedrail   Transfers   Transfers sit-stand transfer   Sit-Stand Transfer   Sit-Stand Bonner (Transfers) contact guard    Sit/Stand Transfer Comments CGA from EOB, static sitting independently   Activities of Daily Living   BADL Assessment/Intervention lower body dressing   Lower Body Dressing Assessment/Training   Comment, (Lower Body Dressing) Patient ambulate from bed to BR-ambulates with SEC, SOB noted with limited activity   L[place]comb Level (Lower Body Dressing) maximum assist (25% patient effort)   Clinical Impression   Criteria for Skilled Therapeutic Interventions Met (OT) Yes, treatment indicated   OT Diagnosis decreased ADL independence   OT Problem List-Impairments impacting ADL problems related to;activity tolerance impaired   Assessment of Occupational Performance 1-3 Performance Deficits   Identified Performance Deficits endurance for ADLs   Planned Therapy Interventions (OT) ADL retraining;home program guidelines;progressive activity/exercise   Clinical Decision Making Complexity (OT) problem focused assessment/low complexity   Risk & Benefits of therapy have been explained evaluation/treatment results reviewed;care plan/treatment goals reviewed   OT Total Evaluation Time   OT Eval, Low Complexity Minutes (61944) 15   OT Goals   Therapy Frequency (OT) Daily   OT Predicted Duration/Target Date for Goal Attainment 07/24/24   OT Goals Hygiene/Grooming;Toilet Transfer/Toileting;Lower Body Dressing   OT: Hygiene/Grooming supervision/stand-by assist;while standing   OT: Lower Body Dressing Modified independent;using adaptive equipment   OT: Toilet Transfer/Toileting Independent;toilet transfer;cleaning and garment management   Interventions   Interventions Quick Adds Therapeutic Procedures/Exercise   Therapeutic Procedures/Exercise   Therapeutic Procedure: strength, endurance, ROM, flexibillity minutes (28403) 8   Symptoms Noted During/After Treatment fatigue   Treatment Detail/Skilled Intervention Patient ambulated from room to desk (~75 feet wtih CGA and quad cane, no LOB, multiple standing rest breaks. SOB but oxgyen  above 90% on RA.   OT Discharge Planning   OT Plan Standing @ sink, LE dressing, toileting   OT Discharge Recommendation (DC Rec) home with home care occupational therapy   OT Rationale for DC Rec While patient demonstrating decreased endurance, patient able to perform ADLs with supervision. Patient has spouse to assist if needed.   OT Brief overview of current status CGA for ADLs, decreased endurance

## 2024-07-17 NOTE — PHARMACY-VANCOMYCIN DOSING SERVICE
Pharmacy Vancomycin Initial Note  Date of Service 2024  Patient's  1945  78 year old, female    Indication: Skin and Soft Tissue Infection    Current estimated CrCl = Estimated Creatinine Clearance: 51.9 mL/min (A) (based on SCr of 1.08 mg/dL (H)).    Creatinine for last 3 days  2024:  1:58 PM Creatinine 1.08 mg/dL    Recent Vancomycin Level(s) for last 3 days  No results found for requested labs within last 3 days.      Vancomycin IV Administrations (past 72 hours)                     vancomycin (VANCOCIN) 2,500 mg in sodium chloride 0.9 % 500 mL intermittent infusion (mg) 2,500 mg New Bag 24 1617                    Nephrotoxins and other renal medications (From now, onward)      Start     Dose/Rate Route Frequency Ordered Stop    24 1700  vancomycin (VANCOCIN) 1,000 mg in 200 mL dextrose intermittent infusion         1,000 mg  200 mL/hr over 1 Hours Intravenous EVERY 24 HOURS 24 19424  [Held by provider]  furosemide (LASIX) tablet 20 mg        (On hold since today at 1729 until manually unheld; held by Gondal, Saad J, MDHold Reason: OtherHold Comments: dehydration)   Note to Pharmacy: PTA Sig:Take 20 mg by mouth 2 times daily      20 mg Oral 2 TIMES DAILY 24 1729              Contrast Orders - past 72 hours (72h ago, onward)      None            [employer] Prediction of Planned Initial Vancomycin Regimen  Regimen: 1000 mg IV every 24 hours.  Start time: 17:00 on 2024  Exposure target: AUC24 (range)400-600 mg/L.hr   AUC24,ss: 519 mg/L.hr  Probability of AUC24 > 400: 70 %  Ctrough,ss: 14.8 mg/L  Probability of Ctrough,ss > 20: 35 %  Probability of nephrotoxicity (Lodise LOUIS ): 10 %          Plan:  Start vancomycin  1000 mg IV q24h.   Vancomycin monitoring method: AUC  Vancomycin therapeutic monitoring goal: 400-600 mg*h/L  Pharmacy will check vancomycin levels as appropriate in 1-3 Days.    Serum creatinine levels will be ordered daily for the  first week of therapy and at least twice weekly for subsequent weeks.      Britney Durbin, [facility]

## 2024-07-17 NOTE — PLAN OF CARE
Problem: Infection  Goal: Absence of Infection Signs and Symptoms  Intervention: Prevent or Manage Infection  Recent Flowsheet Documentation  Taken 7/16/2024 1837 by Angelica Perez RN  Isolation Precautions:   enteric precautions maintained   contact precautions maintained     Problem: Comorbidity Management  Goal: Maintenance of Behavioral Health Symptom Control  Outcome: Progressing  Goal: Blood Pressure in Desired Range  Outcome: Progressing   Goal Outcome Evaluation:                  A&Ox4. Assist of 1 with a cane. On tele, NSR. Bedtime blood sugar was 117. Pt was complaining of 9/10 right foot pain, MD notified. Pain controlled with PRN Tylenol and scheduled meds. Tolerating regular diet. Afebrile. No BM this shift.

## 2024-07-17 NOTE — CONSULTS
[facility] Nurse Inpatient Assessment     Consulted for: wound on both legs    Summary:     Patient History (according to provider note(s):      78 year old female presents for evaluation of bodyaches, fever, nausea, lightheadedness and concerns for possible wound infection on her right lower extremity.  Patient is chronic lymphedema of bilateral lower extremities, that appears to be worse on the right, has a large ulcer on the right lower extremity that is followed closely by the wound clinic, had good photodocumentation and examination yesterday in the wound clinic, those notes have been reviewed.  They were concerned for worsening of her chronic ulcer on the right lower extremity thus was started on Bactrim.  Today patient spontaneously developed a fever while driving and began feeling very unwell.  Patient febrile to 102.5 upon arrival, though otherwise hemodynamically stable.  Fever treated with Tylenol.  White count mildly elevated at 15.6, though lactic acid normal at 1.2.  Urinalysis without signs of infection, COVID/flu/RSV negative.  No significant respiratory symptoms, thus suspect pneumonia much less likely, though patient has had bad pneumonia in the past during a prolonged hospitalization thus will obtain a chest x-ray.  Would favor possible sepsis secondary to this worsening leg ulceration/cellulitis.  Patient also reporting some diarrhea, thus will obtain stool studies as well given that she recently started antibiotics and is now having new diarrhea.  Started on vancomycin for presumed soft tissue infection of the right lower extremity with signs of sepsis.  Will admit to hospitalist service.     Prior to making a final disposition on this patient the results of patient's tests and other diagnostic studies were discussed with the patient. All questions were answered. Patient expressed understanding of the plan and was amenable to it.    Assessment:      Wound location:  "Legs              7/17  RLE                                                                                                                                                                                                          BLE                                                          LLE  Last photo: 7/17  Wound due to: Venous Ulcer and lymphedema  Wound history/plan of care: is seen at a wound clinic for leg wraps and care  Wound base: 90 % non-granular tissue, 10 % fibrin     Palpation of the wound bed: normal      Drainage: moderate     Description of drainage: serous and serosanguinous - mostly serous     Measurements (length x width x depth, in cm): R lateral shin measures 14 x 11 x 0.1cm ; R medial knee 3.2 x 2 x 0.3cm; L shin measures 1.5 x 1 x 0.1cm, posterior measures 1.5 x 3.4 x 0.1cm     Tunneling: N/A     Undermining: N/A  Periwound skin: Denuded, Dry/scaly, Erythema- blanchable, and Superficial erosion      Color: pink and red      Temperature: warm  Odor: mild  Pain: severe, Irritable or crying out at intervals, tension to hands, feet and body, facial expression of distress, and during dressing change, throbbing, shooting, radiating, and tender  Pain interventions prior to dressing change: oral tylenol and slow and gentle cares   Treatment goal: Drainage control, Infection control/prevention, Protection, and Promote epidermal migration  STATUS: initial assessment  Supplies ordered: gathered         Treatment Plan:     BLE:  WOC nurses will do wound care T/F  If Edema wear \"netting\" is too uncomfortable, remove gently. Place hydrofera blue pieces back over the open wounds then cover with abd pads and wrap with kerlix. Alert wound care if dressing is removed.       EdemaWear stockings: Use and Care    Rationale for use:   Decreases edema by improving lymphatic and venous function    Safe and gentle compression  Enhances wound healing  Protects skin    General:   EdemaWear can be worn 24/7, but " "should be removed at least daily for skin inspection and cares    In order to be effective, EdemaWear should DIRECTLY contact the skin as much as possible -- the mesh weave promotes lymphatic drainage when it is pressed into the skin  Choose appropriate size EdemaWear based on leg (or arm) circumference - see table for guidelines  EdemaWear LITE is only for especially fragile or painful skin  Cleanse and moisturize any intact or scaly skin before applying EdemaWear  Ok to apply additional compression over the EdemaWear (ie Lymph wraps)    Application:   Apply the EdemaWear from base of toes to knee, or above knee if tolerated and it is a long stocking  Create a wide 3\" cuff at the top if needed to prevent rolling down  Only trim the stocking if it is excessively long; do NOT cut in half; can often fold over excess length onto foot    When wounds are present:  Wound dressings that directly treat the wound bed can be applied under the EdemaWear  Any additional cover dressings (dry gauze, ABD pads, Kerlix, etc) should be applied ON TOP of the stockings whenever feasible   Stockings may get soiled with drainage and will need to be washed; ensure an extra clean, dry pair always available  May need two people to apply the stocking - bunch up stocking and pull against each other, lifting over wounds    Care:  When stockings are soiled, DO NOT THROW AWAY, hand wash with mild soap, rinse, hang dry  Replace approximately every 4 to 6 months        EdemaWear size Max circumference Stripe color PS # # stockings per pack   Small 18\"  (45cm) navy [id number] 2   Medium 30\"  (75cm) yellow 605990 1   Large 46\"  (115cm) red 532259 1   X Large 60\"  (150cm) aqua 396107 1   Small LITE 24\"  (60cm) purple 166878 2   Medium LITE 36\"  (90cm) orange 018984 1     RLE  - Yellow from toes to knee, red from knee to upper thigh  LLE  - Blue from toes to knee, red from knee to upper thigh      Orders: Written    RECOMMEND PRIMARY TEAM ORDER:  return " to outpatient wound care team  Education provided: plan of care  Discussed plan of care with: Patient, Family, and Nurse  [facility] nurse follow-up plan: Tuesday/Friday  Notify [facility] if wound(s) deteriorate.  Nursing to notify the Provider(s) and re-consult the [facility] Nurse if new skin concern.    DATA:     Current support surface: Standard  Standard Isoflex gel  Containment of urine/stool: Incontinent pad in bed and Suction based external urinary catheter   BMI: Body mass index is 50.5 kg/m .   Active diet order: Orders Placed This Encounter      Combination Diet Regular Diet Adult     Output: I/O last 3 completed shifts:  In: 420 [P.O.:420]  Out: 400 [Urine:400]     Labs:   Recent Labs   Lab 07/17/24  0732 07/17/24  0705   ALBUMIN  --  3.1*   HGB 9.7*  --    WBC 13.5*  --      Pressure injury risk assessment:   Sensory Perception: 3-->slightly limited  Moisture: 3-->occasionally moist  Activity: 3-->walks occasionally  Mobility: 3-->slightly limited  Nutrition: 3-->adequate  Friction and Shear: 2-->potential problem  Ajith Score: 17    BRADEN Blanco RN CWOCN  Pager no longer in use, please contact through [employer] group: [facility] [employer]

## 2024-07-17 NOTE — PROGRESS NOTES
07/17/24 1515   Appointment Info   Signing Clinician's Name / Credentials (PT) Spring Miguel PT   Rehab Comments (PT) Patient in bed .Needed coaxing to participate in PT .Walked earlier with OT   Living Environment   People in Home significant other   Current Living Arrangements house   Home Accessibility stairs to enter home   Number of Stairs, Main Entrance 5   Stair Railings, Main Entrance railing on right side (ascending)   Transportation Anticipated family or friend will provide   Self-Care   Usual Activity Tolerance good   Current Activity Tolerance moderate   Equipment Currently Used at Home cane, quad  (for the distance ,has 4Ww at home)   Fall history within last six months no   Activity/Exercise/Self-Care Comment Patient independent with mobility and ADL .Works 3 x week,drives   General Information   Onset of Illness/Injury or Date of Surgery 07/16/24   Referring Physician Bryon Reza   Patient/Family Therapy Goals Statement (PT) return home   Pertinent History of Current Problem (include personal factors and/or comorbidities that impact the POC) Admitted with sepsis,cellulitis R leg   Existing Precautions/Restrictions fall   Weight-Bearing Status - LLE weight-bearing as tolerated   Weight-Bearing Status - RLE weight-bearing as tolerated   Cognition   Affect/Mental Status (Cognition) WFL   Orientation Status (Cognition) oriented x 4   Pain Assessment   Patient Currently in Pain Yes, see Vital Sign flowsheet   Range of Motion (ROM)   Range of Motion ROM deficits secondary to swelling   Strength (Manual Muscle Testing)   Strength (Manual Muscle Testing) Deficits observed during functional mobility   Bed Mobility   Supine-Sit [place] (Bed Mobility) modified independence   Sit-Supine [place] (Bed Mobility) supervision   Assistive Device (Bed Mobility) bed rails  (OOB only)   Transfers   Transfers sit-stand transfer   Transfer Safety Concerns Noted losing balance backward   Sit-Stand Transfer    Sit-Stand [place] (Transfers) contact guard   Assistive Device (Sit-Stand Transfers) cane, quad   Gait/Stairs (Locomotion)   Assistive Device (Gait) cane, quad   Distance in Feet (Gait) 20 feet   Pattern (Gait) step-through   Deviations/Abnormal Patterns (Gait) base of support, wide;natasha decreased   Maintains Weight-bearing Status (Gait) able to maintain   Clinical Impression   Criteria for Skilled Therapeutic Intervention Yes, treatment indicated   PT Diagnosis (PT) impaired functional mobility and safety   Influenced by the following impairments swelling ,weakness   Functional limitations due to impairments strength ,safety awareness,gait ,balance   Clinical Presentation (PT Evaluation Complexity) stable   Clinical Presentation Rationale patient preesnts as med diagnosed   Clinical Decision Making (Complexity) low complexity   Planned Therapy Interventions (PT) bed mobility training;gait training;stair training;transfer training   Risk & Benefits of therapy have been explained evaluation/treatment results reviewed;care plan/treatment goals reviewed;patient;daughter   PT Total Evaluation Time   PT Eval, Low Complexity Minutes (68189) 10   Physical Therapy Goals   PT Frequency 5x/week   PT Predicted Duration/Target Date for Goal Attainment 07/24/24   PT Goals Bed Mobility;Transfers;Gait;Stairs   PT: Bed Mobility Independent;Supine to/from sit   PT: Transfers Modified independent;Sit to/from stand;Assistive device   PT: Gait Supervision/stand-by assist;Quad cane;Greater than 200 feet   PT: Stairs Modified independent;5 stairs;Rail on right   Interventions   Interventions Quick Adds Gait Training   Gait Training   Gait Training Minutes (88592) 8   Symptoms Noted During/After Treatment (Gait Training) fatigue   Treatment Detail/Skilled Intervention educated on proper use of quad cane and safety -to slow down.Waddling type of gait   Distance in Feet 50 feet   [place] Level (Gait Training) contact guard    Physical Assistance Level (Gait Training) supervision;verbal cues   Weight Bearing (Gait Training) weight-bearing as tolerated   Assistive Device (Gait Training) quad cane   Pattern Analysis (Gait Training) swing-through gait   Gait Analysis Deviations decreased step length;increased stride width;decreased toe-to-floor clearance   Impairments (Gait Analysis/Training) flexibility decreased;balance impaired;ROM decreased   PT Discharge Planning   PT Plan safety with transfers and amb with quad cane ,bring short 4WW,bed mobility ,stairs when ready/able   PT Discharge Recommendation (DC Rec) home with assist   PT Rationale for DC Rec was independent priorhas family to assist as needed   PT Brief overview of current status SBA /CGA for mobility nad amb x 50 feet with quad cane   PT Equipment Needed at Discharge cane, quad;other (see comments)  (4Ww)   Total Session Time   Timed Code Treatment Minutes 8   Total Session Time (sum of timed and untimed services) 18

## 2024-07-17 NOTE — CONSULTS
Infectious Disease Consultation:  Requesting MD:  Reason for consult:      HISTORY:  Pt seen by ID multiple times for BLE stasis ulcers/cellulitis but not since 2022.  In with cellulitis again.  .  On vanco.    She is febrile and has some systemic symptoms like diarrhea, chills, acnes also.  She saw wound care yesterday and was put on bactrim.          Encounter Date: 7/15/2024                                                                  Office Visit on 7/15/2024            Detailed Report          Note shared with patient  Additional Documentation    Vitals: /70     Pulse 76     Temp 98.4  F (36.9  C)     Resp 12     Pain Sc Severe Pain (6)          More Vitals   Flowsheets: Outpatient Facility Charge Calculator,     Vitals Reassessment,     VASC Wound,     Lymphedema   Encounter Info: Billing Info,     History,     Allergies,     Detailed Report     Orders Placed    None  Medication Changes      Sulfamethoxazole-Trimethoprim 800-160 MG 1 tablet Oral 2 TIMES DAILY  Medication List  Visit Diagnoses      Ulcer of right lower extremity with fat layer exposed (H)    Acquired lymphedema of leg    Varicose veins with ulcer, right (H)    Venous hypertension of lower extremity, bilateral  Problem List      Pertinent past history, past infectious disease history:    Past Medical History       Past Medical History:   Diagnosis Date    Ankle contracture, left      Class 3 severe obesity due to excess calories without serious comorbidity with body mass index (BMI) of 45.0 to 49.9 in adult (H)      Combined gastric and duodenal ulcer      Controlled substance agreement broken      Depression      Dyslipidemia      Edema      Edema      Gait abnormality      GERD (gastroesophageal reflux disease)      Gout      Lymphedema of both lower extremities      Melanoma (H)       left upper arm    MS (multiple sclerosis) (H)      RLS (restless legs syndrome)      Skin ulcer of left lower leg (H)      Valgus deformity of both  feet      Vitamin D deficiency                 Past Surgical History        Past Surgical History:   Procedure Laterality Date    ABLATION SAPHENOUS VEIN W/ RFA Right 04/12/2021     Saphenous vein, anterior accessory saphenous vein, sclerotherapy of multiple veins.    COSMETIC SURGERY   1988     reduction of excess skin from weight loss    ESOPHAGOSCOPY, GASTROSCOPY, DUODENOSCOPY (EGD), COMBINED N/A 03/30/2015     Procedure: UPPER ENDOSCOPY with gastric biopsy;  Surgeon: Checo Fletcher MD;  Location: [facility];  Service:     IRRIGATION AND DEBRIDEMENT LOWER EXTREMITY, COMBINED Right 3/21/2022     Procedure: RIGHT LEG WOUND DEBRIDEMENT, PAULIE DERMATONE, MISONIX, VAC VERA FLOW WITH VASHE;  Surgeon: Gabriele Bullock MD;  Location: [facility] OR    IRRIGATION AND DEBRIDEMENT LOWER EXTREMITY, COMBINED Right 3/28/2022     Procedure: DEBRIDEMENT AND WOUND DRESSING CHANGE AND MYRIAD APPLICATION OF RIGHT LOWER LEG WOUND;  Surgeon: Gabriele Bullock MD;  Location: [facility] OR    MAMMOPLASTY REDUCTION Bilateral      MOHS MICROGRAPHIC PROCEDURE         left upper arm, melanoma    PICC SINGLE LUMEN PLACEMENT   8/13/2021          PICC SINGLE LUMEN PLACEMENT   9/2/2021          SPINE SURGERY         L5 area surgery, decompression                 Medications:  Reviewed prior to admission meds as applicable in chart review.  Current meds are reviewed in the EMR listed MAR.     ANTIBIOTICS:    Current:V   Prior:   Allergy to:    SH/FH and  travel history(if applicable to consult):  These are reviewed in prior ID consultations and there is no additions  REVIEW OF SYSTEMS:  All other systems negative    EXAMINATION:  BP (!) 149/65 (BP Location: Right arm)   Pulse 76   Temp 98.5  F (36.9  C) (Oral)   Resp 14   Ht 1.524 m (5')   Wt 117.3 kg (258 lb 9.6 oz)   SpO2 97%   BMI 50.50 kg/m    Alert, awake  Vitals tabulated above, reviewed  HEENT:  Neck supple without lymphadenopathy  Sclera clear  CARDIOVASCULAR regular rate and rhythm, no  "murmur  Lungs CLEAR TO AUSCULTATION   Abdomen soft, NT/ND, absent HEPATOSPLENOMEGALY  Skin normal  Joints normal  Neurologic exam non focal  Wound:  NA        CLINICAL DATABASE FOR---LAB/MICRO/CULTURES/IMAGING STUDIES:  Lab Results   Component Value Date    WBC 13.5 07/17/2024     Lab Results   Component Value Date    RBC 3.51 07/17/2024     Lab Results   Component Value Date    HGB 9.7 07/17/2024     Lab Results   Component Value Date    HCT 31.0 07/17/2024     No components found for: \"MCT\"  Lab Results   Component Value Date    MCV 88 07/17/2024     Lab Results   Component Value Date    MCH 27.6 07/17/2024     Lab Results   Component Value Date    MCHC 31.3 07/17/2024     Lab Results   Component Value Date    RDW 15.6 07/17/2024     Lab Results   Component Value Date     07/17/2024       Last Comprehensive Metabolic Panel:  Sodium   Date Value Ref Range Status   07/17/2024 142 135 - 145 mmol/L Final     Potassium   Date Value Ref Range Status   07/17/2024 4.5 3.4 - 5.3 mmol/L Final   06/24/2022 4.4 3.5 - 5.0 mmol/L Final     Chloride   Date Value Ref Range Status   07/17/2024 106 98 - 107 mmol/L Final   06/24/2022 100 98 - 107 mmol/L Final     Carbon Dioxide (CO2)   Date Value Ref Range Status   07/17/2024 23 22 - 29 mmol/L Final   06/24/2022 25 22 - 31 mmol/L Final     Anion Gap   Date Value Ref Range Status   07/17/2024 13 7 - 15 mmol/L Final   06/24/2022 14 5 - 18 mmol/L Final     Glucose   Date Value Ref Range Status   07/17/2024 99 70 - 99 mg/dL Final   06/24/2022 95 70 - 125 mg/dL Final     GLUCOSE BY METER POCT   Date Value Ref Range Status   07/17/2024 97 70 - 99 mg/dL Final     Urea Nitrogen   Date Value Ref Range Status   07/17/2024 16.8 8.0 - 23.0 mg/dL Final   06/24/2022 16 8 - 28 mg/dL Final     Creatinine   Date Value Ref Range Status   07/17/2024 0.97 (H) 0.51 - 0.95 mg/dL Final     GFR Estimate   Date Value Ref Range Status   07/17/2024 60 (L) >60 mL/min/1.73m2 Final     Comment:     eGFR " calculated using 2021 CKD-EPI equation.   09/26/2020 >60 >60 mL/min/1.73m2 Final     Calcium   Date Value Ref Range Status   07/17/2024 8.0 (L) 8.8 - 10.4 mg/dL Final     Comment:     Reference intervals for this test were updated on 7/16/2024 to reflect our healthy population more accurately. There may be differences in the flagging of prior results with similar values performed with this method. Those prior results can be interpreted in the context of the updated reference intervals.       Liver Function Studies -   Recent Labs   Lab Test 07/17/24  0705   PROTTOTAL 5.5*   ALBUMIN 3.1*   BILITOTAL 0.2   ALKPHOS 135   AST 44   ALT 34       Erythrocyte Sedimentation Rate   Date Value Ref Range Status   10/20/2021 55 (H) 0 - 20 mm/hr Final       CRP   Date Value Ref Range Status   10/11/2021 2.8 (H) 0.0-<0.8 mg/dL Final     C-Reactive Protein High Sensitivity   Date Value Ref Range Status   10/20/2021 31.7 (H) 0.0 - 3.0 mg/L Final     Comment:     Low risk < 1.0 mg/L  Average risk 1.0 to 3.0 mg/L  High risk > 3.0 mg/L  Acute inflamation > 10.0 mg/L           Micro pending    IMPRESSION:  Cellulitis RLE  PLAN:  Vanco.  Problem typically one of wax/wane almost never resolution  Previously had care at [place] for this issue  Will follow.          SUMA LOCKWOOD MD  [facility]  Office [phone]

## 2024-07-17 NOTE — PLAN OF CARE
Problem: Pain Acute  Goal: Optimal Pain Control and Function  Outcome: Progressing  Intervention: Develop Pain Management Plan  Recent Flowsheet Documentation  Taken 7/17/2024 1826 by Jocy Reeves RN  Pain Management Interventions: medication (see MAR)  Intervention: Prevent or Manage Pain  Recent Flowsheet Documentation  Taken 7/17/2024 1630 by Jocy Reeves RN  Medication Review/Management: medications reviewed  Taken 7/17/2024 0800 by Jocy Reeves RN  Medication Review/Management: medications reviewed   Goal Outcome Evaluation:         Pt AXO able to make her needs known. Pt'S pain was controlled with prn tylenol which is effective. Pt's lymph wraps were changed today. Pt just c/o chills and fever, checked the Pt's temp and it was 98.0, warm blanket was given to Pt. Will continue to monitor.

## 2024-07-17 NOTE — PLAN OF CARE
Problem: Infection  Goal: Absence of Infection Signs and Symptoms  Outcome: Progressing  Intervention: Prevent or Manage Infection  Recent Flowsheet Documentation  Taken 7/17/2024 0052 by Geno Richards RN  Isolation Precautions:   enteric precautions maintained   contact precautions maintained     Problem: Comorbidity Management  Goal: Blood Pressure in Desired Range  Outcome: Progressing  Intervention: Maintain Blood Pressure Management  Recent Flowsheet Documentation  Taken 7/17/2024 0052 by Geno Richards RN  Medication Review/Management: medications reviewed   Goal Outcome Evaluation:        Patient is A/O x4. Reports pain 2-3/10 patient reports that this is manageable and refused pain medications for most of the shift. This morning patient reported a headache and requested tylenol gave prn medication.  VSS on RA. Legs wrapped with lymph wraps. No acute events overnight. Call light within reach and bed alarm activated.

## 2024-07-18 ENCOUNTER — APPOINTMENT (OUTPATIENT)
Dept: PHYSICAL THERAPY | Facility: HOSPITAL | Age: 79
DRG: 872 | End: 2024-07-18
Payer: COMMERCIAL

## 2024-07-18 LAB
BACTERIA UR CULT: NORMAL
BASOPHILS # BLD AUTO: 0 10E3/UL (ref 0–0.2)
BASOPHILS NFR BLD AUTO: 0 %
CREAT SERPL-MCNC: 0.84 MG/DL (ref 0.51–0.95)
EGFRCR SERPLBLD CKD-EPI 2021: 72 ML/MIN/1.73M2
EOSINOPHIL # BLD AUTO: 0.1 10E3/UL (ref 0–0.7)
EOSINOPHIL NFR BLD AUTO: 1 %
ERYTHROCYTE [DISTWIDTH] IN BLOOD BY AUTOMATED COUNT: 15.5 % (ref 10–15)
GLUCOSE BLDC GLUCOMTR-MCNC: 114 MG/DL (ref 70–99)
GLUCOSE BLDC GLUCOMTR-MCNC: 119 MG/DL (ref 70–99)
HCT VFR BLD AUTO: 32.1 % (ref 35–47)
HGB BLD-MCNC: 9.9 G/DL (ref 11.7–15.7)
IMM GRANULOCYTES # BLD: 0 10E3/UL
IMM GRANULOCYTES NFR BLD: 0 %
LYMPHOCYTES # BLD AUTO: 0.8 10E3/UL (ref 0.8–5.3)
LYMPHOCYTES NFR BLD AUTO: 8 %
MCH RBC QN AUTO: 26.6 PG (ref 26.5–33)
MCHC RBC AUTO-ENTMCNC: 30.8 G/DL (ref 31.5–36.5)
MCV RBC AUTO: 86 FL (ref 78–100)
MONOCYTES # BLD AUTO: 0.6 10E3/UL (ref 0–1.3)
MONOCYTES NFR BLD AUTO: 6 %
NEUTROPHILS # BLD AUTO: 8.8 10E3/UL (ref 1.6–8.3)
NEUTROPHILS NFR BLD AUTO: 85 %
NRBC # BLD AUTO: 0 10E3/UL
NRBC BLD AUTO-RTO: 0 /100
PLAT MORPH BLD: NORMAL
PLATELET # BLD AUTO: 196 10E3/UL (ref 150–450)
RBC # BLD AUTO: 3.72 10E6/UL (ref 3.8–5.2)
RBC MORPH BLD: NORMAL
WBC # BLD AUTO: 10.3 10E3/UL (ref 4–11)

## 2024-07-18 PROCEDURE — 258N000003 HC RX IP 258 OP 636: Performed by: INTERNAL MEDICINE

## 2024-07-18 PROCEDURE — 250N000013 HC RX MED GY IP 250 OP 250 PS 637: Performed by: STUDENT IN AN ORGANIZED HEALTH CARE EDUCATION/TRAINING PROGRAM

## 2024-07-18 PROCEDURE — 120N000001 HC R&B MED SURG/OB

## 2024-07-18 PROCEDURE — 97116 GAIT TRAINING THERAPY: CPT | Mod: GP

## 2024-07-18 PROCEDURE — 85049 AUTOMATED PLATELET COUNT: CPT | Performed by: INTERNAL MEDICINE

## 2024-07-18 PROCEDURE — 250N000013 HC RX MED GY IP 250 OP 250 PS 637: Performed by: INTERNAL MEDICINE

## 2024-07-18 PROCEDURE — 999N000147 HC STATISTIC PT IP EVAL DEFER: Performed by: PHYSICAL THERAPIST

## 2024-07-18 PROCEDURE — 250N000011 HC RX IP 250 OP 636: Performed by: INTERNAL MEDICINE

## 2024-07-18 PROCEDURE — 999N000127 HC STATISTIC PERIPHERAL IV START W US GUIDANCE

## 2024-07-18 PROCEDURE — 99233 SBSQ HOSP IP/OBS HIGH 50: CPT | Performed by: INTERNAL MEDICINE

## 2024-07-18 PROCEDURE — 36415 COLL VENOUS BLD VENIPUNCTURE: CPT | Performed by: INTERNAL MEDICINE

## 2024-07-18 PROCEDURE — 82565 ASSAY OF CREATININE: CPT | Performed by: STUDENT IN AN ORGANIZED HEALTH CARE EDUCATION/TRAINING PROGRAM

## 2024-07-18 PROCEDURE — 99232 SBSQ HOSP IP/OBS MODERATE 35: CPT | Performed by: INTERNAL MEDICINE

## 2024-07-18 RX ORDER — CEFAZOLIN SODIUM 1 G/50ML
1250 SOLUTION INTRAVENOUS EVERY 24 HOURS
Status: DISCONTINUED | OUTPATIENT
Start: 2024-07-18 | End: 2024-07-20

## 2024-07-18 RX ADMIN — Medication 250 MG: at 20:49

## 2024-07-18 RX ADMIN — SODIUM CHLORIDE 1250 MG: 9 INJECTION, SOLUTION INTRAVENOUS at 15:42

## 2024-07-18 RX ADMIN — ZOLPIDEM TARTRATE 5 MG: 5 TABLET ORAL at 21:38

## 2024-07-18 RX ADMIN — BUPROPION HYDROCHLORIDE 150 MG: 150 TABLET, FILM COATED, EXTENDED RELEASE ORAL at 08:05

## 2024-07-18 RX ADMIN — GABAPENTIN 100 MG: 100 CAPSULE ORAL at 13:47

## 2024-07-18 RX ADMIN — ROPINIROLE HYDROCHLORIDE 1 MG: 1 TABLET, FILM COATED ORAL at 20:48

## 2024-07-18 RX ADMIN — SPIRONOLACTONE 25 MG: 25 TABLET, FILM COATED ORAL at 08:06

## 2024-07-18 RX ADMIN — PANTOPRAZOLE SODIUM 40 MG: 40 TABLET, DELAYED RELEASE ORAL at 08:05

## 2024-07-18 RX ADMIN — GABAPENTIN 100 MG: 100 CAPSULE ORAL at 20:48

## 2024-07-18 RX ADMIN — GABAPENTIN 100 MG: 100 CAPSULE ORAL at 08:05

## 2024-07-18 RX ADMIN — CITALOPRAM 40 MG: 40 TABLET ORAL at 13:47

## 2024-07-18 RX ADMIN — ASPIRIN 81 MG CHEWABLE TABLET 81 MG: 81 TABLET CHEWABLE at 08:05

## 2024-07-18 RX ADMIN — ACETAMINOPHEN 975 MG: 325 TABLET ORAL at 20:49

## 2024-07-18 RX ADMIN — Medication 250 MG: at 08:06

## 2024-07-18 RX ADMIN — ACETAMINOPHEN 975 MG: 325 TABLET ORAL at 13:48

## 2024-07-18 RX ADMIN — ENOXAPARIN SODIUM 40 MG: 40 INJECTION SUBCUTANEOUS at 21:43

## 2024-07-18 RX ADMIN — SIMVASTATIN 40 MG: 40 TABLET, FILM COATED ORAL at 20:49

## 2024-07-18 RX ADMIN — ACETAMINOPHEN 975 MG: 325 TABLET ORAL at 08:05

## 2024-07-18 RX ADMIN — ENOXAPARIN SODIUM 40 MG: 40 INJECTION SUBCUTANEOUS at 11:36

## 2024-07-18 RX ADMIN — ROPINIROLE HYDROCHLORIDE 0.5 MG: 0.25 TABLET, FILM COATED ORAL at 08:05

## 2024-07-18 ASSESSMENT — ACTIVITIES OF DAILY LIVING (ADL)
ADLS_ACUITY_SCORE: 33
ADLS_ACUITY_SCORE: 32
ADLS_ACUITY_SCORE: 35
ADLS_ACUITY_SCORE: 33
ADLS_ACUITY_SCORE: 33
ADLS_ACUITY_SCORE: 32
ADLS_ACUITY_SCORE: 35
ADLS_ACUITY_SCORE: 33
ADLS_ACUITY_SCORE: 35
ADLS_ACUITY_SCORE: 33
ADLS_ACUITY_SCORE: 33
ADLS_ACUITY_SCORE: 35
ADLS_ACUITY_SCORE: 35
ADLS_ACUITY_SCORE: 33
ADLS_ACUITY_SCORE: 33

## 2024-07-18 NOTE — CONSULTS
"SPIRITUAL HEALTH SERVICES - Standard Note     [facility] P1     Summary and Recommendations -     Pt Carolina welcomed a visit. She spoke about her strong karri, loving family, and her feelings during hospitalization.       Plan: [facility] available upon request.          Kelley Kirk Mdiv '26   Intern      [facility] available 24/7 for emergent requests/referrals, either by paging the on-call  or by entering an ASAP/STAT consult in Epic (this will also page the on-call ).\          Assessment      Saw pt Elizabeth JODY Kelley per consult order from staff.          Patient/Family Understanding of Illness and Goals of Care - Carolina spoke about her hospitalization and said that after her \"busy day\" she is feeling less anxious and physically feeling better. She would like to continue to work, but does not know if that will be possible.         Distress and Loss - Carolina mentioned she has been feeling anxious and isolated during her hospitalization.         Strengths, Coping, and Resources - Carolina feels well supported by staff and her daughter, Carolyn. She also prays often and feels a strong connection to God.         Meaning, Beliefs, and Spirituality - Carolina was raised [employer],, but now attend [employer] in [place]. Carolina spoke at length about her love for Oriental orthodox music and attending [employer]. She loves \"Oriental orthodox tradition\" and we prayed together at the end of the visit.      Time spent with patient: 40 min     "

## 2024-07-18 NOTE — PROGRESS NOTES
[facility]    Medicine Progress Note - Hospitalist Service    Date of Admission:  7/16/2024    Assessment & Plan         Elizabeth Kelley is a 78 year old female admitted on 7/16/2024. She presented to the ER with complaint of fever, fatigue, body aches and diarrhea since yesterday and wound check, in the ER patient was again seen to be having a fever 102, labs significant for leukocytosis, blood cultures were drawn, patient received vancomycin and fluids in the ED and is going to be admitted for acute cellulitis of the right lower extremity and sepsis.       7/17 :     Fevers improving  Blood cultures negative  Continue iv vancomycin  ID following  WOC consulted    Creatinine normalized  Stop iv fluids    Ordered pt/ot evaluation    Not medically ready for discharge at this time.  Another 2 days, needs more clinical improvement      A/p :     Problem list and plan:  Acute cellulitis of the right lower extremity  Sepsis(fever, mild KIKE, leukocytosis)  Diarrhea probably antibiotic associated  Patient presented to the ER with complaint of fever, fatigue, body aches and diarrhea since yesterday and wound check   Patient also experienced nausea associated with lightheadedness and few episodes of diarrhea and felt dehydrated  She was also endorsing open wounds to both legs with lymphedema which has been present for a few years and patient has been seen by vascular surgery and has had to deal with multiple infection of these wounds  Patient also was started on a new antibiotic possibly Bactrim yesterday for wound infection  Again was seen febrile with a fever of 102 in the ED  Labs significant for mildly elevated creatinine at 1.08 with a baseline of around below 1 along with leukocytosis  Monitor fever and WBC curve  Blood and urine cultures were drawn currently pending results  Urine analysis negative for acute process  Viral panel negative for COVID, flu, respiratory syncytial virus  Chest x-ray  negative for acute process  Patient received vancomycin and fluids in the ED, will continue with same  Monitor fever and WBC curve  Continue Tylenol for pain and fever  Enteric bacterial viral panel and C. difficile of the stool ordered to rule out infectious etiology of diarrhea  Gentle IV hydration  Holding Lasix for now which patient takes for lymphedema, monitor volume status closely, on gentle IV hydration, restart Lasix tomorrow as appropriate  ID consulted, follow-up recommendations     Mild KIKE  Baseline creatinine below 1  Current creatinine 1.08  Gentle IV hydration  Monitor renal function closely     Normocytic anemia  Monitor CBC, follow-up iron panel     History of mood disorder  Continue with bupropion, citalopram     History of lymphedema of the bilateral lower extremities  Neuropathy  Restless leg syndrome  Holding Lasix for now, may restart tomorrow  Continue with spironolactone  Continue with gabapentin  Continue with ropinirole     History of hyperlipidemia  Continue with aspirin and atorvastatin     Physical deconditioning/weakness  Fall precautions  PT and OT evaluation          Diet: Combination Diet Regular Diet Adult    DVT Prophylaxis: Enoxaparin (Lovenox) SQ  Amato Catheter: Not present  Lines: None     Cardiac Monitoring: None  Code Status: Full Code      Clinically Significant Risk Factors              # Hypoalbuminemia: Lowest albumin = 3.1 g/dL at 7/17/2024  7:05 AM, will monitor as appropriate     # Hypertension: Noted on problem list              # Severe Obesity: Estimated body mass index is 50.5 kg/m  as calculated from the following:    Height as of this encounter: 1.524 m (5').    Weight as of this encounter: 117.3 kg (258 lb 9.6 oz)., PRESENT ON ADMISSION     # Financial/Environmental Concerns: none         Disposition Plan     Medically Ready for Discharge: Anticipated in 2-4 Days             Bryon Reza MD  Hospitalist Service  [facility]  Securely  message with Ez (more info)  Text page via [facility] Paging/Directory   ______________________________________________________________________         GENERAL: The patient is not in any acute distressed. Awake and alert.  HEENT: Nonicteric sclerae, PERRLA, EOMI. Oropharynx clear. Moist mucous membranes. Conjunctivae appear well perfused.  HEART: Regular rate and rhythm without murmurs.  LUNGS: Clear to auscultation bilaterally. No wheezing or crackles.  ABDOMEN: Soft, positive bowel sounds, nontender.  SKIN: No rash, no excessive bruising, petechiae, or purpura.  EXTREMITIES : Bilateral lower extremity lymphedema, right more than the left , Right lower extremity red, swollen, tender   NEUROLOGIC: conscious and oriented, follows commands, no obvious focal deficits.  ROS: All other systems negative       Physical Exam   Vital Signs: Temp: 98.6  F (37  C) Temp src: Oral BP: (!) 167/72 Pulse: 68   Resp: 18 SpO2: 94 % O2 Device: None (Room air)    Weight: 258 lbs 9.59 oz              Medical Decision Making       60 MINUTES SPENT BY ME on the date of service doing chart review, history, exam, documentation & further activities per the note.  MANAGEMENT DISCUSSED with the following over the past 24 hours: rn, patient, family       Data     I have personally reviewed the following data over the past 24 hrs:    13.5 (H)  \   9.7 (L)   / 194     142 106 16.8 /  137 (H)   4.5 23 0.97 (H) \     ALT: 34 AST: 44 AP: 135 TBILI: 0.2   ALB: 3.1 (L) TOT PROTEIN: 5.5 (L) LIPASE: N/A     Ferritin:  N/A % Retic:  N/A LDH:  N/A

## 2024-07-18 NOTE — PROGRESS NOTES
Care Management Follow Up    Length of Stay (days): 2    Expected Discharge Date: 07/19/2024     Concerns to be Addressed:     final antibiotic plan, discharge planning  Patient plan of care discussed at interdisciplinary rounds: Yes    Anticipated Discharge Disposition: Home     Anticipated Discharge Services:  wound care (OP?)  Anticipated Discharge DME:  NA    Patient/family educated on Medicare website which has current facility and service quality ratings:  NA  Education Provided on the Discharge Plan:  per care team  Patient/Family in Agreement with the Plan:  yes, per care team    Referrals Placed by CM/SW:  none at this time  Private pay costs discussed: Not applicable    Additional Information:  Await final ID plan.  Looks like ID is setting up wound care OP.  CM will continue to monitor medical progression and aid in discharge planning as needed.     Social Hx: Pt lives in a house with her S/O, Jaleel . Pt uses cane for ambulation. Pt is independent at baseline, works part time, and drives. Anticipate discharge home. Family to transport.     Vanadna Grewal RN

## 2024-07-18 NOTE — PLAN OF CARE
Goal Outcome Evaluation:             Pt's PIV's were bad. Pt a very hard poke, said only ultra sound works. PICC paged and new PIV place, Vanco started.

## 2024-07-18 NOTE — PROGRESS NOTES
Lymphedema: Orders received. Chart reviewed and discussed with care team.? Lymphedema therapy not indicated due to patient and family declining. Per patient's daughter, patient is currently tolerating compression well and do not want to add any additional compression. Patient to continue to follow up with vascular. Defer discharge recommendations to medical team.? Will complete orders.     Juanita Gray, PT  7/18/2024

## 2024-07-18 NOTE — PLAN OF CARE
Physical Therapy Discharge Summary    Reason for therapy discharge:    Patient/family request discontinuation of services.    Progress towards therapy goal(s). See goals on Care Plan in [facility] electronic health record for goal details.  Goals partially met.  Barriers to achieving goals:   none.    Therapy recommendation(s):    Continue ambulation with nursing staff

## 2024-07-18 NOTE — PROGRESS NOTES
[facility]    Medicine Progress Note - Hospitalist Service    Date of Admission:  7/16/2024    Assessment & Plan         Elizabeth Kelley is a 78 year old female admitted on 7/16/2024.     She presented to the ER with complaint of fever, fatigue, body aches and diarrhea since yesterday and wound check.  In the ER patient was again seen to be having a fever 102, labs significant for leukocytosis, blood cultures were drawn.  Patient received vancomycin and fluids in the ED  She has been admitted for acute cellulitis of the right lower extremity and sepsis.         7/17 :     Fevers improving  Blood cultures negative  Continue iv vancomycin  ID following  WOC consulted      Creatinine normalized  Stop iv fluids    Ordered pt/ot evaluation    Not medically ready for discharge at this time.  Another 2 days, needs more clinical improvement        7/18 :       Spiked fever 1 time - last night  at 11 pm - 100.5  Blood cultures 7/16 : NGTD  Urine culture 7/16 : mixed trang  Continue iv vancomycin  ID following  WOC consulted  Check venous ultrasound of legs : pending    Creatinine normalized  Stop iv fluids    Ordered pt/ot evaluation : home with home OT    Not medically ready for discharge at this time.  Discharge tomorrow on oral antibiotic if no fever        A/p :     Problem list and plan:      Acute cellulitis of the right lower extremity  Sepsis(fever, mild KIKE, leukocytosis)          Patient presented to the ER with complaint of fever, fatigue, body aches and diarrhea since yesterday and wound check   Patient also experienced nausea associated with lightheadedness and few episodes of diarrhea and felt dehydrated  She was also endorsing open wounds to both legs with lymphedema which has been present for a few years and patient has been seen by vascular surgery and has had to deal with multiple infection of these wounds  Patient also was started on a new antibiotic possibly Bactrim yesterday for  wound infection  Again was seen febrile with a fever of 102 in the ED    Blood and urine cultures were drawn currently pending results  Urine analysis negative for acute process  Viral panel negative for COVID, flu, respiratory syncytial virus  Chest x-ray negative for acute process  Patient received vancomycin and fluids in the ED, will continue with same  ID consulted, follow-up recommendations        Diarrhea probably antibiotic associated :     Enteric bacterial viral panel and C. difficile of the stool ordered to rule out infectious etiology of diarrhea       Mild KIKE  Baseline creatinine below 1  Current creatinine 1.08  Gentle IV hydration  Monitor renal function closely       Normocytic anemia  Monitor CBC, follow-up iron panel     History of mood disorder  Continue with bupropion, citalopram     History of lymphedema of the bilateral lower extremities  Neuropathy  Restless leg syndrome  Holding Lasix for now, may restart tomorrow  Continue with spironolactone  Continue with gabapentin  Continue with ropinirole     History of hyperlipidemia  Continue with aspirin and atorvastatin     Physical deconditioning/weakness  Fall precautions  PT and OT evaluation          Diet: Combination Diet Regular Diet Adult    DVT Prophylaxis: Enoxaparin (Lovenox) SQ  Amato Catheter: Not present  Lines: None     Cardiac Monitoring: None  Code Status: Full Code      Clinically Significant Risk Factors              # Hypoalbuminemia: Lowest albumin = 3.1 g/dL at 7/17/2024  7:05 AM, will monitor as appropriate     # Hypertension: Noted on problem list              # Severe Obesity: Estimated body mass index is 50.5 kg/m  as calculated from the following:    Height as of this encounter: 1.524 m (5').    Weight as of this encounter: 117.3 kg (258 lb 9.6 oz)., PRESENT ON ADMISSION       # Financial/Environmental Concerns: none         Disposition Plan     Medically Ready for Discharge: Anticipated in 2-4 Days             Bryon  MD Libia  Hospitalist Service  [facility]  Securely message with [employer] (more info)  Text page via [employer] Paging/Directory   ______________________________________________________________________         GENERAL: The patient is not in any acute distressed. Awake and alert.  HEENT: Nonicteric sclerae, PERRLA, EOMI. Oropharynx clear. Moist mucous membranes. Conjunctivae appear well perfused.  HEART: Regular rate and rhythm without murmurs.  LUNGS: Clear to auscultation bilaterally. No wheezing or crackles.  ABDOMEN: Soft, positive bowel sounds, nontender.  SKIN: No rash, no excessive bruising, petechiae, or purpura.  EXTREMITIES : Bilateral lower extremity lymphedema, right more than the left , Right lower extremity red, swollen, tender   NEUROLOGIC: conscious and oriented, follows commands, no obvious focal deficits.  ROS: All other systems negative       Physical Exam   Vital Signs: Temp: 98.5  F (36.9  C) Temp src: Oral BP: 125/62 Pulse: 84   Resp: 20 SpO2: 92 % O2 Device: None (Room air)    Weight: 258 lbs 9.59 oz              Medical Decision Making       60 MINUTES SPENT BY ME on the date of service doing chart review, history, exam, documentation & further activities per the note.  MANAGEMENT DISCUSSED with the following over the past 24 hours: rn, patient, family       Data     I have personally reviewed the following data over the past 24 hrs:    N/A  \   N/A   / N/A     N/A N/A N/A /  114 (H)   N/A N/A 0.84 \

## 2024-07-18 NOTE — PROGRESS NOTES
"[facility] Progress Note    SUBJECTIVE:  Daughter here.  Pt with low grade temp.  She has a dressing now I've not seen before of the R leg, like a much nicer UNNA boot type device.  Seems to be very good to me.  L leg mostly has Kerlix.  WOC note appreciated.        REVIEW OF SYSTEMS:  Negative unless as listed above.  Social history, Family history, Medications: reviewed.    OBJECTIVE:  /62 (BP Location: Right arm)   Pulse 84   Temp 98.5  F (36.9  C) (Oral)   Resp 20   Ht 1.524 m (5')   Wt 117.3 kg (258 lb 9.6 oz)   SpO2 92%   BMI 50.50 kg/m                PHYSICAL EXAM:  Alert, awake  Vitals tabulated above, reviewed  Neck supple without lymphadenopathy  Sclera normal color, not injected  CARDIOVASCULAR regular rate and rhythm, no murmur  Lungs CLEAR TO AUSCULTATION   Abdomen soft, NT/ND, absent HEPATOSPLENOMEGALY  Skin normal  Joints normal  Neurologic exam non focal                          Antibiotics:    Pertinent labs:    Recent Labs   Lab Test 07/18/24  0912 07/17/24  0732 07/16/24  1358   WBC 10.3 13.5* 15.6*   HGB 9.9* 9.7* 11.1*   HCT 32.1* 31.0* 35.5   MCV 86 88 87    194 242       Lab Results   Component Value Date    RBC 3.72 07/18/2024     Lab Results   Component Value Date    HGB 9.9 07/18/2024     Lab Results   Component Value Date    HCT 32.1 07/18/2024     No components found for: \"MCT\"  Lab Results   Component Value Date    MCV 86 07/18/2024     Lab Results   Component Value Date    MCH 26.6 07/18/2024     Lab Results   Component Value Date    MCHC 30.8 07/18/2024     Lab Results   Component Value Date    RDW 15.5 07/18/2024     Lab Results   Component Value Date     07/18/2024       Last Comprehensive Metabolic Panel:  Sodium   Date Value Ref Range Status   07/17/2024 142 135 - 145 mmol/L Final     Potassium   Date Value Ref Range Status   07/17/2024 4.5 3.4 - 5.3 mmol/L Final   06/24/2022 4.4 3.5 - 5.0 mmol/L Final     Chloride   Date Value Ref Range " Status   07/17/2024 106 98 - 107 mmol/L Final   06/24/2022 100 98 - 107 mmol/L Final     Carbon Dioxide (CO2)   Date Value Ref Range Status   07/17/2024 23 22 - 29 mmol/L Final   06/24/2022 25 22 - 31 mmol/L Final     Anion Gap   Date Value Ref Range Status   07/17/2024 13 7 - 15 mmol/L Final   06/24/2022 14 5 - 18 mmol/L Final     Glucose   Date Value Ref Range Status   06/24/2022 95 70 - 125 mg/dL Final     GLUCOSE BY METER POCT   Date Value Ref Range Status   07/18/2024 114 (H) 70 - 99 mg/dL Final     Urea Nitrogen   Date Value Ref Range Status   07/17/2024 16.8 8.0 - 23.0 mg/dL Final   06/24/2022 16 8 - 28 mg/dL Final     Creatinine   Date Value Ref Range Status   07/18/2024 0.84 0.51 - 0.95 mg/dL Final     GFR Estimate   Date Value Ref Range Status   07/18/2024 72 >60 mL/min/1.73m2 Final     Comment:     eGFR calculated using 2021 CKD-EPI equation.   09/26/2020 >60 >60 mL/min/1.73m2 Final     Calcium   Date Value Ref Range Status   07/17/2024 8.0 (L) 8.8 - 10.4 mg/dL Final     Comment:     Reference intervals for this test were updated on 7/16/2024 to reflect our healthy population more accurately. There may be differences in the flagging of prior results with similar values performed with this method. Those prior results can be interpreted in the context of the updated reference intervals.       Liver Function Studies -   Recent Labs   Lab Test 07/17/24  0705   PROTTOTAL 5.5*   ALBUMIN 3.1*   BILITOTAL 0.2   ALKPHOS 135   AST 44   ALT 34       Erythrocyte Sedimentation Rate   Date Value Ref Range Status   10/20/2021 55 (H) 0 - 20 mm/hr Final       CRP   Date Value Ref Range Status   10/11/2021 2.8 (H) 0.0-<0.8 mg/dL Final     C-Reactive Protein High Sensitivity   Date Value Ref Range Status   10/20/2021 31.7 (H) 0.0 - 3.0 mg/L Final     Comment:     Low risk < 1.0 mg/L  Average risk 1.0 to 3.0 mg/L  High risk > 3.0 mg/L  Acute inflamation > 10.0 mg/L               MICROBIOLOGY  DATA:        IMAGING/RADIOLOGY:          ASSESSMENT:  Stasis BLE  Doubt has infection (or much of one)    RECOMMENDATION:  Same  If fever down get out tomorrow on doxy  She's seen CORY Junior RN in past, I'll message her.  She needs local wound care set up.  [place] would be perfect.      My time today was 30 min and most of that was discussion with pt and daughter, then coordination of her care plan with other providers, specifically vascular clinic member.   SUMA Macias MD  Office [phone] option 2 to desk staff

## 2024-07-18 NOTE — PLAN OF CARE
Problem: Adult Inpatient Plan of Care  Goal: Absence of Hospital-Acquired Illness or Injury  Outcome: Progressing  Intervention: Identify and Manage Fall Risk  Recent Flowsheet Documentation  Taken 7/17/2024 1945 by Speedy Ortiz RN  Safety Promotion/Fall Prevention:   room door open   room near nurse's station  Intervention: Prevent Skin Injury  Recent Flowsheet Documentation  Taken 7/17/2024 1945 by Speedy Ortiz RN  Body Position: position changed independently  Intervention: Prevent and Manage VTE (Venous Thromboembolism) Risk  Recent Flowsheet Documentation  Taken 7/17/2024 1945 by Speedy Ortiz RN  VTE Prevention/Management: (lymph wraps on) SCDs off (sequential compression devices)  Intervention: Prevent Infection  Recent Flowsheet Documentation  Taken 7/17/2024 1945 by Speedy Ortiz RN  Infection Prevention:   hand hygiene promoted   single patient room provided   other (see comments)     Problem: Pain Acute  Goal: Optimal Pain Control and Function  Outcome: Progressing  Intervention: Develop Pain Management Plan  Recent Flowsheet Documentation  Taken 7/17/2024 1944 by Speedy Ortiz RN  Pain Management Interventions:   medication (see MAR)   emotional support  Intervention: Prevent or Manage Pain  Recent Flowsheet Documentation  Taken 7/17/2024 1945 by Speedy Ortiz RN  Medication Review/Management: medications reviewed   Goal Outcome Evaluation:       A&Ox4. Anxious. Requesting to speak to a . Spiritual consult placed. Having 5/10 headache and BLE pain, gave IV dilaudid. Temp of 100.2 F, tylenol was given. Temp recheck was 98.6. On remote tele, NSR. BLE lymphedema wraps on.

## 2024-07-19 ENCOUNTER — APPOINTMENT (OUTPATIENT)
Dept: RADIOLOGY | Facility: HOSPITAL | Age: 79
DRG: 872 | End: 2024-07-19
Attending: INTERNAL MEDICINE
Payer: COMMERCIAL

## 2024-07-19 ENCOUNTER — APPOINTMENT (OUTPATIENT)
Dept: ULTRASOUND IMAGING | Facility: HOSPITAL | Age: 79
DRG: 872 | End: 2024-07-19
Attending: INTERNAL MEDICINE
Payer: COMMERCIAL

## 2024-07-19 LAB
CREAT SERPL-MCNC: 0.84 MG/DL (ref 0.51–0.95)
EGFRCR SERPLBLD CKD-EPI 2021: 72 ML/MIN/1.73M2
GLUCOSE BLDC GLUCOMTR-MCNC: 114 MG/DL (ref 70–99)
GLUCOSE BLDC GLUCOMTR-MCNC: 115 MG/DL (ref 70–99)
GLUCOSE BLDC GLUCOMTR-MCNC: 118 MG/DL (ref 70–99)
NT-PROBNP SERPL-MCNC: 774 PG/ML (ref 0–1800)
PROCALCITONIN SERPL IA-MCNC: 1.99 NG/ML

## 2024-07-19 PROCEDURE — 99232 SBSQ HOSP IP/OBS MODERATE 35: CPT | Performed by: INTERNAL MEDICINE

## 2024-07-19 PROCEDURE — 258N000003 HC RX IP 258 OP 636: Performed by: INTERNAL MEDICINE

## 2024-07-19 PROCEDURE — 250N000013 HC RX MED GY IP 250 OP 250 PS 637: Performed by: STUDENT IN AN ORGANIZED HEALTH CARE EDUCATION/TRAINING PROGRAM

## 2024-07-19 PROCEDURE — 36415 COLL VENOUS BLD VENIPUNCTURE: CPT | Performed by: STUDENT IN AN ORGANIZED HEALTH CARE EDUCATION/TRAINING PROGRAM

## 2024-07-19 PROCEDURE — 84145 PROCALCITONIN (PCT): CPT | Performed by: INTERNAL MEDICINE

## 2024-07-19 PROCEDURE — 83880 ASSAY OF NATRIURETIC PEPTIDE: CPT | Performed by: INTERNAL MEDICINE

## 2024-07-19 PROCEDURE — G0463 HOSPITAL OUTPT CLINIC VISIT: HCPCS | Mod: 25

## 2024-07-19 PROCEDURE — 93970 EXTREMITY STUDY: CPT

## 2024-07-19 PROCEDURE — 250N000011 HC RX IP 250 OP 636: Performed by: INTERNAL MEDICINE

## 2024-07-19 PROCEDURE — 71045 X-RAY EXAM CHEST 1 VIEW: CPT

## 2024-07-19 PROCEDURE — 82565 ASSAY OF CREATININE: CPT | Performed by: STUDENT IN AN ORGANIZED HEALTH CARE EDUCATION/TRAINING PROGRAM

## 2024-07-19 PROCEDURE — 99233 SBSQ HOSP IP/OBS HIGH 50: CPT | Performed by: INTERNAL MEDICINE

## 2024-07-19 PROCEDURE — 250N000013 HC RX MED GY IP 250 OP 250 PS 637: Performed by: INTERNAL MEDICINE

## 2024-07-19 PROCEDURE — 120N000001 HC R&B MED SURG/OB

## 2024-07-19 RX ORDER — FERROUS SULFATE 325(65) MG
325 TABLET ORAL
Status: DISCONTINUED | OUTPATIENT
Start: 2024-07-19 | End: 2024-07-23 | Stop reason: HOSPADM

## 2024-07-19 RX ADMIN — ZOLPIDEM TARTRATE 5 MG: 5 TABLET ORAL at 22:22

## 2024-07-19 RX ADMIN — Medication 250 MG: at 12:03

## 2024-07-19 RX ADMIN — PANTOPRAZOLE SODIUM 40 MG: 40 TABLET, DELAYED RELEASE ORAL at 12:03

## 2024-07-19 RX ADMIN — FERROUS SULFATE TAB 325 MG (65 MG ELEMENTAL FE) 325 MG: 325 (65 FE) TAB at 12:03

## 2024-07-19 RX ADMIN — SPIRONOLACTONE 25 MG: 25 TABLET, FILM COATED ORAL at 12:03

## 2024-07-19 RX ADMIN — HYDROMORPHONE HYDROCHLORIDE 0.2 MG: 0.2 INJECTION, SOLUTION INTRAMUSCULAR; INTRAVENOUS; SUBCUTANEOUS at 08:45

## 2024-07-19 RX ADMIN — ENOXAPARIN SODIUM 40 MG: 40 INJECTION SUBCUTANEOUS at 12:02

## 2024-07-19 RX ADMIN — Medication 250 MG: at 19:57

## 2024-07-19 RX ADMIN — HYDROMORPHONE HYDROCHLORIDE 0.2 MG: 0.2 INJECTION, SOLUTION INTRAMUSCULAR; INTRAVENOUS; SUBCUTANEOUS at 15:19

## 2024-07-19 RX ADMIN — CITALOPRAM 40 MG: 40 TABLET ORAL at 12:04

## 2024-07-19 RX ADMIN — GABAPENTIN 100 MG: 100 CAPSULE ORAL at 15:13

## 2024-07-19 RX ADMIN — FUROSEMIDE 20 MG: 20 TABLET ORAL at 19:57

## 2024-07-19 RX ADMIN — GABAPENTIN 100 MG: 100 CAPSULE ORAL at 12:03

## 2024-07-19 RX ADMIN — GABAPENTIN 100 MG: 100 CAPSULE ORAL at 19:57

## 2024-07-19 RX ADMIN — SIMVASTATIN 40 MG: 40 TABLET, FILM COATED ORAL at 22:22

## 2024-07-19 RX ADMIN — ROPINIROLE HYDROCHLORIDE 1 MG: 1 TABLET, FILM COATED ORAL at 19:57

## 2024-07-19 RX ADMIN — ASPIRIN 81 MG CHEWABLE TABLET 81 MG: 81 TABLET CHEWABLE at 12:03

## 2024-07-19 RX ADMIN — ENOXAPARIN SODIUM 40 MG: 40 INJECTION SUBCUTANEOUS at 22:22

## 2024-07-19 RX ADMIN — ROPINIROLE HYDROCHLORIDE 0.5 MG: 0.25 TABLET, FILM COATED ORAL at 12:04

## 2024-07-19 RX ADMIN — BUPROPION HYDROCHLORIDE 150 MG: 150 TABLET, FILM COATED, EXTENDED RELEASE ORAL at 12:04

## 2024-07-19 RX ADMIN — SODIUM CHLORIDE 1250 MG: 9 INJECTION, SOLUTION INTRAVENOUS at 15:13

## 2024-07-19 ASSESSMENT — ACTIVITIES OF DAILY LIVING (ADL)
ADLS_ACUITY_SCORE: 32
ADLS_ACUITY_SCORE: 36
ADLS_ACUITY_SCORE: 36
ADLS_ACUITY_SCORE: 32
ADLS_ACUITY_SCORE: 38
ADLS_ACUITY_SCORE: 36
ADLS_ACUITY_SCORE: 32
ADLS_ACUITY_SCORE: 36
ADLS_ACUITY_SCORE: 36
ADLS_ACUITY_SCORE: 32
ADLS_ACUITY_SCORE: 36
ADLS_ACUITY_SCORE: 38
ADLS_ACUITY_SCORE: 32
ADLS_ACUITY_SCORE: 36
ADLS_ACUITY_SCORE: 38

## 2024-07-19 NOTE — CONSULTS
[facility] Nurse Inpatient Assessment     Consulted for: wound on both legs    Summary:     Patient History (according to provider note(s):      78 year old female presents for evaluation of bodyaches, fever, nausea, lightheadedness and concerns for possible wound infection on her right lower extremity.  Patient is chronic lymphedema of bilateral lower extremities, that appears to be worse on the right, has a large ulcer on the right lower extremity that is followed closely by the wound clinic, had good photodocumentation and examination yesterday in the wound clinic, those notes have been reviewed.  They were concerned for worsening of her chronic ulcer on the right lower extremity thus was started on Bactrim.  Today patient spontaneously developed a fever while driving and began feeling very unwell.  Patient febrile to 102.5 upon arrival, though otherwise hemodynamically stable.  Fever treated with Tylenol.  White count mildly elevated at 15.6, though lactic acid normal at 1.2.  Urinalysis without signs of infection, COVID/flu/RSV negative.  No significant respiratory symptoms, thus suspect pneumonia much less likely, though patient has had bad pneumonia in the past during a prolonged hospitalization thus will obtain a chest x-ray.  Would favor possible sepsis secondary to this worsening leg ulceration/cellulitis.  Patient also reporting some diarrhea, thus will obtain stool studies as well given that she recently started antibiotics and is now having new diarrhea.  Started on vancomycin for presumed soft tissue infection of the right lower extremity with signs of sepsis.  Will admit to hospitalist service.     Prior to making a final disposition on this patient the results of patient's tests and other diagnostic studies were discussed with the patient. All questions were answered. Patient expressed understanding of the plan and was amenable to it.    Assessment:      Wound location:  "Legs              7/17  RLE                                                                                                                                                                                                          BLE                                                          LLE  Last photo: 7/17  Wound due to: Venous Ulcer and lymphedema  Wound history/plan of care: is seen at a wound clinic for leg wraps and care  Wound base: 50 % non-granular tissue, 50 % granulation tissue     Palpation of the wound bed: normal      Drainage: moderate     Description of drainage: serous and serosanguinous - mostly serous     Measurements (length x width x depth, in cm): R lateral shin measures 14 x 11 x 0.1cm ; R medial knee 3.2 x 2 x 0.3cm; L shin measures 1.5 x 1 x 0.1cm, posterior measures 1.5 x 3.4 x 0.1cm     Tunneling: N/A     Undermining: N/A  Periwound skin: Denuded, Dry/scaly, Erythema- blanchable, and Superficial erosion      Color: pink and red      Temperature: warm  Odor: mild  Pain: severe, Irritable or crying out at intervals, tension to hands, feet and body, facial expression of distress, and during dressing change, throbbing, shooting, radiating, and tender  Pain interventions prior to dressing change: oral tylenol and slow and gentle cares   Treatment goal: Drainage control, Infection control/prevention, Protection, and Promote epidermal migration  STATUS: evolving and improving  Supplies ordered: gathered     Edema decreasing.    Treatment Plan:     BLE:  WOC nurses will do wound care T/F  If Edema wear \"netting\" is too uncomfortable, remove gently. Place hydrofera blue pieces back over the open wounds then cover with abd pads and wrap with kerlix. Alert wound care if dressing is removed.       EdemaWear stockings: Use and Care    Rationale for use:   Decreases edema by improving lymphatic and venous function    Safe and gentle compression  Enhances wound healing  Protects skin    General: " "  EdemaWear can be worn 24/7, but should be removed at least daily for skin inspection and cares    In order to be effective, EdemaWear should DIRECTLY contact the skin as much as possible -- the mesh weave promotes lymphatic drainage when it is pressed into the skin  Choose appropriate size EdemaWear based on leg (or arm) circumference - see table for guidelines  EdemaWear LITE is only for especially fragile or painful skin  Cleanse and moisturize any intact or scaly skin before applying EdemaWear  Ok to apply additional compression over the EdemaWear (ie Lymph wraps)    Application:   Apply the EdemaWear from base of toes to knee, or above knee if tolerated and it is a long stocking  Create a wide 3\" cuff at the top if needed to prevent rolling down  Only trim the stocking if it is excessively long; do NOT cut in half; can often fold over excess length onto foot    When wounds are present:  Wound dressings that directly treat the wound bed can be applied under the EdemaWear  Any additional cover dressings (dry gauze, ABD pads, Kerlix, etc) should be applied ON TOP of the stockings whenever feasible   Stockings may get soiled with drainage and will need to be washed; ensure an extra clean, dry pair always available  May need two people to apply the stocking - bunch up stocking and pull against each other, lifting over wounds    Care:  When stockings are soiled, DO NOT THROW AWAY, hand wash with mild soap, rinse, hang dry  Replace approximately every 4 to 6 months        EdemaWear size Max circumference Stripe color PS # # stockings per pack   Small 18\"  (45cm) navy 794328 2   Medium 30\"  (75cm) yellow 942137 1   Large 46\"  (115cm) red 629430 1   X Large 60\"  (150cm) aqua [id number] 1   Small LITE 24\"  (60cm) purple 096299 2   Medium LITE 36\"  (90cm) orange 287609 1     RLE  - Yellow from toes to knee, red from knee to upper thigh  LLE  - Blue from toes to knee, red from knee to upper thigh      Orders: " Written    RECOMMEND PRIMARY TEAM ORDER:  return to outpatient wound care team  Education provided: plan of care  Discussed plan of care with: Patient, Family, and Nurse  [facility] nurse follow-up plan: Tuesday/Friday  Notify [facility] if wound(s) deteriorate.  Nursing to notify the Provider(s) and re-consult the [facility] Nurse if new skin concern.    DATA:     Current support surface: Standard  Standard Isoflex gel  Containment of urine/stool: Incontinent pad in bed and Suction based external urinary catheter   BMI: Body mass index is 50.5 kg/m .   Active diet order: Orders Placed This Encounter      Combination Diet Regular Diet Adult     Output: I/O last 3 completed shifts:  In: 256 [P.O.:250; I.V.:6]  Out: 275 [Urine:275]     Labs:   Recent Labs   Lab 07/18/24  0912 07/17/24  0732 07/17/24  0705   ALBUMIN  --   --  3.1*   HGB 9.9*   < >  --    WBC 10.3   < >  --     < > = values in this interval not displayed.     Pressure injury risk assessment:   Sensory Perception: 3-->slightly limited  Moisture: 3-->occasionally moist  Activity: 3-->walks occasionally  Mobility: 3-->slightly limited  Nutrition: 3-->adequate  Friction and Shear: 2-->potential problem  Ajith Score: 17    BRADEN Blanco RN CWOCN  Pager no longer in use, please contact through [facility] group: [facility]

## 2024-07-19 NOTE — PROGRESS NOTES
"[facility] Progress Note    SUBJECTIVE: Doesn't feel well.  Legs look great (c/w admit) and white count nl.     REVIEW OF SYSTEMS:  Negative unless as listed above.  Social history, Family history, Medications: reviewed.    OBJECTIVE:  BP (!) 143/63 (BP Location: Right arm, Cuff Size: Adult Large)   Pulse 84   Temp (!) 100.5  F (38.1  C) (Axillary)   Resp (!) 32   Ht 1.524 m (5')   Wt 117.3 kg (258 lb 9.6 oz)   SpO2 96%   BMI 50.50 kg/m                PHYSICAL EXAM:  Alert, awake  Vitals tabulated above, reviewed  Neck supple without lymphadenopathy  Sclera normal color, not injected  CARDIOVASCULAR regular rate and rhythm, no murmur  Lungs CLEAR TO AUSCULTATION   Abdomen soft, NT/ND, absent HEPATOSPLENOMEGALY  Skin normal  Joints normal  Neurologic exam non focal                          Antibiotics:    Pertinent labs:    Recent Labs   Lab Test 07/18/24  0912 07/17/24  0732 07/16/24  1358   WBC 10.3 13.5* 15.6*   HGB 9.9* 9.7* 11.1*   HCT 32.1* 31.0* 35.5   MCV 86 88 87    194 242       Lab Results   Component Value Date    RBC 3.72 07/18/2024     Lab Results   Component Value Date    HGB 9.9 07/18/2024     Lab Results   Component Value Date    HCT 32.1 07/18/2024     No components found for: \"MCT\"  Lab Results   Component Value Date    MCV 86 07/18/2024     Lab Results   Component Value Date    MCH 26.6 07/18/2024     Lab Results   Component Value Date    MCHC 30.8 07/18/2024     Lab Results   Component Value Date    RDW 15.5 07/18/2024     Lab Results   Component Value Date     07/18/2024       Last Comprehensive Metabolic Panel:  Sodium   Date Value Ref Range Status   07/17/2024 142 135 - 145 mmol/L Final     Potassium   Date Value Ref Range Status   07/17/2024 4.5 3.4 - 5.3 mmol/L Final   06/24/2022 4.4 3.5 - 5.0 mmol/L Final     Chloride   Date Value Ref Range Status   07/17/2024 106 98 - 107 mmol/L Final   06/24/2022 100 98 - 107 mmol/L Final     Carbon Dioxide (CO2) "   Date Value Ref Range Status   07/17/2024 23 22 - 29 mmol/L Final   06/24/2022 25 22 - 31 mmol/L Final     Anion Gap   Date Value Ref Range Status   07/17/2024 13 7 - 15 mmol/L Final   06/24/2022 14 5 - 18 mmol/L Final     Glucose   Date Value Ref Range Status   06/24/2022 95 70 - 125 mg/dL Final     GLUCOSE BY METER POCT   Date Value Ref Range Status   07/19/2024 114 (H) 70 - 99 mg/dL Final     Urea Nitrogen   Date Value Ref Range Status   07/17/2024 16.8 8.0 - 23.0 mg/dL Final   06/24/2022 16 8 - 28 mg/dL Final     Creatinine   Date Value Ref Range Status   07/19/2024 0.84 0.51 - 0.95 mg/dL Final     GFR Estimate   Date Value Ref Range Status   07/19/2024 72 >60 mL/min/1.73m2 Final     Comment:     eGFR calculated using 2021 CKD-EPI equation.   09/26/2020 >60 >60 mL/min/1.73m2 Final     Calcium   Date Value Ref Range Status   07/17/2024 8.0 (L) 8.8 - 10.4 mg/dL Final     Comment:     Reference intervals for this test were updated on 7/16/2024 to reflect our healthy population more accurately. There may be differences in the flagging of prior results with similar values performed with this method. Those prior results can be interpreted in the context of the updated reference intervals.       Liver Function Studies -   Recent Labs   Lab Test 07/17/24  0705   PROTTOTAL 5.5*   ALBUMIN 3.1*   BILITOTAL 0.2   ALKPHOS 135   AST 44   ALT 34       Erythrocyte Sedimentation Rate   Date Value Ref Range Status   10/20/2021 55 (H) 0 - 20 mm/hr Final       CRP   Date Value Ref Range Status   10/11/2021 2.8 (H) 0.0-<0.8 mg/dL Final     C-Reactive Protein High Sensitivity   Date Value Ref Range Status   10/20/2021 31.7 (H) 0.0 - 3.0 mg/L Final     Comment:     Low risk < 1.0 mg/L  Average risk 1.0 to 3.0 mg/L  High risk > 3.0 mg/L  Acute inflamation > 10.0 mg/L               MICROBIOLOGY DATA:        IMAGING/RADIOLOGY:          ASSESSMENT:  Stasis BLE  Doubt has infection (or much of one)    RECOMMENDATION:   Latest Reference  Range & Units 07/16/24 13:58 07/17/24 07:32 07/18/24 09:12   WBC 4.0 - 11.0 10e3/uL 15.6 (H) 13.5 (H) 10.3   Vascular clinic can see her in 5 wks per their message to me.  Schedule appt  I am not worried about fevers  Vanco while here  Doxy for 5 days at Discharge  Wound care    SUMA Macias MD  Office [phone] option 2 to desk staff   I will SWITCH the dose or number of times a day I take the medications listed below when I get home from the hospital:  None

## 2024-07-19 NOTE — PLAN OF CARE
Problem: Adult Inpatient Plan of Care  Goal: Absence of Hospital-Acquired Illness or Injury  Intervention: Prevent Skin Injury  Recent Flowsheet Documentation  Taken 7/18/2024 1556 by Jocy Reeves RN  Body Position: position changed independently  Taken 7/18/2024 0800 by Jocy Reeves RN  Body Position: position changed independently     Problem: Adult Inpatient Plan of Care  Goal: Absence of Hospital-Acquired Illness or Injury  Intervention: Prevent and Manage VTE (Venous Thromboembolism) Risk  Recent Flowsheet Documentation  Taken 7/18/2024 1556 by Jocy Reeves RN  VTE Prevention/Management: (lymph wraps on) SCDs off (sequential compression devices)  Taken 7/18/2024 0800 by Jocy Reeves RN  VTE Prevention/Management: (lymph wraps on) SCDs off (sequential compression devices)   Goal Outcome Evaluation:         Pt's powers was controlled with prn Tylenol with some effects. New PIV, Abx.

## 2024-07-19 NOTE — PROGRESS NOTES
[facility]    Medicine Progress Note - Hospitalist Service    Date of Admission:  7/16/2024    Assessment & Plan         Elizabeth Kelley is a 78 year old female admitted on 7/16/2024.     She presented to the ER with complaint of fever, fatigue, body aches and diarrhea since yesterday and wound check.  In the ER patient was again seen to be having a fever 102, labs significant for leukocytosis, blood cultures were drawn.  Patient received vancomycin and fluids in the ED  She has been admitted for acute cellulitis of the right lower extremity and sepsis.           A/p :     Problem list and plan:      Acute cellulitis of the right lower extremity  Sepsis(fever, mild KIKE, leukocytosis)          Patient presented to the ER with complaint of fever, fatigue, body aches and diarrhea since yesterday and wound check   Patient also experienced nausea associated with lightheadedness and few episodes of diarrhea and felt dehydrated  She was also endorsing open wounds to both legs with lymphedema which has been present for a few years and patient has been seen by vascular surgery and has had to deal with multiple infection of these wounds  Patient also was started on a new antibiotic possibly Bactrim yesterday for wound infection  Again was seen febrile with a fever of 102 in the ED    Blood and urine cultures were drawn currently pending results  Urine analysis negative for acute process  Viral panel negative for COVID, flu, respiratory syncytial virus  Chest x-ray negative for acute process  Patient received vancomycin and fluids in the ED, will continue with same  ID consulted, follow-up recommendations      7/19 :     Spiking fevers again 100.5  Blood cultures 7/16 : NGTD  Urine culture 7/16 : mixed trang  Continue iv vancomycin  ID following  WOC consulted  venous ultrasound of legs : negative for blood clots        Cough and tachypnea - 7/19    Will check bnp, procal, CXR  Resume home lasix 20 mg  bid      Mild KIKE : resolved with iv fluids       Normocytic anemia : Monitor CBC, follow-up iron panel     History of mood disorder : Continue with bupropion, citalopram     History of lymphedema of the bilateral lower extremities  Neuropathy  Restless leg syndrome    Holding Lasix  - resumed 7/19  Continue with spironolactone  Continue with gabapentin  Continue with ropinirole     History of hyperlipidemia  Continue with aspirin and atorvastatin     Physical deconditioning/weakness  Fall precautions  PT and OT evaluation        BARRIERS TO DISCHARGE :     Still having fevers  ID clearance  Will discharge home with home OT  Another 1-2 days?           Diet: Combination Diet Regular Diet Adult    DVT Prophylaxis: Enoxaparin (Lovenox) SQ  Amato Catheter: Not present  Lines: None     Cardiac Monitoring: None  Code Status: Full Code      Clinically Significant Risk Factors              # Hypoalbuminemia: Lowest albumin = 3.1 g/dL at 7/17/2024  7:05 AM, will monitor as appropriate     # Hypertension: Noted on problem list              # Severe Obesity: Estimated body mass index is 50.5 kg/m  as calculated from the following:    Height as of this encounter: 1.524 m (5').    Weight as of this encounter: 117.3 kg (258 lb 9.6 oz)., PRESENT ON ADMISSION       # Financial/Environmental Concerns: none         Disposition Plan     Medically Ready for Discharge: Anticipated in 2-4 Days             Bryon Reza MD  Hospitalist Service  [facility]  Securely message with [email or web address] (more info)  Text page via [employer] Paging/Directory   ______________________________________________________________________            7/19 :         Spiking fevers again 100.5  Blood cultures 7/16 : NGTD  Urine culture 7/16 : mixed trang  Continue iv vancomycin  ID following  WOC consulted  venous ultrasound of legs : negative for blood clots    Having some cough and tachypnea  Will check bnp, procal, CXR    Creatinine  normalized  Stopped iv fluids    Ordered pt/ot evaluation : home with home OT    Not medically ready for discharge at this time.  Discharge once ok with ID             GENERAL: The patient is not in any acute distressed. Awake and alert.  HEENT: Nonicteric sclerae, PERRLA, EOMI. Oropharynx clear. Moist mucous membranes. Conjunctivae appear well perfused.  HEART: Regular rate and rhythm without murmurs.  LUNGS: Clear to auscultation bilaterally. No wheezing or crackles.  ABDOMEN: Soft, positive bowel sounds, nontender.  SKIN: No rash, no excessive bruising, petechiae, or purpura.  EXTREMITIES : Bilateral lower extremity lymphedema, right more than the left , Right lower extremity red, swollen, tender   NEUROLOGIC: conscious and oriented, follows commands, no obvious focal deficits.  ROS: All other systems negative       Physical Exam   Vital Signs: Temp: (!) 100.5  F (38.1  C) Temp src: Axillary BP: (!) 143/63 Pulse: 84   Resp: (!) 32 SpO2: 96 % O2 Device: None (Room air) Oxygen Delivery: 2 LPM  Weight: 258 lbs 9.59 oz              Medical Decision Making       60 MINUTES SPENT BY ME on the date of service doing chart review, history, exam, documentation & further activities per the note.  MANAGEMENT DISCUSSED with the following over the past 24 hours: rn, patient, family       Data     I have personally reviewed the following data over the past 24 hrs:    N/A  \   N/A   / N/A     N/A N/A N/A /  114 (H)   N/A N/A 0.84 \

## 2024-07-19 NOTE — PLAN OF CARE
"  Problem: Adult Inpatient Plan of Care  Goal: Patient-Specific Goal (Individualized)  Description: You can add care plan individualizations to a care plan. Examples of Individualization might be:  \"Parent requests to be called daily at 9am for status\", \"I have a hard time hearing out of my right ear\", or \"Do not touch me to wake me up as it startles  me\".  Outcome: Progressing  Flowsheets (Taken 7/19/2024 1806)  Patient/Family-Specific Goals (Include Timeframe): Maintain safety- call first, Monitor VS, Discharge soon, Sleep >50% of night shift.   Goal Outcome Evaluation:    Safety was maintained with hourly rounding, standard fall precautions, and bed alarm use. Pt remained compliant about calling for assistance.  Pt VS monitored throughout night shift, mild fever at start of shift, temp max 100F, managed with PRN tylenol.  Pt slept approx. 60% of night shift with minimal interruptions.  Discharge planning in progress.  "

## 2024-07-19 NOTE — PLAN OF CARE
Goal Outcome Evaluation:       A&Ox4, sometimes forgetful. Pain and tenderness in BLE, especially right. IV dilaudid effective. Wheezes, elevated temp, and tachypnea noted on assessment, provider informed, awaiting xray to be completed. Assist x1 with gaitbelt and quad cane. Small stool incontinence when staff not able to assist her immediately today. [facility] nurse completed wound care today.    Care plan to continue.             Problem: Infection  Goal: Absence of Infection Signs and Symptoms  Outcome: Not Progressing     Problem: Pain Acute  Goal: Optimal Pain Control and Function  Outcome: Progressing

## 2024-07-20 LAB
ALBUMIN SERPL BCG-MCNC: 2.9 G/DL (ref 3.5–5.2)
ALP SERPL-CCNC: 388 U/L (ref 40–150)
ALT SERPL W P-5'-P-CCNC: 62 U/L (ref 0–50)
ANION GAP SERPL CALCULATED.3IONS-SCNC: 10 MMOL/L (ref 7–15)
AST SERPL W P-5'-P-CCNC: 43 U/L (ref 0–45)
BASOPHILS # BLD AUTO: 0.1 10E3/UL (ref 0–0.2)
BASOPHILS NFR BLD AUTO: 1 %
BILIRUB SERPL-MCNC: 0.3 MG/DL
BUN SERPL-MCNC: 11.5 MG/DL (ref 8–23)
CALCIUM SERPL-MCNC: 8.6 MG/DL (ref 8.8–10.4)
CHLORIDE SERPL-SCNC: 100 MMOL/L (ref 98–107)
CREAT SERPL-MCNC: 0.78 MG/DL (ref 0.51–0.95)
EGFRCR SERPLBLD CKD-EPI 2021: 78 ML/MIN/1.73M2
EOSINOPHIL # BLD AUTO: 0.2 10E3/UL (ref 0–0.7)
EOSINOPHIL NFR BLD AUTO: 2 %
ERYTHROCYTE [DISTWIDTH] IN BLOOD BY AUTOMATED COUNT: 15.5 % (ref 10–15)
GLUCOSE BLDC GLUCOMTR-MCNC: 112 MG/DL (ref 70–99)
GLUCOSE BLDC GLUCOMTR-MCNC: 190 MG/DL (ref 70–99)
GLUCOSE BLDC GLUCOMTR-MCNC: 91 MG/DL (ref 70–99)
GLUCOSE SERPL-MCNC: 95 MG/DL (ref 70–99)
HCO3 SERPL-SCNC: 29 MMOL/L (ref 22–29)
HCT VFR BLD AUTO: 28.8 % (ref 35–47)
HGB BLD-MCNC: 9.3 G/DL (ref 11.7–15.7)
IMM GRANULOCYTES # BLD: 0.1 10E3/UL
IMM GRANULOCYTES NFR BLD: 1 %
LYMPHOCYTES # BLD AUTO: 1.3 10E3/UL (ref 0.8–5.3)
LYMPHOCYTES NFR BLD AUTO: 15 %
MCH RBC QN AUTO: 27.4 PG (ref 26.5–33)
MCHC RBC AUTO-ENTMCNC: 32.3 G/DL (ref 31.5–36.5)
MCV RBC AUTO: 85 FL (ref 78–100)
MONOCYTES # BLD AUTO: 0.9 10E3/UL (ref 0–1.3)
MONOCYTES NFR BLD AUTO: 11 %
NEUTROPHILS # BLD AUTO: 6.1 10E3/UL (ref 1.6–8.3)
NEUTROPHILS NFR BLD AUTO: 70 %
NRBC # BLD AUTO: 0 10E3/UL
NRBC BLD AUTO-RTO: 0 /100
PLATELET # BLD AUTO: 213 10E3/UL (ref 150–450)
POTASSIUM SERPL-SCNC: 3.8 MMOL/L (ref 3.4–5.3)
PROT SERPL-MCNC: 6.3 G/DL (ref 6.4–8.3)
RBC # BLD AUTO: 3.39 10E6/UL (ref 3.8–5.2)
SODIUM SERPL-SCNC: 139 MMOL/L (ref 135–145)
VANCOMYCIN SERPL-MCNC: 8.9 UG/ML
WBC # BLD AUTO: 8.7 10E3/UL (ref 4–11)

## 2024-07-20 PROCEDURE — 99207 PR NO CHARGE LOS: CPT | Performed by: INTERNAL MEDICINE

## 2024-07-20 PROCEDURE — 250N000013 HC RX MED GY IP 250 OP 250 PS 637: Performed by: STUDENT IN AN ORGANIZED HEALTH CARE EDUCATION/TRAINING PROGRAM

## 2024-07-20 PROCEDURE — 250N000011 HC RX IP 250 OP 636: Performed by: INTERNAL MEDICINE

## 2024-07-20 PROCEDURE — 250N000013 HC RX MED GY IP 250 OP 250 PS 637: Performed by: INTERNAL MEDICINE

## 2024-07-20 PROCEDURE — 85025 COMPLETE CBC W/AUTO DIFF WBC: CPT | Performed by: HOSPITALIST

## 2024-07-20 PROCEDURE — 250N000013 HC RX MED GY IP 250 OP 250 PS 637: Performed by: HOSPITALIST

## 2024-07-20 PROCEDURE — 82040 ASSAY OF SERUM ALBUMIN: CPT | Performed by: HOSPITALIST

## 2024-07-20 PROCEDURE — 80202 ASSAY OF VANCOMYCIN: CPT | Performed by: INTERNAL MEDICINE

## 2024-07-20 PROCEDURE — 999N000285 HC STATISTIC VASC ACCESS LAB DRAW WITH PIV START

## 2024-07-20 PROCEDURE — 258N000003 HC RX IP 258 OP 636: Performed by: INTERNAL MEDICINE

## 2024-07-20 PROCEDURE — 120N000001 HC R&B MED SURG/OB

## 2024-07-20 PROCEDURE — 99232 SBSQ HOSP IP/OBS MODERATE 35: CPT | Performed by: HOSPITALIST

## 2024-07-20 PROCEDURE — 999N000127 HC STATISTIC PERIPHERAL IV START W US GUIDANCE

## 2024-07-20 RX ORDER — ROPINIROLE 0.25 MG/1
0.25 TABLET, FILM COATED ORAL ONCE
Status: COMPLETED | OUTPATIENT
Start: 2024-07-20 | End: 2024-07-20

## 2024-07-20 RX ADMIN — BUPROPION HYDROCHLORIDE 150 MG: 150 TABLET, FILM COATED, EXTENDED RELEASE ORAL at 08:38

## 2024-07-20 RX ADMIN — Medication 250 MG: at 08:37

## 2024-07-20 RX ADMIN — ASPIRIN 81 MG CHEWABLE TABLET 81 MG: 81 TABLET CHEWABLE at 08:38

## 2024-07-20 RX ADMIN — ENOXAPARIN SODIUM 40 MG: 40 INJECTION SUBCUTANEOUS at 12:08

## 2024-07-20 RX ADMIN — FUROSEMIDE 20 MG: 20 TABLET ORAL at 08:37

## 2024-07-20 RX ADMIN — Medication 250 MG: at 19:55

## 2024-07-20 RX ADMIN — PANTOPRAZOLE SODIUM 40 MG: 40 TABLET, DELAYED RELEASE ORAL at 08:37

## 2024-07-20 RX ADMIN — ROPINIROLE HYDROCHLORIDE 1 MG: 1 TABLET, FILM COATED ORAL at 19:55

## 2024-07-20 RX ADMIN — ROPINIROLE HYDROCHLORIDE 0.25 MG: 0.25 TABLET, FILM COATED ORAL at 14:53

## 2024-07-20 RX ADMIN — GABAPENTIN 100 MG: 100 CAPSULE ORAL at 08:38

## 2024-07-20 RX ADMIN — SODIUM CHLORIDE 1500 MG: 9 INJECTION, SOLUTION INTRAVENOUS at 12:08

## 2024-07-20 RX ADMIN — ROPINIROLE HYDROCHLORIDE 0.5 MG: 0.25 TABLET, FILM COATED ORAL at 08:38

## 2024-07-20 RX ADMIN — SPIRONOLACTONE 25 MG: 25 TABLET, FILM COATED ORAL at 08:37

## 2024-07-20 RX ADMIN — SIMVASTATIN 40 MG: 40 TABLET, FILM COATED ORAL at 21:59

## 2024-07-20 RX ADMIN — FUROSEMIDE 20 MG: 20 TABLET ORAL at 19:55

## 2024-07-20 RX ADMIN — GABAPENTIN 100 MG: 100 CAPSULE ORAL at 13:53

## 2024-07-20 RX ADMIN — HYDROMORPHONE HYDROCHLORIDE 0.2 MG: 0.2 INJECTION, SOLUTION INTRAMUSCULAR; INTRAVENOUS; SUBCUTANEOUS at 23:54

## 2024-07-20 RX ADMIN — ENOXAPARIN SODIUM 40 MG: 40 INJECTION SUBCUTANEOUS at 22:01

## 2024-07-20 RX ADMIN — HYDROMORPHONE HYDROCHLORIDE 0.2 MG: 0.2 INJECTION, SOLUTION INTRAMUSCULAR; INTRAVENOUS; SUBCUTANEOUS at 18:55

## 2024-07-20 RX ADMIN — GABAPENTIN 100 MG: 100 CAPSULE ORAL at 19:55

## 2024-07-20 RX ADMIN — CITALOPRAM 40 MG: 40 TABLET ORAL at 12:08

## 2024-07-20 ASSESSMENT — ACTIVITIES OF DAILY LIVING (ADL)
ADLS_ACUITY_SCORE: 38
ADLS_ACUITY_SCORE: 37
ADLS_ACUITY_SCORE: 38
ADLS_ACUITY_SCORE: 37
ADLS_ACUITY_SCORE: 38
ADLS_ACUITY_SCORE: 38
ADLS_ACUITY_SCORE: 37
ADLS_ACUITY_SCORE: 38
ADLS_ACUITY_SCORE: 37
ADLS_ACUITY_SCORE: 38

## 2024-07-20 NOTE — PROGRESS NOTES
[facility] Progress Note  07/20/2024    Chart reviewed  No fever today   Plan per note by Dr. Macias - please see below  ID signing off as plan in place per Dr. Macias. Please call with questions  No charge - chart checked  ID will follow on Monday, 7/22/24. Please call with questions    Kathe Vargas MD  [facility]  Answering Service: [phone]  On-Call ID provider: [phone], option: 9    Note by Dr. Macias below:     ASSESSMENT:  Stasis BLE  Doubt has infection (or much of one)    RECOMMENDATION:   Latest Reference Range & Units 07/16/24 13:58 07/17/24 07:32 07/18/24 09:12   WBC 4.0 - 11.0 10e3/uL 15.6 (H) 13.5 (H) 10.3   Vascular clinic can see her in 5 wks per their message to me.  Schedule appt  I am not worried about fevers  Vanco while here  Doxy for 5 days at Discharge  Wound care    SUMA Macias MD  Office [phone] option 2 to desk staff    SUBJECTIVE: Doesn't feel well.  Legs look great (c/w admit) and white count nl.     REVIEW OF SYSTEMS:  Negative unless as listed above.  Social history, Family history, Medications: reviewed.    OBJECTIVE:  /61 (BP Location: Left arm)   Pulse 79   Temp 98.4  F (36.9  C) (Oral)   Resp 24   Ht 1.524 m (5')   Wt 117.3 kg (258 lb 9.6 oz)   SpO2 94%   BMI 50.50 kg/m                PHYSICAL EXAM:  Alert, awake  Vitals tabulated above, reviewed  Neck supple without lymphadenopathy  Sclera normal color, not injected  CARDIOVASCULAR regular rate and rhythm, no murmur  Lungs CLEAR TO AUSCULTATION   Abdomen soft, NT/ND, absent HEPATOSPLENOMEGALY  Skin normal  Joints normal  Neurologic exam non focal                          Antibiotics:    Pertinent labs:    Recent Labs   Lab Test 07/20/24  0957 07/18/24  0912 07/17/24  0732   WBC 8.7 10.3 13.5*   HGB 9.3* 9.9* 9.7*   HCT 28.8* 32.1* 31.0*   MCV 85 86 88    196 194       Lab Results   Component Value Date    RBC 3.72 07/18/2024     Lab Results   Component Value  "Date    HGB 9.9 07/18/2024     Lab Results   Component Value Date    HCT 32.1 07/18/2024     No components found for: \"MCT\"  Lab Results   Component Value Date    MCV 86 07/18/2024     Lab Results   Component Value Date    MCH 26.6 07/18/2024     Lab Results   Component Value Date    MCHC 30.8 07/18/2024     Lab Results   Component Value Date    RDW 15.5 07/18/2024     Lab Results   Component Value Date     07/18/2024       Last Comprehensive Metabolic Panel:  Sodium   Date Value Ref Range Status   07/20/2024 139 135 - 145 mmol/L Final     Potassium   Date Value Ref Range Status   07/20/2024 3.8 3.4 - 5.3 mmol/L Final   06/24/2022 4.4 3.5 - 5.0 mmol/L Final     Chloride   Date Value Ref Range Status   07/20/2024 100 98 - 107 mmol/L Final   06/24/2022 100 98 - 107 mmol/L Final     Carbon Dioxide (CO2)   Date Value Ref Range Status   07/20/2024 29 22 - 29 mmol/L Final   06/24/2022 25 22 - 31 mmol/L Final     Anion Gap   Date Value Ref Range Status   07/20/2024 10 7 - 15 mmol/L Final   06/24/2022 14 5 - 18 mmol/L Final     Glucose   Date Value Ref Range Status   07/20/2024 95 70 - 99 mg/dL Final   06/24/2022 95 70 - 125 mg/dL Final     GLUCOSE BY METER POCT   Date Value Ref Range Status   07/20/2024 91 70 - 99 mg/dL Final     Urea Nitrogen   Date Value Ref Range Status   07/20/2024 11.5 8.0 - 23.0 mg/dL Final   06/24/2022 16 8 - 28 mg/dL Final     Creatinine   Date Value Ref Range Status   07/20/2024 0.78 0.51 - 0.95 mg/dL Final     GFR Estimate   Date Value Ref Range Status   07/20/2024 78 >60 mL/min/1.73m2 Final     Comment:     eGFR calculated using 2021 CKD-EPI equation.   09/26/2020 >60 >60 mL/min/1.73m2 Final     Calcium   Date Value Ref Range Status   07/20/2024 8.6 (L) 8.8 - 10.4 mg/dL Final     Comment:     Reference intervals for this test were updated on 7/16/2024 to reflect our healthy population more accurately. There may be differences in the flagging of prior results with similar values performed " with this method. Those prior results can be interpreted in the context of the updated reference intervals.       Liver Function Studies -   Recent Labs   Lab Test 07/17/24  0705   PROTTOTAL 5.5*   ALBUMIN 3.1*   BILITOTAL 0.2   ALKPHOS 135   AST 44   ALT 34       Erythrocyte Sedimentation Rate   Date Value Ref Range Status   10/20/2021 55 (H) 0 - 20 mm/hr Final       CRP   Date Value Ref Range Status   10/11/2021 2.8 (H) 0.0-<0.8 mg/dL Final     C-Reactive Protein High Sensitivity   Date Value Ref Range Status   10/20/2021 31.7 (H) 0.0 - 3.0 mg/L Final     Comment:     Low risk < 1.0 mg/L  Average risk 1.0 to 3.0 mg/L  High risk > 3.0 mg/L  Acute inflamation > 10.0 mg/L               MICROBIOLOGY DATA:  Reviewed  Susceptibility data from last 90 days.  Collected Specimen Info Organism   07/16/24 Urine, NOS Urogenital trang           IMAGING/RADIOLOGY:    XR Chest Port 1 View    Result Date: 7/19/2024  EXAM: XR CHEST PORT 1 VIEW LOCATION: [facility] DATE: 7/19/2024 INDICATION: cough COMPARISON: Portable AP view the chest 7/16/2024     IMPRESSION: Small, unchanged hiatal hernia. Cardiac silhouette is normal in size. Mediastinal borders are well-defined. Hilar contours and lung vascularity are normal. Minimal band of atelectasis in the lateral right base. The lungs are otherwise clear. No visible pleural fluid. No pneumothorax.    US Lower Extremity Venous Duplex Bilateral    Result Date: 7/19/2024  EXAM: US LOWER EXTREMITY VENOUS DUPLEX BILATERAL LOCATION: [facility] DATE: 7/19/2024 INDICATION: swelling of legs COMPARISON: 8/3/2023 TECHNIQUE: Venous Duplex ultrasound of bilateral lower extremities with and without compression, augmentation and duplex. Color flow and spectral Doppler with waveform analysis performed. FINDINGS: Exam includes the common femoral, femoral, popliteal veins as well as segmentally visualized deep calf veins and greater saphenous vein. RIGHT:  No deep vein thrombosis. No superficial thrombophlebitis. No popliteal cyst. LEFT: No deep vein thrombosis. No superficial thrombophlebitis. No popliteal cyst.     IMPRESSION: 1.  No deep venous thrombosis in the bilateral lower extremities.    XR Chest Port 1 View    Result Date: 7/16/2024  EXAM: XR CHEST PORT 1 VIEW LOCATION: [facility] DATE: 7/16/2024 INDICATION: Fever. COMPARISON: 4/7/2022.     IMPRESSION: Negative chest. Lungs are grossly clear.

## 2024-07-20 NOTE — PHARMACY-VANCOMYCIN DOSING SERVICE
Pharmacy Vancomycin Note  Date of Service 2024  Patient's  1945   78 year old, female    Indication: Skin and Soft Tissue Infection  Day of Therapy: 5  Current vancomycin regimen:  1250 mg IV q24h  Current vancomycin monitoring method: AUC  Current vancomycin therapeutic monitoring goal: 400-600 mg*h/L    [employer] Prediction of Current Vancomycin Regimen  Loading dose: 2500mg x1 then 1000mg x1   Regimen: 1250 mg IV every 24 hours.  Start time: 15:13 on 2024  Exposure target: AUC24 (range)400-600 mg/L.hr   AUC24,ss: 355 mg/L.hr  Probability of AUC24 > 400: 29 %  Ctrough,ss: 5.6 mg/L  Probability of Ctrough,ss > 20: 0 %  Probability of nephrotoxicity (Lodise LOUIS ): 3 %      Current estimated CrCl = Estimated Creatinine Clearance: 71.9 mL/min (based on SCr of 0.78 mg/dL).    Creatinine for last 3 days  2024:  9:12 AM Creatinine 0.84 mg/dL  2024:  7:10 AM Creatinine 0.84 mg/dL  2024:  9:57 AM Creatinine 0.78 mg/dL    Recent Vancomycin Levels (past 3 days)  2024:  9:57 AM Vancomycin 8.9 ug/mL    Vancomycin IV Administrations (past 72 hours)                     vancomycin (VANCOCIN) 1,250 mg in sodium chloride 0.9 % 250 mL intermittent infusion (mg) 1,250 mg New Bag 24 1513     1,250 mg New Bag 24 1542    vancomycin (VANCOCIN) 1,000 mg in 200 mL dextrose intermittent infusion (mg) 1,000 mg New Bag 24 1711                    Nephrotoxins and other renal medications (From now, onward)      Start     Dose/Rate Route Frequency Ordered Stop    24 1200  vancomycin (VANCOCIN) 1,500 mg in sodium chloride 0.9 % 250 mL intermittent infusion         1,500 mg  over 90 Minutes Intravenous EVERY 18 HOURS 24 1052      24 2000  furosemide (LASIX) tablet 20 mg        Note to Pharmacy: PTA Sig:Take 20 mg by mouth 2 times daily      20 mg Oral 2 TIMES DAILY 24 1729                 Contrast Orders - past 72 hours (72h ago, onward)      None             Interpretation of levels and current regimen:  Vancomycin level is reflective of subtherapeutic level    Has serum creatinine changed greater than 50% in last 72 hours: No    Renal Function: Improving    Insight[employer] Prediction of Planned New Vancomycin Regimen  Regimen: 1500 mg IV every 18 hours.  Start time: 15:13 on 07/20/2024  Exposure target: AUC24 (range)400-600 mg/L.hr   AUC24,ss: 568 mg/L.hr  Probability of AUC24 > 400: 94 %  Ctrough,ss: 11.6 mg/L  Probability of Ctrough,ss > 20: 0 %  Probability of nephrotoxicity (Lodise LOUIS 2009): 7 %      Plan:  Increase Dose to 1500mg iv q18h  Vancomycin monitoring method: AUC  Vancomycin therapeutic monitoring goal: 400-600 mg*h/L  Pharmacy will check vancomycin levels as appropriate in  2-4 days .  Serum creatinine levels will be ordered daily for the first week of therapy and at least twice weekly for subsequent weeks.    Quan Manzo [facility]

## 2024-07-20 NOTE — PROGRESS NOTES
[facility]    Medicine Progress Note - Hospitalist Service    Date of Admission:  7/16/2024    Assessment & Plan   Elizabeth Kelley is a 78 year old female admitted on 7/16/2024.   She presented to the ER with complaint of fever, fatigue, body aches and diarrhea since yesterday and wound check.  In the ER patient was again seen to be having a fever 102, labs significant for leukocytosis, blood cultures were drawn.  Patient received vancomycin and fluids in the ED  She has been admitted for acute cellulitis of the right lower extremity and sepsis.    Acute cellulitis of the right lower extremity  Sepsis(fever, mild KIKE, leukocytosis)  Patient presented to the ER with complaint of fever, fatigue, body aches and diarrhea since yesterday and wound check   Patient also experienced nausea associated with lightheadedness and few episodes of diarrhea and felt dehydrated  She was also endorsing open wounds to both legs with lymphedema which has been present for a few years and patient has been seen by vascular surgery and has had to deal with multiple infection of these wounds  Patient also was started on a new antibiotic possibly Bactrim yesterday for wound infection  Again was seen febrile with a fever of 102 in the ED  -Sepsis on admission is resolving  -Blood cultures  7/16 NGTD  -Urine culture 7/16, urogenital trang  -Chest x-ray 7/19 reviewed.  It reveals small, unchanged hiatal hernia.  Lungs are otherwise clear no visible pleural fluid or pneumothorax  -Viral panel negative for COVID, flu, respiratory syncytial virus  -Urine analysis negative for acute process  -7/19 spiked fever temperature noted 100.5.  Seen by ID per ID not worried about fevers. Continue vancomycin while here and upon discharge may need doxycycline for 5 days  -7/20, afebrile at this time.  Plan to continue antibiotics per ID.  -ID consulted and following. Appreciate their recommendations  -WOC consulted  -venous ultrasound  of legs : negative for blood clots    Cough and tachypnea - 7/19  - Tachypnea resolved at this time  -Chest x-ray 7/19 reviewed.  It reveals small, unchanged hiatal hernia.  Lungs are otherwise clear no visible pleural fluid or pneumothorax  -Incentive spirometry  -Antitussive prn    Mild KIKE : resolved s/p fluids     Normocytic anemia :  Stable at this time  - Monitor H&H.  Type screen and transfuse PRBCs for Hb <7     History of mood disorder : Chronic. Stable.  - Continue with bupropion, citalopram     History of lymphedema of the bilateral lower extremities  Neuropathy  Restless leg syndrome  -Continue with lasix  - resumed 7/19  -Continue with spironolactone  -Continue with gabapentin  -Continue with ropinirole     History of hyperlipidemia. Chronic. Stable  -Simvastatin 40 mg at bedtime     Physical deconditioning/weakness  -Fall precautions  -PT/OT    BARRIERS TO DISCHARGE :     Still having fevers on and off  ID clearance  Will discharge home with home OT  Another 1-2 days?     Diet: Combination Diet Regular Diet Adult    DVT Prophylaxis: Enoxaparin (Lovenox) SQ  Amato Catheter: Not present  Lines: None     Cardiac Monitoring: None  Code Status: Full Code      Clinically Significant Risk Factors              # Hypoalbuminemia: Lowest albumin = 2.9 g/dL at 7/20/2024  9:57 AM, will monitor as appropriate     # Hypertension: Noted on problem list              # Severe Obesity: Estimated body mass index is 50.5 kg/m  as calculated from the following:    Height as of this encounter: 1.524 m (5').    Weight as of this encounter: 117.3 kg (258 lb 9.6 oz)., PRESENT ON ADMISSION       # Financial/Environmental Concerns: none         Disposition Plan     Medically Ready for Discharge: Anticipated in 2-4 Days    Omero Shukla MD  Hospitalist Service  [facility]  Securely message with [employer] (more info)  Text page via [employer] Paging/Directory    ______________________________________________________________________    Physical Exam   Vital Signs: Temp: 97.3  F (36.3  C) Temp src: Axillary BP: (!) 144/75 (Nurse notified.) Pulse: 77   Resp: 25 SpO2: 94 % O2 Device: None (Room air)    Weight: 258 lbs 9.59 oz    General: Patient is lying in bed comfortably she appears in no distress  HEENT: Head is normocephalic atraumatic eyes pupils appear equal round and reactive to light  Heart: She has a good S1 and S2 no obvious murmurs peripheral pulses are palpable.  Lungs: She has a normal respiratory effort on auscultation good air entry is noted  Abdomen: Soft nontender nondistended bowel sounds are noted  Musculoskeletal: Bilateral lower extremity lymphedema is noted erythema and some swelling noted to her right lower extremity.  Tender to touch  Skin: Please refer to nursing/wound care note for complete skin exam.    Medical Decision Making       45 MINUTES SPENT BY ME on the date of service doing chart review, history, exam, documentation & further activities per the note.  MANAGEMENT DISCUSSED with the following over the past 24 hours: staff RN and Patient       Data   Lab Results   Component Value Date    WBC 8.7 07/20/2024     Lab Results   Component Value Date    RBC 3.39 07/20/2024     Lab Results   Component Value Date    HGB 9.3 07/20/2024     Lab Results   Component Value Date    HCT 28.8 07/20/2024     Lab Results   Component Value Date    MCV 85 07/20/2024     Lab Results   Component Value Date    MCH 27.4 07/20/2024     Lab Results   Component Value Date    MCHC 32.3 07/20/2024     Lab Results   Component Value Date    RDW 15.5 07/20/2024     Lab Results   Component Value Date     07/20/2024     Recent Labs   Lab Test 07/20/24  0957 07/20/24  0842 07/19/24  0808 07/19/24  0710 07/17/24  0738 07/17/24  0705     --   --   --   --  142   POTASSIUM 3.8  --   --   --   --  4.5   CHLORIDE 100  --   --   --   --  106   CO2 29  --   --   --   --   23   ANIONGAP 10  --   --   --   --  13   GLC 95 91   < >  --    < > 99   BUN 11.5  --   --   --   --  16.8   CR 0.78  --   --  0.84   < > 0.97*   JUNI 8.6*  --   --   --   --  8.0*    < > = values in this interval not displayed.

## 2024-07-20 NOTE — PLAN OF CARE
Goal Outcome Evaluation:       A&Ox4. Reports 3/10 pain in BLE, tolerable, declines intervention early in day. Respirations continue to be shallow, lung sounds dim. Reports RLS symptoms in afternoon and requested extra dose of Requip, see MAR. Effective, but reports pain 6/10 around 1830. Administered dose of IV dilaudid for pain. Noted significant drainage from RLE wounds; kerlix saturated with serosanguinous drainage tinted green, removed soiled kerlix and started to apply hydrofera blue, but pt reports significant pain with these cares and requests to finish redressing wounds until after dilaudid more effective, passed this on to NOC RN. Appreciate team effort.    Care plan to continue.          Problem: Infection  Goal: Absence of Infection Signs and Symptoms  Outcome: Progressing     Problem: Pain Acute  Goal: Optimal Pain Control and Function  Outcome: Progressing

## 2024-07-20 NOTE — PLAN OF CARE
Problem: Adult Inpatient Plan of Care  Goal: Optimal Comfort and Wellbeing  Outcome: Progressing     Problem: Infection  Goal: Absence of Infection Signs and Symptoms  Outcome: Progressing     Problem: Pain Acute  Goal: Optimal Pain Control and Function  Outcome: Progressing   Goal Outcome Evaluation: Pt alert and oriented. PRN Ambien given for sleep.

## 2024-07-21 LAB
ALBUMIN SERPL BCG-MCNC: 2.7 G/DL (ref 3.5–5.2)
ALP SERPL-CCNC: 438 U/L (ref 40–150)
ALT SERPL W P-5'-P-CCNC: 68 U/L (ref 0–50)
ANION GAP SERPL CALCULATED.3IONS-SCNC: 11 MMOL/L (ref 7–15)
AST SERPL W P-5'-P-CCNC: 49 U/L (ref 0–45)
BACTERIA BLD CULT: NO GROWTH
BACTERIA BLD CULT: NO GROWTH
BASOPHILS # BLD AUTO: 0.1 10E3/UL (ref 0–0.2)
BASOPHILS NFR BLD AUTO: 1 %
BILIRUB SERPL-MCNC: 0.2 MG/DL
BUN SERPL-MCNC: 11.2 MG/DL (ref 8–23)
CALCIUM SERPL-MCNC: 8.5 MG/DL (ref 8.8–10.4)
CHLORIDE SERPL-SCNC: 101 MMOL/L (ref 98–107)
CREAT SERPL-MCNC: 0.83 MG/DL (ref 0.51–0.95)
EGFRCR SERPLBLD CKD-EPI 2021: 73 ML/MIN/1.73M2
EOSINOPHIL # BLD AUTO: 0.3 10E3/UL (ref 0–0.7)
EOSINOPHIL NFR BLD AUTO: 4 %
ERYTHROCYTE [DISTWIDTH] IN BLOOD BY AUTOMATED COUNT: 15.6 % (ref 10–15)
GLUCOSE BLDC GLUCOMTR-MCNC: 105 MG/DL (ref 70–99)
GLUCOSE BLDC GLUCOMTR-MCNC: 110 MG/DL (ref 70–99)
GLUCOSE BLDC GLUCOMTR-MCNC: 115 MG/DL (ref 70–99)
GLUCOSE SERPL-MCNC: 99 MG/DL (ref 70–99)
HCO3 SERPL-SCNC: 27 MMOL/L (ref 22–29)
HCT VFR BLD AUTO: 29.1 % (ref 35–47)
HGB BLD-MCNC: 9.2 G/DL (ref 11.7–15.7)
IMM GRANULOCYTES # BLD: 0.2 10E3/UL
IMM GRANULOCYTES NFR BLD: 2 %
LYMPHOCYTES # BLD AUTO: 1.7 10E3/UL (ref 0.8–5.3)
LYMPHOCYTES NFR BLD AUTO: 20 %
MCH RBC QN AUTO: 27.2 PG (ref 26.5–33)
MCHC RBC AUTO-ENTMCNC: 31.6 G/DL (ref 31.5–36.5)
MCV RBC AUTO: 86 FL (ref 78–100)
MONOCYTES # BLD AUTO: 1.1 10E3/UL (ref 0–1.3)
MONOCYTES NFR BLD AUTO: 12 %
NEUTROPHILS # BLD AUTO: 5.3 10E3/UL (ref 1.6–8.3)
NEUTROPHILS NFR BLD AUTO: 62 %
NRBC # BLD AUTO: 0 10E3/UL
NRBC BLD AUTO-RTO: 0 /100
PLATELET # BLD AUTO: 216 10E3/UL (ref 150–450)
POTASSIUM SERPL-SCNC: 3.7 MMOL/L (ref 3.4–5.3)
PROT SERPL-MCNC: 6.1 G/DL (ref 6.4–8.3)
RBC # BLD AUTO: 3.38 10E6/UL (ref 3.8–5.2)
SODIUM SERPL-SCNC: 139 MMOL/L (ref 135–145)
WBC # BLD AUTO: 8.5 10E3/UL (ref 4–11)

## 2024-07-21 PROCEDURE — 85025 COMPLETE CBC W/AUTO DIFF WBC: CPT | Performed by: HOSPITALIST

## 2024-07-21 PROCEDURE — 250N000009 HC RX 250: Performed by: HOSPITALIST

## 2024-07-21 PROCEDURE — 82040 ASSAY OF SERUM ALBUMIN: CPT | Performed by: HOSPITALIST

## 2024-07-21 PROCEDURE — 36415 COLL VENOUS BLD VENIPUNCTURE: CPT | Performed by: HOSPITALIST

## 2024-07-21 PROCEDURE — 250N000013 HC RX MED GY IP 250 OP 250 PS 637: Performed by: HOSPITALIST

## 2024-07-21 PROCEDURE — 120N000001 HC R&B MED SURG/OB

## 2024-07-21 PROCEDURE — 36569 INSJ PICC 5 YR+ W/O IMAGING: CPT

## 2024-07-21 PROCEDURE — 258N000003 HC RX IP 258 OP 636: Performed by: INTERNAL MEDICINE

## 2024-07-21 PROCEDURE — 250N000013 HC RX MED GY IP 250 OP 250 PS 637: Performed by: STUDENT IN AN ORGANIZED HEALTH CARE EDUCATION/TRAINING PROGRAM

## 2024-07-21 PROCEDURE — 250N000013 HC RX MED GY IP 250 OP 250 PS 637: Performed by: INTERNAL MEDICINE

## 2024-07-21 PROCEDURE — 250N000011 HC RX IP 250 OP 636: Performed by: INTERNAL MEDICINE

## 2024-07-21 PROCEDURE — 272N000451 HC KIT SHRLOCK 5FR POWER PICC DOUBLE LUMEN

## 2024-07-21 RX ORDER — GUAIFENESIN 200 MG/10ML
200 LIQUID ORAL EVERY 4 HOURS PRN
Status: DISCONTINUED | OUTPATIENT
Start: 2024-07-21 | End: 2024-07-23 | Stop reason: HOSPADM

## 2024-07-21 RX ORDER — LIDOCAINE 40 MG/G
CREAM TOPICAL
Status: DISCONTINUED | OUTPATIENT
Start: 2024-07-21 | End: 2024-07-23 | Stop reason: HOSPADM

## 2024-07-21 RX ORDER — BENZOCAINE/MENTHOL 6 MG-10 MG
LOZENGE MUCOUS MEMBRANE 2 TIMES DAILY
Status: DISCONTINUED | OUTPATIENT
Start: 2024-07-21 | End: 2024-07-23 | Stop reason: HOSPADM

## 2024-07-21 RX ADMIN — ACETAMINOPHEN 975 MG: 325 TABLET ORAL at 20:24

## 2024-07-21 RX ADMIN — FUROSEMIDE 20 MG: 20 TABLET ORAL at 20:20

## 2024-07-21 RX ADMIN — SPIRONOLACTONE 25 MG: 25 TABLET, FILM COATED ORAL at 08:54

## 2024-07-21 RX ADMIN — ACETAMINOPHEN 975 MG: 325 TABLET ORAL at 14:23

## 2024-07-21 RX ADMIN — SIMVASTATIN 40 MG: 40 TABLET, FILM COATED ORAL at 22:46

## 2024-07-21 RX ADMIN — Medication 250 MG: at 20:20

## 2024-07-21 RX ADMIN — ACETAMINOPHEN 975 MG: 325 TABLET ORAL at 04:42

## 2024-07-21 RX ADMIN — ENOXAPARIN SODIUM 40 MG: 40 INJECTION SUBCUTANEOUS at 12:11

## 2024-07-21 RX ADMIN — HYDROMORPHONE HYDROCHLORIDE 0.2 MG: 0.2 INJECTION, SOLUTION INTRAMUSCULAR; INTRAVENOUS; SUBCUTANEOUS at 14:24

## 2024-07-21 RX ADMIN — CITALOPRAM 40 MG: 40 TABLET ORAL at 12:11

## 2024-07-21 RX ADMIN — SODIUM CHLORIDE 1500 MG: 9 INJECTION, SOLUTION INTRAVENOUS at 07:54

## 2024-07-21 RX ADMIN — Medication 250 MG: at 08:54

## 2024-07-21 RX ADMIN — GABAPENTIN 100 MG: 100 CAPSULE ORAL at 20:20

## 2024-07-21 RX ADMIN — PANTOPRAZOLE SODIUM 40 MG: 40 TABLET, DELAYED RELEASE ORAL at 08:54

## 2024-07-21 RX ADMIN — LIDOCAINE HYDROCHLORIDE 2 ML: 10 INJECTION, SOLUTION EPIDURAL; INFILTRATION; INTRACAUDAL; PERINEURAL at 11:30

## 2024-07-21 RX ADMIN — ROPINIROLE HYDROCHLORIDE 0.5 MG: 0.25 TABLET, FILM COATED ORAL at 08:55

## 2024-07-21 RX ADMIN — HYDROCORTISONE: 1 CREAM TOPICAL at 12:12

## 2024-07-21 RX ADMIN — ASPIRIN 81 MG CHEWABLE TABLET 81 MG: 81 TABLET CHEWABLE at 08:55

## 2024-07-21 RX ADMIN — HYDROMORPHONE HYDROCHLORIDE 0.2 MG: 0.2 INJECTION, SOLUTION INTRAMUSCULAR; INTRAVENOUS; SUBCUTANEOUS at 20:26

## 2024-07-21 RX ADMIN — HYDROCORTISONE: 1 CREAM TOPICAL at 20:29

## 2024-07-21 RX ADMIN — GABAPENTIN 100 MG: 100 CAPSULE ORAL at 08:55

## 2024-07-21 RX ADMIN — BUPROPION HYDROCHLORIDE 150 MG: 150 TABLET, FILM COATED, EXTENDED RELEASE ORAL at 08:55

## 2024-07-21 RX ADMIN — ENOXAPARIN SODIUM 40 MG: 40 INJECTION SUBCUTANEOUS at 23:58

## 2024-07-21 RX ADMIN — GABAPENTIN 100 MG: 100 CAPSULE ORAL at 14:24

## 2024-07-21 RX ADMIN — ROPINIROLE HYDROCHLORIDE 1 MG: 1 TABLET, FILM COATED ORAL at 20:20

## 2024-07-21 RX ADMIN — FUROSEMIDE 20 MG: 20 TABLET ORAL at 08:58

## 2024-07-21 ASSESSMENT — ACTIVITIES OF DAILY LIVING (ADL)
ADLS_ACUITY_SCORE: 33
ADLS_ACUITY_SCORE: 33
ADLS_ACUITY_SCORE: 38
ADLS_ACUITY_SCORE: 33
ADLS_ACUITY_SCORE: 38
ADLS_ACUITY_SCORE: 33
ADLS_ACUITY_SCORE: 38
ADLS_ACUITY_SCORE: 33
ADLS_ACUITY_SCORE: 38
ADLS_ACUITY_SCORE: 33
ADLS_ACUITY_SCORE: 33
ADLS_ACUITY_SCORE: 38
ADLS_ACUITY_SCORE: 33
ADLS_ACUITY_SCORE: 33

## 2024-07-21 NOTE — PROCEDURES
"PICC Line Insertion Procedure Note  Pt. Name: Elizabeth Kelley  MRN:        [id number]    Procedure: Insertion of a  dual Lumen  5 fr  Bard SOLO (valved) Power PICC, Lot number [id number]    Indications: poor access/ antibiotics    Contraindications : none noted    Procedure Details     Patient identified with 2 identifiers and \"Time Out\" conducted.  .     Central line insertion bundle followed: hand hygiene performed prior to procedure, site cleansed with cholraprep, hat, mask, sterile gloves, sterile gown worn, patient draped with maximum barrier head to toe drape, sterile field maintained.    The vein was assessed and found to be compressible and of adequate size.     Lidocaine 1% 2 ml administered sq to the insertion site. A 5 Fr PICC was inserted into the CEPHALIC vein of the left arm with ultrasound guidance. 1 attempt(s) required to access vein.   Catheter threaded without difficulty. Good blood return noted.    Modified Seldinger Technique used for insertion.    The 8 sharps that are included in the PICC insertion kit were accounted for and disposed of in the sharps container prior to breakdown of the sterile field.    Catheter secured with Statlock, biopatch and Tegaderm dressing applied.    Findings:    Total catheter length  44 cm, with 0 cm exposed. Mid upper arm circumference is 39.5 cm. Catheter was flushed with 20 cc NS. Patient  tolerated procedure well.    Tip placement verified by [employer] . Tip placement in the SVC.      CLABSI prevention brochure left at bedside.    Patient's primary RN notified PICC is ready for use.      Comments:        Cate Horne RN PICC  [facility] - [facility]      "

## 2024-07-21 NOTE — PLAN OF CARE
Goal Outcome Evaluation:       A&Ox4. PIV in RUE failed after vanco infusion, informed provider, PICC ordered. Pain in RLE 6/10, reduced to 2/10 after IV dilaudid. Changed ABD and kerlix on RLE 1x in evening when mpted saturated with serosanguinous drainage.     Care plan to continue.        Problem: Infection  Goal: Absence of Infection Signs and Symptoms  Outcome: Progressing     Problem: Comorbidity Management  Goal: Maintenance of Behavioral Health Symptom Control  Outcome: Progressing  Goal: Blood Pressure in Desired Range  Outcome: Progressing     Problem: Pain Acute  Goal: Optimal Pain Control and Function  Outcome: Progressing

## 2024-07-21 NOTE — PLAN OF CARE
Problem: Infection  Goal: Absence of Infection Signs and Symptoms  Intervention: Prevent or Manage Infection  Recent Flowsheet Documentation  Taken 7/20/2024 2053 by Edna Monet RN  Isolation Precautions: contact precautions maintained     Problem: Comorbidity Management  Goal: Maintenance of Behavioral Health Symptom Control  Intervention: Maintain Behavioral Health Symptom Control  Recent Flowsheet Documentation  Taken 7/20/2024 2053 by Edna Monet, RN  Medication Review/Management: medications reviewed   Goal Outcome Evaluation: Pt A&Ox4. Pain on BLE rating 8/10 pain managed iv dilaudid. Serosanguinous drainage on RLE dressing. Kerlix and abd pad change. Pt reported pain on Right Hip. Up on assessment Right hip is red, warm to the touch. Borders are marked. Prn tylenol also given.

## 2024-07-21 NOTE — PROGRESS NOTES
[facility]    Medicine Progress Note - Hospitalist Service    Date of Admission:  7/16/2024    Assessment & Plan   Elizabeth Kelley is a 78 year old female admitted on 7/16/2024.   She presented to the ER with complaint of fever, fatigue, body aches and diarrhea since yesterday and wound check.  In the ER patient was again seen to be having a fever 102, labs significant for leukocytosis, blood cultures were drawn.  Patient received vancomycin and fluids in the ED  She has been admitted for acute cellulitis of the right lower extremity and sepsis.    Acute cellulitis of the right lower extremity  Sepsis(fever, mild KIKE, leukocytosis)  -improving  Patient presented to the ER with complaint of fever, fatigue, body aches and diarrhea since yesterday and wound check   Patient also experienced nausea associated with lightheadedness and few episodes of diarrhea and felt dehydrated  She was also endorsing open wounds to both legs with lymphedema which has been present for a few years and patient has been seen by vascular surgery and has had to deal with multiple infection of these wounds  Patient also was started on a new antibiotic possibly Bactrim yesterday for wound infection  Again was seen febrile with a fever of 102 in the ED  -Sepsis on admission is resolving  -Blood cultures  7/16 NGTD  -Urine culture 7/16, urogenital trang  -Chest x-ray 7/19 reviewed.  It reveals small, unchanged hiatal hernia.  Lungs are otherwise clear no visible pleural fluid or pneumothorax  -Viral panel negative for COVID, flu, respiratory syncytial virus  -Urine analysis negative for acute process  -7/19 spiked fever temperature noted 100.5.  Seen by ID per ID not worried about fevers. Continue vancomycin while here and upon discharge may need doxycycline for 5 days  -7/20, afebrile at this time.  Plan to continue antibiotics per ID.  -7/21, will consult OT for lymphedema.  She lost her IV line this morning.  Plan for  PICC line.  Continue with antibiotics per ID  -ID consulted and following. Appreciate their recommendations  -WOC consulted  -venous ultrasound of legs : negative for blood clots    Cough and tachypnea - 7/19  -Unchanged, continues to cough on and off  -Tachypnea resolved at this time  -Chest x-ray 7/19 reviewed.  It reveals small, unchanged hiatal hernia.  Lungs are otherwise clear no visible pleural fluid or pneumothorax  -Incentive spirometry  -Guaifenesin prn for cough    Dermatitis on right thigh.  Acute  -Trial of hydrocortisone cream  -Monitor    Mild KIKE : stable at this time.s/p fluids     Normocytic anemia :  Stable at this time  -Hb 9.2 today  -Monitor H&H.Type screen and transfuse PRBCs for Hb <7     History of mood disorder : Chronic. Stable.  - Continue with bupropion, citalopram     History of lymphedema of the bilateral lower extremities  Neuropathy  Restless leg syndrome  -Continue with lasix  - resumed 7/19  -Continue with spironolactone  -Continue with gabapentin  -Continue with ropinirole     History of hyperlipidemia. Chronic. Stable  -Simvastatin 40 mg at bedtime     Physical deconditioning/weakness  -Fall precautions  -PT/OT    BARRIERS TO DISCHARGE :     Still having fevers on and off  ID clearance  Will discharge home with home OT  Another 1-2 days?     Diet: Combination Diet Regular Diet Adult    DVT Prophylaxis: Enoxaparin (Lovenox) SQ  Amato Catheter: Not present  Lines: None     Cardiac Monitoring: None  Code Status: Full Code      Clinically Significant Risk Factors              # Hypoalbuminemia: Lowest albumin = 2.7 g/dL at 7/21/2024  7:55 AM, will monitor as appropriate     # Hypertension: Noted on problem list              # Severe Obesity: Estimated body mass index is 49.9 kg/m  as calculated from the following:    Height as of this encounter: 1.524 m (5').    Weight as of this encounter: 115.9 kg (255 lb 8.2 oz).        # Financial/Environmental Concerns: none         Disposition  Plan     Medically Ready for Discharge: Anticipated in 2-4 Days    Omero Shukla MD  Hospitalist Service  [facility]  Securely message with [employer] (more info)  Text page via [employer] Paging/Directory   ______________________________________________________________________  Subjective:  No new events overnight per RN.  This morning RN noticed what appears as erythematous rash on her right upper thigh tender on palpation.  She lost her peripheral line.  Plan for PICC line for continued IV antibiotics.  She states she feels better but continues to cough on and off.  Has been afebrile overnight.    Physical Exam   Vital Signs: Temp: 98  F (36.7  C) Temp src: Oral BP: (!) 143/62 (Nurse not.) Pulse: 77   Resp: 23 SpO2: 93 % O2 Device: None (Room air)    Weight: 255 lbs 8.21 oz  General: Patient is lying in bed comfortably she appears in no distress  HEENT: Head is normocephalic atraumatic eyes pupils appear equal round and reactive to light  Heart: She has a good S1 and S2 no obvious murmurs peripheral pulses are palpable.  Lungs: She has a normal respiratory effort on auscultation good air entry is noted  Abdomen: Soft nontender nondistended bowel sounds are noted  Musculoskeletal: Bilateral lower extremity lymphedema is noted erythema and some swelling noted to her right lower extremity.  Tender to touch  Skin: Please refer to nursing/wound care note for complete skin exam.    Medical Decision Making       40 MINUTES SPENT BY ME on the date of service doing chart review, history, exam, documentation & further activities per the note.  MANAGEMENT DISCUSSED with the following over the past 24 hours: staff RN and Patient       Data   Lab Results   Component Value Date    WBC 8.5 07/21/2024     Lab Results   Component Value Date    RBC 3.38 07/21/2024     Lab Results   Component Value Date    HGB 9.2 07/21/2024     Lab Results   Component Value Date    HCT 29.1 07/21/2024     Lab Results   Component  Value Date    MCV 86 07/21/2024     Lab Results   Component Value Date    MCH 27.2 07/21/2024     Lab Results   Component Value Date    MCHC 31.6 07/21/2024     Lab Results   Component Value Date    RDW 15.6 07/21/2024     Lab Results   Component Value Date     07/21/2024       Recent Labs   Lab Test 07/20/24  0957 07/20/24  0842 07/19/24  0808 07/19/24  0710 07/17/24  0738 07/17/24  0705     --   --   --   --  142   POTASSIUM 3.8  --   --   --   --  4.5   CHLORIDE 100  --   --   --   --  106   CO2 29  --   --   --   --  23   ANIONGAP 10  --   --   --   --  13   GLC 95 91   < >  --    < > 99   BUN 11.5  --   --   --   --  16.8   CR 0.78  --   --  0.84   < > 0.97*   JUNI 8.6*  --   --   --   --  8.0*    < > = values in this interval not displayed.

## 2024-07-22 ENCOUNTER — APPOINTMENT (OUTPATIENT)
Dept: OCCUPATIONAL THERAPY | Facility: HOSPITAL | Age: 79
DRG: 872 | End: 2024-07-22
Payer: COMMERCIAL

## 2024-07-22 LAB
ALBUMIN SERPL BCG-MCNC: 2.8 G/DL (ref 3.5–5.2)
ALP SERPL-CCNC: 480 U/L (ref 40–150)
ALT SERPL W P-5'-P-CCNC: 84 U/L (ref 0–50)
ANION GAP SERPL CALCULATED.3IONS-SCNC: 8 MMOL/L (ref 7–15)
AST SERPL W P-5'-P-CCNC: 63 U/L (ref 0–45)
BASOPHILS # BLD AUTO: 0.1 10E3/UL (ref 0–0.2)
BASOPHILS NFR BLD AUTO: 1 %
BILIRUB SERPL-MCNC: 0.2 MG/DL
BUN SERPL-MCNC: 11.3 MG/DL (ref 8–23)
CALCIUM SERPL-MCNC: 8.4 MG/DL (ref 8.8–10.4)
CHLORIDE SERPL-SCNC: 101 MMOL/L (ref 98–107)
CREAT SERPL-MCNC: 0.81 MG/DL (ref 0.51–0.95)
EGFRCR SERPLBLD CKD-EPI 2021: 75 ML/MIN/1.73M2
EOSINOPHIL # BLD AUTO: 0.3 10E3/UL (ref 0–0.7)
EOSINOPHIL NFR BLD AUTO: 3 %
ERYTHROCYTE [DISTWIDTH] IN BLOOD BY AUTOMATED COUNT: 15.5 % (ref 10–15)
GLUCOSE BLDC GLUCOMTR-MCNC: 119 MG/DL (ref 70–99)
GLUCOSE BLDC GLUCOMTR-MCNC: 79 MG/DL (ref 70–99)
GLUCOSE BLDC GLUCOMTR-MCNC: 92 MG/DL (ref 70–99)
GLUCOSE SERPL-MCNC: 105 MG/DL (ref 70–99)
HCO3 SERPL-SCNC: 30 MMOL/L (ref 22–29)
HCT VFR BLD AUTO: 29.6 % (ref 35–47)
HGB BLD-MCNC: 9.3 G/DL (ref 11.7–15.7)
IMM GRANULOCYTES # BLD: 0.3 10E3/UL
IMM GRANULOCYTES NFR BLD: 3 %
LYMPHOCYTES # BLD AUTO: 1.3 10E3/UL (ref 0.8–5.3)
LYMPHOCYTES NFR BLD AUTO: 14 %
MCH RBC QN AUTO: 26.8 PG (ref 26.5–33)
MCHC RBC AUTO-ENTMCNC: 31.4 G/DL (ref 31.5–36.5)
MCV RBC AUTO: 85 FL (ref 78–100)
MONOCYTES # BLD AUTO: 0.9 10E3/UL (ref 0–1.3)
MONOCYTES NFR BLD AUTO: 10 %
NEUTROPHILS # BLD AUTO: 6.3 10E3/UL (ref 1.6–8.3)
NEUTROPHILS NFR BLD AUTO: 69 %
NRBC # BLD AUTO: 0 10E3/UL
NRBC BLD AUTO-RTO: 0 /100
PLATELET # BLD AUTO: 266 10E3/UL (ref 150–450)
POTASSIUM SERPL-SCNC: 3.7 MMOL/L (ref 3.4–5.3)
PROT SERPL-MCNC: 6.2 G/DL (ref 6.4–8.3)
RBC # BLD AUTO: 3.47 10E6/UL (ref 3.8–5.2)
SODIUM SERPL-SCNC: 139 MMOL/L (ref 135–145)
WBC # BLD AUTO: 9.1 10E3/UL (ref 4–11)

## 2024-07-22 PROCEDURE — 999N000147 HC STATISTIC PT IP EVAL DEFER: Performed by: PHYSICAL THERAPIST

## 2024-07-22 PROCEDURE — 82040 ASSAY OF SERUM ALBUMIN: CPT | Performed by: HOSPITALIST

## 2024-07-22 PROCEDURE — 250N000011 HC RX IP 250 OP 636: Performed by: INTERNAL MEDICINE

## 2024-07-22 PROCEDURE — 99232 SBSQ HOSP IP/OBS MODERATE 35: CPT | Performed by: INTERNAL MEDICINE

## 2024-07-22 PROCEDURE — 250N000013 HC RX MED GY IP 250 OP 250 PS 637: Performed by: INTERNAL MEDICINE

## 2024-07-22 PROCEDURE — 258N000003 HC RX IP 258 OP 636: Performed by: INTERNAL MEDICINE

## 2024-07-22 PROCEDURE — 120N000001 HC R&B MED SURG/OB

## 2024-07-22 PROCEDURE — 97530 THERAPEUTIC ACTIVITIES: CPT | Mod: GO

## 2024-07-22 PROCEDURE — 97535 SELF CARE MNGMENT TRAINING: CPT | Mod: GO

## 2024-07-22 PROCEDURE — 85025 COMPLETE CBC W/AUTO DIFF WBC: CPT | Performed by: HOSPITALIST

## 2024-07-22 PROCEDURE — 250N000013 HC RX MED GY IP 250 OP 250 PS 637: Performed by: STUDENT IN AN ORGANIZED HEALTH CARE EDUCATION/TRAINING PROGRAM

## 2024-07-22 RX ADMIN — HYDROCORTISONE: 1 CREAM TOPICAL at 08:39

## 2024-07-22 RX ADMIN — SPIRONOLACTONE 25 MG: 25 TABLET, FILM COATED ORAL at 08:36

## 2024-07-22 RX ADMIN — ACETAMINOPHEN 975 MG: 325 TABLET ORAL at 19:08

## 2024-07-22 RX ADMIN — ENOXAPARIN SODIUM 40 MG: 40 INJECTION SUBCUTANEOUS at 12:04

## 2024-07-22 RX ADMIN — GABAPENTIN 100 MG: 100 CAPSULE ORAL at 08:37

## 2024-07-22 RX ADMIN — Medication 250 MG: at 08:37

## 2024-07-22 RX ADMIN — ROPINIROLE HYDROCHLORIDE 0.5 MG: 0.25 TABLET, FILM COATED ORAL at 08:38

## 2024-07-22 RX ADMIN — GABAPENTIN 100 MG: 100 CAPSULE ORAL at 21:23

## 2024-07-22 RX ADMIN — SIMVASTATIN 40 MG: 40 TABLET, FILM COATED ORAL at 21:24

## 2024-07-22 RX ADMIN — ACETAMINOPHEN 975 MG: 325 TABLET ORAL at 08:50

## 2024-07-22 RX ADMIN — FUROSEMIDE 20 MG: 20 TABLET ORAL at 21:24

## 2024-07-22 RX ADMIN — Medication 250 MG: at 21:23

## 2024-07-22 RX ADMIN — ROPINIROLE HYDROCHLORIDE 1 MG: 1 TABLET, FILM COATED ORAL at 21:30

## 2024-07-22 RX ADMIN — SODIUM CHLORIDE 1500 MG: 9 INJECTION, SOLUTION INTRAVENOUS at 19:10

## 2024-07-22 RX ADMIN — ENOXAPARIN SODIUM 40 MG: 40 INJECTION SUBCUTANEOUS at 22:53

## 2024-07-22 RX ADMIN — HYDROCORTISONE: 1 CREAM TOPICAL at 21:26

## 2024-07-22 RX ADMIN — HYDROMORPHONE HYDROCHLORIDE 0.2 MG: 0.2 INJECTION, SOLUTION INTRAMUSCULAR; INTRAVENOUS; SUBCUTANEOUS at 15:43

## 2024-07-22 RX ADMIN — FERROUS SULFATE TAB 325 MG (65 MG ELEMENTAL FE) 325 MG: 325 (65 FE) TAB at 08:37

## 2024-07-22 RX ADMIN — CITALOPRAM 40 MG: 40 TABLET ORAL at 12:04

## 2024-07-22 RX ADMIN — BUPROPION HYDROCHLORIDE 150 MG: 150 TABLET, FILM COATED, EXTENDED RELEASE ORAL at 08:38

## 2024-07-22 RX ADMIN — PANTOPRAZOLE SODIUM 40 MG: 40 TABLET, DELAYED RELEASE ORAL at 08:36

## 2024-07-22 RX ADMIN — HYDROMORPHONE HYDROCHLORIDE 0.2 MG: 0.2 INJECTION, SOLUTION INTRAMUSCULAR; INTRAVENOUS; SUBCUTANEOUS at 21:24

## 2024-07-22 RX ADMIN — FUROSEMIDE 20 MG: 20 TABLET ORAL at 08:37

## 2024-07-22 RX ADMIN — ASPIRIN 81 MG CHEWABLE TABLET 81 MG: 81 TABLET CHEWABLE at 08:34

## 2024-07-22 RX ADMIN — GABAPENTIN 100 MG: 100 CAPSULE ORAL at 15:03

## 2024-07-22 RX ADMIN — SODIUM CHLORIDE 1500 MG: 9 INJECTION, SOLUTION INTRAVENOUS at 00:58

## 2024-07-22 ASSESSMENT — ACTIVITIES OF DAILY LIVING (ADL)
ADLS_ACUITY_SCORE: 33

## 2024-07-22 NOTE — PROGRESS NOTES
Care Management Follow Up    Length of Stay (days): 6    Expected Discharge Date: 07/22/2024     Concerns to be Addressed:  discharge planning     Patient plan of care discussed at interdisciplinary rounds: Yes    Anticipated Discharge Disposition: Home     Anticipated Discharge Services:  none  Anticipated Discharge DME:  none    Patient/family educated on Medicare website which has current facility and service quality ratings:  no  Education Provided on the Discharge Plan:  yes  Patient/Family in Agreement with the Plan:  yes    Referrals Placed by CM/SW:    Private pay costs discussed: Not applicable    Additional Information:  SW met with Pt at bedside to discuss OT recommendation for home care. Pt denies wanting home care services at this time. Pt reports she has had OT home care in the past, and the only thing she needs assistance with is getting out of the bath. Pt reports her  is at home and is able to assist her in addition to her daughter who lives nearby and can help as well. CM following care progression to assist with safe discharge planning.    ALBARO MckeonW

## 2024-07-22 NOTE — PLAN OF CARE
Problem: Infection  Goal: Absence of Infection Signs and Symptoms  7/22/2024 0631 by Edna Monet RN  Outcome: Progressing  7/21/2024 2311 by Edna Monet RN  Outcome: Progressing  Intervention: Prevent or Manage Infection  Recent Flowsheet Documentation  Taken 7/21/2024 2308 by Edna Monet RN  Isolation Precautions: contact precautions maintained     Problem: Infection  Goal: Absence of Infection Signs and Symptoms  Intervention: Prevent or Manage Infection  Recent Flowsheet Documentation  Taken 7/21/2024 2308 by Edna Monet RN  Isolation Precautions: contact precautions maintained     Problem: Pain Acute  Goal: Optimal Pain Control and Function  Outcome: Progressing  Intervention: Prevent or Manage Pain  Recent Flowsheet Documentation  Taken 7/21/2024 2308 by Edna Monet RN  Medication Review/Management: medications reviewed  Intervention: Optimize Psychosocial Wellbeing  Recent Flowsheet Documentation  Taken 7/21/2024 2308 by Edna Monet RN  Supportive Measures:   active listening utilized   goal-setting facilitated   relaxation techniques promoted

## 2024-07-22 NOTE — PROGRESS NOTES
"SPIRITUAL HEALTH SERVICES - Brief Note  [facility] P[facility]    Referral Source: follow up    Carolina mentioned that she was resting before an appointment, so she declined a visit but welcomed me back later in the week \"if she is still here\".     Plan: [facility] to follow up as available.     Time spent with patient: 5 min     Kelley Kirk Mdiv '26    Intern      [facility] available 24/7 for emergent requests/referrals, either by paging the on-call  or by entering an ASAP/STAT consult in Epic (this will also page the on-call ).    "

## 2024-07-22 NOTE — PLAN OF CARE
Problem: Infection  Goal: Absence of Infection Signs and Symptoms  Outcome: Progressing  Intervention: Prevent or Manage Infection  Recent Flowsheet Documentation  Taken 7/21/2024 2308 by Edna Monet, RN  Isolation Precautions: contact precautions maintained     Problem: Adult Inpatient Plan of Care  Goal: Optimal Comfort and Wellbeing  Outcome: Progressing     Problem: Pain Acute  Goal: Optimal Pain Control and Function  Intervention: Prevent or Manage Pain  Recent Flowsheet Documentation  Taken 7/21/2024 2308 by Edna Monet, RN  Medication Review/Management: medications reviewed   Goal Outcome Evaluation:Pt A&Ox4. Pain on BLE rating 8/10 pain managed iv dilaudid and PRN tylenol. Serosanguinous drainage on RLE dressing.

## 2024-07-22 NOTE — PROGRESS NOTES
"[facility] Progress Note    SUBJECTIVE: Doesn't feel legs are improving, which is the sort of definition of this problem.  It does NOT respond to abx, or inpatient care plans.      REVIEW OF SYSTEMS:  Negative unless as listed above.  Social history, Family history, Medications: reviewed.    OBJECTIVE:  BP (!) 140/65 (BP Location: Right arm, Cuff Size: Adult Regular)   Pulse 80   Temp 98.8  F (37.1  C) (Oral)   Resp 20   Ht 1.524 m (5')   Wt 115.9 kg (255 lb 8.2 oz)   SpO2 92%   BMI 49.90 kg/m                PHYSICAL EXAM:  Alert, awake  Vitals tabulated above, reviewed  Neck supple without lymphadenopathy  Sclera normal color, not injected  CARDIOVASCULAR regular rate and rhythm, no murmur  Lungs CLEAR TO AUSCULTATION   Abdomen soft, NT/ND, absent HEPATOSPLENOMEGALY  Skin normal  Joints normal  Neurologic exam non focal                          Antibiotics:    Pertinent labs:    Recent Labs   Lab Test 07/21/24  0755 07/20/24  0957 07/18/24  0912   WBC 8.5 8.7 10.3   HGB 9.2* 9.3* 9.9*   HCT 29.1* 28.8* 32.1*   MCV 86 85 86    213 196       Lab Results   Component Value Date    RBC 3.72 07/18/2024     Lab Results   Component Value Date    HGB 9.9 07/18/2024     Lab Results   Component Value Date    HCT 32.1 07/18/2024     No components found for: \"MCT\"  Lab Results   Component Value Date    MCV 86 07/18/2024     Lab Results   Component Value Date    MCH 26.6 07/18/2024     Lab Results   Component Value Date    MCHC 30.8 07/18/2024     Lab Results   Component Value Date    RDW 15.5 07/18/2024     Lab Results   Component Value Date     07/18/2024       Last Comprehensive Metabolic Panel:  Sodium   Date Value Ref Range Status   07/22/2024 139 135 - 145 mmol/L Final     Potassium   Date Value Ref Range Status   07/22/2024 3.7 3.4 - 5.3 mmol/L Final   06/24/2022 4.4 3.5 - 5.0 mmol/L Final     Chloride   Date Value Ref Range Status   07/22/2024 101 98 - 107 mmol/L Final   06/24/2022 " 100 98 - 107 mmol/L Final     Carbon Dioxide (CO2)   Date Value Ref Range Status   07/22/2024 30 (H) 22 - 29 mmol/L Final   06/24/2022 25 22 - 31 mmol/L Final     Anion Gap   Date Value Ref Range Status   07/22/2024 8 7 - 15 mmol/L Final   06/24/2022 14 5 - 18 mmol/L Final     Glucose   Date Value Ref Range Status   07/22/2024 105 (H) 70 - 99 mg/dL Final   06/24/2022 95 70 - 125 mg/dL Final     GLUCOSE BY METER POCT   Date Value Ref Range Status   07/22/2024 92 70 - 99 mg/dL Final     Urea Nitrogen   Date Value Ref Range Status   07/22/2024 11.3 8.0 - 23.0 mg/dL Final   06/24/2022 16 8 - 28 mg/dL Final     Creatinine   Date Value Ref Range Status   07/22/2024 0.81 0.51 - 0.95 mg/dL Final     GFR Estimate   Date Value Ref Range Status   07/22/2024 75 >60 mL/min/1.73m2 Final     Comment:     eGFR calculated using 2021 CKD-EPI equation.   09/26/2020 >60 >60 mL/min/1.73m2 Final     Calcium   Date Value Ref Range Status   07/22/2024 8.4 (L) 8.8 - 10.4 mg/dL Final     Comment:     Reference intervals for this test were updated on 7/16/2024 to reflect our healthy population more accurately. There may be differences in the flagging of prior results with similar values performed with this method. Those prior results can be interpreted in the context of the updated reference intervals.       Liver Function Studies -   Recent Labs   Lab Test 07/17/24  0705   PROTTOTAL 5.5*   ALBUMIN 3.1*   BILITOTAL 0.2   ALKPHOS 135   AST 44   ALT 34       Erythrocyte Sedimentation Rate   Date Value Ref Range Status   10/20/2021 55 (H) 0 - 20 mm/hr Final       CRP   Date Value Ref Range Status   10/11/2021 2.8 (H) 0.0-<0.8 mg/dL Final     C-Reactive Protein High Sensitivity   Date Value Ref Range Status   10/20/2021 31.7 (H) 0.0 - 3.0 mg/L Final     Comment:     Low risk < 1.0 mg/L  Average risk 1.0 to 3.0 mg/L  High risk > 3.0 mg/L  Acute inflamation > 10.0 mg/L               MICROBIOLOGY  DATA:        IMAGING/RADIOLOGY:          ASSESSMENT:  Stasis BLE  Doubt has infection (or much of one)    RECOMMENDATION:   Latest Reference Range & Units 07/16/24 13:58 07/17/24 07:32 07/18/24 09:12   WBC 4.0 - 11.0 10e3/uL 15.6 (H) 13.5 (H) 10.3   Vascular clinic can see her in 5 wks per their message to me.  Schedule appt  I am not worried about fevers  Vanco while here  Doxy for 5 days   Send home  Sign off  Wound care--call clinic and get on cancellation list.     SUMA Macias MD  Office [phone] option 2 to desk staff

## 2024-07-22 NOTE — PLAN OF CARE
Occupational Therapy Discharge Summary    Reason for therapy discharge:    All goals and outcomes met, no further needs identified.  Patient/family request discontinuation of services.    Progress towards therapy goal(s). See goals on Care Plan in [facility] health record for goal details.  Goals met    Therapy recommendation(s):    No further therapy is recommended. Would benefit from home OT to maximize I/ADL safety but pt declines.

## 2024-07-22 NOTE — UTILIZATION REVIEW
Admission Status; Secondary Review Determination   INSURANCE DENIAL  Under the authority of the Utilization Management Committee, the utilization review process indicated a secondary review on the above patient. The review outcome is based on review of the medical records, discussions with staff, and applying clinical experience noted on the date of the review.   (x) Inpatient Status Appropriate - This patient's medical care is consistent with medical management for inpatient care and reasonable inpatient medical practice.     RATIONALE FOR DETERMINATION   77 yo female who presented to the ED with bilateral leg cellulitis and Sepsis ( fever, leukocytosis, tachypnea acute kidney injury).  Started on IV vancomycin and IVF.  Infectious disease consulted and has recommended continuation of IV vancomycin (currently still on).  She has intermittently spiked fevers that required close inpatient monitoring and re-evaluation for potential fever source besides leg cellulitis.  Leukocytosis did not resolve until 7/18/24.   Her PTA diuretics were held on admission in the setting of KIKE, but she did develop some hypoxic respiratory failure requiring further inpatient treatment with supplemental oxygen and re-initiation of scheduled diuretic therapy.    At the time of admission with the information available to the attending physician more than 2 nights Hospital complex care was anticipated, based on patient risk of adverse outcome if treated as outpatient and complex care required. Inpatient admission is appropriate based on the Medicare guidelines.   The information on this document is developed by the utilization review team in order for the business office to ensure compliance. This only denotes the appropriateness of proper admission status and does not reflect the quality of care rendered.   The definitions of Inpatient Status and Observation Status used in making the determination above are those provided in the CMS  Coverage Manual, Chapter 1 and Chapter 6, section 70.4.     Sincerely,     Kaitlynn Gibbons, DO  Utilization Review  Physician Advisor

## 2024-07-22 NOTE — PROGRESS NOTES
Lymphedema Therapy: Orders received. Chart reviewed and discussed with care team.? Lymphedema Therapy not indicated due to patient declining. Spoke with patient, patient reporting increased pain and sensitivity in B LE, especially with dressing changes.? Will complete orders.     Juanita Gray, PT  7/22/2024

## 2024-07-22 NOTE — PROGRESS NOTES
[facility]    Medicine Progress Note - Hospitalist Service    Date of Admission:  7/16/2024    Assessment & Plan   Elizabeth Kelley is a 78 year old female admitted on 7/16/2024.   She presented to the ER with complaint of fever, fatigue, body aches and diarrhea since yesterday and wound check.  In the ER patient was again seen to be having a fever 102, labs significant for leukocytosis, blood cultures were drawn.  Patient received vancomycin and fluids in the ED  She has been admitted for acute cellulitis of the right lower extremity and sepsis.  7/22:  -No new changes at this time.  Patient making good progress.  Anticipate she will discharge tomorrow with oral antibiotics    Acute cellulitis of the right lower extremity  Sepsis(fever, mild KIKE, leukocytosis)  -improving  Patient presented to the ER with complaint of fever, fatigue, body aches and diarrhea since yesterday and wound check   Patient also experienced nausea associated with lightheadedness and few episodes of diarrhea and felt dehydrated  She was also endorsing open wounds to both legs with lymphedema which has been present for a few years and patient has been seen by vascular surgery and has had to deal with multiple infection of these wounds  Patient also was started on a new antibiotic possibly Bactrim yesterday for wound infection  Again was seen febrile with a fever of 102 in the ED  -Sepsis on admission is resolving  -Blood cultures  7/16 NGTD  -Urine culture 7/16, urogenital trang  -Chest x-ray 7/19 reviewed.  It reveals small, unchanged hiatal hernia.  Lungs are otherwise clear no visible pleural fluid or pneumothorax  -Viral panel negative for COVID, flu, respiratory syncytial virus  -Urine analysis negative for acute process  -7/19 spiked fever temperature noted 100.5.  Seen by ID per ID not worried about fevers. Continue vancomycin while here and upon discharge may need doxycycline for 5 days  -7/20, afebrile at this  time.  Plan to continue antibiotics per ID.  -7/21, will consult OT for lymphedema.  She lost her IV line this morning.  Plan for PICC line.  Continue with antibiotics per ID  -7/22.  Making good progress.  ID has signed off and recommends oral doxycycline for 5 days  -ID consulted and following. Appreciate their recommendations  -WOC consulted  -venous ultrasound of legs : negative for blood clots    Cough and tachypnea - 7/19  -Cough improved  -Tachypnea resolved at this time  -Chest x-ray 7/19 reviewed.  It reveals small, unchanged hiatal hernia.  Lungs are otherwise clear no visible pleural fluid or pneumothorax  -Incentive spirometry  -Guaifenesin prn for cough    Dermatitis on right thigh.  Improving  -Continue with hydrocortisone cream  -Monitor    Mild KIKE : stable at this time.s/p fluids     Normocytic anemia :  Stable at this time  -Hb 9.2 today  -Monitor H&H.Type screen and transfuse PRBCs for Hb <7     History of mood disorder : Chronic. Stable.  - Continue with bupropion, citalopram     History of lymphedema of the bilateral lower extremities  Neuropathy  Restless leg syndrome  -Continue with lasix  - resumed 7/19  -Continue with spironolactone  -Continue with gabapentin  -Continue with ropinirole     History of hyperlipidemia. Chronic. Stable  -Simvastatin 40 mg at bedtime     Physical deconditioning/weakness  -Fall precautions  -PT/OT    BARRIERS TO DISCHARGE :     Still having fevers on and off  ID clearance  Will discharge home with home OT  Another 1-2 days?     Diet: Combination Diet Regular Diet Adult    DVT Prophylaxis: Enoxaparin (Lovenox) SQ  Amato Catheter: Not present  Lines: PRESENT      PICC 07/21/24 Double Lumen Left Other (Comment) access-Site Assessment: WDL    Cardiac Monitoring: None  Code Status: Full Code      Clinically Significant Risk Factors              # Hypoalbuminemia: Lowest albumin = 2.7 g/dL at 7/21/2024  7:55 AM, will monitor as appropriate     # Hypertension: Noted on  problem list              # Severe Obesity: Estimated body mass index is 49.9 kg/m  as calculated from the following:    Height as of this encounter: 1.524 m (5').    Weight as of this encounter: 115.9 kg (255 lb 8.2 oz).        # Financial/Environmental Concerns: none         Disposition Plan     Medically Ready for Discharge: Anticipated in 2-4 Days    Omero Shukla MD  Hospitalist Service  [facility]  Securely message with [employer] (more info)  Text page via [employer] Paging/Directory   ______________________________________________________________________  Subjective:  Patient is in bed comfortably.  She states she is progressing well.  On IV antibiotics.  ID has signed off.  She reports no new complaints would like one more day to gain more strength before being discharged to home.  Per  she is declining home care    Physical Exam   Vital Signs: Temp: 98.8  F (37.1  C) Temp src: Oral BP: (!) 140/65 Pulse: 80   Resp: 20 SpO2: 92 % O2 Device: None (Room air)    Weight: 255 lbs 8.21 oz  General: Patient is lying in bed comfortably she appears in no distress  HEENT: Head is normocephalic atraumatic eyes pupils appear equal round and reactive to light  Heart: She has a good S1 and S2 no obvious murmurs peripheral pulses are palpable.  Lungs: She has a normal respiratory effort on auscultation good air entry is noted  Abdomen: Soft nontender nondistended bowel sounds are noted  Musculoskeletal: Bilateral lower extremity lymphedema is noted erythema and some swelling noted to her right lower extremity.  Tender to touch  Skin: Please refer to nursing/wound care note for complete skin exam.    Medical Decision Making       45 MINUTES SPENT BY ME on the date of service doing chart review, history, exam, documentation & further activities per the note.  MANAGEMENT DISCUSSED with the following over the past 24 hours: staff RN and Patient       Data     Lab Results   Component Value Date     WBC 8.5 07/21/2024     Lab Results   Component Value Date    RBC 3.38 07/21/2024     Lab Results   Component Value Date    HGB 9.2 07/21/2024     Lab Results   Component Value Date    HCT 29.1 07/21/2024     Lab Results   Component Value Date    MCV 86 07/21/2024     Lab Results   Component Value Date    MCH 27.2 07/21/2024     Lab Results   Component Value Date    MCHC 31.6 07/21/2024     Lab Results   Component Value Date    RDW 15.6 07/21/2024     Lab Results   Component Value Date     07/21/2024     Last Comprehensive Metabolic Panel:  Sodium   Date Value Ref Range Status   07/22/2024 139 135 - 145 mmol/L Final     Potassium   Date Value Ref Range Status   07/22/2024 3.7 3.4 - 5.3 mmol/L Final   06/24/2022 4.4 3.5 - 5.0 mmol/L Final     Chloride   Date Value Ref Range Status   07/22/2024 101 98 - 107 mmol/L Final   06/24/2022 100 98 - 107 mmol/L Final     Carbon Dioxide (CO2)   Date Value Ref Range Status   07/22/2024 30 (H) 22 - 29 mmol/L Final   06/24/2022 25 22 - 31 mmol/L Final     Anion Gap   Date Value Ref Range Status   07/22/2024 8 7 - 15 mmol/L Final   06/24/2022 14 5 - 18 mmol/L Final     Glucose   Date Value Ref Range Status   07/22/2024 105 (H) 70 - 99 mg/dL Final   06/24/2022 95 70 - 125 mg/dL Final     GLUCOSE BY METER POCT   Date Value Ref Range Status   07/22/2024 92 70 - 99 mg/dL Final     Urea Nitrogen   Date Value Ref Range Status   07/22/2024 11.3 8.0 - 23.0 mg/dL Final   06/24/2022 16 8 - 28 mg/dL Final     Creatinine   Date Value Ref Range Status   07/22/2024 0.81 0.51 - 0.95 mg/dL Final     GFR Estimate   Date Value Ref Range Status   07/22/2024 75 >60 mL/min/1.73m2 Final     Comment:     eGFR calculated using 2021 CKD-EPI equation.   09/26/2020 >60 >60 mL/min/1.73m2 Final     Calcium   Date Value Ref Range Status   07/22/2024 8.4 (L) 8.8 - 10.4 mg/dL Final     Comment:     Reference intervals for this test were updated on 7/16/2024 to reflect our healthy population more accurately.  There may be differences in the flagging of prior results with similar values performed with this method. Those prior results can be interpreted in the context of the updated reference intervals.     Bilirubin Total   Date Value Ref Range Status   07/22/2024 0.2 <=1.2 mg/dL Final     Alkaline Phosphatase   Date Value Ref Range Status   07/22/2024 480 (H) 40 - 150 U/L Final     ALT   Date Value Ref Range Status   07/22/2024 84 (H) 0 - 50 U/L Final     AST   Date Value Ref Range Status   07/22/2024 63 (H) 0 - 45 U/L Final

## 2024-07-23 VITALS
SYSTOLIC BLOOD PRESSURE: 130 MMHG | OXYGEN SATURATION: 94 % | TEMPERATURE: 98.5 F | BODY MASS INDEX: 50.16 KG/M2 | DIASTOLIC BLOOD PRESSURE: 81 MMHG | HEIGHT: 60 IN | HEART RATE: 73 BPM | RESPIRATION RATE: 18 BRPM | WEIGHT: 255.51 LBS

## 2024-07-23 LAB
ALBUMIN SERPL BCG-MCNC: 3 G/DL (ref 3.5–5.2)
ALP SERPL-CCNC: 541 U/L (ref 40–150)
ALT SERPL W P-5'-P-CCNC: 85 U/L (ref 0–50)
ANION GAP SERPL CALCULATED.3IONS-SCNC: 12 MMOL/L (ref 7–15)
AST SERPL W P-5'-P-CCNC: 53 U/L (ref 0–45)
BASOPHILS # BLD AUTO: 0.1 10E3/UL (ref 0–0.2)
BASOPHILS NFR BLD AUTO: 1 %
BILIRUB SERPL-MCNC: 0.2 MG/DL
BUN SERPL-MCNC: 13 MG/DL (ref 8–23)
CALCIUM SERPL-MCNC: 9 MG/DL (ref 8.8–10.4)
CHLORIDE SERPL-SCNC: 100 MMOL/L (ref 98–107)
CREAT SERPL-MCNC: 0.85 MG/DL (ref 0.51–0.95)
EGFRCR SERPLBLD CKD-EPI 2021: 71 ML/MIN/1.73M2
EOSINOPHIL # BLD AUTO: 0.4 10E3/UL (ref 0–0.7)
EOSINOPHIL NFR BLD AUTO: 4 %
ERYTHROCYTE [DISTWIDTH] IN BLOOD BY AUTOMATED COUNT: 15.4 % (ref 10–15)
GLUCOSE BLDC GLUCOMTR-MCNC: 101 MG/DL (ref 70–99)
GLUCOSE SERPL-MCNC: 99 MG/DL (ref 70–99)
HCO3 SERPL-SCNC: 30 MMOL/L (ref 22–29)
HCT VFR BLD AUTO: 31 % (ref 35–47)
HGB BLD-MCNC: 9.7 G/DL (ref 11.7–15.7)
IMM GRANULOCYTES # BLD: 0.5 10E3/UL
IMM GRANULOCYTES NFR BLD: 4 %
LYMPHOCYTES # BLD AUTO: 1.7 10E3/UL (ref 0.8–5.3)
LYMPHOCYTES NFR BLD AUTO: 15 %
MCH RBC QN AUTO: 26.8 PG (ref 26.5–33)
MCHC RBC AUTO-ENTMCNC: 31.3 G/DL (ref 31.5–36.5)
MCV RBC AUTO: 86 FL (ref 78–100)
MONOCYTES # BLD AUTO: 1.2 10E3/UL (ref 0–1.3)
MONOCYTES NFR BLD AUTO: 11 %
NEUTROPHILS # BLD AUTO: 7.5 10E3/UL (ref 1.6–8.3)
NEUTROPHILS NFR BLD AUTO: 66 %
NRBC # BLD AUTO: 0 10E3/UL
NRBC BLD AUTO-RTO: 0 /100
PLATELET # BLD AUTO: 318 10E3/UL (ref 150–450)
POTASSIUM SERPL-SCNC: 3.6 MMOL/L (ref 3.4–5.3)
PROT SERPL-MCNC: 6.8 G/DL (ref 6.4–8.3)
RBC # BLD AUTO: 3.62 10E6/UL (ref 3.8–5.2)
SODIUM SERPL-SCNC: 142 MMOL/L (ref 135–145)
VANCOMYCIN SERPL-MCNC: 19.1 UG/ML
WBC # BLD AUTO: 11.4 10E3/UL (ref 4–11)

## 2024-07-23 PROCEDURE — 250N000011 HC RX IP 250 OP 636: Performed by: INTERNAL MEDICINE

## 2024-07-23 PROCEDURE — 250N000013 HC RX MED GY IP 250 OP 250 PS 637: Performed by: INTERNAL MEDICINE

## 2024-07-23 PROCEDURE — 80053 COMPREHEN METABOLIC PANEL: CPT | Performed by: HOSPITALIST

## 2024-07-23 PROCEDURE — 250N000013 HC RX MED GY IP 250 OP 250 PS 637: Performed by: STUDENT IN AN ORGANIZED HEALTH CARE EDUCATION/TRAINING PROGRAM

## 2024-07-23 PROCEDURE — 80202 ASSAY OF VANCOMYCIN: CPT | Performed by: HOSPITALIST

## 2024-07-23 PROCEDURE — G0463 HOSPITAL OUTPT CLINIC VISIT: HCPCS

## 2024-07-23 PROCEDURE — 99239 HOSP IP/OBS DSCHRG MGMT >30: CPT | Performed by: HOSPITALIST

## 2024-07-23 PROCEDURE — 258N000003 HC RX IP 258 OP 636: Performed by: INTERNAL MEDICINE

## 2024-07-23 PROCEDURE — 85004 AUTOMATED DIFF WBC COUNT: CPT | Performed by: HOSPITALIST

## 2024-07-23 RX ORDER — ACETAMINOPHEN 325 MG/1
975 TABLET ORAL EVERY 6 HOURS PRN
Qty: 35 TABLET | Refills: 0 | Status: SHIPPED | OUTPATIENT
Start: 2024-07-23 | End: 2024-08-22

## 2024-07-23 RX ORDER — OXYCODONE HYDROCHLORIDE 5 MG/1
5 TABLET ORAL EVERY 6 HOURS PRN
Status: SHIPPED | OUTPATIENT
Start: 2024-07-23

## 2024-07-23 RX ORDER — DOXYCYCLINE HYCLATE 100 MG
100 TABLET ORAL 2 TIMES DAILY
Qty: 10 TABLET | Refills: 0 | Status: SHIPPED | OUTPATIENT
Start: 2024-07-23 | End: 2024-07-28

## 2024-07-23 RX ADMIN — BUPROPION HYDROCHLORIDE 150 MG: 150 TABLET, FILM COATED, EXTENDED RELEASE ORAL at 09:15

## 2024-07-23 RX ADMIN — HYDROCORTISONE: 1 CREAM TOPICAL at 09:15

## 2024-07-23 RX ADMIN — SODIUM CHLORIDE 1500 MG: 9 INJECTION, SOLUTION INTRAVENOUS at 13:06

## 2024-07-23 RX ADMIN — FUROSEMIDE 20 MG: 20 TABLET ORAL at 09:15

## 2024-07-23 RX ADMIN — SPIRONOLACTONE 25 MG: 25 TABLET, FILM COATED ORAL at 09:16

## 2024-07-23 RX ADMIN — GABAPENTIN 100 MG: 100 CAPSULE ORAL at 13:06

## 2024-07-23 RX ADMIN — CITALOPRAM 40 MG: 40 TABLET ORAL at 13:06

## 2024-07-23 RX ADMIN — HYDROMORPHONE HYDROCHLORIDE 0.2 MG: 0.2 INJECTION, SOLUTION INTRAMUSCULAR; INTRAVENOUS; SUBCUTANEOUS at 10:19

## 2024-07-23 RX ADMIN — ENOXAPARIN SODIUM 40 MG: 40 INJECTION SUBCUTANEOUS at 10:24

## 2024-07-23 RX ADMIN — PANTOPRAZOLE SODIUM 40 MG: 40 TABLET, DELAYED RELEASE ORAL at 09:15

## 2024-07-23 RX ADMIN — GABAPENTIN 100 MG: 100 CAPSULE ORAL at 09:15

## 2024-07-23 RX ADMIN — ACETAMINOPHEN 975 MG: 325 TABLET ORAL at 05:49

## 2024-07-23 RX ADMIN — ROPINIROLE HYDROCHLORIDE 0.5 MG: 0.25 TABLET, FILM COATED ORAL at 09:15

## 2024-07-23 RX ADMIN — Medication 250 MG: at 09:16

## 2024-07-23 RX ADMIN — ASPIRIN 81 MG CHEWABLE TABLET 81 MG: 81 TABLET CHEWABLE at 09:15

## 2024-07-23 ASSESSMENT — ACTIVITIES OF DAILY LIVING (ADL)
ADLS_ACUITY_SCORE: 32
ADLS_ACUITY_SCORE: 33
ADLS_ACUITY_SCORE: 32
ADLS_ACUITY_SCORE: 33

## 2024-07-23 NOTE — CONSULTS
[facility] Nurse Inpatient Assessment     Consulted for: wound on both legs    Summary:     Patient History (according to provider note(s):      78 year old female presents for evaluation of bodyaches, fever, nausea, lightheadedness and concerns for possible wound infection on her right lower extremity.  Patient is chronic lymphedema of bilateral lower extremities, that appears to be worse on the right, has a large ulcer on the right lower extremity that is followed closely by the wound clinic, had good photodocumentation and examination yesterday in the wound clinic, those notes have been reviewed.  They were concerned for worsening of her chronic ulcer on the right lower extremity thus was started on Bactrim.  Today patient spontaneously developed a fever while driving and began feeling very unwell.  Patient febrile to 102.5 upon arrival, though otherwise hemodynamically stable.  Fever treated with Tylenol.  White count mildly elevated at 15.6, though lactic acid normal at 1.2.  Urinalysis without signs of infection, COVID/flu/RSV negative.  No significant respiratory symptoms, thus suspect pneumonia much less likely, though patient has had bad pneumonia in the past during a prolonged hospitalization thus will obtain a chest x-ray.  Would favor possible sepsis secondary to this worsening leg ulceration/cellulitis.  Patient also reporting some diarrhea, thus will obtain stool studies as well given that she recently started antibiotics and is now having new diarrhea.  Started on vancomycin for presumed soft tissue infection of the right lower extremity with signs of sepsis.  Will admit to hospitalist service.     Prior to making a final disposition on this patient the results of patient's tests and other diagnostic studies were discussed with the patient. All questions were answered. Patient expressed understanding of the plan and was amenable to it.    Assessment:      Wound location:  Legs              7/17  RLE                                                                                                                                                                                                                 BLE                                                          LLE      7/23 LLE    Last photo: 7/23 - RLE was bloody, unable to get good photos as woc on own. RLE concentrated to Lateral calf area and medial calf with a smaller spot. The ankle and foot are much improved as well as the area below the knee. We took a break from the edemawear today to the RLE as patient reported increased pain.  Wound due to: Venous Ulcer and lymphedema  Wound history/plan of care: is seen at a wound clinic for leg wraps and care  Wound base: 20 % non-granular tissue, 80 % granulation tissue     Palpation of the wound bed: normal      Drainage: moderate to RLE, scant to LLE     Description of drainage: serous and serosanguinous - mostly serous     Measurements (length x width x depth, in cm): R lateral shin measures 14 x 11 x 0.1cm ; R medial knee 3.2 x 2 x 0.3cm; L shin measures 1 x 1 x 0.1cm, posterior measures 1.3 x 1 x 0.1cm     Tunneling: N/A     Undermining: N/A  Periwound skin: Denuded, Dry/scaly, Erythema- blanchable, and Superficial erosion      Color: pink and red      Temperature: warm  Odor: mild  Pain: severe, Irritable or crying out at intervals, tension to hands, feet and body, facial expression of distress, and during dressing change, throbbing, shooting, radiating, and tender  Pain interventions prior to dressing change: oral tylenol and slow and gentle cares   Treatment goal: Drainage control, Infection control/prevention, Protection, and Promote epidermal migration  STATUS: evolving and improving  Supplies ordered: gathered     Edema decreasing. Patient will need continued wrap support with outpatient clinic.     Treatment Plan:     RLE:  Soak open wounds in Vashe moistened gauze, pat  "dry  Apply lotion to intact skin  Apply aquacel ag to open areas   Cover with abd pads and secure with 2 kerlix rolls  Change T-F    LLE   Soak open skin with vashe moistened gauze, pat dry  Apply lotion to intact skin  Apply Blue Edemawear from foot to knee, and Red edemawear to thigh  Wrap lower leg with one kerlix roll from toes to knee.   Change T-F        EdemaWear stockings: Use and Care    Rationale for use:   Decreases edema by improving lymphatic and venous function    Safe and gentle compression  Enhances wound healing  Protects skin    General:   EdemaWear can be worn 24/7, but should be removed at least daily for skin inspection and cares    In order to be effective, EdemaWear should DIRECTLY contact the skin as much as possible -- the mesh weave promotes lymphatic drainage when it is pressed into the skin  Choose appropriate size EdemaWear based on leg (or arm) circumference - see table for guidelines  EdemaWear LITE is only for especially fragile or painful skin  Cleanse and moisturize any intact or scaly skin before applying EdemaWear  Ok to apply additional compression over the EdemaWear (ie Lymph wraps)    Application:   Apply the EdemaWear from base of toes to knee, or above knee if tolerated and it is a long stocking  Create a wide 3\" cuff at the top if needed to prevent rolling down  Only trim the stocking if it is excessively long; do NOT cut in half; can often fold over excess length onto foot    When wounds are present:  Wound dressings that directly treat the wound bed can be applied under the EdemaWear  Any additional cover dressings (dry gauze, ABD pads, Kerlix, etc) should be applied ON TOP of the stockings whenever feasible   Stockings may get soiled with drainage and will need to be washed; ensure an extra clean, dry pair always available  May need two people to apply the stocking - bunch up stocking and pull against each other, lifting over wounds    Care:  When stockings are soiled, DO " "NOT THROW AWAY, hand wash with mild soap, rinse, hang dry  Replace approximately every 4 to 6 months        EdemaWear size Max circumference Stripe color PS # # stockings per pack   Small 18\"  (45cm) navy 091086 2   Medium 30\"  (75cm) yellow 000330 1   Large 46\"  (115cm) red [id number] 1   X Large 60\"  (150cm) aqua [id number] 1   Small LITE 24\"  (60cm) purple [id number] 2   Medium LITE 36\"  (90cm) orange [id number] 1     RLE  - Yellow from toes to knee, red from knee to upper thigh  LLE  - Blue from toes to knee, red from knee to upper thigh      Orders: Written    RECOMMEND PRIMARY TEAM ORDER:  return to outpatient wound care team  Education provided: plan of care  Discussed plan of care with: Patient, Family, and Nurse  [facility] nurse follow-up plan: Tuesday/Friday  Notify WOC if wound(s) deteriorate.  Nursing to notify the Provider(s) and re-consult the WO Nurse if new skin concern.    DATA:     Current support surface: Standard  Standard Isoflex gel  Containment of urine/stool: Incontinent pad in bed and Suction based external urinary catheter   BMI: Body mass index is 49.9 kg/m .   Active diet order: Orders Placed This Encounter      Combination Diet Regular Diet Adult     Output: I/O last 3 completed shifts:  In: -   Out: 2900 [Urine:2900]     Labs:   Recent Labs   Lab 07/23/24  0628   ALBUMIN 3.0*   HGB 9.7*   WBC 11.4*     Pressure injury risk assessment:   Sensory Perception: 3-->slightly limited  Moisture: 3-->occasionally moist  Activity: 3-->walks occasionally  Mobility: 3-->slightly limited  Nutrition: 3-->adequate  Friction and Shear: 2-->potential problem  Ajith Score: 17    BRADEN Blanco RN CWOCN  Pager no longer in use, please contact through [employer] group: [email or web address] Group      "

## 2024-07-23 NOTE — SIGNIFICANT EVENT
Orders to discharge patient. Discussed discharge instructions with patient. Patient verbalized understanding of instructions. Patient discharged to home with significant other. All belongings in hand.

## 2024-07-23 NOTE — DISCHARGE INSTRUCTIONS
WOC DISCHARGE INSTRUCTIONS:  RLE:  Soak open wounds in Vashe moistened gauze, pat dry  Apply lotion to intact skin  Apply aquacel ag to open areas   Cover with abd pads and secure with 2 kerlix rolls  Change T-F    LLE   Soak open skin with vashe moistened gauze, pat dry  Apply lotion to intact skin  Apply Blue Edemawear from foot to knee, and Red edemawear to thigh  Wrap lower leg with one kerlix roll from toes to knee.   Change T-F

## 2024-07-23 NOTE — DISCHARGE SUMMARY
[facility]  Hospitalist Discharge Summary      Date of Admission:  7/16/2024  Date of Discharge:  7/23/2024  Discharging Provider: Omero Shukla MD  Discharge Service: Hospitalist Service    Discharge Diagnoses   Acute cellulitis of the right lower extremity. Improving, continue with oral antibiotics for a few more days per ID  Sepsis(fever, mild KKIE, leukocytosis),resolving at this time  Cough and tachypnea - 7/19 . Improving  Dermatitis on right thigh.  Improving   Mild KIKE : stable at this time.s/p fluids   History of mood disorder   History of lymphedema of the bilateral lower extremities  Neuropathy  Restless leg syndrome  History of hyperlipidemia. Chronic. Stable   Physical deconditioning/weakness     Clinically Significant Risk Factors     # Severe Obesity: Estimated body mass index is 49.9 kg/m  as calculated from the following:    Height as of this encounter: 1.524 m (5').    Weight as of this encounter: 115.9 kg (255 lb 8.2 oz).       Follow-ups Needed After Discharge   Follow-up Appointments     Follow-up and recommended labs and tests       Follow up with primary care provider, Physician No Ref-Primary, within 7   days for hospital follow- up.  The following labs/tests are recommended:   CBC and CMP.            Unresulted Labs Ordered in the Past 30 Days of this Admission       No orders found from 6/16/2024 to 7/17/2024.            Discharge Disposition   Discharged to home  Condition at discharge: Stable    Hospital Course   Elizabeth Kelley is a 78 year old female admitted on 7/16/2024.   She presented to the ER with complaint of fever, fatigue, body aches and diarrhea since yesterday and wound check.  In the ER patient was again seen to be having a fever 102, labs significant for leukocytosis, blood cultures were drawn.  Patient received vancomycin and fluids in the ED  She was admitted for acute cellulitis of the right lower extremity and sepsis.    Acute cellulitis of the  right lower extremity  Sepsis(fever, mild KIKE, leukocytosis)  -improving  Patient presented to the ER with complaint of fever, fatigue, body aches and diarrhea since yesterday and wound check   Patient also experienced nausea associated with lightheadedness and few episodes of diarrhea and felt dehydrated  She was also endorsing open wounds to both legs with lymphedema which has been present for a few years and patient has been seen by vascular surgery and has had to deal with multiple infection of these wounds  Patient also was started on a new antibiotic possibly Bactrim yesterday for wound infection  Again was seen febrile with a fever of 102 in the ED  -Sepsis on admission is resolving  -Blood cultures  7/16 NGTD  -Urine culture 7/16, urogenital trang  -Chest x-ray 7/19 reviewed.  It reveals small, unchanged hiatal hernia.  Lungs are otherwise clear no visible pleural fluid or pneumothorax  -Viral panel negative for COVID, flu, respiratory syncytial virus  -Urine analysis negative for acute process  -7/19 spiked fever temperature noted 100.5.  Seen by ID per ID not worried about fevers. Continue vancomycin while here and upon discharge may need doxycycline for 5 days  -7/20, afebrile at this time.  Plan to continue antibiotics per ID.  -7/21, consulted OT for lymphedema.  She lost her IV line this morning.  Offered  PICC line.  Continue with antibiotics per ID  -7/22.  Making good progress.  ID has signed off and recommended oral doxycycline for 5 days  -WOC consulted and has been following  -venous ultrasound of legs : negative for blood clots  -7/23.  She reports she feels better and is ready to be discharged.  Offered home health services but she declined.    Cough and tachypnea - 7/19  -Cough improved  -Tachypnea resolved at this time  -Chest x-ray 7/19 reviewed.  It reveals small, unchanged hiatal hernia.  Lungs are otherwise clear no visible pleural fluid or pneumothorax  -Incentive  spirometry  -Guaifenesin prn for cough    Dermatitis on right thigh.  Improving  -Continue with hydrocortisone cream  -Monitor    Mild KIKE : stable at this time.s/p fluids     Normocytic anemia :  Stable at this time  -Hb stable today at 9.7     History of mood disorder : Chronic. Stable.  - Continue with bupropion, citalopram     History of lymphedema of the bilateral lower extremities  Neuropathy  Restless leg syndrome  -Continue with lasix  - resumed 7/19  -Continue with spironolactone  -Continue with gabapentin  -Continue with ropinirole     History of hyperlipidemia. Chronic. Stable  -Simvastatin 40 mg at bedtime     Physical deconditioning/weakness  -Fall precautions  -PT/OT  Today patient reports she feels much better and is ready to be discharged to home.  Offered home health but she declined.  I advised her to follow-up with primary care doctor in 3 to 5 days.  She should continue oral antibiotics per ID.  She should return to hospital or go to the nearest emergency room in case of any acute changes.  She voiced understanding and is agreeable to this discharge planning.    Diet: Combination Diet Regular Diet Adult    DVT Prophylaxis: Enoxaparin (Lovenox) SQ  Amato Catheter: Not present  Lines: PRESENT      PICC 07/21/24 Double Lumen Left Other (Comment) access-Site Assessment: WDL    Cardiac Monitoring: None  Code Status: Full Code      Consultations This Hospital Stay   PHARMACY TO DOSE VANCO  CARE MANAGEMENT / SOCIAL WORK IP CONSULT  PHARMACY TO DOSE VANCO  INFECTIOUS DISEASES IP CONSULT  LYMPHEDEMA THERAPY IP CONSULT  WOUND OSTOMY CONTINENCE NURSE  IP CONSULT  PHYSICAL THERAPY ADULT IP CONSULT  OCCUPATIONAL THERAPY ADULT IP CONSULT  VASCULAR ACCESS ADULT IP CONSULT  LYMPHEDEMA THERAPY IP CONSULT  SPIRITUAL HEALTH SERVICES IP CONSULT    Code Status   Full Code    Time Spent on this Encounter   IOmero MD, personally saw the patient today and spent greater than 30 minutes discharging this  patient.    Omero Shukla MD  [place] [address]  [address] [facility] [address]  Phone: [phone]  Fax: [phone]  ______________________________________________________________________    Physical Exam   Vital Signs: Temp: 98.5  F (36.9  C) Temp src: Oral BP: 130/81 Pulse: 73   Resp: 18 SpO2: 94 % O2 Device: None (Room air)    Weight: 255 lbs 8.21 oz  General: Patient is lying in bed comfortably she appears in no distress  HEENT: Head is normocephalic atraumatic eyes pupils appear equal round and reactive to light  Heart: She has a good S1 and S2 no obvious murmurs peripheral pulses are palpable.  Lungs: She has a normal respiratory effort on auscultation good air entry is noted  Abdomen: Soft nontender nondistended bowel sounds are noted  Musculoskeletal: Bilateral lower extremity lymphedema is noted erythema and some swelling noted to her right lower extremity.  Tender to touch  Skin: Please refer to nursing/wound care note for complete skin exam.       Primary Care Physician   Physician No Ref-Primary    Discharge Orders      Reason for your hospital stay    Please see discharge summary     Follow-up and recommended labs and tests     Follow up with primary care provider, Physician No Ref-Primary, within 7 days for hospital follow- up.  The following labs/tests are recommended: CBC and CMP.     Activity    Your activity upon discharge: activity as tolerated     Diet    Follow this diet upon discharge: Orders Placed This Encounter      Combination Diet Regular Diet Adult       Significant Results and Procedures   Most Recent 3 CBC's:  Recent Labs   Lab Test 07/23/24  0628 07/22/24  1106 07/21/24  0755   WBC 11.4* 9.1 8.5   HGB 9.7* 9.3* 9.2*   MCV 86 85 86    266 216     Most Recent 3 BMP's:  Recent Labs   Lab Test 07/23/24  0749 07/23/24  0628 07/22/24  2228 07/22/24  0812 07/22/24  0533 07/21/24  0831 07/21/24  0755   NA  --  142  --   --  139  --  139   POTASSIUM   --  3.6  --   --  3.7  --  3.7   CHLORIDE  --  100  --   --  101  --  101   CO2  --  30*  --   --  30*  --  27   BUN  --  13.0  --   --  11.3  --  11.2   CR  --  0.85  --   --  0.81  --  0.83   ANIONGAP  --  12  --   --  8  --  11   JUNI  --  9.0  --   --  8.4*  --  8.5*   * 99 119*   < > 105*   < > 99    < > = values in this interval not displayed.     Most Recent 2 LFT's:  Recent Labs   Lab Test 07/23/24  0628 07/22/24  0533   AST 53* 63*   ALT 85* 84*   ALKPHOS 541* 480*   BILITOTAL 0.2 0.2     Most Recent 3 INR's:No lab results found.    Discharge Medications   Current Discharge Medication List        START taking these medications    Details   doxycycline hyclate (VIBRA-TABS) 100 MG tablet Take 1 tablet (100 mg) by mouth 2 times daily for 5 days  Qty: 10 tablet, Refills: 0    Associated Diagnoses: Ulcer of right lower extremity with fat layer exposed (H)           CONTINUE these medications which have CHANGED    Details   acetaminophen (TYLENOL) 325 MG tablet Take 3 tablets (975 mg) by mouth every 6 hours as needed for mild pain or fever  Qty: 35 tablet, Refills: 0    Associated Diagnoses: Pain associated with wound           CONTINUE these medications which have NOT CHANGED    Details   ascorbic acid (VITAMIN C) 250 MG CHEW chewable tablet Take 250 mg by mouth 2 times daily      aspirin (ASPIRIN 81) 81 MG chewable tablet Take 1 tablet by mouth every morning      Bioflavonoid Products (CINDY-C) 500-550 MG TABS Take 1 tablet by mouth 2 times daily  Qty: 60 tablet, Refills: 3    Associated Diagnoses: Ulcer of right lower extremity with fat layer exposed (H)      buPROPion (WELLBUTRIN SR) 150 MG 12 hr tablet Take 150 mg by mouth every morning       Carboxymethylcellulose Sodium 0.25 % SOLN Apply 1 drop to eye daily as needed      citalopram (CELEXA) 40 MG tablet Take 40 mg by mouth daily (with lunch)      ferrous sulfate (FEROSUL) 325 (65 Fe) MG tablet Take 325 mg by mouth three times a week      furosemide  (LASIX) 20 MG tablet Take 20 mg by mouth 2 times daily      gabapentin (NEURONTIN) 100 MG capsule Take 100 mg by mouth 3 times daily      magnesium oxide (MAG-OX) 400 MG tablet Take 1 tablet by mouth every morning      multivitamin w/minerals (CENTRUM ADULTS) tablet Take 1 tablet by mouth every morning      pantoprazole (PROTONIX) 40 MG EC tablet Take 40 mg by mouth every morning      !! rOPINIRole (REQUIP) 1 MG tablet Take 0.5 mg by mouth every morning In addition, patient takes one tablet (1 mg) in the evening      !! rOPINIRole (REQUIP) 1 MG tablet Take 1 mg by mouth every evening In addition, patient takes one half tablet (0.5 mg) in the morning      simvastatin (ZOCOR) 40 MG tablet [SIMVASTATIN (ZOCOR) 40 MG TABLET] Take 40 mg by mouth bedtime.      spironolactone (ALDACTONE) 25 MG tablet Take 25 mg by mouth every morning      vitamin B-Complex Take 1 tablet by mouth daily  Qty: 60 tablet, Refills: 3    Associated Diagnoses: Ulcer of right lower extremity with fat layer exposed (H)      vitamin D3 (CHOLECALCIFEROL) 250 mcg (47861 units) capsule Take 1 capsule (250 mcg) by mouth daily  Qty: 60 capsule, Refills: 3    Associated Diagnoses: Ulcer of right lower extremity with fat layer exposed (H)      Wound Cleansers (VASHE WOUND THERAPY EX) Externally apply topically daily as needed      zinc 50 MG TABS Take 1 tablet by mouth every morning      acetic acid 0.25 % irrigation Irrigate with as directed daily for 30 days  Qty: 1000 mL, Refills: 3    Associated Diagnoses: Ulcer of right lower extremity with fat layer exposed (H)      benzonatate (TESSALON) 100 MG capsule Take 1 capsule (100 mg) by mouth 3 times daily as needed for cough    Associated Diagnoses: Cellulitis of right lower extremity      nitroGLYcerin (NITROSTAT) 0.4 MG sublingual tablet PLACE 1 TABLET UNDER TONGUE EVERY 5 MINTUES AS NEEDED FOR CHEST PAIN FOR UP TO 30 DAYS       !! - Potential duplicate medications found. Please discuss with provider.         STOP taking these medications       potassium chloride ER (KLOR-CON M) 20 MEQ CR tablet Comments:   Reason for Stopping:         sulfamethoxazole-trimethoprim (BACTRIM DS) 800-160 MG tablet Comments:   Reason for Stopping:             Allergies   Allergies   Allergen Reactions    Other Environmental Allergy Anaphylaxis     Bees/Wasps Stings  Bees/Wasps Stings    Adhesive Tape Other (See Comments)     Red,itchy and oozes  Red,itchy and oozes    Adhesive [Cyanoacrylate] Unknown     Other reaction(s): sores    Latex Hives    Oxycodone-Acetaminophen Rash    Vicodin [Hydrocodone-Acetaminophen] Nausea and Vomiting and Hives

## 2024-07-23 NOTE — PROGRESS NOTES
CLINICAL NUTRITION SERVICES    Reviewed nutrition risk factors due to LOS. Pt is tolerating diet, eating well per nursing documentation. Pt is eating % of meals and ordering adequately (at least 75% of estimated needs) for increased nutrition needs for wound healing. Wound is evolving and improving per WOC 7/19. No nutrition issues identified at this time. RD will follow peripherally at this time, unless consulted.

## 2024-07-23 NOTE — PLAN OF CARE
Problem: Adult Inpatient Plan of Care  Goal: Optimal Comfort and Wellbeing  Intervention: Monitor Pain and Promote Comfort  Recent Flowsheet Documentation  Taken 7/22/2024 2000 by Kareem Samaniego RN  Pain Management Interventions: medication (see MAR)     Problem: Adult Inpatient Plan of Care  Goal: Absence of Hospital-Acquired Illness or Injury  Intervention: Prevent Infection  Recent Flowsheet Documentation  Taken 7/22/2024 2233 by Kareem Samaniego, RN  Infection Prevention: hand hygiene promoted   Goal Outcome Evaluation:  Pt A/O, pleasant, os sats WNL on RA. Pt had PRN tylenol and dilaudid for R leg pain. Wound dressing to R leg intact. Pt walks with her cane with stand-by assist of one. Pt was continent of bowel overnight. Purewick in place . Pt continues to take Vanco for MARSA, vanco trough  was done this morning, no change in baseline overnight.

## 2024-07-23 NOTE — PROGRESS NOTES
Care Management Discharge Note    Discharge Date: 07/23/2024       Discharge Disposition: Home    Discharge Services:  None      Additional Information:  Pt discharging home.     MAT Hughes

## 2024-07-23 NOTE — PHARMACY-VANCOMYCIN DOSING SERVICE
Pharmacy Vancomycin Note  Date of Service 2024  Patient's  1945   78 year old, female    Indication: Skin and Soft Tissue Infection  Day of Therapy: 8  Current vancomycin regimen:  1500 mg IV q18h  Current vancomycin monitoring method: AUC  Current vancomycin therapeutic monitoring goal: 400-600 mg*h/L    Insight[employer] Prediction of Current Vancomycin Regimen  Regimen: 1500 mg IV every 18 hours.  Start time: 15:13 on 2024  Exposure target: AUC24 (range)400-600 mg/L.hr   AUC24,ss: 568 mg/L.hr  Probability of AUC24 > 400: 94 %  Ctrough,ss: 11.6 mg/L  Probability of Ctrough,ss > 20: 0 %  Probability of nephrotoxicity (Lodise LOUIS ): 7 %    Current estimated CrCl = Estimated Creatinine Clearance: 65.5 mL/min (based on SCr of 0.85 mg/dL).    Creatinine for last 3 days  2024:  9:57 AM Creatinine 0.78 mg/dL  2024:  7:55 AM Creatinine 0.83 mg/dL  2024:  5:33 AM Creatinine 0.81 mg/dL  2024:  6:28 AM Creatinine 0.85 mg/dL    Recent Vancomycin Levels (past 3 days)  2024:  9:57 AM Vancomycin 8.9 ug/mL  2024:  6:28 AM Vancomycin 19.1 ug/mL    Vancomycin IV Administrations (past 72 hours)                     vancomycin (VANCOCIN) 1,500 mg in sodium chloride 0.9 % 250 mL intermittent infusion (mg) 1,500 mg New Bag 24 1910     1,500 mg New Bag  0058     1,500 mg New Bag 24 0754     1,500 mg New Bag 24 1208                    Nephrotoxins and other renal medications (From now, onward)      Start     Dose/Rate Route Frequency Ordered Stop    24 1200  vancomycin (VANCOCIN) 1,500 mg in sodium chloride 0.9 % 250 mL intermittent infusion         1,500 mg  over 90 Minutes Intravenous EVERY 18 HOURS 24 1052      24 2000  furosemide (LASIX) tablet 20 mg        Note to Pharmacy: PTA Sig:Take 20 mg by mouth 2 times daily      20 mg Oral 2 TIMES DAILY 24 1721                 Contrast Orders - past 72 hours (72h ago, onward)      None             Interpretation of levels and current regimen:  Vancomycin level is reflective of -600    Has serum creatinine changed greater than 50% in last 72 hours: No    Renal Function: Stable    InsightRX Prediction of Planned New Vancomycin Regimen  Regimen: 1500 mg IV every 18 hours.  Start time: 13:10 on 07/23/2024  Exposure target: AUC24 (range)400-600 mg/L.hr   AUC24,ss: 556 mg/L.hr  Probability of AUC24 > 400: 100 %  Ctrough,ss: 13.5 mg/L  Probability of Ctrough,ss > 20: 0 %  Probability of nephrotoxicity (Lodise LOUIS 2009): 9 %    Plan:  Continue Current Dose  Vancomycin monitoring method: AUC  Vancomycin therapeutic monitoring goal: 400-600 mg*h/L  Pharmacy will check vancomycin levels as appropriate in 5-7 Days.  Serum creatinine levels will be ordered daily for the first week of therapy and at least twice weekly for subsequent weeks.    Nicollette McMann, formerly [facility]

## 2024-07-24 NOTE — PATIENT INSTRUCTIONS
"Wound Care Instructions     TWICE PER WEEK IN THE CLINIC and as needed, Cleanse your right leg and left leg wound(s) with 5 minute vashe soak.     Pat Dry with non-sterile gauze     Primary Dressing: Apply hydrofera blue ready transfer into/onto the wounds     Secondary dressing: Cover with ABDs     Secure with non-sterile roll gauze (4\" x 75\" roll) and tape (1\" roll tape) as needed; avoid adhesive directly on the skin     Compression: 4 layer bilaterally     SEEK MEDICAL CARE IF:  You have an increase in swelling, pain, or redness around the wound.  You have an increase in the amount of pus coming from the wound.  There is a bad smell coming from the wound.  The wound appears to be worsening/enlarging  You have a fever greater than 101.5 F     It is recommended that you elevate your legs throughout the day: approx 2-3 times each day  Elevate them above the level of your heart for 30 min.   Ways to do this:   Lay on the couch or your bed and prop your legs up on pillows   Recline back as far as you can go in your recliner and prop your legs on pillows.      Doing these things will help reduce the edema in your legs.     Start antibiotic, watch tempeture and pain and drainage every day, Use compression pumps daily. Going to emergency if needed.        We want to hear from you!  In the next few weeks, you should receive a call or email to complete a survey about your visit at [facility]. Please help us improve your appointment experience by letting us know how we did today. We strive to make your experience good and value any ways in which we could do better.       We value your input and suggestions.     Thank you for choosing the [facility]!  "

## 2024-07-24 NOTE — PROGRESS NOTES
[facility]  -  Nurse Visit        RLE      R lateral leg      R medial leg      LLE      L valente      L Lateral leg    Date of Service:  July 25, 2024     Requesting Provider: MD Lars Torres    Diagnosis:     ICD-10-CM    1. Ulcer of right lower extremity with fat layer exposed (H)  L97.912 Wound care     Home Care Referral      2. Acquired lymphedema of leg  I89.0 Wound care     Home Care Referral      3. Venous hypertension of lower extremity, bilateral  I87.303 Wound care     Home Care Referral      4. MS (multiple sclerosis) (H)  G35 Home Care Referral      5. Peripheral venous insufficiency  I87.2 Home Care Referral      6. Gait abnormality  R26.9 Home Care Referral          Chief Complaint: Elizabeth is being seen today at [facility] [place] for her wound(s) and swelling dressing change. Reports pain of 3 out of 10.  Denies any fevers, chills, or generalized ill feeling.     Dressing on Arrival: Oil emulsion to wounds and ABD pads. Pt is currently using no compression on right leg and edema wear on left leg for compression and did tolerate well. Upon removal of dressings copious serosanguinous and green drainage is noted from left leg.    Pt reports she was discharged from the hospital recently and is still having some weakness. She reports she is going to take time away from work to help her heal. She would like to do lymphedema therapy and is open to having home care ordered to continue wound cares. She reports she is going to loose her primary coverage from work on 7/31/24 and will have just Medicare. Spoke with Dr. Torres and okay to order home care and will see if able to start after 7/31/24 due to coverage. Scheduled follow-up with Dr. Torres on 8/8/24.     New Wounds noted: No    Vital Signs: BP (!) 160/83   Pulse 74   Temp 98  F (36.7  C) (Oral)   Resp 12   SpO2 100%     Assessment:      General:  Patient presents to clinic in no apparent distress.    Psychiatric:  Alert and oriented x3.   Lower extremity:  edema is present.    Integumentary:  Skin is weeping and some dry scaly areas    Circumferential volume measures:      6/18/2024     1:00 PM 7/5/2024     9:00 AM 7/11/2024    11:00 AM 7/15/2024    11:00 AM 7/25/2024    11:00 AM   Circumferential Measures   Right just above MTP 25 26 23.5 26 24   Right Ankle 25 26 22.5 26 25   Right Widest Calf 57 63 58 58.5 58   Left - just above MTP 24 23 26 22 23   Left Ankle 25 24 23.2 24 23.5   Left Widest Calf 56 61 58 46 52.3     Wound info:  Wound Leg Other (comment);Ulceration (Active)       Wound Leg Ulceration (Active)   Wound Bed Other (Comment) 07/23/24 0800   Dressing Other (Comment) 07/22/24 2000   Dressing Status Changed;Clean, dry, intact 07/23/24 1000       Wound (used by [facility] only) 03/10/22 0900 Right lower;anterior leg ulceration, venous (Active)       Wound (used by [facility] only) 01/23/23 1550 Right leg (Active)       Wound (used by [facility] only) 06/01/23 1101 Right medial;lower leg (Active)       Wound (used by [facility] only) 07/22/24 1700 Right medial;lateral unspecified (Active)       Wound (used by [facility] only) calf Right (Active)       VASC Wound right lower leg (Active)   Pre Size Length 0.5 07/01/24 0900   Pre Size Width 1 07/01/24 0900   Pre Size Depth 0.1 07/01/24 0900   Pre Total Sq cm 0.5 07/01/24 0900   Description closed 07/08/24 1100   Product Used ABD Pad 07/01/24 0900       VASC Wound Rt lateral leg (Active)   Pre Size Length 17 07/25/24 1100   Pre Size Width 35 07/25/24 1100   Pre Size Depth 0.1 07/25/24 1100   Pre Total Sq cm 595 07/25/24 1100   Post Size Length 1.8 02/08/24 1136   Post Size Width 1.5 02/08/24 1136   Post Size Depth 0.1 02/08/24 1136   Post Total Sq cm 2.7 02/08/24 1136   Description superfical weep/red 07/15/24 1100   Product Used ABD Pad 07/15/24 1100       VASC Wound Rt medial leg (Active)   Pre Size Length 3.5 07/25/24 1100   Pre Size Width 2.5 07/25/24 1100   Pre Size  Depth 0.1 24 1100   Pre Total Sq cm 8.75 24 1100   Post Size Length 3.2 24 1136   Post Size Width 1.7 24 1136   Post Size Depth 0.1 24 1136   Post Total Sq cm 5.44 24 1136   Description weeping 07/15/24 1100   Product Used ABD Pad 07/15/24 1100       VASC Wound Left shin (Active)   Pre Size Length 0.5 24 1100   Pre Size Width 0.5 24 1100   Pre Size Depth 0.1 24 1100   Pre Total Sq cm 0.25 24 1100   Product Used ABD Pad 07/15/24 1100       VASC Wound left posterior leg (Active)   Pre Size Length 2 24 0900   Pre Size Width 2 24 0900   Pre Size Depth 0.1 24 0900   Pre Total Sq cm 4 24 0900   Description superfical weeping 07/15/24 1100   Product Used ABD Pad 07/15/24 1100       Incision/Surgical Site 22 Right;Lower Leg (Active)       Undermining is not present.    The periwoundskin is  pink and red      Plan:         1. Patient will return on 24 for nurse visit           2. As listed below, treatment provided irrigation, mechanical cleansing, and dressings to promote autolytic debridement and included application of multi-layer compression therapy.             Cleansed with: Vashe soak 5 minutes    Protected skin with:  Triad    Dressings Applied to wound: Hydrofera blue ready transfer and ABD    Compression Applied to the right le-Layer Compression  Compression Applied to the left le-Layer Compression    4-Layer: LAYER 1: ORTHOPAEDIC WOOL The bandage was applied without tension in aloose spiral from base of toes to knee joint with 50% overlap.LAYER 2: LIGHT SUPPORT BANDAGE Then the next layer bandage was applied in spiral toe to knee with 50% overlap with 50% overlap.LAYER 3: LIGHT COMPRESSION BANDAGE Then the elastic conformable. compression bandage was applied at mid stretch (50%) in a figure of eight from toe to knee, with a 50% overlap.  LAYER 4: COHESIVE EXTENSIBLE BANDAGE Finally the lightweight, elastic,  cohesive bandage was applied at mid stretch (50%) in a spiral with a 50 % overlap from toe to knee.    Offloading used:  cane    Trial Products: No    Provider notified regarding concerns: Yes: orders for home care    Treatment Changes: No    Tolerated Dressing Change:  Yes    Taught Regarding: follow up appointment(s), wound cares, layered compression wraps - maximum wear time of 7 days, elevation, offloading, compliance, signs of infection, and home care    Educational Barriers: No barriers      AUGUSTINA BECK RN

## 2024-07-25 ENCOUNTER — OFFICE VISIT (OUTPATIENT)
Dept: VASCULAR SURGERY | Facility: CLINIC | Age: 79
End: 2024-07-25
Attending: FAMILY MEDICINE
Payer: COMMERCIAL

## 2024-07-25 ENCOUNTER — TELEPHONE (OUTPATIENT)
Dept: VASCULAR SURGERY | Facility: CLINIC | Age: 79
End: 2024-07-25

## 2024-07-25 ENCOUNTER — PATIENT OUTREACH (OUTPATIENT)
Dept: CARE COORDINATION | Facility: CLINIC | Age: 79
End: 2024-07-25
Payer: COMMERCIAL

## 2024-07-25 VITALS
RESPIRATION RATE: 12 BRPM | OXYGEN SATURATION: 100 % | SYSTOLIC BLOOD PRESSURE: 160 MMHG | HEART RATE: 74 BPM | DIASTOLIC BLOOD PRESSURE: 83 MMHG | TEMPERATURE: 98 F

## 2024-07-25 DIAGNOSIS — I87.2 PERIPHERAL VENOUS INSUFFICIENCY: ICD-10-CM

## 2024-07-25 DIAGNOSIS — I89.0 ACQUIRED LYMPHEDEMA OF LEG: ICD-10-CM

## 2024-07-25 DIAGNOSIS — I87.303 VENOUS HYPERTENSION OF LOWER EXTREMITY, BILATERAL: ICD-10-CM

## 2024-07-25 DIAGNOSIS — R26.9 GAIT ABNORMALITY: ICD-10-CM

## 2024-07-25 DIAGNOSIS — L97.912 ULCER OF RIGHT LOWER EXTREMITY WITH FAT LAYER EXPOSED (H): Primary | ICD-10-CM

## 2024-07-25 DIAGNOSIS — G35 MS (MULTIPLE SCLEROSIS) (H): ICD-10-CM

## 2024-07-25 PROCEDURE — 97602 WOUND(S) CARE NON-SELECTIVE: CPT

## 2024-07-25 ASSESSMENT — PAIN SCALES - GENERAL: PAINLEVEL: MILD PAIN (3)

## 2024-07-25 NOTE — TELEPHONE ENCOUNTER
Primary coverage from [facility] on 7/31/24 will end per pt and will have just Medicare BCBS. Referral for wound cares / lymphedema therapy to start after 7/31/24. Has NV scheduled for 2 x weekly until HC found.     [facility]- declined at capacity 7/25    [facility] - 7/29/24 ABLE TO ACCEPT- SOC THURSDAY 8/1/24. PT NEEDS TO BE NOTIFIED AND NV APPTS NEED TO BE CANCELLED    [facility] - refaxed 7/29; they will call back prior to 1200 KM    [place]

## 2024-07-25 NOTE — PROGRESS NOTES
[facility]: Transitions of Care Outreach  Chief Complaint   Patient presents with    Clinic Care Coordination - Post Hospital       Most Recent Admission Date: 7/16/2024   Most Recent Admission Diagnosis: Cellulitis of right leg - L03.115  Sepsis, due to unspecified organism, unspecified whether acute organ dysfunction present (H) - A41.9     Most Recent Discharge Date: 7/23/2024   Most Recent Discharge Diagnosis: Sepsis, due to unspecified organism, unspecified whether acute organ dysfunction present (H) - A41.9  Cellulitis of right leg - L03.115  Peripheral venous insufficiency - I87.2  Ulcer of right lower extremity with fat layer exposed (H) - L97.912  Pain associated with wound - T14.8XXA, R52  Ulcer of right lower extremity with fat layer exposed (H) - L97.912  Constipation, unspecified constipation type - K59.00     Transitions of Care Assessment    Discharge Assessment  How are you doing now that you are home?: Patient reports that she is doing well, she realizes her progress will be slow, but steady. She has an appointment with her wound doctor today. No questions or concerns.  How are your symptoms? (Red Flag symptoms escalate to triage hotline per guidelines): Improved  Do you know how to contact your clinic care team if you have future questions or changes to your health status? : Yes  Does the patient have their discharge instructions? : Yes  Does the patient have questions regarding their discharge instructions? : No  Were you started on any new medications or were there changes to any of your previous medications? : Yes  Does the patient have all of their medications?: Yes  Do you have questions regarding any of your medications? : No  Do you have all of your needed medical supplies or equipment (DME)?  (i.e. oxygen tank, CPAP, cane, etc.): Yes              Follow up Plan   Follow-up Appointments     Follow-up and recommended labs and tests       Follow up with primary care  provider, Physician No Ref-Primary, within 7   days for hospital follow- up.  The following labs/tests are recommended:   CBC and CMP.       Discharge Follow-Up  Discharge follow up appointment scheduled in alignment with recommended follow up timeframe or Transitions of Risk Category? (Low = within 30 days; Moderate= within 14 days; High= within 7 days): Yes  Discharge Follow Up Appointment Date: 07/25/24    Future Appointments   Date Time Provider Department Center   [facility]       Outpatient Plan as outlined on AVS reviewed with patient.    For any urgent concerns, please contact our 24 hour nurse triage line: [phone] ([phone])       JACKY Wagner (she/her/hers)  Social Work Clinic Care Coordinator   [facility]  [email or web address]  [phone]

## 2024-07-29 ENCOUNTER — OFFICE VISIT (OUTPATIENT)
Dept: VASCULAR SURGERY | Facility: CLINIC | Age: 79
End: 2024-07-29
Attending: FAMILY MEDICINE
Payer: COMMERCIAL

## 2024-07-29 VITALS — HEART RATE: 72 BPM | TEMPERATURE: 98.5 F | OXYGEN SATURATION: 98 % | RESPIRATION RATE: 16 BRPM

## 2024-07-29 DIAGNOSIS — I89.0 ACQUIRED LYMPHEDEMA OF LEG: Primary | ICD-10-CM

## 2024-07-29 DIAGNOSIS — L97.912 ULCER OF RIGHT LOWER EXTREMITY WITH FAT LAYER EXPOSED (H): ICD-10-CM

## 2024-07-29 PROCEDURE — 97602 WOUND(S) CARE NON-SELECTIVE: CPT

## 2024-07-29 ASSESSMENT — PAIN SCALES - GENERAL: PAINLEVEL: MODERATE PAIN (4)

## 2024-07-29 NOTE — TELEPHONE ENCOUNTER
HC referrals faxed to:  [facility]- able to accept referral, they will begin seeing pt on Friday 8/2   [facility] - refaxed  [facility] - refaxed    Declined:  [facility] - at capacity  [facility] - not accepting BCBS

## 2024-07-29 NOTE — PROGRESS NOTES
"[facility]  -  Nurse Visit                                  Date of Service:  July 29, 2024     Requesting Provider: MD Lars Torres    Diagnosis:     ICD-10-CM    1. Acquired lymphedema of leg  I89.0       2. Ulcer of right lower extremity with fat layer exposed (H)  L97.912           Chief Complaint: Elizabeth is being seen today at [facility] [place] for her wound(s) dressing change. Reports pain of 4.  Denies any fevers, chills, or generalized ill feeling.     Dressing on Arrival: Hydrofera blue ready transfer, ABD, rolled gauze, Triad, Pt is currently using 4 layer for compression and did tolerate well except on right foot as states it was \"tighter than it had ever been applied\" so she had to cut that part off. Upon removal of dressings heavy Serosanguinous drainage is noted - right leg.    New Wounds noted: No    Vital Signs: Pulse 72   Temp 98.5  F (36.9  C)   Resp 16   SpO2 98%     Assessment:      General:  Patient presents to clinic in no apparent distress.   Psychiatric:  Alert and oriented x3.   Lower extremity:  edema is present.    Integumentary:  Skin is flaky, scaly.    Circumferential volume measures:      7/5/2024     9:00 AM 7/11/2024    11:00 AM 7/15/2024    11:00 AM 7/25/2024    11:00 AM 7/29/2024     1:00 PM   Circumferential Measures   Right just above MTP 26 23.5 26 24 22.7   Right Ankle 26 22.5 26 25 22.5   Right Widest Calf 63 58 58.5 58 55.5   Left - just above MTP 23 26 22 23 22.1   Left Ankle 24 23.2 24 23.5 23.3   Left Widest Calf 61 58 46 52.3 45.7       Wound info:  Wound Leg Other (comment);Ulceration (Active)       Wound Leg Ulceration (Active)   Wound Bed Other (Comment) 07/23/24 0800   Dressing Other (Comment) 07/22/24 2000   Dressing Status Changed;Clean, dry, intact 07/23/24 1000       Wound (used by [facility] only) 03/10/22 0900 Right lower;anterior leg ulceration, venous (Active)       Wound (used by [facility] only) 01/23/23 1550 Right leg " (Active)       Wound (used by OP [facility] only) 06/01/23 1101 Right medial;lower leg (Active)       Wound (used by OP [facility] only) 07/22/24 1700 Right medial;lateral unspecified (Active)       Wound (used by OP [facility] only) calf Right (Active)       VASC Wound right lower leg (Active)   Pre Size Length 0.5 07/01/24 0900   Pre Size Width 1 07/01/24 0900   Pre Size Depth 0.1 07/01/24 0900   Pre Total Sq cm 0.5 07/01/24 0900   Description closed 07/08/24 1100   Product Used ABD Pad 07/01/24 0900       VASC Wound Rt lateral leg (Active)   Pre Size Length 17 07/29/24 1300   Pre Size Width 33 07/29/24 1300   Pre Size Depth 0.1 07/29/24 1300   Pre Total Sq cm 561 07/29/24 1300   Post Size Length 1.8 02/08/24 1136   Post Size Width 1.5 02/08/24 1136   Post Size Depth 0.1 02/08/24 1136   Post Total Sq cm 2.7 02/08/24 1136   Description superfical weep/red 07/15/24 1100   Product Used ABD Pad 07/15/24 1100       VASC Wound Rt medial leg (Active)   Pre Size Length 3.9 07/29/24 1300   Pre Size Width 1.9 07/29/24 1300   Pre Size Depth 0.1 07/29/24 1300   Pre Total Sq cm 7.41 07/29/24 1300   Post Size Length 3.2 02/08/24 1136   Post Size Width 1.7 02/08/24 1136   Post Size Depth 0.1 02/08/24 1136   Post Total Sq cm 5.44 02/08/24 1136   Description weeping 07/15/24 1100   Product Used ABD Pad 07/15/24 1100       VASC Wound Left shin (Active)   Pre Size Length 0.2 07/29/24 1300   Pre Size Width 0.3 07/29/24 1300   Pre Size Depth 0.1 07/29/24 1300   Pre Total Sq cm 0.06 07/29/24 1300   Product Used ABD Pad 07/15/24 1100       VASC Wound left posterior leg (Active)   Pre Size Length 2 07/01/24 0900   Pre Size Width 2 07/01/24 0900   Pre Size Depth 0.1 07/01/24 0900   Pre Total Sq cm 4 07/01/24 0900   Description superfical weeping 07/15/24 1100   Product Used ABD Pad 07/15/24 1100       Incision/Surgical Site 03/21/22 Right;Lower Leg (Active)       Undermining is not present.    The periwound skin is  flaky/scaly      Plan:         1. Patient  will return in 3 days for nurse visit.            2. As listed below, treatment provided irrigation, mechanical cleansing, and dressings to promote autolytic debridement and included application of multi-layer compression therapy.             Cleansed with: soap and water and Vashe soak for 5+ minutes    Protected skin with: Remedy skin repair lotion and Triad    Dressings Applied to wound: Hydrofera blue ready transfer, ABD, and Secured with roll gauze and tape.     Compression Applied to the right le-Layer Compression  Compression Applied to the left le-Layer Compression      4-Layer: LAYER 1: ORTHOPAEDIC WOOL The bandage was applied without tension in a loose spiral from base of toes to knee joint with 50% overlap.LAYER 2: LIGHT SUPPORT BANDAGE Then the next layer bandage was applied in spiral toe to knee with 50% overlap with 50% overlap.LAYER 3: LIGHT COMPRESSION BANDAGE Then the elastic compression bandage was applied at mid stretch (50%) in a figure of eight from toe to knee, with a 50% overlap.  LAYER 4: COHESIVE EXTENSIBLE BANDAGE Finally the lightweight, elastic, cohesive bandage was applied at mid stretch (50%) in a spiral with a 50 % overlap from toe to knee.    Offloading used:  cane    Trial Products: No    Provider notified regarding concerns: No    Treatment Changes: No    Tolerated Dressing Change:  Yes    Taught Regarding: follow up appointment(s), wound cares, elevation, compliance, and protein    Educational Barriers: No barriers      Kimmy Valle RN, CWOCN

## 2024-07-29 NOTE — PATIENT INSTRUCTIONS
"Wound Care Instructions     TWICE PER WEEK IN THE CLINIC and as needed, Cleanse your right leg and left leg wound(s) with 5 minute vashe soak.     Pat Dry with non-sterile gauze     Primary Dressing: Apply hydrofera blue ready transfer into/onto the wounds     Secondary dressing: Cover with ABDs     Secure with non-sterile roll gauze (4\" x 75\" roll) and tape (1\" roll tape) as needed; avoid adhesive directly on the skin     Compression: 4 layer bilaterally     SEEK MEDICAL CARE IF:  You have an increase in swelling, pain, or redness around the wound.  You have an increase in the amount of pus coming from the wound.  There is a bad smell coming from the wound.  The wound appears to be worsening/enlarging  You have a fever greater than 101.5 F     It is recommended that you elevate your legs throughout the day: approx 2-3 times each day  Elevate them above the level of your heart for 30 min.   Ways to do this:   Lay on the couch or your bed and prop your legs up on pillows   Recline back as far as you can go in your recliner and prop your legs on pillows.      Doing these things will help reduce the edema in your legs.     Start antibiotic, watch tempeture and pain and drainage every day, Use compression pumps daily. Going to emergency if needed.        We want to hear from you!  In the next few weeks, you should receive a call or email to complete a survey about your visit at [facility]. Please help us improve your appointment experience by letting us know how we did today. We strive to make your experience good and value any ways in which we could do better.       We value your input and suggestions.     Thank you for choosing the [facility]!  "

## 2024-07-29 NOTE — TELEPHONE ENCOUNTER
[facility] able to accept but only for wound care. OT is at capacity for lymphedema. If lymphedema is still needed after wounds healed, [facility] stated they can refer patient to outpatient if needed. Pt will need to be notified that NV need to be cancelled and that home care has been found.

## 2024-07-30 NOTE — TELEPHONE ENCOUNTER
Per notes from yesterday, patient was accepted by 2 home care agencies. Called both [facility] and [facility]. [facility] did not have a start date, [facility] can start on 8/2. Called and updated [facility] that another agency has accepted. Called and updated [place] that [facility] will start 8/2, so nurse visits cancelled for Thursday and Monday. Pt verbailzed understanding.

## 2024-07-31 NOTE — PATIENT INSTRUCTIONS
Patient Education   Doxycycline Oral Capsule (DOXYCYCLINE - ORAL)  For treating bacterial infection.  Brand Name(s): Adoxa, Mondoxyne, Monodox, Morgidox, Vibramycin  Generic Name: Doxycycline Monohydrate, Doxycycline Hyclate  Instructions  Take the medicine with 250 mL (1 cup) of water.  Take on empty stomach - 1 hour before or 2 hours after eating.  Sit or stand upright for 30 minutes after taking the medicine. Do not lie down.  Keep the medicine at room temperature. Avoid heat and direct light.  Do not take any antacid or vitamins with magnesium, calcium, aluminum, or iron for 2 hours before and 2 hours after taking this medicine.  This medicine can make you sensitive to the sun. Use sunscreen or protective clothing when in sun.  If you forget to take a dose on time, take it as soon as you remember. If it is almost time for the next dose, do not take the missed dose. Return to your normal schedule. Do not take 2 doses at one time.  Drug interactions can change how medicines work or increase risk for side effects. Tell your health care providers about all medicines taken. Include prescription and over-the-counter medicines, vitamins, and herbal medicines. Speak with your doctor or pharmacist before starting or stopping any medicine.  Keep using this medicine for the full number of days that it is prescribed. Do not stop the medicine even if you start to feel better.  This medicine can cause permanent change in teeth color in children.  Cautions  Tell your doctor and pharmacist if you ever had an allergic reaction to a medicine.  Do not use the medication any more than instructed.  Contact your doctor if you notice a change in the amount or darkening of your urine.  Please tell your doctor if you have moderate to severe diarrhea while on this medicine. Do not treat the diarrhea with over-the-counter diarrhea medicine.  This medicine passes into breast milk. Ask your doctor before breastfeeding.  This medicine can  hurt a new baby in the womb. If you become pregnant while on this medicine, tell your doctor immediately. Your doctor may switch you to a different medicine.  Do not share this medicine with anyone who has not been prescribed this medicine.  Side Effects  The following is a list of some common side effects from this medicine. Please speak with your doctor about what you should do if you experience these or other side effects.  diarrhea  nausea and vomiting  stomach upset or abdominal pain  yeast infection of mouth  vaginal itching or yeast infection  Call your doctor or get medical help right away if you notice any of these more serious side effects:  difficulty swallowing  swelling in the neck or throat  blurring or changes of vision  A few people may have an allergic reaction to this medicine. Symptoms can include difficulty breathing, skin rash, itching, swelling, or severe dizziness. If you notice any of these symptoms, seek medical help quickly.  Please speak with your doctor, nurse, or pharmacist if you have any questions about this medicine.  IMPORTANT NOTE: This document tells you briefly how to take your medicine, but it does not tell you all there is to know about it. Your doctor or pharmacist may give you other documents about your medicine. Please talk to them if you have any questions. Always follow their advice.  There is a more complete description of this medicine available in English. Scan this code on your smartphone or tablet or use the web address below. You can also ask your pharmacist for a printout. If you have any questions, please ask your pharmacist.  The display and use of this drug information is subject to Terms of Use.  More information about DOXYCYCLINE - ORAL      Copyright(c) 2024 [employer].  Selected from data included with permission and copyright by [employer]. This copyrighted material has been downloaded from a licensed data provider and is not for  "distribution, except as may be authorized by the applicable terms of use.  Conditions of Use: The information in this database is intended to supplement, not substitute for the expertise and judgment of healthcare professionals. The information is not intended to cover all possible uses, directions, precautions, drug interactions or adverse effects nor should it be construed to indicate that use of a particular drug is safe, appropriate or effective for you or anyone else. A healthcare professional should be consulted before taking any drug, changing any diet or commencing or discontinuing any course of treatment. The display and use of this drug information is subject to express Terms of Use.  Care instructions adapted under license by your healthcare professional. If you have questions about a medical condition or this instruction, always ask your healthcare professional. [employer] disclaims any warranty or liability for your use of this information.     Wound Care Instructions    3 Times PER WEEK  and as needed, Cleanse your right leg wound(s) with 5 minute vashe soak.    Pat Dry with non-sterile gauze    Primary Dressing: Apply Apply a zinc oxide barrier cream to the entire leg than cover with hydrofera blue ready transfer into/onto the wounds    Secondary dressing: Cover with ABDs    Secure with non-sterile roll gauze (4\" x 75\" roll) and tape (1\" roll tape) as needed; avoid adhesive directly on the skin    Compression: left leg edema wear and  right leg 4 layer compression until lymphedema therapy is being completed     SEEK MEDICAL CARE IF:  You have an increase in swelling, pain, or redness around the wound.  You have an increase in the amount of pus coming from the wound.  There is a bad smell coming from the wound.  The wound appears to be worsening/enlarging  You have a fever greater than 101.5 F    It is recommended that you elevate your legs throughout the day: approx 2-3 times each " day  Elevate them above the level of your heart for 30 min.   Ways to do this:   Lay on the couch or your bed and prop your legs up on pillows   Recline back as far as you can go in your recliner and prop your legs on pillows.     Doing these things will help reduce the edema in your legs.      We want to hear from you!  In the next few weeks, you should receive a call or email to complete a survey about your visit at [facility]. Please help us improve your appointment experience by letting us know how we did today. We strive to make your experience good and value any ways in which we could do better.      We value your input and suggestions.    Thank you for choosing the [facility]!

## 2024-08-02 ENCOUNTER — TELEPHONE (OUTPATIENT)
Dept: VASCULAR SURGERY | Facility: CLINIC | Age: 79
End: 2024-08-02
Payer: COMMERCIAL

## 2024-08-07 ENCOUNTER — TELEPHONE (OUTPATIENT)
Dept: VASCULAR SURGERY | Facility: CLINIC | Age: 79
End: 2024-08-07
Payer: COMMERCIAL

## 2024-08-07 NOTE — TELEPHONE ENCOUNTER
Pamela, physical therapist with [facility] left a vm with the [facility] to report that she saw the patient for a PT eval 8/6/24. It was only an eval and patient has declined to start PT. Patient also declines the OT eval, per Pamela, and is only interested in continuing with in home nursing care.     If needed, Pamela can be reached at [phone]

## 2024-08-07 NOTE — TELEPHONE ENCOUNTER
Pamela the PT from [facility] called back and stated that Carolina was aware that she was a lymphedema specialist and she declined those services. The nurse who is seeing her (Lillie) is doing the wound care and is trained in lymphedema wrapping so that is what is being completed for the patient now. Carolina has follow up in clinic tomorrow 8/8 with Dr. Torres.

## 2024-08-07 NOTE — TELEPHONE ENCOUNTER
Called Pamlea Schwartz back the PT from [facility]. Left message wanting to verify that Carolina was aware that the therapy is for lymphedema therapy. Requested she call back.

## 2024-08-08 ENCOUNTER — OFFICE VISIT (OUTPATIENT)
Dept: VASCULAR SURGERY | Facility: CLINIC | Age: 79
End: 2024-08-08
Attending: FAMILY MEDICINE
Payer: COMMERCIAL

## 2024-08-08 VITALS — HEART RATE: 69 BPM | OXYGEN SATURATION: 98 % | SYSTOLIC BLOOD PRESSURE: 139 MMHG | DIASTOLIC BLOOD PRESSURE: 79 MMHG

## 2024-08-08 DIAGNOSIS — L97.912 ULCER OF RIGHT LOWER EXTREMITY WITH FAT LAYER EXPOSED (H): ICD-10-CM

## 2024-08-08 DIAGNOSIS — R60.0 LOCALIZED EDEMA: Primary | ICD-10-CM

## 2024-08-08 PROCEDURE — 99215 OFFICE O/P EST HI 40 MIN: CPT | Performed by: FAMILY MEDICINE

## 2024-08-08 PROCEDURE — 99214 OFFICE O/P EST MOD 30 MIN: CPT | Performed by: FAMILY MEDICINE

## 2024-08-08 RX ORDER — DOXYCYCLINE HYCLATE 100 MG
100 TABLET ORAL 2 TIMES DAILY
Qty: 28 TABLET | Refills: 0 | Status: SHIPPED | OUTPATIENT
Start: 2024-08-08 | End: 2024-08-22

## 2024-08-08 ASSESSMENT — PAIN SCALES - GENERAL: PAINLEVEL: SEVERE PAIN (7)

## 2024-08-08 NOTE — PROGRESS NOTES
Pt arrived with emeda wear on the left leg and tubular compression on the right leg. Call placed to [facility] regarding compression being used currently, according to call from 8/7/24 pt being wrapped in lymphedema wraps. Transferred to YUD[facility]on and left generic  since not secure line.     Called back and transferred to the clinical supervisor, Alexandrea and she reviewed notes from last visit on Tuesday and according to the notes a 4 layer was applied but was not sure if this was actually done. She will let Talita know to call back regarding clarification of compression being used currently.     Updated wound care orders faxed to [facility] at [phone].

## 2024-08-08 NOTE — PROGRESS NOTES
Wound Clinic Note         Visit date: 08/08/2024       Cheif Complaint:     Elizabeth Kelley is a 79 year old   female had concerns including Wound Check (Bilateral leg wounds).  The patient has lower extremity edema and bilateral leg ulcers         HISTORY OF PRESENT ILLNESS:    Elizabeth Kelley reports the right leg wound has been present since 2020.  The wound began due to the area being bumped.     The patient denies a history of congestive heart failure, previous vein procedures or previous DVT.   The left leg area started in March 2024 when she scrubbed her leg with a loofah pad.    I last saw this patient in the wound clinic on June 13, 2024.  Initially after that visit she continued to come to the wound clinic for dressing changes with a multilayer compression wrap changed once or twice a week.  She then later required admission to the hospital on July 16th 2024.  She is now back at home and has home health nurses coming out twice a week to change her dressings.  She has just recently had her initial evaluation with a home lymphedema therapist and they will begin ongoing lymphedema therapy with her shortly.  Her left leg has had no drainage recently and they have just been applying an edema wear stocking to the left leg.  The right leg areas have been bandaged with Hydrofera Blue, ABD pads and a Tubigrip stocking.    She reports she was discharged from the hospital with doxycycline and finished taking that a few days after being discharged with no symptoms of an adverse reaction.  However she reports that since stopping the doxycycline she feels that the drainage has gotten more creamy and she just has not been feeling well.        The pateint denies fevers or chills.  They report the pain from the wound has been 0/10 and has remained about the same recently.      The patient reports laying down to elevate their legs above the level of their heart at least twice a day.      She reports recently she has  not been able to use the pneumatic lymphedema pump because it has been too painful.    She confirms she takes Lasix once a day to help control her swelling.    Today the patient reports maintaining a high protein diet, but has not been taking protein supplements lately.        The patient denies a history of diabetes, smoking or chronic steroid use.         The patient has not had any symptoms of infection relating to the wound recently and is not currently on antibiotics.       Problem List:   Past Medical History:   Diagnosis Date    Ankle contracture, left     Class 3 severe obesity due to excess calories without serious comorbidity with body mass index (BMI) of 45.0 to 49.9 in adult (H)     Combined gastric and duodenal ulcer     Controlled substance agreement broken     Depression     Dyslipidemia     Edema     Edema     Gait abnormality     GERD (gastroesophageal reflux disease)     Gout     Lymphedema of both lower extremities     Melanoma (H)     left upper arm    MS (multiple sclerosis) (H)     RLS (restless legs syndrome)     Skin ulcer of left lower leg (H)     Valgus deformity of both feet     Vitamin D deficiency              Family Hx: family history includes Arthritis in her maternal grandmother, maternal uncle, paternal grandfather, paternal grandmother, and sister; Asthma in her sister; Brain Cancer in her father; Breast Cancer in her paternal aunt; Colon Cancer in her paternal aunt; Early Death in her maternal grandfather; Hyperlipidemia in her mother, paternal aunt, and sister; Hypertension in her mother, paternal aunt, and sister; Multiple Sclerosis in her mother and sister; Obesity in her sister; Uterine Cancer in her mother.       Surgical Hx:   Past Surgical History:   Procedure Laterality Date    ABLATION SAPHENOUS VEIN W/ RFA Right 04/12/2021    Saphenous vein, anterior accessory saphenous vein, sclerotherapy of multiple veins.    COSMETIC SURGERY  1988    reduction of excess skin from  weight loss    ESOPHAGOSCOPY, GASTROSCOPY, DUODENOSCOPY (EGD), COMBINED N/A 03/30/2015    Procedure: UPPER ENDOSCOPY with gastric biopsy;  Surgeon: Checo Fletcher MD;  Location: [facility];  Service:     IRRIGATION AND DEBRIDEMENT LOWER EXTREMITY, COMBINED Right 3/21/2022    Procedure: RIGHT LEG WOUND DEBRIDEMENT, PAULIE DERMATONE, MISONIX, VAC VERA FLOW WITH VASHE;  Surgeon: Gabriele Bullock MD;  Location:  OR    IRRIGATION AND DEBRIDEMENT LOWER EXTREMITY, COMBINED Right 3/28/2022    Procedure: DEBRIDEMENT AND WOUND DRESSING CHANGE AND MYRIAD APPLICATION OF RIGHT LOWER LEG WOUND;  Surgeon: Gabriele Bullock MD;  Location:  OR    MAMMOPLASTY REDUCTION Bilateral     MOHS MICROGRAPHIC PROCEDURE      left upper arm, melanoma    PICC DOUBLE LUMEN PLACEMENT  7/21/2024    PICC SINGLE LUMEN PLACEMENT  8/13/2021         PICC SINGLE LUMEN PLACEMENT  9/2/2021         SPINE SURGERY      L5 area surgery, decompression          Allergies:    Allergies   Allergen Reactions    Other Environmental Allergy Anaphylaxis     Bees/Wasps Stings  Bees/Wasps Stings    Adhesive Tape Other (See Comments)     Red,itchy and oozes  Red,itchy and oozes    Adhesive [Cyanoacrylate] Unknown     Other reaction(s): sores    Latex Hives    Oxycodone-Acetaminophen Rash    Vicodin [Hydrocodone-Acetaminophen] Nausea and Vomiting and Hives              Medication History:    Current Outpatient Medications   Medication Sig Dispense Refill    doxycycline hyclate (VIBRA-TABS) 100 MG tablet Take 1 tablet (100 mg) by mouth 2 times daily for 14 days 28 tablet 0    acetaminophen (TYLENOL) 325 MG tablet Take 3 tablets (975 mg) by mouth every 6 hours as needed for mild pain or fever 35 tablet 0    acetic acid 0.25 % irrigation Irrigate with as directed daily for 30 days 1000 mL 3    ascorbic acid (VITAMIN C) 250 MG CHEW chewable tablet Take 250 mg by mouth 2 times daily      aspirin (ASPIRIN 81) 81 MG chewable tablet Take 1 tablet by mouth every  morning      benzonatate (TESSALON) 100 MG capsule Take 1 capsule (100 mg) by mouth 3 times daily as needed for cough      Bioflavonoid Products (CINDY-C) 500-550 MG TABS Take 1 tablet by mouth 2 times daily 60 tablet 3    buPROPion (WELLBUTRIN SR) 150 MG 12 hr tablet Take 150 mg by mouth every morning       Carboxymethylcellulose Sodium 0.25 % SOLN Apply 1 drop to eye daily as needed      citalopram (CELEXA) 40 MG tablet Take 40 mg by mouth daily (with lunch)      ferrous sulfate (FEROSUL) 325 (65 Fe) MG tablet Take 325 mg by mouth three times a week      furosemide (LASIX) 20 MG tablet Take 20 mg by mouth 2 times daily      gabapentin (NEURONTIN) 100 MG capsule Take 100 mg by mouth 3 times daily      magnesium oxide (MAG-OX) 400 MG tablet Take 1 tablet by mouth every morning      multivitamin w/minerals (CENTRUM ADULTS) tablet Take 1 tablet by mouth every morning      nitroGLYcerin (NITROSTAT) 0.4 MG sublingual tablet PLACE 1 TABLET UNDER TONGUE EVERY 5 MINTUES AS NEEDED FOR CHEST PAIN FOR UP TO 30 DAYS      pantoprazole (PROTONIX) 40 MG EC tablet Take 40 mg by mouth every morning      rOPINIRole (REQUIP) 1 MG tablet Take 0.5 mg by mouth every morning In addition, patient takes one tablet (1 mg) in the evening      rOPINIRole (REQUIP) 1 MG tablet Take 1 mg by mouth every evening In addition, patient takes one half tablet (0.5 mg) in the morning      simvastatin (ZOCOR) 40 MG tablet [SIMVASTATIN (ZOCOR) 40 MG TABLET] Take 40 mg by mouth bedtime.      spironolactone (ALDACTONE) 25 MG tablet Take 25 mg by mouth every morning      vitamin B-Complex Take 1 tablet by mouth daily 60 tablet 3    vitamin D3 (CHOLECALCIFEROL) 250 mcg (13335 units) capsule Take 1 capsule (250 mcg) by mouth daily 60 capsule 3    Wound Cleansers (VASHE WOUND THERAPY EX) Externally apply topically daily as needed      zinc 50 MG TABS Take 1 tablet by mouth every morning       Current Facility-Administered Medications   Medication Dose Route  Frequency Provider Last Rate Last Admin    oxyCODONE (ROXICODONE) tablet 5 mg  5 mg Oral Q6H PRN Omero Shukla MD             Tobacco History:  reports that she quit smoking about 48 years ago. Her smoking use included cigarettes. She started smoking about 60 years ago. She has a 3 pack-year smoking history. She has never used smokeless tobacco.       REVIEW OF SYMPTOMS:   The review of systems was negative except as noted in the HPI.           PHYSICAL EXAMINATION:     /79   Pulse 69   SpO2 98%            GENERAL: The patient overall appears well and is no acute distress.   HEAD: normocephalic   EYES: Sclera and conjunctiva clear   NECK: no obvious masses   LUNGS: breathing is unlabored.   EXTREMITIES: No clubbing, cyanosis or edema   SKIN: No rashes or other abnormalities except as noted under the Wound section below.   NEUROLOGICAL: normal motor and sensory function   EDEMA: Sever  and Lymphedema       WOUND: The wound appears healthy with no sign of infection.   Wound bed: granulation tissue   Periwound: healthy intact skin  All the wounds on the left leg are healed.  The main full-thickness wound on the right side is actually smaller however she is has a number of very superficial open areas on the right leg.    Also see below for wound details:       Circumferential volume measures:            7/5/2024     9:00 AM 7/11/2024    11:00 AM 7/15/2024    11:00 AM 7/25/2024    11:00 AM 7/29/2024     1:00 PM   Circumferential Measures   Right just above MTP 26 23.5 26 24 22.7   Right Ankle 26 22.5 26 25 22.5   Right Widest Calf 63 58 58.5 58 55.5   Left - just above MTP 23 26 22 23 22.1   Left Ankle 24 23.2 24 23.5 23.3   Left Widest Calf 61 58 46 52.3 45.7       Ulceration(s)/Wound(s):   Please see the media tab under the chart review for pictures of the wounds.  Nursing staff removed dressings and cleansed wound.    VASC Wound right lower leg (Active)   Pre Size Length 4 08/08/24 1500   Pre Size Width 4  08/08/24 1500   Pre Size Depth 1 08/08/24 1500   Pre Total Sq cm 16 08/08/24 1500   Description weeping 08/08/24 1500       VASC Wound Rt lateral leg (Active)   Pre Size Length 27 08/08/24 1500   Pre Size Width 27 08/08/24 1500   Pre Size Depth 0.1 08/08/24 1500   Pre Total Sq cm 729 08/08/24 1500   Description posterior lateral and 08/08/24 1500       VASC Wound Rt medial leg (Active)   Pre Size Length 3 08/08/24 1500   Pre Size Width 1.2 08/08/24 1500   Pre Size Depth 0.1 08/08/24 1500   Pre Total Sq cm 3.6 08/08/24 1500       VASC Wound Left shin (Active)   Description scab 08/08/24 1500                                     Recent Labs   Lab Test 04/04/22  0523 06/23/16  1130   A1C 5.4 5.9          Recent Labs   Lab Test 02/10/23  1535 05/25/22  1637 04/04/22  0523   ALBUMIN 4.3 4.1 1.9*      October 27, 2021 ankle-brachial indices: Normal  October 27, 2021 right lower extremity venous insufficiency ultrasound: Normal      No sharp debridement performed today.         ASSESSMENT:   This is a 79 year old  female with lower extremity edema and bilateral leg ulcers          PLAN:   They will continue to apply an edema wear stocking to the left leg until lymphedema therapy starts her treatment.  For the right leg we will apply a zinc oxide barrier cream over any areas that it we will stick to followed by Hydrofera Blue, ABD pads and a multilayer compression wrap change 3 times a week by the home health nurses until lymphedema therapy starts and then they will use the lymphedema wraps from there for compression.    I have prescribed her an extended course of doxycycline for 14 days.    I have encouraged her to resume using her pneumatic lymphedema pumps.    She previously had a bilateral lower extremity venous insufficiency ultrasound performed on August 3, 2023 which did show areas of superficial venous insufficiency.  She then later met with Dr. Floyd on November 13, 2023 to discuss treatment options for her venous  insufficiency but he did not recommend any procedures at that time.    Separate from the discussion of her wound care we then discussed the treatment of her lower extremity edema.  I have encouraged the patient to continue to elevate the legs as they have been doing, including laying down with their legs above the level of the heart for 30 minutes at least twice a day.    I have encouraged her to use the pneumatic lymphedema pump once a day.  I have encouraged the patient to continue on their high protein diet to aid in wound healing.   The patient will return to the wound clinic to see me in 3-4 weeks.        30 minutes spent on the date of the encounter doing chart review, history and exam, documentation and further activities per the note      Lars Torres MD  08/08/2024   3:41 PM   [facility]  [phone]    This note was electronically signed by Lars Torres MD

## 2024-08-13 ENCOUNTER — MEDICAL CORRESPONDENCE (OUTPATIENT)
Dept: HEALTH INFORMATION MANAGEMENT | Facility: CLINIC | Age: 79
End: 2024-08-13
Payer: COMMERCIAL

## 2024-09-04 ENCOUNTER — HOSPITAL ENCOUNTER (INPATIENT)
Facility: HOSPITAL | Age: 79
LOS: 7 days | Discharge: HOME | DRG: 872 | End: 2024-09-11
Attending: INTERNAL MEDICINE | Admitting: INTERNAL MEDICINE
Payer: COMMERCIAL

## 2024-09-04 ENCOUNTER — APPOINTMENT (OUTPATIENT)
Dept: RADIOLOGY | Facility: CLINIC | Age: 79
End: 2024-09-04
Attending: STUDENT IN AN ORGANIZED HEALTH CARE EDUCATION/TRAINING PROGRAM
Payer: COMMERCIAL

## 2024-09-04 ENCOUNTER — HOSPITAL ENCOUNTER (EMERGENCY)
Facility: CLINIC | Age: 79
Discharge: OTHER INSTITUTION | End: 2024-09-04
Attending: STUDENT IN AN ORGANIZED HEALTH CARE EDUCATION/TRAINING PROGRAM | Admitting: STUDENT IN AN ORGANIZED HEALTH CARE EDUCATION/TRAINING PROGRAM
Payer: COMMERCIAL

## 2024-09-04 VITALS
HEIGHT: 61 IN | RESPIRATION RATE: 18 BRPM | SYSTOLIC BLOOD PRESSURE: 106 MMHG | OXYGEN SATURATION: 98 % | DIASTOLIC BLOOD PRESSURE: 59 MMHG | WEIGHT: 249 LBS | BODY MASS INDEX: 47.01 KG/M2 | TEMPERATURE: 100.1 F | HEART RATE: 73 BPM

## 2024-09-04 DIAGNOSIS — A41.9 SEPSIS, DUE TO UNSPECIFIED ORGANISM, UNSPECIFIED WHETHER ACUTE ORGAN DYSFUNCTION PRESENT (H): ICD-10-CM

## 2024-09-04 DIAGNOSIS — L97.912 ULCER OF RIGHT LOWER EXTREMITY WITH FAT LAYER EXPOSED (H): Primary | ICD-10-CM

## 2024-09-04 DIAGNOSIS — T14.8XXA PAIN ASSOCIATED WITH WOUND: ICD-10-CM

## 2024-09-04 DIAGNOSIS — L03.115 CELLULITIS OF RIGHT LOWER EXTREMITY: ICD-10-CM

## 2024-09-04 DIAGNOSIS — A41.52: ICD-10-CM

## 2024-09-04 DIAGNOSIS — R52 PAIN ASSOCIATED WITH WOUND: ICD-10-CM

## 2024-09-04 DIAGNOSIS — R65.20: ICD-10-CM

## 2024-09-04 DIAGNOSIS — J96.00: ICD-10-CM

## 2024-09-04 PROBLEM — L03.90 CELLULITIS: Status: ACTIVE | Noted: 2024-09-04

## 2024-09-04 LAB
ALBUMIN SERPL BCG-MCNC: 3.2 G/DL (ref 3.5–5.2)
ALP SERPL-CCNC: 220 U/L (ref 40–150)
ALT SERPL W P-5'-P-CCNC: 48 U/L (ref 0–50)
ANION GAP SERPL CALCULATED.3IONS-SCNC: 16 MMOL/L (ref 7–15)
AST SERPL W P-5'-P-CCNC: 67 U/L (ref 0–45)
ATRIAL RATE - MUSE: 103 BPM
BASE EXCESS BLDV CALC-SCNC: 5.1 MMOL/L (ref -3–3)
BASOPHILS # BLD AUTO: 0 10E3/UL (ref 0–0.2)
BASOPHILS NFR BLD AUTO: 0 %
BILIRUB SERPL-MCNC: 0.4 MG/DL
BUN SERPL-MCNC: 19.7 MG/DL (ref 8–23)
CALCIUM SERPL-MCNC: 8.7 MG/DL (ref 8.8–10.4)
CHLORIDE SERPL-SCNC: 102 MMOL/L (ref 98–107)
CREAT SERPL-MCNC: 1.04 MG/DL (ref 0.51–0.95)
DIASTOLIC BLOOD PRESSURE - MUSE: 63 MMHG
EGFRCR SERPLBLD CKD-EPI 2021: 55 ML/MIN/1.73M2
EOSINOPHIL # BLD AUTO: 0 10E3/UL (ref 0–0.7)
EOSINOPHIL NFR BLD AUTO: 0 %
ERYTHROCYTE [DISTWIDTH] IN BLOOD BY AUTOMATED COUNT: 14.8 % (ref 10–15)
FLUAV RNA SPEC QL NAA+PROBE: NEGATIVE
FLUBV RNA RESP QL NAA+PROBE: NEGATIVE
GLUCOSE SERPL-MCNC: 93 MG/DL (ref 70–99)
HCO3 BLDV-SCNC: 30 MMOL/L (ref 21–28)
HCO3 SERPL-SCNC: 23 MMOL/L (ref 22–29)
HCT VFR BLD AUTO: 33.4 % (ref 35–47)
HGB BLD-MCNC: 10.2 G/DL (ref 11.7–15.7)
IMM GRANULOCYTES # BLD: 0.1 10E3/UL
IMM GRANULOCYTES NFR BLD: 1 %
INTERPRETATION ECG - MUSE: NORMAL
LACTATE SERPL-SCNC: 1.3 MMOL/L (ref 0.7–2)
LYMPHOCYTES # BLD AUTO: 0.5 10E3/UL (ref 0.8–5.3)
LYMPHOCYTES NFR BLD AUTO: 2 %
MCH RBC QN AUTO: 26.2 PG (ref 26.5–33)
MCHC RBC AUTO-ENTMCNC: 30.5 G/DL (ref 31.5–36.5)
MCV RBC AUTO: 86 FL (ref 78–100)
MONOCYTES # BLD AUTO: 0.9 10E3/UL (ref 0–1.3)
MONOCYTES NFR BLD AUTO: 4 %
NEUTROPHILS # BLD AUTO: 19.7 10E3/UL (ref 1.6–8.3)
NEUTROPHILS NFR BLD AUTO: 93 %
NRBC # BLD AUTO: 0 10E3/UL
NRBC BLD AUTO-RTO: 0 /100
O2/TOTAL GAS SETTING VFR VENT: 21 %
OXYHGB MFR BLDV: 32 % (ref 70–75)
P AXIS - MUSE: 62 DEGREES
PCO2 BLDV: 51 MM HG (ref 40–50)
PH BLDV: 7.38 [PH] (ref 7.32–7.43)
PLATELET # BLD AUTO: 372 10E3/UL (ref 150–450)
PO2 BLDV: 22 MM HG (ref 25–47)
POTASSIUM SERPL-SCNC: 4 MMOL/L (ref 3.4–5.3)
PR INTERVAL - MUSE: 140 MS
PROT SERPL-MCNC: 7.1 G/DL (ref 6.4–8.3)
QRS DURATION - MUSE: 84 MS
QT - MUSE: 320 MS
QTC - MUSE: 419 MS
R AXIS - MUSE: -14 DEGREES
RBC # BLD AUTO: 3.9 10E6/UL (ref 3.8–5.2)
RSV RNA SPEC NAA+PROBE: NEGATIVE
SAO2 % BLDV: 32.9 % (ref 70–75)
SARS-COV-2 RNA RESP QL NAA+PROBE: NEGATIVE
SODIUM SERPL-SCNC: 141 MMOL/L (ref 135–145)
SYSTOLIC BLOOD PRESSURE - MUSE: 149 MMHG
T AXIS - MUSE: 76 DEGREES
VENTRICULAR RATE- MUSE: 103 BPM
WBC # BLD AUTO: 21.2 10E3/UL (ref 4–11)

## 2024-09-04 PROCEDURE — 96366 THER/PROPH/DIAG IV INF ADDON: CPT

## 2024-09-04 PROCEDURE — 36415 COLL VENOUS BLD VENIPUNCTURE: CPT | Performed by: STUDENT IN AN ORGANIZED HEALTH CARE EDUCATION/TRAINING PROGRAM

## 2024-09-04 PROCEDURE — 93005 ELECTROCARDIOGRAM TRACING: CPT | Performed by: EMERGENCY MEDICINE

## 2024-09-04 PROCEDURE — 80053 COMPREHEN METABOLIC PANEL: CPT | Performed by: STUDENT IN AN ORGANIZED HEALTH CARE EDUCATION/TRAINING PROGRAM

## 2024-09-04 PROCEDURE — 83605 ASSAY OF LACTIC ACID: CPT | Performed by: STUDENT IN AN ORGANIZED HEALTH CARE EDUCATION/TRAINING PROGRAM

## 2024-09-04 PROCEDURE — 99223 1ST HOSP IP/OBS HIGH 75: CPT | Mod: AI | Performed by: INTERNAL MEDICINE

## 2024-09-04 PROCEDURE — 87637 SARSCOV2&INF A&B&RSV AMP PRB: CPT | Performed by: STUDENT IN AN ORGANIZED HEALTH CARE EDUCATION/TRAINING PROGRAM

## 2024-09-04 PROCEDURE — 87040 BLOOD CULTURE FOR BACTERIA: CPT | Performed by: STUDENT IN AN ORGANIZED HEALTH CARE EDUCATION/TRAINING PROGRAM

## 2024-09-04 PROCEDURE — 96365 THER/PROPH/DIAG IV INF INIT: CPT

## 2024-09-04 PROCEDURE — 71046 X-RAY EXAM CHEST 2 VIEWS: CPT

## 2024-09-04 PROCEDURE — 99285 EMERGENCY DEPT VISIT HI MDM: CPT | Mod: 25

## 2024-09-04 PROCEDURE — 82805 BLOOD GASES W/O2 SATURATION: CPT | Performed by: STUDENT IN AN ORGANIZED HEALTH CARE EDUCATION/TRAINING PROGRAM

## 2024-09-04 PROCEDURE — 96367 TX/PROPH/DG ADDL SEQ IV INF: CPT

## 2024-09-04 PROCEDURE — 96375 TX/PRO/DX INJ NEW DRUG ADDON: CPT

## 2024-09-04 PROCEDURE — 250N000013 HC RX MED GY IP 250 OP 250 PS 637: Performed by: INTERNAL MEDICINE

## 2024-09-04 PROCEDURE — 250N000013 HC RX MED GY IP 250 OP 250 PS 637: Performed by: STUDENT IN AN ORGANIZED HEALTH CARE EDUCATION/TRAINING PROGRAM

## 2024-09-04 PROCEDURE — 250N000011 HC RX IP 250 OP 636: Performed by: STUDENT IN AN ORGANIZED HEALTH CARE EDUCATION/TRAINING PROGRAM

## 2024-09-04 PROCEDURE — 120N000001 HC R&B MED SURG/OB

## 2024-09-04 PROCEDURE — 250N000011 HC RX IP 250 OP 636: Performed by: INTERNAL MEDICINE

## 2024-09-04 PROCEDURE — 85025 COMPLETE CBC W/AUTO DIFF WBC: CPT | Performed by: STUDENT IN AN ORGANIZED HEALTH CARE EDUCATION/TRAINING PROGRAM

## 2024-09-04 PROCEDURE — 258N000003 HC RX IP 258 OP 636: Performed by: STUDENT IN AN ORGANIZED HEALTH CARE EDUCATION/TRAINING PROGRAM

## 2024-09-04 RX ORDER — ASPIRIN 81 MG/1
81 TABLET, CHEWABLE ORAL EVERY MORNING
Status: DISCONTINUED | OUTPATIENT
Start: 2024-09-05 | End: 2024-09-11 | Stop reason: HOSPADM

## 2024-09-04 RX ORDER — DIPHENHYDRAMINE HCL 25 MG
25 CAPSULE ORAL EVERY 6 HOURS PRN
Status: DISCONTINUED | OUTPATIENT
Start: 2024-09-04 | End: 2024-09-11 | Stop reason: HOSPADM

## 2024-09-04 RX ORDER — ROPINIROLE 1 MG/1
.5-1 TABLET, FILM COATED ORAL DAILY PRN
COMMUNITY

## 2024-09-04 RX ORDER — NALOXONE HYDROCHLORIDE 0.4 MG/ML
0.2 INJECTION, SOLUTION INTRAMUSCULAR; INTRAVENOUS; SUBCUTANEOUS
Status: DISCONTINUED | OUTPATIENT
Start: 2024-09-04 | End: 2024-09-11 | Stop reason: HOSPADM

## 2024-09-04 RX ORDER — NALOXONE HYDROCHLORIDE 0.4 MG/ML
0.4 INJECTION, SOLUTION INTRAMUSCULAR; INTRAVENOUS; SUBCUTANEOUS
Status: DISCONTINUED | OUTPATIENT
Start: 2024-09-04 | End: 2024-09-11 | Stop reason: HOSPADM

## 2024-09-04 RX ORDER — HEPARIN SODIUM 5000 [USP'U]/.5ML
5000 INJECTION, SOLUTION INTRAVENOUS; SUBCUTANEOUS EVERY 8 HOURS
Status: DISCONTINUED | OUTPATIENT
Start: 2024-09-04 | End: 2024-09-11 | Stop reason: HOSPADM

## 2024-09-04 RX ORDER — ACETAMINOPHEN 325 MG/1
650 TABLET ORAL EVERY 4 HOURS PRN
Status: DISCONTINUED | OUTPATIENT
Start: 2024-09-04 | End: 2024-09-11 | Stop reason: HOSPADM

## 2024-09-04 RX ORDER — ACETAMINOPHEN 650 MG/1
650 SUPPOSITORY RECTAL EVERY 4 HOURS PRN
Status: DISCONTINUED | OUTPATIENT
Start: 2024-09-04 | End: 2024-09-11 | Stop reason: HOSPADM

## 2024-09-04 RX ORDER — ACETAMINOPHEN 500 MG
1000 TABLET ORAL EVERY EVENING
COMMUNITY

## 2024-09-04 RX ORDER — SIMVASTATIN 10 MG
40 TABLET ORAL AT BEDTIME
Status: DISCONTINUED | OUTPATIENT
Start: 2024-09-04 | End: 2024-09-11 | Stop reason: HOSPADM

## 2024-09-04 RX ORDER — VANCOMYCIN 1.75 GRAM/500 ML IN 0.9 % SODIUM CHLORIDE INTRAVENOUS
1750 ONCE
Status: COMPLETED | OUTPATIENT
Start: 2024-09-04 | End: 2024-09-04

## 2024-09-04 RX ORDER — PIPERACILLIN SODIUM, TAZOBACTAM SODIUM 3; .375 G/15ML; G/15ML
3.38 INJECTION, POWDER, LYOPHILIZED, FOR SOLUTION INTRAVENOUS EVERY 8 HOURS
Status: DISCONTINUED | OUTPATIENT
Start: 2024-09-04 | End: 2024-09-08

## 2024-09-04 RX ORDER — ROPINIROLE 0.25 MG/1
.5-1 TABLET, FILM COATED ORAL DAILY PRN
Status: DISCONTINUED | OUTPATIENT
Start: 2024-09-04 | End: 2024-09-11 | Stop reason: HOSPADM

## 2024-09-04 RX ORDER — DIPHENHYDRAMINE HCL 25 MG
25 CAPSULE ORAL EVERY 6 HOURS PRN
Status: DISCONTINUED | OUTPATIENT
Start: 2024-09-04 | End: 2024-09-04 | Stop reason: HOSPADM

## 2024-09-04 RX ORDER — ROPINIROLE 1 MG/1
1 TABLET, FILM COATED ORAL EVERY EVENING
Status: DISCONTINUED | OUTPATIENT
Start: 2024-09-04 | End: 2024-09-11 | Stop reason: HOSPADM

## 2024-09-04 RX ORDER — FERROUS SULFATE 325(65) MG
325 TABLET ORAL
Status: DISCONTINUED | OUTPATIENT
Start: 2024-09-06 | End: 2024-09-11 | Stop reason: HOSPADM

## 2024-09-04 RX ORDER — BUPROPION HYDROCHLORIDE 150 MG/1
150 TABLET, EXTENDED RELEASE ORAL EVERY MORNING
Status: DISCONTINUED | OUTPATIENT
Start: 2024-09-05 | End: 2024-09-11 | Stop reason: HOSPADM

## 2024-09-04 RX ORDER — PANTOPRAZOLE SODIUM 40 MG/1
40 TABLET, DELAYED RELEASE ORAL EVERY MORNING
Status: DISCONTINUED | OUTPATIENT
Start: 2024-09-05 | End: 2024-09-11 | Stop reason: HOSPADM

## 2024-09-04 RX ORDER — CITALOPRAM HYDROBROMIDE 20 MG/1
40 TABLET ORAL
Status: DISCONTINUED | OUTPATIENT
Start: 2024-09-05 | End: 2024-09-11 | Stop reason: HOSPADM

## 2024-09-04 RX ORDER — KETOROLAC TROMETHAMINE 15 MG/ML
15 INJECTION, SOLUTION INTRAMUSCULAR; INTRAVENOUS ONCE
Status: COMPLETED | OUTPATIENT
Start: 2024-09-04 | End: 2024-09-04

## 2024-09-04 RX ORDER — HYDRALAZINE HYDROCHLORIDE 20 MG/ML
10 INJECTION INTRAMUSCULAR; INTRAVENOUS EVERY 4 HOURS PRN
Status: DISCONTINUED | OUTPATIENT
Start: 2024-09-04 | End: 2024-09-10

## 2024-09-04 RX ORDER — HYDROMORPHONE HCL IN WATER/PF 6 MG/30 ML
.2-.4 PATIENT CONTROLLED ANALGESIA SYRINGE INTRAVENOUS
Status: DISCONTINUED | OUTPATIENT
Start: 2024-09-04 | End: 2024-09-10

## 2024-09-04 RX ORDER — POTASSIUM CHLORIDE 1500 MG/1
20 TABLET, EXTENDED RELEASE ORAL EVERY EVENING
COMMUNITY

## 2024-09-04 RX ORDER — HYDROMORPHONE HYDROCHLORIDE 1 MG/ML
0.5 INJECTION, SOLUTION INTRAMUSCULAR; INTRAVENOUS; SUBCUTANEOUS ONCE
Status: COMPLETED | OUTPATIENT
Start: 2024-09-04 | End: 2024-09-04

## 2024-09-04 RX ORDER — AMOXICILLIN 250 MG
2 CAPSULE ORAL 2 TIMES DAILY PRN
Status: DISCONTINUED | OUTPATIENT
Start: 2024-09-04 | End: 2024-09-11 | Stop reason: HOSPADM

## 2024-09-04 RX ORDER — PROCHLORPERAZINE MALEATE 5 MG
5 TABLET ORAL EVERY 6 HOURS PRN
Status: DISCONTINUED | OUTPATIENT
Start: 2024-09-04 | End: 2024-09-11 | Stop reason: HOSPADM

## 2024-09-04 RX ORDER — ACETAMINOPHEN 500 MG
500 TABLET ORAL 2 TIMES DAILY
COMMUNITY

## 2024-09-04 RX ORDER — ROPINIROLE 0.25 MG/1
0.5 TABLET, FILM COATED ORAL EVERY MORNING
Status: DISCONTINUED | OUTPATIENT
Start: 2024-09-05 | End: 2024-09-11 | Stop reason: HOSPADM

## 2024-09-04 RX ORDER — PIPERACILLIN SODIUM, TAZOBACTAM SODIUM 3; .375 G/15ML; G/15ML
3.38 INJECTION, POWDER, LYOPHILIZED, FOR SOLUTION INTRAVENOUS ONCE
Status: COMPLETED | OUTPATIENT
Start: 2024-09-04 | End: 2024-09-04

## 2024-09-04 RX ORDER — MAGNESIUM OXIDE 400 MG/1
400 TABLET ORAL EVERY MORNING
Status: DISCONTINUED | OUTPATIENT
Start: 2024-09-05 | End: 2024-09-11 | Stop reason: HOSPADM

## 2024-09-04 RX ORDER — POLYETHYLENE GLYCOL 3350 17 G/17G
17 POWDER, FOR SOLUTION ORAL 2 TIMES DAILY PRN
Status: DISCONTINUED | OUTPATIENT
Start: 2024-09-04 | End: 2024-09-11 | Stop reason: HOSPADM

## 2024-09-04 RX ORDER — ACETAMINOPHEN 325 MG/1
975 TABLET ORAL ONCE
Status: COMPLETED | OUTPATIENT
Start: 2024-09-04 | End: 2024-09-04

## 2024-09-04 RX ORDER — CALCIUM CARBONATE 500 MG/1
1000 TABLET, CHEWABLE ORAL 4 TIMES DAILY PRN
Status: DISCONTINUED | OUTPATIENT
Start: 2024-09-04 | End: 2024-09-11 | Stop reason: HOSPADM

## 2024-09-04 RX ORDER — PROCHLORPERAZINE 25 MG
12.5 SUPPOSITORY, RECTAL RECTAL EVERY 12 HOURS PRN
Status: DISCONTINUED | OUTPATIENT
Start: 2024-09-04 | End: 2024-09-11 | Stop reason: HOSPADM

## 2024-09-04 RX ORDER — OXYCODONE HYDROCHLORIDE 5 MG/1
5 TABLET ORAL EVERY 6 HOURS PRN
Status: DISCONTINUED | OUTPATIENT
Start: 2024-09-04 | End: 2024-09-04

## 2024-09-04 RX ORDER — ONDANSETRON 2 MG/ML
4 INJECTION INTRAMUSCULAR; INTRAVENOUS EVERY 6 HOURS PRN
Status: DISCONTINUED | OUTPATIENT
Start: 2024-09-04 | End: 2024-09-11 | Stop reason: HOSPADM

## 2024-09-04 RX ORDER — AMOXICILLIN 250 MG
1 CAPSULE ORAL 2 TIMES DAILY PRN
Status: DISCONTINUED | OUTPATIENT
Start: 2024-09-04 | End: 2024-09-11 | Stop reason: HOSPADM

## 2024-09-04 RX ORDER — GABAPENTIN 100 MG/1
100 CAPSULE ORAL 3 TIMES DAILY
Status: DISCONTINUED | OUTPATIENT
Start: 2024-09-04 | End: 2024-09-11 | Stop reason: HOSPADM

## 2024-09-04 RX ORDER — ONDANSETRON 4 MG/1
4 TABLET, ORALLY DISINTEGRATING ORAL EVERY 6 HOURS PRN
Status: DISCONTINUED | OUTPATIENT
Start: 2024-09-04 | End: 2024-09-11 | Stop reason: HOSPADM

## 2024-09-04 RX ORDER — VANCOMYCIN HYDROCHLORIDE 1 G/200ML
1000 INJECTION, SOLUTION INTRAVENOUS EVERY 24 HOURS
Status: DISCONTINUED | OUTPATIENT
Start: 2024-09-05 | End: 2024-09-06 | Stop reason: DRUGHIGH

## 2024-09-04 RX ORDER — LANOLIN ALCOHOL/MO/W.PET/CERES
3 CREAM (GRAM) TOPICAL
Status: DISCONTINUED | OUTPATIENT
Start: 2024-09-04 | End: 2024-09-11 | Stop reason: HOSPADM

## 2024-09-04 RX ORDER — POTASSIUM CHLORIDE 1500 MG/1
20 TABLET, EXTENDED RELEASE ORAL EVERY EVENING
Status: DISCONTINUED | OUTPATIENT
Start: 2024-09-04 | End: 2024-09-11 | Stop reason: HOSPADM

## 2024-09-04 RX ORDER — NITROGLYCERIN 0.4 MG/1
0.4 TABLET SUBLINGUAL EVERY 5 MIN PRN
Status: DISCONTINUED | OUTPATIENT
Start: 2024-09-04 | End: 2024-09-11 | Stop reason: HOSPADM

## 2024-09-04 RX ADMIN — ROPINIROLE HYDROCHLORIDE 1 MG: 1 TABLET, FILM COATED ORAL at 22:31

## 2024-09-04 RX ADMIN — PIPERACILLIN AND TAZOBACTAM 3.38 G: 3; .375 INJECTION, POWDER, FOR SOLUTION INTRAVENOUS at 11:19

## 2024-09-04 RX ADMIN — GABAPENTIN 100 MG: 100 CAPSULE ORAL at 22:27

## 2024-09-04 RX ADMIN — VANCOMYCIN HYDROCHLORIDE 1750 MG: 5 INJECTION, POWDER, LYOPHILIZED, FOR SOLUTION INTRAVENOUS at 12:47

## 2024-09-04 RX ADMIN — KETOROLAC TROMETHAMINE 15 MG: 15 INJECTION, SOLUTION INTRAMUSCULAR; INTRAVENOUS at 13:51

## 2024-09-04 RX ADMIN — HYDROMORPHONE HYDROCHLORIDE 0.4 MG: 0.2 INJECTION, SOLUTION INTRAMUSCULAR; INTRAVENOUS; SUBCUTANEOUS at 22:23

## 2024-09-04 RX ADMIN — POTASSIUM CHLORIDE 20 MEQ: 1500 TABLET, EXTENDED RELEASE ORAL at 22:31

## 2024-09-04 RX ADMIN — PIPERACILLIN AND TAZOBACTAM 3.38 G: 3; .375 INJECTION, POWDER, FOR SOLUTION INTRAVENOUS at 23:53

## 2024-09-04 RX ADMIN — ACETAMINOPHEN 975 MG: 325 TABLET ORAL at 11:15

## 2024-09-04 RX ADMIN — HEPARIN SODIUM 5000 UNITS: 10000 INJECTION, SOLUTION INTRAVENOUS; SUBCUTANEOUS at 22:32

## 2024-09-04 RX ADMIN — DIPHENHYDRAMINE HYDROCHLORIDE 25 MG: 25 CAPSULE ORAL at 13:57

## 2024-09-04 RX ADMIN — HYDROMORPHONE HYDROCHLORIDE 0.5 MG: 1 INJECTION, SOLUTION INTRAMUSCULAR; INTRAVENOUS; SUBCUTANEOUS at 11:15

## 2024-09-04 RX ADMIN — SIMVASTATIN 40 MG: 10 TABLET, FILM COATED ORAL at 22:26

## 2024-09-04 ASSESSMENT — ACTIVITIES OF DAILY LIVING (ADL)
ADLS_ACUITY_SCORE: 38

## 2024-09-04 ASSESSMENT — COLUMBIA-SUICIDE SEVERITY RATING SCALE - C-SSRS
6. HAVE YOU EVER DONE ANYTHING, STARTED TO DO ANYTHING, OR PREPARED TO DO ANYTHING TO END YOUR LIFE?: NO
2. HAVE YOU ACTUALLY HAD ANY THOUGHTS OF KILLING YOURSELF IN THE PAST MONTH?: NO
1. IN THE PAST MONTH, HAVE YOU WISHED YOU WERE DEAD OR WISHED YOU COULD GO TO SLEEP AND NOT WAKE UP?: NO

## 2024-09-04 NOTE — ED NOTES
Bed: [facility]  Expected date:   Expected time:   Means of arrival:   Comments:  Maybe ems

## 2024-09-04 NOTE — ED PROVIDER NOTES
EMERGENCY DEPARTMENT ENCOUNTER      NAME: Elizabeth Kelley  AGE: 79 year old female  YOB: 1945  MRN: [id number]  EVALUATION DATE & TIME: 9/4/2024 10:19 AM    PCP: No Ref-Primary, Physician    ED PROVIDER: Salvatore Malave MD      Chief Complaint   Patient presents with    Generalized Weakness    Fever         FINAL IMPRESSION:  1. Cellulitis of right lower extremity    2. Sepsis, due to unspecified organism, unspecified whether acute organ dysfunction present (H)          ED COURSE & MEDICAL DECISION MAKING:    Pertinent Labs & Imaging studies reviewed. (See chart for details)  79 year old female presents to the Emergency Department for evaluation of fever, leg pain    ED Course as of 09/04/24 1603   Wed Sep 04, 2024   1102 Patient is a 79-year-old female with history of chronic lower extremity edema and chronic wound on the right lower extremity for which she receives wound care every other day as well as history of peripheral venous insufficiency, prior sepsis and hypertension who presents to the emergency department for evaluation of generalized fatigue, fevers and worsening leg pain.  Patient had her bandage changed yesterday on her right lower extremity since then has been having worsening pain.  Is also noted fevers at home and some chills.  States she has a somewhat chronic cough that happens with the change in seasons but denies any chest pain or shortness of breath.  Is had nausea without any vomiting.  Has had intermittent headache for the past few days as well.  No known sick contacts.  She is not currently on any antibiotics for right lower extremity wound.    On exam here patient is tachycardic, saturating 88% on room air and febrile at 102.9  F.  She is awake alert and oriented answers questions appropriately.  Normal work of breathing.  No wheezes or rails.  Heart is tachycardic but otherwise warm and well-perfused extremities.  Abdomen soft and nontender.  No meningismus patient has  extensive bandages on the right lower extremity which were removed and she has a significant erythema weeping edema of the right lower extremity up to the knee that circumferential, right foot is warm and well-perfused but does have significant tenderness palpation of the right lateral calf.    Presentation concerning for sepsis most likely cellulitis as source but will obtain septic workup including blood culture as well as a chest x-ray and a urine.  Will start empiric antibiotics given her fever, tachycardia and hypoxia at presentation as well as start with a liter of fluid although patient's not hypotensive at this point in time.  Will also give Tylenol for antipyresis.   1458 Labs reviewed interpret myself.  CBC notable for marked leukocytosis.  Anemia but stable compared to prior.  Chemistry shows slight anion gap at 16 but normal bicarb and lactate within normal limits.  Otherwise  slight elevation in creatinine to 1.0 from baseline around 0.8.    Chest x-ray does not show any signs of pneumonia.  This point in time suspect source of patient's fever presentation is cellulitis she does have significant erythema of the right lower extremity and chronic wound.  Restarted on broad-spectrum antibiotics with vancomycin and Zosyn.      Upon repeat evaluation patient's pain is improved.  Does look like she was transiently borderline hypotensive but my repeat evaluation patient is mentating well and hypotension is resolved.  Will continue with fluid resuscitation and believe patient requires admission at this point in time for ongoing treatment of cellulitis.  Bed capacity currently critical here at [facility] at all time transferred to [facility] if a bed is available there.     Discussed with hospitalist Dr. Rosenberg at [facility] who agrees to except patient for transfer admit to patient medicine for bed.  Patient will be transferred once transportation is arranged.  Patient updated with the plan  and is   agreeable.      10:38 AM I met and evaluated the patient.       Medical Decision Making  Obtained supplemental history:Supplemental history obtained?: No  Reviewed external records: External records reviewed?: Inpatient Record: [facility] Admission 7/16-7/23/2024  Care impacted by chronic illness:Cancer/Chemotherapy, Hyperlipidemia, Hypertension, and Other: MS  Care significantly affected by social determinants of health:Access to Medical Care  Did you consider but not order tests?: Work up considered but not performed and documented in chart, if applicable  Did you interpret images independently?: Independent interpretation of ECG and images noted in documentation, when applicable.  Consultation discussion with other provider:Did you involve another provider (consultant, , pharmacy, etc.)?: I discussed the care with another health care provider, see documentation for details.  Admit.              At the conclusion of the encounter I discussed the results of all of the tests and the disposition. The questions were answered. The patient or family acknowledged understanding and was agreeable with the care plan.     0 minutes of critical care time     MEDICATIONS GIVEN IN THE EMERGENCY:  Medications   sodium chloride (PF) 0.9% PF flush 3 mL (has no administration in time range)   sodium chloride (PF) 0.9% PF flush 3 mL (3 mLs Intracatheter $Given 9/4/24 1115)   diphenhydrAMINE (BENADRYL) capsule 25 mg (25 mg Oral $Given 9/4/24 1357)   lactated ringers BOLUS 1,000 mL (has no administration in time range)   piperacillin-tazobactam (ZOSYN) 3.375 g vial to attach to  mL bag (0 g Intravenous Stopped 9/4/24 1247)   acetaminophen (TYLENOL) tablet 975 mg (975 mg Oral $Given 9/4/24 1115)   HYDROmorphone (PF) (DILAUDID) injection 0.5 mg (0.5 mg Intravenous $Given 9/4/24 1115)   vancomycin (VANCOCIN) 1,750 mg in 0.9% NaCl 500 mL intermittent infusion (1,750 mg Intravenous $New Bag 9/4/24 1247)   ketorolac (TORADOL)  injection 15 mg (15 mg Intravenous $Given 9/4/24 2081)       NEW PRESCRIPTIONS STARTED AT TODAY'S ER VISIT  New Prescriptions    No medications on file          =================================================================    HPI    Patient information was obtained from: patient    Use of : N/A         Elizabeth Kelley is a 79 year old female with a pertinent history of melanoma, PICC line (7/21/2024), lymphedema of bilateral lower extremities, sepsis, HTN, HLD, MS, morbid obesity, who presents to this ED by EMS for evaluation of fever and generalized weakness.    Patient endorses a history of lymphedema of bilateral lower extremities and chronic wound on her right lower extremity for 4 years. She has a wound care nurse that comes to her house every Monday, Thursday, and Saturday. Recently had acute cellulitis in July 2024 and was placed on a 5 day course of doxycycline which she did complete. Hasn't been on antibiotics since then. Had wound care come yesterday (9/3/2024) to change bandages and they noted that wound has been improving. Starting last night, she developed generalized body aches, nausea, and increased right lower extremity pain that's persistent. Symptoms continued into this morning when she developed fever and chills today. Hasn't taken any pain medications or antipyretics. Due to concerns for infection, she called EMS. Also endorses an intermittent headache since Sunday (9/1/2024). Denies recent sick contacts. Denies allergies to antibiotics.    She otherwise denies associating vomiting, abdominal pain, sore throat, rhinorrhea, dysuria, hematuria, and cough. Doesn't use supplemental O2 at baseline. Of note, she has an upcoming PCP appointment tomorrow (9/5/2024). There were no other concerns/complaints at this time.    Per chart review:  -7/16/2024-7/23/2024: Patient was admitted at [facility] for acute cellulitis of right lower extremity and cellulitis. She initially  presented to the ED with fever (102F), fatigue, generalized body aches, and diarrhea (felt dehydrated). Patient was admitted for sepsis and was started on vancomycin and fluids in the ED. During admission, blood cultures on 7/16 was NGTD and urine culture grew urogenital trang. She did have some cough and Tachypnea on 7/19 but chest X-ray was normal and viral swabs were negative. Also had spiked temperature of 100.5F on 7/19. ID consulted and recommended to continue vancomycin during admission and to discharge on 5 day course of doxycycline. Wound care was consulted. Venous US of legs were negative for blood clots. She was discharged home on 5 day course of doxycycline 100 mg BID. Home health was offered upon discharge but she declined.       REVIEW OF SYSTEMS   Refer to the [facility]    PAST MEDICAL HISTORY:  Past Medical History:   Diagnosis Date    Ankle contracture, left     Class 3 severe obesity due to excess calories without serious comorbidity with body mass index (BMI) of 45.0 to 49.9 in adult (H)     Combined gastric and duodenal ulcer     Controlled substance agreement broken     Depression     Dyslipidemia     Edema     Edema     Gait abnormality     GERD (gastroesophageal reflux disease)     Gout     Lymphedema of both lower extremities     Melanoma (H)     left upper arm    MS (multiple sclerosis) (H)     RLS (restless legs syndrome)     Skin ulcer of left lower leg (H)     Valgus deformity of both feet     Vitamin D deficiency        PAST SURGICAL HISTORY:  Past Surgical History:   Procedure Laterality Date    ABLATION SAPHENOUS VEIN W/ RFA Right 04/12/2021    Saphenous vein, anterior accessory saphenous vein, sclerotherapy of multiple veins.    COSMETIC SURGERY  1988    reduction of excess skin from weight loss    ESOPHAGOSCOPY, GASTROSCOPY, DUODENOSCOPY (EGD), COMBINED N/A 03/30/2015    Procedure: UPPER ENDOSCOPY with gastric biopsy;  Surgeon: Checo Fletcher MD;  Location: [facility];  Service:      IRRIGATION AND DEBRIDEMENT LOWER EXTREMITY, COMBINED Right 3/21/2022    Procedure: RIGHT LEG WOUND DEBRIDEMENT, PAULIE DERMATONE, MISONIX, VAC VERA FLOW WITH VASHE;  Surgeon: Gabriele Bullock MD;  Location: [facility] OR    IRRIGATION AND DEBRIDEMENT LOWER EXTREMITY, COMBINED Right 3/28/2022    Procedure: DEBRIDEMENT AND WOUND DRESSING CHANGE AND MYRIAD APPLICATION OF RIGHT LOWER LEG WOUND;  Surgeon: Gabriele Bullock MD;  Location: [facility] OR    MAMMOPLASTY REDUCTION Bilateral     MOHS MICROGRAPHIC PROCEDURE      left upper arm, melanoma    PICC DOUBLE LUMEN PLACEMENT  7/21/2024    PICC SINGLE LUMEN PLACEMENT  8/13/2021         PICC SINGLE LUMEN PLACEMENT  9/2/2021         SPINE SURGERY      L5 area surgery, decompression           CURRENT MEDICATIONS:    acetaminophen (TYLENOL) 500 MG tablet  acetaminophen (TYLENOL) 500 MG tablet  ascorbic acid (VITAMIN C) 250 MG CHEW chewable tablet  aspirin (ASPIRIN 81) 81 MG chewable tablet  Bioflavonoid Products (CINDY-C/BIOFLAVONOIDS PO)  buPROPion (WELLBUTRIN SR) 150 MG 12 hr tablet  citalopram (CELEXA) 40 MG tablet  ferrous sulfate (FEROSUL) 325 (65 Fe) MG tablet  furosemide (LASIX) 20 MG tablet  gabapentin (NEURONTIN) 100 MG capsule  magnesium oxide (MAG-OX) 400 MG tablet  multivitamin w/minerals (CENTRUM ADULTS) tablet  nitroGLYcerin (NITROSTAT) 0.4 MG sublingual tablet  pantoprazole (PROTONIX) 40 MG EC tablet  potassium chloride ER (K-TAB) 20 MEQ CR tablet  rOPINIRole (REQUIP) 1 MG tablet  rOPINIRole (REQUIP) 1 MG tablet  rOPINIRole (REQUIP) 1 MG tablet  simvastatin (ZOCOR) 40 MG tablet  spironolactone (ALDACTONE) 25 MG tablet  vitamin B-Complex  vitamin D3 (CHOLECALCIFEROL) 250 mcg (82973 units) capsule  Wound Cleansers (VASHE WOUND THERAPY EX)  zinc 50 MG TABS  Carboxymethylcellulose Sodium 0.25 % SOLN        ALLERGIES:  Allergies   Allergen Reactions    Other Environmental Allergy Anaphylaxis     Bees/Wasps Stings  Bees/Wasps Stings    Adhesive Tape Other (See Comments)      "Red,itchy and oozes  Red,itchy and oozes    Adhesive [Cyanoacrylate] Unknown     Other reaction(s): sores    Latex Hives    Oxycodone-Acetaminophen Rash    Vicodin [Hydrocodone-Acetaminophen] Nausea and Vomiting and Hives       FAMILY HISTORY:  Family History   Problem Relation Age of Onset    Uterine Cancer Mother     Hyperlipidemia Mother     Hypertension Mother     Multiple Sclerosis Mother     Asthma Sister     Obesity Sister     Hyperlipidemia Sister     Hypertension Sister     Multiple Sclerosis Sister     Arthritis Sister     Colon Cancer Paternal Aunt     Breast Cancer Paternal Aunt     Hypertension Paternal Aunt     Hyperlipidemia Paternal Aunt     Brain Cancer Father     Arthritis Maternal Uncle     Arthritis Maternal Grandmother     Early Death Maternal Grandfather     Arthritis Paternal Grandmother     Arthritis Paternal Grandfather        SOCIAL HISTORY:   Social History     Socioeconomic History    Marital status: Single   Tobacco Use    Smoking status: Former     Current packs/day: 0.00     Average packs/day: 0.3 packs/day for 12.0 years (3.0 ttl pk-yrs)     Types: Cigarettes     Start date: 1963     Quit date: 1975     Years since quittin.7    Smokeless tobacco: Never    Tobacco comments:     quit more than 30 years ago   Substance and Sexual Activity    Alcohol use: Yes     Alcohol/week: 1.0 standard drink of alcohol     Types: 1 Standard drinks or equivalent per week     Comment: glass of wine once a week    Drug use: No    Sexual activity: Yes     Partners: Male     Birth control/protection: Post-menopausal   Social History Narrative    .   Has significant other.  2 grown children.  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         VITALS:  /54   Pulse 83   Temp 100.1  F (37.8  C) (Oral)   Resp 18   Ht 1.549 m (5' 1\")   Wt 112.9 kg (249 lb)   SpO2 95%   BMI 47.05 kg/m      PHYSICAL EXAM    Constitutional: Well developed, Well nourished, NAD,  HENT: " Normocephalic, Atraumatic,  mucous membranes moist,   Neck- trachea midline, No stridor.    Eyes:EOMI, Conjunctiva normal, No discharge.   Respiratory: Normal breath sounds, normal work of breathing, No respiratory distress, No wheezing or rales. On 2 L of NC.    Cardiovascular: Borderline tachycardia, Regular rhythm,  No murmurs,   Abdominal: Soft, No tenderness, No rebound or guarding.     Musculoskeletal: no deformity or malalignment   Integument: On right lower extremity: has extensive erythema of the entire right lower extremity below the knee, weeping edema, significant tenderness to palpation to lateral calf.  Neurologic: Alert & oriented x 3   Psychiatric: Affect normal, Cooperative.      LAB:  All pertinent labs reviewed and interpreted.  Results for orders placed or performed during the hospital encounter of 09/04/24   XR Chest 2 Views    Impression    IMPRESSION: Shallow inspiration exaggerates heart size and pulmonary vascularity which are within normal limits. Mild consolidation and/or volume loss in the posterior basilar right lower lobe. Lungs otherwise clear. No visible pleural fluid. Mild   segmental elevation right hemidiaphragm unchanged. Moderate size esophageal hiatal hernia unchanged.   Comprehensive metabolic panel   Result Value Ref Range    Sodium 141 135 - 145 mmol/L    Potassium 4.0 3.4 - 5.3 mmol/L    Carbon Dioxide (CO2) 23 22 - 29 mmol/L    Anion Gap 16 (H) 7 - 15 mmol/L    Urea Nitrogen 19.7 8.0 - 23.0 mg/dL    Creatinine 1.04 (H) 0.51 - 0.95 mg/dL    GFR Estimate 55 (L) >60 mL/min/1.73m2    Calcium 8.7 (L) 8.8 - 10.4 mg/dL    Chloride 102 98 - 107 mmol/L    Glucose 93 70 - 99 mg/dL    Alkaline Phosphatase 220 (H) 40 - 150 U/L    AST 67 (H) 0 - 45 U/L    ALT 48 0 - 50 U/L    Protein Total 7.1 6.4 - 8.3 g/dL    Albumin 3.2 (L) 3.5 - 5.2 g/dL    Bilirubin Total 0.4 <=1.2 mg/dL   Lactic Acid Whole Blood with 1X Repeat in 2 HR when >2   Result Value Ref Range    Lactic Acid, Initial 1.3  0.7 - 2.0 mmol/L   Blood gas venous   Result Value Ref Range    pH Venous 7.38 7.32 - 7.43    pCO2 Venous 51 (H) 40 - 50 mm Hg    pO2 Venous 22 (L) 25 - 47 mm Hg    Bicarbonate Venous 30 (H) 21 - 28 mmol/L    Base Excess/Deficit Venous 5.1 (H) -3.0 - 3.0 mmol/L    FIO2 21     Oxyhemoglobin Venous 32 (L) 70 - 75 %    O2 Sat, Venous 32.9 (L) 70.0 - 75.0 %   Symptomatic Influenza A/B, RSV, & SARS-CoV2 PCR (COVID-19) Nasopharyngeal    Specimen: Nasopharyngeal; Swab   Result Value Ref Range    Influenza A PCR Negative Negative    Influenza B PCR Negative Negative    RSV PCR Negative Negative    SARS CoV2 PCR Negative Negative   CBC with platelets and differential   Result Value Ref Range    WBC Count 21.2 (H) 4.0 - 11.0 10e3/uL    RBC Count 3.90 3.80 - 5.20 10e6/uL    Hemoglobin 10.2 (L) 11.7 - 15.7 g/dL    Hematocrit 33.4 (L) 35.0 - 47.0 %    MCV 86 78 - 100 fL    MCH 26.2 (L) 26.5 - 33.0 pg    MCHC 30.5 (L) 31.5 - 36.5 g/dL    RDW 14.8 10.0 - 15.0 %    Platelet Count 372 150 - 450 10e3/uL    % Neutrophils 93 %    % Lymphocytes 2 %    % Monocytes 4 %    % Eosinophils 0 %    % Basophils 0 %    % Immature Granulocytes 1 %    NRBCs per 100 WBC 0 <1 /100    Absolute Neutrophils 19.7 (H) 1.6 - 8.3 10e3/uL    Absolute Lymphocytes 0.5 (L) 0.8 - 5.3 10e3/uL    Absolute Monocytes 0.9 0.0 - 1.3 10e3/uL    Absolute Eosinophils 0.0 0.0 - 0.7 10e3/uL    Absolute Basophils 0.0 0.0 - 0.2 10e3/uL    Absolute Immature Granulocytes 0.1 <=0.4 10e3/uL    Absolute NRBCs 0.0 10e3/uL   ECG 12-LEAD WITH MUSE (LHE)   Result Value Ref Range    Systolic Blood Pressure 149 mmHg    Diastolic Blood Pressure 63 mmHg    Ventricular Rate 103 BPM    Atrial Rate 103 BPM    OR Interval 140 ms    QRS Duration 84 ms     ms    QTc 419 ms    P Axis 62 degrees    R AXIS -14 degrees    T Axis 76 degrees    Interpretation ECG       Sinus tachycardia  Nonspecific ST and T wave abnormality  Abnormal ECG  When compared with ECG of 04-Apr-2022 07:03,  No  significant change was found  Confirmed by SEE ED PROVIDER NOTE FOR, ECG INTERPRETATION (4000),  Emily Shen ([id number]) on 9/4/2024 10:32:20 AM         RADIOLOGY:  Reviewed all pertinent imaging. Please see official radiology report.  XR Chest 2 Views   Final Result   IMPRESSION: Shallow inspiration exaggerates heart size and pulmonary vascularity which are within normal limits. Mild consolidation and/or volume loss in the posterior basilar right lower lobe. Lungs otherwise clear. No visible pleural fluid. Mild    segmental elevation right hemidiaphragm unchanged. Moderate size esophageal hiatal hernia unchanged.          EKG:    Performed at: 1027    Impression: EKG shows a sinus tachycardia 103 beats a minute, normal axis, normal UT, QRS and QTc durations, no ST elevations, nonspecific T wave abnormality in aVL which is stable compared to prior EKG.        I have independently reviewed and interpreted the EKG(s) documented above.        I, Aida Sky, am serving as a scribe to document services personally performed by Salvatore Malave MD based on my observation and the provider's statements to me. I, Salvatore Malave MD, attest that Aida Sky is acting in a scribe capacity, has observed my performance of the services and has documented them in accordance with my direction.    Salvatore Malave MD  [facility] EMERGENCY ROOM  [address] [id number] [phone]      Salvatore Malave MD  09/04/24 4034

## 2024-09-04 NOTE — ED TRIAGE NOTES
Pt brought by EMS from home with c.o dizziness, weakness and dehydration, dry mouth per EMS this has been going on for 2 days but getting worse. Pt has chronic right leg wound. Pt febrile on arrival. Pt reports cough for past  few days, was given 4mg ODT zofran by EMS. .  Tachycardic, hypoxic, and febrile on arrival MD notified. Primary RN placing pt on O2.

## 2024-09-04 NOTE — PHARMACY-VANCOMYCIN DOSING SERVICE
Pharmacy Vancomycin Initial Note  Date of Service 2024  Patient's  1945  79 year old, female    Indication: Sepsis and Skin and Soft Tissue Infection    Current estimated CrCl = Estimated Creatinine Clearance: 64.6 mL/min (based on SCr of 0.85 mg/dL).    Creatinine for last 3 days  No results found for requested labs within last 3 days.    Recent Vancomycin Level(s) for last 3 days  No results found for requested labs within last 3 days.      Vancomycin IV Administrations (past 72 hours)        No vancomycin orders with administrations in past 72 hours.                    Nephrotoxins and other renal medications (From now, onward)      Start     Dose/Rate Route Frequency Ordered Stop    24 1130  piperacillin-tazobactam (ZOSYN) 3.375 g vial to attach to  mL bag         3.375 g  over 30 Minutes Intravenous ONCE 24 1100      24 1130  vancomycin (VANCOCIN) 1,750 mg in 0.9% NaCl 500 mL intermittent infusion         1,750 mg  250 mL/hr over 2 Hours Intravenous ONCE 24 1117              Contrast Orders - past 72 hours (72h ago, onward)      None                    Plan:  Vancomycin 1750mg (~ 20mg/kg abw) iv x1 now  Please re-consult pharmacy if vancomycin is to be continued beyond this initial loading dose.    Akhil Triplett [facility]

## 2024-09-04 NOTE — ED PROVIDER NOTES
"ED SIGNOUT  Date/Time:9/4/2024 3:43 PM    ED Course/MDM:    3:45 PM  Signout accepted from Dr. Malave.  Prior records were reviewed.  Labs, x-ray, CT, EKG from this visit are reviewed.    8:11 PM patient remained vitally stable in the department and was transferred        DIAGNOSIS  1. Cellulitis of right lower extremity    2. Sepsis, due to unspecified organism, unspecified whether acute organ dysfunction present (H)        Current Discharge Medication List          HPI    Patient endorses a history of lymphedema of bilateral lower extremities and chronic wound on her right lower extremity for 4 years. She has a wound care nurse that comes to her house every Monday, Thursday, and Saturday. Recently had acute cellulitis in July 2024 and was placed on a 5 day course of doxycycline which she did complete. Hasn't been on antibiotics since then.    Physical Exam:  /59   Pulse 73   Temp 100.1  F (37.8  C) (Oral)   Resp 18   Ht 1.549 m (5' 1\")   Wt 112.9 kg (249 lb)   SpO2 98%   BMI 47.05 kg/m    Physical Exam  Vitals and nursing note reviewed.   Constitutional:       Appearance: Normal appearance.   HENT:      Head: Normocephalic and atraumatic.      Right Ear: External ear normal.      Left Ear: External ear normal.      Nose: Nose normal.   Eyes:      Extraocular Movements: Extraocular movements intact.      Conjunctiva/sclera: Conjunctivae normal.   Pulmonary:      Effort: Pulmonary effort is normal.   Musculoskeletal:         General: Normal range of motion.      Cervical back: Normal range of motion.      Right lower leg: No edema.      Left lower leg: No edema.   Skin:     General: Skin is warm and dry.   Neurological:      General: No focal deficit present.      Mental Status: She is alert and oriented to person, place, and time. Mental status is at baseline.   Psychiatric:         Mood and Affect: Mood normal.         Behavior: Behavior normal.         Thought Content: Thought content normal.        "   Results for orders placed or performed during the hospital encounter of 09/04/24   XR Chest 2 Views    Impression    IMPRESSION: Shallow inspiration exaggerates heart size and pulmonary vascularity which are within normal limits. Mild consolidation and/or volume loss in the posterior basilar right lower lobe. Lungs otherwise clear. No visible pleural fluid. Mild   segmental elevation right hemidiaphragm unchanged. Moderate size esophageal hiatal hernia unchanged.   Comprehensive metabolic panel   Result Value Ref Range    Sodium 141 135 - 145 mmol/L    Potassium 4.0 3.4 - 5.3 mmol/L    Carbon Dioxide (CO2) 23 22 - 29 mmol/L    Anion Gap 16 (H) 7 - 15 mmol/L    Urea Nitrogen 19.7 8.0 - 23.0 mg/dL    Creatinine 1.04 (H) 0.51 - 0.95 mg/dL    GFR Estimate 55 (L) >60 mL/min/1.73m2    Calcium 8.7 (L) 8.8 - 10.4 mg/dL    Chloride 102 98 - 107 mmol/L    Glucose 93 70 - 99 mg/dL    Alkaline Phosphatase 220 (H) 40 - 150 U/L    AST 67 (H) 0 - 45 U/L    ALT 48 0 - 50 U/L    Protein Total 7.1 6.4 - 8.3 g/dL    Albumin 3.2 (L) 3.5 - 5.2 g/dL    Bilirubin Total 0.4 <=1.2 mg/dL   Lactic Acid Whole Blood with 1X Repeat in 2 HR when >2   Result Value Ref Range    Lactic Acid, Initial 1.3 0.7 - 2.0 mmol/L   Blood gas venous   Result Value Ref Range    pH Venous 7.38 7.32 - 7.43    pCO2 Venous 51 (H) 40 - 50 mm Hg    pO2 Venous 22 (L) 25 - 47 mm Hg    Bicarbonate Venous 30 (H) 21 - 28 mmol/L    Base Excess/Deficit Venous 5.1 (H) -3.0 - 3.0 mmol/L    FIO2 21     Oxyhemoglobin Venous 32 (L) 70 - 75 %    O2 Sat, Venous 32.9 (L) 70.0 - 75.0 %   Symptomatic Influenza A/B, RSV, & SARS-CoV2 PCR (COVID-19) Nasopharyngeal    Specimen: Nasopharyngeal; Swab   Result Value Ref Range    Influenza A PCR Negative Negative    Influenza B PCR Negative Negative    RSV PCR Negative Negative    SARS CoV2 PCR Negative Negative   CBC with platelets and differential   Result Value Ref Range    WBC Count 21.2 (H) 4.0 - 11.0 10e3/uL    RBC Count 3.90 3.80 -  5.20 10e6/uL    Hemoglobin 10.2 (L) 11.7 - 15.7 g/dL    Hematocrit 33.4 (L) 35.0 - 47.0 %    MCV 86 78 - 100 fL    MCH 26.2 (L) 26.5 - 33.0 pg    MCHC 30.5 (L) 31.5 - 36.5 g/dL    RDW 14.8 10.0 - 15.0 %    Platelet Count 372 150 - 450 10e3/uL    % Neutrophils 93 %    % Lymphocytes 2 %    % Monocytes 4 %    % Eosinophils 0 %    % Basophils 0 %    % Immature Granulocytes 1 %    NRBCs per 100 WBC 0 <1 /100    Absolute Neutrophils 19.7 (H) 1.6 - 8.3 10e3/uL    Absolute Lymphocytes 0.5 (L) 0.8 - 5.3 10e3/uL    Absolute Monocytes 0.9 0.0 - 1.3 10e3/uL    Absolute Eosinophils 0.0 0.0 - 0.7 10e3/uL    Absolute Basophils 0.0 0.0 - 0.2 10e3/uL    Absolute Immature Granulocytes 0.1 <=0.4 10e3/uL    Absolute NRBCs 0.0 10e3/uL   ECG 12-LEAD WITH MUSE (LHE)   Result Value Ref Range    Systolic Blood Pressure 149 mmHg    Diastolic Blood Pressure 63 mmHg    Ventricular Rate 103 BPM    Atrial Rate 103 BPM    NM Interval 140 ms    QRS Duration 84 ms     ms    QTc 419 ms    P Axis 62 degrees    R AXIS -14 degrees    T Axis 76 degrees    Interpretation ECG       Sinus tachycardia  Nonspecific ST and T wave abnormality  Abnormal ECG  When compared with ECG of 04-Apr-2022 07:03,  No significant change was found  Confirmed by SEE ED PROVIDER NOTE FOR, ECG INTERPRETATION ([id number]),  Emily Shen ([id number]) on 9/4/2024 10:32:20 AM         XR Chest 2 Views    Result Date: 9/4/2024  EXAM: XR CHEST 2 VIEWS LOCATION: [facility] DATE: 9/4/2024 INDICATION: hypoxia, eval for pna COMPARISON: 7/19/2024 and 7/16/2024 CXRs and prior CT chest 4/3/2022     IMPRESSION: Shallow inspiration exaggerates heart size and pulmonary vascularity which are within normal limits. Mild consolidation and/or volume loss in the posterior basilar right lower lobe. Lungs otherwise clear. No visible pleural fluid. Mild segmental elevation right hemidiaphragm unchanged. Moderate size esophageal hiatal hernia unchanged.      Ramo Fuentes,  M.D.  [facility] Emergency Department         Ramo Fuentes MD  09/04/24 2011

## 2024-09-04 NOTE — PHARMACY-ADMISSION MEDICATION HISTORY
Pharmacy Intern Admission Medication History    Admission medication history is complete. The information provided in this note is only as accurate as the sources available at the time of the update.    Information Source(s): Patient via in-person    Pertinent Information: Of note, per 7/23 discharge note - potassium noted to be discontinued, however, patient reports taking 1 tablet at night. Last fill date was 6/28 for 180 tablets.     Changes made to PTA medication list:  Added: ropinirole afternoon prn, potassium, and acetaminophen.  Deleted: benzonatate and oxycodone.  Changed:   Vitamin C from BID to QPM.  Kaylie-C from BID to QPM.    Allergies reviewed with patient and updates made in EHR: yes    Medication History Completed By: Michelle Milan 9/4/2024 12:16 PM    Current Facility-Administered Medications for the 9/4/24 encounter (Hospital Encounter)   Medication    oxyCODONE (ROXICODONE) tablet 5 mg     PTA Med List   Medication Sig Last Dose    acetaminophen (TYLENOL) 500 MG tablet Take 500 mg by mouth 2 times daily. 500 mg in the morning and 500 mg at noon 9/3/2024    acetaminophen (TYLENOL) 500 MG tablet Take 1,000 mg by mouth every evening. 9/3/2024    ascorbic acid (VITAMIN C) 250 MG CHEW chewable tablet Take 250 mg by mouth daily. 9/3/2024 at night    aspirin (ASPIRIN 81) 81 MG chewable tablet Take 1 tablet by mouth every morning 9/3/2024 at morning    Bioflavonoid Products (KAYLIE-C/BIOFLAVONOIDS PO) Take 1 tablet by mouth every evening.     buPROPion (WELLBUTRIN SR) 150 MG 12 hr tablet Take 150 mg by mouth every morning  9/3/2024 at morning    citalopram (CELEXA) 40 MG tablet Take 40 mg by mouth daily (with lunch) 9/3/2024 at lunch    ferrous sulfate (FEROSUL) 325 (65 Fe) MG tablet Take 325 mg by mouth three times a week 9/3/2024    furosemide (LASIX) 20 MG tablet Take 20 mg by mouth 2 times daily 9/3/2024    gabapentin (NEURONTIN) 100 MG capsule Take 100 mg by mouth 3 times daily 9/3/2024     magnesium oxide (MAG-OX) 400 MG tablet Take 1 tablet by mouth every morning 9/3/2024 at morning    multivitamin w/minerals (CENTRUM ADULTS) tablet Take 1 tablet by mouth every morning 9/3/2024 at morning    nitroGLYcerin (NITROSTAT) 0.4 MG sublingual tablet PLACE 1 TABLET UNDER TONGUE EVERY 5 MINTUES AS NEEDED FOR CHEST PAIN FOR UP TO 30 DAYS prn    pantoprazole (PROTONIX) 40 MG EC tablet Take 40 mg by mouth every morning 9/3/2024 at morning    potassium chloride ER (K-TAB) 20 MEQ CR tablet Take 20 mEq by mouth every evening. 9/3/2024    rOPINIRole (REQUIP) 1 MG tablet Take 0.5-1 mg by mouth daily as needed. At noon 9/3/2024    rOPINIRole (REQUIP) 1 MG tablet Take 0.5 mg by mouth every morning In addition, patient takes one tablet (1 mg) in the evening 9/3/2024    rOPINIRole (REQUIP) 1 MG tablet Take 1 mg by mouth every evening In addition, patient takes one half tablet (0.5 mg) in the morning 9/3/2024    simvastatin (ZOCOR) 40 MG tablet [SIMVASTATIN (ZOCOR) 40 MG TABLET] Take 40 mg by mouth bedtime. 9/3/2024 at night    spironolactone (ALDACTONE) 25 MG tablet Take 25 mg by mouth every morning 9/3/2024    vitamin B-Complex Take 1 tablet by mouth daily 9/3/2024    vitamin D3 (CHOLECALCIFEROL) 250 mcg (40365 units) capsule Take 1 capsule (250 mcg) by mouth daily 9/3/2024    Wound Cleansers (VASHE WOUND THERAPY EX) Externally apply topically daily as needed Unknown    zinc 50 MG TABS Take 1 tablet by mouth every morning 9/3/2024     Thank you,  Michelle Milan, Student Pharmacist  [facility]

## 2024-09-05 ENCOUNTER — APPOINTMENT (OUTPATIENT)
Dept: OCCUPATIONAL THERAPY | Facility: HOSPITAL | Age: 79
DRG: 872 | End: 2024-09-05
Attending: INTERNAL MEDICINE
Payer: COMMERCIAL

## 2024-09-05 ENCOUNTER — APPOINTMENT (OUTPATIENT)
Dept: PHYSICAL THERAPY | Facility: HOSPITAL | Age: 79
DRG: 872 | End: 2024-09-05
Attending: INTERNAL MEDICINE
Payer: COMMERCIAL

## 2024-09-05 ENCOUNTER — APPOINTMENT (OUTPATIENT)
Dept: ULTRASOUND IMAGING | Facility: HOSPITAL | Age: 79
DRG: 872 | End: 2024-09-05
Attending: INTERNAL MEDICINE
Payer: COMMERCIAL

## 2024-09-05 LAB
ANION GAP SERPL CALCULATED.3IONS-SCNC: 8 MMOL/L (ref 7–15)
BUN SERPL-MCNC: 29.3 MG/DL (ref 8–23)
CALCIUM SERPL-MCNC: 8.4 MG/DL (ref 8.8–10.4)
CHLORIDE SERPL-SCNC: 102 MMOL/L (ref 98–107)
CREAT SERPL-MCNC: 1.21 MG/DL (ref 0.51–0.95)
EGFRCR SERPLBLD CKD-EPI 2021: 46 ML/MIN/1.73M2
ERYTHROCYTE [DISTWIDTH] IN BLOOD BY AUTOMATED COUNT: 14.8 % (ref 10–15)
GLUCOSE SERPL-MCNC: 108 MG/DL (ref 70–99)
HCO3 SERPL-SCNC: 27 MMOL/L (ref 22–29)
HCT VFR BLD AUTO: 28.2 % (ref 35–47)
HGB BLD-MCNC: 8.5 G/DL (ref 11.7–15.7)
MAGNESIUM SERPL-MCNC: 1.7 MG/DL (ref 1.7–2.3)
MCH RBC QN AUTO: 26.4 PG (ref 26.5–33)
MCHC RBC AUTO-ENTMCNC: 30.1 G/DL (ref 31.5–36.5)
MCV RBC AUTO: 88 FL (ref 78–100)
PLATELET # BLD AUTO: 295 10E3/UL (ref 150–450)
POTASSIUM SERPL-SCNC: 4.1 MMOL/L (ref 3.4–5.3)
RBC # BLD AUTO: 3.22 10E6/UL (ref 3.8–5.2)
SODIUM SERPL-SCNC: 137 MMOL/L (ref 135–145)
WBC # BLD AUTO: 14.8 10E3/UL (ref 4–11)

## 2024-09-05 PROCEDURE — 120N000001 HC R&B MED SURG/OB

## 2024-09-05 PROCEDURE — 97165 OT EVAL LOW COMPLEX 30 MIN: CPT | Mod: GO

## 2024-09-05 PROCEDURE — 99222 1ST HOSP IP/OBS MODERATE 55: CPT | Performed by: INTERNAL MEDICINE

## 2024-09-05 PROCEDURE — 85027 COMPLETE CBC AUTOMATED: CPT | Performed by: INTERNAL MEDICINE

## 2024-09-05 PROCEDURE — G0463 HOSPITAL OUTPT CLINIC VISIT: HCPCS | Mod: 25

## 2024-09-05 PROCEDURE — 250N000011 HC RX IP 250 OP 636: Performed by: INTERNAL MEDICINE

## 2024-09-05 PROCEDURE — 80048 BASIC METABOLIC PNL TOTAL CA: CPT | Performed by: INTERNAL MEDICINE

## 2024-09-05 PROCEDURE — 99233 SBSQ HOSP IP/OBS HIGH 50: CPT | Performed by: INTERNAL MEDICINE

## 2024-09-05 PROCEDURE — 97535 SELF CARE MNGMENT TRAINING: CPT | Mod: GO

## 2024-09-05 PROCEDURE — 250N000013 HC RX MED GY IP 250 OP 250 PS 637: Performed by: INTERNAL MEDICINE

## 2024-09-05 PROCEDURE — 97161 PT EVAL LOW COMPLEX 20 MIN: CPT | Mod: GP

## 2024-09-05 PROCEDURE — 87205 SMEAR GRAM STAIN: CPT | Performed by: INTERNAL MEDICINE

## 2024-09-05 PROCEDURE — 36415 COLL VENOUS BLD VENIPUNCTURE: CPT | Performed by: INTERNAL MEDICINE

## 2024-09-05 PROCEDURE — 258N000003 HC RX IP 258 OP 636: Performed by: INTERNAL MEDICINE

## 2024-09-05 PROCEDURE — 83735 ASSAY OF MAGNESIUM: CPT | Performed by: INTERNAL MEDICINE

## 2024-09-05 PROCEDURE — 93971 EXTREMITY STUDY: CPT | Mod: RT

## 2024-09-05 RX ORDER — MINERAL OIL/HYDROPHIL PETROLAT
OINTMENT (GRAM) TOPICAL DAILY
Status: DISCONTINUED | OUTPATIENT
Start: 2024-09-05 | End: 2024-09-11 | Stop reason: HOSPADM

## 2024-09-05 RX ORDER — SODIUM CHLORIDE 9 MG/ML
INJECTION, SOLUTION INTRAVENOUS CONTINUOUS
Status: DISCONTINUED | OUTPATIENT
Start: 2024-09-05 | End: 2024-09-06

## 2024-09-05 RX ADMIN — PIPERACILLIN AND TAZOBACTAM 3.38 G: 3; .375 INJECTION, POWDER, FOR SOLUTION INTRAVENOUS at 17:06

## 2024-09-05 RX ADMIN — PIPERACILLIN AND TAZOBACTAM 3.38 G: 3; .375 INJECTION, POWDER, FOR SOLUTION INTRAVENOUS at 09:26

## 2024-09-05 RX ADMIN — MAGNESIUM OXIDE TAB 400 MG (241.3 MG ELEMENTAL MG) 400 MG: 400 (241.3 MG) TAB at 09:27

## 2024-09-05 RX ADMIN — ASPIRIN 81 MG CHEWABLE TABLET 81 MG: 81 TABLET CHEWABLE at 09:27

## 2024-09-05 RX ADMIN — BUPROPION HYDROCHLORIDE 150 MG: 150 TABLET, FILM COATED, EXTENDED RELEASE ORAL at 09:28

## 2024-09-05 RX ADMIN — SODIUM CHLORIDE: 9 INJECTION, SOLUTION INTRAVENOUS at 09:48

## 2024-09-05 RX ADMIN — HEPARIN SODIUM 5000 UNITS: 10000 INJECTION, SOLUTION INTRAVENOUS; SUBCUTANEOUS at 22:03

## 2024-09-05 RX ADMIN — HEPARIN SODIUM 5000 UNITS: 10000 INJECTION, SOLUTION INTRAVENOUS; SUBCUTANEOUS at 15:04

## 2024-09-05 RX ADMIN — HYDROMORPHONE HYDROCHLORIDE 0.4 MG: 0.2 INJECTION, SOLUTION INTRAMUSCULAR; INTRAVENOUS; SUBCUTANEOUS at 19:28

## 2024-09-05 RX ADMIN — ROPINIROLE HYDROCHLORIDE 1 MG: 1 TABLET, FILM COATED ORAL at 20:01

## 2024-09-05 RX ADMIN — HEPARIN SODIUM 5000 UNITS: 10000 INJECTION, SOLUTION INTRAVENOUS; SUBCUTANEOUS at 05:43

## 2024-09-05 RX ADMIN — SIMVASTATIN 40 MG: 10 TABLET, FILM COATED ORAL at 20:01

## 2024-09-05 RX ADMIN — HYDROMORPHONE HYDROCHLORIDE 0.4 MG: 0.2 INJECTION, SOLUTION INTRAMUSCULAR; INTRAVENOUS; SUBCUTANEOUS at 09:30

## 2024-09-05 RX ADMIN — PANTOPRAZOLE SODIUM 40 MG: 40 TABLET, DELAYED RELEASE ORAL at 09:27

## 2024-09-05 RX ADMIN — CALCIUM CARBONATE (ANTACID) CHEW TAB 500 MG 1000 MG: 500 CHEW TAB at 19:55

## 2024-09-05 RX ADMIN — ACETAMINOPHEN 650 MG: 325 TABLET ORAL at 05:50

## 2024-09-05 RX ADMIN — VANCOMYCIN HYDROCHLORIDE 1000 MG: 1 INJECTION, SOLUTION INTRAVENOUS at 09:26

## 2024-09-05 RX ADMIN — GABAPENTIN 100 MG: 100 CAPSULE ORAL at 09:27

## 2024-09-05 RX ADMIN — HYDROMORPHONE HYDROCHLORIDE 0.4 MG: 0.2 INJECTION, SOLUTION INTRAMUSCULAR; INTRAVENOUS; SUBCUTANEOUS at 17:05

## 2024-09-05 RX ADMIN — POTASSIUM CHLORIDE 20 MEQ: 1500 TABLET, EXTENDED RELEASE ORAL at 20:01

## 2024-09-05 RX ADMIN — CITALOPRAM HYDROBROMIDE 40 MG: 20 TABLET ORAL at 15:04

## 2024-09-05 RX ADMIN — ROPINIROLE HYDROCHLORIDE 0.5 MG: 0.25 TABLET, FILM COATED ORAL at 09:29

## 2024-09-05 RX ADMIN — GABAPENTIN 100 MG: 100 CAPSULE ORAL at 20:01

## 2024-09-05 RX ADMIN — GABAPENTIN 100 MG: 100 CAPSULE ORAL at 15:04

## 2024-09-05 ASSESSMENT — ACTIVITIES OF DAILY LIVING (ADL)
ADLS_ACUITY_SCORE: 45
ADLS_ACUITY_SCORE: 47
ADLS_ACUITY_SCORE: 47
ADLS_ACUITY_SCORE: 40
ADLS_ACUITY_SCORE: 40
ADLS_ACUITY_SCORE: 47
DEPENDENT_IADLS:: INDEPENDENT
ADLS_ACUITY_SCORE: 40
ADLS_ACUITY_SCORE: 47
ADLS_ACUITY_SCORE: 40
ADLS_ACUITY_SCORE: 40
ADLS_ACUITY_SCORE: 47
ADLS_ACUITY_SCORE: 40
ADLS_ACUITY_SCORE: 40
ADLS_ACUITY_SCORE: 47

## 2024-09-05 NOTE — PLAN OF CARE
Problem: Adult Inpatient Plan of Care  Goal: Absence of Hospital-Acquired Illness or Injury  Outcome: Progressing     Problem: Adult Inpatient Plan of Care  Goal: Optimal Comfort and Wellbeing  Outcome: Progressing     Problem: Risk for Delirium  Goal: Optimal Coping  Outcome: Progressing  Goal: Improved Behavioral Control  Outcome: Progressing  Goal: Improved Attention and Thought Clarity  Outcome: Progressing  Goal: Improved Sleep  Outcome: Progressing     Problem: Comorbidity Management  Goal: Blood Pressure in Desired Range  Outcome: Progressing     Problem: Sepsis/Septic Shock  Goal: Optimal Coping  Outcome: Progressing  Goal: Absence of Bleeding  Outcome: Progressing  Goal: Blood Glucose Level Within Targeted Range  Outcome: Progressing  Goal: Absence of Infection Signs and Symptoms  Outcome: Progressing  Goal: Optimal Nutrition Intake  Outcome: Progressing     Problem: Skin or Soft Tissue Infection  Goal: Absence of Infection Signs and Symptoms  Outcome: Not Progressing     Problem: Wound  Goal: Optimal Coping  Outcome: Progressing  Goal: Optimal Functional Ability  Outcome: Not Progressing  Goal: Absence of Infection Signs and Symptoms  Outcome: Not Progressing  Goal: Improved Oral Intake  Outcome: Progressing  Goal: Optimal Pain Control and Function  Outcome: Progressing  Goal: Skin Health and Integrity  Outcome: Not Progressing  Goal: Optimal Wound Healing  Outcome: Not Progressing     Problem: Fatigue  Goal: Improved Activity Tolerance  Outcome: Not Progressing     Problem: Activity Intolerance  Goal: Enhanced Capacity and Energy  Outcome: Not Progressing     Problem: Anemia  Goal: Anemia Symptom Improvement  Outcome: Progressing     Problem: Depression  Goal: Improved Mood  Outcome: Progressing    Patient was a direct admission from [facility]. Dx: Sepsis due to RLE cellulitis/chronic wound. Patient was alert and oriented. Patient was an assist of 2 with pivot transfer from the stretcher to the  bed. Patient is independent with bed mobility. Patient complained of RLE pain. PRN IV hydromorphone administered. Scheduled ropinirole and gabapentin also given at HS. New PIV placed by SWAT RN to left forearm due to previous site infiltrated. Ice pack placed to right AC, which decreased swelling and redness. ACE wrap changed this shift to RLE dressing due to saturation with serous drainage. RN was able to see partial of the wound surface, which was reddened and moist. IV Zosyn administered to new PIV. Patient has ID, WOC, OT/PT, and lymphedema consults pending.

## 2024-09-05 NOTE — PHARMACY-VANCOMYCIN DOSING SERVICE
"Pharmacy Vancomycin Initial Note  Date of Service 2024  Patient's  1945  79 year old, female    Indication: Skin and Soft Tissue Infection    Current estimated CrCl = Estimated Creatinine Clearance: 52.8 mL/min (A) (based on SCr of 1.04 mg/dL (H)).    Creatinine for last 3 days  2024: 10:47 AM Creatinine 1.04 mg/dL    Recent Vancomycin Level(s) for last 3 days  No results found for requested labs within last 3 days.      Vancomycin IV Administrations (past 72 hours)                     vancomycin (VANCOCIN) 1,750 mg in 0.9% NaCl 500 mL intermittent infusion (mg) 1,750 mg New Bag 24 1247                    Nephrotoxins and other renal medications (From now, onward)      Start     Dose/Rate Route Frequency Ordered Stop    24 213  piperacillin-tazobactam (ZOSYN) 3.375 g vial to attach to  mL bag        Note to Pharmacy: For [facility], [facility] and [facility]: For Zosyn-naive patients, use the \"Zosyn initial dose + extended infusion\" order panel.    3.375 g  over 240 Minutes Intravenous EVERY 8 HOURS 24 2106              Contrast Orders - past 72 hours (72h ago, onward)      None            [employer] Prediction of Planned Initial Vancomycin Regimen  Loading dose: 1750mg  Regimen: 1000 mg IV every 24 hours.  Start time: 12:47 on 2024  Exposure target: AUC24 (range)400-600 mg/L.hr   AUC24,ss: 490 mg/L.hr  Probability of AUC24 > 400: 66 %  Ctrough,ss: 13.8 mg/L  Probability of Ctrough,ss > 20: 30 %  Probability of nephrotoxicity (Lodise LOUIS ): 9 %          Plan:  Start vancomycin  1750mg x1 given on [place] then 1000 mg IV q24h.   Vancomycin monitoring method: AUC  Vancomycin therapeutic monitoring goal: 400-600 mg*h/L  Pharmacy will check vancomycin levels as appropriate in 1-3 Days.    Serum creatinine levels will be ordered daily for the first week of therapy and at least twice weekly for subsequent weeks.      Quan Manzo, [facility]    "

## 2024-09-05 NOTE — CONSULTS
[facility]  General ID Service Consult      Patient: Elizabeth Kelley  YOB: 1945, MRN: [id number]  Date of Admission:  9/4/2024  Date of Consult: 09/05/2024  Consult Requested by: Ketty De Santiago MD  Admission Diagnosis: Cellulitis  Pain associated with wound  Consult Question:     ID Assessment & Plan   right lower extremity recurrent cellulitis  Prior one was in July   risk factors obesity, lymphedema with open wounds,  Venous insufficiency      PLAN  Obtain swab cultures  Continue vancomycin and Zosyn for now  Leg elevation  Wound care  Not a candidate yet for suppression      Blayne Parham MD  [facility]  ______________________________________________________________________        History of Present Illness   79 year old female with history of hronic wound on her right lower extremity, lymphedema of bilateral lower extremities, venous insufficiency , GERD, HTN, HLD, MS, restless legs, morbid obesity and mood disorder who presents to ED for evaluation of fever and generalized weakness fever chilliness  Fever 102 in ED, where she had she had significant erythema weeping edema of the right lower extremity up to the knee    Her most recent bout of cellulitis otherwise was in July when she was hospitalized and discharged on doxycycline  Prior to that there was a bout of cellulitis about 2 years ago.  She does get regular wound care at home with compression Prior one was in July    US no DVT     Cxr   Shallow inspiration exaggerates heart size and pulmonary vascularity which are within normal limits. Mild consolidation and/or volume loss in the posterior basilar right lower lobe. Lungs otherwise clear. No visible pleural fluid. Mild   segmental elevation right hemidiaphragm unchanged. Moderate size esophageal hiatal hernia unchanged.      Review of Systems   The 10 point Review of Systems is negative other than noted in the HPI or here.      Past Medical History    Past Medical History:   Diagnosis Date    Ankle contracture, left     Class 3 severe obesity due to excess calories without serious comorbidity with body mass index (BMI) of 45.0 to 49.9 in adult (H)     Combined gastric and duodenal ulcer     Controlled substance agreement broken     Depression     Dyslipidemia     Edema     Edema     Gait abnormality     GERD (gastroesophageal reflux disease)     Gout     Lymphedema of both lower extremities     Melanoma (H)     left upper arm    MS (multiple sclerosis) (H)     RLS (restless legs syndrome)     Skin ulcer of left lower leg (H)     Valgus deformity of both feet     Vitamin D deficiency        Past Surgical History   Past Surgical History:   Procedure Laterality Date    ABLATION SAPHENOUS VEIN W/ RFA Right 04/12/2021    Saphenous vein, anterior accessory saphenous vein, sclerotherapy of multiple veins.    COSMETIC SURGERY  1988    reduction of excess skin from weight loss    ESOPHAGOSCOPY, GASTROSCOPY, DUODENOSCOPY (EGD), COMBINED N/A 03/30/2015    Procedure: UPPER ENDOSCOPY with gastric biopsy;  Surgeon: Checo Fletcher MD;  Location: [facility];  Service:     IRRIGATION AND DEBRIDEMENT LOWER EXTREMITY, COMBINED Right 3/21/2022    Procedure: RIGHT LEG WOUND DEBRIDEMENT, PAULIE DERMATONE, MISONIX, VAC VERA FLOW WITH VASHE;  Surgeon: Gabriele Bullock MD;  Location:  OR    IRRIGATION AND DEBRIDEMENT LOWER EXTREMITY, COMBINED Right 3/28/2022    Procedure: DEBRIDEMENT AND WOUND DRESSING CHANGE AND MYRIAD APPLICATION OF RIGHT LOWER LEG WOUND;  Surgeon: Gabrilee Bullock MD;  Location:  OR    MAMMOPLASTY REDUCTION Bilateral     MOHS MICROGRAPHIC PROCEDURE      left upper arm, melanoma    PICC DOUBLE LUMEN PLACEMENT  7/21/2024    PICC SINGLE LUMEN PLACEMENT  8/13/2021         PICC SINGLE LUMEN PLACEMENT  9/2/2021         SPINE SURGERY      L5 area surgery, decompression       Social History   Social History     Tobacco Use    Smoking  status: Former     Current packs/day: 0.00     Average packs/day: 0.3 packs/day for 12.0 years (3.0 ttl pk-yrs)     Types: Cigarettes     Start date: 1963     Quit date: 1975     Years since quittin.7    Smokeless tobacco: Never    Tobacco comments:     quit more than 30 years ago   Substance Use Topics    Alcohol use: Yes     Alcohol/week: 1.0 standard drink of alcohol     Types: 1 Standard drinks or equivalent per week     Comment: glass of wine once a week    Drug use: No       Family History   I have reviewed this patient's family history and updated it with pertinent information if needed.  Family History   Problem Relation Age of Onset    Uterine Cancer Mother     Hyperlipidemia Mother     Hypertension Mother     Multiple Sclerosis Mother     Asthma Sister     Obesity Sister     Hyperlipidemia Sister     Hypertension Sister     Multiple Sclerosis Sister     Arthritis Sister     Colon Cancer Paternal Aunt     Breast Cancer Paternal Aunt     Hypertension Paternal Aunt     Hyperlipidemia Paternal Aunt     Brain Cancer Father     Arthritis Maternal Uncle     Arthritis Maternal Grandmother     Early Death Maternal Grandfather     Arthritis Paternal Grandmother     Arthritis Paternal Grandfather        Medications   I have reviewed this patient's current medications    Allergies   Allergies   Allergen Reactions    Other Environmental Allergy Anaphylaxis     Bees/Wasps Stings  Bees/Wasps Stings    Adhesive Tape Other (See Comments)     Red,itchy and oozes  Red,itchy and oozes    Adhesive [Cyanoacrylate] Unknown     Other reaction(s): sores    Latex Hives    Oxycodone-Acetaminophen Rash    Vicodin [Hydrocodone-Acetaminophen] Nausea and Vomiting and Hives       Physical Exam   Vital Signs: Temp: 99  F (37.2  C) Temp src: Oral BP: 116/57 Pulse: 74   Resp: 17 SpO2: 97 % O2 Device: Nasal cannula Oxygen Delivery: 1 LPM  Weight: 0 lbs 0 oz    Gen. appearance nontoxic  Eyes no conjunctivitis or icterus  Neck  no stiffness   Heartedema  Lungs no sob  Abdomen soft not tender  Extremities no synovitis,RLE massive edema/ly,phdema/  And erythema mostly below knee   Superificila wounds  Skin  no rash or emboli  Neurologic alert oriented no focal deficits        Data   Inflammatory Markers   Recent Labs   Lab Test 10/20/21  0835 10/11/21  1350 10/06/21  1525 09/28/21  1130 09/21/21  1615 09/14/21  1135 09/07/21  1000 08/31/21  1240   SED 55* 64* 71* 62* 60* 67* 58* 68*        Hematology Studies   Recent Labs   Lab Test 09/05/24  0558 09/04/24  1047 07/23/24  0628 07/22/24  1106 07/21/24  0755 07/20/24  0957 04/11/22  0908 04/09/22  0742   WBC 14.8* 21.2* 11.4* 9.1 8.5 8.7   < > 10.1   ANEU  --   --   --   --   --   --   --  6.5   AEOS  --   --   --   --   --   --   --  0.8*   HGB 8.5* 10.2* 9.7* 9.3* 9.2* 9.3*   < > 8.5*   MCV 88 86 86 85 86 85   < > 89    372 318 266 216 213   < > 443    < > = values in this interval not displayed.       Metabolic Studies   Recent Labs   Lab Test 09/05/24  0558 09/04/24  1047 07/23/24  0628 07/22/24  0533 07/21/24  0755    141 142 139 139   POTASSIUM 4.1 4.0 3.6 3.7 3.7   CHLORIDE 102 102 100 101 101   CO2 27 23 30* 30* 27   BUN 29.3* 19.7 13.0 11.3 11.2   CR 1.21* 1.04* 0.85 0.81 0.83   GFRESTIMATED 46* 55* 71 75 73       Hepatic Studies    Recent Labs   Lab Test 09/04/24  1047 07/23/24  0628 07/22/24  0533 07/21/24  0755 07/20/24  0957 07/17/24  0705   BILITOTAL 0.4 0.2 0.2 0.2 0.3 0.2   ALKPHOS 220* 541* 480* 438* 388* 135   ALBUMIN 3.2* 3.0* 2.8* 2.7* 2.9* 3.1*   AST 67* 53* 63* 49* 43 44   ALT 48 85* 84* 68* 62* 34       Most Recent 6 Bacteria Isolates From Any Culture (See EPIC Reports for Culture Details):No lab results found.    Urine Studies    Recent Labs   Lab Test 07/16/24  1404 04/09/22  1512 04/01/22  1411 03/23/22  1700   LEUKEST Negative Moderate* Small* Trace*   WBCU <1 13* 2 7*       Vancomycin Levels    Recent Labs   Lab Test 07/23/24  0628 07/20/24  0957  "10/20/21  0835   VANCOMYCIN 19.1 8.9 18.5       Hepatitis B Testing No lab results found.  Hepatitis C Testing   No results found for: \"HCVAB\", \"HQTG\", \"HCGENO\", \"HCPCR\", \"HQTRNA\", \"HEPRNA\"  HIVTesting No lab results found.    Respiratory Virus Testing    No results found for: \"RS\", \"FLUAG\"  COVID-19 Antibody Results, Testing for Immunity           No data to display              COVID-19 PCR Results          7/16/2024    13:44 9/4/2024    10:49   COVID-19 PCR Results   SARS CoV2 PCR Negative  Negative        "

## 2024-09-05 NOTE — PROGRESS NOTES
[facility]    Medicine Progress Note - Hospitalist Service    Date of Admission:  9/4/2024    Assessment & Plan   Principal Problem:    Cellulitis  Active Problems:    Venous hypertension of lower extremity, bilateral    Acquired lymphedema of leg    Morbid obesity with BMI of 50.0-59.9, adult (H)    MS (multiple sclerosis) (H)    GERD (gastroesophageal reflux disease)    Essential hypertension    Major depression, single episode, in complete remission (H24)    Peripheral venous insufficiency    Pain associated with wound    Sepsis (H)        79 year old female with history of hronic wound on her right lower extremity, lymphedema of bilateral lower extremities, GERD, HTN, HLD, MS, restless legs, morbid obesity and mood disorder who presents 9/4/24 for evaluation of fever and generalized weakness and found to have sepsis related to RLE cellulitis. Lower extremity venous ultrasound was negative for deep venous thrombosis.     Sepsis  Cellulitis of right lower extremity  Chronic RLE ulcers   -- Continue IV vanco and zosyn  -- dilaudid prn pain  -- hold further IVF given chronic lymphedema and stable hemodynamics  -- hold home lasix and spironolactone for now  -- follow up blood cultures  --Follow-up with wound swab  -- Wound care per wound care nurse  --ID [id number] assistance       Mild hypoxia on admission  Patient is asymptomatic  Suspect poor probe sensing since Sp02 raised to 100% during my interview.   CXR on admission shows shallow inspiration, normal vascularity, and likely volume loss RLL with elevation of right hemidiaphargm  -- push IS  -- wean oxygen as tolerated       KIKE  Creatinine 1.21 compared to baseline of 0.8; creatinine is worsening  -- Continue to hold home spirolactone and lasix  -- Start normal saline at 75 mL/h  --Monitor BMP  --Avoid nephrotoxins  -Consider nephrology evaluation if renal function is worsening      Chronic hypokalemia and hypomagnesemia:   --  "continue home replacements  -- trend K and Mag in AM       Generalized Weakness, likely related to above  H/O MS  -- PT/OT        Anemia, chronic  HGB today 10.2, base line HGB approximately 8.5-11.1  -- continue home ferrous sulfate       Lymphedema of bilateral lower extremities  Neuropathy   Restless leg syndrome  -- continue home gabapentin and ropinirole  -- lymphedema therapy consult       H/O Hypertension  -- holding home lasix and spirolactone for KIKE as above      Hyperlipidemia  -- continue home simvastatin       Depression  -- continue home Buprion and citalopram       GERD: continue home PPI         DVT Prophylaxis: Heparin SQ  Amato Catheter: Not present  Lines: PRESENT             Cardiac Monitoring: None  Code Status: Full Code            Diet: Regular Diet Adult    DVT Prophylaxis: Heparin SQ  Amato Catheter: Not present  Lines: None     Cardiac Monitoring: None  Code Status: Full Code      Clinically Significant Risk Factors Present on Admission          # Hypocalcemia: Lowest Ca = 8.4 mg/dL in last 2 days, will monitor and replace as appropriate     # Hypoalbuminemia: Lowest albumin = 3.2 g/dL at 9/4/2024 10:47 AM, will monitor as appropriate   # Drug Induced Platelet Defect: home medication list includes an antiplatelet medication   # Hypertension: Noted on problem list    # Anemia: based on hgb <11       # Severe Obesity: Estimated body mass index is 47.05 kg/m  as calculated from the following:    Height as of an earlier encounter on 9/4/24: 1.549 m (5' 1\").    Weight as of an earlier encounter on 9/4/24: 112.9 kg (249 lb).       # Financial/Environmental Concerns: none               Disposition Plan     Medically Ready for Discharge: Anticipated in 2-4 Days      Ketty De Santiago MD  Hospitalist Service  [facility]  Securely message with [email or web address] (more info)  Text page via [employer] Paging/Directory "   ______________________________________________________________________    Interval History   No new complaints today and no acute events overnight.  Wound dressing was changed during the evaluation by wound care nurse.  She has extensive superficial ulcers in the right leg with sloughs and greenish soakage on the wound dressing.    Physical Exam   Vital Signs: Temp: 98.6  F (37  C) Temp src: Oral BP: 116/54 Pulse: 75   Resp: 18 SpO2: 97 % O2 Device: Nasal cannula Oxygen Delivery: 1 LPM  Weight: 0 lbs 0 oz    General appearance: Obese, awake, Alert, Cooperative, not in any obvious distress and appears stated age   HEENT: Normocephalic, atraumatic, conjunctiva clear without icterus and ears without discharge  Lungs: Clear to auscultation bilaterally, no wheezing, good air exchange, normal work of breathing  Cardiovascular: Regular Rate and Rythm, normal apical impulse, normal S1 and S2, no lower extremity edema bilaterally  Abdomen: Soft, non-tender and Non-distended, active bowel sounds  Skin: Extensive superficial ulcers in the right leg with sloughs and greenish soakage on the wound dressing.  States dermatitis of the left lower extremity.  Musculoskeletal: No bony deformities or joint tenderness. Normal ROM upon flexion & extension.   Neurologic: Alert & Oriented X 3, Facial symmetry preserved and upper & lower extremities moving well with symmetry  Psychiatric: Calm, normal eye contact and normal affect      Medical Decision Making       40 MINUTES SPENT BY ME on the date of service doing chart review, history, exam, documentation & further activities per the note.      Data     I have personally reviewed the following data over the past 24 hrs:    14.8 (H)  \   8.5 (L)   / 295     137 102 29.3 (H) /  108 (H)   4.1 27 1.21 (H) \       Imaging results reviewed over the past 24 hrs:   Recent Results (from the past 24 hour(s))   US Lower Extremity Venous Duplex Right    Narrative    EXAM: US LOWER EXTREMITY VENOUS  DUPLEX RIGHT  LOCATION: [facility]  DATE: 9/5/2024    INDICATION: Asymmetric lower extremity swelling, eval for DVT  COMPARISON: Lower extremity venous ultrasound dated 7/19/2024  TECHNIQUE: Venous Duplex ultrasound of the right lower extremity with and without compression, augmentation and duplex. Color flow and spectral Doppler with waveform analysis performed.    FINDINGS: Exam includes the common femoral, femoral, popliteal, and contralateral common femoral veins as well as segmentally visualized deep calf veins and greater saphenous vein. Nonvisualization of the posterior tibial and peroneal veins secondary to   edema/swelling.    RIGHT: No deep vein thrombosis. No superficial thrombophlebitis. No popliteal cyst. Prominent right groin lymph nodes.      Impression    IMPRESSION:  1.  No deep venous thrombosis in the right lower extremity.

## 2024-09-05 NOTE — DISCHARGE INSTRUCTIONS
RLE - Every other day  Cleanse with Vashe (moisten towel or gauze with Vashe and place around leg; leave in place 5 - 10 minutes); gently dry.  Apply Critic Aid to periwound skin.  Apply moistened Hydrofera Blue (at least 3) to open wound beds.  Cover with ABD pads and secure with roll gauze.    LLE - Daily  Cleanse with water or bath wipes.  Apply Aquaphor from knee to foot (not between toes)

## 2024-09-05 NOTE — PHARMACY-ADMISSION MEDICATION HISTORY
Admission medication history completed at [facility]. Please see Pharmacy Intern Admission Medication History note from 09/04/2024.

## 2024-09-05 NOTE — H&P
[facility]    History and Physical - Hospitalist Service       Date of Admission:  9/4/2024    Assessment & Plan   Principal Problem:    Cellulitis  Active Problems:    Venous hypertension of lower extremity, bilateral    Acquired lymphedema of leg    Morbid obesity with BMI of 50.0-59.9, adult (H)    MS (multiple sclerosis) (H)    GERD (gastroesophageal reflux disease)    Essential hypertension    Major depression, single episode, in complete remission (H24)    Peripheral venous insufficiency    Pain associated with wound    Sepsis (H)        79 year old female with history of hronic wound on her right lower extremity, lymphedema of bilateral lower extremities, GERD, HTN, HLD, MS, restless legs, morbid obesity and mood disorder who presents 9/4/24 for evaluation of fever and generalized weakness and found to have sepsis related to RLE cellulitis.     Sepsis  Cellulitis of right lower extremity  Chronic RLE wound  -- start IV vanco and zosyn  -- check RLE US to exclude DVT  -- ID consult for the AM  -- dilaudid prn pain  -- hold further IVF given chronic lymphedema and stable hemodynamics  -- hold home lasix and spironolactone for now  -- trend WBC in AM  -- follow up blood cultures  -- WOC to see in AM       Mild hypoxia on admission  Patient is asymptomatic  Suspect poor probe sensing since Sp02 raised to 100% during my interview.   CXR on admission shows shallow inspiration, normal vascularity, and likely volume loss RLL with elevation of right hemidiaphargm  -- push IS  -- wean oxygen as tolerated       KIKE  Creatinine 1.0 today, trending up from 0.8  --Hold hold home spirolactone and lasix  -- Trend BMP in AM      Chronic hypokalemia and hypomagnesemia:   -- continue home replacements  -- trend K and Mag in AM       Generalized Weakness, likely related to above  H/O MS  -- PT/OT        Anemia, chronic  HGB today 10.2, base line HGB approximately 8.5-11.1  -- continue home ferrous  "sulfate       Lymphedema of bilateral lower extremities  Neuropathy   Restless leg syndrome  -- continue home gabapentin and ropinirole  -- lymphedema therapy consult       H/O Hypertension  -- holding home lasix and spirolactone for KIKE as above      Hyperlipidemia  -- continue home simvastatin       Depression  -- continue home Buprion and citalopram       GERD: continue home PPI         DVT Prophylaxis: Heparin SQ  Amato Catheter: Not present  Lines: PRESENT             Cardiac Monitoring: None  Code Status: Full Code      Clinically Significant Risk Factors Present on Admission              # Hypoalbuminemia: Lowest albumin = 3.2 g/dL at 9/4/2024 10:47 AM, will monitor as appropriate   # Drug Induced Platelet Defect: home medication list includes an antiplatelet medication   # Hypertension: Noted on problem list    # Anemia: based on hgb <11       # Severe Obesity: Estimated body mass index is 47.05 kg/m  as calculated from the following:    Height as of an earlier encounter on 9/4/24: 1.549 m (5' 1\").    Weight as of an earlier encounter on 9/4/24: 112.9 kg (249 lb).       # Financial/Environmental Concerns:                 Disposition Plan      Expected Discharge Date: 09/06/2024             Medically Ready for Discharge: Anticipated in 2-4 Days      Corey Rosenberg, DO  Hospitalist Service  [facility]  Securely message with [employer] (more info)  Text page via [email or web address] Paging/Directory     ______________________________________________________________________    Chief Complaint   Fever, weakness    History is obtained from the patient    History of Present Illness   Elizabeth Kelley is a 79 year old female who presents with body aches, subjective fever, nausea and increased right lower extremity pain and diffuse erythema      Past Medical History    Past Medical History:   Diagnosis Date    Ankle contracture, left     Class 3 severe obesity due to excess calories without serious " comorbidity with body mass index (BMI) of 45.0 to 49.9 in adult (H)     Combined gastric and duodenal ulcer     Controlled substance agreement broken     Depression     Dyslipidemia     Edema     Edema     Gait abnormality     GERD (gastroesophageal reflux disease)     Gout     Lymphedema of both lower extremities     Melanoma (H)     left upper arm    MS (multiple sclerosis) (H)     RLS (restless legs syndrome)     Skin ulcer of left lower leg (H)     Valgus deformity of both feet     Vitamin D deficiency        Past Surgical History   Past Surgical History:   Procedure Laterality Date    ABLATION SAPHENOUS VEIN W/ RFA Right 04/12/2021    Saphenous vein, anterior accessory saphenous vein, sclerotherapy of multiple veins.    COSMETIC SURGERY  1988    reduction of excess skin from weight loss    ESOPHAGOSCOPY, GASTROSCOPY, DUODENOSCOPY (EGD), COMBINED N/A 03/30/2015    Procedure: UPPER ENDOSCOPY with gastric biopsy;  Surgeon: Checo Fletcher MD;  Location: [facility];  Service:     IRRIGATION AND DEBRIDEMENT LOWER EXTREMITY, COMBINED Right 3/21/2022    Procedure: RIGHT LEG WOUND DEBRIDEMENT, PAULIE DERMATONE, MISONIX, VAC VERA FLOW WITH VASHE;  Surgeon: Gabriele Bullock MD;  Location:  OR    IRRIGATION AND DEBRIDEMENT LOWER EXTREMITY, COMBINED Right 3/28/2022    Procedure: DEBRIDEMENT AND WOUND DRESSING CHANGE AND MYRIAD APPLICATION OF RIGHT LOWER LEG WOUND;  Surgeon: Gabriele Bullock MD;  Location:  OR    MAMMOPLASTY REDUCTION Bilateral     MOHS MICROGRAPHIC PROCEDURE      left upper arm, melanoma    PICC DOUBLE LUMEN PLACEMENT  7/21/2024    PICC SINGLE LUMEN PLACEMENT  8/13/2021         PICC SINGLE LUMEN PLACEMENT  9/2/2021         SPINE SURGERY      L5 area surgery, decompression       Prior to Admission Medications   Prior to Admission Medications   Prescriptions Last Dose Informant Patient Reported? Taking?   Bioflavonoid Products (CINDY-C/BIOFLAVONOIDS PO)   Yes No   Sig: Take 1 tablet by mouth  every evening.   Carboxymethylcellulose Sodium 0.25 % SOLN   Yes No   Sig: Apply 1 drop to eye daily as needed   Wound Cleansers (VASHE WOUND THERAPY EX)   Yes No   Sig: Externally apply topically daily as needed   acetaminophen (TYLENOL) 500 MG tablet   Yes No   Sig: Take 500 mg by mouth 2 times daily. 500 mg in the morning and 500 mg at noon   acetaminophen (TYLENOL) 500 MG tablet   Yes No   Sig: Take 1,000 mg by mouth every evening.   ascorbic acid (VITAMIN C) 250 MG CHEW chewable tablet   Yes No   Sig: Take 250 mg by mouth daily.   aspirin (ASPIRIN 81) 81 MG chewable tablet   Yes No   Sig: Take 1 tablet by mouth every morning   buPROPion (WELLBUTRIN SR) 150 MG 12 hr tablet  Self Yes No   Sig: Take 150 mg by mouth every morning    citalopram (CELEXA) 40 MG tablet  Self Yes No   Sig: Take 40 mg by mouth daily (with lunch)   ferrous sulfate (FEROSUL) 325 (65 Fe) MG tablet   Yes No   Sig: Take 325 mg by mouth three times a week   furosemide (LASIX) 20 MG tablet   Yes No   Sig: Take 20 mg by mouth 2 times daily   gabapentin (NEURONTIN) 100 MG capsule   Yes No   Sig: Take 100 mg by mouth 3 times daily   magnesium oxide (MAG-OX) 400 MG tablet   Yes No   Sig: Take 1 tablet by mouth every morning   multivitamin w/minerals (CENTRUM ADULTS) tablet  Self Yes No   Sig: Take 1 tablet by mouth every morning   nitroGLYcerin (NITROSTAT) 0.4 MG sublingual tablet   Yes No   Sig: PLACE 1 TABLET UNDER TONGUE EVERY 5 MINTUES AS NEEDED FOR CHEST PAIN FOR UP TO 30 DAYS   pantoprazole (PROTONIX) 40 MG EC tablet   Yes No   Sig: Take 40 mg by mouth every morning   potassium chloride ER (K-TAB) 20 MEQ CR tablet   Yes No   Sig: Take 20 mEq by mouth every evening.   rOPINIRole (REQUIP) 1 MG tablet  Self Yes No   Sig: Take 1 mg by mouth every evening In addition, patient takes one half tablet (0.5 mg) in the morning   rOPINIRole (REQUIP) 1 MG tablet   Yes No   Sig: Take 0.5 mg by mouth every morning In addition, patient takes one tablet (1  mg) in the evening   rOPINIRole (REQUIP) 1 MG tablet   Yes No   Sig: Take 0.5-1 mg by mouth daily as needed. At noon   simvastatin (ZOCOR) 40 MG tablet  Self Yes No   Sig: [SIMVASTATIN (ZOCOR) 40 MG TABLET] Take 40 mg by mouth bedtime.   spironolactone (ALDACTONE) 25 MG tablet  Self Yes No   Sig: Take 25 mg by mouth every morning   vitamin B-Complex  Self No No   Sig: Take 1 tablet by mouth daily   vitamin D3 (CHOLECALCIFEROL) 250 mcg (58000 units) capsule  Self No No   Sig: Take 1 capsule (250 mcg) by mouth daily   zinc 50 MG TABS   Yes No   Sig: Take 1 tablet by mouth every morning      Facility-Administered Medications Last Administration Doses Remaining   oxyCODONE (ROXICODONE) tablet 5 mg None recorded               Physical Exam   Vital Signs: Temp: 98.4  F (36.9  C) Temp src: Oral BP: 136/65 Pulse: 76   Resp: 20 SpO2: 100 % O2 Device: Nasal cannula Oxygen Delivery: 1 LPM  Weight: 0 lbs 0 oz    General Appearance: In no acute distress  RESPIRATORY: respirations nonlabored  CARDIOVASCULAR: 2+ le edema bilat.  ABDOMEN: soft and non-tender  SKIN/HAIR/NAILS: diffuse erythema RLE with weeping RLE wound  NEUROLOGIC: No focal arm or leg  weakness, speech is clear         Medical Decision Making       >75 MINUTES SPENT BY ME on the date of service doing chart review, history, exam, documentation & further activities per the note.      Data

## 2024-09-05 NOTE — PROGRESS NOTES
09/05/24 0900   Appointment Info   Signing Clinician's Name / Credentials (PT) Nataly Grace, PT, DPT   Living Environment   People in Home significant other   Current Living Arrangements house   Home Accessibility stairs to enter home   Number of Stairs, Main Entrance 5   Stair Railings, Main Entrance railing on right side (ascending)   Transportation Anticipated family or friend will provide   Living Environment Comments All needs met on the main level, and significant other is able to assist if needed.   Self-Care   Usual Activity Tolerance good   Current Activity Tolerance moderate   Equipment Currently Used at Home none   Fall history within last six months no   Activity/Exercise/Self-Care Comment Patient owns a cane and a walker if needed.   General Information   Onset of Illness/Injury or Date of Surgery 09/04/24   Referring Physician Corey Rosenberg DO   Patient/Family Therapy Goals Statement (PT) Return home   Pertinent History of Current Problem (include personal factors and/or comorbidities that impact the POC) Cellulitis   Existing Precautions/Restrictions fall   Range of Motion (ROM)   Range of Motion ROM deficits secondary to swelling   Strength (Manual Muscle Testing)   Strength (Manual Muscle Testing) No deficits observed during functional mobility   Bed Mobility   Comment, (Bed Mobility) Patient started in transport chair and ended in recliner.   Transfers   Transfers bed-chair transfer;sit-stand transfer   Impairments Contributing to Impaired Transfers pain   Bed-Chair Transfer   Assistive Device (Bed-Chair Transfers) other (see comments)  (None)   Bed-Chair [place] (Transfers) supervision   Sit-Stand Transfer   Sit-Stand [place] (Transfers) supervision   Assistive Device (Sit-Stand Transfers) other (see comments)  (None)   Gait/Stairs (Locomotion)   [place] Level (Gait) supervision   Assistive Device (Gait) other (see comments)  (None)   Distance in Feet (Gait) 25'   Pattern  (Gait) step-through   Deviations/Abnormal Patterns (Gait) base of support, wide   Comment, (Gait/Stairs) Stairs not tested due to pain in RLE.   Balance   Balance no deficits were identified   Clinical Impression   Criteria for Skilled Therapeutic Intervention Yes, treatment indicated   PT Diagnosis (PT) Impaired functional mobility   Influenced by the following impairments Pain and swelling in RLE   Functional limitations due to impairments Transfers, gait, stairs   Clinical Presentation (PT Evaluation Complexity) stable   Clinical Presentation Rationale Patient presents as medically diagnosed.   Clinical Decision Making (Complexity) low complexity   Planned Therapy Interventions (PT) balance training;gait training;home exercise program;patient/family education;ROM (range of motion);stair training;strengthening;transfer training;progressive activity/exercise;home program guidelines   Risk & Benefits of therapy have been explained evaluation/treatment results reviewed;care plan/treatment goals reviewed;patient   PT Total Evaluation Time   PT [place], Low Complexity Minutes (60010) 15   Physical Therapy Goals   PT Frequency Daily   PT Predicted Duration/Target Date for Goal Attainment 09/12/24   PT Goals Transfers;Gait;Stairs   PT: Transfers Independent;Sit to/from stand   PT: Gait Supervision/stand-by assist;150 feet   PT: Stairs Supervision/stand-by assist;5 stairs;Rail on right   PT Discharge Planning   PT Plan Bring to therapy gym for stairs, gait, transfers   PT Discharge Recommendation (DC Rec) home with assist   PT Rationale for DC Rec Patient reports good home setup and support from significant other.   PT Brief overview of current status Patient is able to transfer and ambulate short distances with supervision.   Total Session Time   Total Session Time (sum of timed and untimed services) 15

## 2024-09-05 NOTE — CONSULTS
Care Management Initial Consult    General Information  Assessment completed with: Patient, Patient  Type of CM/SW Visit: Initial Assessment    Primary Care Provider verified and updated as needed: Yes   Readmission within the last 30 days: current reason for admission unrelated to previous admission      Reason for Consult: discharge planning  Advance Care Planning: Advance Care Planning Reviewed: present on chart        Communication Assessment  Patient's communication style: spoken language (English or Bilingual)        Cognitive  Cognitive/Neuro/Behavioral: WDL                      Living Environment:   People in home: significant other  Jaleel  Current living Arrangements: house      Able to return to prior arrangements: yes     Family/Social Support:  Care provided by: self, spouse/significant other  Provides care for: no one  Marital Status: Single  Support system: Significant Other       Jaleel  Description of Support System: Supportive    Support Assessment: Adequate family and caregiver support    Current Resources:   Patient receiving home care services: Yes  Skilled Home Care Services: Skilled Nursing     Community Resources: None  Equipment currently used at home: walker, standard  Supplies currently used at home: Other (leg compressions)    Employment/Financial:  Employment Status: employed full-time, employed part-time        Financial Concerns: none   Referral to Financial Worker: No     Does the patient's insurance plan have a 3 day qualifying hospital stay waiver?  No    Lifestyle & Psychosocial Needs:  Social Determinants of Health     Food Insecurity: Not on file   Depression: Not on file   Housing Stability: Not on file   Tobacco Use: Medium Risk (9/4/2024)    Patient History     Smoking Tobacco Use: Former     Smokeless Tobacco Use: Never     Passive Exposure: Not on file   Financial Resource Strain: Not on file   Alcohol Use: Not on file   Transportation Needs: Not on file   Physical Activity: Not  on file   Interpersonal Safety: Not on file   Stress: Not on file   Social Connections: Not on file   Health Literacy: Not on file     Functional Status:  Prior to admission patient needed assistance:   Dependent ADLs:: Ambulation-cane, Ambulation-walker  Dependent IADLs:: Independent     Discussed  Partnership in Safe Discharge Planning  document with patient/family: No    Additional Information:  Writer met with patient at bedside to review role of care management services, discuss goals of care and assess need for any possible services at discharge. Patient alert, answering questions appropriately and engaged in the conversation. Address, phone number and PCP confirmed. Patient has HCD on file. Lives with SO in a house. Report independent with ADLs/IADLs. Use cane in the house. Has walkers. Work part-time. Has nurse visits 3x/wk from [facility] for lymphedema care. Goal is to return home with home care. Anticipate family/friends will transport.        Next Steps: RNCM to follow for medical progression, recommendations, and final discharge plan.     Mel Whitten RN

## 2024-09-05 NOTE — PLAN OF CARE
Problem: Wound  Goal: Optimal Pain Control and Function  Intervention: Prevent or Manage Pain  Recent Flowsheet Documentation  Taken 9/5/2024 0012 by Shena Méndez, RN  Pain Management Interventions: rest     Problem: Adult Inpatient Plan of Care  Goal: Absence of Hospital-Acquired Illness or Injury  Intervention: Prevent Skin Injury  Recent Flowsheet Documentation  Taken 9/5/2024 0012 by Shena Méndez, RN  Body Position: position changed independently   Goal Outcome Evaluation:        Pt A/O, has clean dry, ace wrap on the right leg, remain on contact precaution, Mag recheck in morning, c/o headache PRN Tylenol given. Purewick in place, assist of two.

## 2024-09-05 NOTE — CONSULTS
[facility]  WO Nurse Inpatient Assessment     Consulted for: RLE    Summary: LLE healed - patient just needs moisturizer - discussed with hospitalist and will start Aquaphor to LLE.    Patient History (according to provider note(s):    Assessment & Plan  Principal Problem:    Cellulitis  Active Problems:    Venous hypertension of lower extremity, bilateral    Acquired lymphedema of leg    Morbid obesity with BMI of 50.0-59.9, adult (H)    MS (multiple sclerosis) (H)    GERD (gastroesophageal reflux disease)    Essential hypertension    Major depression, single episode, in complete remission (H24)    Peripheral venous insufficiency    Pain associated with wound    Sepsis (H)      79 year old female with history of hronic wound on her right lower extremity, lymphedema of bilateral lower extremities, GERD, HTN, HLD, MS, restless legs, morbid obesity and mood disorder who presents 9/4/24 for evaluation of fever and generalized weakness and found to have sepsis related to RLE cellulitis.      Sepsis  Cellulitis of right lower extremity  Chronic RLE wound  -- start IV vanco and zosyn  -- check RLE US to exclude DVT  -- ID consult for the AM  -- dilaudid prn pain  -- hold further IVF given chronic lymphedema and stable hemodynamics  -- hold home lasix and spironolactone for now  -- trend WBC in AM  -- follow up blood cultures  -- WOC to see in AM    Assessment:    Wound location: RLE     Last photo: 9/5/24  Wound due to: Venous Ulcer  Wound history/plan of care: Follows at wound clinic with JR - posterior leg well healed. Continue OP plan of care. Pt in agreement.  Wound base: 100% Dermis     Palpation of the wound bed: normal      Drainage: moderate     Description of drainage: serous and green     Measurements (length x width x depth, in cm): Approximately 26 cm x 15 cm     Tunneling: N/A     Undermining: N/A  Periwound skin: Dry/scaly      Color: pink and red      Temperature: normal   Odor:  none  Pain: mild, sharp  Pain interventions prior to dressing change: patient tolerated well, no significant pain present , slow and gentle cares , and distraction  Treatment goal: Heal , Drainage control, Infection control/prevention, Maintain (prevention of deterioration), and Protection  STATUS: initial assessment this hospitalization  Supplies ordered: ordered Vashe, HF Blue, Critic Aid      Treatment Plan:     RLE - Every other day  Cleanse with Vashe (moisten towel or gauze with Vashe and place around leg; leave in place 5 - 10 minutes); gently dry.  Apply Critic Aid to periwound skin.  Apply moistened Hydrofera Blue (at least 3) to open wound beds.  Cover with ABD pads and secure with roll gauze.  Hydrofera Blue Classic PS# [id number] and Vashe 475 ml PS# [id number] and Critic Aid PS# [id number] (order from stores when needed)    LLE - Daily  Cleanse with water or bath wipes.  Apply Aquaphor from knee to foot (not between toes)    Orders: Written    RECOMMEND PRIMARY TEAM ORDER: None, at this time  Education provided: plan of care  Discussed plan of care with: Patient  WOC nurse follow-up plan: weekly  Notify WOC if wound(s) deteriorate.  Nursing to notify the Provider(s) and re-consult the WOC Nurse if new skin concern.    DATA:     Current support surface: Standard  Standard gel mattress (Isoflex)  Containment of urine/stool: Incontinence Protocol  BMI: There is no height or weight on file to calculate BMI.   Active diet order: Orders Placed This Encounter      Regular Diet Adult     Output: I/O last 3 completed shifts:  In: -   Out: 100 [Urine:100]     Labs:   Recent Labs   Lab 09/05/24  0558 09/04/24  1047   ALBUMIN  --  3.2*   HGB 8.5* 10.2*   WBC 14.8* 21.2*     Pressure injury risk assessment:   Sensory Perception: 3-->slightly limited  Moisture: 3-->occasionally moist  Activity: 2-->chairfast  Mobility: 3-->slightly limited  Nutrition: 3-->adequate  Friction and Shear: 2-->potential problem  Ajith Score:  16    Laura Sotelo MSN RN CWOCN  Pager no longer is use, please contact through [employer] group: [facility] [employer]

## 2024-09-06 ENCOUNTER — APPOINTMENT (OUTPATIENT)
Dept: OCCUPATIONAL THERAPY | Facility: HOSPITAL | Age: 79
DRG: 872 | End: 2024-09-06
Attending: INTERNAL MEDICINE
Payer: COMMERCIAL

## 2024-09-06 ENCOUNTER — APPOINTMENT (OUTPATIENT)
Dept: PHYSICAL THERAPY | Facility: HOSPITAL | Age: 79
DRG: 872 | End: 2024-09-06
Attending: INTERNAL MEDICINE
Payer: COMMERCIAL

## 2024-09-06 LAB
CREAT SERPL-MCNC: 0.95 MG/DL (ref 0.51–0.95)
EGFRCR SERPLBLD CKD-EPI 2021: 61 ML/MIN/1.73M2
VANCOMYCIN SERPL-MCNC: 8.8 UG/ML

## 2024-09-06 PROCEDURE — 999N000248 HC STATISTIC IV INSERT WITH US BY RN

## 2024-09-06 PROCEDURE — 120N000001 HC R&B MED SURG/OB

## 2024-09-06 PROCEDURE — 97530 THERAPEUTIC ACTIVITIES: CPT | Mod: GP

## 2024-09-06 PROCEDURE — 250N000013 HC RX MED GY IP 250 OP 250 PS 637: Performed by: INTERNAL MEDICINE

## 2024-09-06 PROCEDURE — 36415 COLL VENOUS BLD VENIPUNCTURE: CPT | Performed by: INTERNAL MEDICINE

## 2024-09-06 PROCEDURE — 80202 ASSAY OF VANCOMYCIN: CPT | Performed by: INTERNAL MEDICINE

## 2024-09-06 PROCEDURE — 97535 SELF CARE MNGMENT TRAINING: CPT | Mod: GO

## 2024-09-06 PROCEDURE — 97116 GAIT TRAINING THERAPY: CPT | Mod: GP

## 2024-09-06 PROCEDURE — 99232 SBSQ HOSP IP/OBS MODERATE 35: CPT | Performed by: INTERNAL MEDICINE

## 2024-09-06 PROCEDURE — 250N000011 HC RX IP 250 OP 636: Performed by: INTERNAL MEDICINE

## 2024-09-06 PROCEDURE — 82565 ASSAY OF CREATININE: CPT | Performed by: INTERNAL MEDICINE

## 2024-09-06 PROCEDURE — 258N000003 HC RX IP 258 OP 636: Performed by: INTERNAL MEDICINE

## 2024-09-06 RX ADMIN — BUPROPION HYDROCHLORIDE 150 MG: 150 TABLET, FILM COATED, EXTENDED RELEASE ORAL at 08:42

## 2024-09-06 RX ADMIN — HYDROMORPHONE HYDROCHLORIDE 0.4 MG: 0.2 INJECTION, SOLUTION INTRAMUSCULAR; INTRAVENOUS; SUBCUTANEOUS at 14:04

## 2024-09-06 RX ADMIN — ASPIRIN 81 MG CHEWABLE TABLET 81 MG: 81 TABLET CHEWABLE at 08:42

## 2024-09-06 RX ADMIN — ROPINIROLE HYDROCHLORIDE 0.5 MG: 0.25 TABLET, FILM COATED ORAL at 08:42

## 2024-09-06 RX ADMIN — CITALOPRAM HYDROBROMIDE 40 MG: 20 TABLET ORAL at 12:21

## 2024-09-06 RX ADMIN — PIPERACILLIN AND TAZOBACTAM 3.38 G: 3; .375 INJECTION, POWDER, FOR SOLUTION INTRAVENOUS at 15:49

## 2024-09-06 RX ADMIN — FERROUS SULFATE TAB 325 MG (65 MG ELEMENTAL FE) 325 MG: 325 (65 FE) TAB at 08:42

## 2024-09-06 RX ADMIN — GABAPENTIN 100 MG: 100 CAPSULE ORAL at 08:42

## 2024-09-06 RX ADMIN — HEPARIN SODIUM 5000 UNITS: 10000 INJECTION, SOLUTION INTRAVENOUS; SUBCUTANEOUS at 21:00

## 2024-09-06 RX ADMIN — GABAPENTIN 100 MG: 100 CAPSULE ORAL at 13:52

## 2024-09-06 RX ADMIN — PIPERACILLIN AND TAZOBACTAM 3.38 G: 3; .375 INJECTION, POWDER, FOR SOLUTION INTRAVENOUS at 08:44

## 2024-09-06 RX ADMIN — PIPERACILLIN AND TAZOBACTAM 3.38 G: 3; .375 INJECTION, POWDER, FOR SOLUTION INTRAVENOUS at 01:07

## 2024-09-06 RX ADMIN — VANCOMYCIN HYDROCHLORIDE 1000 MG: 1 INJECTION, SOLUTION INTRAVENOUS at 09:16

## 2024-09-06 RX ADMIN — SODIUM CHLORIDE: 9 INJECTION, SOLUTION INTRAVENOUS at 01:11

## 2024-09-06 RX ADMIN — ROPINIROLE HYDROCHLORIDE 1 MG: 1 TABLET, FILM COATED ORAL at 20:58

## 2024-09-06 RX ADMIN — HEPARIN SODIUM 5000 UNITS: 10000 INJECTION, SOLUTION INTRAVENOUS; SUBCUTANEOUS at 05:43

## 2024-09-06 RX ADMIN — HEPARIN SODIUM 5000 UNITS: 10000 INJECTION, SOLUTION INTRAVENOUS; SUBCUTANEOUS at 13:52

## 2024-09-06 RX ADMIN — WHITE PETROLATUM: 1.75 OINTMENT TOPICAL at 10:56

## 2024-09-06 RX ADMIN — PANTOPRAZOLE SODIUM 40 MG: 40 TABLET, DELAYED RELEASE ORAL at 08:42

## 2024-09-06 RX ADMIN — HYDROMORPHONE HYDROCHLORIDE 0.4 MG: 0.2 INJECTION, SOLUTION INTRAMUSCULAR; INTRAVENOUS; SUBCUTANEOUS at 18:40

## 2024-09-06 RX ADMIN — GABAPENTIN 100 MG: 100 CAPSULE ORAL at 20:57

## 2024-09-06 RX ADMIN — HYDROMORPHONE HYDROCHLORIDE 0.4 MG: 0.2 INJECTION, SOLUTION INTRAMUSCULAR; INTRAVENOUS; SUBCUTANEOUS at 01:07

## 2024-09-06 RX ADMIN — POTASSIUM CHLORIDE 20 MEQ: 1500 TABLET, EXTENDED RELEASE ORAL at 20:57

## 2024-09-06 RX ADMIN — SIMVASTATIN 40 MG: 10 TABLET, FILM COATED ORAL at 20:58

## 2024-09-06 RX ADMIN — MAGNESIUM OXIDE TAB 400 MG (241.3 MG ELEMENTAL MG) 400 MG: 400 (241.3 MG) TAB at 08:42

## 2024-09-06 RX ADMIN — HYDROMORPHONE HYDROCHLORIDE 0.4 MG: 0.2 INJECTION, SOLUTION INTRAMUSCULAR; INTRAVENOUS; SUBCUTANEOUS at 09:24

## 2024-09-06 ASSESSMENT — ACTIVITIES OF DAILY LIVING (ADL)
ADLS_ACUITY_SCORE: 32
ADLS_ACUITY_SCORE: 45
ADLS_ACUITY_SCORE: 32
ADLS_ACUITY_SCORE: 45
ADLS_ACUITY_SCORE: 32
ADLS_ACUITY_SCORE: 45
ADLS_ACUITY_SCORE: 45
ADLS_ACUITY_SCORE: 32
ADLS_ACUITY_SCORE: 45
ADLS_ACUITY_SCORE: 32
ADLS_ACUITY_SCORE: 32
ADLS_ACUITY_SCORE: 45
ADLS_ACUITY_SCORE: 32
ADLS_ACUITY_SCORE: 45
ADLS_ACUITY_SCORE: 45
ADLS_ACUITY_SCORE: 32
ADLS_ACUITY_SCORE: 32

## 2024-09-06 NOTE — PROGRESS NOTES
Care Management Follow Up    Length of Stay (days): 2    Expected Discharge Date: 09/07/2024    Anticipated Discharge Plan:   home w/w/o IV infusion    Transportation: Anticipate Family/friend    PT Recommendations: home with assist  OT Recommendations:        Barriers to Discharge: medical stability, IV abx, diagnostic workup    Prior Living Situation: house with significant other    Discussed  Partnership in Safe Discharge Planning  document with patient/family: Yes: family     Handoff Completed: No, handoff not indicated or clinically appropriate    Patient/Spokesperson Updated: No    Additional Information:  MD update: anticipate no WKND CM needs    Next Steps: ID final tx plan, cultures and IV antbx    Praveena Ortiz RN

## 2024-09-06 NOTE — PROGRESS NOTES
[facility]    Medicine Progress Note - Hospitalist Service    Date of Admission:  9/4/2024    Assessment & Plan   Principal Problem:    Cellulitis  Active Problems:    Venous hypertension of lower extremity, bilateral    Acquired lymphedema of leg    Morbid obesity with BMI of 50.0-59.9, adult (H)    MS (multiple sclerosis) (H)    GERD (gastroesophageal reflux disease)    Essential hypertension    Major depression, single episode, in complete remission (H24)    Peripheral venous insufficiency    Pain associated with wound    Sepsis (H)        79 year old female with history of hronic wound on her right lower extremity, lymphedema of bilateral lower extremities, GERD, HTN, HLD, MS, restless legs, morbid obesity and mood disorder who presents 9/4/24 for evaluation of fever and generalized weakness and found to have sepsis related to RLE cellulitis. Lower extremity venous ultrasound was negative for deep venous thrombosis.     Sepsis  Cellulitis of right lower extremity  Chronic RLE ulcers   -- Continue IV vanco and zosyn  -- dilaudid prn pain  -- hold further IVF given chronic lymphedema and stable hemodynamics  -- hold home lasix and spironolactone for now  -- follow up blood cultures  --Follow-up with wound swab  -- Wound care per wound care nurse  --ID lzenof-up-[id number] assistance       Mild hypoxia on admission  Patient is asymptomatic  Suspect poor probe sensing since Sp02 raised to 100% during my interview.   CXR on admission shows shallow inspiration, normal vascularity, and likely volume loss RLL with elevation of right hemidiaphargm  -- push IS  -- wean oxygen as tolerated       KIKE  Creatinine 1.21 compared to baseline of 0.8; now resolved  -- Continue to hold home spirolactone and lasix  -- Stop normal saline at 75 mL/h  --Monitor BMP  --Avoid nephrotoxins  -Consider nephrology evaluation if renal function is worsening      Chronic hypokalemia and hypomagnesemia:   -- continue  "home replacements  -- trend K and Mag in AM       Generalized Weakness, likely related to above  H/O MS  -- PT/OT        Anemia, chronic  HGB today 10.2, base line HGB approximately 8.5-11.1  -- continue home ferrous sulfate       Lymphedema of bilateral lower extremities  Neuropathy   Restless leg syndrome  -- continue home gabapentin and ropinirole  -- lymphedema therapy consult       H/O Hypertension  -- holding home lasix and spirolactone for KIKE as above      Hyperlipidemia  -- continue home simvastatin       Depression  -- continue home Buprion and citalopram       GERD: continue home PPI         DVT Prophylaxis: Heparin SQ  Amato Catheter: Not present  Lines: PRESENT             Cardiac Monitoring: None  Code Status: Full Code            Diet: Regular Diet Adult    DVT Prophylaxis: Heparin SQ  Amato Catheter: Not present  Lines: None     Cardiac Monitoring: None  Code Status: Full Code      Clinically Significant Risk Factors              # Hypoalbuminemia: Lowest albumin = 3.2 g/dL at 9/4/2024 10:47 AM, will monitor as appropriate     # Hypertension: Noted on problem list             # Severe Obesity: Estimated body mass index is 47.05 kg/m  as calculated from the following:    Height as of an earlier encounter on 9/4/24: 1.549 m (5' 1\").    Weight as of an earlier encounter on 9/4/24: 112.9 kg (249 lb)., PRESENT ON ADMISSION       # Financial/Environmental Concerns: none      Disposition Plan     Medically Ready for Discharge: Anticipated in 2-4 Days      Ketty De Santiago MD  Hospitalist Service  [facility]  Securely message with [employer] (more info)  Text page via [employer] Paging/Directory   ______________________________________________________________________    Interval History   She complains of nonproductive cough.  Cough started about 5 days ago.  No fever.  Attributes cough to allergies. Pain in the right leg is well-controlled.     Physical Exam   Vital Signs: Temp: 98.1  F " (36.7  C) Temp src: Oral BP: 121/57 Pulse: 71   Resp: 20 SpO2: 91 % O2 Device: None (Room air) Oxygen Delivery: 1 LPM  Weight: 0 lbs 0 oz    General appearance: Obese, awake, Alert, Cooperative, not in any obvious distress and appears stated age   HEENT: Normocephalic, atraumatic, conjunctiva clear without icterus and ears without discharge  Lungs: Clear to auscultation bilaterally, no wheezing, good air exchange, normal work of breathing  Cardiovascular: Regular Rate and Rythm, normal apical impulse, normal S1 and S2, no lower extremity edema bilaterally  Abdomen: Soft, non-tender and Non-distended, active bowel sounds  Skin: Extensive superficial ulcers in the right leg with sloughs and greenish soakage on the wound dressing.  States dermatitis of the left lower extremity.  Musculoskeletal: No bony deformities or joint tenderness. Normal ROM upon flexion & extension.   Neurologic: Alert & Oriented X 3, Facial symmetry preserved and upper & lower extremities moving well with symmetry  Psychiatric: Calm, normal eye contact and normal affect      Medical Decision Making       40 MINUTES SPENT BY ME on the date of service doing chart review, history, exam, documentation & further activities per the note.      Data     I have personally reviewed the following data over the past 24 hrs:    N/A  \   N/A   / N/A     N/A N/A N/A /  N/A   N/A N/A 0.95 \       Imaging results reviewed over the past 24 hrs:   No results found for this or any previous visit (from the past 24 hour(s)).

## 2024-09-06 NOTE — PLAN OF CARE
Problem: Adult Inpatient Plan of Care  Goal: Plan of Care Review  Description: The Plan of Care Review/Shift note should be completed every shift.  The Outcome Evaluation is a brief statement about your assessment that the patient is improving, declining, or no change.  This information will be displayed automatically on your shift  note.  Outcome: Progressing     Problem: Adult Inpatient Plan of Care  Goal: Optimal Comfort and Wellbeing  Outcome: Progressing     Problem: Skin or Soft Tissue Infection  Goal: Absence of Infection Signs and Symptoms  Outcome: Progressing  Intervention: Minimize and Manage Infection Progression  Recent Flowsheet Documentation  Taken 9/6/2024 0824 by Henrique Granda, RN  Infection Prevention:   hand hygiene promoted   personal protective equipment utilized   rest/sleep promoted   single patient room provided  Isolation Precautions: contact precautions maintained     Problem: Wound  Goal: Optimal Coping  Outcome: Progressing    Patient is alert and orientated, able to make needs known. Reports minimal pain while in bed. Premedicated with 0.4 mg IVP hydromorphone for dressing change to RLE.  Dressing changed per orders. Remains on IVF at 75 ml/hr. Receiving IV Zosyn and Vancomycin. Encouraged self repositioning. Remains on contact isolation for MRSA in wound.     Henrique Granda, RN

## 2024-09-06 NOTE — PLAN OF CARE
Problem: Adult Inpatient Plan of Care  Goal: Optimal Comfort and Wellbeing  Intervention: Monitor Pain and Promote Comfort  Recent Flowsheet Documentation  Taken 9/6/2024 0107 by Pavithra Henriquez RN  Pain Management Interventions: medication (see MAR)   Goal Outcome Evaluation: Leg pain controlled with prn hydromorphone.     CK trending up. Normal saline running at 75. Wound culture pending. Lymphedema wrap in place on RLE. IV abx given as ordered. Desats while asleep, O2 at 2L.

## 2024-09-06 NOTE — PHARMACY-VANCOMYCIN DOSING SERVICE
"Pharmacy Vancomycin Note  Date of Service 2024  Patient's  1945   79 year old, female    Indication: Skin and Soft Tissue Infection  Day of Therapy: 3  Current vancomycin regimen:  1000 mg IV q24h  Current vancomycin monitoring method: AUC  Current vancomycin therapeutic monitoring goal: 400-600 mg*h/L    [employer] Prediction of Current Vancomycin Regimen  Regimen: 1000 mg IV every 24 hours.  Start time: 09:16 on 2024  Exposure target: AUC24 (range)400-600 mg/L.hr   AUC24,ss: 311 mg/L.hr  Probability of AUC24 > 400: 13 %  Ctrough,ss: 4.7 mg/L  Probability of Ctrough,ss > 20: 0 %  Probability of nephrotoxicity (Lodise LOUIS ): 3 %    Current estimated CrCl = Estimated Creatinine Clearance: 57.8 mL/min (based on SCr of 0.95 mg/dL).    Creatinine for last 3 days  2024: 10:47 AM Creatinine 1.04 mg/dL  2024:  5:58 AM Creatinine 1.21 mg/dL  2024:  5:38 AM Creatinine 0.95 mg/dL    Recent Vancomycin Levels (past 3 days)  2024:  5:38 AM Vancomycin 8.8 ug/mL    Vancomycin IV Administrations (past 72 hours)                     vancomycin (VANCOCIN) 1,000 mg in 200 mL dextrose intermittent infusion (mg) 1,000 mg New Bag 24 0916     1,000 mg New Bag 24 0926    vancomycin (VANCOCIN) 1,750 mg in 0.9% NaCl 500 mL intermittent infusion (mg) 1,750 mg New Bag 24 1247                    Nephrotoxins and other renal medications (From now, onward)      Start     Dose/Rate Route Frequency Ordered Stop    24 0900  vancomycin (VANCOCIN) 1,500 mg in sodium chloride 0.9 % 250 mL intermittent infusion         1,500 mg  over 90 Minutes Intravenous EVERY 24 HOURS 24 1038      24 213  piperacillin-tazobactam (ZOSYN) 3.375 g vial to attach to  mL bag        Note to Pharmacy: For [facility], [facility] and [facility]: For Zosyn-naive patients, use the \"Zosyn initial dose + extended infusion\" order panel.    3.375 g  over 240 Minutes Intravenous EVERY 8 HOURS 24 5926        "          Contrast Orders - past 72 hours (72h ago, onward)      None            Interpretation of levels and current regimen:  Vancomycin level is reflective of AUC less than 400    Has serum creatinine changed greater than 50% in last 72 hours: No    Renal Function: Improving    [employer] Prediction of Planned New Vancomycin Regimen  Regimen: 1500 mg IV every 24 hours.  Start time: 09:16 on 09/07/2024  Exposure target: AUC24 (range)400-600 mg/L.hr   AUC24,ss: 467 mg/L.hr  Probability of AUC24 > 400: 76 %  Ctrough,ss: 7.1 mg/L  Probability of Ctrough,ss > 20: 0 %  Probability of nephrotoxicity (Lodise LOUIS 2009): 4 %    Plan:  Increase Dose to 1500 mg Q24H  Vancomycin monitoring method: AUC  Vancomycin therapeutic monitoring goal: 400-600 mg*h/L  Pharmacy will check vancomycin levels as appropriate in 1-3 Days.  Serum creatinine levels will be ordered daily for the first week of therapy and at least twice weekly for subsequent weeks.    Marcia Zavala, [facility]

## 2024-09-06 NOTE — PROGRESS NOTES
[facility]  Infectious Disease   Progress Note     Date of Admission:  9/4/2024    Assessment & Plan   right lower extremity recurrent cellulitis  Prior one was in July   risk factors obesity, lymphedema with open wounds,  Venous insufficiency  Leukocytosis improved 21K to 14 K        PLAN  Obtained swab cultures, follow  Continue vancomycin and Zosyn for now  Leg elevation  Wound care  Not a candidate yet for suppression     Check cultures zuly next 24-48 hrs and swith to po abx    Blayne Parham M.D.    ______________________________________________________________________    Interval History   She feels about the same      Past medical, family, and social history reviewed, unchanged from before.  All 12 systems reviewed, negative except for the above.      Physical Exam   Vital Signs: Temp: 98  F (36.7  C) Temp src: Oral BP: 134/60 Pulse: 74   Resp: 20 SpO2: 97 % O2 Device: Nasal cannula Oxygen Delivery: 1 LPM  Weight: 0 lbs 0 oz  \Gen. appearance nontoxic  Eyes no conjunctivitis or icterus  Neck no stiffness   Heartedema  Lungs no sob  Abdomen soft not tender  Extremities no synovitis,RLE massive edema/ly,phdema/  And erythema mostly below knee   Above knee erythema faded  Superificila wounds  Skin  no rash or emboli  Neurologic alert oriented no focal deficits       Data   Results for orders placed or performed during the hospital encounter of 09/04/24 (from the past 24 hour(s))   Wound Aerobic Bacterial Culture Routine With Gram Stain    Specimen: Leg, Right; Wound   Result Value Ref Range    Gram Stain Result No organisms seen     Gram Stain Result 4+ WBC seen    Creatinine   Result Value Ref Range    Creatinine 0.95 0.51 - 0.95 mg/dL    GFR Estimate 61 >60 mL/min/1.73m2   Vancomycin level   Result Value Ref Range    Vancomycin 8.8   ug/mL

## 2024-09-07 ENCOUNTER — APPOINTMENT (OUTPATIENT)
Dept: OCCUPATIONAL THERAPY | Facility: HOSPITAL | Age: 79
DRG: 872 | End: 2024-09-07
Attending: INTERNAL MEDICINE
Payer: COMMERCIAL

## 2024-09-07 ENCOUNTER — APPOINTMENT (OUTPATIENT)
Dept: PHYSICAL THERAPY | Facility: HOSPITAL | Age: 79
DRG: 872 | End: 2024-09-07
Attending: INTERNAL MEDICINE
Payer: COMMERCIAL

## 2024-09-07 ENCOUNTER — APPOINTMENT (OUTPATIENT)
Dept: CT IMAGING | Facility: HOSPITAL | Age: 79
DRG: 872 | End: 2024-09-07
Attending: INTERNAL MEDICINE
Payer: COMMERCIAL

## 2024-09-07 ENCOUNTER — APPOINTMENT (OUTPATIENT)
Dept: RADIOLOGY | Facility: HOSPITAL | Age: 79
DRG: 872 | End: 2024-09-07
Attending: INTERNAL MEDICINE
Payer: COMMERCIAL

## 2024-09-07 LAB
CREAT SERPL-MCNC: 0.9 MG/DL (ref 0.51–0.95)
EGFRCR SERPLBLD CKD-EPI 2021: 66 ML/MIN/1.73M2
ERYTHROCYTE [DISTWIDTH] IN BLOOD BY AUTOMATED COUNT: 14.8 % (ref 10–15)
GLUCOSE BLDC GLUCOMTR-MCNC: 103 MG/DL (ref 70–99)
HCT VFR BLD AUTO: 27.7 % (ref 35–47)
HGB BLD-MCNC: 8.2 G/DL (ref 11.7–15.7)
MCH RBC QN AUTO: 26.1 PG (ref 26.5–33)
MCHC RBC AUTO-ENTMCNC: 29.6 G/DL (ref 31.5–36.5)
MCV RBC AUTO: 88 FL (ref 78–100)
NT-PROBNP SERPL-MCNC: 605 PG/ML (ref 0–1800)
PLATELET # BLD AUTO: 321 10E3/UL (ref 150–450)
RBC # BLD AUTO: 3.14 10E6/UL (ref 3.8–5.2)
WBC # BLD AUTO: 7.4 10E3/UL (ref 4–11)

## 2024-09-07 PROCEDURE — 83880 ASSAY OF NATRIURETIC PEPTIDE: CPT | Performed by: INTERNAL MEDICINE

## 2024-09-07 PROCEDURE — 97116 GAIT TRAINING THERAPY: CPT | Mod: GP

## 2024-09-07 PROCEDURE — 82565 ASSAY OF CREATININE: CPT | Performed by: INTERNAL MEDICINE

## 2024-09-07 PROCEDURE — 97535 SELF CARE MNGMENT TRAINING: CPT | Mod: GO

## 2024-09-07 PROCEDURE — 71045 X-RAY EXAM CHEST 1 VIEW: CPT

## 2024-09-07 PROCEDURE — 120N000001 HC R&B MED SURG/OB

## 2024-09-07 PROCEDURE — 71250 CT THORAX DX C-: CPT

## 2024-09-07 PROCEDURE — 93010 ELECTROCARDIOGRAM REPORT: CPT | Performed by: STUDENT IN AN ORGANIZED HEALTH CARE EDUCATION/TRAINING PROGRAM

## 2024-09-07 PROCEDURE — 36415 COLL VENOUS BLD VENIPUNCTURE: CPT | Performed by: INTERNAL MEDICINE

## 2024-09-07 PROCEDURE — 999N000157 HC STATISTIC RCP TIME EA 10 MIN

## 2024-09-07 PROCEDURE — 250N000011 HC RX IP 250 OP 636: Performed by: INTERNAL MEDICINE

## 2024-09-07 PROCEDURE — 99232 SBSQ HOSP IP/OBS MODERATE 35: CPT | Performed by: INTERNAL MEDICINE

## 2024-09-07 PROCEDURE — 85014 HEMATOCRIT: CPT | Performed by: INTERNAL MEDICINE

## 2024-09-07 PROCEDURE — 250N000013 HC RX MED GY IP 250 OP 250 PS 637: Performed by: INTERNAL MEDICINE

## 2024-09-07 PROCEDURE — 93005 ELECTROCARDIOGRAM TRACING: CPT

## 2024-09-07 PROCEDURE — 250N000013 HC RX MED GY IP 250 OP 250 PS 637

## 2024-09-07 PROCEDURE — 258N000003 HC RX IP 258 OP 636: Performed by: INTERNAL MEDICINE

## 2024-09-07 RX ORDER — SPIRONOLACTONE 25 MG/1
25 TABLET ORAL EVERY MORNING
Status: DISCONTINUED | OUTPATIENT
Start: 2024-09-08 | End: 2024-09-11 | Stop reason: HOSPADM

## 2024-09-07 RX ORDER — FUROSEMIDE 20 MG
20 TABLET ORAL
Status: DISCONTINUED | OUTPATIENT
Start: 2024-09-07 | End: 2024-09-11 | Stop reason: HOSPADM

## 2024-09-07 RX ORDER — SIMETHICONE 40MG/0.6ML
40 SUSPENSION, DROPS(FINAL DOSAGE FORM)(ML) ORAL EVERY 6 HOURS PRN
Status: DISCONTINUED | OUTPATIENT
Start: 2024-09-07 | End: 2024-09-08

## 2024-09-07 RX ADMIN — GABAPENTIN 100 MG: 100 CAPSULE ORAL at 21:26

## 2024-09-07 RX ADMIN — ROPINIROLE HYDROCHLORIDE 0.5 MG: 0.25 TABLET, FILM COATED ORAL at 09:11

## 2024-09-07 RX ADMIN — HYDROMORPHONE HYDROCHLORIDE 0.4 MG: 0.2 INJECTION, SOLUTION INTRAMUSCULAR; INTRAVENOUS; SUBCUTANEOUS at 10:42

## 2024-09-07 RX ADMIN — POTASSIUM CHLORIDE 20 MEQ: 1500 TABLET, EXTENDED RELEASE ORAL at 21:26

## 2024-09-07 RX ADMIN — MAGNESIUM OXIDE TAB 400 MG (241.3 MG ELEMENTAL MG) 400 MG: 400 (241.3 MG) TAB at 09:11

## 2024-09-07 RX ADMIN — FUROSEMIDE 20 MG: 20 TABLET ORAL at 17:49

## 2024-09-07 RX ADMIN — HYDRALAZINE HYDROCHLORIDE 10 MG: 20 INJECTION INTRAMUSCULAR; INTRAVENOUS at 10:41

## 2024-09-07 RX ADMIN — PIPERACILLIN AND TAZOBACTAM 3.38 G: 3; .375 INJECTION, POWDER, FOR SOLUTION INTRAVENOUS at 16:35

## 2024-09-07 RX ADMIN — SIMVASTATIN 40 MG: 10 TABLET, FILM COATED ORAL at 21:26

## 2024-09-07 RX ADMIN — CITALOPRAM HYDROBROMIDE 40 MG: 20 TABLET ORAL at 12:50

## 2024-09-07 RX ADMIN — HYDROMORPHONE HYDROCHLORIDE 0.4 MG: 0.2 INJECTION, SOLUTION INTRAMUSCULAR; INTRAVENOUS; SUBCUTANEOUS at 21:23

## 2024-09-07 RX ADMIN — Medication 3 MG: at 21:26

## 2024-09-07 RX ADMIN — GABAPENTIN 100 MG: 100 CAPSULE ORAL at 09:11

## 2024-09-07 RX ADMIN — PIPERACILLIN AND TAZOBACTAM 3.38 G: 3; .375 INJECTION, POWDER, FOR SOLUTION INTRAVENOUS at 23:37

## 2024-09-07 RX ADMIN — SODIUM CHLORIDE 1500 MG: 9 INJECTION, SOLUTION INTRAVENOUS at 09:47

## 2024-09-07 RX ADMIN — ROPINIROLE HYDROCHLORIDE 1 MG: 1 TABLET, FILM COATED ORAL at 21:27

## 2024-09-07 RX ADMIN — SIMETHICONE 40 MG: 20 SUSPENSION/ DROPS ORAL at 19:53

## 2024-09-07 RX ADMIN — PIPERACILLIN AND TAZOBACTAM 3.38 G: 3; .375 INJECTION, POWDER, FOR SOLUTION INTRAVENOUS at 09:11

## 2024-09-07 RX ADMIN — HEPARIN SODIUM 5000 UNITS: 10000 INJECTION, SOLUTION INTRAVENOUS; SUBCUTANEOUS at 21:27

## 2024-09-07 RX ADMIN — HEPARIN SODIUM 5000 UNITS: 10000 INJECTION, SOLUTION INTRAVENOUS; SUBCUTANEOUS at 14:28

## 2024-09-07 RX ADMIN — GABAPENTIN 100 MG: 100 CAPSULE ORAL at 14:27

## 2024-09-07 RX ADMIN — PANTOPRAZOLE SODIUM 40 MG: 40 TABLET, DELAYED RELEASE ORAL at 09:11

## 2024-09-07 RX ADMIN — ASPIRIN 81 MG CHEWABLE TABLET 81 MG: 81 TABLET CHEWABLE at 09:11

## 2024-09-07 RX ADMIN — BUPROPION HYDROCHLORIDE 150 MG: 150 TABLET, FILM COATED, EXTENDED RELEASE ORAL at 09:11

## 2024-09-07 RX ADMIN — WHITE PETROLATUM: 1.75 OINTMENT TOPICAL at 12:50

## 2024-09-07 RX ADMIN — HEPARIN SODIUM 5000 UNITS: 10000 INJECTION, SOLUTION INTRAVENOUS; SUBCUTANEOUS at 05:20

## 2024-09-07 RX ADMIN — PIPERACILLIN AND TAZOBACTAM 3.38 G: 3; .375 INJECTION, POWDER, FOR SOLUTION INTRAVENOUS at 00:23

## 2024-09-07 RX ADMIN — HYDROMORPHONE HYDROCHLORIDE 0.4 MG: 0.2 INJECTION, SOLUTION INTRAMUSCULAR; INTRAVENOUS; SUBCUTANEOUS at 14:27

## 2024-09-07 RX ADMIN — HYDROMORPHONE HYDROCHLORIDE 0.4 MG: 0.2 INJECTION, SOLUTION INTRAMUSCULAR; INTRAVENOUS; SUBCUTANEOUS at 00:20

## 2024-09-07 ASSESSMENT — ACTIVITIES OF DAILY LIVING (ADL)
ADLS_ACUITY_SCORE: 32
ADLS_ACUITY_SCORE: 28
ADLS_ACUITY_SCORE: 32
ADLS_ACUITY_SCORE: 32
ADLS_ACUITY_SCORE: 28
ADLS_ACUITY_SCORE: 32
ADLS_ACUITY_SCORE: 28
ADLS_ACUITY_SCORE: 32

## 2024-09-07 NOTE — PLAN OF CARE
Problem: Adult Inpatient Plan of Care  Goal: Plan of Care Review  Description: The Plan of Care Review/Shift note should be completed every shift.  The Outcome Evaluation is a brief statement about your assessment that the patient is improving, declining, or no change.  This information will be displayed automatically on your shift  note.  Outcome: Progressing     Problem: Adult Inpatient Plan of Care  Goal: Absence of Hospital-Acquired Illness or Injury  Intervention: Prevent Skin Injury  Recent Flowsheet Documentation  Taken 9/7/2024 0915 by Henrique Granda RN  Skin Protection: (Interdry in skin folds)   adhesive use limited   other (see comments)  Device Skin Pressure Protection: absorbent pad utilized/changed     Problem: Comorbidity Management  Goal: Blood Pressure in Desired Range  Outcome: Not Progressing  Intervention: Maintain Blood Pressure Management  Recent Flowsheet Documentation  Taken 9/7/2024 0915 by Henrique Granda RN  Medication Review/Management: medications reviewed     Problem: Skin or Soft Tissue Infection  Goal: Absence of Infection Signs and Symptoms  Intervention: Minimize and Manage Infection Progression  Recent Flowsheet Documentation  Taken 9/7/2024 0915 by Henrique Granda RN  Infection Prevention:   hand hygiene promoted   personal protective equipment utilized   rest/sleep promoted   single patient room provided  Isolation Precautions: contact precautions maintained    Patient is alert and orientated, able to make needs known. Up with the assist of 1 and walker. Positive for a bowel movement. Worked with OT/PT. Blood pressure elevated throughout shift, administered PRN hydralazine with moderate effect. EKG and Chest xray were unremarkable. No further complaints of chest pain or heaviness. Dressing to RLE completed today, wounds appear to be slightly improving. Administered PRN diluadid 0.4 mg for severe pain to RLE with desired effect. Continues of contact isolation for  MRSA    Henrique Granda RN

## 2024-09-07 NOTE — PROGRESS NOTES
[facility]  Infectious Disease   Progress Note     Date of Admission:  9/4/2024    Assessment & Plan   right lower extremity recurrent cellulitis  Prior one was in July   risk factors obesity, lymphedema with open wounds,  Venous insufficiency  Leukocytosis improved 21K to 14 K to 7 K  Fever resolved quickly        PLAN  Discontinue vancomycin  Keep Zosyn for now  Switch to PO CIPRO once susceptibilities out if susceptible x 5 days  Leg elevation  Wound care  Not a candidate yet for long term suppression   She would like to follow with dr Vargas who has seen in the past      Blayne Parham M.D.    ______________________________________________________________________    Interval History   She was worried emanuel she had chest tightness with high blood pressure, addressed by hospitalist      Past medical, family, and social history reviewed, unchanged from before.  All 12 systems reviewed, negative except for the above.      Physical Exam   Vital Signs: Temp: 98.3  F (36.8  C) Temp src: Oral BP: (!) 163/70 Pulse: 80   Resp: 18 SpO2: 94 % O2 Device: None (Room air) Oxygen Delivery: 1 LPM  Weight: 0 lbs 0 oz  \Gen. appearance nontoxic  Eyes no conjunctivitis or icterus  Neck no stiffness   Heartedema  Lungs no sob  Abdomen soft not tender  Extremities no synovitis,RLE massive lymphedema  And erythema mostly below knee , better  Above knee erythema faded>resolved  Superificila wounds, lots of weeping  Skin  no rash or emboli  Neurologic alert oriented no focal deficits       Data   Results for orders placed or performed during the hospital encounter of 09/04/24 (from the past 24 hour(s))   Creatinine   Result Value Ref Range    Creatinine 0.90 0.51 - 0.95 mg/dL    GFR Estimate 66 >60 mL/min/1.73m2   CBC with platelets   Result Value Ref Range    WBC Count 7.4 4.0 - 11.0 10e3/uL    RBC Count 3.14 (L) 3.80 - 5.20 10e6/uL    Hemoglobin 8.2 (L) 11.7 - 15.7 g/dL    Hematocrit 27.7 (L) 35.0 - 47.0 %     MCV 88 78 - 100 fL    MCH 26.1 (L) 26.5 - 33.0 pg    MCHC 29.6 (L) 31.5 - 36.5 g/dL    RDW 14.8 10.0 - 15.0 %    Platelet Count 321 150 - 450 10e3/uL   Glucose by meter   Result Value Ref Range    GLUCOSE BY METER POCT 103 (H) 70 - 99 mg/dL   ECG 12-LEAD WITH MUSE (LHE)   Result Value Ref Range    Systolic Blood Pressure  mmHg    Diastolic Blood Pressure  mmHg    Ventricular Rate 77 BPM    Atrial Rate 77 BPM    NJ Interval 156 ms    QRS Duration 92 ms     ms    QTc 448 ms    P Axis 57 degrees    R AXIS -3 degrees    T Axis 33 degrees    Interpretation ECG       Sinus rhythm  Normal ECG  When compared with ECG of 04-Sep-2024 10:27,  Nonspecific T wave abnormality no longer evident in Lateral leads     XR Chest Port 1 View    Narrative    EXAM: XR CHEST PORT 1 VIEW  LOCATION: [facility]  DATE: 9/7/2024    INDICATION: increased cough and chest tightness  COMPARISON: Chest radiograph 9/4/2024.       Impression    IMPRESSION:     Slightly elevated right hemidiaphragm. No evidence of pneumonia. No pleural effusions or pneumothorax. Normal pulmonary vascularity.     Stable, nonenlarged cardiac silhouette. Moderate hiatal hernia.    Degenerative changes of both shoulders.

## 2024-09-07 NOTE — SIGNIFICANT EVENT
Respiratory Therapy Note    Responded to RRT. Patient with new onset of chest pain. Respiratory Therapy assistance not required and was excused from patient's room.    Adriana Tinoco, ALBARO, RRT, CTTS

## 2024-09-07 NOTE — PROGRESS NOTES
09/07/24 0745   Appointment Info   Signing Clinician's Name / Credentials (OT) Morena Mcgarry OTD OTR/L   Living Environment   People in Home significant other   Current Living Arrangements house   Home Accessibility stairs to enter home   Number of Stairs, Main Entrance 5   Living Environment Comments Lives on main level, has tub shower and standard toilets   Self-Care   Equipment Currently Used at Home cane, straight;walker, rolling   Activity/Exercise/Self-Care Comment Ind with all ADLs and most IADLs (except laundry) has been sponge bathing outside of tub d/t lymphedema needs, has lymphedema nurse 3x/wk for wraps/wound care   General Information   Onset of Illness/Injury or Date of Surgery 09/04/24   Referring Physician Ketty De Santiago MD   Patient/Family Therapy Goal Statement (OT) To return home   Additional Occupational Profile Info/Pertinent History of Current Problem 79 year old female with history of hronic wound on her right lower extremity, lymphedema of bilateral lower extremities, GERD, HTN, HLD, MS, restless legs, morbid obesity and mood disorder who presents 9/4/24 for evaluation of fever and generalized weakness and found to have sepsis related to RLE cellulitis. Lower extremity venous ultrasound was negative for deep venous thrombosis.   Existing Precautions/Restrictions no known precautions/restrictions   Cognitive Status Examination   Orientation Status orientation to person, place and time   Affect/Mental Status (Cognitive) WFL   Follows Commands WFL   Visual Perception   Visual Impairment/Limitations corrective lenses full-time   Posture   Posture not impaired   Range of Motion Comprehensive   General Range of Motion no range of motion deficits identified   Strength Comprehensive (MMT)   General Manual Muscle Testing (MMT) Assessment no strength deficits identified   Bed Mobility   Bed Mobility supine-sit   Supine-Sit [place] (Bed Mobility) supervision   Assistive Device (Bed Mobility)  bed rails   Transfers   Transfers sit-stand transfer;toilet transfer   Sit-Stand Transfer   Sit-Stand [place] (Transfers) supervision   Toilet Transfer   [place] Level (Toilet Transfer) supervision   Balance   Balance Assessment standing dynamic balance   Activities of Daily Living   BADL Assessment/Intervention lower body dressing;toileting;grooming   Lower Body Dressing Assessment/Training   [place] Level (Lower Body Dressing) maximum assist (25% patient effort)   Grooming Assessment/Training   [place] Level (Grooming) contact guard assist   Toileting   [place] Level (Toileting) moderate assist (50% patient effort)   Clinical Impression   Criteria for Skilled Therapeutic Interventions Met (OT) Yes, treatment indicated   OT Diagnosis Decreased ind with ADLs   Influenced by the following impairments Cellulitis   OT Problem List-Impairments impacting ADL activity tolerance impaired;balance;mobility   Assessment of Occupational Performance 1-3 Performance Deficits   Identified Performance Deficits activity tolerance, toileting, dressing   Planned Therapy Interventions (OT) ADL retraining;progressive activity/exercise;risk factor education   Clinical Decision Making Complexity (OT) problem focused assessment/low complexity   Risk & Benefits of therapy have been explained evaluation/treatment results reviewed;care plan/treatment goals reviewed;risks/benefits reviewed;current/potential barriers reviewed;patient   OT Total Evaluation Time   OT Eval, Low Complexity Minutes (45876)   (OT eval previously billed during admission)   OT Goals   Therapy Frequency (OT) 6 times/week  (Daily lymph)   OT Predicted Duration/Target Date for Goal Attainment 09/14/24   OT Goals Toilet Transfer/Toileting;Lower Body Dressing;Aerobic Activity   OT: Lower Body Dressing Modified independent;using adaptive equipment   OT: Toilet Transfer/Toileting Modified independent;toilet transfer;cleaning and garment management    OT: Perform aerobic activity with stable cardiovascular response intermittent activity;10 minutes   Self-Care/Home Management   Self-Care/Home Mgmt/ADL, Compensatory, Meal Prep Minutes (38393) 24   Treatment Detail/Skilled Intervention Eval completed, treatment initiated. Fxl mob to bathroom SBA with no AD, intermittently reaching for solid surfaces in room, no LOB noted. Toilet transfer SBA, incidental mod A provided for garment mgmt (use of tab brief d/t sizing). Able to complete pericares, max A for posterior pericares, discussed options for increased ease with posterior pericares d/t body habitus. G/h standing at sink SBA ~ 10 minutes no LOB noted, use of sink ledge for stability. Returned to bedside chair, multiple transfers to adjust linens SBA, min cueing for hand placement/safety. Left in chair with RN present, call light in reach.   Symptoms Noted During/After Treatment (Meal Preparation/Planning Training) none   OT Discharge Planning   OT Plan Posterior pericares/toileting, LB dressing (AE as needed), UE exer, standing tolerance for IADLs   OT Discharge Recommendation (DC Rec) home with assist   OT Rationale for DC Rec Pt mobilizing well, close to baseline when pain controlled. Has assist from S/O and daughter as needed   OT Brief overview of current status SBA transfers and stand g/h, max A pericares   Total Session Time   Timed Code Treatment Minutes 24   Total Session Time (sum of timed and untimed services) 24

## 2024-09-07 NOTE — PLAN OF CARE
Problem: Adult Inpatient Plan of Care  Goal: Plan of Care Review  Description: The Plan of Care Review/Shift note should be completed every shift.  The Outcome Evaluation is a brief statement about your assessment that the patient is improving, declining, or no change.  This information will be displayed automatically on your shift  note.  9/6/2024 2203 by Henrique Granda RN  Outcome: Progressing     Problem: Adult Inpatient Plan of Care  Goal: Optimal Comfort and Wellbeing  9/6/2024 2203 by Henrique Granda RN  Outcome: Progressing    Patient is alert and orientated, able to make needs known. Reported severe pain to her RLE, administered PRN hydromorphone 0.4 mg with desired effect. Continues on IV zosyn and vancomycin.     Henrique Granda RN

## 2024-09-07 NOTE — PROGRESS NOTES
[facility]    Medicine Progress Note - Hospitalist Service    Date of Admission:  9/4/2024    Assessment & Plan   Elizabeth Kelley is 79 year old female with history of chronic right lower extremity venous ulcer, lymphedema of bilateral lower extremities, GERD, HTN, HLD, multiple sclerosis, restless legs, morbid obesity and depression admitted on 9/4/24 for evaluation of fever and generalized weakness and found to have sepsis related to RLE cellulitis. Lower extremity venous ultrasound was negative for deep venous thrombosis.  ID service is assisting with antibiotic therapy.    Sepsis  Cellulitis of right lower extremity  Chronic RLE ulcers   --Off IV vancomycin  --Continue IV Zosyn per ID recommendation with plan to switch to oral ciprofloxacin depending on culture results.  --Analgesics as needed  --Hold further IVF given chronic lymphedema and stable hemodynamics  --Hold home lasix and spironolactone for now  --Follow up blood cultures  --Follow-up with wound swab  -- Wound care per wound care nurse  --ID [id number] assistance       Mild hypoxia on admission  CXR on admission shows shallow inspiration, normal vascularity, and likely volume loss RLL with elevation of right hemidiaphragm  -- Incentive spirometry  -- Supplemental oxygen as needed       KIKE  Resolved  -- Resume PTA spirolactone and lasix  -- Off normal saline at 75 mL/h  --Monitor BMP  --Avoid nephrotoxins    Chronic hypokalemia and hypomagnesemia  -- continue home replacements  -- trend K and Mag in AM    Generalized Weakness  Multiple sclerosis  -- PT/OT  --Outpatient neurology follow-up     Anemia, chronic  Hemoglobin is stable  -- continue home ferrous sulfate  --Monitor hemoglobin and transfuse as needed     Lymphedema of bilateral lower extremities  Neuropathy   Restless leg syndrome  --Continue home gabapentin and ropinirole  -- lymphedema therapy consult       Hypertension  -- Resume PTA spironolactone and  "furosemide      Hyperlipidemia  -- continue home simvastatin       Depression  -- continue home Buprion and citalopram       GERD  --Continue home PPI    DVT Prophylaxis: Heparin SQ  Amato Catheter: Not present  Lines: PRESENT             Cardiac Monitoring: None  Code Status: Full Code      Diet: Regular Diet Adult    DVT Prophylaxis: Heparin SQ  Amato Catheter: Not present  Lines: None     Cardiac Monitoring: None  Code Status: Full Code      Clinically Significant Risk Factors              # Hypoalbuminemia: Lowest albumin = 3.2 g/dL at 9/4/2024 10:47 AM, will monitor as appropriate     # Hypertension: Noted on problem list        # Severe Obesity: Estimated body mass index is 47.05 kg/m  as calculated from the following:    Height as of an earlier encounter on 9/4/24: 1.549 m (5' 1\").    Weight as of an earlier encounter on 9/4/24: 112.9 kg (249 lb)., PRESENT ON ADMISSION       # Financial/Environmental Concerns: none      Disposition Plan     Medically Ready for Discharge: Anticipated in 2-4 Days      Ketty De Santiago MD  Hospitalist Service  [facility]  Securely message with [employer] (more info)  Text page via [employer] Paging/Directory   ______________________________________________________________________    Interval History   RRT was activated this morning after patient complained of chest pressure while sitting on the recliner.  Upon evaluation, patient states symptoms have resolved.EKG and chest radiograph were unremarkable. Breathing comfortably on room air. Hemodynamically stable.     Physical Exam   Vital Signs: Temp: 98.3  F (36.8  C) Temp src: Oral BP: (!) 163/70 Pulse: 80   Resp: 18 SpO2: 94 % O2 Device: None (Room air) Oxygen Delivery: 1 LPM  Weight: 0 lbs 0 oz    General appearance: Obese, awake, Alert, Cooperative, not in any obvious distress and appears stated age   HEENT: Normocephalic, atraumatic, conjunctiva clear without icterus and ears without discharge  Lungs: Clear " to auscultation bilaterally, no wheezing, good air exchange, normal work of breathing  Cardiovascular: Regular Rate and Rythm, normal apical impulse, normal S1 and S2, no lower extremity edema bilaterally  Abdomen: Soft, non-tender and Non-distended, active bowel sounds  Skin: Extensive superficial ulcers in the right leg with sloughs and greenish soakage on the wound dressing.  States dermatitis of the left lower extremity.  Musculoskeletal: No bony deformities or joint tenderness. Normal ROM upon flexion & extension.   Neurologic: Alert & Oriented X 3, Facial symmetry preserved and upper & lower extremities moving well with symmetry  Psychiatric: Calm, normal eye contact and normal affect      Medical Decision Making       40 MINUTES SPENT BY ME on the date of service doing chart review, history, exam, documentation & further activities per the note.      Data     I have personally reviewed the following data over the past 24 hrs:    7.4  \   8.2 (L)   / 321     N/A N/A N/A /  103 (H)   N/A N/A 0.90 \       Imaging results reviewed over the past 24 hrs:   Recent Results (from the past 24 hour(s))   XR Chest Port 1 View    Narrative    EXAM: XR CHEST PORT 1 VIEW  LOCATION: [facility]  DATE: 9/7/2024    INDICATION: increased cough and chest tightness  COMPARISON: Chest radiograph 9/4/2024.       Impression    IMPRESSION:     Slightly elevated right hemidiaphragm. No evidence of pneumonia. No pleural effusions or pneumothorax. Normal pulmonary vascularity.     Stable, nonenlarged cardiac silhouette. Moderate hiatal hernia.    Degenerative changes of both shoulders.

## 2024-09-07 NOTE — PLAN OF CARE
Problem: Adult Inpatient Plan of Care  Goal: Optimal Comfort and Wellbeing  Outcome: Progressing  Intervention: Monitor Pain and Promote Comfort  Recent Flowsheet Documentation  Taken 9/7/2024 0020 by Pavithra Henriquez RN  Pain Management Interventions: medication (see MAR)   Goal Outcome Evaluation: RLE pain controlled with prn hydromorphone.     Problem: Risk for Delirium  Goal: Improved Sleep  Outcome: Progressing  Patient sleeping quietly most of shift.    IV abx given as ordered. O2 2L during sleep.

## 2024-09-07 NOTE — SIGNIFICANT EVENT
Significant Event Note    Time of event: 9:44 AM     Description of event:  Responded to rapid response on patient for new chest pain    On my arrival, Dr. De Santiago the Hospitalist was managing and did not require my assistance.    Discussed with: Hospitalist    Juan Barragan MD      Plan:  CP   - EKG    Discussed with: Dr. Anyi Barragan MD

## 2024-09-07 NOTE — PROGRESS NOTES
At this time, patient complained of chest heaviness. BP = 197/77. Activated RRT. STAT EKG and STAT Chest x-ray ordered.     Henrique Granda RN

## 2024-09-08 ENCOUNTER — APPOINTMENT (OUTPATIENT)
Dept: OCCUPATIONAL THERAPY | Facility: HOSPITAL | Age: 79
DRG: 872 | End: 2024-09-08
Attending: INTERNAL MEDICINE
Payer: COMMERCIAL

## 2024-09-08 ENCOUNTER — APPOINTMENT (OUTPATIENT)
Dept: PHYSICAL THERAPY | Facility: HOSPITAL | Age: 79
DRG: 872 | End: 2024-09-08
Attending: INTERNAL MEDICINE
Payer: COMMERCIAL

## 2024-09-08 LAB
BACTERIA WND CULT: ABNORMAL
GRAM STAIN RESULT: ABNORMAL
GRAM STAIN RESULT: ABNORMAL

## 2024-09-08 PROCEDURE — 250N000011 HC RX IP 250 OP 636: Performed by: INTERNAL MEDICINE

## 2024-09-08 PROCEDURE — 99232 SBSQ HOSP IP/OBS MODERATE 35: CPT | Performed by: INTERNAL MEDICINE

## 2024-09-08 PROCEDURE — 272N000450 HC KIT 4FR POWER PICC SINGLE LUMEN

## 2024-09-08 PROCEDURE — 250N000013 HC RX MED GY IP 250 OP 250 PS 637: Performed by: INTERNAL MEDICINE

## 2024-09-08 PROCEDURE — 120N000001 HC R&B MED SURG/OB

## 2024-09-08 PROCEDURE — 250N000009 HC RX 250: Performed by: INTERNAL MEDICINE

## 2024-09-08 PROCEDURE — 97535 SELF CARE MNGMENT TRAINING: CPT | Mod: GO

## 2024-09-08 PROCEDURE — 36569 INSJ PICC 5 YR+ W/O IMAGING: CPT

## 2024-09-08 PROCEDURE — 97116 GAIT TRAINING THERAPY: CPT | Mod: GP | Performed by: PHYSICAL THERAPIST

## 2024-09-08 RX ORDER — SIMETHICONE 80 MG
80 TABLET,CHEWABLE ORAL EVERY 6 HOURS PRN
Status: DISCONTINUED | OUTPATIENT
Start: 2024-09-08 | End: 2024-09-11 | Stop reason: HOSPADM

## 2024-09-08 RX ORDER — LIDOCAINE 40 MG/G
CREAM TOPICAL
Status: ACTIVE | OUTPATIENT
Start: 2024-09-08 | End: 2024-09-11

## 2024-09-08 RX ORDER — PIPERACILLIN SODIUM, TAZOBACTAM SODIUM 3; .375 G/15ML; G/15ML
3.38 INJECTION, POWDER, LYOPHILIZED, FOR SOLUTION INTRAVENOUS EVERY 8 HOURS
Status: COMPLETED | OUTPATIENT
Start: 2024-09-08 | End: 2024-09-08

## 2024-09-08 RX ORDER — MEROPENEM 1 G/1
1 INJECTION, POWDER, FOR SOLUTION INTRAVENOUS EVERY 8 HOURS
Status: DISCONTINUED | OUTPATIENT
Start: 2024-09-08 | End: 2024-09-11 | Stop reason: HOSPADM

## 2024-09-08 RX ADMIN — ROPINIROLE HYDROCHLORIDE 0.5 MG: 0.25 TABLET, FILM COATED ORAL at 08:50

## 2024-09-08 RX ADMIN — HEPARIN SODIUM 5000 UNITS: 10000 INJECTION, SOLUTION INTRAVENOUS; SUBCUTANEOUS at 13:50

## 2024-09-08 RX ADMIN — GABAPENTIN 100 MG: 100 CAPSULE ORAL at 08:51

## 2024-09-08 RX ADMIN — HYDROMORPHONE HYDROCHLORIDE 0.4 MG: 0.2 INJECTION, SOLUTION INTRAMUSCULAR; INTRAVENOUS; SUBCUTANEOUS at 22:54

## 2024-09-08 RX ADMIN — MAGNESIUM OXIDE TAB 400 MG (241.3 MG ELEMENTAL MG) 400 MG: 400 (241.3 MG) TAB at 08:51

## 2024-09-08 RX ADMIN — FUROSEMIDE 20 MG: 20 TABLET ORAL at 08:51

## 2024-09-08 RX ADMIN — ASPIRIN 81 MG CHEWABLE TABLET 81 MG: 81 TABLET CHEWABLE at 08:51

## 2024-09-08 RX ADMIN — PANTOPRAZOLE SODIUM 40 MG: 40 TABLET, DELAYED RELEASE ORAL at 08:51

## 2024-09-08 RX ADMIN — SIMVASTATIN 40 MG: 10 TABLET, FILM COATED ORAL at 20:00

## 2024-09-08 RX ADMIN — ROPINIROLE HYDROCHLORIDE 1 MG: 0.25 TABLET, FILM COATED ORAL at 15:25

## 2024-09-08 RX ADMIN — ROPINIROLE HYDROCHLORIDE 1 MG: 1 TABLET, FILM COATED ORAL at 20:00

## 2024-09-08 RX ADMIN — ACETAMINOPHEN 650 MG: 325 TABLET ORAL at 12:12

## 2024-09-08 RX ADMIN — PIPERACILLIN AND TAZOBACTAM 3.38 G: 3; .375 INJECTION, POWDER, FOR SOLUTION INTRAVENOUS at 15:16

## 2024-09-08 RX ADMIN — GABAPENTIN 100 MG: 100 CAPSULE ORAL at 20:00

## 2024-09-08 RX ADMIN — GABAPENTIN 100 MG: 100 CAPSULE ORAL at 13:50

## 2024-09-08 RX ADMIN — POTASSIUM CHLORIDE 20 MEQ: 1500 TABLET, EXTENDED RELEASE ORAL at 20:00

## 2024-09-08 RX ADMIN — WHITE PETROLATUM: 1.75 OINTMENT TOPICAL at 08:54

## 2024-09-08 RX ADMIN — HYDROMORPHONE HYDROCHLORIDE 1 MG: 2 TABLET ORAL at 20:00

## 2024-09-08 RX ADMIN — FUROSEMIDE 20 MG: 20 TABLET ORAL at 18:47

## 2024-09-08 RX ADMIN — HEPARIN SODIUM 5000 UNITS: 10000 INJECTION, SOLUTION INTRAVENOUS; SUBCUTANEOUS at 05:13

## 2024-09-08 RX ADMIN — SPIRONOLACTONE 25 MG: 25 TABLET, FILM COATED ORAL at 08:51

## 2024-09-08 RX ADMIN — BUPROPION HYDROCHLORIDE 150 MG: 150 TABLET, FILM COATED, EXTENDED RELEASE ORAL at 08:51

## 2024-09-08 RX ADMIN — CIPROFLOXACIN HYDROCHLORIDE 750 MG: 250 TABLET, FILM COATED ORAL at 14:56

## 2024-09-08 RX ADMIN — HYDROMORPHONE HYDROCHLORIDE 1 MG: 2 TABLET ORAL at 14:21

## 2024-09-08 RX ADMIN — HYDROMORPHONE HYDROCHLORIDE 0.4 MG: 0.2 INJECTION, SOLUTION INTRAMUSCULAR; INTRAVENOUS; SUBCUTANEOUS at 02:40

## 2024-09-08 RX ADMIN — CITALOPRAM HYDROBROMIDE 40 MG: 20 TABLET ORAL at 12:12

## 2024-09-08 RX ADMIN — LIDOCAINE HYDROCHLORIDE 2.5 ML: 10 INJECTION, SOLUTION EPIDURAL; INFILTRATION; INTRACAUDAL; PERINEURAL at 14:37

## 2024-09-08 RX ADMIN — HEPARIN SODIUM 5000 UNITS: 10000 INJECTION, SOLUTION INTRAVENOUS; SUBCUTANEOUS at 21:23

## 2024-09-08 RX ADMIN — MEROPENEM 1 G: 1 INJECTION, POWDER, FOR SOLUTION INTRAVENOUS at 21:23

## 2024-09-08 ASSESSMENT — ACTIVITIES OF DAILY LIVING (ADL)
ADLS_ACUITY_SCORE: 33
ADLS_ACUITY_SCORE: 32
ADLS_ACUITY_SCORE: 33
ADLS_ACUITY_SCORE: 32
ADLS_ACUITY_SCORE: 33
ADLS_ACUITY_SCORE: 32
ADLS_ACUITY_SCORE: 34
ADLS_ACUITY_SCORE: 33
ADLS_ACUITY_SCORE: 32
ADLS_ACUITY_SCORE: 33
ADLS_ACUITY_SCORE: 32
ADLS_ACUITY_SCORE: 32
ADLS_ACUITY_SCORE: 33
ADLS_ACUITY_SCORE: 32
ADLS_ACUITY_SCORE: 32
ADLS_ACUITY_SCORE: 33
ADLS_ACUITY_SCORE: 32

## 2024-09-08 NOTE — PROGRESS NOTES
[facility]  Infectious Disease Progress Note     Date of Admission:  9/4/2024    Chart reviewed on 9/8/2024  Patient seen and updated    Assessment & Plan   right lower extremity recurrent cellulitis- pseudomonas  Sepsis- improving  Prior cellulitis episode was in July   risk factors obesity, lymphedema with open wounds,  Venous insufficiency  Leukocytosis improved 21K to 14 K to 7 K  Fever resolved quickly        PLAN  Discontinue vancomycin  Keep Zosyn one more dose-- in room and patient awaiting IV placement/PICC placement  PICC  Ciprofloxacin one time bridging dose as patient without IV access and last dose of zosyn could not be administered  Meropenem starting tonight-- Pharm D to dose/schedule based on last dose of IV zosyn  May need IV abx on discharge  Leg elevation  Wound care  Please see previous ID notes by Blayne Parham M.D.  Updated patient's nurse  Updated patient    Kateh Vargas MD  [facility]  Answering Service: [phone]  On-Call ID provider: [phone], option: 9    ______________________________________________________________________    Interval History   IV infiltrated- PICC ordered  Dressing change with nurse-- painful  Healing    Previous note: She was worried emanuel she had chest tightness with high blood pressure, addressed by hospitalist      Past medical, family, and social history reviewed, unchanged from before.  All 12 systems reviewed, negative except for the above.      Physical Exam   Vital Signs: Temp: 98.7  F (37.1  C) Temp src: Oral BP: 129/61 Pulse: 70   Resp: 16 SpO2: 99 % O2 Device: None (Room air)    Weight: 0 lbs 0 oz  \Gen. appearance nontoxic  Eyes no conjunctivitis or icterus  Neck no stiffness   Heartedema  Lungs no sob  Abdomen soft not tender  Extremities no synovitis,RLE massive lymphedema  And erythema mostly below knee , better  Above knee erythema faded>resolved  Superificila wounds, lots of weeping  Skin   erythema, superficial ulceration  Neurologic alert oriented no focal deficits  Previous notes             Data   Results for orders placed or performed during the hospital encounter of 09/04/24 (from the past 24 hour(s))   Nt probnp inpatient   Result Value Ref Range    N terminal Pro BNP Inpatient 605 0 - 1,800 pg/mL   CT Chest w/o Contrast    Narrative    EXAM: CT CHEST W/O CONTRAST  LOCATION: [facility]  DATE: 9/7/2024    INDICATION: Chest discomfort, hypoxia on admission? Infectious process versus pulmonary edema.  COMPARISON: None.  TECHNIQUE: CT chest without IV contrast. Multiplanar reformats were obtained. Dose reduction techniques were used.  CONTRAST: None.    FINDINGS:   LUNGS AND PLEURA: Trace atelectasis and/or fibrosis. No consolidation or effusion. Mild-moderate bronchial wall thickening and bronchiectasis.    MEDIASTINUM/AXILLAE: Small to moderate hiatal hernia. Mildly enlarged anterior mediastinal nodes with representative node measuring 2.4 x 1.0 cm. Supraclavicular node measuring 1.4 x 1.9 cm on the left. No aneurysm.    CORONARY ARTERY CALCIFICATION: None.    UPPER ABDOMEN: No significant finding.    MUSCULOSKELETAL: No frankly destructive bony lesions.      Impression    IMPRESSION:   1.  Mild-moderate bronchial wall thickening and bronchiectasis.  2.  Mild nonspecific adenopathy.  3.  Small to moderate hiatal hernia.         Medical Decision Making       MANAGEMENT DISCUSSED with the following over the past 24 hours: patient, nurse   NOTE(S)/MEDICAL RECORDS REVIEWED over the past 24 hours: reviewed  Tests ORDERED & REVIEWED in the past 24 hours:  - See lab/imaging results included in the data section of the note  Medical complexity over the past 24 hours:  - Intensive monitoring for MEDICATION TOXICITY  - Decision regarding ESCALATION OF LEVEL OF CARE

## 2024-09-08 NOTE — PLAN OF CARE
Problem: Adult Inpatient Plan of Care  Goal: Absence of Hospital-Acquired Illness or Injury  Outcome: Progressing  Intervention: Identify and Manage Fall Risk  Recent Flowsheet Documentation  Taken 9/7/2024 2337 by Speedy Ortiz, RN  Safety Promotion/Fall Prevention:   activity supervised   nonskid shoes/slippers when out of bed   mobility aid in reach   increase visualization of patient   increased rounding and observation  Intervention: Prevent Skin Injury  Recent Flowsheet Documentation  Taken 9/7/2024 2337 by Speedy Ortiz, RN  Body Position: position changed independently  Skin Protection: (Interdry in skin folds)   adhesive use limited   other (see comments)  Device Skin Pressure Protection: absorbent pad utilized/changed  Intervention: Prevent Infection  Recent Flowsheet Documentation  Taken 9/7/2024 2337 by Speedy Ortiz, RN  Infection Prevention:   hand hygiene promoted   personal protective equipment utilized   rest/sleep promoted   single patient room provided     Problem: Comorbidity Management  Goal: Blood Pressure in Desired Range  Outcome: Progressing  Intervention: Maintain Blood Pressure Management  Recent Flowsheet Documentation  Taken 9/7/2024 2337 by Speedy Ortiz RN  Medication Review/Management: medications reviewed   Goal Outcome Evaluation:       A&Ox4. Able to make needs known. Has 7/10 RLE pain, gave IV dilaudid. RLE red and peeling. Lymphedema wraps on LLE. Requested to put in a purewick. Did not want to get up to go to the bathroom. Purewick now in place. Perineal cleansed.

## 2024-09-08 NOTE — PROGRESS NOTES
[facility]    Medicine Progress Note - Hospitalist Service    Date of Admission:  9/4/2024    Assessment & Plan   Elizabeth Kelley is 79 year old female with history of chronic right lower extremity venous ulcer, lymphedema of bilateral lower extremities, GERD, HTN, HLD, multiple sclerosis, restless legs, morbid obesity and depression admitted on 9/4/24 for evaluation of fever and generalized weakness and found to have sepsis related to RLE cellulitis. Lower extremity venous ultrasound was negative for deep venous thrombosis.  ID service is assisting with antibiotic therapy.    Sepsis  Cellulitis of right lower extremity  Chronic RLE ulcers   --Off IV vancomycin  --one more dose  IV Zosyn per ID recommendation then iv merrem [ cx with pseudomonas]  picc line today   --Analgesics as needed  --Follow up blood cultures  --Follow-up with wound swab  -- Wound care per wound care nurse  --ID [id number] assistance       Mild hypoxia on admission  CXR on admission shows shallow inspiration, normal vascularity, and likely volume loss RLL with elevation of right hemidiaphragm  -- Incentive spirometry  -- Supplemental oxygen as needed       KIKE  Resolved  -- Resume PTA spirolactone and lasix  -- Off normal saline at 75 mL/h  --Monitor BMP  --Avoid nephrotoxins    Chronic hypokalemia and hypomagnesemia  -- continue home replacements  -- trend K and Mag in AM    Generalized Weakness  Multiple sclerosis  -- PT/OT  --Outpatient neurology follow-up     Anemia, chronic  Hemoglobin is stable  -- continue home ferrous sulfate  --Monitor hemoglobin and transfuse as needed     Lymphedema of bilateral lower extremities  Neuropathy   Restless leg syndrome  --Continue home gabapentin and ropinirole  -- lymphedema therapy consult       Hypertension  -- Resume PTA spironolactone and furosemide      Hyperlipidemia  -- continue home simvastatin       Depression  -- continue home Buprion and citalopram    "    GERD  --Continue home PPI    DVT Prophylaxis: Heparin SQ  Amato Catheter: Not present  Lines: PRESENT             Cardiac Monitoring: None  Code Status: Full Code      Diet: Regular Diet Adult    DVT Prophylaxis: Heparin SQ  Amato Catheter: Not present  Lines: PRESENT      PICC 09/08/24 Single Lumen Right Cephalic Long Term Antibiotic-Site Assessment: WDL    Cardiac Monitoring: None  Code Status: Full Code      Clinically Significant Risk Factors              # Hypoalbuminemia: Lowest albumin = 3.2 g/dL at 9/4/2024 10:47 AM, will monitor as appropriate     # Hypertension: Noted on problem list        # Severe Obesity: Estimated body mass index is 47.05 kg/m  as calculated from the following:    Height as of an earlier encounter on 9/4/24: 1.549 m (5' 1\").    Weight as of an earlier encounter on 9/4/24: 112.9 kg (249 lb)., PRESENT ON ADMISSION       # Financial/Environmental Concerns: none      Disposition Plan     Medically Ready for Discharge: Anticipated in 2-4 Days      Jhonatan Suh MD  Hospitalist Service  [facility]  Securely message with [employer] (more info)  Text page via [employer] Paging/Directory   ______________________________________________________________________    Interval History   No events overnight, pain controled     Physical Exam   Vital Signs: Temp: 99  F (37.2  C) Temp src: Oral BP: (!) 149/66 Pulse: 77   Resp: 18 SpO2: 96 % O2 Device: None (Room air)    Weight: 0 lbs 0 oz    General appearance: Obese, awake, Alert, Cooperative, not in any obvious distress and appears stated age   HEENT: Normocephalic, atraumatic, conjunctiva clear without icterus and ears without discharge  Lungs: Clear to auscultation bilaterally, no wheezing, good air exchange, normal work of breathing  Cardiovascular: Regular Rate and Rythm, normal apical impulse, normal S1 and S2, no lower extremity edema bilaterally  Abdomen: Soft, non-tender and Non-distended, active bowel sounds  Skin: " Extensive superficial ulcers in the right leg with sloughs and greenish soakage on the wound dressing.  States dermatitis of the left lower extremity.  Musculoskeletal: No bony deformities or joint tenderness. Normal ROM upon flexion & extension.   Neurologic: Alert & Oriented X 3, Facial symmetry preserved and upper & lower extremities moving well with symmetry  Psychiatric: Calm, normal eye contact and normal affect      Medical Decision Making       40 MINUTES SPENT BY ME on the date of service doing chart review, history, exam, documentation & further activities per the note.      Data         Imaging results reviewed over the past 24 hrs:   No results found for this or any previous visit (from the past 24 hour(s)).

## 2024-09-08 NOTE — PROCEDURES
"PICC Line Insertion Procedure Note    Pt. Name:   Elizabeth Kelley  MRN:          [id number]    Procedure:     Insertion of a  SINGLE Lumen  4 fr  Bard SOLO (valved) Power PICC, Lot number [id number]    Indications: Antibiotic    Contraindications : None    Procedure Details:    Patient identified with 2 identifiers and \"Time Out\" conducted.    Central line insertion bundle followed: Hand hygiene performed prior to procedure, site cleansed with Cholraprep (CHG), hat, mask, sterile gloves, sterile gown worn, patient draped with maximum barrier head to toe drape, sterile field maintained.    The right upper arm CEPHALIC vein was assessed by ultrasound and found to be anechoic, compressible, patent, and of adequate size.     Lidocaine 1% 2.5 ml administered SQ to the insertion site.     Modified Seldinger Technique (MST) used for insertion, ONE attempt(s) required to access vein. Imaging during access shows the needle within the vein.     PICC was advanced through peel-away sheath.    Catheter threaded without difficulty. The tip of the catheter was positioned in the SVC. Good blood return noted.    Catheter was flushed with 20 cc normal saline.     Catheter secured with SecurAcath, Biopatch ([facility]), and Tegaderm dressing applied.    The sharps that are included in the PICC insertion kit were accounted for and disposed of in the sharps container prior to breakdown of the sterile field.    CLABSI prevention brochure left at bedside.    Patient  tolerated procedure well.     Patient's primary RN notified PICC is ready for use.      Findings:    Total catheter length  40 cm, with 1 cm exposed. Mid upper arm circumference is 40 cm.     Tip placement verified in the distal SVC by [employer]:        Comments:  None        Mazin Ji, MSN, RN, [facility]   Vascular Access - [facility]      "

## 2024-09-09 ENCOUNTER — APPOINTMENT (OUTPATIENT)
Dept: OCCUPATIONAL THERAPY | Facility: HOSPITAL | Age: 79
DRG: 872 | End: 2024-09-09
Attending: INTERNAL MEDICINE
Payer: COMMERCIAL

## 2024-09-09 ENCOUNTER — HOME INFUSION (PRE-WILLOW HOME INFUSION) (OUTPATIENT)
Dept: PHARMACY | Facility: CLINIC | Age: 79
End: 2024-09-09
Payer: COMMERCIAL

## 2024-09-09 LAB
ATRIAL RATE - MUSE: 77 BPM
BACTERIA BLD CULT: NO GROWTH
DIASTOLIC BLOOD PRESSURE - MUSE: NORMAL MMHG
INTERPRETATION ECG - MUSE: NORMAL
P AXIS - MUSE: 57 DEGREES
PR INTERVAL - MUSE: 156 MS
QRS DURATION - MUSE: 92 MS
QT - MUSE: 396 MS
QTC - MUSE: 448 MS
R AXIS - MUSE: -3 DEGREES
SYSTOLIC BLOOD PRESSURE - MUSE: NORMAL MMHG
T AXIS - MUSE: 33 DEGREES
VENTRICULAR RATE- MUSE: 77 BPM

## 2024-09-09 PROCEDURE — 250N000013 HC RX MED GY IP 250 OP 250 PS 637: Performed by: INTERNAL MEDICINE

## 2024-09-09 PROCEDURE — 250N000011 HC RX IP 250 OP 636: Performed by: INTERNAL MEDICINE

## 2024-09-09 PROCEDURE — 99232 SBSQ HOSP IP/OBS MODERATE 35: CPT | Performed by: INTERNAL MEDICINE

## 2024-09-09 PROCEDURE — 120N000001 HC R&B MED SURG/OB

## 2024-09-09 PROCEDURE — 97535 SELF CARE MNGMENT TRAINING: CPT | Mod: GO

## 2024-09-09 RX ORDER — MINOCYCLINE HYDROCHLORIDE 100 MG/1
100 CAPSULE ORAL EVERY 12 HOURS SCHEDULED
Status: DISCONTINUED | OUTPATIENT
Start: 2024-09-09 | End: 2024-09-11 | Stop reason: HOSPADM

## 2024-09-09 RX ORDER — MEROPENEM 1 G/1
1 INJECTION, POWDER, FOR SOLUTION INTRAVENOUS EVERY 8 HOURS
Status: SHIPPED
Start: 2024-09-10 | End: 2024-09-17

## 2024-09-09 RX ORDER — ROPINIROLE 0.25 MG/1
0.5 TABLET, FILM COATED ORAL
Status: DISCONTINUED | OUTPATIENT
Start: 2024-09-10 | End: 2024-09-11 | Stop reason: HOSPADM

## 2024-09-09 RX ADMIN — DIPHENHYDRAMINE HYDROCHLORIDE 25 MG: 25 CAPSULE ORAL at 21:39

## 2024-09-09 RX ADMIN — ROPINIROLE HYDROCHLORIDE 1 MG: 1 TABLET, FILM COATED ORAL at 21:13

## 2024-09-09 RX ADMIN — MEROPENEM 1 G: 1 INJECTION, POWDER, FOR SOLUTION INTRAVENOUS at 05:51

## 2024-09-09 RX ADMIN — HYDROMORPHONE HYDROCHLORIDE 0.4 MG: 0.2 INJECTION, SOLUTION INTRAMUSCULAR; INTRAVENOUS; SUBCUTANEOUS at 15:21

## 2024-09-09 RX ADMIN — CITALOPRAM HYDROBROMIDE 40 MG: 20 TABLET ORAL at 13:55

## 2024-09-09 RX ADMIN — HEPARIN SODIUM 5000 UNITS: 10000 INJECTION, SOLUTION INTRAVENOUS; SUBCUTANEOUS at 13:56

## 2024-09-09 RX ADMIN — ACETAMINOPHEN 650 MG: 325 TABLET ORAL at 14:02

## 2024-09-09 RX ADMIN — HEPARIN SODIUM 5000 UNITS: 10000 INJECTION, SOLUTION INTRAVENOUS; SUBCUTANEOUS at 05:50

## 2024-09-09 RX ADMIN — FERROUS SULFATE TAB 325 MG (65 MG ELEMENTAL FE) 325 MG: 325 (65 FE) TAB at 08:39

## 2024-09-09 RX ADMIN — SPIRONOLACTONE 25 MG: 25 TABLET, FILM COATED ORAL at 08:40

## 2024-09-09 RX ADMIN — FUROSEMIDE 20 MG: 20 TABLET ORAL at 18:05

## 2024-09-09 RX ADMIN — SIMVASTATIN 40 MG: 10 TABLET, FILM COATED ORAL at 21:13

## 2024-09-09 RX ADMIN — MAGNESIUM OXIDE TAB 400 MG (241.3 MG ELEMENTAL MG) 400 MG: 400 (241.3 MG) TAB at 08:40

## 2024-09-09 RX ADMIN — GABAPENTIN 100 MG: 100 CAPSULE ORAL at 21:13

## 2024-09-09 RX ADMIN — ASPIRIN 81 MG CHEWABLE TABLET 81 MG: 81 TABLET CHEWABLE at 08:40

## 2024-09-09 RX ADMIN — DIPHENHYDRAMINE HYDROCHLORIDE 25 MG: 25 CAPSULE ORAL at 01:21

## 2024-09-09 RX ADMIN — WHITE PETROLATUM: 1.75 OINTMENT TOPICAL at 08:42

## 2024-09-09 RX ADMIN — GABAPENTIN 100 MG: 100 CAPSULE ORAL at 08:39

## 2024-09-09 RX ADMIN — GABAPENTIN 100 MG: 100 CAPSULE ORAL at 13:56

## 2024-09-09 RX ADMIN — Medication 3 MG: at 01:15

## 2024-09-09 RX ADMIN — BUPROPION HYDROCHLORIDE 150 MG: 150 TABLET, FILM COATED, EXTENDED RELEASE ORAL at 08:40

## 2024-09-09 RX ADMIN — HEPARIN SODIUM 5000 UNITS: 10000 INJECTION, SOLUTION INTRAVENOUS; SUBCUTANEOUS at 21:12

## 2024-09-09 RX ADMIN — MINOCYCLINE HYDROCHLORIDE 100 MG: 100 CAPSULE ORAL at 19:47

## 2024-09-09 RX ADMIN — FUROSEMIDE 20 MG: 20 TABLET ORAL at 08:41

## 2024-09-09 RX ADMIN — MEROPENEM 1 G: 1 INJECTION, POWDER, FOR SOLUTION INTRAVENOUS at 21:16

## 2024-09-09 RX ADMIN — MEROPENEM 1 G: 1 INJECTION, POWDER, FOR SOLUTION INTRAVENOUS at 13:56

## 2024-09-09 RX ADMIN — PANTOPRAZOLE SODIUM 40 MG: 40 TABLET, DELAYED RELEASE ORAL at 08:40

## 2024-09-09 RX ADMIN — ROPINIROLE HYDROCHLORIDE 0.5 MG: 0.25 TABLET, FILM COATED ORAL at 08:41

## 2024-09-09 RX ADMIN — ACETAMINOPHEN 650 MG: 325 TABLET ORAL at 01:15

## 2024-09-09 RX ADMIN — HYDROMORPHONE HYDROCHLORIDE 0.4 MG: 0.2 INJECTION, SOLUTION INTRAMUSCULAR; INTRAVENOUS; SUBCUTANEOUS at 21:42

## 2024-09-09 RX ADMIN — POTASSIUM CHLORIDE 20 MEQ: 1500 TABLET, EXTENDED RELEASE ORAL at 21:13

## 2024-09-09 ASSESSMENT — ACTIVITIES OF DAILY LIVING (ADL)
ADLS_ACUITY_SCORE: 33
ADLS_ACUITY_SCORE: 33
ADLS_ACUITY_SCORE: 37
ADLS_ACUITY_SCORE: 34
ADLS_ACUITY_SCORE: 33
ADLS_ACUITY_SCORE: 37
ADLS_ACUITY_SCORE: 33
ADLS_ACUITY_SCORE: 33
ADLS_ACUITY_SCORE: 34
ADLS_ACUITY_SCORE: 33
ADLS_ACUITY_SCORE: 37
ADLS_ACUITY_SCORE: 37
ADLS_ACUITY_SCORE: 33
ADLS_ACUITY_SCORE: 34
ADLS_ACUITY_SCORE: 33
ADLS_ACUITY_SCORE: 33
ADLS_ACUITY_SCORE: 37
ADLS_ACUITY_SCORE: 33
ADLS_ACUITY_SCORE: 37

## 2024-09-09 NOTE — PLAN OF CARE
Problem: Adult Inpatient Plan of Care  Goal: Plan of Care Review  Outcome: Progressing     Problem: Adult Inpatient Plan of Care  Goal: Patient-Specific Goal (Individualized)  Outcome: Progressing     Problem: Adult Inpatient Plan of Care  Goal: Absence of Hospital-Acquired Illness or Injury  Outcome: Progressing  Intervention: Identify and Manage Fall Risk  Recent Flowsheet Documentation  Taken 9/8/2024 1959 by Adegun, Oluwadamilola, RN  Safety Promotion/Fall Prevention:   activity supervised   nonskid shoes/slippers when out of bed  Intervention: Prevent Skin Injury  Recent Flowsheet Documentation  Taken 9/8/2024 1959 by Adegun, Oluwadamilola, RN  Body Position: position maintained  Intervention: Prevent Infection  Recent Flowsheet Documentation  Taken 9/8/2024 1959 by Adegun, Oluwadamilola, RN  Infection Prevention: hand hygiene promoted     Problem: Risk for Delirium  Goal: Improved Attention and Thought Clarity  Outcome: Progressing     Problem: Comorbidity Management  Goal: Blood Pressure in Desired Range  Outcome: Progressing     Problem: Skin or Soft Tissue Infection  Goal: Absence of Infection Signs and Symptoms  Outcome: Progressing  Intervention: Minimize and Manage Infection Progression  Recent Flowsheet Documentation  Taken 9/8/2024 1959 by Adegun, Oluwadamilola, RN  Infection Prevention: hand hygiene promoted  Isolation Precautions: contact precautions maintained   Goal Outcome Evaluation: Pt alert and oriented, able to make needs known. VSS, pain managed with PRN dilaudid X2. PICC line in place, IV abx administered per order.

## 2024-09-09 NOTE — PROGRESS NOTES
[facility]  Infectious Disease Progress Note       09/09/2024      Assessment & Plan   right lower extremity recurrent cellulitis- Pseudomonas, MSSA, Proteus, Stenotrophomonas  Sepsis- improving  Prior cellulitis episode was in July   risk factors obesity, lymphedema with open wounds,  Venous insufficiency  Leukocytosis improved 21K to 14 K to 7 K  Fever resolved quickly       Susceptibility data from last 90 days.  Collected Specimen Info Organism Ampicillin Ampicillin/Sulbactam Cefepime Ceftazidime Ceftriaxone Ciprofloxacin Clindamycin Daptomycin Doxycycline Erythromycin Gentamicin Levofloxacin Meropenem Minocycline Oxacillin   09/05/24 Wound from Leg, Right Pseudomonas aeruginosa    S  S   S       S  S       Stenotrophomonas maltophilia             I   S      Proteus mirabilis  S  S  S  S  S  S      S  S  S       Staphylococcus aureus       R  S  R  R  S     S     Candida parapsilosis complex                  07/16/24 Urine, NOS Urogenital trang                    Collected Specimen Info Organism Piperacillin/Tazobactam Tetracycline Tobramycin Trimethoprim/Sulfamethoxazole  Vancomycin   09/05/24 Wound from Leg, Right Pseudomonas aeruginosa  S   S       Stenotrophomonas maltophilia     S      Proteus mirabilis  S   S  S      Staphylococcus aureus   R   R  S     Candida parapsilosis complex        07/16/24 Urine, NOS Urogenital trang               PLAN  Discontinued vancomycin  Meropenem x 7 days on discharge plus PO Minocycline x 10 days  PICC  Leg elevation  Wound care  Please see previous ID notes by Blayne Parham M.D.  Updated patient and hospitalist. ID will sign off. Please call with questions      Kathe Vargas MD  [facility]  Answering Service: [phone]  On-Call ID provider: [phone], option: 9    ______________________________________________________________________    Interval History   IV infiltrated- PICC ordered- doing well.   Dressing      Previous note    Dressing change with nurse-- painful  Healing    Previous note: She was worried emanuel she had chest tightness with high blood pressure, addressed by hospitalist      Past medical, family, and social history reviewed, unchanged from before.  All 12 systems reviewed, negative except for the above.      Physical Exam   Vital Signs: Temp: 99.7  F (37.6  C) Temp src: Oral BP: (!) 140/64 Pulse: 73   Resp: 18 SpO2: 94 % O2 Device: None (Room air)    Weight: 0 lbs 0 oz  \Gen. appearance nontoxic  Eyes no conjunctivitis or icterus  Neck no stiffness   Heartedema  Lungs no sob  Abdomen soft not tender  Extremities no synovitis,RLE massive lymphedema  And erythema mostly below knee , better  Above knee erythema faded>resolved  Superificila wounds, lots of weeping  Skin  erythema, superficial ulceration  Neurologic alert oriented no focal deficits      Previous notes             Data   No results found for this or any previous visit (from the past 24 hour(s)).      Medical Decision Making     Medical Decision Making       MANAGEMENT DISCUSSED with the following over the past 24 hours: patient, hospitalist   NOTE(S)/MEDICAL RECORDS REVIEWED over the past 24 hours: reviewed  Tests ORDERED & REVIEWED in the past 24 hours:  - See lab/imaging results included in the data section of the note  Medical complexity over the past 24 hours:  - Intensive monitoring for MEDICATION TOXICITY

## 2024-09-09 NOTE — PLAN OF CARE
Problem: Adult Inpatient Plan of Care  Goal: Absence of Hospital-Acquired Illness or Injury  Outcome: Progressing  Intervention: Identify and Manage Fall Risk  Recent Flowsheet Documentation  Taken 9/8/2024 1558 by Jeannette Orellana RN  Safety Promotion/Fall Prevention: activity supervised  Taken 9/8/2024 0851 by Jeannette Orellana RN  Safety Promotion/Fall Prevention: activity supervised  Intervention: Prevent Skin Injury  Recent Flowsheet Documentation  Taken 9/8/2024 1558 by Jeannette Orellana RN  Body Position:   weight shifting   turned  Taken 9/8/2024 0851 by Jeannette Orellana RN  Body Position:   weight shifting   turned  Intervention: Prevent Infection  Recent Flowsheet Documentation  Taken 9/8/2024 1558 by Jeannette Orellana RN  Infection Prevention: hand hygiene promoted  Taken 9/8/2024 0851 by Jeannette Orellana RN  Infection Prevention: hand hygiene promoted  Goal: Optimal Comfort and Wellbeing  Outcome: Progressing  Intervention: Monitor Pain and Promote Comfort  Recent Flowsheet Documentation  Taken 9/8/2024 1421 by Jeannette Orellana RN  Pain Management Interventions: medication (see MAR)  Taken 9/8/2024 1350 by Jeannette Orellana RN  Pain Management Interventions: medication (see MAR)  Taken 9/8/2024 0851 by Jeannette Orellana RN  Pain Management Interventions: declines     Problem: Comorbidity Management  Goal: Blood Pressure in Desired Range  Outcome: Progressing  Intervention: Maintain Blood Pressure Management  Recent Flowsheet Documentation  Taken 9/8/2024 1558 by Jeannette Orellana RN  Medication Review/Management: medications reviewed  Taken 9/8/2024 0851 by Jeannette Orellana RN  Medication Review/Management: medications reviewed     Problem: Sepsis/Septic Shock  Goal: Absence of Infection Signs and Symptoms  Outcome: Progressing  Intervention: Initiate Sepsis Management  Recent Flowsheet Documentation  Taken 9/8/2024 1558 by Jeannette Orellana  RN  Infection Prevention: hand hygiene promoted  Isolation Precautions: contact precautions maintained  Taken 9/8/2024 0851 by Jeannette Orellana RN  Infection Prevention: hand hygiene promoted  Isolation Precautions: contact precautions maintained  Intervention: Promote Recovery  Recent Flowsheet Documentation  Taken 9/8/2024 1558 by Jeannette Orellana RN  Activity Management: activity adjusted per tolerance  Taken 9/8/2024 0851 by Jeannette Orellana RN  Activity Management: activity adjusted per tolerance   Goal Outcome Evaluation:       Patient alert and oriented. Reported having moderate right lower extremity pain. Administered prn dilaudid and tylenol for pain management. She also c/o of restless legs this afternoon, gave her PRN ropinirole which provided relief. The wounds on her RLE were weeping so dressing was changed and lymph wraps were applied. Vitals stable. New PICC line placed this afternoon. Patient will continue to be on IV antibiotics.

## 2024-09-09 NOTE — PROGRESS NOTES
Patient does have coverage for iv abx through their [facility] Medicare Advantage plan, however the drug must be on a CADD pump for coverage. If not on a CADD pump, then the patient would be self-pay due to no coverage. Patient has an 80/20 coverage until they have met their out of pocket of $3000 (patient has met $1903.38 at this time).     Based on Meropenem and Zoysn , it can go on a CADD so patient should be covered.   For nursing, patient should have coverage if homebound, however [facility] is not contracted with Medicare and an outside nursing agency would be utilized instead. If patient is not homebound, there is no coverage and [facility] can see patient if patient agrees to self-pay for $90 per visit.    Patient should have coverage in a TCU or infusion center.    ([facility]) In reference to admission date 09/04/2024.    Please contact Intake with any questions, [phone] or In Basket pool,  Home Infusion ([phone]).

## 2024-09-09 NOTE — PROGRESS NOTES
Care Management Follow Up    Length of Stay (days): 5    Expected Discharge Date: 09/10/2024    Anticipated Discharge Plan:   home w/[facility] w/[facility]    Transportation: TBD    PT Recommendations: home with assist  OT Recommendations:  home with assist     Barriers to Discharge: medical stability, IV abx    Prior Living Situation: house with significant other    Discussed  Partnership in Safe Discharge Planning  document with patient/family: Yes: pt     Handoff Completed: No, handoff not indicated or clinically appropriate    Patient/Spokesperson Updated: No    Additional Information:  Not medically ready: pending ID final treatment plan. [facility] referral and pt has coverage, see Note from Wanda, IV drug must be on CADD pump for coverage.    Next Steps: final tx plan    Praveena Ortiz RN

## 2024-09-09 NOTE — PLAN OF CARE
Problem: Risk for Delirium  Goal: Improved Sleep  Intervention: Promote Sleep  Recent Flowsheet Documentation  Taken 9/9/2024 0102 by Blas Frias RN  Sleep/Rest Enhancement: awakenings minimized     Problem: Skin or Soft Tissue Infection  Goal: Absence of Infection Signs and Symptoms  Outcome: Progressing  Intervention: Minimize and Manage Infection Progression  Recent Flowsheet Documentation  Taken 9/9/2024 0102 by Blas Frias RN  Infection Prevention: hand hygiene promoted  Isolation Precautions: contact precautions maintained   Problem: Wound  Goal: Optimal Pain Control and Function  Intervention: Prevent or Manage Pain  Recent Flowsheet Documentation  Taken 9/9/2024 0102 by Blas Frias RN  Sleep/Rest Enhancement: awakenings minimized   Goal Outcome Evaluation:    Patient continues to be on contact precautions d/t MRSA. Afebrile. Right lower extremity has +4 edema and ulcers. Dressing to right leg is intact, covered with lymph wrap. Extremities elevated. Complained of pain to RLE rated 5/10, given prn Tylenol 650 mg at 0115. Given prn melatonin for insomnia. Pt noted to be scratching her right arm, given prn oral Benadryl 25 mg for itching at 0121 with good effect. PICC line to RUE is intact and patent.

## 2024-09-09 NOTE — PROGRESS NOTES
[facility]    Referral received  from Praveena AVALOS RN, CM for home IV antibiotics.    Benefits verified.     Pt has coverage for iv abx through their [facility] Medicare Advantage plan, however the drug must be on a CADD pump for coverage. If not on a CADD pump, then the patient would be self-pay due to no coverage. Patient has an 80/20 coverage until they have met their out of pocket of $3000 (patient has met $1903.38 at this time).      Based on Meropenem and Zoysn , it can go on a CADD so patient should be covered.   For nursing, patient should have coverage if homebound, however [facility] is not contracted with Medicare and an outside nursing agency would be utilized instead. If patient is not homebound, there is no coverage and [facility] can see patient if patient agrees to self-pay for $90 per visit.     Patient should have coverage in a TCU or infusion center.       Writer will speak with pt to review benefits, home infusion and to offer choice of agency/home infusion provider.    Thank you for the referral.    Talita Adames RN  [facility] Liaison  [phone](Mon-Fri, 8:00 am-5:00 pm)  [phone] (Office)

## 2024-09-09 NOTE — PROGRESS NOTES
[facility]    Medicine Progress Note - Hospitalist Service    Date of Admission:  9/4/2024    Assessment & Plan   Elizabeth Kelley is 79 year old female with history of chronic right lower extremity venous ulcer, lymphedema of bilateral lower extremities, GERD, HTN, HLD, multiple sclerosis, restless legs, morbid obesity and depression admitted on 9/4/24 for evaluation of fever and generalized weakness and found to have sepsis related to RLE cellulitis. Lower extremity venous ultrasound was negative for deep venous thrombosis.  ID service is assisting with antibiotic therapy.    Sepsis  Cellulitis of right lower extremity  Chronic RLE ulcers   --Off IV vancomycin/IV Zosyn  Cultures showing Pseudomonas, MSSA, Proteus, stenotrophomonas  ID started meropenem via PICC line for 7 days on discharge, plus p.o. minocycline for 10 days  No growth to date on blood cultures  ID following       Mild hypoxia on admission  CXR on admission shows shallow inspiration, normal vascularity, and likely volume loss RLL with elevation of right hemidiaphragm  -- Incentive spirometry  -- Supplemental oxygen as needed       KIKE  Resolved  -- Resumed PTA spirolactone and lasix  --Monitor BMP  --Avoid nephrotoxins    Chronic hypokalemia and hypomagnesemia  -- continue home replacements  -- trend K and Mag in AM    Generalized Weakness  Multiple sclerosis  -- PT/OT  --Outpatient neurology follow-up     Anemia, chronic  Hemoglobin is stable  -- continue home ferrous sulfate  --Monitor hemoglobin and transfuse as needed     Lymphedema of bilateral lower extremities  Neuropathy   Restless leg syndrome  --Continue home gabapentin and ropinirole  -- lymphedema therapy consult       Hypertension  -- Resume PTA spironolactone and furosemide      Hyperlipidemia  -- continue home simvastatin       Depression  -- continue home Buprion and citalopram       GERD  --Continue home PPI    DVT Prophylaxis: Heparin SQ  Amato Catheter:  "Not present  Lines: PRESENT             Cardiac Monitoring: None  Code Status: Full Code      Diet: Regular Diet Adult    DVT Prophylaxis: Heparin SQ  Amato Catheter: Not present  Lines: PRESENT      PICC 09/08/24 Single Lumen Right Cephalic Long Term Antibiotic-Site Assessment: WDL    Cardiac Monitoring: None  Code Status: Full Code      Clinically Significant Risk Factors              # Hypoalbuminemia: Lowest albumin = 3.2 g/dL at 9/4/2024 10:47 AM, will monitor as appropriate     # Hypertension: Noted on problem list        # Severe Obesity: Estimated body mass index is 47.05 kg/m  as calculated from the following:    Height as of an earlier encounter on 9/4/24: 1.549 m (5' 1\").    Weight as of an earlier encounter on 9/4/24: 112.9 kg (249 lb).        # Financial/Environmental Concerns: none      Disposition Plan     Medically Ready for Discharge: Anticipated in 2-4 Days      Jhonatan Suh MD  Hospitalist Service  [facility]  Securely message with [employer] (more info)  Text page via [employer] Paging/Directory   ______________________________________________________________________    Interval History   In good spirits this morning, no complaints currently    Physical Exam   Vital Signs: Temp: 98.8  F (37.1  C) Temp src: Oral BP: 125/59 Pulse: 79   Resp: 18 SpO2: 94 % O2 Device: None (Room air)    Weight: 0 lbs 0 oz    General appearance: AAOx3  Lungs: Clear to auscultation bilaterally,   Cardiovascular: Regular Rate and Rythm, normal apical impulse, normal S1 and S2, bilateral edema  Abdomen: Soft, non-tender and Non-distended, active bowel sounds  Musculoskeletal: Bilateral edema noted, right leg was dressed this was not examined      Medical Decision Making       40 MINUTES SPENT BY ME on the date of service doing chart review, history, exam, documentation & further activities per the note.      Data         Imaging results reviewed over the past 24 hrs:   No results found for this or any " previous visit (from the past 24 hour(s)).

## 2024-09-10 ENCOUNTER — APPOINTMENT (OUTPATIENT)
Dept: OCCUPATIONAL THERAPY | Facility: HOSPITAL | Age: 79
DRG: 872 | End: 2024-09-10
Attending: INTERNAL MEDICINE
Payer: COMMERCIAL

## 2024-09-10 ENCOUNTER — APPOINTMENT (OUTPATIENT)
Dept: PHYSICAL THERAPY | Facility: HOSPITAL | Age: 79
DRG: 872 | End: 2024-09-10
Attending: INTERNAL MEDICINE
Payer: COMMERCIAL

## 2024-09-10 LAB
ANION GAP SERPL CALCULATED.3IONS-SCNC: 7 MMOL/L (ref 7–15)
BUN SERPL-MCNC: 16.1 MG/DL (ref 8–23)
CALCIUM SERPL-MCNC: 8.9 MG/DL (ref 8.8–10.4)
CHLORIDE SERPL-SCNC: 100 MMOL/L (ref 98–107)
CREAT SERPL-MCNC: 0.9 MG/DL (ref 0.51–0.95)
EGFRCR SERPLBLD CKD-EPI 2021: 66 ML/MIN/1.73M2
GLUCOSE SERPL-MCNC: 92 MG/DL (ref 70–99)
HCO3 SERPL-SCNC: 31 MMOL/L (ref 22–29)
HGB BLD-MCNC: 10.2 G/DL (ref 11.7–15.7)
PLATELET # BLD AUTO: 320 10E3/UL (ref 150–450)
POTASSIUM SERPL-SCNC: 4.3 MMOL/L (ref 3.4–5.3)
SODIUM SERPL-SCNC: 138 MMOL/L (ref 135–145)

## 2024-09-10 PROCEDURE — 250N000011 HC RX IP 250 OP 636: Performed by: INTERNAL MEDICINE

## 2024-09-10 PROCEDURE — 82374 ASSAY BLOOD CARBON DIOXIDE: CPT | Performed by: INTERNAL MEDICINE

## 2024-09-10 PROCEDURE — 85049 AUTOMATED PLATELET COUNT: CPT | Performed by: INTERNAL MEDICINE

## 2024-09-10 PROCEDURE — 82565 ASSAY OF CREATININE: CPT | Performed by: INTERNAL MEDICINE

## 2024-09-10 PROCEDURE — 120N000001 HC R&B MED SURG/OB

## 2024-09-10 PROCEDURE — 97110 THERAPEUTIC EXERCISES: CPT | Mod: GO

## 2024-09-10 PROCEDURE — 250N000013 HC RX MED GY IP 250 OP 250 PS 637: Performed by: INTERNAL MEDICINE

## 2024-09-10 PROCEDURE — 99232 SBSQ HOSP IP/OBS MODERATE 35: CPT | Performed by: INTERNAL MEDICINE

## 2024-09-10 PROCEDURE — 85018 HEMOGLOBIN: CPT | Performed by: INTERNAL MEDICINE

## 2024-09-10 PROCEDURE — 97116 GAIT TRAINING THERAPY: CPT | Mod: GP

## 2024-09-10 PROCEDURE — 97535 SELF CARE MNGMENT TRAINING: CPT | Mod: GO

## 2024-09-10 RX ADMIN — GABAPENTIN 100 MG: 100 CAPSULE ORAL at 09:19

## 2024-09-10 RX ADMIN — MINOCYCLINE HYDROCHLORIDE 100 MG: 100 CAPSULE ORAL at 20:49

## 2024-09-10 RX ADMIN — HYDROMORPHONE HYDROCHLORIDE 1 MG: 2 TABLET ORAL at 20:51

## 2024-09-10 RX ADMIN — ROPINIROLE HYDROCHLORIDE 0.5 MG: 0.25 TABLET, FILM COATED ORAL at 09:19

## 2024-09-10 RX ADMIN — HEPARIN SODIUM 5000 UNITS: 10000 INJECTION, SOLUTION INTRAVENOUS; SUBCUTANEOUS at 06:48

## 2024-09-10 RX ADMIN — POTASSIUM CHLORIDE 20 MEQ: 1500 TABLET, EXTENDED RELEASE ORAL at 20:51

## 2024-09-10 RX ADMIN — PANTOPRAZOLE SODIUM 40 MG: 40 TABLET, DELAYED RELEASE ORAL at 09:18

## 2024-09-10 RX ADMIN — FUROSEMIDE 20 MG: 20 TABLET ORAL at 09:18

## 2024-09-10 RX ADMIN — ASPIRIN 81 MG CHEWABLE TABLET 81 MG: 81 TABLET CHEWABLE at 09:18

## 2024-09-10 RX ADMIN — HEPARIN SODIUM 5000 UNITS: 10000 INJECTION, SOLUTION INTRAVENOUS; SUBCUTANEOUS at 22:03

## 2024-09-10 RX ADMIN — SPIRONOLACTONE 25 MG: 25 TABLET, FILM COATED ORAL at 09:18

## 2024-09-10 RX ADMIN — BUPROPION HYDROCHLORIDE 150 MG: 150 TABLET, FILM COATED, EXTENDED RELEASE ORAL at 09:20

## 2024-09-10 RX ADMIN — CITALOPRAM HYDROBROMIDE 40 MG: 20 TABLET ORAL at 11:04

## 2024-09-10 RX ADMIN — HYDROMORPHONE HYDROCHLORIDE 0.2 MG: 0.2 INJECTION, SOLUTION INTRAMUSCULAR; INTRAVENOUS; SUBCUTANEOUS at 06:48

## 2024-09-10 RX ADMIN — ROPINIROLE HYDROCHLORIDE 1 MG: 1 TABLET, FILM COATED ORAL at 20:49

## 2024-09-10 RX ADMIN — FUROSEMIDE 20 MG: 20 TABLET ORAL at 17:04

## 2024-09-10 RX ADMIN — HYDROMORPHONE HYDROCHLORIDE 1 MG: 2 TABLET ORAL at 17:04

## 2024-09-10 RX ADMIN — HYDROMORPHONE HYDROCHLORIDE 0.4 MG: 0.2 INJECTION, SOLUTION INTRAMUSCULAR; INTRAVENOUS; SUBCUTANEOUS at 11:05

## 2024-09-10 RX ADMIN — MEROPENEM 1 G: 1 INJECTION, POWDER, FOR SOLUTION INTRAVENOUS at 22:03

## 2024-09-10 RX ADMIN — MINOCYCLINE HYDROCHLORIDE 100 MG: 100 CAPSULE ORAL at 09:19

## 2024-09-10 RX ADMIN — GABAPENTIN 100 MG: 100 CAPSULE ORAL at 20:51

## 2024-09-10 RX ADMIN — GABAPENTIN 100 MG: 100 CAPSULE ORAL at 13:04

## 2024-09-10 RX ADMIN — MEROPENEM 1 G: 1 INJECTION, POWDER, FOR SOLUTION INTRAVENOUS at 06:47

## 2024-09-10 RX ADMIN — MEROPENEM 1 G: 1 INJECTION, POWDER, FOR SOLUTION INTRAVENOUS at 13:04

## 2024-09-10 RX ADMIN — ROPINIROLE HYDROCHLORIDE 0.5 MG: 0.25 TABLET, FILM COATED ORAL at 11:12

## 2024-09-10 RX ADMIN — WHITE PETROLATUM: 1.75 OINTMENT TOPICAL at 12:23

## 2024-09-10 RX ADMIN — MAGNESIUM OXIDE TAB 400 MG (241.3 MG ELEMENTAL MG) 400 MG: 400 (241.3 MG) TAB at 09:18

## 2024-09-10 RX ADMIN — SIMVASTATIN 40 MG: 10 TABLET, FILM COATED ORAL at 20:50

## 2024-09-10 RX ADMIN — HEPARIN SODIUM 5000 UNITS: 10000 INJECTION, SOLUTION INTRAVENOUS; SUBCUTANEOUS at 13:04

## 2024-09-10 RX ADMIN — HYDROMORPHONE HYDROCHLORIDE 0.4 MG: 0.2 INJECTION, SOLUTION INTRAMUSCULAR; INTRAVENOUS; SUBCUTANEOUS at 01:01

## 2024-09-10 ASSESSMENT — ACTIVITIES OF DAILY LIVING (ADL)
ADLS_ACUITY_SCORE: 37

## 2024-09-10 NOTE — PLAN OF CARE
Occupational Therapy Discharge Summary    Reason for therapy discharge:    Goals met    Progress towards therapy goal(s). See goals on Care Plan in [facility] electronic health record for goal details.  Goals met    Therapy recommendation(s):    No further therapy is recommended.Pt is safe to return home w/family/SO assist

## 2024-09-10 NOTE — PROGRESS NOTES
[facility]    Medicine Progress Note - Hospitalist Service    Date of Admission:  9/4/2024    Assessment & Plan   Elizabeth Kelley is 79 year old female with history of chronic right lower extremity venous ulcer, lymphedema of bilateral lower extremities, GERD, HTN, HLD, multiple sclerosis, restless legs, morbid obesity and depression admitted on 9/4/24 for evaluation of fever and generalized weakness and found to have sepsis related to RLE cellulitis. Lower extremity venous ultrasound was negative for deep venous thrombosis.  ID service is assisting with antibiotic therapy.    Sepsis  Cellulitis of right lower extremity  Chronic RLE ulcers   --Off IV vancomycin/IV Zosyn  Cultures showing Pseudomonas, MSSA, Proteus, stenotrophomonas  ID started meropenem via PICC line for 7 days on discharge, plus p.o. minocycline for 10 days  No growth to date on blood cultures  ID following       Mild hypoxia on admission  CXR on admission shows shallow inspiration, normal vascularity, and likely volume loss RLL with elevation of right hemidiaphragm  -- Incentive spirometry  -- Supplemental oxygen as needed       KIKE  Resolved  -- Resumed PTA spirolactone and lasix  --Monitor BMP  --Avoid nephrotoxins    Chronic hypokalemia and hypomagnesemia  -- continue home replacements  -- trend K and Mag in AM    Generalized Weakness  Multiple sclerosis  -- PT/OT  --Outpatient neurology follow-up     Anemia, chronic  Hemoglobin is stable  -- continue home ferrous sulfate  --Monitor hemoglobin and transfuse as needed     Lymphedema of bilateral lower extremities  Neuropathy   Restless leg syndrome  --Continue home gabapentin and ropinirole  -- lymphedema therapy consult       Hypertension  -- Resume PTA spironolactone and furosemide      Hyperlipidemia  -- continue home simvastatin       Depression  -- continue home Buprion and citalopram       GERD  --Continue home PPI    DVT Prophylaxis: Heparin SQ  Amato Catheter:  "Not present  Lines: PRESENT             Cardiac Monitoring: None  Code Status: Full Code      Diet: Regular Diet Adult    DVT Prophylaxis: Heparin SQ  Amato Catheter: Not present  Lines: PRESENT      PICC 09/08/24 Single Lumen Right Cephalic Long Term Antibiotic-Site Assessment: WDL    Cardiac Monitoring: None  Code Status: Full Code      Clinically Significant Risk Factors              # Hypoalbuminemia: Lowest albumin = 3.2 g/dL at 9/4/2024 10:47 AM, will monitor as appropriate     # Hypertension: Noted on problem list        # Severe Obesity: Estimated body mass index is 48.24 kg/m  as calculated from the following:    Height as of an earlier encounter on 9/4/24: 1.549 m (5' 1\").    Weight as of this encounter: 115.8 kg (255 lb 4.7 oz).        # Financial/Environmental Concerns: none      Disposition Plan     Medically Ready for Discharge: Anticipated in 2-4 Days      Jhonatan Suh MD  Hospitalist Service  [facility]  Securely message with [employer] (more info)  Text page via [employer] Paging/Directory   ______________________________________________________________________    Interval History   No events reported overnight,    Physical Exam   Vital Signs: Temp: 98.5  F (36.9  C) Temp src: Oral BP: 138/64 Pulse: 74   Resp: 16 SpO2: 93 % O2 Device: None (Room air)    Weight: 255 lbs 4.68 oz    General appearance: AAOx3  Lungs: Clear to auscultation bilaterally,   Cardiovascular: Regular Rate and Rythm, normal apical impulse, normal S1 and S2, bilateral edema  Abdomen: Soft, non-tender and Non-distended, active bowel sounds  Musculoskeletal: Bilateral edema noted, right leg was dressed this was not examined      Medical Decision Making       40 MINUTES SPENT BY ME on the date of service doing chart review, history, exam, documentation & further activities per the note.      Data     I have personally reviewed the following data over the past 24 hrs:    N/A  \   10.2 (L)   / 320     138 100 16.1 " /  92   4.3 31 (H) 0.90 \       Imaging results reviewed over the past 24 hrs:   No results found for this or any previous visit (from the past 24 hour(s)).

## 2024-09-10 NOTE — PLAN OF CARE
Problem: Skin or Soft Tissue Infection  Goal: Absence of Infection Signs and Symptoms  Outcome: Progressing     Problem: Wound  Goal: Optimal Coping  Outcome: Progressing  Goal: Optimal Functional Ability  Outcome: Progressing  Intervention: Optimize Functional Ability  Recent Flowsheet Documentation  Taken 9/9/2024 1909 by Alicia London RN  Activity Management: activity adjusted per tolerance  Goal: Absence of Infection Signs and Symptoms  Outcome: Progressing  Goal: Improved Oral Intake  Outcome: Progressing  Goal: Optimal Pain Control and Function  Outcome: Progressing  Goal: Skin Health and Integrity  Outcome: Progressing  Intervention: Optimize Skin Protection  Recent Flowsheet Documentation  Taken 9/9/2024 1909 by Alicia London RN  Activity Management: activity adjusted per tolerance  Goal: Optimal Wound Healing  Outcome: Progressing     Problem: Pain Acute  Goal: Optimal Pain Control and Function  Outcome: Progressing  Intervention: Prevent or Manage Pain  Recent Flowsheet Documentation  Taken 9/9/2024 1909 by Alicia London RN  Medication Review/Management: medications reviewed     Goal Outcome Evaluation:    Pt bedfast and purewick in place with no complications. CHG bath given and pt reported itchiness. PRN benadryl given. Pt reported pain and PRN IV dilaudid given. Pt on IV antibiotics for cellulitis. Leg wrap in place, c/d/I. Trace edema in RLE. Pt denied SOB and chest pain.

## 2024-09-10 NOTE — PLAN OF CARE
"Goal Outcome Evaluation:      Problem: Adult Inpatient Plan of Care  Goal: Patient-Specific Goal (Individualized)  Description: You can add care plan individualizations to a care plan. Examples of Individualization might be:  \"Parent requests to be called daily at 9am for status\", \"I have a hard time hearing out of my right ear\", or \"Do not touch me to wake me up as it startles  me\".  Outcome: Progressing     Problem: Adult Inpatient Plan of Care  Goal: Optimal Comfort and Wellbeing  Outcome: Progressing     Problem: Risk for Delirium  Goal: Optimal Coping  Outcome: Progressing     Problem: Risk for Delirium  Goal: Improved Behavioral Control  Outcome: Progressing     Problem: Risk for Delirium  Goal: Improved Attention and Thought Clarity  Outcome: Progressing    A/O x4. VSS. PRN Tylenol and Dilaudid were given for right leg pain which were effective per pt. Right lower leg wound cares done. New dressing applied. Assist of one with ADLs.  Using purewick and bathroom.                            "

## 2024-09-10 NOTE — PLAN OF CARE
Problem: Sepsis/Septic Shock  Goal: Optimal Coping  9/10/2024 0158 by Candy Briscoe RN  Outcome: Progressing  9/10/2024 0158 by Candy Briscoe RN  Outcome: Not Progressing  Goal: Absence of Bleeding  9/10/2024 0158 by Candy Briscoe RN  Outcome: Progressing  9/10/2024 0158 by Candy Briscoe, RN  Outcome: Not Progressing  Goal: Blood Glucose Level Within Targeted Range  9/10/2024 0158 by Candy Briscoe, RN  Outcome: Progressing  9/10/2024 0158 by Candy Briscoe, RN  Outcome: Not Progressing  Goal: Absence of Infection Signs and Symptoms  9/10/2024 0158 by Candy Briscoe, RN  Outcome: Progressing  9/10/2024 0158 by Candy Briscoe RN  Outcome: Not Progressing  Goal: Optimal Nutrition Intake  9/10/2024 0158 by Candy Briscoe, RN  Outcome: Progressing  9/10/2024 0158 by Candy Briscoe, RN  Outcome: Not Progressing     Problem: Pain Acute  Goal: Optimal Pain Control and Function  9/10/2024 0158 by Candy Briscoe RN  Outcome: Progressing  9/10/2024 0158 by Candy Briscoe RN  Outcome: Not Progressing   Goal Outcome Evaluation:       Patient is alert and oriented and able to make needs known.  Patien was given IV dilaudid for pain per MAR.  Patient using paper towels in devon area, inter dry ordered when store room opened.  Patient reports not falling asleep until around 5 am.   LLE lymph wrap CDI.    /57 (BP Location: Left arm)   Pulse 74   Temp 99.9  F (37.7  C) (Oral)   Resp 17   SpO2 95%     Candy Briscoe RN

## 2024-09-10 NOTE — PROGRESS NOTES
Care Management Follow Up    Length of Stay (days): 6    Expected Discharge Date: 09/11/2024    Anticipated Discharge Plan:   home vs TCU    Transportation: TBD    PT Recommendations: home with assist  OT Recommendations:  home with assist     Barriers to Discharge: medical stability, IV abx    Prior Living Situation: house with significant other    Discussed  Partnership in Safe Discharge Planning  document with patient/family: Yes: pt wants to go home instead of TCU and states she is independent enough     Handoff Completed: No, handoff not indicated or clinically appropriate    Patient/Spokesperson Updated: No    Additional Information:  Pt lives at home w/significant other and has [facility] for woundcare x3/wk.    Alexandrea from [facility] called and states that if pt needs home infusion q 8 hours, she may not be able to manage at home, and if woundcare is daily, [facility] will not be able to manage. Requesting pt and hospital to consider TCU placement. Pt is not agreeable to TCU at this time and states she can manage at home and she doesn't need wound care daily as it is not at home and just here because everyone wants to see it.       Next Steps: medical stability, decision from medical team if she needs TCU or if she can go home. And final ID plan for home infusion, coverage is not 100%, see [facility] note for pricing.    Praveena Ortiz RN

## 2024-09-10 NOTE — PLAN OF CARE
Physical Therapy Discharge Summary    Reason for therapy discharge:    All goals and outcomes met, no further needs identified.    Progress towards therapy goal(s). [facility] for goal details.  Goals met    Therapy recommendation(s):    No further therapy is recommended.        Christina Yi, PT, DPT  9/10/2024

## 2024-09-10 NOTE — PLAN OF CARE
Problem: Adult Inpatient Plan of Care  Goal: Plan of Care Review  Description: The Plan of Care Review/Shift note should be completed every shift.  The Outcome Evaluation is a brief statement about your assessment that the patient is improving, declining, or no change.  This information will be displayed automatically on your shift  note.  Outcome: Progressing     Problem: Adult Inpatient Plan of Care  Goal: Absence of Hospital-Acquired Illness or Injury  Outcome: Progressing     Problem: Adult Inpatient Plan of Care  Goal: Absence of Hospital-Acquired Illness or Injury  Intervention: Prevent Skin Injury  Recent Flowsheet Documentation  Taken 9/10/2024 0800 by Vish Ocampo, RN  Body Position: position changed independently  Device Skin Pressure Protection: absorbent pad utilized/changed     Problem: Comorbidity Management  Goal: Blood Pressure in Desired Range  Intervention: Maintain Blood Pressure Management  Recent Flowsheet Documentation  Taken 9/10/2024 0800 by Vish Ocampo, RN  Medication Review/Management: medications reviewed   Goal Outcome Evaluation:  Patient received PRN IV Dilaudid 0.4 mg for leg pain which was effective, wound dressings changed and Lymph wraps applied,

## 2024-09-11 ENCOUNTER — APPOINTMENT (OUTPATIENT)
Dept: OCCUPATIONAL THERAPY | Facility: HOSPITAL | Age: 79
DRG: 872 | End: 2024-09-11
Attending: INTERNAL MEDICINE
Payer: COMMERCIAL

## 2024-09-11 VITALS
BODY MASS INDEX: 48.24 KG/M2 | WEIGHT: 255.29 LBS | DIASTOLIC BLOOD PRESSURE: 63 MMHG | SYSTOLIC BLOOD PRESSURE: 139 MMHG | HEART RATE: 79 BPM | RESPIRATION RATE: 16 BRPM | OXYGEN SATURATION: 94 % | TEMPERATURE: 98.1 F

## 2024-09-11 PROCEDURE — 97535 SELF CARE MNGMENT TRAINING: CPT | Mod: GO

## 2024-09-11 PROCEDURE — 250N000011 HC RX IP 250 OP 636: Performed by: INTERNAL MEDICINE

## 2024-09-11 PROCEDURE — 99239 HOSP IP/OBS DSCHRG MGMT >30: CPT | Performed by: INTERNAL MEDICINE

## 2024-09-11 PROCEDURE — 250N000013 HC RX MED GY IP 250 OP 250 PS 637: Performed by: INTERNAL MEDICINE

## 2024-09-11 RX ORDER — MINOCYCLINE HYDROCHLORIDE 100 MG/1
100 CAPSULE ORAL EVERY 12 HOURS
Qty: 18 CAPSULE | Refills: 0 | Status: SHIPPED | OUTPATIENT
Start: 2024-09-11 | End: 2024-09-20

## 2024-09-11 RX ADMIN — CITALOPRAM HYDROBROMIDE 40 MG: 20 TABLET ORAL at 12:28

## 2024-09-11 RX ADMIN — GABAPENTIN 100 MG: 100 CAPSULE ORAL at 13:03

## 2024-09-11 RX ADMIN — GABAPENTIN 100 MG: 100 CAPSULE ORAL at 09:25

## 2024-09-11 RX ADMIN — MEROPENEM 1 G: 1 INJECTION, POWDER, FOR SOLUTION INTRAVENOUS at 12:44

## 2024-09-11 RX ADMIN — ROPINIROLE HYDROCHLORIDE 0.5 MG: 0.25 TABLET, FILM COATED ORAL at 12:28

## 2024-09-11 RX ADMIN — FUROSEMIDE 20 MG: 20 TABLET ORAL at 09:25

## 2024-09-11 RX ADMIN — MEROPENEM 1 G: 1 INJECTION, POWDER, FOR SOLUTION INTRAVENOUS at 06:29

## 2024-09-11 RX ADMIN — MINOCYCLINE HYDROCHLORIDE 100 MG: 100 CAPSULE ORAL at 09:24

## 2024-09-11 RX ADMIN — FERROUS SULFATE TAB 325 MG (65 MG ELEMENTAL FE) 325 MG: 325 (65 FE) TAB at 09:24

## 2024-09-11 RX ADMIN — HEPARIN SODIUM 5000 UNITS: 10000 INJECTION, SOLUTION INTRAVENOUS; SUBCUTANEOUS at 13:04

## 2024-09-11 RX ADMIN — BUPROPION HYDROCHLORIDE 150 MG: 150 TABLET, FILM COATED, EXTENDED RELEASE ORAL at 09:23

## 2024-09-11 RX ADMIN — MAGNESIUM OXIDE TAB 400 MG (241.3 MG ELEMENTAL MG) 400 MG: 400 (241.3 MG) TAB at 09:25

## 2024-09-11 RX ADMIN — ASPIRIN 81 MG CHEWABLE TABLET 81 MG: 81 TABLET CHEWABLE at 09:24

## 2024-09-11 RX ADMIN — WHITE PETROLATUM: 1.75 OINTMENT TOPICAL at 09:23

## 2024-09-11 RX ADMIN — HEPARIN SODIUM 5000 UNITS: 10000 INJECTION, SOLUTION INTRAVENOUS; SUBCUTANEOUS at 06:29

## 2024-09-11 RX ADMIN — SPIRONOLACTONE 25 MG: 25 TABLET, FILM COATED ORAL at 09:24

## 2024-09-11 RX ADMIN — ROPINIROLE HYDROCHLORIDE 0.5 MG: 0.25 TABLET, FILM COATED ORAL at 09:24

## 2024-09-11 RX ADMIN — PANTOPRAZOLE SODIUM 40 MG: 40 TABLET, DELAYED RELEASE ORAL at 09:24

## 2024-09-11 RX ADMIN — HYDROMORPHONE HYDROCHLORIDE 1 MG: 2 TABLET ORAL at 06:41

## 2024-09-11 ASSESSMENT — ACTIVITIES OF DAILY LIVING (ADL)
ADLS_ACUITY_SCORE: 37
ADLS_ACUITY_SCORE: 37
ADLS_ACUITY_SCORE: 34
ADLS_ACUITY_SCORE: 37
ADLS_ACUITY_SCORE: 37
ADLS_ACUITY_SCORE: 38
ADLS_ACUITY_SCORE: 37
ADLS_ACUITY_SCORE: 38
ADLS_ACUITY_SCORE: 37
ADLS_ACUITY_SCORE: 38
ADLS_ACUITY_SCORE: 37

## 2024-09-11 NOTE — PLAN OF CARE
Problem: Adult Inpatient Plan of Care  Goal: Plan of Care Review  Description: The Plan of Care Review/Shift note should be completed every shift.  The Outcome Evaluation is a brief statement about your assessment that the patient is improving, declining, or no change.  This information will be displayed automatically on your shift  note.  Outcome: Met     Problem: Adult Inpatient Plan of Care  Goal: Absence of Hospital-Acquired Illness or Injury  Outcome: Met     Problem: Risk for Delirium  Goal: Optimal Coping  Outcome: Met     Problem: Risk for Delirium  Goal: Improved Behavioral Control  Intervention: Minimize Safety Risk  Recent Flowsheet Documentation  Taken 9/11/2024 0800 by Vish Ocampo RN  Enhanced Safety Measures: pain management   Goal Outcome Evaluation:  Patient alert oriented x 4, able to express needs, wound dressing change completed, Lymphedema wraps in place, continue IV ABX's. Patient will discharge home with belongings at 1400, Home infusion teaching will be done before discharge.

## 2024-09-11 NOTE — PROGRESS NOTES
Care Management Follow Up    Length of Stay (days): 7    Expected Discharge Date: 09/12/2024    Anticipated Discharge Plan:       Transportation: Anticipate Family/friend    PT Recommendations: home with assist  OT Recommendations:  home with assist     Barriers to Discharge:  Finalizing plan for home infusion    Prior Living Situation: house with significant other    Discussed  Partnership in Safe Discharge Planning  document with patient/family: No     Handoff Completed: No, handoff not indicated or clinically appropriate    Patient/Spokesperson Updated: Yes. Who? Carolina    Additional Information:  Patient anticipates discharging to home with home infusion and resumed home care services. Per [facility], they expressed concern about patient managing her home IV infusions. Update received from MD, writer met with patient. Patient is alert and oriented; ordinarily works outside the home but is not working currently. She has had home infusions in the past and is comfortable managing them going forward. She will be on a CADD pump for this current plan (which writer explained to patient). Update provided to [facility] nurse liaison.   9:21 AM: Note discharge orders are on the chart but home care and home infusion have not yet been confirmed. [facility] will confirm home care agency and follow up with patient.     Next Steps: [facility] to complete education for home infusion.     Morelia Gee RN

## 2024-09-11 NOTE — PROGRESS NOTES
[facility]    Met with patient at bedside for teach of IV meropenem for 7 more days  via CADD pump. Plan is for patient to discharge today.    Provided hands-on teaching using teaching mats and demo equipment. Patient taught SAS method and was able to provide return demonstration using aseptic technique. Discussed med delivery and storage, as well as instructed on CADD pump use. Extension tubing was added to PICC. IV catheter flushed without resistance and had good blood return. Delivery of med and supplies will be delivered to patient's home by the end of the day today. A nurse  with [facility] will contact patient to set up home nursing visit for start of care, planned for 9/13 .    Provided 24/7 phone number and answered all questions.  Patient verbalized understanding of discharge plans.    Thank you for the opportunity to provide infusion services to this patient.    Talita Adames RN  [facility]  [phone] (Monday through Friday, 8am-5pm)  [phone] (office)

## 2024-09-11 NOTE — PROGRESS NOTES
[facility]  Infectious Disease Progress Note       09/10/2024      Assessment & Plan   right lower extremity recurrent cellulitis- Pseudomonas, MSSA, Proteus, Stenotrophomonas  Sepsis- improving  Prior cellulitis episode was in July   risk factors obesity, lymphedema with open wounds,  Venous insufficiency  Leukocytosis improved 21K to 14 K to 7 K  Fever resolved quickly       Susceptibility data from last 90 days.  Collected Specimen Info Organism Ampicillin Ampicillin/Sulbactam Cefepime Ceftazidime Ceftriaxone Ciprofloxacin Clindamycin Daptomycin Doxycycline Erythromycin Gentamicin Levofloxacin Meropenem Minocycline Oxacillin   09/05/24 Wound from Leg, Right Pseudomonas aeruginosa    S  S   S       S  S       Stenotrophomonas maltophilia             I   S      Proteus mirabilis  S  S  S  S  S  S      S  S  S       Staphylococcus aureus       R  S  R  R  S     S     Candida parapsilosis complex                  07/16/24 Urine, NOS Urogenital trang                    Collected Specimen Info Organism Piperacillin/Tazobactam Tetracycline Tobramycin Trimethoprim/Sulfamethoxazole  Vancomycin   09/05/24 Wound from Leg, Right Pseudomonas aeruginosa  S   S       Stenotrophomonas maltophilia     S      Proteus mirabilis  S   S  S      Staphylococcus aureus   R   R  S     Candida parapsilosis complex        07/16/24 Urine, NOS Urogenital trang               PLAN    Discontinued vancomycin  Meropenem x 7 days via CADD on discharge plus PO Minocycline x 10 days  PICC  Leg elevation  Wound care  Patient has done IV abx at home in the past including multiple times  Please see previous ID notes by Blayne Parham M.D.  Updated patient and hospitalist. ID will sign off. Please call with questions      Kathe Vargas MD  [facility]  Answering Service: [phone]  On-Call ID provider: [phone], option:  9    ______________________________________________________________________    Interval History   IV infiltrated- PICC ordered- doing well. Patient has done IV abx at home in 2021 and has help  Dressing     Previous note    Dressing change with nurse-- painful  Healing    Previous note: She was worried rash she had chest tightness with high blood pressure, addressed by hospitalist      Past medical, family, and social history reviewed, unchanged from before.  All 12 systems reviewed, negative except for the above.      Physical Exam   Vital Signs: Temp: 98.3  F (36.8  C) Temp src: Oral BP: (!) 144/58 Pulse: 82   Resp: 18 SpO2: 92 % O2 Device: None (Room air)    Weight: 255 lbs 4.68 oz  \Gen. appearance nontoxic  Eyes no conjunctivitis or icterus  Neck no stiffness   Heartedema  Lungs no sob  Abdomen soft not tender  Extremities no synovitis,RLE massive lymphedema  And erythema mostly below knee , better  Above knee erythema faded>resolved  Superificila wounds, lots of weeping  Skin  erythema, superficial ulceration  Neurologic alert oriented no focal deficits      Previous notes             Data   Results for orders placed or performed during the hospital encounter of 09/04/24 (from the past 24 hour(s))   Platelet count   Result Value Ref Range    Platelet Count 320 150 - 450 10e3/uL   Basic metabolic panel   Result Value Ref Range    Sodium 138 135 - 145 mmol/L    Potassium 4.3 3.4 - 5.3 mmol/L    Chloride 100 98 - 107 mmol/L    Carbon Dioxide (CO2) 31 (H) 22 - 29 mmol/L    Anion Gap 7 7 - 15 mmol/L    Urea Nitrogen 16.1 8.0 - 23.0 mg/dL    Creatinine 0.90 0.51 - 0.95 mg/dL    GFR Estimate 66 >60 mL/min/1.73m2    Calcium 8.9 8.8 - 10.4 mg/dL    Glucose 92 70 - 99 mg/dL   Hemoglobin   Result Value Ref Range    Hemoglobin 10.2 (L) 11.7 - 15.7 g/dL

## 2024-09-11 NOTE — PLAN OF CARE
Problem: Adult Inpatient Plan of Care  Goal: Optimal Comfort and Wellbeing  Outcome: Progressing   Goal Outcome Evaluation:  No new issues overnight.  Slept well.  Dilaudid given this am for RLE pain.

## 2024-09-11 NOTE — PLAN OF CARE
Problem: Adult Inpatient Plan of Care  Goal: Plan of Care Review  Description: The Plan of Care Review/Shift note should be completed every shift.  The Outcome Evaluation is a brief statement about your assessment that the patient is improving, declining, or no change.  This information will be displayed automatically on your shift  note.  Outcome: Progressing  Flowsheets (Taken 9/10/2024 9528)  Outcome Evaluation: Discharge most likely tomorrow. Awaiting finalized ID order for IV abx and wound care orders. Home care and infusion aware and dischargecommunication already initiated with them but not yet finalized. Patient receiving PO dilaudid for pain to RLE. VSS. RA. Good appetite. Good urine output in purewick.   Goal Outcome Evaluation:                 Outcome Evaluation: Discharge most likely tomorrow. Awaiting finalized ID order for IV abx and wound care orders. Home care and infusion aware and dischargecommunication already initiated with them but not yet finalized. Patient receiving PO dilaudid for pain to RLE. VSS. RA. Good appetite. Good urine output in purewick.

## 2024-09-11 NOTE — PROGRESS NOTES
CLINICAL NUTRITION SERVICES    Reviewed nutrition risk factors due to LOS. Pt is tolerating diet, eating well per nursing documentation. Pt ordering 3 nutritionally balanced meal/days. Admit weight is stable.  No nutrition issues identified at this time. RD will follow via rounds at this time, unless consulted.

## 2024-09-11 NOTE — PLAN OF CARE
Lymphedema Discharge Summary    Reason for therapy discharge:    Discharged to home.    Progress towards therapy goal(s). See goals on Care Plan in [facility] electronic health record for goal details.  Goals partially met.  Barriers to achieving goals:   discharge from facility.    Therapy recommendation(s):    Continued lymphedema treatment    Anika MCCRAY, OTR/L, CLT 9/11/2024 , 11:46 AM      Goal Outcome Evaluation:

## 2024-09-11 NOTE — PROGRESS NOTES
Care Management Discharge Note    Discharge Date: 09/11/2024     Discharge Disposition:  Home    Discharge Services:  Home infusion; home care (resumed)    Discharge DME:  Per therapy (if indicated).    Discharge Transportation: family or friend will provide    Private pay costs discussed: Not applicable at this time    Does the patient's insurance plan have a 3 day qualifying hospital stay waiver?  No    PAS Confirmation Code:  NA  Patient/family educated on Medicare website which has current facility and service quality ratings:  Yes    Education Provided on the Discharge Plan:  Yes  Persons Notified of Discharge Plans: Patient; nursing; home infusion; home care;  Patient/Family in Agreement with the Plan:  Yes    Handoff Referral Completed: No, handoff not indicated or clinically appropriate    Additional Information:  [facility] can accept patient for resumed home skilled nursing. [facility] will come to the hospital today to complete the education on home infusion. Update provided to patient who is please with the plan. Her significant other, Jaleel, will provide transport.    Morelia Gee RN

## 2024-09-11 NOTE — DISCHARGE SUMMARY
[facility]  Hospitalist Discharge Summary      Date of Admission:  9/4/2024  Date of Discharge:  No discharge date for patient encounter.  Discharging Provider: Jhonatan Suh MD  Discharge Service: Hospitalist Service    Discharge Diagnoses   Sepsis due to cellulitis of right lower extremity, resolved  Chronic right lower extremity ulcers continue wound dressing at home  History of lymphedema lower extremities  GERD stable  Hypertension controlled  Hyperlipidemia controlled  High BMI PCP follow-up recommended  Depression continue home meds stable  Generalized weakness history of MS PT OT if needed  Anemia this is chronic continue home cirrhosis  PICC line placed for IV access, remove per ID    Hospital course    Elizabeth Kelley is 79 year old female with history of chronic right lower extremity venous ulcer, lymphedema of bilateral lower extremities, GERD, HTN, HLD, multiple sclerosis, restless legs, morbid obesity and depression admitted on 9/4/24 for evaluation of fever and generalized weakness and found to have sepsis related to RLE cellulitis. Lower extremity venous ultrasound was negative for deep venous thrombosis.  ID service is assisting with antibiotic therapy.   Broad-spectrum IV vancomycin and Zosyn was started, wound therapy was consulted, subsequent wound culture showed Pseudomonas, MSSA, Proteus,stenotrophomonas , antibiotics changed by ID to iv meropenem for 7 more days on discharge plus p.o. minocycline for 10 days.  No cultures to date on blood cultures.    Patient has been seen by wound care and  for discharge to home with IV antibiotic services/infusion care.  She will have wound care at home as well.  Follow-up in wound care clinic and primary care for further recommendations per discharge    Clinically Significant Risk Factors     # Severe Obesity: Estimated body mass index is 48.24 kg/m  as calculated from the following:    Height as of an earlier  "encounter on 9/4/24: 1.549 m (5' 1\").    Weight as of this encounter: 115.8 kg (255 lb 4.7 oz).       Follow-ups Needed After Discharge   Follow-up Appointments     Follow-up and recommended labs and tests       Follow up with primary care provider, Physician No Ref-Primary, within 7   days for hospital follow- up.  The following labs/tests are recommended:   CBC/BMP.    Follow up with [facility]  , at (location with clinic name or   city) , within 1-2 WKS   for hospital follow- up. No follow up labs or   test are needed.  FUP WITH YOUR REGULAR WOUND CLINIC 1-2 WKS            Unresulted Labs Ordered in the Past 30 Days of this Admission       No orders found from 8/5/2024 to 9/5/2024.        These results will be followed up by     Discharge Disposition   Discharged to home with wound care and infusion care  Condition at discharge: Stable    Hospital Course       Consultations This Hospital Stay   PHARMACY TO DOSE VANCO  INFECTIOUS DISEASES IP CONSULT  LYMPHEDEMA THERAPY IP CONSULT  PHYSICAL THERAPY ADULT IP CONSULT  OCCUPATIONAL THERAPY ADULT IP CONSULT  WOUND OSTOMY CONTINENCE NURSE  IP CONSULT  CARE MANAGEMENT / SOCIAL WORK IP CONSULT  VASCULAR ACCESS ADULT IP CONSULT  VASCULAR ACCESS ADULT IP CONSULT  PHARMACY IP CONSULT    Code Status   Full Code    Time Spent on this Encounter   I, Jhonatan Suh MD, personally saw the patient today and spent greater than 30 minutes discharging this patient.       Jhonatan Suh MD  [address] [address]  Phone: [phone]  Fax: [phone]  ______________________________________________________________________    Physical Exam   Vital Signs: Temp: 98.1  F (36.7  C) Temp src: Oral BP: 139/63 Pulse: 79   Resp: 16 SpO2: 94 % O2 Device: None (Room air)    Weight: 255 lbs 4.68 oz  General appearance: AAOx3  Lungs: Clear to auscultation bilaterally,   Cardiovascular: Regular Rate and Rythm, normal apical " impulse, normal S1 and S2, bilateral edema  Abdomen: Soft, non-tender and Non-distended, active bowel sounds  Musculoskeletal: Bilateral edema noted, right leg was dressed this was not examined       Primary Care Physician   Physician No Ref-Primary    Discharge Orders      Home Care Referral      Home Infusion Referral      Reason for your hospital stay    RLE cellulitis     Follow-up and recommended labs and tests     Follow up with primary care provider, Physician No Ref-Primary, within 7 days for hospital follow- up.  The following labs/tests are recommended: CBC/BMP.    Follow up with [facility]  , at (location with clinic name or city) , within 1-2 WKS   for hospital follow- up. No follow up labs or test are needed.  FUP WITH YOUR REGULAR WOUND CLINIC 1-2 WKS     Activity    Your activity upon discharge: activity as tolerated     Discharge Instructions    Discontinue WITH CADD PU[facility] FOR IV ABX- CARE MANAGER TO ARRANGE     Wound care and dressings    Instructions to care for your wound at home: 3X WEEKLY OR PER WOUND CARE CLINIC RECS .SEE WOC NOTE FOR WOUND CARE RECS TO BE CONTINUED AT HOME     Diet    Follow this diet upon discharge: Current Diet:Orders Placed This Encounter      Regular Diet Adult       Significant Results and Procedures   Results for orders placed or performed during the hospital encounter of 09/04/24   US Lower Extremity Venous Duplex Right    Narrative    EXAM: US LOWER EXTREMITY VENOUS DUPLEX RIGHT  LOCATION: [facility]  DATE: 9/5/2024    INDICATION: Asymmetric lower extremity swelling, eval for DVT  COMPARISON: Lower extremity venous ultrasound dated 7/19/2024  TECHNIQUE: Venous Duplex ultrasound of the right lower extremity with and without compression, augmentation and duplex. Color flow and spectral Doppler with waveform analysis performed.    FINDINGS: Exam includes the common femoral, femoral, popliteal, and contralateral common femoral veins as  well as segmentally visualized deep calf veins and greater saphenous vein. Nonvisualization of the posterior tibial and peroneal veins secondary to   edema/swelling.    RIGHT: No deep vein thrombosis. No superficial thrombophlebitis. No popliteal cyst. Prominent right groin lymph nodes.      Impression    IMPRESSION:  1.  No deep venous thrombosis in the right lower extremity.   XR Chest Port 1 View    Narrative    EXAM: XR CHEST PORT 1 VIEW  LOCATION: [facility]  DATE: 9/7/2024    INDICATION: increased cough and chest tightness  COMPARISON: Chest radiograph 9/4/2024.       Impression    IMPRESSION:     Slightly elevated right hemidiaphragm. No evidence of pneumonia. No pleural effusions or pneumothorax. Normal pulmonary vascularity.     Stable, nonenlarged cardiac silhouette. Moderate hiatal hernia.    Degenerative changes of both shoulders.   CT Chest w/o Contrast    Narrative    EXAM: CT CHEST W/O CONTRAST  LOCATION: [facility]  DATE: 9/7/2024    INDICATION: Chest discomfort, hypoxia on admission? Infectious process versus pulmonary edema.  COMPARISON: None.  TECHNIQUE: CT chest without IV contrast. Multiplanar reformats were obtained. Dose reduction techniques were used.  CONTRAST: None.    FINDINGS:   LUNGS AND PLEURA: Trace atelectasis and/or fibrosis. No consolidation or effusion. Mild-moderate bronchial wall thickening and bronchiectasis.    MEDIASTINUM/AXILLAE: Small to moderate hiatal hernia. Mildly enlarged anterior mediastinal nodes with representative node measuring 2.4 x 1.0 cm. Supraclavicular node measuring 1.4 x 1.9 cm on the left. No aneurysm.    CORONARY ARTERY CALCIFICATION: None.    UPPER ABDOMEN: No significant finding.    MUSCULOSKELETAL: No frankly destructive bony lesions.      Impression    IMPRESSION:   1.  Mild-moderate bronchial wall thickening and bronchiectasis.  2.  Mild nonspecific adenopathy.  3.  Small to moderate hiatal hernia.          Discharge Medications   Current Discharge Medication List        START taking these medications    Details   meropenem (MERREM) 1 g vial Inject 1,000 mg (1 g) over 30 minutes into the vein every 8 hours for 7 days.    Comments: Weekly labs: CBC with diff, CMP, ESR, CRP. Fax to Kathe Vargas MD at [phone] Infectious Disease. PICC line cares.  Associated Diagnoses: Cellulitis of right lower extremity; Sepsis due to Pseudomonas species with acute respiratory failure, unspecified whether hypoxia or hypercapnia present, unspecified whether septic shock present (H)      minocycline (MINOCIN) 100 MG capsule Take 1 capsule (100 mg) by mouth every 12 hours for 9 days.  Qty: 18 capsule, Refills: 0    Associated Diagnoses: Cellulitis of right lower extremity           CONTINUE these medications which have NOT CHANGED    Details   !! acetaminophen (TYLENOL) 500 MG tablet Take 500 mg by mouth 2 times daily. 500 mg in the morning and 500 mg at noon      !! acetaminophen (TYLENOL) 500 MG tablet Take 1,000 mg by mouth every evening.      ascorbic acid (VITAMIN C) 250 MG CHEW chewable tablet Take 250 mg by mouth daily.      aspirin (ASPIRIN 81) 81 MG chewable tablet Take 1 tablet by mouth every morning      Bioflavonoid Products (CINDY-C/BIOFLAVONOIDS PO) Take 1 tablet by mouth every evening.      buPROPion (WELLBUTRIN SR) 150 MG 12 hr tablet Take 150 mg by mouth every morning       Carboxymethylcellulose Sodium 0.25 % SOLN Apply 1 drop to eye daily as needed      citalopram (CELEXA) 40 MG tablet Take 40 mg by mouth daily (with lunch)      ferrous sulfate (FEROSUL) 325 (65 Fe) MG tablet Take 325 mg by mouth three times a week      furosemide (LASIX) 20 MG tablet Take 20 mg by mouth 2 times daily      gabapentin (NEURONTIN) 100 MG capsule Take 100 mg by mouth 3 times daily      magnesium oxide (MAG-OX) 400 MG tablet Take 1 tablet by mouth every morning      multivitamin w/minerals (CENTRUM ADULTS) tablet Take 1 tablet by  Quality 111:Pneumonia Vaccination Status For Older Adults: Pneumococcal Vaccination Previously Received mouth every morning      nitroGLYcerin (NITROSTAT) 0.4 MG sublingual tablet PLACE 1 TABLET UNDER TONGUE EVERY 5 MINTUES AS NEEDED FOR CHEST PAIN FOR UP TO 30 DAYS      pantoprazole (PROTONIX) 40 MG EC tablet Take 40 mg by mouth every morning      potassium chloride ER (K-TAB) 20 MEQ CR tablet Take 20 mEq by mouth every evening.      !! rOPINIRole (REQUIP) 1 MG tablet Take 0.5-1 mg by mouth daily as needed. At noon      !! rOPINIRole (REQUIP) 1 MG tablet Take 0.5 mg by mouth every morning In addition, patient takes one tablet (1 mg) in the evening      !! rOPINIRole (REQUIP) 1 MG tablet Take 1 mg by mouth every evening In addition, patient takes one half tablet (0.5 mg) in the morning      simvastatin (ZOCOR) 40 MG tablet [SIMVASTATIN (ZOCOR) 40 MG TABLET] Take 40 mg by mouth bedtime.      spironolactone (ALDACTONE) 25 MG tablet Take 25 mg by mouth every morning      vitamin B-Complex Take 1 tablet by mouth daily  Qty: 60 tablet, Refills: 3    Associated Diagnoses: Ulcer of right lower extremity with fat layer exposed (H)      vitamin D3 (CHOLECALCIFEROL) 250 mcg (81343 units) capsule Take 1 capsule (250 mcg) by mouth daily  Qty: 60 capsule, Refills: 3    Associated Diagnoses: Ulcer of right lower extremity with fat layer exposed (H)      Wound Cleansers (VASHE WOUND THERAPY EX) Externally apply topically daily as needed      zinc 50 MG TABS Take 1 tablet by mouth every morning       !! - Potential duplicate medications found. Please discuss with provider.        Allergies   Allergies   Allergen Reactions    Other Environmental Allergy Anaphylaxis     Bees/Wasps Stings  Bees/Wasps Stings    Adhesive Tape Other (See Comments)     Red,itchy and oozes  Red,itchy and oozes    Adhesive [Cyanoacrylate] Unknown     Other reaction(s): sores    Latex Hives    Ragweeds Itching    Oxycodone-Acetaminophen Rash    Vicodin [Hydrocodone-Acetaminophen] Nausea and Vomiting and Hives      Detail Level: Detailed Quality 110: Preventive Care And Screening: Influenza Immunization: Influenza Immunization Administered during Influenza season Quality 226: Preventive Care And Screening: Tobacco Use: Screening And Cessation Intervention: Patient screened for tobacco use and is an ex/non-smoker

## 2024-09-13 ENCOUNTER — PATIENT OUTREACH (OUTPATIENT)
Dept: CARE COORDINATION | Facility: CLINIC | Age: 79
End: 2024-09-13
Payer: COMMERCIAL

## 2024-09-13 ENCOUNTER — LAB REQUISITION (OUTPATIENT)
Dept: LAB | Facility: HOSPITAL | Age: 79
End: 2024-09-13
Payer: COMMERCIAL

## 2024-09-13 DIAGNOSIS — B95.62 METHICILLIN RESISTANT STAPHYLOCOCCUS AUREUS INFECTION AS THE CAUSE OF DISEASES CLASSIFIED ELSEWHERE: ICD-10-CM

## 2024-09-13 LAB
ALBUMIN SERPL BCG-MCNC: 3.3 G/DL (ref 3.5–5.2)
ALP SERPL-CCNC: 181 U/L (ref 40–150)
ALT SERPL W P-5'-P-CCNC: 23 U/L (ref 0–50)
ANION GAP SERPL CALCULATED.3IONS-SCNC: 11 MMOL/L (ref 7–15)
AST SERPL W P-5'-P-CCNC: 16 U/L (ref 0–45)
BASOPHILS # BLD AUTO: 0.1 10E3/UL (ref 0–0.2)
BASOPHILS NFR BLD AUTO: 1 %
BILIRUB SERPL-MCNC: 0.2 MG/DL
BUN SERPL-MCNC: 27.6 MG/DL (ref 8–23)
CALCIUM SERPL-MCNC: 9.1 MG/DL (ref 8.8–10.4)
CHLORIDE SERPL-SCNC: 100 MMOL/L (ref 98–107)
CREAT SERPL-MCNC: 0.97 MG/DL (ref 0.51–0.95)
CRP SERPL-MCNC: 66.7 MG/L
EGFRCR SERPLBLD CKD-EPI 2021: 60 ML/MIN/1.73M2
EOSINOPHIL # BLD AUTO: 0.2 10E3/UL (ref 0–0.7)
EOSINOPHIL NFR BLD AUTO: 3 %
ERYTHROCYTE [DISTWIDTH] IN BLOOD BY AUTOMATED COUNT: 15.2 % (ref 10–15)
ERYTHROCYTE [SEDIMENTATION RATE] IN BLOOD BY WESTERGREN METHOD: 80 MM/HR (ref 0–30)
GLUCOSE SERPL-MCNC: 81 MG/DL (ref 70–99)
HCO3 SERPL-SCNC: 30 MMOL/L (ref 22–29)
HCT VFR BLD AUTO: 31 % (ref 35–47)
HGB BLD-MCNC: 9.7 G/DL (ref 11.7–15.7)
IMM GRANULOCYTES # BLD: 0.1 10E3/UL
IMM GRANULOCYTES NFR BLD: 1 %
LYMPHOCYTES # BLD AUTO: 1.5 10E3/UL (ref 0.8–5.3)
LYMPHOCYTES NFR BLD AUTO: 19 %
MCH RBC QN AUTO: 26.8 PG (ref 26.5–33)
MCHC RBC AUTO-ENTMCNC: 31.3 G/DL (ref 31.5–36.5)
MCV RBC AUTO: 86 FL (ref 78–100)
MONOCYTES # BLD AUTO: 0.8 10E3/UL (ref 0–1.3)
MONOCYTES NFR BLD AUTO: 10 %
NEUTROPHILS # BLD AUTO: 5.3 10E3/UL (ref 1.6–8.3)
NEUTROPHILS NFR BLD AUTO: 67 %
NRBC # BLD AUTO: 0 10E3/UL
NRBC BLD AUTO-RTO: 0 /100
PLATELET # BLD AUTO: 381 10E3/UL (ref 150–450)
POTASSIUM SERPL-SCNC: 4.2 MMOL/L (ref 3.4–5.3)
PROT SERPL-MCNC: 7 G/DL (ref 6.4–8.3)
RBC # BLD AUTO: 3.62 10E6/UL (ref 3.8–5.2)
SODIUM SERPL-SCNC: 141 MMOL/L (ref 135–145)
WBC # BLD AUTO: 8 10E3/UL (ref 4–11)

## 2024-09-13 PROCEDURE — 85652 RBC SED RATE AUTOMATED: CPT | Performed by: INTERNAL MEDICINE

## 2024-09-13 PROCEDURE — 86140 C-REACTIVE PROTEIN: CPT | Performed by: INTERNAL MEDICINE

## 2024-09-13 PROCEDURE — 82565 ASSAY OF CREATININE: CPT | Performed by: INTERNAL MEDICINE

## 2024-09-13 PROCEDURE — 85025 COMPLETE CBC W/AUTO DIFF WBC: CPT | Performed by: INTERNAL MEDICINE

## 2024-09-13 NOTE — PROGRESS NOTES
[facility]: Transitions of Care Outreach  Chief Complaint   Patient presents with    Clinic Care Coordination - Post Hospital       Most Recent Admission Date: 9/4/2024   Most Recent Admission Diagnosis:      Most Recent Discharge Date: 9/11/2024   Most Recent Discharge Diagnosis: Pain associated with wound - T14.8XXA, R52  Ulcer of right lower extremity with fat layer exposed (H) - L97.912  Cellulitis of right lower extremity - L03.115  Sepsis due to Pseudomonas species with acute respiratory failure, unspecified whether hypoxia or hypercapnia present, unspecified whether septic shock present (H) - A41.52, R65.20, J96.00     Transitions of Care Assessment    Discharge Assessment  How are you doing now that you are home?: Evreything is going okay.  How are your symptoms? (Red Flag symptoms escalate to triage hotline per guidelines): Improved  Do you know how to contact your clinic care team if you have future questions or changes to your health status? : Yes  Does the patient have their discharge instructions? : Yes  Does the patient have questions regarding their discharge instructions? : No  Were you started on any new medications or were there changes to any of your previous medications? : Yes  Does the patient have all of their medications?: Yes  Do you have questions regarding any of your medications? : No                  Follow up Plan     Discharge Follow-Up  Discharge follow up appointment scheduled in alignment with recommended follow up timeframe or Transitions of Risk Category? (Low = within 30 days; Moderate= within 14 days; High= within 7 days): No (Pt is going to call and schedule her follow ups today.)  Patient's follow up appointment not scheduled: Patient declined scheduling support. Education on the importance of transitions of care follow up. Provided scheduling phone number.    No future appointments.    Outpatient Plan as outlined on AVS reviewed with patient.    For any  urgent concerns, please contact our 24 hour nurse triage line: [phone] ([phone])       AYDE Myers  [phone]  [facility]

## 2024-09-17 ENCOUNTER — MEDICAL CORRESPONDENCE (OUTPATIENT)
Dept: HEALTH INFORMATION MANAGEMENT | Facility: CLINIC | Age: 79
End: 2024-09-17
Payer: COMMERCIAL

## 2024-09-19 ENCOUNTER — OFFICE VISIT (OUTPATIENT)
Dept: VASCULAR SURGERY | Facility: CLINIC | Age: 79
End: 2024-09-19
Attending: FAMILY MEDICINE
Payer: COMMERCIAL

## 2024-09-19 VITALS — SYSTOLIC BLOOD PRESSURE: 143 MMHG | OXYGEN SATURATION: 95 % | DIASTOLIC BLOOD PRESSURE: 79 MMHG | HEART RATE: 82 BPM

## 2024-09-19 DIAGNOSIS — L97.912 ULCER OF RIGHT LOWER EXTREMITY WITH FAT LAYER EXPOSED (H): ICD-10-CM

## 2024-09-19 DIAGNOSIS — R60.0 LOCALIZED EDEMA: Primary | ICD-10-CM

## 2024-09-19 PROCEDURE — G0463 HOSPITAL OUTPT CLINIC VISIT: HCPCS | Performed by: FAMILY MEDICINE

## 2024-09-19 PROCEDURE — 99214 OFFICE O/P EST MOD 30 MIN: CPT | Performed by: FAMILY MEDICINE

## 2024-09-19 ASSESSMENT — PAIN SCALES - GENERAL: PAINLEVEL: MODERATE PAIN (5)

## 2024-09-19 NOTE — PROGRESS NOTES
Wound Clinic Note         Visit date: 09/19/2024       Cheif Complaint:     Elizabeth Kelley is a 79 year old   female had concerns including Wound Check.  The patient has lower extremity edema and right leg ulcers         HISTORY OF PRESENT ILLNESS:    Elizabeth Kelley reports the right leg wound has been present since 2020.  The wound began due to the area being bumped.     The patient denies a history of congestive heart failure, previous vein procedures or previous DVT.     I last saw this patient in the wound clinic on August 8, 2024.  At that time she had fairly recently finished antibiotics but felt that the infection was coming back again so I prescribed her an additional 14 days of doxycycline.  She reports that did end up helping but then later on September 4 she again developed symptoms of infection and was admitted to the hospital with sepsis.  She is now been discharged back home and has home health coming out 3 times a week to bandage the area.  She is just finished IV antibiotics and has 1 more day left of oral antibiotics.    The home health nurses have been bandaging the area with zinc oxide barrier cream applied to the periwound followed by Hydrofera Blue, superabsorbent pads and roll gauze.  They have not been applying any form of compression recently.  There is been moderate to heavy serous drainage from the wounds.  They have continued to be no open areas on the left leg recently.  She has also continue to wear her edema wear on the left leg.          The pateint denies fevers or chills.  They report the pain from the wound has been 0/10 and has remained about the same recently.      The patient reports laying down to elevate their legs above the level of their heart at least twice a day.      Since she is gone back home she has not gotten back into the habit of using the pneumatic lymphedema pump yet.    She confirms she takes Lasix once a day to help control her swelling.    Today the  patient reports maintaining a high protein diet, but has not been taking protein supplements lately.        The patient denies a history of diabetes, smoking or chronic steroid use.         The patient has not had any symptoms of infection relating to the wound recently and is not currently on antibiotics.       Problem List:   Past Medical History:   Diagnosis Date    Ankle contracture, left     Class 3 severe obesity due to excess calories without serious comorbidity with body mass index (BMI) of 45.0 to 49.9 in adult (H)     Combined gastric and duodenal ulcer     Controlled substance agreement broken     Depression     Dyslipidemia     Edema     Edema     Gait abnormality     GERD (gastroesophageal reflux disease)     Gout     Lymphedema of both lower extremities     Melanoma (H)     left upper arm    MS (multiple sclerosis) (H)     RLS (restless legs syndrome)     Skin ulcer of left lower leg (H)     Valgus deformity of both feet     Vitamin D deficiency              Family Hx: family history includes Arthritis in her maternal grandmother, maternal uncle, paternal grandfather, paternal grandmother, and sister; Asthma in her sister; Brain Cancer in her father; Breast Cancer in her paternal aunt; Colon Cancer in her paternal aunt; Early Death in her maternal grandfather; Hyperlipidemia in her mother, paternal aunt, and sister; Hypertension in her mother, paternal aunt, and sister; Multiple Sclerosis in her mother and sister; Obesity in her sister; Uterine Cancer in her mother.       Surgical Hx:   Past Surgical History:   Procedure Laterality Date    ABLATION SAPHENOUS VEIN W/ RFA Right 04/12/2021    Saphenous vein, anterior accessory saphenous vein, sclerotherapy of multiple veins.    COSMETIC SURGERY  1988    reduction of excess skin from weight loss    ESOPHAGOSCOPY, GASTROSCOPY, DUODENOSCOPY (EGD), COMBINED N/A 03/30/2015    Procedure: UPPER ENDOSCOPY with gastric biopsy;  Surgeon: Checo Fletcher MD;   Location: [facility];  Service:     IRRIGATION AND DEBRIDEMENT LOWER EXTREMITY, COMBINED Right 3/21/2022    Procedure: RIGHT LEG WOUND DEBRIDEMENT, PAULIE DERMATONE, MISONIX, VAC VERA FLOW WITH VASHE;  Surgeon: Gabriele Bullock MD;  Location:  OR    IRRIGATION AND DEBRIDEMENT LOWER EXTREMITY, COMBINED Right 3/28/2022    Procedure: DEBRIDEMENT AND WOUND DRESSING CHANGE AND MYRIAD APPLICATION OF RIGHT LOWER LEG WOUND;  Surgeon: Gabriele Bullock MD;  Location:  OR    MAMMOPLASTY REDUCTION Bilateral     MOHS MICROGRAPHIC PROCEDURE      left upper arm, melanoma    PICC DOUBLE LUMEN PLACEMENT  7/21/2024    PICC SINGLE LUMEN PLACEMENT  8/13/2021         PICC SINGLE LUMEN PLACEMENT  9/2/2021         PICC SINGLE LUMEN PLACEMENT  9/8/2024    SPINE SURGERY      L5 area surgery, decompression          Allergies:    Allergies   Allergen Reactions    Other Environmental Allergy Anaphylaxis     Bees/Wasps Stings  Bees/Wasps Stings    Adhesive Tape Other (See Comments)     Red,itchy and oozes  Red,itchy and oozes    Adhesive [Cyanoacrylate] Unknown     Other reaction(s): sores    Latex Hives    Ragweeds Itching    Oxycodone-Acetaminophen Rash    Vicodin [Hydrocodone-Acetaminophen] Nausea and Vomiting and Hives              Medication History:    Current Outpatient Medications   Medication Sig Dispense Refill    acetaminophen (TYLENOL) 500 MG tablet Take 500 mg by mouth 2 times daily. 500 mg in the morning and 500 mg at noon      acetaminophen (TYLENOL) 500 MG tablet Take 1,000 mg by mouth every evening.      ascorbic acid (VITAMIN C) 250 MG CHEW chewable tablet Take 250 mg by mouth daily.      aspirin (ASPIRIN 81) 81 MG chewable tablet Take 1 tablet by mouth every morning      Bioflavonoid Products (CINDY-C/BIOFLAVONOIDS PO) Take 1 tablet by mouth every evening.      buPROPion (WELLBUTRIN SR) 150 MG 12 hr tablet Take 150 mg by mouth every morning       Carboxymethylcellulose Sodium 0.25 % SOLN Apply 1 drop to eye daily  as needed      citalopram (CELEXA) 40 MG tablet Take 40 mg by mouth daily (with lunch)      ferrous sulfate (FEROSUL) 325 (65 Fe) MG tablet Take 325 mg by mouth three times a week      furosemide (LASIX) 20 MG tablet Take 20 mg by mouth 2 times daily      gabapentin (NEURONTIN) 100 MG capsule Take 100 mg by mouth 3 times daily      magnesium oxide (MAG-OX) 400 MG tablet Take 1 tablet by mouth every morning      minocycline (MINOCIN) 100 MG capsule Take 1 capsule (100 mg) by mouth every 12 hours for 9 days. 18 capsule 0    multivitamin w/minerals (CENTRUM ADULTS) tablet Take 1 tablet by mouth every morning      nitroGLYcerin (NITROSTAT) 0.4 MG sublingual tablet PLACE 1 TABLET UNDER TONGUE EVERY 5 MINTUES AS NEEDED FOR CHEST PAIN FOR UP TO 30 DAYS      pantoprazole (PROTONIX) 40 MG EC tablet Take 40 mg by mouth every morning      potassium chloride ER (K-TAB) 20 MEQ CR tablet Take 20 mEq by mouth every evening.      rOPINIRole (REQUIP) 1 MG tablet Take 0.5-1 mg by mouth daily as needed. At noon      rOPINIRole (REQUIP) 1 MG tablet Take 0.5 mg by mouth every morning In addition, patient takes one tablet (1 mg) in the evening      rOPINIRole (REQUIP) 1 MG tablet Take 1 mg by mouth every evening In addition, patient takes one half tablet (0.5 mg) in the morning      simvastatin (ZOCOR) 40 MG tablet [SIMVASTATIN (ZOCOR) 40 MG TABLET] Take 40 mg by mouth bedtime.      spironolactone (ALDACTONE) 25 MG tablet Take 25 mg by mouth every morning      vitamin B-Complex Take 1 tablet by mouth daily 60 tablet 3    vitamin D3 (CHOLECALCIFEROL) 250 mcg (45432 units) capsule Take 1 capsule (250 mcg) by mouth daily 60 capsule 3    Wound Cleansers (VASHE WOUND THERAPY EX) Externally apply topically daily as needed      zinc 50 MG TABS Take 1 tablet by mouth every morning       Current Facility-Administered Medications   Medication Dose Route Frequency Provider Last Rate Last Admin    oxyCODONE (ROXICODONE) tablet 5 mg  5 mg Oral Q6H  Omero Cooley MD             Tobacco History:  reports that she quit smoking about 48 years ago. Her smoking use included cigarettes. She started smoking about 60 years ago. She has a 3 pack-year smoking history. She has never used smokeless tobacco.       REVIEW OF SYMPTOMS:   The review of systems was negative except as noted in the HPI.           PHYSICAL EXAMINATION:     BP (!) 143/79   Pulse 82   SpO2 95%            GENERAL: The patient overall appears well and is no acute distress.   HEAD: normocephalic   EYES: Sclera and conjunctiva clear   NECK: no obvious masses   LUNGS: breathing is unlabored.   EXTREMITIES: No clubbing, cyanosis or edema   SKIN: No rashes or other abnormalities except as noted under the Wound section below.   NEUROLOGICAL: normal motor and sensory function   EDEMA: Sever  and Lymphedema       WOUND: The wound appears healthy with no sign of infection.   Wound bed: granulation tissue   Periwound: healthy intact skin  There are no open wounds on the left lower extremity today.  Compared with the pictures from when she was in the hospital of the right leg is much improved with many of the superficial open areas that she had previously having healed.    Also see below for wound details:       Circumferential volume measures:            7/5/2024     9:00 AM 7/11/2024    11:00 AM 7/15/2024    11:00 AM 7/25/2024    11:00 AM 7/29/2024     1:00 PM   Circumferential Measures   Right just above MTP 26 23.5 26 24 22.7   Right Ankle 26 22.5 26 25 22.5   Right Widest Calf 63 58 58.5 58 55.5   Left - just above MTP 23 26 22 23 22.1   Left Ankle 24 23.2 24 23.5 23.3   Left Widest Calf 61 58 46 52.3 45.7       Ulceration(s)/Wound(s):   Please see the media tab under the chart review for pictures of the wounds.  Nursing staff removed dressings and cleansed wound.    VASC Wound Rt lateral leg (Active)   Pre Size Length 8 09/19/24 1400   Pre Size Width 8 09/19/24 1400   Pre Size Depth 0.1 09/19/24  1400   Pre Total Sq cm 64 09/19/24 1400       VASC Wound right ankle (Active)   Pre Size Length 3 09/19/24 1400   Pre Size Width 5 09/19/24 1400   Pre Size Depth 0.1 09/19/24 1400   Description scattered 09/19/24 1400       VASC Wound right posterior leg (Active)   Pre Size Length 2.5 09/19/24 1400   Pre Size Width 0.6 09/19/24 1400   Pre Size Depth 0.1 09/19/24 1400   Pre Total Sq cm 1.5 09/19/24 1400                                       Recent Labs   Lab Test 04/04/22  0523 06/23/16  1130   A1C 5.4 5.9          Recent Labs   Lab Test 02/10/23  1535 05/25/22  1637 04/04/22  0523   ALBUMIN 4.3 4.1 1.9*      October 27, 2021 ankle-brachial indices: Normal  October 27, 2021 right lower extremity venous insufficiency ultrasound: Normal      No sharp debridement performed today.         ASSESSMENT:   This is a 79 year old  female with lower extremity edema and bilateral leg ulcers          PLAN:   We will send orders to the home health nurses asking them to bandage the right leg with zinc oxide barrier cream applied to the periwound followed by Hydrofera Blue, superabsorbent pads and lymphedema wrap change 3 times a week by the home health nurses.  If the lymphedema therapist is not able to place the lymphedema wrap they should use a multilayer compression wrap.    She previously had a bilateral lower extremity venous insufficiency ultrasound performed on August 3, 2023 which did show areas of superficial venous insufficiency.  She then later met with Dr. Floyd on November 13, 2023 to discuss treatment options for her venous insufficiency but he did not recommend any procedures at that time.    Separate from the discussion of her wound care we then discussed the treatment of her lower extremity edema.  I have encouraged the patient to continue to elevate the legs as they have been doing, including laying down with their legs above the level of the heart for 30 minutes at least twice a day.    I have encouraged her to  use the pneumatic lymphedema pump once a day.  I have encouraged the patient to continue on their high protein diet to aid in wound healing.   The patient will return to the wound clinic to see me in 3-4 weeks.        30 minutes spent on the date of the encounter doing chart review, history and exam, documentation and further activities per the note      Lars Torres MD  09/19/2024   2:14 PM   [facility]  [phone]    This note was electronically signed by Lars Torres MD

## 2024-09-19 NOTE — PATIENT INSTRUCTIONS
"Wound care supplies should be ordered by your home care agency. Please contact clinic if home care agency is not able to order supplies.       Wound Care Instructions       3 Times PER WEEK  and as needed, Cleanse your right leg wound(s) with 5 minute vashe soak.     Pat Dry with non-sterile gauze     Primary Dressing: Apply Apply a zinc oxide barrier cream to the entire leg than cover with hydrofera blue ready transfer into/onto the wounds     Secondary dressing: Cover with super absorbant or ABDs depending on drainage. Do not cut super absorbant.     Secure with non-sterile roll gauze (4\" x 75\" roll) and tape (1\" roll tape) as needed; avoid adhesive directly on the skin     Compression: left leg edema wear and right leg 4 layer compression wrap until you begin lymphedema therapy.       SEEK MEDICAL CARE IF:  You have an increase in swelling, pain, or redness around the wound.  You have an increase in the amount of pus coming from the wound.  There is a bad smell coming from the wound.  The wound appears to be worsening/enlarging  You have a fever greater than 101.5 F      It is ok to continue current wound care treatment/products for the next 2-3 days until new wound care supplies are ordered and arrive. If longer than this please contact our office at [phone].    If for some reason you are not able to get your dressing(s) changed as outlined above (due to illness, lack of supplies, lack of help) please do the following: remove old, soiled dressings; wash the wounds with saline; pat dry; apply ABD pad or other absorbant pad and secure with rolled gauze; avoid tape directly on your skin; Call the clinic as soon as possible to let us know what the current issues are in receiving wound care [phone].    If you have a 2 layer or 4 layer compression wrap on these are safe to have on for ONLY 7 days. If for some reason you are not able to get the wrap(s) changed (due to illness; lack of supplies, lack of help, " lack of transportation) please do the following: unwrap the old 2 or 4 layer compression wrap; avoid using scissors as you could cut your skin and cause wounds; use tubular compression when available. Call to reschedule your home care or clinic visit appointment as soon as possible.      It is recommended that you elevate your legs throughout the day: approx 2-3 times each day  Elevate them above the level of your heart for 30 min.   Ways to do this:   Lay on the couch or your bed and prop your legs up on pillows   Recline back as far as you can go in your recliner and prop your legs on pillows.     Doing these things will help reduce the edema in your legs.      High Protein Foods  When you have an open ulcer, your bodies protein needs are much higher, so it is recommended eat good sources of protein or take a protein supplement!    Protein Supplements  -Premier Protein  -Ensure  -Boost  -Glucerna, if diabetic    Chicken  -Chicken breast, 3.5oz.-30 grams protein  -Chicken meat, cooked, 4 oz.    Beef  -Hamburger frank, 4 oz-28 grams protein  -Steak, 6 oz-42 grams  -Most cuts of beef- 7 grams of protein per ounce    Fish  -Most fish fillets or steaks are about 22 grams of protein for 3 1/2 oz(100 grams) of cooked fish, or 6 grams per ounce  -Tuna, 6 oz can-40 grams of protein    Pork  -Pork chop, average-22 grams protein  -Pork loin or tenderloin, 4 oz.-29 grams  -Ham, 3oz serving- 19 grams  -Koo, 1 slice-3 grams    Eggs and Dairy  -Egg, large-7 grams  -Milk, 1 cup-8 grams  -Cottage cheese, 1/2 cup-15 grams  -Greek yogurt, 1 cup-usually 8-12 grams, check label    Beans  -Soy milk, 1 cup-6-10 grams  -Most beans(black, kenyon, lentils, etc.) about 7-10 grams protein per half cup of cooked beans    Nuts and Seeds  -Peanut butter, 2 Tablespoons- 8 grams protein  -Almonds, 1/4 cup- 8 grams  -Peanuts, 1/4 cup-9 grams  -Sunflower seeds, 1/4 cup- 6 grams      Please NOTE: if you are 15 minutes late to your clinic  appointment you will have to be rescheduled. Please call our clinic as soon as possible if you know you will not be able to get to your appointment at [phone].  If you fail to show up to 3 scheduled clinic appointments you will be dismissed from our clinic      We want to hear from you!  In the next few weeks, you should receive a call or email to complete a survey about your visit at [facility]. Please help us improve your appointment experience by letting us know how we did today. We strive to make your experience good and value any ways in which we could do better.      We value your input and suggestions.    Thank you for choosing the [facility]!

## 2024-09-19 NOTE — PROGRESS NOTES
Compression Applied to the Left Leg:  edema wear    Compression Applied to the Right Le-Layer    4-Layer: Applied orthopedic wool without tension in aloose spiral from base of toes to knee joint with 50% overlap. Applied the light support bandage in spiral from toe toknee with 50% overlap with 50% overlap. Applied elastic conformable compression bandage at mid stretch (50%) in a figure of eight from toe to knee,with a 50% overlap. Applied cohesive extensible bandage at mid stretch (50%) in a spiral with a 50 % overlap from toe to knee.

## 2024-09-25 ENCOUNTER — TELEPHONE (OUTPATIENT)
Dept: VASCULAR SURGERY | Facility: CLINIC | Age: 79
End: 2024-09-25
Payer: COMMERCIAL

## 2024-09-25 NOTE — PROGRESS NOTES
Patient called for a telephone visit. Certified Wound Care Nurse time spent evaluating patient record, completed a full evaluation and documented wound(s) & devon-wound skin; provided recommendation based on treatment plan. Reviewed discharge instructions, patient education, and discussed plan of care with appropriate medical team staff members and patient and/or family members.   Avis Oreilly RN     Patient has acute blood loss due to hemorrhage, the hemorrhage is due to gastrointestinal bleed, patient does have a propensity for bleeding due to a medication, the medication is Eliquis. Will trend hemoglobin/hematocrit Every 8 hours, as well as monitor and correct for any coagulation defects. CBC and vital signs have been reviewed and last CBC was noted-   Lab Results   Component Value Date    WBC 3.48 (L) 09/25/2024    HGB 11.1 (L) 09/25/2024    HCT 33.3 (L) 09/25/2024    MCV 96.0 09/25/2024     09/25/2024         Will order a type and screen and consent patient for blood transfusion. Will transfuse if Hgb is <7g/dl (<8g/dl in cases of active ACS) or if patient has rapid bleeding leading to hemodynamic instability.  GI consult, recommendations appreciated.

## 2024-09-25 NOTE — TELEPHONE ENCOUNTER
LM for Catherine that would be on the look out but Dr. Torres faxes and that he has had numerous orders for [place] signed and faxed back already. Wondering why so many thing are being sent and needing additional signing.

## 2024-09-25 NOTE — TELEPHONE ENCOUNTER
Caller: Catherine Gonzáles      Provider: MD Lars Torres    Detailed reason for call: ANGELINE Alexander will be faxing over a few old orders that need to be signed and faxed back as soon as possible (she states these were sent to the wrong fax in the past and apologizes as this was Nivia's error).   ORDER#  [id number]  [id number]  [id number]  [id number]    Best phone number to contact: No call back needed unless questions: [phone]  FAX: [phone]

## 2024-09-26 NOTE — TELEPHONE ENCOUNTER
Spoke with Catherine from [facility].  She has 4 orders that need to be signed.  She will refax them today and will label them 1 through 4.  Routing to Dr. Torres's team to keep an eye out for these. She apologized for all of the faxes, but did states these are the last 4 for now and then they will be all caught up.

## 2024-09-29 ENCOUNTER — HEALTH MAINTENANCE LETTER (OUTPATIENT)
Age: 79
End: 2024-09-29

## 2024-10-14 ENCOUNTER — OFFICE VISIT (OUTPATIENT)
Dept: INFECTIOUS DISEASES | Facility: CLINIC | Age: 79
End: 2024-10-14
Payer: COMMERCIAL

## 2024-10-14 ENCOUNTER — LAB (OUTPATIENT)
Dept: LAB | Facility: CLINIC | Age: 79
End: 2024-10-14
Payer: COMMERCIAL

## 2024-10-14 VITALS
WEIGHT: 244.6 LBS | HEART RATE: 85 BPM | TEMPERATURE: 97.9 F | BODY MASS INDEX: 46.22 KG/M2 | SYSTOLIC BLOOD PRESSURE: 123 MMHG | DIASTOLIC BLOOD PRESSURE: 77 MMHG | OXYGEN SATURATION: 98 %

## 2024-10-14 DIAGNOSIS — L03.115 CELLULITIS OF RIGHT LOWER EXTREMITY: ICD-10-CM

## 2024-10-14 DIAGNOSIS — L03.115 CELLULITIS OF RIGHT LOWER EXTREMITY: Primary | ICD-10-CM

## 2024-10-14 LAB
ALBUMIN SERPL BCG-MCNC: 3.2 G/DL (ref 3.5–5.2)
ALP SERPL-CCNC: 182 U/L (ref 40–150)
ALT SERPL W P-5'-P-CCNC: 19 U/L (ref 0–50)
ANION GAP SERPL CALCULATED.3IONS-SCNC: 14 MMOL/L (ref 7–15)
AST SERPL W P-5'-P-CCNC: 23 U/L (ref 0–45)
BASOPHILS # BLD AUTO: 0 10E3/UL (ref 0–0.2)
BASOPHILS NFR BLD AUTO: 0 %
BILIRUB SERPL-MCNC: 0.2 MG/DL
BUN SERPL-MCNC: 19.6 MG/DL (ref 8–23)
CALCIUM SERPL-MCNC: 9.1 MG/DL (ref 8.8–10.4)
CHLORIDE SERPL-SCNC: 102 MMOL/L (ref 98–107)
CREAT SERPL-MCNC: 1.07 MG/DL (ref 0.51–0.95)
CRP SERPL-MCNC: 130 MG/L
EGFRCR SERPLBLD CKD-EPI 2021: 53 ML/MIN/1.73M2
EOSINOPHIL # BLD AUTO: 0.2 10E3/UL (ref 0–0.7)
EOSINOPHIL NFR BLD AUTO: 2 %
ERYTHROCYTE [DISTWIDTH] IN BLOOD BY AUTOMATED COUNT: 14.6 % (ref 10–15)
ERYTHROCYTE [SEDIMENTATION RATE] IN BLOOD BY WESTERGREN METHOD: 140 MM/HR (ref 0–30)
GLUCOSE SERPL-MCNC: 73 MG/DL (ref 70–99)
HCO3 SERPL-SCNC: 20 MMOL/L (ref 22–29)
HCT VFR BLD AUTO: 35.4 % (ref 35–47)
HGB BLD-MCNC: 10.5 G/DL (ref 11.7–15.7)
IMM GRANULOCYTES # BLD: 0 10E3/UL
IMM GRANULOCYTES NFR BLD: 0 %
LYMPHOCYTES # BLD AUTO: 1.8 10E3/UL (ref 0.8–5.3)
LYMPHOCYTES NFR BLD AUTO: 20 %
MCH RBC QN AUTO: 25.8 PG (ref 26.5–33)
MCHC RBC AUTO-ENTMCNC: 29.7 G/DL (ref 31.5–36.5)
MCV RBC AUTO: 87 FL (ref 78–100)
MONOCYTES # BLD AUTO: 0.8 10E3/UL (ref 0–1.3)
MONOCYTES NFR BLD AUTO: 9 %
NEUTROPHILS # BLD AUTO: 6.1 10E3/UL (ref 1.6–8.3)
NEUTROPHILS NFR BLD AUTO: 68 %
PLATELET # BLD AUTO: 495 10E3/UL (ref 150–450)
POTASSIUM SERPL-SCNC: 4.2 MMOL/L (ref 3.4–5.3)
PROT SERPL-MCNC: 7.5 G/DL (ref 6.4–8.3)
RBC # BLD AUTO: 4.07 10E6/UL (ref 3.8–5.2)
SODIUM SERPL-SCNC: 136 MMOL/L (ref 135–145)
WBC # BLD AUTO: 9 10E3/UL (ref 4–11)

## 2024-10-14 PROCEDURE — 99214 OFFICE O/P EST MOD 30 MIN: CPT | Performed by: INTERNAL MEDICINE

## 2024-10-14 PROCEDURE — 85652 RBC SED RATE AUTOMATED: CPT

## 2024-10-14 PROCEDURE — G2211 COMPLEX E/M VISIT ADD ON: HCPCS | Performed by: INTERNAL MEDICINE

## 2024-10-14 PROCEDURE — 36415 COLL VENOUS BLD VENIPUNCTURE: CPT

## 2024-10-14 PROCEDURE — 80053 COMPREHEN METABOLIC PANEL: CPT

## 2024-10-14 PROCEDURE — 86140 C-REACTIVE PROTEIN: CPT

## 2024-10-14 PROCEDURE — 85025 COMPLETE CBC W/AUTO DIFF WBC: CPT

## 2024-10-14 RX ORDER — CIPROFLOXACIN 500 MG/1
500 TABLET, FILM COATED ORAL 2 TIMES DAILY
Qty: 14 TABLET | Refills: 0 | Status: SHIPPED | OUTPATIENT
Start: 2024-10-14 | End: 2024-10-21

## 2024-10-14 RX ORDER — SULFAMETHOXAZOLE AND TRIMETHOPRIM 800; 160 MG/1; MG/1
1 TABLET ORAL 2 TIMES DAILY
Qty: 20 TABLET | Refills: 0 | Status: SHIPPED | OUTPATIENT
Start: 2024-10-14 | End: 2024-10-24

## 2024-10-14 NOTE — PATIENT INSTRUCTIONS
Ramon Figueroa,  Thank you for taking the time to see us today. We will get labs today and repeat CBC, CMP, ESR, SED RATE  Empiric antibiotics- TMP-SMX (bactrim) and Ciprofloxacin- short course to prevent  antibiotic resistance     Consider following up at [facility] as your wound specialist moved to [place]  Gabriele Bullock M.D.  Wound Care Specialist    For gut protection-- and gut immunity- consider Rebekah Vargas MD  [facility]  Answering Service: [phone]  On-Call ID provider: [phone], option: 9

## 2024-10-14 NOTE — PATIENT INSTRUCTIONS
"Wound care supplies should be ordered by your home care agency. Please contact clinic if home care agency is not able to order supplies.         Wound Care Instructions        3 Times PER WEEK  and as needed, Cleanse your right leg wound(s) with 5 minute vashe soak.     Pat Dry with non-sterile gauze     Primary Dressing: Apply Apply a zinc oxide barrier cream to the entire leg. Cover with sorbact to the weeping and opened areas.      Secondary dressing: Cover with super absorbant or ABDs depending on drainage and ABD pads if needed. Do not cut super absorbant.     Secure with non-sterile roll gauze (4\" x 75\" roll) and tape (1\" roll tape) as needed; avoid adhesive directly on the skin     Compression: left leg edema wear and right leg 2 layer compression wrap.     Use your lymphedema pump.     SEEK MEDICAL CARE IF:  You have an increase in swelling, pain, or redness around the wound.  You have an increase in the amount of pus coming from the wound.  There is a bad smell coming from the wound.  The wound appears to be worsening/enlarging  You have a fever greater than 101.5 F        It is ok to continue current wound care treatment/products for the next 2-3 days until new wound care supplies are ordered and arrive. If longer than this please contact our office at [phone].     If for some reason you are not able to get your dressing(s) changed as outlined above (due to illness, lack of supplies, lack of help) please do the following: remove old, soiled dressings; wash the wounds with saline; pat dry; apply ABD pad or other absorbant pad and secure with rolled gauze; avoid tape directly on your skin; Call the clinic as soon as possible to let us know what the current issues are in receiving wound care [phone].     If you have a 2 layer or 4 layer compression wrap on these are safe to have on for ONLY 7 days. If for some reason you are not able to get the wrap(s) changed (due to illness; lack of supplies, lack " of help, lack of transportation) please do the following: unwrap the old 2 or 4 layer compression wrap; avoid using scissors as you could cut your skin and cause wounds; use tubular compression when available. Call to reschedule your home care or clinic visit appointment as soon as possible.        It is recommended that you elevate your legs throughout the day: approx 2-3 times each day  Elevate them above the level of your heart for 30 min.   Ways to do this:   Lay on the couch or your bed and prop your legs up on pillows   Recline back as far as you can go in your recliner and prop your legs on pillows.      Doing these things will help reduce the edema in your legs.        High Protein Foods  When you have an open ulcer, your bodies protein needs are much higher, so it is recommended eat good sources of protein or take a protein supplement!     Protein Supplements  -Premier Protein  -Ensure  -Boost  -Glucerna, if diabetic     Chicken  -Chicken breast, 3.5oz.-30 grams protein  -Chicken meat, cooked, 4 oz.     Beef  -Hamburger rfank, 4 oz-28 grams protein  -Steak, 6 oz-42 grams  -Most cuts of beef- 7 grams of protein per ounce     Fish  -Most fish fillets or steaks are about 22 grams of protein for 3 1/2 oz(100 grams) of cooked fish, or 6 grams per ounce  -Tuna, 6 oz can-40 grams of protein     Pork  -Pork chop, average-22 grams protein  -Pork loin or tenderloin, 4 oz.-29 grams  -Ham, 3oz serving- 19 grams  -Koo, 1 slice-3 grams     Eggs and Dairy  -Egg, large-7 grams  -Milk, 1 cup-8 grams  -Cottage cheese, 1/2 cup-15 grams  -Greek yogurt, 1 cup-usually 8-12 grams, check label     Beans  -Soy milk, 1 cup-6-10 grams  -Most beans(black, kenyon, lentils, etc.) about 7-10 grams protein per half cup of cooked beans     Nuts and Seeds  -Peanut butter, 2 Tablespoons- 8 grams protein  -Almonds, 1/4 cup- 8 grams  -Peanuts, 1/4 cup-9 grams  -Sunflower seeds, 1/4 cup- 6 grams        Please NOTE: if you are 15 minutes late  to your clinic appointment you will have to be rescheduled. Please call our clinic as soon as possible if you know you will not be able to get to your appointment at [phone].  If you fail to show up to 3 scheduled clinic appointments you will be dismissed from our clinic        We want to hear from you!  In the next few weeks, you should receive a call or email to complete a survey about your visit at [facility]. Please help us improve your appointment experience by letting us know how we did today. We strive to make your experience good and value any ways in which we could do better.       We value your input and suggestions.     Thank you for choosing the [facility]!

## 2024-10-14 NOTE — PROGRESS NOTES
[facility]  Infectious Disease Progress Note       10/14/2024      Assessment & Plan   right lower extremity recurrent cellulitis- Pseudomonas, MSSA, Proteus, Stenotrophomonas  See photos below of lower extremity wound: Skin seems to be peeling off at the time of dressing change.  Patient stated she would like to continue to follow-up with Dr. Blulock if possible at [facility]    Sepsis-during hospitalization, resolved  Prior cellulitis episode was in July   risk factors obesity, lymphedema with open wounds,  Venous insufficiency  Leukocytosis improved 21K to 14 K to 7 K  Fever resolved quickly       Susceptibility data from last 90 days.  Collected Specimen Info Organism Ampicillin Ampicillin/Sulbactam Cefepime Ceftazidime Ceftriaxone Ciprofloxacin Clindamycin Daptomycin Doxycycline Erythromycin Gentamicin Levofloxacin Meropenem Minocycline Oxacillin   09/05/24 Wound from Leg, Right Pseudomonas aeruginosa    S  S   S       S  S       Stenotrophomonas maltophilia             I   S      Proteus mirabilis  S  S  S  S  S  S      S  S  S       Staphylococcus aureus       R  S  R  R  S     S     Candida parapsilosis complex                  07/16/24 Urine, NOS Urogenital trang                    Collected Specimen Info Organism Piperacillin/Tazobactam Tetracycline Tobramycin Trimethoprim/Sulfamethoxazole  Vancomycin   09/05/24 Wound from Leg, Right Pseudomonas aeruginosa  S   S       Stenotrophomonas maltophilia     S      Proteus mirabilis  S   S  S      Staphylococcus aureus   R   R  S     Candida parapsilosis complex        07/16/24 Urine, NOS Urogenital trang               PLAN    Patient will call [facility] to see if she can follow-up with Dr. Bullock if possible  In the interim follow-up with wound care as scheduled  Empiric Bactrim and ciprofloxacin, short course  Previously completed antibiotics:  Discontinued vancomycin  Meropenem x 7 days via CADD on discharge plus PO Minocycline x 10  days  PICC has been removed  Leg elevation  Wound care  Patient has done IV abx at home in the past including multiple times  Please see previous ID notes by Blayne Parham M.D.    Patient Instructions   Ramon Figueroa,  Thank you for taking the time to see us today. We will get labs today and repeat CBC, CMP, ESR, SED RATE  Empiric antibiotics- TMP-SMX (bactrim) and Ciprofloxacin- short course to prevent  antibiotic resistance     Consider following up at [facility] as your wound specialist moved to [place]  Gabriele Bullock M.D.  Wound Care Specialist    For gut protection-- and gut immunity- consider Kefir    Kathe Vargas MD  [facility]  Answering Service: [phone]  On-Call ID provider: [phone], option: 9          Kathe Vargas MD  [facility]  Answering Service: [phone]  On-Call ID provider: [phone], option: 9    Photos from patient's phone 10/14/2024          ______________________________________________________________________    Interval History   Has tolerated IV antibiotics completed course  Patient stated that the leg is red, intermittent oozing and bleeding, see photos above  Patient would like to continue care with Dr. Hernandez who has relocated to [facility].  Patient will find out that is a possibility  Previous note: IV infiltrated- PICC ordered- doing well. Patient has done IV abx at home in 2021 and has help  Dressing     Previous note    Dressing change with nurse-- painful  Healing    Previous note: She was worried rash she had chest tightness with high blood pressure, addressed by hospitalist      Past medical, family, and social history reviewed, unchanged from before.  All 12 systems reviewed, negative except for the above.      Physical Exam   Vital Signs: Temp: 97.9  F (36.6  C) Temp src: Oral BP: 123/77 Pulse: 85     SpO2: 98 %      Weight: 244 lbs 9.6 oz  \Gen. appearance nontoxic  Eyes no conjunctivitis or  icterus  Neck no stiffness   Heartedema  Lungs no sob  Abdomen soft not tender  Extremities no synovitis,RLE massive lymphedema  And erythema mostly below knee , better  Above knee erythema faded>resolved  Superificila wounds, lots of weeping  Skin  erythema, superficial ulceration  Neurologic alert oriented no focal deficits      Previous notes             Data   No results found for this or any previous visit (from the past 24 hour(s)).      Total Time Spent 35 minutes with >50% of the time spent in chart review, evaluation, management, counseling, education and care coordination.

## 2024-10-21 ENCOUNTER — OFFICE VISIT (OUTPATIENT)
Dept: VASCULAR SURGERY | Facility: CLINIC | Age: 79
End: 2024-10-21
Attending: FAMILY MEDICINE
Payer: COMMERCIAL

## 2024-10-21 VITALS
RESPIRATION RATE: 16 BRPM | OXYGEN SATURATION: 98 % | SYSTOLIC BLOOD PRESSURE: 139 MMHG | HEART RATE: 84 BPM | TEMPERATURE: 98.1 F | DIASTOLIC BLOOD PRESSURE: 76 MMHG

## 2024-10-21 DIAGNOSIS — L97.912 ULCER OF RIGHT LOWER EXTREMITY WITH FAT LAYER EXPOSED (H): ICD-10-CM

## 2024-10-21 DIAGNOSIS — R60.0 LOCALIZED EDEMA: Primary | ICD-10-CM

## 2024-10-21 PROCEDURE — G0463 HOSPITAL OUTPT CLINIC VISIT: HCPCS | Performed by: FAMILY MEDICINE

## 2024-10-21 PROCEDURE — 99214 OFFICE O/P EST MOD 30 MIN: CPT | Performed by: FAMILY MEDICINE

## 2024-10-21 RX ORDER — LIDOCAINE 50 MG/G
OINTMENT TOPICAL DAILY PRN
Status: ACTIVE | OUTPATIENT
Start: 2024-10-21

## 2024-10-21 ASSESSMENT — PAIN SCALES - GENERAL: PAINLEVEL: MODERATE PAIN (4)

## 2024-10-21 NOTE — PROGRESS NOTES
Wound Clinic Note         Visit date: 10/21/2024       Cheif Complaint:     Elizabeth Kelley is a 79 year old   female had concerns including Wound Check.  The patient has lower extremity edema and right leg ulcers         HISTORY OF PRESENT ILLNESS:    Elizabeth Kelley reports the right leg wound has been present since 2020.  The wound began due to the area being bumped.     The patient denies a history of congestive heart failure, previous vein procedures or previous DVT.     Since her last clinic visit with me the home health nurses have continued to change the bandages 3 times a week.  The patient reports the Hydrofera Blue has been sticking to the wound beds and they have only been able to use the Hydrofera Blue on some of the posterior leg wounds not most of the anterior leg wounds.  They have then been covering this with superabsorbent pads and the multilayer compression wrap.  There is been heavy serous drainage from the wounds.    She is also continue to work with the infectious disease specialist who she last saw on October 14, 2024.  The patient continues on oral antibiotics with no symptoms of an adverse reaction.          The pateint denies fevers or chills.  They report the pain from the wound has been 0/10 and has remained about the same recently.      The patient reports propping up their legs occasionally during the day, but they do not elevate their legs above the level of their heart.  She reports for the most part she was elevating her legs but more recently she has gotten out of the habit of elevating her legs above the level of her heart.    She has not gotten back into the habit of using the pneumatic lymphedema pump yet.    She confirms she takes Lasix once a day to help control her swelling.    Today the patient reports maintaining a high protein diet, but has not been taking protein supplements lately.        The patient denies a history of diabetes, smoking or chronic steroid use.          The patient has not had any symptoms of infection relating to the wound recently and is not currently on antibiotics.       Problem List:   Past Medical History:   Diagnosis Date    Ankle contracture, left     Class 3 severe obesity due to excess calories without serious comorbidity with body mass index (BMI) of 45.0 to 49.9 in adult (H)     Combined gastric and duodenal ulcer     Controlled substance agreement broken     Depression     Dyslipidemia     Edema     Edema     Gait abnormality     GERD (gastroesophageal reflux disease)     Gout     Lymphedema of both lower extremities     Melanoma (H)     left upper arm    MS (multiple sclerosis) (H)     RLS (restless legs syndrome)     Skin ulcer of left lower leg (H)     Valgus deformity of both feet     Vitamin D deficiency              Family Hx: family history includes Arthritis in her maternal grandmother, maternal uncle, paternal grandfather, paternal grandmother, and sister; Asthma in her sister; Brain Cancer in her father; Breast Cancer in her paternal aunt; Colon Cancer in her paternal aunt; Early Death in her maternal grandfather; Hyperlipidemia in her mother, paternal aunt, and sister; Hypertension in her mother, paternal aunt, and sister; Multiple Sclerosis in her mother and sister; Obesity in her sister; Uterine Cancer in her mother.       Surgical Hx:   Past Surgical History:   Procedure Laterality Date    ABLATION SAPHENOUS VEIN W/ RFA Right 04/12/2021    Saphenous vein, anterior accessory saphenous vein, sclerotherapy of multiple veins.    COSMETIC SURGERY  1988    reduction of excess skin from weight loss    ESOPHAGOSCOPY, GASTROSCOPY, DUODENOSCOPY (EGD), COMBINED N/A 03/30/2015    Procedure: UPPER ENDOSCOPY with gastric biopsy;  Surgeon: Checo Fletcher MD;  Location: [facility];  Service:     IRRIGATION AND DEBRIDEMENT LOWER EXTREMITY, COMBINED Right 3/21/2022    Procedure: RIGHT LEG WOUND DEBRIDEMENT, PAULIE DERMATONE, MISONIX, VAC VERA  FLOW WITH VASHE;  Surgeon: Gabriele Bullock MD;  Location: [facility] OR    IRRIGATION AND DEBRIDEMENT LOWER EXTREMITY, COMBINED Right 3/28/2022    Procedure: DEBRIDEMENT AND WOUND DRESSING CHANGE AND MYRIAD APPLICATION OF RIGHT LOWER LEG WOUND;  Surgeon: Gabriele Bullock MD;  Location: [facility] OR    MAMMOPLASTY REDUCTION Bilateral     MOHS MICROGRAPHIC PROCEDURE      left upper arm, melanoma    PICC DOUBLE LUMEN PLACEMENT  7/21/2024    PICC SINGLE LUMEN PLACEMENT  8/13/2021         PICC SINGLE LUMEN PLACEMENT  9/2/2021         PICC SINGLE LUMEN PLACEMENT  9/8/2024    SPINE SURGERY      L5 area surgery, decompression          Allergies:    Allergies   Allergen Reactions    Other Environmental Allergy Anaphylaxis     Bees/Wasps Stings  Bees/Wasps Stings    Adhesive Tape Other (See Comments)     Red,itchy and oozes  Red,itchy and oozes    Adhesive [Cyanoacrylate] Unknown     Other reaction(s): sores    Latex Hives    Ragweeds Itching    Oxycodone-Acetaminophen Rash    Vicodin [Hydrocodone-Acetaminophen] Nausea and Vomiting and Hives              Medication History:    Current Outpatient Medications   Medication Sig Dispense Refill    acetaminophen (TYLENOL) 500 MG tablet Take 500 mg by mouth 2 times daily. 500 mg in the morning and 500 mg at noon      ascorbic acid (VITAMIN C) 250 MG CHEW chewable tablet Take 250 mg by mouth daily.      aspirin (ASPIRIN 81) 81 MG chewable tablet Take 1 tablet by mouth every morning      Bioflavonoid Products (CINDY-C/BIOFLAVONOIDS PO) Take 1 tablet by mouth every evening.      buPROPion (WELLBUTRIN SR) 150 MG 12 hr tablet Take 150 mg by mouth every morning       Carboxymethylcellulose Sodium 0.25 % SOLN Apply 1 drop to eye daily as needed      citalopram (CELEXA) 40 MG tablet Take 40 mg by mouth daily (with lunch)      ferrous sulfate (FEROSUL) 325 (65 Fe) MG tablet Take 325 mg by mouth three times a week      furosemide (LASIX) 20 MG tablet Take 20 mg by mouth 2 times daily      gabapentin  (NEURONTIN) 100 MG capsule Take 100 mg by mouth 3 times daily      magnesium oxide (MAG-OX) 400 MG tablet Take 1 tablet by mouth every morning      multivitamin w/minerals (CENTRUM ADULTS) tablet Take 1 tablet by mouth every morning      nitroGLYcerin (NITROSTAT) 0.4 MG sublingual tablet PLACE 1 TABLET UNDER TONGUE EVERY 5 MINTUES AS NEEDED FOR CHEST PAIN FOR UP TO 30 DAYS      pantoprazole (PROTONIX) 40 MG EC tablet Take 40 mg by mouth every morning      potassium chloride ER (K-TAB) 20 MEQ CR tablet Take 20 mEq by mouth every evening.      rOPINIRole (REQUIP) 1 MG tablet Take 0.5 mg by mouth every morning In addition, patient takes one tablet (1 mg) in the evening      rOPINIRole (REQUIP) 1 MG tablet Take 1 mg by mouth every evening In addition, patient takes one half tablet (0.5 mg) in the morning      simvastatin (ZOCOR) 40 MG tablet [SIMVASTATIN (ZOCOR) 40 MG TABLET] Take 40 mg by mouth bedtime.      spironolactone (ALDACTONE) 25 MG tablet Take 25 mg by mouth every morning      sulfamethoxazole-trimethoprim (BACTRIM DS) 800-160 MG tablet Take 1 tablet by mouth 2 times daily for 10 days. 20 tablet 0    vitamin B-Complex Take 1 tablet by mouth daily 60 tablet 3    vitamin D3 (CHOLECALCIFEROL) 250 mcg (42151 units) capsule Take 1 capsule (250 mcg) by mouth daily 60 capsule 3    Wound Cleansers (VASHE WOUND THERAPY EX) Externally apply topically daily as needed      zinc 50 MG TABS Take 1 tablet by mouth every morning      acetaminophen (TYLENOL) 500 MG tablet Take 1,000 mg by mouth every evening. (Patient not taking: Reported on 10/14/2024)      ciprofloxacin (CIPRO) 500 MG tablet Take 1 tablet (500 mg) by mouth 2 times daily for 7 days. (Patient not taking: Reported on 10/21/2024) 14 tablet 0    rOPINIRole (REQUIP) 1 MG tablet Take 0.5-1 mg by mouth daily as needed. At noon (Patient not taking: Reported on 10/14/2024)       Current Facility-Administered Medications   Medication Dose Route Frequency Provider Last  Rate Last Admin    oxyCODONE (ROXICODONE) tablet 5 mg  5 mg Oral Q6H PRN Omero Shukla MD             Tobacco History:  reports that she quit smoking about 48 years ago. Her smoking use included cigarettes. She started smoking about 60 years ago. She has a 3 pack-year smoking history. She has never used smokeless tobacco.       REVIEW OF SYMPTOMS:   The review of systems was negative except as noted in the HPI.           PHYSICAL EXAMINATION:     /76   Pulse 84   Temp 98.1  F (36.7  C)   Resp 16   SpO2 98%            GENERAL: The patient overall appears well and is no acute distress.   HEAD: normocephalic   EYES: Sclera and conjunctiva clear   NECK: no obvious masses   LUNGS: breathing is unlabored.   EXTREMITIES: No clubbing, cyanosis or edema   SKIN: No rashes or other abnormalities except as noted under the Wound section below.   NEUROLOGICAL: normal motor and sensory function   EDEMA: Sever  and Lymphedema       WOUND: The wound appears healthy with no sign of infection.   Wound bed: granulation tissue   Periwound: healthy intact skin  Compared with her last clinic visit with me the right leg wounds are much worse.  However compared with the pictures in the electronic medical record from October 14 there is some improvement.    Also see below for wound details:       Circumferential volume measures:            7/5/2024     9:00 AM 7/11/2024    11:00 AM 7/15/2024    11:00 AM 7/25/2024    11:00 AM 7/29/2024     1:00 PM   Circumferential Measures   Right just above MTP 26 23.5 26 24 22.7   Right Ankle 26 22.5 26 25 22.5   Right Widest Calf 63 58 58.5 58 55.5   Left - just above MTP 23 26 22 23 22.1   Left Ankle 24 23.2 24 23.5 23.3   Left Widest Calf 61 58 46 52.3 45.7       Ulceration(s)/Wound(s):   Please see the media tab under the chart review for pictures of the wounds.  Nursing staff removed dressings and cleansed wound.    VASC Wound right lower leg (Active)   Pre Size Length 30 10/21/24 1300    Pre Size Width 61 10/21/24 1300   Pre Size Depth 0.3 10/21/24 1300   Description circumfrential 10/21/24 1300                                         Recent Labs   Lab Test 04/04/22  0523 06/23/16  1130   A1C 5.4 5.9          Recent Labs   Lab Test 02/10/23  1535 05/25/22  1637 04/04/22  0523   ALBUMIN 4.3 4.1 1.9*      October 27, 2021 ankle-brachial indices: Normal  October 27, 2021 right lower extremity venous insufficiency ultrasound: Normal      No sharp debridement performed today.         ASSESSMENT:   This is a 79 year old  female with lower extremity edema and bilateral leg ulcers          PLAN:   We will send orders to the home health nurses asking them to bandage the right leg with zinc oxide barrier cream applied to the periwound followed by Sorbact, superabsorbent pads and a multilayer compression wrap change 3 times a week by the home health nurses.     She previously had a bilateral lower extremity venous insufficiency ultrasound performed on August 3, 2023 which did show areas of superficial venous insufficiency.  She then later met with Dr. Floyd on November 13, 2023 to discuss treatment options for her venous insufficiency but he did not recommend any procedures at that time.    Separate from the discussion of her wound care we then discussed the treatment of her lower extremity edema.  I have again explained to the patient today that controlling the edema is probably the most important thing we can do to help heal the wound.  I have specifically recommended that they lay down with their legs above the level of the heart for 30 minutes at least twice a day.  I emphasized that if we can not control the edema we will likely not be able to get this wound to heal.    I have again encouraged her to use the pneumatic lymphedema pump once a day.  I have encouraged the patient to continue on their high protein diet to aid in wound healing.   The patient will return to the wound clinic to see me in 3-4  weeks.        30 minutes spent on the date of the encounter doing chart review, history and exam, documentation and further activities per the note      Lars Torres MD  10/21/2024   1:40 PM   [facility]  [phone]    This note was electronically signed by Lars Torres MD

## 2024-10-21 NOTE — PROGRESS NOTES
Compression Applied to the Left Leg:  edema wear      Compression Applied to the Right Le-Layer Coban    2-Layer Coban:Applied the inner foam layer with the foot dorsiflexed and starting atthe base of the fifth metatarsal head. Leaving the bottom of the heel exposed, proceed by winding the foam up the leg using minimaloverlap to just below the fibular head. Apply the compression layer with the foot dorsiflexed and startingat the base of the fifth metatarsal head. Apply at full stretch and proceed up the leg using 50% overlap. The bottom of the heel is covered with the compression layer. The end at the fibular head or just below the back of the knee and levelwith the top edge of the foam layer.  Gently pressed and conformed the entire surface of the system to ensurethat the two layers are firmly bound together

## 2024-10-28 ENCOUNTER — TELEPHONE (OUTPATIENT)
Dept: INFECTIOUS DISEASES | Facility: CLINIC | Age: 79
End: 2024-10-28
Payer: COMMERCIAL

## 2024-10-28 DIAGNOSIS — L03.115 CELLULITIS OF RIGHT LOWER EXTREMITY: Primary | ICD-10-CM

## 2024-10-28 NOTE — TELEPHONE ENCOUNTER
Spoke to pt-pt only took the bactrim did not take the cipro- as pt thought they were not supposed to be taken together and she was only supposed to take the bactrim. Pt's husbands threw the cipro away. Pts leg is weeping, and hurting pt thought it was due to the bandage however they loosened the bandage and it was still painful- pt states it is flaring up. Pt's nurse is going to come out today,pt will ask nurse to help her upload picture to [facility] and send to clinic if possible.      LOV 10/14/24  Patient will call [facility] to see if she can follow-up with Dr. Bullock if possible  In the interim follow-up with wound care as scheduled  Empiric Bactrim and ciprofloxacin, short course  Previously completed antibiotics:  Discontinued vancomycin  Meropenem x 7 days via CADD on discharge plus PO Minocycline x 10 days  PICC has been removed

## 2024-10-28 NOTE — TELEPHONE ENCOUNTER
[facility] Call Center    Phone Message    May a detailed message be left on voicemail: yes     Reason for Call: Other: Per patient, at appt on 10/14 with Dr. Vargas, they had gone over a couple medications. Patient completed one but thinks the other might have gotten thrown out on accident and is looking for some medication clarification. Patient experiencing symptoms again and would like a call back at [phone].      Action Taken: Message routed to:  Other: ID    Travel Screening: Not Applicable     Date of Service: 10/28/2024

## 2024-10-30 ENCOUNTER — TELEPHONE (OUTPATIENT)
Dept: VASCULAR SURGERY | Facility: CLINIC | Age: 79
End: 2024-10-30
Payer: COMMERCIAL

## 2024-10-30 NOTE — TELEPHONE ENCOUNTER
Caller: Nivia    Provider: MD Lars Torres    Detailed reason for call: Nivia will be faxing 2 orders that need to be signed as soon as possible. She understands there have been numerous orders but there are 2 that are over 30 days and have slipped through the cracks.    She is aware Dr. Torres will be in [place] tomorrow and asked if they could be faxed back tomorrow.    Best phone number to contact: [phone]

## 2024-11-01 ENCOUNTER — TELEPHONE (OUTPATIENT)
Dept: VASCULAR SURGERY | Facility: CLINIC | Age: 79
End: 2024-11-01
Payer: COMMERCIAL

## 2024-11-01 NOTE — TELEPHONE ENCOUNTER
"HC nurse Talita from [facility] calling with concern of infection due to patients right leg wound. She stated that it is bright red and has a heavy amount amount of \"yellow/tan drainage\". Informed her if any concern of infection pt needs to go to ED or urgent care. She will update the pt.  "

## 2024-11-11 NOTE — TELEPHONE ENCOUNTER
[facility] is calling back on orders. They are missing orders still.     Orders numbers:   - [id number] (From October)  - [id number] (From November)  - [id number] (From September)    Please sign, date, and return orders for processing. [facility] has re-faxed orders to be signed this morning.     No call back is needed unless there is an issue, please call [phone], directly to Catherine.

## 2024-11-13 RX ORDER — CIPROFLOXACIN 500 MG/1
500 TABLET, FILM COATED ORAL 2 TIMES DAILY
Qty: 20 TABLET | Refills: 0 | Status: SHIPPED | OUTPATIENT
Start: 2024-11-13 | End: 2024-11-23

## 2024-11-14 ENCOUNTER — HOSPITAL ENCOUNTER (OUTPATIENT)
Dept: WOUND CARE | Facility: CLINIC | Age: 79
Discharge: HOME | End: 2024-11-14
Attending: FAMILY MEDICINE
Payer: COMMERCIAL

## 2024-11-14 ENCOUNTER — TELEPHONE (OUTPATIENT)
Dept: INFECTIOUS DISEASES | Facility: CLINIC | Age: 79
End: 2024-11-14

## 2024-11-14 VITALS — HEART RATE: 83 BPM | TEMPERATURE: 99.1 F | SYSTOLIC BLOOD PRESSURE: 151 MMHG | DIASTOLIC BLOOD PRESSURE: 79 MMHG

## 2024-11-14 DIAGNOSIS — R60.0 LOCALIZED EDEMA: Primary | ICD-10-CM

## 2024-11-14 DIAGNOSIS — L97.912 ULCER OF RIGHT LOWER EXTREMITY WITH FAT LAYER EXPOSED (H): ICD-10-CM

## 2024-11-14 NOTE — PROGRESS NOTES
Wound Clinic Note         Visit date: 11/14/2024       Cheif Complaint:     Elizabeth Kelley is a 79 year old   female had concerns including WOUND CARE.  The patient has lower extremity edema and right leg ulcers         HISTORY OF PRESENT ILLNESS:    Elizabeth Kelley reports the right leg wound has been present since 2020.  The wound began due to the area being bumped.     The patient denies a history of congestive heart failure, previous vein procedures or previous DVT.     Since her last clinic visit with me the home health nurses have continued to change the bandages 3 times a week with zinc oxide barrier cream applied to the periwound followed by Urgotul, superabsorbent pads and the multilayer compression wrap.  There is been heavy serous drainage from the wounds.    She completed taking the oral antibiotics under the direction of the infectious disease specialist.  She reports she does not have any further follow-up appointments with the infectious disease specialist scheduled.        The pateint denies fevers or chills.  They report the pain from the wound has been 0/10 and has remained about the same recently.      The patient reports propping up their legs occasionally during the day, but they do not elevate their legs above the level of their heart.  She elevates her legs higher than her heart at night while she is sleeping but has not been doing that during the day lately.    She has used her pneumatic lymphedema pump on her left leg but she does not use it on the right leg because it is painful.    She confirms she takes Lasix once a day to help control her swelling.    Today the patient reports maintaining a high protein diet, but has not been taking protein supplements lately.        The patient denies a history of diabetes, smoking or chronic steroid use.         The patient has not had any symptoms of infection relating to the wound recently and is not currently on antibiotics.       Problem  List:   Past Medical History:   Diagnosis Date    Ankle contracture, left     Class 3 severe obesity due to excess calories without serious comorbidity with body mass index (BMI) of 45.0 to 49.9 in adult (H)     Combined gastric and duodenal ulcer     Controlled substance agreement broken     Depression     Dyslipidemia     Edema     Edema     Gait abnormality     GERD (gastroesophageal reflux disease)     Gout     Lymphedema of both lower extremities     Melanoma (H)     left upper arm    MS (multiple sclerosis) (H)     RLS (restless legs syndrome)     Skin ulcer of left lower leg (H)     Valgus deformity of both feet     Vitamin D deficiency              Family Hx: family history includes Arthritis in her maternal grandmother, maternal uncle, paternal grandfather, paternal grandmother, and sister; Asthma in her sister; Brain Cancer in her father; Breast Cancer in her paternal aunt; Colon Cancer in her paternal aunt; Early Death in her maternal grandfather; Hyperlipidemia in her mother, paternal aunt, and sister; Hypertension in her mother, paternal aunt, and sister; Multiple Sclerosis in her mother and sister; Obesity in her sister; Uterine Cancer in her mother.       Surgical Hx:   Past Surgical History:   Procedure Laterality Date    ABLATION SAPHENOUS VEIN W/ RFA Right 04/12/2021    Saphenous vein, anterior accessory saphenous vein, sclerotherapy of multiple veins.    COSMETIC SURGERY  1988    reduction of excess skin from weight loss    ESOPHAGOSCOPY, GASTROSCOPY, DUODENOSCOPY (EGD), COMBINED N/A 03/30/2015    Procedure: UPPER ENDOSCOPY with gastric biopsy;  Surgeon: Checo Fletcher MD;  Location: [facility];  Service:     IRRIGATION AND DEBRIDEMENT LOWER EXTREMITY, COMBINED Right 3/21/2022    Procedure: RIGHT LEG WOUND DEBRIDEMENT, PAULIE DERMATONE, MISONIX, VAC VERA FLOW WITH VASHE;  Surgeon: Gabriele Bullock MD;  Location:  OR    IRRIGATION AND DEBRIDEMENT LOWER EXTREMITY, COMBINED Right 3/28/2022     Procedure: DEBRIDEMENT AND WOUND DRESSING CHANGE AND MYRIAD APPLICATION OF RIGHT LOWER LEG WOUND;  Surgeon: Gabriele Bullock MD;  Location: [facility] OR    MAMMOPLASTY REDUCTION Bilateral     MOHS MICROGRAPHIC PROCEDURE      left upper arm, melanoma    PICC DOUBLE LUMEN PLACEMENT  7/21/2024    PICC SINGLE LUMEN PLACEMENT  8/13/2021         PICC SINGLE LUMEN PLACEMENT  9/2/2021         PICC SINGLE LUMEN PLACEMENT  9/8/2024    SPINE SURGERY      L5 area surgery, decompression          Allergies:    Allergies   Allergen Reactions    Other Environmental Allergy Anaphylaxis     Bees/Wasps Stings  Bees/Wasps Stings    Adhesive Tape Other (See Comments)     Red,itchy and oozes  Red,itchy and oozes    Adhesive [Cyanoacrylate] Unknown     Other reaction(s): sores    Latex Hives    Ragweeds Itching    Oxycodone-Acetaminophen Rash    Vicodin [Hydrocodone-Acetaminophen] Nausea and Vomiting and Hives              Medication History:    Current Outpatient Medications   Medication Sig Dispense Refill    acetaminophen (TYLENOL) 500 MG tablet Take 500 mg by mouth 2 times daily. 500 mg in the morning and 500 mg at noon      acetaminophen (TYLENOL) 500 MG tablet Take 1,000 mg by mouth every evening. (Patient not taking: Reported on 10/14/2024)      ascorbic acid (VITAMIN C) 250 MG CHEW chewable tablet Take 250 mg by mouth daily.      aspirin (ASPIRIN 81) 81 MG chewable tablet Take 1 tablet by mouth every morning      Bioflavonoid Products (CINDY-C/BIOFLAVONOIDS PO) Take 1 tablet by mouth every evening.      buPROPion (WELLBUTRIN SR) 150 MG 12 hr tablet Take 150 mg by mouth every morning       Carboxymethylcellulose Sodium 0.25 % SOLN Apply 1 drop to eye daily as needed      ciprofloxacin (CIPRO) 500 MG tablet Take 1 tablet (500 mg) by mouth 2 times daily for 10 days. 20 tablet 0    citalopram (CELEXA) 40 MG tablet Take 40 mg by mouth daily (with lunch)      ferrous sulfate (FEROSUL) 325 (65 Fe) MG tablet Take 325 mg by mouth three times  a week      furosemide (LASIX) 20 MG tablet Take 20 mg by mouth 2 times daily      gabapentin (NEURONTIN) 100 MG capsule Take 100 mg by mouth 3 times daily      magnesium oxide (MAG-OX) 400 MG tablet Take 1 tablet by mouth every morning      multivitamin w/minerals (CENTRUM ADULTS) tablet Take 1 tablet by mouth every morning      nitroGLYcerin (NITROSTAT) 0.4 MG sublingual tablet PLACE 1 TABLET UNDER TONGUE EVERY 5 MINTUES AS NEEDED FOR CHEST PAIN FOR UP TO 30 DAYS      pantoprazole (PROTONIX) 40 MG EC tablet Take 40 mg by mouth every morning      potassium chloride ER (K-TAB) 20 MEQ CR tablet Take 20 mEq by mouth every evening.      rOPINIRole (REQUIP) 1 MG tablet Take 0.5-1 mg by mouth daily as needed. At noon (Patient not taking: Reported on 10/14/2024)      rOPINIRole (REQUIP) 1 MG tablet Take 0.5 mg by mouth every morning In addition, patient takes one tablet (1 mg) in the evening      rOPINIRole (REQUIP) 1 MG tablet Take 1 mg by mouth every evening In addition, patient takes one half tablet (0.5 mg) in the morning      simvastatin (ZOCOR) 40 MG tablet [SIMVASTATIN (ZOCOR) 40 MG TABLET] Take 40 mg by mouth bedtime.      spironolactone (ALDACTONE) 25 MG tablet Take 25 mg by mouth every morning      vitamin B-Complex Take 1 tablet by mouth daily 60 tablet 3    vitamin D3 (CHOLECALCIFEROL) 250 mcg (13150 units) capsule Take 1 capsule (250 mcg) by mouth daily 60 capsule 3    Wound Cleansers (VASHE WOUND THERAPY EX) Externally apply topically daily as needed      zinc 50 MG TABS Take 1 tablet by mouth every morning       Current Facility-Administered Medications   Medication Dose Route Frequency Provider Last Rate Last Admin    lidocaine (XYLOCAINE) 5 % ointment   Topical Daily PRN         oxyCODONE (ROXICODONE) tablet 5 mg  5 mg Oral Q6H PRN Omero Shukla MD             Tobacco History:  reports that she quit smoking about 48 years ago. Her smoking use included cigarettes. She started smoking about 60 years  ago. She has a 3 pack-year smoking history. She has never used smokeless tobacco.       REVIEW OF SYMPTOMS:   The review of systems was negative except as noted in the HPI.           PHYSICAL EXAMINATION:     BP (!) 151/79 (BP Location: Left arm, Patient Position: Sitting, Cuff Size: Adult Large)   Pulse 83   Temp 99.1  F (37.3  C) (Temporal)            GENERAL: The patient overall appears well and is no acute distress.   HEAD: normocephalic   EYES: Sclera and conjunctiva clear   NECK: no obvious masses   LUNGS: breathing is unlabored.   EXTREMITIES: No clubbing, cyanosis or edema   SKIN: No rashes or other abnormalities except as noted under the Wound section below.   NEUROLOGICAL: normal motor and sensory function   EDEMA: Sever  and Lymphedema       WOUND: The wound appears healthy with no sign of infection.   Wound bed: granulation tissue   Periwound: healthy intact skin  The right leg is much worse today compared with her last clinic visit there is more superficial open areas.  The skin is very irritated from the soaking wet bandages sitting on the skin.  As noted above however the skin is not warm to the touch and does not appear cellulitic.    Also see below for wound details:       Circumferential volume measures:            7/5/2024     9:00 AM 7/11/2024    11:00 AM 7/15/2024    11:00 AM 7/25/2024    11:00 AM 7/29/2024     1:00 PM   Circumferential Measures   Right just above MTP 26 23.5 26 24 22.7   Right Ankle 26 22.5 26 25 22.5   Right Widest Calf 63 58 58.5 58 55.5   Left - just above MTP 23 26 22 23 22.1   Left Ankle 24 23.2 24 23.5 23.3   Left Widest Calf 61 58 46 52.3 45.7       Ulceration(s)/Wound(s):   Please see the media tab under the chart review for pictures of the wounds.  Nursing staff removed dressings and cleansed wound.    Wound (used by OP [facility] only) 11/14/24 1150 leg Right lower;anterior;posterior;lateral;medial other (see comments) (Active)   Thickness/Stage full thickness 11/14/24  1100   Base slough;white;granulating;pink 11/14/24 1100   Periwound edematous;redness;macerated;swelling 11/14/24 1100   Periwound Temperature warm 11/14/24 1100   Periwound Skin Turgor soft 11/14/24 1100   Length (cm) 29 11/14/24 1100   Width (cm) 59 11/14/24 1100   Depth (cm) 0.2 11/14/24 1100   Wound (cm^2) 1711 cm^2 11/14/24 1100   Wound Volume (cm^3) 342.2 cm^3 11/14/24 1100   Drainage Characteristics/Odor green;malodorous;yellow 11/14/24 1100   Drainage Amount copious 11/14/24 1100   Care, Wound non-select wound debridement performed. 11/14/24 1100                                           Recent Labs   Lab Test 04/04/22  0523 06/23/16  1130   A1C 5.4 5.9          Recent Labs   Lab Test 02/10/23  1535 05/25/22  1637 04/04/22  0523   ALBUMIN 4.3 4.1 1.9*      October 27, 2021 ankle-brachial indices: Normal  October 27, 2021 right lower extremity venous insufficiency ultrasound: Normal      No sharp debridement performed today.         ASSESSMENT:   This is a 79 year old  female with lower extremity edema and bilateral leg ulcers          PLAN:   She will continue to have the bandages changed 3 times a week by home health nurses beginning with a zinc oxide barrier cream applied to the periwound followed by Sorbact, superabsorbent pads and a multilayer compression wrap.  However now on the alternating days she will change the bandages herself so that the bandages are always changed once a day.  She will apply Xeroform/Urgotul to the wound beds followed by superabsorbent pads and her Velcro compression garments.    I have explained to the patient today that I think we absolutely have to get the swelling under better control in order to control the drainage here.  In addition to doing daily dressing changes I have also encouraged her to make more of an effort to elevate her legs and to try using the pneumatic lymphedema pump on her right leg.    She previously had a bilateral lower extremity venous insufficiency  ultrasound performed on August 3, 2023 which did show areas of superficial venous insufficiency.  She then later met with Dr. Floyd on November 13, 2023 to discuss treatment options for her venous insufficiency but he did not recommend any procedures at that time.    Separate from the discussion of her wound care we then discussed the treatment of her lower extremity edema.  I have again explained to the patient today that controlling the edema is probably the most important thing we can do to help heal the wound.  I have specifically recommended that they lay down with their legs above the level of the heart for 30 minutes at least twice a day.  I emphasized that if we can not control the edema we will likely not be able to get this wound to heal.    I have again strongly encouraged her to use the pneumatic lymphedema pump once a day.  I have encouraged the patient to continue on their high protein diet to aid in wound healing.   The patient will return to the wound clinic to see me in 2 weeks.        30 minutes spent on the date of the encounter doing chart review, history and exam, documentation and further activities per the note      Lars Torres MD  11/14/2024   12:24 PM   [facility] Vascular/Wound  [phone]    This note was electronically signed by Lars Torres MD

## 2024-11-14 NOTE — DISCHARGE INSTRUCTIONS
11/14/2024   Carolina Pastranaamanda   1945    Plan 11/14/2024   Dressing changes outside of clinic are being performed by [facility] 3 times weekly   A DME order was not completed because the supplies are ordered by home care or at a care facility  [facility] phone [phone] fax [phone]   Continue to use lymphedema pumps on left leg and as tolerated on the right leg  Discuss with your home nurse to have them assist you applying the Lymphedema pump with their assistance before they leave your home      Wound Dressing Change: Right Anterior, Medial, Posterior, Lateral lower leg  (circumferential)  - Wash your hands with soap and water before you begin your dressing change and prepare a clean surface for dressings.  - Cleanse with mild unscented soap (such as Cetaphil, Cerave or Dove) and water  - Apply small amount of VASHE on gauze, lay into wound bed, let sit for 10 minutes, remove gauze (do not rinse)  - Apply Zinc Oxide to intact skin surrounding wounds   - Apply to UrgoTul contact layer or xeroform contact layer to wound beds  - Cover with superabsorbant dressing  - Secure with roll gauze and tape  - Apply Spandage size 8 from base of toes to ankle and size 9 ankle to knee  - Apply Velcro Compression    Change dressing daily and as needed if dressing becomes soiled or dislodged    Elevation:  It is recommended that you elevate your legs above the level of your heart for 30 minutes: approximately 2-3 times each day   Ways to do this:   - Lay on the couch or your bed and prop your legs up on pillows   - Recline back as far as you can go in your recliner and prop your legs on pillows.   Doing these things will help reduce the edema in your legs.     Compression:   Your compression is Compreflex and can be removed at night and put back on first thing in the morning.   Please remove compression dressing if toes turn blue and/or tingle and can not be relieved by raising the leg for one hour. If unable to  reapply in the morning, keep compression on until next dressing change.  Walk as much as you can, as you are able. Whenever you sit raise your ankle above your hips to promote wound healing.      Protein:  A diet high in protein is important for wound healing, we recommend getting 90 grams of protein per day.   Taking protein shakes or bars are a good way to get extra protein in your diet.   Good sources of protein:  Pork 26g per 3 oz  Whey protein powder - 24g per scoop (on average)  Greek yogurt - 23g per 8oz   Chicken or Turkey - 23g per 3oz  Fish - 20-25g per 3oz  Beef - 18-23g per 3oz  Tofu - 10g per 1/2 cup  Navy beans - 20g per cup  Cottage cheese - 14g per 1/2 cup   Lentils - 13g per 1/4 cup  Beef jerky 13g per 1oz  2% milk - 8g per cup  Peanut butter - 8g per 2 tablespoons  Eggs - 6g per egg  Mixed nuts - 6g per 2oz       Main Provider: Lars Torres M.D. November 14, 2024    Call us at [phone] if you have any questions about your wounds, if you have redness or swelling around your wound, have a fever of 101 degrees Fahrenheit or greater or if you have any other problems or concerns. We answer the phone Monday through Friday 8 am to 4 pm, please leave a message as we check the voicemail frequently throughout the day. If you have a concern over the weekend, please leave a message and we will return your call Monday. If the need is urgent, go to the ER or urgent care.    If you had a positive experience please indicate that on your patient satisfaction survey form that [facility] will be sending you.    It was a pleasure meeting with you today.  Thank you for allowing me and my team the privilege of caring for you today.  YOU are the reason we are here, and I truly hope we provided you with the excellent service you deserve.  Please let us know if there is anything else we can do for you so that we can be sure you are leaving completely satisfied with your care experience.      If you have  any billing related questions please call the [facility] Business office at [phone]. The clinic staff does not handle billing related matters.    If you are scheduled to have a follow up appointment, you will receive a reminder call the day before your visit. On the appointment day please arrive 15 minutes prior to your appointment time. If you are unable to keep that appointment, please call the clinic to cancel or reschedule. If you are more than 10 minutes late or greater for your scheduled appointment time, the clinic policy is that you may be asked to reschedule.

## 2024-11-14 NOTE — TELEPHONE ENCOUNTER
LM for pt to c/b     Per provider:  Does this patient have a follow up with ID? Ms. Elizabeth Kelley was hoping to be seen at the [facility] by her Wound specialist and I deferred the management to [facility].   Is it possible to clarify with the patient and RN at home care

## 2024-11-15 ENCOUNTER — TELEPHONE (OUTPATIENT)
Dept: WOUND CARE | Facility: CLINIC | Age: 79
End: 2024-11-15
Payer: COMMERCIAL

## 2024-11-15 NOTE — TELEPHONE ENCOUNTER
Returned call to patient and discussed that Dr. Torres does not prescribe pain medication and he is not in clinic today. Directed patient to contact her PCP. In tears she verbalized understanding. No further questions or concerns.

## 2024-11-15 NOTE — TELEPHONE ENCOUNTER
Patient left message she is experiencing a great deal of pain.  Looking to see if something can be prescribed for her.

## 2024-11-27 ENCOUNTER — HOSPITAL ENCOUNTER (OUTPATIENT)
Dept: WOUND CARE | Facility: CLINIC | Age: 79
Discharge: HOME | End: 2024-11-27
Attending: FAMILY MEDICINE
Payer: COMMERCIAL

## 2024-11-27 VITALS
SYSTOLIC BLOOD PRESSURE: 157 MMHG | RESPIRATION RATE: 16 BRPM | HEART RATE: 98 BPM | TEMPERATURE: 96.4 F | DIASTOLIC BLOOD PRESSURE: 81 MMHG

## 2024-11-27 DIAGNOSIS — R60.0 LOCALIZED EDEMA: Primary | ICD-10-CM

## 2024-11-27 DIAGNOSIS — I87.303 VENOUS HYPERTENSION OF LOWER EXTREMITY, BILATERAL: ICD-10-CM

## 2024-11-27 DIAGNOSIS — L97.912 ULCER OF RIGHT LOWER EXTREMITY WITH FAT LAYER EXPOSED (H): ICD-10-CM

## 2024-11-27 PROCEDURE — 97602 WOUND(S) CARE NON-SELECTIVE: CPT

## 2024-11-27 NOTE — PROGRESS NOTES
Wound Clinic Note         Visit date: 11/27/2024       Cheif Complaint:     Elizabeth Kelley is a 79 year old   female had concerns including WOUND CARE.  The patient has lower extremity edema and right leg ulcers         HISTORY OF PRESENT ILLNESS:    Elizabeth Kelley reports the right leg wound has been present since 2020.  The wound began due to the area being bumped.     The patient denies a history of congestive heart failure, previous vein procedures or previous DVT.     She is continue to have home health nurses coming out 3 times a week and then she has also been doing the dressing changes herself on the intervening days so that the bandages are always changed once a day.  The wounds have been bandaged with a zinc oxide barrier cream applied to the periwound followed by Xeroform, superabsorbent pads and either a multilayer compression wrap or her Velcro compression garments.  There is continued to be moderate to heavy serous drainage from the wounds.        The pateint denies fevers or chills.  They report the pain from the wound has been 0/10 and has remained about the same recently.      The patient reports propping up their legs occasionally during the day, but they do not elevate their legs above the level of their heart.  She still has not been elevating her legs above the level of her heart during the day.    She reports she tried using her pneumatic lymphedema pumps on the right leg but it was too painful, she continues to use them regularly on the left leg.    She confirms she takes Lasix once a day to help control her swelling.    Today the patient reports maintaining a high protein diet, but has not been taking protein supplements lately.        The patient denies a history of diabetes, smoking or chronic steroid use.         The patient has not had any symptoms of infection relating to the wound recently and is not currently on antibiotics.       Problem List:   Past Medical History:    Diagnosis Date    Ankle contracture, left     Class 3 severe obesity due to excess calories without serious comorbidity with body mass index (BMI) of 45.0 to 49.9 in adult (H)     Combined gastric and duodenal ulcer     Controlled substance agreement broken     Depression     Dyslipidemia     Edema     Edema     Gait abnormality     GERD (gastroesophageal reflux disease)     Gout     Lymphedema of both lower extremities     Melanoma (H)     left upper arm    MS (multiple sclerosis) (H)     RLS (restless legs syndrome)     Skin ulcer of left lower leg (H)     Valgus deformity of both feet     Vitamin D deficiency              Family Hx: family history includes Arthritis in her maternal grandmother, maternal uncle, paternal grandfather, paternal grandmother, and sister; Asthma in her sister; Brain Cancer in her father; Breast Cancer in her paternal aunt; Colon Cancer in her paternal aunt; Early Death in her maternal grandfather; Hyperlipidemia in her mother, paternal aunt, and sister; Hypertension in her mother, paternal aunt, and sister; Multiple Sclerosis in her mother and sister; Obesity in her sister; Uterine Cancer in her mother.       Surgical Hx:   Past Surgical History:   Procedure Laterality Date    ABLATION SAPHENOUS VEIN W/ RFA Right 04/12/2021    Saphenous vein, anterior accessory saphenous vein, sclerotherapy of multiple veins.    COSMETIC SURGERY  1988    reduction of excess skin from weight loss    ESOPHAGOSCOPY, GASTROSCOPY, DUODENOSCOPY (EGD), COMBINED N/A 03/30/2015    Procedure: UPPER ENDOSCOPY with gastric biopsy;  Surgeon: Checo Fletcher MD;  Location: [facility];  Service:     IRRIGATION AND DEBRIDEMENT LOWER EXTREMITY, COMBINED Right 3/21/2022    Procedure: RIGHT LEG WOUND DEBRIDEMENT, PAULIE DERMATONE, MISONIX, VAC VERA FLOW WITH VASHE;  Surgeon: Gabriele Bullock MD;  Location:  OR    IRRIGATION AND DEBRIDEMENT LOWER EXTREMITY, COMBINED Right 3/28/2022    Procedure: DEBRIDEMENT AND  WOUND DRESSING CHANGE AND MYRIAD APPLICATION OF RIGHT LOWER LEG WOUND;  Surgeon: Gabriele Bullock MD;  Location: [facility] OR    MAMMOPLASTY REDUCTION Bilateral     MOHS MICROGRAPHIC PROCEDURE      left upper arm, melanoma    PICC DOUBLE LUMEN PLACEMENT  7/21/2024    PICC SINGLE LUMEN PLACEMENT  8/13/2021         PICC SINGLE LUMEN PLACEMENT  9/2/2021         PICC SINGLE LUMEN PLACEMENT  9/8/2024    SPINE SURGERY      L5 area surgery, decompression          Allergies:    Allergies   Allergen Reactions    Other Environmental Allergy Anaphylaxis     Bees/Wasps Stings  Bees/Wasps Stings    Adhesive Tape Other (See Comments)     Red,itchy and oozes  Red,itchy and oozes    Adhesive [Cyanoacrylate] Unknown     Other reaction(s): sores    Latex Hives    Ragweeds Itching    Oxycodone-Acetaminophen Rash    Vicodin [Hydrocodone-Acetaminophen] Nausea and Vomiting and Hives              Medication History:    Current Outpatient Medications   Medication Sig Dispense Refill    acetaminophen (TYLENOL) 500 MG tablet Take 500 mg by mouth 2 times daily. 500 mg in the morning and 500 mg at noon      acetaminophen (TYLENOL) 500 MG tablet Take 1,000 mg by mouth every evening. (Patient not taking: Reported on 10/14/2024)      ascorbic acid (VITAMIN C) 250 MG CHEW chewable tablet Take 250 mg by mouth daily.      aspirin (ASPIRIN 81) 81 MG chewable tablet Take 1 tablet by mouth every morning      Bioflavonoid Products (CINDY-C/BIOFLAVONOIDS PO) Take 1 tablet by mouth every evening.      buPROPion (WELLBUTRIN SR) 150 MG 12 hr tablet Take 150 mg by mouth every morning       Carboxymethylcellulose Sodium 0.25 % SOLN Apply 1 drop to eye daily as needed      citalopram (CELEXA) 40 MG tablet Take 40 mg by mouth daily (with lunch)      ferrous sulfate (FEROSUL) 325 (65 Fe) MG tablet Take 325 mg by mouth three times a week      furosemide (LASIX) 20 MG tablet Take 20 mg by mouth 2 times daily      gabapentin (NEURONTIN) 100 MG capsule Take 100 mg by  mouth 3 times daily      magnesium oxide (MAG-OX) 400 MG tablet Take 1 tablet by mouth every morning      multivitamin w/minerals (CENTRUM ADULTS) tablet Take 1 tablet by mouth every morning      nitroGLYcerin (NITROSTAT) 0.4 MG sublingual tablet PLACE 1 TABLET UNDER TONGUE EVERY 5 MINTUES AS NEEDED FOR CHEST PAIN FOR UP TO 30 DAYS      pantoprazole (PROTONIX) 40 MG EC tablet Take 40 mg by mouth every morning      potassium chloride ER (K-TAB) 20 MEQ CR tablet Take 20 mEq by mouth every evening.      rOPINIRole (REQUIP) 1 MG tablet Take 0.5-1 mg by mouth daily as needed. At noon (Patient not taking: Reported on 10/14/2024)      rOPINIRole (REQUIP) 1 MG tablet Take 0.5 mg by mouth every morning In addition, patient takes one tablet (1 mg) in the evening      rOPINIRole (REQUIP) 1 MG tablet Take 1 mg by mouth every evening In addition, patient takes one half tablet (0.5 mg) in the morning      simvastatin (ZOCOR) 40 MG tablet [SIMVASTATIN (ZOCOR) 40 MG TABLET] Take 40 mg by mouth bedtime.      spironolactone (ALDACTONE) 25 MG tablet Take 25 mg by mouth every morning      vitamin B-Complex Take 1 tablet by mouth daily 60 tablet 3    vitamin D3 (CHOLECALCIFEROL) 250 mcg (26543 units) capsule Take 1 capsule (250 mcg) by mouth daily 60 capsule 3    Wound Cleansers (VASHE WOUND THERAPY EX) Externally apply topically daily as needed      zinc 50 MG TABS Take 1 tablet by mouth every morning       Current Facility-Administered Medications   Medication Dose Route Frequency Provider Last Rate Last Admin    lidocaine (XYLOCAINE) 5 % ointment   Topical Daily PRN         oxyCODONE (ROXICODONE) tablet 5 mg  5 mg Oral Q6H PRN Omero Shukla MD             Tobacco History:  reports that she quit smoking about 48 years ago. Her smoking use included cigarettes. She started smoking about 60 years ago. She has a 3 pack-year smoking history. She has never used smokeless tobacco.       REVIEW OF SYMPTOMS:   The review of systems was  negative except as noted in the HPI.           PHYSICAL EXAMINATION:     BP (!) 157/81 (BP Location: Left arm, Patient Position: Sitting)   Pulse 98   Temp (!) 96.4  F (35.8  C) (Temporal)   Resp 16            GENERAL: The patient overall appears well and is no acute distress.   HEAD: normocephalic   EYES: Sclera and conjunctiva clear   NECK: no obvious masses   LUNGS: breathing is unlabored.   EXTREMITIES: No clubbing, cyanosis or edema   SKIN: No rashes or other abnormalities except as noted under the Wound section below.   NEUROLOGICAL: normal motor and sensory function   EDEMA: Sever  and Lymphedema       WOUND: The wound appears healthy with no sign of infection.   Wound bed: granulation tissue   Periwound: healthy intact skin  I think there is some new skin here particularly on the medial aspect of the right leg.    Also see below for wound details:       Circumferential volume measures:            7/5/2024     9:00 AM 7/11/2024    11:00 AM 7/15/2024    11:00 AM 7/25/2024    11:00 AM 7/29/2024     1:00 PM   Circumferential Measures   Right just above MTP 26 23.5 26 24 22.7   Right Ankle 26 22.5 26 25 22.5   Right Widest Calf 63 58 58.5 58 55.5   Left - just above MTP 23 26 22 23 22.1   Left Ankle 24 23.2 24 23.5 23.3   Left Widest Calf 61 58 46 52.3 45.7       Ulceration(s)/Wound(s):   Please see the media tab under the chart review for pictures of the wounds.  Nursing staff removed dressings and cleansed wound.    Wound (used by OP [facility] only) 11/14/24 1150 leg Right lower;anterior;posterior;lateral;medial other (see comments) (Active)   Thickness/Stage full thickness 11/27/24 1414   Base slough;white;granulating;pink 11/27/24 1414   Periwound edematous;redness;macerated;swelling 11/27/24 1414   Periwound Temperature warm 11/27/24 1414   Periwound Skin Turgor soft 11/27/24 1414   Edges open 11/27/24 1414   Length (cm) 29 11/27/24 1414   Width (cm) 60.5 11/27/24 1414   Depth (cm) 0.3 11/27/24 1414   Wound  (cm^2) 1754.5 cm^2 11/27/24 1414   Wound Volume (cm^3) 526.35 cm^3 11/27/24 1414   Wound healing % -2.54 11/27/24 1414   Drainage Characteristics/Odor yellow;serosanguineous 11/27/24 1414   Drainage Amount copious 11/27/24 1414   Care, Wound non-select wound debridement performed. 11/27/24 1414                                             Recent Labs   Lab Test 04/04/22  0523 06/23/16  1130   A1C 5.4 5.9          Recent Labs   Lab Test 02/10/23  1535 05/25/22  1637 04/04/22  0523   ALBUMIN 4.3 4.1 1.9*      October 27, 2021 ankle-brachial indices: Normal  October 27, 2021 right lower extremity venous insufficiency ultrasound: Normal      No sharp debridement performed today.         ASSESSMENT:   This is a 79 year old  female with lower extremity edema and bilateral leg ulcers          PLAN:   The right leg will continue to be bandaged with a zinc oxide barrier cream applied to the periwound followed by Xeroform, superabsorbent pads and either a multilayer compression wrap applied by the home health nurses or the Velcro compression garment applied by the patient.      She previously had a bilateral lower extremity venous insufficiency ultrasound performed on August 3, 2023 which did show areas of superficial venous insufficiency.  She then later met with Dr. Floyd on November 13, 2023 to discuss treatment options for her venous insufficiency but he did not recommend any procedures at that time.    Separate from the discussion of her wound care we then discussed the treatment of her lower extremity edema.  I have again explained to the patient today that controlling the edema is probably the most important thing we can do to help heal the wound.  I have specifically recommended that they lay down with their legs above the level of the heart for 30 minutes at least twice a day.  I emphasized that if we can not control the edema we will likely not be able to get this wound to heal.    I suggested that she try using the  pneumatic lymphedema pump on the right leg every few weeks and as the wound continues to make progress she will eventually be able to tolerate using it again.  I have encouraged the patient to continue on their high protein diet to aid in wound healing.   The patient will return to the wound clinic to see me in 3-4 weeks.        30 minutes spent on the date of the encounter doing chart review, history and exam, documentation and further activities per the note      Lars Torres MD  11/27/2024   2:56 PM   [facility] V[facility]/Wound  [phone]    This note was electronically signed by Lars Torres MD

## 2024-11-27 NOTE — DISCHARGE INSTRUCTIONS
11/27/2024   Carolina Kelley   1945    A DME order was not completed because the supplies are ordered by home care or at a care facility    Dressing changes outside of clinic are being performed by [facility] 3 times weekly     [facility] phone [phone] fax [phone]     Plan 11/27/2024   Continue to use lymphedema pumps on left leg and as tolerated on the right leg  Discuss with your home nurse to have them assist you applying the Lymphedema pump with their assistance before they leave your home     Wound Dressing Change: Right Anterior, Medial, Posterior, Lateral lower leg  (circumferential)  - Wash your hands with soap and water before you begin your dressing change and prepare a clean surface for dressings.  - Cleanse with mild unscented soap (such as Cetaphil, Cerave or Dove) and water  - Apply small amount of VASHE on gauze, lay into wound bed, let sit for 10 minutes, remove gauze (do not rinse)  - Apply Zinc Oxide to intact skin surrounding wounds   - Apply to UrgoTul contact layer or xeroform contact layer to wound beds  - Cover with superabsorbant dressing  - Secure with kerlix and tape  - Apply Spandage size 8 from base of toes to knee  - Apply Velcro Compression    Change dressing daily and as needed if dressing becomes soiled or dislodged     Elevation:  It is recommended that you elevate your legs above the level of your heart for 30 minutes: approximately 2-3 times each day   Ways to do this:   - Lay on the couch or your bed and prop your legs up on pillows   - Recline back as far as you can go in your recliner and prop your legs on pillows.   Doing these things will help reduce the edema in your legs.      Compression:   Your compression is Compreflex and can be removed at night and put back on first thing in the morning.   Please remove compression dressing if toes turn blue and/or tingle and can not be relieved by raising the leg for one hour. If unable to reapply in the morning,  keep compression on until next dressing change.  Walk as much as you can, as you are able. Whenever you sit raise your ankle above your hips to promote wound healing.       Protein:  A diet high in protein is important for wound healing, we recommend getting 90 grams of protein per day.   Taking protein shakes or bars are a good way to get extra protein in your diet.   Good sources of protein:  Pork 26g per 3 oz  Whey protein powder - 24g per scoop (on average)  Greek yogurt - 23g per 8oz   Chicken or Turkey - 23g per 3oz  Fish - 20-25g per 3oz  Beef - 18-23g per 3oz  Tofu - 10g per 1/2 cup  Navy beans - 20g per cup  Cottage cheese - 14g per 1/2 cup   Lentils - 13g per 1/4 cup  Beef jerky 13g per 1oz  2% milk - 8g per cup  Peanut butter - 8g per 2 tablespoons  Eggs - 6g per egg  Mixed nuts - 6g per 2oz      Main Provider: Lars Torres M.D. November 27, 2024    Call us at [phone] if you have any questions about your wounds, if you have redness or swelling around your wound, have a fever of 101 degrees Fahrenheit or greater or if you have any other problems or concerns. We answer the phone Monday through Friday 8 am to 4 pm, please leave a message as we check the voicemail frequently throughout the day. If you have a concern over the weekend, please leave a message and we will return your call Monday. If the need is urgent, go to the ER or urgent care.    If you had a positive experience please indicate that on your patient satisfaction survey form that [facility] will be sending you.    It was a pleasure meeting with you today.  Thank you for allowing me and my team the privilege of caring for you today.  YOU are the reason we are here, and I truly hope we provided you with the excellent service you deserve.  Please let us know if there is anything else we can do for you so that we can be sure you are leaving completely satisfied with your care experience.      If you have any billing related  questions please call the [facility] Business office at [phone]. The clinic staff does not handle billing related matters.    If you are scheduled to have a follow up appointment, you will receive a reminder call the day before your visit. On the appointment day please arrive 15 minutes prior to your appointment time. If you are unable to keep that appointment, please call the clinic to cancel or reschedule. If you are more than 10 minutes late or greater for your scheduled appointment time, the clinic policy is that you may be asked to reschedule.

## 2024-12-17 ENCOUNTER — HOSPITAL ENCOUNTER (OUTPATIENT)
Dept: WOUND CARE | Facility: CLINIC | Age: 79
Discharge: HOME | End: 2024-12-17
Attending: FAMILY MEDICINE | Admitting: FAMILY MEDICINE
Payer: COMMERCIAL

## 2024-12-17 ENCOUNTER — TELEPHONE (OUTPATIENT)
Dept: WOUND CARE | Facility: CLINIC | Age: 79
End: 2024-12-17

## 2024-12-17 VITALS — DIASTOLIC BLOOD PRESSURE: 65 MMHG | HEART RATE: 79 BPM | SYSTOLIC BLOOD PRESSURE: 154 MMHG | TEMPERATURE: 97.5 F

## 2024-12-17 DIAGNOSIS — M79.89 OTHER SPECIFIED SOFT TISSUE DISORDERS: ICD-10-CM

## 2024-12-17 DIAGNOSIS — R60.0 LOCALIZED EDEMA: ICD-10-CM

## 2024-12-17 DIAGNOSIS — L97.912 ULCER OF RIGHT LOWER EXTREMITY WITH FAT LAYER EXPOSED (H): Primary | ICD-10-CM

## 2024-12-17 PROCEDURE — 99214 OFFICE O/P EST MOD 30 MIN: CPT | Performed by: FAMILY MEDICINE

## 2024-12-17 PROCEDURE — 97602 WOUND(S) CARE NON-SELECTIVE: CPT

## 2024-12-17 RX ORDER — NAPROXEN 500 MG/1
TABLET ORAL
COMMUNITY
Start: 2024-12-10

## 2024-12-17 NOTE — TELEPHONE ENCOUNTER
Please schedule patient for Right Venous competency      Mobility:    Does the patient use an assistive device? (i.e. walker, wheelchair) Wheelchair    Does the patient need assistance getting on/off the ultrasound table? Yes    Does the patient need assistance undressing/redressing the wounds? Yes         Wound wraps/dressings:    Is a 2 layer wrap being used? No(If yes, vein staff to assist patient in cutting this off.)    Is [place]ear being used? Yes (If yes, vein team not to cut, but able to remove dressing.)     Will the patient bring dressings to reapply after US? Yes    o   If not, can they be sent home with a chux to have home care or family member redress? Yes    o   Do they need a coordinated appointment for a nurse visit at wound to redress? No     If coordinated appointment required, wound nurse to alert charge nurse or  staff who will call Vein  at [phone] and/or Vascular schedulers [phone] to schedule the testing needed and coordinated wound clinic visit.    The dressing can always be removed if testing is ordered.     Please include in the order to be scheduled by [facility] or [facility], whichever is appropriate. Route to [facility] and/or [facility] scheduling pools. If the patient is a [facility] lift, route to clinic manager and charge nurse. They will help to get the patient scheduled at the appropriate place.    Routing to [facility] Scheduling Pool for Venous Competency     Lars Torres M.D.

## 2024-12-17 NOTE — PROGRESS NOTES
Wound Clinic Note         Visit date: 12/17/2024       Cheif Complaint:     Elizabeth Kelley is a 79 year old   female had concerns including WOUND CARE.  The patient has lower extremity edema and right leg ulcers         HISTORY OF PRESENT ILLNESS:    Elizabeth Kelley reports the right leg wound has been present since 2020.  The wound began due to the area being bumped.     The patient denies a history of congestive heart failure, previous vein procedures or previous DVT.     She is continue to have home health nurses coming out 3 times a week and then she has also been doing the dressing changes herself on the intervening days so that the bandages are always changed once a day.  The wounds have been bandaged with a zinc oxide barrier cream applied to the periwound followed by Xeroform, superabsorbent pads and either a Spandage stocking or her Velcro compression garments.  There is continued to be moderate to heavy serous drainage from the wounds.        The pateint denies fevers or chills.  They report the pain from the wound has been 0/10 and has remained about the same recently.      The patient reports they only occasionally lay down to elevate her legs above the level of their heart, but not twice a day.     She still reports that the pneumatic lymphedema pump is too painful to use on her right leg.    She confirms she takes Lasix once a day to help control her swelling.    Today the patient reports maintaining a high protein diet, but has not been taking protein supplements lately.        The patient denies a history of diabetes, smoking or chronic steroid use.         The patient has not had any symptoms of infection relating to the wound recently and is not currently on antibiotics.       Problem List:   Past Medical History:   Diagnosis Date    Ankle contracture, left     Class 3 severe obesity due to excess calories without serious comorbidity with body mass index (BMI) of 45.0 to 49.9 in adult (H)      Combined gastric and duodenal ulcer     Controlled substance agreement broken     Depression     Dyslipidemia     Edema     Edema     Gait abnormality     GERD (gastroesophageal reflux disease)     Gout     Lymphedema of both lower extremities     Melanoma (H)     left upper arm    MS (multiple sclerosis) (H)     RLS (restless legs syndrome)     Skin ulcer of left lower leg (H)     Valgus deformity of both feet     Vitamin D deficiency              Family Hx: family history includes Arthritis in her maternal grandmother, maternal uncle, paternal grandfather, paternal grandmother, and sister; Asthma in her sister; Brain Cancer in her father; Breast Cancer in her paternal aunt; Colon Cancer in her paternal aunt; Early Death in her maternal grandfather; Hyperlipidemia in her mother, paternal aunt, and sister; Hypertension in her mother, paternal aunt, and sister; Multiple Sclerosis in her mother and sister; Obesity in her sister; Uterine Cancer in her mother.       Surgical Hx:   Past Surgical History:   Procedure Laterality Date    ABLATION SAPHENOUS VEIN W/ RFA Right 04/12/2021    Saphenous vein, anterior accessory saphenous vein, sclerotherapy of multiple veins.    COSMETIC SURGERY  1988    reduction of excess skin from weight loss    ESOPHAGOSCOPY, GASTROSCOPY, DUODENOSCOPY (EGD), COMBINED N/A 03/30/2015    Procedure: UPPER ENDOSCOPY with gastric biopsy;  Surgeon: Checo Fletcher MD;  [facility];  Service:     IRRIGATION AND DEBRIDEMENT LOWER EXTREMITY, COMBINED Right 3/21/2022    Procedure: RIGHT LEG WOUND DEBRIDEMENT, PAULIE DERMATONE, MISONIX, VAC VERA FLOW WITH VASHE;  Surgeon: Gabriele Bullock MD;  Location:  OR    IRRIGATION AND DEBRIDEMENT LOWER EXTREMITY, COMBINED Right 3/28/2022    Procedure: DEBRIDEMENT AND WOUND DRESSING CHANGE AND MYRIAD APPLICATION OF RIGHT LOWER LEG WOUND;  Surgeon: Gabriele Bullock MD;  Location:  OR    MAMMOPLASTY REDUCTION Bilateral     MOHS MICROGRAPHIC  PROCEDURE      left upper arm, melanoma    PICC DOUBLE LUMEN PLACEMENT  7/21/2024    PICC SINGLE LUMEN PLACEMENT  8/13/2021         PICC SINGLE LUMEN PLACEMENT  9/2/2021         PICC SINGLE LUMEN PLACEMENT  9/8/2024    SPINE SURGERY      L5 area surgery, decompression          Allergies:    Allergies   Allergen Reactions    Other Environmental Allergy Anaphylaxis     Bees/Wasps Stings  Bees/Wasps Stings    Adhesive Tape Other (See Comments)     Red,itchy and oozes  Red,itchy and oozes    Adhesive [Cyanoacrylate] Unknown     Other reaction(s): sores    Latex Hives    Ragweeds Itching    Oxycodone-Acetaminophen Rash    Vicodin [Hydrocodone-Acetaminophen] Nausea and Vomiting and Hives              Medication History:    Current Outpatient Medications   Medication Sig Dispense Refill    naproxen (NAPROSYN) 500 MG tablet TAKE 1 TABLET BY MOUTH EVERY 12 HOURS WITH FOOD OR MILK AS NEEDED FOR 7 DAYS      acetaminophen (TYLENOL) 500 MG tablet Take 500 mg by mouth 2 times daily. 500 mg in the morning and 500 mg at noon      acetaminophen (TYLENOL) 500 MG tablet Take 1,000 mg by mouth every evening. (Patient not taking: Reported on 10/14/2024)      ascorbic acid (VITAMIN C) 250 MG CHEW chewable tablet Take 250 mg by mouth daily.      aspirin (ASPIRIN 81) 81 MG chewable tablet Take 1 tablet by mouth every morning      Bioflavonoid Products (CINDY-C/BIOFLAVONOIDS PO) Take 1 tablet by mouth every evening.      buPROPion (WELLBUTRIN SR) 150 MG 12 hr tablet Take 150 mg by mouth every morning       Carboxymethylcellulose Sodium 0.25 % SOLN Apply 1 drop to eye daily as needed      citalopram (CELEXA) 40 MG tablet Take 40 mg by mouth daily (with lunch)      ferrous sulfate (FEROSUL) 325 (65 Fe) MG tablet Take 325 mg by mouth three times a week      furosemide (LASIX) 20 MG tablet Take 20 mg by mouth 2 times daily      gabapentin (NEURONTIN) 100 MG capsule Take 100 mg by mouth 3 times daily      magnesium oxide (MAG-OX) 400 MG tablet  Take 1 tablet by mouth every morning      multivitamin w/minerals (CENTRUM ADULTS) tablet Take 1 tablet by mouth every morning      nitroGLYcerin (NITROSTAT) 0.4 MG sublingual tablet PLACE 1 TABLET UNDER TONGUE EVERY 5 MINTUES AS NEEDED FOR CHEST PAIN FOR UP TO 30 DAYS      pantoprazole (PROTONIX) 40 MG EC tablet Take 40 mg by mouth every morning      potassium chloride ER (K-TAB) 20 MEQ CR tablet Take 20 mEq by mouth every evening.      rOPINIRole (REQUIP) 1 MG tablet Take 0.5-1 mg by mouth daily as needed. At noon (Patient not taking: Reported on 10/14/2024)      rOPINIRole (REQUIP) 1 MG tablet Take 0.5 mg by mouth every morning In addition, patient takes one tablet (1 mg) in the evening      rOPINIRole (REQUIP) 1 MG tablet Take 1 mg by mouth every evening In addition, patient takes one half tablet (0.5 mg) in the morning      simvastatin (ZOCOR) 40 MG tablet [SIMVASTATIN (ZOCOR) 40 MG TABLET] Take 40 mg by mouth bedtime.      spironolactone (ALDACTONE) 25 MG tablet Take 25 mg by mouth every morning      vitamin B-Complex Take 1 tablet by mouth daily 60 tablet 3    vitamin D3 (CHOLECALCIFEROL) 250 mcg (48548 units) capsule Take 1 capsule (250 mcg) by mouth daily 60 capsule 3    Wound Cleansers (VASHE WOUND THERAPY EX) Externally apply topically daily as needed      zinc 50 MG TABS Take 1 tablet by mouth every morning       Current Facility-Administered Medications   Medication Dose Route Frequency Provider Last Rate Last Admin    lidocaine (XYLOCAINE) 5 % ointment   Topical Daily PRN         oxyCODONE (ROXICODONE) tablet 5 mg  5 mg Oral Q6H PRN Omero Shukla MD             Tobacco History:  reports that she quit smoking about 49 years ago. Her smoking use included cigarettes. She started smoking about 61 years ago. She has a 3 pack-year smoking history. She has never used smokeless tobacco.       REVIEW OF SYMPTOMS:   The review of systems was negative except as noted in the HPI.           PHYSICAL  EXAMINATION:     BP (!) 154/65 (BP Location: Right arm, Patient Position: Sitting, Cuff Size: Adult Regular)   Pulse 79   Temp 97.5  F (36.4  C) (Temporal)            GENERAL: The patient overall appears well and is no acute distress.   HEAD: normocephalic   EYES: Sclera and conjunctiva clear   NECK: no obvious masses   LUNGS: breathing is unlabored.   EXTREMITIES: No clubbing, cyanosis or edema   SKIN: No rashes or other abnormalities except as noted under the Wound section below.   NEUROLOGICAL: normal motor and sensory function   EDEMA: Sever  and Lymphedema       WOUND: The wound appears healthy with no sign of infection.   Wound bed: granulation tissue   Periwound: healthy intact skin  I think overall the open areas on her right leg are about the same compared with her last clinic visit.    Also see below for wound details:       Circumferential volume measures:            7/5/2024     9:00 AM 7/11/2024    11:00 AM 7/15/2024    11:00 AM 7/25/2024    11:00 AM 7/29/2024     1:00 PM   Circumferential Measures   Right just above MTP 26 23.5 26 24 22.7   Right Ankle 26 22.5 26 25 22.5   Right Widest Calf 63 58 58.5 58 55.5   Left - just above MTP 23 26 22 23 22.1   Left Ankle 24 23.2 24 23.5 23.3   Left Widest Calf 61 58 46 52.3 45.7       Ulceration(s)/Wound(s):   Please see the media tab under the chart review for pictures of the wounds.  Nursing staff removed dressings and cleansed wound.    Wound (used by OP [facility] only) 11/14/24 1150 leg Right lower;anterior;posterior;lateral;medial other (see comments) (Active)   Thickness/Stage full thickness 12/17/24 1102   Base slough;white;granulating;pink 12/17/24 1102   Periwound edematous;redness;macerated;swelling 12/17/24 1102   Periwound Temperature warm 12/17/24 1102   Periwound Skin Turgor soft 12/17/24 1102   Edges open 12/17/24 1102   Length (cm) 30.5 12/17/24 1102   Width (cm) 58.5 12/17/24 1102   Depth (cm) 0.3 12/17/24 1102   Wound (cm^2) 1784.25 cm^2 12/17/24  1102   Wound Volume (cm^3) 535.28 cm^3 12/17/24 1102   Wound healing % -4.28 12/17/24 1102   Drainage Characteristics/Odor serosanguineous 12/17/24 1102   Drainage Amount copious 12/17/24 1102   Care, Wound non-select wound debridement performed. 12/17/24 1102                                               Recent Labs   Lab Test 04/04/22  0523 06/23/16  1130   A1C 5.4 5.9          Recent Labs   Lab Test 02/10/23  1535 05/25/22  1637 04/04/22  0523   ALBUMIN 4.3 4.1 1.9*      October 27, 2021 ankle-brachial indices: Normal  October 27, 2021 right lower extremity venous insufficiency ultrasound: Normal      No sharp debridement performed today.         ASSESSMENT:   This is a 79 year old  female with lower extremity edema and right leg ulcers          PLAN:   The right leg will continue to be bandaged with a zinc oxide barrier cream applied to the periwound followed by Xeroform, superabsorbent pads and either a multilayer compression wrap applied by the home health nurses or the Velcro compression garment applied by the patient.    Today I suggested we get a new right leg venous insufficiency ultrasound to see if there is any new areas of venous insufficiency that could be treated, she was in agreement with this and I have entered an order.    She previously had a bilateral lower extremity venous insufficiency ultrasound performed on August 3, 2023 which did show areas of superficial venous insufficiency.  She then later met with Dr. Floyd on November 13, 2023 to discuss treatment options for her venous insufficiency but he did not recommend any procedures at that time.    Separate from the discussion of her wound care we then discussed the treatment of her lower extremity edema.  I have again explained to the patient today that controlling the edema is probably the most important thing we can do to help heal the wound.  I have specifically recommended that they lay down with their legs above the level of the heart for  30 minutes at least twice a day.  I emphasized that if we can not control the edema we will likely not be able to get this wound to heal.  I specifically encouraged her to make more of an effort to elevate her legs a second time in the later evening.  I suggested that she try using the pneumatic lymphedema pump on the right leg every few weeks and as the wound continues to make progress she will eventually be able to tolerate using it again.  I have encouraged the patient to continue on their high protein diet to aid in wound healing.   The patient will return to the wound clinic to see me in 3-4 weeks.        30 minutes spent on the date of the encounter doing chart review, history and exam, documentation and further activities per the note      Lars Torres MD  12/17/2024   12:01 PM   [facility]  [phone]    This note was electronically signed by Lars Torres MD

## 2024-12-17 NOTE — DISCHARGE INSTRUCTIONS
12/17/2024   Carolina Kelley   1945    A DME order was not completed because the supplies are ordered by home care or at a care facility  Dressing changes outside of clinic are being performed by [facility] 3 times weekly     Plan 12/17/2024   [facility] phone [phone] fax [phone]   Continue to use lymphedema pumps on left leg and as tolerated on the right leg  Discuss with your home nurse to have them assist you applying the Lymphedema pump with their assistance before they leave your home  Some keys to healing are Elevation, Compression, and controlling your Sodium intake!  We suggest following back up with [facility] for a new Right leg Venous Competency Ultrasound in [place] [phone] or [place] [phone].   If you do not hear from them in the next 1-2 days, please call to schedule that ultrasound     Wound Dressing Change: Right Anterior, Medial, Posterior, Lateral lower leg  (circumferential)  - Wash your hands with soap and water before you begin your dressing change and prepare a clean surface for dressings.  - Cleanse with mild unscented soap (such as Cetaphil, Cerave or Dove) and water  - Apply small amount of VASHE on gauze, lay into wound bed, let sit for 10 minutes, remove gauze (do not rinse)  - Apply Zinc Oxide to intact skin surrounding wounds   - Apply to UrgoTul contact layer or xeroform contact layer to wound beds  - Cover with superabsorbant dressing  - Secure with kerlix and tape  - Apply Red Stripe Edemawear from base of toes to knee  - Utilize Spandigrip/Tubigrip or Compreflex velcro wraps over top as tolerated  Change dressing daily and as needed if dressing becomes soiled or dislodged  You do not need to change the dressing on the days you are being seen at the wound clinic     Elevation:  It is recommended that you elevate your legs above the level of your heart for 30 minutes: approximately 2-3 times each day   This is especially important later in the day  after your leg has been down to gravity for many hours and has filled with fluid  Ways to do this:   - Lay on the couch or your bed and prop your legs up on pillows   - Recline back as far as you can go in your recliner and prop your legs on pillows.   Doing these things will help reduce the edema in your legs.      Compression:   Your compression is Compreflex and can be removed at night and put back on first thing in the morning.   Please remove compression dressing if toes turn blue and/or tingle and can not be relieved by raising the leg for one hour. If unable to reapply in the morning, keep compression on until next dressing change.  Walk as much as you can, as you are able. Whenever you sit raise your ankle above your hips to promote wound healing.       Protein:  A diet high in protein is important for wound healing, we recommend getting 90 grams of protein per day.   Continue twice daily protein shakes at this time.    ROUTINE FOOT CARE (NAIL TRIMMING / CALLUSES)  Go to [email or web address] ([employer]) and search for providers near you.  Otherwise, this is a list we have gathered of  recommended locations/providers in [place].  FYI: *Some providers accept insurance while others are out of pocket. Please contact them for details*    [facility]   [phone]   [facility]  [phone]  [email or web address]   [facility]  [phone]  [place] [facility]  [phone]  [email or web address]   [facility]  [address], [place] [address]  [phone] Rene Torres DPM  [address] [place] [address]   [phone]   [facility]   [address] [place] [address] & [address]   [phone] [facility]  Dr. Jensen Mccallum  [phone]  [place], [place]   [facility]  [phone]  [place] Locations  (does not take BCBS) [facility]    [facility]  [phone]         Main Provider: Lars Torres M.D. December 17, 2024    Call us at [phone] if you have any questions about your wounds, if you have redness or swelling around your wound, have a fever of 101 degrees Fahrenheit or greater or if you have any other problems or concerns. We answer the phone Monday through Friday 8 am to 4 pm, please leave a message as we check the voicemail frequently throughout the day. If you have a concern over the weekend, please leave a message and we will return your call Monday. If the need is urgent, go to the ER or urgent care.    If you had a positive experience please indicate that on your patient satisfaction survey form that [facility] will be sending you.    It was a pleasure meeting with you today.  Thank you for allowing me and my team the privilege of caring for you today.  YOU are the reason we are here, and I truly hope we provided you with the excellent service you deserve.  Please let us know if there is anything else we can do for you so that we can be sure you are leaving completely satisfied with your care experience.      If you have any billing related questions please call the [facility] office at [phone]. The clinic staff does not handle billing related matters.    If you are scheduled to have a follow up appointment, you will receive a reminder call the day before your visit. On the appointment day please arrive 15 minutes prior to your appointment time. If you are unable to keep that appointment, please call the clinic to cancel or reschedule. If you are more than 10 minutes late or greater for your scheduled appointment time, the clinic policy is that you may be asked to reschedule.

## 2024-12-18 ENCOUNTER — TELEPHONE (OUTPATIENT)
Dept: VASCULAR SURGERY | Facility: CLINIC | Age: 79
End: 2024-12-18
Payer: COMMERCIAL

## 2024-12-18 NOTE — TELEPHONE ENCOUNTER
Kimmy from [facility] just wanted to inform of the patients high blood pressure she had today. It was 165/82. Patient stated no headache or chest pain or anything. She said she did have coffee and had not taken her meds so the nurse is thinking it maybe from that but just wanted to let us know. If you have more questions you can call her at [phone]

## 2025-01-07 ENCOUNTER — TELEPHONE (OUTPATIENT)
Dept: WOUND CARE | Facility: CLINIC | Age: 80
End: 2025-01-07
Payer: COMMERCIAL

## 2025-01-07 NOTE — TELEPHONE ENCOUNTER
Patient called and cancelled 1-9-2025 appointment due to illness. VM returned, left message with options of 1-15-25 at 12:40 and 1-22 at 8:20.

## 2025-01-12 ENCOUNTER — APPOINTMENT (OUTPATIENT)
Dept: CT IMAGING | Facility: HOSPITAL | Age: 80
DRG: 871 | End: 2025-01-12
Attending: STUDENT IN AN ORGANIZED HEALTH CARE EDUCATION/TRAINING PROGRAM
Payer: COMMERCIAL

## 2025-01-12 ENCOUNTER — HOSPITAL ENCOUNTER (INPATIENT)
Facility: HOSPITAL | Age: 80
LOS: 6 days | Discharge: HOME | DRG: 871 | End: 2025-01-18
Attending: STUDENT IN AN ORGANIZED HEALTH CARE EDUCATION/TRAINING PROGRAM | Admitting: INTERNAL MEDICINE
Payer: COMMERCIAL

## 2025-01-12 DIAGNOSIS — R79.89 ELEVATED TROPONIN: ICD-10-CM

## 2025-01-12 DIAGNOSIS — L97.912 CHRONIC ULCER OF RIGHT LOWER EXTREMITY WITH FAT LAYER EXPOSED (H): Primary | ICD-10-CM

## 2025-01-12 DIAGNOSIS — A41.9 ACUTE SEPSIS (H): ICD-10-CM

## 2025-01-12 DIAGNOSIS — L97.919: ICD-10-CM

## 2025-01-12 DIAGNOSIS — L03.115 CELLULITIS OF RIGHT LOWER LEG: ICD-10-CM

## 2025-01-12 DIAGNOSIS — R09.02 HYPOXIA: ICD-10-CM

## 2025-01-12 LAB
ALBUMIN SERPL BCG-MCNC: 3.2 G/DL (ref 3.5–5.2)
ALBUMIN UR-MCNC: 20 MG/DL
ALP SERPL-CCNC: 161 U/L (ref 40–150)
ALT SERPL W P-5'-P-CCNC: 19 U/L (ref 0–50)
ANION GAP SERPL CALCULATED.3IONS-SCNC: 12 MMOL/L (ref 7–15)
APPEARANCE UR: CLEAR
AST SERPL W P-5'-P-CCNC: 21 U/L (ref 0–45)
BACTERIA #/AREA URNS HPF: ABNORMAL /HPF
BASOPHILS # BLD AUTO: 0 10E3/UL (ref 0–0.2)
BASOPHILS NFR BLD AUTO: 0 %
BILIRUB SERPL-MCNC: 0.2 MG/DL
BILIRUB UR QL STRIP: NEGATIVE
BUN SERPL-MCNC: 19.2 MG/DL (ref 8–23)
CALCIUM SERPL-MCNC: 9.4 MG/DL (ref 8.8–10.4)
CHLORIDE SERPL-SCNC: 102 MMOL/L (ref 98–107)
COLOR UR AUTO: ABNORMAL
CREAT SERPL-MCNC: 0.95 MG/DL (ref 0.51–0.95)
CRP SERPL-MCNC: 83.8 MG/L
EGFRCR SERPLBLD CKD-EPI 2021: 61 ML/MIN/1.73M2
EOSINOPHIL # BLD AUTO: 0.1 10E3/UL (ref 0–0.7)
EOSINOPHIL NFR BLD AUTO: 0 %
ERYTHROCYTE [DISTWIDTH] IN BLOOD BY AUTOMATED COUNT: 16.4 % (ref 10–15)
ERYTHROCYTE [SEDIMENTATION RATE] IN BLOOD BY WESTERGREN METHOD: 74 MM/HR (ref 0–30)
FLUAV RNA SPEC QL NAA+PROBE: NEGATIVE
FLUBV RNA RESP QL NAA+PROBE: NEGATIVE
GLUCOSE SERPL-MCNC: 96 MG/DL (ref 70–99)
GLUCOSE UR STRIP-MCNC: NEGATIVE MG/DL
HCO3 SERPL-SCNC: 23 MMOL/L (ref 22–29)
HCT VFR BLD AUTO: 33.4 % (ref 35–47)
HGB BLD-MCNC: 10.1 G/DL (ref 11.7–15.7)
HGB UR QL STRIP: NEGATIVE
IMM GRANULOCYTES # BLD: 0.1 10E3/UL
IMM GRANULOCYTES NFR BLD: 1 %
KETONES UR STRIP-MCNC: NEGATIVE MG/DL
LACTATE SERPL-SCNC: 1.5 MMOL/L (ref 0.7–2)
LEUKOCYTE ESTERASE UR QL STRIP: NEGATIVE
LYMPHOCYTES # BLD AUTO: 0.4 10E3/UL (ref 0.8–5.3)
LYMPHOCYTES NFR BLD AUTO: 3 %
MCH RBC QN AUTO: 24.6 PG (ref 26.5–33)
MCHC RBC AUTO-ENTMCNC: 30.2 G/DL (ref 31.5–36.5)
MCV RBC AUTO: 81 FL (ref 78–100)
MONOCYTES # BLD AUTO: 0.7 10E3/UL (ref 0–1.3)
MONOCYTES NFR BLD AUTO: 4 %
MUCOUS THREADS #/AREA URNS LPF: PRESENT /LPF
NEUTROPHILS # BLD AUTO: 15.7 10E3/UL (ref 1.6–8.3)
NEUTROPHILS NFR BLD AUTO: 92 %
NITRATE UR QL: NEGATIVE
NRBC # BLD AUTO: 0 10E3/UL
NRBC BLD AUTO-RTO: 0 /100
NT-PROBNP SERPL-MCNC: 224 PG/ML (ref 0–1800)
PH UR STRIP: 5 [PH] (ref 5–7)
PLATELET # BLD AUTO: 477 10E3/UL (ref 150–450)
POTASSIUM SERPL-SCNC: 4.5 MMOL/L (ref 3.4–5.3)
PROT SERPL-MCNC: 6.9 G/DL (ref 6.4–8.3)
RBC # BLD AUTO: 4.11 10E6/UL (ref 3.8–5.2)
RBC URINE: <1 /HPF
RSV RNA SPEC NAA+PROBE: NEGATIVE
SARS-COV-2 RNA RESP QL NAA+PROBE: NEGATIVE
SODIUM SERPL-SCNC: 137 MMOL/L (ref 135–145)
SP GR UR STRIP: 1.02 (ref 1–1.03)
SQUAMOUS EPITHELIAL: 1 /HPF
TROPONIN T SERPL HS-MCNC: 149 NG/L
TROPONIN T SERPL HS-MCNC: 176 NG/L
TROPONIN T SERPL HS-MCNC: 177 NG/L
TROPONIN T SERPL HS-MCNC: 187 NG/L
TROPONIN T SERPL HS-MCNC: 91 NG/L
UROBILINOGEN UR STRIP-MCNC: <2 MG/DL
WBC # BLD AUTO: 17.1 10E3/UL (ref 4–11)
WBC URINE: 2 /HPF

## 2025-01-12 PROCEDURE — 84484 ASSAY OF TROPONIN QUANT: CPT | Performed by: INTERNAL MEDICINE

## 2025-01-12 PROCEDURE — 84155 ASSAY OF PROTEIN SERUM: CPT

## 2025-01-12 PROCEDURE — 36415 COLL VENOUS BLD VENIPUNCTURE: CPT | Performed by: STUDENT IN AN ORGANIZED HEALTH CARE EDUCATION/TRAINING PROGRAM

## 2025-01-12 PROCEDURE — 250N000011 HC RX IP 250 OP 636: Performed by: STUDENT IN AN ORGANIZED HEALTH CARE EDUCATION/TRAINING PROGRAM

## 2025-01-12 PROCEDURE — 258N000003 HC RX IP 258 OP 636: Performed by: STUDENT IN AN ORGANIZED HEALTH CARE EDUCATION/TRAINING PROGRAM

## 2025-01-12 PROCEDURE — 83605 ASSAY OF LACTIC ACID: CPT

## 2025-01-12 PROCEDURE — 85652 RBC SED RATE AUTOMATED: CPT

## 2025-01-12 PROCEDURE — 87040 BLOOD CULTURE FOR BACTERIA: CPT

## 2025-01-12 PROCEDURE — 250N000011 HC RX IP 250 OP 636: Performed by: INTERNAL MEDICINE

## 2025-01-12 PROCEDURE — 83880 ASSAY OF NATRIURETIC PEPTIDE: CPT

## 2025-01-12 PROCEDURE — 36415 COLL VENOUS BLD VENIPUNCTURE: CPT

## 2025-01-12 PROCEDURE — 250N000011 HC RX IP 250 OP 636: Mod: JW

## 2025-01-12 PROCEDURE — 99223 1ST HOSP IP/OBS HIGH 75: CPT | Mod: AI | Performed by: INTERNAL MEDICINE

## 2025-01-12 PROCEDURE — 84484 ASSAY OF TROPONIN QUANT: CPT

## 2025-01-12 PROCEDURE — 96375 TX/PRO/DX INJ NEW DRUG ADDON: CPT

## 2025-01-12 PROCEDURE — 250N000013 HC RX MED GY IP 250 OP 250 PS 637: Performed by: INTERNAL MEDICINE

## 2025-01-12 PROCEDURE — 250N000013 HC RX MED GY IP 250 OP 250 PS 637

## 2025-01-12 PROCEDURE — 81001 URINALYSIS AUTO W/SCOPE: CPT | Performed by: STUDENT IN AN ORGANIZED HEALTH CARE EDUCATION/TRAINING PROGRAM

## 2025-01-12 PROCEDURE — 36415 COLL VENOUS BLD VENIPUNCTURE: CPT | Performed by: INTERNAL MEDICINE

## 2025-01-12 PROCEDURE — 71275 CT ANGIOGRAPHY CHEST: CPT

## 2025-01-12 PROCEDURE — 86140 C-REACTIVE PROTEIN: CPT

## 2025-01-12 PROCEDURE — 96366 THER/PROPH/DIAG IV INF ADDON: CPT

## 2025-01-12 PROCEDURE — 96365 THER/PROPH/DIAG IV INF INIT: CPT

## 2025-01-12 PROCEDURE — 84484 ASSAY OF TROPONIN QUANT: CPT | Performed by: STUDENT IN AN ORGANIZED HEALTH CARE EDUCATION/TRAINING PROGRAM

## 2025-01-12 PROCEDURE — 87637 SARSCOV2&INF A&B&RSV AMP PRB: CPT | Performed by: STUDENT IN AN ORGANIZED HEALTH CARE EDUCATION/TRAINING PROGRAM

## 2025-01-12 PROCEDURE — 99285 EMERGENCY DEPT VISIT HI MDM: CPT | Mod: 25

## 2025-01-12 PROCEDURE — 85004 AUTOMATED DIFF WBC COUNT: CPT

## 2025-01-12 PROCEDURE — 258N000003 HC RX IP 258 OP 636

## 2025-01-12 PROCEDURE — 120N000004 HC R&B MS OVERFLOW

## 2025-01-12 PROCEDURE — 93005 ELECTROCARDIOGRAM TRACING: CPT

## 2025-01-12 RX ORDER — ROPINIROLE 1 MG/1
1 TABLET, FILM COATED ORAL EVERY EVENING
Status: DISCONTINUED | OUTPATIENT
Start: 2025-01-12 | End: 2025-01-18 | Stop reason: HOSPADM

## 2025-01-12 RX ORDER — GABAPENTIN 100 MG/1
100 CAPSULE ORAL 3 TIMES DAILY
Status: DISCONTINUED | OUTPATIENT
Start: 2025-01-12 | End: 2025-01-18 | Stop reason: HOSPADM

## 2025-01-12 RX ORDER — AMOXICILLIN 250 MG
2 CAPSULE ORAL 2 TIMES DAILY PRN
Status: DISCONTINUED | OUTPATIENT
Start: 2025-01-12 | End: 2025-01-18 | Stop reason: HOSPADM

## 2025-01-12 RX ORDER — ACETAMINOPHEN 500 MG
1000 TABLET ORAL ONCE
Status: COMPLETED | OUTPATIENT
Start: 2025-01-12 | End: 2025-01-12

## 2025-01-12 RX ORDER — OXYCODONE HYDROCHLORIDE 5 MG/1
5 TABLET ORAL EVERY 4 HOURS PRN
Status: DISCONTINUED | OUTPATIENT
Start: 2025-01-12 | End: 2025-01-18 | Stop reason: HOSPADM

## 2025-01-12 RX ORDER — NALOXONE HYDROCHLORIDE 0.4 MG/ML
0.2 INJECTION, SOLUTION INTRAMUSCULAR; INTRAVENOUS; SUBCUTANEOUS
Status: DISCONTINUED | OUTPATIENT
Start: 2025-01-12 | End: 2025-01-18 | Stop reason: HOSPADM

## 2025-01-12 RX ORDER — BUPROPION HYDROCHLORIDE 150 MG/1
150 TABLET, EXTENDED RELEASE ORAL EVERY MORNING
Status: DISCONTINUED | OUTPATIENT
Start: 2025-01-13 | End: 2025-01-18 | Stop reason: HOSPADM

## 2025-01-12 RX ORDER — NYSTATIN 100000 [USP'U]/G
POWDER TOPICAL 2 TIMES DAILY PRN
COMMUNITY

## 2025-01-12 RX ORDER — NALOXONE HYDROCHLORIDE 0.4 MG/ML
0.4 INJECTION, SOLUTION INTRAMUSCULAR; INTRAVENOUS; SUBCUTANEOUS
Status: DISCONTINUED | OUTPATIENT
Start: 2025-01-12 | End: 2025-01-18 | Stop reason: HOSPADM

## 2025-01-12 RX ORDER — ENOXAPARIN SODIUM 100 MG/ML
40 INJECTION SUBCUTANEOUS EVERY 12 HOURS
Status: DISCONTINUED | OUTPATIENT
Start: 2025-01-12 | End: 2025-01-18 | Stop reason: HOSPADM

## 2025-01-12 RX ORDER — CALCIUM CARBONATE 500 MG/1
1000 TABLET, CHEWABLE ORAL 4 TIMES DAILY PRN
Status: DISCONTINUED | OUTPATIENT
Start: 2025-01-12 | End: 2025-01-18 | Stop reason: HOSPADM

## 2025-01-12 RX ORDER — PANTOPRAZOLE SODIUM 20 MG/1
40 TABLET, DELAYED RELEASE ORAL EVERY MORNING
Status: DISCONTINUED | OUTPATIENT
Start: 2025-01-13 | End: 2025-01-18 | Stop reason: HOSPADM

## 2025-01-12 RX ORDER — SPIRONOLACTONE 25 MG/1
25 TABLET ORAL EVERY MORNING
Status: DISCONTINUED | OUTPATIENT
Start: 2025-01-13 | End: 2025-01-18 | Stop reason: HOSPADM

## 2025-01-12 RX ORDER — SIMVASTATIN 40 MG
40 TABLET ORAL AT BEDTIME
Status: DISCONTINUED | OUTPATIENT
Start: 2025-01-12 | End: 2025-01-18 | Stop reason: HOSPADM

## 2025-01-12 RX ORDER — MEROPENEM 1 G/1
1 INJECTION, POWDER, FOR SOLUTION INTRAVENOUS EVERY 8 HOURS
Status: DISCONTINUED | OUTPATIENT
Start: 2025-01-12 | End: 2025-01-17

## 2025-01-12 RX ORDER — AMOXICILLIN 250 MG
1 CAPSULE ORAL 2 TIMES DAILY PRN
Status: DISCONTINUED | OUTPATIENT
Start: 2025-01-12 | End: 2025-01-18 | Stop reason: HOSPADM

## 2025-01-12 RX ORDER — MAGNESIUM OXIDE 400 MG/1
400 TABLET ORAL EVERY MORNING
Status: DISCONTINUED | OUTPATIENT
Start: 2025-01-13 | End: 2025-01-18 | Stop reason: HOSPADM

## 2025-01-12 RX ORDER — VANCOMYCIN HYDROCHLORIDE 1 G/200ML
1000 INJECTION, SOLUTION INTRAVENOUS EVERY 24 HOURS
Status: DISCONTINUED | OUTPATIENT
Start: 2025-01-13 | End: 2025-01-15

## 2025-01-12 RX ORDER — FENTANYL CITRATE 50 UG/ML
50 INJECTION, SOLUTION INTRAMUSCULAR; INTRAVENOUS ONCE
Status: COMPLETED | OUTPATIENT
Start: 2025-01-12 | End: 2025-01-12

## 2025-01-12 RX ORDER — HYDROMORPHONE HYDROCHLORIDE 1 MG/ML
0.5 INJECTION, SOLUTION INTRAMUSCULAR; INTRAVENOUS; SUBCUTANEOUS
Status: DISCONTINUED | OUTPATIENT
Start: 2025-01-12 | End: 2025-01-15

## 2025-01-12 RX ORDER — CEFTRIAXONE 2 G/1
2 INJECTION, POWDER, FOR SOLUTION INTRAMUSCULAR; INTRAVENOUS ONCE
Status: COMPLETED | OUTPATIENT
Start: 2025-01-12 | End: 2025-01-12

## 2025-01-12 RX ORDER — FUROSEMIDE 20 MG/1
20 TABLET ORAL DAILY
Status: DISCONTINUED | OUTPATIENT
Start: 2025-01-12 | End: 2025-01-18 | Stop reason: HOSPADM

## 2025-01-12 RX ORDER — OXYCODONE HYDROCHLORIDE 5 MG/1
2.5-5 TABLET ORAL EVERY 6 HOURS PRN
COMMUNITY

## 2025-01-12 RX ORDER — CITALOPRAM HYDROBROMIDE 40 MG/1
40 TABLET ORAL
Status: DISCONTINUED | OUTPATIENT
Start: 2025-01-13 | End: 2025-01-18 | Stop reason: HOSPADM

## 2025-01-12 RX ORDER — HYDROMORPHONE HYDROCHLORIDE 1 MG/ML
0.3 INJECTION, SOLUTION INTRAMUSCULAR; INTRAVENOUS; SUBCUTANEOUS ONCE
Status: COMPLETED | OUTPATIENT
Start: 2025-01-12 | End: 2025-01-12

## 2025-01-12 RX ORDER — ASCORBIC ACID 250 MG
250 TABLET,CHEWABLE ORAL DAILY
Status: DISCONTINUED | OUTPATIENT
Start: 2025-01-13 | End: 2025-01-12

## 2025-01-12 RX ORDER — ROPINIROLE 0.25 MG/1
0.5 TABLET, FILM COATED ORAL EVERY MORNING
Status: DISCONTINUED | OUTPATIENT
Start: 2025-01-13 | End: 2025-01-18 | Stop reason: HOSPADM

## 2025-01-12 RX ORDER — POTASSIUM CHLORIDE 750 MG/1
20 TABLET, EXTENDED RELEASE ORAL 2 TIMES DAILY
Status: DISCONTINUED | OUTPATIENT
Start: 2025-01-12 | End: 2025-01-18 | Stop reason: HOSPADM

## 2025-01-12 RX ORDER — ASPIRIN 81 MG/1
81 TABLET, CHEWABLE ORAL EVERY MORNING
Status: DISCONTINUED | OUTPATIENT
Start: 2025-01-13 | End: 2025-01-18 | Stop reason: HOSPADM

## 2025-01-12 RX ORDER — IOPAMIDOL 755 MG/ML
90 INJECTION, SOLUTION INTRAVASCULAR ONCE
Status: COMPLETED | OUTPATIENT
Start: 2025-01-12 | End: 2025-01-12

## 2025-01-12 RX ORDER — ACETAMINOPHEN 325 MG/1
975 TABLET ORAL 3 TIMES DAILY
Status: DISCONTINUED | OUTPATIENT
Start: 2025-01-12 | End: 2025-01-18 | Stop reason: HOSPADM

## 2025-01-12 RX ORDER — LIDOCAINE 40 MG/G
CREAM TOPICAL
Status: DISCONTINUED | OUTPATIENT
Start: 2025-01-12 | End: 2025-01-18 | Stop reason: HOSPADM

## 2025-01-12 RX ADMIN — CEFTRIAXONE SODIUM 2 G: 2 INJECTION, POWDER, FOR SOLUTION INTRAMUSCULAR; INTRAVENOUS at 11:22

## 2025-01-12 RX ADMIN — IOPAMIDOL 90 ML: 755 INJECTION, SOLUTION INTRAVENOUS at 14:11

## 2025-01-12 RX ADMIN — ROPINIROLE HYDROCHLORIDE 1 MG: 1 TABLET, FILM COATED ORAL at 21:30

## 2025-01-12 RX ADMIN — SIMVASTATIN 40 MG: 40 TABLET, FILM COATED ORAL at 21:30

## 2025-01-12 RX ADMIN — HYDROMORPHONE HYDROCHLORIDE 0.3 MG: 1 INJECTION, SOLUTION INTRAMUSCULAR; INTRAVENOUS; SUBCUTANEOUS at 13:18

## 2025-01-12 RX ADMIN — ACETAMINOPHEN 1000 MG: 500 TABLET, FILM COATED ORAL at 10:50

## 2025-01-12 RX ADMIN — GABAPENTIN 100 MG: 100 CAPSULE ORAL at 19:46

## 2025-01-12 RX ADMIN — HYDROMORPHONE HYDROCHLORIDE 0.5 MG: 1 INJECTION, SOLUTION INTRAMUSCULAR; INTRAVENOUS; SUBCUTANEOUS at 21:44

## 2025-01-12 RX ADMIN — SODIUM CHLORIDE 1674 ML: 9 INJECTION, SOLUTION INTRAVENOUS at 15:39

## 2025-01-12 RX ADMIN — SODIUM CHLORIDE 717 ML: 9 INJECTION, SOLUTION INTRAVENOUS at 11:22

## 2025-01-12 RX ADMIN — FENTANYL CITRATE 50 MCG: 50 INJECTION, SOLUTION INTRAMUSCULAR; INTRAVENOUS at 11:05

## 2025-01-12 RX ADMIN — HYDROMORPHONE HYDROCHLORIDE 0.5 MG: 1 INJECTION, SOLUTION INTRAMUSCULAR; INTRAVENOUS; SUBCUTANEOUS at 18:19

## 2025-01-12 RX ADMIN — ACETAMINOPHEN 975 MG: 325 TABLET ORAL at 19:44

## 2025-01-12 RX ADMIN — MEROPENEM 1 G: 1 INJECTION, POWDER, FOR SOLUTION INTRAVENOUS at 18:19

## 2025-01-12 RX ADMIN — ENOXAPARIN SODIUM 40 MG: 40 INJECTION SUBCUTANEOUS at 19:44

## 2025-01-12 RX ADMIN — SODIUM CHLORIDE 2000 MG: 9 INJECTION, SOLUTION INTRAVENOUS at 11:34

## 2025-01-12 RX ADMIN — POTASSIUM CHLORIDE 20 MEQ: 750 TABLET, EXTENDED RELEASE ORAL at 19:44

## 2025-01-12 ASSESSMENT — COLUMBIA-SUICIDE SEVERITY RATING SCALE - C-SSRS
6. HAVE YOU EVER DONE ANYTHING, STARTED TO DO ANYTHING, OR PREPARED TO DO ANYTHING TO END YOUR LIFE?: NO
1. IN THE PAST MONTH, HAVE YOU WISHED YOU WERE DEAD OR WISHED YOU COULD GO TO SLEEP AND NOT WAKE UP?: NO
2. HAVE YOU ACTUALLY HAD ANY THOUGHTS OF KILLING YOURSELF IN THE PAST MONTH?: NO

## 2025-01-12 ASSESSMENT — ACTIVITIES OF DAILY LIVING (ADL)
ADLS_ACUITY_SCORE: 57
ADLS_ACUITY_SCORE: 39
ADLS_ACUITY_SCORE: 57
ADLS_ACUITY_SCORE: 57
ADLS_ACUITY_SCORE: 39
ADLS_ACUITY_SCORE: 57
ADLS_ACUITY_SCORE: 39

## 2025-01-12 NOTE — H&P
[facility]    History and Physical - Hospitalist Service       Date of Admission:  1/12/2025    Assessment & Plan      Elizabeth Kelley is a 79 year old female with chronic right leg lymphedema and venous stasis admitted on 1/12/2025 for recurrent right lower leg cellulitis and sepsis.     Right lower extremity cellulitis, Sepsis  Presents with right lower leg increased erythema, swelling and pain for one week.  Has fever, leukocytosis, and tachycardia. Meets criteria for sepsis.  Previous wound culture grew Pseudomonas, MSSA, Proteus, and Stenotrophomonas   History of chronic venous stasis ulcer limited to the skin and lymphedema.  - Empirically start Meropenem and Vancomycin  - Repeat wound culture  - ID consult  - Continue wound care    Elevated troponin:  Troponin trended up from 91 to 149 to 187.  Patient reports no chest pain and SOB.   EKG shows sinus tachycardia.  - Telemetry  - Per ER discussion with cardiology, likely due to demand ischemia from sepsis. Hold heparin drip. Continue to trend troponin. If it continues to trend up, low threshold to start heparin drip.  - Echocardiogram    Hypoxia:   Briefly needed supplemental oxygen at 2 LPM and now no longer needed. CT chest shows no PE and focal infiltrate. Likely due to decreased breathing while taking pain medication. Continue to monitor.     Chronic bilateral lower extremity lymphedema:  - Continue PTA lasix and spironolactone    Restless leg syndrome: Continue PTA ropinirole.     Anxiety and depression: Continue PTA Wellbutrin and Celexa.          Diet:  Regular diet  DVT Prophylaxis: Enoxaparin (Lovenox) SQ  Amato Catheter: Not present  Lines: None     Cardiac Monitoring: None  Code Status:  Full code    Clinically Significant Risk Factors Present on Admission               # Hypoalbuminemia: Lowest albumin = 3.2 g/dL at 1/12/2025 11:04 AM, will monitor as appropriate   # Drug Induced Platelet Defect: home medication list  "includes an antiplatelet medication   # Hypertension: Noted on problem list      # Anemia: based on hgb <11       # Severe Obesity: Estimated body mass index is 46.48 kg/m  as calculated from the following:    Height as of this encounter: 1.549 m (5' 1\").    Weight as of this encounter: 111.6 kg (246 lb).       # Financial/Environmental Concerns:           Disposition Plan     Medically Ready for Discharge: Anticipated in 2-4 Days           Vincent Weinberg MD  Hospitalist Service  [facility]  Securely message with [employer] (more info)  Text page via [facility] Paging/Directory     ______________________________________________________________________    Chief Complaint   Right lower leg pain and swelling    History is obtained from the patient    History of Present Illness   Elizabeth Kelley is a 79 year old female with history of chronic right lower extremity venous ulcer, lymphedema of bilateral lower extremities, HTN, HLD, multiple sclerosis, restless legs, morbid obesity and depression, who presents to ER for evaluation of right lower leg pain and swelling. Patient has chronic venous stasis ulcer of her right lower leg. She was admitted for right lower leg cellulitis and sepsis 3 months ago in September. She was treated with Vancomycin and Zosyn. She was discharged with meropenem for 7 more days plus minocycline for 10 days. She has been following up in the ID and vascular clinics. Home care nurse does wound care for her 3 times a week and she does the rest days. One week ago, she had gastroenteritis due to Norovirus. After that, she developed increased pain, swelling and foul smelling discharge from her right lower leg wound. She concerns infection so that she came to ER today.   She reports no fever, chest pain, SOB, abdominal pain, nausea and vomiting. Her GI symptoms from the Norovirus infection have resolved.     Past Medical History    Past Medical History:   Diagnosis Date    Ankle " contracture, left     Class 3 severe obesity due to excess calories without serious comorbidity with body mass index (BMI) of 45.0 to 49.9 in adult (H)     Combined gastric and duodenal ulcer     Controlled substance agreement broken     Depression     Dyslipidemia     Edema     Edema     Gait abnormality     GERD (gastroesophageal reflux disease)     Gout     Lymphedema of both lower extremities     Melanoma (H)     left upper arm    MS (multiple sclerosis) (H)     RLS (restless legs syndrome)     Skin ulcer of left lower leg (H)     Valgus deformity of both feet     Vitamin D deficiency        Past Surgical History   Past Surgical History:   Procedure Laterality Date    ABLATION SAPHENOUS VEIN W/ RFA Right 04/12/2021    Saphenous vein, anterior accessory saphenous vein, sclerotherapy of multiple veins.    COSMETIC SURGERY  1988    reduction of excess skin from weight loss    ESOPHAGOSCOPY, GASTROSCOPY, DUODENOSCOPY (EGD), COMBINED N/A 03/30/2015    Procedure: UPPER ENDOSCOPY with gastric biopsy;  Surgeon: Checo Fletcher MD;  Location: [facility];  Service:     IRRIGATION AND DEBRIDEMENT LOWER EXTREMITY, COMBINED Right 3/21/2022    Procedure: RIGHT LEG WOUND DEBRIDEMENT, PAULIE DERMATONE, MISONIX, VAC VERA FLOW WITH VASHE;  Surgeon: Gabriele Bullock MD;  Location:  OR    IRRIGATION AND DEBRIDEMENT LOWER EXTREMITY, COMBINED Right 3/28/2022    Procedure: DEBRIDEMENT AND WOUND DRESSING CHANGE AND MYRIAD APPLICATION OF RIGHT LOWER LEG WOUND;  Surgeon: Gabriele Bullock MD;  Location:  OR    MAMMOPLASTY REDUCTION Bilateral     MOHS MICROGRAPHIC PROCEDURE      left upper arm, melanoma    PICC DOUBLE LUMEN PLACEMENT  7/21/2024    PICC SINGLE LUMEN PLACEMENT  8/13/2021         PICC SINGLE LUMEN PLACEMENT  9/2/2021         PICC SINGLE LUMEN PLACEMENT  9/8/2024    SPINE SURGERY      L5 area surgery, decompression       Prior to Admission Medications   Prior to Admission Medications   Prescriptions Last Dose  Informant Patient Reported? Taking?   Carboxymethylcellulose Sodium 0.25 % SOLN 1/11/2025 Evening  Yes Yes   Sig: Apply 1 drop to eye daily as needed   Wound Cleansers (VASHE WOUND THERAPY EX)   Yes No   Sig: Externally apply topically daily as needed   acetaminophen (TYLENOL) 500 MG tablet 1/11/2025 Evening  Yes Yes   Sig: Take 1,000 mg by mouth daily as needed for pain.   ascorbic acid (VITAMIN C) 250 MG CHEW chewable tablet 1/11/2025 Morning  Yes Yes   Sig: Take 250 mg by mouth daily.   aspirin (ASPIRIN 81) 81 MG chewable tablet 1/11/2025 Morning  Yes Yes   Sig: Take 1 tablet by mouth every morning   buPROPion (WELLBUTRIN SR) 150 MG 12 hr tablet 1/11/2025 Morning Self Yes Yes   Sig: Take 150 mg by mouth every morning    citalopram (CELEXA) 40 MG tablet 1/11/2025 Morning Self Yes Yes   Sig: Take 40 mg by mouth daily (with lunch)   ferrous sulfate (FEROSUL) 325 (65 Fe) MG tablet 1/10/2025 Morning  Yes Yes   Sig: Take 325 mg by mouth every other day.   furosemide (LASIX) 20 MG tablet 1/11/2025 Morning  Yes Yes   Sig: Take 20 mg by mouth daily.   gabapentin (NEURONTIN) 100 MG capsule 1/11/2025 Evening  Yes Yes   Sig: Take 100 mg by mouth 3 times daily   magnesium oxide (MAG-OX) 400 MG tablet 1/11/2025 Morning  Yes Yes   Sig: Take 1 tablet by mouth every morning   multivitamin w/minerals (CENTRUM ADULTS) tablet 1/11/2025 Morning Self Yes Yes   Sig: Take 1 tablet by mouth every morning   naproxen (NAPROSYN) 500 MG tablet 1/11/2025 Noon  Yes Yes   Sig: TAKE 1 TABLET BY MOUTH EVERY 12 HOURS WITH FOOD OR MILK AS NEEDED FOR 7 DAYS   nitroGLYcerin (NITROSTAT) 0.4 MG sublingual tablet Unknown  Yes Yes   Sig: PLACE 1 TABLET UNDER TONGUE EVERY 5 MINTUES AS NEEDED FOR CHEST PAIN FOR UP TO 30 DAYS   nystatin (MYCOSTATIN) 023663 UNIT/GM external powder 1/11/2025 Morning  Yes Yes   Sig: Apply topically 2 times daily as needed.   oxyCODONE (ROXICODONE) 5 MG tablet More than a month  Yes Yes   Sig: Take 2.5-5 mg by mouth every 6  hours as needed for severe pain.   pantoprazole (PROTONIX) 40 MG EC tablet 1/11/2025 Morning  Yes Yes   Sig: Take 40 mg by mouth every morning   potassium chloride ER (K-TAB) 20 MEQ CR tablet 1/11/2025 Morning  Yes Yes   Sig: Take 20 mEq by mouth 2 times daily.   rOPINIRole (REQUIP) 1 MG tablet 1/11/2025 Evening Self Yes Yes   Sig: Take 1 mg by mouth every evening In addition, patient takes one half tablet (0.5 mg) in the morning   rOPINIRole (REQUIP) 1 MG tablet 1/11/2025 Morning  Yes Yes   Sig: Take 0.5 mg by mouth every morning In addition, patient takes one tablet (1 mg) in the evening   rOPINIRole (REQUIP) 1 MG tablet 1/11/2025 Noon  Yes Yes   Sig: Take 0.5-1 mg by mouth daily as needed. At noon   simvastatin (ZOCOR) 40 MG tablet 1/11/2025 Evening Self Yes Yes   Sig: [SIMVASTATIN (ZOCOR) 40 MG TABLET] Take 40 mg by mouth bedtime.   spironolactone (ALDACTONE) 25 MG tablet 1/11/2025 Morning Self Yes Yes   Sig: Take 25 mg by mouth every morning   vitamin B-Complex 1/10/2025 Morning  Yes Yes   Sig: Take 1 tablet by mouth daily.   vitamin D3 (CHOLECALCIFEROL) 250 mcg (56919 units) capsule 1/11/2025 Morning  Yes Yes   Sig: Take 1 capsule by mouth daily.   zinc 50 MG TABS 1/11/2025 Morning  Yes Yes   Sig: Take 1 tablet by mouth three times a week. On Tues, Thur, Sat      Facility-Administered Medications: None        Review of Systems    The 10 point Review of Systems is negative other than noted in the HPI or here.      Physical Exam   Vital Signs: Temp: (!) 103  F (39.4  C) Temp src: Oral BP: 114/46 Pulse: 92   Resp: 13 SpO2: 93 % O2 Device: Nasal cannula Oxygen Delivery: 2 LPM  Weight: 246 lbs 0 oz    General appearance: not in acute distress  HEENT: PERRL, EOMI  Lungs: Clear breath sounds in bilateral lung fields  Cardiovascular: Regular rate and rhythm, normal S1-S2  Abdomen: Soft, non tender, no distension  Musculoskeletal: No joint swelling  Skin: Right leg chronic lymphedema. Right lower leg has erythema,  swelling and foul smelling discharge.   Neurology: AAO ×3.  Cranial nerves II - XII normal.  Normal muscle strength in all four extremities.     Medical Decision Making       70 MINUTES SPENT BY ME on the date of service doing chart review, history, exam, documentation & further activities per the note.      Data     I have personally reviewed the following data over the past 24 hrs:    17.1 (H)  \   10.1 (L)   / 477 (H)     137 102 19.2 /  96   4.5 23 0.95 \     ALT: 19 AST: 21 AP: 161 (H) TBILI: 0.2   ALB: 3.2 (L) TOT PROTEIN: 6.9 LIPASE: N/A     Trop: 187 (HH) BNP: 224     Procal: N/A CRP: 83.80 (H) Lactic Acid: 1.5         Imaging results reviewed over the past 24 hrs:   Recent Results (from the past 24 hours)   CT Chest Pulmonary Embolism w Contrast    Narrative    EXAM: CT CHEST PULMONARY EMBOLISM W CONTRAST  LOCATION: [facility]  DATE: 1/12/2025    INDICATION: hyopxia, sepsis  COMPARISON: CT chest 9/7/2024  TECHNIQUE: CT chest pulmonary angiogram during arterial phase injection of IV contrast. Multiplanar reformats and MIP reconstructions were performed. Dose reduction techniques were used.   CONTRAST: [id number] 90ml    FINDINGS:  ANGIOGRAM CHEST: Pulmonary arteries are normal caliber and negative for pulmonary emboli. Thoracic aorta is normal caliber and negative for dissection. No CT evidence of right heart strain.    LUNGS AND PLEURA: No consolidation or pleural effusion. Mild dependent atelectasis.    MEDIASTINUM/AXILLAE: Normal heart size. No pericardial effusion. Enlarged left supraclavicular lymph node measuring 1.3 cm short axis (series 4 image 24), stable compared to 9/7/2024. Anterior mediastinal lymph node measuring 8 mm short axis also stable.   Moderate hiatal hernia.    CORONARY ARTERY CALCIFICATION: None.    UPPER ABDOMEN: Mildly distended gallbladder with normal wall thickness partially visualized. Colonic diverticulosis.    MUSCULOSKELETAL: Spondylosis.       Impression    IMPRESSION:  1.  No evidence of pulmonary embolism.  2.  No consolidation or pleural effusion.  3.  Moderate hiatal hernia.

## 2025-01-12 NOTE — ED TRIAGE NOTES
Patient to ER via [facility]. Patient has long history of cellulitis at right lower extremitiy. Drsg changes every other day from nurse. Last drsg balderas yesterday, per self. Patient normally uses walker to ambulate, lives in own home with family. Called 911 for increasing pain at right leg, increasing swelling and inability to ambulate because of pain. Has had sepsis in past from this leg.    On arrival, alert and orient, temp at 103.0 right leg has dressing that is intact, was not removed

## 2025-01-12 NOTE — ED PROVIDER NOTES
EMERGENCY DEPARTMENT ENCOUNTER      NAME: Elizabeth Kelley  AGE: 79 year old female  YOB: 1945  MRN: [id number]  EVALUATION DATE & TIME: 1/12/2025 10:20 AM    PCP: Yung Herrmann    ED PROVIDER: Nani Allan PA-C      Chief Complaint   Patient presents with    cellulitis         FINAL IMPRESSION:  1. Acute sepsis (H)    2. Cellulitis of right lower leg    3. Chronic ulcer of right leg (H)    4. Elevated troponin    5. Hypoxia          ED COURSE & MEDICAL DECISION MAKING:    10:26 AM Met with patient for initial interview. Plan for care discussed.  10:34 AM Staffed patient with Dr. Francois.   10:48 AM Reevaluated and updated patient. DP and PT pulses confirmed with Doppler.   1:52 PM Spoke with lab regarding elevated troponin at 149. Attempted to reevaluate patient, patient gone to imaging. Spoke with patient's  who confirms 5-year history of right leg ulceration.  2:07 PM RN updated patient back from imaging and denies any chest pain, shortness of breath, palpitations, lightheadedness, or other ACS equivalent.  3:08 PM CT reviewed and re-discussed case with Dr. Francois. Plan for cardiology consult and admission.  3:29 PM Dr. Francois updated she had talked with cardiology and believe elevated troponin likely secondary to demand in setting of sepsis/tachycardia. Will hold on heparin or other cardiac intervention at this time.  3:42 PM Spoke with hospitalist, Dr. Weinberg, who kindly accepts the admission.    79 year old female with a history of chronic right lower extremity ulceration, lymphedema, venous hypertension lower extremities, multiple sclerosis, HTN, HLD, obesity presents to the Emergency Department for evaluation of increased right lower leg pain, fever, and malaise since Friday. She reports recently getting over about a week of Norovirus. She reports diarrhea has since resolved and denies any abdominal pain. No chest pain, shortness of breath, URI-like symptoms, cough, urinary symptoms.   Per chart review, patient was admitted 9//24 in the setting of sepsis and cellulitis of the right lower extremity.  Lower extremity venous ultrasound was negative for deep venous thrombosis.  ID service is assisting with antibiotic therapy. Broad-spectrum IV vancomycin and Zosyn was started, wound therapy was consulted, subsequent wound culture showed Pseudomonas, MSSA, Proteus,stenotrophomonas , antibiotics changed by ID to IV meropenem for 7 more days on discharge plus p.o. minocycline for 10 days.  No cultures to date on blood cultures. She reports symptoms feel similar to this episode requiring admission.  Upon exam, patient is febrile, mildly tachycardic at 103, and appears uncomfortable. RRR and lungs clear to auscultation bilaterally. Abdomen soft and non-tender without guarding or rebound. Right lower extremity edematous with generalized ulceration with purulent drainage as pictured below. Neurovascularly intact, pulses confirmed with Doppler US. Patient with intermittent hypoxia on finger oximeter, moved to patient's forehead with similar hypoxia. Patient placed on 2L O2 NC with improvement. Differential diagnosis includes but not limited to sepsis, cellulitis, UTI, pneumonia, pneumothorax, pleural effusion, pulmonary edema, PE, cardiac arrhythmia, ACS, CHF exacerbation, COVID, influenza, RSV, electrolyte abnormality, anemia, dehydration. Based on patient's presenting symptoms, laboratories and imaging were ordered. Patient was treated with Tylenol, Fentanyl, and IV NS 15 mL/kg upon arrival. Given patient meeting sepsis criteria with suspected soft tissue infection, Rocephin and Vancomycin ordered.     Lactate WNL. CBC with leukocytosis at 17.1. CMP at baseline. ESR elevated 74. CRP elevated at 83.8. BNP WNL. EKG without ST elevation. Initial troponin 91, repeat 149. Discussed case with cardiology who believes secondary to demand in setting of sepsis/tachycardia. Will continue to trend, no recommendation  for Heparin at this time. COVID/influenza/RSV negative. UA without evidence of infection. Blood cultures pending.    Symptoms and workup most consistent with sepsis in setting of likely right lower extremity cellulitis. Patient was made aware of the above findings. I spoke with Dr. Rueda, hospitalist, who kindly accepts the admission. The patient was stable throughout ER visit and upon transfer to the floor.    Medical Decision Making  Obtained supplemental history:Supplemental history obtained?: No  Reviewed external records: External records reviewed?: Documented in chart  Care impacted by chronic illness:Documented in Chart  Did you consider but not order tests?: Work up considered but not performed and documented in chart, if applicable  Did you interpret images independently?: Independent interpretation of ECG and images noted in documentation, when applicable.  Consultation discussion with other provider:Did you involve another provider (consultant, MH, pharmacy, etc.)?: I discussed the care with another health care provider, see documentation for details.  Admit.    MIPS: CT Pulmonary Angiogram:Patient is moderate to high risk for PE.      MEDICATIONS GIVEN IN THE EMERGENCY:  Medications   sodium chloride 0.9% BOLUS 1,674 mL (1,674 mLs Intravenous $New Bag 1/12/25 1539)   acetaminophen (TYLENOL) tablet 1,000 mg (1,000 mg Oral $Given 1/12/25 1050)   sodium chloride 0.9% BOLUS 717 mL (0 mLs Intravenous Stopped 1/12/25 1206)   fentaNYL (PF) (SUBLIMAZE) injection 50 mcg (50 mcg Intravenous $Given 1/12/25 1105)   cefTRIAXone (ROCEPHIN) 2 g vial to attach to  ml bag for ADULTS or NS 50 ml bag for PEDS (0 g Intravenous Stopped 1/12/25 1134)   vancomycin (VANCOCIN) 2,000 mg in 0.9% NaCl 520 mL intermittent infusion (0 mg Intravenous Stopped 1/12/25 1346)   HYDROmorphone (PF) (DILAUDID) injection 0.3 mg (0.3 mg Intravenous $Given 1/12/25 1318)   iopamidol (ISOVUE-370) solution 90 mL (90 mLs Intravenous $Given  1/12/25 1411)       NEW PRESCRIPTIONS STARTED AT TODAY'S ER VISIT  New Prescriptions    No medications on file          =================================================================    HPI    Patient information was obtained from: patient    Use of : N/A       Elizabeth Kelley is a 79 year old female with a pertinent history of chronic right lower extremity ulceration, lymphedema, venous hypertension lower extremities, multiple sclerosis, HTN, HLD, obesity presents to the Emergency Department for evaluation of increased right lower leg pain, fever, and malaise since Friday. She reports recently getting over about a week of Norovirus. She reports diarrhea has since resolved and denies any abdominal pain. No chest pain, shortness of breath, URI-like symptoms, cough, urinary symptoms.  Per chart review, patient was admitted 9//24 in the setting of sepsis and cellulitis of the right lower extremity.  Lower extremity venous ultrasound was negative for deep venous thrombosis.  ID service is assisting with antibiotic therapy. Broad-spectrum IV vancomycin and Zosyn was started, wound therapy was consulted, subsequent wound culture showed Pseudomonas, MSSA, Proteus,stenotrophomonas , antibiotics changed by ID to IV meropenem for 7 more days on discharge plus p.o. minocycline for 10 days.  No cultures to date on blood cultures. She reports symptoms feel similar to this episode requiring admission.  Patient states that she has been dealing with norovirus that started last Saturday, but her symptoms had since resolved a few days ago.  She reports that she did have some fevers during that time that had resolved.  However, on Friday she had developed recurrent fevers, chills, and increased right lower extremity pain.  She states that symptoms feel exactly the same to when she had sepsis with cellulitis in the past.  She states that she has been requiring admission in the past with IV antibiotics.  She reports she  has not been on any antibiotics since her last admission.  She states she follows with vascular surgery and wound clinic quite often.  She sees Dr. Torres's and monthly and has a wound care nurse change her dressings at least 3 times per week.  She otherwise denies any chest pain, shortness of breath, URI-like symptoms, cough, abdominal pain, ongoing diarrhea, vomiting, nausea.  She denies any known exposures to COVID, flu, RSV, or other illness.  She reports that her  was similarly ill with norovirus, but his symptoms is also resolved.    REVIEW OF SYSTEMS   Review of Systems See HPI    PAST MEDICAL HISTORY:  Past Medical History:   Diagnosis Date    Ankle contracture, left     Class 3 severe obesity due to excess calories without serious comorbidity with body mass index (BMI) of 45.0 to 49.9 in adult (H)     Combined gastric and duodenal ulcer     Controlled substance agreement broken     Depression     Dyslipidemia     Edema     Edema     Gait abnormality     GERD (gastroesophageal reflux disease)     Gout     Lymphedema of both lower extremities     Melanoma (H)     left upper arm    MS (multiple sclerosis) (H)     RLS (restless legs syndrome)     Skin ulcer of left lower leg (H)     Valgus deformity of both feet     Vitamin D deficiency        PAST SURGICAL HISTORY:  Past Surgical History:   Procedure Laterality Date    ABLATION SAPHENOUS VEIN W/ RFA Right 04/12/2021    Saphenous vein, anterior accessory saphenous vein, sclerotherapy of multiple veins.    COSMETIC SURGERY  1988    reduction of excess skin from weight loss    ESOPHAGOSCOPY, GASTROSCOPY, DUODENOSCOPY (EGD), COMBINED N/A 03/30/2015    Procedure: UPPER ENDOSCOPY with gastric biopsy;  Surgeon: Checo Fletcher MD;  Location: [facility];  Service:     IRRIGATION AND DEBRIDEMENT LOWER EXTREMITY, COMBINED Right 3/21/2022    Procedure: RIGHT LEG WOUND DEBRIDEMENT, PAULIE DERMATONE, MISONIX, VAC VERA FLOW WITH VASHE;  Surgeon: Ara  Gabriele LARIOS MD;  Location: [facility] OR    IRRIGATION AND DEBRIDEMENT LOWER EXTREMITY, COMBINED Right 3/28/2022    Procedure: DEBRIDEMENT AND WOUND DRESSING CHANGE AND MYRIAD APPLICATION OF RIGHT LOWER LEG WOUND;  Surgeon: Gabriele Bullock MD;  Location: [facility] OR    MAMMOPLASTY REDUCTION Bilateral     MOHS MICROGRAPHIC PROCEDURE      left upper arm, melanoma    PICC DOUBLE LUMEN PLACEMENT  7/21/2024    PICC SINGLE LUMEN PLACEMENT  8/13/2021         PICC SINGLE LUMEN PLACEMENT  9/2/2021         PICC SINGLE LUMEN PLACEMENT  9/8/2024    SPINE SURGERY      L5 area surgery, decompression           CURRENT MEDICATIONS:    acetaminophen (TYLENOL) 500 MG tablet  ascorbic acid (VITAMIN C) 250 MG CHEW chewable tablet  aspirin (ASPIRIN 81) 81 MG chewable tablet  buPROPion (WELLBUTRIN SR) 150 MG 12 hr tablet  Carboxymethylcellulose Sodium 0.25 % SOLN  citalopram (CELEXA) 40 MG tablet  ferrous sulfate (FEROSUL) 325 (65 Fe) MG tablet  furosemide (LASIX) 20 MG tablet  gabapentin (NEURONTIN) 100 MG capsule  magnesium oxide (MAG-OX) 400 MG tablet  multivitamin w/minerals (CENTRUM ADULTS) tablet  naproxen (NAPROSYN) 500 MG tablet  nitroGLYcerin (NITROSTAT) 0.4 MG sublingual tablet  nystatin (MYCOSTATIN) 570724 UNIT/GM external powder  oxyCODONE (ROXICODONE) 5 MG tablet  pantoprazole (PROTONIX) 40 MG EC tablet  potassium chloride ER (K-TAB) 20 MEQ CR tablet  rOPINIRole (REQUIP) 1 MG tablet  rOPINIRole (REQUIP) 1 MG tablet  rOPINIRole (REQUIP) 1 MG tablet  simvastatin (ZOCOR) 40 MG tablet  spironolactone (ALDACTONE) 25 MG tablet  vitamin B-Complex  vitamin D3 (CHOLECALCIFEROL) 250 mcg (65679 units) capsule  zinc 50 MG TABS  Wound Cleansers (VASHE WOUND THERAPY EX)        ALLERGIES:  Allergies   Allergen Reactions    Other Environmental Allergy Anaphylaxis     Bees/Wasps Stings  Bees/Wasps Stings    Adhesive Tape Other (See Comments)     Red,itchy and oozes  Red,itchy and oozes    Adhesive [Cyanoacrylate] Unknown     Other reaction(s): sores     Latex Hives    Ragweeds Itching    Oxycodone-Acetaminophen Rash    Vicodin [Hydrocodone-Acetaminophen] Nausea and Vomiting and Hives       FAMILY HISTORY:  Family History   Problem Relation Age of Onset    Uterine Cancer Mother     Hyperlipidemia Mother     Hypertension Mother     Multiple Sclerosis Mother     Asthma Sister     Obesity Sister     Hyperlipidemia Sister     Hypertension Sister     Multiple Sclerosis Sister     Arthritis Sister     Colon Cancer Paternal Aunt     Breast Cancer Paternal Aunt     Hypertension Paternal Aunt     Hyperlipidemia Paternal Aunt     Brain Cancer Father     Arthritis Maternal Uncle     Arthritis Maternal Grandmother     Early Death Maternal Grandfather     Arthritis Paternal Grandmother     Arthritis Paternal Grandfather        SOCIAL HISTORY:   Social History     Socioeconomic History    Marital status: Single   Tobacco Use    Smoking status: Former     Current packs/day: 0.00     Average packs/day: 0.3 packs/day for 12.0 years (3.0 ttl pk-yrs)     Types: Cigarettes     Start date: 1963     Quit date: 1975     Years since quittin.1    Smokeless tobacco: Never    Tobacco comments:     quit more than 30 years ago   Substance and Sexual Activity    Alcohol use: Yes     Alcohol/week: 1.0 standard drink of alcohol     Types: 1 Standard drinks or equivalent per week     Comment: glass of wine once a week    Drug use: No    Sexual activity: Yes     Partners: Male     Birth control/protection: Post-menopausal   Social History Narrative    .   Has significant other.  2 grown children.  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       Social Drivers of Health     Financial Resource Strain: Low Risk  (2024)    Financial Resource Strain     Within the past 12 months, have you or your family members you live with been unable to get utilities (heat, electricity) when it was really needed?: No   Food Insecurity: Low Risk  (2024)    Food Insecurity     Within the  "past 12 months, did you worry that your food would run out before you got money to buy more?: No     Within the past 12 months, did the food you bought just not last and you didn t have money to get more?: No   Transportation Needs: Low Risk  (9/6/2024)    Transportation Needs     Within the past 12 months, has lack of transportation kept you from medical appointments, getting your medicines, non-medical meetings or appointments, work, or from getting things that you need?: No   Interpersonal Safety: Low Risk  (12/17/2024)    Interpersonal Safety     Do you feel physically and emotionally safe where you currently live?: Yes     Within the past 12 months, have you been hit, slapped, kicked or otherwise physically hurt by someone?: No     Within the past 12 months, have you been humiliated or emotionally abused in other ways by your partner or ex-partner?: No   Housing Stability: Low Risk  (9/6/2024)    Housing Stability     Do you have housing? : Yes     Are you worried about losing your housing?: No       VITALS:  /58   Pulse 94   Temp 99.6  F (37.6  C) (Oral)   Resp 18   Ht 1.549 m (5' 1\")   Wt 111.6 kg (246 lb)   SpO2 99%   BMI 46.48 kg/m      PHYSICAL EXAM    Constitutional:  Alert, appears uncomfortable. Cooperative.  EYES: Conjunctivae clear.   HENT:  Atraumatic, normocephalic.  Respiratory:  Respirations even, unlabored, in no acute respiratory distress. Lungs clear to auscultation bilaterally without wheeze, rhonchi, or rales. No cough. Speaks in full sentences easily.  Cardiovascular:  Tachycardic rate, regular rhythm, good peripheral perfusion.   GI: Soft, flat, non-distended. Bowel sounds normal. No abdominal tenderness to palpation. No guarding, rebound, or other peritoneal signs.  Musculoskeletal:  Right lower extremity: significant tenderness to palpation with generalized ulceration right lower leg as pictured below. No obvious signs of necrosis or crepitus. ROM limited secondary to pain. " Neurovascularly intact distally. Cap refill <2 seconds. 2+ DP and PT pulses confirmed with Doppler US. Compartments appear soft, but also difficult to assess as significant pain.  Integument: Warm, Dry.   Neurologic:  Alert & oriented. No focal deficits noted.   Psych: Normal mood and affect.          LAB:  All pertinent labs reviewed and interpreted.  Results for orders placed or performed during the hospital encounter of 01/12/25   CT Chest Pulmonary Embolism w Contrast    Impression    IMPRESSION:  1.  No evidence of pulmonary embolism.  2.  No consolidation or pleural effusion.  3.  Moderate hiatal hernia.   Comprehensive metabolic panel   Result Value Ref Range    Sodium 137 135 - 145 mmol/L    Potassium 4.5 3.4 - 5.3 mmol/L    Carbon Dioxide (CO2) 23 22 - 29 mmol/L    Anion Gap 12 7 - 15 mmol/L    Urea Nitrogen 19.2 8.0 - 23.0 mg/dL    Creatinine 0.95 0.51 - 0.95 mg/dL    GFR Estimate 61 >60 mL/min/1.73m2    Calcium 9.4 8.8 - 10.4 mg/dL    Chloride 102 98 - 107 mmol/L    Glucose 96 70 - 99 mg/dL    Alkaline Phosphatase 161 (H) 40 - 150 U/L    AST 21 0 - 45 U/L    ALT 19 0 - 50 U/L    Protein Total 6.9 6.4 - 8.3 g/dL    Albumin 3.2 (L) 3.5 - 5.2 g/dL    Bilirubin Total 0.2 <=1.2 mg/dL   Result Value Ref Range    Troponin T, High Sensitivity 91 (H) <=14 ng/L   Lactic acid whole blood with 1x repeat in 2 hr when >2   Result Value Ref Range    Lactic Acid, Initial 1.5 0.7 - 2.0 mmol/L   Erythrocyte sedimentation rate auto   Result Value Ref Range    Erythrocyte Sedimentation Rate 74 (H) 0 - 30 mm/hr   Result Value Ref Range    CRP Inflammation 83.80 (H) <5.00 mg/L   UA with Microscopic reflex to Culture    Specimen: Urine, Clean Catch   Result Value Ref Range    Color Urine Light Yellow Colorless, Straw, Light Yellow, Yellow    Appearance Urine Clear Clear    Glucose Urine Negative Negative mg/dL    Bilirubin Urine Negative Negative    Ketones Urine Negative Negative mg/dL    Specific Gravity Urine 1.025 1.001 -  1.030    Blood Urine Negative Negative    pH Urine 5.0 5.0 - 7.0    Protein Albumin Urine 20 (A) Negative mg/dL    Urobilinogen Urine <2.0 <2.0 mg/dL    Nitrite Urine Negative Negative    Leukocyte Esterase Urine Negative Negative    Bacteria Urine Few (A) None Seen /HPF    Mucus Urine Present (A) None Seen /LPF    RBC Urine <1 <=2 /HPF    WBC Urine 2 <=5 /HPF    Squamous Epithelials Urine 1 <=1 /HPF   Influenza A/B, RSV and SARS-CoV2 PCR (COVID-19) Nasopharyngeal    Specimen: Nasopharyngeal; Swab   Result Value Ref Range    Influenza A PCR Negative Negative    Influenza B PCR Negative Negative    RSV PCR Negative Negative    SARS CoV2 PCR Negative Negative   CBC with platelets and differential   Result Value Ref Range    WBC Count 17.1 (H) 4.0 - 11.0 10e3/uL    RBC Count 4.11 3.80 - 5.20 10e6/uL    Hemoglobin 10.1 (L) 11.7 - 15.7 g/dL    Hematocrit 33.4 (L) 35.0 - 47.0 %    MCV 81 78 - 100 fL    MCH 24.6 (L) 26.5 - 33.0 pg    MCHC 30.2 (L) 31.5 - 36.5 g/dL    RDW 16.4 (H) 10.0 - 15.0 %    Platelet Count 477 (H) 150 - 450 10e3/uL    % Neutrophils 92 %    % Lymphocytes 3 %    % Monocytes 4 %    % Eosinophils 0 %    % Basophils 0 %    % Immature Granulocytes 1 %    NRBCs per 100 WBC 0 <1 /100    Absolute Neutrophils 15.7 (H) 1.6 - 8.3 10e3/uL    Absolute Lymphocytes 0.4 (L) 0.8 - 5.3 10e3/uL    Absolute Monocytes 0.7 0.0 - 1.3 10e3/uL    Absolute Eosinophils 0.1 0.0 - 0.7 10e3/uL    Absolute Basophils 0.0 0.0 - 0.2 10e3/uL    Absolute Immature Granulocytes 0.1 <=0.4 10e3/uL    Absolute NRBCs 0.0 10e3/uL   Result Value Ref Range    Troponin T, High Sensitivity 149 (HH) <=14 ng/L   N terminal pro BNP outpatient   Result Value Ref Range    N Terminal Pro BNP Outpatient 224 0 - 1,800 pg/mL       RADIOLOGY:  Reviewed all pertinent imaging. Please see official radiology report.  CT Chest Pulmonary Embolism w Contrast   Final Result   IMPRESSION:   1.  No evidence of pulmonary embolism.   2.  No consolidation or pleural  effusion.   3.  Moderate hiatal hernia.          EKG:    Performed at: 1/12/25  Impression: NSR, nonspecific ST abnormality  Rate: 108  Rhythm: sinus tachycardia  Axis: no deviation  OK Interval: 132  QRS Interval: 80  QTc Interval: 426  ST Changes: nonspecific ST abnormality  Comparison: 9/7/24, T wave amplitude has decreased in lateral leads    Dr. Francois and I have independently reviewed and interpreted the EKG(s) documented above.    [facility] Document[facility]:   CMS Diagnoses: IV Antibiotics given and/or elevated Lactate of 1.5 and no sepsis note found - Delete this reminder and enter the sepsis note or '.edcms' before signing chart.>>>The patient has signs of sepsis   Sepsis ED evaluation   The patient has signs of sepsis as evidenced by:  1. Presence of 2 SIRS criteria, suspected infection, AND  2. Organ dysfunction:  febrile, tachycardia, suspected RLE infection, eventual need for oxygen    Time zero:  10:30 AM  on 01/12/25 as this was the time when Provider determined that sepsis was present.    Lactic Acid Results:  Recent Labs   Lab Test 01/12/25  1104 09/04/24  1047 07/16/24  1358   LACT 1.5 1.3 1.2       3 Hour Bundle 6 Hour Bundle (Reassessment)   Blood Cultures before IV Antibiotics: No, antibiotics were started prior to BCx collection b/c waiting for BCx to be collected would have been detrimental to the patient; did not order until after antibiotics were given  Antibiotics given: see below  Prehospital fluid volume (mL):                     Total fluids given (ED +Pre-hospital):  The patient responded to a lesser volume of IV fluids. The initial volume ordered was 15 mL/kg as intermittent hypoxia, did not want to fluid over-load.  mL.    Repeat Lactic Acid Level: Ordered by reflex for 2 hours after initial lactic acid collection.  Vasopressors: MAP>65 after initial IVF bolus, will continue to monitor fluid status and vital signs.  Repeat perfusion exam: I attest to having performed a  repeat sepsis exam and assessment of perfusion at 3:42 PM .   BMI Readings from Last 1 Encounters:   01/12/25 46.48 kg/m        Anti-infectives (From admission through now)      Start     Dose/Rate Route Frequency Ordered Stop    01/12/25 1130  vancomycin (VANCOCIN) 2,000 mg in 0.9% NaCl 520 mL intermittent infusion         2,000 mg  over 2 Hours Intravenous ONCE 01/12/25 1102 01/12/25 1346    01/12/25 1100  cefTRIAXone (ROCEPHIN) 2 g vial to attach to  ml bag for ADULTS or NS 50 ml bag for PEDS         2 g  over 30 Minutes Intravenous ONCE 01/12/25 1055 01/12/25 1134                   Nani Allan PA-C  [facility] EMERGENCY DEPARTMENT  [facility] [facility] [id number] [phone]      Nani Allan PA-C  01/12/25 9551

## 2025-01-12 NOTE — PHARMACY-VANCOMYCIN DOSING SERVICE
Pharmacy Vancomycin Initial Note  Date of Service 2025  Patient's  1945  79 year old, female    Indication: Sepsis and Skin and Soft Tissue Infection    Current estimated CrCl = Estimated Creatinine Clearance: 51 mL/min (A) (based on SCr of 1.07 mg/dL (H)).    Creatinine for last 3 days  No results found for requested labs within last 3 days.    Recent Vancomycin Level(s) for last 3 days  No results found for requested labs within last 3 days.      Vancomycin IV Administrations (past 72 hours)        No vancomycin orders with administrations in past 72 hours.                    Nephrotoxins and other renal medications (From now, onward)      Start     Dose/Rate Route Frequency Ordered Stop    25 1130  vancomycin (VANCOCIN) 2,000 mg in 0.9% NaCl 520 mL intermittent infusion         2,000 mg  over 2 Hours Intravenous ONCE 25 1102              Contrast Orders - past 72 hours (72h ago, onward)      None                Plan:  Vancomycin  2000 mg IV x 1 dose.  Please re consult pharmacy if vancomycin is to be continued on admission.      Fani Wells RP

## 2025-01-12 NOTE — ED PROVIDER NOTES
Emergency Department Midlevel Supervisory Note     I had a face to face encounter with this patient seen by the Advanced Practice Provider (MILO). I personally made/approved the management plan and take responsibility for the patient management. I personally saw patient and performed a substantive portion of the visit including all aspects of the medical decision making.     ED Course:  10:35 AM Nani Allan PA-C staffed patient with me. I agree with their assessment and plan of management, and I will see the patient.  10:37 AM I met with the patient to introduce myself, gather additional history, perform my initial exam, and discuss the plan.   3:21 PM I spoke with Dr. Howard Edmondson, cardiology.   3:40 PM Nani Allan PA-C, spoke with Dr. Weinberg (hospitalist team), who accepts the patient for admission.     Brief HPI:     Elizabeth Kelley is a 79 year old female who presents for evaluation of cellulitis.    The patient has extensive history of cellulitis of the right lower extremity. She receives dressing changes every other day, with her most recent dressing change being yesterday (1/11/2025). She typically uses a walker to ambulate and lives at home. She presents today (1/12/2025) for increasing pain in the right lower extremity, worsening swelling and difficulty ambulating due to the pain. She has had sepsis in the past due to cellulitis of this leg.    Per Chart Review, the patient was admitted to [facility] from 9/4/2024-9/11/2020 with sepsis related to right lower extremity cellulitis. A lower extremity venous ultrasound was negative for DVT. Infectious diseases assisted with antibiotic therapy. Broad-spectrum IV vancomycin and Zosyn were started, wound therapy was consulted and subsequent wound cultures showed Pseudomonas, MRSA, and Proteus stenotrophomonas.She was transitioned to IV meropenem for seven more days on discharge in addition to p.o. minocycline for ten days. She was seen by wound care and  " for discharge home with IV antibiotics services and infusion care. Planned for wound care at home as well.    I, Michelle Geno, am serving as a scribe to document services personally performed by Natalya Francois M.D., based on my observations and the provider's statements to me.   I, Natalya Francois M.D. attest that Michelle Lora was acting in a scribe capacity, has observed my performance of the services and has documented them in accordance with my direction.    Brief Physical Exam: /58   Pulse 94   Temp 99.6  F (37.6  C) (Oral)   Resp 18   Ht 1.549 m (5' 1\")   Wt 111.6 kg (246 lb)   SpO2 99%   BMI 46.48 kg/m    Constitutional:  Alert, resting in bed, nontoxic appearing  EYES: Conjunctivae clear  HENT:  Atraumatic  Respiratory:  Normal work of breathing, lungs CTAB  Cardiovascular:  Regular rate and rhythm, dopplerable DP and PT pulse on the right leg  GI: Soft, non-distended, non-tender  Musculoskeletal:  Moves all 4 extremities  Integument: Significant wound to the RLE, see picture, no crepitance  Neurologic:  Alert & oriented, speech clear and fluent, no focal deficits noted         MDM:   80 y/o F who presents with cellulitis concern. Reported increased right leg pain and generally feeling unwell and reports feeling similar to when she has had sepsis from cellulitis in the past.     She has a significant chronic wound to her right lower extremity as shown in the picture above.  There is no crepitance or rapid progression to suggest necrotizing infection.  Exam also less suggestive of abscess.  She was found to be febrile here with leukocytosis and there was concern for sepsis.  She was started on antibiotics.  Initial lactic acid was normal.  She was noted to be hypoxic here, given this we started with 15 cc/kg and then added additional fluids to complete the 30 cc/kg after she tolerated this well without any worsening hypoxia or respiratory status. BNP is WNL do not suspect CHF. " Viral swabs negative. CT PE scan without acute findings.  Initial troponin was 91 which did not jump to 149 on repeat.  No significant ST changes on her EKG and she has no chest pain at all.  I suspect this is demand in the setting of sepsis and hypoxia but I did discuss with cardiology who agrees and they do not feel she needs heparin or further emergent management at this time. I suspect she may have a viral infection as well as a possible sepsis secondary to infection of this chronic wound to her right lower extremity. She was admitted to the hospitalist for further management.    1. Acute sepsis (H)    2. Cellulitis of right lower leg    3. Chronic ulcer of right leg (H)    4. Elevated troponin    5. Hypoxia      Labs and Imaging:  Results for orders placed or performed during the hospital encounter of 01/12/25   CT Chest Pulmonary Embolism w Contrast    Impression    IMPRESSION:  1.  No evidence of pulmonary embolism.  2.  No consolidation or pleural effusion.  3.  Moderate hiatal hernia.   Comprehensive metabolic panel   Result Value Ref Range    Sodium 137 135 - 145 mmol/L    Potassium 4.5 3.4 - 5.3 mmol/L    Carbon Dioxide (CO2) 23 22 - 29 mmol/L    Anion Gap 12 7 - 15 mmol/L    Urea Nitrogen 19.2 8.0 - 23.0 mg/dL    Creatinine 0.95 0.51 - 0.95 mg/dL    GFR Estimate 61 >60 mL/min/1.73m2    Calcium 9.4 8.8 - 10.4 mg/dL    Chloride 102 98 - 107 mmol/L    Glucose 96 70 - 99 mg/dL    Alkaline Phosphatase 161 (H) 40 - 150 U/L    AST 21 0 - 45 U/L    ALT 19 0 - 50 U/L    Protein Total 6.9 6.4 - 8.3 g/dL    Albumin 3.2 (L) 3.5 - 5.2 g/dL    Bilirubin Total 0.2 <=1.2 mg/dL   Result Value Ref Range    Troponin T, High Sensitivity 91 (H) <=14 ng/L   Lactic acid whole blood with 1x repeat in 2 hr when >2   Result Value Ref Range    Lactic Acid, Initial 1.5 0.7 - 2.0 mmol/L   Erythrocyte sedimentation rate auto   Result Value Ref Range    Erythrocyte Sedimentation Rate 74 (H) 0 - 30 mm/hr   Result Value Ref Range    CRP  Inflammation 83.80 (H) <5.00 mg/L   UA with Microscopic reflex to Culture    Specimen: Urine, Clean Catch   Result Value Ref Range    Color Urine Light Yellow Colorless, Straw, Light Yellow, Yellow    Appearance Urine Clear Clear    Glucose Urine Negative Negative mg/dL    Bilirubin Urine Negative Negative    Ketones Urine Negative Negative mg/dL    Specific Gravity Urine 1.025 1.001 - 1.030    Blood Urine Negative Negative    pH Urine 5.0 5.0 - 7.0    Protein Albumin Urine 20 (A) Negative mg/dL    Urobilinogen Urine <2.0 <2.0 mg/dL    Nitrite Urine Negative Negative    Leukocyte Esterase Urine Negative Negative    Bacteria Urine Few (A) None Seen /HPF    Mucus Urine Present (A) None Seen /LPF    RBC Urine <1 <=2 /HPF    WBC Urine 2 <=5 /HPF    Squamous Epithelials Urine 1 <=1 /HPF   Influenza A/B, RSV and SARS-CoV2 PCR (COVID-19) Nasopharyngeal    Specimen: Nasopharyngeal; Swab   Result Value Ref Range    Influenza A PCR Negative Negative    Influenza B PCR Negative Negative    RSV PCR Negative Negative    SARS CoV2 PCR Negative Negative   CBC with platelets and differential   Result Value Ref Range    WBC Count 17.1 (H) 4.0 - 11.0 10e3/uL    RBC Count 4.11 3.80 - 5.20 10e6/uL    Hemoglobin 10.1 (L) 11.7 - 15.7 g/dL    Hematocrit 33.4 (L) 35.0 - 47.0 %    MCV 81 78 - 100 fL    MCH 24.6 (L) 26.5 - 33.0 pg    MCHC 30.2 (L) 31.5 - 36.5 g/dL    RDW 16.4 (H) 10.0 - 15.0 %    Platelet Count 477 (H) 150 - 450 10e3/uL    % Neutrophils 92 %    % Lymphocytes 3 %    % Monocytes 4 %    % Eosinophils 0 %    % Basophils 0 %    % Immature Granulocytes 1 %    NRBCs per 100 WBC 0 <1 /100    Absolute Neutrophils 15.7 (H) 1.6 - 8.3 10e3/uL    Absolute Lymphocytes 0.4 (L) 0.8 - 5.3 10e3/uL    Absolute Monocytes 0.7 0.0 - 1.3 10e3/uL    Absolute Eosinophils 0.1 0.0 - 0.7 10e3/uL    Absolute Basophils 0.0 0.0 - 0.2 10e3/uL    Absolute Immature Granulocytes 0.1 <=0.4 10e3/uL    Absolute NRBCs 0.0 10e3/uL   Result Value Ref Range     Troponin T, High Sensitivity 149 (HH) <=14 ng/L   N terminal pro BNP outpatient   Result Value Ref Range    N Terminal Pro BNP Outpatient 224 0 - 1,800 pg/mL       I have reviewed the relevant laboratory studies above.    EKG: I reviewed and independently interpreted the patient's EKG, with comments made as listed below. Please see scanned EKG for full report.   Performed at: 10:44 AM,1/12/2025  Impression: Sinus tachycardia. Nonspecific ST abnormality. Abnormal ECG.  Rate: 108 BPM   Rhythm: Sinus tachycardia.  QRS Interval: 80 ms  QTc Interval: 426 ms  Comparison:  When compared with EKG of 9/7/2024, T wave amplitude has decreased in lateral leads.     I have independently reviewed and interpreted the EKG(s) documented above.    Procedures:  I was present for the key portions of procedures documented in MILO/midlevel note, see midlevel note for further details.    Natalya Francois M.D.  [facility] EMERGENCY DEPARTMENT  [address] [place] [id number] [phone]       Natalya Francois MD  01/12/25 3664

## 2025-01-12 NOTE — PHARMACY-VANCOMYCIN DOSING SERVICE
Pharmacy Vancomycin Initial Note  Date of Service 2025  Patient's  1945  79 year old, female    Indication: Skin and Soft Tissue Infection    Current estimated CrCl = Estimated Creatinine Clearance: 57.4 mL/min (based on SCr of 0.95 mg/dL).    Creatinine for last 3 days  2025: 11:04 AM Creatinine 0.95 mg/dL    Recent Vancomycin Level(s) for last 3 days  No results found for requested labs within last 3 days.      Vancomycin IV Administrations (past 72 hours)                     vancomycin (VANCOCIN) 2,000 mg in 0.9% NaCl 520 mL intermittent infusion (mg) 2,000 mg New Bag 25 1134                    Nephrotoxins and other renal medications (From now, onward)      Start     Dose/Rate Route Frequency Ordered Stop    25 1700  furosemide (LASIX) tablet 20 mg        Note to Pharmacy: PTA Sig:Take 20 mg by mouth daily.      20 mg Oral DAILY 25 1632              Contrast Orders - past 72 hours (72h ago, onward)      Start     Dose/Rate Route Frequency Stop    25 1430  iopamidol (ISOVUE-370) solution 90 mL         90 mL Intravenous ONCE 25 1411            [employer] Prediction of Planned Initial Vancomycin Regimen  Loading dose: N/A  Regimen: 1000 mg IV every 24 hours.  Start time: 11:34 on 2025  Exposure target: AUC24 (range)400-600 mg/L.hr   AUC24,ss: 461 mg/L.hr  Probability of AUC24 > 400: 61 %  Ctrough,ss: 12.6 mg/L  Probability of Ctrough,ss > 20: 27 %  Probability of nephrotoxicity (Lodise LOUIS ): 8 %        Plan:  Patient received vancomycin 2000 mg IV x 1 in the ED. Will continue with vancomycin 1000 mg IV q24h (9 mg/kg).   Vancomycin monitoring method: AUC  Vancomycin therapeutic monitoring goal: 400-600 mg*h/L  Pharmacy will check vancomycin levels as appropriate in 1-3 Days.    Serum creatinine levels will be ordered daily for the first week of therapy and at least twice weekly for subsequent weeks.      Patricia Mann, PharmD, BCCP 2025 5:42  PM

## 2025-01-12 NOTE — ED NOTES
Bed: [facility]  Expected date:   Expected time:   Means of arrival:   Comments:  [id number], history of sepsis, cellulitis on leg

## 2025-01-12 NOTE — PHARMACY-ADMISSION MEDICATION HISTORY
Pharmacist Admission Medication History    Admission medication history is complete. The information provided in this note is only as accurate as the sources available at the time of the update.    Information Source(s): Patient via in-person    Pertinent Information: Oxycodone last filled 11/15/24 for 6 tablets. Patient states she still has some left.     Changes made to PTA medication list:  Added: nystatin powder  Deleted: acetaminophen 500 mg BID, bioflavonoid products, lidocaine ointment, oxycodone  Changed: acetaminophen 1000 mg daily to PRN, ferrous sulfate three times weekly to every other day, furosemide 20 mg BID to once daily, potassium chloride daily to twice daily, vitamin B complex daily to three times weekly, zinc daily to three times weekly    Allergies reviewed with patient and updates made in EHR: yes    Medication History Completed By: Denys Ortiz RPH 1/12/2025 11:47 AM    PTA Med List   Medication Sig Last Dose/Taking    acetaminophen (TYLENOL) 500 MG tablet Take 1,000 mg by mouth daily as needed for pain. 1/11/2025 Evening    ascorbic acid (VITAMIN C) 250 MG CHEW chewable tablet Take 250 mg by mouth daily. 1/11/2025 Morning    aspirin (ASPIRIN 81) 81 MG chewable tablet Take 1 tablet by mouth every morning 1/11/2025 Morning    buPROPion (WELLBUTRIN SR) 150 MG 12 hr tablet Take 150 mg by mouth every morning  1/11/2025 Morning    Carboxymethylcellulose Sodium 0.25 % SOLN Apply 1 drop to eye daily as needed 1/11/2025 Evening    citalopram (CELEXA) 40 MG tablet Take 40 mg by mouth daily (with lunch) 1/11/2025 Morning    ferrous sulfate (FEROSUL) 325 (65 Fe) MG tablet Take 325 mg by mouth every other day. 1/10/2025 Morning    furosemide (LASIX) 20 MG tablet Take 20 mg by mouth daily. 1/11/2025 Morning    gabapentin (NEURONTIN) 100 MG capsule Take 100 mg by mouth 3 times daily 1/11/2025 Evening    magnesium oxide (MAG-OX) 400 MG tablet Take 1 tablet by mouth every morning 1/11/2025 Morning     multivitamin w/minerals (CENTRUM ADULTS) tablet Take 1 tablet by mouth every morning 1/11/2025 Morning    naproxen (NAPROSYN) 500 MG tablet TAKE 1 TABLET BY MOUTH EVERY 12 HOURS WITH FOOD OR MILK AS NEEDED FOR 7 DAYS 1/11/2025 Noon    nitroGLYcerin (NITROSTAT) 0.4 MG sublingual tablet PLACE 1 TABLET UNDER TONGUE EVERY 5 MINTUES AS NEEDED FOR CHEST PAIN FOR UP TO 30 DAYS Unknown    oxyCODONE (ROXICODONE) 5 MG tablet Take 2.5-5 mg by mouth every 6 hours as needed for severe pain. More than a month    pantoprazole (PROTONIX) 40 MG EC tablet Take 40 mg by mouth every morning 1/11/2025 Morning    potassium chloride ER (K-TAB) 20 MEQ CR tablet Take 20 mEq by mouth 2 times daily. 1/11/2025 Morning    rOPINIRole (REQUIP) 1 MG tablet Take 0.5-1 mg by mouth daily as needed. At noon 1/11/2025 Noon    rOPINIRole (REQUIP) 1 MG tablet Take 0.5 mg by mouth every morning In addition, patient takes one tablet (1 mg) in the evening 1/11/2025 Morning    rOPINIRole (REQUIP) 1 MG tablet Take 1 mg by mouth every evening In addition, patient takes one half tablet (0.5 mg) in the morning 1/11/2025 Evening    simvastatin (ZOCOR) 40 MG tablet [SIMVASTATIN (ZOCOR) 40 MG TABLET] Take 40 mg by mouth bedtime. 1/11/2025 Evening    spironolactone (ALDACTONE) 25 MG tablet Take 25 mg by mouth every morning 1/11/2025 Morning    vitamin B-Complex Take 1 tablet by mouth daily. 1/10/2025 Morning    vitamin D3 (CHOLECALCIFEROL) 250 mcg (93690 units) capsule Take 1 capsule by mouth daily. 1/11/2025 Morning    zinc 50 MG TABS Take 1 tablet by mouth three times a week. On Tues, Thur, Sat 1/11/2025 Morning

## 2025-01-12 NOTE — ED NOTES
Pt right lower extremity red, weeping, bloody and draining greenish grey discharge.  Pt having severe pain on RLE.

## 2025-01-13 ENCOUNTER — APPOINTMENT (OUTPATIENT)
Dept: PHYSICAL THERAPY | Facility: HOSPITAL | Age: 80
DRG: 871 | End: 2025-01-13
Attending: INTERNAL MEDICINE
Payer: COMMERCIAL

## 2025-01-13 ENCOUNTER — APPOINTMENT (OUTPATIENT)
Dept: OCCUPATIONAL THERAPY | Facility: HOSPITAL | Age: 80
DRG: 871 | End: 2025-01-13
Attending: INTERNAL MEDICINE
Payer: COMMERCIAL

## 2025-01-13 ENCOUNTER — APPOINTMENT (OUTPATIENT)
Dept: CARDIOLOGY | Facility: HOSPITAL | Age: 80
DRG: 871 | End: 2025-01-13
Attending: INTERNAL MEDICINE
Payer: COMMERCIAL

## 2025-01-13 LAB
CREAT SERPL-MCNC: 1.05 MG/DL (ref 0.51–0.95)
EGFRCR SERPLBLD CKD-EPI 2021: 54 ML/MIN/1.73M2
LVEF ECHO: NORMAL
TROPONIN T SERPL HS-MCNC: 139 NG/L

## 2025-01-13 PROCEDURE — 250N000013 HC RX MED GY IP 250 OP 250 PS 637: Performed by: INTERNAL MEDICINE

## 2025-01-13 PROCEDURE — 250N000011 HC RX IP 250 OP 636: Performed by: STUDENT IN AN ORGANIZED HEALTH CARE EDUCATION/TRAINING PROGRAM

## 2025-01-13 PROCEDURE — 97161 PT EVAL LOW COMPLEX 20 MIN: CPT | Mod: GP | Performed by: PHYSICAL THERAPIST

## 2025-01-13 PROCEDURE — 36415 COLL VENOUS BLD VENIPUNCTURE: CPT | Performed by: INTERNAL MEDICINE

## 2025-01-13 PROCEDURE — 97116 GAIT TRAINING THERAPY: CPT | Mod: GP | Performed by: PHYSICAL THERAPIST

## 2025-01-13 PROCEDURE — 255N000002 HC RX 255 OP 636: Performed by: INTERNAL MEDICINE

## 2025-01-13 PROCEDURE — 120N000004 HC R&B MS OVERFLOW

## 2025-01-13 PROCEDURE — 36415 COLL VENOUS BLD VENIPUNCTURE: CPT | Performed by: STUDENT IN AN ORGANIZED HEALTH CARE EDUCATION/TRAINING PROGRAM

## 2025-01-13 PROCEDURE — 82565 ASSAY OF CREATININE: CPT | Performed by: STUDENT IN AN ORGANIZED HEALTH CARE EDUCATION/TRAINING PROGRAM

## 2025-01-13 PROCEDURE — 99222 1ST HOSP IP/OBS MODERATE 55: CPT | Performed by: INTERNAL MEDICINE

## 2025-01-13 PROCEDURE — 93306 TTE W/DOPPLER COMPLETE: CPT | Mod: 26 | Performed by: INTERNAL MEDICINE

## 2025-01-13 PROCEDURE — 84484 ASSAY OF TROPONIN QUANT: CPT | Performed by: INTERNAL MEDICINE

## 2025-01-13 PROCEDURE — 250N000011 HC RX IP 250 OP 636: Performed by: INTERNAL MEDICINE

## 2025-01-13 PROCEDURE — 99233 SBSQ HOSP IP/OBS HIGH 50: CPT | Performed by: INTERNAL MEDICINE

## 2025-01-13 PROCEDURE — G0463 HOSPITAL OUTPT CLINIC VISIT: HCPCS

## 2025-01-13 PROCEDURE — 97165 OT EVAL LOW COMPLEX 30 MIN: CPT | Mod: GO

## 2025-01-13 RX ADMIN — SIMVASTATIN 40 MG: 40 TABLET, FILM COATED ORAL at 20:20

## 2025-01-13 RX ADMIN — ROPINIROLE HYDROCHLORIDE 0.5 MG: 0.25 TABLET, FILM COATED ORAL at 08:21

## 2025-01-13 RX ADMIN — ENOXAPARIN SODIUM 40 MG: 40 INJECTION SUBCUTANEOUS at 20:21

## 2025-01-13 RX ADMIN — SPIRONOLACTONE 25 MG: 25 TABLET, FILM COATED ORAL at 08:23

## 2025-01-13 RX ADMIN — CITALOPRAM 40 MG: 40 TABLET ORAL at 11:54

## 2025-01-13 RX ADMIN — FUROSEMIDE 20 MG: 20 TABLET ORAL at 08:23

## 2025-01-13 RX ADMIN — GABAPENTIN 100 MG: 100 CAPSULE ORAL at 08:22

## 2025-01-13 RX ADMIN — ACETAMINOPHEN 975 MG: 325 TABLET ORAL at 08:20

## 2025-01-13 RX ADMIN — MEROPENEM 1 G: 1 INJECTION, POWDER, FOR SOLUTION INTRAVENOUS at 02:12

## 2025-01-13 RX ADMIN — MEROPENEM 1 G: 1 INJECTION, POWDER, FOR SOLUTION INTRAVENOUS at 09:08

## 2025-01-13 RX ADMIN — VANCOMYCIN HYDROCHLORIDE 1000 MG: 1 INJECTION, SOLUTION INTRAVENOUS at 09:51

## 2025-01-13 RX ADMIN — GABAPENTIN 100 MG: 100 CAPSULE ORAL at 14:29

## 2025-01-13 RX ADMIN — POTASSIUM CHLORIDE 20 MEQ: 750 TABLET, EXTENDED RELEASE ORAL at 08:22

## 2025-01-13 RX ADMIN — ACETAMINOPHEN 975 MG: 325 TABLET ORAL at 20:21

## 2025-01-13 RX ADMIN — PERFLUTREN 2 ML: 6.52 INJECTION, SUSPENSION INTRAVENOUS at 11:20

## 2025-01-13 RX ADMIN — HYDROMORPHONE HYDROCHLORIDE 0.5 MG: 1 INJECTION, SOLUTION INTRAMUSCULAR; INTRAVENOUS; SUBCUTANEOUS at 05:09

## 2025-01-13 RX ADMIN — POTASSIUM CHLORIDE 20 MEQ: 750 TABLET, EXTENDED RELEASE ORAL at 20:19

## 2025-01-13 RX ADMIN — ROPINIROLE HYDROCHLORIDE 1 MG: 1 TABLET, FILM COATED ORAL at 20:20

## 2025-01-13 RX ADMIN — HYDROMORPHONE HYDROCHLORIDE 0.5 MG: 1 INJECTION, SOLUTION INTRAMUSCULAR; INTRAVENOUS; SUBCUTANEOUS at 22:23

## 2025-01-13 RX ADMIN — OXYCODONE HYDROCHLORIDE 5 MG: 5 TABLET ORAL at 14:39

## 2025-01-13 RX ADMIN — HYDROMORPHONE HYDROCHLORIDE 0.5 MG: 1 INJECTION, SOLUTION INTRAMUSCULAR; INTRAVENOUS; SUBCUTANEOUS at 09:51

## 2025-01-13 RX ADMIN — BUPROPION HYDROCHLORIDE 150 MG: 150 TABLET, FILM COATED, EXTENDED RELEASE ORAL at 08:24

## 2025-01-13 RX ADMIN — GABAPENTIN 100 MG: 100 CAPSULE ORAL at 20:20

## 2025-01-13 RX ADMIN — PANTOPRAZOLE SODIUM 40 MG: 20 TABLET, DELAYED RELEASE ORAL at 08:22

## 2025-01-13 RX ADMIN — ASPIRIN 81 MG CHEWABLE TABLET 81 MG: 81 TABLET CHEWABLE at 08:22

## 2025-01-13 RX ADMIN — MEROPENEM 1 G: 1 INJECTION, POWDER, FOR SOLUTION INTRAVENOUS at 17:32

## 2025-01-13 RX ADMIN — ENOXAPARIN SODIUM 40 MG: 40 INJECTION SUBCUTANEOUS at 08:24

## 2025-01-13 RX ADMIN — MAGNESIUM OXIDE TAB 400 MG (241.3 MG ELEMENTAL MG) 400 MG: 400 (241.3 MG) TAB at 08:23

## 2025-01-13 RX ADMIN — ACETAMINOPHEN 975 MG: 325 TABLET ORAL at 14:29

## 2025-01-13 ASSESSMENT — ACTIVITIES OF DAILY LIVING (ADL)
ADLS_ACUITY_SCORE: 39
ADLS_ACUITY_SCORE: 49
ADLS_ACUITY_SCORE: 39
ADLS_ACUITY_SCORE: 49
ADLS_ACUITY_SCORE: 39
ADLS_ACUITY_SCORE: 49
ADLS_ACUITY_SCORE: 39
ADLS_ACUITY_SCORE: 49
ADLS_ACUITY_SCORE: 49
ADLS_ACUITY_SCORE: 39
ADLS_ACUITY_SCORE: 49
ADLS_ACUITY_SCORE: 39
ADLS_ACUITY_SCORE: 39
ADLS_ACUITY_SCORE: 49
ADLS_ACUITY_SCORE: 49

## 2025-01-13 NOTE — DISCHARGE INSTRUCTIONS
WOC DISCHARGE INSTRUCTIONS:  RLE  1. Soak wound and devon skin with Vashe moistened gauze for 5 minutes, dry gently  2. Cover wound with xeroform gauze (will take 4) PS #[id number]  3. Wrap with Exudry PS #[id number] (Cut to fit, can use 2 pieces if needed)  3. Wrap with kerlix toes to knee (2 rolls)  Change daily

## 2025-01-13 NOTE — CONSULTS
[facility] Nurse Inpatient Assessment     Consulted for: right lower leg    Summary: Patient is seen by wound clinic in [place], wound orders entered per patient's plan.    Patient History (according to provider note(s):      Per H+P:  79 year old female with chronic right leg lymphedema and venous stasis admitted on 1/12/2025 for recurrent right lower leg cellulitis and sepsis.      Right lower extremity cellulitis, Sepsis  Presents with right lower leg increased erythema, swelling and pain for one week.  Has fever, leukocytosis, and tachycardia. Meets criteria for sepsis.  Previous wound culture grew Pseudomonas, MSSA, Proteus, and Stenotrophomonas   History of chronic venous stasis ulcer limited to the skin and lymphedema.  - Empirically start Meropenem and Vancomycin  - Repeat wound culture  - ID consult  - Continue wound care    Assessment:      Areas visualized during today's visit: Lower extremities     Wound location: RLE     Right lateral leg 1/13     Right medial leg 1/13  Wound due to: Venous Ulcer and lymphedema  Wound history/plan of care: chronic - seen previously by this team  Wound base: partial to full thickness     Palpation of the wound bed: normal      Drainage: moderate     Description of drainage: serosanguinous     Measurements (length x width x depth, in cm): circumferential knee to ankle     Tunneling: N/A     Undermining: N/A  Periwound skin: Intact      Color: normal and consistent with surrounding tissue      Temperature: normal   Odor: none  Pain: moderate  Pain interventions prior to dressing change: IV per orders  Treatment goal: Infection control/prevention and Promote epidermal migration  STATUS: initial assessment this encounter  Supplies ordered: gathered       Treatment Plan:     RLE  1. Soak wound and devon skin with Vashe moistened gauze for 5 minutes, dry gently  2. Cover wound with xeroform gauze (will take 4) PS #[id number]  3. Wrap with Exudry PS  #[id number] (Cut to fit, can use 2 pieces if needed)  3. Wrap with kerlix toes to knee (2 rolls)  Change daily    Orders: Written    RECOMMEND PRIMARY TEAM ORDER: Lymphedema consult  Education provided: plan of care  Discussed plan of care with: Patient and Nurse  WO nurse follow-up plan: weekly  Notify WOC if wound(s) deteriorate.  Nursing to notify the Provider(s) and re-consult the WOC Nurse if new skin concern.    DATA:     Current support surface: Standard  Standard gel mattress (Isoflex)  BMI: Body mass index is 46.61 kg/m .   Active diet order: Orders Placed This Encounter      Regular Diet Adult     Output: I/O last 3 completed shifts:  In: 3117 [P.O.:480; IV Piggyback:2637]  Out: 350 [Urine:350]     Labs:   Recent Labs   Lab 01/12/25  1104   ALBUMIN 3.2*   HGB 10.1*   WBC 17.1*     Pressure injury risk assessment:   Sensory Perception: 4-->no impairment  Moisture: 3-->occasionally moist  Activity: 3-->walks occasionally  Mobility: 3-->slightly limited  Nutrition: 3-->adequate  Friction and Shear: 2-->potential problem  Ajith Score: 18    ALBARO OchoaN, RN, PHN, HNB-BC, CWOCN  Pager no longer is use, please contact through [facility] group: [facility] [employer]oc[employer] [facility]

## 2025-01-13 NOTE — PROGRESS NOTES
"   01/13/25 1323   Appointment Info   Signing Clinician's Name / Credentials (PT) Juanita Gray, PT, DPT   Living Environment   People in Home significant other   Current Living Arrangements house   Home Accessibility stairs to enter home   Number of Stairs, Main Entrance 5   Stair Railings, Main Entrance railings safe and in good condition   Living Environment Comments Able to live on main level for all ADLs/IADLs.   Self-Care   Equipment Currently Used at Home cane, straight;walker, rolling  (uses cane for community mobility only, no AD inside the house, has a 4WW and FWW but does not use)   Fall history within last six months no  (states she had a fall 7 months ago)   Activity/Exercise/Self-Care Comment Pt independent with all ADLs. Shares household tasks with significant other.   General Information   Onset of Illness/Injury or Date of Surgery 01/12/25   Referring Physician Wanda Lipscomb DO   Patient/Family Therapy Goals Statement (PT) None stated.   Pertinent History of Current Problem (include personal factors and/or comorbidities that impact the POC) Per H&P: \"79 year old female with chronic right leg lymphedema and venous stasis admitted on 1/12/2025 for recurrent right lower leg cellulitis and sepsis. \"   Existing Precautions/Restrictions fall   Range of Motion (ROM)   Range of Motion ROM deficits secondary to weakness   Strength (Manual Muscle Testing)   Strength (Manual Muscle Testing) Deficits observed during functional mobility   Bed Mobility   Bed Mobility supine-sit;sit-supine   Supine-Sit [place] (Bed Mobility) minimum assist (75% patient effort);2 person assist   Sit-Supine [place] (Bed Mobility) minimum assist (75% patient effort);2 person assist   Bed Mobility Limitations decreased ability to use arms for pushing/pulling;decreased ability to use legs for bridging/pushing   Impairments Contributing to Impaired Bed Mobility decreased strength   Assistive Device (Bed " Mobility) bed rails   Transfers   Transfers sit-stand transfer   Transfer Safety Concerns Noted decreased weight-shifting ability   Impairments Contributing to Impaired Transfers impaired balance;decreased strength   Sit-Stand Transfer   Sit-Stand [place] (Transfers) minimum assist (75% patient effort);moderate assist (50% patient effort);2 person assist;verbal cues   Assistive Device (Sit-Stand Transfers) walker, front-wheeled   Gait/Stairs (Locomotion)   [place] Level (Gait) minimum assist (75% patient effort);verbal cues   Assistive Device (Gait) walker, front-wheeled   Distance in Feet (Gait) 3'   Pattern (Gait) step-to   Deviations/Abnormal Patterns (Gait) natasha decreased;gait speed decreased   Clinical Impression   Criteria for Skilled Therapeutic Intervention Yes, treatment indicated   PT Diagnosis (PT) impaired functional mobility   Influenced by the following impairments decreased strength, impaired balance, decreased activity tolerance, pain   Functional limitations due to impairments transfers, ambulation, stairs   Clinical Presentation (PT Evaluation Complexity) stable   Clinical Presentation Rationale Clinical judgment.   Clinical Decision Making (Complexity) low complexity   Planned Therapy Interventions (PT) balance training;bed mobility training;gait training;home exercise program;neuromuscular re-education;patient/family education;strengthening;stair training;transfer training   Risk & Benefits of therapy have been explained evaluation/treatment results reviewed;participants voiced agreement with care plan;participants included;patient   PT Total Evaluation Time   PT Eval, Low Complexity Minutes (05958) 10   Physical Therapy Goals   PT Frequency Daily   PT Predicted Duration/Target Date for Goal Attainment 01/20/25   PT Goals Bed Mobility;Transfers;Gait;Stairs   PT: Bed Mobility Supervision/stand-by assist;Supine to/from sit   PT: Transfers Supervision/stand-by assist;Sit to/from  stand;Assistive device   PT: Gait Supervision/stand-by assist;Assistive device;Rolling walker;100 feet   PT: Stairs Supervision/stand-by assist;5 stairs;Rail on both sides   Interventions   Interventions Quick Adds Gait Training   Gait Training   Gait Training Minutes (77925) 9   Symptoms Noted During/After Treatment (Gait Training) fatigue   Treatment Detail/Skilled Intervention With FWW. Pt demonstrates shortened step lengths and wide JOSE. Cues for walker proximity and posture.   Distance in Feet additional 12'   [place] Level (Gait Training) minimum assist (75% patient effort)   Physical Assistance Level (Gait Training) verbal cues;1 person assist   Assistive Device (Gait Training) rolling walker   Gait Analysis Deviations decreased natasha;decreased step length   Impairments (Gait Analysis/Training) balance impaired;strength decreased   PT Discharge Planning   PT Plan transfers, gait with FWW, LE strengthening   PT Discharge Recommendation (DC Rec) Transitional Care Facility   PT Rationale for DC Rec Pt requiring hands on assist of 1 for bed mobility and transfers. Pt ambulating short distances. Recommend TCU as safest option at discharge.   PT Brief overview of current status Supine <> sit, min assist of 1-2. Sit <> stand with FWW, min assist of 1. Ambulates 15' with FWW, min assist of 1.   PT Total Distance Amb During Session (feet) 15   Physical Therapy Time and Intention   Timed Code Treatment Minutes 9   Total Session Time (sum of timed and untimed services) 19

## 2025-01-13 NOTE — PLAN OF CARE
Goal Outcome Evaluation:    Admitted from ED with complain of right leg cellulitis. Pt alert and oriented. Denied SOB and chest pain. Hooked to 2 lpm O2 via NS; sats at 92%.  Purewick in place. Did complain of tenderness on BLE. Refused to remove acewraps for skin check.  PRN dilaudid IV given for 8/10 pain; effective.  Call light within reach. Bed alarm on for safety.

## 2025-01-13 NOTE — PLAN OF CARE
Problem: Gas Exchange Impaired  Goal: Optimal Gas Exchange  Outcome: Progressing     Problem: Infection  Goal: Absence of Infection Signs and Symptoms  Outcome: Progressing   Goal Outcome Evaluation:       Pt reports ongoing pain in RLE.  Writer gave prn dilaudid prior to dressing change with WOC RN today.  New orders for dressing change daily.     PT was later having shooting pain that would reside.  Pt was given po oxycodone with therapy coming shortly.     Pt is up to bedside commode with walker and ax1.

## 2025-01-13 NOTE — PROGRESS NOTES
"[facility]    PROGRESS NOTE - Hospitalist Service    ASSESSMENT AND PLAN     Active Problems:    Hypoxia    Elevated troponin    Chronic ulcer of right leg (H)    Cellulitis of right lower leg    Acute sepsis (H)    Elizabeth Kelley is a 79 year old female with chronic right leg lymphedema and venous stasis admitted on 1/12/2025 for recurrent right lower leg cellulitis and sepsis.      Right lower extremity cellulitis, Sepsis  Previous wound culture grew Pseudomonas, MSSA, Proteus, and Stenotrophomonas   History of chronic venous stasis ulcer limited to the skin and lymphedema.  - Meropenem and Vancomycin- ID agrees   - Repeat wound culture  - Continue wound care     Elevated troponin:  Troponin trended up from 91 to 149 to 187.  Patient reports no chest pain and SOB.   EKG shows sinus tachycardia.  - Per ER discussion with cardiology, likely due to demand ischemia from sepsis. Hold heparin drip. Continue to trend troponin.   - ECHO pending      Hypoxia:   Briefly needed supplemental oxygen at 2 LPM- now off  Likely due to acute infection +/- body habitus      Chronic bilateral lower extremity lymphedema:  - Continue PTA lasix and spironolactone     Restless leg syndrome: Continue PTA ropinirole.      Anxiety and depression: Continue PTA Wellbutrin and Celexa.    Barriers to discharge: IV antibiotics, ID plan, PT/OT    Anticipated length of stay: 2 days     Medically Ready for Discharge: Anticipated in 2-4 Days    Clinically Significant Risk Factors Present on Admission               # Hypoalbuminemia: Lowest albumin = 3.2 g/dL at 1/12/2025 11:04 AM, will monitor as appropriate   # Drug Induced Platelet Defect: home medication list includes an antiplatelet medication   # Hypertension: Noted on problem list      # Anemia: based on hgb <11       # Severe Obesity: Estimated body mass index is 46.61 kg/m  as calculated from the following:    Height as of this encounter: 1.549 m (5' 1\").    Weight " as of this encounter: 111.9 kg (246 lb 11.1 oz).       # Financial/Environmental Concerns:               Subjective:  Patient resting comfortably in bed.  Complains of pain to her right lower extremity.  She appears anxious about wound changes.  Denies any breathing issues or chest pain.    PHYSICAL EXAM  Temp:  [98.5  F (36.9  C)-101.4  F (38.6  C)] 98.5  F (36.9  C)  Pulse:  [] 69  Resp:  [13-26] 20  BP: ()/(46-78) 94/46  SpO2:  [91 %-100 %] 97 %  Wt Readings from Last 1 Encounters:   01/13/25 111.9 kg (246 lb 11.1 oz)       Intake/Output Summary (Last 24 hours) at 1/13/2025 1219  Last data filed at 1/12/2025 2345  Gross per 24 hour   Intake 2400 ml   Output 350 ml   Net 2050 ml      Body mass index is 46.61 kg/m .    GENRL: Alert and answering questions appropriately. Not in acute distress. Lying in bed   CHEST: Clear to auscultation bilaterally. No wheezes, rhonchi or crackles. Breathing easily   HEART: Regular rate and rhythm  ABDMN: Soft. Obese  EXTRM: Pedal edema to bilateral lower extremities.  Significant erythema and draining wound to right lower extremity.  Picture can be seen in infectious disease consultation note.  Lymphedema noted to bilateral lower extremities.    NEURO: No focal neurological deficit. No involuntary movements. Normal mentation  PSYCH: Normal affect and mood.   INTGM: No skin rash    Medical Decision Making       55 MINUTES SPENT BY ME on the date of service doing chart review, history, exam, documentation & further activities per the note.      PERTINENT LABS/IMAGING:  Results for orders placed or performed during the hospital encounter of 01/12/25   CT Chest Pulmonary Embolism w Contrast    Impression    IMPRESSION:  1.  No evidence of pulmonary embolism.  2.  No consolidation or pleural effusion.  3.  Moderate hiatal hernia.     Most Recent 3 CBC's:  Recent Labs   Lab Test 01/12/25  1104 10/14/24  1543 09/13/24  1155   WBC 17.1* 9.0 8.0   HGB 10.1* 10.5* 9.7*   MCV 81  "87 86   * 495* 381     Most Recent 3 BMP's:  Recent Labs   Lab Test 01/13/25  0505 01/12/25  1104 10/14/24  1543 09/13/24  1155   NA  --  137 136 141   POTASSIUM  --  4.5 4.2 4.2   CHLORIDE  --  102 102 100   CO2  --  23 20* 30*   BUN  --  19.2 19.6 27.6*   CR 1.05* 0.95 1.07* 0.97*   ANIONGAP  --  12 14 11   JUNI  --  9.4 9.1 9.1   GLC  --  96 73 81     Most Recent 2 LFT's:  Recent Labs   Lab Test 01/12/25  1104 10/14/24  1543   AST 21 23   ALT 19 19   ALKPHOS 161* 182*   BILITOTAL 0.2 0.2       Recent Labs   Lab Test 02/13/24  1143   CHOL 207*   HDL 69   *   TRIG 122     Recent Labs   Lab Test 02/13/24  1143 02/10/23  1535 11/10/21  1551   * 80 68     Recent Labs   Lab Test 01/13/25  0505 01/12/25  1104   NA  --  137   POTASSIUM  --  4.5   CHLORIDE  --  102   CO2  --  23   GLC  --  96   BUN  --  19.2   CR 1.05* 0.95   GFRESTIMATED 54* 61   JUNI  --  9.4     Recent Labs   Lab Test 04/04/22  0523   A1C 5.4     Recent Labs   Lab Test 01/12/25  1104 10/14/24  1543 09/13/24  1155   HGB 10.1* 10.5* 9.7*     No results for input(s): \"TROPONINI\" in the last 38266 hours.  Recent Labs   Lab Test 01/12/25  1104 09/07/24  1347 07/19/24  0710 04/03/22  1258   NTBNPI  --  605 774 346   NTBNP 224  --   --   --      Recent Labs   Lab Test 04/03/22  2122   TSH 1.76     No results for input(s): \"INR\" in the last 34533 hours.    Wanda Lipscomb, DO  Hospitalist Service  [facility]      "

## 2025-01-13 NOTE — PLAN OF CARE
Problem: Adult Inpatient Plan of Care  Goal: Optimal Comfort and Wellbeing  Outcome: Progressing  Intervention: Monitor Pain and Promote Comfort  Recent Flowsheet Documentation  Taken 1/12/2025 2200 by Joy Hamilton RN  Pain Management Interventions: rest  Taken 1/12/2025 2144 by Joy Hamilton RN  Pain Management Interventions: medication (see MAR)  Taken 1/12/2025 1945 by Joy Hamilton RN  Pain Management Interventions: rest     Problem: Pain Acute  Goal: Optimal Pain Control and Function  Outcome: Progressing  Intervention: Develop Pain Management Plan  Recent Flowsheet Documentation  Taken 1/12/2025 2200 by Joy Hamilton RN  Pain Management Interventions: rest  Taken 1/12/2025 2144 by Joy Hamilton RN  Pain Management Interventions: medication (see MAR)  Taken 1/12/2025 1945 by Joy Hamilton RN  Pain Management Interventions: rest  Intervention: Prevent or Manage Pain  Recent Flowsheet Documentation  Taken 1/12/2025 2345 by Joy Hamilton RN  Medication Review/Management: medications reviewed  Taken 1/12/2025 1945 by Joy Hamilton RN  Medication Review/Management: medications reviewed     Problem: Gas Exchange Impaired  Goal: Optimal Gas Exchange  Outcome: Progressing  Intervention: Optimize Oxygenation and Ventilation  Recent Flowsheet Documentation  Taken 1/12/2025 2345 by Joy Hamilton RN  Head of Bed (HOB) Positioning: HOB at 30-45 degrees  Taken 1/12/2025 1945 by Joy Hamilton RN  Head of Bed (HOB) Positioning: HOB at 30-45 degrees     Problem: Comorbidity Management  Goal: Blood Pressure in Desired Range  Outcome: Progressing  Intervention: Maintain Blood Pressure Management  Recent Flowsheet Documentation  Taken 1/12/2025 2345 by Joy Hamilton RN  Medication Review/Management: medications reviewed  Taken 1/12/2025 1945 by Joy Hamilton RN  Medication Review/Management: medications reviewed   Goal Outcome Evaluation:      Plan of Care  Reviewed With: patient    Overall Patient Progress: improvingOverall Patient Progress: improving       A & O. Had low grade fever earlier in the shift, temperature trending downwards now. BP stable. On 2L of O2. IV dilaudid effective for right lower leg pain. Sinus rhythm. Pt refused to allow staff unwrap ace wraps to assess leg and do the leg swab. Troponin trending down.

## 2025-01-13 NOTE — PROGRESS NOTES
Occupational Therapy        01/13/25 1325   Appointment Info   Signing Clinician's Name / Credentials (OT) Jamilah Brower, OTR/L   Living Environment   People in Home significant other   Current Living Arrangements house   Home Accessibility stairs to enter home   Number of Stairs, Main Entrance 5   Stair Railings, Main Entrance railings safe and in good condition   Living Environment Comments Patient can perform ADLs on main level   Self-Care   Equipment Currently Used at Home cane, straight;walker, rolling  (ambulates inside without device)   Fall history within last six months no   General Information   Onset of Illness/Injury or Date of Surgery 01/13/25   Referring Physician Wanda Lipscomb,    Patient/Family Therapy Goal Statement (OT) none stated   Additional Occupational Profile Info/Pertinent History of Current Problem Elizabeth Kelley is a 79 year old female with chronic right leg lymphedema and venous stasis admitted on 1/12/2025 for recurrent right lower leg cellulitis and sepsis.   Existing Precautions/Restrictions fall   Cognitive Status Examination   Orientation Status orientation to person, place and time   Range of Motion Comprehensive   General Range of Motion no range of motion deficits identified   Strength Comprehensive (MMT)   General Manual Muscle Testing (MMT) Assessment no strength deficits identified   Bed Mobility   Bed Mobility supine-sit;sit-supine   Supine-Sit [place] (Bed Mobility) moderate assist (50% patient effort)   Sit-Supine [place] (Bed Mobility) minimum assist (75% patient effort)   Transfers   Transfers sit-stand transfer   Sit-Stand Transfer   Sit-Stand [place] (Transfers) minimum assist (75% patient effort)   Activities of Daily Living   BADL Assessment/Intervention lower body dressing   Lower Body Dressing Assessment/Training   [place] Level (Lower Body Dressing) maximum assist (25% patient effort);socks   Clinical Impression   Criteria for  Skilled Therapeutic Interventions Met (OT) Yes, treatment indicated   OT Diagnosis decreased ADL independence   OT Problem List-Impairments impacting ADL problems related to;activity tolerance impaired;balance;strength   Assessment of Occupational Performance 1-3 Performance Deficits   Identified Performance Deficits trsfs, bed mob, LE dress   Planned Therapy Interventions (OT) ADL retraining;transfer training;bed mobility training;balance training   Clinical Decision Making Complexity (OT) problem focused assessment/low complexity   Risk & Benefits of therapy have been explained evaluation/treatment results reviewed;care plan/treatment goals reviewed   OT Total Evaluation Time   OT Eval, Low Complexity Minutes (82727) 20   OT Goals   Therapy Frequency (OT) Daily   OT Predicted Duration/Target Date for Goal Attainment 01/20/25   OT Goals Lower Body Dressing;Transfers;Toilet Transfer/Toileting   OT: Lower Body Dressing Supervision/stand-by assist;using adaptive equipment   OT: Transfer Supervision/stand-by assist   OT: Toilet Transfer/Toileting Supervision/stand-by assist;cleaning and garment management;using adaptive equipment   OT Discharge Planning   OT Plan Trsfs, Dressing, toileting   OT Discharge Recommendation (DC Rec) Transitional Care Facility   OT Rationale for DC Rec Patient currently requiring assistance wtih all ADLs, in past patient has been motivated and progressed during hospital stay to discharge home. However, recommend TCU today based on patient's independence level   OT Brief overview of current status Min-Mod A for trsf   Total Session Time   Total Session Time (sum of timed and untimed services) 20

## 2025-01-13 NOTE — CONSULTS
"Infectious Disease Consultation:  Requesting MD:  Reason for consult:      HISTORY:  Pt known to me, 3rd admit for this problem since July 2024.  Recurrent stasis with flaring of cellulitis.    Last abx were in October, see ID notes.          Pertinent past history, past infectious disease history:  As in prior consults, reviewed    Medications:  Reviewed prior to admission meds as applicable in chart review.  Current meds are reviewed in the EMR listed MAR.     ANTIBIOTICS:    Current:V/M   Prior:   Allergy to:    SH/FH and  travel history(if applicable to consult):  Reviewed, no additions  REVIEW OF SYSTEMS:  All other systems negative    EXAMINATION:  BP 94/52 (BP Location: Right arm)   Pulse 77   Temp 99.6  F (37.6  C) (Oral)   Resp 18   Ht 1.549 m (5' 1\")   Wt 111.9 kg (246 lb 11.1 oz)   SpO2 96%   BMI 46.61 kg/m    Alert, awake  Vitals tabulated above, reviewed  HEENT:nl  Neck supple without lymphadenopathy  Sclera clear  CARDIOVASCULAR regular rate and rhythm, no murmur  Lungs CLEAR TO AUSCULTATION   Abdomen soft, NT/ND, absent HEPATOSPLENOMEGALY  Skin normal  Joints normal  Neurologic exam non focal  Wound:  NA  See picture      CLINICAL DATABASE FOR---LAB/MICRO/CULTURES/IMAGING STUDIES:  Lab Results   Component Value Date    WBC 17.1 01/12/2025     Lab Results   Component Value Date    RBC 4.11 01/12/2025     Lab Results   Component Value Date    HGB 10.1 01/12/2025     Lab Results   Component Value Date    HCT 33.4 01/12/2025     No components found for: \"MCT\"  Lab Results   Component Value Date    MCV 81 01/12/2025     Lab Results   Component Value Date    MCH 24.6 01/12/2025     Lab Results   Component Value Date    MCHC 30.2 01/12/2025     Lab Results   Component Value Date    RDW 16.4 01/12/2025     Lab Results   Component Value Date     01/12/2025       Last Comprehensive Metabolic Panel:  Sodium   Date Value Ref Range Status   01/12/2025 137 135 - 145 mmol/L Final     Potassium   Date " Value Ref Range Status   01/12/2025 4.5 3.4 - 5.3 mmol/L Final   06/24/2022 4.4 3.5 - 5.0 mmol/L Final     Chloride   Date Value Ref Range Status   01/12/2025 102 98 - 107 mmol/L Final   06/24/2022 100 98 - 107 mmol/L Final     Carbon Dioxide (CO2)   Date Value Ref Range Status   01/12/2025 23 22 - 29 mmol/L Final   06/24/2022 25 22 - 31 mmol/L Final     Anion Gap   Date Value Ref Range Status   01/12/2025 12 7 - 15 mmol/L Final   06/24/2022 14 5 - 18 mmol/L Final     Glucose   Date Value Ref Range Status   01/12/2025 96 70 - 99 mg/dL Final   06/24/2022 95 70 - 125 mg/dL Final     GLUCOSE BY METER POCT   Date Value Ref Range Status   09/07/2024 103 (H) 70 - 99 mg/dL Final     Urea Nitrogen   Date Value Ref Range Status   01/12/2025 19.2 8.0 - 23.0 mg/dL Final   06/24/2022 16 8 - 28 mg/dL Final     Creatinine   Date Value Ref Range Status   01/13/2025 1.05 (H) 0.51 - 0.95 mg/dL Final     GFR Estimate   Date Value Ref Range Status   01/13/2025 54 (L) >60 mL/min/1.73m2 Final     Comment:     eGFR calculated using 2021 CKD-EPI equation.   09/26/2020 >60 >60 mL/min/1.73m2 Final     Calcium   Date Value Ref Range Status   01/12/2025 9.4 8.8 - 10.4 mg/dL Final     Comment:     Reference intervals for this test were updated on 7/16/2024 to reflect our healthy population more accurately. There may be differences in the flagging of prior results with similar values performed with this method. Those prior results can be interpreted in the context of the updated reference intervals.       Liver Function Studies -   Recent Labs   Lab Test 01/12/25  1104   PROTTOTAL 6.9   ALBUMIN 3.2*   BILITOTAL 0.2   ALKPHOS 161*   AST 21   ALT 19       Erythrocyte Sedimentation Rate   Date Value Ref Range Status   01/12/2025 74 (H) 0 - 30 mm/hr Final                   IMPRESSION:  Aggressive stasis problem, with flares of celluitis      PLAN:  Maximal abx given  Probably likely to remain rest of her life in my estimation.  No great ideas on  any sort of process for resolution.        SUMA LOCKWOOD MD  [facility]  Office [phone]

## 2025-01-14 LAB
ANION GAP SERPL CALCULATED.3IONS-SCNC: 7 MMOL/L (ref 7–15)
BASOPHILS # BLD MANUAL: 0 10E3/UL (ref 0–0.2)
BASOPHILS NFR BLD MANUAL: 0 %
BUN SERPL-MCNC: 18 MG/DL (ref 8–23)
CALCIUM SERPL-MCNC: 8.9 MG/DL (ref 8.8–10.4)
CHLORIDE SERPL-SCNC: 104 MMOL/L (ref 98–107)
CREAT SERPL-MCNC: 1.02 MG/DL (ref 0.51–0.95)
EGFRCR SERPLBLD CKD-EPI 2021: 56 ML/MIN/1.73M2
EOSINOPHIL # BLD MANUAL: 0.4 10E3/UL (ref 0–0.7)
EOSINOPHIL NFR BLD MANUAL: 4 %
ERYTHROCYTE [DISTWIDTH] IN BLOOD BY AUTOMATED COUNT: 16.6 % (ref 10–15)
GLUCOSE SERPL-MCNC: 105 MG/DL (ref 70–99)
HCO3 SERPL-SCNC: 22 MMOL/L (ref 22–29)
HCT VFR BLD AUTO: 26.6 % (ref 35–47)
HGB BLD-MCNC: 7.9 G/DL (ref 11.7–15.7)
LYMPHOCYTES # BLD MANUAL: 0.5 10E3/UL (ref 0.8–5.3)
LYMPHOCYTES NFR BLD MANUAL: 6 %
MCH RBC QN AUTO: 24.2 PG (ref 26.5–33)
MCHC RBC AUTO-ENTMCNC: 29.7 G/DL (ref 31.5–36.5)
MCV RBC AUTO: 81 FL (ref 78–100)
MONOCYTES # BLD MANUAL: 0.4 10E3/UL (ref 0–1.3)
MONOCYTES NFR BLD MANUAL: 5 %
NEUTROPHILS # BLD MANUAL: 7.6 10E3/UL (ref 1.6–8.3)
NEUTROPHILS NFR BLD MANUAL: 85 %
PLAT MORPH BLD: ABNORMAL
PLAT MORPH BLD: NORMAL
PLATELET # BLD AUTO: 357 10E3/UL (ref 150–450)
POTASSIUM SERPL-SCNC: 4.2 MMOL/L (ref 3.4–5.3)
RBC # BLD AUTO: 3.27 10E6/UL (ref 3.8–5.2)
RBC MORPH BLD: ABNORMAL
RBC MORPH BLD: NORMAL
SODIUM SERPL-SCNC: 133 MMOL/L (ref 135–145)
WBC # BLD AUTO: 8.9 10E3/UL (ref 4–11)

## 2025-01-14 PROCEDURE — 80048 BASIC METABOLIC PNL TOTAL CA: CPT | Performed by: INTERNAL MEDICINE

## 2025-01-14 PROCEDURE — 250N000009 HC RX 250: Performed by: INTERNAL MEDICINE

## 2025-01-14 PROCEDURE — 36415 COLL VENOUS BLD VENIPUNCTURE: CPT | Performed by: INTERNAL MEDICINE

## 2025-01-14 PROCEDURE — 272N000450 HC KIT 4FR POWER PICC SINGLE LUMEN

## 2025-01-14 PROCEDURE — 99232 SBSQ HOSP IP/OBS MODERATE 35: CPT | Performed by: INTERNAL MEDICINE

## 2025-01-14 PROCEDURE — 85007 BL SMEAR W/DIFF WBC COUNT: CPT | Performed by: INTERNAL MEDICINE

## 2025-01-14 PROCEDURE — 85018 HEMOGLOBIN: CPT | Performed by: INTERNAL MEDICINE

## 2025-01-14 PROCEDURE — 250N000013 HC RX MED GY IP 250 OP 250 PS 637: Performed by: INTERNAL MEDICINE

## 2025-01-14 PROCEDURE — 120N000004 HC R&B MS OVERFLOW

## 2025-01-14 PROCEDURE — 250N000011 HC RX IP 250 OP 636: Performed by: STUDENT IN AN ORGANIZED HEALTH CARE EDUCATION/TRAINING PROGRAM

## 2025-01-14 PROCEDURE — 36569 INSJ PICC 5 YR+ W/O IMAGING: CPT

## 2025-01-14 PROCEDURE — 250N000011 HC RX IP 250 OP 636: Performed by: INTERNAL MEDICINE

## 2025-01-14 PROCEDURE — 82374 ASSAY BLOOD CARBON DIOXIDE: CPT | Performed by: INTERNAL MEDICINE

## 2025-01-14 RX ORDER — LIDOCAINE 40 MG/G
CREAM TOPICAL
Status: ACTIVE | OUTPATIENT
Start: 2025-01-14 | End: 2025-01-17

## 2025-01-14 RX ORDER — BENZOCAINE/MENTHOL 6 MG-10 MG
LOZENGE MUCOUS MEMBRANE 2 TIMES DAILY
Status: DISCONTINUED | OUTPATIENT
Start: 2025-01-14 | End: 2025-01-18 | Stop reason: HOSPADM

## 2025-01-14 RX ADMIN — HYDROMORPHONE HYDROCHLORIDE 0.5 MG: 1 INJECTION, SOLUTION INTRAMUSCULAR; INTRAVENOUS; SUBCUTANEOUS at 22:19

## 2025-01-14 RX ADMIN — OXYCODONE HYDROCHLORIDE 5 MG: 5 TABLET ORAL at 16:21

## 2025-01-14 RX ADMIN — HYDROCORTISONE: 1 CREAM TOPICAL at 20:43

## 2025-01-14 RX ADMIN — ASPIRIN 81 MG CHEWABLE TABLET 81 MG: 81 TABLET CHEWABLE at 08:36

## 2025-01-14 RX ADMIN — GABAPENTIN 100 MG: 100 CAPSULE ORAL at 20:40

## 2025-01-14 RX ADMIN — ACETAMINOPHEN 975 MG: 325 TABLET ORAL at 08:36

## 2025-01-14 RX ADMIN — CITALOPRAM 40 MG: 40 TABLET ORAL at 12:06

## 2025-01-14 RX ADMIN — HYDROMORPHONE HYDROCHLORIDE 0.5 MG: 1 INJECTION, SOLUTION INTRAMUSCULAR; INTRAVENOUS; SUBCUTANEOUS at 04:53

## 2025-01-14 RX ADMIN — ROPINIROLE HYDROCHLORIDE 0.5 MG: 0.25 TABLET, FILM COATED ORAL at 08:38

## 2025-01-14 RX ADMIN — MAGNESIUM OXIDE TAB 400 MG (241.3 MG ELEMENTAL MG) 400 MG: 400 (241.3 MG) TAB at 08:37

## 2025-01-14 RX ADMIN — LIDOCAINE HYDROCHLORIDE 2 ML: 10 INJECTION, SOLUTION EPIDURAL; INFILTRATION; INTRACAUDAL; PERINEURAL at 18:11

## 2025-01-14 RX ADMIN — ENOXAPARIN SODIUM 40 MG: 40 INJECTION SUBCUTANEOUS at 20:45

## 2025-01-14 RX ADMIN — SIMVASTATIN 40 MG: 40 TABLET, FILM COATED ORAL at 20:41

## 2025-01-14 RX ADMIN — ACETAMINOPHEN 975 MG: 325 TABLET ORAL at 14:33

## 2025-01-14 RX ADMIN — FUROSEMIDE 20 MG: 20 TABLET ORAL at 08:36

## 2025-01-14 RX ADMIN — POTASSIUM CHLORIDE 20 MEQ: 750 TABLET, EXTENDED RELEASE ORAL at 08:35

## 2025-01-14 RX ADMIN — MEROPENEM 1 G: 1 INJECTION, POWDER, FOR SOLUTION INTRAVENOUS at 18:15

## 2025-01-14 RX ADMIN — POTASSIUM CHLORIDE 20 MEQ: 750 TABLET, EXTENDED RELEASE ORAL at 20:39

## 2025-01-14 RX ADMIN — SPIRONOLACTONE 25 MG: 25 TABLET, FILM COATED ORAL at 08:36

## 2025-01-14 RX ADMIN — ACETAMINOPHEN 975 MG: 325 TABLET ORAL at 20:39

## 2025-01-14 RX ADMIN — GABAPENTIN 100 MG: 100 CAPSULE ORAL at 14:33

## 2025-01-14 RX ADMIN — MEROPENEM 1 G: 1 INJECTION, POWDER, FOR SOLUTION INTRAVENOUS at 10:30

## 2025-01-14 RX ADMIN — BUPROPION HYDROCHLORIDE 150 MG: 150 TABLET, FILM COATED, EXTENDED RELEASE ORAL at 08:38

## 2025-01-14 RX ADMIN — HYDROMORPHONE HYDROCHLORIDE 0.5 MG: 1 INJECTION, SOLUTION INTRAMUSCULAR; INTRAVENOUS; SUBCUTANEOUS at 11:12

## 2025-01-14 RX ADMIN — MEROPENEM 1 G: 1 INJECTION, POWDER, FOR SOLUTION INTRAVENOUS at 03:25

## 2025-01-14 RX ADMIN — VANCOMYCIN HYDROCHLORIDE 1000 MG: 1 INJECTION, SOLUTION INTRAVENOUS at 12:03

## 2025-01-14 RX ADMIN — GABAPENTIN 100 MG: 100 CAPSULE ORAL at 08:37

## 2025-01-14 RX ADMIN — MICONAZOLE NITRATE: 20 POWDER TOPICAL at 22:10

## 2025-01-14 RX ADMIN — ROPINIROLE HYDROCHLORIDE 1 MG: 1 TABLET, FILM COATED ORAL at 20:41

## 2025-01-14 RX ADMIN — ENOXAPARIN SODIUM 40 MG: 40 INJECTION SUBCUTANEOUS at 08:40

## 2025-01-14 RX ADMIN — PANTOPRAZOLE SODIUM 40 MG: 20 TABLET, DELAYED RELEASE ORAL at 08:38

## 2025-01-14 ASSESSMENT — ACTIVITIES OF DAILY LIVING (ADL)
ADLS_ACUITY_SCORE: 49
ADLS_ACUITY_SCORE: 44
ADLS_ACUITY_SCORE: 49
ADLS_ACUITY_SCORE: 44
ADLS_ACUITY_SCORE: 49
ADLS_ACUITY_SCORE: 49
ADLS_ACUITY_SCORE: 44
DEPENDENT_IADLS:: INDEPENDENT
ADLS_ACUITY_SCORE: 49
ADLS_ACUITY_SCORE: 44
ADLS_ACUITY_SCORE: 49
ADLS_ACUITY_SCORE: 49
ADLS_ACUITY_SCORE: 44
ADLS_ACUITY_SCORE: 44
ADLS_ACUITY_SCORE: 49
ADLS_ACUITY_SCORE: 44

## 2025-01-14 NOTE — PLAN OF CARE
Problem: Adult Inpatient Plan of Care  Goal: Optimal Comfort and Wellbeing  Outcome: Progressing  Intervention: Monitor Pain and Promote Comfort  Recent Flowsheet Documentation  Taken 1/14/2025 0453 by Kelley Garcia RN  Pain Management Interventions:   medication (see MAR)   pillow support provided   repositioned   rest  Taken 1/14/2025 0329 by Kelley Garcia, RN  Pain Management Interventions:   rest   repositioned  Taken 1/14/2025 0030 by Kelley Garcia RN  Pain Management Interventions:   rest   repositioned     Problem: Infection  Goal: Absence of Infection Signs and Symptoms  Outcome: Progressing  Intervention: Prevent or Manage Infection  Recent Flowsheet Documentation  Taken 1/14/2025 0329 by Kelley Garcia RN  Isolation Precautions: contact precautions maintained  Taken 1/14/2025 0030 by Kelley Garcia RN  Isolation Precautions: contact precautions maintained   Goal Outcome Evaluation:    Pt restful over noc. Right leg wrapped, is weeping serous and serosanguinous drainage. Pain well controlled. IV abx per md order. Vitals stable. Able to make needs known.

## 2025-01-14 NOTE — PROCEDURES
"PICC Line Insertion Procedure Note  Pt. Name: Elizabeth Kelley  MRN:        [id number]    Procedure: Insertion of a  single Lumen  4 fr  Bard SOLO (valved) Power PICC, Lot number [id number]    Indications: Long term antibiotic    Contraindications : none    Procedure Details     Patient identified with 2 identifiers and \"Time Out\" conducted.  .     Central line insertion bundle followed: hand hygiene performed prior to procedure, site cleansed with cholraprep, hat, mask, sterile gloves, sterile gown worn, patient draped with maximum barrier head to toe drape, sterile field maintained.    The vein was assessed and found to be compressible and of adequate size.     Lidocaine 1% 2 ml administered sq to the insertion site. A 4 Fr PICC was inserted into the basilic vein of the leftarm with ultrasound guidance. 1 attempt(s) required to access vein.   Catheter threaded without difficulty. Good blood return noted.    Modified Seldinger Technique used for insertion.    The 8 sharps that are included in the PICC insertion kit were accounted for and disposed of in the sharps container prior to breakdown of the sterile field.    Catheter secured with Statlock, biopatch and Tegaderm dressing applied.    Findings:    Total catheter length  47 cm, with 0 cm exposed. Mid upper arm circumference is 39 cm. Catheter was flushed with 20 cc NS. Patient  tolerated procedure well.    Tip placement verified by 3CG technology. Tip placement in the SVC/RA junction.    CLABSI prevention brochure left at bedside.    Patient's primary RN notified PICC is ready for use.      Comments:          Sissy Valencia RN      "

## 2025-01-14 NOTE — PROGRESS NOTES
[facility]    Medicine Progress Note - Hospitalist Service    Date of Admission:  1/12/2025    Assessment & Plan   Elizabeth Kelley is a 79 year old female with chronic right leg lymphedema and venous stasis admitted on 1/12/2025 for recurrent right lower leg cellulitis and sepsis.     Right lower extremity cellulitis, Sepsis  -Presents with right lower leg increased erythema, swelling and pain for one week.  -Has fever, leukocytosis, and tachycardia. Meets criteria for sepsis.  -Previous wound culture grew Pseudomonas, MSSA, Proteus, and Stenotrophomonas   -History of chronic venous stasis ulcer limited to the skin and lymphedema.  -Empirically start Meropenem and Vancomycin  -Repeat wound culture  -ID consulted  -Continue wound care    IV Infiltration  -Right arm IV infiltrated with vancomycin 1/14  -Apply warm compress  -She is a difficult stick and has had 2 IVs fail  -PICC line ordered for access    Rash on back-likely contact dermatitis  -Started on hydrocortisone cream    Elevated troponin- demand ischemia in th setting of sepsis  -Troponin 91 > 149 > 187 > 176 > 177 > 139  -Patient reports no chest pain and SOB  -EKG shows sinus tachycardia.  -Per ER discussion with cardiology, likely due to demand ischemia from sepsis. Hold heparin drip. Continue to trend troponin. If it continues to trend up, low threshold to start heparin drip.  -Echo 1/13: LVEF 55 to 60%, normal RV size and function, very mild pulmonary hypertension, no significant valvular abnormalities    Acute hypoxic respiratory failure- resolved  -Briefly needed supplemental oxygen at 2 LPM and now no longer needed. CT chest shows no PE and focal infiltrate. Likely due to decreased breathing while taking pain medication.   -Continue to monitor    Chronic bilateral lower extremity lymphedema  -Continue PTA lasix and spironolactone    Restless leg syndrome  -Continue PTA ropinirole    Anxiety and depression  -Continue PTA  "Wellbutrin and Celexa          Diet: Regular Diet Adult    DVT Prophylaxis: Enoxaparin (Lovenox) SQ  Amato Catheter: Not present  Lines: None     Cardiac Monitoring: None  Code Status: Full Code      Clinically Significant Risk Factors         # Hyponatremia: Lowest Na = 133 mmol/L in last 2 days, will monitor as appropriate       # Hypoalbuminemia: Lowest albumin = 3.2 g/dL at 1/12/2025 11:04 AM, will monitor as appropriate     # Hypertension: Noted on problem list            # Severe Obesity: Estimated body mass index is 46.11 kg/m  as calculated from the following:    Height as of this encounter: 1.549 m (5' 1\").    Weight as of this encounter: 110.7 kg (244 lb 0.8 oz)., PRESENT ON ADMISSION     # Financial/Environmental Concerns: none         Social Drivers of Health    Tobacco Use: Medium Risk (10/21/2024)    Patient History     Smoking Tobacco Use: Former     Smokeless Tobacco Use: Never          Disposition Plan     Medically Ready for Discharge: Anticipated in 2-4 Days             Kelley Mckeon MD  Hospitalist Service  [facility]  Securely message with [employer] Meet[employer] (more info)  Text page via [employer] Paging/Directory   ______________________________________________________________________    Interval History   Ms. Kelley was doing well today but then had her IV infiltrate while getting vancomycin.  This was painful.  Otherwise she is feeling well.    Physical Exam   Vital Signs: Temp: 99.3  F (37.4  C) Temp src: Oral BP: 94/51 Pulse: 72   Resp: 20 SpO2: 97 % O2 Device: None (Room air)    Weight: 244 lbs .79 oz    General Appearance: Awake, alert, in no acute distress  Respiratory: CTAB, no wheeze  Cardiovascular: RRR   GI: soft, nontender, obese, normal bowel sounds  Skin: no jaundice, right leg is wrapped      Medical Decision Making       45 MINUTES SPENT BY ME on the date of service doing chart review, history, exam, documentation & further activities per the note.      Data     I " have personally reviewed the following data over the past 24 hrs:    8.9  \   7.9 (L)   / 357     133 (L) 104 18.0 /  105 (H)   4.2 22 1.02 (H) \       Imaging results reviewed over the past 24 hrs:   No results found for this or any previous visit (from the past 24 hours).

## 2025-01-14 NOTE — PLAN OF CARE
Goal Outcome Evaluation:         Problem: Adult Inpatient Plan of Care  Goal: Absence of Hospital-Acquired Illness or Injury  Intervention: Prevent Skin Injury  Recent Flowsheet Documentation  Taken 1/13/2025 2030 by Chapito Hummel RN  Body Position:   position changed independently   legs elevated  Taken 1/13/2025 1600 by Chapito Hummel RN  Body Position:   position changed independently   legs elevated     Problem: Pain Acute  Goal: Optimal Pain Control and Function  Intervention: Prevent or Manage Pain  Recent Flowsheet Documentation  Taken 1/13/2025 2030 by Chapito Hummel RN  Medication Review/Management: medications reviewed  Taken 1/13/2025 1600 by Chapito Hummel RN  Medication Review/Management: medications reviewed     Patient is A/Ox4, on RA, and assist x1 with walker. Given PRN dilaudid for RLE pain. Purewick in place.     Chapito Hummel RN

## 2025-01-14 NOTE — PLAN OF CARE
Problem: Pain Acute  Goal: Optimal Pain Control and Function  Outcome: Progressing     Problem: Adult Inpatient Plan of Care  Goal: Optimal Comfort and Wellbeing  Outcome: Progressing   Goal Outcome Evaluation:      Plan of Care Reviewed With: patient    Overall Patient Progress: improvingOverall Patient Progress: improving    Pt is A&Ox4, on RA, NSR, and assistx1 with a walker and a gait belt. Contact precautions in place. Wound care completed.     Pt c/o itchy rash on her back that started the morning after she arrived to the hospital 1/13 , notified Dr. Mckeon.     Pt c/o IV on left hand was leaking, removed IV. SWAT RN called for a new IV using ultrasound, new IV infiltrated, MD and pharmacy aware, heat pack applied. Dr. Mckeon states she will place PICC order.     Pt made med-surg this shift.     Kae Newell RN

## 2025-01-14 NOTE — PROGRESS NOTES
"[facility] Progress Note    SUBJECTIVE:  Sleeping in her room with white noise on.  I didn't wake her.       REVIEW OF SYSTEMS:  Negative unless as listed above.  Social history, Family history, Medications: reviewed.    OBJECTIVE:  /58   Pulse 77   Temp 98.6  F (37  C) (Oral)   Resp 20   Ht 1.549 m (5' 1\")   Wt 110.7 kg (244 lb 0.8 oz)   SpO2 95%   BMI 46.11 kg/m                PHYSICAL EXAM:  Defer for sleeping    Antibiotics:V/M    Pertinent labs:    Recent Labs   Lab Test 01/14/25  0434 01/12/25  1104 10/14/24  1543   WBC 8.9 17.1* 9.0   HGB 7.9* 10.1* 10.5*   HCT 26.6* 33.4* 35.4   MCV 81 81 87    477* 495*       Lab Results   Component Value Date    RBC 3.27 01/14/2025     Lab Results   Component Value Date    HGB 7.9 01/14/2025     Lab Results   Component Value Date    HCT 26.6 01/14/2025     No components found for: \"MCT\"  Lab Results   Component Value Date    MCV 81 01/14/2025     Lab Results   Component Value Date    MCH 24.2 01/14/2025     Lab Results   Component Value Date    MCHC 29.7 01/14/2025     Lab Results   Component Value Date    RDW 16.6 01/14/2025     Lab Results   Component Value Date     01/14/2025       Last Comprehensive Metabolic Panel:  Sodium   Date Value Ref Range Status   01/14/2025 133 (L) 135 - 145 mmol/L Final     Potassium   Date Value Ref Range Status   01/14/2025 4.2 3.4 - 5.3 mmol/L Final   06/24/2022 4.4 3.5 - 5.0 mmol/L Final     Chloride   Date Value Ref Range Status   01/14/2025 104 98 - 107 mmol/L Final   06/24/2022 100 98 - 107 mmol/L Final     Carbon Dioxide (CO2)   Date Value Ref Range Status   01/14/2025 22 22 - 29 mmol/L Final   06/24/2022 25 22 - 31 mmol/L Final     Anion Gap   Date Value Ref Range Status   01/14/2025 7 7 - 15 mmol/L Final   06/24/2022 14 5 - 18 mmol/L Final     Glucose   Date Value Ref Range Status   01/14/2025 105 (H) 70 - 99 mg/dL Final   06/24/2022 95 70 - 125 mg/dL Final     GLUCOSE BY METER POCT   Date " Value Ref Range Status   09/07/2024 103 (H) 70 - 99 mg/dL Final     Urea Nitrogen   Date Value Ref Range Status   01/14/2025 18.0 8.0 - 23.0 mg/dL Final   06/24/2022 16 8 - 28 mg/dL Final     Creatinine   Date Value Ref Range Status   01/14/2025 1.02 (H) 0.51 - 0.95 mg/dL Final     GFR Estimate   Date Value Ref Range Status   01/14/2025 56 (L) >60 mL/min/1.73m2 Final     Comment:     eGFR calculated using 2021 CKD-EPI equation.   09/26/2020 >60 >60 mL/min/1.73m2 Final     Calcium   Date Value Ref Range Status   01/14/2025 8.9 8.8 - 10.4 mg/dL Final     Comment:     Reference intervals for this test were updated on 7/16/2024 to reflect our healthy population more accurately. There may be differences in the flagging of prior results with similar values performed with this method. Those prior results can be interpreted in the context of the updated reference intervals.       Liver Function Studies -   Recent Labs   Lab Test 01/12/25  1104   PROTTOTAL 6.9   ALBUMIN 3.2*   BILITOTAL 0.2   ALKPHOS 161*   AST 21   ALT 19       Erythrocyte Sedimentation Rate   Date Value Ref Range Status   01/12/2025 74 (H) 0 - 30 mm/hr Final       CRP   Date Value Ref Range Status   10/11/2021 2.8 (H) 0.0-<0.8 mg/dL Final     C-Reactive Protein High Sensitivity   Date Value Ref Range Status   10/20/2021 31.7 (H) 0.0 - 3.0 mg/L Final     Comment:     Low risk < 1.0 mg/L  Average risk 1.0 to 3.0 mg/L  High risk > 3.0 mg/L  Acute inflamation > 10.0 mg/L               MICROBIOLOGY DATA:        IMAGING/RADIOLOGY:          ASSESSMENT:  Stasis  Cellulitis (maybe?)  RECOMMENDATION:  On abx  I'll look at wounds tomorrow during rounds  SUMA Macias MD  Office [phone] option 2 to desk staff

## 2025-01-14 NOTE — CONSULTS
Care Management Initial Consult    General Information  Assessment completed with: Patient, patient  Type of CM/SW Visit: Initial Assessment    Primary Care Provider verified and updated as needed: Yes   Readmission within the last 30 days:           Advance Care Planning:            Communication Assessment  Patient's communication style: spoken language (English or Bilingual)    Hearing Difficulty or Deaf: no   Wear Glasses or Blind: yes    Cognitive  Cognitive/Neuro/Behavioral: WDL                      Living Environment:   People in home: significant other     Current living Arrangements: house      Able to return to prior arrangements: yes       Family/Social Support:  Care provided by:    Provides care for: no one  Marital Status: Lives with Significant Other  Support system: Significant Other        (Jaleel)  Description of Support System: Supportive    Support Assessment: Adequate family and caregiver support    Current Resources:   Patient receiving home care services: Yes  Skilled Home Care Services: Skilled Nursing     Community Resources: Home Care  Equipment currently used at home: cane, straight, walker, standard  Supplies currently used at home:      Employment/Financial:  Employment Status: retired        Financial Concerns: none   Referral to Financial Worker: No       Does the patient's insurance plan have a 3 day qualifying hospital stay waiver?  No    Lifestyle & Psychosocial Needs:  Social Drivers of Health     Food Insecurity: Low Risk  (1/12/2025)    Food Insecurity     Within the past 12 months, did you worry that your food would run out before you got money to buy more?: No     Within the past 12 months, did the food you bought just not last and you didn t have money to get more?: No   Depression: Not on file   Housing Stability: Low Risk  (1/12/2025)    Housing Stability     Do you have housing? : Yes     Are you worried about losing your housing?: No   Tobacco Use: Medium Risk (10/21/2024)     Patient History     Smoking Tobacco Use: Former     Smokeless Tobacco Use: Never     Passive Exposure: Not on file   Financial Resource Strain: Low Risk  (1/12/2025)    Financial Resource Strain     Within the past 12 months, have you or your family members you live with been unable to get utilities (heat, electricity) when it was really needed?: No   Alcohol Use: Not on file   Transportation Needs: Low Risk  (1/12/2025)    Transportation Needs     Within the past 12 months, has lack of transportation kept you from medical appointments, getting your medicines, non-medical meetings or appointments, work, or from getting things that you need?: No   Physical Activity: Not on file   Interpersonal Safety: Low Risk  (1/12/2025)    Interpersonal Safety     Do you feel physically and emotionally safe where you currently live?: Yes     Within the past 12 months, have you been hit, slapped, kicked or otherwise physically hurt by someone?: No     Within the past 12 months, have you been humiliated or emotionally abused in other ways by your partner or ex-partner?: No   Stress: Not on file   Social Connections: Not on file   Health Literacy: Not on file       Functional Status:  Prior to admission patient needed assistance:   Dependent ADLs:: Independent, Ambulation-no assistive device  Dependent IADLs:: Independent  Assesssment of Functional Status: Not at baseline with ADL Functioning, Not at baseline with mobility    Mental Health Status:  Mental Health Status: No Current Concerns       Chemical Dependency Status:  Chemical Dependency Status: No Current Concerns             Values/Beliefs:  Spiritual, Cultural Beliefs, Moravian Practices, Values that affect care: no               Discussed  Partnership in Safe Discharge Planning  document with patient/family: No    Additional Information:  SW met with patient at bedside to complete initial assessment, introduce self, and CM role.    Patient lives in a house with 4-5  steps to enter. She lives with her long time significant other, Jaleel. Pt does not use an assistive device inside her home, however has a walker and a cane for use outside the home. Pt is open to [facility] for wound care. TCU is currently recommended, patient reports she does not want to go to TCU and goal is to go home with resumption of home care at discharge.   [facility] updated on admission via epic.     Next Steps: medical progression, CM will follow     RADHA Pinzon

## 2025-01-15 LAB
ANION GAP SERPL CALCULATED.3IONS-SCNC: 8 MMOL/L (ref 7–15)
BUN SERPL-MCNC: 14.2 MG/DL (ref 8–23)
CALCIUM SERPL-MCNC: 8.7 MG/DL (ref 8.8–10.4)
CHLORIDE SERPL-SCNC: 104 MMOL/L (ref 98–107)
CREAT SERPL-MCNC: 0.94 MG/DL (ref 0.51–0.95)
EGFRCR SERPLBLD CKD-EPI 2021: 62 ML/MIN/1.73M2
ERYTHROCYTE [DISTWIDTH] IN BLOOD BY AUTOMATED COUNT: 16.6 % (ref 10–15)
GLUCOSE SERPL-MCNC: 96 MG/DL (ref 70–99)
HCO3 SERPL-SCNC: 25 MMOL/L (ref 22–29)
HCT VFR BLD AUTO: 27.2 % (ref 35–47)
HGB BLD-MCNC: 8 G/DL (ref 11.7–15.7)
MCH RBC QN AUTO: 24.2 PG (ref 26.5–33)
MCHC RBC AUTO-ENTMCNC: 29.4 G/DL (ref 31.5–36.5)
MCV RBC AUTO: 82 FL (ref 78–100)
PLATELET # BLD AUTO: 380 10E3/UL (ref 150–450)
POTASSIUM SERPL-SCNC: 4.2 MMOL/L (ref 3.4–5.3)
RBC # BLD AUTO: 3.31 10E6/UL (ref 3.8–5.2)
SODIUM SERPL-SCNC: 137 MMOL/L (ref 135–145)
VANCOMYCIN SERPL-MCNC: 11.7 UG/ML
WBC # BLD AUTO: 8.7 10E3/UL (ref 4–11)

## 2025-01-15 PROCEDURE — 250N000011 HC RX IP 250 OP 636: Performed by: INTERNAL MEDICINE

## 2025-01-15 PROCEDURE — 250N000013 HC RX MED GY IP 250 OP 250 PS 637: Performed by: INTERNAL MEDICINE

## 2025-01-15 PROCEDURE — 99232 SBSQ HOSP IP/OBS MODERATE 35: CPT | Performed by: INTERNAL MEDICINE

## 2025-01-15 PROCEDURE — 80202 ASSAY OF VANCOMYCIN: CPT | Performed by: INTERNAL MEDICINE

## 2025-01-15 PROCEDURE — 80048 BASIC METABOLIC PNL TOTAL CA: CPT | Performed by: INTERNAL MEDICINE

## 2025-01-15 PROCEDURE — 84520 ASSAY OF UREA NITROGEN: CPT | Performed by: INTERNAL MEDICINE

## 2025-01-15 PROCEDURE — 85014 HEMATOCRIT: CPT | Performed by: INTERNAL MEDICINE

## 2025-01-15 PROCEDURE — 99233 SBSQ HOSP IP/OBS HIGH 50: CPT | Performed by: INTERNAL MEDICINE

## 2025-01-15 PROCEDURE — 258N000003 HC RX IP 258 OP 636: Performed by: INTERNAL MEDICINE

## 2025-01-15 PROCEDURE — 120N000004 HC R&B MS OVERFLOW

## 2025-01-15 RX ORDER — OXYCODONE HYDROCHLORIDE 5 MG/1
10 TABLET ORAL EVERY 4 HOURS PRN
Status: DISCONTINUED | OUTPATIENT
Start: 2025-01-15 | End: 2025-01-18 | Stop reason: HOSPADM

## 2025-01-15 RX ORDER — HYDROMORPHONE HYDROCHLORIDE 1 MG/ML
0.5 INJECTION, SOLUTION INTRAMUSCULAR; INTRAVENOUS; SUBCUTANEOUS
Status: DISCONTINUED | OUTPATIENT
Start: 2025-01-15 | End: 2025-01-18 | Stop reason: HOSPADM

## 2025-01-15 RX ADMIN — HYDROCORTISONE: 1 CREAM TOPICAL at 21:33

## 2025-01-15 RX ADMIN — ROPINIROLE HYDROCHLORIDE 0.5 MG: 0.25 TABLET, FILM COATED ORAL at 08:13

## 2025-01-15 RX ADMIN — ACETAMINOPHEN 975 MG: 325 TABLET ORAL at 21:31

## 2025-01-15 RX ADMIN — SPIRONOLACTONE 25 MG: 25 TABLET, FILM COATED ORAL at 08:12

## 2025-01-15 RX ADMIN — GABAPENTIN 100 MG: 100 CAPSULE ORAL at 08:16

## 2025-01-15 RX ADMIN — MEROPENEM 1 G: 1 INJECTION, POWDER, FOR SOLUTION INTRAVENOUS at 01:59

## 2025-01-15 RX ADMIN — ROPINIROLE HYDROCHLORIDE 1 MG: 1 TABLET, FILM COATED ORAL at 21:31

## 2025-01-15 RX ADMIN — HYDROMORPHONE HYDROCHLORIDE 0.5 MG: 1 INJECTION, SOLUTION INTRAMUSCULAR; INTRAVENOUS; SUBCUTANEOUS at 15:05

## 2025-01-15 RX ADMIN — MAGNESIUM OXIDE TAB 400 MG (241.3 MG ELEMENTAL MG) 400 MG: 400 (241.3 MG) TAB at 08:11

## 2025-01-15 RX ADMIN — OXYCODONE HYDROCHLORIDE 5 MG: 5 TABLET ORAL at 01:59

## 2025-01-15 RX ADMIN — MICONAZOLE NITRATE: 20 POWDER TOPICAL at 21:33

## 2025-01-15 RX ADMIN — BUPROPION HYDROCHLORIDE 150 MG: 150 TABLET, FILM COATED, EXTENDED RELEASE ORAL at 08:13

## 2025-01-15 RX ADMIN — MICONAZOLE NITRATE: 20 POWDER TOPICAL at 08:13

## 2025-01-15 RX ADMIN — POTASSIUM CHLORIDE 20 MEQ: 750 TABLET, EXTENDED RELEASE ORAL at 21:31

## 2025-01-15 RX ADMIN — CITALOPRAM 40 MG: 40 TABLET ORAL at 11:52

## 2025-01-15 RX ADMIN — SODIUM CHLORIDE 1500 MG: 9 INJECTION, SOLUTION INTRAVENOUS at 10:10

## 2025-01-15 RX ADMIN — HYDROMORPHONE HYDROCHLORIDE 0.5 MG: 1 INJECTION, SOLUTION INTRAMUSCULAR; INTRAVENOUS; SUBCUTANEOUS at 23:32

## 2025-01-15 RX ADMIN — FUROSEMIDE 20 MG: 20 TABLET ORAL at 08:11

## 2025-01-15 RX ADMIN — ENOXAPARIN SODIUM 40 MG: 40 INJECTION SUBCUTANEOUS at 08:12

## 2025-01-15 RX ADMIN — ACETAMINOPHEN 975 MG: 325 TABLET ORAL at 15:05

## 2025-01-15 RX ADMIN — ACETAMINOPHEN 975 MG: 325 TABLET ORAL at 08:11

## 2025-01-15 RX ADMIN — HYDROCORTISONE: 1 CREAM TOPICAL at 08:14

## 2025-01-15 RX ADMIN — POTASSIUM CHLORIDE 20 MEQ: 750 TABLET, EXTENDED RELEASE ORAL at 08:11

## 2025-01-15 RX ADMIN — MEROPENEM 1 G: 1 INJECTION, POWDER, FOR SOLUTION INTRAVENOUS at 17:05

## 2025-01-15 RX ADMIN — PANTOPRAZOLE SODIUM 40 MG: 20 TABLET, DELAYED RELEASE ORAL at 08:12

## 2025-01-15 RX ADMIN — GABAPENTIN 100 MG: 100 CAPSULE ORAL at 15:05

## 2025-01-15 RX ADMIN — ENOXAPARIN SODIUM 40 MG: 40 INJECTION SUBCUTANEOUS at 21:32

## 2025-01-15 RX ADMIN — GABAPENTIN 100 MG: 100 CAPSULE ORAL at 21:31

## 2025-01-15 RX ADMIN — MEROPENEM 1 G: 1 INJECTION, POWDER, FOR SOLUTION INTRAVENOUS at 10:10

## 2025-01-15 RX ADMIN — ASPIRIN 81 MG CHEWABLE TABLET 81 MG: 81 TABLET CHEWABLE at 08:11

## 2025-01-15 RX ADMIN — OXYCODONE HYDROCHLORIDE 10 MG: 5 TABLET ORAL at 13:02

## 2025-01-15 RX ADMIN — SIMVASTATIN 40 MG: 40 TABLET, FILM COATED ORAL at 21:32

## 2025-01-15 ASSESSMENT — ACTIVITIES OF DAILY LIVING (ADL)
ADLS_ACUITY_SCORE: 41
ADLS_ACUITY_SCORE: 44
ADLS_ACUITY_SCORE: 44
ADLS_ACUITY_SCORE: 41
ADLS_ACUITY_SCORE: 41
ADLS_ACUITY_SCORE: 44
ADLS_ACUITY_SCORE: 41
ADLS_ACUITY_SCORE: 41
ADLS_ACUITY_SCORE: 44
ADLS_ACUITY_SCORE: 44
ADLS_ACUITY_SCORE: 41
ADLS_ACUITY_SCORE: 44
ADLS_ACUITY_SCORE: 44

## 2025-01-15 NOTE — PROGRESS NOTES
[facility]    Medicine Progress Note - Hospitalist Service    Date of Admission:  1/12/2025    Assessment & Plan   Elizabeth Kelley is a 79 year old female with chronic right leg lymphedema and venous stasis admitted on 1/12/2025 for recurrent right lower leg cellulitis and sepsis.     Right lower extremity cellulitis, Sepsis  -Presents with right lower leg increased erythema, swelling and pain for one week.  -Has fever, leukocytosis, and tachycardia. Meets criteria for sepsis.  -Previous wound culture grew Pseudomonas, MSSA, Proteus, and Stenotrophomonas   -History of chronic venous stasis ulcer limited to the skin and lymphedema.  -Empirically start Meropenem and Vancomycin, per ID discharged on Friday on doxycycline 100 mg twice daily for 1 week then  minocin 50 mg daily for life  -Continue wound care per [facility] RN  -Asked OT to see for lymphedema treatment  -Encourage ambulation, patient is hoping to discharge home versus TCU    IV Infiltration  -Right arm IV infiltrated with vancomycin 1/14  -Apply warm compress  -She is a difficult stick and has had 2 IVs fail  -PICC line ordered for access    Fungal groin rash  -Keep the area dry  -Miconazole twice daily    Rash on back-likely contact dermatitis  -Started on hydrocortisone cream    Elevated troponin- demand ischemia in th setting of sepsis  -Troponin 91 > 149 > 187 > 176 > 177 > 139  -Patient reports no chest pain and SOB  -EKG shows sinus tachycardia.  -Per ER discussion with cardiology, likely due to demand ischemia from sepsis. Hold heparin drip. Continue to trend troponin. If it continues to trend up, low threshold to start heparin drip.  -Echo 1/13: LVEF 55 to 60%, normal RV size and function, very mild pulmonary hypertension, no significant valvular abnormalities    Acute hypoxic respiratory failure- resolved  -Briefly needed supplemental oxygen at 2 LPM and now no longer needed. CT chest shows no PE and focal infiltrate. Likely due  "to decreased breathing while taking pain medication.   -Continue to monitor    Chronic bilateral lower extremity lymphedema  -Continue PTA lasix and spironolactone    Restless leg syndrome  -Continue PTA ropinirole    Anxiety and depression  -Continue PTA Wellbutrin and Celexa          Diet: Regular Diet Adult    DVT Prophylaxis: Enoxaparin (Lovenox) SQ  Amato Catheter: Not present  Lines: PRESENT      PICC 01/14/25 Single Lumen Left Basilic Long Term access-Site Assessment: WDL      Cardiac Monitoring: None  Code Status: Full Code      Clinically Significant Risk Factors         # Hyponatremia: Lowest Na = 133 mmol/L in last 2 days, will monitor as appropriate       # Hypoalbuminemia: Lowest albumin = 3.2 g/dL at 1/12/2025 11:04 AM, will monitor as appropriate     # Hypertension: Noted on problem list            # Severe Obesity: Estimated body mass index is 46.9 kg/m  as calculated from the following:    Height as of this encounter: 1.549 m (5' 1\").    Weight as of this encounter: 112.6 kg (248 lb 3.8 oz)., PRESENT ON ADMISSION     # Financial/Environmental Concerns: none         Social Drivers of Health    Tobacco Use: Medium Risk (10/21/2024)    Patient History     Smoking Tobacco Use: Former     Smokeless Tobacco Use: Never          Disposition Plan     Medically Ready for Discharge: Anticipated in 2-4 Days             Christie Maldonado MD  Hospitalist Service  [facility] [place]  Securely message with [employer] (more info)  Text page via [employer] Paging/Directory   ______________________________________________________________________    Interval History   No recurrent fever  Right leg pain is 2 or 3 out of 10 at this time but goes up to 10 out of 10 at times.  Complains of very itchy groin area.  Noticed loose stool but no real diarrhea or vomiting.  Denies having any chest pain  Getting up and ambulating some in the room.    Physical Exam   Vital Signs: Temp: 98.6  F (37  C) Temp src: Oral BP: " 109/55 Pulse: 75   Resp: 20 SpO2: 95 % O2 Device: None (Room air)    Weight: 248 lbs 3.81 oz    General Appearance: No acute distress, laying in bed  Respiratory: Respirations unlabored, lungs are clear anteriorly  Cardiovascular: Regular rate and rhythm.  Normal S1-S2  GI: Abdomen soft, nontender, bowel sounds present  Skin: Photos of the right lower extremity are in the chart, did see the legs before dressing change   Other: Alert and nonfocal    Medical Decision Making       55 MINUTES SPENT BY ME on the date of service doing chart review, history, exam, documentation & further activities per the note.  MANAGEMENT DISCUSSED with the following over the past 24 hours: Patient, nursing staff, daughter at bedside       Data     I have personally reviewed the following data over the past 24 hrs:    8.7  \   8.0 (L)   / 380     137 104 14.2 /  96   4.2 25 0.94 \       Imaging results reviewed over the past 24 hrs:   No results found for this or any previous visit (from the past 24 hours).

## 2025-01-15 NOTE — PROGRESS NOTES
"[facility] Progress Note    SUBJECTIVE:  Full exam of leg done with daughter here.  No real change albeit white count better and no longer any fever.  Faint rash on back and chest.       REVIEW OF SYSTEMS:  Negative unless as listed above.  Social history, Family history, Medications: reviewed.    OBJECTIVE:  /55 (BP Location: Right arm)   Pulse 75   Temp 98.6  F (37  C) (Oral)   Resp 20   Ht 1.549 m (5' 1\")   Wt 112.6 kg (248 lb 3.8 oz)   SpO2 95%   BMI 46.90 kg/m                PHYSICAL EXAM:  Cv nl  Lung not examined  Belly obese, soft            Antibiotics:V/M    Pertinent labs:    Recent Labs   Lab Test 01/15/25  0426 01/14/25  0434 01/12/25  1104   WBC 8.7 8.9 17.1*   HGB 8.0* 7.9* 10.1*   HCT 27.2* 26.6* 33.4*   MCV 82 81 81    357 477*       Lab Results   Component Value Date    RBC 3.27 01/14/2025     Lab Results   Component Value Date    HGB 7.9 01/14/2025     Lab Results   Component Value Date    HCT 26.6 01/14/2025     No components found for: \"MCT\"  Lab Results   Component Value Date    MCV 81 01/14/2025     Lab Results   Component Value Date    MCH 24.2 01/14/2025     Lab Results   Component Value Date    MCHC 29.7 01/14/2025     Lab Results   Component Value Date    RDW 16.6 01/14/2025     Lab Results   Component Value Date     01/14/2025       Last Comprehensive Metabolic Panel:  Sodium   Date Value Ref Range Status   01/15/2025 137 135 - 145 mmol/L Final     Potassium   Date Value Ref Range Status   01/15/2025 4.2 3.4 - 5.3 mmol/L Final   06/24/2022 4.4 3.5 - 5.0 mmol/L Final     Chloride   Date Value Ref Range Status   01/15/2025 104 98 - 107 mmol/L Final   06/24/2022 100 98 - 107 mmol/L Final     Carbon Dioxide (CO2)   Date Value Ref Range Status   01/15/2025 25 22 - 29 mmol/L Final   06/24/2022 25 22 - 31 mmol/L Final     Anion Gap   Date Value Ref Range Status   01/15/2025 8 7 - 15 mmol/L Final   06/24/2022 14 5 - 18 mmol/L Final     Glucose   Date " Value Ref Range Status   01/15/2025 96 70 - 99 mg/dL Final   06/24/2022 95 70 - 125 mg/dL Final     GLUCOSE BY METER POCT   Date Value Ref Range Status   09/07/2024 103 (H) 70 - 99 mg/dL Final     Urea Nitrogen   Date Value Ref Range Status   01/15/2025 14.2 8.0 - 23.0 mg/dL Final   06/24/2022 16 8 - 28 mg/dL Final     Creatinine   Date Value Ref Range Status   01/15/2025 0.94 0.51 - 0.95 mg/dL Final     GFR Estimate   Date Value Ref Range Status   01/15/2025 62 >60 mL/min/1.73m2 Final     Comment:     eGFR calculated using 2021 CKD-EPI equation.   09/26/2020 >60 >60 mL/min/1.73m2 Final     Calcium   Date Value Ref Range Status   01/15/2025 8.7 (L) 8.8 - 10.4 mg/dL Final     Comment:     Reference intervals for this test were updated on 7/16/2024 to reflect our healthy population more accurately. There may be differences in the flagging of prior results with similar values performed with this method. Those prior results can be interpreted in the context of the updated reference intervals.       Liver Function Studies -   Recent Labs   Lab Test 01/12/25  1104   PROTTOTAL 6.9   ALBUMIN 3.2*   BILITOTAL 0.2   ALKPHOS 161*   AST 21   ALT 19       Erythrocyte Sedimentation Rate   Date Value Ref Range Status   01/12/2025 74 (H) 0 - 30 mm/hr Final       CRP   Date Value Ref Range Status   10/11/2021 2.8 (H) 0.0-<0.8 mg/dL Final     C-Reactive Protein High Sensitivity   Date Value Ref Range Status   10/20/2021 31.7 (H) 0.0 - 3.0 mg/L Final     Comment:     Low risk < 1.0 mg/L  Average risk 1.0 to 3.0 mg/L  High risk > 3.0 mg/L  Acute inflamation > 10.0 mg/L               MICROBIOLOGY DATA:        IMAGING/RADIOLOGY:          ASSESSMENT:  Stasis  Cellulitis (maybe?) more so is hyperemia  RECOMMENDATION:  On abx not helping much  I would plan Discharge Friday with doxy 100 BID for a week, then minocin 50mg daily for life  I gently but appropriately brought up she might be the rare case where amputation BKA would make at least  a modicum of sense.  I don't think there is any modality to improve things here short of it.          SUMA Macias MD  Office [phone] option 2 to desk staff

## 2025-01-15 NOTE — PHARMACY-VANCOMYCIN DOSING SERVICE
Pharmacy Vancomycin Note  Date of Service January 15, 2025  Patient's  1945   79 year old, female    Indication: Skin and Soft Tissue Infection  Day of Therapy: 4  Current vancomycin regimen:  1000 mg IV q24h  Current vancomycin monitoring method: AUC  Current vancomycin therapeutic monitoring goal: 400-600 mg*h/L    [employer] Prediction of Current Vancomycin Regimen  Regimen: 1000 mg IV every 24 hours.  Start time: 12:03 on 01/15/2025  Exposure target: AUC24 (range)400-600 mg/L.hr   AUC24,ss: 348 mg/L.hr  Probability of AUC24 > 400: 21 %  Ctrough,ss: 7.2 mg/L  Probability of Ctrough,ss > 20: 0 %  Probability of nephrotoxicity (Lodise LOUIS ): 4 %    Current estimated CrCl = Estimated Creatinine Clearance: 58.3 mL/min (based on SCr of 0.94 mg/dL).    Creatinine for last 3 days  2025: 11:04 AM Creatinine 0.95 mg/dL  2025:  5:05 AM Creatinine 1.05 mg/dL  2025:  4:34 AM Creatinine 1.02 mg/dL  1/15/2025:  4:26 AM Creatinine 0.94 mg/dL    Recent Vancomycin Levels (past 3 days)  1/15/2025:  4:26 AM Vancomycin 11.7 ug/mL    Vancomycin IV Administrations (past 72 hours)                     vancomycin (VANCOCIN) 1,000 mg in 200 mL dextrose intermittent infusion (mg) 1,000 mg New Bag 25 1203     1,000 mg New Bag 25 0951    vancomycin (VANCOCIN) 2,000 mg in 0.9% NaCl 520 mL intermittent infusion (mg) 2,000 mg New Bag 25 1134                    Nephrotoxins and other renal medications (From now, onward)      Start     Dose/Rate Route Frequency Ordered Stop    01/15/25 1000  vancomycin (VANCOCIN) 1,500 mg in 0.9% NaCl 265 mL intermittent infusion         1,500 mg  over 90 Minutes Intravenous EVERY 24 HOURS 01/15/25 0718      25 1700  furosemide (LASIX) tablet 20 mg        Note to Pharmacy: PTA Sig:Take 20 mg by mouth daily.      20 mg Oral DAILY 25 1632                 Contrast Orders - past 72 hours (72h ago, onward)      Start     Dose/Rate Route Frequency Stop    25  1200  perflutren lipid microsphere (DEFINITY) injection SUSP          Intravenous ONCE 01/13/25 1120    01/12/25 1430  iopamidol (ISOVUE-370) solution 90 mL         90 mL Intravenous ONCE 01/12/25 1411            Interpretation of levels and current regimen:  Vancomycin level is reflective of AUC less than 400    Has serum creatinine changed greater than 50% in last 72 hours: No    Urine output:  good urine output    Renal Function: Stable    [employer] Prediction of Planned New Vancomycin Regimen  Regimen: 1500 mg IV every 24 hours.  Start time: 12:03 on 01/15/2025  Exposure target: AUC24 (range)400-600 mg/L.hr   AUC24,ss: 522 mg/L.hr  Probability of AUC24 > 400: 92 %  Ctrough,ss: 10.9 mg/L  Probability of Ctrough,ss > 20: 0 %  Probability of nephrotoxicity (Lodise LOUIS 2009): 6 %    Plan:  Increase Dose to 1500mg IV q24h  Vancomycin monitoring method: AUC  Vancomycin therapeutic monitoring goal: 400-600 mg*h/L  Pharmacy will check vancomycin levels as appropriate in 3-5 Days.  Serum creatinine levels will be ordered daily for the first week of therapy and at least twice weekly for subsequent weeks.    Romulo Morley [facility]

## 2025-01-15 NOTE — PLAN OF CARE
Problem: Pain Acute  Goal: Optimal Pain Control and Function  Outcome: Progressing  Intervention: Develop Pain Management Plan  Recent Flowsheet Documentation  Taken 1/15/2025 0241 by Aisha Giraldo RN  Pain Management Interventions: declines  Taken 1/15/2025 0159 by Aisha Giraldo RN  Pain Management Interventions: medication (see MAR)  Taken 1/14/2025 2345 by Aisha Giraldo RN  Pain Management Interventions: declines  Intervention: Prevent or Manage Pain  Recent Flowsheet Documentation  Taken 1/14/2025 2345 by Aisha Giraldo RN  Medication Review/Management: medications reviewed     Problem: Infection  Goal: Absence of Infection Signs and Symptoms  Outcome: Progressing     Problem: Gas Exchange Impaired  Goal: Optimal Gas Exchange  Outcome: Progressing  Intervention: Optimize Oxygenation and Ventilation  Recent Flowsheet Documentation  Taken 1/15/2025 0441 by Aisha Giraldo RN  Head of Bed (HOB) Positioning: HOB at 20-30 degrees  Taken 1/15/2025 0241 by Aisha Giraldo RN  Head of Bed (HOB) Positioning: HOB at 20-30 degrees  Taken 1/14/2025 2345 by Aisha Giraldo RN  Head of Bed (HOB) Positioning: HOB at 20-30 degrees     Problem: Comorbidity Management  Goal: Maintenance of Behavioral Health Symptom Control  Outcome: Progressing  Intervention: Maintain Behavioral Health Symptom Control  Recent Flowsheet Documentation  Taken 1/14/2025 2345 by Aisha Giraldo RN  Medication Review/Management: medications reviewed  Goal: Blood Pressure in Desired Range  Outcome: Progressing  Intervention: Maintain Blood Pressure Management  Recent Flowsheet Documentation  Taken 1/14/2025 2345 by Aisha Giraldo RN  Medication Review/Management: medications reviewed   Goal Outcome Evaluation:  Pt is A0x4, on RA, soft BP. Pt endorses RLE pain, prn oxycodone given, effective. Left leg dressing had a little bit of serous drainage at the beginning of shift. Dressing remains the same w/ dried  serous drainage, dressing intact.

## 2025-01-15 NOTE — PLAN OF CARE
Problem: Adult Inpatient Plan of Care  Goal: Optimal Comfort and Wellbeing  Outcome: Progressing  Intervention: Monitor Pain and Promote Comfort  Recent Flowsheet Documentation  Taken 1/14/2025 2214 by Nataly Knutson RN  Pain Management Interventions:   medication (see MAR)   pillow support provided   repositioned   rest  Taken 1/14/2025 1620 by Nataly Knutson RN  Pain Management Interventions:   medication (see MAR)   pillow support provided   repositioned   rest     Problem: Skin Injury Risk Increased  Goal: Skin Health and Integrity  Intervention: Plan: Nurse Driven Intervention: Moisture Management  Recent Flowsheet Documentation  Taken 1/14/2025 1620 by Nataly Knutson RN  Moisture Interventions: Encourage regular toileting   Goal Outcome Evaluation:      Plan of Care Reviewed With: patient    Overall Patient Progress: improvingOverall Patient Progress: improving     Pt A&Ox4, c/o pain in right lower leg, PRN dilaudid, scheduled Tylenol and gabapentin effective. IV infiltrated on right arm, heat pack applied on and off for relief. PICC placed in left upper arm, dressing is C/D/I. Redness and irritation in groin area, provider notified, order for powder obtained and applied. Rash and irritation on back, hydrocortisone cream applied and effective. Pt med-surg.    Nataly Knutson RN

## 2025-01-16 ENCOUNTER — APPOINTMENT (OUTPATIENT)
Dept: PHYSICAL THERAPY | Facility: HOSPITAL | Age: 80
DRG: 871 | End: 2025-01-16
Payer: COMMERCIAL

## 2025-01-16 ENCOUNTER — APPOINTMENT (OUTPATIENT)
Dept: OCCUPATIONAL THERAPY | Facility: HOSPITAL | Age: 80
DRG: 871 | End: 2025-01-16
Attending: INTERNAL MEDICINE
Payer: COMMERCIAL

## 2025-01-16 LAB
CREAT SERPL-MCNC: 0.86 MG/DL (ref 0.51–0.95)
EGFRCR SERPLBLD CKD-EPI 2021: 69 ML/MIN/1.73M2
SARS-COV-2 RNA RESP QL NAA+PROBE: NEGATIVE

## 2025-01-16 PROCEDURE — 97110 THERAPEUTIC EXERCISES: CPT | Mod: GP

## 2025-01-16 PROCEDURE — 250N000013 HC RX MED GY IP 250 OP 250 PS 637

## 2025-01-16 PROCEDURE — 120N000004 HC R&B MS OVERFLOW

## 2025-01-16 PROCEDURE — 250N000013 HC RX MED GY IP 250 OP 250 PS 637: Performed by: INTERNAL MEDICINE

## 2025-01-16 PROCEDURE — 87635 SARS-COV-2 COVID-19 AMP PRB: CPT | Performed by: INTERNAL MEDICINE

## 2025-01-16 PROCEDURE — 258N000003 HC RX IP 258 OP 636: Performed by: INTERNAL MEDICINE

## 2025-01-16 PROCEDURE — 97116 GAIT TRAINING THERAPY: CPT | Mod: GP

## 2025-01-16 PROCEDURE — 250N000011 HC RX IP 250 OP 636: Performed by: INTERNAL MEDICINE

## 2025-01-16 PROCEDURE — 97535 SELF CARE MNGMENT TRAINING: CPT | Mod: GO

## 2025-01-16 PROCEDURE — 82565 ASSAY OF CREATININE: CPT | Performed by: INTERNAL MEDICINE

## 2025-01-16 PROCEDURE — 999N000157 HC STATISTIC RCP TIME EA 10 MIN

## 2025-01-16 PROCEDURE — 99232 SBSQ HOSP IP/OBS MODERATE 35: CPT | Performed by: INTERNAL MEDICINE

## 2025-01-16 RX ORDER — FLUCONAZOLE 150 MG/1
150 TABLET ORAL ONCE
Status: COMPLETED | OUTPATIENT
Start: 2025-01-16 | End: 2025-01-16

## 2025-01-16 RX ORDER — ROPINIROLE 0.25 MG/1
0.5 TABLET, FILM COATED ORAL DAILY PRN
Status: DISCONTINUED | OUTPATIENT
Start: 2025-01-16 | End: 2025-01-18 | Stop reason: HOSPADM

## 2025-01-16 RX ADMIN — CITALOPRAM 40 MG: 40 TABLET ORAL at 12:22

## 2025-01-16 RX ADMIN — MEROPENEM 1 G: 1 INJECTION, POWDER, FOR SOLUTION INTRAVENOUS at 09:27

## 2025-01-16 RX ADMIN — ACETAMINOPHEN 975 MG: 325 TABLET ORAL at 09:18

## 2025-01-16 RX ADMIN — ROPINIROLE HYDROCHLORIDE 0.5 MG: 0.25 TABLET, FILM COATED ORAL at 09:18

## 2025-01-16 RX ADMIN — POTASSIUM CHLORIDE 20 MEQ: 750 TABLET, EXTENDED RELEASE ORAL at 20:10

## 2025-01-16 RX ADMIN — ENOXAPARIN SODIUM 40 MG: 40 INJECTION SUBCUTANEOUS at 20:11

## 2025-01-16 RX ADMIN — ACETAMINOPHEN 975 MG: 325 TABLET ORAL at 20:11

## 2025-01-16 RX ADMIN — GABAPENTIN 100 MG: 100 CAPSULE ORAL at 09:18

## 2025-01-16 RX ADMIN — Medication 1 LOZENGE: at 01:37

## 2025-01-16 RX ADMIN — BUPROPION HYDROCHLORIDE 150 MG: 150 TABLET, FILM COATED, EXTENDED RELEASE ORAL at 09:17

## 2025-01-16 RX ADMIN — Medication 1 LOZENGE: at 03:04

## 2025-01-16 RX ADMIN — ACETAMINOPHEN 975 MG: 325 TABLET ORAL at 13:19

## 2025-01-16 RX ADMIN — SPIRONOLACTONE 25 MG: 25 TABLET, FILM COATED ORAL at 09:18

## 2025-01-16 RX ADMIN — FLUCONAZOLE 150 MG: 150 TABLET ORAL at 18:41

## 2025-01-16 RX ADMIN — SIMVASTATIN 40 MG: 40 TABLET, FILM COATED ORAL at 20:10

## 2025-01-16 RX ADMIN — ROPINIROLE HYDROCHLORIDE 1 MG: 1 TABLET, FILM COATED ORAL at 20:09

## 2025-01-16 RX ADMIN — MEROPENEM 1 G: 1 INJECTION, POWDER, FOR SOLUTION INTRAVENOUS at 02:05

## 2025-01-16 RX ADMIN — MAGNESIUM OXIDE TAB 400 MG (241.3 MG ELEMENTAL MG) 400 MG: 400 (241.3 MG) TAB at 09:18

## 2025-01-16 RX ADMIN — ASPIRIN 81 MG CHEWABLE TABLET 81 MG: 81 TABLET CHEWABLE at 09:18

## 2025-01-16 RX ADMIN — HYDROCORTISONE: 1 CREAM TOPICAL at 20:12

## 2025-01-16 RX ADMIN — MICONAZOLE NITRATE: 20 POWDER TOPICAL at 09:17

## 2025-01-16 RX ADMIN — PANTOPRAZOLE SODIUM 40 MG: 20 TABLET, DELAYED RELEASE ORAL at 09:18

## 2025-01-16 RX ADMIN — HYDROMORPHONE HYDROCHLORIDE 0.5 MG: 1 INJECTION, SOLUTION INTRAMUSCULAR; INTRAVENOUS; SUBCUTANEOUS at 10:59

## 2025-01-16 RX ADMIN — SODIUM CHLORIDE 1500 MG: 9 INJECTION, SOLUTION INTRAVENOUS at 12:22

## 2025-01-16 RX ADMIN — Medication 3 MG: at 02:24

## 2025-01-16 RX ADMIN — POTASSIUM CHLORIDE 20 MEQ: 750 TABLET, EXTENDED RELEASE ORAL at 09:18

## 2025-01-16 RX ADMIN — Medication 1 LOZENGE: at 14:26

## 2025-01-16 RX ADMIN — ENOXAPARIN SODIUM 40 MG: 40 INJECTION SUBCUTANEOUS at 09:19

## 2025-01-16 RX ADMIN — GABAPENTIN 100 MG: 100 CAPSULE ORAL at 13:20

## 2025-01-16 RX ADMIN — GABAPENTIN 100 MG: 100 CAPSULE ORAL at 20:10

## 2025-01-16 RX ADMIN — FUROSEMIDE 20 MG: 20 TABLET ORAL at 09:18

## 2025-01-16 RX ADMIN — MEROPENEM 1 G: 1 INJECTION, POWDER, FOR SOLUTION INTRAVENOUS at 18:42

## 2025-01-16 ASSESSMENT — ACTIVITIES OF DAILY LIVING (ADL)
ADLS_ACUITY_SCORE: 45
ADLS_ACUITY_SCORE: 42
ADLS_ACUITY_SCORE: 45
ADLS_ACUITY_SCORE: 42
ADLS_ACUITY_SCORE: 42
ADLS_ACUITY_SCORE: 41
ADLS_ACUITY_SCORE: 42
ADLS_ACUITY_SCORE: 42
ADLS_ACUITY_SCORE: 41
ADLS_ACUITY_SCORE: 45
ADLS_ACUITY_SCORE: 42
ADLS_ACUITY_SCORE: 41
ADLS_ACUITY_SCORE: 42
ADLS_ACUITY_SCORE: 42
ADLS_ACUITY_SCORE: 45
ADLS_ACUITY_SCORE: 42
ADLS_ACUITY_SCORE: 42
ADLS_ACUITY_SCORE: 41

## 2025-01-16 NOTE — PLAN OF CARE
"  Problem: Adult Inpatient Plan of Care  Goal: Plan of Care Review  Description: The Plan of Care Review/Shift note should be completed every shift.  The Outcome Evaluation is a brief statement about your assessment that the patient is improving, declining, or no change.  This information will be displayed automatically on your shift  note.  Outcome: Progressing  Goal: Patient-Specific Goal (Individualized)  Description: You can add care plan individualizations to a care plan. Examples of Individualization might be:  \"Parent requests to be called daily at 9am for status\", \"I have a hard time hearing out of my right ear\", or \"Do not touch me to wake me up as it startles  me\".  Outcome: Progressing  Goal: Absence of Hospital-Acquired Illness or Injury  Outcome: Progressing  Intervention: Prevent and Manage VTE (Venous Thromboembolism) Risk  Recent Flowsheet Documentation  Taken 1/15/2025 1542 by Arnoldo Hussein RN  VTE Prevention/Management:   compression stockings on   other (see comments)  Taken 1/15/2025 1213 by Arnoldo Hussein RN  VTE Prevention/Management:   compression stockings on   other (see comments)  Taken 1/15/2025 0812 by Arnoldo Hussein RN  VTE Prevention/Management:   compression stockings on   other (see comments)  Goal: Optimal Comfort and Wellbeing  Outcome: Progressing  Goal: Readiness for Transition of Care  Outcome: Progressing     Problem: Pain Acute  Goal: Optimal Pain Control and Function  Outcome: Progressing     Problem: Infection  Goal: Absence of Infection Signs and Symptoms  Outcome: Progressing     Problem: Gas Exchange Impaired  Goal: Optimal Gas Exchange  Outcome: Progressing     Problem: Comorbidity Management  Goal: Maintenance of Behavioral Health Symptom Control  Outcome: Progressing  Goal: Blood Pressure in Desired Range  Outcome: Progressing     Problem: Skin Injury Risk Increased  Goal: Skin Health and Integrity  Outcome: Progressing  Intervention: Plan: Nurse Driven " Intervention: Moisture Management  Recent Flowsheet Documentation  Taken 1/15/2025 1542 by Arnoldo Hussein RN  Moisture Interventions: Encourage regular toileting  Taken 1/15/2025 1213 by Arnoldo Hussein RN  Moisture Interventions: Encourage regular toileting  Taken 1/15/2025 0812 by Arnoldo Hussein RN  Moisture Interventions: Encourage regular toileting  Intervention: Plan: Nurse Driven Intervention: Friction and Shear  Recent Flowsheet Documentation  Taken 1/15/2025 1542 by Arnoldo Hussein RN  Friction/Shear Interventions: HOB 30 degrees or less  Taken 1/15/2025 1213 by Arnoldo Hussein RN  Friction/Shear Interventions: HOB 30 degrees or less  Taken 1/15/2025 0812 by Arnoldo Hussein RN  Friction/Shear Interventions: HOB 30 degrees or less     Problem: Skin Injury Risk Increased  Goal: Skin Health and Integrity  Outcome: Progressing  Intervention: Plan: Nurse Driven Intervention: Moisture Management  Recent Flowsheet Documentation  Taken 1/15/2025 1542 by Arnoldo Hussein RN  Moisture Interventions: Encourage regular toileting  Taken 1/15/2025 1213 by Arnoldo Hussein RN  Moisture Interventions: Encourage regular toileting  Taken 1/15/2025 0812 by Arnoldo Hussein RN  Moisture Interventions: Encourage regular toileting  Intervention: Plan: Nurse Driven Intervention: Friction and Shear  Recent Flowsheet Documentation  Taken 1/15/2025 1542 by Arnoldo Hussein RN  Friction/Shear Interventions: HOB 30 degrees or less  Taken 1/15/2025 1213 by Arnoldo Hussein RN  Friction/Shear Interventions: HOB 30 degrees or less  Taken 1/15/2025 0812 by Arnoldo Hussein RN  Friction/Shear Interventions: HOB 30 degrees or less     Problem: Skin Injury Risk Increased  Goal: Skin Health and Integrity  Intervention: Plan: Nurse Driven Intervention: Friction and Shear  Recent Flowsheet Documentation  Taken 1/15/2025 1542 by Arnoldo Hussein RN  Friction/Shear Interventions: HOB 30 degrees or less  Taken  1/15/2025 1213 by Arnoldo Hussein RN  Friction/Shear Interventions: HOB 30 degrees or less  Taken 1/15/2025 0812 by Arnoldo Hussein RN  Friction/Shear Interventions: HOB 30 degrees or less   Goal Outcome Evaluation:  VS stable. He denies any Sob and chest pain. Pain is manageable with Prn 10 mg oxycodone given prior dressing changed, also after Leg wound dressing pt unable to tolerated pain and pt request IV dilaudid given and was effective. Pt has been walk to bathroom with assist 1.

## 2025-01-16 NOTE — PLAN OF CARE
Assumed care 1900 to 0730. A&O x 4. Assist x 1 with a walker and gait belt. C/O RLE pain. PRN Tylenol and Dilaudid given (see MAR). Room air. Patient c/o dry cough, PRN throat lozenge ordered. Melatonin given to promote sleep. Purewick in place to suction. Call light within reach, able to make needs known. Bed alarm on for safety.    Problem: Skin Injury Risk Increased  Goal: Skin Health and Integrity  Intervention: Plan: Nurse Driven Intervention: Moisture Management  Recent Flowsheet Documentation  Taken 1/16/2025 0303 by Tabby Espinosa RN  Moisture Interventions: Encourage regular toileting  Taken 1/15/2025 2332 by Tabby Espinosa RN  Moisture Interventions: Encourage regular toileting  Taken 1/15/2025 2131 by Tabby Espinosa RN  Moisture Interventions: Encourage regular toileting     Problem: Infection  Goal: Absence of Infection Signs and Symptoms  Intervention: Prevent or Manage Infection  Recent Flowsheet Documentation  Taken 1/16/2025 0303 by Tabby Espinosa RN  Isolation Precautions: contact precautions maintained  Taken 1/15/2025 2332 by Tabby Espinosa RN  Isolation Precautions: contact precautions maintained  Taken 1/15/2025 2131 by Tabby Espinosa RN  Isolation Precautions: contact precautions maintained

## 2025-01-16 NOTE — PROGRESS NOTES
[facility]    Medicine Progress Note - Hospitalist Service    Date of Admission:  1/12/2025    Assessment & Plan   Elizabeth Kelley is a 79 year old female with chronic right leg lymphedema and venous stasis admitted on 1/12/2025 for recurrent right lower leg cellulitis and sepsis.     Right lower extremity cellulitis, Sepsis  -Presents with right lower leg increased erythema, swelling and pain for one week.  -Has fever, leukocytosis, and tachycardia. Meets criteria for sepsis.  -Previous wound culture grew Pseudomonas, MSSA, Proteus, and Stenotrophomonas   -History of chronic venous stasis ulcer limited to the skin and lymphedema.  -Empirically start Meropenem and Vancomycin, per ID can be discharged on Friday on doxycycline 100 mg twice daily for 1 week then  minocin 50 mg daily for life  -Continue wound care per [facility] RN  -OT is seeing for lymphedema treatment  -Encourage ambulation    Cough and sore throat - negative for SARS-CoV2, influenza A and B, RSV on admission   Recheck COVID test   Cepacol lozenges prn     Fungal groin rash - no improvement so far   -Fluconazole 150 mg x 1  -Keep the area dry  -Miconazole twice daily    Rash on back-likely contact dermatitis  -Started on hydrocortisone cream    Elevated troponin- demand ischemia in th setting of sepsis  -Troponin 91 > 149 > 187 > 176 > 177 > 139  -Patient reports no chest pain and SOB  -EKG shows sinus tachycardia.  -Per ER discussion with cardiology, likely due to demand ischemia from sepsis. Hold heparin drip. Continue to trend troponin. If it continues to trend up, low threshold to start heparin drip.  -Echo 1/13: LVEF 55 to 60%, normal RV size and function, very mild pulmonary hypertension, no significant valvular abnormalities    Acute hypoxic respiratory failure- resolved  -Briefly needed supplemental oxygen at 2 LPM and now no longer needed. CT chest shows no PE and focal infiltrate. Likely due to decreased breathing while  "taking pain medication.   -Continue to monitor    Chronic bilateral lower extremity lymphedema  -Continue PTA lasix and spironolactone    Restless leg syndrome  -Continue PTA ropinirole    Anxiety and depression  -Continue PTA Wellbutrin and Celexa          Diet: Regular Diet Adult    DVT Prophylaxis: Enoxaparin (Lovenox) SQ  Amato Catheter: Not present  Lines: PRESENT      PICC 01/14/25 Single Lumen Left Basilic Long Term access-Site Assessment: WDL      Cardiac Monitoring: None  Code Status: Full Code      Clinically Significant Risk Factors               # Hypoalbuminemia: Lowest albumin = 3.2 g/dL at 1/12/2025 11:04 AM, will monitor as appropriate     # Hypertension: Noted on problem list            # Severe Obesity: Estimated body mass index is 46.61 kg/m  as calculated from the following:    Height as of this encounter: 1.549 m (5' 1\").    Weight as of this encounter: 111.9 kg (246 lb 11.1 oz)., PRESENT ON ADMISSION     # Financial/Environmental Concerns: none         Social Drivers of Health    Tobacco Use: Medium Risk (10/21/2024)    Patient History     Smoking Tobacco Use: Former     Smokeless Tobacco Use: Never          Disposition Plan     Medically Ready for Discharge: Anticipated Tomorrow             Christie Maldonado MD  Hospitalist Service  [facility]  Securely message with [employer] (more info)  Text page via [employer] Paging/Directory   ______________________________________________________________________    Interval History   Pain is controlled at this time  Ambulating to the bathroom   Complains of sore throat and some cough.  Feels tired, did not sleep well last night   Groins are still very itchy     Physical Exam   Vital Signs: Temp: 98.5  F (36.9  C) Temp src: Oral BP: 107/58 Pulse: 75   Resp: 18 SpO2: 95 % O2 Device: None (Room air)    Weight: 246 lbs 11.12 oz    General Appearance: NAD  Respiratory: Respirations unlabored, lungs are clear   Cardiovascular: RRR. 1+ LE " edema  Skin: Right leg is wrapped, stasis dermatitis noted   Other: Alert, nonfocal      Medical Decision Making       40 MINUTES SPENT BY ME on the date of service doing chart review, history, exam, documentation & further activities per the note.  MANAGEMENT DISCUSSED with the following over the past 24 hours: patient and RN       Data     I have personally reviewed the following data over the past 24 hrs:    N/A  \   N/A   / N/A     N/A N/A N/A /  N/A   N/A N/A 0.86 \       Imaging results reviewed over the past 24 hrs:   No results found for this or any previous visit (from the past 24 hours).

## 2025-01-16 NOTE — PROGRESS NOTES
Called to assess patient due to wheezing. Patient on room air and has upper airway wheezing. Patient did say she was coughing a lot today. Patient does not have any lung disease and does not use any breathing treatments. Currently she is not in any distress and says her breathing is fine. No respiratory therapy treatments needed at this time.

## 2025-01-17 ENCOUNTER — APPOINTMENT (OUTPATIENT)
Dept: OCCUPATIONAL THERAPY | Facility: HOSPITAL | Age: 80
DRG: 871 | End: 2025-01-17
Payer: COMMERCIAL

## 2025-01-17 ENCOUNTER — APPOINTMENT (OUTPATIENT)
Dept: PHYSICAL THERAPY | Facility: HOSPITAL | Age: 80
DRG: 871 | End: 2025-01-17
Payer: COMMERCIAL

## 2025-01-17 LAB
BACTERIA BLD CULT: NO GROWTH
BACTERIA BLD CULT: NO GROWTH
CREAT SERPL-MCNC: 0.78 MG/DL (ref 0.51–0.95)
EGFRCR SERPLBLD CKD-EPI 2021: 78 ML/MIN/1.73M2
ERYTHROCYTE [DISTWIDTH] IN BLOOD BY AUTOMATED COUNT: 16.6 % (ref 10–15)
HCT VFR BLD AUTO: 24.5 % (ref 35–47)
HGB BLD-MCNC: 7.5 G/DL (ref 11.7–15.7)
MCH RBC QN AUTO: 24.2 PG (ref 26.5–33)
MCHC RBC AUTO-ENTMCNC: 30.6 G/DL (ref 31.5–36.5)
MCV RBC AUTO: 79 FL (ref 78–100)
PLATELET # BLD AUTO: 370 10E3/UL (ref 150–450)
RBC # BLD AUTO: 3.1 10E6/UL (ref 3.8–5.2)
WBC # BLD AUTO: 7.8 10E3/UL (ref 4–11)

## 2025-01-17 PROCEDURE — 258N000003 HC RX IP 258 OP 636: Performed by: INTERNAL MEDICINE

## 2025-01-17 PROCEDURE — 99232 SBSQ HOSP IP/OBS MODERATE 35: CPT | Performed by: INTERNAL MEDICINE

## 2025-01-17 PROCEDURE — G0008 ADMIN INFLUENZA VIRUS VAC: HCPCS | Performed by: INTERNAL MEDICINE

## 2025-01-17 PROCEDURE — 97535 SELF CARE MNGMENT TRAINING: CPT | Mod: GO

## 2025-01-17 PROCEDURE — 90662 IIV NO PRSV INCREASED AG IM: CPT | Performed by: INTERNAL MEDICINE

## 2025-01-17 PROCEDURE — 250N000013 HC RX MED GY IP 250 OP 250 PS 637: Performed by: INTERNAL MEDICINE

## 2025-01-17 PROCEDURE — 250N000011 HC RX IP 250 OP 636: Performed by: INTERNAL MEDICINE

## 2025-01-17 PROCEDURE — 85041 AUTOMATED RBC COUNT: CPT | Performed by: INTERNAL MEDICINE

## 2025-01-17 PROCEDURE — 82565 ASSAY OF CREATININE: CPT | Performed by: INTERNAL MEDICINE

## 2025-01-17 PROCEDURE — 97530 THERAPEUTIC ACTIVITIES: CPT | Mod: GP

## 2025-01-17 PROCEDURE — 85018 HEMOGLOBIN: CPT | Performed by: INTERNAL MEDICINE

## 2025-01-17 PROCEDURE — 97116 GAIT TRAINING THERAPY: CPT | Mod: GP

## 2025-01-17 PROCEDURE — 120N000004 HC R&B MS OVERFLOW

## 2025-01-17 RX ORDER — DOXYCYCLINE 100 MG/1
100 CAPSULE ORAL EVERY 12 HOURS SCHEDULED
Status: DISCONTINUED | OUTPATIENT
Start: 2025-01-17 | End: 2025-01-18 | Stop reason: HOSPADM

## 2025-01-17 RX ORDER — MINOCYCLINE HYDROCHLORIDE 50 MG/1
50 TABLET ORAL DAILY
Qty: 90 TABLET | Refills: 3 | Status: SHIPPED | OUTPATIENT
Start: 2025-01-17

## 2025-01-17 RX ORDER — DOXYCYCLINE 100 MG/1
100 CAPSULE ORAL 2 TIMES DAILY
Qty: 14 CAPSULE | Refills: 0 | Status: SHIPPED | OUTPATIENT
Start: 2025-01-17 | End: 2025-01-24

## 2025-01-17 RX ADMIN — INFLUENZA A VIRUS A/VICTORIA/4897/2022 IVR-238 (H1N1) ANTIGEN (FORMALDEHYDE INACTIVATED), INFLUENZA A VIRUS A/CALIFORNIA/122/2022 SAN-022 (H3N2) ANTIGEN (FORMALDEHYDE INACTIVATED), AND INFLUENZA B VIRUS B/MICHIGAN/01/2021 ANTIGEN (FORMALDEHYDE INACTIVATED) 0.5 ML: 60; 60; 60 INJECTION, SUSPENSION INTRAMUSCULAR at 12:09

## 2025-01-17 RX ADMIN — ENOXAPARIN SODIUM 40 MG: 40 INJECTION SUBCUTANEOUS at 20:53

## 2025-01-17 RX ADMIN — SIMVASTATIN 40 MG: 40 TABLET, FILM COATED ORAL at 20:56

## 2025-01-17 RX ADMIN — ACETAMINOPHEN 975 MG: 325 TABLET ORAL at 13:32

## 2025-01-17 RX ADMIN — MICONAZOLE NITRATE: 20 POWDER TOPICAL at 09:53

## 2025-01-17 RX ADMIN — ENOXAPARIN SODIUM 40 MG: 40 INJECTION SUBCUTANEOUS at 09:54

## 2025-01-17 RX ADMIN — HYDROMORPHONE HYDROCHLORIDE 0.5 MG: 1 INJECTION, SOLUTION INTRAMUSCULAR; INTRAVENOUS; SUBCUTANEOUS at 09:35

## 2025-01-17 RX ADMIN — SPIRONOLACTONE 25 MG: 25 TABLET, FILM COATED ORAL at 09:54

## 2025-01-17 RX ADMIN — DOXYCYCLINE 100 MG: 100 CAPSULE ORAL at 13:32

## 2025-01-17 RX ADMIN — MEROPENEM 1 G: 1 INJECTION, POWDER, FOR SOLUTION INTRAVENOUS at 02:23

## 2025-01-17 RX ADMIN — POTASSIUM CHLORIDE 20 MEQ: 750 TABLET, EXTENDED RELEASE ORAL at 09:54

## 2025-01-17 RX ADMIN — HYDROCORTISONE: 1 CREAM TOPICAL at 09:53

## 2025-01-17 RX ADMIN — MEROPENEM 1 G: 1 INJECTION, POWDER, FOR SOLUTION INTRAVENOUS at 09:51

## 2025-01-17 RX ADMIN — SODIUM CHLORIDE 1500 MG: 9 INJECTION, SOLUTION INTRAVENOUS at 09:51

## 2025-01-17 RX ADMIN — ACETAMINOPHEN 975 MG: 325 TABLET ORAL at 09:54

## 2025-01-17 RX ADMIN — OXYCODONE HYDROCHLORIDE 10 MG: 5 TABLET ORAL at 17:26

## 2025-01-17 RX ADMIN — CITALOPRAM 40 MG: 40 TABLET ORAL at 12:09

## 2025-01-17 RX ADMIN — MICONAZOLE NITRATE: 20 POWDER TOPICAL at 20:53

## 2025-01-17 RX ADMIN — ROPINIROLE HYDROCHLORIDE 1 MG: 1 TABLET, FILM COATED ORAL at 20:54

## 2025-01-17 RX ADMIN — BUPROPION HYDROCHLORIDE 150 MG: 150 TABLET, FILM COATED, EXTENDED RELEASE ORAL at 09:54

## 2025-01-17 RX ADMIN — POTASSIUM CHLORIDE 20 MEQ: 750 TABLET, EXTENDED RELEASE ORAL at 20:53

## 2025-01-17 RX ADMIN — HYDROCORTISONE 2 APPLICATOR: 1 CREAM TOPICAL at 20:54

## 2025-01-17 RX ADMIN — ACETAMINOPHEN 975 MG: 325 TABLET ORAL at 20:53

## 2025-01-17 RX ADMIN — FUROSEMIDE 20 MG: 20 TABLET ORAL at 09:55

## 2025-01-17 RX ADMIN — ASPIRIN 81 MG CHEWABLE TABLET 81 MG: 81 TABLET CHEWABLE at 09:55

## 2025-01-17 RX ADMIN — ROPINIROLE HYDROCHLORIDE 0.5 MG: 0.25 TABLET, FILM COATED ORAL at 09:54

## 2025-01-17 RX ADMIN — MAGNESIUM OXIDE TAB 400 MG (241.3 MG ELEMENTAL MG) 400 MG: 400 (241.3 MG) TAB at 09:54

## 2025-01-17 RX ADMIN — OXYCODONE HYDROCHLORIDE 5 MG: 5 TABLET ORAL at 22:40

## 2025-01-17 RX ADMIN — GABAPENTIN 100 MG: 100 CAPSULE ORAL at 13:32

## 2025-01-17 RX ADMIN — DOXYCYCLINE 100 MG: 100 CAPSULE ORAL at 20:55

## 2025-01-17 RX ADMIN — GABAPENTIN 100 MG: 100 CAPSULE ORAL at 09:54

## 2025-01-17 RX ADMIN — GABAPENTIN 100 MG: 100 CAPSULE ORAL at 20:53

## 2025-01-17 RX ADMIN — PANTOPRAZOLE SODIUM 40 MG: 20 TABLET, DELAYED RELEASE ORAL at 09:55

## 2025-01-17 ASSESSMENT — ACTIVITIES OF DAILY LIVING (ADL)
ADLS_ACUITY_SCORE: 45

## 2025-01-17 NOTE — PLAN OF CARE
Problem: Adult Inpatient Plan of Care  Goal: Plan of Care Review  Description: The Plan of Care Review/Shift note should be completed every shift.  The Outcome Evaluation is a brief statement about your assessment that the patient is improving, declining, or no change.  This information will be displayed automatically on your shift  note.  Outcome: Progressing     Problem: Adult Inpatient Plan of Care  Goal: Absence of Hospital-Acquired Illness or Injury  Intervention: Identify and Manage Fall Risk  Recent Flowsheet Documentation  Taken 1/16/2025 1600 by Jose Quinn RN  Safety Promotion/Fall Prevention:   activity supervised   mobility aid in reach   nonskid shoes/slippers when out of bed   clutter free environment maintained   lighting adjusted   patient and family education   room near nurse's station   room organization consistent   supervised activity   safety round/check completed   toileting scheduled     Problem: Adult Inpatient Plan of Care  Goal: Optimal Comfort and Wellbeing  Outcome: Progressing   Goal Outcome Evaluation:    A & O x 4, VSS, on RA, medsurg. Right leg dressing had moderate drainage. Pain controlled with scheduled tylenol. Pt had two soft/loose BMs this evening and was concerned about possibly having contracted the norovirus because she states she feels unwell and is having similar symptoms to when she had the norovirus a few months ago. Dr. Barnhart messaged and order placed for norovirus enteric testing. Possible discharge back home tomorrow.

## 2025-01-17 NOTE — PROGRESS NOTES
Care Management Discharge Note    Discharge Date: 01/17/2025       Discharge Disposition: Home, Home Care - [facility] for RN/PT/OT/HHA    Discharge Services: Home Care - [facility] for RN/PT/OT/HHA    Discharge DME: None    Discharge Transportation:  Family/friends    Private pay costs discussed: Not applicable    Does the patient's insurance plan have a 3 day qualifying hospital stay waiver?  No    PAS Confirmation Code: NA  Patient/family educated on Medicare website which has current facility and service quality ratings: NA    Education Provided on the Discharge Plan: Yes per team  Persons Notified of Discharge Plans: Patient  Patient/Family in Agreement with the Plan: yes    Handoff Referral Completed: No, handoff not indicated or clinically appropriate    Additional Information:  Patient discharge to home with - [facility] for RN/PT/OT/HHA. Family will transport.    Orders sent to [facility] Layla Whitten RN

## 2025-01-17 NOTE — PLAN OF CARE
"  Problem: Adult Inpatient Plan of Care  Goal: Plan of Care Review  Description: The Plan of Care Review/Shift note should be completed every shift.  The Outcome Evaluation is a brief statement about your assessment that the patient is improving, declining, or no change.  This information will be displayed automatically on your shift  note.  Outcome: Progressing  Flowsheets (Taken 1/17/2025 1121)  Plan of Care Reviewed With: patient  Overall Patient Progress: improving  Goal: Patient-Specific Goal (Individualized)  Description: You can add care plan individualizations to a care plan. Examples of Individualization might be:  \"Parent requests to be called daily at 9am for status\", \"I have a hard time hearing out of my right ear\", or \"Do not touch me to wake me up as it startles  me\".  Outcome: Progressing  Goal: Absence of Hospital-Acquired Illness or Injury  Outcome: Progressing  Intervention: Identify and Manage Fall Risk  Recent Flowsheet Documentation  Taken 1/17/2025 0935 by Govind Ibarra RN  Safety Promotion/Fall Prevention:   activity supervised   assistive device/personal items within reach   clutter free environment maintained   lighting adjusted   mobility aid in reach   nonskid shoes/slippers when out of bed   patient and family education   room near nurse's station   room organization consistent   safety round/check completed   supervised activity   toileting scheduled   treat reversible contributory factors   treat underlying cause  Intervention: Prevent Infection  Recent Flowsheet Documentation  Taken 1/17/2025 0935 by Govind Ibarra RN  Infection Prevention:   cohorting utilized   hand hygiene promoted   rest/sleep promoted   single patient room provided  Goal: Optimal Comfort and Wellbeing  Outcome: Progressing  Intervention: Monitor Pain and Promote Comfort  Recent Flowsheet Documentation  Taken 1/17/2025 0935 by Govind Ibarra RN  Pain Management Interventions:   medication (see MAR)   premedicated for " activity  Goal: Readiness for Transition of Care  Outcome: Progressing     Goal Outcome Evaluation:      Plan of Care Reviewed With: patient    Overall Patient Progress: improving    Pt. A&O x4 and on room air. Pt. Had a lot of pain when changing the wound dressing. PRN Dilaudid given (see MAR). Dressing changed. Pt. Able to make needs known. Call light within  reach.

## 2025-01-17 NOTE — PLAN OF CARE
Goal Outcome Evaluation:       Pt denied any pain overnight. Pt had one soft brown stool, but unable to send sample due to stool mixed with urine. Pt able to ambulate to bathroom with 1 assist and gait belt. IV antibiotic given as ordered. R leg dressing with moderate drainage noted through dressing and onto pad. Dressing due to be changed today/daily dressing changes.     Plan to discharge today home.

## 2025-01-17 NOTE — PROGRESS NOTES
[facility]    Medicine Progress Note - Hospitalist Service    Date of Admission:  1/12/2025    Assessment & Plan   Elizabeth Kelley is a 79 year old female with chronic right leg lymphedema and venous stasis admitted on 1/12/2025 for recurrent right lower leg cellulitis and sepsis.  Cellulitis improved on IV antibiotics.  Multiple loose stool since yesterday so holding discharge today    Right lower extremity cellulitis, Sepsis  -Presents with right lower leg increased erythema, swelling and pain for one week.  -Has fever, leukocytosis, and tachycardia. Meets criteria for sepsis.  -Previous wound culture grew Pseudomonas, MSSA, Proteus, and Stenotrophomonas   -History of chronic venous stasis ulcer limited to the skin and lymphedema.  -Has Empirically been on Meropenem and Vancomycin, per ID can be discharged on doxycycline 100 mg twice daily for 1 week then  minocin 50 mg daily for life.  Will discontinue meropenem and vancomycin and start doxycycline today to make sure patient tolerates it before discharge  -Continue wound care per [facility] RN  -OT is seeing for lymphedema treatment  -Encourage ambulation    Cough and sore throat - negative for SARS-CoV2, influenza A and B, RSV on admission.  Negative for COVID 1/17  Cepacol lozenges prn     Fungal groin rash - improved after fluconazole   -Fluconazole 150 mg x 1 on 1/16  -Keep the area dry  -Miconazole twice daily    Diarrhea - likely due to antibiotics.  Has some abdominal cramping but no pain on exam, fevers or leukocytosis to suggest C. difficile  Will monitor    Rash on back-likely contact dermatitis  -Started on hydrocortisone cream    Elevated troponin- demand ischemia in th setting of sepsis  -Troponin 91 > 149 > 187 > 176 > 177 > 139  -Patient reports no chest pain and SOB  -EKG shows sinus tachycardia.  -Per ER discussion with cardiology, likely due to demand ischemia from sepsis. Hold heparin drip. Continue to trend troponin. If it  "continues to trend up, low threshold to start heparin drip.  -Echo 1/13: LVEF 55 to 60%, normal RV size and function, very mild pulmonary hypertension, no significant valvular abnormalities    Acute hypoxic respiratory failure- resolved  -Briefly needed supplemental oxygen at 2 LPM and now no longer needed. CT chest shows no PE and focal infiltrate. Likely due to decreased breathing while taking pain medication.   -Continue to monitor    Chronic bilateral lower extremity lymphedema  -Continue PTA lasix and spironolactone    Restless leg syndrome  -Continue PTA ropinirole    Anxiety and depression  -Continue PTA Wellbutrin and Celexa          Diet: Regular Diet Adult  Diet    DVT Prophylaxis: Enoxaparin (Lovenox) SQ  Amato Catheter: Not present  Lines: PRESENT      PICC 01/14/25 Single Lumen Left Basilic Long Term access-Site Assessment: WDL      Cardiac Monitoring: None  Code Status: Full Code      Clinically Significant Risk Factors               # Hypoalbuminemia: Lowest albumin = 3.2 g/dL at 1/12/2025 11:04 AM, will monitor as appropriate     # Hypertension: Noted on problem list            # Severe Obesity: Estimated body mass index is 47.95 kg/m  as calculated from the following:    Height as of this encounter: 1.549 m (5' 1\").    Weight as of this encounter: 115.1 kg (253 lb 12 oz).      # Financial/Environmental Concerns: none         Social Drivers of Health    Tobacco Use: Medium Risk (10/21/2024)    Patient History     Smoking Tobacco Use: Former     Smokeless Tobacco Use: Never          Disposition Plan     Medically Ready for Discharge: Anticipated Tomorrow             Christie Maldonado MD  Hospitalist Service  [facility]  Securely message with [employer] (more info)  Text page via [employer] Paging/Directory   ______________________________________________________________________    Interval History   Having frequent loose stool with some associated cramping, feels very fatigued this " morning.  Has not been out of bed    Physical Exam   Vital Signs: Temp: 98.9  F (37.2  C) Temp src: Oral BP: 125/56 Pulse: 78   Resp: 18 SpO2: 94 % O2 Device: None (Room air)    Weight: 253 lbs 11.99 oz    General Appearance: No acute distress, weak and fatigued  Respiratory: Respirations unlabored, lungs are clear  Cardiovascular: Regular  GI: Abdomen soft, nontender  Other: Alert, nonfocal    Medical Decision Making       47 MINUTES SPENT BY ME on the date of service doing chart review, history, exam, documentation & further activities per the note.  MANAGEMENT DISCUSSED with the following over the past 24 hours: Patient and nursing staff       Data     I have personally reviewed the following data over the past 24 hrs:    7.8  \   7.5 (L)   / 370     N/A N/A N/A /  N/A   N/A N/A 0.78 \       Imaging results reviewed over the past 24 hrs:   No results found for this or any previous visit (from the past 24 hours).

## 2025-01-18 ENCOUNTER — APPOINTMENT (OUTPATIENT)
Dept: PHYSICAL THERAPY | Facility: HOSPITAL | Age: 80
DRG: 871 | End: 2025-01-18
Payer: COMMERCIAL

## 2025-01-18 ENCOUNTER — APPOINTMENT (OUTPATIENT)
Dept: OCCUPATIONAL THERAPY | Facility: HOSPITAL | Age: 80
DRG: 871 | End: 2025-01-18
Payer: COMMERCIAL

## 2025-01-18 VITALS
SYSTOLIC BLOOD PRESSURE: 104 MMHG | RESPIRATION RATE: 20 BRPM | OXYGEN SATURATION: 94 % | DIASTOLIC BLOOD PRESSURE: 61 MMHG | WEIGHT: 256.3 LBS | BODY MASS INDEX: 48.39 KG/M2 | TEMPERATURE: 98.6 F | HEART RATE: 68 BPM | HEIGHT: 61 IN

## 2025-01-18 LAB
CREAT SERPL-MCNC: 0.87 MG/DL (ref 0.51–0.95)
EGFRCR SERPLBLD CKD-EPI 2021: 68 ML/MIN/1.73M2
HGB BLD-MCNC: 7.8 G/DL (ref 11.7–15.7)
PLATELET # BLD AUTO: 397 10E3/UL (ref 150–450)

## 2025-01-18 PROCEDURE — 85018 HEMOGLOBIN: CPT | Performed by: INTERNAL MEDICINE

## 2025-01-18 PROCEDURE — 99239 HOSP IP/OBS DSCHRG MGMT >30: CPT | Performed by: INTERNAL MEDICINE

## 2025-01-18 PROCEDURE — 250N000013 HC RX MED GY IP 250 OP 250 PS 637: Performed by: INTERNAL MEDICINE

## 2025-01-18 PROCEDURE — 85049 AUTOMATED PLATELET COUNT: CPT | Performed by: INTERNAL MEDICINE

## 2025-01-18 PROCEDURE — 97535 SELF CARE MNGMENT TRAINING: CPT | Mod: GO

## 2025-01-18 PROCEDURE — 97110 THERAPEUTIC EXERCISES: CPT | Mod: GP

## 2025-01-18 PROCEDURE — 250N000011 HC RX IP 250 OP 636: Performed by: INTERNAL MEDICINE

## 2025-01-18 PROCEDURE — 82565 ASSAY OF CREATININE: CPT | Performed by: INTERNAL MEDICINE

## 2025-01-18 PROCEDURE — 97116 GAIT TRAINING THERAPY: CPT | Mod: GP

## 2025-01-18 PROCEDURE — 97530 THERAPEUTIC ACTIVITIES: CPT | Mod: GP

## 2025-01-18 RX ADMIN — CITALOPRAM 40 MG: 40 TABLET ORAL at 13:09

## 2025-01-18 RX ADMIN — ENOXAPARIN SODIUM 40 MG: 40 INJECTION SUBCUTANEOUS at 08:22

## 2025-01-18 RX ADMIN — DOXYCYCLINE 100 MG: 100 CAPSULE ORAL at 09:36

## 2025-01-18 RX ADMIN — ROPINIROLE HYDROCHLORIDE 0.5 MG: 0.25 TABLET, FILM COATED ORAL at 08:23

## 2025-01-18 RX ADMIN — SPIRONOLACTONE 25 MG: 25 TABLET, FILM COATED ORAL at 08:23

## 2025-01-18 RX ADMIN — MAGNESIUM OXIDE TAB 400 MG (241.3 MG ELEMENTAL MG) 400 MG: 400 (241.3 MG) TAB at 08:23

## 2025-01-18 RX ADMIN — ACETAMINOPHEN 975 MG: 325 TABLET ORAL at 13:10

## 2025-01-18 RX ADMIN — PANTOPRAZOLE SODIUM 40 MG: 20 TABLET, DELAYED RELEASE ORAL at 08:22

## 2025-01-18 RX ADMIN — HYDROCORTISONE: 1 CREAM TOPICAL at 08:24

## 2025-01-18 RX ADMIN — GABAPENTIN 100 MG: 100 CAPSULE ORAL at 13:09

## 2025-01-18 RX ADMIN — FUROSEMIDE 20 MG: 20 TABLET ORAL at 08:22

## 2025-01-18 RX ADMIN — MICONAZOLE NITRATE: 20 POWDER TOPICAL at 08:21

## 2025-01-18 RX ADMIN — POTASSIUM CHLORIDE 20 MEQ: 750 TABLET, EXTENDED RELEASE ORAL at 08:21

## 2025-01-18 RX ADMIN — BUPROPION HYDROCHLORIDE 150 MG: 150 TABLET, FILM COATED, EXTENDED RELEASE ORAL at 08:22

## 2025-01-18 RX ADMIN — GABAPENTIN 100 MG: 100 CAPSULE ORAL at 08:22

## 2025-01-18 RX ADMIN — OXYCODONE HYDROCHLORIDE 10 MG: 5 TABLET ORAL at 05:47

## 2025-01-18 RX ADMIN — ACETAMINOPHEN 975 MG: 325 TABLET ORAL at 08:21

## 2025-01-18 RX ADMIN — ASPIRIN 81 MG CHEWABLE TABLET 81 MG: 81 TABLET CHEWABLE at 08:23

## 2025-01-18 RX ADMIN — HYDROMORPHONE HYDROCHLORIDE 0.5 MG: 1 INJECTION, SOLUTION INTRAMUSCULAR; INTRAVENOUS; SUBCUTANEOUS at 08:23

## 2025-01-18 ASSESSMENT — ACTIVITIES OF DAILY LIVING (ADL)
ADLS_ACUITY_SCORE: 45

## 2025-01-18 NOTE — PLAN OF CARE
Physical Therapy Discharge Summary    Reason for therapy discharge:    Discharged to home with home therapy.    Progress towards therapy goal(s). See goals on Care Plan in [facility] electronic health record for goal details.  Goals partially met.  Barriers to achieving goals:   Pt was making progress with the goals. .    Therapy recommendation(s):    Continued therapy is recommended.  Rationale/Recommendations:  To improve mobility and strength. .                           23-Oct-2021

## 2025-01-18 NOTE — PLAN OF CARE
Problem: Adult Inpatient Plan of Care  Goal: Plan of Care Review  Description: The Plan of Care Review/Shift note should be completed every shift.  The Outcome Evaluation is a brief statement about your assessment that the patient is improving, declining, or no change.  This information will be displayed automatically on your shift  note.  Outcome: Progressing     Problem: Pain Acute  Goal: Optimal Pain Control and Function  Outcome: Progressing  Intervention: Develop Pain Management Plan  Recent Flowsheet Documentation  Taken 1/17/2025 2240 by Omid Vázquez RN  Pain Management Interventions: medication (see MAR)  Intervention: Prevent or Manage Pain  Recent Flowsheet Documentation  Taken 1/18/2025 0000 by Omid Vázquez RN  Medication Review/Management: medications reviewed  Taken 1/17/2025 2100 by Omid Vázquez RN  Medication Review/Management: medications reviewed     Problem: Adult Inpatient Plan of Care  Goal: Absence of Hospital-Acquired Illness or Injury  Intervention: Prevent Infection  Recent Flowsheet Documentation  Taken 1/18/2025 0000 by Omid Vázquez RN  Infection Prevention:   cohorting utilized   hand hygiene promoted   rest/sleep promoted   single patient room provided  Taken 1/17/2025 2100 by Omid Vázquez RN  Infection Prevention:   cohorting utilized   hand hygiene promoted   rest/sleep promoted   single patient room provided   Goal Outcome Evaluation:  Pt is alert and oriented x 4, complained of pain at the beginning os shift. Assist of one with a gait belt. Pt continues to receive IV antibiotics. Leg dressing are intact. Capable of calling for help.

## 2025-01-18 NOTE — PROGRESS NOTES
No need to swab wound for Aerobic bacteria culture Routine with Gram Stain per Robel Chamberlain Bairagi. Discontinued.

## 2025-01-18 NOTE — PROGRESS NOTES
Occupational Therapy and Lymphedema Discharge Summary    Reason for therapy discharge:    Discharged to home with home therapy.    Progress towards therapy goal(s). See goals on Care Plan in [facility] electronic health record for goal details.  Goals partially met.  Barriers to achieving goals:   discharge from facility.    Therapy recommendation(s):    Continued therapy is recommended.  Rationale/Recommendations:  Home OT and lymph cares as needed.

## 2025-01-18 NOTE — PLAN OF CARE
"  Problem: Adult Inpatient Plan of Care  Goal: Plan of Care Review  Description: The Plan of Care Review/Shift note should be completed every shift.  The Outcome Evaluation is a brief statement about your assessment that the patient is improving, declining, or no change.  This information will be displayed automatically on your shift  note.  Outcome: Progressing  Flowsheets (Taken 1/18/2025 1004)  Plan of Care Reviewed With: patient  Overall Patient Progress: improving  Goal: Patient-Specific Goal (Individualized)  Description: You can add care plan individualizations to a care plan. Examples of Individualization might be:  \"Parent requests to be called daily at 9am for status\", \"I have a hard time hearing out of my right ear\", or \"Do not touch me to wake me up as it startles  me\".  Outcome: Progressing  Goal: Absence of Hospital-Acquired Illness or Injury  Outcome: Progressing  Intervention: Identify and Manage Fall Risk  Recent Flowsheet Documentation  Taken 1/18/2025 0835 by Govind Ibarra, HAROLDO  Safety Promotion/Fall Prevention:   activity supervised   assistive device/personal items within reach   clutter free environment maintained   lighting adjusted   mobility aid in reach   nonskid shoes/slippers when out of bed   patient and family education   room near nurse's station   room organization consistent   safety round/check completed   supervised activity   toileting scheduled   treat reversible contributory factors   treat underlying cause  Intervention: Prevent Infection  Recent Flowsheet Documentation  Taken 1/18/2025 0835 by Govind Ibarra, RN  Infection Prevention:   cohorting utilized   hand hygiene promoted   rest/sleep promoted   single patient room provided  Goal: Optimal Comfort and Wellbeing  Outcome: Progressing  Intervention: Monitor Pain and Promote Comfort  Recent Flowsheet Documentation  Taken 1/18/2025 0909 by Govind Ibarra, RN  Pain Management Interventions:   emotional support   medication offered but " refused   rest  Taken 1/18/2025 0835 by Govind Ibarra, RN  Pain Management Interventions: medication (see MAR)  Taken 1/18/2025 0823 by Govind Ibarra, RN  Pain Management Interventions:   medication (see MAR)   premedicated for activity  Goal: Readiness for Transition of Care  Outcome: Progressing     Goal Outcome Evaluation:      Plan of Care Reviewed With: patient    Overall Patient Progress: improving    Pt. A&O x4 and on room air. Gave PRN Dilaudid (see MAR) prior to wound care. Wound care completed. Pt. Able to make needs known. Call light within reach.     1500 - Discharge paperwork given and explained to pt. All belongings returned. All questions answered.

## 2025-01-18 NOTE — DISCHARGE SUMMARY
"[facility]  Hospitalist Discharge Summary      Date of Admission:  1/12/2025  Date of Discharge:  No discharge date for patient encounter.  Discharging Provider: Jhonatan Suh MD  Discharge Service: Hospitalist Service  Elizabeth Kelley is a 79 year old female with chronic right leg lymphedema and venous stasis admitted on 1/12/2025 for recurrent right lower leg cellulitis and sepsis.  Cellulitis improved on IV antibiotics.  Now on p.o. antibiotics    Discharge Diagnoses   Right lower extremity extremity cellulitis, history of venous stasis  Seen by ID, improving on antibiotics  Diarrhea from antibiotics resolved, no clinical evidence for C. difficile  Elevated troponin with no chest pain no acute EKG changes, per cardiology likely demand ischemia, echo no wall motion changes  Acute respiratory failure resolved  Bilateral chronic lower extremity lymphedema  Restless leg syndrome  Anxiety and depression  Chronic anemia, Hgb stable today prior to discharge, PCP follow-up, no evidence of bleeding noted    Clinically Significant Risk Factors     # Severe Obesity: Estimated body mass index is 48.43 kg/m  as calculated from the following:    Height as of this encounter: 1.549 m (5' 1\").    Weight as of this encounter: 116.3 kg (256 lb 4.8 oz).       Follow-ups Needed After Discharge   Follow-up Appointments       Hospital Follow-up with Existing Primary Care Provider (PCP)      Please see details below         Schedule Primary Care visit within: 7 Days           Primary care    Unresulted Labs Ordered in the Past 30 Days of this Admission       No orders found from 12/13/2024 to 1/13/2025.        These results will be followed up by primary care    Discharge Disposition   Discharged to home  Condition at discharge: Stable    Hospital Course   Elizabeth Kelley is a 79 year old female with chronic right leg lymphedema and venous stasis admitted on 1/12/2025 for recurrent right lower leg cellulitis " and sepsis.  Cellulitis improved on IV antibiotics.  Multiple loose stool since yesterday these are now resolved    Right lower extremity cellulitis, Sepsis  -Presents with right lower leg increased erythema, swelling and pain for one week.  -Has fever, leukocytosis, and tachycardia. Meets criteria for sepsis.  -Previous wound culture grew Pseudomonas, MSSA, Proteus, and Stenotrophomonas   -History of chronic venous stasis ulcer limited to the skin and lymphedema.  -Has Empirically been on Meropenem and Vancomycin, per ID can be discharged on doxycycline 100 mg twice daily for 1 week then  minocin 50 mg daily for life.   -Continue wound care per [facility] RN  -OT is seeing for lymphedema treatment  -Encourage ambulation    Chronic anemia  Hb stable today prior to discharge  Slow downtrending likely from wound dressing  No evidence for overt bleeding otherwise  PCP follow-up recommended    Cough and sore throat - negative for SARS-CoV2, influenza A and B, RSV on admission.  Negative for COVID 1/17  Cepacol lozenges prn     Fungal groin rash - improved after fluconazole   -Fluconazole 150 mg x 1 on 1/16  -Keep the area dry  -Miconazole twice daily    Diarrhea - likely due to antibiotics.  Has some abdominal cramping but no pain on exam, fevers or leukocytosis to suggest C. difficile  this is resolved    Rash on back-likely contact dermatitis  -Started on hydrocortisone cream    Elevated troponin- demand ischemia in th setting of sepsis  -Troponin 91 > 149 > 187 > 176 > 177 > 139  -Patient reports no chest pain and SOB  -EKG shows sinus tachycardia.  -Per ER discussion with cardiology, likely due to demand ischemia from sepsis.  -Echo 1/13: LVEF 55 to 60%, normal RV size and function, very mild pulmonary hypertension, no significant valvular abnormalities    Acute hypoxic respiratory failure- resolved  -Briefly needed supplemental oxygen at 2 LPM and now no longer needed. CT chest shows no PE and focal infiltrate. Likely due  to decreased breathing while taking pain medication.   -Continue to monitor    Chronic bilateral lower extremity lymphedema  -Continue PTA lasix and spironolactone    Restless leg syndrome  -Continue PTA ropinirole    Anxiety and depression  -Continue PTA Wellbutrin and Celexa    Patient discharged to home with home care, she declined TCU    Consultations This Hospital Stay   PHARMACY TO DOSE VANCO  PHARMACY TO DOSE VANCO  INFECTIOUS DISEASES IP CONSULT  WOUND OSTOMY CONTINENCE NURSE  IP CONSULT  OCCUPATIONAL THERAPY ADULT IP CONSULT  PHYSICAL THERAPY ADULT IP CONSULT  CARE MANAGEMENT / SOCIAL WORK IP CONSULT  VASCULAR ACCESS ADULT IP CONSULT  LYMPHEDEMA THERAPY IP CONSULT  CARE MANAGEMENT / SOCIAL WORK IP CONSULT    Code Status   Full Code    Time Spent on this Encounter   I, Jhonatan Suh MD, personally saw the patient today and spent greater than 30 minutes discharging this patient.       Jhonatan Suh MD  [place] [id number]  Phone: [phone]  Fax: [phone]  ______________________________________________________________________    Physical Exam   Vital Signs: Temp: 98.6  F (37  C) Temp src: Oral BP: 104/61 Pulse: 68   Resp: 20 SpO2: 94 % O2 Device: None (Room air)    Weight: 256 lbs 4.8 oz    General Appearance:  No acute distress, weak and fatigued  Respiratory: Respirations unlabored, lungs are clear  Cardiovascular: Regular  GI: Abdomen soft, nontender  Lower extremity please see ID notes for picture in epic       Primary Care Physician   Yung Herrmann    Discharge Orders      Primary Care - Care Coordination Referral      Home Care Referral      Reason for your hospital stay    Right leg cellulitis     Activity    Your activity upon discharge: activity as tolerated     Diet    Follow this diet upon discharge: Current Diet:Orders Placed This Encounter      Regular Diet Adult     Hospital Follow-up with Existing Primary Care  Provider (PCP)    Please see details below            Significant Results and Procedures   Results for orders placed or performed during the hospital encounter of 25   CT Chest Pulmonary Embolism w Contrast    Narrative    EXAM: CT CHEST PULMONARY EMBOLISM W CONTRAST  LOCATION: [facility]  DATE: 2025    INDICATION: hyopxia, sepsis  COMPARISON: CT chest 2024  TECHNIQUE: CT chest pulmonary angiogram during arterial phase injection of IV contrast. Multiplanar reformats and MIP reconstructions were performed. Dose reduction techniques were used.   CONTRAST: [id number] 90ml    FINDINGS:  ANGIOGRAM CHEST: Pulmonary arteries are normal caliber and negative for pulmonary emboli. Thoracic aorta is normal caliber and negative for dissection. No CT evidence of right heart strain.    LUNGS AND PLEURA: No consolidation or pleural effusion. Mild dependent atelectasis.    MEDIASTINUM/AXILLAE: Normal heart size. No pericardial effusion. Enlarged left supraclavicular lymph node measuring 1.3 cm short axis (series 4 image 24), stable compared to 2024. Anterior mediastinal lymph node measuring 8 mm short axis also stable.   Moderate hiatal hernia.    CORONARY ARTERY CALCIFICATION: None.    UPPER ABDOMEN: Mildly distended gallbladder with normal wall thickness partially visualized. Colonic diverticulosis.    MUSCULOSKELETAL: Spondylosis.      Impression    IMPRESSION:  1.  No evidence of pulmonary embolism.  2.  No consolidation or pleural effusion.  3.  Moderate hiatal hernia.   Echocardiogram Complete     Value    LVEF  55-60%    Narrative    [id number]  [id number]  [id number]  [id number]^LACEY^[place] [address]     Name: LELO ALVAREZ  MRN: [id number]  : 1945  Study Date: 2025 10:53 AM  Age: 79 yrs  Gender: Female  Patient Location: [facility]  Reason For Study: Abn EKG  Ordering Physician: DARVIN RAHMAN  Performed By: EDER     BSA: 2.1  m2  Height: 61 in  Weight: 246 lb  HR: 56  BP: 118/54 mmHg  ______________________________________________________________________________  Procedure  Echocardiogram with two-dimensional, color and spectral Doppler. Definity (NDC  #[id number]) given intravenously. The study was technically difficult. No  hemodynamically significant valvular abnormalities on 2D or color flow  imaging.  ______________________________________________________________________________  Interpretation Summary     The left ventricle is normal in size.  Left ventricular function is normal.The ejection fraction is 55-60%.  Normal right ventricle size and systolic function.  The left atrium is mildly dilated.  Right ventricular systolic pressure is elevated, consistent with very mild  pulmonary hypertension.  The study was technically difficult.  No hemodynamically significant valvular abnormalities on 2D or color flow  imaging.  ______________________________________________________________________________  Left Ventricle  The left ventricle is normal in size. Left ventricular function is normal.The  ejection fraction is 55-60%. There is normal left ventricular wall thickness.  Left ventricular diastolic function is normal. No regional wall motion  abnormalities noted.     Right Ventricle  Normal right ventricle size and systolic function.     Atria  The left atrium is mildly dilated. Right atrial size is normal. There is no  color Doppler evidence of an atrial shunt.     Mitral Valve  Mitral valve leaflets appear normal. There is trace mitral regurgitation.     Tricuspid Valve  The tricuspid valve is not well visualized. This degree of valvular  regurgitation is within normal limits. Right ventricular systolic pressure is  elevated, consistent with mild pulmonary hypertension.     Aortic Valve  The aortic valve is not well visualized. No aortic regurgitation is present.  No hemodynamically significant valvular aortic stenosis.     Pulmonic  Valve  The pulmonic valve is not well visualized. This degree of valvular  regurgitation is within normal limits.     Vessels  The aorta root is normal. Normal size ascending aorta. IVC diameter <2.1 cm  collapsing >50% with sniff suggests a normal RA pressure of 3 mmHg.     Pericardium  There is no pericardial effusion.     ______________________________________________________________________________  MMode/2D Measurements & Calculations  IVSd: 1.5 cm  LVIDd: 4.4 cm  LVIDs: 2.6 cm  LVPWd: 1.1 cm  FS: 40.9 %     LV mass(C)d: 214.6 grams  LV mass(C)dI: 104.0 grams/m2  Ao root diam: 2.9 cm  asc Aorta Diam: 3.2 cm  LVOT diam: 2.2 cm  LVOT area: 3.7 cm2  Ao root diam index Ht(cm/m): 1.9  Ao root diam index BSA (cm/m2): 1.4  Asc Ao diam index BSA (cm/m2): 1.6  Asc Ao diam index Ht(cm/m): 2.1  EF Biplane: 59.5 %  LA Volume (BP): 76.4 ml     LA Volume Index (BP): 37.1 ml/m2  LA Volume Indexed (AL/bp): 38.5 ml/m2  RV Base: 4.1 cm  RWT: 0.50  TAPSE: 2.7 cm     Doppler Measurements & Calculations  MV E max raj: 122.0 cm/sec  MV A max raj: 113.4 cm/sec  MV E/A: 1.1  MV dec slope: 463.8 cm/sec2  MV dec time: 0.26 sec     Ao V2 max: 161.5 cm/sec  Ao max PG: 10.0 mmHg  Ao V2 mean: 103.6 cm/sec  Ao mean P.9 mmHg  Ao V2 VTI: 33.9 cm  ALISHA(I,D): 2.9 cm2  ALISHA(V,D): 2.9 cm2  LV V1 max P.3 mmHg  LV V1 max: 125.2 cm/sec  LV V1 VTI: 26.7 cm  SV(LVOT): 98.3 ml  SI(LVOT): 47.7 ml/m2  PA V2 max: 93.7 cm/sec  PA max PG: 3.5 mmHg  PA acc time: 0.11 sec  TR max raj: 287.8 cm/sec  TR max P.1 mmHg  AV Raj Ratio (DI): 0.77  ALISHA Index (cm2/m2): 1.4  E/E' av.3  Lateral E/e': 11.1  Medial E/e': 11.6  RV S Raj: 20.0 cm/sec     ______________________________________________________________________________  Report approved by: Bev Ramirez MD on 2025 01:36 PM             Discharge Medications   Current Discharge Medication List        START taking these medications    Details   doxycycline hyclate (VIBRAMYCIN) 100 MG  capsule Take 1 capsule (100 mg) by mouth 2 times daily for 7 days.  Qty: 14 capsule, Refills: 0    Associated Diagnoses: Cellulitis of right lower leg      minocycline (DYNACIN) 50 MG tablet Take 1 tablet (50 mg) by mouth daily. Start after finishing doxycycline course  Qty: 90 tablet, Refills: 3    Associated Diagnoses: Cellulitis of right lower leg           CONTINUE these medications which have NOT CHANGED    Details   acetaminophen (TYLENOL) 500 MG tablet Take 1,000 mg by mouth daily as needed for pain.      ascorbic acid (VITAMIN C) 250 MG CHEW chewable tablet Take 250 mg by mouth daily.      aspirin (ASPIRIN 81) 81 MG chewable tablet Take 1 tablet by mouth every morning      buPROPion (WELLBUTRIN SR) 150 MG 12 hr tablet Take 150 mg by mouth every morning       Carboxymethylcellulose Sodium 0.25 % SOLN Apply 1 drop to eye daily as needed      citalopram (CELEXA) 40 MG tablet Take 40 mg by mouth daily (with lunch)      ferrous sulfate (FEROSUL) 325 (65 Fe) MG tablet Take 325 mg by mouth every other day.      furosemide (LASIX) 20 MG tablet Take 20 mg by mouth daily.      gabapentin (NEURONTIN) 100 MG capsule Take 100 mg by mouth 3 times daily      magnesium oxide (MAG-OX) 400 MG tablet Take 1 tablet by mouth every morning      multivitamin w/minerals (CENTRUM ADULTS) tablet Take 1 tablet by mouth every morning      naproxen (NAPROSYN) 500 MG tablet TAKE 1 TABLET BY MOUTH EVERY 12 HOURS WITH FOOD OR MILK AS NEEDED FOR 7 DAYS      nitroGLYcerin (NITROSTAT) 0.4 MG sublingual tablet PLACE 1 TABLET UNDER TONGUE EVERY 5 MINTUES AS NEEDED FOR CHEST PAIN FOR UP TO 30 DAYS      nystatin (MYCOSTATIN) 405385 UNIT/GM external powder Apply topically 2 times daily as needed.      oxyCODONE (ROXICODONE) 5 MG tablet Take 2.5-5 mg by mouth every 6 hours as needed for severe pain.      pantoprazole (PROTONIX) 40 MG EC tablet Take 40 mg by mouth every morning      potassium chloride ER (K-TAB) 20 MEQ CR tablet Take 20 mEq by mouth  2 times daily.      !! rOPINIRole (REQUIP) 1 MG tablet Take 0.5-1 mg by mouth daily as needed. At noon      !! rOPINIRole (REQUIP) 1 MG tablet Take 0.5 mg by mouth every morning In addition, patient takes one tablet (1 mg) in the evening      !! rOPINIRole (REQUIP) 1 MG tablet Take 1 mg by mouth every evening In addition, patient takes one half tablet (0.5 mg) in the morning      simvastatin (ZOCOR) 40 MG tablet [SIMVASTATIN (ZOCOR) 40 MG TABLET] Take 40 mg by mouth bedtime.      spironolactone (ALDACTONE) 25 MG tablet Take 25 mg by mouth every morning      vitamin B-Complex Take 1 tablet by mouth daily.      vitamin D3 (CHOLECALCIFEROL) 250 mcg (02595 units) capsule Take 1 capsule by mouth daily.      zinc 50 MG TABS Take 1 tablet by mouth three times a week. On Tues, Thur, Sat      Wound Cleansers (VASHE WOUND THERAPY EX) Externally apply topically daily as needed       !! - Potential duplicate medications found. Please discuss with provider.        Allergies   Allergies   Allergen Reactions    Other Environmental Allergy Anaphylaxis     Bees/Wasps Stings  Bees/Wasps Stings    Adhesive Tape Other (See Comments)     Red,itchy and oozes  Red,itchy and oozes    Adhesive [Cyanoacrylate] Unknown     Other reaction(s): sores    Latex Hives    Ragweeds Itching    Oxycodone-Acetaminophen Rash    Vicodin [Hydrocodone-Acetaminophen] Nausea and Vomiting and Hives

## 2025-01-20 ENCOUNTER — TELEPHONE (OUTPATIENT)
Dept: VASCULAR SURGERY | Facility: CLINIC | Age: 80
End: 2025-01-20

## 2025-01-20 NOTE — TELEPHONE ENCOUNTER
Voicemail left for Laura. Notified her to resume care now that patient is home from hospital. Patient needs daily cares, but if home care unable to perform daily cares and only 2-3 times weekly, then we advise teaching a willing and able family member or caregiver to dressings between their visits. Asked for call back to discuss plan going forward.

## 2025-01-20 NOTE — TELEPHONE ENCOUNTER
Caller: [facility]     Provider: MD Lars Torres    Detailed reason for call: Laura CIFUENTES requesting verbal orders to resume skilled nursing home visits as patient was discharged from the hospital. Would also like to discuss increased services as patient's wounds have gotten worse and may be out of home care's ability to care for.    Best phone number to contact: [phone]

## 2025-01-22 ENCOUNTER — TELEPHONE (OUTPATIENT)
Dept: WOUND CARE | Facility: CLINIC | Age: 80
End: 2025-01-22
Payer: COMMERCIAL

## 2025-01-22 NOTE — TELEPHONE ENCOUNTER
Laura with [facility] called regarding verbal orders she received from Avis Oreilly RN on Monday 1/20/25. Laura stated to the writer that [facility] is only able to see the patient 3 x/ week (MWF) but that the patient will do her wound care on the other days. Laura is asking for verification of the wound care instructions as the patient has been using zinc paste per previous orders, but that was not specifically mentioned in the verbal orders given on Monday. Laura is asking for a call back with step by step wound care instructions. Ok to leave a     Laura [phone]

## 2025-01-22 NOTE — TELEPHONE ENCOUNTER
Voicemail left for Laura notifying of most recent orders from [facility] nurse when patient recently hospitalized. VASHE soak, Xeroform to wound beds, cover with super absorbant, secure with roll gauze and tape. Writer notified home care nurse that it is okay to use thin layer of zinc oxide to any raw areas of skin and periwound. Call back number given.

## 2025-01-23 LAB
ATRIAL RATE - MUSE: 108 BPM
DIASTOLIC BLOOD PRESSURE - MUSE: 57 MMHG
INTERPRETATION ECG - MUSE: NORMAL
P AXIS - MUSE: 66 DEGREES
PR INTERVAL - MUSE: 132 MS
QRS DURATION - MUSE: 80 MS
QT - MUSE: 318 MS
QTC - MUSE: 426 MS
R AXIS - MUSE: 20 DEGREES
SYSTOLIC BLOOD PRESSURE - MUSE: 118 MMHG
T AXIS - MUSE: 76 DEGREES
VENTRICULAR RATE- MUSE: 108 BPM

## 2025-01-29 ENCOUNTER — MEDICAL CORRESPONDENCE (OUTPATIENT)
Dept: HEALTH INFORMATION MANAGEMENT | Facility: CLINIC | Age: 80
End: 2025-01-29

## 2025-02-19 NOTE — PATIENT INSTRUCTIONS
Wound Care Instructions    Right lower leg:  Cleanse with wound wash or bottled water. Use non-sterile 4x4 gauze to gently remove any loosening tissue or debris and pat dry with non-sterile gauze.     Primary Dressing: Apply Sorbact Swab over entire area of ulceration.     Secondary dressing: Cover with baby diapers with leg gussets removed.    Change daily.    Secure with Coban.    Patient can resume using lymphedema pumps daily.       Activity:  -Sleep in bed at night.  -Twice daily (1 hour between normal meal times) lay flat on couch or bed to reduce edema. Re-tighten compression before getting up.  -Perform ankle circles and pumps while lying flat to bring fluid up out of legs.  -Walk as much as tolerated, this is good for edema.    Diet:  -Avoid salt and sugar as this retains water and will worsen edema.    Bathing:  -You may shower with waterproof dressing cover.  -Do not soak ulcers.  -Do not leave open to air.     SEEK MEDICAL CARE IF:    You have an increase in swelling, pain, or redness around the wound.    You have an increase in the amount of pus coming from the wound.    There is a bad smell coming from the wound.    The wound appears to be worsening/enlarging    You have a fever greater than 101.5 F      It is ok to continue current wound care treatment/products for the next 2-3 days until new wound care supplies are ordered and arrive. If longer than this please contact our office at [phone].    Maria G Yeboah, MSN, CNP, CWOCN-AP  [facility]  [phone]    Ambulatory

## 2025-03-03 ENCOUNTER — TELEPHONE (OUTPATIENT)
Dept: VASCULAR SURGERY | Facility: CLINIC | Age: 80
End: 2025-03-03
Payer: COMMERCIAL

## 2025-03-03 NOTE — TELEPHONE ENCOUNTER
Caller: HAROLDO Sanon case manager Nivia     Provider: Dr. Lars Torres    Detailed reason for call: HC RN states wound is not progressing, and Medicare guidelines state they need to notify MD that they need to come up with a different plan of care or they will no longer cover services. RN would like to know if Dr. Torres anticipates that the wound will heal over time, or if this is a maintenance wound. If maintenance wound, HC can teach patient to do her own wound cares (pt has been doing this in between HC visits), or they need to try new wound care orders. Patient is scheduled to see Dr. Torres on Weds if this an be addressed with her at that appt as well.     Best phone number to contact: [phone]    Best time to contact: Any time    Ok to leave a detailed message: Yes    Ok to speak to authorized person if needed: No

## 2025-03-12 ENCOUNTER — HOSPITAL ENCOUNTER (INPATIENT)
Facility: HOSPITAL | Age: 80
LOS: 13 days | Discharge: HOME | End: 2025-03-25
Attending: EMERGENCY MEDICINE | Admitting: INTERNAL MEDICINE
Payer: COMMERCIAL

## 2025-03-12 ENCOUNTER — APPOINTMENT (OUTPATIENT)
Dept: ULTRASOUND IMAGING | Facility: HOSPITAL | Age: 80
End: 2025-03-12
Attending: EMERGENCY MEDICINE
Payer: COMMERCIAL

## 2025-03-12 DIAGNOSIS — N17.9 AKI (ACUTE KIDNEY INJURY): ICD-10-CM

## 2025-03-12 DIAGNOSIS — R79.89 ELEVATED PROCALCITONIN: ICD-10-CM

## 2025-03-12 DIAGNOSIS — L03.116 CELLULITIS OF LEFT LOWER EXTREMITY: ICD-10-CM

## 2025-03-12 DIAGNOSIS — D50.9 IRON DEFICIENCY ANEMIA, UNSPECIFIED IRON DEFICIENCY ANEMIA TYPE: ICD-10-CM

## 2025-03-12 DIAGNOSIS — L97.919: ICD-10-CM

## 2025-03-12 DIAGNOSIS — L03.115 CELLULITIS OF RIGHT LOWER LEG: Primary | ICD-10-CM

## 2025-03-12 DIAGNOSIS — R60.0 PERIPHERAL EDEMA: ICD-10-CM

## 2025-03-12 LAB
ALBUMIN SERPL BCG-MCNC: 2.6 G/DL (ref 3.5–5.2)
ALP SERPL-CCNC: 300 U/L (ref 40–150)
ALT SERPL W P-5'-P-CCNC: 27 U/L (ref 0–50)
ANION GAP SERPL CALCULATED.3IONS-SCNC: 10 MMOL/L (ref 7–15)
AST SERPL W P-5'-P-CCNC: 15 U/L (ref 0–45)
BASOPHILS # BLD AUTO: 0.1 10E3/UL (ref 0–0.2)
BASOPHILS NFR BLD AUTO: 0 %
BILIRUB SERPL-MCNC: <0.2 MG/DL
BUN SERPL-MCNC: 49.7 MG/DL (ref 8–23)
CALCIUM SERPL-MCNC: 9.3 MG/DL (ref 8.8–10.4)
CHLORIDE SERPL-SCNC: 105 MMOL/L (ref 98–107)
CREAT SERPL-MCNC: 1.62 MG/DL (ref 0.51–0.95)
CRP SERPL-MCNC: 425.9 MG/L
EGFRCR SERPLBLD CKD-EPI 2021: 32 ML/MIN/1.73M2
EOSINOPHIL # BLD AUTO: 0.2 10E3/UL (ref 0–0.7)
EOSINOPHIL NFR BLD AUTO: 1 %
ERYTHROCYTE [DISTWIDTH] IN BLOOD BY AUTOMATED COUNT: 16.8 % (ref 10–15)
GLUCOSE SERPL-MCNC: 116 MG/DL (ref 70–99)
HCO3 SERPL-SCNC: 23 MMOL/L (ref 22–29)
HCT VFR BLD AUTO: 25.4 % (ref 35–47)
HGB BLD-MCNC: 7.9 G/DL (ref 11.7–15.7)
HOLD SPECIMEN: NORMAL
IMM GRANULOCYTES # BLD: 0.1 10E3/UL
IMM GRANULOCYTES NFR BLD: 1 %
INR PPP: 1.11 (ref 0.85–1.15)
LACTATE SERPL-SCNC: 1.2 MMOL/L (ref 0.7–2)
LYMPHOCYTES # BLD AUTO: 1.1 10E3/UL (ref 0.8–5.3)
LYMPHOCYTES NFR BLD AUTO: 6 %
MCH RBC QN AUTO: 24.4 PG (ref 26.5–33)
MCHC RBC AUTO-ENTMCNC: 31.1 G/DL (ref 31.5–36.5)
MCV RBC AUTO: 78 FL (ref 78–100)
MONOCYTES # BLD AUTO: 1.3 10E3/UL (ref 0–1.3)
MONOCYTES NFR BLD AUTO: 7 %
NEUTROPHILS # BLD AUTO: 16.5 10E3/UL (ref 1.6–8.3)
NEUTROPHILS NFR BLD AUTO: 85 %
NRBC # BLD AUTO: 0 10E3/UL
NRBC BLD AUTO-RTO: 0 /100
NT-PROBNP SERPL-MCNC: 520 PG/ML (ref 0–1800)
PLATELET # BLD AUTO: 337 10E3/UL (ref 150–450)
POTASSIUM SERPL-SCNC: 4.1 MMOL/L (ref 3.4–5.3)
PROCALCITONIN SERPL IA-MCNC: 4.78 NG/ML
PROT SERPL-MCNC: 6.4 G/DL (ref 6.4–8.3)
RBC # BLD AUTO: 3.24 10E6/UL (ref 3.8–5.2)
SODIUM SERPL-SCNC: 138 MMOL/L (ref 135–145)
WBC # BLD AUTO: 19.3 10E3/UL (ref 4–11)

## 2025-03-12 PROCEDURE — 999N000285 HC STATISTIC VASC ACCESS LAB DRAW WITH PIV START

## 2025-03-12 PROCEDURE — 87040 BLOOD CULTURE FOR BACTERIA: CPT | Performed by: EMERGENCY MEDICINE

## 2025-03-12 PROCEDURE — 999N000248 HC STATISTIC IV INSERT WITH US BY RN

## 2025-03-12 PROCEDURE — 83605 ASSAY OF LACTIC ACID: CPT | Performed by: EMERGENCY MEDICINE

## 2025-03-12 PROCEDURE — 80053 COMPREHEN METABOLIC PANEL: CPT | Performed by: EMERGENCY MEDICINE

## 2025-03-12 PROCEDURE — 93970 EXTREMITY STUDY: CPT

## 2025-03-12 PROCEDURE — 99207 PR APP CREDIT; MD BILLING SHARED VISIT: CPT

## 2025-03-12 PROCEDURE — 258N000003 HC RX IP 258 OP 636: Performed by: EMERGENCY MEDICINE

## 2025-03-12 PROCEDURE — 96365 THER/PROPH/DIAG IV INF INIT: CPT

## 2025-03-12 PROCEDURE — 250N000011 HC RX IP 250 OP 636: Performed by: EMERGENCY MEDICINE

## 2025-03-12 PROCEDURE — 85014 HEMATOCRIT: CPT | Performed by: EMERGENCY MEDICINE

## 2025-03-12 PROCEDURE — 83880 ASSAY OF NATRIURETIC PEPTIDE: CPT | Performed by: EMERGENCY MEDICINE

## 2025-03-12 PROCEDURE — 250N000011 HC RX IP 250 OP 636

## 2025-03-12 PROCEDURE — 85025 COMPLETE CBC W/AUTO DIFF WBC: CPT | Performed by: EMERGENCY MEDICINE

## 2025-03-12 PROCEDURE — 250N000013 HC RX MED GY IP 250 OP 250 PS 637

## 2025-03-12 PROCEDURE — 99223 1ST HOSP IP/OBS HIGH 75: CPT | Mod: FS | Performed by: INTERNAL MEDICINE

## 2025-03-12 PROCEDURE — 36415 COLL VENOUS BLD VENIPUNCTURE: CPT | Performed by: EMERGENCY MEDICINE

## 2025-03-12 PROCEDURE — 96366 THER/PROPH/DIAG IV INF ADDON: CPT

## 2025-03-12 PROCEDURE — 85610 PROTHROMBIN TIME: CPT | Performed by: EMERGENCY MEDICINE

## 2025-03-12 PROCEDURE — 84145 PROCALCITONIN (PCT): CPT | Performed by: EMERGENCY MEDICINE

## 2025-03-12 PROCEDURE — 96368 THER/DIAG CONCURRENT INF: CPT

## 2025-03-12 PROCEDURE — 120N000001 HC R&B MED SURG/OB

## 2025-03-12 PROCEDURE — 99285 EMERGENCY DEPT VISIT HI MDM: CPT | Mod: 25

## 2025-03-12 PROCEDURE — 86140 C-REACTIVE PROTEIN: CPT | Performed by: EMERGENCY MEDICINE

## 2025-03-12 RX ORDER — HYDROMORPHONE HYDROCHLORIDE 2 MG/1
2 TABLET ORAL
Status: DISCONTINUED | OUTPATIENT
Start: 2025-03-12 | End: 2025-03-25 | Stop reason: HOSPADM

## 2025-03-12 RX ORDER — GABAPENTIN 100 MG/1
100 CAPSULE ORAL 3 TIMES DAILY
Status: DISCONTINUED | OUTPATIENT
Start: 2025-03-12 | End: 2025-03-19

## 2025-03-12 RX ORDER — LIDOCAINE 40 MG/G
CREAM TOPICAL
Status: DISCONTINUED | OUTPATIENT
Start: 2025-03-12 | End: 2025-03-25 | Stop reason: HOSPADM

## 2025-03-12 RX ORDER — NALOXONE HYDROCHLORIDE 0.4 MG/ML
0.2 INJECTION, SOLUTION INTRAMUSCULAR; INTRAVENOUS; SUBCUTANEOUS
Status: DISCONTINUED | OUTPATIENT
Start: 2025-03-12 | End: 2025-03-25 | Stop reason: HOSPADM

## 2025-03-12 RX ORDER — CALCIUM CARBONATE 500 MG/1
1000 TABLET, CHEWABLE ORAL 4 TIMES DAILY PRN
Status: DISCONTINUED | OUTPATIENT
Start: 2025-03-12 | End: 2025-03-25 | Stop reason: HOSPADM

## 2025-03-12 RX ORDER — ACETAMINOPHEN 650 MG/1
650 SUPPOSITORY RECTAL EVERY 4 HOURS PRN
Status: DISCONTINUED | OUTPATIENT
Start: 2025-03-12 | End: 2025-03-19

## 2025-03-12 RX ORDER — POLYETHYLENE GLYCOL 3350 17 G/17G
17 POWDER, FOR SOLUTION ORAL 2 TIMES DAILY PRN
Status: DISCONTINUED | OUTPATIENT
Start: 2025-03-12 | End: 2025-03-25 | Stop reason: HOSPADM

## 2025-03-12 RX ORDER — ONDANSETRON 2 MG/ML
4 INJECTION INTRAMUSCULAR; INTRAVENOUS EVERY 6 HOURS PRN
Status: DISCONTINUED | OUTPATIENT
Start: 2025-03-12 | End: 2025-03-25 | Stop reason: HOSPADM

## 2025-03-12 RX ORDER — NALOXONE HYDROCHLORIDE 0.4 MG/ML
0.4 INJECTION, SOLUTION INTRAMUSCULAR; INTRAVENOUS; SUBCUTANEOUS
Status: DISCONTINUED | OUTPATIENT
Start: 2025-03-12 | End: 2025-03-25 | Stop reason: HOSPADM

## 2025-03-12 RX ORDER — ONDANSETRON 4 MG/1
4 TABLET, ORALLY DISINTEGRATING ORAL EVERY 6 HOURS PRN
Status: DISCONTINUED | OUTPATIENT
Start: 2025-03-12 | End: 2025-03-25 | Stop reason: HOSPADM

## 2025-03-12 RX ORDER — HYDRALAZINE HYDROCHLORIDE 20 MG/ML
10 INJECTION INTRAMUSCULAR; INTRAVENOUS EVERY 4 HOURS PRN
Status: DISCONTINUED | OUTPATIENT
Start: 2025-03-12 | End: 2025-03-25 | Stop reason: HOSPADM

## 2025-03-12 RX ORDER — SIMVASTATIN 40 MG
40 TABLET ORAL AT BEDTIME
Status: DISCONTINUED | OUTPATIENT
Start: 2025-03-12 | End: 2025-03-25 | Stop reason: HOSPADM

## 2025-03-12 RX ORDER — AMOXICILLIN 250 MG
1 CAPSULE ORAL 2 TIMES DAILY PRN
Status: DISCONTINUED | OUTPATIENT
Start: 2025-03-12 | End: 2025-03-25 | Stop reason: HOSPADM

## 2025-03-12 RX ORDER — BUPROPION HYDROCHLORIDE 150 MG/1
150 TABLET, EXTENDED RELEASE ORAL EVERY MORNING
Status: DISCONTINUED | OUTPATIENT
Start: 2025-03-13 | End: 2025-03-25 | Stop reason: HOSPADM

## 2025-03-12 RX ORDER — SPIRONOLACTONE 25 MG/1
25 TABLET ORAL EVERY MORNING
Status: DISCONTINUED | OUTPATIENT
Start: 2025-03-13 | End: 2025-03-25 | Stop reason: HOSPADM

## 2025-03-12 RX ORDER — ASPIRIN 81 MG/1
81 TABLET, CHEWABLE ORAL EVERY MORNING
Status: DISCONTINUED | OUTPATIENT
Start: 2025-03-13 | End: 2025-03-25 | Stop reason: HOSPADM

## 2025-03-12 RX ORDER — HEPARIN SODIUM 5000 [USP'U]/.5ML
5000 INJECTION, SOLUTION INTRAVENOUS; SUBCUTANEOUS EVERY 8 HOURS
Status: DISCONTINUED | OUTPATIENT
Start: 2025-03-12 | End: 2025-03-25 | Stop reason: HOSPADM

## 2025-03-12 RX ORDER — PIPERACILLIN SODIUM, TAZOBACTAM SODIUM 3; .375 G/15ML; G/15ML
3.38 INJECTION, POWDER, LYOPHILIZED, FOR SOLUTION INTRAVENOUS ONCE
Status: COMPLETED | OUTPATIENT
Start: 2025-03-12 | End: 2025-03-12

## 2025-03-12 RX ORDER — ROPINIROLE 0.25 MG/1
0.5 TABLET, FILM COATED ORAL 2 TIMES DAILY
Status: DISCONTINUED | OUTPATIENT
Start: 2025-03-13 | End: 2025-03-25 | Stop reason: HOSPADM

## 2025-03-12 RX ORDER — ROPINIROLE 1 MG/1
1 TABLET, FILM COATED ORAL AT BEDTIME
Status: DISCONTINUED | OUTPATIENT
Start: 2025-03-12 | End: 2025-03-25 | Stop reason: HOSPADM

## 2025-03-12 RX ORDER — AMOXICILLIN 250 MG
2 CAPSULE ORAL 2 TIMES DAILY PRN
Status: DISCONTINUED | OUTPATIENT
Start: 2025-03-12 | End: 2025-03-25 | Stop reason: HOSPADM

## 2025-03-12 RX ORDER — ACETAMINOPHEN 325 MG/1
650 TABLET ORAL EVERY 4 HOURS PRN
Status: DISCONTINUED | OUTPATIENT
Start: 2025-03-12 | End: 2025-03-19

## 2025-03-12 RX ORDER — FUROSEMIDE 20 MG/1
20 TABLET ORAL
Status: DISCONTINUED | OUTPATIENT
Start: 2025-03-13 | End: 2025-03-21

## 2025-03-12 RX ORDER — HYDRALAZINE HYDROCHLORIDE 10 MG/1
10 TABLET, FILM COATED ORAL EVERY 4 HOURS PRN
Status: DISCONTINUED | OUTPATIENT
Start: 2025-03-12 | End: 2025-03-25 | Stop reason: HOSPADM

## 2025-03-12 RX ORDER — CITALOPRAM HYDROBROMIDE 40 MG/1
40 TABLET ORAL
Status: DISCONTINUED | OUTPATIENT
Start: 2025-03-13 | End: 2025-03-25 | Stop reason: HOSPADM

## 2025-03-12 RX ORDER — PANTOPRAZOLE SODIUM 40 MG/1
40 TABLET, DELAYED RELEASE ORAL EVERY MORNING
Status: DISCONTINUED | OUTPATIENT
Start: 2025-03-13 | End: 2025-03-19

## 2025-03-12 RX ORDER — PROCHLORPERAZINE MALEATE 5 MG/1
5 TABLET ORAL EVERY 6 HOURS PRN
Status: DISCONTINUED | OUTPATIENT
Start: 2025-03-12 | End: 2025-03-25 | Stop reason: HOSPADM

## 2025-03-12 RX ORDER — PIPERACILLIN SODIUM, TAZOBACTAM SODIUM 3; .375 G/15ML; G/15ML
3.38 INJECTION, POWDER, LYOPHILIZED, FOR SOLUTION INTRAVENOUS EVERY 8 HOURS
Status: DISCONTINUED | OUTPATIENT
Start: 2025-03-13 | End: 2025-03-17

## 2025-03-12 RX ADMIN — ROPINIROLE HYDROCHLORIDE 1 MG: 1 TABLET, FILM COATED ORAL at 22:59

## 2025-03-12 RX ADMIN — HYDROMORPHONE HYDROCHLORIDE 2 MG: 2 TABLET ORAL at 21:21

## 2025-03-12 RX ADMIN — ACETAMINOPHEN 650 MG: 325 TABLET ORAL at 21:22

## 2025-03-12 RX ADMIN — SIMVASTATIN 40 MG: 40 TABLET, FILM COATED ORAL at 22:59

## 2025-03-12 RX ADMIN — GABAPENTIN 100 MG: 100 CAPSULE ORAL at 23:03

## 2025-03-12 RX ADMIN — PIPERACILLIN AND TAZOBACTAM 3.38 G: 3; .375 INJECTION, POWDER, FOR SOLUTION INTRAVENOUS at 18:18

## 2025-03-12 RX ADMIN — HEPARIN SODIUM 5000 UNITS: 5000 INJECTION, SOLUTION INTRAVENOUS; SUBCUTANEOUS at 21:10

## 2025-03-12 RX ADMIN — SODIUM CHLORIDE 2000 MG: 0.9 INJECTION, SOLUTION INTRAVENOUS at 16:02

## 2025-03-12 ASSESSMENT — ACTIVITIES OF DAILY LIVING (ADL)
ADLS_ACUITY_SCORE: 58
DEPENDENT_IADLS:: CLEANING;COOKING;LAUNDRY;SHOPPING;MEAL PREPARATION;TRANSPORTATION
ADLS_ACUITY_SCORE: 58

## 2025-03-12 NOTE — PHARMACY-VANCOMYCIN DOSING SERVICE
Pharmacy Vancomycin Initial Note  Date of Service 2025  Patient's  1945  79 year old, female    Indication: Skin and Soft Tissue Infection    Current estimated CrCl = Estimated Creatinine Clearance: 62 mL/min (based on SCr of 0.87 mg/dL).    Creatinine for last 3 days  No results found for requested labs within last 3 days.    Recent Vancomycin Level(s) for last 3 days  No results found for requested labs within last 3 days.      Vancomycin IV Administrations (past 72 hours)        No vancomycin orders with administrations in past 72 hours.                    Nephrotoxins and other renal medications (From now, onward)      Start     Dose/Rate Route Frequency Ordered Stop    25 1330  piperacillin-tazobactam (ZOSYN) 3.375 g vial to attach to  mL bag         3.375 g  over 30 Minutes Intravenous ONCE 25 1327              Contrast Orders - past 72 hours (72h ago, onward)      None                Plan:  Start vancomycin 2000 mg IV once  Please re-consult pharmacy if vancomycin to continue upon admission.      Wanda Hicks, [facility]

## 2025-03-12 NOTE — ED PROVIDER NOTES
EMERGENCY DEPARTMENT ENCOUNTER      NAME: Elizabeth Kelley  AGE: 79 year old female  YOB: 1945  MRN: [id number]  EVALUATION DATE & TIME: 3/12/2025 12:58 PM    PCP: System, Provider Not In    ED PROVIDER: Vish Stafford M.D.      Chief Complaint   Patient presents with    Leg Swelling         FINAL IMPRESSION:  1.  Chronic peripheral edema.  2.  Chronic morbid obesity.  3.  Acute left leg cellulitis.      ED COURSE & MEDICAL DECISION MAKIN:28 PM.  I met with the patient to gather history and to perform my initial exam. We discussed plans for the ED course, including diagnostic testing and treatment. PPE worn: cloth mask.  Patient with left leg in the lower transudative rather than exudate.  Patient with chronic right leg problems but this is new on the left.  Ultrasound of the leg, lab work pending.  Antibiotics ordered.  2:32 PM.  Everything still pending.  Tentatively, patient will be signed out to the afternoon ED physician around 3 PM to follow-up on studies and probable admission.    Pertinent Labs & Imaging studies reviewed. (See chart for details)  79 year old female presents to the Emergency Department for evaluation of left leg redness and swelling.    At the conclusion of the encounter I discussed the results of all of the tests and the disposition. The questions were answered. The patient or family acknowledged understanding and was agreeable with the care plan.              Medical Decision Making  Obtained supplemental history:Supplemental history obtained?: No  Reviewed external records: Both inpatient and outpatient computer records were reviewed.   Care impacted by chronic illness: Morbid obesity, lymphedema, venous stasis disease, cellulitis, ulcerations.  Did you consider but not order tests?: Work up considered but not performed and documented in chart, if applicable  Did you interpret images independently?: Independent interpretation of ECG and images noted in documentation,  when applicable.  Consultation discussion with other provider: We will probably discuss this with the admitting service.  Probable admission.    MIPS (CTPE, Dental pain, Amato, Sinusitis, Asthma/COPD, Head Trauma): Not Applicable    SEPSIS: None          MEDICATIONS GIVEN IN THE EMERGENCY:  Medications   piperacillin-tazobactam (ZOSYN) 3.375 g vial to attach to  mL bag (has no administration in time range)       NEW PRESCRIPTIONS STARTED AT TODAY'S ER VISIT  New Prescriptions    No medications on file          =================================================================    HPI    Patient information was obtained from: The patient.    Use of : N/A         Elizabeth Kelley is a 79 year old female with a pertinent history of morbid obesity, leg ulceration and cellulitis who presents to this ED today for evaluation of left leg redness and swelling for the last 3 to 4 days.  Weeping.  Saturating dressings.  She has had problems with the right leg in the past but the left leg is new.    She does not identify any waxing or waning symptoms otherwise, exacerbating or alleviating features, associated symptoms except as mentioned.  She otherwise denies any pain related complaints.    REVIEW OF SYSTEMS   Review of Systems complaining of right leg redness and swelling and weeping.    PAST MEDICAL HISTORY:  Past Medical History:   Diagnosis Date    Ankle contracture, left     Class 3 severe obesity due to excess calories without serious comorbidity with body mass index (BMI) of 45.0 to 49.9 in adult (H)     Combined gastric and duodenal ulcer     Controlled substance agreement broken     Depression     Dyslipidemia     Edema     Edema     Gait abnormality     GERD (gastroesophageal reflux disease)     Gout     Lymphedema of both lower extremities     Melanoma (H)     left upper arm    MS (multiple sclerosis) (H)     RLS (restless legs syndrome)     Skin ulcer of left lower leg (H)     Valgus deformity of both  feet     Vitamin D deficiency        PAST SURGICAL HISTORY:  Past Surgical History:   Procedure Laterality Date    ABLATION SAPHENOUS VEIN W/ RFA Right 04/12/2021    Saphenous vein, anterior accessory saphenous vein, sclerotherapy of multiple veins.    COSMETIC SURGERY  1988    reduction of excess skin from weight loss    ESOPHAGOSCOPY, GASTROSCOPY, DUODENOSCOPY (EGD), COMBINED N/A 03/30/2015    Procedure: UPPER ENDOSCOPY with gastric biopsy;  Surgeon: Checo Fletcher MD;  Location: [facility];  Service:     IRRIGATION AND DEBRIDEMENT LOWER EXTREMITY, COMBINED Right 3/21/2022    Procedure: RIGHT LEG WOUND DEBRIDEMENT, PAULIE DERMATONE, MISONIX, VAC VERA FLOW WITH VASHE;  Surgeon: Gabriele Bullock MD;  Location: [facility] OR    IRRIGATION AND DEBRIDEMENT LOWER EXTREMITY, COMBINED Right 3/28/2022    Procedure: DEBRIDEMENT AND WOUND DRESSING CHANGE AND MYRIAD APPLICATION OF RIGHT LOWER LEG WOUND;  Surgeon: Gabriele Bullock MD;  Location: [facility] OR    MAMMOPLASTY REDUCTION Bilateral     MOHS MICROGRAPHIC PROCEDURE      left upper arm, melanoma    PICC DOUBLE LUMEN PLACEMENT  7/21/2024    PICC SINGLE LUMEN PLACEMENT  8/13/2021         PICC SINGLE LUMEN PLACEMENT  9/2/2021         PICC SINGLE LUMEN PLACEMENT  9/8/2024    PICC SINGLE LUMEN PLACEMENT  1/14/2025    SPINE SURGERY      L5 area surgery, decompression           CURRENT MEDICATIONS:    acetaminophen (TYLENOL) 500 MG tablet  ascorbic acid (VITAMIN C) 250 MG CHEW chewable tablet  aspirin (ASPIRIN 81) 81 MG chewable tablet  buPROPion (WELLBUTRIN SR) 150 MG 12 hr tablet  Carboxymethylcellulose Sodium 0.25 % SOLN  citalopram (CELEXA) 40 MG tablet  ferrous sulfate (FEROSUL) 325 (65 Fe) MG tablet  furosemide (LASIX) 20 MG tablet  gabapentin (NEURONTIN) 100 MG capsule  magnesium oxide (MAG-OX) 400 MG tablet  minocycline (DYNACIN) 50 MG tablet  multivitamin w/minerals (CENTRUM ADULTS) tablet  naproxen (NAPROSYN) 500 MG tablet  nitroGLYcerin (NITROSTAT) 0.4 MG sublingual  tablet  nystatin (MYCOSTATIN) 664986 UNIT/GM external powder  oxyCODONE (ROXICODONE) 5 MG tablet  pantoprazole (PROTONIX) 40 MG EC tablet  potassium chloride ER (K-TAB) 20 MEQ CR tablet  rOPINIRole (REQUIP) 1 MG tablet  rOPINIRole (REQUIP) 1 MG tablet  rOPINIRole (REQUIP) 1 MG tablet  simvastatin (ZOCOR) 40 MG tablet  spironolactone (ALDACTONE) 25 MG tablet  vitamin B-Complex  vitamin D3 (CHOLECALCIFEROL) 250 mcg (06706 units) capsule  Wound Cleansers (VASHE WOUND THERAPY EX)  zinc 50 MG TABS        ALLERGIES:  Allergies   Allergen Reactions    Other Environmental Allergy Anaphylaxis     Bees/Wasps Stings  Bees/Wasps Stings    Adhesive Tape Other (See Comments)     Red,itchy and oozes  Red,itchy and oozes    Adhesive [Cyanoacrylate] Unknown     Other reaction(s): sores    Latex Hives    Ragweeds Itching    Oxycodone-Acetaminophen Rash    Vicodin [Hydrocodone-Acetaminophen] Nausea and Vomiting and Hives       FAMILY HISTORY:  Family History   Problem Relation Age of Onset    Uterine Cancer Mother     Hyperlipidemia Mother     Hypertension Mother     Multiple Sclerosis Mother     Asthma Sister     Obesity Sister     Hyperlipidemia Sister     Hypertension Sister     Multiple Sclerosis Sister     Arthritis Sister     Colon Cancer Paternal Aunt     Breast Cancer Paternal Aunt     Hypertension Paternal Aunt     Hyperlipidemia Paternal Aunt     Brain Cancer Father     Arthritis Maternal Uncle     Arthritis Maternal Grandmother     Early Death Maternal Grandfather     Arthritis Paternal Grandmother     Arthritis Paternal Grandfather        SOCIAL HISTORY:   Social History     Socioeconomic History    Marital status: Single   Tobacco Use    Smoking status: Former     Current packs/day: 0.00     Average packs/day: 0.3 packs/day for 12.0 years (3.0 ttl pk-yrs)     Types: Cigarettes     Start date: 1963     Quit date: 1975     Years since quittin.2    Smokeless tobacco: Never    Tobacco comments:     quit more  than 30 years ago   Substance and Sexual Activity    Alcohol use: Yes     Alcohol/week: 1.0 standard drink of alcohol     Types: 1 Standard drinks or equivalent per week     Comment: glass of wine once a week    Drug use: No    Sexual activity: Yes     Partners: Male     Birth control/protection: Post-menopausal   Social History Narrative    .   Has significant other.  2 grown children.  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       Social Drivers of Health     Financial Resource Strain: Low Risk  (1/12/2025)    Financial Resource Strain     Within the past 12 months, have you or your family members you live with been unable to get utilities (heat, electricity) when it was really needed?: No   Food Insecurity: Low Risk  (1/12/2025)    Food Insecurity     Within the past 12 months, did you worry that your food would run out before you got money to buy more?: No     Within the past 12 months, did the food you bought just not last and you didn t have money to get more?: No   Transportation Needs: Low Risk  (1/12/2025)    Transportation Needs     Within the past 12 months, has lack of transportation kept you from medical appointments, getting your medicines, non-medical meetings or appointments, work, or from getting things that you need?: No   Interpersonal Safety: Low Risk  (1/12/2025)    Interpersonal Safety     Do you feel physically and emotionally safe where you currently live?: Yes     Within the past 12 months, have you been hit, slapped, kicked or otherwise physically hurt by someone?: No     Within the past 12 months, have you been humiliated or emotionally abused in other ways by your partner or ex-partner?: No   Housing Stability: Low Risk  (1/12/2025)    Housing Stability     Do you have housing? : Yes     Are you worried about losing your housing?: No     Former smoker.  No drugs or tobacco.  Occasional alcohol.    VITALS:  BP (!) 144/64   Pulse 78   Temp 97.6  F (36.4  C)   Resp 14   Ht 1.524  m (5')   Wt 112.9 kg (249 lb)   SpO2 99%   BMI 48.63 kg/m      PHYSICAL EXAM    Vital Signs:  BP (!) 144/64   Pulse 78   Temp 97.6  F (36.4  C)   Resp 14   Ht 1.524 m (5')   Wt 112.9 kg (249 lb)   SpO2 99%   BMI 48.63 kg/m    General:  On entering the room she is in no apparent distress.  Extreme morbid obesity is noted.  Neck:  Neck supple with full range of motion and nontender.    Back:  Back and spine are nontender.  No costovertebral angle tenderness.    HEENT:  Oropharynx clear with moist mucous membranes.  HEENT unremarkable.    Pulmonary:  Chest clear to auscultation without rhonchi rales or wheezing.    Cardiovascular:  Cardiac regular rate and rhythm without murmurs rubs or gallops.    Abdomen:  Abdomen soft nontender.  There is no rebound or guarding.    Muskuloskeletal:  She moves all 4 without any difficulty and has normal neurovascular exams.  Extremities without clubbing, cyanosis.  3-4+ lower leg edema and swollen legs.  Redness and warmth to the left lower leg.  Clear weeping transudate.  Legs and calves are nontender.    Neuro:  She is alert and oriented ×3 and moves all extremities symmetrically.    Psych:  Normal affect.    Skin:  Unremarkable and warm and dry.       LAB:  All pertinent labs reviewed and interpreted.  Labs Ordered and Resulted from Time of ED Arrival to Time of ED Departure - No data to display    RADIOLOGY:  Reviewed all pertinent imaging. Please see official radiology report.  US Lower Extremity Venous Duplex Left    (Results Pending)              Vish Stafford M.D.  Emergency Medicine  [facility] EMERGENCY DEPARTMENT  [facility] [facility] [address]  [phone]  Dept: [phone]       Vish Stafford MD  03/12/25 [id number]

## 2025-03-12 NOTE — ED NOTES
Lab and other staff unable to get blood pt needing PICC to come place IV, ok for 1 set blood cultures per MD.

## 2025-03-12 NOTE — ED TRIAGE NOTES
Pt presents to the ED with significant  L leg redness and swelling since Monday. Fevers a few days ago but currently a-febrile in triage.

## 2025-03-12 NOTE — ED PROVIDER NOTES
3:08 PM - Patient signed out to me by Dr. Stafford at routine shift change. 79 year old female with history of obesity presenting with a couple days of LLE redness, swelling, fevers. Plan is follow up US & blood tests, and reassess. Low threshold to admit given prior admissions for same. Please see Dr. Stafford's note for details.    6:49 PM - US with no DVT. Procal 4.7, concern for possible bacteremia. Does not meet sepsis 2 criteria; with HR 80s, no tachypnea; has normal lactic acid as well. Stable for floor. Covered with Zosyn & vancomycin. Consulted hospitalist for admission; they agreed. Patient understood and agreed with the plan; no further questions at the time of admission.    --------------------------------------------------------------------------------   --------------------------------------------------------------------------------     IMPRESSION  1. Cellulitis of left lower extremity    2. Peripheral edema    3. Elevated procalcitonin        PLAN  - admit to hospitalist for further care; med/surg admit        Juan Wiggins MD  03/12/25  Emergency Medicine  [facility] EMERGENCY DEPARTMENT  [address] [place] [id number] [phone]  Dept: [phone]     Juan Wiggins MD  03/12/25 [id number]

## 2025-03-12 NOTE — H&P
[facility]    History and Physical - Hospitalist Service       Date of Admission:  3/12/2025    Assessment & Plan    Elizabeth Kelley is a 79 year old female with PMHx of recurrent right lower extremity cellulitis, chronic lower extremity lymphedema, chronic venous stasis, chronic anemia, RLS, HLD, GERD who was admitted on 3/12/2025. She has a history of recurrent hospitalizations for right lower extremity cellulitis. Patient recently admitted 1/12 - 1/18/2025 for RIGHT lower extremity cellulitis and sepsis.  Had been treated with meropenem and vancomycin, ID was consulted and she was discharged on doxycycline.  She presented to the ER today for evaluation of LEFT lower extremity erythema, edema, pain and admitted for left lower extremity cellulitis.    Left Lower Extremity Cellulitis  ProCal 4.78, .9, WBC 19.3  -- Ultrasound negative for DVT although noted suboptimal exam and limited calf vessel eval  -- Started on Vanco/Zosyn in the ER  -- Continue Vanco/Zosyn for now  -- ID consult placed  -- Trend CBC, CRP  -- Follow-up blood cultures  -- Pain control with Tylenol, Dilaudid as needed    History of Recurrent Right Lower Extremity Cellulitis  Chronic Bilateral Lower Extremity Lymphedema  Chronic Venous Stasis  Patient with history of frequent hospitalizations for sepsis secondary to right lower extremity cellulitis  -- Spironolactone/Lasix on hold for KIKE as below    -- PT for lymphedema wraps   -- WOC consult placed    KIKE  -- Cr 1.62 ; baseline appears around 1  -- Discontinue naproxen and avoid other nephrotoxins  -- Will HOLD Lasix and spironolactone   -- Will hold off on IVF for now given significant edema   -- Trend BMP    Chronic Anemia  -- Hgb 7.9 ; baseline appears around 8  -- No S/S of active bleeding  -- Trend CBC    Restless leg syndrome - continue PTA ropinirole  Anxiety depression - continue PTA Wellbutrin and Celexa  Hyperlipidemia - continue PTA statin   GERD -  "continue PTA PPI        Diet: Combination Diet Regular Diet Adult    DVT Prophylaxis: Heparin SQ  Amato Catheter: Not present  Lines: None     Cardiac Monitoring: None  Code Status: Full Code - discussed and confirmed with patient and  at bedside    Clinically Significant Risk Factors Present on Admission               # Hypoalbuminemia: Lowest albumin = 2.6 g/dL at 3/12/2025  4:00 PM, will monitor as appropriate   # Drug Induced Platelet Defect: home medication list includes an antiplatelet medication    # Acute Kidney Injury, unspecified: based on a >150% or 0.3 mg/dL increase in last creatinine compared to past 90 day average, will monitor renal function  # Hypertension: Noted on problem list      # Anemia: based on hgb <11       # Severe Obesity: Estimated body mass index is 48.63 kg/m  as calculated from the following:    Height as of this encounter: 1.524 m (5').    Weight as of this encounter: 112.9 kg (249 lb).         # Financial/Environmental Concerns: none;other (see comments) (Significant other states, \"without me she would be living on the streets and be unable to afford food, but since I still work full time we do just fine. Her  left her and kicked her out of the house about 15 years ago\".)         Disposition Plan     Medically Ready for Discharge: Anticipated in 2-4 Days        The patient's care was discussed with the Attending Physician, Dr. Kelley Mckeon who independently met with and assessed the patient and is in agreement with the assessment and plan     Edna Stovall PA-C  Hospitalist Service  [facility]  Securely message with [employer] (more info)  Text page via [employer]     ______________________________________________________________________    Chief Complaint   Left lower extremity pain, swelling, redness    History is obtained from the patient    History of Present Illness   Elizabeth Kelley is a 79 year old female with PMHx of " "recurrent right lower extremity cellulitis, chronic lower extremity lymphedema, chronic venous stasis, chronic anemia, RLS, HLD, GERD who presented to the ER on 3/12/2025 for evaluation of left lower extremity redness, pain, swelling.  Patient states she had a temperature at home of 101.4 several days ago, and had over the past several days been feeling overall \"poorly.\"  States yesterday her left lower extremity became swollen, erythematous, painful and itchy.  No known inciting injury to the area, although endorses that she has a \" crack\" in her skin to the lateral side of her left foot from dry skin and states most of the pain and swelling has been surrounding this area.  Patient has home RN for recurrent right lower extremity cellulitis, and patient states RN today instructed patient to present to the ER for changes in her left lower extremity.    Patient states had right lower extremity wounds changed today, she is declining for them to be unwrapped here and right lower extremity examined, however, she states right lower extremity is at her healed baseline.    Past Medical History    Past Medical History:   Diagnosis Date    Ankle contracture, left     Class 3 severe obesity due to excess calories without serious comorbidity with body mass index (BMI) of 45.0 to 49.9 in adult (H)     Combined gastric and duodenal ulcer     Controlled substance agreement broken     Depression     Dyslipidemia     Edema     Edema     Gait abnormality     GERD (gastroesophageal reflux disease)     Gout     Lymphedema of both lower extremities     Melanoma (H)     left upper arm    MS (multiple sclerosis) (H)     RLS (restless legs syndrome)     Skin ulcer of left lower leg (H)     Valgus deformity of both feet     Vitamin D deficiency        Past Surgical History   Past Surgical History:   Procedure Laterality Date    ABLATION SAPHENOUS VEIN W/ RFA Right 04/12/2021    Saphenous vein, anterior accessory saphenous vein, " sclerotherapy of multiple veins.    COSMETIC SURGERY  1988    reduction of excess skin from weight loss    ESOPHAGOSCOPY, GASTROSCOPY, DUODENOSCOPY (EGD), COMBINED N/A 03/30/2015    Procedure: UPPER ENDOSCOPY with gastric biopsy;  Surgeon: Checo Fletcher MD;  Location: [facility];  Service:     IRRIGATION AND DEBRIDEMENT LOWER EXTREMITY, COMBINED Right 3/21/2022    Procedure: RIGHT LEG WOUND DEBRIDEMENT, PAULIE DERMATONE, MISONIX, VAC VERA FLOW WITH VASHE;  Surgeon: Gabriele Bullock MD;  Location:  OR    IRRIGATION AND DEBRIDEMENT LOWER EXTREMITY, COMBINED Right 3/28/2022    Procedure: DEBRIDEMENT AND WOUND DRESSING CHANGE AND MYRIAD APPLICATION OF RIGHT LOWER LEG WOUND;  Surgeon: Gabriele Bullock MD;  Location:  OR    MAMMOPLASTY REDUCTION Bilateral     MOHS MICROGRAPHIC PROCEDURE      left upper arm, melanoma    PICC DOUBLE LUMEN PLACEMENT  7/21/2024    PICC SINGLE LUMEN PLACEMENT  8/13/2021         PICC SINGLE LUMEN PLACEMENT  9/2/2021         PICC SINGLE LUMEN PLACEMENT  9/8/2024    PICC SINGLE LUMEN PLACEMENT  1/14/2025    SPINE SURGERY      L5 area surgery, decompression       Prior to Admission Medications   Prior to Admission Medications   Prescriptions Last Dose Informant Patient Reported? Taking?   Carboxymethylcellulose Sodium 0.25 % SOLN   Yes Yes   Sig: Apply 1 drop to eye daily as needed   Wound Cleansers (VASHE WOUND THERAPY EX)   Yes Yes   Sig: Externally apply topically daily as needed   acetaminophen (TYLENOL) 500 MG tablet   Yes Yes   Sig: Take 1,000 mg by mouth daily as needed for pain.   ascorbic acid (VITAMIN C) 250 MG CHEW chewable tablet 3/12/2025 Morning  Yes Yes   Sig: Take 250 mg by mouth daily.   aspirin (ASPIRIN 81) 81 MG chewable tablet 3/12/2025 Morning  Yes Yes   Sig: Take 1 tablet by mouth every morning   buPROPion (WELLBUTRIN SR) 150 MG 12 hr tablet 3/12/2025 Morning Self Yes Yes   Sig: Take 150 mg by mouth every morning    citalopram (CELEXA) 40 MG tablet 3/12/2025  Self Yes Yes   Sig: Take 40 mg by mouth daily (with lunch)   ferrous sulfate (FEROSUL) 325 (65 Fe) MG tablet 3/12/2025 Morning  Yes Yes   Sig: Take 325 mg by mouth every other day.   furosemide (LASIX) 20 MG tablet 3/12/2025 Morning  Yes Yes   Sig: Take 20 mg by mouth 2 times daily.   gabapentin (NEURONTIN) 100 MG capsule 3/12/2025 Morning  Yes Yes   Sig: Take 100 mg by mouth 3 times daily   magnesium oxide (MAG-OX) 400 MG tablet 3/12/2025  Yes Yes   Sig: Take 1 tablet by mouth every morning   minocycline (DYNACIN) 50 MG tablet 3/12/2025  No Yes   Sig: Take 1 tablet (50 mg) by mouth daily. Start after finishing doxycycline course   multivitamin w/minerals (CENTRUM ADULTS) tablet 3/12/2025 Self Yes Yes   Sig: Take 1 tablet by mouth every morning   naproxen (NAPROSYN) 500 MG tablet   Yes Yes   Sig: Take 500 mg by mouth daily as needed.   nitroGLYcerin (NITROSTAT) 0.4 MG sublingual tablet   Yes Yes   Sig: PLACE 1 TABLET UNDER TONGUE EVERY 5 MINTUES AS NEEDED FOR CHEST PAIN FOR UP TO 30 DAYS   nystatin (MYCOSTATIN) 442904 UNIT/GM external powder   Yes Yes   Sig: Apply topically 2 times daily as needed.   pantoprazole (PROTONIX) 40 MG EC tablet 3/12/2025 Morning  Yes Yes   Sig: Take 40 mg by mouth every morning   potassium chloride ER (K-TAB) 20 MEQ CR tablet 3/12/2025 Morning  Yes Yes   Sig: Take 20 mEq by mouth 2 times daily.   rOPINIRole (REQUIP) 1 MG tablet Bedtime Self Yes No   Sig: Take 1 mg by mouth at bedtime. In addition, take one-half tablet (dose = 0.5 mg) twice daily. Once in the morning and once about dinnertime.   rOPINIRole (REQUIP) 1 MG tablet 3/12/2025 Morning  Yes Yes   Sig: Take 0.5 mg by mouth 2 times daily. Take one-half tablet (dose = 0.5 mg) twice daily. Once in the morning and once about dinnertime.     In addition, take one tablet (1 mg) at bedtime.   simvastatin (ZOCOR) 40 MG tablet 3/11/2025 Evening Self Yes Yes   Sig: [SIMVASTATIN (ZOCOR) 40 MG TABLET] Take 40 mg by mouth bedtime.    spironolactone (ALDACTONE) 25 MG tablet 3/12/2025 Self Yes Yes   Sig: Take 25 mg by mouth every morning   vitamin B-Complex 3/12/2025  Yes Yes   Sig: Take 1 tablet by mouth daily.   vitamin D3 (CHOLECALCIFEROL) 250 mcg (99975 units) capsule 3/12/2025  Yes Yes   Sig: Take 1 capsule by mouth daily.   zinc 50 MG TABS 3/11/2025 Morning  Yes Yes   Sig: Take 1 tablet by mouth three times a week. On Tues, Thur, Sat      Facility-Administered Medications: None           Physical Exam   Vital Signs: Temp: 98.3  F (36.8  C) Temp src: Oral BP: (!) 152/65 Pulse: 95   Resp: 14 SpO2: 99 % O2 Device: None (Room air)    Weight: 249 lbs 0 oz    Constitutional: awake, alert, no apparent distress, and appears stated age  Respiratory: No increased work of breathing, no accessory muscle use, clear to auscultation bilaterally, no crackles or wheezing  Cardiovascular: Regular rate and rhythm, normal S1 and S2  Skin: Left lower extremity erythematous with increased warmth, with significant edema.  Small area of skin breakdown to the left lateral heel, surrounding area significantly tender to palpation.  Gauze is saturated.  Right lower extremity and lymphedema wrap  Neurologic: Awake, alert.   Neuropsychiatric: Appropriate mood, affect and eye contact. Cooperative.              Medical Decision Making             Data     I have personally reviewed the following data over the past 24 hrs:    19.3 (H)  \   7.9 (L)   / 337     138 105 49.7 (H) /  116 (H)   4.1 23 1.62 (H) \     ALT: 27 AST: 15 AP: 300 (H) TBILI: <0.2   ALB: 2.6 (L) TOT PROTEIN: 6.4 LIPASE: N/A     Trop: N/A BNP: 520     Procal: 4.78 (H) CRP: 425.90 (H) Lactic Acid: 1.2       INR:  1.11 PTT:  N/A   D-dimer:  N/A Fibrinogen:  N/A       Imaging results reviewed over the past 24 hrs:   Recent Results (from the past 24 hours)   US Lower Extremity Venous Duplex Bilateral    Narrative    EXAM: US LOWER EXTREMITY VENOUS DUPLEX BILATERAL  LOCATION: [facility]  HOSPITAL  DATE: 3/12/2025    INDICATION: redness, swelling  COMPARISON: None.  TECHNIQUE: Venous Duplex ultrasound of bilateral lower extremities with and without compression, augmentation and duplex. Color flow and spectral Doppler with waveform analysis performed.    FINDINGS: Exam includes the common femoral, femoral, popliteal veins as well as segmentally visualized deep calf veins and greater saphenous vein.     RIGHT: No deep vein thrombosis. No superficial thrombophlebitis. No popliteal cyst.    LEFT: No deep vein thrombosis. No superficial thrombophlebitis. No popliteal cyst.      Impression    IMPRESSION:  1.  No deep venous thrombosis in the bilateral lower extremities.    2.  Suboptimal exam due to patient body habitus and edema. Patient declined unwrapping of the calves due to open wounds. Calf vessels only partially visualized but negative for thrombus were seen.

## 2025-03-13 ENCOUNTER — APPOINTMENT (OUTPATIENT)
Dept: CT IMAGING | Facility: HOSPITAL | Age: 80
End: 2025-03-13
Attending: INTERNAL MEDICINE
Payer: COMMERCIAL

## 2025-03-13 ENCOUNTER — APPOINTMENT (OUTPATIENT)
Dept: PHYSICAL THERAPY | Facility: HOSPITAL | Age: 80
End: 2025-03-13
Payer: COMMERCIAL

## 2025-03-13 ENCOUNTER — APPOINTMENT (OUTPATIENT)
Dept: OCCUPATIONAL THERAPY | Facility: HOSPITAL | Age: 80
End: 2025-03-13
Payer: COMMERCIAL

## 2025-03-13 DIAGNOSIS — Z53.9 DIAGNOSIS NOT YET DEFINED: Primary | ICD-10-CM

## 2025-03-13 PROBLEM — N17.9 AKI (ACUTE KIDNEY INJURY): Status: ACTIVE | Noted: 2025-03-13

## 2025-03-13 PROBLEM — E66.01 MORBID OBESITY (H): Status: ACTIVE | Noted: 2021-07-05

## 2025-03-13 LAB
ALBUMIN SERPL BCG-MCNC: 2.4 G/DL (ref 3.5–5.2)
ALP SERPL-CCNC: 351 U/L (ref 40–150)
ALT SERPL W P-5'-P-CCNC: 26 U/L (ref 0–50)
ANION GAP SERPL CALCULATED.3IONS-SCNC: 10 MMOL/L (ref 7–15)
AST SERPL W P-5'-P-CCNC: 21 U/L (ref 0–45)
BILIRUB SERPL-MCNC: 0.2 MG/DL
BUN SERPL-MCNC: 37.3 MG/DL (ref 8–23)
CALCIUM SERPL-MCNC: 8.6 MG/DL (ref 8.8–10.4)
CHLORIDE SERPL-SCNC: 104 MMOL/L (ref 98–107)
CREAT SERPL-MCNC: 1.29 MG/DL (ref 0.51–0.95)
CRP SERPL-MCNC: 383.1 MG/L
EGFRCR SERPLBLD CKD-EPI 2021: 43 ML/MIN/1.73M2
ERYTHROCYTE [DISTWIDTH] IN BLOOD BY AUTOMATED COUNT: 16.7 % (ref 10–15)
GLUCOSE SERPL-MCNC: 100 MG/DL (ref 70–99)
HCO3 SERPL-SCNC: 22 MMOL/L (ref 22–29)
HCT VFR BLD AUTO: 24.5 % (ref 35–47)
HGB BLD-MCNC: 7.4 G/DL (ref 11.7–15.7)
MCH RBC QN AUTO: 23.7 PG (ref 26.5–33)
MCHC RBC AUTO-ENTMCNC: 30.2 G/DL (ref 31.5–36.5)
MCV RBC AUTO: 79 FL (ref 78–100)
PLATELET # BLD AUTO: 300 10E3/UL (ref 150–450)
POTASSIUM SERPL-SCNC: 4.1 MMOL/L (ref 3.4–5.3)
PROT SERPL-MCNC: 5.7 G/DL (ref 6.4–8.3)
RBC # BLD AUTO: 3.12 10E6/UL (ref 3.8–5.2)
SODIUM SERPL-SCNC: 136 MMOL/L (ref 135–145)
WBC # BLD AUTO: 14.6 10E3/UL (ref 4–11)

## 2025-03-13 PROCEDURE — 99232 SBSQ HOSP IP/OBS MODERATE 35: CPT | Performed by: INTERNAL MEDICINE

## 2025-03-13 PROCEDURE — 85014 HEMATOCRIT: CPT

## 2025-03-13 PROCEDURE — 72193 CT PELVIS W/DYE: CPT

## 2025-03-13 PROCEDURE — 250N000011 HC RX IP 250 OP 636: Performed by: INTERNAL MEDICINE

## 2025-03-13 PROCEDURE — 36415 COLL VENOUS BLD VENIPUNCTURE: CPT

## 2025-03-13 PROCEDURE — 99222 1ST HOSP IP/OBS MODERATE 55: CPT | Performed by: INTERNAL MEDICINE

## 2025-03-13 PROCEDURE — 120N000001 HC R&B MED SURG/OB

## 2025-03-13 PROCEDURE — 97166 OT EVAL MOD COMPLEX 45 MIN: CPT | Mod: GO

## 2025-03-13 PROCEDURE — 250N000011 HC RX IP 250 OP 636

## 2025-03-13 PROCEDURE — 86140 C-REACTIVE PROTEIN: CPT

## 2025-03-13 PROCEDURE — 97530 THERAPEUTIC ACTIVITIES: CPT | Mod: GP

## 2025-03-13 PROCEDURE — 258N000003 HC RX IP 258 OP 636: Performed by: INTERNAL MEDICINE

## 2025-03-13 PROCEDURE — 97535 SELF CARE MNGMENT TRAINING: CPT | Mod: GO

## 2025-03-13 PROCEDURE — 73701 CT LOWER EXTREMITY W/DYE: CPT | Mod: LT

## 2025-03-13 PROCEDURE — 250N000013 HC RX MED GY IP 250 OP 250 PS 637

## 2025-03-13 PROCEDURE — 84155 ASSAY OF PROTEIN SERUM: CPT

## 2025-03-13 PROCEDURE — 97162 PT EVAL MOD COMPLEX 30 MIN: CPT | Mod: GP

## 2025-03-13 PROCEDURE — 82435 ASSAY OF BLOOD CHLORIDE: CPT

## 2025-03-13 PROCEDURE — G0463 HOSPITAL OUTPT CLINIC VISIT: HCPCS

## 2025-03-13 RX ORDER — IOPAMIDOL 755 MG/ML
75 INJECTION, SOLUTION INTRAVASCULAR ONCE
Status: COMPLETED | OUTPATIENT
Start: 2025-03-13 | End: 2025-03-13

## 2025-03-13 RX ADMIN — PIPERACILLIN AND TAZOBACTAM 3.38 G: 3; .375 INJECTION, POWDER, FOR SOLUTION INTRAVENOUS at 00:15

## 2025-03-13 RX ADMIN — CITALOPRAM HYDROBROMIDE 40 MG: 40 TABLET, FILM COATED ORAL at 13:14

## 2025-03-13 RX ADMIN — PIPERACILLIN AND TAZOBACTAM 3.38 G: 3; .375 INJECTION, POWDER, FOR SOLUTION INTRAVENOUS at 07:52

## 2025-03-13 RX ADMIN — GABAPENTIN 100 MG: 100 CAPSULE ORAL at 13:15

## 2025-03-13 RX ADMIN — GABAPENTIN 100 MG: 100 CAPSULE ORAL at 22:04

## 2025-03-13 RX ADMIN — HEPARIN SODIUM 5000 UNITS: 5000 INJECTION, SOLUTION INTRAVENOUS; SUBCUTANEOUS at 22:05

## 2025-03-13 RX ADMIN — PANTOPRAZOLE SODIUM 40 MG: 40 TABLET, DELAYED RELEASE ORAL at 07:52

## 2025-03-13 RX ADMIN — SODIUM CHLORIDE 750 MG: 0.9 INJECTION, SOLUTION INTRAVENOUS at 16:35

## 2025-03-13 RX ADMIN — HYDROMORPHONE HYDROCHLORIDE 2 MG: 2 TABLET ORAL at 22:02

## 2025-03-13 RX ADMIN — ACETAMINOPHEN 650 MG: 325 TABLET ORAL at 13:56

## 2025-03-13 RX ADMIN — HYDROMORPHONE HYDROCHLORIDE 2 MG: 2 TABLET ORAL at 13:57

## 2025-03-13 RX ADMIN — PIPERACILLIN AND TAZOBACTAM 3.38 G: 3; .375 INJECTION, POWDER, FOR SOLUTION INTRAVENOUS at 16:35

## 2025-03-13 RX ADMIN — ACETAMINOPHEN 650 MG: 325 TABLET ORAL at 06:03

## 2025-03-13 RX ADMIN — ROPINIROLE HYDROCHLORIDE 0.5 MG: 0.25 TABLET, FILM COATED ORAL at 16:52

## 2025-03-13 RX ADMIN — HEPARIN SODIUM 5000 UNITS: 5000 INJECTION, SOLUTION INTRAVENOUS; SUBCUTANEOUS at 03:25

## 2025-03-13 RX ADMIN — BUPROPION HYDROCHLORIDE 150 MG: 150 TABLET, FILM COATED, EXTENDED RELEASE ORAL at 07:53

## 2025-03-13 RX ADMIN — ROPINIROLE HYDROCHLORIDE 0.5 MG: 0.25 TABLET, FILM COATED ORAL at 07:54

## 2025-03-13 RX ADMIN — IOPAMIDOL 75 ML: 755 INJECTION, SOLUTION INTRAVENOUS at 20:10

## 2025-03-13 RX ADMIN — SIMVASTATIN 40 MG: 40 TABLET, FILM COATED ORAL at 22:03

## 2025-03-13 RX ADMIN — HYDROMORPHONE HYDROCHLORIDE 2 MG: 2 TABLET ORAL at 06:03

## 2025-03-13 RX ADMIN — HEPARIN SODIUM 5000 UNITS: 5000 INJECTION, SOLUTION INTRAVENOUS; SUBCUTANEOUS at 13:15

## 2025-03-13 RX ADMIN — ASPIRIN 81 MG CHEWABLE TABLET 81 MG: 81 TABLET CHEWABLE at 07:52

## 2025-03-13 RX ADMIN — ACETAMINOPHEN 650 MG: 325 TABLET ORAL at 22:02

## 2025-03-13 RX ADMIN — ROPINIROLE HYDROCHLORIDE 1 MG: 1 TABLET, FILM COATED ORAL at 22:03

## 2025-03-13 RX ADMIN — GABAPENTIN 100 MG: 100 CAPSULE ORAL at 07:52

## 2025-03-13 ASSESSMENT — ACTIVITIES OF DAILY LIVING (ADL)
ADLS_ACUITY_SCORE: 64

## 2025-03-13 NOTE — MEDICATION SCRIBE - ADMISSION MEDICATION HISTORY
Medication Scribe Admission Medication History    Admission medication history is complete. The information provided in this note is only as accurate as the sources available at the time of the update.    Information Source(s): Patient via in-person    Pertinent Information: patient reports self management of medications,     Changes made to PTA medication list:  Added: None  Deleted: oxycodone , Requip PRN (both per patient)  Changed: Lasix to BID, Naproxen to PRN, Requip dosing (all per patient)    Allergies reviewed with patient and updates made in EHR: yes    Medication History Completed By: Ramo Gallego 3/12/2025 7:29 PM    PTA Med List   Medication Sig Last Dose/Taking    acetaminophen (TYLENOL) 500 MG tablet Take 1,000 mg by mouth daily as needed for pain. Taking As Needed    ascorbic acid (VITAMIN C) 250 MG CHEW chewable tablet Take 250 mg by mouth daily. 3/12/2025 Morning    aspirin (ASPIRIN 81) 81 MG chewable tablet Take 1 tablet by mouth every morning 3/12/2025 Morning    buPROPion (WELLBUTRIN SR) 150 MG 12 hr tablet Take 150 mg by mouth every morning  3/12/2025 Morning    Carboxymethylcellulose Sodium 0.25 % SOLN Apply 1 drop to eye daily as needed Taking As Needed    citalopram (CELEXA) 40 MG tablet Take 40 mg by mouth daily (with lunch) 3/12/2025    ferrous sulfate (FEROSUL) 325 (65 Fe) MG tablet Take 325 mg by mouth every other day. 3/12/2025 Morning    furosemide (LASIX) 20 MG tablet Take 20 mg by mouth 2 times daily. 3/12/2025 Morning    gabapentin (NEURONTIN) 100 MG capsule Take 100 mg by mouth 3 times daily 3/12/2025 Morning    magnesium oxide (MAG-OX) 400 MG tablet Take 1 tablet by mouth every morning 3/12/2025    minocycline (DYNACIN) 50 MG tablet Take 1 tablet (50 mg) by mouth daily. Start after finishing doxycycline course 3/12/2025    multivitamin w/minerals (CENTRUM ADULTS) tablet Take 1 tablet by mouth every morning 3/12/2025    naproxen (NAPROSYN) 500 MG tablet Take 500 mg by mouth daily as  needed. Taking As Needed    nitroGLYcerin (NITROSTAT) 0.4 MG sublingual tablet PLACE 1 TABLET UNDER TONGUE EVERY 5 MINTUES AS NEEDED FOR CHEST PAIN FOR UP TO 30 DAYS Taking    nystatin (MYCOSTATIN) 055711 UNIT/GM external powder Apply topically 2 times daily as needed. Taking As Needed    pantoprazole (PROTONIX) 40 MG EC tablet Take 40 mg by mouth every morning 3/12/2025 Morning    potassium chloride ER (K-TAB) 20 MEQ CR tablet Take 20 mEq by mouth 2 times daily. 3/12/2025 Morning    rOPINIRole (REQUIP) 1 MG tablet Take 0.5 mg by mouth 2 times daily. Take one-half tablet (dose = 0.5 mg) twice daily. Once in the morning and once about dinnertime.     In addition, take one tablet (1 mg) at bedtime. 3/12/2025 Morning    simvastatin (ZOCOR) 40 MG tablet [SIMVASTATIN (ZOCOR) 40 MG TABLET] Take 40 mg by mouth bedtime. 3/11/2025 Evening    spironolactone (ALDACTONE) 25 MG tablet Take 25 mg by mouth every morning 3/12/2025    vitamin B-Complex Take 1 tablet by mouth daily. 3/12/2025    vitamin D3 (CHOLECALCIFEROL) 250 mcg (47563 units) capsule Take 1 capsule by mouth daily. 3/12/2025    Wound Cleansers (VASHE WOUND THERAPY EX) Externally apply topically daily as needed Taking As Needed    zinc 50 MG TABS Take 1 tablet by mouth three times a week. On Tues, Thur, Sat 3/11/2025 Morning

## 2025-03-13 NOTE — CONSULTS
"Care Management Initial Consult    General Information  Assessment completed with: Patient, Spouse or significant other, Carolina and significant other Jaleel  Type of CM/SW Visit: Initial Assessment    Primary Care Provider verified and updated as needed: Yes (updated on the facesheet.)   Readmission within the last 30 days: no previous admission in last 30 days      Reason for Consult: discharge planning  Advance Care Planning: Advance Care Planning Reviewed: present on chart          Communication Assessment  Patient's communication style: spoken language (English or Bilingual)          Cognitive  Cognitive/Neuro/Behavioral:                        Living Environment:   People in home: significant other  Carolina and significant other Jaleel  Current living Arrangements: house (\"It is a very small house. There are a few steps to get inside the house. Then inside it is all 1 level.\")      Able to return to prior arrangements: other (see comments) (unknown at this time)       Family/Social Support:  Care provided by: self, spouse/significant other  Provides care for: no one, unable/limited ability to care for self  Marital Status: Lives with Significant Other  Support system: Significant Other, Children (Jaleel states, \"I am really the only one around helping her. [place] Her daughter Carolyn and I don't get along and I try not to speak to her and she is never around to help her mom\".)       Jaleel  Description of Support System: Supportive, Involved    Support Assessment: Adequate family and caregiver support, Adequate social supports, Patient communicates needs well met    Current Resources:   Patient receiving home care services: Yes  Skilled Home Care Services: Skilled Nursing ([facility] RN MWF for wound cares.)     Community Resources: Home Care  Equipment currently used at home: cane, straight, grab bar, toilet, raised toilet seat (\"She sometimes uses the cane. The house is so small " "it is even hard to use a cane inside of it. We have a tub only and she cannot lift her leg into the tub at this time\".)  Supplies currently used at home: Compression Stockings, Wound Care Spplies    Employment/Financial:  Employment Status: retired     Employment/ Comments: \"no  history\"  Financial Concerns: none, other (see comments) (Significant other states, \"without me she would be living on the streets and be unable to afford food, but since I still work full time we do just fine. Her  left her and kicked her out of the house about 15 years ago\".)   Referral to Financial Worker: No       Does the patient's insurance plan have a 3 day qualifying hospital stay waiver?  Yes     Which insurance plan 3 day waiver is available? Alternative insurance waiver    Will the waiver be used for post-acute placement? Undetermined at this time    Lifestyle & Psychosocial Needs:  Social Drivers of Health     Food Insecurity: Low Risk  (1/12/2025)    Food Insecurity     Within the past 12 months, did you worry that your food would run out before you got money to buy more?: No     Within the past 12 months, did the food you bought just not last and you didn t have money to get more?: No   Depression: Not on file   Housing Stability: Low Risk  (1/12/2025)    Housing Stability     Do you have housing? : Yes     Are you worried about losing your housing?: No   Tobacco Use: Medium Risk (10/21/2024)    Patient History     Smoking Tobacco Use: Former     Smokeless Tobacco Use: Never     Passive Exposure: Not on file   Financial Resource Strain: Low Risk  (1/12/2025)    Financial Resource Strain     Within the past 12 months, have you or your family members you live with been unable to get utilities (heat, electricity) when it was really needed?: No   Alcohol Use: Not on file   Transportation Needs: Low Risk  (1/12/2025)    Transportation Needs     Within the past 12 months, has lack of transportation kept you " "from medical appointments, getting your medicines, non-medical meetings or appointments, work, or from getting things that you need?: No   Physical Activity: Not on file   Interpersonal Safety: Low Risk  (1/12/2025)    Interpersonal Safety     Do you feel physically and emotionally safe where you currently live?: Yes     Within the past 12 months, have you been hit, slapped, kicked or otherwise physically hurt by someone?: No     Within the past 12 months, have you been humiliated or emotionally abused in other ways by your partner or ex-partner?: No   Stress: Not on file   Social Connections: Not on file   Health Literacy: Not on file       Functional Status:  Prior to admission patient needed assistance:   Dependent ADLs:: Ambulation-cane, Independent (\"she does sponge baths only because unable to get her leg over into the tub\".)  Dependent IADLs:: Cleaning, Cooking, Laundry, Shopping, Meal Preparation, Transportation (\"She can still drive, but mostly doesn't drive because it takes her 20 minutes to get into the car. Significant other Jaleel does most of the transportation.\")  Assesssment of Functional Status: Not at baseline with ADL Functioning, Not at baseline with mobility, Not at  functional baseline    Mental Health Status:  Mental Health Status: Past Concern  Mental Health Management: Medication    Chemical Dependency Status:  Chemical Dependency Status: No Current Concerns             Values/Beliefs:  Spiritual, Cultural Beliefs, Temple Practices, Values that affect care: no       Cultural/Temple Practices Patient Routinely Participates In: ceremony, prayer  Values/Beliefs Comment: Sabianist    Discussed  Partnership in Safe Discharge Planning  document with patient/family: No    Additional Information:  Carolina lives in a house with her significant other Jaleel. \"It is a very small house. There are a few steps to get inside the house. Then inside it is all 1 level.\"    She also has help from [facility] " "Care RN 3x/week for wound cares and the other days of the week Carolina changes her own dressing. \"It has to be changed daily\".    She is independent with ADLs and gets help with all IADLs. \"She does sponge baths because she is unable to get her leg over into the tub. She can still drive, but mostly doesn't drive because it takes her 20 minutes to get into the car. Significant other Jaleel does most of the transportation.\"    \"She sometimes uses the cane. The house is so small it is even hard to use a cane inside of it\".    Jaleel had many questions for me about additional help in the home. He states, \"I sometimes have to leave for up to 10 days for work. I don't worry about leaving her for short times daily, but I don't like to leave her that long because of her history of falling. When she falls she cannot get up on her own because of her weight so we always have to call 911 to get her up off the floor.\" I answered his questions about private pay home care or respite services. Also talked about Assisted Living vs LTC vs TCU. He states, \"she would not be willing to go to Assisted Living. She has been at a TCU before, but we didn't like that it wasn't in a great area of town. We cannot afford to pay private pay for any services.\"    He also states, \"She doesn't have any income except for social security. She has nothing for group home money. We have tried before to get [facility] services, but they only gave her about $27 for food. I work full time. Without me she would be living on the streets and be unable to afford food, but since I still work full time we do just fine. Her  left her and kicked her out of the house about 15 years ago\". I also gave him information about the Senior Linkage Line and about Elderly Waiver and other possible [facility] resources.    Ride: Medical:  Wheelchair    CM to follow for medical progression of care, discharge recommendations, and final discharge plan. Writer verified patient " demographics and updated any changes needed in the patient chart.    Next Steps:   Medical plan/delay: Wound RN, ID consult, PT/OT recommendations.    Dispo: TCU    CM to do: Awaiting PT/OT recommendations and then patient will need the TCU list if TCU is recommended.    Luh Wells RN

## 2025-03-13 NOTE — CONSULTS
Consultation - Infectious Disease  [facility]  Elizabeth Kelley,  1945, MRN [id number]    Admitting Dx: Cellulitis of left lower extremity [L03.116]  Peripheral edema [R60.0]  Elevated procalcitonin [R79.89]    PCP: Yung Herrmann, [phone]       ASSESSMENT   79-year-old woman with a history of chronic lymphedema, hyperlipidemia, GERD who was admitted for left leg cellulitis.    Left leg cellulitis.  Presenting with fevers then redness and swelling in the entire left leg up into the buttock.  No open lesions or fluctuant areas.  Limited left lower extremity ultrasound without DVT.  Presenting with leukocytosis and elevated inflammatory markers.  Chronic stasis dermatitis, right leg.  History of recurrent infections in the right leg due to open lesions.  Recently placed on minocycline for lifelong suppression.  (50 mg daily)      Active Problems:    Peripheral edema    Cellulitis of left lower extremity    Elevated procalcitonin       PLAN   -Continue vancomycin and Zosyn  -Left hip and thigh CT scan to rule out deep infection  -Follow-up on blood cultures  -trend wbc and CRP     Thank you for this consult. Will follow.    Son Rodriguez MD  [facility]  Direct messaging: [employer] Paging  On-Call ID provider: [phone], option: [phone]      ===========================================      Chief Complaint   <principal problem not specified>       HPI     We have been requested by Vincent Weinberg MD to evaluate Elizabeth Kelley for the above.    History obtained by patient    Elizabeth Kelley is a 79 year old woman with a history of chronic lymphedema, hyperlipidemia, GERD who is admitted with left leg infection.  She has been seen previously multiple times for recurrent right leg infection secondary to stasis dermatitis.  She was recently started on minocycline for chronic prophylaxis.  1 week ago she developed high fevers at home without any other acute symptoms.  Over the  weekend she developed redness and swelling in the left leg.  She presented to the hospital with increasing symptoms with worsening pain.  She does have a crack in her skin on her left foot and wonders if walking barefoot because of this.  She also feels a bump in her left perineal area and wonders if this is significant.  She has tenderness in her left growing.  She has a wound care nurse that comes to her house a few times a week.  No significant changes in her right leg recently.          Review of Systems   Ten systems reviewed and negative except for what is noted in the HPI       Medical History  Past Medical History:   Diagnosis Date    Ankle contracture, left     Class 3 severe obesity due to excess calories without serious comorbidity with body mass index (BMI) of 45.0 to 49.9 in adult (H)     Combined gastric and duodenal ulcer     Controlled substance agreement broken     Depression     Dyslipidemia     Edema     Edema     Gait abnormality     GERD (gastroesophageal reflux disease)     Gout     Lymphedema of both lower extremities     Melanoma (H)     left upper arm    MS (multiple sclerosis) (H)     RLS (restless legs syndrome)     Skin ulcer of left lower leg (H)     Valgus deformity of both feet     Vitamin D deficiency     Surgical History  She  has a past surgical history that includes Mammoplasty reduction (Bilateral); Cosmetic surgery (1988); Esophagoscopy, gastroscopy, duodenoscopy (EGD), combined (N/A, 03/30/2015); Spine surgery; Ablation Saphenous Vein W/ Rfa (Right, 04/12/2021); Mohs micrographic procedure; PICC/Midline Placement (8/13/2021); PICC/Midline Placement (9/2/2021); Irrigation and debridement lower extremity, combined (Right, 3/21/2022); Irrigation and debridement lower extremity, combined (Right, 3/28/2022); PICC/Midline Placement (7/21/2024); PICC/Midline Placement (9/8/2024); and PICC/Midline Placement (1/14/2025).     Social History  Reviewed, and she  reports that she quit  smoking about 49 years ago. Her smoking use included cigarettes. She started smoking about 61 years ago. She has a 3 pack-year smoking history. She has never used smokeless tobacco. She reports current alcohol use of about 1.0 standard drink of alcohol per week. She reports that she does not use drugs.  Social History     Social History Narrative    .   Has significant other.  2 grown children.  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       Family History  family history includes Arthritis in her maternal grandmother, maternal uncle, paternal grandfather, paternal grandmother, and sister; Asthma in her sister; Brain Cancer in her father; Breast Cancer in her paternal aunt; Colon Cancer in her paternal aunt; Early Death in her maternal grandfather; Hyperlipidemia in her mother, paternal aunt, and sister; Hypertension in her mother, paternal aunt, and sister; Multiple Sclerosis in her mother and sister; Obesity in her sister; Uterine Cancer in her mother.  family history reviewed and is not pertinent to the presenting problem.            Allergies     Allergies   Allergen Reactions    Other Environmental Allergy Anaphylaxis     Bees/Wasps Stings  Bees/Wasps Stings    Adhesive Tape Other (See Comments)     Red,itchy and oozes  Red,itchy and oozes    Adhesive [Cyanoacrylate] Unknown     Other reaction(s): sores    Latex Hives    Ragweeds Itching    Oxycodone-Acetaminophen Rash    Vicodin [Hydrocodone-Acetaminophen] Nausea and Vomiting and Hives         Antibiotics   Vancomycin 3/12-  Zosyn 3/12-    Previous:  Minocycline prior to arrival      Physical Exam     Temp:  [98.3  F (36.8  C)-99.2  F (37.3  C)] 98.5  F (36.9  C)  Pulse:  [78-95] 81  Resp:  [18-20] 18  BP: (119-160)/(54-68) 134/60  SpO2:  [87 %-100 %] 97 %    /60   Pulse 81   Temp 98.5  F (36.9  C) (Oral)   Resp 18   Ht 1.524 m (5')   Wt 112.9 kg (249 lb)   SpO2 97%   BMI 48.63 kg/m      GENERAL:  well-developed, well-nourished, sitting in  bed in no acute distress.   HENT:  Head is normocephalic, atraumatic. Oropharynx is moist without exudates or ulcers.  EYES:  Eyes have anicteric sclerae without conjunctival injection or stigmata of endocarditis.   NECK:  Supple.  LUNGS:  Clear to auscultation.  CARDIOVASCULAR:  Regular rate and rhythm with no murmurs, gallops or rubs.  ABDOMEN:  Normal bowel sounds, soft, nontender. No appreciable hepatosplenomegaly  EXT: Left leg erythema, swelling, warmth from the toes up through the thigh and buttocks.  No ulcerations.  Mild calf tenderness without any fluctuant areas or cords.  No perineal abscess noted.  Left inguinal lymphadenopathy.  Right leg is wrapped, see wound care pictures from today.  SKIN:  No acute rashes. no stigmata of endocarditis.  NEUROLOGIC:  Grossly nonfocal.      Cultures   3/12 blood cultures x 2: No growth to date    No results found for the last 90 days.       Laboratory results     Recent Labs   Lab 03/13/25  0814 03/12/25  1600   WBC 14.6* 19.3*   HGB 7.4* 7.9*    337       Recent Labs   Lab 03/13/25  0814 03/12/25  1600    138   CO2 22 23   BUN 37.3* 49.7*   ALBUMIN 2.4* 2.6*   ALKPHOS 351* 300*   ALT 26 27   AST 21 15       Recent Labs   Lab 03/13/25  0814 03/12/25  1600   CRPI 383.10* 425.90*           Imaging   Radiology results reviewed    US Lower Extremity Venous Duplex Bilateral    Result Date: 3/12/2025  EXAM: US LOWER EXTREMITY VENOUS DUPLEX BILATERAL LOCATION: [facility] DATE: 3/12/2025 INDICATION: redness, swelling COMPARISON: None. TECHNIQUE: Venous Duplex ultrasound of bilateral lower extremities with and without compression, augmentation and duplex. Color flow and spectral Doppler with waveform analysis performed. FINDINGS: Exam includes the common femoral, femoral, popliteal veins as well as segmentally visualized deep calf veins and greater saphenous vein. RIGHT: No deep vein thrombosis. No superficial thrombophlebitis. No  popliteal cyst. LEFT: No deep vein thrombosis. No superficial thrombophlebitis. No popliteal cyst.     IMPRESSION: 1.  No deep venous thrombosis in the bilateral lower extremities. 2.  Suboptimal exam due to patient body habitus and edema. Patient declined unwrapping of the calves due to open wounds. Calf vessels only partially visualized but negative for thrombus were seen.      Data reviewed today: I reviewed all medications, new labs and imaging results over the last 24 hours. I personally reviewed the u/s image(s) showing no dvt .  The patient's care was discussed with the Bedside Nurse and Patient.

## 2025-03-13 NOTE — PLAN OF CARE
NURSING PROGRESS NOTE  Shift Summary      Date: March 13, 2025   [id number]  Neuro/Musculoskeletal:  A&Ox4. Calm and pleasant towards writer and other staff. Receptive to all cares.   Cardiac:  Not on TELE monitoring: Apical pulse regular. VSS. Denied and offered no c/o CP.   Respiratory:  LS: CTA, absent of any adventitious sounds. On RA while awake. SpO2 decreases to 87-89% on RA while asleep. Placed on 2ltrs O2 via NC overnight while asleep, SpO2: 94-97%. Absent of SOB while at rest. Absent of cough.   GI/:  BS: normoactive x4Q. Last BM: 3/12 per Pt report. Purewick utilized overnight. Adequate urine output.   Diet/Appetite:  Tolerating a regular diet. Denied nausea.   Activity:  Pt able to make moderate position changes in bed. Pt reports that she ambulates independently inside her home without assistive devices but uses a walker when walking longer distances/ outside.  Pain:  C/o BLE pain 9/10. BLE elevated with use of pillows. PRN Tylenol and Dilaudid PO administered, which was moderately effective. Pain decreased to a tolerable 4/10.   Skin:  RLE has wrap/ bandage present below the knee, unable to fully assess. BLE blanchable erythema/ weeping present.   LDAs + Drips/IVF:  PIV x1 to RUE: SL, dressing CDI. Receiving IV Abx: Zosyn  Protocols/Labs:  Not on any electrolyte replacement protocols.     Pertinent Shift Updates:  Pt slept comfortably overnight. Continued on contact precautions for hx of MRSA in right knee.     Gabriele Taylor RN  ....................................................        Goal Outcome Evaluation:  Problem: Skin or Soft Tissue Infection  Goal: Absence of Infection Signs and Symptoms  3/13/2025 0226 by Gabriele Taylor, RN  Outcome: Progressing  3/13/2025 0226 by Gabriele Taylor, RN  Outcome: Progressing  Intervention: Minimize and Manage Infection Progression  Recent Flowsheet Documentation  Taken 3/13/2025 0000 by Gabriele Taylor, RN  Infection Prevention:   cohorting utilized    environmental surveillance performed   equipment surfaces disinfected   hand hygiene promoted   personal protective equipment utilized   rest/sleep promoted   single patient room provided   visitors restricted/screened     Problem: Pain Acute  Goal: Optimal Pain Control and Function  Outcome: Progressing  Intervention: Develop Pain Management Plan  Recent Flowsheet Documentation  Taken 3/12/2025 2203 by Gabriele Taylor, RN  Pain Management Interventions: declines  Taken 3/12/2025 2121 by Gabriele Taylor, RN  Pain Management Interventions:   medication (see MAR)   pillow support provided   distraction   emotional support  Intervention: Prevent or Manage Pain  Recent Flowsheet Documentation  Taken 3/13/2025 0000 by Gabriele Taylor, RN  Medication Review/Management: medications reviewed

## 2025-03-13 NOTE — PLAN OF CARE
Goal Outcome Evaluation:      Plan of Care Reviewed With: patient, significant other          Outcome Evaluation: Likely needs TCU.

## 2025-03-13 NOTE — PROGRESS NOTES
[facility]    Medicine Progress Note - Hospitalist Service    Date of Admission:  3/12/2025    Assessment & Plan     Elizabeth Kelley is a 79 year old female with history of recurrent right lower extremity cellulitis, chronic lower extremity lymphedema, and chronic venous stasis,  who is admitted for LEFT lower extremity cellulitis.     Left Lower Extremity Cellulitis  Presents with LEFT lower extremity erythema, edema.  Admitted 1/12 - 1/18/2025 for RIGHT lower extremity cellulitis and sepsis.  She was treated with meropenem and vancomycin, then transitioned to doxycycline.  Had elevated procalcitonin, CRP and leukocytosis.   Ultrasound negative for DVT  -- Initiated Vanco/Zosyn in the ER. Continue.   -- ID consulted and input appreciated  -- Follow-up blood cultures  -- Pain control with Tylenol, Dilaudid as needed    KIKE  Cr 1.62, baseline appears around 1  -- HOLD Lasix and spironolactone   -- Monitor creatinine. Improved to 1.29 today.     Chronic Bilateral Lower Extremity Lymphedema, Chronic Venous Stasis  -- Spironolactone/Lasix on hold for KIKE as below    -- PT for lymphedema wraps   -- WOC consulted      Chronic stable conditions:   Chronic Anemia: Hgb 7.9. baseline appears around 8  Restless leg syndrome - continue PTA ropinirole  Anxiety depression - continue PTA Wellbutrin and Celexa  Hyperlipidemia - continue PTA statin   GERD - continue PTA PPI  Obesity: BMI 48.          Diet: Combination Diet Regular Diet Adult    DVT Prophylaxis: Heparin SQ  Amato Catheter: Not present  Lines: None     Cardiac Monitoring: None  Code Status: Full Code      Clinically Significant Risk Factors Present on Admission            # Hypercalcemia: corrected calcium is >10.1, will monitor as appropriate    # Hypoalbuminemia: Lowest albumin = 2.4 g/dL at 3/13/2025  8:14 AM, will monitor as appropriate   # Drug Induced Platelet Defect: home medication list includes an antiplatelet medication   #  "Hypertension: Noted on problem list      # Anemia: based on hgb <11       # Severe Obesity: Estimated body mass index is 48.63 kg/m  as calculated from the following:    Height as of this encounter: 1.524 m (5').    Weight as of this encounter: 112.9 kg (249 lb).       # Financial/Environmental Concerns: none;other (see comments) (Significant other states, \"without me she would be living on the streets and be unable to afford food, but since I still work full time we do just fine. Her  left her and kicked her out of the house about 15 years ago\".)         Social Drivers of Health    Tobacco Use: Medium Risk (10/21/2024)    Patient History     Smoking Tobacco Use: Former     Smokeless Tobacco Use: Never          Disposition Plan     Medically Ready for Discharge: Anticipated in 2-4 Days          Vincent Weinberg MD  Hospitalist Service  [facility]  Securely message with [employer] (more info)  Text page via [employer] Paging/Directory   ______________________________________________________________________    Interval History   Patient reports having a lot of pain from her left leg. She is very weak and needs two assistance to stand up from bed.     Physical Exam   Vital Signs: Temp: 98.5  F (36.9  C) Temp src: Oral BP: 134/60 Pulse: 81   Resp: 18 SpO2: 97 % O2 Device: Nasal cannula Oxygen Delivery: 2 LPM  Weight: 249 lbs 0 oz    General appearance: not in acute distress  HEENT: PERRL, EOMI  Lungs: Clear breath sounds in bilateral lung fields  Cardiovascular: Regular rate and rhythm, normal S1-S2  Abdomen: Soft, non tender, no distension  Musculoskeletal: No joint swelling  Right leg; erythema from ankle to the thigh with tenderness to palpation  Skin: Bilateral lower extremity lymphedema  Neurology: AAO ×3.  Cranial nerves II - XII normal.  Normal muscle strength in all four extremities.     Medical Decision Making       48 MINUTES SPENT BY ME on the date of service doing chart review, history, " exam, documentation & further activities per the note.      Data     I have personally reviewed the following data over the past 24 hrs:    14.6 (H)  \   7.4 (L)   / 300     136 104 37.3 (H) /  100 (H)   4.1 22 1.29 (H) \     ALT: 26 AST: 21 AP: 351 (H) TBILI: 0.2   ALB: 2.4 (L) TOT PROTEIN: 5.7 (L) LIPASE: N/A     Procal: N/A CRP: 383.10 (H) Lactic Acid: N/A         Imaging results reviewed over the past 24 hrs:   No results found for this or any previous visit (from the past 24 hours).

## 2025-03-13 NOTE — PROGRESS NOTES
03/13/25 1320   Appointment Info   Signing Clinician's Name / Credentials (PT) Christina Yi DPT   Living Environment   People in Home significant other   Current Living Arrangements house   Home Accessibility stairs to enter home   Number of Stairs, Main Entrance 4;5   Stair Railings, Main Entrance railings safe and in good condition   Transportation Anticipated health plan transportation   Self-Care   Usual Activity Tolerance moderate   Current Activity Tolerance poor   Equipment Currently Used at Home walker, rolling  (4WW)   General Information   Onset of Illness/Injury or Date of Surgery 03/12/25   Referring Physician Edna Stovall PA-C   Patient/Family Therapy Goals Statement (PT) none   Pertinent History of Current Problem (include personal factors and/or comorbidities that impact the POC) 79 year old female with PMHx of recurrent right lower extremity cellulitis, chronic lower extremity lymphedema, chronic venous stasis, chronic anemia, RLS, HLD, GERD who was admitted on 3/12/2025. She has a history of recurrent hospitalizations for right lower extremity cellulitis. Patient recently admitted 1/12 - 1/18/2025 for RIGHT lower extremity cellulitis and sepsis.  Had been treated with meropenem and vancomycin, ID was consulted and she was discharged on doxycycline.  She presented to the ER today for evaluation of LEFT lower extremity erythema, edema, pain and admitted for left lower extremity cellulitis.   Existing Precautions/Restrictions fall   Strength (Manual Muscle Testing)   Strength (Manual Muscle Testing) Deficits observed during functional mobility   Strength Comments Due to pain.   Bed Mobility   Bed Mobility supine-sit   Supine-Sit [place] (Bed Mobility) moderate assist (50% patient effort);2 person assist   Bed Mobility Limitations decreased ability to use arms for pushing/pulling;decreased ability to use legs for bridging/pushing;impaired ability to control trunk for mobility   Assistive  Device (Bed Mobility) bed rails;draw sheet   Transfers   Transfers sit-stand transfer   Sit-Stand Transfer   Sit-Stand [place] (Transfers) maximum assist (25% patient effort);2 person assist   Assistive Device (Sit-Stand Transfers) walker, front-wheeled   Gait/Stairs (Locomotion)   Comment, (Gait/Stairs) n/a- unable to ambulate functional distance at this time.   Clinical Impression   Criteria for Skilled Therapeutic Intervention Yes, treatment indicated   PT Diagnosis (PT) Impaired functional mobility   Influenced by the following impairments Pain, weakness   Functional limitations due to impairments Impaired transfers, gait, strength   Clinical Presentation (PT Evaluation Complexity) evolving   Clinical Presentation Rationale Presents as diagnosed   Clinical Decision Making (Complexity) moderate complexity   Planned Therapy Interventions (PT) balance training;bed mobility training;gait training;home exercise program;stair training;strengthening;transfer training   Risk & Benefits of therapy have been explained patient   PT Total Evaluation Time   PT Eval, Moderate Complexity Minutes (58314) 10   Physical Therapy Goals   PT Frequency 5x/week   PT Predicted Duration/Target Date for Goal Attainment 03/20/25   PT Goals Bed Mobility;Transfers;Gait   PT: Bed Mobility Minimal assist;Supine to/from sit   PT: Transfers Minimal assist;Sit to/from stand;Bed to/from chair;Assistive device   PT: Gait Moderate assist;Rolling walker;50 feet   PT Discharge Planning   PT Plan progress transfers, amb with chair follow, LE ex   PT Discharge Recommendation (DC Rec) Transitional Care Facility   PT Rationale for DC Rec Heavy Ax2 for mobility. Recommend TCU to improve overall mobility.   PT Brief overview of current status Sit <> stand, max Ax2. Able to perform side steps along bedside with min A and FWW.   PT Total Distance Amb During Session (feet) 5   PT Equipment Needed at Discharge walker, rolling;wheelchair   Physical Therapy  Time and Intention   Total Session Time (sum of timed and untimed services) 10       Christina Yi, PT, DPT  3/13/2025

## 2025-03-13 NOTE — PHARMACY-VANCOMYCIN DOSING SERVICE
"Pharmacy Vancomycin Initial Note  Date of Service 2025  Patient's  1945  79 year old, female    Indication: Skin and Soft Tissue Infection    Current estimated CrCl = Estimated Creatinine Clearance: 33.3 mL/min (A) (based on SCr of 1.62 mg/dL (H)).    Creatinine for last 3 days  3/12/2025:  4:00 PM Creatinine 1.62 mg/dL    Recent Vancomycin Level(s) for last 3 days  No results found for requested labs within last 3 days.      Vancomycin IV Administrations (past 72 hours)                     vancomycin (VANCOCIN) 2,000 mg in 0.9% NaCl 520 mL intermittent infusion (mg) 2,000 mg New Bag 25 1602                    Nephrotoxins and other renal medications (From now, onward)      Start     Dose/Rate Route Frequency Ordered Stop    25 0000  piperacillin-tazobactam (ZOSYN) 3.375 g vial to attach to  mL bag        Note to Pharmacy: For [facility], [facility] and [facility]: For Zosyn-naive patients, use the \"Zosyn initial dose + extended infusion\" order panel.    3.375 g  over 240 Minutes Intravenous EVERY 8 HOURS 25              Contrast Orders - past 72 hours (72h ago, onward)      None            [employer] Prediction of Planned Initial Vancomycin Regimen  Loading dose: N/A  Regimen: 750 mg IV every 24 hours.  Start time: 16:02 on 2025  Exposure target: AUC24 (range)400-600 mg/L.hr   AUC24,ss: 559 mg/L.hr  Probability of AUC24 > 400: 77 %  Ctrough,ss: 18.1 mg/L  Probability of Ctrough,ss > 20: 44 %  Probability of nephrotoxicity (Lodise LOUIS ): 14 %        Plan:  Start vancomycin  750 mg IV q24h.   Vancomycin monitoring method: AUC  Vancomycin therapeutic monitoring goal: 400-600 mg*h/L  Pharmacy will check vancomycin levels as appropriate in 1-3 Days.    Serum creatinine levels will be ordered daily for the first week of therapy and at least twice weekly for subsequent weeks.      Carolyn Garcia, PharmD, BCPS 25 8:59 PM         "

## 2025-03-13 NOTE — CONSULTS
[facility]  WO Nurse Inpatient Assessment     Consulted for: right and lower extremities    Summary: 3/13 Patient had left leg wrapped by Lymphedema team prior to [facility] arrival.  Patient known to WO team, notes from previous admissions available in Epic.  Sees home care x3 a week and does wound care independently on other days.  Right leg had signs of pseudomonas including green drainage and sweet, yeast like odor.  Vashe wound cleanser should get rid of pseudomonas.    WO nurse follow-up plan: weekly    Patient History (according to provider note(s):       Elizabeth Kelley is a 79 year old female with PMHx of recurrent right lower extremity cellulitis, chronic lower extremity lymphedema, chronic venous stasis, chronic anemia, RLS, HLD, GERD who was admitted on 3/12/2025. She has a history of recurrent hospitalizations for right lower extremity cellulitis. Patient recently admitted 1/12 - 1/18/2025 for RIGHT lower extremity cellulitis and sepsis.  Had been treated with meropenem and vancomycin, ID was consulted and she was discharged on doxycycline.  She presented to the ER today for evaluation of LEFT lower extremity erythema, edema, pain and admitted for left lower extremity cellulitis.     Assessment:      Areas visualized during today's visit: Lower extremities     Skin Injury Location: right leg            Green drainage                                               front                                                                 lateral                                                               medial  Last photo: 3/13/2025  Skin injury due to: Unclear etiology and cellulitis  Skin history and plan of care:   see chart  Affected area:      Skin assessment: Denudement, Edema, and Maceration     Measurements (length x width x depth, in cm) circumference of leg from knee to ankle     Color: normal and consistent with surrounding tissue     Temperature  normal      Drainage:  "copious .      Color: creamy      Odor: strong and sweet  Pain: moderate, sharp  Pain interventions prior to dressing change: slow and gentle cares   Treatment goal: Drainage control, Decrease moisture, and Protection  STATUS: initial assessment  Supplies ordered: supplies stored on unit       Treatment Plan:     Right leg wound(s): Daily  and PRN can do wound cares on Lymphedema schedule if wrapping leg  Moisten 4\" roll of Kerlix with Vashe, wring out excess.  Wrap leg from knee to ankle and let soak for 5 minutes  Cover wound with Xeroform, 5-6 pieces  Cover with Mextra 2 large under knee with medium size to fill in gap and two medium around ankle.  Secure with 2 rolls of dry 4\" Kerlix      Call stores for Mextra if out Large PS# [id number], medium PS# [id number]      Orders: Written    RECOMMEND PRIMARY TEAM ORDER: None, at this time  Education provided: plan of care  Discussed plan of care with: Patient and Nurse  Notify [facility] if wound(s) deteriorate.  Nursing to notify the Provider(s) and re-consult the [facility] Nurse if new skin concern.    DATA:     Current support surface: Standard  Standard gel mattress (Isoflex)  Containment of urine/stool: Continent of bladder and Continent of bowel  BMI: Body mass index is 48.63 kg/m .   Active diet order: Orders Placed This Encounter      Combination Diet Regular Diet Adult     Output: I/O last 3 completed shifts:  In: 240 [P.O.:240]  Out: 350 [Urine:350]     Labs:   Recent Labs   Lab 03/13/25  0814 03/12/25  1600   ALBUMIN 2.4* 2.6*   HGB 7.4* 7.9*   INR  --  1.11   WBC 14.6* 19.3*     Pressure injury risk assessment:   Sensory Perception: 4-->no impairment  Moisture: 3-->occasionally moist  Activity: 3-->walks occasionally  Mobility: 3-->slightly limited  Nutrition: 3-->adequate  Friction and Shear: 2-->potential problem  Ajith Score: 18    ALBARO Da SilvaN RN CWOCN  Pager no longer in use, please contact through [employer] group: [facility]      "

## 2025-03-13 NOTE — DISCHARGE INSTRUCTIONS
"[facility] DISCHARGE INSTRUCTIONS:  Right leg wound(s): Every other day can do wound cares on Lymphedema schedule if wrapping leg  Moisten 4\" roll of Kerlix with Vashe, wring out excess.  Wrap leg from knee to ankle and let soak for 15-20 minutes then remove  Cover wound with Polymem roll (#[id number]) - WRITING AWAY FROM WOUND, secure with kerlix roll  Cover Calf with 2 large Mextra under knee with medium size to fill in gap and two medium around ankle.  Secure with 2 rolls of dry 4\" Kerlix from toes to knees, place pillow under calf and protect pillow with Chux     Call stores for Mextra if out Large PS# [id number], medium PS# [id number]    "

## 2025-03-13 NOTE — PROGRESS NOTES
Occupational Therapy      03/13/25 0730   Appointment Info   Signing Clinician's Name / Credentials (OT) Jamilah Brower, OTR/L, CLT   Quick Adds   Quick Adds Edema   Living Environment   People in Home significant other  (SO travels for work)   Current Living Arrangements house   Self-Care   Equipment Currently Used at Home cane, straight;grab bar, toilet;raised toilet seat   Fall history within last six months no   Activity/Exercise/Self-Care Comment Patient reports she is independent with ADLs at baseline   General Information   Onset of Illness/Injury or Date of Surgery 03/12/25   Referring Physician Vincent Weinberg MD   Patient/Family Therapy Goal Statement (OT) none stated   Additional Occupational Profile Info/Pertinent History of Current Problem Elizabeth Kelley is a 79 year old female with PMHx of recurrent right lower extremity cellulitis, chronic lower extremity lymphedema, chronic venous stasis, chronic anemia, RLS, HLD, GERD who was admitted on 3/12/2025. She has a history of recurrent hospitalizations for right lower extremity cellulitis. Patient recently admitted 1/12 - 1/18/2025 for RIGHT lower extremity cellulitis and sepsis.  Had been treated with meropenem and vancomycin, ID was consulted and she was discharged on doxycycline.  She presented to the ER today for evaluation of LEFT lower extremity erythema, edema, pain and admitted for left lower extremity cellulitis.   Existing Precautions/Restrictions fall   Cognitive Status Examination   Orientation Status orientation to person, place and time   Edema General Information   Onset of Edema   (long-term, patient has been receiving home care services for lymphedema/wound management)   Affected Body Part(s) Left LE;Right LE   Edema Etiology   (R leg celluitis, wound on L)   Etiology Comments cellulitis, recurrent   Edema Precautions Acute infection   Edema Examination/Assessment   Skin Condition Pitting;Temperature;Dryness;Hemosiderin deposits   Stemmer  Sign Positive   Skin Integrity no open areas on LLE, minor weeping   Pitting Assessment 3+   Edema Assessment Comments Did not visualize RLE, wound consult placed   Range of Motion Comprehensive   General Range of Motion no range of motion deficits identified   Strength Comprehensive (MMT)   General Manual Muscle Testing (MMT) Assessment no strength deficits identified   Bed Mobility   Bed Mobility supine-sit;sit-supine   Supine-Sit [place] (Bed Mobility) moderate assist (50% patient effort)   Sit-Supine [place] (Bed Mobility) moderate assist (50% patient effort)   Transfers   Transfers sit-stand transfer   Sit-Stand Transfer   Sit/Stand Transfer Comments Unable to stand due to pain   Activities of Daily Living   BADL Assessment/Intervention lower body dressing   Lower Body Dressing Assessment/Training   Phillips Level (Lower Body Dressing) maximum assist (25% patient effort);socks   Clinical Impression   Criteria for Skilled Therapeutic Interventions Met (OT) Yes, treatment indicated   OT Diagnosis Decreased ADL independence   Edema: Patient Presentation Edema   OT Problem List-Impairments impacting ADL problems related to;activity tolerance impaired;balance;pain   Assessment of Occupational Performance 3-5 Performance Deficits   Identified Performance Deficits Dressing, trsfs, bed mobility   Planned Therapy Interventions (OT) ADL retraining;balance training;bed mobility training;transfer training   Edema: Planned Interventions Gradient compression bandaging;Fit for compression garment;Education   Clinical Decision Making Complexity (OT) detailed assessment/moderate complexity   Risk & Benefits of therapy have been explained evaluation/treatment results reviewed;care plan/treatment goals reviewed   OT Total Evaluation Time   OT Chelsey Moderate Complexity Minutes (37333) 20   OT Goals   Therapy Frequency (OT) Daily   OT Predicted Duration/Target Date for Goal Attainment 03/20/25   OT Goals Edema;Lower  Body Dressing;Transfers;Toilet Transfer/Toileting   OT: Lower Body Dressing Modified independent;including set-up/clothing retrieval;within precautions;using adaptive equipment   OT: Transfer Minimal assist   OT: Toilet Transfer/Toileting Minimal assist   OT: Edema education to increase ability to manage edema after discharge from the hospital Patient;Verbalize;Skin care routine;signs/symptoms of intolerance;wear schedule   OT: Management of edema bandages Patient;Verbalize;garment(s)   Self-Care/Home Management   Self-Care/Home Mgmt/ADL, Compensatory, Meal Prep Minutes (91649) 23   Treatment Detail/Skilled Intervention Edema: washed/dried/applied lotion to LLE, applied SSB, 50% overlap, medium compression, herringbone technique. Patient RLE wrap intact, will wait for wound care to assess and then rewrap as needed   OT Discharge Planning   OT Plan OT: trsfs/bed mob/dressing Lymph: re-wrap assess RLE   OT Discharge Recommendation (DC Rec) Transitional Care Facility   OT Rationale for DC Rec Patient currently requiring assistance with ADLs and is alone @ times at home. However, pending progress, patient may be able to discharge home with home care services.   OT Brief overview of current status Limited ability to perform ADLs due to pain   Total Session Time   Timed Code Treatment Minutes 23   Total Session Time (sum of timed and untimed services) 43

## 2025-03-14 ENCOUNTER — APPOINTMENT (OUTPATIENT)
Dept: OCCUPATIONAL THERAPY | Facility: HOSPITAL | Age: 80
End: 2025-03-14
Payer: COMMERCIAL

## 2025-03-14 LAB
BASOPHILS # BLD AUTO: 0.1 10E3/UL (ref 0–0.2)
BASOPHILS NFR BLD AUTO: 0 %
CREAT SERPL-MCNC: 1.26 MG/DL (ref 0.51–0.95)
CRP SERPL-MCNC: 376.3 MG/L
EGFRCR SERPLBLD CKD-EPI 2021: 44 ML/MIN/1.73M2
EOSINOPHIL # BLD AUTO: 0.3 10E3/UL (ref 0–0.7)
EOSINOPHIL NFR BLD AUTO: 3 %
ERYTHROCYTE [DISTWIDTH] IN BLOOD BY AUTOMATED COUNT: 16.9 % (ref 10–15)
HCT VFR BLD AUTO: 23 % (ref 35–47)
HGB BLD-MCNC: 7.1 G/DL (ref 11.7–15.7)
IMM GRANULOCYTES # BLD: 0.4 10E3/UL
IMM GRANULOCYTES NFR BLD: 3 %
LYMPHOCYTES # BLD AUTO: 1.5 10E3/UL (ref 0.8–5.3)
LYMPHOCYTES NFR BLD AUTO: 13 %
MCH RBC QN AUTO: 24.1 PG (ref 26.5–33)
MCHC RBC AUTO-ENTMCNC: 30.9 G/DL (ref 31.5–36.5)
MCV RBC AUTO: 78 FL (ref 78–100)
MONOCYTES # BLD AUTO: 1.6 10E3/UL (ref 0–1.3)
MONOCYTES NFR BLD AUTO: 13 %
NEUTROPHILS # BLD AUTO: 8 10E3/UL (ref 1.6–8.3)
NEUTROPHILS NFR BLD AUTO: 68 %
NRBC # BLD AUTO: 0 10E3/UL
NRBC BLD AUTO-RTO: 0 /100
PLAT MORPH BLD: ABNORMAL
PLATELET # BLD AUTO: 305 10E3/UL (ref 150–450)
RBC # BLD AUTO: 2.94 10E6/UL (ref 3.8–5.2)
RBC MORPH BLD: ABNORMAL
TARGETS BLD QL SMEAR: SLIGHT
WBC # BLD AUTO: 11.8 10E3/UL (ref 4–11)

## 2025-03-14 PROCEDURE — 99232 SBSQ HOSP IP/OBS MODERATE 35: CPT | Performed by: INTERNAL MEDICINE

## 2025-03-14 PROCEDURE — 258N000003 HC RX IP 258 OP 636: Performed by: INTERNAL MEDICINE

## 2025-03-14 PROCEDURE — 85018 HEMOGLOBIN: CPT | Performed by: INTERNAL MEDICINE

## 2025-03-14 PROCEDURE — 86140 C-REACTIVE PROTEIN: CPT | Performed by: INTERNAL MEDICINE

## 2025-03-14 PROCEDURE — 250N000013 HC RX MED GY IP 250 OP 250 PS 637: Performed by: INTERNAL MEDICINE

## 2025-03-14 PROCEDURE — 250N000011 HC RX IP 250 OP 636

## 2025-03-14 PROCEDURE — 250N000011 HC RX IP 250 OP 636: Performed by: INTERNAL MEDICINE

## 2025-03-14 PROCEDURE — 85004 AUTOMATED DIFF WBC COUNT: CPT | Performed by: INTERNAL MEDICINE

## 2025-03-14 PROCEDURE — 82565 ASSAY OF CREATININE: CPT | Performed by: INTERNAL MEDICINE

## 2025-03-14 PROCEDURE — 999N000248 HC STATISTIC IV INSERT WITH US BY RN

## 2025-03-14 PROCEDURE — 120N000001 HC R&B MED SURG/OB

## 2025-03-14 PROCEDURE — 97535 SELF CARE MNGMENT TRAINING: CPT | Mod: GO

## 2025-03-14 PROCEDURE — 36415 COLL VENOUS BLD VENIPUNCTURE: CPT | Performed by: INTERNAL MEDICINE

## 2025-03-14 PROCEDURE — 250N000013 HC RX MED GY IP 250 OP 250 PS 637

## 2025-03-14 RX ORDER — BENZONATATE 100 MG/1
100 CAPSULE ORAL 3 TIMES DAILY PRN
Status: DISCONTINUED | OUTPATIENT
Start: 2025-03-14 | End: 2025-03-25 | Stop reason: HOSPADM

## 2025-03-14 RX ADMIN — HYDROMORPHONE HYDROCHLORIDE 2 MG: 2 TABLET ORAL at 06:52

## 2025-03-14 RX ADMIN — GABAPENTIN 100 MG: 100 CAPSULE ORAL at 14:05

## 2025-03-14 RX ADMIN — PIPERACILLIN AND TAZOBACTAM 3.38 G: 3; .375 INJECTION, POWDER, FOR SOLUTION INTRAVENOUS at 09:10

## 2025-03-14 RX ADMIN — SIMVASTATIN 40 MG: 40 TABLET, FILM COATED ORAL at 21:00

## 2025-03-14 RX ADMIN — HYDROMORPHONE HYDROCHLORIDE 1 MG: 2 TABLET ORAL at 20:58

## 2025-03-14 RX ADMIN — HEPARIN SODIUM 5000 UNITS: 5000 INJECTION, SOLUTION INTRAVENOUS; SUBCUTANEOUS at 06:52

## 2025-03-14 RX ADMIN — BUPROPION HYDROCHLORIDE 150 MG: 150 TABLET, FILM COATED, EXTENDED RELEASE ORAL at 08:15

## 2025-03-14 RX ADMIN — ROPINIROLE HYDROCHLORIDE 1 MG: 1 TABLET, FILM COATED ORAL at 21:00

## 2025-03-14 RX ADMIN — GABAPENTIN 100 MG: 100 CAPSULE ORAL at 20:57

## 2025-03-14 RX ADMIN — PANTOPRAZOLE SODIUM 40 MG: 40 TABLET, DELAYED RELEASE ORAL at 08:16

## 2025-03-14 RX ADMIN — SODIUM CHLORIDE 750 MG: 0.9 INJECTION, SOLUTION INTRAVENOUS at 23:04

## 2025-03-14 RX ADMIN — HYDROMORPHONE HYDROCHLORIDE 2 MG: 2 TABLET ORAL at 09:09

## 2025-03-14 RX ADMIN — ASPIRIN 81 MG CHEWABLE TABLET 81 MG: 81 TABLET CHEWABLE at 08:15

## 2025-03-14 RX ADMIN — PIPERACILLIN AND TAZOBACTAM 3.38 G: 3; .375 INJECTION, POWDER, FOR SOLUTION INTRAVENOUS at 00:43

## 2025-03-14 RX ADMIN — BENZONATATE 100 MG: 100 CAPSULE ORAL at 04:08

## 2025-03-14 RX ADMIN — GABAPENTIN 100 MG: 100 CAPSULE ORAL at 08:16

## 2025-03-14 RX ADMIN — ACETAMINOPHEN 650 MG: 325 TABLET ORAL at 21:05

## 2025-03-14 RX ADMIN — ROPINIROLE HYDROCHLORIDE 0.5 MG: 0.25 TABLET, FILM COATED ORAL at 16:35

## 2025-03-14 RX ADMIN — ROPINIROLE HYDROCHLORIDE 0.5 MG: 0.25 TABLET, FILM COATED ORAL at 08:15

## 2025-03-14 RX ADMIN — HEPARIN SODIUM 5000 UNITS: 5000 INJECTION, SOLUTION INTRAVENOUS; SUBCUTANEOUS at 14:05

## 2025-03-14 RX ADMIN — CITALOPRAM HYDROBROMIDE 40 MG: 40 TABLET, FILM COATED ORAL at 14:05

## 2025-03-14 RX ADMIN — HEPARIN SODIUM 5000 UNITS: 5000 INJECTION, SOLUTION INTRAVENOUS; SUBCUTANEOUS at 21:00

## 2025-03-14 RX ADMIN — ACETAMINOPHEN 650 MG: 325 TABLET ORAL at 08:16

## 2025-03-14 RX ADMIN — ACETAMINOPHEN 650 MG: 325 TABLET ORAL at 16:50

## 2025-03-14 RX ADMIN — HYDROMORPHONE HYDROCHLORIDE 1 MG: 2 TABLET ORAL at 14:05

## 2025-03-14 ASSESSMENT — ACTIVITIES OF DAILY LIVING (ADL)
ADLS_ACUITY_SCORE: 49
ADLS_ACUITY_SCORE: 64
ADLS_ACUITY_SCORE: 49
ADLS_ACUITY_SCORE: 65
ADLS_ACUITY_SCORE: 65
ADLS_ACUITY_SCORE: 49
ADLS_ACUITY_SCORE: 64
ADLS_ACUITY_SCORE: 65
ADLS_ACUITY_SCORE: 64
ADLS_ACUITY_SCORE: 65
ADLS_ACUITY_SCORE: 64
ADLS_ACUITY_SCORE: 65
ADLS_ACUITY_SCORE: 64
ADLS_ACUITY_SCORE: 64

## 2025-03-14 NOTE — PROGRESS NOTES
Infectious Diseases Progress Note  [facility]    Date of visit: 03/14/2025     ASSESSMENT   79-year-old woman with a history of chronic lymphedema, hyperlipidemia, GERD who was admitted for left leg cellulitis.    Left leg cellulitis.  Presenting with fevers then redness and swelling in the entire left leg up into the buttock.  No open lesions or fluctuant areas.  Limited left lower extremity ultrasound without DVT.  Presenting with leukocytosis and elevated inflammatory markers. Wbc and CRP better today. CT left thigh/pelvis showing possible myositis/fasciitis, no abscess. Improving with antibiotics   Chronic stasis dermatitis, right leg.  History of recurrent infections in the right leg due to open lesions.  Recently placed on minocycline for lifelong suppression.  (50 mg daily)      Principal Problem:    Cellulitis of left lower extremity  Active Problems:    Acquired lymphedema of leg    Morbid obesity (H)    Peripheral edema    KIKE (acute kidney injury)       PLAN   -Continue vancomycin and Zosyn  -trend wbc and CRP   -seems to be improving; consider surgery consult if signs of worsening      Son Rodriguez MD  [facility]  Direct messaging: [employer] Paging  On-Call ID provider: [phone], option: 9      ===========================================      SUBJECTIVE / INTERVAL HISTORY:     No acute events. Feels about the same today.      Antibiotics   Vancomycin 3/12-  Zosyn 3/12-    Previous:  Minocycline prior to arrival      Physical Exam     Temp:  [97.9  F (36.6  C)-99.4  F (37.4  C)] 97.9  F (36.6  C)  Pulse:  [78-85] 85  Resp:  [18-20] 20  BP: (117-134)/(53-62) 130/58  SpO2:  [94 %-98 %] 96 %    /58 (BP Location: Left arm)   Pulse 85   Temp 97.9  F (36.6  C) (Oral)   Resp 20   Ht 1.524 m (5')   Wt 116 kg (255 lb 11.7 oz)   SpO2 96%   BMI 49.94 kg/m      GENERAL:  well-developed, well-nourished, lying in bed in no acute distress.   HENT:  Head is  "normocephalic, atraumatic.   EYES:  Eyes have anicteric sclerae without conjunctival injection   LUNGS:  normal respiratory pattern   Lymph: no left inguinal tenderness today  EXT: Left leg erythema. Redness in thigh and buttock is much improved; residual \"impetigo\" like lesions. Lower leg wrapped.  SKIN:  No acute rashes.   NEUROLOGIC:  Grossly nonfocal.      Cultures   3/12 blood cultures x 2: No growth to date    No results found for the last 90 days.       Laboratory results     Recent Labs   Lab 03/14/25  0611 03/13/25  0814 03/12/25  1600   WBC 11.8* 14.6* 19.3*   HGB 7.1* 7.4* 7.9*    300 337       Recent Labs   Lab 03/13/25  0814 03/12/25  1600    138   CO2 22 23   BUN 37.3* 49.7*   ALBUMIN 2.4* 2.6*   ALKPHOS 351* 300*   ALT 26 27   AST 21 15       Recent Labs   Lab 03/14/25  0611 03/13/25  0814 03/12/25  1600   CRPI 376.30* 383.10* 425.90*           Imaging   Radiology results reviewed    US Lower Extremity Venous Duplex Bilateral    Result Date: 3/12/2025  EXAM: US LOWER EXTREMITY VENOUS DUPLEX BILATERAL LOCATION: [facility] DATE: 3/12/2025 INDICATION: redness, swelling COMPARISON: None. TECHNIQUE: Venous Duplex ultrasound of bilateral lower extremities with and without compression, augmentation and duplex. Color flow and spectral Doppler with waveform analysis performed. FINDINGS: Exam includes the common femoral, femoral, popliteal veins as well as segmentally visualized deep calf veins and greater saphenous vein. RIGHT: No deep vein thrombosis. No superficial thrombophlebitis. No popliteal cyst. LEFT: No deep vein thrombosis. No superficial thrombophlebitis. No popliteal cyst.     IMPRESSION: 1.  No deep venous thrombosis in the bilateral lower extremities. 2.  Suboptimal exam due to patient body habitus and edema. Patient declined unwrapping of the calves due to open wounds. Calf vessels only partially visualized but negative for thrombus were seen.      Data " reviewed today: I reviewed all medications, new labs and imaging results over the last 24 hours. I personally reviewed the CT leg image(s) showing no abscess .  The patient's care was discussed with the Patient.

## 2025-03-14 NOTE — PLAN OF CARE
Goal Outcome Evaluation:      Plan of Care Reviewed With: patient    Overall Patient Progress: improvingOverall Patient Progress: improving         Problem: Adult Inpatient Plan of Care  Goal: Plan of Care Review  Description: The Plan of Care Review/Shift note should be completed every shift.  The Outcome Evaluation is a brief statement about your assessment that the patient is improving, declining, or no change.  This information will be displayed automatically on your shift  note.  Outcome: Progressing  Flowsheets (Taken 3/14/2025 2275)  Plan of Care Reviewed With: patient  Overall Patient Progress: improving     Pt arrived from ED at 1450. Transferred to bed via hover mat. Lymphedema wrap on LLE and dressing on RLE. LLE-thigh to knee is visible. Red and warm to the touch. Pressure ulcer noted in gluteal crease. Mepilex applied and note left for MD. VSS. Report given to oncoming RN.

## 2025-03-14 NOTE — PLAN OF CARE
Problem: Adult Inpatient Plan of Care  Goal: Plan of Care Review  Description: The Plan of Care Review/Shift note should be completed every shift.  The Outcome Evaluation is a brief statement about your assessment that the patient is improving, declining, or no change.  This information will be displayed automatically on your shift  note.  Outcome: Progressing   Goal Outcome Evaluation:    Pt alert and oriented x4, complains of bilateral leg pain, pain managed with PRN dilaudid, purewick in place, call light within reach, makes needs known, slept between cares

## 2025-03-14 NOTE — PROGRESS NOTES
Assumed care of pt @1130. AO, VSS on RA. Endorses 6/10 LLE pain, controlled with PRN dilaudid @0924,1405. Lymph wraps removed per pt pain tolerance- OT aware. Wound cares completed this AM by previous RN. Primafit in place, up with assist 2 per PT. Good intake. No acute events reported at this time.    Fernando Scanlon RN

## 2025-03-14 NOTE — PLAN OF CARE
"Goal Outcome Evaluation:    Problem: Adult Inpatient Plan of Care  Goal: Patient-Specific Goal (Individualized)  Description: You can add care plan individualizations to a care plan. Examples of Individualization might be:  \"Parent requests to be called daily at 9am for status\", \"I have a hard time hearing out of my right ear\", or \"Do not touch me to wake me up as it startles  me\".  Outcome: Progressing     Problem: Adult Inpatient Plan of Care  Goal: Optimal Comfort and Wellbeing  Outcome: Progressing     Problem: Delirium  Goal: Optimal Coping  Outcome: Progressing     Problem: Delirium  Goal: Improved Behavioral Control  Outcome: Progressing     Problem: Delirium  Goal: Improved Attention and Thought Clarity  Outcome: Progressing   A/O x4. VSS. PRN Dilaudid and Tylenol were given for left leg pain which were effective per pt. Wound care to left lower leg done.                             "

## 2025-03-14 NOTE — PROGRESS NOTES
[facility]    Medicine Progress Note - Hospitalist Service    Date of Admission:  3/12/2025    Assessment & Plan     Elizabeth Kelley is a 79 year old female with history of recurrent right lower extremity cellulitis, chronic lower extremity lymphedema, and chronic venous stasis,  who is admitted for LEFT lower extremity cellulitis.     Left Lower Extremity Cellulitis  Presents with LEFT lower extremity erythema, edema.  Admitted 1/12 - 1/18/2025 for RIGHT lower extremity cellulitis and sepsis.  She was treated with meropenem and vancomycin, then transitioned to doxycycline.  Had elevated procalcitonin, CRP and leukocytosis.   Ultrasound negative for DVT.  CT scan of the left thigh shows no abscess and no osteomyelitis.   -- Initiated Vanco/Zosyn in the ER. Continue.   -- ID consulted and input appreciated  -- Follow-up blood cultures  -- Pain control with Tylenol, Dilaudid as needed    KIKE  Cr 1.62, baseline appears around 1  -- HOLD Lasix and spironolactone   -- Monitor creatinine. Improved to 1.26 today.     Chronic Bilateral Lower Extremity Lymphedema, Chronic Venous Stasis  -- Spironolactone/Lasix on hold for KIKE as below    -- PT for lymphedema wraps   -- WOC consulted      Chronic stable conditions:   Chronic Anemia: Hgb 7.9. baseline appears around 8  Restless leg syndrome - continue PTA ropinirole  Anxiety depression - continue PTA Wellbutrin and Celexa  Hyperlipidemia - continue PTA statin   GERD - continue PTA PPI  Obesity: BMI 48.          Diet: Combination Diet Regular Diet Adult    DVT Prophylaxis: Heparin SQ  Amato Catheter: Not present  Lines: None     Cardiac Monitoring: None  Code Status: Full Code      Clinically Significant Risk Factors               # Hypoalbuminemia: Lowest albumin = 2.4 g/dL at 3/13/2025  8:14 AM, will monitor as appropriate     # Hypertension: Noted on problem list              # Severe Obesity: Estimated body mass index is 49.94 kg/m  as calculated  "from the following:    Height as of this encounter: 1.524 m (5').    Weight as of this encounter: 116 kg (255 lb 11.7 oz)., PRESENT ON ADMISSION       # Financial/Environmental Concerns: none;other (see comments) (Significant other states, \"without me she would be living on the streets and be unable to afford food, but since I still work full time we do just fine. Her  left her and kicked her out of the house about 15 years ago\".)         Social Drivers of Health    Tobacco Use: Medium Risk (10/21/2024)    Patient History     Smoking Tobacco Use: Former     Smokeless Tobacco Use: Never          Disposition Plan     Medically Ready for Discharge: Anticipated in 2-4 Days          Vincent Weinberg MD  Hospitalist Service  [facility]  Securely message with [employer] (more info)  Text page via [employer] Paging/Directory   ______________________________________________________________________    Interval History   Patient reports she continues to have a lot of pain from her left leg. Her left leg remains red and swollen.     Physical Exam   Vital Signs: Temp: 98.1  F (36.7  C) Temp src: Oral BP: 123/60 Pulse: 78   Resp: 20 SpO2: 96 % O2 Device: None (Room air)    Weight: 255 lbs 11.74 oz    General appearance: not in acute distress  HEENT: PERRL, EOMI  Lungs: Clear breath sounds in bilateral lung fields  Cardiovascular: Regular rate and rhythm, normal S1-S2  Abdomen: Soft, non tender, no distension  Musculoskeletal: No joint swelling  Left leg: erythema and swelling from ankle to the thigh with tenderness to palpation  Right leg: superficial wound in lower leg. No erythema and no swelling.  Skin: Bilateral lower extremity lymphedema  Neurology: AAO ×3.  Cranial nerves II - XII normal.  Normal muscle strength in all four extremities.     Medical Decision Making       48 MINUTES SPENT BY ME on the date of service doing chart review, history, exam, documentation & further activities per the note.  "     Data     I have personally reviewed the following data over the past 24 hrs:    11.8 (H)  \   7.1 (L)   / 305     N/A N/A N/A /  N/A   N/A N/A 1.26 (H) \     Procal: N/A CRP: 376.30 (H) Lactic Acid: N/A         Imaging results reviewed over the past 24 hrs:   Recent Results (from the past 24 hours)   CT Femur Thigh Left with Contrast    Narrative    EXAM: CT FEMUR THIGH LEFT WITH CONTRAST, CT PELVIS SOFT TISSUE W CONTRAST  LOCATION: [facility]  DATE: 3/13/2025    INDICATION: left leg cellulitis  COMPARISON: None  TECHNIQUE: IV contrast. Axial, sagittal and coronal thin-section reconstruction. Dose reduction techniques were used.   CONTRAST: isovue 370 75ml    FINDINGS:     BONES AND JOINTS:  -No acute fracture or malalignment. No evidence of erosions or periostitis to suggest acute osteomyelitis. Degenerative changes throughout the pelvis and lower lumbar spine. Osteopenia. No significant joint effusion.    SOFT TISSUES:  -Moderate soft tissue edema and skin thickening throughout the left thigh. No discrete fluid collection. Mild to moderate subcutaneous soft tissue edema at the pelvis laterally. Mildly enlarged inguinal lymph nodes bilaterally. Prominent left iliac   nodes. Focal fat stranding anterior to the left adductor longus muscle. Gluteal muscle atrophy bilaterally. Multiple gas locules in the soft tissue of the anterior left abdominal wall, favored to be injection related. No acute intrapelvic abnormality.   Colonic diverticulosis. Calcified uterine fibroid. Small right renal calculi.      Impression    IMPRESSION:  1.  Moderate soft tissue edema involving the pelvis and left thigh, compatible with cellulitis in the appropriate clinical setting. No abscess.  2.  Focal fat stranding anterior to the left adductor longus muscle, cannot exclude a component of fasciitis or myositis.   3.  Mildly enlarged inguinal and left iliac lymph nodes are nonspecific and may be reactive.  4.  No  evidence of acute osteomyelitis.  5.  Other ancillary findings as described above.     CT Pelvis Soft Tissue w Contrast    Narrative    EXAM: CT FEMUR THIGH LEFT WITH CONTRAST, CT PELVIS SOFT TISSUE W CONTRAST  LOCATION: [facility]  DATE: 3/13/2025    INDICATION: left leg cellulitis  COMPARISON: None  TECHNIQUE: IV contrast. Axial, sagittal and coronal thin-section reconstruction. Dose reduction techniques were used.   CONTRAST: isovue 370 75ml    FINDINGS:     BONES AND JOINTS:  -No acute fracture or malalignment. No evidence of erosions or periostitis to suggest acute osteomyelitis. Degenerative changes throughout the pelvis and lower lumbar spine. Osteopenia. No significant joint effusion.    SOFT TISSUES:  -Moderate soft tissue edema and skin thickening throughout the left thigh. No discrete fluid collection. Mild to moderate subcutaneous soft tissue edema at the pelvis laterally. Mildly enlarged inguinal lymph nodes bilaterally. Prominent left iliac   nodes. Focal fat stranding anterior to the left adductor longus muscle. Gluteal muscle atrophy bilaterally. Multiple gas locules in the soft tissue of the anterior left abdominal wall, favored to be injection related. No acute intrapelvic abnormality.   Colonic diverticulosis. Calcified uterine fibroid. Small right renal calculi.      Impression    IMPRESSION:  1.  Moderate soft tissue edema involving the pelvis and left thigh, compatible with cellulitis in the appropriate clinical setting. No abscess.  2.  Focal fat stranding anterior to the left adductor longus muscle, cannot exclude a component of fasciitis or myositis.   3.  Mildly enlarged inguinal and left iliac lymph nodes are nonspecific and may be reactive.  4.  No evidence of acute osteomyelitis.  5.  Other ancillary findings as described above.

## 2025-03-15 ENCOUNTER — APPOINTMENT (OUTPATIENT)
Dept: OCCUPATIONAL THERAPY | Facility: HOSPITAL | Age: 80
End: 2025-03-15
Payer: COMMERCIAL

## 2025-03-15 LAB
BASOPHILS # BLD AUTO: 0.1 10E3/UL (ref 0–0.2)
BASOPHILS NFR BLD AUTO: 0 %
CREAT SERPL-MCNC: 1.04 MG/DL (ref 0.51–0.95)
CRP SERPL-MCNC: 299.4 MG/L
EGFRCR SERPLBLD CKD-EPI 2021: 55 ML/MIN/1.73M2
EOSINOPHIL # BLD AUTO: 0.3 10E3/UL (ref 0–0.7)
EOSINOPHIL NFR BLD AUTO: 3 %
ERYTHROCYTE [DISTWIDTH] IN BLOOD BY AUTOMATED COUNT: 16.8 % (ref 10–15)
HCT VFR BLD AUTO: 23.1 % (ref 35–47)
HGB BLD-MCNC: 7 G/DL (ref 11.7–15.7)
HOLD SPECIMEN: NORMAL
IMM GRANULOCYTES # BLD: 0.6 10E3/UL
IMM GRANULOCYTES NFR BLD: 5 %
LYMPHOCYTES # BLD AUTO: 1.6 10E3/UL (ref 0.8–5.3)
LYMPHOCYTES NFR BLD AUTO: 13 %
MCH RBC QN AUTO: 23.8 PG (ref 26.5–33)
MCHC RBC AUTO-ENTMCNC: 30.3 G/DL (ref 31.5–36.5)
MCV RBC AUTO: 79 FL (ref 78–100)
MONOCYTES # BLD AUTO: 1.4 10E3/UL (ref 0–1.3)
MONOCYTES NFR BLD AUTO: 12 %
NEUTROPHILS # BLD AUTO: 7.9 10E3/UL (ref 1.6–8.3)
NEUTROPHILS NFR BLD AUTO: 67 %
NRBC # BLD AUTO: 0 10E3/UL
NRBC BLD AUTO-RTO: 0 /100
PLATELET # BLD AUTO: 345 10E3/UL (ref 150–450)
RBC # BLD AUTO: 2.94 10E6/UL (ref 3.8–5.2)
VANCOMYCIN SERPL-MCNC: 11.1 UG/ML
WBC # BLD AUTO: 11.8 10E3/UL (ref 4–11)

## 2025-03-15 PROCEDURE — 80202 ASSAY OF VANCOMYCIN: CPT | Performed by: INTERNAL MEDICINE

## 2025-03-15 PROCEDURE — 86140 C-REACTIVE PROTEIN: CPT | Performed by: INTERNAL MEDICINE

## 2025-03-15 PROCEDURE — 85004 AUTOMATED DIFF WBC COUNT: CPT | Performed by: INTERNAL MEDICINE

## 2025-03-15 PROCEDURE — 250N000011 HC RX IP 250 OP 636

## 2025-03-15 PROCEDURE — 97535 SELF CARE MNGMENT TRAINING: CPT | Mod: GO

## 2025-03-15 PROCEDURE — 85014 HEMATOCRIT: CPT | Performed by: INTERNAL MEDICINE

## 2025-03-15 PROCEDURE — 82565 ASSAY OF CREATININE: CPT | Performed by: INTERNAL MEDICINE

## 2025-03-15 PROCEDURE — 36415 COLL VENOUS BLD VENIPUNCTURE: CPT | Performed by: INTERNAL MEDICINE

## 2025-03-15 PROCEDURE — 250N000011 HC RX IP 250 OP 636: Performed by: INTERNAL MEDICINE

## 2025-03-15 PROCEDURE — 250N000013 HC RX MED GY IP 250 OP 250 PS 637

## 2025-03-15 PROCEDURE — 999N000127 HC STATISTIC PERIPHERAL IV START W US GUIDANCE

## 2025-03-15 PROCEDURE — 120N000001 HC R&B MED SURG/OB

## 2025-03-15 PROCEDURE — 250N000013 HC RX MED GY IP 250 OP 250 PS 637: Performed by: INTERNAL MEDICINE

## 2025-03-15 PROCEDURE — 99232 SBSQ HOSP IP/OBS MODERATE 35: CPT | Performed by: INTERNAL MEDICINE

## 2025-03-15 PROCEDURE — 258N000003 HC RX IP 258 OP 636: Performed by: INTERNAL MEDICINE

## 2025-03-15 RX ADMIN — PANTOPRAZOLE SODIUM 40 MG: 40 TABLET, DELAYED RELEASE ORAL at 09:45

## 2025-03-15 RX ADMIN — HYDROMORPHONE HYDROCHLORIDE 2 MG: 2 TABLET ORAL at 09:44

## 2025-03-15 RX ADMIN — HYDROMORPHONE HYDROCHLORIDE 2 MG: 2 TABLET ORAL at 00:00

## 2025-03-15 RX ADMIN — BENZONATATE 100 MG: 100 CAPSULE ORAL at 22:12

## 2025-03-15 RX ADMIN — GABAPENTIN 100 MG: 100 CAPSULE ORAL at 09:46

## 2025-03-15 RX ADMIN — ROPINIROLE HYDROCHLORIDE 0.5 MG: 0.25 TABLET, FILM COATED ORAL at 09:47

## 2025-03-15 RX ADMIN — HYDROMORPHONE HYDROCHLORIDE 2 MG: 2 TABLET ORAL at 03:03

## 2025-03-15 RX ADMIN — ROPINIROLE HYDROCHLORIDE 0.5 MG: 0.25 TABLET, FILM COATED ORAL at 17:48

## 2025-03-15 RX ADMIN — SODIUM CHLORIDE 750 MG: 0.9 INJECTION, SOLUTION INTRAVENOUS at 09:46

## 2025-03-15 RX ADMIN — ONDANSETRON 4 MG: 2 INJECTION, SOLUTION INTRAMUSCULAR; INTRAVENOUS at 22:04

## 2025-03-15 RX ADMIN — GABAPENTIN 100 MG: 100 CAPSULE ORAL at 14:33

## 2025-03-15 RX ADMIN — HEPARIN SODIUM 5000 UNITS: 5000 INJECTION, SOLUTION INTRAVENOUS; SUBCUTANEOUS at 20:42

## 2025-03-15 RX ADMIN — FUROSEMIDE 20 MG: 20 TABLET ORAL at 17:38

## 2025-03-15 RX ADMIN — ACETAMINOPHEN 650 MG: 325 TABLET ORAL at 03:03

## 2025-03-15 RX ADMIN — HEPARIN SODIUM 5000 UNITS: 5000 INJECTION, SOLUTION INTRAVENOUS; SUBCUTANEOUS at 05:24

## 2025-03-15 RX ADMIN — CITALOPRAM HYDROBROMIDE 40 MG: 40 TABLET, FILM COATED ORAL at 11:46

## 2025-03-15 RX ADMIN — SIMVASTATIN 40 MG: 40 TABLET, FILM COATED ORAL at 20:41

## 2025-03-15 RX ADMIN — HEPARIN SODIUM 5000 UNITS: 5000 INJECTION, SOLUTION INTRAVENOUS; SUBCUTANEOUS at 14:34

## 2025-03-15 RX ADMIN — PIPERACILLIN AND TAZOBACTAM 3.38 G: 3; .375 INJECTION, POWDER, FOR SOLUTION INTRAVENOUS at 17:37

## 2025-03-15 RX ADMIN — SODIUM CHLORIDE 750 MG: 0.9 INJECTION, SOLUTION INTRAVENOUS at 20:42

## 2025-03-15 RX ADMIN — BUPROPION HYDROCHLORIDE 150 MG: 150 TABLET, FILM COATED, EXTENDED RELEASE ORAL at 09:48

## 2025-03-15 RX ADMIN — ACETAMINOPHEN 650 MG: 325 TABLET ORAL at 17:54

## 2025-03-15 RX ADMIN — ACETAMINOPHEN 650 MG: 325 TABLET ORAL at 09:59

## 2025-03-15 RX ADMIN — PIPERACILLIN AND TAZOBACTAM 3.38 G: 3; .375 INJECTION, POWDER, FOR SOLUTION INTRAVENOUS at 09:46

## 2025-03-15 RX ADMIN — ASPIRIN 81 MG CHEWABLE TABLET 81 MG: 81 TABLET CHEWABLE at 09:48

## 2025-03-15 RX ADMIN — HYDROMORPHONE HYDROCHLORIDE 2 MG: 2 TABLET ORAL at 14:33

## 2025-03-15 RX ADMIN — HYDROMORPHONE HYDROCHLORIDE 2 MG: 2 TABLET ORAL at 17:49

## 2025-03-15 RX ADMIN — PIPERACILLIN AND TAZOBACTAM 3.38 G: 3; .375 INJECTION, POWDER, FOR SOLUTION INTRAVENOUS at 00:00

## 2025-03-15 RX ADMIN — ROPINIROLE HYDROCHLORIDE 1 MG: 1 TABLET, FILM COATED ORAL at 20:41

## 2025-03-15 RX ADMIN — GABAPENTIN 100 MG: 100 CAPSULE ORAL at 20:41

## 2025-03-15 ASSESSMENT — ACTIVITIES OF DAILY LIVING (ADL)
ADLS_ACUITY_SCORE: 50
ADLS_ACUITY_SCORE: 49
ADLS_ACUITY_SCORE: 49
ADLS_ACUITY_SCORE: 50
ADLS_ACUITY_SCORE: 49
ADLS_ACUITY_SCORE: 50
ADLS_ACUITY_SCORE: 49
ADLS_ACUITY_SCORE: 50
ADLS_ACUITY_SCORE: 49
ADLS_ACUITY_SCORE: 50
ADLS_ACUITY_SCORE: 50

## 2025-03-15 NOTE — PLAN OF CARE
Goal Outcome Evaluation:    Problem: Skin or Soft Tissue Infection  Goal: Absence of Infection Signs and Symptoms  Outcome: Progressing  Intervention: Minimize and Manage Infection Progression  Recent Flowsheet Documentation  Taken 3/15/2025 1540 by Rikki Pink RN  Infection Prevention: hand hygiene promoted  Isolation Precautions: contact precautions maintained  Taken 3/15/2025 1000 by Rikki Pink RN  Infection Prevention: hand hygiene promoted  Isolation Precautions: contact precautions maintained     Problem: Pain Acute  Goal: Optimal Pain Control and Function  Outcome: Progressing  Intervention: Optimize Psychosocial Wellbeing  Recent Flowsheet Documentation  Taken 3/15/2025 1540 by Rikki Pink RN  Diversional Activities: puzzles  Taken 3/15/2025 1000 by Rikki Pink RN  Diversional Activities: puzzles  Intervention: Prevent or Manage Pain  Recent Flowsheet Documentation  Taken 3/15/2025 1540 by Rikki Pink RN  Bowel Elimination Promotion: privacy promoted  Medication Review/Management: medications reviewed  Taken 3/15/2025 1000 by Rikki Pink RN  Bowel Elimination Promotion: privacy promoted  Medication Review/Management: medications reviewed      Pt is alert and oriented x 4. She is on RA.   Wound cares done per pt's specification. Pt stated severe pain RLE, with cares, Did not want the xeroform on the wound, stated it was burning and very painful. Said she  preferred the non adhesive strips which was use in place of the xeroform.  Prn dilaudid given x3 with relief.

## 2025-03-15 NOTE — PLAN OF CARE
Problem: Pain Acute  Goal: Optimal Pain Control and Function  Outcome: Not Progressing  Intervention: Develop Pain Management Plan  Recent Flowsheet Documentation  Taken 3/15/2025 0000 by Jem Andrew RN  Pain Management Interventions: medication (see MAR)  Intervention: Prevent or Manage Pain  Recent Flowsheet Documentation  Taken 3/15/2025 0000 by Jem Andrew RN  Medication Review/Management: medications reviewed     Problem: Adult Inpatient Plan of Care  Goal: Absence of Hospital-Acquired Illness or Injury  Outcome: Progressing  Intervention: Identify and Manage Fall Risk  Recent Flowsheet Documentation  Taken 3/15/2025 0000 by Jem Andrew RN  Safety Promotion/Fall Prevention:   activity supervised   clutter free environment maintained   lighting adjusted   mobility aid in reach   nonskid shoes/slippers when out of bed   patient and family education  Intervention: Prevent Skin Injury  Recent Flowsheet Documentation  Taken 3/15/2025 0000 by Jem Andrew RN  Body Position: log-rolled  Intervention: Prevent and Manage VTE (Venous Thromboembolism) Risk  Recent Flowsheet Documentation  Taken 3/15/2025 0000 by Jem Andrew RN  VTE Prevention/Management: (lymph wrap LLE, RLE dressed) other (see comments)  Intervention: Prevent Infection  Recent Flowsheet Documentation  Taken 3/15/2025 0000 by Jem Andrew RN  Infection Prevention: hand hygiene promoted   Goal Outcome Evaluation:  /53 (BP Location: Left arm)   Pulse 85   Temp 99.5  F (37.5  C) (Oral)   Resp 20   Ht 1.524 m (5')   Wt 116 kg (255 lb 11.7 oz)   SpO2 92%   BMI 49.94 kg/m    A&Ox4, pain as high as 9/10 given PO dilaudid and tylenol with relief. Lymph wrap to LLE, RLE dressed (CDI). Mepilex dressing CDI. PICC start RPIV. Reg diet. Not oob this shift. Will continue with plan of care.

## 2025-03-15 NOTE — PROGRESS NOTES
Care Management Follow Up    Length of Stay (days): 3    Expected Discharge Date: 03/15/2025     Concerns to be Addressed: discharge planning     Patient plan of care discussed at interdisciplinary rounds: Yes    Anticipated Discharge Disposition: recTransitional Care - patient declines  Anticipated Discharge Services: resume home health nursing for wound care  Anticipated Discharge DME: None    Patient/family educated on Medicare website which has current facility and service quality ratings: yes  Education Provided on the Discharge Plan: Yes  Patient/Family in Agreement with the Plan:      Referrals Placed by CM/SW: patient declines TCU referrals and desires to resume home RN services only  Private pay costs discussed: Not applicable    Discussed  Partnership in Safe Discharge Planning  document with patient/family: No     Handoff Completed: No, handoff not indicated or clinically appropriate    Additional Information:  Patient was seen by therapy and TCU was recommended. This writer met with patient to discuss. She did accept list of TCUs but politely and adamantly declines referrals being made. Patient states she has underlying MS and prefers to be in her home where things are set up the way she needs. She states she has good support through home nursing and her significant other (though he travels for work). She also notes that her children are unaware that she is in the hospital as they are on a trip to [facility] and she does not want to interrupt their travels. Patient is open to continuing to meet with CM for discharge planning.    Next Steps: CM to follow for medical progression and to continue to work with patient on discharge plans and needs.    Ute Martinez, Northern Light Acadia HospitalSW

## 2025-03-15 NOTE — PROGRESS NOTES
Infectious Diseases Progress Note  [facility]    Date of visit: 03/15/2025     ASSESSMENT   79-year-old woman with a history of chronic lymphedema, hyperlipidemia, GERD who was admitted for left leg cellulitis.    Left leg cellulitis.  Presenting with fevers then redness and swelling in the entire left leg up into the buttock.  No open lesions or fluctuant areas.  Limited left lower extremity ultrasound without DVT.  Presenting with leukocytosis and elevated inflammatory markers. Wbc and CRP slow improvement. CT left thigh/pelvis showing possible myositis/fasciitis, no abscess. Improving with antibiotics.    Chronic stasis dermatitis, right leg.  History of recurrent infections in the right leg due to open lesions.  Recently placed on minocycline for lifelong suppression.  (50 mg daily)      Principal Problem:    Cellulitis of left lower extremity  Active Problems:    Acquired lymphedema of leg    Morbid obesity (H)    Peripheral edema    KIKE (acute kidney injury)       PLAN   -continue vancomycin and Zosyn  -slow improvement clinically, CRP, WBC  -continue IV abx through weekend, reassess on Monday   -seems to be improving; consider surgery consult if signs of worsening  -ID will follow up on Monday, please call with questions in change in clinical status      Kelley Snyder MD  [facility]  Direct messaging: [email or web address] Paging  On-Call ID provider: [phone], option: [phone]      ===========================================      SUBJECTIVE / INTERVAL HISTORY:     First visit by me. Tolerating antibiotics. Discussed micro, antibiotic plan.       Antibiotics   Vancomycin 3/12-  Zosyn 3/12-    Previous:  Minocycline prior to arrival      Physical Exam     Temp:  [97.9  F (36.6  C)-99.5  F (37.5  C)] 98.5  F (36.9  C)  Pulse:  [78-85] 82  Resp:  [18-20] 18  BP: (115-130)/(53-60) 124/57  SpO2:  [92 %-96 %] 92 %    /57 (BP Location: Left arm)   Pulse 82   Temp 98.5  F (36.9  C) (Oral)    Resp 18   Ht 1.524 m (5')   Wt 116 kg (255 lb 11.7 oz)   SpO2 92%   BMI 49.94 kg/m      GENERAL:  well-developed, well-nourished, lying in bed in no acute distress.   HENT:  Head is normocephalic, atraumatic.   EYES:  Eyes have anicteric sclerae without conjunctival injection   LUNGS:  normal respiratory pattern   EXT: Lower leg wrapped. Some swelling, erythema mostly dependent areas  SKIN:  No acute rashes.   NEUROLOGIC:  Grossly nonfocal.      Cultures   3/12 blood cultures x 2: No growth to date    No results found for the last 90 days.       Laboratory results     Recent Labs   Lab 03/15/25  0633 03/14/25  0611 03/13/25  0814   WBC 11.8* 11.8* 14.6*   HGB 7.0* 7.1* 7.4*    305 300       Recent Labs   Lab 03/13/25  0814 03/12/25  1600    138   CO2 22 23   BUN 37.3* 49.7*   ALBUMIN 2.4* 2.6*   ALKPHOS 351* 300*   ALT 26 27   AST 21 15       Recent Labs   Lab 03/15/25  0633 03/14/25  0611 03/13/25  0814   CRPI 299.40* 376.30* 383.10*           Imaging   Radiology results reviewed    US Lower Extremity Venous Duplex Bilateral    Result Date: 3/12/2025  EXAM: US LOWER EXTREMITY VENOUS DUPLEX BILATERAL LOCATION: [facility] DATE: 3/12/2025 INDICATION: redness, swelling COMPARISON: None. TECHNIQUE: Venous Duplex ultrasound of bilateral lower extremities with and without compression, augmentation and duplex. Color flow and spectral Doppler with waveform analysis performed. FINDINGS: Exam includes the common femoral, femoral, popliteal veins as well as segmentally visualized deep calf veins and greater saphenous vein. RIGHT: No deep vein thrombosis. No superficial thrombophlebitis. No popliteal cyst. LEFT: No deep vein thrombosis. No superficial thrombophlebitis. No popliteal cyst.     IMPRESSION: 1.  No deep venous thrombosis in the bilateral lower extremities. 2.  Suboptimal exam due to patient body habitus and edema. Patient declined unwrapping of the calves due to open  wounds. Calf vessels only partially visualized but negative for thrombus were seen.      Data reviewed today: I reviewed all medications, new labs and imaging results over the last 24 hours. I personally reviewed the CT leg image(s) showing no abscess .  The patient's care was discussed with the Patient.and family

## 2025-03-15 NOTE — PLAN OF CARE
Problem: Adult Inpatient Plan of Care  Goal: Absence of Hospital-Acquired Illness or Injury  Intervention: Prevent Infection  Recent Flowsheet Documentation  Taken 3/14/2025 1630 by Catie Moon RN  Infection Prevention: hand hygiene promoted     Problem: Adult Inpatient Plan of Care  Goal: Optimal Comfort and Wellbeing  Intervention: Monitor Pain and Promote Comfort  Recent Flowsheet Documentation  Taken 3/14/2025 1630 by Catie Moon, RN  Pain Management Interventions: medication (see MAR)   Goal Outcome Evaluation:       Pt A&Ox4, not oob this shift. Purewick in place and bedpan provided. Reported pain in RLE, LLE, PRN dilaudid and tylenol given. Lymphedema wraps on LLE. Dressing on RLE. Sacral mepilex C/D/I. Lost IV access PICC paged, placed a new one which also went bad. Waiting for IV access to complete IV abx. Uses call light appropriately. Catie Moon, RN

## 2025-03-15 NOTE — PHARMACY-VANCOMYCIN DOSING SERVICE
Pharmacy Vancomycin Note  Date of Service March 15, 2025  Patient's  1945   79 year old, female    Indication: Skin and Soft Tissue Infection  Day of Therapy:4   Current vancomycin regimen:  750 mg IV q24h  Current vancomycin monitoring method: AUC  Current vancomycin therapeutic monitoring goal: 400-600 mg*h/L    [employer] Prediction of Current Vancomycin Regimen  Loading dose: N/A  Regimen: 750 mg IV every 24 hours.  Start time: 23:04 on 03/15/2025  Exposure target: AUC24 (range)400-600 mg/L.hr   AUC24,ss: 209 mg/L.hr  Probability of AUC24 > 400: 0 %  Ctrough,ss: 4.6 mg/L  Probability of Ctrough,ss > 20: 2 %  Probability of nephrotoxicity (Lodise LOUIS ): 3 %      Current estimated CrCl = Estimated Creatinine Clearance: 52.7 mL/min (A) (based on SCr of 1.04 mg/dL (H)).    Creatinine for last 3 days  3/12/2025:  4:00 PM Creatinine 1.62 mg/dL  3/13/2025:  8:14 AM Creatinine 1.29 mg/dL  3/14/2025:  6:11 AM Creatinine 1.26 mg/dL  3/15/2025:  6:33 AM Creatinine 1.04 mg/dL    Recent Vancomycin Levels (past 3 days)  3/15/2025:  6:33 AM Vancomycin 11.1 ug/mL    Vancomycin IV Administrations (past 72 hours)                     vancomycin (VANCOCIN) 750 mg in 0.9% NaCl 257.5 mL intermittent infusion (mg) 750 mg $Started 25 230     750 mg New Bag 25 1635    vancomycin (VANCOCIN) 2,000 mg in 0.9% NaCl 520 mL intermittent infusion (mg) 2,000 mg New Bag 25 1602                    Nephrotoxins and other renal medications (From now, onward)      Start     Dose/Rate Route Frequency Ordered Stop    03/15/25 0800  vancomycin (VANCOCIN) 750 mg in 0.9% NaCl 257.5 mL intermittent infusion         750 mg  over 60 Minutes Intravenous EVERY 12 HOURS 03/15/25 0728      25 0800  [Held by provider]  furosemide (LASIX) tablet 20 mg        (On hold since Wed 3/12/2025 at 2139 until manually unheld; held by Edna Stovall PA-CHold Reason: Other)   Note to Pharmacy: PTA Sig:Take 20 mg by mouth 2 times daily.       "20 mg Oral 2 TIMES DAILY (Diuretics and Nitrates) 03/12/25 2139      03/13/25 0000  piperacillin-tazobactam (ZOSYN) 3.375 g vial to attach to  mL bag        Note to Pharmacy: For [facility], [facility] and [facility]: For Zosyn-naive patients, use the \"Zosyn initial dose + extended infusion\" order panel.    3.375 g  over 240 Minutes Intravenous EVERY 8 HOURS 03/12/25 2034                 Contrast Orders - past 72 hours (72h ago, onward)      Start     Dose/Rate Route Frequency Stop    03/13/25 2030  iopamidol (ISOVUE-370) solution 75 mL         75 mL Intravenous ONCE 03/13/25 2010            Interpretation of levels and current regimen:  Vancomycin level is reflective of AUC less than 400    Has serum creatinine changed greater than 50% in last 72 hours: Yes    Renal Function: Improving    [employer] Prediction of Planned New Vancomycin Regimen  Loading dose: N/A  Regimen: 750 mg IV every 12 hours.  Start time: 23:04 on 03/15/2025  Exposure target: AUC24 (range)400-600 mg/L.hr   AUC24,ss: 415 mg/L.hr  Probability of AUC24 > 400: 70 %  Ctrough,ss: 12.7 mg/L  Probability of Ctrough,ss > 20: 7 %  Probability of nephrotoxicity (Lodise LOUIS 2009): 8 %      Plan:  Increase Dose to 750 mg q12h   Vancomycin monitoring method: AUC  Vancomycin therapeutic monitoring goal: 400-600 mg*h/L  Pharmacy will check vancomycin levels as appropriate in 1-3 Days.  Serum creatinine levels will be ordered daily for the first week of therapy and at least twice weekly for subsequent weeks.    TALA GALEANO RPH  "

## 2025-03-15 NOTE — PROGRESS NOTES
[facility]    Medicine Progress Note - Hospitalist Service    Date of Admission:  3/12/2025    Assessment & Plan     Elizabeth Kelley is a 79 year old female with history of recurrent right lower extremity cellulitis, chronic lower extremity lymphedema, and chronic venous stasis,  who is admitted for LEFT lower extremity cellulitis.     Left Lower Extremity Cellulitis  Presents with LEFT lower extremity erythema, edema.  Admitted 1/12 - 1/18/2025 for RIGHT lower extremity cellulitis and sepsis.  She was treated with meropenem and vancomycin, then transitioned to doxycycline.  Had elevated procalcitonin, CRP and leukocytosis.   Ultrasound negative for DVT.  CT scan of the left thigh shows no abscess and no osteomyelitis.   -- Initiated Vanco/Zosyn in the ER. Continue. Symptoms and CRP slowly improving.  -- ID consulted and input appreciated  -- Follow-up blood cultures  -- Pain control with Tylenol, Dilaudid as needed    KIKE  Cr 1.62, baseline appears around 1. Returned to baseline currently. Resume PTA Lasix and spironolactone.    Chronic Bilateral Lower Extremity Lymphedema, Chronic Venous Stasis  -- Spironolactone/Lasix as above    -- PT for lymphedema wraps   -- WOC consulted      Chronic stable conditions:   Chronic Anemia: Hgb 7.9. baseline appears around 8  Restless leg syndrome - continue PTA ropinirole  Anxiety depression - continue PTA Wellbutrin and Celexa  Hyperlipidemia - continue PTA statin   GERD - continue PTA PPI  Obesity: BMI 48.          Diet: Combination Diet Regular Diet Adult    DVT Prophylaxis: Heparin SQ  Amato Catheter: Not present  Lines: None     Cardiac Monitoring: None  Code Status: Full Code      Clinically Significant Risk Factors               # Hypoalbuminemia: Lowest albumin = 2.4 g/dL at 3/13/2025  8:14 AM, will monitor as appropriate     # Hypertension: Noted on problem list              # Severe Obesity: Estimated body mass index is 49.94 kg/m  as  "calculated from the following:    Height as of this encounter: 1.524 m (5').    Weight as of this encounter: 116 kg (255 lb 11.7 oz)., PRESENT ON ADMISSION       # Financial/Environmental Concerns: none;other (see comments) (Significant other states, \"without me she would be living on the streets and be unable to afford food, but since I still work full time we do just fine. Her  left her and kicked her out of the house about 15 years ago\".)         Social Drivers of Health    Tobacco Use: Medium Risk (10/21/2024)    Patient History     Smoking Tobacco Use: Former     Smokeless Tobacco Use: Never          Disposition Plan     Medically Ready for Discharge: Anticipated in 2-4 Days          Vincent Weinberg MD  Hospitalist Service  [facility]  Securely message with [email or web address] (more info)  Text page via [employer] Paging/Directory   ______________________________________________________________________    Interval History   Patient reports that the erythema and swelling of her right leg have some improvement today. Se remains having significant pain from her right leg. Her left leg also hurts during wound care.     Physical Exam   Vital Signs: Temp: 98.5  F (36.9  C) Temp src: Oral BP: 124/57 Pulse: 82   Resp: 18 SpO2: 92 % O2 Device: None (Room air)    Weight: 255 lbs 11.74 oz    General appearance: not in acute distress  HEENT: PERRL, EOMI  Lungs: Clear breath sounds in bilateral lung fields  Cardiovascular: Regular rate and rhythm, normal S1-S2  Abdomen: Soft, non tender, no distension  Musculoskeletal: No joint swelling  Left leg: erythema and swelling from ankle to the thigh with tenderness to palpation  Right leg: superficial wound in lower leg. No erythema and no swelling.  Skin: Bilateral lower extremity lymphedema  Neurology: AAO ×3.  Cranial nerves II - XII normal.  Normal muscle strength in all four extremities.     Medical Decision Making       48 MINUTES SPENT BY ME on the date of " service doing chart review, history, exam, documentation & further activities per the note.      Data     I have personally reviewed the following data over the past 24 hrs:    11.8 (H)  \   7.0 (L)   / 345     N/A N/A N/A /  N/A   N/A N/A 1.04 (H) \     Procal: N/A CRP: 299.40 (H) Lactic Acid: N/A         Imaging results reviewed over the past 24 hrs:   No results found for this or any previous visit (from the past 24 hours).

## 2025-03-16 ENCOUNTER — APPOINTMENT (OUTPATIENT)
Dept: OCCUPATIONAL THERAPY | Facility: HOSPITAL | Age: 80
End: 2025-03-16
Payer: COMMERCIAL

## 2025-03-16 LAB
CREAT SERPL-MCNC: 1.1 MG/DL (ref 0.51–0.95)
EGFRCR SERPLBLD CKD-EPI 2021: 51 ML/MIN/1.73M2
HOLD SPECIMEN: NORMAL

## 2025-03-16 PROCEDURE — 250N000011 HC RX IP 250 OP 636: Performed by: INTERNAL MEDICINE

## 2025-03-16 PROCEDURE — 250N000013 HC RX MED GY IP 250 OP 250 PS 637

## 2025-03-16 PROCEDURE — 250N000011 HC RX IP 250 OP 636

## 2025-03-16 PROCEDURE — 99232 SBSQ HOSP IP/OBS MODERATE 35: CPT | Performed by: INTERNAL MEDICINE

## 2025-03-16 PROCEDURE — 120N000001 HC R&B MED SURG/OB

## 2025-03-16 PROCEDURE — 36415 COLL VENOUS BLD VENIPUNCTURE: CPT | Performed by: INTERNAL MEDICINE

## 2025-03-16 PROCEDURE — 250N000013 HC RX MED GY IP 250 OP 250 PS 637: Performed by: INTERNAL MEDICINE

## 2025-03-16 PROCEDURE — 97535 SELF CARE MNGMENT TRAINING: CPT | Mod: GO

## 2025-03-16 PROCEDURE — 258N000003 HC RX IP 258 OP 636: Performed by: INTERNAL MEDICINE

## 2025-03-16 PROCEDURE — 82565 ASSAY OF CREATININE: CPT | Performed by: INTERNAL MEDICINE

## 2025-03-16 RX ORDER — HYDROCORTISONE 10 MG/ML
LOTION TOPICAL 2 TIMES DAILY
Status: COMPLETED | OUTPATIENT
Start: 2025-03-16 | End: 2025-03-19

## 2025-03-16 RX ADMIN — HEPARIN SODIUM 5000 UNITS: 5000 INJECTION, SOLUTION INTRAVENOUS; SUBCUTANEOUS at 14:01

## 2025-03-16 RX ADMIN — SODIUM CHLORIDE 750 MG: 0.9 INJECTION, SOLUTION INTRAVENOUS at 08:42

## 2025-03-16 RX ADMIN — GABAPENTIN 100 MG: 100 CAPSULE ORAL at 14:01

## 2025-03-16 RX ADMIN — BUPROPION HYDROCHLORIDE 150 MG: 150 TABLET, FILM COATED, EXTENDED RELEASE ORAL at 08:35

## 2025-03-16 RX ADMIN — FUROSEMIDE 20 MG: 20 TABLET ORAL at 16:35

## 2025-03-16 RX ADMIN — SODIUM CHLORIDE 750 MG: 0.9 INJECTION, SOLUTION INTRAVENOUS at 20:00

## 2025-03-16 RX ADMIN — PIPERACILLIN AND TAZOBACTAM 3.38 G: 3; .375 INJECTION, POWDER, FOR SOLUTION INTRAVENOUS at 00:04

## 2025-03-16 RX ADMIN — PIPERACILLIN AND TAZOBACTAM 3.38 G: 3; .375 INJECTION, POWDER, FOR SOLUTION INTRAVENOUS at 08:36

## 2025-03-16 RX ADMIN — ROPINIROLE HYDROCHLORIDE 1 MG: 1 TABLET, FILM COATED ORAL at 22:01

## 2025-03-16 RX ADMIN — CITALOPRAM HYDROBROMIDE 40 MG: 40 TABLET, FILM COATED ORAL at 14:00

## 2025-03-16 RX ADMIN — ACETAMINOPHEN 650 MG: 325 TABLET ORAL at 00:07

## 2025-03-16 RX ADMIN — SIMVASTATIN 40 MG: 40 TABLET, FILM COATED ORAL at 22:01

## 2025-03-16 RX ADMIN — ROPINIROLE HYDROCHLORIDE 0.5 MG: 0.25 TABLET, FILM COATED ORAL at 08:35

## 2025-03-16 RX ADMIN — GABAPENTIN 100 MG: 100 CAPSULE ORAL at 19:39

## 2025-03-16 RX ADMIN — ACETAMINOPHEN 650 MG: 325 TABLET ORAL at 05:14

## 2025-03-16 RX ADMIN — PANTOPRAZOLE SODIUM 40 MG: 40 TABLET, DELAYED RELEASE ORAL at 08:34

## 2025-03-16 RX ADMIN — HEPARIN SODIUM 5000 UNITS: 5000 INJECTION, SOLUTION INTRAVENOUS; SUBCUTANEOUS at 22:01

## 2025-03-16 RX ADMIN — FUROSEMIDE 20 MG: 20 TABLET ORAL at 08:34

## 2025-03-16 RX ADMIN — GABAPENTIN 100 MG: 100 CAPSULE ORAL at 08:34

## 2025-03-16 RX ADMIN — ASPIRIN 81 MG CHEWABLE TABLET 81 MG: 81 TABLET CHEWABLE at 08:34

## 2025-03-16 RX ADMIN — ROPINIROLE HYDROCHLORIDE 0.5 MG: 0.25 TABLET, FILM COATED ORAL at 16:35

## 2025-03-16 RX ADMIN — SPIRONOLACTONE 25 MG: 25 TABLET, FILM COATED ORAL at 08:34

## 2025-03-16 RX ADMIN — HEPARIN SODIUM 5000 UNITS: 5000 INJECTION, SOLUTION INTRAVENOUS; SUBCUTANEOUS at 05:14

## 2025-03-16 RX ADMIN — PIPERACILLIN AND TAZOBACTAM 3.38 G: 3; .375 INJECTION, POWDER, FOR SOLUTION INTRAVENOUS at 23:59

## 2025-03-16 RX ADMIN — HYDROMORPHONE HYDROCHLORIDE 2 MG: 2 TABLET ORAL at 19:39

## 2025-03-16 RX ADMIN — ACETAMINOPHEN 650 MG: 325 TABLET ORAL at 14:00

## 2025-03-16 RX ADMIN — HYDROMORPHONE HYDROCHLORIDE 2 MG: 2 TABLET ORAL at 23:59

## 2025-03-16 RX ADMIN — HYDROCORTISONE: 1.14 LOTION TOPICAL at 20:00

## 2025-03-16 RX ADMIN — HYDROMORPHONE HYDROCHLORIDE 2 MG: 2 TABLET ORAL at 14:01

## 2025-03-16 RX ADMIN — HYDROMORPHONE HYDROCHLORIDE 2 MG: 2 TABLET ORAL at 08:41

## 2025-03-16 RX ADMIN — PIPERACILLIN AND TAZOBACTAM 3.38 G: 3; .375 INJECTION, POWDER, FOR SOLUTION INTRAVENOUS at 16:36

## 2025-03-16 ASSESSMENT — ACTIVITIES OF DAILY LIVING (ADL)
ADLS_ACUITY_SCORE: 50

## 2025-03-16 NOTE — PLAN OF CARE
Problem: Skin or Soft Tissue Infection  Goal: Absence of Infection Signs and Symptoms  Intervention: Minimize and Manage Infection Progression  Recent Flowsheet Documentation  Taken 3/16/2025 0007 by Ct Joseph RN  Infection Prevention: hand hygiene promoted  Isolation Precautions: contact precautions maintained     Problem: Pain Acute  Goal: Optimal Pain Control and Function  Outcome: Progressing   Goal Outcome Evaluation:    Afebrile. Continue IV antibiotics. Edematous lower extremities, elevated on pillows.  Complained of bilateral LE pain- PRN tylenol given x 2.

## 2025-03-16 NOTE — PROGRESS NOTES
[facility]    Medicine Progress Note - Hospitalist Service    Date of Admission:  3/12/2025    Assessment & Plan     Elizabeth Kelley is a 79 year old female with history of recurrent right lower extremity cellulitis, chronic lower extremity lymphedema, and chronic venous stasis,  who is admitted for LEFT lower extremity cellulitis.     Left Lower Extremity Cellulitis  Presents with LEFT lower extremity erythema, edema.  Admitted 1/12 - 1/18/2025 for RIGHT lower extremity cellulitis and sepsis.  She was treated with meropenem and vancomycin, then transitioned to doxycycline.  Had elevated procalcitonin, CRP and leukocytosis.   Ultrasound negative for DVT.  CT scan of the left thigh shows no abscess and no osteomyelitis.   -- Initiated Vanco/Zosyn in the ER. Continue. Symptoms and CRP slowly improving.  -- ID consulted and input appreciated. Will reevaluate tomorrow.   -- Follow-up blood cultures  -- Pain control with Tylenol, Dilaudid as needed    KIKE  Cr 1.62, baseline appears around 1. Returned to baseline currently. Resume PTA Lasix and spironolactone.    Skin rash on left thigh:  She developed some itchy skin rash on the lateral side of her left thigh today. No new skin rash in other body area. She is agreeable to try some topical hydrocortisone.     Chronic Bilateral Lower Extremity Lymphedema, Chronic Venous Stasis  -- Spironolactone/Lasix as above    -- PT for lymphedema wraps   -- WOC consulted      Chronic stable conditions:   Chronic Anemia: Hgb 7.9. baseline appears around 8  Restless leg syndrome - continue PTA ropinirole  Anxiety depression - continue PTA Wellbutrin and Celexa  Hyperlipidemia - continue PTA statin   GERD - continue PTA PPI  Obesity: BMI 48.          Diet: Combination Diet Regular Diet Adult    DVT Prophylaxis: Heparin SQ  Amato Catheter: Not present  Lines: None     Cardiac Monitoring: None  Code Status: Full Code      Clinically Significant Risk Factors           "     # Hypoalbuminemia: Lowest albumin = 2.4 g/dL at 3/13/2025  8:14 AM, will monitor as appropriate     # Hypertension: Noted on problem list              # Severe Obesity: Estimated body mass index is 49.94 kg/m  as calculated from the following:    Height as of this encounter: 1.524 m (5').    Weight as of this encounter: 116 kg (255 lb 11.7 oz)., PRESENT ON ADMISSION       # Financial/Environmental Concerns: none;other (see comments) (Significant other states, \"without me she would be living on the streets and be unable to afford food, but since I still work full time we do just fine. Her  left her and kicked her out of the house about 15 years ago\".)         Social Drivers of Health    Tobacco Use: Medium Risk (10/21/2024)    Patient History     Smoking Tobacco Use: Former     Smokeless Tobacco Use: Never          Disposition Plan     Medically Ready for Discharge: Anticipated in 2-4 Days          Vincent Weinberg MD  Hospitalist Service  [facility]  Securely message with [employer] (more info)  Text page via [employer] Paging/Directory   ______________________________________________________________________    Interval History   Patient reports that the erythema, pain and swelling of her left leg have some improvement today. She continues to have a lot of pain from her right lower leg, especially during the wound care.     Physical Exam   Vital Signs: Temp: 97.7  F (36.5  C) Temp src: Oral BP: 115/56 Pulse: 84   Resp: 18 SpO2: 97 % O2 Device: None (Room air)    Weight: 255 lbs 11.74 oz    General appearance: not in acute distress  HEENT: PERRL, EOMI  Lungs: Clear breath sounds in bilateral lung fields  Cardiovascular: Regular rate and rhythm, normal S1-S2  Abdomen: Soft, non tender, no distension  Musculoskeletal: No joint swelling  Left leg: erythema and swelling from ankle to the thigh with tenderness to palpation. New erythematous skin rash on the lateral side of the left thigh.   Right " leg: superficial wound in lower leg. No erythema and no swelling.  Skin: Bilateral lower extremity lymphedema  Neurology: AAO ×3.  Cranial nerves II - XII normal.  Normal muscle strength in all four extremities.     Medical Decision Making       48 MINUTES SPENT BY ME on the date of service doing chart review, history, exam, documentation & further activities per the note.      Data     I have personally reviewed the following data over the past 24 hrs:    N/A  \   N/A   / N/A     N/A N/A N/A /  N/A   N/A N/A 1.10 (H) \       Imaging results reviewed over the past 24 hrs:   No results found for this or any previous visit (from the past 24 hours).

## 2025-03-16 NOTE — PLAN OF CARE
Problem: Adult Inpatient Plan of Care  Goal: Plan of Care Review  Description: The Plan of Care Review/Shift note should be completed every shift.  The Outcome Evaluation is a brief statement about your assessment that the patient is improving, declining, or no change.  This information will be displayed automatically on your shift  note.  Outcome: Progressing  Flowsheets (Taken 3/15/2025 2345)  Plan of Care Reviewed With: patient  Overall Patient Progress: no change     Problem: Pain Acute  Goal: Optimal Pain Control and Function  Outcome: Progressing  Intervention: Develop Pain Management Plan  Recent Flowsheet Documentation  Taken 3/15/2025 2041 by Cyrus Peters RN  Pain Management Interventions: (scheduled gabapentin)   medication (see MAR)   pain management plan reviewed with patient/caregiver  Intervention: Prevent or Manage Pain  Recent Flowsheet Documentation  Taken 3/15/2025 2054 by Cyrus Peters RN  Bowel Elimination Promotion: privacy promoted  Medication Review/Management: medications reviewed   Goal Outcome Evaluation:      Plan of Care Reviewed With: patient    Overall Patient Progress: no changeOverall Patient Progress: no change    Pt is Aox4. Pt reported pain 6/10 on BLE, scheduled Gabapentin given, pt refused PRN. Pt later nauseated and emesis noted due to frequent cough. Prn zofran and tessalon given. Pt reported symptoms improved. No other concern at this time.     Kassandra Peters RN.

## 2025-03-17 ENCOUNTER — APPOINTMENT (OUTPATIENT)
Dept: OCCUPATIONAL THERAPY | Facility: HOSPITAL | Age: 80
End: 2025-03-17
Payer: COMMERCIAL

## 2025-03-17 ENCOUNTER — APPOINTMENT (OUTPATIENT)
Dept: PHYSICAL THERAPY | Facility: HOSPITAL | Age: 80
End: 2025-03-17
Payer: COMMERCIAL

## 2025-03-17 LAB
BACTERIA BLD CULT: NO GROWTH
CREAT SERPL-MCNC: 1.13 MG/DL (ref 0.51–0.95)
CRP SERPL-MCNC: 206.5 MG/L
EGFRCR SERPLBLD CKD-EPI 2021: 50 ML/MIN/1.73M2
VANCOMYCIN SERPL-MCNC: 14.7 UG/ML
WBC # BLD AUTO: 14 10E3/UL (ref 4–11)

## 2025-03-17 PROCEDURE — 85048 AUTOMATED LEUKOCYTE COUNT: CPT | Performed by: INTERNAL MEDICINE

## 2025-03-17 PROCEDURE — 250N000013 HC RX MED GY IP 250 OP 250 PS 637: Performed by: INTERNAL MEDICINE

## 2025-03-17 PROCEDURE — 82565 ASSAY OF CREATININE: CPT | Performed by: INTERNAL MEDICINE

## 2025-03-17 PROCEDURE — 250N000011 HC RX IP 250 OP 636: Performed by: INTERNAL MEDICINE

## 2025-03-17 PROCEDURE — 36415 COLL VENOUS BLD VENIPUNCTURE: CPT | Performed by: INTERNAL MEDICINE

## 2025-03-17 PROCEDURE — 97530 THERAPEUTIC ACTIVITIES: CPT | Mod: GP

## 2025-03-17 PROCEDURE — 250N000011 HC RX IP 250 OP 636

## 2025-03-17 PROCEDURE — 80202 ASSAY OF VANCOMYCIN: CPT | Performed by: INTERNAL MEDICINE

## 2025-03-17 PROCEDURE — 250N000013 HC RX MED GY IP 250 OP 250 PS 637

## 2025-03-17 PROCEDURE — 86140 C-REACTIVE PROTEIN: CPT | Performed by: INTERNAL MEDICINE

## 2025-03-17 PROCEDURE — 99233 SBSQ HOSP IP/OBS HIGH 50: CPT | Performed by: INTERNAL MEDICINE

## 2025-03-17 PROCEDURE — 97535 SELF CARE MNGMENT TRAINING: CPT | Mod: GO

## 2025-03-17 PROCEDURE — 258N000003 HC RX IP 258 OP 636: Performed by: INTERNAL MEDICINE

## 2025-03-17 PROCEDURE — 120N000001 HC R&B MED SURG/OB

## 2025-03-17 PROCEDURE — 99232 SBSQ HOSP IP/OBS MODERATE 35: CPT | Performed by: INTERNAL MEDICINE

## 2025-03-17 RX ORDER — CEFTRIAXONE 2 G/1
2 INJECTION, POWDER, FOR SOLUTION INTRAMUSCULAR; INTRAVENOUS EVERY 24 HOURS
Status: DISCONTINUED | OUTPATIENT
Start: 2025-03-17 | End: 2025-03-23

## 2025-03-17 RX ADMIN — BUPROPION HYDROCHLORIDE 150 MG: 150 TABLET, FILM COATED, EXTENDED RELEASE ORAL at 07:56

## 2025-03-17 RX ADMIN — SIMVASTATIN 40 MG: 40 TABLET, FILM COATED ORAL at 21:29

## 2025-03-17 RX ADMIN — ASPIRIN 81 MG CHEWABLE TABLET 81 MG: 81 TABLET CHEWABLE at 07:56

## 2025-03-17 RX ADMIN — ROPINIROLE HYDROCHLORIDE 0.5 MG: 0.25 TABLET, FILM COATED ORAL at 07:55

## 2025-03-17 RX ADMIN — ROPINIROLE HYDROCHLORIDE 0.5 MG: 0.25 TABLET, FILM COATED ORAL at 16:25

## 2025-03-17 RX ADMIN — GABAPENTIN 100 MG: 100 CAPSULE ORAL at 21:29

## 2025-03-17 RX ADMIN — HYDROCORTISONE: 1.14 LOTION TOPICAL at 08:03

## 2025-03-17 RX ADMIN — FUROSEMIDE 20 MG: 20 TABLET ORAL at 16:26

## 2025-03-17 RX ADMIN — ACETAMINOPHEN 650 MG: 325 TABLET ORAL at 04:24

## 2025-03-17 RX ADMIN — ACETAMINOPHEN 650 MG: 325 TABLET ORAL at 08:40

## 2025-03-17 RX ADMIN — FUROSEMIDE 20 MG: 20 TABLET ORAL at 07:56

## 2025-03-17 RX ADMIN — BENZONATATE 100 MG: 100 CAPSULE ORAL at 00:04

## 2025-03-17 RX ADMIN — ACETAMINOPHEN 650 MG: 325 TABLET ORAL at 14:12

## 2025-03-17 RX ADMIN — SPIRONOLACTONE 25 MG: 25 TABLET, FILM COATED ORAL at 07:56

## 2025-03-17 RX ADMIN — HYDROMORPHONE HYDROCHLORIDE 2 MG: 2 TABLET ORAL at 21:36

## 2025-03-17 RX ADMIN — PANTOPRAZOLE SODIUM 40 MG: 40 TABLET, DELAYED RELEASE ORAL at 07:56

## 2025-03-17 RX ADMIN — GABAPENTIN 100 MG: 100 CAPSULE ORAL at 07:55

## 2025-03-17 RX ADMIN — ACETAMINOPHEN 650 MG: 325 TABLET ORAL at 00:00

## 2025-03-17 RX ADMIN — HYDROMORPHONE HYDROCHLORIDE 2 MG: 2 TABLET ORAL at 08:40

## 2025-03-17 RX ADMIN — SODIUM CHLORIDE 750 MG: 0.9 INJECTION, SOLUTION INTRAVENOUS at 07:56

## 2025-03-17 RX ADMIN — HEPARIN SODIUM 5000 UNITS: 5000 INJECTION, SOLUTION INTRAVENOUS; SUBCUTANEOUS at 14:12

## 2025-03-17 RX ADMIN — ROPINIROLE HYDROCHLORIDE 1 MG: 1 TABLET, FILM COATED ORAL at 21:29

## 2025-03-17 RX ADMIN — HYDROMORPHONE HYDROCHLORIDE 2 MG: 2 TABLET ORAL at 15:11

## 2025-03-17 RX ADMIN — CITALOPRAM HYDROBROMIDE 40 MG: 40 TABLET, FILM COATED ORAL at 11:56

## 2025-03-17 RX ADMIN — HEPARIN SODIUM 5000 UNITS: 5000 INJECTION, SOLUTION INTRAVENOUS; SUBCUTANEOUS at 21:38

## 2025-03-17 RX ADMIN — HEPARIN SODIUM 5000 UNITS: 5000 INJECTION, SOLUTION INTRAVENOUS; SUBCUTANEOUS at 06:14

## 2025-03-17 RX ADMIN — HYDROMORPHONE HYDROCHLORIDE 2 MG: 2 TABLET ORAL at 04:24

## 2025-03-17 RX ADMIN — HYDROCORTISONE: 1.14 LOTION TOPICAL at 21:36

## 2025-03-17 RX ADMIN — GABAPENTIN 100 MG: 100 CAPSULE ORAL at 14:12

## 2025-03-17 RX ADMIN — SODIUM CHLORIDE 750 MG: 0.9 INJECTION, SOLUTION INTRAVENOUS at 21:29

## 2025-03-17 RX ADMIN — PIPERACILLIN AND TAZOBACTAM 3.38 G: 3; .375 INJECTION, POWDER, FOR SOLUTION INTRAVENOUS at 07:56

## 2025-03-17 RX ADMIN — CEFTRIAXONE SODIUM 2 G: 2 INJECTION, POWDER, FOR SOLUTION INTRAMUSCULAR; INTRAVENOUS at 16:21

## 2025-03-17 ASSESSMENT — ACTIVITIES OF DAILY LIVING (ADL)
ADLS_ACUITY_SCORE: 51
ADLS_ACUITY_SCORE: 51
ADLS_ACUITY_SCORE: 50
ADLS_ACUITY_SCORE: 49
ADLS_ACUITY_SCORE: 50
ADLS_ACUITY_SCORE: 49
ADLS_ACUITY_SCORE: 51
ADLS_ACUITY_SCORE: 50
ADLS_ACUITY_SCORE: 49
ADLS_ACUITY_SCORE: 51
ADLS_ACUITY_SCORE: 50
ADLS_ACUITY_SCORE: 49
ADLS_ACUITY_SCORE: 51
ADLS_ACUITY_SCORE: 50
ADLS_ACUITY_SCORE: 51
ADLS_ACUITY_SCORE: 51
ADLS_ACUITY_SCORE: 50
ADLS_ACUITY_SCORE: 51
ADLS_ACUITY_SCORE: 50
ADLS_ACUITY_SCORE: 51
ADLS_ACUITY_SCORE: 50

## 2025-03-17 NOTE — PLAN OF CARE
"Goal Outcome Evaluation:      Goal Outcome Evaluation:        Problem: Adult Inpatient Plan of Care  Goal: Patient-Specific Goal (Individualized)  Description: You can add care plan individualizations to a care plan. Examples of Individualization might be:  \"Parent requests to be called daily at 9am for status\", \"I have a hard time hearing out of my right ear\", or \"Do not touch me to wake me up as it startles  me\".  Outcome: Progressing     Problem: Adult Inpatient Plan of Care  Goal: Optimal Comfort and Wellbeing  Outcome: Progressing     Problem: Delirium  Goal: Improved Behavioral Control  Outcome: Progressing     Problem: Delirium  Goal: Improved Attention and Thought Clarity  Outcome: Progressing     A/O x4. VSS. PRN Dilaudid and Tylenol were given for wound care which were effective per pt. Dressing change done to BLE wound. Good oral intake.                                           "

## 2025-03-17 NOTE — PLAN OF CARE
"Goal Outcome Evaluation:      Problem: Adult Inpatient Plan of Care  Goal: Patient-Specific Goal (Individualized)  Description: You can add care plan individualizations to a care plan. Examples of Individualization might be:  \"Parent requests to be called daily at 9am for status\", \"I have a hard time hearing out of my right ear\", or \"Do not touch me to wake me up as it startles  me\".  Outcome: Progressing     Problem: Adult Inpatient Plan of Care  Goal: Optimal Comfort and Wellbeing  Outcome: Progressing     Problem: Delirium  Goal: Improved Behavioral Control  Outcome: Progressing     Problem: Delirium  Goal: Improved Attention and Thought Clarity  Outcome: Progressing    A/O x4. VSS. PRN Dilaudid and Tylenol were given for wound care which were effective per pt. Dressing change done to BLE wound. Good oral intake.                            "

## 2025-03-17 NOTE — PHARMACY-VANCOMYCIN DOSING SERVICE
Pharmacy Vancomycin Note  Date of Service 2025  Patient's  1945   79 year old, female    Indication: Skin and Soft Tissue Infection  Day of Therapy: 6  Current vancomycin regimen:  750 mg IV q12h  Current vancomycin monitoring method: AUC  Current vancomycin therapeutic monitoring goal: 400-600 mg*h/L    [employer] Prediction of Current Vancomycin Regimen  Regimen: 750 mg IV every 12 hours.  Start time: 19:56 on 2025  Exposure target: AUC24 (range)400-600 mg/L.hr   AUC24,ss: 450 mg/L.hr  Probability of AUC24 > 400: 98 %  Ctrough,ss: 13.8 mg/L  Probability of Ctrough,ss > 20: 0 %  Probability of nephrotoxicity (Lodise LOUIS ): 9 %      Current estimated CrCl = Estimated Creatinine Clearance: 48.5 mL/min (A) (based on SCr of 1.13 mg/dL (H)).    Creatinine for last 3 days  3/15/2025:  6:33 AM Creatinine 1.04 mg/dL  3/16/2025:  6:33 AM Creatinine 1.10 mg/dL  3/17/2025:  6:26 AM Creatinine 1.13 mg/dL    Recent Vancomycin Levels (past 3 days)  3/15/2025:  6:33 AM Vancomycin 11.1 ug/mL  3/17/2025:  6:26 AM Vancomycin 14.7 ug/mL    Vancomycin IV Administrations (past 72 hours)                     vancomycin (VANCOCIN) 750 mg in 0.9% NaCl 257.5 mL intermittent infusion (mg) 750 mg New Bag 25 0756     750 mg New Bag 25 2000     750 mg New Bag  0842     750 mg New Bag 03/15/25 204     750 mg New Bag  0946    vancomycin (VANCOCIN) 750 mg in 0.9% NaCl 257.5 mL intermittent infusion (mg) 750 mg $Started 25 2304                    Nephrotoxins and other renal medications (From now, onward)      Start     Dose/Rate Route Frequency Ordered Stop    03/15/25 0800  vancomycin (VANCOCIN) 750 mg in 0.9% NaCl 257.5 mL intermittent infusion         750 mg  over 60 Minutes Intravenous EVERY 12 HOURS 03/15/25 0728      25 0800  furosemide (LASIX) tablet 20 mg        Note to Pharmacy: PTA Sig:Take 20 mg by mouth 2 times daily.      20 mg Oral 2 TIMES DAILY (Diuretics and Nitrates) 25  "2139      03/13/25 0000  piperacillin-tazobactam (ZOSYN) 3.375 g vial to attach to  mL bag        Note to Pharmacy: For [facility], [facility] and [facility]: For Zosyn-naive patients, use the \"Zosyn initial dose + extended infusion\" order panel.    3.375 g  over 240 Minutes Intravenous EVERY 8 HOURS 03/12/25 2034                 Contrast Orders - past 72 hours (72h ago, onward)      None            Interpretation of levels and current regimen:  Vancomycin level is reflective of -600    Has serum creatinine changed greater than 50% in last 72 hours: No    Urine output:  good urine output    Renal Function: Stable    Plan:  Continue Current Dose  Vancomycin monitoring method: AUC  Vancomycin therapeutic monitoring goal: 400-600 mg*h/L  Pharmacy will check vancomycin levels as appropriate in 3-5 Days.  Serum creatinine levels will be ordered daily for the first week of therapy and at least twice weekly for subsequent weeks.    Lis Hunter [facility]    "

## 2025-03-17 NOTE — PROGRESS NOTES
"Care Management Follow Up    Length of Stay (days): 5    Expected Discharge Date: 03/18/2025    Anticipated Discharge Plan:  Transitional Care    Transportation: TBD    PT Recommendations: Transitional Care Facility  OT Recommendations:  Transitional Care Facility     Barriers to Discharge: medical stability    Prior Living Situation: house (\"It is a very small house. There are a few steps to get inside the house. Then inside it is all 1 level.\") with significant other    Discussed  Partnership in Safe Discharge Planning  document with patient/family: No     Handoff Completed: No, handoff not indicated or clinically appropriate    Patient/Spokesperson Updated: Yes. Who? pt    Additional Information:  CM met with pt in room to discuss discharge planning. Therapy rec is TCU. Pt declines Tcu and states she believes she will be able to manage at him with her SO. CM discussed concern that pt is requiring assist of 1-2 currently and pt would not have two people to assist her at home. Pt adamant that she will be able to manage.     Pt to return home with Nivia blanco RN, PT, OT.     If pt still requiring assist of two at discharge, CM to file [facility] report if pt discharges home.     Angelica Jose, RADHA      "

## 2025-03-17 NOTE — PROGRESS NOTES
Infectious Diseases Progress Note  [facility]    Date of visit: 03/17/2025     ASSESSMENT   79-year-old woman with a history of chronic lymphedema, hyperlipidemia, GERD who was admitted for left leg cellulitis.    Left leg cellulitis.  Presenting with fevers then redness and swelling in the entire left leg up into the buttock.  No open lesions or fluctuant areas.  Limited left lower extremity ultrasound without DVT.  Presenting with leukocytosis and elevated inflammatory markers. Wbc and CRP slow improvement. CT left thigh/pelvis showing possible myositis/fasciitis, no abscess. Improving with antibiotics.    Chronic stasis dermatitis, right leg.  History of recurrent infections in the right leg due to open lesions.  Recently placed on minocycline for lifelong suppression.  (50 mg daily)      Principal Problem:    Cellulitis of left lower extremity  Active Problems:    Acquired lymphedema of leg    Morbid obesity (H)    Peripheral edema    KIKE (acute kidney injury)       PLAN   -Discontinue Pipercillin/tazobactam  -start ceftriaxone   -continue vancomycin   -trend CRP, WBC      Son Rodriguez MD  [facility]  Direct messaging: [employer] Paging   On-Call ID provider: [phone], option: 9         ===========================================      SUBJECTIVE / INTERVAL HISTORY:     No acute events. More pain yesterday in right leg. Wbc up today.       Antibiotics   Vancomycin 3/12-  Zosyn 3/12-    Previous:  Minocycline prior to arrival      Physical Exam     Temp:  [97.7  F (36.5  C)-99.6  F (37.6  C)] 98.1  F (36.7  C)  Pulse:  [68-88] 68  Resp:  [18-22] 18  BP: (110-132)/(52-60) 117/56  SpO2:  [86 %-98 %] 92 %    /56 (BP Location: Left arm)   Pulse 68   Temp 98.1  F (36.7  C) (Oral)   Resp 18   Ht 1.524 m (5')   Wt 116 kg (255 lb 11.7 oz)   SpO2 92%   BMI 49.94 kg/m      GENERAL:  well-developed, well-nourished, lying in bed in no acute distress.   HENT:  Head is  normocephalic, atraumatic.   EYES:  Eyes have anicteric sclerae without conjunctival injection   LUNGS:  normal respiratory pattern   EXT: Lower leg wrapped. Some swelling, erythema mostly dependent areas. Some blistering on lateral thigh, small  SKIN:  No acute rashes.   NEUROLOGIC:  Grossly nonfocal.      Cultures   3/12 blood cultures x 2: No growth to date    No results found for the last 90 days.       Laboratory results     Recent Labs   Lab 03/17/25  0626 03/15/25  0633 03/14/25  0611 03/13/25  0814   WBC 14.0* 11.8* 11.8* 14.6*   HGB  --  7.0* 7.1* 7.4*   PLT  --  345 305 300       Recent Labs   Lab 03/13/25  0814 03/12/25  1600    138   CO2 22 23   BUN 37.3* 49.7*   ALBUMIN 2.4* 2.6*   ALKPHOS 351* 300*   ALT 26 27   AST 21 15       Recent Labs   Lab 03/17/25  0626 03/15/25  0633 03/14/25  0611   CRPI 206.50* 299.40* 376.30*           Imaging   Radiology results reviewed    US Lower Extremity Venous Duplex Bilateral    Result Date: 3/12/2025  EXAM: US LOWER EXTREMITY VENOUS DUPLEX BILATERAL LOCATION: [facility] DATE: 3/12/2025 INDICATION: redness, swelling COMPARISON: None. TECHNIQUE: Venous Duplex ultrasound of bilateral lower extremities with and without compression, augmentation and duplex. Color flow and spectral Doppler with waveform analysis performed. FINDINGS: Exam includes the common femoral, femoral, popliteal veins as well as segmentally visualized deep calf veins and greater saphenous vein. RIGHT: No deep vein thrombosis. No superficial thrombophlebitis. No popliteal cyst. LEFT: No deep vein thrombosis. No superficial thrombophlebitis. No popliteal cyst.     IMPRESSION: 1.  No deep venous thrombosis in the bilateral lower extremities. 2.  Suboptimal exam due to patient body habitus and edema. Patient declined unwrapping of the calves due to open wounds. Calf vessels only partially visualized but negative for thrombus were seen.      Data reviewed today: I reviewed  all medications, new labs and imaging results over the last 24 hours. I personally reviewed no images or EKG's today.  The patient's care was discussed with the Patient

## 2025-03-17 NOTE — PROGRESS NOTES
[facility]    Medicine Progress Note - Hospitalist Service    Date of Admission:  3/12/2025    Assessment & Plan   Elizabeth Kelley is a 79 year old female with history of recurrent right lower extremity cellulitis, chronic lower extremity lymphedema, and chronic venous stasis,  who is admitted for LEFT lower extremity cellulitis.     Left Lower Extremity Cellulitis  Presents with LEFT lower extremity erythema, edema.  Admitted 1/12 - 1/18/2025 for RIGHT lower extremity cellulitis and sepsis.  She was treated with meropenem and vancomycin, then transitioned to doxycycline.  Has recently started suppression with minocycline 50 mg daily   Had elevated procalcitonin, CRP and leukocytosis.  ->206   Ultrasound negative for DVT.  CT scan of the left thigh shows no abscess and no osteomyelitis.   -- Initially on Vanco/Zosyn  -- Zosyn discontinued and ceftriaxone initiated 3/17 given rising WBC  -- ID consulted and input appreciated  -- Follow-up blood cultures  -- Pain control with Tylenol, Dilaudid as needed    KIKE - improving, creatinine 1.13 today  Cr 1.62, baseline appears around 1  -- Monitor     Chronic Bilateral Lower Extremity Lymphedema, Chronic Venous Stasis  -- Continue spironolactone/Lasix   -- PT for lymphedema wraps   -- WOC consulted    Generalized weakness  -- PT/OT are recommending TCU, patient has been declining so far    Chronic stable conditions:   Chronic Anemia: Hgb 7.9. baseline appears around 8  Restless leg syndrome - continue PTA ropinirole  Anxiety depression - continue PTA Wellbutrin and Celexa  Hyperlipidemia - continue PTA statin   GERD - continue PTA PPI  Obesity: BMI 48.          Diet: Combination Diet Regular Diet Adult    DVT Prophylaxis: Heparin SQ  Amato Catheter: Not present  Lines: None     Cardiac Monitoring: None  Code Status: Full Code      Clinically Significant Risk Factors               # Hypoalbuminemia: Lowest albumin = 2.4 g/dL at 3/13/2025  8:14  "AM, will monitor as appropriate     # Hypertension: Noted on problem list            # Severe Obesity: Estimated body mass index is 49.94 kg/m  as calculated from the following:    Height as of this encounter: 1.524 m (5').    Weight as of this encounter: 116 kg (255 lb 11.7 oz).      # Financial/Environmental Concerns: none;other (see comments) (Significant other states, \"without me she would be living on the streets and be unable to afford food, but since I still work full time we do just fine. Her  left her and kicked her out of the house about 15 years ago\".)         Social Drivers of Health    Tobacco Use: Medium Risk (10/21/2024)    Patient History     Smoking Tobacco Use: Former     Smokeless Tobacco Use: Never          Disposition Plan     Medically Ready for Discharge: Anticipated in 2-4 Days             Christie Maldonado MD  Hospitalist Service  [facility]  Securely message with [employer] (more info)  Text page via [employer] Paging/Directory   ______________________________________________________________________    Interval History   Patient reports some improvement in left lower extremity pain and swelling since admission  Tolerates antibiotics well, no nausea or diarrhea  Has been declining TCU, states she has all the equipment that she needs and support at home    Physical Exam   Vital Signs: Temp: 98.2  F (36.8  C) Temp src: Oral BP: 124/58 Pulse: 76   Resp: 20 SpO2: 92 % O2 Device: Nasal cannula Oxygen Delivery: 2 LPM  Weight: 255 lbs 11.74 oz    General Appearance: NAD, severely obese female   Respiratory: Easy respirations  Extremities: Bilateral lower extremity edema, left lower extremity: Lower leg is wrapped, erythema extending up to the knee, there is some blistering on the lateral side, not dermatome distribution  Other: Alert, grossly nonfocal    Medical Decision Making       50 MINUTES SPENT BY ME on the date of service doing chart review, history, exam, " documentation & further activities per the note.  MANAGEMENT DISCUSSED with the following over the past 24 hours: Patient, nursing staff, Dr. Rodriguez from ID       Data     I have personally reviewed the following data over the past 24 hrs:    14.0 (H)  \   N/A   / N/A     N/A N/A N/A /  N/A   N/A N/A 1.13 (H) \     Procal: N/A CRP: 206.50 (H) Lactic Acid: N/A         Imaging results reviewed over the past 24 hrs:   No results found for this or any previous visit (from the past 24 hours).

## 2025-03-17 NOTE — PLAN OF CARE
Goal Outcome Evaluation:      Plan of Care Reviewed With: patient    Overall Patient Progress: improvingOverall Patient Progress: improving     Patient alert and oriented x4. Purewick in place. Patient declined dressing change stating it was done last night late and dressing has no visible drainage. Encouraged patient to frequently adjust position in bed to prevent pressure injury. Dilaudid given x1 this shift.

## 2025-03-17 NOTE — PLAN OF CARE
Problem: Pain Acute  Goal: Optimal Pain Control and Function  Outcome: Progressing     Problem: Skin or Soft Tissue Infection  Goal: Absence of Infection Signs and Symptoms  Outcome: Progressing   Goal Outcome Evaluation:    Complained of bilateral LE pain- 7 out of 10, worse with movement. - dilaudid and tylenol given- helpful per patient.   Has a low grade fever- 99.2 and 99.6.- patient has received her PRN tylenol for pain. Continue IV antibiotics.   Has mild dry and frequent cough- Tessalon given  Refused to be repositioned due to bilateral LE discomfort. Advised patient to shift weight.   86% on room air- placed on 2 liters/NC-92 to 93%. Denies any shortness of breath.  Had 1 incontinent urine,even with the purewick, incontinence care done.   Blisters still present in patient's lateral thigh.- MD placed patient on topical hydrocortisone.  Elevated bilateral legs on pillows.

## 2025-03-17 NOTE — PLAN OF CARE
Pt is A/O x4 VSS. Pt complained of pain and had Dilaudid 2 mg oral.     Fely Quick, RN      Problem: Adult Inpatient Plan of Care  Goal: Plan of Care Review  Description: The Plan of Care Review/Shift note should be completed every shift.  The Outcome Evaluation is a brief statement about your assessment that the patient is improving, declining, or no change.  This information will be displayed automatically on your shift  note.  Outcome: Progressing   Goal Outcome Evaluation:

## 2025-03-17 NOTE — PLAN OF CARE
Goal Outcome Evaluation:       Patient's pain is managed with ordered pain medication.  Up to chair most of the shift.  Tolerating a regular diet.  Assist x 2 with walker and belt.  Call light within reach.  Continue IV antibiotics.

## 2025-03-18 ENCOUNTER — APPOINTMENT (OUTPATIENT)
Dept: ULTRASOUND IMAGING | Facility: HOSPITAL | Age: 80
End: 2025-03-18
Attending: INTERNAL MEDICINE
Payer: COMMERCIAL

## 2025-03-18 ENCOUNTER — APPOINTMENT (OUTPATIENT)
Dept: OCCUPATIONAL THERAPY | Facility: HOSPITAL | Age: 80
End: 2025-03-18
Payer: COMMERCIAL

## 2025-03-18 LAB
ABO + RH BLD: NORMAL
ACANTHOCYTES BLD QL SMEAR: SLIGHT
ALBUMIN SERPL BCG-MCNC: 2.2 G/DL (ref 3.5–5.2)
ALP SERPL-CCNC: 635 U/L (ref 40–150)
ALT SERPL W P-5'-P-CCNC: 42 U/L (ref 0–50)
AST SERPL W P-5'-P-CCNC: 35 U/L (ref 0–45)
BASOPHILS # BLD AUTO: 0.1 10E3/UL (ref 0–0.2)
BASOPHILS # BLD MANUAL: 0 10E3/UL (ref 0–0.2)
BASOPHILS NFR BLD AUTO: 1 %
BASOPHILS NFR BLD MANUAL: 0 %
BILIRUB DIRECT SERPL-MCNC: 0.11 MG/DL (ref 0–0.3)
BILIRUB SERPL-MCNC: 0.2 MG/DL
BLD GP AB SCN SERPL QL: NEGATIVE
BLD PROD TYP BPU: NORMAL
BLOOD COMPONENT TYPE: NORMAL
CODING SYSTEM: NORMAL
CREAT SERPL-MCNC: 1.01 MG/DL (ref 0.51–0.95)
CROSSMATCH: NORMAL
CRP SERPL-MCNC: 191.1 MG/L
DACRYOCYTES BLD QL SMEAR: SLIGHT
EGFRCR SERPLBLD CKD-EPI 2021: 57 ML/MIN/1.73M2
EOSINOPHIL # BLD AUTO: 0.2 10E3/UL (ref 0–0.7)
EOSINOPHIL # BLD MANUAL: 0.3 10E3/UL (ref 0–0.7)
EOSINOPHIL NFR BLD AUTO: 2 %
EOSINOPHIL NFR BLD MANUAL: 2 %
ERYTHROCYTE [DISTWIDTH] IN BLOOD BY AUTOMATED COUNT: 16.5 % (ref 10–15)
ERYTHROCYTE [DISTWIDTH] IN BLOOD BY AUTOMATED COUNT: 16.6 % (ref 10–15)
FOLATE SERPL-MCNC: 14.8 NG/ML (ref 4.6–34.8)
HCT VFR BLD AUTO: 20.6 % (ref 35–47)
HCT VFR BLD AUTO: 21.3 % (ref 35–47)
HGB BLD-MCNC: 6.2 G/DL (ref 11.7–15.7)
HGB BLD-MCNC: 6.3 G/DL (ref 11.7–15.7)
HGB BLD-MCNC: 7.5 G/DL (ref 11.7–15.7)
HOLD SPECIMEN: NORMAL
IMM GRANULOCYTES # BLD: 0.7 10E3/UL
IMM GRANULOCYTES NFR BLD: 5 %
IRON BINDING CAPACITY (ROCHE): 176 UG/DL (ref 240–430)
IRON SATN MFR SERPL: 6 % (ref 15–46)
IRON SERPL-MCNC: 11 UG/DL (ref 37–145)
ISSUE DATE AND TIME: NORMAL
LDH SERPL L TO P-CCNC: 133 U/L (ref 0–250)
LYMPHOCYTES # BLD AUTO: 1.5 10E3/UL (ref 0.8–5.3)
LYMPHOCYTES # BLD MANUAL: 1.8 10E3/UL (ref 0.8–5.3)
LYMPHOCYTES NFR BLD AUTO: 11 %
LYMPHOCYTES NFR BLD MANUAL: 13 %
MCH RBC QN AUTO: 23.2 PG (ref 26.5–33)
MCH RBC QN AUTO: 23.7 PG (ref 26.5–33)
MCHC RBC AUTO-ENTMCNC: 29.6 G/DL (ref 31.5–36.5)
MCHC RBC AUTO-ENTMCNC: 30.1 G/DL (ref 31.5–36.5)
MCV RBC AUTO: 79 FL (ref 78–100)
MCV RBC AUTO: 79 FL (ref 78–100)
METAMYELOCYTES # BLD MANUAL: 0.1 10E3/UL
METAMYELOCYTES NFR BLD MANUAL: 1 %
MONOCYTES # BLD AUTO: 1.1 10E3/UL (ref 0–1.3)
MONOCYTES # BLD MANUAL: 0.9 10E3/UL (ref 0–1.3)
MONOCYTES NFR BLD AUTO: 8 %
MONOCYTES NFR BLD MANUAL: 6 %
MYELOCYTES # BLD MANUAL: 0.1 10E3/UL
MYELOCYTES NFR BLD MANUAL: 1 %
NEUTROPHILS # BLD AUTO: 10.3 10E3/UL (ref 1.6–8.3)
NEUTROPHILS # BLD MANUAL: 10.9 10E3/UL (ref 1.6–8.3)
NEUTROPHILS NFR BLD AUTO: 74 %
NEUTROPHILS NFR BLD MANUAL: 77 %
NRBC # BLD AUTO: 0 10E3/UL
NRBC BLD AUTO-RTO: 0 /100
PATH REPORT.COMMENTS IMP SPEC: NORMAL
PATH REPORT.COMMENTS IMP SPEC: NORMAL
PATH REPORT.FINAL DX SPEC: NORMAL
PATH REPORT.MICROSCOPIC SPEC OTHER STN: NORMAL
PLAT MORPH BLD: ABNORMAL
PLATELET # BLD AUTO: 444 10E3/UL (ref 150–450)
PLATELET # BLD AUTO: 450 10E3/UL (ref 150–450)
POLYCHROMASIA BLD QL SMEAR: SLIGHT
PROT SERPL-MCNC: 5.9 G/DL (ref 6.4–8.3)
RBC # BLD AUTO: 2.62 10E6/UL (ref 3.8–5.2)
RBC # BLD AUTO: 2.71 10E6/UL (ref 3.8–5.2)
RBC MORPH BLD: ABNORMAL
RETICS # AUTO: 0.03 10E6/UL (ref 0.03–0.1)
RETICS # AUTO: 0.04 10E6/UL (ref 0.03–0.1)
RETICS/RBC NFR AUTO: 1.1 % (ref 0.5–2)
RETICS/RBC NFR AUTO: 1.3 % (ref 0.5–2)
SPECIMEN EXP DATE BLD: NORMAL
TARGETS BLD QL SMEAR: SLIGHT
UNIT ABO/RH: NORMAL
UNIT NUMBER: NORMAL
UNIT STATUS: NORMAL
UNIT TYPE ISBT: 6200
WBC # BLD AUTO: 14 10E3/UL (ref 4–11)
WBC # BLD AUTO: 14.2 10E3/UL (ref 4–11)

## 2025-03-18 PROCEDURE — 36415 COLL VENOUS BLD VENIPUNCTURE: CPT | Performed by: INTERNAL MEDICINE

## 2025-03-18 PROCEDURE — 82728 ASSAY OF FERRITIN: CPT | Performed by: INTERNAL MEDICINE

## 2025-03-18 PROCEDURE — 93922 UPR/L XTREMITY ART 2 LEVELS: CPT

## 2025-03-18 PROCEDURE — 84155 ASSAY OF PROTEIN SERUM: CPT | Performed by: INTERNAL MEDICINE

## 2025-03-18 PROCEDURE — G0463 HOSPITAL OUTPT CLINIC VISIT: HCPCS

## 2025-03-18 PROCEDURE — 86850 RBC ANTIBODY SCREEN: CPT | Performed by: INTERNAL MEDICINE

## 2025-03-18 PROCEDURE — 83010 ASSAY OF HAPTOGLOBIN QUANT: CPT | Performed by: INTERNAL MEDICINE

## 2025-03-18 PROCEDURE — 99232 SBSQ HOSP IP/OBS MODERATE 35: CPT | Performed by: INTERNAL MEDICINE

## 2025-03-18 PROCEDURE — 84075 ASSAY ALKALINE PHOSPHATASE: CPT | Performed by: INTERNAL MEDICINE

## 2025-03-18 PROCEDURE — 93925 LOWER EXTREMITY STUDY: CPT

## 2025-03-18 PROCEDURE — 120N000001 HC R&B MED SURG/OB

## 2025-03-18 PROCEDURE — 85045 AUTOMATED RETICULOCYTE COUNT: CPT | Performed by: INTERNAL MEDICINE

## 2025-03-18 PROCEDURE — 85014 HEMATOCRIT: CPT | Performed by: INTERNAL MEDICINE

## 2025-03-18 PROCEDURE — 250N000011 HC RX IP 250 OP 636: Performed by: INTERNAL MEDICINE

## 2025-03-18 PROCEDURE — 83540 ASSAY OF IRON: CPT | Performed by: INTERNAL MEDICINE

## 2025-03-18 PROCEDURE — 86140 C-REACTIVE PROTEIN: CPT | Performed by: INTERNAL MEDICINE

## 2025-03-18 PROCEDURE — 86923 COMPATIBILITY TEST ELECTRIC: CPT | Performed by: INTERNAL MEDICINE

## 2025-03-18 PROCEDURE — 250N000011 HC RX IP 250 OP 636

## 2025-03-18 PROCEDURE — P9016 RBC LEUKOCYTES REDUCED: HCPCS | Performed by: INTERNAL MEDICINE

## 2025-03-18 PROCEDURE — 85025 COMPLETE CBC W/AUTO DIFF WBC: CPT | Performed by: INTERNAL MEDICINE

## 2025-03-18 PROCEDURE — 85018 HEMOGLOBIN: CPT | Performed by: INTERNAL MEDICINE

## 2025-03-18 PROCEDURE — 99233 SBSQ HOSP IP/OBS HIGH 50: CPT | Performed by: INTERNAL MEDICINE

## 2025-03-18 PROCEDURE — 97535 SELF CARE MNGMENT TRAINING: CPT | Mod: GO

## 2025-03-18 PROCEDURE — 86900 BLOOD TYPING SEROLOGIC ABO: CPT | Performed by: INTERNAL MEDICINE

## 2025-03-18 PROCEDURE — 250N000013 HC RX MED GY IP 250 OP 250 PS 637: Performed by: INTERNAL MEDICINE

## 2025-03-18 PROCEDURE — 85007 BL SMEAR W/DIFF WBC COUNT: CPT | Performed by: INTERNAL MEDICINE

## 2025-03-18 PROCEDURE — 83550 IRON BINDING TEST: CPT | Performed by: INTERNAL MEDICINE

## 2025-03-18 PROCEDURE — 258N000003 HC RX IP 258 OP 636: Performed by: INTERNAL MEDICINE

## 2025-03-18 PROCEDURE — 82746 ASSAY OF FOLIC ACID SERUM: CPT | Performed by: INTERNAL MEDICINE

## 2025-03-18 PROCEDURE — 250N000013 HC RX MED GY IP 250 OP 250 PS 637

## 2025-03-18 PROCEDURE — 82607 VITAMIN B-12: CPT | Performed by: INTERNAL MEDICINE

## 2025-03-18 PROCEDURE — 82565 ASSAY OF CREATININE: CPT | Performed by: INTERNAL MEDICINE

## 2025-03-18 PROCEDURE — 250N000011 HC RX IP 250 OP 636: Mod: JZ | Performed by: INTERNAL MEDICINE

## 2025-03-18 PROCEDURE — 83615 LACTATE (LD) (LDH) ENZYME: CPT | Performed by: INTERNAL MEDICINE

## 2025-03-18 PROCEDURE — 85060 BLOOD SMEAR INTERPRETATION: CPT | Performed by: PATHOLOGY

## 2025-03-18 RX ORDER — HYDROMORPHONE HYDROCHLORIDE 1 MG/ML
0.5 INJECTION, SOLUTION INTRAMUSCULAR; INTRAVENOUS; SUBCUTANEOUS EVERY 6 HOURS PRN
Status: DISCONTINUED | OUTPATIENT
Start: 2025-03-18 | End: 2025-03-20

## 2025-03-18 RX ADMIN — CITALOPRAM HYDROBROMIDE 40 MG: 40 TABLET, FILM COATED ORAL at 14:12

## 2025-03-18 RX ADMIN — HYDROMORPHONE HYDROCHLORIDE 2 MG: 2 TABLET ORAL at 00:35

## 2025-03-18 RX ADMIN — HYDROMORPHONE HYDROCHLORIDE 2 MG: 2 TABLET ORAL at 14:11

## 2025-03-18 RX ADMIN — HYDROMORPHONE HYDROCHLORIDE 0.5 MG: 1 INJECTION, SOLUTION INTRAMUSCULAR; INTRAVENOUS; SUBCUTANEOUS at 22:12

## 2025-03-18 RX ADMIN — FUROSEMIDE 20 MG: 20 TABLET ORAL at 15:47

## 2025-03-18 RX ADMIN — HYDROCORTISONE: 1.14 LOTION TOPICAL at 21:15

## 2025-03-18 RX ADMIN — MICONAZOLE NITRATE ANTIFUNGAL POWDER: 2 POWDER TOPICAL at 21:16

## 2025-03-18 RX ADMIN — HYDROMORPHONE HYDROCHLORIDE 2 MG: 2 TABLET ORAL at 21:13

## 2025-03-18 RX ADMIN — GABAPENTIN 100 MG: 100 CAPSULE ORAL at 21:13

## 2025-03-18 RX ADMIN — HYDROMORPHONE HYDROCHLORIDE 2 MG: 2 TABLET ORAL at 05:34

## 2025-03-18 RX ADMIN — GABAPENTIN 100 MG: 100 CAPSULE ORAL at 14:12

## 2025-03-18 RX ADMIN — BUPROPION HYDROCHLORIDE 150 MG: 150 TABLET, FILM COATED, EXTENDED RELEASE ORAL at 09:08

## 2025-03-18 RX ADMIN — SIMVASTATIN 40 MG: 40 TABLET, FILM COATED ORAL at 21:13

## 2025-03-18 RX ADMIN — HYDROMORPHONE HYDROCHLORIDE 0.5 MG: 1 INJECTION, SOLUTION INTRAMUSCULAR; INTRAVENOUS; SUBCUTANEOUS at 16:02

## 2025-03-18 RX ADMIN — HYDROCORTISONE: 1.14 LOTION TOPICAL at 09:08

## 2025-03-18 RX ADMIN — SODIUM CHLORIDE 750 MG: 0.9 INJECTION, SOLUTION INTRAVENOUS at 21:15

## 2025-03-18 RX ADMIN — SPIRONOLACTONE 25 MG: 25 TABLET, FILM COATED ORAL at 09:08

## 2025-03-18 RX ADMIN — ROPINIROLE HYDROCHLORIDE 0.5 MG: 0.25 TABLET, FILM COATED ORAL at 16:31

## 2025-03-18 RX ADMIN — ROPINIROLE HYDROCHLORIDE 0.5 MG: 0.25 TABLET, FILM COATED ORAL at 09:08

## 2025-03-18 RX ADMIN — BENZONATATE 100 MG: 100 CAPSULE ORAL at 15:47

## 2025-03-18 RX ADMIN — CEFTRIAXONE SODIUM 2 G: 2 INJECTION, POWDER, FOR SOLUTION INTRAMUSCULAR; INTRAVENOUS at 16:02

## 2025-03-18 RX ADMIN — ROPINIROLE HYDROCHLORIDE 1 MG: 1 TABLET, FILM COATED ORAL at 21:14

## 2025-03-18 RX ADMIN — FUROSEMIDE 20 MG: 20 TABLET ORAL at 09:08

## 2025-03-18 RX ADMIN — GABAPENTIN 100 MG: 100 CAPSULE ORAL at 09:08

## 2025-03-18 RX ADMIN — HYDROMORPHONE HYDROCHLORIDE 0.5 MG: 1 INJECTION, SOLUTION INTRAMUSCULAR; INTRAVENOUS; SUBCUTANEOUS at 08:47

## 2025-03-18 RX ADMIN — HEPARIN SODIUM 5000 UNITS: 5000 INJECTION, SOLUTION INTRAVENOUS; SUBCUTANEOUS at 05:34

## 2025-03-18 RX ADMIN — ANORECTAL OINTMENT: 15.7; .44; 24; 20.6 OINTMENT TOPICAL at 21:16

## 2025-03-18 RX ADMIN — PANTOPRAZOLE SODIUM 40 MG: 40 TABLET, DELAYED RELEASE ORAL at 09:08

## 2025-03-18 RX ADMIN — SODIUM CHLORIDE 750 MG: 0.9 INJECTION, SOLUTION INTRAVENOUS at 09:14

## 2025-03-18 ASSESSMENT — ACTIVITIES OF DAILY LIVING (ADL)
ADLS_ACUITY_SCORE: 53
ADLS_ACUITY_SCORE: 51
ADLS_ACUITY_SCORE: 53
ADLS_ACUITY_SCORE: 53
ADLS_ACUITY_SCORE: 51
ADLS_ACUITY_SCORE: 53
ADLS_ACUITY_SCORE: 51
ADLS_ACUITY_SCORE: 53
ADLS_ACUITY_SCORE: 51
ADLS_ACUITY_SCORE: 51
ADLS_ACUITY_SCORE: 53

## 2025-03-18 NOTE — PLAN OF CARE
Goal Outcome Evaluation:      Plan of Care Reviewed With: patient    Overall Patient Progress: improvingOverall Patient Progress: improving     Dilaudid given x2 this shift for right leg pain. Patient reporting periods of anxiety overnight.

## 2025-03-18 NOTE — PROGRESS NOTES
[facility]    Medicine Progress Note - Hospitalist Service    Date of Admission:  3/12/2025    Assessment & Plan   Elizabeth Kelley is a 79 year old female with history of recurrent right lower extremity cellulitis, chronic lower extremity lymphedema, and chronic venous stasis,  who is admitted for LEFT lower extremity cellulitis.  Hospital course complicated by acute on chronic anemia.      Left Lower Extremity Cellulitis  Presents with LEFT lower extremity erythema, edema.  Admitted 1/12 - 1/18/2025 for RIGHT lower extremity cellulitis and sepsis.  She was treated with meropenem and vancomycin, then transitioned to doxycycline.  Has recently started suppression with minocycline 50 mg daily   Had elevated procalcitonin, CRP and leukocytosis.  ->206   Ultrasound negative for DVT.  CT scan of the left thigh shows no abscess and no osteomyelitis.   -- Initially on Vanco/Zosyn  -- Zosyn discontinued and ceftriaxone initiated 3/17 given rising WBC  -- ID consulted and input appreciated  -- reconsult WOC RN   -- ABIs wnl  -- Follow-up blood cultures  -- Pain control with Tylenol, Dilaudid as needed    Acute on chronic anemia.  Some oozing from the RLE wounds but otherwise no obvious active bleeding.  Hb 6.2 from 7.9 on admission.  Has h/o upper GIB due to PUD, recent colonoscopy was unremarkable   --check iron, B12, folate, hemolysis labs  --transfuse 1 unit PRBCs    KIKE - improving, creatinine 1.13 today  Cr 1.62, baseline appears around 1  -- Monitor     Chronic Bilateral Lower Extremity Lymphedema, Chronic Venous Stasis  -- Continue spironolactone/Lasix   -- PT for lymphedema wraps   -- WOC consulted    Generalized weakness  -- PT/OT are recommending TCU, patient has been declining so far    Chronic stable conditions:   Restless leg syndrome - continue PTA ropinirole  Anxiety depression - continue PTA Wellbutrin and Celexa  Hyperlipidemia - continue PTA statin   GERD - continue PTA  "PPI  Obesity: BMI 48.          Diet: Combination Diet Regular Diet Adult    DVT Prophylaxis: Heparin SQ  Amato Catheter: Not present  Lines: None     Cardiac Monitoring: None  Code Status: Full Code      Clinically Significant Risk Factors               # Hypoalbuminemia: Lowest albumin = 2.2 g/dL at 3/18/2025  6:58 AM, will monitor as appropriate     # Hypertension: Noted on problem list            # Severe Obesity: Estimated body mass index is 49.94 kg/m  as calculated from the following:    Height as of this encounter: 1.524 m (5').    Weight as of this encounter: 116 kg (255 lb 11.7 oz).      # Financial/Environmental Concerns: none;other (see comments) (Significant other states, \"without me she would be living on the streets and be unable to afford food, but since I still work full time we do just fine. Her  left her and kicked her out of the house about 15 years ago\".)         Social Drivers of Health    Tobacco Use: Medium Risk (10/21/2024)    Patient History     Smoking Tobacco Use: Former     Smokeless Tobacco Use: Never          Disposition Plan     Medically Ready for Discharge: Anticipated in 2-4 Days             Christie Maldonado MD  Hospitalist Service  [facility]  Securely message with [employer] (more info)  Text page via [employer] Paging/Directory   ______________________________________________________________________    Interval History   Reports increased left lower extremity swelling but lower leg looks better from a few days ago.  Patient also reports that her right lower leg wounds look better than prior to admission   No dizziness, chest pain or shortness of breath       Physical Exam   Vital Signs: Temp: 98.3  F (36.8  C) Temp src: Oral BP: 108/53 Pulse: 83   Resp: 18 SpO2: 93 % O2 Device: Nasal cannula Oxygen Delivery: 1 LPM  Weight: 255 lbs 11.74 oz    General Appearance: NAD   Respiratory: Easy respirations, lungs clear anteriorly   Cardiovascular: RRR  GI: " Abdomen soft and nontender   Extremities: Bilateral lower extremity edema L>R, left lower extremity: Lower leg erythema and edema is improved from prior, some blistering along the lateral left hip and upper leg, R lower leg with multiple wounds  Other: Alert, nonfocal      Medical Decision Making       50 MINUTES SPENT BY ME on the date of service doing chart review, history, exam, documentation & further activities per the note.  MANAGEMENT DISCUSSED with the following over the past 24 hours: patient, RN       Data     I have personally reviewed the following data over the past 24 hrs:    14.0 (H)  \   6.3 (LL)   / 444     N/A N/A N/A /  N/A   N/A N/A 1.01 (H) \     ALT: 42 AST: 35 AP: 635 (H) TBILI: 0.2   ALB: 2.2 (L) TOT PROTEIN: 5.9 (L) LIPASE: N/A     Procal: N/A CRP: 191.10 (H) Lactic Acid: N/A       Ferritin:  N/A % Retic:  1.3 LDH:  133       Imaging results reviewed over the past 24 hrs:   No results found for this or any previous visit (from the past 24 hours).

## 2025-03-18 NOTE — PLAN OF CARE
Goal Outcome Evaluation:       Patient's pain is managed with  ordered pain medication.  Dressing changed this am. WOC dressing right leg now.  Tolerating a regular diet.  Bilateral US done, awaiting results.  Pure wick in place.  Bed alarm on, call light within reach.

## 2025-03-18 NOTE — CONSULTS
[facility]  WO Nurse Inpatient Assessment     Consulted for: right and lower extremities    Summary: 3/18: patient was evaluated in the am, wound care materials were ordered, woc team went back in the afternoon to complete cares when products arrived.     3/13 Patient had left leg wrapped by Lymphedema team prior to [facility] arrival.  Patient known to WOC team, notes from previous admissions available in Epic.  Sees home care x3 a week and does wound care independently on other days.  Right leg had signs of pseudomonas including green drainage and sweet, yeast like odor.  Vashe wound cleanser should get rid of pseudomonas.    WO nurse follow-up plan: weekly    Patient History (according to provider note(s):       Elizabeth Kelley is a 79 year old female with PMHx of recurrent right lower extremity cellulitis, chronic lower extremity lymphedema, chronic venous stasis, chronic anemia, RLS, HLD, GERD who was admitted on 3/12/2025. She has a history of recurrent hospitalizations for right lower extremity cellulitis. Patient recently admitted 1/12 - 1/18/2025 for RIGHT lower extremity cellulitis and sepsis.  Had been treated with meropenem and vancomycin, ID was consulted and she was discharged on doxycycline.  She presented to the ER today for evaluation of LEFT lower extremity erythema, edema, pain and admitted for left lower extremity cellulitis.     Assessment:      Areas visualized during today's visit: Lower extremities     Skin Injury Location: right leg            Green drainage                                               front                                                                 lateral                                                               medial          3/18    Last photo: 3/18/2025  Skin injury due to: Unclear etiology and cellulitis  Skin history and plan of care:   see chart  Affected area:      Skin assessment: Denudement, Edema, and Maceration     Measurements  "(length x width x depth, in cm) circumference of leg from knee to ankle     Color: normal and consistent with surrounding tissue     Temperature  normal      Drainage: copious .      Color: creamy      Odor: strong and sweet  Pain: moderate, sharp  Pain interventions prior to dressing change: slow and gentle cares   Treatment goal: Drainage control, Decrease moisture, and Protection  STATUS: evolving  Supplies ordered: ordered polymem from [facility]     Patient was seen this am with both nurse and MD at bedside to reeval. Oil emulsion dressings were used instead of the xeroform that was ordered and patient had stated that they were scrubbed off this morning. Patient was placed in a soaking vashe wrap and supplies were ordered from [facility], woc nurse returned and placed new dressing.    Treatment Plan:     Right leg wound(s): Every other day can do wound cares on Lymphedema schedule if wrapping leg  Moisten 4\" roll of Kerlix with Vashe, wring out excess.  Wrap leg from knee to ankle and let soak for 15-20 minutes  Cover wound with Polymem roll (#[id number]) - WRITING AWAY FROM WOUND, secure with kerlix roll  Cover Calf with 2 large Mextra under knee with medium size to fill in gap and two medium around ankle.  Secure with 2 rolls of dry 4\" Kerlix from toes to knees, place pillow under calf and protect pillow with Chux     Call stores for Mextra if out Large PS# [id number], medium PS# [id number]      Orders: Written    RECOMMEND PRIMARY TEAM ORDER: None, at this time  Education provided: plan of care  Discussed plan of care with: Patient and Nurse  Notify WOC if wound(s) deteriorate.  Nursing to notify the Provider(s) and re-consult the WOC Nurse if new skin concern.    DATA:     Current support surface: Standard  Standard gel mattress (Isoflex)  Containment of urine/stool: Continent of bladder and Continent of bowel  BMI: Body mass index is 49.94 kg/m .   Active diet order: Orders Placed This Encounter      Combination Diet Regular Diet " Adult     Output: I/O last 3 completed shifts:  In: 1230 [P.O.:1200; I.V.:30]  Out: 1300 [Urine:1300]     Labs:   Recent Labs   Lab 03/18/25  0812 03/18/25  0658 03/13/25  0814 03/12/25  1600   ALBUMIN  --  2.2*   < > 2.6*   HGB 6.3* 6.2*   < > 7.9*   INR  --   --   --  1.11   WBC 14.0* 14.2*   < > 19.3*    < > = values in this interval not displayed.     Pressure injury risk assessment:   Sensory Perception: 4-->no impairment  Moisture: 3-->occasionally moist  Activity: 3-->walks occasionally  Mobility: 3-->slightly limited  Nutrition: 3-->adequate  Friction and Shear: 2-->potential problem  Ajith Score: 18      Pager no longer in use, please contact through [employer] group: [facility]

## 2025-03-19 ENCOUNTER — APPOINTMENT (OUTPATIENT)
Dept: PHYSICAL THERAPY | Facility: HOSPITAL | Age: 80
End: 2025-03-19
Payer: COMMERCIAL

## 2025-03-19 ENCOUNTER — APPOINTMENT (OUTPATIENT)
Dept: OCCUPATIONAL THERAPY | Facility: HOSPITAL | Age: 80
End: 2025-03-19
Payer: COMMERCIAL

## 2025-03-19 LAB
CREAT SERPL-MCNC: 1.03 MG/DL (ref 0.51–0.95)
EGFRCR SERPLBLD CKD-EPI 2021: 56 ML/MIN/1.73M2
ERYTHROCYTE [DISTWIDTH] IN BLOOD BY AUTOMATED COUNT: 17 % (ref 10–15)
FERRITIN SERPL-MCNC: 198 NG/ML (ref 11–328)
HAPTOGLOB SERPL-MCNC: 425 MG/DL (ref 30–200)
HCT VFR BLD AUTO: 23.4 % (ref 35–47)
HGB BLD-MCNC: 7.4 G/DL (ref 11.7–15.7)
MCH RBC QN AUTO: 25.3 PG (ref 26.5–33)
MCHC RBC AUTO-ENTMCNC: 31.6 G/DL (ref 31.5–36.5)
MCV RBC AUTO: 80 FL (ref 78–100)
PLATELET # BLD AUTO: 499 10E3/UL (ref 150–450)
RBC # BLD AUTO: 2.92 10E6/UL (ref 3.8–5.2)
VIT B12 SERPL-MCNC: 971 PG/ML (ref 232–1245)
WBC # BLD AUTO: 14.1 10E3/UL (ref 4–11)

## 2025-03-19 PROCEDURE — 250N000011 HC RX IP 250 OP 636: Performed by: INTERNAL MEDICINE

## 2025-03-19 PROCEDURE — 82565 ASSAY OF CREATININE: CPT | Performed by: INTERNAL MEDICINE

## 2025-03-19 PROCEDURE — 250N000013 HC RX MED GY IP 250 OP 250 PS 637: Performed by: INTERNAL MEDICINE

## 2025-03-19 PROCEDURE — 250N000013 HC RX MED GY IP 250 OP 250 PS 637: Performed by: NURSE PRACTITIONER

## 2025-03-19 PROCEDURE — 97535 SELF CARE MNGMENT TRAINING: CPT | Mod: GO

## 2025-03-19 PROCEDURE — 258N000003 HC RX IP 258 OP 636: Performed by: INTERNAL MEDICINE

## 2025-03-19 PROCEDURE — 99232 SBSQ HOSP IP/OBS MODERATE 35: CPT | Performed by: INTERNAL MEDICINE

## 2025-03-19 PROCEDURE — 250N000013 HC RX MED GY IP 250 OP 250 PS 637: Performed by: PHYSICIAN ASSISTANT

## 2025-03-19 PROCEDURE — 97110 THERAPEUTIC EXERCISES: CPT | Mod: GP

## 2025-03-19 PROCEDURE — 120N000001 HC R&B MED SURG/OB

## 2025-03-19 PROCEDURE — 97530 THERAPEUTIC ACTIVITIES: CPT | Mod: GP

## 2025-03-19 PROCEDURE — 99222 1ST HOSP IP/OBS MODERATE 55: CPT | Performed by: NURSE PRACTITIONER

## 2025-03-19 PROCEDURE — 36415 COLL VENOUS BLD VENIPUNCTURE: CPT | Performed by: INTERNAL MEDICINE

## 2025-03-19 PROCEDURE — 250N000013 HC RX MED GY IP 250 OP 250 PS 637

## 2025-03-19 PROCEDURE — 85014 HEMATOCRIT: CPT | Performed by: INTERNAL MEDICINE

## 2025-03-19 RX ORDER — PANTOPRAZOLE SODIUM 40 MG/1
40 TABLET, DELAYED RELEASE ORAL
Status: DISCONTINUED | OUTPATIENT
Start: 2025-03-19 | End: 2025-03-25 | Stop reason: HOSPADM

## 2025-03-19 RX ORDER — ACETAMINOPHEN 325 MG/1
650 TABLET ORAL EVERY 4 HOURS
Status: DISCONTINUED | OUTPATIENT
Start: 2025-03-19 | End: 2025-03-25 | Stop reason: HOSPADM

## 2025-03-19 RX ORDER — GABAPENTIN 100 MG/1
200 CAPSULE ORAL 2 TIMES DAILY
Status: DISCONTINUED | OUTPATIENT
Start: 2025-03-19 | End: 2025-03-25 | Stop reason: HOSPADM

## 2025-03-19 RX ORDER — FERROUS SULFATE 325(65) MG
325 TABLET ORAL EVERY OTHER DAY
Status: DISCONTINUED | OUTPATIENT
Start: 2025-03-20 | End: 2025-03-21

## 2025-03-19 RX ADMIN — GABAPENTIN 100 MG: 100 CAPSULE ORAL at 08:10

## 2025-03-19 RX ADMIN — SODIUM CHLORIDE 750 MG: 0.9 INJECTION, SOLUTION INTRAVENOUS at 20:29

## 2025-03-19 RX ADMIN — ANORECTAL OINTMENT: 15.7; .44; 24; 20.6 OINTMENT TOPICAL at 08:11

## 2025-03-19 RX ADMIN — SODIUM CHLORIDE 750 MG: 0.9 INJECTION, SOLUTION INTRAVENOUS at 08:10

## 2025-03-19 RX ADMIN — CITALOPRAM HYDROBROMIDE 40 MG: 40 TABLET, FILM COATED ORAL at 13:14

## 2025-03-19 RX ADMIN — ACETAMINOPHEN 650 MG: 325 TABLET ORAL at 20:32

## 2025-03-19 RX ADMIN — BUPROPION HYDROCHLORIDE 150 MG: 150 TABLET, FILM COATED, EXTENDED RELEASE ORAL at 08:10

## 2025-03-19 RX ADMIN — HYDROCORTISONE: 1.14 LOTION TOPICAL at 08:11

## 2025-03-19 RX ADMIN — ACETAMINOPHEN 650 MG: 325 TABLET ORAL at 13:14

## 2025-03-19 RX ADMIN — HYDROMORPHONE HYDROCHLORIDE 2 MG: 2 TABLET ORAL at 13:14

## 2025-03-19 RX ADMIN — SIMVASTATIN 40 MG: 40 TABLET, FILM COATED ORAL at 22:02

## 2025-03-19 RX ADMIN — ANORECTAL OINTMENT: 15.7; .44; 24; 20.6 OINTMENT TOPICAL at 20:33

## 2025-03-19 RX ADMIN — PANTOPRAZOLE SODIUM 40 MG: 40 TABLET, DELAYED RELEASE ORAL at 17:06

## 2025-03-19 RX ADMIN — ROPINIROLE HYDROCHLORIDE 0.5 MG: 0.25 TABLET, FILM COATED ORAL at 17:05

## 2025-03-19 RX ADMIN — HYDROMORPHONE HYDROCHLORIDE 2 MG: 2 TABLET ORAL at 06:42

## 2025-03-19 RX ADMIN — CEFTRIAXONE SODIUM 2 G: 2 INJECTION, POWDER, FOR SOLUTION INTRAMUSCULAR; INTRAVENOUS at 17:04

## 2025-03-19 RX ADMIN — PANTOPRAZOLE SODIUM 40 MG: 40 TABLET, DELAYED RELEASE ORAL at 08:10

## 2025-03-19 RX ADMIN — FUROSEMIDE 20 MG: 20 TABLET ORAL at 08:10

## 2025-03-19 RX ADMIN — ROPINIROLE HYDROCHLORIDE 0.5 MG: 0.25 TABLET, FILM COATED ORAL at 08:10

## 2025-03-19 RX ADMIN — GABAPENTIN 200 MG: 100 CAPSULE ORAL at 20:32

## 2025-03-19 RX ADMIN — HYDROMORPHONE HYDROCHLORIDE 1 MG: 2 TABLET ORAL at 22:02

## 2025-03-19 RX ADMIN — FUROSEMIDE 20 MG: 20 TABLET ORAL at 17:06

## 2025-03-19 RX ADMIN — ROPINIROLE HYDROCHLORIDE 1 MG: 1 TABLET, FILM COATED ORAL at 22:01

## 2025-03-19 RX ADMIN — Medication 60 ML: at 13:14

## 2025-03-19 RX ADMIN — ACETAMINOPHEN 650 MG: 325 TABLET ORAL at 17:05

## 2025-03-19 RX ADMIN — SPIRONOLACTONE 25 MG: 25 TABLET, FILM COATED ORAL at 08:10

## 2025-03-19 ASSESSMENT — ACTIVITIES OF DAILY LIVING (ADL)
ADLS_ACUITY_SCORE: 53

## 2025-03-19 NOTE — PROGRESS NOTES
[facility]    Medicine Progress Note - Hospitalist Service    Date of Admission:  3/12/2025    Assessment & Plan   Elizabeth Kelley is a 79 year old female with history of recurrent right lower extremity cellulitis, chronic lower extremity lymphedema, and chronic venous stasis,  who is admitted for LEFT lower extremity cellulitis which is slow to improve.   Hospital course complicated by acute on chronic anemia.      Left Lower Extremity Cellulitis  Presents with LEFT lower extremity erythema, edema.  Admitted 1/12 - 1/18/2025 for RIGHT lower extremity cellulitis and sepsis.  She was treated with meropenem and vancomycin, then transitioned to doxycycline.  Has recently started suppression with minocycline 50 mg daily   Had elevated procalcitonin, CRP and leukocytosis.  ->206   Ultrasound negative for DVT.  CT scan of the left thigh shows no abscess and no osteomyelitis.   -- Initially on Vanco/Zosyn  -- Zosyn discontinued and ceftriaxone initiated 3/17 given rising WBC  -- ID consulted and input appreciated  -- reconsulted WOC RN 3/18 -recommendations appreciated  -- ABIs wnl  -- Follow-up blood cultures  -- Pain control with Tylenol, Dilaudid as needed  -- Pain management consult    Acute on chronic anemia.  Likely multifactorial. Some oozing from the RLE wounds but otherwise no obvious active bleeding.  Hb 6.2 from 7.9 on admission.  Has h/o upper GIB due to PUD, recent colonoscopy was unremarkable   --labs suggestive of DAYANARA, B12 and folate normal, no evidence of hemolysis  --transfused 1 unit PRBCs on 3/18  --Consulted GI, recommend increasing PPI to twice daily and outpatient EGD  -- Resume PTA iron    KIKE - improving, creatinine back to baseline   Cr 1.62, baseline appears around 1  -- Monitor     Chronic Bilateral Lower Extremity Lymphedema, Chronic Venous Stasis  -- Continue spironolactone/Lasix   -- PT for lymphedema wraps   -- WOC consulted    Generalized weakness  -- PT/OT  "are recommending TCU, patient has been declining so far    Chronic stable conditions:   Restless leg syndrome - continue PTA ropinirole  Anxiety depression - continue PTA Wellbutrin and Celexa  Hyperlipidemia - continue PTA statin   GERD - continue PTA PPI  Obesity: BMI 48.          Diet: Combination Diet Regular Diet Adult    DVT Prophylaxis: Heparin SQ  Amato Catheter: Not present  Lines: None     Cardiac Monitoring: None  Code Status: Full Code      Clinically Significant Risk Factors               # Hypoalbuminemia: Lowest albumin = 2.2 g/dL at 3/18/2025  6:58 AM, will monitor as appropriate     # Hypertension: Noted on problem list            # Severe Obesity: Estimated body mass index is 49.94 kg/m  as calculated from the following:    Height as of this encounter: 1.524 m (5').    Weight as of this encounter: 116 kg (255 lb 11.7 oz).      # Financial/Environmental Concerns: none;other (see comments) (Significant other states, \"without me she would be living on the streets and be unable to afford food, but since I still work full time we do just fine. Her  left her and kicked her out of the house about 15 years ago\".)         Social Drivers of Health    Tobacco Use: Medium Risk (10/21/2024)    Patient History     Smoking Tobacco Use: Former     Smokeless Tobacco Use: Never          Disposition Plan     Medically Ready for Discharge: Anticipated in 2-4 Days             Christie Maldonado MD  Hospitalist Service  [facility]  Securely message with [employer] (more info)  Text page via [employer] Paging/Directory   ______________________________________________________________________    Interval History   Feels somewhat better today.  Tolerates antibiotics well.  Continues to have bilateral leg pain.  No abdominal pain.  Brown stool.  Confirms she does not want TCU    Physical Exam   Vital Signs: Temp: 98.4  F (36.9  C) Temp src: Oral BP: 120/56 Pulse: 74   Resp: 18 SpO2: 97 % O2 Device: " Nasal cannula Oxygen Delivery: 3 LPM  Weight: 255 lbs 11.74 oz    General Appearance: Obese female in no acute distress  Respiratory: Respirations unlabored, lungs are clear  Cardiovascular: Regular.  Normal S1-S2  GI: Abdomen soft, nontender  Extremities: Bilateral lower extremity edema, right lower extremity with dressing, left lower extremity with lymphedema wrap  Other: Alert, nonfocal    Medical Decision Making       45 MINUTES SPENT BY ME on the date of service doing chart review, history, exam, documentation & further activities per the note.  MANAGEMENT DISCUSSED with the following over the past 24 hours: Patient and nursing       Data     I have personally reviewed the following data over the past 24 hrs:    14.1 (H)  \   7.4 (L)   / 499 (H)     N/A N/A N/A /  N/A   N/A N/A 1.03 (H) \     Ferritin:  N/A % Retic:  N/A LDH:  N/A       Imaging results reviewed over the past 24 hrs:   No results found for this or any previous visit (from the past 24 hours).

## 2025-03-19 NOTE — CONSULTS
[facility] ACUTE PAIN SERVICE CONSULTATION   [facility], [facility], [facility], [facility], [place]     Date of Admission:  3/12/2025  Date of Consult (When I saw the patient): 03/19/25     Assessment/Plan:     Elizabeth Kelley is a 79 year old female who was admitted on 3/12/2025.  Pain team was asked to see the patient for lower extremity wound pain. Admitted for fevers, redness, swelling in the left leg up to the left buttock. History of recurrent lower extremity cellulitis, chronic lymphedema, hyperlipidemia, GERD, acute on chronic anemia, chronic venous stasis, restless leg syndrome, anxiety, depression, obesity.  Describes pain as 7-8/10 and aching, sharp, throbbing, raw, searing, burning in the left lower extremity.     PLAN:   1) Pain is consistent with cellulitis of the left lower extremity in the setting of recurrent right lower extremity cellulitis, chronic bilateral lower extremity lymphedema, chronic venous stasis, KIKE.  Patient does have a history of recurrent hospitalizations for right lower extremity cellulitis.  Patient was recently admitted 1/12/2025 through 1/18/2025 for right lower extremity cellulitis and sepsis and she had been treated with meropenem and vancomycin for which ID was consulted and she was discharged on doxycycline.  She presented to the emergency department for left lower extremity erythema, edema, pain and was admitted for left lower leg cellulitis.    Multimodal Medication Therapy  Topical: None  NSAID'S: Estimated Creatinine Clearance: 53.2 mL/min (A) (based on SCr of 1.03 mg/dL (H)). None, KIKE  Steroids: None  Muscle Relaxants: None  Adjuvants: Schedule acetaminophen 650 mg every 4 hours, gabapentin 100 mg 3 times daily (home medication)-changed to 200 mg twice daily (CrCl greater than 30 to 59 mL/min, 400 to 1400 mg/day given in 2 divided doses)  Antidepressants/anxiolytics: Ropinirole 0.5 mg twice daily and 1 mg at bedtime, Celexa 40 mg daily with lunch, Wellbutrin 150 mg  every morning  Opioids: Dilaudid 1-2 mg every 3 hours as needed  IV Pain medication: Dilaudid 0.5 mg every 6 hours as needed  Non-medication interventions: Ice, Rest, PT, OT, Distraction (TV, Music, Reading), Meditation, and wound care  Constipation Prophylaxis: Senna docusate as needed, MiraLAX twice daily as needed    -Opioid prescriber has been none recent  -[place]  pulled from system on 3/19/2025. This indicates fills for gabapentin and 1 prescription for oxycodone 5 mg for 6 tablets filled in November 2024 otherwise no opioid use  12/23/2024 gabapentin 100 mg #270 for 90 days by Jensen Mcwilliams -same on 10/22/2024, 8/19/2024, 5/23/2024  11/15/2024 oxycodone 5 mg number 6 tablets for 2 days by Yung Herrmann  Discharge Recommendations - We recommend prescribing the following at the time of discharge: TBD     History of Present Illness (HPI):       Elizabeth Kelley is a 79 year old female who presented for left lower extremity erythema, edema, and pain.  Past medical history as above. The pain is reported to be acute, chronic, located in the LLE, and it does not radiate.  Current pain is rated at 7-8/10 and goal is 2-4/10.  The patient denies nausea, vomiting, constipation, diarrhea, chest pain, shortness of breath, dizziness, fever, and chills.     Per MN  review, the patient does not have an opioid tolerance. Opioid induced side effects noted and include: none     Reviewed medical record, labs, imaging, ED note, and care everywhere.     Past pain treatments have included: Gabapentin, oxycodone, Dilaudid, acetaminophen    Home pain medications/psych medications/anticoagulation medications include: Acetaminophen at 1000 mg daily as needed for pain, aspirin 81 mg daily, Wellbutrin 150 mg every morning, Celexa 40 mg daily with lunch, gabapentin 900 mg 3 times daily, magnesium oxide 400 mg every morning, ropinirole 0.5 mg twice daily and 1 mg at bedtime      Medical History   PAST MEDICAL HISTORY:   Past Medical  History:   Diagnosis Date    Ankle contracture, left     Class 3 severe obesity due to excess calories without serious comorbidity with body mass index (BMI) of 45.0 to 49.9 in adult (H)     Combined gastric and duodenal ulcer     Controlled substance agreement broken     Depression     Dyslipidemia     Edema     Edema     Gait abnormality     GERD (gastroesophageal reflux disease)     Gout     Lymphedema of both lower extremities     Melanoma (H)     left upper arm    MS (multiple sclerosis) (H)     RLS (restless legs syndrome)     Skin ulcer of left lower leg (H)     Valgus deformity of both feet     Vitamin D deficiency        PAST SURGICAL HISTORY:   Past Surgical History:   Procedure Laterality Date    ABLATION SAPHENOUS VEIN W/ RFA Right 04/12/2021    Saphenous vein, anterior accessory saphenous vein, sclerotherapy of multiple veins.    COSMETIC SURGERY  1988    reduction of excess skin from weight loss    ESOPHAGOSCOPY, GASTROSCOPY, DUODENOSCOPY (EGD), COMBINED N/A 03/30/2015    Procedure: UPPER ENDOSCOPY with gastric biopsy;  Surgeon: Checo Fletcher MD;  Location: [facility];  Service:     IRRIGATION AND DEBRIDEMENT LOWER EXTREMITY, COMBINED Right 3/21/2022    Procedure: RIGHT LEG WOUND DEBRIDEMENT, PAULIE DERMATONE, MISONIX, VAC VERA FLOW WITH VASHE;  Surgeon: Gabriele Bullock MD;  Location:  OR    IRRIGATION AND DEBRIDEMENT LOWER EXTREMITY, COMBINED Right 3/28/2022    Procedure: DEBRIDEMENT AND WOUND DRESSING CHANGE AND MYRIAD APPLICATION OF RIGHT LOWER LEG WOUND;  Surgeon: Gabriele Bullock MD;  Location:  OR    MAMMOPLASTY REDUCTION Bilateral     MOHS MICROGRAPHIC PROCEDURE      left upper arm, melanoma    PICC DOUBLE LUMEN PLACEMENT  7/21/2024    PICC SINGLE LUMEN PLACEMENT  8/13/2021         PICC SINGLE LUMEN PLACEMENT  9/2/2021         PICC SINGLE LUMEN PLACEMENT  9/8/2024    PICC SINGLE LUMEN PLACEMENT  1/14/2025    SPINE SURGERY      L5 area surgery, decompression       FAMILY HISTORY:    Family History   Problem Relation Age of Onset    Uterine Cancer Mother     Hyperlipidemia Mother     Hypertension Mother     Multiple Sclerosis Mother     Asthma Sister     Obesity Sister     Hyperlipidemia Sister     Hypertension Sister     Multiple Sclerosis Sister     Arthritis Sister     Colon Cancer Paternal Aunt     Breast Cancer Paternal Aunt     Hypertension Paternal Aunt     Hyperlipidemia Paternal Aunt     Brain Cancer Father     Arthritis Maternal Uncle     Arthritis Maternal Grandmother     Early Death Maternal Grandfather     Arthritis Paternal Grandmother     Arthritis Paternal Grandfather        SOCIAL HISTORY:   Social History     Tobacco Use    Smoking status: Former     Current packs/day: 0.00     Average packs/day: 0.3 packs/day for 12.0 years (3.0 ttl pk-yrs)     Types: Cigarettes     Start date: 1963     Quit date: 1975     Years since quittin.2    Smokeless tobacco: Never    Tobacco comments:     quit more than 30 years ago   Substance Use Topics    Alcohol use: Yes     Alcohol/week: 1.0 standard drink of alcohol     Types: 1 Standard drinks or equivalent per week     Comment: glass of wine once a week        HEALTH & LIFESTYLE PRACTICES  Tobacco:  reports that she quit smoking about 49 years ago. Her smoking use included cigarettes. She started smoking about 61 years ago. She has a 3 pack-year smoking history. She has never used smokeless tobacco.  Alcohol:  reports current alcohol use of about 1.0 standard drink of alcohol per week.  Illicit drugs:  reports no history of drug use.    Allergies  Allergies   Allergen Reactions    Other Environmental Allergy Anaphylaxis     Bees/Wasps Stings  Bees/Wasps Stings    Adhesive Tape Other (See Comments)     Red,itchy and oozes  Red,itchy and oozes    Adhesive [Cyanoacrylate] Unknown     Other reaction(s): sores    Latex Hives    Ragweeds Itching    Oxycodone-Acetaminophen Rash    Vicodin [Hydrocodone-Acetaminophen] Nausea and  Vomiting and Hives       Problem List  Patient Active Problem List    Diagnosis Date Noted    KIKE (acute kidney injury) 03/13/2025     Priority: Medium    Peripheral edema 03/12/2025     Priority: Medium    Cellulitis of left lower extremity 03/12/2025     Priority: Medium    Hypoxia 01/12/2025     Priority: Medium    Elevated troponin 01/12/2025     Priority: Medium    Chronic ulcer of right leg (H) 01/12/2025     Priority: Medium    Cellulitis of right lower leg 01/12/2025     Priority: Medium    Acute sepsis (H) 01/12/2025     Priority: Medium    Cellulitis 09/04/2024     Priority: Medium    Sepsis (H) 07/16/2024     Priority: Medium    Impaired glucose tolerance 07/21/2022     Priority: Medium    Iron deficiency anemia 07/21/2022     Priority: Medium    Snoring 07/21/2022     Priority: Medium    Pain associated with wound 03/21/2022     Priority: Medium    Chronic pain 03/10/2022     Priority: Medium    Ulcer of right lower extremity with fat layer exposed (H) 03/10/2022     Priority: Medium    History of malignant melanoma of skin 07/05/2021     Priority: Medium    Edema 07/05/2021     Priority: Medium    Major depression, single episode, in complete remission 07/05/2021     Priority: Medium    Menopausal and postmenopausal disorder 07/05/2021     Priority: Medium    Mixed hyperlipidemia 07/05/2021     Priority: Medium    Morbid obesity (H) 07/05/2021     Priority: Medium    Peripheral venous insufficiency 07/05/2021     Priority: Medium    Postmenopausal 07/05/2021     Priority: Medium    Restless legs 07/05/2021     Priority: Medium    Depression 04/28/2021     Priority: Medium    Vitamin D deficiency 04/28/2021     Priority: Medium    GERD (gastroesophageal reflux disease) 04/28/2021     Priority: Medium    Essential hypertension 04/28/2021     Priority: Medium    Valgus deformity of both feet 09/21/2016     Priority: Medium    Flat feet, bilateral 09/21/2016     Priority: Medium    Gait abnormality  09/21/2016     Priority: Medium    MS (multiple sclerosis) (H) 09/21/2016     Priority: Medium    Morbid obesity with BMI of 50.0-59.9, adult (H) 04/27/2016     Priority: Medium    Venous hypertension of lower extremity, bilateral 12/23/2015     Priority: Medium    Acquired lymphedema of leg 12/23/2015     Priority: Medium    Scar condition and fibrosis of skin 12/23/2015     Priority: Medium    Ankle contracture, left 12/23/2015     Priority: Medium       Prior to Admission Medications   Medications Prior to Admission   Medication Sig Dispense Refill Last Dose/Taking    acetaminophen (TYLENOL) 500 MG tablet Take 1,000 mg by mouth daily as needed for pain.   Taking As Needed    ascorbic acid (VITAMIN C) 250 MG CHEW chewable tablet Take 250 mg by mouth daily.   3/12/2025 Morning    aspirin (ASPIRIN 81) 81 MG chewable tablet Take 1 tablet by mouth every morning   3/12/2025 Morning    buPROPion (WELLBUTRIN SR) 150 MG 12 hr tablet Take 150 mg by mouth every morning    3/12/2025 Morning    Carboxymethylcellulose Sodium 0.25 % SOLN Apply 1 drop to eye daily as needed   Taking As Needed    citalopram (CELEXA) 40 MG tablet Take 40 mg by mouth daily (with lunch)   3/12/2025    ferrous sulfate (FEROSUL) 325 (65 Fe) MG tablet Take 325 mg by mouth every other day.   3/12/2025 Morning    furosemide (LASIX) 20 MG tablet Take 20 mg by mouth 2 times daily.   3/12/2025 Morning    gabapentin (NEURONTIN) 100 MG capsule Take 100 mg by mouth 3 times daily   3/12/2025 Morning    magnesium oxide (MAG-OX) 400 MG tablet Take 1 tablet by mouth every morning   3/12/2025    minocycline (DYNACIN) 50 MG tablet Take 1 tablet (50 mg) by mouth daily. Start after finishing doxycycline course 90 tablet 3 3/12/2025    multivitamin w/minerals (CENTRUM ADULTS) tablet Take 1 tablet by mouth every morning   3/12/2025    naproxen (NAPROSYN) 500 MG tablet Take 500 mg by mouth daily as needed.   Taking As Needed    nitroGLYcerin (NITROSTAT) 0.4 MG sublingual  tablet PLACE 1 TABLET UNDER TONGUE EVERY 5 MINTUES AS NEEDED FOR CHEST PAIN FOR UP TO 30 DAYS   Taking    nystatin (MYCOSTATIN) 854568 UNIT/GM external powder Apply topically 2 times daily as needed.   Taking As Needed    pantoprazole (PROTONIX) 40 MG EC tablet Take 40 mg by mouth every morning   3/12/2025 Morning    potassium chloride ER (K-TAB) 20 MEQ CR tablet Take 20 mEq by mouth 2 times daily.   3/12/2025 Morning    rOPINIRole (REQUIP) 1 MG tablet Take 0.5 mg by mouth 2 times daily. Take one-half tablet (dose = 0.5 mg) twice daily. Once in the morning and once about dinnertime.     In addition, take one tablet (1 mg) at bedtime.   3/12/2025 Morning    simvastatin (ZOCOR) 40 MG tablet [SIMVASTATIN (ZOCOR) 40 MG TABLET] Take 40 mg by mouth bedtime.   3/11/2025 Evening    spironolactone (ALDACTONE) 25 MG tablet Take 25 mg by mouth every morning   3/12/2025    vitamin B-Complex Take 1 tablet by mouth daily.   3/12/2025    vitamin D3 (CHOLECALCIFEROL) 250 mcg (83836 units) capsule Take 1 capsule by mouth daily.   3/12/2025    Wound Cleansers (VASHE WOUND THERAPY EX) Externally apply topically daily as needed   Taking As Needed    zinc 50 MG TABS Take 1 tablet by mouth three times a week. On Tues, Thur, Sat   3/11/2025 Morning    rOPINIRole (REQUIP) 1 MG tablet Take 1 mg by mouth at bedtime. In addition, take one-half tablet (dose = 0.5 mg) twice daily. Once in the morning and once about dinnertime.   Bedtime       Review of Systems  Complete ROS reviewed, unless noted in HPI, all other systems reviewed (with patient) and all others found to be negative.      Objective:     Physical Exam:  /56 (BP Location: Left arm)   Pulse 74   Temp 98.4  F (36.9  C) (Oral)   Resp 18   Ht 1.524 m (5')   Wt 116 kg (255 lb 11.7 oz)   SpO2 97%   BMI 49.94 kg/m    Weight:   Vitals:    03/12/25 1234 03/14/25 1452   Weight: 112.9 kg (249 lb) 116 kg (255 lb 11.7 oz)      Body mass index is 49.94 kg/m .    General Appearance:   Alert, cooperative, no distress, up in a chair   Head:  Normocephalic, without obvious abnormality, atraumatic   Eyes:  PERRL, conjunctiva/corneas clear, EOM's intact   ENT/Throat: Lips, mucosa, and tongue normal; teeth and gums normal   Lymph/Neck: Supple, symmetrical, trachea midline   Lungs:   Clear to auscultation bilaterally, respirations unlabored, nasal cannula oxygen   Chest Wall:  No tenderness or deformity   Cardiovascular/Heart:  Regular rate and rhythm, S1, S2 normal,no murmur, rub or gallop.    Abdomen:   Soft, non-tender, bowel sounds active all four quadrants,  no masses, no organomegaly   Musculoskeletal: Bilateral lower extremity edema left greater than right, left lower extremity erythema and edema with some blistering along the lateral left hip, right lower leg with multiple wounds   Neurologic: Alert and oriented X 3, Moves all 4 extremities     Psych: Affect is appropriate     Imaging: Reviewed I have personally reviewed pertinent notes, labs, tests, and radiologic imaging in patient's chart.  Labs: Reviewed I have personally reviewed pertinent notes, labs, tests, and radiologic imaging in patient's chart.  Notes: Reviewed I have personally reviewed pertinent notes, labs, tests, and radiologic imaging in patient's chart.    Total time spent 65 minutes with greater than 50% in consultation, education and coordination of care.   Treatment plan includes: multimodal pain approach, Hospital Medicine Service for medical management, infectious disease, wound care, gastroenterology.   Patient educated regarding: multimodal pain approach and medications as listed above.   Elements of Medical Decision Making as described above. Acute or chronic illness or injury or surgery. High risk therapy including opioids, high risk drug therapy including oral and/or parenteral controlled substances.    Patient is understanding of the plan. All questions and concerns addressed to patient's satisfaction.     Thank you  for this consultation.    PETER ConnorP-C  Acute Care Inpatient Pain Management Program  [facility] ([facility])  Hours of coverage Monday-Friday [phone]. After hours please contact Primary team   Page via Epic chat or [employer]

## 2025-03-19 NOTE — PLAN OF CARE
Goal Outcome Evaluation:      Plan of Care Reviewed With: patient    Overall Patient Progress: improvingOverall Patient Progress: improving     Patient reporting moderate to severe pain in BLE this shift. Gave Dilaudid 0.5 mg IV x2 and Dilaudid 2 mg PO x1 this shift. Perineum raw and bleeding, obtained order for calmoseptine. BLE dressings clean dry and intact. Patient reporting intermittent anxiety.

## 2025-03-19 NOTE — H&P (VIEW-ONLY)
GI CONSULT NOTE      Name: Elizabeth Kelley  Medical Record #: [id number]  YOB: 1945  Date of Admission: 3/12/2025  Date/Time: 3/19/2025/8:52 AM     CHIEF COMPLAINT: anemia     HISTORY OF PRESENT ILLNESS: This is a 79 year old year old White patient seen at the request of Dr. Maldonado with a history of HLD, obesity, GERD, MS, and prior cellulitis who presented 3/12 with lower extremity pain and redness.  She is being treated for left leg cellulitis, and ID is following.    We were asked to see her because of anemia with hemoglobin 7.9 on admission and slowly dropped to 6.2 yesterday, and hemoglobin today is 7.4 after transfusion.  She takes oral iron at home but only uses this every other day to 2 nausea.  Typically she has dark brown bowel movements on a regular basis without feeling constipated, no diarrhea, no rectal bleeding.  She does report intermittent black stool at home in the past with the last black stool about a month ago.    For chronic lower extremity pain she was using Tylenol though this was not helpful.  She added Aleve twice daily for about 2 to 3 months but stopped this last week due to nausea and bilateral flank pain which has resolved.  She denies having any abdominal pain.  No recent hematemesis.  She vomited yesterday during a coughing fit, but otherwise no vomiting.  She does endorse mild early satiety.  Generally her appetite is good.  She says she has lost a few pounds of weight recently.    Prior GI workup:  9/2022 colonoscopy (screening)-diverticulosis otherwise normal.  Further screening not recommended  3/2015 EGD (melena)- gastric ulcer, biopsy negative for H. pylori.  2-month follow-up EGD recommended but not completed.  2006 colonoscopy (screening)-diverticulosis otherwise normal    PAST MEDICAL HISTORY:  Past Medical History:   Diagnosis Date    Ankle contracture, left     Class 3 severe obesity due to excess calories without serious comorbidity with body mass  index (BMI) of 45.0 to 49.9 in adult (H)     Combined gastric and duodenal ulcer     Controlled substance agreement broken     Depression     Dyslipidemia     Edema     Edema     Gait abnormality     GERD (gastroesophageal reflux disease)     Gout     Lymphedema of both lower extremities     Melanoma (H)     left upper arm    MS (multiple sclerosis) (H)     RLS (restless legs syndrome)     Skin ulcer of left lower leg (H)     Valgus deformity of both feet     Vitamin D deficiency        FAMILY HISTORY:  Family History   Problem Relation Age of Onset    Uterine Cancer Mother     Hyperlipidemia Mother     Hypertension Mother     Multiple Sclerosis Mother     Asthma Sister     Obesity Sister     Hyperlipidemia Sister     Hypertension Sister     Multiple Sclerosis Sister     Arthritis Sister     Colon Cancer Paternal Aunt     Breast Cancer Paternal Aunt     Hypertension Paternal Aunt     Hyperlipidemia Paternal Aunt     Brain Cancer Father     Arthritis Maternal Uncle     Arthritis Maternal Grandmother     Early Death Maternal Grandfather     Arthritis Paternal Grandmother     Arthritis Paternal Grandfather        SOCIAL HISTORY:  Social History     Socioeconomic History    Marital status: Single     Spouse name: Not on file    Number of children: Not on file    Years of education: Not on file    Highest education level: Not on file   Occupational History    Not on file   Tobacco Use    Smoking status: Former     Current packs/day: 0.00     Average packs/day: 0.3 packs/day for 12.0 years (3.0 ttl pk-yrs)     Types: Cigarettes     Start date: 1963     Quit date: 1975     Years since quittin.2    Smokeless tobacco: Never    Tobacco comments:     quit more than 30 years ago   Substance and Sexual Activity    Alcohol use: Yes     Alcohol/week: 1.0 standard drink of alcohol     Types: 1 Standard drinks or equivalent per week     Comment: glass of wine once a week    Drug use: No    Sexual activity: Yes      Partners: Male     Birth control/protection: Post-menopausal   Other Topics Concern    Parent/sibling w/ CABG, MI or angioplasty before 65F 55M? Not Asked   Social History Narrative    .   Has significant other.  2 grown children.  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       Social Drivers of Health     Financial Resource Strain: Low Risk  (3/14/2025)    Financial Resource Strain     Within the past 12 months, have you or your family members you live with been unable to get utilities (heat, electricity) when it was really needed?: No   Food Insecurity: Low Risk  (3/14/2025)    Food Insecurity     Within the past 12 months, did you worry that your food would run out before you got money to buy more?: No     Within the past 12 months, did the food you bought just not last and you didn t have money to get more?: No   Transportation Needs: Low Risk  (3/14/2025)    Transportation Needs     Within the past 12 months, has lack of transportation kept you from medical appointments, getting your medicines, non-medical meetings or appointments, work, or from getting things that you need?: No   Physical Activity: Not on file   Stress: Not on file   Social Connections: Not on file   Interpersonal Safety: Low Risk  (3/14/2025)    Interpersonal Safety     Do you feel physically and emotionally safe where you currently live?: Yes     Within the past 12 months, have you been hit, slapped, kicked or otherwise physically hurt by someone?: No     Within the past 12 months, have you been humiliated or emotionally abused in other ways by your partner or ex-partner?: No   Housing Stability: Low Risk  (3/14/2025)    Housing Stability     Do you have housing? : Yes     Are you worried about losing your housing?: No       MEDICATIONS PRIOR TO ADMISSION:   Medications Prior to Admission   Medication Sig Dispense Refill Last Dose/Taking    acetaminophen (TYLENOL) 500 MG tablet Take 1,000 mg by mouth daily as needed for pain.    Taking As Needed    ascorbic acid (VITAMIN C) 250 MG CHEW chewable tablet Take 250 mg by mouth daily.   3/12/2025 Morning    aspirin (ASPIRIN 81) 81 MG chewable tablet Take 1 tablet by mouth every morning   3/12/2025 Morning    buPROPion (WELLBUTRIN SR) 150 MG 12 hr tablet Take 150 mg by mouth every morning    3/12/2025 Morning    Carboxymethylcellulose Sodium 0.25 % SOLN Apply 1 drop to eye daily as needed   Taking As Needed    citalopram (CELEXA) 40 MG tablet Take 40 mg by mouth daily (with lunch)   3/12/2025    ferrous sulfate (FEROSUL) 325 (65 Fe) MG tablet Take 325 mg by mouth every other day.   3/12/2025 Morning    furosemide (LASIX) 20 MG tablet Take 20 mg by mouth 2 times daily.   3/12/2025 Morning    gabapentin (NEURONTIN) 100 MG capsule Take 100 mg by mouth 3 times daily   3/12/2025 Morning    magnesium oxide (MAG-OX) 400 MG tablet Take 1 tablet by mouth every morning   3/12/2025    minocycline (DYNACIN) 50 MG tablet Take 1 tablet (50 mg) by mouth daily. Start after finishing doxycycline course 90 tablet 3 3/12/2025    multivitamin w/minerals (CENTRUM ADULTS) tablet Take 1 tablet by mouth every morning   3/12/2025    naproxen (NAPROSYN) 500 MG tablet Take 500 mg by mouth daily as needed.   Taking As Needed    nitroGLYcerin (NITROSTAT) 0.4 MG sublingual tablet PLACE 1 TABLET UNDER TONGUE EVERY 5 MINTUES AS NEEDED FOR CHEST PAIN FOR UP TO 30 DAYS   Taking    nystatin (MYCOSTATIN) 972794 UNIT/GM external powder Apply topically 2 times daily as needed.   Taking As Needed    pantoprazole (PROTONIX) 40 MG EC tablet Take 40 mg by mouth every morning   3/12/2025 Morning    potassium chloride ER (K-TAB) 20 MEQ CR tablet Take 20 mEq by mouth 2 times daily.   3/12/2025 Morning    rOPINIRole (REQUIP) 1 MG tablet Take 0.5 mg by mouth 2 times daily. Take one-half tablet (dose = 0.5 mg) twice daily. Once in the morning and once about dinnertime.     In addition, take one tablet (1 mg) at bedtime.   3/12/2025 Morning     simvastatin (ZOCOR) 40 MG tablet [SIMVASTATIN (ZOCOR) 40 MG TABLET] Take 40 mg by mouth bedtime.   3/11/2025 Evening    spironolactone (ALDACTONE) 25 MG tablet Take 25 mg by mouth every morning   3/12/2025    vitamin B-Complex Take 1 tablet by mouth daily.   3/12/2025    vitamin D3 (CHOLECALCIFEROL) 250 mcg (38236 units) capsule Take 1 capsule by mouth daily.   3/12/2025    Wound Cleansers (VASHE WOUND THERAPY EX) Externally apply topically daily as needed   Taking As Needed    zinc 50 MG TABS Take 1 tablet by mouth three times a week. On Tues, Thur, Sat   3/11/2025 Morning    rOPINIRole (REQUIP) 1 MG tablet Take 1 mg by mouth at bedtime. In addition, take one-half tablet (dose = 0.5 mg) twice daily. Once in the morning and once about dinnertime.   Bedtime          ALLERGIES: Other environmental allergy, Adhesive tape, Adhesive [cyanoacrylate], Latex, Ragweeds, Oxycodone-acetaminophen, and Vicodin [hydrocodone-acetaminophen]    REVIEW OF SYSTEMS (ROS): Complete review of systems negative other than listed in HPI.    PHYSICAL EXAM:    /56 (BP Location: Left arm)   Pulse 74   Temp 98.4  F (36.9  C) (Oral)   Resp 18   Ht 1.524 m (5')   Wt 116 kg (255 lb 11.7 oz)   SpO2 97%   BMI 49.94 kg/m      GENERAL: Pleasant, no obvious distress    SKIN: No jaundice    NECK: Supple without adenopathy    EYES: No scleral icterus    LUNGS: Clear to auscultation bilaterally    HEART: Regular rate and rhythm, S1 and S2 present, bilateral lower extremity edema with wraps in place and erythema    ABDOMEN: Soft, non-distended, non-tender, no guarding/rebound/mass, bowel sounds normal, no obvious organomegaly    MUSKULOSKELETAL:  Warm and well perfused, moves all extremities well    NEUROLOGIC: Alert and oriented    PSYCHIATRIC: Normal affect    LAB DATA:  Recent Labs   Lab Test 03/19/25  0654 03/18/25  1708 03/18/25  0812 03/18/25  0658 03/13/25  0814 03/12/25  1600   WBC 14.1*  --  14.0* 14.2*   < > 19.3*   HGB 7.4* 7.5*  6.3* 6.2*   < > 7.9*   MCV 80  --  79 79   < > 78   *  --  444 450   < > 337   INR  --   --   --   --   --  1.11    < > = values in this interval not displayed.     Recent Labs   Lab Test 03/19/25  0654 03/18/25  0658 03/17/25  0626 03/14/25  0611 03/13/25  0814 03/12/25  1600 01/16/25  0657 01/15/25  0426   NA  --   --   --   --  136 138  --  137   POTASSIUM  --   --   --   --  4.1 4.1  --  4.2   CHLORIDE  --   --   --   --  104 105  --  104   CO2  --   --   --   --  22 23 --  25   BUN  --   --   --   --  37.3* 49.7*  --  14.2   CR 1.03* 1.01* 1.13*   < > 1.29* 1.62*   < > 0.94   ANIONGAP  --   --   --   --  10 10  --  8   JUNI  --   --   --   --  8.6* 9.3  --  8.7*   GLC  --   --   --   --  100* 116*  --  96    < > = values in this interval not displayed.     Recent Labs   Lab Test 03/18/25  0658 03/13/25  0814 03/12/25  1600 01/12/25  1412 07/17/24  0705 07/16/24  1404 05/25/22  1637 04/09/22  1512   ALBUMIN 2.2* 2.4* 2.6*  --    < >  --    < >  --    BILITOTAL 0.2 0.2 <0.2  --    < >  --    < >  --    ALT 42 26 27  --    < >  --    < >  --    AST 35 21 15  --    < >  --    < >  --    ALKPHOS 635* 351* 300*  --    < >  --    < >  --    PROTEIN  --   --   --  20*  --  Negative  --  10*    < > = values in this interval not displayed.     Normal folate and B12  Fe 11 low, iron sat 6% low,  low  Haptoglobin 425 high    IMAGING:  US Lower Extremity Arterial Duplex Bilateral  Result Date: 3/18/2025  EXAM: US LOWER EXTREMITY ARTERIAL DUPLEX BILATERAL LOCATION: [facility] DATE: 3/18/2025 INDICATION: Bilateral lower extremity wounds, eval for PAD, decreased lower extremity pulses. COMPARISON: ANSHUL exam performed earlier the same date. TECHNIQUE: Duplex utilizing 2D grayscale imaging. Doppler interrogation with color-flow and spectral waveform analysis. FINDINGS: RIGHT LOWER EXTREMITY ARTERIAL ASSESSMENT: External iliac artery 310 cm/s Common femoral artery: 208 cm/s Profunda femoris  artery: 165 cm/s SFA (proximal): 204 cm/s SFA (mid): 170 cm/s SFA (distal): 193 cm/s Popliteal artery (proximal): 203 cm/s Popliteal artery (distal): 116 cm/s Posterior tibial artery: 84 cm/s Anterior tibial artery: 66 cm/s Dorsalis pedis artery: 83 cm/s LEFT LOWER EXTREMITY ARTERIAL ASSESSMENT: External iliac artery 284 cm/s Common femoral artery: 235 cm/s Profunda femoris artery: 178 cm/s SFA (proximal): 202 cm/s SFA (mid): 187 cm/s SFA (distal): 193 cm/s Popliteal artery (proximal): 188 cm/s Popliteal artery (distal): 108 cm/s Posterior tibial artery: 72 cm/s Anterior tibial artery: 71 cm/s Dorsalis pedis artery: 133 cm/s     IMPRESSION: 1.  RIGHT LOWER EXTREMITY: Patent vasculature with multiphasic waveforms. No significant stenosis identified. 2.  LEFT LOWER EXTREMITY: Monophasic waveforms in the external iliac artery, suggesting iliac inflow disease. The remaining vasculature is patent without significant stenosis.    US ANSHUL with PPG wo Exercise  Result Date: 3/18/2025  EXAM: RESTING ANKLE-BRACHIAL INDICES (ABIs) LOCATION: [facility] DATE: 3/18/2025 INDICATION: Bilateral lower extremity wounds, evaluate for PAD COMPARISON: None. ANSHUL FINDINGS: RIGHT Brachial: 140 Ankle (PT): 136 Index: 0.97 Ankle (DP): 128 Index: 0.91 Digit: 101 Index: 0.72 LEFT Brachial: 128 Ankle (PT): 137 Index: 0.98 Ankle (DP): 124 Index: 0.89 Digit: 128 Index: 0.84 The right ANSHUL at rest is 0.97. The left ANSHUL at rest is 0.98.  WAVEFORMS: The dorsalis pedis and posterior tibial arteries are multiphasic. Bilateral digital artery waveforms are normal appearing..     IMPRESSION: 1.  RIGHT LOWER EXTREMITY: ANSHUL at rest is normal. 2.  LEFT LOWER EXTREMITY: ANSHUL at rest is normal.    ASSESSMENT:  She is a 79 year old female with a past medical history of obesity, hyperlipidemia, GERD, admitted with lower extremity cellulitis on antibiotics.  We are seeing her for normocytic anemia, and she reports having black stools at home more  than a month ago.  This does not seem like active GI bleeding at this time, though she could have an upper GI bleeding source such as peptic ulcer disease especially given her recent use of Aleve for leg pain and her prior history of gastric ulcer in 2015.  This more likely represents anemia of chronic disease with iron levels and IBC low.  Latest colonoscopy in 9/2022 was unremarkable.    Principal Problem:    Cellulitis of left lower extremity  Active Problems:    Acquired lymphedema of leg    Morbid obesity (H)    Peripheral edema    KIKE (acute kidney injury)    PLAN:  Discussed with Dr. Rome.  50 minutes of total time was spent providing patient care, including patient evaluation, reviewing documentation/test results, and .    Continue PPI but increase dose to twice daily.  Recommend outpatient EGD to recheck prior gastric ulcer and for anemia evaluation.  Recommend avoidance of NSAIDS.  Trend hemoglobin.  GI following.    GI ATTENDING BRIEF ADDENDUM:  The patient was seen and examined.  Discussed with REBECCA Ivory.  Agree with findings and plan as above with the following addendum. 33 minutes were spent on providing patient care, including patient evaluation, reviewing documentation/test results, and , in addition to the time spent by the NP/PA.    Suspect that anemia is likely multifactorial.  No overt evidence of GI blood loss reported while inpatient.  Given history agree that outpatient upper endoscopy we helpful to evaluate prior gastric ulcer.  Should patient develop overt evidence of GI blood loss we can certainly do that while inpatient.  Will continue to follow.    Mazin Pino, DO  Gastroenterology  [facility]                                                     Luh Morrison PA-C  Thank you for the opportunity to participate in the care of this patient.   Please feel free to call me with any questions or concerns.  Phone number [phone].                     CC: [facility], Yung Herrmann

## 2025-03-19 NOTE — CONSULTS
GI CONSULT NOTE      Name: Elizabeth Kelley  Medical Record #: [id number]  YOB: 1945  Date of Admission: 3/12/2025  Date/Time: 3/19/2025/8:52 AM     CHIEF COMPLAINT: anemia     HISTORY OF PRESENT ILLNESS: This is a 79 year old year old White patient seen at the request of Dr. Maldonado with a history of HLD, obesity, GERD, MS, and prior cellulitis who presented 3/12 with lower extremity pain and redness.  She is being treated for left leg cellulitis, and ID is following.    We were asked to see her because of anemia with hemoglobin 7.9 on admission and slowly dropped to 6.2 yesterday, and hemoglobin today is 7.4 after transfusion.  She takes oral iron at home but only uses this every other day to 2 nausea.  Typically she has dark brown bowel movements on a regular basis without feeling constipated, no diarrhea, no rectal bleeding.  She does report intermittent black stool at home in the past with the last black stool about a month ago.    For chronic lower extremity pain she was using Tylenol though this was not helpful.  She added Aleve twice daily for about 2 to 3 months but stopped this last week due to nausea and bilateral flank pain which has resolved.  She denies having any abdominal pain.  No recent hematemesis.  She vomited yesterday during a coughing fit, but otherwise no vomiting.  She does endorse mild early satiety.  Generally her appetite is good.  She says she has lost a few pounds of weight recently.    Prior GI workup:  9/2022 colonoscopy (screening)-diverticulosis otherwise normal.  Further screening not recommended  3/2015 EGD (melena)- gastric ulcer, biopsy negative for H. pylori.  2-month follow-up EGD recommended but not completed.  2006 colonoscopy (screening)-diverticulosis otherwise normal    PAST MEDICAL HISTORY:  Past Medical History:   Diagnosis Date    Ankle contracture, left     Class 3 severe obesity due to excess calories without serious comorbidity with body mass  index (BMI) of 45.0 to 49.9 in adult (H)     Combined gastric and duodenal ulcer     Controlled substance agreement broken     Depression     Dyslipidemia     Edema     Edema     Gait abnormality     GERD (gastroesophageal reflux disease)     Gout     Lymphedema of both lower extremities     Melanoma (H)     left upper arm    MS (multiple sclerosis) (H)     RLS (restless legs syndrome)     Skin ulcer of left lower leg (H)     Valgus deformity of both feet     Vitamin D deficiency        FAMILY HISTORY:  Family History   Problem Relation Age of Onset    Uterine Cancer Mother     Hyperlipidemia Mother     Hypertension Mother     Multiple Sclerosis Mother     Asthma Sister     Obesity Sister     Hyperlipidemia Sister     Hypertension Sister     Multiple Sclerosis Sister     Arthritis Sister     Colon Cancer Paternal Aunt     Breast Cancer Paternal Aunt     Hypertension Paternal Aunt     Hyperlipidemia Paternal Aunt     Brain Cancer Father     Arthritis Maternal Uncle     Arthritis Maternal Grandmother     Early Death Maternal Grandfather     Arthritis Paternal Grandmother     Arthritis Paternal Grandfather        SOCIAL HISTORY:  Social History     Socioeconomic History    Marital status: Single     Spouse name: Not on file    Number of children: Not on file    Years of education: Not on file    Highest education level: Not on file   Occupational History    Not on file   Tobacco Use    Smoking status: Former     Current packs/day: 0.00     Average packs/day: 0.3 packs/day for 12.0 years (3.0 ttl pk-yrs)     Types: Cigarettes     Start date: 1963     Quit date: 1975     Years since quittin.2    Smokeless tobacco: Never    Tobacco comments:     quit more than 30 years ago   Substance and Sexual Activity    Alcohol use: Yes     Alcohol/week: 1.0 standard drink of alcohol     Types: 1 Standard drinks or equivalent per week     Comment: glass of wine once a week    Drug use: No    Sexual activity: Yes      Partners: Male     Birth control/protection: Post-menopausal   Other Topics Concern    Parent/sibling w/ CABG, MI or angioplasty before 65F 55M? Not Asked   Social History Narrative    .   Has significant other.  2 grown children.  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       Social Drivers of Health     Financial Resource Strain: Low Risk  (3/14/2025)    Financial Resource Strain     Within the past 12 months, have you or your family members you live with been unable to get utilities (heat, electricity) when it was really needed?: No   Food Insecurity: Low Risk  (3/14/2025)    Food Insecurity     Within the past 12 months, did you worry that your food would run out before you got money to buy more?: No     Within the past 12 months, did the food you bought just not last and you didn t have money to get more?: No   Transportation Needs: Low Risk  (3/14/2025)    Transportation Needs     Within the past 12 months, has lack of transportation kept you from medical appointments, getting your medicines, non-medical meetings or appointments, work, or from getting things that you need?: No   Physical Activity: Not on file   Stress: Not on file   Social Connections: Not on file   Interpersonal Safety: Low Risk  (3/14/2025)    Interpersonal Safety     Do you feel physically and emotionally safe where you currently live?: Yes     Within the past 12 months, have you been hit, slapped, kicked or otherwise physically hurt by someone?: No     Within the past 12 months, have you been humiliated or emotionally abused in other ways by your partner or ex-partner?: No   Housing Stability: Low Risk  (3/14/2025)    Housing Stability     Do you have housing? : Yes     Are you worried about losing your housing?: No       MEDICATIONS PRIOR TO ADMISSION:   Medications Prior to Admission   Medication Sig Dispense Refill Last Dose/Taking    acetaminophen (TYLENOL) 500 MG tablet Take 1,000 mg by mouth daily as needed for pain.    Taking As Needed    ascorbic acid (VITAMIN C) 250 MG CHEW chewable tablet Take 250 mg by mouth daily.   3/12/2025 Morning    aspirin (ASPIRIN 81) 81 MG chewable tablet Take 1 tablet by mouth every morning   3/12/2025 Morning    buPROPion (WELLBUTRIN SR) 150 MG 12 hr tablet Take 150 mg by mouth every morning    3/12/2025 Morning    Carboxymethylcellulose Sodium 0.25 % SOLN Apply 1 drop to eye daily as needed   Taking As Needed    citalopram (CELEXA) 40 MG tablet Take 40 mg by mouth daily (with lunch)   3/12/2025    ferrous sulfate (FEROSUL) 325 (65 Fe) MG tablet Take 325 mg by mouth every other day.   3/12/2025 Morning    furosemide (LASIX) 20 MG tablet Take 20 mg by mouth 2 times daily.   3/12/2025 Morning    gabapentin (NEURONTIN) 100 MG capsule Take 100 mg by mouth 3 times daily   3/12/2025 Morning    magnesium oxide (MAG-OX) 400 MG tablet Take 1 tablet by mouth every morning   3/12/2025    minocycline (DYNACIN) 50 MG tablet Take 1 tablet (50 mg) by mouth daily. Start after finishing doxycycline course 90 tablet 3 3/12/2025    multivitamin w/minerals (CENTRUM ADULTS) tablet Take 1 tablet by mouth every morning   3/12/2025    naproxen (NAPROSYN) 500 MG tablet Take 500 mg by mouth daily as needed.   Taking As Needed    nitroGLYcerin (NITROSTAT) 0.4 MG sublingual tablet PLACE 1 TABLET UNDER TONGUE EVERY 5 MINTUES AS NEEDED FOR CHEST PAIN FOR UP TO 30 DAYS   Taking    nystatin (MYCOSTATIN) 438332 UNIT/GM external powder Apply topically 2 times daily as needed.   Taking As Needed    pantoprazole (PROTONIX) 40 MG EC tablet Take 40 mg by mouth every morning   3/12/2025 Morning    potassium chloride ER (K-TAB) 20 MEQ CR tablet Take 20 mEq by mouth 2 times daily.   3/12/2025 Morning    rOPINIRole (REQUIP) 1 MG tablet Take 0.5 mg by mouth 2 times daily. Take one-half tablet (dose = 0.5 mg) twice daily. Once in the morning and once about dinnertime.     In addition, take one tablet (1 mg) at bedtime.   3/12/2025 Morning     simvastatin (ZOCOR) 40 MG tablet [SIMVASTATIN (ZOCOR) 40 MG TABLET] Take 40 mg by mouth bedtime.   3/11/2025 Evening    spironolactone (ALDACTONE) 25 MG tablet Take 25 mg by mouth every morning   3/12/2025    vitamin B-Complex Take 1 tablet by mouth daily.   3/12/2025    vitamin D3 (CHOLECALCIFEROL) 250 mcg (05551 units) capsule Take 1 capsule by mouth daily.   3/12/2025    Wound Cleansers (VASHE WOUND THERAPY EX) Externally apply topically daily as needed   Taking As Needed    zinc 50 MG TABS Take 1 tablet by mouth three times a week. On Tues, Thur, Sat   3/11/2025 Morning    rOPINIRole (REQUIP) 1 MG tablet Take 1 mg by mouth at bedtime. In addition, take one-half tablet (dose = 0.5 mg) twice daily. Once in the morning and once about dinnertime.   Bedtime          ALLERGIES: Other environmental allergy, Adhesive tape, Adhesive [cyanoacrylate], Latex, Ragweeds, Oxycodone-acetaminophen, and Vicodin [hydrocodone-acetaminophen]    REVIEW OF SYSTEMS (ROS): Complete review of systems negative other than listed in HPI.    PHYSICAL EXAM:    /56 (BP Location: Left arm)   Pulse 74   Temp 98.4  F (36.9  C) (Oral)   Resp 18   Ht 1.524 m (5')   Wt 116 kg (255 lb 11.7 oz)   SpO2 97%   BMI 49.94 kg/m      GENERAL: Pleasant, no obvious distress    SKIN: No jaundice    NECK: Supple without adenopathy    EYES: No scleral icterus    LUNGS: Clear to auscultation bilaterally    HEART: Regular rate and rhythm, S1 and S2 present, bilateral lower extremity edema with wraps in place and erythema    ABDOMEN: Soft, non-distended, non-tender, no guarding/rebound/mass, bowel sounds normal, no obvious organomegaly    MUSKULOSKELETAL:  Warm and well perfused, moves all extremities well    NEUROLOGIC: Alert and oriented    PSYCHIATRIC: Normal affect    LAB DATA:  Recent Labs   Lab Test 03/19/25  0654 03/18/25  1708 03/18/25  0812 03/18/25  0658 03/13/25  0814 03/12/25  1600   WBC 14.1*  --  14.0* 14.2*   < > 19.3*   HGB 7.4* 7.5*  6.3* 6.2*   < > 7.9*   MCV 80  --  79 79   < > 78   *  --  444 450   < > 337   INR  --   --   --   --   --  1.11    < > = values in this interval not displayed.     Recent Labs   Lab Test 03/19/25  0654 03/18/25  0658 03/17/25  0626 03/14/25  0611 03/13/25  0814 03/12/25  1600 01/16/25  0657 01/15/25  0426   NA  --   --   --   --  136 138  --  137   POTASSIUM  --   --   --   --  4.1 4.1  --  4.2   CHLORIDE  --   --   --   --  104 105  --  104   CO2  --   --   --   --  22 23 --  25   BUN  --   --   --   --  37.3* 49.7*  --  14.2   CR 1.03* 1.01* 1.13*   < > 1.29* 1.62*   < > 0.94   ANIONGAP  --   --   --   --  10 10  --  8   JUNI  --   --   --   --  8.6* 9.3  --  8.7*   GLC  --   --   --   --  100* 116*  --  96    < > = values in this interval not displayed.     Recent Labs   Lab Test 03/18/25  0658 03/13/25  0814 03/12/25  1600 01/12/25  1412 07/17/24  0705 07/16/24  1404 05/25/22  1637 04/09/22  1512   ALBUMIN 2.2* 2.4* 2.6*  --    < >  --    < >  --    BILITOTAL 0.2 0.2 <0.2  --    < >  --    < >  --    ALT 42 26 27  --    < >  --    < >  --    AST 35 21 15  --    < >  --    < >  --    ALKPHOS 635* 351* 300*  --    < >  --    < >  --    PROTEIN  --   --   --  20*  --  Negative  --  10*    < > = values in this interval not displayed.     Normal folate and B12  Fe 11 low, iron sat 6% low,  low  Haptoglobin 425 high    IMAGING:  US Lower Extremity Arterial Duplex Bilateral  Result Date: 3/18/2025  EXAM: US LOWER EXTREMITY ARTERIAL DUPLEX BILATERAL LOCATION: [facility] DATE: 3/18/2025 INDICATION: Bilateral lower extremity wounds, eval for PAD, decreased lower extremity pulses. COMPARISON: ANSHUL exam performed earlier the same date. TECHNIQUE: Duplex utilizing 2D grayscale imaging. Doppler interrogation with color-flow and spectral waveform analysis. FINDINGS: RIGHT LOWER EXTREMITY ARTERIAL ASSESSMENT: External iliac artery 310 cm/s Common femoral artery: 208 cm/s Profunda femoris  artery: 165 cm/s SFA (proximal): 204 cm/s SFA (mid): 170 cm/s SFA (distal): 193 cm/s Popliteal artery (proximal): 203 cm/s Popliteal artery (distal): 116 cm/s Posterior tibial artery: 84 cm/s Anterior tibial artery: 66 cm/s Dorsalis pedis artery: 83 cm/s LEFT LOWER EXTREMITY ARTERIAL ASSESSMENT: External iliac artery 284 cm/s Common femoral artery: 235 cm/s Profunda femoris artery: 178 cm/s SFA (proximal): 202 cm/s SFA (mid): 187 cm/s SFA (distal): 193 cm/s Popliteal artery (proximal): 188 cm/s Popliteal artery (distal): 108 cm/s Posterior tibial artery: 72 cm/s Anterior tibial artery: 71 cm/s Dorsalis pedis artery: 133 cm/s     IMPRESSION: 1.  RIGHT LOWER EXTREMITY: Patent vasculature with multiphasic waveforms. No significant stenosis identified. 2.  LEFT LOWER EXTREMITY: Monophasic waveforms in the external iliac artery, suggesting iliac inflow disease. The remaining vasculature is patent without significant stenosis.    US ANSHUL with PPG wo Exercise  Result Date: 3/18/2025  EXAM: RESTING ANKLE-BRACHIAL INDICES (ABIs) LOCATION: [facility] DATE: 3/18/2025 INDICATION: Bilateral lower extremity wounds, evaluate for PAD COMPARISON: None. ANSHUL FINDINGS: RIGHT Brachial: 140 Ankle (PT): 136 Index: 0.97 Ankle (DP): 128 Index: 0.91 Digit: 101 Index: 0.72 LEFT Brachial: 128 Ankle (PT): 137 Index: 0.98 Ankle (DP): 124 Index: 0.89 Digit: 128 Index: 0.84 The right ANSHUL at rest is 0.97. The left ANSHUL at rest is 0.98.  WAVEFORMS: The dorsalis pedis and posterior tibial arteries are multiphasic. Bilateral digital artery waveforms are normal appearing..     IMPRESSION: 1.  RIGHT LOWER EXTREMITY: ANSHUL at rest is normal. 2.  LEFT LOWER EXTREMITY: ANSHUL at rest is normal.    ASSESSMENT:  She is a 79 year old female with a past medical history of obesity, hyperlipidemia, GERD, admitted with lower extremity cellulitis on antibiotics.  We are seeing her for normocytic anemia, and she reports having black stools at home more  than a month ago.  This does not seem like active GI bleeding at this time, though she could have an upper GI bleeding source such as peptic ulcer disease especially given her recent use of Aleve for leg pain and her prior history of gastric ulcer in 2015.  This more likely represents anemia of chronic disease with iron levels and IBC low.  Latest colonoscopy in 9/2022 was unremarkable.    Principal Problem:    Cellulitis of left lower extremity  Active Problems:    Acquired lymphedema of leg    Morbid obesity (H)    Peripheral edema    KIKE (acute kidney injury)    PLAN:  Discussed with Dr. Rome.  50 minutes of total time was spent providing patient care, including patient evaluation, reviewing documentation/test results, and .    Continue PPI but increase dose to twice daily.  Recommend outpatient EGD to recheck prior gastric ulcer and for anemia evaluation.  Recommend avoidance of NSAIDS.  Trend hemoglobin.  GI following.    GI ATTENDING BRIEF ADDENDUM:  The patient was seen and examined.  Discussed with REBECCA Ivory.  Agree with findings and plan as above with the following addendum. 33 minutes were spent on providing patient care, including patient evaluation, reviewing documentation/test results, and , in addition to the time spent by the NP/PA.    Suspect that anemia is likely multifactorial.  No overt evidence of GI blood loss reported while inpatient.  Given history agree that outpatient upper endoscopy we helpful to evaluate prior gastric ulcer.  Should patient develop overt evidence of GI blood loss we can certainly do that while inpatient.  Will continue to follow.    Mazin Pino, DO  Gastroenterology  [facility]                                                     Luh Morrison PA-C  Thank you for the opportunity to participate in the care of this patient.   Please feel free to call me with any questions or concerns.  Phone number [phone].                     CC: [facility], Yung Herrmann

## 2025-03-19 NOTE — PLAN OF CARE
Problem: Adult Inpatient Plan of Care  Goal: Absence of Hospital-Acquired Illness or Injury  Intervention: Prevent Infection  Recent Flowsheet Documentation  Taken 3/18/2025 2328 by Osiris Campos RN  Infection Prevention:   rest/sleep promoted   single patient room provided   hand hygiene promoted     Problem: Adult Inpatient Plan of Care  Goal: Optimal Comfort and Wellbeing  Outcome: Progressing  Intervention: Provide Person-Centered Care  Recent Flowsheet Documentation  Taken 3/18/2025 2328 by Osiris Campos RN  Trust Relationship/Rapport:   care explained   reassurance provided   thoughts/feelings acknowledged   emotional support provided     Problem: Delirium  Goal: Improved Attention and Thought Clarity  Outcome: Progressing  Intervention: Maximize Cognitive Function  Recent Flowsheet Documentation  Taken 3/18/2025 2328 by Osiris Campos RN  Sensory Stimulation Regulation:   quiet environment promoted   care clustered  Reorientation Measures: clock in view     Problem: Pain Acute  Goal: Optimal Pain Control and Function  Outcome: Progressing  Intervention: Prevent or Manage Pain  Recent Flowsheet Documentation  Taken 3/18/2025 2328 by Osiris Campos RN  Sensory Stimulation Regulation:   quiet environment promoted   care clustered  Medication Review/Management: medications reviewed     Problem: Skin Injury Risk Increased  Goal: Skin Health and Integrity  Intervention: Optimize Skin Protection  Recent Flowsheet Documentation  Taken 3/18/2025 2328 by Osiris Campos RN  Activity Management:   activity adjusted per tolerance   activity encouraged   Goal Outcome Evaluation:      Plan of Care Reviewed With: patient    Overall Patient Progress: improvingOverall Patient Progress: improving     NURSING PROGRESS NOTE  Shift Summary      Date: March 19, 2025     Neuro/Musculoskeletal:  A&Ox4.   Cardiac:  VSS.     Respiratory:  Sating in the 90s on 3L O2 NC.  GI/:  Adequate urine output via external  catheter  Diet/Appetite:  Tolerating reg diet.  Activity:  Assist of 2   Pain:  managed with PRN dilaudid  Skin:  No new deficits noted. Dressing clean, dry, intact  LDAs + Drips/IVF:  CORY Campos RN  ....................................................

## 2025-03-19 NOTE — PROGRESS NOTES
"CLINICAL NUTRITION SERVICES - ASSESSMENT NOTE    RECOMMENDATIONS FOR MDs/PROVIDERS TO ORDER:    Malnutrition Status:    Not noted    Registered Dietitian Interventions:  Add expedite with lunch trays daily    Future/Additional Recommendations:  Monitor po intake, labs, wound healing     REASON FOR ASSESSMENT  LOS    Pt with a past medical history of recurrent right lower extremity cellulitis, chronic lower extremity lymphedema, chronic venous stasis, chronic anemia, RLS, HLD, GERD who was admitted on 3/12/2025.  She has a history of recurrent hospitalizations for right lower extremity cellulitis.  She presented with left lower extremity erythema, edema, pain and left lower extremity cellulitis    SUBJECTIVE INFORMATION  Assessed patient in room.    NUTRITION HISTORY  Pt does not follow any special diet at home.  She states she tries to get extra protein and drinks a protein shake daily.  Otherwise includes meatt, cheese and other high protein foods.    CURRENT NUTRITION ORDERS  Diet: Regular    CURRENT INTAKE/TOLERANCE  Good po intake: consumed 2231 Calories and 99 g protein on 3/17 and 3073 Calories and 108 g protein on 3/16    LABS  Nutrition-relevant labs: Cr 1.03 (H)    MEDICATIONS  Nutrition-relevant medications:  wellbutrin, rocephin, celexa, lasix, neurontin, pantoprazole, zocor, aldactone , vancomycin    ANTHROPOMETRICS  Height: 152.4 cm (5' 0\")  Most Recent Weight: 116 kg (255 lb 11.7 oz)  IBW: 45.45 kg  % IBW: 255%  BMI (kg/m ): Obesity Class III BMI > 40  Weight History:   Wt Readings from Last 10 Encounters:   03/14/25 116 kg (255 lb 11.7 oz)   01/18/25 116.3 kg (256 lb 4.8 oz)   10/14/24 110.9 kg (244 lb 9.6 oz)   09/10/24 115.8 kg (255 lb 4.7 oz)   09/04/24 112.9 kg (249 lb)   07/20/24 115.9 kg (255 lb 8.2 oz)   07/11/24 118.4 kg (261 lb)   06/06/24 115.2 kg (254 lb)   05/30/24 115.7 kg (255 lb)   04/05/23 117.3 kg (258 lb 9.6 oz)     Dosing Weight: 63 kg, based on adjusted wt    ASSESSED NUTRITION " NEEDS  Estimated Energy Needs: 8460-6250 kcals/day (25 - 30 kcals/kg)  Justification: Obese  Estimated Protein Needs: 76-95 grams protein/day (1.2 - 1.5 grams of pro/kg)  Justification: Wound healing  Estimated Fluid Needs: 8531-3209 mL/day (1 mL/kcal)  Justification: Maintenance    SYSTEM FINDINGS    Skin/wounds: wound-buttocks noted as resolved ( no redness), blister on, thigh, Grapefruit sized wound on leg ( with drainage)  GI symptoms: Last BM 3/17/2025    MALNUTRITION  % Intake: No decreased intake noted  % Weight Loss: None noted  Subcutaneous Fat Loss: None observed  Muscle Loss: None observed  Fluid Accumulation/Edema: Moderately severe, 3+  Malnutrition Diagnosis: Patient does not meet two of the established criteria necessary for diagnosing malnutrition  Malnutrition Present on Admission: No    NUTRITION DIAGNOSIS  Increased nutrient needs    related to skin breakdown as evidenced by wound    INTERVENTIONS  Medical food supplement therapy-add expedite for wound healing    Goals  Wound healing per WOC documentation  Meet nutritional needs     Monitoring/Evaluation  Progress toward goals will be monitored and evaluated per policy.

## 2025-03-19 NOTE — PROGRESS NOTES
Infectious Diseases Progress Note  [facility]    Date of visit: 03/19/2025     ASSESSMENT   79-year-old woman with a history of chronic lymphedema, hyperlipidemia, GERD who was admitted for left leg cellulitis.    Left leg cellulitis.  Presenting with fevers then redness and swelling in the entire left leg up into the buttock.  No open lesions or fluctuant areas.  Limited left lower extremity ultrasound without DVT.  Presenting with leukocytosis and elevated inflammatory markers. Wbc and CRP slow improvement. CT left thigh/pelvis showing possible myositis/fasciitis, no abscess. Improving with antibiotics.    Chronic stasis dermatitis, right leg.  History of recurrent infections in the right leg due to open lesions.  Recently placed on minocycline for lifelong suppression.  (50 mg daily)      Principal Problem:    Cellulitis of left lower extremity  Active Problems:    Acquired lymphedema of leg    Morbid obesity (H)    Peripheral edema    KIKE (acute kidney injury)       PLAN   -continue vancomycin and ceftriaxone   -continues to slowly improve  -trend CRP, WBC      Son Rodriguez MD  [facility]  Direct messaging: [employer]aging   On-Call ID provider: [phone], option: 9         ===========================================      SUBJECTIVE / INTERVAL HISTORY:     Transfusion yesterday. Feeling better today. Tolerating antibiotics.       Antibiotics   Vancomycin 3/12-  Ceftriaxone 3/17-    Previous:  Zosyn 3/12-3/17  Minocycline prior to arrival      Physical Exam     Temp:  [98.3  F (36.8  C)-99.2  F (37.3  C)] 98.4  F (36.9  C)  Pulse:  [74-85] 74  Resp:  [16-20] 18  BP: (108-131)/(53-67) 120/56  SpO2:  [91 %-97 %] 97 %    /56 (BP Location: Left arm)   Pulse 74   Temp 98.4  F (36.9  C) (Oral)   Resp 18   Ht 1.524 m (5')   Wt 116 kg (255 lb 11.7 oz)   SpO2 97%   BMI 49.94 kg/m      GENERAL:  well-developed, well-nourished, lying in bed in no acute distress.   HENT:   Head is normocephalic, atraumatic.   EYES:  Eyes have anicteric sclerae without conjunctival injection   LUNGS:  normal respiratory pattern   EXT: improving erythema on left leg, . Blistering on upper thigh without surrounding inflammation. Right leg not examined without dressing.  SKIN:  No acute rashes.   NEUROLOGIC:  Grossly nonfocal.      Cultures   3/12 blood cultures x 2: No growth to date    No results found for the last 90 days.       Laboratory results     Recent Labs   Lab 03/19/25  0654 03/18/25  1708 03/18/25  0812 03/18/25  0658   WBC 14.1*  --  14.0* 14.2*   HGB 7.4* 7.5* 6.3* 6.2*   *  --  444 450       Recent Labs   Lab 03/18/25  0658 03/13/25  0814 03/12/25  1600   NA  --  136 138   CO2  --  22 23   BUN  --  37.3* 49.7*   ALBUMIN 2.2* 2.4* 2.6*   ALKPHOS 635* 351* 300*   ALT 42 26 27   AST 35 21 15       Recent Labs   Lab 03/18/25  0658 03/17/25  0626 03/15/25  0633   CRPI 191.10* 206.50* 299.40*           Imaging   Radiology results reviewed    US Lower Extremity Venous Duplex Bilateral    Result Date: 3/12/2025  EXAM: US LOWER EXTREMITY VENOUS DUPLEX BILATERAL LOCATION: [facility] DATE: 3/12/2025 INDICATION: redness, swelling COMPARISON: None. TECHNIQUE: Venous Duplex ultrasound of bilateral lower extremities with and without compression, augmentation and duplex. Color flow and spectral Doppler with waveform analysis performed. FINDINGS: Exam includes the common femoral, femoral, popliteal veins as well as segmentally visualized deep calf veins and greater saphenous vein. RIGHT: No deep vein thrombosis. No superficial thrombophlebitis. No popliteal cyst. LEFT: No deep vein thrombosis. No superficial thrombophlebitis. No popliteal cyst.     IMPRESSION: 1.  No deep venous thrombosis in the bilateral lower extremities. 2.  Suboptimal exam due to patient body habitus and edema. Patient declined unwrapping of the calves due to open wounds. Calf vessels only  partially visualized but negative for thrombus were seen.      Data reviewed today: I reviewed all medications, new labs and imaging results over the last 24 hours. I personally reviewed no images or EKG's today.  The patient's care was discussed with the Patient

## 2025-03-19 NOTE — PLAN OF CARE
Goal Outcome Evaluation:       Patient's pain is managed with ordered pain medication.  Tolerating a regular diet.  Up to chair with PT.  Bed alarm in place, call light within reach.  Continue IV antibiotics.

## 2025-03-20 ENCOUNTER — MEDICAL CORRESPONDENCE (OUTPATIENT)
Dept: HEALTH INFORMATION MANAGEMENT | Facility: CLINIC | Age: 80
End: 2025-03-20
Payer: COMMERCIAL

## 2025-03-20 LAB
BLD PROD TYP BPU: NORMAL
BLOOD COMPONENT TYPE: NORMAL
CODING SYSTEM: NORMAL
CREAT SERPL-MCNC: 0.95 MG/DL (ref 0.51–0.95)
CROSSMATCH: NORMAL
CRP SERPL-MCNC: 153.1 MG/L
EGFRCR SERPLBLD CKD-EPI 2021: 61 ML/MIN/1.73M2
ERYTHROCYTE [DISTWIDTH] IN BLOOD BY AUTOMATED COUNT: 17.3 % (ref 10–15)
EST. AVERAGE GLUCOSE BLD GHB EST-MCNC: 126 MG/DL
GLUCOSE BLDC GLUCOMTR-MCNC: 126 MG/DL (ref 70–99)
HBA1C MFR BLD: 6 %
HCT VFR BLD AUTO: 21.4 % (ref 35–47)
HGB BLD-MCNC: 6.7 G/DL (ref 11.7–15.7)
ISSUE DATE AND TIME: NORMAL
MCH RBC QN AUTO: 25.1 PG (ref 26.5–33)
MCHC RBC AUTO-ENTMCNC: 31.3 G/DL (ref 31.5–36.5)
MCV RBC AUTO: 80 FL (ref 78–100)
PLATELET # BLD AUTO: 492 10E3/UL (ref 150–450)
RBC # BLD AUTO: 2.67 10E6/UL (ref 3.8–5.2)
UNIT ABO/RH: NORMAL
UNIT NUMBER: NORMAL
UNIT STATUS: NORMAL
UNIT TYPE ISBT: 6200
WBC # BLD AUTO: 12.1 10E3/UL (ref 4–11)

## 2025-03-20 PROCEDURE — 250N000013 HC RX MED GY IP 250 OP 250 PS 637: Performed by: INTERNAL MEDICINE

## 2025-03-20 PROCEDURE — 99232 SBSQ HOSP IP/OBS MODERATE 35: CPT | Performed by: INTERNAL MEDICINE

## 2025-03-20 PROCEDURE — 250N000011 HC RX IP 250 OP 636: Performed by: INTERNAL MEDICINE

## 2025-03-20 PROCEDURE — 258N000003 HC RX IP 258 OP 636: Performed by: INTERNAL MEDICINE

## 2025-03-20 PROCEDURE — 250N000013 HC RX MED GY IP 250 OP 250 PS 637: Performed by: NURSE PRACTITIONER

## 2025-03-20 PROCEDURE — 250N000009 HC RX 250: Performed by: INTERNAL MEDICINE

## 2025-03-20 PROCEDURE — 999N000197 HC STATISTIC WOC PT EDUCATION, 0-15 MIN

## 2025-03-20 PROCEDURE — 36415 COLL VENOUS BLD VENIPUNCTURE: CPT | Performed by: INTERNAL MEDICINE

## 2025-03-20 PROCEDURE — 99232 SBSQ HOSP IP/OBS MODERATE 35: CPT | Performed by: NURSE PRACTITIONER

## 2025-03-20 PROCEDURE — 36569 INSJ PICC 5 YR+ W/O IMAGING: CPT

## 2025-03-20 PROCEDURE — 99233 SBSQ HOSP IP/OBS HIGH 50: CPT | Performed by: INTERNAL MEDICINE

## 2025-03-20 PROCEDURE — 83036 HEMOGLOBIN GLYCOSYLATED A1C: CPT | Performed by: INTERNAL MEDICINE

## 2025-03-20 PROCEDURE — 250N000011 HC RX IP 250 OP 636: Mod: JZ | Performed by: NURSE PRACTITIONER

## 2025-03-20 PROCEDURE — 272N000451 HC KIT SHRLOCK 5FR POWER PICC DOUBLE LUMEN

## 2025-03-20 PROCEDURE — 250N000013 HC RX MED GY IP 250 OP 250 PS 637

## 2025-03-20 PROCEDURE — 86140 C-REACTIVE PROTEIN: CPT | Performed by: INTERNAL MEDICINE

## 2025-03-20 PROCEDURE — P9016 RBC LEUKOCYTES REDUCED: HCPCS | Performed by: INTERNAL MEDICINE

## 2025-03-20 PROCEDURE — 120N000001 HC R&B MED SURG/OB

## 2025-03-20 PROCEDURE — 272N000278 HC DEVICE 5FR SECURACATH

## 2025-03-20 PROCEDURE — 250N000013 HC RX MED GY IP 250 OP 250 PS 637: Performed by: PHYSICIAN ASSISTANT

## 2025-03-20 PROCEDURE — 85027 COMPLETE CBC AUTOMATED: CPT | Performed by: INTERNAL MEDICINE

## 2025-03-20 PROCEDURE — 82565 ASSAY OF CREATININE: CPT | Performed by: INTERNAL MEDICINE

## 2025-03-20 RX ORDER — HYDROMORPHONE HYDROCHLORIDE 1 MG/ML
0.5 INJECTION, SOLUTION INTRAMUSCULAR; INTRAVENOUS; SUBCUTANEOUS EVERY 8 HOURS PRN
Status: DISCONTINUED | OUTPATIENT
Start: 2025-03-20 | End: 2025-03-25 | Stop reason: HOSPADM

## 2025-03-20 RX ORDER — LIDOCAINE 40 MG/G
CREAM TOPICAL
Status: ACTIVE | OUTPATIENT
Start: 2025-03-20 | End: 2025-03-23

## 2025-03-20 RX ADMIN — PANTOPRAZOLE SODIUM 40 MG: 40 TABLET, DELAYED RELEASE ORAL at 06:40

## 2025-03-20 RX ADMIN — SIMVASTATIN 40 MG: 40 TABLET, FILM COATED ORAL at 21:32

## 2025-03-20 RX ADMIN — ACETAMINOPHEN 650 MG: 325 TABLET ORAL at 00:18

## 2025-03-20 RX ADMIN — HYDROMORPHONE HYDROCHLORIDE 2 MG: 2 TABLET ORAL at 19:50

## 2025-03-20 RX ADMIN — ANORECTAL OINTMENT: 15.7; .44; 24; 20.6 OINTMENT TOPICAL at 21:32

## 2025-03-20 RX ADMIN — PANTOPRAZOLE SODIUM 40 MG: 40 TABLET, DELAYED RELEASE ORAL at 16:26

## 2025-03-20 RX ADMIN — FUROSEMIDE 20 MG: 20 TABLET ORAL at 16:27

## 2025-03-20 RX ADMIN — ROPINIROLE HYDROCHLORIDE 0.5 MG: 0.25 TABLET, FILM COATED ORAL at 08:27

## 2025-03-20 RX ADMIN — CITALOPRAM HYDROBROMIDE 40 MG: 40 TABLET, FILM COATED ORAL at 13:11

## 2025-03-20 RX ADMIN — ACETAMINOPHEN 650 MG: 325 TABLET ORAL at 17:38

## 2025-03-20 RX ADMIN — FERROUS SULFATE TAB 325 MG (65 MG ELEMENTAL FE) 325 MG: 325 (65 FE) TAB at 08:27

## 2025-03-20 RX ADMIN — CEFTRIAXONE SODIUM 2 G: 2 INJECTION, POWDER, FOR SOLUTION INTRAMUSCULAR; INTRAVENOUS at 16:34

## 2025-03-20 RX ADMIN — FUROSEMIDE 20 MG: 20 TABLET ORAL at 08:28

## 2025-03-20 RX ADMIN — ROPINIROLE HYDROCHLORIDE 1 MG: 1 TABLET, FILM COATED ORAL at 21:32

## 2025-03-20 RX ADMIN — ANORECTAL OINTMENT: 15.7; .44; 24; 20.6 OINTMENT TOPICAL at 08:29

## 2025-03-20 RX ADMIN — MICONAZOLE NITRATE ANTIFUNGAL POWDER: 2 POWDER TOPICAL at 21:32

## 2025-03-20 RX ADMIN — ACETAMINOPHEN 650 MG: 325 TABLET ORAL at 08:28

## 2025-03-20 RX ADMIN — LIDOCAINE HYDROCHLORIDE 2 ML: 10 INJECTION, SOLUTION EPIDURAL; INFILTRATION; INTRACAUDAL; PERINEURAL at 10:50

## 2025-03-20 RX ADMIN — ROPINIROLE HYDROCHLORIDE 0.5 MG: 0.25 TABLET, FILM COATED ORAL at 16:27

## 2025-03-20 RX ADMIN — HYDROMORPHONE HYDROCHLORIDE 0.5 MG: 1 INJECTION, SOLUTION INTRAMUSCULAR; INTRAVENOUS; SUBCUTANEOUS at 14:12

## 2025-03-20 RX ADMIN — SODIUM CHLORIDE 750 MG: 0.9 INJECTION, SOLUTION INTRAVENOUS at 19:53

## 2025-03-20 RX ADMIN — SPIRONOLACTONE 25 MG: 25 TABLET, FILM COATED ORAL at 08:29

## 2025-03-20 RX ADMIN — ACETAMINOPHEN 650 MG: 325 TABLET ORAL at 23:46

## 2025-03-20 RX ADMIN — ACETAMINOPHEN 650 MG: 325 TABLET ORAL at 04:37

## 2025-03-20 RX ADMIN — GABAPENTIN 200 MG: 100 CAPSULE ORAL at 21:31

## 2025-03-20 RX ADMIN — MICONAZOLE NITRATE ANTIFUNGAL POWDER: 2 POWDER TOPICAL at 08:29

## 2025-03-20 RX ADMIN — HYDROMORPHONE HYDROCHLORIDE 2 MG: 2 TABLET ORAL at 23:46

## 2025-03-20 RX ADMIN — BUPROPION HYDROCHLORIDE 150 MG: 150 TABLET, FILM COATED, EXTENDED RELEASE ORAL at 08:28

## 2025-03-20 RX ADMIN — Medication 60 ML: at 08:29

## 2025-03-20 RX ADMIN — ACETAMINOPHEN 650 MG: 325 TABLET ORAL at 13:11

## 2025-03-20 RX ADMIN — SODIUM CHLORIDE 750 MG: 0.9 INJECTION, SOLUTION INTRAVENOUS at 10:53

## 2025-03-20 RX ADMIN — GABAPENTIN 200 MG: 100 CAPSULE ORAL at 08:28

## 2025-03-20 RX ADMIN — SENNOSIDES AND DOCUSATE SODIUM 1 TABLET: 50; 8.6 TABLET ORAL at 19:50

## 2025-03-20 ASSESSMENT — ACTIVITIES OF DAILY LIVING (ADL)
ADLS_ACUITY_SCORE: 48
ADLS_ACUITY_SCORE: 52
ADLS_ACUITY_SCORE: 53
ADLS_ACUITY_SCORE: 48
ADLS_ACUITY_SCORE: 49
ADLS_ACUITY_SCORE: 53
ADLS_ACUITY_SCORE: 52
ADLS_ACUITY_SCORE: 50
ADLS_ACUITY_SCORE: 52
ADLS_ACUITY_SCORE: 49
ADLS_ACUITY_SCORE: 50
ADLS_ACUITY_SCORE: 53
ADLS_ACUITY_SCORE: 50
ADLS_ACUITY_SCORE: 50
ADLS_ACUITY_SCORE: 48
ADLS_ACUITY_SCORE: 50
ADLS_ACUITY_SCORE: 49
ADLS_ACUITY_SCORE: 52
ADLS_ACUITY_SCORE: 50
ADLS_ACUITY_SCORE: 50
ADLS_ACUITY_SCORE: 48
ADLS_ACUITY_SCORE: 53
ADLS_ACUITY_SCORE: 52

## 2025-03-20 NOTE — PROGRESS NOTES
"Care Management Follow Up    Length of Stay (days): 8    Expected Discharge Date: 03/21/2025    Anticipated Discharge Plan:  Transitional Care    Transportation: Anticipate Family/friend    PT Recommendations: Transitional Care Facility, Per plan established by the PT  OT Recommendations:  Transitional Care Facility     Barriers to Discharge: medical stability    Prior Living Situation: house (\"It is a very small house. There are a few steps to get inside the house. Then inside it is all 1 level.\") with significant other    Discussed  Partnership in Safe Discharge Planning  document with patient/family: No     Handoff Completed: Yes, [facility] PCP: Internal handoff referral completed    Patient/Spokesperson Updated: No    Additional Information: Chart reviewed and the plan for the patient is discharge home with home care services. POP met with the patient to further discuss discharge and discussed with her HC vs TCU placement. The patient notes that she has been to TCU twice and her experience has not been positive. She feels that going home is a better option as her house is one level, small, and equipped with hand grabs. She adds that her partner is available to help her and she has a daughter in town who provides some support. She prefers going home with home care and family support. SW encouraged her to let us know if she reconsiders TCU placement. She was notified that going home could put her at a high risk for falls and was encouraged to reconsider TCU placement. The patient states that her family will provide transportation upon discharge.     Discharge Plan  Destination: home   Transportation: Family/if the family cannot provide a ride will need medical ride via wheelchair.   Services: Home Care - Nivia RN/PT/OT/HHA     Next Steps: POP will continue to follow and assist with discharge planning as needed.    MANJULA Giron     "

## 2025-03-20 NOTE — PROGRESS NOTES
"GI PROGRESS NOTE  3/20/2025  Elizabeth Kelley  1945  /-90    Subjective:  She is tired today and is getting blood.  No abdominal pain.  She's having \"hard brown\" stool without melena or hematochezia.  No nausea/vomiting.     Objective:    Patient Vitals for the past 24 hrs:   BP Temp Temp src Pulse Resp SpO2   03/20/25 1117 109/76 97.8  F (36.6  C) -- 72 20 96 %   03/20/25 1102 109/51 98  F (36.7  C) -- 74 18 93 %   03/20/25 0725 127/59 98.1  F (36.7  C) Oral 79 20 92 %   03/20/25 0344 123/55 98.5  F (36.9  C) Oral 78 20 93 %   03/19/25 2320 119/55 98.6  F (37  C) Oral 76 20 92 %   03/19/25 1525 131/57 98.1  F (36.7  C) Oral 72 20 93 %     Body mass index is 49.94 kg/m .  Gen: NAD  GI: Non-distended, BS positive, soft, non-tender    Laboratory  Recent Labs   Lab Test 03/20/25  0640 03/19/25  0654 03/18/25  1708 03/18/25  0812 03/13/25  0814 03/12/25  1600   WBC 12.1* 14.1*  --  14.0*   < > 19.3*   HGB 6.7* 7.4* 7.5* 6.3*   < > 7.9*   MCV 80 80  --  79   < > 78   * 499*  --  444   < > 337   INR  --   --   --   --   --  1.11    < > = values in this interval not displayed.     Recent Labs   Lab Test 03/20/25  0640 03/19/25  0654 03/18/25  0658 03/14/25  0611 03/13/25  0814 03/12/25  1600 01/16/25  0657 01/15/25  0426   NA  --   --   --   --  136 138  --  137   POTASSIUM  --   --   --   --  4.1 4.1  --  4.2   CHLORIDE  --   --   --   --  104 105  --  104   CO2  --   --   --   --  22 23  --  25   BUN  --   --   --   --  37.3* 49.7*  --  14.2   CR 0.95 1.03* 1.01*   < > 1.29* 1.62*   < > 0.94   ANIONGAP  --   --   --   --  10 10  --  8   JUNI  --   --   --   --  8.6* 9.3  --  8.7*   GLC  --   --   --   --  100* 116*  --  96    < > = values in this interval not displayed.     Recent Labs   Lab Test 03/18/25  0658 03/13/25  0814 03/12/25  1600 01/12/25  1412 07/17/24  0705 07/16/24  1404 05/25/22  1637 04/09/22  1512   ALBUMIN 2.2* 2.4* 2.6*  --    < >  --    < >  --    BILITOTAL 0.2 0.2 <0.2  --    < >  " --    < >  --    ALT 42 26 27  --    < >  --    < >  --    AST 35 21 15  --    < >  --    < >  --    ALKPHOS 635* 351* 300*  --    < >  --    < >  --    PROTEIN  --   --   --  20*  --  Negative  --  10*    < > = values in this interval not displayed.     Normal folate and B12  Fe 11 low, iron sat 6% low,  low  Haptoglobin 425 high    Assessment:  She is a 79 year old female with a past medical history of obesity, hyperlipidemia, GERD, admitted with lower extremity cellulitis on antibiotics.  We are seeing her for normocytic anemia, and she reports having black stools at home more than a month ago. She was taking Aleve prior to admission, and she did have a gastric ulcer on EGD in 2015.  Last colonoscopy 9/2022 was unremarkable.  Anemia is likely multifactorial related to chronic disease, cellulitis, and possibly GI bleeding.  She is not overtly bleeding at this time though hgb dropped slightly following transfusion.  We'll make her NPO at midnight for possible EGD tomorrow.    Patient Active Problem List   Diagnosis    Venous hypertension of lower extremity, bilateral    Acquired lymphedema of leg    Scar condition and fibrosis of skin    Ankle contracture, left    Morbid obesity with BMI of 50.0-59.9, adult (H)    Valgus deformity of both feet    Flat feet, bilateral    Gait abnormality    MS (multiple sclerosis) (H)    Depression    Vitamin D deficiency    GERD (gastroesophageal reflux disease)    Essential hypertension    History of malignant melanoma of skin    Edema    Major depression, single episode, in complete remission    Menopausal and postmenopausal disorder    Mixed hyperlipidemia    Morbid obesity (H)    Peripheral venous insufficiency    Postmenopausal    Restless legs    Chronic pain    Ulcer of right lower extremity with fat layer exposed (H)    Pain associated with wound    Impaired glucose tolerance    Iron deficiency anemia    Snoring    Sepsis (H)    Cellulitis    Hypoxia    Elevated  troponin    Chronic ulcer of right leg (H)    Cellulitis of right lower leg    Acute sepsis (H)    Peripheral edema    Cellulitis of left lower extremity    KIKE (acute kidney injury)        Plan:    1. Continue PPI twice daily.  2.  NPO at midnight for possible EGD tomorrow.  3.  GI following.    15 minutes of total time was spent providing patient care, including patient evaluation, reviewing documentation/test results and .                                                Luh Morrison PA-C  Thank you for the opportunity to participate in the care of this patient.   Please feel free to call me with any questions or concerns.  Phone number [phone].

## 2025-03-20 NOTE — PROGRESS NOTES
[facility]  WO Nurse Inpatient Assessment     Consulted for: right and lower extremities    Summary: 3/20 WOC checked in with patient.  Patient stated leg pain decreased over the past couple of days and she seems to be happy with new type of dressing.  The new dressing lasted almost 48 hours without strike through and patient was happy about not having to do daily changes. She is able to sleep, eat and function without the pain being overwhelming, she knows to expect some pain given the wseverity of wound but currently more tolerable than the past few days.  WOC team will again follow up with her next week to see status of wound and how the dressings are doing.    Vinicio Shane, ALBARON RN CWOCN  Pager no longer in use, please contact through [email or web address] group: [facility]       3/18: patient was evaluated in the am, wound care materials were ordered, woc team went back in the afternoon to complete cares when products arrived.     3/13 Patient had left leg wrapped by Lymphedema team prior to [facility] arrival.  Patient known to WOC team, notes from previous admissions available in Epic.  Sees home care x3 a week and does wound care independently on other days.  Right leg had signs of pseudomonas including green drainage and sweet, yeast like odor.  Vashe wound cleanser should get rid of pseudomonas.    WO nurse follow-up plan: weekly    Patient History (according to provider note(s):       Elizabeth Kelley is a 79 year old female with PMHx of recurrent right lower extremity cellulitis, chronic lower extremity lymphedema, chronic venous stasis, chronic anemia, RLS, HLD, GERD who was admitted on 3/12/2025. She has a history of recurrent hospitalizations for right lower extremity cellulitis. Patient recently admitted 1/12 - 1/18/2025 for RIGHT lower extremity cellulitis and sepsis.  Had been treated with meropenem and vancomycin, ID was consulted and she was discharged on doxycycline.  She  "presented to the ER today for evaluation of LEFT lower extremity erythema, edema, pain and admitted for left lower extremity cellulitis.     Assessment:      Areas visualized during today's visit: Lower extremities     Skin Injury Location: right leg            Green drainage                                               front                                                                 lateral                                                               medial          3/18    Last photo: 3/18/2025  Skin injury due to: Unclear etiology and cellulitis  Skin history and plan of care:   see chart  Affected area:      Skin assessment: Denudement, Edema, and Maceration     Measurements (length x width x depth, in cm) circumference of leg from knee to ankle     Color: normal and consistent with surrounding tissue     Temperature  normal      Drainage: copious .      Color: creamy      Odor: strong and sweet  Pain: moderate, sharp  Pain interventions prior to dressing change: slow and gentle cares   Treatment goal: Drainage control, Decrease moisture, and Protection  STATUS: evolving  Supplies ordered: ordered polymem from [facility]     Patient was seen this am with both nurse and MD at bedside to reeval. Oil emulsion dressings were used instead of the xeroform that was ordered and patient had stated that they were scrubbed off this morning. Patient was placed in a soaking vashe wrap and supplies were ordered from [facility], [facility] nurse returned and placed new dressing.    Treatment Plan:     Right leg wound(s): Every other day can do wound cares on Lymphedema schedule if wrapping leg  Moisten 4\" roll of Kerlix with Vashe, wring out excess.  Wrap leg from knee to ankle and let soak for 15-20 minutes  Cover wound with Polymem roll (#[id number]) - WRITING AWAY FROM WOUND, secure with kerlix roll  Cover Calf with 2 large Mextra under knee with medium size to fill in gap and two medium around ankle.  Secure with 2 rolls of dry 4\" Kerlix " from toes to knees, place pillow under calf and protect pillow with Chux     Call stores for Mextra if out Large PS# [id number], medium PS# [id number]      Orders: Written    RECOMMEND PRIMARY TEAM ORDER: None, at this time  Education provided: plan of care  Discussed plan of care with: Patient and Nurse  Notify [facility] if wound(s) deteriorate.  Nursing to notify the Provider(s) and re-consult the WO Nurse if new skin concern.    DATA:     Current support surface: Standard  Standard gel mattress (Isoflex)  Containment of urine/stool: Continent of bladder and Continent of bowel  BMI: Body mass index is 49.94 kg/m .   Active diet order: Orders Placed This Encounter      Combination Diet Regular Diet Adult     Output: I/O last 3 completed shifts:  In: 610 [P.O.:610]  Out: 1150 [Urine:1150]     Labs:   Recent Labs   Lab 03/20/25  0640 03/18/25  0812 03/18/25  0658   ALBUMIN  --   --  2.2*   HGB 6.7*   < > 6.2*   WBC 12.1*   < > 14.2*    < > = values in this interval not displayed.     Pressure injury risk assessment:   Sensory Perception: 3-->slightly limited  Moisture: 2-->very moist  Activity: 3-->walks occasionally  Mobility: 3-->slightly limited  Nutrition: 3-->adequate  Friction and Shear: 2-->potential problem  Ajith Score: 16      Pager no longer in use, please contact through [employer] group: [facility]

## 2025-03-20 NOTE — PROGRESS NOTES
Infectious Diseases Progress Note  [facility]    Date of visit: 03/20/2025     ASSESSMENT   79-year-old woman with a history of chronic lymphedema, hyperlipidemia, GERD who was admitted for left leg cellulitis.    Left leg cellulitis.  Presenting with fevers then redness and swelling in the entire left leg up into the buttock.  No open lesions or fluctuant areas.  Limited left lower extremity ultrasound without DVT.  Presenting with leukocytosis and elevated inflammatory markers. Wbc and CRP slow improvement. CT left thigh/pelvis showing possible myositis/fasciitis, no abscess. Improving with antibiotics. Wbc improving   Chronic stasis dermatitis, right leg.  History of recurrent infections in the right leg due to open lesions.  Recently placed on minocycline for lifelong suppression.  (50 mg daily)  Anemia. Needing transfusions. Seeing GI, no obvious source of blood loss.      Principal Problem:    Cellulitis of left lower extremity  Active Problems:    Acquired lymphedema of leg    Morbid obesity (H)    Peripheral edema    KIKE (acute kidney injury)       PLAN   -continue vancomycin and ceftriaxone   -continues to slowly improve; anticipate po antibiotics in 2-3 days  -trend CRP, WBC      Son Rodriguez MD  [facility]  Direct messaging: [employer] Paging   On-Call ID provider: [phone], option: 9         ===========================================      SUBJECTIVE / INTERVAL HISTORY:     Transfusion today. Left leg feeling better      Antibiotics   Vancomycin 3/12-  Ceftriaxone 3/17-    Previous:  Zosyn 3/12-3/17  Minocycline prior to arrival      Physical Exam     Temp:  [97.8  F (36.6  C)-98.9  F (37.2  C)] 98.4  F (36.9  C)  Pulse:  [72-79] 74  Resp:  [2-20] 20  BP: (108-131)/(51-76) 108/54  SpO2:  [88 %-96 %] 94 %    /54   Pulse 74   Temp 98.4  F (36.9  C) (Oral)   Resp 20   Ht 1.524 m (5')   Wt 116 kg (255 lb 11.7 oz)   SpO2 94%   BMI 49.94 kg/m      GENERAL:   well-developed, well-nourished, lying in bed in no acute distress.   HENT:  Head is normocephalic, atraumatic.   EYES:  Eyes have anicteric sclerae without conjunctival injection   LUNGS:  normal respiratory pattern   EXT: improving erythema on left leg. Right leg not examined without dressing.  SKIN:  No acute rashes.   NEUROLOGIC:  Grossly nonfocal.      Cultures   3/12 blood cultures x 2: No growth to date    No results found for the last 90 days.       Laboratory results     Recent Labs   Lab 03/20/25  0640 03/19/25  0654 03/18/25  1708 03/18/25  0812   WBC 12.1* 14.1*  --  14.0*   HGB 6.7* 7.4* 7.5* 6.3*   * 499*  --  444       Recent Labs   Lab 03/18/25  0658   ALBUMIN 2.2*   ALKPHOS 635*   ALT 42   AST 35       Recent Labs   Lab 03/20/25  0640 03/18/25  0658 03/17/25  0626   CRPI 153.10* 191.10* 206.50*           Imaging   Radiology results reviewed    US Lower Extremity Venous Duplex Bilateral    Result Date: 3/12/2025  EXAM: US LOWER EXTREMITY VENOUS DUPLEX BILATERAL LOCATION: [facility] DATE: 3/12/2025 INDICATION: redness, swelling COMPARISON: None. TECHNIQUE: Venous Duplex ultrasound of bilateral lower extremities with and without compression, augmentation and duplex. Color flow and spectral Doppler with waveform analysis performed. FINDINGS: Exam includes the common femoral, femoral, popliteal veins as well as segmentally visualized deep calf veins and greater saphenous vein. RIGHT: No deep vein thrombosis. No superficial thrombophlebitis. No popliteal cyst. LEFT: No deep vein thrombosis. No superficial thrombophlebitis. No popliteal cyst.     IMPRESSION: 1.  No deep venous thrombosis in the bilateral lower extremities. 2.  Suboptimal exam due to patient body habitus and edema. Patient declined unwrapping of the calves due to open wounds. Calf vessels only partially visualized but negative for thrombus were seen.      Data reviewed today: I reviewed all medications, new  labs and imaging results over the last 24 hours. I personally reviewed no images or EKG's today.  The patient's care was discussed with the Patient

## 2025-03-20 NOTE — PLAN OF CARE
Problem: Adult Inpatient Plan of Care  Goal: Optimal Comfort and Wellbeing  Outcome: Progressing  Intervention: Monitor Pain and Promote Comfort  Recent Flowsheet Documentation  Taken 3/20/2025 7359 by Suzi Germain RN  Pain Management Interventions:   medication (see MAR)   repositioned   rest   emotional support     Problem: Delirium  Goal: Improved Sleep  Outcome: Progressing   Goal Outcome Evaluation:  A&Ox4. Complained of BLE pain relieved with scheduled tylenol and rest. Purewick in place. VSS on room air. Slept well overnight between cares.

## 2025-03-20 NOTE — PROCEDURES
"PICC Line Insertion Procedure Note  Pt. Name: Elizabeth Kelley  MRN:        [id number]    Procedure: Insertion of a  triple Lumen  5 fr  Bard SOLO (valved) Power PICC, Lot number [id number]    Indications: Vascular Access    Contraindications : none    Procedure Details     Patient identified with 2 identifiers and \"Time Out\" conducted.  .     Central line insertion bundle followed: hand hygiene performed prior to procedure, site cleansed with cholraprep, hat, mask, sterile gloves, sterile gown worn, patient draped with maximum barrier head to toe drape, sterile field maintained.    The vein was assessed and found to be compressible and of adequate size.     Lidocaine 1% 2 ml administered sq to the insertion site. A 5 Fr PICC was inserted into the cephalic vein of the left arm with ultrasound guidance. 1 attempt(s) required to access vein.   Catheter threaded without difficulty. Good blood return noted.    Modified Seldinger Technique used for insertion.    The 8 sharps that are included in the PICC insertion kit were accounted for and disposed of in the sharps container prior to breakdown of the sterile field.    Catheter secured with Securecath, biopatch and Tegaderm dressing applied.    Findings:    Total catheter length  50 cm, with 4 cm exposed. Mid upper arm circumference is 40 cm. Catheter was flushed with 30 cc NS. Patient  tolerated procedure well.    Tip placement verified by 3CG technology. Tip placement in the SVC/RA junction.    CLABSI prevention brochure left at bedside.    Patient's primary RN notified PICC is ready for use.      Comments:    This patient's catheter is secured with SecurAcath instead of a traditional suture or adhesive based securement. This is a subcutaneous anchor securement system (aka TONNY or SecurAcath) that holds the catheter just below the skin with nitinol feet and a friction fit  around the catheter.  It can stay with the catheter until the catheter is removed.    Do not " "open, change or remove the SecurAcath.  SecurAcath may be disinfected during a dressing change, but do not open or remove it.  SecurAcath acts like a hinge - lift, clean 360 degrees around, let dry and apply new dressing.  SecurAcath is compatible with all dressings and antiseptic agents.  Ensure the wings of the catheter and SecurAcath are completely under the boarder of the dressing.  SecurAcath is MRI safe to 3T, see IFU for details.  A Statlock is not necessary when SecurAcath is present.   Patients can go to MRI with SecurAcath device in place  When the catheter is indicated for removal, enter \"Nursing to Consult for Vascular Access Care\" order in [employer], watch the removal video on [employer], or call for training if you have not removed before. 24 hour SecurAcath Clinical Education and Support  [phone]               Sissy Dockery PICC RN  Vascular Access - [facility]        "

## 2025-03-20 NOTE — PLAN OF CARE
Goal Outcome Evaluation:       Dressings on BLE CDI. Pain controlled with scheduled acetaminophen. She requested PRN hydromorphone at HS. VSS.Call light in reach.

## 2025-03-20 NOTE — PROGRESS NOTES
[facility] ACUTE PAIN SERVICE    [facility], [facility], [facility], [facility], [place]   PAIN Progress Note    Assessment/Plan:  Elizabeth Kelley is a 79 year old female who was admitted on 3/12/2025.  Pain team was asked to see the patient for lower extremity wound pain. Admitted for fevers, redness, swelling in the left leg up to the left buttock. History of recurrent lower extremity cellulitis, chronic lymphedema, hyperlipidemia, GERD, acute on chronic anemia, chronic venous stasis, restless leg syndrome, anxiety, depression, obesity.      Pain well controlled. Pain team will sign off. Discussed with Hospital Medicine Service      PLAN:   1) Pain is consistent with cellulitis of the left lower extremity in the setting of recurrent right lower extremity cellulitis, chronic bilateral lower extremity lymphedema, chronic venous stasis, KIKE.  Patient does have a history of recurrent hospitalizations for right lower extremity cellulitis.  Patient was recently admitted 1/12/2025 through 1/18/2025 for right lower extremity cellulitis and sepsis and she had been treated with meropenem and vancomycin for which ID was consulted and she was discharged on doxycycline.  She presented to the emergency department for left lower extremity erythema, edema, pain and was admitted for left lower leg cellulitis.     Multimodal Medication Therapy  Topical: None  NSAID'S: Estimated Creatinine Clearance: 57.7 mL/min (based on SCr of 0.95 mg/dL). None, KIKE  Steroids: None  Muscle Relaxants: None  Adjuvants: Scheduled acetaminophen 650 mg every 4 hours, gabapentin 100 mg 3 times daily (home medication)-changed to 200 mg twice daily (CrCl greater than 30 to 59 mL/min, 400 to 1400 mg/day given in 2 divided doses)  Antidepressants/anxiolytics: Ropinirole 0.5 mg twice daily and 1 mg at bedtime, Celexa 40 mg daily with lunch, Wellbutrin 150 mg every morning  Opioids: Dilaudid 1-2 mg every 3 hours as needed  IV Pain medication: Dilaudid 0.5  mg every 8 hours as needed - will continue for prior to dressing changes    Non-medication interventions: Ice, Rest, PT, OT, Distraction (TV, Music, Reading), Meditation, and wound care  Constipation Prophylaxis: Senna docusate as needed, MiraLAX twice daily as needed     -Opioid prescriber has been none recent  -[place]  pulled from system on 3/19/2025. This indicates fills for gabapentin and 1 prescription for oxycodone 5 mg for 6 tablets filled in November 2024 otherwise no opioid use  12/23/2024 gabapentin 100 mg #270 for 90 days by Jensen Mcwilliams -same on 10/22/2024, 8/19/2024, 5/23/2024  11/15/2024 oxycodone 5 mg number 6 tablets for 2 days by Yung Herrmann    Discharge Recommendations - We recommend prescribing the following at the time of discharge: likely APAP, Gabapentin 200 mg bid, dilaudid     Subjective:  Describes pain as 2-4/10 and aching in the RLE. The patient denies nausea, vomiting, constipation, diarrhea, chest pain, shortness of breath, dizziness, fever, and chills. The patient reports her pain is well controlled.       Principal Problem:  Cellulitis of left lower extremity     Patient Active Problem List   Diagnosis    Venous hypertension of lower extremity, bilateral    Acquired lymphedema of leg    Scar condition and fibrosis of skin    Ankle contracture, left    Morbid obesity with BMI of 50.0-59.9, adult (H)    Valgus deformity of both feet    Flat feet, bilateral    Gait abnormality    MS (multiple sclerosis) (H)    Depression    Vitamin D deficiency    GERD (gastroesophageal reflux disease)    Essential hypertension    History of malignant melanoma of skin    Edema    Major depression, single episode, in complete remission    Menopausal and postmenopausal disorder    Mixed hyperlipidemia    Morbid obesity (H)    Peripheral venous insufficiency    Postmenopausal    Restless legs    Chronic pain    Ulcer of right lower extremity with fat layer exposed (H)    Pain associated with wound     Impaired glucose tolerance    Iron deficiency anemia    Snoring    Sepsis (H)    Cellulitis    Hypoxia    Elevated troponin    Chronic ulcer of right leg (H)    Cellulitis of right lower leg    Acute sepsis (H)    Peripheral edema    Cellulitis of left lower extremity    KIKE (acute kidney injury)        Objective:    History   Drug Use No          Tobacco Use      Smoking status: Former        Packs/day: 0.00        Years: 0.3 packs/day for 12.0 years (3.0 ttl pk-yrs)        Types: Cigarettes        Start date: 1963        Quit date: 1975        Years since quittin.2      Smokeless tobacco: Never      Tobacco comments: quit more than 30 years ago      Vital signs in last 24 hours:  /76   Pulse 72   Temp 97.8  F (36.6  C)   Resp 20   Ht 1.524 m (5')   Wt 116 kg (255 lb 11.7 oz)   SpO2 96%   BMI 49.94 kg/m      Weight:     Vitals:    25 1234 25 1452   Weight: 112.9 kg (249 lb) 116 kg (255 lb 11.7 oz)      Weight change:   Body mass index is 49.94 kg/m .    Intake/Output last 3 shifts:  I/O last 3 completed shifts:  In: 610 [P.O.:610]  Out: 1150 [Urine:1150]  Intake/Output this shift:  I/O this shift:  In: 480 [P.O.:480]  Out: 450 [Urine:450]    Review of Systems:   As per subjective, all others negative.    Physical Exam:  General Appearance:  Alert, cooperative, no distress, sitting up in bed eating breakfast   Head:  Normocephalic, without obvious abnormality, atraumatic   Eyes:  PERRL, conjunctiva/corneas clear, EOM's intact   Nose: Nares normal, septum midline   Throat: Lips, mucosa, and tongue normal; teeth and gums normal   Neck: Supple, symmetrical, trachea midline   Back:   Symmetric, no curvature, ROM normal   Lungs:   Clear to auscultation bilaterally, respirations unlabored   Chest Wall:  No tenderness or deformity   Heart:  Regular rate and rhythm, S1, S2 normal    Abdomen:   Soft, non-tender   Extremities: BLE covered    Skin: Visualized areas warm, dry, BLEs  covered    Neurologic: Alert and oriented X 3, Moves all 4 extremities   Psych: Affect is appropriate      Imaging: Reviewed I have personally reviewed pertinent notes, labs, tests, and radiologic imaging in patient's chart.  Labs: Reviewed I have personally reviewed pertinent notes, labs, tests, and radiologic imaging in patient's chart.  Notes: Reviewed I have personally reviewed pertinent notes, labs, tests, and radiologic imaging in patient's chart.    Total time spent 35 minutes with greater than 50% in consultation, education and coordination of care.   Also discussed with RN, ROSCOE.   Treatment plan includes: multimodal pain approach, Hospital Medicine Service for medical management, ID, WOC.   Patient educated regarding: multimodal pain approach and medications as listed above.   Elements of Medical Decision Making as described above. Acute or chronic illness or injury or surgery. High risk therapy including opioids, high risk drug therapy including oral and/or parenteral controlled substances.      Patient is understanding of the plan. All questions and concerns addressed to patient's satisfaction.     PETER ConnorP-GRETEL  Acute Care Inpatient Pain Management Program  [facility])  Hours of coverage Monday-Friday [id number]. After hours please contact Primary team.   Page via Epic chat or [employer]

## 2025-03-20 NOTE — PROGRESS NOTES
[facility]    Medicine Progress Note - Hospitalist Service    Date of Admission:  3/12/2025    Assessment & Plan   Elizabeth Kelley is a 79 year old female with history of recurrent right lower extremity cellulitis, chronic lower extremity lymphedema, and chronic venous stasis,  who is admitted for LEFT lower extremity cellulitis which is slow to improve.  Currently on IV vancomycin and ceftriaxone and ID anticipates p.o. antibiotics in 2 to 3 days.  Hospital course complicated by acute on chronic anemia, suspect multifactorial.  S/p 2 units PRBCs.  GI consulted, planning for EGD tomorrow to evaluate for upper GI source (h/o bleeding gastric ulcer).    Needs assist of 2 people however has been declining TCU    Left Lower Extremity Cellulitis  Presents with LEFT lower extremity erythema, edema.  Admitted 1/12 - 1/18/2025 for RIGHT lower extremity cellulitis and sepsis.  She was treated with meropenem and vancomycin, then transitioned to doxycycline.  3 weeks ago started suppression with minocycline 50 mg daily   Had elevated procalcitonin, CRP and leukocytosis.  -> 153  Ultrasound negative for DVT.  ABIs wnl   CT scan of the left thigh shows no abscess and no osteomyelitis.   -- On Vanco since 3/12 - present, Zosyn 3/12 - 3/17, ceftriaxone since 3/17- present   -- ID anticipates p.o. antibiotics in 2 to 3 days  -- WOC RN is following     Acute on chronic anemia.  Likely multifactorial. Some oozing from the RLE wounds but otherwise no obvious active bleeding.  Hb 6.2 from 7.9 on admission.  Has h/o upper GIB due to PUD, recent colonoscopy was unremarkable   --labs suggestive of DAYANARA, B12 and folate normal, no evidence of hemolysis  --transfused  2 units  PRBCs on 3/18 and 3/20   --Increased PPI to BID   -- EGD tomorrow   -- Resumed PTA iron    KIKE - improving, creatinine back to baseline   Cr 1.62, baseline appears around 1  -- Monitor     Chronic Bilateral Lower Extremity Lymphedema,  "Chronic Venous Stasis  -- Continue spironolactone/Lasix   -- PT for lymphedema wraps   -- WOC consulted    Generalized weakness  -- PT/OT are recommending TCU, patient has been declining so far    Chronic stable conditions:   Restless leg syndrome - continue PTA ropinirole  Anxiety depression - continue PTA Wellbutrin and Celexa  Hyperlipidemia - continue PTA statin   GERD - continue PTA PPI  Obesity: BMI 48.          Diet: Combination Diet Regular Diet Adult  NPO for Medical/Clinical Reasons Except for: No Exceptions    DVT Prophylaxis: Anti-embolisim stockings (TEDs), holding pharmacologic prophylaxis due to drop in hemoglobin  Amato Catheter: Not present  Lines: PRESENT      PICC 03/20/25 Double Lumen Left Basilic Vascular access-Site Assessment: WDL      Cardiac Monitoring: None  Code Status: Full Code      Clinically Significant Risk Factors               # Hypoalbuminemia: Lowest albumin = 2.2 g/dL at 3/18/2025  6:58 AM, will monitor as appropriate     # Hypertension: Noted on problem list            # Severe Obesity: Estimated body mass index is 49.94 kg/m  as calculated from the following:    Height as of this encounter: 1.524 m (5').    Weight as of this encounter: 116 kg (255 lb 11.7 oz).      # Financial/Environmental Concerns: none;other (see comments) (Significant other states, \"without me she would be living on the streets and be unable to afford food, but since I still work full time we do just fine. Her  left her and kicked her out of the house about 15 years ago\".)         Social Drivers of Health    Tobacco Use: Medium Risk (10/21/2024)    Patient History     Smoking Tobacco Use: Former     Smokeless Tobacco Use: Never          Disposition Plan     Medically Ready for Discharge: Anticipated in 2-4 Days             Christie Maldonado MD  Hospitalist Service  [facility]  Securely message with [employer] (more info)  Text page via [employer] Paging/Directory "   ______________________________________________________________________    Interval History   Leg pain is better today.   Having brown stool without melena or hematochezia.  No abdominal pain.  Felt tired this morning and could not do PT    Physical Exam   Vital Signs: Temp: 98.3  F (36.8  C) Temp src: Oral BP: 120/57 Pulse: 78   Resp: 20 SpO2: 93 % O2 Device: None (Room air) Oxygen Delivery: 2 LPM  Weight: 255 lbs 11.74 oz    General Appearance: No acute distress  Respiratory: Lungs are clear anteriorly  Cardiovascular: Regular rate and rhythm.  Normal S1-S2  GI: Abdomen soft nontender  Extremities: Bilateral lower extremity edema L>R, left lower extremity: Lower leg erythema and edema is improved from prior, some blistering along the lateral left hip and upper leg, R lower leg with multiple wounds   Other: Alert, grossly nonfocal    Medical Decision Making       50 MINUTES SPENT BY ME on the date of service doing chart review, history, exam, documentation & further activities per the note.  MANAGEMENT DISCUSSED with the following over the past 24 hours: Patient       Data     I have personally reviewed the following data over the past 24 hrs:    12.1 (H)  \   6.7 (LL)   / 492 (H)     N/A N/A N/A /  N/A   N/A N/A 0.95 \     Procal: N/A CRP: 153.10 (H) Lactic Acid: N/A         Imaging results reviewed over the past 24 hrs:   No results found for this or any previous visit (from the past 24 hours).

## 2025-03-20 NOTE — PLAN OF CARE
Goal Outcome Evaluation:      Plan of Care Reviewed With: patient    Overall Patient Progress: improvingOverall Patient Progress: improving           Problem: Adult Inpatient Plan of Care  Goal: Optimal Comfort and Wellbeing  Intervention: Monitor Pain and Promote Comfort  Recent Flowsheet Documentation  Taken 3/20/2025 0828 by Joan Dueñas RN  Pain Management Interventions: medication (see MAR)     Problem: Skin Injury Risk Increased  Goal: Skin Health and Integrity  Intervention: Plan: Nurse Driven Intervention: Moisture Management  Recent Flowsheet Documentation  Taken 3/20/2025 0732 by Joan Dueñas RN  Moisture Interventions:   Urinary collection device   Encourage regular toileting   No brief in bed   Barrier ointment (CriticAid, Triad paste)     Problem: Adult Inpatient Plan of Care  Goal: Plan of Care Review  Description: The Plan of Care Review/Shift note should be completed every shift.  The Outcome Evaluation is a brief statement about your assessment that the patient is improving, declining, or no change.  This information will be displayed automatically on your shift  note.  Outcome: Progressing  Flowsheets (Taken 3/20/2025 1113)  Plan of Care Reviewed With: patient  Overall Patient Progress: improving     Tearful this AM. Feels like she is getting worse and doesn't understand what's going on, Hgb 6.7. PRBC infused. IV painful and red this AM. MD notified PICC placed. Tylenol sched for pain and prn Dilaudid for dressing changes. Dressing change done as ordered. Hard stools. Stool softener given. Low air low mattress added to bed Turn and reposition every 2hrs

## 2025-03-20 NOTE — PROGRESS NOTES
SPIRITUAL HEALTH SERVICES NOTE  [facility]; P1    Reason for Visit: LOS    SPIRITUAL ASSESSMENT  Coping well  Names her Congregation karri as a source of strength and encouragement    Patient/Family Understanding of Illness and Goals of Care - Saw Carolina due to LOS. She engaged easily in conversation, sharing that she had cellulitis in one leg prior to this hospitalization, but that cellulitis in her left leg is something new. She is unclear how this happened. Additionally, she notes that her hemoglobin was low this morning and she required a blood transfusion. Carolina lives with MS and has a nurse come to the house to help her 3 times a week. She anticipates that she will discharge to home when appropriate.     Distress and Loss - Carolina shares that sometimes her health issues do lead to her feeling lonely, overwhelmed and discouraged. She is usually able to find ways to cope with this.     Strengths, Coping, and Resources - Carolina has two daughters who are sources of support. She also has a few good friends whom she can call for encouragement.     Meaning, Beliefs, and Spirituality - Carolina is Congregation. She identifies her conversations with God as a consistent source of encouragement, particularly when she feeling is sad and overwhelmed. She also has a few songs that she particularly appreciates (Josr and Natalya Did You Know among them). She is connected to [employer] in [place] and welcomes my offer to notify them of her admission. Prayer shared.     Plan of Care: Will remain available for further support as requested by patient/family/staff    Cindi Hernandez M.Div.    Office: [phone] (for non-urgent requests)  Please Vocera or page through [facility] for time-sensitive requests

## 2025-03-21 ENCOUNTER — APPOINTMENT (OUTPATIENT)
Dept: OCCUPATIONAL THERAPY | Facility: HOSPITAL | Age: 80
End: 2025-03-21
Payer: COMMERCIAL

## 2025-03-21 LAB
BASOPHILS # BLD AUTO: 0.1 10E3/UL (ref 0–0.2)
BASOPHILS NFR BLD AUTO: 1 %
CREAT SERPL-MCNC: 0.94 MG/DL (ref 0.51–0.95)
CRP SERPL-MCNC: 151.6 MG/L
EGFRCR SERPLBLD CKD-EPI 2021: 62 ML/MIN/1.73M2
EOSINOPHIL # BLD AUTO: 0.2 10E3/UL (ref 0–0.7)
EOSINOPHIL NFR BLD AUTO: 2 %
ERYTHROCYTE [DISTWIDTH] IN BLOOD BY AUTOMATED COUNT: 18 % (ref 10–15)
HCT VFR BLD AUTO: 25.4 % (ref 35–47)
HGB BLD-MCNC: 7.8 G/DL (ref 11.7–15.7)
IMM GRANULOCYTES # BLD: 0.3 10E3/UL
IMM GRANULOCYTES NFR BLD: 3 %
LYMPHOCYTES # BLD AUTO: 1.5 10E3/UL (ref 0.8–5.3)
LYMPHOCYTES NFR BLD AUTO: 12 %
MCH RBC QN AUTO: 25.3 PG (ref 26.5–33)
MCHC RBC AUTO-ENTMCNC: 30.7 G/DL (ref 31.5–36.5)
MCV RBC AUTO: 83 FL (ref 78–100)
MONOCYTES # BLD AUTO: 1.4 10E3/UL (ref 0–1.3)
MONOCYTES NFR BLD AUTO: 12 %
NEUTROPHILS # BLD AUTO: 8.4 10E3/UL (ref 1.6–8.3)
NEUTROPHILS NFR BLD AUTO: 70 %
NRBC # BLD AUTO: 0 10E3/UL
NRBC BLD AUTO-RTO: 0 /100
PLATELET # BLD AUTO: 572 10E3/UL (ref 150–450)
RBC # BLD AUTO: 3.08 10E6/UL (ref 3.8–5.2)
UPPER GI ENDOSCOPY: NORMAL
VANCOMYCIN SERPL-MCNC: 16.7 UG/ML
WBC # BLD AUTO: 12 10E3/UL (ref 4–11)

## 2025-03-21 PROCEDURE — 250N000013 HC RX MED GY IP 250 OP 250 PS 637: Performed by: NURSE PRACTITIONER

## 2025-03-21 PROCEDURE — 250N000013 HC RX MED GY IP 250 OP 250 PS 637: Performed by: PHYSICIAN ASSISTANT

## 2025-03-21 PROCEDURE — 120N000001 HC R&B MED SURG/OB

## 2025-03-21 PROCEDURE — 99232 SBSQ HOSP IP/OBS MODERATE 35: CPT | Performed by: STUDENT IN AN ORGANIZED HEALTH CARE EDUCATION/TRAINING PROGRAM

## 2025-03-21 PROCEDURE — 250N000011 HC RX IP 250 OP 636: Performed by: INTERNAL MEDICINE

## 2025-03-21 PROCEDURE — 370N000017 HC ANESTHESIA TECHNICAL FEE, PER MIN: Performed by: INTERNAL MEDICINE

## 2025-03-21 PROCEDURE — 250N000013 HC RX MED GY IP 250 OP 250 PS 637: Performed by: INTERNAL MEDICINE

## 2025-03-21 PROCEDURE — 360N000075 HC SURGERY LEVEL 2, PER MIN: Performed by: INTERNAL MEDICINE

## 2025-03-21 PROCEDURE — 85025 COMPLETE CBC W/AUTO DIFF WBC: CPT | Performed by: INTERNAL MEDICINE

## 2025-03-21 PROCEDURE — 99232 SBSQ HOSP IP/OBS MODERATE 35: CPT | Performed by: INTERNAL MEDICINE

## 2025-03-21 PROCEDURE — 97535 SELF CARE MNGMENT TRAINING: CPT | Mod: GO

## 2025-03-21 PROCEDURE — 258N000003 HC RX IP 258 OP 636: Performed by: INTERNAL MEDICINE

## 2025-03-21 PROCEDURE — 82565 ASSAY OF CREATININE: CPT | Performed by: INTERNAL MEDICINE

## 2025-03-21 PROCEDURE — 0DJ08ZZ INSPECTION OF UPPER INTESTINAL TRACT, VIA NATURAL OR ARTIFICIAL OPENING ENDOSCOPIC: ICD-10-PCS | Performed by: INTERNAL MEDICINE

## 2025-03-21 PROCEDURE — 80202 ASSAY OF VANCOMYCIN: CPT | Performed by: INTERNAL MEDICINE

## 2025-03-21 PROCEDURE — 999N000141 HC STATISTIC PRE-PROCEDURE NURSING ASSESSMENT: Performed by: INTERNAL MEDICINE

## 2025-03-21 PROCEDURE — 86140 C-REACTIVE PROTEIN: CPT | Performed by: INTERNAL MEDICINE

## 2025-03-21 PROCEDURE — 272N000001 HC OR GENERAL SUPPLY STERILE: Performed by: INTERNAL MEDICINE

## 2025-03-21 PROCEDURE — 710N000009 HC RECOVERY PHASE 1, LEVEL 1, PER MIN: Performed by: INTERNAL MEDICINE

## 2025-03-21 PROCEDURE — 250N000013 HC RX MED GY IP 250 OP 250 PS 637

## 2025-03-21 RX ORDER — NALOXONE HYDROCHLORIDE 0.4 MG/ML
0.1 INJECTION, SOLUTION INTRAMUSCULAR; INTRAVENOUS; SUBCUTANEOUS
Status: CANCELLED | OUTPATIENT
Start: 2025-03-21

## 2025-03-21 RX ORDER — SODIUM CHLORIDE, SODIUM LACTATE, POTASSIUM CHLORIDE, CALCIUM CHLORIDE 600; 310; 30; 20 MG/100ML; MG/100ML; MG/100ML; MG/100ML
INJECTION, SOLUTION INTRAVENOUS CONTINUOUS
Status: CANCELLED | OUTPATIENT
Start: 2025-03-21

## 2025-03-21 RX ORDER — FENTANYL CITRATE 50 UG/ML
50 INJECTION, SOLUTION INTRAMUSCULAR; INTRAVENOUS EVERY 5 MIN PRN
Status: CANCELLED | OUTPATIENT
Start: 2025-03-21

## 2025-03-21 RX ORDER — FERROUS SULFATE 325(65) MG
325 TABLET ORAL DAILY
Status: DISCONTINUED | OUTPATIENT
Start: 2025-03-22 | End: 2025-03-25 | Stop reason: HOSPADM

## 2025-03-21 RX ORDER — FENTANYL CITRATE 50 UG/ML
25 INJECTION, SOLUTION INTRAMUSCULAR; INTRAVENOUS EVERY 5 MIN PRN
Status: CANCELLED | OUTPATIENT
Start: 2025-03-21

## 2025-03-21 RX ORDER — ONDANSETRON 4 MG/1
4 TABLET, ORALLY DISINTEGRATING ORAL EVERY 30 MIN PRN
Status: CANCELLED | OUTPATIENT
Start: 2025-03-21

## 2025-03-21 RX ORDER — ONDANSETRON 2 MG/ML
4 INJECTION INTRAMUSCULAR; INTRAVENOUS EVERY 30 MIN PRN
Status: CANCELLED | OUTPATIENT
Start: 2025-03-21

## 2025-03-21 RX ORDER — SODIUM CHLORIDE, SODIUM LACTATE, POTASSIUM CHLORIDE, CALCIUM CHLORIDE 600; 310; 30; 20 MG/100ML; MG/100ML; MG/100ML; MG/100ML
INJECTION, SOLUTION INTRAVENOUS CONTINUOUS
Status: DISCONTINUED | OUTPATIENT
Start: 2025-03-21 | End: 2025-03-21 | Stop reason: HOSPADM

## 2025-03-21 RX ORDER — LIDOCAINE 40 MG/G
CREAM TOPICAL
Status: DISCONTINUED | OUTPATIENT
Start: 2025-03-21 | End: 2025-03-21 | Stop reason: HOSPADM

## 2025-03-21 RX ORDER — DEXAMETHASONE SODIUM PHOSPHATE 10 MG/ML
4 INJECTION, SOLUTION INTRAMUSCULAR; INTRAVENOUS
Status: CANCELLED | OUTPATIENT
Start: 2025-03-21

## 2025-03-21 RX ORDER — FUROSEMIDE 20 MG/1
40 TABLET ORAL DAILY
Status: DISCONTINUED | OUTPATIENT
Start: 2025-03-22 | End: 2025-03-22

## 2025-03-21 RX ADMIN — ACETAMINOPHEN 650 MG: 325 TABLET ORAL at 17:25

## 2025-03-21 RX ADMIN — FUROSEMIDE 20 MG: 20 TABLET ORAL at 08:59

## 2025-03-21 RX ADMIN — SIMVASTATIN 40 MG: 40 TABLET, FILM COATED ORAL at 21:31

## 2025-03-21 RX ADMIN — GABAPENTIN 200 MG: 100 CAPSULE ORAL at 08:57

## 2025-03-21 RX ADMIN — CEFTRIAXONE SODIUM 2 G: 2 INJECTION, POWDER, FOR SOLUTION INTRAMUSCULAR; INTRAVENOUS at 17:34

## 2025-03-21 RX ADMIN — ACETAMINOPHEN 650 MG: 325 TABLET ORAL at 21:31

## 2025-03-21 RX ADMIN — CITALOPRAM HYDROBROMIDE 40 MG: 40 TABLET, FILM COATED ORAL at 12:45

## 2025-03-21 RX ADMIN — Medication 60 ML: at 09:02

## 2025-03-21 RX ADMIN — HYDROMORPHONE HYDROCHLORIDE 2 MG: 2 TABLET ORAL at 17:29

## 2025-03-21 RX ADMIN — ANORECTAL OINTMENT: 15.7; .44; 24; 20.6 OINTMENT TOPICAL at 08:58

## 2025-03-21 RX ADMIN — HYDROMORPHONE HYDROCHLORIDE 2 MG: 2 TABLET ORAL at 13:35

## 2025-03-21 RX ADMIN — ACETAMINOPHEN 650 MG: 325 TABLET ORAL at 04:06

## 2025-03-21 RX ADMIN — FERROUS SULFATE TAB 325 MG (65 MG ELEMENTAL FE) 325 MG: 325 (65 FE) TAB at 09:00

## 2025-03-21 RX ADMIN — GABAPENTIN 200 MG: 100 CAPSULE ORAL at 21:32

## 2025-03-21 RX ADMIN — ROPINIROLE HYDROCHLORIDE 1 MG: 1 TABLET, FILM COATED ORAL at 21:31

## 2025-03-21 RX ADMIN — ROPINIROLE HYDROCHLORIDE 0.5 MG: 0.25 TABLET, FILM COATED ORAL at 17:29

## 2025-03-21 RX ADMIN — PANTOPRAZOLE SODIUM 40 MG: 40 TABLET, DELAYED RELEASE ORAL at 17:25

## 2025-03-21 RX ADMIN — PANTOPRAZOLE SODIUM 40 MG: 40 TABLET, DELAYED RELEASE ORAL at 08:59

## 2025-03-21 RX ADMIN — HYDROMORPHONE HYDROCHLORIDE 2 MG: 2 TABLET ORAL at 08:54

## 2025-03-21 RX ADMIN — ROPINIROLE HYDROCHLORIDE 0.5 MG: 0.25 TABLET, FILM COATED ORAL at 09:01

## 2025-03-21 RX ADMIN — ACETAMINOPHEN 650 MG: 325 TABLET ORAL at 12:45

## 2025-03-21 RX ADMIN — SPIRONOLACTONE 25 MG: 25 TABLET, FILM COATED ORAL at 08:58

## 2025-03-21 RX ADMIN — BUPROPION HYDROCHLORIDE 150 MG: 150 TABLET, FILM COATED, EXTENDED RELEASE ORAL at 09:00

## 2025-03-21 RX ADMIN — SODIUM CHLORIDE 1750 MG: 0.9 INJECTION, SOLUTION INTRAVENOUS at 10:24

## 2025-03-21 ASSESSMENT — ACTIVITIES OF DAILY LIVING (ADL)
ADLS_ACUITY_SCORE: 56
ADLS_ACUITY_SCORE: 55
ADLS_ACUITY_SCORE: 52
ADLS_ACUITY_SCORE: 54
ADLS_ACUITY_SCORE: 54
ADLS_ACUITY_SCORE: 52
ADLS_ACUITY_SCORE: 48
ADLS_ACUITY_SCORE: 52
ADLS_ACUITY_SCORE: 48
ADLS_ACUITY_SCORE: 52
ADLS_ACUITY_SCORE: 48
ADLS_ACUITY_SCORE: 54
ADLS_ACUITY_SCORE: 52
ADLS_ACUITY_SCORE: 55
ADLS_ACUITY_SCORE: 54
ADLS_ACUITY_SCORE: 48
ADLS_ACUITY_SCORE: 52
ADLS_ACUITY_SCORE: 56

## 2025-03-21 NOTE — PLAN OF CARE
Problem: Pain Acute  Goal: Optimal Pain Control and Function  Outcome: Progressing  Intervention: Develop Pain Management Plan  Recent Flowsheet Documentation  Taken 3/21/2025 1335 by Zollinger, Nancy K, RN  Pain Management Interventions: medication (see MAR)  Taken 3/21/2025 1245 by Zollinger, Nancy K, RN  Pain Management Interventions: medication (see MAR)  Taken 3/21/2025 0854 by Zollinger, Nancy K, RN  Pain Management Interventions:   medication (see MAR)   repositioned   Goal Outcome Evaluation:  Returned from EGD this AM.  Tolerating a regular diet.  Oral dilaudid given x2 for lower extremity pain.  Hgb 7.8 this AM.  Up in chair with assist x2.

## 2025-03-21 NOTE — PHARMACY-VANCOMYCIN DOSING SERVICE
Pharmacy Vancomycin Note  Date of Service 2025  Patient's  1945   79 year old, female    Indication: Skin and Soft Tissue Infection  Day of Therapy: 10  Current vancomycin regimen:  750 mg IV q12h  Current vancomycin monitoring method: AUC  Current vancomycin therapeutic monitoring goal: 400-600 mg*h/L    [employer] Prediction of Current Vancomycin Regimen  Regimen: 750 mg IV every 12 hours.  Start time: 19:53 on 2025  Exposure target: AUC24 (range)400-600 mg/L.hr   AUC24,ss: 418 mg/L.hr  Probability of AUC24 > 400: 75 %  Ctrough,ss: 14.9 mg/L  Probability of Ctrough,ss > 20: 0 %  Probability of nephrotoxicity (Lodise LOUIS ): 10 %      Current estimated CrCl = Estimated Creatinine Clearance: 58.3 mL/min (based on SCr of 0.94 mg/dL).    Creatinine for last 3 days  3/19/2025:  6:54 AM Creatinine 1.03 mg/dL  3/20/2025:  6:40 AM Creatinine 0.95 mg/dL  3/21/2025:  5:53 AM Creatinine 0.94 mg/dL    Recent Vancomycin Levels (past 3 days)  3/21/2025:  5:53 AM Vancomycin 16.7 ug/mL    Vancomycin IV Administrations (past 72 hours)                     vancomycin (VANCOCIN) 750 mg in 0.9% NaCl 257.5 mL intermittent infusion (mg) 750 mg New Bag 25 1953     750 mg New Bag  1053     750 mg New Bag 25     750 mg New Bag  0810     750 mg New Bag 25     750 mg New Bag  0914                    Nephrotoxins and other renal medications (From now, onward)      Start     Dose/Rate Route Frequency Ordered Stop    03/15/25 0800  vancomycin (VANCOCIN) 750 mg in 0.9% NaCl 257.5 mL intermittent infusion         750 mg  over 60 Minutes Intravenous EVERY 12 HOURS 03/15/25 0728      25 0800  furosemide (LASIX) tablet 20 mg        Note to Pharmacy: PTA Sig:Take 20 mg by mouth 2 times daily.      20 mg Oral 2 TIMES DAILY (Diuretics and Nitrates) 25                 Contrast Orders - past 72 hours (72h ago, onward)      None            Interpretation of levels and current  "regimen:  Vancomycin level is reflective of therapeutic level    Has serum creatinine changed greater than 50% in last 72 hours: No    Renal Function: Improving    InsightRX Prediction of Planned New Vancomycin Regimen  Regimen: 1750 mg IV every 24 hours.  Start time: 19:53 on 03/21/2025  Exposure target: AUC24 (range)400-600 mg/L.hr   AUC24,ss: 474 mg/L.hr  Probability of AUC24 > 400: 100 %  Ctrough,ss: 14.7 mg/L  Probability of Ctrough,ss > 20: 1 %  Probability of nephrotoxicity (Lodise LOUIS 2009): 10 %      Plan:  Vancomycin AUC is barely within goal range and creatinine continues to increase incrementally. Patient also missed a dose of vancomycin while in OR this morning. Therefore, taking this opportunity to change vancomcyin regimen to one with better AUC predictions and \"re-load\" after missed dose. Increase vancomycin regimen to 1750 mg q24h.  Vancomycin monitoring method: AUC  Vancomycin therapeutic monitoring goal: 400-600 mg*h/L  Pharmacy will check vancomycin levels as appropriate in 1-3 Days.  Serum creatinine levels will be ordered a minimum of twice weekly.    Wanda Hicks [facility]  "

## 2025-03-21 NOTE — PROGRESS NOTES
Infectious Diseases Progress Note  [facility]    Date of visit: 03/21/2025     ASSESSMENT   79-year-old woman with a history of chronic lymphedema, hyperlipidemia, GERD who was admitted for left leg cellulitis.    Left leg cellulitis.  Presenting with fevers then redness and swelling in the entire left leg up into the buttock.  No open lesions or fluctuant areas.  Limited left lower extremity ultrasound without DVT.  Presenting with leukocytosis and elevated inflammatory markers. Wbc and CRP slow improvement. CT left thigh/pelvis showing possible myositis/fasciitis, no abscess. Improving with antibiotics. Wbc improving   Chronic stasis dermatitis, right leg.  History of recurrent infections in the right leg due to open lesions.  Recently placed on minocycline for lifelong suppression.  (50 mg daily)  Anemia. Needing transfusions. Seeing GI, no obvious source of blood loss. EGD with Brian lesions and small erosion with evidence of recent bleeding. On PPI      Principal Problem:    Cellulitis of left lower extremity  Active Problems:    Acquired lymphedema of leg    Morbid obesity (H)    Peripheral edema    KIKE (acute kidney injury)       PLAN   -continue vancomycin and ceftriaxone   -suspect ongoing leukocytosis related to recent bleeding (erythroblastic response) seeing that platelets are also rising  -continues to slowly improve; anticipate po antibiotics in 1-2 days  -trend CRP, WBC      Son Rodriguez MD  [facility]  Direct messaging: [employer]   On-Call ID provider: [phone], option: 9         ===========================================      SUBJECTIVE / INTERVAL HISTORY:     EGD today. Feeling better today. No acute issues.      Antibiotics   Vancomycin 3/12-  Ceftriaxone 3/17-    Previous:  Zosyn 3/12-3/17  Minocycline prior to arrival      Physical Exam     Temp:  [98  F (36.7  C)-99.8  F (37.7  C)] 98  F (36.7  C)  Pulse:  [71-80] 74  Resp:  [18-30] 18  BP:  (109-137)/(53-67) 116/57  SpO2:  [91 %-99 %] 93 %    /57 (BP Location: Right arm)   Pulse 74   Temp 98  F (36.7  C) (Oral)   Resp 18   Ht 1.524 m (5')   Wt 116 kg (255 lb 11.7 oz)   SpO2 93%   BMI 49.94 kg/m      GENERAL:  well-developed, well-nourished, lying in bed in no acute distress.   HENT:  Head is normocephalic, atraumatic.   EYES:  Eyes have anicteric sclerae without conjunctival injection   LUNGS:  normal respiratory pattern   EXT: improving erythema on left leg. Right leg not examined without dressing.  SKIN:  No acute rashes.   NEUROLOGIC:  Grossly nonfocal.      Cultures   3/12 blood cultures x 2: No growth to date    No results found for the last 90 days.       Laboratory results     Recent Labs   Lab 03/21/25  0553 03/20/25  0640 03/19/25  0654   WBC 12.0* 12.1* 14.1*   HGB 7.8* 6.7* 7.4*   * 492* 499*       Recent Labs   Lab 03/18/25  0658   ALBUMIN 2.2*   ALKPHOS 635*   ALT 42   AST 35       Recent Labs   Lab 03/21/25  0553 03/20/25  0640 03/18/25  0658   CRPI 151.60* 153.10* 191.10*           Imaging   Radiology results reviewed    US Lower Extremity Venous Duplex Bilateral    Result Date: 3/12/2025  EXAM: US LOWER EXTREMITY VENOUS DUPLEX BILATERAL LOCATION: [facility] DATE: 3/12/2025 INDICATION: redness, swelling COMPARISON: None. TECHNIQUE: Venous Duplex ultrasound of bilateral lower extremities with and without compression, augmentation and duplex. Color flow and spectral Doppler with waveform analysis performed. FINDINGS: Exam includes the common femoral, femoral, popliteal veins as well as segmentally visualized deep calf veins and greater saphenous vein. RIGHT: No deep vein thrombosis. No superficial thrombophlebitis. No popliteal cyst. LEFT: No deep vein thrombosis. No superficial thrombophlebitis. No popliteal cyst.     IMPRESSION: 1.  No deep venous thrombosis in the bilateral lower extremities. 2.  Suboptimal exam due to patient body habitus  and edema. Patient declined unwrapping of the calves due to open wounds. Calf vessels only partially visualized but negative for thrombus were seen.      Data reviewed today: I reviewed all medications, new labs and imaging results over the last 24 hours. I personally reviewed no images or EKG's today.  The patient's care was discussed with the Patient

## 2025-03-21 NOTE — PLAN OF CARE
Problem: Pain Acute  Goal: Optimal Pain Control and Function  Outcome: Progressing  Intervention: Develop Pain Management Plan  Recent Flowsheet Documentation  Taken 3/20/2025 1738 by Jazmyn Castro RN  Pain Management Interventions: medication (see MAR)  Intervention: Prevent or Manage Pain  Recent Flowsheet Documentation  Taken 3/20/2025 1623 by Jazmyn Castro RN  Medication Review/Management: medications reviewed   Goal Outcome Evaluation:    Pt is A+O x4, on RA. Has severe pain in BLE, managed by prn dilaudid x1 + scheduled tylenol. Dressing clean, dry, intact. Pt has skin irritation/ blanachble redness on perineum and inguinal folds. Calmospetine and miconazole powder applied. Up two assist w/ belt and walker to commode. Pt is eating, drinking and voiding well. Visited with family at bedside. Able to make her needs know.     NPO at midnight for EGD tmrw.     Jazmyn Castro RN.

## 2025-03-21 NOTE — INTERVAL H&P NOTE
I have reviewed the surgical (or preoperative) H&P that is linked to this encounter, and examined the patient. There are no significant changes    Clinical Conditions Present on Arrival:  Clinically Significant Risk Factors Present on Admission                   EGD to further evaluate GI blood loss anemia

## 2025-03-21 NOTE — PLAN OF CARE
Problem: Adult Inpatient Plan of Care  Goal: Optimal Comfort and Wellbeing  Outcome: Progressing  Intervention: Monitor Pain and Promote Comfort  Recent Flowsheet Documentation  Taken 3/20/2025 0098 by Sirena Mora RN  Pain Management Interventions: medication (see MAR)   Goal Outcome Evaluation:       Pt alert/oriented, LE pain managed with tylenol and dilaudid with relief. Dressing clean, dry. NPO from midnight, external cath in place. Awaiting EGD this morning. Call light within reach.

## 2025-03-21 NOTE — PROGRESS NOTES
[facility]    Medicine Progress Note - Hospitalist Service    Date of Admission:  3/12/2025    Assessment & Plan   Elizabeth Kelley is a 79 year old female with history of recurrent right lower extremity cellulitis, chronic lower extremity lymphedema, and chronic venous stasis,  who is admitted for LEFT lower extremity cellulitis which is slow to improve.  Currently on IV vancomycin and ceftriaxone and ID anticipates p.o. antibiotics in 2 to 3 days.  Hospital course complicated by acute on chronic anemia, suspect multifactorial.  S/p 2 units PRBCs.       Left Lower Extremity Cellulitis  Presents with LEFT lower extremity erythema, edema.  Admitted 1/12 - 1/18/2025 for RIGHT lower extremity cellulitis and sepsis.  She was treated with meropenem and vancomycin, then transitioned to doxycycline.  3 weeks ago started suppression with minocycline 50 mg daily   Had elevated procalcitonin, CRP and leukocytosis.  -> 153  Ultrasound negative for DVT.  ABIs wnl   CT scan of the left thigh shows no abscess and no osteomyelitis.   On Vanco since 3/12 - present, Zosyn 3/12 - 3/17, ceftriaxone since 3/17- present   ID on board   WOC RN is following      Acute on chronic anemia.  Likely multifactorial.   Some oozing from the RLE wounds but otherwise no obvious active bleeding.  Has h/o upper GIB due to PUD, recent colonoscopy was unremarkable   labs suggestive of DAYANARA, B12 and folate normal, no evidence of hemolysis  transfused  2 units  PRBCs on 3/18 and 3/20   PPI BID   EGD done on 03/21 reported with sizable hiatal hernia with Brian lesions.  Endoscopic intervention was deferred  Surgical repair would be definitive management ideally  Iron daily      KIKE - improving, creatinine back to baseline   Monitor      Chronic Bilateral Lower Extremity Lymphedema, Chronic Venous Stasis  Continue spironolactone/Lasix   PT for lymphedema wraps   WOC consulted     Generalized weakness  PT/OT are recommending  "TCU, patient has been declining so far     Chronic stable conditions:   Restless leg syndrome - continue PTA ropinirole  Anxiety depression - continue PTA Wellbutrin and Celexa  Hyperlipidemia - continue PTA statin   GERD - continue PTA PPI  Obesity: BMI 48.             Diet: Combination Diet    DVT Prophylaxis: CI   Amato Catheter: Not present  Lines: PRESENT      PICC 03/20/25 Double Lumen Left Basilic Vascular access-Site Assessment: WDL      Cardiac Monitoring: None  Code Status: Full Code      Clinically Significant Risk Factors               # Hypoalbuminemia: Lowest albumin = 2.2 g/dL at 3/18/2025  6:58 AM, will monitor as appropriate     # Hypertension: Noted on problem list            # Severe Obesity: Estimated body mass index is 49.94 kg/m  as calculated from the following:    Height as of this encounter: 1.524 m (5').    Weight as of this encounter: 116 kg (255 lb 11.7 oz).      # Financial/Environmental Concerns: none;other (see comments) (Significant other states, \"without me she would be living on the streets and be unable to afford food, but since I still work full time we do just fine. Her  left her and kicked her out of the house about 15 years ago\".)         Social Drivers of Health    Tobacco Use: Medium Risk (10/21/2024)    Patient History     Smoking Tobacco Use: Former     Smokeless Tobacco Use: Never          Disposition Plan     Medically Ready for Discharge: Anticipated in 2-4 Days             Demetris Griffith MD  Hospitalist Service  [facility]  Securely message with [employer] (more info)  Text page via [employer] Paging/Directory   ______________________________________________________________________    Interval History   Patient is new to me today.  Chart, labs and imaging results reviewed.  Also discussed with gastroenterologist regarding EGD results today.  She reports having significant pain and discomfort on her bilateral lower extremity.  No new " complaints.  Management plan discussed with the patient and she expressed understanding.    Physical Exam   Vital Signs: Temp: 98.5  F (36.9  C) Temp src: Oral BP: 133/63 Pulse: 78   Resp: 20 SpO2: 94 % O2 Device: Nasal cannula Oxygen Delivery: 2 LPM  Weight: 255 lbs 11.74 oz    General Appearance: No distress noted  Respiratory: Good air entry bilaterally  Cardiovascular: S1 and S2 heard  GI: Soft abdomen, no tenderness, normoactive bowel sound  Skin: Bilateral lower extremity red and swollen with lymphedema wraps       Medical Decision Making       40 MINUTES SPENT BY ME on the date of service doing chart review, history, exam, documentation & further activities per the note.      Data

## 2025-03-21 NOTE — PROGRESS NOTES
Status post EGD on 3/21.  Patient noted to have a sizable hiatal hernia with Brian lesions.  These are not poorly amenable to endoscopic intervention as the treatment is usually transient and likely predisposes to a larger ulcer in the future.  There is a very minimal amount of oozing from these Brian lesions.  The mainstay of treatment is aggressive PPI therapy and iron supplementation.  If patient continues to be iron deficient ultimately patient may need to have definitive surgical repair.  We will certainly try medical management first.  Okay to resume previous diet.  Ideally would avoid any antiplatelet medication as able.    Mazin Rome, DO  Gastroenterologist  [facility]

## 2025-03-22 ENCOUNTER — APPOINTMENT (OUTPATIENT)
Dept: PHYSICAL THERAPY | Facility: HOSPITAL | Age: 80
End: 2025-03-22
Payer: COMMERCIAL

## 2025-03-22 LAB
ANION GAP SERPL CALCULATED.3IONS-SCNC: 7 MMOL/L (ref 7–15)
BUN SERPL-MCNC: 17.7 MG/DL (ref 8–23)
CALCIUM SERPL-MCNC: 8.6 MG/DL (ref 8.8–10.4)
CHLORIDE SERPL-SCNC: 99 MMOL/L (ref 98–107)
CREAT SERPL-MCNC: 0.86 MG/DL (ref 0.51–0.95)
CRP SERPL-MCNC: 150.7 MG/L
EGFRCR SERPLBLD CKD-EPI 2021: 69 ML/MIN/1.73M2
ERYTHROCYTE [DISTWIDTH] IN BLOOD BY AUTOMATED COUNT: 18.5 % (ref 10–15)
GLUCOSE SERPL-MCNC: 98 MG/DL (ref 70–99)
HCO3 SERPL-SCNC: 31 MMOL/L (ref 22–29)
HCT VFR BLD AUTO: 24 % (ref 35–47)
HGB BLD-MCNC: 7.5 G/DL (ref 11.7–15.7)
MCH RBC QN AUTO: 25.8 PG (ref 26.5–33)
MCHC RBC AUTO-ENTMCNC: 31.3 G/DL (ref 31.5–36.5)
MCV RBC AUTO: 83 FL (ref 78–100)
PLATELET # BLD AUTO: 567 10E3/UL (ref 150–450)
POTASSIUM SERPL-SCNC: 3.5 MMOL/L (ref 3.4–5.3)
RBC # BLD AUTO: 2.91 10E6/UL (ref 3.8–5.2)
SODIUM SERPL-SCNC: 137 MMOL/L (ref 135–145)
WBC # BLD AUTO: 9.6 10E3/UL (ref 4–11)

## 2025-03-22 PROCEDURE — 80048 BASIC METABOLIC PNL TOTAL CA: CPT | Performed by: STUDENT IN AN ORGANIZED HEALTH CARE EDUCATION/TRAINING PROGRAM

## 2025-03-22 PROCEDURE — 250N000011 HC RX IP 250 OP 636: Performed by: INTERNAL MEDICINE

## 2025-03-22 PROCEDURE — 120N000001 HC R&B MED SURG/OB

## 2025-03-22 PROCEDURE — 250N000011 HC RX IP 250 OP 636: Mod: JZ | Performed by: NURSE PRACTITIONER

## 2025-03-22 PROCEDURE — 250N000013 HC RX MED GY IP 250 OP 250 PS 637: Performed by: NURSE PRACTITIONER

## 2025-03-22 PROCEDURE — 86140 C-REACTIVE PROTEIN: CPT | Performed by: INTERNAL MEDICINE

## 2025-03-22 PROCEDURE — 250N000013 HC RX MED GY IP 250 OP 250 PS 637: Performed by: INTERNAL MEDICINE

## 2025-03-22 PROCEDURE — 99232 SBSQ HOSP IP/OBS MODERATE 35: CPT | Performed by: STUDENT IN AN ORGANIZED HEALTH CARE EDUCATION/TRAINING PROGRAM

## 2025-03-22 PROCEDURE — 250N000013 HC RX MED GY IP 250 OP 250 PS 637

## 2025-03-22 PROCEDURE — 99232 SBSQ HOSP IP/OBS MODERATE 35: CPT | Performed by: INTERNAL MEDICINE

## 2025-03-22 PROCEDURE — 250N000013 HC RX MED GY IP 250 OP 250 PS 637: Performed by: STUDENT IN AN ORGANIZED HEALTH CARE EDUCATION/TRAINING PROGRAM

## 2025-03-22 PROCEDURE — 85027 COMPLETE CBC AUTOMATED: CPT | Performed by: STUDENT IN AN ORGANIZED HEALTH CARE EDUCATION/TRAINING PROGRAM

## 2025-03-22 PROCEDURE — 258N000003 HC RX IP 258 OP 636: Performed by: INTERNAL MEDICINE

## 2025-03-22 PROCEDURE — 97116 GAIT TRAINING THERAPY: CPT | Mod: GP

## 2025-03-22 PROCEDURE — 250N000013 HC RX MED GY IP 250 OP 250 PS 637: Performed by: PHYSICIAN ASSISTANT

## 2025-03-22 RX ORDER — FUROSEMIDE 20 MG/1
60 TABLET ORAL DAILY
Status: DISCONTINUED | OUTPATIENT
Start: 2025-03-23 | End: 2025-03-25 | Stop reason: HOSPADM

## 2025-03-22 RX ADMIN — GABAPENTIN 200 MG: 100 CAPSULE ORAL at 09:09

## 2025-03-22 RX ADMIN — BUPROPION HYDROCHLORIDE 150 MG: 150 TABLET, FILM COATED, EXTENDED RELEASE ORAL at 09:08

## 2025-03-22 RX ADMIN — ACETAMINOPHEN 650 MG: 325 TABLET ORAL at 23:49

## 2025-03-22 RX ADMIN — CEFTRIAXONE SODIUM 2 G: 2 INJECTION, POWDER, FOR SOLUTION INTRAMUSCULAR; INTRAVENOUS at 16:28

## 2025-03-22 RX ADMIN — Medication 60 ML: at 09:10

## 2025-03-22 RX ADMIN — FERROUS SULFATE TAB 325 MG (65 MG ELEMENTAL FE) 325 MG: 325 (65 FE) TAB at 09:09

## 2025-03-22 RX ADMIN — ROPINIROLE HYDROCHLORIDE 0.5 MG: 0.25 TABLET, FILM COATED ORAL at 09:08

## 2025-03-22 RX ADMIN — PANTOPRAZOLE SODIUM 40 MG: 40 TABLET, DELAYED RELEASE ORAL at 07:02

## 2025-03-22 RX ADMIN — ACETAMINOPHEN 650 MG: 325 TABLET ORAL at 00:39

## 2025-03-22 RX ADMIN — ROPINIROLE HYDROCHLORIDE 1 MG: 1 TABLET, FILM COATED ORAL at 21:18

## 2025-03-22 RX ADMIN — ACETAMINOPHEN 650 MG: 325 TABLET ORAL at 12:38

## 2025-03-22 RX ADMIN — FUROSEMIDE 40 MG: 20 TABLET ORAL at 09:10

## 2025-03-22 RX ADMIN — ACETAMINOPHEN 650 MG: 325 TABLET ORAL at 21:16

## 2025-03-22 RX ADMIN — SPIRONOLACTONE 25 MG: 25 TABLET, FILM COATED ORAL at 09:10

## 2025-03-22 RX ADMIN — ROPINIROLE HYDROCHLORIDE 0.5 MG: 0.25 TABLET, FILM COATED ORAL at 16:28

## 2025-03-22 RX ADMIN — GABAPENTIN 200 MG: 100 CAPSULE ORAL at 21:16

## 2025-03-22 RX ADMIN — ACETAMINOPHEN 650 MG: 325 TABLET ORAL at 09:10

## 2025-03-22 RX ADMIN — HYDROMORPHONE HYDROCHLORIDE 2 MG: 2 TABLET ORAL at 19:23

## 2025-03-22 RX ADMIN — CITALOPRAM HYDROBROMIDE 40 MG: 40 TABLET, FILM COATED ORAL at 12:39

## 2025-03-22 RX ADMIN — SODIUM CHLORIDE 1750 MG: 0.9 INJECTION, SOLUTION INTRAVENOUS at 10:42

## 2025-03-22 RX ADMIN — PANTOPRAZOLE SODIUM 40 MG: 40 TABLET, DELAYED RELEASE ORAL at 16:28

## 2025-03-22 RX ADMIN — ANORECTAL OINTMENT: 15.7; .44; 24; 20.6 OINTMENT TOPICAL at 09:10

## 2025-03-22 RX ADMIN — ACETAMINOPHEN 650 MG: 325 TABLET ORAL at 16:28

## 2025-03-22 RX ADMIN — HYDROMORPHONE HYDROCHLORIDE 0.5 MG: 1 INJECTION, SOLUTION INTRAMUSCULAR; INTRAVENOUS; SUBCUTANEOUS at 13:53

## 2025-03-22 RX ADMIN — SIMVASTATIN 40 MG: 40 TABLET, FILM COATED ORAL at 21:16

## 2025-03-22 ASSESSMENT — ACTIVITIES OF DAILY LIVING (ADL)
ADLS_ACUITY_SCORE: 48
ADLS_ACUITY_SCORE: 52
ADLS_ACUITY_SCORE: 49
ADLS_ACUITY_SCORE: 48
ADLS_ACUITY_SCORE: 52
ADLS_ACUITY_SCORE: 48
ADLS_ACUITY_SCORE: 52
ADLS_ACUITY_SCORE: 52
ADLS_ACUITY_SCORE: 48
ADLS_ACUITY_SCORE: 48
ADLS_ACUITY_SCORE: 52
ADLS_ACUITY_SCORE: 48
ADLS_ACUITY_SCORE: 48
ADLS_ACUITY_SCORE: 52
ADLS_ACUITY_SCORE: 48
ADLS_ACUITY_SCORE: 48
ADLS_ACUITY_SCORE: 52
ADLS_ACUITY_SCORE: 48
ADLS_ACUITY_SCORE: 52
ADLS_ACUITY_SCORE: 52

## 2025-03-22 NOTE — PROGRESS NOTES
[facility]    Medicine Progress Note - Hospitalist Service    Date of Admission:  3/12/2025    Assessment & Plan   Elizabeth Kelley is a 79 year old female with history of recurrent right lower extremity cellulitis, chronic lower extremity lymphedema, and chronic venous stasis,  who is admitted for LEFT lower extremity cellulitis which is slow to improve.  Currently on IV vancomycin and ceftriaxone and ID anticipates p.o. antibiotics in 2 to 3 days.  Hospital course complicated by acute on chronic anemia, suspect multifactorial.  S/p 2 units PRBCs.       Left Lower Extremity Cellulitis  Presents with LEFT lower extremity erythema, edema.  Admitted 1/12 - 1/18/2025 for RIGHT lower extremity cellulitis and sepsis.  She was treated with meropenem and vancomycin, then transitioned to doxycycline.  3 weeks ago started suppression with minocycline 50 mg daily   Had elevated procalcitonin, CRP and leukocytosis.  -> 153  Ultrasound negative for DVT.  ABIs wnl   CT scan of the left thigh shows no abscess and no osteomyelitis.   On Vanco since 3/12 - present, Zosyn 3/12 - 3/17, ceftriaxone since 3/17- present   ID now signed off with recommendations in for antibiotics upon discharge.  WOC RN is following      Acute on chronic anemia.  Likely multifactorial.   Some oozing from the RLE wounds but otherwise no obvious active bleeding.  Has h/o upper GIB due to PUD, recent colonoscopy was unremarkable   labs suggestive of DAYANARA, B12 and folate normal, no evidence of hemolysis  transfused  2 units  PRBCs on 3/18 and 3/20   PPI BID   EGD done on 03/21 reported with sizable hiatal hernia with Brian lesions.  Endoscopic intervention was deferred  Surgical repair would be definitive management ideally  Iron supplement daily   -Hemoglobin is 7.5 today     KIKE - improving, creatinine back to baseline   Monitor      Chronic Bilateral Lower Extremity Lymphedema, Chronic Venous Stasis  Increase Lasix to 60 mg  "oral daily  Continue spironolactone 25 mg oral daily.  Plan to titrate up by tomorrow  PT for lymphedema wraps   WOC consulted     Generalized weakness  PT/OT are recommending TCU, patient has been declining so far     Chronic stable conditions:   Restless leg syndrome - continue PTA ropinirole  Anxiety depression - continue PTA Wellbutrin and Celexa  Hyperlipidemia - continue PTA statin   GERD - continue PTA PPI  Obesity: BMI 48.             Diet: Combination Diet Regular Diet Adult    DVT Prophylaxis: VTE Prophylaxis contraindicated due to BL LE cellulitis and severe anemia  Amato Catheter: Not present  Lines: PRESENT      PICC 03/20/25 Double Lumen Left Basilic Vascular access-Site Assessment: WDL      Cardiac Monitoring: None  Code Status: Full Code      Clinically Significant Risk Factors               # Hypoalbuminemia: Lowest albumin = 2.2 g/dL at 3/18/2025  6:58 AM, will monitor as appropriate     # Hypertension: Noted on problem list            # Severe Obesity: Estimated body mass index is 49.94 kg/m  as calculated from the following:    Height as of this encounter: 1.524 m (5').    Weight as of this encounter: 116 kg (255 lb 11.7 oz).      # Financial/Environmental Concerns: none;other (see comments) (Significant other states, \"without me she would be living on the streets and be unable to afford food, but since I still work full time we do just fine. Her  left her and kicked her out of the house about 15 years ago\".)         Social Drivers of Health    Tobacco Use: Medium Risk (10/21/2024)    Patient History     Smoking Tobacco Use: Former     Smokeless Tobacco Use: Never          Disposition Plan     Medically Ready for Discharge: Anticipated Tomorrow             Demetris Griffith MD  Hospitalist Service  [facility]  Securely message with [employer] (more info)  Text page via [employer] Paging/Directory "   ______________________________________________________________________    Interval History   No distress noted.  Patient states she is feeling slight improvement with regards to swelling and pain on her lower extremities.  Overall progress has been very slow.  Management plan discussed with the patient and she expressed understanding.    Physical Exam   Vital Signs: Temp: 98.4  F (36.9  C) Temp src: Oral BP: 121/58 Pulse: 77   Resp: 18 SpO2: (!) 91 % O2 Device: None (Room air)    Weight: 255 lbs 11.74 oz    General Appearance:  No distress noted  Respiratory: Good air entry bilaterally  Cardiovascular: S1 and S2 heard  GI: Soft abdomen, no tenderness, normoactive bowel sound  Skin: Bilateral lower extremity red and swollen with lymphedema wraps       Medical Decision Making       40 MINUTES SPENT BY ME on the date of service doing chart review, history, exam, documentation & further activities per the note.      Data

## 2025-03-22 NOTE — PLAN OF CARE
Problem: Pain Acute  Goal: Optimal Pain Control and Function  Outcome: Progressing  Intervention: Develop Pain Management Plan  Recent Flowsheet Documentation  Taken 3/22/2025 1353 by Zollinger, Nancy K, RN  Pain Management Interventions: medication (see MAR)  Taken 3/22/2025 1238 by Zollinger, Nancy K, RN  Pain Management Interventions: medication (see MAR)  Taken 3/22/2025 0910 by Zollinger, Nancy K, RN  Pain Management Interventions: medication (see MAR)   Goal Outcome Evaluation:  Hgb 7.5 this morning.  Had two laar tan stools.  Dressing changed to right LE per WOC orders.  IV dilaudid given x1 prior to dressing change.  Up this assist x1 with walker and gait belt.  Ambulated in hallway.

## 2025-03-22 NOTE — PROGRESS NOTES
GI PROGRESS NOTE  3/22/2025  Elizabeth Kelley  1945  /-79    Subjective:  She denies having GI complaints.  Stool was formed and tan.     Objective:    Patient Vitals for the past 24 hrs:   BP Temp Temp src Pulse Resp SpO2   03/22/25 0806 121/58 98.4  F (36.9  C) Oral 77 18 (!) 91 %   03/22/25 0402 137/63 98.3  F (36.8  C) Oral 77 18 92 %   03/21/25 2318 114/51 98.8  F (37.1  C) Oral 78 20 92 %   03/21/25 1910 130/83 98.3  F (36.8  C) Oral 76 20 92 %   03/21/25 1534 121/56 98.2  F (36.8  C) Oral 76 20 94 %   03/21/25 1335 -- -- -- -- -- 93 %     Body mass index is 49.94 kg/m .  Gen: NAD  GI: deferred    Laboratory  Recent Labs   Lab Test 03/22/25  0514 03/21/25  0553 03/20/25  0640 03/13/25  0814 03/12/25  1600   WBC 9.6 12.0* 12.1*   < > 19.3*   HGB 7.5* 7.8* 6.7*   < > 7.9*   MCV 83 83 80   < > 78   * 572* 492*   < > 337   INR  --   --   --   --  1.11    < > = values in this interval not displayed.     Recent Labs   Lab Test 03/22/25  0514 03/21/25  0553 03/20/25 2059 03/20/25  0640 03/14/25  0611 03/13/25  0814 03/12/25  1600     --   --   --   --  136 138   POTASSIUM 3.5  --   --   --   --  4.1 4.1   CHLORIDE 99  --   --   --   --  104 105   CO2 31*  --   --   --   --  22 23   BUN 17.7  --   --   --   --  37.3* 49.7*   CR 0.86 0.94  --  0.95   < > 1.29* 1.62*   ANIONGAP 7  --   --   --   --  10 10   JUNI 8.6*  --   --   --   --  8.6* 9.3   GLC 98  --  126*  --   --  100* 116*    < > = values in this interval not displayed.     Recent Labs   Lab Test 03/18/25  0658 03/13/25  0814 03/12/25  1600 01/12/25  1412 07/17/24  0705 07/16/24  1404 05/25/22  1637 04/09/22  1512   ALBUMIN 2.2* 2.4* 2.6*  --    < >  --    < >  --    BILITOTAL 0.2 0.2 <0.2  --    < >  --    < >  --    ALT 42 26 27  --    < >  --    < >  --    AST 35 21 15  --    < >  --    < >  --    ALKPHOS 635* 351* 300*  --    < >  --    < >  --    PROTEIN  --   --   --  20*  --  Negative  --  10*    < > = values in this interval  "not displayed.     3/21/2025 EGD  Findings:       The lower third of the esophagus was tortuous.        A large hiatal hernia with a few Brian ulcers was found. The Z-line        was 38 cm from the incisors. Small amount of oozing. Endoscopic        treatment deferred as this would likely make situation worse given        underlying etiology        A single localized 3 mm erosion with stigmata of recent bleeding (small        amount of hematin) was found on the greater curvature of the stomach. No        active bleeding.        The examined duodenum was normal.                                                                                    Moderate Sedation:        MAC   Impression:            - No specimens collected.   Recommendation:        - Return patient to hospital chan for ongoing care.                          - Resume previous diet today.                          - Continue present medications.                          - Needs PO PPI BID indefinitely with iron                          supplementation                          - Definitive management would ultimately be surgical                          repair of the hiatal hernia, however I would certainly                          try medical management first     Assessment/Plan:  She is a 79 year old female with a past medical history of obesity, hyperlipidemia, GERD, admitted with lower extremity cellulitis on antibiotics.  We are seeing her for normocytic anemia, and she reports having black stools at home more than a month ago. She was taking Aleve prior to admission, and she did have a gastric ulcer on EGD in 2015.  Last colonoscopy 9/2022 was unremarkable.     EGD here on 3/21/2025 showed a large hiatal hernia with small Brian's erosions with oozing, and also a single gastric erosion.  She may have ongoing slow GI bleeding related to the Brian's erosions, and she should avoid using NSAIDs.    Per post procedure note:  \"The mainstay of " "treatment is aggressive PPI therapy and iron supplementation. If patient continues to be iron deficient ultimately patient may need to have definitive surgical repair. We will certainly try medical management first. Okay to resume previous diet. Ideally would avoid any antiplatelet medication as able. \"    -- GI will sign off but please call if there are questions.    Patient Active Problem List   Diagnosis    Venous hypertension of lower extremity, bilateral    Acquired lymphedema of leg    Scar condition and fibrosis of skin    Ankle contracture, left    Morbid obesity with BMI of 50.0-59.9, adult (H)    Valgus deformity of both feet    Flat feet, bilateral    Gait abnormality    MS (multiple sclerosis) (H)    Depression    Vitamin D deficiency    GERD (gastroesophageal reflux disease)    Essential hypertension    History of malignant melanoma of skin    Edema    Major depression, single episode, in complete remission    Menopausal and postmenopausal disorder    Mixed hyperlipidemia    Morbid obesity (H)    Peripheral venous insufficiency    Postmenopausal    Restless legs    Chronic pain    Ulcer of right lower extremity with fat layer exposed (H)    Pain associated with wound    Impaired glucose tolerance    Iron deficiency anemia    Snoring    Sepsis (H)    Cellulitis    Hypoxia    Elevated troponin    Chronic ulcer of right leg (H)    Cellulitis of right lower leg    Acute sepsis (H)    Peripheral edema    Cellulitis of left lower extremity    KIKE (acute kidney injury)         15 minutes of total time was spent providing patient care, including patient evaluation, reviewing documentation/test results and .                                                Luh Morrison PA-C  Thank you for the opportunity to participate in the care of this patient.   Please feel free to call me with any questions or concerns.  Phone number [phone].                  "

## 2025-03-22 NOTE — PLAN OF CARE
"Goal Outcome Evaluation:    Pt is A/O x 4.  Report of pain in right shoulder to neck/clavicle but it disappeared. Refused tylenol. Lymph wraps on LLE and dressing on RLE are intact. Purewick in place. PICC line has some old drainage but still intact. Call light is within reach and pt calls appropriately.          Problem: Adult Inpatient Plan of Care  Goal: Plan of Care Review  Description: The Plan of Care Review/Shift note should be completed every shift.  The Outcome Evaluation is a brief statement about your assessment that the patient is improving, declining, or no change.  This information will be displayed automatically on your shift  note.  Outcome: Progressing  Goal: Patient-Specific Goal (Individualized)  Description: You can add care plan individualizations to a care plan. Examples of Individualization might be:  \"Parent requests to be called daily at 9am for status\", \"I have a hard time hearing out of my right ear\", or \"Do not touch me to wake me up as it startles  me\".  Outcome: Progressing  Goal: Absence of Hospital-Acquired Illness or Injury  Outcome: Progressing  Intervention: Identify and Manage Fall Risk  Recent Flowsheet Documentation  Taken 3/22/2025 0039 by Rebeca Quinn, HAROLDO  Safety Promotion/Fall Prevention:   activity supervised   clutter free environment maintained   nonskid shoes/slippers when out of bed   room door open   room near nurse's station   room organization consistent   safety round/check completed  Intervention: Prevent Skin Injury  Recent Flowsheet Documentation  Taken 3/22/2025 0039 by Rebeca Quinn, RN  Body Position: position changed independently  Intervention: Prevent Infection  Recent Flowsheet Documentation  Taken 3/22/2025 0039 by Rebeca Quinn, RN  Infection Prevention:   hand hygiene promoted   rest/sleep promoted   equipment surfaces disinfected   single patient room provided  Goal: Optimal Comfort and Wellbeing  Outcome: Progressing  Goal: Readiness for Transition of " Care  Outcome: Progressing     Problem: Delirium  Goal: Optimal Coping  Outcome: Progressing  Goal: Improved Behavioral Control  Outcome: Progressing  Intervention: Minimize Safety Risk  Recent Flowsheet Documentation  Taken 3/22/2025 0039 by Rebeca Quinn RN  Enhanced Safety Measures: pain management  Goal: Improved Attention and Thought Clarity  Outcome: Progressing  Goal: Improved Sleep  Outcome: Progressing     Problem: Skin or Soft Tissue Infection  Goal: Absence of Infection Signs and Symptoms  Outcome: Progressing  Intervention: Minimize and Manage Infection Progression  Recent Flowsheet Documentation  Taken 3/22/2025 0039 by Rebeca Quinn RN  Infection Prevention:   hand hygiene promoted   rest/sleep promoted   equipment surfaces disinfected   single patient room provided  Isolation Precautions: contact precautions maintained     Problem: Pain Acute  Goal: Optimal Pain Control and Function  Outcome: Progressing  Intervention: Prevent or Manage Pain  Recent Flowsheet Documentation  Taken 3/22/2025 0039 by Rebeca Quinn RN  Medication Review/Management: medications reviewed     Problem: Skin Injury Risk Increased  Goal: Skin Health and Integrity  Outcome: Progressing  Intervention: Plan: Nurse Driven Intervention: Moisture Management  Recent Flowsheet Documentation  Taken 3/22/2025 0039 by Rebeca Quinn RN  Moisture Interventions: Urinary collection device  Intervention: Plan: Nurse Driven Intervention: Friction and Shear  Recent Flowsheet Documentation  Taken 3/22/2025 0039 by Rebeca Quinn RN  Friction/Shear Interventions: HOB 30 degrees or less  Intervention: Optimize Skin Protection  Recent Flowsheet Documentation  Taken 3/22/2025 0039 by Rebeca Quinn RN  Head of Bed (HOB) Positioning: HOB at 20-30 degrees     Problem: Anemia  Goal: Anemia Symptom Improvement  Outcome: Progressing  Intervention: Monitor and Manage Anemia  Recent Flowsheet Documentation  Taken 3/22/2025 0039 by Rebeca Quinn RN  Safety Promotion/Fall  Prevention:   activity supervised   clutter free environment maintained   nonskid shoes/slippers when out of bed   room door open   room near nurse's station   room organization consistent   safety round/check completed

## 2025-03-22 NOTE — PROGRESS NOTES
Infectious Diseases Progress Note  [facility]    Date of visit: 03/22/2025     ASSESSMENT   79-year-old woman with a history of chronic lymphedema, hyperlipidemia, GERD who was admitted for left leg cellulitis.    Left leg cellulitis.  Presenting with fevers then redness and swelling in the entire left leg up into the buttock.  No open lesions or fluctuant areas.  Limited left lower extremity ultrasound without DVT.  Presenting with leukocytosis and elevated inflammatory markers. Wbc and CRP slow improvement. CT left thigh/pelvis showing possible myositis/fasciitis, no abscess. Improving with antibiotics. Wbc improving   Chronic stasis dermatitis, right leg.  History of recurrent infections in the right leg due to open lesions.  Recently placed on minocycline for lifelong suppression.  (50 mg daily)  Anemia. Needing transfusions. Seeing GI, no obvious source of blood loss. EGD with Brian lesions and small erosion with evidence of recent bleeding. On PPI      Principal Problem:    Cellulitis of left lower extremity  Active Problems:    Acquired lymphedema of leg    Morbid obesity (H)    Peripheral edema    KIKE (acute kidney injury)       PLAN   -okay to discharge from ID perspective  -discharge with cephalexin 1gm po tid and minocycline 100mg po bid x 7 days  -after completion resume chronic suppression with minocycline 50mg po daily  -continue lymphedema wraps    I will sign-off at this time. Please do not hesitate to call if there are any further questions or if there is a change in the patient's clinical status.       Son Rodriguez MD  [facility]  Direct messaging: [employer] Paging   On-Call ID provider: [phone], option: 9         ===========================================      SUBJECTIVE / INTERVAL HISTORY:     No events. Feels well.      Antibiotics   Vancomycin 3/12-  Ceftriaxone 3/17-    Previous:  Zosyn 3/12-3/17  Minocycline prior to arrival      Physical Exam      Temp:  [98  F (36.7  C)-98.8  F (37.1  C)] 98.4  F (36.9  C)  Pulse:  [72-78] 77  Resp:  [18-20] 18  BP: (114-137)/(51-83) 121/58  SpO2:  [91 %-97 %] 91 %    /58 (BP Location: Right arm)   Pulse 77   Temp 98.4  F (36.9  C) (Oral)   Resp 18   Ht 1.524 m (5')   Wt 116 kg (255 lb 11.7 oz)   SpO2 (!) 91%   BMI 49.94 kg/m      GENERAL:  well-developed, well-nourished, lying in bed in no acute distress.   HENT:  Head is normocephalic, atraumatic.   EYES:  Eyes have anicteric sclerae without conjunctival injection   LUNGS:  normal respiratory pattern   EXT: improving erythema and swelling on left leg. Right leg not examined without dressing.  SKIN:  No acute rashes.   NEUROLOGIC:  Grossly nonfocal.      Cultures   3/12 blood cultures x 2: No growth to date    No results found for the last 90 days.       Laboratory results     Recent Labs   Lab 03/22/25 0514 03/21/25  0553 03/20/25  0640   WBC 9.6 12.0* 12.1*   HGB 7.5* 7.8* 6.7*   * 572* 492*       Recent Labs   Lab 03/22/25 0514 03/18/25  0658     --    CO2 31*  --    BUN 17.7  --    ALBUMIN  --  2.2*   ALKPHOS  --  635*   ALT  --  42   AST  --  35       Recent Labs   Lab 03/22/25 0514 03/21/25 0553 03/20/25  0640   CRPI 150.70* 151.60* 153.10*           Imaging   Radiology results reviewed    US Lower Extremity Venous Duplex Bilateral    Result Date: 3/12/2025  EXAM: US LOWER EXTREMITY VENOUS DUPLEX BILATERAL LOCATION: [facility] DATE: 3/12/2025 INDICATION: redness, swelling COMPARISON: None. TECHNIQUE: Venous Duplex ultrasound of bilateral lower extremities with and without compression, augmentation and duplex. Color flow and spectral Doppler with waveform analysis performed. FINDINGS: Exam includes the common femoral, femoral, popliteal veins as well as segmentally visualized deep calf veins and greater saphenous vein. RIGHT: No deep vein thrombosis. No superficial thrombophlebitis. No popliteal cyst. LEFT: No  deep vein thrombosis. No superficial thrombophlebitis. No popliteal cyst.     IMPRESSION: 1.  No deep venous thrombosis in the bilateral lower extremities. 2.  Suboptimal exam due to patient body habitus and edema. Patient declined unwrapping of the calves due to open wounds. Calf vessels only partially visualized but negative for thrombus were seen.      Data reviewed today: I reviewed all medications, new labs and imaging results over the last 24 hours. I personally reviewed no images or EKG's today.  The patient's care was discussed with the Patient

## 2025-03-22 NOTE — PLAN OF CARE
Problem: Adult Inpatient Plan of Care  Goal: Absence of Hospital-Acquired Illness or Injury  Intervention: Identify and Manage Fall Risk  Recent Flowsheet Documentation  Taken 3/21/2025 1725 by Catie Moon, RN  Safety Promotion/Fall Prevention:   activity supervised   clutter free environment maintained   lighting adjusted   mobility aid in reach   nonskid shoes/slippers when out of bed     Problem: Adult Inpatient Plan of Care  Goal: Optimal Comfort and Wellbeing  Intervention: Monitor Pain and Promote Comfort  Recent Flowsheet Documentation  Taken 3/21/2025 1725 by Catie Moon, RN  Pain Management Interventions: medication (see MAR)   Goal Outcome Evaluation:       Pt A&Ox4, not oob this shift. Reported LLE, RLE pain, PRN dilaudid given x1 and scheduled tylenol with relief. Lymphedema wraps on LLE. Dressing on RLE. Purewick in place. PICC C/D/I. Makes needs known. Catie Moon, RN

## 2025-03-23 LAB
CREAT SERPL-MCNC: 0.85 MG/DL (ref 0.51–0.95)
EGFRCR SERPLBLD CKD-EPI 2021: 70 ML/MIN/1.73M2
HGB BLD-MCNC: 7.4 G/DL (ref 11.7–15.7)

## 2025-03-23 PROCEDURE — 250N000013 HC RX MED GY IP 250 OP 250 PS 637: Performed by: INTERNAL MEDICINE

## 2025-03-23 PROCEDURE — 250N000013 HC RX MED GY IP 250 OP 250 PS 637: Performed by: NURSE PRACTITIONER

## 2025-03-23 PROCEDURE — 250N000013 HC RX MED GY IP 250 OP 250 PS 637: Performed by: PHYSICIAN ASSISTANT

## 2025-03-23 PROCEDURE — 85018 HEMOGLOBIN: CPT | Performed by: STUDENT IN AN ORGANIZED HEALTH CARE EDUCATION/TRAINING PROGRAM

## 2025-03-23 PROCEDURE — 250N000013 HC RX MED GY IP 250 OP 250 PS 637: Performed by: STUDENT IN AN ORGANIZED HEALTH CARE EDUCATION/TRAINING PROGRAM

## 2025-03-23 PROCEDURE — 258N000003 HC RX IP 258 OP 636: Performed by: INTERNAL MEDICINE

## 2025-03-23 PROCEDURE — 250N000013 HC RX MED GY IP 250 OP 250 PS 637

## 2025-03-23 PROCEDURE — 99233 SBSQ HOSP IP/OBS HIGH 50: CPT | Performed by: INTERNAL MEDICINE

## 2025-03-23 PROCEDURE — 250N000011 HC RX IP 250 OP 636: Performed by: INTERNAL MEDICINE

## 2025-03-23 PROCEDURE — 82565 ASSAY OF CREATININE: CPT | Performed by: INTERNAL MEDICINE

## 2025-03-23 PROCEDURE — 120N000001 HC R&B MED SURG/OB

## 2025-03-23 RX ORDER — MINOCYCLINE HYDROCHLORIDE 100 MG/1
100 CAPSULE ORAL EVERY 12 HOURS SCHEDULED
Status: DISCONTINUED | OUTPATIENT
Start: 2025-03-23 | End: 2025-03-25 | Stop reason: HOSPADM

## 2025-03-23 RX ORDER — MINOCYCLINE HYDROCHLORIDE 100 MG/1
100 CAPSULE ORAL DAILY
Status: DISCONTINUED | OUTPATIENT
Start: 2025-03-23 | End: 2025-03-23

## 2025-03-23 RX ORDER — CEPHALEXIN 500 MG/1
1000 CAPSULE ORAL EVERY 8 HOURS SCHEDULED
Status: DISCONTINUED | OUTPATIENT
Start: 2025-03-23 | End: 2025-03-25 | Stop reason: HOSPADM

## 2025-03-23 RX ADMIN — FUROSEMIDE 60 MG: 20 TABLET ORAL at 10:22

## 2025-03-23 RX ADMIN — ROPINIROLE HYDROCHLORIDE 0.5 MG: 0.25 TABLET, FILM COATED ORAL at 17:25

## 2025-03-23 RX ADMIN — ACETAMINOPHEN 650 MG: 325 TABLET ORAL at 17:22

## 2025-03-23 RX ADMIN — HYDROMORPHONE HYDROCHLORIDE 2 MG: 2 TABLET ORAL at 11:46

## 2025-03-23 RX ADMIN — Medication 60 ML: at 10:27

## 2025-03-23 RX ADMIN — ACETAMINOPHEN 650 MG: 325 TABLET ORAL at 21:01

## 2025-03-23 RX ADMIN — ACETAMINOPHEN 650 MG: 325 TABLET ORAL at 10:22

## 2025-03-23 RX ADMIN — GABAPENTIN 200 MG: 100 CAPSULE ORAL at 10:22

## 2025-03-23 RX ADMIN — ACETAMINOPHEN 650 MG: 325 TABLET ORAL at 13:53

## 2025-03-23 RX ADMIN — HYDROMORPHONE HYDROCHLORIDE 2 MG: 2 TABLET ORAL at 14:53

## 2025-03-23 RX ADMIN — CEPHALEXIN 1000 MG: 500 CAPSULE ORAL at 21:01

## 2025-03-23 RX ADMIN — SPIRONOLACTONE 25 MG: 25 TABLET, FILM COATED ORAL at 10:22

## 2025-03-23 RX ADMIN — SIMVASTATIN 40 MG: 40 TABLET, FILM COATED ORAL at 21:01

## 2025-03-23 RX ADMIN — ANORECTAL OINTMENT: 15.7; .44; 24; 20.6 OINTMENT TOPICAL at 10:29

## 2025-03-23 RX ADMIN — MINOCYCLINE HYDROCHLORIDE 100 MG: 100 CAPSULE ORAL at 13:53

## 2025-03-23 RX ADMIN — CEPHALEXIN 1000 MG: 500 CAPSULE ORAL at 13:53

## 2025-03-23 RX ADMIN — PANTOPRAZOLE SODIUM 40 MG: 40 TABLET, DELAYED RELEASE ORAL at 06:48

## 2025-03-23 RX ADMIN — SODIUM CHLORIDE 1750 MG: 0.9 INJECTION, SOLUTION INTRAVENOUS at 10:51

## 2025-03-23 RX ADMIN — MINOCYCLINE HYDROCHLORIDE 100 MG: 100 CAPSULE ORAL at 21:01

## 2025-03-23 RX ADMIN — ACETAMINOPHEN 650 MG: 325 TABLET ORAL at 03:47

## 2025-03-23 RX ADMIN — ROPINIROLE HYDROCHLORIDE 1 MG: 1 TABLET, FILM COATED ORAL at 21:01

## 2025-03-23 RX ADMIN — MICONAZOLE NITRATE ANTIFUNGAL POWDER: 2 POWDER TOPICAL at 10:17

## 2025-03-23 RX ADMIN — FERROUS SULFATE TAB 325 MG (65 MG ELEMENTAL FE) 325 MG: 325 (65 FE) TAB at 10:22

## 2025-03-23 RX ADMIN — CITALOPRAM HYDROBROMIDE 40 MG: 40 TABLET, FILM COATED ORAL at 11:46

## 2025-03-23 RX ADMIN — BUPROPION HYDROCHLORIDE 150 MG: 150 TABLET, FILM COATED, EXTENDED RELEASE ORAL at 10:23

## 2025-03-23 RX ADMIN — PANTOPRAZOLE SODIUM 40 MG: 40 TABLET, DELAYED RELEASE ORAL at 17:22

## 2025-03-23 RX ADMIN — ANORECTAL OINTMENT: 15.7; .44; 24; 20.6 OINTMENT TOPICAL at 21:09

## 2025-03-23 RX ADMIN — GABAPENTIN 200 MG: 100 CAPSULE ORAL at 21:00

## 2025-03-23 RX ADMIN — ROPINIROLE HYDROCHLORIDE 0.5 MG: 0.25 TABLET, FILM COATED ORAL at 10:23

## 2025-03-23 ASSESSMENT — ACTIVITIES OF DAILY LIVING (ADL)
ADLS_ACUITY_SCORE: 44
ADLS_ACUITY_SCORE: 44
ADLS_ACUITY_SCORE: 52
ADLS_ACUITY_SCORE: 44
ADLS_ACUITY_SCORE: 52
ADLS_ACUITY_SCORE: 52
ADLS_ACUITY_SCORE: 44
ADLS_ACUITY_SCORE: 52
ADLS_ACUITY_SCORE: 44
ADLS_ACUITY_SCORE: 52
ADLS_ACUITY_SCORE: 44
ADLS_ACUITY_SCORE: 52
ADLS_ACUITY_SCORE: 52
ADLS_ACUITY_SCORE: 44
ADLS_ACUITY_SCORE: 52

## 2025-03-23 NOTE — PLAN OF CARE
"Goal Outcome Evaluation:    Pt is A/O x 4. C/o of BLE pain and schedule tylenol seems to help. Also c/o feeling off and keep asking to get her temp checked. O2 saturations were under 90 and put her on 2L of oxygen via NC. Slept soundly after. Dressing is C/D/I on RLE. LLE does not have lymphadema wraps. Purewick in place. Double lumen PICC line are saline locked and morning labs have been drawn.  Call light within reach.          Problem: Adult Inpatient Plan of Care  Goal: Plan of Care Review  Description: The Plan of Care Review/Shift note should be completed every shift.  The Outcome Evaluation is a brief statement about your assessment that the patient is improving, declining, or no change.  This information will be displayed automatically on your shift  note.  Outcome: Progressing  Goal: Patient-Specific Goal (Individualized)  Description: You can add care plan individualizations to a care plan. Examples of Individualization might be:  \"Parent requests to be called daily at 9am for status\", \"I have a hard time hearing out of my right ear\", or \"Do not touch me to wake me up as it startles  me\".  Outcome: Progressing  Goal: Absence of Hospital-Acquired Illness or Injury  Outcome: Progressing  Intervention: Identify and Manage Fall Risk  Recent Flowsheet Documentation  Taken 3/23/2025 0345 by Rebeca Quinn, HAROLDO  Safety Promotion/Fall Prevention:   activity supervised   clutter free environment maintained   room door open   room near nurse's station   room organization consistent   safety round/check completed  Intervention: Prevent Skin Injury  Recent Flowsheet Documentation  Taken 3/23/2025 0330 by Rebeca Quinn, RN  Body Position: position changed independently  Intervention: Prevent Infection  Recent Flowsheet Documentation  Taken 3/23/2025 0345 by Rebeca Quinn, RN  Infection Prevention: hand hygiene promoted  Goal: Optimal Comfort and Wellbeing  Outcome: Progressing  Goal: Readiness for Transition of Care  Outcome: " Progressing     Problem: Delirium  Goal: Optimal Coping  Outcome: Progressing  Goal: Improved Behavioral Control  Outcome: Progressing  Intervention: Minimize Safety Risk  Recent Flowsheet Documentation  Taken 3/23/2025 0345 by Rebeca Quinn RN  Enhanced Safety Measures: pain management  Goal: Improved Attention and Thought Clarity  Outcome: Progressing  Goal: Improved Sleep  Outcome: Progressing     Problem: Skin or Soft Tissue Infection  Goal: Absence of Infection Signs and Symptoms  Outcome: Progressing  Intervention: Minimize and Manage Infection Progression  Recent Flowsheet Documentation  Taken 3/23/2025 0345 by Rebeca Quinn RN  Infection Prevention: hand hygiene promoted  Isolation Precautions: contact precautions maintained     Problem: Pain Acute  Goal: Optimal Pain Control and Function  Outcome: Progressing  Intervention: Prevent or Manage Pain  Recent Flowsheet Documentation  Taken 3/23/2025 0345 by Rebeca Quinn RN  Medication Review/Management: medications reviewed     Problem: Skin Injury Risk Increased  Goal: Skin Health and Integrity  Outcome: Progressing  Intervention: Plan: Nurse Driven Intervention: Moisture Management  Recent Flowsheet Documentation  Taken 3/23/2025 0345 by Rebeca Quinn RN  Moisture Interventions: Urinary collection device  Intervention: Plan: Nurse Driven Intervention: Friction and Shear  Recent Flowsheet Documentation  Taken 3/23/2025 0345 by Rebeca Quinn RN  Friction/Shear Interventions: HOB 30 degrees or less  Intervention: Optimize Skin Protection  Recent Flowsheet Documentation  Taken 3/23/2025 0330 by Rebeca Quinn RN  Head of Bed (HOB) Positioning: HOB at 30 degrees     Problem: Anemia  Goal: Anemia Symptom Improvement  Outcome: Progressing  Intervention: Monitor and Manage Anemia  Recent Flowsheet Documentation  Taken 3/23/2025 0345 by Rebeca Quinn RN  Safety Promotion/Fall Prevention:   activity supervised   clutter free environment maintained   room door open   room near nurse's  station   room organization consistent   safety round/check completed

## 2025-03-23 NOTE — PLAN OF CARE
Problem: Adult Inpatient Plan of Care  Goal: Absence of Hospital-Acquired Illness or Injury  Intervention: Identify and Manage Fall Risk  Recent Flowsheet Documentation  Taken 3/22/2025 1625 by Catie Moon, RN  Safety Promotion/Fall Prevention:   activity supervised   clutter free environment maintained   lighting adjusted   mobility aid in reach   nonskid shoes/slippers when out of bed   patient and family education     Problem: Adult Inpatient Plan of Care  Goal: Optimal Comfort and Wellbeing  Intervention: Monitor Pain and Promote Comfort  Recent Flowsheet Documentation  Taken 3/22/2025 1625 by Catie Moon, RN  Pain Management Interventions: medication (see MAR)   Goal Outcome Evaluation:       Pt A&Ox4, was up in the chair most of the evening. Up with assist 1-2 and walker. Pain reported in LLE, RLE PRN dilaudid given and scheduled tylenol with relief. Lymphedema wraps removed on LLE. Dressing C/D/I on RLE. PICC C/D/I. Purewick in place. Makes needs known. Catie Moon, RN

## 2025-03-23 NOTE — PROGRESS NOTES
[facility]    Medicine Progress Note - Hospitalist Service    Date of Admission:  3/12/2025    Assessment & Plan   Elizabeth Kelley is a 79 year old female with history of recurrent right lower extremity cellulitis, chronic lower extremity lymphedema, and chronic venous stasis,  who is admitted for LEFT lower extremity cellulitis which is slow to improve.  Currently on IV vancomycin and ceftriaxone and ID anticipates p.o. antibiotics in 2 to 3 days.  Hospital course complicated by acute on chronic anemia, suspect multifactorial.  S/p 2 units PRBCs.        3/23 :     Not needing oxygen at this time  No new complaints  Hb stable at 7.4, continue PPI  cephalexin 1gm po tid and minocycline 100mg po bid x 7 days, after completion resume chronic suppression with minocycline 50mg po daily  -continue lymphedema wraps    Plan to discharge home once home health arranged, declining TCU      A/p :        Left Lower Extremity Cellulitis  Presents with LEFT lower extremity erythema, edema.  Admitted 1/12 - 1/18/2025 for RIGHT lower extremity cellulitis and sepsis.  She was treated with meropenem and vancomycin, then transitioned to doxycycline.  3 weeks ago started suppression with minocycline 50 mg daily   Had elevated procalcitonin, CRP and leukocytosis.  -> 153  Ultrasound negative for DVT.  ABIs wnl   CT scan of the left thigh shows no abscess and no osteomyelitis.   On Vanco since 3/12 - present, Zosyn 3/12 - 3/17, ceftriaxone since 3/17- present   ID now signed off with recommendations in for antibiotics upon discharge.  WOC RN is following      Acute on chronic anemia.  Likely multifactorial.   Some oozing from the RLE wounds but otherwise no obvious active bleeding.  Has h/o upper GIB due to PUD, recent colonoscopy was unremarkable   labs suggestive of DAYANARA, B12 and folate normal, no evidence of hemolysis  transfused  2 units  PRBCs on 3/18 and 3/20   PPI BID   EGD done on 03/21 reported with  "sizable hiatal hernia with Brian lesions.  Endoscopic intervention was deferred  Surgical repair would be definitive management ideally  Iron supplement daily   -Hemoglobin is 7.5 today     KIKE - improving, creatinine back to baseline   Monitor      Chronic Bilateral Lower Extremity Lymphedema, Chronic Venous Stasis  Increase Lasix to 60 mg oral daily  Continue spironolactone 25 mg oral daily.  Plan to titrate up by tomorrow  PT for lymphedema wraps   WOC consulted     Generalized weakness  PT/OT are recommending TCU, patient has been declining so far     Chronic stable conditions:   Restless leg syndrome - continue PTA ropinirole  Anxiety depression - continue PTA Wellbutrin and Celexa  Hyperlipidemia - continue PTA statin   GERD - continue PTA PPI  Obesity: BMI 48.             Diet: Combination Diet Regular Diet Adult    DVT Prophylaxis: VTE Prophylaxis contraindicated due to BL LE cellulitis and severe anemia  Amato Catheter: Not present  Lines: PRESENT      PICC 03/20/25 Double Lumen Left Basilic Vascular access-Site Assessment: WDL      Cardiac Monitoring: None  Code Status: Full Code      Clinically Significant Risk Factors               # Hypoalbuminemia: Lowest albumin = 2.2 g/dL at 3/18/2025  6:58 AM, will monitor as appropriate     # Hypertension: Noted on problem list            # Severe Obesity: Estimated body mass index is 49.94 kg/m  as calculated from the following:    Height as of this encounter: 1.524 m (5').    Weight as of this encounter: 116 kg (255 lb 11.7 oz).        # Financial/Environmental Concerns: none;other (see comments) (Significant other states, \"without me she would be living on the streets and be unable to afford food, but since I still work full time we do just fine. Her  left her and kicked her out of the house about 15 years ago\".)         Social Drivers of Health    Tobacco Use: Medium Risk (10/21/2024)    Patient History     Smoking Tobacco Use: Former     Smokeless " Tobacco Use: Never          Disposition Plan     Medically Ready for Discharge: Anticipated Tomorrow             Bryon Reza MD  Hospitalist Service  [facility]  Securely message with [employer] (more info)  Text page via [employer] Paging/Directory   ______________________________________________________________________      Physical Exam   Vital Signs: Temp: 98.3  F (36.8  C) Temp src: Oral BP: 124/61 Pulse: 71   Resp: 18 SpO2: 98 % O2 Device: Nasal cannula Oxygen Delivery: 2 LPM  Weight: 255 lbs 11.74 oz    General Appearance:  No distress noted  Respiratory: Good air entry bilaterally  Cardiovascular: S1 and S2 heard  GI: Soft abdomen, no tenderness, normoactive bowel sound  Skin: Bilateral lower extremity red and swollen with lymphedema wraps       Medical Decision Making       60 MINUTES SPENT BY ME on the date of service doing chart review, history, exam, documentation & further activities per the note.      Data

## 2025-03-23 NOTE — PLAN OF CARE
Problem: Adult Inpatient Plan of Care  Goal: Absence of Hospital-Acquired Illness or Injury  Intervention: Identify and Manage Fall Risk  Recent Flowsheet Documentation  Taken 3/23/2025 1100 by Malvin Reynolds RN  Safety Promotion/Fall Prevention:   activity supervised   clutter free environment maintained   room door open   room near nurse's station   room organization consistent   safety round/check completed  Intervention: Prevent Skin Injury  Recent Flowsheet Documentation  Taken 3/23/2025 1100 by Malvin Reynolds RN  Body Position: position changed independently  Intervention: Prevent Infection  Recent Flowsheet Documentation  Taken 3/23/2025 1100 by Malvin Reynolds RN  Infection Prevention: hand hygiene promoted  Goal: Optimal Comfort and Wellbeing  Outcome: Progressing     Problem: Delirium  Goal: Improved Behavioral Control  Intervention: Minimize Safety Risk  Recent Flowsheet Documentation  Taken 3/23/2025 1100 by Malvin Reynolds RN  Enhanced Safety Measures: pain management  Goal: Improved Attention and Thought Clarity  Intervention: Maximize Cognitive Function  Recent Flowsheet Documentation  Taken 3/23/2025 1100 by Malvin Reynolds RN  Sensory Stimulation Regulation:   care clustered   lighting decreased  Reorientation Measures:   calendar in view   clock in view     Problem: Skin or Soft Tissue Infection  Goal: Absence of Infection Signs and Symptoms  Intervention: Minimize and Manage Infection Progression  Recent Flowsheet Documentation  Taken 3/23/2025 1100 by Malvin Reynolds RN  Infection Prevention: hand hygiene promoted  Isolation Precautions: contact precautions maintained     Problem: Pain Acute  Goal: Optimal Pain Control and Function  Outcome: Progressing  Intervention: Prevent or Manage Pain  Recent Flowsheet Documentation  Taken 3/23/2025 1100 by Malvin Reynolds RN  Sensory Stimulation Regulation:   care clustered   lighting decreased  Medication Review/Management: medications  reviewed     Problem: Skin Injury Risk Increased  Goal: Skin Health and Integrity  Outcome: Progressing  Intervention: Plan: Nurse Driven Intervention: Moisture Management  Recent Flowsheet Documentation  Taken 3/23/2025 1100 by Malvin Reynolds RN  Moisture Interventions: Urinary collection device  Intervention: Plan: Nurse Driven Intervention: Friction and Shear  Recent Flowsheet Documentation  Taken 3/23/2025 1100 by Malvin Reynolds RN  Friction/Shear Interventions: HOB 30 degrees or less  Intervention: Optimize Skin Protection  Recent Flowsheet Documentation  Taken 3/23/2025 1100 by Malvin Reynolds RN  Head of Bed (HOB) Positioning: HOB at 20-30 degrees     Problem: Anemia  Goal: Anemia Symptom Improvement  Intervention: Monitor and Manage Anemia  Recent Flowsheet Documentation  Taken 3/23/2025 1100 by Malvin Reynolds RN  Safety Promotion/Fall Prevention:   activity supervised   clutter free environment maintained   room door open   room near nurse's station   room organization consistent   safety round/check completed   Goal Outcome Evaluation:       Patient is A/O x4. VSS. She reports moderate-severe levels of pain to BLE. Patient had BM this AM. PT/OT contacted to determine when they will eval and treat for lymphedema per patient. Therapy is scheduled to do this task tomorrow. Patient likely to discharge home with home care after lymph wrap orders placed.

## 2025-03-24 ENCOUNTER — APPOINTMENT (OUTPATIENT)
Dept: OCCUPATIONAL THERAPY | Facility: HOSPITAL | Age: 80
End: 2025-03-24
Payer: COMMERCIAL

## 2025-03-24 LAB
HGB BLD-MCNC: 7.8 G/DL (ref 11.7–15.7)
PLATELET # BLD AUTO: 573 10E3/UL (ref 150–450)

## 2025-03-24 PROCEDURE — 250N000013 HC RX MED GY IP 250 OP 250 PS 637: Performed by: PHYSICIAN ASSISTANT

## 2025-03-24 PROCEDURE — 97535 SELF CARE MNGMENT TRAINING: CPT | Mod: GO

## 2025-03-24 PROCEDURE — 250N000013 HC RX MED GY IP 250 OP 250 PS 637: Performed by: INTERNAL MEDICINE

## 2025-03-24 PROCEDURE — 250N000011 HC RX IP 250 OP 636: Mod: JZ | Performed by: NURSE PRACTITIONER

## 2025-03-24 PROCEDURE — 99232 SBSQ HOSP IP/OBS MODERATE 35: CPT | Performed by: INTERNAL MEDICINE

## 2025-03-24 PROCEDURE — 250N000013 HC RX MED GY IP 250 OP 250 PS 637

## 2025-03-24 PROCEDURE — 120N000001 HC R&B MED SURG/OB

## 2025-03-24 PROCEDURE — 85049 AUTOMATED PLATELET COUNT: CPT | Performed by: INTERNAL MEDICINE

## 2025-03-24 PROCEDURE — 85018 HEMOGLOBIN: CPT | Performed by: INTERNAL MEDICINE

## 2025-03-24 PROCEDURE — 250N000013 HC RX MED GY IP 250 OP 250 PS 637: Performed by: STUDENT IN AN ORGANIZED HEALTH CARE EDUCATION/TRAINING PROGRAM

## 2025-03-24 PROCEDURE — 250N000013 HC RX MED GY IP 250 OP 250 PS 637: Performed by: NURSE PRACTITIONER

## 2025-03-24 RX ORDER — PANTOPRAZOLE SODIUM 40 MG/1
40 TABLET, DELAYED RELEASE ORAL 2 TIMES DAILY
Qty: 60 TABLET | Refills: 1 | Status: SHIPPED | OUTPATIENT
Start: 2025-03-24

## 2025-03-24 RX ORDER — MINOCYCLINE HYDROCHLORIDE 50 MG/1
50 TABLET ORAL DAILY
Qty: 90 TABLET | Refills: 3 | Status: ON HOLD | OUTPATIENT
Start: 2025-03-30 | End: 2025-03-29

## 2025-03-24 RX ORDER — CEPHALEXIN 500 MG/1
1000 CAPSULE ORAL EVERY 8 HOURS
Qty: 36 CAPSULE | Refills: 0 | Status: SHIPPED | OUTPATIENT
Start: 2025-03-24 | End: 2025-03-25

## 2025-03-24 RX ORDER — MINOCYCLINE HYDROCHLORIDE 100 MG/1
100 CAPSULE ORAL EVERY 12 HOURS
Qty: 11 CAPSULE | Refills: 0 | Status: SHIPPED | OUTPATIENT
Start: 2025-03-24 | End: 2025-03-25

## 2025-03-24 RX ORDER — MINOCYCLINE HYDROCHLORIDE 50 MG/1
50 TABLET ORAL DAILY
Qty: 90 TABLET | Refills: 3 | Status: SHIPPED | OUTPATIENT
Start: 2025-03-30 | End: 2025-03-24

## 2025-03-24 RX ORDER — FUROSEMIDE 20 MG/1
60 TABLET ORAL DAILY
Qty: 30 TABLET | Refills: 1 | Status: SHIPPED | OUTPATIENT
Start: 2025-03-24

## 2025-03-24 RX ADMIN — ROPINIROLE HYDROCHLORIDE 1 MG: 1 TABLET, FILM COATED ORAL at 20:33

## 2025-03-24 RX ADMIN — ANORECTAL OINTMENT: 15.7; .44; 24; 20.6 OINTMENT TOPICAL at 11:12

## 2025-03-24 RX ADMIN — MINOCYCLINE HYDROCHLORIDE 100 MG: 100 CAPSULE ORAL at 08:47

## 2025-03-24 RX ADMIN — ACETAMINOPHEN 650 MG: 325 TABLET ORAL at 08:47

## 2025-03-24 RX ADMIN — ROPINIROLE HYDROCHLORIDE 0.5 MG: 0.25 TABLET, FILM COATED ORAL at 08:47

## 2025-03-24 RX ADMIN — PANTOPRAZOLE SODIUM 40 MG: 40 TABLET, DELAYED RELEASE ORAL at 16:35

## 2025-03-24 RX ADMIN — GABAPENTIN 200 MG: 100 CAPSULE ORAL at 08:47

## 2025-03-24 RX ADMIN — Medication 60 ML: at 08:50

## 2025-03-24 RX ADMIN — SPIRONOLACTONE 25 MG: 25 TABLET, FILM COATED ORAL at 08:47

## 2025-03-24 RX ADMIN — GABAPENTIN 200 MG: 100 CAPSULE ORAL at 20:32

## 2025-03-24 RX ADMIN — ROPINIROLE HYDROCHLORIDE 0.5 MG: 0.25 TABLET, FILM COATED ORAL at 16:35

## 2025-03-24 RX ADMIN — ACETAMINOPHEN 650 MG: 325 TABLET ORAL at 16:35

## 2025-03-24 RX ADMIN — CEPHALEXIN 1000 MG: 500 CAPSULE ORAL at 14:21

## 2025-03-24 RX ADMIN — FERROUS SULFATE TAB 325 MG (65 MG ELEMENTAL FE) 325 MG: 325 (65 FE) TAB at 08:47

## 2025-03-24 RX ADMIN — ACETAMINOPHEN 650 MG: 325 TABLET ORAL at 04:02

## 2025-03-24 RX ADMIN — CITALOPRAM HYDROBROMIDE 40 MG: 40 TABLET, FILM COATED ORAL at 12:04

## 2025-03-24 RX ADMIN — SIMVASTATIN 40 MG: 40 TABLET, FILM COATED ORAL at 20:33

## 2025-03-24 RX ADMIN — MINOCYCLINE HYDROCHLORIDE 100 MG: 100 CAPSULE ORAL at 20:32

## 2025-03-24 RX ADMIN — BUPROPION HYDROCHLORIDE 150 MG: 150 TABLET, FILM COATED, EXTENDED RELEASE ORAL at 08:47

## 2025-03-24 RX ADMIN — ACETAMINOPHEN 650 MG: 325 TABLET ORAL at 23:37

## 2025-03-24 RX ADMIN — CEPHALEXIN 1000 MG: 500 CAPSULE ORAL at 06:34

## 2025-03-24 RX ADMIN — PANTOPRAZOLE SODIUM 40 MG: 40 TABLET, DELAYED RELEASE ORAL at 06:35

## 2025-03-24 RX ADMIN — HYDROMORPHONE HYDROCHLORIDE 0.5 MG: 1 INJECTION, SOLUTION INTRAMUSCULAR; INTRAVENOUS; SUBCUTANEOUS at 04:02

## 2025-03-24 RX ADMIN — CEPHALEXIN 1000 MG: 500 CAPSULE ORAL at 20:32

## 2025-03-24 RX ADMIN — ACETAMINOPHEN 650 MG: 325 TABLET ORAL at 20:32

## 2025-03-24 RX ADMIN — HYDROMORPHONE HYDROCHLORIDE 0.5 MG: 1 INJECTION, SOLUTION INTRAMUSCULAR; INTRAVENOUS; SUBCUTANEOUS at 12:05

## 2025-03-24 RX ADMIN — ACETAMINOPHEN 650 MG: 325 TABLET ORAL at 12:04

## 2025-03-24 RX ADMIN — FUROSEMIDE 60 MG: 20 TABLET ORAL at 08:47

## 2025-03-24 ASSESSMENT — ACTIVITIES OF DAILY LIVING (ADL)
ADLS_ACUITY_SCORE: 48
ADLS_ACUITY_SCORE: 44
ADLS_ACUITY_SCORE: 48
ADLS_ACUITY_SCORE: 48
ADLS_ACUITY_SCORE: 44
ADLS_ACUITY_SCORE: 48
ADLS_ACUITY_SCORE: 44
ADLS_ACUITY_SCORE: 44
ADLS_ACUITY_SCORE: 48
ADLS_ACUITY_SCORE: 48
ADLS_ACUITY_SCORE: 44
ADLS_ACUITY_SCORE: 48
ADLS_ACUITY_SCORE: 44
ADLS_ACUITY_SCORE: 48
ADLS_ACUITY_SCORE: 44
ADLS_ACUITY_SCORE: 44
ADLS_ACUITY_SCORE: 46
ADLS_ACUITY_SCORE: 44
ADLS_ACUITY_SCORE: 44

## 2025-03-24 NOTE — PLAN OF CARE
Problem: Skin or Soft Tissue Infection  Goal: Absence of Infection Signs and Symptoms  Outcome: Progressing  Intervention: Minimize and Manage Infection Progression  Recent Flowsheet Documentation  Taken 3/24/2025 0253 by Geno Richards RN  Infection Prevention:   hand hygiene promoted   rest/sleep promoted   single patient room provided  Taken 3/23/2025 2101 by Geno Richards RN  Infection Prevention:   hand hygiene promoted   rest/sleep promoted   single patient room provided     Problem: Pain Acute  Goal: Optimal Pain Control and Function  Outcome: Progressing  Intervention: Prevent or Manage Pain  Recent Flowsheet Documentation  Taken 3/24/2025 0253 by Geno Richards RN  Medication Review/Management: medications reviewed  Taken 3/23/2025 2101 by Geno Richards RN  Medication Review/Management: medications reviewed   Goal Outcome Evaluation:       Patient A/O x4. VSS on RA. Reports LLE pain managed with scheduled and PRN medications. 1 dose of IV dilaudid given overnight. Right leg dressing is weeping through dressing reinforced overnight, patient declined dressing change since she will be getting a full dressing change and lymph-wraps done today. PICC patent. External catheter in place. Call light within reach and bed alarm activated.

## 2025-03-24 NOTE — PLAN OF CARE
Problem: Pain Acute  Goal: Optimal Pain Control and Function  Intervention: Prevent or Manage Pain  Recent Flowsheet Documentation  Taken 3/24/2025 1120 by Jayden Hartley RN  Medication Review/Management: medications reviewed     Problem: Anemia  Goal: Anemia Symptom Improvement  Outcome: Progressing  Intervention: Monitor and Manage Anemia  Recent Flowsheet Documentation  Taken 3/24/2025 1120 by Jayden Hartley, RN  Safety Promotion/Fall Prevention:   activity supervised   clutter free environment maintained   room door open   room near nurse's station   room organization consistent   safety round/check completed      Problem: Skin or Soft Tissue Infection  Goal: Absence of Infection Signs and Symptoms  Outcome: Progressing  Intervention: Minimize and Manage Infection Progression  Recent Flowsheet Documentation  Taken 3/24/2025 1120 by Jayden Hartley, RN  Infection Prevention:   hand hygiene promoted   single patient room provided  Isolation Precautions: contact precautions discontinued   Goal Outcome Evaluation:      Plan of Care Reviewed With: patient    Overall Patient Progress: improvingOverall Patient Progress: improving     A&Ox4, able to use call light and express needs to staff. C/O 7/10 pain in BLE which was managed with PRN and scheduled medication. Has had a good appetite today. Wound care done to RLE per order. Hgb has improved today. Ax1-2 w/walker and GB. No acute changes. Had a slightly elevated temp this morning otherwise VSS on RA.

## 2025-03-24 NOTE — PROGRESS NOTES
"Care Management Discharge Note    Discharge Date: 03/24/2025       Discharge Disposition: Home Care    Discharge Services: None    Discharge DME: None    Discharge Transportation: family or friend will provide    Private pay costs discussed: Not applicable    Does the patient's insurance plan have a 3 day qualifying hospital stay waiver?  No    PAS Confirmation Code:    Patient/family educated on Medicare website which has current facility and service quality ratings: yes    Education Provided on the Discharge Plan: Yes  Persons Notified of Discharge Plans: Yes  Patient/Family in Agreement with the Plan: yes    Handoff Referral Completed: Yes, [facility] PCP: Internal handoff referral completed    Additional Information:    Final discharge plan is for pt to return home with OP follow up. Pt will have [facility] in [place] for home RN/OT/PT/HHA, orders faxed to home care fax #[phone]. Family to transport.       Writer went and spoke with pt, \" She does not want to go until she's talked with hosp and has questions on her fevers. \" Writer updated [facility] and he plans to talk with her.         2:50pm- RNCM spoke to [facility] they cannot take pt on now stating, \"pt is too complexed. \" Writer explained to them that the wound isnt any different than prior, and that PT recs home care. They were still not agreeable.       RNCM sent a referral for home RN/PT/OT/HHA (pending).       2:53pm- Writer spoke with pt, \"she does not feel comfortable leaving until home care is established. She also is concerned about her temp being 99.2 this AM, stating \"That temp is not normal for me and I worry if I go home too early I will be right back with another fever.\"  Writer paged Hosp regarding and bedside RN.       Per Hosp, \"ok with pt staying another night.\" Writer updated bedside RN and he plans to tell pt.       Natalya Nash RN      "

## 2025-03-24 NOTE — PROGRESS NOTES
This patient no longer requires Contact Precautions because the patient met MDRO discontinuation guidelines..    March 24, 2025  Brianna Claire, Infection Prevention

## 2025-03-24 NOTE — PLAN OF CARE
Problem: Adult Inpatient Plan of Care  Goal: Absence of Hospital-Acquired Illness or Injury  Intervention: Identify and Manage Fall Risk  Recent Flowsheet Documentation  Taken 3/23/2025 1630 by Malvin Reynolds RN  Safety Promotion/Fall Prevention:   activity supervised   clutter free environment maintained   room door open   room near nurse's station   room organization consistent   safety round/check completed  Taken 3/23/2025 1100 by Malvin Reynolds RN  Safety Promotion/Fall Prevention:   activity supervised   clutter free environment maintained   room door open   room near nurse's station   room organization consistent   safety round/check completed  Intervention: Prevent Skin Injury  Recent Flowsheet Documentation  Taken 3/23/2025 1750 by Malvin Reynolds RN  Body Position: refuses positioning  Taken 3/23/2025 1630 by Malvin Reynolds RN  Body Position: position changed independently  Taken 3/23/2025 1350 by Malvin Reynolds RN  Body Position: heels elevated  Taken 3/23/2025 1100 by Malvin Reynolds RN  Body Position: position changed independently  Intervention: Prevent Infection  Recent Flowsheet Documentation  Taken 3/23/2025 1630 by Malvin Reynolds RN  Infection Prevention: hand hygiene promoted  Taken 3/23/2025 1100 by Malvin Reynolds RN  Infection Prevention: hand hygiene promoted  Goal: Optimal Comfort and Wellbeing  3/23/2025 2000 by Malvin Reynolds RN  Outcome: Progressing  3/23/2025 1647 by Malvin Reynolds RN  Outcome: Progressing     Problem: Delirium  Goal: Improved Behavioral Control  Intervention: Minimize Safety Risk  Recent Flowsheet Documentation  Taken 3/23/2025 1630 by Malvin Reynolds, RN  Enhanced Safety Measures: pain management  Taken 3/23/2025 1100 by Malvin Reynolds RN  Enhanced Safety Measures: pain management  Goal: Improved Attention and Thought Clarity  Intervention: Maximize Cognitive Function  Recent Flowsheet Documentation  Taken 3/23/2025 1630 by Malvin Reynolds  RN  Sensory Stimulation Regulation:   care clustered   lighting decreased  Reorientation Measures:   calendar in view   clock in view  Taken 3/23/2025 1100 by Malvin Reynolds RN  Sensory Stimulation Regulation:   care clustered   lighting decreased  Reorientation Measures:   calendar in view   clock in view     Problem: Skin or Soft Tissue Infection  Goal: Absence of Infection Signs and Symptoms  Intervention: Minimize and Manage Infection Progression  Recent Flowsheet Documentation  Taken 3/23/2025 1630 by Malvin Reynolds RN  Infection Prevention: hand hygiene promoted  Isolation Precautions: contact precautions maintained  Taken 3/23/2025 1100 by Malvin Reynolds RN  Infection Prevention: hand hygiene promoted  Isolation Precautions: contact precautions maintained     Problem: Pain Acute  Goal: Optimal Pain Control and Function  3/23/2025 2000 by Malvin Reynolds RN  Outcome: Progressing  3/23/2025 1647 by Malvin Reynolds RN  Outcome: Progressing  Intervention: Prevent or Manage Pain  Recent Flowsheet Documentation  Taken 3/23/2025 1630 by Malvin Reynolds RN  Sensory Stimulation Regulation:   care clustered   lighting decreased  Medication Review/Management: medications reviewed  Taken 3/23/2025 1100 by Malvin Reynolds RN  Sensory Stimulation Regulation:   care clustered   lighting decreased  Medication Review/Management: medications reviewed     Problem: Skin Injury Risk Increased  Goal: Skin Health and Integrity  3/23/2025 2000 by Malvin Reynolds RN  Outcome: Progressing  3/23/2025 1647 by Malvin Reynolds RN  Outcome: Progressing  Intervention: Plan: Nurse Driven Intervention: Moisture Management  Recent Flowsheet Documentation  Taken 3/23/2025 1630 by Malvin Reynolds RN  Moisture Interventions: Urinary collection device  Taken 3/23/2025 1100 by Malvin Reynolds RN  Moisture Interventions: Urinary collection device  Intervention: Plan: Nurse Driven Intervention: Friction and Shear  Recent Flowsheet  Documentation  Taken 3/23/2025 1630 by Malvin Reynolds, RN  Friction/Shear Interventions: HOB 30 degrees or less  Taken 3/23/2025 1100 by Malvin Reynolds RN  Friction/Shear Interventions: HOB 30 degrees or less  Intervention: Optimize Skin Protection  Recent Flowsheet Documentation  Taken 3/23/2025 1630 by Malvin Reynolds, RN  Head of Bed (HOB) Positioning: HOB at 20-30 degrees  Taken 3/23/2025 1100 by Malvin Reynolds RN  Head of Bed (HOB) Positioning: HOB at 20-30 degrees     Problem: Anemia  Goal: Anemia Symptom Improvement  Intervention: Monitor and Manage Anemia  Recent Flowsheet Documentation  Taken 3/23/2025 1630 by Malvin Reynolds RN  Safety Promotion/Fall Prevention:   activity supervised   clutter free environment maintained   room door open   room near nurse's station   room organization consistent   safety round/check completed  Taken 3/23/2025 1100 by Malvin Reynolds RN  Safety Promotion/Fall Prevention:   activity supervised   clutter free environment maintained   room door open   room near nurse's station   room organization consistent   safety round/check completed   Goal Outcome Evaluation:       Patient is A/O x4. VSS. Patient reporting pain between 4-8 of 10 to BLE, treated with PO dilaudid and scheduled acetaminophen. RLE soaked through gauze, but was unable to be change by time of writing this while awaiting the supplies to be delivered to unit. Evening staff informed of plan of care to change wound dressing on RLE per orders.

## 2025-03-25 VITALS
RESPIRATION RATE: 18 BRPM | BODY MASS INDEX: 50.21 KG/M2 | SYSTOLIC BLOOD PRESSURE: 122 MMHG | HEART RATE: 73 BPM | OXYGEN SATURATION: 94 % | DIASTOLIC BLOOD PRESSURE: 56 MMHG | TEMPERATURE: 98.3 F | HEIGHT: 60 IN | WEIGHT: 255.73 LBS

## 2025-03-25 LAB
BASOPHILS # BLD AUTO: 0.1 10E3/UL (ref 0–0.2)
BASOPHILS NFR BLD AUTO: 1 %
CRP SERPL-MCNC: 150.2 MG/L
EOSINOPHIL # BLD AUTO: 0.2 10E3/UL (ref 0–0.7)
EOSINOPHIL NFR BLD AUTO: 2 %
ERYTHROCYTE [DISTWIDTH] IN BLOOD BY AUTOMATED COUNT: 18.9 % (ref 10–15)
HCT VFR BLD AUTO: 28.1 % (ref 35–47)
HGB BLD-MCNC: 8.3 G/DL (ref 11.7–15.7)
IMM GRANULOCYTES # BLD: 0.1 10E3/UL
IMM GRANULOCYTES NFR BLD: 1 %
LYMPHOCYTES # BLD AUTO: 1.3 10E3/UL (ref 0.8–5.3)
LYMPHOCYTES NFR BLD AUTO: 16 %
MCH RBC QN AUTO: 24.6 PG (ref 26.5–33)
MCHC RBC AUTO-ENTMCNC: 29.5 G/DL (ref 31.5–36.5)
MCV RBC AUTO: 83 FL (ref 78–100)
MONOCYTES # BLD AUTO: 1.3 10E3/UL (ref 0–1.3)
MONOCYTES NFR BLD AUTO: 15 %
NEUTROPHILS # BLD AUTO: 5.4 10E3/UL (ref 1.6–8.3)
NEUTROPHILS NFR BLD AUTO: 65 %
NRBC # BLD AUTO: 0 10E3/UL
NRBC BLD AUTO-RTO: 0 /100
PLATELET # BLD AUTO: 639 10E3/UL (ref 150–450)
RBC # BLD AUTO: 3.37 10E6/UL (ref 3.8–5.2)
WBC # BLD AUTO: 8.3 10E3/UL (ref 4–11)

## 2025-03-25 PROCEDURE — 250N000013 HC RX MED GY IP 250 OP 250 PS 637: Performed by: NURSE PRACTITIONER

## 2025-03-25 PROCEDURE — 85025 COMPLETE CBC W/AUTO DIFF WBC: CPT | Performed by: INTERNAL MEDICINE

## 2025-03-25 PROCEDURE — 250N000013 HC RX MED GY IP 250 OP 250 PS 637: Performed by: PHYSICIAN ASSISTANT

## 2025-03-25 PROCEDURE — 250N000013 HC RX MED GY IP 250 OP 250 PS 637: Performed by: INTERNAL MEDICINE

## 2025-03-25 PROCEDURE — 999N000197 HC STATISTIC WOC PT EDUCATION, 0-15 MIN

## 2025-03-25 PROCEDURE — 86140 C-REACTIVE PROTEIN: CPT | Performed by: INTERNAL MEDICINE

## 2025-03-25 PROCEDURE — 250N000013 HC RX MED GY IP 250 OP 250 PS 637

## 2025-03-25 PROCEDURE — 250N000013 HC RX MED GY IP 250 OP 250 PS 637: Performed by: STUDENT IN AN ORGANIZED HEALTH CARE EDUCATION/TRAINING PROGRAM

## 2025-03-25 RX ORDER — MINOCYCLINE HYDROCHLORIDE 100 MG/1
100 CAPSULE ORAL EVERY 12 HOURS
Qty: 9 CAPSULE | Refills: 0 | Status: ON HOLD | OUTPATIENT
Start: 2025-03-25 | End: 2025-03-29

## 2025-03-25 RX ORDER — CEPHALEXIN 500 MG/1
1000 CAPSULE ORAL EVERY 8 HOURS
Qty: 30 CAPSULE | Refills: 0 | Status: ON HOLD | OUTPATIENT
Start: 2025-03-25 | End: 2025-03-29

## 2025-03-25 RX ADMIN — Medication 60 ML: at 08:27

## 2025-03-25 RX ADMIN — ACETAMINOPHEN 650 MG: 325 TABLET ORAL at 11:46

## 2025-03-25 RX ADMIN — ACETAMINOPHEN 650 MG: 325 TABLET ORAL at 05:08

## 2025-03-25 RX ADMIN — ROPINIROLE HYDROCHLORIDE 0.5 MG: 0.25 TABLET, FILM COATED ORAL at 08:22

## 2025-03-25 RX ADMIN — PANTOPRAZOLE SODIUM 40 MG: 40 TABLET, DELAYED RELEASE ORAL at 05:08

## 2025-03-25 RX ADMIN — CITALOPRAM HYDROBROMIDE 40 MG: 40 TABLET, FILM COATED ORAL at 11:46

## 2025-03-25 RX ADMIN — ANORECTAL OINTMENT: 15.7; .44; 24; 20.6 OINTMENT TOPICAL at 10:00

## 2025-03-25 RX ADMIN — FERROUS SULFATE TAB 325 MG (65 MG ELEMENTAL FE) 325 MG: 325 (65 FE) TAB at 08:22

## 2025-03-25 RX ADMIN — MINOCYCLINE HYDROCHLORIDE 100 MG: 100 CAPSULE ORAL at 08:22

## 2025-03-25 RX ADMIN — SPIRONOLACTONE 25 MG: 25 TABLET, FILM COATED ORAL at 08:22

## 2025-03-25 RX ADMIN — FUROSEMIDE 60 MG: 20 TABLET ORAL at 08:21

## 2025-03-25 RX ADMIN — CEPHALEXIN 1000 MG: 500 CAPSULE ORAL at 05:09

## 2025-03-25 RX ADMIN — CEPHALEXIN 1000 MG: 500 CAPSULE ORAL at 14:04

## 2025-03-25 RX ADMIN — ACETAMINOPHEN 650 MG: 325 TABLET ORAL at 08:22

## 2025-03-25 RX ADMIN — BUPROPION HYDROCHLORIDE 150 MG: 150 TABLET, FILM COATED, EXTENDED RELEASE ORAL at 08:22

## 2025-03-25 RX ADMIN — GABAPENTIN 200 MG: 100 CAPSULE ORAL at 08:22

## 2025-03-25 ASSESSMENT — ACTIVITIES OF DAILY LIVING (ADL)
ADLS_ACUITY_SCORE: 50
ADLS_ACUITY_SCORE: 46
ADLS_ACUITY_SCORE: 52
ADLS_ACUITY_SCORE: 48
ADLS_ACUITY_SCORE: 50
ADLS_ACUITY_SCORE: 50
ADLS_ACUITY_SCORE: 46
ADLS_ACUITY_SCORE: 52
ADLS_ACUITY_SCORE: 46
ADLS_ACUITY_SCORE: 50
ADLS_ACUITY_SCORE: 50

## 2025-03-25 NOTE — PLAN OF CARE
Problem: Anemia  Goal: Anemia Symptom Improvement  Outcome: Progressing  Intervention: Monitor and Manage Anemia  Recent Flowsheet Documentation  Taken 3/25/2025 0940 by Jayden Hartley, RN  Safety Promotion/Fall Prevention:   activity supervised   clutter free environment maintained   room door open   room near nurse's station   room organization consistent   safety round/check completed     Problem: Skin or Soft Tissue Infection  Goal: Absence of Infection Signs and Symptoms  Outcome: Progressing  Intervention: Minimize and Manage Infection Progression  Recent Flowsheet Documentation  Taken 3/25/2025 0940 by Jayden Hartley, RN  Infection Prevention:   hand hygiene promoted   single patient room provided   Goal Outcome Evaluation:      Plan of Care Reviewed With: patient    Overall Patient Progress: improvingOverall Patient Progress: improving     A&Ox4, able to express needs to staff. C/O 7/10 pain which is well managed with scheduled and PRN medication. Dressing to RLE reinforced d/t serosanguinous drainage. PICC line removed per order and occlusive pressure dressing applied. Will discharge this afternoon to home with home health. VSS on RA.

## 2025-03-25 NOTE — PROGRESS NOTES
[facility]  WO Nurse Inpatient Assessment     Consulted for: right and lower extremities    Summary: 3/25 Patient is discharging home today, wound care done yesterday.  Patient states it is going well and is having a little less pain in right leg.  Patient asked writer to clarify orders.  Vashe moistened Kerlix should be used to clean wound and then removed prior to Polymem applied to leg.  Those clarifications made to wound care order.     ALBARO Da SilvaN RN CWOCN  Pager no longer in use, please contact through [employer] group: [facility]      3/20 WO checked in with patient.  Patient stated leg pain decreased over the past couple of days and she seems to be happy with new type of dressing.  The new dressing lasted almost 48 hours without strike through and patient was happy about not having to do daily changes. She is able to sleep, eat and function without the pain being overwhelming, she knows to expect some pain given the wseverity of wound but currently more tolerable than the past few days.  WO team will again follow up with her next week to see status of wound and how the dressings are doing.     3/18: patient was evaluated in the am, wound care materials were ordered, woc team went back in the afternoon to complete cares when products arrived.     3/13 Patient had left leg wrapped by Lymphedema team prior to [facility] arrival.  Patient known to WO team, notes from previous admissions available in Epic.  Sees home care x3 a week and does wound care independently on other days.  Right leg had signs of pseudomonas including green drainage and sweet, yeast like odor.  Vashe wound cleanser should get rid of pseudomonas.    [facility] nurse follow-up plan: weekly    Patient History (according to provider note(s):       Elizabeth Kelley is a 79 year old female with PMHx of recurrent right lower extremity cellulitis, chronic lower extremity lymphedema, chronic venous stasis, chronic  anemia, RLS, HLD, GERD who was admitted on 3/12/2025. She has a history of recurrent hospitalizations for right lower extremity cellulitis. Patient recently admitted 1/12 - 1/18/2025 for RIGHT lower extremity cellulitis and sepsis.  Had been treated with meropenem and vancomycin, ID was consulted and she was discharged on doxycycline.  She presented to the ER today for evaluation of LEFT lower extremity erythema, edema, pain and admitted for left lower extremity cellulitis.     Assessment:      Areas visualized during today's visit: Lower extremities     Skin Injury Location: right leg            Green drainage                                               front                                                                 lateral                                                               medial          3/18    Last photo: 3/18/2025  Skin injury due to: Unclear etiology and cellulitis  Skin history and plan of care:   see chart  Affected area:      Skin assessment: Denudement, Edema, and Maceration     Measurements (length x width x depth, in cm) circumference of leg from knee to ankle     Color: normal and consistent with surrounding tissue     Temperature  normal      Drainage: copious .      Color: creamy      Odor: strong and sweet  Pain: moderate, sharp  Pain interventions prior to dressing change: slow and gentle cares   Treatment goal: Drainage control, Decrease moisture, and Protection  STATUS: evolving  Supplies ordered: ordered polymem from [facility]     Patient was seen this am with both nurse and MD at bedside to reeval. Oil emulsion dressings were used instead of the xeroform that was ordered and patient had stated that they were scrubbed off this morning. Patient was placed in a soaking vashe wrap and supplies were ordered from [facility], [facility] nurse returned and placed new dressing.    Treatment Plan:     Right leg wound(s): Every other day can do wound cares on Lymphedema schedule if wrapping leg  Moisten  "4\" roll of Kerlix with Vashe, wring out excess.  Wrap leg from knee to ankle and let soak for 15-20 minutes  Cover wound with Polymem roll (#[id number]) - WRITING AWAY FROM WOUND, secure with kerlix roll  Cover Calf with 2 large Mextra under knee with medium size to fill in gap and two medium around ankle.  Secure with 2 rolls of dry 4\" Kerlix from toes to knees, place pillow under calf and protect pillow with Chux     Call stores for Mextra if out Large PS# [id number], medium PS# [id number]      Orders: Written    RECOMMEND PRIMARY TEAM ORDER: None, at this time  Education provided: plan of care  Discussed plan of care with: Patient and Nurse  Notify WO if wound(s) deteriorate.  Nursing to notify the Provider(s) and re-consult the [facility] Nurse if new skin concern.    DATA:     Current support surface: Standard  Standard gel mattress (Isoflex)  Containment of urine/stool: Continent of bladder and Continent of bowel  BMI: Body mass index is 49.94 kg/m .   Active diet order: Orders Placed This Encounter      Combination Diet Regular Diet Adult      Diet     Output: I/O last 3 completed shifts:  In: 990 [P.O.:990]  Out: 2850 [Urine:2850]     Labs:   Recent Labs   Lab 03/25/25  0636 03/21/25  0553 03/20/25  0640   HGB 8.3*   < > 6.7*   WBC 8.3   < > 12.1*   A1C  --   --  6.0*    < > = values in this interval not displayed.     Pressure injury risk assessment:   Sensory Perception: 3-->slightly limited  Moisture: 3-->occasionally moist  Activity: 3-->walks occasionally  Mobility: 3-->slightly limited  Nutrition: 3-->adequate  Friction and Shear: 2-->potential problem  Ajith Score: 17      Pager no longer in use, please contact through [employer] group: [facility]      "

## 2025-03-25 NOTE — PLAN OF CARE
Physical Therapy Discharge Summary    Reason for therapy discharge:    Discharged to home with home therapy.    Progress towards therapy goal(s). See goals on Care Plan in [facility] electronic health record for goal details.  Goals partially met.  Barriers to achieving goals:   Pt was working on the goals.    Therapy recommendation(s):    Continued therapy is recommended.  Rationale/Recommendations:  To improve and strength.  .

## 2025-03-25 NOTE — PLAN OF CARE
Occupational Therapy Discharge Summary    Reason for therapy discharge:    Discharged to home with home therapy.    Progress towards therapy goal(s). See goals on Care Plan in [facility] electronic health record for goal details.  Goals partially met.  Barriers to achieving goals:   discharge from facility.    Therapy recommendation(s):    Continued therapy is recommended.  Rationale/Recommendations:  to improve ADL independence.    Lymphedema Discharge Summary    Reason for therapy discharge:    Discharged to home.    Progress towards therapy goal(s). See goals on Care Plan in [facility] electronic health record for goal details.  Goals partially met.  Barriers to achieving goals:   discharge from facility.    Therapy recommendation(s):    Continued therapy is recommended.  Rationale/Recommendations:  Lymphedema therapy to improve edema and wound management.

## 2025-03-25 NOTE — PLAN OF CARE
"  Problem: Adult Inpatient Plan of Care  Goal: Absence of Hospital-Acquired Illness or Injury  Intervention: Identify and Manage Fall Risk  Recent Flowsheet Documentation  Taken 3/25/2025 0025 by Gabriele Rodriguez RN  Safety Promotion/Fall Prevention:   activity supervised   assistive device/personal items within reach   lighting adjusted   mobility aid in reach   nonskid shoes/slippers when out of bed   room door open   room near nurse's station   room organization consistent   safety round/check completed   supervised activity  Taken 3/24/2025 2032 by Gabriele Rodriguez RN  Safety Promotion/Fall Prevention:   activity supervised   assistive device/personal items within reach   lighting adjusted   mobility aid in reach   nonskid shoes/slippers when out of bed   room door open   room near nurse's station   room organization consistent   safety round/check completed   supervised activity     Problem: Adult Inpatient Plan of Care  Goal: Optimal Comfort and Wellbeing  Intervention: Monitor Pain and Promote Comfort  Recent Flowsheet Documentation  Taken 3/24/2025 2337 by Gabriele Rodriguez RN  Pain Management Interventions: medication (see MAR)  Taken 3/24/2025 2032 by Gabriele Rodriguez RN  Pain Management Interventions: medication (see MAR)     Problem: Adult Inpatient Plan of Care  Goal: Patient-Specific Goal (Individualized)  Description: You can add care plan individualizations to a care plan. Examples of Individualization might be:  \"Parent requests to be called daily at 9am for status\", \"I have a hard time hearing out of my right ear\", or \"Do not touch me to wake me up as it startles  me\".  Outcome: Progressing   Goal Outcome Evaluation:    Pain was well controlled with scheduled meds during night shift.  Safety was maintained with hourly rounding, standard fall precautions, and bed alarm use. Pt remained compliant about calling for assistance during night shift.  Pt slept approx. 60% of night shift with minimal " interruptions.  Anticipating possible discharge home w/HH Tuesday, pending physician approval.

## 2025-03-25 NOTE — PROGRESS NOTES
[facility]    Medicine Progress Note - Hospitalist Service    Date of Admission:  3/12/2025    Assessment & Plan   Elizabeth Kelley is a 79 year old female with history of recurrent right lower extremity cellulitis, chronic lower extremity lymphedema, and chronic venous stasis,  who is admitted for LEFT lower extremity cellulitis which is slow to improve.  Currently on IV vancomycin and ceftriaxone and ID anticipates p.o. antibiotics in 2 to 3 days.  Hospital course complicated by acute on chronic anemia, suspect multifactorial.  S/p 2 units PRBCs.        3/20 :     Not needing oxygen at this time  No new complaints  Hb stable at 7.4, continue PPI  cephalexin 1gm po tid and minocycline 100mg po bid x 7 days, after completion resume chronic suppression with minocycline 50mg po daily  -continue lymphedema wraps        3/24 :     Not needing oxygen at this time  No new complaints  Hb stable at 7.8, continue PPI  cephalexin 1gm po tid and minocycline 100mg po bid x 7 days, after completion resume chronic suppression with minocycline 50mg po daily  -continue lymphedema wraps    Patient c/o having fevers around 99.8  The temp of 99.8 doesn't seem concerning at this time, had curbside discussion with ID as well  No new changes in plan    Plan to discharge home once home health arranged, declining TCU  Discharge in am once home health set up      A/p :        Left Lower Extremity Cellulitis  Presents with LEFT lower extremity erythema, edema.  Admitted 1/12 - 1/18/2025 for RIGHT lower extremity cellulitis and sepsis.  She was treated with meropenem and vancomycin, then transitioned to doxycycline.  3 weeks ago started suppression with minocycline 50 mg daily   Had elevated procalcitonin, CRP and leukocytosis.  -> 153  Ultrasound negative for DVT.  ABIs wnl   CT scan of the left thigh shows no abscess and no osteomyelitis.   On Vanco since 3/12 - present, Zosyn 3/12 - 3/17, ceftriaxone since  3/17- present   ID now signed off with recommendations in for antibiotics upon discharge.  WOC RN is following      Acute on chronic anemia.  Likely multifactorial.   Some oozing from the RLE wounds but otherwise no obvious active bleeding.  Has h/o upper GIB due to PUD, recent colonoscopy was unremarkable   labs suggestive of DAYANARA, B12 and folate normal, no evidence of hemolysis  transfused  2 units  PRBCs on 3/18 and 3/20   PPI BID   EGD done on 03/21 reported with sizable hiatal hernia with Brian lesions.  Endoscopic intervention was deferred  Surgical repair would be definitive management ideally  Iron supplement daily   -Hemoglobin is 7.5 today     KIKE - improving, creatinine back to baseline   Monitor      Chronic Bilateral Lower Extremity Lymphedema, Chronic Venous Stasis  Increase Lasix to 60 mg oral daily  Continue spironolactone 25 mg oral daily.  Plan to titrate up by tomorrow  PT for lymphedema wraps   WOC consulted     Generalized weakness  PT/OT are recommending TCU, patient has been declining so far     Chronic stable conditions:   Restless leg syndrome - continue PTA ropinirole  Anxiety depression - continue PTA Wellbutrin and Celexa  Hyperlipidemia - continue PTA statin   GERD - continue PTA PPI  Obesity: BMI 48.             Diet: Combination Diet Regular Diet Adult  Diet    DVT Prophylaxis: VTE Prophylaxis contraindicated due to BL LE cellulitis and severe anemia  Amato Catheter: Not present  Lines: PRESENT      PICC 03/20/25 Double Lumen Left Basilic Vascular access-Site Assessment: WDL      Cardiac Monitoring: None  Code Status: Full Code      Clinically Significant Risk Factors               # Hypoalbuminemia: Lowest albumin = 2.2 g/dL at 3/18/2025  6:58 AM, will monitor as appropriate     # Hypertension: Noted on problem list            # Severe Obesity: Estimated body mass index is 49.94 kg/m  as calculated from the following:    Height as of this encounter: 1.524 m (5').    Weight as of this  "encounter: 116 kg (255 lb 11.7 oz).        # Financial/Environmental Concerns: none;other (see comments) (Significant other states, \"without me she would be living on the streets and be unable to afford food, but since I still work full time we do just fine. Her  left her and kicked her out of the house about 15 years ago\".)         Social Drivers of Health    Tobacco Use: Medium Risk (10/21/2024)    Patient History     Smoking Tobacco Use: Former     Smokeless Tobacco Use: Never          Disposition Plan     Medically Ready for Discharge: Anticipated Tomorrow             Bryon Reza MD  Hospitalist Service  [facility]  Securely message with [employer] (more info)  Text page via [employer] Paging/Directory   ______________________________________________________________________      Physical Exam   Vital Signs: Temp: 98.7  F (37.1  C) Temp src: Oral BP: 124/57 Pulse: 75   Resp: 18 SpO2: 92 % O2 Device: None (Room air)    Weight: 255 lbs 11.74 oz    General Appearance:  No distress noted  Respiratory: Good air entry bilaterally  Cardiovascular: S1 and S2 heard  GI: Soft abdomen, no tenderness, normoactive bowel sound  Skin: Bilateral lower extremity red and swollen with lymphedema wraps       Medical Decision Making       60 MINUTES SPENT BY ME on the date of service doing chart review, history, exam, documentation & further activities per the note.      Data     "

## 2025-03-25 NOTE — PROGRESS NOTES
"Care Management Follow Up    Length of Stay (days): 13    Expected Discharge Date: 03/25/2025    Anticipated Discharge Plan:  Home Care    Transportation: Anticipate Family/friend    PT Recommendations: home with assist, home with home care physical therapy  OT Recommendations:  Transitional Care Facility     Barriers to Discharge: Pt likely ready for discharge today. Pt wanted to stay one more night to make sure temp was monitored, and home care established. Writer was able to get home care established with the help of [facility].     Prior Living Situation: house (\"It is a very small house. There are a few steps to get inside the house. Then inside it is all 1 level.\") with significant other    Discussed  Partnership in Safe Discharge Planning  document with patient/family: No     Handoff Completed: Yes, [facility] PCP: Internal handoff referral completed    Patient/Spokesperson Updated: Yes. Who? Pt     Additional Information:  [facility] can take pt on for home PT/OT/RN/HHA.       Next Steps: Send home care orders if discharging today, and update [facility] . CM will continue to monitor progression of care, review team recommendations and provide discharge planning assist as needed.       Natalya Nash RN      Care Management Discharge Note    Discharge Date: 03/25/2025       Discharge Disposition: Home Care    Discharge Services: Home Care    Discharge DME: None    Discharge Transportation: family or friend will provide    Private pay costs discussed: Not applicable    Does the patient's insurance plan have a 3 day qualifying hospital stay waiver?  No    PAS Confirmation Code:  NA  Patient/family educated on Medicare website which has current facility and service quality ratings: yes    Education Provided on the Discharge Plan: Yes  Persons Notified of Discharge Plans: Yes, pt  Patient/Family in Agreement with the Plan: yes    Handoff Referral Completed: Yes, [facility] PCP: Internal handoff referral " completed    Additional Information:    Final discharge plan is for pt to return home with spouse and outpatient follow up. [facility] taking pt on for home PT/OT/RN/HHA, orders faxed to fax #[phone].  S/o to transport.       Natalya Nash RN

## 2025-03-26 ENCOUNTER — HOSPITAL ENCOUNTER (OUTPATIENT)
Facility: HOSPITAL | Age: 80
Setting detail: OBSERVATION
Discharge: NURSING, REHAB OR LONG-TERM CARE | End: 2025-03-29
Attending: EMERGENCY MEDICINE | Admitting: INTERNAL MEDICINE
Payer: COMMERCIAL

## 2025-03-26 DIAGNOSIS — R53.1 WEAKNESS GENERALIZED: ICD-10-CM

## 2025-03-26 DIAGNOSIS — L03.115 CELLULITIS OF RIGHT LOWER LEG: ICD-10-CM

## 2025-03-26 DIAGNOSIS — R19.7 DIARRHEA, UNSPECIFIED TYPE: ICD-10-CM

## 2025-03-26 DIAGNOSIS — L03.116 CELLULITIS OF LEFT LOWER EXTREMITY: ICD-10-CM

## 2025-03-26 DIAGNOSIS — N17.9 AKI (ACUTE KIDNEY INJURY): Primary | ICD-10-CM

## 2025-03-26 PROBLEM — A41.9 SEPSIS (H): Status: RESOLVED | Noted: 2024-07-16 | Resolved: 2025-03-26

## 2025-03-26 PROBLEM — G25.81 RESTLESS LEGS: Status: ACTIVE | Noted: 2021-07-05

## 2025-03-26 PROBLEM — I10 ESSENTIAL HYPERTENSION: Status: ACTIVE | Noted: 2021-04-28

## 2025-03-26 PROBLEM — D64.9 ANEMIA: Status: ACTIVE | Noted: 2025-03-26

## 2025-03-26 PROBLEM — R60.0 PERIPHERAL EDEMA: Status: RESOLVED | Noted: 2025-03-12 | Resolved: 2025-03-26

## 2025-03-26 LAB
ANION GAP SERPL CALCULATED.3IONS-SCNC: 8 MMOL/L (ref 7–15)
BUN SERPL-MCNC: 16.1 MG/DL (ref 8–23)
CALCIUM SERPL-MCNC: 9 MG/DL (ref 8.8–10.4)
CHLORIDE SERPL-SCNC: 98 MMOL/L (ref 98–107)
CREAT SERPL-MCNC: 0.96 MG/DL (ref 0.51–0.95)
CRP SERPL-MCNC: 207.3 MG/L
EGFRCR SERPLBLD CKD-EPI 2021: 61 ML/MIN/1.73M2
ERYTHROCYTE [DISTWIDTH] IN BLOOD BY AUTOMATED COUNT: 18.6 % (ref 10–15)
GLUCOSE SERPL-MCNC: 91 MG/DL (ref 70–99)
HCO3 SERPL-SCNC: 34 MMOL/L (ref 22–29)
HCT VFR BLD AUTO: 28.6 % (ref 35–47)
HGB BLD-MCNC: 8.8 G/DL (ref 11.7–15.7)
LACTATE SERPL-SCNC: 1.4 MMOL/L (ref 0.7–2)
MAGNESIUM SERPL-MCNC: 1.9 MG/DL (ref 1.7–2.3)
MCH RBC QN AUTO: 25.4 PG (ref 26.5–33)
MCHC RBC AUTO-ENTMCNC: 30.8 G/DL (ref 31.5–36.5)
MCV RBC AUTO: 83 FL (ref 78–100)
PLATELET # BLD AUTO: 698 10E3/UL (ref 150–450)
POTASSIUM SERPL-SCNC: 4.2 MMOL/L (ref 3.4–5.3)
RBC # BLD AUTO: 3.46 10E6/UL (ref 3.8–5.2)
SODIUM SERPL-SCNC: 140 MMOL/L (ref 135–145)
T4 FREE SERPL-MCNC: 1.33 NG/DL (ref 0.9–1.7)
TSH SERPL DL<=0.005 MIU/L-ACNC: 5.42 UIU/ML (ref 0.3–4.2)
WBC # BLD AUTO: 10.4 10E3/UL (ref 4–11)

## 2025-03-26 PROCEDURE — 80048 BASIC METABOLIC PNL TOTAL CA: CPT | Performed by: EMERGENCY MEDICINE

## 2025-03-26 PROCEDURE — 36415 COLL VENOUS BLD VENIPUNCTURE: CPT | Performed by: EMERGENCY MEDICINE

## 2025-03-26 PROCEDURE — G0378 HOSPITAL OBSERVATION PER HR: HCPCS

## 2025-03-26 PROCEDURE — 86140 C-REACTIVE PROTEIN: CPT | Performed by: EMERGENCY MEDICINE

## 2025-03-26 PROCEDURE — 84443 ASSAY THYROID STIM HORMONE: CPT | Performed by: EMERGENCY MEDICINE

## 2025-03-26 PROCEDURE — 99222 1ST HOSP IP/OBS MODERATE 55: CPT | Performed by: INTERNAL MEDICINE

## 2025-03-26 PROCEDURE — 85018 HEMOGLOBIN: CPT | Performed by: EMERGENCY MEDICINE

## 2025-03-26 PROCEDURE — 83735 ASSAY OF MAGNESIUM: CPT | Performed by: EMERGENCY MEDICINE

## 2025-03-26 PROCEDURE — 84439 ASSAY OF FREE THYROXINE: CPT | Performed by: EMERGENCY MEDICINE

## 2025-03-26 PROCEDURE — 82310 ASSAY OF CALCIUM: CPT | Performed by: EMERGENCY MEDICINE

## 2025-03-26 PROCEDURE — 83605 ASSAY OF LACTIC ACID: CPT | Performed by: EMERGENCY MEDICINE

## 2025-03-26 PROCEDURE — 250N000013 HC RX MED GY IP 250 OP 250 PS 637: Performed by: INTERNAL MEDICINE

## 2025-03-26 PROCEDURE — 99285 EMERGENCY DEPT VISIT HI MDM: CPT

## 2025-03-26 RX ORDER — PANTOPRAZOLE SODIUM 40 MG/1
40 TABLET, DELAYED RELEASE ORAL
Status: DISCONTINUED | OUTPATIENT
Start: 2025-03-27 | End: 2025-03-29 | Stop reason: HOSPADM

## 2025-03-26 RX ORDER — POLYETHYLENE GLYCOL 3350 17 G/17G
17 POWDER, FOR SOLUTION ORAL 2 TIMES DAILY PRN
Status: DISCONTINUED | OUTPATIENT
Start: 2025-03-26 | End: 2025-03-29 | Stop reason: HOSPADM

## 2025-03-26 RX ORDER — FERROUS SULFATE 325(65) MG
325 TABLET ORAL EVERY OTHER DAY
Status: DISCONTINUED | OUTPATIENT
Start: 2025-03-27 | End: 2025-03-29 | Stop reason: HOSPADM

## 2025-03-26 RX ORDER — NYSTATIN 100000 [USP'U]/G
POWDER TOPICAL 2 TIMES DAILY PRN
Status: DISCONTINUED | OUTPATIENT
Start: 2025-03-26 | End: 2025-03-26 | Stop reason: ALTCHOICE

## 2025-03-26 RX ORDER — NITROGLYCERIN 0.4 MG/1
0.4 TABLET SUBLINGUAL EVERY 5 MIN PRN
Status: DISCONTINUED | OUTPATIENT
Start: 2025-03-26 | End: 2025-03-29 | Stop reason: HOSPADM

## 2025-03-26 RX ORDER — POTASSIUM CHLORIDE 1500 MG/1
20 TABLET, EXTENDED RELEASE ORAL 2 TIMES DAILY
Status: DISCONTINUED | OUTPATIENT
Start: 2025-03-26 | End: 2025-03-29 | Stop reason: HOSPADM

## 2025-03-26 RX ORDER — HYDRALAZINE HYDROCHLORIDE 25 MG/1
25 TABLET, FILM COATED ORAL 3 TIMES DAILY PRN
Status: DISCONTINUED | OUTPATIENT
Start: 2025-03-26 | End: 2025-03-29 | Stop reason: HOSPADM

## 2025-03-26 RX ORDER — FUROSEMIDE 20 MG/1
60 TABLET ORAL DAILY
Status: DISCONTINUED | OUTPATIENT
Start: 2025-03-27 | End: 2025-03-29 | Stop reason: HOSPADM

## 2025-03-26 RX ORDER — PROCHLORPERAZINE MALEATE 5 MG/1
5 TABLET ORAL EVERY 6 HOURS PRN
Status: DISCONTINUED | OUTPATIENT
Start: 2025-03-26 | End: 2025-03-29 | Stop reason: HOSPADM

## 2025-03-26 RX ORDER — MULTIVIT WITH MINERALS/LUTEIN
250 TABLET ORAL DAILY
Status: DISCONTINUED | OUTPATIENT
Start: 2025-03-27 | End: 2025-03-29 | Stop reason: HOSPADM

## 2025-03-26 RX ORDER — ROPINIROLE 0.25 MG/1
0.5 TABLET, FILM COATED ORAL 2 TIMES DAILY
Status: DISCONTINUED | OUTPATIENT
Start: 2025-03-26 | End: 2025-03-29 | Stop reason: HOSPADM

## 2025-03-26 RX ORDER — MINOCYCLINE HYDROCHLORIDE 100 MG/1
100 CAPSULE ORAL EVERY 12 HOURS
Status: DISCONTINUED | OUTPATIENT
Start: 2025-03-26 | End: 2025-03-29 | Stop reason: HOSPADM

## 2025-03-26 RX ORDER — SPIRONOLACTONE 25 MG/1
25 TABLET ORAL EVERY MORNING
Status: DISCONTINUED | OUTPATIENT
Start: 2025-03-27 | End: 2025-03-29 | Stop reason: HOSPADM

## 2025-03-26 RX ORDER — THERA TABS 400 MCG
1 TAB ORAL EVERY MORNING
Status: DISCONTINUED | OUTPATIENT
Start: 2025-03-27 | End: 2025-03-29 | Stop reason: HOSPADM

## 2025-03-26 RX ORDER — AMOXICILLIN 250 MG
2 CAPSULE ORAL 2 TIMES DAILY PRN
Status: DISCONTINUED | OUTPATIENT
Start: 2025-03-26 | End: 2025-03-29 | Stop reason: HOSPADM

## 2025-03-26 RX ORDER — CITALOPRAM HYDROBROMIDE 40 MG/1
40 TABLET ORAL
Status: DISCONTINUED | OUTPATIENT
Start: 2025-03-27 | End: 2025-03-29 | Stop reason: HOSPADM

## 2025-03-26 RX ORDER — GABAPENTIN 100 MG/1
100 CAPSULE ORAL 3 TIMES DAILY
Status: DISCONTINUED | OUTPATIENT
Start: 2025-03-26 | End: 2025-03-29 | Stop reason: HOSPADM

## 2025-03-26 RX ORDER — ONDANSETRON 4 MG/1
4 TABLET, ORALLY DISINTEGRATING ORAL EVERY 6 HOURS PRN
Status: DISCONTINUED | OUTPATIENT
Start: 2025-03-26 | End: 2025-03-29 | Stop reason: HOSPADM

## 2025-03-26 RX ORDER — CARBOXYMETHYLCELLULOSE SODIUM 5 MG/ML
1 SOLUTION/ DROPS OPHTHALMIC DAILY PRN
Status: DISCONTINUED | OUTPATIENT
Start: 2025-03-26 | End: 2025-03-29 | Stop reason: HOSPADM

## 2025-03-26 RX ORDER — AMOXICILLIN 250 MG
1 CAPSULE ORAL 2 TIMES DAILY PRN
Status: DISCONTINUED | OUTPATIENT
Start: 2025-03-26 | End: 2025-03-29 | Stop reason: HOSPADM

## 2025-03-26 RX ORDER — ONDANSETRON 2 MG/ML
4 INJECTION INTRAMUSCULAR; INTRAVENOUS EVERY 6 HOURS PRN
Status: DISCONTINUED | OUTPATIENT
Start: 2025-03-26 | End: 2025-03-29 | Stop reason: HOSPADM

## 2025-03-26 RX ORDER — SIMVASTATIN 40 MG
40 TABLET ORAL AT BEDTIME
Status: DISCONTINUED | OUTPATIENT
Start: 2025-03-26 | End: 2025-03-29 | Stop reason: HOSPADM

## 2025-03-26 RX ORDER — ACETAMINOPHEN 325 MG/1
650 TABLET ORAL EVERY 4 HOURS PRN
Status: DISCONTINUED | OUTPATIENT
Start: 2025-03-26 | End: 2025-03-29 | Stop reason: HOSPADM

## 2025-03-26 RX ORDER — BUPROPION HYDROCHLORIDE 150 MG/1
150 TABLET, EXTENDED RELEASE ORAL EVERY MORNING
Status: DISCONTINUED | OUTPATIENT
Start: 2025-03-27 | End: 2025-03-29 | Stop reason: HOSPADM

## 2025-03-26 RX ORDER — CEPHALEXIN 500 MG/1
1000 CAPSULE ORAL EVERY 8 HOURS
Status: DISCONTINUED | OUTPATIENT
Start: 2025-03-26 | End: 2025-03-29 | Stop reason: HOSPADM

## 2025-03-26 RX ORDER — ACETAMINOPHEN 650 MG/1
650 SUPPOSITORY RECTAL EVERY 4 HOURS PRN
Status: DISCONTINUED | OUTPATIENT
Start: 2025-03-26 | End: 2025-03-29 | Stop reason: HOSPADM

## 2025-03-26 RX ORDER — POTASSIUM CHLORIDE 1500 MG/1
20 TABLET, EXTENDED RELEASE ORAL 2 TIMES DAILY
Status: DISCONTINUED | OUTPATIENT
Start: 2025-03-26 | End: 2025-03-26

## 2025-03-26 RX ADMIN — MICONAZOLE NITRATE ANTIFUNGAL POWDER: 2 POWDER TOPICAL at 22:45

## 2025-03-26 RX ADMIN — ROPINIROLE HYDROCHLORIDE 0.5 MG: 0.25 TABLET, FILM COATED ORAL at 22:41

## 2025-03-26 RX ADMIN — CEPHALEXIN 1000 MG: 500 CAPSULE ORAL at 22:41

## 2025-03-26 RX ADMIN — GABAPENTIN 100 MG: 100 CAPSULE ORAL at 22:41

## 2025-03-26 RX ADMIN — MINOCYCLINE HYDROCHLORIDE 100 MG: 100 CAPSULE ORAL at 22:38

## 2025-03-26 RX ADMIN — SIMVASTATIN 40 MG: 40 TABLET, FILM COATED ORAL at 22:41

## 2025-03-26 RX ADMIN — ACETAMINOPHEN 650 MG: 325 TABLET ORAL at 22:41

## 2025-03-26 RX ADMIN — POTASSIUM CHLORIDE 20 MEQ: 1500 TABLET, EXTENDED RELEASE ORAL at 22:41

## 2025-03-26 ASSESSMENT — ACTIVITIES OF DAILY LIVING (ADL)
ADLS_ACUITY_SCORE: 65
ADLS_ACUITY_SCORE: 65
ADLS_ACUITY_SCORE: 60
ADLS_ACUITY_SCORE: 60

## 2025-03-27 ENCOUNTER — APPOINTMENT (OUTPATIENT)
Dept: OCCUPATIONAL THERAPY | Facility: HOSPITAL | Age: 80
End: 2025-03-27
Attending: INTERNAL MEDICINE
Payer: COMMERCIAL

## 2025-03-27 LAB
ADV 40+41 DNA STL QL NAA+NON-PROBE: NEGATIVE
ALBUMIN UR-MCNC: 50 MG/DL
APPEARANCE UR: CLEAR
ASTRO TYP 1-8 RNA STL QL NAA+NON-PROBE: NEGATIVE
BILIRUB UR QL STRIP: NEGATIVE
C CAYETANENSIS DNA STL QL NAA+NON-PROBE: NEGATIVE
C DIFF TOX B STL QL: NEGATIVE
CAMPYLOBACTER DNA SPEC NAA+PROBE: NEGATIVE
COLOR UR AUTO: YELLOW
CRYPTOSP DNA STL QL NAA+NON-PROBE: NEGATIVE
E COLI O157 DNA STL QL NAA+NON-PROBE: NORMAL
E HISTOLYT DNA STL QL NAA+NON-PROBE: NEGATIVE
EAEC ASTA GENE ISLT QL NAA+PROBE: NEGATIVE
EC STX1+STX2 GENES STL QL NAA+NON-PROBE: NEGATIVE
EPEC EAE GENE STL QL NAA+NON-PROBE: NEGATIVE
ERYTHROCYTE [DISTWIDTH] IN BLOOD BY AUTOMATED COUNT: 18.6 % (ref 10–15)
ETEC LTA+ST1A+ST1B TOX ST NAA+NON-PROBE: NEGATIVE
G LAMBLIA DNA STL QL NAA+NON-PROBE: NEGATIVE
GLUCOSE UR STRIP-MCNC: NEGATIVE MG/DL
HCT VFR BLD AUTO: 25.4 % (ref 35–47)
HGB BLD-MCNC: 7.8 G/DL (ref 11.7–15.7)
HGB UR QL STRIP: NEGATIVE
HOLD SPECIMEN: NORMAL
KETONES UR STRIP-MCNC: ABNORMAL MG/DL
LEUKOCYTE ESTERASE UR QL STRIP: NEGATIVE
MCH RBC QN AUTO: 25.5 PG (ref 26.5–33)
MCHC RBC AUTO-ENTMCNC: 30.7 G/DL (ref 31.5–36.5)
MCV RBC AUTO: 83 FL (ref 78–100)
NITRATE UR QL: NEGATIVE
NOROVIRUS GI+II RNA STL QL NAA+NON-PROBE: NEGATIVE
P SHIGELLOIDES DNA STL QL NAA+NON-PROBE: NEGATIVE
PH UR STRIP: 6.5 [PH] (ref 5–7)
PLATELET # BLD AUTO: 617 10E3/UL (ref 150–450)
RBC # BLD AUTO: 3.06 10E6/UL (ref 3.8–5.2)
RBC URINE: 1 /HPF
RVA RNA STL QL NAA+NON-PROBE: NEGATIVE
SALMONELLA SP RPOD STL QL NAA+PROBE: NEGATIVE
SAPO I+II+IV+V RNA STL QL NAA+NON-PROBE: NEGATIVE
SHIGELLA SP+EIEC IPAH ST NAA+NON-PROBE: NEGATIVE
SP GR UR STRIP: 1.03 (ref 1–1.03)
SQUAMOUS EPITHELIAL: 4 /HPF
UROBILINOGEN UR STRIP-MCNC: 4 MG/DL
V CHOLERAE DNA SPEC QL NAA+PROBE: NEGATIVE
VIBRIO DNA SPEC NAA+PROBE: NEGATIVE
WBC # BLD AUTO: 9 10E3/UL (ref 4–11)
WBC URINE: 3 /HPF
Y ENTEROCOL DNA STL QL NAA+PROBE: NEGATIVE

## 2025-03-27 PROCEDURE — 99233 SBSQ HOSP IP/OBS HIGH 50: CPT | Mod: FS | Performed by: EMERGENCY MEDICINE

## 2025-03-27 PROCEDURE — G0463 HOSPITAL OUTPT CLINIC VISIT: HCPCS

## 2025-03-27 PROCEDURE — 87507 IADNA-DNA/RNA PROBE TQ 12-25: CPT

## 2025-03-27 PROCEDURE — G0378 HOSPITAL OBSERVATION PER HR: HCPCS

## 2025-03-27 PROCEDURE — 97166 OT EVAL MOD COMPLEX 45 MIN: CPT | Mod: GO

## 2025-03-27 PROCEDURE — 81003 URINALYSIS AUTO W/O SCOPE: CPT | Performed by: EMERGENCY MEDICINE

## 2025-03-27 PROCEDURE — 87493 C DIFF AMPLIFIED PROBE: CPT

## 2025-03-27 PROCEDURE — 85027 COMPLETE CBC AUTOMATED: CPT | Performed by: INTERNAL MEDICINE

## 2025-03-27 PROCEDURE — 250N000013 HC RX MED GY IP 250 OP 250 PS 637: Performed by: INTERNAL MEDICINE

## 2025-03-27 PROCEDURE — 99207 PR APP CREDIT; MD BILLING SHARED VISIT: CPT | Mod: FS

## 2025-03-27 PROCEDURE — 250N000013 HC RX MED GY IP 250 OP 250 PS 637

## 2025-03-27 PROCEDURE — 36415 COLL VENOUS BLD VENIPUNCTURE: CPT | Performed by: INTERNAL MEDICINE

## 2025-03-27 PROCEDURE — 97530 THERAPEUTIC ACTIVITIES: CPT | Mod: GO

## 2025-03-27 RX ORDER — HYDROMORPHONE HCL IN WATER/PF 6 MG/30 ML
0.4 PATIENT CONTROLLED ANALGESIA SYRINGE INTRAVENOUS
Status: DISCONTINUED | OUTPATIENT
Start: 2025-03-27 | End: 2025-03-29 | Stop reason: HOSPADM

## 2025-03-27 RX ORDER — LACTOBACILLUS RHAMNOSUS GG 10B CELL
1 CAPSULE ORAL 2 TIMES DAILY
Status: DISCONTINUED | OUTPATIENT
Start: 2025-03-27 | End: 2025-03-29 | Stop reason: HOSPADM

## 2025-03-27 RX ORDER — HYDROMORPHONE HYDROCHLORIDE 2 MG/1
2 TABLET ORAL
Status: DISCONTINUED | OUTPATIENT
Start: 2025-03-27 | End: 2025-03-29 | Stop reason: HOSPADM

## 2025-03-27 RX ORDER — MINERAL OIL/HYDROPHIL PETROLAT
OINTMENT (GRAM) TOPICAL 2 TIMES DAILY
Status: DISCONTINUED | OUTPATIENT
Start: 2025-03-27 | End: 2025-03-29 | Stop reason: HOSPADM

## 2025-03-27 RX ADMIN — FUROSEMIDE 60 MG: 20 TABLET ORAL at 09:02

## 2025-03-27 RX ADMIN — Medication 1 CAPSULE: at 10:52

## 2025-03-27 RX ADMIN — CEPHALEXIN 1000 MG: 500 CAPSULE ORAL at 06:22

## 2025-03-27 RX ADMIN — ACETAMINOPHEN 650 MG: 325 TABLET ORAL at 18:41

## 2025-03-27 RX ADMIN — ROPINIROLE HYDROCHLORIDE 0.5 MG: 0.25 TABLET, FILM COATED ORAL at 19:56

## 2025-03-27 RX ADMIN — MICONAZOLE NITRATE ANTIFUNGAL POWDER: 2 POWDER TOPICAL at 19:56

## 2025-03-27 RX ADMIN — MINOCYCLINE HYDROCHLORIDE 100 MG: 100 CAPSULE ORAL at 10:53

## 2025-03-27 RX ADMIN — CEPHALEXIN 1000 MG: 500 CAPSULE ORAL at 14:27

## 2025-03-27 RX ADMIN — CITALOPRAM HYDROBROMIDE 40 MG: 40 TABLET, FILM COATED ORAL at 12:48

## 2025-03-27 RX ADMIN — PANTOPRAZOLE SODIUM 40 MG: 40 TABLET, DELAYED RELEASE ORAL at 16:16

## 2025-03-27 RX ADMIN — POTASSIUM CHLORIDE 20 MEQ: 1500 TABLET, EXTENDED RELEASE ORAL at 09:02

## 2025-03-27 RX ADMIN — Medication 250 MG: at 09:02

## 2025-03-27 RX ADMIN — SPIRONOLACTONE 25 MG: 25 TABLET, FILM COATED ORAL at 09:02

## 2025-03-27 RX ADMIN — CEPHALEXIN 1000 MG: 500 CAPSULE ORAL at 21:41

## 2025-03-27 RX ADMIN — PANTOPRAZOLE SODIUM 40 MG: 40 TABLET, DELAYED RELEASE ORAL at 06:22

## 2025-03-27 RX ADMIN — Medication 1 CAPSULE: at 19:56

## 2025-03-27 RX ADMIN — GABAPENTIN 100 MG: 100 CAPSULE ORAL at 14:27

## 2025-03-27 RX ADMIN — SIMVASTATIN 40 MG: 40 TABLET, FILM COATED ORAL at 21:41

## 2025-03-27 RX ADMIN — BUPROPION HYDROCHLORIDE 150 MG: 150 TABLET, FILM COATED, EXTENDED RELEASE ORAL at 09:02

## 2025-03-27 RX ADMIN — HYDROMORPHONE HYDROCHLORIDE 2 MG: 2 TABLET ORAL at 18:41

## 2025-03-27 RX ADMIN — POTASSIUM CHLORIDE 20 MEQ: 1500 TABLET, EXTENDED RELEASE ORAL at 19:55

## 2025-03-27 RX ADMIN — GABAPENTIN 100 MG: 100 CAPSULE ORAL at 19:55

## 2025-03-27 RX ADMIN — ROPINIROLE HYDROCHLORIDE 0.5 MG: 0.25 TABLET, FILM COATED ORAL at 09:02

## 2025-03-27 RX ADMIN — HYDROMORPHONE HYDROCHLORIDE 2 MG: 2 TABLET ORAL at 10:52

## 2025-03-27 RX ADMIN — ACETAMINOPHEN 650 MG: 325 TABLET ORAL at 03:52

## 2025-03-27 RX ADMIN — THERA TABS 1 TABLET: TAB at 09:02

## 2025-03-27 RX ADMIN — FERROUS SULFATE TAB 325 MG (65 MG ELEMENTAL FE) 325 MG: 325 (65 FE) TAB at 09:02

## 2025-03-27 RX ADMIN — HYDROMORPHONE HYDROCHLORIDE 2 MG: 2 TABLET ORAL at 22:28

## 2025-03-27 RX ADMIN — GABAPENTIN 100 MG: 100 CAPSULE ORAL at 09:02

## 2025-03-27 ASSESSMENT — ACTIVITIES OF DAILY LIVING (ADL)
ADLS_ACUITY_SCORE: 67
ADLS_ACUITY_SCORE: 51
DEPENDENT_IADLS:: INDEPENDENT
ADLS_ACUITY_SCORE: 67
ADLS_ACUITY_SCORE: 51
ADLS_ACUITY_SCORE: 67
ADLS_ACUITY_SCORE: 65
ADLS_ACUITY_SCORE: 67
ADLS_ACUITY_SCORE: 51
ADLS_ACUITY_SCORE: 67
ADLS_ACUITY_SCORE: 51
ADLS_ACUITY_SCORE: 67
ADLS_ACUITY_SCORE: 51
ADLS_ACUITY_SCORE: 65
ADLS_ACUITY_SCORE: 51
ADLS_ACUITY_SCORE: 67
ADLS_ACUITY_SCORE: 67
ADLS_ACUITY_SCORE: 51
ADLS_ACUITY_SCORE: 67

## 2025-03-27 NOTE — PROGRESS NOTES
"   03/27/25 0750   Appointment Info   Signing Clinician's Name / Credentials (OT) Christina Key, OTR/L   Quick Adds   Quick Adds Certification   Living Environment   People in Home significant other  (SO travels for work)   Current Living Arrangements house   Home Accessibility stairs to enter home   Number of Stairs, Main Entrance 4;5   Stair Railings, Main Entrance railings safe and in good condition   Self-Care   Equipment Currently Used at Home cane, straight;walker, rolling  (states she owns a walker but does not typically use, will use cane in community)   Fall history within last six months no   Activity/Exercise/Self-Care Comment Independent with ADLs, states she is \"out of energy\" with simple tasks.   General Information   Onset of Illness/Injury or Date of Surgery 03/26/25   Referring Physician Shree Wilson MD   Patient/Family Therapy Goal Statement (OT) to go to TCU   Additional Occupational Profile Info/Pertinent History of Current Problem Per chart review, pt \"is 79 year old female with PMHx of recurrent right lower extremity cellulitis, chronic lower extremity lymphedema, chronic venous stasis, chronic anemia, RLS, HLD, GERD who was admitted on 3/12/2025. She has a history of recurrent hospitalizations for right lower extremity cellulitis. Patient recently admitted 1/12 - 1/18/2025 for RIGHT lower extremity cellulitis and sepsis.  Had been treated with meropenem and vancomycin, ID was consulted and she was discharged on doxycycline.  She presented to the ER today for evaluation of LEFT lower extremity erythema, edema, pain 3/12/25 and advised TCU but discharged home 3/25/25 -- now back requesting TCU placement.\"   Existing Precautions/Restrictions fall   Cognitive Status Examination   Orientation Status orientation to person, place and time  (A&Ox4)   Pain Assessment   Patient Currently in Pain Yes, see Vital Sign flowsheet  (pain in bilateral legs)   Range of Motion Comprehensive   General " Range of Motion no range of motion deficits identified   Strength Comprehensive (MMT)   Comment, General Manual Muscle Testing (MMT) Assessment Generalized weakness noted functionally   Bed Mobility   Bed Mobility supine-sit;sit-supine   Supine-Sit [place] (Bed Mobility) supervision   Sit-Supine [place] (Bed Mobility) minimum assist (75% patient effort)   Assistive Device (Bed Mobility) bed rails   Transfers   Transfers sit-stand transfer;toilet transfer   Sit-Stand Transfer   Sit-Stand [place] (Transfers) contact guard   Assistive Device (Sit-Stand Transfers) walker, front-wheeled   Toilet Transfer   [place] Level (Toilet Transfer) not tested   Toilet Transfer Comments Per clinical reasoning, anticipate pt would require Ax1 for toilet transfer.   Balance   Balance Comments Pt slightly unsteady with ambulation using FWW, no significant LOB.   Activities of Daily Living   BADL Assessment/Intervention lower body dressing;grooming   Lower Body Dressing Assessment/Training   [place] Level (Lower Body Dressing) maximum assist (25% patient effort)   Position (Lower Body Dressing) supine   Grooming Assessment/Training   [place] Level (Grooming) not tested   Comment, (Grooming) Per clinical reasoning, anticipate pt would have difficulty performing standing G/H d/t low activity tolerance, weakness, and pain.   Clinical Impression   Criteria for Skilled Therapeutic Interventions Met (OT) Yes, treatment indicated   OT Diagnosis Impaired ability to perform ADLs, IADLs, and functional mobility.   Influenced by the following impairments cellulitis of LLE   OT Problem List-Impairments impacting ADL problems related to;activity tolerance impaired;balance;mobility;strength;pain   Assessment of Occupational Performance 3-5 Performance Deficits   Identified Performance Deficits toileting, grooming/hygiene, lower body dressing, functional endurance   Planned Therapy Interventions (OT) ADL  retraining;balance training;strengthening;transfer training;progressive activity/exercise;home program guidelines;risk factor education   Clinical Decision Making Complexity (OT) detailed assessment/moderate complexity   Risk & Benefits of therapy have been explained evaluation/treatment results reviewed;care plan/treatment goals reviewed;risks/benefits reviewed;current/potential barriers reviewed;participants voiced agreement with care plan;participants included;patient   OT Total Evaluation Time   OT Eval, Moderate Complexity Minutes (68688) 15   Therapy Certification   Medical Diagnosis cellulitis of LLE   Start of Care Date 03/27/25   Certification date from 03/27/25   Certification date to 04/03/25   OT Goals   Therapy Frequency (OT) 5 times/week   OT Predicted Duration/Target Date for Goal Attainment 04/03/25   OT Goals Hygiene/Grooming;Toilet Transfer/Toileting;Lower Body Dressing;Aerobic Activity   OT: Hygiene/Grooming modified independent;using adaptive equipment;while standing   OT: Lower Body Dressing Modified independent;using adaptive equipment   OT: Toilet Transfer/Toileting Modified independent;toilet transfer;cleaning and garment management;using adaptive equipment   OT: Perform aerobic activity with stable cardiovascular response continuous activity;15 minutes   Interventions   Interventions Quick Adds Therapeutic Activity   Therapeutic Activities   Therapeutic Activity Minutes (06589) 8   Symptoms noted during/after treatment fatigue   Treatment Detail/Skilled Intervention Pt required seated rest break at EOB d/t dizziness, subsided after seated rest break. Focus on progressing activity tolerance with ambulation in hallway. Pt ambulated 90' with CGA using FWW, required 1 standing rest break d/t fatigue. Pt left in bed at end of session with bed alarm on and call light in reach.   OT Discharge Planning   OT Plan toileting, functional endurance, standing G/H, lower body dressing, energy conservation    OT Discharge Recommendation (DC Rec) Transitional Care Facility   OT Rationale for DC Rec Pt requiring Ax1 with all ADLs and functional mobility, fatigues very easily. Pt has not been managing well at home since last hospital stay.   OT Brief overview of current status CGA functional mobility, Max A lower body dressing, Min A bed mobility   OT Total Distance Amb During Session (feet) 90   Total Session Time   Timed Code Treatment Minutes 8   Total Session Time (sum of timed and untimed services) 23     [facility]                                                                                   OUTPATIENT OCCUPATIONAL THERAPY    PLAN OF TREATMENT FOR OUTPATIENT REHABILITATION   Patient's Last Name, First Name, HARRIETMICHAEL KelleyElizabeth YOB: 1945   Provider's Name   [facility]   Medical Record No.  [id number]     Onset Date: 03/26/25 Start of Care Date: 03/27/25     Medical Diagnosis:  cellulitis of LLE               OT Diagnosis:  Impaired ability to perform ADLs, IADLs, and functional mobility. Certification Dates:  From: 03/27/25  To: 04/03/25     See note for plan of treatment, functional goals, and certification details.    I CERTIFY THE NEED FOR THESE SERVICES FURNISHED UNDER        THIS PLAN OF TREATMENT AND WHILE UNDER MY CARE (Physician co-signature of this document indicates review and certification of the therapy plan).

## 2025-03-27 NOTE — DISCHARGE INSTRUCTIONS
"WOC DISCHARGE INSTRUCTIONS:  Right leg wound(s): Every other day can do wound cares on Lymphedema schedule if wrapping leg  Moisten 4\" roll of Kerlix with Vashe, wring out excess.  Wrap leg from knee to ankle and let soak for 15-20 minutes, remove from wound before Polymem  Cover wound with Polymem roll (#[id number]) - WRITING AWAY FROM WOUND, secure with kerlix roll  Cover Calf with 2 large Mextra under knee with medium size to fill in gap and two medium around ankle.  Secure with 2 rolls of dry 4\" Kerlix from toes to knees, place pillow under calf and protect pillow with Chux     Call stores for Mextra if out Large PS# [id number], medium PS# [id number]     Left leg-BID or PRN, can coordinate with Lymphedema prior to wrapping  Wash and dry  Apply Aquaphor from knee to just above toes, not in between toes     "

## 2025-03-27 NOTE — PROGRESS NOTES
"PRIMARY DIAGNOSIS: \"GENERIC\" NURSING  OUTPATIENT/OBSERVATION GOALS TO BE MET BEFORE DISCHARGE:  ADLs back to baseline: No    Activity and level of assistance: Up with maximum assistance. Consider SW and/or PT evaluation.     Pain status: Improved-controlled with oral pain medications.    Return to near baseline physical activity: No     Discharge Planner Nurse   Safe discharge environment identified: No  Barriers to discharge: Yes       Entered by: Jocy Reeves RN 03/27/2025 3:55 PM     Please review provider order for any additional goals.   Nurse to notify provider when observation goals have been met and patient is ready for discharge.  "

## 2025-03-27 NOTE — CONSULTS
Care Management Initial Consult    General Information  Assessment completed with: Patient,    Type of CM/SW Visit: Initial Assessment    Primary Care Provider verified and updated as needed: Yes   Readmission within the last 30 days: previous discharge plan unsuccessful   Return Category: Progression of disease  Reason for Consult: discharge planning  Advance Care Planning:            Communication Assessment  Patient's communication style: spoken language (English or Bilingual)             Cognitive  Cognitive/Neuro/Behavioral: WDL                      Living Environment:   People in home: significant other     Current living Arrangements: house      Able to return to prior arrangements:         Family/Social Support:  Care provided by: spouse/significant other  Provides care for:       Support system: Significant Other, Children       Jaleel  Description of Support System: Supportive         Current Resources:   Patient receiving home care services: Yes  Skilled Home Care Services:  (She was supposed to start HC services today.)     Community Resources: Home Care  Equipment currently used at home: cane, straight, walker, rolling  Supplies currently used at home:      Employment/Financial:  Employment Status:          Financial Concerns:             Does the patient's insurance plan have a 3 day qualifying hospital stay waiver?  No    Lifestyle & Psychosocial Needs:  Social Drivers of Health     Food Insecurity: Low Risk  (3/14/2025)    Food Insecurity     Within the past 12 months, did you worry that your food would run out before you got money to buy more?: No     Within the past 12 months, did the food you bought just not last and you didn t have money to get more?: No   Depression: Not on file   Housing Stability: Low Risk  (3/14/2025)    Housing Stability     Do you have housing? : Yes     Are you worried about losing your housing?: No   Tobacco Use: Medium Risk (3/26/2025)    Patient History     Smoking  Tobacco Use: Former     Smokeless Tobacco Use: Never     Passive Exposure: Not on file   Financial Resource Strain: Low Risk  (3/14/2025)    Financial Resource Strain     Within the past 12 months, have you or your family members you live with been unable to get utilities (heat, electricity) when it was really needed?: No   Alcohol Use: Not on file   Transportation Needs: Low Risk  (3/14/2025)    Transportation Needs     Within the past 12 months, has lack of transportation kept you from medical appointments, getting your medicines, non-medical meetings or appointments, work, or from getting things that you need?: No   Physical Activity: Not on file   Interpersonal Safety: Low Risk  (3/14/2025)    Interpersonal Safety     Do you feel physically and emotionally safe where you currently live?: Yes     Within the past 12 months, have you been hit, slapped, kicked or otherwise physically hurt by someone?: No     Within the past 12 months, have you been humiliated or emotionally abused in other ways by your partner or ex-partner?: No   Stress: Not on file   Social Connections: Not on file   Health Literacy: Not on file       Functional Status:  Prior to admission patient needed assistance:   Dependent ADLs:: Independent  Dependent IADLs:: Independent       Mental Health Status:  Mental Health Status: No Current Concerns       Chemical Dependency Status:  Chemical Dependency Status: No Current Concerns             Values/Beliefs:  Spiritual, Cultural Beliefs, Jewish Practices, Values that affect care: no               Discussed  Partnership in Safe Discharge Planning  document with patient/family: No    Additional Information:The chart was reviewed, and the SW met with the patient for the initial assessment at the bedside. The patient was discharged on 3/25 after nearly two weeks of hospitalization for severe cellulitis of her lower extremities. During the last admission, recommendations were for TCU placement, but the  patient declined, feeling that she could manage her care at home with the support of home care services. She now realizes that was not a good decision since she is having issues with both legs. She expressed that she would now like to go to TCU. She was notified that PT/OT will assess her during this hospital stay, and we will follow up with her regarding the recommendations. She was given a list of TCU choices to review in the meantime.    Per her report, she lives with her partner, Jaleel, in a one-level house. She manages most of her daily care independently, and her partner helps her as needed. She has a few steps going into the house but has noted that she has grab bars for support. She mentioned that Jaleel usually supports her with transportation and anticipates he will give her a ride upon discharge. She denied to have other needs/concerns at this time.     During her last admission, she was discharged home with home care services, and it was noted that they were scheduled to start services today.    Next Steps: Awaiting PT/OT recommendations, and the CM will follow up with the patient regarding discharge planning.    MANJULA Giron

## 2025-03-27 NOTE — PLAN OF CARE
[facility] EMERGENCY DEPARTMENT  ED Nurse Handoff Report    ED Chief complaint: Generalized Weakness      ED Diagnosis:   Final diagnoses:   Weakness generalized   Diarrhea, unspecified type       Code Status: Full Code    Allergies:   Allergies   Allergen Reactions    Other Environmental Allergy Anaphylaxis     Bees/Wasps Stings  Bees/Wasps Stings    Adhesive Tape Other (See Comments)     Red,itchy and oozes  Red,itchy and oozes    Adhesive [Cyanoacrylate] Unknown     Other reaction(s): sores    Latex Hives    Ragweeds Itching    Oxycodone-Acetaminophen Rash    Vicodin [Hydrocodone-Acetaminophen] Nausea and Vomiting and Hives       Patient Story: Recently hospitalized for RLE cellutis and now here with LLE cellulitis/unable to care for self at home  Focused Assessment:  LE swelling and cellulitis    Treatments and/or interventions provided: BLE dressings  Patient's response to treatments and/or interventions: A & O x4    To be done/followed up on inpatient unit:  LE dressings/meds    Does this patient have any cognitive concerns?: none    Activity level - Baseline/Home:  Stand with Assist and Walker  Activity Level - Current:   Stand with Assist and Walker    Patient's Preferred language: English   Needed?: Yes    Isolation: Contact   Infection: Not Applicable  C-Diff (Clostridium Difficile) diagnosis  Patient tested for COVID 19 prior to admission: NO  Bariatric?: No    Vital Signs:   Vitals:    03/26/25 2030 03/26/25 2127 03/26/25 2357 03/27/25 0745   BP: 129/56 138/64 120/58 131/60   BP Location:  Left arm Left arm    Pulse: 84 88 85 84   Resp:   18 20   Temp:  98.4  F (36.9  C) 98.3  F (36.8  C) 98.1  F (36.7  C)   TempSrc:  Oral Oral    SpO2: 97% 96% (!) 91% 94%   Weight:           Cardiac Rhythm:     Was the PSS-3 completed:  NA  What interventions are required if any?               Family Comments: none  OBS brochure/video discussed/provided to patient/family: N/A               Name of person given brochure if not patient: na                Relationship to patient: na      For the majority of the shift this patient's behavior was Green.   Behavioral interventions performed were none.    ED NURSE PHONE NUMBER: Anny ZARCO

## 2025-03-27 NOTE — CONSULTS
[facility] Nurse Inpatient Assessment     Consulted for: left leg    Summary: 3/27 Patient re admitted.  WOC saw on 3/25.  Dressings on right leg were on for unknown time.  Will need to order Polymem from another location and will resume wound care orders when that arrives.  Placed Exu dry as a temporary measure and wound care will need to be done by bedside RN when new supplies arrive this afternoon.  Left leg has no wounds.  Very dry, flaky skin and hemosiderin staining.  Will order Aquaphor for that leg.      3/25 Patient is discharging home today, wound care done yesterday.  Patient states it is going well and is having a little less pain in right leg.  Patient asked writer to clarify orders.  Vashe moistened Kerlix should be used to clean wound and then removed prior to Polymem applied to leg.  Those clarifications made to wound care order.     3/20 WOC checked in with patient.  Patient stated leg pain decreased over the past couple of days and she seems to be happy with new type of dressing.  The new dressing lasted almost 48 hours without strike through and patient was happy about not having to do daily changes. She is able to sleep, eat and function without the pain being overwhelming, she knows to expect some pain given the wseverity of wound but currently more tolerable than the past few days.  WOC team will again follow up with her next week to see status of wound and how the dressings are doing.     3/18: patient was evaluated in the am, wound care materials were ordered, woc team went back in the afternoon to complete cares when products arrived.     3/13 Patient had left leg wrapped by Lymphedema team prior to [facility] arrival.  Patient known to WOC team, notes from previous admissions available in Epic.  Sees home care x3 a week and does wound care independently on other days.  Right leg had signs of pseudomonas including green drainage and sweet, yeast like odor.  Vashe wound  cleanser should get rid of pseudomonas.    [employer] nurse follow-up plan: weekly    Patient History (according to provider note(s):       Elizabeth Kelley is a 79 year old female with PMHx of recurrent right lower extremity cellulitis, chronic lower extremity lymphedema, chronic venous stasis, chronic anemia, RLS, HLD, GERD who was admitted on 3/12/2025. She has a history of recurrent hospitalizations for right lower extremity cellulitis. Patient recently admitted 1/12 - 1/18/2025 for RIGHT lower extremity cellulitis and sepsis.  Had been treated with meropenem and vancomycin, ID was consulted and she was discharged on doxycycline.  She presented to the ER today for evaluation of LEFT lower extremity erythema, edema, pain and admitted for left lower extremity cellulitis.     Assessment:      Areas visualized during today's visit: Lower extremities     Skin Injury Location: right leg            Green drainage                                               front                                                                 lateral                                                               medial          3/18       3/27 left heel                                              left leg lat                                                   left leg shin                                               left leg medial       Right leg dressings                                    right leg shin                                               right leg medial                                        right leg lat  Last photo: 3/27/2025  Skin injury due to: Unclear etiology and cellulitis  Skin history and plan of care:   see chart  Affected area:      Skin assessment: Denudement, Edema, and Maceration and left leg is dry, flaky     Measurements (length x width x depth, in cm) circumference of leg from knee to ankle     Color: red and white maceration and bloody  left leg hemosiderin stained      Temperature  normal      Drainage:  "copious .      Color: creamy      Odor: strong and sweet  Pain: moderate, sharp  Pain interventions prior to dressing change: slow and gentle cares  and distraction requested pain meds  Treatment goal: Drainage control, Decrease moisture, and Protection  STATUS: deteriorating  Supplies ordered: ordered polymem from [facility]  there are some supplies bedside including Vashe, Mextra       Treatment Plan:     Right leg wound(s): Every other day can do wound cares on Lymphedema schedule if wrapping leg  Moisten 4\" roll of Kerlix with Vashe, wring out excess.  Wrap leg from knee to ankle and let soak for 15-20 minutes, remove from wound before Polymem  Cover wound with Polymem roll (#[id number]) - WRITING AWAY FROM WOUND, secure with kerlix roll  Cover Calf with 2 large Mextra under knee with medium size to fill in gap and two medium around ankle.  Secure with 2 rolls of dry 4\" Kerlix from toes to knees, place pillow under calf and protect pillow with Chux     Call stores for Mextra if out Large PS# [id number], medium PS# [id number]    Left leg-BID or PRN, can coordinate with Lymphedema prior to wrapping  Wash and dry  Apply Aquaphor from knee to just above toes, not in between toes    Orders: Reviewed and Written    RECOMMEND PRIMARY TEAM ORDER: None, at this time  Education provided: plan of care  Discussed plan of care with: Patient and Nurse  Notify WOC if wound(s) deteriorate.  Nursing to notify the Provider(s) and re-consult the WOC Nurse if new skin concern.    DATA:     Current support surface: Standard  Standard gel mattress (Isoflex)  Containment of urine/stool: Continent of bowel and Suction based external urinary catheter   BMI: Body mass index is 48.82 kg/m .   Active diet order: Orders Placed This Encounter      Advance Diet as Tolerated: Full Liquid Diet; Regular Diet Adult     Output: I/O last 3 completed shifts:  In: -   Out: 250 [Urine:250]     Labs:   Recent Labs   Lab 03/27/25  0827   HGB 7.8*   WBC 9.0     Pressure " injury risk assessment:   Sensory Perception: 3-->slightly limited  Moisture: 3-->occasionally moist  Activity: 3-->walks occasionally  Mobility: 3-->slightly limited  Nutrition: 3-->adequate  Friction and Shear: 2-->potential problem  Ajith Score: 17      ALBARO Da SilvaN RN CWOCN  Pager no longer in use, please contact through [employer] group: [facility]

## 2025-03-27 NOTE — PROGRESS NOTES
[facility]    Medicine Progress Note - Hospitalist Service    Date of Admission:  3/26/2025    Assessment & Plan   Elizabeth Kelley is a 79 year old female with PMHx of recurrent right lower extremity cellulitis, chronic lower extremity lymphedema, chronic venous stasis, chronic anemia, RLS, HLD, GERD who was admitted on 3/12/2025. She has a history of recurrent hospitalizations for right lower extremity cellulitis. Patient recently admitted 1/12 - 1/18/2025 for RIGHT lower extremity cellulitis and sepsis, treated with meropenem and vancomycin, ID was consulted and she was discharged on doxycycline.  She was again hospitalized 3/12 - 3/25 for LEFT lower extremity cellulitis. Treated with Vanco and Zosyn/ceftriaxone and discharged with PO Keflex. Recommendations were to discharge to TCU but patient declined. She returned to the ER on 3/26/25 for inability to care for self at home and new onset diarrhea.     Generalized Weakness   Physical Deconditioning   -- PT/OT/CM - again recommending TCU ; CM coordinating placement     Diarrhea   After returning home, had one episode of emesis and nausea followed by several episodes of loose stools. No abdominal pain   -- Checking enteric panel and C diff  -- Starting probiotics    -- Supportive cares for now   -- Monitor I+Os   -- ADAT     Recent Left Lower Extremity Cellulitis  Had been empirically treated with Vanco and Zosyn/ceftriaxone and discharged with Keflex. Patient states edema continues to improve   -- Continue Keflex   -- Continue minocycline after completion of Keflex indefinitely for chronic ppx   -- Noted CRP of 207.3 (was 150 at recent discharge) ; trend   -- Trend CBC     History of Recurrent Right Lower Extremity Cellulitis  Chronic Lower Extremity Lymphedema  Chronic Venous Stasis  Chronic Right Lower Extremity Wounds  Patient with history of frequent hospitalizations for sepsis secondary to right lower extremity cellulitis  -- PT for  lymphedema wraps   -- WOC consulted   -- Pain control with Tylenol, PRN Dilaudid   -- Continue PTA Lasix and spironolactone for edema   -- Trend CRP as above and Hgb as below - patient notes has had slight increased bleeding to dressings since discharge      Chronic Anemia  Hiatal Hernia with Brian Lesions   Last hospitalization was complicated by acute on chronic anemia with Hgb drop to 6.2 ; requiring transfusion. GI was consulted and EGD done on 3/21 showed sizable hiatal hernia with Brian lesions with oozing   -- Hgb 8.8 --> 7.8 (was 8.3 at last discharge)   -- Trend CBC  -- GI had previously recommended medical management with PPI and Fe supplementation. May need definitive surgical repair.   -- Low threshold for GI consult if Hgb continues to drop     Thrombocytosis   Noted as of last admission - previously within normal limits - ? Reactive   -- trend CBC      Restless leg syndrome - continue PTA ropinirole  Anxiety depression - continue PTA Wellbutrin and Celexa  Hyperlipidemia - continue PTA statin   GERD - continue PTA PPI    DVT Ppx: wounds to RLE, and pain to bilateral LE unlikely able to tolerate SCDs. Would also hold on pharm ppx given bleeding wounds to RLE and Hgb drop today. Reassess daily        Observation Goals: -diagnostic tests and consults completed and resulted, -vital signs normal or at patient baseline, Nurse to notify provider when observation goals have been met and patient is ready for discharge.  Diet: Advance Diet as Tolerated: Full Liquid Diet; Regular Diet Adult    DVT Prophylaxis: VTE Prophylaxis contraindicated due to open wounds to bilateral lower extremities/pain to bilateral lower extremities ; no pharm ppx given concern for possible GIB   Amato Catheter: Not present  Lines: None     Cardiac Monitoring: None  Code Status: Full Code      Clinically Significant Risk Factors Present on Admission                   # Hypertension: Noted on problem list      # Anemia: based on hgb  "<11       # Severe Obesity: Estimated body mass index is 48.82 kg/m  as calculated from the following:    Height as of 3/12/25: 1.524 m (5').    Weight as of this encounter: 113.4 kg (250 lb).         # Financial/Environmental Concerns:           Social Drivers of Health    Tobacco Use: Medium Risk (3/26/2025)    Patient History     Smoking Tobacco Use: Former     Smokeless Tobacco Use: Never          Disposition Plan     Medically Ready for Discharge: Anticipated in 2-4 Days       The patient's care was discussed with the Attending Physician, Dr. Eliseo Morales who independently met with and assessed the patient and is in agreement with the assessment and plan     Edna Stovall PA-C  Hospitalist Service  [facility]  Securely message with [employer] (more info)  Text page via [employer] Paging/Directory   ______________________________________________________________________    Interval History   Chart reviewed; patient states after returning home from the hospital she was initially managing okay. Made herself some food and was ambulating okay. After eating, had one episode of nausea and small emesis. Later that afternoon and evening had several episodes of loose stools. No abdominal pain. No fever or chills. States left leg continues to improve but has noticed slight increased bleeding and oozing from right leg. Patient states she \"just doesn't feel like herself.\" Is amenable to TCU at this time.     Physical Exam   Vital Signs: Temp: 98.1  F (36.7  C) Temp src: Oral BP: 131/60 Pulse: 84   Resp: 20 SpO2: 94 % O2 Device: None (Room air)    Weight: 250 lbs 0 oz    Constitutional: awake, alert, no apparent distress, and appears stated age  Respiratory: No increased work of breathing, no accessory muscle use, clear to auscultation bilaterally, no crackles or wheezing  Cardiovascular: Regular rate and rhythm, normal S1 and S2  GI: Soft, non-distended, non-tender, normal bowel sounds, no masses palpated, " no hepatosplenomegaly  Skin: Bilateral lower extremities are wrapped however, erythema and edema extending slightly from proximal and distal lower extremities  Musculoskeletal: Bilateral lower extremity edema, right > left  Neurologic: Awake, alert.   Neuropsychiatric: Appropriate mood, affect and eye contact. Cooperative.    Medical Decision Making             Data     I have personally reviewed the following data over the past 24 hrs:    9.0  \   7.8 (L)   / 617 (H)     140 98 16.1 /  91   4.2 34 (H) 0.96 (H) \     TSH: 5.42 (H) T4: 1.33 A1C: N/A     Procal: N/A CRP: 207.30 (H) Lactic Acid: 1.4         Imaging results reviewed over the past 24 hrs:   No results found for this or any previous visit (from the past 24 hours).

## 2025-03-27 NOTE — PLAN OF CARE
Goal Outcome Evaluation:    Problem: Adult Inpatient Plan of Care  Goal: Absence of Hospital-Acquired Illness or Injury  Outcome: Progressing  Intervention: Identify and Manage Fall Risk    Intervention: Prevent Skin Injury     Problem: Adult Inpatient Plan of Care  Goal: Optimal Comfort and Wellbeing  Outcome: Progressing        Pt AO, VSS on RA, moderate pain controlled with PRN tyl. BS of 180ml, voided 250ml. Fluids encouraged, purewick in place.

## 2025-03-27 NOTE — PHARMACY-ADMISSION MEDICATION HISTORY
Pharmacist Admission Medication History    Admission medication history is complete. The information provided in this note is only as accurate as the sources available at the time of the update.    Information Source(s): Patient, Hospital records, and CareEverywhere/SureScripts via in-person    Pertinent Information: Patient was discharged from hospital yesterday.  Medications listed as past week were taken while inpatient at [facility].   The medications with date and time of dose were taken outpatient.    Changes made to PTA medication list:  Added: None  Deleted: None  Changed: None    Allergies reviewed with patient and updates made in EHR: yes    Medication History Completed By: Marcia Zavala [facility] 3/26/2025 8:25 PM    PTA Med List   Medication Sig Note Last Dose/Taking    acetaminophen (TYLENOL) 500 MG tablet Take 1,000 mg by mouth daily as needed for pain.  Past Week    ascorbic acid (VITAMIN C) 250 MG CHEW chewable tablet Take 250 mg by mouth daily.  Past Week    buPROPion (WELLBUTRIN SR) 150 MG 12 hr tablet Take 150 mg by mouth every morning   Past Week    Carboxymethylcellulose Sodium 0.25 % SOLN Apply 1 drop to eye daily as needed  Past Week    cephALEXin (KEFLEX) 500 MG capsule Take 2 capsules (1,000 mg) by mouth every 8 hours for 15 doses.  3/26/2025 Morning    citalopram (CELEXA) 40 MG tablet Take 40 mg by mouth daily (with lunch)  Past Week    ferrous sulfate (FEROSUL) 325 (65 Fe) MG tablet Take 325 mg by mouth every other day.  Past Week    furosemide (LASIX) 20 MG tablet Take 3 tablets (60 mg) by mouth daily.  3/26/2025 Morning    gabapentin (NEURONTIN) 100 MG capsule Take 100 mg by mouth 3 times daily  3/26/2025 Morning    magnesium oxide (MAG-OX) 400 MG tablet Take 1 tablet by mouth every morning  Past Week    [START ON 3/30/2025] minocycline (DYNACIN) 50 MG tablet Take 1 tablet (50 mg) by mouth daily. 3/26/2025: Has NOT started taking this yet. Taking    minocycline (MINOCIN) 100 MG capsule  Take 1 capsule (100 mg) by mouth every 12 hours for 9 doses. Start at 8 pm tonight  3/26/2025 Morning    multivitamin w/minerals (CENTRUM ADULTS) tablet Take 1 tablet by mouth every morning  Past Week    nitroGLYcerin (NITROSTAT) 0.4 MG sublingual tablet PLACE 1 TABLET UNDER TONGUE EVERY 5 MINTUES AS NEEDED FOR CHEST PAIN FOR UP TO 30 DAYS  More than a month    nystatin (MYCOSTATIN) 109290 UNIT/GM external powder Apply topically 2 times daily as needed.  3/26/2025 Morning    pantoprazole (PROTONIX) 40 MG EC tablet Take 1 tablet (40 mg) by mouth 2 times daily.  3/26/2025 Morning    potassium chloride ER (K-TAB) 20 MEQ CR tablet Take 20 mEq by mouth 2 times daily.  Past Week    rOPINIRole (REQUIP) 1 MG tablet Take 0.5 mg by mouth 2 times daily. Take one-half tablet (dose = 0.5 mg) twice daily. Once in the morning and once about dinnertime.     In addition, take one tablet (1 mg) at bedtime.  Past Week    simvastatin (ZOCOR) 40 MG tablet [SIMVASTATIN (ZOCOR) 40 MG TABLET] Take 40 mg by mouth bedtime.  Past Week    spironolactone (ALDACTONE) 25 MG tablet Take 25 mg by mouth every morning  Past Week    vitamin B-Complex Take 1 tablet by mouth daily.  Past Week    vitamin D3 (CHOLECALCIFEROL) 250 mcg (54073 units) capsule Take 1 capsule by mouth daily.  Past Week    Wound Cleansers (VASHE WOUND THERAPY EX) Externally apply topically daily as needed  3/26/2025 Morning    zinc 50 MG TABS Take 1 tablet by mouth three times a week. On Tues, Thur, Sat  Past Week

## 2025-03-27 NOTE — H&P
[facility]    History and Physical  Hospitalist       Date of Admission:  3/26/2025    Assessment & Plan   79 year old female with PMHx of recurrent right lower extremity cellulitis, chronic lower extremity lymphedema, chronic venous stasis, chronic anemia, RLS, HLD, GERD who was admitted on 3/12/2025. She has a history of recurrent hospitalizations for right lower extremity cellulitis. Patient recently admitted 1/12 - 1/18/2025 for RIGHT lower extremity cellulitis and sepsis.  Had been treated with meropenem and vancomycin, ID was consulted and she was discharged on doxycycline.  She presented to the ER today for evaluation of LEFT lower extremity erythema, edema, pain 3/12/25 and advised TCU but discharged home 3/25/25 -- now back requesting TCU placement.     Summary:    Principal Problem:    Cellulitis of left lower extremity   -- has 5 days left of Keflex 100 mg tid and Minocycline 100 mg bid, then go with Minocycline 50 mg daily indefinitely to suppress recurrent cellulitis.    -- will order wound care for left leg and lymphedema wraps if able.       Weakness generalized -- could not take care of herself at home, that's why she is back   -- TCU placement, she is agreeable now.       Diarrhea   -- 3 water stools started today, suspect antibiotic associated, no hx of C. Diff   -- Clear liquid diet for 1-2 days, advance as tolerated.  If persistent diarrhea then check C Diff   -- will stop daily Mag Oxide as it might be contributing to diarrhea.      Acquired lymphedema of leg   -- will involve lymphedema wrapping if able       MS (multiple sclerosis) (H)      Essential hypertension -- on Lasix and Spironolactone, but no antihypertensives now       Morbid obesity -- BMI 48.8      Restless legs -- on requip      Depression -- on Celexa and Wellbutrin       GERD -- on Protonix       Anemia -- anemia of chronic disease, has required transfusions, stable at 8.8      Thrombocytosis -- suspect  "related to left leg infection           Plan: as above    DVT Prophylaxis: Low Risk/Ambulatory with no VTE prophylaxis indicated  Code Status: Full Code    Disposition: Expected discharge tomorrow if TCU available   Medically Ready for Discharge: Anticipated Tomorrow      Other Diagnoses:  Clinically Significant Risk Factors Present on Admission                   # Hypertension: Noted on problem list      # Anemia: based on hgb <11       # Severe Obesity: Estimated body mass index is 48.82 kg/m  as calculated from the following:    Height as of 3/12/25: 1.524 m (5').    Weight as of this encounter: 113.4 kg (250 lb).         # Financial/Environmental Concerns:             Shree Vo MD  Pager: [phone]  Cell Phone:  [phone]     Primary Care Physician   Yung Herrmann    Chief Complaint   Weakness and diarrhea     History is obtained from Patient    History of Present Illness   79 year old female presents to the Emergency Department for evaluation of weakness and \"not feeling right\".  Patient discharged yesterday after 13 days in hospital treating left leg cellulitis, and has lymphedema.  This been a recurrent problem.  Recommendations were for TCU placement however patient thought she would be able to manage at home.  Patient states she is not able to get up and around and take care of yourself as expected and just feels weak and \"poorly\".  Denies any fevers or chills.   Had 3 watery stools in last 24 hours and now agreeable to TCU.  No hx of C diff.  No abd pain.     In ER AVSS, lactate 1.4.      PAST MEDICAL HISTORY    Past Medical History:   Diagnosis Date    Ankle contracture, left     Class 3 severe obesity due to excess calories without serious comorbidity with body mass index (BMI) of 45.0 to 49.9 in adult (H)     Combined gastric and duodenal ulcer     Controlled substance agreement broken     Depression     Dyslipidemia     Edema     Edema     Gait abnormality     GERD " (gastroesophageal reflux disease)     Gout     Lymphedema of both lower extremities     Melanoma (H)     left upper arm    MS (multiple sclerosis) (H)     RLS (restless legs syndrome)     Skin ulcer of left lower leg (H)     Valgus deformity of both feet     Vitamin D deficiency         PAST SURGICAL HISTORY    Past Surgical History:   Procedure Laterality Date    ABLATION SAPHENOUS VEIN W/ RFA Right 04/12/2021    Saphenous vein, anterior accessory saphenous vein, sclerotherapy of multiple veins.    COSMETIC SURGERY  1988    reduction of excess skin from weight loss    ESOPHAGOSCOPY, GASTROSCOPY, DUODENOSCOPY (EGD), COMBINED N/A 03/30/2015    Procedure: UPPER ENDOSCOPY with gastric biopsy;  Surgeon: Checo Fletcher MD;  Location: [facility];  Service:     ESOPHAGOSCOPY, GASTROSCOPY, DUODENOSCOPY (EGD), COMBINED N/A 3/21/2025    Procedure: ESOPHAGOGASTRODUODENOSCOPY;  Surgeon: Mazin Rome DO;  Location: [facility] OR    IRRIGATION AND DEBRIDEMENT LOWER EXTREMITY, COMBINED Right 3/21/2022    Procedure: RIGHT LEG WOUND DEBRIDEMENT, PAULIE DERMATONE, MISONIX, VAC VERA FLOW WITH VASHE;  Surgeon: Gabriele Bullock MD;  Location:  OR    IRRIGATION AND DEBRIDEMENT LOWER EXTREMITY, COMBINED Right 3/28/2022    Procedure: DEBRIDEMENT AND WOUND DRESSING CHANGE AND MYRIAD APPLICATION OF RIGHT LOWER LEG WOUND;  Surgeon: Gabriele Bullock MD;  Location:  OR    MAMMOPLASTY REDUCTION Bilateral     MOHS MICROGRAPHIC PROCEDURE      left upper arm, melanoma    PICC DOUBLE LUMEN PLACEMENT  7/21/2024    PICC DOUBLE LUMEN PLACEMENT  3/20/2025    PICC SINGLE LUMEN PLACEMENT  8/13/2021         PICC SINGLE LUMEN PLACEMENT  9/2/2021         PICC SINGLE LUMEN PLACEMENT  9/8/2024    PICC SINGLE LUMEN PLACEMENT  1/14/2025    SPINE SURGERY      L5 area surgery, decompression        Prior to Admission Medications   Prior to Admission Medications   Prescriptions Last Dose Informant Patient Reported? Taking?    Carboxymethylcellulose Sodium 0.25 % SOLN Past Week  Yes Yes   Sig: Apply 1 drop to eye daily as needed   Wound Cleansers (VASHE WOUND THERAPY EX) 3/26/2025 Morning  Yes Yes   Sig: Externally apply topically daily as needed   acetaminophen (TYLENOL) 500 MG tablet Past Week  Yes Yes   Sig: Take 1,000 mg by mouth daily as needed for pain.   ascorbic acid (VITAMIN C) 250 MG CHEW chewable tablet Past Week  Yes Yes   Sig: Take 250 mg by mouth daily.   buPROPion (WELLBUTRIN SR) 150 MG 12 hr tablet Past Week Self Yes Yes   Sig: Take 150 mg by mouth every morning    cephALEXin (KEFLEX) 500 MG capsule 3/26/2025 Morning  No Yes   Sig: Take 2 capsules (1,000 mg) by mouth every 8 hours for 15 doses.   citalopram (CELEXA) 40 MG tablet Past Week Self Yes Yes   Sig: Take 40 mg by mouth daily (with lunch)   ferrous sulfate (FEROSUL) 325 (65 Fe) MG tablet Past Week  Yes Yes   Sig: Take 325 mg by mouth every other day.   furosemide (LASIX) 20 MG tablet 3/26/2025 Morning  No Yes   Sig: Take 3 tablets (60 mg) by mouth daily.   gabapentin (NEURONTIN) 100 MG capsule 3/26/2025 Morning  Yes Yes   Sig: Take 100 mg by mouth 3 times daily   magnesium oxide (MAG-OX) 400 MG tablet Past Week  Yes Yes   Sig: Take 1 tablet by mouth every morning   minocycline (DYNACIN) 50 MG tablet   No Yes   Sig: Take 1 tablet (50 mg) by mouth daily.   minocycline (MINOCIN) 100 MG capsule 3/26/2025 Morning  No Yes   Sig: Take 1 capsule (100 mg) by mouth every 12 hours for 9 doses. Start at 8 pm tonight   multivitamin w/minerals (CENTRUM ADULTS) tablet Past Week Self Yes Yes   Sig: Take 1 tablet by mouth every morning   nitroGLYcerin (NITROSTAT) 0.4 MG sublingual tablet More than a month  Yes Yes   Sig: PLACE 1 TABLET UNDER TONGUE EVERY 5 MINTUES AS NEEDED FOR CHEST PAIN FOR UP TO 30 DAYS   nystatin (MYCOSTATIN) 448660 UNIT/GM external powder 3/26/2025 Morning  Yes Yes   Sig: Apply topically 2 times daily as needed.   pantoprazole (PROTONIX) 40 MG EC tablet  3/26/2025 Morning  No Yes   Sig: Take 1 tablet (40 mg) by mouth 2 times daily.   potassium chloride ER (K-TAB) 20 MEQ CR tablet Past Week  Yes Yes   Sig: Take 20 mEq by mouth 2 times daily.   rOPINIRole (REQUIP) 1 MG tablet Past Week  Yes Yes   Sig: Take 0.5 mg by mouth 2 times daily. Take one-half tablet (dose = 0.5 mg) twice daily. Once in the morning and once about dinnertime.     In addition, take one tablet (1 mg) at bedtime.   simvastatin (ZOCOR) 40 MG tablet Past Week Self Yes Yes   Sig: [SIMVASTATIN (ZOCOR) 40 MG TABLET] Take 40 mg by mouth bedtime.   spironolactone (ALDACTONE) 25 MG tablet Past Week Self Yes Yes   Sig: Take 25 mg by mouth every morning   vitamin B-Complex Past Week  Yes Yes   Sig: Take 1 tablet by mouth daily.   vitamin D3 (CHOLECALCIFEROL) 250 mcg (38545 units) capsule Past Week  Yes Yes   Sig: Take 1 capsule by mouth daily.   zinc 50 MG TABS Past Week  Yes Yes   Sig: Take 1 tablet by mouth three times a week. On Tues, Thur, Sat      Facility-Administered Medications: None     Allergies   Allergies   Allergen Reactions    Other Environmental Allergy Anaphylaxis     Bees/Wasps Stings  Bees/Wasps Stings    Adhesive Tape Other (See Comments)     Red,itchy and oozes  Red,itchy and oozes    Adhesive [Cyanoacrylate] Unknown     Other reaction(s): sores    Latex Hives    Ragweeds Itching    Oxycodone-Acetaminophen Rash    Vicodin [Hydrocodone-Acetaminophen] Nausea and Vomiting and Hives       SOCIAL HISTORY    Social History     Social History Narrative    .   Has significant other.  2 grown children.  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  (last updated 3/26/2025)       Social History     Tobacco Use    Smoking status: Former     Current packs/day: 0.00     Average packs/day: 0.3 packs/day for 12.0 years (3.0 ttl pk-yrs)     Types: Cigarettes     Start date: 12/16/1963     Quit date: 12/16/1975     Years since  quittin.3    Smokeless tobacco: Never    Tobacco comments:     quit more than 30 years ago   Substance Use Topics    Alcohol use: Yes     Alcohol/week: 1.0 standard drink of alcohol     Types: 1 Standard drinks or equivalent per week     Comment: glass of wine once a week    Drug use: No        FAMILY HISTORY    Family History   Problem Relation Age of Onset    Uterine Cancer Mother     Hyperlipidemia Mother     Hypertension Mother     Multiple Sclerosis Mother     Asthma Sister     Obesity Sister     Hyperlipidemia Sister     Hypertension Sister     Multiple Sclerosis Sister     Arthritis Sister     Colon Cancer Paternal Aunt     Breast Cancer Paternal Aunt     Hypertension Paternal Aunt     Hyperlipidemia Paternal Aunt     Brain Cancer Father     Arthritis Maternal Uncle     Arthritis Maternal Grandmother     Early Death Maternal Grandfather     Arthritis Paternal Grandmother     Arthritis Paternal Grandfather         Review of Systems   The 10 point Review of Systems is negative other than noted in the HPI or here.       PHYSICAL EXAM     Temp: 98.5  F (36.9  C) Temp src: Oral BP: 129/56 Pulse: 84   Resp: 24 SpO2: 97 %      Vital Signs with Ranges  Temp:  [98.5  F (36.9  C)] 98.5  F (36.9  C)  Pulse:  [84-89] 84  Resp:  [24] 24  BP: (129-146)/(56-80) 129/56  SpO2:  [94 %-100 %] 97 %  250 lbs 0 oz    Constitutional: Awake, alert, cooperative, no apparent distress.  Eyes: Conjunctiva and pupils examined and normal.  HEENT: Moist mucous membranes, normal dentition.  Respiratory: Clear to auscultation bilaterally, no crackles or wheezing.  Cardiovascular: Regular rate and rhythm, normal S1 and S2, and no murmur noted, no carotid bruits. Bilateral leg lymphedema.  GI: Soft, non-distended, non-tender, normal bowel sounds.  Lymph/Hematologic: No anterior cervical, supraclavicular or axillary adenopathy.  Skin: No rashes, no cyanosis.   Musculoskeletal: No joint swelling, erythema or tenderness.  Neurologic: Alert,  Ox3, Cranial nerves 2-12 intact, no focal weakness or numbness but generalized weakness.   Psychiatric:  No obvious anxiety or depression.    Data   Data reviewed today:  I personally reviewed no images or EKG's today.  Recent Labs   Lab 03/26/25 2054 03/25/25 0636 03/24/25 0924 03/24/25 0636 03/23/25  0539 03/22/25  0514 03/21/25  0553 03/20/25 2059   WBC 10.4 8.3  --   --   --  9.6 12.0*  --    HGB 8.8* 8.3* 7.8*  --  7.4* 7.5* 7.8*  --    MCV 83 83  --   --   --  83 83  --    * 639*  --  573*  --  567* 572*  --    NA  --   --   --   --   --  137  --   --    POTASSIUM  --   --   --   --   --  3.5  --   --    CHLORIDE  --   --   --   --   --  99  --   --    CO2  --   --   --   --   --  31*  --   --    BUN  --   --   --   --   --  17.7  --   --    CR  --   --   --   --  0.85 0.86 0.94  --    ANIONGAP  --   --   --   --   --  7  --   --    JUNI  --   --   --   --   --  8.6*  --   --    GLC  --   --   --   --   --  98  --  126*       Imaging:  No results found for this or any previous visit (from the past 24 hours).

## 2025-03-27 NOTE — ED NOTES
Unable to get see veins for IV using ultrasound, pt was recently hospitalized and had a midline/picc line due to difficult iv start. PICC paged. Dr. Moser informed.

## 2025-03-27 NOTE — ED TRIAGE NOTES
Pt d/c'd from the hospital yesterday where she had been admitted with cellulitis. Since going home, she has become weaker and has been struggling with nausea, vomiting, and diarrhea today.      Triage Assessment (Adult)       Row Name 03/26/25 1910          Triage Assessment    Airway WDL WDL        Respiratory WDL    Respiratory WDL WDL        Skin Circulation/Temperature WDL    Skin Circulation/Temperature WDL X  lower extremity cellulitis        Cardiac WDL    Cardiac WDL WDL        Peripheral/Neurovascular WDL    Peripheral Neurovascular WDL WDL        Cognitive/Neuro/Behavioral WDL    Cognitive/Neuro/Behavioral WDL WDL

## 2025-03-27 NOTE — ED PROVIDER NOTES
"EMERGENCY DEPARTMENT ENCOUNTER      NAME: Elizabeth Kelley  AGE: 79 year old female  YOB: 1945  MRN: [id number]  EVALUATION DATE & TIME: No admission date for patient encounter.    PCP: Yung Herrmann    ED PROVIDER: Ajay Moser M.D.      Chief Complaint   Patient presents with    Generalized Weakness         FINAL IMPRESSION:  Weakness  Diarrhea  Chronic lower extremity ulcers      ED COURSE & MEDICAL DECISION MAKING:    Pertinent Labs & Imaging studies reviewed. (See chart for details)  79 year old female presents to the Emergency Department for evaluation of weakness and \"not feeling right\".  Patient discharged yesterday after nearly 2-week hospital stay for severe cellulitis of her lower extremities.  This been a recurrent problem.  Recommendations were for TCU placement however patient thought she would be able to manage at home.  Patient states she is not able to get up and around and take care of yourself as expected and just feels weak and \"poorly\".  Denies any fevers or chills.  Reports no urinary symptoms.  Did start some diarrheal stools last night.  Patient is no an elderly female in mild distress.  Vital signs reassuring.  Heart and lungs normal.  Abdomen obese.  Lower extremities with marked edema and dressings in place.  Patient with general weakness and unable to care for self at home after recent prolonged hospitalization.  Plan will be for observation status with TCU placement.  Patient is agreeable.. Patient appears non toxic with stable vitals signs.     7:57 PM.  I met with the patient for the initial interview and physical examination. Discussed plan for treatment and workup in the ED.    8:47 PM.  I spoke to the admitting hospitalist, Dr. Stephen.  9:36 PM.  TSH slightly elevated 5.42 but not contributory.  C-reactive protein significantly elevated 207.3.  White cell count normal at 10.8.  Hemoglobin reduced at 8.8 which is slightly higher than most recent value.  Magnesium and " lactic acid normal.  At the conclusion of the encounter I discussed the results of all of the tests and the disposition. The questions were answered and return precautions provided. The patient or family acknowledged understanding and was agreeable with the care plan.         MEDICATIONS GIVEN IN THE EMERGENCY:  Medications - No data to display    NEW PRESCRIPTIONS STARTED AT TODAY'S ER VISIT  New Prescriptions    No medications on file          =================================================================    HPI    Patient information was obtained from: Patient    Use of Intrepreter: N/A         Elizabeth Kelley is a 79 year old female with a pertient medical history of hyperlipidemia, hypertension, obesity, sepsis, skin cancer, and lymphedema who presents to the ED by EMS for evaluation of generalized weakness.     Patient shares she was admitted to the hospital on 3/12/2025 and discharged yesterday (3/25/2025). The option of being discharged to a TCU was discussed but she decided to be discharged home.     At home, the patient took her prescribed medications, went to bed, woke up in the middle of the night not feeling well and had a bowel movement. When she woke up this morning, she had 2 episodes of diarrhea, and multiple episodes of nausea and vomiting. She also reports feeling generally weak. Due to these symptoms, she presents to the ED for further evaluation.     Denies any fevers, constipation, or shortness of breath. No chest or abdominal pain. No other concerns are mentioned at this time.     Per chart review, patient was admitted from 3/12-3/25/2025 at [facility] for cellulitis of left lower extremity. US was negative for DVT. Started on Vanco/Zosyn and ID consulted. On 3/20/2025, the patient underwent a PICC line insertion procedure and on 3/21/2025 she underwent a esophagogastroduodenoscopy. Patient was discharged home in stable condition, and prescribed keflex and  minocycline.       REVIEW OF SYSTEMS   Constitutional:  Denies fever, chills. Reports generalized weakness.   Respiratory:  Denies productive cough or increased work of breathing  Cardiovascular:  Denies chest pain, palpitations  GI:  Denies abdominal pain or change in bladder habits. Reports nausea, vomiting, and diarrhea.   Musculoskeletal:  Denies any new muscle/joint swelling  Skin:  Denies rash   Neurologic:  Denies focal weakness  All systems negative except as marked.     PAST MEDICAL HISTORY:  Past Medical History:   Diagnosis Date    Ankle contracture, left     Class 3 severe obesity due to excess calories without serious comorbidity with body mass index (BMI) of 45.0 to 49.9 in adult (H)     Combined gastric and duodenal ulcer     Controlled substance agreement broken     Depression     Dyslipidemia     Edema     Edema     Gait abnormality     GERD (gastroesophageal reflux disease)     Gout     Lymphedema of both lower extremities     Melanoma (H)     left upper arm    MS (multiple sclerosis) (H)     RLS (restless legs syndrome)     Skin ulcer of left lower leg (H)     Valgus deformity of both feet     Vitamin D deficiency        PAST SURGICAL HISTORY:  Past Surgical History:   Procedure Laterality Date    ABLATION SAPHENOUS VEIN W/ RFA Right 04/12/2021    Saphenous vein, anterior accessory saphenous vein, sclerotherapy of multiple veins.    COSMETIC SURGERY  1988    reduction of excess skin from weight loss    ESOPHAGOSCOPY, GASTROSCOPY, DUODENOSCOPY (EGD), COMBINED N/A 03/30/2015    Procedure: UPPER ENDOSCOPY with gastric biopsy;  Surgeon: Checo Fletcher MD;  Location: [facility];  Service:     ESOPHAGOSCOPY, GASTROSCOPY, DUODENOSCOPY (EGD), COMBINED N/A 3/21/2025    Procedure: ESOPHAGOGASTRODUODENOSCOPY;  Surgeon: Mazin Rome DO;  Location: [facility] OR    IRRIGATION AND DEBRIDEMENT LOWER EXTREMITY, COMBINED Right 3/21/2022    Procedure: RIGHT LEG WOUND DEBRIDEMENT, [facility]  DERMATONE, MISONIX, VAC VERA FLOW WITH VASHE;  Surgeon: Gabriele Bullock MD;  Location: [facility] OR    IRRIGATION AND DEBRIDEMENT LOWER EXTREMITY, COMBINED Right 3/28/2022    Procedure: DEBRIDEMENT AND WOUND DRESSING CHANGE AND MYRIAD APPLICATION OF RIGHT LOWER LEG WOUND;  Surgeon: Gabriele Bullock MD;  Location: [facility] OR    MAMMOPLASTY REDUCTION Bilateral     MOHS MICROGRAPHIC PROCEDURE      left upper arm, melanoma    PICC DOUBLE LUMEN PLACEMENT  7/21/2024    PICC DOUBLE LUMEN PLACEMENT  3/20/2025    PICC SINGLE LUMEN PLACEMENT  8/13/2021         PICC SINGLE LUMEN PLACEMENT  9/2/2021         PICC SINGLE LUMEN PLACEMENT  9/8/2024    PICC SINGLE LUMEN PLACEMENT  1/14/2025    SPINE SURGERY      L5 area surgery, decompression         CURRENT MEDICATIONS:    No current facility-administered medications for this encounter.    Current Outpatient Medications:     acetaminophen (TYLENOL) 500 MG tablet, Take 1,000 mg by mouth daily as needed for pain., Disp: , Rfl:     ascorbic acid (VITAMIN C) 250 MG CHEW chewable tablet, Take 250 mg by mouth daily., Disp: , Rfl:     buPROPion (WELLBUTRIN SR) 150 MG 12 hr tablet, Take 150 mg by mouth every morning , Disp: , Rfl:     Carboxymethylcellulose Sodium 0.25 % SOLN, Apply 1 drop to eye daily as needed, Disp: , Rfl:     cephALEXin (KEFLEX) 500 MG capsule, Take 2 capsules (1,000 mg) by mouth every 8 hours for 15 doses., Disp: 30 capsule, Rfl: 0    citalopram (CELEXA) 40 MG tablet, Take 40 mg by mouth daily (with lunch), Disp: , Rfl:     ferrous sulfate (FEROSUL) 325 (65 Fe) MG tablet, Take 325 mg by mouth every other day., Disp: , Rfl:     furosemide (LASIX) 20 MG tablet, Take 3 tablets (60 mg) by mouth daily., Disp: 30 tablet, Rfl: 1    gabapentin (NEURONTIN) 100 MG capsule, Take 100 mg by mouth 3 times daily, Disp: , Rfl:     magnesium oxide (MAG-OX) 400 MG tablet, Take 1 tablet by mouth every morning, Disp: , Rfl:     [START ON 3/30/2025] minocycline (DYNACIN) 50 MG tablet, Take 1  tablet (50 mg) by mouth daily., Disp: 90 tablet, Rfl: 3    minocycline (MINOCIN) 100 MG capsule, Take 1 capsule (100 mg) by mouth every 12 hours for 9 doses. Start at 8 pm tonight, Disp: 9 capsule, Rfl: 0    multivitamin w/minerals (CENTRUM ADULTS) tablet, Take 1 tablet by mouth every morning, Disp: , Rfl:     nitroGLYcerin (NITROSTAT) 0.4 MG sublingual tablet, PLACE 1 TABLET UNDER TONGUE EVERY 5 MINTUES AS NEEDED FOR CHEST PAIN FOR UP TO 30 DAYS, Disp: , Rfl:     nystatin (MYCOSTATIN) 462300 UNIT/GM external powder, Apply topically 2 times daily as needed., Disp: , Rfl:     pantoprazole (PROTONIX) 40 MG EC tablet, Take 1 tablet (40 mg) by mouth 2 times daily., Disp: 60 tablet, Rfl: 1    potassium chloride ER (K-TAB) 20 MEQ CR tablet, Take 20 mEq by mouth 2 times daily., Disp: , Rfl:     rOPINIRole (REQUIP) 1 MG tablet, Take 0.5 mg by mouth 2 times daily. Take one-half tablet (dose = 0.5 mg) twice daily. Once in the morning and once about dinnertime.   In addition, take one tablet (1 mg) at bedtime., Disp: , Rfl:     simvastatin (ZOCOR) 40 MG tablet, [SIMVASTATIN (ZOCOR) 40 MG TABLET] Take 40 mg by mouth bedtime., Disp: , Rfl:     spironolactone (ALDACTONE) 25 MG tablet, Take 25 mg by mouth every morning, Disp: , Rfl:     vitamin B-Complex, Take 1 tablet by mouth daily., Disp: , Rfl:     vitamin D3 (CHOLECALCIFEROL) 250 mcg (96694 units) capsule, Take 1 capsule by mouth daily., Disp: , Rfl:     Wound Cleansers (VASHE WOUND THERAPY EX), Externally apply topically daily as needed, Disp: , Rfl:     zinc 50 MG TABS, Take 1 tablet by mouth three times a week. On Tues, Thur, Sat, Disp: , Rfl:     ALLERGIES:  Allergies   Allergen Reactions    Other Environmental Allergy Anaphylaxis     Bees/Wasps Stings  Bees/Wasps Stings    Adhesive Tape Other (See Comments)     Red,itchy and oozes  Red,itchy and oozes    Adhesive [Cyanoacrylate] Unknown     Other reaction(s): sores    Latex Hives    Ragweeds Itching     Oxycodone-Acetaminophen Rash    Vicodin [Hydrocodone-Acetaminophen] Nausea and Vomiting and Hives       FAMILY HISTORY:  Family History   Problem Relation Age of Onset    Uterine Cancer Mother     Hyperlipidemia Mother     Hypertension Mother     Multiple Sclerosis Mother     Asthma Sister     Obesity Sister     Hyperlipidemia Sister     Hypertension Sister     Multiple Sclerosis Sister     Arthritis Sister     Colon Cancer Paternal Aunt     Breast Cancer Paternal Aunt     Hypertension Paternal Aunt     Hyperlipidemia Paternal Aunt     Brain Cancer Father     Arthritis Maternal Uncle     Arthritis Maternal Grandmother     Early Death Maternal Grandfather     Arthritis Paternal Grandmother     Arthritis Paternal Grandfather        SOCIAL HISTORY:   Social History     Socioeconomic History    Marital status: Single     Spouse name: None    Number of children: None    Years of education: None    Highest education level: None   Tobacco Use    Smoking status: Former     Current packs/day: 0.00     Average packs/day: 0.3 packs/day for 12.0 years (3.0 ttl pk-yrs)     Types: Cigarettes     Start date: 1963     Quit date: 1975     Years since quittin.3    Smokeless tobacco: Never    Tobacco comments:     quit more than 30 years ago   Substance and Sexual Activity    Alcohol use: Yes     Alcohol/week: 1.0 standard drink of alcohol     Types: 1 Standard drinks or equivalent per week     Comment: glass of wine once a week    Drug use: No    Sexual activity: Yes     Partners: Male     Birth control/protection: Post-menopausal   Social History Narrative    .   Has significant other.  2 grown children.  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       Social Drivers of Health     Financial Resource Strain: Low Risk  (3/14/2025)    Financial Resource Strain     Within the past 12 months, have you or your family members you live with been unable to get utilities (heat, electricity) when it was really needed?:  No   Food Insecurity: Low Risk  (3/14/2025)    Food Insecurity     Within the past 12 months, did you worry that your food would run out before you got money to buy more?: No     Within the past 12 months, did the food you bought just not last and you didn t have money to get more?: No   Transportation Needs: Low Risk  (3/14/2025)    Transportation Needs     Within the past 12 months, has lack of transportation kept you from medical appointments, getting your medicines, non-medical meetings or appointments, work, or from getting things that you need?: No   Interpersonal Safety: Low Risk  (3/14/2025)    Interpersonal Safety     Do you feel physically and emotionally safe where you currently live?: Yes     Within the past 12 months, have you been hit, slapped, kicked or otherwise physically hurt by someone?: No     Within the past 12 months, have you been humiliated or emotionally abused in other ways by your partner or ex-partner?: No   Housing Stability: Low Risk  (3/14/2025)    Housing Stability     Do you have housing? : Yes     Are you worried about losing your housing?: No       VITALS:  Patient Vitals for the past 24 hrs:   BP Temp Temp src Pulse Resp SpO2 Weight   03/26/25 2017 132/80 -- -- 86 -- 94 % --   03/26/25 2002 134/63 -- -- 85 -- 98 % --   03/26/25 1951 135/64 -- -- 89 -- 95 % --   03/26/25 1907 (!) 146/66 98.5  F (36.9  C) Oral 88 24 100 % 113.4 kg (250 lb)        PHYSICAL EXAM    Constitutional:  Awake, alert, in mild distress.   HENT:  Normocephalic, Atraumatic. Bilateral external ears normal. Oropharynx moist. Nose normal. Neck- Normal range of motion with no guarding, Supple, No stridor.   Eyes:  PERRL, EOMI with no signs of entrapment, Conjunctiva normal, No discharge.   Respiratory:  Normal breath sounds, No respiratory distress, No wheezing.    Cardiovascular:  Normal heart rate, Normal rhythm, No appreciable rubs or gallops. 2/6 systolic murmur.   GI:  Soft, No tenderness, No distension, No  palpable masses  Musculoskeletal: Moderate lower extremity edema. Good range of motion in all major joints. No t major deformities noted. Bilateral legs are bandaged.   Neurologic:  Alert & oriented, Normal motor function, Normal sensory function, No focal deficits noted.   Psychiatric:  Affect normal, Judgment normal, Mood normal.     LAB:  All pertinent labs reviewed and interpreted.     Results for orders placed or performed during the hospital encounter of 03/26/25   Lactic acid whole blood with 1x repeat in 2 hr when >2     Status: Normal   Result Value Ref Range    Lactic Acid, Initial 1.4 0.7 - 2.0 mmol/L   CBC (+ platelets, no diff)     Status: Abnormal   Result Value Ref Range    WBC Count 10.4 4.0 - 11.0 10e3/uL    RBC Count 3.46 (L) 3.80 - 5.20 10e6/uL    Hemoglobin 8.8 (L) 11.7 - 15.7 g/dL    Hematocrit 28.6 (L) 35.0 - 47.0 %    MCV 83 78 - 100 fL    MCH 25.4 (L) 26.5 - 33.0 pg    MCHC 30.8 (L) 31.5 - 36.5 g/dL    RDW 18.6 (H) 10.0 - 15.0 %    Platelet Count 698 (H) 150 - 450 10e3/uL   Magnesium     Status: Normal   Result Value Ref Range    Magnesium 1.9 1.7 - 2.3 mg/dL   CRP inflammation     Status: Abnormal   Result Value Ref Range    CRP Inflammation 207.30 (H) <5.00 mg/L   TSH with free T4 reflex     Status: Abnormal   Result Value Ref Range    TSH 5.42 (H) 0.30 - 4.20 uIU/mL        I, Mellissa Ortiz, am serving as a scribe to document services personally performed by Ajay Moser MD, based on my observation and the provider's statements to me. I, Ajay Moser MD attest that Mellissa Ortiz is acting in a scribe capacity, has observed my performance of the services and has documented them in accordance with my direction.    Ajay Moser M.D.  Emergency Medicine  [facility] EMERGENCY DEPARTMENT     Ajay Moser MD  03/26/25 2425

## 2025-03-28 ENCOUNTER — APPOINTMENT (OUTPATIENT)
Dept: OCCUPATIONAL THERAPY | Facility: HOSPITAL | Age: 80
End: 2025-03-28
Payer: COMMERCIAL

## 2025-03-28 ENCOUNTER — APPOINTMENT (OUTPATIENT)
Dept: PHYSICAL THERAPY | Facility: HOSPITAL | Age: 80
End: 2025-03-28
Attending: INTERNAL MEDICINE
Payer: COMMERCIAL

## 2025-03-28 LAB
ANION GAP SERPL CALCULATED.3IONS-SCNC: 7 MMOL/L (ref 7–15)
BUN SERPL-MCNC: 13.3 MG/DL (ref 8–23)
CALCIUM SERPL-MCNC: 8.5 MG/DL (ref 8.8–10.4)
CHLORIDE SERPL-SCNC: 98 MMOL/L (ref 98–107)
CREAT SERPL-MCNC: 1.07 MG/DL (ref 0.51–0.95)
CRP SERPL-MCNC: 164.7 MG/L
EGFRCR SERPLBLD CKD-EPI 2021: 53 ML/MIN/1.73M2
ERYTHROCYTE [DISTWIDTH] IN BLOOD BY AUTOMATED COUNT: 18.5 % (ref 10–15)
GLUCOSE SERPL-MCNC: 95 MG/DL (ref 70–99)
HCO3 SERPL-SCNC: 33 MMOL/L (ref 22–29)
HCT VFR BLD AUTO: 25.7 % (ref 35–47)
HGB BLD-MCNC: 7.7 G/DL (ref 11.7–15.7)
MCH RBC QN AUTO: 25.2 PG (ref 26.5–33)
MCHC RBC AUTO-ENTMCNC: 30 G/DL (ref 31.5–36.5)
MCV RBC AUTO: 84 FL (ref 78–100)
PLATELET # BLD AUTO: 587 10E3/UL (ref 150–450)
POTASSIUM SERPL-SCNC: 4.5 MMOL/L (ref 3.4–5.3)
RBC # BLD AUTO: 3.06 10E6/UL (ref 3.8–5.2)
SODIUM SERPL-SCNC: 138 MMOL/L (ref 135–145)
WBC # BLD AUTO: 6.5 10E3/UL (ref 4–11)

## 2025-03-28 PROCEDURE — 97535 SELF CARE MNGMENT TRAINING: CPT | Mod: GP

## 2025-03-28 PROCEDURE — 250N000013 HC RX MED GY IP 250 OP 250 PS 637

## 2025-03-28 PROCEDURE — 250N000013 HC RX MED GY IP 250 OP 250 PS 637: Performed by: INTERNAL MEDICINE

## 2025-03-28 PROCEDURE — 97535 SELF CARE MNGMENT TRAINING: CPT | Mod: GO

## 2025-03-28 PROCEDURE — 36415 COLL VENOUS BLD VENIPUNCTURE: CPT

## 2025-03-28 PROCEDURE — 97530 THERAPEUTIC ACTIVITIES: CPT | Mod: GP

## 2025-03-28 PROCEDURE — 80048 BASIC METABOLIC PNL TOTAL CA: CPT

## 2025-03-28 PROCEDURE — 97162 PT EVAL MOD COMPLEX 30 MIN: CPT | Mod: GP

## 2025-03-28 PROCEDURE — G0378 HOSPITAL OBSERVATION PER HR: HCPCS

## 2025-03-28 PROCEDURE — 99232 SBSQ HOSP IP/OBS MODERATE 35: CPT | Performed by: INTERNAL MEDICINE

## 2025-03-28 PROCEDURE — 86140 C-REACTIVE PROTEIN: CPT

## 2025-03-28 PROCEDURE — 85027 COMPLETE CBC AUTOMATED: CPT

## 2025-03-28 RX ORDER — NALOXONE HYDROCHLORIDE 0.4 MG/ML
0.2 INJECTION, SOLUTION INTRAMUSCULAR; INTRAVENOUS; SUBCUTANEOUS
Status: DISCONTINUED | OUTPATIENT
Start: 2025-03-28 | End: 2025-03-29 | Stop reason: HOSPADM

## 2025-03-28 RX ORDER — NALOXONE HYDROCHLORIDE 0.4 MG/ML
0.4 INJECTION, SOLUTION INTRAMUSCULAR; INTRAVENOUS; SUBCUTANEOUS
Status: DISCONTINUED | OUTPATIENT
Start: 2025-03-28 | End: 2025-03-29 | Stop reason: HOSPADM

## 2025-03-28 RX ADMIN — ACETAMINOPHEN 650 MG: 325 TABLET ORAL at 22:00

## 2025-03-28 RX ADMIN — HYDROMORPHONE HYDROCHLORIDE 2 MG: 2 TABLET ORAL at 04:58

## 2025-03-28 RX ADMIN — THERA TABS 1 TABLET: TAB at 09:21

## 2025-03-28 RX ADMIN — HYDROMORPHONE HYDROCHLORIDE 2 MG: 2 TABLET ORAL at 22:00

## 2025-03-28 RX ADMIN — CEPHALEXIN 1000 MG: 500 CAPSULE ORAL at 13:55

## 2025-03-28 RX ADMIN — ROPINIROLE HYDROCHLORIDE 0.5 MG: 0.25 TABLET, FILM COATED ORAL at 22:00

## 2025-03-28 RX ADMIN — HYDROMORPHONE HYDROCHLORIDE 2 MG: 2 TABLET ORAL at 16:51

## 2025-03-28 RX ADMIN — FUROSEMIDE 60 MG: 20 TABLET ORAL at 09:21

## 2025-03-28 RX ADMIN — WHITE PETROLATUM: 1.75 OINTMENT TOPICAL at 09:24

## 2025-03-28 RX ADMIN — GABAPENTIN 100 MG: 100 CAPSULE ORAL at 09:21

## 2025-03-28 RX ADMIN — CEPHALEXIN 1000 MG: 500 CAPSULE ORAL at 06:31

## 2025-03-28 RX ADMIN — GABAPENTIN 100 MG: 100 CAPSULE ORAL at 22:00

## 2025-03-28 RX ADMIN — Medication 1 CAPSULE: at 22:00

## 2025-03-28 RX ADMIN — CITALOPRAM HYDROBROMIDE 40 MG: 40 TABLET, FILM COATED ORAL at 12:31

## 2025-03-28 RX ADMIN — POTASSIUM CHLORIDE 20 MEQ: 1500 TABLET, EXTENDED RELEASE ORAL at 22:00

## 2025-03-28 RX ADMIN — PANTOPRAZOLE SODIUM 40 MG: 40 TABLET, DELAYED RELEASE ORAL at 16:51

## 2025-03-28 RX ADMIN — POTASSIUM CHLORIDE 20 MEQ: 1500 TABLET, EXTENDED RELEASE ORAL at 09:21

## 2025-03-28 RX ADMIN — SPIRONOLACTONE 25 MG: 25 TABLET, FILM COATED ORAL at 09:21

## 2025-03-28 RX ADMIN — ACETAMINOPHEN 650 MG: 325 TABLET ORAL at 04:58

## 2025-03-28 RX ADMIN — Medication 1 CAPSULE: at 09:28

## 2025-03-28 RX ADMIN — Medication 250 MG: at 09:21

## 2025-03-28 RX ADMIN — ACETAMINOPHEN 650 MG: 325 TABLET ORAL at 16:51

## 2025-03-28 RX ADMIN — PANTOPRAZOLE SODIUM 40 MG: 40 TABLET, DELAYED RELEASE ORAL at 06:31

## 2025-03-28 RX ADMIN — GABAPENTIN 100 MG: 100 CAPSULE ORAL at 13:55

## 2025-03-28 RX ADMIN — ROPINIROLE HYDROCHLORIDE 0.5 MG: 0.25 TABLET, FILM COATED ORAL at 09:22

## 2025-03-28 RX ADMIN — BUPROPION HYDROCHLORIDE 150 MG: 150 TABLET, FILM COATED, EXTENDED RELEASE ORAL at 09:21

## 2025-03-28 RX ADMIN — SIMVASTATIN 40 MG: 40 TABLET, FILM COATED ORAL at 22:00

## 2025-03-28 RX ADMIN — CEPHALEXIN 1000 MG: 500 CAPSULE ORAL at 22:00

## 2025-03-28 ASSESSMENT — ACTIVITIES OF DAILY LIVING (ADL)
ADLS_ACUITY_SCORE: 50
ADLS_ACUITY_SCORE: 52
ADLS_ACUITY_SCORE: 50
ADLS_ACUITY_SCORE: 50
ADLS_ACUITY_SCORE: 51
ADLS_ACUITY_SCORE: 50
ADLS_ACUITY_SCORE: 51
ADLS_ACUITY_SCORE: 50
ADLS_ACUITY_SCORE: 50
ADLS_ACUITY_SCORE: 51
ADLS_ACUITY_SCORE: 51
ADLS_ACUITY_SCORE: 50
ADLS_ACUITY_SCORE: 52
ADLS_ACUITY_SCORE: 51
ADLS_ACUITY_SCORE: 51
ADLS_ACUITY_SCORE: 50
ADLS_ACUITY_SCORE: 51
ADLS_ACUITY_SCORE: 51
ADLS_ACUITY_SCORE: 50

## 2025-03-28 NOTE — PLAN OF CARE
Goal Outcome Evaluation:             Pt was given prn Dilaudid, 2 mg PO and tylenol prior to wound dressing change. Pt tolerated the dressing change somewhat.

## 2025-03-28 NOTE — PROGRESS NOTES
Care Management Follow Up    Length of Stay (days): 0    Expected Discharge Date: 03/28/2025    Anticipated Discharge Plan:  Transitional Care    Transportation: TBD    PT Recommendations:    OT Recommendations:  Transitional Care Facility     Barriers to Discharge: medical stability, placement    Prior Living Situation: house with significant other    Discussed  Partnership in Safe Discharge Planning  document with patient/family: No     Handoff Completed: No, handoff not indicated or clinically appropriate    Patient/Spokesperson Updated: Yes. Who? pt    Additional Information:  CM met with pt in room to discuss discharge planning. Pt is wanting to go to TCU. OT rec is TCU, PT rec is pending.     Pt asked for referrals to be sent to [facility], [facility], [facility], [facility], [facility], and [facility]. Referrals sent and pending.    Pt will need evicore auth. CM waiting for PT recs before submitting auth.     Next Steps: submit evicore auth once seen by PT, placement, PAS    12:10 PM  Pt accepted at [facility], [facility], and [facility].     [employer] auth sent and pending. Request ID [id number].     CM met with pt in room and provided accepting facilties. Pt states she prefers [facility]. Pt also states she will need medical transport and is agreeable to potential cost.      [facility] w/c transport scheduled for tomorrow at 10:40-11:20.     [employer] auth approved. #[id number] from 3/28-4/1. CM updated pt and TCU of ride time.    Angelica Jose, SW

## 2025-03-28 NOTE — PROGRESS NOTES
"PRIMARY DIAGNOSIS: \"GENERIC\" NURSING  OUTPATIENT/OBSERVATION GOALS TO BE MET BEFORE DISCHARGE:  ADLs back to baseline: No    Activity and level of assistance: Up with maximum assistance. Consider SW and/or PT evaluation.     Pain status: Improved-controlled with oral pain medications.    Return to near baseline physical activity: No     Discharge Planner Nurse   Safe discharge environment identified: No  Barriers to discharge: Yes       Entered by: Angelica Solomon RN 03/28/2025 10:02 AM     Please review provider order for any additional goals.   Nurse to notify provider when observation goals have been met and patient is ready for discharge.   "

## 2025-03-28 NOTE — PROGRESS NOTES
"PRIMARY DIAGNOSIS: \"GENERIC\" NURSING  OUTPATIENT/OBSERVATION GOALS TO BE MET BEFORE DISCHARGE:  ADLs back to baseline: No    Activity and level of assistance: Up with maximum assistance. Consider SW and/or PT evaluation.     Pain status: Improved-controlled with oral pain medications.    Return to near baseline physical activity: No     Discharge Planner Nurse   Safe discharge environment identified: No  Barriers to discharge: Yes       Entered by: Alex Martinez RN 03/28/2025 12:35 AM     Please review provider order for any additional goals.   Nurse to notify provider when observation goals have been met and patient is ready for discharge.  "

## 2025-03-28 NOTE — PROGRESS NOTES
03/28/25 0910   Appointment Info   Signing Clinician's Name / Credentials (PT) Christina Yi DPT   Quick Adds   Quick Adds Edema Eval;Certification   Living Environment   People in Home significant other   Current Living Arrangements house   Home Accessibility stairs to enter home   Number of Stairs, Main Entrance 4;5   Stair Railings, Main Entrance railings safe and in good condition   Transportation Anticipated family or friend will provide   Self-Care   Usual Activity Tolerance moderate   Current Activity Tolerance poor   Equipment Currently Used at Home cane, straight;walker, standard   Fall history within last six months no   General Information   Onset of Illness/Injury or Date of Surgery 03/26/25   Referring Physician Shree Wilson MD   Patient/Family Therapy Goals Statement (PT) none   Pertinent History of Current Problem (include personal factors and/or comorbidities that impact the POC) 79 year old female with PMHx of recurrent right lower extremity cellulitis, chronic lower extremity lymphedema, chronic venous stasis, chronic anemia, RLS, HLD, GERD who was admitted on 3/12/2025. She has a history of recurrent hospitalizations for right lower extremity cellulitis. Patient recently admitted 1/12 - 1/18/2025 for RIGHT lower extremity cellulitis and sepsis, treated with meropenem and vancomycin, ID was consulted and she was discharged on doxycycline.  She was again hospitalized 3/12 - 3/25 for LEFT lower extremity cellulitis. Treated with Vanco and Zosyn/ceftriaxone and discharged with PO Keflex. Recommendations were to discharge to TCU but patient declined. She returned to the ER on 3/26/25 for inability to care for self at home and new onset diarrhea.   Existing Precautions/Restrictions fall   Integumentary/Edema   Onset of Edema   (chronic)   Affected Body Part(s) Left LE;Right LE   Edema Etiology Infection   Edema Precautions Acute infection   General Comments/Previous Edema Treatment/Edema  "Equipment Pt owns \"edema wear\"   Edema Examination/Assessment   Skin Condition Pitting;Dryness;Temperature   Ulcerations Yes  (R LE)   Stemmer Sign Negative   Skin Integrity Discolored, macerated, hemosideran staining   Pitting Assessment 2+ edema in B feet.   Strength (Manual Muscle Testing)   Strength (Manual Muscle Testing) Deficits observed during functional mobility   Bed Mobility   Bed Mobility supine-sit   Supine-Sit [place]itt (Bed Mobility) minimum assist (75% patient effort);1 person assist   Bed Mobility Limitations decreased ability to use legs for bridging/pushing;impaired ability to control trunk for mobility   Assistive Device (Bed Mobility) bed rails   Transfers   Transfers sit-stand transfer   Sit-Stand Transfer   Sit-Stand [place] (Transfers) maximum assist (25% patient effort);1 person assist   Assistive Device (Sit-Stand Transfers) walker, front-wheeled   Gait/Stairs (Locomotion)   Comment, (Gait/Stairs) n/a- pt did not amb functional distance during eval.   Clinical Impression   Criteria for Skilled Therapeutic Intervention Yes, treatment indicated   PT Diagnosis (PT) Impaired functional mobility   Influenced by the following impairments Weakness, pain   Functional limitations due to impairments Impaired strength, transfers, gait   Clinical Presentation (PT Evaluation Complexity) evolving   Clinical Presentation Rationale Presents as diagnosed   Clinical Decision Making (Complexity) moderate complexity   Planned Therapy Interventions (PT) balance training;bed mobility training   Edema: Planned Interventions Gradient compression bandaging;Fit for compression garment;Precautions to prevent infection/exacerbation;Education   Risk & Benefits of therapy have been explained patient   PT Total Evaluation Time   PT Eval, Moderate Complexity Minutes (48738) 10   Therapy Certification   Start of care date 03/28/25   Certification date from 03/28/25   Certification date to 04/04/25   Medical " Diagnosis Cellulitis   Physical Therapy Goals   PT Frequency Daily   PT Predicted Duration/Target Date for Goal Attainment 04/04/25   PT Goals Edema   PT: Bed Mobility Modified independent;Supine to/from sit   PT: Transfers Supervision/stand-by assist;Sit to/from stand;Bed to/from chair;Assistive device   PT: Gait Minimal assist;Rolling walker;50 feet   PT: Edema education to increase ability to manage edema after discharge from the hospital Patient;Caregiver;Verbalize;Skin care routine;signs/symptoms of intolerance;limb positioning;wear schedule;garment/bandage care;discharge recommendations   PT: Management of edema bandages Patient;Caregiver;Verbalize;quick wrap;garment(s)   Interventions   Interventions Quick Adds Self-Care/Home Mgmt   Therapeutic Activity   Therapeutic Activities: dynamic activities to improve functional performance Minutes (42753) 10   Symptoms Noted During/After Treatment Fatigue   Treatment Detail/Skilled Intervention Initial sit > stand, unsuccessful with max Ax1. Increased bed height. Additional sit > stand with min Ax2 and neftali walker. Pt able to maintain standing balance ~ 2 mins. Pt amb 5' from bed > recliner with min A and FWW. Declined futher ambulation today due to pain. Pt seated in recliner at end of session, alarm engaged.   Self-Care/Home Management   Self-Care/Home Mgmt/ADL, Compensatory, Meal Prep Minutes (57309) 25   Symptoms Noted During/After Treatment increased pain   Treatment Detail/Skilled Intervention Aquaphor applied to L LE. Wound cares completed on R LE 3/27. Wrapped B LEs from base of toes to knees with short stretch compression banages. TG soft base layer with 50% overlap, light compression and herringbone pattern. Reviewed wearing schedule and indications for removal.   PT Discharge Planning   PT Plan progress amb with chair follow, LE ex; Lymph: wound cares, reassess LEs   PT Discharge Recommendation (DC Rec) Transitional Care Facility   PT Rationale for DC Rec  Recommend TCU to manage LE wounds/edema. Pt appears weaker than baseline today.   PT Brief overview of current status Bed > recliner, min A with FWW.   PT Total Distance Amb During Session (feet) 5   PT Equipment Needed at Discharge walker, rolling  (neftali)   Physical Therapy Time and Intention   Timed Code Treatment Minutes 35   Total Session Time (sum of timed and untimed services) 45       [facility]                                                                                   OUTPATIENT PHYSICAL THERAPY    PLAN OF TREATMENT FOR OUTPATIENT REHABILITATION   Patient's Last Name, First Name, HARRIETNenaYOLIENena PastranaJackie patelElizabeth  R YOB: 1945   Provider's Name   [facility]   Medical Record No.  [id number]     Onset Date: 03/26/25 Start of Care Date: (P) 03/28/25     Medical Diagnosis:  (P) Cellulitis               PT Diagnosis:  Impaired functional mobility Certification Dates:  From: (P) 03/28/25  To: (P) 04/04/25       See note for plan of treatment, functional goals, and certification details.    I CERTIFY THE NEED FOR THESE SERVICES FURNISHED UNDER        THIS PLAN OF TREATMENT AND WHILE UNDER MY CARE (Physician co-signature of this document indicates review and certification of the therapy plan).                Christina Yi, PT, DPT  3/28/2025

## 2025-03-28 NOTE — PLAN OF CARE
Goal Outcome Evaluation:                      A&Ox4. Assist of 2 with walker and gait belt. Lymph wraps placed by OT. Sat up in chair for half of shift. Denies pain. Plan is to discharge to TCU tomorrow.

## 2025-03-28 NOTE — UTILIZATION REVIEW
Concurrent stay review; Secondary Review Determination - [facility]        Under the authority of the Utilization Management Committee, the utilization review process indicated a secondary review on the above patient.  The review outcome is based on review of the medical records, discussions with staff, and applying clinical experience noted on the date of the review.        (x) Outpatient residential status is appropriate       RATIONALE FOR DETERMINATION:     79 year old female with PMHx of recurrent right lower extremity cellulitis, chronic lower extremity lymphedema, chronic venous stasis, chronic anemia, RLS, HLD, GERD.  Patient has multiple hospitalizations for recurrent lower extremity cellulitis.  Recently discharged home after patient declined TCU.  Now readmitted on 3/26/2025, unable to care for herself at home with diarrhea.  Workup for diarrhea is negative for infection.  Care manager is working on placement    Patient delayed discharge is related to disposition, there is no medical necessity for inpatient admission at the time of this review. If there is a change in patient status, please resend for review.    The information on this document is developed by the utilization review team in order for the business office to ensure compliance.  This only denotes the appropriateness of proper admission status and does not reflect the quality of care rendered.       The definitions of Inpatient Status and Observation Status used in making the determination above are those provided in the CMS Coverage Manual, Chapter 1 and Chapter 6, section 70.4.       Sincerely,    Gabe Fleming MD

## 2025-03-28 NOTE — PROGRESS NOTES
"PRIMARY DIAGNOSIS: \"GENERIC\" NURSING  OUTPATIENT/OBSERVATION GOALS TO BE MET BEFORE DISCHARGE:  ADLs back to baseline: No    Activity and level of assistance: Up with maximum assistance. Consider SW and/or PT evaluation.     Pain status: Improved-controlled with oral pain medications.    Return to near baseline physical activity: No     Discharge Planner Nurse   Safe discharge environment identified: No  Barriers to discharge: Yes       Entered by: Alex Martinez RN 03/28/2025 12:37 AM     Please review provider order for any additional goals.   Nurse to notify provider when observation goals have been met and patient is ready for discharge.  "

## 2025-03-28 NOTE — PROGRESS NOTES
"PRIMARY DIAGNOSIS: \"GENERIC\" NURSING  OUTPATIENT/OBSERVATION GOALS TO BE MET BEFORE DISCHARGE:  ADLs back to baseline: No    Activity and level of assistance: Up with maximum assistance. Consider SW and/or PT evaluation.     Pain status: Improved-controlled with oral pain medications.    Return to near baseline physical activity: No     Discharge Planner Nurse   Safe discharge environment identified: No  Barriers to discharge: Yes       Entered by: Angelica Solomon RN 03/28/2025 12:45 PM     Please review provider order for any additional goals.   Nurse to notify provider when observation goals have been met and patient is ready for discharge.   "

## 2025-03-28 NOTE — PLAN OF CARE
"  Problem: Adult Inpatient Plan of Care  Goal: Optimal Comfort and Wellbeing  Outcome: Progressing     Problem: Pain Acute  Goal: Optimal Pain Control and Function  Outcome: Not Progressing  Intervention: Prevent or Manage Pain  Recent Flowsheet Documentation  Taken 3/28/2025 0101 by Alex Martinez RN  Medication Review/Management: medications reviewed  Taken 3/27/2025 1955 by Alex Martinez RN  Medication Review/Management: medications reviewed   Goal Outcome Evaluation:  PRIMARY DIAGNOSIS: \"GENERIC\" NURSING  OUTPATIENT/OBSERVATION GOALS TO BE MET BEFORE DISCHARGE:  ADLs back to baseline: No    Activity and level of assistance: Up with maximum assistance. Consider SW and/or PT evaluation.     Pain status: Improved-controlled with oral pain medications.    Return to near baseline physical activity: No     Discharge Planner Nurse   Safe discharge environment identified: No  Barriers to discharge: Yes       Entered by: Alex Martinez RN 03/28/2025 5:51 AM     Please review provider order for any additional goals.   Nurse to notify provider when observation goals have been met and patient is ready for discharge.       A&Ox4. Cooperative with cares. PRN PO dilaudid administered for pain in right leg 2x and tylenol. Purewick in place. Dressing C/D/I. Able to make needs known.                "

## 2025-03-29 ENCOUNTER — APPOINTMENT (OUTPATIENT)
Dept: PHYSICAL THERAPY | Facility: HOSPITAL | Age: 80
End: 2025-03-29
Payer: COMMERCIAL

## 2025-03-29 ENCOUNTER — DOCUMENTATION ONLY (OUTPATIENT)
Dept: GERIATRICS | Facility: CLINIC | Age: 80
End: 2025-03-29
Payer: COMMERCIAL

## 2025-03-29 VITALS
SYSTOLIC BLOOD PRESSURE: 157 MMHG | WEIGHT: 250 LBS | OXYGEN SATURATION: 96 % | DIASTOLIC BLOOD PRESSURE: 66 MMHG | BODY MASS INDEX: 48.82 KG/M2 | TEMPERATURE: 97.9 F | HEART RATE: 78 BPM | RESPIRATION RATE: 20 BRPM

## 2025-03-29 VITALS
HEART RATE: 73 BPM | BODY MASS INDEX: 49.94 KG/M2 | RESPIRATION RATE: 18 BRPM | TEMPERATURE: 98.1 F | HEIGHT: 60 IN | DIASTOLIC BLOOD PRESSURE: 55 MMHG | SYSTOLIC BLOOD PRESSURE: 117 MMHG | WEIGHT: 254.4 LBS | OXYGEN SATURATION: 91 %

## 2025-03-29 LAB
GLUCOSE BLDC GLUCOMTR-MCNC: 89 MG/DL (ref 70–99)
HGB BLD-MCNC: 8.3 G/DL (ref 11.7–15.7)
HOLD SPECIMEN: NORMAL

## 2025-03-29 PROCEDURE — 99239 HOSP IP/OBS DSCHRG MGMT >30: CPT | Performed by: INTERNAL MEDICINE

## 2025-03-29 PROCEDURE — 250N000013 HC RX MED GY IP 250 OP 250 PS 637: Performed by: INTERNAL MEDICINE

## 2025-03-29 PROCEDURE — 82962 GLUCOSE BLOOD TEST: CPT

## 2025-03-29 PROCEDURE — G0378 HOSPITAL OBSERVATION PER HR: HCPCS

## 2025-03-29 PROCEDURE — 250N000013 HC RX MED GY IP 250 OP 250 PS 637

## 2025-03-29 PROCEDURE — 36415 COLL VENOUS BLD VENIPUNCTURE: CPT | Performed by: INTERNAL MEDICINE

## 2025-03-29 PROCEDURE — 85018 HEMOGLOBIN: CPT | Performed by: INTERNAL MEDICINE

## 2025-03-29 PROCEDURE — 97535 SELF CARE MNGMENT TRAINING: CPT | Mod: GP

## 2025-03-29 RX ORDER — HYDROMORPHONE HYDROCHLORIDE 2 MG/1
1-2 TABLET ORAL EVERY 6 HOURS PRN
Qty: 15 TABLET | Refills: 0 | Status: SHIPPED | OUTPATIENT
Start: 2025-03-29

## 2025-03-29 RX ORDER — LACTOBACILLUS RHAMNOSUS GG 10B CELL
1 CAPSULE ORAL 2 TIMES DAILY
DISCHARGE
Start: 2025-03-29

## 2025-03-29 RX ORDER — MINOCYCLINE HYDROCHLORIDE 50 MG/1
50 TABLET ORAL DAILY
DISCHARGE
Start: 2025-04-02

## 2025-03-29 RX ORDER — CEPHALEXIN 500 MG/1
1000 CAPSULE ORAL EVERY 8 HOURS
DISCHARGE
Start: 2025-03-29 | End: 2025-04-01

## 2025-03-29 RX ORDER — MINOCYCLINE HYDROCHLORIDE 100 MG/1
100 CAPSULE ORAL EVERY 12 HOURS
DISCHARGE
Start: 2025-03-29 | End: 2025-04-01

## 2025-03-29 RX ADMIN — ACETAMINOPHEN 650 MG: 325 TABLET ORAL at 08:05

## 2025-03-29 RX ADMIN — Medication 250 MG: at 08:09

## 2025-03-29 RX ADMIN — WHITE PETROLATUM: 1.75 OINTMENT TOPICAL at 09:21

## 2025-03-29 RX ADMIN — BUPROPION HYDROCHLORIDE 150 MG: 150 TABLET, FILM COATED, EXTENDED RELEASE ORAL at 08:05

## 2025-03-29 RX ADMIN — MINOCYCLINE HYDROCHLORIDE 100 MG: 100 CAPSULE ORAL at 10:07

## 2025-03-29 RX ADMIN — ROPINIROLE HYDROCHLORIDE 0.5 MG: 0.25 TABLET, FILM COATED ORAL at 08:07

## 2025-03-29 RX ADMIN — CEPHALEXIN 1000 MG: 500 CAPSULE ORAL at 06:34

## 2025-03-29 RX ADMIN — FERROUS SULFATE TAB 325 MG (65 MG ELEMENTAL FE) 325 MG: 325 (65 FE) TAB at 08:06

## 2025-03-29 RX ADMIN — SPIRONOLACTONE 25 MG: 25 TABLET, FILM COATED ORAL at 08:09

## 2025-03-29 RX ADMIN — GABAPENTIN 100 MG: 100 CAPSULE ORAL at 08:06

## 2025-03-29 RX ADMIN — Medication 1 CAPSULE: at 08:07

## 2025-03-29 RX ADMIN — PANTOPRAZOLE SODIUM 40 MG: 40 TABLET, DELAYED RELEASE ORAL at 06:34

## 2025-03-29 RX ADMIN — POTASSIUM CHLORIDE 20 MEQ: 1500 TABLET, EXTENDED RELEASE ORAL at 08:07

## 2025-03-29 RX ADMIN — THERA TABS 1 TABLET: TAB at 08:07

## 2025-03-29 RX ADMIN — FUROSEMIDE 60 MG: 20 TABLET ORAL at 08:03

## 2025-03-29 RX ADMIN — HYDROMORPHONE HYDROCHLORIDE 2 MG: 2 TABLET ORAL at 08:04

## 2025-03-29 ASSESSMENT — ACTIVITIES OF DAILY LIVING (ADL)
ADLS_ACUITY_SCORE: 50
ADLS_ACUITY_SCORE: 53
ADLS_ACUITY_SCORE: 50
ADLS_ACUITY_SCORE: 50
ADLS_ACUITY_SCORE: 51
ADLS_ACUITY_SCORE: 51
ADLS_ACUITY_SCORE: 50

## 2025-03-29 NOTE — PLAN OF CARE
Lymphedema Discharge Summary    Reason for therapy discharge:    Discharged to transitional care facility.    Progress towards therapy goal(s). See goals on Care Plan in [facility] electronic health record for goal details.  Goals not met.  Barriers to achieving goals:   discharge from facility.    Therapy recommendation(s):    Continued therapy is recommended.  Rationale/Recommendations:  Continued Lymphedema therapy.      Physical Therapy Discharge Summary    Reason for therapy discharge:    Discharged to transitional care facility.    Progress towards therapy goal(s). See goals on Care Plan in [facility] electronic health record for goal details.  Goals not met.  Barriers to achieving goals:   discharge from facility.    Therapy recommendation(s):    Recommend continued therapies to improve overall strength, balance and mobility.       Christina Yi, PT, DPT  3/29/2025

## 2025-03-29 NOTE — PROGRESS NOTES
Occupational Therapy Discharge Summary    Reason for therapy discharge:    Discharged to transitional care facility.    Progress towards therapy goal(s). See goals on Care Plan in [facility] electronic health record for goal details.  Goals not met.  Barriers to achieving goals:   discharge from facility.    Therapy recommendation(s):    Continued therapy is recommended.  Rationale/Recommendations:  @ TCU to progress self care skills and to optimize safe return to daily activity.    Magnus Manley, ANGUSR

## 2025-03-29 NOTE — PLAN OF CARE
Problem: Adult Inpatient Plan of Care  Goal: Readiness for Transition of Care  Outcome: Progressing     Problem: Pain Acute  Goal: Optimal Pain Control and Function  Outcome: Progressing   Goal Outcome Evaluation:       No significant events this shift. Interdry placed in skin folds above thighs due to skin breakdown from moisture, MD aware. Reports pain 7/10 that improves with PO Dilaudid and Tylenol. Pt is A&Ox4, very pleasant. Call light within reach. Up to chair for dinner, back to bed with 2 assist.

## 2025-03-29 NOTE — PLAN OF CARE
PRIMARY DIAGNOSIS: SOFT TISSUE INFECTIONS  OUTPATIENT/OBSERVATION GOALS TO BE MET BEFORE DISCHARGE:  Vitals sign stable or return to baseline: Yes    Tolerating oral antibiotics or has home infusion set up if applicable: Yes    Pain status: Improved-controlled with oral pain medications.    Return to near baseline physical activity: Yes    Discharge Planner Nurse   Safe discharge environment identified: Yes  Barriers to discharge: No       Entered by: Kaylin Tian RN 03/29/2025 11:19 AM     Please review provider order for any additional goals.     Nurse to notify provider when observation goals have been met and patient is ready for discharge.Goal Outcome Evaluation:       Pt to discharge to TCU today.  Wound care completed.    Kaylin Tian RN

## 2025-03-29 NOTE — PROGRESS NOTES
[facility]    Medicine Progress Note - Hospitalist Service    Date of Admission:  3/26/2025    Assessment & Plan      Elizabeth Kelley is a 79 year old female with PMHx of recurrent right lower extremity cellulitis, chronic lower extremity lymphedema, chronic venous stasis, chronic anemia, RLS, HLD, GERD who was admitted on 3/12/2025. She has a history of recurrent hospitalizations for right lower extremity cellulitis. Patient recently admitted 1/12 - 1/18/2025 for RIGHT lower extremity cellulitis and sepsis, treated with meropenem and vancomycin, ID was consulted and she was discharged on doxycycline.  She was again hospitalized 3/12 - 3/25 for LEFT lower extremity cellulitis. Treated with Vanco and Zosyn/ceftriaxone and discharged with PO Keflex. Recommendations were to discharge to TCU but patient declined. She returned to the ER on 3/26/25 for inability to care for self at home and new onset diarrhea.      Generalized Weakness   Physical Deconditioning   -- PT/OT/CM - again recommending TCU; CM coordinating placement      Diarrhea   After returning home, had one episode of emesis and nausea followed by several episodes of loose stools. No abdominal pain   C diff negative. Started probiotics. She has no further diarrhea since admission.        Recent Left Lower Extremity Cellulitis  Had been empirically treated with Vanco and Zosyn/ceftriaxone and discharged with Keflex. Patient states edema continues to improve .   - Continue Keflex   -- Continue minocycline after completion of Keflex indefinitely for chronic ppx      History of Recurrent Right Lower Extremity Cellulitis  Chronic Lower Extremity Lymphedema  Chronic Venous Stasis  Chronic Right Lower Extremity Wounds  Patient with history of frequent hospitalizations for sepsis secondary to right lower extremity cellulitis  -- PT for lymphedema wraps   -- WOC consulted   -- Pain control with Tylenol, PRN Dilaudid   -- Continue PTA Lasix  and spironolactone for edema   -- Trend CRP as above and Hgb as below - patient notes has had slight increased bleeding to dressings since discharge      Chronic Anemia  Hiatal Hernia with Brian Lesions   Last hospitalization was complicated by acute on chronic anemia with Hgb drop to 6.2 ; requiring transfusion. GI was consulted and EGD done on 3/21 showed sizable hiatal hernia with Brian lesions with oozing   -- Hgb 8.8 --> 7.8 (was 8.3 at last discharge)   -- Trend CBC  -- GI had previously recommended medical management with PPI and Fe supplementation. May need definitive surgical repair.   -- Low threshold for GI consult if Hgb continues to drop     Thrombocytosis   Noted as of last admission - previously within normal limits - ? Reactive   -- trend CBC      Restless leg syndrome - continue PTA ropinirole  Anxiety depression - continue PTA Wellbutrin and Celexa  Hyperlipidemia - continue PTA statin   GERD - continue PTA PPI     Observation Goals: -diagnostic tests and consults completed and resulted, -vital signs normal or at patient baseline, Nurse to notify provider when observation goals have been met and patient is ready for discharge.  Diet: Regular Diet Adult    DVT Prophylaxis: Pneumatic Compression Devices  Amato Catheter: Not present  Lines: None     Cardiac Monitoring: None  Code Status: Full Code      Clinically Significant Risk Factors Present on Admission                   # Hypertension: Noted on problem list      # Anemia: based on hgb <11       # Severe Obesity: Estimated body mass index is 48.82 kg/m  as calculated from the following:    Height as of 3/12/25: 1.524 m (5').    Weight as of this encounter: 113.4 kg (250 lb).       # Financial/Environmental Concerns:           Social Drivers of Health    Tobacco Use: Medium Risk (3/26/2025)    Patient History     Smoking Tobacco Use: Former     Smokeless Tobacco Use: Never          Disposition Plan     Medically Ready for Discharge:  Anticipated Tomorrow             Vincent Weinberg MD  Hospitalist Service  [facility]  Securely message with [email or web address] (more info)  Text page via [employer] Paging/Directory   ______________________________________________________________________    Interval History   She complains about random jerky movement of her arms and legs, which last for 1-2 seconds and resolved without intervention.  She also has left leg pain on wound care.     Physical Exam   Vital Signs: Temp: 98.1  F (36.7  C) Temp src: Oral BP: 123/58 Pulse: 68   Resp: 18 SpO2: 96 % O2 Device: None (Room air)    Weight: 250 lbs 0 oz    General appearance: not in acute distress  HEENT: PERRL, EOMI  Lungs: Clear breath sounds in bilateral lung fields  Cardiovascular: Regular rate and rhythm, normal S1-S2  Abdomen: Soft, non tender, no distension  Musculoskeletal: No joint swelling  Skin: Both legs on lymphedema wraps.  Neurology: AAO ×3.  Cranial nerves II - XII normal.  Normal muscle strength in all four extremities.     Medical Decision Making       48 MINUTES SPENT BY ME on the date of service doing chart review, history, exam, documentation & further activities per the note.      Data     I have personally reviewed the following data over the past 24 hrs:    6.5  \   7.7 (L)   / 587 (H)     138 98 13.3 /  95   4.5 33 (H) 1.07 (H) \     Procal: N/A CRP: 164.70 (H) Lactic Acid: N/A         Imaging results reviewed over the past 24 hrs:   No results found for this or any previous visit (from the past 24 hours).

## 2025-03-29 NOTE — PLAN OF CARE
"PRIMARY DIAGNOSIS: \"GENERIC\" NURSING  OUTPATIENT/OBSERVATION GOALS TO BE MET BEFORE DISCHARGE:  ADLs back to baseline: No    Activity and level of assistance: Up with maximum assistance. Consider SW and/or PT evaluation.     Pain status: Improved-controlled with oral pain medications.    Return to near baseline physical activity: No     Discharge Planner Nurse   Safe discharge environment identified: No  Barriers to discharge: Yes       Entered by: Sirena Mora RN 03/29/2025 4:43 AM     Please review provider order for any additional goals.   Nurse to notify provider when observation goals have been met and patient is ready for discharge.Goal Outcome Evaluation:                        "

## 2025-03-29 NOTE — PLAN OF CARE
"Goal Outcome Evaluation:                      PRIMARY DIAGNOSIS: \"GENERIC\" NURSING  OUTPATIENT/OBSERVATION GOALS TO BE MET BEFORE DISCHARGE:  ADLs back to baseline: No    Activity and level of assistance: Up with maximum assistance. Consider SW and/or PT evaluation.     Pain status: Improved-controlled with oral pain medications.    Return to near baseline physical activity: No     Discharge Planner Nurse   Safe discharge environment identified: No  Barriers to discharge: Yes       Entered by: Sirena Mora RN 03/29/2025 2:14 AM     Please review provider order for any additional goals.   Nurse to notify provider when observation goals have been met and patient is ready for discharge.  "

## 2025-03-29 NOTE — DISCHARGE SUMMARY
[facility]  Hospitalist Discharge Summary      Date of Admission:  3/26/2025  Date of Discharge:  3/29/2025   Discharging Provider: Vincent Weinberg MD  Discharge Service: Hospitalist Service    Discharge Diagnoses     Principal Problem:    Weakness generalized  Active Problems:    Acquired lymphedema of leg    Essential hypertension    Mixed hyperlipidemia    Morbid obesity -- BMI 48.8    Restless legs    Cellulitis of left lower extremity    Diarrhea    Chronic blood loss anemia    Hiatal hernia    Hemorrhage due to chronic Brian lesion       Clinically Significant Risk Factors     # Severe Obesity: Estimated body mass index is 48.82 kg/m  as calculated from the following:    Height as of 3/12/25: 1.524 m (5').    Weight as of this encounter: 113.4 kg (250 lb).       Follow-ups Needed After Discharge   Follow-up Appointments       Follow Up and recommended labs and tests      Follow up with Nursing home physician in one week.  The following labs/tests are recommended: Check hemoglobin.                Discharge Disposition   Discharged to short-term care facility  Condition at discharge: Stable    Hospital Course     Elizabeth Kelley is a 79 year old female who has PMHx of recurrent right lower extremity cellulitis, chronic lower extremity lymphedema, chronic venous stasis, chronic anemia, RLS, and HLD. She has a history of recurrent hospitalizations for lower extremity cellulitis. Patient recently was admitted 1/12 - 1/18/2025 for RIGHT lower extremity cellulitis and sepsis, treated with meropenem and vancomycin, and she was discharged on doxycycline.  She was again hospitalized 3/12 - 3/25/25 for LEFT lower extremity cellulitis. She was treated with Vanco and Zosyn/ceftriaxone and discharged with Keflex. She was recommended to be discharged to TCU, but she declined. She returned to the ER on 3/26/25 for inability to care for self at home and new onset diarrhea.      Generalized Weakness,  Physical Deconditioning   Patient is now agreeable to go to TCU.     Diarrhea   She reports that after returning home, she had one episode of emesis and nausea followed by several episodes of loose stools. No abdominal pain. C diff was checked and it was negative. Probiotics was started. She has no further diarrhea since admission.        Recent Left Lower Extremity Cellulitis  After her last admission, she was discharged on Keflex and minocycline 100 mg bid for 7 days. Her lower extremity cellulitis continues to improve. She will complete treatment with Keflex and minocycline as planned, then transition to chronic suppression with minocycline 50 mg daily.      Chronic Lower Extremity Lymphedema  Chronic Venous Stasis  Chronic Right Lower Extremity Wounds  Continue lymphedema wraps and wound care      Chronic Anemia  Hiatal Hernia with Brian Lesions   Her last hospitalization was complicated by acute on chronic anemia with Hgb drop to 6.2, requiring transfusion. GI was consulted and EGD on 3/21/25 showed sizable hiatal hernia with Brian lesions with oozing. GI recommends medical management with PPI and iron supplementation.   Her hemoglobin has been stable at the level of 7.8-8.8. Recommend to continue to monitor.     Thrombocytosis   Noted as of last admission - previously within normal limits. Likely reactive.      Restless leg syndrome - continue PTA ropinirole  Anxiety depression - continue PTA Wellbutrin and Celexa  Hyperlipidemia - continue PTA statin       Consultations This Hospital Stay   OCCUPATIONAL THERAPY ADULT IP CONSULT  PHYSICAL THERAPY ADULT IP CONSULT  CARE MANAGEMENT / SOCIAL WORK IP CONSULT  WOUND OSTOMY CONTINENCE NURSE  IP CONSULT  LYMPHEDEMA THERAPY IP CONSULT  CARE MANAGEMENT / SOCIAL WORK IP CONSULT  WOUND OSTOMY CONTINENCE NURSE  IP CONSULT  PHYSICAL THERAPY ADULT IP CONSULT  OCCUPATIONAL THERAPY ADULT IP CONSULT  LYMPHEDEMA THERAPY IP CONSULT    Code Status   Prior    Time Spent on  this Encounter   I, Vincent Weinberg MD, personally saw the patient today and spent greater than 30 minutes discharging this patient.       Vincent Weinberg MD  [address] [address]  Phone: [phone]  Fax: [phone]  ______________________________________________________________________    Physical Exam   Vital Signs:                    Weight: 250 lbs 0 oz    General appearance: not in acute distress  HEENT: PERRL, EOMI  Lungs: Clear breath sounds in bilateral lung fields  Cardiovascular: Regular rate and rhythm, normal S1-S2  Abdomen: Soft, non tender, no distension  Musculoskeletal: No joint swelling  Skin: Both legs on lymphedema wraps.  Neurology: AAO ×3.  Cranial nerves II - XII normal.  Normal muscle strength in all four extremities.        Primary Care Physician   Yung Herrmann    Discharge Orders      Primary Care - Care Coordination Referral      Med Therapy Management Referral      General info for SNF    Length of Stay Estimate: Short Term Care: Estimated # of Days <30  Condition at Discharge: Improving  Level of care:skilled   Rehabilitation Potential: Good  Admission H&P remains valid and up-to-date: Yes  Recent Chemotherapy: N/A  Use Nursing Home Standing Orders: Yes     Mantoux instructions    Give two-step Mantoux (PPD) Per Facility Policy Yes     Follow Up and recommended labs and tests    Follow up with Nursing home physician in one week.  The following labs/tests are recommended: Check hemoglobin.     Reason for your hospital stay    Generalized weakness     Activity - Up with nursing assistance     Physical Therapy Adult Consult    Evaluate and treat as clinically indicated.    Reason:  Generalized weakness     Occupational Therapy Adult Consult    Evaluate and treat as clinically indicated.    Reason:  generalized weakness     Lymphedema Therapy Adult Consult    Evaluate and treat as clinically indicated.    Reason:  Bilateral  lower extremity lymphedema     Fall precautions     Diet    Follow this diet upon discharge:    Regular Diet Adult       Significant Results and Procedures   Most Recent 3 CBC's:  Recent Labs   Lab Test 03/29/25  0751 03/28/25  0733 03/27/25  0827 03/26/25 2054   WBC  --  6.5 9.0 10.4   HGB 8.3* 7.7* 7.8* 8.8*   MCV  --  84 83 83   PLT  --  587* 617* 698*     Most Recent 3 BMP's:  Recent Labs   Lab Test 03/29/25  0749 03/28/25  0733 03/26/25 2054 03/23/25  0539 03/22/25  0514   NA  --  138 140  --  137   POTASSIUM  --  4.5 4.2  --  3.5   CHLORIDE  --  98 98  --  99   CO2  --  33* 34*  --  31*   BUN  --  13.3 16.1  --  17.7   CR  --  1.07* 0.96* 0.85 0.86   ANIONGAP  --  7 8  --  7   JUNI  --  8.5* 9.0  --  8.6*   GLC 89 95 91  --  98   ,   Results for orders placed or performed during the hospital encounter of 03/12/25   US Lower Extremity Venous Duplex Bilateral    Narrative    EXAM: US LOWER EXTREMITY VENOUS DUPLEX BILATERAL  LOCATION: [facility]  DATE: 3/12/2025    INDICATION: redness, swelling  COMPARISON: None.  TECHNIQUE: Venous Duplex ultrasound of bilateral lower extremities with and without compression, augmentation and duplex. Color flow and spectral Doppler with waveform analysis performed.    FINDINGS: Exam includes the common femoral, femoral, popliteal veins as well as segmentally visualized deep calf veins and greater saphenous vein.     RIGHT: No deep vein thrombosis. No superficial thrombophlebitis. No popliteal cyst.    LEFT: No deep vein thrombosis. No superficial thrombophlebitis. No popliteal cyst.      Impression    IMPRESSION:  1.  No deep venous thrombosis in the bilateral lower extremities.    2.  Suboptimal exam due to patient body habitus and edema. Patient declined unwrapping of the calves due to open wounds. Calf vessels only partially visualized but negative for thrombus were seen.   CT Femur Thigh Left with Contrast    Narrative    EXAM: CT FEMUR THIGH LEFT  WITH CONTRAST, CT PELVIS SOFT TISSUE W CONTRAST  LOCATION: [facility]  DATE: 3/13/2025    INDICATION: left leg cellulitis  COMPARISON: None  TECHNIQUE: IV contrast. Axial, sagittal and coronal thin-section reconstruction. Dose reduction techniques were used.   CONTRAST: isovue 370 75ml    FINDINGS:     BONES AND JOINTS:  -No acute fracture or malalignment. No evidence of erosions or periostitis to suggest acute osteomyelitis. Degenerative changes throughout the pelvis and lower lumbar spine. Osteopenia. No significant joint effusion.    SOFT TISSUES:  -Moderate soft tissue edema and skin thickening throughout the left thigh. No discrete fluid collection. Mild to moderate subcutaneous soft tissue edema at the pelvis laterally. Mildly enlarged inguinal lymph nodes bilaterally. Prominent left iliac   nodes. Focal fat stranding anterior to the left adductor longus muscle. Gluteal muscle atrophy bilaterally. Multiple gas locules in the soft tissue of the anterior left abdominal wall, favored to be injection related. No acute intrapelvic abnormality.   Colonic diverticulosis. Calcified uterine fibroid. Small right renal calculi.      Impression    IMPRESSION:  1.  Moderate soft tissue edema involving the pelvis and left thigh, compatible with cellulitis in the appropriate clinical setting. No abscess.  2.  Focal fat stranding anterior to the left adductor longus muscle, cannot exclude a component of fasciitis or myositis.   3.  Mildly enlarged inguinal and left iliac lymph nodes are nonspecific and may be reactive.  4.  No evidence of acute osteomyelitis.  5.  Other ancillary findings as described above.     CT Pelvis Soft Tissue w Contrast    Narrative    EXAM: CT FEMUR THIGH LEFT WITH CONTRAST, CT PELVIS SOFT TISSUE W CONTRAST  LOCATION: [facility]  DATE: 3/13/2025    INDICATION: left leg cellulitis  COMPARISON: None  TECHNIQUE: IV contrast. Axial, sagittal and coronal  thin-section reconstruction. Dose reduction techniques were used.   CONTRAST: isovue 370 75ml    FINDINGS:     BONES AND JOINTS:  -No acute fracture or malalignment. No evidence of erosions or periostitis to suggest acute osteomyelitis. Degenerative changes throughout the pelvis and lower lumbar spine. Osteopenia. No significant joint effusion.    SOFT TISSUES:  -Moderate soft tissue edema and skin thickening throughout the left thigh. No discrete fluid collection. Mild to moderate subcutaneous soft tissue edema at the pelvis laterally. Mildly enlarged inguinal lymph nodes bilaterally. Prominent left iliac   nodes. Focal fat stranding anterior to the left adductor longus muscle. Gluteal muscle atrophy bilaterally. Multiple gas locules in the soft tissue of the anterior left abdominal wall, favored to be injection related. No acute intrapelvic abnormality.   Colonic diverticulosis. Calcified uterine fibroid. Small right renal calculi.      Impression    IMPRESSION:  1.  Moderate soft tissue edema involving the pelvis and left thigh, compatible with cellulitis in the appropriate clinical setting. No abscess.  2.  Focal fat stranding anterior to the left adductor longus muscle, cannot exclude a component of fasciitis or myositis.   3.  Mildly enlarged inguinal and left iliac lymph nodes are nonspecific and may be reactive.  4.  No evidence of acute osteomyelitis.  5.  Other ancillary findings as described above.     US Lower Extremity Arterial Duplex Bilateral    Narrative    EXAM: US LOWER EXTREMITY ARTERIAL DUPLEX BILATERAL  LOCATION: [facility]  DATE: 3/18/2025    INDICATION: Bilateral lower extremity wounds, eval for PAD, decreased lower extremity pulses.  COMPARISON: ANSHUL exam performed earlier the same date.  TECHNIQUE: Duplex utilizing 2D grayscale imaging. Doppler interrogation with color-flow and spectral waveform analysis.    FINDINGS:    RIGHT LOWER EXTREMITY ARTERIAL  ASSESSMENT:  External iliac artery 310 cm/s  Common femoral artery: 208 cm/s  Profunda femoris artery: 165 cm/s  SFA (proximal): 204 cm/s  SFA (mid): 170 cm/s  SFA (distal): 193 cm/s  Popliteal artery (proximal): 203 cm/s  Popliteal artery (distal): 116 cm/s  Posterior tibial artery: 84 cm/s  Anterior tibial artery: 66 cm/s  Dorsalis pedis artery: 83 cm/s    LEFT LOWER EXTREMITY ARTERIAL ASSESSMENT:  External iliac artery 284 cm/s  Common femoral artery: 235 cm/s  Profunda femoris artery: 178 cm/s  SFA (proximal): 202 cm/s  SFA (mid): 187 cm/s  SFA (distal): 193 cm/s  Popliteal artery (proximal): 188 cm/s  Popliteal artery (distal): 108 cm/s  Posterior tibial artery: 72 cm/s  Anterior tibial artery: 71 cm/s  Dorsalis pedis artery: 133 cm/s      Impression    IMPRESSION:  1.  RIGHT LOWER EXTREMITY: Patent vasculature with multiphasic waveforms. No significant stenosis identified.  2.  LEFT LOWER EXTREMITY: Monophasic waveforms in the external iliac artery, suggesting iliac inflow disease. The remaining vasculature is patent without significant stenosis.   US ANSHUL with PPG wo Exercise    Narrative    EXAM: RESTING ANKLE-BRACHIAL INDICES (ABIs)  LOCATION: [facility]  DATE: 3/18/2025    INDICATION: Bilateral lower extremity wounds, evaluate for PAD  COMPARISON: None.    ANSHUL FINDINGS:  RIGHT  Brachial: 140  Ankle (PT): 136 Index: 0.97  Ankle (DP): 128 Index: 0.91  Digit: 101 Index: 0.72    LEFT  Brachial: 128  Ankle (PT): 137 Index: 0.98  Ankle (DP): 124 Index: 0.89  Digit: 128 Index: 0.84    The right ANSHUL at rest is 0.97. The left ANSHUL at rest is 0.98.      WAVEFORMS: The dorsalis pedis and posterior tibial arteries are multiphasic. Bilateral digital artery waveforms are normal appearing..      Impression    IMPRESSION:  1.  RIGHT LOWER EXTREMITY: ANSHUL at rest is normal.  2.  LEFT LOWER EXTREMITY: ANSHUL at rest is normal.       Discharge Medications   Discharge Medication List as of 3/29/2025 10:04 AM         START taking these medications    Details   HYDROmorphone (DILAUDID) 2 MG tablet Take 0.5-1 tablets (1-2 mg) by mouth every 6 hours as needed for moderate to severe pain., Disp-15 tablet, R-0, Local Print      lactobacillus rhamnosus, GG, (CULTURELL) capsule Take 1 capsule by mouth 2 times daily., Transitional           CONTINUE these medications which have CHANGED    Details   cephALEXin (KEFLEX) 500 MG capsule Take 2 capsules (1,000 mg) by mouth every 8 hours for 3 days., Transitional      minocycline (DYNACIN) 50 MG tablet Take 1 tablet (50 mg) by mouth daily., Transitional      minocycline (MINOCIN) 100 MG capsule Take 1 capsule (100 mg) by mouth every 12 hours for 3 days., Transitional           CONTINUE these medications which have NOT CHANGED    Details   acetaminophen (TYLENOL) 500 MG tablet Take 1,000 mg by mouth daily as needed for pain., Historical      ascorbic acid (VITAMIN C) 250 MG CHEW chewable tablet Take 250 mg by mouth daily., Historical      buPROPion (WELLBUTRIN SR) 150 MG 12 hr tablet Take 150 mg by mouth every morning , Historical      Carboxymethylcellulose Sodium 0.25 % SOLN Apply 1 drop to eye daily as needed, Historical      citalopram (CELEXA) 40 MG tablet Take 40 mg by mouth daily (with lunch), Historical      ferrous sulfate (FEROSUL) 325 (65 Fe) MG tablet Take 325 mg by mouth every other day., Historical      furosemide (LASIX) 20 MG tablet Take 3 tablets (60 mg) by mouth daily., Disp-30 tablet, R-1, E-Prescribe      gabapentin (NEURONTIN) 100 MG capsule Take 100 mg by mouth 3 times daily, Historical      magnesium oxide (MAG-OX) 400 MG tablet Take 1 tablet by mouth every morning, Historical      multivitamin w/minerals (CENTRUM ADULTS) tablet Take 1 tablet by mouth every morning, Historical      nitroGLYcerin (NITROSTAT) 0.4 MG sublingual tablet PLACE 1 TABLET UNDER TONGUE EVERY 5 MINTUES AS NEEDED FOR CHEST PAIN FOR UP TO 30 DAYS, Historical      nystatin (MYCOSTATIN) 132037  UNIT/GM external powder Apply topically 2 times daily as needed.Historical      pantoprazole (PROTONIX) 40 MG EC tablet Take 1 tablet (40 mg) by mouth 2 times daily., Disp-60 tablet, R-1, E-Prescribe      potassium chloride ER (K-TAB) 20 MEQ CR tablet Take 20 mEq by mouth 2 times daily., Historical      rOPINIRole (REQUIP) 1 MG tablet Take 0.5 mg by mouth 2 times daily. Take one-half tablet (dose = 0.5 mg) twice daily. Once in the morning and once about dinnertime.     In addition, take one tablet (1 mg) at bedtime., Historical      simvastatin (ZOCOR) 40 MG tablet [SIMVASTATIN (ZOCOR) 40 MG TABLET] Take 40 mg by mouth bedtime., Historical      spironolactone (ALDACTONE) 25 MG tablet Take 25 mg by mouth every morning, Historical      vitamin B-Complex Take 1 tablet by mouth daily., Historical      vitamin D3 (CHOLECALCIFEROL) 250 mcg (13988 units) capsule Take 1 capsule by mouth daily., Historical      Wound Cleansers (VASHE WOUND THERAPY EX) Externally apply topically daily as needed, Historical      zinc 50 MG TABS Take 1 tablet by mouth three times a week. On Tues, Thur, Sat, Historical           Allergies   Allergies   Allergen Reactions    Other Environmental Allergy Anaphylaxis     Bees/Wasps Stings  Bees/Wasps Stings    Adhesive Tape Other (See Comments)     Red,itchy and oozes  Red,itchy and oozes    Adhesive [Cyanoacrylate] Unknown     Other reaction(s): sores    Latex Hives    Ragweeds Itching    Oxycodone-Acetaminophen Rash    Vicodin [Hydrocodone-Acetaminophen] Nausea and Vomiting and Hives

## 2025-03-29 NOTE — PROGRESS NOTES
Care Management Discharge Note    Discharge Date: 03/29/2025       Discharge Disposition:  ([facility])    Discharge Services:      Discharge DME:      Discharge Transportation: family or friend will provide    Private pay costs discussed: transportation costs    Does the patient's insurance plan have a 3 day qualifying hospital stay waiver?  Yes     Which insurance plan 3 day waiver is available? Alternative insurance waiver    Will the waiver be used for post-acute placement? Yes    PAS Confirmation Code: [id number]  Patient/family educated on Medicare website which has current facility and service quality ratings:      Education Provided on the Discharge Plan:    Persons Notified of Discharge Plans: patient   Patient/Family in Agreement with the Plan:      Handoff Referral Completed: No, handoff not indicated or clinically appropriate    Additional Information:  Confirmed with Naomi at [facility] that they can accept patient today as planned. Orders faxed     Updated nurse and HUC     Morelia Márquez RN

## 2025-03-30 ENCOUNTER — LAB REQUISITION (OUTPATIENT)
Dept: LAB | Facility: CLINIC | Age: 80
End: 2025-03-30
Payer: COMMERCIAL

## 2025-03-30 DIAGNOSIS — D64.9 ANEMIA, UNSPECIFIED: ICD-10-CM

## 2025-03-30 PROBLEM — K44.9 HIATAL HERNIA: Status: ACTIVE | Noted: 2025-03-30

## 2025-03-30 PROBLEM — E78.2 MIXED HYPERLIPIDEMIA: Status: ACTIVE | Noted: 2021-07-05

## 2025-03-30 PROBLEM — K25.4: Status: ACTIVE | Noted: 2025-03-30

## 2025-03-30 PROBLEM — D50.0 CHRONIC BLOOD LOSS ANEMIA: Status: ACTIVE | Noted: 2025-03-30

## 2025-03-30 NOTE — PROGRESS NOTES
[facility] GERIATRIC SERVICES  Chief Complaint   Patient presents with    [facility] F/U     [facility] Medical Record Number:  [id number]  Place of Service where encounter took place:  [facility] ([facility]) [[id number]]  Code Status:  Unknown     HISTORY:      HPI:  Elizabeth Kelley  is 79 year old (1945) undergoing physical and occupational therapy. She is with has PMHx of recurrent right lower extremity cellulitis, chronic lower extremity lymphedema, chronic venous stasis, chronic anemia, RLS, and HLD. She has a history of recurrent hospitalizations for lower extremity cellulitis.  Excerpted from records Patient recently was admitted 1/12 - 1/18/2025 for RIGHT lower extremity cellulitis and sepsis, treated with meropenem and vancomycin, and she was discharged on doxycycline.  She was again hospitalized 3/12 - 3/25/25 for LEFT lower extremity cellulitis. She was treated with Vanco and Zosyn/ceftriaxone and discharged with Keflex. She was recommended to be discharged to TCU, but she declined. She returned to the ER on 3/26/25 for inability to care for self at home and new onset diarrhea.     Today she is seen to review vital signs, labs, routine visit and to establish care.  She denied chest pain shortness of breath cough congestion constipation or diarrhea.  She is with bilateral lower extremity cellulitis.  Right leg significantly larger than the left and she tells writer she has had ultrasounds done to rule out DVTs.  She also reports this is the first time having the cellulitis of the left leg it is normally just the right only.  She will complete Keflex and minocycline on 4/1/2025 and then will be on a lower dose of the minocycline indefinitely.  Wound care team to monitor lower extremity wounds.  Hemoglobin today 9.1 up from 8.3 she did receive blood transfusions in the hospital.  Per her report she lives with her significant other    ALLERGIES:Other environmental allergy, Adhesive tape, Adhesive  [cyanoacrylate], Latex, Ragweeds, Oxycodone-acetaminophen, and Vicodin [hydrocodone-acetaminophen]    PAST MEDICAL HISTORY:   Past Medical History:   Diagnosis Date    Ankle contracture, left     Class 3 severe obesity due to excess calories without serious comorbidity with body mass index (BMI) of 45.0 to 49.9 in adult (H)     Combined gastric and duodenal ulcer     Controlled substance agreement broken     Depression     Dyslipidemia     Edema     Edema     Gait abnormality     GERD (gastroesophageal reflux disease)     Gout     Lymphedema of both lower extremities     Melanoma (H)     left upper arm    MS (multiple sclerosis) (H)     RLS (restless legs syndrome)     Skin ulcer of left lower leg (H)     Valgus deformity of both feet     Vitamin D deficiency        PAST SURGICAL HISTORY:   has a past surgical history that includes Mammoplasty reduction (Bilateral); Cosmetic surgery (1988); Esophagoscopy, gastroscopy, duodenoscopy (EGD), combined (N/A, 03/30/2015); Spine surgery; Ablation Saphenous Vein W/ Rfa (Right, 04/12/2021); Mohs micrographic procedure; PICC/Midline Placement (8/13/2021); PICC/Midline Placement (9/2/2021); Irrigation and debridement lower extremity, combined (Right, 3/21/2022); Irrigation and debridement lower extremity, combined (Right, 3/28/2022); PICC/Midline Placement (7/21/2024); PICC/Midline Placement (9/8/2024); PICC/Midline Placement (1/14/2025); PICC/Midline Placement (3/20/2025); and Esophagoscopy, gastroscopy, duodenoscopy (EGD), combined (N/A, 3/21/2025).    FAMILY HISTORY: family history includes Arthritis in her maternal grandmother, maternal uncle, paternal grandfather, paternal grandmother, and sister; Asthma in her sister; Brain Cancer in her father; Breast Cancer in her paternal aunt; Colon Cancer in her paternal aunt; Early Death in her maternal grandfather; Hyperlipidemia in her mother, paternal aunt, and sister; Hypertension in her mother, paternal aunt, and sister;  Multiple Sclerosis in her mother and sister; Obesity in her sister; Uterine Cancer in her mother.    SOCIAL HISTORY:  reports that she quit smoking about 49 years ago. Her smoking use included cigarettes. She started smoking about 61 years ago. She has a 3 pack-year smoking history. She has never used smokeless tobacco. She reports current alcohol use of about 1.0 standard drink of alcohol per week. She reports that she does not use drugs.    ROS:  Constitutional: Negative for activity change, appetite change, fatigue and fever.   HENT: Negative for congestion.    Respiratory: Negative for cough, shortness of breath and wheezing.    Cardiovascular: Negative for chest pain and positive leg swelling.   Gastrointestinal: Negative for abdominal distention, abdominal pain, constipation, diarrhea and nausea.   Genitourinary: Negative for dysuria.   Musculoskeletal: Negative for arthralgia. Negative for back pain.   Skin: Negative for color change and wound.  Bilateral lower extremity wounds dressing changes in place wound care team to follow  Neurological: Negative for dizziness.   Psychiatric/Behavioral: Negative for agitation, behavioral problems and confusion.     Physical Exam:  Constitutional:       Appearance: Patient is well-developed.   HENT:      Head: Normocephalic.   Eyes:      Conjunctiva/sclera: Conjunctivae normal.   Neck:      Musculoskeletal: Normal range of motion.   Cardiovascular:      Rate and Rhythm: Normal rate and regular rhythm.      Heart sounds: Normal heart sounds. No murmur.   Pulmonary:      Effort: No respiratory distress.      Breath sounds: Normal breath sounds. No wheezing or rales.   Abdominal:      General: Bowel sounds are normal. There is no distension.      Palpations: Abdomen is soft.      Tenderness: There is no abdominal tenderness.   Musculoskeletal:       Normal range of motion.  Lower extremity edema right leg greater than the left  Skin:General:        Skin is warm.  Bilateral  lower extremity cellulitis  Neurological:         Mental Status: Patient is alert and oriented to person, place, and time.   Psychiatric:         Behavior: Behavior normal.     Vitals:/55   Pulse 73   Temp 98.1  F (36.7  C)   Resp 18   Ht 1.524 m (5')   Wt 115.4 kg (254 lb 6.4 oz)   SpO2 (!) 91%   BMI 49.68 kg/m   and Body mass index is 49.68 kg/m .    Lab/Diagnostic data:   Recent Results (from the past 240 hours)   CBC with platelets    Collection Time: 03/22/25  5:14 AM   Result Value Ref Range    WBC Count 9.6 4.0 - 11.0 10e3/uL    RBC Count 2.91 (L) 3.80 - 5.20 10e6/uL    Hemoglobin 7.5 (L) 11.7 - 15.7 g/dL    Hematocrit 24.0 (L) 35.0 - 47.0 %    MCV 83 78 - 100 fL    MCH 25.8 (L) 26.5 - 33.0 pg    MCHC 31.3 (L) 31.5 - 36.5 g/dL    RDW 18.5 (H) 10.0 - 15.0 %    Platelet Count 567 (H) 150 - 450 10e3/uL   Basic metabolic panel    Collection Time: 03/22/25  5:14 AM   Result Value Ref Range    Sodium 137 135 - 145 mmol/L    Potassium 3.5 3.4 - 5.3 mmol/L    Chloride 99 98 - 107 mmol/L    Carbon Dioxide (CO2) 31 (H) 22 - 29 mmol/L    Anion Gap 7 7 - 15 mmol/L    Urea Nitrogen 17.7 8.0 - 23.0 mg/dL    Creatinine 0.86 0.51 - 0.95 mg/dL    GFR Estimate 69 >60 mL/min/1.73m2    Calcium 8.6 (L) 8.8 - 10.4 mg/dL    Glucose 98 70 - 99 mg/dL   CRP inflammation    Collection Time: 03/22/25  5:14 AM   Result Value Ref Range    CRP Inflammation 150.70 (H) <5.00 mg/L   Creatinine    Collection Time: 03/23/25  5:39 AM   Result Value Ref Range    Creatinine 0.85 0.51 - 0.95 mg/dL    GFR Estimate 70 >60 mL/min/1.73m2   Hemoglobin    Collection Time: 03/23/25  5:39 AM   Result Value Ref Range    Hemoglobin 7.4 (L) 11.7 - 15.7 g/dL   Platelet count    Collection Time: 03/24/25  6:36 AM   Result Value Ref Range    Platelet Count 573 (H) 150 - 450 10e3/uL   Hemoglobin    Collection Time: 03/24/25  9:24 AM   Result Value Ref Range    Hemoglobin 7.8 (L) 11.7 - 15.7 g/dL   CRP inflammation    Collection Time: 03/25/25  6:36  AM   Result Value Ref Range    CRP Inflammation 150.20 (H) <5.00 mg/L   CBC with platelets and differential    Collection Time: 03/25/25  6:36 AM   Result Value Ref Range    WBC Count 8.3 4.0 - 11.0 10e3/uL    RBC Count 3.37 (L) 3.80 - 5.20 10e6/uL    Hemoglobin 8.3 (L) 11.7 - 15.7 g/dL    Hematocrit 28.1 (L) 35.0 - 47.0 %    MCV 83 78 - 100 fL    MCH 24.6 (L) 26.5 - 33.0 pg    MCHC 29.5 (L) 31.5 - 36.5 g/dL    RDW 18.9 (H) 10.0 - 15.0 %    Platelet Count 639 (H) 150 - 450 10e3/uL    % Neutrophils 65 %    % Lymphocytes 16 %    % Monocytes 15 %    % Eosinophils 2 %    % Basophils 1 %    % Immature Granulocytes 1 %    NRBCs per 100 WBC 0 <1 /100    Absolute Neutrophils 5.4 1.6 - 8.3 10e3/uL    Absolute Lymphocytes 1.3 0.8 - 5.3 10e3/uL    Absolute Monocytes 1.3 0.0 - 1.3 10e3/uL    Absolute Eosinophils 0.2 0.0 - 0.7 10e3/uL    Absolute Basophils 0.1 0.0 - 0.2 10e3/uL    Absolute Immature Granulocytes 0.1 <=0.4 10e3/uL    Absolute NRBCs 0.0 10e3/uL   Basic metabolic panel    Collection Time: 03/26/25  8:54 PM   Result Value Ref Range    Sodium 140 135 - 145 mmol/L    Potassium 4.2 3.4 - 5.3 mmol/L    Chloride 98 98 - 107 mmol/L    Carbon Dioxide (CO2) 34 (H) 22 - 29 mmol/L    Anion Gap 8 7 - 15 mmol/L    Urea Nitrogen 16.1 8.0 - 23.0 mg/dL    Creatinine 0.96 (H) 0.51 - 0.95 mg/dL    GFR Estimate 61 >60 mL/min/1.73m2    Calcium 9.0 8.8 - 10.4 mg/dL    Glucose 91 70 - 99 mg/dL   Lactic acid whole blood with 1x repeat in 2 hr when >2    Collection Time: 03/26/25  8:54 PM   Result Value Ref Range    Lactic Acid, Initial 1.4 0.7 - 2.0 mmol/L   CBC (+ platelets, no diff)    Collection Time: 03/26/25  8:54 PM   Result Value Ref Range    WBC Count 10.4 4.0 - 11.0 10e3/uL    RBC Count 3.46 (L) 3.80 - 5.20 10e6/uL    Hemoglobin 8.8 (L) 11.7 - 15.7 g/dL    Hematocrit 28.6 (L) 35.0 - 47.0 %    MCV 83 78 - 100 fL    MCH 25.4 (L) 26.5 - 33.0 pg    MCHC 30.8 (L) 31.5 - 36.5 g/dL    RDW 18.6 (H) 10.0 - 15.0 %    Platelet Count 698 (H)  150 - 450 10e3/uL   Magnesium    Collection Time: 03/26/25  8:54 PM   Result Value Ref Range    Magnesium 1.9 1.7 - 2.3 mg/dL   CRP inflammation    Collection Time: 03/26/25  8:54 PM   Result Value Ref Range    CRP Inflammation 207.30 (H) <5.00 mg/L   TSH with free T4 reflex    Collection Time: 03/26/25  8:54 PM   Result Value Ref Range    TSH 5.42 (H) 0.30 - 4.20 uIU/mL   T4 free    Collection Time: 03/26/25  8:54 PM   Result Value Ref Range    Free T4 1.33 0.90 - 1.70 ng/dL   UA with Microscopic reflex to Culture    Collection Time: 03/27/25  6:24 AM    Specimen: Urine, Catheter   Result Value Ref Range    Color Urine Yellow Colorless, Straw, Light Yellow, Yellow    Appearance Urine Clear Clear    Glucose Urine Negative Negative mg/dL    Bilirubin Urine Negative Negative    Ketones Urine Trace (A) Negative mg/dL    Specific Gravity Urine 1.034 (H) 1.001 - 1.030    Blood Urine Negative Negative    pH Urine 6.5 5.0 - 7.0    Protein Albumin Urine 50 (A) Negative mg/dL    Urobilinogen Urine 4.0 (A) Normal mg/dL    Nitrite Urine Negative Negative    Leukocyte Esterase Urine Negative Negative    RBC Urine 1 <=2 /HPF    WBC Urine 3 <=5 /HPF    Squamous Epithelials Urine 4 (H) <=1 /HPF   CBC with platelets    Collection Time: 03/27/25  8:27 AM   Result Value Ref Range    WBC Count 9.0 4.0 - 11.0 10e3/uL    RBC Count 3.06 (L) 3.80 - 5.20 10e6/uL    Hemoglobin 7.8 (L) 11.7 - 15.7 g/dL    Hematocrit 25.4 (L) 35.0 - 47.0 %    MCV 83 78 - 100 fL    MCH 25.5 (L) 26.5 - 33.0 pg    MCHC 30.7 (L) 31.5 - 36.5 g/dL    RDW 18.6 (H) 10.0 - 15.0 %    Platelet Count 617 (H) 150 - 450 10e3/uL   Extra Green Top (Lithium Heparin) Tube    Collection Time: 03/27/25  8:27 AM   Result Value Ref Range    Hold Specimen [facility]    Enteric Bacteria and Virus Panel PCR    Collection Time: 03/27/25  9:35 AM    Specimen: Per Rectum; Stool   Result Value Ref Range    Campylobacter species Negative Negative    Salmonella species Negative Negative    Vibrio  species Negative Negative    Vibrio cholerae Negative Negative    Yersinia enterocolitica Negative Negative    Enteropathogenic E. coli (EPEC) Negative Negative, NA    Shiga-like toxin-producing E. coli (STEC) Negative Negative    Shigella/Enteroinvasive E. coli (EIEC) Negative Negative    Cryptosporidium species Negative Negative    Giardia lamblia Negative Negative    Norovirus Gl/Gll Negative Negative    Rotavirus A Negative Negative    Plesiomonas shigelloides Negative Negative    Enteroaggregative E. coli (EAEC) Negative Negative    Enterotoxigenic E. coli (ETEC) Negative Negative    E. coli O157 NA Negative, NA    Cyclospora cayetanensis Negative Negative    Entamoeba histolytica Negative Negative    Adenovirus F40/41 Negative Negative    Astrovirus Negative Negative    Sapovirus Negative Negative   C. difficile Toxin B PCR with reflex to C. difficile EIA    Collection Time: 03/27/25  9:35 AM    Specimen: Per Rectum; Stool   Result Value Ref Range    C Difficile Toxin B by PCR Negative Negative   CRP inflammation    Collection Time: 03/28/25  7:33 AM   Result Value Ref Range    CRP Inflammation 164.70 (H) <5.00 mg/L   Basic metabolic panel    Collection Time: 03/28/25  7:33 AM   Result Value Ref Range    Sodium 138 135 - 145 mmol/L    Potassium 4.5 3.4 - 5.3 mmol/L    Chloride 98 98 - 107 mmol/L    Carbon Dioxide (CO2) 33 (H) 22 - 29 mmol/L    Anion Gap 7 7 - 15 mmol/L    Urea Nitrogen 13.3 8.0 - 23.0 mg/dL    Creatinine 1.07 (H) 0.51 - 0.95 mg/dL    GFR Estimate 53 (L) >60 mL/min/1.73m2    Calcium 8.5 (L) 8.8 - 10.4 mg/dL    Glucose 95 70 - 99 mg/dL   CBC with platelets    Collection Time: 03/28/25  7:33 AM   Result Value Ref Range    WBC Count 6.5 4.0 - 11.0 10e3/uL    RBC Count 3.06 (L) 3.80 - 5.20 10e6/uL    Hemoglobin 7.7 (L) 11.7 - 15.7 g/dL    Hematocrit 25.7 (L) 35.0 - 47.0 %    MCV 84 78 - 100 fL    MCH 25.2 (L) 26.5 - 33.0 pg    MCHC 30.0 (L) 31.5 - 36.5 g/dL    RDW 18.5 (H) 10.0 - 15.0 %    Platelet  Count 587 (H) 150 - 450 10e3/uL   Glucose by meter    Collection Time: 03/29/25  7:49 AM   Result Value Ref Range    GLUCOSE BY METER POCT 89 70 - 99 mg/dL   Hemoglobin    Collection Time: 03/29/25  7:51 AM   Result Value Ref Range    Hemoglobin 8.3 (L) 11.7 - 15.7 g/dL   Extra Green Top Tube (LAB USE ONLY)    Collection Time: 03/29/25  7:51 AM   Result Value Ref Range    Hold Specimen [facility]    Hemoglobin    Collection Time: 03/31/25  8:09 AM   Result Value Ref Range    Hemoglobin 9.1 (L) 11.7 - 15.7 g/dL        MEDICATIONS:  MED REC REQUIRED  Post Medication Reconciliation Status: discharge medications reconciled, continue medications without change          Review of your medicines            Accurate as of March 30, 2025 12:00 PM. If you have any questions, ask your nurse or doctor.                CONTINUE these medicines which have NOT CHANGED        Dose / Directions   acetaminophen 500 MG tablet  Commonly known as: TYLENOL      Dose: 1,000 mg  Take 1,000 mg by mouth daily as needed for pain.  Refills: 0     ascorbic acid 250 MG Chew chewable tablet  Commonly known as: vitamin C      Dose: 250 mg  Take 250 mg by mouth daily.  Refills: 0     buPROPion 150 MG 12 hr tablet  Commonly known as: WELLBUTRIN SR      Dose: 150 mg  Take 150 mg by mouth every morning  Refills: 0     Carboxymethylcellulose Sodium 0.25 % Soln      Dose: 1 drop  Apply 1 drop to eye daily as needed  Refills: 0     cephALEXin 500 MG capsule  Commonly known as: KEFLEX  Indication: Infection of the Skin and/or Soft Tissue  Used for: Cellulitis of right lower leg [L03.115]      Dose: 1,000 mg  Take 2 capsules (1,000 mg) by mouth every 8 hours for 3 days.  Refills: 0     citalopram 40 MG tablet  Commonly known as: celeXA      Dose: 40 mg  Take 40 mg by mouth daily (with lunch)  Refills: 0     ferrous sulfate 325 (65 Fe) MG tablet  Commonly known as: FEROSUL      Dose: 325 mg  Take 325 mg by mouth every other day.  Refills: 0     furosemide 20 MG  tablet  Commonly known as: LASIX  Used for: Cellulitis of right lower leg [L03.115]      Dose: 60 mg  Take 3 tablets (60 mg) by mouth daily.  Quantity: 30 tablet  Refills: 1     gabapentin 100 MG capsule  Commonly known as: NEURONTIN      Dose: 100 mg  Take 100 mg by mouth 3 times daily  Refills: 0     HYDROmorphone 2 MG tablet  Commonly known as: DILAUDID  Used for: Cellulitis of left lower extremity [L03.116], Cellulitis of right lower leg [L03.115]      Dose: 1-2 mg  Take 0.5-1 tablets (1-2 mg) by mouth every 6 hours as needed for moderate to severe pain.  Quantity: 15 tablet  Refills: 0     lactobacillus rhamnosus (GG) capsule  Used for: Cellulitis of left lower extremity [L03.116], Cellulitis of right lower leg [L03.115]      Dose: 1 capsule  Take 1 capsule by mouth 2 times daily.  Refills: 0     magnesium oxide 400 MG tablet  Commonly known as: MAG-OX      Dose: 1 tablet  Take 1 tablet by mouth every morning  Refills: 0     * minocycline 100 MG capsule  Commonly known as: MINOCIN  Indication: Infection of the Skin and/or Soft Tissue  Used for: Cellulitis of right lower leg [L03.115]      Dose: 100 mg  Take 1 capsule (100 mg) by mouth every 12 hours for 3 days.  Refills: 0     * minocycline 50 MG tablet  Commonly known as: DYNACIN  Used for: Cellulitis of right lower leg [L03.115]      Dose: 50 mg  Start taking on: April 2, 2025  Take 1 tablet (50 mg) by mouth daily.  Refills: 0     multivitamin w/minerals tablet      Dose: 1 tablet  Take 1 tablet by mouth every morning  Refills: 0     nitroGLYcerin 0.4 MG sublingual tablet  Commonly known as: NITROSTAT      PLACE 1 TABLET UNDER TONGUE EVERY 5 MINTUES AS NEEDED FOR CHEST PAIN FOR UP TO 30 DAYS  Refills: 0     nystatin 311730 UNIT/GM external powder  Commonly known as: MYCOSTATIN      Apply topically 2 times daily as needed.  Refills: 0     pantoprazole 40 MG EC tablet  Commonly known as: PROTONIX  Used for: Cellulitis of right lower leg [L03.115]      Dose: 40  mg  Take 1 tablet (40 mg) by mouth 2 times daily.  Quantity: 60 tablet  Refills: 1     potassium chloride ER 20 MEQ CR tablet  Commonly known as: K-TAB      Dose: 20 mEq  Take 20 mEq by mouth 2 times daily.  Refills: 0     rOPINIRole 1 MG tablet  Commonly known as: REQUIP      Dose: 0.5 mg  Take 0.5 mg by mouth 2 times daily. Take one-half tablet (dose = 0.5 mg) twice daily. Once in the morning and once about dinnertime.     In addition, take one tablet (1 mg) at bedtime.  Refills: 0     simvastatin 40 MG tablet  Commonly known as: ZOCOR      Dose: 40 mg  [SIMVASTATIN (ZOCOR) 40 MG TABLET] Take 40 mg by mouth bedtime.  Refills: 0     spironolactone 25 MG tablet  Commonly known as: ALDACTONE      Dose: 25 mg  Take 25 mg by mouth every morning  Refills: 0     VASHE WOUND THERAPY EX      Externally apply topically daily as needed  Refills: 0     vitamin B-Complex      Dose: 1 tablet  Take 1 tablet by mouth daily.  Refills: 0     vitamin D3 250 mcg (88636 units) capsule  Commonly known as: CHOLECALCIFEROL      Dose: 1 capsule  Take 1 capsule by mouth daily.  Refills: 0     zinc 50 MG Tabs      Dose: 1 tablet  Take 1 tablet by mouth three times a week. On Tues, Thur, Sat  Refills: 0           * This list has 2 medication(s) that are the same as other medications prescribed for you. Read the directions carefully, and ask your doctor or other care provider to review them with you.                  ASSESSMENT/PLAN  Encounter Diagnoses   Name Primary?    Physical deconditioning Yes    Chronic pain syndrome     Cellulitis of right lower leg     Cellulitis of left lower extremity      Physical deconditioning PT OT    Pain management as needed Tylenol daily, Dilaudid 1 to 2 mg every 6 hours as needed    GERD on Protonix    Cellulitis bilateral lower extremities dressing changes as ordered, will complete minocycline and Keflex on 4/1/2025 and then be on a lower dose minocycline indefinitely    Restless leg syndrome continue  ropinirole    Mood disorder on bupropion, Celexa 40 mg daily    Anemia on ferrous sulfate daily monitor labs hemoglobin on 3/31/2025 was 9.1 up from 8.3    Edema continue Lasix 20 mg daily    Neuropathy continue gabapentin    CHF Daily weights, continue Lasix spironolactone    Electronically signed by: Rosario Styles CNP

## 2025-03-31 ENCOUNTER — TELEPHONE (OUTPATIENT)
Dept: GERIATRICS | Facility: CLINIC | Age: 80
End: 2025-03-31

## 2025-03-31 ENCOUNTER — TRANSITIONAL CARE UNIT VISIT (OUTPATIENT)
Dept: GERIATRICS | Facility: CLINIC | Age: 80
End: 2025-03-31
Payer: COMMERCIAL

## 2025-03-31 DIAGNOSIS — G89.4 CHRONIC PAIN SYNDROME: ICD-10-CM

## 2025-03-31 DIAGNOSIS — L03.115 CELLULITIS OF RIGHT LOWER LEG: ICD-10-CM

## 2025-03-31 DIAGNOSIS — L03.116 CELLULITIS OF LEFT LOWER EXTREMITY: ICD-10-CM

## 2025-03-31 DIAGNOSIS — R53.81 PHYSICAL DECONDITIONING: Primary | ICD-10-CM

## 2025-03-31 LAB — HGB BLD-MCNC: 9.1 G/DL (ref 11.7–15.7)

## 2025-03-31 PROCEDURE — 85018 HEMOGLOBIN: CPT | Mod: ORL | Performed by: FAMILY MEDICINE

## 2025-03-31 PROCEDURE — 36415 COLL VENOUS BLD VENIPUNCTURE: CPT | Mod: ORL | Performed by: FAMILY MEDICINE

## 2025-03-31 PROCEDURE — P9604 ONE-WAY ALLOW PRORATED TRIP: HCPCS | Mod: ORL | Performed by: FAMILY MEDICINE

## 2025-03-31 NOTE — TELEPHONE ENCOUNTER
----- Message from Rosario Styles sent at 3/31/2025  1:06 PM CDT -----  Check a CBC in one week 4/7/25- anemia

## 2025-03-31 NOTE — LETTER
3/31/2025      Elizabeth Kelley  [address]  [facility] [address]        [facility]  Chief Complaint   Patient presents with     [facility] F/U     [facility] Record Number:  [id number]  Place of Service where encounter took place:  [facility] ([facility]) [[id number]]  Code Status:  Unknown     HISTORY:      HPI:  Elizabeth Kelley  is 79 year old (1945) undergoing physical and occupational therapy. She is with has PMHx of recurrent right lower extremity cellulitis, chronic lower extremity lymphedema, chronic venous stasis, chronic anemia, RLS, and HLD. She has a history of recurrent hospitalizations for lower extremity cellulitis.  Excerpted from records Patient recently was admitted 1/12 - 1/18/2025 for RIGHT lower extremity cellulitis and sepsis, treated with meropenem and vancomycin, and she was discharged on doxycycline.  She was again hospitalized 3/12 - 3/25/25 for LEFT lower extremity cellulitis. She was treated with Vanco and Zosyn/ceftriaxone and discharged with Keflex. She was recommended to be discharged to TCU, but she declined. She returned to the ER on 3/26/25 for inability to care for self at home and new onset diarrhea.     Today she is seen to review vital signs, labs, routine visit and to establish care.  She denied chest pain shortness of breath cough congestion constipation or diarrhea.  She is with bilateral lower extremity cellulitis.  Right leg significantly larger than the left and she tells writer she has had ultrasounds done to rule out DVTs.  She also reports this is the first time having the cellulitis of the left leg it is normally just the right only.  She will complete Keflex and minocycline on 4/1/2025 and then will be on a lower dose of the minocycline indefinitely.  Wound care team to monitor lower extremity wounds.  Hemoglobin today 9.1 up from 8.3 she did receive blood transfusions in the hospital.  Per her report she lives with her  significant other    ALLERGIES:Other environmental allergy, Adhesive tape, Adhesive [cyanoacrylate], Latex, Ragweeds, Oxycodone-acetaminophen, and Vicodin [hydrocodone-acetaminophen]    PAST MEDICAL HISTORY:   Past Medical History:   Diagnosis Date     Ankle contracture, left      Class 3 severe obesity due to excess calories without serious comorbidity with body mass index (BMI) of 45.0 to 49.9 in adult (H)      Combined gastric and duodenal ulcer      Controlled substance agreement broken      Depression      Dyslipidemia      Edema      Edema      Gait abnormality      GERD (gastroesophageal reflux disease)      Gout      Lymphedema of both lower extremities      Melanoma (H)     left upper arm     MS (multiple sclerosis) (H)      RLS (restless legs syndrome)      Skin ulcer of left lower leg (H)      Valgus deformity of both feet      Vitamin D deficiency        PAST SURGICAL HISTORY:   has a past surgical history that includes Mammoplasty reduction (Bilateral); Cosmetic surgery (1988); Esophagoscopy, gastroscopy, duodenoscopy (EGD), combined (N/A, 03/30/2015); Spine surgery; Ablation Saphenous Vein W/ Rfa (Right, 04/12/2021); Mohs micrographic procedure; PICC/Midline Placement (8/13/2021); PICC/Midline Placement (9/2/2021); Irrigation and debridement lower extremity, combined (Right, 3/21/2022); Irrigation and debridement lower extremity, combined (Right, 3/28/2022); PICC/Midline Placement (7/21/2024); PICC/Midline Placement (9/8/2024); PICC/Midline Placement (1/14/2025); PICC/Midline Placement (3/20/2025); and Esophagoscopy, gastroscopy, duodenoscopy (EGD), combined (N/A, 3/21/2025).    FAMILY HISTORY: family history includes Arthritis in her maternal grandmother, maternal uncle, paternal grandfather, paternal grandmother, and sister; Asthma in her sister; Brain Cancer in her father; Breast Cancer in her paternal aunt; Colon Cancer in her paternal aunt; Early Death in her maternal grandfather; Hyperlipidemia  in her mother, paternal aunt, and sister; Hypertension in her mother, paternal aunt, and sister; Multiple Sclerosis in her mother and sister; Obesity in her sister; Uterine Cancer in her mother.    SOCIAL HISTORY:  reports that she quit smoking about 49 years ago. Her smoking use included cigarettes. She started smoking about 61 years ago. She has a 3 pack-year smoking history. She has never used smokeless tobacco. She reports current alcohol use of about 1.0 standard drink of alcohol per week. She reports that she does not use drugs.    ROS:  Constitutional: Negative for activity change, appetite change, fatigue and fever.   HENT: Negative for congestion.    Respiratory: Negative for cough, shortness of breath and wheezing.    Cardiovascular: Negative for chest pain and positive leg swelling.   Gastrointestinal: Negative for abdominal distention, abdominal pain, constipation, diarrhea and nausea.   Genitourinary: Negative for dysuria.   Musculoskeletal: Negative for arthralgia. Negative for back pain.   Skin: Negative for color change and wound.  Bilateral lower extremity wounds dressing changes in place wound care team to follow  Neurological: Negative for dizziness.   Psychiatric/Behavioral: Negative for agitation, behavioral problems and confusion.     Physical Exam:  Constitutional:       Appearance: Patient is well-developed.   HENT:      Head: Normocephalic.   Eyes:      Conjunctiva/sclera: Conjunctivae normal.   Neck:      Musculoskeletal: Normal range of motion.   Cardiovascular:      Rate and Rhythm: Normal rate and regular rhythm.      Heart sounds: Normal heart sounds. No murmur.   Pulmonary:      Effort: No respiratory distress.      Breath sounds: Normal breath sounds. No wheezing or rales.   Abdominal:      General: Bowel sounds are normal. There is no distension.      Palpations: Abdomen is soft.      Tenderness: There is no abdominal tenderness.   Musculoskeletal:       Normal range of motion.   Lower extremity edema right leg greater than the left  Skin:General:        Skin is warm.  Bilateral lower extremity cellulitis  Neurological:         Mental Status: Patient is alert and oriented to person, place, and time.   Psychiatric:         Behavior: Behavior normal.     Vitals:/55   Pulse 73   Temp 98.1  F (36.7  C)   Resp 18   Ht 1.524 m (5')   Wt 115.4 kg (254 lb 6.4 oz)   SpO2 (!) 91%   BMI 49.68 kg/m   and Body mass index is 49.68 kg/m .    Lab/Diagnostic data:   Recent Results (from the past 240 hours)   CBC with platelets    Collection Time: 03/22/25  5:14 AM   Result Value Ref Range    WBC Count 9.6 4.0 - 11.0 10e3/uL    RBC Count 2.91 (L) 3.80 - 5.20 10e6/uL    Hemoglobin 7.5 (L) 11.7 - 15.7 g/dL    Hematocrit 24.0 (L) 35.0 - 47.0 %    MCV 83 78 - 100 fL    MCH 25.8 (L) 26.5 - 33.0 pg    MCHC 31.3 (L) 31.5 - 36.5 g/dL    RDW 18.5 (H) 10.0 - 15.0 %    Platelet Count 567 (H) 150 - 450 10e3/uL   Basic metabolic panel    Collection Time: 03/22/25  5:14 AM   Result Value Ref Range    Sodium 137 135 - 145 mmol/L    Potassium 3.5 3.4 - 5.3 mmol/L    Chloride 99 98 - 107 mmol/L    Carbon Dioxide (CO2) 31 (H) 22 - 29 mmol/L    Anion Gap 7 7 - 15 mmol/L    Urea Nitrogen 17.7 8.0 - 23.0 mg/dL    Creatinine 0.86 0.51 - 0.95 mg/dL    GFR Estimate 69 >60 mL/min/1.73m2    Calcium 8.6 (L) 8.8 - 10.4 mg/dL    Glucose 98 70 - 99 mg/dL   CRP inflammation    Collection Time: 03/22/25  5:14 AM   Result Value Ref Range    CRP Inflammation 150.70 (H) <5.00 mg/L   Creatinine    Collection Time: 03/23/25  5:39 AM   Result Value Ref Range    Creatinine 0.85 0.51 - 0.95 mg/dL    GFR Estimate 70 >60 mL/min/1.73m2   Hemoglobin    Collection Time: 03/23/25  5:39 AM   Result Value Ref Range    Hemoglobin 7.4 (L) 11.7 - 15.7 g/dL   Platelet count    Collection Time: 03/24/25  6:36 AM   Result Value Ref Range    Platelet Count 573 (H) 150 - 450 10e3/uL   Hemoglobin    Collection Time: 03/24/25  9:24 AM   Result Value Ref  Range    Hemoglobin 7.8 (L) 11.7 - 15.7 g/dL   CRP inflammation    Collection Time: 03/25/25  6:36 AM   Result Value Ref Range    CRP Inflammation 150.20 (H) <5.00 mg/L   CBC with platelets and differential    Collection Time: 03/25/25  6:36 AM   Result Value Ref Range    WBC Count 8.3 4.0 - 11.0 10e3/uL    RBC Count 3.37 (L) 3.80 - 5.20 10e6/uL    Hemoglobin 8.3 (L) 11.7 - 15.7 g/dL    Hematocrit 28.1 (L) 35.0 - 47.0 %    MCV 83 78 - 100 fL    MCH 24.6 (L) 26.5 - 33.0 pg    MCHC 29.5 (L) 31.5 - 36.5 g/dL    RDW 18.9 (H) 10.0 - 15.0 %    Platelet Count 639 (H) 150 - 450 10e3/uL    % Neutrophils 65 %    % Lymphocytes 16 %    % Monocytes 15 %    % Eosinophils 2 %    % Basophils 1 %    % Immature Granulocytes 1 %    NRBCs per 100 WBC 0 <1 /100    Absolute Neutrophils 5.4 1.6 - 8.3 10e3/uL    Absolute Lymphocytes 1.3 0.8 - 5.3 10e3/uL    Absolute Monocytes 1.3 0.0 - 1.3 10e3/uL    Absolute Eosinophils 0.2 0.0 - 0.7 10e3/uL    Absolute Basophils 0.1 0.0 - 0.2 10e3/uL    Absolute Immature Granulocytes 0.1 <=0.4 10e3/uL    Absolute NRBCs 0.0 10e3/uL   Basic metabolic panel    Collection Time: 03/26/25  8:54 PM   Result Value Ref Range    Sodium 140 135 - 145 mmol/L    Potassium 4.2 3.4 - 5.3 mmol/L    Chloride 98 98 - 107 mmol/L    Carbon Dioxide (CO2) 34 (H) 22 - 29 mmol/L    Anion Gap 8 7 - 15 mmol/L    Urea Nitrogen 16.1 8.0 - 23.0 mg/dL    Creatinine 0.96 (H) 0.51 - 0.95 mg/dL    GFR Estimate 61 >60 mL/min/1.73m2    Calcium 9.0 8.8 - 10.4 mg/dL    Glucose 91 70 - 99 mg/dL   Lactic acid whole blood with 1x repeat in 2 hr when >2    Collection Time: 03/26/25  8:54 PM   Result Value Ref Range    Lactic Acid, Initial 1.4 0.7 - 2.0 mmol/L   CBC (+ platelets, no diff)    Collection Time: 03/26/25  8:54 PM   Result Value Ref Range    WBC Count 10.4 4.0 - 11.0 10e3/uL    RBC Count 3.46 (L) 3.80 - 5.20 10e6/uL    Hemoglobin 8.8 (L) 11.7 - 15.7 g/dL    Hematocrit 28.6 (L) 35.0 - 47.0 %    MCV 83 78 - 100 fL    MCH 25.4 (L) 26.5  - 33.0 pg    MCHC 30.8 (L) 31.5 - 36.5 g/dL    RDW 18.6 (H) 10.0 - 15.0 %    Platelet Count 698 (H) 150 - 450 10e3/uL   Magnesium    Collection Time: 03/26/25  8:54 PM   Result Value Ref Range    Magnesium 1.9 1.7 - 2.3 mg/dL   CRP inflammation    Collection Time: 03/26/25  8:54 PM   Result Value Ref Range    CRP Inflammation 207.30 (H) <5.00 mg/L   TSH with free T4 reflex    Collection Time: 03/26/25  8:54 PM   Result Value Ref Range    TSH 5.42 (H) 0.30 - 4.20 uIU/mL   T4 free    Collection Time: 03/26/25  8:54 PM   Result Value Ref Range    Free T4 1.33 0.90 - 1.70 ng/dL   UA with Microscopic reflex to Culture    Collection Time: 03/27/25  6:24 AM    Specimen: Urine, Catheter   Result Value Ref Range    Color Urine Yellow Colorless, Straw, Light Yellow, Yellow    Appearance Urine Clear Clear    Glucose Urine Negative Negative mg/dL    Bilirubin Urine Negative Negative    Ketones Urine Trace (A) Negative mg/dL    Specific Gravity Urine 1.034 (H) 1.001 - 1.030    Blood Urine Negative Negative    pH Urine 6.5 5.0 - 7.0    Protein Albumin Urine 50 (A) Negative mg/dL    Urobilinogen Urine 4.0 (A) Normal mg/dL    Nitrite Urine Negative Negative    Leukocyte Esterase Urine Negative Negative    RBC Urine 1 <=2 /HPF    WBC Urine 3 <=5 /HPF    Squamous Epithelials Urine 4 (H) <=1 /HPF   CBC with platelets    Collection Time: 03/27/25  8:27 AM   Result Value Ref Range    WBC Count 9.0 4.0 - 11.0 10e3/uL    RBC Count 3.06 (L) 3.80 - 5.20 10e6/uL    Hemoglobin 7.8 (L) 11.7 - 15.7 g/dL    Hematocrit 25.4 (L) 35.0 - 47.0 %    MCV 83 78 - 100 fL    MCH 25.5 (L) 26.5 - 33.0 pg    MCHC 30.7 (L) 31.5 - 36.5 g/dL    RDW 18.6 (H) 10.0 - 15.0 %    Platelet Count 617 (H) 150 - 450 10e3/uL   Extra Green Top (Lithium Heparin) Tube    Collection Time: 03/27/25  8:27 AM   Result Value Ref Range    Hold Specimen JI    Enteric Bacteria and Virus Panel PCR    Collection Time: 03/27/25  9:35 AM    Specimen: Per Rectum; Stool   Result Value  Ref Range    Campylobacter species Negative Negative    Salmonella species Negative Negative    Vibrio species Negative Negative    Vibrio cholerae Negative Negative    Yersinia enterocolitica Negative Negative    Enteropathogenic E. coli (EPEC) Negative Negative, NA    Shiga-like toxin-producing E. coli (STEC) Negative Negative    Shigella/Enteroinvasive E. coli (EIEC) Negative Negative    Cryptosporidium species Negative Negative    Giardia lamblia Negative Negative    Norovirus Gl/Gll Negative Negative    Rotavirus A Negative Negative    Plesiomonas shigelloides Negative Negative    Enteroaggregative E. coli (EAEC) Negative Negative    Enterotoxigenic E. coli (ETEC) Negative Negative    E. coli O157 NA Negative, NA    Cyclospora cayetanensis Negative Negative    Entamoeba histolytica Negative Negative    Adenovirus F40/41 Negative Negative    Astrovirus Negative Negative    Sapovirus Negative Negative   C. difficile Toxin B PCR with reflex to C. difficile EIA    Collection Time: 03/27/25  9:35 AM    Specimen: Per Rectum; Stool   Result Value Ref Range    C Difficile Toxin B by PCR Negative Negative   CRP inflammation    Collection Time: 03/28/25  7:33 AM   Result Value Ref Range    CRP Inflammation 164.70 (H) <5.00 mg/L   Basic metabolic panel    Collection Time: 03/28/25  7:33 AM   Result Value Ref Range    Sodium 138 135 - 145 mmol/L    Potassium 4.5 3.4 - 5.3 mmol/L    Chloride 98 98 - 107 mmol/L    Carbon Dioxide (CO2) 33 (H) 22 - 29 mmol/L    Anion Gap 7 7 - 15 mmol/L    Urea Nitrogen 13.3 8.0 - 23.0 mg/dL    Creatinine 1.07 (H) 0.51 - 0.95 mg/dL    GFR Estimate 53 (L) >60 mL/min/1.73m2    Calcium 8.5 (L) 8.8 - 10.4 mg/dL    Glucose 95 70 - 99 mg/dL   CBC with platelets    Collection Time: 03/28/25  7:33 AM   Result Value Ref Range    WBC Count 6.5 4.0 - 11.0 10e3/uL    RBC Count 3.06 (L) 3.80 - 5.20 10e6/uL    Hemoglobin 7.7 (L) 11.7 - 15.7 g/dL    Hematocrit 25.7 (L) 35.0 - 47.0 %    MCV 84 78 - 100 fL     MCH 25.2 (L) 26.5 - 33.0 pg    MCHC 30.0 (L) 31.5 - 36.5 g/dL    RDW 18.5 (H) 10.0 - 15.0 %    Platelet Count 587 (H) 150 - 450 10e3/uL   Glucose by meter    Collection Time: 03/29/25  7:49 AM   Result Value Ref Range    GLUCOSE BY METER POCT 89 70 - 99 mg/dL   Hemoglobin    Collection Time: 03/29/25  7:51 AM   Result Value Ref Range    Hemoglobin 8.3 (L) 11.7 - 15.7 g/dL   Extra Green Top Tube (LAB USE ONLY)    Collection Time: 03/29/25  7:51 AM   Result Value Ref Range    Hold Specimen JIC    Hemoglobin    Collection Time: 03/31/25  8:09 AM   Result Value Ref Range    Hemoglobin 9.1 (L) 11.7 - 15.7 g/dL        MEDICATIONS:  MED REC REQUIRED  Post Medication Reconciliation Status: discharge medications reconciled, continue medications without change          Review of your medicines            Accurate as of March 30, 2025 12:00 PM. If you have any questions, ask your nurse or doctor.                CONTINUE these medicines which have NOT CHANGED        Dose / Directions   acetaminophen 500 MG tablet  Commonly known as: TYLENOL      Dose: 1,000 mg  Take 1,000 mg by mouth daily as needed for pain.  Refills: 0     ascorbic acid 250 MG Chew chewable tablet  Commonly known as: vitamin C      Dose: 250 mg  Take 250 mg by mouth daily.  Refills: 0     buPROPion 150 MG 12 hr tablet  Commonly known as: WELLBUTRIN SR      Dose: 150 mg  Take 150 mg by mouth every morning  Refills: 0     Carboxymethylcellulose Sodium 0.25 % Soln      Dose: 1 drop  Apply 1 drop to eye daily as needed  Refills: 0     cephALEXin 500 MG capsule  Commonly known as: KEFLEX  Indication: Infection of the Skin and/or Soft Tissue  Used for: Cellulitis of right lower leg [L03.115]      Dose: 1,000 mg  Take 2 capsules (1,000 mg) by mouth every 8 hours for 3 days.  Refills: 0     citalopram 40 MG tablet  Commonly known as: celeXA      Dose: 40 mg  Take 40 mg by mouth daily (with lunch)  Refills: 0     ferrous sulfate 325 (65 Fe) MG tablet  Commonly known  as: FEROSUL      Dose: 325 mg  Take 325 mg by mouth every other day.  Refills: 0     furosemide 20 MG tablet  Commonly known as: LASIX  Used for: Cellulitis of right lower leg [L03.115]      Dose: 60 mg  Take 3 tablets (60 mg) by mouth daily.  Quantity: 30 tablet  Refills: 1     gabapentin 100 MG capsule  Commonly known as: NEURONTIN      Dose: 100 mg  Take 100 mg by mouth 3 times daily  Refills: 0     HYDROmorphone 2 MG tablet  Commonly known as: DILAUDID  Used for: Cellulitis of left lower extremity [L03.116], Cellulitis of right lower leg [L03.115]      Dose: 1-2 mg  Take 0.5-1 tablets (1-2 mg) by mouth every 6 hours as needed for moderate to severe pain.  Quantity: 15 tablet  Refills: 0     lactobacillus rhamnosus (GG) capsule  Used for: Cellulitis of left lower extremity [L03.116], Cellulitis of right lower leg [L03.115]      Dose: 1 capsule  Take 1 capsule by mouth 2 times daily.  Refills: 0     magnesium oxide 400 MG tablet  Commonly known as: MAG-OX      Dose: 1 tablet  Take 1 tablet by mouth every morning  Refills: 0     * minocycline 100 MG capsule  Commonly known as: MINOCIN  Indication: Infection of the Skin and/or Soft Tissue  Used for: Cellulitis of right lower leg [L03.115]      Dose: 100 mg  Take 1 capsule (100 mg) by mouth every 12 hours for 3 days.  Refills: 0     * minocycline 50 MG tablet  Commonly known as: DYNACIN  Used for: Cellulitis of right lower leg [L03.115]      Dose: 50 mg  Start taking on: April 2, 2025  Take 1 tablet (50 mg) by mouth daily.  Refills: 0     multivitamin w/minerals tablet      Dose: 1 tablet  Take 1 tablet by mouth every morning  Refills: 0     nitroGLYcerin 0.4 MG sublingual tablet  Commonly known as: NITROSTAT      PLACE 1 TABLET UNDER TONGUE EVERY 5 MINTUES AS NEEDED FOR CHEST PAIN FOR UP TO 30 DAYS  Refills: 0     nystatin 327209 UNIT/GM external powder  Commonly known as: MYCOSTATIN      Apply topically 2 times daily as needed.  Refills: 0     pantoprazole 40 MG EC  tablet  Commonly known as: PROTONIX  Used for: Cellulitis of right lower leg [L03.115]      Dose: 40 mg  Take 1 tablet (40 mg) by mouth 2 times daily.  Quantity: 60 tablet  Refills: 1     potassium chloride ER 20 MEQ CR tablet  Commonly known as: K-TAB      Dose: 20 mEq  Take 20 mEq by mouth 2 times daily.  Refills: 0     rOPINIRole 1 MG tablet  Commonly known as: REQUIP      Dose: 0.5 mg  Take 0.5 mg by mouth 2 times daily. Take one-half tablet (dose = 0.5 mg) twice daily. Once in the morning and once about dinnertime.     In addition, take one tablet (1 mg) at bedtime.  Refills: 0     simvastatin 40 MG tablet  Commonly known as: ZOCOR      Dose: 40 mg  [SIMVASTATIN (ZOCOR) 40 MG TABLET] Take 40 mg by mouth bedtime.  Refills: 0     spironolactone 25 MG tablet  Commonly known as: ALDACTONE      Dose: 25 mg  Take 25 mg by mouth every morning  Refills: 0     VASHE WOUND THERAPY EX      Externally apply topically daily as needed  Refills: 0     vitamin B-Complex      Dose: 1 tablet  Take 1 tablet by mouth daily.  Refills: 0     vitamin D3 250 mcg (40156 units) capsule  Commonly known as: CHOLECALCIFEROL      Dose: 1 capsule  Take 1 capsule by mouth daily.  Refills: 0     zinc 50 MG Tabs      Dose: 1 tablet  Take 1 tablet by mouth three times a week. On Tues, Thur, Sat  Refills: 0           * This list has 2 medication(s) that are the same as other medications prescribed for you. Read the directions carefully, and ask your doctor or other care provider to review them with you.                  ASSESSMENT/PLAN  Encounter Diagnoses   Name Primary?     Physical deconditioning Yes     Chronic pain syndrome      Cellulitis of right lower leg      Cellulitis of left lower extremity      Physical deconditioning PT OT    Pain management as needed Tylenol daily, Dilaudid 1 to 2 mg every 6 hours as needed    GERD on Protonix    Cellulitis bilateral lower extremities dressing changes as ordered, will complete minocycline and  Keflex on 4/1/2025 and then be on a lower dose minocycline indefinitely    Restless leg syndrome continue ropinirole    Mood disorder on bupropion, Celexa 40 mg daily    Anemia on ferrous sulfate daily monitor labs hemoglobin on 3/31/2025 was 9.1 up from 8.3    Edema continue Lasix 20 mg daily    Neuropathy continue gabapentin    CHF Daily weights, continue Lasix spironolactone    Electronically signed by: Rosario Styles CNP       Sincerely,        Rosario Styles CNP    Electronically signed

## 2025-04-02 ENCOUNTER — TRANSITIONAL CARE UNIT VISIT (OUTPATIENT)
Dept: GERIATRICS | Facility: CLINIC | Age: 80
End: 2025-04-02
Payer: COMMERCIAL

## 2025-04-02 VITALS
TEMPERATURE: 97.3 F | DIASTOLIC BLOOD PRESSURE: 65 MMHG | RESPIRATION RATE: 18 BRPM | HEART RATE: 76 BPM | BODY MASS INDEX: 47.32 KG/M2 | HEIGHT: 60 IN | SYSTOLIC BLOOD PRESSURE: 134 MMHG | WEIGHT: 241 LBS | OXYGEN SATURATION: 90 %

## 2025-04-02 DIAGNOSIS — R53.81 PHYSICAL DECONDITIONING: Primary | ICD-10-CM

## 2025-04-02 DIAGNOSIS — R25.3 MUSCLE TWITCHING: ICD-10-CM

## 2025-04-02 PROCEDURE — 99309 SBSQ NF CARE MODERATE MDM 30: CPT | Performed by: NURSE PRACTITIONER

## 2025-04-02 NOTE — PROGRESS NOTES
[facility] GERIATRIC SERVICES  Chief Complaint   Patient presents with    JORGE     Donora Medical Record Number:  [id number]  Place of Service where encounter took place:  [facility] ([facility]) [[id number]]  Code Status:  Full Code     HISTORY:      HPI:  Elizabeth Kelley  is 79 year old (1945) undergoing physical and occupational therapy. She is with has PMHx of recurrent right lower extremity cellulitis, chronic lower extremity lymphedema, chronic venous stasis, chronic anemia, RLS, and HLD. She has a history of recurrent hospitalizations for lower extremity cellulitis.  Excerpted from records Patient recently was admitted 1/12 - 1/18/2025 for RIGHT lower extremity cellulitis and sepsis, treated with meropenem and vancomycin, and she was discharged on doxycycline.  She was again hospitalized 3/12 - 3/25/25 for LEFT lower extremity cellulitis. She was treated with Vanco and Zosyn/ceftriaxone and discharged with Keflex. She was recommended to be discharged to TCU, but she declined. She returned to the ER on 3/26/25 for inability to care for self at home and new onset diarrhea.     Today she is seen to review vital signs, and for reports of intermittent twitches.  She reports the twitches started in the hospital.  She will have a random twitch her shoulder, arm and leg and reports they  last only approximately a second.  She can go from no tics in a day to up to 10 per her report.  She reports it is not affecting her sleep and thinks it may be related to stress.  She does have a history of MS not on medications.  Writer informed her that she should follow-up with her neurologist and let him be aware of these twitches she is having.  She denied chest pain shortness of breath cough congestion constipation or diarrhea.  She is with bilateral lower extremity cellulitis.  Right leg significantly larger than the left and she tells writer she has had ultrasounds done to rule out DVTs.  She also recently reported  this is the first time having the cellulitis of the left leg it is normally just the right only.  She will complete Keflex and minocycline on 4/1/2025 and then will be on a lower dose of the minocycline indefinitely.  Wound care team to monitor lower extremity wounds.  Hemoglobin  9.1 up from 8.3 she did receive blood transfusions in the hospital.  Per her report she lives with her significant other.  She does have restless leg syndrome and on medications and did not feel she needed this increased.    ALLERGIES:Other environmental allergy, Adhesive tape, Adhesive [cyanoacrylate], Latex, Ragweeds, Oxycodone-acetaminophen, and Vicodin [hydrocodone-acetaminophen]    PAST MEDICAL HISTORY:   Past Medical History:   Diagnosis Date    Ankle contracture, left     Class 3 severe obesity due to excess calories without serious comorbidity with body mass index (BMI) of 45.0 to 49.9 in adult (H)     Combined gastric and duodenal ulcer     Controlled substance agreement broken     Depression     Dyslipidemia     Edema     Edema     Gait abnormality     GERD (gastroesophageal reflux disease)     Gout     Lymphedema of both lower extremities     Melanoma (H)     left upper arm    MS (multiple sclerosis) (H)     RLS (restless legs syndrome)     Skin ulcer of left lower leg (H)     Valgus deformity of both feet     Vitamin D deficiency        PAST SURGICAL HISTORY:   has a past surgical history that includes Mammoplasty reduction (Bilateral); Cosmetic surgery (1988); Esophagoscopy, gastroscopy, duodenoscopy (EGD), combined (N/A, 03/30/2015); Spine surgery; Ablation Saphenous Vein W/ Rfa (Right, 04/12/2021); Mohs micrographic procedure; PICC/Midline Placement (8/13/2021); PICC/Midline Placement (9/2/2021); Irrigation and debridement lower extremity, combined (Right, 3/21/2022); Irrigation and debridement lower extremity, combined (Right, 3/28/2022); PICC/Midline Placement (7/21/2024); PICC/Midline Placement (9/8/2024); PICC/Midline  Placement (1/14/2025); PICC/Midline Placement (3/20/2025); and Esophagoscopy, gastroscopy, duodenoscopy (EGD), combined (N/A, 3/21/2025).    FAMILY HISTORY: family history includes Arthritis in her maternal grandmother, maternal uncle, paternal grandfather, paternal grandmother, and sister; Asthma in her sister; Brain Cancer in her father; Breast Cancer in her paternal aunt; Colon Cancer in her paternal aunt; Early Death in her maternal grandfather; Hyperlipidemia in her mother, paternal aunt, and sister; Hypertension in her mother, paternal aunt, and sister; Multiple Sclerosis in her mother and sister; Obesity in her sister; Uterine Cancer in her mother.    SOCIAL HISTORY:  reports that she quit smoking about 49 years ago. Her smoking use included cigarettes. She started smoking about 61 years ago. She has a 3 pack-year smoking history. She has never used smokeless tobacco. She reports current alcohol use of about 1.0 standard drink of alcohol per week. She reports that she does not use drugs.    ROS:  Constitutional: Negative for activity change, appetite change, fatigue and fever.   HENT: Negative for congestion.    Respiratory: Negative for cough, shortness of breath and wheezing.    Cardiovascular: Negative for chest pain and positive leg swelling.   Gastrointestinal: Negative for abdominal distention, abdominal pain, constipation, diarrhea and nausea.   Genitourinary: Negative for dysuria.   Musculoskeletal: Negative for arthralgia. Negative for back pain.   Skin: Negative for color change and wound.  Bilateral lower extremity wounds dressing changes in place wound care team to follow  Neurological: Negative for dizziness.   Psychiatric/Behavioral: Negative for agitation, behavioral problems and confusion.     Physical Exam:  Constitutional:       Appearance: Patient is well-developed.   HENT:      Head: Normocephalic.   Eyes:      Conjunctiva/sclera: Conjunctivae normal.   Neck:      Musculoskeletal: Normal  range of motion.   Cardiovascular:      Rate and Rhythm: Normal rate and regular rhythm.      Heart sounds: Normal heart sounds. No murmur.   Pulmonary:      Effort: No respiratory distress.      Breath sounds: Normal breath sounds. No wheezing or rales.   Abdominal:      General: Bowel sounds are normal. There is no distension.      Palpations: Abdomen is soft.      Tenderness: There is no abdominal tenderness.   Musculoskeletal:       Normal range of motion.  Lower extremity edema right leg greater than the left  Skin:General:        Skin is warm.  Bilateral lower extremity cellulitis  Neurological:         Mental Status: Patient is alert and oriented to person, place, and time.  Having intermittent twitches  Psychiatric:         Behavior: Behavior normal.     Vitals:/65   Pulse 76   Temp 97.3  F (36.3  C)   Resp 18   Ht 1.524 m (5')   Wt 109.3 kg (241 lb)   SpO2 (!) 90%   BMI 47.07 kg/m   and Body mass index is 47.07 kg/m .    Lab/Diagnostic data:   Recent Results (from the past 240 hours)   Platelet count    Collection Time: 03/24/25  6:36 AM   Result Value Ref Range    Platelet Count 573 (H) 150 - 450 10e3/uL   Hemoglobin    Collection Time: 03/24/25  9:24 AM   Result Value Ref Range    Hemoglobin 7.8 (L) 11.7 - 15.7 g/dL   CRP inflammation    Collection Time: 03/25/25  6:36 AM   Result Value Ref Range    CRP Inflammation 150.20 (H) <5.00 mg/L   CBC with platelets and differential    Collection Time: 03/25/25  6:36 AM   Result Value Ref Range    WBC Count 8.3 4.0 - 11.0 10e3/uL    RBC Count 3.37 (L) 3.80 - 5.20 10e6/uL    Hemoglobin 8.3 (L) 11.7 - 15.7 g/dL    Hematocrit 28.1 (L) 35.0 - 47.0 %    MCV 83 78 - 100 fL    MCH 24.6 (L) 26.5 - 33.0 pg    MCHC 29.5 (L) 31.5 - 36.5 g/dL    RDW 18.9 (H) 10.0 - 15.0 %    Platelet Count 639 (H) 150 - 450 10e3/uL    % Neutrophils 65 %    % Lymphocytes 16 %    % Monocytes 15 %    % Eosinophils 2 %    % Basophils 1 %    % Immature Granulocytes 1 %    NRBCs per  100 WBC 0 <1 /100    Absolute Neutrophils 5.4 1.6 - 8.3 10e3/uL    Absolute Lymphocytes 1.3 0.8 - 5.3 10e3/uL    Absolute Monocytes 1.3 0.0 - 1.3 10e3/uL    Absolute Eosinophils 0.2 0.0 - 0.7 10e3/uL    Absolute Basophils 0.1 0.0 - 0.2 10e3/uL    Absolute Immature Granulocytes 0.1 <=0.4 10e3/uL    Absolute NRBCs 0.0 10e3/uL   Basic metabolic panel    Collection Time: 03/26/25  8:54 PM   Result Value Ref Range    Sodium 140 135 - 145 mmol/L    Potassium 4.2 3.4 - 5.3 mmol/L    Chloride 98 98 - 107 mmol/L    Carbon Dioxide (CO2) 34 (H) 22 - 29 mmol/L    Anion Gap 8 7 - 15 mmol/L    Urea Nitrogen 16.1 8.0 - 23.0 mg/dL    Creatinine 0.96 (H) 0.51 - 0.95 mg/dL    GFR Estimate 61 >60 mL/min/1.73m2    Calcium 9.0 8.8 - 10.4 mg/dL    Glucose 91 70 - 99 mg/dL   Lactic acid whole blood with 1x repeat in 2 hr when >2    Collection Time: 03/26/25  8:54 PM   Result Value Ref Range    Lactic Acid, Initial 1.4 0.7 - 2.0 mmol/L   CBC (+ platelets, no diff)    Collection Time: 03/26/25  8:54 PM   Result Value Ref Range    WBC Count 10.4 4.0 - 11.0 10e3/uL    RBC Count 3.46 (L) 3.80 - 5.20 10e6/uL    Hemoglobin 8.8 (L) 11.7 - 15.7 g/dL    Hematocrit 28.6 (L) 35.0 - 47.0 %    MCV 83 78 - 100 fL    MCH 25.4 (L) 26.5 - 33.0 pg    MCHC 30.8 (L) 31.5 - 36.5 g/dL    RDW 18.6 (H) 10.0 - 15.0 %    Platelet Count 698 (H) 150 - 450 10e3/uL   Magnesium    Collection Time: 03/26/25  8:54 PM   Result Value Ref Range    Magnesium 1.9 1.7 - 2.3 mg/dL   CRP inflammation    Collection Time: 03/26/25  8:54 PM   Result Value Ref Range    CRP Inflammation 207.30 (H) <5.00 mg/L   TSH with free T4 reflex    Collection Time: 03/26/25  8:54 PM   Result Value Ref Range    TSH 5.42 (H) 0.30 - 4.20 uIU/mL   T4 free    Collection Time: 03/26/25  8:54 PM   Result Value Ref Range    Free T4 1.33 0.90 - 1.70 ng/dL   UA with Microscopic reflex to Culture    Collection Time: 03/27/25  6:24 AM    Specimen: Urine, Catheter   Result Value Ref Range    Color Urine  Yellow Colorless, Straw, Light Yellow, Yellow    Appearance Urine Clear Clear    Glucose Urine Negative Negative mg/dL    Bilirubin Urine Negative Negative    Ketones Urine Trace (A) Negative mg/dL    Specific Gravity Urine 1.034 (H) 1.001 - 1.030    Blood Urine Negative Negative    pH Urine 6.5 5.0 - 7.0    Protein Albumin Urine 50 (A) Negative mg/dL    Urobilinogen Urine 4.0 (A) Normal mg/dL    Nitrite Urine Negative Negative    Leukocyte Esterase Urine Negative Negative    RBC Urine 1 <=2 /HPF    WBC Urine 3 <=5 /HPF    Squamous Epithelials Urine 4 (H) <=1 /HPF   CBC with platelets    Collection Time: 03/27/25  8:27 AM   Result Value Ref Range    WBC Count 9.0 4.0 - 11.0 10e3/uL    RBC Count 3.06 (L) 3.80 - 5.20 10e6/uL    Hemoglobin 7.8 (L) 11.7 - 15.7 g/dL    Hematocrit 25.4 (L) 35.0 - 47.0 %    MCV 83 78 - 100 fL    MCH 25.5 (L) 26.5 - 33.0 pg    MCHC 30.7 (L) 31.5 - 36.5 g/dL    RDW 18.6 (H) 10.0 - 15.0 %    Platelet Count 617 (H) 150 - 450 10e3/uL   Extra Green Top (Lithium Heparin) Tube    Collection Time: 03/27/25  8:27 AM   Result Value Ref Range    Hold Specimen [facility]    Enteric Bacteria and Virus Panel PCR    Collection Time: 03/27/25  9:35 AM    Specimen: Per Rectum; Stool   Result Value Ref Range    Campylobacter species Negative Negative    Salmonella species Negative Negative    Vibrio species Negative Negative    Vibrio cholerae Negative Negative    Yersinia enterocolitica Negative Negative    Enteropathogenic E. coli (EPEC) Negative Negative, NA    Shiga-like toxin-producing E. coli (STEC) Negative Negative    Shigella/Enteroinvasive E. coli (EIEC) Negative Negative    Cryptosporidium species Negative Negative    Giardia lamblia Negative Negative    Norovirus Gl/Gll Negative Negative    Rotavirus A Negative Negative    Plesiomonas shigelloides Negative Negative    Enteroaggregative E. coli (EAEC) Negative Negative    Enterotoxigenic E. coli (ETEC) Negative Negative    E. coli O157 NA Negative, NA     Cyclospora cayetanensis Negative Negative    Entamoeba histolytica Negative Negative    Adenovirus F40/41 Negative Negative    Astrovirus Negative Negative    Sapovirus Negative Negative   C. difficile Toxin B PCR with reflex to C. difficile EIA    Collection Time: 03/27/25  9:35 AM    Specimen: Per Rectum; Stool   Result Value Ref Range    C Difficile Toxin B by PCR Negative Negative   CRP inflammation    Collection Time: 03/28/25  7:33 AM   Result Value Ref Range    CRP Inflammation 164.70 (H) <5.00 mg/L   Basic metabolic panel    Collection Time: 03/28/25  7:33 AM   Result Value Ref Range    Sodium 138 135 - 145 mmol/L    Potassium 4.5 3.4 - 5.3 mmol/L    Chloride 98 98 - 107 mmol/L    Carbon Dioxide (CO2) 33 (H) 22 - 29 mmol/L    Anion Gap 7 7 - 15 mmol/L    Urea Nitrogen 13.3 8.0 - 23.0 mg/dL    Creatinine 1.07 (H) 0.51 - 0.95 mg/dL    GFR Estimate 53 (L) >60 mL/min/1.73m2    Calcium 8.5 (L) 8.8 - 10.4 mg/dL    Glucose 95 70 - 99 mg/dL   CBC with platelets    Collection Time: 03/28/25  7:33 AM   Result Value Ref Range    WBC Count 6.5 4.0 - 11.0 10e3/uL    RBC Count 3.06 (L) 3.80 - 5.20 10e6/uL    Hemoglobin 7.7 (L) 11.7 - 15.7 g/dL    Hematocrit 25.7 (L) 35.0 - 47.0 %    MCV 84 78 - 100 fL    MCH 25.2 (L) 26.5 - 33.0 pg    MCHC 30.0 (L) 31.5 - 36.5 g/dL    RDW 18.5 (H) 10.0 - 15.0 %    Platelet Count 587 (H) 150 - 450 10e3/uL   Glucose by meter    Collection Time: 03/29/25  7:49 AM   Result Value Ref Range    GLUCOSE BY METER POCT 89 70 - 99 mg/dL   Hemoglobin    Collection Time: 03/29/25  7:51 AM   Result Value Ref Range    Hemoglobin 8.3 (L) 11.7 - 15.7 g/dL   Extra Green Top Tube (LAB USE ONLY)    Collection Time: 03/29/25  7:51 AM   Result Value Ref Range    Hold Specimen [facility]    Hemoglobin    Collection Time: 03/31/25  8:09 AM   Result Value Ref Range    Hemoglobin 9.1 (L) 11.7 - 15.7 g/dL        MEDICATIONS:  MED REC REQUIRED  Post Medication Reconciliation Status: discharge medications reconciled,  continue medications without change          Review of your medicines            Accurate as of April 2, 2025 11:56 AM. If you have any questions, ask your nurse or doctor.                CONTINUE these medicines which have NOT CHANGED        Dose / Directions   acetaminophen 500 MG tablet  Commonly known as: TYLENOL      Dose: 1,000 mg  Take 1,000 mg by mouth daily as needed for pain.  Refills: 0     ascorbic acid 250 MG Chew chewable tablet  Commonly known as: vitamin C      Dose: 250 mg  Take 250 mg by mouth daily.  Refills: 0     buPROPion 150 MG 12 hr tablet  Commonly known as: WELLBUTRIN SR      Dose: 150 mg  Take 150 mg by mouth every morning  Refills: 0     Carboxymethylcellulose Sodium 0.25 % Soln      Dose: 1 drop  Apply 1 drop to eye daily as needed  Refills: 0     citalopram 40 MG tablet  Commonly known as: celeXA      Dose: 40 mg  Take 40 mg by mouth daily (with lunch)  Refills: 0     ferrous sulfate 325 (65 Fe) MG tablet  Commonly known as: FEROSUL      Dose: 325 mg  Take 325 mg by mouth every other day.  Refills: 0     furosemide 20 MG tablet  Commonly known as: LASIX  Used for: Cellulitis of right lower leg [L03.115]      Dose: 60 mg  Take 3 tablets (60 mg) by mouth daily.  Quantity: 30 tablet  Refills: 1     gabapentin 100 MG capsule  Commonly known as: NEURONTIN      Dose: 100 mg  Take 100 mg by mouth 3 times daily  Refills: 0     HYDROmorphone 2 MG tablet  Commonly known as: DILAUDID  Used for: Cellulitis of left lower extremity [L03.116], Cellulitis of right lower leg [L03.115]      Dose: 1-2 mg  Take 0.5-1 tablets (1-2 mg) by mouth every 6 hours as needed for moderate to severe pain.  Quantity: 15 tablet  Refills: 0     lactobacillus rhamnosus (GG) capsule  Used for: Cellulitis of left lower extremity [L03.116], Cellulitis of right lower leg [L03.115]      Dose: 1 capsule  Take 1 capsule by mouth 2 times daily.  Refills: 0     magnesium oxide 400 MG tablet  Commonly known as: MAG-OX      Dose: 1  tablet  Take 1 tablet by mouth every morning  Refills: 0     minocycline 50 MG tablet  Commonly known as: DYNACIN  Used for: Cellulitis of right lower leg [L03.115]      Dose: 50 mg  Take 1 tablet (50 mg) by mouth daily.  Refills: 0     multivitamin w/minerals tablet      Dose: 1 tablet  Take 1 tablet by mouth every morning  Refills: 0     nitroGLYcerin 0.4 MG sublingual tablet  Commonly known as: NITROSTAT      PLACE 1 TABLET UNDER TONGUE EVERY 5 MINTUES AS NEEDED FOR CHEST PAIN FOR UP TO 30 DAYS  Refills: 0     nystatin 385579 UNIT/GM external powder  Commonly known as: MYCOSTATIN      Apply topically 2 times daily as needed.  Refills: 0     pantoprazole 40 MG EC tablet  Commonly known as: PROTONIX  Used for: Cellulitis of right lower leg [L03.115]      Dose: 40 mg  Take 1 tablet (40 mg) by mouth 2 times daily.  Quantity: 60 tablet  Refills: 1     potassium chloride ER 20 MEQ CR tablet  Commonly known as: K-TAB      Dose: 20 mEq  Take 20 mEq by mouth 2 times daily.  Refills: 0     rOPINIRole 1 MG tablet  Commonly known as: REQUIP      Dose: 0.5 mg  Take 0.5 mg by mouth 2 times daily. Take one-half tablet (dose = 0.5 mg) twice daily. Once in the morning and once about dinnertime.     In addition, take one tablet (1 mg) at bedtime.  Refills: 0     simvastatin 40 MG tablet  Commonly known as: ZOCOR      Dose: 40 mg  [SIMVASTATIN (ZOCOR) 40 MG TABLET] Take 40 mg by mouth bedtime.  Refills: 0     spironolactone 25 MG tablet  Commonly known as: ALDACTONE      Dose: 25 mg  Take 25 mg by mouth every morning  Refills: 0     VASHE WOUND THERAPY EX      Externally apply topically daily as needed  Refills: 0     vitamin B-Complex      Dose: 1 tablet  Take 1 tablet by mouth daily.  Refills: 0     vitamin D3 250 mcg (25972 units) capsule  Commonly known as: CHOLECALCIFEROL      Dose: 1 capsule  Take 1 capsule by mouth daily.  Refills: 0     zinc 50 MG Tabs      Dose: 1 tablet  Take 1 tablet by mouth three times a week. On Tues,  Thur, Sat  Refills: 0              ASSESSMENT/PLAN  Encounter Diagnoses   Name Primary?    Physical deconditioning Yes    Muscle twitching      Twitches intermittent on medications for restless leg syndrome declined an increase in these meds denied the twitches affecting her sleep and recommended she update her neurologist    Physical deconditioning PT OT    Pain management as needed Tylenol daily, Dilaudid 1 to 2 mg every 6 hours as needed    GERD on Protonix    Cellulitis bilateral lower extremities dressing changes as ordered, will complete minocycline and Keflex on 4/1/2025 and then be on a lower dose minocycline indefinitely    Restless leg syndrome continue ropinirole    Mood disorder on bupropion, Celexa 40 mg daily    Anemia on ferrous sulfate daily monitor labs hemoglobin on 3/31/2025 was 9.1 up from 8.3    Edema continue Lasix 20 mg daily    Neuropathy continue gabapentin    CHF Daily weights, continue Lasix spironolactone    Electronically signed by: Rosario Styles CNP

## 2025-04-02 NOTE — LETTER
4/2/2025      Elizabeth Kelley  [address]  [facility] [address]        [facility]  Chief Complaint   [facility]     [facility]     [facility] Record Number:  [id number]  Place of Service where encounter took place:  [facility] ([facility]) [[id number]]  Code Status:  Full Code     HISTORY:      HPI:  Elizabeth Kelley  is 79 year old (1945) undergoing physical and occupational therapy. She is with has PMHx of recurrent right lower extremity cellulitis, chronic lower extremity lymphedema, chronic venous stasis, chronic anemia, RLS, and HLD. She has a history of recurrent hospitalizations for lower extremity cellulitis.  Excerpted from records Patient recently was admitted 1/12 - 1/18/2025 for RIGHT lower extremity cellulitis and sepsis, treated with meropenem and vancomycin, and she was discharged on doxycycline.  She was again hospitalized 3/12 - 3/25/25 for LEFT lower extremity cellulitis. She was treated with Vanco and Zosyn/ceftriaxone and discharged with Keflex. She was recommended to be discharged to TCU, but she declined. She returned to the ER on 3/26/25 for inability to care for self at home and new onset diarrhea.     Today she is seen to review vital signs, and for reports of intermittent twitches.  She reports the twitches started in the hospital.  She will have a random twitch her shoulder, arm and leg and reports they  last only approximately a second.  She can go from no tics in a day to up to 10 per her report.  She reports it is not affecting her sleep and thinks it may be related to stress.  She does have a history of MS not on medications.  Writer informed her that she should follow-up with her neurologist and let him be aware of these twitches she is having.  She denied chest pain shortness of breath cough congestion constipation or diarrhea.  She is with bilateral lower extremity cellulitis.  Right leg significantly larger than the left and  she tells writer she has had ultrasounds done to rule out DVTs.  She also recently reported this is the first time having the cellulitis of the left leg it is normally just the right only.  She will complete Keflex and minocycline on 4/1/2025 and then will be on a lower dose of the minocycline indefinitely.  Wound care team to monitor lower extremity wounds.  Hemoglobin  9.1 up from 8.3 she did receive blood transfusions in the hospital.  Per her report she lives with her significant other.  She does have restless leg syndrome and on medications and did not feel she needed this increased.    ALLERGIES:Other environmental allergy, Adhesive tape, Adhesive [cyanoacrylate], Latex, Ragweeds, Oxycodone-acetaminophen, and Vicodin [hydrocodone-acetaminophen]    PAST MEDICAL HISTORY:   Past Medical History:   Diagnosis Date     Ankle contracture, left      Class 3 severe obesity due to excess calories without serious comorbidity with body mass index (BMI) of 45.0 to 49.9 in adult (H)      Combined gastric and duodenal ulcer      Controlled substance agreement broken      Depression      Dyslipidemia      Edema      Edema      Gait abnormality      GERD (gastroesophageal reflux disease)      Gout      Lymphedema of both lower extremities      Melanoma (H)     left upper arm     MS (multiple sclerosis) (H)      RLS (restless legs syndrome)      Skin ulcer of left lower leg (H)      Valgus deformity of both feet      Vitamin D deficiency        PAST SURGICAL HISTORY:   has a past surgical history that includes Mammoplasty reduction (Bilateral); Cosmetic surgery (1988); Esophagoscopy, gastroscopy, duodenoscopy (EGD), combined (N/A, 03/30/2015); Spine surgery; Ablation Saphenous Vein W/ Rfa (Right, 04/12/2021); Mohs micrographic procedure; PICC/Midline Placement (8/13/2021); PICC/Midline Placement (9/2/2021); Irrigation and debridement lower extremity, combined (Right, 3/21/2022); Irrigation and debridement lower extremity,  combined (Right, 3/28/2022); PICC/Midline Placement (7/21/2024); PICC/Midline Placement (9/8/2024); PICC/Midline Placement (1/14/2025); PICC/Midline Placement (3/20/2025); and Esophagoscopy, gastroscopy, duodenoscopy (EGD), combined (N/A, 3/21/2025).    FAMILY HISTORY: family history includes Arthritis in her maternal grandmother, maternal uncle, paternal grandfather, paternal grandmother, and sister; Asthma in her sister; Brain Cancer in her father; Breast Cancer in her paternal aunt; Colon Cancer in her paternal aunt; Early Death in her maternal grandfather; Hyperlipidemia in her mother, paternal aunt, and sister; Hypertension in her mother, paternal aunt, and sister; Multiple Sclerosis in her mother and sister; Obesity in her sister; Uterine Cancer in her mother.    SOCIAL HISTORY:  reports that she quit smoking about 49 years ago. Her smoking use included cigarettes. She started smoking about 61 years ago. She has a 3 pack-year smoking history. She has never used smokeless tobacco. She reports current alcohol use of about 1.0 standard drink of alcohol per week. She reports that she does not use drugs.    ROS:  Constitutional: Negative for activity change, appetite change, fatigue and fever.   HENT: Negative for congestion.    Respiratory: Negative for cough, shortness of breath and wheezing.    Cardiovascular: Negative for chest pain and positive leg swelling.   Gastrointestinal: Negative for abdominal distention, abdominal pain, constipation, diarrhea and nausea.   Genitourinary: Negative for dysuria.   Musculoskeletal: Negative for arthralgia. Negative for back pain.   Skin: Negative for color change and wound.  Bilateral lower extremity wounds dressing changes in place wound care team to follow  Neurological: Negative for dizziness.   Psychiatric/Behavioral: Negative for agitation, behavioral problems and confusion.     Physical Exam:  Constitutional:       Appearance: Patient is well-developed.   HENT:       Head: Normocephalic.   Eyes:      Conjunctiva/sclera: Conjunctivae normal.   Neck:      Musculoskeletal: Normal range of motion.   Cardiovascular:      Rate and Rhythm: Normal rate and regular rhythm.      Heart sounds: Normal heart sounds. No murmur.   Pulmonary:      Effort: No respiratory distress.      Breath sounds: Normal breath sounds. No wheezing or rales.   Abdominal:      General: Bowel sounds are normal. There is no distension.      Palpations: Abdomen is soft.      Tenderness: There is no abdominal tenderness.   Musculoskeletal:       Normal range of motion.  Lower extremity edema right leg greater than the left  Skin:General:        Skin is warm.  Bilateral lower extremity cellulitis  Neurological:         Mental Status: Patient is alert and oriented to person, place, and time.  Having intermittent twitches  Psychiatric:         Behavior: Behavior normal.     Vitals:/65   Pulse 76   Temp 97.3  F (36.3  C)   Resp 18   Ht 1.524 m (5')   Wt 109.3 kg (241 lb)   SpO2 (!) 90%   BMI 47.07 kg/m   and Body mass index is 47.07 kg/m .    Lab/Diagnostic data:   Recent Results (from the past 240 hours)   Platelet count    Collection Time: 03/24/25  6:36 AM   Result Value Ref Range    Platelet Count 573 (H) 150 - 450 10e3/uL   Hemoglobin    Collection Time: 03/24/25  9:24 AM   Result Value Ref Range    Hemoglobin 7.8 (L) 11.7 - 15.7 g/dL   CRP inflammation    Collection Time: 03/25/25  6:36 AM   Result Value Ref Range    CRP Inflammation 150.20 (H) <5.00 mg/L   CBC with platelets and differential    Collection Time: 03/25/25  6:36 AM   Result Value Ref Range    WBC Count 8.3 4.0 - 11.0 10e3/uL    RBC Count 3.37 (L) 3.80 - 5.20 10e6/uL    Hemoglobin 8.3 (L) 11.7 - 15.7 g/dL    Hematocrit 28.1 (L) 35.0 - 47.0 %    MCV 83 78 - 100 fL    MCH 24.6 (L) 26.5 - 33.0 pg    MCHC 29.5 (L) 31.5 - 36.5 g/dL    RDW 18.9 (H) 10.0 - 15.0 %    Platelet Count 639 (H) 150 - 450 10e3/uL    % Neutrophils 65 %    %  Lymphocytes 16 %    % Monocytes 15 %    % Eosinophils 2 %    % Basophils 1 %    % Immature Granulocytes 1 %    NRBCs per 100 WBC 0 <1 /100    Absolute Neutrophils 5.4 1.6 - 8.3 10e3/uL    Absolute Lymphocytes 1.3 0.8 - 5.3 10e3/uL    Absolute Monocytes 1.3 0.0 - 1.3 10e3/uL    Absolute Eosinophils 0.2 0.0 - 0.7 10e3/uL    Absolute Basophils 0.1 0.0 - 0.2 10e3/uL    Absolute Immature Granulocytes 0.1 <=0.4 10e3/uL    Absolute NRBCs 0.0 10e3/uL   Basic metabolic panel    Collection Time: 03/26/25  8:54 PM   Result Value Ref Range    Sodium 140 135 - 145 mmol/L    Potassium 4.2 3.4 - 5.3 mmol/L    Chloride 98 98 - 107 mmol/L    Carbon Dioxide (CO2) 34 (H) 22 - 29 mmol/L    Anion Gap 8 7 - 15 mmol/L    Urea Nitrogen 16.1 8.0 - 23.0 mg/dL    Creatinine 0.96 (H) 0.51 - 0.95 mg/dL    GFR Estimate 61 >60 mL/min/1.73m2    Calcium 9.0 8.8 - 10.4 mg/dL    Glucose 91 70 - 99 mg/dL   Lactic acid whole blood with 1x repeat in 2 hr when >2    Collection Time: 03/26/25  8:54 PM   Result Value Ref Range    Lactic Acid, Initial 1.4 0.7 - 2.0 mmol/L   CBC (+ platelets, no diff)    Collection Time: 03/26/25  8:54 PM   Result Value Ref Range    WBC Count 10.4 4.0 - 11.0 10e3/uL    RBC Count 3.46 (L) 3.80 - 5.20 10e6/uL    Hemoglobin 8.8 (L) 11.7 - 15.7 g/dL    Hematocrit 28.6 (L) 35.0 - 47.0 %    MCV 83 78 - 100 fL    MCH 25.4 (L) 26.5 - 33.0 pg    MCHC 30.8 (L) 31.5 - 36.5 g/dL    RDW 18.6 (H) 10.0 - 15.0 %    Platelet Count 698 (H) 150 - 450 10e3/uL   Magnesium    Collection Time: 03/26/25  8:54 PM   Result Value Ref Range    Magnesium 1.9 1.7 - 2.3 mg/dL   CRP inflammation    Collection Time: 03/26/25  8:54 PM   Result Value Ref Range    CRP Inflammation 207.30 (H) <5.00 mg/L   TSH with free T4 reflex    Collection Time: 03/26/25  8:54 PM   Result Value Ref Range    TSH 5.42 (H) 0.30 - 4.20 uIU/mL   T4 free    Collection Time: 03/26/25  8:54 PM   Result Value Ref Range    Free T4 1.33 0.90 - 1.70 ng/dL   UA with Microscopic reflex to  Culture    Collection Time: 03/27/25  6:24 AM    Specimen: Urine, Catheter   Result Value Ref Range    Color Urine Yellow Colorless, Straw, Light Yellow, Yellow    Appearance Urine Clear Clear    Glucose Urine Negative Negative mg/dL    Bilirubin Urine Negative Negative    Ketones Urine Trace (A) Negative mg/dL    Specific Gravity Urine 1.034 (H) 1.001 - 1.030    Blood Urine Negative Negative    pH Urine 6.5 5.0 - 7.0    Protein Albumin Urine 50 (A) Negative mg/dL    Urobilinogen Urine 4.0 (A) Normal mg/dL    Nitrite Urine Negative Negative    Leukocyte Esterase Urine Negative Negative    RBC Urine 1 <=2 /HPF    WBC Urine 3 <=5 /HPF    Squamous Epithelials Urine 4 (H) <=1 /HPF   CBC with platelets    Collection Time: 03/27/25  8:27 AM   Result Value Ref Range    WBC Count 9.0 4.0 - 11.0 10e3/uL    RBC Count 3.06 (L) 3.80 - 5.20 10e6/uL    Hemoglobin 7.8 (L) 11.7 - 15.7 g/dL    Hematocrit 25.4 (L) 35.0 - 47.0 %    MCV 83 78 - 100 fL    MCH 25.5 (L) 26.5 - 33.0 pg    MCHC 30.7 (L) 31.5 - 36.5 g/dL    RDW 18.6 (H) 10.0 - 15.0 %    Platelet Count 617 (H) 150 - 450 10e3/uL   Extra Green Top (Lithium Heparin) Tube    Collection Time: 03/27/25  8:27 AM   Result Value Ref Range    Hold Specimen [facility]    Enteric Bacteria and Virus Panel PCR    Collection Time: 03/27/25  9:35 AM    Specimen: Per Rectum; Stool   Result Value Ref Range    Campylobacter species Negative Negative    Salmonella species Negative Negative    Vibrio species Negative Negative    Vibrio cholerae Negative Negative    Yersinia enterocolitica Negative Negative    Enteropathogenic E. coli (EPEC) Negative Negative, NA    Shiga-like toxin-producing E. coli (STEC) Negative Negative    Shigella/Enteroinvasive E. coli (EIEC) Negative Negative    Cryptosporidium species Negative Negative    Giardia lamblia Negative Negative    Norovirus Gl/Gll Negative Negative    Rotavirus A Negative Negative    Plesiomonas shigelloides Negative Negative    Enteroaggregative E.  coli (EAEC) Negative Negative    Enterotoxigenic E. coli (ETEC) Negative Negative    E. coli O157 NA Negative, NA    Cyclospora cayetanensis Negative Negative    Entamoeba histolytica Negative Negative    Adenovirus F40/41 Negative Negative    Astrovirus Negative Negative    Sapovirus Negative Negative   C. difficile Toxin B PCR with reflex to C. difficile EIA    Collection Time: 03/27/25  9:35 AM    Specimen: Per Rectum; Stool   Result Value Ref Range    C Difficile Toxin B by PCR Negative Negative   CRP inflammation    Collection Time: 03/28/25  7:33 AM   Result Value Ref Range    CRP Inflammation 164.70 (H) <5.00 mg/L   Basic metabolic panel    Collection Time: 03/28/25  7:33 AM   Result Value Ref Range    Sodium 138 135 - 145 mmol/L    Potassium 4.5 3.4 - 5.3 mmol/L    Chloride 98 98 - 107 mmol/L    Carbon Dioxide (CO2) 33 (H) 22 - 29 mmol/L    Anion Gap 7 7 - 15 mmol/L    Urea Nitrogen 13.3 8.0 - 23.0 mg/dL    Creatinine 1.07 (H) 0.51 - 0.95 mg/dL    GFR Estimate 53 (L) >60 mL/min/1.73m2    Calcium 8.5 (L) 8.8 - 10.4 mg/dL    Glucose 95 70 - 99 mg/dL   CBC with platelets    Collection Time: 03/28/25  7:33 AM   Result Value Ref Range    WBC Count 6.5 4.0 - 11.0 10e3/uL    RBC Count 3.06 (L) 3.80 - 5.20 10e6/uL    Hemoglobin 7.7 (L) 11.7 - 15.7 g/dL    Hematocrit 25.7 (L) 35.0 - 47.0 %    MCV 84 78 - 100 fL    MCH 25.2 (L) 26.5 - 33.0 pg    MCHC 30.0 (L) 31.5 - 36.5 g/dL    RDW 18.5 (H) 10.0 - 15.0 %    Platelet Count 587 (H) 150 - 450 10e3/uL   Glucose by meter    Collection Time: 03/29/25  7:49 AM   Result Value Ref Range    GLUCOSE BY METER POCT 89 70 - 99 mg/dL   Hemoglobin    Collection Time: 03/29/25  7:51 AM   Result Value Ref Range    Hemoglobin 8.3 (L) 11.7 - 15.7 g/dL   Extra Green Top Tube (LAB USE ONLY)    Collection Time: 03/29/25  7:51 AM   Result Value Ref Range    Hold Specimen [facility]    Hemoglobin    Collection Time: 03/31/25  8:09 AM   Result Value Ref Range    Hemoglobin 9.1 (L) 11.7 - 15.7 g/dL         MEDICATIONS:  MED REC REQUIRED  Post Medication Reconciliation Status: discharge medications reconciled, continue medications without change          Review of your medicines            Accurate as of April 2, 2025 11:56 AM. If you have any questions, ask your nurse or doctor.                CONTINUE these medicines which have NOT CHANGED        Dose / Directions   acetaminophen 500 MG tablet  Commonly known as: TYLENOL      Dose: 1,000 mg  Take 1,000 mg by mouth daily as needed for pain.  Refills: 0     ascorbic acid 250 MG Chew chewable tablet  Commonly known as: vitamin C      Dose: 250 mg  Take 250 mg by mouth daily.  Refills: 0     buPROPion 150 MG 12 hr tablet  Commonly known as: WELLBUTRIN SR      Dose: 150 mg  Take 150 mg by mouth every morning  Refills: 0     Carboxymethylcellulose Sodium 0.25 % Soln      Dose: 1 drop  Apply 1 drop to eye daily as needed  Refills: 0     citalopram 40 MG tablet  Commonly known as: celeXA      Dose: 40 mg  Take 40 mg by mouth daily (with lunch)  Refills: 0     ferrous sulfate 325 (65 Fe) MG tablet  Commonly known as: FEROSUL      Dose: 325 mg  Take 325 mg by mouth every other day.  Refills: 0     furosemide 20 MG tablet  Commonly known as: LASIX  Used for: Cellulitis of right lower leg [L03.115]      Dose: 60 mg  Take 3 tablets (60 mg) by mouth daily.  Quantity: 30 tablet  Refills: 1     gabapentin 100 MG capsule  Commonly known as: NEURONTIN      Dose: 100 mg  Take 100 mg by mouth 3 times daily  Refills: 0     HYDROmorphone 2 MG tablet  Commonly known as: DILAUDID  Used for: Cellulitis of left lower extremity [L03.116], Cellulitis of right lower leg [L03.115]      Dose: 1-2 mg  Take 0.5-1 tablets (1-2 mg) by mouth every 6 hours as needed for moderate to severe pain.  Quantity: 15 tablet  Refills: 0     lactobacillus rhamnosus (GG) capsule  Used for: Cellulitis of left lower extremity [L03.116], Cellulitis of right lower leg [L03.115]      Dose: 1 capsule  Take 1  capsule by mouth 2 times daily.  Refills: 0     magnesium oxide 400 MG tablet  Commonly known as: MAG-OX      Dose: 1 tablet  Take 1 tablet by mouth every morning  Refills: 0     minocycline 50 MG tablet  Commonly known as: DYNACIN  Used for: Cellulitis of right lower leg [L03.115]      Dose: 50 mg  Take 1 tablet (50 mg) by mouth daily.  Refills: 0     multivitamin w/minerals tablet      Dose: 1 tablet  Take 1 tablet by mouth every morning  Refills: 0     nitroGLYcerin 0.4 MG sublingual tablet  Commonly known as: NITROSTAT      PLACE 1 TABLET UNDER TONGUE EVERY 5 MINTUES AS NEEDED FOR CHEST PAIN FOR UP TO 30 DAYS  Refills: 0     nystatin 580210 UNIT/GM external powder  Commonly known as: MYCOSTATIN      Apply topically 2 times daily as needed.  Refills: 0     pantoprazole 40 MG EC tablet  Commonly known as: PROTONIX  Used for: Cellulitis of right lower leg [L03.115]      Dose: 40 mg  Take 1 tablet (40 mg) by mouth 2 times daily.  Quantity: 60 tablet  Refills: 1     potassium chloride ER 20 MEQ CR tablet  Commonly known as: K-TAB      Dose: 20 mEq  Take 20 mEq by mouth 2 times daily.  Refills: 0     rOPINIRole 1 MG tablet  Commonly known as: REQUIP      Dose: 0.5 mg  Take 0.5 mg by mouth 2 times daily. Take one-half tablet (dose = 0.5 mg) twice daily. Once in the morning and once about dinnertime.     In addition, take one tablet (1 mg) at bedtime.  Refills: 0     simvastatin 40 MG tablet  Commonly known as: ZOCOR      Dose: 40 mg  [SIMVASTATIN (ZOCOR) 40 MG TABLET] Take 40 mg by mouth bedtime.  Refills: 0     spironolactone 25 MG tablet  Commonly known as: ALDACTONE      Dose: 25 mg  Take 25 mg by mouth every morning  Refills: 0     VASHE WOUND THERAPY EX      Externally apply topically daily as needed  Refills: 0     vitamin B-Complex      Dose: 1 tablet  Take 1 tablet by mouth daily.  Refills: 0     vitamin D3 250 mcg (04517 units) capsule  Commonly known as: CHOLECALCIFEROL      Dose: 1 capsule  Take 1 capsule by  mouth daily.  Refills: 0     zinc 50 MG Tabs      Dose: 1 tablet  Take 1 tablet by mouth three times a week. On Tues, Thur, Sat  Refills: 0              ASSESSMENT/PLAN  Encounter Diagnoses   Name Primary?     Physical deconditioning Yes     Muscle twitching      Twitches intermittent on medications for restless leg syndrome declined an increase in these meds denied the twitches affecting her sleep and recommended she update her neurologist    Physical deconditioning PT OT    Pain management as needed Tylenol daily, Dilaudid 1 to 2 mg every 6 hours as needed    GERD on Protonix    Cellulitis bilateral lower extremities dressing changes as ordered, will complete minocycline and Keflex on 4/1/2025 and then be on a lower dose minocycline indefinitely    Restless leg syndrome continue ropinirole    Mood disorder on bupropion, Celexa 40 mg daily    Anemia on ferrous sulfate daily monitor labs hemoglobin on 3/31/2025 was 9.1 up from 8.3    Edema continue Lasix 20 mg daily    Neuropathy continue gabapentin    CHF Daily weights, continue Lasix spironolactone    Electronically signed by: Rosario Styles CNP       Sincerely,        Rosario Styles CNP    Electronically signed

## 2025-04-02 NOTE — PROGRESS NOTES
[facility]       Patient Elizabeth Kelley  MRN: [id number]        Reason for Visit     Chief Complaint   Patient presents with    RECHECK     INITIAL       Code Status     CPR/Full code     Assessment     Left lower extremity cellulitis requiring hospitalization currently on Keflex and minocycline to finish the course of antibiotics  Recent hospitalization for right lower extremity cellulitis  Chronic lymphedema  Venous stasis dermatitis  Chronic wounds lower extremity  Hyperlipidemia  MS  anemia  History of hiatal hernia  Chronic anemia  Thrombocytosis  Depression with anxiety  RLS  Morbid obesity BMI >48  Ckd 3a  GERD  Generalized weakness    Plan     Pt is admitted to TCU for strengthening and rehab.  Has venous stasis with lymphedema and wounds  Has had repeat bouts of hospitalizations related to cellulitis  Now on PO abx and will be on low dose of minocycline with no stop date.  Admits to dry legs with pruritus and advised Aquaphor to legs  Cont leg wraps and wound care  Monitor wts due to morbid obesity;some loss noted ;suspect compliance.  She is on lasix and spironolactone for edema  Has been independent in her house but now cannot walk or stand up on her own  Working with PT.  Has impaired mobility due to MS  Mood stable on multiple meds including Wellbutrin and celexa  Has   Recheck labs-hg 9.1; cont fe  Continue with PT/OT    History     Patient is a very pleasant 79 year old female who is admitted to TCU.  Patient was recently admitted for left lower extremity cellulitis.  She is now on Keflex and minocycline continues to improve   she readmitted with increased weakness and deconditioning  Also was noted to have ongoing diarrhea  Workup was negative for C. Difficile  She has had recurrent hospitalizations and was admitted in January 25 for right lower extremity cellulitis with sepsis  It was felt she is not doing well and TCU recommended and patient now discharged to the TCU    Past  Medical & Surgical History     PAST MEDICAL HISTORY:   Past Medical History:   Diagnosis Date    Ankle contracture, left     Class 3 severe obesity due to excess calories without serious comorbidity with body mass index (BMI) of 45.0 to 49.9 in adult (H)     Combined gastric and duodenal ulcer     Controlled substance agreement broken     Depression     Dyslipidemia     Edema     Edema     Gait abnormality     GERD (gastroesophageal reflux disease)     Gout     Lymphedema of both lower extremities     Melanoma (H)     left upper arm    MS (multiple sclerosis) (H)     RLS (restless legs syndrome)     Skin ulcer of left lower leg (H)     Valgus deformity of both feet     Vitamin D deficiency       PAST SURGICAL HISTORY:   has a past surgical history that includes Mammoplasty reduction (Bilateral); Cosmetic surgery (1988); Esophagoscopy, gastroscopy, duodenoscopy (EGD), combined (N/A, 03/30/2015); Spine surgery; Ablation Saphenous Vein W/ Rfa (Right, 04/12/2021); Mohs micrographic procedure; PICC/Midline Placement (8/13/2021); PICC/Midline Placement (9/2/2021); Irrigation and debridement lower extremity, combined (Right, 3/21/2022); Irrigation and debridement lower extremity, combined (Right, 3/28/2022); PICC/Midline Placement (7/21/2024); PICC/Midline Placement (9/8/2024); PICC/Midline Placement (1/14/2025); PICC/Midline Placement (3/20/2025); and Esophagoscopy, gastroscopy, duodenoscopy (EGD), combined (N/A, 3/21/2025).      Past Social History     Reviewed,  reports that she quit smoking about 49 years ago. Her smoking use included cigarettes. She started smoking about 61 years ago. She has a 3 pack-year smoking history. She has never used smokeless tobacco. She reports current alcohol use of about 1.0 standard drink of alcohol per week. She reports that she does not use drugs.    Family History     Reviewed, and family history includes Arthritis in her maternal grandmother, maternal uncle, paternal grandfather,  paternal grandmother, and sister; Asthma in her sister; Brain Cancer in her father; Breast Cancer in her paternal aunt; Colon Cancer in her paternal aunt; Early Death in her maternal grandfather; Hyperlipidemia in her mother, paternal aunt, and sister; Hypertension in her mother, paternal aunt, and sister; Multiple Sclerosis in her mother and sister; Obesity in her sister; Uterine Cancer in her mother.    Medication List     Current Outpatient Medications   Medication Sig Dispense Refill    acetaminophen (TYLENOL) 500 MG tablet Take 1,000 mg by mouth daily as needed for pain.      ascorbic acid (VITAMIN C) 250 MG CHEW chewable tablet Take 250 mg by mouth daily.      buPROPion (WELLBUTRIN SR) 150 MG 12 hr tablet Take 150 mg by mouth every morning       Carboxymethylcellulose Sodium 0.25 % SOLN Apply 1 drop to eye daily as needed      citalopram (CELEXA) 40 MG tablet Take 40 mg by mouth daily (with lunch)      ferrous sulfate (FEROSUL) 325 (65 Fe) MG tablet Take 325 mg by mouth every other day.      furosemide (LASIX) 20 MG tablet Take 3 tablets (60 mg) by mouth daily. 30 tablet 1    gabapentin (NEURONTIN) 100 MG capsule Take 100 mg by mouth 3 times daily      HYDROmorphone (DILAUDID) 2 MG tablet Take 0.5-1 tablets (1-2 mg) by mouth every 6 hours as needed for moderate to severe pain. 15 tablet 0    lactobacillus rhamnosus, GG, (CULTURELL) capsule Take 1 capsule by mouth 2 times daily.      magnesium oxide (MAG-OX) 400 MG tablet Take 1 tablet by mouth every morning      minocycline (DYNACIN) 50 MG tablet Take 1 tablet (50 mg) by mouth daily.      multivitamin w/minerals (CENTRUM ADULTS) tablet Take 1 tablet by mouth every morning      nitroGLYcerin (NITROSTAT) 0.4 MG sublingual tablet PLACE 1 TABLET UNDER TONGUE EVERY 5 MINTUES AS NEEDED FOR CHEST PAIN FOR UP TO 30 DAYS      nystatin (MYCOSTATIN) 142468 UNIT/GM external powder Apply topically 2 times daily as needed.      pantoprazole (PROTONIX) 40 MG EC tablet Take 1  tablet (40 mg) by mouth 2 times daily. 60 tablet 1    potassium chloride ER (K-TAB) 20 MEQ CR tablet Take 20 mEq by mouth 2 times daily.      rOPINIRole (REQUIP) 1 MG tablet Take 0.5 mg by mouth 2 times daily. Take one-half tablet (dose = 0.5 mg) twice daily. Once in the morning and once about dinnertime.     In addition, take one tablet (1 mg) at bedtime.      simvastatin (ZOCOR) 40 MG tablet [SIMVASTATIN (ZOCOR) 40 MG TABLET] Take 40 mg by mouth bedtime.      spironolactone (ALDACTONE) 25 MG tablet Take 25 mg by mouth every morning      vitamin B-Complex Take 1 tablet by mouth daily.      vitamin D3 (CHOLECALCIFEROL) 250 mcg (40544 units) capsule Take 1 capsule by mouth daily.      Wound Cleansers (VASHE WOUND THERAPY EX) Externally apply topically daily as needed      zinc 50 MG TABS Take 1 tablet by mouth three times a week. On Tues, Thur, Sat       No current facility-administered medications for this visit.      MED REC REQUIRED  Post Medication Reconciliation Status:        Allergies     Allergies   Allergen Reactions    Other Environmental Allergy Anaphylaxis     Bees/Wasps Stings  Bees/Wasps Stings    Adhesive Tape Other (See Comments)     Red,itchy and oozes  Red,itchy and oozes    Adhesive [Cyanoacrylate] Unknown     Other reaction(s): sores    Latex Hives    Ragweeds Itching    Oxycodone-Acetaminophen Rash    Vicodin [Hydrocodone-Acetaminophen] Nausea and Vomiting and Hives       Review of Systems   A comprehensive review of 14 systems was done. Pertinent findings noted here and in history of present illness. All the rest negative.  Constitutional: Negative.  Negative for fever, chills, she has  activity change, appetite change and fatigue.   HENT: Negative for congestion and facial swelling.    Eyes: Negative for photophobia, redness and visual disturbance.   Respiratory: Negative for cough and chest tightness.    Cardiovascular: Negative for chest pain, palpitations and has chronic leg swelling.    Gastrointestinal: Negative for nausea, diarrhea, constipation, blood in stool and abdominal distention.   Genitourinary: Negative.    Musculoskeletal: has knee pain due to walking and PT  Skin: Negative.  Pruritus in her legs   Stasis dermatitis  Neurological: Negative for dizziness, tremors, syncope, weakness, light-headedness and headaches.   Hematological: Does not bruise/bleed easily.   Psychiatric/Behavioral: Negative.        Physical Exam   /60   Pulse 68   Temp 98.1  F (36.7  C)   Resp 18   Ht 1.524 m (5')   Wt 108 kg (238 lb)   SpO2 93%   BMI 46.48 kg/m       GENERAL: no acute distress. Cooperative in conversation.   obese  HEENT: pupils are equal, round and reactive. Oral mucosa is moist and intact.  RESP:Chest symmetric. Regular respiratory rate. No stridor.  CVS s1s2  ABD: Nondistended, soft.  EXTREMITIES: 3-4+ lower extremity edema.   Has stasis dermatitis;OA on left leg  NEURO: non focal. Alert and oriented x3.   PSYCH: within normal limits. No depression or anxiety.  SKIN: warm dry intact           Lab Results     Recent Results (from the past 240 hours)   CRP inflammation    Collection Time: 03/25/25  6:36 AM   Result Value Ref Range    CRP Inflammation 150.20 (H) <5.00 mg/L   CBC with platelets and differential    Collection Time: 03/25/25  6:36 AM   Result Value Ref Range    WBC Count 8.3 4.0 - 11.0 10e3/uL    RBC Count 3.37 (L) 3.80 - 5.20 10e6/uL    Hemoglobin 8.3 (L) 11.7 - 15.7 g/dL    Hematocrit 28.1 (L) 35.0 - 47.0 %    MCV 83 78 - 100 fL    MCH 24.6 (L) 26.5 - 33.0 pg    MCHC 29.5 (L) 31.5 - 36.5 g/dL    RDW 18.9 (H) 10.0 - 15.0 %    Platelet Count 639 (H) 150 - 450 10e3/uL    % Neutrophils 65 %    % Lymphocytes 16 %    % Monocytes 15 %    % Eosinophils 2 %    % Basophils 1 %    % Immature Granulocytes 1 %    NRBCs per 100 WBC 0 <1 /100    Absolute Neutrophils 5.4 1.6 - 8.3 10e3/uL    Absolute Lymphocytes 1.3 0.8 - 5.3 10e3/uL    Absolute Monocytes 1.3 0.0 - 1.3 10e3/uL     Absolute Eosinophils 0.2 0.0 - 0.7 10e3/uL    Absolute Basophils 0.1 0.0 - 0.2 10e3/uL    Absolute Immature Granulocytes 0.1 <=0.4 10e3/uL    Absolute NRBCs 0.0 10e3/uL   Basic metabolic panel    Collection Time: 03/26/25  8:54 PM   Result Value Ref Range    Sodium 140 135 - 145 mmol/L    Potassium 4.2 3.4 - 5.3 mmol/L    Chloride 98 98 - 107 mmol/L    Carbon Dioxide (CO2) 34 (H) 22 - 29 mmol/L    Anion Gap 8 7 - 15 mmol/L    Urea Nitrogen 16.1 8.0 - 23.0 mg/dL    Creatinine 0.96 (H) 0.51 - 0.95 mg/dL    GFR Estimate 61 >60 mL/min/1.73m2    Calcium 9.0 8.8 - 10.4 mg/dL    Glucose 91 70 - 99 mg/dL   Lactic acid whole blood with 1x repeat in 2 hr when >2    Collection Time: 03/26/25  8:54 PM   Result Value Ref Range    Lactic Acid, Initial 1.4 0.7 - 2.0 mmol/L   CBC (+ platelets, no diff)    Collection Time: 03/26/25  8:54 PM   Result Value Ref Range    WBC Count 10.4 4.0 - 11.0 10e3/uL    RBC Count 3.46 (L) 3.80 - 5.20 10e6/uL    Hemoglobin 8.8 (L) 11.7 - 15.7 g/dL    Hematocrit 28.6 (L) 35.0 - 47.0 %    MCV 83 78 - 100 fL    MCH 25.4 (L) 26.5 - 33.0 pg    MCHC 30.8 (L) 31.5 - 36.5 g/dL    RDW 18.6 (H) 10.0 - 15.0 %    Platelet Count 698 (H) 150 - 450 10e3/uL   Magnesium    Collection Time: 03/26/25  8:54 PM   Result Value Ref Range    Magnesium 1.9 1.7 - 2.3 mg/dL   CRP inflammation    Collection Time: 03/26/25  8:54 PM   Result Value Ref Range    CRP Inflammation 207.30 (H) <5.00 mg/L   TSH with free T4 reflex    Collection Time: 03/26/25  8:54 PM   Result Value Ref Range    TSH 5.42 (H) 0.30 - 4.20 uIU/mL   T4 free    Collection Time: 03/26/25  8:54 PM   Result Value Ref Range    Free T4 1.33 0.90 - 1.70 ng/dL   UA with Microscopic reflex to Culture    Collection Time: 03/27/25  6:24 AM    Specimen: Urine, Catheter   Result Value Ref Range    Color Urine Yellow Colorless, Straw, Light Yellow, Yellow    Appearance Urine Clear Clear    Glucose Urine Negative Negative mg/dL    Bilirubin Urine Negative Negative     Ketones Urine Trace (A) Negative mg/dL    Specific Gravity Urine 1.034 (H) 1.001 - 1.030    Blood Urine Negative Negative    pH Urine 6.5 5.0 - 7.0    Protein Albumin Urine 50 (A) Negative mg/dL    Urobilinogen Urine 4.0 (A) Normal mg/dL    Nitrite Urine Negative Negative    Leukocyte Esterase Urine Negative Negative    RBC Urine 1 <=2 /HPF    WBC Urine 3 <=5 /HPF    Squamous Epithelials Urine 4 (H) <=1 /HPF   CBC with platelets    Collection Time: 03/27/25  8:27 AM   Result Value Ref Range    WBC Count 9.0 4.0 - 11.0 10e3/uL    RBC Count 3.06 (L) 3.80 - 5.20 10e6/uL    Hemoglobin 7.8 (L) 11.7 - 15.7 g/dL    Hematocrit 25.4 (L) 35.0 - 47.0 %    MCV 83 78 - 100 fL    MCH 25.5 (L) 26.5 - 33.0 pg    MCHC 30.7 (L) 31.5 - 36.5 g/dL    RDW 18.6 (H) 10.0 - 15.0 %    Platelet Count 617 (H) 150 - 450 10e3/uL   Extra Green Top (Lithium Heparin) Tube    Collection Time: 03/27/25  8:27 AM   Result Value Ref Range    Hold Specimen [facility]    Enteric Bacteria and Virus Panel PCR    Collection Time: 03/27/25  9:35 AM    Specimen: Per Rectum; Stool   Result Value Ref Range    Campylobacter species Negative Negative    Salmonella species Negative Negative    Vibrio species Negative Negative    Vibrio cholerae Negative Negative    Yersinia enterocolitica Negative Negative    Enteropathogenic E. coli (EPEC) Negative Negative, NA    Shiga-like toxin-producing E. coli (STEC) Negative Negative    Shigella/Enteroinvasive E. coli (EIEC) Negative Negative    Cryptosporidium species Negative Negative    Giardia lamblia Negative Negative    Norovirus Gl/Gll Negative Negative    Rotavirus A Negative Negative    Plesiomonas shigelloides Negative Negative    Enteroaggregative E. coli (EAEC) Negative Negative    Enterotoxigenic E. coli (ETEC) Negative Negative    E. coli O157 NA Negative, NA    Cyclospora cayetanensis Negative Negative    Entamoeba histolytica Negative Negative    Adenovirus F40/41 Negative Negative    Astrovirus Negative Negative     Sapovirus Negative Negative   C. difficile Toxin B PCR with reflex to C. difficile EIA    Collection Time: 03/27/25  9:35 AM    Specimen: Per Rectum; Stool   Result Value Ref Range    C Difficile Toxin B by PCR Negative Negative   CRP inflammation    Collection Time: 03/28/25  7:33 AM   Result Value Ref Range    CRP Inflammation 164.70 (H) <5.00 mg/L   Basic metabolic panel    Collection Time: 03/28/25  7:33 AM   Result Value Ref Range    Sodium 138 135 - 145 mmol/L    Potassium 4.5 3.4 - 5.3 mmol/L    Chloride 98 98 - 107 mmol/L    Carbon Dioxide (CO2) 33 (H) 22 - 29 mmol/L    Anion Gap 7 7 - 15 mmol/L    Urea Nitrogen 13.3 8.0 - 23.0 mg/dL    Creatinine 1.07 (H) 0.51 - 0.95 mg/dL    GFR Estimate 53 (L) >60 mL/min/1.73m2    Calcium 8.5 (L) 8.8 - 10.4 mg/dL    Glucose 95 70 - 99 mg/dL   CBC with platelets    Collection Time: 03/28/25  7:33 AM   Result Value Ref Range    WBC Count 6.5 4.0 - 11.0 10e3/uL    RBC Count 3.06 (L) 3.80 - 5.20 10e6/uL    Hemoglobin 7.7 (L) 11.7 - 15.7 g/dL    Hematocrit 25.7 (L) 35.0 - 47.0 %    MCV 84 78 - 100 fL    MCH 25.2 (L) 26.5 - 33.0 pg    MCHC 30.0 (L) 31.5 - 36.5 g/dL    RDW 18.5 (H) 10.0 - 15.0 %    Platelet Count 587 (H) 150 - 450 10e3/uL   Glucose by meter    Collection Time: 03/29/25  7:49 AM   Result Value Ref Range    GLUCOSE BY METER POCT 89 70 - 99 mg/dL   Hemoglobin    Collection Time: 03/29/25  7:51 AM   Result Value Ref Range    Hemoglobin 8.3 (L) 11.7 - 15.7 g/dL   Extra Green Top Tube (LAB USE ONLY)    Collection Time: 03/29/25  7:51 AM   Result Value Ref Range    Hold Specimen [facility]    Hemoglobin    Collection Time: 03/31/25  8:09 AM   Result Value Ref Range    Hemoglobin 9.1 (L) 11.7 - 15.7 g/dL                 Electronically signed by    Juany Steele MD

## 2025-04-03 ENCOUNTER — MEDICAL CORRESPONDENCE (OUTPATIENT)
Dept: HEALTH INFORMATION MANAGEMENT | Facility: CLINIC | Age: 80
End: 2025-04-03

## 2025-04-03 ENCOUNTER — TRANSITIONAL CARE UNIT VISIT (OUTPATIENT)
Dept: GERIATRICS | Facility: CLINIC | Age: 80
End: 2025-04-03
Payer: COMMERCIAL

## 2025-04-03 VITALS
DIASTOLIC BLOOD PRESSURE: 60 MMHG | OXYGEN SATURATION: 93 % | RESPIRATION RATE: 18 BRPM | SYSTOLIC BLOOD PRESSURE: 135 MMHG | HEART RATE: 68 BPM | HEIGHT: 60 IN | BODY MASS INDEX: 46.72 KG/M2 | WEIGHT: 238 LBS | TEMPERATURE: 98.1 F

## 2025-04-03 DIAGNOSIS — N18.31 CHRONIC KIDNEY DISEASE, STAGE 3A (H): Primary | ICD-10-CM

## 2025-04-03 DIAGNOSIS — G35 MS (MULTIPLE SCLEROSIS) (H): ICD-10-CM

## 2025-04-03 DIAGNOSIS — L97.929 NON-PRESSURE CHRONIC ULCER OF LEFT LOWER LEG, UNSPECIFIED ULCER STAGE (H): ICD-10-CM

## 2025-04-03 NOTE — LETTER
4/3/2025      Elizabeth Kelley  [address]  [facility] [address]        [facility]       Patient Elizabeth Kelley  MRN: [id number]        Reason for Visit     Chief Complaint   Patient presents with     RECHECK     INITIAL       Code Status     CPR/Full code     Assessment     Left lower extremity cellulitis requiring hospitalization currently on Keflex and minocycline to finish the course of antibiotics  Recent hospitalization for right lower extremity cellulitis  Chronic lymphedema  Venous stasis dermatitis  Chronic wounds lower extremity  Hyperlipidemia  MS  anemia  History of hiatal hernia  Chronic anemia  Thrombocytosis  Depression with anxiety  RLS  Morbid obesity BMI >48  Ckd 3a  GERD  Generalized weakness    Plan     Pt is admitted to TCU for strengthening and rehab.  Has venous stasis with lymphedema and wounds  Has had repeat bouts of hospitalizations related to cellulitis  Now on PO abx and will be on low dose of minocycline with no stop date.  Admits to dry legs with pruritus and advised Aquaphor to legs  Cont leg wraps and wound care  Monitor wts due to morbid obesity;some loss noted ;suspect compliance.  She is on lasix and spironolactone for edema  Has been independent in her house but now cannot walk or stand up on her own  Working with PT.  Has impaired mobility due to MS  Mood stable on multiple meds including Wellbutrin and celexa  Has   Recheck labs-hg 9.1; cont fe  Continue with PT/OT    History     Patient is a very pleasant 79 year old female who is admitted to TCU.  Patient was recently admitted for left lower extremity cellulitis.  She is now on Keflex and minocycline continues to improve   she readmitted with increased weakness and deconditioning  Also was noted to have ongoing diarrhea  Workup was negative for C. Difficile  She has had recurrent hospitalizations and was admitted in January 25 for right lower extremity cellulitis with sepsis  It was felt  she is not doing well and TCU recommended and patient now discharged to the TCU    Past Medical & Surgical History     PAST MEDICAL HISTORY:   Past Medical History:   Diagnosis Date     Ankle contracture, left      Class 3 severe obesity due to excess calories without serious comorbidity with body mass index (BMI) of 45.0 to 49.9 in adult (H)      Combined gastric and duodenal ulcer      Controlled substance agreement broken      Depression      Dyslipidemia      Edema      Edema      Gait abnormality      GERD (gastroesophageal reflux disease)      Gout      Lymphedema of both lower extremities      Melanoma (H)     left upper arm     MS (multiple sclerosis) (H)      RLS (restless legs syndrome)      Skin ulcer of left lower leg (H)      Valgus deformity of both feet      Vitamin D deficiency       PAST SURGICAL HISTORY:   has a past surgical history that includes Mammoplasty reduction (Bilateral); Cosmetic surgery (1988); Esophagoscopy, gastroscopy, duodenoscopy (EGD), combined (N/A, 03/30/2015); Spine surgery; Ablation Saphenous Vein W/ Rfa (Right, 04/12/2021); Mohs micrographic procedure; PICC/Midline Placement (8/13/2021); PICC/Midline Placement (9/2/2021); Irrigation and debridement lower extremity, combined (Right, 3/21/2022); Irrigation and debridement lower extremity, combined (Right, 3/28/2022); PICC/Midline Placement (7/21/2024); PICC/Midline Placement (9/8/2024); PICC/Midline Placement (1/14/2025); PICC/Midline Placement (3/20/2025); and Esophagoscopy, gastroscopy, duodenoscopy (EGD), combined (N/A, 3/21/2025).      Past Social History     Reviewed,  reports that she quit smoking about 49 years ago. Her smoking use included cigarettes. She started smoking about 61 years ago. She has a 3 pack-year smoking history. She has never used smokeless tobacco. She reports current alcohol use of about 1.0 standard drink of alcohol per week. She reports that she does not use drugs.    Family History     Reviewed,  and family history includes Arthritis in her maternal grandmother, maternal uncle, paternal grandfather, paternal grandmother, and sister; Asthma in her sister; Brain Cancer in her father; Breast Cancer in her paternal aunt; Colon Cancer in her paternal aunt; Early Death in her maternal grandfather; Hyperlipidemia in her mother, paternal aunt, and sister; Hypertension in her mother, paternal aunt, and sister; Multiple Sclerosis in her mother and sister; Obesity in her sister; Uterine Cancer in her mother.    Medication List     Current Outpatient Medications   Medication Sig Dispense Refill     acetaminophen (TYLENOL) 500 MG tablet Take 1,000 mg by mouth daily as needed for pain.       ascorbic acid (VITAMIN C) 250 MG CHEW chewable tablet Take 250 mg by mouth daily.       buPROPion (WELLBUTRIN SR) 150 MG 12 hr tablet Take 150 mg by mouth every morning        Carboxymethylcellulose Sodium 0.25 % SOLN Apply 1 drop to eye daily as needed       citalopram (CELEXA) 40 MG tablet Take 40 mg by mouth daily (with lunch)       ferrous sulfate (FEROSUL) 325 (65 Fe) MG tablet Take 325 mg by mouth every other day.       furosemide (LASIX) 20 MG tablet Take 3 tablets (60 mg) by mouth daily. 30 tablet 1     gabapentin (NEURONTIN) 100 MG capsule Take 100 mg by mouth 3 times daily       HYDROmorphone (DILAUDID) 2 MG tablet Take 0.5-1 tablets (1-2 mg) by mouth every 6 hours as needed for moderate to severe pain. 15 tablet 0     lactobacillus rhamnosus, GG, (CULTURELL) capsule Take 1 capsule by mouth 2 times daily.       magnesium oxide (MAG-OX) 400 MG tablet Take 1 tablet by mouth every morning       minocycline (DYNACIN) 50 MG tablet Take 1 tablet (50 mg) by mouth daily.       multivitamin w/minerals (CENTRUM ADULTS) tablet Take 1 tablet by mouth every morning       nitroGLYcerin (NITROSTAT) 0.4 MG sublingual tablet PLACE 1 TABLET UNDER TONGUE EVERY 5 MINTUES AS NEEDED FOR CHEST PAIN FOR UP TO 30 DAYS       nystatin (MYCOSTATIN)  146223 UNIT/GM external powder Apply topically 2 times daily as needed.       pantoprazole (PROTONIX) 40 MG EC tablet Take 1 tablet (40 mg) by mouth 2 times daily. 60 tablet 1     potassium chloride ER (K-TAB) 20 MEQ CR tablet Take 20 mEq by mouth 2 times daily.       rOPINIRole (REQUIP) 1 MG tablet Take 0.5 mg by mouth 2 times daily. Take one-half tablet (dose = 0.5 mg) twice daily. Once in the morning and once about dinnertime.     In addition, take one tablet (1 mg) at bedtime.       simvastatin (ZOCOR) 40 MG tablet [SIMVASTATIN (ZOCOR) 40 MG TABLET] Take 40 mg by mouth bedtime.       spironolactone (ALDACTONE) 25 MG tablet Take 25 mg by mouth every morning       vitamin B-Complex Take 1 tablet by mouth daily.       vitamin D3 (CHOLECALCIFEROL) 250 mcg (89534 units) capsule Take 1 capsule by mouth daily.       Wound Cleansers (VASHE WOUND THERAPY EX) Externally apply topically daily as needed       zinc 50 MG TABS Take 1 tablet by mouth three times a week. On Tues, Thur, Sat       No current facility-administered medications for this visit.      MED REC REQUIRED  Post Medication Reconciliation Status:        Allergies     Allergies   Allergen Reactions     Other Environmental Allergy Anaphylaxis     Bees/Wasps Stings  Bees/Wasps Stings     Adhesive Tape Other (See Comments)     Red,itchy and oozes  Red,itchy and oozes     Adhesive [Cyanoacrylate] Unknown     Other reaction(s): sores     Latex Hives     Ragweeds Itching     Oxycodone-Acetaminophen Rash     Vicodin [Hydrocodone-Acetaminophen] Nausea and Vomiting and Hives       Review of Systems   A comprehensive review of 14 systems was done. Pertinent findings noted here and in history of present illness. All the rest negative.  Constitutional: Negative.  Negative for fever, chills, she has  activity change, appetite change and fatigue.   HENT: Negative for congestion and facial swelling.    Eyes: Negative for photophobia, redness and visual disturbance.    Respiratory: Negative for cough and chest tightness.    Cardiovascular: Negative for chest pain, palpitations and has chronic leg swelling.   Gastrointestinal: Negative for nausea, diarrhea, constipation, blood in stool and abdominal distention.   Genitourinary: Negative.    Musculoskeletal: has knee pain due to walking and PT  Skin: Negative.  Pruritus in her legs   Stasis dermatitis  Neurological: Negative for dizziness, tremors, syncope, weakness, light-headedness and headaches.   Hematological: Does not bruise/bleed easily.   Psychiatric/Behavioral: Negative.        Physical Exam   /60   Pulse 68   Temp 98.1  F (36.7  C)   Resp 18   Ht 1.524 m (5')   Wt 108 kg (238 lb)   SpO2 93%   BMI 46.48 kg/m       GENERAL: no acute distress. Cooperative in conversation.   obese  HEENT: pupils are equal, round and reactive. Oral mucosa is moist and intact.  RESP:Chest symmetric. Regular respiratory rate. No stridor.  CVS s1s2  ABD: Nondistended, soft.  EXTREMITIES: 3-4+ lower extremity edema.   Has stasis dermatitis;OA on left leg  NEURO: non focal. Alert and oriented x3.   PSYCH: within normal limits. No depression or anxiety.  SKIN: warm dry intact           Lab Results     Recent Results (from the past 240 hours)   CRP inflammation    Collection Time: 03/25/25  6:36 AM   Result Value Ref Range    CRP Inflammation 150.20 (H) <5.00 mg/L   CBC with platelets and differential    Collection Time: 03/25/25  6:36 AM   Result Value Ref Range    WBC Count 8.3 4.0 - 11.0 10e3/uL    RBC Count 3.37 (L) 3.80 - 5.20 10e6/uL    Hemoglobin 8.3 (L) 11.7 - 15.7 g/dL    Hematocrit 28.1 (L) 35.0 - 47.0 %    MCV 83 78 - 100 fL    MCH 24.6 (L) 26.5 - 33.0 pg    MCHC 29.5 (L) 31.5 - 36.5 g/dL    RDW 18.9 (H) 10.0 - 15.0 %    Platelet Count 639 (H) 150 - 450 10e3/uL    % Neutrophils 65 %    % Lymphocytes 16 %    % Monocytes 15 %    % Eosinophils 2 %    % Basophils 1 %    % Immature Granulocytes 1 %    NRBCs per 100 WBC 0 <1 /100     Absolute Neutrophils 5.4 1.6 - 8.3 10e3/uL    Absolute Lymphocytes 1.3 0.8 - 5.3 10e3/uL    Absolute Monocytes 1.3 0.0 - 1.3 10e3/uL    Absolute Eosinophils 0.2 0.0 - 0.7 10e3/uL    Absolute Basophils 0.1 0.0 - 0.2 10e3/uL    Absolute Immature Granulocytes 0.1 <=0.4 10e3/uL    Absolute NRBCs 0.0 10e3/uL   Basic metabolic panel    Collection Time: 03/26/25  8:54 PM   Result Value Ref Range    Sodium 140 135 - 145 mmol/L    Potassium 4.2 3.4 - 5.3 mmol/L    Chloride 98 98 - 107 mmol/L    Carbon Dioxide (CO2) 34 (H) 22 - 29 mmol/L    Anion Gap 8 7 - 15 mmol/L    Urea Nitrogen 16.1 8.0 - 23.0 mg/dL    Creatinine 0.96 (H) 0.51 - 0.95 mg/dL    GFR Estimate 61 >60 mL/min/1.73m2    Calcium 9.0 8.8 - 10.4 mg/dL    Glucose 91 70 - 99 mg/dL   Lactic acid whole blood with 1x repeat in 2 hr when >2    Collection Time: 03/26/25  8:54 PM   Result Value Ref Range    Lactic Acid, Initial 1.4 0.7 - 2.0 mmol/L   CBC (+ platelets, no diff)    Collection Time: 03/26/25  8:54 PM   Result Value Ref Range    WBC Count 10.4 4.0 - 11.0 10e3/uL    RBC Count 3.46 (L) 3.80 - 5.20 10e6/uL    Hemoglobin 8.8 (L) 11.7 - 15.7 g/dL    Hematocrit 28.6 (L) 35.0 - 47.0 %    MCV 83 78 - 100 fL    MCH 25.4 (L) 26.5 - 33.0 pg    MCHC 30.8 (L) 31.5 - 36.5 g/dL    RDW 18.6 (H) 10.0 - 15.0 %    Platelet Count 698 (H) 150 - 450 10e3/uL   Magnesium    Collection Time: 03/26/25  8:54 PM   Result Value Ref Range    Magnesium 1.9 1.7 - 2.3 mg/dL   CRP inflammation    Collection Time: 03/26/25  8:54 PM   Result Value Ref Range    CRP Inflammation 207.30 (H) <5.00 mg/L   TSH with free T4 reflex    Collection Time: 03/26/25  8:54 PM   Result Value Ref Range    TSH 5.42 (H) 0.30 - 4.20 uIU/mL   T4 free    Collection Time: 03/26/25  8:54 PM   Result Value Ref Range    Free T4 1.33 0.90 - 1.70 ng/dL   UA with Microscopic reflex to Culture    Collection Time: 03/27/25  6:24 AM    Specimen: Urine, Catheter   Result Value Ref Range    Color Urine Yellow Colorless,  Straw, Light Yellow, Yellow    Appearance Urine Clear Clear    Glucose Urine Negative Negative mg/dL    Bilirubin Urine Negative Negative    Ketones Urine Trace (A) Negative mg/dL    Specific Gravity Urine 1.034 (H) 1.001 - 1.030    Blood Urine Negative Negative    pH Urine 6.5 5.0 - 7.0    Protein Albumin Urine 50 (A) Negative mg/dL    Urobilinogen Urine 4.0 (A) Normal mg/dL    Nitrite Urine Negative Negative    Leukocyte Esterase Urine Negative Negative    RBC Urine 1 <=2 /HPF    WBC Urine 3 <=5 /HPF    Squamous Epithelials Urine 4 (H) <=1 /HPF   CBC with platelets    Collection Time: 03/27/25  8:27 AM   Result Value Ref Range    WBC Count 9.0 4.0 - 11.0 10e3/uL    RBC Count 3.06 (L) 3.80 - 5.20 10e6/uL    Hemoglobin 7.8 (L) 11.7 - 15.7 g/dL    Hematocrit 25.4 (L) 35.0 - 47.0 %    MCV 83 78 - 100 fL    MCH 25.5 (L) 26.5 - 33.0 pg    MCHC 30.7 (L) 31.5 - 36.5 g/dL    RDW 18.6 (H) 10.0 - 15.0 %    Platelet Count 617 (H) 150 - 450 10e3/uL   Extra Green Top (Lithium Heparin) Tube    Collection Time: 03/27/25  8:27 AM   Result Value Ref Range    Hold Specimen [facility]    Enteric Bacteria and Virus Panel PCR    Collection Time: 03/27/25  9:35 AM    Specimen: Per Rectum; Stool   Result Value Ref Range    Campylobacter species Negative Negative    Salmonella species Negative Negative    Vibrio species Negative Negative    Vibrio cholerae Negative Negative    Yersinia enterocolitica Negative Negative    Enteropathogenic E. coli (EPEC) Negative Negative, NA    Shiga-like toxin-producing E. coli (STEC) Negative Negative    Shigella/Enteroinvasive E. coli (EIEC) Negative Negative    Cryptosporidium species Negative Negative    Giardia lamblia Negative Negative    Norovirus Gl/Gll Negative Negative    Rotavirus A Negative Negative    Plesiomonas shigelloides Negative Negative    Enteroaggregative E. coli (EAEC) Negative Negative    Enterotoxigenic E. coli (ETEC) Negative Negative    E. coli O157 NA Negative, NA    Cyclospora  cayetanensis Negative Negative    Entamoeba histolytica Negative Negative    Adenovirus F40/41 Negative Negative    Astrovirus Negative Negative    Sapovirus Negative Negative   C. difficile Toxin B PCR with reflex to C. difficile EIA    Collection Time: 03/27/25  9:35 AM    Specimen: Per Rectum; Stool   Result Value Ref Range    C Difficile Toxin B by PCR Negative Negative   CRP inflammation    Collection Time: 03/28/25  7:33 AM   Result Value Ref Range    CRP Inflammation 164.70 (H) <5.00 mg/L   Basic metabolic panel    Collection Time: 03/28/25  7:33 AM   Result Value Ref Range    Sodium 138 135 - 145 mmol/L    Potassium 4.5 3.4 - 5.3 mmol/L    Chloride 98 98 - 107 mmol/L    Carbon Dioxide (CO2) 33 (H) 22 - 29 mmol/L    Anion Gap 7 7 - 15 mmol/L    Urea Nitrogen 13.3 8.0 - 23.0 mg/dL    Creatinine 1.07 (H) 0.51 - 0.95 mg/dL    GFR Estimate 53 (L) >60 mL/min/1.73m2    Calcium 8.5 (L) 8.8 - 10.4 mg/dL    Glucose 95 70 - 99 mg/dL   CBC with platelets    Collection Time: 03/28/25  7:33 AM   Result Value Ref Range    WBC Count 6.5 4.0 - 11.0 10e3/uL    RBC Count 3.06 (L) 3.80 - 5.20 10e6/uL    Hemoglobin 7.7 (L) 11.7 - 15.7 g/dL    Hematocrit 25.7 (L) 35.0 - 47.0 %    MCV 84 78 - 100 fL    MCH 25.2 (L) 26.5 - 33.0 pg    MCHC 30.0 (L) 31.5 - 36.5 g/dL    RDW 18.5 (H) 10.0 - 15.0 %    Platelet Count 587 (H) 150 - 450 10e3/uL   Glucose by meter    Collection Time: 03/29/25  7:49 AM   Result Value Ref Range    GLUCOSE BY METER POCT 89 70 - 99 mg/dL   Hemoglobin    Collection Time: 03/29/25  7:51 AM   Result Value Ref Range    Hemoglobin 8.3 (L) 11.7 - 15.7 g/dL   Extra Green Top Tube (LAB USE ONLY)    Collection Time: 03/29/25  7:51 AM   Result Value Ref Range    Hold Specimen [facility]    Hemoglobin    Collection Time: 03/31/25  8:09 AM   Result Value Ref Range    Hemoglobin 9.1 (L) 11.7 - 15.7 g/dL                 Electronically signed by    Juany Steele MD                            Sincerely,        Juany Steele,  MBBS    Electronically signed

## 2025-04-07 ENCOUNTER — TRANSITIONAL CARE UNIT VISIT (OUTPATIENT)
Dept: GERIATRICS | Facility: CLINIC | Age: 80
End: 2025-04-07
Payer: COMMERCIAL

## 2025-04-07 VITALS
DIASTOLIC BLOOD PRESSURE: 65 MMHG | TEMPERATURE: 97.5 F | BODY MASS INDEX: 46.13 KG/M2 | RESPIRATION RATE: 18 BRPM | SYSTOLIC BLOOD PRESSURE: 136 MMHG | HEART RATE: 74 BPM | OXYGEN SATURATION: 95 % | WEIGHT: 235 LBS | HEIGHT: 60 IN

## 2025-04-07 DIAGNOSIS — R68.83 CHILLS: Primary | ICD-10-CM

## 2025-04-07 DIAGNOSIS — R50.9 FEVER, UNSPECIFIED FEVER CAUSE: ICD-10-CM

## 2025-04-07 PROCEDURE — 99309 SBSQ NF CARE MODERATE MDM 30: CPT | Performed by: NURSE PRACTITIONER

## 2025-04-07 NOTE — LETTER
4/7/2025      Elizabeth Kelley  [address]  [facility] [address]        [facility]  Chief Complaint   [facility]     [facility]     [facility] Medical Record Number:  [id number]  Place of Service where encounter took place:  [facility] ([facility]) [[id number]]  Code Status:  Full Code     HISTORY:      HPI:  Elizabeth Kelley  is 79 year old (1945) undergoing physical and occupational therapy. She is with has PMHx of recurrent right lower extremity cellulitis, chronic lower extremity lymphedema, chronic venous stasis, chronic anemia, RLS, and HLD. She has a history of recurrent hospitalizations for lower extremity cellulitis.  Excerpted from records Patient recently was admitted 1/12 - 1/18/2025 for RIGHT lower extremity cellulitis and sepsis, treated with meropenem and vancomycin, and she was discharged on doxycycline.  She was again hospitalized 3/12 - 3/25/25 for LEFT lower extremity cellulitis. She was treated with Vanco and Zosyn/ceftriaxone and discharged with Keflex. She was recommended to be discharged to TCU, but she declined. She returned to the ER on 3/26/25 for inability to care for self at home and new onset diarrhea.     Today she is seen to review vital signs, and for reports of temperature of 100.2 over the weekend with chills.  She did receive a COVID-vaccine on 4/3/2025.  She was seen at the bedside this morning and reported all her symptoms have resolved.  Today temp 97.5.  She does have a history of MS not on medications.  She denied chest pain shortness of breath cough congestion constipation or diarrhea.  She is with bilateral lower extremity cellulitis.  Right leg significantly larger than the left and she tells writer she has had ultrasounds done to rule out DVTs.  She completed Keflex and minocycline on 4/1/2025 and then will be on a lower dose of the minocycline indefinitely.  Wound care team to monitor lower extremity wounds.  Hemoglobin  9.1  up from 8.3 she did receive blood transfusions in the hospital.  Per her report she lives with her significant other.  She does have restless leg syndrome and on medications and did not feel she needed this increased.    ALLERGIES:Other environmental allergy, Adhesive tape, Adhesive [cyanoacrylate], Latex, Ragweeds, Oxycodone-acetaminophen, and Vicodin [hydrocodone-acetaminophen]    PAST MEDICAL HISTORY:   Past Medical History:   Diagnosis Date     Ankle contracture, left      Class 3 severe obesity due to excess calories without serious comorbidity with body mass index (BMI) of 45.0 to 49.9 in adult (H)      Combined gastric and duodenal ulcer      Controlled substance agreement broken      Depression      Dyslipidemia      Edema      Edema      Gait abnormality      GERD (gastroesophageal reflux disease)      Gout      Lymphedema of both lower extremities      Melanoma (H)     left upper arm     MS (multiple sclerosis) (H)      RLS (restless legs syndrome)      Skin ulcer of left lower leg (H)      Valgus deformity of both feet      Vitamin D deficiency        PAST SURGICAL HISTORY:   has a past surgical history that includes Mammoplasty reduction (Bilateral); Cosmetic surgery (1988); Esophagoscopy, gastroscopy, duodenoscopy (EGD), combined (N/A, 03/30/2015); Spine surgery; Ablation Saphenous Vein W/ Rfa (Right, 04/12/2021); Mohs micrographic procedure; PICC/Midline Placement (8/13/2021); PICC/Midline Placement (9/2/2021); Irrigation and debridement lower extremity, combined (Right, 3/21/2022); Irrigation and debridement lower extremity, combined (Right, 3/28/2022); PICC/Midline Placement (7/21/2024); PICC/Midline Placement (9/8/2024); PICC/Midline Placement (1/14/2025); PICC/Midline Placement (3/20/2025); and Esophagoscopy, gastroscopy, duodenoscopy (EGD), combined (N/A, 3/21/2025).    FAMILY HISTORY: family history includes Arthritis in her maternal grandmother, maternal uncle, paternal grandfather, paternal  grandmother, and sister; Asthma in her sister; Brain Cancer in her father; Breast Cancer in her paternal aunt; Colon Cancer in her paternal aunt; Early Death in her maternal grandfather; Hyperlipidemia in her mother, paternal aunt, and sister; Hypertension in her mother, paternal aunt, and sister; Multiple Sclerosis in her mother and sister; Obesity in her sister; Uterine Cancer in her mother.    SOCIAL HISTORY:  reports that she quit smoking about 49 years ago. Her smoking use included cigarettes. She started smoking about 61 years ago. She has a 3 pack-year smoking history. She has never used smokeless tobacco. She reports current alcohol use of about 1.0 standard drink of alcohol per week. She reports that she does not use drugs.    ROS:  Constitutional: Negative for activity change, appetite change, fatigue and fever.   HENT: Negative for congestion.    Respiratory: Negative for cough, shortness of breath and wheezing.    Cardiovascular: Negative for chest pain and positive leg swelling.   Gastrointestinal: Negative for abdominal distention, abdominal pain, constipation, diarrhea and nausea.   Genitourinary: Negative for dysuria.   Musculoskeletal: Negative for arthralgia. Negative for back pain.   Skin: Negative for color change and wound.  Bilateral lower extremity wounds dressing changes in place wound care team to follow  Neurological: Negative for dizziness.   Psychiatric/Behavioral: Negative for agitation, behavioral problems and confusion.     Physical Exam:  Constitutional:       Appearance: Patient is well-developed.   HENT:      Head: Normocephalic.   Eyes:      Conjunctiva/sclera: Conjunctivae normal.   Neck:      Musculoskeletal: Normal range of motion.   Cardiovascular:      Rate and Rhythm: Normal rate and regular rhythm.      Heart sounds: Normal heart sounds. No murmur.   Pulmonary:      Effort: No respiratory distress.      Breath sounds: Normal breath sounds. No wheezing or rales.   Abdominal:       General: Bowel sounds are normal. There is no distension.      Palpations: Abdomen is soft.      Tenderness: There is no abdominal tenderness.   Musculoskeletal:       Normal range of motion.  Lower extremity edema right leg greater than the left  Skin:General:        Skin is warm.  Bilateral lower extremity cellulitis  Neurological:         Mental Status: Patient is alert and oriented to person, place, and time.  Having intermittent twitches.  Encouraged her to follow-up with her neurologist  Psychiatric:         Behavior: Behavior normal.     Vitals:/65   Pulse 74   Temp 97.5  F (36.4  C)   Resp 18   Ht 1.524 m (5')   Wt 106.6 kg (235 lb)   SpO2 95%   BMI 45.90 kg/m   and Body mass index is 45.9 kg/m .    Lab/Diagnostic data:   Recent Results (from the past 240 hours)   Glucose by meter    Collection Time: 03/29/25  7:49 AM   Result Value Ref Range    GLUCOSE BY METER POCT 89 70 - 99 mg/dL   Hemoglobin    Collection Time: 03/29/25  7:51 AM   Result Value Ref Range    Hemoglobin 8.3 (L) 11.7 - 15.7 g/dL   Extra Green Top Tube (LAB USE ONLY)    Collection Time: 03/29/25  7:51 AM   Result Value Ref Range    Hold Specimen JIC    Hemoglobin    Collection Time: 03/31/25  8:09 AM   Result Value Ref Range    Hemoglobin 9.1 (L) 11.7 - 15.7 g/dL        MEDICATIONS:  MED REC REQUIRED  Post Medication Reconciliation Status: discharge medications reconciled, continue medications without change          Review of your medicines            Accurate as of April 7, 2025  9:58 AM. If you have any questions, ask your nurse or doctor.                CONTINUE these medicines which have NOT CHANGED        Dose / Directions   acetaminophen 500 MG tablet  Commonly known as: TYLENOL      Dose: 1,000 mg  Take 1,000 mg by mouth every 6 hours as needed for pain.  Refills: 0     ascorbic acid 250 MG Chew chewable tablet  Commonly known as: vitamin C      Dose: 250 mg  Take 250 mg by mouth daily.  Refills: 0     buPROPion 150  MG 12 hr tablet  Commonly known as: WELLBUTRIN SR      Dose: 150 mg  Take 150 mg by mouth every morning  Refills: 0     Carboxymethylcellulose Sodium 0.25 % Soln      Dose: 1 drop  Apply 1 drop to eye daily as needed  Refills: 0     citalopram 40 MG tablet  Commonly known as: celeXA      Dose: 40 mg  Take 40 mg by mouth daily (with lunch)  Refills: 0     diclofenac 1 % topical gel  Commonly known as: VOLTAREN      Dose: 2 g  Apply 2 g topically 4 times daily as needed for moderate pain.  Refills: 0     ferrous sulfate 325 (65 Fe) MG tablet  Commonly known as: FEROSUL      Dose: 325 mg  Take 325 mg by mouth every other day.  Refills: 0     furosemide 20 MG tablet  Commonly known as: LASIX  Used for: Cellulitis of right lower leg [L03.115]      Dose: 60 mg  Take 3 tablets (60 mg) by mouth daily.  Quantity: 30 tablet  Refills: 1     gabapentin 100 MG capsule  Commonly known as: NEURONTIN      Dose: 100 mg  Take 100 mg by mouth 3 times daily  Refills: 0     HYDROmorphone 2 MG tablet  Commonly known as: DILAUDID  Used for: Cellulitis of left lower extremity [L03.116], Cellulitis of right lower leg [L03.115]      Dose: 1-2 mg  Take 0.5-1 tablets (1-2 mg) by mouth every 6 hours as needed for moderate to severe pain.  Quantity: 15 tablet  Refills: 0     lactobacillus rhamnosus (GG) capsule  Used for: Cellulitis of left lower extremity [L03.116], Cellulitis of right lower leg [L03.115]      Dose: 1 capsule  Take 1 capsule by mouth 2 times daily.  Refills: 0     magnesium oxide 400 MG tablet  Commonly known as: MAG-OX      Dose: 1 tablet  Take 1 tablet by mouth every morning  Refills: 0     minocycline 50 MG tablet  Commonly known as: DYNACIN  Used for: Cellulitis of right lower leg [L03.115]      Dose: 50 mg  Take 1 tablet (50 mg) by mouth daily.  Refills: 0     multivitamin w/minerals tablet      Dose: 1 tablet  Take 1 tablet by mouth every morning  Refills: 0     nitroGLYcerin 0.4 MG sublingual tablet  Commonly known as:  NITROSTAT      PLACE 1 TABLET UNDER TONGUE EVERY 5 MINTUES AS NEEDED FOR CHEST PAIN FOR UP TO 30 DAYS  Refills: 0     nystatin 532847 UNIT/GM external powder  Commonly known as: MYCOSTATIN      Apply topically 2 times daily as needed.  Refills: 0     pantoprazole 40 MG EC tablet  Commonly known as: PROTONIX  Used for: Cellulitis of right lower leg [L03.115]      Dose: 40 mg  Take 1 tablet (40 mg) by mouth 2 times daily.  Quantity: 60 tablet  Refills: 1     potassium chloride ER 20 MEQ CR tablet  Commonly known as: K-TAB      Dose: 20 mEq  Take 20 mEq by mouth 2 times daily.  Refills: 0     rOPINIRole 1 MG tablet  Commonly known as: REQUIP      Dose: 0.5 mg  Take 0.5 mg by mouth 2 times daily. Take one-half tablet (dose = 0.5 mg) twice daily. Once in the morning and once about dinnertime.     In addition, take one tablet (1 mg) at bedtime.  Refills: 0     simvastatin 40 MG tablet  Commonly known as: ZOCOR      Dose: 40 mg  [SIMVASTATIN (ZOCOR) 40 MG TABLET] Take 40 mg by mouth bedtime.  Refills: 0     spironolactone 25 MG tablet  Commonly known as: ALDACTONE      Dose: 25 mg  Take 25 mg by mouth every morning  Refills: 0     VASHE WOUND THERAPY EX      Externally apply topically daily as needed  Refills: 0     vitamin B-Complex      Dose: 1 tablet  Take 1 tablet by mouth daily.  Refills: 0     vitamin D3 250 mcg (98619 units) capsule  Commonly known as: CHOLECALCIFEROL      Dose: 1 capsule  Take 1 capsule by mouth daily.  Refills: 0     zinc 50 MG Tabs      Dose: 1 tablet  Take 1 tablet by mouth three times a week. On Tues, Thur, Sat  Refills: 0              ASSESSMENT/PLAN  Encounter Diagnoses   Name Primary?     Chills Yes     Fever, unspecified fever cause        Twitches intermittent on medications for restless leg syndrome declined an increase in these meds denied the twitches affecting her sleep and recommended she update her neurologist    Physical deconditioning PT OT    Pain management as needed Tylenol  daily, Dilaudid 1 to 2 mg every 6 hours as needed    GERD on Protonix    Cellulitis bilateral lower extremities dressing changes as ordered, will complete minocycline and Keflex on 4/1/2025 and then be on a lower dose minocycline indefinitely    Restless leg syndrome continue ropinirole    Mood disorder on bupropion, Celexa 40 mg daily    Anemia on ferrous sulfate daily monitor labs hemoglobin on 3/31/2025 was 9.1 up from 8.3    Edema continue Lasix 20 mg daily    Neuropathy continue gabapentin    CHF Daily weights, continue Lasix spironolactone    Electronically signed by: Rosario Styles CNP       Sincerely,        Rosario Sytles CNP    Electronically signed

## 2025-04-07 NOTE — PROGRESS NOTES
[facility] GERIATRIC SERVICES  Chief Complaint   Patient presents with    JORGE     [place] Medical Record Number:  [id number]  Place of Service where encounter took place:  [facility] ([facility]) [[id number]]  Code Status:  Full Code     HISTORY:      HPI:  Elizabeth Kelley  is 79 year old (1945) undergoing physical and occupational therapy. She is with has PMHx of recurrent right lower extremity cellulitis, chronic lower extremity lymphedema, chronic venous stasis, chronic anemia, RLS, and HLD. She has a history of recurrent hospitalizations for lower extremity cellulitis.  Excerpted from records Patient recently was admitted 1/12 - 1/18/2025 for RIGHT lower extremity cellulitis and sepsis, treated with meropenem and vancomycin, and she was discharged on doxycycline.  She was again hospitalized 3/12 - 3/25/25 for LEFT lower extremity cellulitis. She was treated with Vanco and Zosyn/ceftriaxone and discharged with Keflex. She was recommended to be discharged to TCU, but she declined. She returned to the ER on 3/26/25 for inability to care for self at home and new onset diarrhea.     Today she is seen to review vital signs, and for reports of temperature of 100.2 over the weekend with chills.  She did receive a COVID-vaccine on 4/3/2025.  She was seen at the bedside this morning and reported all her symptoms have resolved.  Today temp 97.5.  She does have a history of MS not on medications.  She denied chest pain shortness of breath cough congestion constipation or diarrhea.  She is with bilateral lower extremity cellulitis.  Right leg significantly larger than the left and she tells writer she has had ultrasounds done to rule out DVTs.  She completed Keflex and minocycline on 4/1/2025 and then will be on a lower dose of the minocycline indefinitely.  Wound care team to monitor lower extremity wounds.  Hemoglobin  9.1 up from 8.3 she did receive blood transfusions in the hospital.  Per her report she lives  with her significant other.  She does have restless leg syndrome and on medications and did not feel she needed this increased.    ALLERGIES:Other environmental allergy, Adhesive tape, Adhesive [cyanoacrylate], Latex, Ragweeds, Oxycodone-acetaminophen, and Vicodin [hydrocodone-acetaminophen]    PAST MEDICAL HISTORY:   Past Medical History:   Diagnosis Date    Ankle contracture, left     Class 3 severe obesity due to excess calories without serious comorbidity with body mass index (BMI) of 45.0 to 49.9 in adult (H)     Combined gastric and duodenal ulcer     Controlled substance agreement broken     Depression     Dyslipidemia     Edema     Edema     Gait abnormality     GERD (gastroesophageal reflux disease)     Gout     Lymphedema of both lower extremities     Melanoma (H)     left upper arm    MS (multiple sclerosis) (H)     RLS (restless legs syndrome)     Skin ulcer of left lower leg (H)     Valgus deformity of both feet     Vitamin D deficiency        PAST SURGICAL HISTORY:   has a past surgical history that includes Mammoplasty reduction (Bilateral); Cosmetic surgery (1988); Esophagoscopy, gastroscopy, duodenoscopy (EGD), combined (N/A, 03/30/2015); Spine surgery; Ablation Saphenous Vein W/ Rfa (Right, 04/12/2021); Mohs micrographic procedure; PICC/Midline Placement (8/13/2021); PICC/Midline Placement (9/2/2021); Irrigation and debridement lower extremity, combined (Right, 3/21/2022); Irrigation and debridement lower extremity, combined (Right, 3/28/2022); PICC/Midline Placement (7/21/2024); PICC/Midline Placement (9/8/2024); PICC/Midline Placement (1/14/2025); PICC/Midline Placement (3/20/2025); and Esophagoscopy, gastroscopy, duodenoscopy (EGD), combined (N/A, 3/21/2025).    FAMILY HISTORY: family history includes Arthritis in her maternal grandmother, maternal uncle, paternal grandfather, paternal grandmother, and sister; Asthma in her sister; Brain Cancer in her father; Breast Cancer in her paternal aunt;  Colon Cancer in her paternal aunt; Early Death in her maternal grandfather; Hyperlipidemia in her mother, paternal aunt, and sister; Hypertension in her mother, paternal aunt, and sister; Multiple Sclerosis in her mother and sister; Obesity in her sister; Uterine Cancer in her mother.    SOCIAL HISTORY:  reports that she quit smoking about 49 years ago. Her smoking use included cigarettes. She started smoking about 61 years ago. She has a 3 pack-year smoking history. She has never used smokeless tobacco. She reports current alcohol use of about 1.0 standard drink of alcohol per week. She reports that she does not use drugs.    ROS:  Constitutional: Negative for activity change, appetite change, fatigue and fever.   HENT: Negative for congestion.    Respiratory: Negative for cough, shortness of breath and wheezing.    Cardiovascular: Negative for chest pain and positive leg swelling.   Gastrointestinal: Negative for abdominal distention, abdominal pain, constipation, diarrhea and nausea.   Genitourinary: Negative for dysuria.   Musculoskeletal: Negative for arthralgia. Negative for back pain.   Skin: Negative for color change and wound.  Bilateral lower extremity wounds dressing changes in place wound care team to follow  Neurological: Negative for dizziness.   Psychiatric/Behavioral: Negative for agitation, behavioral problems and confusion.     Physical Exam:  Constitutional:       Appearance: Patient is well-developed.   HENT:      Head: Normocephalic.   Eyes:      Conjunctiva/sclera: Conjunctivae normal.   Neck:      Musculoskeletal: Normal range of motion.   Cardiovascular:      Rate and Rhythm: Normal rate and regular rhythm.      Heart sounds: Normal heart sounds. No murmur.   Pulmonary:      Effort: No respiratory distress.      Breath sounds: Normal breath sounds. No wheezing or rales.   Abdominal:      General: Bowel sounds are normal. There is no distension.      Palpations: Abdomen is soft.       Tenderness: There is no abdominal tenderness.   Musculoskeletal:       Normal range of motion.  Lower extremity edema right leg greater than the left  Skin:General:        Skin is warm.  Bilateral lower extremity cellulitis  Neurological:         Mental Status: Patient is alert and oriented to person, place, and time.  Having intermittent twitches.  Encouraged her to follow-up with her neurologist  Psychiatric:         Behavior: Behavior normal.     Vitals:/65   Pulse 74   Temp 97.5  F (36.4  C)   Resp 18   Ht 1.524 m (5')   Wt 106.6 kg (235 lb)   SpO2 95%   BMI 45.90 kg/m   and Body mass index is 45.9 kg/m .    Lab/Diagnostic data:   Recent Results (from the past 240 hours)   Glucose by meter    Collection Time: 03/29/25  7:49 AM   Result Value Ref Range    GLUCOSE BY METER POCT 89 70 - 99 mg/dL   Hemoglobin    Collection Time: 03/29/25  7:51 AM   Result Value Ref Range    Hemoglobin 8.3 (L) 11.7 - 15.7 g/dL   Extra Green Top Tube (LAB USE ONLY)    Collection Time: 03/29/25  7:51 AM   Result Value Ref Range    Hold Specimen JIC    Hemoglobin    Collection Time: 03/31/25  8:09 AM   Result Value Ref Range    Hemoglobin 9.1 (L) 11.7 - 15.7 g/dL        MEDICATIONS:  MED REC REQUIRED  Post Medication Reconciliation Status: discharge medications reconciled, continue medications without change          Review of your medicines            Accurate as of April 7, 2025  9:58 AM. If you have any questions, ask your nurse or doctor.                CONTINUE these medicines which have NOT CHANGED        Dose / Directions   acetaminophen 500 MG tablet  Commonly known as: TYLENOL      Dose: 1,000 mg  Take 1,000 mg by mouth every 6 hours as needed for pain.  Refills: 0     ascorbic acid 250 MG Chew chewable tablet  Commonly known as: vitamin C      Dose: 250 mg  Take 250 mg by mouth daily.  Refills: 0     buPROPion 150 MG 12 hr tablet  Commonly known as: WELLBUTRIN SR      Dose: 150 mg  Take 150 mg by mouth every  morning  Refills: 0     Carboxymethylcellulose Sodium 0.25 % Soln      Dose: 1 drop  Apply 1 drop to eye daily as needed  Refills: 0     citalopram 40 MG tablet  Commonly known as: celeXA      Dose: 40 mg  Take 40 mg by mouth daily (with lunch)  Refills: 0     diclofenac 1 % topical gel  Commonly known as: VOLTAREN      Dose: 2 g  Apply 2 g topically 4 times daily as needed for moderate pain.  Refills: 0     ferrous sulfate 325 (65 Fe) MG tablet  Commonly known as: FEROSUL      Dose: 325 mg  Take 325 mg by mouth every other day.  Refills: 0     furosemide 20 MG tablet  Commonly known as: LASIX  Used for: Cellulitis of right lower leg [L03.115]      Dose: 60 mg  Take 3 tablets (60 mg) by mouth daily.  Quantity: 30 tablet  Refills: 1     gabapentin 100 MG capsule  Commonly known as: NEURONTIN      Dose: 100 mg  Take 100 mg by mouth 3 times daily  Refills: 0     HYDROmorphone 2 MG tablet  Commonly known as: DILAUDID  Used for: Cellulitis of left lower extremity [L03.116], Cellulitis of right lower leg [L03.115]      Dose: 1-2 mg  Take 0.5-1 tablets (1-2 mg) by mouth every 6 hours as needed for moderate to severe pain.  Quantity: 15 tablet  Refills: 0     lactobacillus rhamnosus (GG) capsule  Used for: Cellulitis of left lower extremity [L03.116], Cellulitis of right lower leg [L03.115]      Dose: 1 capsule  Take 1 capsule by mouth 2 times daily.  Refills: 0     magnesium oxide 400 MG tablet  Commonly known as: MAG-OX      Dose: 1 tablet  Take 1 tablet by mouth every morning  Refills: 0     minocycline 50 MG tablet  Commonly known as: DYNACIN  Used for: Cellulitis of right lower leg [L03.115]      Dose: 50 mg  Take 1 tablet (50 mg) by mouth daily.  Refills: 0     multivitamin w/minerals tablet      Dose: 1 tablet  Take 1 tablet by mouth every morning  Refills: 0     nitroGLYcerin 0.4 MG sublingual tablet  Commonly known as: NITROSTAT      PLACE 1 TABLET UNDER TONGUE EVERY 5 MINTUES AS NEEDED FOR CHEST PAIN FOR UP TO 30  DAYS  Refills: 0     nystatin 696687 UNIT/GM external powder  Commonly known as: MYCOSTATIN      Apply topically 2 times daily as needed.  Refills: 0     pantoprazole 40 MG EC tablet  Commonly known as: PROTONIX  Used for: Cellulitis of right lower leg [L03.115]      Dose: 40 mg  Take 1 tablet (40 mg) by mouth 2 times daily.  Quantity: 60 tablet  Refills: 1     potassium chloride ER 20 MEQ CR tablet  Commonly known as: K-TAB      Dose: 20 mEq  Take 20 mEq by mouth 2 times daily.  Refills: 0     rOPINIRole 1 MG tablet  Commonly known as: REQUIP      Dose: 0.5 mg  Take 0.5 mg by mouth 2 times daily. Take one-half tablet (dose = 0.5 mg) twice daily. Once in the morning and once about dinnertime.     In addition, take one tablet (1 mg) at bedtime.  Refills: 0     simvastatin 40 MG tablet  Commonly known as: ZOCOR      Dose: 40 mg  [SIMVASTATIN (ZOCOR) 40 MG TABLET] Take 40 mg by mouth bedtime.  Refills: 0     spironolactone 25 MG tablet  Commonly known as: ALDACTONE      Dose: 25 mg  Take 25 mg by mouth every morning  Refills: 0     VASHE WOUND THERAPY EX      Externally apply topically daily as needed  Refills: 0     vitamin B-Complex      Dose: 1 tablet  Take 1 tablet by mouth daily.  Refills: 0     vitamin D3 250 mcg (89393 units) capsule  Commonly known as: CHOLECALCIFEROL      Dose: 1 capsule  Take 1 capsule by mouth daily.  Refills: 0     zinc 50 MG Tabs      Dose: 1 tablet  Take 1 tablet by mouth three times a week. On Tues, Thur, Sat  Refills: 0              ASSESSMENT/PLAN  Encounter Diagnoses   Name Primary?    Chills Yes    Fever, unspecified fever cause        Twitches intermittent on medications for restless leg syndrome declined an increase in these meds denied the twitches affecting her sleep and recommended she update her neurologist    Physical deconditioning PT OT    Pain management as needed Tylenol daily, Dilaudid 1 to 2 mg every 6 hours as needed    GERD on Protonix    Cellulitis bilateral lower  extremities dressing changes as ordered, will complete minocycline and Keflex on 4/1/2025 and then be on a lower dose minocycline indefinitely    Restless leg syndrome continue ropinirole    Mood disorder on bupropion, Celexa 40 mg daily    Anemia on ferrous sulfate daily monitor labs hemoglobin on 3/31/2025 was 9.1 up from 8.3    Edema continue Lasix 20 mg daily    Neuropathy continue gabapentin    CHF Daily weights, continue Lasix spironolactone    Electronically signed by: Rosario Styles CNP

## 2025-04-10 ENCOUNTER — TRANSITIONAL CARE UNIT VISIT (OUTPATIENT)
Dept: GERIATRICS | Facility: CLINIC | Age: 80
End: 2025-04-10
Payer: COMMERCIAL

## 2025-04-10 VITALS
DIASTOLIC BLOOD PRESSURE: 61 MMHG | WEIGHT: 232 LBS | HEIGHT: 60 IN | SYSTOLIC BLOOD PRESSURE: 136 MMHG | HEART RATE: 70 BPM | BODY MASS INDEX: 45.55 KG/M2 | RESPIRATION RATE: 20 BRPM | TEMPERATURE: 96.8 F | OXYGEN SATURATION: 96 %

## 2025-04-10 DIAGNOSIS — L97.929 NON-PRESSURE CHRONIC ULCER OF LEFT LOWER LEG, UNSPECIFIED ULCER STAGE (H): Primary | ICD-10-CM

## 2025-04-10 DIAGNOSIS — L03.115 CELLULITIS OF RIGHT LOWER LEG: ICD-10-CM

## 2025-04-10 DIAGNOSIS — R53.81 PHYSICAL DECONDITIONING: ICD-10-CM

## 2025-04-10 DIAGNOSIS — G35 MS (MULTIPLE SCLEROSIS) (H): ICD-10-CM

## 2025-04-10 DIAGNOSIS — G89.4 CHRONIC PAIN SYNDROME: ICD-10-CM

## 2025-04-10 DIAGNOSIS — L03.116 CELLULITIS OF LEFT LOWER EXTREMITY: ICD-10-CM

## 2025-04-10 NOTE — PROGRESS NOTES
[facility] GERIATRIC SERVICES       Patient Elizabeth Kelley  MRN: [id number]        Reason for Visit     Chief Complaint   Patient presents with    RECHECK       Code Status     CPR/Full code     Assessment     Left lower extremity cellulitis requiring hospitalization currently on Keflex and minocycline to finish the course of antibiotics  Recent hospitalization for right lower extremity cellulitis  Chronic lymphedema  Venous stasis dermatitis  Chronic wounds lower extremity  Hyperlipidemia  MS  anemia  History of hiatal hernia  Chronic anemia  Thrombocytosis  Depression with anxiety  RLS  Morbid obesity BMI >45  Ckd 3a  GERD  Generalized weakness    Plan     Pt is admitted to TCU for strengthening and rehab.  Has venous stasis with lymphedema and wounds  Has had repeat bouts of hospitalizations related to cellulitis  Now on PO abx and will be on low dose of minocycline with no stop date.  Admits to dry legs with pruritus and advised Aquaphor to legs  Cont leg wraps and wound care.  Reports improvement in lymphedema  Weights are also down and has wraps again at home  Monitor wts due to morbid obesity;some loss noted ;suspect compliance.  She is on lasix and spironolactone for edema  Has been independent in her house but now cannot walk or stand up on her own  Working with PT. but now reporting some improvemen  Has impaired mobility due to MS  Mood stable on multiple meds including Wellbutrin and celexa  Has   Recheck labs-hg 9.1; cont fe  Continue with PT/OT-doing well and hoping to discharge soon    History     Patient is a very pleasant 79 year old female who is admitted to TCU.  Patient was recently admitted for left lower extremity cellulitis.  She was on Keflex and minocycline .  continues to improve   she readmitted with increased weakness and deconditioning  Has baseline limitations due to underlying MS  Also was noted to have ongoing diarrhea  Workup was negative for C. Difficile  This has resolved  She has  had recurrent hospitalizations and was admitted in January 25 for right lower extremity cellulitis with sepsis  It was felt she is not doing well and TCU recommended and patient now discharged to the TCU  Making progress and doing well.    Past Medical & Surgical History     PAST MEDICAL HISTORY:   Past Medical History:   Diagnosis Date    Ankle contracture, left     Class 3 severe obesity due to excess calories without serious comorbidity with body mass index (BMI) of 45.0 to 49.9 in adult (H)     Combined gastric and duodenal ulcer     Controlled substance agreement broken     Depression     Dyslipidemia     Edema     Edema     Gait abnormality     GERD (gastroesophageal reflux disease)     Gout     Lymphedema of both lower extremities     Melanoma (H)     left upper arm    MS (multiple sclerosis) (H)     RLS (restless legs syndrome)     Skin ulcer of left lower leg (H)     Valgus deformity of both feet     Vitamin D deficiency       PAST SURGICAL HISTORY:   has a past surgical history that includes Mammoplasty reduction (Bilateral); Cosmetic surgery (1988); Esophagoscopy, gastroscopy, duodenoscopy (EGD), combined (N/A, 03/30/2015); Spine surgery; Ablation Saphenous Vein W/ Rfa (Right, 04/12/2021); Mohs micrographic procedure; PICC/Midline Placement (8/13/2021); PICC/Midline Placement (9/2/2021); Irrigation and debridement lower extremity, combined (Right, 3/21/2022); Irrigation and debridement lower extremity, combined (Right, 3/28/2022); PICC/Midline Placement (7/21/2024); PICC/Midline Placement (9/8/2024); PICC/Midline Placement (1/14/2025); PICC/Midline Placement (3/20/2025); and Esophagoscopy, gastroscopy, duodenoscopy (EGD), combined (N/A, 3/21/2025).      Past Social History     Reviewed,  reports that she quit smoking about 49 years ago. Her smoking use included cigarettes. She started smoking about 61 years ago. She has a 3 pack-year smoking history. She has never used smokeless tobacco. She reports  current alcohol use of about 1.0 standard drink of alcohol per week. She reports that she does not use drugs.    Family History     Reviewed, and family history includes Arthritis in her maternal grandmother, maternal uncle, paternal grandfather, paternal grandmother, and sister; Asthma in her sister; Brain Cancer in her father; Breast Cancer in her paternal aunt; Colon Cancer in her paternal aunt; Early Death in her maternal grandfather; Hyperlipidemia in her mother, paternal aunt, and sister; Hypertension in her mother, paternal aunt, and sister; Multiple Sclerosis in her mother and sister; Obesity in her sister; Uterine Cancer in her mother.    Medication List     Current Outpatient Medications   Medication Sig Dispense Refill    acetaminophen (TYLENOL) 500 MG tablet Take 1,000 mg by mouth every 6 hours as needed for pain.      ascorbic acid (VITAMIN C) 250 MG CHEW chewable tablet Take 250 mg by mouth daily.      buPROPion (WELLBUTRIN SR) 150 MG 12 hr tablet Take 150 mg by mouth every morning       Carboxymethylcellulose Sodium 0.25 % SOLN Apply 1 drop to eye daily as needed      citalopram (CELEXA) 40 MG tablet Take 40 mg by mouth daily (with lunch)      diclofenac (VOLTAREN) 1 % topical gel Apply 2 g topically 4 times daily as needed for moderate pain.      ferrous sulfate (FEROSUL) 325 (65 Fe) MG tablet Take 325 mg by mouth every other day.      furosemide (LASIX) 20 MG tablet Take 3 tablets (60 mg) by mouth daily. 30 tablet 1    gabapentin (NEURONTIN) 100 MG capsule Take 100 mg by mouth 3 times daily      HYDROmorphone (DILAUDID) 2 MG tablet Take 0.5-1 tablets (1-2 mg) by mouth every 6 hours as needed for moderate to severe pain. 15 tablet 0    lactobacillus rhamnosus, GG, (CULTURELL) capsule Take 1 capsule by mouth 2 times daily.      magnesium oxide (MAG-OX) 400 MG tablet Take 1 tablet by mouth every morning      minocycline (DYNACIN) 50 MG tablet Take 1 tablet (50 mg) by mouth daily.      multivitamin  w/minerals (CENTRUM ADULTS) tablet Take 1 tablet by mouth every morning      nitroGLYcerin (NITROSTAT) 0.4 MG sublingual tablet PLACE 1 TABLET UNDER TONGUE EVERY 5 MINTUES AS NEEDED FOR CHEST PAIN FOR UP TO 30 DAYS      nystatin (MYCOSTATIN) 958374 UNIT/GM external powder Apply topically 2 times daily as needed.      pantoprazole (PROTONIX) 40 MG EC tablet Take 1 tablet (40 mg) by mouth 2 times daily. 60 tablet 1    potassium chloride ER (K-TAB) 20 MEQ CR tablet Take 20 mEq by mouth 2 times daily.      rOPINIRole (REQUIP) 1 MG tablet Take 0.5 mg by mouth 2 times daily. Take one-half tablet (dose = 0.5 mg) twice daily. Once in the morning and once about dinnertime.     In addition, take one tablet (1 mg) at bedtime.      simvastatin (ZOCOR) 40 MG tablet [SIMVASTATIN (ZOCOR) 40 MG TABLET] Take 40 mg by mouth bedtime.      spironolactone (ALDACTONE) 25 MG tablet Take 25 mg by mouth every morning      vitamin B-Complex Take 1 tablet by mouth daily.      vitamin D3 (CHOLECALCIFEROL) 250 mcg (65945 units) capsule Take 1 capsule by mouth daily.      Wound Cleansers (VASHE WOUND THERAPY EX) Externally apply topically daily as needed      zinc 50 MG TABS Take 1 tablet by mouth three times a week. On Tues, Thur, Sat       No current facility-administered medications for this visit.      MED REC REQUIRED  Post Medication Reconciliation Status:        Allergies     Allergies   Allergen Reactions    Other Environmental Allergy Anaphylaxis     Bees/Wasps Stings  Bees/Wasps Stings    Adhesive Tape Other (See Comments)     Red,itchy and oozes  Red,itchy and oozes    Adhesive [Cyanoacrylate] Unknown     Other reaction(s): sores    Latex Hives    Ragweeds Itching    Oxycodone-Acetaminophen Rash    Vicodin [Hydrocodone-Acetaminophen] Nausea and Vomiting and Hives       Review of Systems   A comprehensive review of 14 systems was done. Pertinent findings noted here and in history of present illness. All the rest  negative.  Constitutional: Negative.  Negative for fever, chills, she has  activity change, appetite change and fatigue.   HENT: Negative for congestion and facial swelling.    Eyes: Negative for photophobia, redness and visual disturbance.   Respiratory: Negative for cough and chest tightness.    Cardiovascular: Negative for chest pain, palpitations and has chronic leg swelling.   Gastrointestinal: Negative for nausea, diarrhea, constipation, blood in stool and abdominal distention.   Genitourinary: Negative.    Musculoskeletal: has knee pain due to walking and PT  Skin: Negative.  Pruritus in her legs   Stasis dermatitis  Neurological: Negative for dizziness, tremors, syncope, weakness, light-headedness and headaches.   Hematological: Does not bruise/bleed easily.   Psychiatric/Behavioral: Negative.        Physical Exam   /61   Pulse 70   Temp 96.8  F (36  C)   Resp 20   Ht 1.524 m (5')   Wt 105.2 kg (232 lb)   SpO2 96%   BMI 45.31 kg/m       GENERAL: no acute distress. Cooperative in conversation.   obese  HEENT: pupils are equal, round and reactive. Oral mucosa is moist and intact.  RESP:Chest symmetric. Regular respiratory rate. No stridor.  CVS s1s2  ABD: Nondistended, soft.  EXTREMITIES: 3-+ lower extremity edema.   Has stasis dermatitis;OA on left leg  NEURO: non focal. Alert and oriented x3.   PSYCH: within normal limits. No depression or anxiety.  SKIN: warm dry intact           Lab Results     Last Comprehensive Metabolic Panel:  Lab Results   Component Value Date     03/28/2025    POTASSIUM 4.5 03/28/2025    CHLORIDE 98 03/28/2025    CO2 33 (H) 03/28/2025    ANIONGAP 7 03/28/2025    GLC 89 03/29/2025    BUN 13.3 03/28/2025    CR 1.07 (H) 03/28/2025    GFRESTIMATED 53 (L) 03/28/2025    JUNI 8.5 (L) 03/28/2025         Electronically signed by    Juany Steele MD

## 2025-04-10 NOTE — LETTER
4/10/2025      Elizabeth Kelley  [address]  [facility] [address]        [facility]       Patient Elizabeth Kelley  MRN: [id number]        Reason for Visit     Chief Complaint   Patient presents with     RECHECK       Code Status     CPR/Full code     Assessment     Left lower extremity cellulitis requiring hospitalization currently on Keflex and minocycline to finish the course of antibiotics  Recent hospitalization for right lower extremity cellulitis  Chronic lymphedema  Venous stasis dermatitis  Chronic wounds lower extremity  Hyperlipidemia  MS  anemia  History of hiatal hernia  Chronic anemia  Thrombocytosis  Depression with anxiety  RLS  Morbid obesity BMI >45  Ckd 3a  GERD  Generalized weakness    Plan     Pt is admitted to TCU for strengthening and rehab.  Has venous stasis with lymphedema and wounds  Has had repeat bouts of hospitalizations related to cellulitis  Now on PO abx and will be on low dose of minocycline with no stop date.  Admits to dry legs with pruritus and advised Aquaphor to legs  Cont leg wraps and wound care.  Reports improvement in lymphedema  Weights are also down and has wraps again at home  Monitor wts due to morbid obesity;some loss noted ;suspect compliance.  She is on lasix and spironolactone for edema  Has been independent in her house but now cannot walk or stand up on her own  Working with PT. but now reporting some improvemen  Has impaired mobility due to MS  Mood stable on multiple meds including Wellbutrin and celexa  Has   Recheck labs-hg 9.1; cont fe  Continue with PT/OT-doing well and hoping to discharge soon    History     Patient is a very pleasant 79 year old female who is admitted to TCU.  Patient was recently admitted for left lower extremity cellulitis.  She was on Keflex and minocycline .  continues to improve   she readmitted with increased weakness and deconditioning  Has baseline limitations due to underlying MS  Also was noted  to have ongoing diarrhea  Workup was negative for C. Difficile  This has resolved  She has had recurrent hospitalizations and was admitted in January 25 for right lower extremity cellulitis with sepsis  It was felt she is not doing well and TCU recommended and patient now discharged to the TCU  Making progress and doing well.    Past Medical & Surgical History     PAST MEDICAL HISTORY:   Past Medical History:   Diagnosis Date     Ankle contracture, left      Class 3 severe obesity due to excess calories without serious comorbidity with body mass index (BMI) of 45.0 to 49.9 in adult (H)      Combined gastric and duodenal ulcer      Controlled substance agreement broken      Depression      Dyslipidemia      Edema      Edema      Gait abnormality      GERD (gastroesophageal reflux disease)      Gout      Lymphedema of both lower extremities      Melanoma (H)     left upper arm     MS (multiple sclerosis) (H)      RLS (restless legs syndrome)      Skin ulcer of left lower leg (H)      Valgus deformity of both feet      Vitamin D deficiency       PAST SURGICAL HISTORY:   has a past surgical history that includes Mammoplasty reduction (Bilateral); Cosmetic surgery (1988); Esophagoscopy, gastroscopy, duodenoscopy (EGD), combined (N/A, 03/30/2015); Spine surgery; Ablation Saphenous Vein W/ Rfa (Right, 04/12/2021); Mohs micrographic procedure; PICC/Midline Placement (8/13/2021); PICC/Midline Placement (9/2/2021); Irrigation and debridement lower extremity, combined (Right, 3/21/2022); Irrigation and debridement lower extremity, combined (Right, 3/28/2022); PICC/Midline Placement (7/21/2024); PICC/Midline Placement (9/8/2024); PICC/Midline Placement (1/14/2025); PICC/Midline Placement (3/20/2025); and Esophagoscopy, gastroscopy, duodenoscopy (EGD), combined (N/A, 3/21/2025).      Past Social History     Reviewed,  reports that she quit smoking about 49 years ago. Her smoking use included cigarettes. She started smoking about  61 years ago. She has a 3 pack-year smoking history. She has never used smokeless tobacco. She reports current alcohol use of about 1.0 standard drink of alcohol per week. She reports that she does not use drugs.    Family History     Reviewed, and family history includes Arthritis in her maternal grandmother, maternal uncle, paternal grandfather, paternal grandmother, and sister; Asthma in her sister; Brain Cancer in her father; Breast Cancer in her paternal aunt; Colon Cancer in her paternal aunt; Early Death in her maternal grandfather; Hyperlipidemia in her mother, paternal aunt, and sister; Hypertension in her mother, paternal aunt, and sister; Multiple Sclerosis in her mother and sister; Obesity in her sister; Uterine Cancer in her mother.    Medication List     Current Outpatient Medications   Medication Sig Dispense Refill     acetaminophen (TYLENOL) 500 MG tablet Take 1,000 mg by mouth every 6 hours as needed for pain.       ascorbic acid (VITAMIN C) 250 MG CHEW chewable tablet Take 250 mg by mouth daily.       buPROPion (WELLBUTRIN SR) 150 MG 12 hr tablet Take 150 mg by mouth every morning        Carboxymethylcellulose Sodium 0.25 % SOLN Apply 1 drop to eye daily as needed       citalopram (CELEXA) 40 MG tablet Take 40 mg by mouth daily (with lunch)       diclofenac (VOLTAREN) 1 % topical gel Apply 2 g topically 4 times daily as needed for moderate pain.       ferrous sulfate (FEROSUL) 325 (65 Fe) MG tablet Take 325 mg by mouth every other day.       furosemide (LASIX) 20 MG tablet Take 3 tablets (60 mg) by mouth daily. 30 tablet 1     gabapentin (NEURONTIN) 100 MG capsule Take 100 mg by mouth 3 times daily       HYDROmorphone (DILAUDID) 2 MG tablet Take 0.5-1 tablets (1-2 mg) by mouth every 6 hours as needed for moderate to severe pain. 15 tablet 0     lactobacillus rhamnosus, GG, (CULTURELL) capsule Take 1 capsule by mouth 2 times daily.       magnesium oxide (MAG-OX) 400 MG tablet Take 1 tablet by mouth  every morning       minocycline (DYNACIN) 50 MG tablet Take 1 tablet (50 mg) by mouth daily.       multivitamin w/minerals (CENTRUM ADULTS) tablet Take 1 tablet by mouth every morning       nitroGLYcerin (NITROSTAT) 0.4 MG sublingual tablet PLACE 1 TABLET UNDER TONGUE EVERY 5 MINTUES AS NEEDED FOR CHEST PAIN FOR UP TO 30 DAYS       nystatin (MYCOSTATIN) 571303 UNIT/GM external powder Apply topically 2 times daily as needed.       pantoprazole (PROTONIX) 40 MG EC tablet Take 1 tablet (40 mg) by mouth 2 times daily. 60 tablet 1     potassium chloride ER (K-TAB) 20 MEQ CR tablet Take 20 mEq by mouth 2 times daily.       rOPINIRole (REQUIP) 1 MG tablet Take 0.5 mg by mouth 2 times daily. Take one-half tablet (dose = 0.5 mg) twice daily. Once in the morning and once about dinnertime.     In addition, take one tablet (1 mg) at bedtime.       simvastatin (ZOCOR) 40 MG tablet [SIMVASTATIN (ZOCOR) 40 MG TABLET] Take 40 mg by mouth bedtime.       spironolactone (ALDACTONE) 25 MG tablet Take 25 mg by mouth every morning       vitamin B-Complex Take 1 tablet by mouth daily.       vitamin D3 (CHOLECALCIFEROL) 250 mcg (32423 units) capsule Take 1 capsule by mouth daily.       Wound Cleansers (VASHE WOUND THERAPY EX) Externally apply topically daily as needed       zinc 50 MG TABS Take 1 tablet by mouth three times a week. On Tues, Thur, Sat       No current facility-administered medications for this visit.      MED REC REQUIRED  Post Medication Reconciliation Status:        Allergies     Allergies   Allergen Reactions     Other Environmental Allergy Anaphylaxis     Bees/Wasps Stings  Bees/Wasps Stings     Adhesive Tape Other (See Comments)     Red,itchy and oozes  Red,itchy and oozes     Adhesive [Cyanoacrylate] Unknown     Other reaction(s): sores     Latex Hives     Ragweeds Itching     Oxycodone-Acetaminophen Rash     Vicodin [Hydrocodone-Acetaminophen] Nausea and Vomiting and Hives       Review of Systems   A comprehensive  review of 14 systems was done. Pertinent findings noted here and in history of present illness. All the rest negative.  Constitutional: Negative.  Negative for fever, chills, she has  activity change, appetite change and fatigue.   HENT: Negative for congestion and facial swelling.    Eyes: Negative for photophobia, redness and visual disturbance.   Respiratory: Negative for cough and chest tightness.    Cardiovascular: Negative for chest pain, palpitations and has chronic leg swelling.   Gastrointestinal: Negative for nausea, diarrhea, constipation, blood in stool and abdominal distention.   Genitourinary: Negative.    Musculoskeletal: has knee pain due to walking and PT  Skin: Negative.  Pruritus in her legs   Stasis dermatitis  Neurological: Negative for dizziness, tremors, syncope, weakness, light-headedness and headaches.   Hematological: Does not bruise/bleed easily.   Psychiatric/Behavioral: Negative.        Physical Exam   /61   Pulse 70   Temp 96.8  F (36  C)   Resp 20   Ht 1.524 m (5')   Wt 105.2 kg (232 lb)   SpO2 96%   BMI 45.31 kg/m       GENERAL: no acute distress. Cooperative in conversation.   obese  HEENT: pupils are equal, round and reactive. Oral mucosa is moist and intact.  RESP:Chest symmetric. Regular respiratory rate. No stridor.  CVS s1s2  ABD: Nondistended, soft.  EXTREMITIES: 3-+ lower extremity edema.   Has stasis dermatitis;OA on left leg  NEURO: non focal. Alert and oriented x3.   PSYCH: within normal limits. No depression or anxiety.  SKIN: warm dry intact           Lab Results     Last Comprehensive Metabolic Panel:  Lab Results   Component Value Date     03/28/2025    POTASSIUM 4.5 03/28/2025    CHLORIDE 98 03/28/2025    CO2 33 (H) 03/28/2025    ANIONGAP 7 03/28/2025    GLC 89 03/29/2025    BUN 13.3 03/28/2025    CR 1.07 (H) 03/28/2025    GFRESTIMATED 53 (L) 03/28/2025    JUNI 8.5 (L) 03/28/2025         Electronically signed by    Juany Steele MD                             Sincerely,        KELLEY Jensen    Electronically signed

## 2025-04-14 NOTE — PROGRESS NOTES
[facility] GERIATRIC SERVICES  Chief Complaint   Patient presents with    Discharge Summary [facility] Medical Record Number:  [id number]  Place of Service where encounter took place:  [facility] ([facility]) [[id number]]  Code Status:  Full Code     HISTORY:      HPI:  Elizabeth Kelley  is 79 year old (1945) undergoing physical and occupational therapy. She is with has PMHx of recurrent right lower extremity cellulitis, chronic lower extremity lymphedema, chronic venous stasis, chronic anemia, RLS, and HLD. She has a history of recurrent hospitalizations for lower extremity cellulitis.  Excerpted from records Patient recently was admitted 1/12 - 1/18/2025 for RIGHT lower extremity cellulitis and sepsis, treated with meropenem and vancomycin, and she was discharged on doxycycline.  She was again hospitalized 3/12 - 3/25/25 for LEFT lower extremity cellulitis. She was treated with Vanco and Zosyn/ceftriaxone and discharged with Keflex. She was recommended to be discharged to TCU, but she declined. She returned to the ER on 3/26/25 for inability to care for self at home and new onset diarrhea.     Today she is seen to review vital signs, and a face-to-face for discharge.  She will discharge to home on 4/17/2025 with current medications and treatments.  She will have home care services PT OT home health aide RN.   she does have a history of MS not on medications.  She denied chest pain shortness of breath cough congestion constipation or diarrhea.  She is with bilateral lower extremity cellulitis.   She completed Keflex and minocycline on 4/1/2025 and then will be on a lower dose of the minocycline indefinitely.  Wound care team to monitor lower extremity wounds and lymphedema wraps.  Hemoglobin  9.1 up from 8.3 she did receive blood transfusions in the hospital.  Per her report she lives with her significant other.      ALLERGIES:Other environmental allergy, Adhesive tape, Adhesive [cyanoacrylate],  Latex, Ragweeds, Oxycodone-acetaminophen, and Vicodin [hydrocodone-acetaminophen]    PAST MEDICAL HISTORY:   Past Medical History:   Diagnosis Date    Ankle contracture, left     Class 3 severe obesity due to excess calories without serious comorbidity with body mass index (BMI) of 45.0 to 49.9 in adult (H)     Combined gastric and duodenal ulcer     Controlled substance agreement broken     Depression     Dyslipidemia     Edema     Edema     Gait abnormality     GERD (gastroesophageal reflux disease)     Gout     Lymphedema of both lower extremities     Melanoma (H)     left upper arm    MS (multiple sclerosis) (H)     RLS (restless legs syndrome)     Skin ulcer of left lower leg (H)     Valgus deformity of both feet     Vitamin D deficiency        PAST SURGICAL HISTORY:   has a past surgical history that includes Mammoplasty reduction (Bilateral); Cosmetic surgery (1988); Esophagoscopy, gastroscopy, duodenoscopy (EGD), combined (N/A, 03/30/2015); Spine surgery; Ablation Saphenous Vein W/ Rfa (Right, 04/12/2021); Mohs micrographic procedure; PICC/Midline Placement (8/13/2021); PICC/Midline Placement (9/2/2021); Irrigation and debridement lower extremity, combined (Right, 3/21/2022); Irrigation and debridement lower extremity, combined (Right, 3/28/2022); PICC/Midline Placement (7/21/2024); PICC/Midline Placement (9/8/2024); PICC/Midline Placement (1/14/2025); PICC/Midline Placement (3/20/2025); and Esophagoscopy, gastroscopy, duodenoscopy (EGD), combined (N/A, 3/21/2025).    FAMILY HISTORY: family history includes Arthritis in her maternal grandmother, maternal uncle, paternal grandfather, paternal grandmother, and sister; Asthma in her sister; Brain Cancer in her father; Breast Cancer in her paternal aunt; Colon Cancer in her paternal aunt; Early Death in her maternal grandfather; Hyperlipidemia in her mother, paternal aunt, and sister; Hypertension in her mother, paternal aunt, and sister; Multiple Sclerosis in  her mother and sister; Obesity in her sister; Uterine Cancer in her mother.    SOCIAL HISTORY:  reports that she quit smoking about 49 years ago. Her smoking use included cigarettes. She started smoking about 61 years ago. She has a 3 pack-year smoking history. She has never used smokeless tobacco. She reports current alcohol use of about 1.0 standard drink of alcohol per week. She reports that she does not use drugs.    ROS:  Constitutional: Negative for activity change, appetite change, fatigue and fever.   HENT: Negative for congestion.    Respiratory: Negative for cough, shortness of breath and wheezing.    Cardiovascular: Negative for chest pain and positive leg swelling.   Gastrointestinal: Negative for abdominal distention, abdominal pain, constipation, diarrhea and nausea.  Recent diarrhea however reports bowel movements are now formed.  Genitourinary: Negative for dysuria.   Musculoskeletal: Negative for arthralgia. Negative for back pain.   Skin: Negative for color change and wound.  Bilateral lower extremity wounds dressing changes in place wound care team to follow  Neurological: Negative for dizziness.   Psychiatric/Behavioral: Negative for agitation, behavioral problems and confusion.     Physical Exam:  Constitutional:       Appearance: Patient is well-developed.   HENT:      Head: Normocephalic.   Eyes:      Conjunctiva/sclera: Conjunctivae normal.   Neck:      Musculoskeletal: Normal range of motion.   Cardiovascular:      Rate and Rhythm: Normal rate and regular rhythm.      Heart sounds: Normal heart sounds. No murmur.   Pulmonary:      Effort: No respiratory distress.      Breath sounds: Normal breath sounds. No wheezing or rales.   Abdominal:      General: Bowel sounds are normal. There is no distension.      Palpations: Abdomen is soft.      Tenderness: There is no abdominal tenderness.   Musculoskeletal:       Normal range of motion.  Lower extremity edema right leg greater than the left  wearing lower extremity lymphedema wraps  Skin:General:        Skin is warm.  Bilateral lower extremity cellulitis  Neurological:         Mental Status: Patient is alert and oriented to person, place, and time.   Psychiatric:         Behavior: Behavior normal.     Vitals:BP (!) 143/75   Pulse 74   Temp 97.5  F (36.4  C)   Resp 18   Ht 1.524 m (5')   Wt 104.2 kg (229 lb 12.8 oz)   SpO2 93%   BMI 44.88 kg/m   and Body mass index is 44.88 kg/m .    Lab/Diagnostic data:   No results found for this or any previous visit (from the past 240 hours).       MEDICATIONS:  MED REC REQUIRED  Post Medication Reconciliation Status: discharge medications reconciled, continue medications without change          Review of your medicines            Accurate as of April 15, 2025 11:31 AM. If you have any questions, ask your nurse or doctor.                CONTINUE these medicines which have NOT CHANGED        Dose / Directions   acetaminophen 500 MG tablet  Commonly known as: TYLENOL      Dose: 1,000 mg  Take 1,000 mg by mouth every 6 hours as needed for pain.  Refills: 0     ascorbic acid 250 MG Chew chewable tablet  Commonly known as: vitamin C      Dose: 250 mg  Take 250 mg by mouth daily.  Refills: 0     buPROPion 150 MG 12 hr tablet  Commonly known as: WELLBUTRIN SR      Dose: 150 mg  Take 150 mg by mouth every morning  Refills: 0     Carboxymethylcellulose Sodium 0.25 % Soln      Dose: 1 drop  Apply 1 drop to eye daily as needed  Refills: 0     citalopram 40 MG tablet  Commonly known as: celeXA      Dose: 40 mg  Take 40 mg by mouth daily (with lunch)  Refills: 0     diclofenac 1 % topical gel  Commonly known as: VOLTAREN      Dose: 2 g  Apply 2 g topically 4 times daily as needed for moderate pain.  Refills: 0     ferrous sulfate 325 (65 Fe) MG tablet  Commonly known as: FEROSUL      Dose: 325 mg  Take 325 mg by mouth every other day.  Refills: 0     furosemide 20 MG tablet  Commonly known as: LASIX  Used for: Cellulitis  of right lower leg [L03.115]      Dose: 60 mg  Take 3 tablets (60 mg) by mouth daily.  Quantity: 30 tablet  Refills: 1     gabapentin 100 MG capsule  Commonly known as: NEURONTIN      Dose: 100 mg  Take 100 mg by mouth 3 times daily  Refills: 0     HYDROmorphone 2 MG tablet  Commonly known as: DILAUDID  Used for: Cellulitis of left lower extremity [L03.116], Cellulitis of right lower leg [L03.115]      Dose: 1-2 mg  Take 0.5-1 tablets (1-2 mg) by mouth every 6 hours as needed for moderate to severe pain.  Quantity: 15 tablet  Refills: 0     lactobacillus rhamnosus (GG) capsule  Used for: Cellulitis of left lower extremity [L03.116], Cellulitis of right lower leg [L03.115]      Dose: 1 capsule  Take 1 capsule by mouth 2 times daily.  Refills: 0     magnesium oxide 400 MG tablet  Commonly known as: MAG-OX      Dose: 1 tablet  Take 1 tablet by mouth every morning  Refills: 0     minocycline 50 MG tablet  Commonly known as: DYNACIN  Used for: Cellulitis of right lower leg [L03.115]      Dose: 50 mg  Take 1 tablet (50 mg) by mouth daily.  Refills: 0     multivitamin w/minerals tablet      Dose: 1 tablet  Take 1 tablet by mouth every morning  Refills: 0     nitroGLYcerin 0.4 MG sublingual tablet  Commonly known as: NITROSTAT      PLACE 1 TABLET UNDER TONGUE EVERY 5 MINTUES AS NEEDED FOR CHEST PAIN FOR UP TO 30 DAYS  Refills: 0     nystatin 155794 UNIT/GM external powder  Commonly known as: MYCOSTATIN      Apply topically 2 times daily as needed.  Refills: 0     pantoprazole 40 MG EC tablet  Commonly known as: PROTONIX  Used for: Cellulitis of right lower leg [L03.115]      Dose: 40 mg  Take 1 tablet (40 mg) by mouth 2 times daily.  Quantity: 60 tablet  Refills: 1     potassium chloride ER 20 MEQ CR tablet  Commonly known as: K-TAB      Dose: 20 mEq  Take 20 mEq by mouth 2 times daily.  Refills: 0     rOPINIRole 1 MG tablet  Commonly known as: REQUIP      Dose: 0.5 mg  Take 0.5 mg by mouth 2 times daily. Take one-half  tablet (dose = 0.5 mg) twice daily. Once in the morning and once about dinnertime.     In addition, take one tablet (1 mg) at bedtime.  Refills: 0     simvastatin 40 MG tablet  Commonly known as: ZOCOR      Dose: 40 mg  [SIMVASTATIN (ZOCOR) 40 MG TABLET] Take 40 mg by mouth bedtime.  Refills: 0     spironolactone 25 MG tablet  Commonly known as: ALDACTONE      Dose: 25 mg  Take 25 mg by mouth every morning  Refills: 0     VASHE WOUND THERAPY EX      Externally apply topically daily as needed  Refills: 0     vitamin B-Complex      Dose: 1 tablet  Take 1 tablet by mouth daily.  Refills: 0     vitamin D3 250 mcg (33979 units) capsule  Commonly known as: CHOLECALCIFEROL      Dose: 1 capsule  Take 1 capsule by mouth daily.  Refills: 0     zinc 50 MG Tabs      Dose: 1 tablet  Take 1 tablet by mouth three times a week. On Tues, Thur, Sat  Refills: 0              ASSESSMENT/PLAN  Encounter Diagnoses   Name Primary?    Cellulitis of lower extremity, unspecified laterality Yes    Physical deconditioning     Diarrhea, unspecified type          Twitches intermittent on medications for restless leg syndrome declined an increase in these meds denied the twitches affecting her sleep and recommended she update her neurologist    Physical deconditioning She will have home care services PT/OT/HHA/RN    Pain management as needed Tylenol daily, Dilaudid 1 to 2 mg every 6 hours as needed    GERD on Protonix    Cellulitis bilateral lower extremities dressing changes as ordered, completed minocycline and Keflex on 4/1/2025 and then  she will be on a lower dose minocycline indefinitely    Restless leg syndrome continue ropinirole    Mood disorder on bupropion, Celexa 40 mg daily    Anemia on ferrous sulfate daily monitor labs hemoglobin on 3/31/2025 was 9.1 up from 8.3    Edema continue Lasix 20 mg daily    Neuropathy continue gabapentin    CHF Daily weights, continue Lasix spironolactone      DISCHARGE PLAN/FACE TO FACE:  I certify that  services are/were furnished while this patient was under the care of a physician and that a physician or an allowed non-physician practitioner (NPP), had a face-to-face encounter that meets the physician face-to-face encounter requirements. The encounter was in whole, or in part, related to the primary reason for home health. The patient is confined to his/her home and needs intermittent skilled nursing, physical therapy, speech-language pathology, or the continued need for occupational therapy. A plan of care has been established by a physician and is periodically reviewed by a physician.  Date of Face-to-Face Encounter: 4/15/2025    I certify that, based on my findings, the following services are medically necessary home health services: PT OT home health aide RN    My clinical findings support the need for the above skilled services because: PT OT for continued strength and endurance, lymphedema wraps lower extremities, home health aide to assist with activities of daily living, RN for vital signs, medication management and lower extremity dressing changes coordinated with the lymphedema wraps    This patient is homebound because: She is deconditioned following sepsis and lower extremity cellulitis.  She is also with lymphedema wraps lower extremities making it difficult for her to ambulate long distances.  She will continue home therapy for strengthening therefore making it taxing to leave the home unassisted.    The patient is, or has been, under my care and I have initiated the establishment of the plan of care. This patient will be followed by a physician who will periodically review the plan of care.    Schedule follow up visit with primary care provider within 7 days to reestablish care.    Electronically signed by: Rosario Styles CNP

## 2025-04-15 ENCOUNTER — TRANSITIONAL CARE UNIT VISIT (OUTPATIENT)
Dept: GERIATRICS | Facility: CLINIC | Age: 80
End: 2025-04-15
Payer: COMMERCIAL

## 2025-04-15 VITALS
RESPIRATION RATE: 18 BRPM | BODY MASS INDEX: 45.12 KG/M2 | TEMPERATURE: 97.5 F | HEIGHT: 60 IN | OXYGEN SATURATION: 93 % | WEIGHT: 229.8 LBS | SYSTOLIC BLOOD PRESSURE: 143 MMHG | HEART RATE: 74 BPM | DIASTOLIC BLOOD PRESSURE: 75 MMHG

## 2025-04-15 DIAGNOSIS — R53.81 PHYSICAL DECONDITIONING: ICD-10-CM

## 2025-04-15 DIAGNOSIS — L03.119 CELLULITIS OF LOWER EXTREMITY, UNSPECIFIED LATERALITY: Primary | ICD-10-CM

## 2025-04-15 DIAGNOSIS — R19.7 DIARRHEA, UNSPECIFIED TYPE: ICD-10-CM

## 2025-04-15 PROCEDURE — 99316 NF DSCHRG MGMT 30 MIN+: CPT | Performed by: NURSE PRACTITIONER

## 2025-04-15 NOTE — LETTER
4/15/2025      Elizabeth Kelley  [address]  [facility] [address]        [facility]  Chief Complaint   Patient presents with     Discharge Summary [facility] Medical Record Number:  [id number]  Place of Service where encounter took place:  [facility] ([facility] [[id number]]  Code Status:  Full Code     HISTORY:      HPI:  Elizabeth Kelley  is 79 year old (1945) undergoing physical and occupational therapy. She is with has PMHx of recurrent right lower extremity cellulitis, chronic lower extremity lymphedema, chronic venous stasis, chronic anemia, RLS, and HLD. She has a history of recurrent hospitalizations for lower extremity cellulitis.  Excerpted from records Patient recently was admitted 1/12 - 1/18/2025 for RIGHT lower extremity cellulitis and sepsis, treated with meropenem and vancomycin, and she was discharged on doxycycline.  She was again hospitalized 3/12 - 3/25/25 for LEFT lower extremity cellulitis. She was treated with Vanco and Zosyn/ceftriaxone and discharged with Keflex. She was recommended to be discharged to TCU, but she declined. She returned to the ER on 3/26/25 for inability to care for self at home and new onset diarrhea.     Today she is seen to review vital signs, and a face-to-face for discharge.  She will discharge to home on 4/17/2025 with current medications and treatments.  She will have home care services PT OT home health aide RN.   she does have a history of MS not on medications.  She denied chest pain shortness of breath cough congestion constipation or diarrhea.  She is with bilateral lower extremity cellulitis.   She completed Keflex and minocycline on 4/1/2025 and then will be on a lower dose of the minocycline indefinitely.  Wound care team to monitor lower extremity wounds and lymphedema wraps.  Hemoglobin  9.1 up from 8.3 she did receive blood transfusions in the hospital.  Per her report she lives with her significant  other.      ALLERGIES:Other environmental allergy, Adhesive tape, Adhesive [cyanoacrylate], Latex, Ragweeds, Oxycodone-acetaminophen, and Vicodin [hydrocodone-acetaminophen]    PAST MEDICAL HISTORY:   Past Medical History:   Diagnosis Date     Ankle contracture, left      Class 3 severe obesity due to excess calories without serious comorbidity with body mass index (BMI) of 45.0 to 49.9 in adult (H)      Combined gastric and duodenal ulcer      Controlled substance agreement broken      Depression      Dyslipidemia      Edema      Edema      Gait abnormality      GERD (gastroesophageal reflux disease)      Gout      Lymphedema of both lower extremities      Melanoma (H)     left upper arm     MS (multiple sclerosis) (H)      RLS (restless legs syndrome)      Skin ulcer of left lower leg (H)      Valgus deformity of both feet      Vitamin D deficiency        PAST SURGICAL HISTORY:   has a past surgical history that includes Mammoplasty reduction (Bilateral); Cosmetic surgery (1988); Esophagoscopy, gastroscopy, duodenoscopy (EGD), combined (N/A, 03/30/2015); Spine surgery; Ablation Saphenous Vein W/ Rfa (Right, 04/12/2021); Mohs micrographic procedure; PICC/Midline Placement (8/13/2021); PICC/Midline Placement (9/2/2021); Irrigation and debridement lower extremity, combined (Right, 3/21/2022); Irrigation and debridement lower extremity, combined (Right, 3/28/2022); PICC/Midline Placement (7/21/2024); PICC/Midline Placement (9/8/2024); PICC/Midline Placement (1/14/2025); PICC/Midline Placement (3/20/2025); and Esophagoscopy, gastroscopy, duodenoscopy (EGD), combined (N/A, 3/21/2025).    FAMILY HISTORY: family history includes Arthritis in her maternal grandmother, maternal uncle, paternal grandfather, paternal grandmother, and sister; Asthma in her sister; Brain Cancer in her father; Breast Cancer in her paternal aunt; Colon Cancer in her paternal aunt; Early Death in her maternal grandfather; Hyperlipidemia in her  mother, paternal aunt, and sister; Hypertension in her mother, paternal aunt, and sister; Multiple Sclerosis in her mother and sister; Obesity in her sister; Uterine Cancer in her mother.    SOCIAL HISTORY:  reports that she quit smoking about 49 years ago. Her smoking use included cigarettes. She started smoking about 61 years ago. She has a 3 pack-year smoking history. She has never used smokeless tobacco. She reports current alcohol use of about 1.0 standard drink of alcohol per week. She reports that she does not use drugs.    ROS:  Constitutional: Negative for activity change, appetite change, fatigue and fever.   HENT: Negative for congestion.    Respiratory: Negative for cough, shortness of breath and wheezing.    Cardiovascular: Negative for chest pain and positive leg swelling.   Gastrointestinal: Negative for abdominal distention, abdominal pain, constipation, diarrhea and nausea.  Recent diarrhea however reports bowel movements are now formed.  Genitourinary: Negative for dysuria.   Musculoskeletal: Negative for arthralgia. Negative for back pain.   Skin: Negative for color change and wound.  Bilateral lower extremity wounds dressing changes in place wound care team to follow  Neurological: Negative for dizziness.   Psychiatric/Behavioral: Negative for agitation, behavioral problems and confusion.     Physical Exam:  Constitutional:       Appearance: Patient is well-developed.   HENT:      Head: Normocephalic.   Eyes:      Conjunctiva/sclera: Conjunctivae normal.   Neck:      Musculoskeletal: Normal range of motion.   Cardiovascular:      Rate and Rhythm: Normal rate and regular rhythm.      Heart sounds: Normal heart sounds. No murmur.   Pulmonary:      Effort: No respiratory distress.      Breath sounds: Normal breath sounds. No wheezing or rales.   Abdominal:      General: Bowel sounds are normal. There is no distension.      Palpations: Abdomen is soft.      Tenderness: There is no abdominal  tenderness.   Musculoskeletal:       Normal range of motion.  Lower extremity edema right leg greater than the left wearing lower extremity lymphedema wraps  Skin:General:        Skin is warm.  Bilateral lower extremity cellulitis  Neurological:         Mental Status: Patient is alert and oriented to person, place, and time.   Psychiatric:         Behavior: Behavior normal.     Vitals:BP (!) 143/75   Pulse 74   Temp 97.5  F (36.4  C)   Resp 18   Ht 1.524 m (5')   Wt 104.2 kg (229 lb 12.8 oz)   SpO2 93%   BMI 44.88 kg/m   and Body mass index is 44.88 kg/m .    Lab/Diagnostic data:   No results found for this or any previous visit (from the past 240 hours).       MEDICATIONS:  MED REC REQUIRED  Post Medication Reconciliation Status: discharge medications reconciled, continue medications without change          Review of your medicines            Accurate as of April 15, 2025 11:31 AM. If you have any questions, ask your nurse or doctor.                CONTINUE these medicines which have NOT CHANGED        Dose / Directions   acetaminophen 500 MG tablet  Commonly known as: TYLENOL      Dose: 1,000 mg  Take 1,000 mg by mouth every 6 hours as needed for pain.  Refills: 0     ascorbic acid 250 MG Chew chewable tablet  Commonly known as: vitamin C      Dose: 250 mg  Take 250 mg by mouth daily.  Refills: 0     buPROPion 150 MG 12 hr tablet  Commonly known as: WELLBUTRIN SR      Dose: 150 mg  Take 150 mg by mouth every morning  Refills: 0     Carboxymethylcellulose Sodium 0.25 % Soln      Dose: 1 drop  Apply 1 drop to eye daily as needed  Refills: 0     citalopram 40 MG tablet  Commonly known as: celeXA      Dose: 40 mg  Take 40 mg by mouth daily (with lunch)  Refills: 0     diclofenac 1 % topical gel  Commonly known as: VOLTAREN      Dose: 2 g  Apply 2 g topically 4 times daily as needed for moderate pain.  Refills: 0     ferrous sulfate 325 (65 Fe) MG tablet  Commonly known as: FEROSUL      Dose: 325 mg  Take 325 mg  by mouth every other day.  Refills: 0     furosemide 20 MG tablet  Commonly known as: LASIX  Used for: Cellulitis of right lower leg [L03.115]      Dose: 60 mg  Take 3 tablets (60 mg) by mouth daily.  Quantity: 30 tablet  Refills: 1     gabapentin 100 MG capsule  Commonly known as: NEURONTIN      Dose: 100 mg  Take 100 mg by mouth 3 times daily  Refills: 0     HYDROmorphone 2 MG tablet  Commonly known as: DILAUDID  Used for: Cellulitis of left lower extremity [L03.116], Cellulitis of right lower leg [L03.115]      Dose: 1-2 mg  Take 0.5-1 tablets (1-2 mg) by mouth every 6 hours as needed for moderate to severe pain.  Quantity: 15 tablet  Refills: 0     lactobacillus rhamnosus (GG) capsule  Used for: Cellulitis of left lower extremity [L03.116], Cellulitis of right lower leg [L03.115]      Dose: 1 capsule  Take 1 capsule by mouth 2 times daily.  Refills: 0     magnesium oxide 400 MG tablet  Commonly known as: MAG-OX      Dose: 1 tablet  Take 1 tablet by mouth every morning  Refills: 0     minocycline 50 MG tablet  Commonly known as: DYNACIN  Used for: Cellulitis of right lower leg [L03.115]      Dose: 50 mg  Take 1 tablet (50 mg) by mouth daily.  Refills: 0     multivitamin w/minerals tablet      Dose: 1 tablet  Take 1 tablet by mouth every morning  Refills: 0     nitroGLYcerin 0.4 MG sublingual tablet  Commonly known as: NITROSTAT      PLACE 1 TABLET UNDER TONGUE EVERY 5 MINTUES AS NEEDED FOR CHEST PAIN FOR UP TO 30 DAYS  Refills: 0     nystatin 284051 UNIT/GM external powder  Commonly known as: MYCOSTATIN      Apply topically 2 times daily as needed.  Refills: 0     pantoprazole 40 MG EC tablet  Commonly known as: PROTONIX  Used for: Cellulitis of right lower leg [L03.115]      Dose: 40 mg  Take 1 tablet (40 mg) by mouth 2 times daily.  Quantity: 60 tablet  Refills: 1     potassium chloride ER 20 MEQ CR tablet  Commonly known as: K-TAB      Dose: 20 mEq  Take 20 mEq by mouth 2 times daily.  Refills: 0      rOPINIRole 1 MG tablet  Commonly known as: REQUIP      Dose: 0.5 mg  Take 0.5 mg by mouth 2 times daily. Take one-half tablet (dose = 0.5 mg) twice daily. Once in the morning and once about dinnertime.     In addition, take one tablet (1 mg) at bedtime.  Refills: 0     simvastatin 40 MG tablet  Commonly known as: ZOCOR      Dose: 40 mg  [SIMVASTATIN (ZOCOR) 40 MG TABLET] Take 40 mg by mouth bedtime.  Refills: 0     spironolactone 25 MG tablet  Commonly known as: ALDACTONE      Dose: 25 mg  Take 25 mg by mouth every morning  Refills: 0     VASHE WOUND THERAPY EX      Externally apply topically daily as needed  Refills: 0     vitamin B-Complex      Dose: 1 tablet  Take 1 tablet by mouth daily.  Refills: 0     vitamin D3 250 mcg (23364 units) capsule  Commonly known as: CHOLECALCIFEROL      Dose: 1 capsule  Take 1 capsule by mouth daily.  Refills: 0     zinc 50 MG Tabs      Dose: 1 tablet  Take 1 tablet by mouth three times a week. On Tues, Thur, Sat  Refills: 0              ASSESSMENT/PLAN  Encounter Diagnoses   Name Primary?     Cellulitis of lower extremity, unspecified laterality Yes     Physical deconditioning      Diarrhea, unspecified type          Twitches intermittent on medications for restless leg syndrome declined an increase in these meds denied the twitches affecting her sleep and recommended she update her neurologist    Physical deconditioning She will have home care services PT/OT/HHA/RN    Pain management as needed Tylenol daily, Dilaudid 1 to 2 mg every 6 hours as needed    GERD on Protonix    Cellulitis bilateral lower extremities dressing changes as ordered, completed minocycline and Keflex on 4/1/2025 and then  she will be on a lower dose minocycline indefinitely    Restless leg syndrome continue ropinirole    Mood disorder on bupropion, Celexa 40 mg daily    Anemia on ferrous sulfate daily monitor labs hemoglobin on 3/31/2025 was 9.1 up from 8.3    Edema continue Lasix 20 mg daily    Neuropathy  continue gabapentin    CHF Daily weights, continue Lasix spironolactone      DISCHARGE PLAN/FACE TO FACE:  I certify that services are/were furnished while this patient was under the care of a physician and that a physician or an allowed non-physician practitioner (NPP), had a face-to-face encounter that meets the physician face-to-face encounter requirements. The encounter was in whole, or in part, related to the primary reason for home health. The patient is confined to his/her home and needs intermittent skilled nursing, physical therapy, speech-language pathology, or the continued need for occupational therapy. A plan of care has been established by a physician and is periodically reviewed by a physician.  Date of Face-to-Face Encounter: 4/15/2025    I certify that, based on my findings, the following services are medically necessary home health services: PT OT home health aide RN    My clinical findings support the need for the above skilled services because: PT OT for continued strength and endurance, lymphedema wraps lower extremities, home health aide to assist with activities of daily living, RN for vital signs, medication management and lower extremity dressing changes coordinated with the lymphedema wraps    This patient is homebound because: She is deconditioned following sepsis and lower extremity cellulitis.  She is also with lymphedema wraps lower extremities making it difficult for her to ambulate long distances.  She will continue home therapy for strengthening therefore making it taxing to leave the home unassisted.    The patient is, or has been, under my care and I have initiated the establishment of the plan of care. This patient will be followed by a physician who will periodically review the plan of care.    Schedule follow up visit with primary care provider within 7 days to reestablish care.    Electronically signed by: Rosario Styles CNP       Sincerely,        Rosario Styles  CNP    Electronically signed

## 2025-04-17 ENCOUNTER — TRANSITIONAL CARE UNIT VISIT (OUTPATIENT)
Dept: GERIATRICS | Facility: CLINIC | Age: 80
End: 2025-04-17
Payer: COMMERCIAL

## 2025-04-17 VITALS
HEIGHT: 60 IN | HEART RATE: 77 BPM | TEMPERATURE: 97.5 F | SYSTOLIC BLOOD PRESSURE: 148 MMHG | RESPIRATION RATE: 18 BRPM | BODY MASS INDEX: 45.16 KG/M2 | OXYGEN SATURATION: 95 % | WEIGHT: 230 LBS | DIASTOLIC BLOOD PRESSURE: 70 MMHG

## 2025-04-17 DIAGNOSIS — R21 RASH: Primary | ICD-10-CM

## 2025-04-17 RX ORDER — TRIAMCINOLONE ACETONIDE 1 MG/G
CREAM TOPICAL 2 TIMES DAILY
COMMUNITY
End: 2025-04-17

## 2025-04-17 RX ORDER — TRIAMCINOLONE ACETONIDE 1 MG/G
CREAM TOPICAL 2 TIMES DAILY
Qty: 80 G | Refills: 0 | Status: SHIPPED | OUTPATIENT
Start: 2025-04-17 | End: 2025-04-24

## 2025-04-17 NOTE — PROGRESS NOTES
[facility] GERIATRIC SERVICES  Chief Complaint   Patient presents with    JORGE     Exline Medical Record Number:  [id number]  Place of Service where encounter took place:  [facility]) [[id number]]  Code Status:  Full Code     HISTORY:      HPI:  Elizabeth Kelley  is 79 year old (1945) undergoing physical and occupational therapy. She is with has PMHx of recurrent right lower extremity cellulitis, chronic lower extremity lymphedema, chronic venous stasis, chronic anemia, RLS, and HLD. She has a history of recurrent hospitalizations for lower extremity cellulitis.  Excerpted from records Patient recently was admitted 1/12 - 1/18/2025 for RIGHT lower extremity cellulitis and sepsis, treated with meropenem and vancomycin, and she was discharged on doxycycline.  She was again hospitalized 3/12 - 3/25/25 for LEFT lower extremity cellulitis. She was treated with Vanco and Zosyn/ceftriaxone and discharged with Keflex. She was recommended to be discharged to TCU, but she declined. She returned to the ER on 3/26/25 for inability to care for self at home and new onset diarrhea.     Today she is seen to review vital signs  and for reports of a rash left shoulder.  Noted to have a rash left shoulder in the.  To be resolving.  She reports she noticed it a couple of days ago and it is very itchy.  She did not have the rash noted anywhere else on her body.  She will be discharging to home today therefore triamcinolone was ordered to her home pharmacy and she is aware she needs to pick it up at [facility] in [place].  She does have a history of MS not on medications.  She denied chest pain shortness of breath cough congestion constipation or diarrhea.  She is with bilateral lower extremity cellulitis.   She completed Keflex and minocycline on 4/1/2025 and then will be on a lower dose of the minocycline indefinitely.  Wound care team to monitor lower extremity wounds and lymphedema wraps.  Hemoglobin  9.1 up from  8.3 she did receive blood transfusions in the hospital.  Per her report she lives with her significant other.      ALLERGIES:Other environmental allergy, Adhesive tape, Adhesive [cyanoacrylate], Latex, Ragweeds, Oxycodone-acetaminophen, and Vicodin [hydrocodone-acetaminophen]    PAST MEDICAL HISTORY:   Past Medical History:   Diagnosis Date    Ankle contracture, left     Class 3 severe obesity due to excess calories without serious comorbidity with body mass index (BMI) of 45.0 to 49.9 in adult (H)     Combined gastric and duodenal ulcer     Controlled substance agreement broken     Depression     Dyslipidemia     Edema     Edema     Gait abnormality     GERD (gastroesophageal reflux disease)     Gout     Lymphedema of both lower extremities     Melanoma (H)     left upper arm    MS (multiple sclerosis) (H)     RLS (restless legs syndrome)     Skin ulcer of left lower leg (H)     Valgus deformity of both feet     Vitamin D deficiency        PAST SURGICAL HISTORY:   has a past surgical history that includes Mammoplasty reduction (Bilateral); Cosmetic surgery (1988); Esophagoscopy, gastroscopy, duodenoscopy (EGD), combined (N/A, 03/30/2015); Spine surgery; Ablation Saphenous Vein W/ Rfa (Right, 04/12/2021); Mohs micrographic procedure; PICC/Midline Placement (8/13/2021); PICC/Midline Placement (9/2/2021); Irrigation and debridement lower extremity, combined (Right, 3/21/2022); Irrigation and debridement lower extremity, combined (Right, 3/28/2022); PICC/Midline Placement (7/21/2024); PICC/Midline Placement (9/8/2024); PICC/Midline Placement (1/14/2025); PICC/Midline Placement (3/20/2025); and Esophagoscopy, gastroscopy, duodenoscopy (EGD), combined (N/A, 3/21/2025).    FAMILY HISTORY: family history includes Arthritis in her maternal grandmother, maternal uncle, paternal grandfather, paternal grandmother, and sister; Asthma in her sister; Brain Cancer in her father; Breast Cancer in her paternal aunt; Colon Cancer in  her paternal aunt; Early Death in her maternal grandfather; Hyperlipidemia in her mother, paternal aunt, and sister; Hypertension in her mother, paternal aunt, and sister; Multiple Sclerosis in her mother and sister; Obesity in her sister; Uterine Cancer in her mother.    SOCIAL HISTORY:  reports that she quit smoking about 49 years ago. Her smoking use included cigarettes. She started smoking about 61 years ago. She has a 3 pack-year smoking history. She has never used smokeless tobacco. She reports current alcohol use of about 1.0 standard drink of alcohol per week. She reports that she does not use drugs.    ROS:  Constitutional: Negative for activity change, appetite change, fatigue and fever.   HENT: Negative for congestion.    Respiratory: Negative for cough, shortness of breath and wheezing.    Cardiovascular: Negative for chest pain and positive leg swelling.   Gastrointestinal: Negative for abdominal distention, abdominal pain, constipation, diarrhea and nausea.  Recent diarrhea however reports bowel movements are now formed.  Genitourinary: Negative for dysuria.   Musculoskeletal: Negative for arthralgia. Negative for back pain.   Skin: Negative for color change and wound.  Bilateral lower extremity wounds dressing changes in place wound care team to follow  Neurological: Negative for dizziness.   Psychiatric/Behavioral: Negative for agitation, behavioral problems and confusion.     Physical Exam:  Constitutional:       Appearance: Patient is well-developed.   HENT:      Head: Normocephalic.   Eyes:      Conjunctiva/sclera: Conjunctivae normal.   Neck:      Musculoskeletal: Normal range of motion.   Cardiovascular:      Rate and Rhythm: Normal rate and regular rhythm.      Heart sounds: Normal heart sounds. No murmur.   Pulmonary:      Effort: No respiratory distress.      Breath sounds: Normal breath sounds. No wheezing or rales.   Abdominal:      General: Bowel sounds are normal. There is no distension.       Palpations: Abdomen is soft.      Tenderness: There is no abdominal tenderness.   Musculoskeletal:       Normal range of motion.  Lower extremity edema right leg greater than the left wearing lower extremity lymphedema wraps  Skin:General:        Skin is warm.  Bilateral lower extremity cellulitis, rash left shoulder  Neurological:         Mental Status: Patient is alert and oriented to person, place, and time.   Psychiatric:         Behavior: Behavior normal.     Vitals:BP (!) 148/70   Pulse 77   Temp 97.5  F (36.4  C)   Resp 18   Ht 1.524 m (5')   Wt 104.3 kg (230 lb)   SpO2 95%   BMI 44.92 kg/m   and Body mass index is 44.92 kg/m .    Lab/Diagnostic data:   No results found for this or any previous visit (from the past 240 hours).       MEDICATIONS:  MED REC REQUIRED  Post Medication Reconciliation Status: discharge medications reconciled, continue medications without change          Review of your medicines            Accurate as of April 17, 2025 10:36 AM. If you have any questions, ask your nurse or doctor.                CONTINUE these medicines which may have CHANGED, or have new prescriptions. If we are uncertain of the size of tablets/capsules you have at home, strength may be listed as something that might have changed.        Dose / Directions   triamcinolone 0.1 % external cream  Commonly known as: KENALOG  This may have changed: additional instructions  Used for: Rash  Changed by: Lucia BELCHER      Apply topically 2 times daily for 7 days. Apply to left shoulder  Quantity: 80 g  Refills: 0            CONTINUE these medicines which have NOT CHANGED        Dose / Directions   acetaminophen 500 MG tablet  Commonly known as: TYLENOL      Dose: 1,000 mg  Take 1,000 mg by mouth every 6 hours as needed for pain.  Refills: 0     ascorbic acid 250 MG Chew chewable tablet  Commonly known as: vitamin C      Dose: 250 mg  Take 250 mg by mouth daily.  Refills: 0     buPROPion 150 MG 12 hr tablet  Commonly  known as: WELLBUTRIN SR      Dose: 150 mg  Take 150 mg by mouth every morning  Refills: 0     Carboxymethylcellulose Sodium 0.25 % Soln      Dose: 1 drop  Apply 1 drop to eye daily as needed  Refills: 0     citalopram 40 MG tablet  Commonly known as: celeXA      Dose: 40 mg  Take 40 mg by mouth daily (with lunch)  Refills: 0     diclofenac 1 % topical gel  Commonly known as: VOLTAREN      Dose: 2 g  Apply 2 g topically 4 times daily as needed for moderate pain.  Refills: 0     ferrous sulfate 325 (65 Fe) MG tablet  Commonly known as: FEROSUL      Dose: 325 mg  Take 325 mg by mouth every other day.  Refills: 0     furosemide 20 MG tablet  Commonly known as: LASIX  Used for: Cellulitis of right lower leg [L03.115]      Dose: 60 mg  Take 3 tablets (60 mg) by mouth daily.  Quantity: 30 tablet  Refills: 1     gabapentin 100 MG capsule  Commonly known as: NEURONTIN      Dose: 100 mg  Take 100 mg by mouth 3 times daily  Refills: 0     HYDROmorphone 2 MG tablet  Commonly known as: DILAUDID  Used for: Cellulitis of left lower extremity [L03.116], Cellulitis of right lower leg [L03.115]      Dose: 1-2 mg  Take 0.5-1 tablets (1-2 mg) by mouth every 6 hours as needed for moderate to severe pain.  Quantity: 15 tablet  Refills: 0     lactobacillus rhamnosus (GG) capsule  Used for: Cellulitis of left lower extremity [L03.116], Cellulitis of right lower leg [L03.115]      Dose: 1 capsule  Take 1 capsule by mouth 2 times daily.  Refills: 0     magnesium oxide 400 MG tablet  Commonly known as: MAG-OX      Dose: 1 tablet  Take 1 tablet by mouth every morning  Refills: 0     minocycline 50 MG tablet  Commonly known as: DYNACIN  Used for: Cellulitis of right lower leg [L03.115]      Dose: 50 mg  Take 1 tablet (50 mg) by mouth daily.  Refills: 0     multivitamin w/minerals tablet      Dose: 1 tablet  Take 1 tablet by mouth every morning  Refills: 0     nitroGLYcerin 0.4 MG sublingual tablet  Commonly known as: NITROSTAT      PLACE 1  TABLET UNDER TONGUE EVERY 5 MINTUES AS NEEDED FOR CHEST PAIN FOR UP TO 30 DAYS  Refills: 0     nystatin 140234 UNIT/GM external powder  Commonly known as: MYCOSTATIN      Apply topically 2 times daily as needed.  Refills: 0     pantoprazole 40 MG EC tablet  Commonly known as: PROTONIX  Used for: Cellulitis of right lower leg [L03.115]      Dose: 40 mg  Take 1 tablet (40 mg) by mouth 2 times daily.  Quantity: 60 tablet  Refills: 1     potassium chloride ER 20 MEQ CR tablet  Commonly known as: K-TAB      Dose: 20 mEq  Take 20 mEq by mouth 2 times daily.  Refills: 0     rOPINIRole 1 MG tablet  Commonly known as: REQUIP      Dose: 0.5 mg  Take 0.5 mg by mouth 2 times daily. Take one-half tablet (dose = 0.5 mg) twice daily. Once in the morning and once about dinnertime.     In addition, take one tablet (1 mg) at bedtime.  Refills: 0     simvastatin 40 MG tablet  Commonly known as: ZOCOR      Dose: 40 mg  [SIMVASTATIN (ZOCOR) 40 MG TABLET] Take 40 mg by mouth bedtime.  Refills: 0     spironolactone 25 MG tablet  Commonly known as: ALDACTONE      Dose: 25 mg  Take 25 mg by mouth every morning  Refills: 0     VASHE WOUND THERAPY EX      Externally apply topically daily as needed  Refills: 0     vitamin B-Complex      Dose: 1 tablet  Take 1 tablet by mouth daily.  Refills: 0     vitamin D3 250 mcg (57983 units) capsule  Commonly known as: CHOLECALCIFEROL      Dose: 1 capsule  Take 1 capsule by mouth daily.  Refills: 0     zinc 50 MG Tabs      Dose: 1 tablet  Take 1 tablet by mouth three times a week. On Tues, Thur, Sat  Refills: 0               Where to get your medicines        These medications were sent to [facility]/pharmacy #[id number] - [place], [place] - [address]  [address] [facility] [address] [facility] [address], [facility] [address]      Phone: [phone]   triamcinolone 0.1 % external cream         ASSESSMENT/PLAN  Encounter Diagnosis   Name Primary?    Rash Yes     Rash left shoulder triamcinolone cream twice daily x 1 week    Twitches  intermittent on medications for restless leg syndrome declined an increase in these meds denied the twitches affecting her sleep and recommended she update her neurologist    Physical deconditioning She will have home care services PT/OT/HHA/RN    Pain management as needed Tylenol daily, Dilaudid 1 to 2 mg every 6 hours as needed    GERD on Protonix    Cellulitis bilateral lower extremities dressing changes as ordered, completed minocycline and Keflex on 4/1/2025 and then  she will be on a lower dose minocycline indefinitely    Restless leg syndrome continue ropinirole    Mood disorder on bupropion, Celexa 40 mg daily    Anemia on ferrous sulfate daily monitor labs hemoglobin on 3/31/2025 was 9.1 up from 8.3    Edema continue Lasix 20 mg daily    Neuropathy continue gabapentin    CHF Daily weights, continue Lasix spironolactone      Electronically signed by: Rosario Styles CNP

## 2025-04-17 NOTE — LETTER
4/17/2025      Elizabeth Kelley  [address]  [place] [address]        [facility]  Chief Complaint   Patient presents with     [facility] Medical Record Number:  [id number]  Place of Service where encounter took place:  [facility] ([facility]) [[id number]]  Code Status:  Full Code     HISTORY:      HPI:  Elizabeth Kelley  is 79 year old (1945) undergoing physical and occupational therapy. She is with has PMHx of recurrent right lower extremity cellulitis, chronic lower extremity lymphedema, chronic venous stasis, chronic anemia, RLS, and HLD. She has a history of recurrent hospitalizations for lower extremity cellulitis.  Excerpted from records Patient recently was admitted 1/12 - 1/18/2025 for RIGHT lower extremity cellulitis and sepsis, treated with meropenem and vancomycin, and she was discharged on doxycycline.  She was again hospitalized 3/12 - 3/25/25 for LEFT lower extremity cellulitis. She was treated with Vanco and Zosyn/ceftriaxone and discharged with Keflex. She was recommended to be discharged to TCU, but she declined. She returned to the ER on 3/26/25 for inability to care for self at home and new onset diarrhea.     Today she is seen to review vital signs  and for reports of a rash left shoulder.  Noted to have a rash left shoulder in the.  To be resolving.  She reports she noticed it a couple of days ago and it is very itchy.  She did not have the rash noted anywhere else on her body.  She will be discharging to home today therefore triamcinolone was ordered to her home pharmacy and she is aware she needs to pick it up at [facility] in [place].  She does have a history of MS not on medications.  She denied chest pain shortness of breath cough congestion constipation or diarrhea.  She is with bilateral lower extremity cellulitis.   She completed Keflex and minocycline on 4/1/2025 and then will be on a lower dose of the minocycline indefinitely.   Wound care team to monitor lower extremity wounds and lymphedema wraps.  Hemoglobin  9.1 up from 8.3 she did receive blood transfusions in the hospital.  Per her report she lives with her significant other.      ALLERGIES:Other environmental allergy, Adhesive tape, Adhesive [cyanoacrylate], Latex, Ragweeds, Oxycodone-acetaminophen, and Vicodin [hydrocodone-acetaminophen]    PAST MEDICAL HISTORY:   Past Medical History:   Diagnosis Date     Ankle contracture, left      Class 3 severe obesity due to excess calories without serious comorbidity with body mass index (BMI) of 45.0 to 49.9 in adult (H)      Combined gastric and duodenal ulcer      Controlled substance agreement broken      Depression      Dyslipidemia      Edema      Edema      Gait abnormality      GERD (gastroesophageal reflux disease)      Gout      Lymphedema of both lower extremities      Melanoma (H)     left upper arm     MS (multiple sclerosis) (H)      RLS (restless legs syndrome)      Skin ulcer of left lower leg (H)      Valgus deformity of both feet      Vitamin D deficiency        PAST SURGICAL HISTORY:   has a past surgical history that includes Mammoplasty reduction (Bilateral); Cosmetic surgery (1988); Esophagoscopy, gastroscopy, duodenoscopy (EGD), combined (N/A, 03/30/2015); Spine surgery; Ablation Saphenous Vein W/ Rfa (Right, 04/12/2021); Mohs micrographic procedure; PICC/Midline Placement (8/13/2021); PICC/Midline Placement (9/2/2021); Irrigation and debridement lower extremity, combined (Right, 3/21/2022); Irrigation and debridement lower extremity, combined (Right, 3/28/2022); PICC/Midline Placement (7/21/2024); PICC/Midline Placement (9/8/2024); PICC/Midline Placement (1/14/2025); PICC/Midline Placement (3/20/2025); and Esophagoscopy, gastroscopy, duodenoscopy (EGD), combined (N/A, 3/21/2025).    FAMILY HISTORY: family history includes Arthritis in her maternal grandmother, maternal uncle, paternal grandfather, paternal grandmother,  and sister; Asthma in her sister; Brain Cancer in her father; Breast Cancer in her paternal aunt; Colon Cancer in her paternal aunt; Early Death in her maternal grandfather; Hyperlipidemia in her mother, paternal aunt, and sister; Hypertension in her mother, paternal aunt, and sister; Multiple Sclerosis in her mother and sister; Obesity in her sister; Uterine Cancer in her mother.    SOCIAL HISTORY:  reports that she quit smoking about 49 years ago. Her smoking use included cigarettes. She started smoking about 61 years ago. She has a 3 pack-year smoking history. She has never used smokeless tobacco. She reports current alcohol use of about 1.0 standard drink of alcohol per week. She reports that she does not use drugs.    ROS:  Constitutional: Negative for activity change, appetite change, fatigue and fever.   HENT: Negative for congestion.    Respiratory: Negative for cough, shortness of breath and wheezing.    Cardiovascular: Negative for chest pain and positive leg swelling.   Gastrointestinal: Negative for abdominal distention, abdominal pain, constipation, diarrhea and nausea.  Recent diarrhea however reports bowel movements are now formed.  Genitourinary: Negative for dysuria.   Musculoskeletal: Negative for arthralgia. Negative for back pain.   Skin: Negative for color change and wound.  Bilateral lower extremity wounds dressing changes in place wound care team to follow  Neurological: Negative for dizziness.   Psychiatric/Behavioral: Negative for agitation, behavioral problems and confusion.     Physical Exam:  Constitutional:       Appearance: Patient is well-developed.   HENT:      Head: Normocephalic.   Eyes:      Conjunctiva/sclera: Conjunctivae normal.   Neck:      Musculoskeletal: Normal range of motion.   Cardiovascular:      Rate and Rhythm: Normal rate and regular rhythm.      Heart sounds: Normal heart sounds. No murmur.   Pulmonary:      Effort: No respiratory distress.      Breath sounds: Normal  breath sounds. No wheezing or rales.   Abdominal:      General: Bowel sounds are normal. There is no distension.      Palpations: Abdomen is soft.      Tenderness: There is no abdominal tenderness.   Musculoskeletal:       Normal range of motion.  Lower extremity edema right leg greater than the left wearing lower extremity lymphedema wraps  Skin:General:        Skin is warm.  Bilateral lower extremity cellulitis, rash left shoulder  Neurological:         Mental Status: Patient is alert and oriented to person, place, and time.   Psychiatric:         Behavior: Behavior normal.     Vitals:BP (!) 148/70   Pulse 77   Temp 97.5  F (36.4  C)   Resp 18   Ht 1.524 m (5')   Wt 104.3 kg (230 lb)   SpO2 95%   BMI 44.92 kg/m   and Body mass index is 44.92 kg/m .    Lab/Diagnostic data:   No results found for this or any previous visit (from the past 240 hours).       MEDICATIONS:  MED REC REQUIRED  Post Medication Reconciliation Status: discharge medications reconciled, continue medications without change          Review of your medicines            Accurate as of April 17, 2025 10:36 AM. If you have any questions, ask your nurse or doctor.                CONTINUE these medicines which may have CHANGED, or have new prescriptions. If we are uncertain of the size of tablets/capsules you have at home, strength may be listed as something that might have changed.        Dose / Directions   triamcinolone 0.1 % external cream  Commonly known as: KENALOG  This may have changed: additional instructions  Used for: Rash  Changed by: Lucia BELCHER      Apply topically 2 times daily for 7 days. Apply to left shoulder  Quantity: 80 g  Refills: 0            CONTINUE these medicines which have NOT CHANGED        Dose / Directions   acetaminophen 500 MG tablet  Commonly known as: TYLENOL      Dose: 1,000 mg  Take 1,000 mg by mouth every 6 hours as needed for pain.  Refills: 0     ascorbic acid 250 MG Chew chewable tablet  Commonly known as:  vitamin C      Dose: 250 mg  Take 250 mg by mouth daily.  Refills: 0     buPROPion 150 MG 12 hr tablet  Commonly known as: WELLBUTRIN SR      Dose: 150 mg  Take 150 mg by mouth every morning  Refills: 0     Carboxymethylcellulose Sodium 0.25 % Soln      Dose: 1 drop  Apply 1 drop to eye daily as needed  Refills: 0     citalopram 40 MG tablet  Commonly known as: celeXA      Dose: 40 mg  Take 40 mg by mouth daily (with lunch)  Refills: 0     diclofenac 1 % topical gel  Commonly known as: VOLTAREN      Dose: 2 g  Apply 2 g topically 4 times daily as needed for moderate pain.  Refills: 0     ferrous sulfate 325 (65 Fe) MG tablet  Commonly known as: FEROSUL      Dose: 325 mg  Take 325 mg by mouth every other day.  Refills: 0     furosemide 20 MG tablet  Commonly known as: LASIX  Used for: Cellulitis of right lower leg [L03.115]      Dose: 60 mg  Take 3 tablets (60 mg) by mouth daily.  Quantity: 30 tablet  Refills: 1     gabapentin 100 MG capsule  Commonly known as: NEURONTIN      Dose: 100 mg  Take 100 mg by mouth 3 times daily  Refills: 0     HYDROmorphone 2 MG tablet  Commonly known as: DILAUDID  Used for: Cellulitis of left lower extremity [L03.116], Cellulitis of right lower leg [L03.115]      Dose: 1-2 mg  Take 0.5-1 tablets (1-2 mg) by mouth every 6 hours as needed for moderate to severe pain.  Quantity: 15 tablet  Refills: 0     lactobacillus rhamnosus (GG) capsule  Used for: Cellulitis of left lower extremity [L03.116], Cellulitis of right lower leg [L03.115]      Dose: 1 capsule  Take 1 capsule by mouth 2 times daily.  Refills: 0     magnesium oxide 400 MG tablet  Commonly known as: MAG-OX      Dose: 1 tablet  Take 1 tablet by mouth every morning  Refills: 0     minocycline 50 MG tablet  Commonly known as: DYNACIN  Used for: Cellulitis of right lower leg [L03.115]      Dose: 50 mg  Take 1 tablet (50 mg) by mouth daily.  Refills: 0     multivitamin w/minerals tablet      Dose: 1 tablet  Take 1 tablet by mouth  every morning  Refills: 0     nitroGLYcerin 0.4 MG sublingual tablet  Commonly known as: NITROSTAT      PLACE 1 TABLET UNDER TONGUE EVERY 5 MINTUES AS NEEDED FOR CHEST PAIN FOR UP TO 30 DAYS  Refills: 0     nystatin 888778 UNIT/GM external powder  Commonly known as: MYCOSTATIN      Apply topically 2 times daily as needed.  Refills: 0     pantoprazole 40 MG EC tablet  Commonly known as: PROTONIX  Used for: Cellulitis of right lower leg [L03.115]      Dose: 40 mg  Take 1 tablet (40 mg) by mouth 2 times daily.  Quantity: 60 tablet  Refills: 1     potassium chloride ER 20 MEQ CR tablet  Commonly known as: K-TAB      Dose: 20 mEq  Take 20 mEq by mouth 2 times daily.  Refills: 0     rOPINIRole 1 MG tablet  Commonly known as: REQUIP      Dose: 0.5 mg  Take 0.5 mg by mouth 2 times daily. Take one-half tablet (dose = 0.5 mg) twice daily. Once in the morning and once about dinnertime.     In addition, take one tablet (1 mg) at bedtime.  Refills: 0     simvastatin 40 MG tablet  Commonly known as: ZOCOR      Dose: 40 mg  [SIMVASTATIN (ZOCOR) 40 MG TABLET] Take 40 mg by mouth bedtime.  Refills: 0     spironolactone 25 MG tablet  Commonly known as: ALDACTONE      Dose: 25 mg  Take 25 mg by mouth every morning  Refills: 0     VASHE WOUND THERAPY EX      Externally apply topically daily as needed  Refills: 0     vitamin B-Complex      Dose: 1 tablet  Take 1 tablet by mouth daily.  Refills: 0     vitamin D3 250 mcg (86737 units) capsule  Commonly known as: CHOLECALCIFEROL      Dose: 1 capsule  Take 1 capsule by mouth daily.  Refills: 0     zinc 50 MG Tabs      Dose: 1 tablet  Take 1 tablet by mouth three times a week. On Tues, Thur, Sat  Refills: 0               Where to get your medicines        These medications were sent to [facility]/pharmacy #[id number] - [place], [place] - [address]  [address], [facility] [address]      Phone: [phone]   triamcinolone 0.1 % external cream         ASSESSMENT/PLAN  Encounter  Diagnosis   Name Primary?     Rash Yes     Rash left shoulder triamcinolone cream twice daily x 1 week    Twitches intermittent on medications for restless leg syndrome declined an increase in these meds denied the twitches affecting her sleep and recommended she update her neurologist    Physical deconditioning She will have home care services PT/OT/HHA/RN    Pain management as needed Tylenol daily, Dilaudid 1 to 2 mg every 6 hours as needed    GERD on Protonix    Cellulitis bilateral lower extremities dressing changes as ordered, completed minocycline and Keflex on 4/1/2025 and then  she will be on a lower dose minocycline indefinitely    Restless leg syndrome continue ropinirole    Mood disorder on bupropion, Celexa 40 mg daily    Anemia on ferrous sulfate daily monitor labs hemoglobin on 3/31/2025 was 9.1 up from 8.3    Edema continue Lasix 20 mg daily    Neuropathy continue gabapentin    CHF Daily weights, continue Lasix spironolactone      Electronically signed by: Rosario Styles CNP       Sincerely,        Rosario Styles CNP    Electronically signed

## 2025-05-16 ENCOUNTER — LAB REQUISITION (OUTPATIENT)
Dept: LAB | Facility: CLINIC | Age: 80
End: 2025-05-16
Payer: COMMERCIAL

## 2025-05-16 PROCEDURE — 80053 COMPREHEN METABOLIC PANEL: CPT | Mod: ORL

## 2025-05-17 LAB
ALBUMIN SERPL BCG-MCNC: 4 G/DL (ref 3.5–5.2)
ALP SERPL-CCNC: 118 U/L (ref 40–150)
ALT SERPL W P-5'-P-CCNC: 17 U/L (ref 0–50)
ANION GAP SERPL CALCULATED.3IONS-SCNC: 16 MMOL/L (ref 7–15)
AST SERPL W P-5'-P-CCNC: 18 U/L (ref 0–45)
BILIRUB SERPL-MCNC: 0.2 MG/DL
BUN SERPL-MCNC: 21.3 MG/DL (ref 8–23)
CALCIUM SERPL-MCNC: 9.7 MG/DL (ref 8.8–10.4)
CHLORIDE SERPL-SCNC: 104 MMOL/L (ref 98–107)
CREAT SERPL-MCNC: 0.97 MG/DL (ref 0.51–0.95)
EGFRCR SERPLBLD CKD-EPI 2021: 60 ML/MIN/1.73M2
GLUCOSE SERPL-MCNC: 93 MG/DL (ref 70–99)
HCO3 SERPL-SCNC: 24 MMOL/L (ref 22–29)
POTASSIUM SERPL-SCNC: 4.1 MMOL/L (ref 3.4–5.3)
PROT SERPL-MCNC: 7.8 G/DL (ref 6.4–8.3)
SODIUM SERPL-SCNC: 144 MMOL/L (ref 135–145)

## 2025-06-17 ENCOUNTER — MEDICAL CORRESPONDENCE (OUTPATIENT)
Dept: HEALTH INFORMATION MANAGEMENT | Facility: CLINIC | Age: 80
End: 2025-06-17
Payer: COMMERCIAL

## 2025-07-01 ENCOUNTER — LAB REQUISITION (OUTPATIENT)
Dept: LAB | Facility: CLINIC | Age: 80
End: 2025-07-01
Payer: COMMERCIAL

## 2025-07-01 PROCEDURE — 80053 COMPREHEN METABOLIC PANEL: CPT | Mod: ORL

## 2025-07-02 ENCOUNTER — LAB REQUISITION (OUTPATIENT)
Dept: LAB | Facility: HOSPITAL | Age: 80
End: 2025-07-02
Payer: COMMERCIAL

## 2025-07-02 DIAGNOSIS — L97.812 NON-PRESSURE CHRONIC ULCER OF OTHER PART OF RIGHT LOWER LEG WITH FAT LAYER EXPOSED (H): ICD-10-CM

## 2025-07-02 DIAGNOSIS — I89.0 LYMPHEDEMA, NOT ELSEWHERE CLASSIFIED: ICD-10-CM

## 2025-07-02 LAB
ALBUMIN SERPL BCG-MCNC: 3.7 G/DL (ref 3.5–5.2)
ALP SERPL-CCNC: 131 U/L (ref 40–150)
ALT SERPL W P-5'-P-CCNC: 19 U/L (ref 0–50)
ANION GAP SERPL CALCULATED.3IONS-SCNC: 12 MMOL/L (ref 7–15)
AST SERPL W P-5'-P-CCNC: 16 U/L (ref 0–45)
BILIRUB SERPL-MCNC: <0.2 MG/DL
BUN SERPL-MCNC: 29.3 MG/DL (ref 8–23)
CALCIUM SERPL-MCNC: 9.4 MG/DL (ref 8.8–10.4)
CHLORIDE SERPL-SCNC: 104 MMOL/L (ref 98–107)
CREAT SERPL-MCNC: 1 MG/DL (ref 0.51–0.95)
EGFRCR SERPLBLD CKD-EPI 2021: 58 ML/MIN/1.73M2
GLUCOSE SERPL-MCNC: 96 MG/DL (ref 70–99)
HCO3 SERPL-SCNC: 26 MMOL/L (ref 22–29)
POTASSIUM SERPL-SCNC: 4 MMOL/L (ref 3.4–5.3)
PROT SERPL-MCNC: 7.4 G/DL (ref 6.4–8.3)
SODIUM SERPL-SCNC: 142 MMOL/L (ref 135–145)

## 2025-07-02 PROCEDURE — 87070 CULTURE OTHR SPECIMN AEROBIC: CPT | Mod: ORL

## 2025-07-03 LAB
BACTERIA WND CULT: ABNORMAL

## 2025-07-07 ENCOUNTER — APPOINTMENT (OUTPATIENT)
Dept: RADIOLOGY | Facility: HOSPITAL | Age: 80
End: 2025-07-07
Attending: EMERGENCY MEDICINE
Payer: COMMERCIAL

## 2025-07-07 ENCOUNTER — HOSPITAL ENCOUNTER (INPATIENT)
Facility: HOSPITAL | Age: 80
End: 2025-07-07
Attending: EMERGENCY MEDICINE
Payer: COMMERCIAL

## 2025-07-07 ENCOUNTER — APPOINTMENT (OUTPATIENT)
Dept: ULTRASOUND IMAGING | Facility: HOSPITAL | Age: 80
End: 2025-07-07
Attending: EMERGENCY MEDICINE
Payer: COMMERCIAL

## 2025-07-07 DIAGNOSIS — L03.116 CELLULITIS OF LEFT LOWER EXTREMITY: Primary | ICD-10-CM

## 2025-07-07 DIAGNOSIS — L03.115 CELLULITIS OF RIGHT LOWER EXTREMITY: ICD-10-CM

## 2025-07-07 LAB
ANION GAP SERPL CALCULATED.3IONS-SCNC: 10 MMOL/L (ref 7–15)
BASOPHILS # BLD AUTO: 0.1 10E3/UL (ref 0–0.2)
BASOPHILS NFR BLD AUTO: 1 %
BUN SERPL-MCNC: 29.6 MG/DL (ref 8–23)
CALCIUM SERPL-MCNC: 9.2 MG/DL (ref 8.8–10.4)
CHLORIDE SERPL-SCNC: 103 MMOL/L (ref 98–107)
CREAT SERPL-MCNC: 0.92 MG/DL (ref 0.51–0.95)
CRP SERPL-MCNC: 57.2 MG/L
EGFRCR SERPLBLD CKD-EPI 2021: 64 ML/MIN/1.73M2
EOSINOPHIL # BLD AUTO: 0.2 10E3/UL (ref 0–0.7)
EOSINOPHIL NFR BLD AUTO: 3 %
ERYTHROCYTE [DISTWIDTH] IN BLOOD BY AUTOMATED COUNT: 16.4 % (ref 10–15)
GLUCOSE SERPL-MCNC: 99 MG/DL (ref 70–99)
HCO3 SERPL-SCNC: 25 MMOL/L (ref 22–29)
HCT VFR BLD AUTO: 34.7 % (ref 35–47)
HGB BLD-MCNC: 10.4 G/DL (ref 11.7–15.7)
IMM GRANULOCYTES # BLD: 0 10E3/UL
IMM GRANULOCYTES NFR BLD: 0 %
LYMPHOCYTES # BLD AUTO: 1.6 10E3/UL (ref 0.8–5.3)
LYMPHOCYTES NFR BLD AUTO: 19 %
MCH RBC QN AUTO: 24.8 PG (ref 26.5–33)
MCHC RBC AUTO-ENTMCNC: 30 G/DL (ref 31.5–36.5)
MCV RBC AUTO: 83 FL (ref 78–100)
MONOCYTES # BLD AUTO: 0.9 10E3/UL (ref 0–1.3)
MONOCYTES NFR BLD AUTO: 10 %
NEUTROPHILS # BLD AUTO: 5.7 10E3/UL (ref 1.6–8.3)
NEUTROPHILS NFR BLD AUTO: 68 %
NRBC # BLD AUTO: 0 10E3/UL
NRBC BLD AUTO-RTO: 0 /100
NT-PROBNP SERPL-MCNC: 125 PG/ML (ref 0–624)
PLATELET # BLD AUTO: 375 10E3/UL (ref 150–450)
POTASSIUM SERPL-SCNC: 5 MMOL/L (ref 3.4–5.3)
RBC # BLD AUTO: 4.2 10E6/UL (ref 3.8–5.2)
SODIUM SERPL-SCNC: 138 MMOL/L (ref 135–145)
WBC # BLD AUTO: 8.5 10E3/UL (ref 4–11)

## 2025-07-07 PROCEDURE — 84100 ASSAY OF PHOSPHORUS: CPT | Performed by: INTERNAL MEDICINE

## 2025-07-07 PROCEDURE — 99285 EMERGENCY DEPT VISIT HI MDM: CPT | Mod: 25

## 2025-07-07 PROCEDURE — 93005 ELECTROCARDIOGRAM TRACING: CPT | Performed by: EMERGENCY MEDICINE

## 2025-07-07 PROCEDURE — 93970 EXTREMITY STUDY: CPT

## 2025-07-07 PROCEDURE — 86140 C-REACTIVE PROTEIN: CPT | Performed by: EMERGENCY MEDICINE

## 2025-07-07 PROCEDURE — 83880 ASSAY OF NATRIURETIC PEPTIDE: CPT | Performed by: EMERGENCY MEDICINE

## 2025-07-07 PROCEDURE — 71045 X-RAY EXAM CHEST 1 VIEW: CPT

## 2025-07-07 PROCEDURE — 85004 AUTOMATED DIFF WBC COUNT: CPT | Performed by: EMERGENCY MEDICINE

## 2025-07-07 PROCEDURE — 83735 ASSAY OF MAGNESIUM: CPT | Performed by: INTERNAL MEDICINE

## 2025-07-07 PROCEDURE — 80048 BASIC METABOLIC PNL TOTAL CA: CPT | Performed by: EMERGENCY MEDICINE

## 2025-07-07 PROCEDURE — 87040 BLOOD CULTURE FOR BACTERIA: CPT | Performed by: EMERGENCY MEDICINE

## 2025-07-07 PROCEDURE — 36415 COLL VENOUS BLD VENIPUNCTURE: CPT | Performed by: EMERGENCY MEDICINE

## 2025-07-07 RX ORDER — CEFAZOLIN SODIUM 1 G/50ML
2000 SOLUTION INTRAVENOUS ONCE
Status: COMPLETED | OUTPATIENT
Start: 2025-07-07 | End: 2025-07-08

## 2025-07-07 RX ORDER — PIPERACILLIN SODIUM, TAZOBACTAM SODIUM 3; .375 G/15ML; G/15ML
3.38 INJECTION, POWDER, LYOPHILIZED, FOR SOLUTION INTRAVENOUS ONCE
Status: DISCONTINUED | OUTPATIENT
Start: 2025-07-07 | End: 2025-07-08

## 2025-07-07 ASSESSMENT — ACTIVITIES OF DAILY LIVING (ADL)
ADLS_ACUITY_SCORE: 58
ADLS_ACUITY_SCORE: 58

## 2025-07-07 ASSESSMENT — ENCOUNTER SYMPTOMS
CHILLS: 0
FEVER: 0

## 2025-07-07 NOTE — ED TRIAGE NOTES
Pt amb to triage. Per pt, she has cellulitis on her RLE and LLE, and her home health nurse told her the wounds are unable to be treated with oral abx and recommended ED evaluation. Rates pain 6/10. Wounds covered with coban. Swelling present in both extremities as well      Triage Assessment (Adult)       Row Name 07/07/25 1714          Triage Assessment    Airway WDL WDL        Respiratory WDL    Respiratory WDL WDL        Skin Circulation/Temperature WDL    Skin Circulation/Temperature WDL X  LE cellulitis

## 2025-07-08 ENCOUNTER — APPOINTMENT (OUTPATIENT)
Dept: RADIOLOGY | Facility: HOSPITAL | Age: 80
End: 2025-07-08
Attending: EMERGENCY MEDICINE
Payer: COMMERCIAL

## 2025-07-08 PROBLEM — L03.115 CELLULITIS OF RIGHT LOWER EXTREMITY: Status: ACTIVE | Noted: 2025-07-08

## 2025-07-08 PROBLEM — L03.116 BILATERAL CELLULITIS OF LOWER LEG: Status: ACTIVE | Noted: 2025-07-08

## 2025-07-08 PROBLEM — N95.9 MENOPAUSAL AND POSTMENOPAUSAL DISORDER: Status: ACTIVE | Noted: 2021-07-05

## 2025-07-08 PROBLEM — L03.115 BILATERAL CELLULITIS OF LOWER LEG: Status: ACTIVE | Noted: 2025-07-08

## 2025-07-08 LAB
ANION GAP SERPL CALCULATED.3IONS-SCNC: 8 MMOL/L (ref 7–15)
BACTERIA WND CULT: ABNORMAL
BUN SERPL-MCNC: 22.8 MG/DL (ref 8–23)
CALCIUM SERPL-MCNC: 8.7 MG/DL (ref 8.8–10.4)
CHLORIDE SERPL-SCNC: 106 MMOL/L (ref 98–107)
CREAT SERPL-MCNC: 0.88 MG/DL (ref 0.51–0.95)
EGFRCR SERPLBLD CKD-EPI 2021: 67 ML/MIN/1.73M2
ERYTHROCYTE [DISTWIDTH] IN BLOOD BY AUTOMATED COUNT: 16.1 % (ref 10–15)
GLUCOSE SERPL-MCNC: 91 MG/DL (ref 70–99)
HCO3 SERPL-SCNC: 27 MMOL/L (ref 22–29)
HCT VFR BLD AUTO: 31 % (ref 35–47)
HGB BLD-MCNC: 9.4 G/DL (ref 11.7–15.7)
HOLD SPECIMEN: NORMAL
HOLD SPECIMEN: NORMAL
MAGNESIUM SERPL-MCNC: 1.9 MG/DL (ref 1.7–2.3)
MAGNESIUM SERPL-MCNC: 2.1 MG/DL (ref 1.7–2.3)
MCH RBC QN AUTO: 24.7 PG (ref 26.5–33)
MCHC RBC AUTO-ENTMCNC: 30.3 G/DL (ref 31.5–36.5)
MCV RBC AUTO: 81 FL (ref 78–100)
PHOSPHATE SERPL-MCNC: 3.2 MG/DL (ref 2.5–4.5)
PHOSPHATE SERPL-MCNC: 4.6 MG/DL (ref 2.5–4.5)
PLATELET # BLD AUTO: 311 10E3/UL (ref 150–450)
POTASSIUM SERPL-SCNC: 4 MMOL/L (ref 3.4–5.3)
RBC # BLD AUTO: 3.81 10E6/UL (ref 3.8–5.2)
SODIUM SERPL-SCNC: 141 MMOL/L (ref 135–145)
WBC # BLD AUTO: 11.5 10E3/UL (ref 4–11)

## 2025-07-08 PROCEDURE — 36569 INSJ PICC 5 YR+ W/O IMAGING: CPT

## 2025-07-08 PROCEDURE — 250N000011 HC RX IP 250 OP 636: Performed by: INTERNAL MEDICINE

## 2025-07-08 PROCEDURE — G0545 PR INHRENT VISIT TO INPT/OBS W CNFRM/SUSPCT INFCT DIS BY INFCT DIS SPCIALST: HCPCS | Performed by: INTERNAL MEDICINE

## 2025-07-08 PROCEDURE — 36415 COLL VENOUS BLD VENIPUNCTURE: CPT | Performed by: EMERGENCY MEDICINE

## 2025-07-08 PROCEDURE — 36569 INSJ PICC 5 YR+ W/O IMAGING: CPT | Mod: 52

## 2025-07-08 PROCEDURE — 99223 1ST HOSP IP/OBS HIGH 75: CPT | Mod: AI | Performed by: HOSPITALIST

## 2025-07-08 PROCEDURE — 250N000013 HC RX MED GY IP 250 OP 250 PS 637: Performed by: NURSE PRACTITIONER

## 2025-07-08 PROCEDURE — 99222 1ST HOSP IP/OBS MODERATE 55: CPT | Performed by: NURSE PRACTITIONER

## 2025-07-08 PROCEDURE — 84100 ASSAY OF PHOSPHORUS: CPT | Performed by: INTERNAL MEDICINE

## 2025-07-08 PROCEDURE — 120N000001 HC R&B MED SURG/OB

## 2025-07-08 PROCEDURE — 85014 HEMATOCRIT: CPT | Performed by: HOSPITALIST

## 2025-07-08 PROCEDURE — G0463 HOSPITAL OUTPT CLINIC VISIT: HCPCS | Mod: 25

## 2025-07-08 PROCEDURE — 250N000011 HC RX IP 250 OP 636: Performed by: HOSPITALIST

## 2025-07-08 PROCEDURE — 87040 BLOOD CULTURE FOR BACTERIA: CPT | Performed by: EMERGENCY MEDICINE

## 2025-07-08 PROCEDURE — 99418 PROLNG IP/OBS E/M EA 15 MIN: CPT | Performed by: HOSPITALIST

## 2025-07-08 PROCEDURE — 250N000013 HC RX MED GY IP 250 OP 250 PS 637: Performed by: HOSPITALIST

## 2025-07-08 PROCEDURE — 250N000011 HC RX IP 250 OP 636: Performed by: EMERGENCY MEDICINE

## 2025-07-08 PROCEDURE — 250N000009 HC RX 250: Performed by: EMERGENCY MEDICINE

## 2025-07-08 PROCEDURE — 71045 X-RAY EXAM CHEST 1 VIEW: CPT

## 2025-07-08 PROCEDURE — 80048 BASIC METABOLIC PNL TOTAL CA: CPT | Performed by: HOSPITALIST

## 2025-07-08 PROCEDURE — 272N000450 HC KIT 4FR POWER PICC SINGLE LUMEN

## 2025-07-08 PROCEDURE — 02HV33Z INSERTION OF INFUSION DEVICE INTO SUPERIOR VENA CAVA, PERCUTANEOUS APPROACH: ICD-10-PCS | Performed by: RADIOLOGY

## 2025-07-08 PROCEDURE — 99207 PR NO BILLABLE SERVICE THIS VISIT: CPT | Performed by: INTERNAL MEDICINE

## 2025-07-08 PROCEDURE — 83735 ASSAY OF MAGNESIUM: CPT | Performed by: INTERNAL MEDICINE

## 2025-07-08 PROCEDURE — 36415 COLL VENOUS BLD VENIPUNCTURE: CPT | Performed by: HOSPITALIST

## 2025-07-08 PROCEDURE — 250N000011 HC RX IP 250 OP 636: Performed by: NURSE PRACTITIONER

## 2025-07-08 PROCEDURE — 258N000003 HC RX IP 258 OP 636: Performed by: EMERGENCY MEDICINE

## 2025-07-08 PROCEDURE — 99222 1ST HOSP IP/OBS MODERATE 55: CPT | Performed by: INTERNAL MEDICINE

## 2025-07-08 RX ORDER — NALOXONE HYDROCHLORIDE 0.4 MG/ML
0.4 INJECTION, SOLUTION INTRAMUSCULAR; INTRAVENOUS; SUBCUTANEOUS
Status: DISCONTINUED | OUTPATIENT
Start: 2025-07-08 | End: 2025-07-13 | Stop reason: HOSPADM

## 2025-07-08 RX ORDER — CEFAZOLIN SODIUM 1 G/50ML
1250 SOLUTION INTRAVENOUS EVERY 24 HOURS
Status: DISCONTINUED | OUTPATIENT
Start: 2025-07-09 | End: 2025-07-12

## 2025-07-08 RX ORDER — OXYCODONE HYDROCHLORIDE 5 MG/1
5 TABLET ORAL EVERY 6 HOURS PRN
Status: DISCONTINUED | OUTPATIENT
Start: 2025-07-08 | End: 2025-07-08

## 2025-07-08 RX ORDER — ROPINIROLE 0.25 MG/1
0.5 TABLET, FILM COATED ORAL 2 TIMES DAILY
Status: DISCONTINUED | OUTPATIENT
Start: 2025-07-08 | End: 2025-07-13 | Stop reason: HOSPADM

## 2025-07-08 RX ORDER — GABAPENTIN 100 MG/1
200 CAPSULE ORAL ONCE
Status: COMPLETED | OUTPATIENT
Start: 2025-07-08 | End: 2025-07-08

## 2025-07-08 RX ORDER — BUPROPION HYDROCHLORIDE 150 MG/1
150 TABLET ORAL EVERY MORNING
COMMUNITY
Start: 2025-06-23

## 2025-07-08 RX ORDER — HYDRALAZINE HYDROCHLORIDE 10 MG/1
10 TABLET, FILM COATED ORAL EVERY 4 HOURS PRN
Status: DISCONTINUED | OUTPATIENT
Start: 2025-07-08 | End: 2025-07-13 | Stop reason: HOSPADM

## 2025-07-08 RX ORDER — HYDROXYZINE HYDROCHLORIDE 10 MG/1
10 TABLET, FILM COATED ORAL EVERY 6 HOURS PRN
Status: DISCONTINUED | OUTPATIENT
Start: 2025-07-08 | End: 2025-07-13 | Stop reason: HOSPADM

## 2025-07-08 RX ORDER — CEFEPIME HYDROCHLORIDE 2 G/1
2 INJECTION, POWDER, FOR SOLUTION INTRAVENOUS ONCE
Status: DISCONTINUED | OUTPATIENT
Start: 2025-07-08 | End: 2025-07-08

## 2025-07-08 RX ORDER — MULTIVIT WITH MINERALS/LUTEIN
250 TABLET ORAL DAILY
Status: DISCONTINUED | OUTPATIENT
Start: 2025-07-08 | End: 2025-07-13 | Stop reason: HOSPADM

## 2025-07-08 RX ORDER — ACETAMINOPHEN 325 MG/1
650 TABLET ORAL EVERY 4 HOURS PRN
Status: DISCONTINUED | OUTPATIENT
Start: 2025-07-08 | End: 2025-07-08

## 2025-07-08 RX ORDER — NALOXONE HYDROCHLORIDE 0.4 MG/ML
0.2 INJECTION, SOLUTION INTRAMUSCULAR; INTRAVENOUS; SUBCUTANEOUS
Status: DISCONTINUED | OUTPATIENT
Start: 2025-07-08 | End: 2025-07-13 | Stop reason: HOSPADM

## 2025-07-08 RX ORDER — SPIRONOLACTONE 25 MG/1
25 TABLET ORAL AT BEDTIME
Status: DISCONTINUED | OUTPATIENT
Start: 2025-07-08 | End: 2025-07-13 | Stop reason: HOSPADM

## 2025-07-08 RX ORDER — GABAPENTIN 300 MG/1
300 CAPSULE ORAL 2 TIMES DAILY
Status: DISCONTINUED | OUTPATIENT
Start: 2025-07-08 | End: 2025-07-13 | Stop reason: HOSPADM

## 2025-07-08 RX ORDER — FUROSEMIDE 20 MG/1
40 TABLET ORAL DAILY
Status: DISCONTINUED | OUTPATIENT
Start: 2025-07-08 | End: 2025-07-13 | Stop reason: HOSPADM

## 2025-07-08 RX ORDER — SIMVASTATIN 10 MG
40 TABLET ORAL AT BEDTIME
Status: DISCONTINUED | OUTPATIENT
Start: 2025-07-08 | End: 2025-07-13 | Stop reason: HOSPADM

## 2025-07-08 RX ORDER — AMOXICILLIN 250 MG
2 CAPSULE ORAL 2 TIMES DAILY
Status: DISCONTINUED | OUTPATIENT
Start: 2025-07-08 | End: 2025-07-13 | Stop reason: HOSPADM

## 2025-07-08 RX ORDER — ENOXAPARIN SODIUM 100 MG/ML
40 INJECTION SUBCUTANEOUS EVERY 12 HOURS
Status: DISCONTINUED | OUTPATIENT
Start: 2025-07-08 | End: 2025-07-13 | Stop reason: HOSPADM

## 2025-07-08 RX ORDER — AMOXICILLIN 250 MG
1 CAPSULE ORAL 2 TIMES DAILY PRN
Status: DISCONTINUED | OUTPATIENT
Start: 2025-07-08 | End: 2025-07-13 | Stop reason: HOSPADM

## 2025-07-08 RX ORDER — ONDANSETRON 2 MG/ML
4 INJECTION INTRAMUSCULAR; INTRAVENOUS EVERY 6 HOURS PRN
Status: DISCONTINUED | OUTPATIENT
Start: 2025-07-08 | End: 2025-07-13 | Stop reason: HOSPADM

## 2025-07-08 RX ORDER — ACETAMINOPHEN 650 MG/1
650 SUPPOSITORY RECTAL EVERY 4 HOURS PRN
Status: DISCONTINUED | OUTPATIENT
Start: 2025-07-08 | End: 2025-07-08

## 2025-07-08 RX ORDER — VITAMIN E (DL,TOCOPHERYL ACET) 90 MG
400 CAPSULE ORAL DAILY
Status: DISCONTINUED | OUTPATIENT
Start: 2025-07-08 | End: 2025-07-13 | Stop reason: HOSPADM

## 2025-07-08 RX ORDER — ZINC SULFATE 50(220)MG
220 CAPSULE ORAL
Status: DISCONTINUED | OUTPATIENT
Start: 2025-07-08 | End: 2025-07-13 | Stop reason: HOSPADM

## 2025-07-08 RX ORDER — ROPINIROLE 1 MG/1
1 TABLET, FILM COATED ORAL AT BEDTIME
COMMUNITY

## 2025-07-08 RX ORDER — POTASSIUM CHLORIDE 1500 MG/1
20 TABLET, EXTENDED RELEASE ORAL 3 TIMES DAILY
Status: DISCONTINUED | OUTPATIENT
Start: 2025-07-08 | End: 2025-07-13 | Stop reason: HOSPADM

## 2025-07-08 RX ORDER — HYDRALAZINE HYDROCHLORIDE 20 MG/ML
10 INJECTION INTRAMUSCULAR; INTRAVENOUS EVERY 4 HOURS PRN
Status: DISCONTINUED | OUTPATIENT
Start: 2025-07-08 | End: 2025-07-13 | Stop reason: HOSPADM

## 2025-07-08 RX ORDER — CEFEPIME HYDROCHLORIDE 2 G/1
2 INJECTION, POWDER, FOR SOLUTION INTRAVENOUS EVERY 8 HOURS
Status: DISCONTINUED | OUTPATIENT
Start: 2025-07-08 | End: 2025-07-08 | Stop reason: DRUGHIGH

## 2025-07-08 RX ORDER — CEFEPIME HYDROCHLORIDE 2 G/1
2 INJECTION, POWDER, FOR SOLUTION INTRAVENOUS EVERY 12 HOURS
Status: DISCONTINUED | OUTPATIENT
Start: 2025-07-08 | End: 2025-07-12

## 2025-07-08 RX ORDER — FUROSEMIDE 20 MG/1
40 TABLET ORAL DAILY
COMMUNITY

## 2025-07-08 RX ORDER — HYDROMORPHONE HCL IN WATER/PF 6 MG/30 ML
0.2 PATIENT CONTROLLED ANALGESIA SYRINGE INTRAVENOUS EVERY 6 HOURS PRN
Status: DISCONTINUED | OUTPATIENT
Start: 2025-07-08 | End: 2025-07-09

## 2025-07-08 RX ORDER — PANTOPRAZOLE SODIUM 40 MG/1
40 TABLET, DELAYED RELEASE ORAL DAILY
Status: DISCONTINUED | OUTPATIENT
Start: 2025-07-08 | End: 2025-07-13 | Stop reason: HOSPADM

## 2025-07-08 RX ORDER — BUPROPION HYDROCHLORIDE 150 MG/1
150 TABLET ORAL EVERY MORNING
Status: DISCONTINUED | OUTPATIENT
Start: 2025-07-08 | End: 2025-07-13 | Stop reason: HOSPADM

## 2025-07-08 RX ORDER — CITALOPRAM HYDROBROMIDE 40 MG/1
40 TABLET ORAL DAILY
Status: DISCONTINUED | OUTPATIENT
Start: 2025-07-08 | End: 2025-07-13 | Stop reason: HOSPADM

## 2025-07-08 RX ORDER — HYDROMORPHONE HYDROCHLORIDE 1 MG/ML
0.5 INJECTION, SOLUTION INTRAMUSCULAR; INTRAVENOUS; SUBCUTANEOUS
Status: DISCONTINUED | OUTPATIENT
Start: 2025-07-08 | End: 2025-07-08

## 2025-07-08 RX ORDER — MULTIVIT WITH MINERALS/LUTEIN
400 TABLET ORAL DAILY
COMMUNITY

## 2025-07-08 RX ORDER — CALCIUM CARBONATE 500 MG/1
1000 TABLET, CHEWABLE ORAL 4 TIMES DAILY PRN
Status: DISCONTINUED | OUTPATIENT
Start: 2025-07-08 | End: 2025-07-13 | Stop reason: HOSPADM

## 2025-07-08 RX ORDER — AMOXICILLIN 250 MG
1 CAPSULE ORAL 2 TIMES DAILY
Status: DISCONTINUED | OUTPATIENT
Start: 2025-07-08 | End: 2025-07-13 | Stop reason: HOSPADM

## 2025-07-08 RX ORDER — LIDOCAINE 40 MG/G
CREAM TOPICAL
Status: ACTIVE | OUTPATIENT
Start: 2025-07-08 | End: 2025-07-11

## 2025-07-08 RX ORDER — GABAPENTIN 100 MG/1
100 CAPSULE ORAL 3 TIMES DAILY
Status: DISCONTINUED | OUTPATIENT
Start: 2025-07-08 | End: 2025-07-08

## 2025-07-08 RX ORDER — PANTOPRAZOLE SODIUM 40 MG/1
40 TABLET, DELAYED RELEASE ORAL DAILY
COMMUNITY

## 2025-07-08 RX ORDER — ACETAMINOPHEN 500 MG
1000 TABLET ORAL EVERY 6 HOURS PRN
Status: DISCONTINUED | OUTPATIENT
Start: 2025-07-08 | End: 2025-07-08

## 2025-07-08 RX ORDER — MULTIPLE VITAMINS W/ MINERALS TAB 9MG-400MCG
1 TAB ORAL EVERY MORNING
Status: DISCONTINUED | OUTPATIENT
Start: 2025-07-08 | End: 2025-07-13 | Stop reason: HOSPADM

## 2025-07-08 RX ORDER — AMOXICILLIN 250 MG
2 CAPSULE ORAL 2 TIMES DAILY PRN
Status: DISCONTINUED | OUTPATIENT
Start: 2025-07-08 | End: 2025-07-13 | Stop reason: HOSPADM

## 2025-07-08 RX ORDER — FERROUS SULFATE 325(65) MG
325 TABLET ORAL DAILY
Status: DISCONTINUED | OUTPATIENT
Start: 2025-07-08 | End: 2025-07-13 | Stop reason: HOSPADM

## 2025-07-08 RX ORDER — OXYCODONE HYDROCHLORIDE 5 MG/1
5 TABLET ORAL EVERY 4 HOURS PRN
Status: DISCONTINUED | OUTPATIENT
Start: 2025-07-08 | End: 2025-07-08

## 2025-07-08 RX ORDER — DIPHENHYDRAMINE HYDROCHLORIDE 50 MG/ML
50 INJECTION, SOLUTION INTRAMUSCULAR; INTRAVENOUS EVERY 6 HOURS PRN
Status: DISCONTINUED | OUTPATIENT
Start: 2025-07-08 | End: 2025-07-13 | Stop reason: HOSPADM

## 2025-07-08 RX ORDER — MAGNESIUM OXIDE 400 MG/1
400 TABLET ORAL
Status: DISCONTINUED | OUTPATIENT
Start: 2025-07-08 | End: 2025-07-13 | Stop reason: HOSPADM

## 2025-07-08 RX ORDER — ACETAMINOPHEN 325 MG/1
975 TABLET ORAL EVERY 6 HOURS
Status: DISCONTINUED | OUTPATIENT
Start: 2025-07-08 | End: 2025-07-13 | Stop reason: HOSPADM

## 2025-07-08 RX ORDER — MEROPENEM 1 G/1
1 INJECTION, POWDER, FOR SOLUTION INTRAVENOUS ONCE
Status: DISCONTINUED | OUTPATIENT
Start: 2025-07-08 | End: 2025-07-08

## 2025-07-08 RX ORDER — OXYCODONE HYDROCHLORIDE 5 MG/1
5 TABLET ORAL EVERY 6 HOURS PRN
COMMUNITY
Start: 2025-06-24

## 2025-07-08 RX ORDER — ROPINIROLE 1 MG/1
1 TABLET, FILM COATED ORAL AT BEDTIME
Status: DISCONTINUED | OUTPATIENT
Start: 2025-07-08 | End: 2025-07-13 | Stop reason: HOSPADM

## 2025-07-08 RX ORDER — LIDOCAINE 40 MG/G
CREAM TOPICAL
Status: DISCONTINUED | OUTPATIENT
Start: 2025-07-08 | End: 2025-07-13 | Stop reason: HOSPADM

## 2025-07-08 RX ORDER — ONDANSETRON 4 MG/1
4 TABLET, ORALLY DISINTEGRATING ORAL EVERY 6 HOURS PRN
Status: DISCONTINUED | OUTPATIENT
Start: 2025-07-08 | End: 2025-07-13 | Stop reason: HOSPADM

## 2025-07-08 RX ADMIN — MAGNESIUM OXIDE TAB 400 MG (241.3 MG ELEMENTAL MG) 400 MG: 400 (241.3 MG) TAB at 08:36

## 2025-07-08 RX ADMIN — Medication 250 MG: at 08:36

## 2025-07-08 RX ADMIN — LIDOCAINE HYDROCHLORIDE 5 ML: 10 INJECTION, SOLUTION EPIDURAL; INFILTRATION; INTRACAUDAL; PERINEURAL at 01:31

## 2025-07-08 RX ADMIN — ENOXAPARIN SODIUM 40 MG: 40 INJECTION SUBCUTANEOUS at 08:37

## 2025-07-08 RX ADMIN — GABAPENTIN 300 MG: 300 CAPSULE ORAL at 19:23

## 2025-07-08 RX ADMIN — HYDROMORPHONE HYDROCHLORIDE 0.2 MG: 0.2 INJECTION, SOLUTION INTRAMUSCULAR; INTRAVENOUS; SUBCUTANEOUS at 13:56

## 2025-07-08 RX ADMIN — Medication 1 TABLET: at 08:35

## 2025-07-08 RX ADMIN — SENNOSIDES AND DOCUSATE SODIUM 1 TABLET: 50; 8.6 TABLET ORAL at 19:24

## 2025-07-08 RX ADMIN — CEFEPIME HYDROCHLORIDE 2 G: 2 INJECTION, POWDER, FOR SOLUTION INTRAVENOUS at 13:20

## 2025-07-08 RX ADMIN — SIMVASTATIN 40 MG: 10 TABLET, FILM COATED ORAL at 22:30

## 2025-07-08 RX ADMIN — SPIRONOLACTONE 25 MG: 25 TABLET, FILM COATED ORAL at 22:31

## 2025-07-08 RX ADMIN — HYDROXYZINE HYDROCHLORIDE 10 MG: 10 TABLET ORAL at 19:24

## 2025-07-08 RX ADMIN — ROPINIROLE HYDROCHLORIDE 0.5 MG: 0.25 TABLET, FILM COATED ORAL at 19:22

## 2025-07-08 RX ADMIN — SODIUM CHLORIDE 2000 MG: 0.9 INJECTION, SOLUTION INTRAVENOUS at 02:50

## 2025-07-08 RX ADMIN — HYDROMORPHONE HYDROCHLORIDE 1 MG: 2 TABLET ORAL at 07:15

## 2025-07-08 RX ADMIN — GABAPENTIN 200 MG: 100 CAPSULE ORAL at 12:43

## 2025-07-08 RX ADMIN — FUROSEMIDE 40 MG: 20 TABLET ORAL at 08:36

## 2025-07-08 RX ADMIN — GABAPENTIN 100 MG: 100 CAPSULE ORAL at 08:35

## 2025-07-08 RX ADMIN — MICONAZOLE NITRATE ANTIFUNGAL POWDER: 2 POWDER TOPICAL at 22:30

## 2025-07-08 RX ADMIN — ZINC SULFATE 220 MG (50 MG) CAPSULE 220 MG: CAPSULE at 08:37

## 2025-07-08 RX ADMIN — DIPHENHYDRAMINE HYDROCHLORIDE 50 MG: 50 INJECTION, SOLUTION INTRAMUSCULAR; INTRAVENOUS at 06:14

## 2025-07-08 RX ADMIN — PANTOPRAZOLE SODIUM 40 MG: 20 TABLET, DELAYED RELEASE ORAL at 08:36

## 2025-07-08 RX ADMIN — HYDROMORPHONE HYDROCHLORIDE 0.5 MG: 1 INJECTION, SOLUTION INTRAMUSCULAR; INTRAVENOUS; SUBCUTANEOUS at 08:58

## 2025-07-08 RX ADMIN — HYDROMORPHONE HYDROCHLORIDE 1 MG: 2 TABLET ORAL at 20:02

## 2025-07-08 RX ADMIN — HYDROMORPHONE HYDROCHLORIDE 2 MG: 2 TABLET ORAL at 13:20

## 2025-07-08 RX ADMIN — HYDROMORPHONE HYDROCHLORIDE 0.2 MG: 0.2 INJECTION, SOLUTION INTRAMUSCULAR; INTRAVENOUS; SUBCUTANEOUS at 22:40

## 2025-07-08 RX ADMIN — CEFEPIME HYDROCHLORIDE 2 G: 2 INJECTION, POWDER, FOR SOLUTION INTRAVENOUS at 02:04

## 2025-07-08 RX ADMIN — Medication 400 UNITS: at 08:35

## 2025-07-08 RX ADMIN — BUPROPION HYDROCHLORIDE 150 MG: 150 TABLET, FILM COATED, EXTENDED RELEASE ORAL at 08:36

## 2025-07-08 RX ADMIN — B-COMPLEX W/ C & FOLIC ACID TAB 1 TABLET: TAB at 08:36

## 2025-07-08 RX ADMIN — ACETAMINOPHEN 975 MG: 325 TABLET ORAL at 19:23

## 2025-07-08 RX ADMIN — ROPINIROLE HYDROCHLORIDE 0.5 MG: 0.25 TABLET, FILM COATED ORAL at 08:36

## 2025-07-08 RX ADMIN — ENOXAPARIN SODIUM 40 MG: 40 INJECTION SUBCUTANEOUS at 19:22

## 2025-07-08 RX ADMIN — ACETAMINOPHEN 975 MG: 325 TABLET ORAL at 12:43

## 2025-07-08 RX ADMIN — CITALOPRAM 40 MG: 40 TABLET ORAL at 08:35

## 2025-07-08 RX ADMIN — ROPINIROLE HYDROCHLORIDE 1 MG: 1 TABLET, FILM COATED ORAL at 22:32

## 2025-07-08 RX ADMIN — FERROUS SULFATE TAB 325 MG (65 MG ELEMENTAL FE) 325 MG: 325 (65 FE) TAB at 08:35

## 2025-07-08 RX ADMIN — HYDROMORPHONE HYDROCHLORIDE 2 MG: 2 TABLET ORAL at 02:12

## 2025-07-08 ASSESSMENT — ACTIVITIES OF DAILY LIVING (ADL)
ADLS_ACUITY_SCORE: 62
ADLS_ACUITY_SCORE: 58
ADLS_ACUITY_SCORE: 37
ADLS_ACUITY_SCORE: 62
ADLS_ACUITY_SCORE: 58
ADLS_ACUITY_SCORE: 58
ADLS_ACUITY_SCORE: 62
ADLS_ACUITY_SCORE: 58
ADLS_ACUITY_SCORE: 62
ADLS_ACUITY_SCORE: 58
DEPENDENT_IADLS:: INDEPENDENT
ADLS_ACUITY_SCORE: 58
ADLS_ACUITY_SCORE: 58
ADLS_ACUITY_SCORE: 39
ADLS_ACUITY_SCORE: 58
ADLS_ACUITY_SCORE: 62
ADLS_ACUITY_SCORE: 58

## 2025-07-08 NOTE — PHARMACY-VANCOMYCIN DOSING SERVICE
Pharmacy Vancomycin Initial Note  Date of Service 2025  Patient's  1945  79 year old, female    Indication: Skin and Soft Tissue Infection    Current estimated CrCl = Estimated Creatinine Clearance: 53.2 mL/min (A) (based on SCr of 1 mg/dL (H)).    Creatinine for last 3 days  No results found for requested labs within last 3 days.    Recent Vancomycin Level(s) for last 3 days  No results found for requested labs within last 3 days.      Vancomycin IV Administrations (past 72 hours)        No vancomycin orders with administrations in past 72 hours.                    Nephrotoxins and other renal medications (From now, onward)      Start     Dose/Rate Route Frequency Ordered Stop    25 2100  piperacillin-tazobactam (ZOSYN) 3.375 g vial to attach to  mL bag         3.375 g  over 30 Minutes Intravenous ONCE 25  vancomycin (VANCOCIN) 2,000 mg in sodium chloride 0.9 % 500 mL intermittent infusion         2,000 mg  over 2 Hours Intravenous ONCE 25              Contrast Orders - past 72 hours (72h ago, onward)      None               Plan:  Start vancomycin  2000 mg IV Once.   Re-consult pharmacy upon admission for continued vancomycin dosing, if indicated..      Pooja Davis, [facility]

## 2025-07-08 NOTE — H&P
[facility]    History and Physical - Hospitalist Service       Date of Admission:  7/7/2025    Assessment & Plan      Elizabeth Kelley is a 79 year old female admitted on 7/7/2025.     Principal Problem:    Bilateral cellulitis of lower leg  Active Problems:    Acquired lymphedema of leg    Depression    GERD (gastroesophageal reflux disease)    Essential hypertension    Menopausal and postmenopausal disorder    Morbid obesity -- BMI 48.8    Weakness generalized    Cellulitis of right lower extremity      79 year old female with a pertinent history of MS, anemia, edema, melanoma, lymphedema, chronic diastolic heart failure, sepsis, hypertensive HF, hypokalemia, osteoarthritis, CKD stage 3, GERD, hemorrhage, HTN, and malignant melanoma, who presents to this ED walk in for evaluation of cellulitis. Last week, patient reports sending a wound culture and was diagnosed with an infection-it was polymicrobial trang with antibiotic resistance with Pseudomonas Enterococcus. Now  patient reports cellulitis in both the right and left lower extremities. The extremities weep lymphedema fluid. She has had infections in the past, and therefore is on daily minocycline 50 mg. Patient denies being diabetic.  Started on Vanco cefepime and PICC line placement due to multiple bacteria with resistance pattern.  Will admit for further evaluation antibiotics.    -Of note,was admitted 1/12 - 1/18/2025 for RIGHT lower extremity cellulitis and sepsis, treated with meropenem and vancomycin, and she was discharged on doxycycline.  She was again hospitalized 3/12 - 3/25/25 for LEFT lower extremity cellulitis. She was treated with Vanco and Zosyn/ceftriaxone and discharged with Keflex.       Bilateral cellulitis of lower leg  Chronic Lower Extremity Lymphedema  Chronic Venous Stasis  Chronic Right Lower Extremity Wounds  Clinically not septic will given cefepime and will consult ID, leg elevation, patient has chronic pain  that she takes as needed Dilaudid   DVT ruled out   continue lymphedema wraps and wound care        Generalized Weakness, Physical Deconditioning   PT/O when able    Chronic Anemia  Hiatal Hernia with Brian Lesions   EGD on 3/21/25 showed sizable hiatal hernia with Brian lesions with oozing.   Hg stable       Restless leg syndrome - continue PTA ropinirole  Anxiety depression - continue PTA Wellbutrin and Celexa  Hyperlipidemia - continue PTA statin        Initial orders were placed.  Workup and follow-up labs has been placed.   Current problem list also has been updated.  Anticipated length of stay of more than 2 midnights of hospitalization depending on progression, planning,findings,further testing or treatment needs. Services are medically necessary for evaluation and treatment of the acute & on chronic superimposed conditions if applicable with the treatment plan as outlined above.    More than 50% of time spent in Counselling & coordination of care.  Disposition:   Pending on further clinical progression, further evaluation findings and recommendations.          Diet: Combination Diet Regular Diet Adult    DVT Prophylaxis: Heparin SQ  Amato Catheter: Not present  Lines: PRESENT           Cardiac Monitoring: None  Code Status: Full Code      Clinically Significant Risk Factors Present on Admission                   # Hypertension: Noted on problem list      # Anemia: based on hgb <11       # Morbid Obesity: Estimated body mass index is 47.56 kg/m  as calculated from the following:    Height as of this encounter: 1.524 m (5').    Weight as of this encounter: 110.5 kg (243 lb 8 oz).         # Financial/Environmental Concerns:           Disposition Plan     Medically Ready for Discharge: Anticipated in 2-4 Days           Gautam Inman MD  Hospitalist Service  [facility]  Securely message with [employer] (more info)  Text page via [employer] Paging/Directory      ______________________________________________________________________    Chief Complaint   Leg pain    History is obtained from the patient, electronic health record, and emergency department physician    History of Present Illness   Elizabeth Kelley is a 79 year old female with a pertinent history of MS, anemia, edema, melanoma, lymphedema, chronic diastolic heart failure, sepsis, hypertensive HF, hypokalemia, osteoarthritis, CKD stage 3, GERD, hemorrhage, HTN, and malignant melanoma, who presents to this ED walk in for evaluation of cellulitis. Last week, patient reports sending a wound culture and was diagnosed with an infection-it was polymicrobial trang with antibiotic resistance with Pseudomonas Enterococcus. Now  patient reports cellulitis in both the right and left lower extremities. The extremities weep lymphedema fluid. She has had infections in the past, and therefore is on daily minocycline 50 mg. Patient denies being diabetic.  Started on Vanco cefepime and PICC line placement due to multiple bacteria with resistance pattern.  Will admit for further evaluation antibiotics.    -Of note, from 3/26-3/29, patient was admitted to [facility] for renal failure and cellulitis. She does not identify any waxing or waning symptoms otherwise, exacerbating or alleviating features, associated symptoms except as mentioned. Patient denies any pain related complaints.      Past Medical History    Past Medical History:   Diagnosis Date    Ankle contracture, left     Class 3 severe obesity due to excess calories without serious comorbidity with body mass index (BMI) of 45.0 to 49.9 in adult (H)     Combined gastric and duodenal ulcer     Controlled substance agreement broken     Depression     Dyslipidemia     Edema     Edema     Gait abnormality     GERD (gastroesophageal reflux disease)     Gout     Lymphedema of both lower extremities     Melanoma (H)     left upper arm    MS (multiple sclerosis) (H)      RLS (restless legs syndrome)     Skin ulcer of left lower leg (H)     Valgus deformity of both feet     Vitamin D deficiency        Past Surgical History   Past Surgical History:   Procedure Laterality Date    ABLATION SAPHENOUS VEIN W/ RFA Right 04/12/2021    Saphenous vein, anterior accessory saphenous vein, sclerotherapy of multiple veins.    COSMETIC SURGERY  1988    reduction of excess skin from weight loss    ESOPHAGOSCOPY, GASTROSCOPY, DUODENOSCOPY (EGD), COMBINED N/A 03/30/2015    Procedure: UPPER ENDOSCOPY with gastric biopsy;  Surgeon: Checo Fletcher MD;  Location: [facility];  Service:     ESOPHAGOSCOPY, GASTROSCOPY, DUODENOSCOPY (EGD), COMBINED N/A 3/21/2025    Procedure: ESOPHAGOGASTRODUODENOSCOPY;  Surgeon: Mazin Rome DO;  Location: [facility] OR    IRRIGATION AND DEBRIDEMENT LOWER EXTREMITY, COMBINED Right 3/21/2022    Procedure: RIGHT LEG WOUND DEBRIDEMENT, PAULIE DERMATONE, MISONIX, VAC VERA FLOW WITH VASHE;  Surgeon: Gabriele Bullock MD;  Location:  OR    IRRIGATION AND DEBRIDEMENT LOWER EXTREMITY, COMBINED Right 3/28/2022    Procedure: DEBRIDEMENT AND WOUND DRESSING CHANGE AND MYRIAD APPLICATION OF RIGHT LOWER LEG WOUND;  Surgeon: Gabriele Bullock MD;  Location: [facility] OR    MAMMOPLASTY REDUCTION Bilateral     MOHS MICROGRAPHIC PROCEDURE      left upper arm, melanoma    PICC DOUBLE LUMEN PLACEMENT  7/21/2024    PICC DOUBLE LUMEN PLACEMENT  3/20/2025    PICC SINGLE LUMEN PLACEMENT  8/13/2021         PICC SINGLE LUMEN PLACEMENT  9/2/2021         PICC SINGLE LUMEN PLACEMENT  9/8/2024    PICC SINGLE LUMEN PLACEMENT  1/14/2025    SPINE SURGERY      L5 area surgery, decompression       Prior to Admission Medications   Prior to Admission Medications   Prescriptions Last Dose Informant Patient Reported? Taking?   Carboxymethylcellulose Sodium 0.25 % SOLN   Yes No   Sig: Apply 1 drop to eye daily as needed   HYDROmorphone (DILAUDID) 2 MG tablet   No No   Sig: Take 0.5-1 tablets (1-2  mg) by mouth every 6 hours as needed for moderate to severe pain.   Wound Cleansers (VASHE WOUND THERAPY EX)   Yes No   Sig: Externally apply topically daily as needed   acetaminophen (TYLENOL) 500 MG tablet   Yes No   Sig: Take 1,000 mg by mouth every 6 hours as needed for pain.   ascorbic acid (VITAMIN C) 250 MG CHEW chewable tablet   Yes No   Sig: Take 250 mg by mouth daily.   buPROPion (WELLBUTRIN SR) 150 MG 12 hr tablet  Self Yes No   Sig: Take 150 mg by mouth every morning    citalopram (CELEXA) 40 MG tablet  Self Yes No   Sig: Take 40 mg by mouth daily (with lunch)   diclofenac (VOLTAREN) 1 % topical gel   Yes No   Sig: Apply 2 g topically 4 times daily as needed for moderate pain.   ferrous sulfate (FEROSUL) 325 (65 Fe) MG tablet   Yes No   Sig: Take 325 mg by mouth every other day.   furosemide (LASIX) 20 MG tablet   No No   Sig: Take 3 tablets (60 mg) by mouth daily.   gabapentin (NEURONTIN) 100 MG capsule   Yes No   Sig: Take 100 mg by mouth 3 times daily   lactobacillus rhamnosus, GG, (CULTURELL) capsule   No No   Sig: Take 1 capsule by mouth 2 times daily.   magnesium oxide (MAG-OX) 400 MG tablet   Yes No   Sig: Take 1 tablet by mouth every morning   minocycline (DYNACIN) 50 MG tablet   No No   Sig: Take 1 tablet (50 mg) by mouth daily.   multivitamin w/minerals (CENTRUM ADULTS) tablet  Self Yes No   Sig: Take 1 tablet by mouth every morning   nitroGLYcerin (NITROSTAT) 0.4 MG sublingual tablet   Yes No   Sig: PLACE 1 TABLET UNDER TONGUE EVERY 5 MINTUES AS NEEDED FOR CHEST PAIN FOR UP TO 30 DAYS   nystatin (MYCOSTATIN) 376843 UNIT/GM external powder   Yes No   Sig: Apply topically 2 times daily as needed.   pantoprazole (PROTONIX) 40 MG EC tablet   No No   Sig: Take 1 tablet (40 mg) by mouth 2 times daily.   potassium chloride ER (K-TAB) 20 MEQ CR tablet   Yes No   Sig: Take 20 mEq by mouth 2 times daily.   rOPINIRole (REQUIP) 1 MG tablet   Yes No   Sig: Take 0.5 mg by mouth 2 times daily. Take one-half  tablet (dose = 0.5 mg) twice daily. Once in the morning and once about dinnertime.     In addition, take one tablet (1 mg) at bedtime.   simvastatin (ZOCOR) 40 MG tablet  Self Yes No   Sig: [SIMVASTATIN (ZOCOR) 40 MG TABLET] Take 40 mg by mouth bedtime.   spironolactone (ALDACTONE) 25 MG tablet  Self Yes No   Sig: Take 25 mg by mouth every morning   vitamin B-Complex   Yes No   Sig: Take 1 tablet by mouth daily.   vitamin D3 (CHOLECALCIFEROL) 250 mcg (22698 units) capsule   Yes No   Sig: Take 1 capsule by mouth daily.   zinc 50 MG TABS   Yes No   Sig: Take 1 tablet by mouth three times a week. On , Sat      Facility-Administered Medications: None        Review of Systems    The 5 point Review of Systems is negative other than noted in the HPI or here.     Social History   I have reviewed this patient's social history and updated it with pertinent information if needed.  Social History     Tobacco Use    Smoking status: Former     Current packs/day: 0.00     Average packs/day: 0.3 packs/day for 12.0 years (3.0 ttl pk-yrs)     Types: Cigarettes     Start date: 1963     Quit date: 1975     Years since quittin.5    Smokeless tobacco: Never    Tobacco comments:     quit more than 30 years ago   Substance Use Topics    Alcohol use: Yes     Alcohol/week: 1.0 standard drink of alcohol     Types: 1 Standard drinks or equivalent per week     Comment: glass of wine once a week    Drug use: No         Family History   I have reviewed this patient's family history and updated it with pertinent information if needed.  Family History   Problem Relation Age of Onset    Uterine Cancer Mother     Hyperlipidemia Mother     Hypertension Mother     Multiple Sclerosis Mother     Asthma Sister     Obesity Sister     Hyperlipidemia Sister     Hypertension Sister     Multiple Sclerosis Sister     Arthritis Sister     Colon Cancer Paternal Aunt     Breast Cancer Paternal Aunt     Hypertension Paternal Aunt      Hyperlipidemia Paternal Aunt     Brain Cancer Father     Arthritis Maternal Uncle     Arthritis Maternal Grandmother     Early Death Maternal Grandfather     Arthritis Paternal Grandmother     Arthritis Paternal Grandfather          Allergies   Allergies   Allergen Reactions    Other Environmental Allergy Anaphylaxis     Bees/Wasps Stings  Bees/Wasps Stings    Adhesive Tape Other (See Comments)     Red,itchy and oozes  Red,itchy and oozes    Adhesive [Cyanoacrylate] Unknown     Other reaction(s): sores    Latex Hives    Ragweeds Itching    Oxycodone-Acetaminophen Rash    Vicodin [Hydrocodone-Acetaminophen] Nausea and Vomiting and Hives        Physical Exam   Vital Signs: Temp: 98  F (36.7  C) Temp src: Tympanic BP: (!) 163/70 Pulse: 70   Resp: 20 SpO2: 97 %      Weight: 243 lbs 8 oz    Alert awake  Vision Baseline  Neck supple  Oral mucosa moist  bilateral air entry heard,  S1-S2 normal  Abdomen is soft no tenderness  Extremities -+visible swelling noted- redness/warmath  No skin cyanosis  Neurologically- no new Gross deficits from baseline-Moving all 4 extremities  Psych-mood okay and appropriate to circumstances      Medical Decision Making       75 MINUTES SPENT BY ME on the date of service doing chart review, history, exam, documentation & further activities per the note.      Data     I have personally reviewed the following data over the past 24 hrs:    8.5  \   10.4 (L)   / 375     138 103 29.6 (H) /  99   5.0 25 0.92 \     Trop: N/A BNP: 125     Procal: N/A CRP: 57.20 (H) Lactic Acid: N/A         Imaging results reviewed over the past 24 hrs:   Recent Results (from the past 24 hours)   XR Chest Port 1 View    Narrative    EXAM: XR CHEST PORT 1 VIEW  LOCATION: [facility]  DATE: 7/7/2025    INDICATION: Increased edema.  COMPARISON: 01/12/2025.      Impression    IMPRESSION: Cardiomediastinal silhouette within normal limits. No focal consolidation or pleural effusion. Mild elevation  right hemidiaphragm. Degenerative change osseous structures.   US Lower Extremity Venous Duplex Bilateral    Narrative    EXAM: US LOWER EXTREMITY VENOUS DUPLEX BILATERAL  LOCATION: [facility]  DATE: 7/7/2025    INDICATION: Bilateral leg swelling and cellulitis.  COMPARISON: Lower extremity venous on 3/12/2025.  TECHNIQUE: Venous Duplex ultrasound of bilateral lower extremities with and without compression, augmentation and duplex. Color flow and spectral Doppler with waveform analysis performed.    FINDINGS: Technically challenging exam due to patient's body habitus and presence of soft tissue edema. Within this limitation, there is:    Exam includes the common femoral, femoral, popliteal veins as well as segmentally visualized deep calf veins and greater saphenous vein.     RIGHT: No deep vein thrombosis, although the calf veins are not visualized due to presence of overlying wound dressing. No superficial thrombophlebitis. No popliteal cyst.    LEFT: No deep vein thrombosis. No superficial thrombophlebitis. There is 1.8 x 5.4 x 1.5 cm mildly complex collection in the popliteal fossa, likely represents Baker's cyst.      Impression    IMPRESSION:  1.  No deep venous thrombosis in the bilateral lower extremities.  2.  5.4 cm mildly complex collection in the left popliteal fossa, likely represents Baker's cyst.     XR Chest Port 1 View    Narrative    EXAM: XR CHEST PORT 1 VIEW  LOCATION: [facility]  DATE: 7/8/2025    INDICATION: PICC placement  COMPARISON: None.      Impression    IMPRESSION:   1.  Patient rotation to the right accounts for altered mediastinal contours.  2.  Left PICC tip in lower SVC. No pneumothorax.  3.  Mild interstitial pulmonary edema without pleural effusion.  4.  Normal heart size.

## 2025-07-08 NOTE — CONSULTS
[facility] ACUTE PAIN SERVICE CONSULTATION   [facility], [facility], [facility], [facility] [place], [place]     Date of Admission:  7/7/2025  Date of Consult (When I saw the patient): 07/08/25  Physician requesting consult: Consult   Assessment/Plan:     Elizabeth Kelley is a 79 year old female who was admitted on 7/7/2025.  Pain team was asked to see the patient for severe leg pain, cellulitis.  Admitted for bilateral lower leg cellulitis, chronic bilateral lower leg lymphedema.. History of  MS, anemia, edema, melanoma, lymphedema, chronic diastolic heart failure, sepsis, hypertensive HF, hypokalemia, OA, CKD3, GERD, Hx hemorrhage. Infectious Disease consulted for cellulitis.  Describes pain as 6-8/10 and aching, burning, throbbing, sore in the BLEs. The patient does not smoke and denies chemical dependency history.       Opioid Induced Respiratory Depression Risk Assessment:  The patient's home MME was about 5-10  mg daily.     PLAN:   1) Pain is consistent with bilateral leg pain d/t bilateral lower leg cellulitis  Multimodal Medication Therapy  Topical: none  NSAID'S: CrCl . 60.4 ml/min.  No NSAIDs   Steroids: none  Muscle Relaxants: none  Adjuvants: Tylenol 975 mg q 6 h, ropinirole scheduled, Gabapentin 100 mg tid - increase to 300 mg bid   Antidepressants/anxiolytics: citalopram 40 mg daily, Wellbutrin  mg q am, Atarax 10 mg q 6 h prn itching, anxiety, sleep, pain  Opioids: Dilaudid 1-2 mg q 3 h prn  IV Pain medication:  Dilaudid IV 0.2 mg q 6 h prn  Non-medication interventions: rest, PT  Constipation Prophylaxis: senna/docusate 1-2 bid, prn senna/docusate    -Opioid prescriber has been Dr. Herrmann, PCP  -MN  pulled from system on 7/8/25. This indicates prior opioid use.  06/24/25 oxycodone 5 mg #16  06/11/25 oxycodone 5 mg #7  06/04/25 gabapentin 100 mg #270(90 day supply)  05/32/25 oxycodone 5 mg #7  Discharge Recommendations - We recommend prescribing the following at the time of discharge: Will  determine.      History of Present Illness (HPI):       Elizabeth Kelley is a 79 year old female who presented for bilateral LE cellulitis.  Past medical history as above. The pain is reported to be acute on chronic pain, located in the lower extremities.     Per MN  review, the patient does have very mild opioid tolerance.     Reviewed medical record, labs, imaging, ED note, and care everywhere.     Past pain treatments have included: Pain Clinic-No, dilaudid, Oxycodone, Gabapentin    Medical History   PAST MEDICAL HISTORY:   Past Medical History:   Diagnosis Date    Ankle contracture, left     Class 3 severe obesity due to excess calories without serious comorbidity with body mass index (BMI) of 45.0 to 49.9 in adult (H)     Combined gastric and duodenal ulcer     Controlled substance agreement broken     Depression     Dyslipidemia     Edema     Edema     Gait abnormality     GERD (gastroesophageal reflux disease)     Gout     Lymphedema of both lower extremities     Melanoma (H)     left upper arm    MS (multiple sclerosis) (H)     RLS (restless legs syndrome)     Skin ulcer of left lower leg (H)     Valgus deformity of both feet     Vitamin D deficiency        PAST SURGICAL HISTORY:   Past Surgical History:   Procedure Laterality Date    ABLATION SAPHENOUS VEIN W/ RFA Right 04/12/2021    Saphenous vein, anterior accessory saphenous vein, sclerotherapy of multiple veins.    COSMETIC SURGERY  1988    reduction of excess skin from weight loss    ESOPHAGOSCOPY, GASTROSCOPY, DUODENOSCOPY (EGD), COMBINED N/A 03/30/2015    Procedure: UPPER ENDOSCOPY with gastric biopsy;  Surgeon: Checo Fletcher MD;  Location: [facility];  Service:     ESOPHAGOSCOPY, GASTROSCOPY, DUODENOSCOPY (EGD), COMBINED N/A 3/21/2025    Procedure: ESOPHAGOGASTRODUODENOSCOPY;  Surgeon: Mazin Rome DO;  Location: [facility] OR    IRRIGATION AND DEBRIDEMENT LOWER EXTREMITY, COMBINED Right 3/21/2022    Procedure: RIGHT LEG  WOUND DEBRIDEMENT, PAULIE DERMATONE, MISONIX, VAC VERA FLOW WITH VASHE;  Surgeon: Gabriele Bullock MD;  Location: [facility] OR    IRRIGATION AND DEBRIDEMENT LOWER EXTREMITY, COMBINED Right 3/28/2022    Procedure: DEBRIDEMENT AND WOUND DRESSING CHANGE AND MYRIAD APPLICATION OF RIGHT LOWER LEG WOUND;  Surgeon: Gabriele Bullock MD;  Location: [facility] OR    MAMMOPLASTY REDUCTION Bilateral     MOHS MICROGRAPHIC PROCEDURE      left upper arm, melanoma    PICC DOUBLE LUMEN PLACEMENT  2024    PICC DOUBLE LUMEN PLACEMENT  3/20/2025    PICC SINGLE LUMEN PLACEMENT  2021         PICC SINGLE LUMEN PLACEMENT  2021         PICC SINGLE LUMEN PLACEMENT  2024    PICC SINGLE LUMEN PLACEMENT  2025    SPINE SURGERY      L5 area surgery, decompression       FAMILY HISTORY:   Family History   Problem Relation Age of Onset    Uterine Cancer Mother     Hyperlipidemia Mother     Hypertension Mother     Multiple Sclerosis Mother     Asthma Sister     Obesity Sister     Hyperlipidemia Sister     Hypertension Sister     Multiple Sclerosis Sister     Arthritis Sister     Colon Cancer Paternal Aunt     Breast Cancer Paternal Aunt     Hypertension Paternal Aunt     Hyperlipidemia Paternal Aunt     Brain Cancer Father     Arthritis Maternal Uncle     Arthritis Maternal Grandmother     Early Death Maternal Grandfather     Arthritis Paternal Grandmother     Arthritis Paternal Grandfather        SOCIAL HISTORY:   Social History     Tobacco Use    Smoking status: Former     Current packs/day: 0.00     Average packs/day: 0.3 packs/day for 12.0 years (3.0 ttl pk-yrs)     Types: Cigarettes     Start date: 1963     Quit date: 1975     Years since quittin.5    Smokeless tobacco: Never    Tobacco comments:     quit more than 30 years ago   Substance Use Topics    Alcohol use: Yes     Alcohol/week: 1.0 standard drink of alcohol     Types: 1 Standard drinks or equivalent per week     Comment: glass of wine once a week         [employer]  Tobacco:  reports that she quit smoking about 49 years ago. Her smoking use included cigarettes. She started smoking about 61 years ago. She has a 3 pack-year smoking history. She has never used smokeless tobacco.  Alcohol:  reports current alcohol use of about 1.0 standard drink of alcohol per week.  Illicit drugs:  reports no history of drug use.    Allergies  Allergies   Allergen Reactions    Other Environmental Allergy Anaphylaxis     Bees/Wasps Stings  Bees/Wasps Stings    Adhesive Tape Other (See Comments)     Red,itchy and oozes  Red,itchy and oozes    Adhesive [Cyanoacrylate] Unknown     Other reaction(s): sores    Latex Hives    Ragweeds Itching    Oxycodone-Acetaminophen Rash    Vicodin [Hydrocodone-Acetaminophen] Nausea and Vomiting and Hives       Problem List  Patient Active Problem List    Diagnosis Date Noted    Cellulitis of right lower extremity 07/08/2025     Priority: Medium    Bilateral cellulitis of lower leg 07/08/2025     Priority: Medium    Chronic kidney disease, stage 3a (H) 04/03/2025     Priority: Medium    Chronic blood loss anemia 03/30/2025     Priority: Medium    Hiatal hernia 03/30/2025     Priority: Medium    Hemorrhage due to chronic Brian lesion 03/30/2025     Priority: Medium    Weakness generalized 03/26/2025     Priority: Medium    Diarrhea 03/26/2025     Priority: Medium    KIKE (acute kidney injury) 03/13/2025     Priority: Medium    Cellulitis of left lower extremity 03/12/2025     Priority: Medium    Hypoxia 01/12/2025     Priority: Medium    Elevated troponin 01/12/2025     Priority: Medium    Chronic ulcer of right leg (H) 01/12/2025     Priority: Medium    Cellulitis of right lower leg 01/12/2025     Priority: Medium    Acute sepsis (H) 01/12/2025     Priority: Medium    Cellulitis 09/04/2024     Priority: Medium    Impaired glucose tolerance 07/21/2022     Priority: Medium    Iron deficiency anemia 07/21/2022     Priority: Medium     Snoring 07/21/2022     Priority: Medium    Pain associated with wound 03/21/2022     Priority: Medium    Chronic pain 03/10/2022     Priority: Medium    Ulcer of right lower extremity with fat layer exposed (H) 03/10/2022     Priority: Medium    History of malignant melanoma of skin 07/05/2021     Priority: Medium    Edema 07/05/2021     Priority: Medium    Major depression, single episode, in complete remission 07/05/2021     Priority: Medium    Menopausal and postmenopausal disorder 07/05/2021     Priority: Medium    Mixed hyperlipidemia 07/05/2021     Priority: Medium    Morbid obesity -- BMI 48.8 07/05/2021     Priority: Medium    Peripheral venous insufficiency 07/05/2021     Priority: Medium    Postmenopausal 07/05/2021     Priority: Medium    Restless legs 07/05/2021     Priority: Medium    Depression 04/28/2021     Priority: Medium    Vitamin D deficiency 04/28/2021     Priority: Medium    GERD (gastroesophageal reflux disease) 04/28/2021     Priority: Medium    Essential hypertension 04/28/2021     Priority: Medium    Valgus deformity of both feet 09/21/2016     Priority: Medium    Flat feet, bilateral 09/21/2016     Priority: Medium    Gait abnormality 09/21/2016     Priority: Medium    MS (multiple sclerosis) (H) 09/21/2016     Priority: Medium    Venous hypertension of lower extremity, bilateral 12/23/2015     Priority: Medium    Acquired lymphedema of leg 12/23/2015     Priority: Medium    Scar condition and fibrosis of skin 12/23/2015     Priority: Medium    Ankle contracture, left 12/23/2015     Priority: Medium       Prior to Admission Medications   (Not in a hospital admission)      Review of Systems  Complete ROS reviewed, unless noted in HPI, all other systems reviewed (with patient) and all others found to be negative.      Objective:     Physical Exam:  BP (!) 145/75   Pulse 81   Temp 98.2  F (36.8  C) (Oral)   Resp 18   Ht 1.524 m (5')   Wt 110.5 kg (243 lb 8 oz)   SpO2 94%   BMI 47.56  kg/m    Weight:   Vitals:    07/07/25 1712   Weight: 110.5 kg (243 lb 8 oz)      Body mass index is 47.56 kg/m .    General Appearance:  Alert, cooperative, no distress,  resting in bed    Head:  Normocephalic, without obvious abnormality, atraumatic   Eyes:  PERRL, conjunctiva/corneas clear, EOM's intact   ENT/Throat: Lips, mucosa, and tongue normal; teeth and gums normal   Lymph/Neck: Supple, symmetrical, trachea midline   Lungs:   Clear to auscultation bilaterally, respirations unlabored   Chest Wall:  No tenderness or deformity   Cardiovascular/Heart:  Regular rate and rhythm, S1, S2 normal    Abdomen:   Soft, non-tender, bowel sounds active all four quadrants,  obese   Musculoskeletal: BLE with erythema, RLE covered with dressing, LUE PICC line, chronic lymphedema    Skin: Skin warm, dry    Neurologic: Alert and oriented X 3, Moves all 4 extremities      Psych: Affect is appropriate      Imaging: Reviewed I have personally reviewed pertinent notes, labs, tests, and radiologic imaging in patient's chart.  Labs: Reviewed I have personally reviewed pertinent notes, labs, tests, and radiologic imaging in patient's chart.  Notes: Reviewed I have personally reviewed pertinent notes, labs, tests, and radiologic imaging in patient's chart.    Total time spent 65 minutes with greater than 50% in consultation, education and coordination of care.   Also discussed with Pharmacist.   Treatment plan includes: multimodal pain approach, Hospital Medicine Service for medical management, ID, WOC.   Patient educated regarding: multimodal pain approach and medications as listed above.   Elements of Medical Decision Making as described above. Acute or chronic illness or injury or surgery. High risk therapy including opioids, high risk drug therapy including oral and/or parenteral controlled substances.    Patient is understanding of the plan. All questions and concerns addressed to patient's satisfaction.     Thank you for this  consultation.    PETER ConnorP-C  Acute Care Inpatient Pain Management Program  [facility] ([facility])  Hours of coverage Monday-Friday [id number]. After hours please contact Primary team   Page via Epic chat or Impliant

## 2025-07-08 NOTE — PHARMACY-VANCOMYCIN DOSING SERVICE
Pharmacy Vancomycin Initial Note  Date of Service 2025  Patient's  1945  79 year old, female    Indication: Skin and Soft Tissue Infection    Current estimated CrCl = Estimated Creatinine Clearance: 57.8 mL/min (based on SCr of 0.92 mg/dL).    Creatinine for last 3 days  2025: 11:08 PM Creatinine 0.92 mg/dL    Recent Vancomycin Level(s) for last 3 days  No results found for requested labs within last 3 days.      Vancomycin IV Administrations (past 72 hours)                     vancomycin (VANCOCIN) 2,000 mg in sodium chloride 0.9 % 500 mL intermittent infusion (mg) 2,000 mg New Bag 25 0250                    Nephrotoxins and other renal medications (From now, onward)      Start     Dose/Rate Route Frequency Ordered Stop    25 0400  vancomycin (VANCOCIN) 1,250 mg in sodium chloride 0.9 % 250 mL intermittent infusion         1,250 mg  over 90 Minutes Intravenous EVERY 24 HOURS 25 0637              Contrast Orders - past 72 hours (72h ago, onward)      None            [employer] Prediction of Planned Initial Vancomycin Regimen    Loading dose: N/A  Regimen: 1250 mg IV every 24 hours.  Start time: 02:50 on 2025  Exposure target: AUC24 (range) 400-600 mg/L.hr   AUC24,ss: 564 mg/L.hr  Probability of AUC24 > 400: 75 %  Ctrough,ss: 15.6 mg/L  Probability of Ctrough,ss > 20: 38 %  Probability of nephrotoxicity (Lodise LOUIS ): 11 %        Plan:  Start vancomycin  1250 mg IV q24h.   Vancomycin monitoring method: AUC  Vancomycin therapeutic monitoring goal: 400-600 mg*h/L  Pharmacy will check vancomycin levels as appropriate in 1-3 Days.    Serum creatinine levels will be ordered daily for the first week of therapy and at least twice weekly for subsequent weeks.      Cindi Garcia, PharmD

## 2025-07-08 NOTE — ED PROVIDER NOTES
EMERGENCY DEPARTMENT ENCOUNTER      NAME: Elizabeth Kelley  AGE: 79 year old female  YOB: 1945  MRN: [id number]  EVALUATION DATE & TIME: No admission date for patient encounter.    PCP: Yung Herrmann    ED PROVIDER: Vish Stafford M.D.      Chief Complaint   Patient presents with    Cellulitis         FINAL IMPRESSION:  1.  Bilateral lower leg cellulitis.  2.  Chronic bilateral lower leg lymphedema.      ED COURSE & MEDICAL DECISION MAKIN:10 PM I met with the patient to gather history and to perform my initial exam. We discussed plans for the ED course, including diagnostic testing and treatment. PPE worn: cloth mask.  Patient with bilateral leg cellulitis.  Home health nurse saw her today and sent her in for IV antibiotics and admission.  She believes it has becoming worse.  Patient has a chronic nonhealing diabetic foot wound on the right and now has cellulitis all the way up to the knee.  To a lesser extent she has cellulitis at the left ankle.  10:09 PM.  EKG unremarkable.  Chest x-ray negative.  Everything else still pending.  Tentatively, patient will be signed out to the night ED physician around 11 PM.  PICC status coming for IV access and lab draw at approximate 11:30 PM.    Pertinent Labs & Imaging studies reviewed. (See chart for details)  79 year old female presents to the Emergency Department for evaluation of bilateral leg cellulitis    At the conclusion of the encounter I discussed the results of all of the tests and the disposition. The questions were answered. The patient or family acknowledged understanding and was agreeable with the care plan.              Medical Decision Making  Reviewed what the home health nurse said with the patient.  Reviewed history with the patient.  Computer inpatient outpatient history reviewed.    Evaluation pending.  Plan admission afterwards for IV antibiotics.  Will discuss with admitting service.    MIPS (CTPE, Dental pain, Amato, Sinusitis,  Asthma/COPD, Head Trauma): Not Applicable    SEPSIS: None          MEDICATIONS GIVEN IN THE EMERGENCY:  Medications   piperacillin-tazobactam (ZOSYN) 3.375 g vial to attach to  mL bag (has no administration in time range)   vancomycin (VANCOCIN) 2,000 mg in sodium chloride 0.9 % 500 mL intermittent infusion (has no administration in time range)       NEW PRESCRIPTIONS STARTED AT TODAY'S ER VISIT  New Prescriptions    No medications on file          =================================================================    HPI    Patient information was obtained from: patient     Use of : N/A       Elizabeth Kelley is a 79 year old female with a pertinent history of MS, anemia, edema, melanoma, lymphedema, chronic diastolic heart failure, sepsis, hypertensive HF, hypokalemia, osteoarthritis, CKD stage 3, GERD, hemorrhage, HTN, and malignant melanoma, who presents to this ED walk in for evaluation of cellulitis.      Last week, patient reports sending a urine culture and was diagnosed with an infection. Patient notes being prescribed Vanco.  Today, patient reports cellulitis in both the right and left lower extremities. The extremities weep lymphedema fluid. She has had infections in the past, and therefore is on daily minocycline 50 mg. Patient denies being diabetic. Of note, from 3/26-3/29, patient was admitted to [facility] for renal failure and cellulitis. She does not identify any waxing or waning symptoms otherwise, exacerbating or alleviating features, associated symptoms except as mentioned. Patient denies any pain related complaints.    REVIEW OF SYSTEMS   Review of Systems   Constitutional:  Negative for chills and fever.       PAST MEDICAL HISTORY:  Past Medical History:   Diagnosis Date    Ankle contracture, left     Class 3 severe obesity due to excess calories without serious comorbidity with body mass index (BMI) of 45.0 to 49.9 in adult (H)     Combined gastric and duodenal ulcer      Controlled substance agreement broken     Depression     Dyslipidemia     Edema     Edema     Gait abnormality     GERD (gastroesophageal reflux disease)     Gout     Lymphedema of both lower extremities     Melanoma (H)     left upper arm    MS (multiple sclerosis) (H)     RLS (restless legs syndrome)     Skin ulcer of left lower leg (H)     Valgus deformity of both feet     Vitamin D deficiency        PAST SURGICAL HISTORY:  Past Surgical History:   Procedure Laterality Date    ABLATION SAPHENOUS VEIN W/ RFA Right 04/12/2021    Saphenous vein, anterior accessory saphenous vein, sclerotherapy of multiple veins.    COSMETIC SURGERY  1988    reduction of excess skin from weight loss    ESOPHAGOSCOPY, GASTROSCOPY, DUODENOSCOPY (EGD), COMBINED N/A 03/30/2015    Procedure: UPPER ENDOSCOPY with gastric biopsy;  Surgeon: Checo Fletcher MD;  Location: [facility];  Service:     ESOPHAGOSCOPY, GASTROSCOPY, DUODENOSCOPY (EGD), COMBINED N/A 3/21/2025    Procedure: ESOPHAGOGASTRODUODENOSCOPY;  Surgeon: Mazin Rome DO;  Location: [facility] OR    IRRIGATION AND DEBRIDEMENT LOWER EXTREMITY, COMBINED Right 3/21/2022    Procedure: RIGHT LEG WOUND DEBRIDEMENT, PAULIE DERMATONE, MISONIX, VAC VERA FLOW WITH VASHE;  Surgeon: Gabriele Bullock MD;  Location:  OR    IRRIGATION AND DEBRIDEMENT LOWER EXTREMITY, COMBINED Right 3/28/2022    Procedure: DEBRIDEMENT AND WOUND DRESSING CHANGE AND MYRIAD APPLICATION OF RIGHT LOWER LEG WOUND;  Surgeon: Gabriele Bullock MD;  Location:  OR    MAMMOPLASTY REDUCTION Bilateral     MOHS MICROGRAPHIC PROCEDURE      left upper arm, melanoma    PICC DOUBLE LUMEN PLACEMENT  7/21/2024    PICC DOUBLE LUMEN PLACEMENT  3/20/2025    PICC SINGLE LUMEN PLACEMENT  8/13/2021         PICC SINGLE LUMEN PLACEMENT  9/2/2021         PICC SINGLE LUMEN PLACEMENT  9/8/2024    PICC SINGLE LUMEN PLACEMENT  1/14/2025    SPINE SURGERY      L5 area surgery, decompression           CURRENT MEDICATIONS:     acetaminophen (TYLENOL) 500 MG tablet  ascorbic acid (VITAMIN C) 250 MG CHEW chewable tablet  buPROPion (WELLBUTRIN SR) 150 MG 12 hr tablet  Carboxymethylcellulose Sodium 0.25 % SOLN  citalopram (CELEXA) 40 MG tablet  diclofenac (VOLTAREN) 1 % topical gel  ferrous sulfate (FEROSUL) 325 (65 Fe) MG tablet  furosemide (LASIX) 20 MG tablet  gabapentin (NEURONTIN) 100 MG capsule  HYDROmorphone (DILAUDID) 2 MG tablet  lactobacillus rhamnosus, GG, (CULTURELL) capsule  magnesium oxide (MAG-OX) 400 MG tablet  minocycline (DYNACIN) 50 MG tablet  multivitamin w/minerals (CENTRUM ADULTS) tablet  nitroGLYcerin (NITROSTAT) 0.4 MG sublingual tablet  nystatin (MYCOSTATIN) 356381 UNIT/GM external powder  pantoprazole (PROTONIX) 40 MG EC tablet  potassium chloride ER (K-TAB) 20 MEQ CR tablet  rOPINIRole (REQUIP) 1 MG tablet  simvastatin (ZOCOR) 40 MG tablet  spironolactone (ALDACTONE) 25 MG tablet  vitamin B-Complex  vitamin D3 (CHOLECALCIFEROL) 250 mcg (69683 units) capsule  Wound Cleansers (VASHE WOUND THERAPY EX)  zinc 50 MG TABS        ALLERGIES:  Allergies   Allergen Reactions    Other Environmental Allergy Anaphylaxis     Bees/Wasps Stings  Bees/Wasps Stings    Adhesive Tape Other (See Comments)     Red,itchy and oozes  Red,itchy and oozes    Adhesive [Cyanoacrylate] Unknown     Other reaction(s): sores    Latex Hives    Ragweeds Itching    Oxycodone-Acetaminophen Rash    Vicodin [Hydrocodone-Acetaminophen] Nausea and Vomiting and Hives       FAMILY HISTORY:  Family History   Problem Relation Age of Onset    Uterine Cancer Mother     Hyperlipidemia Mother     Hypertension Mother     Multiple Sclerosis Mother     Asthma Sister     Obesity Sister     Hyperlipidemia Sister     Hypertension Sister     Multiple Sclerosis Sister     Arthritis Sister     Colon Cancer Paternal Aunt     Breast Cancer Paternal Aunt     Hypertension Paternal Aunt     Hyperlipidemia Paternal Aunt     Brain Cancer Father     Arthritis Maternal Uncle      Arthritis Maternal Grandmother     Early Death Maternal Grandfather     Arthritis Paternal Grandmother     Arthritis Paternal Grandfather        SOCIAL HISTORY:   Social History     Socioeconomic History    Marital status:      Spouse name: None    Number of children: None    Years of education: None    Highest education level: None   Tobacco Use    Smoking status: Former     Current packs/day: 0.00     Average packs/day: 0.3 packs/day for 12.0 years (3.0 ttl pk-yrs)     Types: Cigarettes     Start date: 1963     Quit date: 1975     Years since quittin.5    Smokeless tobacco: Never    Tobacco comments:     quit more than 30 years ago   Substance and Sexual Activity    Alcohol use: Yes     Alcohol/week: 1.0 standard drink of alcohol     Types: 1 Standard drinks or equivalent per week     Comment: glass of wine once a week    Drug use: No    Sexual activity: Yes     Partners: Male     Birth control/protection: Post-menopausal   Social History Narrative    .   Has significant other.  2 grown children.  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  (last updated 3/26/2025)      Social Drivers of Health     Financial Resource Strain: Low Risk  (3/27/2025)    Financial Resource Strain     Within the past 12 months, have you or your family members you live with been unable to get utilities (heat, electricity) when it was really needed?: No   Food Insecurity: Low Risk  (3/27/2025)    Food Insecurity     Within the past 12 months, did you worry that your food would run out before you got money to buy more?: No     Within the past 12 months, did the food you bought just not last and you didn t have money to get more?: No   Transportation Needs: Low Risk  (3/27/2025)    Transportation Needs     Within the past 12 months, has lack of transportation kept you from medical appointments, getting your medicines, non-medical meetings or  appointments, work, or from getting things that you need?: No   Interpersonal Safety: Low Risk  (3/14/2025)    Interpersonal Safety     Do you feel physically and emotionally safe where you currently live?: Yes     Within the past 12 months, have you been hit, slapped, kicked or otherwise physically hurt by someone?: No     Within the past 12 months, have you been humiliated or emotionally abused in other ways by your partner or ex-partner?: No   Housing Stability: Low Risk  (3/27/2025)    Housing Stability     Do you have housing? : Yes     Are you worried about losing your housing?: No     Former smoker.  No current drugs or tobacco.  Occasional alcohol.    VITALS:  BP (!) 163/70   Pulse 70   Temp 98  F (36.7  C)   Resp 20   Ht 1.524 m (5')   Wt 110.5 kg (243 lb 8 oz)   SpO2 97%   BMI 47.56 kg/m      PHYSICAL EXAM    Vital Signs:  BP (!) 163/70   Pulse 70   Temp 98  F (36.7  C)   Resp 20   Ht 1.524 m (5')   Wt 110.5 kg (243 lb 8 oz)   SpO2 97%   BMI 47.56 kg/m    General:  On entering the room she is in no apparent distress.  Morbid obesity is noted.  Neck:  Neck supple with full range of motion and nontender.    Back:  Back and spine are nontender.  No costovertebral angle tenderness.    HEENT:  Oropharynx clear with moist mucous membranes.  HEENT unremarkable.    Pulmonary:  Chest clear to auscultation without rhonchi rales or wheezing.    Cardiovascular:  Cardiac regular rate and rhythm without murmurs rubs or gallops.    Abdomen:  Abdomen soft nontender.  There is no rebound or guarding.    Muskuloskeletal:  She moves all 4 without any difficulty and has normal neurovascular exams.  Extremities without clubbing, cyanosis.  Bilateral leg edema, lymphedema, weeping, and cellulitis, right greater than left legs and calves are nontender.    Neuro:  She is alert and oriented ×3 and moves all extremities symmetrically.    Psych:  Normal affect.    Skin:  Unremarkable and warm and dry.       LAB:  All  pertinent labs reviewed and interpreted.  Labs Ordered and Resulted from Time of ED Arrival to Time of ED Departure - No data to display    RADIOLOGY:  Reviewed all pertinent imaging. Please see official radiology report.  XR Chest Port 1 View   Preliminary Result   IMPRESSION: Cardiomediastinal silhouette within normal limits. No focal consolidation or pleural effusion. Mild elevation right hemidiaphragm. Degenerative change osseous structures.      US Lower Extremity Venous Duplex Bilateral    (Results Pending)              EKG:          PROCEDURES:   None.      I, Tiffany Long, am serving as a scribe to document services personally performed by Dr. Stafford based on my observation and the provider's statements to me. I, Vish Stafford MD attest that Tiffany Long is acting in a scribe capacity, has observed my performance of the services and has documented them in accordance with my direction.    Vish Stafford M.D.  Emergency Medicine  [facility] EMERGENCY DEPARTMENT  [address] [place] [id number] [phone]  Dept: [phone]       Vish Stafford MD  07/07/25 [id number]

## 2025-07-08 NOTE — DISCHARGE INSTRUCTIONS
BLE (RLE - from knee to ankle; LLE medial ankle area)  Cleanse with Vashe (moisten gauze roll with Vashe and wrap each leg from ankle to knee - leave in place 15 - 30 minutes); let air dry after removing gauze.  Apply Critic Aid Paste to intact skin on periwound area.  Cover with Mextra, then secure with roll gauze.  OK to use Polymem instead of Mextra if it is available.

## 2025-07-08 NOTE — CONSULTS
Care Management Initial Consult    General Information  Assessment completed with: Patient,    Type of CM/SW Visit: Initial Assessment    Primary Care Provider verified and updated as needed: Yes   Readmission within the last 30 days: no previous admission in last 30 days      Reason for Consult: discharge planning  Advance Care Planning: Advance Care Planning Reviewed: present on chart          Communication Assessment  Patient's communication style: spoken language (English or Bilingual)             Cognitive  Cognitive/Neuro/Behavioral:                        Living Environment:   People in home: significant other     Current living Arrangements: house      Able to return to prior arrangements: yes       Family/Social Support:  Care provided by: self  Provides care for: no one     Support system:            Description of Support System:           Current Resources:   Patient receiving home care services: Yes  Skilled Home Care Services: Skilled Nursing     Community Resources: Home Care  Equipment currently used at home: cane, straight  Supplies currently used at home: Compression Stockings, Wound Care Supplies    Employment/Financial:  Employment Status: retired        Financial Concerns: none           Does the patient's insurance plan have a 3 day qualifying hospital stay waiver?  Yes     Which insurance plan 3 day waiver is available? Alternative insurance waiver    Will the waiver be used for post-acute placement? Undetermined at this time    Lifestyle & Psychosocial Needs:  Social Drivers of Health     Food Insecurity: Low Risk  (3/27/2025)    Food Insecurity     Within the past 12 months, did you worry that your food would run out before you got money to buy more?: No     Within the past 12 months, did the food you bought just not last and you didn t have money to get more?: No   Depression: Not on file   Housing Stability: Low Risk  (3/27/2025)    Housing Stability     Do you have housing? : Yes     Are  you worried about losing your housing?: No   Tobacco Use: Medium Risk (7/7/2025)    Patient History     Smoking Tobacco Use: Former     Smokeless Tobacco Use: Never     Passive Exposure: Not on file   Financial Resource Strain: Low Risk  (3/27/2025)    Financial Resource Strain     Within the past 12 months, have you or your family members you live with been unable to get utilities (heat, electricity) when it was really needed?: No   Alcohol Use: Not on file   Transportation Needs: Low Risk  (3/27/2025)    Transportation Needs     Within the past 12 months, has lack of transportation kept you from medical appointments, getting your medicines, non-medical meetings or appointments, work, or from getting things that you need?: No   Physical Activity: Not on file   Interpersonal Safety: Low Risk  (3/14/2025)    Interpersonal Safety     Do you feel physically and emotionally safe where you currently live?: Yes     Within the past 12 months, have you been hit, slapped, kicked or otherwise physically hurt by someone?: No     Within the past 12 months, have you been humiliated or emotionally abused in other ways by your partner or ex-partner?: No   Stress: Not on file   Social Connections: Not on file   Health Literacy: Not on file       Functional Status:  Prior to admission patient needed assistance:   Dependent ADLs:: Independent, Ambulation-cane  Dependent IADLs:: Independent       Mental Health Status:  Mental Health Status: No Current Concerns       Chemical Dependency Status:  Chemical Dependency Status: No Current Concerns             Values/Beliefs:  Spiritual, Cultural Beliefs, Lutheran Practices, Values that affect care: no               Discussed  Partnership in Safe Discharge Planning  document with patient/family: No    Additional Information:  CM met with pt in room to discuss discharge planning and complete initial assessment. Demographics confirmed and updated on facesheet.     Pt lives in a house with  S/O, Jaleel. Pt uses cane for ambulation. Pt is independent at baseline. Pt is open with [facility] for RN. Unknown discharge needs, pt goal is to return home with home care.    CM called [facility] and confirmed pt is open with them for home RN.    Next Steps: follow for needs and IV abx plan    Angelica Jose, [facility]  Acute Care Management  [facility]  For further questions/concerns you can reach us at Vocera Web Console

## 2025-07-08 NOTE — PROCEDURES
[facility]    Single Lumen PICC Placement    Date/Time: 7/7/2025 10:30 PM    Performed by: Abhay Pérez RN  Authorized by: Gautam Inman MD  Indications: vascular access      UNIVERSAL PROTOCOL   Site Marked: Yes  Prior Images Obtained and Reviewed:  Yes  Required items: Required blood products, implants, devices and special equipment available    Patient identity confirmed:  Verbally with patient and arm band  NA - No sedation, light sedation, or local anesthesia  Confirmation Checklist:  Patient's identity using two indicators, relevant allergies, procedure was appropriate and matched the consent or emergent situation and correct equipment/implants were available  Time out: Immediately prior to the procedure a time out was called    Universal Protocol: the Joint Commission Universal Protocol was followed    Preparation: Patient was prepped and draped in usual sterile fashion    ESBL (mL):  5     ANESTHESIA    Local Anesthetic:  Lidocaine 1% without epinephrine  Anesthetic Total (mL):  5      SEDATION    Patient Sedated: No        Preparation: skin prepped with 2% chlorhexidine  Skin prep agent: skin prep agent completely dried prior to procedure  Sterile barriers: maximum sterile barriers were used: cap, mask, sterile gown, sterile gloves, and large sterile sheet  Hand hygiene: hand hygiene performed prior to central venous catheter insertion  Type of line used: PICC  Catheter type: single lumen  Catheter size: 4 Fr  Brand: Bard  Lot number: [id number]  Placement method: venipuncture and ultrasound  Number of attempts: 2  Difficulty threading catheter: no  Successful placement: yes  Orientation: left  Catheter to Vein (%): 30  Location: basilic vein  Tip Location: SVC  Arm circumference: adults 15 cm  Extremity circumference: 37  Visible catheter length: 0  Total catheter length: 45  Internal Lumen Volume: 45 mL    Dressing and securement: blood cleaned with CHG, chlorhexidine disc  applied and statlock  Post procedure assessment: placement verified by x-ray  PROCEDURE   Disposal: sharps and needle count correct at the end of procedure, needles and guidewire disposed in sharps container  Patient Tolerance:  Patient tolerated the procedure well with no immediate complications

## 2025-07-08 NOTE — CONSULTS
[facility]  WO Nurse Inpatient Assessment     Consulted for: BLE    Summary: Patient with chronic lymphedema and recurrent cellulitis to BLE. LLE almost completely healed. Will continue OP treatment plan as able. Some supplies to be ordered, but will use comparable supplies until these are able to be obtained.    [facility] nurse follow-up plan: weekly    Patient History (according to provider note(s):    Assessment & Plan  79 year old female with a pertinent history of MS, anemia, edema, melanoma, lymphedema, chronic diastolic heart failure, sepsis, hypertensive HF, hypokalemia, osteoarthritis, CKD stage 3, GERD, hemorrhage, HTN, and malignant melanoma, who presents to this ED with bilateral cellulitis on legs     1.Bilateral cellulitis lower legs  -she notes she has been dealing with skin infections for years on her legs, much worse this week  -legs are more painful  -she was called by her clinic and told to come in due to swab cx results antibiotic resistance with Pseudomonas Enterococcus.   -was on daily minocycline 50 mg for prevention  - Vanco cefepime  IV  -ID to see  -woc to see     This year was on:  -Of note,was admitted 1/12 - 1/18/2025 for RIGHT lower extremity cellulitis and sepsis, treated with meropenem and vancomycin, and she was discharged on doxycycline.  She was again hospitalized 3/12 - 3/25/25 for LEFT lower extremity cellulitis. She was treated with Vanco and Zosyn/ceftriaxone and discharged with Keflex.      2.Chronic lymphedema  -sets her up for infections    Assessment:    Wound location: RLE    Last photo: No camera available at today's assessment  Wound due to: Venous Ulcer  Wound history/plan of care: Follows with Dr. Torres  Wound base: Wounds almost from ankle to knee - shorter on posterior leg (approximately 15 cm) and taller on anterior leg (approximately 25 cm); marbled slough/fibrinous tissue and pink, viable wound bed     Palpation of the wound bed: Painful  for patient - not assessed today      Drainage: small     Description of drainage: serosanguinous     Measurements (length x width x depth, in cm): See above     Tunneling: N/A     Undermining: N/A  Periwound skin: Dry/scaly and Hemosiderin staining      Color: pink and red      Temperature: normal   Odor: none  Pain: severe, as exposed to air for a significant amount of time  Pain interventions prior to dressing change: patient tolerated well and slow and gentle cares   Treatment goal: Heal , Drainage control, Infection control/prevention, Maintain (prevention of deterioration), and Protection  STATUS: initial assessment this hospitalization  Supplies ordered: ordered Vashe, Mextra, Polymem and Critic Aid Paste      LLE almost completely healed. Small area of irritation/slight denudement on medial ankle area.  Treatment Plan:     BLE (RLE - from knee to ankle; LLE medial ankle area)  Cleanse with Vashe (moisten gauze roll with Vashe and wrap each leg from ankle to knee - leave in place 15 - 30 minutes); let air dry after removing gauze.  Apply Critic Aid Paste to intact skin on periwound area.  Cover with Mextra, then secure with roll gauze.  OK to use Polymem instead of Mextra if it is available.    Orders: Written    RECOMMEND PRIMARY TEAM ORDER: Lymphedema consult  Education provided: plan of care  Discussed plan of care with: Patient  Notify WOC if wound(s) deteriorate.  Nursing to notify the Provider(s) and re-consult the WOC Nurse if new skin concern.    DATA:     Current support surface: Standard  ED cart  Containment of urine/stool: Incontinence Protocol  BMI: Body mass index is 47.56 kg/m .   Active diet order: Orders Placed This Encounter      Combination Diet Regular Diet Adult     Output: I/O last 3 completed shifts:  In: 110 [I.V.:10; IV Piggyback:100]  Out: -      Labs:   Recent Labs   Lab 07/08/25  0714 07/07/25  2308 07/01/25  1439   ALBUMIN  --   --  3.7   HGB 9.4*   < >  --    WBC 11.5*   < >  --      < > = values in this interval not displayed.     Pressure injury risk assessment:                          Laura Sotelo, MSN RN CWOCN  Pager no longer is use, please contact through [email or web address] group: [facility] [employer] [facility]

## 2025-07-08 NOTE — PROGRESS NOTES
Procedure/Surgery Information    [facility]     Peripherally Inserted Central Line Catheter (PICC)   Procedure Note   Date of Service (when I performed the procedure): 07/08/2025    Diagnosis or Indication: infection    Procedure: PICC Line Insertion   Pause for the cause: Consent for catheter placement procedure signed  Time out completed  Patient ID's verified using two distinct indicators  All necessary equipment is present   Type of line to be used: PICC   Full barrier precautions used: Yes   Skin preparation: Chloraprep   Date of insertion: July 7, 2025, 11:25 pM   Device type: Single lumen, valved, 4.0   Catheter brand: [employer]   Lot number: [id number]   Insertion location: Left basilic vein   Method of placement: Ultrasound   Number of attempts: With ultrasound: 2   Without ultrasound: 0   Difficulty threading: No   Midline IV device: Dressing dry and intact  Chlorhexidine patch   Arm circumference: Adults 15 cm   Midline extremity circumference: 37 cm   Internal length: 45 cm   Midline visible catheter length: 0 cm   Total catheter length: 45 cm   Tip termination: SVC   Method of verification: Chest x-ray   Midline patency post placement: Saline locked   Line flush: Line flush documented on the eMAR   Placement verified by: Physician   Catheter placed by: Abhay Pérez RN   Discontinuation form initiated: No   Patient tolerance: Tolerated well      Summary:  This procedure was performed with difficulty and she tolerated the procedure fairly well with no immediate complications.       Abhay Pérez RN

## 2025-07-08 NOTE — ED PROVIDER NOTES
EMERGENCY DEPARTMENT SIGN OUT NOTE        ED COURSE AND MEDICAL DECISION MAKING  Patient was signed out to me by Dr Vish Stafford at 11:00 PM  12:42 AM I spoke with Dr. Inman the hospitalist. Admitted patient.     In brief, Elizabeth Kelley is a 79 year old female who initially presented with bilateral leg cellulitis.      At time of sign out, disposition was pending: Labs, US      Followed up on lab and imaging evaluation after signout.   patient's DVT ultrasound was negative, suggesting a likely popliteal cyst but no DVT.  Lab evaluations reveal normal white blood cell count and mildly elevated CRP.  Hospitalization from March was reviewed.  Previous provider had ordered vancomycin and Zosyn.  Significant challenges with peripheral IV access through both nursing and vascular access RN.  They inquired about a PICC line placement due to multiple failed attempts at ultrasound-guided IV access peripherally.  I think this is reasonable as it has been necessary during prior hospitalizations and would  be the preferable central access given her stability.  Case reviewed with hospitalist.  Requested that we change patient to vancomycin and cefepime based on prior polymicrobial culture results.  Patient updated and in agreement with the care plan.    FINAL IMPRESSION    1. Cellulitis of right lower extremity        ED MEDS  Medications   vancomycin (VANCOCIN) 2,000 mg in sodium chloride 0.9 % 500 mL intermittent infusion (has no administration in time range)   lidocaine 1 % 0.1-5 mL (5 mLs Other $Given 7/8/25 0131)   lidocaine (LMX4) cream (has no administration in time range)   sodium chloride (PF) 0.9% PF flush 10-40 mL (has no administration in time range)   lidocaine 1 % 0.1-1 mL (has no administration in time range)   lidocaine (LMX4) cream (has no administration in time range)   sodium chloride (PF) 0.9% PF flush 3 mL (has no administration in time range)   sodium chloride (PF) 0.9% PF flush 3 mL (has no  administration in time range)   senna-docusate (SENOKOT-S/PERICOLACE) 8.6-50 MG per tablet 1 tablet (has no administration in time range)     Or   senna-docusate (SENOKOT-S/PERICOLACE) 8.6-50 MG per tablet 2 tablet (has no administration in time range)   calcium carbonate (TUMS) chewable tablet 1,000 mg (has no administration in time range)   enoxaparin ANTICOAGULANT (LOVENOX) injection 40 mg (has no administration in time range)   hydrALAZINE (APRESOLINE) tablet 10 mg (has no administration in time range)     Or   hydrALAZINE (APRESOLINE) injection 10 mg (has no administration in time range)   acetaminophen (TYLENOL) tablet 650 mg (has no administration in time range)     Or   acetaminophen (TYLENOL) Suppository 650 mg (has no administration in time range)   melatonin tablet 1 mg (has no administration in time range)   senna-docusate (SENOKOT-S/PERICOLACE) 8.6-50 MG per tablet 1 tablet (has no administration in time range)     Or   senna-docusate (SENOKOT-S/PERICOLACE) 8.6-50 MG per tablet 2 tablet (has no administration in time range)   ondansetron (ZOFRAN ODT) ODT tab 4 mg (has no administration in time range)     Or   ondansetron (ZOFRAN) injection 4 mg (has no administration in time range)   benzocaine-menthol (CHLORASEPTIC) 6-10 MG lozenge 1 lozenge (has no administration in time range)   miconazole (MICATIN) 2 % powder (has no administration in time range)   ceFEPIme (MAXIPIME) 2 g vial to attach to  mL bag for ADULTS or NS 50 mL bag for PEDS (2 g Intravenous $New Bag 7/8/25 0204)   sodium chloride (PF) 0.9% PF flush 10-20 mL (has no administration in time range)   sodium chloride (PF) 0.9% PF flush 10-40 mL (30 mLs Intracatheter $Given 7/8/25 0131)   sodium chloride (PF) 0.9% PF flush 10-40 mL (has no administration in time range)   HYDROmorphone (DILAUDID) half-tab 1-2 mg (2 mg Oral $Given 7/8/25 8481)   gabapentin (NEURONTIN) capsule 100 mg (has no administration in time range)       LAB  Labs  Ordered and Resulted from Time of ED Arrival to Time of ED Departure   BASIC METABOLIC PANEL - Abnormal       Result Value    Sodium 138      Potassium 5.0      Chloride 103      Carbon Dioxide (CO2) 25      Anion Gap 10      Urea Nitrogen 29.6 (*)     Creatinine 0.92      GFR Estimate 64      Calcium 9.2      Glucose 99     CRP INFLAMMATION - Abnormal    CRP Inflammation 57.20 (*)    CBC WITH PLATELETS AND DIFFERENTIAL - Abnormal    WBC Count 8.5      RBC Count 4.20      Hemoglobin 10.4 (*)     Hematocrit 34.7 (*)     MCV 83      MCH 24.8 (*)     MCHC 30.0 (*)     RDW 16.4 (*)     Platelet Count 375      % Neutrophils 68      % Lymphocytes 19      % Monocytes 10      % Eosinophils 3      % Basophils 1      % Immature Granulocytes 0      NRBCs per 100 WBC 0      Absolute Neutrophils 5.7      Absolute Lymphocytes 1.6      Absolute Monocytes 0.9      Absolute Eosinophils 0.2      Absolute Basophils 0.1      Absolute Immature Granulocytes 0.0      Absolute NRBCs 0.0     NT-PROBNP - Normal    NT-proBNP 125     BASIC METABOLIC PANEL   CBC WITH PLATELETS   BLOOD CULTURE   BLOOD CULTURE           RADIOLOGY    XR Chest Port 1 View   Final Result   IMPRESSION:    1.  Patient rotation to the right accounts for altered mediastinal contours.   2.  Left PICC tip in lower SVC. No pneumothorax.   3.  Mild interstitial pulmonary edema without pleural effusion.   4.  Normal heart size.      US Lower Extremity Venous Duplex Bilateral   Final Result   IMPRESSION:   1.  No deep venous thrombosis in the bilateral lower extremities.   2.  5.4 cm mildly complex collection in the left popliteal fossa, likely represents Baker's cyst.         XR Chest Port 1 View   Final Result   IMPRESSION: Cardiomediastinal silhouette within normal limits. No focal consolidation or pleural effusion. Mild elevation right hemidiaphragm. Degenerative change osseous structures.          DISCHARGE MEDS  New Prescriptions    No medications on file     Dave  MD [place]   [facility] EMERGENCY DEPARTMENT  [address] [place] [id number]  [phone]     Dave Meek MD  07/08/25 0246

## 2025-07-08 NOTE — PLAN OF CARE
Goal Outcome Evaluation:      Plan of Care Reviewed With: patient          Outcome Evaluation: TBD - goal is home with home care

## 2025-07-08 NOTE — PLAN OF CARE
Patient c/o itching head, chest and bilateral lower extremities.  Redness noted where she has been itching.  No hives noted.  No SOB.  Patient concerned that she is having an allergic reaction to one of the medications we have given her.  Page sent to Dr. Inman and an order for Benadryl has been put in.

## 2025-07-08 NOTE — PLAN OF CARE
Problem: Adult Inpatient Plan of Care  Goal: Plan of Care Review  Description: The Plan of Care Review/Shift note should be completed every shift.  The Outcome Evaluation is a brief statement about your assessment that the patient is improving, declining, or no change.  This information will be displayed automatically on your shift  note.  Flowsheets (Taken 7/8/2025 1624)  Plan of Care Reviewed With: patient  Overall Patient Progress: improving  Goal: Absence of Hospital-Acquired Illness or Injury  Outcome: Progressing  Intervention: Identify and Manage Fall Risk  Recent Flowsheet Documentation  Taken 7/8/2025 0920 by Morelia Bosch RN  Safety Promotion/Fall Prevention:   activity supervised   lighting adjusted   patient and family education   room near nurse's station   room organization consistent   safety round/check completed  Intervention: Prevent Skin Injury  Recent Flowsheet Documentation  Taken 7/8/2025 1320 by Morelia Bosch RN  Body Position:   supine   weight shifting  Taken 7/8/2025 0858 by Morelia Bosch RN  Body Position:   supine   weight shifting  Intervention: Prevent Infection  Recent Flowsheet Documentation  Taken 7/8/2025 0920 by Morelia Bosch RN  Infection Prevention:   rest/sleep promoted   single patient room provided  Goal: Optimal Comfort and Wellbeing  Outcome: Progressing  Intervention: Monitor Pain and Promote Comfort  Recent Flowsheet Documentation  Taken 7/8/2025 0858 by Morelia Bosch RN  Pain Management Interventions: medication (see MAR)  Goal: Readiness for Transition of Care  Outcome: Progressing     Problem: Delirium  Goal: Optimal Coping  Outcome: Progressing  Goal: Improved Behavioral Control  Intervention: Minimize Safety Risk  Recent Flowsheet Documentation  Taken 7/8/2025 0920 by Morelia Bosch RN  Enhanced Safety Measures:   assistive devices when indicated   pain management   patient/family teach back on injury risk   review medications for side effects with  activity   room near unit station  Goal: Improved Sleep  Outcome: Progressing   Goal Outcome Evaluation:      Plan of Care Reviewed With: patient    Overall Patient Progress: improvingOverall Patient Progress: improving       Pt a/o x4, pleasant, cooperative. Reports pain in right leg more than left--7-8/10. Received iv and po prn pain meds, see mar. Pt reported improvement. Pain up to a 9/10 when woc nurse working with wound on right leg.  Pt ate well for meals. In bed thi shift. External catheter in use.Reminded pt to use call light for needs. Staff to continue to monitor pt. Report given to Anika Kidd.

## 2025-07-08 NOTE — PROGRESS NOTES
Writer assisted w/ dressing change by holding the Pts R Leg.  Critic Aid paste was applied, then pt requested Polymem wrap and Mextra followed by kerlix wrap.

## 2025-07-08 NOTE — PROGRESS NOTES
[facility]    Medicine Progress Note - Hospitalist Service    Date of Admission:  7/7/2025    Assessment & Plan     79 year old female with a pertinent history of MS, anemia, edema, melanoma, lymphedema, chronic diastolic heart failure, sepsis, hypertensive HF, hypokalemia, osteoarthritis, CKD stage 3, GERD, hemorrhage, HTN, and malignant melanoma, who presents to this ED with bilateral cellulitis on legs        1.Bilateral cellulitis lower legs  -she notes she has been dealing with skin infections for years on her legs, much worse this week  -legs are more painful  -she was called by her clinic and told to come in due to swab cx results antibiotic resistance with Pseudomonas Enterococcus.   -was on daily minocycline 50 mg for prevention  - Vanco cefepime  IV  -ID to see  -woc to see    This year was on:  -Of note,was admitted 1/12 - 1/18/2025 for RIGHT lower extremity cellulitis and sepsis, treated with meropenem and vancomycin, and she was discharged on doxycycline.  She was again hospitalized 3/12 - 3/25/25 for LEFT lower extremity cellulitis. She was treated with Vanco and Zosyn/ceftriaxone and discharged with Keflex.     2.Chronic lymphedema  -sets her up for infections    3.MS  -walks with cane outside home  -in home no cane use    4.Acute pain  -needing dilaudid  -get pain team to see    5.Weakness  -complex from MS and infection  -will need pt/ot eval when more stable    6.Chronic anemia  -hx of Hiatal Hernia with Brian Lesions   EGD on 3/21/25 showed sizable hiatal hernia with Brian lesions with oozing.   -watch hgb  -on ppi    7.RLS  -on ropinirole    8.Anxiety depression - continue PTA Wellbutrin and Celexa    9.Hyperlipidemia - continue PTA statin                     Diet: Combination Diet Regular Diet Adult    DVT Prophylaxis: Enoxaparin (Lovenox) SQ  Amato Catheter: Not present  Lines: PRESENT             Cardiac Monitoring: None  Code Status: Full Code      Clinically  Significant Risk Factors Present on Admission                   # Hypertension: Noted on problem list      # Anemia: based on hgb <11       # Morbid Obesity: Estimated body mass index is 47.56 kg/m  as calculated from the following:    Height as of this encounter: 1.524 m (5').    Weight as of this encounter: 110.5 kg (243 lb 8 oz).       # Financial/Environmental Concerns:           Social Drivers of Health    Tobacco Use: Medium Risk (7/7/2025)    Patient History     Smoking Tobacco Use: Former     Smokeless Tobacco Use: Never          Disposition Plan     Medically Ready for Discharge: Anticipated in 2-4 Days             Ruma Velasquez MD  Hospitalist Service  [facility]  Securely message with [employer] (more info)  Text page via [employer] Paging/Directory   ______________________________________________________________________    Interval History   She notes she has been fighting infections in her legs years, but much worse this week  Clinic called her and told to come in due to cx results  Pain in both legs and thinks dilaudid helped  With her MS, walks with cane  Partner out of town on trip now    Physical Exam   Vital Signs: Temp: 98  F (36.7  C) Temp src: Tympanic BP: 129/59 Pulse: 83   Resp: 20 SpO2: 99 %      Weight: 243 lbs 8 oz    Constitutional: awake, fatigued, alert, cooperative, and mild distress  Eyes: sclera clear  Respiratory: no increased work of breathing, good air exchange, no retractions, and clear to auscultation  Cardiovascular: regular rate and rhythm and normal S1 and S2  GI: normal bowel sounds, soft, non-distended, and non-tender  Skin: erythema both lower ext, edema  Musculoskeletal: edema legs  Neurologic: Mental Status Exam:  Level of Alertness:   awake  Neuropsychiatric: General: calm and normal eye contact    Medical Decision Making       55 MINUTES SPENT BY ME on the date of service doing chart review, history, exam, documentation & further activities per the  note.      Data     I have personally reviewed the following data over the past 24 hrs:    11.5 (H)  \   9.4 (L)   / 311     138 103 29.6 (H) /  99   5.0 25 0.92 \     Trop: N/A BNP: 125     Procal: N/A CRP: 57.20 (H) Lactic Acid: N/A         Imaging results reviewed over the past 24 hrs:   Recent Results (from the past 24 hours)   XR Chest Port 1 View    Narrative    EXAM: XR CHEST PORT 1 VIEW  LOCATION: [facility]  DATE: 7/7/2025    INDICATION: Increased edema.  COMPARISON: 01/12/2025.      Impression    IMPRESSION: Cardiomediastinal silhouette within normal limits. No focal consolidation or pleural effusion. Mild elevation right hemidiaphragm. Degenerative change osseous structures.   US Lower Extremity Venous Duplex Bilateral    Narrative    EXAM: US LOWER EXTREMITY VENOUS DUPLEX BILATERAL  LOCATION: [facility]  DATE: 7/7/2025    INDICATION: Bilateral leg swelling and cellulitis.  COMPARISON: Lower extremity venous on 3/12/2025.  TECHNIQUE: Venous Duplex ultrasound of bilateral lower extremities with and without compression, augmentation and duplex. Color flow and spectral Doppler with waveform analysis performed.    FINDINGS: Technically challenging exam due to patient's body habitus and presence of soft tissue edema. Within this limitation, there is:    Exam includes the common femoral, femoral, popliteal veins as well as segmentally visualized deep calf veins and greater saphenous vein.     RIGHT: No deep vein thrombosis, although the calf veins are not visualized due to presence of overlying wound dressing. No superficial thrombophlebitis. No popliteal cyst.    LEFT: No deep vein thrombosis. No superficial thrombophlebitis. There is 1.8 x 5.4 x 1.5 cm mildly complex collection in the popliteal fossa, likely represents Baker's cyst.      Impression    IMPRESSION:  1.  No deep venous thrombosis in the bilateral lower extremities.  2.  5.4 cm mildly complex  collection in the left popliteal fossa, likely represents Baker's cyst.     XR Chest Port 1 View    Narrative    EXAM: XR CHEST PORT 1 VIEW  LOCATION: [facility]  DATE: 7/8/2025    INDICATION: PICC placement  COMPARISON: None.      Impression    IMPRESSION:   1.  Patient rotation to the right accounts for altered mediastinal contours.  2.  Left PICC tip in lower SVC. No pneumothorax.  3.  Mild interstitial pulmonary edema without pleural effusion.  4.  Normal heart size.

## 2025-07-08 NOTE — MEDICATION SCRIBE - ADMISSION MEDICATION HISTORY
Medication Scribe Admission Medication History    Admission medication history is complete. The information provided in this note is only as accurate as the sources available at the time of the update.    Information Source(s): Patient via in-person    Pertinent Information: PTA med list updated per patient    Changes made to PTA medication list:  Added: Oxycodone, Vitamin E (per patient)  Deleted: Hydromorphone, Lactobacillus, Carboxymethylcellulose (per patient)  Changed: None    Allergies reviewed with patient and updates made in EHR: yes    Medication History Completed By: Arden Johnson 7/8/2025 3:08 AM    PTA Med List   Medication Sig Last Dose/Taking    acetaminophen (TYLENOL) 500 MG tablet Take 1,000 mg by mouth every 6 hours as needed for pain. Taking As Needed    ascorbic acid (VITAMIN C) 250 MG CHEW chewable tablet Take 250 mg by mouth daily. 7/7/2025 Morning    buPROPion (WELLBUTRIN XL) 150 MG 24 hr tablet Take 150 mg by mouth every morning. 7/7/2025 Morning    citalopram (CELEXA) 40 MG tablet Take 40 mg by mouth daily (with lunch) 7/7/2025 Morning    diclofenac (VOLTAREN) 1 % topical gel Apply 2 g topically 4 times daily as needed for moderate pain. Taking As Needed    ferrous sulfate (FEROSUL) 325 (65 Fe) MG tablet Take 325 mg by mouth three times a week as needed. Monday, Wednesday and Friday 7/7/2025 Morning    furosemide (LASIX) 20 MG tablet Take 40 mg by mouth daily. 7/7/2025 Morning    gabapentin (NEURONTIN) 100 MG capsule Take 100 mg by mouth 3 times daily 7/7/2025 Noon    magnesium oxide (MAG-OX) 400 MG tablet Take 1 tablet by mouth three times a week. Tuesday, Thursday and Saturday 7/5/2025    minocycline (DYNACIN) 50 MG tablet Take 1 tablet (50 mg) by mouth daily. 7/6/2025 Bedtime    multivitamin w/minerals (CENTRUM ADULTS) tablet Take 1 tablet by mouth every morning 7/7/2025 Morning    nitroGLYcerin (NITROSTAT) 0.4 MG sublingual tablet Place 0.4 mg under the tongue every 5 minutes as  needed for chest pain. Taking As Needed    nystatin (MYCOSTATIN) 020947 UNIT/GM external powder Apply topically 2 times daily as needed. Taking As Needed    oxyCODONE (ROXICODONE) 5 MG tablet Take 5 mg by mouth every 6 hours as needed for pain. Taking As Needed    pantoprazole (PROTONIX) 40 MG EC tablet Take 40 mg by mouth daily. 7/7/2025 Morning    potassium chloride ER (K-TAB) 20 MEQ CR tablet Take 20 mEq by mouth 3 times daily. 7/7/2025 Morning    rOPINIRole (REQUIP) 1 MG tablet Take 1 mg by mouth at bedtime. 7/6/2025 Bedtime    rOPINIRole (REQUIP) 1 MG tablet Take 0.5 mg by mouth 2 times daily. Take one half tablet (0.5 mg) twice daily 7/7/2025 Morning    simvastatin (ZOCOR) 40 MG tablet [SIMVASTATIN (ZOCOR) 40 MG TABLET] Take 40 mg by mouth bedtime. 7/6/2025 Bedtime    spironolactone (ALDACTONE) 25 MG tablet Take 25 mg by mouth at bedtime. 7/6/2025 Bedtime    vitamin B-Complex Take 1 tablet by mouth three times a week. Tuesday, Thursday and Friday 7/5/2025    vitamin E (TOCOPHEROL) 400 units (180 mg) capsule Take 400 Units by mouth daily. 7/7/2025 Morning    zinc 50 MG TABS Take 1 tablet by mouth three times a week. Monday, Wednesday and Friday 7/7/2025 Morning

## 2025-07-08 NOTE — CONSULTS
Consultation - Infectious Disease  [facility]  Elizabeth Kelley,  1945, MRN [id number]    Admitting Dx: Cellulitis of right lower extremity [L03.115]  Bilateral cellulitis of lower leg [L03.116, L03.115]    PCP: Yung Herrmann, [phone]       ASSESSMENT   79-year-old woman with chronic lymphedema, hyperlipidemia, diastolic dysfunction, hypertension, multiple sclerosis admitted with worsening right leg infection.    Chronic right leg wound secondary to lymphedema.  Worsening appearance noted by home nurse.  Swab culture growing 11 different organisms.  No signs of systemic infection.  Mild leukocytosis today, normal on admission.    Principal Problem:    Bilateral cellulitis of lower leg  Active Problems:    Acquired lymphedema of leg    Depression    GERD (gastroesophageal reflux disease)    Essential hypertension    Menopausal and postmenopausal disorder    Morbid obesity -- BMI 48.8    Weakness generalized    Cellulitis of right lower extremity       PLAN   -Difficult to interpret surface wound culture as this represents wound colonization and might not accurately reflect etiology of infection.  Identification of 11 organisms is more of a sign of the excellent work that our microbiology lab does.  - Empiric broad coverage with vancomycin and cefepime  -Anticipate 3 to 5 days of IV antibiotics with daily wound cares  - Wound care consult      Thank you for this consult. Will follow.    Son Rodriguez MD  [facility]  Direct messaging: [employer] Paging  On-Call ID provider: [phone], option: 9      ===========================================      Chief Complaint   Bilateral cellulitis of lower leg       HPI     We have been requested by Ruma Velasquez MD to evaluate Elizabeth Kelley for the above.    History obtained by patient    Elizabeth Kelley is a 79 year old woman with a history of chronic lymphedema, HFpEF, hyperlipidemia, multiple sclerosis,  hypertension directed to the ER for from her primary clinic due to positive wound cultures.  She has chronic lymphedema and has home nursing for wound cares every Monday, Wednesday, and Friday.  She will occasionally do weekend dressing changes herself.  No other changes in her health.  No fevers no new leg pain.  She is dealing with bilateral knee pain and is due for injections next month.  Last week her home nurse was concerned with the appearance of her right leg and swabbed the wound for culture.  As the culture results returned, it was recommended that she come to the ER for treatment.          Review of Systems   Ten systems reviewed and negative except for what is noted in the HPI       Medical History  Past Medical History:   Diagnosis Date    Ankle contracture, left     Class 3 severe obesity due to excess calories without serious comorbidity with body mass index (BMI) of 45.0 to 49.9 in adult (H)     Combined gastric and duodenal ulcer     Controlled substance agreement broken     Depression     Dyslipidemia     Edema     Edema     Gait abnormality     GERD (gastroesophageal reflux disease)     Gout     Lymphedema of both lower extremities     Melanoma (H)     left upper arm    MS (multiple sclerosis) (H)     RLS (restless legs syndrome)     Skin ulcer of left lower leg (H)     Valgus deformity of both feet     Vitamin D deficiency     Surgical History  She  has a past surgical history that includes Mammoplasty reduction (Bilateral); Cosmetic surgery (1988); Esophagoscopy, gastroscopy, duodenoscopy (EGD), combined (N/A, 03/30/2015); Spine surgery; Ablation Saphenous Vein W/ Rfa (Right, 04/12/2021); Mohs micrographic procedure; PICC/Midline Placement (8/13/2021); PICC/Midline Placement (9/2/2021); Irrigation and debridement lower extremity, combined (Right, 3/21/2022); Irrigation and debridement lower extremity, combined (Right, 3/28/2022); PICC/Midline Placement (7/21/2024); PICC/Midline Placement  (9/8/2024); PICC/Midline Placement (1/14/2025); PICC/Midline Placement (3/20/2025); and Esophagoscopy, gastroscopy, duodenoscopy (EGD), combined (N/A, 3/21/2025).     Social History  Reviewed, and she  reports that she quit smoking about 49 years ago. Her smoking use included cigarettes. She started smoking about 61 years ago. She has a 3 pack-year smoking history. She has never used smokeless tobacco. She reports current alcohol use of about 1.0 standard drink of alcohol per week. She reports that she does not use drugs.  Social History     Social History Narrative    .   Has significant other.  2 grown children.  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  (last updated 3/26/2025)      Family History  family history includes Arthritis in her maternal grandmother, maternal uncle, paternal grandfather, paternal grandmother, and sister; Asthma in her sister; Brain Cancer in her father; Breast Cancer in her paternal aunt; Colon Cancer in her paternal aunt; Early Death in her maternal grandfather; Hyperlipidemia in her mother, paternal aunt, and sister; Hypertension in her mother, paternal aunt, and sister; Multiple Sclerosis in her mother and sister; Obesity in her sister; Uterine Cancer in her mother.  family history reviewed and is not pertinent to the presenting problem.            Allergies     Allergies   Allergen Reactions    Other Environmental Allergy Anaphylaxis     Bees/Wasps Stings  Bees/Wasps Stings    Adhesive Tape Other (See Comments)     Red,itchy and oozes  Red,itchy and oozes    Adhesive [Cyanoacrylate] Unknown     Other reaction(s): sores    Latex Hives    Ragweeds Itching    Oxycodone-Acetaminophen Rash    Vicodin [Hydrocodone-Acetaminophen] Nausea and Vomiting and Hives           Antibiotics   Vancomycin 7/8-  Cefepime 7/8-    Previous:  None       Physical Exam     Temp:  [98  F (36.7  C)-98.8  F (37.1  C)] 98.2  F (36.8   C)  Pulse:  [70-83] 82  Resp:  [18-20] 18  BP: (129-176)/(59-78) 143/65  SpO2:  [94 %-99 %] 98 %    BP (!) 143/65   Pulse 82   Temp 98.2  F (36.8  C) (Oral)   Resp 18   Ht 1.524 m (5')   Wt 110.5 kg (243 lb 8 oz)   SpO2 98%   BMI 47.56 kg/m      GENERAL:  well-developed, well-nourished, lying in bed in no acute distress.   HENT:  Head is normocephalic, atraumatic.   EYES:  Eyes have anicteric sclerae without conjunctival injection or stigmata of endocarditis.   NECK:  Supple.  LUNGS:  Clear to auscultation.  CARDIOVASCULAR:  Regular rate and rhythm with no murmurs, gallops or rubs.  ABDOMEN:  Normal bowel sounds, soft, nontender. No appreciable hepatosplenomegaly  EXT: Chronic lymphedema changes in the left lower leg.  Large circumferential open wound on the right lower leg with significant drainage, see pictures below  SKIN:  No acute rashes.  Left arm PICC line is in place without any surrounding erythema. No stigmata of endocarditis.  NEUROLOGIC:  Grossly nonfocal.    Right lower leg 7/8:              Cultures   7/8 blood cultures x 2: Pending    Susceptibility data from last 90 days.  Collected Specimen Info Organism Ampicillin Ampicillin/Sulbactam Cefazolin Cefepime Ceftazidime Ceftriaxone Ciprofloxacin Clindamycin Daptomycin Doxycycline Erythromycin Gentamicin Gentamicin Synergy Levofloxacin   07/02/25 Wound from Leg, Right Proteus vulgaris  R  S  R  S  S  S  S      S   S     Citrobacter freundii complex R R   S R R  I      S   I     Pseudomonas aeruginosa     S  S   R        R     Klebsiella pneumoniae R  S   S  S  S  S      S   S     Enterobacter cloacae complex R R   S R R  S      S   S     Staphylococcus aureus        R  S  I  R  S       Enterococcus avium  S             S      Enterococcus faecalis  S             R      Stenotrophomonas maltophilia               S     Corynebacterium striatum                   Streptococcus constellatus                   Collected Specimen Info Organism Meropenem  Minocycline Oxacillin Piperacillin/Tazobactam Tetracycline Trimethoprim/Sulfamethoxazole  Vancomycin   07/02/25 Wound from Leg, Right Proteus vulgaris  S    S   S      Citrobacter freundii complex  S   R   S      Pseudomonas aeruginosa  S    S        Klebsiella pneumoniae  S    S   S      Enterobacter cloacae complex  S   R   S      Staphylococcus aureus    S   R  R  S     Enterococcus avium        S     Enterococcus faecalis        S     Stenotrophomonas maltophilia   S     S      Corynebacterium striatum            Streptococcus constellatus               Laboratory results     Recent Labs   Lab 07/08/25 0714 07/07/25 2308   WBC 11.5* 8.5   HGB 9.4* 10.4*    375       Recent Labs   Lab 07/08/25  0714 07/07/25 2308 07/01/25  1439    138 142   CO2 27 25 26   BUN 22.8 29.6* 29.3*   ALBUMIN  --   --  3.7   ALKPHOS  --   --  131   ALT  --   --  19   AST  --   --  16       Recent Labs   Lab 07/07/25 2308   CRPI 57.20*             Imaging   Radiology results reviewed    XR Chest Port 1 View  Result Date: 7/8/2025  EXAM: XR CHEST PORT 1 VIEW LOCATION: [facility] DATE: 7/8/2025 INDICATION: PICC placement COMPARISON: None.     IMPRESSION: 1.  Patient rotation to the right accounts for altered mediastinal contours. 2.  Left PICC tip in lower SVC. No pneumothorax. 3.  Mild interstitial pulmonary edema without pleural effusion. 4.  Normal heart size.    US Lower Extremity Venous Duplex Bilateral  Result Date: 7/7/2025  EXAM: US LOWER EXTREMITY VENOUS DUPLEX BILATERAL LOCATION: [facility] DATE: 7/7/2025 INDICATION: Bilateral leg swelling and cellulitis. COMPARISON: Lower extremity venous on 3/12/2025. TECHNIQUE: Venous Duplex ultrasound of bilateral lower extremities with and without compression, augmentation and duplex. Color flow and spectral Doppler with waveform analysis performed. FINDINGS: Technically challenging exam due to patient's body habitus and  presence of soft tissue edema. Within this limitation, there is: Exam includes the common femoral, femoral, popliteal veins as well as segmentally visualized deep calf veins and greater saphenous vein. RIGHT: No deep vein thrombosis, although the calf veins are not visualized due to presence of overlying wound dressing. No superficial thrombophlebitis. No popliteal cyst. LEFT: No deep vein thrombosis. No superficial thrombophlebitis. There is 1.8 x 5.4 x 1.5 cm mildly complex collection in the popliteal fossa, likely represents Baker's cyst.     IMPRESSION: 1.  No deep venous thrombosis in the bilateral lower extremities. 2.  5.4 cm mildly complex collection in the left popliteal fossa, likely represents Baker's cyst.     XR Chest Port 1 View  Result Date: 7/7/2025  EXAM: XR CHEST PORT 1 VIEW LOCATION: [facility] DATE: 7/7/2025 INDICATION: Increased edema. COMPARISON: 01/12/2025.     IMPRESSION: Cardiomediastinal silhouette within normal limits. No focal consolidation or pleural effusion. Mild elevation right hemidiaphragm. Degenerative change osseous structures.      Data reviewed today: I reviewed all medications, new labs and imaging results over the last 24 hours. I personally reviewed no images or EKG's today.  The patient's care was discussed with the Bedside Nurse and Patient.

## 2025-07-09 ENCOUNTER — APPOINTMENT (OUTPATIENT)
Dept: PHYSICAL THERAPY | Facility: HOSPITAL | Age: 80
End: 2025-07-09
Attending: INTERNAL MEDICINE
Payer: COMMERCIAL

## 2025-07-09 LAB
ANION GAP SERPL CALCULATED.3IONS-SCNC: 8 MMOL/L (ref 7–15)
BUN SERPL-MCNC: 24.7 MG/DL (ref 8–23)
CALCIUM SERPL-MCNC: 8.5 MG/DL (ref 8.8–10.4)
CHLORIDE SERPL-SCNC: 105 MMOL/L (ref 98–107)
CREAT SERPL-MCNC: 1.05 MG/DL (ref 0.51–0.95)
EGFRCR SERPLBLD CKD-EPI 2021: 54 ML/MIN/1.73M2
ERYTHROCYTE [DISTWIDTH] IN BLOOD BY AUTOMATED COUNT: 16.3 % (ref 10–15)
GLUCOSE SERPL-MCNC: 110 MG/DL (ref 70–99)
HCO3 SERPL-SCNC: 26 MMOL/L (ref 22–29)
HCT VFR BLD AUTO: 28.9 % (ref 35–47)
HGB BLD-MCNC: 8.7 G/DL (ref 11.7–15.7)
MCH RBC QN AUTO: 24.9 PG (ref 26.5–33)
MCHC RBC AUTO-ENTMCNC: 30.1 G/DL (ref 31.5–36.5)
MCV RBC AUTO: 83 FL (ref 78–100)
PLATELET # BLD AUTO: 261 10E3/UL (ref 150–450)
POTASSIUM SERPL-SCNC: 4.1 MMOL/L (ref 3.4–5.3)
RBC # BLD AUTO: 3.5 10E6/UL (ref 3.8–5.2)
SODIUM SERPL-SCNC: 139 MMOL/L (ref 135–145)
WBC # BLD AUTO: 8 10E3/UL (ref 4–11)

## 2025-07-09 PROCEDURE — 120N000001 HC R&B MED SURG/OB

## 2025-07-09 PROCEDURE — 85014 HEMATOCRIT: CPT | Performed by: HOSPITALIST

## 2025-07-09 PROCEDURE — 250N000013 HC RX MED GY IP 250 OP 250 PS 637: Performed by: NURSE PRACTITIONER

## 2025-07-09 PROCEDURE — 99232 SBSQ HOSP IP/OBS MODERATE 35: CPT | Performed by: INTERNAL MEDICINE

## 2025-07-09 PROCEDURE — 250N000013 HC RX MED GY IP 250 OP 250 PS 637: Performed by: HOSPITALIST

## 2025-07-09 PROCEDURE — 250N000011 HC RX IP 250 OP 636: Performed by: NURSE PRACTITIONER

## 2025-07-09 PROCEDURE — G0545 PR INHRENT VISIT TO INPT/OBS W CNFRM/SUSPCT INFCT DIS BY INFCT DIS SPCIALST: HCPCS | Performed by: INTERNAL MEDICINE

## 2025-07-09 PROCEDURE — 258N000003 HC RX IP 258 OP 636: Performed by: HOSPITALIST

## 2025-07-09 PROCEDURE — 80048 BASIC METABOLIC PNL TOTAL CA: CPT | Performed by: HOSPITALIST

## 2025-07-09 PROCEDURE — 97161 PT EVAL LOW COMPLEX 20 MIN: CPT | Mod: GP

## 2025-07-09 PROCEDURE — 99233 SBSQ HOSP IP/OBS HIGH 50: CPT | Performed by: INTERNAL MEDICINE

## 2025-07-09 PROCEDURE — 250N000011 HC RX IP 250 OP 636: Performed by: HOSPITALIST

## 2025-07-09 PROCEDURE — 99232 SBSQ HOSP IP/OBS MODERATE 35: CPT | Performed by: NURSE PRACTITIONER

## 2025-07-09 PROCEDURE — 97116 GAIT TRAINING THERAPY: CPT | Mod: GP

## 2025-07-09 RX ADMIN — ROPINIROLE HYDROCHLORIDE 0.5 MG: 0.25 TABLET, FILM COATED ORAL at 09:20

## 2025-07-09 RX ADMIN — CEFEPIME HYDROCHLORIDE 2 G: 2 INJECTION, POWDER, FOR SOLUTION INTRAVENOUS at 00:21

## 2025-07-09 RX ADMIN — ENOXAPARIN SODIUM 40 MG: 40 INJECTION SUBCUTANEOUS at 22:11

## 2025-07-09 RX ADMIN — ROPINIROLE HYDROCHLORIDE 1 MG: 1 TABLET, FILM COATED ORAL at 22:20

## 2025-07-09 RX ADMIN — BUPROPION HYDROCHLORIDE 150 MG: 150 TABLET, FILM COATED, EXTENDED RELEASE ORAL at 09:19

## 2025-07-09 RX ADMIN — CITALOPRAM 40 MG: 40 TABLET ORAL at 09:20

## 2025-07-09 RX ADMIN — GABAPENTIN 300 MG: 300 CAPSULE ORAL at 09:19

## 2025-07-09 RX ADMIN — GABAPENTIN 300 MG: 300 CAPSULE ORAL at 22:12

## 2025-07-09 RX ADMIN — ACETAMINOPHEN 975 MG: 325 TABLET ORAL at 00:21

## 2025-07-09 RX ADMIN — SENNOSIDES AND DOCUSATE SODIUM 1 TABLET: 50; 8.6 TABLET ORAL at 09:30

## 2025-07-09 RX ADMIN — SODIUM CHLORIDE 1250 MG: 0.9 INJECTION, SOLUTION INTRAVENOUS at 05:38

## 2025-07-09 RX ADMIN — HYDROMORPHONE HYDROCHLORIDE 0.2 MG: 0.2 INJECTION, SOLUTION INTRAMUSCULAR; INTRAVENOUS; SUBCUTANEOUS at 14:15

## 2025-07-09 RX ADMIN — HYDROMORPHONE HYDROCHLORIDE 2 MG: 2 TABLET ORAL at 18:41

## 2025-07-09 RX ADMIN — ROPINIROLE HYDROCHLORIDE 0.5 MG: 0.25 TABLET, FILM COATED ORAL at 17:14

## 2025-07-09 RX ADMIN — SIMVASTATIN 40 MG: 10 TABLET, FILM COATED ORAL at 22:12

## 2025-07-09 RX ADMIN — SPIRONOLACTONE 25 MG: 25 TABLET, FILM COATED ORAL at 22:13

## 2025-07-09 RX ADMIN — ACETAMINOPHEN 975 MG: 325 TABLET ORAL at 05:41

## 2025-07-09 RX ADMIN — FERROUS SULFATE TAB 325 MG (65 MG ELEMENTAL FE) 325 MG: 325 (65 FE) TAB at 09:19

## 2025-07-09 RX ADMIN — SENNOSIDES AND DOCUSATE SODIUM 1 TABLET: 50; 8.6 TABLET ORAL at 22:12

## 2025-07-09 RX ADMIN — Medication 400 UNITS: at 09:20

## 2025-07-09 RX ADMIN — ENOXAPARIN SODIUM 40 MG: 40 INJECTION SUBCUTANEOUS at 09:19

## 2025-07-09 RX ADMIN — Medication 250 MG: at 09:19

## 2025-07-09 RX ADMIN — ACETAMINOPHEN 975 MG: 325 TABLET ORAL at 13:23

## 2025-07-09 RX ADMIN — CEFEPIME HYDROCHLORIDE 2 G: 2 INJECTION, POWDER, FOR SOLUTION INTRAVENOUS at 13:22

## 2025-07-09 RX ADMIN — FUROSEMIDE 40 MG: 20 TABLET ORAL at 09:19

## 2025-07-09 RX ADMIN — PANTOPRAZOLE SODIUM 40 MG: 20 TABLET, DELAYED RELEASE ORAL at 09:19

## 2025-07-09 RX ADMIN — Medication 1 TABLET: at 09:19

## 2025-07-09 ASSESSMENT — ACTIVITIES OF DAILY LIVING (ADL)
ADLS_ACUITY_SCORE: 36
ADLS_ACUITY_SCORE: 37
ADLS_ACUITY_SCORE: 36
ADLS_ACUITY_SCORE: 37

## 2025-07-09 NOTE — PROGRESS NOTES
Infectious Diseases Progress Note  [facility]    Date of visit: 07/09/2025     ASSESSMENT   79-year-old woman with chronic lymphedema, hyperlipidemia, diastolic dysfunction, hypertension, multiple sclerosis admitted with worsening right leg infection.    Chronic right leg wound secondary to lymphedema.  Worsening appearance noted by home nurse.  Swab culture growing 11 different organisms.  No signs of systemic infection.  Mild leukocytosis, now normal.    Principal Problem:    Bilateral cellulitis of lower leg  Active Problems:    Acquired lymphedema of leg    Depression    GERD (gastroesophageal reflux disease)    Essential hypertension    Menopausal and postmenopausal disorder    Morbid obesity -- BMI 48.8    Weakness generalized    Cellulitis of right lower extremity       PLAN   -Difficult to interpret surface wound culture as this represents wound colonization and might not accurately reflect etiology of infection.  Identification of 11 organisms is more of a sign of the excellent work that our microbiology lab does.  - Empiric broad coverage with vancomycin and cefepime  -Anticipate 3 to 5 days of IV antibiotics with daily wound cares      Son Rodriguez MD  [facility]  Direct messaging: [employer] Paging  On-Call ID provider: [phone], option: 9      ===========================================      SUBJECTIVE / INTERVAL HISTORY:     No events. Having difficulty getting supplies needed for wound care today. Tolerating antibiotics       Antibiotics   Vancomycin 7/8-  Cefepime 7/8-    Previous:  None       Physical Exam     Temp:  [98.2  F (36.8  C)-98.8  F (37.1  C)] 98.2  F (36.8  C)  Pulse:  [71-78] 72  Resp:  [16-20] 16  BP: (117-150)/(55-65) 117/55  SpO2:  [87 %-98 %] 95 %    /55 (BP Location: Right arm)   Pulse 72   Temp 98.2  F (36.8  C) (Oral)   Resp 16   Ht 1.524 m (5')   Wt 108.6 kg (239 lb 6.7 oz)   SpO2 95%   BMI 46.76 kg/m      GENERAL:   well-developed, well-nourished, lying in bed in no acute distress.   HENT:  Head is normocephalic, atraumatic.   EYES:  Eyes have anicteric sclerae without conjunctival injection   LUNGS:  normal respiratory pattern   EXT: Chronic lymphedema changes in the left lower leg.  Large circumferential open wound on the right lower leg with significant drainage, see pictures below - unchanged today  SKIN:  No acute rashes.  Left arm PICC line is in place without any surrounding erythema.   NEUROLOGIC:  Grossly nonfocal.    Right lower leg 7/8:              Cultures   7/8 blood cultures x 2: no growth to date     Susceptibility data from last 90 days.  Collected Specimen Info Organism Ampicillin Ampicillin/Sulbactam Cefazolin Cefepime Ceftazidime Ceftriaxone Ciprofloxacin Clindamycin Daptomycin Doxycycline Erythromycin Gentamicin Gentamicin Synergy Levofloxacin   07/02/25 Wound from Leg, Right Proteus vulgaris  R  S  R  S  S  S  S      S   S     Citrobacter freundii complex R R   S R R  I      S   I     Pseudomonas aeruginosa     S  S   R        R     Klebsiella pneumoniae R  S   S  S  S  S      S   S     Enterobacter cloacae complex R R   S R R  S      S   S     Staphylococcus aureus        R  S  I  R  S       Enterococcus avium  S             S      Enterococcus faecalis  S             R      Stenotrophomonas maltophilia               S     Corynebacterium striatum                   Streptococcus constellatus                   Collected Specimen Info Organism Meropenem Minocycline Oxacillin Piperacillin/Tazobactam Tetracycline Trimethoprim/Sulfamethoxazole  Vancomycin   07/02/25 Wound from Leg, Right Proteus vulgaris  S    S   S      Citrobacter freundii complex  S   R   S      Pseudomonas aeruginosa  S    S        Klebsiella pneumoniae  S    S   S      Enterobacter cloacae complex  S   R   S      Staphylococcus aureus    S   R  R  S     Enterococcus avium        S     Enterococcus faecalis        S     Stenotrophomonas  maltophilia   S     S      Corynebacterium striatum            Streptococcus constellatus                 Pertinent Labs:     Recent Labs   Lab 07/09/25  0546 07/08/25  0714 07/07/25  2308   WBC 8.0 11.5* 8.5   HGB 8.7* 9.4* 10.4*    311 375       Recent Labs   Lab 07/09/25  0546 07/08/25  0714 07/07/25  2308    141 138   CO2 26 27 25   BUN 24.7* 22.8 29.6*       Recent Labs   Lab 07/07/25  2308   CRPI 57.20*           Imaging:     XR Chest Port 1 View  Result Date: 7/8/2025  EXAM: XR CHEST PORT 1 VIEW LOCATION: [facility] DATE: 7/8/2025 INDICATION: PICC placement COMPARISON: None.     IMPRESSION: 1.  Patient rotation to the right accounts for altered mediastinal contours. 2.  Left PICC tip in lower SVC. No pneumothorax. 3.  Mild interstitial pulmonary edema without pleural effusion. 4.  Normal heart size.    US Lower Extremity Venous Duplex Bilateral  Result Date: 7/7/2025  EXAM: US LOWER EXTREMITY VENOUS DUPLEX BILATERAL LOCATION: [facility] DATE: 7/7/2025 INDICATION: Bilateral leg swelling and cellulitis. COMPARISON: Lower extremity venous on 3/12/2025. TECHNIQUE: Venous Duplex ultrasound of bilateral lower extremities with and without compression, augmentation and duplex. Color flow and spectral Doppler with waveform analysis performed. FINDINGS: Technically challenging exam due to patient's body habitus and presence of soft tissue edema. Within this limitation, there is: Exam includes the common femoral, femoral, popliteal veins as well as segmentally visualized deep calf veins and greater saphenous vein. RIGHT: No deep vein thrombosis, although the calf veins are not visualized due to presence of overlying wound dressing. No superficial thrombophlebitis. No popliteal cyst. LEFT: No deep vein thrombosis. No superficial thrombophlebitis. There is 1.8 x 5.4 x 1.5 cm mildly complex collection in the popliteal fossa, likely represents Baker's cyst.      IMPRESSION: 1.  No deep venous thrombosis in the bilateral lower extremities. 2.  5.4 cm mildly complex collection in the left popliteal fossa, likely represents Baker's cyst.     XR Chest Port 1 View  Result Date: 7/7/2025  EXAM: XR CHEST PORT 1 VIEW LOCATION: [facility] DATE: 7/7/2025 INDICATION: Increased edema. COMPARISON: 01/12/2025.     IMPRESSION: Cardiomediastinal silhouette within normal limits. No focal consolidation or pleural effusion. Mild elevation right hemidiaphragm. Degenerative change osseous structures.          Data reviewed today: I reviewed all medications, new labs and imaging results over the last 24 hours. I personally reviewed no images or EKG's today.  The patient's care was discussed with the Bedside Nurse and Patient.

## 2025-07-09 NOTE — PROGRESS NOTES
"Care Management Follow Up    Length of Stay (days): 1    Expected Discharge Date: 07/10/2025    Anticipated Discharge Plan:  TBD, pt prefers to return home with home care     Transportation: Anticipate Family/friend    PT Recommendations:  Hosp plans to place consult  OT Recommendations: Hosp plans to place consult      Barriers to Discharge: medical stability/ ID following pt per note, \"3-5 days of IV abx and daily wound cares. \"   WOC following for lymphedema w/ cellulitis.   Pain team following.       Prior Living Situation:   \"Pt lives in a house with S/O, Jaleel. Pt uses cane for ambulation. Pt is independent at baseline. Pt is open with [facility] for RN. Unknown discharge needs, pt goal is to return home with home care.     CM called [facility] and confirmed pt is open with them for home RN.\"      Discussed  Partnership in Safe Discharge Planning  document with patient/family: No     Handoff Completed: No, handoff not indicated or clinically appropriate    Patient/Spokesperson Updated: No    Additional Information:  Pt being followed by specialities.   No CM updates currently.   Pt moving A-1 in room, and therapy consults now being placed.       Next Steps: Follow ID rec. Follow therapy recs. CM will continue to monitor progression of care, review team recommendations and provide discharge planning assist as needed.       Natalya Nash, RN  Acute Care Management  [facility]  For further questions/concerns you can reach us at [employer] Web Console    "

## 2025-07-09 NOTE — PROGRESS NOTES
[facility]    PROGRESS NOTE - Hospitalist Service    ASSESSMENT AND PLAN     Principal Problem:    Bilateral cellulitis of lower leg  Active Problems:    Acquired lymphedema of leg    Depression    GERD (gastroesophageal reflux disease)    Essential hypertension    Menopausal and postmenopausal disorder    Morbid obesity -- BMI 48.8    Weakness generalized    Cellulitis of right lower extremity    Elizabeth Kelley is a 79 year old female with a pertinent history of MS, anemia, edema, melanoma, lymphedema, chronic diastolic heart failure, sepsis, hypertensive HF, hypokalemia, osteoarthritis, CKD stage 3, GERD, hemorrhage, HTN, and malignant melanoma, who presents to this ED with bilateral cellulitis of lower extremities.       Bilateral cellulitis lower legs  - Patient notes acute on chronic infections for years.    - Swab culture results antibiotic resistance with Pseudomonas Enterococcus.   -PTA daily minocycline 50 mg for prevention  - ID commending vancomycin and cefepime.  Follow-up further recs.      Brief history    Admitted 1/12 - 1/18/2025 for RIGHT lower extremity cellulitis and sepsis, treated with meropenem and vancomycin, and she was discharged on doxycycline.    Hospitalized 3/12 - 3/25/25 for LEFT lower extremity cellulitis. She was treated with Vanco and Zosyn/ceftriaxone and discharged with Keflex.      Chronic lymphedema     MS  -walks with cane outside home  -in home no cane use  PT/OT to see      Acute pain  -Pain team to see     Weakness  -Complex from MS and infection     Chronic anemia  -hx of Hiatal Hernia with Brian Lesions   EGD on 3/21/25 showed sizable hiatal hernia with Brian lesions with oozing.   -On ppi     RLS  -on ropinirole     Anxiety depression - continue PTA Wellbutrin and Celexa     Hyperlipidemia - continue PTA statin       Barriers to discharge: IV antibiotics, ID recommendations    Anticipated length of stay: 3 days    Medically Ready for  Discharge: Anticipated in 2-4 Days    Clinically Significant Risk Factors                   # Hypertension: Noted on problem list            # Morbid Obesity: Estimated body mass index is 46.76 kg/m  as calculated from the following:    Height as of this encounter: 1.524 m (5').    Weight as of this encounter: 108.6 kg (239 lb 6.7 oz)., PRESENT ON ADMISSION     # Financial/Environmental Concerns: none           Subjective:  Patient resting in bed.  Notes some pain to her right lower extremity.  No pain to left lower extremity.  No breathing issues.  No fever or chills.    PHYSICAL EXAM  Temp:  [98.2  F (36.8  C)-98.8  F (37.1  C)] 98.2  F (36.8  C)  Pulse:  [71-78] 72  Resp:  [16-20] 16  BP: (117-150)/(55-65) 117/55  SpO2:  [87 %-98 %] 95 %  Wt Readings from Last 1 Encounters:   07/08/25 108.6 kg (239 lb 6.7 oz)       Intake/Output Summary (Last 24 hours) at 7/9/2025 1334  Last data filed at 7/9/2025 1148  Gross per 24 hour   Intake 105 ml   Output 2150 ml   Net -2045 ml      Body mass index is 46.76 kg/m .    GENRL: Alert and answering questions appropriately. Not in acute distress. Lying in bed   CHEST: Clear to auscultation bilaterally. No wheezes, rhonchi or crackles. Breathing easily   HEART: Regular rate   EXTRM: Chronic lymphedema.  Right lower extremity with dressing in place extending from foot up to below the knee.  Left lower extremity with dressing to foot and ankle.  No edema noted.  Poorly palpated DP Pulse.  PSYCH: Normal affect and mood.   INTGM: Chronic skin changes related to lymphedema to lower extremities.      Medical Decision Making       55 MINUTES SPENT BY ME on the date of service doing chart review, history, exam, documentation & further activities per the note.      PERTINENT LABS/IMAGING:  Results for orders placed or performed during the hospital encounter of 07/07/25   US Lower Extremity Venous Duplex Bilateral    Impression    IMPRESSION:  1.  No deep venous thrombosis in the bilateral  "lower extremities.  2.  5.4 cm mildly complex collection in the left popliteal fossa, likely represents Baker's cyst.     XR Chest Port 1 View    Impression    IMPRESSION: Cardiomediastinal silhouette within normal limits. No focal consolidation or pleural effusion. Mild elevation right hemidiaphragm. Degenerative change osseous structures.   XR Chest Port 1 View    Impression    IMPRESSION:   1.  Patient rotation to the right accounts for altered mediastinal contours.  2.  Left PICC tip in lower SVC. No pneumothorax.  3.  Mild interstitial pulmonary edema without pleural effusion.  4.  Normal heart size.     Most Recent 3 CBC's:  Recent Labs   Lab Test 07/09/25  0546 07/08/25  0714 07/07/25  2308   WBC 8.0 11.5* 8.5   HGB 8.7* 9.4* 10.4*   MCV 83 81 83    311 375     Most Recent 3 BMP's:  Recent Labs   Lab Test 07/09/25  0546 07/08/25  0714 07/07/25  2308    141 138   POTASSIUM 4.1 4.0 5.0   CHLORIDE 105 106 103   CO2 26 27 25   BUN 24.7* 22.8 29.6*   CR 1.05* 0.88 0.92   ANIONGAP 8 8 10   JUNI 8.5* 8.7* 9.2   * 91 99     Most Recent 2 LFT's:  Recent Labs   Lab Test 07/01/25  1439 05/16/25  1529   AST 16 18   ALT 19 17   ALKPHOS 131 118   BILITOTAL <0.2 0.2       Recent Labs   Lab Test 02/13/24  1143   CHOL 207*   HDL 69   *   TRIG 122     Recent Labs   Lab Test 02/13/24  1143 02/10/23  1535 11/10/21  1551   * 80 68     Recent Labs   Lab Test 07/09/25  0546      POTASSIUM 4.1   CHLORIDE 105   CO2 26   *   BUN 24.7*   CR 1.05*   GFRESTIMATED 54*   JUNI 8.5*     Recent Labs   Lab Test 03/20/25  0640 04/04/22  0523   A1C 6.0* 5.4     Recent Labs   Lab Test 07/09/25  0546 07/08/25  0714 07/07/25  2308   HGB 8.7* 9.4* 10.4*     No results for input(s): \"TROPONINI\" in the last 68822 hours.  Recent Labs   Lab Test 07/07/25  2308 03/12/25  1600 01/12/25  1104 09/07/24  1347 07/19/24  0710   [facility]  --  520  --  [phone]   [facility] 125  --  224  --   --      Recent Labs   Lab Test " 03/26/25 2054   TSH 5.42*     Recent Labs   Lab Test 03/12/25  1600   INR 1.11       Wanda Lipscomb DO  Hospitalist Service  [facility]

## 2025-07-09 NOTE — PROGRESS NOTES
[facility] ACUTE PAIN SERVICE    [facility] [place], [facility], [facility], [facility], [place]   PAIN Progress Note    Assessment/Plan:  Elizabeth Kelley is a 79 year old female who was admitted on 7/7/2025.  Pain team was asked to see the patient for severe leg pain, cellulitis.  Admitted for bilateral lower leg cellulitis, chronic bilateral lower leg lymphedema. History of  MS, anemia, edema, melanoma, lymphedema, chronic diastolic heart failure, sepsis, hypertensive HF, hypokalemia, OA, CKD3, GERD, Hx hemorrhage. Infectious Disease consulted for cellulitis.  The patient does not smoke and denies chemical dependency history.      Opioid Induced Respiratory Depression Risk Assessment:  The patient's home MME was about 5-10  mg daily.     Patient has used 0.9 mg of IV Dilaudid and 4 mg of oral Dilaudid in 24 hours    Pain is well-controlled.  Pain team will sign off.  Discussed with hospital medicine service     PLAN:   1) Pain is consistent with bilateral leg pain d/t bilateral lower leg cellulitis  Multimodal Medication Therapy  Topical: none  NSAID'S: Estimated Creatinine Clearance: 50.1 mL/min (A) (based on SCr of 1.05 mg/dL (H)).  No NSAIDs   Steroids: none  Muscle Relaxants: none  Adjuvants: Tylenol 975 mg q 6 h, ropinirole scheduled, Gabapentin 100 mg tid - increase to 300 mg bid and monitor renal function   Antidepressants/anxiolytics: citalopram 40 mg daily, Wellbutrin  mg q am, Atarax 10 mg q 6 h prn itching, anxiety, sleep, pain  Opioids: Dilaudid 1-2 mg q 3 h prn -has only used 4 mg of oral Dilaudid in 24 hours, recommend using oral medications first-line  IV Pain medication:  discontinue -she is only used 1 dose of IV Dilaudid today, will stop this as she has p.o. Dilaudid available  Non-medication interventions: rest, PT  Constipation Prophylaxis: senna/docusate 1-2 bid, prn senna/docusate     -Opioid prescriber has been Dr. Herrmann, PCP  -MN  pulled from system on 7/8/25. This indicates prior opioid  use.  06/24/25 oxycodone 5 mg #16  06/11/25 oxycodone 5 mg #7  06/04/25 gabapentin 100 mg #270(90 day supply)  05/32/25 oxycodone 5 mg #7  Discharge Recommendations - We recommend prescribing the following at the time of discharge: Likely Tylenol, gabapentin, small supply Dilaudid    Subjective:  Describes pain as 3-5/10 and aching, sore, throbbing in the bilateral lower extremities.  Patient is seen up in a chair.  She reports her pain is currently well-controlled.  She denies any nausea, vomiting, fever, chills, chest pain, shortness of breath.     Principal Problem:  Bilateral cellulitis of lower leg     Patient Active Problem List   Diagnosis    Venous hypertension of lower extremity, bilateral    Acquired lymphedema of leg    Scar condition and fibrosis of skin    Ankle contracture, left    Valgus deformity of both feet    Flat feet, bilateral    Gait abnormality    MS (multiple sclerosis) (H)    Depression    Vitamin D deficiency    GERD (gastroesophageal reflux disease)    Essential hypertension    History of malignant melanoma of skin    Edema    Major depression, single episode, in complete remission    Menopausal and postmenopausal disorder    Mixed hyperlipidemia    Morbid obesity -- BMI 48.8    Peripheral venous insufficiency    Postmenopausal    Restless legs    Chronic pain    Ulcer of right lower extremity with fat layer exposed (H)    Pain associated with wound    Impaired glucose tolerance    Iron deficiency anemia    Snoring    Cellulitis    Hypoxia    Elevated troponin    Chronic ulcer of right leg (H)    Cellulitis of right lower leg    Acute sepsis (H)    Cellulitis of left lower extremity    KIKE (acute kidney injury)    Weakness generalized    Diarrhea    Chronic blood loss anemia    Hiatal hernia    Hemorrhage due to chronic Brian lesion    Chronic kidney disease, stage 3a (H)    Cellulitis of right lower extremity    Bilateral cellulitis of lower leg        Objective:    History   Drug Use  No          Tobacco Use      Smoking status: Former        Packs/day: 0.00        Years: 0.3 packs/day for 12.0 years (3.0 ttl pk-yrs)        Types: Cigarettes        Start date: 1963        Quit date: 1975        Years since quittin.5      Smokeless tobacco: Never      Tobacco comments: quit more than 30 years ago      Vital signs in last 24 hours:  /55 (BP Location: Right arm)   Pulse 72   Temp 98.2  F (36.8  C) (Oral)   Resp 16   Ht 1.524 m (5')   Wt 108.6 kg (239 lb 6.7 oz)   SpO2 95%   BMI 46.76 kg/m      Weight:     Vitals:    25 1712 25 1841   Weight: 110.5 kg (243 lb 8 oz) 108.6 kg (239 lb 6.7 oz)      Weight change: -1.851 kg (-4 lb 1.3 oz)  Body mass index is 46.76 kg/m .    Intake/Output last 3 shifts:  I/O last 3 completed shifts:  In: 845 [P.O.:720; I.V.:125]  Out: 1450 [Urine:1450]  Intake/Output this shift:  No intake/output data recorded.    Review of Systems:   As per subjective, all others negative.    Physical Exam:  General Appearance:  Alert, cooperative, no distress, up in a chair   Head:  Normocephalic, without obvious abnormality, atraumatic   Eyes:  PERRL, conjunctiva/corneas clear, EOM's intact   Nose: Nares normal, septum midline   Throat: Lips, mucosa, and tongue normal; teeth and gums normal   Neck: Supple, symmetrical, trachea midline   Back:   Symmetric, no curvature, ROM normal   Lungs:   Clear to auscultation bilaterally, respirations unlabored   Chest Wall:  No tenderness or deformity   Heart:  Regular rate and rhythm, S1, S2 normal    Abdomen:   Soft, non-tender, obese   Extremities: Chronic lymphedema, bilateral lower extremities with dressings in place   Skin: Erythema to bilateral lower extremities, chronic lymphedema changes to lower extremities   Neurologic: Alert and oriented X 3, Moves all 4 extremities   Psych: Affect is appropriate     Imaging: Reviewed I have personally reviewed pertinent notes, labs, tests, and radiologic imaging  in patient's chart.  Labs: Reviewed I have personally reviewed pertinent notes, labs, tests, and radiologic imaging in patient's chart.  Notes: Reviewed I have personally reviewed pertinent notes, labs, tests, and radiologic imaging in patient's chart.    Total time spent 35 minutes with greater than 50% in consultation, education and coordination of care.   Treatment plan includes: multimodal pain approach, Hospital Medicine Service for medical management, wound care, infectious disease.   Patient educated regarding: multimodal pain approach and medications as listed above.   Elements of Medical Decision Making as described above. Acute or chronic illness or injury or surgery. High risk therapy including opioids, high risk drug therapy including oral and/or parenteral controlled substances.    Patient is understanding of the plan. All questions and concerns addressed to patient's satisfaction.     NIRALI Connor-GRETEL  Acute Care Inpatient Pain Management Program  [facility] ([facility])  Hours of coverage Monday-Friday [id number]. After hours please contact Primary team.   Page via Epic chat or [employer]

## 2025-07-09 NOTE — PLAN OF CARE
Pt up in chair for majority of shift, tolerated well.  Primafit removed, activity encouraged.  Weaned to room air. 94-95%.   IV Dilaudid given x1, pain due to dressing change.

## 2025-07-09 NOTE — PLAN OF CARE
Problem: Adult Inpatient Plan of Care  Goal: Plan of Care Review  Description: The Plan of Care Review/Shift note should be completed every shift.  The Outcome Evaluation is a brief statement about your assessment that the patient is improving, declining, or no change.  This information will be displayed automatically on your shift  note.  Outcome: Progressing  Flowsheets (Taken 7/9/2025 0635)  Outcome Evaluation: pain mananged with prn and scheduled medication  Plan of Care Reviewed With: patient  Overall Patient Progress: improving  Goal: Absence of Hospital-Acquired Illness or Injury  Outcome: Progressing  Intervention: Identify and Manage Fall Risk  Recent Flowsheet Documentation  Taken 7/9/2025 0012 by Mazin Rosas RN  Safety Promotion/Fall Prevention: safety round/check completed  Goal: Optimal Comfort and Wellbeing  Outcome: Progressing  Intervention: Monitor Pain and Promote Comfort  Recent Flowsheet Documentation  Taken 7/9/2025 0634 by Mazin Rosas RN  Pain Management Interventions:   rest   repositioned   relaxation techniques promoted  Taken 7/9/2025 0541 by Mazin Rosas RN  Pain Management Interventions: medication (see MAR)  Taken 7/9/2025 0110 by Mazin Rosas RN  Pain Management Interventions:   rest   repositioned   relaxation techniques promoted  Taken 7/9/2025 0012 by Mazin Rosas RN  Pain Management Interventions:   rest   repositioned   relaxation techniques promoted     Problem: Delirium  Goal: Optimal Coping  Outcome: Progressing  Goal: Improved Behavioral Control  Outcome: Progressing  Goal: Improved Attention and Thought Clarity  Outcome: Progressing  Goal: Improved Sleep  Outcome: Progressing   Goal Outcome Evaluation: pt is alert and oriented. C/o pain to legs. Given scheduled pain medication with relief. Redness in folds. Nystatin powder present in folds. PICC in left arm. Hgb ticking downward. Pt oxygen drops to about 88% during sleep. Declined  supplemental oxygen. Head of bed raised. Up with SBA. Regular diet. IV Abx. Blood culture pending with no growth so far.       Plan of Care Reviewed With: patient    Overall Patient Progress: improvingOverall Patient Progress: improving    Outcome Evaluation: pain mananged with prn and scheduled medication

## 2025-07-09 NOTE — PROGRESS NOTES
07/09/25 1100   Appointment Info   Signing Clinician's Name / Credentials (PT) Spring Miguel PT   Rehab Comments (PT) Patient in bed .left up in chair with legs elevated .   Living Environment   People in Home spouse   Current Living Arrangements house   Home Accessibility stairs to enter home   Number of Stairs, Main Entrance 4   Stair Railings, Main Entrance railings on both sides of stairs   Transportation Anticipated family or friend will provide   Self-Care   Usual Activity Tolerance moderate   Current Activity Tolerance fair   Equipment Currently Used at Home cane, quad   Fall history within last six months no   Activity/Exercise/Self-Care Comment Amb with no device in the house ,uses quad cane outside.Independent with mobility and ADL.Has RN for dressing changes.   General Information   Onset of Illness/Injury or Date of Surgery 07/07/25   Referring Physician Wanda Lipscomb   Patient/Family Therapy Goals Statement (PT) return home   Pertinent History of Current Problem (include personal factors and/or comorbidities that impact the POC) Admitted with cellulitis manuel l/E .Patient has ahx of MS,HTN ,sepsis,CKD,lympedema,CHF,obesisty   Existing Precautions/Restrictions no known precautions/restrictions   Weight-Bearing Status - LLE weight-bearing as tolerated   Weight-Bearing Status - RLE weight-bearing as tolerated   Cognition   Affect/Mental Status (Cognition) WFL   Orientation Status (Cognition) oriented x 4   Follows Commands (Cognition) follows multi-step commands   Pain Assessment   Patient Currently in Pain Yes, see Vital Sign flowsheet   Range of Motion (ROM)   Range of Motion ROM deficits secondary to pain;ROM deficits secondary to swelling  (large size)   Strength (Manual Muscle Testing)   Strength (Manual Muscle Testing) Deficits observed during functional mobility   Bed Mobility   Supine-Sit [place] (Bed Mobility) supervision   Bed Mobility Limitations decreased ability to use legs for  bridging/pushing;decreased ability to use arms for pushing/pulling   Impairments Contributing to Impaired Bed Mobility pain;other (see comments)  (large size)   Assistive Device (Bed Mobility) bed rails   Comment, (Bed Mobility) needed extra time and effort to get OOB   Transfers   Transfers sit-stand transfer   Maintains Weight-bearing Status (Transfers) able to maintain   Sit-Stand Transfer   Sit-Stand [place] (Transfers) contact guard   Assistive Device (Sit-Stand Transfers) cane, quad   Comment, (Sit-Stand Transfer) bed elevated   Gait/Stairs (Locomotion)   [place] Level (Gait) contact guard;minimum assist (75% patient effort)   Assistive Device (Gait) cane, quad   Distance in Feet (Gait) 10 feet   Pattern (Gait) step-through   Deviations/Abnormal Patterns (Gait) base of support, wide   Maintains Weight-bearing Status (Gait) able to maintain   Clinical Impression   Criteria for Skilled Therapeutic Intervention Yes, treatment indicated   PT Diagnosis (PT) impaired functional mobility   Influenced by the following impairments pain ,obesisty   Functional limitations due to impairments bed mobility ,transfers,gen strength,gait ,balance   Clinical Presentation (PT Evaluation Complexity) stable   Clinical Presentation Rationale paient preesnts as med diagnosed   Clinical Decision Making (Complexity) low complexity   Planned Therapy Interventions (PT) balance training;bed mobility training;gait training;stair training;transfer training   Risk & Benefits of therapy have been explained evaluation/treatment results reviewed;participants included;patient   PT Total Evaluation Time   PT Chelsey Low Complexity Minutes (87905) 12   Physical Therapy Goals   PT Frequency 6x/week   PT Predicted Duration/Target Date for Goal Attainment 07/16/25   PT Goals Bed Mobility;Transfers;Gait;Stairs   PT: Bed Mobility Independent;Supine to/from sit   PT: Transfers Modified independent;Sit to/from stand;Assistive device   PT: Gait  Supervision/stand-by assist;Assistive device;Greater than 200 feet   PT: Stairs Supervision/stand-by assist;4 stairs;Rail on left;Assistive device  (quad cane)   Interventions   Interventions Quick Adds Gait Training   Gait Training   Gait Training Minutes (81508) 8   Symptoms Noted During/After Treatment (Gait Training) fatigue   Treatment Detail/Skilled Intervention assisted with shoes,pt had asmall loss of balance at the doorway coming out of room.waddling type of gait-cues for safety and proper use of cane   Distance in Feet 60 feet   [place] Level (Gait Training) minimum assist (75% patient effort)   Physical Assistance Level (Gait Training) verbal cues;1 person assist   Weight Bearing (Gait Training) weight-bearing as tolerated   Assistive Device (Gait Training) quad cane   Gait Analysis Deviations increased stride width;decreased toe-to-floor clearance   Impairments (Gait Analysis/Training) balance impaired;pain;strength decreased   PT Discharge Planning   PT Plan distance amb with quad cane vs FWW,steps when ready   PT Discharge Recommendation (DC Rec) home with assist   PT Rationale for DC Rec Patient should progress well and be able to return home .Spouse able to assist.DEnied needs for home PT -has HEP from previous hospitalizations and home   PT Brief overview of current status sBA mobility ,CGA /min assist to amb 70 feet with quad cane   PT Total Distance Amb During Session (feet) 70   PT Equipment Needed at Discharge walker, rolling;cane, quad   Physical Therapy Time and Intention   Timed Code Treatment Minutes 8   Total Session Time (sum of timed and untimed services) 20

## 2025-07-09 NOTE — PLAN OF CARE
Problem: Adult Inpatient Plan of Care  Goal: Plan of Care Review  Description: The Plan of Care Review/Shift note should be completed every shift.  The Outcome Evaluation is a brief statement about your assessment that the patient is improving, declining, or no change.  This information will be displayed automatically on your shift  note.  Outcome: Progressing   Goal Outcome Evaluation:       Pt alert and oriented x4. SBA of 1 with cane. Pure wick in place. On iv abx.

## 2025-07-09 NOTE — PROGRESS NOTES
Dual skin assessment done with charge nurse. Pt has bilateral groin and gluteal cleft yeasty redness. She also has wounds to left and right shin that the dressing change was done by WOC nurse in ED.

## 2025-07-10 ENCOUNTER — APPOINTMENT (OUTPATIENT)
Dept: PHYSICAL THERAPY | Facility: HOSPITAL | Age: 80
End: 2025-07-10
Payer: COMMERCIAL

## 2025-07-10 ENCOUNTER — APPOINTMENT (OUTPATIENT)
Dept: OCCUPATIONAL THERAPY | Facility: HOSPITAL | Age: 80
End: 2025-07-10
Attending: INTERNAL MEDICINE
Payer: COMMERCIAL

## 2025-07-10 VITALS
BODY MASS INDEX: 47 KG/M2 | SYSTOLIC BLOOD PRESSURE: 117 MMHG | WEIGHT: 239.42 LBS | HEIGHT: 60 IN | DIASTOLIC BLOOD PRESSURE: 58 MMHG | RESPIRATION RATE: 18 BRPM | TEMPERATURE: 98.1 F | HEART RATE: 65 BPM | OXYGEN SATURATION: 98 %

## 2025-07-10 LAB
ANION GAP SERPL CALCULATED.3IONS-SCNC: 6 MMOL/L (ref 7–15)
BACTERIA SPEC CULT: NORMAL
BACTERIA SPEC CULT: NORMAL
BUN SERPL-MCNC: 21.8 MG/DL (ref 8–23)
CALCIUM SERPL-MCNC: 8.7 MG/DL (ref 8.8–10.4)
CHLORIDE SERPL-SCNC: 105 MMOL/L (ref 98–107)
CREAT SERPL-MCNC: 0.97 MG/DL (ref 0.51–0.95)
EGFRCR SERPLBLD CKD-EPI 2021: 60 ML/MIN/1.73M2
ERYTHROCYTE [DISTWIDTH] IN BLOOD BY AUTOMATED COUNT: 16 % (ref 10–15)
GLUCOSE SERPL-MCNC: 100 MG/DL (ref 70–99)
HCO3 SERPL-SCNC: 28 MMOL/L (ref 22–29)
HCT VFR BLD AUTO: 27.5 % (ref 35–47)
HGB BLD-MCNC: 8.5 G/DL (ref 11.7–15.7)
MCH RBC QN AUTO: 25.6 PG (ref 26.5–33)
MCHC RBC AUTO-ENTMCNC: 30.9 G/DL (ref 31.5–36.5)
MCV RBC AUTO: 83 FL (ref 78–100)
PLATELET # BLD AUTO: 255 10E3/UL (ref 150–450)
POTASSIUM SERPL-SCNC: 3.6 MMOL/L (ref 3.4–5.3)
RBC # BLD AUTO: 3.32 10E6/UL (ref 3.8–5.2)
SODIUM SERPL-SCNC: 139 MMOL/L (ref 135–145)
VANCOMYCIN SERPL-MCNC: 9 UG/ML (ref ?–25)
WBC # BLD AUTO: 6.2 10E3/UL (ref 4–11)

## 2025-07-10 PROCEDURE — 250N000011 HC RX IP 250 OP 636: Performed by: INTERNAL MEDICINE

## 2025-07-10 PROCEDURE — 99232 SBSQ HOSP IP/OBS MODERATE 35: CPT | Performed by: INTERNAL MEDICINE

## 2025-07-10 PROCEDURE — 250N000013 HC RX MED GY IP 250 OP 250 PS 637: Performed by: HOSPITALIST

## 2025-07-10 PROCEDURE — 80048 BASIC METABOLIC PNL TOTAL CA: CPT | Performed by: HOSPITALIST

## 2025-07-10 PROCEDURE — 120N000001 HC R&B MED SURG/OB

## 2025-07-10 PROCEDURE — 97165 OT EVAL LOW COMPLEX 30 MIN: CPT | Mod: GO

## 2025-07-10 PROCEDURE — 97535 SELF CARE MNGMENT TRAINING: CPT | Mod: GO

## 2025-07-10 PROCEDURE — 85027 COMPLETE CBC AUTOMATED: CPT | Performed by: HOSPITALIST

## 2025-07-10 PROCEDURE — 97116 GAIT TRAINING THERAPY: CPT | Mod: GP

## 2025-07-10 PROCEDURE — 250N000013 HC RX MED GY IP 250 OP 250 PS 637: Performed by: NURSE PRACTITIONER

## 2025-07-10 PROCEDURE — 258N000003 HC RX IP 258 OP 636: Performed by: HOSPITALIST

## 2025-07-10 PROCEDURE — 80202 ASSAY OF VANCOMYCIN: CPT | Performed by: INTERNAL MEDICINE

## 2025-07-10 PROCEDURE — 97530 THERAPEUTIC ACTIVITIES: CPT | Mod: GP

## 2025-07-10 PROCEDURE — 250N000011 HC RX IP 250 OP 636: Performed by: HOSPITALIST

## 2025-07-10 RX ORDER — HYDROMORPHONE HYDROCHLORIDE 1 MG/ML
0.5 INJECTION, SOLUTION INTRAMUSCULAR; INTRAVENOUS; SUBCUTANEOUS
Status: COMPLETED | OUTPATIENT
Start: 2025-07-10 | End: 2025-07-10

## 2025-07-10 RX ADMIN — Medication 1 TABLET: at 08:26

## 2025-07-10 RX ADMIN — ACETAMINOPHEN 975 MG: 325 TABLET ORAL at 17:26

## 2025-07-10 RX ADMIN — ROPINIROLE HYDROCHLORIDE 1 MG: 1 TABLET, FILM COATED ORAL at 21:57

## 2025-07-10 RX ADMIN — SENNOSIDES AND DOCUSATE SODIUM 1 TABLET: 50; 8.6 TABLET ORAL at 21:58

## 2025-07-10 RX ADMIN — HYDROMORPHONE HYDROCHLORIDE 2 MG: 2 TABLET ORAL at 22:07

## 2025-07-10 RX ADMIN — BUPROPION HYDROCHLORIDE 150 MG: 150 TABLET, FILM COATED, EXTENDED RELEASE ORAL at 08:26

## 2025-07-10 RX ADMIN — CEFEPIME HYDROCHLORIDE 2 G: 2 INJECTION, POWDER, FOR SOLUTION INTRAVENOUS at 11:39

## 2025-07-10 RX ADMIN — SODIUM CHLORIDE 1250 MG: 0.9 INJECTION, SOLUTION INTRAVENOUS at 08:28

## 2025-07-10 RX ADMIN — ROPINIROLE HYDROCHLORIDE 0.5 MG: 0.25 TABLET, FILM COATED ORAL at 17:26

## 2025-07-10 RX ADMIN — ENOXAPARIN SODIUM 40 MG: 40 INJECTION SUBCUTANEOUS at 21:57

## 2025-07-10 RX ADMIN — GABAPENTIN 300 MG: 300 CAPSULE ORAL at 08:25

## 2025-07-10 RX ADMIN — FERROUS SULFATE TAB 325 MG (65 MG ELEMENTAL FE) 325 MG: 325 (65 FE) TAB at 08:25

## 2025-07-10 RX ADMIN — HYDROMORPHONE HYDROCHLORIDE 0.5 MG: 1 INJECTION, SOLUTION INTRAMUSCULAR; INTRAVENOUS; SUBCUTANEOUS at 13:40

## 2025-07-10 RX ADMIN — Medication 250 MG: at 08:26

## 2025-07-10 RX ADMIN — CEFEPIME HYDROCHLORIDE 2 G: 2 INJECTION, POWDER, FOR SOLUTION INTRAVENOUS at 00:54

## 2025-07-10 RX ADMIN — SPIRONOLACTONE 25 MG: 25 TABLET, FILM COATED ORAL at 21:57

## 2025-07-10 RX ADMIN — FUROSEMIDE 40 MG: 20 TABLET ORAL at 08:26

## 2025-07-10 RX ADMIN — MAGNESIUM OXIDE TAB 400 MG (241.3 MG ELEMENTAL MG) 400 MG: 400 (241.3 MG) TAB at 08:26

## 2025-07-10 RX ADMIN — ACETAMINOPHEN 975 MG: 325 TABLET ORAL at 06:24

## 2025-07-10 RX ADMIN — Medication 400 UNITS: at 08:27

## 2025-07-10 RX ADMIN — HYDROMORPHONE HYDROCHLORIDE 1 MG: 2 TABLET ORAL at 00:58

## 2025-07-10 RX ADMIN — PANTOPRAZOLE SODIUM 40 MG: 20 TABLET, DELAYED RELEASE ORAL at 08:26

## 2025-07-10 RX ADMIN — SIMVASTATIN 40 MG: 10 TABLET, FILM COATED ORAL at 21:58

## 2025-07-10 RX ADMIN — ACETAMINOPHEN 975 MG: 325 TABLET ORAL at 11:39

## 2025-07-10 RX ADMIN — ENOXAPARIN SODIUM 40 MG: 40 INJECTION SUBCUTANEOUS at 08:27

## 2025-07-10 RX ADMIN — B-COMPLEX W/ C & FOLIC ACID TAB 1 TABLET: TAB at 08:27

## 2025-07-10 RX ADMIN — GABAPENTIN 300 MG: 300 CAPSULE ORAL at 21:58

## 2025-07-10 RX ADMIN — HYDROMORPHONE HYDROCHLORIDE 1 MG: 2 TABLET ORAL at 00:53

## 2025-07-10 RX ADMIN — CITALOPRAM 40 MG: 40 TABLET ORAL at 08:27

## 2025-07-10 RX ADMIN — SENNOSIDES AND DOCUSATE SODIUM 1 TABLET: 50; 8.6 TABLET ORAL at 08:26

## 2025-07-10 RX ADMIN — ROPINIROLE HYDROCHLORIDE 0.5 MG: 0.25 TABLET, FILM COATED ORAL at 08:27

## 2025-07-10 RX ADMIN — ACETAMINOPHEN 975 MG: 325 TABLET ORAL at 00:53

## 2025-07-10 RX ADMIN — ZINC SULFATE 220 MG (50 MG) CAPSULE 220 MG: CAPSULE at 08:27

## 2025-07-10 ASSESSMENT — ACTIVITIES OF DAILY LIVING (ADL)
ADLS_ACUITY_SCORE: 36

## 2025-07-10 NOTE — PROGRESS NOTES
"Care Management Follow Up    Length of Stay (days): 2    Expected Discharge Date: 07/11/2025    Anticipated Discharge Plan:  Home Care    Transportation: Anticipate Family/friend    PT Recommendations: home with assist  OT Recommendations:        Barriers to Discharge: medical stability/ ID following needs 3 to 5 days of IV abx with daily wound cares. WOC following.       Prior Living Situation:   \"Pt lives in a house with S/O, Jaleel. Pt uses cane for ambulation. Pt is independent at baseline. Pt is open with [facility] for RN. Unknown discharge needs, pt goal is to return home with home care.     CM called [facility] and confirmed pt is open with them for home RN.\"    Discussed  Partnership in Safe Discharge Planning  document with patient/family: No     Handoff Completed: No, handoff not indicated or clinically appropriate    Patient/Spokesperson Updated: Yes. Who? Pt     Additional Information:    Pt being followed by specialities.   No CM updates currently.   PT recs home with assist, and pt is agreeable to that and still wants her home RN through [facility] home care.         Next Steps: Follow ID plan. CM will continue to monitor progression of care, review team recommendations and provide discharge planning assist as needed.       Natalya Nash RN  Acute Care Management  [facility]  For further questions/concerns you can reach us at Vocera Web Console    "

## 2025-07-10 NOTE — PLAN OF CARE
Occupational Therapy Discharge Summary    Reason for therapy discharge:    All goals and outcomes met, no further needs identified.    Progress towards therapy goal(s). See goals on Care Plan in [facility] electronic health record for goal details.  Goals met    Therapy recommendation(s):    No further therapy is recommended.

## 2025-07-10 NOTE — PLAN OF CARE
Problem: Adult Inpatient Plan of Care  Goal: Absence of Hospital-Acquired Illness or Injury  Intervention: Identify and Manage Fall Risk  Recent Flowsheet Documentation  Taken 7/9/2025 1700 by Nany Mcgill RN  Safety Promotion/Fall Prevention:   activity supervised   assistive device/personal items within reach   clutter free environment maintained   increased rounding and observation   increase visualization of patient   lighting adjusted   mobility aid in reach   nonskid shoes/slippers when out of bed   toileting scheduled     Problem: Adult Inpatient Plan of Care  Goal: Absence of Hospital-Acquired Illness or Injury  Intervention: Prevent Infection  Recent Flowsheet Documentation  Taken 7/9/2025 1700 by Nany Mcgill RN  Infection Prevention:   rest/sleep promoted   single patient room provided   Dressing changed per order. Afebrile.  Problem: Delirium  Goal: Improved Behavioral Control  Outcome: Progressing  Intervention: Minimize Safety Risk  Recent Flowsheet Documentation  Taken 7/9/2025 1700 by Nany Mcgill RN  Enhanced Safety Measures:   pain management   assistive devices when indicated  Trust Relationship/Rapport:   care explained   emotional support provided   empathic listening provided   questions answered   questions encouraged   reassurance provided  Pt has been alert and coherent.  Problem: Pain Acute  Goal: Optimal Pain Control and Function  Outcome: Progressing  Intervention: Prevent or Manage Pain  Recent Flowsheet Documentation  Taken 7/9/2025 1700 by Nany Mcgill RN  Sensory Stimulation Regulation: care clustered  Medication Review/Management: medications reviewed  Leg pain managed with PRN Dilaudid with good relief.

## 2025-07-10 NOTE — PLAN OF CARE
Shift from 2300 to 0730-      Problem: Pain Acute  Goal: Optimal Pain Control and Function  Outcome: Progressing  Intervention: Develop Pain Management Plan  Recent Flowsheet Documentation  Taken 7/10/2025 0624 by Sofiya Espinoza RN  Pain Management Interventions: medication offered but refused  Taken 7/10/2025 0412 by Sofiya Espinoza, RN  Pain Management Interventions: medication (see MAR)  Taken 7/10/2025 0053 by Sofiya Espinoza RN  Pain Management Interventions: medication (see MAR)  Intervention: Prevent or Manage Pain  Recent Flowsheet Documentation  Taken 7/10/2025 0058 by Sofiya Espinoza RN  Sensory Stimulation Regulation: care clustered  Medication Review/Management: medications reviewed     Goal Outcome Evaluation:  Pt having lower extremities pain.   Pt pain controlled with pain meds. See MAR,  Continued on IV abx.   Lymphedema warps in place.

## 2025-07-10 NOTE — PROGRESS NOTES
07/10/25 1030   Appointment Info   Signing Clinician's Name / Credentials (OT) Gladys Xiong OTR/L   Living Environment   People in Home spouse   Current Living Arrangements house   Home Accessibility stairs to enter home   Stair Railings, Main Entrance railings safe and in good condition   Transportation Anticipated family or friend will provide   Self-Care   Usual Activity Tolerance moderate   Current Activity Tolerance fair   Regular Exercise No   Equipment Currently Used at Home cane, quad   Fall history within last six months no   Activity/Exercise/Self-Care Comment pt typically I with ADL's   Instrumental Activities of Daily Living (IADL)   IADL Comments pt takes care of the household responsibilities   General Information   Onset of Illness/Injury or Date of Surgery 07/07/25   Referring Physician Dr Lipscomb   Patient/Family Therapy Goal Statement (OT) go home soon   Additional Occupational Profile Info/Pertinent History of Current Problem 79 year old female with a pertinent history of MS, anemia, edema, melanoma, lymphedema, chronic diastolic heart failure, sepsis, hypertensive HF, hypokalemia, osteoarthritis, CKD stage 3, GERD, hemorrhage, HTN, and malignant melanoma, who presents to this ED with bilateral cellulitis of lower extremities.   Cognitive Status Examination   Orientation Status orientation to person, place and time   Affect/Mental Status (Cognitive) WNL   Follows Commands WNL   Pain Assessment   Patient Currently in Pain Yes, see Vital Sign flowsheet  (RLE but mostly only during drsg change)   Range of Motion Comprehensive   General Range of Motion no range of motion deficits identified   Strength Comprehensive (MMT)   General Manual Muscle Testing (MMT) Assessment no strength deficits identified   Transfers   Transfers bed-chair transfer;sit-stand transfer   Transfer Skill: Bed to Chair/Chair to Bed   Bed-Chair [place] (Transfers) supervision   Assistive Device (Bed-Chair  Transfers) straight cane   Sit-Stand Transfer   Sit-Stand [place] (Transfers) supervision   Assistive Device (Sit-Stand Transfers) cane, straight   Activities of Daily Living   BADL Assessment/Intervention lower body dressing;toileting   Lower Body Dressing Assessment/Training   Comment, (Lower Body Dressing) pt mainly uses slip on shoes, no socks and states she doesn't normally have trouble with pants/underwear   Toileting   [place] Level (Toileting) toileting skills;adjust/manage clothing;perform perineal hygiene;supervision   Position (Toileting) unsupported sitting   Clinical Impression   Criteria for Skilled Therapeutic Interventions Met (OT) Yes, treatment indicated   OT Diagnosis decreased ADL safety   Influenced by the following impairments weakness   OT Problem List-Impairments impacting ADL mobility   Assessment of Occupational Performance 1-3 Performance Deficits   Identified Performance Deficits trsfs, safety with ADL's   Planned Therapy Interventions (OT) joint mobilzation;ADL retraining   Clinical Decision Making Complexity (OT) problem focused assessment/low complexity   Risk & Benefits of therapy have been explained evaluation/treatment results reviewed;patient   OT Total Evaluation Time   OT Eval, Low Complexity Minutes (77634) 15   OT Goals   Therapy Frequency (OT) One time eval and treatment   OT Predicted Duration/Target Date for Goal Attainment 07/10/25   OT: Transfer Supervision/stand-by assist   OT: Toilet Transfer/Toileting Supervision/stand-by assist;toilet transfer;cleaning and garment management   Self-Care/Home Management   Self-Care/Home Mgmt/ADL, Compensatory, Meal Prep Minutes (92714) 10   Treatment Detail/Skilled Intervention Eval completed, treatment initiated.  Pt completed trsfs in room with SBA, slow pace but no LOB noted during mobility and trsfs tasked.  Toilet, chair and bed trsfs completed with SBA, minor cues for safe trsf tech.  Completed toileting task with SBA.   Pt educated on ADL safety and fall prevention including use of AD as needed, throw rugs, seated dressing tasks.  Pt uses slip on shoes for dressing tasks.  Pt appears close to baseline with ADL functioning and states she feels safe to discharge home when MD allows.  Pt left seated in chair with call light.   OT Discharge Planning   OT Plan no further OT needed at this time   OT Discharge Recommendation (DC Rec) home with assist   OT Rationale for DC Rec Pt okay to discharge home from OT standpoint when MD approves.   OT Brief overview of current status SBA trsfs, mobility   OT Total Distance Amb During Session (feet) 25   Total Session Time   Timed Code Treatment Minutes 10   Total Session Time (sum of timed and untimed services) 25

## 2025-07-10 NOTE — PHARMACY-VANCOMYCIN DOSING SERVICE
Pharmacy Vancomycin Note  Date of Service July 10, 2025  Patient's  1945   79 year old, female    Indication: Skin and Soft Tissue Infection  Day of Therapy: 3  Current vancomycin regimen:  1250 mg IV q24h  Current vancomycin monitoring method: AUC  Current vancomycin therapeutic monitoring goal: 400-600 mg*h/L    [employer] Prediction of Current Vancomycin Regimen  Loading dose: N/A  Regimen: 1250 mg IV every 24 hours.  Start time: 07:26 on 07/10/2025  Exposure target: AUC24 (range) 400-600 mg/L.hr   AUC24,ss: 458 mg/L.hr  Probability of AUC24 > 400: 73 %  Ctrough,ss: 9 mg/L  Probability of Ctrough,ss > 20: 0 %  Probability of nephrotoxicity (Lodise LOUIS ): 5 %      Current estimated CrCl = Estimated Creatinine Clearance: 54.2 mL/min (A) (based on SCr of 0.97 mg/dL (H)).    Creatinine for last 3 days  2025: 11:08 PM Creatinine 0.92 mg/dL  2025:  7:14 AM Creatinine 0.88 mg/dL  2025:  5:46 AM Creatinine 1.05 mg/dL  7/10/2025:  6:35 AM Creatinine 0.97 mg/dL    Recent Vancomycin Levels (past 3 days)  7/10/2025:  6:35 AM Vancomycin 9.0 ug/mL    Vancomycin IV Administrations (past 72 hours)                     vancomycin (VANCOCIN) 1,250 mg in sodium chloride 0.9 % 250 mL intermittent infusion (mg) 1,250 mg New Bag 25 0538    vancomycin (VANCOCIN) 2,000 mg in sodium chloride 0.9 % 500 mL intermittent infusion (mg) 2,000 mg New Bag 25 0250                    Nephrotoxins and other renal medications (From now, onward)      Start     Dose/Rate Route Frequency Ordered Stop    25 0600  vancomycin (VANCOCIN) 1,250 mg in sodium chloride 0.9 % 250 mL intermittent infusion         1,250 mg  over 90 Minutes Intravenous EVERY 24 HOURS 25 0637      25 0800  furosemide (LASIX) tablet 40 mg        Note to Pharmacy: PTA Sig:Take 40 mg by mouth daily.      40 mg Oral DAILY 25 0721                 Contrast Orders - past 72 hours (72h ago, onward)      None             Interpretation of levels and current regimen:  Vancomycin level is reflective of -600    Has serum creatinine changed greater than 50% in last 72 hours: No    Urine output:  good urine output    Renal Function: Stable    I  Plan:  Continue Current Dose  Vancomycin monitoring method: AUC  Vancomycin therapeutic monitoring goal: 400-600 mg*h/L  Pharmacy will check vancomycin levels as appropriate in 1-3 Days.  Serum creatinine levels will be ordered daily for the first week of therapy and at least twice weekly for subsequent weeks.    Gely Orozco, [facility]   Skyrizi Counseling: I discussed with the patient the risks of risankizumab-rzaa including but not limited to immunosuppression, and serious infections.  The patient understands that monitoring is required including a PPD at baseline and must alert us or the primary physician if symptoms of infection or other concerning signs are noted.

## 2025-07-10 NOTE — PROGRESS NOTES
Infectious Diseases Progress Note  [facility]    Date of visit: 07/10/2025     ASSESSMENT   79-year-old woman with chronic lymphedema, hyperlipidemia, diastolic dysfunction, hypertension, multiple sclerosis admitted with worsening right leg infection.    Chronic right leg wound secondary to lymphedema.  Worsening appearance noted by home nurse.  Swab culture growing 11 different organisms.  No signs of systemic infection.  Mild leukocytosis, now normal.    Principal Problem:    Bilateral cellulitis of lower leg  Active Problems:    Acquired lymphedema of leg    Depression    GERD (gastroesophageal reflux disease)    Essential hypertension    Menopausal and postmenopausal disorder    Morbid obesity -- BMI 48.8    Weakness generalized    Cellulitis of right lower extremity       PLAN   -Difficult to interpret surface wound culture as this represents wound colonization and might not accurately reflect etiology of infection.  Identification of 11 organisms is more of a sign of the excellent work that our microbiology lab does.  - Empiric broad coverage with vancomycin and cefepime  -Anticipate 5 days of IV antibiotics with daily wound cares followed by amoxicillin/clauvulanic acid at home      Son Rodriguez MD  [facility]  Direct messaging: [employer] Paging  On-Call ID provider: [phone], option: 9      ===========================================      SUBJECTIVE / INTERVAL HISTORY:     No events. Doing well. Dressings changed today      Antibiotics   Vancomycin 7/8-  Cefepime 7/8-    Previous:  None       Physical Exam     Temp:  [97.8  F (36.6  C)-98.2  F (36.8  C)] 98.2  F (36.8  C)  Pulse:  [65-76] 65  Resp:  [16-18] 18  BP: (120-130)/(55-61) 120/55  SpO2:  [95 %-98 %] 95 %    /55 (BP Location: Right arm)   Pulse 65   Temp 98.2  F (36.8  C) (Oral)   Resp 18   Ht 1.524 m (5')   Wt 108.6 kg (239 lb 6.7 oz)   SpO2 95%   BMI 46.76 kg/m      GENERAL:  well-developed,  well-nourished, lying in bed in no acute distress.   HENT:  Head is normocephalic, atraumatic.   EYES:  Eyes have anicteric sclerae without conjunctival injection   LUNGS:  normal respiratory pattern   EXT: Chronic lymphedema changes in the left lower leg. Legs wrapped today  SKIN:  No acute rashes.  Left arm PICC line is in place without any surrounding erythema.   NEUROLOGIC:  Grossly nonfocal.    Right lower leg 7/8:              Cultures   7/8 blood cultures x 2: no growth to date     Susceptibility data from last 90 days.  Collected Specimen Info Organism Ampicillin Ampicillin/Sulbactam Cefazolin Cefepime Ceftazidime Ceftriaxone Ciprofloxacin Clindamycin Daptomycin Doxycycline Erythromycin Gentamicin Gentamicin Synergy Levofloxacin   07/02/25 Wound from Leg, Right Proteus vulgaris  R  S  R  S  S  S  S      S   S     Citrobacter freundii complex R R   S R R  I      S   I     Pseudomonas aeruginosa     S  S   R        R     Klebsiella pneumoniae R  S   S  S  S  S      S   S     Enterobacter cloacae complex R R   S R R  S      S   S     Staphylococcus aureus        R  S  I  R  S       Enterococcus avium  S             S      Enterococcus faecalis  S             R      Stenotrophomonas maltophilia               S     Corynebacterium striatum                   Streptococcus constellatus                   Collected Specimen Info Organism Meropenem Minocycline Oxacillin Piperacillin/Tazobactam Tetracycline Trimethoprim/Sulfamethoxazole  Vancomycin   07/02/25 Wound from Leg, Right Proteus vulgaris  S    S   S      Citrobacter freundii complex  S   R   S      Pseudomonas aeruginosa  S    S        Klebsiella pneumoniae  S    S   S      Enterobacter cloacae complex  S   R   S      Staphylococcus aureus    S   R  R  S     Enterococcus avium        S     Enterococcus faecalis        S     Stenotrophomonas maltophilia   S     S      Corynebacterium striatum            Streptococcus constellatus                 Pertinent  Labs:     Recent Labs   Lab 07/10/25  0635 07/09/25  0546 07/08/25  0714   WBC 6.2 8.0 11.5*   HGB 8.5* 8.7* 9.4*    261 311       Recent Labs   Lab 07/10/25  0635 07/09/25  0546 07/08/25  0714    139 141   CO2 28 26 27   BUN 21.8 24.7* 22.8       Recent Labs   Lab 07/07/25  2308   CRPI 57.20*           Imaging:     XR Chest Port 1 View  Result Date: 7/8/2025  EXAM: XR CHEST PORT 1 VIEW LOCATION: [facility] DATE: 7/8/2025 INDICATION: PICC placement COMPARISON: None.     IMPRESSION: 1.  Patient rotation to the right accounts for altered mediastinal contours. 2.  Left PICC tip in lower SVC. No pneumothorax. 3.  Mild interstitial pulmonary edema without pleural effusion. 4.  Normal heart size.    US Lower Extremity Venous Duplex Bilateral  Result Date: 7/7/2025  EXAM: US LOWER EXTREMITY VENOUS DUPLEX BILATERAL LOCATION: [facility] DATE: 7/7/2025 INDICATION: Bilateral leg swelling and cellulitis. COMPARISON: Lower extremity venous on 3/12/2025. TECHNIQUE: Venous Duplex ultrasound of bilateral lower extremities with and without compression, augmentation and duplex. Color flow and spectral Doppler with waveform analysis performed. FINDINGS: Technically challenging exam due to patient's body habitus and presence of soft tissue edema. Within this limitation, there is: Exam includes the common femoral, femoral, popliteal veins as well as segmentally visualized deep calf veins and greater saphenous vein. RIGHT: No deep vein thrombosis, although the calf veins are not visualized due to presence of overlying wound dressing. No superficial thrombophlebitis. No popliteal cyst. LEFT: No deep vein thrombosis. No superficial thrombophlebitis. There is 1.8 x 5.4 x 1.5 cm mildly complex collection in the popliteal fossa, likely represents Baker's cyst.     IMPRESSION: 1.  No deep venous thrombosis in the bilateral lower extremities. 2.  5.4 cm mildly complex collection in  the left popliteal fossa, likely represents Baker's cyst.     XR Chest Port 1 View  Result Date: 7/7/2025  EXAM: XR CHEST PORT 1 VIEW LOCATION: [facility] DATE: 7/7/2025 INDICATION: Increased edema. COMPARISON: 01/12/2025.     IMPRESSION: Cardiomediastinal silhouette within normal limits. No focal consolidation or pleural effusion. Mild elevation right hemidiaphragm. Degenerative change osseous structures.          Data reviewed today: I reviewed all medications, new labs and imaging results over the last 24 hours. I personally reviewed no images or EKG's today.  The patient's care was discussed with the Patient.

## 2025-07-10 NOTE — PLAN OF CARE
Pt up in chair majority of shift, Pt tolerated well.  Pt experienced 7/10 pain during dressing change. Requested IV Dilaudid.  PRN IV Dilaudid given x1.

## 2025-07-10 NOTE — PROGRESS NOTES
[facility]    PROGRESS NOTE - Hospitalist Service    ASSESSMENT AND PLAN     Principal Problem:    Bilateral cellulitis of lower leg  Active Problems:    Acquired lymphedema of leg    Depression    GERD (gastroesophageal reflux disease)    Essential hypertension    Menopausal and postmenopausal disorder    Morbid obesity -- BMI 48.8    Weakness generalized    Cellulitis of right lower extremity    Elizabeth Kelley is a 79 year old female with a pertinent history of MS, anemia, edema, melanoma, lymphedema, chronic diastolic heart failure, sepsis, hypertensive HF, hypokalemia, osteoarthritis, CKD stage 3, GERD, hemorrhage, HTN, and malignant melanoma, who presents to this ED with bilateral cellulitis of lower extremities.       Bilateral cellulitis lower legs  - Patient notes acute on chronic infections for years.    - Swab culture results antibiotic resistance with Pseudomonas Enterococcus.   -PTA daily minocycline 50 mg for prevention  - ID commending vancomycin and cefepime.  Follow-up further recs.       Brief history    Admitted 1/12 - 1/18/2025 for RIGHT lower extremity cellulitis and sepsis, treated with meropenem and vancomycin, and she was discharged on doxycycline.    Hospitalized 3/12 - 3/25/25 for LEFT lower extremity cellulitis. She was treated with Vanco and Zosyn/ceftriaxone and discharged with Keflex.      Chronic lymphedema     MS  -walks with cane outside home  -in home no cane use  PT/OT to see- PT recommending home with assist      Acute pain  -Pain team to see- signed off      Weakness  -Complex from MS and infection     Chronic anemia  -hx of Hiatal Hernia with Brian Lesions   EGD on 3/21/25 showed sizable hiatal hernia with Brian lesions with oozing.   -On ppi     RLS  -on ropinirole     Anxiety depression - continue PTA Wellbutrin and Celexa     Hyperlipidemia - continue PTA statin         Barriers to discharge: IV antibiotics, ID recommendations     Anticipated  length of stay: 2-3 days    Medically Ready for Discharge: Anticipated in 2-4 Days    Clinically Significant Risk Factors                   # Hypertension: Noted on problem list            # Morbid Obesity: Estimated body mass index is 46.76 kg/m  as calculated from the following:    Height as of this encounter: 1.524 m (5').    Weight as of this encounter: 108.6 kg (239 lb 6.7 oz)., PRESENT ON ADMISSION     # Financial/Environmental Concerns: none             Subjective:  Patient resting in bed.  Notes pain is under control.  No breathing issues.    PHYSICAL EXAM  Temp:  [97.8  F (36.6  C)-98.2  F (36.8  C)] 98.2  F (36.8  C)  Pulse:  [65-76] 65  Resp:  [16-18] 18  BP: (120-130)/(55-61) 120/55  SpO2:  [95 %-98 %] 95 %  Wt Readings from Last 1 Encounters:   07/08/25 108.6 kg (239 lb 6.7 oz)       Intake/Output Summary (Last 24 hours) at 7/10/2025 1347  Last data filed at 7/10/2025 1230  Gross per 24 hour   Intake 2005 ml   Output 1000 ml   Net 1005 ml      Body mass index is 46.76 kg/m .    GENRL: Alert and answering questions appropriately. Not in acute distress. Lying in bed   CHEST:  Breathing easily   EXTRM: Chronic lymphedema.  Right lower extremity with dressing in place extending from foot up to below the knee.  Left lower extremity with dressing to foot and ankle.  No edema noted.  Poorly palpated DP Pulse.  PSYCH: Normal affect and mood.   INTGM: Chronic skin changes related to lymphedema to lower extremities.     Medical Decision Making       35 MINUTES SPENT BY ME on the date of service doing chart review, history, exam, documentation & further activities per the note.      PERTINENT LABS/IMAGING:  Results for orders placed or performed during the hospital encounter of 07/07/25   US Lower Extremity Venous Duplex Bilateral    Impression    IMPRESSION:  1.  No deep venous thrombosis in the bilateral lower extremities.  2.  5.4 cm mildly complex collection in the left popliteal fossa, likely represents  "Baker's cyst.     XR Chest Port 1 View    Impression    IMPRESSION: Cardiomediastinal silhouette within normal limits. No focal consolidation or pleural effusion. Mild elevation right hemidiaphragm. Degenerative change osseous structures.   XR Chest Port 1 View    Impression    IMPRESSION:   1.  Patient rotation to the right accounts for altered mediastinal contours.  2.  Left PICC tip in lower SVC. No pneumothorax.  3.  Mild interstitial pulmonary edema without pleural effusion.  4.  Normal heart size.     Most Recent 3 CBC's:  Recent Labs   Lab Test 07/10/25  0635 07/09/25  0546 07/08/25  0714   WBC 6.2 8.0 11.5*   HGB 8.5* 8.7* 9.4*   MCV 83 83 81    261 311     Most Recent 3 BMP's:  Recent Labs   Lab Test 07/10/25  0635 07/09/25  0546 07/08/25  0714    139 141   POTASSIUM 3.6 4.1 4.0   CHLORIDE 105 105 106   CO2 28 26 27   BUN 21.8 24.7* 22.8   CR 0.97* 1.05* 0.88   ANIONGAP 6* 8 8   JUNI 8.7* 8.5* 8.7*   * 110* 91     Most Recent 2 LFT's:  Recent Labs   Lab Test 07/01/25  1439 05/16/25  1529   AST 16 18   ALT 19 17   ALKPHOS 131 118   BILITOTAL <0.2 0.2       Recent Labs   Lab Test 02/13/24  1143   CHOL 207*   HDL 69   *   TRIG 122     Recent Labs   Lab Test 02/13/24  1143 02/10/23  1535 11/10/21  1551   * 80 68     Recent Labs   Lab Test 07/10/25  0635      POTASSIUM 3.6   CHLORIDE 105   CO2 28   *   BUN 21.8   CR 0.97*   GFRESTIMATED 60*   JUNI 8.7*     Recent Labs   Lab Test 03/20/25  0640 04/04/22  0523   A1C 6.0* 5.4     Recent Labs   Lab Test 07/10/25  0635 07/09/25  0546 07/08/25  0714   HGB 8.5* 8.7* 9.4*     No results for input(s): \"TROPONINI\" in the last 96154 hours.  Recent Labs   Lab Test 07/07/25  2308 03/12/25  1600 01/12/25  1104 09/07/24  1347 07/19/24  0710   NTBNPI  --  520  --  605 774   NTBNP 125  --  224  --   --      Recent Labs   Lab Test 03/26/25  2054   TSH 5.42*     Recent Labs   Lab Test 03/12/25  1600   INR 1.11       Wanda Lipscomb, " DO  Hospitalist Service  [facility]

## 2025-07-11 ENCOUNTER — APPOINTMENT (OUTPATIENT)
Dept: PHYSICAL THERAPY | Facility: HOSPITAL | Age: 80
End: 2025-07-11
Payer: COMMERCIAL

## 2025-07-11 LAB
ATRIAL RATE - MUSE: 74 BPM
CREAT SERPL-MCNC: 0.9 MG/DL (ref 0.51–0.95)
DIASTOLIC BLOOD PRESSURE - MUSE: NORMAL MMHG
EGFRCR SERPLBLD CKD-EPI 2021: 66 ML/MIN/1.73M2
INTERPRETATION ECG - MUSE: NORMAL
MCV RBC AUTO: 83 FL (ref 78–100)
P AXIS - MUSE: 73 DEGREES
PLATELET # BLD AUTO: 285 10E3/UL (ref 150–450)
PR INTERVAL - MUSE: 162 MS
QRS DURATION - MUSE: 82 MS
QT - MUSE: 414 MS
QTC - MUSE: 459 MS
R AXIS - MUSE: -20 DEGREES
SYSTOLIC BLOOD PRESSURE - MUSE: NORMAL MMHG
T AXIS - MUSE: 9 DEGREES
VENTRICULAR RATE- MUSE: 74 BPM

## 2025-07-11 PROCEDURE — 82565 ASSAY OF CREATININE: CPT | Performed by: HOSPITALIST

## 2025-07-11 PROCEDURE — 250N000013 HC RX MED GY IP 250 OP 250 PS 637: Performed by: NURSE PRACTITIONER

## 2025-07-11 PROCEDURE — 250N000011 HC RX IP 250 OP 636: Performed by: HOSPITALIST

## 2025-07-11 PROCEDURE — 99232 SBSQ HOSP IP/OBS MODERATE 35: CPT | Performed by: INTERNAL MEDICINE

## 2025-07-11 PROCEDURE — 120N000001 HC R&B MED SURG/OB

## 2025-07-11 PROCEDURE — 258N000003 HC RX IP 258 OP 636: Performed by: HOSPITALIST

## 2025-07-11 PROCEDURE — 85049 AUTOMATED PLATELET COUNT: CPT | Performed by: HOSPITALIST

## 2025-07-11 PROCEDURE — 250N000013 HC RX MED GY IP 250 OP 250 PS 637: Performed by: HOSPITALIST

## 2025-07-11 PROCEDURE — 97116 GAIT TRAINING THERAPY: CPT | Mod: GP

## 2025-07-11 RX ADMIN — SPIRONOLACTONE 25 MG: 25 TABLET, FILM COATED ORAL at 22:06

## 2025-07-11 RX ADMIN — ACETAMINOPHEN 975 MG: 325 TABLET ORAL at 11:11

## 2025-07-11 RX ADMIN — ACETAMINOPHEN 975 MG: 325 TABLET ORAL at 00:31

## 2025-07-11 RX ADMIN — ENOXAPARIN SODIUM 40 MG: 40 INJECTION SUBCUTANEOUS at 08:30

## 2025-07-11 RX ADMIN — ACETAMINOPHEN 975 MG: 325 TABLET ORAL at 06:19

## 2025-07-11 RX ADMIN — Medication 250 MG: at 08:29

## 2025-07-11 RX ADMIN — CEFEPIME HYDROCHLORIDE 2 G: 2 INJECTION, POWDER, FOR SOLUTION INTRAVENOUS at 12:02

## 2025-07-11 RX ADMIN — HYDROMORPHONE HYDROCHLORIDE 2 MG: 2 TABLET ORAL at 01:49

## 2025-07-11 RX ADMIN — SENNOSIDES AND DOCUSATE SODIUM 1 TABLET: 50; 8.6 TABLET ORAL at 19:44

## 2025-07-11 RX ADMIN — BUPROPION HYDROCHLORIDE 150 MG: 150 TABLET, FILM COATED, EXTENDED RELEASE ORAL at 08:30

## 2025-07-11 RX ADMIN — SIMVASTATIN 40 MG: 10 TABLET, FILM COATED ORAL at 22:06

## 2025-07-11 RX ADMIN — HYDROMORPHONE HYDROCHLORIDE 2 MG: 2 TABLET ORAL at 16:29

## 2025-07-11 RX ADMIN — HYDROXYZINE HYDROCHLORIDE 10 MG: 10 TABLET ORAL at 01:49

## 2025-07-11 RX ADMIN — ENOXAPARIN SODIUM 40 MG: 40 INJECTION SUBCUTANEOUS at 19:42

## 2025-07-11 RX ADMIN — PANTOPRAZOLE SODIUM 40 MG: 20 TABLET, DELAYED RELEASE ORAL at 08:29

## 2025-07-11 RX ADMIN — FERROUS SULFATE TAB 325 MG (65 MG ELEMENTAL FE) 325 MG: 325 (65 FE) TAB at 08:29

## 2025-07-11 RX ADMIN — FUROSEMIDE 40 MG: 20 TABLET ORAL at 08:29

## 2025-07-11 RX ADMIN — ROPINIROLE HYDROCHLORIDE 0.5 MG: 0.25 TABLET, FILM COATED ORAL at 16:30

## 2025-07-11 RX ADMIN — CEFEPIME HYDROCHLORIDE 2 G: 2 INJECTION, POWDER, FOR SOLUTION INTRAVENOUS at 00:32

## 2025-07-11 RX ADMIN — HYDROMORPHONE HYDROCHLORIDE 2 MG: 2 TABLET ORAL at 11:11

## 2025-07-11 RX ADMIN — ROPINIROLE HYDROCHLORIDE 0.5 MG: 0.25 TABLET, FILM COATED ORAL at 08:30

## 2025-07-11 RX ADMIN — GABAPENTIN 300 MG: 300 CAPSULE ORAL at 19:44

## 2025-07-11 RX ADMIN — SENNOSIDES AND DOCUSATE SODIUM 1 TABLET: 50; 8.6 TABLET ORAL at 08:35

## 2025-07-11 RX ADMIN — Medication 1 TABLET: at 08:29

## 2025-07-11 RX ADMIN — ROPINIROLE HYDROCHLORIDE 1 MG: 1 TABLET, FILM COATED ORAL at 22:06

## 2025-07-11 RX ADMIN — GABAPENTIN 300 MG: 300 CAPSULE ORAL at 08:28

## 2025-07-11 RX ADMIN — Medication 400 UNITS: at 08:30

## 2025-07-11 RX ADMIN — ACETAMINOPHEN 975 MG: 325 TABLET ORAL at 18:42

## 2025-07-11 RX ADMIN — SODIUM CHLORIDE 1250 MG: 0.9 INJECTION, SOLUTION INTRAVENOUS at 08:35

## 2025-07-11 RX ADMIN — MICONAZOLE NITRATE ANTIFUNGAL POWDER: 2 POWDER TOPICAL at 11:11

## 2025-07-11 RX ADMIN — CITALOPRAM 40 MG: 40 TABLET ORAL at 08:30

## 2025-07-11 ASSESSMENT — ACTIVITIES OF DAILY LIVING (ADL)
ADLS_ACUITY_SCORE: 36
ADLS_ACUITY_SCORE: 39
ADLS_ACUITY_SCORE: 36
ADLS_ACUITY_SCORE: 36
ADLS_ACUITY_SCORE: 39
ADLS_ACUITY_SCORE: 36
ADLS_ACUITY_SCORE: 39
ADLS_ACUITY_SCORE: 39
ADLS_ACUITY_SCORE: 36
ADLS_ACUITY_SCORE: 36
ADLS_ACUITY_SCORE: 39
ADLS_ACUITY_SCORE: 36
ADLS_ACUITY_SCORE: 36
ADLS_ACUITY_SCORE: 39
ADLS_ACUITY_SCORE: 36
ADLS_ACUITY_SCORE: 36
ADLS_ACUITY_SCORE: 39

## 2025-07-11 NOTE — PROGRESS NOTES
Infectious Diseases Progress Note  [facility]    Date of visit: 07/11/2025     ASSESSMENT   79-year-old woman with chronic lymphedema, hyperlipidemia, diastolic dysfunction, hypertension, multiple sclerosis admitted with worsening right leg infection.    Chronic right leg wound secondary to lymphedema.  Worsening appearance noted by home nurse.  Swab culture growing 11 different organisms.  No signs of systemic infection.  Mild leukocytosis, now normal. No significant change in wounds today    Principal Problem:    Bilateral cellulitis of lower leg  Active Problems:    Acquired lymphedema of leg    Depression    GERD (gastroesophageal reflux disease)    Essential hypertension    Menopausal and postmenopausal disorder    Morbid obesity -- BMI 48.8    Weakness generalized    Cellulitis of right lower extremity       PLAN   -Difficult to interpret surface wound culture as this represents wound colonization and might not accurately reflect etiology of infection.  Identification of 11 organisms is more of a sign of the excellent work that our microbiology lab does.  - Empiric broad coverage with vancomycin and cefepime, planned 5 days, through 7/12. Then discharge with amoxicillin/clauvulanic acid x 10 days  -continue wound cares    I will sign-off at this time. Please do not hesitate to call if there are any further questions or if there is a change in the patient's clinical status.     Son Rodriguez MD  [facility]  Direct messaging: [employer] Paging  On-Call ID provider: [phone], option: 9      ===========================================      SUBJECTIVE / INTERVAL HISTORY:     No acute events. Seen during dressing changes.      Antibiotics   Vancomycin 7/8-  Cefepime 7/8-    Previous:  None       Physical Exam     Temp:  [97.7  F (36.5  C)-98.1  F (36.7  C)] 97.7  F (36.5  C)  Pulse:  [65-69] 67  Resp:  [18-20] 20  BP: (117-138)/(56-63) 125/62  SpO2:  [96 %-100 %] 98 %    BP  125/62 (BP Location: Right arm)   Pulse 67   Temp 97.7  F (36.5  C) (Oral)   Resp 20   Ht 1.524 m (5')   Wt 108.6 kg (239 lb 6.7 oz)   SpO2 98%   BMI 46.76 kg/m      GENERAL:  well-developed, well-nourished, lying in bed in no acute distress.   HENT:  Head is normocephalic, atraumatic.   EYES:  Eyes have anicteric sclerae without conjunctival injection   LUNGS:  normal respiratory pattern   EXT: Chronic lymphedema changes in the left lower leg. Circumferential wound on right leg, no purulence. Clean wound bases  SKIN:  No acute rashes.  Left arm PICC line is in place without any surrounding erythema.   NEUROLOGIC:  Grossly nonfocal.    Right lower leg 7/8:              Cultures   7/8 blood cultures x 2: no growth to date     Susceptibility data from last 90 days.  Collected Specimen Info Organism Ampicillin Ampicillin/Sulbactam Cefazolin Cefepime Ceftazidime Ceftriaxone Ciprofloxacin Clindamycin Daptomycin Doxycycline Erythromycin Gentamicin Gentamicin Synergy Levofloxacin   07/02/25 Wound from Leg, Right Proteus vulgaris  R  S  R  S  S  S  S      S   S     Citrobacter freundii complex R R   S R R  I      S   I     Pseudomonas aeruginosa     S  S   R        R     Klebsiella pneumoniae R  S   S  S  S  S      S   S     Enterobacter cloacae complex R R   S R R  S      S   S     Staphylococcus aureus        R  S  I  R  S       Enterococcus avium  S             S      Enterococcus faecalis  S             R      Stenotrophomonas maltophilia               S     Corynebacterium striatum                   Streptococcus constellatus                   Collected Specimen Info Organism Meropenem Minocycline Oxacillin Piperacillin/Tazobactam Tetracycline Trimethoprim/Sulfamethoxazole  Vancomycin   07/02/25 Wound from Leg, Right Proteus vulgaris  S    S   S      Citrobacter freundii complex  S   R   S      Pseudomonas aeruginosa  S    S        Klebsiella pneumoniae  S    S   S      Enterobacter cloacae complex  S   R   S       Staphylococcus aureus    S   R  R  S     Enterococcus avium        S     Enterococcus faecalis        S     Stenotrophomonas maltophilia   S     S      Corynebacterium striatum            Streptococcus constellatus                 Pertinent Labs:     Recent Labs   Lab 07/11/25  0623 07/10/25  0635 07/09/25  0546 07/08/25  0714   WBC  --  6.2 8.0 11.5*   HGB  --  8.5* 8.7* 9.4*    255 261 311       Recent Labs   Lab 07/10/25  0635 07/09/25  0546 07/08/25  0714    139 141   CO2 28 26 27   BUN 21.8 24.7* 22.8       Recent Labs   Lab 07/07/25  2308   CRPI 57.20*           Imaging:     XR Chest Port 1 View  Result Date: 7/8/2025  EXAM: XR CHEST PORT 1 VIEW LOCATION: [facility] DATE: 7/8/2025 INDICATION: PICC placement COMPARISON: None.     IMPRESSION: 1.  Patient rotation to the right accounts for altered mediastinal contours. 2.  Left PICC tip in lower SVC. No pneumothorax. 3.  Mild interstitial pulmonary edema without pleural effusion. 4.  Normal heart size.    US Lower Extremity Venous Duplex Bilateral  Result Date: 7/7/2025  EXAM: US LOWER EXTREMITY VENOUS DUPLEX BILATERAL LOCATION: [facility] DATE: 7/7/2025 INDICATION: Bilateral leg swelling and cellulitis. COMPARISON: Lower extremity venous on 3/12/2025. TECHNIQUE: Venous Duplex ultrasound of bilateral lower extremities with and without compression, augmentation and duplex. Color flow and spectral Doppler with waveform analysis performed. FINDINGS: Technically challenging exam due to patient's body habitus and presence of soft tissue edema. Within this limitation, there is: Exam includes the common femoral, femoral, popliteal veins as well as segmentally visualized deep calf veins and greater saphenous vein. RIGHT: No deep vein thrombosis, although the calf veins are not visualized due to presence of overlying wound dressing. No superficial thrombophlebitis. No popliteal cyst. LEFT: No deep vein  thrombosis. No superficial thrombophlebitis. There is 1.8 x 5.4 x 1.5 cm mildly complex collection in the popliteal fossa, likely represents Baker's cyst.     IMPRESSION: 1.  No deep venous thrombosis in the bilateral lower extremities. 2.  5.4 cm mildly complex collection in the left popliteal fossa, likely represents Baker's cyst.     XR Chest Port 1 View  Result Date: 7/7/2025  EXAM: XR CHEST PORT 1 VIEW LOCATION: [facility] DATE: 7/7/2025 INDICATION: Increased edema. COMPARISON: 01/12/2025.     IMPRESSION: Cardiomediastinal silhouette within normal limits. No focal consolidation or pleural effusion. Mild elevation right hemidiaphragm. Degenerative change osseous structures.          Data reviewed today: I reviewed all medications, new labs and imaging results over the last 24 hours. I personally reviewed no images or EKG's today.  The patient's care was discussed with the Bedside Nurse and Patient.

## 2025-07-11 NOTE — PROGRESS NOTES
[facility]    PROGRESS NOTE - Hospitalist Service    ASSESSMENT AND PLAN     Principal Problem:    Bilateral cellulitis of lower leg  Active Problems:    Acquired lymphedema of leg    Depression    GERD (gastroesophageal reflux disease)    Essential hypertension    Menopausal and postmenopausal disorder    Morbid obesity -- BMI 48.8    Weakness generalized    Cellulitis of right lower extremity    Elizabeth Kelley is a 79 year old female with a pertinent history of MS, anemia, edema, melanoma, lymphedema, chronic diastolic heart failure, sepsis, hypertensive HF, hypokalemia, osteoarthritis, CKD stage 3, GERD, hemorrhage, HTN, and malignant melanoma, who presents to this ED with bilateral cellulitis of lower extremities.       Bilateral cellulitis lower legs  - Patient notes acute on chronic infections for years.    - Swab culture results antibiotic resistance with Pseudomonas Enterococcus.   -PTA daily minocycline 50 mg for prevention  - ID recommending vancomycin and cefepime.  Plan for 5 days IV antibiotics and then switched to Augmentin.  IV antibiotics to complete 7/12.       Brief history    Admitted 1/12 - 1/18/2025 for RIGHT lower extremity cellulitis and sepsis, treated with meropenem and vancomycin, and she was discharged on doxycycline.    Hospitalized 3/12 - 3/25/25 for LEFT lower extremity cellulitis. She was treated with Vanco and Zosyn/ceftriaxone and discharged with Keflex.      Chronic lymphedema     MS  -walks with cane outside home  -in home no cane use  PT/OT to see- PT recommending home with assist      Acute pain  -Pain team to see- signed off      Weakness  -Complex from MS and infection     Chronic anemia  -hx of Hiatal Hernia with Brian Lesions   EGD on 3/21/25 showed sizable hiatal hernia with Brian lesions with oozing.   -On ppi     RLS  -on ropinirole     Anxiety depression - continue PTA Wellbutrin and Celexa     Hyperlipidemia - continue PTA statin          Barriers to discharge: IV antibiotics through 7/12     Anticipated length of stay: 2 more days-anticipate discharge 7/13    Medically Ready for Discharge: Anticipated in 2-4 Days    Clinically Significant Risk Factors                   # Hypertension: Noted on problem list            # Morbid Obesity: Estimated body mass index is 46.76 kg/m  as calculated from the following:    Height as of this encounter: 1.524 m (5').    Weight as of this encounter: 108.6 kg (239 lb 6.7 oz)., PRESENT ON ADMISSION     # Financial/Environmental Concerns: none         Subjective:  Patient resting in bed.  States she was able to sleep more comfortably last night with less pain.  No concerns or complaints.    PHYSICAL EXAM  Temp:  [97.7  F (36.5  C)-98.1  F (36.7  C)] 97.7  F (36.5  C)  Pulse:  [65-69] 67  Resp:  [18-20] 20  BP: (117-138)/(56-63) 125/62  SpO2:  [96 %-100 %] 98 %  Wt Readings from Last 1 Encounters:   07/08/25 108.6 kg (239 lb 6.7 oz)       Intake/Output Summary (Last 24 hours) at 7/11/2025 1135  Last data filed at 7/11/2025 0835  Gross per 24 hour   Intake 1240 ml   Output 550 ml   Net 690 ml      Body mass index is 46.76 kg/m .    GENRL: Alert and answering questions appropriately. Not in acute distress. Sitting in a chair  CHEST:  Breathing easily   EXTRM: Chronic lymphedema.  Right lower extremity with dressing in place extending from foot up to below the knee.  Left lower extremity with dressing to foot and ankle.  Edema noted.  PSYCH: Normal affect and mood.   INTGM: Chronic skin changes related to lymphedema to lower extremities.          Medical Decision Making       35 MINUTES SPENT BY ME on the date of service doing chart review, history, exam, documentation & further activities per the note.      PERTINENT LABS/IMAGING:  Results for orders placed or performed during the hospital encounter of 07/07/25   US Lower Extremity Venous Duplex Bilateral    Impression    IMPRESSION:  1.  No deep venous thrombosis in  "the bilateral lower extremities.  2.  5.4 cm mildly complex collection in the left popliteal fossa, likely represents Baker's cyst.     XR Chest Port 1 View    Impression    IMPRESSION: Cardiomediastinal silhouette within normal limits. No focal consolidation or pleural effusion. Mild elevation right hemidiaphragm. Degenerative change osseous structures.   XR Chest Port 1 View    Impression    IMPRESSION:   1.  Patient rotation to the right accounts for altered mediastinal contours.  2.  Left PICC tip in lower SVC. No pneumothorax.  3.  Mild interstitial pulmonary edema without pleural effusion.  4.  Normal heart size.     Most Recent 3 CBC's:  Recent Labs   Lab Test 07/11/25  0623 07/10/25  0635 07/09/25  0546 07/08/25  0714   WBC  --  6.2 8.0 11.5*   HGB  --  8.5* 8.7* 9.4*   MCV 83 83 83 81    255 261 311     Most Recent 3 BMP's:  Recent Labs   Lab Test 07/11/25  0623 07/10/25  0635 07/09/25  0546 07/08/25  0714   NA  --  139 139 141   POTASSIUM  --  3.6 4.1 4.0   CHLORIDE  --  105 105 106   CO2  --  28 26 27   BUN  --  21.8 24.7* 22.8   CR 0.90 0.97* 1.05* 0.88   ANIONGAP  --  6* 8 8   JUNI  --  8.7* 8.5* 8.7*   GLC  --  100* 110* 91     Most Recent 2 LFT's:  Recent Labs   Lab Test 07/01/25  1439 05/16/25  1529   AST 16 18   ALT 19 17   ALKPHOS 131 118   BILITOTAL <0.2 0.2       Recent Labs   Lab Test 02/13/24  1143   CHOL 207*   HDL 69   *   TRIG 122     Recent Labs   Lab Test 02/13/24  1143 02/10/23  1535 11/10/21  1551   * 80 68     Recent Labs   Lab Test 07/11/25  0623 07/10/25  0635   NA  --  139   POTASSIUM  --  3.6   CHLORIDE  --  105   CO2  --  28   GLC  --  100*   BUN  --  21.8   CR 0.90 0.97*   GFRESTIMATED 66 60*   JUNI  --  8.7*     Recent Labs   Lab Test 03/20/25  0640 04/04/22  0523   A1C 6.0* 5.4     Recent Labs   Lab Test 07/10/25  0635 07/09/25  0546 07/08/25  0714   HGB 8.5* 8.7* 9.4*     No results for input(s): \"TROPONINI\" in the last 60274 hours.  Recent Labs   Lab Test " 07/07/25  2308 03/12/25  1600 01/12/25  1104 09/07/24  1347 07/19/24  0710   NTBNPI  --  520  --  605 774   NTBNP 125  --  224  --   --      Recent Labs   Lab Test 03/26/25  2054   TSH 5.42*     Recent Labs   Lab Test 03/12/25  1600   INR 1.11       Wanda Lipscomb DO  Hospitalist Service  [facility]

## 2025-07-11 NOTE — PLAN OF CARE
Problem: Adult Inpatient Plan of Care  Goal: Plan of Care Review  Description: The Plan of Care Review/Shift note should be completed every shift.  The Outcome Evaluation is a brief statement about your assessment that the patient is improving, declining, or no change.  This information will be displayed automatically on your shift  note.  Outcome: Progressing     Problem: Pain Acute  Goal: Optimal Pain Control and Function  Outcome: Progressing  Intervention: Prevent or Manage Pain  Recent Flowsheet Documentation  Taken 7/11/2025 0000 by Panchito Hall RN  Sensory Stimulation Regulation: lighting decreased  Medication Review/Management: medications reviewed   Goal Outcome Evaluation:       Schedule Tylenol and PRN Dilaudid given for pain. PICC, patent and flushing well. Purewick in place.

## 2025-07-11 NOTE — PLAN OF CARE
Problem: Adult Inpatient Plan of Care  Goal: Optimal Comfort and Wellbeing  Intervention: Monitor Pain and Promote Comfort  Recent Flowsheet Documentation  Taken 7/10/2025 2207 by Nany Mcgill RN  Pain Management Interventions: medication (see MAR)  Taken 7/10/2025 2158 by Nany Mcgill RN  Pain Management Interventions: medication (see MAR)  Taken 7/10/2025 1726 by Nany Mcgill RN  Pain Management Interventions: medication (see MAR)     Problem: Delirium  Goal: Improved Behavioral Control  Outcome: Progressing  Intervention: Minimize Safety Risk  Recent Flowsheet Documentation  Taken 7/10/2025 1600 by Nany Mcgill RN  Enhanced Safety Measures:   pain management   assistive devices when indicated  Trust Relationship/Rapport:   care explained   emotional support provided   empathic listening provided   questions answered   questions encouraged   reassurance provided     Problem: Pain Acute  Goal: Optimal Pain Control and Function  Outcome: Progressing  Intervention: Develop Pain Management Plan  Recent Flowsheet Documentation  Taken 7/10/2025 2207 by Nany Mcgill RN  Pain Management Interventions: medication (see MAR)  Taken 7/10/2025 2158 by Nany Mcgill RN  Pain Management Interventions: medication (see MAR)  Taken 7/10/2025 1726 by Nany Mcgill RN  Pain Management Interventions: medication (see MAR)  Intervention: Prevent or Manage Pain  Recent Flowsheet Documentation  Taken 7/10/2025 1600 by Nany Mcgill RN  Sensory Stimulation Regulation: care clustered  Medication Review/Management: medications reviewed  Pt has been pleasant, alert and coherent.   Pain managed with PRN dilaudid and scheduled TY.  Dressing intact.

## 2025-07-11 NOTE — PLAN OF CARE
Problem: Adult Inpatient Plan of Care  Goal: Plan of Care Review  Description: The Plan of Care Review/Shift note should be completed every shift.  The Outcome Evaluation is a brief statement about your assessment that the patient is improving, declining, or no change.  This information will be displayed automatically on your shift  note.  Outcome: Progressing   Goal Outcome Evaluation:       Pt received two IV antibiotics via PICC line. Medicated with oral dilaudid before dressing changes. Right leg had a large amount of serous drainage, left leg small amounts of drainage. Tolerating a regular diet. Up in the bedside chair with stand by assist.

## 2025-07-12 LAB
CREAT SERPL-MCNC: 0.87 MG/DL (ref 0.51–0.95)
EGFRCR SERPLBLD CKD-EPI 2021: 68 ML/MIN/1.73M2

## 2025-07-12 PROCEDURE — 120N000001 HC R&B MED SURG/OB

## 2025-07-12 PROCEDURE — 99232 SBSQ HOSP IP/OBS MODERATE 35: CPT | Performed by: INTERNAL MEDICINE

## 2025-07-12 PROCEDURE — 250N000013 HC RX MED GY IP 250 OP 250 PS 637: Performed by: NURSE PRACTITIONER

## 2025-07-12 PROCEDURE — 250N000011 HC RX IP 250 OP 636: Performed by: HOSPITALIST

## 2025-07-12 PROCEDURE — 82565 ASSAY OF CREATININE: CPT | Performed by: HOSPITALIST

## 2025-07-12 PROCEDURE — 250N000011 HC RX IP 250 OP 636: Performed by: INTERNAL MEDICINE

## 2025-07-12 PROCEDURE — 250N000013 HC RX MED GY IP 250 OP 250 PS 637: Performed by: HOSPITALIST

## 2025-07-12 PROCEDURE — 258N000003 HC RX IP 258 OP 636: Performed by: HOSPITALIST

## 2025-07-12 RX ORDER — CEFEPIME HYDROCHLORIDE 2 G/1
2 INJECTION, POWDER, FOR SOLUTION INTRAVENOUS EVERY 12 HOURS
Status: COMPLETED | OUTPATIENT
Start: 2025-07-12 | End: 2025-07-12

## 2025-07-12 RX ADMIN — GABAPENTIN 300 MG: 300 CAPSULE ORAL at 08:21

## 2025-07-12 RX ADMIN — SPIRONOLACTONE 25 MG: 25 TABLET, FILM COATED ORAL at 21:46

## 2025-07-12 RX ADMIN — ZINC SULFATE 220 MG (50 MG) CAPSULE 220 MG: CAPSULE at 08:23

## 2025-07-12 RX ADMIN — ACETAMINOPHEN 975 MG: 325 TABLET ORAL at 16:58

## 2025-07-12 RX ADMIN — Medication 250 MG: at 08:22

## 2025-07-12 RX ADMIN — CEFEPIME HYDROCHLORIDE 2 G: 2 INJECTION, POWDER, FOR SOLUTION INTRAVENOUS at 21:47

## 2025-07-12 RX ADMIN — ACETAMINOPHEN 975 MG: 325 TABLET ORAL at 11:38

## 2025-07-12 RX ADMIN — ROPINIROLE HYDROCHLORIDE 1 MG: 1 TABLET, FILM COATED ORAL at 21:45

## 2025-07-12 RX ADMIN — Medication 400 UNITS: at 08:22

## 2025-07-12 RX ADMIN — HYDROMORPHONE HYDROCHLORIDE 2 MG: 2 TABLET ORAL at 09:53

## 2025-07-12 RX ADMIN — ENOXAPARIN SODIUM 40 MG: 40 INJECTION SUBCUTANEOUS at 19:26

## 2025-07-12 RX ADMIN — ROPINIROLE HYDROCHLORIDE 0.5 MG: 0.25 TABLET, FILM COATED ORAL at 16:58

## 2025-07-12 RX ADMIN — BUPROPION HYDROCHLORIDE 150 MG: 150 TABLET, FILM COATED, EXTENDED RELEASE ORAL at 08:22

## 2025-07-12 RX ADMIN — DOCUSATE SODIUM 50 MG AND SENNOSIDES 8.6 MG 2 TABLET: 8.6; 5 TABLET, FILM COATED ORAL at 08:31

## 2025-07-12 RX ADMIN — SODIUM CHLORIDE 1250 MG: 0.9 INJECTION, SOLUTION INTRAVENOUS at 08:21

## 2025-07-12 RX ADMIN — ENOXAPARIN SODIUM 40 MG: 40 INJECTION SUBCUTANEOUS at 08:22

## 2025-07-12 RX ADMIN — ROPINIROLE HYDROCHLORIDE 0.5 MG: 0.25 TABLET, FILM COATED ORAL at 08:23

## 2025-07-12 RX ADMIN — MICONAZOLE NITRATE ANTIFUNGAL POWDER: 2 POWDER TOPICAL at 08:21

## 2025-07-12 RX ADMIN — SIMVASTATIN 40 MG: 10 TABLET, FILM COATED ORAL at 21:46

## 2025-07-12 RX ADMIN — CEFEPIME HYDROCHLORIDE 2 G: 2 INJECTION, POWDER, FOR SOLUTION INTRAVENOUS at 00:22

## 2025-07-12 RX ADMIN — FERROUS SULFATE TAB 325 MG (65 MG ELEMENTAL FE) 325 MG: 325 (65 FE) TAB at 08:22

## 2025-07-12 RX ADMIN — Medication 1 TABLET: at 08:22

## 2025-07-12 RX ADMIN — CITALOPRAM 40 MG: 40 TABLET ORAL at 08:23

## 2025-07-12 RX ADMIN — CEFEPIME HYDROCHLORIDE 2 G: 2 INJECTION, POWDER, FOR SOLUTION INTRAVENOUS at 11:38

## 2025-07-12 RX ADMIN — PANTOPRAZOLE SODIUM 40 MG: 20 TABLET, DELAYED RELEASE ORAL at 08:22

## 2025-07-12 RX ADMIN — ACETAMINOPHEN 975 MG: 325 TABLET ORAL at 23:55

## 2025-07-12 RX ADMIN — B-COMPLEX W/ C & FOLIC ACID TAB 1 TABLET: TAB at 08:23

## 2025-07-12 RX ADMIN — HYDROMORPHONE HYDROCHLORIDE 2 MG: 2 TABLET ORAL at 21:45

## 2025-07-12 RX ADMIN — HYDROMORPHONE HYDROCHLORIDE 2 MG: 2 TABLET ORAL at 16:08

## 2025-07-12 RX ADMIN — FUROSEMIDE 40 MG: 20 TABLET ORAL at 08:21

## 2025-07-12 RX ADMIN — ACETAMINOPHEN 975 MG: 325 TABLET ORAL at 00:21

## 2025-07-12 RX ADMIN — MAGNESIUM OXIDE TAB 400 MG (241.3 MG ELEMENTAL MG) 400 MG: 400 (241.3 MG) TAB at 08:22

## 2025-07-12 RX ADMIN — ACETAMINOPHEN 975 MG: 325 TABLET ORAL at 06:10

## 2025-07-12 RX ADMIN — GABAPENTIN 300 MG: 300 CAPSULE ORAL at 19:26

## 2025-07-12 ASSESSMENT — ACTIVITIES OF DAILY LIVING (ADL)
ADLS_ACUITY_SCORE: 36
ADLS_ACUITY_SCORE: 39
ADLS_ACUITY_SCORE: 36
ADLS_ACUITY_SCORE: 39
ADLS_ACUITY_SCORE: 39
ADLS_ACUITY_SCORE: 36
ADLS_ACUITY_SCORE: 39
ADLS_ACUITY_SCORE: 39
ADLS_ACUITY_SCORE: 36
ADLS_ACUITY_SCORE: 39
ADLS_ACUITY_SCORE: 36
ADLS_ACUITY_SCORE: 39
ADLS_ACUITY_SCORE: 39
ADLS_ACUITY_SCORE: 36
ADLS_ACUITY_SCORE: 39
ADLS_ACUITY_SCORE: 39
ADLS_ACUITY_SCORE: 36

## 2025-07-12 NOTE — PROGRESS NOTES
Care Management Follow Up    Length of Stay (days): 4    Expected Discharge Date: 07/13/2025    Anticipated Discharge Plan:  Home Care    Transportation: Anticipate Family/friend    PT Recommendations: home with assist, Per plan established by the PT  OT Recommendations:  home with assist     Barriers to Discharge: IV abx    Prior Living Situation: house with significant other    Discussed  Partnership in Safe Discharge Planning  document with patient/family: No     Handoff Completed: No, handoff not indicated or clinically appropriate    Patient/Spokesperson Updated: No    Additional Information:  RNCM chart review, per Attending and ID plan today 7/12: continue vanco+Cefepime for today and start on oral Augmentin on 7/13, and discharge. Therapy recommending home with assist. Patient SBA and lives at home with spouse. Patient open to Home Care with [facility] for RN.     Next Steps: RNCM to follow for medical progression and follow up with [facility] for acceptance, final discharge plan.    Cindi Reynolds RN  Acute Care Management  [facility]  For further questions/concerns you can reach us at Vocera Web Console

## 2025-07-12 NOTE — PLAN OF CARE
Problem: Adult Inpatient Plan of Care  Goal: Optimal Comfort and Wellbeing  Intervention: Monitor Pain and Promote Comfort  Recent Flowsheet Documentation  Taken 7/11/2025 1944 by Yadi Lau RN  Pain Management Interventions: medication (see MAR)   Goal Outcome Evaluation:       Pt reports 5/10 bilateral leg pain. Scheduled meds given. Sleeping most of night. Purewick on overnight. Legs wrapped, and elevated on 2 pillows.

## 2025-07-12 NOTE — PROGRESS NOTES
[facility]    Medicine Progress Note - Hospitalist Service    Date of Admission:  7/7/2025    Assessment & Plan   Elizabeth Kelley is a 79 year old female with a pertinent history of MS, anemia, edema, melanoma, lymphedema, chronic diastolic heart failure, sepsis, hypertensive HF, hypokalemia, osteoarthritis, CKD stage 3, GERD, hemorrhage, HTN, and malignant melanoma, who presents to this ED with bilateral cellulitis of lower extremities.       Bilateral cellulitis lower legs  - Patient notes acute on chronic infections for years.    - Swab culture results antibiotic resistance with Pseudomonas Enterococcus.   -PTA daily minocycline 50 mg for prevention  - ID: Empiric broad coverage with vancomycin and cefepime, planned 5 days, through 7/12.  Then discharge with amoxicillin/clauvulanic acid x 10 days.  -7/12: continue vanco+Cefepime for today and start on Augmentin on 7/13, and discharge     Brief history    Admitted 1/12 - 1/18/2025 for RIGHT lower extremity cellulitis and sepsis, treated with meropenem and vancomycin, and she was discharged on doxycycline.    Hospitalized 3/12 - 3/25/25 for LEFT lower extremity cellulitis. She was treated with Vanco and Zosyn/ceftriaxone and discharged with Keflex.      Chronic lymphedema     MS  -walks with cane outside home  -in home no cane use  PT/OT to see- PT recommending home with assist      Acute pain  -Pain team to see- signed off      Weakness  -Complex from MS and infection     Chronic anemia  -hx of Hiatal Hernia with Brian Lesions   EGD on 3/21/25 showed sizable hiatal hernia with Brian lesions with oozing.   -On ppi     RLS  -on ropinirole     Anxiety depression - continue PTA Wellbutrin and Celexa     Hyperlipidemia - continue PTA statin                 Diet: Combination Diet Regular Diet Adult    DVT Prophylaxis: Enoxaparin (Lovenox) SQ  Amato Catheter: Not present  Lines: PRESENT      PICC 07/07/25 Single Lumen Left Basilic-Site  Assessment: WDL      Cardiac Monitoring: None  Code Status: Full Code      Clinically Significant Risk Factors                   # Hypertension: Noted on problem list            # Morbid Obesity: Estimated body mass index is 46.76 kg/m  as calculated from the following:    Height as of this encounter: 1.524 m (5').    Weight as of this encounter: 108.6 kg (239 lb 6.7 oz).      # Financial/Environmental Concerns: none         Social Drivers of Health    Tobacco Use: Medium Risk (7/7/2025)    Patient History     Smoking Tobacco Use: Former     Smokeless Tobacco Use: Never          Disposition Plan     Medically Ready for Discharge: Anticipated Tomorrow    Barriers to discharge: IV antibiotics through 7/12     Anticipated length of stay: anticipate discharge 7/13              Elma Stephen MD  Hospitalist Service  [facility]  Securely message with [employer] book (more info)  Text page via [employer] Paging/Directory   ______________________________________________________________________    Interval History   No acute event overnight, no new complaints; no cp/sob, no f/c    Physical Exam   Vital Signs: Temp: 98.3  F (36.8  C) Temp src: Oral BP: 132/61 Pulse: 66   Resp: 16 SpO2: 92 % O2 Device: None (Room air)    Weight: 239 lbs 6.71 oz    General.  Awake alert oriented not in acute distress.  HEENT.  Pupils equal round react to light, anicteric, EOM intact.  Neck supple no JVD.  CVS regular rhythm no murmur gallops.  Lungs.  Clear to auscultation bilateral no wheezing or rales.  Abdomen.  Soft nontender bowel sounds present.  Extremities.  Both legs in dressing.  Neurological.  Awake and alert. No focal deficit.  Skin no rash. No pallor.  Psych. Normal mood.      Medical Decision Making       46 MINUTES SPENT BY ME on the date of service doing chart review, history, exam, documentation & further activities per the note.      Data     I have personally reviewed the following data over the past 24  hrs:    N/A  \   N/A   / N/A     N/A N/A N/A /  N/A   N/A N/A 0.87 \       Imaging results reviewed over the past 24 hrs:   No results found for this or any previous visit (from the past 24 hours).

## 2025-07-12 NOTE — PLAN OF CARE
Problem: Adult Inpatient Plan of Care  Goal: Absence of Hospital-Acquired Illness or Injury  Intervention: Identify and Manage Fall Risk  Recent Flowsheet Documentation  Taken 7/12/2025 0810 by Mateusz Lincoln RN  Safety Promotion/Fall Prevention:   activity supervised   clutter free environment maintained   mobility aid in reach   nonskid shoes/slippers when out of bed   room near nurse's station   room organization consistent   safety round/check completed   supervised activity     Problem: Adult Inpatient Plan of Care  Goal: Absence of Hospital-Acquired Illness or Injury  Intervention: Prevent Skin Injury  Recent Flowsheet Documentation  Taken 7/12/2025 0810 by Mateusz Lincoln RN  Body Position:   position changed independently   supine, legs elevated     Problem: Adult Inpatient Plan of Care  Goal: Absence of Hospital-Acquired Illness or Injury  Intervention: Prevent Infection  Recent Flowsheet Documentation  Taken 7/12/2025 0810 by Mateusz Lincoln RN  Infection Prevention:   hand hygiene promoted   rest/sleep promoted   single patient room provided     Problem: Pain Acute  Goal: Optimal Pain Control and Function  Outcome: Progressing  Intervention: Prevent or Manage Pain  Recent Flowsheet Documentation  Taken 7/12/2025 0810 by Mateusz Lincoln RN  Sensory Stimulation Regulation: quiet environment promoted  Sleep/Rest Enhancement: regular sleep/rest pattern promoted  Medication Review/Management: medications reviewed   Goal Outcome Evaluation:       Patient up to chair today with minimal assist of 1 makes needs known appropriately, denies pain at this time, dressings to bilateral lower extremity dry and intact. Appetite is good, continues on IV antibiotics, afebrile, VSS.    Mateusz Lincoln RN

## 2025-07-12 NOTE — PLAN OF CARE
Problem: Pain Acute  Goal: Optimal Pain Control and Function  Outcome: Progressing  Intervention: Develop Pain Management Plan  Recent Flowsheet Documentation  Taken 7/12/2025 1658 by Kevin Maria, RN  Pain Management Interventions: medication (see MAR)  Intervention: Prevent or Manage Pain  Recent Flowsheet Documentation  Taken 7/12/2025 1700 by Kevin Maria, RN  Medication Review/Management: medications reviewed   Goal Outcome Evaluation:    Pt needed Zheng LE wound dressing change.  Gave prn Dilaudid 2 mg before dressing change.  Pt tolerated well.  Pt stated that she might be discharge to home tomorrow.  Will need wound dressing change before discharge.  Ordered one box Mextra and one box of Polymem (in pt's room).

## 2025-07-13 VITALS
RESPIRATION RATE: 18 BRPM | BODY MASS INDEX: 47 KG/M2 | TEMPERATURE: 98 F | OXYGEN SATURATION: 98 % | WEIGHT: 239.42 LBS | DIASTOLIC BLOOD PRESSURE: 56 MMHG | HEART RATE: 60 BPM | HEIGHT: 60 IN | SYSTOLIC BLOOD PRESSURE: 123 MMHG

## 2025-07-13 LAB
BACTERIA SPEC CULT: NO GROWTH
BACTERIA SPEC CULT: NO GROWTH
CREAT SERPL-MCNC: 0.87 MG/DL (ref 0.51–0.95)
EGFRCR SERPLBLD CKD-EPI 2021: 68 ML/MIN/1.73M2

## 2025-07-13 PROCEDURE — 250N000011 HC RX IP 250 OP 636: Performed by: HOSPITALIST

## 2025-07-13 PROCEDURE — 82565 ASSAY OF CREATININE: CPT | Performed by: HOSPITALIST

## 2025-07-13 PROCEDURE — 250N000013 HC RX MED GY IP 250 OP 250 PS 637: Performed by: INTERNAL MEDICINE

## 2025-07-13 PROCEDURE — 99239 HOSP IP/OBS DSCHRG MGMT >30: CPT | Performed by: INTERNAL MEDICINE

## 2025-07-13 PROCEDURE — 250N000013 HC RX MED GY IP 250 OP 250 PS 637: Performed by: HOSPITALIST

## 2025-07-13 PROCEDURE — 250N000013 HC RX MED GY IP 250 OP 250 PS 637: Performed by: NURSE PRACTITIONER

## 2025-07-13 RX ADMIN — Medication 1 MG: at 01:00

## 2025-07-13 RX ADMIN — ACETAMINOPHEN 975 MG: 325 TABLET ORAL at 05:43

## 2025-07-13 RX ADMIN — Medication 250 MG: at 08:47

## 2025-07-13 RX ADMIN — FERROUS SULFATE TAB 325 MG (65 MG ELEMENTAL FE) 325 MG: 325 (65 FE) TAB at 07:58

## 2025-07-13 RX ADMIN — ENOXAPARIN SODIUM 40 MG: 40 INJECTION SUBCUTANEOUS at 08:05

## 2025-07-13 RX ADMIN — GABAPENTIN 300 MG: 300 CAPSULE ORAL at 07:58

## 2025-07-13 RX ADMIN — AMOXICILLIN AND CLAVULANATE POTASSIUM 1 TABLET: 875; 125 TABLET, FILM COATED ORAL at 07:58

## 2025-07-13 RX ADMIN — ROPINIROLE HYDROCHLORIDE 0.5 MG: 0.25 TABLET, FILM COATED ORAL at 07:59

## 2025-07-13 RX ADMIN — HYDROMORPHONE HYDROCHLORIDE 2 MG: 2 TABLET ORAL at 12:12

## 2025-07-13 RX ADMIN — ACETAMINOPHEN 975 MG: 325 TABLET ORAL at 12:11

## 2025-07-13 RX ADMIN — FUROSEMIDE 40 MG: 20 TABLET ORAL at 07:58

## 2025-07-13 RX ADMIN — PANTOPRAZOLE SODIUM 40 MG: 20 TABLET, DELAYED RELEASE ORAL at 07:58

## 2025-07-13 RX ADMIN — BUPROPION HYDROCHLORIDE 150 MG: 150 TABLET, FILM COATED, EXTENDED RELEASE ORAL at 07:58

## 2025-07-13 RX ADMIN — Medication 400 UNITS: at 07:59

## 2025-07-13 RX ADMIN — CITALOPRAM 40 MG: 40 TABLET ORAL at 07:59

## 2025-07-13 RX ADMIN — Medication 1 TABLET: at 07:58

## 2025-07-13 RX ADMIN — SENNOSIDES AND DOCUSATE SODIUM 1 TABLET: 50; 8.6 TABLET ORAL at 07:59

## 2025-07-13 ASSESSMENT — ACTIVITIES OF DAILY LIVING (ADL)
ADLS_ACUITY_SCORE: 41
ADLS_ACUITY_SCORE: 36
ADLS_ACUITY_SCORE: 36
ADLS_ACUITY_SCORE: 41
ADLS_ACUITY_SCORE: 36
ADLS_ACUITY_SCORE: 36
ADLS_ACUITY_SCORE: 41
ADLS_ACUITY_SCORE: 36
ADLS_ACUITY_SCORE: 41
ADLS_ACUITY_SCORE: 36
ADLS_ACUITY_SCORE: 36
ADLS_ACUITY_SCORE: 41
ADLS_ACUITY_SCORE: 36
ADLS_ACUITY_SCORE: 41
ADLS_ACUITY_SCORE: 41

## 2025-07-13 NOTE — PROGRESS NOTES
Chart reviewed. Referral for home health care with [facility] is pending.     Plan is for patient to return home where she lives with her spouse.     CM to follow.    ROLLY Gaviria,  DANETTESW

## 2025-07-13 NOTE — PLAN OF CARE
Problem: Adult Inpatient Plan of Care  Goal: Optimal Comfort and Wellbeing  Outcome: Progressing  Intervention: Monitor Pain and Promote Comfort  Recent Flowsheet Documentation  Taken 7/12/2025 2355 by Yadi Lau RN  Pain Management Interventions: medication (see MAR)  Intervention: Provide Person-Centered Care  Recent Flowsheet Documentation  Taken 7/13/2025 0000 by Yadi Lau, RN  Trust Relationship/Rapport:   care explained   choices provided   Goal Outcome Evaluation:       Pt complains of 7/10 leg pain, scheduled tylenol given. Purewick on overnight. Awakenings minimized, melatonin given. Standby assist with walker.

## 2025-07-13 NOTE — DISCHARGE SUMMARY
[facility]  Hospitalist Discharge Summary      Date of Admission:  7/7/2025  Date of Discharge:  7/13/2025  Discharging Provider: Jaren Paris DO  Discharge Service: Hospitalist Service    Discharge Diagnoses   Bilateral cellulitis lower legs  Chronic lymphedema   MS  Acute pain  Weakness  Chronic anemia  RLS  Anxiety depression  Hyperlipidemia     Follow-ups Needed After Discharge   Follow-up with PCP for posthospital evaluation      Discharge Disposition   Discharged to home  Condition at discharge: Stable    Hospital Course   Elizabeth Kelley is a 79 year old female with a pertinent history of MS, anemia, edema, melanoma, lymphedema, chronic diastolic heart failure, sepsis, hypertensive HF, hypokalemia, osteoarthritis, CKD stage 3, GERD, hemorrhage, HTN, and malignant melanoma, who was admitted to [facility] from 7/7/2025 - 7/13/2025 for bilateral cellulitis of lower extremities with polymicrobial growth of 11 different organisms.  Difficult to delineate if these organisms are a true pathogen versus skin colonization.  ID brought on board who recommended empiric vancomycin/cefepime IV x 5 days (until 7/12/2025) followed by transition to amoxicillin-clavulanate 875-125 mg twice daily for an additional 10 days (end date 7/22/2025).  On day of discharge, she was tolerating her meals and medications without adverse effects.  All questions answered by me.  Stable for discharge home.    Consultations This Hospital Stay   PHARMACY TO DOSE VANCO  VASCULAR ACCESS ADULT IP CONSULT  PHARMACY TO DOSE VANCO  INFECTIOUS DISEASES IP CONSULT  CARE MANAGEMENT / SOCIAL WORK IP CONSULT  PAIN MANAGEMENT ADULT IP CONSULT  WOUND OSTOMY CONTINENCE NURSE  IP CONSULT  PHYSICAL THERAPY ADULT IP CONSULT  OCCUPATIONAL THERAPY ADULT IP CONSULT  PHARMACY TO DOSE VANCO    Code Status   Full Code    Time Spent on this Encounter   IJaren DO, personally saw the patient today and spent greater than  30 minutes discharging this patient.       Jaren Paris   [address] [place] [id number] [facility] [address]  Phone: [phone]  Fax: [phone]  ______________________________________________________________________    Physical Exam   Vital Signs: Temp: 98  F (36.7  C) Temp src: Oral BP: 123/56 Pulse: 60   Resp: 18 SpO2: 98 % O2 Device: None (Room air)    Weight: 239 lbs 6.71 oz  General.  Awake alert oriented not in acute distress.  HEENT.  Pupils equal round react to light, anicteric, EOM intact.  Neck supple no JVD.  CVS regular rhythm no murmur gallops.  Lungs.  Clear to auscultation bilateral no wheezing or rales.  Abdomen.  Soft nontender bowel sounds present.  Extremities.  Both legs in dressing.  Neurological.  Awake and alert. No focal deficit.  Skin no rash. No pallor.  Psych. Normal mood.       Primary Care Physician   Yung Herrmann    Discharge Orders      Reason for your hospital stay    BLE wounds     Activity    Your activity upon discharge: activity as tolerated     Full Code     Diet    Follow this diet upon discharge: Current Diet:Orders Placed This Encounter      Combination Diet Regular Diet Adult     Hospital Follow-up with Existing Primary Care Provider (PCP)            Significant Results and Procedures     Discharge Medications      Review of your medicines        START taking        Dose / Directions   amoxicillin-clavulanate 875-125 MG tablet  Commonly known as: AUGMENTIN  Indication: Infection of the Skin and/or Soft Tissue  Used for: Cellulitis of left lower extremity      Dose: 1 tablet  Take 1 tablet by mouth every 12 hours for 10 days.  Quantity: 20 tablet  Refills: 0            CONTINUE these medicines which have NOT CHANGED        Dose / Directions   acetaminophen 500 MG tablet  Commonly known as: TYLENOL      Dose: 1,000 mg  Take 1,000 mg by mouth every 6 hours as needed for pain.  Refills: 0     ascorbic acid 250 MG Chew chewable  tablet  Commonly known as: vitamin C      Dose: 250 mg  Take 250 mg by mouth daily.  Refills: 0     buPROPion 150 MG 24 hr tablet  Commonly known as: WELLBUTRIN XL      Dose: 150 mg  Take 150 mg by mouth every morning.  Refills: 0     citalopram 40 MG tablet  Commonly known as: celeXA      Dose: 40 mg  Take 40 mg by mouth daily (with lunch)  Refills: 0     diclofenac 1 % topical gel  Commonly known as: VOLTAREN      Dose: 2 g  Apply 2 g topically 4 times daily as needed for moderate pain.  Refills: 0     ferrous sulfate 325 (65 Fe) MG tablet  Commonly known as: FEROSUL      Dose: 325 mg  Take 325 mg by mouth three times a week as needed. Monday, Wednesday and Friday  Refills: 0     furosemide 20 MG tablet  Commonly known as: LASIX      Dose: 40 mg  Take 40 mg by mouth daily.  Refills: 0     gabapentin 100 MG capsule  Commonly known as: NEURONTIN      Dose: 100 mg  Take 100 mg by mouth 3 times daily  Refills: 0     magnesium oxide 400 MG tablet  Commonly known as: MAG-OX      Dose: 1 tablet  Take 1 tablet by mouth three times a week. Tuesday, Thursday and Saturday  Refills: 0     minocycline 50 MG tablet  Commonly known as: DYNACIN  Used for: Cellulitis of right lower leg [L03.115]      Dose: 50 mg  Take 1 tablet (50 mg) by mouth daily.  Refills: 0     multivitamin w/minerals tablet      Dose: 1 tablet  Take 1 tablet by mouth every morning  Refills: 0     nitroGLYcerin 0.4 MG sublingual tablet  Commonly known as: NITROSTAT      Dose: 0.4 mg  Place 0.4 mg under the tongue every 5 minutes as needed for chest pain.  Refills: 0     nystatin 311798 UNIT/GM external powder  Commonly known as: MYCOSTATIN      Apply topically 2 times daily as needed.  Refills: 0     oxyCODONE 5 MG tablet  Commonly known as: ROXICODONE      Dose: 5 mg  Take 5 mg by mouth every 6 hours as needed for pain.  Refills: 0     pantoprazole 40 MG EC tablet  Commonly known as: PROTONIX      Dose: 40 mg  Take 40 mg by mouth daily.  Refills: 0      potassium chloride ER 20 MEQ CR tablet  Commonly known as: K-TAB      Dose: 20 mEq  Take 20 mEq by mouth 3 times daily.  Refills: 0     * rOPINIRole 1 MG tablet  Commonly known as: REQUIP      Dose: 1 mg  Take 1 mg by mouth at bedtime.  Refills: 0     * rOPINIRole 1 MG tablet  Commonly known as: REQUIP      Dose: 0.5 mg  Take 0.5 mg by mouth 2 times daily. Take one half tablet (0.5 mg) twice daily  Refills: 0     simvastatin 40 MG tablet  Commonly known as: ZOCOR      Dose: 40 mg  [SIMVASTATIN (ZOCOR) 40 MG TABLET] Take 40 mg by mouth bedtime.  Refills: 0     spironolactone 25 MG tablet  Commonly known as: ALDACTONE      Dose: 25 mg  Take 25 mg by mouth at bedtime.  Refills: 0     vitamin B-Complex      Dose: 1 tablet  Take 1 tablet by mouth three times a week. Tuesday, Thursday and Friday  Refills: 0     vitamin E 400 units (180 mg) capsule  Commonly known as: TOCOPHEROL      Dose: 400 Units  Take 400 Units by mouth daily.  Refills: 0     zinc 50 MG Tabs      Dose: 1 tablet  Take 1 tablet by mouth three times a week. Monday, Wednesday and Friday  Refills: 0           * This list has 2 medication(s) that are the same as other medications prescribed for you. Read the directions carefully, and ask your doctor or other care provider to review them with you.                   Where to get your medicines        These medications were sent to [facility]/pharmacy #[id number] - [address] [address]      Phone: [phone]   amoxicillin-clavulanate 875-125 MG tablet       Allergies   Allergies   Allergen Reactions    Other Environmental Allergy Anaphylaxis     Bees/Wasps Stings  Bees/Wasps Stings    Adhesive Tape Other (See Comments)     Red,itchy and oozes  Red,itchy and oozes    Adhesive [Cyanoacrylate] Unknown     Other reaction(s): sores    Latex Hives    Ragweeds Itching    Oxycodone-Acetaminophen Rash    Vicodin [Hydrocodone-Acetaminophen] Nausea and Vomiting and  Hives

## 2025-07-13 NOTE — PLAN OF CARE
Goal Outcome Evaluation:      Plan of Care Reviewed With: patient    Overall Patient Progress: improving     Problem: Skin or Soft Tissue Infection  Goal: Absence of Infection Signs and Symptoms  Outcome: Progressing  Intervention: Minimize and Manage Infection Progression     Problem: Pain Acute  Goal: Optimal Pain Control and Function  Intervention: Prevent or Manage Pain     Patient alert and oriented x4, up with stand by assist and cane. Gave PRN oral dilaudid prior to dressing change to bilateral legs. Oral abx gave per orders for cellulitis. Discharging this evening. Did dressing change. Reviewed AVS with patient including medications, follow up care and when to call provider. Addressed all questions.     Blanca Thompson RN 7/13/2025 3:32 PM

## 2025-07-14 NOTE — PROGRESS NOTES
Physical Therapy Discharge Summary    Reason for therapy discharge:    Discharged to home with home care.    Progress towards therapy goal(s). See goals on Care Plan in [facility] electronic health record for goal details.  Goals partially met.  Barriers to achieving goals:   discharge from facility.    Therapy recommendation(s):    Further recommended therapy is related to documented deficits, and is necessary to maximize functional independence in order for patient to return to prior level of function.      Staci Powell, SAPNA 7/14/2025

## 2025-07-30 ENCOUNTER — LAB REQUISITION (OUTPATIENT)
Dept: LAB | Facility: CLINIC | Age: 80
End: 2025-07-30
Payer: COMMERCIAL

## 2025-07-30 DIAGNOSIS — L97.812 NON-PRESSURE CHRONIC ULCER OF OTHER PART OF RIGHT LOWER LEG WITH FAT LAYER EXPOSED (H): ICD-10-CM

## 2025-07-30 DIAGNOSIS — L03.115 CELLULITIS OF RIGHT LOWER LIMB: ICD-10-CM

## 2025-07-30 DIAGNOSIS — I89.0 LYMPHEDEMA, NOT ELSEWHERE CLASSIFIED: ICD-10-CM

## 2025-07-30 PROCEDURE — 87070 CULTURE OTHR SPECIMN AEROBIC: CPT | Mod: ORL

## 2025-07-31 LAB
BACTERIA WND CULT: ABNORMAL

## 2025-08-02 ENCOUNTER — HOSPITAL ENCOUNTER (INPATIENT)
Facility: HOSPITAL | Age: 80
LOS: 5 days | Discharge: HOME | DRG: 871 | End: 2025-08-07
Attending: EMERGENCY MEDICINE | Admitting: HOSPITALIST
Payer: COMMERCIAL

## 2025-08-02 ENCOUNTER — APPOINTMENT (OUTPATIENT)
Dept: CT IMAGING | Facility: HOSPITAL | Age: 80
DRG: 871 | End: 2025-08-02
Attending: EMERGENCY MEDICINE
Payer: COMMERCIAL

## 2025-08-02 ENCOUNTER — APPOINTMENT (OUTPATIENT)
Dept: RADIOLOGY | Facility: HOSPITAL | Age: 80
DRG: 871 | End: 2025-08-02
Attending: EMERGENCY MEDICINE
Payer: COMMERCIAL

## 2025-08-02 DIAGNOSIS — Z91.89 AT RISK FOR LOSS OF SKIN INTEGRITY: ICD-10-CM

## 2025-08-02 DIAGNOSIS — L03.115 CELLULITIS OF RIGHT LOWER EXTREMITY: ICD-10-CM

## 2025-08-02 DIAGNOSIS — R60.1 GENERALIZED EDEMA: ICD-10-CM

## 2025-08-02 DIAGNOSIS — L97.912 ULCER OF RIGHT LOWER EXTREMITY WITH FAT LAYER EXPOSED (H): ICD-10-CM

## 2025-08-02 DIAGNOSIS — T14.8XXA PAIN ASSOCIATED WITH WOUND: ICD-10-CM

## 2025-08-02 DIAGNOSIS — Z71.89 OTHER SPECIFIED COUNSELING: Chronic | ICD-10-CM

## 2025-08-02 DIAGNOSIS — L97.909 VENOUS STASIS ULCERS (H): ICD-10-CM

## 2025-08-02 DIAGNOSIS — A41.9 SEVERE SEPSIS (H): Primary | ICD-10-CM

## 2025-08-02 DIAGNOSIS — L03.116 BILATERAL CELLULITIS OF LOWER LEG: ICD-10-CM

## 2025-08-02 DIAGNOSIS — R65.20 SEVERE SEPSIS (H): Primary | ICD-10-CM

## 2025-08-02 DIAGNOSIS — I83.009 VENOUS STASIS ULCERS (H): ICD-10-CM

## 2025-08-02 DIAGNOSIS — R52 PAIN ASSOCIATED WITH WOUND: ICD-10-CM

## 2025-08-02 DIAGNOSIS — L97.919 CHRONIC ULCER OF LOWER EXTREMITY, RIGHT, WITH UNSPECIFIED SEVERITY (H): ICD-10-CM

## 2025-08-02 DIAGNOSIS — R53.1 WEAKNESS GENERALIZED: ICD-10-CM

## 2025-08-02 DIAGNOSIS — L03.115 BILATERAL CELLULITIS OF LOWER LEG: ICD-10-CM

## 2025-08-02 PROBLEM — R59.0 INGUINAL LYMPHADENOPATHY: Status: ACTIVE | Noted: 2025-08-02

## 2025-08-02 PROBLEM — N20.1 RIGHT URETERAL STONE: Status: ACTIVE | Noted: 2025-08-02

## 2025-08-02 LAB
ALBUMIN SERPL BCG-MCNC: 3.4 G/DL (ref 3.5–5.2)
ALBUMIN UR-MCNC: 30 MG/DL
ALP SERPL-CCNC: 172 U/L (ref 40–150)
ALT SERPL W P-5'-P-CCNC: 80 U/L (ref 0–50)
ANION GAP SERPL CALCULATED.3IONS-SCNC: 12 MMOL/L (ref 7–15)
APPEARANCE UR: ABNORMAL
AST SERPL W P-5'-P-CCNC: 84 U/L (ref 0–45)
BACTERIA #/AREA URNS HPF: ABNORMAL /HPF
BASOPHILS # BLD AUTO: 0.1 10E3/UL (ref 0–0.2)
BASOPHILS NFR BLD AUTO: 0 %
BILIRUB DIRECT SERPL-MCNC: 0.2 MG/DL (ref 0–0.3)
BILIRUB SERPL-MCNC: 0.4 MG/DL
BILIRUB UR QL STRIP: NEGATIVE
BUN SERPL-MCNC: 23.9 MG/DL (ref 8–23)
CALCIUM SERPL-MCNC: 9.1 MG/DL (ref 8.8–10.4)
CHLORIDE SERPL-SCNC: 102 MMOL/L (ref 98–107)
COLOR UR AUTO: YELLOW
CREAT SERPL-MCNC: 1.12 MG/DL (ref 0.51–0.95)
CRP SERPL-MCNC: 260.6 MG/L
EGFRCR SERPLBLD CKD-EPI 2021: 50 ML/MIN/1.73M2
EOSINOPHIL # BLD AUTO: 0 10E3/UL (ref 0–0.7)
EOSINOPHIL NFR BLD AUTO: 0 %
ERYTHROCYTE [DISTWIDTH] IN BLOOD BY AUTOMATED COUNT: 15.7 % (ref 10–15)
ERYTHROCYTE [SEDIMENTATION RATE] IN BLOOD BY WESTERGREN METHOD: 68 MM/HR (ref 0–30)
GLUCOSE SERPL-MCNC: 111 MG/DL (ref 70–99)
GLUCOSE UR STRIP-MCNC: NEGATIVE MG/DL
HCO3 SERPL-SCNC: 22 MMOL/L (ref 22–29)
HCT VFR BLD AUTO: 32.7 % (ref 35–47)
HGB BLD-MCNC: 10.1 G/DL (ref 11.7–15.7)
HGB UR QL STRIP: NEGATIVE
HOLD SPECIMEN: NORMAL
IMM GRANULOCYTES # BLD: 0.4 10E3/UL
IMM GRANULOCYTES NFR BLD: 2 %
KETONES UR STRIP-MCNC: NEGATIVE MG/DL
LACTATE SERPL-SCNC: 1.9 MMOL/L (ref 0.7–2)
LEUKOCYTE ESTERASE UR QL STRIP: ABNORMAL
LIPASE SERPL-CCNC: 14 U/L (ref 13–60)
LYMPHOCYTES # BLD AUTO: 0.7 10E3/UL (ref 0.8–5.3)
LYMPHOCYTES NFR BLD AUTO: 3 %
MCH RBC QN AUTO: 25 PG (ref 26.5–33)
MCHC RBC AUTO-ENTMCNC: 30.9 G/DL (ref 31.5–36.5)
MCV RBC AUTO: 81 FL (ref 78–100)
MONOCYTES # BLD AUTO: 0.6 10E3/UL (ref 0–1.3)
MONOCYTES NFR BLD AUTO: 3 %
MUCOUS THREADS #/AREA URNS LPF: PRESENT /LPF
NEUTROPHILS # BLD AUTO: 23.5 10E3/UL (ref 1.6–8.3)
NEUTROPHILS NFR BLD AUTO: 93 %
NITRATE UR QL: NEGATIVE
NRBC # BLD AUTO: 0 10E3/UL
NRBC BLD AUTO-RTO: 0 /100
NT-PROBNP SERPL-MCNC: 1960 PG/ML (ref 0–624)
PH UR STRIP: 6 [PH] (ref 5–7)
PLATELET # BLD AUTO: 305 10E3/UL (ref 150–450)
POTASSIUM SERPL-SCNC: 4.2 MMOL/L (ref 3.4–5.3)
PROCALCITONIN SERPL IA-MCNC: 4.15 NG/ML
PROT SERPL-MCNC: 7.5 G/DL (ref 6.4–8.3)
RBC # BLD AUTO: 4.04 10E6/UL (ref 3.8–5.2)
RBC URINE: 0 /HPF
SODIUM SERPL-SCNC: 136 MMOL/L (ref 135–145)
SP GR UR STRIP: 1.01 (ref 1–1.03)
SQUAMOUS EPITHELIAL: 10 /HPF
UROBILINOGEN UR STRIP-MCNC: NORMAL MG/DL
WBC # BLD AUTO: 25.4 10E3/UL (ref 4–11)
WBC URINE: 0 /HPF

## 2025-08-02 PROCEDURE — 99285 EMERGENCY DEPT VISIT HI MDM: CPT | Mod: 25 | Performed by: EMERGENCY MEDICINE

## 2025-08-02 PROCEDURE — 120N000001 HC R&B MED SURG/OB

## 2025-08-02 PROCEDURE — 999N000248 HC STATISTIC IV INSERT WITH US BY RN

## 2025-08-02 PROCEDURE — 84145 PROCALCITONIN (PCT): CPT | Performed by: EMERGENCY MEDICINE

## 2025-08-02 PROCEDURE — 250N000011 HC RX IP 250 OP 636: Performed by: EMERGENCY MEDICINE

## 2025-08-02 PROCEDURE — 87040 BLOOD CULTURE FOR BACTERIA: CPT | Performed by: EMERGENCY MEDICINE

## 2025-08-02 PROCEDURE — 258N000003 HC RX IP 258 OP 636: Performed by: HOSPITALIST

## 2025-08-02 PROCEDURE — 250N000011 HC RX IP 250 OP 636: Performed by: HOSPITALIST

## 2025-08-02 PROCEDURE — 81001 URINALYSIS AUTO W/SCOPE: CPT | Performed by: EMERGENCY MEDICINE

## 2025-08-02 PROCEDURE — 258N000003 HC RX IP 258 OP 636: Performed by: EMERGENCY MEDICINE

## 2025-08-02 PROCEDURE — 85004 AUTOMATED DIFF WBC COUNT: CPT | Performed by: EMERGENCY MEDICINE

## 2025-08-02 PROCEDURE — 36415 COLL VENOUS BLD VENIPUNCTURE: CPT | Performed by: STUDENT IN AN ORGANIZED HEALTH CARE EDUCATION/TRAINING PROGRAM

## 2025-08-02 PROCEDURE — 74177 CT ABD & PELVIS W/CONTRAST: CPT

## 2025-08-02 PROCEDURE — 96368 THER/DIAG CONCURRENT INF: CPT

## 2025-08-02 PROCEDURE — 83605 ASSAY OF LACTIC ACID: CPT | Performed by: EMERGENCY MEDICINE

## 2025-08-02 PROCEDURE — 82565 ASSAY OF CREATININE: CPT | Performed by: EMERGENCY MEDICINE

## 2025-08-02 PROCEDURE — 99223 1ST HOSP IP/OBS HIGH 75: CPT | Performed by: HOSPITALIST

## 2025-08-02 PROCEDURE — 96365 THER/PROPH/DIAG IV INF INIT: CPT | Mod: 59

## 2025-08-02 PROCEDURE — 87640 STAPH A DNA AMP PROBE: CPT | Performed by: HOSPITALIST

## 2025-08-02 PROCEDURE — 71045 X-RAY EXAM CHEST 1 VIEW: CPT

## 2025-08-02 PROCEDURE — 250N000013 HC RX MED GY IP 250 OP 250 PS 637: Performed by: EMERGENCY MEDICINE

## 2025-08-02 PROCEDURE — 36415 COLL VENOUS BLD VENIPUNCTURE: CPT | Performed by: EMERGENCY MEDICINE

## 2025-08-02 PROCEDURE — 82248 BILIRUBIN DIRECT: CPT | Performed by: EMERGENCY MEDICINE

## 2025-08-02 PROCEDURE — 86140 C-REACTIVE PROTEIN: CPT | Performed by: EMERGENCY MEDICINE

## 2025-08-02 PROCEDURE — 85652 RBC SED RATE AUTOMATED: CPT | Performed by: EMERGENCY MEDICINE

## 2025-08-02 PROCEDURE — 83690 ASSAY OF LIPASE: CPT | Performed by: EMERGENCY MEDICINE

## 2025-08-02 PROCEDURE — 83880 ASSAY OF NATRIURETIC PEPTIDE: CPT | Performed by: HOSPITALIST

## 2025-08-02 PROCEDURE — 96366 THER/PROPH/DIAG IV INF ADDON: CPT

## 2025-08-02 RX ORDER — AMOXICILLIN 250 MG
1 CAPSULE ORAL 2 TIMES DAILY PRN
Status: DISCONTINUED | OUTPATIENT
Start: 2025-08-02 | End: 2025-08-07 | Stop reason: HOSPADM

## 2025-08-02 RX ORDER — SODIUM CHLORIDE 9 MG/ML
INJECTION, SOLUTION INTRAVENOUS CONTINUOUS
Status: DISCONTINUED | OUTPATIENT
Start: 2025-08-02 | End: 2025-08-04

## 2025-08-02 RX ORDER — AMOXICILLIN 250 MG
2 CAPSULE ORAL 2 TIMES DAILY PRN
Status: DISCONTINUED | OUTPATIENT
Start: 2025-08-02 | End: 2025-08-07 | Stop reason: HOSPADM

## 2025-08-02 RX ORDER — MORPHINE SULFATE 4 MG/ML
4 INJECTION, SOLUTION INTRAMUSCULAR; INTRAVENOUS ONCE
Refills: 0 | Status: COMPLETED | OUTPATIENT
Start: 2025-08-02 | End: 2025-08-02

## 2025-08-02 RX ORDER — FUROSEMIDE 20 MG/1
40 TABLET ORAL
Status: DISCONTINUED | OUTPATIENT
Start: 2025-08-03 | End: 2025-08-06

## 2025-08-02 RX ORDER — SIMVASTATIN 40 MG
40 TABLET ORAL EVERY MORNING
Status: DISCONTINUED | OUTPATIENT
Start: 2025-08-03 | End: 2025-08-07 | Stop reason: HOSPADM

## 2025-08-02 RX ORDER — MAGNESIUM OXIDE 400 MG/1
400 TABLET ORAL
Status: DISCONTINUED | OUTPATIENT
Start: 2025-08-05 | End: 2025-08-07 | Stop reason: HOSPADM

## 2025-08-02 RX ORDER — PIPERACILLIN SODIUM, TAZOBACTAM SODIUM 4; .5 G/20ML; G/20ML
4.5 INJECTION, POWDER, LYOPHILIZED, FOR SOLUTION INTRAVENOUS ONCE
Status: COMPLETED | OUTPATIENT
Start: 2025-08-02 | End: 2025-08-02

## 2025-08-02 RX ORDER — CALCIUM CARBONATE 500 MG/1
1000 TABLET, CHEWABLE ORAL 4 TIMES DAILY PRN
Status: DISCONTINUED | OUTPATIENT
Start: 2025-08-02 | End: 2025-08-07 | Stop reason: HOSPADM

## 2025-08-02 RX ORDER — OXYCODONE HYDROCHLORIDE 5 MG/1
5 TABLET ORAL ONCE
Refills: 0 | Status: COMPLETED | OUTPATIENT
Start: 2025-08-02 | End: 2025-08-02

## 2025-08-02 RX ORDER — MINOCYCLINE HYDROCHLORIDE 50 MG/1
50 TABLET ORAL AT BEDTIME
COMMUNITY

## 2025-08-02 RX ORDER — OXYCODONE HYDROCHLORIDE 5 MG/1
5 TABLET ORAL EVERY 6 HOURS PRN
Status: DISCONTINUED | OUTPATIENT
Start: 2025-08-02 | End: 2025-08-07 | Stop reason: HOSPADM

## 2025-08-02 RX ORDER — PANTOPRAZOLE SODIUM 40 MG/1
40 TABLET, DELAYED RELEASE ORAL DAILY
Status: DISCONTINUED | OUTPATIENT
Start: 2025-08-03 | End: 2025-08-07 | Stop reason: HOSPADM

## 2025-08-02 RX ORDER — HYDROMORPHONE HCL IN WATER/PF 6 MG/30 ML
0.2 PATIENT CONTROLLED ANALGESIA SYRINGE INTRAVENOUS
Status: DISCONTINUED | OUTPATIENT
Start: 2025-08-02 | End: 2025-08-07 | Stop reason: HOSPADM

## 2025-08-02 RX ORDER — ACETAMINOPHEN 325 MG/1
650 TABLET ORAL EVERY 4 HOURS PRN
Status: DISCONTINUED | OUTPATIENT
Start: 2025-08-02 | End: 2025-08-03

## 2025-08-02 RX ORDER — POTASSIUM CHLORIDE 1500 MG/1
20 TABLET, EXTENDED RELEASE ORAL 2 TIMES DAILY
Status: DISCONTINUED | OUTPATIENT
Start: 2025-08-04 | End: 2025-08-07 | Stop reason: HOSPADM

## 2025-08-02 RX ORDER — ONDANSETRON 4 MG/1
4 TABLET, ORALLY DISINTEGRATING ORAL EVERY 6 HOURS PRN
Status: DISCONTINUED | OUTPATIENT
Start: 2025-08-02 | End: 2025-08-07 | Stop reason: HOSPADM

## 2025-08-02 RX ORDER — ROPINIROLE 0.25 MG/1
0.5 TABLET, FILM COATED ORAL 3 TIMES DAILY
Status: DISCONTINUED | OUTPATIENT
Start: 2025-08-03 | End: 2025-08-02

## 2025-08-02 RX ORDER — ONDANSETRON 4 MG/1
4 TABLET, ORALLY DISINTEGRATING ORAL ONCE
Status: COMPLETED | OUTPATIENT
Start: 2025-08-02 | End: 2025-08-02

## 2025-08-02 RX ORDER — HYDROMORPHONE HCL IN WATER/PF 6 MG/30 ML
0.4 PATIENT CONTROLLED ANALGESIA SYRINGE INTRAVENOUS
Status: DISCONTINUED | OUTPATIENT
Start: 2025-08-02 | End: 2025-08-07 | Stop reason: HOSPADM

## 2025-08-02 RX ORDER — BUPROPION HYDROCHLORIDE 150 MG/1
150 TABLET ORAL EVERY MORNING
Status: DISCONTINUED | OUTPATIENT
Start: 2025-08-03 | End: 2025-08-07 | Stop reason: HOSPADM

## 2025-08-02 RX ORDER — FERROUS SULFATE 325(65) MG
325 TABLET ORAL DAILY
Status: DISCONTINUED | OUTPATIENT
Start: 2025-08-03 | End: 2025-08-07 | Stop reason: HOSPADM

## 2025-08-02 RX ORDER — NITROGLYCERIN 0.4 MG/1
0.4 TABLET SUBLINGUAL EVERY 5 MIN PRN
Status: DISCONTINUED | OUTPATIENT
Start: 2025-08-02 | End: 2025-08-07 | Stop reason: HOSPADM

## 2025-08-02 RX ORDER — GABAPENTIN 100 MG/1
100 CAPSULE ORAL ONCE
Status: COMPLETED | OUTPATIENT
Start: 2025-08-02 | End: 2025-08-02

## 2025-08-02 RX ORDER — ONDANSETRON 2 MG/ML
4 INJECTION INTRAMUSCULAR; INTRAVENOUS EVERY 6 HOURS PRN
Status: DISCONTINUED | OUTPATIENT
Start: 2025-08-02 | End: 2025-08-07 | Stop reason: HOSPADM

## 2025-08-02 RX ORDER — GABAPENTIN 100 MG/1
100 CAPSULE ORAL 3 TIMES DAILY
Status: DISCONTINUED | OUTPATIENT
Start: 2025-08-03 | End: 2025-08-07 | Stop reason: HOSPADM

## 2025-08-02 RX ORDER — LIDOCAINE 40 MG/G
CREAM TOPICAL
Status: DISCONTINUED | OUTPATIENT
Start: 2025-08-02 | End: 2025-08-07 | Stop reason: HOSPADM

## 2025-08-02 RX ORDER — ACETAMINOPHEN 500 MG
1000 TABLET ORAL EVERY 6 HOURS PRN
Status: DISCONTINUED | OUTPATIENT
Start: 2025-08-02 | End: 2025-08-07 | Stop reason: HOSPADM

## 2025-08-02 RX ORDER — CITALOPRAM HYDROBROMIDE 40 MG/1
40 TABLET ORAL
Status: DISCONTINUED | OUTPATIENT
Start: 2025-08-03 | End: 2025-08-07 | Stop reason: HOSPADM

## 2025-08-02 RX ORDER — ERTAPENEM 1 G/1
1 INJECTION, POWDER, LYOPHILIZED, FOR SOLUTION INTRAMUSCULAR; INTRAVENOUS EVERY 24 HOURS
Status: DISCONTINUED | OUTPATIENT
Start: 2025-08-02 | End: 2025-08-03

## 2025-08-02 RX ORDER — ROPINIROLE 1 MG/1
1 TABLET, FILM COATED ORAL AT BEDTIME
Status: DISCONTINUED | OUTPATIENT
Start: 2025-08-03 | End: 2025-08-07 | Stop reason: HOSPADM

## 2025-08-02 RX ORDER — ACETAMINOPHEN 650 MG/1
650 SUPPOSITORY RECTAL EVERY 4 HOURS PRN
Status: DISCONTINUED | OUTPATIENT
Start: 2025-08-02 | End: 2025-08-03

## 2025-08-02 RX ORDER — IOPAMIDOL 755 MG/ML
80 INJECTION, SOLUTION INTRAVASCULAR ONCE
Status: COMPLETED | OUTPATIENT
Start: 2025-08-02 | End: 2025-08-02

## 2025-08-02 RX ORDER — ROPINIROLE 0.25 MG/1
0.5 TABLET, FILM COATED ORAL 2 TIMES DAILY
Status: DISCONTINUED | OUTPATIENT
Start: 2025-08-03 | End: 2025-08-07 | Stop reason: HOSPADM

## 2025-08-02 RX ORDER — MULTIPLE VITAMINS W/ MINERALS TAB 9MG-400MCG
1 TAB ORAL EVERY MORNING
Status: DISCONTINUED | OUTPATIENT
Start: 2025-08-03 | End: 2025-08-07 | Stop reason: HOSPADM

## 2025-08-02 RX ORDER — ACETAMINOPHEN 325 MG/1
650 TABLET ORAL ONCE
Status: COMPLETED | OUTPATIENT
Start: 2025-08-02 | End: 2025-08-02

## 2025-08-02 RX ADMIN — HYDROMORPHONE HYDROCHLORIDE 0.2 MG: 0.2 INJECTION, SOLUTION INTRAMUSCULAR; INTRAVENOUS; SUBCUTANEOUS at 23:51

## 2025-08-02 RX ADMIN — SODIUM CHLORIDE 500 ML: 0.9 INJECTION, SOLUTION INTRAVENOUS at 22:05

## 2025-08-02 RX ADMIN — SODIUM CHLORIDE: 0.9 INJECTION, SOLUTION INTRAVENOUS at 22:17

## 2025-08-02 RX ADMIN — IOPAMIDOL 80 ML: 755 INJECTION, SOLUTION INTRAVENOUS at 20:34

## 2025-08-02 RX ADMIN — GABAPENTIN 100 MG: 100 CAPSULE ORAL at 18:06

## 2025-08-02 RX ADMIN — ONDANSETRON 4 MG: 4 TABLET, ORALLY DISINTEGRATING ORAL at 18:07

## 2025-08-02 RX ADMIN — ACETAMINOPHEN 650 MG: 325 TABLET ORAL at 18:06

## 2025-08-02 RX ADMIN — VANCOMYCIN HYDROCHLORIDE 2250 MG: 1 INJECTION, POWDER, LYOPHILIZED, FOR SOLUTION INTRAVENOUS at 19:45

## 2025-08-02 RX ADMIN — PIPERACILLIN SODIUM AND TAZOBACTAM SODIUM 4.5 G: 4; .5 INJECTION, POWDER, LYOPHILIZED, FOR SOLUTION INTRAVENOUS at 19:50

## 2025-08-02 RX ADMIN — OXYCODONE HYDROCHLORIDE 5 MG: 5 TABLET ORAL at 18:05

## 2025-08-02 ASSESSMENT — ACTIVITIES OF DAILY LIVING (ADL)
ADLS_ACUITY_SCORE: 58

## 2025-08-03 ENCOUNTER — APPOINTMENT (OUTPATIENT)
Dept: PHYSICAL THERAPY | Facility: HOSPITAL | Age: 80
DRG: 871 | End: 2025-08-03
Attending: HOSPITALIST
Payer: COMMERCIAL

## 2025-08-03 LAB
ANION GAP SERPL CALCULATED.3IONS-SCNC: 9 MMOL/L (ref 7–15)
BACTERIA WND CULT: ABNORMAL
BUN SERPL-MCNC: 22 MG/DL (ref 8–23)
CALCIUM SERPL-MCNC: 8.9 MG/DL (ref 8.8–10.4)
CHLORIDE SERPL-SCNC: 105 MMOL/L (ref 98–107)
CREAT SERPL-MCNC: 0.96 MG/DL (ref 0.51–0.95)
EGFRCR SERPLBLD CKD-EPI 2021: 60 ML/MIN/1.73M2
ERYTHROCYTE [DISTWIDTH] IN BLOOD BY AUTOMATED COUNT: 16 % (ref 10–15)
GLUCOSE SERPL-MCNC: 108 MG/DL (ref 70–99)
HCO3 SERPL-SCNC: 24 MMOL/L (ref 22–29)
HCT VFR BLD AUTO: 30.5 % (ref 35–47)
HGB BLD-MCNC: 9 G/DL (ref 11.7–15.7)
MCH RBC QN AUTO: 24.3 PG (ref 26.5–33)
MCHC RBC AUTO-ENTMCNC: 29.5 G/DL (ref 31.5–36.5)
MCV RBC AUTO: 82 FL (ref 78–100)
MRSA DNA SPEC QL NAA+PROBE: NEGATIVE
PLATELET # BLD AUTO: 236 10E3/UL (ref 150–450)
POTASSIUM SERPL-SCNC: 3.9 MMOL/L (ref 3.4–5.3)
RBC # BLD AUTO: 3.7 10E6/UL (ref 3.8–5.2)
SA TARGET DNA: NEGATIVE
SODIUM SERPL-SCNC: 138 MMOL/L (ref 135–145)
WBC # BLD AUTO: 18.4 10E3/UL (ref 4–11)

## 2025-08-03 PROCEDURE — 97161 PT EVAL LOW COMPLEX 20 MIN: CPT | Mod: GP | Performed by: PHYSICAL THERAPIST

## 2025-08-03 PROCEDURE — 99222 1ST HOSP IP/OBS MODERATE 55: CPT | Performed by: INTERNAL MEDICINE

## 2025-08-03 PROCEDURE — 36415 COLL VENOUS BLD VENIPUNCTURE: CPT | Performed by: HOSPITALIST

## 2025-08-03 PROCEDURE — 82310 ASSAY OF CALCIUM: CPT | Performed by: HOSPITALIST

## 2025-08-03 PROCEDURE — 99233 SBSQ HOSP IP/OBS HIGH 50: CPT | Performed by: HOSPITALIST

## 2025-08-03 PROCEDURE — 250N000011 HC RX IP 250 OP 636: Performed by: HOSPITALIST

## 2025-08-03 PROCEDURE — 250N000013 HC RX MED GY IP 250 OP 250 PS 637: Performed by: HOSPITALIST

## 2025-08-03 PROCEDURE — 258N000003 HC RX IP 258 OP 636: Performed by: HOSPITALIST

## 2025-08-03 PROCEDURE — 85027 COMPLETE CBC AUTOMATED: CPT | Performed by: HOSPITALIST

## 2025-08-03 PROCEDURE — 120N000001 HC R&B MED SURG/OB

## 2025-08-03 PROCEDURE — 250N000011 HC RX IP 250 OP 636: Performed by: INTERNAL MEDICINE

## 2025-08-03 PROCEDURE — 97530 THERAPEUTIC ACTIVITIES: CPT | Mod: GP | Performed by: PHYSICAL THERAPIST

## 2025-08-03 RX ORDER — MEROPENEM 1 G/1
1 INJECTION, POWDER, FOR SOLUTION INTRAVENOUS EVERY 8 HOURS
Status: DISCONTINUED | OUTPATIENT
Start: 2025-08-03 | End: 2025-08-07

## 2025-08-03 RX ORDER — ENOXAPARIN SODIUM 100 MG/ML
40 INJECTION SUBCUTANEOUS EVERY 12 HOURS
Status: DISCONTINUED | OUTPATIENT
Start: 2025-08-03 | End: 2025-08-07 | Stop reason: HOSPADM

## 2025-08-03 RX ORDER — NALOXONE HYDROCHLORIDE 0.4 MG/ML
0.2 INJECTION, SOLUTION INTRAMUSCULAR; INTRAVENOUS; SUBCUTANEOUS
Status: DISCONTINUED | OUTPATIENT
Start: 2025-08-03 | End: 2025-08-07 | Stop reason: HOSPADM

## 2025-08-03 RX ORDER — HYDROCORTISONE 10 MG/ML
LOTION TOPICAL 2 TIMES DAILY
Status: DISCONTINUED | OUTPATIENT
Start: 2025-08-03 | End: 2025-08-03

## 2025-08-03 RX ORDER — CEFAZOLIN SODIUM 1 G/50ML
1250 SOLUTION INTRAVENOUS EVERY 24 HOURS
Status: DISCONTINUED | OUTPATIENT
Start: 2025-08-03 | End: 2025-08-05

## 2025-08-03 RX ORDER — BENZOCAINE/MENTHOL 6 MG-10 MG
LOZENGE MUCOUS MEMBRANE 2 TIMES DAILY
Status: DISCONTINUED | OUTPATIENT
Start: 2025-08-03 | End: 2025-08-07 | Stop reason: HOSPADM

## 2025-08-03 RX ORDER — NALOXONE HYDROCHLORIDE 0.4 MG/ML
0.4 INJECTION, SOLUTION INTRAMUSCULAR; INTRAVENOUS; SUBCUTANEOUS
Status: DISCONTINUED | OUTPATIENT
Start: 2025-08-03 | End: 2025-08-07 | Stop reason: HOSPADM

## 2025-08-03 RX ADMIN — ENOXAPARIN SODIUM 40 MG: 40 INJECTION SUBCUTANEOUS at 10:22

## 2025-08-03 RX ADMIN — ROPINIROLE HYDROCHLORIDE 1 MG: 1 TABLET, FILM COATED ORAL at 21:42

## 2025-08-03 RX ADMIN — FUROSEMIDE 40 MG: 20 TABLET ORAL at 08:17

## 2025-08-03 RX ADMIN — PANTOPRAZOLE SODIUM 40 MG: 40 TABLET, DELAYED RELEASE ORAL at 08:17

## 2025-08-03 RX ADMIN — MEROPENEM 1 G: 1 INJECTION, POWDER, FOR SOLUTION INTRAVENOUS at 13:10

## 2025-08-03 RX ADMIN — GABAPENTIN 100 MG: 100 CAPSULE ORAL at 21:42

## 2025-08-03 RX ADMIN — GABAPENTIN 100 MG: 100 CAPSULE ORAL at 08:16

## 2025-08-03 RX ADMIN — SODIUM CHLORIDE: 0.9 INJECTION, SOLUTION INTRAVENOUS at 18:52

## 2025-08-03 RX ADMIN — BUPROPION HYDROCHLORIDE 150 MG: 150 TABLET, FILM COATED, EXTENDED RELEASE ORAL at 08:17

## 2025-08-03 RX ADMIN — SODIUM CHLORIDE 1250 MG: 0.9 INJECTION, SOLUTION INTRAVENOUS at 19:43

## 2025-08-03 RX ADMIN — ENOXAPARIN SODIUM 40 MG: 40 INJECTION SUBCUTANEOUS at 21:42

## 2025-08-03 RX ADMIN — FUROSEMIDE 40 MG: 20 TABLET ORAL at 17:06

## 2025-08-03 RX ADMIN — MEROPENEM 1 G: 1 INJECTION, POWDER, FOR SOLUTION INTRAVENOUS at 21:45

## 2025-08-03 RX ADMIN — SODIUM CHLORIDE: 0.9 INJECTION, SOLUTION INTRAVENOUS at 08:08

## 2025-08-03 RX ADMIN — GABAPENTIN 100 MG: 100 CAPSULE ORAL at 13:11

## 2025-08-03 RX ADMIN — HYDROMORPHONE HYDROCHLORIDE 0.4 MG: 0.2 INJECTION, SOLUTION INTRAMUSCULAR; INTRAVENOUS; SUBCUTANEOUS at 19:41

## 2025-08-03 RX ADMIN — ERTAPENEM SODIUM 1 G: 1 INJECTION, POWDER, LYOPHILIZED, FOR SOLUTION INTRAMUSCULAR; INTRAVENOUS at 03:14

## 2025-08-03 RX ADMIN — Medication 1 TABLET: at 08:17

## 2025-08-03 RX ADMIN — HYDROCORTISONE: 1 CREAM TOPICAL at 21:44

## 2025-08-03 RX ADMIN — ROPINIROLE HYDROCHLORIDE 0.5 MG: 0.25 TABLET, FILM COATED ORAL at 08:16

## 2025-08-03 RX ADMIN — FERROUS SULFATE TAB 325 MG (65 MG ELEMENTAL FE) 325 MG: 325 (65 FE) TAB at 08:17

## 2025-08-03 RX ADMIN — CITALOPRAM 40 MG: 40 TABLET ORAL at 11:32

## 2025-08-03 RX ADMIN — ROPINIROLE HYDROCHLORIDE 0.5 MG: 0.25 TABLET, FILM COATED ORAL at 13:11

## 2025-08-03 RX ADMIN — SIMVASTATIN 40 MG: 40 TABLET, FILM COATED ORAL at 08:17

## 2025-08-03 RX ADMIN — ROPINIROLE HYDROCHLORIDE 1 MG: 1 TABLET, FILM COATED ORAL at 00:30

## 2025-08-03 RX ADMIN — HYDROCORTISONE: 1 CREAM TOPICAL at 10:22

## 2025-08-03 RX ADMIN — HYDROMORPHONE HYDROCHLORIDE 0.4 MG: 0.2 INJECTION, SOLUTION INTRAMUSCULAR; INTRAVENOUS; SUBCUTANEOUS at 08:29

## 2025-08-03 ASSESSMENT — ACTIVITIES OF DAILY LIVING (ADL)
ADLS_ACUITY_SCORE: 36
ADLS_ACUITY_SCORE: 38
ADLS_ACUITY_SCORE: 38
DEPENDENT_IADLS:: INDEPENDENT;TRANSPORTATION
ADLS_ACUITY_SCORE: 37
ADLS_ACUITY_SCORE: 37
ADLS_ACUITY_SCORE: 38
ADLS_ACUITY_SCORE: 36
ADLS_ACUITY_SCORE: 37
ADLS_ACUITY_SCORE: 36
ADLS_ACUITY_SCORE: 36
ADLS_ACUITY_SCORE: 38
ADLS_ACUITY_SCORE: 37
ADLS_ACUITY_SCORE: 37
ADLS_ACUITY_SCORE: 36
ADLS_ACUITY_SCORE: 35
ADLS_ACUITY_SCORE: 36
ADLS_ACUITY_SCORE: 37
ADLS_ACUITY_SCORE: 36
ADLS_ACUITY_SCORE: 37
ADLS_ACUITY_SCORE: 38
ADLS_ACUITY_SCORE: 36
ADLS_ACUITY_SCORE: 36
ADLS_ACUITY_SCORE: 38

## 2025-08-04 ENCOUNTER — APPOINTMENT (OUTPATIENT)
Dept: PHYSICAL THERAPY | Facility: HOSPITAL | Age: 80
DRG: 871 | End: 2025-08-04
Attending: HOSPITALIST
Payer: COMMERCIAL

## 2025-08-04 LAB
ANION GAP SERPL CALCULATED.3IONS-SCNC: 7 MMOL/L (ref 7–15)
BASOPHILS # BLD AUTO: 0 10E3/UL (ref 0–0.2)
BASOPHILS NFR BLD AUTO: 0 %
BUN SERPL-MCNC: 19.3 MG/DL (ref 8–23)
CALCIUM SERPL-MCNC: 8.9 MG/DL (ref 8.8–10.4)
CHLORIDE SERPL-SCNC: 104 MMOL/L (ref 98–107)
CREAT SERPL-MCNC: 0.91 MG/DL (ref 0.51–0.95)
EGFRCR SERPLBLD CKD-EPI 2021: 64 ML/MIN/1.73M2
EOSINOPHIL # BLD AUTO: 0.2 10E3/UL (ref 0–0.7)
EOSINOPHIL NFR BLD AUTO: 2 %
ERYTHROCYTE [DISTWIDTH] IN BLOOD BY AUTOMATED COUNT: 22.7 % (ref 10–15)
GLUCOSE SERPL-MCNC: 104 MG/DL (ref 70–99)
HCO3 SERPL-SCNC: 27 MMOL/L (ref 22–29)
HCT VFR BLD AUTO: 27.3 % (ref 35–47)
HGB BLD-MCNC: 8.5 G/DL (ref 11.7–15.7)
IMM GRANULOCYTES # BLD: 0 10E3/UL
IMM GRANULOCYTES NFR BLD: 0 %
LYMPHOCYTES # BLD AUTO: 0.9 10E3/UL (ref 0.8–5.3)
LYMPHOCYTES NFR BLD AUTO: 8 %
MCH RBC QN AUTO: 26.3 PG (ref 26.5–33)
MCHC RBC AUTO-ENTMCNC: 31.1 G/DL (ref 31.5–36.5)
MCV RBC AUTO: 85 FL (ref 78–100)
MONOCYTES # BLD AUTO: 0.7 10E3/UL (ref 0–1.3)
MONOCYTES NFR BLD AUTO: 7 %
NEUTROPHILS # BLD AUTO: 8.6 10E3/UL (ref 1.6–8.3)
NEUTROPHILS NFR BLD AUTO: 82 %
NRBC # BLD AUTO: 0.1 10E3/UL
NRBC BLD AUTO-RTO: 1 /100
PLAT MORPH BLD: NORMAL
PLATELET # BLD AUTO: 327 10E3/UL (ref 150–450)
POTASSIUM SERPL-SCNC: 3.3 MMOL/L (ref 3.4–5.3)
RBC # BLD AUTO: 3.23 10E6/UL (ref 3.8–5.2)
RBC MORPH BLD: NORMAL
SODIUM SERPL-SCNC: 138 MMOL/L (ref 135–145)
WBC # BLD AUTO: 10.5 10E3/UL (ref 4–11)

## 2025-08-04 PROCEDURE — 99232 SBSQ HOSP IP/OBS MODERATE 35: CPT | Performed by: INTERNAL MEDICINE

## 2025-08-04 PROCEDURE — 250N000011 HC RX IP 250 OP 636: Performed by: INTERNAL MEDICINE

## 2025-08-04 PROCEDURE — 258N000003 HC RX IP 258 OP 636: Performed by: HOSPITALIST

## 2025-08-04 PROCEDURE — 250N000011 HC RX IP 250 OP 636: Performed by: HOSPITALIST

## 2025-08-04 PROCEDURE — 85025 COMPLETE CBC W/AUTO DIFF WBC: CPT | Performed by: INTERNAL MEDICINE

## 2025-08-04 PROCEDURE — 82310 ASSAY OF CALCIUM: CPT | Performed by: HOSPITALIST

## 2025-08-04 PROCEDURE — 36415 COLL VENOUS BLD VENIPUNCTURE: CPT | Performed by: HOSPITALIST

## 2025-08-04 PROCEDURE — 250N000013 HC RX MED GY IP 250 OP 250 PS 637: Performed by: HOSPITALIST

## 2025-08-04 PROCEDURE — 120N000001 HC R&B MED SURG/OB

## 2025-08-04 PROCEDURE — G0545 PR INHRENT VISIT TO INPT/OBS W CNFRM/SUSPCT INFCT DIS BY INFCT DIS SPCIALST: HCPCS | Performed by: INTERNAL MEDICINE

## 2025-08-04 PROCEDURE — 97116 GAIT TRAINING THERAPY: CPT | Mod: GP | Performed by: PHYSICAL THERAPIST

## 2025-08-04 PROCEDURE — 99232 SBSQ HOSP IP/OBS MODERATE 35: CPT | Performed by: HOSPITALIST

## 2025-08-04 PROCEDURE — G0463 HOSPITAL OUTPT CLINIC VISIT: HCPCS

## 2025-08-04 RX ORDER — MINERAL OIL/HYDROPHIL PETROLAT
OINTMENT (GRAM) TOPICAL 2 TIMES DAILY
Status: DISCONTINUED | OUTPATIENT
Start: 2025-08-04 | End: 2025-08-07 | Stop reason: HOSPADM

## 2025-08-04 RX ADMIN — Medication 1 TABLET: at 08:56

## 2025-08-04 RX ADMIN — ANORECTAL OINTMENT: 15.7; .44; 24; 20.6 OINTMENT TOPICAL at 20:39

## 2025-08-04 RX ADMIN — ROPINIROLE HYDROCHLORIDE 1 MG: 1 TABLET, FILM COATED ORAL at 20:38

## 2025-08-04 RX ADMIN — HYDROMORPHONE HYDROCHLORIDE 0.4 MG: 0.2 INJECTION, SOLUTION INTRAMUSCULAR; INTRAVENOUS; SUBCUTANEOUS at 20:50

## 2025-08-04 RX ADMIN — POTASSIUM CHLORIDE 20 MEQ: 1500 TABLET, EXTENDED RELEASE ORAL at 08:56

## 2025-08-04 RX ADMIN — HYDROMORPHONE HYDROCHLORIDE 0.4 MG: 0.2 INJECTION, SOLUTION INTRAMUSCULAR; INTRAVENOUS; SUBCUTANEOUS at 14:14

## 2025-08-04 RX ADMIN — ROPINIROLE HYDROCHLORIDE 0.5 MG: 0.25 TABLET, FILM COATED ORAL at 08:58

## 2025-08-04 RX ADMIN — CITALOPRAM 40 MG: 40 TABLET ORAL at 14:13

## 2025-08-04 RX ADMIN — HYDROMORPHONE HYDROCHLORIDE 0.4 MG: 0.2 INJECTION, SOLUTION INTRAMUSCULAR; INTRAVENOUS; SUBCUTANEOUS at 09:13

## 2025-08-04 RX ADMIN — HYDROCORTISONE: 1 CREAM TOPICAL at 20:43

## 2025-08-04 RX ADMIN — FUROSEMIDE 40 MG: 20 TABLET ORAL at 08:57

## 2025-08-04 RX ADMIN — MEROPENEM 1 G: 1 INJECTION, POWDER, FOR SOLUTION INTRAVENOUS at 05:14

## 2025-08-04 RX ADMIN — PANTOPRAZOLE SODIUM 40 MG: 40 TABLET, DELAYED RELEASE ORAL at 08:56

## 2025-08-04 RX ADMIN — GABAPENTIN 100 MG: 100 CAPSULE ORAL at 08:57

## 2025-08-04 RX ADMIN — ROPINIROLE HYDROCHLORIDE 0.5 MG: 0.25 TABLET, FILM COATED ORAL at 14:12

## 2025-08-04 RX ADMIN — FERROUS SULFATE TAB 325 MG (65 MG ELEMENTAL FE) 325 MG: 325 (65 FE) TAB at 08:57

## 2025-08-04 RX ADMIN — GABAPENTIN 100 MG: 100 CAPSULE ORAL at 14:13

## 2025-08-04 RX ADMIN — BUPROPION HYDROCHLORIDE 150 MG: 150 TABLET, FILM COATED, EXTENDED RELEASE ORAL at 08:56

## 2025-08-04 RX ADMIN — POTASSIUM CHLORIDE 20 MEQ: 1500 TABLET, EXTENDED RELEASE ORAL at 20:38

## 2025-08-04 RX ADMIN — ENOXAPARIN SODIUM 40 MG: 40 INJECTION SUBCUTANEOUS at 08:56

## 2025-08-04 RX ADMIN — SODIUM CHLORIDE: 0.9 INJECTION, SOLUTION INTRAVENOUS at 05:15

## 2025-08-04 RX ADMIN — HYDROCORTISONE 1 APPLICATOR: 1 CREAM TOPICAL at 08:55

## 2025-08-04 RX ADMIN — FUROSEMIDE 40 MG: 20 TABLET ORAL at 20:38

## 2025-08-04 RX ADMIN — SODIUM CHLORIDE: 0.9 INJECTION, SOLUTION INTRAVENOUS at 14:13

## 2025-08-04 RX ADMIN — GABAPENTIN 100 MG: 100 CAPSULE ORAL at 20:38

## 2025-08-04 RX ADMIN — MEROPENEM 1 G: 1 INJECTION, POWDER, FOR SOLUTION INTRAVENOUS at 14:13

## 2025-08-04 RX ADMIN — WHITE PETROLATUM: 1.75 OINTMENT TOPICAL at 20:41

## 2025-08-04 RX ADMIN — SODIUM CHLORIDE 1250 MG: 0.9 INJECTION, SOLUTION INTRAVENOUS at 20:37

## 2025-08-04 RX ADMIN — ENOXAPARIN SODIUM 40 MG: 40 INJECTION SUBCUTANEOUS at 20:38

## 2025-08-04 RX ADMIN — SIMVASTATIN 40 MG: 40 TABLET, FILM COATED ORAL at 08:56

## 2025-08-04 ASSESSMENT — ACTIVITIES OF DAILY LIVING (ADL)
ADLS_ACUITY_SCORE: 40
ADLS_ACUITY_SCORE: 38
ADLS_ACUITY_SCORE: 37
ADLS_ACUITY_SCORE: 38
ADLS_ACUITY_SCORE: 40
ADLS_ACUITY_SCORE: 40
ADLS_ACUITY_SCORE: 38
ADLS_ACUITY_SCORE: 37
ADLS_ACUITY_SCORE: 40
ADLS_ACUITY_SCORE: 38
ADLS_ACUITY_SCORE: 37
ADLS_ACUITY_SCORE: 38
ADLS_ACUITY_SCORE: 37
ADLS_ACUITY_SCORE: 38
ADLS_ACUITY_SCORE: 37
ADLS_ACUITY_SCORE: 38
ADLS_ACUITY_SCORE: 37
ADLS_ACUITY_SCORE: 38

## 2025-08-05 ENCOUNTER — APPOINTMENT (OUTPATIENT)
Dept: PHYSICAL THERAPY | Facility: HOSPITAL | Age: 80
DRG: 871 | End: 2025-08-05
Payer: COMMERCIAL

## 2025-08-05 LAB
CREAT SERPL-MCNC: 0.87 MG/DL (ref 0.51–0.95)
CRP SERPL-MCNC: 149 MG/L
EGFRCR SERPLBLD CKD-EPI 2021: 68 ML/MIN/1.73M2
ERYTHROCYTE [DISTWIDTH] IN BLOOD BY AUTOMATED COUNT: 15.9 % (ref 10–15)
HCT VFR BLD AUTO: 27.8 % (ref 35–47)
HGB BLD-MCNC: 8.6 G/DL (ref 11.7–15.7)
MCH RBC QN AUTO: 25.1 PG (ref 26.5–33)
MCHC RBC AUTO-ENTMCNC: 30.9 G/DL (ref 31.5–36.5)
MCV RBC AUTO: 81 FL (ref 78–100)
PLATELET # BLD AUTO: 243 10E3/UL (ref 150–450)
RBC # BLD AUTO: 3.43 10E6/UL (ref 3.8–5.2)
VANCOMYCIN SERPL-MCNC: 16.8 UG/ML (ref ?–25)
WBC # BLD AUTO: 8.8 10E3/UL (ref 4–11)

## 2025-08-05 PROCEDURE — 99232 SBSQ HOSP IP/OBS MODERATE 35: CPT | Performed by: INTERNAL MEDICINE

## 2025-08-05 PROCEDURE — 36415 COLL VENOUS BLD VENIPUNCTURE: CPT | Performed by: HOSPITALIST

## 2025-08-05 PROCEDURE — 250N000011 HC RX IP 250 OP 636: Performed by: INTERNAL MEDICINE

## 2025-08-05 PROCEDURE — 250N000011 HC RX IP 250 OP 636: Performed by: HOSPITALIST

## 2025-08-05 PROCEDURE — 120N000001 HC R&B MED SURG/OB

## 2025-08-05 PROCEDURE — 99232 SBSQ HOSP IP/OBS MODERATE 35: CPT | Performed by: HOSPITALIST

## 2025-08-05 PROCEDURE — 86140 C-REACTIVE PROTEIN: CPT | Performed by: HOSPITALIST

## 2025-08-05 PROCEDURE — 999N000248 HC STATISTIC IV INSERT WITH US BY RN

## 2025-08-05 PROCEDURE — 97116 GAIT TRAINING THERAPY: CPT | Mod: GP | Performed by: PHYSICAL THERAPIST

## 2025-08-05 PROCEDURE — 250N000013 HC RX MED GY IP 250 OP 250 PS 637: Performed by: HOSPITALIST

## 2025-08-05 PROCEDURE — 80202 ASSAY OF VANCOMYCIN: CPT | Performed by: HOSPITALIST

## 2025-08-05 PROCEDURE — G0545 PR INHRENT VISIT TO INPT/OBS W CNFRM/SUSPCT INFCT DIS BY INFCT DIS SPCIALST: HCPCS | Performed by: INTERNAL MEDICINE

## 2025-08-05 PROCEDURE — 82565 ASSAY OF CREATININE: CPT | Performed by: HOSPITALIST

## 2025-08-05 PROCEDURE — 85027 COMPLETE CBC AUTOMATED: CPT | Performed by: HOSPITALIST

## 2025-08-05 PROCEDURE — 258N000003 HC RX IP 258 OP 636: Performed by: HOSPITALIST

## 2025-08-05 RX ORDER — SPIRONOLACTONE 25 MG/1
25 TABLET ORAL AT BEDTIME
Status: DISCONTINUED | OUTPATIENT
Start: 2025-08-05 | End: 2025-08-07 | Stop reason: HOSPADM

## 2025-08-05 RX ADMIN — FERROUS SULFATE TAB 325 MG (65 MG ELEMENTAL FE) 325 MG: 325 (65 FE) TAB at 09:47

## 2025-08-05 RX ADMIN — ENOXAPARIN SODIUM 40 MG: 40 INJECTION SUBCUTANEOUS at 20:55

## 2025-08-05 RX ADMIN — SIMVASTATIN 40 MG: 40 TABLET, FILM COATED ORAL at 09:47

## 2025-08-05 RX ADMIN — PANTOPRAZOLE SODIUM 40 MG: 40 TABLET, DELAYED RELEASE ORAL at 09:47

## 2025-08-05 RX ADMIN — POTASSIUM CHLORIDE 20 MEQ: 1500 TABLET, EXTENDED RELEASE ORAL at 09:46

## 2025-08-05 RX ADMIN — ROPINIROLE HYDROCHLORIDE 0.5 MG: 0.25 TABLET, FILM COATED ORAL at 16:49

## 2025-08-05 RX ADMIN — SPIRONOLACTONE 25 MG: 25 TABLET, FILM COATED ORAL at 20:55

## 2025-08-05 RX ADMIN — SODIUM CHLORIDE 1500 MG: 0.9 INJECTION, SOLUTION INTRAVENOUS at 13:35

## 2025-08-05 RX ADMIN — GABAPENTIN 100 MG: 100 CAPSULE ORAL at 20:55

## 2025-08-05 RX ADMIN — OXYCODONE HYDROCHLORIDE 5 MG: 5 TABLET ORAL at 10:15

## 2025-08-05 RX ADMIN — ROPINIROLE HYDROCHLORIDE 0.5 MG: 0.25 TABLET, FILM COATED ORAL at 09:47

## 2025-08-05 RX ADMIN — BUPROPION HYDROCHLORIDE 150 MG: 150 TABLET, FILM COATED, EXTENDED RELEASE ORAL at 09:47

## 2025-08-05 RX ADMIN — GABAPENTIN 100 MG: 100 CAPSULE ORAL at 09:47

## 2025-08-05 RX ADMIN — FUROSEMIDE 40 MG: 20 TABLET ORAL at 09:47

## 2025-08-05 RX ADMIN — HYDROMORPHONE HYDROCHLORIDE 0.4 MG: 0.2 INJECTION, SOLUTION INTRAMUSCULAR; INTRAVENOUS; SUBCUTANEOUS at 04:25

## 2025-08-05 RX ADMIN — ENOXAPARIN SODIUM 40 MG: 40 INJECTION SUBCUTANEOUS at 09:47

## 2025-08-05 RX ADMIN — POTASSIUM CHLORIDE 20 MEQ: 1500 TABLET, EXTENDED RELEASE ORAL at 20:55

## 2025-08-05 RX ADMIN — GABAPENTIN 100 MG: 100 CAPSULE ORAL at 13:35

## 2025-08-05 RX ADMIN — WHITE PETROLATUM: 1.75 OINTMENT TOPICAL at 12:52

## 2025-08-05 RX ADMIN — ANORECTAL OINTMENT 1 G: 15.7; .44; 24; 20.6 OINTMENT TOPICAL at 10:14

## 2025-08-05 RX ADMIN — HYDROCORTISONE: 1 CREAM TOPICAL at 12:54

## 2025-08-05 RX ADMIN — Medication 1 TABLET: at 09:47

## 2025-08-05 RX ADMIN — OXYCODONE HYDROCHLORIDE 5 MG: 5 TABLET ORAL at 20:55

## 2025-08-05 RX ADMIN — FUROSEMIDE 40 MG: 20 TABLET ORAL at 18:16

## 2025-08-05 RX ADMIN — ROPINIROLE HYDROCHLORIDE 1 MG: 1 TABLET, FILM COATED ORAL at 20:55

## 2025-08-05 RX ADMIN — CITALOPRAM 40 MG: 40 TABLET ORAL at 12:54

## 2025-08-05 RX ADMIN — MEROPENEM 1 G: 1 INJECTION, POWDER, FOR SOLUTION INTRAVENOUS at 16:55

## 2025-08-05 RX ADMIN — MEROPENEM 1 G: 1 INJECTION, POWDER, FOR SOLUTION INTRAVENOUS at 09:48

## 2025-08-05 RX ADMIN — MEROPENEM 1 G: 1 INJECTION, POWDER, FOR SOLUTION INTRAVENOUS at 00:18

## 2025-08-05 RX ADMIN — MAGNESIUM OXIDE TAB 400 MG (241.3 MG ELEMENTAL MG) 400 MG: 400 (241.3 MG) TAB at 09:47

## 2025-08-05 ASSESSMENT — ACTIVITIES OF DAILY LIVING (ADL)
ADLS_ACUITY_SCORE: 38
ADLS_ACUITY_SCORE: 40
ADLS_ACUITY_SCORE: 38
ADLS_ACUITY_SCORE: 37
ADLS_ACUITY_SCORE: 38
ADLS_ACUITY_SCORE: 40
ADLS_ACUITY_SCORE: 40
ADLS_ACUITY_SCORE: 37
ADLS_ACUITY_SCORE: 40
ADLS_ACUITY_SCORE: 38
ADLS_ACUITY_SCORE: 38

## 2025-08-06 LAB
CREAT SERPL-MCNC: 0.77 MG/DL (ref 0.51–0.95)
EGFRCR SERPLBLD CKD-EPI 2021: 79 ML/MIN/1.73M2
GLUCOSE BLDC GLUCOMTR-MCNC: 122 MG/DL (ref 70–99)
MCV RBC AUTO: 81 FL (ref 78–100)
PLATELET # BLD AUTO: 253 10E3/UL (ref 150–450)
POTASSIUM SERPL-SCNC: 3.3 MMOL/L (ref 3.4–5.3)
POTASSIUM SERPL-SCNC: 3.7 MMOL/L (ref 3.4–5.3)
POTASSIUM SERPL-SCNC: 3.9 MMOL/L (ref 3.4–5.3)

## 2025-08-06 PROCEDURE — 258N000003 HC RX IP 258 OP 636: Performed by: HOSPITALIST

## 2025-08-06 PROCEDURE — G0463 HOSPITAL OUTPT CLINIC VISIT: HCPCS

## 2025-08-06 PROCEDURE — 120N000001 HC R&B MED SURG/OB

## 2025-08-06 PROCEDURE — 93005 ELECTROCARDIOGRAM TRACING: CPT

## 2025-08-06 PROCEDURE — 250N000011 HC RX IP 250 OP 636: Performed by: HOSPITALIST

## 2025-08-06 PROCEDURE — 82565 ASSAY OF CREATININE: CPT | Performed by: HOSPITALIST

## 2025-08-06 PROCEDURE — 250N000013 HC RX MED GY IP 250 OP 250 PS 637: Performed by: HOSPITALIST

## 2025-08-06 PROCEDURE — G0545 PR INHRENT VISIT TO INPT/OBS W CNFRM/SUSPCT INFCT DIS BY INFCT DIS SPCIALST: HCPCS | Performed by: INTERNAL MEDICINE

## 2025-08-06 PROCEDURE — 250N000011 HC RX IP 250 OP 636: Performed by: INTERNAL MEDICINE

## 2025-08-06 PROCEDURE — 85049 AUTOMATED PLATELET COUNT: CPT | Performed by: HOSPITALIST

## 2025-08-06 PROCEDURE — 36415 COLL VENOUS BLD VENIPUNCTURE: CPT | Performed by: HOSPITALIST

## 2025-08-06 PROCEDURE — 99233 SBSQ HOSP IP/OBS HIGH 50: CPT | Performed by: STUDENT IN AN ORGANIZED HEALTH CARE EDUCATION/TRAINING PROGRAM

## 2025-08-06 PROCEDURE — 999N000197 HC STATISTIC WOC PT EDUCATION, 0-15 MIN

## 2025-08-06 PROCEDURE — 84132 ASSAY OF SERUM POTASSIUM: CPT | Performed by: STUDENT IN AN ORGANIZED HEALTH CARE EDUCATION/TRAINING PROGRAM

## 2025-08-06 PROCEDURE — 99232 SBSQ HOSP IP/OBS MODERATE 35: CPT | Performed by: INTERNAL MEDICINE

## 2025-08-06 PROCEDURE — 93005 ELECTROCARDIOGRAM TRACING: CPT | Performed by: STUDENT IN AN ORGANIZED HEALTH CARE EDUCATION/TRAINING PROGRAM

## 2025-08-06 PROCEDURE — 250N000013 HC RX MED GY IP 250 OP 250 PS 637: Performed by: STUDENT IN AN ORGANIZED HEALTH CARE EDUCATION/TRAINING PROGRAM

## 2025-08-06 PROCEDURE — 36415 COLL VENOUS BLD VENIPUNCTURE: CPT | Performed by: STUDENT IN AN ORGANIZED HEALTH CARE EDUCATION/TRAINING PROGRAM

## 2025-08-06 RX ORDER — POTASSIUM CHLORIDE 1500 MG/1
40 TABLET, EXTENDED RELEASE ORAL ONCE
Status: COMPLETED | OUTPATIENT
Start: 2025-08-06 | End: 2025-08-06

## 2025-08-06 RX ORDER — FUROSEMIDE 20 MG/1
60 TABLET ORAL
Status: DISCONTINUED | OUTPATIENT
Start: 2025-08-06 | End: 2025-08-07 | Stop reason: HOSPADM

## 2025-08-06 RX ADMIN — POTASSIUM CHLORIDE 20 MEQ: 1500 TABLET, EXTENDED RELEASE ORAL at 08:04

## 2025-08-06 RX ADMIN — BUPROPION HYDROCHLORIDE 150 MG: 150 TABLET, FILM COATED, EXTENDED RELEASE ORAL at 08:07

## 2025-08-06 RX ADMIN — SIMVASTATIN 40 MG: 40 TABLET, FILM COATED ORAL at 08:03

## 2025-08-06 RX ADMIN — CITALOPRAM 40 MG: 40 TABLET ORAL at 11:20

## 2025-08-06 RX ADMIN — HYDROMORPHONE HYDROCHLORIDE 0.4 MG: 0.2 INJECTION, SOLUTION INTRAMUSCULAR; INTRAVENOUS; SUBCUTANEOUS at 21:15

## 2025-08-06 RX ADMIN — WHITE PETROLATUM: 1.75 OINTMENT TOPICAL at 20:32

## 2025-08-06 RX ADMIN — ROPINIROLE HYDROCHLORIDE 1 MG: 1 TABLET, FILM COATED ORAL at 20:49

## 2025-08-06 RX ADMIN — OXYCODONE HYDROCHLORIDE 5 MG: 5 TABLET ORAL at 11:35

## 2025-08-06 RX ADMIN — ROPINIROLE HYDROCHLORIDE 0.5 MG: 0.25 TABLET, FILM COATED ORAL at 13:35

## 2025-08-06 RX ADMIN — HYDROCORTISONE: 1 CREAM TOPICAL at 08:08

## 2025-08-06 RX ADMIN — POTASSIUM CHLORIDE 20 MEQ: 1500 TABLET, EXTENDED RELEASE ORAL at 20:32

## 2025-08-06 RX ADMIN — FUROSEMIDE 60 MG: 20 TABLET ORAL at 17:49

## 2025-08-06 RX ADMIN — PANTOPRAZOLE SODIUM 40 MG: 40 TABLET, DELAYED RELEASE ORAL at 08:07

## 2025-08-06 RX ADMIN — Medication 1 TABLET: at 08:06

## 2025-08-06 RX ADMIN — GABAPENTIN 100 MG: 100 CAPSULE ORAL at 08:04

## 2025-08-06 RX ADMIN — ENOXAPARIN SODIUM 40 MG: 40 INJECTION SUBCUTANEOUS at 20:35

## 2025-08-06 RX ADMIN — MEROPENEM 1 G: 1 INJECTION, POWDER, FOR SOLUTION INTRAVENOUS at 08:02

## 2025-08-06 RX ADMIN — GABAPENTIN 100 MG: 100 CAPSULE ORAL at 20:32

## 2025-08-06 RX ADMIN — SPIRONOLACTONE 25 MG: 25 TABLET, FILM COATED ORAL at 20:32

## 2025-08-06 RX ADMIN — GABAPENTIN 100 MG: 100 CAPSULE ORAL at 13:35

## 2025-08-06 RX ADMIN — MEROPENEM 1 G: 1 INJECTION, POWDER, FOR SOLUTION INTRAVENOUS at 00:21

## 2025-08-06 RX ADMIN — ANORECTAL OINTMENT: 15.7; .44; 24; 20.6 OINTMENT TOPICAL at 08:07

## 2025-08-06 RX ADMIN — POTASSIUM CHLORIDE 40 MEQ: 1500 TABLET, EXTENDED RELEASE ORAL at 17:49

## 2025-08-06 RX ADMIN — ENOXAPARIN SODIUM 40 MG: 40 INJECTION SUBCUTANEOUS at 08:03

## 2025-08-06 RX ADMIN — OXYCODONE HYDROCHLORIDE 5 MG: 5 TABLET ORAL at 19:45

## 2025-08-06 RX ADMIN — SODIUM CHLORIDE 1500 MG: 0.9 INJECTION, SOLUTION INTRAVENOUS at 12:00

## 2025-08-06 RX ADMIN — FERROUS SULFATE TAB 325 MG (65 MG ELEMENTAL FE) 325 MG: 325 (65 FE) TAB at 08:07

## 2025-08-06 RX ADMIN — WHITE PETROLATUM: 1.75 OINTMENT TOPICAL at 08:05

## 2025-08-06 RX ADMIN — MEROPENEM 1 G: 1 INJECTION, POWDER, FOR SOLUTION INTRAVENOUS at 17:46

## 2025-08-06 RX ADMIN — FUROSEMIDE 40 MG: 20 TABLET ORAL at 08:04

## 2025-08-06 RX ADMIN — ROPINIROLE HYDROCHLORIDE 0.5 MG: 0.25 TABLET, FILM COATED ORAL at 08:06

## 2025-08-06 ASSESSMENT — ACTIVITIES OF DAILY LIVING (ADL)
ADLS_ACUITY_SCORE: 40
ADLS_ACUITY_SCORE: 41
ADLS_ACUITY_SCORE: 40
ADLS_ACUITY_SCORE: 41
ADLS_ACUITY_SCORE: 40
ADLS_ACUITY_SCORE: 40

## 2025-08-07 ENCOUNTER — APPOINTMENT (OUTPATIENT)
Dept: PHYSICAL THERAPY | Facility: HOSPITAL | Age: 80
DRG: 871 | End: 2025-08-07
Payer: COMMERCIAL

## 2025-08-07 VITALS
SYSTOLIC BLOOD PRESSURE: 144 MMHG | RESPIRATION RATE: 18 BRPM | HEART RATE: 74 BPM | DIASTOLIC BLOOD PRESSURE: 63 MMHG | TEMPERATURE: 98.1 F | WEIGHT: 246.03 LBS | OXYGEN SATURATION: 96 % | HEIGHT: 60 IN | BODY MASS INDEX: 48.3 KG/M2

## 2025-08-07 LAB
ANION GAP SERPL CALCULATED.3IONS-SCNC: 11 MMOL/L (ref 7–15)
ATRIAL RATE - MUSE: 77 BPM
BACTERIA SPEC CULT: NO GROWTH
BACTERIA SPEC CULT: NO GROWTH
BUN SERPL-MCNC: 17.9 MG/DL (ref 8–23)
CALCIUM SERPL-MCNC: 8.8 MG/DL (ref 8.8–10.4)
CHLORIDE SERPL-SCNC: 95 MMOL/L (ref 98–107)
CREAT SERPL-MCNC: 0.82 MG/DL (ref 0.51–0.95)
DIASTOLIC BLOOD PRESSURE - MUSE: NORMAL MMHG
EGFRCR SERPLBLD CKD-EPI 2021: 73 ML/MIN/1.73M2
GLUCOSE SERPL-MCNC: 102 MG/DL (ref 70–99)
HCO3 SERPL-SCNC: 33 MMOL/L (ref 22–29)
HOLD SPECIMEN: NORMAL
INTERPRETATION ECG - MUSE: NORMAL
P AXIS - MUSE: 55 DEGREES
POTASSIUM SERPL-SCNC: 3.8 MMOL/L (ref 3.4–5.3)
PR INTERVAL - MUSE: 160 MS
QRS DURATION - MUSE: 94 MS
QT - MUSE: 408 MS
QTC - MUSE: 461 MS
R AXIS - MUSE: -13 DEGREES
SODIUM SERPL-SCNC: 139 MMOL/L (ref 135–145)
SYSTOLIC BLOOD PRESSURE - MUSE: NORMAL MMHG
T AXIS - MUSE: 26 DEGREES
VENTRICULAR RATE- MUSE: 77 BPM

## 2025-08-07 PROCEDURE — 97530 THERAPEUTIC ACTIVITIES: CPT | Mod: GP

## 2025-08-07 PROCEDURE — 99239 HOSP IP/OBS DSCHRG MGMT >30: CPT | Performed by: STUDENT IN AN ORGANIZED HEALTH CARE EDUCATION/TRAINING PROGRAM

## 2025-08-07 PROCEDURE — 84132 ASSAY OF SERUM POTASSIUM: CPT | Performed by: STUDENT IN AN ORGANIZED HEALTH CARE EDUCATION/TRAINING PROGRAM

## 2025-08-07 PROCEDURE — 250N000011 HC RX IP 250 OP 636: Performed by: INTERNAL MEDICINE

## 2025-08-07 PROCEDURE — 36415 COLL VENOUS BLD VENIPUNCTURE: CPT | Performed by: STUDENT IN AN ORGANIZED HEALTH CARE EDUCATION/TRAINING PROGRAM

## 2025-08-07 PROCEDURE — 250N000013 HC RX MED GY IP 250 OP 250 PS 637: Performed by: HOSPITALIST

## 2025-08-07 PROCEDURE — 250N000013 HC RX MED GY IP 250 OP 250 PS 637: Performed by: STUDENT IN AN ORGANIZED HEALTH CARE EDUCATION/TRAINING PROGRAM

## 2025-08-07 PROCEDURE — 97116 GAIT TRAINING THERAPY: CPT | Mod: GP

## 2025-08-07 RX ORDER — MINOCYCLINE HYDROCHLORIDE 50 MG/1
50 TABLET ORAL AT BEDTIME
Status: DISCONTINUED | OUTPATIENT
Start: 2025-08-07 | End: 2025-08-07

## 2025-08-07 RX ORDER — MINOCYCLINE HYDROCHLORIDE 50 MG/1
50 CAPSULE ORAL AT BEDTIME
Status: DISCONTINUED | OUTPATIENT
Start: 2025-08-07 | End: 2025-08-07 | Stop reason: HOSPADM

## 2025-08-07 RX ORDER — OXYCODONE HYDROCHLORIDE 5 MG/1
5 TABLET ORAL EVERY 6 HOURS PRN
Qty: 12 TABLET | Refills: 0 | Status: SHIPPED | OUTPATIENT
Start: 2025-08-07

## 2025-08-07 RX ORDER — FUROSEMIDE 20 MG/1
60 TABLET ORAL
DISCHARGE
Start: 2025-08-07

## 2025-08-07 RX ADMIN — OXYCODONE HYDROCHLORIDE 5 MG: 5 TABLET ORAL at 13:48

## 2025-08-07 RX ADMIN — SIMVASTATIN 40 MG: 40 TABLET, FILM COATED ORAL at 08:43

## 2025-08-07 RX ADMIN — ROPINIROLE HYDROCHLORIDE 0.5 MG: 0.25 TABLET, FILM COATED ORAL at 08:46

## 2025-08-07 RX ADMIN — FUROSEMIDE 60 MG: 20 TABLET ORAL at 08:45

## 2025-08-07 RX ADMIN — GABAPENTIN 100 MG: 100 CAPSULE ORAL at 08:44

## 2025-08-07 RX ADMIN — Medication 1 TABLET: at 08:43

## 2025-08-07 RX ADMIN — HYDROCORTISONE: 1 CREAM TOPICAL at 08:39

## 2025-08-07 RX ADMIN — ROPINIROLE HYDROCHLORIDE 0.5 MG: 0.25 TABLET, FILM COATED ORAL at 13:48

## 2025-08-07 RX ADMIN — MAGNESIUM OXIDE TAB 400 MG (241.3 MG ELEMENTAL MG) 400 MG: 400 (241.3 MG) TAB at 08:45

## 2025-08-07 RX ADMIN — CITALOPRAM 40 MG: 40 TABLET ORAL at 12:53

## 2025-08-07 RX ADMIN — MEROPENEM 1 G: 1 INJECTION, POWDER, FOR SOLUTION INTRAVENOUS at 01:04

## 2025-08-07 RX ADMIN — GABAPENTIN 100 MG: 100 CAPSULE ORAL at 13:48

## 2025-08-07 RX ADMIN — FERROUS SULFATE TAB 325 MG (65 MG ELEMENTAL FE) 325 MG: 325 (65 FE) TAB at 08:43

## 2025-08-07 RX ADMIN — PANTOPRAZOLE SODIUM 40 MG: 40 TABLET, DELAYED RELEASE ORAL at 08:44

## 2025-08-07 RX ADMIN — WHITE PETROLATUM: 1.75 OINTMENT TOPICAL at 08:39

## 2025-08-07 RX ADMIN — OXYCODONE HYDROCHLORIDE 5 MG: 5 TABLET ORAL at 06:24

## 2025-08-07 RX ADMIN — POTASSIUM CHLORIDE 20 MEQ: 1500 TABLET, EXTENDED RELEASE ORAL at 08:44

## 2025-08-07 RX ADMIN — BUPROPION HYDROCHLORIDE 150 MG: 150 TABLET, FILM COATED, EXTENDED RELEASE ORAL at 08:45

## 2025-08-07 ASSESSMENT — ACTIVITIES OF DAILY LIVING (ADL)
ADLS_ACUITY_SCORE: 41

## 2025-08-14 ENCOUNTER — LAB REQUISITION (OUTPATIENT)
Dept: LAB | Facility: CLINIC | Age: 80
End: 2025-08-14
Payer: COMMERCIAL

## 2025-08-14 PROCEDURE — 80053 COMPREHEN METABOLIC PANEL: CPT | Mod: ORL

## 2025-08-15 LAB
ALBUMIN SERPL BCG-MCNC: 4 G/DL (ref 3.5–5.2)
ALP SERPL-CCNC: 129 U/L (ref 40–150)
ALT SERPL W P-5'-P-CCNC: 18 U/L (ref 0–50)
ANION GAP SERPL CALCULATED.3IONS-SCNC: 16 MMOL/L (ref 7–15)
AST SERPL W P-5'-P-CCNC: 15 U/L (ref 0–45)
BILIRUB SERPL-MCNC: 0.2 MG/DL
BUN SERPL-MCNC: 29.8 MG/DL (ref 8–23)
CALCIUM SERPL-MCNC: 9.9 MG/DL (ref 8.8–10.4)
CHLORIDE SERPL-SCNC: 100 MMOL/L (ref 98–107)
CREAT SERPL-MCNC: 1.11 MG/DL (ref 0.51–0.95)
EGFRCR SERPLBLD CKD-EPI 2021: 51 ML/MIN/1.73M2
GLUCOSE SERPL-MCNC: 84 MG/DL (ref 70–99)
HCO3 SERPL-SCNC: 25 MMOL/L (ref 22–29)
POTASSIUM SERPL-SCNC: 4.3 MMOL/L (ref 3.4–5.3)
PROT SERPL-MCNC: 8.3 G/DL (ref 6.4–8.3)
SODIUM SERPL-SCNC: 141 MMOL/L (ref 135–145)

## 2025-11-30 ENCOUNTER — MEDICAL CORRESPONDENCE (OUTPATIENT)
Dept: HEALTH INFORMATION MANAGEMENT | Facility: CLINIC | Age: 80
End: 2025-11-30
Payer: COMMERCIAL

## (undated) DEVICE — CONNECTOR WOUND VAC Y M6275066/5

## (undated) DEVICE — CLEANSER WOUND DEBRIDEMENT VASHE 250ML 00313

## (undated) DEVICE — DRAPE LAP W/ARMBOARD 29410

## (undated) DEVICE — Device

## (undated) DEVICE — DRSG ACTICOAT 7 4X5" 20141

## (undated) DEVICE — CANISTER WOUND VAC W/GEL 500ML M8275063/5

## (undated) DEVICE — DRSG AQUACEL EXTRA AG 4X5" 422299

## (undated) DEVICE — BLADE KNIFE SURG 10 371110

## (undated) DEVICE — PACK MINOR SBA15MIFSE

## (undated) DEVICE — BLADE DERMATOME ZIMMER  00-8800-000-10

## (undated) DEVICE — DRAPE IOBAN INCISE 23X17" 6650EZ

## (undated) DEVICE — SUCTION MANIFOLD NEPTUNE 2 SYS 1 PORT 702-025-000

## (undated) DEVICE — DRSG TEGADERM 4X4 3/4" 1626

## (undated) DEVICE — DRSG ABSB SUREPREP WIPE SKIN PROTECTION MSC1500

## (undated) DEVICE — CASSETTE NEG PRSS F/WND DRN V.A.C.VERALINK LF ULTLNK0500

## (undated) DEVICE — DRAPE POUCH IRR 1016

## (undated) DEVICE — GLOVE PROTEXIS W/NEU-THERA 7.5  2D73TE75

## (undated) DEVICE — NEXUS SONICONE CURETTE TIP EXCEL KIT & TUBESET120-31-13C3

## (undated) DEVICE — TUBING SUCTION MEDI-VAC 1/4"X20' N620A

## (undated) DEVICE — SYR 10ML LL W/O NDL 302995

## (undated) DEVICE — MANIFOLD NEPTUNE 4 PORT 700-20

## (undated) DEVICE — LINEN TOWEL PACK X5 5464

## (undated) DEVICE — DRSG VAC VERAFLO CLEANSE LG 5-PK ULTVCC05LG

## (undated) DEVICE — CAST PADDING 4" STERILE 9044S

## (undated) DEVICE — SUCTION TIP YANKAUER W/O VENT K86

## (undated) DEVICE — SOL NACL 0.9% IRRIG 1000ML BOTTLE 2F7124

## (undated) DEVICE — PREP SKIN SCRUB TRAY 4461A

## (undated) DEVICE — STPL SKIN 35W ROTATING HEAD PRW35

## (undated) DEVICE — CANISTER WOUND VAC W/GEL 1000ML M8275093/5

## (undated) DEVICE — SOL WATER IRRIG 1000ML BOTTLE 2F7114

## (undated) DEVICE — DRAPE EXTREMITY W/ARMBOARD 29405

## (undated) DEVICE — SOL WOUND IRRIG PRONTOSAN 350 ML BOTTLE 400441

## (undated) DEVICE — PACK UNIVERSAL SPLIT 29131

## (undated) DEVICE — NDL SPINAL 18GA 3.5" 405184

## (undated) DEVICE — DERMACARRIER 1.5:1 ZIMMER 00-2195-012-00

## (undated) DEVICE — BLADE KNIFE SURG 15 371115

## (undated) DEVICE — DRSG HYDROFERA FOAM PVA ABSB BACTERIOSTATIC 6X6" HB6614

## (undated) DEVICE — ESU ELEC BLADE NN-STCK PLUMEPEN PRO 360D 10FT PLPRO4020

## (undated) RX ORDER — ONDANSETRON 2 MG/ML
INJECTION INTRAMUSCULAR; INTRAVENOUS
Status: DISPENSED
Start: 2022-03-28

## (undated) RX ORDER — FENTANYL CITRATE 50 UG/ML
INJECTION, SOLUTION INTRAMUSCULAR; INTRAVENOUS
Status: DISPENSED
Start: 2022-03-21

## (undated) RX ORDER — ONDANSETRON 2 MG/ML
INJECTION INTRAMUSCULAR; INTRAVENOUS
Status: DISPENSED
Start: 2022-03-21

## (undated) RX ORDER — HYDROMORPHONE HCL IN WATER/PF 6 MG/30 ML
PATIENT CONTROLLED ANALGESIA SYRINGE INTRAVENOUS
Status: DISPENSED
Start: 2022-03-21

## (undated) RX ORDER — LIDOCAINE HYDROCHLORIDE 20 MG/ML
INJECTION, SOLUTION EPIDURAL; INFILTRATION; INTRACAUDAL; PERINEURAL
Status: DISPENSED
Start: 2022-03-21

## (undated) RX ORDER — HYDROXYZINE HYDROCHLORIDE 50 MG/ML
INJECTION, SOLUTION INTRAMUSCULAR
Status: DISPENSED
Start: 2022-03-21

## (undated) RX ORDER — FENTANYL CITRATE 0.05 MG/ML
INJECTION, SOLUTION INTRAMUSCULAR; INTRAVENOUS
Status: DISPENSED
Start: 2022-03-21

## (undated) RX ORDER — LIDOCAINE HYDROCHLORIDE 10 MG/ML
INJECTION, SOLUTION EPIDURAL; INFILTRATION; INTRACAUDAL; PERINEURAL
Status: DISPENSED
Start: 2022-03-28

## (undated) RX ORDER — CEFAZOLIN SODIUM IN 0.9 % NACL 3 G/100 ML
INTRAVENOUS SOLUTION, PIGGYBACK (ML) INTRAVENOUS
Status: DISPENSED
Start: 2022-03-21

## (undated) RX ORDER — REGADENOSON 0.08 MG/ML
INJECTION, SOLUTION INTRAVENOUS
Status: DISPENSED
Start: 2022-04-05

## (undated) RX ORDER — DEXAMETHASONE SODIUM PHOSPHATE 4 MG/ML
INJECTION, SOLUTION INTRA-ARTICULAR; INTRALESIONAL; INTRAMUSCULAR; INTRAVENOUS; SOFT TISSUE
Status: DISPENSED
Start: 2022-03-21

## (undated) RX ORDER — HYDROMORPHONE HYDROCHLORIDE 1 MG/ML
INJECTION, SOLUTION INTRAMUSCULAR; INTRAVENOUS; SUBCUTANEOUS
Status: DISPENSED
Start: 2022-03-21

## (undated) RX ORDER — LIDOCAINE HYDROCHLORIDE 20 MG/ML
INJECTION, SOLUTION EPIDURAL; INFILTRATION; INTRACAUDAL; PERINEURAL
Status: DISPENSED
Start: 2022-03-28

## (undated) RX ORDER — LIDOCAINE HYDROCHLORIDE 10 MG/ML
INJECTION, SOLUTION INFILTRATION; PERINEURAL
Status: DISPENSED
Start: 2022-03-28

## (undated) RX ORDER — DEXAMETHASONE SODIUM PHOSPHATE 4 MG/ML
INJECTION, SOLUTION INTRA-ARTICULAR; INTRALESIONAL; INTRAMUSCULAR; INTRAVENOUS; SOFT TISSUE
Status: DISPENSED
Start: 2022-03-28

## (undated) RX ORDER — HYDROMORPHONE HCL IN WATER/PF 6 MG/30 ML
PATIENT CONTROLLED ANALGESIA SYRINGE INTRAVENOUS
Status: DISPENSED
Start: 2022-03-28

## (undated) RX ORDER — FENTANYL CITRATE 50 UG/ML
INJECTION, SOLUTION INTRAMUSCULAR; INTRAVENOUS
Status: DISPENSED
Start: 2022-03-28